# Patient Record
Sex: FEMALE | Race: BLACK OR AFRICAN AMERICAN | NOT HISPANIC OR LATINO | Employment: OTHER | URBAN - METROPOLITAN AREA
[De-identification: names, ages, dates, MRNs, and addresses within clinical notes are randomized per-mention and may not be internally consistent; named-entity substitution may affect disease eponyms.]

---

## 2017-01-02 DIAGNOSIS — H60.90 OTITIS EXTERNA, UNSPECIFIED CHRONICITY, UNSPECIFIED LATERALITY, UNSPECIFIED TYPE: ICD-10-CM

## 2017-01-02 RX ORDER — CIPROFLOXACIN HYDROCHLORIDE 3 MG/ML
SOLUTION/ DROPS OPHTHALMIC
Qty: 5 ML | Refills: 0 | Status: SHIPPED | OUTPATIENT
Start: 2017-01-02 | End: 2017-03-31 | Stop reason: ALTCHOICE

## 2017-01-07 ENCOUNTER — PATIENT MESSAGE (OUTPATIENT)
Dept: FAMILY MEDICINE | Facility: CLINIC | Age: 72
End: 2017-01-07

## 2017-01-07 DIAGNOSIS — J30.9 ALLERGIC RHINITIS WITH POSTNASAL DRIP: Primary | ICD-10-CM

## 2017-01-07 DIAGNOSIS — R09.82 ALLERGIC RHINITIS WITH POSTNASAL DRIP: Primary | ICD-10-CM

## 2017-01-09 ENCOUNTER — PATIENT MESSAGE (OUTPATIENT)
Dept: CARDIOLOGY | Facility: CLINIC | Age: 72
End: 2017-01-09

## 2017-01-09 ENCOUNTER — TELEPHONE (OUTPATIENT)
Dept: GASTROENTEROLOGY | Facility: CLINIC | Age: 72
End: 2017-01-09

## 2017-01-09 DIAGNOSIS — E78.5 HYPERLIPIDEMIA, UNSPECIFIED HYPERLIPIDEMIA TYPE: ICD-10-CM

## 2017-01-09 RX ORDER — IPRATROPIUM BROMIDE 21 UG/1
2 SPRAY, METERED NASAL 2 TIMES DAILY PRN
Qty: 30 ML | Refills: 0 | Status: ON HOLD | OUTPATIENT
Start: 2017-01-09 | End: 2018-03-28 | Stop reason: HOSPADM

## 2017-01-09 RX ORDER — ROSUVASTATIN CALCIUM 40 MG/1
40 TABLET, COATED ORAL NIGHTLY
Qty: 90 TABLET | Refills: 3 | Status: SHIPPED | OUTPATIENT
Start: 2017-01-09 | End: 2017-01-12

## 2017-01-09 NOTE — TELEPHONE ENCOUNTER
Reports that she is doing very well.  No weight loss.  Metamucil added and diet changes and is having no further complaints.    She would like to cancel upcoming fu appt.    She will call back if she begins to have complaints or would like to follow up.    Please cancel her appt with me

## 2017-01-09 NOTE — TELEPHONE ENCOUNTER
----- Message from Vivi Box MA sent at 1/9/2017  2:57 PM CST -----  Contact: self, [phone]  Pt wants to speak to you.  ----- Message -----     From: Nieves Duncan     Sent: 1/9/2017   2:07 PM       To: Noel TERRAZAS Staff    Patient called in requesting to speak with you regarding her 1/18 appt.  Please advise.

## 2017-01-12 ENCOUNTER — TELEPHONE (OUTPATIENT)
Dept: CARDIOLOGY | Facility: CLINIC | Age: 72
End: 2017-01-12

## 2017-01-12 RX ORDER — ATORVASTATIN CALCIUM 40 MG/1
40 TABLET, FILM COATED ORAL DAILY
Qty: 90 TABLET | Refills: 3 | Status: SHIPPED | OUTPATIENT
Start: 2017-01-12 | End: 2017-02-21 | Stop reason: ALTCHOICE

## 2017-01-12 RX ORDER — ATORVASTATIN CALCIUM 40 MG/1
40 TABLET, FILM COATED ORAL DAILY
Qty: 90 TABLET | Refills: 3 | Status: SHIPPED | OUTPATIENT
Start: 2017-01-12 | End: 2017-01-12 | Stop reason: SDUPTHER

## 2017-01-12 NOTE — TELEPHONE ENCOUNTER
----- Message from Kirsten Jovel MA sent at 1/12/2017 11:15 AM CST -----  Contact: patient called   Joseph is not going to pay for crestor , can you put her a generic med.  ----- Message -----     From: Kelli Carpenter MA     Sent: 1/12/2017   9:17 AM       To: Garo Lizarraga Staff    Please call the patient at home she need to talk to you about her labs.

## 2017-01-12 NOTE — TELEPHONE ENCOUNTER
Spoke with Dr. Garo Curtis's MA - says that med was handled already - Left a VM for the pt to disregard my previous message.

## 2017-01-12 NOTE — TELEPHONE ENCOUNTER
----- Message from Kelli Carpenter MA sent at 1/12/2017  9:21 AM CST -----  Contact: patient called  Please call the patient at home she need to talk to you about her Crestor and her E-Mail. Last visit was on 10- . Thank you.

## 2017-01-13 ENCOUNTER — PATIENT MESSAGE (OUTPATIENT)
Dept: FAMILY MEDICINE | Facility: CLINIC | Age: 72
End: 2017-01-13

## 2017-01-13 ENCOUNTER — PATIENT MESSAGE (OUTPATIENT)
Dept: CARDIOLOGY | Facility: CLINIC | Age: 72
End: 2017-01-13

## 2017-01-13 DIAGNOSIS — N95.2 ATROPHIC VAGINITIS: Primary | ICD-10-CM

## 2017-01-14 ENCOUNTER — PATIENT MESSAGE (OUTPATIENT)
Dept: FAMILY MEDICINE | Facility: CLINIC | Age: 72
End: 2017-01-14

## 2017-01-16 ENCOUNTER — PATIENT MESSAGE (OUTPATIENT)
Dept: FAMILY MEDICINE | Facility: CLINIC | Age: 72
End: 2017-01-16

## 2017-01-20 ENCOUNTER — TELEPHONE (OUTPATIENT)
Dept: OPHTHALMOLOGY | Facility: CLINIC | Age: 72
End: 2017-01-20

## 2017-01-20 NOTE — TELEPHONE ENCOUNTER
Vague statement of seeing a aberration in the morning out of both eyes for a for a month or more out of her glass door. Apt booked.

## 2017-01-20 NOTE — TELEPHONE ENCOUNTER
----- Message from So Posadas sent at 1/20/2017 11:53 AM CST -----  Contact: Ewelina Arnold   Pt would like to speak with  nurse has some questions a problem she have with both eyes pt can be reached at [phone] please thanks.

## 2017-01-23 ENCOUNTER — PATIENT MESSAGE (OUTPATIENT)
Dept: FAMILY MEDICINE | Facility: CLINIC | Age: 72
End: 2017-01-23

## 2017-01-23 ENCOUNTER — OFFICE VISIT (OUTPATIENT)
Dept: FAMILY MEDICINE | Facility: CLINIC | Age: 72
End: 2017-01-23
Payer: MEDICARE

## 2017-01-23 VITALS
HEART RATE: 56 BPM | WEIGHT: 131.81 LBS | HEIGHT: 65 IN | TEMPERATURE: 99 F | DIASTOLIC BLOOD PRESSURE: 60 MMHG | BODY MASS INDEX: 21.96 KG/M2 | SYSTOLIC BLOOD PRESSURE: 132 MMHG

## 2017-01-23 DIAGNOSIS — N18.4 CKD (CHRONIC KIDNEY DISEASE), STAGE IV: ICD-10-CM

## 2017-01-23 DIAGNOSIS — E46 PROTEIN-CALORIE MALNUTRITION: ICD-10-CM

## 2017-01-23 DIAGNOSIS — R79.9 ABNORMAL FINDING OF BLOOD CHEMISTRY: ICD-10-CM

## 2017-01-23 DIAGNOSIS — I70.0 AORTIC ATHEROSCLEROSIS: ICD-10-CM

## 2017-01-23 DIAGNOSIS — N18.30 CKD (CHRONIC KIDNEY DISEASE) STAGE 3, GFR 30-59 ML/MIN: Chronic | ICD-10-CM

## 2017-01-23 DIAGNOSIS — R63.0 LOSS OF APPETITE: ICD-10-CM

## 2017-01-23 DIAGNOSIS — N89.8 DISCHARGE FROM THE VAGINA: ICD-10-CM

## 2017-01-23 DIAGNOSIS — R63.4 WEIGHT LOSS: Primary | ICD-10-CM

## 2017-01-23 LAB
CANDIDA RRNA VAG QL PROBE: NEGATIVE
G VAGINALIS RRNA GENITAL QL PROBE: NEGATIVE
T VAGINALIS RRNA GENITAL QL PROBE: NEGATIVE

## 2017-01-23 PROCEDURE — 1159F MED LIST DOCD IN RCRD: CPT | Mod: S$GLB,,, | Performed by: FAMILY MEDICINE

## 2017-01-23 PROCEDURE — 1160F RVW MEDS BY RX/DR IN RCRD: CPT | Mod: S$GLB,,, | Performed by: FAMILY MEDICINE

## 2017-01-23 PROCEDURE — 1125F AMNT PAIN NOTED PAIN PRSNT: CPT | Mod: S$GLB,,, | Performed by: FAMILY MEDICINE

## 2017-01-23 PROCEDURE — 87480 CANDIDA DNA DIR PROBE: CPT

## 2017-01-23 PROCEDURE — 99499 UNLISTED E&M SERVICE: CPT | Mod: S$GLB,,, | Performed by: FAMILY MEDICINE

## 2017-01-23 PROCEDURE — 99999 PR PBB SHADOW E&M-EST. PATIENT-LVL III: CPT | Mod: PBBFAC,,, | Performed by: FAMILY MEDICINE

## 2017-01-23 PROCEDURE — 3075F SYST BP GE 130 - 139MM HG: CPT | Mod: S$GLB,,, | Performed by: FAMILY MEDICINE

## 2017-01-23 PROCEDURE — 3078F DIAST BP <80 MM HG: CPT | Mod: S$GLB,,, | Performed by: FAMILY MEDICINE

## 2017-01-23 PROCEDURE — 87210 SMEAR WET MOUNT SALINE/INK: CPT | Mod: QW,S$GLB,, | Performed by: FAMILY MEDICINE

## 2017-01-23 PROCEDURE — 1157F ADVNC CARE PLAN IN RCRD: CPT | Mod: S$GLB,,, | Performed by: FAMILY MEDICINE

## 2017-01-23 PROCEDURE — 99214 OFFICE O/P EST MOD 30 MIN: CPT | Mod: S$GLB,,, | Performed by: FAMILY MEDICINE

## 2017-01-23 RX ORDER — FLUCONAZOLE 150 MG/1
150 TABLET ORAL DAILY
Qty: 1 TABLET | Refills: 0 | Status: SHIPPED | OUTPATIENT
Start: 2017-01-23 | End: 2017-01-24

## 2017-01-23 NOTE — MR AVS SNAPSHOT
[facility]  [address] [facility], [address]  [facility] [address]  Phone: [phone]  Fax: [phone]                  Ewelina Arnold   2017 9:40 AM   Office Visit    Description:  Female : 1945   Provider:  Kalie Walton MD   Department:  [facility]           Reason for Visit     Otalgia     diabetic testing     vaginal dryness     Weight Loss           Diagnoses this Visit        Comments    Weight loss    -  Primary     Loss of appetite         Protein-calorie malnutrition         Discharge from the vagina         Abnormal finding of blood chemistry                To Do List           Future Appointments        Provider Department Dept Phone    2017 9:30 AM [facility] [phone]    2017 9:45 AM SPECIMEN, [place] [facility] [phone]    2017 1:00 PM PERIMETRY, [place] [facility] [phone]    2017 1:30 PM Marky Mccray MD [facility] [phone]    2017 9:30 AM [facility] [phone]      Goals (5 Years of Data)     None       These Medications        Disp Refills Start End    fluconazole (DIFLUCAN) 150 MG Tab 1 tablet 0 2017    Take 1 tablet (150 mg total) by mouth once daily. - Oral    Pharmacy: [facility] [address] [place] [address] GAURANG TORRES AT [facility] Ph #: [phone]         [facility]     [facility] Nurse Care [facility] -  Assistance  Registered nurses in the [facility] provide clinical advisement, health education, appointment booking, and other advisory services.  Call for this free service at [phone].             Medications           Message regarding Medications     Verify the changes and/or additions to your medication regime listed below are the same as discussed with your clinician today.   If any of these changes or additions are incorrect, please notify your healthcare provider.        START taking these NEW medications        Refills    fluconazole (DIFLUCAN) 150 MG Tab 0    Sig: Take 1 tablet (150 mg total) by mouth once daily.    Class: Normal    Route: Oral      STOP taking these medications     estrogens, conjugated, (PREMARIN) 0.3 MG tablet Take 1 tablet (0.3 mg total) by mouth once daily.           Verify that the below list of medications is an accurate representation of the medications you are currently taking.  If none reported, the list may be blank. If incorrect, please contact your healthcare provider. Carry this list with you in case of emergency.           Current Medications     amlodipine (NORVASC) 10 MG tablet TAKE 1 TABLET EVERY DAY    ammonium lactate 12 % Crea Apply 1 application topically 2 (two) times daily as needed.    aspirin (ECOTRIN) 81 MG EC tablet Take 1 tablet (81 mg total) by mouth once daily.    atorvastatin (LIPITOR) 40 MG tablet Take 1 tablet (40 mg total) by mouth once daily.    butalbital-aspirin-caffeine -40 mg (FIORINAL) -40 mg Cap Take 1 capsule by mouth.    carvedilol (COREG) 12.5 MG tablet TAKE 1 TABLET TWICE DAILY WITH MEALS    cholestyramine-aspartame (CHOLESTYRAMINE LIGHT) 4 gram PwPk Take by mouth.    ciprofloxacin HCl (CILOXAN) 0.3 % ophthalmic solution INSTILL 2 DROPS IN EACH EAR THREE TIMES DAILY.    citalopram (CELEXA) 10 MG tablet TAKE 1 TABLET EVERY DAY    ferrous gluconate (FERGON) 325 MG Tab Take 1 tablet (325 mg total) by mouth daily with breakfast.    furosemide (LASIX) 20 MG tablet TAKE 1 TABLET TWICE DAILY    gentamicin-prednisoLONE (PRED G) 0.3-1 % ophthalmic drops 2 drops to the affected ear 3 times a day for 5 days.  Please let patient know that ophthalmic drops can be used in the ear.    hydrALAZINE (APRESOLINE) 10 MG tablet TAKE 2 TABLETS EVERY 12 HOURS    hydroxychloroquine (PLAQUENIL) 200 mg tablet TAKE 1 TABLET TWICE DAILY  "   hyoscyamine (LEVSIN/SL) 0.125 mg Subl Place 1 tablet (0.125 mg total) under the tongue 2 (two) times daily as needed.    ipratropium (ATROVENT) 0.03 % nasal spray 2 sprays by Nasal route 2 (two) times daily as needed for Rhinitis.    latanoprost 0.005 % ophthalmic solution INSTILL 1 DROP INTO BOTH EYES EVERY DAY    levothyroxine (SYNTHROID) 50 MCG tablet TAKE 1 TABLET BEFORE BREAKFAST DAILY    meclizine (ANTIVERT) 12.5 mg tablet Take 1 tablet (12.5 mg total) by mouth 3 (three) times daily as needed.    multivitamin capsule Take 1 capsule by mouth once daily.    nitroGLYCERIN (NITROSTAT) 0.4 MG SL tablet PLACE 1 TABLET UNDER THE TONGUE EVERY 5 MINUTES FOR 3 DOSES --IF NOT RELIEVED GO TO THE ER    nystatin-triamcinolone (MYCOLOG II) cream Apply topically 4 (four) times daily.    TENS units Sravanthi 1 application by Misc.(Non-Drug; Combo Route) route daily as needed (pain).    triamcinolone acetonide 0.1% (KENALOG) 0.1 % paste PLACE ONTO TEETH TWICE DAILY    betamethasone dipropionate (DIPROLENE) 0.05 % cream Apply topically 2 (two) times daily as needed.    fexofenadine (ALLEGRA) 180 MG tablet Take 1 tablet (180 mg total) by mouth once daily.    fluconazole (DIFLUCAN) 150 MG Tab Take 1 tablet (150 mg total) by mouth once daily.    mometasone (ELOCON) 0.1 % ointment Apply topically once daily.    omeprazole (PRILOSEC) 20 MG capsule Take 20 mg by mouth as needed.            Clinical Reference Information           Vital Signs - Last Recorded  Most recent update: 1/23/2017  9:38 AM by Reyna Forman MA    BP Pulse Temp Ht Wt BMI    132/60 (BP Location: Left arm) (!) 56 98.5 °F (36.9 °C) 5' 5" (1.651 m) 59.8 kg (131 lb 13.4 oz) 21.94 kg/m2      Blood Pressure          Most Recent Value    BP  132/60      Allergies as of 1/23/2017     Ace Inhibitors    Arthrotec 50  [Diclofenac-misoprostol]    Bactrim [Sulfamethoxazole-trimethoprim]    Bentyl [Dicyclomine]    Doxycycline    Imdur [Isosorbide Mononitrate]    Lomotil " [Diphenoxylate-atropine]    Tramadol    Vioxx [Rofecoxib]    Lozol [Indapamide]      Immunizations Administered on Date of Encounter - 1/23/2017     None      Orders Placed During Today's Visit      Normal Orders This Visit    VAGINOSIS SCREEN BY DNA PROBE     Future Labs/Procedures Expected by Expires    CBC auto differential  1/23/2017 3/24/2018    Comprehensive metabolic panel  1/23/2017 3/24/2018    Hemoglobin A1c  1/23/2017 3/24/2018    Lipid panel  1/23/2017 3/24/2018    TSH  1/23/2017 3/24/2018

## 2017-01-24 ENCOUNTER — LAB VISIT (OUTPATIENT)
Dept: LAB | Facility: HOSPITAL | Age: 72
End: 2017-01-24
Attending: FAMILY MEDICINE
Payer: MEDICARE

## 2017-01-24 ENCOUNTER — PATIENT MESSAGE (OUTPATIENT)
Dept: FAMILY MEDICINE | Facility: CLINIC | Age: 72
End: 2017-01-24

## 2017-01-24 DIAGNOSIS — R79.9 ABNORMAL FINDING OF BLOOD CHEMISTRY: ICD-10-CM

## 2017-01-24 DIAGNOSIS — R63.4 WEIGHT LOSS: ICD-10-CM

## 2017-01-24 DIAGNOSIS — R63.0 LOSS OF APPETITE: ICD-10-CM

## 2017-01-24 DIAGNOSIS — E46 PROTEIN-CALORIE MALNUTRITION: ICD-10-CM

## 2017-01-24 LAB
ALBUMIN SERPL BCP-MCNC: 3.4 G/DL
ALP SERPL-CCNC: 60 U/L
ALT SERPL W/O P-5'-P-CCNC: 20 U/L
ANION GAP SERPL CALC-SCNC: 6 MMOL/L
AST SERPL-CCNC: 36 U/L
BASOPHILS # BLD AUTO: 0.02 K/UL
BASOPHILS NFR BLD: 0.6 %
BILIRUB SERPL-MCNC: 0.3 MG/DL
BUN SERPL-MCNC: 31 MG/DL
CALCIUM SERPL-MCNC: 9.2 MG/DL
CHLORIDE SERPL-SCNC: 111 MMOL/L
CHOLEST/HDLC SERPL: 2.5 {RATIO}
CO2 SERPL-SCNC: 23 MMOL/L
CREAT SERPL-MCNC: 1.7 MG/DL
DIFFERENTIAL METHOD: ABNORMAL
EOSINOPHIL # BLD AUTO: 0.1 K/UL
EOSINOPHIL NFR BLD: 4.2 %
ERYTHROCYTE [DISTWIDTH] IN BLOOD BY AUTOMATED COUNT: 13.4 %
EST. GFR  (AFRICAN AMERICAN): 34.5 ML/MIN/1.73 M^2
EST. GFR  (NON AFRICAN AMERICAN): 29.9 ML/MIN/1.73 M^2
GLUCOSE SERPL-MCNC: 67 MG/DL
HCT VFR BLD AUTO: 34.1 %
HDL/CHOLESTEROL RATIO: 39.4 %
HDLC SERPL-MCNC: 137 MG/DL
HDLC SERPL-MCNC: 54 MG/DL
HGB BLD-MCNC: 10.9 G/DL
LDLC SERPL CALC-MCNC: 71.6 MG/DL
LYMPHOCYTES # BLD AUTO: 1.4 K/UL
LYMPHOCYTES NFR BLD: 41.8 %
MCH RBC QN AUTO: 26.3 PG
MCHC RBC AUTO-ENTMCNC: 32 %
MCV RBC AUTO: 82 FL
MONOCYTES # BLD AUTO: 0.5 K/UL
MONOCYTES NFR BLD: 14.9 %
NEUTROPHILS # BLD AUTO: 1.3 K/UL
NEUTROPHILS NFR BLD: 38.5 %
NONHDLC SERPL-MCNC: 83 MG/DL
PLATELET # BLD AUTO: 192 K/UL
PMV BLD AUTO: 10.6 FL
POTASSIUM SERPL-SCNC: 4.6 MMOL/L
PROT SERPL-MCNC: 7.4 G/DL
RBC # BLD AUTO: 4.15 M/UL
SODIUM SERPL-SCNC: 140 MMOL/L
TRIGL SERPL-MCNC: 57 MG/DL
TSH SERPL DL<=0.005 MIU/L-ACNC: 1.19 UIU/ML
WBC # BLD AUTO: 3.35 K/UL

## 2017-01-24 PROCEDURE — 85025 COMPLETE CBC W/AUTO DIFF WBC: CPT

## 2017-01-24 PROCEDURE — 84443 ASSAY THYROID STIM HORMONE: CPT

## 2017-01-24 PROCEDURE — 80053 COMPREHEN METABOLIC PANEL: CPT

## 2017-01-24 PROCEDURE — 83036 HEMOGLOBIN GLYCOSYLATED A1C: CPT

## 2017-01-24 PROCEDURE — 80061 LIPID PANEL: CPT

## 2017-01-24 PROCEDURE — 36415 COLL VENOUS BLD VENIPUNCTURE: CPT | Mod: PO

## 2017-01-24 NOTE — PROGRESS NOTES
Subjective:       Patient ID: Ewelina Arnold is a 71 y.o. female.    Chief Complaint: Otalgia (bilateral); diabetic testing; vaginal dryness; and Weight Loss (unexplained)   patient complaining of a mild vaginal discharge, bilateral ear pain, and weight loss.  Patient's recent colonoscopy was well within normal limits. Pt wants to be tested for diabetes  HPI see above  Review of Systems   Constitutional: Negative.    HENT: Positive for ear pain, postnasal drip, rhinorrhea and sinus pressure.    Eyes: Negative.    Respiratory: Negative.    Cardiovascular: Negative.    Gastrointestinal: Negative.    Endocrine: Negative.    Genitourinary: Positive for vaginal discharge.   Musculoskeletal: Negative.    Skin: Negative.    Allergic/Immunologic: Negative.    Neurological: Negative.    Hematological: Negative.    Psychiatric/Behavioral: Negative.        Objective:      Physical Exam   Constitutional: She is oriented to person, place, and time. She appears well-developed and well-nourished. No distress.   HENT:   Head: Normocephalic and atraumatic.   Right Ear: Hearing and external ear normal. A foreign body is present.   Left Ear: Hearing and external ear normal. A foreign body is present.   Nose: Nose normal. Right sinus exhibits no maxillary sinus tenderness and no frontal sinus tenderness. Left sinus exhibits no maxillary sinus tenderness and no frontal sinus tenderness.   Mouth/Throat: Uvula is midline and oropharynx is clear and moist.   Eyes: Conjunctivae and EOM are normal. Pupils are equal, round, and reactive to light. Right eye exhibits no discharge. Left eye exhibits no discharge.   Cardiovascular: Normal rate, regular rhythm, normal heart sounds and intact distal pulses.    Pulmonary/Chest: Effort normal and breath sounds normal. No respiratory distress. She has no wheezes. She has no rales. She exhibits no tenderness.   Abdominal: Soft. Bowel sounds are normal. She exhibits no distension. There is no tenderness.  Hernia confirmed negative in the right inguinal area and confirmed negative in the left inguinal area.   Genitourinary: Vagina normal. Pelvic exam was performed with patient supine. No labial fusion. There is no rash, tenderness, lesion or injury on the right labia. There is no rash, tenderness, lesion or injury on the left labia. Cervix exhibits discharge. Cervix exhibits no motion tenderness and no friability. No erythema, tenderness or bleeding in the vagina. No foreign body in the vagina. No signs of injury around the vagina.   Lymphadenopathy:        Right: No inguinal adenopathy present.        Left: No inguinal adenopathy present.   Neurological: She is alert and oriented to person, place, and time.   Skin: Skin is warm and dry. No rash noted. She is not diaphoretic. No erythema. No pallor.   Psychiatric: She has a normal mood and affect. Her behavior is normal. Judgment and thought content normal.   Nursing note and vitals reviewed.      Assessment:       1. Weight loss    2. Loss of appetite    3. Protein-calorie malnutrition     4. Discharge from the vagina    5. Abnormal finding of blood chemistry       6.    Bilateral cerumen impactions  Plan:     med card 1-23-17    vaginosis screen performed and pending  We'll obtain hemoglobin A1c, CBC, TSH, comprehensive metabolic profile, lipid profile.  We'll contact patient with results when available.  Recommend patient increase number of calories consumed.    Bilateral ear wash was performed with good results leaving both ear canals clear, both TMs clear.  Patient tolerated the procedure with no difficulty.

## 2017-01-25 LAB
ESTIMATED AVG GLUCOSE: 117 MG/DL
HBA1C MFR BLD HPLC: 5.7 %

## 2017-01-27 ENCOUNTER — TELEPHONE (OUTPATIENT)
Dept: FAMILY MEDICINE | Facility: CLINIC | Age: 72
End: 2017-01-27

## 2017-01-27 ENCOUNTER — PATIENT MESSAGE (OUTPATIENT)
Dept: FAMILY MEDICINE | Facility: CLINIC | Age: 72
End: 2017-01-27

## 2017-01-27 NOTE — TELEPHONE ENCOUNTER
----- Message from Kiah Crowe sent at 1/27/2017 12:46 PM CST -----  Contact: [phone]  Patient would like to get test results.  Name of test (lab, mammo, etc.):  Labs and vaginal exam ressults  Date of test:  1/24  Ordering provider: Dr. Ruth  Where was the test performed:  [facility]  Comments:

## 2017-01-27 NOTE — TELEPHONE ENCOUNTER
Spoke with patient advised her of released results per Dr. Ruth for labs completed on 1/24/17 and vaginosis completed on 1/23/17.  Patient also advised that she can view results online per [facility].  Patient verbalizes understanding.

## 2017-01-30 ENCOUNTER — PATIENT MESSAGE (OUTPATIENT)
Dept: FAMILY MEDICINE | Facility: CLINIC | Age: 72
End: 2017-01-30

## 2017-01-31 ENCOUNTER — TELEPHONE (OUTPATIENT)
Dept: FAMILY MEDICINE | Facility: CLINIC | Age: 72
End: 2017-01-31

## 2017-01-31 ENCOUNTER — PATIENT MESSAGE (OUTPATIENT)
Dept: FAMILY MEDICINE | Facility: CLINIC | Age: 72
End: 2017-01-31

## 2017-01-31 ENCOUNTER — TELEPHONE (OUTPATIENT)
Dept: PHARMACY | Facility: CLINIC | Age: 72
End: 2017-01-31

## 2017-01-31 DIAGNOSIS — E55.9 VITAMIN D DEFICIENCY DISEASE: Primary | ICD-10-CM

## 2017-01-31 RX ORDER — ERGOCALCIFEROL 1.25 MG/1
50000 CAPSULE ORAL
Qty: 3 CAPSULE | Refills: 0 | OUTPATIENT
Start: 2017-01-31

## 2017-01-31 NOTE — TELEPHONE ENCOUNTER
----- Message from Tran Wilkins sent at 1/31/2017  8:15 AM CST -----  Contact: call  [phone]  Patient is calling in regards to getting a  vitimain d level done, pt states that she has a lab appt. Scheduled for tomorrow 02/01/17 in [facility] and would like to have added to that lab order so she does not have to be stuck twice. Patient states that she has been in contact with dr castle over the computer

## 2017-01-31 NOTE — TELEPHONE ENCOUNTER
Calcitriol order linked to lab appt 2/1/17.  Patient advised per [facility] refer to email encounter dated 1/30/17.

## 2017-02-01 ENCOUNTER — CLINICAL SUPPORT (OUTPATIENT)
Dept: OPHTHALMOLOGY | Facility: CLINIC | Age: 72
End: 2017-02-01
Payer: MEDICARE

## 2017-02-01 ENCOUNTER — OFFICE VISIT (OUTPATIENT)
Dept: OPHTHALMOLOGY | Facility: CLINIC | Age: 72
End: 2017-02-01
Payer: MEDICARE

## 2017-02-01 ENCOUNTER — LAB VISIT (OUTPATIENT)
Dept: LAB | Facility: HOSPITAL | Age: 72
End: 2017-02-01
Attending: INTERNAL MEDICINE
Payer: MEDICARE

## 2017-02-01 DIAGNOSIS — H40.243 RESIDUAL STAGE OF ANGLE-CLOSURE GLAUCOMA OF BOTH EYES: Primary | ICD-10-CM

## 2017-02-01 DIAGNOSIS — Z79.899 HIGH RISK MEDICATION USE: ICD-10-CM

## 2017-02-01 DIAGNOSIS — R74.01 ELEVATED AST (SGOT): ICD-10-CM

## 2017-02-01 DIAGNOSIS — M32.9 SYSTEMIC LUPUS ERYTHEMATOSUS, UNSPECIFIED SLE TYPE, UNSPECIFIED ORGAN INVOLVEMENT STATUS: ICD-10-CM

## 2017-02-01 DIAGNOSIS — M32.9 LUPUS (SYSTEMIC LUPUS ERYTHEMATOSUS): Chronic | ICD-10-CM

## 2017-02-01 DIAGNOSIS — H25.13 NUCLEAR SCLEROSIS, BILATERAL: ICD-10-CM

## 2017-02-01 DIAGNOSIS — E55.9 VITAMIN D DEFICIENCY DISEASE: ICD-10-CM

## 2017-02-01 DIAGNOSIS — H04.123 INSUFFICIENCY OF TEAR FILM OF BOTH EYES: ICD-10-CM

## 2017-02-01 DIAGNOSIS — I13.0 BENIGN HYPERTENSIVE HEART AND CKD, STAGE 3 (GFR 30-59), W CHF: ICD-10-CM

## 2017-02-01 DIAGNOSIS — N18.30 BENIGN HYPERTENSIVE HEART AND CKD, STAGE 3 (GFR 30-59), W CHF: ICD-10-CM

## 2017-02-01 DIAGNOSIS — H40.243 RESIDUAL STAGE OF ANGLE-CLOSURE GLAUCOMA OF BOTH EYES: ICD-10-CM

## 2017-02-01 LAB
ALBUMIN SERPL BCP-MCNC: 3.2 G/DL
ALBUMIN SERPL BCP-MCNC: 3.2 G/DL
ALP SERPL-CCNC: 58 U/L
ALT SERPL W/O P-5'-P-CCNC: 23 U/L
ANION GAP SERPL CALC-SCNC: 7 MMOL/L
AST SERPL-CCNC: 37 U/L
BILIRUB DIRECT SERPL-MCNC: 0.1 MG/DL
BILIRUB SERPL-MCNC: 0.2 MG/DL
BUN SERPL-MCNC: 38 MG/DL
CALCIUM SERPL-MCNC: 8.8 MG/DL
CHLORIDE SERPL-SCNC: 112 MMOL/L
CO2 SERPL-SCNC: 20 MMOL/L
CREAT SERPL-MCNC: 1.5 MG/DL
EST. GFR  (AFRICAN AMERICAN): 40.1 ML/MIN/1.73 M^2
EST. GFR  (NON AFRICAN AMERICAN): 34.8 ML/MIN/1.73 M^2
GLUCOSE SERPL-MCNC: 86 MG/DL
PHOSPHATE SERPL-MCNC: 3.5 MG/DL
POTASSIUM SERPL-SCNC: 4.2 MMOL/L
PROT SERPL-MCNC: 6.9 G/DL
SODIUM SERPL-SCNC: 139 MMOL/L

## 2017-02-01 PROCEDURE — 99499 UNLISTED E&M SERVICE: CPT | Mod: S$GLB,,, | Performed by: OPHTHALMOLOGY

## 2017-02-01 PROCEDURE — 92133 CPTRZD OPH DX IMG PST SGM ON: CPT | Mod: S$GLB,,, | Performed by: OPHTHALMOLOGY

## 2017-02-01 PROCEDURE — 99999 PR PBB SHADOW E&M-EST. PATIENT-LVL I: CPT | Mod: PBBFAC,,, | Performed by: OPHTHALMOLOGY

## 2017-02-01 PROCEDURE — 92014 COMPRE OPH EXAM EST PT 1/>: CPT | Mod: S$GLB,,, | Performed by: OPHTHALMOLOGY

## 2017-02-01 PROCEDURE — 92083 EXTENDED VISUAL FIELD XM: CPT | Mod: S$GLB,,, | Performed by: OPHTHALMOLOGY

## 2017-02-01 NOTE — MR AVS SNAPSHOT
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    -  Primary     Nuclear sclerosis, bilateral         Insufficiency of tear film of both eyes         Systemic lupus erythematosus, unspecified SLE type, unspecified organ involvement status         High risk medication use         Lupus (systemic lupus erythematosus)                To Do List           Future Appointments        Provider Department Dept Phone    2017 10:00 AM David Collazo MD [facility] [phone]    3/23/2017 8:15 AM [facility] [phone]    3/31/2017 10:00 AM Kalie Walton MD [facility] [phone]    2017 10:15 AM LAB[facility] [phone]    2017 10:30 AM SPECIMEN, [place] [facility] [phone]      Goals (5 Years of Data)     None      Follow-Up and Disposition     Return in about 6 months (around 2017), or if symptoms worsen or fail to improve, for Pressure and HVF & OCT RNFL.      [facility]     [facility] Care Line - 24/ Assistance  Registered nurses in the [facility] provide clinical advisement, health education, appointment booking, and other advisory services.  Call for this free service at [phone].             Medications           Message regarding Medications     Verify the changes and/or additions to your medication regime listed below are the same as discussed with your clinician today.  If any of these changes or additions are incorrect, please notify  your healthcare provider.             Verify that the below list of medications is an accurate representation of the medications you are currently taking.  If none reported, the list may be blank. If incorrect, please contact your healthcare provider. Carry this list with you in case of emergency.           Current Medications     amlodipine (NORVASC) 10 MG tablet TAKE 1 TABLET EVERY DAY    ammonium lactate 12 % Crea Apply 1 application topically 2 (two) times daily as needed.    aspirin (ECOTRIN) 81 MG EC tablet Take 1 tablet (81 mg total) by mouth once daily.    atorvastatin (LIPITOR) 40 MG tablet Take 1 tablet (40 mg total) by mouth once daily.    betamethasone dipropionate (DIPROLENE) 0.05 % cream Apply topically 2 (two) times daily as needed.    butalbital-aspirin-caffeine -40 mg (FIORINAL) -40 mg Cap Take 1 capsule by mouth.    carvedilol (COREG) 12.5 MG tablet TAKE 1 TABLET TWICE DAILY WITH MEALS    cholestyramine-aspartame (CHOLESTYRAMINE LIGHT) 4 gram PwPk Take by mouth.    ciprofloxacin HCl (CILOXAN) 0.3 % ophthalmic solution INSTILL 2 DROPS IN EACH EAR THREE TIMES DAILY.    citalopram (CELEXA) 10 MG tablet TAKE 1 TABLET EVERY DAY    ferrous gluconate (FERGON) 325 MG Tab Take 1 tablet (325 mg total) by mouth daily with breakfast.    fexofenadine (ALLEGRA) 180 MG tablet Take 1 tablet (180 mg total) by mouth once daily.    furosemide (LASIX) 20 MG tablet TAKE 1 TABLET TWICE DAILY    gentamicin-prednisoLONE (PRED G) 0.3-1 % ophthalmic drops 2 drops to the affected ear 3 times a day for 5 days.  Please let patient know that ophthalmic drops can be used in the ear.    hydrALAZINE (APRESOLINE) 10 MG tablet TAKE 2 TABLETS EVERY 12 HOURS    hydroxychloroquine (PLAQUENIL) 200 mg tablet TAKE 1 TABLET TWICE DAILY    hyoscyamine (LEVSIN/SL) 0.125 mg Subl Place 1 tablet (0.125 mg total) under the tongue 2 (two) times daily as needed.    ipratropium (ATROVENT) 0.03 % nasal spray 2 sprays by Nasal route 2  (two) times daily as needed for Rhinitis.    latanoprost 0.005 % ophthalmic solution INSTILL 1 DROP INTO BOTH EYES EVERY DAY    levothyroxine (SYNTHROID) 50 MCG tablet TAKE 1 TABLET BEFORE BREAKFAST DAILY    meclizine (ANTIVERT) 12.5 mg tablet Take 1 tablet (12.5 mg total) by mouth 3 (three) times daily as needed.    mometasone (ELOCON) 0.1 % ointment Apply topically once daily.    multivitamin capsule Take 1 capsule by mouth once daily.    nitroGLYCERIN (NITROSTAT) 0.4 MG SL tablet PLACE 1 TABLET UNDER THE TONGUE EVERY 5 MINUTES FOR 3 DOSES --IF NOT RELIEVED GO TO THE ER    nystatin-triamcinolone (MYCOLOG II) cream Apply topically 4 (four) times daily.    omeprazole (PRILOSEC) 20 MG capsule Take 20 mg by mouth as needed.     TENS units Sravanthi 1 application by Misc.(Non-Drug; Combo Route) route daily as needed (pain).    triamcinolone acetonide 0.1% (KENALOG) 0.1 % paste PLACE ONTO TEETH TWICE DAILY           Clinical Reference Information           Allergies as of 2/1/2017     Ace Inhibitors    Arthrotec 50  [Diclofenac-misoprostol]    Bactrim [Sulfamethoxazole-trimethoprim]    Bentyl [Dicyclomine]    Doxycycline    Imdur [Isosorbide Mononitrate]    Lomotil [Diphenoxylate-atropine]    Tramadol    Vioxx [Rofecoxib]    Lozol [Indapamide]      Immunizations Administered on Date of Encounter - 2/1/2017     None      Orders Placed During Today's Visit      Normal Orders This Visit    Posterior Segment OCT Optic Nerve- Both eyes     Recurring Lab Work Interval Expires    Ch[facility]ra Visual Field - OU - Extended - Both Eyes  Every 6 Months until 2/2/2018 2/2/2018    Posterior Segment OCT Optic Nerve- Both eyes  Every 6 Months until 2/2/2018 2/2/2018      Language Assistance Services     ATTENTION: Language assistance services are available, free of charge. Please call [phone].      ATENCIÓN: Si femila abbey, tiene a mera disposición servicios gratuitos de asistencia lingüística. Llame al [phone].     CHÚ Ý: N?u  b?n nói Ti?ng Vi?t, có các d?ch v? h? tr? ngôn ng? mi?n phí dành cho b?n. G?i s? [phone].         [facility] complies with applicable Federal civil rights laws and does not discriminate on the basis of race, color, national origin, age, disability, or sex.

## 2017-02-01 NOTE — PROGRESS NOTES
"HPI     Glaucoma    Additional comments: 4 mon iop chk/hvf rev           Comments   Patient states she has been seeing a "mirage-like appearance" in her   extreme lateral OU VF for the past month. No flashes. No floaters.      Glaucoma   Plaquenil pt   Lupus   Dry Eyes  NSC OU     Latanoprost Q HS OU          Last edited by Doyle Burgess on 2/1/2017  2:04 PM. (History)            Assessment /Plan     For exam results, see Encounter Report.    Residual stage of angle-closure glaucoma of both eyes    Nuclear sclerosis, bilateral    Insufficiency of tear film of both eyes    Systemic lupus erythematosus, unspecified SLE type, unspecified organ involvement status    High risk medication use        Residual Angle Closure  Stable Glaucoma & Patient near target IOP range.  Will continue with same medicines as patient is tolerating well with good adherence    s/p PI OU    CCT  535 // 537    Target mid teens    Both eyes  Xal q HS    SLE on plaquenil > 5years  [facility] chart routine with Q & A  Doing well    SD-OCT Mac good Nl contour and thickness OU  Good COST/OLM integrity      NSC OU  Observe        Plan  RTC 6 months for IOP, HVF 24-2, OCT RNFL  RTC sooner prn with good understanding                                "

## 2017-02-02 ENCOUNTER — TELEPHONE (OUTPATIENT)
Dept: CARDIOLOGY | Facility: CLINIC | Age: 72
End: 2017-02-02

## 2017-02-02 ENCOUNTER — PATIENT MESSAGE (OUTPATIENT)
Dept: FAMILY MEDICINE | Facility: CLINIC | Age: 72
End: 2017-02-02

## 2017-02-02 ENCOUNTER — TELEPHONE (OUTPATIENT)
Dept: FAMILY MEDICINE | Facility: CLINIC | Age: 72
End: 2017-02-02

## 2017-02-02 DIAGNOSIS — E55.9 VITAMIN D DEFICIENCY: Primary | ICD-10-CM

## 2017-02-02 LAB — 1,25(OH)2D3 SERPL-MCNC: 28 PG/ML

## 2017-02-02 RX ORDER — ERGOCALCIFEROL 1.25 MG/1
50000 CAPSULE ORAL
Qty: 3 CAPSULE | Refills: 3 | Status: SHIPPED | OUTPATIENT
Start: 2017-02-02 | End: 2017-05-02 | Stop reason: SDUPTHER

## 2017-02-02 NOTE — TELEPHONE ENCOUNTER
----- Message from Elham Wyman MD sent at 2/2/2017 11:21 AM CST -----  Please let the patient know that her blod work is normal. Continue you cholesterol medication

## 2017-02-13 ENCOUNTER — INITIAL CONSULT (OUTPATIENT)
Dept: OPHTHALMOLOGY | Facility: CLINIC | Age: 72
End: 2017-02-13
Payer: MEDICARE

## 2017-02-13 DIAGNOSIS — H59.099 POSITIVE DYSPHOTOPSIA: ICD-10-CM

## 2017-02-13 DIAGNOSIS — H53.8 POSITIVE DYSPHOTOPSIA: ICD-10-CM

## 2017-02-13 PROCEDURE — 99999 PR PBB SHADOW E&M-EST. PATIENT-LVL II: CPT | Mod: PBBFAC,,, | Performed by: OPHTHALMOLOGY

## 2017-02-13 PROCEDURE — 92014 COMPRE OPH EXAM EST PT 1/>: CPT | Mod: S$GLB,,, | Performed by: OPHTHALMOLOGY

## 2017-02-13 NOTE — PROGRESS NOTES
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 Patient   states she been seeing  mirage in her peripheral vision OU for about 2   months .     (-) headaches   (-) blurry vision  (-) floaters   (-) flashes of light     I have personally interviewed the patient, reviewed the history and   examined the patient and agree with the technician's exam.    She notes the problem when the level of light suddenly increases (stepping   outside into sunlight, turning on the light in her bathroom at night).   This may happen with or without her eyeglasses. The episodes last less   than several minutes. They have not been associated with headache. She   described the effect as seeing shimmering light in her peripheral field to   both sides simultaneously. She has had laser treatment to both eyes for   narrow angle glaucoma.       Last edited by David Collazo MD on 2/13/2017  9:06 AM. (History)        ROS     Positive for: Gastrointestinal, Skin, Genitourinary, Musculoskeletal,   Endocrine, Cardiovascular, Eyes, Respiratory, Heme/Lymph    Negative for: Constitutional, Neurological, HENT, Psychiatric,   Allergic/Imm    Last edited by David Collazo MD on 2/13/2017  8:54 AM. (History)        Assessment /Plan     For exam results, see Encounter Report.    Positive dysphotopsia      Ms. 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  I do not see evidence that his is a neurological process. She will return to Dr. Mccray for continued eye care and to me as requested.

## 2017-02-13 NOTE — MR AVS SNAPSHOT
[facility]  [address]  [facility] [address]  Phone: [phone]  Fax: [phone]                  Ewelina Arnold   2017 9:00 AM   Initial consult    Description:  Female : 1945   Provider:  David Collazo MD   Department:  [facility]           Reason for Visit     Concerns About Ocular Health           Diagnoses this Visit        Comments    Positive dysphotopsia                To Do List           Future Appointments        Provider Department Dept Phone    2017 2:15 PM Liz Torres MD [facility] [address] [phone]    3/23/2017 8:15 AM [facility] [phone]    3/31/2017 10:00 AM Kalie Walton MD [facility] [phone]    2017 10:15 AM LAB[facility] [phone]    2017 10:30 AM SPECIMEN, [place] [facility] [phone]      Goals (5 Years of Data)     None      Follow-Up and Disposition     Return if symptoms worsen or fail to improve.      [facility]     [facility] Nurse Care [facility] -  Assistance  Registered nurses in the [facility] provide clinical advisement, health education, appointment booking, and other advisory services.  Call for this free service at [phone].             Medications           Message regarding Medications     Verify the changes and/or additions to your medication regime listed below are the same as discussed with your clinician today.  If any of these changes or additions are incorrect, please notify your healthcare provider.             Verify that the below list of medications is an accurate representation of the medications you are currently taking.  If none reported, the list may be blank. If incorrect, please contact your healthcare provider. Carry this list with you in case of emergency.           Current Medications     amlodipine (NORVASC)  10 MG tablet TAKE 1 TABLET EVERY DAY    ammonium lactate 12 % Crea Apply 1 application topically 2 (two) times daily as needed.    aspirin (ECOTRIN) 81 MG EC tablet Take 1 tablet (81 mg total) by mouth once daily.    atorvastatin (LIPITOR) 40 MG tablet Take 1 tablet (40 mg total) by mouth once daily.    betamethasone dipropionate (DIPROLENE) 0.05 % cream Apply topically 2 (two) times daily as needed.    butalbital-aspirin-caffeine -40 mg (FIORINAL) -40 mg Cap Take 1 capsule by mouth.    carvedilol (COREG) 12.5 MG tablet TAKE 1 TABLET TWICE DAILY WITH MEALS    cholestyramine-aspartame (CHOLESTYRAMINE LIGHT) 4 gram PwPk Take by mouth.    ciprofloxacin HCl (CILOXAN) 0.3 % ophthalmic solution INSTILL 2 DROPS IN EACH EAR THREE TIMES DAILY.    citalopram (CELEXA) 10 MG tablet TAKE 1 TABLET EVERY DAY    ergocalciferol (ERGOCALCIFEROL) 50,000 unit Cap Take 1 capsule (50,000 Units total) by mouth every 30 days.    ferrous gluconate (FERGON) 325 MG Tab Take 1 tablet (325 mg total) by mouth daily with breakfast.    fexofenadine (ALLEGRA) 180 MG tablet Take 1 tablet (180 mg total) by mouth once daily.    furosemide (LASIX) 20 MG tablet TAKE 1 TABLET TWICE DAILY    gentamicin-prednisoLONE (PRED G) 0.3-1 % ophthalmic drops 2 drops to the affected ear 3 times a day for 5 days.  Please let patient know that ophthalmic drops can be used in the ear.    hydrALAZINE (APRESOLINE) 10 MG tablet TAKE 2 TABLETS EVERY 12 HOURS    hydroxychloroquine (PLAQUENIL) 200 mg tablet TAKE 1 TABLET TWICE DAILY    hyoscyamine (LEVSIN/SL) 0.125 mg Subl Place 1 tablet (0.125 mg total) under the tongue 2 (two) times daily as needed.    ipratropium (ATROVENT) 0.03 % nasal spray 2 sprays by Nasal route 2 (two) times daily as needed for Rhinitis.    latanoprost 0.005 % ophthalmic solution INSTILL 1 DROP INTO BOTH EYES EVERY DAY    levothyroxine (SYNTHROID) 50 MCG tablet TAKE 1 TABLET BEFORE BREAKFAST DAILY    meclizine (ANTIVERT) 12.5 mg tablet  Take 1 tablet (12.5 mg total) by mouth 3 (three) times daily as needed.    mometasone (ELOCON) 0.1 % ointment Apply topically once daily.    multivitamin capsule Take 1 capsule by mouth once daily.    nitroGLYCERIN (NITROSTAT) 0.4 MG SL tablet PLACE 1 TABLET UNDER THE TONGUE EVERY 5 MINUTES FOR 3 DOSES --IF NOT RELIEVED GO TO THE ER    nystatin-triamcinolone (MYCOLOG II) cream Apply topically 4 (four) times daily.    omeprazole (PRILOSEC) 20 MG capsule Take 20 mg by mouth as needed.     TENS units Sravanthi 1 application by Misc.(Non-Drug; Combo Route) route daily as needed (pain).    triamcinolone acetonide 0.1% (KENALOG) 0.1 % paste PLACE ONTO TEETH TWICE DAILY           Clinical Reference Information           Allergies as of 2/13/2017     Ace Inhibitors    Arthrotec 50  [Diclofenac-misoprostol]    Bactrim [Sulfamethoxazole-trimethoprim]    Bentyl [Dicyclomine]    Doxycycline    Imdur [Isosorbide Mononitrate]    Lomotil [Diphenoxylate-atropine]    Tramadol    Vioxx [Rofecoxib]    Lozol [Indapamide]      Immunizations Administered on Date of Encounter - 2/13/2017     None      Instructions    Continued eye care with Dr. Mccray.  Return to me as needed.       Language Assistance Services     ATTENTION: Language assistance services are available, free of charge. Please call [phone].      ATENCIÓN: Si habla español, tiene a mera disposición servicios gratuitos de asistencia lingüística. Llame al [phone].     NINA Ý: N?u b?n nói Ti?ng Vi?t, có các d?ch v? h? tr? ngôn ng? mi?n phí dành cho b?n. G?i s? [phone].         [facility] complies with applicable Federal civil rights laws and does not discriminate on the basis of race, color, national origin, age, disability, or sex.

## 2017-02-13 NOTE — LETTER
February 13, 2017      Marky Mccray MD  [address]  [facility] [address]           [facility]  [address]  [facility] [address]  Phone: [phone]  Fax: [phone]          Patient: Ewelina Arnold   MR Number: [id number]   YOB: 1945   Date of Visit: 2/13/2017       Dear Dr. Marky Mccray:    Thank you for referring Ewelina Arnold to me for evaluation. Attached you will find relevant portions of my assessment and plan of care.    If you have questions, please do not hesitate to call me. I look forward to following Ewelina Arnold along with you.    Sincerely,    David Collazo MD    Enclosure  CC:  No Recipients    If you would like to receive this communication electronically, please contact [email or web address] or [phone] to request more information on [email or web address] Link access.    For providers and/or their staff who would like to refer a patient to [facility], please contact us through our one-stop-shop provider referral line, [facility], at [phone].    If you feel you have received this communication in error or would no longer like to receive these types of communications, please e-mail [email or web address]

## 2017-02-17 ENCOUNTER — OFFICE VISIT (OUTPATIENT)
Dept: OBSTETRICS AND GYNECOLOGY | Facility: CLINIC | Age: 72
End: 2017-02-17
Payer: MEDICARE

## 2017-02-17 VITALS
DIASTOLIC BLOOD PRESSURE: 68 MMHG | WEIGHT: 135.56 LBS | BODY MASS INDEX: 22.56 KG/M2 | SYSTOLIC BLOOD PRESSURE: 132 MMHG

## 2017-02-17 DIAGNOSIS — N89.8 VAGINAL DISCHARGE: ICD-10-CM

## 2017-02-17 DIAGNOSIS — N95.2 VAGINAL ATROPHY: Primary | ICD-10-CM

## 2017-02-17 PROCEDURE — 1126F AMNT PAIN NOTED NONE PRSNT: CPT | Mod: S$GLB,,, | Performed by: OBSTETRICS & GYNECOLOGY

## 2017-02-17 PROCEDURE — 3078F DIAST BP <80 MM HG: CPT | Mod: S$GLB,,, | Performed by: OBSTETRICS & GYNECOLOGY

## 2017-02-17 PROCEDURE — 99203 OFFICE O/P NEW LOW 30 MIN: CPT | Mod: S$GLB,,, | Performed by: OBSTETRICS & GYNECOLOGY

## 2017-02-17 PROCEDURE — 1157F ADVNC CARE PLAN IN RCRD: CPT | Mod: S$GLB,,, | Performed by: OBSTETRICS & GYNECOLOGY

## 2017-02-17 PROCEDURE — 1159F MED LIST DOCD IN RCRD: CPT | Mod: S$GLB,,, | Performed by: OBSTETRICS & GYNECOLOGY

## 2017-02-17 PROCEDURE — 1160F RVW MEDS BY RX/DR IN RCRD: CPT | Mod: S$GLB,,, | Performed by: OBSTETRICS & GYNECOLOGY

## 2017-02-17 PROCEDURE — 3075F SYST BP GE 130 - 139MM HG: CPT | Mod: S$GLB,,, | Performed by: OBSTETRICS & GYNECOLOGY

## 2017-02-17 PROCEDURE — 87480 CANDIDA DNA DIR PROBE: CPT

## 2017-02-17 PROCEDURE — 99999 PR PBB SHADOW E&M-EST. PATIENT-LVL III: CPT | Mod: PBBFAC,,, | Performed by: OBSTETRICS & GYNECOLOGY

## 2017-02-17 RX ORDER — ESTRADIOL 10 UG/1
INSERT VAGINAL
Qty: 40 TABLET | Refills: 5 | Status: SHIPPED | OUTPATIENT
Start: 2017-02-17 | End: 2017-02-21

## 2017-02-17 NOTE — MR AVS SNAPSHOT
[facility] [address]  [address]  [address]  [facility] [address]  Phone: [phone]  Fax: [phone]                  Ewelina Arnold   2017 2:15 PM   Office Visit    Description:  Female : 1945   Provider:  Liz Torres MD   Department:  [facility] [address]           Reason for Visit     Well Woman                To Do List           Future Appointments        Provider Department Dept Phone    3/23/2017 8:15 AM LAB[facility] [phone]    3/31/2017 10:00 AM Kalie Walton MD [facility] [phone]    2017 10:15 AM [facility] [phone]    2017 10:30 AM SPECIMEN[place] [facility] [phone]    2017 10:00 AM Maciel Ruffin MD [facility] [phone]      Goals (5 Years of Data)     None      [facility]     [facility] Care [facility] -  Assistance  Registered nurses in the [facility] provide clinical advisement, health education, appointment booking, and other advisory services.  Call for this free service at [phone].             Medications           Message regarding Medications     Verify the changes and/or additions to your medication regime listed below are the same as discussed with your clinician today.  If any of these changes or additions are incorrect, please notify your healthcare provider.             Verify that the below list of medications is an accurate representation of the medications you are currently taking.  If none reported, the list may be blank. If incorrect, please contact your healthcare provider. Carry this list with you in case of emergency.           Current Medications     amlodipine (NORVASC) 10 MG tablet TAKE 1 TABLET EVERY DAY    ammonium lactate 12 % Crea Apply 1 application topically 2 (two) times daily as needed.    aspirin (ECOTRIN) 81 MG EC tablet  Take 1 tablet (81 mg total) by mouth once daily.    atorvastatin (LIPITOR) 40 MG tablet Take 1 tablet (40 mg total) by mouth once daily.    butalbital-aspirin-caffeine -40 mg (FIORINAL) -40 mg Cap Take 1 capsule by mouth.    carvedilol (COREG) 12.5 MG tablet TAKE 1 TABLET TWICE DAILY WITH MEALS    cholestyramine-aspartame (CHOLESTYRAMINE LIGHT) 4 gram PwPk Take by mouth.    ciprofloxacin HCl (CILOXAN) 0.3 % ophthalmic solution INSTILL 2 DROPS IN EACH EAR THREE TIMES DAILY.    citalopram (CELEXA) 10 MG tablet TAKE 1 TABLET EVERY DAY    ergocalciferol (ERGOCALCIFEROL) 50,000 unit Cap Take 1 capsule (50,000 Units total) by mouth every 30 days.    furosemide (LASIX) 20 MG tablet TAKE 1 TABLET TWICE DAILY    gentamicin-prednisoLONE (PRED G) 0.3-1 % ophthalmic drops 2 drops to the affected ear 3 times a day for 5 days.  Please let patient know that ophthalmic drops can be used in the ear.    hydrALAZINE (APRESOLINE) 10 MG tablet TAKE 2 TABLETS EVERY 12 HOURS    hydroxychloroquine (PLAQUENIL) 200 mg tablet TAKE 1 TABLET TWICE DAILY    hyoscyamine (LEVSIN/SL) 0.125 mg Subl Place 1 tablet (0.125 mg total) under the tongue 2 (two) times daily as needed.    ipratropium (ATROVENT) 0.03 % nasal spray 2 sprays by Nasal route 2 (two) times daily as needed for Rhinitis.    latanoprost 0.005 % ophthalmic solution INSTILL 1 DROP INTO BOTH EYES EVERY DAY    levothyroxine (SYNTHROID) 50 MCG tablet TAKE 1 TABLET BEFORE BREAKFAST DAILY    meclizine (ANTIVERT) 12.5 mg tablet Take 1 tablet (12.5 mg total) by mouth 3 (three) times daily as needed.    multivitamin capsule Take 1 capsule by mouth once daily.    nitroGLYCERIN (NITROSTAT) 0.4 MG SL tablet PLACE 1 TABLET UNDER THE TONGUE EVERY 5 MINUTES FOR 3 DOSES --IF NOT RELIEVED GO TO THE ER    nystatin-triamcinolone (MYCOLOG II) cream Apply topically 4 (four) times daily.    TENS units Sravanthi 1 application by Misc.(Non-Drug; Combo Route) route daily as needed (pain).     triamcinolone acetonide 0.1% (KENALOG) 0.1 % paste PLACE ONTO TEETH TWICE DAILY    betamethasone dipropionate (DIPROLENE) 0.05 % cream Apply topically 2 (two) times daily as needed.    ferrous gluconate (FERGON) 325 MG Tab Take 1 tablet (325 mg total) by mouth daily with breakfast.    fexofenadine (ALLEGRA) 180 MG tablet Take 1 tablet (180 mg total) by mouth once daily.    mometasone (ELOCON) 0.1 % ointment Apply topically once daily.    omeprazole (PRILOSEC) 20 MG capsule Take 20 mg by mouth as needed.            Clinical Reference Information           Your Vitals Were     BP Weight BMI          132/68 61.5 kg (135 lb 9.3 oz) 22.56 kg/m2        Blood Pressure          Most Recent Value    BP  132/68      Allergies as of 2/17/2017     Ace Inhibitors    Arthrotec 50  [Diclofenac-misoprostol]    Bactrim [Sulfamethoxazole-trimethoprim]    Bentyl [Dicyclomine]    Doxycycline    Imdur [Isosorbide Mononitrate]    Lomotil [Diphenoxylate-atropine]    Tramadol    Vioxx [Rofecoxib]    Lozol [Indapamide]      Immunizations Administered on Date of Encounter - 2/17/2017     None      Language Assistance Services     ATTENTION: Language assistance services are available, free of charge. Please call [phone].      ATENCIÓN: Si habla abbey, tiene a mera disposición servicios gratuitos de asistencia lingüística. Llame al [phone].     CHÚ Ý: N?u b?n nói Ti?ng Vi?t, có các d?ch v? h? tr? ngôn ng? mi?n phí dành cho b?n. G?i s? [phone].         [facility] [address] complies with applicable Federal civil rights laws and does not discriminate on the basis of race, color, national origin, age, disability, or sex.

## 2017-02-17 NOTE — PROGRESS NOTES
Gynecology    SUBJECTIVE:     Chief Complaint: Well Woman (vaginal dryness)       History of Present Illness:  71 year old who presents with vaginal dryness and vaginal irritation.  She first noticed this several months ago.  She has seen her primary care doctor for this problem.  She tried replense which did not help, then she has started on premarin cream two weeks ago.  She has only used the cream three times total.  She reports slight burning.  No itching.  She reports that she feels some vaginal swelling.  Does not know if there are any red areas.  She has had a hysterectomy. She is not sexually active. She denies leaking urine with cough/sneeze or urge. She denies feeling a bulge in the vagina.  Denies constipation.      Review of Systems:  Review of Systems   Constitutional: Negative for appetite change, fever and unexpected weight change.   Respiratory: Negative for shortness of breath.    Cardiovascular: Negative for chest pain.   Gastrointestinal: Negative for nausea and vomiting.   Genitourinary: Negative for menorrhagia, menstrual problem, pelvic pain, vaginal bleeding, vaginal discharge and vaginal pain.        OBJECTIVE:     Physical Exam:  Physical Exam   Constitutional: She is oriented to person, place, and time. She appears well-developed and well-nourished.   Pulmonary/Chest: Effort normal.   Abdominal: Soft.   Genitourinary: No labial fusion. There is no rash, tenderness, lesion or injury on the right labia. There is no rash, tenderness, lesion or injury on the left labia. Right adnexum displays no mass, no tenderness and no fullness. Left adnexum displays no mass, no tenderness and no fullness. No erythema, tenderness or bleeding in the vagina. No foreign body in the vagina. No signs of injury around the vagina. Vaginal discharge found.   Genitourinary Comments: Vaginal atrophy; thick white discharge-- possible premarin cream? Possible yeast infection   Neurological: She is alert and oriented to  person, place, and time.   Psychiatric: She has a normal mood and affect. Her behavior is normal. Judgment and thought content normal.   Nursing note and vitals reviewed.        ASSESSMENT:       ICD-10-CM ICD-9-CM    1. Vaginal atrophy N95.2 627.3 Vaginosis Screen by DNA Probe      estradiol (VAGIFEM) 10 mcg Tab   2. Vaginal discharge N89.8 623.5           Plan:      Ewelina was seen today for well woman.    Diagnoses and all orders for this visit:    Vaginal atrophy  -     Vaginosis Screen by DNA Probe  -     estradiol (VAGIFEM) 10 mcg Tab; Place one tablet in the vagina every night for two weeks, then twice a week.    Vaginal discharge    Annual recently done by PCP  Discussed using vaginal estrogen for two weeks nightly, followed by twice per week.   Will try vagifem as premarin was very expensive.   Vaginal discharge- vaginosis screen ordered; treat based on results.     Orders Placed This Encounter   Procedures    Vaginosis Screen by DNA Probe       Return for annual or prn.    Liz Torres

## 2017-02-20 ENCOUNTER — TELEPHONE (OUTPATIENT)
Dept: OBSTETRICS AND GYNECOLOGY | Facility: CLINIC | Age: 72
End: 2017-02-20

## 2017-02-20 DIAGNOSIS — N95.2 VAGINAL ATROPHY: Primary | ICD-10-CM

## 2017-02-20 NOTE — TELEPHONE ENCOUNTER
----- Message from Josephine Price sent at 2/20/2017 11:01 AM CST -----  Contact: DARINEL LOMAS [[id number]]  x_  1st Request  _  2nd Request  _  3rd Request        Who: DARINEL LOMAS [[id number]]    Why: patient states the Rx estradiol (VAGIFEM) 10 mcg Tab needs a prior authorization at [facility]  [phone] patient states she would like a call back when it is done    What Number to Call Back: [phone]    When to Expect a call back: (Before the end of the day)   -- if call after 3:00 call back will be tomorrow.

## 2017-02-21 ENCOUNTER — TELEPHONE (OUTPATIENT)
Dept: FAMILY MEDICINE | Facility: CLINIC | Age: 72
End: 2017-02-21

## 2017-02-21 ENCOUNTER — TELEPHONE (OUTPATIENT)
Dept: OBSTETRICS AND GYNECOLOGY | Facility: CLINIC | Age: 72
End: 2017-02-21

## 2017-02-21 ENCOUNTER — TELEPHONE (OUTPATIENT)
Dept: CARDIOLOGY | Facility: CLINIC | Age: 72
End: 2017-02-21

## 2017-02-21 ENCOUNTER — PATIENT MESSAGE (OUTPATIENT)
Dept: FAMILY MEDICINE | Facility: CLINIC | Age: 72
End: 2017-02-21

## 2017-02-21 ENCOUNTER — PATIENT MESSAGE (OUTPATIENT)
Dept: OBSTETRICS AND GYNECOLOGY | Facility: CLINIC | Age: 72
End: 2017-02-21

## 2017-02-21 DIAGNOSIS — R76.8 HEPATITIS C ANTIBODY POSITIVE IN BLOOD: Primary | ICD-10-CM

## 2017-02-21 NOTE — TELEPHONE ENCOUNTER
----- Message from Crystal Montano sent at 2/21/2017  3:28 PM CST -----  Contact: Self  Pt is calling in regards of her medication and seeing if a prior authorization was sent. The pt states that she would like a phone call regarding her medication. The pt can be reached at [phone]. Thanks KG

## 2017-02-21 NOTE — TELEPHONE ENCOUNTER
Spoke to patient. Informed patient that medication was covered and there was a $499.00 copay. Pt states she will continue to use cream. Pt needs medication sent to pharmacy.

## 2017-02-21 NOTE — TELEPHONE ENCOUNTER
Pt said that she has been taking the Crestor 40 mg take 1 tablet daily b/c Patient Assistance Program helped get it at a lower dose.d/c'd the atorvastatin from her med list.

## 2017-02-21 NOTE — TELEPHONE ENCOUNTER
----- Message from Glendy Chamberlain sent at 2/21/2017  3:23 PM CST -----  Contact: Self/[phone] cell  Pt is calling to speak with someone in the office regarding her hepatitis C test being positive. Please call and advise.    Thank you!

## 2017-02-22 ENCOUNTER — LAB VISIT (OUTPATIENT)
Dept: LAB | Facility: HOSPITAL | Age: 72
End: 2017-02-22
Attending: FAMILY MEDICINE
Payer: MEDICARE

## 2017-02-22 ENCOUNTER — TELEPHONE (OUTPATIENT)
Dept: FAMILY MEDICINE | Facility: CLINIC | Age: 72
End: 2017-02-22

## 2017-02-22 DIAGNOSIS — N95.2 VAGINAL ATROPHY: ICD-10-CM

## 2017-02-22 DIAGNOSIS — R76.8 HEPATITIS C ANTIBODY POSITIVE IN BLOOD: ICD-10-CM

## 2017-02-22 LAB
CANDIDA RRNA VAG QL PROBE: NEGATIVE
G VAGINALIS RRNA GENITAL QL PROBE: POSITIVE
T VAGINALIS RRNA GENITAL QL PROBE: NEGATIVE

## 2017-02-22 PROCEDURE — 36415 COLL VENOUS BLD VENIPUNCTURE: CPT | Mod: PO

## 2017-02-22 PROCEDURE — 87522 HEPATITIS C REVRS TRNSCRPJ: CPT

## 2017-02-22 NOTE — TELEPHONE ENCOUNTER
Spoke with patient was very confused since you sent  that her Vit D was normal to her portal but she had a calcitriol lab done.  Advised patient that calcitrol is vit. D.  Patient verbalizes understanding.  Patient would like to speak with you concerning the Hep C results, states she had the confirmatory test and knows the results are not back.  Patient also states she spoke with the patient assistance program with [facility] to help pay for premarin.  Patient states a form from them will be sent to our office and would like Dr. Ruth to fill it out and fax back.

## 2017-02-22 NOTE — TELEPHONE ENCOUNTER
----- Message from Deanna Maradiaga sent at 2/22/2017  2:07 PM CST -----  Contact: self     Please be on the look out for a fax  From [facility] Patient Assistance Program. Patient states this was faxed three weeks ago.    Results for her  calcitriol test is needed.

## 2017-02-22 NOTE — TELEPHONE ENCOUNTER
Pt said she had a bottle of the Imdur in her medicine cabinet and she can not remember if she was suppose to be taking this or not.Researched her chart and it is noted in her allergies that she was taking Imdur but it was stopped b/c she c/o nausea.Pt reports understanding of these instructions & said she will make note of that.

## 2017-02-22 NOTE — TELEPHONE ENCOUNTER
----- Message from Jolie العلي sent at 2/21/2017  4:55 PM CST -----  Contact: pt   Pt called about her medications.  She is setting up her meds and is curious why she is taking the isosorbide, she doesn't remember.  She can be reached @ [phone].  Dr. PURDY's pt and LOV 10/31/16.  Thanks

## 2017-02-23 ENCOUNTER — PATIENT MESSAGE (OUTPATIENT)
Dept: FAMILY MEDICINE | Facility: CLINIC | Age: 72
End: 2017-02-23

## 2017-02-23 ENCOUNTER — PATIENT MESSAGE (OUTPATIENT)
Dept: OBSTETRICS AND GYNECOLOGY | Facility: CLINIC | Age: 72
End: 2017-02-23

## 2017-02-23 DIAGNOSIS — B96.89 BV (BACTERIAL VAGINOSIS): Primary | ICD-10-CM

## 2017-02-23 DIAGNOSIS — N76.0 BV (BACTERIAL VAGINOSIS): Primary | ICD-10-CM

## 2017-02-23 RX ORDER — METRONIDAZOLE 500 MG/1
500 TABLET ORAL EVERY 12 HOURS
Qty: 14 TABLET | Refills: 0 | Status: SHIPPED | OUTPATIENT
Start: 2017-02-23 | End: 2017-03-02

## 2017-02-23 NOTE — TELEPHONE ENCOUNTER
Patient would like to speak with you concerning the Hep C results, knows the results are not back from the confirmatory test message was also sent yesterday.  Please also refer to telephone encounter dated 2/22/17.

## 2017-02-24 ENCOUNTER — PATIENT MESSAGE (OUTPATIENT)
Dept: OBSTETRICS AND GYNECOLOGY | Facility: CLINIC | Age: 72
End: 2017-02-24

## 2017-02-24 LAB
HCV LOG: <1.08 LOG (10) IU/ML
HCV RNA QUANT PCR: <12 IU/ML
HCV, QUALITATIVE: NOT DETECTED IU/ML

## 2017-03-01 RX ORDER — ROSUVASTATIN CALCIUM 40 MG/1
40 TABLET, COATED ORAL NIGHTLY
Qty: 90 TABLET | Refills: 3
Start: 2017-03-01 | End: 2017-12-12 | Stop reason: SDUPTHER

## 2017-03-03 ENCOUNTER — TELEPHONE (OUTPATIENT)
Dept: PHARMACY | Facility: CLINIC | Age: 72
End: 2017-03-03

## 2017-03-06 ENCOUNTER — TELEPHONE (OUTPATIENT)
Dept: GASTROENTEROLOGY | Facility: CLINIC | Age: 72
End: 2017-03-06

## 2017-03-06 DIAGNOSIS — Z85.038 HISTORY OF COLON CANCER: ICD-10-CM

## 2017-03-06 DIAGNOSIS — R63.4 WEIGHT LOSS: Primary | ICD-10-CM

## 2017-03-06 NOTE — TELEPHONE ENCOUNTER
----- Message from Avani Cortes MA sent at 3/6/2017  4:22 PM CST -----  Contact: [phone]/self  Spoke with Pt and she would like a call from you.     ----- Message -----     From: Ammy Mayorga     Sent: 3/6/2017   2:03 PM       To: [facility] Staff    Pt requesting to speak with you about her previous appointment.  Please advise

## 2017-03-06 NOTE — TELEPHONE ENCOUNTER
Pt stated that she would like a call back from the NP.  ----- Message from Ammy Mayorga sent at 3/6/2017  2:03 PM CST -----  Contact: [phone]/self  Pt requesting to speak with you about her previous appointment.  Please advise

## 2017-03-06 NOTE — TELEPHONE ENCOUNTER
Reports that she has continued to have weight loss.    She denies nausea, vomiting.    History of colon cancer.    Please call to schedule CT abd pelvis.

## 2017-03-07 ENCOUNTER — TELEPHONE (OUTPATIENT)
Dept: GASTROENTEROLOGY | Facility: CLINIC | Age: 72
End: 2017-03-07

## 2017-03-07 ENCOUNTER — LAB VISIT (OUTPATIENT)
Dept: LAB | Facility: HOSPITAL | Age: 72
End: 2017-03-07
Attending: FAMILY MEDICINE
Payer: MEDICARE

## 2017-03-07 DIAGNOSIS — R10.9 ABDOMINAL PAIN, OTHER SPECIFIED SITE: ICD-10-CM

## 2017-03-07 DIAGNOSIS — R10.9 ABDOMINAL PAIN, OTHER SPECIFIED SITE: Primary | ICD-10-CM

## 2017-03-07 LAB
ALBUMIN SERPL BCP-MCNC: 3.1 G/DL
ALP SERPL-CCNC: 57 U/L
ALT SERPL W/O P-5'-P-CCNC: 24 U/L
ANION GAP SERPL CALC-SCNC: 6 MMOL/L
AST SERPL-CCNC: 40 U/L
BILIRUB SERPL-MCNC: 0.3 MG/DL
BUN SERPL-MCNC: 21 MG/DL
CALCIUM SERPL-MCNC: 8.6 MG/DL
CHLORIDE SERPL-SCNC: 111 MMOL/L
CO2 SERPL-SCNC: 24 MMOL/L
CREAT SERPL-MCNC: 1.4 MG/DL
EST. GFR  (AFRICAN AMERICAN): 43.6 ML/MIN/1.73 M^2
EST. GFR  (NON AFRICAN AMERICAN): 37.8 ML/MIN/1.73 M^2
GLUCOSE SERPL-MCNC: 95 MG/DL
POTASSIUM SERPL-SCNC: 4.4 MMOL/L
PROT SERPL-MCNC: 7 G/DL
SODIUM SERPL-SCNC: 141 MMOL/L

## 2017-03-07 PROCEDURE — 80053 COMPREHEN METABOLIC PANEL: CPT

## 2017-03-07 PROCEDURE — 36415 COLL VENOUS BLD VENIPUNCTURE: CPT | Mod: PO

## 2017-03-07 NOTE — TELEPHONE ENCOUNTER
Spoke with Pt to let her know that her labs were ordered and that she does not have to fast. Verbal agreement.

## 2017-03-08 ENCOUNTER — PATIENT MESSAGE (OUTPATIENT)
Dept: FAMILY MEDICINE | Facility: CLINIC | Age: 72
End: 2017-03-08

## 2017-03-08 ENCOUNTER — TELEPHONE (OUTPATIENT)
Dept: FAMILY MEDICINE | Facility: CLINIC | Age: 72
End: 2017-03-08

## 2017-03-08 ENCOUNTER — TELEPHONE (OUTPATIENT)
Dept: GASTROENTEROLOGY | Facility: CLINIC | Age: 72
End: 2017-03-08

## 2017-03-08 ENCOUNTER — HOSPITAL ENCOUNTER (OUTPATIENT)
Dept: RADIOLOGY | Facility: OTHER | Age: 72
Discharge: HOME | End: 2017-03-08
Attending: NURSE PRACTITIONER
Payer: MEDICARE

## 2017-03-08 DIAGNOSIS — Z85.038 HISTORY OF COLON CANCER: ICD-10-CM

## 2017-03-08 DIAGNOSIS — R63.4 WEIGHT LOSS: Primary | ICD-10-CM

## 2017-03-08 DIAGNOSIS — R63.4 WEIGHT LOSS: ICD-10-CM

## 2017-03-08 PROCEDURE — 25500020 PHARM REV CODE 255: Performed by: NURSE PRACTITIONER

## 2017-03-08 PROCEDURE — 74177 CT ABD & PELVIS W/CONTRAST: CPT | Mod: 26,,, | Performed by: INTERNAL MEDICINE

## 2017-03-08 PROCEDURE — 74177 CT ABD & PELVIS W/CONTRAST: CPT | Mod: TC

## 2017-03-08 RX ADMIN — IOHEXOL 25 ML: 350 INJECTION, SOLUTION INTRAVENOUS at 08:03

## 2017-03-08 RX ADMIN — IOHEXOL 75 ML: 350 INJECTION, SOLUTION INTRAVENOUS at 09:03

## 2017-03-08 NOTE — TELEPHONE ENCOUNTER
Received approval letter from [employer] patient assistance program stating they approved premarin vaginal cream and medication will be shipped to our office. Also received the premarin vaginal cream per mail patient advised medication is ready for pickup.  Medication placed at the .

## 2017-03-08 NOTE — TELEPHONE ENCOUNTER
Spoke with Pt about her CT Scan and pt shows some concerns about it. I informed the Pt that I would forward a message to the NP and have her get back with her. ----- Message from GERALDINE Hayes sent at 3/8/2017 12:00 PM CST -----  Let pt know that CT is ok

## 2017-03-09 ENCOUNTER — PATIENT MESSAGE (OUTPATIENT)
Dept: OBSTETRICS AND GYNECOLOGY | Facility: CLINIC | Age: 72
End: 2017-03-09

## 2017-03-09 ENCOUNTER — PATIENT MESSAGE (OUTPATIENT)
Dept: GASTROENTEROLOGY | Facility: CLINIC | Age: 72
End: 2017-03-09

## 2017-03-10 ENCOUNTER — PATIENT MESSAGE (OUTPATIENT)
Dept: GASTROENTEROLOGY | Facility: CLINIC | Age: 72
End: 2017-03-10

## 2017-03-10 ENCOUNTER — TELEPHONE (OUTPATIENT)
Dept: GASTROENTEROLOGY | Facility: CLINIC | Age: 72
End: 2017-03-10

## 2017-03-10 DIAGNOSIS — R68.81 EARLY SATIETY: ICD-10-CM

## 2017-03-10 DIAGNOSIS — R19.5 LOOSE STOOLS: Primary | ICD-10-CM

## 2017-03-10 DIAGNOSIS — K90.9 INTESTINAL MALABSORPTION, UNSPECIFIED TYPE: ICD-10-CM

## 2017-03-10 DIAGNOSIS — R63.4 WEIGHT LOSS: ICD-10-CM

## 2017-03-10 DIAGNOSIS — R63.0 DECREASED APPETITE: ICD-10-CM

## 2017-03-10 NOTE — TELEPHONE ENCOUNTER
Patient continues to have complaints of decreased appetite, weight loss, early satiety and concerns for malabsorption.    Please schedule lab that I have ordered and EGD.

## 2017-03-13 ENCOUNTER — LAB VISIT (OUTPATIENT)
Dept: LAB | Facility: HOSPITAL | Age: 72
End: 2017-03-13
Attending: FAMILY MEDICINE
Payer: MEDICARE

## 2017-03-13 DIAGNOSIS — R19.5 LOOSE STOOLS: ICD-10-CM

## 2017-03-13 LAB — IGA SERPL-MCNC: 542 MG/DL

## 2017-03-13 PROCEDURE — 36415 COLL VENOUS BLD VENIPUNCTURE: CPT | Mod: PO

## 2017-03-13 PROCEDURE — 82784 ASSAY IGA/IGD/IGG/IGM EACH: CPT

## 2017-03-13 PROCEDURE — 83516 IMMUNOASSAY NONANTIBODY: CPT

## 2017-03-14 ENCOUNTER — TELEPHONE (OUTPATIENT)
Dept: GASTROENTEROLOGY | Facility: CLINIC | Age: 72
End: 2017-03-14

## 2017-03-14 NOTE — TELEPHONE ENCOUNTER
Spoke with Pt and was able to verify that her EGD is scheduled for 3/16/17. Instructions were given to pt. Verbal agreement.   ----- Message from Yanira Montiel sent at 3/14/2017 11:15 AM CDT -----  No.  [phone]   Patient called earlier.   Patient would like to know if the procedure has been scheduled for Thursday, 3/16/17.  Patient asked that I send the message urgent.

## 2017-03-16 ENCOUNTER — ANESTHESIA EVENT (OUTPATIENT)
Dept: ENDOSCOPY | Facility: HOSPITAL | Age: 72
End: 2017-03-16
Payer: MEDICARE

## 2017-03-16 ENCOUNTER — SURGERY (OUTPATIENT)
Age: 72
End: 2017-03-16

## 2017-03-16 ENCOUNTER — ANESTHESIA (OUTPATIENT)
Dept: ENDOSCOPY | Facility: HOSPITAL | Age: 72
End: 2017-03-16
Payer: MEDICARE

## 2017-03-16 ENCOUNTER — HOSPITAL ENCOUNTER (OUTPATIENT)
Facility: HOSPITAL | Age: 72
Discharge: HOME | End: 2017-03-16
Attending: INTERNAL MEDICINE | Admitting: INTERNAL MEDICINE
Payer: MEDICARE

## 2017-03-16 DIAGNOSIS — R63.4 WEIGHT LOSS, ABNORMAL: ICD-10-CM

## 2017-03-16 PROBLEM — R63.0 DECREASED APPETITE: Status: ACTIVE | Noted: 2017-03-16

## 2017-03-16 LAB — TTG IGA SER IA-ACNC: 7 UNITS

## 2017-03-16 PROCEDURE — 88305 TISSUE EXAM BY PATHOLOGIST: CPT | Performed by: PATHOLOGY

## 2017-03-16 PROCEDURE — 25000003 PHARM REV CODE 250: Performed by: INTERNAL MEDICINE

## 2017-03-16 PROCEDURE — 94640 AIRWAY INHALATION TREATMENT: CPT

## 2017-03-16 PROCEDURE — 25000242 PHARM REV CODE 250 ALT 637 W/ HCPCS: Performed by: ANESTHESIOLOGY

## 2017-03-16 PROCEDURE — 37000008 HC ANESTHESIA 1ST 15 MINUTES: Performed by: INTERNAL MEDICINE

## 2017-03-16 PROCEDURE — 63600175 PHARM REV CODE 636 W HCPCS: Performed by: NURSE ANESTHETIST, CERTIFIED REGISTERED

## 2017-03-16 PROCEDURE — 37000009 HC ANESTHESIA EA ADD 15 MINS: Performed by: INTERNAL MEDICINE

## 2017-03-16 PROCEDURE — 43239 EGD BIOPSY SINGLE/MULTIPLE: CPT | Mod: ,,, | Performed by: INTERNAL MEDICINE

## 2017-03-16 PROCEDURE — 27201012 HC FORCEPS, HOT/COLD, DISP: Performed by: INTERNAL MEDICINE

## 2017-03-16 PROCEDURE — 43239 EGD BIOPSY SINGLE/MULTIPLE: CPT | Performed by: INTERNAL MEDICINE

## 2017-03-16 PROCEDURE — 88305 TISSUE EXAM BY PATHOLOGIST: CPT | Mod: 26,,, | Performed by: PATHOLOGY

## 2017-03-16 PROCEDURE — 25000003 PHARM REV CODE 250: Performed by: NURSE ANESTHETIST, CERTIFIED REGISTERED

## 2017-03-16 RX ORDER — ALBUTEROL SULFATE 0.83 MG/ML
2.5 SOLUTION RESPIRATORY (INHALATION) ONCE
Status: COMPLETED | OUTPATIENT
Start: 2017-03-16 | End: 2017-03-16

## 2017-03-16 RX ORDER — LIDOCAINE HCL/PF 100 MG/5ML
SYRINGE (ML) INTRAVENOUS
Status: DISCONTINUED | OUTPATIENT
Start: 2017-03-16 | End: 2017-03-16

## 2017-03-16 RX ORDER — PROPOFOL 10 MG/ML
VIAL (ML) INTRAVENOUS
Status: DISCONTINUED | OUTPATIENT
Start: 2017-03-16 | End: 2017-03-16

## 2017-03-16 RX ORDER — ALBUTEROL SULFATE 0.83 MG/ML
SOLUTION RESPIRATORY (INHALATION)
Status: DISCONTINUED
Start: 2017-03-16 | End: 2017-03-16 | Stop reason: HOSPADM

## 2017-03-16 RX ORDER — SODIUM CHLORIDE 9 MG/ML
INJECTION, SOLUTION INTRAVENOUS CONTINUOUS
Status: DISCONTINUED | OUTPATIENT
Start: 2017-03-17 | End: 2017-03-16 | Stop reason: HOSPADM

## 2017-03-16 RX ADMIN — PROPOFOL 50 MG: 10 INJECTION, EMULSION INTRAVENOUS at 11:03

## 2017-03-16 RX ADMIN — PROPOFOL 20 MG: 10 INJECTION, EMULSION INTRAVENOUS at 11:03

## 2017-03-16 RX ADMIN — SODIUM CHLORIDE: 0.9 INJECTION, SOLUTION INTRAVENOUS at 10:03

## 2017-03-16 RX ADMIN — LIDOCAINE HYDROCHLORIDE 50 MG: 20 INJECTION, SOLUTION INTRAVENOUS at 11:03

## 2017-03-16 RX ADMIN — TOPICAL ANESTHETIC 1 EACH: 200 SPRAY DENTAL; PERIODONTAL at 11:03

## 2017-03-16 RX ADMIN — ALBUTEROL SULFATE 2.5 MG: 2.5 SOLUTION RESPIRATORY (INHALATION) at 12:03

## 2017-03-16 NOTE — H&P
"Short Stay Endoscopy History and Physical  [facility] - [place] [facility] [place]    PCP: Kalie Walton MD   Referring MD: GERALDINE Satnos    Procedure - EGD  ASA - per anesthesia  History of Anesthesia problems - no  Family history Anesthesia problems -  no   Plan of anesthesia - MAC    HPI:  This is a 71 y.o. female here for evaluation of : loose stools with abdominal discomfort after bowel movements described as a "crampiness". She reports she also has formed bowel movements. She has not been able to associate anything in particular with her symptoms, although reports that is she eats a lot of fruits and vegetables, she may have looser stools.  She has a history of colon cancer with partial colon resection in the past.  She has had normal colonoscopy this year. Reports a 7 pound weight loss since Nov 1. Reports decreased appetite related to her complaints.   Denies nausea, vomiting or abdominal pain apart from cramping. Levsin has been minimally helpful with cramping.   Denies blood with bowel movements or black stools.  Has a known lactose intolerance.    Reflux - no  Dysphagia - no  Abdominal pain - Yes  Diarrhea - Yes    ROS:  Constitutional: No fevers, chills, No weight loss  CV: No chest pain  Pulm: No cough, No shortness of breath  Ophtho: No vision changes  GI: see HPI  Derm: No rash    Medical History:  has a past medical history of Allergy; Anatomical narrow angle glaucoma; Anemia; Aortic atherosclerosis; Arthritis; Cataract; Chronic kidney disease; Chronic sciatica of left side; Coronary artery disease; Depression; Dry eyes; GERD (gastroesophageal reflux disease); History of colon cancer; Hyperlipidemia; Hypertension; Hypothyroidism; Left ventricular diastolic dysfunction with preserved systolic function; Lupus (systemic lupus erythematosus) (8/17/2012); Osteoarthritis of cervical spine (8/17/2012); Osteopenia; and PAD (peripheral artery disease).    Surgical History:  has a past surgical history " that includes Hernia repair; Nephrectomy (1985); Cardiac catheterization (07/27/2011); Peripheral Iridotomy both eyes (1994); Oophorectomy; Hand surgery (Bilateral, 1996 & 1997); Hysterectomy (1983); Cholecystectomy (1999); Colon surgery (2000 & 2003); Thyroidectomy, partial (2006); Colonoscopy (N/A, 4/14/2016); and Eye surgery.    Family History: family history includes Diabetes in her maternal grandmother and son; Heart attack in her mother; Heart disease in her father; Hypertension in her brother, father, mother, and sister; Kidney disease in her brother; Kidney failure in her brother; No Known Problems in her daughter. There is no history of Acne, Eczema, Lupus, Psoriasis, or Melanoma.. Otherwise no colon cancer, inflammatory bowel disease, or GI malignancies.    Social History:  reports that she quit smoking about 32 years ago. She has a 45.00 pack-year smoking history. She has never used smokeless tobacco. She reports that she does not drink alcohol or use illicit drugs.    Review of patient's allergies indicates:   Allergen Reactions    Ace inhibitors      Other reaction(s): Lips Swelling    Arthrotec 50  [diclofenac-misoprostol]      Other reaction(s): Unknown    Bactrim [sulfamethoxazole-trimethoprim]      Pt would like this medication to be added due to elevation of creatinine with single kidney.    Bentyl [dicyclomine]      Not effective    Doxycycline      nausea    Imdur [isosorbide mononitrate] Nausea Only    Lomotil [diphenoxylate-atropine]      Stomach cramps, pt vomitted    Tramadol Nausea Only    Vioxx [rofecoxib]      Other reaction(s): lips swelling    Lozol [indapamide] Rash       Medications:   Prescriptions Prior to Admission   Medication Sig Dispense Refill Last Dose    amlodipine (NORVASC) 10 MG tablet TAKE 1 TABLET EVERY DAY 90 tablet 3 3/16/2017 at Unknown time    ammonium lactate 12 % Crea Apply 1 application topically 2 (two) times daily as needed. 385 g 2 3/15/2017 at  Unknown time    carvedilol (COREG) 12.5 MG tablet TAKE 1 TABLET TWICE DAILY WITH MEALS 180 tablet 3 3/16/2017 at Unknown time    citalopram (CELEXA) 10 MG tablet TAKE 1 TABLET EVERY DAY 90 tablet 3 3/15/2017 at Unknown time    conjugated estrogens (PREMARIN) vaginal cream Use 1g nightly for two weeks, then 1g twice per week. 30 g 12 3/15/2017 at Unknown time    ergocalciferol (ERGOCALCIFEROL) 50,000 unit Cap Take 1 capsule (50,000 Units total) by mouth every 30 days. 3 capsule 3 Past Week at Unknown time    ferrous gluconate (FERGON) 325 MG Tab Take 1 tablet (325 mg total) by mouth daily with breakfast.  0 Past Week at Unknown time    furosemide (LASIX) 20 MG tablet TAKE 1 TABLET TWICE DAILY 180 tablet 11 3/15/2017 at Unknown time    hydrALAZINE (APRESOLINE) 10 MG tablet TAKE 2 TABLETS EVERY 12 HOURS 360 tablet 3 3/16/2017 at Unknown time    hydroxychloroquine (PLAQUENIL) 200 mg tablet TAKE 1 TABLET TWICE DAILY 180 tablet 1 3/15/2017 at Unknown time    latanoprost 0.005 % ophthalmic solution INSTILL 1 DROP INTO BOTH EYES EVERY DAY 3 Bottle 3 3/15/2017 at Unknown time    levothyroxine (SYNTHROID) 50 MCG tablet TAKE 1 TABLET BEFORE BREAKFAST DAILY 90 tablet 3 3/15/2017 at Unknown time    meclizine (ANTIVERT) 12.5 mg tablet Take 1 tablet (12.5 mg total) by mouth 3 (three) times daily as needed. 30 tablet 1 3/15/2017 at Unknown time    multivitamin capsule Take 1 capsule by mouth once daily.   Past Week at Unknown time    nystatin-triamcinolone (MYCOLOG II) cream Apply topically 4 (four) times daily. 60 g 0 3/15/2017 at Unknown time    rosuvastatin (CRESTOR) 40 MG Tab Take 1 tablet (40 mg total) by mouth every evening. 90 tablet 3 3/15/2017 at Unknown time    triamcinolone acetonide 0.1% (KENALOG) 0.1 % paste PLACE ONTO TEETH TWICE DAILY (Patient taking differently: PLACE ONTO TEETH PRN) 5 g 0 3/15/2017 at Unknown time    aspirin (ECOTRIN) 81 MG EC tablet Take 1 tablet (81 mg total) by mouth once daily.   0 Taking    betamethasone dipropionate (DIPROLENE) 0.05 % cream Apply topically 2 (two) times daily as needed. 45 g 2 Taking    butalbital-aspirin-caffeine -40 mg (FIORINAL) -40 mg Cap Take 1 capsule by mouth.   More than a month at Unknown time    cholestyramine-aspartame (CHOLESTYRAMINE LIGHT) 4 gram PwPk Take by mouth.   Taking    ciprofloxacin HCl (CILOXAN) 0.3 % ophthalmic solution INSTILL 2 DROPS IN EACH EAR THREE TIMES DAILY. 5 mL 0 Taking    fexofenadine (ALLEGRA) 180 MG tablet Take 1 tablet (180 mg total) by mouth once daily. 30 tablet 2 Taking    gentamicin-prednisoLONE (PRED G) 0.3-1 % ophthalmic drops 2 drops to the affected ear 3 times a day for 5 days.  Please let patient know that ophthalmic drops can be used in the ear. 3 mL 0 Taking    hyoscyamine (LEVSIN/SL) 0.125 mg Subl Place 1 tablet (0.125 mg total) under the tongue 2 (two) times daily as needed. 60 tablet 1 More than a month at Unknown time    ipratropium (ATROVENT) 0.03 % nasal spray 2 sprays by Nasal route 2 (two) times daily as needed for Rhinitis. 30 mL 0 More than a month at Unknown time    mometasone (ELOCON) 0.1 % ointment Apply topically once daily. 45 g 3 Taking    nitroGLYCERIN (NITROSTAT) 0.4 MG SL tablet PLACE 1 TABLET UNDER THE TONGUE EVERY 5 MINUTES FOR 3 DOSES --IF NOT RELIEVED GO TO THE ER 25 tablet 6 More than a month at Unknown time    omeprazole (PRILOSEC) 20 MG capsule Take 20 mg by mouth as needed.    Taking    TENS units Sravanthi 1 application by Misc.(Non-Drug; Combo Route) route daily as needed (pain). 1 Device 0 More than a month at Unknown time       Physical Exam:    Vital Signs:   Vitals:    03/16/17 0959   BP: (!) 170/76   Pulse:    Resp:    Temp:        General Appearance: Well appearing in no acute distress  Eyes:    No scleral icterus  ENT: Neck supple, Lips, mucosa, and tongue normal; teeth and gums normal  Lungs: CTA anteriorly  Heart:  Regular rate, S1, S2 normal, 2/6 systolic murmur  heard.  Abdomen: Mildly obese, soft, non tender, non distended with normal bowel sounds. No hepatosplenomegaly, ascites, or mass.  Extremities: No edema  Skin: No rash    Labs:  Lab Results   Component Value Date    WBC 3.35 (L) 01/24/2017    HGB 10.9 (L) 01/24/2017    HCT 34.1 (L) 01/24/2017     01/24/2017    CHOL 137 01/24/2017    TRIG 57 01/24/2017    HDL 54 01/24/2017    ALT 24 03/07/2017    AST 40 03/07/2017     03/07/2017    K 4.4 03/07/2017     (H) 03/07/2017    CREATININE 1.4 03/07/2017    BUN 21 03/07/2017    CO2 24 03/07/2017    TSH 1.190 01/24/2017    INR 1.1 05/09/2013    GLUF 79 12/02/2011    HGBA1C 5.7 01/24/2017       Plan:  Will proceed with planned colonoscopy. I have explained the risks and benefits of endoscopy procedures to the patient including but not limited to bleeding, perforation, infection, and death.        Yogesh Fernandes MD, FACP, FACG, AGAF [facility]  [address], [address], [place] [address]  Phone: [phone] Fax: [phone]    [address], [address], [place] [address]  Phone: [phone] Fax: [phone]

## 2017-03-16 NOTE — TRANSFER OF CARE
"Anesthesia Transfer of Care Note    Patient: Ewelina Arnold    Procedure(s) Performed: Procedure(s) (LRB):  ESOPHAGOGASTRODUODENOSCOPY (EGD) (N/A)    Patient location: GI    Anesthesia Type: MAC    Transport from OR: Transported from OR on room air with adequate spontaneous ventilation    Post pain: adequate analgesia    Post assessment: no apparent anesthetic complications    Post vital signs: stable    Level of consciousness: awake    Nausea/Vomiting: no nausea/vomiting    Complications: none          Last vitals:   Visit Vitals    BP (!) 170/76    Pulse 63    Temp 36.8 °C (98.2 °F)    Resp 18    Ht 5' 5" (1.651 m)    Wt 59 kg (130 lb)    SpO2 100%    Breastfeeding No    BMI 21.63 kg/m2     "

## 2017-03-16 NOTE — ANESTHESIA PREPROCEDURE EVALUATION
03/16/2017  Ewelina Arnold is a 71 y.o., female.    [facility] Anesthesia Evaluation    I have reviewed the Patient Summary Reports.    I have reviewed the Nursing Notes.   I have reviewed the Medications.     Review of Systems  Anesthesia Hx:  No problems with previous Anesthesia   Denies Personal Hx of Anesthesia complications.   Social:  Non-Smoker, No Alcohol Use    Hematology/Oncology:  Hematology Normal        Cardiovascular:   Exercise tolerance: good Hypertension CAD ( 1 stent placed 5 years ago)   ECG has been reviewed.    Pulmonary:  Pulmonary Normal    Renal/:   Chronic Renal Disease ( CKD 3)    Hepatic/GI:   GERD, poorly controlled    Musculoskeletal:   Arthritis     Neurological:  Neurology Normal    Endocrine:   Hypothyroidism    Psych:   depression          Physical Exam  General:  Well nourished    Airway/Jaw/Neck:  Airway Findings: Mouth Opening: Normal Tongue: Normal  Mallampati: II  TM Distance: Normal, at least 6 cm  Jaw/Neck Findings:     Neck ROM: Normal ROM  Neck Findings: Normal     Dental:  Dental Findings: lower partial dentures   Chest/Lungs:  Chest/Lungs Findings: Clear to auscultation, Normal Respiratory Rate     Heart/Vascular:  Heart Findings: Rate: Normal  Rhythm: Regular Rhythm  Sounds: Normal     Abdomen:  Abdomen Findings:  Normal, Soft, Nontender      Skin:  Skin Findings: Normal    Mental Status:  Mental Status Findings:  Alert and Oriented, Cooperative       Stress Echo 3/2016: Normal EF 55-60%, diastolic dysfunction, no evidence of ischemia    EKG 7/2016:   Sinus bradycardia with Premature atrial complexes  Possible Left atrial enlargement  Otherwise normal ECG      Anesthesia Plan  Type of Anesthesia, risks & benefits discussed:  Anesthesia Type:  MAC  Patient's Preference:   Intra-op Monitoring Plan:   Intra-op Monitoring Plan Comments:   Post Op Pain Control Plan:   Post Op  Pain Control Plan Comments:   Induction:    Beta Blocker:  Patient is on a Beta-Blocker and has received one dose within the past 24 hours (No further documentation required).       Informed Consent: Patient understands risks and agrees with Anesthesia plan.  Questions answered. Anesthesia consent signed with patient.  ASA Score: 3     Day of Surgery Review of History & Physical: I have interviewed and examined the patient. I have reviewed the patient's H&P dated:  There are no significant changes.  H&P update referred to the provider.         Ready For Surgery From Anesthesia Perspective.

## 2017-03-16 NOTE — IP AVS SNAPSHOT
[facility]  [address]  [place] [address]  Phone: [phone]           Patient Discharge Instructions     Our goal is to set you up for success. This packet includes information on your condition, medications, and your home care. It will help you to care for yourself so you don't get sicker and need to go back to the hospital.     Please ask your nurse if you have any questions.        There are many details to remember when preparing to leave the hospital. Here is what you will need to do:    1. Take your medicine. If you are prescribed medications, review your Medication List in the following pages. You may have new medications to  at the pharmacy and others that you'll need to stop taking. Review the instructions for how and when to take your medications. Talk with your doctor or nurses if you are unsure of what to do.     2. Go to your follow-up appointments. Specific follow-up information is listed in the following pages. Your may be contacted by a transition nurse or clinical provider about future appointments. Be sure we have all of the phone numbers to reach you, if needed. Please contact your provider's office if you are unable to make an appointment.     3. Watch for warning signs. Your doctor or nurse will give you detailed warning signs to watch for and when to call for assistance. These instructions may also include educational information about your condition. If you experience any of warning signs to your health, call your doctor.               ** Verify the list of medication(s) below is accurate and up to date. Carry this with you in case of emergency. If your medications have changed, please notify your healthcare provider.             Medication List      CHANGE how you take these medications        Additional Info                      triamcinolone acetonide 0.1% 0.1 % paste   Commonly known as:  KENALOG   Quantity:  5 g   Refills:  0   What changed:  See the new  instructions.    Instructions:  PLACE ONTO TEETH TWICE DAILY     Begin Date    AM    Noon    PM    Bedtime         CONTINUE taking these medications        Additional Info                      amlodipine 10 MG tablet   Commonly known as:  NORVASC   Quantity:  90 tablet   Refills:  3    Instructions:  TAKE 1 TABLET EVERY DAY     Begin Date    AM    Noon    PM    Bedtime       ammonium lactate 12 % Crea   Quantity:  385 g   Refills:  2   Dose:  1 application    Instructions:  Apply 1 application topically 2 (two) times daily as needed.     Begin Date    AM    Noon    PM    Bedtime       aspirin 81 MG EC tablet   Commonly known as:  ECOTRIN   Refills:  0   Dose:  81 mg    Instructions:  Take 1 tablet (81 mg total) by mouth once daily.     Begin Date    AM    Noon    PM    Bedtime       betamethasone dipropionate 0.05 % cream   Commonly known as:  DIPROLENE   Quantity:  45 g   Refills:  2    Instructions:  Apply topically 2 (two) times daily as needed.     Begin Date    AM    Noon    PM    Bedtime       butalbital-aspirin-caffeine -40 mg -40 mg Cap   Commonly known as:  FIORINAL   Refills:  0   Dose:  1 capsule    Instructions:  Take 1 capsule by mouth.     Begin Date    AM    Noon    PM    Bedtime       carvedilol 12.5 MG tablet   Commonly known as:  COREG   Quantity:  180 tablet   Refills:  3    Instructions:  TAKE 1 TABLET TWICE DAILY WITH MEALS     Begin Date    AM    Noon    PM    Bedtime       citalopram 10 MG tablet   Commonly known as:  CELEXA   Quantity:  90 tablet   Refills:  3    Instructions:  TAKE 1 TABLET EVERY DAY     Begin Date    AM    Noon    PM    Bedtime       conjugated estrogens vaginal cream   Commonly known as:  PREMARIN   Quantity:  30 g   Refills:  12    Instructions:  Use 1g nightly for two weeks, then 1g twice per week.     Begin Date    AM    Noon    PM    Bedtime       ergocalciferol 50,000 unit Cap   Commonly known as:  ERGOCALCIFEROL   Quantity:  3 capsule   Refills:  3    Dose:  61132 Units    Instructions:  Take 1 capsule (50,000 Units total) by mouth every 30 days.     Begin Date    AM    Noon    PM    Bedtime       ferrous gluconate 325 MG Tab   Commonly known as:  FERGON   Refills:  0   Dose:  325 mg    Instructions:  Take 1 tablet (325 mg total) by mouth daily with breakfast.     Begin Date    AM    Noon    PM    Bedtime       fexofenadine 180 MG tablet   Commonly known as:  ALLEGRA   Quantity:  30 tablet   Refills:  2   Dose:  180 mg    Instructions:  Take 1 tablet (180 mg total) by mouth once daily.     Begin Date    AM    Noon    PM    Bedtime       furosemide 20 MG tablet   Commonly known as:  LASIX   Quantity:  180 tablet   Refills:  11    Instructions:  TAKE 1 TABLET TWICE DAILY     Begin Date    AM    Noon    PM    Bedtime       hydrALAZINE 10 MG tablet   Commonly known as:  APRESOLINE   Quantity:  360 tablet   Refills:  3    Instructions:  TAKE 2 TABLETS EVERY 12 HOURS     Begin Date    AM    Noon    PM    Bedtime       hydroxychloroquine 200 mg tablet   Commonly known as:  PLAQUENIL   Quantity:  180 tablet   Refills:  1    Instructions:  TAKE 1 TABLET TWICE DAILY     Begin Date    AM    Noon    PM    Bedtime       hyoscyamine 0.125 mg Subl   Commonly known as:  LEVSIN/SL   Quantity:  60 tablet   Refills:  1   Dose:  0.125 mg    Instructions:  Place 1 tablet (0.125 mg total) under the tongue 2 (two) times daily as needed.     Begin Date    AM    Noon    PM    Bedtime       ipratropium 0.03 % nasal spray   Commonly known as:  ATROVENT   Quantity:  30 mL   Refills:  0   Dose:  2 spray    Instructions:  2 sprays by Nasal route 2 (two) times daily as needed for Rhinitis.     Begin Date    AM    Noon    PM    Bedtime       latanoprost 0.005 % ophthalmic solution   Quantity:  3 Bottle   Refills:  3    Instructions:  INSTILL 1 DROP INTO BOTH EYES EVERY DAY     Begin Date    AM    Noon    PM    Bedtime       levothyroxine 50 MCG tablet   Commonly known as:  SYNTHROID    Quantity:  90 tablet   Refills:  3    Instructions:  TAKE 1 TABLET BEFORE BREAKFAST DAILY     Begin Date    AM    Noon    PM    Bedtime       meclizine 12.5 mg tablet   Commonly known as:  ANTIVERT   Quantity:  30 tablet   Refills:  1   Dose:  12.5 mg    Instructions:  Take 1 tablet (12.5 mg total) by mouth 3 (three) times daily as needed.     Begin Date    AM    Noon    PM    Bedtime       mometasone 0.1 % ointment   Commonly known as:  ELOCON   Quantity:  45 g   Refills:  3    Instructions:  Apply topically once daily.     Begin Date    AM    Noon    PM    Bedtime       multivitamin capsule   Refills:  0   Dose:  1 capsule    Instructions:  Take 1 capsule by mouth once daily.     Begin Date    AM    Noon    PM    Bedtime       nitroGLYCERIN 0.4 MG SL tablet   Commonly known as:  NITROSTAT   Quantity:  25 tablet   Refills:  6    Instructions:  PLACE 1 TABLET UNDER THE TONGUE EVERY 5 MINUTES FOR 3 DOSES --IF NOT RELIEVED GO TO THE ER     Begin Date    AM    Noon    PM    Bedtime       nystatin-triamcinolone cream   Commonly known as:  MYCOLOG II   Quantity:  60 g   Refills:  0    Instructions:  Apply topically 4 (four) times daily.     Begin Date    AM    Noon    PM    Bedtime       omeprazole 20 MG capsule   Commonly known as:  PRILOSEC   Refills:  0   Dose:  20 mg    Instructions:  Take 20 mg by mouth as needed.     Begin Date    AM    Noon    PM    Bedtime       rosuvastatin 40 MG Tab   Commonly known as:  CRESTOR   Quantity:  90 tablet   Refills:  3   Dose:  40 mg    Instructions:  Take 1 tablet (40 mg total) by mouth every evening.     Begin Date    AM    Noon    PM    Bedtime       TENS units Sravanthi   Quantity:  1 Device   Refills:  0   Dose:  1 application    Instructions:  1 application by Misc.(Non-Drug; Combo Route) route daily as needed (pain).     Begin Date    AM    Noon    PM    Bedtime         ASK your doctor about these medications        Additional Info                      CHOLESTYRAMINE LIGHT 4  gram Pwpk   Refills:  0   Generic drug:  cholestyramine-aspartame    Instructions:  Take by mouth.     Begin Date    AM    Noon    PM    Bedtime       ciprofloxacin HCl 0.3 % ophthalmic solution   Commonly known as:  CILOXAN   Quantity:  5 mL   Refills:  0    Instructions:  INSTILL 2 DROPS IN EACH EAR THREE TIMES DAILY.     Begin Date    AM    Noon    PM    Bedtime       gentamicin-prednisoLONE 0.3-1 % ophthalmic drops   Commonly known as:  PRED G   Quantity:  3 mL   Refills:  0    Instructions:  2 drops to the affected ear 3 times a day for 5 days. Please let patient know that ophthalmic drops can be used in the ear.     Begin Date    AM    Noon    PM    Bedtime                  Please bring to all follow up appointments:    1. A copy of your discharge instructions.  2. All medicines you are currently taking in their original bottles.  3. Identification and insurance card.    Please arrive 15 minutes ahead of scheduled appointment time.    Please call 24 hours in advance if you must reschedule your appointment and/or time.        Your Scheduled Appointments     Mar 23, 2017  8:15 AM CDT   [facility]    [address] [place] [address] [address]  [facility] [address]   [phone]            Mar 31, 2017 10:00 AM CDT   Physical with Kalie Walton MD   [facility] ([facility])    [address], [address]  [facility] [address]   [phone]            Apr 21, 2017 10:15 AM CDT   Non-Fasting Lab [facility]    [address], [address]  [facility] [address]   [phone]            Apr 21, 2017 10:30 AM CDT   Urine with SPECIMEN[facility]    [address], [address]  [facility] [address]   [phone]            Apr 25, 2017 10:00 AM CDT   Established Patient Visit with Maciel Ruffin MD    [facility] )    [address]  [facility] [address] [phone]              Follow-up Information     Follow up with Kalie Walton MD.    Specialty:  Family Medicine    Why:  As needed    Contact information:    [address] [facility]  [address]  [facility] [address] [phone]          Follow up with GERALDINE Hayes In 4 weeks.    Specialty:  Gastroenterology    Contact information:    [address]  [place] [id number] [phone]          Follow up with Yogesh Fernandes MD.    Specialty:  Gastroenterology    Why:  As needed, Office will call with biopsy / pathology report    Contact information:    [address]  [address]  [place] [address] [phone]          Discharge Instructions     Future Orders    Diet general     Questions:    Total calories:      Fat restriction, if any:      Protein restriction, if any:      Na restriction, if any:      Fluid restriction:      Additional restrictions:          Discharge Instructions       Post EGD Discharge Instruction    Ewelina Arnold  3/16/2017  Yogesh Velasquez*    RESTRICTIONS ON ACTIVITY:    -DO NOT drive a car or operate machinery until the day after procedure.  -Following Day: Return to full activities including work.  -Diet: Eat and drink normally unless instructed otherwise.    TREATMENT FOR COMMON SIDE EFFECTS:  *Sore Throat - treat with throat lozenges, gargle with warm salt water.  *Mild abdominal pain & bloating- rest and take liquids only.    SYMPTOMS TO WATCH FOR AND REPORT TO YOUR PHYSICIAN:  1. Chills or fever occurring 24 hours after procedure.  2. Pain in chest.  3. SEVERE abdominal pain or bloating.  4. Rectal bleeding which could be maroon or black.    If you have any questions or problems, please call your Physician:    Yogesh Velasquez* Phone: ***    Lab Results: [phone]    If a complication or emergency situation arises and you are  "unable to reach your Physician - GO TO THE EMERGENCY ROOM.        Primary Diagnosis     Your primary diagnosis was:  Abnormal Weight Loss      Admission Information     Date & Time Provider Department CSN    3/16/2017  8:53 AM Yogesh Fernandes MD [facility] [id number]      Care Providers     Provider Role Specialty Primary office phone    Yogesh Fernandes MD Attending Provider Gastroenterology [phone]    Yogesh Fernandes MD Surgeon  Gastroenterology [phone]      Your Vitals Were     BP Pulse Temp Resp Height Weight    135/76 (BP Location: Right arm, Patient Position: Lying, BP Method: Automatic) 72 98 °F (36.7 °C) 16 5' 5" (1.651 m) 59 kg (130 lb)    SpO2 BMI             100% 21.63 kg/m2         Recent Lab Values        12/8/2004 1/13/2011 6/28/2011 1/23/2012 1/29/2013 6/17/2015 1/24/2017         7:50 AM  7:28 AM  9:29 AM  8:26 AM  8:45 AM 11:27 AM  9:45 AM     A1C 5.5 5.9 5.9 5.9 5.9 5.8 5.7     Comment for A1C at  9:45 AM on 1/24/2017:  According to ADA guidelines, hemoglobin A1C <7.0% represents  optimal control in non-pregnant diabetic patients.  Different  metrics may apply to specific populations.   Standards of Medical Care in Diabetes - 2016.  For the purpose of screening for the presence of diabetes:  <5.7%     Consistent with the absence of diabetes  5.7-6.4%  Consistent with increasing risk for diabetes   (prediabetes)  >or=6.5%  Consistent with diabetes  Currently no consensus exists for use of hemoglobin A1C  for diagnosis of diabetes for children.        Pending Labs     Order Current Status    Specimen to Pathology - Surgery Collected (03/16/17 [id number])      Allergies as of 3/16/2017        Reactions    Ace Inhibitors     Other reaction(s): Lips Swelling    Arthrotec 50  [Diclofenac-misoprostol]     Other reaction(s): Unknown    Bactrim [Sulfamethoxazole-trimethoprim]     Pt would like this medication to be added due to elevation of creatinine " with single kidney.    Bentyl [Dicyclomine]     Not effective    Doxycycline     nausea    Imdur [Isosorbide Mononitrate] Nausea Only    Lomotil [Diphenoxylate-atropine]     Stomach cramps, pt vomitted    Tramadol Nausea Only    Vioxx [Rofecoxib]     Other reaction(s): lips swelling    Lozol [Indapamide] Rash      [facility]     [facility] Nurse Care Line - 24/7 Assistance  Unless otherwise directed by your provider, please contact [facility], our nurse care line that is available for 24/7 assistance.     Registered nurses in the [facility] provide clinical advisement, health education, appointment booking, and other advisory services.  Call for this free service at [phone].        Advance Directives     An advance directive is a document which, in the event you are no longer able to make decisions for yourself, tells your healthcare team what kind of treatment you do or do not want to receive, or who you would like to make those decisions for you.  If you do not currently have an advance directive, [facility] encourages you to create one.  For more information call:  [phone]), [phone] ([phone]),  or log on to [email or web address].        Language Assistance Services     ATTENTION: Language assistance services are available, free of charge. Please call [phone].      ATENCIÓN: Si habla español, tiene a mera disposición servicios gratuitos de asistencia lingüística. Llame al [phone].     [facility] Ý: N?u b?n nói Ti?ng Vi?t, có các d?ch v? h? tr? ngôn ng? mi?n phí dành cho b?n. G?i s? [phone].        Heart Failure Education       Heart Failure: Being Active  You have a condition called heart failure. Being active doesnt mean that you have to wear yourself out. Even a little movement each day helps to strengthen your heart. If you cant get out to exercise, you can do simple stretching and strengthening exercises at home. These are good ways  to keep you well-conditioned and prevent you and your heart from becoming excessively weak.    Ideas to get you started  · Add a little movement to things you do now. Walk to mail letters. Park your car at the far end of the parking lot and walk to the store. Walk up a flight of stairs instead of taking the elevator.  · Choose activities you enjoy. You might walk, swim, or ride an exercise bike. Things like gardening and washing the car count, too. Other possibilities include: washing dishes, walking the dog, walking around the mall, and doing aerobic activities with friends.  · Join a group exercise program at a [facility] or [facility], a senior center, or a community center. Or look into a hospital cardiac rehabilitation program. Ask your doctor if you qualify.  Tips to keep you going  · Get up and get dressed each day. Go to a coffee shop and read a newspaper or go somewhere that you'll be in the presence of other active people. Youll feel more like being active.  · Make a plan. Choose one or more activities that you enjoy and that you can easily do. Then plan to do at least one each day. You might write your plan on a calendar.  · Go with a friend or a group if you like company. This can help you feel supported and stay motivated, too.  · Plan social events that you enjoy. This will keep you mentally engaged as well as physically motivated to do things you find pleasure in.  For your safety  · Talk with your healthcare provider before starting an exercise program.  · Exercise indoors when its too hot or too cold outside, or when the air quality is poor. Try walking at a shopping mall.  · Wear socks and sturdy shoes to maintain your balance and prevent falls.  · Start slowly. Do a few minutes several times a day at first. Increase your time and speed little by little.  · Stop and rest whenever you feel tired or get short of breath.  · Dont push yourself on days when you dont feel well.  Date Last Reviewed: 3/20/2016  ©  9787-7980 [employer]. [address] [place] [address], [place] [address]. All rights reserved. This information is not intended as a substitute for professional medical care. Always follow your healthcare professional's instructions.              Heart Failure: Evaluating Your Heart  You have a condition called heart failure. To evaluate your condition, your doctor will examine you, ask questions, and do some tests. Along with looking for signs of heart failure, the doctor looks for any other health problems that may have led to heart failure. The results of your evaluation will help your doctor form a treatment plan.  Health history and physical exam  Your visit will start with a health history. Tell the doctor about any symptoms youve noticed and about all medicines you take. Then youll have a physical exam. This includes listening to your heartbeat and breathing. Youll also be checked for swelling (edema) in your legs and neck. When you have fluid buildup or fluid in the lungs, it may be called congestive heart failure.  Diagnosing heart failure     During an echocardiogram, sound waves bounce off the heart. These are converted into a picture on the screen.   The following may be done to help your doctor form a diagnosis:  · X-rays show the size and shape of your heart. These pictures can also show fluid in your lungs.  · An electrocardiogram (ECG or EKG) shows the pattern of your heartbeat. Small pads (electrodes) are placed on your chest, arms, and legs. Wires connect the pads to the ECG machine, which records your hearts electrical signals. This can give the doctor information about heart function.  · An echocardiogram uses ultrasound waves to show the structure and movement of your heart muscle. This shows how well the heart pumps. It also shows the thickness of the heart walls, and if the heart is enlarged. It is one of the most useful, non-invasive tests as it provides information about the  heart's general function. This helps your doctor make treatment decisions.  · Lab tests evaluate small amounts of blood or urine for signs of problems. A BNP lab test can help diagnose and evaluate heart failure. BNP stands for B-type natriuretic peptide. The ventricles secrete more BNP when heart failure worsens. Lab tests can also provide information about metabolic dysfunction or heart dysfunction.  Your treatment plan  Based on the results of your evaluation and tests, your doctor will develop a treatment plan. This plan is designed to relieve some of your heart failure symptoms and help make you more comfortable. Your treatment plan may include:  · Medicine to help your heart work better and improve your quality of life  · Changes in what you eat and drink to help prevent fluid from backing up in your body  · Daily monitoring of your weight and heart failure symptoms to see how well your treatment plan is working  · Exercise to help you stay healthy  · Help with quitting smoking  · Emotional and psychological support to help adjust to the changes  · Referrals to other specialists to make sure you are being treated comprehensively  Date Last Reviewed: 3/21/2016  © 1373-1613 [employer]. [address] [place] [address]. All rights reserved. This information is not intended as a substitute for professional medical care. Always follow your healthcare professional's instructions.              Heart Failure: Making Changes to Your Diet  You have a condition called heart failure. When you have heart failure, excess fluid is more likely to build up in your body because your heart isn't working well. This makes the heart work harder to pump blood. Fluid buildup causes symptoms such as shortness of breath and swelling (edema). This is often referred to as congestive heart failure or CHF. Controlling the amount of salt (sodium) you eat may help stop fluid from building up. Your doctor may also tell  you to reduce the amount of fluid you drink.  Reading food labels    Your healthcare provider will tell you how much sodium you can eat each day. Read food labels to keep track. Keep in mind that certain foods are high in salt. These include canned, frozen, and processed foods. Check the amount of sodium in each serving. Watch out for high-sodium ingredients. These include MSG (monosodium glutamate), baking soda, and sodium phosphate.   Eating less salt  Give yourself time to get used to eating less salt. It may take a little while. Here are some tips to help:  · Take the saltshaker off the table. Replace it with salt-free herb mixes and spices.  · Eat fresh or plain frozen vegetables. These have much less salt than canned vegetables.  · Choose low-sodium snacks like sodium-free pretzels, crackers, or air-popped popcorn.  · Dont add salt to your food when youre cooking. Instead, season your foods with pepper, lemon, garlic, or onion.  · When you eat out, ask that your food be cooked without added salt.  · Avoid eating fried foods as these often have a great deal of salt.  If youre told to limit fluids  You may need to limit how much fluid you have to help prevent swelling. This includes anything that is liquid at room temperature, such as ice cream and soup. If your doctor tells you to limit fluid, try these tips:  · Measure drinks in a measuring cup before you drink them. This will help you meet daily goals.  · Chill drinks to make them more refreshing.  · Suck on frozen lemon wedges to quench thirst.  · Only drink when youre thirsty.  · Chew sugarless gum or suck on hard candy to keep your mouth moist.  · Weigh yourself daily to know if your body's fluid content is rising.  My sodium goal  Your healthcare provider may give you a sodium goal to meet each day. This includes sodium found in food as well as salt that you add. My goal is to eat no more than ___________ mg of sodium per day.     When to call your  doctor  Call your doctor right away if you have any symptoms of worsening heart failure. These can include:  · Sudden weight gain  · Increased swelling of your legs or ankles  · Trouble breathing when youre resting or at night  · Increase in the number of pillows you have to sleep on  · Chest pain, pressure, discomfort, or pain in the jaw, neck, or back   Date Last Reviewed: 3/21/2016  © 5795-6456 [employer]. [address] [place] [address], [place] [address]. All rights reserved. This information is not intended as a substitute for professional medical care. Always follow your healthcare professional's instructions.              Heart Failure: Medicines to Help Your Heart    You have a condition called heart failure (also known as congestive heart failure, or CHF). Your doctor will likely prescribe medicines for heart failure and any underlying health problems you have. Most heart failure patients take one or more types of medicinen. Your healthcare provider will work to find the combination of medicines that works best for you.  Heart failure medicines  Here are the most common heart failure medicines:  · ACE inhibitors lower blood pressure and decrease strain on the heart. This makes it easier for the heart to pump. Angiotensin receptor blockers have similar effects. These are prescribed for some patients instead of ACE inhibitors.  · Beta-blockers relieve stress on the heart. They also improve symptoms. They may also improve the heart's pumping action over time.  · Diuretics (also called water pills) help rid your body of excess water. This can help rid your body of swelling (edema). Having less fluid to pump means your heart doesnt have to work as hard. Some diuretics make your body lose a mineral called potassium. Your doctor will tell you if you need to take supplements or eat more foods high in potassium.  · Digoxin helps your heart pump with more strength. This helps your heart pump more blood  with each beat. So, more oxygen-rich blood travels to the rest of the body.  · Aldosterone antagonists help alter hormones and decrease strain on the heart.  · Hydralazine and nitrates are two separate medicines used together to treat heart failure. They may come in one combination pill. They lower blood pressure and decrease how hard the heart has to pump.  Medicines for related conditions  Controlling other heart problems helps keep heart failure under control, too. Depending on other heart problems you have, medicines may be prescribed to:  · Lower blood pressure (antihypertensives).  · Lower cholesterol levels (statins).  · Prevent blood clots (anticoagulants or aspirin).  · Keep the heartbeat steady (antiarrhythmics).  Date Last Reviewed: 3/5/2016  © 1509-1931 [employer]. [address] [place] [address], [place] [address]. All rights reserved. This information is not intended as a substitute for professional medical care. Always follow your healthcare professional's instructions.              Heart Failure: Procedures That May Help    The heart is a muscle that pumps oxygen-rich blood to all parts of the body. When you have heart failure, the heart is not able to pump as well as it should. Blood and fluid may back up into the lungs (congestive heart failure), and some parts of the body dont get enough oxygen-rich blood to work normally. These problems lead to the symptoms of heart failure.     Certain procedures may help the heart pump better in some cases of heart failure. Some procedures are done to treat health problems that may have caused the heart failure such as coronary artery disease or heart rhythm problems. For more serious heart failure, other options are available.  Treating artery and valve problems  If you have coronary artery disease or valve disease, procedures may be done to improve blood flow. This helps the heart pump better, which can improve heart failure symptoms. First, your  doctor may do a cardiac catheterization to help detect clogged blood vessels or valve damage. During this procedure, a  thin tube (catheter) in inserted into a blood vessel and guided to the heart. There a dye is injected and a special type of X-ray (angiogram) is taken of the blood vessels. Procedures to open a blocked artery or fix damaged valves can also be done using catheterization.  · Angioplasty uses a balloon-tipped instrument at the end of the catheter. The balloon is inflated to widen the narrowed artery. In many cases, a stent is expanded to further support the narrowed artery. A stent is a metal mesh tube.  · Valve surgery repairs or replacement of faulty valves can also be done during catheterization so blood can flow properly through the chambers of the heart.  Bypass surgery is another option to help treat blocked arteries. It uses a healthy blood vessel from elsewhere in the body. The healthy blood vessel is attached above and below the blocked area so that blood can flow around the blocked artery.  Treating heart rhythm problems  A device may be placed in the chest to help a weak heart maintain a healthy, heartbeat so the heart can pump more effectively:  · Pacemaker. A pacemaker is an implanted device that regulates your heartbeat electronically. It monitors your heart's rhythm and generates a painless electric impulse that helps the heart beat in a regular rhythm. A pacemaker is programmed to meet your specific heart rhythm needs.  · Biventricular pacing/cardiac resynchronization therapy. A type of pacemaker that paces both pumping chambers of the heart at the same time to coordinate contractions and to improve the heart's function. Some people with heart failure are candidates for this therapy.  · Implantable cardioverter defibrillator. A device similar to a pacemaker that senses when the heart is beating too fast and delivers an electrical shock to convert the fast rhythm to a normal rhythm.  This can be a life saving device.  In severe cases  In more serious cases of heart failure when other treatments no longer work, other options may include:  · Ventricular assist devices (VADs). These are mechanical devices used to take over the pumping function for one or both of the heart's ventricles, or pumping chambers. A VAD may be necessary when heart failure progresses to the point that medicines and other treatments no longer help. In some cases, a VAD may be used as a bridge to transplant.  · Heart transplant. This is replacing the diseased heart with a healthy one from a donor. This is an option for a few people who are very sick. A heart transplant is very serious and not an option for all patients. Your doctor can tell you more.  Date Last Reviewed: 3/20/2016  © 6189-8341 [employer]. [address] [place] [address], [place] [address]. All rights reserved. This information is not intended as a substitute for professional medical care. Always follow your healthcare professional's instructions.              Heart Failure: Tracking Your Weight  You have a condition called heart failure. When you have heart failure, a sudden weight gain or a steady rise in weight is a warning sign that your body is retaining too much water and salt. This could mean your heart failure is getting worse. If left untreated, it can cause problems for your lungs and result in shortness of breath. Weighing yourself each day is the best way to know if youre retaining water. If your weight goes up quickly, call your doctor. You will be given instructions on how to get rid of the excess water. You will likely need medicines and to avoid salt. This will help your heart work better.  Call your doctor if you gain more than 2 pounds in 1 day, more than 5 pounds in 1 week, or whatever weight gain you were told to report by your doctor. This is often a sign of worsening heart failure and needs to be evaluated and treated. Your doctor  will tell you what to do next.   Tips for weighing yourself    · Weigh yourself at the same time each morning, wearing the same clothes. Weigh yourself after urinating and before eating.  · Use the same scale each day. Make sure the numbers are easy to read. Put the scale on a flat, hard surface -- not on a rug or carpet.  · Do not stop weighing yourself. If you forget one day, weigh again the next morning.  How to use your weight chart  · Keep your weight chart near the scale. Write your weight on the chart as soon as you get off the scale.  · Fill in the month and the start date on the chart. Then write down your weight each day. Your chart will look like this:    · If you miss a day, leave the space blank. Weigh yourself the next day and write your weight in the next space.  · Take your weight chart with you when you go to see your doctor.  Date Last Reviewed: 3/20/2016  © 3192-2779 [employer]. [address] [place] [address]. All rights reserved. This information is not intended as a substitute for professional medical care. Always follow your healthcare professional's instructions.              Heart Failure: Warning Signs of a Flare-Up  You have a condition called heart failure. Once you have heart failure, flare-ups can happen. Below are signs that can mean your heart failure is getting worse. If you notice any of these warning signs, call your healthcare provider.  Swelling    · Your feet, ankles, or lower legs get puffier.  · You notice skin changes on your lower legs.  · Your shoes feel too tight.  · Your clothes are tighter in the waist.  · You have trouble getting rings on or off your fingers.  Shortness of breath  · You have to breathe harder even when youre doing your normal activities or when youre resting.  · You are short of breath walking up stairs or even short distances.  · You wake up at night short of breath or coughing.  · You need to use more pillows or sit up to  sleep.  · You wake up tired or restless.  Other warning signs  · You feel weaker, dizzy, or more tired.  · You have chest pain or changes in your heartbeat.  · You have a cough that wont go away.  · You cant remember things or dont feel like eating.  Tracking your weight  Gaining weight is often the first warning sign that heart failure is getting worse. Gaining even a few pounds can be a sign that your body is retaining excess water and salt. Weighing yourself each day in the morning after you urinate and before you eat, is the best way to know if you're retaining water. Get a scale that is easy to read and make sure you wear the same clothes and use the same scale every time you weigh. Your healthcare provider will show you how to track your weight. Call your doctor if you gain more than 2 pounds in 1 day, 5 pounds in 1 week, or whatever weight gain you were told to report by your doctor. This is often a sign of worsening heart failure and needs to be evaluated and treated before it compromises your breathing. Your doctor will tell you what to do next.    Date Last Reviewed: 3/15/2016  © 5756-1722 [employer]. [address] [place] [address], [place] [address]. All rights reserved. This information is not intended as a substitute for professional medical care. Always follow your healthcare professional's instructions.              Chronic Kindey Disease Education              [facility] complies with applicable Federal civil rights laws and does not discriminate on the basis of race, color, national origin, age, disability, or sex.

## 2017-03-16 NOTE — ANESTHESIA POSTPROCEDURE EVALUATION
"Anesthesia Post Evaluation    Patient: Ewelina Arnold    Procedure(s) Performed: Procedure(s) (LRB):  ESOPHAGOGASTRODUODENOSCOPY (EGD) (N/A)    Final Anesthesia Type: MAC  Patient location during evaluation: GI PACU  Patient participation: Yes- Able to Participate  Level of consciousness: awake and alert  Post-procedure vital signs: reviewed and stable  Pain management: adequate  Airway patency: patent  PONV status at discharge: No PONV  Anesthetic complications: no      Cardiovascular status: blood pressure returned to baseline and hemodynamically stable  Respiratory status: unassisted, spontaneous ventilation and room air  Hydration status: euvolemic  Follow-up not needed.        Visit Vitals    BP (!) 170/76    Pulse 63    Temp 36.8 °C (98.2 °F)    Resp 18    Ht 5' 5" (1.651 m)    Wt 59 kg (130 lb)    SpO2 100%    Breastfeeding No    BMI 21.63 kg/m2       Pain/Juan Francisco Score: Pain Assessment Performed: Yes (3/16/2017 10:05 AM)  Presence of Pain: denies (3/16/2017 10:05 AM)      "

## 2017-03-16 NOTE — DISCHARGE INSTRUCTIONS
Post EGD Discharge Instruction    Ewelina Arnold  3/16/2017  Yogesh Velasquez*    RESTRICTIONS ON ACTIVITY:    -DO NOT drive a car or operate machinery until the day after procedure.  -Following Day: Return to full activities including work.  -Diet: Eat and drink normally unless instructed otherwise.    TREATMENT FOR COMMON SIDE EFFECTS:  *Sore Throat - treat with throat lozenges, gargle with warm salt water.  *Mild abdominal pain & bloating- rest and take liquids only.    SYMPTOMS TO WATCH FOR AND REPORT TO YOUR PHYSICIAN:  1. Chills or fever occurring 24 hours after procedure.  2. Pain in chest.  3. SEVERE abdominal pain or bloating.  4. Rectal bleeding which could be maroon or black.    If you have any questions or problems, please call your Physician:    Yogesh Velasquez* Phone: ***    Lab Results: [phone]    If a complication or emergency situation arises and you are unable to reach your Physician - GO TO THE EMERGENCY ROOM.

## 2017-03-17 ENCOUNTER — PATIENT MESSAGE (OUTPATIENT)
Dept: GASTROENTEROLOGY | Facility: CLINIC | Age: 72
End: 2017-03-17

## 2017-03-17 ENCOUNTER — TELEPHONE (OUTPATIENT)
Dept: GASTROENTEROLOGY | Facility: CLINIC | Age: 72
End: 2017-03-17

## 2017-03-17 VITALS
OXYGEN SATURATION: 100 % | HEIGHT: 65 IN | TEMPERATURE: 98 F | WEIGHT: 130 LBS | DIASTOLIC BLOOD PRESSURE: 76 MMHG | HEART RATE: 72 BPM | SYSTOLIC BLOOD PRESSURE: 135 MMHG | RESPIRATION RATE: 16 BRPM | BODY MASS INDEX: 21.66 KG/M2

## 2017-03-17 PROBLEM — R63.0 DECREASED APPETITE: Status: ACTIVE | Noted: 2017-03-17

## 2017-03-17 RX ORDER — OMEPRAZOLE 40 MG/1
40 CAPSULE, DELAYED RELEASE ORAL DAILY
Qty: 30 CAPSULE | Refills: 3 | Status: SHIPPED | OUTPATIENT
Start: 2017-03-17 | End: 2017-05-09 | Stop reason: SDUPTHER

## 2017-03-17 NOTE — TELEPHONE ENCOUNTER
Reviewed EGD results with the patient.  Will add omeprazole which she has not been taking.  I have sent this prescription to the pharmacy.,

## 2017-03-17 NOTE — TELEPHONE ENCOUNTER
----- Message from Avani Cortes MA sent at 3/17/2017 11:52 AM CDT -----  Contact: [phone]/ self       ----- Message -----     From: Niki Esparza     Sent: 3/17/2017  11:47 AM       To: Noel Laureano    Pt its requesting to speak with you in regarding  her test results done yesterday . Please advise

## 2017-03-23 ENCOUNTER — PATIENT MESSAGE (OUTPATIENT)
Dept: FAMILY MEDICINE | Facility: CLINIC | Age: 72
End: 2017-03-23

## 2017-03-23 ENCOUNTER — LAB VISIT (OUTPATIENT)
Dept: LAB | Facility: HOSPITAL | Age: 72
End: 2017-03-23
Attending: FAMILY MEDICINE
Payer: MEDICARE

## 2017-03-23 ENCOUNTER — TELEPHONE (OUTPATIENT)
Dept: FAMILY MEDICINE | Facility: CLINIC | Age: 72
End: 2017-03-23

## 2017-03-23 DIAGNOSIS — I77.9 LEFT-SIDED CAROTID ARTERY DISEASE: ICD-10-CM

## 2017-03-23 DIAGNOSIS — I10 ESSENTIAL HYPERTENSION: ICD-10-CM

## 2017-03-23 DIAGNOSIS — I27.20 PULMONARY HYPERTENSION: ICD-10-CM

## 2017-03-23 DIAGNOSIS — Z00.00 ROUTINE GENERAL MEDICAL EXAMINATION AT A HEALTH CARE FACILITY: Primary | ICD-10-CM

## 2017-03-23 DIAGNOSIS — Z00.00 ROUTINE GENERAL MEDICAL EXAMINATION AT A HEALTH CARE FACILITY: ICD-10-CM

## 2017-03-23 LAB
ALBUMIN SERPL BCP-MCNC: 3 G/DL
ALP SERPL-CCNC: 65 U/L
ALT SERPL W/O P-5'-P-CCNC: 18 U/L
ANION GAP SERPL CALC-SCNC: 10 MMOL/L
AST SERPL-CCNC: 34 U/L
BASOPHILS # BLD AUTO: 0.01 K/UL
BASOPHILS NFR BLD: 0.3 %
BILIRUB SERPL-MCNC: 0.3 MG/DL
BUN SERPL-MCNC: 19 MG/DL
CALCIUM SERPL-MCNC: 8.9 MG/DL
CHLORIDE SERPL-SCNC: 113 MMOL/L
CHOLEST/HDLC SERPL: 3.2 {RATIO}
CO2 SERPL-SCNC: 19 MMOL/L
CREAT SERPL-MCNC: 1.4 MG/DL
DIFFERENTIAL METHOD: ABNORMAL
EOSINOPHIL # BLD AUTO: 0.2 K/UL
EOSINOPHIL NFR BLD: 4 %
ERYTHROCYTE [DISTWIDTH] IN BLOOD BY AUTOMATED COUNT: 12.8 %
EST. GFR  (AFRICAN AMERICAN): 43.6 ML/MIN/1.73 M^2
EST. GFR  (NON AFRICAN AMERICAN): 37.8 ML/MIN/1.73 M^2
GLUCOSE SERPL-MCNC: 72 MG/DL
HCT VFR BLD AUTO: 30.8 %
HDL/CHOLESTEROL RATIO: 31.7 %
HDLC SERPL-MCNC: 123 MG/DL
HDLC SERPL-MCNC: 39 MG/DL
HGB BLD-MCNC: 9.9 G/DL
LDLC SERPL CALC-MCNC: 68.2 MG/DL
LYMPHOCYTES # BLD AUTO: 1.2 K/UL
LYMPHOCYTES NFR BLD: 31.7 %
MCH RBC QN AUTO: 26.5 PG
MCHC RBC AUTO-ENTMCNC: 32.1 %
MCV RBC AUTO: 83 FL
MONOCYTES # BLD AUTO: 0.7 K/UL
MONOCYTES NFR BLD: 19.1 %
NEUTROPHILS # BLD AUTO: 1.7 K/UL
NEUTROPHILS NFR BLD: 44.6 %
NONHDLC SERPL-MCNC: 84 MG/DL
PLATELET # BLD AUTO: 209 K/UL
PMV BLD AUTO: 10 FL
POTASSIUM SERPL-SCNC: 4.5 MMOL/L
PROT SERPL-MCNC: 6.9 G/DL
RBC # BLD AUTO: 3.73 M/UL
SODIUM SERPL-SCNC: 142 MMOL/L
TRIGL SERPL-MCNC: 79 MG/DL
TSH SERPL DL<=0.005 MIU/L-ACNC: 0.94 UIU/ML
WBC # BLD AUTO: 3.72 K/UL

## 2017-03-23 PROCEDURE — 80061 LIPID PANEL: CPT

## 2017-03-23 PROCEDURE — 84443 ASSAY THYROID STIM HORMONE: CPT

## 2017-03-23 PROCEDURE — 85025 COMPLETE CBC W/AUTO DIFF WBC: CPT

## 2017-03-23 PROCEDURE — 36415 COLL VENOUS BLD VENIPUNCTURE: CPT | Mod: PO

## 2017-03-23 PROCEDURE — 80053 COMPREHEN METABOLIC PANEL: CPT

## 2017-03-23 NOTE — TELEPHONE ENCOUNTER
----- Message from Mahad Redd sent at 3/23/2017 12:59 PM CDT -----  Contact: Self [phone]  Pt called and stated, Mrs Holman of the Pharmacy pt assitant program is faxing you a medicine change request form for the Premarin I'm using asking for a dose change, advice    Thanks

## 2017-03-24 ENCOUNTER — PATIENT MESSAGE (OUTPATIENT)
Dept: FAMILY MEDICINE | Facility: CLINIC | Age: 72
End: 2017-03-24

## 2017-03-24 NOTE — TELEPHONE ENCOUNTER
Spoke with Anisa with the patient's assistance department states patient contacted her to get a refill on the premarin vaginal cream stating she's completely out of medication.  Anisa states she contacted the company to get a refill and was advised patient is to soon to refill not due for refill until April 23.  Anisa would like you to increase the dosage, so patient is able to get a refill.  Advised Anisa according to the original RX it states to use 1g nightly for two weeks then 1 g twice per week.  Please advise  Form placed on your desk for review,

## 2017-03-27 ENCOUNTER — PATIENT MESSAGE (OUTPATIENT)
Dept: GASTROENTEROLOGY | Facility: CLINIC | Age: 72
End: 2017-03-27

## 2017-03-28 ENCOUNTER — PATIENT MESSAGE (OUTPATIENT)
Dept: OBSTETRICS AND GYNECOLOGY | Facility: CLINIC | Age: 72
End: 2017-03-28

## 2017-03-31 ENCOUNTER — OFFICE VISIT (OUTPATIENT)
Dept: FAMILY MEDICINE | Facility: CLINIC | Age: 72
End: 2017-03-31
Payer: MEDICARE

## 2017-03-31 VITALS
BODY MASS INDEX: 21.86 KG/M2 | HEIGHT: 65 IN | WEIGHT: 131.19 LBS | HEART RATE: 60 BPM | SYSTOLIC BLOOD PRESSURE: 130 MMHG | TEMPERATURE: 98 F | DIASTOLIC BLOOD PRESSURE: 56 MMHG

## 2017-03-31 DIAGNOSIS — K12.1 MOUTH ULCER: ICD-10-CM

## 2017-03-31 DIAGNOSIS — D64.9 ANEMIA, UNSPECIFIED TYPE: ICD-10-CM

## 2017-03-31 DIAGNOSIS — R63.4 WEIGHT LOSS: Primary | ICD-10-CM

## 2017-03-31 DIAGNOSIS — I27.20 PULMONARY HYPERTENSION: ICD-10-CM

## 2017-03-31 DIAGNOSIS — N18.30 CHRONIC KIDNEY DISEASE, STAGE III (MODERATE): ICD-10-CM

## 2017-03-31 DIAGNOSIS — M32.9 SYSTEMIC LUPUS ERYTHEMATOSUS, UNSPECIFIED SLE TYPE, UNSPECIFIED ORGAN INVOLVEMENT STATUS: ICD-10-CM

## 2017-03-31 PROCEDURE — 99499 UNLISTED E&M SERVICE: CPT | Mod: S$GLB,,, | Performed by: FAMILY MEDICINE

## 2017-03-31 PROCEDURE — G0101 CA SCREEN;PELVIC/BREAST EXAM: HCPCS | Mod: S$GLB,,, | Performed by: FAMILY MEDICINE

## 2017-03-31 PROCEDURE — 99397 PER PM REEVAL EST PAT 65+ YR: CPT | Mod: 25,S$GLB,, | Performed by: FAMILY MEDICINE

## 2017-03-31 PROCEDURE — 3075F SYST BP GE 130 - 139MM HG: CPT | Mod: S$GLB,,, | Performed by: FAMILY MEDICINE

## 2017-03-31 PROCEDURE — 99999 PR PBB SHADOW E&M-EST. PATIENT-LVL III: CPT | Mod: PBBFAC,,, | Performed by: FAMILY MEDICINE

## 2017-03-31 PROCEDURE — 3078F DIAST BP <80 MM HG: CPT | Mod: S$GLB,,, | Performed by: FAMILY MEDICINE

## 2017-03-31 RX ORDER — TRIAMCINOLONE ACETONIDE 1 MG/G
PASTE DENTAL 2 TIMES DAILY
Qty: 5 G | Refills: 12 | Status: SHIPPED | OUTPATIENT
Start: 2017-03-31 | End: 2017-04-30

## 2017-03-31 NOTE — MR AVS SNAPSHOT
[facility]  [address] [facility], [address]  [facility] [address]  Phone: [phone]  Fax: [phone]                  Ewelina Arnold   3/31/2017 10:00 AM   Office Visit    Description:  Female : 1945   Provider:  Kalie Walton MD   Department:  [facility]           Reason for Visit     Annual Exam           Diagnoses this Visit        Comments    Weight loss    -  Primary     Anemia, unspecified type         Mouth ulcer         Chronic kidney disease, stage III (moderate)         Systemic lupus erythematosus, unspecified SLE type, unspecified organ involvement status                To Do List           Future Appointments        Provider Department Dept Phone    2017 10:15 AM LAB, [place] [facility] [phone]    2017 10:30 AM SPECIMEN, [place] [facility] [phone]    2017 10:00 AM MD Markus Ch [facility] - Nephrology [phone]    5/3/2017 1:00 PM Marky Mccray MD [facility] - Ophthalmology [phone]    2017 10:30 AM Elham Wyman MD [facility] - Cardiology [phone]      Goals (5 Years of Data)     None       These Medications        Disp Refills Start End    triamcinolone acetonide 0.1% (KENALOG) 0.1 % paste 5 g 12 3/31/2017 2017    Place onto teeth 2 (two) times daily. - Dental    Pharmacy: [facility] [id number] [place], [place] [address] AT [facility] [address] Ph #: [phone]         [facility]     [facility] - 24/ Assistance  Unless otherwise directed by your provider, please contact [facility], our nurse care line that is available for 24/7 assistance.     Registered nurses in the [facility] provide: appointment scheduling, clinical advisement, health education, and other advisory services.  Call: [phone] (toll free)               Medications           Message  regarding Medications     Verify the changes and/or additions to your medication regime listed below are the same as discussed with your clinician today.  If any of these changes or additions are incorrect, please notify your healthcare provider.        START taking these NEW medications        Refills    triamcinolone acetonide 0.1% (KENALOG) 0.1 % paste 12    Sig: Place onto teeth 2 (two) times daily.    Class: Normal    Route: Dental      STOP taking these medications     ciprofloxacin HCl (CILOXAN) 0.3 % ophthalmic solution INSTILL 2 DROPS IN EACH EAR THREE TIMES DAILY.           Verify that the below list of medications is an accurate representation of the medications you are currently taking.  If none reported, the list may be blank. If incorrect, please contact your healthcare provider. Carry this list with you in case of emergency.           Current Medications     amlodipine (NORVASC) 10 MG tablet TAKE 1 TABLET EVERY DAY    ammonium lactate 12 % Crea Apply 1 application topically 2 (two) times daily as needed.    butalbital-aspirin-caffeine -40 mg (FIORINAL) -40 mg Cap Take 1 capsule by mouth.    carvedilol (COREG) 12.5 MG tablet TAKE 1 TABLET TWICE DAILY WITH MEALS    cholestyramine-aspartame (CHOLESTYRAMINE LIGHT) 4 gram PwPk Take by mouth.    citalopram (CELEXA) 10 MG tablet TAKE 1 TABLET EVERY DAY    conjugated estrogens (PREMARIN) vaginal cream Use 1g nightly for two weeks, then 1g twice per week.    ergocalciferol (ERGOCALCIFEROL) 50,000 unit Cap Take 1 capsule (50,000 Units total) by mouth every 30 days.    ferrous gluconate (FERGON) 325 MG Tab Take 1 tablet (325 mg total) by mouth daily with breakfast.    furosemide (LASIX) 20 MG tablet TAKE 1 TABLET TWICE DAILY    gentamicin-prednisoLONE (PRED G) 0.3-1 % ophthalmic drops 2 drops to the affected ear 3 times a day for 5 days.  Please let patient know that ophthalmic drops can be used in the ear.    hydrALAZINE (APRESOLINE) 10 MG tablet  "TAKE 2 TABLETS EVERY 12 HOURS    hydroxychloroquine (PLAQUENIL) 200 mg tablet TAKE 1 TABLET TWICE DAILY    hyoscyamine (LEVSIN/SL) 0.125 mg Subl Place 1 tablet (0.125 mg total) under the tongue 2 (two) times daily as needed.    ipratropium (ATROVENT) 0.03 % nasal spray 2 sprays by Nasal route 2 (two) times daily as needed for Rhinitis.    latanoprost 0.005 % ophthalmic solution INSTILL 1 DROP INTO BOTH EYES EVERY DAY    levothyroxine (SYNTHROID) 50 MCG tablet TAKE 1 TABLET BEFORE BREAKFAST DAILY    meclizine (ANTIVERT) 12.5 mg tablet Take 1 tablet (12.5 mg total) by mouth 3 (three) times daily as needed.    multivitamin capsule Take 1 capsule by mouth once daily.    nitroGLYCERIN (NITROSTAT) 0.4 MG SL tablet PLACE 1 TABLET UNDER THE TONGUE EVERY 5 MINUTES FOR 3 DOSES --IF NOT RELIEVED GO TO THE ER    nystatin-triamcinolone (MYCOLOG II) cream Apply topically 4 (four) times daily.    omeprazole (PRILOSEC) 40 MG capsule Take 1 capsule (40 mg total) by mouth once daily.    rosuvastatin (CRESTOR) 40 MG Tab Take 1 tablet (40 mg total) by mouth every evening.    TENS units Sravanthi 1 application by Misc.(Non-Drug; Combo Route) route daily as needed (pain).    triamcinolone acetonide 0.1% (KENALOG) 0.1 % paste PLACE ONTO TEETH TWICE DAILY    aspirin (ECOTRIN) 81 MG EC tablet Take 1 tablet (81 mg total) by mouth once daily.    betamethasone dipropionate (DIPROLENE) 0.05 % cream Apply topically 2 (two) times daily as needed.    fexofenadine (ALLEGRA) 180 MG tablet Take 1 tablet (180 mg total) by mouth once daily.    mometasone (ELOCON) 0.1 % ointment Apply topically once daily.    triamcinolone acetonide 0.1% (KENALOG) 0.1 % paste Place onto teeth 2 (two) times daily.           Clinical Reference Information           Your Vitals Were     BP Pulse Temp Height Weight BMI    130/56 (BP Location: Right arm) 60 98.2 °F (36.8 °C) 5' 5" (1.651 m) 59.5 kg (131 lb 2.8 oz) 21.83 kg/m2      Blood Pressure          Most Recent Value    BP  " (!)  130/56      Allergies as of 3/31/2017     Ace Inhibitors    Arthrotec 50  [Diclofenac-misoprostol]    Bactrim [Sulfamethoxazole-trimethoprim]    Bentyl [Dicyclomine]    Doxycycline    Imdur [Isosorbide Mononitrate]    Lomotil [Diphenoxylate-atropine]    Tramadol    Vioxx [Rofecoxib]    Lozol [Indapamide]      Immunizations Administered on Date of Encounter - 3/31/2017     None      Orders Placed During Today's Visit      Normal Orders This Visit    Ambulatory referral to Nutrition Services     Ambulatory referral to Rheumatology       Language Assistance Services     ATTENTION: Language assistance services are available, free of charge. Please call [phone].      ATENCIÓN: Si femila johnathangeorgina, tiene a mera disposición servicios gratuitos de asistencia lingüística. Llame al [phone].     CHÚ Ý: N?u b?n nói Ti?ng Vi?t, có các d?ch v? h? tr? ngôn ng? mi?n phí dành cho b?n. G?i s? [phone].         [facility] complies with applicable Federal civil rights laws and does not discriminate on the basis of race, color, national origin, age, disability, or sex.

## 2017-04-02 NOTE — PROGRESS NOTES
Ewelina Arnold is a 71 y.o. female   Routine physical  Source of history: Patient  Past Medical History:   Diagnosis Date    Allergy     Anatomical narrow angle glaucoma     Anemia     States diagnosed about a month ago    Aortic atherosclerosis     Arthritis     Cataract     Chronic kidney disease     Chronic sciatica of left side     Right lower back pain due to sciatica    Coronary artery disease     Depression     Dry eyes     GERD (gastroesophageal reflux disease)     History of colon cancer     Hyperlipidemia     Hypertension     Hypothyroidism     Left ventricular diastolic dysfunction with preserved systolic function     Lupus (systemic lupus erythematosus) 8/17/2012    Osteoarthritis of cervical spine 8/17/2012    Osteopenia     PAD (peripheral artery disease)      Patient  reports that she quit smoking about 32 years ago. She has a 45.00 pack-year smoking history. She has never used smokeless tobacco. She reports that she does not drink alcohol or use illicit drugs.  Family History   Problem Relation Age of Onset    Heart attack Mother     Hypertension Mother     Heart disease Father     Hypertension Father     Diabetes Maternal Grandmother     Hypertension Sister     Kidney disease Brother     Kidney failure Brother      received donated kidney from sister    No Known Problems Daughter     Diabetes Son     Hypertension Brother     Acne Neg Hx     Eczema Neg Hx     Lupus Neg Hx     Psoriasis Neg Hx     Melanoma Neg Hx      ROS:  GENERAL: No fever, chills, fatigability or weight loss.  SKIN: No rashes, itching or changes in color or texture of skin.  HEAD: No headaches or recent head trauma.  EYES: Visual acuity fine. No photophobia, ocular pain or diplopia.  EARS: Denies ear pain, discharge or vertigo.  NOSE: No loss of smell, no epistaxis or postnasal drip.  MOUTH & THROAT: No hoarseness or change in voice. No excessive gum bleeding.  NODES: Denies swollen  glands.  CHEST: Denies PEDRO, cyanosis, wheezing, cough and sputum production.  CARDIOVASCULAR: Denies chest pain, PND, orthopnea or reduced exercise tolerance.  ABDOMEN: Appetite fine. No weight loss. Denies diarrhea, abdominal pain, hematemesis or blood in stool.  URINARY: No flank pain, dysuria or hematuria.  PERIPHERAL VASCULAR: No claudication or cyanosis.  MUSCULOSKELETAL: No joint stiffness or swelling. Denies back pain.  NEUROLOGIC: No history of seizures, paralysis, alteration of gait or coordination.    OBJECTIVE:  APPEARANCE: normal appearance  Vitals:    03/31/17 0937   BP: (!) 130/56   Pulse: 60   Temp: 98.2 °F (36.8 °C)     SKIN: Normal skin turgor, no lesions.  HEENT: Both external auditory canals clear. Both tympanic membranes intact. PERRL. EOMI. Disk margins sharp  . No tonsillar enlargement. No pharyngeal erythema or exudate. No stridor.  NECK: No bruits. No cervical spine tenderness. No cervical lymphadenopathy. No thyromegaly.  NODES: No cervical, axillary or inguinal lymph node enlargement.  CHEST: Breath sounds clear bilaterally. Lungs clear to auscultation & percussion. Good air movement. No rales. No retractions. No rhonchi. No stridor. No wheezes.  CARDIOVASCULAR: Normal S1, S2. No murmurs. No edema.  BREASTS: no masses palpated in either breast or axillary area, symmetry noted.  ABDOMEN: Bowel sounds normal. No palpable aortic enlargement. No CVA tenderness. No pulsatile mass. No rebound tenderness.  PERIPHERAL VASCULAR: Femoral pulses present and symmetrical. No edema.  MUSCULOSKELETAL: Degenerative changes of both ankles, foot, knee, wrist and hand.  BACK: No CVA tenderness. There is no spasm, tenderness or radiculopathy noted with palpation and there is full range of motion.   NEUROLOGIC:   Cranial Nerves: II-XII grossly intact.  Motor: 5/5 strength major flexors/extensors. No tremor.  DTR's: Knees, Ankles 2+ and equal bilaterally; downgoing toes.  Sensory: Intact to light touch  distally.  Gait & Posture: Normal gait and fine motion. No cerebellar signs.  MENTAL STATUS: Alert. Oriented x 3. Language skills normal. Memory intact Well kept appearance.    ASSESSMENT/PLAN:   Ewelina was seen today for annual exam.    Diagnoses and all orders for this visit:    Weight loss  -     Ambulatory referral to Nutrition Services  -     Ambulatory referral to Rheumatology    Anemia, unspecified type  -     Ambulatory referral to Nutrition Services    Mouth ulcer  -     triamcinolone acetonide 0.1% (KENALOG) 0.1 % paste; Place onto teeth 2 (two) times daily.    Chronic kidney disease, stage III (moderate)   -     Ambulatory referral to Nutrition Services    Systemic lupus erythematosus, unspecified SLE type, unspecified organ involvement status  appt with James scheduled.    Hx of Colon cancer  Recent normal colonoscopy     LABS discussed at this appt.    F/u in 2 months reevalute weight and symptoms.

## 2017-04-03 RX ORDER — LATANOPROST 50 UG/ML
SOLUTION/ DROPS OPHTHALMIC
Qty: 3 BOTTLE | Refills: 3 | Status: ON HOLD | OUTPATIENT
Start: 2017-04-03 | End: 2018-01-22 | Stop reason: SDUPTHER

## 2017-04-04 ENCOUNTER — TELEPHONE (OUTPATIENT)
Dept: OPHTHALMOLOGY | Facility: CLINIC | Age: 72
End: 2017-04-04

## 2017-04-04 NOTE — TELEPHONE ENCOUNTER
----- Message from Lisa Canales sent at 4/4/2017 11:28 AM CDT -----  Contact: Pt  Pt would like to speak to Dr. Mccray's nurse about her upcoming appointment. She would like to know if she necessarily need that appointment. She can be reached at [phone]

## 2017-04-10 ENCOUNTER — PATIENT MESSAGE (OUTPATIENT)
Dept: GASTROENTEROLOGY | Facility: CLINIC | Age: 72
End: 2017-04-10

## 2017-04-11 ENCOUNTER — TELEPHONE (OUTPATIENT)
Dept: NEPHROLOGY | Facility: CLINIC | Age: 72
End: 2017-04-11

## 2017-04-11 NOTE — TELEPHONE ENCOUNTER
----- Message from Florencio Holliday sent at 4/11/2017 11:44 AM CDT -----  Contact: PT  Would like to discus her appt on 5/21/17, for her urine specimen.    Call: [phone]     Called pt to explain whats a urine specimen, she was please in the answer i gave her.

## 2017-04-12 ENCOUNTER — PATIENT MESSAGE (OUTPATIENT)
Dept: GASTROENTEROLOGY | Facility: CLINIC | Age: 72
End: 2017-04-12

## 2017-04-12 ENCOUNTER — NUTRITION (OUTPATIENT)
Dept: NUTRITION | Facility: CLINIC | Age: 72
End: 2017-04-12
Payer: MEDICARE

## 2017-04-12 ENCOUNTER — TELEPHONE (OUTPATIENT)
Dept: GASTROENTEROLOGY | Facility: CLINIC | Age: 72
End: 2017-04-12

## 2017-04-12 VITALS — WEIGHT: 132.06 LBS | HEIGHT: 65 IN | BODY MASS INDEX: 22 KG/M2

## 2017-04-12 DIAGNOSIS — M32.9 SYSTEMIC LUPUS ERYTHEMATOSUS, UNSPECIFIED SLE TYPE, UNSPECIFIED ORGAN INVOLVEMENT STATUS: Chronic | ICD-10-CM

## 2017-04-12 DIAGNOSIS — R63.0 DECREASED APPETITE: ICD-10-CM

## 2017-04-12 DIAGNOSIS — N18.4 CKD (CHRONIC KIDNEY DISEASE), STAGE IV: ICD-10-CM

## 2017-04-12 DIAGNOSIS — M85.80 OSTEOPENIA, UNSPECIFIED LOCATION: Chronic | ICD-10-CM

## 2017-04-12 DIAGNOSIS — N18.30 CHRONIC KIDNEY DISEASE, STAGE III (MODERATE): ICD-10-CM

## 2017-04-12 DIAGNOSIS — R63.4 WEIGHT LOSS: ICD-10-CM

## 2017-04-12 DIAGNOSIS — R63.4 WEIGHT LOSS, ABNORMAL: ICD-10-CM

## 2017-04-12 DIAGNOSIS — I51.89 LEFT VENTRICULAR DIASTOLIC DYSFUNCTION WITH PRESERVED SYSTOLIC FUNCTION: Primary | ICD-10-CM

## 2017-04-12 DIAGNOSIS — Z85.038 PERSONAL HISTORY OF COLON CANCER: ICD-10-CM

## 2017-04-12 DIAGNOSIS — Z85.038 HISTORY OF COLON CANCER: ICD-10-CM

## 2017-04-12 PROCEDURE — 99999 PR PBB SHADOW E&M-EST. PATIENT-LVL II: CPT | Mod: PBBFAC,,,

## 2017-04-12 PROCEDURE — 97802 MEDICAL NUTRITION INDIV IN: CPT | Mod: S$GLB,,, | Performed by: DIETITIAN, REGISTERED

## 2017-04-12 RX ORDER — CHOLESTYRAMINE 4 G/9G
4 POWDER, FOR SUSPENSION ORAL
Qty: 90 PACKET | Refills: 4 | Status: ON HOLD | OUTPATIENT
Start: 2017-04-12 | End: 2018-03-28 | Stop reason: HOSPADM

## 2017-04-12 RX ORDER — CHOLESTYRAMINE 4 G/9G
4 POWDER, FOR SUSPENSION ORAL
Qty: 270 PACKET | Refills: 3 | Status: SHIPPED | OUTPATIENT
Start: 2017-04-12 | End: 2017-05-03 | Stop reason: SDUPTHER

## 2017-04-12 NOTE — PROGRESS NOTES
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 ( followed by Dr Ruffin)    Pertinent Social History: Retired  with [employer] ;  Lives alone, prepares meals for self; limited dining out due to concerns regarding sodium content of food   Medical History:     Past Medical History:   Diagnosis Date    Allergy     Anatomical narrow angle glaucoma     Anemia     States diagnosed about a month ago    Aortic atherosclerosis     Arthritis     Cataract     Chronic kidney disease     Chronic sciatica of left side     Right lower back pain due to sciatica    Coronary artery disease     Depression     Dry eyes     GERD (gastroesophageal reflux disease)     History of colon cancer     Hyperlipidemia     Hypertension     Hypothyroidism     Left ventricular diastolic dysfunction with preserved systolic function     Lupus (systemic lupus erythematosus) 8/17/2012    Osteoarthritis of cervical spine 8/17/2012    Osteopenia     PAD (peripheral artery disease)        Past Surgical History:   Procedure Laterality Date    CARDIAC CATHETERIZATION  07/27/2011    x1    CHOLECYSTECTOMY  1999    COLON SURGERY  2000 & 2003    partial removal    COLONOSCOPY N/A 4/14/2016    Procedure: COLONOSCOPY;  Surgeon: Jose Steen MD;  Location: [facility] ([address] [place] [address]);  Service: Endoscopy;  Laterality: N/A;  prep 2 days prior light meals only/clear liquid day before  and 4 dulcolax tabs (5 mgs) at noon    EYE SURGERY      HAND SURGERY Bilateral 1996 & 1997    due to carpal tunnel     HERNIA REPAIR      HYSTERECTOMY  1983    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 385 g 2    aspirin (ECOTRIN) 81 MG EC tablet Take 1 tablet (81 mg total) by mouth once daily. (Patient taking differently: Take 81 mg by mouth once daily. 1 tablet every other day)  0    betamethasone dipropionate (DIPROLENE) 0.05 % cream Apply topically 2 (two) times daily as needed. 45 g 2    butalbital-aspirin-caffeine -40 mg (FIORINAL) -40 mg Cap Take 1 capsule by mouth.      carvedilol (COREG) 12.5 MG tablet TAKE 1 TABLET TWICE DAILY WITH MEALS 180 tablet 3    cholestyramine-aspartame (CHOLESTYRAMINE LIGHT) 4 gram PwPk Take by mouth.      citalopram (CELEXA) 10 MG tablet TAKE 1 TABLET EVERY DAY 90 tablet 3    conjugated estrogens (PREMARIN) vaginal cream Use 1g nightly for two weeks, then 1g twice per week. 30 g 12    ergocalciferol (ERGOCALCIFEROL) 50,000 unit Cap Take 1 capsule (50,000 Units total) by mouth every 30 days. 3 capsule 3    ferrous gluconate (FERGON) 325 MG Tab Take 1 tablet (325 mg total) by mouth daily with breakfast.  0    fexofenadine (ALLEGRA) 180 MG tablet Take 1 tablet (180 mg total) by mouth once daily. 30 tablet 2    furosemide (LASIX) 20 MG tablet TAKE 1 TABLET TWICE DAILY (Patient taking differently: TAKE 1/2 tablet daily) 180 tablet 11    gentamicin-prednisoLONE (PRED G) 0.3-1 % ophthalmic drops 2 drops to the affected ear 3 times a day for 5 days.  Please let patient know that ophthalmic drops can be used in the ear. 3 mL 0    hydrALAZINE (APRESOLINE) 10 MG tablet TAKE 2 TABLETS EVERY 12 HOURS 360 tablet 3    hydroxychloroquine  (PLAQUENIL) 200 mg tablet TAKE 1 TABLET TWICE DAILY 180 tablet 1    hyoscyamine (LEVSIN/SL) 0.125 mg Subl Place 1 tablet (0.125 mg total) under the tongue 2 (two) times daily as needed. 60 tablet 1    ipratropium (ATROVENT) 0.03 % nasal spray 2 sprays by Nasal route 2 (two) times daily as needed for Rhinitis. 30 mL 0    latanoprost 0.005 % ophthalmic solution INSTILL 1 DROP INTO BOTH EYES EVERY DAY 3 Bottle 3    levothyroxine (SYNTHROID) 50 MCG tablet TAKE 1 TABLET BEFORE BREAKFAST DAILY 90 tablet 3    meclizine (ANTIVERT) 12.5 mg tablet Take 1 tablet (12.5 mg total) by mouth 3 (three) times daily as needed. 30 tablet 1    mometasone (ELOCON) 0.1 % ointment Apply topically once daily. 45 g 3    multivitamin capsule Take 1 capsule by mouth once daily.      nitroGLYCERIN (NITROSTAT) 0.4 MG SL tablet PLACE 1 TABLET UNDER THE TONGUE EVERY 5 MINUTES FOR 3 DOSES --IF NOT RELIEVED GO TO THE ER 25 tablet 6    nystatin-triamcinolone (MYCOLOG II) cream Apply topically 4 (four) times daily. 60 g 0    omeprazole (PRILOSEC) 40 MG capsule Take 1 capsule (40 mg total) by mouth once daily. 30 capsule 3    rosuvastatin (CRESTOR) 40 MG Tab Take 1 tablet (40 mg total) by mouth every evening. 90 tablet 3    TENS units Sravanthi 1 application by Misc.(Non-Drug; Combo Route) route daily as needed (pain). 1 Device 0    triamcinolone acetonide 0.1% (KENALOG) 0.1 % paste PLACE ONTO TEETH TWICE DAILY (Patient taking differently: PLACE ONTO TEETH PRN) 5 g 0    triamcinolone acetonide 0.1% (KENALOG) 0.1 % paste Place onto teeth 2 (two) times daily. 5 g 12     No current facility-administered medications on file prior to visit.        Vitamins/Supplements/Herbs: Iron, one a day vitamins taken sporadically   Food intolerances or allergies: lactose intolerance ; notes recent intolerance of high fiber foods which she has consumed in the past ( raw vegetables and fruits, high fat foods) but has not noticed a pattern .   Labs:       "Chemistry        Component Value Date/Time     03/23/2017 0952    K 4.5 03/23/2017 0952     (H) 03/23/2017 0952    CO2 19 (L) 03/23/2017 0952    BUN 19 03/23/2017 0952    CREATININE 1.4 03/23/2017 0952    GLU 72 03/23/2017 0952        Component Value Date/Time    CALCIUM 8.9 03/23/2017 0952    ALKPHOS 65 03/23/2017 0952    AST 34 03/23/2017 0952    ALT 18 03/23/2017 0952    BILITOT 0.3 03/23/2017 0952            Lab Results   Component Value Date    CHOL 123 03/23/2017    CHOL 137 01/24/2017    CHOL 119 (L) 11/02/2016     Lab Results   Component Value Date    HDL 39 (L) 03/23/2017    HDL 54 01/24/2017    HDL 46 11/02/2016     Lab Results   Component Value Date    LDLCALC 68.2 03/23/2017    LDLCALC 71.6 01/24/2017    LDLCALC 61.6 (L) 11/02/2016     Lab Results   Component Value Date    TRIG 79 03/23/2017    TRIG 57 01/24/2017    TRIG 57 11/02/2016     Lab Results   Component Value Date    CHOLHDL 31.7 03/23/2017    CHOLHDL 39.4 01/24/2017    CHOLHDL 38.7 11/02/2016       Lab Results   Component Value Date    HGBA1C 5.7 01/24/2017                  Ht: 5' 5" (1.651 m)     Wt: 59.9 kg (132 lb 0.9 oz)    BMI: Body mass index is 21.98 kg/(m^2).    Weight status: consistently decreasing  Vitals - 1 value per visit 11/2/2016 11/3/2016 11/17/2016 11/29/2016   SYSTOLIC 154 179 142 144   DIASTOLIC 67 77 72 68   PULSE 68 64 56 57   TEMPERATURE  97.7 98.2    RESPIRATIONS  16     SPO2  99     Weight (lb) 137.8 135 134 134.8   Weight (kg) 62.506 61.236 60.782 61.145   HEIGHT 5' 5" 5' 5" 5' 5" 5' 5"   BODY MASS INDEX 22.93 22.47 22.3 22.43   VISIT REPORT       Pain Score  6  3 0     Vitals - 1 value per visit 12/7/2016 12/8/2016 12/14/2016 1/23/2017   SYSTOLIC 138 120 115 132   DIASTOLIC 60 62 58 60   PULSE 59 68 64 56   TEMPERATURE 98 98.1  98.5   RESPIRATIONS       SPO2 98      Weight (lb) 135.8 136.47 135.8 131.84   Weight (kg) 61.6 61.9 61.6 59.8   HEIGHT 5' 5" 5' 5" 5' 5" 5' 5"   BODY MASS INDEX 22.6 22.71 22.6 " "21.94   VISIT REPORT       Pain Score  0 5 8 5     Vitals - 1 value per visit 2/13/2017 2/17/2017 3/16/2017 3/17/2017   SYSTOLIC  132 135    DIASTOLIC  68 76    PULSE   72    TEMPERATURE   98    RESPIRATIONS   16    SPO2   100    Weight (lb)  135.58 130    Weight (kg)  61.5 58.968    HEIGHT   5' 5"    BODY MASS INDEX  22.56 21.63    VISIT REPORT       Pain Score  0 0  0     Vitals - 1 value per visit 3/31/2017 4/12/2017   SYSTOLIC 130    DIASTOLIC 56    PULSE 60    TEMPERATURE 98.2    RESPIRATIONS     SPO2     Weight (lb) 131.17 132.06   Weight (kg) 59.5 59.9   HEIGHT 5' 5" 5' 5"   BODY MASS INDEX 21.83 21.98   VISIT REPORT     Pain Score        Calculated Calorie needs: 2828-2451 calories     Calculated Protein needs: 50-60 grams     Physical Activity : Participates in exercise class 3 times per week ( stretching, range of motion etc)     Nutrition History   Cooks intermittently but prefers simply prepared food items ; dines out infrequently due to difficulty in controlling sodium content of prepared foods ; protein intake approximately 60 grams per day     Meal patterns: 3 meals daily  Breakfast: corn flakes with soy milk, or peanut butter and jelly sandwich on whole wheat bread ; will sometimes prepare eggs, oatmeal, grits   Lunch: sandwich made with minimally processed turkey ; whole wheat bread, lettuce and tomato or vegetable pizza if eating out or homemade hamburger at home   Dinner: Red beans ( mashed) , rice, or turkey with pasta and salad or greens   Meal preparation/shopping: self    Nutrition beverages: mainly drinks water, drinks caffeine    Dining out: Infrequent, 1-2 times per week at most    Smoking/alcohol: nonsmoker, no alcohol    Nutrition Diagnosis:     Primary Problem : Inconsistent fiber intake recently    Etiology: Related to perception of increased frequency with intake of raw fruits and vegetables   Signs and Symptoms : As evidenced by diet recall    Primary problem : Decreased appetite/ " decreased food intake     Etiology : Related to recent frequent semi-formed stools   Signs and Symptoms : As evidenced by diet recall    Primary Problem: Inadequate calorie/protein intake  Etiology : Related to lack of appetite/uncertainty as to bowel response to dietary intake   Signs and symptoms :       Primary problem:Irregular bowel habits   Etiology: Related to inadequate/inconsistent fiber intake , fat intake , s/p cholecystectomy   Signs and symptoms : As evidenced by diet recall     Primary Problem : restricted sodium intake   Etiology : Related to cardiology recommendations   Signs and Symptoms : As evidenced by diet recall   Secondary Problem: Solitary kidney with GFR of 40 and creat of 1.5   Etiology: Related to organ donation and conservative fluid intake   Signs and Symptoms :   As evidenced by diet recall        Patient-specific Challenges:   Uncertainty as to what can be eaten without triggering bowel movement and loose stools   Concern about weight loss   Marginal appetite partly related to uncertainty as to what will be tolerated   Sodium restriction for heart health   Motivation to change: high     Patient Goal :   Weight maintenance   Enjoyment of meals without anxiety about frequent bowel movement       Interventions   Discussed role of colon in bowel movement formation and sources of fiber and their role in stool formation ( soluble and insoluble fiber)   Discussed potential role of  impact of Cholecystectomy on fat tolerance  Suggested use of Crock pot in meal preparation and provided suggestions for AHA low sodium crock pot recipes and dash diet recipes ( low fat and sodium)   Discussed types of fiber and approach of starting with low fiber diet and cooking all vegetables and using canned fruits instead of fresh with gradual advancement of soluble fiber and insoluble fiber; moderate fat intake with GI opinion about restarting cholestyramine pending completion of work up.   Handouts    [facility] GI medicine : Restricted fiber ; guidelines for low Fodmap ; meal plan and snacks from short bowel guidelines with modifications for lactose and sodium   Consideration of lactose free meal replacement ( Enlive Clear)          Patient demonstrated understanding: excellent; patient is proactively involved in her self-care      Nutrition follow-up: Email contact info provided ; will message Elizabeth Cohen about patient's interest in restarting cholestyramine as an adjunct to diet modifications; may calcium supplementation given avoidance of milk products    Counseling time: 1 Hour

## 2017-04-12 NOTE — TELEPHONE ENCOUNTER
I think that would be a great option to try again.  Thanks for the up date.  I am not sure when the prescription is from, but I will send a new one to her pharmacy and notify her.  Thanks.  -Elizabeth

## 2017-04-12 NOTE — TELEPHONE ENCOUNTER
Spoke with Pt to let her know that her Rx was sent to her pharmacy and that the NP spoke with the Dietician and this would be very helpful for her. Verbal Understanding.        ----- Message from GERALDINE Hayes sent at 4/12/2017  1:10 PM CDT -----  Please call patient and let her know that I talked to the dietician and have sent a new prescription for the cholestyramine that she has previously.  Let her know that I agree that this could be helpful.

## 2017-04-12 NOTE — TELEPHONE ENCOUNTER
----- Message from Alexandra Easley RD sent at 4/12/2017 11:49 AM CDT -----  Contact: evangelina Dietitian   Patient seen today for frequent stools and as we were reviewing meds , cholestyramine noted and patient reported she had stopped taking it but wondered if it might be helpful in managing current inconsistent bowel pattern. Diet history indicates some fat sensitivity in diet triggering BM 30-min following consumption of higher fat meal ( peanut butter etc) .

## 2017-04-19 ENCOUNTER — TELEPHONE (OUTPATIENT)
Dept: FAMILY MEDICINE | Facility: CLINIC | Age: 72
End: 2017-04-19

## 2017-04-19 DIAGNOSIS — D72.819 LEUKOPENIA, UNSPECIFIED TYPE: Primary | ICD-10-CM

## 2017-04-19 NOTE — TELEPHONE ENCOUNTER
----- Message from Tran Wilkins sent at 4/19/2017 10:54 AM CDT -----  Contact: call [phone]  Patient is calling about last appointment she had was told blood count was low and she would like to have follow up labs done. Patient has labs scheduled this Friday 04/21/17 from Dr Ruffin, and would like to have you add blood count to that lab order. Patient is asking for a phone call to let her know if this was added or not

## 2017-04-21 ENCOUNTER — LAB VISIT (OUTPATIENT)
Dept: LAB | Facility: HOSPITAL | Age: 72
End: 2017-04-21
Attending: INTERNAL MEDICINE
Payer: MEDICARE

## 2017-04-21 DIAGNOSIS — D72.819 LEUKOPENIA, UNSPECIFIED TYPE: ICD-10-CM

## 2017-04-21 DIAGNOSIS — M32.9 LUPUS (SYSTEMIC LUPUS ERYTHEMATOSUS): Chronic | ICD-10-CM

## 2017-04-21 DIAGNOSIS — M85.80 OSTEOPENIA: Chronic | ICD-10-CM

## 2017-04-21 DIAGNOSIS — N18.30 CKD (CHRONIC KIDNEY DISEASE) STAGE 3, GFR 30-59 ML/MIN: Chronic | ICD-10-CM

## 2017-04-21 LAB
25(OH)D3+25(OH)D2 SERPL-MCNC: 28 NG/ML
ALBUMIN SERPL BCP-MCNC: 3 G/DL
ALP SERPL-CCNC: 68 U/L
ALT SERPL W/O P-5'-P-CCNC: 28 U/L
ANION GAP SERPL CALC-SCNC: 5 MMOL/L
AST SERPL-CCNC: 41 U/L
BASOPHILS # BLD AUTO: 0.02 K/UL
BASOPHILS # BLD AUTO: 0.02 K/UL
BASOPHILS NFR BLD: 0.4 %
BASOPHILS NFR BLD: 0.4 %
BILIRUB SERPL-MCNC: 0.1 MG/DL
BUN SERPL-MCNC: 32 MG/DL
C3 SERPL-MCNC: 127 MG/DL
C4 SERPL-MCNC: 26 MG/DL
CALCIUM SERPL-MCNC: 8.8 MG/DL
CHLORIDE SERPL-SCNC: 112 MMOL/L
CO2 SERPL-SCNC: 22 MMOL/L
CREAT SERPL-MCNC: 1.5 MG/DL
CRP SERPL-MCNC: <0.1 MG/L
DIFFERENTIAL METHOD: ABNORMAL
DIFFERENTIAL METHOD: ABNORMAL
EOSINOPHIL # BLD AUTO: 0.1 K/UL
EOSINOPHIL # BLD AUTO: 0.1 K/UL
EOSINOPHIL NFR BLD: 2.9 %
EOSINOPHIL NFR BLD: 2.9 %
ERYTHROCYTE [DISTWIDTH] IN BLOOD BY AUTOMATED COUNT: 12.9 %
ERYTHROCYTE [DISTWIDTH] IN BLOOD BY AUTOMATED COUNT: 12.9 %
ERYTHROCYTE [SEDIMENTATION RATE] IN BLOOD BY WESTERGREN METHOD: 40 MM/HR
EST. GFR  (AFRICAN AMERICAN): 40.1 ML/MIN/1.73 M^2
EST. GFR  (NON AFRICAN AMERICAN): 34.8 ML/MIN/1.73 M^2
GLUCOSE SERPL-MCNC: 88 MG/DL
HCT VFR BLD AUTO: 33.1 %
HCT VFR BLD AUTO: 33.1 %
HGB BLD-MCNC: 10.5 G/DL
HGB BLD-MCNC: 10.5 G/DL
LYMPHOCYTES # BLD AUTO: 1.3 K/UL
LYMPHOCYTES # BLD AUTO: 1.3 K/UL
LYMPHOCYTES NFR BLD: 27.7 %
LYMPHOCYTES NFR BLD: 27.7 %
MCH RBC QN AUTO: 26.3 PG
MCH RBC QN AUTO: 26.3 PG
MCHC RBC AUTO-ENTMCNC: 31.7 %
MCHC RBC AUTO-ENTMCNC: 31.7 %
MCV RBC AUTO: 83 FL
MCV RBC AUTO: 83 FL
MONOCYTES # BLD AUTO: 0.7 K/UL
MONOCYTES # BLD AUTO: 0.7 K/UL
MONOCYTES NFR BLD: 13.9 %
MONOCYTES NFR BLD: 13.9 %
NEUTROPHILS # BLD AUTO: 2.7 K/UL
NEUTROPHILS # BLD AUTO: 2.7 K/UL
NEUTROPHILS NFR BLD: 54.9 %
NEUTROPHILS NFR BLD: 54.9 %
PLATELET # BLD AUTO: 194 K/UL
PLATELET # BLD AUTO: 194 K/UL
PMV BLD AUTO: 10.4 FL
PMV BLD AUTO: 10.4 FL
POTASSIUM SERPL-SCNC: 3.8 MMOL/L
PROT SERPL-MCNC: 7 G/DL
RBC # BLD AUTO: 3.99 M/UL
RBC # BLD AUTO: 3.99 M/UL
SODIUM SERPL-SCNC: 139 MMOL/L
WBC # BLD AUTO: 4.83 K/UL
WBC # BLD AUTO: 4.83 K/UL

## 2017-04-21 PROCEDURE — 82306 VITAMIN D 25 HYDROXY: CPT

## 2017-04-21 PROCEDURE — 86160 COMPLEMENT ANTIGEN: CPT | Mod: 59

## 2017-04-21 PROCEDURE — 85025 COMPLETE CBC W/AUTO DIFF WBC: CPT

## 2017-04-21 PROCEDURE — 85651 RBC SED RATE NONAUTOMATED: CPT

## 2017-04-21 PROCEDURE — 86160 COMPLEMENT ANTIGEN: CPT

## 2017-04-21 PROCEDURE — 36415 COLL VENOUS BLD VENIPUNCTURE: CPT | Mod: PO

## 2017-04-21 PROCEDURE — 86225 DNA ANTIBODY NATIVE: CPT

## 2017-04-21 PROCEDURE — 86140 C-REACTIVE PROTEIN: CPT

## 2017-04-21 PROCEDURE — 80053 COMPREHEN METABOLIC PANEL: CPT

## 2017-04-24 LAB — DSDNA AB SER-ACNC: NORMAL [IU]/ML

## 2017-04-25 ENCOUNTER — PATIENT MESSAGE (OUTPATIENT)
Dept: FAMILY MEDICINE | Facility: CLINIC | Age: 72
End: 2017-04-25

## 2017-04-25 ENCOUNTER — PATIENT MESSAGE (OUTPATIENT)
Dept: GASTROENTEROLOGY | Facility: CLINIC | Age: 72
End: 2017-04-25

## 2017-04-25 ENCOUNTER — OFFICE VISIT (OUTPATIENT)
Dept: NEPHROLOGY | Facility: CLINIC | Age: 72
End: 2017-04-25
Payer: MEDICARE

## 2017-04-25 ENCOUNTER — TELEPHONE (OUTPATIENT)
Dept: FAMILY MEDICINE | Facility: CLINIC | Age: 72
End: 2017-04-25

## 2017-04-25 VITALS
WEIGHT: 132.06 LBS | SYSTOLIC BLOOD PRESSURE: 136 MMHG | HEART RATE: 57 BPM | HEIGHT: 65 IN | BODY MASS INDEX: 22 KG/M2 | OXYGEN SATURATION: 98 % | DIASTOLIC BLOOD PRESSURE: 70 MMHG

## 2017-04-25 DIAGNOSIS — R80.9 PROTEINURIA, UNSPECIFIED TYPE: ICD-10-CM

## 2017-04-25 DIAGNOSIS — M32.9 SYSTEMIC LUPUS ERYTHEMATOSUS, UNSPECIFIED SLE TYPE, UNSPECIFIED ORGAN INVOLVEMENT STATUS: ICD-10-CM

## 2017-04-25 DIAGNOSIS — I10 ESSENTIAL HYPERTENSION: ICD-10-CM

## 2017-04-25 DIAGNOSIS — N18.30 CHRONIC KIDNEY DISEASE (CKD), STAGE III (MODERATE): Primary | ICD-10-CM

## 2017-04-25 PROCEDURE — 99499 UNLISTED E&M SERVICE: CPT | Mod: S$GLB,,, | Performed by: INTERNAL MEDICINE

## 2017-04-25 PROCEDURE — 3078F DIAST BP <80 MM HG: CPT | Mod: S$GLB,,, | Performed by: INTERNAL MEDICINE

## 2017-04-25 PROCEDURE — 1159F MED LIST DOCD IN RCRD: CPT | Mod: S$GLB,,, | Performed by: INTERNAL MEDICINE

## 2017-04-25 PROCEDURE — 99999 PR PBB SHADOW E&M-EST. PATIENT-LVL V: CPT | Mod: PBBFAC,,, | Performed by: INTERNAL MEDICINE

## 2017-04-25 PROCEDURE — 1160F RVW MEDS BY RX/DR IN RCRD: CPT | Mod: S$GLB,,, | Performed by: INTERNAL MEDICINE

## 2017-04-25 PROCEDURE — 99213 OFFICE O/P EST LOW 20 MIN: CPT | Mod: S$GLB,,, | Performed by: INTERNAL MEDICINE

## 2017-04-25 PROCEDURE — 3075F SYST BP GE 130 - 139MM HG: CPT | Mod: S$GLB,,, | Performed by: INTERNAL MEDICINE

## 2017-04-25 PROCEDURE — 1126F AMNT PAIN NOTED NONE PRSNT: CPT | Mod: S$GLB,,, | Performed by: INTERNAL MEDICINE

## 2017-04-25 NOTE — TELEPHONE ENCOUNTER
premarin was increased on 3/23/17 from 1g nightly for two weeks then 1 g twice per week to 1g nightly.

## 2017-04-25 NOTE — MR AVS SNAPSHOT
[facility] - Nephrology  [address]  [facility] [address]  Phone: [phone]  Fax: [phone]                  Ewelina Arnold   2017 10:00 AM   Office Visit    Description:  Female : 1945   Provider:  Maciel Ruffin MD   Department:  [facility]           Reason for Visit     Chronic Kidney Disease           Diagnoses this Visit        Comments    Chronic kidney disease (CKD), stage III (moderate)    -  Primary     Solitary kidney         Systemic lupus erythematosus, unspecified SLE type, unspecified organ involvement status         Essential hypertension         Proteinuria, unspecified type                To Do List           Future Appointments        Provider Department Dept Phone    5/3/2017 11:30 AM Luciano Ramirez MD [facility] Rheumatology [phone]    2017 10:30 AM Elham Wyman MD [facility] Cardiology [phone]    7/3/2017 10:00 AM Luciano Ramirez MD [facility] Rheumatology [phone]    2017 9:15 AM THOMAS, VISUAL LAY [facility] Ophthalmology [phone]    2017 10:00 AM Marky Mccray MD [facility] Ophthalmology [phone]      Goals (5 Years of Data)     None      [facility]     [facility] Nurse Care Line - 24/ Assistance  Unless otherwise directed by your provider, please contact [facility], our nurse care line that is available for  assistance.     Registered nurses in the [facility] provide: appointment scheduling, clinical advisement, health education, and other advisory services.  Call: [phone] (toll free)               Medications           Message regarding Medications     Verify the changes and/or additions to your medication regime listed below are the same as discussed with your clinician today.  If any of these changes or additions are incorrect, please notify your healthcare provider.             Verify that the below list of medications is an accurate  representation of the medications you are currently taking.  If none reported, the list may be blank. If incorrect, please contact your healthcare provider. Carry this list with you in case of emergency.           Current Medications     amlodipine (NORVASC) 10 MG tablet TAKE 1 TABLET EVERY DAY    ammonium lactate 12 % Crea Apply 1 application topically 2 (two) times daily as needed.    butalbital-aspirin-caffeine -40 mg (FIORINAL) -40 mg Cap Take 1 capsule by mouth.    carvedilol (COREG) 12.5 MG tablet TAKE 1 TABLET TWICE DAILY WITH MEALS    cholestyramine (QUESTRAN) 4 gram packet Take 1 packet (4 g total) by mouth 3 (three) times daily with meals.    cholestyramine (QUESTRAN) 4 gram packet Take 1 packet (4 g total) by mouth 3 (three) times daily with meals.    citalopram (CELEXA) 10 MG tablet TAKE 1 TABLET EVERY DAY    conjugated estrogens (PREMARIN) vaginal cream Use 1g nightly for two weeks, then 1g twice per week.    ergocalciferol (ERGOCALCIFEROL) 50,000 unit Cap Take 1 capsule (50,000 Units total) by mouth every 30 days.    ferrous gluconate (FERGON) 325 MG Tab Take 1 tablet (325 mg total) by mouth daily with breakfast.    furosemide (LASIX) 20 MG tablet TAKE 1 TABLET TWICE DAILY    gentamicin-prednisoLONE (PRED G) 0.3-1 % ophthalmic drops 2 drops to the affected ear 3 times a day for 5 days.  Please let patient know that ophthalmic drops can be used in the ear.    hydrALAZINE (APRESOLINE) 10 MG tablet TAKE 2 TABLETS EVERY 12 HOURS    hydroxychloroquine (PLAQUENIL) 200 mg tablet TAKE 1 TABLET TWICE DAILY    hyoscyamine (LEVSIN/SL) 0.125 mg Subl Place 1 tablet (0.125 mg total) under the tongue 2 (two) times daily as needed.    ipratropium (ATROVENT) 0.03 % nasal spray 2 sprays by Nasal route 2 (two) times daily as needed for Rhinitis.    latanoprost 0.005 % ophthalmic solution INSTILL 1 DROP INTO BOTH EYES EVERY DAY    levothyroxine (SYNTHROID) 50 MCG tablet TAKE 1 TABLET BEFORE BREAKFAST DAILY     "meclizine (ANTIVERT) 12.5 mg tablet Take 1 tablet (12.5 mg total) by mouth 3 (three) times daily as needed.    multivitamin capsule Take 1 capsule by mouth once daily.    nitroGLYCERIN (NITROSTAT) 0.4 MG SL tablet PLACE 1 TABLET UNDER THE TONGUE EVERY 5 MINUTES FOR 3 DOSES --IF NOT RELIEVED GO TO THE ER    nystatin-triamcinolone (MYCOLOG II) cream Apply topically 4 (four) times daily.    omeprazole (PRILOSEC) 40 MG capsule Take 1 capsule (40 mg total) by mouth once daily.    rosuvastatin (CRESTOR) 40 MG Tab Take 1 tablet (40 mg total) by mouth every evening.    TENS units Sravanthi 1 application by Misc.(Non-Drug; Combo Route) route daily as needed (pain).    triamcinolone acetonide 0.1% (KENALOG) 0.1 % paste PLACE ONTO TEETH TWICE DAILY    triamcinolone acetonide 0.1% (KENALOG) 0.1 % paste Place onto teeth 2 (two) times daily.    aspirin (ECOTRIN) 81 MG EC tablet Take 1 tablet (81 mg total) by mouth once daily.    betamethasone dipropionate (DIPROLENE) 0.05 % cream Apply topically 2 (two) times daily as needed.    fexofenadine (ALLEGRA) 180 MG tablet Take 1 tablet (180 mg total) by mouth once daily.    mometasone (ELOCON) 0.1 % ointment Apply topically once daily.           Clinical Reference Information           Your Vitals Were     BP Pulse Height Weight SpO2 BMI    136/70 57 5' 5" (1.651 m) 59.9 kg (132 lb 0.9 oz) 98% 21.98 kg/m2      Blood Pressure          Most Recent Value    BP  136/70      Allergies as of 4/25/2017     Ace Inhibitors    Arthrotec 50  [Diclofenac-misoprostol]    Bactrim [Sulfamethoxazole-trimethoprim]    Bentyl [Dicyclomine]    Doxycycline    Imdur [Isosorbide Mononitrate]    Lomotil [Diphenoxylate-atropine]    Tramadol    Vioxx [Rofecoxib]    Lozol [Indapamide]      Immunizations Administered on Date of Encounter - 4/25/2017     None      Orders Placed During Today's Visit     Future Labs/Procedures Expected by Expires    Protein, urine, timed  As directed 4/25/2018      Language Assistance " Services     ATTENTION: Language assistance services are available, free of charge. Please call [phone].      ATENCIÓN: Si habla johnathanañol, tiene a mera disposición servicios gratuitos de asistencia lingüística. Llame al [phone].     CHÚ Ý: N?u b?n nói Ti?ng Vi?t, có các d?ch v? h? tr? ngôn ng? mi?n phí dành cho b?n. G?i s? [phone].         [facility] complies with applicable Federal civil rights laws and does not discriminate on the basis of race, color, national origin, age, disability, or sex.

## 2017-04-25 NOTE — PROGRESS NOTES
Patient is here today for follow up evaluation of CKD III. Last seen in renal office 12/7/16 Baseline Cr 1.5-1.7 mg/dl Her most recent lab ( 4/21/17) Cr 1.5 mg/dl; eGFR 40 ml/min; potassium 3.8 mmol/L There is a hx of SLE and solitary kidney (donor). She has clinically significant proteinuria (Urine P/C; 1.81) Troubled by various problems including weight loss (10 pounds) and decrease appetite    ROS  Mood and Affect; Appropriate  Otherwise non-contgributory    Exam  Chronically ill woman ; no acute distress; oriented x 3  HEENT; WNL  CHEST; Clear P&A; no rales or rhonchi  HEART; RR; S1&S2 no murmur rub gallop  ABD; BS(+);non-tender; no organomegaly  EXT; Trace edema; lower extremity; bilateral    Impression  CKD III Stable  Proteinuria 24 h urine collection  Hypertension Satisfactory control    Plan  Return Visit; 3 mo; pending above

## 2017-04-25 NOTE — TELEPHONE ENCOUNTER
----- Message from Tran Wilkins sent at 4/25/2017  8:02 AM CDT -----  Contact: call  [phone]  Pt asking to speak with you about a letter she received from [employer] patient assistance about her premarin medication.   Patient stated that you should have received a fax from them but it did not include a name on papers

## 2017-04-27 ENCOUNTER — PATIENT MESSAGE (OUTPATIENT)
Dept: GASTROENTEROLOGY | Facility: CLINIC | Age: 72
End: 2017-04-27

## 2017-04-27 ENCOUNTER — LAB VISIT (OUTPATIENT)
Dept: LAB | Facility: HOSPITAL | Age: 72
End: 2017-04-27
Attending: INTERNAL MEDICINE
Payer: MEDICARE

## 2017-04-27 DIAGNOSIS — R80.9 PROTEINURIA, UNSPECIFIED TYPE: ICD-10-CM

## 2017-04-27 LAB
PROT 24H UR-MRATE: 956 MG/SPEC
PROT UR-MCNC: 78 MG/DL
URINE COLLECTION DURATION: 24 HR
URINE VOLUME: 1225 ML

## 2017-04-27 PROCEDURE — 84156 ASSAY OF PROTEIN URINE: CPT

## 2017-05-02 ENCOUNTER — TELEPHONE (OUTPATIENT)
Dept: NEPHROLOGY | Facility: CLINIC | Age: 72
End: 2017-05-02

## 2017-05-02 ENCOUNTER — PATIENT MESSAGE (OUTPATIENT)
Dept: FAMILY MEDICINE | Facility: CLINIC | Age: 72
End: 2017-05-02

## 2017-05-02 DIAGNOSIS — E55.9 VITAMIN D DEFICIENCY: ICD-10-CM

## 2017-05-02 RX ORDER — ERGOCALCIFEROL 1.25 MG/1
50000 CAPSULE ORAL
Qty: 3 CAPSULE | Refills: 0 | Status: SHIPPED | OUTPATIENT
Start: 2017-05-02 | End: 2017-07-10 | Stop reason: SDUPTHER

## 2017-05-02 NOTE — TELEPHONE ENCOUNTER
----- Message from Shana Ann sent at 5/2/2017  1:33 PM CDT -----  Contact: pateint     [phone]  Laly  -  Patient calling to get her test result from labs   Call back number [phone]  Thanks,      Contacted pt to let her know dr merida was going for today, pt want dr merida to explain to her about the results.

## 2017-05-03 ENCOUNTER — PATIENT MESSAGE (OUTPATIENT)
Dept: FAMILY MEDICINE | Facility: CLINIC | Age: 72
End: 2017-05-03

## 2017-05-03 ENCOUNTER — OFFICE VISIT (OUTPATIENT)
Dept: RHEUMATOLOGY | Facility: CLINIC | Age: 72
End: 2017-05-03
Payer: MEDICARE

## 2017-05-03 VITALS
DIASTOLIC BLOOD PRESSURE: 69 MMHG | BODY MASS INDEX: 24.2 KG/M2 | HEART RATE: 59 BPM | SYSTOLIC BLOOD PRESSURE: 160 MMHG | WEIGHT: 131.5 LBS | HEIGHT: 62 IN

## 2017-05-03 DIAGNOSIS — Z79.899 HIGH RISK MEDICATION USE: ICD-10-CM

## 2017-05-03 DIAGNOSIS — M47.812 SPONDYLOSIS OF CERVICAL REGION WITHOUT MYELOPATHY OR RADICULOPATHY: Chronic | ICD-10-CM

## 2017-05-03 DIAGNOSIS — M32.14 OTHER SYSTEMIC LUPUS ERYTHEMATOSUS WITH GLOMERULAR DISEASE: Primary | Chronic | ICD-10-CM

## 2017-05-03 PROCEDURE — 1125F AMNT PAIN NOTED PAIN PRSNT: CPT | Mod: S$GLB,,, | Performed by: INTERNAL MEDICINE

## 2017-05-03 PROCEDURE — 3078F DIAST BP <80 MM HG: CPT | Mod: S$GLB,,, | Performed by: INTERNAL MEDICINE

## 2017-05-03 PROCEDURE — 1160F RVW MEDS BY RX/DR IN RCRD: CPT | Mod: S$GLB,,, | Performed by: INTERNAL MEDICINE

## 2017-05-03 PROCEDURE — 99999 PR PBB SHADOW E&M-EST. PATIENT-LVL III: CPT | Mod: PBBFAC,,, | Performed by: INTERNAL MEDICINE

## 2017-05-03 PROCEDURE — 99214 OFFICE O/P EST MOD 30 MIN: CPT | Mod: S$GLB,,, | Performed by: INTERNAL MEDICINE

## 2017-05-03 PROCEDURE — 1159F MED LIST DOCD IN RCRD: CPT | Mod: S$GLB,,, | Performed by: INTERNAL MEDICINE

## 2017-05-03 PROCEDURE — 3077F SYST BP >= 140 MM HG: CPT | Mod: S$GLB,,, | Performed by: INTERNAL MEDICINE

## 2017-05-03 ASSESSMENT — ROUTINE ASSESSMENT OF PATIENT INDEX DATA (RAPID3)
PATIENT GLOBAL ASSESSMENT SCORE: 4.5
MDHAQ FUNCTION SCORE: .3
TOTAL RAPID3 SCORE: 2.67
FATIGUE SCORE: 3
PAIN SCORE: 2.5
AM STIFFNESS SCORE: 0, NO
PSYCHOLOGICAL DISTRESS SCORE: 1.1

## 2017-05-03 ASSESSMENT — SYSTEMIC LUPUS ERYTHEMATOSUS DISEASE ACTIVITY INDEX (SLEDAI): TOTAL_SCORE: 4

## 2017-05-03 NOTE — MR AVS SNAPSHOT
[facility] Rheumatology  [address] [facility] [address]  Phone: [phone]  Fax: [phone]                  Ewelina Arnold   5/3/2017 11:30 AM   Office Visit    Description:  Female : 1945   Provider:  Luciano Ramirez MD   Department:  [facility]                To Do List           Future Appointments        Provider Department Dept Phone    2017 9:00 AM HRA, NOM 3 [facility] - Internal Medicine [phone]    2017 10:30 AM MD [facility] Cardiology [phone]    7/3/2017 10:00 AM MD [facility] Rheumatology [phone]    2017 9:15 AM THOMAS, VISUAL LAY [facility] Ophthalmology [phone]    2017 10:00 AM Marky Mccray MD [facility] Ophthalmology [phone]      Goals (5 Years of Data)     None      Follow-Up and Disposition     Return in about 6 months (around 11/3/2017).      [facility]     [facility] On [facility] Nurse Care Line -  Assistance  Unless otherwise directed by your provider, please contact [facility] On-[facility], our nurse care line that is available for  assistance.     Registered nurses in the [facility] provide: appointment scheduling, clinical advisement, health education, and other advisory services.  Call: [phone] (toll free)               Medications           Message regarding Medications     Verify the changes and/or additions to your medication regime listed below are the same as discussed with your clinician today.  If any of these changes or additions are incorrect, please notify your healthcare provider.             Verify that the below list of medications is an accurate representation of the medications you are currently taking.  If none reported, the list may be blank. If incorrect, please contact your healthcare provider. Carry this list with you in case of emergency.           Current Medications     amlodipine (NORVASC) 10 MG tablet TAKE 1  TABLET EVERY DAY    ammonium lactate 12 % Crea Apply 1 application topically 2 (two) times daily as needed.    aspirin (ECOTRIN) 81 MG EC tablet Take 1 tablet (81 mg total) by mouth once daily.    betamethasone dipropionate (DIPROLENE) 0.05 % cream Apply topically 2 (two) times daily as needed.    butalbital-aspirin-caffeine -40 mg (FIORINAL) -40 mg Cap Take 1 capsule by mouth.    carvedilol (COREG) 12.5 MG tablet TAKE 1 TABLET TWICE DAILY WITH MEALS    cholestyramine (QUESTRAN) 4 gram packet Take 1 packet (4 g total) by mouth 3 (three) times daily with meals.    citalopram (CELEXA) 10 MG tablet TAKE 1 TABLET EVERY DAY    conjugated estrogens (PREMARIN) vaginal cream Use 1g nightly for two weeks, then 1g twice per week.    ergocalciferol (ERGOCALCIFEROL) 50,000 unit Cap Take 1 capsule (50,000 Units total) by mouth every 30 days.    ferrous gluconate (FERGON) 325 MG Tab Take 1 tablet (325 mg total) by mouth daily with breakfast.    fexofenadine (ALLEGRA) 180 MG tablet Take 1 tablet (180 mg total) by mouth once daily.    furosemide (LASIX) 20 MG tablet TAKE 1 TABLET TWICE DAILY    gentamicin-prednisoLONE (PRED G) 0.3-1 % ophthalmic drops 2 drops to the affected ear 3 times a day for 5 days.  Please let patient know that ophthalmic drops can be used in the ear.    hydrALAZINE (APRESOLINE) 10 MG tablet TAKE 2 TABLETS EVERY 12 HOURS    hydroxychloroquine (PLAQUENIL) 200 mg tablet TAKE 1 TABLET TWICE DAILY    hyoscyamine (LEVSIN/SL) 0.125 mg Subl Place 1 tablet (0.125 mg total) under the tongue 2 (two) times daily as needed.    ipratropium (ATROVENT) 0.03 % nasal spray 2 sprays by Nasal route 2 (two) times daily as needed for Rhinitis.    latanoprost 0.005 % ophthalmic solution INSTILL 1 DROP INTO BOTH EYES EVERY DAY    levothyroxine (SYNTHROID) 50 MCG tablet TAKE 1 TABLET BEFORE BREAKFAST DAILY    meclizine (ANTIVERT) 12.5 mg tablet Take 1 tablet (12.5 mg total) by mouth 3 (three) times daily as needed.     "mometasone (ELOCON) 0.1 % ointment Apply topically once daily.    multivitamin capsule Take 1 capsule by mouth once daily.    nitroGLYCERIN (NITROSTAT) 0.4 MG SL tablet PLACE 1 TABLET UNDER THE TONGUE EVERY 5 MINUTES FOR 3 DOSES --IF NOT RELIEVED GO TO THE ER    nystatin-triamcinolone (MYCOLOG II) cream Apply topically 4 (four) times daily.    omeprazole (PRILOSEC) 40 MG capsule Take 1 capsule (40 mg total) by mouth once daily.    rosuvastatin (CRESTOR) 40 MG Tab Take 1 tablet (40 mg total) by mouth every evening.    TENS units Sravanthi 1 application by Misc.(Non-Drug; Combo Route) route daily as needed (pain).    triamcinolone acetonide 0.1% (KENALOG) 0.1 % paste PLACE ONTO TEETH TWICE DAILY           Clinical Reference Information           Your Vitals Were     BP Pulse Height Weight BMI    160/69 59 5' 2.4" (1.585 m) 59.6 kg (131 lb 8 oz) 23.74 kg/m2      Blood Pressure          Most Recent Value    BP  (!)  160/69      Allergies as of 5/3/2017     Ace Inhibitors    Arthrotec 50  [Diclofenac-misoprostol]    Bactrim [Sulfamethoxazole-trimethoprim]    Bentyl [Dicyclomine]    Doxycycline    Imdur [Isosorbide Mononitrate]    Lomotil [Diphenoxylate-atropine]    Tramadol    Vioxx [Rofecoxib]    Lozol [Indapamide]      Immunizations Administered on Date of Encounter - 5/3/2017     None      Language Assistance Services     ATTENTION: Language assistance services are available, free of charge. Please call [phone].      ATENCIÓN: Si femila abbey, tiene a mera disposición servicios gratuitos de asistencia lingüística. Llame al [phone].     NINA Ý: N?u b?n nói Ti?ng Vi?t, có các d?ch v? h? tr? ngôn ng? mi?n phí dành cho b?n. G?i s? [phone].         [facility] - Rheumatology complies with applicable Federal civil rights laws and does not discriminate on the basis of race, color, national origin, age, disability, or sex.        "

## 2017-05-03 NOTE — PROGRESS NOTES
Subjective:        Patient ID: Ewelina Arnold is a 71 y.o. female.     Chief Complaint: Disease Management     HPI   Recent weight loss since 5 months ago- 10lbs, urinating regularly- but states she has proteinuria, and an iron deficiency- states she is often cold. Occasional mouth ulcers- well controlled on kenalog paste. Goes to exercise 3/week. Seeing pain management for sciatica  Continues Plaquenil 200mg daily. Did 24 hour urine collection with Dr Ruffin- CKD III. Interval increase in Cr, continues to FU.     Early 1990's developed acute respiratory failure and spent weeks in ICU [facility]    Positive CARYN, SSB    Hx Rash, leukopenia    On hydroxychloroquine >5 yr; saw eye MD 2/17    Hx:  CAD and hx CHF; s/p PCI; ASA , statin, BB, hydralazine  HTN cont amlodipine, coreg, hydralazine  Pulmonary nodule; saw Dr Logan Sept; no change  Thyroid disease; saw Dr Lemon-R     Review of Systems   Constitutional: Negative for fatigue, fever and unexpected weight change.   HENT: Positive for mouth sores.   Eyes:   Dry    Respiratory: Negative for cough and shortness of breath.   Cardiovascular: Negative for chest pain.   Gastrointestinal: Negative for abdominal pain and diarrhea.   Skin: Negative for rash.   Neurological: Negative for headaches.   Psychiatric/Behavioral: Negative for sleep disturbance.      Objective:      Vitals:    05/03/17 1139   BP: (!) 160/69   Pulse: (!) 59     Physical Exam   Constitutional: She is well-developed, well-nourished, and in no distress.   HENT:   Mouth/Throat: Oropharynx is clear and moist.   Eyes: Conjunctivae are normal.   Cardiovascular:   Murmur heard.  Systolic murmur radiating to neck   Pulmonary/Chest: Breath sounds normal.  Abd: NTND with midline surgical scar.  Lymphadenopathy:   She has no cervical adenopathy.   Neurological: She is alert.   Skin: No rash noted.      Results for EWELINA ARNOLD (MRN [id number]) as of 5/3/2017 12:17   Ref. Range 4/21/2017 07:14   WBC Latest Ref Range: 3.90  - 12.70 K/uL 4.83   RBC Latest Ref Range: 4.00 - 5.40 M/uL 3.99 (L)   Hemoglobin Latest Ref Range: 12.0 - 16.0 g/dL 10.5 (L)   Hematocrit Latest Ref Range: 37.0 - 48.5 % 33.1 (L)   MCV Latest Ref Range: 82 - 98 fL 83   MCH Latest Ref Range: 27.0 - 31.0 pg 26.3 (L)   MCHC Latest Ref Range: 32.0 - 36.0 % 31.7 (L)   RDW Latest Ref Range: 11.5 - 14.5 % 12.9   Platelets Latest Ref Range: 150 - 350 K/uL 194   MPV Latest Ref Range: 9.2 - 12.9 fL 10.4   Gran% Latest Ref Range: 38.0 - 73.0 % 54.9   Gran # Latest Ref Range: 1.8 - 7.7 K/uL 2.7   Lymph% Latest Ref Range: 18.0 - 48.0 % 27.7   Lymph # Latest Ref Range: 1.0 - 4.8 K/uL 1.3   Mono% Latest Ref Range: 4.0 - 15.0 % 13.9   Mono # Latest Ref Range: 0.3 - 1.0 K/uL 0.7   Eosinophil% Latest Ref Range: 0.0 - 8.0 % 2.9   Eos # Latest Ref Range: 0.0 - 0.5 K/uL 0.1   Basophil% Latest Ref Range: 0.0 - 1.9 % 0.4   Baso # Latest Ref Range: 0.00 - 0.20 K/uL 0.02   Sed Rate Latest Ref Range: 0 - 20 mm/Hr 40 (H)   Sodium Latest Ref Range: 136 - 145 mmol/L 139   Potassium Latest Ref Range: 3.5 - 5.1 mmol/L 3.8   Chloride Latest Ref Range: 95 - 110 mmol/L 112 (H)   CO2 Latest Ref Range: 23 - 29 mmol/L 22 (L)   Anion Gap Latest Ref Range: 8 - 16 mmol/L 5 (L)   BUN, Bld Latest Ref Range: 8 - 23 mg/dL 32 (H)   Creatinine Latest Ref Range: 0.5 - 1.4 mg/dL 1.5 (H)   eGFR if non African American Latest Ref Range: >60 mL/min/1.73 m^2 34.8 (A)   eGFR if African American Latest Ref Range: >60 mL/min/1.73 m^2 40.1 (A)   Glucose Latest Ref Range: 70 - 110 mg/dL 88   Calcium Latest Ref Range: 8.7 - 10.5 mg/dL 8.8   Alkaline Phosphatase Latest Ref Range: 55 - 135 U/L 68   Total Protein Latest Ref Range: 6.0 - 8.4 g/dL 7.0   Albumin Latest Ref Range: 3.5 - 5.2 g/dL 3.0 (L)   Total Bilirubin Latest Ref Range: 0.1 - 1.0 mg/dL 0.1   AST Latest Ref Range: 10 - 40 U/L 41 (H)   ALT Latest Ref Range: 10 - 44 U/L 28   CRP Latest Ref Range: 0.0 - 8.2 mg/L <0.1   Vit D, 25-Hydroxy Latest Ref Range: 30 - 96  ng/mL 28 (L)   ds DNA Ab Latest Ref Range: Negative 1:10  Negative 1:10   Complement (C-3) Latest Ref Range: 50 - 180 mg/dL 127   Complement (C-4) Latest Ref Range: 11 - 44 mg/dL 26       Results for DARINEL LOMAS (MRN [id number]) as of 5/3/2017 12:17   Ref. Range 4/21/2017 08:48   Prot/Creat Ratio, Ur Latest Ref Range: 0.00 - 0.20  1.81 (H)   Protein, Urine Random Latest Ref Range: 0 - 15 mg/dL 201 (H)     Results for DARINEL LOMAS (MRN [id number]) as of 5/3/2017 12:17   Ref. Range 4/27/2017 10:12   Urine Protein, Timed Latest Ref Range: 0 - 100 mg/Spec 956 (H)     Results for DARINEL LOMAS (MRN [id number]) as of 5/3/2017 12:17   Ref. Range 4/21/2017 08:48 4/27/2017 10:12   Creatinine, Random Ur Latest Ref Range: 15.0 - 325.0 mg/dL 111.0    Protein, Urine Latest Ref Range: 0 - 15 mg/dL  78 (H)   Urine Collection Duration Latest Units: Hr  24     Urine Volume mL 1225   Urine Collection Duration Hr 24   Protein, Urine 0 - 15 mg/dL 78 (H)   Urine Protein, Timed 0 - 100 mg/Spec 956 (H)     Upper CHRISTOPH endoscopy   Impression:             - Normal examined duodenum.  - Congested, erythematous and eroded mucosa in the greater curvature of the gastric body and antrum. Biopsied.  - Normal gastric fundus, lesser curvature of the stomach, incisura and pylorus.  - Medium-sized hiatus hernia.   - Normal cricopharyngeus, upper third of esophagus and middle third of esophagus.  - Five biopsies were obtained in the duodenal bulb and in the 3rd part of the duodenum.    Assessment:       1. Lupus (systemic lupus erythematosus)    2. Other decreased white blood cell (WBC) count    3. Osteopenia    4. Spondylosis of cervical region without myelopathy or radiculopathy    5.     Anemia- unspecified (ACD vs Renal/EPO)      Lupus is currently controlled on hydroxychloroquine. She has mild anemia and sedimentation rate to 60 that may be due to lupus.   But no other organ specific evidence for disease activity     Tolerating hydroxychloroquine without  adverse effects after more than 5 years of chronic use- Seeing ophthalmology again in Aug again in      Lumbar spondylosis with active sciatica causing pain  Plan:   1. Continue hydroxychloroquine 200 mg twice daily  2. Recheck lupus studies in 6 months with follow-up at that time  3. Continue follow-up with other physicians  4. Long discussion of diet, nutrition and healthy lifestyle as well as patient education of SLE and Labs.

## 2017-05-03 NOTE — PROGRESS NOTES
SLE stable with some persistent proteinuria followed by renal  Chronic anemia, CKD vs inflammation  Lupus labs ok other than proteinuria  Cont HcQ   RTC me 6 mo with lab

## 2017-05-08 ENCOUNTER — NURSE TRIAGE (OUTPATIENT)
Dept: ADMINISTRATIVE | Facility: CLINIC | Age: 72
End: 2017-05-08

## 2017-05-09 RX ORDER — OMEPRAZOLE 40 MG/1
40 CAPSULE, DELAYED RELEASE ORAL DAILY
Qty: 90 CAPSULE | Refills: 3 | Status: ON HOLD | OUTPATIENT
Start: 2017-05-09 | End: 2018-03-28 | Stop reason: HOSPADM
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
## 2017-05-16 ENCOUNTER — OFFICE VISIT (OUTPATIENT)
Dept: INTERNAL MEDICINE | Facility: CLINIC | Age: 72
End: 2017-05-16
Payer: MEDICARE

## 2017-05-16 ENCOUNTER — OFFICE VISIT (OUTPATIENT)
Dept: CARDIOLOGY | Facility: CLINIC | Age: 72
End: 2017-05-16
Payer: MEDICARE

## 2017-05-16 ENCOUNTER — TELEPHONE (OUTPATIENT)
Dept: FAMILY MEDICINE | Facility: CLINIC | Age: 72
End: 2017-05-16

## 2017-05-16 VITALS
HEART RATE: 60 BPM | SYSTOLIC BLOOD PRESSURE: 142 MMHG | WEIGHT: 129.19 LBS | BODY MASS INDEX: 23.77 KG/M2 | DIASTOLIC BLOOD PRESSURE: 70 MMHG | HEIGHT: 62 IN

## 2017-05-16 VITALS
DIASTOLIC BLOOD PRESSURE: 72 MMHG | BODY MASS INDEX: 21.67 KG/M2 | WEIGHT: 130.06 LBS | SYSTOLIC BLOOD PRESSURE: 160 MMHG | HEART RATE: 58 BPM | HEIGHT: 65 IN

## 2017-05-16 DIAGNOSIS — I25.83 CORONARY ARTERY DISEASE DUE TO LIPID RICH PLAQUE: Chronic | ICD-10-CM

## 2017-05-16 DIAGNOSIS — I27.20 PULMONARY HYPERTENSION: ICD-10-CM

## 2017-05-16 DIAGNOSIS — I25.83 CORONARY ARTERY DISEASE DUE TO LIPID RICH PLAQUE: ICD-10-CM

## 2017-05-16 DIAGNOSIS — H40.243 RESIDUAL STAGE OF ANGLE-CLOSURE GLAUCOMA OF BOTH EYES: ICD-10-CM

## 2017-05-16 DIAGNOSIS — I51.89 LEFT VENTRICULAR DIASTOLIC DYSFUNCTION WITH PRESERVED SYSTOLIC FUNCTION: ICD-10-CM

## 2017-05-16 DIAGNOSIS — Z98.61 POST PTCA: ICD-10-CM

## 2017-05-16 DIAGNOSIS — N18.30 CKD (CHRONIC KIDNEY DISEASE) STAGE 3, GFR 30-59 ML/MIN: ICD-10-CM

## 2017-05-16 DIAGNOSIS — I70.0 AORTIC ATHEROSCLEROSIS: ICD-10-CM

## 2017-05-16 DIAGNOSIS — I77.9 LEFT-SIDED CAROTID ARTERY DISEASE: ICD-10-CM

## 2017-05-16 DIAGNOSIS — M85.80 OSTEOPENIA, UNSPECIFIED LOCATION: Chronic | ICD-10-CM

## 2017-05-16 DIAGNOSIS — I73.9 PERIPHERAL ARTERIAL DISEASE: ICD-10-CM

## 2017-05-16 DIAGNOSIS — E78.5 HYPERLIPIDEMIA, UNSPECIFIED HYPERLIPIDEMIA TYPE: ICD-10-CM

## 2017-05-16 DIAGNOSIS — Z85.038 HISTORY OF COLON CANCER: ICD-10-CM

## 2017-05-16 DIAGNOSIS — Z00.00 ENCOUNTER FOR PREVENTIVE HEALTH EXAMINATION: Primary | ICD-10-CM

## 2017-05-16 DIAGNOSIS — I77.1 TORTUOUS AORTA: ICD-10-CM

## 2017-05-16 DIAGNOSIS — I10 ESSENTIAL HYPERTENSION: Primary | ICD-10-CM

## 2017-05-16 DIAGNOSIS — I12.9 HYPERTENSIVE KIDNEY DISEASE WITH CHRONIC KIDNEY DISEASE STAGE III: ICD-10-CM

## 2017-05-16 DIAGNOSIS — I25.10 CORONARY ARTERY DISEASE INVOLVING NATIVE CORONARY ARTERY OF NATIVE HEART WITHOUT ANGINA PECTORIS: ICD-10-CM

## 2017-05-16 DIAGNOSIS — M47.812 CERVICAL SPINE ARTHRITIS: ICD-10-CM

## 2017-05-16 DIAGNOSIS — M32.9 SYSTEMIC LUPUS ERYTHEMATOSUS, UNSPECIFIED SLE TYPE, UNSPECIFIED ORGAN INVOLVEMENT STATUS: Chronic | ICD-10-CM

## 2017-05-16 DIAGNOSIS — N18.30 HYPERTENSIVE KIDNEY DISEASE WITH CHRONIC KIDNEY DISEASE STAGE III: ICD-10-CM

## 2017-05-16 DIAGNOSIS — Z98.61 POST PTCA: Chronic | ICD-10-CM

## 2017-05-16 DIAGNOSIS — M51.37 DEGENERATION OF LUMBAR OR LUMBOSACRAL INTERVERTEBRAL DISC: ICD-10-CM

## 2017-05-16 DIAGNOSIS — I27.9 PULMONARY HEART DISEASE: ICD-10-CM

## 2017-05-16 DIAGNOSIS — F32.5 MAJOR DEPRESSIVE DISORDER WITH SINGLE EPISODE, IN FULL REMISSION: ICD-10-CM

## 2017-05-16 DIAGNOSIS — I77.9 BILATERAL CAROTID ARTERY DISEASE: ICD-10-CM

## 2017-05-16 DIAGNOSIS — E89.0 POSTSURGICAL HYPOTHYROIDISM: ICD-10-CM

## 2017-05-16 DIAGNOSIS — I35.0 MILD AORTIC STENOSIS: ICD-10-CM

## 2017-05-16 DIAGNOSIS — I25.10 CORONARY ARTERY DISEASE DUE TO LIPID RICH PLAQUE: ICD-10-CM

## 2017-05-16 DIAGNOSIS — N18.30 STAGE 3 CHRONIC KIDNEY DISEASE: ICD-10-CM

## 2017-05-16 DIAGNOSIS — E46 MALNUTRITION: ICD-10-CM

## 2017-05-16 DIAGNOSIS — N25.81 SECONDARY HYPERPARATHYROIDISM OF RENAL ORIGIN: ICD-10-CM

## 2017-05-16 DIAGNOSIS — I35.0 AORTIC STENOSIS, MILD: ICD-10-CM

## 2017-05-16 DIAGNOSIS — I25.10 CORONARY ARTERY DISEASE DUE TO LIPID RICH PLAQUE: Chronic | ICD-10-CM

## 2017-05-16 PROBLEM — N18.4 CKD (CHRONIC KIDNEY DISEASE), STAGE IV: Status: RESOLVED | Noted: 2017-01-23 | Resolved: 2017-05-16

## 2017-05-16 PROCEDURE — 1159F MED LIST DOCD IN RCRD: CPT | Mod: S$GLB,,, | Performed by: INTERNAL MEDICINE

## 2017-05-16 PROCEDURE — 99215 OFFICE O/P EST HI 40 MIN: CPT | Mod: S$GLB,,, | Performed by: INTERNAL MEDICINE

## 2017-05-16 PROCEDURE — 99999 PR PBB SHADOW E&M-EST. PATIENT-LVL V: CPT | Mod: PBBFAC,,, | Performed by: NURSE PRACTITIONER

## 2017-05-16 PROCEDURE — 99499 UNLISTED E&M SERVICE: CPT | Mod: S$GLB,,, | Performed by: NURSE PRACTITIONER

## 2017-05-16 PROCEDURE — 1126F AMNT PAIN NOTED NONE PRSNT: CPT | Mod: S$GLB,,, | Performed by: INTERNAL MEDICINE

## 2017-05-16 PROCEDURE — 3078F DIAST BP <80 MM HG: CPT | Mod: S$GLB,,, | Performed by: INTERNAL MEDICINE

## 2017-05-16 PROCEDURE — 99499 UNLISTED E&M SERVICE: CPT | Mod: S$GLB,,, | Performed by: INTERNAL MEDICINE

## 2017-05-16 PROCEDURE — G0439 PPPS, SUBSEQ VISIT: HCPCS | Mod: S$GLB,,, | Performed by: NURSE PRACTITIONER

## 2017-05-16 PROCEDURE — 99999 PR PBB SHADOW E&M-EST. PATIENT-LVL III: CPT | Mod: PBBFAC,,, | Performed by: INTERNAL MEDICINE

## 2017-05-16 PROCEDURE — 3077F SYST BP >= 140 MM HG: CPT | Mod: S$GLB,,, | Performed by: NURSE PRACTITIONER

## 2017-05-16 PROCEDURE — 3077F SYST BP >= 140 MM HG: CPT | Mod: S$GLB,,, | Performed by: INTERNAL MEDICINE

## 2017-05-16 PROCEDURE — 3078F DIAST BP <80 MM HG: CPT | Mod: S$GLB,,, | Performed by: NURSE PRACTITIONER

## 2017-05-16 PROCEDURE — 1160F RVW MEDS BY RX/DR IN RCRD: CPT | Mod: S$GLB,,, | Performed by: INTERNAL MEDICINE

## 2017-05-16 RX ORDER — HYDROCHLOROTHIAZIDE 25 MG/1
25 TABLET ORAL DAILY
Qty: 90 TABLET | Refills: 3 | Status: SHIPPED | OUTPATIENT
Start: 2017-05-16 | End: 2017-05-16 | Stop reason: SDUPTHER

## 2017-05-16 RX ORDER — ACETAMINOPHEN 500 MG
500 TABLET ORAL EVERY 6 HOURS PRN
COMMUNITY
End: 2018-01-31 | Stop reason: SDUPTHER

## 2017-05-16 RX ORDER — HYDROCHLOROTHIAZIDE 25 MG/1
25 TABLET ORAL DAILY
Qty: 90 TABLET | Refills: 3 | Status: ON HOLD | OUTPATIENT
Start: 2017-05-16 | End: 2017-11-19 | Stop reason: HOSPADM

## 2017-05-16 NOTE — PROGRESS NOTES
"Ewelina Arnold presented for a  Medicare AWV and comprehensive Health Risk Assessment today. The following components were reviewed and updated:    · Medical history  · Family History  · Social history  · Allergies and Current Medications  · Health Risk Assessment  · Health Maintenance  · Care Team     ** See Completed Assessments for Annual Wellness Visit within the encounter summary.**       The following assessments were completed:  · Living Situation  · CAGE  · Depression Screening  · Timed Get Up and Go  · Whisper Test  · Cognitive Function Screening  ·   ·   ·   · Nutrition Screening  · ADL Screening  · PAQ Screening    Vitals:    05/16/17 0854   BP: (!) 142/70   BP Location: Left arm   Pulse: 60   Weight: 58.6 kg (129 lb 3 oz)   Height: 5' 2.4" (1.585 m)     Body mass index is 23.33 kg/(m^2).  Physical Exam   Constitutional: She is oriented to person, place, and time. She appears well-developed.   HENT:   Head: Normocephalic.   Cardiovascular: Normal rate and regular rhythm.    Murmur heard.  Pulmonary/Chest: Effort normal and breath sounds normal. She has no wheezes. She has no rales.   Abdominal: Soft. Bowel sounds are normal. She exhibits no mass.   Musculoskeletal: Normal range of motion. She exhibits no edema.   Neurological: She is alert and oriented to person, place, and time. She exhibits normal muscle tone.   Skin: Skin is warm and dry.   Psychiatric: She has a normal mood and affect.   Nursing note and vitals reviewed.        Diagnoses and health risks identified today and associated recommendations/orders:    1. Encounter for preventive health examination  Here for Health Risk Assessment. Follow up in one year.    2. Tortuous aorta  Chronic, stable on current medications. Noted on CXR 7/05/16.  Followed by PCP.    3. Aortic atherosclerosis  Chronic, stable on current medications. Noted on CXR 7/05/16. Followed by PCP.    4. Left ventricular diastolic dysfunction with preserved systolic " function  Chronic, stable on current medications. Followed by Cardiology, PCP.    5. Coronary artery disease due to lipid rich plaque  Chronic, stable on current medications. Followed by Cardiology.    6. Post PTCA  Stable on current medications. Followed by Cardiology.    7. Bilateral carotid artery disease  Chronic, stable on current medications. 20-39% stenosis bilaterally noted on Ultrasound 12/190/13. Followed by PCP, Cardiology.    8. Pulmonary heart disease  Chronic, stable. Noted on ECHO 3/12/16.  Followed by Cardiology, PCP.    9. Peripheral arterial disease  Chronic, stable on current medications. Followed by PCP, Cardiology.    10. Hypertensive kidney disease with chronic kidney disease stage III  Chronic, stable on current medications. Followed by Nephrology, PCP.    11. Stage 3 chronic kidney disease  Chronic, stable. Followed by Nephrology, PCP.    12. Secondary hyperparathyroidism of renal origin  Chronic, stable on current medications. Followed by PCP.    13. Hyperlipidemia, unspecified hyperlipidemia type  Chronic, stable on current medications. Followed by PCP, Cardiology.    14. Major depressive disorder with single episode, in full remission  Chronic, stable on current medication. PHQ-2 score 0. Followed by PCP.    15. Osteopenia, unspecified location  Chronic, stable. Followed by PCP.    16. Systemic lupus erythematosus, unspecified SLE type, unspecified organ involvement status  Chronic, stable on current medication. Followed by Rheumatology.    17. Cervical spine arthritis  Chronic, stable. Noted on  XR Cervical Spine 6/13/12.Followed by Rheumatology, PCP.    18. Degeneration of lumbar or lumbosacral intervertebral disc  Chronic, stable on current medications. Followed by Rheumatology, PCP.    19. Residual stage of angle-closure glaucoma of both eyes  Chronic, stable on current medications. Followed by Ophthalmology.    20. History of colon cancer  Stable. Followed by Gastroenterology.    21.  Malnutrition  Chronic. Albumin 3.0. Reports poor appetite, diarrhea, weight loss.Weight decrased 8 pounds from HRA 7/2016.  Followed by PCP, Gastroenterology, Dietician.    22. Postsurgical hypothyroidism  Chronic, stable on current medication. Followed by Endocrinology, PCP.      Provided Ewelina with a 5-10 year written screening schedule and personal prevention plan. Recommendations were developed using the USPSTF age appropriate recommendations. Education, counseling, and referrals were provided as needed. After Visit Summary printed and given to patient which includes a list of additional screenings\tests needed.    Return in about 11 months (around 4/1/2018).with PCP    Beatriz Jenkins NP

## 2017-05-16 NOTE — PROGRESS NOTES
Subjective:   Patient ID:  Ewelina Arnold is a 71 y.o. female who presents for follow-up of Hyperlipidemia (6 month f/u )    Prior:   Ewelina Arnold is a 70 y.o. female who presents for follow-up of Hypertension. Patient is s/p PCI LAD 2011 (other coronaries were normal). Nl. Lv function and prior stress test is negative.   Patient sees Dr. Feng is coming here because she Has had episodes of severe HTN with BP > 180, 190 a couple of episodes.    She denies any cardiac symptoms and workout at [employer] 23 x a week. Patient has 1 kidney and ACE has increased Cr in the past.   Started hydralazine and BP still elevated did not tolerate clonidine, Kidney function unchanged.    Patient was in the ER for SOB and was found to be volume overloaded and was diuresed BP in 150s.   Patient walks on the treadmill 3 x /week (10-15 min), no chest pain or severely SOB.   Echo: Nl LV function, PA pressure 47 mmHg, mild AS.  Stress test: negative for ischemia. 9 METS, failure to achieve HR, prior nuclear stress test is negative for ischemia.       HPI:   BP diary shows significantly controlled BP readings mostly in 130s and 120s.  Otherwise doing well.     Patient Active Problem List   Diagnosis    Lupus (systemic lupus erythematosus)    CAD (coronary artery disease)    Post PTCA    High risk medication use - Both Eyes    Residual stage angle-closure glaucoma - Both Eyes    Nuclear sclerosis - Both Eyes    Thyroid nodule    Postsurgical hypothyroidism    Osteopenia    Insufficiency of tear film of both eyes    Aortic atherosclerosis    Left ventricular diastolic dysfunction with preserved systolic function    GERD (gastroesophageal reflux disease)    History of colon cancer    Hyperlipidemia    Degeneration of lumbar or lumbosacral intervertebral disc    Hypertensive kidney disease with chronic kidney disease stage III    Tortuous aorta    Positive dysphotopsia    Weight loss, abnormal    Pulmonary heart  "disease    Secondary hyperparathyroidism of renal origin    Peripheral arterial disease    Major depressive disorder with single episode, in full remission    Cervical spine arthritis    Malnutrition    Bilateral carotid artery disease    Stage 3 chronic kidney disease     BP (!) 160/72 (BP Location: Left arm, Patient Position: Sitting, BP Method: Automatic)  Pulse (!) 58  Ht 5' 5" (1.651 m)  Wt 59 kg (130 lb 1.1 oz)  BMI 21.64 kg/m2  Body mass index is 21.64 kg/(m^2).  CrCl cannot be calculated (Patient has no serum creatinine result on file.).    Lab Results   Component Value Date     04/21/2017    K 3.8 04/21/2017     (H) 04/21/2017    CO2 22 (L) 04/21/2017    BUN 32 (H) 04/21/2017    CREATININE 1.5 (H) 04/21/2017    GLU 88 04/21/2017    HGBA1C 5.7 01/24/2017    MG 1.8 03/12/2016    AST 41 (H) 04/21/2017    ALT 28 04/21/2017    ALBUMIN 3.0 (L) 04/21/2017    PROT 7.0 04/21/2017    BILITOT 0.1 04/21/2017    WBC 4.83 04/21/2017    WBC 4.83 04/21/2017    HGB 10.5 (L) 04/21/2017    HGB 10.5 (L) 04/21/2017    HCT 33.1 (L) 04/21/2017    HCT 33.1 (L) 04/21/2017    MCV 83 04/21/2017    MCV 83 04/21/2017     04/21/2017     04/21/2017    INR 1.1 05/09/2013    TSH 0.935 03/23/2017    CHOL 123 03/23/2017    HDL 39 (L) 03/23/2017    LDLCALC 68.2 03/23/2017    TRIG 79 03/23/2017       Current Outpatient Prescriptions   Medication Sig    acetaminophen (TYLENOL) 500 MG tablet Take 500 mg by mouth every 6 (six) hours as needed for Pain.    amlodipine (NORVASC) 10 MG tablet TAKE 1 TABLET EVERY DAY    ammonium lactate 12 % Crea Apply 1 application topically 2 (two) times daily as needed.    aspirin (ECOTRIN) 81 MG EC tablet Take 1 tablet (81 mg total) by mouth once daily. (Patient taking differently: Take 81 mg by mouth once daily. 1 tablet every other day)    betamethasone dipropionate (DIPROLENE) 0.05 % cream Apply topically 2 (two) times daily as needed.    butalbital-aspirin-caffeine " -40 mg (FIORINAL) -40 mg Cap Take 1 capsule by mouth every 6 (six) hours as needed.     carvedilol (COREG) 12.5 MG tablet TAKE 1 TABLET TWICE DAILY WITH MEALS    cholestyramine (QUESTRAN) 4 gram packet Take 1 packet (4 g total) by mouth 3 (three) times daily with meals.    citalopram (CELEXA) 10 MG tablet TAKE 1 TABLET EVERY DAY    conjugated estrogens (PREMARIN) vaginal cream Use 1g nightly for two weeks, then 1g twice per week.    ergocalciferol (ERGOCALCIFEROL) 50,000 unit Cap Take 1 capsule (50,000 Units total) by mouth every 30 days.    ferrous gluconate (FERGON) 325 MG Tab Take 1 tablet (325 mg total) by mouth daily with breakfast.    fexofenadine (ALLEGRA) 180 MG tablet Take 1 tablet (180 mg total) by mouth once daily.    gentamicin-prednisoLONE (PRED G) 0.3-1 % ophthalmic drops 2 drops to the affected ear 3 times a day for 5 days.  Please let patient know that ophthalmic drops can be used in the ear.    hydrALAZINE (APRESOLINE) 10 MG tablet TAKE 2 TABLETS EVERY 12 HOURS    hydroxychloroquine (PLAQUENIL) 200 mg tablet TAKE 1 TABLET TWICE DAILY    hyoscyamine (LEVSIN/SL) 0.125 mg Subl Place 1 tablet (0.125 mg total) under the tongue 2 (two) times daily as needed.    ipratropium (ATROVENT) 0.03 % nasal spray 2 sprays by Nasal route 2 (two) times daily as needed for Rhinitis.    latanoprost 0.005 % ophthalmic solution INSTILL 1 DROP INTO BOTH EYES EVERY DAY    levothyroxine (SYNTHROID) 50 MCG tablet TAKE 1 TABLET BEFORE BREAKFAST DAILY    meclizine (ANTIVERT) 12.5 mg tablet Take 1 tablet (12.5 mg total) by mouth 3 (three) times daily as needed.    mometasone (ELOCON) 0.1 % ointment Apply topically once daily.    multivitamin capsule Take 1 capsule by mouth once daily.    nitroGLYCERIN (NITROSTAT) 0.4 MG SL tablet PLACE 1 TABLET UNDER THE TONGUE EVERY 5 MINUTES FOR 3 DOSES --IF NOT RELIEVED GO TO THE ER    nystatin-triamcinolone (MYCOLOG II) cream Apply topically 4 (four) times  daily.    omeprazole (PRILOSEC) 40 MG capsule Take 1 capsule (40 mg total) by mouth once daily.    rosuvastatin (CRESTOR) 40 MG Tab Take 1 tablet (40 mg total) by mouth every evening.    TENS units Sravanthi 1 application by Misc.(Non-Drug; Combo Route) route daily as needed (pain).    triamcinolone acetonide 0.1% (KENALOG) 0.1 % paste PLACE ONTO TEETH TWICE DAILY (Patient taking differently: PLACE ONTO TEETH PRN)    hydrochlorothiazide (HYDRODIURIL) 25 MG tablet Take 1 tablet (25 mg total) by mouth once daily.     No current facility-administered medications for this visit.        Review of Systems   Unable to perform ROS  Constitution: Negative for decreased appetite, fever, weakness, malaise/fatigue, weight gain and weight loss.   HENT: Positive for headaches.    Eyes: Negative.  Negative for blurred vision, double vision and visual disturbance.   Cardiovascular: Negative for chest pain, claudication, cyanosis, dyspnea on exertion, irregular heartbeat, leg swelling, near-syncope, orthopnea, palpitations, paroxysmal nocturnal dyspnea and syncope.   Respiratory: Negative.  Negative for cough, hemoptysis, shortness of breath, sleep disturbances due to breathing, snoring and wheezing.    Endocrine: Negative.  Negative for cold intolerance and heat intolerance.   Hematologic/Lymphatic: Bruises/bleeds easily.   Skin: Negative.    Musculoskeletal: Positive for joint pain. Negative for muscle weakness and myalgias.   Gastrointestinal: Negative for abdominal pain, constipation and diarrhea.   Genitourinary: Negative for bladder incontinence.   Neurological: Negative for difficulty with concentration, dizziness, light-headedness, loss of balance and numbness.   Psychiatric/Behavioral: Negative for depression.       Objective:   Physical Exam   Constitutional: She is oriented to person, place, and time. She appears well-developed and well-nourished. No distress.   HENT:   Head: Normocephalic and atraumatic.   Nose: Nose  normal.   Mouth/Throat: Oropharynx is clear and moist. No oropharyngeal exudate.   Eyes: Conjunctivae and EOM are normal. Pupils are equal, round, and reactive to light. Right eye exhibits no discharge. Left eye exhibits no discharge. No scleral icterus.   Neck: Normal range of motion. Neck supple. No JVD present. No tracheal deviation present. No thyromegaly present.   Cardiovascular: Normal rate, regular rhythm, normal heart sounds and intact distal pulses.  Exam reveals no gallop and no friction rub.    No murmur heard.  Pulmonary/Chest: Effort normal and breath sounds normal. No stridor. No respiratory distress. She has no wheezes. She has no rales. She exhibits no tenderness.   Abdominal: Soft. Bowel sounds are normal. She exhibits no distension and no mass. There is no tenderness. There is no rebound and no guarding.   Musculoskeletal: Normal range of motion. She exhibits no edema or tenderness.   Lymphadenopathy:     She has no cervical adenopathy.   Neurological: She is alert and oriented to person, place, and time. She has normal reflexes. No cranial nerve deficit. She exhibits normal muscle tone. Coordination normal.   Skin: Skin is warm. No rash noted. She is not diaphoretic. No erythema. No pallor.   Psychiatric: She has a normal mood and affect. Her behavior is normal. Judgment and thought content normal.       Assessment:     1. Essential hypertension    2. Coronary artery disease due to lipid rich plaque    3. Left-sided carotid artery disease    4. Left ventricular diastolic dysfunction with preserved systolic function    5. Pulmonary hypertension    6. CKD (chronic kidney disease) stage 3, GFR 30-59 ml/min    7. Aortic stenosis, mild    8. Mild aortic stenosis    9. Post PTCA    10. Coronary artery disease involving native coronary artery of native heart without angina pectoris        Plan:   Ewelina was seen today for hyperlipidemia.    Diagnoses and all orders for this visit:    Essential  hypertension  -     2D Echo w/ Color Flow Doppler; Future    Coronary artery disease due to lipid rich plaque    Left-sided carotid artery disease    Left ventricular diastolic dysfunction with preserved systolic function    Pulmonary hypertension    CKD (chronic kidney disease) stage 3, GFR 30-59 ml/min    Aortic stenosis, mild    Mild aortic stenosis  -     2D Echo w/ Color Flow Doppler; Future    Post PTCA    Coronary artery disease involving native coronary artery of native heart without angina pectoris    Other orders  -     Discontinue: hydrochlorothiazide (HYDRODIURIL) 25 MG tablet; Take 1 tablet (25 mg total) by mouth once daily.  -     hydrochlorothiazide (HYDRODIURIL) 25 MG tablet; Take 1 tablet (25 mg total) by mouth once daily.      Continue all other meds.  Patient is compliant with her medications.  All meds reviewed and are appropriate  Counseled on importance of heart healthy diet low in saturated and trans fat and salt as well gradually starting a regular aerobic exercise regimen with goal of 30min 5x/week. Recommend BP diary. Call if systolic BP > 140 mmHg on checking repeatedly      RTC 6 months.

## 2017-05-16 NOTE — MR AVS SNAPSHOT
[facility] - Internal Medicine  [address]  [facility] [address]  Phone: [phone]  Fax: [phone]                  Ewelina Arnold   2017 9:00 AM   Office Visit    Description:  Female : 1945   Provider:  HRA, NOM 3   Department:  [facility] - Internal Medicine           Diagnoses this Visit        Comments    Encounter for preventive health examination    -  Primary            To Do List           Future Appointments        Provider Department Dept Phone    2017 10:30 AM Elham Wyman MD [facility] Cardiology [phone]    2017 9:15 AM THOMAS, VISUAL LAY [facility] Ophthalmology [phone]    2017 10:00 AM Marky Mccray MD [facility] Ophthalmology [phone]      Goals (5 Years of Data)     None      [facility] On Call     [facility] On Call Nurse Care Line -  Assistance  Unless otherwise directed by your provider, please contact [facility], our nurse care line that is available for  assistance.     Registered nurses in the [facility] provide: appointment scheduling, clinical advisement, health education, and other advisory services.  Call: [phone] (toll free)               Medications           Message regarding Medications     Verify the changes and/or additions to your medication regime listed below are the same as discussed with your clinician today.  If any of these changes or additions are incorrect, please notify your healthcare provider.             Verify that the below list of medications is an accurate representation of the medications you are currently taking.  If none reported, the list may be blank. If incorrect, please contact your healthcare provider. Carry this list with you in case of emergency.           Current Medications     acetaminophen (TYLENOL) 500 MG tablet Take 500 mg by mouth every 6 (six) hours as needed for Pain.    amlodipine (NORVASC) 10 MG tablet TAKE 1 TABLET EVERY DAY    ammonium lactate  12 % Crea Apply 1 application topically 2 (two) times daily as needed.    aspirin (ECOTRIN) 81 MG EC tablet Take 1 tablet (81 mg total) by mouth once daily.    betamethasone dipropionate (DIPROLENE) 0.05 % cream Apply topically 2 (two) times daily as needed.    butalbital-aspirin-caffeine -40 mg (FIORINAL) -40 mg Cap Take 1 capsule by mouth every 6 (six) hours as needed.     carvedilol (COREG) 12.5 MG tablet TAKE 1 TABLET TWICE DAILY WITH MEALS    cholestyramine (QUESTRAN) 4 gram packet Take 1 packet (4 g total) by mouth 3 (three) times daily with meals.    citalopram (CELEXA) 10 MG tablet TAKE 1 TABLET EVERY DAY    conjugated estrogens (PREMARIN) vaginal cream Use 1g nightly for two weeks, then 1g twice per week.    ergocalciferol (ERGOCALCIFEROL) 50,000 unit Cap Take 1 capsule (50,000 Units total) by mouth every 30 days.    ferrous gluconate (FERGON) 325 MG Tab Take 1 tablet (325 mg total) by mouth daily with breakfast.    fexofenadine (ALLEGRA) 180 MG tablet Take 1 tablet (180 mg total) by mouth once daily.    furosemide (LASIX) 20 MG tablet TAKE 1 TABLET TWICE DAILY    gentamicin-prednisoLONE (PRED G) 0.3-1 % ophthalmic drops 2 drops to the affected ear 3 times a day for 5 days.  Please let patient know that ophthalmic drops can be used in the ear.    hydrALAZINE (APRESOLINE) 10 MG tablet TAKE 2 TABLETS EVERY 12 HOURS    hydroxychloroquine (PLAQUENIL) 200 mg tablet TAKE 1 TABLET TWICE DAILY    hyoscyamine (LEVSIN/SL) 0.125 mg Subl Place 1 tablet (0.125 mg total) under the tongue 2 (two) times daily as needed.    ipratropium (ATROVENT) 0.03 % nasal spray 2 sprays by Nasal route 2 (two) times daily as needed for Rhinitis.    latanoprost 0.005 % ophthalmic solution INSTILL 1 DROP INTO BOTH EYES EVERY DAY    levothyroxine (SYNTHROID) 50 MCG tablet TAKE 1 TABLET BEFORE BREAKFAST DAILY    meclizine (ANTIVERT) 12.5 mg tablet Take 1 tablet (12.5 mg total) by mouth 3 (three) times daily as needed.     "mometasone (ELOCON) 0.1 % ointment Apply topically once daily.    multivitamin capsule Take 1 capsule by mouth once daily.    nitroGLYCERIN (NITROSTAT) 0.4 MG SL tablet PLACE 1 TABLET UNDER THE TONGUE EVERY 5 MINUTES FOR 3 DOSES --IF NOT RELIEVED GO TO THE ER    nystatin-triamcinolone (MYCOLOG II) cream Apply topically 4 (four) times daily.    omeprazole (PRILOSEC) 40 MG capsule Take 1 capsule (40 mg total) by mouth once daily.    rosuvastatin (CRESTOR) 40 MG Tab Take 1 tablet (40 mg total) by mouth every evening.    TENS units Sravanthi 1 application by Misc.(Non-Drug; Combo Route) route daily as needed (pain).    triamcinolone acetonide 0.1% (KENALOG) 0.1 % paste PLACE ONTO TEETH TWICE DAILY           Clinical Reference Information           Your Vitals Were     BP Pulse Height Weight BMI    142/70 (BP Location: Left arm) 60 5' 2.4" (1.585 m) 58.6 kg (129 lb 3 oz) 23.33 kg/m2      Blood Pressure          Most Recent Value    BP  (!)  142/70      Allergies as of 5/16/2017     Ace Inhibitors    Arthrotec 50  [Diclofenac-misoprostol]    Bactrim [Sulfamethoxazole-trimethoprim]    Bentyl [Dicyclomine]    Doxycycline    Imdur [Isosorbide Mononitrate]    Lomotil [Diphenoxylate-atropine]    Tramadol    Vioxx [Rofecoxib]    Lozol [Indapamide]      Immunizations Administered on Date of Encounter - 5/16/2017     None      Instructions      Counseling and Referral of Other Preventative  (Italic type indicates deductible and co-insurance are waived)    Patient Name: Ewelina Arnold  Today's Date: 5/16/2017      SERVICE LIMITATIONS RECOMMENDATION    Vaccines    · Pneumococcal (once after 65)    · Influenza (annually)    · Hepatitis B (if medium/high risk)    · Prevnar 13      Hepatitis B medium/high risk factors:       - End-stage renal disease       - Hemophiliacs who received Factor VII or         IX concentrates       - Clients of institutions for the mentally             retarded       - Persons who live in the same house as         "  a HepB carrier       - Homosexual men       - Illicit injectable drug abusers     Pneumococcal: Done, no repeat necessary     Influenza: due fall 2017     Hepatitis B: N/A     Prevnar 13: Done, no repeat necessary    Mammogram (biennial age 50-74)  Annually (age 40 or over)  Last done 8/20/16, recommend to repeat every 1  years    Pap (up to age 70 and after 70 if unknown history or abnormal study last 10 years)    N/A     The USPSTF recommends against screening for cervical cancer in women older than age 65 years who have had adequate prior screening and are not otherwise at high risk for cervical cancer.      Colorectal cancer screening (to age 75)    · Fecal occult blood test (annual)  · Flexible sigmoidoscopy (5y)  · Screening colonoscopy (10y)  · Barium enema   Last done 4/2016, recommend to repeat every 5  years    Diabetes self-management training (no USPSTF recommendations)  Requires referral by treating physician for patient with diabetes or renal disease. 10 hours of initial DSMT sessions of no less than 30 minutes each in a continuous 12-month period. 2 hours of follow-up DSMT in subsequent years.  N/A    Bone mass measurements (age 65 & older, biennial)  Requires diagnosis related to osteoporosis or estrogen deficiency. Biennial benefit unless patient has history of long-term glucocorticoid  Last done 1/2016, recommend to repeat every 2-4  years    Glaucoma screening (no USPSTF recommendation)  Diabetes mellitus, family history   , age 50 or over    American, age 65 or over  Done this year, repeat every year    Medical nutrition therapy for diabetes or renal disease (no recommended schedule)  Requires referral by treating physician for patient with diabetes or renal disease or kidney transplant within the past 3 years.  Can be provided in same year as diabetes self-management training (DSMT), and CMS recommends medical nutrition therapy take place after DSMT. Up to 3 hours for  initial year and 2 hours in subsequent years.  seeing Dietician    Cardiovascular screening blood tests (every 5 years)  · Fasting lipid panel  Order as a panel if possible  Done this year, repeat every year    Diabetes screening tests (at least every 3 years, Medicare covers annually or at 6-month intervals for prediabetic patients)  · Fasting blood sugar (FBS) or glucose tolerance test (GTT)  Patient must be diagnosed with one of the following:       - Hypertension       - Dyslipidemia       - Obesity (BMI 30kg/m2)       - Previous elevated impaired FBS or GTT       ... or any two of the following:       - Overweight (BMI 25 but <30)       - Family history of diabetes       - Age 65 or older       - History of gestational diabetes or birth of baby weighing more than 9 pounds  Done this year, repeat every year    Abdominal aortic aneurysm screening (once)  · Sonogram   Limited to patients who meet one of the following criteria:       - Men who are 65-75 years old and have smoked more than 100 cigarette in their lifetime       - Anyone with a family history of abdominal aortic aneurysm       - Anyone recommended for screening by the USPSTF  N/A    HIV screening (annually for increased risk patients)  · HIV-1 and HIV-2 by EIA, or ROBBIE, rapid antibody test or oral mucosa transudate  Patients must be at increased risk for HIV infection per USPSTF guidelines or pregnant. Tests covered annually for patient at increased risk or as requested by the patient. Pregnant patients may receive up to 3 tests during pregnancy.  Risks discussed, screening is not recommended    Smoking cessation counseling (up to 8 sessions per year)  Patients must be asymptomatic of tobacco-related conditions to receive as a preventative service.  Non-smoker    Subsequent annual wellness visit  At least 12 months since last AWV  Return in one year     The following information is provided to all patients.  This information is to help you find  resources for any of the problems found today that may be affecting your health:                Living healthy guide: www.[facility].[place].[facility]      Understanding Diabetes: www.diabetes.org      Eating healthy: www.cdc.gov/healthyweight      CDC home safety checklist: www.cdc.gov/steadi/patient.html      Agency on Aging: www.goea.[place].[facility]      Alcoholics anonymous (AA): www.aa.org      Physical Activity: www.lolis.nih.gov/xi2upzu      Tobacco use: www.quitwithusla.org          Language Assistance Services     ATTENTION: Language assistance services are available, free of charge. Please call [phone].      ATENCIÓN: Si habla español, tiene a mera disposición servicios gratuitos de asistencia lingüística. Llame al [phone].     CHÚ Ý: N?u b?n nói Ti?ng Vi?t, có các d?ch v? h? tr? ngôn ng? mi?n phí dành cho b?n. G?i s? [phone].         [facility] complies with applicable Federal civil rights laws and does not discriminate on the basis of race, color, national origin, age, disability, or sex.

## 2017-05-16 NOTE — MR AVS SNAPSHOT
[facility]  [address]  [facility] [address]  Phone: [phone]                  Ewelina Arnold   2017 10:30 AM   Office Visit    Description:  Female : 1945   Provider:  Elham Wyman MD   Department:  [facility]           Reason for Visit     Hyperlipidemia           Diagnoses this Visit        Comments    Essential hypertension    -  Primary     Coronary artery disease due to lipid rich plaque         Left-sided carotid artery disease         Left ventricular diastolic dysfunction with preserved systolic function         Pulmonary hypertension         CKD (chronic kidney disease) stage 3, GFR 30-59 ml/min         Aortic stenosis, mild         Mild aortic stenosis         Post PTCA         Coronary artery disease involving native coronary artery of native heart without angina pectoris                To Do List           Future Appointments        Provider Department Dept Phone    2017 9:15 AM THOMAS, VISUAL LAY [facility] - Ophthalmology [phone]    2017 10:00 AM Marky Mccray MD [facility] Ophthalmology [phone]      Goals (5 Years of Data)     None      Follow-Up and Disposition     Return in about 8 months (around 2018).       These Medications        Disp Refills Start End    hydrochlorothiazide (HYDRODIURIL) 25 MG tablet 90 tablet 3 2017    Take 1 tablet (25 mg total) by mouth once daily. - Oral    Pharmacy: [facility] - [address] Ph #: [phone]         [facility]     [facility] Nurse Care Line -  Assistance  Unless otherwise directed by your provider, please contact [facility], our nurse care line that is available for  assistance.     Registered nurses in the [facility] provide: appointment scheduling, clinical advisement, health education, and other advisory services.  Call: [phone] (toll free)                Medications           Message regarding Medications     Verify the changes and/or additions to your medication regime listed below are the same as discussed with your clinician today.  If any of these changes or additions are incorrect, please notify your healthcare provider.        START taking these NEW medications        Refills    hydrochlorothiazide (HYDRODIURIL) 25 MG tablet 3    Sig: Take 1 tablet (25 mg total) by mouth once daily.    Class: Normal    Route: Oral      STOP taking these medications     furosemide (LASIX) 20 MG tablet TAKE 1 TABLET TWICE DAILY           Verify that the below list of medications is an accurate representation of the medications you are currently taking.  If none reported, the list may be blank. If incorrect, please contact your healthcare provider. Carry this list with you in case of emergency.           Current Medications     acetaminophen (TYLENOL) 500 MG tablet Take 500 mg by mouth every 6 (six) hours as needed for Pain.    amlodipine (NORVASC) 10 MG tablet TAKE 1 TABLET EVERY DAY    ammonium lactate 12 % Crea Apply 1 application topically 2 (two) times daily as needed.    aspirin (ECOTRIN) 81 MG EC tablet Take 1 tablet (81 mg total) by mouth once daily.    betamethasone dipropionate (DIPROLENE) 0.05 % cream Apply topically 2 (two) times daily as needed.    butalbital-aspirin-caffeine -40 mg (FIORINAL) -40 mg Cap Take 1 capsule by mouth every 6 (six) hours as needed.     carvedilol (COREG) 12.5 MG tablet TAKE 1 TABLET TWICE DAILY WITH MEALS    cholestyramine (QUESTRAN) 4 gram packet Take 1 packet (4 g total) by mouth 3 (three) times daily with meals.    citalopram (CELEXA) 10 MG tablet TAKE 1 TABLET EVERY DAY    conjugated estrogens (PREMARIN) vaginal cream Use 1g nightly for two weeks, then 1g twice per week.    ergocalciferol (ERGOCALCIFEROL) 50,000 unit Cap Take 1 capsule (50,000 Units total) by mouth every 30 days.    ferrous gluconate (FERGON) 325 MG Tab  Take 1 tablet (325 mg total) by mouth daily with breakfast.    fexofenadine (ALLEGRA) 180 MG tablet Take 1 tablet (180 mg total) by mouth once daily.    gentamicin-prednisoLONE (PRED G) 0.3-1 % ophthalmic drops 2 drops to the affected ear 3 times a day for 5 days.  Please let patient know that ophthalmic drops can be used in the ear.    hydrALAZINE (APRESOLINE) 10 MG tablet TAKE 2 TABLETS EVERY 12 HOURS    hydroxychloroquine (PLAQUENIL) 200 mg tablet TAKE 1 TABLET TWICE DAILY    hyoscyamine (LEVSIN/SL) 0.125 mg Subl Place 1 tablet (0.125 mg total) under the tongue 2 (two) times daily as needed.    ipratropium (ATROVENT) 0.03 % nasal spray 2 sprays by Nasal route 2 (two) times daily as needed for Rhinitis.    latanoprost 0.005 % ophthalmic solution INSTILL 1 DROP INTO BOTH EYES EVERY DAY    levothyroxine (SYNTHROID) 50 MCG tablet TAKE 1 TABLET BEFORE BREAKFAST DAILY    meclizine (ANTIVERT) 12.5 mg tablet Take 1 tablet (12.5 mg total) by mouth 3 (three) times daily as needed.    mometasone (ELOCON) 0.1 % ointment Apply topically once daily.    multivitamin capsule Take 1 capsule by mouth once daily.    nitroGLYCERIN (NITROSTAT) 0.4 MG SL tablet PLACE 1 TABLET UNDER THE TONGUE EVERY 5 MINUTES FOR 3 DOSES --IF NOT RELIEVED GO TO THE ER    nystatin-triamcinolone (MYCOLOG II) cream Apply topically 4 (four) times daily.    omeprazole (PRILOSEC) 40 MG capsule Take 1 capsule (40 mg total) by mouth once daily.    rosuvastatin (CRESTOR) 40 MG Tab Take 1 tablet (40 mg total) by mouth every evening.    TENS units Sravanthi 1 application by Misc.(Non-Drug; Combo Route) route daily as needed (pain).    triamcinolone acetonide 0.1% (KENALOG) 0.1 % paste PLACE ONTO TEETH TWICE DAILY    hydrochlorothiazide (HYDRODIURIL) 25 MG tablet Take 1 tablet (25 mg total) by mouth once daily.           Clinical Reference Information           Your Vitals Were     BP Pulse Height Weight BMI    160/72 (BP Location: Left arm, Patient Position: Sitting,  "BP Method: Automatic) 58 5' 5" (1.651 m) 59 kg (130 lb 1.1 oz) 21.64 kg/m2      Blood Pressure          Most Recent Value    Right Arm BP - Sitting  161/76    Left Arm BP - Sitting  160/72    BP  (!)  160/72      Allergies as of 5/16/2017     Ace Inhibitors    Arthrotec 50  [Diclofenac-misoprostol]    Bactrim [Sulfamethoxazole-trimethoprim]    Bentyl [Dicyclomine]    Doxycycline    Imdur [Isosorbide Mononitrate]    Lomotil [Diphenoxylate-atropine]    Tramadol    Vioxx [Rofecoxib]    Lozol [Indapamide]      Immunizations Administered on Date of Encounter - 5/16/2017     None      Orders Placed During Today's Visit     Future Labs/Procedures Expected by Expires    2D Echo w/ Color Flow Doppler  As directed 5/16/2018      Language Assistance Services     ATTENTION: Language assistance services are available, free of charge. Please call [phone].      ATENCIÓN: Si habla abbey, tiene a mera disposición servicios gratuitos de asistencia lingüística. Llame al [phone].     NINA Ý: N?u b?n nói Ti?ng Vi?t, có các d?ch v? h? tr? ngôn ng? mi?n phí dành cho b?n. G?i s? [phone].         [facility] complies with applicable Federal civil rights laws and does not discriminate on the basis of race, color, national origin, age, disability, or sex.        "

## 2017-05-16 NOTE — PATIENT INSTRUCTIONS
Counseling and Referral of Other Preventative  (Italic type indicates deductible and co-insurance are waived)    Patient Name: Ewelina Arnold  Today's Date: 5/16/2017      SERVICE LIMITATIONS RECOMMENDATION    Vaccines    · Pneumococcal (once after 65)    · Influenza (annually)    · Hepatitis B (if medium/high risk)    · Prevnar 13      Hepatitis B medium/high risk factors:       - End-stage renal disease       - Hemophiliacs who received Factor VII or         IX concentrates       - Clients of institutions for the mentally             retarded       - Persons who live in the same house as          a HepB carrier       - Homosexual men       - Illicit injectable drug abusers     Pneumococcal: Done, no repeat necessary     Influenza: due fall 2017     Hepatitis B: N/A     Prevnar 13: Done, no repeat necessary    Mammogram (biennial age 50-74)  Annually (age 40 or over)  Last done 8/20/16, recommend to repeat every 1  years    Pap (up to age 70 and after 70 if unknown history or abnormal study last 10 years)    N/A     The USPSTF recommends against screening for cervical cancer in women older than age 65 years who have had adequate prior screening and are not otherwise at high risk for cervical cancer.      Colorectal cancer screening (to age 75)    · Fecal occult blood test (annual)  · Flexible sigmoidoscopy (5y)  · Screening colonoscopy (10y)  · Barium enema   Last done 4/2016, recommend to repeat every 5  years    Diabetes self-management training (no USPSTF recommendations)  Requires referral by treating physician for patient with diabetes or renal disease. 10 hours of initial DSMT sessions of no less than 30 minutes each in a continuous 12-month period. 2 hours of follow-up DSMT in subsequent years.  N/A    Bone mass measurements (age 65 & older, biennial)  Requires diagnosis related to osteoporosis or estrogen deficiency. Biennial benefit unless patient has history of long-term glucocorticoid  Last done 1/2016,  recommend to repeat every 2-4  years    Glaucoma screening (no USPSTF recommendation)  Diabetes mellitus, family history   , age 50 or over    American, age 65 or over  Done this year, repeat every year    Medical nutrition therapy for diabetes or renal disease (no recommended schedule)  Requires referral by treating physician for patient with diabetes or renal disease or kidney transplant within the past 3 years.  Can be provided in same year as diabetes self-management training (DSMT), and CMS recommends medical nutrition therapy take place after DSMT. Up to 3 hours for initial year and 2 hours in subsequent years.  seeing Dietician    Cardiovascular screening blood tests (every 5 years)  · Fasting lipid panel  Order as a panel if possible  Done this year, repeat every year    Diabetes screening tests (at least every 3 years, Medicare covers annually or at 6-month intervals for prediabetic patients)  · Fasting blood sugar (FBS) or glucose tolerance test (GTT)  Patient must be diagnosed with one of the following:       - Hypertension       - Dyslipidemia       - Obesity (BMI 30kg/m2)       - Previous elevated impaired FBS or GTT       ... or any two of the following:       - Overweight (BMI 25 but <30)       - Family history of diabetes       - Age 65 or older       - History of gestational diabetes or birth of baby weighing more than 9 pounds  Done this year, repeat every year    Abdominal aortic aneurysm screening (once)  · Sonogram   Limited to patients who meet one of the following criteria:       - Men who are 65-75 years old and have smoked more than 100 cigarette in their lifetime       - Anyone with a family history of abdominal aortic aneurysm       - Anyone recommended for screening by the USPSTF  N/A    HIV screening (annually for increased risk patients)  · HIV-1 and HIV-2 by EIA, or ROBBIE, rapid antibody test or oral mucosa transudate  Patients must be at increased risk for HIV  infection per USPSTF guidelines or pregnant. Tests covered annually for patient at increased risk or as requested by the patient. Pregnant patients may receive up to 3 tests during pregnancy.  Risks discussed, screening is not recommended    Smoking cessation counseling (up to 8 sessions per year)  Patients must be asymptomatic of tobacco-related conditions to receive as a preventative service.  Non-smoker    Subsequent annual wellness visit  At least 12 months since last AWV  Return in one year     The following information is provided to all patients.  This information is to help you find resources for any of the problems found today that may be affecting your health:                Living healthy guide: www.[facility].[place].[facility]      Understanding Diabetes: www.diabetes.org      Eating healthy: www.cdc.gov/healthyweight      CDC home safety checklist: www.cdc.gov/steadi/patient.html      Agency on Aging: www.goea.[place].[facility]      Alcoholics anonymous (AA): www.aa.org      Physical Activity: www.lolis.nih.gov/vw0ttyl      Tobacco use: www.quitwithusla.org

## 2017-05-26 ENCOUNTER — HOSPITAL ENCOUNTER (OUTPATIENT)
Dept: CARDIOLOGY | Facility: CLINIC | Age: 72
Discharge: HOME | End: 2017-05-26
Payer: MEDICARE

## 2017-05-26 DIAGNOSIS — I35.0 MILD AORTIC STENOSIS: ICD-10-CM

## 2017-05-26 DIAGNOSIS — I10 ESSENTIAL HYPERTENSION: ICD-10-CM

## 2017-05-26 LAB
AORTIC VALVE REGURGITATION: ABNORMAL
AORTIC VALVE STENOSIS: ABNORMAL
DIASTOLIC DYSFUNCTION: YES
ESTIMATED PA SYSTOLIC PRESSURE: 30.04
MITRAL VALVE REGURGITATION: ABNORMAL
RETIRED EF AND QEF - SEE NOTES: 68 (ref 55–65)
TRICUSPID VALVE REGURGITATION: ABNORMAL

## 2017-05-26 PROCEDURE — 93306 TTE W/DOPPLER COMPLETE: CPT | Mod: S$GLB,,, | Performed by: INTERNAL MEDICINE

## 2017-06-01 ENCOUNTER — PATIENT MESSAGE (OUTPATIENT)
Dept: CARDIOLOGY | Facility: CLINIC | Age: 72
End: 2017-06-01

## 2017-06-19 RX ORDER — LEVOTHYROXINE SODIUM 50 UG/1
TABLET ORAL
Qty: 90 TABLET | Refills: 3 | Status: SHIPPED | OUTPATIENT
Start: 2017-06-19 | End: 2017-07-03 | Stop reason: SDUPTHER

## 2017-06-19 RX ORDER — CITALOPRAM 10 MG/1
TABLET ORAL
Qty: 90 TABLET | Refills: 3 | Status: SHIPPED | OUTPATIENT
Start: 2017-06-19 | End: 2017-07-26

## 2017-06-29 ENCOUNTER — PATIENT MESSAGE (OUTPATIENT)
Dept: FAMILY MEDICINE | Facility: CLINIC | Age: 72
End: 2017-06-29

## 2017-06-29 DIAGNOSIS — M32.9 LUPUS (SYSTEMIC LUPUS ERYTHEMATOSUS): Chronic | ICD-10-CM

## 2017-06-29 DIAGNOSIS — D63.1 ANEMIA IN CHRONIC KIDNEY DISEASE (CODE): ICD-10-CM

## 2017-06-29 DIAGNOSIS — R53.83 FATIGUE, UNSPECIFIED TYPE: Primary | ICD-10-CM

## 2017-06-29 DIAGNOSIS — D64.9 ANEMIA, UNSPECIFIED TYPE: ICD-10-CM

## 2017-06-29 RX ORDER — HYDROXYCHLOROQUINE SULFATE 200 MG/1
TABLET, FILM COATED ORAL
Qty: 180 TABLET | Refills: 1 | Status: SHIPPED | OUTPATIENT
Start: 2017-06-29 | End: 2017-12-12 | Stop reason: SDUPTHER

## 2017-06-30 ENCOUNTER — LAB VISIT (OUTPATIENT)
Dept: LAB | Facility: HOSPITAL | Age: 72
End: 2017-06-30
Attending: FAMILY MEDICINE
Payer: MEDICARE

## 2017-06-30 DIAGNOSIS — D63.1 ANEMIA IN CHRONIC KIDNEY DISEASE (CODE): ICD-10-CM

## 2017-06-30 DIAGNOSIS — D64.9 ANEMIA, UNSPECIFIED TYPE: ICD-10-CM

## 2017-06-30 DIAGNOSIS — R53.83 FATIGUE, UNSPECIFIED TYPE: ICD-10-CM

## 2017-06-30 DIAGNOSIS — E03.9 HYPOTHYROIDISM, UNSPECIFIED TYPE: Primary | ICD-10-CM

## 2017-06-30 LAB
FERRITIN SERPL-MCNC: 205 NG/ML
IRON SERPL-MCNC: 48 UG/DL
SATURATED IRON: 17 %
T3 SERPL-MCNC: 68 NG/DL
T4 SERPL-MCNC: 4.4 UG/DL
TOTAL IRON BINDING CAPACITY: 277 UG/DL
TRANSFERRIN SERPL-MCNC: 187 MG/DL
TSH SERPL DL<=0.005 MIU/L-ACNC: 1.31 UIU/ML

## 2017-06-30 PROCEDURE — 84443 ASSAY THYROID STIM HORMONE: CPT

## 2017-06-30 PROCEDURE — 84436 ASSAY OF TOTAL THYROXINE: CPT

## 2017-06-30 PROCEDURE — 36415 COLL VENOUS BLD VENIPUNCTURE: CPT | Mod: PO

## 2017-06-30 PROCEDURE — 82728 ASSAY OF FERRITIN: CPT

## 2017-06-30 PROCEDURE — 84480 ASSAY TRIIODOTHYRONINE (T3): CPT

## 2017-06-30 PROCEDURE — 83540 ASSAY OF IRON: CPT

## 2017-07-03 RX ORDER — LEVOTHYROXINE SODIUM 75 UG/1
75 TABLET ORAL
Qty: 30 TABLET | Refills: 1 | Status: SHIPPED | OUTPATIENT
Start: 2017-07-03 | End: 2017-07-12 | Stop reason: SDUPTHER

## 2017-07-04 ENCOUNTER — PATIENT MESSAGE (OUTPATIENT)
Dept: FAMILY MEDICINE | Facility: CLINIC | Age: 72
End: 2017-07-04

## 2017-07-10 DIAGNOSIS — E55.9 VITAMIN D DEFICIENCY: ICD-10-CM

## 2017-07-10 RX ORDER — ERGOCALCIFEROL 1.25 MG/1
CAPSULE ORAL
Qty: 3 CAPSULE | Refills: 0 | Status: SHIPPED | OUTPATIENT
Start: 2017-07-10 | End: 2017-09-11 | Stop reason: SDUPTHER

## 2017-07-12 ENCOUNTER — PATIENT MESSAGE (OUTPATIENT)
Dept: FAMILY MEDICINE | Facility: CLINIC | Age: 72
End: 2017-07-12

## 2017-07-12 DIAGNOSIS — E03.9 HYPOTHYROIDISM, UNSPECIFIED TYPE: ICD-10-CM

## 2017-07-12 RX ORDER — LEVOTHYROXINE SODIUM 75 UG/1
75 TABLET ORAL
Qty: 90 TABLET | Refills: 1 | Status: SHIPPED | OUTPATIENT
Start: 2017-07-12 | End: 2017-07-15 | Stop reason: SDUPTHER

## 2017-07-15 ENCOUNTER — PATIENT MESSAGE (OUTPATIENT)
Dept: GASTROENTEROLOGY | Facility: CLINIC | Age: 72
End: 2017-07-15

## 2017-07-15 ENCOUNTER — TELEPHONE (OUTPATIENT)
Dept: INTERNAL MEDICINE | Facility: CLINIC | Age: 72
End: 2017-07-15

## 2017-07-15 ENCOUNTER — PATIENT MESSAGE (OUTPATIENT)
Dept: FAMILY MEDICINE | Facility: CLINIC | Age: 72
End: 2017-07-15

## 2017-07-15 DIAGNOSIS — E03.9 HYPOTHYROIDISM, UNSPECIFIED TYPE: ICD-10-CM

## 2017-07-15 DIAGNOSIS — Z12.31 OTHER SCREENING MAMMOGRAM: Primary | ICD-10-CM

## 2017-07-15 RX ORDER — LEVOTHYROXINE SODIUM 75 UG/1
75 TABLET ORAL
Qty: 90 TABLET | Refills: 1 | Status: SHIPPED | OUTPATIENT
Start: 2017-07-15 | End: 2017-11-01 | Stop reason: SDUPTHER

## 2017-07-17 ENCOUNTER — TELEPHONE (OUTPATIENT)
Dept: FAMILY MEDICINE | Facility: CLINIC | Age: 72
End: 2017-07-17

## 2017-07-17 DIAGNOSIS — Z12.31 OTHER SCREENING MAMMOGRAM: Primary | ICD-10-CM

## 2017-07-17 NOTE — TELEPHONE ENCOUNTER
"Spoke with patient advised her noted on the mammo order it states  "pt would like 3d mammogram only if there is no charge for it."  Patient provided number to the [facility] to schedule appt   "

## 2017-07-17 NOTE — TELEPHONE ENCOUNTER
----- Message from Marlin Horan sent at 7/17/2017 11:14 AM CDT -----  Contact: Self [phone]  Pt stated 3D mammogram request is not placed on file Please advise once sent.

## 2017-07-17 NOTE — TELEPHONE ENCOUNTER
----- Message from Eulalia Aviles sent at 7/17/2017 10:09 AM CDT -----  Pt Ewelina Arnold wants to make sure that her mammogram will be a 3D  - please advise and I will schedule

## 2017-07-17 NOTE — TELEPHONE ENCOUNTER
Spoke with patient states she was advised per the [facility] that the mammo order would need to state 3D mammo instead of digital screening.   Spoke with Jh with the [facility] states order can stay the same for now but if patient chooses to get the 3D mammo they would contact our office to have the order changed.  Spoke with patient advised her of Jh's message above.  I recommended patient contact her insurance company to see if the 3D mammo is covered.  Patient states and will contact our office back if she chooses to have order placed.

## 2017-07-18 ENCOUNTER — TELEPHONE (OUTPATIENT)
Dept: GASTROENTEROLOGY | Facility: CLINIC | Age: 72
End: 2017-07-18

## 2017-07-18 NOTE — TELEPHONE ENCOUNTER
Spoke with pt and I was able to schedule her an appt with Dr. Montano on 7/28/17 at 3:00pm. Verbal Understanding.

## 2017-07-19 ENCOUNTER — PATIENT MESSAGE (OUTPATIENT)
Dept: GASTROENTEROLOGY | Facility: CLINIC | Age: 72
End: 2017-07-19

## 2017-07-24 ENCOUNTER — TELEPHONE (OUTPATIENT)
Dept: HEMATOLOGY/ONCOLOGY | Facility: CLINIC | Age: 72
End: 2017-07-24

## 2017-07-24 NOTE — TELEPHONE ENCOUNTER
Pt requesting sooner appt with Dr. Montano. Unable to come in on Fri 7/28. Appt given for Tue 7/25/17. Instructed pt to arrive at 10:45am. Pt verbalized understanding    ----- Message from Niki Esparza sent at 7/24/2017  8:55 AM CDT -----  Contact: [phone]/self  Patient called in requesting to speak with you. Did not specify why. Please advise.

## 2017-07-25 ENCOUNTER — OFFICE VISIT (OUTPATIENT)
Dept: HEMATOLOGY/ONCOLOGY | Facility: CLINIC | Age: 72
End: 2017-07-25
Payer: MEDICARE

## 2017-07-25 VITALS
BODY MASS INDEX: 21.26 KG/M2 | HEART RATE: 61 BPM | WEIGHT: 127.63 LBS | DIASTOLIC BLOOD PRESSURE: 88 MMHG | HEIGHT: 65 IN | SYSTOLIC BLOOD PRESSURE: 116 MMHG

## 2017-07-25 DIAGNOSIS — N18.9 ANEMIA ASSOCIATED WITH CHRONIC RENAL FAILURE: ICD-10-CM

## 2017-07-25 DIAGNOSIS — D63.1 ANEMIA ASSOCIATED WITH CHRONIC RENAL FAILURE: ICD-10-CM

## 2017-07-25 DIAGNOSIS — F32.5 MAJOR DEPRESSIVE DISORDER WITH SINGLE EPISODE, IN FULL REMISSION: Primary | ICD-10-CM

## 2017-07-25 DIAGNOSIS — M32.8 OTHER FORMS OF SYSTEMIC LUPUS ERYTHEMATOSUS: Chronic | ICD-10-CM

## 2017-07-25 DIAGNOSIS — N18.30 STAGE 3 CHRONIC KIDNEY DISEASE: ICD-10-CM

## 2017-07-25 DIAGNOSIS — R91.1 PULMONARY NODULE: ICD-10-CM

## 2017-07-25 DIAGNOSIS — D47.2 MGUS (MONOCLONAL GAMMOPATHY OF UNKNOWN SIGNIFICANCE): ICD-10-CM

## 2017-07-25 DIAGNOSIS — I25.10 CORONARY ARTERY DISEASE INVOLVING NATIVE CORONARY ARTERY WITHOUT ANGINA PECTORIS, UNSPECIFIED WHETHER NATIVE OR TRANSPLANTED HEART: Chronic | ICD-10-CM

## 2017-07-25 DIAGNOSIS — D53.9 NUTRITIONAL ANEMIA: ICD-10-CM

## 2017-07-25 DIAGNOSIS — D63.8 ANEMIA OF OTHER CHRONIC DISEASE: ICD-10-CM

## 2017-07-25 PROCEDURE — 1159F MED LIST DOCD IN RCRD: CPT | Mod: S$GLB,,, | Performed by: INTERNAL MEDICINE

## 2017-07-25 PROCEDURE — 99499 UNLISTED E&M SERVICE: CPT | Mod: S$GLB,,, | Performed by: INTERNAL MEDICINE

## 2017-07-25 PROCEDURE — 99999 PR PBB SHADOW E&M-EST. PATIENT-LVL III: CPT | Mod: PBBFAC,,, | Performed by: INTERNAL MEDICINE

## 2017-07-25 PROCEDURE — 99204 OFFICE O/P NEW MOD 45 MIN: CPT | Mod: S$GLB,,, | Performed by: INTERNAL MEDICINE

## 2017-07-25 PROCEDURE — 1126F AMNT PAIN NOTED NONE PRSNT: CPT | Mod: S$GLB,,, | Performed by: INTERNAL MEDICINE

## 2017-07-25 NOTE — LETTER
July 25, 2017      Elizabeth Cohen, SHANITA  [address] [place] [address]           [facility]  [address] [place] [address]  Phone: [phone]          Patient: Ewelina Arnold   MR Number: [id number]   YOB: 1945   Date of Visit: 7/25/2017       Dear Elizabeth Cohen:    Thank you for referring Ewelina Arnold to me for evaluation. Attached you will find relevant portions of my assessment and plan of care.    If you have questions, please do not hesitate to call me. I look forward to following Ewelina Arnold along with you.    Sincerely,    Rick Montano MD    Enclosure  CC:  No Recipients    If you would like to receive this communication electronically, please contact [email or web address] or [phone] to request more information on [facility] access.    For providers and/or their staff who would like to refer a patient to [facility], please contact us through our one-stop-shop provider referral line, Jovana Peters, at [phone].    If you feel you have received this communication in error or would no longer like to receive these types of communications, please e-mail [email or web address]
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
## 2017-07-26 ENCOUNTER — PATIENT MESSAGE (OUTPATIENT)
Dept: FAMILY MEDICINE | Facility: CLINIC | Age: 72
End: 2017-07-26

## 2017-07-26 RX ORDER — CITALOPRAM 20 MG/1
20 TABLET, FILM COATED ORAL DAILY
Qty: 90 TABLET | Refills: 3 | Status: SHIPPED | OUTPATIENT
Start: 2017-07-26 | End: 2017-07-28 | Stop reason: SDUPTHER

## 2017-07-27 ENCOUNTER — HOSPITAL ENCOUNTER (OUTPATIENT)
Dept: RADIOLOGY | Facility: HOSPITAL | Age: 72
Discharge: HOME | End: 2017-07-27
Attending: INTERNAL MEDICINE
Payer: MEDICARE

## 2017-07-27 DIAGNOSIS — R91.1 PULMONARY NODULE: ICD-10-CM

## 2017-07-27 PROCEDURE — 71250 CT THORAX DX C-: CPT | Mod: 26,,, | Performed by: RADIOLOGY

## 2017-07-27 PROCEDURE — 71250 CT THORAX DX C-: CPT | Mod: TC

## 2017-07-28 ENCOUNTER — PATIENT MESSAGE (OUTPATIENT)
Dept: FAMILY MEDICINE | Facility: CLINIC | Age: 72
End: 2017-07-28

## 2017-07-28 RX ORDER — CITALOPRAM 20 MG/1
20 TABLET, FILM COATED ORAL DAILY
Qty: 90 TABLET | Refills: 1 | Status: SHIPPED | OUTPATIENT
Start: 2017-07-28 | End: 2017-11-30 | Stop reason: SDUPTHER

## 2017-07-28 NOTE — TELEPHONE ENCOUNTER
----- Message from Selam Ferraro sent at 7/28/2017  1:24 PM CDT -----  Contact: self/[phone]  Pt called in regards to her Rx for citalopram (CELEXA) 20 MG tablet was sent to the wrong pharmacy. She wants it to go to [facility] mail order. She would like a call when it is done.      Please advise

## 2017-07-28 NOTE — TELEPHONE ENCOUNTER
Spoke with Vikki with [facility]'s pharmacy Celexa 20mg that was E-RX on 7/26/17 canceled.  Celexa pended to go to [facility].

## 2017-07-31 ENCOUNTER — PATIENT MESSAGE (OUTPATIENT)
Dept: HEMATOLOGY/ONCOLOGY | Facility: CLINIC | Age: 72
End: 2017-07-31

## 2017-07-31 ENCOUNTER — TELEPHONE (OUTPATIENT)
Dept: HEMATOLOGY/ONCOLOGY | Facility: CLINIC | Age: 72
End: 2017-07-31

## 2017-07-31 NOTE — TELEPHONE ENCOUNTER
Spoke to patient regarding results over the phone.  She has MGUS.  Explained diagnosis in detail.  Will schedule follow-up in 6 months.  All questions answered.

## 2017-07-31 NOTE — TELEPHONE ENCOUNTER
----- Message from Kim Gamble sent at 7/31/2017  3:44 PM CDT -----  Contact: [phone]/ self   Patient requesting to speak with you regarding her CT scan and lab results. Please advise.

## 2017-08-03 ENCOUNTER — PATIENT MESSAGE (OUTPATIENT)
Dept: PULMONOLOGY | Facility: CLINIC | Age: 72
End: 2017-08-03

## 2017-08-07 DIAGNOSIS — R91.1 PULMONARY NODULE: Primary | ICD-10-CM

## 2017-08-08 ENCOUNTER — PATIENT MESSAGE (OUTPATIENT)
Dept: GASTROENTEROLOGY | Facility: CLINIC | Age: 72
End: 2017-08-08

## 2017-08-09 ENCOUNTER — OFFICE VISIT (OUTPATIENT)
Dept: OPHTHALMOLOGY | Facility: CLINIC | Age: 72
End: 2017-08-09
Payer: MEDICARE

## 2017-08-09 ENCOUNTER — CLINICAL SUPPORT (OUTPATIENT)
Dept: OPHTHALMOLOGY | Facility: CLINIC | Age: 72
End: 2017-08-09
Payer: MEDICARE

## 2017-08-09 DIAGNOSIS — H40.243 RESIDUAL STAGE OF ANGLE-CLOSURE GLAUCOMA OF BOTH EYES: Primary | ICD-10-CM

## 2017-08-09 DIAGNOSIS — M32.9 LUPUS (SYSTEMIC LUPUS ERYTHEMATOSUS): Chronic | ICD-10-CM

## 2017-08-09 DIAGNOSIS — Z79.899 HIGH RISK MEDICATION USE: ICD-10-CM

## 2017-08-09 PROCEDURE — 92012 INTRM OPH EXAM EST PATIENT: CPT | Mod: S$GLB,,, | Performed by: OPHTHALMOLOGY

## 2017-08-09 PROCEDURE — 92083 EXTENDED VISUAL FIELD XM: CPT | Mod: S$GLB,,, | Performed by: OPHTHALMOLOGY

## 2017-08-09 PROCEDURE — 99499 UNLISTED E&M SERVICE: CPT | Mod: S$GLB,,, | Performed by: OPHTHALMOLOGY

## 2017-08-09 PROCEDURE — 92133 CPTRZD OPH DX IMG PST SGM ON: CPT | Mod: S$GLB,,, | Performed by: OPHTHALMOLOGY

## 2017-08-09 PROCEDURE — 99999 PR PBB SHADOW E&M-EST. PATIENT-LVL II: CPT | Mod: PBBFAC,,, | Performed by: OPHTHALMOLOGY

## 2017-08-09 RX ORDER — NITROGLYCERIN 0.4 MG/1
0.4 TABLET SUBLINGUAL
COMMUNITY
End: 2017-09-19 | Stop reason: SDUPTHER

## 2017-08-09 RX ORDER — MECLIZINE HYDROCHLORIDE CHEWABLE TABLETS 25 MG/1
32 TABLET, CHEWABLE ORAL
COMMUNITY
End: 2017-09-19

## 2017-08-09 NOTE — PROGRESS NOTES
HPI     Glaucoma    Additional comments: 6 mon iop chk/hvf rev/oct           Comments   Patient feels like her va is a little worse in OU since her last visit.    Glaucoma   Plaquenil pt   Lupus   Dry Eyes  NSC OU     Latanoprost Q HS OU  Artificial Tears PRN OU          Last edited by Doyle Burgess on 8/9/2017 10:33 AM. (History)            Assessment /Plan     For exam results, see Encounter Report.    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  Will continue with same medicines as patient is tolerating well with good adherence    s/p PI OU    CCT  535 // 537    Target mid teens    Both eyes  Xal q HS    SLE on plaquenil > 5years  Amsler chart routine with Q & A  Doing well    SD-OCT Mac good Nl contour and thickness OU  Good COST/OLM integrity      NSC OU  Observe        Plan  RTC 6 months for IOP, DFE & HVF 24-2 & OCT Mac  RTC sooner prn with good understanding

## 2017-08-10 ENCOUNTER — TELEPHONE (OUTPATIENT)
Dept: OPHTHALMOLOGY | Facility: CLINIC | Age: 72
End: 2017-08-10

## 2017-08-10 NOTE — TELEPHONE ENCOUNTER
----- Message from Lance Roberts sent at 8/10/2017  1:17 PM CDT -----  Contact: Ewelina BledsoeMaribel Arnold would like to retake the [facility] test again before February. She can be reached at [phone].

## 2017-08-10 NOTE — TELEPHONE ENCOUNTER
Reassured pt that her visual field was normal, per Dr. Mccray.   Pt will wait 6 months for a repeat.

## 2017-08-15 ENCOUNTER — PATIENT MESSAGE (OUTPATIENT)
Dept: CARDIOLOGY | Facility: CLINIC | Age: 72
End: 2017-08-15

## 2017-08-16 ENCOUNTER — TELEPHONE (OUTPATIENT)
Dept: NEPHROLOGY | Facility: CLINIC | Age: 72
End: 2017-08-16

## 2017-08-16 DIAGNOSIS — N18.4 CKD (CHRONIC KIDNEY DISEASE), STAGE IV: Primary | ICD-10-CM

## 2017-08-16 NOTE — TELEPHONE ENCOUNTER
----- Message from Anat Caruso sent at 8/16/2017  8:21 AM CDT -----  Contact: pt  2nd request  - response     Pt has called previously     Needs labs schedules  -   [facility]    Please call pt  To  Schedule   [phone]     Thanks     Called pt and schedule lab

## 2017-08-17 ENCOUNTER — OFFICE VISIT (OUTPATIENT)
Dept: CARDIOLOGY | Facility: CLINIC | Age: 72
End: 2017-08-17
Payer: MEDICARE

## 2017-08-17 ENCOUNTER — TELEPHONE (OUTPATIENT)
Dept: CARDIOLOGY | Facility: CLINIC | Age: 72
End: 2017-08-17

## 2017-08-17 ENCOUNTER — LAB VISIT (OUTPATIENT)
Dept: LAB | Facility: HOSPITAL | Age: 72
End: 2017-08-17
Attending: INTERNAL MEDICINE
Payer: MEDICARE

## 2017-08-17 VITALS
HEIGHT: 65 IN | WEIGHT: 132.06 LBS | SYSTOLIC BLOOD PRESSURE: 142 MMHG | OXYGEN SATURATION: 95 % | BODY MASS INDEX: 22 KG/M2 | DIASTOLIC BLOOD PRESSURE: 65 MMHG | HEART RATE: 63 BPM

## 2017-08-17 DIAGNOSIS — I25.10 CORONARY ARTERY DISEASE INVOLVING NATIVE CORONARY ARTERY WITHOUT ANGINA PECTORIS, UNSPECIFIED WHETHER NATIVE OR TRANSPLANTED HEART: Primary | Chronic | ICD-10-CM

## 2017-08-17 DIAGNOSIS — N18.4 CKD (CHRONIC KIDNEY DISEASE), STAGE IV: ICD-10-CM

## 2017-08-17 DIAGNOSIS — E78.5 HYPERLIPIDEMIA, UNSPECIFIED HYPERLIPIDEMIA TYPE: ICD-10-CM

## 2017-08-17 DIAGNOSIS — R06.09 DOE (DYSPNEA ON EXERTION): ICD-10-CM

## 2017-08-17 DIAGNOSIS — I35.0 AORTIC VALVE STENOSIS, UNSPECIFIED ETIOLOGY: ICD-10-CM

## 2017-08-17 DIAGNOSIS — I51.89 LEFT VENTRICULAR DIASTOLIC DYSFUNCTION WITH PRESERVED SYSTOLIC FUNCTION: ICD-10-CM

## 2017-08-17 LAB
ALBUMIN SERPL BCP-MCNC: 2.6 G/DL
ANION GAP SERPL CALC-SCNC: 8 MMOL/L
BUN SERPL-MCNC: 36 MG/DL
CALCIUM SERPL-MCNC: 8.7 MG/DL
CHLORIDE SERPL-SCNC: 104 MMOL/L
CO2 SERPL-SCNC: 22 MMOL/L
CREAT SERPL-MCNC: 1.7 MG/DL
EST. GFR  (AFRICAN AMERICAN): 34.5 ML/MIN/1.73 M^2
EST. GFR  (NON AFRICAN AMERICAN): 29.9 ML/MIN/1.73 M^2
GLUCOSE SERPL-MCNC: 73 MG/DL
PHOSPHATE SERPL-MCNC: 3.7 MG/DL
POTASSIUM SERPL-SCNC: 4 MMOL/L
SODIUM SERPL-SCNC: 134 MMOL/L

## 2017-08-17 PROCEDURE — 1159F MED LIST DOCD IN RCRD: CPT | Mod: GC,S$GLB,, | Performed by: STUDENT IN AN ORGANIZED HEALTH CARE EDUCATION/TRAINING PROGRAM

## 2017-08-17 PROCEDURE — 99214 OFFICE O/P EST MOD 30 MIN: CPT | Mod: GC,S$GLB,, | Performed by: STUDENT IN AN ORGANIZED HEALTH CARE EDUCATION/TRAINING PROGRAM

## 2017-08-17 PROCEDURE — 3008F BODY MASS INDEX DOCD: CPT | Mod: GC,S$GLB,, | Performed by: STUDENT IN AN ORGANIZED HEALTH CARE EDUCATION/TRAINING PROGRAM

## 2017-08-17 PROCEDURE — 3077F SYST BP >= 140 MM HG: CPT | Mod: GC,S$GLB,, | Performed by: STUDENT IN AN ORGANIZED HEALTH CARE EDUCATION/TRAINING PROGRAM

## 2017-08-17 PROCEDURE — 1126F AMNT PAIN NOTED NONE PRSNT: CPT | Mod: GC,S$GLB,, | Performed by: STUDENT IN AN ORGANIZED HEALTH CARE EDUCATION/TRAINING PROGRAM

## 2017-08-17 PROCEDURE — 3078F DIAST BP <80 MM HG: CPT | Mod: GC,S$GLB,, | Performed by: STUDENT IN AN ORGANIZED HEALTH CARE EDUCATION/TRAINING PROGRAM

## 2017-08-17 PROCEDURE — 99999 PR PBB SHADOW E&M-EST. PATIENT-LVL V: CPT | Mod: PBBFAC,GC,, | Performed by: STUDENT IN AN ORGANIZED HEALTH CARE EDUCATION/TRAINING PROGRAM

## 2017-08-17 NOTE — ASSESSMENT & PLAN NOTE
- mod to severe on last echo in 5/2017, however echo staff review shows that aortic valve disease is mild to moderate at most  - continue to monitor

## 2017-08-17 NOTE — TELEPHONE ENCOUNTER
----- Message from Elham Wyman MD sent at 8/16/2017  3:53 PM CDT -----  Contact: pt  Please get her in to see me  ----- Message -----  From: Kirsten Jovel MA  Sent: 8/16/2017   9:48 AM  To: Elham Wyman MD    Patient is having s.o.b, swelling of left foot need to come in sooner than 1st avaliable.  ----- Message -----  From: Louise Harry  Sent: 8/16/2017   8:21 AM  To: [facility] Staff    Pt says per Dr. Wyman response to her email she should make an appt, but she only wants to see Dr. Wyman. Please call her at the number listed, and she was offered an appt with someone else and refused.    Thanks

## 2017-08-17 NOTE — ASSESSMENT & PLAN NOTE
- Presentation not consistent with HF and exam not suggestive of volume overload currently  - BP is much better controlled than before but will have patient check her pressure when she has symptoms as it could be higher at that time  - May be anemia and last labs from 4/2017, repeat cbc

## 2017-08-17 NOTE — PROGRESS NOTES
Cardiology Clinic Note  Reason for Visit: Follow up    HPI:   71 year old female with hx of HTN, CAD s/p PCI to LAD 2011, HLD, history partial colectomy for polyps and colon CA(cancer free now), single kidney s/p kidney donation 1985, SLE. Last non-invasive assessment was 3/2016 by exercise echo with no evidence of WMA and normal EF. Last TTE 5/2017 with mod-severe AS (peak velocity 3.13 ms, PABLITO 1.0 cm2, peak gradient 39).    Today she presents with a 1 month history of increased PEDRO and SOB. States she cannot even handle daily activities at this point. Sometimes has to unhook her bra to breath better.    Uses 1 pillow to sleep, denies PND or orthopnea, denies palpitations. Denies any weight gain and checks weight daily. Notes some leg cramping after walking up 7 steps but this does not occur when she walks normally and gets short of breath.    ROS:    Constitution: Negative for fever, chills, weight loss or gain.   HENT: Negative for sore throat, rhinorrhea, or headache.  Eyes: Negative for blurred or double vision.   Cardiovascular: See above  Pulmonary: Positive for SOB   Gastrointestinal: Negative for abdominal pain, nausea, vomiting, or diarrhea.   : Negative for dysuria.   Neurological: Negative for focal weakness or sensory changes.  PMH:     Past Medical History:   Diagnosis Date    Allergy     Anatomical narrow angle glaucoma     Anemia     States diagnosed about a month ago    Aortic atherosclerosis     Arthritis     Cataract     Chronic kidney disease     Chronic sciatica of left side     Right lower back pain due to sciatica    Coronary artery disease     Depression     Dry eyes     GERD (gastroesophageal reflux disease)     History of colon cancer     Hyperlipidemia     Hypertension     Hypothyroidism     Left ventricular diastolic dysfunction with preserved systolic function     Lupus (systemic lupus erythematosus) 8/17/2012    Malnutrition 5/16/2017    Osteoarthritis of  cervical spine 8/17/2012    Osteopenia     PAD (peripheral artery disease)      Past Surgical History:   Procedure Laterality Date    CARDIAC CATHETERIZATION  07/27/2011    x1    CHOLECYSTECTOMY  1999    COLON SURGERY  2000 & 2003    partial removal    COLONOSCOPY N/A 4/14/2016    Procedure: COLONOSCOPY;  Surgeon: Jose Steen MD;  Location: [facility] ([address] [place] [address]);  Service: Endoscopy;  Laterality: N/A;  prep 2 days prior light meals only/clear liquid day before  and 4 dulcolax tabs (5 mgs) at noon    EYE SURGERY      HAND SURGERY Bilateral 1996 & 1997    due to carpal tunnel     HERNIA REPAIR      HYSTERECTOMY  1983    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    OOPHORECTOMY      removed one    Peripheral Iridotomy both eyes  1994    laser angle correction    THYROIDECTOMY, PARTIAL  2006    to remove two nodules and one-half thyroid     Allergies:     Review of patient's allergies indicates:   Allergen Reactions    Ace inhibitors      Other reaction(s): Lips Swelling    Arthrotec 50  [diclofenac-misoprostol]      Other reaction(s): Unknown    Bactrim [sulfamethoxazole-trimethoprim]      Pt would like this medication to be added due to elevation of creatinine with single kidney.    Bentyl [dicyclomine]      Not effective    Doxycycline      nausea    Imdur [isosorbide mononitrate] Nausea Only    Lomotil [diphenoxylate-atropine]      Stomach cramps, pt vomitted    Tramadol Nausea Only    Vioxx [rofecoxib]      Other reaction(s): lips swelling    Lozol [indapamide] Rash     Medications:     Current Outpatient Prescriptions on File Prior to Visit   Medication Sig Dispense Refill    acetaminophen (TYLENOL) 500 MG tablet Take 500 mg by mouth every 6 (six) hours as needed for Pain.      amlodipine (NORVASC) 10 MG tablet TAKE 1 TABLET EVERY DAY 90 tablet 3    ammonium lactate 12 % Crea Apply 1 application topically 2 (two) times daily as needed. 385 g 2    aspirin (ECOTRIN) 81 MG EC tablet Take 1  tablet (81 mg total) by mouth once daily. (Patient taking differently: Take 81 mg by mouth once daily. 1 tablet every other day)  0    betamethasone dipropionate (DIPROLENE) 0.05 % cream Apply topically 2 (two) times daily as needed. 45 g 2    butalbital-aspirin-caffeine -40 mg (FIORINAL) -40 mg Cap Take 1 capsule by mouth every 6 (six) hours as needed.       carvedilol (COREG) 12.5 MG tablet TAKE 1 TABLET TWICE DAILY WITH MEALS 180 tablet 3    cholestyramine (QUESTRAN) 4 gram packet Take 1 packet (4 g total) by mouth 3 (three) times daily with meals. 90 packet 4    citalopram (CELEXA) 20 MG tablet Take 1 tablet (20 mg total) by mouth once daily. 90 tablet 1    conjugated estrogens (PREMARIN) vaginal cream Use 1g nightly for two weeks, then 1g twice per week. 30 g 12    ferrous gluconate (FERGON) 325 MG Tab Take 1 tablet (325 mg total) by mouth daily with breakfast.  0    fexofenadine (ALLEGRA) 180 MG tablet Take 1 tablet (180 mg total) by mouth once daily. 30 tablet 2    hydrALAZINE (APRESOLINE) 10 MG tablet TAKE 2 TABLETS EVERY 12 HOURS 360 tablet 3    hydrochlorothiazide (HYDRODIURIL) 25 MG tablet Take 1 tablet (25 mg total) by mouth once daily. 90 tablet 3    hydroxychloroquine (PLAQUENIL) 200 mg tablet TAKE 1 TABLET TWICE DAILY 180 tablet 1    hyoscyamine (LEVSIN/SL) 0.125 mg Subl Place 1 tablet (0.125 mg total) under the tongue 2 (two) times daily as needed. 60 tablet 1    ipratropium (ATROVENT) 0.03 % nasal spray 2 sprays by Nasal route 2 (two) times daily as needed for Rhinitis. 30 mL 0    latanoprost 0.005 % ophthalmic solution INSTILL 1 DROP INTO BOTH EYES EVERY DAY 3 Bottle 3    levothyroxine (SYNTHROID) 75 MCG tablet Take 1 tablet (75 mcg total) by mouth before breakfast. 90 tablet 1    meclizine (ANTIVERT) 12.5 mg tablet Take 1 tablet (12.5 mg total) by mouth 3 (three) times daily as needed. 30 tablet 1    meclizine (ANTIVERT) 32 MG tablet Take 32 mg by mouth.       "mometasone (ELOCON) 0.1 % ointment Apply topically once daily. 45 g 3    multivitamin capsule Take 1 capsule by mouth once daily.      nitroGLYCERIN (NITROSTAT) 0.4 MG SL tablet PLACE 1 TABLET UNDER THE TONGUE EVERY 5 MINUTES FOR 3 DOSES --IF NOT RELIEVED GO TO THE ER 25 tablet 6    nitroGLYCERIN (NITROSTAT) 0.4 MG SL tablet Place 0.4 mg under the tongue.      nystatin-triamcinolone (MYCOLOG II) cream Apply topically 4 (four) times daily. 60 g 0    omeprazole (PRILOSEC) 40 MG capsule Take 1 capsule (40 mg total) by mouth once daily. 90 capsule 3    rosuvastatin (CRESTOR) 40 MG Tab Take 1 tablet (40 mg total) by mouth every evening. 90 tablet 3    TENS units Sravanthi 1 application by Misc.(Non-Drug; Combo Route) route daily as needed (pain). 1 Device 0    triamcinolone acetonide 0.1% (KENALOG) 0.1 % paste PLACE ONTO TEETH TWICE DAILY (Patient taking differently: PLACE ONTO TEETH PRN) 5 g 0    VITAMIN D2 50,000 unit capsule TAKE 1 CAPSULE (50,000 UNITS TOTAL) EVERY 30 DAYS 3 capsule 0     No current facility-administered medications on file prior to visit.      Social History:     Social History   Substance Use Topics    Smoking status: Former Smoker     Packs/day: 1.50     Years: 30.00     Quit date: 3/13/1985    Smokeless tobacco: Never Used    Alcohol use No     Family History:     Family History   Problem Relation Age of Onset    Heart attack Mother     Hypertension Mother     Heart disease Father     Hypertension Father     Diabetes Maternal Grandmother     Hypertension Sister     Kidney disease Brother     Kidney failure Brother      received donated kidney from sister    No Known Problems Daughter     Diabetes Son     Hypertension Brother     Acne Neg Hx     Eczema Neg Hx     Lupus Neg Hx     Psoriasis Neg Hx     Melanoma Neg Hx      Physical Exam:   BP (!) 142/65 (BP Location: Left arm, Patient Position: Sitting, BP Method: Large (Automatic))   Pulse 63   Ht 5' 5" (1.651 m)   Wt 59.9 " kg (132 lb 0.9 oz)   SpO2 95%   BMI 21.98 kg/m²      Constitutional: NAD, conversant  HEENT: Sclera anicteric, PERRLA, EOMI  Neck: 10-12 cm JVD, radiation of systolic murmur present  CV: RRR, 3/6 mid peaking systolic ejection murmur, normal S1/S2  Pulm: CTAB, no wheezes, rales, or ronchi  GI: Abdomen soft, NTND, +BS  Extremities: No LE edema, warm and well perfused  Skin: No ecchymosis, erythema, or ulcers  Psych: AOx3, appropriate affect    Labs:     Lab Results   Component Value Date     (L) 08/17/2017    K 4.0 08/17/2017     08/17/2017    CO2 22 (L) 08/17/2017    BUN 36 (H) 08/17/2017    CREATININE 1.7 (H) 08/17/2017    ANIONGAP 8 08/17/2017     Lab Results   Component Value Date    HGBA1C 5.7 01/24/2017     Lab Results   Component Value Date     (H) 07/11/2016     (H) 03/11/2016     (H) 05/03/2013    Lab Results   Component Value Date    WBC 4.83 04/21/2017    WBC 4.83 04/21/2017    HGB 10.5 (L) 04/21/2017    HGB 10.5 (L) 04/21/2017    HCT 33.1 (L) 04/21/2017    HCT 33.1 (L) 04/21/2017     04/21/2017     04/21/2017    GRAN 2.7 04/21/2017    GRAN 54.9 04/21/2017    GRAN 2.7 04/21/2017    GRAN 54.9 04/21/2017     Lab Results   Component Value Date    CHOL 123 03/23/2017    HDL 39 (L) 03/23/2017    LDLCALC 68.2 03/23/2017    TRIG 79 03/23/2017          Imaging:   TTE 5/2017 - ECHO STAFF REVIEW OF BELOW RESULTS SUGGESTIVE OF MILD-MOD AS NOT SEVERE  CONCLUSIONS     1 - Normal left ventricular systolic function (EF 65-70%).     2 - Concentric hypertrophy.     3 - No wall motion abnormalities.     4 - Severe left atrial enlargement.     5 - Impaired LV relaxation, elevated LAP (grade 2 diastolic dysfunction).     6 - Normal right ventricular systolic function .     7 - Moderate to severe aortic stenosis, PABLITO = 1.0 cm2, peak velocity = 3.13 m/s, mean gradient = 23 mmHg.     8 - Trivial to mild aortic regurgitation.     9 - Mild mitral regurgitation.     10 - Mild  tricuspid regurgitation.     11 - The estimated PA systolic pressure is 30 mmHg.     CT Chest 17    EF   Date Value Ref Range Status   2017 68 55 - 65    2016 65 55 - 65    2013 65         EK2016 sinus evelyn with PAC  Assessment:     Problem List Items Addressed This Visit        Cardiac/Vascular    CAD (coronary artery disease) - Primary (Chronic)     - continue asa, BB, statin         Left ventricular diastolic dysfunction with preserved systolic function     - continue BP control, controlled today         Hyperlipidemia     - continue statin         Aortic stenosis     - mod to severe on last echo in 2017, however echo staff review shows that aortic valve disease is mild to moderate at most  - continue to monitor            Other    PEDRO (dyspnea on exertion)     - Presentation not consistent with HF and exam not suggestive of volume overload currently  - BP is much better controlled than before but will have patient check her pressure when she has symptoms as it could be higher at that time  - May be anemia and last labs from 2017, repeat cbc           Other Visit Diagnoses    None.         Plan:   CAD (coronary artery disease)  - continue asa, BB, statin    Left ventricular diastolic dysfunction with preserved systolic function  - continue BP control, controlled today    Hyperlipidemia  - continue statin    Aortic stenosis  - mod to severe on last echo in 2017, however echo staff review shows that aortic valve disease is mild to moderate at most  - continue to monitor    PEDRO (dyspnea on exertion)  - Presentation not consistent with HF and exam not suggestive of volume overload currently  - BP is much better controlled than before but will have patient check her pressure when she has symptoms as it could be higher at that time  - May be anemia and last labs from 2017, repeat cbc        Signed:  Mesfin Jameson DO  Cardiology Fellow  Pager - [phone]  2017 2:14 PM

## 2017-08-23 ENCOUNTER — OFFICE VISIT (OUTPATIENT)
Dept: NEPHROLOGY | Facility: CLINIC | Age: 72
End: 2017-08-23
Payer: MEDICARE

## 2017-08-23 ENCOUNTER — HOSPITAL ENCOUNTER (OUTPATIENT)
Dept: RADIOLOGY | Facility: HOSPITAL | Age: 72
Discharge: HOME | End: 2017-08-23
Attending: FAMILY MEDICINE
Payer: MEDICARE

## 2017-08-23 VITALS
HEIGHT: 65 IN | SYSTOLIC BLOOD PRESSURE: 150 MMHG | HEART RATE: 57 BPM | WEIGHT: 129 LBS | OXYGEN SATURATION: 98 % | BODY MASS INDEX: 21.49 KG/M2 | DIASTOLIC BLOOD PRESSURE: 70 MMHG

## 2017-08-23 DIAGNOSIS — Z12.31 OTHER SCREENING MAMMOGRAM: ICD-10-CM

## 2017-08-23 DIAGNOSIS — N18.30 CHRONIC KIDNEY DISEASE (CKD), STAGE III (MODERATE): Primary | ICD-10-CM

## 2017-08-23 DIAGNOSIS — R80.9 PROTEINURIA, UNSPECIFIED TYPE: ICD-10-CM

## 2017-08-23 DIAGNOSIS — M32.8 OTHER FORMS OF SYSTEMIC LUPUS ERYTHEMATOSUS: ICD-10-CM

## 2017-08-23 PROCEDURE — 99999 PR PBB SHADOW E&M-EST. PATIENT-LVL V: CPT | Mod: PBBFAC,,, | Performed by: INTERNAL MEDICINE

## 2017-08-23 PROCEDURE — 1126F AMNT PAIN NOTED NONE PRSNT: CPT | Mod: S$GLB,,, | Performed by: INTERNAL MEDICINE

## 2017-08-23 PROCEDURE — 3008F BODY MASS INDEX DOCD: CPT | Mod: S$GLB,,, | Performed by: INTERNAL MEDICINE

## 2017-08-23 PROCEDURE — 3077F SYST BP >= 140 MM HG: CPT | Mod: S$GLB,,, | Performed by: INTERNAL MEDICINE

## 2017-08-23 PROCEDURE — 99499 UNLISTED E&M SERVICE: CPT | Mod: S$GLB,,, | Performed by: INTERNAL MEDICINE

## 2017-08-23 PROCEDURE — 99213 OFFICE O/P EST LOW 20 MIN: CPT | Mod: S$GLB,,, | Performed by: INTERNAL MEDICINE

## 2017-08-23 PROCEDURE — 1159F MED LIST DOCD IN RCRD: CPT | Mod: S$GLB,,, | Performed by: INTERNAL MEDICINE

## 2017-08-23 PROCEDURE — 77067 SCR MAMMO BI INCL CAD: CPT | Mod: 26,,, | Performed by: RADIOLOGY

## 2017-08-23 PROCEDURE — 77063 BREAST TOMOSYNTHESIS BI: CPT | Mod: 26,,, | Performed by: RADIOLOGY

## 2017-08-23 PROCEDURE — 3078F DIAST BP <80 MM HG: CPT | Mod: S$GLB,,, | Performed by: INTERNAL MEDICINE

## 2017-08-23 NOTE — PROGRESS NOTES
Patient is here today for follow up evaluation of CKD III.  Baseline Cr; 1.5-1.7 mg/dl. Her most recent lab (8/17/17) Cr 1.6 mg/dl eGFR; 37 ml/min; potassium 4.1 mmol/L Urine P/C; 0.68 She has a solitary kidney (donor) and hx of SLE (quiescent) Today she has no new complaints    ROS;   Pleasant woman; no acute distress; oriented x3  Mood and Affect; Appropriate  Otherwise non-contributory    Exam  HEENT; Grossly intact  CHEST; Clear P&A; no rlaes or rhonchi  HEART; RR; S1&S2  II/VI JUJU  rub gallop  ABD; BS(+); non-tender; (-)CVAT  EXT; (-)Edema    Impression  CKD III Stable  Hypertension Satisfactory control    Plan  Return Visit; 4 mo

## 2017-08-24 DIAGNOSIS — R19.4 CHANGE IN BOWEL HABITS: Primary | ICD-10-CM

## 2017-08-28 ENCOUNTER — TELEPHONE (OUTPATIENT)
Dept: HEMATOLOGY/ONCOLOGY | Facility: CLINIC | Age: 72
End: 2017-08-28

## 2017-08-28 NOTE — TELEPHONE ENCOUNTER
----- Message from Niki Esparza sent at 8/28/2017 11:55 AM CDT -----  Contact: [phone]/ self   Pt its requesting to speak with the nurse in regarding her lab test results done 07/27/17. Please advise

## 2017-08-28 NOTE — TELEPHONE ENCOUNTER
Spoke c pt re: lab work from July. Pt would like to speak c Dr. Montano for elaboration of results. Made Dr. Montano aware. Will advise.

## 2017-08-29 ENCOUNTER — TELEPHONE (OUTPATIENT)
Dept: RHEUMATOLOGY | Facility: CLINIC | Age: 72
End: 2017-08-29

## 2017-08-29 NOTE — TELEPHONE ENCOUNTER
----- Message from Luciano Ramirez MD sent at 8/28/2017  8:28 PM CDT -----  Tell her I reviewed the lab and there is nothing new from my perspective; she still has lupus and it shows up in the blood. It is not worse  WD  ----- Message -----  From: Hui Bates RN  Sent: 8/28/2017   3:12 PM  To: Luciano Ramirez MD    Patient called stating she had labs done by Dr. Montano and was called and told she needs to go to her rheumatologist, because of the abnormal labs.  Asking if you can look at the results

## 2017-08-31 ENCOUNTER — TELEPHONE (OUTPATIENT)
Dept: ENDOSCOPY | Facility: HOSPITAL | Age: 72
End: 2017-08-31

## 2017-08-31 NOTE — TELEPHONE ENCOUNTER
Hi Dr Steen,    Patient called to schedule her colonoscopy. Patient reports she cannot drink liquid prep. May we call in Miralax prep for her?

## 2017-09-06 ENCOUNTER — TELEPHONE (OUTPATIENT)
Dept: ENDOSCOPY | Facility: HOSPITAL | Age: 72
End: 2017-09-06

## 2017-09-06 NOTE — TELEPHONE ENCOUNTER
Ewelina Arnold   Female, 71 y.o., 1945  Weight:   58.5 kg (128 lb 15.5 oz)  Phone:   *M:[phone]  PCP:   Kalie Walton MD  Language:   English  Need Interp:   No  Allergies:   Ace Inhibitors, Arthrotec 50 [Diclofenac-misoprostol], Bactrim [Sulfamethoxazole-trimethoprim], Bentyl [Dicyclomine], Doxycycline, Imdur [Isosorbide Mononitrate], Lomotil [Diphenoxylate-atropine], Tramadol, Vioxx [Rofecoxib], Lozol [Indapamide]  Health Maintenance:   Due  Primary Ins.:   [employer]IC[employer]  MRN:   [id number]  Pt Comm Pref:   None  Patient Portal:   Active  Next Appt:   11/27/2017  My Sticky Note:      Message   Received: 5 days ago   Message Contents   FLACA Valente RN   Caller: Unspecified (6 days ago,  9:38 AM)             yes    Previous Messages               Patient Calls     Nimo Denise RN   You 5 days ago      yes (Routing comment)          You routed conversation to Enio TERRELL Staff 6 days ago      You 6 days ago      Hi Dr Steen,     Patient called to schedule her colonoscopy. Patient reports she cannot drink liquid prep. May we call in Miralax prep for her?     Documentation

## 2017-09-06 NOTE — TELEPHONE ENCOUNTER
Hi Dr Steen,    Patient called to schedule colonoscopy. She states she cannot drink any liquid prep including Miralax. It makes her feel sick/nauseous. She reports she has used Dulcolax pills only in the past to prep for colonoscopies. May she prep for colonoscopy by taking Dulcolax only? Please advise.    Thank you,  Diana Ramirez RN

## 2017-09-11 DIAGNOSIS — E55.9 VITAMIN D DEFICIENCY: ICD-10-CM

## 2017-09-11 RX ORDER — ERGOCALCIFEROL 1.25 MG/1
CAPSULE ORAL
Qty: 3 CAPSULE | Refills: 0 | Status: ON HOLD | OUTPATIENT
Start: 2017-09-11 | End: 2018-03-28 | Stop reason: HOSPADM

## 2017-09-14 ENCOUNTER — SURGERY (OUTPATIENT)
Age: 72
End: 2017-09-14

## 2017-09-14 ENCOUNTER — ANESTHESIA EVENT (OUTPATIENT)
Dept: ENDOSCOPY | Facility: HOSPITAL | Age: 72
End: 2017-09-14
Payer: MEDICARE

## 2017-09-14 ENCOUNTER — ANESTHESIA (OUTPATIENT)
Dept: ENDOSCOPY | Facility: HOSPITAL | Age: 72
End: 2017-09-14
Payer: MEDICARE

## 2017-09-14 ENCOUNTER — HOSPITAL ENCOUNTER (OUTPATIENT)
Facility: HOSPITAL | Age: 72
Discharge: HOME | End: 2017-09-14
Attending: COLON & RECTAL SURGERY | Admitting: COLON & RECTAL SURGERY
Payer: MEDICARE

## 2017-09-14 VITALS
HEART RATE: 73 BPM | OXYGEN SATURATION: 94 % | TEMPERATURE: 98 F | RESPIRATION RATE: 18 BRPM | BODY MASS INDEX: 19.99 KG/M2 | HEIGHT: 65 IN | SYSTOLIC BLOOD PRESSURE: 159 MMHG | WEIGHT: 120 LBS | DIASTOLIC BLOOD PRESSURE: 74 MMHG

## 2017-09-14 DIAGNOSIS — Z12.11 SCREEN FOR COLON CANCER: ICD-10-CM

## 2017-09-14 PROCEDURE — 45378 DIAGNOSTIC COLONOSCOPY: CPT | Performed by: COLON & RECTAL SURGERY

## 2017-09-14 PROCEDURE — D9220A PRA ANESTHESIA: Mod: ANES,,, | Performed by: ANESTHESIOLOGY

## 2017-09-14 PROCEDURE — 63600175 PHARM REV CODE 636 W HCPCS: Performed by: NURSE ANESTHETIST, CERTIFIED REGISTERED

## 2017-09-14 PROCEDURE — 37000008 HC ANESTHESIA 1ST 15 MINUTES: Performed by: COLON & RECTAL SURGERY

## 2017-09-14 PROCEDURE — 25000003 PHARM REV CODE 250: Performed by: NURSE PRACTITIONER

## 2017-09-14 PROCEDURE — D9220A PRA ANESTHESIA: Mod: CRNA,,, | Performed by: NURSE ANESTHETIST, CERTIFIED REGISTERED

## 2017-09-14 PROCEDURE — 45378 DIAGNOSTIC COLONOSCOPY: CPT | Mod: ,,, | Performed by: COLON & RECTAL SURGERY

## 2017-09-14 RX ORDER — SODIUM CHLORIDE 9 MG/ML
INJECTION, SOLUTION INTRAVENOUS CONTINUOUS
Status: DISCONTINUED | OUTPATIENT
Start: 2017-09-14 | End: 2017-09-14 | Stop reason: HOSPADM

## 2017-09-14 RX ORDER — PROPOFOL 10 MG/ML
VIAL (ML) INTRAVENOUS
Status: DISCONTINUED | OUTPATIENT
Start: 2017-09-14 | End: 2017-09-14

## 2017-09-14 RX ORDER — LIDOCAINE HCL/PF 100 MG/5ML
SYRINGE (ML) INTRAVENOUS
Status: DISCONTINUED | OUTPATIENT
Start: 2017-09-14 | End: 2017-09-14

## 2017-09-14 RX ADMIN — PROPOFOL 70 MG: 10 INJECTION, EMULSION INTRAVENOUS at 11:09

## 2017-09-14 RX ADMIN — PROPOFOL 30 MG: 10 INJECTION, EMULSION INTRAVENOUS at 11:09

## 2017-09-14 RX ADMIN — SODIUM CHLORIDE: 900 INJECTION, SOLUTION INTRAVENOUS at 10:09

## 2017-09-14 RX ADMIN — LIDOCAINE HYDROCHLORIDE 50 MG: 20 INJECTION, SOLUTION INTRAVENOUS at 11:09

## 2017-09-14 NOTE — H&P
Endoscopy H&P    Procedure : Colonoscopy      constipation      Past Medical History:   Diagnosis Date    Acute myocardial infarction     Allergy     Anatomical narrow angle glaucoma     Anemia     States diagnosed about a month ago    Aortic atherosclerosis     Arthritis     Cataract     Chronic kidney disease     Chronic sciatica of left side     Right lower back pain due to sciatica    Coronary artery disease     Depression     Dry eyes     GERD (gastroesophageal reflux disease)     History of colon cancer     Hyperlipidemia     Hypertension     Hypothyroidism     Left ventricular diastolic dysfunction with preserved systolic function     Lupus (systemic lupus erythematosus) 8/17/2012    Malnutrition 5/16/2017    Osteoarthritis of cervical spine 8/17/2012    Osteopenia     PAD (peripheral artery disease)              Review of Systems -ROS:  GENERAL: No fever, chills, fatigability or weight loss.  CHEST: Denies PEDRO, cyanosis, wheezing, cough and sputum production.  CARDIOVASCULAR: Denies chest pain, PND, orthopnea or reduced exercise tolerance.   Musculoskeletal ROS: negative for - gait disturbance or joint pain  Neurological ROS: negative for - confusion or memory loss        Physical Exam:  General: well developed, well nourished, no distress  Head: normocephalic  Neck: supple, symmetrical, trachea midline  Lungs:  clear to auscultation bilaterally and normal respiratory effort  Heart: regular rate and rhythm, S1, S2 normal, no murmur, rub or gallop and regular rate and rhythm  Abdomen: soft, non-tender non-distented; bowel sounds normal; no masses,  no organomegaly  Extremities: no cyanosis or edema, or clubbing       Moderate Sedation (choice): Mallampati Score 1    ASA : II    IMP: constipation    Plan: Colonoscopy with Moderate sedation.  I have explained the procedure including indications, alternatives, expected outcomes and potential complications. The patient appears to  understand and gives informed consent. The patient is medically ready for surgery.

## 2017-09-14 NOTE — ANESTHESIA POSTPROCEDURE EVALUATION
"Anesthesia Post Evaluation    Patient: Ewelina Arnold    Procedure(s) Performed: Procedure(s) (LRB):  COLONOSCOPY (N/A)    Final Anesthesia Type: general  Patient location during evaluation: GI PACU  Patient participation: Yes- Able to Participate  Level of consciousness: awake and alert and oriented  Post-procedure vital signs: reviewed and stable  Pain management: adequate  Airway patency: patent  PONV status at discharge: No PONV  Anesthetic complications: no      Cardiovascular status: stable  Respiratory status: unassisted, spontaneous ventilation and room air  Hydration status: euvolemic  Follow-up not needed.        Visit Vitals  BP (!) 148/68   Pulse 73   Temp 36.4 °C (97.6 °F) (Oral)   Resp 18   Ht 5' 5" (1.651 m)   Wt 54.4 kg (120 lb)   SpO2 95%   Breastfeeding? No   BMI 19.97 kg/m²       Pain/Juan Francisco Score: Pain Assessment Performed: Yes (9/14/2017 12:04 PM)  Presence of Pain: denies (9/14/2017 12:04 PM)  Juan Francisco Score: 10 (9/14/2017 12:04 PM)      "

## 2017-09-14 NOTE — ANESTHESIA PREPROCEDURE EVALUATION
09/14/2017  Ewelina Arnold is a 71 y.o., female here for colonoscopy.  Echo shows normal EF with mod-severe aortic stenosis.  Sees cardiologist regularly. No active chest pain or SOB    CONCLUSIONS     1 - Normal left ventricular systolic function (EF 65-70%).     2 - Concentric hypertrophy.     3 - No wall motion abnormalities.     4 - Severe left atrial enlargement.     5 - Impaired LV relaxation, elevated LAP (grade 2 diastolic dysfunction).     6 - Normal right ventricular systolic function .     7 - Moderate to severe aortic stenosis, PABLITO = 1.0 cm2, peak velocity = 3.13 m/s, mean gradient = 23 mmHg.     8 - Trivial to mild aortic regurgitation.     9 - Mild mitral regurgitation.     10 - Mild tricuspid regurgitation.     11 - The estimated PA systolic pressure is 30 mmHg.           Past Medical History:   Diagnosis Date    Acute myocardial infarction     Allergy     Anatomical narrow angle glaucoma     Anemia     States diagnosed about a month ago    Aortic atherosclerosis     Arthritis     Cataract     Chronic kidney disease     Chronic sciatica of left side     Right lower back pain due to sciatica    Coronary artery disease     Depression     Dry eyes     GERD (gastroesophageal reflux disease)     History of colon cancer     Hyperlipidemia     Hypertension     Hypothyroidism     Left ventricular diastolic dysfunction with preserved systolic function     Lupus (systemic lupus erythematosus) 8/17/2012    Malnutrition 5/16/2017    Osteoarthritis of cervical spine 8/17/2012    Osteopenia     PAD (peripheral artery disease)      Lab Results   Component Value Date    WBC 5.31 08/17/2017    HGB 9.5 (L) 08/17/2017    HCT 29.7 (L) 08/17/2017    MCV 80 (L) 08/17/2017     08/17/2017       Chemistry        Component Value Date/Time     (L) 08/17/2017 1545    K 4.1 08/17/2017  1545     08/17/2017 1545    CO2 20 (L) 08/17/2017 1545    BUN 36 (H) 08/17/2017 1545    CREATININE 1.6 (H) 08/17/2017 1545    GLU 79 08/17/2017 1545        Component Value Date/Time    CALCIUM 8.4 (L) 08/17/2017 1545    ALKPHOS 84 08/17/2017 1545    AST 27 08/17/2017 1545    ALT 9 (L) 08/17/2017 1545    BILITOT 0.3 08/17/2017 1545    ESTGFRAFRICA 37.1 (A) 08/17/2017 1545    EGFRNONAA 32.2 (A) 08/17/2017 1545            Anesthesia Evaluation    I have reviewed the Patient Summary Reports.     I have reviewed the Medications.     Review of Systems  Anesthesia Hx:  No problems with previous Anesthesia    Cardiovascular:   Hypertension           Anesthesia Plan  Type of Anesthesia, risks & benefits discussed:  Anesthesia Type:  general  Patient's Preference:   Intra-op Monitoring Plan: standard ASA monitors  Intra-op Monitoring Plan Comments:   Post Op Pain Control Plan: multimodal analgesia  Post Op Pain Control Plan Comments:   Induction:   IV  Beta Blocker:  Patient is not currently on a Beta-Blocker (No further documentation required).       Informed Consent: Patient understands risks and agrees with Anesthesia plan.  Questions answered. Anesthesia consent signed with patient.  ASA Score: 3     Day of Surgery Review of History & Physical:    H&P update referred to the surgeon.         Ready For Surgery From Anesthesia Perspective.

## 2017-09-14 NOTE — ANESTHESIA RELEASE NOTE
"Anesthesia Release from PACU Note    Patient: Ewelina Arnold    Procedure(s) Performed: Procedure(s) (LRB):  COLONOSCOPY (N/A)    Anesthesia type: GEN    Post pain: Adequate analgesia reported    Post assessment: no apparent anesthetic complications, tolerated procedure well and no evidence of recall    Post vital signs: BP (!) 148/68   Pulse 73   Temp 36.4 °C (97.6 °F) (Oral)   Resp 18   Ht 5' 5" (1.651 m)   Wt 54.4 kg (120 lb)   SpO2 95%   Breastfeeding? No   BMI 19.97 kg/m²     Level of consciousness: awake, alert and oriented    Nausea/Vomiting: no nausea/no vomiting    Complications: none    Airway Patency: patent    Respiratory: unassisted, spontaneous ventilation, room air    Cardiovascular: stable and blood pressure at baseline    Hydration: euvolemic    "

## 2017-09-14 NOTE — TRANSFER OF CARE
"Anesthesia Transfer of Care Note    Patient: Ewelina Arnold    Procedure(s) Performed: Procedure(s) (LRB):  COLONOSCOPY (N/A)    Patient location: PACU    Anesthesia Type: general    Transport from OR: Transported from OR on room air with adequate spontaneous ventilation    Post pain: adequate analgesia    Post assessment: no apparent anesthetic complications and tolerated procedure well    Post vital signs: stable    Level of consciousness: awake, alert and oriented    Nausea/Vomiting: no nausea/vomiting    Complications: none    Transfer of care protocol was followed      Last vitals:   Visit Vitals  BP (!) 148/68   Pulse 66   Temp 36.4 °C (97.6 °F) (Oral)   Resp 20   Ht 5' 5" (1.651 m)   Wt 54.4 kg (120 lb)   SpO2 98%   Breastfeeding? No   BMI 19.97 kg/m²     "

## 2017-09-17 ENCOUNTER — OFFICE VISIT (OUTPATIENT)
Dept: URGENT CARE | Facility: CLINIC | Age: 72
End: 2017-09-17
Payer: MEDICARE

## 2017-09-17 ENCOUNTER — NURSE TRIAGE (OUTPATIENT)
Dept: ADMINISTRATIVE | Facility: CLINIC | Age: 72
End: 2017-09-17

## 2017-09-17 VITALS
SYSTOLIC BLOOD PRESSURE: 171 MMHG | HEART RATE: 76 BPM | TEMPERATURE: 98 F | BODY MASS INDEX: 19.99 KG/M2 | OXYGEN SATURATION: 98 % | HEIGHT: 65 IN | DIASTOLIC BLOOD PRESSURE: 74 MMHG | WEIGHT: 120 LBS

## 2017-09-17 DIAGNOSIS — I10 HYPERTENSION, UNCONTROLLED: ICD-10-CM

## 2017-09-17 DIAGNOSIS — S00.83XA FACIAL CONTUSION, INITIAL ENCOUNTER: ICD-10-CM

## 2017-09-17 DIAGNOSIS — S09.90XA HEAD INJURY, INITIAL ENCOUNTER: Primary | ICD-10-CM

## 2017-09-17 DIAGNOSIS — S00.531A CONTUSION OF LIP, INITIAL ENCOUNTER: ICD-10-CM

## 2017-09-17 PROCEDURE — 3078F DIAST BP <80 MM HG: CPT | Mod: S$GLB,,, | Performed by: EMERGENCY MEDICINE

## 2017-09-17 PROCEDURE — 3008F BODY MASS INDEX DOCD: CPT | Mod: S$GLB,,, | Performed by: EMERGENCY MEDICINE

## 2017-09-17 PROCEDURE — 1159F MED LIST DOCD IN RCRD: CPT | Mod: S$GLB,,, | Performed by: EMERGENCY MEDICINE

## 2017-09-17 PROCEDURE — 3077F SYST BP >= 140 MM HG: CPT | Mod: S$GLB,,, | Performed by: EMERGENCY MEDICINE

## 2017-09-17 PROCEDURE — 99214 OFFICE O/P EST MOD 30 MIN: CPT | Mod: 25,S$GLB,, | Performed by: EMERGENCY MEDICINE

## 2017-09-17 RX ORDER — FLUCONAZOLE 150 MG/1
150 TABLET ORAL ONCE
Qty: 1 TABLET | Refills: 1 | Status: SHIPPED | OUTPATIENT
Start: 2017-09-17 | End: 2017-09-17

## 2017-09-17 RX ORDER — CHLORHEXIDINE GLUCONATE ORAL RINSE 1.2 MG/ML
15 SOLUTION DENTAL 2 TIMES DAILY
Qty: 100 ML | Refills: 0 | Status: SHIPPED | OUTPATIENT
Start: 2017-09-17 | End: 2017-10-01

## 2017-09-17 RX ORDER — CLINDAMYCIN HYDROCHLORIDE 150 MG/1
300 CAPSULE ORAL EVERY 6 HOURS
Qty: 40 CAPSULE | Refills: 0 | Status: SHIPPED | OUTPATIENT
Start: 2017-09-17 | End: 2017-09-24

## 2017-09-17 NOTE — TELEPHONE ENCOUNTER
"  Reason for Disposition   Large swelling or bruise > 2 inches (5 cm)    Answer Assessment - Initial Assessment Questions  1. MECHANISM: "How did the injury happen?"       Fell and hit left side of face  2. ONSET: "When did the injury happen?" (Minutes or hours ago)      2 days ago  3. LOCATION: "What part of the face is injured?"      Left face and lips  4. APPEARANCE of INJURY: "What does the face look like?"      Lips are swollen  5. BLEEDING: "Is it bleeding now?" If so, ask, "Is it difficult to stop?"      denies  6. PAIN: "Is there pain?" If so, ask: "How bad is the pain?"  (e.g., Scale 1-10; or mild, moderate, severe)      denies  7. SIZE: For cuts, bruises, or swelling, ask: "How large is it?" (e.g., inches or centimeters)       Large swelling of lips  8. TETANUS: For any breaks in the skin, ask: "When was the last tetanus booster?"      na  9. OTHER SYMPTOMS: "Do you have any other symptoms?" (e.g., neck pain, headache, loss of consciousness)      denies  10. PREGNANCY: "Is there any chance you are pregnant?" "When was your last menstrual period?"        Na    Recommend to be seen in urgent care in AM    Protocols used: ST FACE INJURY-A-    "

## 2017-09-17 NOTE — PATIENT INSTRUCTIONS
Go to the Emergency Room if symptoms or condition worsens in any way    Follow up with   [facility]  in 5-7 days if not improved [phone]    Head Injury (Adult)    You have a head injury. It does not appear serious at this time. But symptoms of a more serious problem, such as a mild brain injury (concussion) or bruising or bleeding in the brain, may appear later. For this reason, you or someone caring for you will need to watch for the symptoms listed below. Once youre home, also be sure to follow any care instructions youre given.  Home care  Watch for the following symptoms  Seek emergency medical care if you have any of these symptoms over the next hours to days:   · Headache  · Nausea or vomiting  · Dizziness  · Sensitivity to light or noise  · Unusual sleepiness or grogginess  · Trouble falling asleep  · Personality changes  · Vision changes  · Memory loss  · Confusion  · Trouble walking or clumsiness  · Loss of consciousness (even for a short time)  · Inability to be awakened  · Stiff neck  · Weakness or numbness in any part of the body  · Seizures  General care  · If you were prescribed medicines for pain, use them as directed. Note: Dont take other medicines for pain without talking to your provider first.  · To help reduce swelling and pain, apply a cold source to the injured area for up to 20 minutes at a time. Do this as often as directed. Use a cold pack or bag of ice wrapped in a thin towel. Never apply a cold source directly to the skin.  · If you have cuts or scrapes as a result of your head injury, care for them as directed.  · For the next 24 hours (or longer, if instructed):  ¨ Dont drink alcohol or use sedatives or other medicines that make you sleepy.  ¨ Dont drive or operate machinery.  ¨ Dont do anything strenuous, such as heavy lifting or straining.  ¨ Limit tasks that require concentration. This includes reading, using a smartphone or computer, watching TV, and playing video  games.  ¨ Dont return to sports or other activities that could result in another head injury.  Follow-up care  Follow up with your healthcare provider, or as directed. If imaging tests were done, they will be reviewed by a doctor. You will be told the results and any new findings that may affect your care.  When to seek medical advice  Call your healthcare provider right away if any of these occur:  · Pain doesnt get better or worsens  · New or increased swelling or bruising  · Fever of 100.4°F (38°C) or higher, or as directed by your provider  · Increased redness, warmth, drainage, or bleeding from the injured area  · Fluid drainage or bleeding from the nose or ears  · Any depression or bony abnormality in the injured area  Date Last Reviewed: 9/26/2015 © 2000-2017 [employer]. [address] [place] [address]. All rights reserved. This information is not intended as a substitute for professional medical care. Always follow your healthcare professional's instructions.        Hematoma  A hematoma is a collection of blood trapped outside of a blood vessel. It is what we think of as a bruise or a contusion. It is usually seen under the skin as a black and blue spot on your arm or leg, or a bump on your head after an injury. It can be almost anywhere on or in your body. It can also occur in an internal organ where it can be more serious.  A hematoma is caused by an injury with damage to small blood vessels. This causes blood to leak into the tissues. Blood forms a pocket under the skin that swells and looks like a purplish patch.  Gradually the blood in the hematoma is absorbed back into the body. The swelling and pain of the hematoma will go away. This takes from 1 to 4 weeks, depending on the size of the hematoma. The skin over the hematoma may turn bluish then brown and yellow as the blood is dissolved and absorbed. Usually, this only takes a couple of weeks but can last months.  Home  care  · Limit motion of the joints near the hematoma. If the hematoma is large and painful, avoid sports and other vigorous physical activity until the swelling and pain goes away.  · Apply an ice pack (ice cubes in a plastic bag, wrapped in a thin towel or a frozen bag of peas) over the injured area for 20 minutes every 1 to 2 hours the first day. Continue with ice packs 3 to 4 times a day for the next 2 days. Continue the use of ice packs for relief of pain and swelling as needed.  · If you need anything for pain, you can take acetaminophen, unless you were given a different pain medicine to use. Talk with your doctor before using this medicine if you have chronic liver or kidney disease. Also talk with your doctor if you have had a stomach ulcer or digestive tract bleeding, or are taking blood-thinner medicines.  Follow-up care  Follow up with your healthcare provider, or as advised. If X-rays or a CT scan were done, you will be notified if there is a change in the reading, especially if it affects treatment.  When to seek medical advice  Call your healthcare provider right away f any of the following occur:  · Redness around the hematoma  · Increase in pain or warmth in the hematoma  · Increase in size of the hematoma  · Fever of 100.4ºF (38ºC) or higher, or as directed by your healthcare provider  · If the hematoma is on the arm or leg, watch for:  ¨ Increased swelling or pain in the extremity  ¨ Numbness or tingling or blue color of the hand or foot  Date Last Reviewed: 11/5/2015  © 7771-2397 [employer]. [address] [place] [address], [place] [address]. All rights reserved. This information is not intended as a substitute for professional medical care. Always follow your healthcare professional's instructions.

## 2017-09-17 NOTE — PROGRESS NOTES
"Subjective:       Patient ID: Ewelina Arnold is a 71 y.o. female.    Vitals:  height is 5' 5" (1.651 m) and weight is 54.4 kg (120 lb). Her oral temperature is 97.8 °F (36.6 °C). Her blood pressure is 171/74 (abnormal) and her pulse is 76. Her oxygen saturation is 98%.     Chief Complaint: Fall (Pt states she fell Friday and hit the left side of her face and her lip is swelling.)    Pt states she fell over the curb getting out of her car and hit the concrete.      Fall   The accident occurred 12 to 24 hours ago. The fall occurred while walking (Pt missed the curb getting out of her car and hit her head the concrete). She fell from a height of 3 to 5 ft. She landed on concrete. The point of impact was the head, face, left hip and buttocks. The pain is present in the head, face, buttocks, left upper leg, left hip and left knee. The pain is at a severity of 2/10. The patient is experiencing no pain. The symptoms are aggravated by cold (tylenol). Pertinent negatives include no abdominal pain, hematuria or numbness. Associated symptoms comments: Pt states she lost a lot of blood from her mouth. She has tried acetaminophen for the symptoms. The treatment provided no relief.     Review of Systems   Constitution: Positive for weakness and malaise/fatigue.   HENT: Negative for nosebleeds.         Bleeding from mouth   Cardiovascular: Negative for chest pain and syncope.   Respiratory: Negative for shortness of breath.    Musculoskeletal: Negative for back pain, joint pain and neck pain.        Pt states she has a little pain above her left eye   Gastrointestinal: Negative for abdominal pain.   Genitourinary: Negative for hematuria.   Neurological: Negative for dizziness and numbness.       Objective:      Physical Exam   Constitutional: She is oriented to person, place, and time. She appears well-developed and well-nourished. She is cooperative.  Non-toxic appearance. She does not appear ill. No distress.   HENT:   Head: Head " is with contusion. Head is without abrasion and without laceration (upper lip).       Right Ear: Hearing, tympanic membrane, external ear and ear canal normal. No hemotympanum.   Left Ear: Hearing, tympanic membrane, external ear and ear canal normal. No hemotympanum.   Nose: No mucosal edema, rhinorrhea or nasal deformity. No epistaxis. Right sinus exhibits no maxillary sinus tenderness and no frontal sinus tenderness. Left sinus exhibits maxillary sinus tenderness. Left sinus exhibits no frontal sinus tenderness.   Mouth/Throat: Uvula is midline, oropharynx is clear and moist and mucous membranes are normal. No trismus in the jaw. Normal dentition. No uvula swelling. No posterior oropharyngeal erythema.   Eyes: Conjunctivae, EOM and lids are normal. Pupils are equal, round, and reactive to light. Right eye exhibits no discharge. Left eye exhibits no discharge. No scleral icterus.   Sclera clear bilat   Neck: Trachea normal, normal range of motion, full passive range of motion without pain and phonation normal. Neck supple. No spinous process tenderness and no muscular tenderness present. No neck rigidity. No tracheal deviation present.   Cardiovascular: Normal rate, regular rhythm, normal heart sounds, intact distal pulses and normal pulses.    Pulmonary/Chest: Effort normal and breath sounds normal. No respiratory distress.   Abdominal: Soft. Normal appearance and bowel sounds are normal. She exhibits no distension, no pulsatile midline mass and no mass. There is no tenderness.   Musculoskeletal: Normal range of motion. She exhibits no edema or deformity.   Neurological: She is alert and oriented to person, place, and time. She has normal strength. No cranial nerve deficit or sensory deficit. She exhibits normal muscle tone. She displays no seizure activity. Coordination normal. GCS eye subscore is 4. GCS verbal subscore is 5. GCS motor subscore is 6.   Skin: Skin is warm, dry and intact. Capillary refill takes  less than 2 seconds. Bruising and ecchymosis noted. No abrasion, no burn and no laceration noted. She is not diaphoretic. No pallor.   Psychiatric: She has a normal mood and affect. Her speech is normal and behavior is normal. Judgment and thought content normal. Cognition and memory are normal.   Nursing note and vitals reviewed.      Assessment:       1. Head injury, initial encounter    2. Facial contusion, initial encounter    3. Hematoma of intraoral surface of lip        Plan:         Head injury, initial encounter    Facial contusion, initial encounter    Hematoma of intraoral surface of lip    Other orders  -     clindamycin (CLEOCIN HCL) 150 MG capsule; Take 2 capsules (300 mg total) by mouth every 6 (six) hours.  Dispense: 40 capsule; Refill: 0  -     chlorhexidine (PERIDEX) 0.12 % solution; Use as directed 15 mLs in the mouth or throat 2 (two) times daily.  Dispense: 100 mL; Refill: 0  -     fluconazole (DIFLUCAN) 150 MG Tab; Take 1 tablet (150 mg total) by mouth once. May repeat in 1 week if not improved  Dispense: 1 tablet; Refill: 1    Medical decision-makin-year-old female who presents 48 hours after a trip and fall in the parking lot. Patient states that she hit the left side of her face a concrete curb. She denies a loss of consciousness at the time. She sustained and laceration to her upper lip with bleeding. She sustained a large bruise to the left side of her face. She denies pain or headache at this time. Patient was concerned because she thought perhaps that her lip might be infected because it is swollen and tender. Patient denies neck pain. She denies upper or lower extremity pain. She denies back pain. Patient takes in aspirin per day but denies any previous history of blood center use. Her physical exam reveals a large area of ecchymosis around the left side of the face. It starts above her right eye and she has perioral ecchymosis and ecchymosis to the left may lower region. She has  a large hematoma to the left upper lip. There is dried blood to the intraoral surface of the lip as well. Patient has no erythema. There is mild tenderness. Patient has no visual symptoms are is no evidence of orbital entrapment. She is mildly tender over the anterior maxillary sinus wall. She has no inferior anesthesia to the face below the left eye. I've advised patient that based on current evaluation there would be a suspicion for facial fracture. Patient does not wish to go to the hospital to get CAT scans performed. Patient feels that her symptoms are secondary to bruising. As an alternative I have suggested she follow up in here is and throat clinic for now patient evaluation. Patient is not sure that she wishes to follow up and specially clinic. She will be given a prescription for clindamycin. She is also given Peridex mouthwash.  She will be given general head injury instructions in wound care instructions. She advised to go to the emergency room should symptoms worsen or change. Patient verbalize understanding instructions and agrees with plan.

## 2017-09-19 ENCOUNTER — TELEPHONE (OUTPATIENT)
Dept: FAMILY MEDICINE | Facility: CLINIC | Age: 72
End: 2017-09-19

## 2017-09-19 ENCOUNTER — PATIENT MESSAGE (OUTPATIENT)
Dept: FAMILY MEDICINE | Facility: CLINIC | Age: 72
End: 2017-09-19

## 2017-09-19 ENCOUNTER — OFFICE VISIT (OUTPATIENT)
Dept: FAMILY MEDICINE | Facility: CLINIC | Age: 72
End: 2017-09-19
Payer: MEDICARE

## 2017-09-19 ENCOUNTER — LAB VISIT (OUTPATIENT)
Dept: LAB | Facility: HOSPITAL | Age: 72
End: 2017-09-19
Attending: FAMILY MEDICINE
Payer: MEDICARE

## 2017-09-19 VITALS
SYSTOLIC BLOOD PRESSURE: 152 MMHG | DIASTOLIC BLOOD PRESSURE: 60 MMHG | BODY MASS INDEX: 21.31 KG/M2 | WEIGHT: 127.88 LBS | TEMPERATURE: 98 F | HEIGHT: 65 IN

## 2017-09-19 DIAGNOSIS — S09.8XXA BLUNT HEAD TRAUMA, INITIAL ENCOUNTER: ICD-10-CM
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
## 2017-09-19 DIAGNOSIS — T14.90XA: Primary | ICD-10-CM

## 2017-09-19 DIAGNOSIS — S09.90XD HEAD TRAUMA, SUBSEQUENT ENCOUNTER: ICD-10-CM

## 2017-09-19 DIAGNOSIS — E07.9 DISORDER OF THYROID: ICD-10-CM

## 2017-09-19 DIAGNOSIS — B37.9 YEAST INFECTION: ICD-10-CM

## 2017-09-19 PROBLEM — S09.90XA HEAD TRAUMA: Status: ACTIVE | Noted: 2017-09-19

## 2017-09-19 LAB
ANION GAP SERPL CALC-SCNC: 9 MMOL/L
BASOPHILS # BLD AUTO: 0.02 K/UL
BASOPHILS NFR BLD: 0.6 %
BUN SERPL-MCNC: 35 MG/DL
CALCIUM SERPL-MCNC: 8.7 MG/DL
CHLORIDE SERPL-SCNC: 109 MMOL/L
CO2 SERPL-SCNC: 21 MMOL/L
CREAT SERPL-MCNC: 1.6 MG/DL
DIFFERENTIAL METHOD: ABNORMAL
EOSINOPHIL # BLD AUTO: 0.1 K/UL
EOSINOPHIL NFR BLD: 3.9 %
ERYTHROCYTE [DISTWIDTH] IN BLOOD BY AUTOMATED COUNT: 14.1 %
EST. GFR  (AFRICAN AMERICAN): 37.1 ML/MIN/1.73 M^2
EST. GFR  (NON AFRICAN AMERICAN): 32.2 ML/MIN/1.73 M^2
GLUCOSE SERPL-MCNC: 93 MG/DL
HCT VFR BLD AUTO: 28.7 %
HGB BLD-MCNC: 9.2 G/DL
LYMPHOCYTES # BLD AUTO: 0.8 K/UL
LYMPHOCYTES NFR BLD: 24.2 %
MCH RBC QN AUTO: 25.7 PG
MCHC RBC AUTO-ENTMCNC: 32.1 G/DL
MCV RBC AUTO: 80 FL
MONOCYTES # BLD AUTO: 0.4 K/UL
MONOCYTES NFR BLD: 13.1 %
NEUTROPHILS # BLD AUTO: 1.9 K/UL
NEUTROPHILS NFR BLD: 57.9 %
PLATELET # BLD AUTO: 177 K/UL
PMV BLD AUTO: 10.6 FL
POTASSIUM SERPL-SCNC: 3.6 MMOL/L
RBC # BLD AUTO: 3.58 M/UL
SODIUM SERPL-SCNC: 139 MMOL/L
TSH SERPL DL<=0.005 MIU/L-ACNC: 1.03 UIU/ML
WBC # BLD AUTO: 3.35 K/UL

## 2017-09-19 PROCEDURE — 36415 COLL VENOUS BLD VENIPUNCTURE: CPT | Mod: PO

## 2017-09-19 PROCEDURE — 99499 UNLISTED E&M SERVICE: CPT | Mod: S$GLB,,, | Performed by: FAMILY MEDICINE

## 2017-09-19 PROCEDURE — 99214 OFFICE O/P EST MOD 30 MIN: CPT | Mod: S$GLB,,, | Performed by: FAMILY MEDICINE

## 2017-09-19 PROCEDURE — 1159F MED LIST DOCD IN RCRD: CPT | Mod: S$GLB,,, | Performed by: FAMILY MEDICINE

## 2017-09-19 PROCEDURE — 84443 ASSAY THYROID STIM HORMONE: CPT

## 2017-09-19 PROCEDURE — 3077F SYST BP >= 140 MM HG: CPT | Mod: S$GLB,,, | Performed by: FAMILY MEDICINE

## 2017-09-19 PROCEDURE — 3008F BODY MASS INDEX DOCD: CPT | Mod: S$GLB,,, | Performed by: FAMILY MEDICINE

## 2017-09-19 PROCEDURE — 80048 BASIC METABOLIC PNL TOTAL CA: CPT

## 2017-09-19 PROCEDURE — 99999 PR PBB SHADOW E&M-EST. PATIENT-LVL III: CPT | Mod: PBBFAC,,, | Performed by: FAMILY MEDICINE

## 2017-09-19 PROCEDURE — 3078F DIAST BP <80 MM HG: CPT | Mod: S$GLB,,, | Performed by: FAMILY MEDICINE

## 2017-09-19 PROCEDURE — 85025 COMPLETE CBC W/AUTO DIFF WBC: CPT

## 2017-09-19 RX ORDER — FLUCONAZOLE 150 MG/1
150 TABLET ORAL DAILY
Qty: 1 TABLET | Refills: 3 | Status: SHIPPED | OUTPATIENT
Start: 2017-09-19 | End: 2017-09-20

## 2017-09-19 NOTE — TELEPHONE ENCOUNTER
----- Message from Debbie Bryan sent at 9/19/2017 10:47 AM CDT -----  Contact: Nimo PACK at [facility]  Needs permission to change the orders for the CT Scan.    Thank You

## 2017-09-19 NOTE — PROGRESS NOTES
Subjective:       Patient ID: Ewelina Arnold is a 71 y.o. female.    Chief Complaint: Fall (on 9/15/17)  Pt was seen in after hours over the weekend and treated.  She is for f/u today to get set up for CT to r/o facial fractures.  HPIsee above  Review of Systems   Constitutional: Negative.    HENT: Positive for facial swelling and mouth sores.    Eyes: Negative.    Respiratory: Negative.    Cardiovascular: Negative.    Gastrointestinal: Negative.    Endocrine: Negative.    Genitourinary: Negative.    Musculoskeletal: Negative.    Skin: Negative.    Allergic/Immunologic: Negative.    Neurological: Negative.    Hematological: Negative.    Psychiatric/Behavioral: Negative.        Objective:      Physical Exam   Constitutional: She is oriented to person, place, and time. She appears well-developed and well-nourished. No distress.       HENT:   Head: Head is with contusion and with left periorbital erythema.   Right Ear: External ear normal.   Left Ear: External ear normal.   Nose: Nose normal.   Mouth/Throat: Oropharynx is clear and moist.       Eyes: Conjunctivae, EOM and lids are normal. Pupils are equal, round, and reactive to light. Right eye exhibits no chemosis, no discharge, no exudate and no hordeolum. No foreign body present in the right eye. Left eye exhibits no chemosis, no discharge, no exudate and no hordeolum. No foreign body present in the left eye. Right conjunctiva is not injected. Right conjunctiva has no hemorrhage. Left conjunctiva is not injected. Left conjunctiva has no hemorrhage. No scleral icterus. Right eye exhibits no nystagmus. Left eye exhibits no nystagmus.   Cardiovascular: Normal rate, regular rhythm, normal heart sounds and intact distal pulses.    Pulmonary/Chest: Effort normal and breath sounds normal. No respiratory distress. She has no wheezes. She has no rales.   Abdominal: Soft. Bowel sounds are normal. She exhibits no distension and no mass. There is no tenderness. There is no  rebound and no guarding. No hernia.   Musculoskeletal: Normal range of motion. She exhibits no edema, tenderness or deformity.   Neurological: She is alert and oriented to person, place, and time. She displays normal reflexes. No cranial nerve deficit or sensory deficit. She exhibits normal muscle tone. Coordination normal.   Skin: Skin is warm and dry. No rash noted. She is not diaphoretic. No erythema. No pallor.   Psychiatric: She has a normal mood and affect. Her behavior is normal. Judgment and thought content normal.   Nursing note and vitals reviewed.      Assessment:       1. Complication of trauma    2. Blunt head trauma, initial encounter    3. Yeast infection    4. Fall, subsequent encounter    5. Disorder of thyroid         Plan:        TSH         CT Maxillofacial Without Contrast         CT Head Without Contrast         CT Cervical Spine Without Contrast         CBC auto differential         Basic metabolic panel        Will contact pt with results when available.  Apply ice to swollen areas on face prn

## 2017-09-19 NOTE — TELEPHONE ENCOUNTER
----- Message from Kaylan Sagastume sent at 9/19/2017 10:49 AM CDT -----  Contact: Nimo/[phone]  Nimo with [facility] scan department is calling in regards needing to talk with Dr Ruth about clarifying for the order.Please call and advise.       Thank you!!!

## 2017-09-19 NOTE — TELEPHONE ENCOUNTER
----- Message from Tran Wilkins sent at 9/19/2017  2:32 PM CDT -----  Contact: call pt at [phone]  Patient is calling to find out if the ointment discussed at appointment for her lip and face was called into the pharmacy for her    wal greens on [address]

## 2017-09-19 NOTE — TELEPHONE ENCOUNTER
Spoke with Nimo with [facility] [phone] needs  CT order clarified.  Nimo states if the CT was ordered for trauma contrast isn't needed.  Also orbits are included in the maxillofacial, Nimo would like to know if you would like just the cervical, head and maxillofacial.  CT scheduled for tomorrow.  Please advise.

## 2017-09-20 ENCOUNTER — PATIENT MESSAGE (OUTPATIENT)
Dept: FAMILY MEDICINE | Facility: CLINIC | Age: 72
End: 2017-09-20

## 2017-09-20 ENCOUNTER — HOSPITAL ENCOUNTER (OUTPATIENT)
Dept: RADIOLOGY | Facility: OTHER | Age: 72
Discharge: HOME | End: 2017-09-20
Attending: FAMILY MEDICINE
Payer: MEDICARE

## 2017-09-20 ENCOUNTER — TELEPHONE (OUTPATIENT)
Dept: URGENT CARE | Facility: CLINIC | Age: 72
End: 2017-09-20

## 2017-09-20 ENCOUNTER — TELEPHONE (OUTPATIENT)
Dept: OCCUPATIONAL MEDICINE | Facility: CLINIC | Age: 72
End: 2017-09-20

## 2017-09-20 DIAGNOSIS — B37.9 YEAST INFECTION: Primary | ICD-10-CM

## 2017-09-20 DIAGNOSIS — S09.8XXA BLUNT HEAD TRAUMA, INITIAL ENCOUNTER: ICD-10-CM

## 2017-09-20 DIAGNOSIS — T14.90XA: ICD-10-CM

## 2017-09-20 PROCEDURE — 70486 CT MAXILLOFACIAL W/O DYE: CPT | Mod: 26,,, | Performed by: RADIOLOGY

## 2017-09-20 PROCEDURE — 70450 CT HEAD/BRAIN W/O DYE: CPT | Mod: TC

## 2017-09-20 PROCEDURE — 72125 CT NECK SPINE W/O DYE: CPT | Mod: 26,,, | Performed by: RADIOLOGY

## 2017-09-20 PROCEDURE — 70486 CT MAXILLOFACIAL W/O DYE: CPT | Mod: TC

## 2017-09-20 PROCEDURE — 70450 CT HEAD/BRAIN W/O DYE: CPT | Mod: 26,,, | Performed by: RADIOLOGY

## 2017-09-20 PROCEDURE — 72125 CT NECK SPINE W/O DYE: CPT | Mod: TC

## 2017-09-20 RX ORDER — NYSTATIN 100000 U/G
OINTMENT TOPICAL 2 TIMES DAILY
Qty: 30 G | Refills: 2 | Status: ON HOLD | OUTPATIENT
Start: 2017-09-20 | End: 2018-01-16 | Stop reason: HOSPADM

## 2017-09-20 NOTE — TELEPHONE ENCOUNTER
Please refer to patient's [facility] message sent today 9/20/17 concerning request for the ointment.

## 2017-09-20 NOTE — TELEPHONE ENCOUNTER
Spoke with patient regarding her recent head injury. Patient had actually followed up with her primary care physician and had outpatient CAT scans of her head face and neck. CAT scan results were negative for fracture or internal injuries. CAT scans did show soft-tissue bruising around the left side of the face in the periorbital region and cheek. Patient reports that overall she's doing well but has persistent swelling to her upper lip. She is currently on antibiotics and mouthwash as well. I've instructed her to do some saltwater swishes to cleanse the area of her upper lip. The time of her last visit we were unable to repair the injury to her lip secondary to the. Of time from the onset of injury. I've instructed patient that should her lip change in any way she is invited to return to the emergency room. I've told her that at this time I feel that the swelling may be present or a week or more secondary to the hematoma that was present. Patient verbalize understanding these instructions and agrees with this plan.

## 2017-09-21 ENCOUNTER — TELEPHONE (OUTPATIENT)
Dept: ENDOSCOPY | Facility: HOSPITAL | Age: 72
End: 2017-09-21

## 2017-09-25 ENCOUNTER — PATIENT MESSAGE (OUTPATIENT)
Dept: FAMILY MEDICINE | Facility: CLINIC | Age: 72
End: 2017-09-25

## 2017-09-27 ENCOUNTER — PATIENT MESSAGE (OUTPATIENT)
Dept: FAMILY MEDICINE | Facility: CLINIC | Age: 72
End: 2017-09-27

## 2017-09-27 ENCOUNTER — TELEPHONE (OUTPATIENT)
Dept: HEMATOLOGY/ONCOLOGY | Facility: CLINIC | Age: 72
End: 2017-09-27

## 2017-09-27 DIAGNOSIS — D64.9 ANEMIA, UNSPECIFIED TYPE: Primary | ICD-10-CM

## 2017-09-27 NOTE — TELEPHONE ENCOUNTER
Returned call to pt.   Pt stated that she would to move her consult appt to today.   Informed pt that unfortunately no availability today---pt denies any urgent symptoms for same day appt.   Pt asked if any availability tomorrow to which pt was informed unfortunately not.   Asked pt if she needs to reschedule on Friday---pt denied and stated she will keep same appt.   Provided instructions of where [address] located at [facility]   Pt verbalized understanding.         ----- Message from Marc Ramirez sent at 9/27/2017  1:26 PM CDT -----  Contact: Central scheduling   Pt has an appt on Friday w/ Dr. Pham, but will like to be seen on today due to today being a better time      Pt Contact::[phone]

## 2017-09-29 ENCOUNTER — LAB VISIT (OUTPATIENT)
Dept: LAB | Facility: OTHER | Age: 72
End: 2017-09-29
Attending: INTERNAL MEDICINE
Payer: MEDICARE

## 2017-09-29 ENCOUNTER — INITIAL CONSULT (OUTPATIENT)
Dept: HEMATOLOGY/ONCOLOGY | Facility: CLINIC | Age: 72
End: 2017-09-29
Attending: INTERNAL MEDICINE
Payer: MEDICARE

## 2017-09-29 VITALS
DIASTOLIC BLOOD PRESSURE: 74 MMHG | TEMPERATURE: 98 F | OXYGEN SATURATION: 98 % | WEIGHT: 129.19 LBS | HEART RATE: 62 BPM | RESPIRATION RATE: 20 BRPM | SYSTOLIC BLOOD PRESSURE: 167 MMHG | HEIGHT: 62 IN | BODY MASS INDEX: 23.77 KG/M2

## 2017-09-29 DIAGNOSIS — D63.8 ANEMIA OF OTHER CHRONIC DISEASE: Primary | ICD-10-CM

## 2017-09-29 DIAGNOSIS — D63.8 ANEMIA OF OTHER CHRONIC DISEASE: ICD-10-CM

## 2017-09-29 LAB
HAPTOGLOB SERPL-MCNC: 94 MG/DL
LDH SERPL L TO P-CCNC: 272 U/L
RETICS/RBC NFR AUTO: 0.7 %

## 2017-09-29 PROCEDURE — 83615 LACTATE (LD) (LDH) ENZYME: CPT

## 2017-09-29 PROCEDURE — 83010 ASSAY OF HAPTOGLOBIN QUANT: CPT

## 2017-09-29 PROCEDURE — 3077F SYST BP >= 140 MM HG: CPT | Mod: S$GLB,,, | Performed by: INTERNAL MEDICINE

## 2017-09-29 PROCEDURE — 3008F BODY MASS INDEX DOCD: CPT | Mod: S$GLB,,, | Performed by: INTERNAL MEDICINE

## 2017-09-29 PROCEDURE — 1159F MED LIST DOCD IN RCRD: CPT | Mod: S$GLB,,, | Performed by: INTERNAL MEDICINE

## 2017-09-29 PROCEDURE — 84238 ASSAY NONENDOCRINE RECEPTOR: CPT

## 2017-09-29 PROCEDURE — 99999 PR PBB SHADOW E&M-EST. PATIENT-LVL III: CPT | Mod: PBBFAC,,, | Performed by: INTERNAL MEDICINE

## 2017-09-29 PROCEDURE — 3078F DIAST BP <80 MM HG: CPT | Mod: S$GLB,,, | Performed by: INTERNAL MEDICINE

## 2017-09-29 PROCEDURE — 82668 ASSAY OF ERYTHROPOIETIN: CPT

## 2017-09-29 PROCEDURE — 99205 OFFICE O/P NEW HI 60 MIN: CPT | Mod: S$GLB,,, | Performed by: INTERNAL MEDICINE

## 2017-09-29 PROCEDURE — 36415 COLL VENOUS BLD VENIPUNCTURE: CPT

## 2017-09-29 PROCEDURE — 1126F AMNT PAIN NOTED NONE PRSNT: CPT | Mod: S$GLB,,, | Performed by: INTERNAL MEDICINE

## 2017-09-29 PROCEDURE — 85045 AUTOMATED RETICULOCYTE COUNT: CPT

## 2017-09-29 NOTE — LETTER
September 29, 2017      Kalie Walton MD  [address] [facility]  [address]  [facility] [address]           [facility], [place], operated by [facility]  [address], [address]  [facility] [address]  Phone: [phone]          Patient: Ewelina Arnold   MR Number: [id number]   YOB: 1945   Date of Visit: 9/29/2017       Dear Dr. Kalie Walton:    Thank you for referring Ewelina Arnold to me for evaluation. Attached you will find relevant portions of my assessment and plan of care.    If you have questions, please do not hesitate to call me. I look forward to following Ewelina Arnold along with you.    Sincerely,    Anju Pham MD    Enclosure  CC:  No Recipients    If you would like to receive this communication electronically, please contact [email or web address] or [phone] to request more information on ebookpie Link access.    For providers and/or their staff who would like to refer a patient to [facility], please contact us through our one-stop-shop provider referral line, [facility] , at [phone].    If you feel you have received this communication in error or would no longer like to receive these types of communications, please e-mail [email or web address]

## 2017-09-29 NOTE — PROGRESS NOTES
Subjective:       Patient ID: Ewelina Arnold is a 71 y.o. female.    Chief Complaint: Consult and Anemia    HPI     Consulted regarding anemia  Told she was anemic for about a year and states was told to take oral iron qd and then started taking bid about 1 month ago.  + weight loss noted over approximately 5 months ago (used to weight 140 lb and now 127 lbs)  States decreased appetite and not clear why  + Fatigue noted x 5-6 months. Reports sleeps a lot during the day.she does not sleep well at night however due to diuretics.    Active Ambulatory Problems     Diagnosis Date Noted    Lupus (systemic lupus erythematosus) 08/17/2012    CAD (coronary artery disease) 10/05/2012    Post PTCA 10/05/2012    High risk medication use - Both Eyes 11/27/2012    Residual stage angle-closure glaucoma - Both Eyes 11/27/2012    Nuclear sclerosis - Both Eyes 05/29/2013    Thyroid nodule 05/13/2015    Postsurgical hypothyroidism 05/13/2015    Osteopenia 05/13/2015    Insufficiency of tear film of both eyes 09/30/2015    Aortic atherosclerosis     Left ventricular diastolic dysfunction with preserved systolic function     GERD (gastroesophageal reflux disease)     History of colon cancer     Hyperlipidemia     Degeneration of lumbar or lumbosacral intervertebral disc 01/07/2016    Hypertensive kidney disease with chronic kidney disease stage III 02/07/2016    PEDRO (dyspnea on exertion) 03/11/2016    Tortuous aorta 07/21/2016    Positive dysphotopsia 02/13/2017    Weight loss, abnormal 03/16/2017    Pulmonary heart disease 05/16/2017    Secondary hyperparathyroidism of renal origin 05/16/2017    Peripheral arterial disease 05/16/2017    Major depressive disorder with single episode, in full remission 05/16/2017    Cervical spine arthritis 05/16/2017    Malnutrition 05/16/2017    Bilateral carotid artery disease 05/16/2017    Stage 3 chronic kidney disease 05/16/2017    Anemia of other chronic disease  07/25/2017    Anemia associated with chronic renal failure 07/25/2017    Aortic stenosis 08/17/2017    Screen for colon cancer 09/14/2017    Head trauma 09/19/2017     Resolved Ambulatory Problems     Diagnosis Date Noted    Mixed dyslipidemia 10/05/2012    CKD (chronic kidney disease) stage 3, GFR 30-59 ml/min 10/05/2012    Hypertension 10/05/2012    Hypothyroid 10/05/2012    Fullness of submandibular gland 11/12/2012    SLE (systemic lupus erythematosus) 11/27/2012    Edema 12/12/2012    Alopecia, other 12/19/2012    Acute myocardial infarction 12/28/2012    Chest pain syndrome 05/03/2013    Rectal bleed 05/09/2013    Chest pain at rest 03/27/2014    UTI (lower urinary tract infection) 04/17/2014    Diarrhea 10/09/2014    Hives 10/09/2014    History of polyuria 11/17/2014    Skin macule or macular rash 11/17/2014    Rash, skin 11/17/2014    Diarrhea 07/27/2015    Abscess 11/11/2015    Screening 04/14/2016    Leukopenia 04/25/2016    Caries 05/14/2014    CKD (chronic kidney disease), stage IV 01/23/2017     Past Medical History:   Diagnosis Date    Acute myocardial infarction     Allergy     Anatomical narrow angle glaucoma     Anemia     Aortic atherosclerosis     Arthritis     Cataract     Chronic kidney disease     Chronic sciatica of left side     Coronary artery disease     Depression     Dry eyes     GERD (gastroesophageal reflux disease)     History of colon cancer     Hyperlipidemia     Hypertension     Hypothyroidism     Left ventricular diastolic dysfunction with preserved systolic function     Lupus (systemic lupus erythematosus) 8/17/2012    Malnutrition 5/16/2017    Osteoarthritis of cervical spine 8/17/2012    Osteopenia     PAD (peripheral artery disease)      FH:  + heart disease, DM    SH:  Prior assistant  Retired  Single, 2 children  Son- DM  Prior tobacco- quit 1985    Review of Systems   Constitutional: Positive for activity change, appetite  change, fatigue and unexpected weight change. Negative for chills and fever.   HENT: Negative for congestion, hearing loss, mouth sores, nosebleeds, postnasal drip, rhinorrhea, sinus pain, sinus pressure, sneezing, sore throat and trouble swallowing.    Eyes: Negative for redness and visual disturbance (wears rx glasses).   Respiratory: Positive for shortness of breath (with exertion). Negative for cough and wheezing.    Cardiovascular: Positive for leg swelling (hx chf). Negative for chest pain and palpitations.   Gastrointestinal: Negative for abdominal distention, abdominal pain, blood in stool, constipation, diarrhea, nausea and vomiting.   Genitourinary: Positive for frequency. Negative for decreased urine volume, difficulty urinating, dysuria, hematuria and urgency.   Musculoskeletal: Positive for arthralgias and neck pain (has neck arthritis). Negative for back pain, gait problem, joint swelling, myalgias and neck stiffness.   Skin: Positive for wound (healing from recent fall). Negative for color change and pallor.   Neurological: Positive for numbness (hands, carpal tunnel). Negative for dizziness, weakness, light-headedness and headaches.   Hematological: Negative for adenopathy. Bruises/bleeds easily.       Objective:      Physical Exam   Constitutional: She is oriented to person, place, and time. She appears well-developed and well-nourished. No distress.   Presents alone   HENT:   Head: Normocephalic and atraumatic.   Right Ear: External ear normal.   Left Ear: External ear normal.   Nose: Nose normal.   Mouth/Throat: Oropharynx is clear and moist. No oropharyngeal exudate.   Eyes: Conjunctivae and EOM are normal. Pupils are equal, round, and reactive to light. Right eye exhibits no discharge. Left eye exhibits no discharge. No scleral icterus. Right pupil is round and reactive. Left pupil is round and reactive.   Neck: Normal range of motion. Neck supple. No JVD present. No tracheal deviation present.  No thyromegaly present.   Cardiovascular: Normal rate, regular rhythm, normal heart sounds and intact distal pulses.  Exam reveals no gallop and no friction rub.    No murmur heard.  Pulmonary/Chest: Effort normal and breath sounds normal. No respiratory distress. She has no wheezes. She has no rales.   Abdominal: Soft. Bowel sounds are normal. She exhibits no distension and no mass. There is no hepatosplenomegaly. There is no tenderness. There is no rebound and no guarding.   No organomegaly   Musculoskeletal: Normal range of motion. She exhibits no edema or tenderness.   Lymphadenopathy:        Head (right side): No submandibular adenopathy present.        Head (left side): No submandibular adenopathy present.     She has no cervical adenopathy.        Right cervical: No superficial cervical, no deep cervical and no posterior cervical adenopathy present.       Left cervical: No superficial cervical, no deep cervical and no posterior cervical adenopathy present.        Right: No inguinal and no supraclavicular adenopathy present.        Left: No inguinal and no supraclavicular adenopathy present.   Neurological: She is alert and oriented to person, place, and time. She has normal strength and normal reflexes. No cranial nerve deficit or sensory deficit. Coordination normal.   Skin: Skin is warm and dry. No bruising, no lesion, no petechiae and no rash noted. She is not diaphoretic. No erythema. No pallor.   Psychiatric: She has a normal mood and affect. Her behavior is normal. Judgment and thought content normal. Her mood appears not anxious. She does not exhibit a depressed mood.   Nursing note and vitals reviewed.    Labs- reviewed  Assessment:       1. Anemia of other chronic disease        Plan:     1. We discussed differential for anemia and broke it down into categories of production issues, marrow issues and distribution issues.  She has multiple underling co-morbidities that may be contributing that we also  discussed  We recommend additional labs today and a bone marrow biopsy with chronic anemia and mildly increased monocytes  Will have her return post these tests to further discuss.

## 2017-09-30 LAB — STFR SERPL-MCNC: 3.6 MG/L

## 2017-10-02 ENCOUNTER — PATIENT MESSAGE (OUTPATIENT)
Dept: HEMATOLOGY/ONCOLOGY | Facility: CLINIC | Age: 72
End: 2017-10-02

## 2017-10-02 LAB — ERYTHROPOIETIN: 9.4 MIU/ML

## 2017-10-05 ENCOUNTER — PATIENT MESSAGE (OUTPATIENT)
Dept: HEMATOLOGY/ONCOLOGY | Facility: CLINIC | Age: 72
End: 2017-10-05

## 2017-10-09 ENCOUNTER — PATIENT MESSAGE (OUTPATIENT)
Dept: CARDIOLOGY | Facility: CLINIC | Age: 72
End: 2017-10-09

## 2017-10-09 ENCOUNTER — TELEPHONE (OUTPATIENT)
Dept: HEMATOLOGY/ONCOLOGY | Facility: CLINIC | Age: 72
End: 2017-10-09

## 2017-10-09 NOTE — TELEPHONE ENCOUNTER
Message fwd to provider.       ----- Message from Carmen Patterson sent at 10/9/2017  8:02 AM CDT -----  Contact: Pt  Pt calling stating that she was waiting on a call from  regarding her labs and her treatment plan.    Pt call back but number [phone]

## 2017-10-10 ENCOUNTER — OFFICE VISIT (OUTPATIENT)
Dept: FAMILY MEDICINE | Facility: CLINIC | Age: 72
End: 2017-10-10
Payer: MEDICARE

## 2017-10-10 ENCOUNTER — TELEPHONE (OUTPATIENT)
Dept: HEMATOLOGY/ONCOLOGY | Facility: CLINIC | Age: 72
End: 2017-10-10

## 2017-10-10 VITALS
TEMPERATURE: 99 F | SYSTOLIC BLOOD PRESSURE: 160 MMHG | HEART RATE: 66 BPM | BODY MASS INDEX: 22.2 KG/M2 | WEIGHT: 130.06 LBS | DIASTOLIC BLOOD PRESSURE: 60 MMHG | HEIGHT: 64 IN

## 2017-10-10 DIAGNOSIS — Z23 NEED FOR PROPHYLACTIC VACCINATION AND INOCULATION AGAINST INFLUENZA: Primary | ICD-10-CM

## 2017-10-10 DIAGNOSIS — R21 RASH: ICD-10-CM

## 2017-10-10 PROCEDURE — 99214 OFFICE O/P EST MOD 30 MIN: CPT | Mod: 25,S$GLB,, | Performed by: FAMILY MEDICINE

## 2017-10-10 PROCEDURE — 90662 IIV NO PRSV INCREASED AG IM: CPT | Mod: S$GLB,,, | Performed by: FAMILY MEDICINE

## 2017-10-10 PROCEDURE — 99499 UNLISTED E&M SERVICE: CPT | Mod: HCNC,S$GLB,, | Performed by: FAMILY MEDICINE

## 2017-10-10 PROCEDURE — G0008 ADMIN INFLUENZA VIRUS VAC: HCPCS | Mod: S$GLB,,, | Performed by: FAMILY MEDICINE

## 2017-10-10 PROCEDURE — 99999 PR PBB SHADOW E&M-EST. PATIENT-LVL V: CPT | Mod: PBBFAC,,, | Performed by: FAMILY MEDICINE

## 2017-10-10 PROCEDURE — 88305 TISSUE EXAM BY PATHOLOGIST: CPT | Performed by: PATHOLOGY

## 2017-10-10 PROCEDURE — 88342 IMHCHEM/IMCYTCHM 1ST ANTB: CPT | Mod: 26,,, | Performed by: PATHOLOGY

## 2017-10-10 PROCEDURE — 88312 SPECIAL STAINS GROUP 1: CPT | Mod: 26,,, | Performed by: PATHOLOGY

## 2017-10-10 PROCEDURE — 11100 PR BIOPSY OF SKIN LESION: CPT | Mod: S$GLB,,, | Performed by: FAMILY MEDICINE

## 2017-10-10 PROCEDURE — 88341 IMHCHEM/IMCYTCHM EA ADD ANTB: CPT | Mod: 26,,, | Performed by: PATHOLOGY

## 2017-10-10 NOTE — TELEPHONE ENCOUNTER
----- Message from Anju Pham MD sent at 10/10/2017 11:33 AM CDT -----  Contact: Pt  Called patient  Needs to proceed with bone marrow biopsy  See me 1-2 weeks post    ----- Message -----  From: Josephine Forrest  Sent: 10/9/2017   8:07 AM  To: Anju Pham MD        ----- Message -----  From: Carmen Patterson  Sent: 10/9/2017   8:02 AM  To: Ankit REAGAN Staff    Pt calling stating that she was waiting on a call from  regarding her labs and her treatment plan.    Pt call back but number [phone]

## 2017-10-11 ENCOUNTER — TELEPHONE (OUTPATIENT)
Dept: HEMATOLOGY/ONCOLOGY | Facility: CLINIC | Age: 72
End: 2017-10-11

## 2017-10-11 DIAGNOSIS — D63.8 ANEMIA, CHRONIC DISEASE: Primary | ICD-10-CM

## 2017-10-11 NOTE — TELEPHONE ENCOUNTER
Signed.  Lolita Dunbar DNP, NP  Hematology/Oncology  -----------  Called pt to schedule BMBX , pt was told to report to the second floor in the main hospital Same Day Surgery Dept. Pt was told to be NPO starting at midnight the day prior to surgery. Pt was advised to hold all blood thinning medications prior to  BMBX & was advised to have a ride home. Pt voiced verbal understanding of these directions. Will also mail instruction sheet to pt's home.  Case request pended and routed to Lolita Dunbar.    Keely Friend

## 2017-10-11 NOTE — TELEPHONE ENCOUNTER
Message fwd to Keely Friend---bmbx coordinator.       ----- Message from Seb Scott sent at 10/11/2017  8:06 AM CDT -----  Contact: Pt   Pt would like a call back from nurse    Pt states she is expecting call back in ref to scheduling bone marrow biopsy     Pt can be reached at [phone]

## 2017-10-11 NOTE — TELEPHONE ENCOUNTER
Returned call to pt.   Informed pt that post bmbx f/u scheduled 11/6 at 9:30.   Pt verbalized understanding.           ----- Message from Marc Ramirez sent at 10/11/2017  1:27 PM CDT -----  Contact: Pt  Pt will like call from Josephine regarding appt     Contact::[phone]

## 2017-10-11 NOTE — PROGRESS NOTES
Subjective:       Patient ID: Ewelina Arnold is a 71 y.o. female.    Chief Complaint: Skin Problem and Foot Swelling (bilateral)   patient complaining with strange serpiginous raised rash on her right upper arm  She claims that the rash is been there over week as nonpruritic rash is also nonresponsive to over-the-counter hydrocortisone cream  HPI see above  Review of Systems   Constitutional: Negative.    HENT: Negative.    Eyes: Negative.    Respiratory: Negative.    Cardiovascular: Positive for leg swelling.   Gastrointestinal: Negative.    Endocrine: Negative.    Genitourinary: Negative.    Musculoskeletal: Positive for joint swelling.   Skin: Positive for rash.   Allergic/Immunologic: Negative.    Neurological: Negative.    Hematological: Negative.    Psychiatric/Behavioral: Negative.        Objective:      Physical Exam   Constitutional: She is oriented to person, place, and time. She appears well-developed and well-nourished. No distress.   HENT:   Head: Normocephalic and atraumatic.   Right Ear: External ear normal.   Left Ear: External ear normal.   Nose: Nose normal.   Mouth/Throat: Oropharynx is clear and moist.   Eyes: Conjunctivae and EOM are normal. Pupils are equal, round, and reactive to light.   Neck: Normal range of motion. Neck supple. No JVD present. No thyromegaly present.   Cardiovascular: Normal rate, regular rhythm and normal heart sounds.  Exam reveals no gallop and no friction rub.    No murmur heard.  Pulmonary/Chest: Effort normal and breath sounds normal. No respiratory distress. She has no wheezes. She has no rales. She exhibits no tenderness.   Abdominal: Soft. Bowel sounds are normal. She exhibits no distension and no mass. There is no tenderness. There is no guarding.   Musculoskeletal: Normal range of motion. She exhibits edema. She exhibits no tenderness or deformity.   Neurological: She is alert and oriented to person, place, and time. She displays normal reflexes. No cranial nerve  deficit or sensory deficit. She exhibits normal muscle tone. Coordination normal.   Skin: Skin is warm and dry. Rash noted. She is not diaphoretic. There is erythema. No pallor.   Psychiatric: She has a normal mood and affect. Her behavior is normal. Judgment and thought content normal.   Nursing note and vitals reviewed.      Assessment:       1. Need for prophylactic vaccination and inoculation against influenza    2. Rash     3.     Anemia  4.      Renal insufficiency  5.      History of SLE  6.      Colon cancer in remission  Plan:     with patient's permission Punch biopsy of the rash was performed under sterile techniques.  The area of the rash to be biopsied was cleaned with Betadine after infiltration of 2 cc of 1% lidocaine with epinephrine and  Adequate anesthesia was obtained Punch biopsy was performed and cautery was performed with silver nitrate sticks pressure bandage was placed.  Minimal blood loss.  Specimen was submitted to pathology.  Patient was advised to keep bandage in place for 48 hours then she may perform   daily local wound care to the area keeping it covered until it heals completely.  Patient tolerated procedure without any difficulty.  Further med card dated 10/10/2017  Flu Vaccine - High Dose (PF) (65+)   TENS units Sravanthi 1 application, Daily PRN        rosuvastatin (CRESTOR) 40 MG Tab 40 mg, Nightly        omeprazole (PRILOSEC) 40 MG capsule 40 mg, Daily        nystatin-triamcinolone (MYCOLOG II) cream 4 times daily        nystatin (MYCOSTATIN) ointment 2 times daily        nitroGLYCERIN (NITROSTAT) 0.4 MG SL tablet         multivitamin capsule 1 capsule, Daily        mometasone (ELOCON) 0.1 % ointment () Daily        meclizine (ANTIVERT) 12.5 mg tablet 12.5 mg, 3 times daily PRN        levothyroxine (SYNTHROID) 75 MCG tablet 75 mcg, Before breakfast        latanoprost 0.005 % ophthalmic solution         ipratropium (ATROVENT) 0.03 % nasal spray 2 spray, 2 times daily PRN         hyoscyamine (LEVSIN/SL) 0.125 mg Subl 0.125 mg, 2 times daily PRN        hydroxychloroquine (PLAQUENIL) 200 mg tablet        Contact patient results of all testing when available.

## 2017-10-12 ENCOUNTER — TELEPHONE (OUTPATIENT)
Dept: CARDIOLOGY | Facility: CLINIC | Age: 72
End: 2017-10-12

## 2017-10-12 NOTE — TELEPHONE ENCOUNTER
----- Message from Kelli Carpenter MA sent at 10/12/2017  4:31 PM CDT -----  Contact: patient called  The patient would like to talk to you about her e mail. Last visit was on 8- Dr. Jameson. Thank you.

## 2017-10-12 NOTE — TELEPHONE ENCOUNTER
Dr. Wyman, The pt says that she sent you an e-mail regarding her swollen feet and legs. I offered her an appt and she said that her co-pay for her appt would be $50.00, and she has another appt that the co-pay will be $200.00 coming up. BP at her appt with her PCP on 10-10-17 was 160/60 and HR 66 - says that she was nervous about her appt. The pt is requesting that you call her. Please advise. Thanks, Marianela

## 2017-10-13 ENCOUNTER — TELEPHONE (OUTPATIENT)
Dept: HEMATOLOGY/ONCOLOGY | Facility: CLINIC | Age: 72
End: 2017-10-13

## 2017-10-13 NOTE — TELEPHONE ENCOUNTER
Message routed to Keely Friend for clarification of bmbx time for Monday.         ----- Message from Shima Herrera sent at 10/13/2017  9:01 AM CDT -----  Contact: self  Patient states need to speak with nurse regarding surgery and Bone marrow Biopsy scheduled for Monday.   Pt states need time of arrival and instructions of procedure.  Please call pt for more detail info [phone]

## 2017-10-16 ENCOUNTER — SURGERY (OUTPATIENT)
Age: 72
End: 2017-10-16

## 2017-10-16 ENCOUNTER — PATIENT MESSAGE (OUTPATIENT)
Dept: CARDIOLOGY | Facility: CLINIC | Age: 72
End: 2017-10-16

## 2017-10-16 ENCOUNTER — ANESTHESIA (OUTPATIENT)
Dept: SURGERY | Facility: HOSPITAL | Age: 72
End: 2017-10-16
Payer: MEDICARE

## 2017-10-16 ENCOUNTER — ANESTHESIA EVENT (OUTPATIENT)
Dept: SURGERY | Facility: HOSPITAL | Age: 72
End: 2017-10-16
Payer: MEDICARE

## 2017-10-16 ENCOUNTER — HOSPITAL ENCOUNTER (OUTPATIENT)
Facility: HOSPITAL | Age: 72
Discharge: HOME | End: 2017-10-16
Attending: INTERNAL MEDICINE | Admitting: INTERNAL MEDICINE
Payer: MEDICARE

## 2017-10-16 VITALS
HEART RATE: 61 BPM | SYSTOLIC BLOOD PRESSURE: 176 MMHG | DIASTOLIC BLOOD PRESSURE: 80 MMHG | RESPIRATION RATE: 16 BRPM | TEMPERATURE: 98 F | BODY MASS INDEX: 21.66 KG/M2 | HEIGHT: 65 IN | OXYGEN SATURATION: 97 % | WEIGHT: 130 LBS

## 2017-10-16 DIAGNOSIS — D64.9 ANEMIA: ICD-10-CM

## 2017-10-16 DIAGNOSIS — D64.89 ANEMIA DUE TO OTHER CAUSE, NOT CLASSIFIED: Primary | ICD-10-CM

## 2017-10-16 LAB — BONE MARROW WRIGHT STAIN COMMENT: NORMAL

## 2017-10-16 PROCEDURE — 38221 DX BONE MARROW BIOPSIES: CPT | Mod: LT,,, | Performed by: NURSE PRACTITIONER

## 2017-10-16 PROCEDURE — 88364 INSITU HYBRIDIZATION (FISH): CPT | Mod: 26,,, | Performed by: PATHOLOGY

## 2017-10-16 PROCEDURE — 88341 IMHCHEM/IMCYTCHM EA ADD ANTB: CPT | Mod: 26,59,, | Performed by: PATHOLOGY

## 2017-10-16 PROCEDURE — 71000044 HC DOSC ROUTINE RECOVERY FIRST HOUR: Performed by: INTERNAL MEDICINE

## 2017-10-16 PROCEDURE — 88185 FLOWCYTOMETRY/TC ADD-ON: CPT | Mod: 59 | Performed by: PATHOLOGY

## 2017-10-16 PROCEDURE — 88184 FLOWCYTOMETRY/ TC 1 MARKER: CPT | Performed by: PATHOLOGY

## 2017-10-16 PROCEDURE — D9220A PRA ANESTHESIA: Mod: CRNA,,, | Performed by: NURSE ANESTHETIST, CERTIFIED REGISTERED

## 2017-10-16 PROCEDURE — 88189 FLOWCYTOMETRY/READ 16 & >: CPT | Mod: ,,, | Performed by: PATHOLOGY

## 2017-10-16 PROCEDURE — 25000003 PHARM REV CODE 250: Performed by: INTERNAL MEDICINE

## 2017-10-16 PROCEDURE — 88305 TISSUE EXAM BY PATHOLOGIST: CPT | Mod: 26,,, | Performed by: PATHOLOGY

## 2017-10-16 PROCEDURE — 88291 CYTO/MOLECULAR REPORT: CPT | Mod: 59

## 2017-10-16 PROCEDURE — D9220A PRA ANESTHESIA: Mod: ANES,,, | Performed by: ANESTHESIOLOGY

## 2017-10-16 PROCEDURE — 71000015 HC POSTOP RECOV 1ST HR: Performed by: INTERNAL MEDICINE

## 2017-10-16 PROCEDURE — 37000009 HC ANESTHESIA EA ADD 15 MINS: Performed by: INTERNAL MEDICINE

## 2017-10-16 PROCEDURE — 88365 INSITU HYBRIDIZATION (FISH): CPT | Mod: 26,,, | Performed by: PATHOLOGY

## 2017-10-16 PROCEDURE — 88264 CHROMOSOME ANALYSIS 20-25: CPT

## 2017-10-16 PROCEDURE — 88305 TISSUE EXAM BY PATHOLOGIST: CPT | Performed by: PATHOLOGY

## 2017-10-16 PROCEDURE — 37000008 HC ANESTHESIA 1ST 15 MINUTES: Performed by: INTERNAL MEDICINE

## 2017-10-16 PROCEDURE — 88313 SPECIAL STAINS GROUP 2: CPT

## 2017-10-16 PROCEDURE — 88360 TUMOR IMMUNOHISTOCHEM/MANUAL: CPT | Mod: 26,,, | Performed by: PATHOLOGY

## 2017-10-16 PROCEDURE — 63600175 PHARM REV CODE 636 W HCPCS: Performed by: NURSE ANESTHETIST, CERTIFIED REGISTERED

## 2017-10-16 PROCEDURE — 25000003 PHARM REV CODE 250: Performed by: ANESTHESIOLOGY

## 2017-10-16 PROCEDURE — 36000704 HC OR TIME LEV I 1ST 15 MIN: Performed by: INTERNAL MEDICINE

## 2017-10-16 PROCEDURE — 88271 CYTOGENETICS DNA PROBE: CPT

## 2017-10-16 PROCEDURE — 85097 BONE MARROW INTERPRETATION: CPT | Mod: ,,, | Performed by: PATHOLOGY

## 2017-10-16 PROCEDURE — 36000705 HC OR TIME LEV I EA ADD 15 MIN: Performed by: INTERNAL MEDICINE

## 2017-10-16 PROCEDURE — 88311 DECALCIFY TISSUE: CPT | Mod: 26,,, | Performed by: PATHOLOGY

## 2017-10-16 PROCEDURE — 88237 TISSUE CULTURE BONE MARROW: CPT

## 2017-10-16 PROCEDURE — 88342 IMHCHEM/IMCYTCHM 1ST ANTB: CPT | Mod: 26,59,, | Performed by: PATHOLOGY

## 2017-10-16 PROCEDURE — 88313 SPECIAL STAINS GROUP 2: CPT | Mod: 26,,, | Performed by: PATHOLOGY

## 2017-10-16 RX ORDER — LIDOCAINE HYDROCHLORIDE 10 MG/ML
1 INJECTION, SOLUTION EPIDURAL; INFILTRATION; INTRACAUDAL; PERINEURAL ONCE
Status: COMPLETED | OUTPATIENT
Start: 2017-10-16 | End: 2017-10-16

## 2017-10-16 RX ORDER — PROPOFOL 10 MG/ML
VIAL (ML) INTRAVENOUS
Status: DISCONTINUED | OUTPATIENT
Start: 2017-10-16 | End: 2017-10-16

## 2017-10-16 RX ORDER — PROPOFOL 10 MG/ML
VIAL (ML) INTRAVENOUS CONTINUOUS PRN
Status: DISCONTINUED | OUTPATIENT
Start: 2017-10-16 | End: 2017-10-16

## 2017-10-16 RX ORDER — LIDOCAINE HYDROCHLORIDE 10 MG/ML
INJECTION, SOLUTION EPIDURAL; INFILTRATION; INTRACAUDAL; PERINEURAL
Status: DISCONTINUED | OUTPATIENT
Start: 2017-10-16 | End: 2017-10-16 | Stop reason: HOSPADM

## 2017-10-16 RX ORDER — LIDOCAINE HCL/PF 100 MG/5ML
SYRINGE (ML) INTRAVENOUS
Status: DISCONTINUED | OUTPATIENT
Start: 2017-10-16 | End: 2017-10-16

## 2017-10-16 RX ORDER — SODIUM CHLORIDE 9 MG/ML
INJECTION, SOLUTION INTRAVENOUS CONTINUOUS
Status: DISCONTINUED | OUTPATIENT
Start: 2017-10-16 | End: 2017-10-16 | Stop reason: HOSPADM

## 2017-10-16 RX ADMIN — PROPOFOL 30 MG: 10 INJECTION, EMULSION INTRAVENOUS at 11:10

## 2017-10-16 RX ADMIN — SODIUM CHLORIDE: 0.9 INJECTION, SOLUTION INTRAVENOUS at 11:10

## 2017-10-16 RX ADMIN — LIDOCAINE HYDROCHLORIDE 5 ML: 10 INJECTION, SOLUTION EPIDURAL; INFILTRATION; INTRACAUDAL; PERINEURAL at 11:10

## 2017-10-16 RX ADMIN — LIDOCAINE HYDROCHLORIDE 10 MG: 10 INJECTION, SOLUTION EPIDURAL; INFILTRATION; INTRACAUDAL; PERINEURAL at 11:10

## 2017-10-16 RX ADMIN — PROPOFOL 50 MCG/KG/MIN: 10 INJECTION, EMULSION INTRAVENOUS at 11:10

## 2017-10-16 RX ADMIN — LIDOCAINE HYDROCHLORIDE 75 MG: 20 INJECTION, SOLUTION INTRAVENOUS at 11:10

## 2017-10-16 NOTE — ANESTHESIA POSTPROCEDURE EVALUATION
"Anesthesia Post Evaluation    Patient: Ewelina Arnold    Procedure(s) Performed: Procedure(s) (LRB):  BIOPSY-BONE MARROW (Left)    Final Anesthesia Type: general  Patient location during evaluation: PACU  Patient participation: Yes- Able to Participate  Level of consciousness: awake and alert  Post-procedure vital signs: reviewed and stable  Pain management: adequate  Airway patency: patent  PONV status at discharge: No PONV  Anesthetic complications: no      Cardiovascular status: blood pressure returned to baseline  Respiratory status: unassisted  Hydration status: euvolemic  Follow-up not needed.        Visit Vitals  BP (!) 176/80 (BP Location: Right arm, Patient Position: Sitting)   Pulse 61   Temp 36.4 °C (97.6 °F) (Temporal)   Resp 16   Ht 5' 5" (1.651 m)   Wt 59 kg (130 lb)   SpO2 97%   BMI 21.63 kg/m²       Pain/Juan Francisco Score: Pain Assessment Performed: Yes (10/16/2017 12:00 PM)  Presence of Pain: denies (10/16/2017 12:00 PM)  Juan Francisco Score: 10 (10/16/2017 12:00 PM)      "

## 2017-10-16 NOTE — PROCEDURES
PROCEDURE NOTE:  Bone Marrow Biopsy  Date: 10/16/17   Indication: Anemia  Consent: Informed consent was obtained from patient.  Timeout: Done and documented.  Site: Left posterior illiac crest.  Position: R lateral  Prep: Betadine.  Needle used: 11 gauge Jamshidi needle.  Anesthetic: 1% lidocaine 5 cc.  Biopsy: The biopsy needle was introduced into the marrow cavity and an aspirate was obtained without complications and sent for flow cytometry, PCPD FISH, and cytogenetics. Three small core biopsies obtained without difficulty and sent for routine histologic examination.  Complications: None.  Disposition: The patient was discharged home when appropriate per anesthesia.  Minimal blood loss    Alayna Mao, JEANNE, FNP  Hematology/Bone Marrow Transplant

## 2017-10-16 NOTE — ANESTHESIA PREPROCEDURE EVALUATION
10/16/2017  Ewelina Arnold is a 71 y.o., female.    Anesthesia Evaluation         Review of Systems  Anesthesia Hx:  No problems with previous Anesthesia   Social:  Former Smoker, No Alcohol Use    Cardiovascular:   Hypertension Valvular problems/Murmurs, AS CAD asymptomatic CABG/stent  PEDRO ECG has been reviewed. Most recent Echo shows AS mild to moderate with Grade 2 Duast Dysf    Pt had cardiac stent in 15'.    PEDRO with 1 FOS      CHF admission 1 year ago   Pulmonary:   Shortness of breath    Hepatic/GI:   GERD, well controlled        Physical Exam  General:  Well nourished    Airway/Jaw/Neck:  Airway Findings: Mouth Opening: Normal Tongue: Normal  General Airway Assessment: Adult  Mallampati: II  Improves to II with phonation.  TM Distance: Normal, at least 6 cm  Jaw/Neck Findings:  Neck ROM: Normal ROM      Dental:  Dental Findings: In tact, upper partial dentures   Chest/Lungs:  Chest/Lungs Findings: Clear to auscultation     Heart/Vascular:  Heart Findings: Heart Murmur  Systolic        Mental Status:  Mental Status Findings:  Cooperative         Anesthesia Plan  Type of Anesthesia, risks & benefits discussed:  Anesthesia Type:  general, MAC  Patient's Preference:   Intra-op Monitoring Plan: standard ASA monitors  Intra-op Monitoring Plan Comments:   Post Op Pain Control Plan:   Post Op Pain Control Plan Comments:   Induction:   IV  Beta Blocker:  Patient is on a Beta-Blocker and has received one dose within the past 24 hours (No further documentation required).       Informed Consent: Patient understands risks and agrees with Anesthesia plan.  Questions answered. Anesthesia consent signed with patient.  ASA Score: 3     Day of Surgery Review of History & Physical:    H&P update referred to the surgeon.         Ready For Surgery From Anesthesia Perspective.

## 2017-10-16 NOTE — DISCHARGE INSTRUCTIONS
Discharge instructions for having a Bone Marrow Aspiration / Biopsy    Keep Bandage in place for 24 hours.  - Do not shower or take a tube bath during this time. (you may sponge bathe).  - Call the nurse or physician for excessive bleeding or pain at biopsy site.  - You may take Tylenol as needed for pain.    You have received medication to sedate you.  - Do not drive a car or operate heavy machinery for the rest of the day.  - You may resume other activities as tolerated.    You can call [phone] for any problems during the hours of 8:00 AM-5:00PM.    For an emergency after 5:00 PM you can call [phone] and have the  page the Hematologist / Oncologist on call.

## 2017-10-16 NOTE — INTERVAL H&P NOTE
The patient has been examined and the H&P has been reviewed: Ok to proceed with BM Bx.    I concur with the findings and no changes have occurred since H&P was written.    Anesthesia/Surgery risks, benefits and alternative options discussed and understood by patient/family.          There are no hospital problems to display for this patient.

## 2017-10-16 NOTE — TRANSFER OF CARE
"Anesthesia Transfer of Care Note    Patient: Ewelina Arnold    Procedure(s) Performed: Procedure(s) (LRB):  BIOPSY-BONE MARROW (Left)    Patient location: [facility]    Anesthesia Type: general    Transport from OR: Transported from OR on room air with adequate spontaneous ventilation    Post pain: adequate analgesia    Post assessment: no apparent anesthetic complications    Post vital signs: stable    Level of consciousness: awake and responds to stimulation    Nausea/Vomiting: no nausea/vomiting    Complications: none    Transfer of care protocol was followed      Last vitals:   Visit Vitals  BP (!) 175/81 (BP Location: Right arm, Patient Position: Lying)   Pulse 72   Temp 36.4 °C (97.6 °F) (Oral)   Resp 18   Ht 5' 5" (1.651 m)   Wt 59 kg (130 lb)   SpO2 99%   BMI 21.63 kg/m²     "

## 2017-10-16 NOTE — PLAN OF CARE
Discharge instructions reviewed with pt, handouts given, verbalized understanding with no further questions at this time. NP spoke to pt reviewed procedure and answered questions aware she is awaiting biopsy results with MD telephone number provided per AVS sheet- will follow up with Dr. Pham in Nov. VSS on RA, no pain or nausea noted, tolerating po fluids without difficulty, no other complaints noted. Fall precautions reviewed, consents in chart, PIV to be removed at discharge.

## 2017-10-16 NOTE — DISCHARGE SUMMARY
[facility]  Bone Marrow Transplant  Discharge Summary      Patient Name: Ewelina Arnold  MRN: [id number]  Admission Date: 10/16/2017  Hospital Length of Stay: 0 days  Discharge Date and Time:  10/16/2017 11:25 AM  Attending Physician: Grant Santillan MD   Discharging Provider: Alayna Mao NP  Primary Care Provider: Kalie Walton MD      Procedure(s) (LRB):  BIOPSY-BONE MARROW (Left)     Hospital Course: Patient admitted to pre op today for a bone marrow aspiration and biopsy. Pt was consented for a bone marrow biopsy. Patient was sedated per anesthesia and a bone marrow biopsy and aspiration was performed in the OR (see procedure note). Patient was then transferred to post op and discharged home when appropriate per anesthesia.             Pending Diagnostic Studies:     None        There are no hospital problems to display for this patient.     Discharged Condition: good    Disposition: Home or Self Care    Follow Up: with Dr. Pham in clinic    Patient Instructions:     Diet general     Activity as tolerated     Call MD for:  temperature >100.4     Call MD for:  severe uncontrolled pain     Call MD for:  redness, tenderness, or signs of infection (pain, swelling, redness, odor or green/yellow discharge around incision site)     Remove dressing in 24 hours       Medications:  Reconciled Home Medications:   Current Discharge Medication List      CONTINUE these medications which have NOT CHANGED    Details   acetaminophen (TYLENOL) 500 MG tablet Take 500 mg by mouth every 6 (six) hours as needed for Pain.      amlodipine (NORVASC) 10 MG tablet TAKE 1 TABLET EVERY DAY  Qty: 90 tablet, Refills: 3      ammonium lactate 12 % Crea Apply 1 application topically 2 (two) times daily as needed.  Qty: 385 g, Refills: 2    Associated Diagnoses: Dermatitis      aspirin (ECOTRIN) 81 MG EC tablet Take 1 tablet (81 mg total) by mouth once daily.  Refills: 0      betamethasone dipropionate  (DIPROLENE) 0.05 % cream Apply topically 2 (two) times daily as needed.  Qty: 45 g, Refills: 2    Associated Diagnoses: Rash      butalbital-aspirin-caffeine -40 mg (FIORINAL) -40 mg Cap Take 1 capsule by mouth every 6 (six) hours as needed.       carvedilol (COREG) 12.5 MG tablet TAKE 1 TABLET TWICE DAILY WITH MEALS  Qty: 180 tablet, Refills: 3    Associated Diagnoses: Essential hypertension      cholestyramine (QUESTRAN) 4 gram packet Take 1 packet (4 g total) by mouth 3 (three) times daily with meals.  Qty: 90 packet, Refills: 4      citalopram (CELEXA) 20 MG tablet Take 1 tablet (20 mg total) by mouth once daily.  Qty: 90 tablet, Refills: 1      conjugated estrogens (PREMARIN) vaginal cream Use 1g nightly for two weeks, then 1g twice per week.  Qty: 30 g, Refills: 12    Associated Diagnoses: Vaginal atrophy      ferrous gluconate (FERGON) 325 MG Tab Take 1 tablet (325 mg total) by mouth daily with breakfast.  Refills: 0    Associated Diagnoses: Anemia, unspecified anemia type      fexofenadine (ALLEGRA) 180 MG tablet Take 1 tablet (180 mg total) by mouth once daily.  Qty: 30 tablet, Refills: 2    Associated Diagnoses: Dizziness      hydrALAZINE (APRESOLINE) 10 MG tablet TAKE 2 TABLETS EVERY 12 HOURS  Qty: 360 tablet, Refills: 3    Associated Diagnoses: Essential hypertension      hydrochlorothiazide (HYDRODIURIL) 25 MG tablet Take 1 tablet (25 mg total) by mouth once daily.  Qty: 90 tablet, Refills: 3      hydroxychloroquine (PLAQUENIL) 200 mg tablet TAKE 1 TABLET TWICE DAILY  Qty: 180 tablet, Refills: 1    Associated Diagnoses: Lupus (systemic lupus erythematosus)      hyoscyamine (LEVSIN/SL) 0.125 mg Subl Place 1 tablet (0.125 mg total) under the tongue 2 (two) times daily as needed.  Qty: 60 tablet, Refills: 1    Associated Diagnoses: Abdominal cramps      ipratropium (ATROVENT) 0.03 % nasal spray 2 sprays by Nasal route 2 (two) times daily as needed for Rhinitis.  Qty: 30 mL, Refills: 0     Associated Diagnoses: Allergic rhinitis with postnasal drip      latanoprost 0.005 % ophthalmic solution INSTILL 1 DROP INTO BOTH EYES EVERY DAY  Qty: 3 Bottle, Refills: 3      levothyroxine (SYNTHROID) 75 MCG tablet Take 1 tablet (75 mcg total) by mouth before breakfast.  Qty: 90 tablet, Refills: 1    Associated Diagnoses: Hypothyroidism, unspecified type      meclizine (ANTIVERT) 12.5 mg tablet Take 1 tablet (12.5 mg total) by mouth 3 (three) times daily as needed.  Qty: 30 tablet, Refills: 1    Associated Diagnoses: Dizziness      mometasone (ELOCON) 0.1 % ointment Apply topically once daily.  Qty: 45 g, Refills: 3      multivitamin capsule Take 1 capsule by mouth once daily.      nitroGLYCERIN (NITROSTAT) 0.4 MG SL tablet PLACE 1 TABLET UNDER THE TONGUE EVERY 5 MINUTES FOR 3 DOSES --IF NOT RELIEVED GO TO THE ER  Qty: 25 tablet, Refills: 6      nystatin (MYCOSTATIN) ointment Apply topically 2 (two) times daily.  Qty: 30 g, Refills: 2    Associated Diagnoses: Yeast infection      nystatin-triamcinolone (MYCOLOG II) cream Apply topically 4 (four) times daily.  Qty: 60 g, Refills: 0    Associated Diagnoses: Rash, skin      omeprazole (PRILOSEC) 40 MG capsule Take 1 capsule (40 mg total) by mouth once daily.  Qty: 90 capsule, Refills: 3      rosuvastatin (CRESTOR) 40 MG Tab Take 1 tablet (40 mg total) by mouth every evening.  Qty: 90 tablet, Refills: 3      TENS units Sravanthi 1 application by Misc.(Non-Drug; Combo Route) route daily as needed (pain).  Qty: 1 Device, Refills: 0    Associated Diagnoses: Osteoarthritis of cervical spine      triamcinolone acetonide 0.1% (KENALOG) 0.1 % paste PLACE ONTO TEETH TWICE DAILY  Qty: 5 g, Refills: 0      VITAMIN D2 50,000 unit capsule TAKE 1 CAPSULE (50,000 UNITS TOTAL) EVERY 30 DAYS  Qty: 3 capsule, Refills: 0    Associated Diagnoses: Vitamin D deficiency             Alayna Mao NP  Bone Marrow Transplant  [facility]

## 2017-10-17 ENCOUNTER — PATIENT MESSAGE (OUTPATIENT)
Dept: FAMILY MEDICINE | Facility: CLINIC | Age: 72
End: 2017-10-17

## 2017-10-17 ENCOUNTER — TELEPHONE (OUTPATIENT)
Dept: CARDIOLOGY | Facility: CLINIC | Age: 72
End: 2017-10-17

## 2017-10-17 LAB — BONE MARROW IRON STAIN COMMENT: NORMAL

## 2017-10-17 NOTE — TELEPHONE ENCOUNTER
----- Message from Louise Harry sent at 10/17/2017 11:09 AM CDT -----  Contact: pt  Pt says she's sending this message because she has not heard back from Dr. Wyman in ref to a message she sent via her [facility].  LOV 8/17/17 Dr. Jameson    Thanks

## 2017-10-17 NOTE — TELEPHONE ENCOUNTER
The pt said that she has sent  messages via [facility] e-mail and is aware that she wanted her to schedule an appt.Agreed to be see on Thurs.10/19/17 at 8:30 AM.

## 2017-10-18 LAB
BODY SITE - BONE MARROW: NORMAL
CLINICAL DIAGNOSIS - BONE MARROW: NORMAL
FLOW CYTOMETRY ANTIBODIES ANALYZED - BONE MARROW: NORMAL
FLOW CYTOMETRY COMMENT - BONE MARROW: NORMAL
FLOW CYTOMETRY INTERPRETATION - BONE MARROW: NORMAL

## 2017-10-19 ENCOUNTER — OFFICE VISIT (OUTPATIENT)
Dept: CARDIOLOGY | Facility: CLINIC | Age: 72
End: 2017-10-19
Payer: MEDICARE

## 2017-10-19 VITALS
BODY MASS INDEX: 21.62 KG/M2 | OXYGEN SATURATION: 98 % | DIASTOLIC BLOOD PRESSURE: 70 MMHG | HEART RATE: 70 BPM | SYSTOLIC BLOOD PRESSURE: 165 MMHG | HEIGHT: 66 IN | WEIGHT: 134.5 LBS

## 2017-10-19 DIAGNOSIS — I35.0 AORTIC STENOSIS, MILD: ICD-10-CM

## 2017-10-19 DIAGNOSIS — N18.9 ANEMIA ASSOCIATED WITH CHRONIC RENAL FAILURE: ICD-10-CM

## 2017-10-19 DIAGNOSIS — R06.09 DOE (DYSPNEA ON EXERTION): ICD-10-CM

## 2017-10-19 DIAGNOSIS — N18.30 CKD (CHRONIC KIDNEY DISEASE) STAGE 3, GFR 30-59 ML/MIN: ICD-10-CM

## 2017-10-19 DIAGNOSIS — D63.1 ANEMIA ASSOCIATED WITH CHRONIC RENAL FAILURE: ICD-10-CM

## 2017-10-19 DIAGNOSIS — Z98.61 POST PTCA: ICD-10-CM

## 2017-10-19 DIAGNOSIS — I51.89 LEFT VENTRICULAR DIASTOLIC DYSFUNCTION WITH PRESERVED SYSTOLIC FUNCTION: Primary | ICD-10-CM

## 2017-10-19 DIAGNOSIS — I10 ESSENTIAL HYPERTENSION: ICD-10-CM

## 2017-10-19 DIAGNOSIS — E78.2 MIXED DYSLIPIDEMIA: ICD-10-CM

## 2017-10-19 LAB
GENETICIST REVIEW: NORMAL
PLASMA CELL PROLIF RELEASED BY: NORMAL
PLASMA CELL PROLIF RESULT SUMMARY: NORMAL
PLASMA CELL PROLIF RESULT TABLE: NORMAL
REASON FOR REFERRAL, PLASMA CELL PROLIF (PCPD), FISH: NORMAL
REF LAB TEST METHOD: NORMAL
RESULTS, PLASMA CELL PROLIF (PCPD), FISH: NORMAL
SERVICE CMNT-IMP: NORMAL
SERVICE CMNT-IMP: NORMAL
SPECIMEN SOURCE: NORMAL
SPECIMEN, PLASMA CELL PROLIF (PCPD), FISH: NORMAL

## 2017-10-19 PROCEDURE — 99499 UNLISTED E&M SERVICE: CPT | Mod: HCNC,S$GLB,, | Performed by: INTERNAL MEDICINE

## 2017-10-19 PROCEDURE — 99214 OFFICE O/P EST MOD 30 MIN: CPT | Mod: S$GLB,,, | Performed by: INTERNAL MEDICINE

## 2017-10-19 PROCEDURE — 99999 PR PBB SHADOW E&M-EST. PATIENT-LVL III: CPT | Mod: PBBFAC,,, | Performed by: INTERNAL MEDICINE

## 2017-10-19 RX ORDER — CARVEDILOL 25 MG/1
25 TABLET ORAL 2 TIMES DAILY WITH MEALS
Qty: 180 TABLET | Refills: 3 | Status: ON HOLD | OUTPATIENT
Start: 2017-10-19 | End: 2017-11-19 | Stop reason: HOSPADM

## 2017-10-19 NOTE — PROGRESS NOTES
Subjective:   Patient ID:  Ewelina Arnold is a 71 y.o. female who presents for follow-up of Shortness of Breath (x 2 months fu) and Edema  71 year old female with hx of HTN, CAD s/p PCI to LAD 2011, HLD, history partial colectomy for polyps and colon CA(cancer free now), single kidney s/p kidney donation 1985, SLE. Last non-invasive assessment was 3/2016 by exercise echo with no evidence of WMA and normal EF. Last TTE 5/2017 with mod-severe AS (peak velocity 3.13 ms, PABLITO 1.0 cm2, peak gradient 39).  Today she presents with a 1 month history of increased PEDRO and SOB. States she cannot even handle daily activities at this point. Sometimes has to unhook her bra to breath better.  Uses 1 pillow to sleep, denies PND or orthopnea, denies palpitations. Denies any weight gain and checks weight daily. Notes some leg cramping after walking up 7 steps but this does not occur when she walks normally and gets short of breath.    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 Well into 170 and 180s whenever she check, continues to have Moderate PEDRO.   C/o leg swelling on and off worse in the evenings. Up to the mid shin.     Echo 2017    1 - Normal left ventricular systolic function (EF 65-70%).     2 - Concentric hypertrophy.     3 - No wall motion abnormalities.     4 - Severe left atrial enlargement.     5 - Impaired LV relaxation, elevated LAP (grade 2 diastolic dysfunction).     6 - Normal right ventricular systolic function .     7 - Moderate to severe aortic stenosis, PABLITO = 1.0 cm2, peak velocity = 3.13 m/s, mean gradient = 23 mmHg.     8 - Trivial to mild aortic regurgitation.     9 - Mild mitral regurgitation.     10 - Mild tricuspid regurgitation.     11 - The estimated PA systolic pressure is 30 mmHg.       Echo reviewed by 2 other echocardiographers that agrees that is this is indeed mild to moderate AS.     Patient Active Problem List   Diagnosis     "Lupus (systemic lupus erythematosus)    CAD (coronary artery disease)    Post PTCA    High risk medication use - Both Eyes    Residual stage angle-closure glaucoma - Both Eyes    Nuclear sclerosis - Both Eyes    Thyroid nodule    Postsurgical hypothyroidism    Osteopenia    Insufficiency of tear film of both eyes    Aortic atherosclerosis    Left ventricular diastolic dysfunction with preserved systolic function    GERD (gastroesophageal reflux disease)    History of colon cancer    Hyperlipidemia    Degeneration of lumbar or lumbosacral intervertebral disc    Hypertensive kidney disease with chronic kidney disease stage III    PEDRO (dyspnea on exertion)    Tortuous aorta    Positive dysphotopsia    Weight loss, abnormal    Pulmonary heart disease    Secondary hyperparathyroidism of renal origin    Peripheral arterial disease    Major depressive disorder with single episode, in full remission    Cervical spine arthritis    Malnutrition    Bilateral carotid artery disease    Stage 3 chronic kidney disease    Anemia of other chronic disease    Anemia associated with chronic renal failure    Aortic stenosis    Screen for colon cancer    Head trauma    Anemia     BP (!) 165/70 (BP Location: Left arm, Patient Position: Sitting, BP Method: Large (Automatic))   Pulse 70   Ht 5' 5.5" (1.664 m)   Wt 61 kg (134 lb 7.7 oz)   SpO2 98%   BMI 22.04 kg/m²   Body mass index is 22.04 kg/m².  CrCl cannot be calculated (Patient's most recent lab result is older than the maximum 7 days allowed.).    Lab Results   Component Value Date     09/19/2017    K 3.6 09/19/2017     09/19/2017    CO2 21 (L) 09/19/2017    BUN 35 (H) 09/19/2017    CREATININE 1.6 (H) 09/19/2017    GLU 93 09/19/2017    HGBA1C 5.7 01/24/2017    MG 1.8 03/12/2016    AST 27 08/17/2017    ALT 9 (L) 08/17/2017    ALBUMIN 2.7 (L) 08/17/2017    PROT 7.2 08/17/2017    BILITOT 0.3 08/17/2017    WBC 3.35 (L) 09/19/2017    HGB " 9.2 (L) 09/19/2017    HCT 28.7 (L) 09/19/2017    MCV 80 (L) 09/19/2017     09/19/2017    INR 1.1 05/09/2013    TSH 1.027 09/19/2017    CHOL 123 03/23/2017    HDL 39 (L) 03/23/2017    LDLCALC 68.2 03/23/2017    TRIG 79 03/23/2017       Current Outpatient Prescriptions   Medication Sig    acetaminophen (TYLENOL) 500 MG tablet Take 500 mg by mouth every 6 (six) hours as needed for Pain.    amlodipine (NORVASC) 10 MG tablet TAKE 1 TABLET EVERY DAY    ammonium lactate 12 % Crea Apply 1 application topically 2 (two) times daily as needed.    aspirin (ECOTRIN) 81 MG EC tablet Take 1 tablet (81 mg total) by mouth once daily. (Patient taking differently: Take 81 mg by mouth once daily. 1 tablet every other day)    betamethasone dipropionate (DIPROLENE) 0.05 % cream Apply topically 2 (two) times daily as needed.    butalbital-aspirin-caffeine -40 mg (FIORINAL) -40 mg Cap Take 1 capsule by mouth every 6 (six) hours as needed.     cholestyramine (QUESTRAN) 4 gram packet Take 1 packet (4 g total) by mouth 3 (three) times daily with meals.    citalopram (CELEXA) 20 MG tablet Take 1 tablet (20 mg total) by mouth once daily.    conjugated estrogens (PREMARIN) vaginal cream Use 1g nightly for two weeks, then 1g twice per week.    fexofenadine (ALLEGRA) 180 MG tablet Take 1 tablet (180 mg total) by mouth once daily.    hydrALAZINE (APRESOLINE) 10 MG tablet TAKE 2 TABLETS EVERY 12 HOURS    hydrochlorothiazide (HYDRODIURIL) 25 MG tablet Take 1 tablet (25 mg total) by mouth once daily.    hydroxychloroquine (PLAQUENIL) 200 mg tablet TAKE 1 TABLET TWICE DAILY    hyoscyamine (LEVSIN/SL) 0.125 mg Subl Place 1 tablet (0.125 mg total) under the tongue 2 (two) times daily as needed.    ipratropium (ATROVENT) 0.03 % nasal spray 2 sprays by Nasal route 2 (two) times daily as needed for Rhinitis.    latanoprost 0.005 % ophthalmic solution INSTILL 1 DROP INTO BOTH EYES EVERY DAY    levothyroxine (SYNTHROID) 75  MCG tablet Take 1 tablet (75 mcg total) by mouth before breakfast.    meclizine (ANTIVERT) 12.5 mg tablet Take 1 tablet (12.5 mg total) by mouth 3 (three) times daily as needed.    mometasone (ELOCON) 0.1 % ointment Apply topically once daily.    multivitamin capsule Take 1 capsule by mouth once daily.    nitroGLYCERIN (NITROSTAT) 0.4 MG SL tablet PLACE 1 TABLET UNDER THE TONGUE EVERY 5 MINUTES FOR 3 DOSES --IF NOT RELIEVED GO TO THE ER    nystatin (MYCOSTATIN) ointment Apply topically 2 (two) times daily.    nystatin-triamcinolone (MYCOLOG II) cream Apply topically 4 (four) times daily.    omeprazole (PRILOSEC) 40 MG capsule Take 1 capsule (40 mg total) by mouth once daily.    rosuvastatin (CRESTOR) 40 MG Tab Take 1 tablet (40 mg total) by mouth every evening.    TENS units Sravanthi 1 application by Misc.(Non-Drug; Combo Route) route daily as needed (pain).    triamcinolone acetonide 0.1% (KENALOG) 0.1 % paste PLACE ONTO TEETH TWICE DAILY (Patient taking differently: PLACE ONTO TEETH PRN)    VITAMIN D2 50,000 unit capsule TAKE 1 CAPSULE (50,000 UNITS TOTAL) EVERY 30 DAYS    carvedilol (COREG) 25 MG tablet Take 1 tablet (25 mg total) by mouth 2 (two) times daily with meals.     No current facility-administered medications for this visit.        Review of Systems   Constitution: Positive for decreased appetite, malaise/fatigue and weight loss (has lost 10 pounds. ). Negative for fever, weakness and weight gain.   Eyes: Negative.  Negative for blurred vision, double vision and visual disturbance.   Cardiovascular: Negative for chest pain, claudication, cyanosis, dyspnea on exertion, irregular heartbeat, leg swelling, near-syncope, orthopnea, palpitations, paroxysmal nocturnal dyspnea and syncope.   Respiratory: Negative.  Negative for cough, hemoptysis, shortness of breath, sleep disturbances due to breathing, snoring and wheezing.    Endocrine: Negative.  Negative for cold intolerance and heat intolerance.    Hematologic/Lymphatic: Bruises/bleeds easily.   Skin: Negative.    Musculoskeletal: Positive for joint pain. Negative for muscle weakness and myalgias.   Gastrointestinal: Negative for abdominal pain, constipation and diarrhea.   Genitourinary: Negative for bladder incontinence.   Neurological: Positive for headaches. Negative for difficulty with concentration, dizziness, light-headedness, loss of balance and numbness.   Psychiatric/Behavioral: Negative for depression.       Objective:   Physical Exam   Constitutional: She is oriented to person, place, and time. She appears well-developed and well-nourished. No distress.   Looks pale   HENT:   Head: Normocephalic and atraumatic.   Nose: Nose normal.   Mouth/Throat: Oropharynx is clear and moist. No oropharyngeal exudate.   Eyes: Conjunctivae and EOM are normal. Pupils are equal, round, and reactive to light. Right eye exhibits no discharge. Left eye exhibits no discharge. No scleral icterus.   Neck: Normal range of motion. Neck supple. No JVD present. No tracheal deviation present. No thyromegaly present.   Cardiovascular: Normal rate, regular rhythm, normal heart sounds and intact distal pulses.  Exam reveals no gallop and no friction rub.    No murmur heard.  Pulmonary/Chest: Effort normal and breath sounds normal. No stridor. No respiratory distress. She has no wheezes. She has no rales. She exhibits no tenderness.   Abdominal: Soft. Bowel sounds are normal. She exhibits no distension and no mass. There is no tenderness. There is no rebound and no guarding.   Musculoskeletal: Normal range of motion. She exhibits edema (no edema noted). She exhibits no tenderness.   Lymphadenopathy:     She has no cervical adenopathy.   Neurological: She is alert and oriented to person, place, and time. She has normal reflexes. No cranial nerve deficit. She exhibits normal muscle tone. Coordination normal.   Skin: Skin is warm. No rash noted. She is not diaphoretic. No erythema.  No pallor.   Psychiatric: She has a normal mood and affect. Her behavior is normal. Judgment and thought content normal.       Assessment:     1. Left ventricular diastolic dysfunction with preserved systolic function    2. Essential hypertension    3. PEDRO (dyspnea on exertion)    4. CKD (chronic kidney disease) stage 3, GFR 30-59 ml/min    5. Aortic stenosis, mild    6. Post PTCA    7. Mixed dyslipidemia    8. Anemia associated with chronic renal failure        Plan:   Suspect leg edema related to her Hypertensive episodes.  Her SOB is also related to her hypertensive episodes as well as anemia (w/up ongoing).   Watch BP at home and if increased will  Have to change medications.  Will await bone marrow biopsy results.   Counseled on importance of heart healthy diet low in saturated and trans fat and salt as well gradually starting a regular aerobic exercise regimen with goal of 30min 5x/week. Recommend BP diary. Call if systolic BP > 140 mmHg on checking repeatedly.  All meds reviewed and are appropriate

## 2017-10-23 LAB
CHROM BANDING METHOD: NORMAL
CHROMOSOME ANALYSIS BM ADDITIONAL INFORMATION: NORMAL
CHROMOSOME ANALYSIS BM RELEASED BY: NORMAL
CHROMOSOME ANALYSIS BM RESULT SUMMARY: NORMAL
CLINICAL CYTOGENETICIST REVIEW: NORMAL
KARYOTYP MAR: NORMAL
REASON FOR REFERRAL (NARRATIVE): NORMAL
REF LAB TEST METHOD: NORMAL
SPECIMEN SOURCE: NORMAL
SPECIMEN: NORMAL

## 2017-10-24 ENCOUNTER — TELEPHONE (OUTPATIENT)
Dept: CARDIOLOGY | Facility: CLINIC | Age: 72
End: 2017-10-24

## 2017-10-24 ENCOUNTER — TELEPHONE (OUTPATIENT)
Dept: PULMONOLOGY | Facility: CLINIC | Age: 72
End: 2017-10-24

## 2017-10-24 DIAGNOSIS — I35.0 MODERATE AORTIC STENOSIS: Primary | ICD-10-CM

## 2017-10-24 NOTE — TELEPHONE ENCOUNTER
",         LOV:10/19/17.Pt said that she missed your call this morning,she was at the dentist.She wanted to let you know that her sx's that she had when she saw you last week have not improved,she said if anything they have become worse.She said that she can barely walk b/c her legs feel like "jelly" and she is SOB even at rest.She feels like she has extra fluid,denies weight gain but has not been weighing herself but said she does have swelling to her LE's.Reports taking meds as Rx'd.Please advise.Thanks,Zoë  "

## 2017-10-24 NOTE — TELEPHONE ENCOUNTER
"----- Message from Deann Cantu sent at 10/24/2017 10:32 AM CDT -----  Contact: Pt called  Pt called, states she is returning Dr. Wyman's call and is having difficulty walking. "She states her legs feel like jelly and she is still short of breath". She states she is concerned. Ph for pt is [phone]. Pt of Dr. Wyman and LOV 10/19/17.Thank you  "

## 2017-10-25 DIAGNOSIS — I10 ESSENTIAL HYPERTENSION: ICD-10-CM

## 2017-10-25 RX ORDER — HYDRALAZINE HYDROCHLORIDE 10 MG/1
TABLET, FILM COATED ORAL
Qty: 360 TABLET | Refills: 3 | Status: SHIPPED | OUTPATIENT
Start: 2017-10-25 | End: 2017-12-07 | Stop reason: DRUGHIGH

## 2017-10-27 ENCOUNTER — TELEPHONE (OUTPATIENT)
Dept: CARDIOLOGY | Facility: CLINIC | Age: 72
End: 2017-10-27

## 2017-10-27 NOTE — TELEPHONE ENCOUNTER
----- Message from Elham Wyman MD sent at 10/27/2017  3:34 PM CDT -----  Contact: pt   plz see my note on her I orderd an echo for her today  ----- Message -----  From: Kirsten Jovel MA  Sent: 10/27/2017   3:20 PM  To: Elham Wyman MD    I do not have a order, what test do you want?  ----- Message -----  From: Jolie العلي  Sent: 10/27/2017   3:13 PM  To: [facility] Staff    Pt called because she was told by Dr. Wyman that she would be contacted to schedule an MRI.  She states she has not heard anything yet.  She can be reached @ [phone].  Thanks!!

## 2017-10-27 NOTE — TELEPHONE ENCOUNTER
I called the patient she is still very concerned about her SOB and her bone marrow result. It may be best to repeat her Echo, since there was a discrepancy between the results in the past to see if there is worsening of her AS. I discussed this with her and we will order another echo for now.

## 2017-10-29 ENCOUNTER — PATIENT MESSAGE (OUTPATIENT)
Dept: NEPHROLOGY | Facility: CLINIC | Age: 72
End: 2017-10-29

## 2017-10-30 ENCOUNTER — HOSPITAL ENCOUNTER (OUTPATIENT)
Dept: CARDIOLOGY | Facility: CLINIC | Age: 72
Discharge: HOME | End: 2017-10-30
Payer: MEDICARE

## 2017-10-30 ENCOUNTER — LAB VISIT (OUTPATIENT)
Dept: LAB | Facility: HOSPITAL | Age: 72
End: 2017-10-30
Attending: FAMILY MEDICINE
Payer: MEDICARE

## 2017-10-30 ENCOUNTER — OFFICE VISIT (OUTPATIENT)
Dept: FAMILY MEDICINE | Facility: CLINIC | Age: 72
End: 2017-10-30
Payer: MEDICARE

## 2017-10-30 VITALS
BODY MASS INDEX: 23.34 KG/M2 | DIASTOLIC BLOOD PRESSURE: 64 MMHG | WEIGHT: 136.69 LBS | SYSTOLIC BLOOD PRESSURE: 136 MMHG | TEMPERATURE: 98 F | HEIGHT: 64 IN

## 2017-10-30 DIAGNOSIS — Z79.899 HIGH RISK MEDICATION USE: ICD-10-CM

## 2017-10-30 DIAGNOSIS — I35.0 MODERATE AORTIC STENOSIS: ICD-10-CM

## 2017-10-30 DIAGNOSIS — Z79.899 HIGH RISK MEDICATION USE: Primary | ICD-10-CM

## 2017-10-30 LAB
ALBUMIN SERPL BCP-MCNC: 2.7 G/DL
ALP SERPL-CCNC: 75 U/L
ALT SERPL W/O P-5'-P-CCNC: 16 U/L
ANION GAP SERPL CALC-SCNC: 8 MMOL/L
AORTIC VALVE REGURGITATION: ABNORMAL
AORTIC VALVE STENOSIS: ABNORMAL
AST SERPL-CCNC: 38 U/L
BASOPHILS # BLD AUTO: 0.02 K/UL
BASOPHILS NFR BLD: 0.6 %
BILIRUB SERPL-MCNC: 0.2 MG/DL
BUN SERPL-MCNC: 40 MG/DL
CALCIUM SERPL-MCNC: 8.4 MG/DL
CHLORIDE SERPL-SCNC: 109 MMOL/L
CO2 SERPL-SCNC: 19 MMOL/L
CREAT SERPL-MCNC: 2 MG/DL
DIASTOLIC DYSFUNCTION: YES
DIFFERENTIAL METHOD: ABNORMAL
EOSINOPHIL # BLD AUTO: 0.1 K/UL
EOSINOPHIL NFR BLD: 3.9 %
ERYTHROCYTE [DISTWIDTH] IN BLOOD BY AUTOMATED COUNT: 14 %
EST. GFR  (AFRICAN AMERICAN): 28.3 ML/MIN/1.73 M^2
EST. GFR  (NON AFRICAN AMERICAN): 24.6 ML/MIN/1.73 M^2
ESTIMATED PA SYSTOLIC PRESSURE: 57.77
GLOBAL PERICARDIAL EFFUSION: ABNORMAL
GLUCOSE SERPL-MCNC: 93 MG/DL
HCT VFR BLD AUTO: 26.6 %
HGB BLD-MCNC: 8.4 G/DL
IMM GRANULOCYTES # BLD AUTO: 0.01 K/UL
IMM GRANULOCYTES NFR BLD AUTO: 0.3 %
LYMPHOCYTES # BLD AUTO: 0.8 K/UL
LYMPHOCYTES NFR BLD: 23.2 %
MCH RBC QN AUTO: 25.4 PG
MCHC RBC AUTO-ENTMCNC: 31.6 G/DL
MCV RBC AUTO: 80 FL
MITRAL VALVE REGURGITATION: ABNORMAL
MONOCYTES # BLD AUTO: 0.6 K/UL
MONOCYTES NFR BLD: 19 %
NEUTROPHILS # BLD AUTO: 1.8 K/UL
NEUTROPHILS NFR BLD: 53 %
NRBC BLD-RTO: 0 /100 WBC
PLATELET # BLD AUTO: 208 K/UL
PMV BLD AUTO: 10.8 FL
POTASSIUM SERPL-SCNC: 3.8 MMOL/L
PROT SERPL-MCNC: 6.5 G/DL
RBC # BLD AUTO: 3.31 M/UL
RETIRED EF AND QEF - SEE NOTES: 60 (ref 55–65)
SODIUM SERPL-SCNC: 136 MMOL/L
WBC # BLD AUTO: 3.36 K/UL

## 2017-10-30 PROCEDURE — 36415 COLL VENOUS BLD VENIPUNCTURE: CPT | Mod: PO

## 2017-10-30 PROCEDURE — 80053 COMPREHEN METABOLIC PANEL: CPT

## 2017-10-30 PROCEDURE — 99214 OFFICE O/P EST MOD 30 MIN: CPT | Mod: S$GLB,,, | Performed by: FAMILY MEDICINE

## 2017-10-30 PROCEDURE — 85025 COMPLETE CBC W/AUTO DIFF WBC: CPT

## 2017-10-30 PROCEDURE — 99999 PR PBB SHADOW E&M-EST. PATIENT-LVL III: CPT | Mod: PBBFAC,,, | Performed by: FAMILY MEDICINE

## 2017-10-30 PROCEDURE — 93306 TTE W/DOPPLER COMPLETE: CPT | Mod: S$GLB,,, | Performed by: INTERNAL MEDICINE

## 2017-10-30 RX ORDER — FUROSEMIDE 20 MG/1
TABLET ORAL
Qty: 5 TABLET | Refills: 0 | Status: SHIPPED | OUTPATIENT
Start: 2017-10-30 | End: 2017-10-30 | Stop reason: SDUPTHER

## 2017-10-30 RX ORDER — FUROSEMIDE 20 MG/1
TABLET ORAL
Qty: 45 TABLET | Refills: 0 | Status: SHIPPED | OUTPATIENT
Start: 2017-10-30 | End: 2017-11-01 | Stop reason: SDUPTHER

## 2017-10-31 ENCOUNTER — PATIENT MESSAGE (OUTPATIENT)
Dept: FAMILY MEDICINE | Facility: CLINIC | Age: 72
End: 2017-10-31

## 2017-10-31 ENCOUNTER — PATIENT MESSAGE (OUTPATIENT)
Dept: HEMATOLOGY/ONCOLOGY | Facility: CLINIC | Age: 72
End: 2017-10-31

## 2017-10-31 ENCOUNTER — TELEPHONE (OUTPATIENT)
Dept: CARDIOLOGY | Facility: CLINIC | Age: 72
End: 2017-10-31

## 2017-10-31 ENCOUNTER — TELEPHONE (OUTPATIENT)
Dept: NEPHROLOGY | Facility: CLINIC | Age: 72
End: 2017-10-31

## 2017-10-31 DIAGNOSIS — E03.9 HYPOTHYROIDISM, UNSPECIFIED TYPE: ICD-10-CM

## 2017-10-31 DIAGNOSIS — N18.30 CHRONIC KIDNEY DISEASE (CKD), STAGE III (MODERATE): Primary | ICD-10-CM

## 2017-10-31 NOTE — PROGRESS NOTES
Subjective:       Patient ID: Ewelina Arnold is a 71 y.o. female.    Chief Complaint: Foot Swelling (bilateral) and Leg Swelling (bilateral)   eczema, dyspnea on exertion  HPI see above  Review of Systems   Constitutional: Negative.    HENT: Negative.    Eyes: Negative.    Respiratory: Positive for shortness of breath.    Cardiovascular: Positive for leg swelling.   Gastrointestinal: Negative.    Endocrine: Negative.    Genitourinary: Negative.    Musculoskeletal: Negative.    Skin: Negative.    Allergic/Immunologic: Negative.    Neurological: Negative.    Hematological: Negative.    Psychiatric/Behavioral: Negative.        Objective:      Physical Exam   Constitutional: She is oriented to person, place, and time. She appears well-developed and well-nourished. She appears distressed.   HENT:   Head: Normocephalic and atraumatic.   Right Ear: External ear normal.   Left Ear: External ear normal.   Nose: Nose normal.   Mouth/Throat: Oropharynx is clear and moist.   Eyes: Conjunctivae and EOM are normal. Pupils are equal, round, and reactive to light.   Neck: Normal range of motion. Neck supple. No JVD present.   Cardiovascular: Normal rate, regular rhythm and normal heart sounds.    Pulmonary/Chest: Effort normal and breath sounds normal.   Abdominal: Soft. Bowel sounds are normal. She exhibits no distension. There is no tenderness. There is no rebound and no guarding.   Musculoskeletal: She exhibits edema.   Neurological: She is alert and oriented to person, place, and time. She displays normal reflexes. No cranial nerve deficit or sensory deficit. She exhibits normal muscle tone. Coordination normal.   Skin: Skin is warm and dry. No rash noted. No erythema. No pallor.   Psychiatric: She has a normal mood and affect. Her behavior is normal. Judgment and thought content normal.   Nursing note and vitals reviewed.      Assessment:       1. High risk medication use     2.     Dyspnea  3.      edema  Plan:     contact  patient results of testing are available.   CBC auto differential        Comprehensive metabolic panel         Further med card dated 10/30/2017   furosemide (LASIX) 20 MG tablet every other day for 1 week only        hydrALAZINE (APRESOLINE) 10 MG tablet        hydrochlorothiazide (HYDRODIURIL) 25 MG tablet         Contact cardiology for results of echocardiogram done today.    message was sent to Dr. merida regarding the patient's situation will contact the patient results of testing are available.

## 2017-10-31 NOTE — TELEPHONE ENCOUNTER
Called pt to inform her she dont need lab .    ----- Message from Jessica Leahy sent at 10/31/2017  9:35 AM CDT -----  Contact: Self [phone]  PT wants to know is she needs to have labs done today.

## 2017-11-01 DIAGNOSIS — N18.30 CHRONIC KIDNEY DISEASE, STAGE III (MODERATE): Primary | ICD-10-CM

## 2017-11-01 RX ORDER — LEVOTHYROXINE SODIUM 75 UG/1
75 TABLET ORAL
Qty: 90 TABLET | Refills: 0 | Status: SHIPPED | OUTPATIENT
Start: 2017-11-01 | End: 2017-11-02 | Stop reason: SDUPTHER

## 2017-11-01 RX ORDER — LEVOTHYROXINE SODIUM 75 UG/1
75 TABLET ORAL
Qty: 90 TABLET | Refills: 0 | Status: SHIPPED | OUTPATIENT
Start: 2017-11-01 | End: 2017-11-01 | Stop reason: SDUPTHER

## 2017-11-01 RX ORDER — AMLODIPINE BESYLATE 10 MG/1
10 TABLET ORAL DAILY
Qty: 90 TABLET | Refills: 0 | Status: SHIPPED | OUTPATIENT
Start: 2017-11-01 | End: 2017-11-01 | Stop reason: SDUPTHER

## 2017-11-01 RX ORDER — FUROSEMIDE 20 MG/1
20 TABLET ORAL 2 TIMES DAILY
Qty: 90 TABLET | Refills: 3 | Status: ON HOLD | OUTPATIENT
Start: 2017-11-01 | End: 2017-11-19 | Stop reason: HOSPADM

## 2017-11-01 RX ORDER — AMLODIPINE BESYLATE 10 MG/1
10 TABLET ORAL DAILY
Qty: 90 TABLET | Refills: 0 | Status: SHIPPED | OUTPATIENT
Start: 2017-11-01 | End: 2017-11-02 | Stop reason: SDUPTHER

## 2017-11-01 NOTE — TELEPHONE ENCOUNTER
Medications were sent to [facility] in error, patient would like them sent to [facility].

## 2017-11-02 RX ORDER — LEVOTHYROXINE SODIUM 75 UG/1
75 TABLET ORAL
Qty: 90 TABLET | Refills: 0 | Status: SHIPPED | OUTPATIENT
Start: 2017-11-02 | End: 2018-01-26 | Stop reason: SDUPTHER

## 2017-11-02 RX ORDER — AMLODIPINE BESYLATE 10 MG/1
10 TABLET ORAL DAILY
Qty: 90 TABLET | Refills: 0 | Status: ON HOLD | OUTPATIENT
Start: 2017-11-02 | End: 2017-11-30 | Stop reason: HOSPADM

## 2017-11-06 ENCOUNTER — TELEPHONE (OUTPATIENT)
Dept: CARDIOLOGY | Facility: CLINIC | Age: 72
End: 2017-11-06

## 2017-11-06 ENCOUNTER — OFFICE VISIT (OUTPATIENT)
Dept: HEMATOLOGY/ONCOLOGY | Facility: CLINIC | Age: 72
End: 2017-11-06
Payer: MEDICARE

## 2017-11-06 VITALS
DIASTOLIC BLOOD PRESSURE: 77 MMHG | RESPIRATION RATE: 19 BRPM | SYSTOLIC BLOOD PRESSURE: 177 MMHG | OXYGEN SATURATION: 94 % | WEIGHT: 136 LBS | BODY MASS INDEX: 23.35 KG/M2 | HEART RATE: 69 BPM | TEMPERATURE: 99 F

## 2017-11-06 DIAGNOSIS — D63.1 ANEMIA ASSOCIATED WITH CHRONIC RENAL FAILURE: Primary | ICD-10-CM

## 2017-11-06 DIAGNOSIS — Z85.038 HISTORY OF COLON CANCER: ICD-10-CM

## 2017-11-06 DIAGNOSIS — I35.0 SEVERE AORTIC STENOSIS: Primary | ICD-10-CM

## 2017-11-06 DIAGNOSIS — N18.9 ANEMIA ASSOCIATED WITH CHRONIC RENAL FAILURE: Primary | ICD-10-CM

## 2017-11-06 PROCEDURE — 99213 OFFICE O/P EST LOW 20 MIN: CPT | Mod: S$GLB,,, | Performed by: INTERNAL MEDICINE

## 2017-11-06 PROCEDURE — 99999 PR PBB SHADOW E&M-EST. PATIENT-LVL III: CPT | Mod: PBBFAC,,, | Performed by: INTERNAL MEDICINE

## 2017-11-06 NOTE — PROGRESS NOTES
Returns for follow up of anemia work up labs and bmbx  Labs support diagnosis of anemia chronic disease  She has plasma cells noted but not meeting criteria for multiple myeloma    BMBx:  CELLULARITY=50-60%, TRILINEAGE HEMATOPOIETIC ACTIVITY (M/E= 2.1:1).  NO DEFINITIVE MORPHOLOGIC EVIDENCE OF PLASMA CELL DYSCRASIA. SEE COMMENT.  INCREASED STORAGE IRON.  ADEQUATE NUMBER OF MEGAKARYOCYTES.  COMMENT: Flow cytometry analysis of bone marrow aspirate shows lymph gate(8.8%) containing T and B cells.  No B cell clonality or T-cell aberrancy is evident. Blast gate is not increased. Plasma cell study shows no light  chain restricted plasma cell population.  Immunohistochemical studies were performed on the clot and core biopsy for further architecture evaluation with  adequate positive and negative controls. Scattered mixed predominantly T cells (CD3 positive) and B cells (CD20  positive, cyclin D1 negative) are evident. About 6-9% plasma cells ( positive) are noted. In situ hybridization  for kappa and lambda light chain shows polytypic plasma cells. Findings are nondiagnostic for lymphoma or  plasma cell dyscrasia. Correlate clinically and with a cytogenetics report.    Leukemia/Lymphoma flow negative  Normal cytogenetics    Weight up to 134 lbs but patient notes mostly fluids- she states it used to go away at night  We will discuss with her cardiologist    15 minutes total

## 2017-11-06 NOTE — TELEPHONE ENCOUNTER
".Plz let the patient know that "Ewelina I spoke with the surgeon and the heart specialist, I would like you to officially see them for valve replacement. I cant talk to you today, but I will call you later in the week".   "

## 2017-11-09 ENCOUNTER — TELEPHONE (OUTPATIENT)
Dept: CARDIOLOGY | Facility: CLINIC | Age: 72
End: 2017-11-09

## 2017-11-09 NOTE — TELEPHONE ENCOUNTER
----- Message from Jolie العلي sent at 11/8/2017  4:13 PM CST -----  Contact: pt   Pt called to speak with you about the next step in her treatment.  She states she is supposed to be hearing from Dr. Ly but has not yet.  She can be reached @ [phone].  Thanks!!

## 2017-11-13 ENCOUNTER — TELEPHONE (OUTPATIENT)
Dept: CARDIOLOGY | Facility: CLINIC | Age: 72
End: 2017-11-13

## 2017-11-13 DIAGNOSIS — N18.30 CKD (CHRONIC KIDNEY DISEASE) STAGE 3, GFR 30-59 ML/MIN: Primary | ICD-10-CM

## 2017-11-14 ENCOUNTER — TELEPHONE (OUTPATIENT)
Dept: CARDIOLOGY | Facility: CLINIC | Age: 72
End: 2017-11-14

## 2017-11-14 ENCOUNTER — OFFICE VISIT (OUTPATIENT)
Dept: CARDIOTHORACIC SURGERY | Facility: CLINIC | Age: 72
DRG: 291 | End: 2017-11-14
Payer: MEDICARE

## 2017-11-14 VITALS
HEART RATE: 63 BPM | WEIGHT: 137.5 LBS | HEIGHT: 65 IN | DIASTOLIC BLOOD PRESSURE: 76 MMHG | BODY MASS INDEX: 22.91 KG/M2 | SYSTOLIC BLOOD PRESSURE: 155 MMHG | OXYGEN SATURATION: 98 % | TEMPERATURE: 98 F

## 2017-11-14 DIAGNOSIS — I35.0 NONRHEUMATIC AORTIC VALVE STENOSIS: Primary | ICD-10-CM

## 2017-11-14 PROCEDURE — 99499 UNLISTED E&M SERVICE: CPT | Mod: S$GLB,,, | Performed by: THORACIC SURGERY (CARDIOTHORACIC VASCULAR SURGERY)

## 2017-11-14 PROCEDURE — 99999 PR PBB SHADOW E&M-EST. PATIENT-LVL III: CPT | Mod: PBBFAC,,, | Performed by: THORACIC SURGERY (CARDIOTHORACIC VASCULAR SURGERY)

## 2017-11-14 PROCEDURE — 99205 OFFICE O/P NEW HI 60 MIN: CPT | Mod: S$GLB,,, | Performed by: THORACIC SURGERY (CARDIOTHORACIC VASCULAR SURGERY)

## 2017-11-14 NOTE — LETTER
November 14, 2017      Elham Wyman MD  [address]  [address]  [place] [address]           [facility] Counts include 234 beds at the [facility]  [address]  [place] [address]  Phone: [phone]          Patient: Ewelina Arnold   MR Number: [id number]   YOB: 1945   Date of Visit: 11/14/2017       Dear Dr. Elham Wyman:    Thank you for referring Ewelina Arnold to me for evaluation. Attached you will find relevant portions of my assessment and plan of care.    If you have questions, please do not hesitate to call me. I look forward to following Ewelina Arnold along with you.    Sincerely,    Pedrito Rob MD    Enclosure  CC:  No Recipients    If you would like to receive this communication electronically, please contact [email or web address] or [phone] to request more information on [employer] access.    For providers and/or their staff who would like to refer a patient to [facility], please contact us through our one-stop-shop provider referral line, [facility], at [phone].    If you feel you have received this communication in error or would no longer like to receive these types of communications, please e-mail [email or web address]

## 2017-11-14 NOTE — PROGRESS NOTES
History & Physical    SUBJECTIVE:     History of Present Illness:  Patient is a 71 y.o. female presents with hx of HTN, CAD s/p PCI to LAD 2011, HLD, history partial colectomy for polyps and colon CA(cancer free now), single kidney s/p kidney donation 1985, SLE. Last non-invasive assessment was 3/2016 by exercise echo with no evidence of WMA and normal EF. Last TTE 5/2017 with mod-severe AS (peak velocity 3.13 ms, PABLITO 1.0 cm2, peak gradient 39).Today she presents with a 1 month history of increased PEDRO and SOB. States she cannot even handle daily activities at this point. Sometimes has to unhook her bra to breath better. Uses 1 pillow to sleep, denies PND or orthopnea, denies palpitations. Denies any weight gain and checks weight daily. Notes some leg cramping after walking up 7 steps but this does not occur when she walks normally and gets short of breath.       Chief Complaint   Patient presents with    Consult       Review of patient's allergies indicates:   Allergen Reactions    Ace inhibitors      Other reaction(s): Lips Swelling    Vioxx [rofecoxib]      Other reaction(s): lips swelling    Bactrim [sulfamethoxazole-trimethoprim]      Pt would like this medication to be added due to elevation of creatinine with single kidney.    Arthrotec 50 [diclofenac-misoprostol]      Other reaction(s): Unknown    Bentyl [dicyclomine]      Not effective    Doxycycline      nausea    Imdur [isosorbide mononitrate] Nausea Only    Lomotil [diphenoxylate-atropine]      Stomach cramps, pt vomitted    Lozol [indapamide] Rash    Tramadol Nausea Only       Current Outpatient Prescriptions   Medication Sig Dispense Refill    acetaminophen (TYLENOL) 500 MG tablet Take 500 mg by mouth every 6 (six) hours as needed for Pain.      amLODIPine (NORVASC) 10 MG tablet Take 1 tablet (10 mg total) by mouth once daily. 90 tablet 0    ammonium lactate 12 % Crea Apply 1 application topically 2 (two) times daily as needed. 385 g 2     aspirin (ECOTRIN) 81 MG EC tablet Take 1 tablet (81 mg total) by mouth once daily. (Patient taking differently: Take 81 mg by mouth once daily. 1 tablet every other day)  0    betamethasone dipropionate (DIPROLENE) 0.05 % cream Apply topically 2 (two) times daily as needed. 45 g 2    butalbital-aspirin-caffeine -40 mg (FIORINAL) -40 mg Cap Take 1 capsule by mouth every 6 (six) hours as needed.       carvedilol (COREG) 25 MG tablet Take 1 tablet (25 mg total) by mouth 2 (two) times daily with meals. 180 tablet 3    cholestyramine (QUESTRAN) 4 gram packet Take 1 packet (4 g total) by mouth 3 (three) times daily with meals. 90 packet 4    citalopram (CELEXA) 20 MG tablet Take 1 tablet (20 mg total) by mouth once daily. 90 tablet 1    conjugated estrogens (PREMARIN) vaginal cream Use 1g nightly for two weeks, then 1g twice per week. 30 g 12    fexofenadine (ALLEGRA) 180 MG tablet Take 1 tablet (180 mg total) by mouth once daily. 30 tablet 2    furosemide (LASIX) 20 MG tablet Take 1 tablet (20 mg total) by mouth 2 (two) times daily. 90 tablet 3    hydrALAZINE (APRESOLINE) 10 MG tablet TAKE 2 TABLETS EVERY 12 HOURS 360 tablet 3    hydrochlorothiazide (HYDRODIURIL) 25 MG tablet Take 1 tablet (25 mg total) by mouth once daily. 90 tablet 3    hydroxychloroquine (PLAQUENIL) 200 mg tablet TAKE 1 TABLET TWICE DAILY 180 tablet 1    hyoscyamine (LEVSIN/SL) 0.125 mg Subl Place 1 tablet (0.125 mg total) under the tongue 2 (two) times daily as needed. 60 tablet 1    ipratropium (ATROVENT) 0.03 % nasal spray 2 sprays by Nasal route 2 (two) times daily as needed for Rhinitis. 30 mL 0    latanoprost 0.005 % ophthalmic solution INSTILL 1 DROP INTO BOTH EYES EVERY DAY 3 Bottle 3    levothyroxine (SYNTHROID) 75 MCG tablet Take 1 tablet (75 mcg total) by mouth before breakfast. 90 tablet 0    meclizine (ANTIVERT) 12.5 mg tablet Take 1 tablet (12.5 mg total) by mouth 3 (three) times daily as needed. 30 tablet  1    mometasone (ELOCON) 0.1 % ointment Apply topically once daily. 45 g 3    multivitamin capsule Take 1 capsule by mouth once daily.      nitroGLYCERIN (NITROSTAT) 0.4 MG SL tablet PLACE 1 TABLET UNDER THE TONGUE EVERY 5 MINUTES FOR 3 DOSES --IF NOT RELIEVED GO TO THE ER 25 tablet 6    nystatin (MYCOSTATIN) ointment Apply topically 2 (two) times daily. 30 g 2    nystatin-triamcinolone (MYCOLOG II) cream Apply topically 4 (four) times daily. 60 g 0    omeprazole (PRILOSEC) 40 MG capsule Take 1 capsule (40 mg total) by mouth once daily. 90 capsule 3    rosuvastatin (CRESTOR) 40 MG Tab Take 1 tablet (40 mg total) by mouth every evening. 90 tablet 3    TENS units Sravanthi 1 application by Misc.(Non-Drug; Combo Route) route daily as needed (pain). 1 Device 0    triamcinolone acetonide 0.1% (KENALOG) 0.1 % paste PLACE ONTO TEETH TWICE DAILY (Patient taking differently: PLACE ONTO TEETH PRN) 5 g 0    VITAMIN D2 50,000 unit capsule TAKE 1 CAPSULE (50,000 UNITS TOTAL) EVERY 30 DAYS 3 capsule 0     No current facility-administered medications for this visit.        Past Medical History:   Diagnosis Date    Allergy     Anatomical narrow angle glaucoma     Anemia     States diagnosed about a month ago    Aortic atherosclerosis     Arthritis     Cataract     CHF (congestive heart failure)     Chronic kidney disease     Chronic sciatica of left side     Right lower back pain due to sciatica    Coronary artery disease     Depression     Dry eyes     GERD (gastroesophageal reflux disease)     History of colon cancer     Hyperlipidemia     Hypertension     Hypothyroidism     Left ventricular diastolic dysfunction with preserved systolic function     Lupus (systemic lupus erythematosus) 8/17/2012    Malnutrition 5/16/2017    Osteoarthritis of cervical spine 8/17/2012    Osteopenia     PAD (peripheral artery disease)      Past Surgical History:   Procedure Laterality Date    CARDIAC CATHETERIZATION   07/27/2011    x1    CHOLECYSTECTOMY  1999    COLON SURGERY  2000 & 2003    partial removal    COLONOSCOPY N/A 4/14/2016    Procedure: COLONOSCOPY;  Surgeon: Jose Steen MD;  [facility]  Service: Endoscopy;  Laterality: N/A;  prep 2 days prior light meals only/clear liquid day before  and 4 dulcolax tabs (5 mgs) at noon    COLONOSCOPY N/A 9/14/2017    Procedure: COLONOSCOPY;  Surgeon: Jose Steen MD;  [facility]  Service: Endoscopy;  Laterality: N/A;    EYE SURGERY      HAND SURGERY Bilateral 1996 & 1997    due to carpal tunnel     HERNIA REPAIR      HYSTERECTOMY  1983    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    OOPHORECTOMY      removed one    Peripheral Iridotomy both eyes  1994    laser angle correction    THYROIDECTOMY, PARTIAL  2006    to remove two nodules and one-half thyroid     Family History   Problem Relation Age of Onset    Heart attack Mother     Hypertension Mother     Heart disease Father     Hypertension Father     Diabetes Maternal Grandmother     Hypertension Sister     Kidney disease Brother     Kidney failure Brother      received donated kidney from sister    No Known Problems Daughter     Diabetes Son     Hypertension Brother     Diabetes Maternal Aunt     No Known Problems Maternal Grandfather     No Known Problems Paternal Grandmother     No Known Problems Paternal Grandfather     Acne Neg Hx     Eczema Neg Hx     Lupus Neg Hx     Psoriasis Neg Hx     Melanoma Neg Hx      Social History   Substance Use Topics    Smoking status: Former Smoker     Packs/day: 1.50     Years: 30.00     Types: Cigarettes     Start date: 1955     Quit date: 3/13/1985    Smokeless tobacco: Never Used    Alcohol use No        Review of Systems:  Review of Systems   Constitutional: Positive for activity change.   Respiratory: Positive for shortness of breath. Negative for cough.    Cardiovascular: Negative for chest pain, palpitations  "and leg swelling.   Gastrointestinal: Negative for abdominal pain, nausea and vomiting.   Endocrine: Negative for polydipsia, polyphagia and polyuria.   Genitourinary: Negative for dysuria.   Musculoskeletal: Negative for gait problem.   Skin: Negative for rash.   Allergic/Immunologic: Positive for immunocompromised state.   Neurological: Negative for dizziness, syncope and weakness.   Hematological: Does not bruise/bleed easily.   Psychiatric/Behavioral: Negative for behavioral problems.       OBJECTIVE:     Vital Signs (Most Recent)  Temp: 97.8 °F (36.6 °C) (11/14/17 1010)  Pulse: 63 (11/14/17 1010)  BP: (!) 155/76 (11/14/17 1010)  SpO2: 98 % (11/14/17 1010)  5' 5" (1.651 m)  62.4 kg (137 lb 8 oz)     Physical Exam:  Physical Exam   Constitutional: She is oriented to person, place, and time. She appears well-developed and well-nourished.   Thin     HENT:   Head: Normocephalic.   Eyes: Pupils are equal, round, and reactive to light.   Cardiovascular: Normal rate and regular rhythm.    Murmur heard.  Pulmonary/Chest: Effort normal and breath sounds normal.   Musculoskeletal: Normal range of motion.   Neurological: She is alert and oriented to person, place, and time.   Skin: Skin is warm and dry.   Psychiatric: She has a normal mood and affect.     Diagnostic Results:  ECHO CONCLUSIONS     1 - Normal left ventricular systolic function (EF 60-65%). Concentric remodeling.     2 - Impaired LV relaxation, elevated LAP (grade 2 diastolic dysfunction).     3 - Normal right ventricular systolic function .     4 - Pulmonary hypertension. The estimated PA systolic pressure is 58 mmHg.     5 - Moderate to severe aortic stenosis, PABLITO = 0.95 cm2, peak velocity = 3.76 m/s, mean gradient = 28 mmHg.     6 - Trivial to mild aortic regurgitation.     7 - Trivial to mild mitral regurgitation.     8 - Trivial pericardial effusion.     9 - Increased central venous pressure.     10 - Severe left atrial enlargement.     11 - No wall " motion abnormalities.     ASSESSMENT/PLAN:   Patient is a 71 y.o. female presents with hx of HTN, CAD s/p PCI to LAD 2011, HLD, history partial colectomy for polyps and colon CA(cancer free now), single kidney s/p kidney donation 1985 and SLE.     STS Score 4.3.%  Any Morbidity 28.4%  NYHA Class III    PLAN:  Due to patient's comorbidities and frailty would like patient to be seen by the TAVR team.       CTS Attending Note:    I have personally taken the history and examined this patient and agree with the NP's note as stated above. 72 yo F with sig C/O of PEDRO and LE edema. She is NYHA III at least, and is very limited. She has CKD (with single kidney), anemia, lupus, and a history of smoking. Her ECHO demonstrates mod/severe AS at most; gradient is < 30 and PABLITO > 0.8. She has PH as well. Given her comorbid conditions and her functional status, she is at least at moderate risk for surgery. Most recent ECHO does not qualify for CHARLES. Will ask input of valve team- would BAV make sense from a risk/benefit standpoint to see if she improves?

## 2017-11-14 NOTE — TELEPHONE ENCOUNTER
----- Message from Elham Wyman MD sent at 11/14/2017  2:50 PM CST -----  Please let patient know that the kidney test is looking better and that she should continue to use the lasix.

## 2017-11-15 ENCOUNTER — PATIENT MESSAGE (OUTPATIENT)
Dept: NEPHROLOGY | Facility: CLINIC | Age: 72
End: 2017-11-15

## 2017-11-15 ENCOUNTER — PATIENT MESSAGE (OUTPATIENT)
Dept: FAMILY MEDICINE | Facility: CLINIC | Age: 72
End: 2017-11-15

## 2017-11-15 ENCOUNTER — PATIENT MESSAGE (OUTPATIENT)
Dept: CARDIOLOGY | Facility: CLINIC | Age: 72
End: 2017-11-15

## 2017-11-16 ENCOUNTER — TELEPHONE (OUTPATIENT)
Dept: NEPHROLOGY | Facility: HOSPITAL | Age: 72
End: 2017-11-16

## 2017-11-17 ENCOUNTER — HOSPITAL ENCOUNTER (INPATIENT)
Facility: HOSPITAL | Age: 72
LOS: 2 days | Discharge: HOME | DRG: 291 | End: 2017-11-19
Attending: EMERGENCY MEDICINE | Admitting: HOSPITALIST
Payer: MEDICARE

## 2017-11-17 DIAGNOSIS — R07.9 CHEST PAIN: ICD-10-CM

## 2017-11-17 DIAGNOSIS — I50.33 ACUTE ON CHRONIC DIASTOLIC CONGESTIVE HEART FAILURE: Primary | ICD-10-CM

## 2017-11-17 DIAGNOSIS — I35.0 AORTIC STENOSIS: ICD-10-CM

## 2017-11-17 LAB
ALBUMIN SERPL BCP-MCNC: 3 G/DL
ALP SERPL-CCNC: 78 U/L
ALT SERPL W/O P-5'-P-CCNC: 20 U/L
ANION GAP SERPL CALC-SCNC: 6 MMOL/L
AST SERPL-CCNC: 42 U/L
BASOPHILS # BLD AUTO: 0.04 K/UL
BASOPHILS NFR BLD: 1.1 %
BILIRUB SERPL-MCNC: 0.2 MG/DL
BNP SERPL-MCNC: 1073 PG/ML
BUN SERPL-MCNC: 40 MG/DL
CALCIUM SERPL-MCNC: 8.7 MG/DL
CHLORIDE SERPL-SCNC: 112 MMOL/L
CO2 SERPL-SCNC: 19 MMOL/L
CREAT SERPL-MCNC: 1.9 MG/DL
DIFFERENTIAL METHOD: ABNORMAL
EOSINOPHIL # BLD AUTO: 0.1 K/UL
EOSINOPHIL NFR BLD: 3 %
ERYTHROCYTE [DISTWIDTH] IN BLOOD BY AUTOMATED COUNT: 14.6 %
EST. GFR  (AFRICAN AMERICAN): 30.1 ML/MIN/1.73 M^2
EST. GFR  (NON AFRICAN AMERICAN): 26.1 ML/MIN/1.73 M^2
GLUCOSE SERPL-MCNC: 98 MG/DL
HCT VFR BLD AUTO: 30.9 %
HGB BLD-MCNC: 9.7 G/DL
IMM GRANULOCYTES # BLD AUTO: 0.01 K/UL
IMM GRANULOCYTES NFR BLD AUTO: 0.3 %
LYMPHOCYTES # BLD AUTO: 1 K/UL
LYMPHOCYTES NFR BLD: 26.9 %
MCH RBC QN AUTO: 25 PG
MCHC RBC AUTO-ENTMCNC: 31.4 G/DL
MCV RBC AUTO: 80 FL
MONOCYTES # BLD AUTO: 0.6 K/UL
MONOCYTES NFR BLD: 17.5 %
NEUTROPHILS # BLD AUTO: 1.9 K/UL
NEUTROPHILS NFR BLD: 51.2 %
NRBC BLD-RTO: 0 /100 WBC
PLATELET # BLD AUTO: 217 K/UL
PMV BLD AUTO: 10.4 FL
POTASSIUM SERPL-SCNC: 4.3 MMOL/L
PROT SERPL-MCNC: 7.3 G/DL
RBC # BLD AUTO: 3.88 M/UL
SODIUM SERPL-SCNC: 137 MMOL/L
TROPONIN I SERPL DL<=0.01 NG/ML-MCNC: 0.07 NG/ML
WBC # BLD AUTO: 3.61 K/UL

## 2017-11-17 PROCEDURE — 84484 ASSAY OF TROPONIN QUANT: CPT

## 2017-11-17 PROCEDURE — 12000002 HC ACUTE/MED SURGE SEMI-PRIVATE ROOM

## 2017-11-17 PROCEDURE — 63600175 PHARM REV CODE 636 W HCPCS: Performed by: STUDENT IN AN ORGANIZED HEALTH CARE EDUCATION/TRAINING PROGRAM

## 2017-11-17 PROCEDURE — 83880 ASSAY OF NATRIURETIC PEPTIDE: CPT

## 2017-11-17 PROCEDURE — 99285 EMERGENCY DEPT VISIT HI MDM: CPT | Mod: 25

## 2017-11-17 PROCEDURE — 99284 EMERGENCY DEPT VISIT MOD MDM: CPT | Mod: ,,, | Performed by: EMERGENCY MEDICINE

## 2017-11-17 PROCEDURE — 93005 ELECTROCARDIOGRAM TRACING: CPT

## 2017-11-17 PROCEDURE — 20600001 HC STEP DOWN PRIVATE ROOM

## 2017-11-17 PROCEDURE — 96374 THER/PROPH/DIAG INJ IV PUSH: CPT

## 2017-11-17 PROCEDURE — 93010 ELECTROCARDIOGRAM REPORT: CPT | Mod: ,,, | Performed by: INTERNAL MEDICINE

## 2017-11-17 PROCEDURE — 80053 COMPREHEN METABOLIC PANEL: CPT

## 2017-11-17 PROCEDURE — 85025 COMPLETE CBC W/AUTO DIFF WBC: CPT

## 2017-11-17 RX ORDER — HEPARIN SODIUM 5000 [USP'U]/ML
5000 INJECTION, SOLUTION INTRAVENOUS; SUBCUTANEOUS EVERY 12 HOURS
Status: DISCONTINUED | OUTPATIENT
Start: 2017-11-18 | End: 2017-11-19 | Stop reason: HOSPADM

## 2017-11-17 RX ORDER — SODIUM CHLORIDE 0.9 % (FLUSH) 0.9 %
3 SYRINGE (ML) INJECTION
Status: DISCONTINUED | OUTPATIENT
Start: 2017-11-18 | End: 2017-11-19 | Stop reason: HOSPADM

## 2017-11-17 RX ORDER — ONDANSETRON 8 MG/1
8 TABLET, ORALLY DISINTEGRATING ORAL EVERY 8 HOURS PRN
Status: DISCONTINUED | OUTPATIENT
Start: 2017-11-18 | End: 2017-11-19 | Stop reason: HOSPADM

## 2017-11-17 RX ORDER — FUROSEMIDE 10 MG/ML
40 INJECTION INTRAMUSCULAR; INTRAVENOUS
Status: COMPLETED | OUTPATIENT
Start: 2017-11-17 | End: 2017-11-17

## 2017-11-17 RX ORDER — ACETAMINOPHEN 325 MG/1
650 TABLET ORAL EVERY 4 HOURS PRN
Status: DISCONTINUED | OUTPATIENT
Start: 2017-11-18 | End: 2017-11-19 | Stop reason: HOSPADM

## 2017-11-17 RX ADMIN — FUROSEMIDE 40 MG: 10 INJECTION, SOLUTION INTRAMUSCULAR; INTRAVENOUS at 09:11

## 2017-11-18 PROBLEM — N18.9 ACUTE ON CHRONIC KIDNEY FAILURE: Status: ACTIVE | Noted: 2017-11-18

## 2017-11-18 PROBLEM — R79.89 ELEVATED TROPONIN: Status: ACTIVE | Noted: 2017-11-18

## 2017-11-18 PROBLEM — I35.0 AORTIC STENOSIS, MODERATE: Status: ACTIVE | Noted: 2017-11-18

## 2017-11-18 PROBLEM — N17.9 ACUTE ON CHRONIC KIDNEY FAILURE: Status: ACTIVE | Noted: 2017-11-18

## 2017-11-18 LAB
ALBUMIN SERPL BCP-MCNC: 2.7 G/DL
ALP SERPL-CCNC: 71 U/L
ALT SERPL W/O P-5'-P-CCNC: 16 U/L
ANION GAP SERPL CALC-SCNC: 10 MMOL/L
ANION GAP SERPL CALC-SCNC: 9 MMOL/L
ANION GAP SERPL CALC-SCNC: 9 MMOL/L
AST SERPL-CCNC: 35 U/L
BASOPHILS # BLD AUTO: 0.03 K/UL
BASOPHILS NFR BLD: 0.7 %
BILIRUB SERPL-MCNC: 0.2 MG/DL
BUN SERPL-MCNC: 39 MG/DL
BUN SERPL-MCNC: 39 MG/DL
BUN SERPL-MCNC: 40 MG/DL
CALCIUM SERPL-MCNC: 8.4 MG/DL
CALCIUM SERPL-MCNC: 8.4 MG/DL
CALCIUM SERPL-MCNC: 8.9 MG/DL
CHLORIDE SERPL-SCNC: 111 MMOL/L
CHLORIDE SERPL-SCNC: 111 MMOL/L
CHLORIDE SERPL-SCNC: 112 MMOL/L
CO2 SERPL-SCNC: 16 MMOL/L
CO2 SERPL-SCNC: 17 MMOL/L
CO2 SERPL-SCNC: 19 MMOL/L
CREAT SERPL-MCNC: 1.8 MG/DL
CREAT SERPL-MCNC: 1.9 MG/DL
CREAT SERPL-MCNC: 1.9 MG/DL
DIFFERENTIAL METHOD: ABNORMAL
EOSINOPHIL # BLD AUTO: 0.1 K/UL
EOSINOPHIL NFR BLD: 3 %
ERYTHROCYTE [DISTWIDTH] IN BLOOD BY AUTOMATED COUNT: 14.6 %
EST. GFR  (AFRICAN AMERICAN): 30.1 ML/MIN/1.73 M^2
EST. GFR  (AFRICAN AMERICAN): 30.1 ML/MIN/1.73 M^2
EST. GFR  (AFRICAN AMERICAN): 32.2 ML/MIN/1.73 M^2
EST. GFR  (NON AFRICAN AMERICAN): 26.1 ML/MIN/1.73 M^2
EST. GFR  (NON AFRICAN AMERICAN): 26.1 ML/MIN/1.73 M^2
EST. GFR  (NON AFRICAN AMERICAN): 27.9 ML/MIN/1.73 M^2
GLUCOSE SERPL-MCNC: 74 MG/DL
GLUCOSE SERPL-MCNC: 76 MG/DL
GLUCOSE SERPL-MCNC: 86 MG/DL
HCT VFR BLD AUTO: 26.7 %
HGB BLD-MCNC: 8.6 G/DL
IMM GRANULOCYTES # BLD AUTO: 0.01 K/UL
IMM GRANULOCYTES NFR BLD AUTO: 0.2 %
LYMPHOCYTES # BLD AUTO: 1.1 K/UL
LYMPHOCYTES NFR BLD: 27.5 %
MAGNESIUM SERPL-MCNC: 1.6 MG/DL
MCH RBC QN AUTO: 25.6 PG
MCHC RBC AUTO-ENTMCNC: 32.2 G/DL
MCV RBC AUTO: 80 FL
MONOCYTES # BLD AUTO: 0.7 K/UL
MONOCYTES NFR BLD: 17.1 %
NEUTROPHILS # BLD AUTO: 2.1 K/UL
NEUTROPHILS NFR BLD: 51.5 %
NRBC BLD-RTO: 0 /100 WBC
PHOSPHATE SERPL-MCNC: 4.4 MG/DL
PLATELET # BLD AUTO: 191 K/UL
PMV BLD AUTO: 11 FL
POTASSIUM SERPL-SCNC: 3.8 MMOL/L
POTASSIUM SERPL-SCNC: 4.1 MMOL/L
POTASSIUM SERPL-SCNC: 4.7 MMOL/L
PROT SERPL-MCNC: 6.5 G/DL
RBC # BLD AUTO: 3.36 M/UL
SODIUM SERPL-SCNC: 137 MMOL/L
SODIUM SERPL-SCNC: 138 MMOL/L
SODIUM SERPL-SCNC: 139 MMOL/L
TROPONIN I SERPL DL<=0.01 NG/ML-MCNC: 0.07 NG/ML
WBC # BLD AUTO: 4.04 K/UL

## 2017-11-18 PROCEDURE — 36415 COLL VENOUS BLD VENIPUNCTURE: CPT

## 2017-11-18 PROCEDURE — 63600175 PHARM REV CODE 636 W HCPCS: Performed by: INTERNAL MEDICINE

## 2017-11-18 PROCEDURE — 99223 1ST HOSP IP/OBS HIGH 75: CPT | Mod: AI,GC,, | Performed by: HOSPITALIST

## 2017-11-18 PROCEDURE — 83735 ASSAY OF MAGNESIUM: CPT

## 2017-11-18 PROCEDURE — 25000003 PHARM REV CODE 250: Performed by: INTERNAL MEDICINE

## 2017-11-18 PROCEDURE — 80048 BASIC METABOLIC PNL TOTAL CA: CPT | Mod: 91

## 2017-11-18 PROCEDURE — 20600001 HC STEP DOWN PRIVATE ROOM

## 2017-11-18 PROCEDURE — 80048 BASIC METABOLIC PNL TOTAL CA: CPT

## 2017-11-18 PROCEDURE — 85025 COMPLETE CBC W/AUTO DIFF WBC: CPT

## 2017-11-18 PROCEDURE — 84484 ASSAY OF TROPONIN QUANT: CPT

## 2017-11-18 PROCEDURE — 84100 ASSAY OF PHOSPHORUS: CPT

## 2017-11-18 PROCEDURE — 25000003 PHARM REV CODE 250: Performed by: STUDENT IN AN ORGANIZED HEALTH CARE EDUCATION/TRAINING PROGRAM

## 2017-11-18 PROCEDURE — 80053 COMPREHEN METABOLIC PANEL: CPT

## 2017-11-18 PROCEDURE — 99223 1ST HOSP IP/OBS HIGH 75: CPT | Mod: GC,,, | Performed by: INTERNAL MEDICINE

## 2017-11-18 RX ORDER — HYDRALAZINE HYDROCHLORIDE 25 MG/1
25 TABLET, FILM COATED ORAL 3 TIMES DAILY
Status: DISCONTINUED | OUTPATIENT
Start: 2017-11-18 | End: 2017-11-19 | Stop reason: HOSPADM

## 2017-11-18 RX ORDER — CARVEDILOL 25 MG/1
25 TABLET ORAL 2 TIMES DAILY WITH MEALS
Status: DISCONTINUED | OUTPATIENT
Start: 2017-11-18 | End: 2017-11-19 | Stop reason: HOSPADM

## 2017-11-18 RX ORDER — HYDRALAZINE HYDROCHLORIDE 10 MG/1
20 TABLET, FILM COATED ORAL EVERY 12 HOURS
Status: DISCONTINUED | OUTPATIENT
Start: 2017-11-18 | End: 2017-11-18

## 2017-11-18 RX ORDER — HYDROXYCHLOROQUINE SULFATE 200 MG/1
200 TABLET, FILM COATED ORAL 2 TIMES DAILY
Status: DISCONTINUED | OUTPATIENT
Start: 2017-11-18 | End: 2017-11-19 | Stop reason: HOSPADM

## 2017-11-18 RX ORDER — CARVEDILOL 25 MG/1
25 TABLET ORAL 2 TIMES DAILY
Status: DISCONTINUED | OUTPATIENT
Start: 2017-11-18 | End: 2017-11-18

## 2017-11-18 RX ORDER — FUROSEMIDE 10 MG/ML
80 INJECTION INTRAMUSCULAR; INTRAVENOUS 2 TIMES DAILY
Status: DISCONTINUED | OUTPATIENT
Start: 2017-11-18 | End: 2017-11-19

## 2017-11-18 RX ORDER — AMOXICILLIN 250 MG
1 CAPSULE ORAL DAILY PRN
Status: DISCONTINUED | OUTPATIENT
Start: 2017-11-18 | End: 2017-11-19 | Stop reason: HOSPADM

## 2017-11-18 RX ORDER — CITALOPRAM 20 MG/1
20 TABLET, FILM COATED ORAL DAILY
Status: DISCONTINUED | OUTPATIENT
Start: 2017-11-18 | End: 2017-11-19 | Stop reason: HOSPADM

## 2017-11-18 RX ORDER — ASPIRIN 81 MG/1
81 TABLET ORAL DAILY
Status: DISCONTINUED | OUTPATIENT
Start: 2017-11-18 | End: 2017-11-19 | Stop reason: HOSPADM

## 2017-11-18 RX ORDER — SODIUM BICARBONATE 650 MG/1
650 TABLET ORAL ONCE
Status: DISCONTINUED | OUTPATIENT
Start: 2017-11-18 | End: 2017-11-18

## 2017-11-18 RX ORDER — SODIUM BICARBONATE 650 MG/1
650 TABLET ORAL DAILY
Status: DISCONTINUED | OUTPATIENT
Start: 2017-11-18 | End: 2017-11-19 | Stop reason: HOSPADM

## 2017-11-18 RX ORDER — HEPARIN SODIUM 5000 [USP'U]/ML
5000 INJECTION, SOLUTION INTRAVENOUS; SUBCUTANEOUS EVERY 8 HOURS
Status: DISCONTINUED | OUTPATIENT
Start: 2017-11-18 | End: 2017-11-18

## 2017-11-18 RX ORDER — ROSUVASTATIN CALCIUM 20 MG/1
40 TABLET, COATED ORAL NIGHTLY
Status: DISCONTINUED | OUTPATIENT
Start: 2017-11-18 | End: 2017-11-19 | Stop reason: HOSPADM

## 2017-11-18 RX ORDER — AMLODIPINE BESYLATE 10 MG/1
10 TABLET ORAL DAILY
Status: DISCONTINUED | OUTPATIENT
Start: 2017-11-18 | End: 2017-11-18

## 2017-11-18 RX ORDER — LEVOTHYROXINE SODIUM 75 UG/1
75 TABLET ORAL
Status: DISCONTINUED | OUTPATIENT
Start: 2017-11-18 | End: 2017-11-19 | Stop reason: HOSPADM

## 2017-11-18 RX ORDER — AMLODIPINE BESYLATE 10 MG/1
10 TABLET ORAL DAILY
Status: DISCONTINUED | OUTPATIENT
Start: 2017-11-18 | End: 2017-11-19 | Stop reason: HOSPADM

## 2017-11-18 RX ADMIN — AMLODIPINE BESYLATE 10 MG: 10 TABLET ORAL at 12:11

## 2017-11-18 RX ADMIN — HYDRALAZINE HYDROCHLORIDE 25 MG: 25 TABLET, FILM COATED ORAL at 09:11

## 2017-11-18 RX ADMIN — CITALOPRAM HYDROBROMIDE 20 MG: 20 TABLET ORAL at 09:11

## 2017-11-18 RX ADMIN — FUROSEMIDE 80 MG: 10 INJECTION, SOLUTION INTRAVENOUS at 05:11

## 2017-11-18 RX ADMIN — HEPARIN SODIUM 5000 UNITS: 5000 INJECTION, SOLUTION INTRAVENOUS; SUBCUTANEOUS at 09:11

## 2017-11-18 RX ADMIN — HYDRALAZINE HYDROCHLORIDE 25 MG: 25 TABLET, FILM COATED ORAL at 01:11

## 2017-11-18 RX ADMIN — ASPIRIN 81 MG: 81 TABLET, COATED ORAL at 09:11

## 2017-11-18 RX ADMIN — CARVEDILOL 25 MG: 25 TABLET, FILM COATED ORAL at 05:11

## 2017-11-18 RX ADMIN — HYDROXYCHLOROQUINE SULFATE 200 MG: 200 TABLET, FILM COATED ORAL at 09:11

## 2017-11-18 RX ADMIN — ROSUVASTATIN CALCIUM 40 MG: 20 TABLET, FILM COATED ORAL at 01:11

## 2017-11-18 RX ADMIN — ROSUVASTATIN CALCIUM 40 MG: 20 TABLET, FILM COATED ORAL at 09:11

## 2017-11-18 RX ADMIN — HYDROXYCHLOROQUINE SULFATE 200 MG: 200 TABLET, FILM COATED ORAL at 01:11

## 2017-11-18 RX ADMIN — SODIUM BICARBONATE 650 MG TABLET 650 MG: at 09:11

## 2017-11-18 RX ADMIN — LEVOTHYROXINE SODIUM 75 MCG: 75 TABLET ORAL at 05:11

## 2017-11-18 RX ADMIN — ACETAMINOPHEN 650 MG: 325 TABLET ORAL at 12:11

## 2017-11-18 RX ADMIN — FUROSEMIDE 80 MG: 10 INJECTION, SOLUTION INTRAVENOUS at 04:11

## 2017-11-18 RX ADMIN — HEPARIN SODIUM 5000 UNITS: 5000 INJECTION, SOLUTION INTRAVENOUS; SUBCUTANEOUS at 12:11

## 2017-11-18 NOTE — ASSESSMENT & PLAN NOTE
-likely due to demand ischemia in setting of ADHF  -no chest pain   -EKG without ischemic changes  -trop 0.074; will trend

## 2017-11-18 NOTE — PLAN OF CARE
Problem: Patient Care Overview  Goal: Plan of Care Review  Outcome: Revised  Plan of care discussed with patient. Patient is free of fall/trauma/injury. Denies CP, SOB, or pain/discomfort. Patient on Lasix IVP and diuresing well.  All questions addressed. Will continue to monitor

## 2017-11-18 NOTE — ED PROVIDER NOTES
Encounter Date: 11/17/2017    SCRIBE #1 NOTE: I, Diann Carmichael, am scribing for, and in the presence of,  Dr. Ortega. I have scribed the following portions of the note - the Resident attestation.       History     Chief Complaint   Patient presents with    Leg Swelling     Pt c/o bilateral lower extremity edema x 2 months.  Pt states she has seen her MD for same-given Lasix without improvement.     71-year-old female with HFpEF (60% 10/30/17), moderate aortic stenosis presenting with worsening extremity edema bilaterally and worsening dyspnea on exertion and orthopnea over the last 2 months.  The patient's cardiologist has been titrating up her Lasix dose and she went from taking 20 mg twice a day by mouth to 40 mg twice a day starting today.  However, she feels like she has some much fluid on her that she needs to be admitted for IV diuresis.          Review of patient's allergies indicates:   Allergen Reactions    Ace inhibitors      Other reaction(s): Lips Swelling    Vioxx [rofecoxib]      Other reaction(s): lips swelling    Bactrim [sulfamethoxazole-trimethoprim]      Pt would like this medication to be added due to elevation of creatinine with single kidney.    Arthrotec 50 [diclofenac-misoprostol]      Other reaction(s): Unknown    Bentyl [dicyclomine]      Not effective    Doxycycline      nausea    Imdur [isosorbide mononitrate] Nausea Only    Lomotil [diphenoxylate-atropine]      Stomach cramps, pt vomitted    Lozol [indapamide] Rash    Tramadol Nausea Only     Past Medical History:   Diagnosis Date    Acute on chronic diastolic congestive heart failure 11/17/2017    Allergy     Anatomical narrow angle glaucoma     Anemia     States diagnosed about a month ago    Aortic atherosclerosis     Arthritis     Cataract     CHF (congestive heart failure)     Chronic kidney disease     Chronic sciatica of left side     Right lower back pain due to sciatica    Coronary artery disease      Depression     Dry eyes     GERD (gastroesophageal reflux disease)     History of colon cancer     Hyperlipidemia     Hypertension     Hypothyroidism     Left ventricular diastolic dysfunction with preserved systolic function     Lupus (systemic lupus erythematosus) 8/17/2012    Malnutrition 5/16/2017    Osteoarthritis of cervical spine 8/17/2012    Osteopenia     PAD (peripheral artery disease)      Past Surgical History:   Procedure Laterality Date    CARDIAC CATHETERIZATION  07/27/2011    x1    CHOLECYSTECTOMY  1999    COLON SURGERY  2000 & 2003    partial removal    COLONOSCOPY N/A 4/14/2016    Procedure: COLONOSCOPY;  Surgeon: Jose Steen MD;  Location: [facility] (4TH F[facility]);  Service: Endoscopy;  Laterality: N/A;  prep 2 days prior light meals only/clear liquid day before  and 4 dulcolax tabs (5 mgs) at noon    COLONOSCOPY N/A 9/14/2017    Procedure: COLONOSCOPY;  Surgeon: Jose Steen MD;  Location: [facility] (4TH FLR);  Service: Endoscopy;  Laterality: N/A;    EYE SURGERY      HAND SURGERY Bilateral 1996 & 1997    due to carpal tunnel     HERNIA REPAIR      HYSTERECTOMY  1983    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    OOPHORECTOMY      removed one    Peripheral Iridotomy both eyes  1994    laser angle correction    THYROIDECTOMY, PARTIAL  2006    to remove two nodules and one-half thyroid     Family History   Problem Relation Age of Onset    Heart attack Mother     Hypertension Mother     Heart disease Father     Hypertension Father     Diabetes Maternal Grandmother     Hypertension Sister     Kidney disease Brother     Kidney failure Brother      received donated kidney from sister    No Known Problems Daughter     Diabetes Son     Hypertension Brother     Diabetes Maternal Aunt     No Known Problems Maternal Grandfather     No Known Problems Paternal Grandmother     No Known Problems Paternal Grandfather     Acne Neg Hx     Eczema Neg Hx     Lupus Neg Hx      Psoriasis Neg Hx     Melanoma Neg Hx      Social History   Substance Use Topics    Smoking status: Former Smoker     Packs/day: 1.50     Years: 30.00     Types: Cigarettes     Start date: 1955     Quit date: 3/13/1985    Smokeless tobacco: Never Used    Alcohol use No     Review of Systems   Constitutional: Negative for chills and fever.   HENT: Negative for ear pain and sore throat.    Respiratory: Positive for shortness of breath. Negative for cough.    Cardiovascular: Positive for leg swelling. Negative for chest pain.   Gastrointestinal: Negative for abdominal pain and nausea.   Genitourinary: Negative for dysuria and flank pain.   Musculoskeletal: Negative for back pain and neck pain.   Skin: Negative for pallor and rash.   Neurological: Negative for weakness and light-headedness.   Hematological: Does not bruise/bleed easily.   Psychiatric/Behavioral: Negative for agitation and confusion.       Physical Exam     Initial Vitals [11/17/17 1727]   BP Pulse Resp Temp SpO2   (!) 148/97 66 18 98.4 °F (36.9 °C) 97 %      MAP       114         Physical Exam    Nursing note and vitals reviewed.  Constitutional: She appears well-developed and well-nourished.   HENT:   Head: Normocephalic and atraumatic.   Eyes: EOM are normal. Pupils are equal, round, and reactive to light.   Neck: Normal range of motion. Neck supple.   Cardiovascular: Normal rate, regular rhythm and intact distal pulses.   Murmur (3/6 systolic murmur best heard at the aortic area) heard.  2+ pitting edema to BLE up to hips   Pulmonary/Chest: She has no wheezes.   Bibasilar crackles, patient satting 98% on RA   Abdominal: Soft. Bowel sounds are normal. She exhibits no distension. There is no tenderness. There is no rebound and no guarding.   Neurological: She is alert and oriented to person, place, and time.   Skin: Skin is warm and dry.         ED Course   Procedures  Labs Reviewed   CBC W/ AUTO DIFFERENTIAL - Abnormal; Notable for the  following:        Result Value    WBC 3.61 (*)     RBC 3.88 (*)     Hemoglobin 9.7 (*)     Hematocrit 30.9 (*)     MCV 80 (*)     MCH 25.0 (*)     MCHC 31.4 (*)     RDW 14.6 (*)     Mono% 17.5 (*)     All other components within normal limits   COMPREHENSIVE METABOLIC PANEL - Abnormal; Notable for the following:     Chloride 112 (*)     CO2 19 (*)     BUN, Bld 40 (*)     Creatinine 1.9 (*)     Albumin 3.0 (*)     AST 42 (*)     Anion Gap 6 (*)     eGFR if  30.1 (*)     eGFR if non  26.1 (*)     All other components within normal limits   TROPONIN I - Abnormal; Notable for the following:     Troponin I 0.074 (*)     All other components within normal limits   B-TYPE NATRIURETIC PEPTIDE - Abnormal; Notable for the following:     BNP 1,073 (*)     All other components within normal limits             Medical Decision Making:   History:   Old Medical Records: I decided to obtain old medical records.  Clinical Tests:   Lab Tests: Ordered and Reviewed  Radiological Study: Reviewed and Ordered  Medical Tests: Ordered and Reviewed       APC / Resident Notes:   HO3 MDM:  71-year-old female with heart failure preserved EF of 60% presenting with significant lower extremity swelling and pulmonary edema worsening over the last 2 months.  The patient is hemodynamically stable and in no acute respiratory distress, satting in the high 90s on room air.  The patient's BNP is greater than 1000, significantly elevated from prior level of 266.  Her troponin is also a mildly elevated to 0.074.  Have given the patient 40 mg IV Lasix now.  She has been admitted to hospital medicine for further IV diuresis.  Teetee Cedeno MD  [employer] EM/IM PGY-2  11/17/2017 11:05 PM         Scribe Attestation:   Scribe #1: I performed the above scribed service and the documentation accurately describes the services I performed. I attest to the accuracy of the note.    Attending Attestation:   Physician Attestation Statement for  Resident:  As the supervising MD   Physician Attestation Statement: I have personally seen and examined this patient.   I agree with the above history. -: 71 y.o. female with a history of CHF and PEDRO who presents with bilateral LE swelling. Will give Lasix, obtain CHF workup, and reassess.   As the supervising MD I agree with the above PE.    As the supervising MD I agree with the above treatment, course, plan, and disposition.                    ED Course      Clinical Impression:   The primary encounter diagnosis was Acute on chronic diastolic congestive heart failure. A diagnosis of Chest pain was also pertinent to this visit.                           Teetee Cedeno MD  Resident  11/18/17 [id number]

## 2017-11-18 NOTE — CONSULTS
[facility]  Interventional Cardiology  Consult Note    Patient Name: Ewelina Arnold  MRN: [id number]  Admission Date: 11/17/2017  Hospital Length of Stay: 1 days  Code Status: Full Code   Attending Provider: Kane Orellana MD  Consulting Provider: Cecil Harris MD  Primary Care Physician: Kalie Walton MD  Principal Problem:Aortic stenosis, moderate    Patient information was obtained from patient and past medical records.     Consults  Subjective:     Chief Complaint:  NYHA FC III symptoms     HPI:  71 y F with PMHx of HTN, CAD s/p PCI to LAD 2011, HLD, history partial colectomy for polyps and colon CA (cancer free now), single kidney s/p kidney donation 1985, SLE, HFpEF (60) with NYHA FC III symptoms,Pulmonary HTN (sPAP 58). moderate aortic stenosis presenting with 2-3 mths hx of progressively worsening dyspnea on exertion and bilateral lower extremity edema. Reports a 2 wks hx of orthopnea having to sleep propped up on 2 pillows, 2wk hx of wheezing, PEDRO with short distances, leg weakness, decreased appetite and bilat leg edema 3+. She denies angina or syncopal episodes.    She was recently evaluated by Dr. Rob on 11/4/17 and was deemed moderate risk for surgery considering her comorbidities and was referred to Dr. Ly for evaluation of BAV as part of Heart Team.    Pt reports medication and dietary compliance. Has noted urinating less after taking lasix. Dose increased to 40mg PO BID by nephrology yesterday without improvement on urine output. Does not weight herself regularly. No hx of COPD or asthma. Is a former 1.5PPD smoker, quit 15yrs ago    Ewelina Arnold is a 71 y.o. female referred by Dr Elham Wyman/Dr. Rob    for evaluation of severe AS (NYHA Class III sx).    The patient has undergone the following TAVR work-up:   ECHO (Date 10/30/17): PABLITO= 0.96cm2, MG= 28mmHg, Peak Abdulkadir= 3.7m/s, EF= 60%.   LHC : pending  STS: pending  Frailty: pending  Iliacs arepending  LVOT  area by CTA is pending  Incidental findings on CT:pending  CT Surgery risk assessment: moderate risk, per Dr Rob due to comorbidities  Rhythm issues: none  PFTs: FEV1 pending  Comorbidities: CKD/single kidney/PH                Objective:     Vital Signs (Most Recent):  Temp: 98.1 °F (36.7 °C) (11/18/17 0716)  Pulse: 67 (11/18/17 0716)  Resp: 18 (11/18/17 0716)  BP: (!) 166/74 (11/18/17 0716)  SpO2: 98 % (11/18/17 0716) Vital Signs (24h Range):  Temp:  [97.6 °F (36.4 °C)-98.4 °F (36.9 °C)] 98.1 °F (36.7 °C)  Pulse:  [62-72] 67  Resp:  [18] 18  SpO2:  [93 %-98 %] 98 %  BP: (128-167)/(64-97) 166/74     Weight: 60.9 kg (134 lb 4.2 oz)  Body mass index is 22.34 kg/m².    SpO2: 98 %  O2 Device (Oxygen Therapy): room air      Intake/Output Summary (Last 24 hours) at 11/18/17 1046  Last data filed at 11/18/17 0908   Gross per 24 hour   Intake              525 ml   Output             1025 ml   Net             -500 ml       Lines/Drains/Airways     Peripheral Intravenous Line                 Peripheral IV - Single Lumen 11/17/17 2113 Right Antecubital less than 1 day                Physical Exam   Constitutional: She appears well-developed and well-nourished.   HENT:   Head: Normocephalic and atraumatic.   Right Ear: External ear normal.   Left Ear: External ear normal.   Eyes: Conjunctivae and EOM are normal. Pupils are equal, round, and reactive to light.   Neck: Normal range of motion. Neck supple. No JVD present. No thyromegaly present.   Cardiovascular: Normal rate, regular rhythm, S1 normal, S2 normal and intact distal pulses.  Exam reveals no gallop, no S3 and no friction rub.    Murmur heard.  Pulses:       Radial pulses are 2+ on the right side, and 2+ on the left side.        Femoral pulses are 2+ on the right side, and 2+ on the left side.       Dorsalis pedis pulses are 1+ on the right side, and 1+ on the left side.        Posterior tibial pulses are 1+ on the right side, and 1+ on the left side.   Mid  systolic peaking crescendo murmur. Pitting edema 2+   Pulmonary/Chest: Effort normal. No stridor. No tachypnea. She has decreased breath sounds in the right lower field and the left lower field. She has wheezes. She has rales in the right lower field and the left lower field. She exhibits no tenderness.   Abdominal: Soft. Bowel sounds are normal. She exhibits no distension. There is no tenderness. There is no rebound and no guarding.   Musculoskeletal: Normal range of motion. She exhibits no edema or tenderness.   Psychiatric: She has a normal mood and affect.           Significant Imaging:    10/30/17:  CONCLUSIONS     1 - Normal left ventricular systolic function (EF 60-65%). Concentric remodeling.     2 - Impaired LV relaxation, elevated LAP (grade 2 diastolic dysfunction).     3 - Normal right ventricular systolic function .     4 - Pulmonary hypertension. The estimated PA systolic pressure is 58 mmHg.     5 - Moderate to severe aortic stenosis, PABLITO = 0.95 cm2, peak velocity = 3.76 m/s, mean gradient = 28 mmHg.     6 - Trivial to mild aortic regurgitation.     7 - Trivial to mild mitral regurgitation.     8 - Trivial pericardial effusion.     9 - Increased central venous pressure.     10 - Severe left atrial enlargement.     11 - No wall motion abnormalities.     Assessment and Plan:     Aortic Stenosis - Moderate    Patient with moderate aortic stenosis with HFpEF and Pulmonary HTN with NYHA FC III symptoms   Agree with IV diuresis. Will discuss with staff if BAV can be helpful in this scenerio. She denies angina and had recent echo with WMA making ischemic less likely etiology of her symptoms but cannot be completely excluded considering she had PCI of LAD in 2011 with no subsequent ischemic evaluation in this setting and now with change in clinical condition.   Recommend 2 Echo with doppler.      Acute on chronic kidney failure    Creatinine at baseline. She has single kidney s/p donation. Will have to use  low contrast technique during coronary evaluation if deemed necessary.          CAD (coronary artery disease)    Continue with secondary prevention ASCVD including ASA/high intensity statin therapy and anti-anginal therapy.            VTE Risk Mitigation         Ordered     Medium Risk of VTE  Once      11/18/17 0805     heparin (porcine) injection 5,000 Units  Every 12 hours     Route:  Subcutaneous        11/17/17 1551          Thank you for your consult. I will follow-up with patient. Please contact us if you have any additional questions.    Cecil Harris MD  Interventional Cardiology   [facility]

## 2017-11-18 NOTE — ASSESSMENT & PLAN NOTE
Continue with secondary prevention ASCVD including ASA/high intensity statin therapy and anti-anginal therapy.

## 2017-11-18 NOTE — PROGRESS NOTES
11/18/17 0716   Vital Signs   Temp 98.1 °F (36.7 °C)   Temp src Oral   Pulse 67   Heart Rate Source Monitor   Resp 18   SpO2 98 %   O2 Device (Oxygen Therapy) room air   BP (!) 166/74   MAP (mmHg) 107   BP Location Left arm   Patient Position Lying   Height and Weight   Weight 60.9 kg (134 lb 4.2 oz)   Weight Method Standard Scale     /74, and no BP meds ordered at this time.  Notified team.  No orders received at this time.  Will continue to monitor.

## 2017-11-18 NOTE — SUBJECTIVE & OBJECTIVE
Objective:     Vital Signs (Most Recent):  Temp: 98.1 °F (36.7 °C) (11/18/17 0716)  Pulse: 67 (11/18/17 0716)  Resp: 18 (11/18/17 0716)  BP: (!) 166/74 (11/18/17 0716)  SpO2: 98 % (11/18/17 0716) Vital Signs (24h Range):  Temp:  [97.6 °F (36.4 °C)-98.4 °F (36.9 °C)] 98.1 °F (36.7 °C)  Pulse:  [62-72] 67  Resp:  [18] 18  SpO2:  [93 %-98 %] 98 %  BP: (128-167)/(64-97) 166/74     Weight: 60.9 kg (134 lb 4.2 oz)  Body mass index is 22.34 kg/m².    SpO2: 98 %  O2 Device (Oxygen Therapy): room air      Intake/Output Summary (Last 24 hours) at 11/18/17 1046  Last data filed at 11/18/17 0908   Gross per 24 hour   Intake              525 ml   Output             1025 ml   Net             -500 ml       Lines/Drains/Airways     Peripheral Intravenous Line                 Peripheral IV - Single Lumen 11/17/17 2113 Right Antecubital less than 1 day                Physical Exam   Constitutional: She appears well-developed and well-nourished.   HENT:   Head: Normocephalic and atraumatic.   Right Ear: External ear normal.   Left Ear: External ear normal.   Eyes: Conjunctivae and EOM are normal. Pupils are equal, round, and reactive to light.   Neck: Normal range of motion. Neck supple. No JVD present. No thyromegaly present.   Cardiovascular: Normal rate, regular rhythm, S1 normal, S2 normal and intact distal pulses.  Exam reveals no gallop, no S3 and no friction rub.    Murmur heard.  Pulses:       Radial pulses are 2+ on the right side, and 2+ on the left side.        Femoral pulses are 2+ on the right side, and 2+ on the left side.       Dorsalis pedis pulses are 1+ on the right side, and 1+ on the left side.        Posterior tibial pulses are 1+ on the right side, and 1+ on the left side.   Mid systolic peaking crescendo murmur. Pitting edema 2+   Pulmonary/Chest: Effort normal. No stridor. No tachypnea. She has decreased breath sounds in the right lower field and the left lower field. She has wheezes. She has rales in the  right lower field and the left lower field. She exhibits no tenderness.   Abdominal: Soft. Bowel sounds are normal. She exhibits no distension. There is no tenderness. There is no rebound and no guarding.   Musculoskeletal: Normal range of motion. She exhibits no edema or tenderness.   Psychiatric: She has a normal mood and affect.           Significant Imaging:    10/30/17:  CONCLUSIONS     1 - Normal left ventricular systolic function (EF 60-65%). Concentric remodeling.     2 - Impaired LV relaxation, elevated LAP (grade 2 diastolic dysfunction).     3 - Normal right ventricular systolic function .     4 - Pulmonary hypertension. The estimated PA systolic pressure is 58 mmHg.     5 - Moderate to severe aortic stenosis, PABLITO = 0.95 cm2, peak velocity = 3.76 m/s, mean gradient = 28 mmHg.     6 - Trivial to mild aortic regurgitation.     7 - Trivial to mild mitral regurgitation.     8 - Trivial pericardial effusion.     9 - Increased central venous pressure.     10 - Severe left atrial enlargement.     11 - No wall motion abnormalities.

## 2017-11-18 NOTE — HPI
71 y F with PMHx of HTN, CAD s/p PCI to LAD 2011, HLD, history partial colectomy for polyps and colon CA (cancer free now), single kidney s/p kidney donation 1985, SLE, HFpEF (60) with NYHA FC III symptoms,Pulmonary HTN (sPAP 58). moderate aortic stenosis presenting with 2-3 mths hx of progressively worsening dyspnea on exertion and bilateral lower extremity edema. Reports a 2 wks hx of orthopnea having to sleep propped up on 2 pillows, 2wk hx of wheezing, PEDRO with short distances, leg weakness, decreased appetite and bilat leg edema 3+. She denies angina or syncopal episodes.    She was recently evaluated by Dr. Rob on 11/4/17 and was deemed moderate risk for surgery considering her comorbidities and was referred to Dr. Ly for evaluation of BAV as part of Heart Team.    Pt reports medication and dietary compliance. Has noted urinating less after taking lasix. Dose increased to 40mg PO BID by nephrology yesterday without improvement on urine output. Does not weight herself regularly. No hx of COPD or asthma. Is a former 1.5PPD smoker, quit 15yrs ago    Ewelina Arnold is a 71 y.o. female referred by Dr lEham Wyman/Dr. Rob    for evaluation of severe AS (NYHA Class III sx).    The patient has undergone the following TAVR work-up:   ECHO (Date 10/30/17): PABLITO= 0.96cm2, MG= 28mmHg, Peak Abdulkadir= 3.7m/s, EF= 60%.   LHC : pending  STS: pending  Frailty: pending  Iliacs arepending  LVOT area by CTA is pending  Incidental findings on CT:pending  CT Surgery risk assessment: moderate risk, per Dr Rob due to comorbidities  Rhythm issues: none  PFTs: FEV1 pending  Comorbidities: CKD/single kidney/PH

## 2017-11-18 NOTE — ASSESSMENT & PLAN NOTE
Creatinine at baseline. She has single kidney s/p donation. Will have to use low contrast technique during coronary evaluation if deemed necessary.

## 2017-11-18 NOTE — ASSESSMENT & PLAN NOTE
-likely contributing to PEDRO  -evaluated by Dr. Rob recently. Most recent ECHO does not qualify for CHARLES. Plan to discuss w valve team about candidacy for BAV  -strict I/O  -continue to diurese to euvolemia

## 2017-11-18 NOTE — PROGRESS NOTES
Pt arrived to floor via stretcher by transport. Pt AOx4, able to ambulate w/ standby assistance. VSS, sob reported only w/ exertion, no pain or distress. MD at bedside evaluating pt. Plan of care discussed, all questions answered. Bed in lowest position, side rails up x 2, call bell in reach. Pt oriented to room. Will continue to monitor.

## 2017-11-18 NOTE — ASSESSMENT & PLAN NOTE
-2/2 possibly due to under-diuresis in setting of worsening renal function vs medication or dietary noncompliance  -medication increased from lasix 20mg PO BID to 40 PO BID by nephrology 11/16, however, pt volume overloaded  -10/30/17: TTE- EF 60%, mod AS, PA 57.77, diastolic dysfunction  -elevated BNP 1,073  -CXR showing bilat pulm infiltrates  -hyprvolemic on exam, currently O2 sats at goal on room air  -s/p lasix 40mg IV x 1 in ED- not quantified. Per nurse ~600cc.  -start lasix 80mg IV BID now, uptitrate as needed.  -continue to monitor renal function  -strict I/O

## 2017-11-18 NOTE — HPI
Pr is 71 y F pt w PMHx of HTN, CAD s/p PCI to LAD 2011, HLD, history partial colectomy for polyps and colon CA(cancer free now), single kidney s/p kidney donation 1985, SLE, HFpEF (60), Mod aortic stenosis presenting with 2-3 mths hx of progressively worsening dyspnea on exertion and bilateral lower extremity edema.Reports a 1.5wk hx of orthopnea having to sleep propped up on 2 pillows, 2wk hx of wheezing, PEDRO with short distances, leg weakness, decreased appetite and bilat leg edema extending to thighs.  Denies coughing, cp, palpitations, abdominal distention, HA, changes in vision, presyncope, signs of infection including fever, chills, N/V, diarrhea or dysuria.  Pt reports medication and dietary compliance. Has noted urinating less after taking lasix. Dose increased to 40mg PO BID by nephrology yesterday without improvement on urine output. Does not weight herself regularly. No hx of COPD or asthma. Is a former 1.5PPD smoker, quit 15yrs ago.

## 2017-11-18 NOTE — H&P
[facility] Hospital Medicine  History & Physical    Patient Name: Ewelina Arnold  MRN: [id number]  Admission Date: 11/17/2017  Attending Physician: Kane Orellana MD   Primary Care Provider: Kalie Walton MD    [facility] Medicine Team: [facility] HOSP MED 4 Olga Vidales MD     Patient information was obtained from patient, past medical records and ER records.     Subjective:     Principal Problem:Acute on chronic diastolic congestive heart failure    Chief Complaint:   Chief Complaint   Patient presents with    Leg Swelling     Pt c/o bilateral lower extremity edema x 2 months.  Pt states she has seen her MD for same-given Lasix without improvement.        HPI: Pr is 71 y F pt w PMHx of HTN, CAD s/p PCI to LAD 2011, HLD, history partial colectomy for polyps and colon CA(cancer free now), single kidney s/p kidney donation 1985, SLE, HFpEF (60), Mod aortic stenosis presenting with 2-3 mths hx of progressively worsening dyspnea on exertion and bilateral lower extremity edema.Reports a 1.5wk hx of orthopnea having to sleep propped up on 2 pillows, 2wk hx of wheezing, PEDRO with short distances, leg weakness, decreased appetite and bilat leg edema extending to thighs.  Denies coughing, cp, palpitations, abdominal distention, HA, changes in vision, presyncope, signs of infection including fever, chills, N/V, diarrhea or dysuria.  Pt reports medication and dietary compliance. Has noted urinating less after taking lasix. Dose increased to 40mg PO BID by nephrology yesterday without improvement on urine output. Does not weight herself regularly. No hx of COPD or asthma. Is a former 1.5PPD smoker, quit 15yrs ago.        Past Medical History:   Diagnosis Date    Acute on chronic diastolic congestive heart failure 11/17/2017    Allergy     Anatomical narrow angle glaucoma     Anemia     States diagnosed about a month ago    Aortic atherosclerosis     Arthritis     Cataract     CHF  (congestive heart failure)     Chronic kidney disease     Chronic sciatica of left side     Right lower back pain due to sciatica    Coronary artery disease     Depression     Dry eyes     GERD (gastroesophageal reflux disease)     History of colon cancer     Hyperlipidemia     Hypertension     Hypothyroidism     Left ventricular diastolic dysfunction with preserved systolic function     Lupus (systemic lupus erythematosus) 8/17/2012    Malnutrition 5/16/2017    Osteoarthritis of cervical spine 8/17/2012    Osteopenia     PAD (peripheral artery disease)        Past Surgical History:   Procedure Laterality Date    CARDIAC CATHETERIZATION  07/27/2011    x1    CHOLECYSTECTOMY  1999    COLON SURGERY  2000 & 2003    partial removal    COLONOSCOPY N/A 4/14/2016    Procedure: COLONOSCOPY;  Surgeon: Jose Steen MD;  [facility]  Service: Endoscopy;  Laterality: N/A;  prep 2 days prior light meals only/clear liquid day before  and 4 dulcolax tabs (5 mgs) at noon    COLONOSCOPY N/A 9/14/2017    Procedure: COLONOSCOPY;  Surgeon: Jose Steen MD;  [facility]);  Service: Endoscopy;  Laterality: N/A;    EYE SURGERY      HAND SURGERY Bilateral 1996 & 1997    due to carpal tunnel     HERNIA REPAIR      HYSTERECTOMY  1983    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    Arthrotec 50 [diclofenac-misoprostol]      Other reaction(s): Unknown    Bentyl [dicyclomine]       Not effective    Doxycycline      nausea    Imdur [isosorbide mononitrate] Nausea Only    Lomotil [diphenoxylate-atropine]      Stomach cramps, pt vomitted    Lozol [indapamide] Rash    Tramadol Nausea Only       No current facility-administered medications on file prior to encounter.      Current Outpatient Prescriptions on File Prior to Encounter   Medication Sig    amLODIPine (NORVASC) 10 MG tablet Take 1 tablet (10 mg total) by mouth once daily.    aspirin (ECOTRIN) 81 MG EC tablet Take 1 tablet (81 mg total) by mouth once daily. (Patient taking differently: Take 81 mg by mouth once daily. 1 tablet every other day)    carvedilol (COREG) 25 MG tablet Take 1 tablet (25 mg total) by mouth 2 (two) times daily with meals.    citalopram (CELEXA) 20 MG tablet Take 1 tablet (20 mg total) by mouth once daily.    furosemide (LASIX) 20 MG tablet Take 1 tablet (20 mg total) by mouth 2 (two) times daily.    hydrALAZINE (APRESOLINE) 10 MG tablet TAKE 2 TABLETS EVERY 12 HOURS    hydroxychloroquine (PLAQUENIL) 200 mg tablet TAKE 1 TABLET TWICE DAILY    levothyroxine (SYNTHROID) 75 MCG tablet Take 1 tablet (75 mcg total) by mouth before breakfast.    rosuvastatin (CRESTOR) 40 MG Tab Take 1 tablet (40 mg total) by mouth every evening.    acetaminophen (TYLENOL) 500 MG tablet Take 500 mg by mouth every 6 (six) hours as needed for Pain.    ammonium lactate 12 % Crea Apply 1 application topically 2 (two) times daily as needed.    betamethasone dipropionate (DIPROLENE) 0.05 % cream Apply topically 2 (two) times daily as needed.    butalbital-aspirin-caffeine -40 mg (FIORINAL) -40 mg Cap Take 1 capsule by mouth every 6 (six) hours as needed.     cholestyramine (QUESTRAN) 4 gram packet Take 1 packet (4 g total) by mouth 3 (three) times daily with meals.    conjugated estrogens (PREMARIN) vaginal cream Use 1g nightly for two weeks, then 1g twice per week.    fexofenadine (ALLEGRA) 180 MG tablet Take 1  tablet (180 mg total) by mouth once daily.    hydrochlorothiazide (HYDRODIURIL) 25 MG tablet Take 1 tablet (25 mg total) by mouth once daily.    hyoscyamine (LEVSIN/SL) 0.125 mg Subl Place 1 tablet (0.125 mg total) under the tongue 2 (two) times daily as needed.    ipratropium (ATROVENT) 0.03 % nasal spray 2 sprays by Nasal route 2 (two) times daily as needed for Rhinitis.    latanoprost 0.005 % ophthalmic solution INSTILL 1 DROP INTO BOTH EYES EVERY DAY    meclizine (ANTIVERT) 12.5 mg tablet Take 1 tablet (12.5 mg total) by mouth 3 (three) times daily as needed.    mometasone (ELOCON) 0.1 % ointment Apply topically once daily.    multivitamin capsule Take 1 capsule by mouth once daily.    nitroGLYCERIN (NITROSTAT) 0.4 MG SL tablet PLACE 1 TABLET UNDER THE TONGUE EVERY 5 MINUTES FOR 3 DOSES --IF NOT RELIEVED GO TO THE ER    nystatin (MYCOSTATIN) ointment Apply topically 2 (two) times daily.    nystatin-triamcinolone (MYCOLOG II) cream Apply topically 4 (four) times daily.    omeprazole (PRILOSEC) 40 MG capsule Take 1 capsule (40 mg total) by mouth once daily.    TENS units Sravanthi 1 application by Misc.(Non-Drug; Combo Route) route daily as needed (pain).    triamcinolone acetonide 0.1% (KENALOG) 0.1 % paste PLACE ONTO TEETH TWICE DAILY (Patient taking differently: PLACE ONTO TEETH PRN)    VITAMIN D2 50,000 unit capsule TAKE 1 CAPSULE (50,000 UNITS TOTAL) EVERY 30 DAYS     Family History     Problem Relation (Age of Onset)    Diabetes Maternal Grandmother, Son, Maternal Aunt    Heart attack Mother    Heart disease Father    Hypertension Mother, Father, Sister, Brother    Kidney disease Brother    Kidney failure Brother    No Known Problems Daughter, Maternal Grandfather, Paternal Grandmother, Paternal Grandfather        Social History Main Topics    Smoking status: Former Smoker     Packs/day: 1.50     Years: 30.00     Types: Cigarettes     Start date: 1955     Quit date: 3/13/1985    Smokeless tobacco:  Never Used    Alcohol use No    Drug use: No    Sexual activity: Not Currently     Partners: Male     Birth control/ protection: Abstinence     Review of Systems   Constitutional: Positive for activity change, appetite change and fatigue. Negative for chills, diaphoresis and fever.   HENT: Negative for sinus pressure, sneezing, sore throat and tinnitus.    Eyes: Negative for visual disturbance.   Respiratory: Positive for chest tightness, shortness of breath and wheezing. Negative for cough.    Cardiovascular: Positive for leg swelling. Negative for chest pain and palpitations.   Gastrointestinal: Negative for abdominal distention, constipation, diarrhea, nausea and vomiting.   Endocrine: Negative for polyphagia and polyuria.   Genitourinary: Negative for dysuria and frequency.   Musculoskeletal: Negative for arthralgias, back pain and gait problem.   Neurological: Negative for tremors, seizures, syncope, light-headedness, numbness and headaches.   Hematological: Does not bruise/bleed easily.   Psychiatric/Behavioral: Negative for agitation and confusion.     Objective:     Vital Signs (Most Recent):  Temp: 97.6 °F (36.4 °C) (11/18/17 0006)  Pulse: 72 (11/18/17 0006)  Resp: 18 (11/18/17 0006)  BP: 128/78 (11/18/17 0006)  SpO2: 95 % (11/18/17 0006) Vital Signs (24h Range):  Temp:  [97.6 °F (36.4 °C)-98.4 °F (36.9 °C)] 97.6 °F (36.4 °C)  Pulse:  [64-72] 72  Resp:  [18] 18  SpO2:  [95 %-98 %] 95 %  BP: (128-167)/(78-97) 128/78     Weight: 61.6 kg (135 lb 12.9 oz)  Body mass index is 22.6 kg/m².    Physical Exam   Constitutional: She is oriented to person, place, and time. She appears well-developed and well-nourished. No distress.   NAD breathing comfortably on RA   HENT:   Head: Normocephalic and atraumatic.   Right Ear: External ear normal.   Left Ear: External ear normal.   Nose: Nose normal.   Mouth/Throat: Oropharynx is clear and moist. No oropharyngeal exudate.   Eyes: Conjunctivae and EOM are normal. Pupils  are equal, round, and reactive to light. No scleral icterus.   Neck: Normal range of motion. Neck supple. JVD present.   Cardiovascular: Normal rate, regular rhythm and intact distal pulses.    3/6 systolic murmur   Pulmonary/Chest: Effort normal and breath sounds normal. No respiratory distress. She has no wheezes. She has no rales.   Abdominal: Soft. Bowel sounds are normal. She exhibits no distension. There is no tenderness. There is no guarding.   Well-healed vertical scar at mid abdomen   Musculoskeletal: Normal range of motion. She exhibits edema. Tenderness: 3+ pitting edema on lower extremities extending up to thighs.   Lymphadenopathy:     She has no cervical adenopathy.   Neurological: She is alert and oriented to person, place, and time. No cranial nerve deficit or sensory deficit.   Skin: Skin is warm and dry. She is not diaphoretic.   Psychiatric: She has a normal mood and affect. Her behavior is normal. Judgment and thought content normal.        Significant Labs:   CBC:   Recent Labs  Lab 11/17/17 2118   WBC 3.61*   HGB 9.7*   HCT 30.9*        CMP:   Recent Labs  Lab 11/17/17 2118      K 4.3   *   CO2 19*   GLU 98   BUN 40*   CREATININE 1.9*   CALCIUM 8.7   PROT 7.3   ALBUMIN 3.0*   BILITOT 0.2   ALKPHOS 78   AST 42*   ALT 20   ANIONGAP 6*   EGFRNONAA 26.1*     Magnesium: No results for input(s): MG in the last 48 hours.  Troponin:   Recent Labs  Lab 11/17/17 2118   TROPONINI 0.074*     All pertinent labs within the past 24 hours have been reviewed.    Significant Imaging: I have reviewed and interpreted all pertinent imaging results/findings within the past 24 hours.    Assessment/Plan:     * Acute on chronic diastolic congestive heart failure    -2/2 possibly due to under-diuresis in setting of worsening renal function vs medication or dietary noncompliance  -medication increased from lasix 20mg PO BID to 40 PO BID by nephrology 11/16, however, pt volume overloaded  -10/30/17:  "TTE- EF 60%, mod AS, PA 57.77, diastolic dysfunction  -elevated BNP 1,073  -CXR showing bilat pulm infiltrates  -hyprvolemic on exam, currently O2 sats at goal on room air  -s/p lasix 40mg IV x 1 in ED- not quantified. Per nurse ~600cc.  -start lasix 80mg IV BID now, uptitrate as needed.  -continue to monitor renal function  -strict I/O            Elevated troponin    -likely due to demand ischemia in setting of ADHF  -no chest pain   -EKG without ischemic changes  -trop 0.074; will trend        Acute on chronic kidney failure    -likely pre-renal in setting of ADHF  -elevation in Cr=>3 from baseline. Cr 1.9 (baseline 1.4-1.6)  -s/p lasix 40mg IV in ED w approximately 600cc urine output  -continue diuresis pending kidney function stable  -avoid nephrotoxic agents (not on ACE at home)          Aortic stenosis    -likely contributing to PEDRO  -evaluated by Dr. Rob recently. Most recent ECHO does not qualify for CHARLES. Plan to discuss w valve team about candidacy for BAV  -strict I/O  -continue to diurese to euvolemia          Anemia of other chronic disease    -hgb stable at 9.7  -s/p workup for anemia by heme onc (note 11/6/17)  -daily cbc          Major depressive disorder with single episode, in full remission    -continue celexa 20mg PO daily          Hyperlipidemia    -cont rosuvastatin 40mg PO daily          Postsurgical hypothyroidism    -continue synthroid 75mcg PO daily          Hypertension    -on hydralazine 20mg PO BID, norvasc 10mg PO BID, coreg 25mg PO BID at home  -BP currently at goal  -resume in am as needed          CAD (coronary artery disease)    -on asa 81mg ("every other day") will continue  -resume BB in AM  -continue statin          Lupus (systemic lupus erythematosus)    -continue hydroxychloroquine 200mg PO daily            VTE Risk Mitigation         Ordered     heparin (porcine) injection 5,000 Units  Every 12 hours     Route:  Subcutaneous        11/17/17 6110     Medium Risk of VTE  " Once      11/17/17 4582             Olga Vidales MD  Department of Hospital Medicine   [facility]

## 2017-11-18 NOTE — SUBJECTIVE & OBJECTIVE
Past Medical History:   Diagnosis Date    Acute on chronic diastolic congestive heart failure 11/17/2017    Allergy     Anatomical narrow angle glaucoma     Anemia     States diagnosed about a month ago    Aortic atherosclerosis     Arthritis     Cataract     CHF (congestive heart failure)     Chronic kidney disease     Chronic sciatica of left side     Right lower back pain due to sciatica    Coronary artery disease     Depression     Dry eyes     GERD (gastroesophageal reflux disease)     History of colon cancer     Hyperlipidemia     Hypertension     Hypothyroidism     Left ventricular diastolic dysfunction with preserved systolic function     Lupus (systemic lupus erythematosus) 8/17/2012    Malnutrition 5/16/2017    Osteoarthritis of cervical spine 8/17/2012    Osteopenia     PAD (peripheral artery disease)        Past Surgical History:   Procedure Laterality Date    CARDIAC CATHETERIZATION  07/27/2011    x1    CHOLECYSTECTOMY  1999    COLON SURGERY  2000 & 2003    partial removal    COLONOSCOPY N/A 4/14/2016    Procedure: COLONOSCOPY;  Surgeon: Jose Steen MD;  Location: [facility] ([address] [place] [address]);  Service: Endoscopy;  Laterality: N/A;  prep 2 days prior light meals only/clear liquid day before  and 4 dulcolax tabs (5 mgs) at noon    COLONOSCOPY N/A 9/14/2017    Procedure: COLONOSCOPY;  Surgeon: Jose Steen MD;  Location: [facility] ([address] [place] [address]);  Service: Endoscopy;  Laterality: N/A;    EYE SURGERY      HAND SURGERY Bilateral 1996 & 1997    due to carpal tunnel     HERNIA REPAIR      HYSTERECTOMY  1983    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    Arthrotec 50 [diclofenac-misoprostol]      Other reaction(s): Unknown    Bentyl [dicyclomine]      Not effective    Doxycycline      nausea    Imdur [isosorbide mononitrate] Nausea Only    Lomotil [diphenoxylate-atropine]      Stomach cramps, pt vomitted    Lozol [indapamide] Rash    Tramadol Nausea Only       No current facility-administered medications on file prior to encounter.      Current Outpatient Prescriptions on File Prior to Encounter   Medication Sig    amLODIPine (NORVASC) 10 MG tablet Take 1 tablet (10 mg total) by mouth once daily.    aspirin (ECOTRIN) 81 MG EC tablet Take 1 tablet (81 mg total) by mouth once daily. (Patient taking differently: Take 81 mg by mouth once daily. 1 tablet every other day)    carvedilol (COREG) 25 MG tablet Take 1 tablet (25 mg total) by mouth 2 (two) times daily with meals.    citalopram (CELEXA) 20 MG tablet Take 1 tablet (20 mg total) by mouth once daily.    furosemide (LASIX) 20 MG tablet Take 1 tablet (20 mg total) by mouth 2 (two) times daily.    hydrALAZINE (APRESOLINE) 10 MG tablet TAKE 2 TABLETS EVERY 12 HOURS    hydroxychloroquine (PLAQUENIL) 200 mg tablet TAKE 1 TABLET TWICE DAILY    levothyroxine (SYNTHROID) 75 MCG tablet Take 1 tablet (75 mcg total) by mouth before breakfast.    rosuvastatin (CRESTOR) 40 MG Tab Take 1 tablet (40 mg total) by mouth every evening.    acetaminophen (TYLENOL) 500 MG tablet Take 500 mg by mouth every 6 (six) hours as needed for Pain.    ammonium lactate 12 % Crea Apply 1 application topically 2 (two) times daily as needed.    betamethasone dipropionate (DIPROLENE) 0.05 % cream Apply topically 2 (two) times daily as needed.    butalbital-aspirin-caffeine -40 mg (FIORINAL) -40 mg Cap Take 1 capsule by mouth every 6 (six) hours as  needed.     cholestyramine (QUESTRAN) 4 gram packet Take 1 packet (4 g total) by mouth 3 (three) times daily with meals.    conjugated estrogens (PREMARIN) vaginal cream Use 1g nightly for two weeks, then 1g twice per week.    fexofenadine (ALLEGRA) 180 MG tablet Take 1 tablet (180 mg total) by mouth once daily.    hydrochlorothiazide (HYDRODIURIL) 25 MG tablet Take 1 tablet (25 mg total) by mouth once daily.    hyoscyamine (LEVSIN/SL) 0.125 mg Subl Place 1 tablet (0.125 mg total) under the tongue 2 (two) times daily as needed.    ipratropium (ATROVENT) 0.03 % nasal spray 2 sprays by Nasal route 2 (two) times daily as needed for Rhinitis.    latanoprost 0.005 % ophthalmic solution INSTILL 1 DROP INTO BOTH EYES EVERY DAY    meclizine (ANTIVERT) 12.5 mg tablet Take 1 tablet (12.5 mg total) by mouth 3 (three) times daily as needed.    mometasone (ELOCON) 0.1 % ointment Apply topically once daily.    multivitamin capsule Take 1 capsule by mouth once daily.    nitroGLYCERIN (NITROSTAT) 0.4 MG SL tablet PLACE 1 TABLET UNDER THE TONGUE EVERY 5 MINUTES FOR 3 DOSES --IF NOT RELIEVED GO TO THE ER    nystatin (MYCOSTATIN) ointment Apply topically 2 (two) times daily.    nystatin-triamcinolone (MYCOLOG II) cream Apply topically 4 (four) times daily.    omeprazole (PRILOSEC) 40 MG capsule Take 1 capsule (40 mg total) by mouth once daily.    TENS units Sravanthi 1 application by Misc.(Non-Drug; Combo Route) route daily as needed (pain).    triamcinolone acetonide 0.1% (KENALOG) 0.1 % paste PLACE ONTO TEETH TWICE DAILY (Patient taking differently: PLACE ONTO TEETH PRN)    VITAMIN D2 50,000 unit capsule TAKE 1 CAPSULE (50,000 UNITS TOTAL) EVERY 30 DAYS     Family History     Problem Relation (Age of Onset)    Diabetes Maternal Grandmother, Son, Maternal Aunt    Heart attack Mother    Heart disease Father    Hypertension Mother, Father, Sister, Brother    Kidney disease Brother    Kidney failure Brother    No Known  Problems Daughter, Maternal Grandfather, Paternal Grandmother, Paternal Grandfather        Social History Main Topics    Smoking status: Former Smoker     Packs/day: 1.50     Years: 30.00     Types: Cigarettes     Start date: 1955     Quit date: 3/13/1985    Smokeless tobacco: Never Used    Alcohol use No    Drug use: No    Sexual activity: Not Currently     Partners: Male     Birth control/ protection: Abstinence     Review of Systems   Constitutional: Positive for activity change, appetite change and fatigue. Negative for chills, diaphoresis and fever.   HENT: Negative for sinus pressure, sneezing, sore throat and tinnitus.    Eyes: Negative for visual disturbance.   Respiratory: Positive for chest tightness, shortness of breath and wheezing. Negative for cough.    Cardiovascular: Positive for leg swelling. Negative for chest pain and palpitations.   Gastrointestinal: Negative for abdominal distention, constipation, diarrhea, nausea and vomiting.   Endocrine: Negative for polyphagia and polyuria.   Genitourinary: Negative for dysuria and frequency.   Musculoskeletal: Negative for arthralgias, back pain and gait problem.   Neurological: Negative for tremors, seizures, syncope, light-headedness, numbness and headaches.   Hematological: Does not bruise/bleed easily.   Psychiatric/Behavioral: Negative for agitation and confusion.     Objective:     Vital Signs (Most Recent):  Temp: 97.6 °F (36.4 °C) (11/18/17 0006)  Pulse: 72 (11/18/17 0006)  Resp: 18 (11/18/17 0006)  BP: 128/78 (11/18/17 0006)  SpO2: 95 % (11/18/17 0006) Vital Signs (24h Range):  Temp:  [97.6 °F (36.4 °C)-98.4 °F (36.9 °C)] 97.6 °F (36.4 °C)  Pulse:  [64-72] 72  Resp:  [18] 18  SpO2:  [95 %-98 %] 95 %  BP: (128-167)/(78-97) 128/78     Weight: 61.6 kg (135 lb 12.9 oz)  Body mass index is 22.6 kg/m².    Physical Exam   Constitutional: She is oriented to person, place, and time. She appears well-developed and well-nourished. No distress.   NAD  breathing comfortably on RA   HENT:   Head: Normocephalic and atraumatic.   Right Ear: External ear normal.   Left Ear: External ear normal.   Nose: Nose normal.   Mouth/Throat: Oropharynx is clear and moist. No oropharyngeal exudate.   Eyes: Conjunctivae and EOM are normal. Pupils are equal, round, and reactive to light. No scleral icterus.   Neck: Normal range of motion. Neck supple. JVD present.   Cardiovascular: Normal rate, regular rhythm and intact distal pulses.    3/6 systolic murmur   Pulmonary/Chest: Effort normal and breath sounds normal. No respiratory distress. She has no wheezes. She has no rales.   Abdominal: Soft. Bowel sounds are normal. She exhibits no distension. There is no tenderness. There is no guarding.   Well-healed vertical scar at mid abdomen   Musculoskeletal: Normal range of motion. She exhibits edema. Tenderness: 3+ pitting edema on lower extremities extending up to thighs.   Lymphadenopathy:     She has no cervical adenopathy.   Neurological: She is alert and oriented to person, place, and time. No cranial nerve deficit or sensory deficit.   Skin: Skin is warm and dry. She is not diaphoretic.   Psychiatric: She has a normal mood and affect. Her behavior is normal. Judgment and thought content normal.        Significant Labs:   CBC:   Recent Labs  Lab 11/17/17 2118   WBC 3.61*   HGB 9.7*   HCT 30.9*        CMP:   Recent Labs  Lab 11/17/17 2118      K 4.3   *   CO2 19*   GLU 98   BUN 40*   CREATININE 1.9*   CALCIUM 8.7   PROT 7.3   ALBUMIN 3.0*   BILITOT 0.2   ALKPHOS 78   AST 42*   ALT 20   ANIONGAP 6*   EGFRNONAA 26.1*     Magnesium: No results for input(s): MG in the last 48 hours.  Troponin:   Recent Labs  Lab 11/17/17 2118   TROPONINI 0.074*     All pertinent labs within the past 24 hours have been reviewed.    Significant Imaging: I have reviewed and interpreted all pertinent imaging results/findings within the past 24 hours.

## 2017-11-18 NOTE — ASSESSMENT & PLAN NOTE
-likely pre-renal in setting of ADHF  -elevation in Cr=>3 from baseline. Cr 1.9 (baseline 1.4-1.6)  -s/p lasix 40mg IV in ED w approximately 600cc urine output  -continue diuresis pending kidney function stable  -avoid nephrotoxic agents (not on ACE at home)

## 2017-11-18 NOTE — PLAN OF CARE
Problem: Patient Care Overview  Goal: Plan of Care Review  Outcome: Ongoing (interventions implemented as appropriate)  Plan of care discussed with patient, no new questions at this time. VSS, denies SOB, no complaint of pain. Pt being diuresed with Lasix, I's and O's being measured. Labs being monitored. Safety precautions taken, no falls/trauma during the shift. Will continue to monitor.

## 2017-11-18 NOTE — ASSESSMENT & PLAN NOTE
-on hydralazine 20mg PO BID, norvasc 10mg PO BID, coreg 25mg PO BID at home  -BP currently at goal  -will continue

## 2017-11-19 VITALS
SYSTOLIC BLOOD PRESSURE: 142 MMHG | WEIGHT: 133.63 LBS | RESPIRATION RATE: 18 BRPM | HEART RATE: 55 BPM | DIASTOLIC BLOOD PRESSURE: 68 MMHG | TEMPERATURE: 98 F | HEIGHT: 65 IN | BODY MASS INDEX: 22.26 KG/M2 | OXYGEN SATURATION: 97 %

## 2017-11-19 LAB
ANION GAP SERPL CALC-SCNC: 9 MMOL/L
BASOPHILS # BLD AUTO: 0.03 K/UL
BASOPHILS NFR BLD: 0.8 %
BUN SERPL-MCNC: 43 MG/DL
CALCIUM SERPL-MCNC: 8.6 MG/DL
CHLORIDE SERPL-SCNC: 111 MMOL/L
CO2 SERPL-SCNC: 20 MMOL/L
CREAT SERPL-MCNC: 1.9 MG/DL
DIFFERENTIAL METHOD: ABNORMAL
EOSINOPHIL # BLD AUTO: 0.1 K/UL
EOSINOPHIL NFR BLD: 3.3 %
ERYTHROCYTE [DISTWIDTH] IN BLOOD BY AUTOMATED COUNT: 14.5 %
EST. GFR  (AFRICAN AMERICAN): 30.1 ML/MIN/1.73 M^2
EST. GFR  (NON AFRICAN AMERICAN): 26.1 ML/MIN/1.73 M^2
GLUCOSE SERPL-MCNC: 67 MG/DL
HCT VFR BLD AUTO: 25.4 %
HGB BLD-MCNC: 8.1 G/DL
IMM GRANULOCYTES # BLD AUTO: 0.01 K/UL
IMM GRANULOCYTES NFR BLD AUTO: 0.3 %
LYMPHOCYTES # BLD AUTO: 1.2 K/UL
LYMPHOCYTES NFR BLD: 28.8 %
MAGNESIUM SERPL-MCNC: 1.6 MG/DL
MCH RBC QN AUTO: 25.1 PG
MCHC RBC AUTO-ENTMCNC: 31.9 G/DL
MCV RBC AUTO: 79 FL
MONOCYTES # BLD AUTO: 0.7 K/UL
MONOCYTES NFR BLD: 17 %
NEUTROPHILS # BLD AUTO: 2 K/UL
NEUTROPHILS NFR BLD: 49.8 %
NRBC BLD-RTO: 0 /100 WBC
PHOSPHATE SERPL-MCNC: 4.7 MG/DL
PLATELET # BLD AUTO: 183 K/UL
PMV BLD AUTO: 10.5 FL
POTASSIUM SERPL-SCNC: 4.1 MMOL/L
RBC # BLD AUTO: 3.23 M/UL
SODIUM SERPL-SCNC: 140 MMOL/L
WBC # BLD AUTO: 4 K/UL

## 2017-11-19 PROCEDURE — 25000003 PHARM REV CODE 250: Performed by: INTERNAL MEDICINE

## 2017-11-19 PROCEDURE — 80048 BASIC METABOLIC PNL TOTAL CA: CPT

## 2017-11-19 PROCEDURE — 25000003 PHARM REV CODE 250: Performed by: STUDENT IN AN ORGANIZED HEALTH CARE EDUCATION/TRAINING PROGRAM

## 2017-11-19 PROCEDURE — 36415 COLL VENOUS BLD VENIPUNCTURE: CPT

## 2017-11-19 PROCEDURE — 99239 HOSP IP/OBS DSCHRG MGMT >30: CPT | Mod: GC,,, | Performed by: HOSPITALIST

## 2017-11-19 PROCEDURE — 63600175 PHARM REV CODE 636 W HCPCS: Performed by: INTERNAL MEDICINE

## 2017-11-19 PROCEDURE — 84100 ASSAY OF PHOSPHORUS: CPT

## 2017-11-19 PROCEDURE — 85025 COMPLETE CBC W/AUTO DIFF WBC: CPT

## 2017-11-19 PROCEDURE — 83735 ASSAY OF MAGNESIUM: CPT

## 2017-11-19 RX ORDER — BUMETANIDE 1 MG/1
1 TABLET ORAL 2 TIMES DAILY
Status: DISCONTINUED | OUTPATIENT
Start: 2017-11-19 | End: 2017-11-19 | Stop reason: HOSPADM

## 2017-11-19 RX ORDER — BUMETANIDE 1 MG/1
1 TABLET ORAL DAILY
Status: DISCONTINUED | OUTPATIENT
Start: 2017-11-19 | End: 2017-11-19

## 2017-11-19 RX ORDER — LANOLIN ALCOHOL/MO/W.PET/CERES
400 CREAM (GRAM) TOPICAL ONCE
Status: COMPLETED | OUTPATIENT
Start: 2017-11-19 | End: 2017-11-19

## 2017-11-19 RX ORDER — BUMETANIDE 1 MG/1
1 TABLET ORAL 2 TIMES DAILY
Status: DISCONTINUED | OUTPATIENT
Start: 2017-11-20 | End: 2017-11-19

## 2017-11-19 RX ORDER — METOPROLOL SUCCINATE 50 MG/1
50 TABLET, EXTENDED RELEASE ORAL DAILY
Status: DISCONTINUED | OUTPATIENT
Start: 2017-11-20 | End: 2017-11-19 | Stop reason: HOSPADM

## 2017-11-19 RX ORDER — METOPROLOL SUCCINATE 50 MG/1
50 TABLET, EXTENDED RELEASE ORAL DAILY
Qty: 30 TABLET | Refills: 11 | Status: SHIPPED | OUTPATIENT
Start: 2017-11-20 | End: 2017-11-22 | Stop reason: SDUPTHER

## 2017-11-19 RX ORDER — BUMETANIDE 1 MG/1
1 TABLET ORAL 2 TIMES DAILY
Qty: 60 TABLET | Refills: 11 | Status: SHIPPED | OUTPATIENT
Start: 2017-11-19 | End: 2017-11-22 | Stop reason: SDUPTHER

## 2017-11-19 RX ADMIN — ASPIRIN 81 MG: 81 TABLET, COATED ORAL at 08:11

## 2017-11-19 RX ADMIN — AMLODIPINE BESYLATE 10 MG: 10 TABLET ORAL at 08:11

## 2017-11-19 RX ADMIN — SODIUM BICARBONATE 650 MG TABLET 650 MG: at 08:11

## 2017-11-19 RX ADMIN — CITALOPRAM HYDROBROMIDE 20 MG: 20 TABLET ORAL at 08:11

## 2017-11-19 RX ADMIN — MAGNESIUM OXIDE TAB 400 MG (241.3 MG ELEMENTAL MG) 400 MG: 400 (241.3 MG) TAB at 11:11

## 2017-11-19 RX ADMIN — HEPARIN SODIUM 5000 UNITS: 5000 INJECTION, SOLUTION INTRAVENOUS; SUBCUTANEOUS at 08:11

## 2017-11-19 RX ADMIN — LEVOTHYROXINE SODIUM 75 MCG: 75 TABLET ORAL at 05:11

## 2017-11-19 RX ADMIN — HYDROXYCHLOROQUINE SULFATE 200 MG: 200 TABLET, FILM COATED ORAL at 08:11

## 2017-11-19 RX ADMIN — BUMETANIDE 1 MG: 1 TABLET ORAL at 11:11

## 2017-11-19 RX ADMIN — HYDRALAZINE HYDROCHLORIDE 25 MG: 25 TABLET, FILM COATED ORAL at 05:11

## 2017-11-19 RX ADMIN — CARVEDILOL 25 MG: 25 TABLET, FILM COATED ORAL at 08:11

## 2017-11-19 RX ADMIN — FUROSEMIDE 80 MG: 10 INJECTION, SOLUTION INTRAVENOUS at 08:11

## 2017-11-19 NOTE — HOSPITAL COURSE
Ms. Arnold was admitted for volume overload and SOB. Initially it was thought that acute symptoms were due to aortic stenosis, and may require urgent cardiac surgery vs TAVR. However, her Cardiologist feels that her symptoms are due to diastolic dysfunction and uncontrolled HTN. She was diuresed with lasix 80mg IV bid. Coreg was changed to Toprol XL, and lasix was switched to bumex. Pt will follow-up with her Cardiologist, and Interventional Cardiology as an outpatient within 1 week.

## 2017-11-19 NOTE — DISCHARGE SUMMARY
[facility] Hospital Medicine  Discharge Summary      Patient Name: Ewelina Arnold  MRN: [id number]  Admission Date: 11/17/2017  Hospital Length of Stay: 2 days  Discharge Date and Time:  11/19/2017 12:31 PM  Attending Physician: Kane Orellana MD   Discharging Provider: Francisco Jaeger MD  Primary Care Provider: Kalie Walton MD  Hospital Medicine Team: [facility] MED 4 Francisco Jaeger MD    HPI:   Pr is 71 y F pt w PMHx of HTN, CAD s/p PCI to LAD 2011, HLD, history partial colectomy for polyps and colon CA(cancer free now), single kidney s/p kidney donation 1985, SLE, HFpEF (60), Mod aortic stenosis presenting with 2-3 mths hx of progressively worsening dyspnea on exertion and bilateral lower extremity edema.Reports a 1.5wk hx of orthopnea having to sleep propped up on 2 pillows, 2wk hx of wheezing, PEDRO with short distances, leg weakness, decreased appetite and bilat leg edema extending to thighs.  Denies coughing, cp, palpitations, abdominal distention, HA, changes in vision, presyncope, signs of infection including fever, chills, N/V, diarrhea or dysuria.  Pt reports medication and dietary compliance. Has noted urinating less after taking lasix. Dose increased to 40mg PO BID by nephrology yesterday without improvement on urine output. Does not weight herself regularly. No hx of COPD or asthma. Is a former 1.5PPD smoker, quit 15yrs ago.        * No surgery found *      Hospital Course:   Ms. Arnold was admitted for volume overload and SOB. Initially it was thought that acute symptoms were due to aortic stenosis, and may require urgent cardiac surgery vs TAVR. However, her Cardiologist feels that her symptoms are due to diastolic dysfunction and uncontrolled HTN. She was diuresed with lasix 80mg IV bid. Coreg was changed to Toprol XL, and lasix was switched to bumex. Pt will follow-up with her Cardiologist, and Interventional Cardiology as an outpatient within 1 week.      Consults:    Consults         Status Ordering Provider     Inpatient consult to Interventional Cardiology  Once     Provider:  (Not yet assigned)    RUPESH Jett          No new Assessment & Plan notes have been filed under this hospital service since the last note was generated.  Service: Hospital Medicine    Final Active Diagnoses:    Diagnosis Date Noted POA    PRINCIPAL PROBLEM:  Aortic stenosis, moderate [I35.0] 11/18/2017 Yes    Acute on chronic kidney failure [N17.9, N18.9] 11/18/2017 No    Elevated troponin [R74.8] 11/18/2017 Yes    Acute on chronic diastolic congestive heart failure [I50.33] 11/17/2017 Yes    Aortic stenosis [I35.0] 08/17/2017 Yes    Major depressive disorder with single episode, in full remission [F32.5] 05/16/2017 Yes    Hyperlipidemia [E78.5]  Yes    Postsurgical hypothyroidism [E89.0] 05/13/2015 Yes    Hypertension [I10] 10/05/2012 Yes     Chronic    CAD (coronary artery disease) [I25.10] 10/05/2012 Yes     Chronic    Lupus (systemic lupus erythematosus) [M32.9] 08/17/2012 Yes     Chronic      Problems Resolved During this Admission:    Diagnosis Date Noted Date Resolved POA       Discharged Condition: good    Disposition: Home or Self Care    Follow Up:  Follow-up Information     [facility] - Cardiology.    Specialty:  Cardiology  Contact information:  [address]  [facility] [address] [phone]  Additional information:  3rd floor               Patient Instructions:     Diet general     Activity as tolerated     Call MD for:  temperature >100.4     Call MD for:  persistent nausea and vomiting or diarrhea     Call MD for:  severe uncontrolled pain     Call MD for:  redness, tenderness, or signs of infection (pain, swelling, redness, odor or green/yellow discharge around incision site)     Call MD for:  difficulty breathing or increased cough     Call MD for:  severe persistent headache     Call MD for:  worsening rash     Call MD for:  persistent  "dizziness, light-headedness, or visual disturbances     Call MD for:  increased confusion or weakness       BP (!) 142/68 (BP Location: Left arm, Patient Position: Lying)   Pulse (!) 55   Temp 98.3 °F (36.8 °C) (Oral)   Resp 18   Ht 5' 5" (1.651 m)   Wt 60.6 kg (133 lb 9.6 oz)   LMP  (LMP Unknown)   SpO2 97%   Breastfeeding? No   BMI 22.23 kg/m²     Physical Exam   Constitutional: She is oriented to person, place, and time. She appears well-developed and well-nourished. No distress.   HENT:   Head: Normocephalic and atraumatic.   Right Ear: External ear normal.   Left Ear: External ear normal.   Nose: Nose normal.   Mouth/Throat: Oropharynx is clear and moist. No oropharyngeal exudate.   Eyes: Conjunctivae and EOM are normal. Pupils are equal, round, and reactive to light. No scleral icterus.   Neck: Normal range of motion. Neck supple. JVD present.   Cardiovascular: Normal rate, regular rhythm and intact distal pulses.    3/6 systolic murmur at aortic area  Pulmonary/Chest: Effort normal and breath sounds normal. No respiratory distress. She has no wheezes. She has no rales.   Abdominal: Soft. Bowel sounds are normal. She exhibits no distension. There is no tenderness. There is no guarding.   Well-healed vertical scar at mid abdomen   Musculoskeletal: Normal range of motion. She exhibits edema. Tenderness: 3+ pitting edema BLLE  Lymphadenopathy:     She has no cervical adenopathy.   Neurological: She is alert and oriented to person, place, and time. No cranial nerve deficit or sensory deficit.   Skin: Skin is warm and dry. She is not diaphoretic.   Psychiatric: She has a normal mood and affect. Her behavior is normal. Judgment and thought content normal.     Significant Diagnostic Studies:   CBC:   Recent Labs  Lab 11/17/17  2118 11/18/17  0444 11/19/17  0340   WBC 3.61* 4.04 4.00   RBC 3.88* 3.36* 3.23*   HGB 9.7* 8.6* 8.1*   HCT 30.9* 26.7* 25.4*    191 183   MCV 80* 80* 79*   MCH 25.0* 25.6* " 25.1*   MCHC 31.4* 32.2 31.9*     BMP:   Recent Labs  Lab 11/18/17  0444 11/18/17  1750 11/19/17  0340 11/19/17  0841   GLU 76 74  --  67*    139  --  140   K 3.8 4.1  --  4.1   * 111*  --  111*   CO2 17* 19*  --  20*   BUN 39* 40*  --  43*   CREATININE 1.9* 1.8*  --  1.9*   CALCIUM 8.4* 8.4*  --  8.6*   MG 1.6  --  1.6  --      Coagulation: No results for input(s): INR, APTT in the last 168 hours.    Invalid input(s): PT  Microbiology Results (last 7 days)     ** No results found for the last 168 hours. **            Pending Diagnostic Studies:     None         Medications:  Reconciled Home Medications:   Current Discharge Medication List      START taking these medications    Details   bumetanide (BUMEX) 1 MG tablet Take 1 tablet (1 mg total) by mouth 2 (two) times daily.  Qty: 60 tablet, Refills: 11      metoprolol succinate (TOPROL-XL) 50 MG 24 hr tablet Take 1 tablet (50 mg total) by mouth once daily.  Qty: 30 tablet, Refills: 11         CONTINUE these medications which have NOT CHANGED    Details   amLODIPine (NORVASC) 10 MG tablet Take 1 tablet (10 mg total) by mouth once daily.  Qty: 90 tablet, Refills: 0      aspirin (ECOTRIN) 81 MG EC tablet Take 1 tablet (81 mg total) by mouth once daily.  Refills: 0      citalopram (CELEXA) 20 MG tablet Take 1 tablet (20 mg total) by mouth once daily.  Qty: 90 tablet, Refills: 1      hydrALAZINE (APRESOLINE) 10 MG tablet TAKE 2 TABLETS EVERY 12 HOURS  Qty: 360 tablet, Refills: 3    Associated Diagnoses: Essential hypertension      hydroxychloroquine (PLAQUENIL) 200 mg tablet TAKE 1 TABLET TWICE DAILY  Qty: 180 tablet, Refills: 1    Associated Diagnoses: Lupus (systemic lupus erythematosus)      levothyroxine (SYNTHROID) 75 MCG tablet Take 1 tablet (75 mcg total) by mouth before breakfast.  Qty: 90 tablet, Refills: 0    Associated Diagnoses: Hypothyroidism, unspecified type      rosuvastatin (CRESTOR) 40 MG Tab Take 1 tablet (40 mg total) by mouth every  evening.  Qty: 90 tablet, Refills: 3      acetaminophen (TYLENOL) 500 MG tablet Take 500 mg by mouth every 6 (six) hours as needed for Pain.      ammonium lactate 12 % Crea Apply 1 application topically 2 (two) times daily as needed.  Qty: 385 g, Refills: 2    Associated Diagnoses: Dermatitis      betamethasone dipropionate (DIPROLENE) 0.05 % cream Apply topically 2 (two) times daily as needed.  Qty: 45 g, Refills: 2    Associated Diagnoses: Rash      butalbital-aspirin-caffeine -40 mg (FIORINAL) -40 mg Cap Take 1 capsule by mouth every 6 (six) hours as needed.       cholestyramine (QUESTRAN) 4 gram packet Take 1 packet (4 g total) by mouth 3 (three) times daily with meals.  Qty: 90 packet, Refills: 4      conjugated estrogens (PREMARIN) vaginal cream Use 1g nightly for two weeks, then 1g twice per week.  Qty: 30 g, Refills: 12    Associated Diagnoses: Vaginal atrophy      fexofenadine (ALLEGRA) 180 MG tablet Take 1 tablet (180 mg total) by mouth once daily.  Qty: 30 tablet, Refills: 2    Associated Diagnoses: Dizziness      hyoscyamine (LEVSIN/SL) 0.125 mg Subl Place 1 tablet (0.125 mg total) under the tongue 2 (two) times daily as needed.  Qty: 60 tablet, Refills: 1    Associated Diagnoses: Abdominal cramps      ipratropium (ATROVENT) 0.03 % nasal spray 2 sprays by Nasal route 2 (two) times daily as needed for Rhinitis.  Qty: 30 mL, Refills: 0    Associated Diagnoses: Allergic rhinitis with postnasal drip      latanoprost 0.005 % ophthalmic solution INSTILL 1 DROP INTO BOTH EYES EVERY DAY  Qty: 3 Bottle, Refills: 3      meclizine (ANTIVERT) 12.5 mg tablet Take 1 tablet (12.5 mg total) by mouth 3 (three) times daily as needed.  Qty: 30 tablet, Refills: 1    Associated Diagnoses: Dizziness      multivitamin capsule Take 1 capsule by mouth once daily.      nitroGLYCERIN (NITROSTAT) 0.4 MG SL tablet PLACE 1 TABLET UNDER THE TONGUE EVERY 5 MINUTES FOR 3 DOSES --IF NOT RELIEVED GO TO THE ER  Qty: 25 tablet,  Refills: 6      nystatin (MYCOSTATIN) ointment Apply topically 2 (two) times daily.  Qty: 30 g, Refills: 2    Associated Diagnoses: Yeast infection      nystatin-triamcinolone (MYCOLOG II) cream Apply topically 4 (four) times daily.  Qty: 60 g, Refills: 0    Associated Diagnoses: Rash, skin      omeprazole (PRILOSEC) 40 MG capsule Take 1 capsule (40 mg total) by mouth once daily.  Qty: 90 capsule, Refills: 3      TENS units Sravanthi 1 application by Misc.(Non-Drug; Combo Route) route daily as needed (pain).  Qty: 1 Device, Refills: 0    Associated Diagnoses: Osteoarthritis of cervical spine      triamcinolone acetonide 0.1% (KENALOG) 0.1 % paste PLACE ONTO TEETH TWICE DAILY  Qty: 5 g, Refills: 0      VITAMIN D2 50,000 unit capsule TAKE 1 CAPSULE (50,000 UNITS TOTAL) EVERY 30 DAYS  Qty: 3 capsule, Refills: 0    Associated Diagnoses: Vitamin D deficiency         STOP taking these medications       carvedilol (COREG) 25 MG tablet Comments:   Reason for Stopping:         furosemide (LASIX) 20 MG tablet Comments:   Reason for Stopping:         hydrochlorothiazide (HYDRODIURIL) 25 MG tablet Comments:   Reason for Stopping:         mometasone (ELOCON) 0.1 % ointment Comments:   Reason for Stopping:               Indwelling Lines/Drains at time of discharge:   Lines/Drains/Airways          No matching active lines, drains, or airways          Time spent on the discharge of patient: 30 minutes  Patient was seen and examined on the date of discharge and determined to be suitable for discharge.         Francisco Jaeger MD  Department of Hospital Medicine  [facility]

## 2017-11-19 NOTE — SIGNIFICANT EVENT
Interventional    I discussed the case with Dr Wyman who is this patient's Cardiologist. She is not sure a coronary angiogram at this time is indicated. She wants to try medical management for patient's decompensated heart failure and reassess as outpatient in light of her renal isufficiency. Patient has an appointment with my partner, Dr Ly next week.    I advised her to keep that appointment and discuss treatment options then. She has moderate AS and presented with acute on chronic heart failure, her EF is preserved (prior to this admission), she has known CAD with prior PCI. No evidence on ACS.    Will sign out for now    Gerardo Isabel MD  Interventional Cardiology  Structural/Valvular heart disease  [phone]

## 2017-11-19 NOTE — PROGRESS NOTES
Pt Mg 1.6, Hg 8.1 this morning.  Notified Dr. Orellana.  No orders received at this time. Will continue to monitor.

## 2017-11-19 NOTE — PLAN OF CARE
Problem: Patient Care Overview  Goal: Plan of Care Review  Outcome: Ongoing (interventions implemented as appropriate)  Pt free of falls and injury during the shift; VSS and NAD. Pt diuresing well with lasix; weight trending down. POC reviewed and questions answered; pt tolerating plan of care.

## 2017-11-21 ENCOUNTER — PATIENT MESSAGE (OUTPATIENT)
Dept: CARDIOLOGY | Facility: CLINIC | Age: 72
End: 2017-11-21

## 2017-11-21 ENCOUNTER — LAB VISIT (OUTPATIENT)
Dept: LAB | Facility: HOSPITAL | Age: 72
End: 2017-11-21
Attending: INTERNAL MEDICINE
Payer: MEDICARE

## 2017-11-21 ENCOUNTER — PATIENT OUTREACH (OUTPATIENT)
Dept: ADMINISTRATIVE | Facility: CLINIC | Age: 72
End: 2017-11-21

## 2017-11-21 DIAGNOSIS — N18.30 CKD (CHRONIC KIDNEY DISEASE) STAGE 3, GFR 30-59 ML/MIN: Chronic | ICD-10-CM

## 2017-11-21 DIAGNOSIS — M85.80 OSTEOPENIA: Chronic | ICD-10-CM

## 2017-11-21 DIAGNOSIS — M32.9 LUPUS (SYSTEMIC LUPUS ERYTHEMATOSUS): Chronic | ICD-10-CM

## 2017-11-21 LAB
25(OH)D3+25(OH)D2 SERPL-MCNC: 29 NG/ML
ALBUMIN SERPL BCP-MCNC: 2.9 G/DL
ALP SERPL-CCNC: 74 U/L
ALT SERPL W/O P-5'-P-CCNC: 15 U/L
ANION GAP SERPL CALC-SCNC: 11 MMOL/L
AST SERPL-CCNC: 44 U/L
BASOPHILS # BLD AUTO: 0.03 K/UL
BASOPHILS NFR BLD: 0.8 %
BILIRUB SERPL-MCNC: 0.2 MG/DL
BUN SERPL-MCNC: 39 MG/DL
C3 SERPL-MCNC: 125 MG/DL
C4 SERPL-MCNC: 25 MG/DL
CALCIUM SERPL-MCNC: 8.8 MG/DL
CHLORIDE SERPL-SCNC: 109 MMOL/L
CO2 SERPL-SCNC: 21 MMOL/L
CREAT SERPL-MCNC: 1.8 MG/DL
CRP SERPL-MCNC: 0.5 MG/L
DIFFERENTIAL METHOD: ABNORMAL
EOSINOPHIL # BLD AUTO: 0.1 K/UL
EOSINOPHIL NFR BLD: 2.9 %
ERYTHROCYTE [DISTWIDTH] IN BLOOD BY AUTOMATED COUNT: 14.8 %
ERYTHROCYTE [SEDIMENTATION RATE] IN BLOOD BY WESTERGREN METHOD: 24 MM/HR
EST. GFR  (AFRICAN AMERICAN): 32.2 ML/MIN/1.73 M^2
EST. GFR  (NON AFRICAN AMERICAN): 27.9 ML/MIN/1.73 M^2
GLUCOSE SERPL-MCNC: 94 MG/DL
HCT VFR BLD AUTO: 28.6 %
HGB BLD-MCNC: 8.9 G/DL
IMM GRANULOCYTES # BLD AUTO: 0.02 K/UL
IMM GRANULOCYTES NFR BLD AUTO: 0.5 %
LYMPHOCYTES # BLD AUTO: 1 K/UL
LYMPHOCYTES NFR BLD: 27.2 %
MCH RBC QN AUTO: 25.1 PG
MCHC RBC AUTO-ENTMCNC: 31.1 G/DL
MCV RBC AUTO: 81 FL
MONOCYTES # BLD AUTO: 0.6 K/UL
MONOCYTES NFR BLD: 14.4 %
NEUTROPHILS # BLD AUTO: 2.1 K/UL
NEUTROPHILS NFR BLD: 54.2 %
NRBC BLD-RTO: 0 /100 WBC
PLATELET # BLD AUTO: 212 K/UL
PMV BLD AUTO: 10.7 FL
POTASSIUM SERPL-SCNC: 3.7 MMOL/L
PROT SERPL-MCNC: 6.9 G/DL
RBC # BLD AUTO: 3.55 M/UL
SODIUM SERPL-SCNC: 141 MMOL/L
WBC # BLD AUTO: 3.83 K/UL

## 2017-11-21 PROCEDURE — 80053 COMPREHEN METABOLIC PANEL: CPT

## 2017-11-21 PROCEDURE — 86225 DNA ANTIBODY NATIVE: CPT

## 2017-11-21 PROCEDURE — 86160 COMPLEMENT ANTIGEN: CPT

## 2017-11-21 PROCEDURE — 86140 C-REACTIVE PROTEIN: CPT

## 2017-11-21 PROCEDURE — 85025 COMPLETE CBC W/AUTO DIFF WBC: CPT

## 2017-11-21 PROCEDURE — 82306 VITAMIN D 25 HYDROXY: CPT

## 2017-11-21 PROCEDURE — 36415 COLL VENOUS BLD VENIPUNCTURE: CPT | Mod: PO

## 2017-11-21 PROCEDURE — 86160 COMPLEMENT ANTIGEN: CPT | Mod: 59

## 2017-11-21 PROCEDURE — 85651 RBC SED RATE NONAUTOMATED: CPT

## 2017-11-21 NOTE — PHYSICIAN QUERY
PT Name: Ewelina Arnold  MR #: [id number]    Physician Query Form -Present on Admission (POA) Diagnosis Clarification     CDS/: Erika Dominguez               Contact information:  [email or web address]    This form is a permanent document in the medical record.     Query Date: November 21, 2017    By submitting this query, we are merely seeking further clarification of documentation. Please utilize your independent clinical judgment when addressing the question(s) below.       The Medical record contains the following:    Diagnosis      Supporting Clinical Information   Location in Medical Record   Acute on chronic kidney failure             Acute on chronic kidney failure    Creatinine at baseline. She has single kidney s/p donation. Will have to use low contrast technique during coronary evaluation if deemed necessary     Acute on chronic kidney failure POA No    Bun,Creat=40,1.9  Bun,Creat=39,1.9 H&P    Interventional Cardiology   Consult 11-18                Discharge summary      Lab 11-17  Lab 11-18         Doctor, Please specify Present On Admission (POA) status of Acute on chronic kidney failure.    [  ] Present on Admission   [x  ] Not Present on Admission, CKD  [  ] Clinically undetermined

## 2017-11-21 NOTE — PHYSICIAN QUERY
PT Name: Ewelina Arnold  MR #: [id number]  Physician Query Form - CKD Clarification     CDS/: Erika Dominguez               Contact information:[email or web address]  This form is a permanent document in the medical record.     Query Date: November 21, 2017    By submitting this query, we are merely seeking further clarification of documentation. Please utilize your independent clinical judgment when addressing the question(s) below.    The Medical record contains the following:     Indicators   Supporting Clinical Findings   Location in Medical Record   x CKD or Chronic Kidney (Renal) Failure / Disease Acute on chronic kidney failure  H&P   x BUN/Creatinine                          GFR Bun=40  Creat=1.9  GFR=30.1   Lab 11-17    Dehydration      Nausea / Vomiting      Dialysis / CRRT     x Medication Amlodipine  Carvediol Mar 11-18    Treatment      Other Chronic Conditions      Other       Provider, please further specify the stage of CKD.      [x  ] Chronic Kidney Disease (CKD) (please specify stage* below)       [employer] Definitions  Stage Description  eGFR (mL/min)   [  ]     I Slight kidney damage with normal or increased filtration 90+   [  ]     II Mildly reduced kidney function 60-89   [ x ]     III Moderately reduced kidney function 30-59     [  ] Other (please specify): ________________________  [  ] Clinically Undetermined    Please document in your progress notes daily for the duration of treatment until resolved and include in your discharge summary.

## 2017-11-21 NOTE — PATIENT INSTRUCTIONS
Discharge Instructions for Aortic Valve Stenosis  You have been diagnosed with aortic valve stenosis. This means the aortic valve in your heart is stiff or remains in a near-closed position and has trouble opening. Because of this, your heart has to work harder to push the blood through the valve. In some cases, this extra work makes the muscle of the heart thicken. The extra work can tire the heart and cause its muscle to weaken over time. This condition often changes very slowly and doesn't need to be treated. But in some people, it may get worse faster and need to be treated. Some people can control their stenosis with medicines. In some severe cases, surgery is needed.  Home care  · Check with your doctor before taking any over-the-counter medicines, herbal products, or vitamins.  · Take your medicines exactly as directed. Dont skip doses.  · Keep all follow-up appointments. Some people with aortic stenosis dont have symptoms. Others need close follow-up and surgery.  Lifestyle changes  · Maintain a healthy weight. Get help to lose any extra pounds.  · Ask your doctor if an exercise program is right for you. Some people with aortic stenosis need to be very careful about exercise as it can result in fainting.  · Break the smoking habit. Enroll in a stop-smoking program to improve your chances of success.  Follow-up care  Make a follow-up appointment with your healthcare provider, or as directed.  When to call 911  Call 911 or go to the emergency room right away if you have any of the following:  · Chest pain or shortness of breath  · Weakness in the muscles of your face, arms, or legs  · Trouble speaking  · Rapid pulse or pounding heartbeat  · Fainting or dizziness  · Sudden vertigo   Date Last Reviewed: 6/1/2016  © 5685-6801 [employer]. [address] [place] [address], [place] [address]. All rights reserved. This information is not intended as a substitute for professional medical care. Always follow  your healthcare professional's instructions.        Left- or Right- Side Congestive Heart Failure (CHF)    The heart is a large muscle. It is a pump that circulates blood throughout the body. Blood carries oxygen to all of the organs, including the brain, muscles, and skin. After your body takes the oxygen out of the blood, the blood returns to the heart. The right side of the heart collects the blood from the body and pumps it to the lungs. In the lungs, it gets fresh oxygen and gives up carbon dioxide. The oxygen-rich blood from the lungs then returns to the left side of the heart, where it is pumped back out to the rest of your body, starting the process all over.  Congestive heart failure (CHF) occurs when the heart muscle is weakened. This affects the pumping action of the heart. Heart failure can affect the right side of the heart or the left side. But heart failure may affect not only the right side of the heart or only the left side. Although it may have started on one side, it can and often eventually does affect both sides.  Right-side heart failure  When the right side of the heart is weakened, it cant handle the blood it is getting from the rest of the body. This blood returns to the heart through veins. When too much pressure builds up in the veins, fluid leaks out into the tissues. Gravity then causes that fluid to move to those parts of the body that are the lowest. So one of the first symptoms of right-side CHF can include swelling in the feet and ankles. If the condition gets worse, the swelling can even go up past the knees. Sometimes it gets so severe, the liver can get congested as well.  Left-side heart failure  When the left side of the heart is weakened, it cant handle the blood it gets from the lungs. Pressure then builds up in the veins of the lungs, causing fluid to leak into the lung tissues. This may cause CHF and pulmonary edema. This causes you to feel short of breath, weak, or dizzy.  These symptoms are often worse with exertion, such as when climbing stairs or walking up hills. Lying with your head flat is uncomfortable and can make your breathing worse. This may make sleeping difficult. You may need to use extra pillows to elevate your upper body to sleep well. The same is true when just resting during the daytime.  There are many causes of heart failure including:  · Coronary artery disease  · Past heart attack (also known as acute myocardial infarction, or AMI)  · High blood pressure  · Damaged heart valve  · Diabetes  · Obesity  · Cigarette smoking  · Alcohol abuse  Heart failure is a chronic condition. There is no cure. The purpose of medical treatment is to improve the pumping action of the heart. The main way to do this is to remove excess water from the body. A number of medicines can help reach this goal, improve symptoms, and prevent the heart from becoming weaker. Sometimes, heart failure can become so severe that a device is placed in the heart to help with pumping. Another major goal is to better treat the causes of heart failure, such as diabetes and high blood pressure, by making changes in your lifestyle and maximizing medical control when needed.  Home care  Follow these guidelines when caring for yourself at home:  · Check your weight every day. This is very important because a sudden increase in weight gain could mean worsening heart failure. Keep these things in mind:  ¨ Use the same scale every day.  ¨ Weigh yourself at the same time every day.  ¨ Make sure the scale is on a hard floor surface, not on a rug or carpet.  ¨ Keep a record of your weight every day so your healthcare provider can see it. If you are not given a log sheet for this, keep a separate journal for this purpose.   · Cut back on the amount of salt (sodium) you eat. Follow your healthcare provider's recommendation on how much salt or sodium you should have each day.  ¨ Avoid high-salt foods. These include  olives, pickles, smoked meats, salted potato chips, and most prepared foods.  ¨ Don't add salt to your food at the table. Use only small amounts of salt when cooking.  ¨ Read the labels carefully on food packages to learn how much salt or sodium is in each serving in the package. Remember, a can or package of food may contain more than 1 serving. So if you eat all the food in the package, you may be getting more salt than you think.  · Follow your healthcare provider's recommendations about how much fluid you should have. Be aware that some foods, such as soup, pudding, and juicy fruits like oranges or melons, contain liquid. You'll need to count the liquid in those foods as part of your daily fluid intake. Your provider can help you with this.  · Stop smoking.  · Cut back on how much alcohol you drink.  · Lose weight if you are overweight. The excess weight adds a lot of stress on the workload of the heart.  · Stay active. Talk with your provider about an exercise program that is safe for your heart.  · Keep your feet elevated to reduce swelling. Ask your provider about support hose as a preventive treatment for daytime leg swelling.  Besides taking your medicine as instructed, an important part of treatment is lifestyle changes. These include diet, physical activity, stopping smoking, and weight control.  Improve your diet by including more fresh foods, cutting back on how much sugar and saturated fat you eat, and eating fewer processed foods and less salt.  Follow-up care  Follow up with your healthcare provider, or as advised.  Make sure to keep any appointments that were made for you. These can help better control your congestive heart failure. You will need to follow up with your provider on a routine basis to make sure your heart failure is well managed.  If an X-ray, ECG, or other tests were done, you will be told of any new findings that may affect your care.  Call 911  Call 911 if you:  · Become severely  short of breath  · Feel lightheaded, or feel like you might pass out or faint  · Have chest pain or discomfort that is different than usual, the medicines your doctor told you to use for this don't help, or the pain lasts longer than 10 to 15 minutes  · You suddenly develop a rapid heart rate  When to seek medical advice  The following may be signs that your heart failure is getting worse. Call your healthcare provider right away if any of these happen:  · Sudden weight gain. This means 3 or more pounds in one day, or 5 or more pounds in 1 week.  · Trouble breathing not related to being active  · New or increased swelling of your legs or ankles  · Swelling or pain in your abdomen  · Breathing trouble at night. This means waking up short of breath or needing more pillows to breathe.  · Frequent coughing that doesnt go away  · Feeling much more tired than usual  Date Last Reviewed: 1/4/2016  © 9194-9088 [employer]. [address] [place] [address], [place] [address]. All rights reserved. This information is not intended as a substitute for professional medical care. Always follow your healthcare professional's instructions.

## 2017-11-21 NOTE — PROGRESS NOTES
Pt was asking if she had any fluid restrictions. According to d\c summary there are none. She said that her cardiologist hadn't given her any in the past, but would like to verify. Message routed to Dr. Wyman's office to call pt with info.

## 2017-11-22 ENCOUNTER — OFFICE VISIT (OUTPATIENT)
Dept: CARDIOLOGY | Facility: CLINIC | Age: 72
End: 2017-11-22
Payer: MEDICARE

## 2017-11-22 ENCOUNTER — PATIENT MESSAGE (OUTPATIENT)
Dept: RHEUMATOLOGY | Facility: CLINIC | Age: 72
End: 2017-11-22

## 2017-11-22 VITALS
WEIGHT: 134.94 LBS | BODY MASS INDEX: 22.45 KG/M2 | OXYGEN SATURATION: 97 % | HEART RATE: 64 BPM | SYSTOLIC BLOOD PRESSURE: 172 MMHG | DIASTOLIC BLOOD PRESSURE: 74 MMHG

## 2017-11-22 DIAGNOSIS — N18.30 CKD (CHRONIC KIDNEY DISEASE) STAGE 3, GFR 30-59 ML/MIN: ICD-10-CM

## 2017-11-22 DIAGNOSIS — I10 ESSENTIAL HYPERTENSION: ICD-10-CM

## 2017-11-22 DIAGNOSIS — I27.20 PULMONARY HYPERTENSION: ICD-10-CM

## 2017-11-22 DIAGNOSIS — R06.02 SOB (SHORTNESS OF BREATH): ICD-10-CM

## 2017-11-22 DIAGNOSIS — I50.33 ACUTE ON CHRONIC DIASTOLIC CONGESTIVE HEART FAILURE: Primary | ICD-10-CM

## 2017-11-22 DIAGNOSIS — I35.0 NONRHEUMATIC AORTIC VALVE STENOSIS: ICD-10-CM

## 2017-11-22 DIAGNOSIS — R80.8 OTHER PROTEINURIA: Primary | ICD-10-CM

## 2017-11-22 DIAGNOSIS — I25.10 CORONARY ARTERY DISEASE DUE TO LIPID RICH PLAQUE: ICD-10-CM

## 2017-11-22 DIAGNOSIS — I77.9 LEFT-SIDED CAROTID ARTERY DISEASE: ICD-10-CM

## 2017-11-22 DIAGNOSIS — I25.83 CORONARY ARTERY DISEASE DUE TO LIPID RICH PLAQUE: ICD-10-CM

## 2017-11-22 DIAGNOSIS — R06.09 DOE (DYSPNEA ON EXERTION): ICD-10-CM

## 2017-11-22 LAB — DSDNA AB SER-ACNC: NORMAL [IU]/ML

## 2017-11-22 PROCEDURE — 99214 OFFICE O/P EST MOD 30 MIN: CPT | Mod: S$GLB,,, | Performed by: INTERNAL MEDICINE

## 2017-11-22 PROCEDURE — 99499 UNLISTED E&M SERVICE: CPT | Mod: S$GLB,,, | Performed by: INTERNAL MEDICINE

## 2017-11-22 PROCEDURE — 99999 PR PBB SHADOW E&M-EST. PATIENT-LVL III: CPT | Mod: PBBFAC,,, | Performed by: INTERNAL MEDICINE

## 2017-11-22 RX ORDER — TORSEMIDE 20 MG/1
20 TABLET ORAL DAILY
Qty: 30 TABLET | Refills: 3 | Status: ON HOLD | OUTPATIENT
Start: 2017-11-22 | End: 2017-11-30

## 2017-11-22 RX ORDER — BUMETANIDE 1 MG/1
1 TABLET ORAL 2 TIMES DAILY
Qty: 90 TABLET | Refills: 3 | Status: SHIPPED | OUTPATIENT
Start: 2017-11-22 | End: 2017-11-22

## 2017-11-22 RX ORDER — METOPROLOL SUCCINATE 50 MG/1
50 TABLET, EXTENDED RELEASE ORAL DAILY
Qty: 90 TABLET | Refills: 3 | Status: ON HOLD | OUTPATIENT
Start: 2017-11-22 | End: 2017-11-30 | Stop reason: HOSPADM

## 2017-11-22 NOTE — PROGRESS NOTES
Subjective:   Patient ID:  Ewelina Arnold is a 71 y.o. female who presents for follow-up of Edema and Shortness of Breath  Ewelina Arnold is a 71 y.o. female who presents for follow-up of Shortness of Breath (x 2 months fu) and Edema  71 year old female with hx of HTN, CAD s/p PCI to LAD 2011, HLD, history partial colectomy for polyps and colon CA(cancer free now), single kidney s/p kidney donation 1985, SLE. Last non-invasive assessment was 3/2016 by exercise echo with no evidence of WMA and normal EF. Last TTE 5/2017 with mod-severe AS (peak velocity 3.13 ms, PABLITO 1.0 cm2, peak gradient 39).  Today she presents with a 1 month history of increased PEDRO and SOB. States she cannot even handle daily activities at this point. Sometimes has to unhook her bra to breath better.  Uses 1 pillow to sleep, denies PND or orthopnea, denies palpitations. Denies any weight gain and checks weight daily. Notes some leg cramping after walking up 7 steps but this does not occur when she walks normally and gets short of breath.     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 Well into 170 and 180s whenever she check, continues to have Moderate PEDRO.   C/o leg swelling on and off worse in the evenings. Up to the mid shin.      Echo 2017    1 - Normal left ventricular systolic function (EF 60-65%). Concentric remodeling.     2 - Impaired LV relaxation, elevated LAP (grade 2 diastolic dysfunction).     3 - Normal right ventricular systolic function .     4 - Pulmonary hypertension. The estimated PA systolic pressure is 58 mmHg.     5 - Moderate to severe aortic stenosis, PABLITO = 0.95 cm2, peak velocity = 3.76 m/s, mean gradient = 28 mmHg.     6 - Trivial to mild aortic regurgitation.     7 - Trivial to mild mitral regurgitation.     8 - Trivial pericardial effusion.     9 - Increased central venous pressure.     10 - Severe left atrial enlargement.     11 - No wall motion  abnormalities.      Echo reviewed by 2 other echocardiographers that agrees that is this is likely moderate AS with nomral SV and PABLITO is likely small due to small perhaps erroneous measurement of LVOT that is common in calcified LVOT.     HPI:   Recent hospitalization with new onset heart failure.Diuresed > 1 litre with IV lasix, still significant leg edema, weight gain and SOB on moderate exertion.      Patient Active Problem List   Diagnosis    Lupus (systemic lupus erythematosus)    CAD (coronary artery disease)    Post PTCA    Hypertension    High risk medication use - Both Eyes    Residual stage angle-closure glaucoma - Both Eyes    Edema    Nuclear sclerosis - Both Eyes    Thyroid nodule    Postsurgical hypothyroidism    Osteopenia    Insufficiency of tear film of both eyes    Aortic atherosclerosis    Left ventricular diastolic dysfunction with preserved systolic function    GERD (gastroesophageal reflux disease)    History of colon cancer    Hyperlipidemia    Degeneration of lumbar or lumbosacral intervertebral disc    Hypertensive kidney disease with chronic kidney disease stage III    PEDRO (dyspnea on exertion)    Tortuous aorta    Positive dysphotopsia    Weight loss, abnormal    Pulmonary heart disease    Secondary hyperparathyroidism of renal origin    Peripheral arterial disease    Major depressive disorder with single episode, in full remission    Cervical spine arthritis    Malnutrition    Bilateral carotid artery disease    Stage 3 chronic kidney disease    Anemia of other chronic disease    Anemia associated with chronic renal failure    Aortic stenosis    Screen for colon cancer    Head trauma    Anemia    Acute on chronic diastolic congestive heart failure    Acute on chronic kidney failure    Elevated troponin    Aortic stenosis, moderate     BP (!) 172/74   Pulse 64   Wt 61.2 kg (134 lb 14.7 oz)   LMP  (LMP Unknown)   SpO2 97%   BMI 22.45 kg/m²    Body mass index is 22.45 kg/m².  CrCl cannot be calculated (Unknown ideal weight.).    Lab Results   Component Value Date     11/21/2017    K 3.7 11/21/2017     11/21/2017    CO2 21 (L) 11/21/2017    BUN 39 (H) 11/21/2017    CREATININE 1.8 (H) 11/21/2017    GLU 94 11/21/2017    HGBA1C 5.7 01/24/2017    MG 1.6 11/19/2017    AST 44 (H) 11/21/2017    ALT 15 11/21/2017    ALBUMIN 2.9 (L) 11/21/2017    PROT 6.9 11/21/2017    BILITOT 0.2 11/21/2017    WBC 3.83 (L) 11/21/2017    HGB 8.9 (L) 11/21/2017    HCT 28.6 (L) 11/21/2017    MCV 81 (L) 11/21/2017     11/21/2017    INR 1.1 05/09/2013    TSH 1.027 09/19/2017    CHOL 123 03/23/2017    HDL 39 (L) 03/23/2017    LDLCALC 68.2 03/23/2017    TRIG 79 03/23/2017       Current Outpatient Prescriptions   Medication Sig    acetaminophen (TYLENOL) 500 MG tablet Take 500 mg by mouth every 6 (six) hours as needed for Pain.    amLODIPine (NORVASC) 10 MG tablet Take 1 tablet (10 mg total) by mouth once daily.    ammonium lactate 12 % Crea Apply 1 application topically 2 (two) times daily as needed.    aspirin (ECOTRIN) 81 MG EC tablet Take 1 tablet (81 mg total) by mouth once daily. (Patient taking differently: Take 81 mg by mouth once daily. 1 tablet every other day)    betamethasone dipropionate (DIPROLENE) 0.05 % cream Apply topically 2 (two) times daily as needed.    butalbital-aspirin-caffeine -40 mg (FIORINAL) -40 mg Cap Take 1 capsule by mouth every 6 (six) hours as needed.     cholestyramine (QUESTRAN) 4 gram packet Take 1 packet (4 g total) by mouth 3 (three) times daily with meals.    citalopram (CELEXA) 20 MG tablet Take 1 tablet (20 mg total) by mouth once daily.    conjugated estrogens (PREMARIN) vaginal cream Use 1g nightly for two weeks, then 1g twice per week.    fexofenadine (ALLEGRA) 180 MG tablet Take 1 tablet (180 mg total) by mouth once daily. (Patient taking differently: Take 180 mg by mouth as needed. )    hydrALAZINE  (APRESOLINE) 10 MG tablet TAKE 2 TABLETS EVERY 12 HOURS    hydroxychloroquine (PLAQUENIL) 200 mg tablet TAKE 1 TABLET TWICE DAILY    hyoscyamine (LEVSIN/SL) 0.125 mg Subl Place 1 tablet (0.125 mg total) under the tongue 2 (two) times daily as needed.    ipratropium (ATROVENT) 0.03 % nasal spray 2 sprays by Nasal route 2 (two) times daily as needed for Rhinitis.    latanoprost 0.005 % ophthalmic solution INSTILL 1 DROP INTO BOTH EYES EVERY DAY    levothyroxine (SYNTHROID) 75 MCG tablet Take 1 tablet (75 mcg total) by mouth before breakfast.    meclizine (ANTIVERT) 12.5 mg tablet Take 1 tablet (12.5 mg total) by mouth 3 (three) times daily as needed.    metoprolol succinate (TOPROL-XL) 50 MG 24 hr tablet Take 1 tablet (50 mg total) by mouth once daily.    multivitamin capsule Take 1 capsule by mouth once daily.    nitroGLYCERIN (NITROSTAT) 0.4 MG SL tablet PLACE 1 TABLET UNDER THE TONGUE EVERY 5 MINUTES FOR 3 DOSES --IF NOT RELIEVED GO TO THE ER    nystatin (MYCOSTATIN) ointment Apply topically 2 (two) times daily.    nystatin-triamcinolone (MYCOLOG II) cream Apply topically 4 (four) times daily.    omeprazole (PRILOSEC) 40 MG capsule Take 1 capsule (40 mg total) by mouth once daily.    rosuvastatin (CRESTOR) 40 MG Tab Take 1 tablet (40 mg total) by mouth every evening.    TENS units Sravanthi 1 application by Misc.(Non-Drug; Combo Route) route daily as needed (pain).    triamcinolone acetonide 0.1% (KENALOG) 0.1 % paste PLACE ONTO TEETH TWICE DAILY (Patient taking differently: PLACE ONTO TEETH PRN)    VITAMIN D2 50,000 unit capsule TAKE 1 CAPSULE (50,000 UNITS TOTAL) EVERY 30 DAYS    torsemide (DEMADEX) 20 MG Tab Take 1 tablet (20 mg total) by mouth once daily.     No current facility-administered medications for this visit.        ROS    Objective:   Physical Exam   Constitutional: She is oriented to person, place, and time. She appears well-developed and well-nourished. No distress.   Patient appears  lethargic   HENT:   Head: Normocephalic and atraumatic.   Nose: Nose normal.   Mouth/Throat: Oropharynx is clear and moist. No oropharyngeal exudate.   Eyes: Conjunctivae and EOM are normal. Pupils are equal, round, and reactive to light. Right eye exhibits no discharge. Left eye exhibits no discharge. No scleral icterus.   Neck: Normal range of motion. Neck supple. No JVD present. No tracheal deviation present. No thyromegaly present.   Cardiovascular: Normal rate, regular rhythm, normal heart sounds and intact distal pulses.  Exam reveals no gallop and no friction rub.    No murmur heard.  Pulmonary/Chest: Effort normal and breath sounds normal. No stridor. No respiratory distress. She has no wheezes. She has no rales. She exhibits no tenderness.   Abdominal: Soft. Bowel sounds are normal. She exhibits no distension and no mass. There is no tenderness. There is no rebound and no guarding.   Musculoskeletal: Normal range of motion. She exhibits edema (3+ up to the shin). She exhibits no tenderness.   Lymphadenopathy:     She has no cervical adenopathy.   Neurological: She is alert and oriented to person, place, and time. She has normal reflexes. No cranial nerve deficit. She exhibits normal muscle tone. Coordination normal.   Skin: Skin is warm. No rash noted. She is not diaphoretic. No erythema. No pallor.   Psychiatric: She has a normal mood and affect. Her behavior is normal. Judgment and thought content normal.       Assessment:     1. Acute on chronic diastolic congestive heart failure    2. SOB (shortness of breath)    3. Nonrheumatic aortic valve stenosis    4. CKD (chronic kidney disease) stage 3, GFR 30-59 ml/min    5. Essential hypertension    6. PEDRO (dyspnea on exertion)    7. Coronary artery disease due to lipid rich plaque    8. Left-sided carotid artery disease    9. Pulmonary hypertension        Plan:   Ewelina was seen today for edema and shortness of breath.    Diagnoses and all orders for this  visit:    Acute on chronic diastolic congestive heart failure    SOB (shortness of breath)  -     Cardiac PET Scan Stress; Future  -     2D Echo w/ Color Flow Doppler; Future    Nonrheumatic aortic valve stenosis    CKD (chronic kidney disease) stage 3, GFR 30-59 ml/min    Essential hypertension    PEDRO (dyspnea on exertion)    Coronary artery disease due to lipid rich plaque    Left-sided carotid artery disease    Pulmonary hypertension    Other orders  -     Discontinue: metoprolol succinate (TOPROL-XL) 50 MG 24 hr tablet; Take 1 tablet (50 mg total) by mouth once daily.  -     Discontinue: bumetanide (BUMEX) 1 MG tablet; Take 1 tablet (1 mg total) by mouth 2 (two) times daily.  -     Discontinue: torsemide (DEMADEX) 20 MG Tab; Take 1 tablet (20 mg total) by mouth once daily.      Stop Bumex start Torsemide  Increase hydralazine to 3(10 mg) tabs in am and 2 tabs in PM.   Will repeat Echo and PET stress to r/o CAD.   Exercise as tolerated.   Counseled on importance of heart healthy diet low in saturated and trans fat and salt as well gradually starting a regular aerobic exercise regimen with goal of 30min 5x/week. Recommend BP diary. Call if systolic BP > 140 mmHg on checking repeatedly  All meds reviewed and are appropriate  Patient is compliant with her medications.    RTC 1 WK

## 2017-11-26 ENCOUNTER — PATIENT MESSAGE (OUTPATIENT)
Dept: CARDIOLOGY | Facility: CLINIC | Age: 72
End: 2017-11-26

## 2017-11-27 ENCOUNTER — OFFICE VISIT (OUTPATIENT)
Dept: RHEUMATOLOGY | Facility: CLINIC | Age: 72
DRG: 291 | End: 2017-11-27
Payer: MEDICARE

## 2017-11-27 ENCOUNTER — HOSPITAL ENCOUNTER (INPATIENT)
Facility: HOSPITAL | Age: 72
LOS: 3 days | Discharge: HOME | DRG: 291 | End: 2017-11-30
Attending: HOSPITALIST | Admitting: HOSPITALIST
Payer: MEDICARE

## 2017-11-27 ENCOUNTER — HOSPITAL ENCOUNTER (OUTPATIENT)
Dept: CARDIOLOGY | Facility: CLINIC | Age: 72
Discharge: HOME | DRG: 291 | End: 2017-11-27
Attending: INTERNAL MEDICINE
Payer: MEDICARE

## 2017-11-27 ENCOUNTER — INITIAL CONSULT (OUTPATIENT)
Dept: CARDIOLOGY | Facility: CLINIC | Age: 72
DRG: 291 | End: 2017-11-27
Payer: MEDICARE

## 2017-11-27 ENCOUNTER — EDUCATION (OUTPATIENT)
Dept: CARDIOLOGY | Facility: CLINIC | Age: 72
End: 2017-11-27

## 2017-11-27 VITALS
WEIGHT: 131.19 LBS | SYSTOLIC BLOOD PRESSURE: 163 MMHG | HEIGHT: 65 IN | HEART RATE: 66 BPM | DIASTOLIC BLOOD PRESSURE: 70 MMHG | OXYGEN SATURATION: 96 % | BODY MASS INDEX: 21.86 KG/M2

## 2017-11-27 VITALS
WEIGHT: 134.81 LBS | HEART RATE: 66 BPM | BODY MASS INDEX: 23.89 KG/M2 | SYSTOLIC BLOOD PRESSURE: 154 MMHG | HEIGHT: 63 IN | DIASTOLIC BLOOD PRESSURE: 69 MMHG

## 2017-11-27 DIAGNOSIS — M32.8 OTHER FORMS OF SYSTEMIC LUPUS ERYTHEMATOSUS, UNSPECIFIED ORGAN INVOLVEMENT STATUS: Primary | Chronic | ICD-10-CM

## 2017-11-27 DIAGNOSIS — N18.30 STAGE 3 CHRONIC KIDNEY DISEASE: ICD-10-CM

## 2017-11-27 DIAGNOSIS — I77.9 BILATERAL CAROTID ARTERY DISEASE: ICD-10-CM

## 2017-11-27 DIAGNOSIS — I50.9 CHF (CONGESTIVE HEART FAILURE): ICD-10-CM

## 2017-11-27 DIAGNOSIS — I35.0 NONRHEUMATIC AORTIC VALVE STENOSIS: ICD-10-CM

## 2017-11-27 DIAGNOSIS — R06.02 SOB (SHORTNESS OF BREATH): ICD-10-CM

## 2017-11-27 DIAGNOSIS — M32.8 OTHER FORMS OF SYSTEMIC LUPUS ERYTHEMATOSUS, UNSPECIFIED ORGAN INVOLVEMENT STATUS: Chronic | ICD-10-CM

## 2017-11-27 DIAGNOSIS — I73.9 PERIPHERAL ARTERIAL DISEASE: ICD-10-CM

## 2017-11-27 DIAGNOSIS — I35.0 NONRHEUMATIC AORTIC VALVE STENOSIS: Primary | ICD-10-CM

## 2017-11-27 DIAGNOSIS — Z98.61 POST PTCA: Chronic | ICD-10-CM

## 2017-11-27 DIAGNOSIS — I50.9 CONGESTIVE HEART FAILURE, UNSPECIFIED CONGESTIVE HEART FAILURE CHRONICITY, UNSPECIFIED CONGESTIVE HEART FAILURE TYPE: ICD-10-CM

## 2017-11-27 DIAGNOSIS — I50.33 ACUTE ON CHRONIC DIASTOLIC HEART FAILURE: Primary | ICD-10-CM

## 2017-11-27 DIAGNOSIS — I25.10 CORONARY ARTERY DISEASE, ANGINA PRESENCE UNSPECIFIED, UNSPECIFIED VESSEL OR LESION TYPE, UNSPECIFIED WHETHER NATIVE OR TRANSPLANTED HEART: Primary | ICD-10-CM

## 2017-11-27 DIAGNOSIS — I50.9 HEART FAILURE: ICD-10-CM

## 2017-11-27 DIAGNOSIS — I10 ESSENTIAL HYPERTENSION: ICD-10-CM

## 2017-11-27 DIAGNOSIS — I25.10 CORONARY ARTERY DISEASE INVOLVING NATIVE CORONARY ARTERY WITHOUT ANGINA PECTORIS, UNSPECIFIED WHETHER NATIVE OR TRANSPLANTED HEART: Chronic | ICD-10-CM

## 2017-11-27 LAB
ANION GAP SERPL CALC-SCNC: 9 MMOL/L
AORTIC VALVE REGURGITATION: ABNORMAL
AORTIC VALVE STENOSIS: ABNORMAL
BUN SERPL-MCNC: 45 MG/DL
CALCIUM SERPL-MCNC: 8.3 MG/DL
CHLORIDE SERPL-SCNC: 111 MMOL/L
CO2 SERPL-SCNC: 22 MMOL/L
CREAT SERPL-MCNC: 2.1 MG/DL
DIASTOLIC DYSFUNCTION: YES
EST. GFR  (AFRICAN AMERICAN): 26.7 ML/MIN/1.73 M^2
EST. GFR  (NON AFRICAN AMERICAN): 23.2 ML/MIN/1.73 M^2
ESTIMATED PA SYSTOLIC PRESSURE: 68.29
GLOBAL PERICARDIAL EFFUSION: ABNORMAL
GLUCOSE SERPL-MCNC: 88 MG/DL
MITRAL VALVE MOBILITY: NORMAL
MITRAL VALVE REGURGITATION: ABNORMAL
POTASSIUM SERPL-SCNC: 3.4 MMOL/L
RETIRED EF AND QEF - SEE NOTES: 55 (ref 55–65)
SODIUM SERPL-SCNC: 142 MMOL/L
TRICUSPID VALVE REGURGITATION: ABNORMAL
TROPONIN I SERPL DL<=0.01 NG/ML-MCNC: 0.1 NG/ML

## 2017-11-27 PROCEDURE — 25000003 PHARM REV CODE 250: Performed by: HOSPITALIST

## 2017-11-27 PROCEDURE — 93005 ELECTROCARDIOGRAM TRACING: CPT

## 2017-11-27 PROCEDURE — 93010 ELECTROCARDIOGRAM REPORT: CPT | Mod: ,,, | Performed by: INTERNAL MEDICINE

## 2017-11-27 PROCEDURE — 80048 BASIC METABOLIC PNL TOTAL CA: CPT | Mod: 91

## 2017-11-27 PROCEDURE — 20600001 HC STEP DOWN PRIVATE ROOM

## 2017-11-27 PROCEDURE — 84484 ASSAY OF TROPONIN QUANT: CPT

## 2017-11-27 PROCEDURE — 99223 1ST HOSP IP/OBS HIGH 75: CPT | Mod: ,,, | Performed by: HOSPITALIST

## 2017-11-27 PROCEDURE — 93306 TTE W/DOPPLER COMPLETE: CPT | Mod: S$GLB,,, | Performed by: INTERNAL MEDICINE

## 2017-11-27 PROCEDURE — 99499 UNLISTED E&M SERVICE: CPT | Mod: S$GLB,,, | Performed by: INTERNAL MEDICINE

## 2017-11-27 PROCEDURE — 99215 OFFICE O/P EST HI 40 MIN: CPT | Mod: S$GLB,,, | Performed by: INTERNAL MEDICINE

## 2017-11-27 PROCEDURE — 63600175 PHARM REV CODE 636 W HCPCS: Performed by: HOSPITALIST

## 2017-11-27 PROCEDURE — 99999 PR PBB SHADOW E&M-EST. PATIENT-LVL IV: CPT | Mod: PBBFAC,,, | Performed by: INTERNAL MEDICINE

## 2017-11-27 PROCEDURE — 99214 OFFICE O/P EST MOD 30 MIN: CPT | Mod: S$GLB,,, | Performed by: INTERNAL MEDICINE

## 2017-11-27 PROCEDURE — 36415 COLL VENOUS BLD VENIPUNCTURE: CPT

## 2017-11-27 PROCEDURE — 99999 PR PBB SHADOW E&M-EST. PATIENT-LVL V: CPT | Mod: PBBFAC,,, | Performed by: INTERNAL MEDICINE

## 2017-11-27 RX ORDER — ROSUVASTATIN CALCIUM 20 MG/1
40 TABLET, COATED ORAL NIGHTLY
Status: DISCONTINUED | OUTPATIENT
Start: 2017-11-27 | End: 2017-11-30 | Stop reason: HOSPADM

## 2017-11-27 RX ORDER — HYDROXYCHLOROQUINE SULFATE 200 MG/1
200 TABLET, FILM COATED ORAL 2 TIMES DAILY
Status: DISCONTINUED | OUTPATIENT
Start: 2017-11-27 | End: 2017-11-30 | Stop reason: HOSPADM

## 2017-11-27 RX ORDER — IRBESARTAN 150 MG/1
150 TABLET ORAL NIGHTLY
Qty: 90 TABLET | Refills: 3 | Status: ON HOLD | OUTPATIENT
Start: 2017-11-27 | End: 2017-11-30

## 2017-11-27 RX ORDER — ENOXAPARIN SODIUM 100 MG/ML
30 INJECTION SUBCUTANEOUS EVERY 24 HOURS
Status: DISCONTINUED | OUTPATIENT
Start: 2017-11-27 | End: 2017-11-30 | Stop reason: HOSPADM

## 2017-11-27 RX ORDER — CLOPIDOGREL BISULFATE 75 MG/1
75 TABLET ORAL DAILY
Status: DISCONTINUED | OUTPATIENT
Start: 2017-11-28 | End: 2017-11-30 | Stop reason: HOSPADM

## 2017-11-27 RX ORDER — CITALOPRAM 20 MG/1
20 TABLET, FILM COATED ORAL DAILY
Status: DISCONTINUED | OUTPATIENT
Start: 2017-11-27 | End: 2017-11-30 | Stop reason: HOSPADM

## 2017-11-27 RX ORDER — METOPROLOL SUCCINATE 50 MG/1
50 TABLET, EXTENDED RELEASE ORAL DAILY
Status: DISCONTINUED | OUTPATIENT
Start: 2017-11-28 | End: 2017-11-28

## 2017-11-27 RX ORDER — ASPIRIN 81 MG/1
81 TABLET ORAL DAILY
Status: DISCONTINUED | OUTPATIENT
Start: 2017-11-28 | End: 2017-11-30 | Stop reason: HOSPADM

## 2017-11-27 RX ORDER — POTASSIUM CHLORIDE 20 MEQ/1
40 TABLET, EXTENDED RELEASE ORAL ONCE
Status: COMPLETED | OUTPATIENT
Start: 2017-11-27 | End: 2017-11-27

## 2017-11-27 RX ORDER — IRBESARTAN 150 MG/1
150 TABLET ORAL NIGHTLY
Status: DISCONTINUED | OUTPATIENT
Start: 2017-11-27 | End: 2017-11-30 | Stop reason: HOSPADM

## 2017-11-27 RX ORDER — AMLODIPINE BESYLATE 10 MG/1
10 TABLET ORAL DAILY
Status: DISCONTINUED | OUTPATIENT
Start: 2017-11-28 | End: 2017-11-29

## 2017-11-27 RX ORDER — CLOPIDOGREL BISULFATE 75 MG/1
75 TABLET ORAL DAILY
Qty: 30 TABLET | Refills: 11 | Status: ON HOLD | OUTPATIENT
Start: 2017-11-27 | End: 2017-12-20 | Stop reason: HOSPADM

## 2017-11-27 RX ORDER — SODIUM CHLORIDE 0.9 % (FLUSH) 0.9 %
3 SYRINGE (ML) INJECTION EVERY 8 HOURS
Status: DISCONTINUED | OUTPATIENT
Start: 2017-11-27 | End: 2017-11-30 | Stop reason: HOSPADM

## 2017-11-27 RX ORDER — HYDRALAZINE HYDROCHLORIDE 10 MG/1
20 TABLET, FILM COATED ORAL EVERY 12 HOURS
Status: DISCONTINUED | OUTPATIENT
Start: 2017-11-27 | End: 2017-11-28

## 2017-11-27 RX ORDER — FUROSEMIDE 10 MG/ML
80 INJECTION INTRAMUSCULAR; INTRAVENOUS ONCE
Status: COMPLETED | OUTPATIENT
Start: 2017-11-27 | End: 2017-11-27

## 2017-11-27 RX ORDER — DIPHENHYDRAMINE HCL 25 MG
50 CAPSULE ORAL ONCE
Status: CANCELLED | OUTPATIENT
Start: 2017-11-27 | End: 2017-11-27

## 2017-11-27 RX ORDER — NITROGLYCERIN 0.4 MG/1
0.4 TABLET SUBLINGUAL EVERY 5 MIN PRN
Qty: 30 TABLET | Refills: 3 | Status: ON HOLD | OUTPATIENT
Start: 2017-11-27 | End: 2018-01-15 | Stop reason: SDUPTHER

## 2017-11-27 RX ADMIN — FUROSEMIDE 10 MG/HR: 10 INJECTION, SOLUTION INTRAVENOUS at 07:11

## 2017-11-27 RX ADMIN — IRBESARTAN 150 MG: 150 TABLET ORAL at 08:11

## 2017-11-27 RX ADMIN — POTASSIUM CHLORIDE 40 MEQ: 1500 TABLET, EXTENDED RELEASE ORAL at 09:11

## 2017-11-27 RX ADMIN — HYDRALAZINE HYDROCHLORIDE 20 MG: 10 TABLET, FILM COATED ORAL at 08:11

## 2017-11-27 RX ADMIN — HYDROXYCHLOROQUINE SULFATE 200 MG: 200 TABLET, FILM COATED ORAL at 08:11

## 2017-11-27 RX ADMIN — ROSUVASTATIN CALCIUM 40 MG: 20 TABLET, FILM COATED ORAL at 08:11

## 2017-11-27 RX ADMIN — FUROSEMIDE 80 MG: 10 INJECTION, SOLUTION INTRAVENOUS at 07:11

## 2017-11-27 RX ADMIN — ENOXAPARIN SODIUM 30 MG: 100 INJECTION SUBCUTANEOUS at 08:11

## 2017-11-27 RX ADMIN — CITALOPRAM HYDROBROMIDE 20 MG: 20 TABLET ORAL at 08:11

## 2017-11-27 ASSESSMENT — ROUTINE ASSESSMENT OF PATIENT INDEX DATA (RAPID3)
WHEN YOU AWAKENED IN THE MORNING OVER THE LAST WEEK, PLEASE INDICATE THE AMOUNT OF TIME IT TAKES UNTIL YOU ARE AS LIMBER AS YOU WILL BE FOR THE DAY: 1 HOUR
AM STIFFNESS SCORE: 1, YES
PATIENT GLOBAL ASSESSMENT SCORE: 7
FATIGUE SCORE: 7
PAIN SCORE: 7
MDHAQ FUNCTION SCORE: 1.2
PSYCHOLOGICAL DISTRESS SCORE: 3.3
TOTAL RAPID3 SCORE: 6

## 2017-11-27 NOTE — PROGRESS NOTES
"Subjective:       Patient ID: Ewelina Arnold is a 71 y.o. female.    Chief Complaint: Disease Management    HPI   Early 1990's developed acute respiratory failure and spent weeks in ICU [facility]    Positive CARYN, SSB    Hx Rash, leukopenia    On hydroxychloroquine >5 yr; saw eye MD Sept 2016    Hx:  CAD and Aortic stenosis and hx CHF; s/p PCI; ASA , statin, BB, hydralazine  HTN cont amlodipine, coreg, hydralazine  Pulmonary nodule; saw Dr Logan Sept; no change  Thyroid disease; saw Dr Lemon who is now gone; followed by PCP  CKD; she donated a kidney to her brother; is a patient of Dr Ruffin  Anemia evaluated by Dr. Pham and bone marrow c/w anemia of chronic disease    Review of Systems   Constitutional: Positive for unexpected weight change. Negative for fever.   HENT: Negative for mouth sores and trouble swallowing.         No Dry mouth   Eyes: Negative for redness.        No Dry eyes   Respiratory: Positive for shortness of breath. Negative for cough.    Cardiovascular: Positive for leg swelling. Negative for chest pain.   Gastrointestinal: Negative for abdominal pain, constipation and diarrhea.   Genitourinary: Negative for dysuria and genital sores.   Skin: Positive for rash.        Skin lesion L chest diagnosed as eczema   Neurological: Negative for headaches.   Hematological: Negative for adenopathy. Does not bruise/bleed easily.   Psychiatric/Behavioral: Negative for sleep disturbance.         Objective:   BP (!) 154/69   Pulse 66   Ht 5' 2.5" (1.588 m)   Wt 61.1 kg (134 lb 12.8 oz)   LMP  (LMP Unknown)   BMI 24.26 kg/m²      Physical Exam   Skin:     No digital changes  One cm pink papular lesion on L upper chest          Lab Results   Component Value Date    SEDRATE 24 (H) 11/21/2017      Lab Results   Component Value Date    CREATININE 2.0 (H) 11/27/2017      Assessment:       1. Other forms of systemic lupus erythematosus, unspecified organ involvement status        SLE overall appears quiet except may " account for sl decrease WBC and some of her anemia     She has been on HcQ for many years so I doubt it is causing cardiomyopathy    Plan:       1. Continue  mg/d for now; continue eye checks  2. Proceed with other studies for heart, kidney, etc  3. RTC me in 6 mo with lab before

## 2017-11-27 NOTE — LETTER
November 27, 2017      Pedrito Rob MD  [address]  [facility] [id number]           [facility]  [address]  [facility] [id number]  Phone: [phone]          Patient: Ewelina Arnold   MR Number: [id number]   YOB: 1945   Date of Visit: 11/27/2017       Dear Dr. Pedrito Rob:    Thank you for referring Ewelina Arnold to me for evaluation. Attached you will find relevant portions of my assessment and plan of care.    If you have questions, please do not hesitate to call me. I look forward to following Ewelina Arnold along with you.    Sincerely,    Chandan Ly MD    Enclosure  CC:  No Recipients    If you would like to receive this communication electronically, please contact [email or web address] or [phone] to request more information on "[employer] Link access.    For providers and/or their staff who would like to refer a patient to [facility], please contact us through our one-stop-shop provider referral line, [facility] , at [phone].    If you feel you have received this communication in error or would no longer like to receive these types of communications, please e-mail [email or web address]

## 2017-11-27 NOTE — PROGRESS NOTES
Subjective:    Patient ID:  Ewelina Arnold is a 71 y.o. female who presents for evaluation of aortic stenosis.    Referring physician: Dr Wyman/ Dr Rob  Reason for referral: AS    HPI  Patient is a 71 year old female with PMH sx for:   CAD s/p PCI to LAD 2011   Moderate AS   Single kidney s/p kidney donation 1985 (baseline cr 1.5-1.7) most recently 1.9   SLE   Hx of partial colectomy for polyps and colon cancer (now cancer free)  Who is referred for evaluation of AS.  As per patient she has had 1 month hx of increasing PEDRO and SOB, with recent hospital admission for decompensated HF.  HER BP at home runs 170-180's.  As per patient she can lay flat, but this does make her feel dyspneic.  NYHA class 3 symptoms, SOB at about 3-4 steps.  States her normal weight is about 122, but she weighs 130 today, she does not check daily weight.  She eats low salt diet.  She was recently evaluated by Dr. Rob 11/4/2017 and she was determined to be moderate surgical risk due to comorbidities, however her most recent echo does not qualify for TAVR.  Patient states there have been a lot of medication changes, including addition of hydralazine, metoprolol (coreg stopped), lasix and HCTZ stopped, demadex added in place.  She states she has not been urinating after taking demadex.  Additionally, when pt received stent in 2011 - she had similar symptoms of SOB.         The patient has undergone the following TAVR work-up:   · ECHO (Date 10/30/17): PABLITO= 0.96cm2, MG= 28mmHg, Peak Abdulkadir= 3.7m/s, EF= 60%.   · LHC : pending  · STS: pending  · Frailty: pending  · Iliacs arepending  · LVOT area by CTA is pending  · Incidental findings on CT:pending  · CT Surgery risk assessment: moderate risk, per Dr Rob due to comorbidities  · Rhythm issues: none  · PFTs: FEV1 pending  · Comorbidities: CKD/single kidney/PH    Review of Systems   Constitution: Positive for malaise/fatigue and weight gain. Negative for weakness.   Eyes: Negative for  "vision loss in left eye and vision loss in right eye.   Cardiovascular: Positive for dyspnea on exertion and leg swelling. Negative for chest pain, claudication, irregular heartbeat, near-syncope, orthopnea, palpitations, paroxysmal nocturnal dyspnea and syncope.   Respiratory: Negative for cough, hemoptysis and shortness of breath.    Endocrine: Negative for cold intolerance and heat intolerance.   Musculoskeletal: Negative for arthritis, falls, joint pain and joint swelling.   Gastrointestinal: Negative for bloating and abdominal pain.   Psychiatric/Behavioral: Negative for altered mental status and depression.        BP (!) 163/70 (BP Location: Right arm, Patient Position: Sitting, BP Method: Large (Automatic))   Pulse 66   Ht 5' 5" (1.651 m)   Wt 59.5 kg (131 lb 2.8 oz)   LMP  (LMP Unknown)   SpO2 96%   BMI 21.83 kg/m²       Physical Exam   Constitutional: She is oriented to person, place, and time. She appears well-developed and well-nourished.   HENT:   Head: Normocephalic and atraumatic.   Eyes: EOM are normal. Pupils are equal, round, and reactive to light.   Neck: Normal range of motion. Neck supple. JVD present.   Cardiovascular:   Murmur heard.   Harsh midsystolic murmur is present  at the upper right sternal border radiating to the neck  Pulmonary/Chest: Effort normal. She has rales.   Abdominal: Soft. Bowel sounds are normal.   Musculoskeletal: Normal range of motion. She exhibits edema.   Neurological: She is alert and oriented to person, place, and time.         Assessment:       1. Nonrheumatic aortic valve stenosis - moderate AS.  BAV would not be beneficial at this point in time.   2. New onset decompensated HF: needs eval for CAD, LHC +/- KIT, asp/plavix load, BP control start avapro (BNP was 1000)   3. Coronary artery disease involving native coronary artery without angina pectoris, unspecified whether native or transplanted heart - LHC - patients symptoms similar to 2011 when she last " needed stent.  Recent NSTEMI.   4. Bilateral carotid artery disease    5. Peripheral arterial disease    6. Stage 3 chronic kidney disease    7. Other forms of systemic lupus erythematosus, unspecified organ involvement status         Plan:       Start avapro  Plavix load  Daily aspirin  Double dose of demadex today and tomorrow    [facility] thurs, right CFA Access, low contrast technique given CKD        Staff:  I have personally taken the history and examined this patient and agree with the fellow's note as stated above and amended it accordingly :-) This patient has moderate AS and severe diastolic dysfunction with pulmonary HTN.  Will do cath and possible PCI and treat her medically for her diastolic dysfunction.

## 2017-11-27 NOTE — PLAN OF CARE
Direct Admit From Clinic (or Home) Acceptance Note    Referring Physician: Elham Wyman MD (Cardiology)    Accepting Physician: Christy Ly MD    Date of Acceptance: 11/27/2017     Location of Patient: Home    Reason for Admit: ADHF    Report from Referring Physician: Ms Ewelina Arnold is a 72 y/o lady with HTN, HLD,  CKD-3 in solitary kidney due to kidney donation '85, most recently 1.9 (Dr Ruffin), CAD s/p PCI to LAD 2011, moderate AS, SLE on plaquenil, Hx of partial colectomy for polyps and colon cancer (in remission), GERD, hypothyroidism, multiple med allergies, seen in Valve/interventional Clinic with Dr BHAKTI Ly today, then sent to Echo lab with Dr PURDY, then home. Dr PURDY just read the patient's Echo and me for admission to [facility].  Dr PURDY called patient at home and instructed her to come in to the admit office.     Recent admit last weekend, diuresed with Lasix, sent home with bumex then torsemide but not working now, may not be absorbing oral meds due to gut edema    Per Dr PURDY, today the patient was clinically in ADHF (legs 3+ edema), and the Echo reveals pleural effusions and new pericardial effusion.  DDx per Dr PURDY: Worsening diastolic dysfunction due to CAD vs hypertensive heart disease.  She had similar Sx in 2011 when she had PCI    Needs diuresis then LHC if renal function will allow    To Do List: Admit to C/J, check basic labs, EKG, CXR, chart review, start diuresis with Lasix 80 IV then Lasix gtt at 10. HOLD plaquenil and consider Rheum consult. I will consult cardiology co-management team tomorrow. I will discuss with Valve team re: if need for consult.     Please call extension [phone] upon patient arrival to floor for Hospital Medicine admit team assignment and for additional admit orders. If patient is coming from another [facility] facility please also call [phone] to inform the admit team/office that patient has arrived from the [facility] facility to the floor so patient can be evaluated.    Christy Ly,  MD  Hospital Medicine Staff  Pager: [phone]

## 2017-11-27 NOTE — PROGRESS NOTES
OUTPATIENT CATHETERIZATION INSTRUCTIONS    You have been scheduled for a procedure in the catheterization lab on Wednesday, December 20, 2017.     Please report to the Cardiology Waiting Area on the Third floor of the hospital and check in at 9 AM.   You will then be taken to the SSCU (Short Stay Cardiac Unit) and prepared for your procedure. Please be aware that this is not the time of your procedure but the time you are to arrive. The procedures are scheduled on an hourly basis; however, emergency cases take precedence over all other cases.       You may not have anything to eat or drink after midnight the night before your test. You may take your regular morning medications with water. If there are any medications that you should not take you will be instructed to hold them that morning. If you are diabetic and on Metformin (Glucophage) do not take it the day before, the day of, and for 2 days after your procedure.      The procedure will take 1-2 hours to perform. After the procedure, you will return to SSCU on the third floor of the hospital. You will need to lie still (or keep your arm still) for the next 4 to 6 hours to minimize bleeding from the puncture site. Your family may remain in the room with you during this time.       You may be able to be discharged home that same afternoon if there is someone to drive you home and there were no complications. If you have one of the balloon, stent, or device procedures you may spend the night in the hospital. Your doctor will determine, based on your progress, the date and time of your discharge. The results of your procedure will be discussed with you before you are discharged. Any further testing or procedures will be scheduled for you either before you leave or you will be called with these appointments.       If you should have any questions, concerns, or need to change the date of your procedure, please call FLACA Johnson @ [phone]    Special  Instructions:  Plavix 75m tablets night before and 1 tablet morning of          THE ABOVE INSTRUCTIONS WERE GIVEN TO THE PATIENT VERBALLY AND THEY VERBALIZED UNDERSTANDING.  THEY DO NOT REQUIRE ANY SPECIAL NEEDS AND DO NOT HAVE ANY LEARNING BARRIERS.          Directions for Reporting to Cardiology Waiting Area in the Hospital  If you park in the Parking Garage:  Take elevators to the1st floor of the parking garage.  Continue past the gift shop, coffee shop, and piano.  Take a right and go to the gold elevators. (Elevator B)  Take the elevator to the 3rd floor.  Follow the arrow on the sign on the wall that says Cath Lab Registration/EP Lab Registration.  Follow the long hallway all the way around until you come to a big open area.  This is the registration area.  Check in at Reception Desk.    OR    If family is dropping you off:  Have them drop you off at the front of the Hospital under the green overhang.  Enter through the doors and take a right.  Take the E elevators to the 3rd floor Cardiology Waiting Area.  Check in at the Reception Desk in the waiting room.

## 2017-11-28 DIAGNOSIS — I50.33 ACUTE ON CHRONIC DIASTOLIC CONGESTIVE HEART FAILURE: Primary | ICD-10-CM

## 2017-11-28 LAB
ALBUMIN SERPL BCP-MCNC: 2.8 G/DL
ALP SERPL-CCNC: 82 U/L
ALT SERPL W/O P-5'-P-CCNC: 18 U/L
ANION GAP SERPL CALC-SCNC: 10 MMOL/L
AST SERPL-CCNC: 40 U/L
BASOPHILS # BLD AUTO: 0.03 K/UL
BASOPHILS NFR BLD: 0.6 %
BILIRUB SERPL-MCNC: 0.2 MG/DL
BNP SERPL-MCNC: 2331 PG/ML
BUN SERPL-MCNC: 47 MG/DL
CALCIUM SERPL-MCNC: 8.4 MG/DL
CHLORIDE SERPL-SCNC: 110 MMOL/L
CO2 SERPL-SCNC: 21 MMOL/L
CREAT SERPL-MCNC: 1.9 MG/DL
DIFFERENTIAL METHOD: ABNORMAL
EOSINOPHIL # BLD AUTO: 0.1 K/UL
EOSINOPHIL NFR BLD: 2.6 %
ERYTHROCYTE [DISTWIDTH] IN BLOOD BY AUTOMATED COUNT: 14.6 %
EST. GFR  (AFRICAN AMERICAN): 30.1 ML/MIN/1.73 M^2
EST. GFR  (NON AFRICAN AMERICAN): 26.1 ML/MIN/1.73 M^2
GLUCOSE SERPL-MCNC: 67 MG/DL
HCT VFR BLD AUTO: 28.5 %
HGB BLD-MCNC: 9.1 G/DL
IMM GRANULOCYTES # BLD AUTO: 0.01 K/UL
IMM GRANULOCYTES NFR BLD AUTO: 0.2 %
LYMPHOCYTES # BLD AUTO: 1.1 K/UL
LYMPHOCYTES NFR BLD: 24.4 %
MAGNESIUM SERPL-MCNC: 1.5 MG/DL
MCH RBC QN AUTO: 25.9 PG
MCHC RBC AUTO-ENTMCNC: 31.9 G/DL
MCV RBC AUTO: 81 FL
MONOCYTES # BLD AUTO: 0.8 K/UL
MONOCYTES NFR BLD: 16.7 %
NEUTROPHILS # BLD AUTO: 2.6 K/UL
NEUTROPHILS NFR BLD: 55.5 %
NRBC BLD-RTO: 0 /100 WBC
PHOSPHATE SERPL-MCNC: 4.2 MG/DL
PLATELET # BLD AUTO: 215 K/UL
PMV BLD AUTO: 10.5 FL
POTASSIUM SERPL-SCNC: 3.8 MMOL/L
PROT SERPL-MCNC: 6.8 G/DL
RBC # BLD AUTO: 3.52 M/UL
SODIUM SERPL-SCNC: 141 MMOL/L
TROPONIN I SERPL DL<=0.01 NG/ML-MCNC: 0.1 NG/ML
TSH SERPL DL<=0.005 MIU/L-ACNC: 2.45 UIU/ML
WBC # BLD AUTO: 4.68 K/UL

## 2017-11-28 PROCEDURE — 36415 COLL VENOUS BLD VENIPUNCTURE: CPT

## 2017-11-28 PROCEDURE — 63600175 PHARM REV CODE 636 W HCPCS: Performed by: HOSPITALIST

## 2017-11-28 PROCEDURE — 25000003 PHARM REV CODE 250: Performed by: HOSPITALIST

## 2017-11-28 PROCEDURE — 83735 ASSAY OF MAGNESIUM: CPT

## 2017-11-28 PROCEDURE — 83880 ASSAY OF NATRIURETIC PEPTIDE: CPT

## 2017-11-28 PROCEDURE — 84443 ASSAY THYROID STIM HORMONE: CPT

## 2017-11-28 PROCEDURE — A4216 STERILE WATER/SALINE, 10 ML: HCPCS | Performed by: HOSPITALIST

## 2017-11-28 PROCEDURE — 80053 COMPREHEN METABOLIC PANEL: CPT

## 2017-11-28 PROCEDURE — 85025 COMPLETE CBC W/AUTO DIFF WBC: CPT

## 2017-11-28 PROCEDURE — 94799 UNLISTED PULMONARY SVC/PX: CPT

## 2017-11-28 PROCEDURE — 84100 ASSAY OF PHOSPHORUS: CPT

## 2017-11-28 PROCEDURE — 99233 SBSQ HOSP IP/OBS HIGH 50: CPT | Mod: ,,, | Performed by: HOSPITALIST

## 2017-11-28 PROCEDURE — 20600001 HC STEP DOWN PRIVATE ROOM

## 2017-11-28 PROCEDURE — 84484 ASSAY OF TROPONIN QUANT: CPT

## 2017-11-28 RX ORDER — HYDRALAZINE HYDROCHLORIDE 10 MG/1
20 TABLET, FILM COATED ORAL 2 TIMES DAILY
Status: DISCONTINUED | OUTPATIENT
Start: 2017-11-28 | End: 2017-11-30 | Stop reason: HOSPADM

## 2017-11-28 RX ORDER — CARVEDILOL 6.25 MG/1
12.5 TABLET ORAL 2 TIMES DAILY WITH MEALS
Status: DISCONTINUED | OUTPATIENT
Start: 2017-11-28 | End: 2017-11-30 | Stop reason: HOSPADM

## 2017-11-28 RX ORDER — POTASSIUM CHLORIDE 20 MEQ/1
40 TABLET, EXTENDED RELEASE ORAL ONCE
Status: COMPLETED | OUTPATIENT
Start: 2017-11-28 | End: 2017-11-28

## 2017-11-28 RX ORDER — HYDRALAZINE HYDROCHLORIDE 25 MG/1
25 TABLET, FILM COATED ORAL ONCE
Status: DISCONTINUED | OUTPATIENT
Start: 2017-11-28 | End: 2017-11-28

## 2017-11-28 RX ORDER — LATANOPROST 50 UG/ML
1 SOLUTION/ DROPS OPHTHALMIC NIGHTLY
Status: DISCONTINUED | OUTPATIENT
Start: 2017-11-28 | End: 2017-11-30 | Stop reason: HOSPADM

## 2017-11-28 RX ORDER — MAGNESIUM SULFATE HEPTAHYDRATE 40 MG/ML
2 INJECTION, SOLUTION INTRAVENOUS ONCE
Status: COMPLETED | OUTPATIENT
Start: 2017-11-28 | End: 2017-11-28

## 2017-11-28 RX ORDER — CLONIDINE HYDROCHLORIDE 0.1 MG/1
0.1 TABLET ORAL EVERY 6 HOURS PRN
Status: DISCONTINUED | OUTPATIENT
Start: 2017-11-28 | End: 2017-11-28

## 2017-11-28 RX ORDER — HYDRALAZINE HYDROCHLORIDE 50 MG/1
50 TABLET, FILM COATED ORAL EVERY 8 HOURS
Status: DISCONTINUED | OUTPATIENT
Start: 2017-11-28 | End: 2017-11-28

## 2017-11-28 RX ADMIN — ASPIRIN 81 MG: 81 TABLET, COATED ORAL at 09:11

## 2017-11-28 RX ADMIN — IRBESARTAN 150 MG: 150 TABLET ORAL at 09:11

## 2017-11-28 RX ADMIN — HYDROXYCHLOROQUINE SULFATE 200 MG: 200 TABLET, FILM COATED ORAL at 09:11

## 2017-11-28 RX ADMIN — ENOXAPARIN SODIUM 30 MG: 100 INJECTION SUBCUTANEOUS at 04:11

## 2017-11-28 RX ADMIN — HYDRALAZINE HYDROCHLORIDE 20 MG: 10 TABLET, FILM COATED ORAL at 09:11

## 2017-11-28 RX ADMIN — THERA TABS 1 TABLET: TAB at 09:11

## 2017-11-28 RX ADMIN — MAGNESIUM SULFATE IN WATER 2 G: 40 INJECTION, SOLUTION INTRAVENOUS at 09:11

## 2017-11-28 RX ADMIN — ROSUVASTATIN CALCIUM 40 MG: 20 TABLET, FILM COATED ORAL at 09:11

## 2017-11-28 RX ADMIN — CLOPIDOGREL 75 MG: 75 TABLET, FILM COATED ORAL at 09:11

## 2017-11-28 RX ADMIN — AMLODIPINE BESYLATE 10 MG: 10 TABLET ORAL at 09:11

## 2017-11-28 RX ADMIN — CARVEDILOL 12.5 MG: 6.25 TABLET, FILM COATED ORAL at 09:11

## 2017-11-28 RX ADMIN — LEVOTHYROXINE SODIUM 75 MCG: 50 TABLET ORAL at 06:11

## 2017-11-28 RX ADMIN — CARVEDILOL 12.5 MG: 6.25 TABLET, FILM COATED ORAL at 04:11

## 2017-11-28 RX ADMIN — FUROSEMIDE 10 MG/HR: 10 INJECTION, SOLUTION INTRAVENOUS at 05:11

## 2017-11-28 RX ADMIN — CITALOPRAM HYDROBROMIDE 20 MG: 20 TABLET ORAL at 09:11

## 2017-11-28 RX ADMIN — LATANOPROST 1 DROP: 50 SOLUTION OPHTHALMIC at 09:11

## 2017-11-28 RX ADMIN — SODIUM CHLORIDE, PRESERVATIVE FREE 3 ML: 5 INJECTION INTRAVENOUS at 01:11

## 2017-11-28 RX ADMIN — POTASSIUM CHLORIDE 40 MEQ: 1500 TABLET, EXTENDED RELEASE ORAL at 09:11

## 2017-11-28 NOTE — PROGRESS NOTES
Patient arrived to the unit. Telemetry box applied and vital signs documented in flow sheet. IV 20 jannette initiated. Identification band placed on patient. Oriented to room, call bell with in reach, bed is in low lock position. Admit completed, plan of care initiated. Will continue to monitor.

## 2017-11-28 NOTE — HPI
71F with HTN, HLD, CKD3 in solitary kidney due to kidney donation '85, CAD s/p PCI to LAD 2011, moderate AS, SLE on plaquenil, hx of partial colectomy for polyps and colon cancer (in remission), GERD, hypothyroidism, multiple med allergies, seen in Valve/interventional Clinic with Dr BHAKTI Ly today, then sent to Echo lab with Dr PURDY, then home. Recent admit 11/17-11/19 for acute heart failure exacerbation, diuresed with Lasix, sent home with bumex then torsemide. Per Dr PURDY, today the patient was clinically in ADHF (legs 3+ edema), and the Echo reveals pleural effusions and new pericardial effusion.  DDx per Dr PURDY: Worsening diastolic dysfunction due to CAD vs hypertensive heart disease.  She had similar Sx in 2011 when she had PCI. Needs diuresis then LHC if renal function will allow. Pt reports feeling dyspnic even after discharge from her recent admission; believes the shortness of breath has been progressive for the past month. States that her lower extremity edema has been persistent since being admitted. States that she does not urinate much after taking her home torsemide. Reports 2-pillow orthopnea. Denies PND. Denies chest pain or palpitations, but states that she feels a tightness around her chest for which she unhooks her bra for relief although she continues to have that sensation after removing the bra. States that she watches the sodium and fluid in her diet. Does not perform daily weights. Gained 10-15 lbs in the past month.

## 2017-11-28 NOTE — PROGRESS NOTES
11/28/17 0353   Vital Signs   BP (!) 171/76   MAP (mmHg) 109   Patient Position Lying     Pt asymptomatic, lying comfortably in bed. Notified Dr. Goldsmith, stated he was okay with BP where it's at. Stated would put in PRN orders for SBP > 180. Will implement. Will continue to monitor.

## 2017-11-28 NOTE — H&P
[facility] Hospital Medicine  History & Physical    Patient Name: Ewelina Arnold  MRN: [id number]  Admission Date: 11/27/2017  Attending Physician: Christy Ly MD   Primary Care Provider: Kalie Walton MD    [facility] Medicine Team: [facility]OS[facility] MED C    Patient information was obtained from patient and past medical records.     Subjective:     Principal Problem:Acute on chronic diastolic heart failure    Chief Complaint: swelling x 1 month     HPI: 71F with HTN, HLD, CKD3 in solitary kidney due to kidney donation '85, CAD s/p PCI to LAD 2011, moderate AS, SLE on plaquenil, hx of partial colectomy for polyps and colon cancer (in remission), GERD, hypothyroidism, multiple med allergies, seen in Valve/interventional Clinic with Dr BHAKTI Ly today, then sent to Echo lab with Dr PURDY, then home. Recent admit 11/17-11/19 for acute heart failure exacerbation, diuresed with Lasix, sent home with bumex then torsemide. Per Dr PURDY, today the patient was clinically in ADHF (legs 3+ edema), and the Echo reveals pleural effusions and new pericardial effusion.  DDx per Dr PURDY: Worsening diastolic dysfunction due to CAD vs hypertensive heart disease.  She had similar Sx in 2011 when she had PCI. Needs diuresis then LHC if renal function will allow. Pt reports feeling dyspnic even after discharge from her recent admission; believes the shortness of breath has been progressive for the past month. States that her lower extremity edema has been persistent since being admitted. States that she does not urinate much after taking her home torsemide. Reports 2-pillow orthopnea. Denies PND. Denies chest pain or palpitations, but states that she feels a tightness around her chest for which she unhooks her bra for relief although she continues to have that sensation after removing the bra. States that she watches the sodium and fluid in her diet. Does not perform daily weights. Gained 10-15 lbs in the past month.     Past  Medical History:   Diagnosis Date    Acute on chronic diastolic congestive heart failure 11/17/2017    Allergy     Anatomical narrow angle glaucoma     Anemia     States diagnosed about a month ago    Aortic atherosclerosis     Arthritis     Cataract     CHF (congestive heart failure)     Chronic kidney disease     Chronic sciatica of left side     Right lower back pain due to sciatica    Coronary artery disease     Depression     Dry eyes     GERD (gastroesophageal reflux disease)     History of colon cancer     Hyperlipidemia     Hypertension     Hypothyroidism     Left ventricular diastolic dysfunction with preserved systolic function     Lupus (systemic lupus erythematosus) 8/17/2012    Malnutrition 5/16/2017    Osteoarthritis of cervical spine 8/17/2012    Osteopenia     PAD (peripheral artery disease)        Past Surgical History:   Procedure Laterality Date    CARDIAC CATHETERIZATION  07/27/2011    x1    CHOLECYSTECTOMY  1999    COLON SURGERY  2000 & 2003    partial removal    COLONOSCOPY N/A 4/14/2016    Procedure: COLONOSCOPY;  Surgeon: Jose Steen MD;  Location: [facility] ([address] [place] [address]);  Service: Endoscopy;  Laterality: N/A;  prep 2 days prior light meals only/clear liquid day before  and 4 dulcolax tabs (5 mgs) at noon    COLONOSCOPY N/A 9/14/2017    Procedure: COLONOSCOPY;  Surgeon: Jose Steen MD;  Location: [facility] ([address] [place] [address]);  Service: Endoscopy;  Laterality: N/A;    EYE SURGERY      HAND SURGERY Bilateral 1996 & 1997    due to carpal tunnel     HERNIA REPAIR      HYSTERECTOMY  1983    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    Arthrotec 50 [diclofenac-misoprostol]      Other reaction(s): Unknown    Bentyl [dicyclomine]      Not effective    Doxycycline      nausea    Imdur [isosorbide mononitrate] Nausea Only    Lomotil [diphenoxylate-atropine]      Stomach cramps, pt vomitted    Lozol [indapamide] Rash    Tramadol Nausea Only       No current facility-administered medications on file prior to encounter.      Current Outpatient Prescriptions on File Prior to Encounter   Medication Sig    acetaminophen (TYLENOL) 500 MG tablet Take 500 mg by mouth every 6 (six) hours as needed for Pain.    amLODIPine (NORVASC) 10 MG tablet Take 1 tablet (10 mg total) by mouth once daily.    aspirin (ECOTRIN) 81 MG EC tablet Take 1 tablet (81 mg total) by mouth once daily. (Patient taking differently: Take 81 mg by mouth once daily. 1 tablet every other day)    cholestyramine (QUESTRAN) 4 gram packet Take 1 packet (4 g total) by mouth 3 (three) times daily with meals.    citalopram (CELEXA) 20 MG tablet Take 1 tablet (20 mg total) by mouth once daily.    hydrALAZINE (APRESOLINE) 10 MG tablet TAKE 2 TABLETS EVERY 12 HOURS    hydroxychloroquine (PLAQUENIL) 200 mg tablet TAKE 1 TABLET TWICE DAILY    latanoprost 0.005 % ophthalmic solution INSTILL 1 DROP INTO BOTH EYES EVERY DAY    levothyroxine (SYNTHROID) 75 MCG tablet Take 1 tablet (75 mcg total) by mouth before breakfast.    metoprolol succinate (TOPROL-XL) 50 MG 24 hr tablet Take 1 tablet (50 mg total) by mouth once daily.    rosuvastatin (CRESTOR) 40 MG Tab Take 1 tablet (40 mg total) by mouth every evening.    torsemide (DEMADEX) 20 MG Tab Take 1 tablet (20 mg total) by mouth once daily.    VITAMIN D2 50,000 unit capsule TAKE 1 CAPSULE (50,000 UNITS TOTAL) EVERY 30 DAYS    ammonium lactate 12 % Crea Apply 1  application topically 2 (two) times daily as needed.    betamethasone dipropionate (DIPROLENE) 0.05 % cream Apply topically 2 (two) times daily as needed.    butalbital-aspirin-caffeine -40 mg (FIORINAL) -40 mg Cap Take 1 capsule by mouth every 6 (six) hours as needed.     clopidogrel (PLAVIX) 75 mg tablet Take 1 tablet (75 mg total) by mouth once daily.    conjugated estrogens (PREMARIN) vaginal cream Use 1g nightly for two weeks, then 1g twice per week.    fexofenadine (ALLEGRA) 180 MG tablet Take 1 tablet (180 mg total) by mouth once daily. (Patient taking differently: Take 180 mg by mouth as needed. )    hyoscyamine (LEVSIN/SL) 0.125 mg Subl Place 1 tablet (0.125 mg total) under the tongue 2 (two) times daily as needed.    ipratropium (ATROVENT) 0.03 % nasal spray 2 sprays by Nasal route 2 (two) times daily as needed for Rhinitis.    irbesartan (AVAPRO) 150 MG tablet Take 1 tablet (150 mg total) by mouth every evening.    meclizine (ANTIVERT) 12.5 mg tablet Take 1 tablet (12.5 mg total) by mouth 3 (three) times daily as needed.    multivitamin capsule Take 1 capsule by mouth once daily.    nitroGLYCERIN (NITROSTAT) 0.4 MG SL tablet PLACE 1 TABLET UNDER THE TONGUE EVERY 5 MINUTES FOR 3 DOSES --IF NOT RELIEVED GO TO THE ER    nitroGLYCERIN (NITROSTAT) 0.4 MG SL tablet Place 1 tablet (0.4 mg total) under the tongue every 5 (five) minutes as needed for Chest pain.    nystatin (MYCOSTATIN) ointment Apply topically 2 (two) times daily.    nystatin-triamcinolone (MYCOLOG II) cream Apply topically 4 (four) times daily.    omeprazole (PRILOSEC) 40 MG capsule Take 1 capsule (40 mg total) by mouth once daily.    TENS units Sravanthi 1 application by Misc.(Non-Drug; Combo Route) route daily as needed (pain).    triamcinolone acetonide 0.1% (KENALOG) 0.1 % paste PLACE ONTO TEETH TWICE DAILY (Patient taking differently: PLACE ONTO TEETH PRN)     Family History     Problem Relation (Age of Onset)     Diabetes Maternal Grandmother, Son, Maternal Aunt    Heart attack Mother    Heart disease Father    Hypertension Mother, Father, Sister, Brother    Kidney disease Brother    Kidney failure Brother    No Known Problems Daughter, Maternal Grandfather, Paternal Grandmother, Paternal Grandfather        Social History Main Topics    Smoking status: Former Smoker     Packs/day: 1.50     Years: 30.00     Types: Cigarettes     Start date: 1955     Quit date: 3/13/1985    Smokeless tobacco: Never Used    Alcohol use No    Drug use: No    Sexual activity: Not Currently     Partners: Male     Birth control/ protection: Abstinence     Review of Systems   Constitutional: Positive for activity change, fatigue and unexpected weight change. Negative for fever.   Respiratory: Positive for chest tightness and shortness of breath. Negative for cough and wheezing.    Cardiovascular: Positive for leg swelling. Negative for chest pain and palpitations.   All other systems reviewed and are negative.    Objective:     Vital Signs (Most Recent):  Temp: 98.3 °F (36.8 °C) (11/27/17 1733)  Pulse: 68 (11/27/17 1733)  Resp: 18 (11/27/17 1733)  BP: (!) 181/84 (11/27/17 1733)  SpO2: (!) 94 % (11/27/17 1733) Vital Signs (24h Range):  Temp:  [98.3 °F (36.8 °C)] 98.3 °F (36.8 °C)  Pulse:  [66-68] 68  Resp:  [18] 18  SpO2:  [94 %-96 %] 94 %  BP: (154-181)/(69-84) 181/84        Body mass index is 22.43 kg/m².    Physical Exam   Constitutional: She is oriented to person, place, and time. She appears well-nourished. No distress.   HENT:   Head: Normocephalic and atraumatic.   Nose: Nose normal.   Mouth/Throat: Oropharynx is clear and moist. No oropharyngeal exudate.   Eyes: Conjunctivae and EOM are normal. Pupils are equal, round, and reactive to light. Right eye exhibits no discharge. Left eye exhibits no discharge. No scleral icterus.   Neck: Neck supple. No JVD present. No tracheal deviation present. No thyromegaly present.   Cardiovascular:  Normal rate, regular rhythm and intact distal pulses.  Exam reveals no friction rub.    No murmur heard.  Systolic ejection murmur heart throughout, best at 2nd right intercostal space   Pulmonary/Chest: Effort normal and breath sounds normal. No stridor. No respiratory distress. She has no wheezes. She has no rales (bibasilar). She exhibits no tenderness.   Abdominal: Soft. Bowel sounds are normal. She exhibits no distension and no mass. There is no tenderness. There is no rebound and no guarding.   Musculoskeletal: She exhibits edema (3+ pitting to the knees). She exhibits no tenderness or deformity.   Lymphadenopathy:     She has no cervical adenopathy.   Neurological: She is alert and oriented to person, place, and time. No cranial nerve deficit. She exhibits normal muscle tone. Coordination normal.   Skin: Skin is warm and dry. No rash noted. She is not diaphoretic. No erythema. No pallor.   Psychiatric: She has a normal mood and affect. Her behavior is normal. Judgment and thought content normal.   Vitals reviewed.       Significant Labs:   CBC:   Recent Labs  Lab 11/27/17  1200   WBC 4.75   HGB 9.7*   HCT 30.9*        CMP:   Recent Labs  Lab 11/27/17  1200      K 3.6      CO2 20*   GLU 63*   BUN 43*   CREATININE 2.0*   CALCIUM 8.9   ANIONGAP 12   EGFRNONAA 24.6*     CXR & EKG ordered, pending    Assessment/Plan:     * Acute on chronic diastolic heart failure    -presents w/ dyspnea, orthopnea, edema w/ concern for HF exacerbation  -outpatient TTE w/ grade 2 diastolic dysfunction, pHTN, moderate AS, small pericardial effusion  -likely w/ gut edema & not absorbing oral diuretics  -cxr, ekg, bnp, troponin pending  -start iv lasix 80 x 1, then gtt at 10 ml/hr  -bp control  -sodium/fluid restriction  -strict I/os, daily weights  -cards consult          Stage 3 chronic kidney disease    -cr at baseline of 1.8-2  -avoid nephrotoxins, renally dose meds          Hypertension    -'s; resume  home antihypertensives            VTE Risk Mitigation         Ordered     enoxaparin injection 30 mg  Daily     Route:  Subcutaneous        11/27/17 1847     Medium Risk of VTE  Once      11/27/17 1847             Kaye Urias MD  Department of Hospital Medicine   [facility]

## 2017-11-28 NOTE — PROGRESS NOTES
Digital Medicine Heart Failure Program consult complete. Patient sitting up in bed. Pleasant and talkative, able to complete assessment without difficulty. NO KIT enrollment with digital scale at home. Explained program details including home monitoring expectations, calling or texting in weights, weekly PharmD calls, and follow up appointment with labs. Addressed pt's questions and concerns to his/her satisfaction. Reviewed blue educational folder (Welcome letter, PharmD contact number, Heart Failure Zones, and program booklet.) Pt verbalized understanding of all discussed and gave consent to enroll in 30-day monitoring program.    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 Recent admit 11/17-11/19 for acute heart failure exacerbation, diuresed with Lasix, sent home with bumex then torsemide. Per Dr PURDY, today the patient was clinically in ADHF (legs 3+ edema), and the Echo reveals pleural effusions and new pericardial effusion.  DDx per Dr PURDY: Worsening diastolic dysfunction due to CAD vs hypertensive heart disease.  She had similar Sx in 2011 when she had PCI. Needs diuresis then LHC if renal function will allow. Pt reports feeling dyspnic even after discharge from her recent admission; believes the shortness of breath has been progressive for the past month. States that her lower extremity edema has been persistent since being admitted. States that she does not urinate much after taking her home torsemide. Reports 2-pillow orthopnea. Denies PND. Denies chest pain or palpitations, but states that she feels a tightness around her chest for which she unhooks her bra for relief although she continues to have  that sensation after removing the bra. States that she watches the sodium and fluid in her diet. Does not perform daily weights. Gained 10-15 lbs in the past month.     Issues: insurance coverage + prescription affordability and poor dietary compliance (reviewed low sodium diet (1,500 mg/day) + fluid restriction (50 oz))     Mrs. Arnold lives alone in [place], [place]. She has family nearby and states they are a good support system. Denies depression or anxiety. PPAT message sent for possible assistance with Plavix Rx. She has a scale at home which she will use for the duration of her enrollment -- will text in or call in weights depending on her need for PharmD assistance. No complaints at time of assessment. She has been sleeping propped up with ~2 pillows. Reports poor sleep due to diuresis, approx 4-5 hours max each night. Low scoring on salt intake assessment, however, we reviewed her dietary intake which revealed she does occasionally eat toast or raisin bread, baked goods, pizza, fast food burgers, turkey sandwiches with mayonnaise, eggs with salt added, canned vegetables, various salad dressings, etc. Reviewed HF Zones sheet, dietary sheets, and sample menu.     Contact information:  [phone] (home)   [phone] (mobile)    Extended Emergency Contact Information  Primary Emergency Contact: Maria Esther Arnold  Address: [address] [place] [address] of [place]  Home Phone: [phone]  Relation: Daughter    Consults placed: PPAT and Social  Readmission Risk: 35%  CHF Kit Name: NO KIT linked to pt with most recent inpatient dry weight (~130lbs).  Follow-up appointment scheduled on 12/7/2017 with labs at 10:30A + HF Clinic at 11:00A with CLAU Hartmann NP.

## 2017-11-28 NOTE — SUBJECTIVE & OBJECTIVE
Past Medical History:   Diagnosis Date    Acute on chronic diastolic congestive heart failure 11/17/2017    Allergy     Anatomical narrow angle glaucoma     Anemia     States diagnosed about a month ago    Aortic atherosclerosis     Arthritis     Cataract     CHF (congestive heart failure)     Chronic kidney disease     Chronic sciatica of left side     Right lower back pain due to sciatica    Coronary artery disease     Depression     Dry eyes     GERD (gastroesophageal reflux disease)     History of colon cancer     Hyperlipidemia     Hypertension     Hypothyroidism     Left ventricular diastolic dysfunction with preserved systolic function     Lupus (systemic lupus erythematosus) 8/17/2012    Malnutrition 5/16/2017    Osteoarthritis of cervical spine 8/17/2012    Osteopenia     PAD (peripheral artery disease)        Past Surgical History:   Procedure Laterality Date    CARDIAC CATHETERIZATION  07/27/2011    x1    CHOLECYSTECTOMY  1999    COLON SURGERY  2000 & 2003    partial removal    COLONOSCOPY N/A 4/14/2016    Procedure: COLONOSCOPY;  Surgeon: Jose Steen MD;  Location: [facility] ([address] [place] [address]);  Service: Endoscopy;  Laterality: N/A;  prep 2 days prior light meals only/clear liquid day before  and 4 dulcolax tabs (5 mgs) at noon    COLONOSCOPY N/A 9/14/2017    Procedure: COLONOSCOPY;  Surgeon: Jose Steen MD;  Location: [facility] ([address] [place] [address]);  Service: Endoscopy;  Laterality: N/A;    EYE SURGERY      HAND SURGERY Bilateral 1996 & 1997    due to carpal tunnel     HERNIA REPAIR      HYSTERECTOMY  1983    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    Arthrotec 50 [diclofenac-misoprostol]      Other reaction(s): Unknown    Bentyl [dicyclomine]      Not effective    Doxycycline      nausea    Imdur [isosorbide mononitrate] Nausea Only    Lomotil [diphenoxylate-atropine]      Stomach cramps, pt vomitted    Lozol [indapamide] Rash    Tramadol Nausea Only       No current facility-administered medications on file prior to encounter.      Current Outpatient Prescriptions on File Prior to Encounter   Medication Sig    acetaminophen (TYLENOL) 500 MG tablet Take 500 mg by mouth every 6 (six) hours as needed for Pain.    amLODIPine (NORVASC) 10 MG tablet Take 1 tablet (10 mg total) by mouth once daily.    aspirin (ECOTRIN) 81 MG EC tablet Take 1 tablet (81 mg total) by mouth once daily. (Patient taking differently: Take 81 mg by mouth once daily. 1 tablet every other day)    cholestyramine (QUESTRAN) 4 gram packet Take 1 packet (4 g total) by mouth 3 (three) times daily with meals.    citalopram (CELEXA) 20 MG tablet Take 1 tablet (20 mg total) by mouth once daily.    hydrALAZINE (APRESOLINE) 10 MG tablet TAKE 2 TABLETS EVERY 12 HOURS    hydroxychloroquine (PLAQUENIL) 200 mg tablet TAKE 1 TABLET TWICE DAILY    latanoprost 0.005 % ophthalmic solution INSTILL 1 DROP INTO BOTH EYES EVERY DAY    levothyroxine (SYNTHROID) 75 MCG tablet Take 1 tablet (75 mcg total) by mouth before breakfast.    metoprolol succinate (TOPROL-XL) 50 MG 24 hr tablet Take 1 tablet (50 mg total) by mouth once daily.    rosuvastatin (CRESTOR) 40 MG Tab Take 1 tablet (40 mg total) by mouth every evening.    torsemide (DEMADEX) 20 MG Tab Take 1 tablet (20 mg total) by mouth once daily.    VITAMIN D2 50,000 unit capsule TAKE 1 CAPSULE (50,000 UNITS TOTAL) EVERY 30 DAYS    ammonium lactate 12 % Crea Apply 1  application topically 2 (two) times daily as needed.    betamethasone dipropionate (DIPROLENE) 0.05 % cream Apply topically 2 (two) times daily as needed.    butalbital-aspirin-caffeine -40 mg (FIORINAL) -40 mg Cap Take 1 capsule by mouth every 6 (six) hours as needed.     clopidogrel (PLAVIX) 75 mg tablet Take 1 tablet (75 mg total) by mouth once daily.    conjugated estrogens (PREMARIN) vaginal cream Use 1g nightly for two weeks, then 1g twice per week.    fexofenadine (ALLEGRA) 180 MG tablet Take 1 tablet (180 mg total) by mouth once daily. (Patient taking differently: Take 180 mg by mouth as needed. )    hyoscyamine (LEVSIN/SL) 0.125 mg Subl Place 1 tablet (0.125 mg total) under the tongue 2 (two) times daily as needed.    ipratropium (ATROVENT) 0.03 % nasal spray 2 sprays by Nasal route 2 (two) times daily as needed for Rhinitis.    irbesartan (AVAPRO) 150 MG tablet Take 1 tablet (150 mg total) by mouth every evening.    meclizine (ANTIVERT) 12.5 mg tablet Take 1 tablet (12.5 mg total) by mouth 3 (three) times daily as needed.    multivitamin capsule Take 1 capsule by mouth once daily.    nitroGLYCERIN (NITROSTAT) 0.4 MG SL tablet PLACE 1 TABLET UNDER THE TONGUE EVERY 5 MINUTES FOR 3 DOSES --IF NOT RELIEVED GO TO THE ER    nitroGLYCERIN (NITROSTAT) 0.4 MG SL tablet Place 1 tablet (0.4 mg total) under the tongue every 5 (five) minutes as needed for Chest pain.    nystatin (MYCOSTATIN) ointment Apply topically 2 (two) times daily.    nystatin-triamcinolone (MYCOLOG II) cream Apply topically 4 (four) times daily.    omeprazole (PRILOSEC) 40 MG capsule Take 1 capsule (40 mg total) by mouth once daily.    TENS units Sravanthi 1 application by Misc.(Non-Drug; Combo Route) route daily as needed (pain).    triamcinolone acetonide 0.1% (KENALOG) 0.1 % paste PLACE ONTO TEETH TWICE DAILY (Patient taking differently: PLACE ONTO TEETH PRN)     Family History     Problem Relation (Age of Onset)     Diabetes Maternal Grandmother, Son, Maternal Aunt    Heart attack Mother    Heart disease Father    Hypertension Mother, Father, Sister, Brother    Kidney disease Brother    Kidney failure Brother    No Known Problems Daughter, Maternal Grandfather, Paternal Grandmother, Paternal Grandfather        Social History Main Topics    Smoking status: Former Smoker     Packs/day: 1.50     Years: 30.00     Types: Cigarettes     Start date: 1955     Quit date: 3/13/1985    Smokeless tobacco: Never Used    Alcohol use No    Drug use: No    Sexual activity: Not Currently     Partners: Male     Birth control/ protection: Abstinence     Review of Systems   Constitutional: Positive for activity change, fatigue and unexpected weight change. Negative for fever.   Respiratory: Positive for chest tightness and shortness of breath. Negative for cough and wheezing.    Cardiovascular: Positive for leg swelling. Negative for chest pain and palpitations.   All other systems reviewed and are negative.    Objective:     Vital Signs (Most Recent):  Temp: 98.3 °F (36.8 °C) (11/27/17 1733)  Pulse: 68 (11/27/17 1733)  Resp: 18 (11/27/17 1733)  BP: (!) 181/84 (11/27/17 1733)  SpO2: (!) 94 % (11/27/17 1733) Vital Signs (24h Range):  Temp:  [98.3 °F (36.8 °C)] 98.3 °F (36.8 °C)  Pulse:  [66-68] 68  Resp:  [18] 18  SpO2:  [94 %-96 %] 94 %  BP: (154-181)/(69-84) 181/84        Body mass index is 22.43 kg/m².    Physical Exam   Constitutional: She is oriented to person, place, and time. She appears well-nourished. No distress.   HENT:   Head: Normocephalic and atraumatic.   Nose: Nose normal.   Mouth/Throat: Oropharynx is clear and moist. No oropharyngeal exudate.   Eyes: Conjunctivae and EOM are normal. Pupils are equal, round, and reactive to light. Right eye exhibits no discharge. Left eye exhibits no discharge. No scleral icterus.   Neck: Neck supple. No JVD present. No tracheal deviation present. No thyromegaly present.   Cardiovascular:  Normal rate, regular rhythm and intact distal pulses.  Exam reveals no friction rub.    No murmur heard.  Systolic ejection murmur heart throughout, best at 2nd right intercostal space   Pulmonary/Chest: Effort normal and breath sounds normal. No stridor. No respiratory distress. She has no wheezes. She has no rales (bibasilar). She exhibits no tenderness.   Abdominal: Soft. Bowel sounds are normal. She exhibits no distension and no mass. There is no tenderness. There is no rebound and no guarding.   Musculoskeletal: She exhibits edema (3+ pitting to the knees). She exhibits no tenderness or deformity.   Lymphadenopathy:     She has no cervical adenopathy.   Neurological: She is alert and oriented to person, place, and time. No cranial nerve deficit. She exhibits normal muscle tone. Coordination normal.   Skin: Skin is warm and dry. No rash noted. She is not diaphoretic. No erythema. No pallor.   Psychiatric: She has a normal mood and affect. Her behavior is normal. Judgment and thought content normal.   Vitals reviewed.       Significant Labs:   CBC:   Recent Labs  Lab 11/27/17  1200   WBC 4.75   HGB 9.7*   HCT 30.9*        CMP:   Recent Labs  Lab 11/27/17  1200      K 3.6      CO2 20*   GLU 63*   BUN 43*   CREATININE 2.0*   CALCIUM 8.9   ANIONGAP 12   EGFRNONAA 24.6*     CXR & EKG ordered, pending

## 2017-11-28 NOTE — PLAN OF CARE
Problem: Patient Care Overview  Goal: Plan of Care Review  Outcome: Ongoing (interventions implemented as appropriate)  Pt free of falls, injury this shift. POC reviewed with pt at bedside, verbalized understanding. Lasix gtt infusing per MD order, see MAR. K 3.4, replaced with 40 mEq. Pt NPO for possible LHC today, pending kidney function labs this AM. Pt denies SOB, CP. NAD noted. Will continue to monitor.

## 2017-11-28 NOTE — PLAN OF CARE
11/28/17 0923   Discharge Assessment   Assessment Type Discharge Planning Assessment   Confirmed/corrected address and phone number on facesheet? Yes   Assessment information obtained from? Patient;Medical Record   Expected Length of Stay (days) 4   Communicated expected length of stay with patient/caregiver yes   Prior to hospitilization cognitive status: Alert/Oriented   Prior to hospitalization functional status: Independent   Current cognitive status: Alert/Oriented   Current Functional Status: Independent   Lives With alone   Able to Return to Prior Arrangements yes   Is patient able to care for self after discharge? Yes   Patient's perception of discharge disposition home or selfcare   Readmission Within The Last 30 Days previous discharge plan unsuccessful   If yes, most recent facility name: [facility]   Patient currently being followed by outpatient case management? No   Patient currently receives any other outside agency services? No   Equipment Currently Used at Home none   Do you have any problems affording any of your prescribed medications? No   Is the patient taking medications as prescribed? yes   Does the patient have transportation home? Yes   Transportation Available family or friend will provide  (son)   Does the patient receive services at the [facility]? No   Discharge Plan A Home   Discharge Plan B Home   Readmission Questionnaire   At the time of your discharge, did someone talk to you about what your health problems were? Yes   At the time of discharge, did someone talk to you about what to watch out for regarding worsening of your health problem? Yes   At the time of discharge, did someone talk to you about what to do if you experienced worsening of your health problem? Yes   At the time of discharge, did someone talk to you about which medication to take when you left the hospital and which ones to stop taking? Yes   At the time of discharge, did someone talk to you about when and  where to follow up with a doctor after you left the hospital? Yes   How often do you need to have someone help you when you read instructions, pamphlets, or other written material from your doctor or pharmacy? Sometimes   Do you have problems taking your medications as prescribed? No   Do you have any problems affording any of  your prescribed medications? No   Do you have problems obtaining/receiving your medications? No   Does the patient have transportation to healthcare appointments? Yes   Living Arrangements house   Does the patient have family/friends to help with healtcare needs after discharge? yes   Does your caregiver provide all the help you need? Yes   Are you currently feeling confused? No   Are you currently having problems thinking? No   Are you currently having memory problems? No   Have you felt down, depressed, or hopeless? 0   Have you felt little interest or pleasure in doing things? 0   In the last 7 days, my sleep quality was: poor   Readmitted with CHF. Lives alone and is independent in her ADLs. Plan is to DC home. No DC needs identified.

## 2017-11-28 NOTE — PROGRESS NOTES
Notified Dr. Ly of patient's Magnesium level and elevated blood pressure. Questioned the clonidine order for blood pressure. Stated she will put in replacements and that she will be canceling the clonidine order. Will continue to monitor.

## 2017-11-28 NOTE — PLAN OF CARE
Problem: Patient Care Overview  Goal: Plan of Care Review  Outcome: Ongoing (interventions implemented as appropriate)  Plan of care discussed with patient. Patient is free of fall/trauma/injury. Denies CP, SOB, or pain/discomfort. Lasix gtt continued per order.  Magnesium and Potassium replaced. Blood pressure medications adjusted to better control blood pressure. Plan to do heart Cath as an outpatient procedure. All questions addressed. Will continue to monitor

## 2017-11-28 NOTE — PROGRESS NOTES
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 Recent admit 11/17-11/19 for acute heart failure exacerbation, diuresed with Lasix, sent home with bumex then torsemide. Per Dr PURDY, today the patient was clinically in ADHF (legs 3+ edema), and the Echo reveals pleural effusions and new pericardial effusion.  DDx per Dr PURDY: Worsening diastolic dysfunction due to CAD vs hypertensive heart disease.  She had similar Sx in 2011 when she had PCI. Needs diuresis then LHC if renal function will allow. Pt reports feeling dyspnic even after discharge from her recent admission; believes the shortness of breath has been progressive for the past month. States that her lower extremity edema has been persistent since being admitted. States that she does not urinate much after taking her home torsemide. Reports 2-pillow orthopnea. Denies PND. Denies chest pain or palpitations, but states that she feels a tightness around her chest for which she unhooks her bra for relief although she continues to have that sensation after removing the bra. States that she watches the sodium and fluid in her diet. Does not perform daily weights. Gained 10-15 lbs in the past month.     Hospital Course: Admitted to [facility] [place]    Interval History: Diuresing well on Lasix gtt at 10, with net -1314cc and weight decreased to 129 lbs (from 132), PEDRO much improved    Review of Systems:  Pain Scale: 0 /10   Constitutional: no fever or chills  Respiratory: no cough or shortness of breath  Cardiovascular: no  chest pain or palpitations  Gastrointestinal: no nausea or vomiting, no abdominal pain or change in bowel habits  Genitourinary: no hematuria or dysuria  Integument/Breast: no rash or pruritis  Hematologic/Lymphatic: no easy bruising or lymphadenopathy  Musculoskeletal: no arthralgias or myalgias  Neurological: no seizures or tremors  Behavioral/Psych: no depression or anxiety    OBJECTIVE:     Vital Signs Range (Last 24H):  Temp:  [96.9 °F (36.1 °C)-98.5 °F (36.9 °C)]   Pulse:  [65-72]   Resp:  [16-18]   BP: (154-181)/(69-84)   SpO2:  [91 %-96 %]     I & O (Last 24H):  Intake/Output Summary (Last 24 hours) at 11/28/17 0842  Last data filed at 11/28/17 0616   Gross per 24 hour   Intake           635.83 ml   Output             1950 ml   Net         -1314.17 ml       Physical Exam:  General appearance: no distress,  Mental status: Alert and oriented x 3  HEENT:  conjunctivae/corneas clear, PERRL  Neck: supple, thyroid not enlarged, +JVD  Pulm:   normal respiratory effort, CTA B, no c/w/r  Card: RRR, S1, S2 normal, no murmur, click, rub or gallop  Abd: soft, NT, ND, BS present; no masses, no organomegaly  Ext: 1+ pitting edema to B LE to knees  Pulses: 2+, symmetric  Skin: color, texture, turgor normal. No rashes or lesions  Neuro: CN II-XII grossly intact, no focal numbness or weakness, normal strength and tone     Diagnostic Results:  Labs reviewed    Recent Results (from the past 24 hour(s))   Basic metabolic panel    Collection Time: 11/27/17 12:00 PM   Result Value Ref Range    Sodium 141 136 - 145 mmol/L    Potassium 3.6 3.5 - 5.1 mmol/L    Chloride 109 95 - 110 mmol/L    CO2 20 (L) 23 - 29 mmol/L    Glucose 63 (L) 70 - 110 mg/dL    BUN, Bld 43 (H) 8 - 23 mg/dL    Creatinine 2.0 (H) 0.5 - 1.4 mg/dL    Calcium 8.9 8.7 - 10.5 mg/dL    Anion Gap 12 8 - 16 mmol/L    eGFR if African American 28.3 (A) >60 mL/min/1.73 m^2    eGFR if non  24.6 (A) >60 mL/min/1.73 m^2   CBC Without Differential     Collection Time: 11/27/17 12:00 PM   Result Value Ref Range    WBC 4.75 3.90 - 12.70 K/uL    RBC 3.83 (L) 4.00 - 5.40 M/uL    Hemoglobin 9.7 (L) 12.0 - 16.0 g/dL    Hematocrit 30.9 (L) 37.0 - 48.5 %    MCV 81 (L) 82 - 98 fL    MCH 25.3 (L) 27.0 - 31.0 pg    MCHC 31.4 (L) 32.0 - 36.0 g/dL    RDW 14.6 (H) 11.5 - 14.5 %    Platelets 217 150 - 350 K/uL    MPV 10.4 9.2 - 12.9 fL   Protime-INR    Collection Time: 11/27/17 12:00 PM   Result Value Ref Range    Prothrombin Time 11.0 9.0 - 12.5 sec    INR 1.0 0.8 - 1.2   APTT    Collection Time: 11/27/17 12:00 PM   Result Value Ref Range    aPTT 23.9 21.0 - 32.0 sec   2D Echo w/ Color Flow Doppler    Collection Time: 11/27/17  3:15 PM   Result Value Ref Range    EF 55 55 - 65    Mitral Valve Regurgitation MILD     Diastolic Dysfunction Yes (A)     Aortic Valve Regurgitation TRIVIAL TO MILD     Aortic Valve Stenosis MODERATE (A)     Est. PA Systolic Pressure 68.29 (A)     Pericardial Effusion SMALL (A)     Mitral Valve Mobility NORMAL     Tricuspid Valve Regurgitation MILD    Basic metabolic panel    Collection Time: 11/27/17  7:01 PM   Result Value Ref Range    Sodium 142 136 - 145 mmol/L    Potassium 3.4 (L) 3.5 - 5.1 mmol/L    Chloride 111 (H) 95 - 110 mmol/L    CO2 22 (L) 23 - 29 mmol/L    Glucose 88 70 - 110 mg/dL    BUN, Bld 45 (H) 8 - 23 mg/dL    Creatinine 2.1 (H) 0.5 - 1.4 mg/dL    Calcium 8.3 (L) 8.7 - 10.5 mg/dL    Anion Gap 9 8 - 16 mmol/L    eGFR if African American 26.7 (A) >60 mL/min/1.73 m^2    eGFR if non  23.2 (A) >60 mL/min/1.73 m^2   Troponin I    Collection Time: 11/27/17  7:01 PM   Result Value Ref Range    Troponin I 0.097 (H) 0.000 - 0.026 ng/mL   Troponin I    Collection Time: 11/28/17 12:37 AM   Result Value Ref Range    Troponin I 0.097 (H) 0.000 - 0.026 ng/mL   CBC auto differential    Collection Time: 11/28/17  4:33 AM   Result Value Ref Range    WBC 4.68 3.90 - 12.70 K/uL    RBC 3.52 (L) 4.00 - 5.40 M/uL    Hemoglobin 9.1 (L) 12.0  - 16.0 g/dL    Hematocrit 28.5 (L) 37.0 - 48.5 %    MCV 81 (L) 82 - 98 fL    MCH 25.9 (L) 27.0 - 31.0 pg    MCHC 31.9 (L) 32.0 - 36.0 g/dL    RDW 14.6 (H) 11.5 - 14.5 %    Platelets 215 150 - 350 K/uL    MPV 10.5 9.2 - 12.9 fL    Immature Granulocytes 0.2 0.0 - 0.5 %    Gran # 2.6 1.8 - 7.7 K/uL    Immature Grans (Abs) 0.01 0.00 - 0.04 K/uL    Lymph # 1.1 1.0 - 4.8 K/uL    Mono # 0.8 0.3 - 1.0 K/uL    Eos # 0.1 0.0 - 0.5 K/uL    Baso # 0.03 0.00 - 0.20 K/uL    nRBC 0 0 /100 WBC    Gran% 55.5 38.0 - 73.0 %    Lymph% 24.4 18.0 - 48.0 %    Mono% 16.7 (H) 4.0 - 15.0 %    Eosinophil% 2.6 0.0 - 8.0 %    Basophil% 0.6 0.0 - 1.9 %    Differential Method Automated    Magnesium - if not done in ED    Collection Time: 11/28/17  4:33 AM   Result Value Ref Range    Magnesium 1.5 (L) 1.6 - 2.6 mg/dL   Phosphorus - if not done in ED    Collection Time: 11/28/17  4:33 AM   Result Value Ref Range    Phosphorus 4.2 2.7 - 4.5 mg/dL   Comprehensive metabolic panel - if not done in ED    Collection Time: 11/28/17  4:33 AM   Result Value Ref Range    Sodium 141 136 - 145 mmol/L    Potassium 3.8 3.5 - 5.1 mmol/L    Chloride 110 95 - 110 mmol/L    CO2 21 (L) 23 - 29 mmol/L    Glucose 67 (L) 70 - 110 mg/dL    BUN, Bld 47 (H) 8 - 23 mg/dL    Creatinine 1.9 (H) 0.5 - 1.4 mg/dL    Calcium 8.4 (L) 8.7 - 10.5 mg/dL    Total Protein 6.8 6.0 - 8.4 g/dL    Albumin 2.8 (L) 3.5 - 5.2 g/dL    Total Bilirubin 0.2 0.1 - 1.0 mg/dL    Alkaline Phosphatase 82 55 - 135 U/L    AST 40 10 - 40 U/L    ALT 18 10 - 44 U/L    Anion Gap 10 8 - 16 mmol/L    eGFR if African American 30.1 (A) >60 mL/min/1.73 m^2    eGFR if non  26.1 (A) >60 mL/min/1.73 m^2   Brain natriuretic peptide    Collection Time: 11/28/17  4:33 AM   Result Value Ref Range    BNP 2,331 (H) 0 - 99 pg/mL     11/28 CXR PA/lat: The cardiomediastinal silhouette is enlarged, similar in configuration as compared to the previous exam.  There is obscuration of the left costophrenic angle  suggesting effusion.  The trachea is midline.  The lungs are symmetrically expanded bilaterally with patchy increased interstitial and parenchymal attenuation bilaterally, more focal overlying the right lower lung zone, may reflect atelectasis and pleural fluid, developing consolidation however is not excluded. No large focal consolidation seen.  There is no pneumothorax.  The osseous structures are remarkable for degenerative changes.  Post surgical changes overlie the abdomen.      ASSESSMENT/PLAN:     Acute on chronic diastolic CHF - dyspnea, orthopnea, edema.  2D Echo with new small pericardial effusion, new pleural effusions, also showed her known grade 2 diastolic dysfunction, pHTN, moderate AS. BNP 2331. Trop 0.097, likely strain from acute CHF.   - possibly not absorbing oral diuretics due to gut edema  - lasix IV 80 x 1, then gtt at 10 ml/hr  - continue Lasix gtt at 10   - BP control   - sodium/fluid restriction  - strict I/os, daily weights  - tele  - no cardiology consult for now, but will consult co-management if warranted  - I discussed with Dr BHAKTI Ly, plan to diurese her and send her home, then Interventional will bring her in for elective [facility]  2D Echo with CFD   1 - Normal left ventricular systolic function (EF 55-60%).     2 - Impaired LV relaxation, elevated LAP (grade 2 diastolic dysfunction).     3 - Normal right ventricular systolic function .     4 - Pulmonary hypertension. The estimated PA systolic pressure is 68 mmHg.     5 - Trivial to mild aortic regurgitation.     6 - Severe left atrial enlargement.     7 - No wall motion abnormalities.     8 - Mild mitral regurgitation.     9 - Mild tricuspid regurgitation.     10 - Mild pulmonic regurgitation.     11 - Small pericardial effusion.     12 - Left pleural effusion.     13 - Concentric hypertrophy.     14 - Moderate aortic stenosis, PABLITO = 0.93 cm2, peak velocity = 3.5 m/s, mean gradient = 28 mmHg.     L pleural effusion  - medical diuresis  for now  - follow    Abnormal CXR - L pleural effusion vs developing consolidation  - monitor closely for signs of PNA  - start Incentive spirometry for atelectasis  - medical diuresis as above  - consider CT chest inpt vs outpt     CKD-3 in solitary kidney due to kidney donation '85- at baseline  - monitor  - renally-dose all meds  - avoid nephrotoxic meds    Renovascular HTN - SBP persistently elevated 180s-180s  - Home norvasc 10   - Home irbesartan 150 qhs, consider increase to max 300  - change BB to coreg 12.5 (from Toprol 50) for optimal antihypertensive properties  - continue home hydralazine 20 BID, consider increase    CAD with KIT to LAD  - Home ASA 81  - Home Plavix 75  - Home irbesartan 150 qhs  - home Toprol XL 50  - home rosuva 40    HLD   - home rosuva 40    SLE - seen in Rheum clinic by Dr Ramirez yesterday, but she was seen prior to Echo result  - home plaquenil 200  - ask Dr Ramirez if still ok to continue plaquenil     Hypothyroidism   - home synthroid 75  - f/u TSH    Mood disorder  - home celexa 20    Mod malnutrition - Alb 2.8 start MVI - monitor    HypoMg - replace IV    HypoK - repace PO    Prophylaxis- lovenox 30    Advance directives - full code    Post-acute care- pending clinical condition and diuresis, home in next 2-3 days, with elective LHC by Dr BHAKTI Ly next week    Time spent in care of patient: 45 mins    Christy Ly MD  Hospital Medicine Staff

## 2017-11-28 NOTE — ASSESSMENT & PLAN NOTE
-presents w/ dyspnea, orthopnea, edema w/ concern for HF exacerbation  -outpatient TTE w/ grade 2 diastolic dysfunction, pHTN, moderate AS, small pericardial effusion  -likely w/ gut edema & not absorbing oral diuretics  -cxr, ekg, bnp, troponin pending  -start iv lasix 80 x 1, then gtt at 10 ml/hr  -bp control  -sodium/fluid restriction  -strict I/os, daily weights  -cards consult

## 2017-11-29 ENCOUNTER — TELEPHONE (OUTPATIENT)
Dept: PULMONOLOGY | Facility: CLINIC | Age: 72
End: 2017-11-29

## 2017-11-29 LAB
ABO + RH BLD: NORMAL
ALBUMIN SERPL BCP-MCNC: 2.6 G/DL
ALP SERPL-CCNC: 74 U/L
ALT SERPL W/O P-5'-P-CCNC: 16 U/L
ANION GAP SERPL CALC-SCNC: 8 MMOL/L
AST SERPL-CCNC: 36 U/L
BASOPHILS # BLD AUTO: 0.03 K/UL
BASOPHILS NFR BLD: 0.7 %
BILIRUB SERPL-MCNC: 0.2 MG/DL
BLD GP AB SCN CELLS X3 SERPL QL: NORMAL
BUN SERPL-MCNC: 45 MG/DL
CALCIUM SERPL-MCNC: 8.4 MG/DL
CHLORIDE SERPL-SCNC: 109 MMOL/L
CO2 SERPL-SCNC: 23 MMOL/L
CREAT SERPL-MCNC: 2.2 MG/DL
DIFFERENTIAL METHOD: ABNORMAL
EOSINOPHIL # BLD AUTO: 0.1 K/UL
EOSINOPHIL NFR BLD: 2.9 %
ERYTHROCYTE [DISTWIDTH] IN BLOOD BY AUTOMATED COUNT: 14.6 %
EST. GFR  (AFRICAN AMERICAN): 25.2 ML/MIN/1.73 M^2
EST. GFR  (NON AFRICAN AMERICAN): 21.9 ML/MIN/1.73 M^2
FERRITIN SERPL-MCNC: 202 NG/ML
GLUCOSE SERPL-MCNC: 62 MG/DL
HCT VFR BLD AUTO: 27.1 %
HGB BLD-MCNC: 8.4 G/DL
IMM GRANULOCYTES # BLD AUTO: 0.01 K/UL
IMM GRANULOCYTES NFR BLD AUTO: 0.2 %
IRON SERPL-MCNC: 31 UG/DL
LYMPHOCYTES # BLD AUTO: 1.1 K/UL
LYMPHOCYTES NFR BLD: 26 %
MAGNESIUM SERPL-MCNC: 1.6 MG/DL
MCH RBC QN AUTO: 25.1 PG
MCHC RBC AUTO-ENTMCNC: 31 G/DL
MCV RBC AUTO: 81 FL
MONOCYTES # BLD AUTO: 0.8 K/UL
MONOCYTES NFR BLD: 18.6 %
NEUTROPHILS # BLD AUTO: 2.1 K/UL
NEUTROPHILS NFR BLD: 51.6 %
NRBC BLD-RTO: 0 /100 WBC
PHOSPHATE SERPL-MCNC: 4.2 MG/DL
PLATELET # BLD AUTO: 198 K/UL
PMV BLD AUTO: 10.6 FL
POTASSIUM SERPL-SCNC: 3.7 MMOL/L
PROT SERPL-MCNC: 6.4 G/DL
RBC # BLD AUTO: 3.35 M/UL
RETICS/RBC NFR AUTO: 1.2 %
SATURATED IRON: 10 %
SODIUM SERPL-SCNC: 140 MMOL/L
TOTAL IRON BINDING CAPACITY: 317 UG/DL
TRANSFERRIN SERPL-MCNC: 214 MG/DL
VIT B12 SERPL-MCNC: 537 PG/ML
WBC # BLD AUTO: 4.08 K/UL

## 2017-11-29 PROCEDURE — 99233 SBSQ HOSP IP/OBS HIGH 50: CPT | Mod: ,,, | Performed by: HOSPITALIST

## 2017-11-29 PROCEDURE — 36415 COLL VENOUS BLD VENIPUNCTURE: CPT

## 2017-11-29 PROCEDURE — 85045 AUTOMATED RETICULOCYTE COUNT: CPT

## 2017-11-29 PROCEDURE — 25000003 PHARM REV CODE 250: Performed by: HOSPITALIST

## 2017-11-29 PROCEDURE — 20600001 HC STEP DOWN PRIVATE ROOM

## 2017-11-29 PROCEDURE — 82607 VITAMIN B-12: CPT

## 2017-11-29 PROCEDURE — 86920 COMPATIBILITY TEST SPIN: CPT

## 2017-11-29 PROCEDURE — 85025 COMPLETE CBC W/AUTO DIFF WBC: CPT

## 2017-11-29 PROCEDURE — 86900 BLOOD TYPING SEROLOGIC ABO: CPT

## 2017-11-29 PROCEDURE — 83735 ASSAY OF MAGNESIUM: CPT

## 2017-11-29 PROCEDURE — 84100 ASSAY OF PHOSPHORUS: CPT

## 2017-11-29 PROCEDURE — 63600175 PHARM REV CODE 636 W HCPCS: Performed by: HOSPITALIST

## 2017-11-29 PROCEDURE — 82728 ASSAY OF FERRITIN: CPT

## 2017-11-29 PROCEDURE — 83540 ASSAY OF IRON: CPT

## 2017-11-29 PROCEDURE — 86850 RBC ANTIBODY SCREEN: CPT

## 2017-11-29 PROCEDURE — 80053 COMPREHEN METABOLIC PANEL: CPT

## 2017-11-29 RX ORDER — CHOLESTYRAMINE 4 G/9G
1 POWDER, FOR SUSPENSION ORAL
Status: DISCONTINUED | OUTPATIENT
Start: 2017-11-29 | End: 2017-11-30 | Stop reason: HOSPADM

## 2017-11-29 RX ORDER — HYDROCODONE BITARTRATE AND ACETAMINOPHEN 500; 5 MG/1; MG/1
TABLET ORAL
Status: DISCONTINUED | OUTPATIENT
Start: 2017-11-29 | End: 2017-11-30 | Stop reason: HOSPADM

## 2017-11-29 RX ORDER — MAGNESIUM SULFATE HEPTAHYDRATE 40 MG/ML
2 INJECTION, SOLUTION INTRAVENOUS ONCE
Status: COMPLETED | OUTPATIENT
Start: 2017-11-29 | End: 2017-11-29

## 2017-11-29 RX ORDER — ACETAMINOPHEN 325 MG/1
650 TABLET ORAL EVERY 6 HOURS PRN
Status: DISCONTINUED | OUTPATIENT
Start: 2017-11-29 | End: 2017-11-30 | Stop reason: HOSPADM

## 2017-11-29 RX ORDER — POTASSIUM CHLORIDE 20 MEQ/1
40 TABLET, EXTENDED RELEASE ORAL ONCE
Status: COMPLETED | OUTPATIENT
Start: 2017-11-29 | End: 2017-11-29

## 2017-11-29 RX ADMIN — LATANOPROST 1 DROP: 50 SOLUTION OPHTHALMIC at 09:11

## 2017-11-29 RX ADMIN — CARVEDILOL 12.5 MG: 6.25 TABLET, FILM COATED ORAL at 09:11

## 2017-11-29 RX ADMIN — HYDRALAZINE HYDROCHLORIDE 20 MG: 10 TABLET, FILM COATED ORAL at 09:11

## 2017-11-29 RX ADMIN — ENOXAPARIN SODIUM 30 MG: 100 INJECTION SUBCUTANEOUS at 04:11

## 2017-11-29 RX ADMIN — IRBESARTAN 150 MG: 150 TABLET ORAL at 09:11

## 2017-11-29 RX ADMIN — THERA TABS 1 TABLET: TAB at 09:11

## 2017-11-29 RX ADMIN — CARVEDILOL 12.5 MG: 6.25 TABLET, FILM COATED ORAL at 04:11

## 2017-11-29 RX ADMIN — LEVOTHYROXINE SODIUM 75 MCG: 50 TABLET ORAL at 06:11

## 2017-11-29 RX ADMIN — POTASSIUM CHLORIDE 40 MEQ: 1500 TABLET, EXTENDED RELEASE ORAL at 11:11

## 2017-11-29 RX ADMIN — CHOLESTYRAMINE 4 G: 4 POWDER, FOR SUSPENSION ORAL at 04:11

## 2017-11-29 RX ADMIN — CLOPIDOGREL 75 MG: 75 TABLET, FILM COATED ORAL at 09:11

## 2017-11-29 RX ADMIN — AMLODIPINE BESYLATE 10 MG: 10 TABLET ORAL at 09:11

## 2017-11-29 RX ADMIN — ASPIRIN 81 MG: 81 TABLET, COATED ORAL at 09:11

## 2017-11-29 RX ADMIN — CITALOPRAM HYDROBROMIDE 20 MG: 20 TABLET ORAL at 09:11

## 2017-11-29 RX ADMIN — ACETAMINOPHEN 650 MG: 325 TABLET ORAL at 01:11

## 2017-11-29 RX ADMIN — HYDROXYCHLOROQUINE SULFATE 200 MG: 200 TABLET, FILM COATED ORAL at 09:11

## 2017-11-29 RX ADMIN — ROSUVASTATIN CALCIUM 40 MG: 20 TABLET, FILM COATED ORAL at 09:11

## 2017-11-29 RX ADMIN — MAGNESIUM SULFATE IN WATER 2 G: 40 INJECTION, SOLUTION INTRAVENOUS at 11:11

## 2017-11-29 RX ADMIN — FUROSEMIDE 10 MG/HR: 10 INJECTION, SOLUTION INTRAVENOUS at 05:11

## 2017-11-29 NOTE — PROGRESS NOTES
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 Recent admit 11/17-11/19 for acute heart failure exacerbation, diuresed with Lasix, sent home with bumex then torsemide. Per Dr PURDY, today the patient was clinically in ADHF (legs 3+ edema), and the Echo reveals pleural effusions and new pericardial effusion.  DDx per Dr PURDY: Worsening diastolic dysfunction due to CAD vs hypertensive heart disease.  She had similar Sx in 2011 when she had PCI. Needs diuresis then LHC if renal function will allow. Pt reports feeling dyspnic even after discharge from her recent admission; believes the shortness of breath has been progressive for the past month. States that her lower extremity edema has been persistent since being admitted. States that she does not urinate much after taking her home torsemide. Reports 2-pillow orthopnea. Denies PND. Denies chest pain or palpitations, but states that she feels a tightness around her chest for which she unhooks her bra for relief although she continues to have that sensation after removing the bra. States that she watches the sodium and fluid in her diet. Does not perform daily weights. Gained 10-15 lbs in the past month.     Hospital Course: Admitted to [facility] [place]. Diuresing well on Lasix gtt at 10, with net -1314cc and weight decreased to 129 lbs (from 132), PEDRO much improved    Interval History: Diuresing well but noted to have decreasing H/H, discussed with cardiology, anemia and possible tranfusion prior to d/c home. Recent BMBx    Review  of Systems:  Pain Scale: 0 /10   Constitutional: no fever or chills  Respiratory: no cough or shortness of breath  Cardiovascular: no chest pain or palpitations  Gastrointestinal: no nausea or vomiting, no abdominal pain or change in bowel habits  Genitourinary: no hematuria or dysuria  Integument/Breast: no rash or pruritis  Hematologic/Lymphatic: no easy bruising or lymphadenopathy  Musculoskeletal: no arthralgias or myalgias  Neurological: no seizures or tremors  Behavioral/Psych: no depression or anxiety    OBJECTIVE:     Vital Signs Range (Last 24H):  Temp:  [97.5 °F (36.4 °C)-98.7 °F (37.1 °C)]   Pulse:  [64-72]   Resp:  [16-18]   BP: (136-176)/(65-79)   SpO2:  [91 %-94 %]     I & O (Last 24H):    Intake/Output Summary (Last 24 hours) at 11/29/17 1013  Last data filed at 11/29/17 0500   Gross per 24 hour   Intake           660.83 ml   Output             1650 ml   Net          -989.17 ml       Physical Exam:  General appearance: no distress,  Mental status: Alert and oriented x 3  HEENT:  conjunctivae/corneas clear, PERRL  Neck: supple, thyroid not enlarged, +JVD  Pulm:   normal respiratory effort, CTA B, no c/w/r  Card: RRR, S1, S2 normal, no murmur, click, rub or gallop  Abd: soft, NT, ND, BS present; no masses, no organomegaly  Ext: 1+ pitting edema to B LE to knees  Pulses: 2+, symmetric  Skin: color, texture, turgor normal. No rashes or lesions  Neuro: CN II-XII grossly intact, no focal numbness or weakness, normal strength and tone     Diagnostic Results:  Labs reviewed    Recent Results (from the past 24 hour(s))   CBC auto differential    Collection Time: 11/29/17  4:59 AM   Result Value Ref Range    WBC 4.08 3.90 - 12.70 K/uL    RBC 3.35 (L) 4.00 - 5.40 M/uL    Hemoglobin 8.4 (L) 12.0 - 16.0 g/dL    Hematocrit 27.1 (L) 37.0 - 48.5 %    MCV 81 (L) 82 - 98 fL    MCH 25.1 (L) 27.0 - 31.0 pg    MCHC 31.0 (L) 32.0 - 36.0 g/dL    RDW 14.6 (H) 11.5 - 14.5 %    Platelets 198 150 - 350 K/uL    MPV 10.6 9.2 -  12.9 fL    Immature Granulocytes 0.2 0.0 - 0.5 %    Gran # 2.1 1.8 - 7.7 K/uL    Immature Grans (Abs) 0.01 0.00 - 0.04 K/uL    Lymph # 1.1 1.0 - 4.8 K/uL    Mono # 0.8 0.3 - 1.0 K/uL    Eos # 0.1 0.0 - 0.5 K/uL    Baso # 0.03 0.00 - 0.20 K/uL    nRBC 0 0 /100 WBC    Gran% 51.6 38.0 - 73.0 %    Lymph% 26.0 18.0 - 48.0 %    Mono% 18.6 (H) 4.0 - 15.0 %    Eosinophil% 2.9 0.0 - 8.0 %    Basophil% 0.7 0.0 - 1.9 %    Differential Method Automated    Magnesium - if not done in ED    Collection Time: 11/29/17  4:59 AM   Result Value Ref Range    Magnesium 1.6 1.6 - 2.6 mg/dL   Phosphorus - if not done in ED    Collection Time: 11/29/17  4:59 AM   Result Value Ref Range    Phosphorus 4.2 2.7 - 4.5 mg/dL   Comprehensive metabolic panel - if not done in ED    Collection Time: 11/29/17  4:59 AM   Result Value Ref Range    Sodium 140 136 - 145 mmol/L    Potassium 3.7 3.5 - 5.1 mmol/L    Chloride 109 95 - 110 mmol/L    CO2 23 23 - 29 mmol/L    Glucose 62 (L) 70 - 110 mg/dL    BUN, Bld 45 (H) 8 - 23 mg/dL    Creatinine 2.2 (H) 0.5 - 1.4 mg/dL    Calcium 8.4 (L) 8.7 - 10.5 mg/dL    Total Protein 6.4 6.0 - 8.4 g/dL    Albumin 2.6 (L) 3.5 - 5.2 g/dL    Total Bilirubin 0.2 0.1 - 1.0 mg/dL    Alkaline Phosphatase 74 55 - 135 U/L    AST 36 10 - 40 U/L    ALT 16 10 - 44 U/L    Anion Gap 8 8 - 16 mmol/L    eGFR if African American 25.2 (A) >60 mL/min/1.73 m^2    eGFR if non African American 21.9 (A) >60 mL/min/1.73 m^2     11/28 CXR PA/lat: The cardiomediastinal silhouette is enlarged, similar in configuration as compared to the previous exam.  There is obscuration of the left costophrenic angle suggesting effusion.  The trachea is midline.  The lungs are symmetrically expanded bilaterally with patchy increased interstitial and parenchymal attenuation bilaterally, more focal overlying the right lower lung zone, may reflect atelectasis and pleural fluid, developing consolidation however is not excluded. No large focal consolidation seen.   There is no pneumothorax.  The osseous structures are remarkable for degenerative changes.  Post surgical changes overlie the abdomen.      ASSESSMENT/PLAN:     Acute on chronic diastolic CHF - dyspnea, orthopnea, edema.  2D Echo with new small pericardial effusion, new pleural effusions, also showed her known grade 2 diastolic dysfunction, pHTN, moderate AS. BNP 2331. Trop 0.097, likely strain from acute CHF.   - possibly not absorbing oral diuretics due to gut edema  - lasix IV 80 x 1, then gtt at 10 ml/hr  - continue Lasix gtt at 10   - BP control   - sodium/fluid restriction  - strict I/os, daily weights  - tele  - no cardiology consult for now, but will consult co-management if warranted  - I discussed with Dr BHAKTI yL, plan to diurese her and send her home, then Interventional will bring her in for elective [facility]  2D Echo with CFD   1 - Normal left ventricular systolic function (EF 55-60%).     2 - Impaired LV relaxation, elevated LAP (grade 2 diastolic dysfunction).     3 - Normal right ventricular systolic function .     4 - Pulmonary hypertension. The estimated PA systolic pressure is 68 mmHg.     5 - Trivial to mild aortic regurgitation.     6 - Severe left atrial enlargement.     7 - No wall motion abnormalities.     8 - Mild mitral regurgitation.     9 - Mild tricuspid regurgitation.     10 - Mild pulmonic regurgitation.     11 - Small pericardial effusion.     12 - Left pleural effusion.     13 - Concentric hypertrophy.     14 - Moderate aortic stenosis, PABLITO = 0.93 cm2, peak velocity = 3.5 m/s, mean gradient = 28 mmHg.     Anemia  - chart review including recent BMBx  - basic labs  - consented for transfusion  - f/u H/H tomorrow and transfuse if warranted    L pleural effusion  - medical diuresis for now  - follow    Abnormal CXR - L pleural effusion vs developing consolidation  - monitor closely for signs of PNA  - start Incentive spirometry for atelectasis  - medical diuresis as above  - consider CT  chest inpt vs outpt     CKD-3 in solitary kidney due to kidney donation '85- at baseline  - monitor  - renally-dose all meds  - avoid nephrotoxic meds    Renovascular HTN - SBP persistently elevated 180s-180s  - STOP home norvasc for possible contribution to peripheral edema  - Home irbesartan 150 qhs, consider increase to max 300  - changed BB to coreg 12.5 (from Toprol 50) for optimal antihypertensive properties  - continue home hydralazine 20 BID, consider increase    CAD with KIT to LAD  - Home ASA 81  - Home Plavix 75  - Home irbesartan 150 qhs  - home Toprol XL 50  - home rosuva 40    HLD   - home rosuva 40    SLE - seen in Rheum clinic by Dr Ramirez yesterday, but she was seen prior to Echo result  - home plaquenil 200  - ask Dr Ramirez if still ok to continue plaquenil     Hypothyroidism   - home synthroid 75  - f/u TSH    Mood disorder  - home celexa 20    Mod malnutrition - Alb 2.8 start MVI - monitor    HypoMg - replace IV    HypoK - repace PO    Prophylaxis- lovenox 30    Advance directives - full code    Post-acute care- pending clinical condition and diuresis, home in next 2-3 days, with elective LHC by Dr BHAKTI Ly next week    Time spent in care of patient: 45 mins    Christy Ly MD  Hospital Medicine Staff

## 2017-11-29 NOTE — PLAN OF CARE
Problem: Patient Care Overview  Goal: Plan of Care Review  Outcome: Ongoing (interventions implemented as appropriate)  Pt free of falls, injury this shift. POC reviewed with pt at bedside, verbalized understanding. Lasix gtt continued, pt diuresing well. Complaints of pain managed with PRN tylenol. VSS, NAD noted. Will continue to monitor.

## 2017-11-29 NOTE — PROGRESS NOTES
Patient asked about glaucoma eye drops because she did not receive them last night. Contacted Dr. Ly and she said she would reorder home medication. Will continue to monitor.

## 2017-11-30 ENCOUNTER — OUTPATIENT CASE MANAGEMENT (OUTPATIENT)
Dept: ADMINISTRATIVE | Facility: OTHER | Age: 72
End: 2017-11-30

## 2017-11-30 VITALS
OXYGEN SATURATION: 92 % | RESPIRATION RATE: 18 BRPM | TEMPERATURE: 98 F | HEIGHT: 65 IN | BODY MASS INDEX: 20.94 KG/M2 | WEIGHT: 125.69 LBS | SYSTOLIC BLOOD PRESSURE: 166 MMHG | HEART RATE: 66 BPM | DIASTOLIC BLOOD PRESSURE: 71 MMHG

## 2017-11-30 PROBLEM — Z90.5 SOLITARY KIDNEY, ACQUIRED: Status: ACTIVE | Noted: 2017-11-30

## 2017-11-30 LAB
ALBUMIN SERPL BCP-MCNC: 2.7 G/DL
ALP SERPL-CCNC: 73 U/L
ALT SERPL W/O P-5'-P-CCNC: 14 U/L
ANION GAP SERPL CALC-SCNC: 8 MMOL/L
AST SERPL-CCNC: 34 U/L
BASOPHILS # BLD AUTO: 0.03 K/UL
BASOPHILS NFR BLD: 0.7 %
BILIRUB SERPL-MCNC: 0.2 MG/DL
BUN SERPL-MCNC: 45 MG/DL
CALCIUM SERPL-MCNC: 8.7 MG/DL
CHLORIDE SERPL-SCNC: 109 MMOL/L
CO2 SERPL-SCNC: 24 MMOL/L
CREAT SERPL-MCNC: 2.1 MG/DL
DIFFERENTIAL METHOD: ABNORMAL
EOSINOPHIL # BLD AUTO: 0.1 K/UL
EOSINOPHIL NFR BLD: 2.8 %
ERYTHROCYTE [DISTWIDTH] IN BLOOD BY AUTOMATED COUNT: 14.6 %
EST. GFR  (AFRICAN AMERICAN): 26.7 ML/MIN/1.73 M^2
EST. GFR  (NON AFRICAN AMERICAN): 23.2 ML/MIN/1.73 M^2
GLUCOSE SERPL-MCNC: 71 MG/DL
HCT VFR BLD AUTO: 27.7 %
HGB BLD-MCNC: 8.7 G/DL
IMM GRANULOCYTES # BLD AUTO: 0.02 K/UL
IMM GRANULOCYTES NFR BLD AUTO: 0.5 %
LYMPHOCYTES # BLD AUTO: 1.1 K/UL
LYMPHOCYTES NFR BLD: 25.9 %
MAGNESIUM SERPL-MCNC: 1.9 MG/DL
MCH RBC QN AUTO: 25 PG
MCHC RBC AUTO-ENTMCNC: 31.4 G/DL
MCV RBC AUTO: 80 FL
MONOCYTES # BLD AUTO: 0.7 K/UL
MONOCYTES NFR BLD: 17.5 %
NEUTROPHILS # BLD AUTO: 2.2 K/UL
NEUTROPHILS NFR BLD: 52.6 %
NRBC BLD-RTO: 0 /100 WBC
PHOSPHATE SERPL-MCNC: 4.7 MG/DL
PLATELET # BLD AUTO: 193 K/UL
PMV BLD AUTO: 9.4 FL
POTASSIUM SERPL-SCNC: 3.7 MMOL/L
PROT SERPL-MCNC: 6.5 G/DL
RBC # BLD AUTO: 3.48 M/UL
SODIUM SERPL-SCNC: 141 MMOL/L
WBC # BLD AUTO: 4.24 K/UL

## 2017-11-30 PROCEDURE — A4216 STERILE WATER/SALINE, 10 ML: HCPCS | Performed by: HOSPITALIST

## 2017-11-30 PROCEDURE — 80053 COMPREHEN METABOLIC PANEL: CPT

## 2017-11-30 PROCEDURE — 25000003 PHARM REV CODE 250: Performed by: HOSPITALIST

## 2017-11-30 PROCEDURE — 85025 COMPLETE CBC W/AUTO DIFF WBC: CPT

## 2017-11-30 PROCEDURE — 84100 ASSAY OF PHOSPHORUS: CPT

## 2017-11-30 PROCEDURE — 36415 COLL VENOUS BLD VENIPUNCTURE: CPT

## 2017-11-30 PROCEDURE — 83735 ASSAY OF MAGNESIUM: CPT

## 2017-11-30 PROCEDURE — 99233 SBSQ HOSP IP/OBS HIGH 50: CPT | Mod: ,,, | Performed by: HOSPITALIST

## 2017-11-30 RX ORDER — IRBESARTAN 300 MG/1
300 TABLET ORAL NIGHTLY
Qty: 30 TABLET | Refills: 1 | Status: SHIPPED | OUTPATIENT
Start: 2017-11-30 | End: 2017-12-07 | Stop reason: SDUPTHER

## 2017-11-30 RX ORDER — CARVEDILOL 12.5 MG/1
12.5 TABLET ORAL 2 TIMES DAILY WITH MEALS
Qty: 60 TABLET | Refills: 11 | Status: ON HOLD | OUTPATIENT
Start: 2017-11-30 | End: 2018-01-25 | Stop reason: HOSPADM

## 2017-11-30 RX ORDER — TORSEMIDE 20 MG/1
20 TABLET ORAL 2 TIMES DAILY
Qty: 30 TABLET | Refills: 3 | Status: SHIPPED | OUTPATIENT
Start: 2017-11-30 | End: 2017-12-07 | Stop reason: SDUPTHER

## 2017-11-30 RX ADMIN — ACETAMINOPHEN 650 MG: 325 TABLET ORAL at 11:11

## 2017-11-30 RX ADMIN — CARVEDILOL 12.5 MG: 6.25 TABLET, FILM COATED ORAL at 07:11

## 2017-11-30 RX ADMIN — CITALOPRAM HYDROBROMIDE 20 MG: 20 TABLET ORAL at 08:11

## 2017-11-30 RX ADMIN — ASPIRIN 81 MG: 81 TABLET, COATED ORAL at 08:11

## 2017-11-30 RX ADMIN — THERA TABS 1 TABLET: TAB at 08:11

## 2017-11-30 RX ADMIN — SODIUM CHLORIDE, PRESERVATIVE FREE 3 ML: 5 INJECTION INTRAVENOUS at 05:11

## 2017-11-30 RX ADMIN — CLOPIDOGREL 75 MG: 75 TABLET, FILM COATED ORAL at 08:11

## 2017-11-30 RX ADMIN — CHOLESTYRAMINE 4 G: 4 POWDER, FOR SUSPENSION ORAL at 11:11

## 2017-11-30 RX ADMIN — CHOLESTYRAMINE 4 G: 4 POWDER, FOR SUSPENSION ORAL at 07:11

## 2017-11-30 RX ADMIN — HYDROXYCHLOROQUINE SULFATE 200 MG: 200 TABLET, FILM COATED ORAL at 08:11

## 2017-11-30 RX ADMIN — LEVOTHYROXINE SODIUM 75 MCG: 50 TABLET ORAL at 05:11

## 2017-11-30 RX ADMIN — HYDRALAZINE HYDROCHLORIDE 20 MG: 10 TABLET, FILM COATED ORAL at 08:11

## 2017-11-30 NOTE — PLAN OF CARE
Problem: Patient Care Overview  Goal: Plan of Care Review  Outcome: Ongoing (interventions implemented as appropriate)  Pt had no significant events overnight. Pt encourage pt to call if feeling weak and needing to ambulate. LHC to be done outpatient. Pt being diuresed with lasix gtt. VS remained within normal limits. POC reviewed with pt, pt acknowledged understanding.

## 2017-11-30 NOTE — DISCHARGE SUMMARY
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 Recent admit 11/17-11/19 for acute heart failure exacerbation, diuresed with Lasix, sent home with bumex then torsemide. Per Dr PURDY, today the patient was clinically in ADHF (legs 3+ edema), and the Echo reveals pleural effusions and new pericardial effusion.  DDx per Dr PURDY: Worsening diastolic dysfunction due to CAD vs hypertensive heart disease.  She had similar Sx in 2011 when she had PCI. Needs diuresis then C if renal function will allow. Pt reports feeling dyspnic even after discharge from her recent admission; believes the shortness of breath has been progressive for the past month. States that her lower extremity edema has been persistent since being admitted. States that she does not urinate much after taking her home torsemide. Reports 2-pillow orthopnea. Denies PND. Denies chest pain or palpitations, but states that she feels a tightness around her chest for which she unhooks her bra for relief although she continues to have that sensation after removing the bra. States that she watches the sodium and fluid in her diet. Does not perform daily weights. Gained 10-15 lbs in the past month.      Hospital Course: Admitted to [facility] [place]. Diuresing well  on Lasix gtt at 10, with net -1314cc and weight decreased to 129 lbs (from 132), PEDRO much improved. Diuresing well but noted to have decreasing H/H, discussed with cardiology, anemia workup and possible tranfusion prior to d/c home. Recent BMBx and labs, Dx anemia of chronic disease.  On day of d/c home, she was net -1160, weight decreased to 125 lbs (from 132 on admit) thus 7 lb weight loss, renal function at baseline. H/H stable, no transfusion indicated. d/c home on increased dose of torsemide 20 BID (from daily), new med coreg 12.5 (instead of metoprolol), increased dose of irbesartan 300 (from 150), continue hydralazine (low dose) but titrate upwards in clinic if warranted, STOP norvasc for possible contribution to LE edema per cardiology. Close f/u in [facility] ASAP and Interventional Cardiology (Dr Vahe Ly) for elective LHC in 1-2 weeks.     A/P:  Acute on chronic diastolic CHF - dyspnea, orthopnea, edema.  2D Echo with new small pericardial effusion, new pleural effusions, also showed her known grade 2 diastolic dysfunction, pHTN, moderate AS. BNP 2331. Trop 0.097, likely strain from acute CHF.   - possibly not absorbing oral diuretics due to gut edema  - lasix IV 80 x 1, then gtt at 10 ml/hr  - change to home torsemide but increased dose of 20 BID upon d/c home  - BP control   - sodium/fluid restriction  - strict I/os, daily weights  - tele  - I discussed with Dr BAHKTI Ly, plan to diurese her and send her home, then Interventional will bring her in for elective LHC  2D Echo with CFD   1 - Normal left ventricular systolic function (EF 55-60%).     2 - Impaired LV relaxation, elevated LAP (grade 2 diastolic dysfunction).     3 - Normal right ventricular systolic function .     4 - Pulmonary hypertension. The estimated PA systolic pressure is 68 mmHg.     5 - Trivial to mild aortic regurgitation.     6 - Severe left atrial enlargement.     7 - No wall motion abnormalities.     8 - Mild mitral  "regurgitation.     9 - Mild tricuspid regurgitation.     10 - Mild pulmonic regurgitation.     11 - Small pericardial effusion.     12 - Left pleural effusion.     13 - Concentric hypertrophy.     14 - Moderate aortic stenosis, PABLITO = 0.93 cm2, peak velocity = 3.5 m/s, mean gradient = 28 mmHg.      Anemia of chronic disease/ Recent BMBx by Dr Pham.   "Labs support diagnosis of anemia chronic disease  She has plasma cells noted but not meeting criteria for multiple myeloma"  - H/H stable, and her fatigue is likely due to CHF exacerbation and deconditioning, no indication for transfusion at this time (risk > benefit)     L pleural effusion  - medical diuresis for now  - follow     Abnormal CXR - L pleural effusion vs developing consolidation  - monitor closely for signs of PNA  - start Incentive spirometry for atelectasis  - medical diuresis as above  - consider CT chest as an  outpt      CKD-3 in solitary kidney due to kidney donation '85- at baseline  - monitor  - renally-dose all meds  - avoid nephrotoxic meds     Renovascular HTN - SBP persistently elevated 180s-180s  - STOP home norvasc for possible contribution to peripheral edema  - Increase home irbesartan to max dose 300 (from 150)  - changed BB to coreg 12.5 (from Toprol 50) for optimal antihypertensive properties - HR low 60s-low 70s so will keep this dose  - continue home hydralazine 20 BID, consider increase in clinic     CAD with KIT to LAD  - Home ASA 81  - Home Plavix 75  - Home irbesartan   - home BB chaged to Coreg (fro Toprol) for better antiHTN properties  - home rosuva 40     HLD   - home rosuva 40     SLE - seen in Rheum clinic by Dr Ramirez  - home plaquenil 200     Chronic loose stools and abdominal cramping - workup done in GI clinic (Elizabeth Cohen NP). giardia/crypto, O& P, and pancreatic elastase all negative. CT/Abd pel 3/2017 unremarkable for etiology.  - doing well on home cholestyramine   - home levsin PRN     Hypothyroidism. TSH wnl  - " home synthroid 75     Mood disorder  - home celexa 20     Mod malnutrition - Alb 2.8 start MVI - monitor     HypoMg - replace IV     HypoK - repace PO     Prophylaxis- lovenox 30     Advance directives - full code     Post-acute care- home today with close f/u in [facility] clinic, also elective LHC by Dr BHAKTI Ly next 1-2 weeks     Time spent in care of patient: 45 mins    * No surgery found *        Consults:   Consults         Status Ordering Provider     Inpatient consult to Social Work  Once     Provider:  (Not yet assigned)    Acknowledged ARISTEO LY          Final Active Diagnoses:    Diagnosis Date Noted POA    PRINCIPAL PROBLEM:  Acute on chronic diastolic heart failure [I50.33] 11/27/2017 Yes    Solitary kidney, acquired [Z90.5] 11/30/2017 Not Applicable    Aortic stenosis, moderate [I35.0] 11/18/2017 Yes    Aortic stenosis [I35.0] 08/17/2017 Yes    Anemia associated with chronic renal failure [D63.1] 07/25/2017 Yes    Stage 3 chronic kidney disease [N18.3] 05/16/2017 Yes    Hypertensive kidney disease with chronic kidney disease stage III [I12.9, N18.3] 02/07/2016 Yes    GERD (gastroesophageal reflux disease) [K21.9]  Yes    Hyperlipidemia [E78.5]  Yes    High risk medication use - Both Eyes [Z79.899] 11/27/2012 Not Applicable    Hypertension [I10] 10/05/2012 Yes     Chronic    CAD (coronary artery disease) [I25.10] 10/05/2012 Yes     Chronic    Post PTCA [Z98.61] 10/05/2012 Not Applicable     Chronic    Lupus (systemic lupus erythematosus) [M32.9] 08/17/2012 Yes     Chronic      Problems Resolved During this Admission:    Diagnosis Date Noted Date Resolved POA      Discharged Condition: stable    Disposition: Home or Self Care    Follow Up:  Follow-up Information     Kalie Walton MD.    Specialty:  Family Medicine  Contact information:  [address] [facility]  [address]  [facility] [address] [phone]             [facility].    Specialty:   [facility]  Contact information:  [address]  [facility] [address] [phone]  Additional information:  [facility]               Patient Instructions:     Ambulatory referral to Outpatient Case Management   Referral Priority: Routine Referral Type: Consultation   Referral Reason: Specialty Services Required    Number of Visits Requested: 1      Ambulatory Referral to  [facility]   Referral Priority: Routine Referral Type: Consultation   Referral Reason: Specialty Services Required    Number of Visits Requested: 1      Diet Cardiac     Activity as tolerated     Call MD for:  difficulty breathing or increased cough     Call MD for:  worsening rash     Call MD for:  persistent dizziness, light-headedness, or visual disturbances     Call MD for:  increased confusion or weakness     Call MD for:  severe persistent headache       Medications:  Reconciled Home Medications:   Current Discharge Medication List      START taking these medications    Details   carvedilol (COREG) 12.5 MG tablet Take 1 tablet (12.5 mg total) by mouth 2 (two) times daily with meals.  Qty: 60 tablet, Refills: 11         CONTINUE these medications which have CHANGED    Details   irbesartan (AVAPRO) 300 MG tablet Take 1 tablet (300 mg total) by mouth every evening.  Qty: 30 tablet, Refills: 1      torsemide (DEMADEX) 20 MG Tab Take 1 tablet (20 mg total) by mouth 2 (two) times daily.  Qty: 30 tablet, Refills: 3         CONTINUE these medications which have NOT CHANGED    Details   acetaminophen (TYLENOL) 500 MG tablet Take 500 mg by mouth every 6 (six) hours as needed for Pain.      aspirin (ECOTRIN) 81 MG EC tablet Take 1 tablet (81 mg total) by mouth once daily.  Refills: 0      cholestyramine (QUESTRAN) 4 gram packet Take 1 packet (4 g total) by mouth 3 (three) times daily with meals.  Qty: 90 packet, Refills: 4      citalopram (CELEXA) 20 MG tablet Take 1 tablet  (20 mg total) by mouth once daily.  Qty: 90 tablet, Refills: 1      hydrALAZINE (APRESOLINE) 10 MG tablet TAKE 2 TABLETS EVERY 12 HOURS  Qty: 360 tablet, Refills: 3    Associated Diagnoses: Essential hypertension      hydroxychloroquine (PLAQUENIL) 200 mg tablet TAKE 1 TABLET TWICE DAILY  Qty: 180 tablet, Refills: 1    Associated Diagnoses: Lupus (systemic lupus erythematosus)      latanoprost 0.005 % ophthalmic solution INSTILL 1 DROP INTO BOTH EYES EVERY DAY  Qty: 3 Bottle, Refills: 3      levothyroxine (SYNTHROID) 75 MCG tablet Take 1 tablet (75 mcg total) by mouth before breakfast.  Qty: 90 tablet, Refills: 0    Associated Diagnoses: Hypothyroidism, unspecified type      rosuvastatin (CRESTOR) 40 MG Tab Take 1 tablet (40 mg total) by mouth every evening.  Qty: 90 tablet, Refills: 3      VITAMIN D2 50,000 unit capsule TAKE 1 CAPSULE (50,000 UNITS TOTAL) EVERY 30 DAYS  Qty: 3 capsule, Refills: 0    Associated Diagnoses: Vitamin D deficiency      ammonium lactate 12 % Crea Apply 1 application topically 2 (two) times daily as needed.  Qty: 385 g, Refills: 2    Associated Diagnoses: Dermatitis      betamethasone dipropionate (DIPROLENE) 0.05 % cream Apply topically 2 (two) times daily as needed.  Qty: 45 g, Refills: 2    Associated Diagnoses: Rash      butalbital-aspirin-caffeine -40 mg (FIORINAL) -40 mg Cap Take 1 capsule by mouth every 6 (six) hours as needed.       clopidogrel (PLAVIX) 75 mg tablet Take 1 tablet (75 mg total) by mouth once daily.  Qty: 30 tablet, Refills: 11      conjugated estrogens (PREMARIN) vaginal cream Use 1g nightly for two weeks, then 1g twice per week.  Qty: 30 g, Refills: 12    Associated Diagnoses: Vaginal atrophy      fexofenadine (ALLEGRA) 180 MG tablet Take 1 tablet (180 mg total) by mouth once daily.  Qty: 30 tablet, Refills: 2    Associated Diagnoses: Dizziness      hyoscyamine (LEVSIN/SL) 0.125 mg Subl Place 1 tablet (0.125 mg total) under the tongue 2 (two) times  daily as needed.  Qty: 60 tablet, Refills: 1    Associated Diagnoses: Abdominal cramps      ipratropium (ATROVENT) 0.03 % nasal spray 2 sprays by Nasal route 2 (two) times daily as needed for Rhinitis.  Qty: 30 mL, Refills: 0    Associated Diagnoses: Allergic rhinitis with postnasal drip      meclizine (ANTIVERT) 12.5 mg tablet Take 1 tablet (12.5 mg total) by mouth 3 (three) times daily as needed.  Qty: 30 tablet, Refills: 1    Associated Diagnoses: Dizziness      multivitamin capsule Take 1 capsule by mouth once daily.      !! nitroGLYCERIN (NITROSTAT) 0.4 MG SL tablet PLACE 1 TABLET UNDER THE TONGUE EVERY 5 MINUTES FOR 3 DOSES --IF NOT RELIEVED GO TO THE ER  Qty: 25 tablet, Refills: 6      !! nitroGLYCERIN (NITROSTAT) 0.4 MG SL tablet Place 1 tablet (0.4 mg total) under the tongue every 5 (five) minutes as needed for Chest pain.  Qty: 30 tablet, Refills: 3      nystatin (MYCOSTATIN) ointment Apply topically 2 (two) times daily.  Qty: 30 g, Refills: 2    Associated Diagnoses: Yeast infection      nystatin-triamcinolone (MYCOLOG II) cream Apply topically 4 (four) times daily.  Qty: 60 g, Refills: 0    Associated Diagnoses: Rash, skin      omeprazole (PRILOSEC) 40 MG capsule Take 1 capsule (40 mg total) by mouth once daily.  Qty: 90 capsule, Refills: 3      TENS units Sravanthi 1 application by Misc.(Non-Drug; Combo Route) route daily as needed (pain).  Qty: 1 Device, Refills: 0    Associated Diagnoses: Osteoarthritis of cervical spine      triamcinolone acetonide 0.1% (KENALOG) 0.1 % paste PLACE ONTO TEETH TWICE DAILY  Qty: 5 g, Refills: 0       !! - Potential duplicate medications found. Please discuss with provider.      STOP taking these medications       amLODIPine (NORVASC) 10 MG tablet Comments:   Reason for Stopping:         metoprolol succinate (TOPROL-XL) 50 MG 24 hr tablet Comments:   Reason for Stopping:               Significant Diagnostic Studies: Cardiac Graphics: Echocardiogram:   2D echo with color flow  doppler:   Results for orders placed or performed during the hospital encounter of 11/27/17   2D Echo w/ Color Flow Doppler   Result Value Ref Range    EF 55 55 - 65    Mitral Valve Regurgitation MILD     Diastolic Dysfunction Yes (A)     Aortic Valve Regurgitation TRIVIAL TO MILD     Aortic Valve Stenosis MODERATE (A)     Est. PA Systolic Pressure 68.29 (A)     Pericardial Effusion SMALL (A)     Mitral Valve Mobility NORMAL     Tricuspid Valve Regurgitation MILD        Pending Diagnostic Studies:     None        Indwelling Lines/Drains at time of discharge:   Lines/Drains/Airways          No matching active lines, drains, or airways          Time spent on the discharge of patient: 45 minutes  Patient was seen and examined on the date of discharge and determined to be suitable for discharge.      Christy Ly MD  Department of Hospital Medicine  [facility]

## 2017-11-30 NOTE — PROGRESS NOTES
Thank you for the referral. The following patient has been assigned to Nimo Mercado RN with Outpatient Complex Care Management for high risk screening.    Reason for referral: CHF (congestive heart failure)    Please contact [facility] at [phone] with any questions.    Thank you,  Christy St

## 2017-11-30 NOTE — PROGRESS NOTES
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 Recent admit 11/17-11/19 for acute heart failure exacerbation, diuresed with Lasix, sent home with bumex then torsemide. Per Dr PURDY, today the patient was clinically in ADHF (legs 3+ edema), and the Echo reveals pleural effusions and new pericardial effusion.  DDx per Dr PURDY: Worsening diastolic dysfunction due to CAD vs hypertensive heart disease.  She had similar Sx in 2011 when she had PCI. Needs diuresis then LHC if renal function will allow. Pt reports feeling dyspnic even after discharge from her recent admission; believes the shortness of breath has been progressive for the past month. States that her lower extremity edema has been persistent since being admitted. States that she does not urinate much after taking her home torsemide. Reports 2-pillow orthopnea. Denies PND. Denies chest pain or palpitations, but states that she feels a tightness around her chest for which she unhooks her bra for relief although she continues to have that sensation after removing the bra. States that she watches the sodium and fluid in her diet. Does not perform daily weights. Gained 10-15 lbs in the past month.     Hospital Course: Admitted to [facility]. Diuresing well on Lasix gtt at 10, with net -1314cc and weight decreased to 129 lbs (from 132), PEDRO much improved. Diuresing well but noted to have decreasing H/H, discussed with cardiology, anemia workup and possible tranfusion prior to d/c home. Recent BMBx    Interval History:  net -1160, weight decreased to 125 lbs (from 132 on admit) thus 7 lb weight loss, renal function at baseline. H/H stable, no transfusion indicated. d/c home on increased dose of torsemide 20 BID (from daily), new med coreg 12.5 (instead of metoprolol), increased dose of irbesartan 300 (from 150< continue hydralazine (low dose) but titrate upwards if warranted, STOP norvasc for possible contribution to LE edema per cardiology. [facility] and Interventional Cardiology (Dr Vahe Ly) for elective LHC in 1-2 weeks.     Review of Systems:  Pain Scale: 0 /10   Constitutional: no fever or chills  Respiratory: no cough or shortness of breath  Cardiovascular: no chest pain or palpitations  Gastrointestinal: no nausea or vomiting, no abdominal pain or change in bowel habits  Genitourinary: no hematuria or dysuria  Integument/Breast: no rash or pruritis  Hematologic/Lymphatic: no easy bruising or lymphadenopathy  Musculoskeletal: no arthralgias or myalgias  Neurological: no seizures or tremors  Behavioral/Psych: no depression or anxiety    OBJECTIVE:     Vital Signs Range (Last 24H):  Temp:  [97.9 °F (36.6 °C)-98.6 °F (37 °C)]   Pulse:  [62-76]   Resp:  [17-18]   BP: (151-174)/(68-76)   SpO2:  [90 %-94 %]     I & O (Last 24H):    Intake/Output Summary (Last 24 hours) at 11/30/17 0808  Last data filed at 11/30/17 0500   Gross per 24 hour   Intake              640 ml   Output             2300 ml   Net            -1660 ml       Physical Exam:  General appearance: no distress,  Mental status: Alert and oriented x 3  HEENT:  conjunctivae/corneas clear, PERRL  Neck: supple, thyroid not enlarged, no JVD  Pulm:   normal respiratory effort, CTA B, no c/w/r  Card: RRR, S1, S2 normal, no murmur, click, rub or gallop  Abd: soft, NT, ND, BS present; no masses, no organomegaly  Ext: trace edema  Pulses: 2+, symmetric  Skin: color, texture, turgor normal. No rashes or lesions  Neuro: CN II-XII grossly intact, no  focal numbness or weakness, normal strength and tone     Diagnostic Results:  Labs reviewed    Recent Results (from the past 24 hour(s))   Ferritin    Collection Time: 11/29/17  5:19 PM   Result Value Ref Range    Ferritin 202 20.0 - 300.0 ng/mL   Iron and TIBC    Collection Time: 11/29/17  5:19 PM   Result Value Ref Range    Iron 31 30 - 160 ug/dL    Transferrin 214 200 - 375 mg/dL    TIBC 317 250 - 450 ug/dL    Saturated Iron 10 (L) 20 - 50 %   Reticulocytes    Collection Time: 11/29/17  5:19 PM   Result Value Ref Range    Retic 1.2 0.5 - 2.5 %   Type & Screen    Collection Time: 11/29/17  5:19 PM   Result Value Ref Range    Group & Rh B POS     Indirect Susanna NEG    Vitamin B12    Collection Time: 11/29/17  5:19 PM   Result Value Ref Range    Vitamin B-12 537 210 - 950 pg/mL   Prepare RBC 1 Unit    Collection Time: 11/29/17  5:19 PM   Result Value Ref Range    UNIT NUMBER [id number]     PRODUCT CODE D9798N92     DISPENSE STATUS CROSSMATCHED     CODING SYSTEM IBRN466     Unit Blood Type Code 5100     Unit Blood Type O POS     Unit Expiration [id number]    CBC auto differential    Collection Time: 11/30/17  4:34 AM   Result Value Ref Range    WBC 4.24 3.90 - 12.70 K/uL    RBC 3.48 (L) 4.00 - 5.40 M/uL    Hemoglobin 8.7 (L) 12.0 - 16.0 g/dL    Hematocrit 27.7 (L) 37.0 - 48.5 %    MCV 80 (L) 82 - 98 fL    MCH 25.0 (L) 27.0 - 31.0 pg    MCHC 31.4 (L) 32.0 - 36.0 g/dL    RDW 14.6 (H) 11.5 - 14.5 %    Platelets 193 150 - 350 K/uL    MPV 9.4 9.2 - 12.9 fL    Immature Granulocytes 0.5 0.0 - 0.5 %    Gran # 2.2 1.8 - 7.7 K/uL    Immature Grans (Abs) 0.02 0.00 - 0.04 K/uL    Lymph # 1.1 1.0 - 4.8 K/uL    Mono # 0.7 0.3 - 1.0 K/uL    Eos # 0.1 0.0 - 0.5 K/uL    Baso # 0.03 0.00 - 0.20 K/uL    nRBC 0 0 /100 WBC    Gran% 52.6 38.0 - 73.0 %    Lymph% 25.9 18.0 - 48.0 %    Mono% 17.5 (H) 4.0 - 15.0 %    Eosinophil% 2.8 0.0 - 8.0 %    Basophil% 0.7 0.0 - 1.9 %    Differential Method Automated    Magnesium - if not done in  ED    Collection Time: 11/30/17  4:34 AM   Result Value Ref Range    Magnesium 1.9 1.6 - 2.6 mg/dL   Phosphorus - if not done in ED    Collection Time: 11/30/17  4:34 AM   Result Value Ref Range    Phosphorus 4.7 (H) 2.7 - 4.5 mg/dL   Comprehensive metabolic panel - if not done in ED    Collection Time: 11/30/17  4:34 AM   Result Value Ref Range    Sodium 141 136 - 145 mmol/L    Potassium 3.7 3.5 - 5.1 mmol/L    Chloride 109 95 - 110 mmol/L    CO2 24 23 - 29 mmol/L    Glucose 71 70 - 110 mg/dL    BUN, Bld 45 (H) 8 - 23 mg/dL    Creatinine 2.1 (H) 0.5 - 1.4 mg/dL    Calcium 8.7 8.7 - 10.5 mg/dL    Total Protein 6.5 6.0 - 8.4 g/dL    Albumin 2.7 (L) 3.5 - 5.2 g/dL    Total Bilirubin 0.2 0.1 - 1.0 mg/dL    Alkaline Phosphatase 73 55 - 135 U/L    AST 34 10 - 40 U/L    ALT 14 10 - 44 U/L    Anion Gap 8 8 - 16 mmol/L    eGFR if African American 26.7 (A) >60 mL/min/1.73 m^2    eGFR if non  23.2 (A) >60 mL/min/1.73 m^2     11/28 CXR PA/lat: The cardiomediastinal silhouette is enlarged, similar in configuration as compared to the previous exam.  There is obscuration of the left costophrenic angle suggesting effusion.  The trachea is midline.  The lungs are symmetrically expanded bilaterally with patchy increased interstitial and parenchymal attenuation bilaterally, more focal overlying the right lower lung zone, may reflect atelectasis and pleural fluid, developing consolidation however is not excluded. No large focal consolidation seen.  There is no pneumothorax.  The osseous structures are remarkable for degenerative changes.  Post surgical changes overlie the abdomen.      ASSESSMENT/PLAN:     Acute on chronic diastolic CHF - dyspnea, orthopnea, edema.  2D Echo with new small pericardial effusion, new pleural effusions, also showed her known grade 2 diastolic dysfunction, pHTN, moderate AS. BNP 2331. Trop 0.097, likely strain from acute CHF.   - possibly not absorbing oral diuretics due to gut edema  -  "lasix IV 80 x 1, then gtt at 10 ml/hr  - change to home torsemide but increased dose of 20 BID upon d/c home  - BP control   - sodium/fluid restriction  - strict I/os, daily weights  - tele  - I discussed with Dr BHAKTI Ly, plan to diurese her and send her home, then Interventional will bring her in for elective [facility]  2D Echo with CFD   1 - Normal left ventricular systolic function (EF 55-60%).     2 - Impaired LV relaxation, elevated LAP (grade 2 diastolic dysfunction).     3 - Normal right ventricular systolic function .     4 - Pulmonary hypertension. The estimated PA systolic pressure is 68 mmHg.     5 - Trivial to mild aortic regurgitation.     6 - Severe left atrial enlargement.     7 - No wall motion abnormalities.     8 - Mild mitral regurgitation.     9 - Mild tricuspid regurgitation.     10 - Mild pulmonic regurgitation.     11 - Small pericardial effusion.     12 - Left pleural effusion.     13 - Concentric hypertrophy.     14 - Moderate aortic stenosis, PABLITO = 0.93 cm2, peak velocity = 3.5 m/s, mean gradient = 28 mmHg.     Anemia of chronic disease/ Recent BMBx by Dr Pham.   "Labs support diagnosis of anemia chronic disease  She has plasma cells noted but not meeting criteria for multiple myeloma"  - H/H stable, and her fatigue is likely due to CHF exacerbation and deconditioning, no indication for transfusion at this time (risk > benefit)    L pleural effusion  - medical diuresis for now  - follow    Abnormal CXR - L pleural effusion vs developing consolidation  - monitor closely for signs of PNA  - start Incentive spirometry for atelectasis  - medical diuresis as above  - consider CT chest as an  outpt     CKD-3 in solitary kidney due to kidney donation '85- at baseline  - monitor  - renally-dose all meds  - avoid nephrotoxic meds    Renovascular HTN - SBP persistently elevated 180s-180s  - STOP home norvasc for possible contribution to peripheral edema  - Increase home irbesartan to max dose 300 (from " 150)  - changed BB to coreg 12.5 (from Toprol 50) for optimal antihypertensive properties - HR low 60s-low 70s so will keep this dose  - continue home hydralazine 20 BID, consider increase in clinic    CAD with KIT to LAD  - Home ASA 81  - Home Plavix 75  - Home irbesartan   - home BB chaged to Coreg (fro Toprol) for better antiHTN properties  - home rosuva 40    HLD   - home rosuva 40    SLE - seen in Rheum clinic by Dr Ramirez  - home plaquenil 200    Chronic loose stools and abdominal cramping - workup done in GI clinic (Elizabeth Cohen NP). giardia/crypto, O& P, and pancreatic elastase all negative. CT/Abd pel 3/2017 unremarkable for etiology.  - doing well on home cholestyramine   - home levsin PRN    Hypothyroidism   - home synthroid 75  - f/u TSH    Mood disorder  - home celexa 20    Mod malnutrition - Alb 2.8 start MVI - monitor    HypoMg - replace IV    HypoK - repace PO    Prophylaxis- lovenox 30    Advance directives - full code    Post-acute care- home today with close f/u in [facility], also elective LHC by Dr BHAKTI Ly next 1-2 weeks    Time spent in care of patient: 45 mins    Christy Ly MD  Hospital Medicine Staff

## 2017-11-30 NOTE — PLAN OF CARE
11/30/17 1057   Final Note   Assessment Type Final Discharge Note   Discharge Disposition Home   Hospital Follow Up  Appt(s) scheduled? Yes

## 2017-12-01 ENCOUNTER — TELEPHONE (OUTPATIENT)
Dept: OTHER | Facility: OTHER | Age: 72
End: 2017-12-01

## 2017-12-01 RX ORDER — CITALOPRAM 20 MG/1
20 TABLET, FILM COATED ORAL DAILY
Qty: 90 TABLET | Refills: 1 | Status: SHIPPED | OUTPATIENT
Start: 2017-12-01 | End: 2018-01-29 | Stop reason: SDUPTHER

## 2017-12-01 NOTE — TELEPHONE ENCOUNTER
Mrs. Arnold is enrolled in DMHF program and was able to provide a verbal weight on discharge day1.  She does not have a program scale or a digital scale but is planning to keep using her home dial scale for daily weights.  She is super informed about her health and is just as eager to learn more.  DMF program goals, s/s of HF, sodium and liquid recommendations and medications were all reviewed.  She reports that currently she feels most of the fluid has been removed since hospitalization but she does still notice mild Edema to feet, legs.  She denies abdominal bloating, says Orthopnea and SOB are both better than at admission.  She was discharged on torsemide 20mg BID which she does confirm compliance.  We spent a significant amount of time discussing the uses of each of her medications, sample menu options and ways to keep her on track for improvement.  She is scheduled for [facility] in near future.  She confirms adequate UOP with torsemide although she is skeptical b/c of the med changes that occurred in house (lasix-bumex-torsemide).  She understands that we will help to address her medication concerns throughout the month.  She will continue to log her daily weights at home and she knows we will be contacting her daily during her first week home to help establish a weight trend.  Program contact information was reviewed and patient was encouraged to call with questions or concerns.   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 Recent admit 11/17-11/19 for acute heart failure exacerbation, diuresed with Lasix, sent home with bumex then torsemide.  Per Dr PURDY, today the patient was clinically in ADHF (legs 3+ edema), and the Echo reveals pleural effusions and new pericardial effusion.  DDx per Dr PURDY: Worsening diastolic dysfunction due to CAD vs hypertensive heart disease.  She had similar Sx in 2011 when she had PCI. Needs diuresis then LHC if renal function will allow. Pt reports feeling dyspnic even after discharge from her recent admission; believes the shortness of breath has been progressive for the past month. States that her lower extremity edema has been persistent since being admitted. States that she does not urinate much after taking her home torsemide. Reports 2-pillow orthopnea. Denies PND. Denies chest pain or palpitations, but states that she feels a tightness around her chest for which she unhooks her bra for relief although she continues to have that sensation after removing the bra. States that she watches the sodium and fluid in her diet. Does not perform daily weights. Gained 10-15 lbs in the past month.      Issues: insurance coverage + prescription affordability and poor dietary compliance (reviewed low sodium diet (1,500 mg/day) + fluid restriction (50 oz))      Mrs. Anrold lives alone in [place], [place]. She has family nearby and states they are a good support system. Denies depression or anxiety. PPAT message sent for possible assistance with Plavix Rx. She has a scale at home which she will use for the duration of her enrollment -- will text in or call in weights depending on her need for PharmD assistance. No complaints at time of assessment. She has been sleeping propped up with ~2 pillows. Reports poor sleep due to diuresis, approx 4-5 hours max each night. Low scoring on salt intake assessment, however, we reviewed her dietary intake which revealed she does occasionally eat toast or raisin bread, baked goods, pizza, fast food burgers, turkey sandwiches with mayonnaise, eggs with salt added, canned vegetables, various salad dressings,  etc. Reviewed HF Zones sheet, dietary sheets, and sample menu.

## 2017-12-02 ENCOUNTER — TELEPHONE (OUTPATIENT)
Dept: OTHER | Facility: OTHER | Age: 72
End: 2017-12-02

## 2017-12-02 NOTE — TELEPHONE ENCOUNTER
Ms. Chen's weight is stable today.  She reports that her leg edema did reduce mildly yesterday but returned some late in the evening.  She feels today will be better as she will be more focused on her diet and better timing of her medications.  She has no new issues to report and will continue weighing herself daily as recommended.She will remain on torsemide 20mg BID   I will reach out to her tomorrow for updated information.   Weight: 55.3 kg (122 lb) (12/2/2017 11:47 AM)

## 2017-12-03 ENCOUNTER — TELEPHONE (OUTPATIENT)
Dept: OTHER | Facility: OTHER | Age: 72
End: 2017-12-03

## 2017-12-03 LAB
BLD PROD TYP BPU: NORMAL
BLOOD UNIT EXPIRATION DATE: NORMAL
BLOOD UNIT TYPE CODE: 5100
BLOOD UNIT TYPE: NORMAL
CODING SYSTEM: NORMAL
DISPENSE STATUS: NORMAL
TRANS ERYTHROCYTES VOL PATIENT: NORMAL ML

## 2017-12-03 NOTE — TELEPHONE ENCOUNTER
Mrs. Arnold reports a 2lb weight loss overnight.  She reports while she is losing weight, she still notices LLE up her leg and some tightness in her calf.  She doesn't feel she is urinating as much as she did prior but denies SOB, PEDRO, abdominal bloating or PND.  She is scheduled to see Family medicine tomorrow and will call her weight in prior to leaving for that appointment.  She knows she is more disciplined with her diet lately and she feels she may not have met her liquid recommendation yesterday; she will focus on meeting the 50oz throughout the day today.  Overall, she is feeling good and has no complaints.  She will remain on torsemide 20mg BID for now.    Weight: 54.4 kg (120 lb) (12/3/2017 10:10 AM)

## 2017-12-04 ENCOUNTER — OFFICE VISIT (OUTPATIENT)
Dept: FAMILY MEDICINE | Facility: CLINIC | Age: 72
End: 2017-12-04
Payer: MEDICARE

## 2017-12-04 ENCOUNTER — TELEPHONE (OUTPATIENT)
Dept: OTHER | Facility: OTHER | Age: 72
End: 2017-12-04

## 2017-12-04 ENCOUNTER — OUTPATIENT CASE MANAGEMENT (OUTPATIENT)
Dept: ADMINISTRATIVE | Facility: OTHER | Age: 72
End: 2017-12-04

## 2017-12-04 VITALS
HEIGHT: 64 IN | TEMPERATURE: 98 F | HEART RATE: 64 BPM | SYSTOLIC BLOOD PRESSURE: 148 MMHG | BODY MASS INDEX: 21.07 KG/M2 | DIASTOLIC BLOOD PRESSURE: 78 MMHG | WEIGHT: 123.44 LBS

## 2017-12-04 DIAGNOSIS — D64.9 ANEMIA, UNSPECIFIED TYPE: Primary | ICD-10-CM

## 2017-12-04 DIAGNOSIS — E46 MALNUTRITION, UNSPECIFIED TYPE: ICD-10-CM

## 2017-12-04 LAB
BILIRUB SERPL-MCNC: ABNORMAL MG/DL
BLOOD URINE, POC: ABNORMAL
COLOR, POC UA: YELLOW
GLUCOSE UR QL STRIP: NORMAL
KETONES UR QL STRIP: ABNORMAL
LEUKOCYTE ESTERASE URINE, POC: ABNORMAL
NITRITE, POC UA: ABNORMAL
PH, POC UA: 5
PROTEIN, POC: 100
SPECIFIC GRAVITY, POC UA: 1.01
UROBILINOGEN, POC UA: NORMAL

## 2017-12-04 PROCEDURE — 99213 OFFICE O/P EST LOW 20 MIN: CPT | Mod: 25,S$GLB,, | Performed by: FAMILY MEDICINE

## 2017-12-04 PROCEDURE — 99999 PR PBB SHADOW E&M-EST. PATIENT-LVL IV: CPT | Mod: PBBFAC,,, | Performed by: FAMILY MEDICINE

## 2017-12-04 PROCEDURE — 81002 URINALYSIS NONAUTO W/O SCOPE: CPT | Mod: S$GLB,,, | Performed by: FAMILY MEDICINE

## 2017-12-04 PROCEDURE — 99499 UNLISTED E&M SERVICE: CPT | Mod: S$GLB,,, | Performed by: FAMILY MEDICINE

## 2017-12-04 NOTE — LETTER
December 4, 2017    Ewelina Arnold  [address]  [place] [address]             [facility]  [address]  [facility] [address] Dear Ms. Arnold:    Thank you for taking my call and discussing case management with me.  We understand that receiving many services from different doctors and healthcare providers is overwhelming. There are appointments to make, transportation to arrange, dietary instructions to understand, and new medications to obtain.    This is whereI can help. This best thing about this service is there is no charge for this support.     We can do this over the phone as to not conflict with any other appointments you have scheduled with home health and such.       Our goal is to help you manage your health condition(s) safely within your living environment, whether that is your home or a medical facility. We want to help you function at the healthiest and highest level possible.   I have enclosed some information on management of congestive heart failuire and low sodium diet for your review.    I have enclosed my contact information and information on OPCM services.  I am available Monday through Wednesday 8- 4:30 pm.   If you have any questions or concerns, please don't hesitate to call me at [phone] or the office at [phone].    I will be contacting you periodically for the next 2 months to assist with your care needs.  Sincerely,    Nimo Mercado RN,[facility]  Outpatient Complex Case Management  [phone]

## 2017-12-04 NOTE — PROGRESS NOTES
Received referral for High Risk screen.    Verifiedptidentityanddiscussedoutpatientcasemanagementservices.Advisedonservicesofferedandadvantagesandpatientconsentedtotheseservices.Wphufbbrsobquwjeldbtwlsgyfrhpzwkgwafxdexkfwcbuyfjegxsjglovzivhovuaeuzofiw31jq56fwnx.  71yofemalewithhistoryofCAD,HBP,PAD,CHF,pulmonaryhypertensionandLupus,Ptalsodonatedkidneytobrotherinpast.Pthad2recenthospitalizationswithchfinNovemberandsheisnowenrolledinthedigitalchfprogramhereatochsner.  Pt is weighing daily today she is 119.  She states she still has slight swelling in ankles.  Pt is very concerned about wt loss and I advised that most may be due to diuresis.  She states she has been loosing wt over past couple of months.  Most recent wt loss possibly due to fluid removal from chf but upon chart review noted pt wt in 3/2016 was 145 and she has gradually decreased since then to current wt 119.  She has addressed this with pcp and had work up including colonoscopy and essentially negative.  States appetite is not what it was but also states primarily because of changes with decreasing salt in diet.  After visit summary has most accurate list of medications Keep a copy after each md visit and check for accuracy.      [facility] for medications with no copay and OTC quarterly allowance    Nursing screen and assessment completed today telephonically.      Plan of care developed with pt input.  Short and long term goals reviewed with patient and patient verbalized understanding and agreement to these goals    Several interventions were completed today as noted  Advised on human heat  [employer]Plus program with the decreased copays and other benefits available  Call to [employer] dine and ordered meals and signed up for OTC benefits  Reviewed wt trends and discussed bronson carrizales pt    Welcome statement and contact information as well as [place] information sent via mail.Pt advised to look mail for  communication    Plan  Followuponreceiptofeducationmaterial  Review diet logs what type of liquids drinking?

## 2017-12-04 NOTE — PATIENT INSTRUCTIONS
Left- or Right- Side Congestive Heart Failure (CHF)    The heart is a large muscle. It is a pump that circulates blood throughout the body. Blood carries oxygen to all of the organs, including the brain, muscles, and skin. After your body takes the oxygen out of the blood, the blood returns to the heart. The right side of the heart collects the blood from the body and pumps it to the lungs. In the lungs, it gets fresh oxygen and gives up carbon dioxide. The oxygen-rich blood from the lungs then returns to the left side of the heart, where it is pumped back out to the rest of your body, starting the process all over.  Congestive heart failure (CHF) occurs when the heart muscle is weakened. This affects the pumping action of the heart. Heart failure can affect the right side of the heart or the left side. But heart failure may affect not only the right side of the heart or only the left side. Although it may have started on one side, it can and often eventually does affect both sides.  Right-side heart failure  When the right side of the heart is weakened, it cant handle the blood it is getting from the rest of the body. This blood returns to the heart through veins. When too much pressure builds up in the veins, fluid leaks out into the tissues. Gravity then causes that fluid to move to those parts of the body that are the lowest. So one of the first symptoms of right-side CHF can include swelling in the feet and ankles. If the condition gets worse, the swelling can even go up past the knees. Sometimes it gets so severe, the liver can get congested as well.  Left-side heart failure  When the left side of the heart is weakened, it cant handle the blood it gets from the lungs. Pressure then builds up in the veins of the lungs, causing fluid to leak into the lung tissues. This may cause CHF and pulmonary edema. This causes you to feel short of breath, weak, or dizzy. These symptoms are often worse with exertion,  such as when climbing stairs or walking up hills. Lying with your head flat is uncomfortable and can make your breathing worse. This may make sleeping difficult. You may need to use extra pillows to elevate your upper body to sleep well. The same is true when just resting during the daytime.  There are many causes of heart failure including:  · Coronary artery disease  · Past heart attack (also known as acute myocardial infarction, or AMI)  · High blood pressure  · Damaged heart valve  · Diabetes  · Obesity  · Cigarette smoking  · Alcohol abuse  Heart failure is a chronic condition. There is no cure. The purpose of medical treatment is to improve the pumping action of the heart. The main way to do this is to remove excess water from the body. A number of medicines can help reach this goal, improve symptoms, and prevent the heart from becoming weaker. Sometimes, heart failure can become so severe that a device is placed in the heart to help with pumping. Another major goal is to better treat the causes of heart failure, such as diabetes and high blood pressure, by making changes in your lifestyle and maximizing medical control when needed.  Home care  Follow these guidelines when caring for yourself at home:  · Check your weight every day. This is very important because a sudden increase in weight gain could mean worsening heart failure. Keep these things in mind:  ¨ Use the same scale every day.  ¨ Weigh yourself at the same time every day.  ¨ Make sure the scale is on a hard floor surface, not on a rug or carpet.  ¨ Keep a record of your weight every day so your healthcare provider can see it. If you are not given a log sheet for this, keep a separate journal for this purpose.   · Cut back on the amount of salt (sodium) you eat. Follow your healthcare provider's recommendation on how much salt or sodium you should have each day.  ¨ Avoid high-salt foods. These include olives, pickles, smoked meats, salted potato  chips, and most prepared foods.  ¨ Don't add salt to your food at the table. Use only small amounts of salt when cooking.  ¨ Read the labels carefully on food packages to learn how much salt or sodium is in each serving in the package. Remember, a can or package of food may contain more than 1 serving. So if you eat all the food in the package, you may be getting more salt than you think.  · Follow your healthcare provider's recommendations about how much fluid you should have. Be aware that some foods, such as soup, pudding, and juicy fruits like oranges or melons, contain liquid. You'll need to count the liquid in those foods as part of your daily fluid intake. Your provider can help you with this.  · Stop smoking.  · Cut back on how much alcohol you drink.  · Lose weight if you are overweight. The excess weight adds a lot of stress on the workload of the heart.  · Stay active. Talk with your provider about an exercise program that is safe for your heart.  · Keep your feet elevated to reduce swelling. Ask your provider about support hose as a preventive treatment for daytime leg swelling.  Besides taking your medicine as instructed, an important part of treatment is lifestyle changes. These include diet, physical activity, stopping smoking, and weight control.  Improve your diet by including more fresh foods, cutting back on how much sugar and saturated fat you eat, and eating fewer processed foods and less salt.  Follow-up care  Follow up with your healthcare provider, or as advised.  Make sure to keep any appointments that were made for you. These can help better control your congestive heart failure. You will need to follow up with your provider on a routine basis to make sure your heart failure is well managed.  If an X-ray, ECG, or other tests were done, you will be told of any new findings that may affect your care.  Call 911  Call 911 if you:  · Become severely short of breath  · Feel lightheaded, or feel  like you might pass out or faint  · Have chest pain or discomfort that is different than usual, the medicines your doctor told you to use for this don't help, or the pain lasts longer than 10 to 15 minutes  · You suddenly develop a rapid heart rate  When to seek medical advice  The following may be signs that your heart failure is getting worse. Call your healthcare provider right away if any of these happen:  · Sudden weight gain. This means 3 or more pounds in one day, or 5 or more pounds in 1 week.  · Trouble breathing not related to being active  · New or increased swelling of your legs or ankles  · Swelling or pain in your abdomen  · Breathing trouble at night. This means waking up short of breath or needing more pillows to breathe.  · Frequent coughing that doesnt go away  · Feeling much more tired than usual  Date Last Reviewed: 1/4/2016  © 1710-2278 [employer]. [address] [place] [address]. All rights reserved. This information is not intended as a substitute for professional medical care. Always follow your healthcare professional's instructions.        Discharge Instructions for Heart Failure  The heart is a muscle that pumps oxygen-rich blood to all parts of the body. When you have heart failure, the heart is not able to pump as well as it should. Blood and fluid may back up into the lungs (congestive heart failure), and some parts of the body dont get enough oxygen-rich blood to work normally. These problems lead to the symptoms of heart failure. Heart failure can occur due to an injury to the heart or from natural processes.  You can control symptoms of heart failure with some lifestyle changes and by following your doctor's advice.  Home care  Activity  Ask your healthcare provider about an exercise program. You can benefit from simple activities such as walking or gardening. Exercising most days of the week can make you feel better. Don't be discouraged if your progress  is slow at first. Rest as needed. Stop activity if you develop symptoms such as chest pain, lightheadedness, or significant shortness of breath. Find activities that you enjoy, such as brisk walking, dancing, swimming, or gardening. These will help you stay active and strengthen your heart.  Diet  Follow a heart healthy diet. And make sure to limit the salt (sodium) in your diet. Salt causes your body to hold water. This makes your heart work harder as there is more fluid for the heart to pump. Limit your salt by doing the following:  · Limit canned, dried, packaged, and fast foods.  · Don't add salt to your food.  · Season foods with herbs instead of salt.  · Watch how much liquids you drink. Drinking too much can make heart failure worse. Talk with your health care provider about how much you should drink each day.  · Limit the amount of alcohol you drink. It may harm your heart. Women should have no more than 1 drink a day and men should have no more than 2.  · When you eat out, request that your meals have no added salt.  Tobacco  If you smoke, it's very important to quit. Smoking increases your chances of having a heart attack by harming the blood vessels that provide oxygen to your heart. This makes heart failure worse. Quitting smoking is the number one thing you can do to improve your health. Enroll in a stop-smoking program to improve your chances of success. Talk with your healthcare provider about medicines or nicotine replacement therapy to help you quit smoking. Ask your healthcare provider about smoking cessation support groups.  Medicine  Take your medicines exactly as prescribed. Learn the names and purpose of each of your medicines. Keep an accurate medicine list and current dosages with you at all times. Don't skip doses. If you miss a dose of your medicine, take it as soon as you remember. If you miss a dose and it's almost time for your next dose, just wait and take your next dose at the normal  time. Don't take a double dose. If you are unsure, call your doctor's office. Make sure not to mix up your medicines or forget what you've taken the same day.  Weight monitoring  Weigh yourself every day. A sudden weight gain can mean your heart failure is getting worse. Weigh yourself at the same time of day and in the same kind of clothes. Ideally, weigh yourself first thing in the morning after you empty your bladder, but before you eat breakfast. Your healthcare provider will show you how to track your weight. He or she will also discuss with you when you should call if you have a sudden, unexpected increase in your weight.  In general, your healthcare provider may ask you to report if your weight goes up by more than 2 pounds in 1 day,  5 pounds in 1 week, or whatever weight gain you were told by your doctor. This is a sign that you are retaining more fluid than you should be. Clues to weight gain include checking your ankles for swelling, or noticing you are short of breath when you lie down.  Follow-up care  Make a follow-up appointment as directed. Depending on the type and severity of heart failure you have, you may need follow-up as early as 7 days from hospital discharge. Keep appointments for checkups and lab tests that are needed to check your medicines and condition.  Recognize that your health and even survival depend on your following medical recommendations.  Symptoms  Heart failure can cause a variety of symptoms, including:  · Shortness of breath  · Trouble breathing at night, especially when you lie down  · Swelling in the legs and feet or in the belly (abdomen)  · Becoming easily fatigued  · Irregular or rapid heartbeat  · Weakness or lightheadedness  · Swelling of the neck veins  It is important to know what to do if symptoms get worse or if you develop signs of worsening heart failure.     When to see your healthcare provider  Call your doctor right away if you have any of these signs of  worsening heart failure:  · Sudden weight gain (more than 2 pounds in 1 day or 5 pounds in 1 week, or whatever weight gain you were told to report by your doctor)  · Trouble breathing not related to being active  · New or increased swelling of your legs or ankles  · Swelling or pain in your abdomen  · Breathing trouble at night (waking up short of breath, needing more pillows to breathe)  · Frequent coughing that doesn't go away  · Feeling much more tired than usual  Call 911  Call 911 right away if you have:  · Severe shortness of breath, such that you can't catch your breath even while resting  · Severe chest pain that does not resolve with rest or nitroglycerin  · Pink, foamy mucus with cough and shortness of breath  · A continuous rapid or irregular heartbeat  · Passing out or fainting  · Stroke symptoms such as sudden numbness or weakness on one side of your face, arm, or leg or sudden confusion, trouble speaking or vision changes   Date Last Reviewed: 3/21/2016  © 0085-5568 [employer]. [address] [place] [address]. All rights reserved. This information is not intended as a substitute for professional medical care. Always follow your healthcare professional's instructions.        Heart Failure: Being Active  You have a condition called heart failure. Being active doesnt mean that you have to wear yourself out. Even a little movement each day helps to strengthen your heart. If you cant get out to exercise, you can do simple stretching and strengthening exercises at home. These are good ways to keep you well-conditioned and prevent you and your heart from becoming excessively weak.    Ideas to get you started  · Add a little movement to things you do now. Walk to mail letters. Park your car at the far end of the parking lot and walk to the store. Walk up a flight of stairs instead of taking the elevator.  · Choose activities you enjoy. You might walk, swim, or ride an exercise bike.  Things like gardening and washing the car count, too. Other possibilities include: washing dishes, walking the dog, walking around the mall, and doing aerobic activities with friends.  · Join a group exercise program at a [facility] or [facility], a senior center, or a community center. Or look into a hospital cardiac rehabilitation program. Ask your doctor if you qualify.  Tips to keep you going  · Get up and get dressed each day. Go to a coffee shop and read a newspaper or go somewhere that you'll be in the presence of other active people. Youll feel more like being active.  · Make a plan. Choose one or more activities that you enjoy and that you can easily do. Then plan to do at least one each day. You might write your plan on a calendar.  · Go with a friend or a group if you like company. This can help you feel supported and stay motivated, too.  · Plan social events that you enjoy. This will keep you mentally engaged as well as physically motivated to do things you find pleasure in.  For your safety  · Talk with your healthcare provider before starting an exercise program.  · Exercise indoors when its too hot or too cold outside, or when the air quality is poor. Try walking at a shopping mall.  · Wear socks and sturdy shoes to maintain your balance and prevent falls.  · Start slowly. Do a few minutes several times a day at first. Increase your time and speed little by little.  · Stop and rest whenever you feel tired or get short of breath.  · Dont push yourself on days when you dont feel well.  Date Last Reviewed: 3/20/2016  © 4526-7679 [employer]. [address] [place] [address], [place] [address]. All rights reserved. This information is not intended as a substitute for professional medical care. Always follow your healthcare professional's instructions.        Heart Failure: Making Changes to Your Diet  You have a condition called heart failure. When you have heart failure, excess fluid is more likely to build  up in your body because your heart isn't working well. This makes the heart work harder to pump blood. Fluid buildup causes symptoms such as shortness of breath and swelling (edema). This is often referred to as congestive heart failure or CHF. Controlling the amount of salt (sodium) you eat may help stop fluid from building up. Your doctor may also tell you to reduce the amount of fluid you drink.  Reading food labels    Your healthcare provider will tell you how much sodium you can eat each day. Read food labels to keep track. Keep in mind that certain foods are high in salt. These include canned, frozen, and processed foods. Check the amount of sodium in each serving. Watch out for high-sodium ingredients. These include MSG (monosodium glutamate), baking soda, and sodium phosphate.   Eating less salt  Give yourself time to get used to eating less salt. It may take a little while. Here are some tips to help:  · Take the saltshaker off the table. Replace it with salt-free herb mixes and spices.  · Eat fresh or plain frozen vegetables. These have much less salt than canned vegetables.  · Choose low-sodium snacks like sodium-free pretzels, crackers, or air-popped popcorn.  · Dont add salt to your food when youre cooking. Instead, season your foods with pepper, lemon, garlic, or onion.  · When you eat out, ask that your food be cooked without added salt.  · Avoid eating fried foods as these often have a great deal of salt.  If youre told to limit fluids  You may need to limit how much fluid you have to help prevent swelling. This includes anything that is liquid at room temperature, such as ice cream and soup. If your doctor tells you to limit fluid, try these tips:  · Measure drinks in a measuring cup before you drink them. This will help you meet daily goals.  · Chill drinks to make them more refreshing.  · Suck on frozen lemon wedges to quench thirst.  · Only drink when youre thirsty.  · Chew sugarless gum or  suck on hard candy to keep your mouth moist.  · Weigh yourself daily to know if your body's fluid content is rising.  My sodium goal  Your healthcare provider may give you a sodium goal to meet each day. This includes sodium found in food as well as salt that you add. My goal is to eat no more than ___________ mg of sodium per day.     When to call your doctor  Call your doctor right away if you have any symptoms of worsening heart failure. These can include:  · Sudden weight gain  · Increased swelling of your legs or ankles  · Trouble breathing when youre resting or at night  · Increase in the number of pillows you have to sleep on  · Chest pain, pressure, discomfort, or pain in the jaw, neck, or back   Date Last Reviewed: 3/21/2016  © 2048-8547 [employer]. [address] [place] [address]. All rights reserved. This information is not intended as a substitute for professional medical care. Always follow your healthcare professional's instructions.        Heart Failure: Tracking Your Weight  You have a condition called heart failure. When you have heart failure, a sudden weight gain or a steady rise in weight is a warning sign that your body is retaining too much water and salt. This could mean your heart failure is getting worse. If left untreated, it can cause problems for your lungs and result in shortness of breath. Weighing yourself each day is the best way to know if youre retaining water. If your weight goes up quickly, call your doctor. You will be given instructions on how to get rid of the excess water. You will likely need medicines and to avoid salt. This will help your heart work better.  Call your doctor if you gain more than 2 pounds in 1 day, more than 5 pounds in 1 week, or whatever weight gain you were told to report by your doctor. This is often a sign of worsening heart failure and needs to be evaluated and treated. Your doctor will tell you what to do next.   Tips for  weighing yourself    · Weigh yourself at the same time each morning, wearing the same clothes. Weigh yourself after urinating and before eating.  · Use the same scale each day. Make sure the numbers are easy to read. Put the scale on a flat, hard surface -- not on a rug or carpet.  · Do not stop weighing yourself. If you forget one day, weigh again the next morning.  How to use your weight chart  · Keep your weight chart near the scale. Write your weight on the chart as soon as you get off the scale.  · Fill in the month and the start date on the chart. Then write down your weight each day. Your chart will look like this:    · If you miss a day, leave the space blank. Weigh yourself the next day and write your weight in the next space.  · Take your weight chart with you when you go to see your doctor.  Date Last Reviewed: 3/20/2016  © 2624-0592 [employer]. [address] [place] [address], [place] [address]. All rights reserved. This information is not intended as a substitute for professional medical care. Always follow your healthcare professional's instructions.        Heart Failure: Warning Signs of a Flare-Up  You have a condition called heart failure. Once you have heart failure, flare-ups can happen. Below are signs that can mean your heart failure is getting worse. If you notice any of these warning signs, call your healthcare provider.  Swelling    · Your feet, ankles, or lower legs get puffier.  · You notice skin changes on your lower legs.  · Your shoes feel too tight.  · Your clothes are tighter in the waist.  · You have trouble getting rings on or off your fingers.  Shortness of breath  · You have to breathe harder even when youre doing your normal activities or when youre resting.  · You are short of breath walking up stairs or even short distances.  · You wake up at night short of breath or coughing.  · You need to use more pillows or sit up to sleep.  · You wake up tired or restless.  Other  warning signs  · You feel weaker, dizzy, or more tired.  · You have chest pain or changes in your heartbeat.  · You have a cough that wont go away.  · You cant remember things or dont feel like eating.  Tracking your weight  Gaining weight is often the first warning sign that heart failure is getting worse. Gaining even a few pounds can be a sign that your body is retaining excess water and salt. Weighing yourself each day in the morning after you urinate and before you eat, is the best way to know if you're retaining water. Get a scale that is easy to read and make sure you wear the same clothes and use the same scale every time you weigh. Your healthcare provider will show you how to track your weight. Call your doctor if you gain more than 2 pounds in 1 day, 5 pounds in 1 week, or whatever weight gain you were told to report by your doctor. This is often a sign of worsening heart failure and needs to be evaluated and treated before it compromises your breathing. Your doctor will tell you what to do next.    Date Last Reviewed: 3/15/2016  © 0919-7393 [employer]. [address] [place] [address]. All rights reserved. This information is not intended as a substitute for professional medical care. Always follow your healthcare professional's instructions.            Date Last Reviewed: 9/30/2015  © 7234-9562 [employer]. [address] [place] [address]. All rights reserved. This information is not intended as a substitute for professional medical care. Always follow your healthcare professional's instructions.        Discharge Instructions: Eating a Low-Salt Diet  Your health care provider has prescribed a low-salt diet for you. Most people with heart problems need to eat less salt, which is full of sodium. Too much sodium is linked to high blood pressure, which is linked to a greater risk of heart disease, stroke, blindness, and kidney problems.  Home care    Learn  ways to cut back on salt (sodium):  · Eat less frozen, canned, dried, packaged, and fast foods. These often contain high amounts of sodium.  · Season foods with herbs instead of salt when you cook.  · Season with flavorings such as pepper, lemon, garlic, and onion.  · Dont add salt to your food at the table.  · Sprinkle salt-free herbal blends on meats and vegetables.  Learn to read food labels carefully:  · Look for the total amount of sodium per serving.  · Look for foods labeled low sodium, reduced sodium, or no added salt.  · Beware. Salt goes by many names. Cut down on foods with these words (all forms of salt) listed as ingredients:  ¨ Salt  ¨ Sodium  ¨ Soy sauce  ¨ Baking soda  ¨ Baking powder  ¨ MSG  ¨ Monosodium  ¨ Na (the chemical symbol for sodium)  Other ideas:  · Use more fresh food. Buy more fruits and vegetables.  · Select lean meats, fish, and poultry.  · Find a cookbook with low-salt recipes. Youll find ideas for tasty meals that are healthy for your heart.  ·   When eating out, ask questions about the menu. Tell the  you're on a low-salt diet.  ¨ If you order fish, chicken, beef, or pork, ask the  to have it broiled, baked, poached, or grilled without salt, butter, or breading.  ¨ Choose plain steamed rice, boiled noodles, and baked or boiled potatoes. Top potatoes with chives and a little sour cream instead of butter.  · Avoid antacids that are high in salt. Check the label before you buy.  Follow-up  Make a follow-up appointment with a nutritionist as directed by our staff.  Date Last Reviewed: 6/20/2015  © 3948-3575 [employer]. [address] [place] [address], [place] [address]. All rights reserved. This information is not intended as a substitute for professional medical care. Always follow your healthcare professional's instructions.        Low-Salt Choices  Eating salt (sodium) can make your body retain too much water. Excess water makes your heart work harder. Canned,  packaged, and frozen foods are easy to prepare, but they are often high in sodium. Here are some ideas for low-salt foods you can easily prepare yourself.    For breakfast  · Fruit or 100% fruit juice  · Whole-wheat bread or an English muffin. Compare sodium content on labels.  · Low-fat milk or yogurt  · Unsalted eggs  · Shredded wheat  · Corn tortillas  · Unsalted steamed rice  · Regular (not instant) hot cereal, made without salt  Stay away from:  · Sausage, chiu, and ham  · Flour tortillas  · Packaged muffins, pancakes, and biscuits  · Instant hot cereals  · Cottage cheese  For lunch and dinner  · Fresh fish, chicken, turkey, or meat--baked, broiled, or roasted without salt  · Dry beans, cooked without salt  · Tofu, stir-fried without salt  · Unsalted fresh fruit and vegetables, or frozen or canned fruit and vegetables with no added salt  Stay away from:  · Lunch or deli meat that is cured or smoked  · Cheese  · Tomato juice and catsup  · Canned vegetables, soups, and fish not labeled as no-salt-added or reduced sodium  · Packaged gravies and sauces  · Olives, pickles, and relish  · Bottled salad dressings  For snacks and desserts  · Yogurt  · Unsalted, air popped popcorn  · Unsalted nuts or seeds  Stay away from:  · Pies and cakes  · Packaged dessert mixes  · Pizza  · Canned and packaged puddings  · Pretzels, chips, crackers, and nuts--unless the label says unsalted  Date Last Reviewed: 6/17/2015  © 7109-9459 [employer]. [address] [place] [address], [place] [address]. All rights reserved. This information is not intended as a substitute for professional medical care. Always follow your healthcare professional's instructions.        Discharge Instructions: Eating a Low-Salt Diet  Your health care provider has prescribed a low-salt diet for you. Most people with heart problems need to eat less salt, which is full of sodium. Too much sodium is linked to high blood pressure, which is linked to a greater  risk of heart disease, stroke, blindness, and kidney problems.  Home care    Learn ways to cut back on salt (sodium):  · Eat less frozen, canned, dried, packaged, and fast foods. These often contain high amounts of sodium.  · Season foods with herbs instead of salt when you cook.  · Season with flavorings such as pepper, lemon, garlic, and onion.  · Dont add salt to your food at the table.  · Sprinkle salt-free herbal blends on meats and vegetables.  Learn to read food labels carefully:  · Look for the total amount of sodium per serving.  · Look for foods labeled low sodium, reduced sodium, or no added salt.  · Beware. Salt goes by many names. Cut down on foods with these words (all forms of salt) listed as ingredients:  ¨ Salt  ¨ Sodium  ¨ Soy sauce  ¨ Baking soda  ¨ Baking powder  ¨ MSG  ¨ Monosodium  ¨ Na (the chemical symbol for sodium)  Other ideas:  · Use more fresh food. Buy more fruits and vegetables.  · Select lean meats, fish, and poultry.  · Find a cookbook with low-salt recipes. Youll find ideas for tasty meals that are healthy for your heart.  ·   When eating out, ask questions about the menu. Tell the  you're on a low-salt diet.  ¨ If you order fish, chicken, beef, or pork, ask the  to have it broiled, baked, poached, or grilled without salt, butter, or breading.  ¨ Choose plain steamed rice, boiled noodles, and baked or boiled potatoes. Top potatoes with chives and a little sour cream instead of butter.  · Avoid antacids that are high in salt. Check the label before you buy.  Follow-up  Make a follow-up appointment with a nutritionist as directed by our staff.  Date Last Reviewed: 6/20/2015  © 3145-9615 [employer]. [address] [place] [address], [place] [address]. All rights reserved. This information is not intended as a substitute for professional medical care. Always follow your healthcare professional's instructions.        Low-Salt Choices  Eating salt (sodium) can make your  body retain too much water. Excess water makes your heart work harder. Canned, packaged, and frozen foods are easy to prepare, but they are often high in sodium. Here are some ideas for low-salt foods you can easily prepare yourself.    For breakfast  · Fruit or 100% fruit juice  · Whole-wheat bread or an English muffin. Compare sodium content on labels.  · Low-fat milk or yogurt  · Unsalted eggs  · Shredded wheat  · Corn tortillas  · Unsalted steamed rice  · Regular (not instant) hot cereal, made without salt  Stay away from:  · Sausage, chiu, and ham  · Flour tortillas  · Packaged muffins, pancakes, and biscuits  · Instant hot cereals  · Cottage cheese  For lunch and dinner  · Fresh fish, chicken, turkey, or meat--baked, broiled, or roasted without salt  · Dry beans, cooked without salt  · Tofu, stir-fried without salt  · Unsalted fresh fruit and vegetables, or frozen or canned fruit and vegetables with no added salt  Stay away from:  · Lunch or deli meat that is cured or smoked  · Cheese  · Tomato juice and catsup  · Canned vegetables, soups, and fish not labeled as no-salt-added or reduced sodium  · Packaged gravies and sauces  · Olives, pickles, and relish  · Bottled salad dressings  For snacks and desserts  · Yogurt  · Unsalted, air popped popcorn  · Unsalted nuts or seeds  Stay away from:  · Pies and cakes  · Packaged dessert mixes  · Pizza  · Canned and packaged puddings  · Pretzels, chips, crackers, and nuts--unless the label says unsalted  Date Last Reviewed: 6/17/2015  © 1727-0116 [employer] [address] [place] [address], [place] [address]. All rights reserved. This information is not intended as a substitute for professional medical care. Always follow your healthcare professional's instructions.        Tips for Using Less Salt    Most people with heart problems need to eat less salt (sodium). Reducing the amount of salt you eat may help control your blood pressure. The higher your blood  pressure, the greater your risk for heart disease, stroke, blindness, and kidney problems.  At the store  · Make low-salt choices by reading labels carefully. Look for the total amount of sodium per serving.  · Use more fresh food. Buy more fruits and vegetables. Select lean meats, fish, and poultry.  · Use fewer frozen, canned, and packaged foods which often contain a lot of sodium.  · Use plain frozen vegetables without sauces or toppings. These products are often low- or no-sodium.  · Opt for reduced-sodium or no-salt-added versions of canned vegetables and soups.  In the kitchen  · Don't add salt to food when you're cooking. Season with flavorings such as onion, garlic, pepper, salt-free herbal blends, and lemon or lime juice.  · Use a cookbook containing low-salt recipes. It can give you ideas for tasty meals that are healthy for your heart.  · Sprinkle salt-free herbal blends on vegetables and meat.  · Drain and rinse canned foods, such as canned beans and vegetables, before cooking or eating.  Eating out  · Tell the  you're on a low-salt diet. Ask questions about the menu.  · Order fish, chicken, and meat broiled, baked, poached, or grilled without salt, butter, or breading.  · Use lemon, pepper, and salt-free herb mixes to add flavor.  · Choose plain steamed rice, boiled noodles, and baked or boiled potatoes. Top potatoes with chives and a little sour cream.     Beware! Salt goes by many other names. Limit foods with these words listed as ingredients: salt, sodium, soy sauce, baking soda, baking powder, MSG, monosodium, Na (the chemical symbol for sodium). Some antacids are also high in salt.   Date Last Reviewed: 6/19/2015 © 2000-2017 [employer]. [address] [place] [address], [place] [address]. All rights reserved. This information is not intended as a substitute for professional medical care. Always follow your healthcare professional's instructions.

## 2017-12-04 NOTE — PROGRESS NOTES
Transitional Care Note  Subjective:       Patient ID: Ewelina Arnold is a 71 y.o. female.  Chief Complaint: Transitional Care (SOB/edema)    Family and/or Caretaker present at visit?  No.  Diagnostic tests reviewed/disposition: I have reviewed all completed as well as pending diagnostic tests at the time of discharge.  Disease/illness education: anemia, aortic stenosis, renal insufficiency  Home health/community services discussion/referrals: Patient does not have home health established from hospital visit.  They do not need home health.  If needed, we will set up home health for the patient.   Establishment or re-establishment of referral orders for community resources: No other necessary community resources.   Discussion with other health care providers: No discussion with other health care providers necessary.   HPI see above  Review of Systems   Constitutional: Negative.    HENT: Negative.    Eyes: Negative.    Respiratory: Negative.    Cardiovascular: Positive for leg swelling.   Gastrointestinal: Negative.    Endocrine: Negative.    Genitourinary: Negative.    Musculoskeletal: Positive for joint swelling.   Skin: Negative.    Allergic/Immunologic: Negative.    Neurological: Negative.    Hematological: Negative.    Psychiatric/Behavioral: Negative.        Objective:      Physical Exam   Constitutional: She is oriented to person, place, and time. She appears well-developed and well-nourished. No distress.   HENT:   Head: Normocephalic and atraumatic.   Eyes: Conjunctivae and EOM are normal. Pupils are equal, round, and reactive to light.   Neck: Normal range of motion. Neck supple. No JVD present.   Cardiovascular: Normal rate.    Pulmonary/Chest: Effort normal and breath sounds normal. No respiratory distress.   Abdominal: Soft. Bowel sounds are normal. She exhibits no distension. There is no tenderness.   Neurological: She is alert and oriented to person, place, and time. She displays normal reflexes. No  cranial nerve deficit or sensory deficit. She exhibits normal muscle tone. Coordination normal.   Skin: Skin is warm and dry. No rash noted. She is not diaphoretic. No erythema. No pallor.   Psychiatric: She has a normal mood and affect. Her behavior is normal. Judgment and thought content normal.   Nursing note and vitals reviewed.      Assessment:       1. Anemia, unspecified type    2. Malnutrition, unspecified type        Plan:     referring med card dated 2017  VITAMIN D2 50,000 unit capsule          triamcinolone acetonide 0.1% (KENALOG) 0.1 % paste         Patient taking differently: PLACE ONTO TEETH PRN, Reported on 2016       torsemide (DEMADEX) 20 MG Tab 20 mg, 2 times daily        TENS units Sravanthi 1 application, Daily PRN        rosuvastatin (CRESTOR) 40 MG Tab 40 mg, Nightly        omeprazole (PRILOSEC) 40 MG capsule 40 mg, Daily        nystatin-triamcinolone (MYCOLOG II) cream 4 times daily        nystatin (MYCOSTATIN) ointment 2 times daily        nitroGLYCERIN (NITROSTAT) 0.4 MG SL tablet 0.4 mg, Every 5 min PRN        multivitamin capsule 1 capsule, Daily        meclizine (ANTIVERT) 12.5 mg tablet 12.5 mg, 3 times daily PRN        levothyroxine (SYNTHROID) 75 MCG tablet 75 mcg, Before breakfast        latanoprost 0.005 % ophthalmic solution         irbesartan (AVAPRO) 300 MG tablet 300 mg, Nightly        ipratropium (ATROVENT) 0.03 % nasal spray 2 spray, 2 times daily PRN        hyoscyamine (LEVSIN/SL) 0.125 mg Subl 0.125 mg, 2 times daily PRN        hydroxychloroquine (PLAQUENIL) 200 mg tablet         hydrALAZINE (APRESOLINE) 10 MG tablet         fexofenadine (ALLEGRA) 180 MG tablet () 180 mg, Daily        Patient taking differently: 180 mg Oral As needed (PRN), Informant: Self, Reported on 2017       ENSURE ACTIVE CLEAR Liqd 240 mL, 2 times daily        conjugated estrogens (PREMARIN) vaginal cream         clopidogrel (PLAVIX) 75 mg tablet 75 mg, Daily        citalopram  (CELEXA) 20 MG tablet 20 mg, Daily        cholestyramine (QUESTRAN) 4 gram packet 4 g, 3 times daily with meals        carvedilol (COREG) 12.5 MG tablet 12.5 mg, 2 times daily with meals        butalbital-aspirin-caffeine -40 mg (FIORINAL) -40 mg Cap 1 capsule, Every 6 hours PRN        betamethasone dipropionate (DIPROLENE) 0.05 % cream 2 times daily PRN        aspirin (ECOTRIN) 81 MG EC tablet 81 mg, Daily        Patient taking differently: 81 mg Oral, (No frequency reported), 1 tablet every other day, Reported on 12/1/2017       ammonium lactate 12 % Crea 1 application, 2 times daily PRN             acetaminophen (TYLENOL) 500 MG tablet 500 mg, Every 6 hours PRN       Procedures Ordered This Visit    POCT urine dipstick without microscope         CBC auto differential         Ambulatory referral to Dermatology

## 2017-12-04 NOTE — TELEPHONE ENCOUNTER
Mrs Arnold is calling in her weight, 1 lb lower today.  She denies any issues with SOB, PEDRO.  She went to her exercise class today and feels a little more weak this morning than normal. No LH, DZ, weakness or cramping.  She will drink 48-50 oz of fluid today.  She is walking into her PCP's appt now.  She has no complaints or concerns today.  We will keep a close on her weight decline and consider reducing torsemide if she continues to drop weight.  She does mention that her NEERU is still fairly significant and noticeable each morning.  I have asked her to let us know if it begins to improve or worsen.  She has lost 4 lbs since discharge.  Weight: 54 kg (119 lb) (12/4/2017 10:29 AM)

## 2017-12-05 ENCOUNTER — TELEPHONE (OUTPATIENT)
Dept: OTHER | Facility: OTHER | Age: 72
End: 2017-12-05

## 2017-12-05 NOTE — TELEPHONE ENCOUNTER
Mrs Arnold has called in her weight this morning.  She was unsure whether to call in or wait for our call.  She will begin texting in her weights for review daily starting tomorrow.  We will reach out if her weight appears to have changed or for check-ins.  She feels well today, no SOB or NEERU.  We will follow along with her.  No changes to medications from clinic yesterday.  Weight: 54.4 kg (120 lb) (12/5/2017  9:02 AM)

## 2017-12-06 ENCOUNTER — TELEPHONE (OUTPATIENT)
Dept: OTHER | Facility: OTHER | Age: 72
End: 2017-12-06

## 2017-12-06 NOTE — TELEPHONE ENCOUNTER
"Mrs Arnold texted in a 5 lbs weight change overnight.  She says that she feels a little drained and feels that she "looks terrible."  She denies LH, DZ, weakness or cramping.  Reports brisk U/O.  She confirms that she has been using the correct torsemide BID rather than QD as she was PTA.  Her BP this morning was 151/71, 78.  She feels that her swelling in her feet is down a little, legs are "not as big" as during her clinic visit.  Still has some "tightness" around her ankles that she says is relieved after she elevates them.  She was planning on going to her exercise class today.  She did reweigh on the phone with me today, reports again 115 lbs.  Because of improved edema and weight loss we will reduce her torsemide to 20/10 for now.  She plans on going to or calling [employer] today about her torsemide supply as she is running low from her BID dosing.  Weight: 52.2 kg (115 lb) (12/6/2017  8:59 AM)    "

## 2017-12-07 ENCOUNTER — OFFICE VISIT (OUTPATIENT)
Dept: CARDIOLOGY | Facility: CLINIC | Age: 72
End: 2017-12-07
Payer: MEDICARE

## 2017-12-07 ENCOUNTER — PATIENT MESSAGE (OUTPATIENT)
Dept: CARDIOLOGY | Facility: CLINIC | Age: 72
End: 2017-12-07

## 2017-12-07 ENCOUNTER — TELEPHONE (OUTPATIENT)
Dept: CARDIOLOGY | Facility: CLINIC | Age: 72
End: 2017-12-07

## 2017-12-07 ENCOUNTER — LAB VISIT (OUTPATIENT)
Dept: LAB | Facility: HOSPITAL | Age: 72
End: 2017-12-07
Payer: MEDICARE

## 2017-12-07 VITALS
HEIGHT: 65 IN | HEART RATE: 61 BPM | BODY MASS INDEX: 20.24 KG/M2 | DIASTOLIC BLOOD PRESSURE: 83 MMHG | WEIGHT: 121.5 LBS | SYSTOLIC BLOOD PRESSURE: 182 MMHG | OXYGEN SATURATION: 97 %

## 2017-12-07 DIAGNOSIS — E46 PROTEIN-CALORIE MALNUTRITION, UNSPECIFIED SEVERITY: ICD-10-CM

## 2017-12-07 DIAGNOSIS — E89.0 POSTSURGICAL HYPOTHYROIDISM: Chronic | ICD-10-CM

## 2017-12-07 DIAGNOSIS — Z90.5 SOLITARY KIDNEY, ACQUIRED: ICD-10-CM

## 2017-12-07 DIAGNOSIS — I25.10 CORONARY ARTERY DISEASE INVOLVING NATIVE CORONARY ARTERY OF NATIVE HEART WITHOUT ANGINA PECTORIS: ICD-10-CM

## 2017-12-07 DIAGNOSIS — I27.20 PULMONARY HYPERTENSION: ICD-10-CM

## 2017-12-07 DIAGNOSIS — I73.9 PERIPHERAL ARTERIAL DISEASE: Chronic | ICD-10-CM

## 2017-12-07 DIAGNOSIS — E78.00 PURE HYPERCHOLESTEROLEMIA: ICD-10-CM

## 2017-12-07 DIAGNOSIS — I35.0 AORTIC STENOSIS, MODERATE: ICD-10-CM

## 2017-12-07 DIAGNOSIS — I77.1 TORTUOUS AORTA: ICD-10-CM

## 2017-12-07 DIAGNOSIS — I10 ESSENTIAL HYPERTENSION: Chronic | ICD-10-CM

## 2017-12-07 DIAGNOSIS — Z98.61 POST PTCA: Chronic | ICD-10-CM

## 2017-12-07 DIAGNOSIS — I70.0 AORTIC ATHEROSCLEROSIS: ICD-10-CM

## 2017-12-07 DIAGNOSIS — I77.9 BILATERAL CAROTID ARTERY DISEASE: ICD-10-CM

## 2017-12-07 DIAGNOSIS — I50.33 ACUTE ON CHRONIC DIASTOLIC HEART FAILURE: Primary | ICD-10-CM

## 2017-12-07 DIAGNOSIS — D64.9 ANEMIA, UNSPECIFIED TYPE: ICD-10-CM

## 2017-12-07 DIAGNOSIS — N18.4 ACUTE RENAL FAILURE SUPERIMPOSED ON STAGE 4 CHRONIC KIDNEY DISEASE, UNSPECIFIED ACUTE RENAL FAILURE TYPE: ICD-10-CM

## 2017-12-07 DIAGNOSIS — N17.9 ACUTE RENAL FAILURE SUPERIMPOSED ON STAGE 4 CHRONIC KIDNEY DISEASE, UNSPECIFIED ACUTE RENAL FAILURE TYPE: ICD-10-CM

## 2017-12-07 DIAGNOSIS — I50.33 ACUTE ON CHRONIC DIASTOLIC CONGESTIVE HEART FAILURE: ICD-10-CM

## 2017-12-07 PROBLEM — I50.9 CHF (CONGESTIVE HEART FAILURE): Status: RESOLVED | Noted: 2017-11-27 | Resolved: 2017-12-07

## 2017-12-07 PROBLEM — D63.8 ANEMIA OF OTHER CHRONIC DISEASE: Status: RESOLVED | Noted: 2017-07-25 | Resolved: 2017-12-07

## 2017-12-07 LAB
ALBUMIN SERPL BCP-MCNC: 3 G/DL
ALP SERPL-CCNC: 74 U/L
ALT SERPL W/O P-5'-P-CCNC: 20 U/L
ANION GAP SERPL CALC-SCNC: 8 MMOL/L
AST SERPL-CCNC: 48 U/L
BASOPHILS # BLD AUTO: 0.03 K/UL
BASOPHILS NFR BLD: 0.9 %
BILIRUB SERPL-MCNC: 0.3 MG/DL
BNP SERPL-MCNC: 1304 PG/ML
BUN SERPL-MCNC: 42 MG/DL
CALCIUM SERPL-MCNC: 8.9 MG/DL
CHLORIDE SERPL-SCNC: 105 MMOL/L
CO2 SERPL-SCNC: 28 MMOL/L
CREAT SERPL-MCNC: 2 MG/DL
DIFFERENTIAL METHOD: ABNORMAL
EOSINOPHIL # BLD AUTO: 0.1 K/UL
EOSINOPHIL NFR BLD: 3.8 %
ERYTHROCYTE [DISTWIDTH] IN BLOOD BY AUTOMATED COUNT: 14.2 %
EST. GFR  (AFRICAN AMERICAN): 28.3 ML/MIN/1.73 M^2
EST. GFR  (NON AFRICAN AMERICAN): 24.6 ML/MIN/1.73 M^2
GLUCOSE SERPL-MCNC: 73 MG/DL
HCT VFR BLD AUTO: 30.8 %
HGB BLD-MCNC: 9.5 G/DL
IMM GRANULOCYTES # BLD AUTO: 0.01 K/UL
IMM GRANULOCYTES NFR BLD AUTO: 0.3 %
LYMPHOCYTES # BLD AUTO: 1.2 K/UL
LYMPHOCYTES NFR BLD: 34.1 %
MAGNESIUM SERPL-MCNC: 1.4 MG/DL
MCH RBC QN AUTO: 25.1 PG
MCHC RBC AUTO-ENTMCNC: 30.8 G/DL
MCV RBC AUTO: 81 FL
MONOCYTES # BLD AUTO: 0.6 K/UL
MONOCYTES NFR BLD: 16.8 %
NEUTROPHILS # BLD AUTO: 1.5 K/UL
NEUTROPHILS NFR BLD: 44.1 %
NRBC BLD-RTO: 0 /100 WBC
PLATELET # BLD AUTO: 228 K/UL
PMV BLD AUTO: 9.6 FL
POTASSIUM SERPL-SCNC: 3.6 MMOL/L
PROT SERPL-MCNC: 7.3 G/DL
RBC # BLD AUTO: 3.79 M/UL
SODIUM SERPL-SCNC: 141 MMOL/L
WBC # BLD AUTO: 3.4 K/UL

## 2017-12-07 PROCEDURE — 99499 UNLISTED E&M SERVICE: CPT | Mod: S$GLB,,, | Performed by: NURSE PRACTITIONER

## 2017-12-07 PROCEDURE — 83880 ASSAY OF NATRIURETIC PEPTIDE: CPT

## 2017-12-07 PROCEDURE — 85025 COMPLETE CBC W/AUTO DIFF WBC: CPT

## 2017-12-07 PROCEDURE — 99214 OFFICE O/P EST MOD 30 MIN: CPT | Mod: S$GLB,,, | Performed by: NURSE PRACTITIONER

## 2017-12-07 PROCEDURE — 80053 COMPREHEN METABOLIC PANEL: CPT

## 2017-12-07 PROCEDURE — 83735 ASSAY OF MAGNESIUM: CPT

## 2017-12-07 PROCEDURE — 36415 COLL VENOUS BLD VENIPUNCTURE: CPT

## 2017-12-07 PROCEDURE — 99999 PR PBB SHADOW E&M-EST. PATIENT-LVL V: CPT | Mod: PBBFAC,,, | Performed by: NURSE PRACTITIONER

## 2017-12-07 RX ORDER — HYDRALAZINE HYDROCHLORIDE 50 MG/1
50 TABLET, FILM COATED ORAL EVERY 12 HOURS
Qty: 180 TABLET | Refills: 3 | Status: SHIPPED | OUTPATIENT
Start: 2017-12-07 | End: 2018-01-10 | Stop reason: SDUPTHER

## 2017-12-07 RX ORDER — HYDRALAZINE HYDROCHLORIDE 50 MG/1
50 TABLET, FILM COATED ORAL 3 TIMES DAILY
Qty: 90 TABLET | Refills: 11 | Status: SHIPPED | OUTPATIENT
Start: 2017-12-07 | End: 2017-12-07 | Stop reason: SDUPTHER

## 2017-12-07 RX ORDER — TORSEMIDE 20 MG/1
20 TABLET ORAL 2 TIMES DAILY
Qty: 180 TABLET | Refills: 3 | Status: ON HOLD | OUTPATIENT
Start: 2017-12-07 | End: 2018-01-16

## 2017-12-07 RX ORDER — IRBESARTAN 300 MG/1
300 TABLET ORAL NIGHTLY
Qty: 90 TABLET | Refills: 3 | Status: SHIPPED | OUTPATIENT
Start: 2017-12-07 | End: 2018-01-10 | Stop reason: SDUPTHER

## 2017-12-07 NOTE — TELEPHONE ENCOUNTER
Spoke to patient regarding LHC.  Rescheduled for 12/20/17.  Instructions reviewed and new written set mailed to patient.  Will need labs upon arrival.

## 2017-12-07 NOTE — PROGRESS NOTES
Ms. Arnold is a patient of Dr. Wyman and was last seen in [facility] 10/19/2017.    Subjective:   Patient ID:  Ewelina Arnold is a 71 y.o. female who presents for follow-up of Congestive Heart Failure (H/F program f/u ); Foot Swelling; and Shortness of Breath (with exertion )    Problems:   HFpEF, EF 55-60%  CKD stage   HLD  HTN  Hypothyroidism  PAD  Living donor, kidney in 1985  45 pack year h/o smoking, quit ~2579-5634  Moderate aortic stenosis, PABLITO = 0.93 cm2, peak velocity = 3.5 m/s, mean gradient = 28 mmHg.   Pulmonary hypertension, 68 mmHg  CAD based on CT chest in 9/2016  Aortic atherosclerosis  Carotid artery disease, left (1-39% in 2012)    HPI  Ms. Arnold is in clinic following admission for HFrEF from 11/27-11/30.  Her admission weight was 135lbs and today's weight is 121, net loss 14 pounds.  Patient denies chest pain with exertion or at rest, palpitations, SOB, PEDRO, dizziness, syncope, orthopnea, PND, or claudication.  Reports no routine exercise.  Reports significant improvement in PEDRO but continued lower extremity swelling.  While in-patient, she was being evaluated for the prospect of aortic valve replacement.  She does not meet criteria for TAVR.  Dr. Ly had mentioned a [facility] to evaluate coronary arteries given calcification seen on CT chest and to get more precise pressures.     Review of Systems   Constitution: Positive for decreased appetite, weakness and weight loss. Negative for diaphoresis, malaise/fatigue and weight gain.   Eyes: Negative for visual disturbance.   Cardiovascular: Positive for leg swelling. Negative for chest pain, claudication, dyspnea on exertion, irregular heartbeat, near-syncope, orthopnea, palpitations, paroxysmal nocturnal dyspnea and syncope.        Denies chest pressure   Respiratory: Negative for cough, hemoptysis, shortness of breath, sleep disturbances due to breathing and snoring.    Endocrine: Negative for cold intolerance and heat intolerance.    Hematologic/Lymphatic: Negative for bleeding problem. Does not bruise/bleed easily.   Musculoskeletal: Negative for myalgias.   Gastrointestinal: Negative for bloating, abdominal pain, anorexia, change in bowel habit, constipation, diarrhea, nausea and vomiting.   Neurological: Negative for difficulty with concentration, disturbances in coordination, excessive daytime sleepiness, dizziness, headaches, light-headedness, loss of balance and numbness.   Psychiatric/Behavioral: The patient does not have insomnia.      Allergies and current medications updated and reviewed:  Review of patient's allergies indicates:   Allergen Reactions    Ace inhibitors      Other reaction(s): Lips Swelling    Vioxx [rofecoxib]      Other reaction(s): lips swelling    Bactrim [sulfamethoxazole-trimethoprim]      Pt would like this medication to be added due to elevation of creatinine with single kidney.    Arthrotec 50 [diclofenac-misoprostol]      Other reaction(s): Unknown    Bentyl [dicyclomine]      Not effective    Doxycycline      nausea    Imdur [isosorbide mononitrate] Nausea Only    Lomotil [diphenoxylate-atropine]      Stomach cramps, pt vomitted    Lozol [indapamide] Rash    Tramadol Nausea Only     Current Outpatient Prescriptions   Medication Sig    acetaminophen (TYLENOL) 500 MG tablet Take 500 mg by mouth every 6 (six) hours as needed for Pain.    ammonium lactate 12 % Crea Apply 1 application topically 2 (two) times daily as needed.    butalbital-aspirin-caffeine -40 mg (FIORINAL) -40 mg Cap Take 1 capsule by mouth every 6 (six) hours as needed.     carvedilol (COREG) 12.5 MG tablet Take 1 tablet (12.5 mg total) by mouth 2 (two) times daily with meals.    citalopram (CELEXA) 20 MG tablet TAKE 1 TABLET (20 MG TOTAL) BY MOUTH ONCE DAILY.    clopidogrel (PLAVIX) 75 mg tablet Take 1 tablet (75 mg total) by mouth once daily.    conjugated estrogens (PREMARIN) vaginal cream Use 1g nightly for two  weeks, then 1g twice per week.    ENSURE ACTIVE CLEAR Liqd Take 240 mLs by mouth 2 (two) times daily.    hydrALAZINE (APRESOLINE) 10 MG tablet TAKE 2 TABLETS EVERY 12 HOURS    hydroxychloroquine (PLAQUENIL) 200 mg tablet TAKE 1 TABLET TWICE DAILY    hyoscyamine (LEVSIN/SL) 0.125 mg Subl Place 1 tablet (0.125 mg total) under the tongue 2 (two) times daily as needed.    ipratropium (ATROVENT) 0.03 % nasal spray 2 sprays by Nasal route 2 (two) times daily as needed for Rhinitis.    irbesartan (AVAPRO) 300 MG tablet Take 1 tablet (300 mg total) by mouth every evening.    latanoprost 0.005 % ophthalmic solution INSTILL 1 DROP INTO BOTH EYES EVERY DAY    levothyroxine (SYNTHROID) 75 MCG tablet Take 1 tablet (75 mcg total) by mouth before breakfast.    meclizine (ANTIVERT) 12.5 mg tablet Take 1 tablet (12.5 mg total) by mouth 3 (three) times daily as needed.    multivitamin capsule Take 1 capsule by mouth once daily.    nitroGLYCERIN (NITROSTAT) 0.4 MG SL tablet Place 1 tablet (0.4 mg total) under the tongue every 5 (five) minutes as needed for Chest pain.    nystatin (MYCOSTATIN) ointment Apply topically 2 (two) times daily.    nystatin-triamcinolone (MYCOLOG II) cream Apply topically 4 (four) times daily.    omeprazole (PRILOSEC) 40 MG capsule Take 1 capsule (40 mg total) by mouth once daily.    rosuvastatin (CRESTOR) 40 MG Tab Take 1 tablet (40 mg total) by mouth every evening.    TENS units Sravanthi 1 application by Misc.(Non-Drug; Combo Route) route daily as needed (pain).    torsemide (DEMADEX) 20 MG Tab Take 1 tablet (20 mg total) by mouth 2 (two) times daily.    triamcinolone acetonide 0.1% (KENALOG) 0.1 % paste PLACE ONTO TEETH TWICE DAILY (Patient taking differently: PLACE ONTO TEETH PRN)    VITAMIN D2 50,000 unit capsule TAKE 1 CAPSULE (50,000 UNITS TOTAL) EVERY 30 DAYS    aspirin (ECOTRIN) 81 MG EC tablet Take 1 tablet (81 mg total) by mouth once daily. (Patient taking differently: Take 81 mg by  "mouth. 1 tablet every other day)    betamethasone dipropionate (DIPROLENE) 0.05 % cream Apply topically 2 (two) times daily as needed.    cholestyramine (QUESTRAN) 4 gram packet Take 1 packet (4 g total) by mouth 3 (three) times daily with meals.    fexofenadine (ALLEGRA) 180 MG tablet Take 1 tablet (180 mg total) by mouth once daily. (Patient taking differently: Take 180 mg by mouth as needed. )     No current facility-administered medications for this visit.      Objective:     Right Arm BP - Sittin/81 (17 1059)  Left Arm BP - Sittin/83 (17 1059)    BP (!) 182/83 (BP Location: Left arm, Patient Position: Sitting, BP Method: Small (Automatic))   Pulse 61   Ht 5' 5" (1.651 m)   Wt 55.1 kg (121 lb 7.6 oz)   LMP  (LMP Unknown)   SpO2 97%   BMI 20.21 kg/m²     Physical Exam   Constitutional: She is oriented to person, place, and time. Vital signs are normal. She appears well-developed and well-nourished. She is active. No distress.   HENT:   Head: Normocephalic and atraumatic.   Eyes: Conjunctivae and lids are normal. No scleral icterus.   Neck: Neck supple. JVD present. Normal carotid pulses and no hepatojugular reflux present. Carotid bruit is not present.   Cardiovascular: Normal rate, regular rhythm, S1 normal, S2 normal and intact distal pulses.  PMI is not displaced.  Exam reveals no gallop and no friction rub.    Murmur heard.   Harsh mid to late systolic murmur is present with a grade of 3/6  at the upper right sternal border radiating to the neck  Pulses:       Carotid pulses are 2+ on the right side, and 2+ on the left side.       Radial pulses are 2+ on the right side, and 2+ on the left side.        Dorsalis pedis pulses are 2+ on the right side, and 2+ on the left side.        Posterior tibial pulses are 2+ on the right side, and 2+ on the left side.   Pulmonary/Chest: Effort normal and breath sounds normal. No respiratory distress. She has no decreased breath sounds. She " has no wheezes. She has no rhonchi. She has no rales. She exhibits no tenderness.   Abdominal: Soft. Normal appearance and bowel sounds are normal. She exhibits no distension, no fluid wave, no abdominal bruit, no ascites and no pulsatile midline mass. There is no hepatosplenomegaly. There is no tenderness.   Musculoskeletal: She exhibits edema (BLE +2 to 3 extending to knees.).   Neurological: She is alert and oriented to person, place, and time. Gait normal.   Skin: Skin is warm, dry and intact. No rash noted. She is not diaphoretic. Nails show no clubbing.   Psychiatric: She has a normal mood and affect. Her speech is normal and behavior is normal. Judgment and thought content normal. Cognition and memory are normal.   Nursing note and vitals reviewed.      Chemistry        Component Value Date/Time     12/07/2017 1048    K 3.6 12/07/2017 1048     12/07/2017 1048    CO2 28 12/07/2017 1048    BUN 42 (H) 12/07/2017 1048    CREATININE 2.0 (H) 12/07/2017 1048    GLU 73 12/07/2017 1048        Component Value Date/Time    CALCIUM 8.9 12/07/2017 1048    ALKPHOS 74 12/07/2017 1048    AST 48 (H) 12/07/2017 1048    ALT 20 12/07/2017 1048    BILITOT 0.3 12/07/2017 1048    ESTGFRAFRICA 28.3 (A) 12/07/2017 1048    EGFRNONAA 24.6 (A) 12/07/2017 1048          Recent Labs  Lab 03/23/17  0952  11/17/17  2118  11/28/17  0433  12/07/17  1048   WHITE BLOOD CELL COUNT 3.72 L  < > 3.61 L  < > 4.68  < > 3.40 L   HEMOGLOBIN 9.9 L  < > 9.7 L  < > 9.1 L  < > 9.5 L   HEMATOCRIT 30.8 L  < > 30.9 L  < > 28.5 L  < > 30.8 L   MCV 83  < > 80 L  < > 81 L  < > 81 L   PLATELETS 209  < > 217  < > 215  < > 228   BNP  --   --  1,073 H  --  2,331 H  --  1,304 H   TSH 0.935  < >  --   --  2.453  --   --    CHOLESTEROL 123  --   --   --   --   --   --    HDL 39 L  --   --   --   --   --   --    LDL CHOLESTEROL 68.2  --   --   --   --   --   --    TRIGLYCERIDES 79  --   --   --   --   --   --    HDL/CHOLESTEROL RATIO 31.7  --   --   --   --    --   --    < > = values in this interval not displayed.  Lab Results   Component Value Date    IRON 31 11/29/2017    TIBC 317 11/29/2017    FERRITIN 202 11/29/2017         Recent Labs  Lab 11/27/17  1200   INR 1.0        Test(s) Reviewed  I have reviewed the following in detail:  [] Stress test   [] Angiography   [x] Echocardiogram   [x] Labs   [] Other:       Assessment/Plan:     Acute on chronic diastolic heart failure  Comments:  NAD. Not euvolemic based on exam. Continue current dose of diuretic as she continues to lose weight.   Orders:  -     torsemide (DEMADEX) 20 MG Tab; Take 1 tablet (20 mg total) by mouth 2 (two) times daily. (Patient taking differently: Take 20 mg by mouth. )  Dispense: 180 tablet; Refill: 3    Essential hypertension  Comments:  Not controlled. Increase hydralazine to 50mg BID. Instructed to check BP daily and email in 1-2 weeks with BP log.   Orders:  -     Discontinue: hydrALAZINE (APRESOLINE) 50 MG tablet; Take 1 tablet (50 mg total) by mouth 3 (three) times daily.  Dispense: 90 tablet; Refill: 11  -     irbesartan (AVAPRO) 300 MG tablet; Take 1 tablet (300 mg total) by mouth every evening.  Dispense: 90 tablet; Refill: 3  -     hydrALAZINE (APRESOLINE) 50 MG tablet; Take 1 tablet (50 mg total) by mouth every 12 (twelve) hours.  Dispense: 180 tablet; Refill: 3    Coronary artery disease involving native coronary artery of native heart without angina pectoris  Comments:  Asymptomatic. Taking high intensity statin therapy and plavix 75mg. No changes.     Aortic atherosclerosis    Post PTCA    Aortic stenosis, moderate  Comments:  Monitor for progression of sx or change in exam    Pulmonary hypertension    Bilateral carotid artery disease  Comments:  Mild disease noted in 2012. No bruit on exam. Taking high intensity statin and plavix. No changes.    Pure hypercholesterolemia  Comments:  LDL at goal <70. Continue rosuvastatin 40mg.    Peripheral arterial disease  Comments:  Asymptomatic.  Encouraged to work up to walking 30minutes a day for 4-5 days a week to develop collateral vessels.     Tortuous aorta    Acute renal failure superimposed on stage 4 chronic kidney disease, unspecified acute renal failure type  Comments:  Follow up scheduled with Dr. Ruffin in nephrology ojn 12/26/17.     Solitary kidney, acquired    Protein-calorie malnutrition, unspecified severity  Comments:  Low albumin and cachectic on exam.     Postsurgical hypothyroidism  Comments:  TSH wnl 11/2017. Follow up with PCP as planned.         Return in about 4 weeks (around 1/4/2018).

## 2017-12-07 NOTE — PATIENT INSTRUCTIONS
Restrict salt to no more than 2,000mg a day  -No more than 100mg of salt in a snack  -No more than 250mg of salt in an entree  -No more than 500mg of salt in an entire meal    Increase cardiovascular exercise to 30 minutes of brisk walking a day for 4-5 days a week.  You can use a stationary bike or swim for 30 minutes a day instead of walking.  Whatever exercise you choose, make sure you are working hard enough to increase your heart rate.     Increase your hydralazine to 50mg two times a day.  You can take 5 of the 10mg tablets two times a day until you run out.

## 2017-12-08 ENCOUNTER — TELEPHONE (OUTPATIENT)
Dept: OTHER | Facility: OTHER | Age: 72
End: 2017-12-08

## 2017-12-08 NOTE — TELEPHONE ENCOUNTER
Mrs. Chen's weight is the same today.  She was seen in clinic yesterday, with stable labs (slight improvement in BNP) and assessment + BLE (+2/3) up to knees, +JVD despite consistent weight loss.  Recommendation was to continue current diuretic regimen with [facility] scheduled 12/20.  She confirms change to hydralazine dose and will hold the metoprolol she just received from mail order, since she is already taking carvedilol (see email to Garo).  She has no new issues to report today, is doing an exceptional job keeping track of her medications and diet and knows to call over the weekend with any changes.  She will remain on torsemide 20/10 for now.  She will continue to text in her weights and I have asked that she consider buying a digital scale to help provide more accurate weights for monitoring purposes.   Weight: 52.2 kg (115 lb) (12/8/2017 10:25 AM)

## 2017-12-11 ENCOUNTER — OUTPATIENT CASE MANAGEMENT (OUTPATIENT)
Dept: ADMINISTRATIVE | Facility: OTHER | Age: 72
End: 2017-12-11

## 2017-12-11 ENCOUNTER — TELEPHONE (OUTPATIENT)
Dept: OTHER | Facility: OTHER | Age: 72
End: 2017-12-11

## 2017-12-11 ENCOUNTER — TELEPHONE (OUTPATIENT)
Dept: OPHTHALMOLOGY | Facility: CLINIC | Age: 72
End: 2017-12-11

## 2017-12-11 NOTE — TELEPHONE ENCOUNTER
----- Message from Lisa Cartagena sent at 12/11/2017 11:50 AM CST -----  Contact: Pt  Ms. Arnold says that she has been seeing a black cloud over her eyes whenever she opens her eyes in the morning when she wakes up. It only happens in the morning. She would like to know whether to see Dr. Mccray about this or Dr. Collazo. She can be reached at [phone]

## 2017-12-11 NOTE — PROGRESS NOTES
Progress towards goals:  Pt called and she is now at home.    Call to pt and she asked for call back in 30 minutes    LT goal 1  Patient/caregiver will accept life style changes to manage and improve symptoms of chf prior to discharge from [facility]. - Priority: high  Short Term Goals  1.  Patient/caregiver will measure and record weights daily and notify doctor if patient gains more than 3 pounds in one day or 5 pounds in one week within 1 month.  Partially met   Recognize and provide educational material (BRANDON).  Heart Failure: Warning signs of a Flare Up, [facility]s Discharge instructions for Patients with Heart Failure.      Reviewed handout with pt and she verbalized understanidng                                                                                                      2.  Patient/Caregiver will verbalize importance of early intervention for symptoms of CHF and verbalize 2 ways of preventing complications due to disease process within 1 month       Partially met   Recognize and provide educational material (BRANDON).  Low - Salt Choices, Managing your Heart failure. Taking medicine to Control Heart Failure.    Pt went to cardiology visit on 12/7/17 and blood pressure was 182/83 medication changed noted and pt states she is now taking hydralazine 50 mg q 12 hours.  Pt states her blood pressure since have been 122/68 to 136/72   Pt states wt is up 1 pound to 116 today.  She still has swelling in legs but prior to hospital she was taking amlodipine and historically this can lead to lower ext swelling.  She states it was discontinued and she feels that swelling has improved some.  Apresoline is also a vasodilator but leg swelling is not listed in side effects.  Advised pt to increase walking as tolerated as per advise on after visit summary with cardiology.  Also instructed that walking will increase blood flow to legs and assist with decreasing the swelling possibly.  Pt states she is attending the  gym but they are mostly doing chair exercises.  She states she will try to start walking on treadmill.  Advised her to start with about 3 to 5 minutes at a modest pace and increase gradually as tolerated.  Pt verbalized understanding.  Pt is very open to education and suggestions for self improvement.    LT goal 2   Patient/caregiver will have knowledge of resources/benefits available from [employer] care policy in order to obtain the services/benefits that patient is eligible for or needs prior to discharge from [facility].    Short Term Goals  1.  Patient/caregiver will verbalize understanding of appropriate use of [employer] health care benefits within 2 months.   In progress         Patient states she received her meals from [employer] last week  Sodium content is only 426 per serving.  Pt states her new cardiac plus plan is going into effect on jan 1 2018 and this will be a big help for her copay reduction with all the specialist she has to see    Plan   Continue to review the information mailed to pt  Follow up on receipt of the my symptom chart  Follow up on wt and blood pressure  Follow up on start of walking on treadmill       Clinical Reference Documents Added to Patient Instructions       Document    DIET, DISCHARGE INSTRUCTIONS FOR EATING A LOW-SALT  (ENGLISH)    HEART FAILURE, CONGESTIVE (CHF) (ENGLISH)    HEART FAILURE, DISCHARGE INSTRUCTIONS FOR (ENGLISH)    HEART FAILURE: BEING ACTIVE (ENGLISH)    HEART FAILURE: MAKING CHANGES TO YOUR DIET (ENGLISH)    HEART FAILURE: TRACKING YOUR WEIGHT (ENGLISH)    HEART FAILURE: WARNING SIGNS OF A FLARE-UP (ENGLISH)    LOW-SALT CHOICES (ENGLISH)    MY HEART FAILURE SYMPTOMS CHART (ENGLISH)    SALT, TIPS FOR USING LESS (ENGLISH)

## 2017-12-11 NOTE — TELEPHONE ENCOUNTER
Mrs Arnold has texted in a stable weight.  She denies any symptom changes with swelling or breathing.  I will check in with her this week with a phone call.  Weight: 52.6 kg (116 lb) (12/11/2017 10:10 AM)

## 2017-12-12 ENCOUNTER — OUTPATIENT CASE MANAGEMENT (OUTPATIENT)
Dept: ADMINISTRATIVE | Facility: OTHER | Age: 72
End: 2017-12-12

## 2017-12-12 ENCOUNTER — TELEPHONE (OUTPATIENT)
Dept: NEPHROLOGY | Facility: CLINIC | Age: 72
End: 2017-12-12

## 2017-12-12 DIAGNOSIS — M32.9 LUPUS (SYSTEMIC LUPUS ERYTHEMATOSUS): Chronic | ICD-10-CM

## 2017-12-12 RX ORDER — ROSUVASTATIN CALCIUM 40 MG/1
TABLET, COATED ORAL
Qty: 90 TABLET | Refills: 3 | Status: ON HOLD | OUTPATIENT
Start: 2017-12-12 | End: 2018-03-28 | Stop reason: HOSPADM

## 2017-12-12 NOTE — LETTER
December 12, 2017    Ewelina Arnold  [address]  [facility] [address]             Outpatient Case Management  [address]  [facility] [address] Dear Ewelina Arnold:    We understand that receiving many services from different doctors and healthcare providers is overwhelming. There are appointments to make, transportation to arrange, dietary instructions to understand, and new medications to obtain.    This is where [facility] can help.     You are eligible to receive Outpatient Case Management services when you have healthcare needs that require the coordination of many providers, treatments, and services. You also qualify if you need assistance with a new treatment plan.     There is no charge for this support. You may have been referred to this program from your doctor(s), hospital staff member(s), or insurance company but you always have a choice to participate or not participate. To participate, you must give us your permission to be enrolled.     When you are enrolled in the [facility] program, the  who is assigned to you is    Nimo Mercado RN  [phone]    Depending on your needs and wishes, your  may speak with you by phone, visit you at your place of living (for example your home, skilled nursing facility, or rehabilitation facility), or meet you at your doctors office.     Your  will tell you why you have been selected to participate in the program and will complete an assessment of your needs. Then a personalized plan of care will be developed with you and or your caregiver.             Here are examples of the services your [facility]  provides.     Coordinate communication among multiple providers.   Arrange for transportation, doctors visits, durable medical equipment, home care services, and special clinics.    Provide coaching on how to manage your health condition.    Answer  questions about your health condition.   Help you understand your doctors treatment plan.    Provide additional instruction about your health condition, treatments, and medications.    Help you obtain information about your insurance coverage.    Advocate for your individual needs.    Request a Licensed Clinical  (LCSW) to visit you if you need their services. LCSWs help with long term planning (discussing placement options, advanced planning directives), financial planning, and assistance (for example rent, utilities, medication funding).     Your  will coordinate their activities with other outpatient services you are receiving. All services provided by [facility]  are coordinated with and communicated to your primary care physician.    Our goal is to help you manage your health condition(s) safely within your living environment, whether that is your home or a medical facility. We want to help you function at the healthiest and highest level possible.     Sincerely,      Sinan Spaulding MD  Medical Director    Enclosures:    Frequently Asked Questions  Patient Rights and Responsibilities   Reporting a Grievance or Complaint  Consent/Release of Information  Stamped Addressed Envelope                  Frequently Asked Questions about [facility]    What is [facility]?  Outpatient Case management is not Home healthcare services. [facility]  do not provide hands-on care. [facility]  will work with your doctor to arrange for home health services, if needed. Home health services have a limited duration and there are some restrictions as to who can get these services. There is no prescribed limit to the amount of time you receive [facility] services. [facility]  are not agents of your insurance company. However, [facility] Case  Managers can help you obtain information from your insurance company.     Who are the [facility] ?  [facility]  are Registered Nurses and Social Workers. It is important to remember that you and your  are a team that works together with your primary care physician to create your individualized plan of care. The ultimate goal of your care plan is to help you implement your doctors treatment plan and to help you function at the highest level of health possible.     What are my rights as a patient?  It is important for you to know and understand your rights and responsibilities while receiving services from the [facility] program. We have enclosed a complete description of your rights and responsibilities. You can help to make your care more effective when you understand your right and responsibilities.     What is needed to be enrolled in the program?  You are only enrolled in the [facility] Program when you give us your consent to participate. You will find enclosed a consent form. You are receiving this letter because you or your caregivers have given us a verbal consent to enroll you in [facility] Program. We ask that you sign and return the enclosed written consent in the stamped self-addressed envelope.                           Patient Rights and Responsibilities    We consider you a partner in your care. When you are well informed, participate in treatment decisions and communicate openly with your doctor and other healthcare professionals, you help make your care more effective.     While you are in the Outpatient Case Management Program, your rights include the following:     You have a right to be provided services in a non-discriminatory manner in accordance with the provisions of Title VI of the Civil Rights Act of 1964, Section 504 of the Rehabilitation Act of 1973, the Age  Discrimination Act of 1975, the Americans with Disabilities Act as well as any other applicable Federal and State laws and regulations.     You have the right to a reasonable, timely response to your request or need for care, as well as the right to considerate and respectful care including an environment that preserves dignity and contributes to a positive self-image. You are responsible for being considerate and respectful of our staff.     You have a right to information regarding patient rights, advocacy services and complaint mechanisms, and the right to prompt resolution of any complaint. You or a designee has the right to participate in the resolution of ethical issues surrounding your care. You have a right to file a complaint if you feel that your rights have been infringed, without fear or penalty from [facility] or the federal government. You may file a complaint with the Director of Outpatient Case Management by calling [phone]. At any time, you may lodge a grievance with the [facility] and [facility] by calling [phone], or the Joint Commission on Accreditation of Healthcare Organizations at [phone].     You, or someone acting on your behalf, have the right to understandable information on your health status, treatment and progress in order to make decisions. You have the right to know the nature, risks and alternatives to treatment. You have the right to be informed, when appropriate, regarding the outcome of the care that has been provided. You have the right to refuse treatment to the extent permitted by law, and the right to be informed of the alternatives and consequences of refusing treatment.     You, in collaboration with your physician, have the right to make decisions regarding care and the right to participate in the development and implementation of the plan of care and effective pain management. You have the right to know the name and professional status of  those responsible for the delivery of your care and treatment.       You have a right, within legal guidelines, to have a guardian, next-of-kin or legal designee exercises your patient rights when you are unable to do so. You have the right for your wishes regarding end-of-life decisions to be addressed by the healthcare team through advance directives. You have the right to personal privacy and confidentiality and to expect confidentiality of all records and communications pertaining to your care.      You have the right to receive communications about your health information confidentially. You have the right to request restrictions on the uses and disclosures of your health information. You have the right to inspect, copy, request amendments and receive an accounting of to whom we have disclosed your health information.     You have the right to be provided with interpretation services if you do not speak English; to alternative communication techniques if you are hearing or vision impaired; and to have any other resources needed on your behalf to ensure effective communication. These services are provided free of charge.     You have a right to personal safety (free from mental, physical, sexual and verbal abuse, neglect and exploitation). You have the right to access protective and advocacy services.     Advance Directives  A Patient Advocate is available to meet with patients to answer questions regarding advance directives.    Living Will  A document that outlines what medical treatment the patient does or does not want in the event the patient becomes unable to make those decisions at the appropriate time.    Durable Medical Power of   A document by which the patient designates an individual to be responsible for making medical decisions in the event the patient becomes unable to do so.    HIPAA Notice of Privacy Practices  Your medical information is governed by federal privacy laws. HIPAA  protects private medical information and how that information is disclosed. If you have a question regarding the HIPAA Notice of Privacy Practices, or if you believe your privacy rights have been violated, you may call our designated hotline at ([phone].            Quality Improvement  Because we consistently strive to improve the care and service provided to our patients, we welcome your feedback. Your comments are an important part of our quality improvement process, as we like to know what we are doing right and which areas are in need of improvement. Our policy is to listen, be responsive and provide you with an appropriate and timely follow-up to your questions or concerns. Our goal is active patient and family involvement in all aspects of the care process.          Reporting a Grievance or Complaint    During your time with the [facility] team you may have a grievance or complaint with our services. Your Patients Bill of Rights gives patients, families, and caregivers the right to express concerns and grievances and the right to expect a reasonable and timely response.     Your presentation of your concerns is not viewed negatively. It is an opportunity for us to improve the quality of our care and services we provide to you.     You may report your concerns directly to your , or you can phone in a complaint to:     Director of Outpatient Case Management  [phone]    You may also send a complaint letter to:    Director of Outpatient Case Management Services  [address] [place] [id number]    Tell us the details of your complaint and provide us with a contact phone number so we can contact you to obtain additional information. We will return a call to you within two business days of our receipt of your complaint, and to request additional information as needed. If you choose to mail a letter, your complaint may take a few days longer to reach us.      All grievances will be addressed as quickly as possible. A grievance or complaint that involves situations or practices which place patients in immediate danger will be addressed as an urgent matter. We will work to resolve all other complaints within seven days of receipt. By that time, you will receive a phone call with either the resolution of your complaint, or a plan for corrective action. A formal written response will be sent to you within 30 days of receipt of your grievance.     If a resolution cannot be completed within 30 days, a letter will be sent to you or your family member with an estimated time for the final response.    Additionally, all patients have the right to file complaints with external agencies, without exception. Complaints/grievances can be addressed to the following agencies:            Patient Safety or Quality of Care Concerns  Office of Quality Monitoring   [facility] [place] [address]  [phone] Toll Free    HIPPA Privacy/Security Concerns  Office for Civil Rights Region IV  [place] Department of Health & Human Services  [address], [address]  [place] [address] [phone] Phone  [phone] TDD  [phone] Toll Free    Medicare/Medicaid Billing Concerns  [employer] Services  Region 6  [address], [address]  [place] [address] [phone] Phone  [phone] Toll Free    General Concerns  [place] [facility] ([facility])  [phone] Toll Free Complaint Hotline                                        Consent Form/Release of Information    By signing--     (1) I agree I have read the Outpatient Case Management information provided to me;     (2) I agree to voluntarily participate in the Outpatient Case Management program;     (3) I understand I must consent to participation in the Outpatient Case Management program during my first interview with my Case  Manager;    (4) I consent to the discussion and release of my personal health information to my healthcare team (including my personal physician, my medical home care team, any specialty physician(s), and my [facility] team);     (5) I agree my consent is valid for the length of time I am receiving Outpatient Case Management;    (6) I agree to referrals to community resources which my Case Management team recommends for me. I agree to the release of my personal information and personal health information as necessary to referral sources.    ___________________________________________________________________  Patients Printed Name     ___________________________________________________________________  Patients Signature       Date    If patient is in being cared for, please complete this section:     ___________________________________________________________________  Printed Name of Person Caring For Patient   Relationship To Patient    ___________________________________________________________________   Signature of Person Caring For Patient     Date    PLEASE SIGN AND RETURN IN THE ENCLOSED PRE-ADDRESSED ENVELOPE.

## 2017-12-12 NOTE — PROGRESS NOTES
Please note an Outpatient Complex Care Management welcome packet and consent form was created and mailed to the patient on 12/12/17.    Please contact [facility] at sal. [phone] with any questions.    Thank you,    Sapna Johnson, SSC

## 2017-12-13 ENCOUNTER — OFFICE VISIT (OUTPATIENT)
Dept: OPHTHALMOLOGY | Facility: CLINIC | Age: 72
End: 2017-12-13
Payer: MEDICARE

## 2017-12-13 ENCOUNTER — LAB VISIT (OUTPATIENT)
Dept: LAB | Facility: HOSPITAL | Age: 72
End: 2017-12-13
Attending: INTERNAL MEDICINE
Payer: MEDICARE

## 2017-12-13 ENCOUNTER — TELEPHONE (OUTPATIENT)
Dept: OTHER | Facility: OTHER | Age: 72
End: 2017-12-13

## 2017-12-13 DIAGNOSIS — H04.123 INSUFFICIENCY OF TEAR FILM OF BOTH EYES: Primary | ICD-10-CM

## 2017-12-13 DIAGNOSIS — H53.433 ARCUATE SCOTOMA OF BOTH EYES: ICD-10-CM

## 2017-12-13 DIAGNOSIS — N18.30 CHRONIC KIDNEY DISEASE (CKD), STAGE III (MODERATE): ICD-10-CM

## 2017-12-13 LAB
ALBUMIN SERPL BCP-MCNC: 3.3 G/DL
ANION GAP SERPL CALC-SCNC: 9 MMOL/L
BUN SERPL-MCNC: 51 MG/DL
CALCIUM SERPL-MCNC: 9.3 MG/DL
CHLORIDE SERPL-SCNC: 102 MMOL/L
CO2 SERPL-SCNC: 27 MMOL/L
CREAT SERPL-MCNC: 2.3 MG/DL
EST. GFR  (AFRICAN AMERICAN): 23.9 ML/MIN/1.73 M^2
EST. GFR  (NON AFRICAN AMERICAN): 20.8 ML/MIN/1.73 M^2
GLUCOSE SERPL-MCNC: 79 MG/DL
PHOSPHATE SERPL-MCNC: 3.8 MG/DL
POTASSIUM SERPL-SCNC: 3.9 MMOL/L
SODIUM SERPL-SCNC: 138 MMOL/L

## 2017-12-13 PROCEDURE — 36415 COLL VENOUS BLD VENIPUNCTURE: CPT

## 2017-12-13 PROCEDURE — 99999 PR PBB SHADOW E&M-EST. PATIENT-LVL III: CPT | Mod: PBBFAC,,, | Performed by: OPHTHALMOLOGY

## 2017-12-13 PROCEDURE — 80069 RENAL FUNCTION PANEL: CPT

## 2017-12-13 PROCEDURE — 92014 COMPRE OPH EXAM EST PT 1/>: CPT | Mod: S$GLB,,, | Performed by: OPHTHALMOLOGY

## 2017-12-13 RX ORDER — HYDROXYCHLOROQUINE SULFATE 200 MG/1
TABLET, FILM COATED ORAL
Qty: 180 TABLET | Refills: 1 | Status: SHIPPED | OUTPATIENT
Start: 2017-12-13 | End: 2018-05-15 | Stop reason: SDUPTHER

## 2017-12-13 NOTE — PROGRESS NOTES
HPI     Triage/Nuss[facility] pt  Pt notice seeing black clouds/lashes in the top part of vision x 3 days.  Usually see it when she close then open eyes in the morning mainly, but   didn't see it this morning.  No pain   Eye Drops:Latanoprost Q HS OU                     Artificial Tears qd OU     I have personally interviewed the patient, reviewed the history and   examined the patient and agree with the technician's exam.    Both eyes were affected at the same time. If she closed her eyes, the dark   area would reappear when she opened them.    Last edited by David Collazo MD on 12/13/2017 10:30 AM. (History)            Assessment /Plan     For exam results, see Encounter Report.    Insufficiency of tear film of both eyes    Arcuate scotoma of both eyes      Ms. Arnold has visual changes that are inconsistent with an ocular origin. She may be having transient ischemic attacks although the description of her visual changes is atypical. She has a history of dry eye and this could produce a similar set of complaints. I recommended using an ophthalmic ointment or gel at bedtime. If this happens again, I recommended she see her PCP for evaluation.

## 2017-12-13 NOTE — TELEPHONE ENCOUNTER
"Mrs Arnold texted her weight in today.  I have called her to check in.  She is concerned about the appearance of her legs.  Although they are better, she states that they are the "thinnest they have ever been."  She has dropped another 2 lbs this week, however, she still reports some swelling in her feet that is uncomfortable for her, especially after being up and about all day.  She feels that her feet are improving-not as swollen but still with edema.  We will continue her torsemide dose at 20/10 for now.  However, we discussed reducing her dose for further weight loss and/or symptoms of dehydration which we discussed in full today.  She denies LH, weakenss, Dz, or syncope.  Weight is now b/w 114-116 lbs.  She is requesting a safe way to gain weight, I asked her to continue to eat lean meats often and healthy sides.  She is lactose intolerant and cannot supplement her diet with Ensure/Boost type drinks.  We also discussed the fluid intake restrictions on protien drinks that would affect her total daily liquid intake and to make sure she adds that to her daily intake if she were to find a non-milk based shake to drink.  Weight: 51.7 kg (114 lb) (12/13/2017  8:25 AM)        "

## 2017-12-13 NOTE — PATIENT INSTRUCTIONS
Try using an ophthalmic ointment or gel to lubricate eyes better at night.   If the symptoms recur, contact your PCP's ofice.

## 2017-12-14 ENCOUNTER — PATIENT MESSAGE (OUTPATIENT)
Dept: FAMILY MEDICINE | Facility: CLINIC | Age: 72
End: 2017-12-14

## 2017-12-14 DIAGNOSIS — L50.9 HIVES: ICD-10-CM

## 2017-12-14 RX ORDER — TRIAMCINOLONE ACETONIDE 1 MG/G
PASTE DENTAL
Qty: 5 G | Refills: 0 | Status: ON HOLD | OUTPATIENT
Start: 2017-12-14 | End: 2018-03-28 | Stop reason: HOSPADM

## 2017-12-15 RX ORDER — TRIAMCINOLONE ACETONIDE 1 MG/G
CREAM TOPICAL 2 TIMES DAILY
Qty: 80 G | Refills: 1 | Status: SHIPPED | OUTPATIENT
Start: 2017-12-15 | End: 2017-12-25

## 2017-12-17 ENCOUNTER — TELEPHONE (OUTPATIENT)
Dept: OTHER | Facility: OTHER | Age: 72
End: 2017-12-17

## 2017-12-17 NOTE — TELEPHONE ENCOUNTER
Mrs Arnold has texted in a weight and called my this morning to discuss her ankle swelling.  She feels that there have been slight improvements in her swelling near her ankle bones and up into her legs.  She feels when she wakes in the morning, the swelling that is normally there is less and after walking during the day, the overall accumulation is improved.  She feels that her breathing is stable.  She admits that she purposely does not exert herself, but walking in the home and grocery do not worsen her breathing.  She denies belly fullness or distention.  We discussed the slightly increased cr despite our reducing her discharge torsemide from 20 BID to 20/10(adjusted on 12/6), the dose on which she remains.  Because of her stable weights and improved swelling, we will continue this dose but will attempt to lower to 20 QD if she losing additional weight.  She is to report for her [facility] on Wednesday, concerned with slight rise in cr with this procedure.  She knows to call with any change in breathing or swelling.    Weight: 52 kg (114 lb 9.6 oz) (12/17/2017  8:16 AM)

## 2017-12-18 ENCOUNTER — OUTPATIENT CASE MANAGEMENT (OUTPATIENT)
Dept: ADMINISTRATIVE | Facility: OTHER | Age: 72
End: 2017-12-18

## 2017-12-18 NOTE — PROGRESS NOTES
12/18/17 chart reviewed in [facility] and no new ed or hospitalizations noted.      Progress towards goals:      LT goal 1  Patient/caregiver will accept life style changes to manage and improve symptoms of chf prior to discharge from [facility]. - Priority: high  Short Term Goals  1.  Patient/caregiver will measure and record weights daily and notify doctor if patient gains more than 3 pounds in one day or 5 pounds in one week within 1 month.  Partially met      Pt is weighing self daily and is informed of the symptoms to report to md..  Denies any changes in breathing or swelling to legs.  She is up 1 pound    Recognize and provide educational material (BRANDON).  Heart Failure: Warning signs of a Flare Up, [facility]s Discharge instructions for Patients with Heart Failure.      Reviewed handout with pt and she verbalized understanidng                                                                                                      2.  Patient/Caregiver will verbalize importance of early intervention for symptoms of CHF and verbalize 2 ways of preventing complications due to disease process within 1 month       Partially met   Recognize and provide educational material (BRANDON).  Low - Salt Choices, Managing your Heart failure. Taking medicine to Control Heart Failure.    Pt states her blood pressure range is 120/61 - 153/67.    Noted communication from digital chf program pharmacist rosario and pt is aware of her recommended dosage of torsemide of 20 mg in am 10 mg in pm.  Feels better now that her weight is somewhat stable  Pt is very open to education and suggestions for self improvement.    LT goal 2   Patient/caregiver will have knowledge of resources/benefits available from [facility] care policy in order to obtain the services/benefits that patient is eligible for or needs prior to discharge from [facility].    Short Term Goals  1.  Patient/caregiver will verbalize understanding of appropriate use of [employer]  care benefits within 2 months.   In progress         Patient states she received her meals from [employer] last week  Sodium content is only 426 per serving.  Pt states her new cardiac plus plan is going into effect on jan 1 2018 and this will be a big help for her copay reduction with all the specialist she has to see  Pt states she got her papers from [employer] and she has faxed them to pcp for signature and verification of chronic condition.    Plan   Continue to review the information mailed to pt  Follow up on receipt of the my symptom chart  Follow up on wt and blood pressure  Follow up on start of walking on treadmill       Clinical Reference Documents Added to Patient Instructions       Document    DIET, DISCHARGE INSTRUCTIONS FOR EATING A LOW-SALT  (ENGLISH)    HEART FAILURE, CONGESTIVE (CHF) (ENGLISH)    HEART FAILURE, DISCHARGE INSTRUCTIONS FOR (ENGLISH)    HEART FAILURE: BEING ACTIVE (ENGLISH)    HEART FAILURE: MAKING CHANGES TO YOUR DIET (ENGLISH)    HEART FAILURE: TRACKING YOUR WEIGHT (ENGLISH)    HEART FAILURE: WARNING SIGNS OF A FLARE-UP (ENGLISH)    LOW-SALT CHOICES (ENGLISH)    MY HEART FAILURE SYMPTOMS CHART (ENGLISH)    SALT, TIPS FOR USING LESS (ENGLISH)

## 2017-12-20 ENCOUNTER — HOSPITAL ENCOUNTER (OUTPATIENT)
Facility: HOSPITAL | Age: 72
Discharge: HOME | End: 2017-12-20
Attending: INTERNAL MEDICINE | Admitting: INTERNAL MEDICINE
Payer: MEDICARE

## 2017-12-20 VITALS
TEMPERATURE: 98 F | RESPIRATION RATE: 16 BRPM | SYSTOLIC BLOOD PRESSURE: 158 MMHG | OXYGEN SATURATION: 96 % | HEART RATE: 61 BPM | BODY MASS INDEX: 19.33 KG/M2 | HEIGHT: 65 IN | WEIGHT: 116 LBS | DIASTOLIC BLOOD PRESSURE: 74 MMHG

## 2017-12-20 DIAGNOSIS — I35.0 NONRHEUMATIC AORTIC VALVE STENOSIS: ICD-10-CM

## 2017-12-20 DIAGNOSIS — M32.9 SYSTEMIC LUPUS ERYTHEMATOSUS: ICD-10-CM

## 2017-12-20 DIAGNOSIS — I25.10 ATHEROSCLEROTIC HEART DISEASE OF NATIVE CORONARY ARTERY WITHOUT ANGINA PECTORIS: ICD-10-CM

## 2017-12-20 LAB
ABO + RH BLD: NORMAL
ANION GAP SERPL CALC-SCNC: 11 MMOL/L
BASOPHILS # BLD AUTO: 0.04 K/UL
BASOPHILS NFR BLD: 1.3 %
BLD GP AB SCN CELLS X3 SERPL QL: NORMAL
BUN SERPL-MCNC: 49 MG/DL
CALCIUM SERPL-MCNC: 8.9 MG/DL
CHLORIDE SERPL-SCNC: 104 MMOL/L
CO2 SERPL-SCNC: 23 MMOL/L
CORONARY STENOSIS: ABNORMAL
CREAT SERPL-MCNC: 2.4 MG/DL
DIFFERENTIAL METHOD: ABNORMAL
EOSINOPHIL # BLD AUTO: 0.1 K/UL
EOSINOPHIL NFR BLD: 3.6 %
ERYTHROCYTE [DISTWIDTH] IN BLOOD BY AUTOMATED COUNT: 13.9 %
EST. GFR  (AFRICAN AMERICAN): 22.7 ML/MIN/1.73 M^2
EST. GFR  (NON AFRICAN AMERICAN): 19.7 ML/MIN/1.73 M^2
GLUCOSE SERPL-MCNC: 74 MG/DL
HCT VFR BLD AUTO: 30.9 %
HGB BLD-MCNC: 9.7 G/DL
IMM GRANULOCYTES # BLD AUTO: 0 K/UL
IMM GRANULOCYTES NFR BLD AUTO: 0 %
LYMPHOCYTES # BLD AUTO: 1 K/UL
LYMPHOCYTES NFR BLD: 32.9 %
MCH RBC QN AUTO: 25.5 PG
MCHC RBC AUTO-ENTMCNC: 31.4 G/DL
MCV RBC AUTO: 81 FL
MONOCYTES # BLD AUTO: 0.5 K/UL
MONOCYTES NFR BLD: 17.1 %
NEUTROPHILS # BLD AUTO: 1.4 K/UL
NEUTROPHILS NFR BLD: 45.1 %
NRBC BLD-RTO: 0 /100 WBC
PLATELET # BLD AUTO: 178 K/UL
PLATELET RESPONSE PLAVIX: 163 PRU
PMV BLD AUTO: 10.4 FL
POTASSIUM SERPL-SCNC: 3.8 MMOL/L
RBC # BLD AUTO: 3.81 M/UL
SODIUM SERPL-SCNC: 138 MMOL/L
WBC # BLD AUTO: 3.04 K/UL

## 2017-12-20 PROCEDURE — 25000003 PHARM REV CODE 250: Performed by: INTERNAL MEDICINE

## 2017-12-20 PROCEDURE — 86900 BLOOD TYPING SEROLOGIC ABO: CPT

## 2017-12-20 PROCEDURE — C1894 INTRO/SHEATH, NON-LASER: HCPCS

## 2017-12-20 PROCEDURE — 93005 ELECTROCARDIOGRAM TRACING: CPT

## 2017-12-20 PROCEDURE — 63600175 PHARM REV CODE 636 W HCPCS: Performed by: INTERNAL MEDICINE

## 2017-12-20 PROCEDURE — 80048 BASIC METABOLIC PNL TOTAL CA: CPT

## 2017-12-20 PROCEDURE — 99152 MOD SED SAME PHYS/QHP 5/>YRS: CPT | Mod: ,,, | Performed by: INTERNAL MEDICINE

## 2017-12-20 PROCEDURE — 86901 BLOOD TYPING SEROLOGIC RH(D): CPT

## 2017-12-20 PROCEDURE — 99152 MOD SED SAME PHYS/QHP 5/>YRS: CPT

## 2017-12-20 PROCEDURE — 63600175 PHARM REV CODE 636 W HCPCS

## 2017-12-20 PROCEDURE — 93454 CORONARY ARTERY ANGIO S&I: CPT | Mod: 26,,, | Performed by: INTERNAL MEDICINE

## 2017-12-20 PROCEDURE — 85025 COMPLETE CBC W/AUTO DIFF WBC: CPT

## 2017-12-20 PROCEDURE — 85576 BLOOD PLATELET AGGREGATION: CPT

## 2017-12-20 PROCEDURE — 93010 ELECTROCARDIOGRAM REPORT: CPT | Mod: ,,, | Performed by: INTERNAL MEDICINE

## 2017-12-20 PROCEDURE — 25000003 PHARM REV CODE 250

## 2017-12-20 RX ORDER — FUROSEMIDE 10 MG/ML
10 INJECTION INTRAMUSCULAR; INTRAVENOUS ONCE
Status: COMPLETED | OUTPATIENT
Start: 2017-12-20 | End: 2017-12-20

## 2017-12-20 RX ORDER — DIPHENHYDRAMINE HCL 50 MG
50 CAPSULE ORAL ONCE
Status: COMPLETED | OUTPATIENT
Start: 2017-12-20 | End: 2017-12-20

## 2017-12-20 RX ORDER — LIDOCAINE HCL/EPINEPHRINE/PF 2%-1:200K
1 VIAL (ML) INJECTION ONCE
Status: COMPLETED | OUTPATIENT
Start: 2017-12-20 | End: 2017-12-20

## 2017-12-20 RX ORDER — SODIUM CHLORIDE 9 MG/ML
INJECTION, SOLUTION INTRAVENOUS CONTINUOUS
Status: ACTIVE | OUTPATIENT
Start: 2017-12-20 | End: 2017-12-20

## 2017-12-20 RX ORDER — HYDRALAZINE HYDROCHLORIDE 25 MG/1
25 TABLET, FILM COATED ORAL ONCE
Status: COMPLETED | OUTPATIENT
Start: 2017-12-20 | End: 2017-12-20

## 2017-12-20 RX ADMIN — SODIUM CHLORIDE: 0.9 INJECTION, SOLUTION INTRAVENOUS at 07:12

## 2017-12-20 RX ADMIN — DIPHENHYDRAMINE HYDROCHLORIDE 50 MG: 50 CAPSULE ORAL at 06:12

## 2017-12-20 RX ADMIN — HYDRALAZINE HYDROCHLORIDE 25 MG: 25 TABLET, FILM COATED ORAL at 10:12

## 2017-12-20 RX ADMIN — LIDOCAINE HYDROCHLORIDE,EPINEPHRINE BITARTRATE 1 ML: 20; .005 INJECTION, SOLUTION EPIDURAL; INFILTRATION; INTRACAUDAL; PERINEURAL at 11:12

## 2017-12-20 RX ADMIN — FUROSEMIDE 10 MG: 10 INJECTION, SOLUTION INTRAVENOUS at 09:12

## 2017-12-20 NOTE — PROGRESS NOTES
Patient ambulated around unit with standby assist. R groin dressing remained CDI, soft, non tender. Will monitor.

## 2017-12-20 NOTE — SUBJECTIVE & OBJECTIVE
Past Medical History:   Diagnosis Date    Acute on chronic diastolic congestive heart failure 11/17/2017    Allergy     Anatomical narrow angle glaucoma     Anemia     States diagnosed about a month ago    Aortic atherosclerosis     Arthritis     Cataract     CHF (congestive heart failure)     Chronic kidney disease     Chronic sciatica of left side     Right lower back pain due to sciatica    Coronary artery disease     Depression     Dry eyes     GERD (gastroesophageal reflux disease)     History of colon cancer     Hyperlipidemia     Hypertension     Hypothyroidism     Left ventricular diastolic dysfunction with preserved systolic function     Lupus (systemic lupus erythematosus) 8/17/2012    Malnutrition 5/16/2017    Osteoarthritis of cervical spine 8/17/2012    Osteopenia     PAD (peripheral artery disease)        Past Surgical History:   Procedure Laterality Date    CARDIAC CATHETERIZATION  07/27/2011    x1    CHOLECYSTECTOMY  1999    COLON SURGERY  2000 & 2003    partial removal    COLONOSCOPY N/A 4/14/2016    Procedure: COLONOSCOPY;  Surgeon: Jose Steen MD;  Location: [facility] ([address] [place] [address]);  Service: Endoscopy;  Laterality: N/A;  prep 2 days prior light meals only/clear liquid day before  and 4 dulcolax tabs (5 mgs) at noon    COLONOSCOPY N/A 9/14/2017    Procedure: COLONOSCOPY;  Surgeon: Jose Steen MD;  Location: [facility] ([address] [place] [address]);  Service: Endoscopy;  Laterality: N/A;    EYE SURGERY      HAND SURGERY Bilateral 1996 & 1997    due to carpal tunnel     HERNIA REPAIR      HYSTERECTOMY  1983    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    Arthrotec 50 [diclofenac-misoprostol]      Other reaction(s): Unknown    Bentyl [dicyclomine]      Not effective    Doxycycline      nausea    Imdur [isosorbide mononitrate] Nausea Only    Lomotil [diphenoxylate-atropine]      Stomach cramps, pt vomitted    Lozol [indapamide] Rash    Tramadol Nausea Only       PTA Medications   Medication Sig    acetaminophen (TYLENOL) 500 MG tablet Take 500 mg by mouth every 6 (six) hours as needed for Pain.    aspirin (ECOTRIN) 81 MG EC tablet Take 1 tablet (81 mg total) by mouth once daily. (Patient taking differently: Take 81 mg by mouth. 1 tablet every other day)    carvedilol (COREG) 12.5 MG tablet Take 1 tablet (12.5 mg total) by mouth 2 (two) times daily with meals.    citalopram (CELEXA) 20 MG tablet TAKE 1 TABLET (20 MG TOTAL) BY MOUTH ONCE DAILY.    clopidogrel (PLAVIX) 75 mg tablet Take 1 tablet (75 mg total) by mouth once daily.    ENSURE ACTIVE CLEAR Liqd Take 240 mLs by mouth 2 (two) times daily.    hydrALAZINE (APRESOLINE) 50 MG tablet Take 1 tablet (50 mg total) by mouth every 12 (twelve) hours.    hydroxychloroquine (PLAQUENIL) 200 mg tablet TAKE 1 TABLET TWICE DAILY    irbesartan (AVAPRO) 300 MG tablet Take 1 tablet (300 mg total) by mouth every evening.    latanoprost 0.005 % ophthalmic solution INSTILL 1 DROP INTO BOTH EYES EVERY DAY    levothyroxine (SYNTHROID) 75 MCG tablet Take 1 tablet (75 mcg total) by mouth before breakfast.    rosuvastatin (CRESTOR) 40 MG Tab TAKE 1 TABLET EVERY EVENING    torsemide (DEMADEX) 20 MG Tab Take 1 tablet (20 mg total) by mouth 2 (two) times daily. (Patient taking differently: Take 20 mg by mouth. )    VITAMIN D2 50,000 unit capsule TAKE 1 CAPSULE (50,000 UNITS TOTAL) EVERY 30 DAYS    ammonium lactate 12 % Crea Apply 1 application  topically 2 (two) times daily as needed.    betamethasone dipropionate (DIPROLENE) 0.05 % cream Apply topically 2 (two) times daily as needed.    butalbital-aspirin-caffeine -40 mg (FIORINAL) -40 mg Cap Take 1 capsule by mouth every 6 (six) hours as needed.     cholestyramine (QUESTRAN) 4 gram packet Take 1 packet (4 g total) by mouth 3 (three) times daily with meals.    conjugated estrogens (PREMARIN) vaginal cream Use 1g nightly for two weeks, then 1g twice per week.    fexofenadine (ALLEGRA) 180 MG tablet Take 1 tablet (180 mg total) by mouth once daily. (Patient taking differently: Take 180 mg by mouth as needed. )    hyoscyamine (LEVSIN/SL) 0.125 mg Subl Place 1 tablet (0.125 mg total) under the tongue 2 (two) times daily as needed.    ipratropium (ATROVENT) 0.03 % nasal spray 2 sprays by Nasal route 2 (two) times daily as needed for Rhinitis.    meclizine (ANTIVERT) 12.5 mg tablet Take 1 tablet (12.5 mg total) by mouth 3 (three) times daily as needed.    multivitamin capsule Take 1 capsule by mouth once daily.    nitroGLYCERIN (NITROSTAT) 0.4 MG SL tablet Place 1 tablet (0.4 mg total) under the tongue every 5 (five) minutes as needed for Chest pain.    nystatin (MYCOSTATIN) ointment Apply topically 2 (two) times daily.    nystatin-triamcinolone (MYCOLOG II) cream Apply topically 4 (four) times daily.    omeprazole (PRILOSEC) 40 MG capsule Take 1 capsule (40 mg total) by mouth once daily.    TENS units Sravanthi 1 application by Misc.(Non-Drug; Combo Route) route daily as needed (pain).    triamcinolone acetonide 0.1% (KENALOG) 0.1 % cream Apply topically 2 (two) times daily.    triamcinolone acetonide 0.1% (KENALOG) 0.1 % paste PLACE ONTO TEETH TWICE DAILY     Family History     Problem Relation (Age of Onset)    Diabetes Maternal Grandmother, Son, Maternal Aunt    Heart attack Mother    Heart disease Father    Hypertension Mother, Father, Sister, Brother    Kidney disease Brother     Kidney failure Brother    No Known Problems Daughter, Maternal Grandfather, Paternal Grandmother, Paternal Grandfather        Social History Main Topics    Smoking status: Former Smoker     Packs/day: 1.50     Years: 30.00     Types: Cigarettes     Start date: 1955     Quit date: 3/13/1985    Smokeless tobacco: Never Used    Alcohol use No    Drug use: No    Sexual activity: Not Currently     Partners: Male     Birth control/ protection: Abstinence     Review of Systems   Constitution: Negative for chills, decreased appetite, fever, weakness, night sweats, weight gain and weight loss.   HENT: Negative for congestion, hoarse voice, stridor and tinnitus.    Eyes: Negative for blurred vision, pain and visual disturbance.   Cardiovascular: Positive for dyspnea on exertion. Negative for chest pain, claudication, irregular heartbeat, leg swelling, near-syncope, orthopnea, palpitations, paroxysmal nocturnal dyspnea and syncope.   Respiratory: Negative for cough, hemoptysis, shortness of breath, snoring and wheezing.    Endocrine: Negative for cold intolerance, heat intolerance and polyuria.   Hematologic/Lymphatic: Negative for bleeding problem. Does not bruise/bleed easily.   Skin: Negative for color change and rash.   Musculoskeletal: Positive for arthritis. Negative for back pain, joint pain, muscle cramps, myalgias and stiffness.   Gastrointestinal: Negative for abdominal pain, anorexia, constipation, diarrhea, dysphagia, heartburn, hemorrhoids, melena, nausea and vomiting.   Genitourinary: Negative for frequency, hematuria, hesitancy, nocturia and urgency.   Neurological: Negative for difficulty with concentration, dizziness, focal weakness, headaches, light-headedness, numbness, seizures, tremors and vertigo.   Psychiatric/Behavioral: Negative for altered mental status and depression. The patient does not have insomnia.    Allergic/Immunologic: Negative for hives.     Objective:     Vital Signs (Most  Recent):  Temp: 97.7 °F (36.5 °C) (12/20/17 0630)  Pulse: 61 (12/20/17 0630)  Resp: 16 (12/20/17 0630)  BP: (!) 172/74 (12/20/17 0631)  SpO2: 97 % (12/20/17 0630) Vital Signs (24h Range):  Temp:  [97.7 °F (36.5 °C)] 97.7 °F (36.5 °C)  Pulse:  [61] 61  Resp:  [16] 16  SpO2:  [97 %] 97 %  BP: (165-172)/(73-74) 172/74     Weight: 52.6 kg (116 lb)  Body mass index is 19.3 kg/m².    SpO2: 97 %  O2 Device (Oxygen Therapy): room air    No intake or output data in the 24 hours ending 12/20/17 0721    Lines/Drains/Airways     Peripheral Intravenous Line                 Peripheral IV - Single Lumen 12/20/17 0644 Left Hand less than 1 day         Peripheral IV - Single Lumen 12/20/17 0650 Left Wrist less than 1 day                Physical Exam   Constitutional: She appears well-developed and well-nourished.   HENT:   Head: Normocephalic and atraumatic.   Right Ear: External ear normal.   Left Ear: External ear normal.   Eyes: Conjunctivae and EOM are normal. Pupils are equal, round, and reactive to light.   Neck: Normal range of motion. Neck supple. No JVD present. No thyromegaly present.   Cardiovascular: Normal rate, regular rhythm, S1 normal, S2 normal and intact distal pulses.  Exam reveals no gallop, no S3 and no friction rub.    Murmur heard.  Pulses:       Radial pulses are 2+ on the right side, and 2+ on the left side.        Femoral pulses are 2+ on the right side, and 2+ on the left side.       Dorsalis pedis pulses are 2+ on the right side, and 2+ on the left side.        Posterior tibial pulses are 2+ on the right side, and 2+ on the left side.   Early to mid peaking murmur   Pulmonary/Chest: Effort normal. No stridor. She has no wheezes. She has no rales. She exhibits no tenderness.   Abdominal: Soft. Bowel sounds are normal. She exhibits no distension. There is no tenderness. There is no rebound and no guarding.   Musculoskeletal: Normal range of motion. She exhibits no edema or tenderness.   Psychiatric: She has  a normal mood and affect.       Significant Labs: All pertinent lab results from the last 24 hours have been reviewed.

## 2017-12-20 NOTE — DISCHARGE SUMMARY
Discharge Summary  Interventional Cardiology      Admit Date: 12/20/2017    Discharge Date:  12/20/2017    Attending Physician: Chandan Ly MD    Discharge Physician: Cecil Harris MD    Principal Diagnoses: CAD (coronary artery disease)  Indication for Admission: Left heart cath (Left)    Discharged Condition: Good    Hospital Course:   Patient presented for outpatient LHC which went without complication. LHC was nonobstructive but with elevated filling pressures. IV lasix  Given post cath.     Outpatient Plan:  - Continue with medical management of ASCVD and diastolic dysfunction. She needs better BP control to control diastolic dysfunction related PEDRO. Her SBP was in 180s to 160s range despite her home meds for am.  - Continue with ASA 81 mg po daily. No indication of DaPT currently so stopped plavix.    Diet: Cardiac diet    Activity: Ad shayla    Disposition: Home or Self Care    Discharge Medications:      Medication List      Continue to take these these medications    aspirin 81 MG EC tablet  Commonly known as:  ECOTRIN  Take 1 tablet (81 mg total) by mouth once daily.       fexofenadine 180 MG tablet  Commonly known as:  ALLEGRA  Take 1 tablet (180 mg total) by mouth once daily.  What changed:  ·      torsemide 20 MG Tab  Commonly known as:  DEMADEX  Take 1 tablet (20 mg total) by mouth 2 (two) times daily.        CONTINUE taking these medications    acetaminophen 500 MG tablet  Commonly known as:  TYLENOL     ammonium lactate 12 % Crea  Apply 1 application topically 2 (two) times daily as needed.     betamethasone dipropionate 0.05 % cream  Commonly known as:  DIPROLENE  Apply topically 2 (two) times daily as needed.     butalbital-aspirin-caffeine -40 mg -40 mg Cap  Commonly known as:  FIORINAL     carvedilol 12.5 MG tablet  Commonly known as:  COREG  Take 1 tablet (12.5 mg total) by mouth 2 (two) times daily with meals.     cholestyramine 4 gram packet  Commonly known as:  QUESTRAN  Take 1  packet (4 g total) by mouth 3 (three) times daily with meals.     citalopram 20 MG tablet  Commonly known as:  CELEXA  TAKE 1 TABLET (20 MG TOTAL) BY MOUTH ONCE DAILY.     conjugated estrogens vaginal cream  Commonly known as:  PREMARIN  Use 1g nightly for two weeks, then 1g twice per week.     ENSURE ACTIVE CLEAR Liqd  Generic drug:  food supplemt, lactose-reduced  Take 240 mLs by mouth 2 (two) times daily.     hydrALAZINE 50 MG tablet  Commonly known as:  APRESOLINE  Take 1 tablet (50 mg total) by mouth every 12 (twelve) hours.     hydroxychloroquine 200 mg tablet  Commonly known as:  PLAQUENIL  TAKE 1 TABLET TWICE DAILY     hyoscyamine 0.125 mg Subl  Commonly known as:  LEVSIN/SL  Place 1 tablet (0.125 mg total) under the tongue 2 (two) times daily as needed.     ipratropium 0.03 % nasal spray  Commonly known as:  ATROVENT  2 sprays by Nasal route 2 (two) times daily as needed for Rhinitis.     irbesartan 300 MG tablet  Commonly known as:  AVAPRO  Take 1 tablet (300 mg total) by mouth every evening.     latanoprost 0.005 % ophthalmic solution  INSTILL 1 DROP INTO BOTH EYES EVERY DAY     levothyroxine 75 MCG tablet  Commonly known as:  SYNTHROID  Take 1 tablet (75 mcg total) by mouth before breakfast.     meclizine 12.5 mg tablet  Commonly known as:  ANTIVERT  Take 1 tablet (12.5 mg total) by mouth 3 (three) times daily as needed.     multivitamin capsule     nitroGLYCERIN 0.4 MG SL tablet  Commonly known as:  NITROSTAT  Place 1 tablet (0.4 mg total) under the tongue every 5 (five) minutes as needed for Chest pain.     nystatin ointment  Commonly known as:  MYCOSTATIN  Apply topically 2 (two) times daily.     nystatin-triamcinolone cream  Commonly known as:  MYCOLOG II  Apply topically 4 (four) times daily.     omeprazole 40 MG capsule  Commonly known as:  PRILOSEC  Take 1 capsule (40 mg total) by mouth once daily.     rosuvastatin 40 MG Tab  Commonly known as:  CRESTOR  TAKE 1 TABLET EVERY EVENING     TENS units  Sravanthi  1 application by Misc.(Non-Drug; Combo Route) route daily as needed (pain).     * triamcinolone acetonide 0.1% 0.1 % paste  Commonly known as:  KENALOG  PLACE ONTO TEETH TWICE DAILY     * triamcinolone acetonide 0.1% 0.1 % cream  Commonly known as:  KENALOG  Apply topically 2 (two) times daily.     VITAMIN D2 50,000 unit Cap  Generic drug:  ergocalciferol  TAKE 1 CAPSULE (50,000 UNITS TOTAL) EVERY 30 DAYS        * This list has             STOP taking these medications    clopidogrel 75 mg tablet  Commonly known as:  PLAVIX          Follow Up:

## 2017-12-20 NOTE — H&P
[facility]  Interventional Cardiology  H&P    Patient Name: Ewelina Arnold  MRN: [id number]  Admission Date: 12/20/2017  Code Status: Prior   Attending Provider: Chandan Ly MD   Primary Care Physician: Kalie Walton MD  Principal Problem:CAD (coronary artery disease)    Patient information was obtained from patient and ER records.     Subjective:     Chief Complaint:  SOB     HPI:  71 y F with PMHx of HTN, CAD s/p PCI to mLAD 2011 (2.25 x 28 KIT), HLD, history partial colectomy for polyps and colon CA (cancer free now), single kidney s/p kidney donation 1985, SLE, HFpEF (60) with NYHA FC II symptoms,Pulmonary HTN (sPAP 58). moderate aortic stenosis presenting with 2-3 mths hx of progressively worsening dyspnea on exertion and bilateral lower extremity edema here for coronary angiogram +/- PCI. She does not meet criteria for TAVR.     Reports a 6 wks hx of  PEDRO with short distances, leg weakness, decreased appetite.     She was recently evaluated by Dr. Rob on 11/4/17 and was deemed moderate risk for surgery considering her comorbidities and was referred to Dr. Ly for evaluation of BAV as part of Heart Team.    Primary Cardiologists: Dr. Erika Zuniga/Dr. Sellers.    The patient has undergone the following TAVR work-up:   · ECHO (Date 10/30/17): PABLITO= 0.96cm2, MG= 28mmHg, Peak Abdulkadir= 3.7m/s, EF= 60%.   · LHC : pending  · STS: pending  · Frailty: pending  · Iliacs arepending  · LVOT area by CTA is pending  · Incidental findings on CT:pending  · CT Surgery risk assessment: moderate risk, per Dr Rob due to comorbidities  · Rhythm issues: none  · PFTs: FEV1 pending  Comorbidities: CKD/single kidney/PH    Past Medical History:   Diagnosis Date    Acute on chronic diastolic congestive heart failure 11/17/2017    Allergy     Anatomical narrow angle glaucoma     Anemia     States diagnosed about a month ago    Aortic atherosclerosis     Arthritis     Cataract     CHF  (congestive heart failure)     Chronic kidney disease     Chronic sciatica of left side     Right lower back pain due to sciatica    Coronary artery disease     Depression     Dry eyes     GERD (gastroesophageal reflux disease)     History of colon cancer     Hyperlipidemia     Hypertension     Hypothyroidism     Left ventricular diastolic dysfunction with preserved systolic function     Lupus (systemic lupus erythematosus) 8/17/2012    Malnutrition 5/16/2017    Osteoarthritis of cervical spine 8/17/2012    Osteopenia     PAD (peripheral artery disease)        Past Surgical History:   Procedure Laterality Date    CARDIAC CATHETERIZATION  07/27/2011    x1    CHOLECYSTECTOMY  1999    COLON SURGERY  2000 & 2003    partial removal    COLONOSCOPY N/A 4/14/2016    Procedure: COLONOSCOPY;  Surgeon: Jose Steen MD;  Location: [facility]  Service: Endoscopy;  Laterality: N/A;  prep 2 days prior light meals only/clear liquid day before  and 4 dulcolax tabs (5 mgs) at noon    COLONOSCOPY N/A 9/14/2017    Procedure: COLONOSCOPY;  Surgeon: Jose Steen MD;  Location: [facility] ([place]);  Service: Endoscopy;  Laterality: N/A;    EYE SURGERY      HAND SURGERY Bilateral 1996 & 1997    due to carpal tunnel     HERNIA REPAIR      HYSTERECTOMY  1983    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    Arthrotec 50 [diclofenac-misoprostol]      Other reaction(s): Unknown    Bentyl [dicyclomine]       Not effective    Doxycycline      nausea    Imdur [isosorbide mononitrate] Nausea Only    Lomotil [diphenoxylate-atropine]      Stomach cramps, pt vomitted    Lozol [indapamide] Rash    Tramadol Nausea Only       PTA Medications   Medication Sig    acetaminophen (TYLENOL) 500 MG tablet Take 500 mg by mouth every 6 (six) hours as needed for Pain.    aspirin (ECOTRIN) 81 MG EC tablet Take 1 tablet (81 mg total) by mouth once daily. (Patient taking differently: Take 81 mg by mouth. 1 tablet every other day)    carvedilol (COREG) 12.5 MG tablet Take 1 tablet (12.5 mg total) by mouth 2 (two) times daily with meals.    citalopram (CELEXA) 20 MG tablet TAKE 1 TABLET (20 MG TOTAL) BY MOUTH ONCE DAILY.    clopidogrel (PLAVIX) 75 mg tablet Take 1 tablet (75 mg total) by mouth once daily.    ENSURE ACTIVE CLEAR Liqd Take 240 mLs by mouth 2 (two) times daily.    hydrALAZINE (APRESOLINE) 50 MG tablet Take 1 tablet (50 mg total) by mouth every 12 (twelve) hours.    hydroxychloroquine (PLAQUENIL) 200 mg tablet TAKE 1 TABLET TWICE DAILY    irbesartan (AVAPRO) 300 MG tablet Take 1 tablet (300 mg total) by mouth every evening.    latanoprost 0.005 % ophthalmic solution INSTILL 1 DROP INTO BOTH EYES EVERY DAY    levothyroxine (SYNTHROID) 75 MCG tablet Take 1 tablet (75 mcg total) by mouth before breakfast.    rosuvastatin (CRESTOR) 40 MG Tab TAKE 1 TABLET EVERY EVENING    torsemide (DEMADEX) 20 MG Tab Take 1 tablet (20 mg total) by mouth 2 (two) times daily. (Patient taking differently: Take 20 mg by mouth. )    VITAMIN D2 50,000 unit capsule TAKE 1 CAPSULE (50,000 UNITS TOTAL) EVERY 30 DAYS    ammonium lactate 12 % Crea Apply 1 application topically 2 (two) times daily as needed.    betamethasone dipropionate (DIPROLENE) 0.05 % cream Apply topically 2 (two) times daily as needed.    butalbital-aspirin-caffeine -40 mg (FIORINAL) -40 mg Cap Take 1 capsule by mouth every 6 (six) hours as needed.      cholestyramine (QUESTRAN) 4 gram packet Take 1 packet (4 g total) by mouth 3 (three) times daily with meals.    conjugated estrogens (PREMARIN) vaginal cream Use 1g nightly for two weeks, then 1g twice per week.    fexofenadine (ALLEGRA) 180 MG tablet Take 1 tablet (180 mg total) by mouth once daily. (Patient taking differently: Take 180 mg by mouth as needed. )    hyoscyamine (LEVSIN/SL) 0.125 mg Subl Place 1 tablet (0.125 mg total) under the tongue 2 (two) times daily as needed.    ipratropium (ATROVENT) 0.03 % nasal spray 2 sprays by Nasal route 2 (two) times daily as needed for Rhinitis.    meclizine (ANTIVERT) 12.5 mg tablet Take 1 tablet (12.5 mg total) by mouth 3 (three) times daily as needed.    multivitamin capsule Take 1 capsule by mouth once daily.    nitroGLYCERIN (NITROSTAT) 0.4 MG SL tablet Place 1 tablet (0.4 mg total) under the tongue every 5 (five) minutes as needed for Chest pain.    nystatin (MYCOSTATIN) ointment Apply topically 2 (two) times daily.    nystatin-triamcinolone (MYCOLOG II) cream Apply topically 4 (four) times daily.    omeprazole (PRILOSEC) 40 MG capsule Take 1 capsule (40 mg total) by mouth once daily.    TENS units Sravanthi 1 application by Misc.(Non-Drug; Combo Route) route daily as needed (pain).    triamcinolone acetonide 0.1% (KENALOG) 0.1 % cream Apply topically 2 (two) times daily.    triamcinolone acetonide 0.1% (KENALOG) 0.1 % paste PLACE ONTO TEETH TWICE DAILY     Family History     Problem Relation (Age of Onset)    Diabetes Maternal Grandmother, Son, Maternal Aunt    Heart attack Mother    Heart disease Father    Hypertension Mother, Father, Sister, Brother    Kidney disease Brother    Kidney failure Brother    No Known Problems Daughter, Maternal Grandfather, Paternal Grandmother, Paternal Grandfather        Social History Main Topics    Smoking status: Former Smoker     Packs/day: 1.50     Years: 30.00     Types: Cigarettes     Start date: 1955     Quit  date: 3/13/1985    Smokeless tobacco: Never Used    Alcohol use No    Drug use: No    Sexual activity: Not Currently     Partners: Male     Birth control/ protection: Abstinence     Review of Systems   Constitution: Negative for chills, decreased appetite, fever, weakness, night sweats, weight gain and weight loss.   HENT: Negative for congestion, hoarse voice, stridor and tinnitus.    Eyes: Negative for blurred vision, pain and visual disturbance.   Cardiovascular: Positive for dyspnea on exertion. Negative for chest pain, claudication, irregular heartbeat, leg swelling, near-syncope, orthopnea, palpitations, paroxysmal nocturnal dyspnea and syncope.   Respiratory: Negative for cough, hemoptysis, shortness of breath, snoring and wheezing.    Endocrine: Negative for cold intolerance, heat intolerance and polyuria.   Hematologic/Lymphatic: Negative for bleeding problem. Does not bruise/bleed easily.   Skin: Negative for color change and rash.   Musculoskeletal: Positive for arthritis. Negative for back pain, joint pain, muscle cramps, myalgias and stiffness.   Gastrointestinal: Negative for abdominal pain, anorexia, constipation, diarrhea, dysphagia, heartburn, hemorrhoids, melena, nausea and vomiting.   Genitourinary: Negative for frequency, hematuria, hesitancy, nocturia and urgency.   Neurological: Negative for difficulty with concentration, dizziness, focal weakness, headaches, light-headedness, numbness, seizures, tremors and vertigo.   Psychiatric/Behavioral: Negative for altered mental status and depression. The patient does not have insomnia.    Allergic/Immunologic: Negative for hives.     Objective:     Vital Signs (Most Recent):  Temp: 97.7 °F (36.5 °C) (12/20/17 0630)  Pulse: 61 (12/20/17 0630)  Resp: 16 (12/20/17 0630)  BP: (!) 172/74 (12/20/17 0631)  SpO2: 97 % (12/20/17 0630) Vital Signs (24h Range):  Temp:  [97.7 °F (36.5 °C)] 97.7 °F (36.5 °C)  Pulse:  [61] 61  Resp:  [16] 16  SpO2:  [97 %] 97  %  BP: (165-172)/(73-74) 172/74     Weight: 52.6 kg (116 lb)  Body mass index is 19.3 kg/m².    SpO2: 97 %  O2 Device (Oxygen Therapy): room air    No intake or output data in the 24 hours ending 12/20/17 0721    Lines/Drains/Airways     Peripheral Intravenous Line                 Peripheral IV - Single Lumen 12/20/17 0644 Left Hand less than 1 day         Peripheral IV - Single Lumen 12/20/17 0650 Left Wrist less than 1 day                Physical Exam   Constitutional: She appears well-developed and well-nourished.   HENT:   Head: Normocephalic and atraumatic.   Right Ear: External ear normal.   Left Ear: External ear normal.   Eyes: Conjunctivae and EOM are normal. Pupils are equal, round, and reactive to light.   Neck: Normal range of motion. Neck supple. No JVD present. No thyromegaly present.   Cardiovascular: Normal rate, regular rhythm, S1 normal, S2 normal and intact distal pulses.  Exam reveals no gallop, no S3 and no friction rub.    Murmur heard.  Pulses:       Radial pulses are 2+ on the right side, and 2+ on the left side.        Femoral pulses are 2+ on the right side, and 2+ on the left side.       Dorsalis pedis pulses are 2+ on the right side, and 2+ on the left side.        Posterior tibial pulses are 2+ on the right side, and 2+ on the left side.   Early to mid peaking murmur   Pulmonary/Chest: Effort normal. No stridor. She has no wheezes. She has no rales. She exhibits no tenderness.   Abdominal: Soft. Bowel sounds are normal. She exhibits no distension. There is no tenderness. There is no rebound and no guarding.   Musculoskeletal: Normal range of motion. She exhibits no edema or tenderness.   Psychiatric: She has a normal mood and affect.       Significant Labs: All pertinent lab results from the last 24 hours have been reviewed.        Assessment and Plan:     * CAD (coronary artery disease)    - Plan for LHC (KIT candidate)  - R CFA 6 Fr   - DAPT  - Keep NPO   - Pre-cath IVF ordered  -The  risks, benefits and alternatives of the procedure were explained to the patient.   -The risks of coronary angiography include but are not limited to: bleeding, infection, heart rhythm abnormalities, allergic reactions, kidney injury, stroke and death.   -The risks of moderate sedation include hypotension, respiratory depression, arrhythmias, bronchospasm, and death.   - Informed consent was obtained and the patient is agreeable to proceed with the procedure.        Nonrheumatic aortic valve stenosis    Continue medical management        Hyperlipidemia    Continue crestor         Hypertension    Continue BB/hydralazine            VTE Risk Mitigation     None          Cecil Harris MD  Interventional Cardiology   [facility]

## 2017-12-20 NOTE — ASSESSMENT & PLAN NOTE
- Plan for LHC (KIT candidate)  - R CFA 5 Fr   - DAPT  - Keep NPO   - Pre-cath IVF ordered  -The risks, benefits and alternatives of the procedure were explained to the patient.   -The risks of coronary angiography include but are not limited to: bleeding, infection, heart rhythm abnormalities, allergic reactions, kidney injury, stroke and death.   -The risks of moderate sedation include hypotension, respiratory depression, arrhythmias, bronchospasm, and death.   - Informed consent was obtained and the patient is agreeable to proceed with the procedure.

## 2017-12-20 NOTE — PLAN OF CARE
Problem: Patient Care Overview  Goal: Plan of Care Review  Outcome: Ongoing (interventions implemented as appropriate)  Received report from Donn. Patient s/p [facility], AAOx3. VSS, no c/o pain or discomfort at this time, resp even and unlabored. Gauze/tegaderm dressing to R groin with small amount of blood noted. No hematoma noted. Area marked. Post procedure protocol reviewed with patient and patient's family. Understanding verbalized. Family members at bedside. Nurse call bell within reach. Will continue to monitor per post procedure protocol.

## 2017-12-20 NOTE — PROGRESS NOTES
Pressure held x 15 minutes to R groin for persistent tract bleed. New dressing applied. Will monitor.

## 2017-12-20 NOTE — HPI
71 y F with PMHx of HTN, CAD s/p PCI to mLAD 2011 (2.25 x 28 KIT), HLD, history partial colectomy for polyps and colon CA (cancer free now), single kidney s/p kidney donation 1985, SLE, HFpEF (60) with NYHA FC II symptoms,Pulmonary HTN (sPAP 58). moderate aortic stenosis presenting with 2-3 mths hx of progressively worsening dyspnea on exertion and bilateral lower extremity edema here for coronary angiogram +/- PCI. She does not meet criteria for TAVR.   Reports a 4 wks hx of orthopnea having to sleep propped up on 2 pillows, 2wk hx of wheezing, PEDRO with short distances, leg weakness, decreased appetite and bilat leg edema 3+.      She was recently evaluated by Dr. Rob on 11/4/17 and was deemed moderate risk for surgery considering her comorbidities and was referred to Dr. Ly for evaluation of BAV as part of Heart Team.    Primary Cardiologists: Dr. Erika Zuniga/Dr. Sellers.    The patient has undergone the following TAVR work-up:   · ECHO (Date 10/30/17): PABLITO= 0.96cm2, MG= 28mmHg, Peak Abdulkadir= 3.7m/s, EF= 60%.   · LHC : pending  · STS: pending  · Frailty: pending  · Iliacs arepending  · LVOT area by CTA is pending  · Incidental findings on CT:pending  · CT Surgery risk assessment: moderate risk, per Dr Rob due to comorbidities  · Rhythm issues: none  · PFTs: FEV1 pending  · Comorbidities: CKD/single kidney/PH

## 2017-12-20 NOTE — PROGRESS NOTES
Pt DC'd per MD order. Discharge instructions given including activity,  wound care, S&S of infections, future appointments, and when to call MD. Medications reviewed including when to take next dose. Telemetry and PIV DC'd, catheter tip intact. Pt left unit via wheelchair with transport and family.

## 2017-12-20 NOTE — PROGRESS NOTES
Tract bleed noted to R groin. Site pinched and pressure held x 10 minutes. Dr Harris aware. New dressing applied. Will monitor.

## 2017-12-21 ENCOUNTER — TELEPHONE (OUTPATIENT)
Dept: OTHER | Facility: OTHER | Age: 72
End: 2017-12-21

## 2017-12-21 DIAGNOSIS — I50.32 CHRONIC DIASTOLIC CONGESTIVE HEART FAILURE: Primary | ICD-10-CM

## 2017-12-23 ENCOUNTER — TELEPHONE (OUTPATIENT)
Dept: OTHER | Facility: OTHER | Age: 72
End: 2017-12-23

## 2017-12-23 NOTE — TELEPHONE ENCOUNTER
Mrs. Chen's weight continues to remain stable on the lower dose of torsemide 20mg daily.  She is monitoring her diet closely and is feeling well.  She denies LLE (resolved since [facility]), SOB, PND or abdominal bloating. She is beginning to express concern about regaining some healthy weight as she feels she lost body fat as well.  She used to exercise 3x/week prior to hospitalization and is interested in restarting this as tolerated.  She also used 2-5lb hand weights for weight bearing exercises and is also planning to resume doing some of this as well. She will continue to track her daily weights and knows to call with any changes or concerns.   Weight: 52 kg (114 lb 9.6 oz) (12/23/2017  8:15 AM)

## 2017-12-25 ENCOUNTER — TELEPHONE (OUTPATIENT)
Dept: OTHER | Facility: OTHER | Age: 72
End: 2017-12-25

## 2017-12-25 NOTE — TELEPHONE ENCOUNTER
Mrs Arnold text a stable weight today.  She reports no chagnes in breathing or swelling.  She will report to lab to check on her renal function.  She also has a nephrology appointment.  Will follow-up after notes and labs after returned.  She remains on torsemdie 20 mg QD.  Weight: 52.3 kg (115 lb 6.4 oz) (12/25/2017 10:16 AM)

## 2017-12-26 ENCOUNTER — TELEPHONE (OUTPATIENT)
Dept: OTHER | Facility: OTHER | Age: 72
End: 2017-12-26

## 2017-12-26 ENCOUNTER — OFFICE VISIT (OUTPATIENT)
Dept: NEPHROLOGY | Facility: CLINIC | Age: 72
End: 2017-12-26
Payer: MEDICARE

## 2017-12-26 ENCOUNTER — LAB VISIT (OUTPATIENT)
Dept: LAB | Facility: HOSPITAL | Age: 72
End: 2017-12-26
Attending: INTERNAL MEDICINE
Payer: MEDICARE

## 2017-12-26 VITALS
SYSTOLIC BLOOD PRESSURE: 162 MMHG | HEART RATE: 62 BPM | HEIGHT: 65 IN | WEIGHT: 118.19 LBS | DIASTOLIC BLOOD PRESSURE: 68 MMHG | OXYGEN SATURATION: 99 % | BODY MASS INDEX: 19.69 KG/M2

## 2017-12-26 DIAGNOSIS — I10 ESSENTIAL HYPERTENSION: ICD-10-CM

## 2017-12-26 DIAGNOSIS — N18.4 CKD (CHRONIC KIDNEY DISEASE), STAGE IV: Primary | ICD-10-CM

## 2017-12-26 DIAGNOSIS — I50.32 CHRONIC DIASTOLIC CONGESTIVE HEART FAILURE: ICD-10-CM

## 2017-12-26 DIAGNOSIS — I50.32 CHRONIC DIASTOLIC HEART FAILURE: ICD-10-CM

## 2017-12-26 DIAGNOSIS — R80.9 PROTEINURIA, UNSPECIFIED TYPE: ICD-10-CM

## 2017-12-26 DIAGNOSIS — M32.14 OTHER SYSTEMIC LUPUS ERYTHEMATOSUS WITH GLOMERULAR DISEASE: ICD-10-CM

## 2017-12-26 LAB
ANION GAP SERPL CALC-SCNC: 10 MMOL/L
BUN SERPL-MCNC: 63 MG/DL
CALCIUM SERPL-MCNC: 8.4 MG/DL
CHLORIDE SERPL-SCNC: 106 MMOL/L
CO2 SERPL-SCNC: 24 MMOL/L
CREAT SERPL-MCNC: 2 MG/DL
EST. GFR  (AFRICAN AMERICAN): 28.3 ML/MIN/1.73 M^2
EST. GFR  (NON AFRICAN AMERICAN): 24.6 ML/MIN/1.73 M^2
GLUCOSE SERPL-MCNC: 95 MG/DL
POTASSIUM SERPL-SCNC: 3.6 MMOL/L
SODIUM SERPL-SCNC: 140 MMOL/L

## 2017-12-26 PROCEDURE — 36415 COLL VENOUS BLD VENIPUNCTURE: CPT

## 2017-12-26 PROCEDURE — 99999 PR PBB SHADOW E&M-EST. PATIENT-LVL IV: CPT | Mod: PBBFAC,,, | Performed by: INTERNAL MEDICINE

## 2017-12-26 PROCEDURE — 99213 OFFICE O/P EST LOW 20 MIN: CPT | Mod: S$GLB,,, | Performed by: INTERNAL MEDICINE

## 2017-12-26 PROCEDURE — 99499 UNLISTED E&M SERVICE: CPT | Mod: S$GLB,,, | Performed by: INTERNAL MEDICINE

## 2017-12-26 PROCEDURE — 80048 BASIC METABOLIC PNL TOTAL CA: CPT

## 2017-12-26 NOTE — TELEPHONE ENCOUNTER
Mrs Arnold has called in her weight today.  She is starting to compare her home scale readings to our scale and we discussed the slight variations this morning.  She was given 114 lbs on our scale vs 118 on her scale after she originally received a 108 lb reading.  We discussed using different batteries, however, she believes her new scale has lithium ion batteries that do not require a change or will be difficult to change.  We will review her weight in clinic as well today and compare the reading there.  More importantly we review her symptoms today after she remove herself from the low sodium diet she typically incorporates into her diet.  For [place] she had some gumbo and stuffed bell peppers knowing that they contained cured meats.  She sees more swelling today , More in L foot than right and sees a depression that stay for several seconds in the tops of her feet.  She also became winded yesterday while cleaning up a gumbo spill that caused her to exert herself.  We have decided that her weight is likely little more elevated today given her symptoms.  She has been using torsemide 20 mg QD but will take an additional 10 mg this evening to push off the fluid in her feet and ankles.  She understands the less torsemide the better for her at the moment so she will also correct her diet today.  I will review her labs and call if there is a pressing concern in the results.  Otherwise we will review her weight again in the morning and lab results.  She will return to torsemide 20 mg QD tomorrow pending lab work and swelling improvements.  She does not want to formally provide a weight today due to scale results being so different.

## 2017-12-26 NOTE — PROGRESS NOTES
Patient is here today for follow up evaluation of CKD. Last seen in renal office 8/23/17; baseline Cr at that visit; 1.5-1.7 mg/dl . She has an unexpained decline in renal parameters; her most recent lab (12/20/17) Cr 2.4 mg/dl; BUN; 49 MG/DL eGFR 23 ml; potassium 3.8 mmol/L Current meds include; PPI; ARB (irbesartan); torsemide.BUN/Cr is up;  Today she c/o weight loss; fatigue and not feeling well in general       ROS;  Chronically ill appearing woman; no acute distress; oriented  X 3  Mood and Affect; Appropriate  Otherwise non-contributory  Exam  HEENT; Grossly Intact  CHEST; Clear P&A  HEART; RR; S1&S2 II'VI JUJU  No rub gallop  ABD; BS(+) non-tender; (-)CVAT  EXT; (-)Edema    Impression  MARYAM/CKD Multifactorial; Solitary kidney;  Pre-renal component cannot r/o ARB induced hemodynamic changes; also PPI-induced MARYAM  Sx and Exam consistent chronic diastolic heart failure  Repeat labs; Cr 2.0 mg/dl; BUN 63 mg/dl; Adjust diuretics; Cr at baseline    Hypertension Not well controlled  Plan  Await today lab  Return Visit; 3 weeks for BP check

## 2017-12-27 ENCOUNTER — TELEPHONE (OUTPATIENT)
Dept: OTHER | Facility: OTHER | Age: 72
End: 2017-12-27

## 2017-12-27 NOTE — TELEPHONE ENCOUNTER
Mrs Arnold texted in a stable weight today.  We discussed at length her lab results yesterday afternoon.  Her Cr (2.4->2.0) is much improved but not yet back to baseline after reducing her torsemide gradually from 20 mg BID to now 20 mg QD.  She did take 20/10 yesterday for NEERU that resurfaced after eating Xmas dinner that contained some salty meats.  Today she will return to 20 mg QD as she reports improved swelling in her feet and ankles.  When we spoke yesterday, she inquired about her hydralazine dose.  Her hydralazine dose was last altered on 12/7 by Beatriz Hartmann and was increased from 20 mg BID to 50 mg BID, her current dose.  She has also requested the we forward our final note regarding her torsemide dose to her PCP, Dr Ruth.  Weight: 52.3 kg (115 lb 6.4 oz) (12/27/2017  8:26 AM)

## 2017-12-29 ENCOUNTER — TELEPHONE (OUTPATIENT)
Dept: OTHER | Facility: OTHER | Age: 72
End: 2017-12-29

## 2017-12-29 NOTE — TELEPHONE ENCOUNTER
Mrs. Arnold reports a stable weight today.  She says the top of her left foot looked swollen yesterday morning but resolved by the evening.  She denies any other LLE, SOB, abdominal bloating or PND.  She confirms torsemide 20mg daily and has remained on this dose since her [place] with good results.  She is finishing the DMHF monitoring program tomorrow and understands to continue weighing herself daily.  She knows to contact cardiology with a weight change of 3lbs/day, 5lbs/week or the presence of any s/s of HF.  Mrs. Arnold is very meticulous and handles her medical care very well.  She feels comfortable continuing her weight monitoring on her own.  She will text in her weight one final time tomorrow.   Weight: 52.3 kg (115 lb 6.4 oz) (12/29/2017 11:55 AM)

## 2018-01-02 ENCOUNTER — TELEPHONE (OUTPATIENT)
Dept: HEMATOLOGY/ONCOLOGY | Facility: CLINIC | Age: 73
End: 2018-01-02

## 2018-01-02 NOTE — TELEPHONE ENCOUNTER
Spoke with patient regarding follow up with patient and patient refused appointment . Patient verbalized understanding

## 2018-01-03 ENCOUNTER — PATIENT MESSAGE (OUTPATIENT)
Dept: FAMILY MEDICINE | Facility: CLINIC | Age: 73
End: 2018-01-03

## 2018-01-04 ENCOUNTER — PATIENT MESSAGE (OUTPATIENT)
Dept: FAMILY MEDICINE | Facility: CLINIC | Age: 73
End: 2018-01-04

## 2018-01-04 ENCOUNTER — TELEPHONE (OUTPATIENT)
Dept: CARDIOLOGY | Facility: CLINIC | Age: 73
End: 2018-01-04

## 2018-01-04 DIAGNOSIS — R51.9 CHRONIC NONINTRACTABLE HEADACHE, UNSPECIFIED HEADACHE TYPE: ICD-10-CM

## 2018-01-04 DIAGNOSIS — G89.29 CHRONIC NONINTRACTABLE HEADACHE, UNSPECIFIED HEADACHE TYPE: ICD-10-CM

## 2018-01-04 DIAGNOSIS — G89.29 CHRONIC NONINTRACTABLE HEADACHE, UNSPECIFIED HEADACHE TYPE: Primary | ICD-10-CM

## 2018-01-04 DIAGNOSIS — R51.9 CHRONIC NONINTRACTABLE HEADACHE, UNSPECIFIED HEADACHE TYPE: Primary | ICD-10-CM

## 2018-01-04 RX ORDER — BUTALBITAL, ACETAMINOPHEN AND CAFFEINE 50; 325; 40 MG/1; MG/1; MG/1
1 TABLET ORAL EVERY 4 HOURS PRN
Qty: 90 TABLET | Refills: 0 | Status: SHIPPED | OUTPATIENT
Start: 2018-01-04 | End: 2018-02-03

## 2018-01-04 RX ORDER — BUTALBITAL, ACETAMINOPHEN AND CAFFEINE 50; 325; 40 MG/1; MG/1; MG/1
1 TABLET ORAL EVERY 4 HOURS PRN
Qty: 40 TABLET | Refills: 3 | Status: SHIPPED | OUTPATIENT
Start: 2018-01-04 | End: 2018-01-04 | Stop reason: SDUPTHER

## 2018-01-04 RX ORDER — BUTALBITAL, ASPIRIN, AND CAFFEINE 325; 50; 40 MG/1; MG/1; MG/1
1 CAPSULE ORAL EVERY 6 HOURS PRN
Qty: 40 CAPSULE | Refills: 3 | OUTPATIENT
Start: 2018-01-04

## 2018-01-04 NOTE — TELEPHONE ENCOUNTER
----- Message from Celina Figueroa MA sent at 1/4/2018 11:14 AM CST -----  Contact: self  Pt. is calling to increase Torsemide 20 mg. She has fluid in ankles and feet for 2 days. Last saw  Beatriz Hartmann 12/7/1. Please call.

## 2018-01-04 NOTE — TELEPHONE ENCOUNTER
, The pt is calling to report that she has had swelling of her feet and ankles for the last two day.  Says that she is no longer being monitored by the heart failure program [last note 12-29-17].  Says no sob, and weight today was 116.6. Says wieght was 135 previously - is not sure what baseline weight was. Says earlier today, BP prior to taking hydralazine was 180/87 and HR 69.  Repeat BP while on the phone was 157/75. Reviewed cardiac meds with the pt - and is taking as directed per Epic med list except for torsemide - is taking only one 20 mg tablet daily now.  Says has been careful about salty foods - trying to avoid. Please advise. Thanks, Viky

## 2018-01-05 ENCOUNTER — TELEPHONE (OUTPATIENT)
Dept: CARDIOLOGY | Facility: CLINIC | Age: 73
End: 2018-01-05

## 2018-01-05 NOTE — TELEPHONE ENCOUNTER
----- Message from Jolie Roccoroc sent at 1/5/2018  1:27 PM CST -----  Contact: pt   Pt called yesterday to speak with you about her medications.  She states she was supposed to hear back from Beatriz Hartmann and has not received a call yet.  She can be reached @ [phone].  The pt is requesting to hear from her as soon as possible because she does not want to have to wait through the weekend.  LOV 12/7/17.  Thanks

## 2018-01-05 NOTE — TELEPHONE ENCOUNTER
Ms. Arnold reports an increase in her weight of 1.5 to 2 pounds and lower extremity swelling.  She had been taking 1 and 1/2 tablets of torsemide daily and the HF team decreased it to 1 tablet in the morning only.  Denies SOB or orthopnea. Renal function has been stable with CRT between 2 and 2.2.  She has a f/u with Dr. Wyman on 1/15/18. Instructed to take one tablet torsemide in the morning and 1/2 tablet in the afternoon (10mg) for the next 2 days until she loses 2 pounds. Also, instructed pt to call Monday if swelling did not improve over the weekend.  Will message Dr. Wyman to see if she wants a BMP before that visit.

## 2018-01-06 ENCOUNTER — NURSE TRIAGE (OUTPATIENT)
Dept: ADMINISTRATIVE | Facility: CLINIC | Age: 73
End: 2018-01-06

## 2018-01-06 ENCOUNTER — OFFICE VISIT (OUTPATIENT)
Dept: URGENT CARE | Facility: CLINIC | Age: 73
End: 2018-01-06
Payer: MEDICARE

## 2018-01-06 VITALS
RESPIRATION RATE: 16 BRPM | BODY MASS INDEX: 19.16 KG/M2 | WEIGHT: 115 LBS | OXYGEN SATURATION: 99 % | HEIGHT: 65 IN | HEART RATE: 64 BPM | DIASTOLIC BLOOD PRESSURE: 104 MMHG | TEMPERATURE: 97 F | SYSTOLIC BLOOD PRESSURE: 186 MMHG

## 2018-01-06 DIAGNOSIS — I10 UNCONTROLLED HYPERTENSION: Primary | ICD-10-CM

## 2018-01-06 PROCEDURE — 99213 OFFICE O/P EST LOW 20 MIN: CPT | Mod: S$GLB,,, | Performed by: NURSE PRACTITIONER

## 2018-01-07 ENCOUNTER — NURSE TRIAGE (OUTPATIENT)
Dept: ADMINISTRATIVE | Facility: CLINIC | Age: 73
End: 2018-01-07

## 2018-01-07 NOTE — TELEPHONE ENCOUNTER
"    Reason for Disposition   BP  >= 160/100     Triage Callback Scheduled in 1hr; patient had not taken scheduled anihypertensive meds    Answer Assessment - Initial Assessment Questions  1. BLOOD PRESSURE: "What is the blood pressure?" "Did you take at least two measurements 5 minutes apart?"      187/89   2. ONSET: "When did you take your blood pressure?"      Yesterday   3. HOW: "How did you obtain the blood pressure?" (e.g., visiting nurse, automatic home BP monitor)      Home monitor   4. HISTORY: "Do you have a history of high blood pressure?"      Yes  5. MEDICATIONS: "Are you taking any medications for blood pressure?" "Have you missed any doses recently?"      Yes but not a time of call   6. OTHER SYMPTOMS: "Do you have any symptoms?" (e.g., headache, chest pain, blurred vision, difficulty breathing, weakness)      Headache 7-8/10   7. PREGNANCY: "Is there any chance you are pregnant?" "When was your last menstrual period?"      --Age Consideration--    Protocols used: ST HIGH BLOOD PRESSURE-A-AH      "

## 2018-01-07 NOTE — PATIENT INSTRUCTIONS
IF YOU DEVELOP CHEST PAIN, SHORTNESS OR BREATH, NUMBNESS OR TINGLING IN ARM OR HANDS, VOMITING OR BLURRED VISION GO   TO THE EMERGENCY ROOM IMMEDIATELY.    TAKE NIGHT TIME BLOOD PRESSURE MEDICATIONS   CALL CARDIOLOGY ON Monday.   Uncontrolled High Blood Pressure (Established)    Your blood pressure was unusually high today. This can occur if youve missed doses of your blood pressure medicine. Or it can happen if you are taking other medicines. These include some asthma inhalers, decongestants, diet pills, and street drugs like cocaine and amphetamine.  Other causes include:  · Weight gain  · More salt in your diet  · Smoking  · Caffeine  Your blood pressure can also rise if you are emotionally upset or in intense pain. It may go back to normal after a period of rest.  A blood pressure reading is made up of 2 numbers. There is a top number over a bottom number. The top number is the systolic pressure. The bottom number is the diastolic pressure. A normal blood pressure is a systolic pressure of less than 120 over a diastolic pressure of less than 80. High blood pressure (hypertension) is when the top number is 140 or higher. Or it is when the bottom number is 90 or higher. You will see your blood pressure readings written together. For example, a person with a systolic pressure of 118 and a diastolic pressure of 78 will have 118/78 written in the medical record. To be high blood pressure, the numbers must be higher when tested over a period of time. The blood pressures between normal and hypertension are called prehypertension. Prehypertension is a warning sign. The information gives you a chance to make lifestyle changes (weight loss, more exercise) that can keep your blood pressure from going higher.  Home care  Its important to take steps to lower your blood pressure. If you are taking blood pressure medicine, the guidelines below may help you need less or no medicines in the future.  · Begin a weight-loss  program if you are overweight.  · Cut back on the amount of salt in your diet:  ¨ Avoid high-salt foods like olives, pickles, smoked meats, and salted potato chips.  ¨ Dont add salt to your food at the table.  ¨ Use only small amounts of salt when cooking.  · Begin an exercise program. Talk with your health care provider about what exercise program is best for you. It doesnt have to be difficult. Even brisk walking for 20 minutes 3 times a week is a good form of exercise.  · Avoid medicines that stimulates the heart. This includes many over-the-counter cold and sinus decongestant pills and sprays, as well as diet pills. Check the warnings about hypertension on the label. Before purchasing any over-the-counter medicines or supplements, always ask the pharmacist about the product's potential interaction with your high blood pressure and your medicines.  · Stimulants such as amphetamine or cocaine could be lethal for someone with hypertension. Never take these.  · Limit how much caffeine you drink. Or switch to noncaffeinated beverages.  · Stop smoking. If you are a long-time smoker, this can be hard. Enroll in a stop-smoking program to make it more likely that you will succeed. Talk with your provider about ways to quit.  · Learn how to handle stress better. This is an important part of any program to lower blood pressure. Learn ways to relax. These include meditation, yoga, and biofeedback.  · If medicines were prescribed, take them exactly as directed. Missing doses may cause your blood pressure to get out of control.  · If you miss a dose or doses of your medicines, check with your healthcare provider or pharmacist about what to do.  · Consider buying an automatic blood pressure machine. Your provider may recommend a certain type. You can get one of these at most pharmacies. Measure your blood pressure twice a day, in the morning, and in the late afternoon. Keep a written record of your home blood pressure  readings and take the record to your medical appointments.  Here are some additional guidelines on home blood pressure monitoring from the American Heart Association.  · Don't smoke or drink coffee for 30 minutes  · Go to the bathroom before the test.  · Relax for 5 minutes before taking the measurement.  · Sit correctly. Be sure your back is supported. Don't sit on a couch or soft chair. Uncross your feet and place them flat on the floor. Place your arm on a solid, flat surface like a table with the upper arm at heart level. Make certain the middle of the cuff is directly above the eye of the elbow. Check the monitor's instruction manual for an illustration.  · Take multiple readings. When you measure, take 2 or 3 readings one minute apart and record all of the results.  · Take your blood pressure at the same time every day, or as your healthcare provider recommends.  · Record the date, time, and blood pressure reading.  · Take the record with you to your next appointment. If your blood pressure monitor has a built-in memory, simply take the monitor with you to your next appointment.  · Call your provider if you have several high readings. Don't be frightened by a single high reading, but if you get several high readings, check in with your healthcare provider.  · Note: When blood pressure reaches a systolic (top number) of 180 or higher or a diastolic (bottom number) of 110 or higher, emergency medical treatment is required. Call your healthcare provider immediately.  Follow-up care  Regular visits to your own healthcare provider for blood pressure and medicine checks are an important part of your care. Make a follow-up appointment as directed. Bring the record of your home blood pressure readings to the appointment.  When to seek medical advice  Call your healthcare provider right away if any of these occur:  · Blood pressure reaches a systolic (top number) of 180 or higher or diastolic (bottom number) of 110 or  higher, emergency medical treatment is required.  · Chest, arm, shoulder, neck, or upper back pain  · Shortness of breath  · Severe headache  · Throbbing or rushing sound in the ears  · Nosebleed  · Extreme drowsiness, confusion, or fainting  · Dizziness or dizziness with spinning sensation (vertigo)  · Weakness in an arm or leg or on one side of the face  · Trouble speaking or seeing   Date Last Reviewed: 1/1/2017 © 2000-2017 [employer]. [address] [place], [place] [id number]. All rights reserved. This information is not intended as a substitute for professional medical care. Always follow your healthcare professional's instructions.

## 2018-01-07 NOTE — TELEPHONE ENCOUNTER
Admit 12/20  Pt called re BP high x 2 couple. Pt currently awaiting eval in UC. Call back with questions.     Reason for Disposition   Caller has already spoken with the PCP and has no further questions.    Protocols used: ST NO CONTACT OR DUPLICATE CONTACT CALL-A-AH

## 2018-01-07 NOTE — TELEPHONE ENCOUNTER
Scheduled callback conducted repeated post-med B/P 153/69 HR 82; Patient advised to arrange follow-up visit for evaluation, assessment and medication review; understood and agreed to such.

## 2018-01-07 NOTE — PROGRESS NOTES
"Subjective:       Patient ID: Ewelina Arnold is a 72 y.o. female.    Vitals:  height is 5' 5" (1.651 m) and weight is 52.2 kg (115 lb). Her temperature is 97.1 °F (36.2 °C). Her blood pressure is 201/87 (abnormal) and her pulse is 64. Her respiration is 16 and oxygen saturation is 99%.     Chief Complaint: Hypertension    PATIENT REPORTS TO CLINIC WITH C/O ELEVATED BLOOD PRESSURES OVER THE PAST 2 DAYS WITH A HEADACHE. SHE HAS HAD COMMUNICATION WITH CARDIOLOGY. SHE HAS NOT TAKEN COREG OR TORESMIDE NIGHTLY DOSE. DENIES SHORTNESS OF BREATH, CHEST PAIN, NUMBNESS OR TINGLING IN EXTREMITIES.       Hypertension   This is a chronic problem. The current episode started yesterday. The problem has been gradually worsening since onset. Associated symptoms include headaches.     Review of Systems   Neurological: Positive for headaches.       Objective:      Physical Exam   Constitutional: She is oriented to person, place, and time. She appears well-developed and well-nourished. She is cooperative.  Non-toxic appearance. She does not appear ill. No distress.   HENT:   Head: Normocephalic and atraumatic.   Right Ear: Hearing, tympanic membrane, external ear and ear canal normal.   Left Ear: Hearing, tympanic membrane, external ear and ear canal normal.   Nose: Nose normal. No mucosal edema, rhinorrhea or nasal deformity. No epistaxis. Right sinus exhibits no maxillary sinus tenderness and no frontal sinus tenderness. Left sinus exhibits no maxillary sinus tenderness and no frontal sinus tenderness.   Mouth/Throat: Uvula is midline, oropharynx is clear and moist and mucous membranes are normal. No trismus in the jaw. Normal dentition. No uvula swelling. No posterior oropharyngeal erythema.   Eyes: Conjunctivae and lids are normal. Right eye exhibits no discharge. Left eye exhibits no discharge. No scleral icterus.   Sclera clear bilat   Neck: Trachea normal, normal range of motion, full passive range of motion without pain and " phonation normal. Neck supple.   Cardiovascular: Normal rate, regular rhythm, normal heart sounds, intact distal pulses and normal pulses.    Pulmonary/Chest: Effort normal and breath sounds normal. No respiratory distress.   Abdominal: Soft. Normal appearance and bowel sounds are normal. She exhibits no distension, no pulsatile midline mass and no mass. There is no tenderness.   Musculoskeletal: Normal range of motion. She exhibits no edema or deformity.   Neurological: She is alert and oriented to person, place, and time. She exhibits normal muscle tone. Coordination normal.   Skin: Skin is warm, dry and intact. She is not diaphoretic. No pallor.   Psychiatric: She has a normal mood and affect. Her speech is normal and behavior is normal. Judgment and thought content normal. Cognition and memory are normal.   Nursing note and vitals reviewed.      Assessment:       1. Uncontrolled hypertension        Plan:         Uncontrolled hypertension      Patient Instructions   IF YOU DEVELOP CHEST PAIN, SHORTNESS OR BREATH, NUMBNESS OR TINGLING IN ARM OR HANDS, VOMITING OR BLURRED VISION GO TO THE EMERGENCY ROOM IMMEDIATELY.  TAKE NIGHT TIME BLOOD PRESSURE MEDICATIONS   CALL CARDIOLOGY ON Monday.   Uncontrolled High Blood Pressure (Established)    Your blood pressure was unusually high today. This can occur if youve missed doses of your blood pressure medicine. Or it can happen if you are taking other medicines. These include some asthma inhalers, decongestants, diet pills, and street drugs like cocaine and amphetamine.  Other causes include:  · Weight gain  · More salt in your diet  · Smoking  · Caffeine  Your blood pressure can also rise if you are emotionally upset or in intense pain. It may go back to normal after a period of rest.  A blood pressure reading is made up of 2 numbers. There is a top number over a bottom number. The top number is the systolic pressure. The bottom number is the diastolic pressure. A normal  blood pressure is a systolic pressure of less than 120 over a diastolic pressure of less than 80. High blood pressure (hypertension) is when the top number is 140 or higher. Or it is when the bottom number is 90 or higher. You will see your blood pressure readings written together. For example, a person with a systolic pressure of 118 and a diastolic pressure of 78 will have 118/78 written in the medical record. To be high blood pressure, the numbers must be higher when tested over a period of time. The blood pressures between normal and hypertension are called prehypertension. Prehypertension is a warning sign. The information gives you a chance to make lifestyle changes (weight loss, more exercise) that can keep your blood pressure from going higher.  Home care  Its important to take steps to lower your blood pressure. If you are taking blood pressure medicine, the guidelines below may help you need less or no medicines in the future.  · Begin a weight-loss program if you are overweight.  · Cut back on the amount of salt in your diet:  ¨ Avoid high-salt foods like olives, pickles, smoked meats, and salted potato chips.  ¨ Dont add salt to your food at the table.  ¨ Use only small amounts of salt when cooking.  · Begin an exercise program. Talk with your health care provider about what exercise program is best for you. It doesnt have to be difficult. Even brisk walking for 20 minutes 3 times a week is a good form of exercise.  · Avoid medicines that stimulates the heart. This includes many over-the-counter cold and sinus decongestant pills and sprays, as well as diet pills. Check the warnings about hypertension on the label. Before purchasing any over-the-counter medicines or supplements, always ask the pharmacist about the product's potential interaction with your high blood pressure and your medicines.  · Stimulants such as amphetamine or cocaine could be lethal for someone with hypertension. Never take  these.  · Limit how much caffeine you drink. Or switch to noncaffeinated beverages.  · Stop smoking. If you are a long-time smoker, this can be hard. Enroll in a stop-smoking program to make it more likely that you will succeed. Talk with your provider about ways to quit.  · Learn how to handle stress better. This is an important part of any program to lower blood pressure. Learn ways to relax. These include meditation, yoga, and biofeedback.  · If medicines were prescribed, take them exactly as directed. Missing doses may cause your blood pressure to get out of control.  · If you miss a dose or doses of your medicines, check with your healthcare provider or pharmacist about what to do.  · Consider buying an automatic blood pressure machine. Your provider may recommend a certain type. You can get one of these at most pharmacies. Measure your blood pressure twice a day, in the morning, and in the late afternoon. Keep a written record of your home blood pressure readings and take the record to your medical appointments.  Here are some additional guidelines on home blood pressure monitoring from the American Heart Association.  · Don't smoke or drink coffee for 30 minutes  · Go to the bathroom before the test.  · Relax for 5 minutes before taking the measurement.  · Sit correctly. Be sure your back is supported. Don't sit on a couch or soft chair. Uncross your feet and place them flat on the floor. Place your arm on a solid, flat surface like a table with the upper arm at heart level. Make certain the middle of the cuff is directly above the eye of the elbow. Check the monitor's instruction manual for an illustration.  · Take multiple readings. When you measure, take 2 or 3 readings one minute apart and record all of the results.  · Take your blood pressure at the same time every day, or as your healthcare provider recommends.  · Record the date, time, and blood pressure reading.  · Take the record with you to your  next appointment. If your blood pressure monitor has a built-in memory, simply take the monitor with you to your next appointment.  · Call your provider if you have several high readings. Don't be frightened by a single high reading, but if you get several high readings, check in with your healthcare provider.  · Note: When blood pressure reaches a systolic (top number) of 180 or higher or a diastolic (bottom number) of 110 or higher, emergency medical treatment is required. Call your healthcare provider immediately.  Follow-up care  Regular visits to your own healthcare provider for blood pressure and medicine checks are an important part of your care. Make a follow-up appointment as directed. Bring the record of your home blood pressure readings to the appointment.  When to seek medical advice  Call your healthcare provider right away if any of these occur:  · Blood pressure reaches a systolic (top number) of 180 or higher or diastolic (bottom number) of 110 or higher, emergency medical treatment is required.  · Chest, arm, shoulder, neck, or upper back pain  · Shortness of breath  · Severe headache  · Throbbing or rushing sound in the ears  · Nosebleed  · Extreme drowsiness, confusion, or fainting  · Dizziness or dizziness with spinning sensation (vertigo)  · Weakness in an arm or leg or on one side of the face  · Trouble speaking or seeing   Date Last Reviewed: 1/1/2017  © 2605-1205 [employer]. [address] [place] [address], [place] [address]. All rights reserved. This information is not intended as a substitute for professional medical care. Always follow your healthcare professional's instructions.

## 2018-01-08 ENCOUNTER — TELEPHONE (OUTPATIENT)
Dept: CARDIOLOGY | Facility: CLINIC | Age: 73
End: 2018-01-08

## 2018-01-08 ENCOUNTER — OUTPATIENT CASE MANAGEMENT (OUTPATIENT)
Dept: ADMINISTRATIVE | Facility: OTHER | Age: 73
End: 2018-01-08

## 2018-01-08 DIAGNOSIS — I10 ESSENTIAL HYPERTENSION: Primary | ICD-10-CM

## 2018-01-08 NOTE — TELEPHONE ENCOUNTER
----- Message from Jolie العلي sent at 1/8/2018 10:12 AM CST -----  Contact: pt   PT called to change some medications.  She states her BP was very high over the weekend.  She is a pt of Beatriz Hartmann LOV 12/7/17.  She can be reached @ [phone].  Thanks!!

## 2018-01-08 NOTE — PROGRESS NOTES
1/8/17 chart reviewed in [facility] and no new ed or hospitalizations noted.  Noted several communications to dr. castle and cardiology about headache and hypertension    Progress towards goals:      LT goal 1  Patient/caregiver will accept life style changes to manage and improve symptoms of chf prior to discharge from [facility]. - Priority: high  Short Term Goals  1.  Patient/caregiver will measure and record weights daily and notify doctor if patient gains more than 3 pounds in one day or 5 pounds in one week within 1 month.  Partially met            Pt was discharged from the digital chf program.  She is well educated on the sign and symptoms to report.  Pt was having wt gain and swelling in feet and she contacted her provider and was advised to increase torsemide to bid for 2 days.  She did and wt back to 115 and swelling to feet resolved.  Pt admits that she was off her diet during the holidays and ate some things higher in sodium.  She states she is now back on sodium restriction   Recognize and provide educational material (BRANDON).  Heart Failure: Warning signs of a Flare Up, [facility]s Discharge instructions for Patients with Heart Failure.      Reviewed handout with pt and she verbalized understanidng                                                                                                      2.  Patient/Caregiver will verbalize importance of early intervention for symptoms of CHF and verbalize 2 ways of preventing complications due to disease process within 1 month       Partially met   Recognize and provide educational material (BRANDON).  Low - Salt Choices, Managing your Heart failure. Taking medicine to Control Heart Failure.     Pt states she started with a headache and also noted her blood pressure was up.  Not sure of the sequence of what came first.  Pt states she was seen over the weekend at urgent care.  States she occasionally has migraines.  Pt states she took her migraine medication and  head ache is resolved blood pressure today was 156/78.  This is normal range.  Not sure what came first but now that headache is relieved the blood pressure is back to baseline.   Pt was very disappointed about the end of the digital chf program, states she feels like it helped her out a lot.  Asked her about declining the digital hypertension program and she states she does not recall declining and would like to enroll in the program if it is available to her.  Message sent to dr. castle about possible resending the order and advised pt to discuss with cardiology as well when the nurse practitioner calls this evening.  She verbalized understanding.    LT goal 2   Patient/caregiver will have knowledge of resources/benefits available from [employer] managed care policy in order to obtain the services/benefits that patient is eligible for or needs prior to discharge from [facility].    Short Term Goals  1.  Patient/caregiver will verbalize understanding of appropriate use of [employer] health care benefits within 2 months.   In progress         Patient states she received her meals from [facility] last week  Sodium content is only 426 per serving.  Pt states her new cardiac plus plan is going into effect on jan 1 2018 and this will be a big help for her copay reduction with all the specialist she has to see  Pt states she got her papers from [facility] and she has faxed them to pcp for signature and verification of chronic condition.    Plan   Follow up on message to dr. castle about digital hypertension program  Follow u p on response from np in cardiology  Continue to review the information mailed to pt    Follow up on wt and blood pressure  Follow up on start of walking on treadmill       Clinical Reference Documents Added to Patient Instructions       Document    DIET, DISCHARGE INSTRUCTIONS FOR EATING A LOW-SALT  (ENGLISH)    HEART FAILURE, CONGESTIVE (CHF) (ENGLISH)    HEART FAILURE, DISCHARGE INSTRUCTIONS FOR (ENGLISH)    HEART  FAILURE: BEING ACTIVE (ENGLISH)    HEART FAILURE: MAKING CHANGES TO YOUR DIET (ENGLISH)    HEART FAILURE: TRACKING YOUR WEIGHT (ENGLISH)    HEART FAILURE: WARNING SIGNS OF A FLARE-UP (ENGLISH)    LOW-SALT CHOICES (ENGLISH)    MY HEART FAILURE SYMPTOMS CHART (ENGLISH)    SALT, TIPS FOR USING LESS (ENGLISH)

## 2018-01-08 NOTE — TELEPHONE ENCOUNTER
Beatriz Hartmann NP,         LOV:12/7/17.The pt said that she spoke to you on 12/5/17 about wt gain and she was told to increase the Torsemide 20 mg to 20 mg Q AM and half tab (10 mg) Q PM.She said that she weighed 115.6 lb on 1/6/17,114 lb on 1/7 and 115.6 lb on 1/8.She said that her b/p was elevated on Sat 1/6/17 = 192/80.She went to [facility] on Sat and they said her b/p was 202/? But they did not increase any of her meds, they told her to take her regularly scheduled meds for the PM.She said that her b/p on Sunday in AM was 187/89,@ 11 AM = 153/69,@ 7 PM =169/80,@10 PM=145/68.Today her b/p was 158/75,p=69.Reviewed her b/p meds and she is taking them as Rx'd.She did c/o of a head aches over the weekend and it was relieved with the Fioricet. She wanted to see if you would like to change any of her b/p medications and to see if she should still be taking the Torsemide 20 mg 1 tab Q AM and half tab Q PM.Please advise.Thanks,Zoë

## 2018-01-08 NOTE — TELEPHONE ENCOUNTER
Pt agreed to come in on Wed to see Beatriz Hartmann at 8:40 AM & BMP scheduled for Tues at [facility] as well.

## 2018-01-09 ENCOUNTER — LAB VISIT (OUTPATIENT)
Dept: LAB | Facility: HOSPITAL | Age: 73
End: 2018-01-09
Attending: FAMILY MEDICINE
Payer: MEDICARE

## 2018-01-09 ENCOUNTER — TELEPHONE (OUTPATIENT)
Dept: FAMILY MEDICINE | Facility: CLINIC | Age: 73
End: 2018-01-09

## 2018-01-09 DIAGNOSIS — I10 ESSENTIAL HYPERTENSION: ICD-10-CM

## 2018-01-09 LAB
ANION GAP SERPL CALC-SCNC: 10 MMOL/L
BUN SERPL-MCNC: 45 MG/DL
CALCIUM SERPL-MCNC: 8.8 MG/DL
CHLORIDE SERPL-SCNC: 106 MMOL/L
CO2 SERPL-SCNC: 21 MMOL/L
CREAT SERPL-MCNC: 2 MG/DL
EST. GFR  (AFRICAN AMERICAN): 28.1 ML/MIN/1.73 M^2
EST. GFR  (NON AFRICAN AMERICAN): 24.4 ML/MIN/1.73 M^2
GLUCOSE SERPL-MCNC: 60 MG/DL
POTASSIUM SERPL-SCNC: 4.6 MMOL/L
SODIUM SERPL-SCNC: 137 MMOL/L

## 2018-01-09 PROCEDURE — 36415 COLL VENOUS BLD VENIPUNCTURE: CPT | Mod: PO

## 2018-01-09 PROCEDURE — 80048 BASIC METABOLIC PNL TOTAL CA: CPT

## 2018-01-09 NOTE — TELEPHONE ENCOUNTER
----- Message from Nimo Meracdo RN sent at 1/8/2018  2:56 PM CST -----  Pt was very satisfies with the digital chf program and is interested in the digital hypertension program from [facility].  I see that originallly she declined enrollment in digital hypertension but she has reconsidered.  Could you please put in orders.    Thanks,  Nimo Mercado RN,[facility]  Outpatient Complex Case Management  [phone]

## 2018-01-10 ENCOUNTER — TELEPHONE (OUTPATIENT)
Dept: NEPHROLOGY | Facility: CLINIC | Age: 73
End: 2018-01-10

## 2018-01-10 ENCOUNTER — OFFICE VISIT (OUTPATIENT)
Dept: CARDIOLOGY | Facility: CLINIC | Age: 73
End: 2018-01-10
Payer: MEDICARE

## 2018-01-10 VITALS
SYSTOLIC BLOOD PRESSURE: 167 MMHG | DIASTOLIC BLOOD PRESSURE: 75 MMHG | BODY MASS INDEX: 19.62 KG/M2 | WEIGHT: 117.75 LBS | HEART RATE: 60 BPM | HEIGHT: 65 IN

## 2018-01-10 DIAGNOSIS — E78.00 PURE HYPERCHOLESTEROLEMIA: Chronic | ICD-10-CM

## 2018-01-10 DIAGNOSIS — I25.84 CORONARY ARTERY DISEASE DUE TO CALCIFIED CORONARY LESION: ICD-10-CM

## 2018-01-10 DIAGNOSIS — I77.9 BILATERAL CAROTID ARTERY DISEASE: Chronic | ICD-10-CM

## 2018-01-10 DIAGNOSIS — I35.0 NONRHEUMATIC AORTIC VALVE STENOSIS: Chronic | ICD-10-CM

## 2018-01-10 DIAGNOSIS — I10 ESSENTIAL HYPERTENSION: Primary | Chronic | ICD-10-CM

## 2018-01-10 DIAGNOSIS — I50.32 CHRONIC DIASTOLIC HEART FAILURE: Chronic | ICD-10-CM

## 2018-01-10 DIAGNOSIS — N18.4 CKD (CHRONIC KIDNEY DISEASE), STAGE IV: ICD-10-CM

## 2018-01-10 DIAGNOSIS — I25.10 CORONARY ARTERY DISEASE DUE TO CALCIFIED CORONARY LESION: ICD-10-CM

## 2018-01-10 DIAGNOSIS — I73.9 PERIPHERAL ARTERIAL DISEASE: Chronic | ICD-10-CM

## 2018-01-10 DIAGNOSIS — I10 ESSENTIAL HYPERTENSION: Chronic | ICD-10-CM

## 2018-01-10 DIAGNOSIS — Z98.61 POST PTCA: Chronic | ICD-10-CM

## 2018-01-10 DIAGNOSIS — Z90.5 SOLITARY KIDNEY, ACQUIRED: ICD-10-CM

## 2018-01-10 PROCEDURE — 99215 OFFICE O/P EST HI 40 MIN: CPT | Mod: S$GLB,,, | Performed by: NURSE PRACTITIONER

## 2018-01-10 PROCEDURE — 99499 UNLISTED E&M SERVICE: CPT | Mod: S$GLB,,, | Performed by: NURSE PRACTITIONER

## 2018-01-10 PROCEDURE — 99999 PR PBB SHADOW E&M-EST. PATIENT-LVL III: CPT | Mod: PBBFAC,,, | Performed by: NURSE PRACTITIONER

## 2018-01-10 RX ORDER — IRBESARTAN 300 MG/1
300 TABLET ORAL NIGHTLY
Qty: 90 TABLET | Refills: 3 | Status: ON HOLD | OUTPATIENT
Start: 2018-01-10 | End: 2018-01-25 | Stop reason: HOSPADM

## 2018-01-10 RX ORDER — CLONIDINE HYDROCHLORIDE 0.1 MG/1
0.1 TABLET ORAL 2 TIMES DAILY
Qty: 60 TABLET | Refills: 1 | Status: ON HOLD | OUTPATIENT
Start: 2018-01-10 | End: 2018-01-16 | Stop reason: HOSPADM

## 2018-01-10 RX ORDER — CLONIDINE HYDROCHLORIDE 0.1 MG/1
TABLET ORAL
Qty: 180 TABLET | Refills: 1 | OUTPATIENT
Start: 2018-01-10

## 2018-01-10 RX ORDER — HYDRALAZINE HYDROCHLORIDE 50 MG/1
50 TABLET, FILM COATED ORAL EVERY 12 HOURS
Qty: 180 TABLET | Refills: 3 | Status: ON HOLD | OUTPATIENT
Start: 2018-01-10 | End: 2018-01-16 | Stop reason: HOSPADM

## 2018-01-10 NOTE — PROGRESS NOTES
Ms. Arnold is a patient of Dr. Wyman and was last seen in [facility] 12/7/2017.    Subjective:   Patient ID:  Ewelina Arnold is a 72 y.o. female who presents for follow-up of Hypertension  Problems:   HFpEF, EF 55-60%  CKD stage IV  HLD  HTN  Hypothyroidism  PAD  Living donor, kidney in 1985  45 pack year h/o smoking, quit ~[id number]  Moderate aortic stenosis, PABLITO = 0.93 cm2, peak velocity = 3.5 m/s, mean gradient = 28 mmHg.   Pulmonary hypertension, 68 mmHg  CAD based on CT chest in 9/2016  Aortic atherosclerosis  Carotid artery disease, left (1-39% in 2012)    HPI  Ms. Arnold is in clinic for HFpEF follow up.  She has had some difficulty maintaining her weight with current diuretic therapy and blood pressure has not been well controlled.  Reports weight has been better controlled after taking a couple of days of a 1/2 torsemide in the afternoon.  Lower extremity swelling has resolved. Patient denies chest pain with exertion or at rest, palpitations, SOB, PEDRO, dizziness, syncope, orthopnea, PND, or claudication.  Reports no routine exercise.  Hypertension is treated with ARB (irbesartan 300mg), hydralazine 50mg BID, and beta blocker (carvedilol 12.5mg BID).  Reports following a low salt diet. Home blood pressure readings are 140-180s at varying times of day.  Her renal function is poor, stage IV. He is treated with low dose ASA and high intensity statin.  Patient is taking rosuvastatin 40mg.  She has not had lipids drawn since initiation of statin therapy, which was only about 3-4 weeks ago.      Review of Systems   Constitution: Negative for decreased appetite, diaphoresis, weakness, malaise/fatigue, weight gain and weight loss.   Eyes: Negative for visual disturbance.   Cardiovascular: Negative for chest pain, claudication, dyspnea on exertion, irregular heartbeat, leg swelling, near-syncope, orthopnea, palpitations, paroxysmal nocturnal dyspnea and syncope.        Denies chest pressure   Respiratory:  Negative for cough, hemoptysis, shortness of breath, sleep disturbances due to breathing and snoring.    Endocrine: Negative for cold intolerance and heat intolerance.   Hematologic/Lymphatic: Negative for bleeding problem. Does not bruise/bleed easily.   Musculoskeletal: Negative for myalgias.   Gastrointestinal: Negative for bloating, abdominal pain, anorexia, change in bowel habit, constipation, diarrhea, nausea and vomiting.   Neurological: Negative for difficulty with concentration, disturbances in coordination, excessive daytime sleepiness, dizziness, headaches, light-headedness, loss of balance and numbness.   Psychiatric/Behavioral: The patient does not have insomnia.      Allergies and current medications updated and reviewed:  Review of patient's allergies indicates:   Allergen Reactions    Ace inhibitors      Other reaction(s): Lips Swelling    Vioxx [rofecoxib]      Other reaction(s): lips swelling    Bactrim [sulfamethoxazole-trimethoprim]      Pt would like this medication to be added due to elevation of creatinine with single kidney.    Arthrotec 50 [diclofenac-misoprostol]      Other reaction(s): Unknown    Bentyl [dicyclomine]      Not effective    Doxycycline      nausea    Imdur [isosorbide mononitrate] Nausea Only    Lomotil [diphenoxylate-atropine]      Stomach cramps, pt vomitted    Lozol [indapamide] Rash    Tramadol Nausea Only     Current Outpatient Prescriptions   Medication Sig    acetaminophen (TYLENOL) 500 MG tablet Take 500 mg by mouth every 6 (six) hours as needed for Pain.    ammonium lactate 12 % Crea Apply 1 application topically 2 (two) times daily as needed.    butalbital-acetaminophen-caffeine -40 mg (FIORICET, ESGIC) -40 mg per tablet Take 1 tablet by mouth every 4 (four) hours as needed for Pain.    carvedilol (COREG) 12.5 MG tablet Take 1 tablet (12.5 mg total) by mouth 2 (two) times daily with meals.    citalopram (CELEXA) 20 MG tablet TAKE 1 TABLET  (20 MG TOTAL) BY MOUTH ONCE DAILY.    conjugated estrogens (PREMARIN) vaginal cream Use 1g nightly for two weeks, then 1g twice per week.    ENSURE ACTIVE CLEAR Liqd Take 240 mLs by mouth 2 (two) times daily.    hydrALAZINE (APRESOLINE) 50 MG tablet Take 1 tablet (50 mg total) by mouth every 12 (twelve) hours.    hydroxychloroquine (PLAQUENIL) 200 mg tablet TAKE 1 TABLET TWICE DAILY    hyoscyamine (LEVSIN/SL) 0.125 mg Subl Place 1 tablet (0.125 mg total) under the tongue 2 (two) times daily as needed.    ipratropium (ATROVENT) 0.03 % nasal spray 2 sprays by Nasal route 2 (two) times daily as needed for Rhinitis.    latanoprost 0.005 % ophthalmic solution INSTILL 1 DROP INTO BOTH EYES EVERY DAY    levothyroxine (SYNTHROID) 75 MCG tablet Take 1 tablet (75 mcg total) by mouth before breakfast.    meclizine (ANTIVERT) 12.5 mg tablet Take 1 tablet (12.5 mg total) by mouth 3 (three) times daily as needed.    multivitamin capsule Take 1 capsule by mouth once daily.    nitroGLYCERIN (NITROSTAT) 0.4 MG SL tablet Place 1 tablet (0.4 mg total) under the tongue every 5 (five) minutes as needed for Chest pain.    nystatin (MYCOSTATIN) ointment Apply topically 2 (two) times daily.    nystatin-triamcinolone (MYCOLOG II) cream Apply topically 4 (four) times daily.    omeprazole (PRILOSEC) 40 MG capsule Take 1 capsule (40 mg total) by mouth once daily.    rosuvastatin (CRESTOR) 40 MG Tab TAKE 1 TABLET EVERY EVENING    TENS units Sravanthi 1 application by Misc.(Non-Drug; Combo Route) route daily as needed (pain).    torsemide (DEMADEX) 20 MG Tab Take 1 tablet (20 mg total) by mouth 2 (two) times daily. (Patient taking differently: Take 20 mg by mouth once daily. )    triamcinolone acetonide 0.1% (KENALOG) 0.1 % paste PLACE ONTO TEETH TWICE DAILY    VITAMIN D2 50,000 unit capsule TAKE 1 CAPSULE (50,000 UNITS TOTAL) EVERY 30 DAYS    aspirin (ECOTRIN) 81 MG EC tablet Take 1 tablet (81 mg total) by mouth once daily.  "(Patient taking differently: Take 81 mg by mouth. )    betamethasone dipropionate (DIPROLENE) 0.05 % cream Apply topically 2 (two) times daily as needed.    cholestyramine (QUESTRAN) 4 gram packet Take 1 packet (4 g total) by mouth 3 (three) times daily with meals.    fexofenadine (ALLEGRA) 180 MG tablet Take 1 tablet (180 mg total) by mouth once daily. (Patient taking differently: Take 180 mg by mouth as needed. )    irbesartan (AVAPRO) 300 MG tablet Take 1 tablet (300 mg total) by mouth every evening.     No current facility-administered medications for this visit.      Objective:     Right Arm BP - Sittin/77 (01/10/18 0849)  Left Arm BP - Sittin/75 (01/10/18 0849)    BP (!) 167/75 (BP Location: Left arm, Patient Position: Sitting, BP Method: Medium (Automatic))   Pulse 60   Ht 5' 5" (1.651 m)   Wt 53.4 kg (117 lb 11.6 oz)   LMP  (LMP Unknown)   BMI 19.59 kg/m²     Physical Exam   Constitutional: She is oriented to person, place, and time. Vital signs are normal. She appears well-developed and well-nourished. She is active. No distress.   HENT:   Head: Normocephalic and atraumatic.   Eyes: Conjunctivae and lids are normal. No scleral icterus.   Neck: Neck supple. Normal carotid pulses, no hepatojugular reflux and no JVD present. Carotid bruit is not present.   Cardiovascular: Normal rate, regular rhythm, S1 normal, S2 normal and intact distal pulses.  PMI is not displaced.  Exam reveals no gallop and no friction rub.    Murmur heard.   Harsh midsystolic murmur is present with a grade of 2/6  at the upper right sternal border radiating to the neck  Pulses:       Carotid pulses are 2+ on the right side, and 2+ on the left side.       Radial pulses are 2+ on the right side, and 2+ on the left side.        Dorsalis pedis pulses are 2+ on the right side, and 2+ on the left side.        Posterior tibial pulses are 1+ on the right side, and 1+ on the left side.   Pulmonary/Chest: Effort normal and " breath sounds normal. No respiratory distress. She has no decreased breath sounds. She has no wheezes. She has no rhonchi. She has no rales. She exhibits no tenderness.   Abdominal: Soft. Normal appearance and bowel sounds are normal. She exhibits no distension, no fluid wave, no abdominal bruit, no ascites and no pulsatile midline mass. There is no hepatosplenomegaly. There is no tenderness.   Musculoskeletal: She exhibits no edema.   Neurological: She is alert and oriented to person, place, and time. Gait normal.   Skin: Skin is warm, dry and intact. No rash noted. She is not diaphoretic. Nails show no clubbing.   Psychiatric: She has a normal mood and affect. Her speech is normal and behavior is normal. Judgment and thought content normal. Cognition and memory are normal.   Nursing note and vitals reviewed.      Chemistry        Component Value Date/Time     01/09/2018 1111    K 4.6 01/09/2018 1111     01/09/2018 1111    CO2 21 (L) 01/09/2018 1111    BUN 45 (H) 01/09/2018 1111    CREATININE 2.0 (H) 01/09/2018 1111    GLU 60 (L) 01/09/2018 1111        Component Value Date/Time    CALCIUM 8.8 01/09/2018 1111    ALKPHOS 74 12/07/2017 1048    AST 48 (H) 12/07/2017 1048    ALT 20 12/07/2017 1048    BILITOT 0.3 12/07/2017 1048    ESTGFRAFRICA 28.1 (A) 01/09/2018 1111    EGFRNONAA 24.4 (A) 01/09/2018 1111        Lab Results   Component Value Date    HGBA1C 5.7 01/24/2017       Recent Labs  Lab 03/23/17  0952  11/17/17  2118  11/28/17  0433  12/07/17  1048 12/20/17  0655   WHITE BLOOD CELL COUNT 3.72 L  < > 3.61 L  < > 4.68  < > 3.40 L 3.04 L   HEMOGLOBIN 9.9 L  < > 9.7 L  < > 9.1 L  < > 9.5 L 9.7 L   HEMATOCRIT 30.8 L  < > 30.9 L  < > 28.5 L  < > 30.8 L 30.9 L   MCV 83  < > 80 L  < > 81 L  < > 81 L 81 L   PLATELETS 209  < > 217  < > 215  < > 228 178   BNP  --   --  1,073 H  --  2,331 H  --  1,304 H  --    TSH 0.935  < >  --   --  2.453  --   --   --    CHOLESTEROL 123  --   --   --   --   --   --   --    HDL  39 L  --   --   --   --   --   --   --    LDL CHOLESTEROL 68.2  --   --   --   --   --   --   --    TRIGLYCERIDES 79  --   --   --   --   --   --   --    HDL/CHOLESTEROL RATIO 31.7  --   --   --   --   --   --   --    < > = values in this interval not displayed.  Lab Results   Component Value Date    IRON 31 11/29/2017    TIBC 317 11/29/2017    FERRITIN 202 11/29/2017       Recent Labs  Lab 11/27/17  1200   INR 1.0        Test(s) Reviewed  I have reviewed the following in detail:  [] Stress test   [] Angiography   [x] Echocardiogram   [x] Labs   [] Other:         Assessment/Plan:     Essential hypertension  Comments:  BP not at goal <130/80. Add clonidine 0.1mg BID to current regimen. 2gm sodium diet. Instructed to take BP at varying times of day and email log in 2 weeks.   Orders:  -     cloNIDine (CATAPRES) 0.1 MG tablet; Take 1 tablet (0.1 mg total) by mouth 2 (two) times daily.  Dispense: 60 tablet; Refill: 1  -     irbesartan (AVAPRO) 300 MG tablet; Take 1 tablet (300 mg total) by mouth every evening.  Dispense: 90 tablet; Refill: 3  -     hydrALAZINE (APRESOLINE) 50 MG tablet; Take 1 tablet (50 mg total) by mouth every 12 (twelve) hours.  Dispense: 180 tablet; Refill: 3    Coronary artery disease due to calcified coronary lesion by CT scan 9/2016  Comments:  Asymptomatic. Taking high intensity statin therapy and low dose ASA. No changes.  Orders:  -     Lipid panel; Future; Expected date: 02/21/2018    Post PTCA    Bilateral carotid artery disease  Comments:  Mild carotid disease noted bilaterally in 2012. Taking high intensity statin therapy and low dose ASA. No changes.    Chronic diastolic heart failure  Comments:  Well compensated. Continue daily weights and torsemide 20mg qam. Torsemide 10mg in the qpm PRN weight gain/leg swelling/SOB.     Pure hypercholesterolemia  Comments:  LDL after starting therapy unknown. Lipid panel in 4-6 weeks. If LDL remains >70, plan to start zetia 10mg by mouth daily.    Orders:  -     Comprehensive metabolic panel; Future; Expected date: 02/21/2018  -     Lipid panel; Future; Expected date: 02/21/2018    Nonrheumatic aortic valve stenosis  Comments:  Moderate AS. 2/6 systolic murmur noted. Monitor for changes in symptoms and exam.     Peripheral arterial disease  Comments:  Asymptomatic. Encouraged to walk 30 minutes a day for 4-5 times a week.     CKD (chronic kidney disease), stage IV  Comments:  Followed by Dr. Ruffin in nephrology. CRT stable high, 2.0    Solitary kidney, acquired       40 minutes face to face time with Counseling More Than 50% of the Appointment Time was spent discussing congestive heart failure and renovascular resistant hypertension.  Patient had many questions regarding medications and CHF.      Return in about 6 weeks (around 2/21/2018).

## 2018-01-10 NOTE — TELEPHONE ENCOUNTER
Called pt schedule appt. No answer left message  ----- Message from Jessica Leahy sent at 1/10/2018  9:29 AM CST -----  Contact: Self [phone]  PT was told someone would call to schedule an appointment with a nurse.

## 2018-01-10 NOTE — PATIENT INSTRUCTIONS
Salt restriction of 1,500mg a day. Weigh yourself daily. Take an extra 1/2 tablet of the torsemide, if weight increases 2 or more pounds in a 24 hr period, or 3-4 pounds over a 3-4 day period. Instructed patient to contact our office if weight does not return to baseline in 1-2 days.    Increase cardiovascular exercise to 30 minutes of brisk walking a day for 4-5 days a week.  You can use a stationary bike or swim for 30 minutes a day instead of walking.  Whatever exercise you choose, make sure you are working hard enough to increase your heart rate.     Start taking the clonidine 0.1mg two times a day.    Please email or call in 1 week with blood pressure readings and to let us know how you are tolerating the clonidine.    Return to clinic in 4-6 weeks with blood draw

## 2018-01-11 ENCOUNTER — TELEPHONE (OUTPATIENT)
Dept: NEPHROLOGY | Facility: CLINIC | Age: 73
End: 2018-01-11

## 2018-01-11 NOTE — TELEPHONE ENCOUNTER
Called to make pt BP appt    ----- Message from Jessica Leahy sent at 1/10/2018  2:50 PM CST -----  Contact: Self [phone]  PT returned your call.

## 2018-01-12 ENCOUNTER — PATIENT MESSAGE (OUTPATIENT)
Dept: CARDIOLOGY | Facility: CLINIC | Age: 73
End: 2018-01-12

## 2018-01-12 ENCOUNTER — TELEPHONE (OUTPATIENT)
Dept: CARDIOLOGY | Facility: CLINIC | Age: 73
End: 2018-01-12

## 2018-01-12 ENCOUNTER — PATIENT MESSAGE (OUTPATIENT)
Dept: FAMILY MEDICINE | Facility: CLINIC | Age: 73
End: 2018-01-12

## 2018-01-12 NOTE — TELEPHONE ENCOUNTER
----- Message from Jolie العلي sent at 1/12/2018 10:54 AM CST -----  Contact: pt   Pt called because she is taking Clonidine and she has not taken it this morning and she has been monitoring her BP readings.  She states @ 6:30 this morning 117/53, at 8:00 148/74, and 8:45 170/71, and right after it was 175/78.  She wants to know if these are the best readings for her and if she needs to take the medication. She has not taken the Clonidine and she has taken the hydrALAZINE (APRESOLINE) 50 MG tablet and the carvedilol (COREG) 12.5 MG tablet.  She is a pt of Beatriz Hartmann and LOV 1/10/18.  She can be reached @ [phone].  Thanks!!

## 2018-01-12 NOTE — TELEPHONE ENCOUNTER
Spoke with the pt and gave her the message from Ms Shahbaz from [facility] today - the pt verbalized understanding.

## 2018-01-13 ENCOUNTER — PATIENT MESSAGE (OUTPATIENT)
Dept: FAMILY MEDICINE | Facility: CLINIC | Age: 73
End: 2018-01-13

## 2018-01-14 ENCOUNTER — NURSE TRIAGE (OUTPATIENT)
Dept: ADMINISTRATIVE | Facility: CLINIC | Age: 73
End: 2018-01-14

## 2018-01-14 ENCOUNTER — HOSPITAL ENCOUNTER (INPATIENT)
Facility: HOSPITAL | Age: 73
LOS: 12 days | Discharge: HOME HEALTH | DRG: 304 | End: 2018-01-26
Attending: EMERGENCY MEDICINE | Admitting: INTERNAL MEDICINE
Payer: MEDICARE

## 2018-01-14 DIAGNOSIS — I16.1 HYPERTENSIVE EMERGENCY: ICD-10-CM

## 2018-01-14 DIAGNOSIS — I10 ESSENTIAL HYPERTENSION: Chronic | ICD-10-CM

## 2018-01-14 DIAGNOSIS — I27.81 COR PULMONALE, CHRONIC: ICD-10-CM

## 2018-01-14 DIAGNOSIS — I25.84 CORONARY ARTERY DISEASE DUE TO CALCIFIED CORONARY LESION: ICD-10-CM

## 2018-01-14 DIAGNOSIS — N18.30 STAGE 3 CHRONIC KIDNEY DISEASE: ICD-10-CM

## 2018-01-14 DIAGNOSIS — D63.1 ANEMIA ASSOCIATED WITH CHRONIC RENAL FAILURE: ICD-10-CM

## 2018-01-14 DIAGNOSIS — N17.9 AKI (ACUTE KIDNEY INJURY): ICD-10-CM

## 2018-01-14 DIAGNOSIS — I25.10 CORONARY ARTERY DISEASE DUE TO CALCIFIED CORONARY LESION: ICD-10-CM

## 2018-01-14 DIAGNOSIS — I35.0 NONRHEUMATIC AORTIC VALVE STENOSIS: ICD-10-CM

## 2018-01-14 DIAGNOSIS — I15.8 OTHER SECONDARY HYPERTENSION: Primary | ICD-10-CM

## 2018-01-14 DIAGNOSIS — E26.9 HIGH ALDOSTERONE: ICD-10-CM

## 2018-01-14 DIAGNOSIS — R42 DIZZY: ICD-10-CM

## 2018-01-14 DIAGNOSIS — M32.8 OTHER FORMS OF SYSTEMIC LUPUS ERYTHEMATOSUS, UNSPECIFIED ORGAN INVOLVEMENT STATUS: ICD-10-CM

## 2018-01-14 DIAGNOSIS — N18.4 CHRONIC KIDNEY DISEASE, STAGE IV (SEVERE): ICD-10-CM

## 2018-01-14 DIAGNOSIS — N18.9 ANEMIA ASSOCIATED WITH CHRONIC RENAL FAILURE: ICD-10-CM

## 2018-01-14 DIAGNOSIS — E78.2 MIXED HYPERLIPIDEMIA: ICD-10-CM

## 2018-01-14 DIAGNOSIS — I50.33 ACUTE ON CHRONIC DIASTOLIC HEART FAILURE: ICD-10-CM

## 2018-01-14 LAB
ALBUMIN SERPL BCP-MCNC: 3.1 G/DL
ALP SERPL-CCNC: 84 U/L
ALT SERPL W/O P-5'-P-CCNC: 25 U/L
ANION GAP SERPL CALC-SCNC: 9 MMOL/L
AST SERPL-CCNC: 55 U/L
BACTERIA #/AREA URNS AUTO: NORMAL /HPF
BASOPHILS # BLD AUTO: 0.02 K/UL
BASOPHILS NFR BLD: 0.5 %
BILIRUB SERPL-MCNC: 0.3 MG/DL
BILIRUB UR QL STRIP: NEGATIVE
BUN SERPL-MCNC: 47 MG/DL
CALCIUM SERPL-MCNC: 9 MG/DL
CHLORIDE SERPL-SCNC: 104 MMOL/L
CLARITY UR REFRACT.AUTO: CLEAR
CO2 SERPL-SCNC: 24 MMOL/L
COLOR UR AUTO: YELLOW
CREAT SERPL-MCNC: 2.1 MG/DL
DIFFERENTIAL METHOD: ABNORMAL
EOSINOPHIL # BLD AUTO: 0.2 K/UL
EOSINOPHIL NFR BLD: 3.9 %
ERYTHROCYTE [DISTWIDTH] IN BLOOD BY AUTOMATED COUNT: 14.3 %
EST. GFR  (AFRICAN AMERICAN): 26.5 ML/MIN/1.73 M^2
EST. GFR  (NON AFRICAN AMERICAN): 23 ML/MIN/1.73 M^2
GLUCOSE SERPL-MCNC: 72 MG/DL
GLUCOSE UR QL STRIP: NEGATIVE
HCT VFR BLD AUTO: 32.3 %
HGB BLD-MCNC: 10.1 G/DL
HGB UR QL STRIP: NEGATIVE
HYALINE CASTS UR QL AUTO: 1 /LPF
IMM GRANULOCYTES # BLD AUTO: 0.01 K/UL
IMM GRANULOCYTES NFR BLD AUTO: 0.3 %
KETONES UR QL STRIP: NEGATIVE
LEUKOCYTE ESTERASE UR QL STRIP: NEGATIVE
LYMPHOCYTES # BLD AUTO: 1 K/UL
LYMPHOCYTES NFR BLD: 26.4 %
MAGNESIUM SERPL-MCNC: 1.8 MG/DL
MCH RBC QN AUTO: 25.7 PG
MCHC RBC AUTO-ENTMCNC: 31.3 G/DL
MCV RBC AUTO: 82 FL
MICROSCOPIC COMMENT: NORMAL
MONOCYTES # BLD AUTO: 0.5 K/UL
MONOCYTES NFR BLD: 13.6 %
NEUTROPHILS # BLD AUTO: 2.1 K/UL
NEUTROPHILS NFR BLD: 55.3 %
NITRITE UR QL STRIP: NEGATIVE
NRBC BLD-RTO: 0 /100 WBC
PH UR STRIP: 5 [PH] (ref 5–8)
PLATELET # BLD AUTO: 171 K/UL
PMV BLD AUTO: 10.1 FL
POTASSIUM SERPL-SCNC: 4.4 MMOL/L
PROT SERPL-MCNC: 7.7 G/DL
PROT UR QL STRIP: ABNORMAL
RBC # BLD AUTO: 3.93 M/UL
RBC #/AREA URNS AUTO: 0 /HPF (ref 0–4)
SODIUM SERPL-SCNC: 137 MMOL/L
SP GR UR STRIP: 1.01 (ref 1–1.03)
SQUAMOUS #/AREA URNS AUTO: 1 /HPF
TSH SERPL DL<=0.005 MIU/L-ACNC: 1.5 UIU/ML
URN SPEC COLLECT METH UR: ABNORMAL
UROBILINOGEN UR STRIP-ACNC: NEGATIVE EU/DL
WBC # BLD AUTO: 3.82 K/UL
WBC #/AREA URNS AUTO: 1 /HPF (ref 0–5)

## 2018-01-14 PROCEDURE — 80053 COMPREHEN METABOLIC PANEL: CPT

## 2018-01-14 PROCEDURE — 25000003 PHARM REV CODE 250: Performed by: NURSE PRACTITIONER

## 2018-01-14 PROCEDURE — 99223 1ST HOSP IP/OBS HIGH 75: CPT | Mod: GC,,, | Performed by: INTERNAL MEDICINE

## 2018-01-14 PROCEDURE — 83735 ASSAY OF MAGNESIUM: CPT

## 2018-01-14 PROCEDURE — 25000003 PHARM REV CODE 250: Performed by: STUDENT IN AN ORGANIZED HEALTH CARE EDUCATION/TRAINING PROGRAM

## 2018-01-14 PROCEDURE — 93010 ELECTROCARDIOGRAM REPORT: CPT | Mod: ,,, | Performed by: INTERNAL MEDICINE

## 2018-01-14 PROCEDURE — 96366 THER/PROPH/DIAG IV INF ADDON: CPT

## 2018-01-14 PROCEDURE — 11000001 HC ACUTE MED/SURG PRIVATE ROOM

## 2018-01-14 PROCEDURE — 99285 EMERGENCY DEPT VISIT HI MDM: CPT | Mod: 25

## 2018-01-14 PROCEDURE — 84443 ASSAY THYROID STIM HORMONE: CPT

## 2018-01-14 PROCEDURE — 63600175 PHARM REV CODE 636 W HCPCS: Performed by: STUDENT IN AN ORGANIZED HEALTH CARE EDUCATION/TRAINING PROGRAM

## 2018-01-14 PROCEDURE — 25000003 PHARM REV CODE 250: Performed by: EMERGENCY MEDICINE

## 2018-01-14 PROCEDURE — 85025 COMPLETE CBC W/AUTO DIFF WBC: CPT

## 2018-01-14 PROCEDURE — 96372 THER/PROPH/DIAG INJ SC/IM: CPT

## 2018-01-14 PROCEDURE — 81001 URINALYSIS AUTO W/SCOPE: CPT

## 2018-01-14 PROCEDURE — 96365 THER/PROPH/DIAG IV INF INIT: CPT

## 2018-01-14 RX ORDER — PANTOPRAZOLE SODIUM 40 MG/1
40 TABLET, DELAYED RELEASE ORAL DAILY
Status: DISCONTINUED | OUTPATIENT
Start: 2018-01-14 | End: 2018-01-26 | Stop reason: HOSPADM

## 2018-01-14 RX ORDER — CARVEDILOL 12.5 MG/1
12.5 TABLET ORAL 2 TIMES DAILY WITH MEALS
Status: DISCONTINUED | OUTPATIENT
Start: 2018-01-14 | End: 2018-01-17

## 2018-01-14 RX ORDER — HYDROXYCHLOROQUINE SULFATE 200 MG/1
200 TABLET, FILM COATED ORAL 2 TIMES DAILY
Status: DISCONTINUED | OUTPATIENT
Start: 2018-01-14 | End: 2018-01-26 | Stop reason: HOSPADM

## 2018-01-14 RX ORDER — ONDANSETRON 8 MG/1
8 TABLET, ORALLY DISINTEGRATING ORAL EVERY 8 HOURS PRN
Status: DISCONTINUED | OUTPATIENT
Start: 2018-01-14 | End: 2018-01-26 | Stop reason: HOSPADM

## 2018-01-14 RX ORDER — CARVEDILOL 12.5 MG/1
12.5 TABLET ORAL 2 TIMES DAILY WITH MEALS
Status: DISCONTINUED | OUTPATIENT
Start: 2018-01-14 | End: 2018-01-14

## 2018-01-14 RX ORDER — ROSUVASTATIN CALCIUM 20 MG/1
40 TABLET, COATED ORAL NIGHTLY
Status: DISCONTINUED | OUTPATIENT
Start: 2018-01-14 | End: 2018-01-26 | Stop reason: HOSPADM

## 2018-01-14 RX ORDER — HYDRALAZINE HYDROCHLORIDE 25 MG/1
50 TABLET, FILM COATED ORAL EVERY 12 HOURS
Status: DISCONTINUED | OUTPATIENT
Start: 2018-01-14 | End: 2018-01-14

## 2018-01-14 RX ORDER — IRBESARTAN 300 MG/1
300 TABLET ORAL NIGHTLY
Status: DISCONTINUED | OUTPATIENT
Start: 2018-01-14 | End: 2018-01-24

## 2018-01-14 RX ORDER — SODIUM CHLORIDE 0.9 % (FLUSH) 0.9 %
3 SYRINGE (ML) INJECTION
Status: DISCONTINUED | OUTPATIENT
Start: 2018-01-14 | End: 2018-01-26 | Stop reason: HOSPADM

## 2018-01-14 RX ORDER — LEVOTHYROXINE SODIUM 75 UG/1
75 TABLET ORAL
Status: DISCONTINUED | OUTPATIENT
Start: 2018-01-15 | End: 2018-01-26 | Stop reason: HOSPADM

## 2018-01-14 RX ORDER — CITALOPRAM 20 MG/1
20 TABLET, FILM COATED ORAL DAILY
Status: DISCONTINUED | OUTPATIENT
Start: 2018-01-14 | End: 2018-01-26 | Stop reason: HOSPADM

## 2018-01-14 RX ORDER — PROMETHAZINE HYDROCHLORIDE 25 MG/1
25 TABLET ORAL EVERY 6 HOURS PRN
Status: DISCONTINUED | OUTPATIENT
Start: 2018-01-14 | End: 2018-01-26 | Stop reason: HOSPADM

## 2018-01-14 RX ORDER — TORSEMIDE 20 MG/1
20 TABLET ORAL DAILY
Status: DISCONTINUED | OUTPATIENT
Start: 2018-01-14 | End: 2018-01-15

## 2018-01-14 RX ORDER — IBUPROFEN 200 MG
24 TABLET ORAL
Status: DISCONTINUED | OUTPATIENT
Start: 2018-01-14 | End: 2018-01-26 | Stop reason: HOSPADM

## 2018-01-14 RX ORDER — HYDRALAZINE HYDROCHLORIDE 25 MG/1
25 TABLET, FILM COATED ORAL EVERY 12 HOURS
Status: DISCONTINUED | OUTPATIENT
Start: 2018-01-14 | End: 2018-01-15

## 2018-01-14 RX ORDER — NICARDIPINE HYDROCHLORIDE 0.2 MG/ML
2.5 INJECTION INTRAVENOUS CONTINUOUS
Status: DISCONTINUED | OUTPATIENT
Start: 2018-01-14 | End: 2018-01-14

## 2018-01-14 RX ORDER — ENOXAPARIN SODIUM 100 MG/ML
40 INJECTION SUBCUTANEOUS EVERY 24 HOURS
Status: DISCONTINUED | OUTPATIENT
Start: 2018-01-14 | End: 2018-01-15

## 2018-01-14 RX ORDER — GLUCAGON 1 MG
1 KIT INJECTION
Status: DISCONTINUED | OUTPATIENT
Start: 2018-01-14 | End: 2018-01-26 | Stop reason: HOSPADM

## 2018-01-14 RX ORDER — IBUPROFEN 200 MG
16 TABLET ORAL
Status: DISCONTINUED | OUTPATIENT
Start: 2018-01-14 | End: 2018-01-26 | Stop reason: HOSPADM

## 2018-01-14 RX ADMIN — HYDROXYCHLOROQUINE SULFATE 200 MG: 200 TABLET, FILM COATED ORAL at 11:01

## 2018-01-14 RX ADMIN — TORSEMIDE 20 MG: 20 TABLET ORAL at 09:01

## 2018-01-14 RX ADMIN — HYDRALAZINE HYDROCHLORIDE 25 MG: 25 TABLET, FILM COATED ORAL at 08:01

## 2018-01-14 RX ADMIN — NICARDIPINE HYDROCHLORIDE 2.5 MG/HR: 0.2 INJECTION, SOLUTION INTRAVENOUS at 07:01

## 2018-01-14 RX ADMIN — CITALOPRAM HYDROBROMIDE 20 MG: 20 TABLET ORAL at 09:01

## 2018-01-14 RX ADMIN — IRBESARTAN 300 MG: 300 TABLET ORAL at 08:01

## 2018-01-14 RX ADMIN — CARVEDILOL 12.5 MG: 12.5 TABLET, FILM COATED ORAL at 02:01

## 2018-01-14 RX ADMIN — HYDROXYCHLOROQUINE SULFATE 200 MG: 200 TABLET, FILM COATED ORAL at 08:01

## 2018-01-14 RX ADMIN — ROSUVASTATIN CALCIUM 40 MG: 20 TABLET, FILM COATED ORAL at 08:01

## 2018-01-14 RX ADMIN — ENOXAPARIN SODIUM 40 MG: 100 INJECTION SUBCUTANEOUS at 05:01

## 2018-01-14 RX ADMIN — PANTOPRAZOLE SODIUM 40 MG: 40 TABLET, DELAYED RELEASE ORAL at 09:01

## 2018-01-14 NOTE — ED PROVIDER NOTES
Encounter Date: 1/14/2018    SCRIBE #1 NOTE: I, Alexandr Rosario, am scribing for, and in the presence of,  Yessi Singh MD. I have scribed the following portions of the note - Other sections scribed: HPI, ROS, PE.       History     Chief Complaint   Patient presents with    Dizziness     Pt reports she started Clonidine a few days ago and since then has been very dizziness, called the on-call nurse who told her she can't just quit taking it so she came here. Took a meclazine with no relief.      Time seen by provider: 5:23 AM    This is a 72 y.o. female with co-morbidities including CHF, coronary artery disease, hyperlipidemia, hypertension, chronic kidney disease, who presents to the Emergency Department with complaint of dizziness x 3 days. Patient describes her dizziness as vertigo. Patient also endorses headache and nausea for the last 2 days. She reports no improvement after taking a dose of meclizine yesterday. She states that she was just started on Clonidine on 1/10/18. Patient reports waking up this morning to use the restroom and her dizziness was worse. She was told by the on-call nurse that she was unable to modify her dosing of Clonidine and was recommended to come to the ER. On my evaluation, patient is noted with high blood pressure. Patient states that just before she left her house this morning to come to the ER that her blood pressure was in the 160s systolic. Patient mentions that she was seen 8 days ago in Urgent Care for hypertension. Patient denies any chest pain, SOB, headache at this time, recent fall or head injury, sudden vision changes, vomiting, dysuria, increased urination, sudden onset of abdominal pain or back pain. Patient reports compliance with all her blood pressure medications. She states that she normally takes her blood pressure medications at around 8 AM.       The history is provided by the patient and medical records.     Review of patient's allergies indicates:   Allergen  Reactions    Ace inhibitors      Other reaction(s): Lips Swelling    Vioxx [rofecoxib]      Other reaction(s): lips swelling    Bactrim [sulfamethoxazole-trimethoprim]      Pt would like this medication to be added due to elevation of creatinine with single kidney.    Arthrotec 50 [diclofenac-misoprostol]      Other reaction(s): Unknown    Bentyl [dicyclomine]      Not effective    Doxycycline      nausea    Imdur [isosorbide mononitrate] Nausea Only    Lomotil [diphenoxylate-atropine]      Stomach cramps, pt vomitted    Lozol [indapamide] Rash    Tramadol Nausea Only     Past Medical History:   Diagnosis Date    Acute on chronic diastolic congestive heart failure 11/17/2017    Allergy     Anatomical narrow angle glaucoma     Anemia     States diagnosed about a month ago    Aortic atherosclerosis     Arthritis     Cataract     CHF (congestive heart failure)     Chronic kidney disease     Chronic sciatica of left side     Right lower back pain due to sciatica    Coronary artery disease     Depression     Dry eyes     GERD (gastroesophageal reflux disease)     History of colon cancer     Hyperlipidemia     Hypertension     Hypothyroidism     Left ventricular diastolic dysfunction with preserved systolic function     Lupus (systemic lupus erythematosus) 8/17/2012    Malnutrition 5/16/2017    Osteoarthritis of cervical spine 8/17/2012    Osteopenia     PAD (peripheral artery disease)      Past Surgical History:   Procedure Laterality Date    CARDIAC CATHETERIZATION  07/27/2011    x1    CHOLECYSTECTOMY  1999    COLON SURGERY  2000 & 2003    partial removal    COLONOSCOPY N/A 4/14/2016    Procedure: COLONOSCOPY;  Surgeon: Jose Steen MD;  Location: [address]);  Service: Endoscopy;  Laterality: N/A;  prep 2 days prior light meals only/clear liquid day before  and 4 dulcolax tabs (5 mgs) at noon    COLONOSCOPY N/A 9/14/2017    Procedure: COLONOSCOPY;  Surgeon: Jose TERRELL  MD Enio;  Location: [facility] (4TH FLR);  Service: Endoscopy;  Laterality: N/A;    EYE SURGERY      HAND SURGERY Bilateral 1996 & 1997    due to carpal tunnel     HERNIA REPAIR      HYSTERECTOMY  1983    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    OOPHORECTOMY      removed one    Peripheral Iridotomy both eyes  1994    laser angle correction    THYROIDECTOMY, PARTIAL  2006    to remove two nodules and one-half thyroid     Family History   Problem Relation Age of Onset    Heart attack Mother     Hypertension Mother     Heart disease Father     Hypertension Father     Diabetes Maternal Grandmother     Hypertension Sister     Kidney disease Brother     Kidney failure Brother      received donated kidney from sister    No Known Problems Daughter     Diabetes Son     Hypertension Brother     Diabetes Maternal Aunt     No Known Problems Maternal Grandfather     No Known Problems Paternal Grandmother     No Known Problems Paternal Grandfather     Acne Neg Hx     Eczema Neg Hx     Lupus Neg Hx     Psoriasis Neg Hx     Melanoma Neg Hx      Social History   Substance Use Topics    Smoking status: Former Smoker     Packs/day: 1.50     Years: 30.00     Types: Cigarettes     Start date: 1955     Quit date: 3/13/1985    Smokeless tobacco: Never Used    Alcohol use No     Review of Systems   Eyes: Negative for visual disturbance.   Respiratory: Negative for shortness of breath.    Cardiovascular: Negative for chest pain.   Gastrointestinal: Positive for nausea. Negative for abdominal pain and vomiting.   Genitourinary: Negative for dysuria and frequency.   Musculoskeletal: Negative for back pain.   Neurological: Positive for dizziness.       Physical Exam     Initial Vitals [01/14/18 0456]   BP Pulse Resp Temp SpO2   (!) 166/73 (!) 55 16 98 °F (36.7 °C) 99 %      MAP       104         Physical Exam    Nursing note and vitals reviewed.  Constitutional: She appears well-developed and well-nourished.    Eyes: EOM are normal. Pupils are equal, round, and reactive to light.   No nystagmus.    Cardiovascular: Normal rate and regular rhythm. Exam reveals no gallop and no friction rub.    No murmur heard.  Pulmonary/Chest: Breath sounds normal. No respiratory distress. She has no wheezes. She has no rhonchi. She has no rales. She exhibits no tenderness.   Abdominal: Soft. She exhibits no distension. There is no tenderness.   Musculoskeletal:   No pitting edema to bilateral lower extremities.    Neurological: She is alert and oriented to person, place, and time. No cranial nerve deficit or sensory deficit.         ED Course   Procedures  Labs Reviewed   URINALYSIS, REFLEX TO URINE CULTURE - Abnormal; Notable for the following:        Result Value    Protein, UA 2+ (*)     All other components within normal limits    Narrative:     Preferred Collection Type->Urine, Clean Catch   CBC W/ AUTO DIFFERENTIAL - Abnormal; Notable for the following:     WBC 3.82 (*)     RBC 3.93 (*)     Hemoglobin 10.1 (*)     Hematocrit 32.3 (*)     MCH 25.7 (*)     MCHC 31.3 (*)     All other components within normal limits   COMPREHENSIVE METABOLIC PANEL - Abnormal; Notable for the following:     BUN, Bld 47 (*)     Creatinine 2.1 (*)     Albumin 3.1 (*)     AST 55 (*)     eGFR if  26.5 (*)     eGFR if non  23.0 (*)     All other components within normal limits   MAGNESIUM   URINALYSIS MICROSCOPIC    Narrative:     Preferred Collection Type->Urine, Clean Catch   TSH             Medical Decision Making:   History:   Old Medical Records: I decided to obtain old medical records.  Differential Diagnosis:   My initial differential diagnoses include but are not limited to hypertensive emergency, ICH, benign positional vertigo, hypotension.   Head ct is negative for acute bleed. I doubt BPV as this patient has no nystagmus nausea or vomiting. Pt hasn't been hypotensive here at all - in fact was remarkably  hypertensive on many repeated measurements. As she was bradycardic - I chose nicardipine drip - consulted critical care who saw pt bedside. ekg sinus bradycardia - no acute ischemic changes  Admitted in stable condition     Clinical Tests:   Lab Tests: Ordered and Reviewed  Radiological Study: Ordered and Reviewed  Medical Tests: Ordered and Reviewed            Scribe Attestation:   Scribe #1: I performed the above scribed service and the documentation accurately describes the services I performed. I attest to the accuracy of the note.    I, Dr. Yessi Singh, personally performed the services described in this documentation. All medical record entries made by the scribe were at my direction and in my presence.  I have reviewed the chart and agree that the record reflects my personal performance and is accurate and complete. Yessi Singh MD.  9:08 AM 01/14/2018        ED Course      Clinical Impression:   The encounter diagnosis was Dizzy.                           Yessi Singh MD  01/14/18 0911

## 2018-01-14 NOTE — ASSESSMENT & PLAN NOTE
-patient has known renovascular HTN   - SBP persistently elevated 180s at baseline based on notes  -Looking at pts home logs, appears to run from 103-187 (but mostly around 180s)  - currently takes irbesartan, coreg 12.5, hydralazine 20 BID, and clonidine was recently added in clinic on 1/10/18  -currently in the ED on a cardene ggt  -will continue GGT, monitor BP and symptoms closely  -inpatient consult to cardiology placed for better long term management of BP

## 2018-01-14 NOTE — TELEPHONE ENCOUNTER
Reason for Disposition   [1] Dizziness (vertigo) present now AND [2] age > 60  (Exception: prior physician evaluation for this AND no different/worse than usual)    Protocols used: ST DIZZINESS - VERTIGO-A-AH    Patient states she recently started clonidine and now she has vertigo and feels very nauseated. Most recent v/s ar /49 and heart rate of 62. Patient was advised to go to the ED now for an evaluation. She verbalized understanding.

## 2018-01-14 NOTE — ASSESSMENT & PLAN NOTE
-CAD with KIT to LAD  - Home ASA 81  - Home Plavix 75  - will hold irbesartan Coreg hydralazine and clonidine while on the cardene ggt  - home rosuva 40

## 2018-01-14 NOTE — ASSESSMENT & PLAN NOTE
"-pt had a recent BMBx with Dr. EVA Pham  -as per Dr. Pham's note "She has plasma cells noted but not meeting criteria for multiple myeloma"  -Leukemia/Lymphoma flow negative; Normal cytogenetics  -Labs support diagnosis of anemia chronic disease  -will continue to monitor patients H/h  "

## 2018-01-14 NOTE — SUBJECTIVE & OBJECTIVE
Past Medical History:   Diagnosis Date    Acute on chronic diastolic congestive heart failure 11/17/2017    Allergy     Anatomical narrow angle glaucoma     Anemia     States diagnosed about a month ago    Aortic atherosclerosis     Arthritis     Cataract     CHF (congestive heart failure)     Chronic kidney disease     Chronic sciatica of left side     Right lower back pain due to sciatica    Coronary artery disease     Depression     Dry eyes     GERD (gastroesophageal reflux disease)     History of colon cancer     Hyperlipidemia     Hypertension     Hypothyroidism     Left ventricular diastolic dysfunction with preserved systolic function     Lupus (systemic lupus erythematosus) 8/17/2012    Malnutrition 5/16/2017    Osteoarthritis of cervical spine 8/17/2012    Osteopenia     PAD (peripheral artery disease)        Past Surgical History:   Procedure Laterality Date    CARDIAC CATHETERIZATION  07/27/2011    x1    CHOLECYSTECTOMY  1999    COLON SURGERY  2000 & 2003    partial removal    COLONOSCOPY N/A 4/14/2016    Procedure: COLONOSCOPY;  Surgeon: Jose Steen MD;  Location: [facility] ([address] [place] [address]);  Service: Endoscopy;  Laterality: N/A;  prep 2 days prior light meals only/clear liquid day before  and 4 dulcolax tabs (5 mgs) at noon    COLONOSCOPY N/A 9/14/2017    Procedure: COLONOSCOPY;  Surgeon: Jose Steen MD;  Location: [facility] ([address] [place] [address]);  Service: Endoscopy;  Laterality: N/A;    EYE SURGERY      HAND SURGERY Bilateral 1996 & 1997    due to carpal tunnel     HERNIA REPAIR      HYSTERECTOMY  1983    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    Arthrotec 50 [diclofenac-misoprostol]      Other reaction(s): Unknown    Bentyl [dicyclomine]      Not effective    Doxycycline      nausea    Imdur [isosorbide mononitrate] Nausea Only    Lomotil [diphenoxylate-atropine]      Stomach cramps, pt vomitted    Lozol [indapamide] Rash    Tramadol Nausea Only       Family History     Problem Relation (Age of Onset)    Diabetes Maternal Grandmother, Son, Maternal Aunt    Heart attack Mother    Heart disease Father    Hypertension Mother, Father, Sister, Brother    Kidney disease Brother    Kidney failure Brother    No Known Problems Daughter, Maternal Grandfather, Paternal Grandmother, Paternal Grandfather        Social History Main Topics    Smoking status: Former Smoker     Packs/day: 1.50     Years: 30.00     Types: Cigarettes     Start date: 1955     Quit date: 3/13/1985    Smokeless tobacco: Never Used    Alcohol use No    Drug use: No    Sexual activity: Not Currently     Partners: Male     Birth control/ protection: Abstinence      Review of Systems   Constitutional: Negative for activity change, appetite change, chills, fatigue, fever and unexpected weight change.   HENT: Positive for congestion. Negative for ear discharge and rhinorrhea.    Eyes: Negative for photophobia, redness and visual disturbance.   Respiratory: Negative for apnea, cough, choking, shortness of breath and wheezing.    Cardiovascular: Negative for chest pain, palpitations and leg swelling.   Gastrointestinal: Positive for vomiting. Negative for abdominal distention, abdominal pain, constipation and nausea.   Endocrine: Negative for polydipsia, polyphagia and polyuria.   Genitourinary: Negative for dysuria, frequency and hematuria.   Musculoskeletal: Negative for arthralgias, back pain, gait problem, joint  swelling and myalgias.   Skin: Negative for color change, pallor, rash and wound.   Allergic/Immunologic: Negative for immunocompromised state.   Neurological: Positive for dizziness and headaches. Negative for seizures, speech difficulty, weakness and numbness.   Hematological: Negative for adenopathy. Does not bruise/bleed easily.   Psychiatric/Behavioral: Negative for agitation, behavioral problems, confusion and decreased concentration.   All other systems reviewed and are negative.    Objective:     Vital Signs (Most Recent):  Temp: 98 °F (36.7 °C) (01/14/18 0456)  Pulse: (!) 56 (01/14/18 0737)  Resp: 16 (01/14/18 0737)  BP: (!) 175/77 (01/14/18 0737)  SpO2: 97 % (01/14/18 0737) Vital Signs (24h Range):  Temp:  [98 °F (36.7 °C)] 98 °F (36.7 °C)  Pulse:  [52-58] 56  Resp:  [11-16] 16  SpO2:  [97 %-100 %] 97 %  BP: (166-238)/() 175/77   Weight: 51.7 kg (114 lb)  Body mass index is 18.97 kg/m².    No intake or output data in the 24 hours ending 01/14/18 0744    Physical Exam   Constitutional: She is oriented to person, place, and time. She appears well-developed and well-nourished. No distress.   HENT:   Head: Normocephalic and atraumatic.   Eyes: EOM are normal. Pupils are equal, round, and reactive to light. Right eye exhibits no discharge. Left eye exhibits no discharge.   Neck: Normal range of motion. Neck supple. No JVD present.   Cardiovascular: Normal rate, regular rhythm and normal heart sounds.  Exam reveals no gallop and no friction rub.    No murmur heard.  175/80   Pulmonary/Chest: Effort normal and breath sounds normal. No respiratory distress. She has no wheezes.   Abdominal: Soft. Bowel sounds are normal. She exhibits no distension and no mass. There is no tenderness. There is no guarding.   Musculoskeletal: Normal range of motion. She exhibits no edema or deformity.   Neurological: She is alert and oriented to person, place, and time.   Skin: Skin is warm and dry. She is not diaphoretic. No  erythema. No pallor.   Psychiatric: She has a normal mood and affect. Her behavior is normal.   Nursing note and vitals reviewed.      Vents:     Lines/Drains/Airways     Peripheral Intravenous Line                 Peripheral IV - Single Lumen 01/14/18 0701 Left Hand less than 1 day              Significant Labs:    CBC/Anemia Profile:    Recent Labs  Lab 01/14/18  0700   WBC 3.82*   HGB 10.1*   HCT 32.3*      MCV 82   RDW 14.3        Chemistries:    Recent Labs  Lab 01/14/18  0700      K 4.4      CO2 24   BUN 47*   CREATININE 2.1*   CALCIUM 9.0   ALBUMIN 3.1*   PROT 7.7   BILITOT 0.3   ALKPHOS 84   ALT 25   AST 55*   MG 1.8              Blood Culture: No results for input(s): LABBLOO in the last 48 hours.  Lactic Acid: No results for input(s): LACTATE in the last 48 hours.    Significant Imaging: I have reviewed all pertinent imaging results/findings within the past 24 hours.     1.  No CT evidence of acute intracranial abnormality. Clinical correlation and further evaluation as warrented.     2.  Mild generalized cerebral volume loss and microangiopathic change, similar to prior examination

## 2018-01-14 NOTE — ED NOTES
"Pt states "I started to taking clonidine and yesterday I started feeling dizzy and nauseated with a headache". Pt denies vomiting, chest pain, SOB, fever, cough. Pt c/o dry mouth. Pt denies headache currently.   "

## 2018-01-14 NOTE — ED NOTES
Talking on the phone. Informed that she will be admitted AAO.Feels dizzy. Maintained on cardiac monitor

## 2018-01-14 NOTE — HPI
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  Pts BP was found to be >230 and she was placed on a cardene drip, necessitating ICU admission.     Patient was prescribed clonidine (Rx by Cardiology) on 1/10/18 as she was having high blood pressure.  Pt took the first dose of clonidine 1/10/18.  She began having dizziness, vertigo, headache and nausea for the last 2 days. Pt describes the headache as constant located in her occipital region.  This concerned the patient, as it was lasting for 2 days.  She took a meclizine tablet to improve her nausea but it didn't help.  She also took a tylenol which did not relieve her pain.  She woke up with AM, got out of bed and felt "terrible, unsteady".  She called the on-call nurse that she was unable to modify her dosing of Clonidine and was recommended to come to the ER.     Patient mentions that she was seen 8 days ago in Urgent Care for hypertension. Patient denies any chest pain, SOB, headache at this time, recent fall or head injury, sudden vision changes, vomiting, dysuria, increased urination, sudden onset of abdominal pain or back pain. Patient reports compliance with all her blood pressure medications. She states that she normally takes her blood pressure medications at around 8 AM.     "

## 2018-01-14 NOTE — ASSESSMENT & PLAN NOTE
-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 Ruffin in nephro clinic  - renally-dose all meds  - avoid nephrotoxic meds

## 2018-01-14 NOTE — ASSESSMENT & PLAN NOTE
-vertigo started 1/11/18   -ddx include result of medication vs meniere's disease vs vestibular neuritis vs neurological involvement  -pt denies that the room is spinning  -this has been constant since Friday  -CT head negative  meclozine did not provide pt with much relief  -will monitor and consider consult to neurology if symptoms persist.

## 2018-01-14 NOTE — H&P
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  Pts BP was found to be >230 and she was placed on a cardene drip, necessitating ICU admission.     Patient was prescribed clonidine (Rx by Cardiology) on 1/10/18 as she was having high blood pressure.  Pt took the first dose of clonidine 1/10/18.  She began having dizziness, vertigo, headache and nausea for the last 2 days. Pt describes the headache as constant located in her occipital region.  This concerned the patient, as it was lasting for 2 days.  She took a meclizine tablet to improve her nausea but it didn't help.  She also took a tylenol which did not relieve her pain.  She woke up with AM, got out of bed and felt "terrible, unsteady".  She called the on-call nurse that she was unable to modify her dosing of Clonidine and was recommended to come to the ER.     Patient mentions that she was seen 8 days ago in Urgent Care for hypertension. Patient denies any chest pain, SOB, headache at this time, recent fall or head injury, sudden vision changes, vomiting, dysuria, increased urination, sudden onset of abdominal pain or back pain. Patient reports compliance with all her blood pressure medications. She states that she normally takes her blood pressure medications at around 8 AM.       Hospital/ICU " Course:  No notes on file     Past Medical History:   Diagnosis Date    Acute on chronic diastolic congestive heart failure 11/17/2017    Allergy     Anatomical narrow angle glaucoma     Anemia     States diagnosed about a month ago    Aortic atherosclerosis     Arthritis     Cataract     CHF (congestive heart failure)     Chronic kidney disease     Chronic sciatica of left side     Right lower back pain due to sciatica    Coronary artery disease     Depression     Dry eyes     GERD (gastroesophageal reflux disease)     History of colon cancer     Hyperlipidemia     Hypertension     Hypothyroidism     Left ventricular diastolic dysfunction with preserved systolic function     Lupus (systemic lupus erythematosus) 8/17/2012    Malnutrition 5/16/2017    Osteoarthritis of cervical spine 8/17/2012    Osteopenia     PAD (peripheral artery disease)        Past Surgical History:   Procedure Laterality Date    CARDIAC CATHETERIZATION  07/27/2011    x1    CHOLECYSTECTOMY  1999    COLON SURGERY  2000 & 2003    partial removal    COLONOSCOPY N/A 4/14/2016    Procedure: COLONOSCOPY;  Surgeon: Jose Steen MD;  Location: [facility] ([address] [place] [address]);  Service: Endoscopy;  Laterality: N/A;  prep 2 days prior light meals only/clear liquid day before  and 4 dulcolax tabs (5 mgs) at noon    COLONOSCOPY N/A 9/14/2017    Procedure: COLONOSCOPY;  Surgeon: Jose Steen MD;  Location: [facility] ([address] [place] [address]);  Service: Endoscopy;  Laterality: N/A;    EYE SURGERY      HAND SURGERY Bilateral 1996 & 1997    due to carpal tunnel     HERNIA REPAIR      HYSTERECTOMY  1983    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    Arthrotec 50 [diclofenac-misoprostol]      Other reaction(s): Unknown    Bentyl [dicyclomine]      Not effective    Doxycycline      nausea    Imdur [isosorbide mononitrate] Nausea Only    Lomotil [diphenoxylate-atropine]      Stomach cramps, pt vomitted    Lozol [indapamide] Rash    Tramadol Nausea Only       Family History     Problem Relation (Age of Onset)    Diabetes Maternal Grandmother, Son, Maternal Aunt    Heart attack Mother    Heart disease Father    Hypertension Mother, Father, Sister, Brother    Kidney disease Brother    Kidney failure Brother    No Known Problems Daughter, Maternal Grandfather, Paternal Grandmother, Paternal Grandfather        Social History Main Topics    Smoking status: Former Smoker     Packs/day: 1.50     Years: 30.00     Types: Cigarettes     Start date: 1955     Quit date: 3/13/1985    Smokeless tobacco: Never Used    Alcohol use No    Drug use: No    Sexual activity: Not Currently     Partners: Male     Birth control/ protection: Abstinence      Review of Systems   Constitutional: Negative for activity change, appetite change, chills, fatigue, fever and unexpected weight change.   HENT: Positive for congestion. Negative for ear discharge and rhinorrhea.    Eyes: Negative for photophobia, redness and visual disturbance.   Respiratory: Negative for apnea, cough, choking, shortness of breath and wheezing.    Cardiovascular: Negative for chest pain, palpitations and leg swelling.   Gastrointestinal: Positive for vomiting. Negative for abdominal distention, abdominal pain, constipation and nausea.   Endocrine: Negative for polydipsia, polyphagia and polyuria.   Genitourinary: Negative for dysuria, frequency and hematuria.   Musculoskeletal: Negative for arthralgias, back  pain, gait problem, joint swelling and myalgias.   Skin: Negative for color change, pallor, rash and wound.   Allergic/Immunologic: Negative for immunocompromised state.   Neurological: Positive for dizziness and headaches. Negative for seizures, speech difficulty, weakness and numbness.   Hematological: Negative for adenopathy. Does not bruise/bleed easily.   Psychiatric/Behavioral: Negative for agitation, behavioral problems, confusion and decreased concentration.   All other systems reviewed and are negative.    Objective:     Vital Signs (Most Recent):  Temp: 98 °F (36.7 °C) (01/14/18 0456)  Pulse: (!) 56 (01/14/18 0737)  Resp: 16 (01/14/18 0737)  BP: (!) 175/77 (01/14/18 0737)  SpO2: 97 % (01/14/18 0737) Vital Signs (24h Range):  Temp:  [98 °F (36.7 °C)] 98 °F (36.7 °C)  Pulse:  [52-58] 56  Resp:  [11-16] 16  SpO2:  [97 %-100 %] 97 %  BP: (166-238)/() 175/77   Weight: 51.7 kg (114 lb)  Body mass index is 18.97 kg/m².    No intake or output data in the 24 hours ending 01/14/18 0744    Physical Exam   Constitutional: She is oriented to person, place, and time. She appears well-developed and well-nourished. No distress.   HENT:   Head: Normocephalic and atraumatic.   Eyes: EOM are normal. Pupils are equal, round, and reactive to light. Right eye exhibits no discharge. Left eye exhibits no discharge.   Neck: Normal range of motion. Neck supple. No JVD present.   Cardiovascular: Normal rate, regular rhythm and normal heart sounds.  Exam reveals no gallop and no friction rub.    No murmur heard.  175/80   Pulmonary/Chest: Effort normal and breath sounds normal. No respiratory distress. She has no wheezes.   Abdominal: Soft. Bowel sounds are normal. She exhibits no distension and no mass. There is no tenderness. There is no guarding.   Musculoskeletal: Normal range of motion. She exhibits no edema or deformity.   Neurological: She is alert and oriented to person, place, and time.   Skin: Skin is warm and dry.  She is not diaphoretic. No erythema. No pallor.   Psychiatric: She has a normal mood and affect. Her behavior is normal.   Nursing note and vitals reviewed.      Vents:     Lines/Drains/Airways     Peripheral Intravenous Line                 Peripheral IV - Single Lumen 01/14/18 0701 Left Hand less than 1 day              Significant Labs:    CBC/Anemia Profile:    Recent Labs  Lab 01/14/18  0700   WBC 3.82*   HGB 10.1*   HCT 32.3*      MCV 82   RDW 14.3        Chemistries:    Recent Labs  Lab 01/14/18  0700      K 4.4      CO2 24   BUN 47*   CREATININE 2.1*   CALCIUM 9.0   ALBUMIN 3.1*   PROT 7.7   BILITOT 0.3   ALKPHOS 84   ALT 25   AST 55*   MG 1.8              Blood Culture: No results for input(s): LABBLOO in the last 48 hours.  Lactic Acid: No results for input(s): LACTATE in the last 48 hours.    Significant Imaging: I have reviewed all pertinent imaging results/findings within the past 24 hours.     1.  No CT evidence of acute intracranial abnormality. Clinical correlation and further evaluation as warrented.     2.  Mild generalized cerebral volume loss and microangiopathic change, similar to prior examination    Assessment/Plan:     ENT   Vertigo    -vertigo started 1/11/18   -ddx include result of medication vs meniere's disease vs vestibular neuritis vs neurological involvement  -pt denies that the room is spinning  -this has been constant since Friday  -CT head negative  meclozine did not provide pt with much relief  -will monitor and consider consult to neurology if symptoms persist.           Cardiac/Vascular   Hypertensive emergency    -patient has known renovascular HTN   - SBP persistently elevated 180s at baseline based on notes  -Looking at pts home logs, appears to run from 103-187 (but mostly around 180s)  - currently takes irbesartan, coreg 12.5, hydralazine 20 BID, and clonidine was recently added in clinic on 1/10/18  -currently in the ED on a cardene ggt  -will continue  "GGT, monitor BP and symptoms closely  -inpatient consult to cardiology placed for better long term management of BP               Hyperlipidemia    - home rosuva 40        CAD (coronary artery disease)    -CAD with KIT to LAD  - Home ASA 81  - Home Plavix 75  - will hold irbesartan Coreg hydralazine and clonidine while on the cardene ggt  - home rosuva 40        Renal/   CKD (chronic kidney disease)    -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 Ruffin in nephro clinic  - renally-dose all meds  - avoid nephrotoxic meds        Immunology/Multi System   Lupus (systemic lupus erythematosus)       - seen in Rheum clinic by Dr Ramirez  - home plaquenil 200        Oncology   Anemia associated with chronic renal failure    -pt had a recent BMBx with Dr. EVA Pham  -as per Dr. Pham's note "She has plasma cells noted but not meeting criteria for multiple myeloma"  -Leukemia/Lymphoma flow negative; Normal cytogenetics  -Labs support diagnosis of anemia chronic disease  -will continue to monitor patients H/h            Critical Care Daily Checklist:    A: Awake: RASS Goal/Actual Goal:    Actual:     B: Spontaneous Breathing Trial Performed?     C: SAT & SBT Coordinated?  Not intubated                      D: Delirium: CAM-ICU     E: Early Mobility Performed? No   F: Feeding Goal:    Status:     Current Diet Order   No orders of the defined types were placed in this encounter.      AS: Analgesia/Sedation none   T: Thromboembolic Prophylaxis lovenox   H: HOB > 300 Yes   U: Stress Ulcer Prophylaxis (if needed) no   G: Glucose Control prn   B: Bowel Function     I: Indwelling Catheter (Lines & Parsons) Necessity piv   D: De-escalation of Antimicrobials/Pharmacotherapies none    Plan for the day/ETD Stabilize BP    Code Status:  Family/Goals of Care: FULL       Critical secondary to Patient has a condition that poses threat to life and bodily function: HTN emergency     Critical care was time " spent personally by me on the following activities: development of treatment plan with patient or surrogate and bedside caregivers, discussions with consultants, evaluation of patient's response to treatment, examination of patient, ordering and performing treatments and interventions, ordering and review of laboratory studies, ordering and review of radiographic studies, pulse oximetry, re-evaluation of patient's condition. This critical care time did not overlap with that of any other provider or involve time for any procedures.     Fernanda Ann MD  Critical Care Medicine  [facility]

## 2018-01-14 NOTE — ED NOTES
Patient identifiers verified and correct for Ewelina Arnold.    LOC: The patient is awake, alert and aware of environment with an appropriate affect, the patient is oriented x 3 and speaking appropriately.  APPEARANCE: Patient resting comfortably and in no acute distress, patient is clean and well groomed, patient's clothing is properly fastened.  SKIN: The skin is warm and dry, color consistent with ethnicity, patient has normal skin turgor and moist mucus membranes, skin intact, no breakdown or bruising noted.  MUSCULOSKELETAL: Patient moving all extremities spontaneously, no obvious swelling or deformities noted.  RESPIRATORY: Airway is open and patent, respirations are spontaneous, patient has a normal effort and rate, no accessory muscle use noted  CARDIAC: Patient has a normal rate and regular rhythm, no periphreal edema noted, capillary refill < 3 seconds.  ABDOMEN: Soft and non tender to palpation, no distention noted, normoactive bowel sounds present in all four quadrants.  NEUROLOGIC: PERRL, 3mm bilaterally, eyes open spontaneously, behavior appropriate to situation, follows commands, facial expression symmetrical, bilateral hand grasp equal and even, purposeful motor response noted, normal sensation in all extremities when touched with a finger.

## 2018-01-14 NOTE — CONSULTS
Consult to ICU received.  Please see H&P for full assessment and plan.    Fernanda Ann MD  Internal Medicine, PGY2  Pager [phone]

## 2018-01-15 DIAGNOSIS — G44.209 TENSION-TYPE HEADACHE, NOT INTRACTABLE, UNSPECIFIED CHRONICITY PATTERN: ICD-10-CM

## 2018-01-15 DIAGNOSIS — R42 DIZZINESS: ICD-10-CM

## 2018-01-15 PROBLEM — I35.0 AORTIC STENOSIS: Status: ACTIVE | Noted: 2018-01-15

## 2018-01-15 PROBLEM — R51.9 HEADACHE: Status: ACTIVE | Noted: 2018-01-15

## 2018-01-15 PROBLEM — N18.4 CHRONIC KIDNEY DISEASE, STAGE IV (SEVERE): Status: ACTIVE | Noted: 2018-01-15

## 2018-01-15 PROBLEM — I27.81 COR PULMONALE, CHRONIC: Status: ACTIVE | Noted: 2018-01-15

## 2018-01-15 LAB
ALBUMIN SERPL BCP-MCNC: 2.7 G/DL
ALP SERPL-CCNC: 76 U/L
ALT SERPL W/O P-5'-P-CCNC: 32 U/L
ANION GAP SERPL CALC-SCNC: 8 MMOL/L
AST SERPL-CCNC: 57 U/L
BASOPHILS # BLD AUTO: 0.03 K/UL
BASOPHILS NFR BLD: 0.8 %
BILIRUB SERPL-MCNC: 0.2 MG/DL
BUN SERPL-MCNC: 60 MG/DL
CALCIUM SERPL-MCNC: 8.5 MG/DL
CHLORIDE SERPL-SCNC: 104 MMOL/L
CO2 SERPL-SCNC: 26 MMOL/L
CREAT SERPL-MCNC: 2.3 MG/DL
DIFFERENTIAL METHOD: ABNORMAL
EOSINOPHIL # BLD AUTO: 0.1 K/UL
EOSINOPHIL NFR BLD: 2.3 %
ERYTHROCYTE [DISTWIDTH] IN BLOOD BY AUTOMATED COUNT: 14 %
EST. GFR  (AFRICAN AMERICAN): 23.8 ML/MIN/1.73 M^2
EST. GFR  (NON AFRICAN AMERICAN): 20.6 ML/MIN/1.73 M^2
GLUCOSE SERPL-MCNC: 80 MG/DL
HCT VFR BLD AUTO: 27.9 %
HGB BLD-MCNC: 8.9 G/DL
IMM GRANULOCYTES # BLD AUTO: 0 K/UL
IMM GRANULOCYTES NFR BLD AUTO: 0 %
LYMPHOCYTES # BLD AUTO: 1.2 K/UL
LYMPHOCYTES NFR BLD: 30.4 %
MAGNESIUM SERPL-MCNC: 1.6 MG/DL
MCH RBC QN AUTO: 25.5 PG
MCHC RBC AUTO-ENTMCNC: 31.9 G/DL
MCV RBC AUTO: 80 FL
MONOCYTES # BLD AUTO: 0.8 K/UL
MONOCYTES NFR BLD: 19.2 %
NEUTROPHILS # BLD AUTO: 1.9 K/UL
NEUTROPHILS NFR BLD: 47.3 %
NRBC BLD-RTO: 0 /100 WBC
PHOSPHATE SERPL-MCNC: 4.6 MG/DL
PLATELET # BLD AUTO: 178 K/UL
PMV BLD AUTO: 11.4 FL
POTASSIUM SERPL-SCNC: 4 MMOL/L
PROT SERPL-MCNC: 6.6 G/DL
RBC # BLD AUTO: 3.49 M/UL
SODIUM SERPL-SCNC: 138 MMOL/L
WBC # BLD AUTO: 3.91 K/UL

## 2018-01-15 PROCEDURE — 97161 PT EVAL LOW COMPLEX 20 MIN: CPT

## 2018-01-15 PROCEDURE — 99233 SBSQ HOSP IP/OBS HIGH 50: CPT | Mod: ,,, | Performed by: HOSPITALIST

## 2018-01-15 PROCEDURE — 36415 COLL VENOUS BLD VENIPUNCTURE: CPT

## 2018-01-15 PROCEDURE — 97165 OT EVAL LOW COMPLEX 30 MIN: CPT

## 2018-01-15 PROCEDURE — 25000003 PHARM REV CODE 250: Performed by: NURSE PRACTITIONER

## 2018-01-15 PROCEDURE — 11000001 HC ACUTE MED/SURG PRIVATE ROOM

## 2018-01-15 PROCEDURE — 83735 ASSAY OF MAGNESIUM: CPT

## 2018-01-15 PROCEDURE — 84100 ASSAY OF PHOSPHORUS: CPT

## 2018-01-15 PROCEDURE — 25000003 PHARM REV CODE 250: Performed by: STUDENT IN AN ORGANIZED HEALTH CARE EDUCATION/TRAINING PROGRAM

## 2018-01-15 PROCEDURE — 80053 COMPREHEN METABOLIC PANEL: CPT

## 2018-01-15 PROCEDURE — 85025 COMPLETE CBC W/AUTO DIFF WBC: CPT

## 2018-01-15 PROCEDURE — 25000003 PHARM REV CODE 250: Performed by: HOSPITALIST

## 2018-01-15 PROCEDURE — 63600175 PHARM REV CODE 636 W HCPCS: Performed by: STUDENT IN AN ORGANIZED HEALTH CARE EDUCATION/TRAINING PROGRAM

## 2018-01-15 RX ORDER — HYDRALAZINE HYDROCHLORIDE 50 MG/1
50 TABLET, FILM COATED ORAL EVERY 8 HOURS
Status: DISCONTINUED | OUTPATIENT
Start: 2018-01-15 | End: 2018-01-16

## 2018-01-15 RX ORDER — LABETALOL HYDROCHLORIDE 5 MG/ML
10 INJECTION, SOLUTION INTRAVENOUS EVERY 6 HOURS PRN
Status: DISCONTINUED | OUTPATIENT
Start: 2018-01-15 | End: 2018-01-26 | Stop reason: HOSPADM

## 2018-01-15 RX ORDER — LABETALOL HYDROCHLORIDE 5 MG/ML
10 INJECTION, SOLUTION INTRAVENOUS ONCE
Status: DISCONTINUED | OUTPATIENT
Start: 2018-01-15 | End: 2018-01-26 | Stop reason: HOSPADM

## 2018-01-15 RX ORDER — HYDRALAZINE HYDROCHLORIDE 25 MG/1
25 TABLET, FILM COATED ORAL EVERY 8 HOURS
Status: DISCONTINUED | OUTPATIENT
Start: 2018-01-15 | End: 2018-01-15

## 2018-01-15 RX ORDER — MECLIZINE HCL 12.5 MG 12.5 MG/1
12.5 TABLET ORAL 3 TIMES DAILY PRN
Qty: 90 TABLET | Refills: 1 | Status: SHIPPED | OUTPATIENT
Start: 2018-01-15 | End: 2018-01-29 | Stop reason: SDUPTHER

## 2018-01-15 RX ORDER — CHOLESTYRAMINE 4 G/9G
1 POWDER, FOR SUSPENSION ORAL
Status: DISCONTINUED | OUTPATIENT
Start: 2018-01-15 | End: 2018-01-26 | Stop reason: HOSPADM

## 2018-01-15 RX ORDER — NYSTATIN AND TRIAMCINOLONE ACETONIDE 100000; 1 [USP'U]/G; MG/G
CREAM TOPICAL 4 TIMES DAILY PRN
Status: DISCONTINUED | OUTPATIENT
Start: 2018-01-15 | End: 2018-01-26 | Stop reason: HOSPADM

## 2018-01-15 RX ORDER — TORSEMIDE 20 MG/1
20 TABLET ORAL 2 TIMES DAILY
Status: DISCONTINUED | OUTPATIENT
Start: 2018-01-15 | End: 2018-01-16

## 2018-01-15 RX ORDER — ASPIRIN 81 MG/1
81 TABLET ORAL DAILY
Status: DISCONTINUED | OUTPATIENT
Start: 2018-01-15 | End: 2018-01-26 | Stop reason: HOSPADM

## 2018-01-15 RX ORDER — LATANOPROST 50 UG/ML
1 SOLUTION/ DROPS OPHTHALMIC DAILY
Status: DISCONTINUED | OUTPATIENT
Start: 2018-01-15 | End: 2018-01-26 | Stop reason: HOSPADM

## 2018-01-15 RX ORDER — LABETALOL HYDROCHLORIDE 5 MG/ML
5 INJECTION, SOLUTION INTRAVENOUS EVERY 6 HOURS PRN
Status: DISCONTINUED | OUTPATIENT
Start: 2018-01-15 | End: 2018-01-15

## 2018-01-15 RX ORDER — NITROGLYCERIN 0.4 MG/1
0.4 TABLET SUBLINGUAL EVERY 5 MIN PRN
Qty: 30 TABLET | Refills: 3 | Status: ON HOLD | OUTPATIENT
Start: 2018-01-15 | End: 2018-03-28 | Stop reason: HOSPADM

## 2018-01-15 RX ORDER — AMLODIPINE BESYLATE 5 MG/1
5 TABLET ORAL DAILY
Status: DISCONTINUED | OUTPATIENT
Start: 2018-01-15 | End: 2018-01-15

## 2018-01-15 RX ORDER — FUROSEMIDE 10 MG/ML
80 INJECTION INTRAMUSCULAR; INTRAVENOUS ONCE
Status: COMPLETED | OUTPATIENT
Start: 2018-01-15 | End: 2018-01-15

## 2018-01-15 RX ADMIN — PANTOPRAZOLE SODIUM 40 MG: 40 TABLET, DELAYED RELEASE ORAL at 08:01

## 2018-01-15 RX ADMIN — CHOLESTYRAMINE 4 G: 4 POWDER, FOR SUSPENSION ORAL at 01:01

## 2018-01-15 RX ADMIN — FUROSEMIDE 80 MG: 10 INJECTION, SOLUTION INTRAMUSCULAR; INTRAVENOUS at 12:01

## 2018-01-15 RX ADMIN — CARVEDILOL 12.5 MG: 12.5 TABLET, FILM COATED ORAL at 08:01

## 2018-01-15 RX ADMIN — TORSEMIDE 20 MG: 20 TABLET ORAL at 02:01

## 2018-01-15 RX ADMIN — HYDRALAZINE HYDROCHLORIDE 50 MG: 50 TABLET ORAL at 01:01

## 2018-01-15 RX ADMIN — HYDROXYCHLOROQUINE SULFATE 200 MG: 200 TABLET, FILM COATED ORAL at 09:01

## 2018-01-15 RX ADMIN — LABETALOL HYDROCHLORIDE 10 MG: 5 INJECTION INTRAVENOUS at 06:01

## 2018-01-15 RX ADMIN — CARVEDILOL 12.5 MG: 12.5 TABLET, FILM COATED ORAL at 06:01

## 2018-01-15 RX ADMIN — IRBESARTAN 300 MG: 300 TABLET ORAL at 09:01

## 2018-01-15 RX ADMIN — LABETALOL HYDROCHLORIDE 10 MG: 5 INJECTION INTRAVENOUS at 01:01

## 2018-01-15 RX ADMIN — HYDROXYCHLOROQUINE SULFATE 200 MG: 200 TABLET, FILM COATED ORAL at 08:01

## 2018-01-15 RX ADMIN — ASPIRIN 81 MG: 81 TABLET, COATED ORAL at 01:01

## 2018-01-15 RX ADMIN — CITALOPRAM HYDROBROMIDE 20 MG: 20 TABLET ORAL at 08:01

## 2018-01-15 RX ADMIN — ROSUVASTATIN CALCIUM 40 MG: 20 TABLET, FILM COATED ORAL at 09:01

## 2018-01-15 RX ADMIN — LATANOPROST 1 DROP: 50 SOLUTION OPHTHALMIC at 11:01

## 2018-01-15 RX ADMIN — AMLODIPINE BESYLATE 5 MG: 5 TABLET ORAL at 08:01

## 2018-01-15 RX ADMIN — LEVOTHYROXINE SODIUM 75 MCG: 75 TABLET ORAL at 06:01

## 2018-01-15 RX ADMIN — HYDRALAZINE HYDROCHLORIDE 50 MG: 50 TABLET ORAL at 08:01

## 2018-01-15 RX ADMIN — HYDRALAZINE HYDROCHLORIDE 50 MG: 50 TABLET ORAL at 09:01

## 2018-01-15 NOTE — NURSING
Pt B/P elevated 210/80 pulse 60. Contacted Dr Latif with IMT whom instructed me to contact critical care team. Called Graham in critical care and notified of BP.

## 2018-01-15 NOTE — PLAN OF CARE
Problem: Fall Risk (Adult)  Goal: Identify Related Risk Factors and Signs and Symptoms  Related risk factors and signs and symptoms are identified upon initiation of Human Response Clinical Practice Guideline (CPG)   Outcome: Ongoing (interventions implemented as appropriate)  Reviewed POC. Pt verbalized understanding.

## 2018-01-15 NOTE — PLAN OF CARE
01/15/18 1300   Discharge Assessment   Assessment Type Discharge Planning Assessment   Confirmed/corrected address and phone number on facesheet? Yes   Assessment information obtained from? Patient   Expected Length of Stay (days) 3   Communicated expected length of stay with patient/caregiver yes   Prior to hospitilization cognitive status: Alert/Oriented   Prior to hospitalization functional status: Independent   Current cognitive status: Alert/Oriented   Current Functional Status: Independent   Lives With alone   Able to Return to Prior Arrangements yes   Is patient able to care for self after discharge? Yes   Patient's perception of discharge disposition home or selfcare   Readmission Within The Last 30 Days no previous admission in last 30 days   Patient currently being followed by outpatient case management? No   Equipment Currently Used at Home none   Do you have any problems affording any of your prescribed medications? No   Is the patient taking medications as prescribed? yes   Does the patient have transportation home? Yes   Discharge Plan A Home   Discharge Plan B Home Health   Patient/Family In Agreement With Plan yes   No anticipated discharge needs.

## 2018-01-15 NOTE — NURSING
Pts /80, asymptomatic. Notified Graham in critical care of current Bp. Graham states BP range 160-180s ok with pt asymptomatic.

## 2018-01-15 NOTE — PT/OT/SLP EVAL
Physical Therapy Evaluation and Discharge Note    Patient Name:  Ewelina Arnold   MRN:  [id number]    Recommendations:     Discharge Recommendations:  home   Discharge Equipment Recommendations: none   Barriers to discharge: None    Assessment:     Ewelina Arnold is a 72 y.o. female admitted with a medical diagnosis of Hypertensive emergency. .  At this time, patient is functioning at their prior level of function and does not require further acute PT services.     Recent Surgery: * No surgery found *      Plan:     During this hospitalization, patient does not require further acute PT services.  Please re-consult if situation changes.     Plan of Care Reviewed with: patient    Subjective     Communicated with nurse prior to session.  Patient found supine w/ hob elevated upon PT entry to room, agreeable to evaluation.      Chief Complaint: dizziness 2/2 medication side effects  Patient comments/goals: return to plof  Pain/Comfort:  · Pain Rating 1: 0/10    Patients cultural, spiritual, [employer]ian conflicts given the current situation: none stated    Living Environment:  Patient lives in a single story house, 7STE w/ BHR. Her grandson lives w/ her but she was Isabel PTA.    Prior to admission, patients level of function was Independent.  Patient has the following equipment: none.  DME owned (not currently used): none.  Upon discharge, patient will have assistance from maribel.    Objective:     Patient found with: telemetry     General Precautions: Standard, fall   Orthopedic Precautions:N/A   Braces:       Exams:  · Cognitive Exam:  Patient is oriented to Person, Place, Time and Situation and follows 100% of all commands   · Fine Motor Coordination:    · -       Intact  · Gross Motor Coordination:  WFL  · Sensation:    · -       Intact  · RLE ROM: WFL  · RLE Strength: WFL  · LLE ROM: WFL  · LLE Strength: WFL    Functional Mobility:  · Bed Mobility:     · Supine to Sit: independence  · Sit to Supine:  independence  · Transfers:     · Sit to Stand:  supervision with no AD  · Gait: 60'  · Stairs:  Pt ascended/descended 7 stair(s) with No Assistive Device with left handrail with Supervision or Set-up Assistance.     AM-PAC 6 CLICK MOBILITY  Total Score:24       Therapeutic Activities and Exercises:   PT Eval completed    Patient left with bed in chair position with all lines intact, call button in reach and nurse notified.    GOALS:    Physical Therapy Goals     Not on file          Multidisciplinary Problems (Resolved)        Problem: Physical Therapy Goal    Goal Priority Disciplines Outcome Goal Variances Interventions   Physical Therapy Goal   (Resolved)     PT/OT, PT Outcome(s) achieved     Description:  Eval and DC complete  Patient has no inpatient PT needs  Discontinue Orders                        History:     Past Medical History:   Diagnosis Date    Acute on chronic diastolic congestive heart failure 11/17/2017    Allergy     Anatomical narrow angle glaucoma     Anemia     States diagnosed about a month ago    Aortic atherosclerosis     Arthritis     Cataract     CHF (congestive heart failure)     Chronic kidney disease     Chronic sciatica of left side     Right lower back pain due to sciatica    Coronary artery disease     Depression     Dry eyes     GERD (gastroesophageal reflux disease)     History of colon cancer     Hyperlipidemia     Hypertension     Hypothyroidism     Left ventricular diastolic dysfunction with preserved systolic function     Lupus (systemic lupus erythematosus) 8/17/2012    Malnutrition 5/16/2017    Osteoarthritis of cervical spine 8/17/2012    Osteopenia     PAD (peripheral artery disease)        Past Surgical History:   Procedure Laterality Date    CARDIAC CATHETERIZATION  07/27/2011    x1    CHOLECYSTECTOMY  1999    COLON SURGERY  2000 & 2003    partial removal    COLONOSCOPY N/A 4/14/2016    Procedure: COLONOSCOPY;  Surgeon: Jose Steen MD;   [facility]  Service: Endoscopy;  Laterality: N/A;  prep 2 days prior light meals only/clear liquid day before  and 4 dulcolax tabs (5 mgs) at noon    COLONOSCOPY N/A 9/14/2017    Procedure: COLONOSCOPY;  Surgeon: Jose Steen MD;  [facility]  Service: Endoscopy;  Laterality: N/A;    EYE SURGERY      HAND SURGERY Bilateral 1996 & 1997    due to carpal tunnel     HERNIA REPAIR      HYSTERECTOMY  1983    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    OOPHORECTOMY      removed one    Peripheral Iridotomy both eyes  1994    laser angle correction    THYROIDECTOMY, PARTIAL  2006    to remove two nodules and one-half thyroid       Clinical Decision Making:     History  Co-morbidities and personal factors that may impact the plan of care Examination  Body Structures and Functions, activity limitations and participation restrictions that may impact the plan of care Clinical Presentation   Decision Making/ Complexity Score   Co-morbidities:   [] Time since onset of injury / illness / exacerbation  [] Status of current condition  []Patient's cognitive status and safety concerns    [] Multiple Medical Problems (see med hx)  Personal Factors:   [] Patient's age  [] Prior Level of function   [] Patient's home situation (environment and family support)  [] Patient's level of motivation  [] Expected progression of patient      HISTORY:(criteria)    [] 66158 - no personal factors/history    [] 20855 - has 1-2 personal factor/comorbidity     [] 48316 - has >3 personal factor/comorbidity     Body Regions:  [] Objective examination findings  [] Head     []  Neck  [] Trunk   [] Upper Extremity  [] Lower Extremity    Body Systems:  [] For communication ability, affect, cognition, language, and learning style: the assessment of the ability to make needs known, consciousness, orientation (person, place, and time), expected emotional /behavioral responses, and learning preferences (eg, learning  barriers, education  needs)  [] For the neuromuscular system: a general assessment of gross coordinated movement (eg, balance, gait, locomotion, transfers, and transitions) and motor function  (motor control and motor learning)  [] For the musculoskeletal system: the assessment of gross symmetry, gross range of motion, gross strength, height, and weight  [] For the integumentary system: the assessment of pliability(texture), presence of scar formation, skin color, and skin integrity  [] For cardiovascular/pulmonary system: the assessment of heart rate, respiratory rate, blood pressure, and edema     Activity limitations:    [] Patient's cognitive status and saf ety concerns          [] Status of current condition      [] Weight bearing restriction  [] Cardiopulmunary Restriction    Participation Restrictions:   [] Goals and goal agreement with the patient     [] Rehab potential (prognosis) and probable outcome      Examination of Body System: (criteria)    [] 67434 - addressing 1-2 elements    [] 30702 - addressing a total of 3 or more elements     [] 73085 -  Addressing a total of 4 or more elements         Clinical Presentation: (criteria)  Stable - 51591     On examination of body system using standardized tests and measures patient presents with 1-2 elements from any of the following: body structures and functions, activity limitations, and/or participation restrictions.  Leading to a clinical presentation that is considered stable and/or uncomplicated                              Clinical Decision Making  (Eval Complexity):  Low- 41967     Time Tracking:     PT Received On: 01/15/18  PT Start Time: 0748     PT Stop Time: 0800  PT Total Time (min): 12 min     Billable Minutes: Evaluation 12 Min      Xavier Weston PT  01/15/2018

## 2018-01-15 NOTE — PT/OT/SLP EVAL
Occupational Therapy   Evaluation and Discharge Note    Name: Ewelina Arnold  MRN: [id number]  Admitting Diagnosis:  Hypertensive emergency      Recommendations:     Discharge Recommendations: home  Discharge Equipment Recommendations:  none  Barriers to discharge:  None    History:     Occupational Profile:  Living Environment: Pt lives in 1 story home and grandson has been staying with her and is available to assist if needed.   Previous level of function: Pt I with all self care and community access  Equipment Owned:  none  Assistance upon Discharge: Pt able to return to home without continued skilled OT services    Past Medical History:   Diagnosis Date    Acute on chronic diastolic congestive heart failure 11/17/2017    Allergy     Anatomical narrow angle glaucoma     Anemia     States diagnosed about a month ago    Aortic atherosclerosis     Arthritis     Cataract     CHF (congestive heart failure)     Chronic kidney disease     Chronic sciatica of left side     Right lower back pain due to sciatica    Coronary artery disease     Depression     Dry eyes     GERD (gastroesophageal reflux disease)     History of colon cancer     Hyperlipidemia     Hypertension     Hypothyroidism     Left ventricular diastolic dysfunction with preserved systolic function     Lupus (systemic lupus erythematosus) 8/17/2012    Malnutrition 5/16/2017    Osteoarthritis of cervical spine 8/17/2012    Osteopenia     PAD (peripheral artery disease)        Past Surgical History:   Procedure Laterality Date    CARDIAC CATHETERIZATION  07/27/2011    x1    CHOLECYSTECTOMY  1999    COLON SURGERY  2000 & 2003    partial removal    COLONOSCOPY N/A 4/14/2016    Procedure: COLONOSCOPY;  Surgeon: Jose Steen MD;  Location: [facility] ([address] [place] [address]);  Service: Endoscopy;  Laterality: N/A;  prep 2 days prior light meals only/clear liquid day before  and 4 dulcolax tabs (5 mgs) at noon    COLONOSCOPY N/A 9/14/2017    Procedure:  COLONOSCOPY;  Surgeon: Jose Steen MD;  Location: [facility] ([address] [place] [address]);  Service: Endoscopy;  Laterality: N/A;    EYE SURGERY      HAND SURGERY Bilateral 1996 & 1997    due to carpal tunnel     HERNIA REPAIR      HYSTERECTOMY  1983    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  Pain/Comfort:  · Pain Rating 1: 0/10    Objective:     Patient found with: telemetry    General Precautions: Standard, fall   Orthopedic Precautions:N/A   Braces: N/A     Occupational Performance:    Bed Mobility:    · Patient completed Rolling/Turning to Left with  independence  · Patient completed Rolling/Turning to Right with independence  · Patient completed Scooting/Bridging with independence  · Patient completed Supine to Sit with independence  · Patient completed Sit to Supine with independence    Functional Mobility/Transfers:  · Patient completed Sit <> Stand Transfer with independence  with  no assistive device   · Patient completed Toilet Transfer Stand Pivot technique with independence with  no AD    Activities of Daily Living:  · UB Dressing: independence    · LB Dressing: independence    · Toileting: independence      Cognitive/Visual Perceptual:  Cognitive/Psychosocial Skills:     -       Oriented to: Person, Place, Time and Situation   -       Follows Commands/attention:Follows multistep  commands  -       Communication: clear/fluent  -       Memory: No Deficits noted  -       Safety awareness/insight to disability: intact   -       Mood/Affect/Coping skills/emotional control: Appropriate to situation    Physical Exam:  Postural examination/scapula alignment:    -       No postural abnormalities  "identified  Skin integrity: Visible skin intact  Edema:  None noted  Sensation:    -       Intact  Upper Extremity Range of Motion:     -       Right Upper Extremity: WNL  -       Left Upper Extremity: WNL  Upper Extremity Strength:    -       Right Upper Extremity: WNL  -       Left Upper Extremity: WNL    Patient left supine with all lines intact    AMPAC 6 Click:  AMPAC Total Score: 24    Treatment & Education:  Evaluation complete and pt at baseline and without skilled OT need.    Education:    Assessment:     Ewelina Arnold is a 72 y.o. female with a medical diagnosis of Hypertensive emergency. At this time, patient is functioning at their prior level of function and does not require further acute OT services.     Clinical Decision Makin.  OT Low:  "Pt evaluation falls under low complexity for evaluation coding due to performance deficits noted in 1-3 areas as stated above and 0 co-morbities affecting current functional status. Data obtained from problem focused assessments. No modifications or assistance was required for completion of evaluation. Only brief occupational profile and history review completed."     Plan:     During this hospitalization, patient does not require further acute OT services.  Please re-consult if situation changes.    · Plan of Care Reviewed with: patient    This Plan of care has been discussed with the patient who was involved in its development and understands and is in agreement with the identified goals and treatment plan    GOALS:    Occupational Therapy Goals     Not on file          Multidisciplinary Problems (Resolved)        Problem: Occupational Therapy Goal    Goal Priority Disciplines Outcome Interventions   Occupational Therapy Goal   (Resolved)     OT, PT/OT Outcome(s) achieved                    Time Tracking:     OT Date of Treatment: 01/15/18  OT Start Time: 30  OT Stop Time: 0745  OT Total Time (min): 15 min    Billable Minutes:Evaluation 15    Janeth P " Radhames, OT  1/15/2018

## 2018-01-15 NOTE — PLAN OF CARE
Problem: Physical Therapy Goal  Goal: Physical Therapy Goal  Eval and DC complete  Patient has no inpatient PT needs  Discontinue Orders      Outcome: Outcome(s) achieved Date Met: 01/15/18  Eval and DC complete  Patient has no inpatient PT needs  Discontinue Orders    Xavier Weston, PT  1/15/2018

## 2018-01-15 NOTE — TELEPHONE ENCOUNTER
----- Message from Tony Dyer sent at 1/12/2018  4:20 PM CST -----  Contact: Patient [phone]  She said Joseph faxed a preauthorization to you this morning for butalbital-acetaminophen-caffeine -40 mg (FIORICET, ESGIC) -40 mg per tablet, and she wants to know if you have received it.    Please call advise    Thank you

## 2018-01-15 NOTE — PLAN OF CARE
Problem: Occupational Therapy Goal  Goal: Occupational Therapy Goal  Outcome: Outcome(s) achieved Date Met: 01/15/18  Evaluation complete and pt with no skilled OT need.  DC skilled OT  Janeth Ricci, OT  1/15/2018

## 2018-01-15 NOTE — PLAN OF CARE
Problem: Patient Care Overview  Goal: Plan of Care Review  Outcome: Ongoing (interventions implemented as appropriate)  Greeted patient and gained consent for nursing care. She uses commode on bed side. No complaint of pain. Assisted in her needs. Put bed on lowest position, locked. Patient on non-skid socks. Call bell within reach. Will continue to monitor.

## 2018-01-15 NOTE — PROGRESS NOTES
Progress Note  Hospital Medicine    Admit Date: 1/14/2018  Length of Stay:  LOS: 1 day     SUBJECTIVE:         Follow-up For:  Hypertensive emergency    HPI/Interval history (See H&P for complete P,F,SHx) :     1/15/18   SBP 190s. does not want to take amlodipine due to history of pedal edema. Hydralazine increased to 50mg q 8hrly and IV labetalol PRN       Review of Systems: List if applicable  Pain scale: 0/10  Constitutional- Positive for Weakness  Head ache and dizziness resolved. denies vertigo    OBJECTIVE:     Vital Signs Range (Last 24H):  Temp:  [97.2 °F (36.2 °C)-98.9 °F (37.2 °C)]   Pulse:  [58-65]   Resp:  [16-20]   BP: (148-210)/(66-90)   SpO2:  [94 %-99 %]     Physical Exam:  General- Patient alert and oriented x3   HEENT- PERRLA, EOMI, OP clear  Neck- No JVD, Lymphadenopathy, Thyromegaly  CV- Regular rate and rhythm, No Murmur/ruma/rubs  Resp- Lungs CTA Bilaterally, No increased WOB  Abdomen- Non tender/non-distended, BS normoactive x4 quads, no HSM  Extrem- No cyanosis, clubbing, edema.   Skin- No rashes, lesions, ulcers  Neuro- able to move upper and lower extremities without limitation      Medications:  Medication list was reviewed and changes noted under Assessment/Plan.      Current Facility-Administered Medications:     carvedilol tablet 12.5 mg, 12.5 mg, Oral, BID WM, Shari Peaceuterive, NP, 12.5 mg at 01/15/18 0830    cholestyramine 4 gram packet 4 g, 1 packet, Oral, TID WM, Magdy Bella MD    citalopram tablet 20 mg, 20 mg, Oral, Daily, Fernanda Ann MD, 20 mg at 01/15/18 0830    dextrose 50% injection 12.5 g, 12.5 g, Intravenous, PRN, Fernanda Ann MD    dextrose 50% injection 25 g, 25 g, Intravenous, PRN, Fernanda Ann MD    enoxaparin injection 40 mg, 40 mg, Subcutaneous, Daily, Fernanda Ann MD, 40 mg at 01/14/18 1730    glucagon (human recombinant) injection 1 mg, 1 mg, Intramuscular, PRN, Fernanda Ann MD    glucose chewable tablet 16 g, 16 g,  Oral, PRN, Fernanda Ann MD    glucose chewable tablet 24 g, 24 g, Oral, PRN, Fernanda Ann MD    hydrALAZINE tablet 50 mg, 50 mg, Oral, Q8H, Magdy Bella MD, 50 mg at 01/15/18 0830    hydroxychloroquine tablet 200 mg, 200 mg, Oral, BID, Fernanda Ann MD, 200 mg at 01/15/18 0830    irbesartan tablet 300 mg, 300 mg, Oral, QHS, Shari ROBERTSON Dauterive, NP, 300 mg at 01/14/18 2053    latanoprost 0.005 % ophthalmic solution 1 drop, 1 drop, Both Eyes, Daily, Magdy Bella MD, 1 drop at 01/15/18 1142    levothyroxine tablet 75 mcg, 75 mcg, Oral, Before breakfast, Fernanda Ann MD, 75 mcg at 01/15/18 0608    ondansetron disintegrating tablet 8 mg, 8 mg, Oral, Q8H PRN, Fernanda Ann MD    pantoprazole EC tablet 40 mg, 40 mg, Oral, Daily, Fernanda Ann MD, 40 mg at 01/15/18 0830    promethazine tablet 25 mg, 25 mg, Oral, Q6H PRN, Fernanda Ann MD    rosuvastatin tablet 40 mg, 40 mg, Oral, QHS, Fernanda Ann MD, 40 mg at 01/14/18 2054    sodium chloride 0.9% flush 3 mL, 3 mL, Intravenous, PRN, Fernanda Ann MD    dextrose 50%, dextrose 50%, glucagon (human recombinant), glucose, glucose, ondansetron, promethazine, sodium chloride 0.9%    Laboratory/Diagnostic Data:  Reviewed and noted in plan where applicable- Please see chart for full lab data.      Recent Labs  Lab 01/14/18  0700 01/15/18  0527   WBC 3.82* 3.91   HGB 10.1* 8.9*   HCT 32.3* 27.9*    178         Recent Labs  Lab 01/09/18  1111 01/14/18  0700 01/15/18  0527    137 138   K 4.6 4.4 4.0    104 104   CO2 21* 24 26   BUN 45* 47* 60*   CREATININE 2.0* 2.1* 2.3*   GLU 60* 72 80   CALCIUM 8.8 9.0 8.5*   MG  --  1.8 1.6   PHOS  --   --  4.6*         Recent Labs  Lab 01/14/18  0700 01/15/18  0527   ALKPHOS 84 76   ALT 25 32   AST 55* 57*   ALBUMIN 3.1* 2.7*   PROT 7.7 6.6   BILITOT 0.3 0.2        Microbiology labs for the last week  Microbiology Results (last 7 days)     ** No results found for  the last 168 hours. **           Imaging Results          X-Ray Chest 1 View (Final result)  Result time 01/15/18 09:25:06    Final result by Roseline Zhu MD (01/15/18 09:25:06)                 Impression:      Interval diuresis with resolution of pulmonary edema.  However a small amount of RIGHT subpulmonic pleural fluid persists, creating a juxtaphrenic peak sign where it inserts into the caudal margin of the RIGHT inferior accessory fissure.    No new disease.      Electronically signed by: Roseline Zhu MD  Date:     01/15/18  Time:    09:25              Narrative:    Time of Procedure: 01/15/18 05:50:00  Accession # [id number]    Comparison: 11/27/2017.    Number of views: 1.    Findings:  Chest radiograph performed 11/27/2017 revealed extensive interstitial pulmonary edema plus dependent RIGHT pleural fluid and some consolidation in the lateral aspect the RIGHT lower lung zone.    There is been marked improvement in these abnormalities on today's study.  There is still a small amount of dependent RIGHT pleural fluid, well appreciated because it inserts into the inferior aspect of the RIGHT inferior accessory fissure creating a juxtaphrenic peak sign.  However there is no evidence of pulmonary edema and no LEFT pleural fluid.    There is no airspace disease, pneumothorax, pneumomediastinum, pneumoperitoneum or significant osseous abnormality.  Metallic hardware projects over the upper abdomen similar to the earlier study.                             MRI Brain Without Contrast (Final result)  Result time 01/14/18 13:10:30    Final result by Marina Pfeiffer MD (01/14/18 13:10:30)                 Impression:        No acute abnormality.  ______________________________________     Electronically signed by resident: THOR SARMIENTO MD  Date:     01/14/18  Time:    12:48            As the supervising and teaching physician, I personally reviewed the images and resident's interpretation and I agree with the  findings.          Electronically signed by: JANETH MCGARRY MD  Date:     01/14/18  Time:    13:10              Narrative:    CLINICAL INDICATION: 72 year old F with severe vertigo in setting of hypertensive emergency     TECHNIQUE: Routine MRI brain without contrast. Multiplanar multisequence MR imaging of the brain was performed without intravenous contrast.    COMPARISON: CT head 1/14/17    FINDINGS:    Intracranial compartment:    Ventricles and sulci are normal in size for age without evidence of hydrocephalus. No extra-axial blood or fluid collections.    The brain parenchyma demonstrate mild chronic microvascular ischemic change in the supratentorial white matter. No mass lesion, acute hemorrhage, edema or acute infarct.    Normal vascular flow voids are preserved.      Skull/extracranial contents (limited evaluation): Bone marrow signal intensity is normal.                             CT Head Without Contrast (Final result)  Result time 01/14/18 06:57:56    Final result by Gayle Aquino MD (01/14/18 06:57:56)                 Impression:      1.  No CT evidence of acute intracranial abnormality. Clinical correlation and further evaluation as warrented.     2.  Mild generalized cerebral volume loss and microangiopathic change, similar to prior examination.      Electronically signed by: GAYLE AQUINO  Date:     01/14/18  Time:    06:57              Narrative:    TECHNIQUE:  Multiple contiguous axial images of the brain were obtained from base to the vertex without the use of intravenous contrast. Sagittal and coronal reconstruction images were formatted in postprocessing.    COMPARISON:    Head CT 9/20/2017    FINDINGS:      There is no acute intracranial hemorrhage, hydrocephalus, midline shift or mass effect. There is mild generalized cerebral volume loss and mild bilateral deep cerebral white matter microangiopathic changes.  Gray-white matter differentiation otherwise is maintained. The basal cisterns  "are patent. The mastoid air cells and paranasal sinuses are clear of acute process. The visualized bones of the calvarium demonstrate no acute osseous abnormality.                              Estimated body mass index is 19.44 kg/m² as calculated from the following:    Height as of this encounter: 5' 5" (1.651 m).    Weight as of this encounter: 53 kg (116 lb 13.5 oz).    I & O (Last 24H):    Intake/Output Summary (Last 24 hours) at 01/15/18 1228  Last data filed at 01/15/18 0200   Gross per 24 hour   Intake              120 ml   Output              300 ml   Net             -180 ml       Estimated Creatinine Clearance: 18.5 mL/min (based on SCr of 2.3 mg/dL (H)).    ASSESSMENT/PLAN:     Active Problems:    Active Hospital Problems    Diagnosis  POA    *Hypertensive emergency [I16.1]Off Necardipine drip l likely rebound HTN from stopping clonidine.SBP 190s. does not want to take amlodipine due to history of pedal edema. Hydralazine increased to 50mg q 8hrly and IV labtaolol PRN The current medical regimen is effective;  continue irbesartan and carvedilol.started on torsemide   Yes    Aortic stenosis [I35.0]Moderate aortic stenosis, PABLITO = 0.93 cm2  Unknown    Cor pulmonale, chronic [I27.81]Pulmonary hypertension. The estimated PA systolic pressure is 68 mmHg.   Unknown    Chronic kidney disease, stage IV (severe) [N18.4]  Unknown    Vertigo [R42]denies vertigo, only had lightheadedness. MRI and CT brain -No acute abnormality.  Yes    Dizzy [R42]resolved as above  Yes    Anemia associated with chronic renal failure [D63.1]Hb 8.9 . Normocytic   Yes    CKD (chronic kidney disease) [N18.9]4- Solitary kidney. seems to be at baseline, follows up with nephrology.Followed by Dr. Ruffin in nephrology  Yes    Hyperlipidemia [E78.5]on rosuvastatin and cholestyramine  Yes    CAD (coronary artery disease) [I25.10]Asymptomatic. continue  low dose ASA.    Chronic diastolic heart failure - Well compensated. Continue daily " weights and torsemide 20mg qam. Torsemide 10mg in the qpm PRN weight gain/leg swelling/SOB  Yes     Chronic    Lupus (systemic lupus erythematosus) [M32.9].Continue  mg/daily now  Yes     Chronic     Early 1990's developed acute respiratory failure and spent weeks in ICU [facility]   Positive CARYN, SSB  Hx Rash, leukopenia,         Resolved Hospital Problems    Diagnosis Date Resolved POA   No resolved problems to display.         Disposition- Home    DVT prophylaxis addressed with: B/L SCD            Magdy Bella MD  Attending Staff Physician  [facility]  pager- [phone]  Spectr[employer]lf - [phone]

## 2018-01-15 NOTE — TELEPHONE ENCOUNTER
Patient advised Prior authorization has been completed for butalbital-acetaminophen-caffeine -40 mg,  which usually takes 24-48 hours before they send an approval or denial letter. Advised patient if the medication is still denied per the insurance company he would need to contact them to see whats covered per your formulary as a alternative for this medication.  Patient verbalizes understanding.      Patient would also like the pended medication sent to [facility].

## 2018-01-16 PROBLEM — I27.81 COR PULMONALE, CHRONIC: Status: ACTIVE | Noted: 2018-01-16

## 2018-01-16 PROBLEM — I35.0 AORTIC STENOSIS: Status: ACTIVE | Noted: 2018-01-16

## 2018-01-16 PROBLEM — I16.1 HYPERTENSIVE EMERGENCY: Status: RESOLVED | Noted: 2018-01-14 | Resolved: 2018-01-16

## 2018-01-16 PROBLEM — R42 DIZZY: Status: RESOLVED | Noted: 2018-01-14 | Resolved: 2018-01-16

## 2018-01-16 PROBLEM — R42 VERTIGO: Status: RESOLVED | Noted: 2018-01-14 | Resolved: 2018-01-16

## 2018-01-16 PROBLEM — N18.4 CHRONIC KIDNEY DISEASE, STAGE IV (SEVERE): Status: ACTIVE | Noted: 2018-01-16

## 2018-01-16 LAB
ALBUMIN SERPL BCP-MCNC: 2.6 G/DL
ALP SERPL-CCNC: 73 U/L
ALT SERPL W/O P-5'-P-CCNC: 27 U/L
ANION GAP SERPL CALC-SCNC: 9 MMOL/L
AST SERPL-CCNC: 45 U/L
BASOPHILS # BLD AUTO: 0.02 K/UL
BASOPHILS NFR BLD: 0.5 %
BILIRUB SERPL-MCNC: 0.2 MG/DL
BUN SERPL-MCNC: 63 MG/DL
CALCIUM SERPL-MCNC: 8.4 MG/DL
CHLORIDE SERPL-SCNC: 105 MMOL/L
CO2 SERPL-SCNC: 26 MMOL/L
CREAT SERPL-MCNC: 2.3 MG/DL
DIFFERENTIAL METHOD: ABNORMAL
EOSINOPHIL # BLD AUTO: 0.1 K/UL
EOSINOPHIL NFR BLD: 1.9 %
ERYTHROCYTE [DISTWIDTH] IN BLOOD BY AUTOMATED COUNT: 14 %
EST. GFR  (AFRICAN AMERICAN): 23.8 ML/MIN/1.73 M^2
EST. GFR  (NON AFRICAN AMERICAN): 20.6 ML/MIN/1.73 M^2
GLUCOSE SERPL-MCNC: 78 MG/DL
HCT VFR BLD AUTO: 29 %
HGB BLD-MCNC: 9.2 G/DL
IMM GRANULOCYTES # BLD AUTO: 0.01 K/UL
IMM GRANULOCYTES NFR BLD AUTO: 0.2 %
LYMPHOCYTES # BLD AUTO: 1.2 K/UL
LYMPHOCYTES NFR BLD: 28.4 %
MAGNESIUM SERPL-MCNC: 1.7 MG/DL
MCH RBC QN AUTO: 25.4 PG
MCHC RBC AUTO-ENTMCNC: 31.7 G/DL
MCV RBC AUTO: 80 FL
MONOCYTES # BLD AUTO: 0.7 K/UL
MONOCYTES NFR BLD: 17.4 %
NEUTROPHILS # BLD AUTO: 2.2 K/UL
NEUTROPHILS NFR BLD: 51.6 %
NRBC BLD-RTO: 0 /100 WBC
PHOSPHATE SERPL-MCNC: 5.1 MG/DL
PLATELET # BLD AUTO: 169 K/UL
PMV BLD AUTO: 11.5 FL
POTASSIUM SERPL-SCNC: 3.9 MMOL/L
PROT SERPL-MCNC: 6.6 G/DL
RBC # BLD AUTO: 3.62 M/UL
SODIUM SERPL-SCNC: 140 MMOL/L
WBC # BLD AUTO: 4.26 K/UL

## 2018-01-16 PROCEDURE — 25000003 PHARM REV CODE 250: Performed by: STUDENT IN AN ORGANIZED HEALTH CARE EDUCATION/TRAINING PROGRAM

## 2018-01-16 PROCEDURE — 36415 COLL VENOUS BLD VENIPUNCTURE: CPT

## 2018-01-16 PROCEDURE — 11000001 HC ACUTE MED/SURG PRIVATE ROOM

## 2018-01-16 PROCEDURE — 85025 COMPLETE CBC W/AUTO DIFF WBC: CPT

## 2018-01-16 PROCEDURE — 84100 ASSAY OF PHOSPHORUS: CPT

## 2018-01-16 PROCEDURE — 83735 ASSAY OF MAGNESIUM: CPT

## 2018-01-16 PROCEDURE — 80053 COMPREHEN METABOLIC PANEL: CPT

## 2018-01-16 PROCEDURE — 25000003 PHARM REV CODE 250: Performed by: HOSPITALIST

## 2018-01-16 PROCEDURE — 25000003 PHARM REV CODE 250: Performed by: NURSE PRACTITIONER

## 2018-01-16 PROCEDURE — 99233 SBSQ HOSP IP/OBS HIGH 50: CPT | Mod: ,,, | Performed by: HOSPITALIST

## 2018-01-16 RX ORDER — ACETAMINOPHEN 325 MG/1
650 TABLET ORAL ONCE
Status: COMPLETED | OUTPATIENT
Start: 2018-01-16 | End: 2018-01-16

## 2018-01-16 RX ORDER — HYDRALAZINE HYDROCHLORIDE 25 MG/1
75 TABLET, FILM COATED ORAL EVERY 8 HOURS
Qty: 270 TABLET | Refills: 11 | Status: SHIPPED | OUTPATIENT
Start: 2018-01-16 | End: 2018-01-25 | Stop reason: HOSPADM

## 2018-01-16 RX ORDER — TORSEMIDE 20 MG/1
20 TABLET ORAL DAILY
Status: DISCONTINUED | OUTPATIENT
Start: 2018-01-17 | End: 2018-01-22

## 2018-01-16 RX ORDER — TORSEMIDE 20 MG/1
20 TABLET ORAL DAILY
Qty: 60 TABLET | Refills: 2 | Status: SHIPPED | OUTPATIENT
Start: 2018-01-16 | End: 2018-01-25 | Stop reason: HOSPADM

## 2018-01-16 RX ADMIN — HYDRALAZINE HYDROCHLORIDE 50 MG: 50 TABLET ORAL at 05:01

## 2018-01-16 RX ADMIN — HYDRALAZINE HYDROCHLORIDE 75 MG: 50 TABLET ORAL at 01:01

## 2018-01-16 RX ADMIN — IRBESARTAN 300 MG: 300 TABLET ORAL at 09:01

## 2018-01-16 RX ADMIN — CARVEDILOL 12.5 MG: 12.5 TABLET, FILM COATED ORAL at 04:01

## 2018-01-16 RX ADMIN — HYDROXYCHLOROQUINE SULFATE 200 MG: 200 TABLET, FILM COATED ORAL at 09:01

## 2018-01-16 RX ADMIN — HYDROXYCHLOROQUINE SULFATE 200 MG: 200 TABLET, FILM COATED ORAL at 10:01

## 2018-01-16 RX ADMIN — ACETAMINOPHEN 650 MG: 325 TABLET ORAL at 04:01

## 2018-01-16 RX ADMIN — CITALOPRAM HYDROBROMIDE 20 MG: 20 TABLET ORAL at 10:01

## 2018-01-16 RX ADMIN — CHOLESTYRAMINE 4 G: 4 POWDER, FOR SUSPENSION ORAL at 08:01

## 2018-01-16 RX ADMIN — CHOLESTYRAMINE 4 G: 4 POWDER, FOR SUSPENSION ORAL at 06:01

## 2018-01-16 RX ADMIN — CARVEDILOL 12.5 MG: 12.5 TABLET, FILM COATED ORAL at 10:01

## 2018-01-16 RX ADMIN — LEVOTHYROXINE SODIUM 75 MCG: 75 TABLET ORAL at 05:01

## 2018-01-16 RX ADMIN — HYDRALAZINE HYDROCHLORIDE 75 MG: 50 TABLET ORAL at 07:01

## 2018-01-16 RX ADMIN — LABETALOL HYDROCHLORIDE 10 MG: 5 INJECTION INTRAVENOUS at 11:01

## 2018-01-16 RX ADMIN — TORSEMIDE 20 MG: 20 TABLET ORAL at 10:01

## 2018-01-16 RX ADMIN — LABETALOL HYDROCHLORIDE 10 MG: 5 INJECTION INTRAVENOUS at 06:01

## 2018-01-16 RX ADMIN — ROSUVASTATIN CALCIUM 40 MG: 20 TABLET, FILM COATED ORAL at 09:01

## 2018-01-16 RX ADMIN — LABETALOL HYDROCHLORIDE 10 MG: 5 INJECTION INTRAVENOUS at 05:01

## 2018-01-16 RX ADMIN — HYDRALAZINE HYDROCHLORIDE 75 MG: 50 TABLET ORAL at 10:01

## 2018-01-16 RX ADMIN — ASPIRIN 81 MG: 81 TABLET, COATED ORAL at 10:01

## 2018-01-16 RX ADMIN — CHOLESTYRAMINE 4 G: 4 POWDER, FOR SUSPENSION ORAL at 01:01

## 2018-01-16 NOTE — PROGRESS NOTES
Progress Note  Hospital Medicine    Admit Date: 1/14/2018  Length of Stay:  LOS: 2 days     SUBJECTIVE:         Follow-up For:  Hypertensive emergency    HPI/Interval history (See H&P for complete P,F,SHx) :     1/15/18   SBP 190s. does not want to take amlodipine due to history of pedal edema. Hydralazine increased to 50mg q 8hrly and IV labetalol PRN     1/16/18  BP - 194/60 this AM and noon improved with IV labetalol. received IV labetalol x  2 yesterday. Not comfortable going home with fluctuation in Blood pressure. Hydralazine increased to 75mg q 8hrly      Review of Systems: List if applicable  Pain scale: 0/10  Constitutional- Positive for Weakness  Head ache and dizziness resolved. denies vertigo    OBJECTIVE:     Vital Signs Range (Last 24H):  Temp:  [97.2 °F (36.2 °C)-98.7 °F (37.1 °C)]   Pulse:  [60-66]   Resp:  [16-18]   BP: (162-207)/(60-94)   SpO2:  [93 %-98 %]     Physical Exam:  General- Patient alert and oriented x3   HEENT- PERRLA, EOMI, OP clear  Neck- No JVD, Lymphadenopathy, Thyromegaly  CV- Regular rate and rhythm, No Murmur/ruma/rubs  Resp- Lungs CTA Bilaterally, No increased WOB  Abdomen- Non tender/non-distended, BS normoactive x4 quads, no HSM  Extrem- No cyanosis, clubbing, edema.   Skin- No rashes, lesions, ulcers  Neuro- able to move upper and lower extremities without limitation      Medications:  Medication list was reviewed and changes noted under Assessment/Plan.      Current Facility-Administered Medications:     aspirin EC tablet 81 mg, 81 mg, Oral, Daily, Magdy Bella MD, 81 mg at 01/16/18 1033    carvedilol tablet 12.5 mg, 12.5 mg, Oral, BID WM, Shari Brock, NP, 12.5 mg at 01/16/18 1033    cholestyramine 4 gram packet 4 g, 1 packet, Oral, TID WM, Magdy Bella MD, 4 g at 01/16/18 0850    citalopram tablet 20 mg, 20 mg, Oral, Daily, Fernanda Ann MD, 20 mg at 01/16/18 1033    dextrose 50% injection 12.5 g, 12.5 g, Intravenous, PRN, Fernanda Ann,  MD    dextrose 50% injection 25 g, 25 g, Intravenous, PRN, Fernanda Ann MD    glucagon (human recombinant) injection 1 mg, 1 mg, Intramuscular, PRN, Fernanda Ann MD    glucose chewable tablet 16 g, 16 g, Oral, PRN, Fernanda Ann MD    glucose chewable tablet 24 g, 24 g, Oral, PRN, Fernanda Ann MD    hydrALAZINE tablet 75 mg, 75 mg, Oral, Q8H, Magdy Bella MD    hydroxychloroquine tablet 200 mg, 200 mg, Oral, BID, Fernanda Ann MD, 200 mg at 01/16/18 1033    irbesartan tablet 300 mg, 300 mg, Oral, QHS, Shari ROBERTSON Dauterive, NP, 300 mg at 01/15/18 2129    labetalol injection 10 mg, 10 mg, Intravenous, Q6H PRN, Magdy Bella MD, 10 mg at 01/16/18 0532    labetalol injection 10 mg, 10 mg, Intravenous, Once, Magdy Bella MD    latanoprost 0.005 % ophthalmic solution 1 drop, 1 drop, Both Eyes, Daily, Magdy Bella MD, 1 drop at 01/15/18 1142    levothyroxine tablet 75 mcg, 75 mcg, Oral, Before breakfast, Fernanda Ann MD, 75 mcg at 01/16/18 0538    nystatin-triamcinolone cream, , Topical (Top), QID PRN, Chandan Holt PA-C    ondansetron disintegrating tablet 8 mg, 8 mg, Oral, Q8H PRN, Fernanda Ann MD    pantoprazole EC tablet 40 mg, 40 mg, Oral, Daily, Fernanda Ann MD, 40 mg at 01/15/18 0830    promethazine tablet 25 mg, 25 mg, Oral, Q6H PRN, Fernanda Ann MD    rosuvastatin tablet 40 mg, 40 mg, Oral, QHS, Fernanda Ann MD, 40 mg at 01/15/18 2129    sodium chloride 0.9% flush 3 mL, 3 mL, Intravenous, PRN, Fernanda Ann MD    torsemide tablet 20 mg, 20 mg, Oral, BID, Magdy Bella MD, 20 mg at 01/16/18 1033    dextrose 50%, dextrose 50%, glucagon (human recombinant), glucose, glucose, labetalol, nystatin-triamcinolone, ondansetron, promethazine, sodium chloride 0.9%    Laboratory/Diagnostic Data:  Reviewed and noted in plan where applicable- Please see chart for full lab data.      Recent Labs  Lab 01/14/18  0700 01/15/18  9785  01/16/18 0443   WBC 3.82* 3.91 4.26   HGB 10.1* 8.9* 9.2*   HCT 32.3* 27.9* 29.0*    178 169         Recent Labs  Lab 01/14/18  0700 01/15/18  0527 01/16/18  0443    138 140   K 4.4 4.0 3.9    104 105   CO2 24 26 26   BUN 47* 60* 63*   CREATININE 2.1* 2.3* 2.3*   GLU 72 80 78   CALCIUM 9.0 8.5* 8.4*   MG 1.8 1.6 1.7   PHOS  --  4.6* 5.1*         Recent Labs  Lab 01/14/18  0700 01/15/18  0527 01/16/18  0443   ALKPHOS 84 76 73   ALT 25 32 27   AST 55* 57* 45*   ALBUMIN 3.1* 2.7* 2.6*   PROT 7.7 6.6 6.6   BILITOT 0.3 0.2 0.2        Microbiology labs for the last week  Microbiology Results (last 7 days)     ** No results found for the last 168 hours. **           Imaging Results          X-Ray Chest 1 View (Final result)  Result time 01/15/18 09:25:06    Final result by Roseline Zhu MD (01/15/18 09:25:06)                 Impression:      Interval diuresis with resolution of pulmonary edema.  However a small amount of RIGHT subpulmonic pleural fluid persists, creating a juxtaphrenic peak sign where it inserts into the caudal margin of the RIGHT inferior accessory fissure.    No new disease.      Electronically signed by: Roseline Zhu MD  Date:     01/15/18  Time:    09:25              Narrative:    Time of Procedure: 01/15/18 05:50:00  Accession # [id number]    Comparison: 11/27/2017.    Number of views: 1.    Findings:  Chest radiograph performed 11/27/2017 revealed extensive interstitial pulmonary edema plus dependent RIGHT pleural fluid and some consolidation in the lateral aspect the RIGHT lower lung zone.    There is been marked improvement in these abnormalities on today's study.  There is still a small amount of dependent RIGHT pleural fluid, well appreciated because it inserts into the inferior aspect of the RIGHT inferior accessory fissure creating a juxtaphrenic peak sign.  However there is no evidence of pulmonary edema and no LEFT pleural fluid.    There is no airspace disease,  pneumothorax, pneumomediastinum, pneumoperitoneum or significant osseous abnormality.  Metallic hardware projects over the upper abdomen similar to the earlier study.                             MRI Brain Without Contrast (Final result)  Result time 01/14/18 13:10:30    Final result by Janeth Mcgarry MD (01/14/18 13:10:30)                 Impression:        No acute abnormality.  ______________________________________     Electronically signed by resident: THOR SARMIENTO MD  Date:     01/14/18  Time:    12:48            As the supervising and teaching physician, I personally reviewed the images and resident's interpretation and I agree with the findings.          Electronically signed by: JANETH MCGARRY MD  Date:     01/14/18  Time:    13:10              Narrative:    CLINICAL INDICATION: 72 year old F with severe vertigo in setting of hypertensive emergency     TECHNIQUE: Routine MRI brain without contrast. Multiplanar multisequence MR imaging of the brain was performed without intravenous contrast.    COMPARISON: CT head 1/14/17    FINDINGS:    Intracranial compartment:    Ventricles and sulci are normal in size for age without evidence of hydrocephalus. No extra-axial blood or fluid collections.    The brain parenchyma demonstrate mild chronic microvascular ischemic change in the supratentorial white matter. No mass lesion, acute hemorrhage, edema or acute infarct.    Normal vascular flow voids are preserved.      Skull/extracranial contents (limited evaluation): Bone marrow signal intensity is normal.                             CT Head Without Contrast (Final result)  Result time 01/14/18 06:57:56    Final result by Gayle Aquino MD (01/14/18 06:57:56)                 Impression:      1.  No CT evidence of acute intracranial abnormality. Clinical correlation and further evaluation as warrented.     2.  Mild generalized cerebral volume loss and microangiopathic change, similar to prior  "examination.      Electronically signed by: JUMANA CHACON  Date:     01/14/18  Time:    06:57              Narrative:    TECHNIQUE:  Multiple contiguous axial images of the brain were obtained from base to the vertex without the use of intravenous contrast. Sagittal and coronal reconstruction images were formatted in postprocessing.    COMPARISON:    Head CT 9/20/2017    FINDINGS:      There is no acute intracranial hemorrhage, hydrocephalus, midline shift or mass effect. There is mild generalized cerebral volume loss and mild bilateral deep cerebral white matter microangiopathic changes.  Gray-white matter differentiation otherwise is maintained. The basal cisterns are patent. The mastoid air cells and paranasal sinuses are clear of acute process. The visualized bones of the calvarium demonstrate no acute osseous abnormality.                              Estimated body mass index is 19.44 kg/m² as calculated from the following:    Height as of this encounter: 5' 5" (1.651 m).    Weight as of this encounter: 53 kg (116 lb 13.5 oz).    I & O (Last 24H):    Intake/Output Summary (Last 24 hours) at 01/16/18 1043  Last data filed at 01/16/18 0551   Gross per 24 hour   Intake              790 ml   Output              990 ml   Net             -200 ml       Estimated Creatinine Clearance: 18.5 mL/min (based on SCr of 2.3 mg/dL (H)).    ASSESSMENT/PLAN:     Active Problems:    Active Hospital Problems    Diagnosis  POA    *Hypertensive emergency [I16.1]Off Necardipine drip l likely rebound HTN from stopping clonidine.SBP 190s. does not want to take amlodipine due to history of pedal edema. BP - 194/60 this AM and noon improved with IV labetalol. received IV labetalol x  2 yesterday. Not comfortable going home with fluctuation in Blood pressure. Hydralazine increased to 75mg q 8hrlyontinue irbesartan and carvedilol.started on torsemide   Yes    Aortic stenosis [I35.0]Moderate aortic stenosis, PABLITO = 0.93 cm2  Unknown    Cor " pulmonale, chronic [I27.81]Pulmonary hypertension. The estimated PA systolic pressure is 68 mmHg.   Unknown    Chronic kidney disease, stage IV (severe) [N18.4]  Unknown    Vertigo [R42]denies vertigo, only had lightheadedness. MRI and CT brain -No acute abnormality.  Yes    Dizzy [R42]resolved as above  Yes    Anemia associated with chronic renal failure [D63.1]Hb 8.9 . Normocytic   Yes    CKD (chronic kidney disease) [N18.9]4- Solitary kidney. seems to be at baseline, follows up with nephrology.Followed by Dr. Ruffin in nephrology  Yes    Hyperlipidemia [E78.5]on rosuvastatin and cholestyramine  Yes    CAD (coronary artery disease) [I25.10]Asymptomatic. continue  low dose ASA.    Chronic diastolic heart failure - Well compensated. Continue daily weights and torsemide 20mg qam.  PRN weight gain/leg swelling/SOB  Yes     Chronic    Lupus (systemic lupus erythematosus) [M32.9].Continue HCQ daily    Yes     Chronic     Early 1990's developed acute respiratory failure and spent weeks in ICU [facility]   Positive CARYN, SSB  Hx Rash, leukopenia,         Resolved Hospital Problems    Diagnosis Date Resolved POA   No resolved problems to display.         Disposition- Home    DVT prophylaxis addressed with: B/L SCD            Magdy Bella MD  Attending Staff Physician  Hospital Medicine  pager- [phone]  Spectral[employer]jy - [phone]

## 2018-01-16 NOTE — PLAN OF CARE
Problem: Patient Care Overview  Goal: Plan of Care Review  Outcome: Ongoing (interventions implemented as appropriate)  POC reviewed. Pt verbalizes understanding. 0600 vitals  HR 60s, labetalol administered x1 per order,  HR 60s; pt denies HA, dizziness. Remained free of trauma/injury/falls. AAOx4. See flowsheet for assessment. Will continue to monitor and reassess.

## 2018-01-16 NOTE — NURSING
MD notified of patient's /86, and HR 62.  Patient is asymptomatic and has no complaints at this time.  New orders were to give labatelol 10mg IV x1.  Will administer and continue to monitor.

## 2018-01-16 NOTE — TELEPHONE ENCOUNTER
Patient advised per voicemail digital HTN order placed and someone will contact her when to pickup blood pressure cuff.

## 2018-01-17 ENCOUNTER — OUTPATIENT CASE MANAGEMENT (OUTPATIENT)
Dept: ADMINISTRATIVE | Facility: OTHER | Age: 73
End: 2018-01-17

## 2018-01-17 LAB
ALBUMIN SERPL BCP-MCNC: 2.5 G/DL
ALP SERPL-CCNC: 70 U/L
ALT SERPL W/O P-5'-P-CCNC: 21 U/L
ANION GAP SERPL CALC-SCNC: 8 MMOL/L
AST SERPL-CCNC: 31 U/L
BASOPHILS # BLD AUTO: 0.03 K/UL
BASOPHILS NFR BLD: 0.8 %
BILIRUB SERPL-MCNC: 0.2 MG/DL
BUN SERPL-MCNC: 60 MG/DL
CALCIUM SERPL-MCNC: 8.5 MG/DL
CHLORIDE SERPL-SCNC: 106 MMOL/L
CO2 SERPL-SCNC: 25 MMOL/L
CREAT SERPL-MCNC: 2.4 MG/DL
DIFFERENTIAL METHOD: ABNORMAL
EOSINOPHIL # BLD AUTO: 0.1 K/UL
EOSINOPHIL NFR BLD: 2.5 %
ERYTHROCYTE [DISTWIDTH] IN BLOOD BY AUTOMATED COUNT: 14 %
EST. GFR  (AFRICAN AMERICAN): 22.6 ML/MIN/1.73 M^2
EST. GFR  (NON AFRICAN AMERICAN): 19.6 ML/MIN/1.73 M^2
GLUCOSE SERPL-MCNC: 94 MG/DL
HCT VFR BLD AUTO: 28.9 %
HGB BLD-MCNC: 8.9 G/DL
IMM GRANULOCYTES # BLD AUTO: 0 K/UL
IMM GRANULOCYTES NFR BLD AUTO: 0 %
LYMPHOCYTES # BLD AUTO: 1.2 K/UL
LYMPHOCYTES NFR BLD: 33 %
MAGNESIUM SERPL-MCNC: 1.6 MG/DL
MCH RBC QN AUTO: 24.8 PG
MCHC RBC AUTO-ENTMCNC: 30.8 G/DL
MCV RBC AUTO: 81 FL
MONOCYTES # BLD AUTO: 0.7 K/UL
MONOCYTES NFR BLD: 20.3 %
NEUTROPHILS # BLD AUTO: 1.6 K/UL
NEUTROPHILS NFR BLD: 43.4 %
NRBC BLD-RTO: 0 /100 WBC
PHOSPHATE SERPL-MCNC: 4.6 MG/DL
PLATELET # BLD AUTO: 169 K/UL
PMV BLD AUTO: 11.1 FL
POTASSIUM SERPL-SCNC: 4 MMOL/L
PROT SERPL-MCNC: 6.3 G/DL
RBC # BLD AUTO: 3.59 M/UL
SODIUM SERPL-SCNC: 139 MMOL/L
WBC # BLD AUTO: 3.64 K/UL

## 2018-01-17 PROCEDURE — 25000003 PHARM REV CODE 250: Performed by: STUDENT IN AN ORGANIZED HEALTH CARE EDUCATION/TRAINING PROGRAM

## 2018-01-17 PROCEDURE — 99233 SBSQ HOSP IP/OBS HIGH 50: CPT | Mod: ,,, | Performed by: INTERNAL MEDICINE

## 2018-01-17 PROCEDURE — 83735 ASSAY OF MAGNESIUM: CPT

## 2018-01-17 PROCEDURE — 80053 COMPREHEN METABOLIC PANEL: CPT

## 2018-01-17 PROCEDURE — 25000003 PHARM REV CODE 250: Performed by: PHYSICIAN ASSISTANT

## 2018-01-17 PROCEDURE — 25000003 PHARM REV CODE 250: Performed by: HOSPITALIST

## 2018-01-17 PROCEDURE — 85025 COMPLETE CBC W/AUTO DIFF WBC: CPT

## 2018-01-17 PROCEDURE — 11000001 HC ACUTE MED/SURG PRIVATE ROOM

## 2018-01-17 PROCEDURE — 25000003 PHARM REV CODE 250: Performed by: INTERNAL MEDICINE

## 2018-01-17 PROCEDURE — 84100 ASSAY OF PHOSPHORUS: CPT

## 2018-01-17 PROCEDURE — 25000003 PHARM REV CODE 250: Performed by: NURSE PRACTITIONER

## 2018-01-17 PROCEDURE — 36415 COLL VENOUS BLD VENIPUNCTURE: CPT

## 2018-01-17 RX ORDER — ACETAMINOPHEN 325 MG/1
650 TABLET ORAL EVERY 6 HOURS PRN
Status: DISCONTINUED | OUTPATIENT
Start: 2018-01-17 | End: 2018-01-26 | Stop reason: HOSPADM

## 2018-01-17 RX ORDER — HYDRALAZINE HYDROCHLORIDE 50 MG/1
100 TABLET, FILM COATED ORAL EVERY 8 HOURS
Status: DISCONTINUED | OUTPATIENT
Start: 2018-01-17 | End: 2018-01-22

## 2018-01-17 RX ORDER — CARVEDILOL 25 MG/1
25 TABLET ORAL 2 TIMES DAILY WITH MEALS
Status: DISCONTINUED | OUTPATIENT
Start: 2018-01-17 | End: 2018-01-22

## 2018-01-17 RX ORDER — CLONIDINE HYDROCHLORIDE 0.1 MG/1
0.1 TABLET ORAL EVERY 6 HOURS PRN
Status: DISCONTINUED | OUTPATIENT
Start: 2018-01-17 | End: 2018-01-18

## 2018-01-17 RX ADMIN — CARVEDILOL 25 MG: 25 TABLET, FILM COATED ORAL at 05:01

## 2018-01-17 RX ADMIN — HYDROXYCHLOROQUINE SULFATE 200 MG: 200 TABLET, FILM COATED ORAL at 08:01

## 2018-01-17 RX ADMIN — IRBESARTAN 300 MG: 300 TABLET ORAL at 09:01

## 2018-01-17 RX ADMIN — HYDRALAZINE HYDROCHLORIDE 100 MG: 50 TABLET ORAL at 02:01

## 2018-01-17 RX ADMIN — CLONIDINE HYDROCHLORIDE 0.1 MG: 0.1 TABLET ORAL at 07:01

## 2018-01-17 RX ADMIN — CARVEDILOL 12.5 MG: 12.5 TABLET, FILM COATED ORAL at 08:01

## 2018-01-17 RX ADMIN — CITALOPRAM HYDROBROMIDE 20 MG: 20 TABLET ORAL at 08:01

## 2018-01-17 RX ADMIN — CHOLESTYRAMINE 4 G: 4 POWDER, FOR SUSPENSION ORAL at 08:01

## 2018-01-17 RX ADMIN — CHOLESTYRAMINE 4 G: 4 POWDER, FOR SUSPENSION ORAL at 05:01

## 2018-01-17 RX ADMIN — LATANOPROST 1 DROP: 50 SOLUTION OPHTHALMIC at 08:01

## 2018-01-17 RX ADMIN — ACETAMINOPHEN 650 MG: 325 TABLET ORAL at 11:01

## 2018-01-17 RX ADMIN — CLONIDINE HYDROCHLORIDE 0.1 MG: 0.1 TABLET ORAL at 10:01

## 2018-01-17 RX ADMIN — TORSEMIDE 20 MG: 20 TABLET ORAL at 08:01

## 2018-01-17 RX ADMIN — CHOLESTYRAMINE 4 G: 4 POWDER, FOR SUSPENSION ORAL at 11:01

## 2018-01-17 RX ADMIN — HYDRALAZINE HYDROCHLORIDE 100 MG: 50 TABLET ORAL at 09:01

## 2018-01-17 RX ADMIN — CLONIDINE HYDROCHLORIDE 0.1 MG: 0.1 TABLET ORAL at 12:01

## 2018-01-17 RX ADMIN — LABETALOL HYDROCHLORIDE 10 MG: 5 INJECTION INTRAVENOUS at 01:01

## 2018-01-17 RX ADMIN — HYDROXYCHLOROQUINE SULFATE 200 MG: 200 TABLET, FILM COATED ORAL at 09:01

## 2018-01-17 RX ADMIN — ROSUVASTATIN CALCIUM 40 MG: 20 TABLET, FILM COATED ORAL at 09:01

## 2018-01-17 RX ADMIN — LEVOTHYROXINE SODIUM 75 MCG: 75 TABLET ORAL at 05:01

## 2018-01-17 RX ADMIN — HYDRALAZINE HYDROCHLORIDE 75 MG: 50 TABLET ORAL at 05:01

## 2018-01-17 RX ADMIN — ASPIRIN 81 MG: 81 TABLET, COATED ORAL at 08:01

## 2018-01-17 RX ADMIN — PANTOPRAZOLE SODIUM 40 MG: 40 TABLET, DELAYED RELEASE ORAL at 08:01

## 2018-01-17 NOTE — NURSING
Notified IMB team of continued elevated /88, HR67. Order to give hydralazine 75mg po dose now. Notified Gloria THAYER of order.

## 2018-01-17 NOTE — PLAN OF CARE
Problem: Patient Care Overview  Goal: Plan of Care Review  Outcome: Ongoing (interventions implemented as appropriate)  Greeted patient and gained consent for nursing care. Alert and oriented. No complaint of pain. Complained of headache at noon, gave PRN tylenol. Monitored Bp. Assisted in her needs. Safety of patient maintained. Will continue to monitor.

## 2018-01-17 NOTE — PROGRESS NOTES
[facility] Hospital Medicine  Progress Note    Patient Name: Ewelina Arnold  MRN: [id number]  Patient Class: IP- Inpatient   Admission Date: 1/14/2018  Length of Stay: 3 days  Attending Physician: Emeka Martínez MD  Primary Care Provider: Kalie Walton MD    [facility] Medicine Team: [facility] B Emeka Martínez MD    Subjective:     Principal Problem:Hypertensive emergency    HPI:  No notes on file    Hospital Course:  No notes on file    Past Medical History:   Diagnosis Date    Acute on chronic diastolic congestive heart failure 11/17/2017    Allergy     Anatomical narrow angle glaucoma     Anemia     States diagnosed about a month ago    Aortic atherosclerosis     Arthritis     Cataract     CHF (congestive heart failure)     Chronic kidney disease     Chronic sciatica of left side     Right lower back pain due to sciatica    Coronary artery disease     Depression     Dry eyes     GERD (gastroesophageal reflux disease)     History of colon cancer     Hyperlipidemia     Hypertension     Hypothyroidism     Left ventricular diastolic dysfunction with preserved systolic function     Lupus (systemic lupus erythematosus) 8/17/2012    Malnutrition 5/16/2017    Osteoarthritis of cervical spine 8/17/2012    Osteopenia     PAD (peripheral artery disease)        Past Surgical History:   Procedure Laterality Date    CARDIAC CATHETERIZATION  07/27/2011    x1    CHOLECYSTECTOMY  1999    COLON SURGERY  2000 & 2003    partial removal    COLONOSCOPY N/A 4/14/2016    Procedure: COLONOSCOPY;  Surgeon: Jose Steen MD;  Location: [facility] ([address] [place] [address]);  Service: Endoscopy;  Laterality: N/A;  prep 2 days prior light meals only/clear liquid day before  and 4 dulcolax tabs (5 mgs) at noon    COLONOSCOPY N/A 9/14/2017    Procedure: COLONOSCOPY;  Surgeon: Jose Steen MD;  Location: [facility] ([address] [place] [address]);  Service: Endoscopy;  Laterality: N/A;    EYE SURGERY      HAND SURGERY  Bilateral 1996 & 1997    due to carpal tunnel     HERNIA REPAIR      HYSTERECTOMY  1983    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    Arthrotec 50 [diclofenac-misoprostol]      Other reaction(s): Unknown    Bentyl [dicyclomine]      Not effective    Doxycycline      nausea    Imdur [isosorbide mononitrate] Nausea Only    Lomotil [diphenoxylate-atropine]      Stomach cramps, pt vomitted    Lozol [indapamide] Rash    Tramadol Nausea Only       Family History     Problem Relation (Age of Onset)    Diabetes Maternal Grandmother, Son, Maternal Aunt    Heart attack Mother    Heart disease Father    Hypertension Mother, Father, Sister, Brother    Kidney disease Brother    Kidney failure Brother    No Known Problems Daughter, Maternal Grandfather, Paternal Grandmother, Paternal Grandfather        Social History Main Topics    Smoking status: Former Smoker     Packs/day: 1.50     Years: 30.00     Types: Cigarettes     Start date: 1955     Quit date: 3/13/1985    Smokeless tobacco: Never Used    Alcohol use No    Drug use: No    Sexual activity: Not Currently     Partners: Male     Birth control/ protection: Abstinence      Review of Systems   Constitutional: Negative for activity change, appetite change, chills, fatigue, fever and unexpected weight change.   HENT: Positive for congestion. Negative for ear discharge and rhinorrhea.    Eyes: Negative for photophobia, redness and visual disturbance.   Respiratory:  Negative for apnea, cough, choking, shortness of breath and wheezing.    Cardiovascular: Negative for chest pain, palpitations and leg swelling.   Gastrointestinal: Positive for vomiting. Negative for abdominal distention, abdominal pain, constipation and nausea.   Endocrine: Negative for polydipsia, polyphagia and polyuria.   Genitourinary: Negative for dysuria, frequency and hematuria.   Musculoskeletal: Negative for arthralgias, back pain, gait problem, joint swelling and myalgias.   Skin: Negative for color change, pallor, rash and wound.   Allergic/Immunologic: Negative for immunocompromised state.   Neurological: Positive for dizziness and headaches. Negative for seizures, speech difficulty, weakness and numbness.   Hematological: Negative for adenopathy. Does not bruise/bleed easily.   Psychiatric/Behavioral: Negative for agitation, behavioral problems, confusion and decreased concentration.   All other systems reviewed and are negative.    Objective:     Vital Signs (Most Recent):  Temp: 98.2 °F (36.8 °C) (01/17/18 0826)  Pulse: 61 (01/17/18 0826)  Resp: 17 (01/17/18 0826)  BP: (!) 192/87 (01/17/18 0826)  SpO2: 96 % (01/17/18 0826) Vital Signs (24h Range):  Temp:  [97.8 °F (36.6 °C)-98.2 °F (36.8 °C)] 98.2 °F (36.8 °C)  Pulse:  [59-67] 61  Resp:  [16-17] 17  SpO2:  [92 %-98 %] 96 %  BP: (147-218)/(66-88) 192/87   Weight: 53 kg (116 lb 13.5 oz)  Body mass index is 19.44 kg/m².      Intake/Output Summary (Last 24 hours) at 01/17/18 1014  Last data filed at 01/17/18 0559   Gross per 24 hour   Intake              240 ml   Output              600 ml   Net             -360 ml       Physical Exam   Constitutional: She is oriented to person, place, and time. She appears well-developed and well-nourished. No distress.   HENT:   Head: Normocephalic and atraumatic.   Eyes: EOM are normal. Pupils are equal, round, and reactive to light. Right eye exhibits no discharge. Left eye exhibits no discharge.   Neck: Normal  range of motion. Neck supple. No JVD present.   Cardiovascular: Normal rate, regular rhythm and normal heart sounds.  Exam reveals no gallop and no friction rub.    No murmur heard.  175/80   Pulmonary/Chest: Effort normal and breath sounds normal. No respiratory distress. She has no wheezes.   Abdominal: Soft. Bowel sounds are normal. She exhibits no distension and no mass. There is no tenderness. There is no guarding.   Musculoskeletal: Normal range of motion. She exhibits no edema or deformity.   Neurological: She is alert and oriented to person, place, and time.   Skin: Skin is warm and dry. She is not diaphoretic. No erythema. No pallor.   Psychiatric: She has a normal mood and affect. Her behavior is normal.   Nursing note and vitals reviewed.      Vents:     Lines/Drains/Airways     Peripheral Intravenous Line                 Peripheral IV - Single Lumen 01/17/18 0310 Left Wrist less than 1 day              Significant Labs:    CBC/Anemia Profile:    Recent Labs  Lab 01/16/18  0443 01/17/18  0357   WBC 4.26 3.64*   HGB 9.2* 8.9*   HCT 29.0* 28.9*    169   MCV 80* 81*   RDW 14.0 14.0        Chemistries:    Recent Labs  Lab 01/16/18  0443 01/17/18  0357    139   K 3.9 4.0    106   CO2 26 25   BUN 63* 60*   CREATININE 2.3* 2.4*   CALCIUM 8.4* 8.5*   ALBUMIN 2.6* 2.5*   PROT 6.6 6.3   BILITOT 0.2 0.2   ALKPHOS 73 70   ALT 27 21   AST 45* 31   MG 1.7 1.6   PHOS 5.1* 4.6*              Blood Culture: No results for input(s): LABBLOO in the last 48 hours.  Lactic Acid: No results for input(s): LACTATE in the last 48 hours.    Significant Imaging: I have reviewed all pertinent imaging results/findings within the past 24 hours.     1.  No CT evidence of acute intracranial abnormality. Clinical correlation and further evaluation as warrented.     2.  Mild generalized cerebral volume loss and microangiopathic change, similar to prior examination    Assessment/Plan:      Anemia associated with chronic  renal failure    Stable          Hypertension    Difficult to lower  Meds are at maximum  Consider minoxidil            VTE Risk Mitigation         Ordered     Medium Risk of VTE  Once      01/14/18 0814              Emeka Martínez MD  Department of Hospital Medicine   [facility]

## 2018-01-17 NOTE — SUBJECTIVE & OBJECTIVE
Past Medical History:   Diagnosis Date    Acute on chronic diastolic congestive heart failure 11/17/2017    Allergy     Anatomical narrow angle glaucoma     Anemia     States diagnosed about a month ago    Aortic atherosclerosis     Arthritis     Cataract     CHF (congestive heart failure)     Chronic kidney disease     Chronic sciatica of left side     Right lower back pain due to sciatica    Coronary artery disease     Depression     Dry eyes     GERD (gastroesophageal reflux disease)     History of colon cancer     Hyperlipidemia     Hypertension     Hypothyroidism     Left ventricular diastolic dysfunction with preserved systolic function     Lupus (systemic lupus erythematosus) 8/17/2012    Malnutrition 5/16/2017    Osteoarthritis of cervical spine 8/17/2012    Osteopenia     PAD (peripheral artery disease)        Past Surgical History:   Procedure Laterality Date    CARDIAC CATHETERIZATION  07/27/2011    x1    CHOLECYSTECTOMY  1999    COLON SURGERY  2000 & 2003    partial removal    COLONOSCOPY N/A 4/14/2016    Procedure: COLONOSCOPY;  Surgeon: Jose Steen MD;  Location: [facility] ([address] [place] [address]);  Service: Endoscopy;  Laterality: N/A;  prep 2 days prior light meals only/clear liquid day before  and 4 dulcolax tabs (5 mgs) at noon    COLONOSCOPY N/A 9/14/2017    Procedure: COLONOSCOPY;  Surgeon: Jose Steen MD;  Location: 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    Arthrotec 50 [diclofenac-misoprostol]      Other reaction(s): Unknown    Bentyl [dicyclomine]      Not effective    Doxycycline      nausea    Imdur [isosorbide mononitrate] Nausea Only    Lomotil [diphenoxylate-atropine]      Stomach cramps, pt vomitted    Lozol [indapamide] Rash    Tramadol Nausea Only       Family History     Problem Relation (Age of Onset)    Diabetes Maternal Grandmother, Son, Maternal Aunt    Heart attack Mother    Heart disease Father    Hypertension Mother, Father, Sister, Brother    Kidney disease Brother    Kidney failure Brother    No Known Problems Daughter, Maternal Grandfather, Paternal Grandmother, Paternal Grandfather        Social History Main Topics    Smoking status: Former Smoker     Packs/day: 1.50     Years: 30.00     Types: Cigarettes     Start date: 1955     Quit date: 3/13/1985    Smokeless tobacco: Never Used    Alcohol use No    Drug use: No    Sexual activity: Not Currently     Partners: Male     Birth control/ protection: Abstinence      Review of Systems   Constitutional: Negative for activity change, appetite change, chills, fatigue, fever and unexpected weight change.   HENT: Positive for congestion. Negative for ear discharge and rhinorrhea.    Eyes: Negative for photophobia, redness and visual disturbance.   Respiratory: Negative for apnea, cough, choking, shortness of breath and wheezing.    Cardiovascular: Negative for chest pain, palpitations and leg swelling.   Gastrointestinal: Positive for vomiting. Negative for abdominal distention, abdominal pain, constipation and nausea.   Endocrine: Negative for polydipsia, polyphagia and polyuria.   Genitourinary: Negative for dysuria, frequency and hematuria.   Musculoskeletal: Negative for arthralgias, back pain, gait problem, joint  swelling and myalgias.   Skin: Negative for color change, pallor, rash and wound.   Allergic/Immunologic: Negative for immunocompromised state.   Neurological: Positive for dizziness and headaches. Negative for seizures, speech difficulty, weakness and numbness.   Hematological: Negative for adenopathy. Does not bruise/bleed easily.   Psychiatric/Behavioral: Negative for agitation, behavioral problems, confusion and decreased concentration.   All other systems reviewed and are negative.    Objective:     Vital Signs (Most Recent):  Temp: 98.2 °F (36.8 °C) (01/17/18 0826)  Pulse: 61 (01/17/18 0826)  Resp: 17 (01/17/18 0826)  BP: (!) 192/87 (01/17/18 0826)  SpO2: 96 % (01/17/18 0826) Vital Signs (24h Range):  Temp:  [97.8 °F (36.6 °C)-98.2 °F (36.8 °C)] 98.2 °F (36.8 °C)  Pulse:  [59-67] 61  Resp:  [16-17] 17  SpO2:  [92 %-98 %] 96 %  BP: (147-218)/(66-88) 192/87   Weight: 53 kg (116 lb 13.5 oz)  Body mass index is 19.44 kg/m².      Intake/Output Summary (Last 24 hours) at 01/17/18 1014  Last data filed at 01/17/18 0559   Gross per 24 hour   Intake              240 ml   Output              600 ml   Net             -360 ml       Physical Exam   Constitutional: She is oriented to person, place, and time. She appears well-developed and well-nourished. No distress.   HENT:   Head: Normocephalic and atraumatic.   Eyes: EOM are normal. Pupils are equal, round, and reactive to light. Right eye exhibits no discharge. Left eye exhibits no discharge.   Neck: Normal range of motion. Neck supple. No JVD present.   Cardiovascular: Normal rate, regular rhythm and normal heart sounds.  Exam reveals no gallop and no friction rub.    No murmur heard.  175/80   Pulmonary/Chest: Effort normal and breath sounds normal. No respiratory distress. She has no wheezes.   Abdominal: Soft. Bowel sounds are normal. She exhibits no distension and no mass. There is no tenderness. There is no guarding.   Musculoskeletal: Normal range of motion. She  exhibits no edema or deformity.   Neurological: She is alert and oriented to person, place, and time.   Skin: Skin is warm and dry. She is not diaphoretic. No erythema. No pallor.   Psychiatric: She has a normal mood and affect. Her behavior is normal.   Nursing note and vitals reviewed.      Vents:     Lines/Drains/Airways     Peripheral Intravenous Line                 Peripheral IV - Single Lumen 01/17/18 0310 Left Wrist less than 1 day              Significant Labs:    CBC/Anemia Profile:    Recent Labs  Lab 01/16/18 0443 01/17/18  0357   WBC 4.26 3.64*   HGB 9.2* 8.9*   HCT 29.0* 28.9*    169   MCV 80* 81*   RDW 14.0 14.0        Chemistries:    Recent Labs  Lab 01/16/18 0443 01/17/18  0357    139   K 3.9 4.0    106   CO2 26 25   BUN 63* 60*   CREATININE 2.3* 2.4*   CALCIUM 8.4* 8.5*   ALBUMIN 2.6* 2.5*   PROT 6.6 6.3   BILITOT 0.2 0.2   ALKPHOS 73 70   ALT 27 21   AST 45* 31   MG 1.7 1.6   PHOS 5.1* 4.6*              Blood Culture: No results for input(s): LABBLOO in the last 48 hours.  Lactic Acid: No results for input(s): LACTATE in the last 48 hours.    Significant Imaging: I have reviewed all pertinent imaging results/findings within the past 24 hours.     1.  No CT evidence of acute intracranial abnormality. Clinical correlation and further evaluation as warrented.     2.  Mild generalized cerebral volume loss and microangiopathic change, similar to prior examination

## 2018-01-17 NOTE — NURSING
Notified MD of pt BP 200s/80s HR 60. Administered labetalol x1. /88 HR 60. Clonidine 0.1mg ordered. Will administer and continue to monitor.

## 2018-01-17 NOTE — PROGRESS NOTES
1/17/17 chart reviewed in epic and noted that pt went back to urgent care for hypertension and was advised to start clonidine which she did and woke up with worsening dizziness.  She called on call nurse and was advised to come to hospital.  Pt was evaluated in ed and was admitted for hypertension management.    Noted orders from dr. castle for the digital hypertension program but the banner on chart is not reset it still states declined.  Call to pt cell phone and she states she is feeling better.  Asked if she saw the communication in [facility] for the digital hypertension program and she admits that she has not been on [employer] in a while. Advised that she should have a questionnaire similar to the chf program and she will need to complete to get into the program.  She verbalized understanding.    She states she has her grandson who is checking on her home while she is in the hospital and no problems with freezing pipes noted at this time.  Advised pt that I will call her next week to follow up on status of the digital hypertension questionnaire was in the [facility]    Progress towards goals:      LT goal 1  Patient/caregiver will accept life style changes to manage and improve symptoms of chf prior to discharge from [facility]. - Priority: high  Short Term Goals  1.  Patient/caregiver will measure and record weights daily and notify doctor if patient gains more than 3 pounds in one day or 5 pounds in one week within 1 month.  Partially met            Pt was denies being off her sodium restricted diet                                                                                                    2.  Patient/Caregiver will verbalize importance of early intervention for symptoms of CHF and verbalize 2 ways of preventing complications due to disease process within 1 month       Partially met   Recognize and provide educational material (BRANDON).  Low - Salt Choices, Managing your Heart failure. Taking medicine  to Control Heart Failure.    As above dr. castle placed the order for the digital hypertension program and pt will check [facility] after discharge to home    LT goal 2   Patient/caregiver will have knowledge of resources/benefits available from [employer] managed care policy in order to obtain the services/benefits that patient is eligible for or needs prior to discharge from [facility].    Short Term Goals  1.  Patient/caregiver will verbalize understanding of appropriate use of [employer] health care benefits within 2 months.   In progress         Patient states she received her meals from [employer] last week  Sodium content is only 426 per serving.  Pt states her new cardiac plus plan is going into effect on jan 1 2018 and this will be a big help for her copay reduction with all the specialist she has to see  Pt states she got her papers from [employer] and she has faxed them to pcp for signature and verification of chronic condition.    Plan   Follow up on receipt of the digital hypertension program questionnaire   Continue to review the information mailed to pt    Follow up on wt and blood pressure  Follow up on start of walking on treadmill       Clinical Reference Documents Added to Patient Instructions       Document    DIET, DISCHARGE INSTRUCTIONS FOR EATING A LOW-SALT  (ENGLISH)    HEART FAILURE, CONGESTIVE (CHF) (ENGLISH)    HEART FAILURE, DISCHARGE INSTRUCTIONS FOR (ENGLISH)    HEART FAILURE: BEING ACTIVE (ENGLISH)    HEART FAILURE: MAKING CHANGES TO YOUR DIET (ENGLISH)    HEART FAILURE: TRACKING YOUR WEIGHT (ENGLISH)    HEART FAILURE: WARNING SIGNS OF A FLARE-UP (ENGLISH)    LOW-SALT CHOICES (ENGLISH)    MY HEART FAILURE SYMPTOMS CHART (ENGLISH)    SALT, TIPS FOR USING LESS (ENGLISH)

## 2018-01-17 NOTE — PLAN OF CARE
Rounding with Dr. Martínez and patient upset about many things during her current stay in hospital. She has written list of issues that she would like heard. Call placed to patient relations and left VM.

## 2018-01-18 LAB
ALBUMIN SERPL BCP-MCNC: 2.6 G/DL
ALP SERPL-CCNC: 71 U/L
ALT SERPL W/O P-5'-P-CCNC: 18 U/L
ANION GAP SERPL CALC-SCNC: 12 MMOL/L
AST SERPL-CCNC: 28 U/L
BASOPHILS # BLD AUTO: 0.02 K/UL
BASOPHILS NFR BLD: 0.5 %
BILIRUB SERPL-MCNC: 0.2 MG/DL
BUN SERPL-MCNC: 56 MG/DL
CALCIUM SERPL-MCNC: 8.9 MG/DL
CHLORIDE SERPL-SCNC: 107 MMOL/L
CO2 SERPL-SCNC: 19 MMOL/L
CREAT SERPL-MCNC: 2 MG/DL
DIFFERENTIAL METHOD: ABNORMAL
EOSINOPHIL # BLD AUTO: 0.2 K/UL
EOSINOPHIL NFR BLD: 4.2 %
ERYTHROCYTE [DISTWIDTH] IN BLOOD BY AUTOMATED COUNT: 13.9 %
EST. GFR  (AFRICAN AMERICAN): 28.1 ML/MIN/1.73 M^2
EST. GFR  (NON AFRICAN AMERICAN): 24.4 ML/MIN/1.73 M^2
GLUCOSE SERPL-MCNC: 83 MG/DL
HCT VFR BLD AUTO: 28.9 %
HGB BLD-MCNC: 8.8 G/DL
IMM GRANULOCYTES # BLD AUTO: 0.01 K/UL
IMM GRANULOCYTES NFR BLD AUTO: 0.3 %
LYMPHOCYTES # BLD AUTO: 1.3 K/UL
LYMPHOCYTES NFR BLD: 33.7 %
MAGNESIUM SERPL-MCNC: 1.7 MG/DL
MCH RBC QN AUTO: 25.5 PG
MCHC RBC AUTO-ENTMCNC: 30.4 G/DL
MCV RBC AUTO: 84 FL
MONOCYTES # BLD AUTO: 0.6 K/UL
MONOCYTES NFR BLD: 16.7 %
NEUTROPHILS # BLD AUTO: 1.7 K/UL
NEUTROPHILS NFR BLD: 44.6 %
NRBC BLD-RTO: 0 /100 WBC
PHOSPHATE SERPL-MCNC: 4.6 MG/DL
PLATELET # BLD AUTO: 172 K/UL
PMV BLD AUTO: 11.4 FL
POTASSIUM SERPL-SCNC: 4.2 MMOL/L
PROT SERPL-MCNC: 6.4 G/DL
RBC # BLD AUTO: 3.45 M/UL
SODIUM SERPL-SCNC: 138 MMOL/L
WBC # BLD AUTO: 3.83 K/UL

## 2018-01-18 PROCEDURE — 25000003 PHARM REV CODE 250: Performed by: INTERNAL MEDICINE

## 2018-01-18 PROCEDURE — 25000003 PHARM REV CODE 250: Performed by: STUDENT IN AN ORGANIZED HEALTH CARE EDUCATION/TRAINING PROGRAM

## 2018-01-18 PROCEDURE — 25000003 PHARM REV CODE 250: Performed by: NURSE PRACTITIONER

## 2018-01-18 PROCEDURE — 85025 COMPLETE CBC W/AUTO DIFF WBC: CPT

## 2018-01-18 PROCEDURE — 36415 COLL VENOUS BLD VENIPUNCTURE: CPT

## 2018-01-18 PROCEDURE — 99233 SBSQ HOSP IP/OBS HIGH 50: CPT | Mod: ,,, | Performed by: INTERNAL MEDICINE

## 2018-01-18 PROCEDURE — 84100 ASSAY OF PHOSPHORUS: CPT

## 2018-01-18 PROCEDURE — 11000001 HC ACUTE MED/SURG PRIVATE ROOM

## 2018-01-18 PROCEDURE — 80053 COMPREHEN METABOLIC PANEL: CPT

## 2018-01-18 PROCEDURE — 83735 ASSAY OF MAGNESIUM: CPT

## 2018-01-18 PROCEDURE — 25000003 PHARM REV CODE 250: Performed by: PHYSICIAN ASSISTANT

## 2018-01-18 PROCEDURE — 25000003 PHARM REV CODE 250: Performed by: HOSPITALIST

## 2018-01-18 RX ORDER — LABETALOL HYDROCHLORIDE 5 MG/ML
10 INJECTION, SOLUTION INTRAVENOUS EVERY 4 HOURS PRN
Status: DISCONTINUED | OUTPATIENT
Start: 2018-01-18 | End: 2018-01-26 | Stop reason: HOSPADM

## 2018-01-18 RX ORDER — CLONIDINE HYDROCHLORIDE 0.1 MG/1
0.1 TABLET ORAL ONCE
Status: DISCONTINUED | OUTPATIENT
Start: 2018-01-19 | End: 2018-01-23

## 2018-01-18 RX ORDER — CLONIDINE HYDROCHLORIDE 0.1 MG/1
0.1 TABLET ORAL 2 TIMES DAILY
Status: DISCONTINUED | OUTPATIENT
Start: 2018-01-18 | End: 2018-01-19

## 2018-01-18 RX ADMIN — HYDRALAZINE HYDROCHLORIDE 100 MG: 50 TABLET ORAL at 01:01

## 2018-01-18 RX ADMIN — HYDROXYCHLOROQUINE SULFATE 200 MG: 200 TABLET, FILM COATED ORAL at 08:01

## 2018-01-18 RX ADMIN — ROSUVASTATIN CALCIUM 40 MG: 20 TABLET, FILM COATED ORAL at 08:01

## 2018-01-18 RX ADMIN — ACETAMINOPHEN 650 MG: 325 TABLET ORAL at 02:01

## 2018-01-18 RX ADMIN — CLONIDINE HYDROCHLORIDE 0.1 MG: 0.1 TABLET ORAL at 12:01

## 2018-01-18 RX ADMIN — LABETALOL HYDROCHLORIDE 10 MG: 5 INJECTION INTRAVENOUS at 09:01

## 2018-01-18 RX ADMIN — TORSEMIDE 20 MG: 20 TABLET ORAL at 08:01

## 2018-01-18 RX ADMIN — CARVEDILOL 25 MG: 25 TABLET, FILM COATED ORAL at 08:01

## 2018-01-18 RX ADMIN — ACETAMINOPHEN 650 MG: 325 TABLET ORAL at 08:01

## 2018-01-18 RX ADMIN — LABETALOL HYDROCHLORIDE 10 MG: 5 INJECTION INTRAVENOUS at 02:01

## 2018-01-18 RX ADMIN — IRBESARTAN 300 MG: 300 TABLET ORAL at 08:01

## 2018-01-18 RX ADMIN — CLONIDINE HYDROCHLORIDE 0.1 MG: 0.1 TABLET ORAL at 03:01

## 2018-01-18 RX ADMIN — CHOLESTYRAMINE 4 G: 4 POWDER, FOR SUSPENSION ORAL at 05:01

## 2018-01-18 RX ADMIN — LATANOPROST 1 DROP: 50 SOLUTION OPHTHALMIC at 08:01

## 2018-01-18 RX ADMIN — CHOLESTYRAMINE 4 G: 4 POWDER, FOR SUSPENSION ORAL at 08:01

## 2018-01-18 RX ADMIN — CITALOPRAM HYDROBROMIDE 20 MG: 20 TABLET ORAL at 08:01

## 2018-01-18 RX ADMIN — HYDRALAZINE HYDROCHLORIDE 100 MG: 50 TABLET ORAL at 05:01

## 2018-01-18 RX ADMIN — LEVOTHYROXINE SODIUM 75 MCG: 75 TABLET ORAL at 05:01

## 2018-01-18 RX ADMIN — ASPIRIN 81 MG: 81 TABLET, COATED ORAL at 08:01

## 2018-01-18 RX ADMIN — CARVEDILOL 25 MG: 25 TABLET, FILM COATED ORAL at 05:01

## 2018-01-18 RX ADMIN — CLONIDINE HYDROCHLORIDE 0.1 MG: 0.1 TABLET ORAL at 05:01

## 2018-01-18 RX ADMIN — CHOLESTYRAMINE 4 G: 4 POWDER, FOR SUSPENSION ORAL at 11:01

## 2018-01-18 RX ADMIN — HYDRALAZINE HYDROCHLORIDE 100 MG: 50 TABLET ORAL at 09:01

## 2018-01-18 RX ADMIN — ACETAMINOPHEN 650 MG: 325 TABLET ORAL at 11:01

## 2018-01-18 NOTE — PROGRESS NOTES
[facility] Hospital Medicine  Progress Note    Patient Name: Ewelina Arnold  MRN: [id number]  Patient Class: IP- Inpatient   Admission Date: 1/14/2018  Length of Stay: 4 days  Attending Physician: Emeka Martínez MD  Primary Care Provider: Kalie Walton MD    [facility] Medicine Team: [facility] HOSP MED B Emeka Martínez MD    Subjective:     Principal Problem:Hypertensive emergency    HPI:  No notes on file    Hospital Course:  No notes on file    Past Medical History:   Diagnosis Date    Acute on chronic diastolic congestive heart failure 11/17/2017    Allergy     Anatomical narrow angle glaucoma     Anemia     States diagnosed about a month ago    Aortic atherosclerosis     Arthritis     Cataract     CHF (congestive heart failure)     Chronic kidney disease     Chronic sciatica of left side     Right lower back pain due to sciatica    Coronary artery disease     Depression     Dry eyes     GERD (gastroesophageal reflux disease)     History of colon cancer     Hyperlipidemia     Hypertension     Hypothyroidism     Left ventricular diastolic dysfunction with preserved systolic function     Lupus (systemic lupus erythematosus) 8/17/2012    Malnutrition 5/16/2017    Osteoarthritis of cervical spine 8/17/2012    Osteopenia     PAD (peripheral artery disease)        Past Surgical History:   Procedure Laterality Date    CARDIAC CATHETERIZATION  07/27/2011    x1    CHOLECYSTECTOMY  1999    COLON SURGERY  2000 & 2003    partial removal    COLONOSCOPY N/A 4/14/2016    Procedure: COLONOSCOPY;  Surgeon: Jose Steen MD;  Location: [facility] ([address] [place] [address]);  Service: Endoscopy;  Laterality: N/A;  prep 2 days prior light meals only/clear liquid day before  and 4 dulcolax tabs (5 mgs) at noon    COLONOSCOPY N/A 9/14/2017    Procedure: COLONOSCOPY;  Surgeon: Jose Steen MD;  Location: [facility] ([address] [place] [address]);  Service: Endoscopy;  Laterality: N/A;    EYE SURGERY      HAND SURGERY  Bilateral 1996 & 1997    due to carpal tunnel     HERNIA REPAIR      HYSTERECTOMY  1983    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    Arthrotec 50 [diclofenac-misoprostol]      Other reaction(s): Unknown    Bentyl [dicyclomine]      Not effective    Doxycycline      nausea    Imdur [isosorbide mononitrate] Nausea Only    Lomotil [diphenoxylate-atropine]      Stomach cramps, pt vomitted    Lozol [indapamide] Rash    Tramadol Nausea Only       Family History     Problem Relation (Age of Onset)    Diabetes Maternal Grandmother, Son, Maternal Aunt    Heart attack Mother    Heart disease Father    Hypertension Mother, Father, Sister, Brother    Kidney disease Brother    Kidney failure Brother    No Known Problems Daughter, Maternal Grandfather, Paternal Grandmother, Paternal Grandfather        Social History Main Topics    Smoking status: Former Smoker     Packs/day: 1.50     Years: 30.00     Types: Cigarettes     Start date: 1955     Quit date: 3/13/1985    Smokeless tobacco: Never Used    Alcohol use No    Drug use: No    Sexual activity: Not Currently     Partners: Male     Birth control/ protection: Abstinence      Review of Systems   Constitutional: Negative for activity change, appetite change, chills, fatigue, fever and unexpected weight change.   HENT: Positive for congestion. Negative for ear discharge and rhinorrhea.    Eyes: Negative for photophobia, redness and visual disturbance.   Respiratory:  Negative for apnea, cough, choking, shortness of breath and wheezing.    Cardiovascular: Negative for chest pain, palpitations and leg swelling.   Gastrointestinal: Positive for vomiting. Negative for abdominal distention, abdominal pain, constipation and nausea.   Endocrine: Negative for polydipsia, polyphagia and polyuria.   Genitourinary: Negative for dysuria, frequency and hematuria.   Musculoskeletal: Negative for arthralgias, back pain, gait problem, joint swelling and myalgias.   Skin: Negative for color change, pallor, rash and wound.   Allergic/Immunologic: Negative for immunocompromised state.   Neurological: Positive for dizziness and headaches. Negative for seizures, speech difficulty, weakness and numbness.   Hematological: Negative for adenopathy. Does not bruise/bleed easily.   Psychiatric/Behavioral: Negative for agitation, behavioral problems, confusion and decreased concentration.   All other systems reviewed and are negative.    Objective:     Vital Signs (Most Recent):  Temp: 98.2 °F (36.8 °C) (01/18/18 0752)  Pulse: 60 (01/18/18 0752)  Resp: 16 (01/18/18 0752)  BP: (!) 160/71 (01/18/18 0752)  SpO2: 97 % (01/18/18 0752) Vital Signs (24h Range):  Temp:  [98 °F (36.7 °C)-98.4 °F (36.9 °C)] 98.2 °F (36.8 °C)  Pulse:  [57-66] 60  Resp:  [14-17] 16  SpO2:  [96 %-98 %] 97 %  BP: (125-201)/(69-89) 160/71   Weight: 53 kg (116 lb 13.5 oz)  Body mass index is 19.44 kg/m².      Intake/Output Summary (Last 24 hours) at 01/18/18 0951  Last data filed at 01/18/18 0402   Gross per 24 hour   Intake              610 ml   Output             1570 ml   Net             -960 ml       Physical Exam   Constitutional: She is oriented to person, place, and time. She appears well-developed and well-nourished. No distress.   HENT:   Head: Normocephalic and atraumatic.   Eyes: EOM are normal. Pupils are equal, round, and reactive to light. Right eye exhibits no discharge. Left eye exhibits no discharge.   Neck: Normal range  of motion. Neck supple. No JVD present.   Cardiovascular: Normal rate, regular rhythm and normal heart sounds.  Exam reveals no gallop and no friction rub.    No murmur heard.  175/80   Pulmonary/Chest: Effort normal and breath sounds normal. No respiratory distress. She has no wheezes.   Abdominal: Soft. Bowel sounds are normal. She exhibits no distension and no mass. There is no tenderness. There is no guarding.   Musculoskeletal: Normal range of motion. She exhibits no edema or deformity.   Neurological: She is alert and oriented to person, place, and time.   Skin: Skin is warm and dry. She is not diaphoretic. No erythema. No pallor.   Psychiatric: She has a normal mood and affect. Her behavior is normal.   Nursing note and vitals reviewed.      Vents:     Lines/Drains/Airways     Peripheral Intravenous Line                 Peripheral IV - Single Lumen 01/17/18 0310 Left Wrist 1 day              Significant Labs:    CBC/Anemia Profile:    Recent Labs  Lab 01/17/18  0357 01/18/18  0512   WBC 3.64* 3.83*   HGB 8.9* 8.8*   HCT 28.9* 28.9*    172   MCV 81* 84   RDW 14.0 13.9        Chemistries:    Recent Labs  Lab 01/17/18  0357 01/18/18  0512    138   K 4.0 4.2    107   CO2 25 19*   BUN 60* 56*   CREATININE 2.4* 2.0*   CALCIUM 8.5* 8.9   ALBUMIN 2.5* 2.6*   PROT 6.3 6.4   BILITOT 0.2 0.2   ALKPHOS 70 71   ALT 21 18   AST 31 28   MG 1.6 1.7   PHOS 4.6* 4.6*              Blood Culture: No results for input(s): LABBLOO in the last 48 hours.  Lactic Acid: No results for input(s): LACTATE in the last 48 hours.    Significant Imaging: I have reviewed all pertinent imaging results/findings within the past 24 hours.     1.  No CT evidence of acute intracranial abnormality. Clinical correlation and further evaluation as warrented.     2.  Mild generalized cerebral volume loss and microangiopathic change, similar to prior examination    Assessment/Plan:      Anemia associated with chronic renal failure     Stable          Hypertension    Difficult to lower  Meds are at maximum  Consider minoxidil            VTE Risk Mitigation         Ordered     Medium Risk of VTE  Once      01/14/18 0814              Emeka Martínez MD  Department of Hospital Medicine   [facility]

## 2018-01-18 NOTE — PLAN OF CARE
Problem: Patient Care Overview  Goal: Plan of Care Review  Outcome: Ongoing (interventions implemented as appropriate)  Greeted patient and gained consent for nursing care. She is alert and oriented. Assisted in her needs. Monitored her blood pressure. Safety of patient maintained. She ambulates to the toilet to pass urine. Kept her comfortable. Will continue to monitor.

## 2018-01-18 NOTE — NURSING
Patient still has high /88. Notified the doctor about it. Doctor said he will order something. Will wait for the doctor.

## 2018-01-18 NOTE — SUBJECTIVE & OBJECTIVE
Past Medical History:   Diagnosis Date    Acute on chronic diastolic congestive heart failure 11/17/2017    Allergy     Anatomical narrow angle glaucoma     Anemia     States diagnosed about a month ago    Aortic atherosclerosis     Arthritis     Cataract     CHF (congestive heart failure)     Chronic kidney disease     Chronic sciatica of left side     Right lower back pain due to sciatica    Coronary artery disease     Depression     Dry eyes     GERD (gastroesophageal reflux disease)     History of colon cancer     Hyperlipidemia     Hypertension     Hypothyroidism     Left ventricular diastolic dysfunction with preserved systolic function     Lupus (systemic lupus erythematosus) 8/17/2012    Malnutrition 5/16/2017    Osteoarthritis of cervical spine 8/17/2012    Osteopenia     PAD (peripheral artery disease)        Past Surgical History:   Procedure Laterality Date    CARDIAC CATHETERIZATION  07/27/2011    x1    CHOLECYSTECTOMY  1999    COLON SURGERY  2000 & 2003    partial removal    COLONOSCOPY N/A 4/14/2016    Procedure: COLONOSCOPY;  Surgeon: Jose Steen MD;  Location: [facility] ([address] [place] [address]);  Service: Endoscopy;  Laterality: N/A;  prep 2 days prior light meals only/clear liquid day before  and 4 dulcolax tabs (5 mgs) at noon    COLONOSCOPY N/A 9/14/2017    Procedure: COLONOSCOPY;  Surgeon: Jose Steen MD;  Location: [facility] ([address] [place] [address]);  Service: Endoscopy;  Laterality: N/A;    EYE SURGERY      HAND SURGERY Bilateral 1996 & 1997    due to carpal tunnel     HERNIA REPAIR      HYSTERECTOMY  1983    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    Arthrotec 50 [diclofenac-misoprostol]      Other reaction(s): Unknown    Bentyl [dicyclomine]      Not effective    Doxycycline      nausea    Imdur [isosorbide mononitrate] Nausea Only    Lomotil [diphenoxylate-atropine]      Stomach cramps, pt vomitted    Lozol [indapamide] Rash    Tramadol Nausea Only       Family History     Problem Relation (Age of Onset)    Diabetes Maternal Grandmother, Son, Maternal Aunt    Heart attack Mother    Heart disease Father    Hypertension Mother, Father, Sister, Brother    Kidney disease Brother    Kidney failure Brother    No Known Problems Daughter, Maternal Grandfather, Paternal Grandmother, Paternal Grandfather        Social History Main Topics    Smoking status: Former Smoker     Packs/day: 1.50     Years: 30.00     Types: Cigarettes     Start date: 1955     Quit date: 3/13/1985    Smokeless tobacco: Never Used    Alcohol use No    Drug use: No    Sexual activity: Not Currently     Partners: Male     Birth control/ protection: Abstinence      Review of Systems   Constitutional: Negative for activity change, appetite change, chills, fatigue, fever and unexpected weight change.   HENT: Positive for congestion. Negative for ear discharge and rhinorrhea.    Eyes: Negative for photophobia, redness and visual disturbance.   Respiratory: Negative for apnea, cough, choking, shortness of breath and wheezing.    Cardiovascular: Negative for chest pain, palpitations and leg swelling.   Gastrointestinal: Positive for vomiting. Negative for abdominal distention, abdominal pain, constipation and nausea.   Endocrine: Negative for polydipsia, polyphagia and polyuria.   Genitourinary: Negative for dysuria, frequency and hematuria.   Musculoskeletal: Negative for arthralgias, back pain, gait problem, joint  swelling and myalgias.   Skin: Negative for color change, pallor, rash and wound.   Allergic/Immunologic: Negative for immunocompromised state.   Neurological: Positive for dizziness and headaches. Negative for seizures, speech difficulty, weakness and numbness.   Hematological: Negative for adenopathy. Does not bruise/bleed easily.   Psychiatric/Behavioral: Negative for agitation, behavioral problems, confusion and decreased concentration.   All other systems reviewed and are negative.    Objective:     Vital Signs (Most Recent):  Temp: 98.2 °F (36.8 °C) (01/18/18 0752)  Pulse: 60 (01/18/18 0752)  Resp: 16 (01/18/18 0752)  BP: (!) 160/71 (01/18/18 0752)  SpO2: 97 % (01/18/18 0752) Vital Signs (24h Range):  Temp:  [98 °F (36.7 °C)-98.4 °F (36.9 °C)] 98.2 °F (36.8 °C)  Pulse:  [57-66] 60  Resp:  [14-17] 16  SpO2:  [96 %-98 %] 97 %  BP: (125-201)/(69-89) 160/71   Weight: 53 kg (116 lb 13.5 oz)  Body mass index is 19.44 kg/m².      Intake/Output Summary (Last 24 hours) at 01/18/18 0951  Last data filed at 01/18/18 0402   Gross per 24 hour   Intake              610 ml   Output             1570 ml   Net             -960 ml       Physical Exam   Constitutional: She is oriented to person, place, and time. She appears well-developed and well-nourished. No distress.   HENT:   Head: Normocephalic and atraumatic.   Eyes: EOM are normal. Pupils are equal, round, and reactive to light. Right eye exhibits no discharge. Left eye exhibits no discharge.   Neck: Normal range of motion. Neck supple. No JVD present.   Cardiovascular: Normal rate, regular rhythm and normal heart sounds.  Exam reveals no gallop and no friction rub.    No murmur heard.  175/80   Pulmonary/Chest: Effort normal and breath sounds normal. No respiratory distress. She has no wheezes.   Abdominal: Soft. Bowel sounds are normal. She exhibits no distension and no mass. There is no tenderness. There is no guarding.   Musculoskeletal: Normal range of motion. She  exhibits no edema or deformity.   Neurological: She is alert and oriented to person, place, and time.   Skin: Skin is warm and dry. She is not diaphoretic. No erythema. No pallor.   Psychiatric: She has a normal mood and affect. Her behavior is normal.   Nursing note and vitals reviewed.      Vents:     Lines/Drains/Airways     Peripheral Intravenous Line                 Peripheral IV - Single Lumen 01/17/18 0310 Left Wrist 1 day              Significant Labs:    CBC/Anemia Profile:    Recent Labs  Lab 01/17/18  0357 01/18/18  0512   WBC 3.64* 3.83*   HGB 8.9* 8.8*   HCT 28.9* 28.9*    172   MCV 81* 84   RDW 14.0 13.9        Chemistries:    Recent Labs  Lab 01/17/18  0357 01/18/18  0512    138   K 4.0 4.2    107   CO2 25 19*   BUN 60* 56*   CREATININE 2.4* 2.0*   CALCIUM 8.5* 8.9   ALBUMIN 2.5* 2.6*   PROT 6.3 6.4   BILITOT 0.2 0.2   ALKPHOS 70 71   ALT 21 18   AST 31 28   MG 1.6 1.7   PHOS 4.6* 4.6*              Blood Culture: No results for input(s): LABBLOO in the last 48 hours.  Lactic Acid: No results for input(s): LACTATE in the last 48 hours.    Significant Imaging: I have reviewed all pertinent imaging results/findings within the past 24 hours.     1.  No CT evidence of acute intracranial abnormality. Clinical correlation and further evaluation as warrented.     2.  Mild generalized cerebral volume loss and microangiopathic change, similar to prior examination

## 2018-01-18 NOTE — NURSING
Patient had high BP = 196/86 at 12.19 Pm, gave PRN 0.1 mg Clonidine. BP = 200/86 at 13.50 PM. She had her regular Hydralazine 100 mg at 13.55 PM . BP = 203/88 at 14.52 PM , gave PRN labetolol 10 mg injection. BP =  203/87 after 15 min. Notified the doctor about the events and the still high BP . Ordered to give additional Clonidine 0.1 mg. Gave the med. Will monitor her BP in 30 min.

## 2018-01-18 NOTE — PLAN OF CARE
Problem: Fall Risk (Adult)  Goal: Absence of Falls  Patient will demonstrate the desired outcomes by discharge/transition of care.   Outcome: Ongoing (interventions implemented as appropriate)   01/18/18 2149   Fall Risk (Adult)   Absence of Falls making progress toward outcome   Pt remains free of falls during shift; frequent checks maintained; nonskid slippers on while ambulating; call light within reach; will continue to monitor.

## 2018-01-19 PROBLEM — N17.9 AKI (ACUTE KIDNEY INJURY): Status: ACTIVE | Noted: 2018-01-19

## 2018-01-19 PROCEDURE — 11000001 HC ACUTE MED/SURG PRIVATE ROOM

## 2018-01-19 PROCEDURE — 82088 ASSAY OF ALDOSTERONE: CPT | Mod: 91

## 2018-01-19 PROCEDURE — 25000003 PHARM REV CODE 250: Performed by: HOSPITALIST

## 2018-01-19 PROCEDURE — 25000003 PHARM REV CODE 250: Performed by: NURSE PRACTITIONER

## 2018-01-19 PROCEDURE — 25000003 PHARM REV CODE 250: Performed by: INTERNAL MEDICINE

## 2018-01-19 PROCEDURE — 82088 ASSAY OF ALDOSTERONE: CPT

## 2018-01-19 PROCEDURE — 84156 ASSAY OF PROTEIN URINE: CPT

## 2018-01-19 PROCEDURE — 25000003 PHARM REV CODE 250: Performed by: STUDENT IN AN ORGANIZED HEALTH CARE EDUCATION/TRAINING PROGRAM

## 2018-01-19 PROCEDURE — 81001 URINALYSIS AUTO W/SCOPE: CPT

## 2018-01-19 PROCEDURE — 99223 1ST HOSP IP/OBS HIGH 75: CPT | Mod: ,,, | Performed by: INTERNAL MEDICINE

## 2018-01-19 PROCEDURE — 84244 ASSAY OF RENIN: CPT | Mod: 91

## 2018-01-19 PROCEDURE — 36415 COLL VENOUS BLD VENIPUNCTURE: CPT

## 2018-01-19 PROCEDURE — 99233 SBSQ HOSP IP/OBS HIGH 50: CPT | Mod: ,,, | Performed by: INTERNAL MEDICINE

## 2018-01-19 RX ORDER — NIFEDIPINE 60 MG/1
60 TABLET, EXTENDED RELEASE ORAL DAILY
Status: DISCONTINUED | OUTPATIENT
Start: 2018-01-20 | End: 2018-01-21

## 2018-01-19 RX ORDER — BUTALBITAL, ACETAMINOPHEN AND CAFFEINE 50; 325; 40 MG/1; MG/1; MG/1
1 TABLET ORAL EVERY 4 HOURS PRN
Status: DISCONTINUED | OUTPATIENT
Start: 2018-01-19 | End: 2018-01-26 | Stop reason: HOSPADM

## 2018-01-19 RX ADMIN — HYDRALAZINE HYDROCHLORIDE 100 MG: 50 TABLET ORAL at 05:01

## 2018-01-19 RX ADMIN — HYDROXYCHLOROQUINE SULFATE 200 MG: 200 TABLET, FILM COATED ORAL at 08:01

## 2018-01-19 RX ADMIN — ASPIRIN 81 MG: 81 TABLET, COATED ORAL at 08:01

## 2018-01-19 RX ADMIN — HYDRALAZINE HYDROCHLORIDE 100 MG: 50 TABLET ORAL at 03:01

## 2018-01-19 RX ADMIN — LEVOTHYROXINE SODIUM 75 MCG: 75 TABLET ORAL at 05:01

## 2018-01-19 RX ADMIN — BUTALBITAL, ACETAMINOPHEN, AND CAFFEINE 1 TABLET: 50; 325; 40 TABLET ORAL at 08:01

## 2018-01-19 RX ADMIN — ROSUVASTATIN CALCIUM 40 MG: 20 TABLET, FILM COATED ORAL at 08:01

## 2018-01-19 RX ADMIN — TORSEMIDE 20 MG: 20 TABLET ORAL at 08:01

## 2018-01-19 RX ADMIN — CHOLESTYRAMINE 4 G: 4 POWDER, FOR SUSPENSION ORAL at 05:01

## 2018-01-19 RX ADMIN — CITALOPRAM HYDROBROMIDE 20 MG: 20 TABLET ORAL at 08:01

## 2018-01-19 RX ADMIN — IRBESARTAN 300 MG: 300 TABLET ORAL at 08:01

## 2018-01-19 RX ADMIN — LATANOPROST 1 DROP: 50 SOLUTION OPHTHALMIC at 08:01

## 2018-01-19 RX ADMIN — CARVEDILOL 25 MG: 25 TABLET, FILM COATED ORAL at 08:01

## 2018-01-19 RX ADMIN — HYDRALAZINE HYDROCHLORIDE 100 MG: 50 TABLET ORAL at 08:01

## 2018-01-19 RX ADMIN — CHOLESTYRAMINE 4 G: 4 POWDER, FOR SUSPENSION ORAL at 08:01

## 2018-01-19 RX ADMIN — LABETALOL HYDROCHLORIDE 10 MG: 5 INJECTION INTRAVENOUS at 01:01

## 2018-01-19 RX ADMIN — CARVEDILOL 25 MG: 25 TABLET, FILM COATED ORAL at 05:01

## 2018-01-19 NOTE — CONSULTS
Consult knowledge. Patient seen examined and chart reviewed. Please see full consult note with recs to follow.    Harsh Benitez MD  Nephrology Fellow   [phone]

## 2018-01-19 NOTE — ASSESSMENT & PLAN NOTE
Difficult to lower  Meds are at maximum  Consider minoxidil  Renal consult  U/S of renal arteries

## 2018-01-19 NOTE — SUBJECTIVE & OBJECTIVE
Past Medical History:   Diagnosis Date    Acute on chronic diastolic congestive heart failure 11/17/2017    Allergy     Anatomical narrow angle glaucoma     Anemia     States diagnosed about a month ago    Aortic atherosclerosis     Arthritis     Cataract     CHF (congestive heart failure)     Chronic kidney disease     Chronic sciatica of left side     Right lower back pain due to sciatica    Coronary artery disease     Depression     Dry eyes     GERD (gastroesophageal reflux disease)     History of colon cancer     Hyperlipidemia     Hypertension     Hypothyroidism     Left ventricular diastolic dysfunction with preserved systolic function     Lupus (systemic lupus erythematosus) 8/17/2012    Malnutrition 5/16/2017    Osteoarthritis of cervical spine 8/17/2012    Osteopenia     PAD (peripheral artery disease)        Past Surgical History:   Procedure Laterality Date    CARDIAC CATHETERIZATION  07/27/2011    x1    CHOLECYSTECTOMY  1999    COLON SURGERY  2000 & 2003    partial removal    COLONOSCOPY N/A 4/14/2016    Procedure: COLONOSCOPY;  Surgeon: Jose Steen MD;  Location: [facility] ([address] [place] [address]);  Service: Endoscopy;  Laterality: N/A;  prep 2 days prior light meals only/clear liquid day before  and 4 dulcolax tabs (5 mgs) at noon    COLONOSCOPY N/A 9/14/2017    Procedure: COLONOSCOPY;  Surgeon: Jose Steen MD;  Location: [facility] ([address] [place] [address]);  Service: Endoscopy;  Laterality: N/A;    EYE SURGERY      HAND SURGERY Bilateral 1996 & 1997    due to carpal tunnel     HERNIA REPAIR      HYSTERECTOMY  1983    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    Arthrotec 50 [diclofenac-misoprostol]      Other reaction(s): Unknown    Bentyl [dicyclomine]      Not effective    Doxycycline      nausea    Imdur [isosorbide mononitrate] Nausea Only    Lomotil [diphenoxylate-atropine]      Stomach cramps, pt vomitted    Lozol [indapamide] Rash    Tramadol Nausea Only       Family History     Problem Relation (Age of Onset)    Diabetes Maternal Grandmother, Son, Maternal Aunt    Heart attack Mother    Heart disease Father    Hypertension Mother, Father, Sister, Brother    Kidney disease Brother    Kidney failure Brother    No Known Problems Daughter, Maternal Grandfather, Paternal Grandmother, Paternal Grandfather        Social History Main Topics    Smoking status: Former Smoker     Packs/day: 1.50     Years: 30.00     Types: Cigarettes     Start date: 1955     Quit date: 3/13/1985    Smokeless tobacco: Never Used    Alcohol use No    Drug use: No    Sexual activity: Not Currently     Partners: Male     Birth control/ protection: Abstinence      Review of Systems   Constitutional: Negative for activity change, appetite change, chills, fatigue, fever and unexpected weight change.   HENT: Positive for congestion. Negative for ear discharge and rhinorrhea.    Eyes: Negative for photophobia, redness and visual disturbance.   Respiratory: Negative for apnea, cough, choking, shortness of breath and wheezing.    Cardiovascular: Negative for chest pain, palpitations and leg swelling.   Gastrointestinal: Positive for vomiting. Negative for abdominal distention, abdominal pain, constipation and nausea.   Endocrine: Negative for polydipsia, polyphagia and polyuria.   Genitourinary: Negative for dysuria, frequency and hematuria.   Musculoskeletal: Negative for arthralgias, back pain, gait problem, joint  swelling and myalgias.   Skin: Negative for color change, pallor, rash and wound.   Allergic/Immunologic: Negative for immunocompromised state.   Neurological: Positive for dizziness and headaches. Negative for seizures, speech difficulty, weakness and numbness.   Hematological: Negative for adenopathy. Does not bruise/bleed easily.   Psychiatric/Behavioral: Negative for agitation, behavioral problems, confusion and decreased concentration.   All other systems reviewed and are negative.    Objective:     Vital Signs (Most Recent):  Temp: 97.5 °F (36.4 °C) (01/19/18 0854)  Pulse: (!) 58 (01/19/18 0854)  Resp: 18 (01/19/18 0414)  BP: (!) 147/67 (01/19/18 0854)  SpO2: 98 % (01/19/18 0854) Vital Signs (24h Range):  Temp:  [96.5 °F (35.8 °C)-97.8 °F (36.6 °C)] 97.5 °F (36.4 °C)  Pulse:  [58-67] 58  Resp:  [17-18] 18  SpO2:  [96 %-98 %] 98 %  BP: (147-216)/(67-98) 147/67   Weight: 53 kg (116 lb 13.5 oz)  Body mass index is 19.44 kg/m².      Intake/Output Summary (Last 24 hours) at 01/19/18 1028  Last data filed at 01/18/18 1800   Gross per 24 hour   Intake              530 ml   Output              530 ml   Net                0 ml       Physical Exam   Constitutional: She is oriented to person, place, and time. She appears well-developed and well-nourished. No distress.   HENT:   Head: Normocephalic and atraumatic.   Eyes: EOM are normal. Pupils are equal, round, and reactive to light. Right eye exhibits no discharge. Left eye exhibits no discharge.   Neck: Normal range of motion. Neck supple. No JVD present.   Cardiovascular: Normal rate, regular rhythm and normal heart sounds.  Exam reveals no gallop and no friction rub.    No murmur heard.  175/80   Pulmonary/Chest: Effort normal and breath sounds normal. No respiratory distress. She has no wheezes.   Abdominal: Soft. Bowel sounds are normal. She exhibits no distension and no mass. There is no tenderness. There is no guarding.   Musculoskeletal: Normal range of motion.  She exhibits no edema or deformity.   Neurological: She is alert and oriented to person, place, and time.   Skin: Skin is warm and dry. She is not diaphoretic. No erythema. No pallor.   Psychiatric: She has a normal mood and affect. Her behavior is normal.   Nursing note and vitals reviewed.      Vents:     Lines/Drains/Airways     Peripheral Intravenous Line                 Peripheral IV - Single Lumen 01/17/18 0310 Left Wrist 2 days              Significant Labs:    CBC/Anemia Profile:    Recent Labs  Lab 01/18/18  0512   WBC 3.83*   HGB 8.8*   HCT 28.9*      MCV 84   RDW 13.9        Chemistries:    Recent Labs  Lab 01/18/18  0512      K 4.2      CO2 19*   BUN 56*   CREATININE 2.0*   CALCIUM 8.9   ALBUMIN 2.6*   PROT 6.4   BILITOT 0.2   ALKPHOS 71   ALT 18   AST 28   MG 1.7   PHOS 4.6*              Blood Culture: No results for input(s): LABBLOO in the last 48 hours.  Lactic Acid: No results for input(s): LACTATE in the last 48 hours.    Significant Imaging: I have reviewed all pertinent imaging results/findings within the past 24 hours.     1.  No CT evidence of acute intracranial abnormality. Clinical correlation and further evaluation as warrented.     2.  Mild generalized cerebral volume loss and microangiopathic change, similar to prior examination

## 2018-01-19 NOTE — PROGRESS NOTES
[facility] Hospital Medicine  Progress Note    Patient Name: Ewelina Arnold  MRN: [id number]  Patient Class: IP- Inpatient   Admission Date: 1/14/2018  Length of Stay: 5 days  Attending Physician: Emeka Martínez MD  Primary Care Provider: Kalie Walton MD    [facility] Medicine Team: [facility] MED B Emeka Martínez MD    Subjective:     Principal Problem:Hypertensive emergency    HPI:  No notes on file    Hospital Course:  No notes on file    Past Medical History:   Diagnosis Date    Acute on chronic diastolic congestive heart failure 11/17/2017    Allergy     Anatomical narrow angle glaucoma     Anemia     States diagnosed about a month ago    Aortic atherosclerosis     Arthritis     Cataract     CHF (congestive heart failure)     Chronic kidney disease     Chronic sciatica of left side     Right lower back pain due to sciatica    Coronary artery disease     Depression     Dry eyes     GERD (gastroesophageal reflux disease)     History of colon cancer     Hyperlipidemia     Hypertension     Hypothyroidism     Left ventricular diastolic dysfunction with preserved systolic function     Lupus (systemic lupus erythematosus) 8/17/2012    Malnutrition 5/16/2017    Osteoarthritis of cervical spine 8/17/2012    Osteopenia     PAD (peripheral artery disease)        Past Surgical History:   Procedure Laterality Date    CARDIAC CATHETERIZATION  07/27/2011    x1    CHOLECYSTECTOMY  1999    COLON SURGERY  2000 & 2003    partial removal    COLONOSCOPY N/A 4/14/2016    Procedure: COLONOSCOPY;  Surgeon: Jose Steen MD;  Location: [facility] ([address] [place] [address]);  Service: Endoscopy;  Laterality: N/A;  prep 2 days prior light meals only/clear liquid day before  and 4 dulcolax tabs (5 mgs) at noon    COLONOSCOPY N/A 9/14/2017    Procedure: COLONOSCOPY;  Surgeon: Jose Steen MD;  Location: [facility] ([address] [place] [address]);  Service: Endoscopy;  Laterality: N/A;    EYE SURGERY      HAND SURGERY  Bilateral 1996 & 1997    due to carpal tunnel     HERNIA REPAIR      HYSTERECTOMY  1983    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    Arthrotec 50 [diclofenac-misoprostol]      Other reaction(s): Unknown    Bentyl [dicyclomine]      Not effective    Doxycycline      nausea    Imdur [isosorbide mononitrate] Nausea Only    Lomotil [diphenoxylate-atropine]      Stomach cramps, pt vomitted    Lozol [indapamide] Rash    Tramadol Nausea Only       Family History     Problem Relation (Age of Onset)    Diabetes Maternal Grandmother, Son, Maternal Aunt    Heart attack Mother    Heart disease Father    Hypertension Mother, Father, Sister, Brother    Kidney disease Brother    Kidney failure Brother    No Known Problems Daughter, Maternal Grandfather, Paternal Grandmother, Paternal Grandfather        Social History Main Topics    Smoking status: Former Smoker     Packs/day: 1.50     Years: 30.00     Types: Cigarettes     Start date: 1955     Quit date: 3/13/1985    Smokeless tobacco: Never Used    Alcohol use No    Drug use: No    Sexual activity: Not Currently     Partners: Male     Birth control/ protection: Abstinence      Review of Systems   Constitutional: Negative for activity change, appetite change, chills, fatigue, fever and unexpected weight change.   HENT: Positive for congestion. Negative for ear discharge and rhinorrhea.    Eyes: Negative for photophobia, redness and visual disturbance.   Respiratory:  Negative for apnea, cough, choking, shortness of breath and wheezing.    Cardiovascular: Negative for chest pain, palpitations and leg swelling.   Gastrointestinal: Positive for vomiting. Negative for abdominal distention, abdominal pain, constipation and nausea.   Endocrine: Negative for polydipsia, polyphagia and polyuria.   Genitourinary: Negative for dysuria, frequency and hematuria.   Musculoskeletal: Negative for arthralgias, back pain, gait problem, joint swelling and myalgias.   Skin: Negative for color change, pallor, rash and wound.   Allergic/Immunologic: Negative for immunocompromised state.   Neurological: Positive for dizziness and headaches. Negative for seizures, speech difficulty, weakness and numbness.   Hematological: Negative for adenopathy. Does not bruise/bleed easily.   Psychiatric/Behavioral: Negative for agitation, behavioral problems, confusion and decreased concentration.   All other systems reviewed and are negative.    Objective:     Vital Signs (Most Recent):  Temp: 97.5 °F (36.4 °C) (01/19/18 0854)  Pulse: (!) 58 (01/19/18 0854)  Resp: 18 (01/19/18 0414)  BP: (!) 147/67 (01/19/18 0854)  SpO2: 98 % (01/19/18 0854) Vital Signs (24h Range):  Temp:  [96.5 °F (35.8 °C)-97.8 °F (36.6 °C)] 97.5 °F (36.4 °C)  Pulse:  [58-67] 58  Resp:  [17-18] 18  SpO2:  [96 %-98 %] 98 %  BP: (147-216)/(67-98) 147/67   Weight: 53 kg (116 lb 13.5 oz)  Body mass index is 19.44 kg/m².      Intake/Output Summary (Last 24 hours) at 01/19/18 1028  Last data filed at 01/18/18 1800   Gross per 24 hour   Intake              530 ml   Output              530 ml   Net                0 ml       Physical Exam   Constitutional: She is oriented to person, place, and time. She appears well-developed and well-nourished. No distress.   HENT:   Head: Normocephalic and atraumatic.   Eyes: EOM are normal. Pupils are equal, round, and reactive to light. Right eye exhibits no discharge. Left eye exhibits no discharge.   Neck: Normal  range of motion. Neck supple. No JVD present.   Cardiovascular: Normal rate, regular rhythm and normal heart sounds.  Exam reveals no gallop and no friction rub.    No murmur heard.  175/80   Pulmonary/Chest: Effort normal and breath sounds normal. No respiratory distress. She has no wheezes.   Abdominal: Soft. Bowel sounds are normal. She exhibits no distension and no mass. There is no tenderness. There is no guarding.   Musculoskeletal: Normal range of motion. She exhibits no edema or deformity.   Neurological: She is alert and oriented to person, place, and time.   Skin: Skin is warm and dry. She is not diaphoretic. No erythema. No pallor.   Psychiatric: She has a normal mood and affect. Her behavior is normal.   Nursing note and vitals reviewed.      Vents:     Lines/Drains/Airways     Peripheral Intravenous Line                 Peripheral IV - Single Lumen 01/17/18 0310 Left Wrist 2 days              Significant Labs:    CBC/Anemia Profile:    Recent Labs  Lab 01/18/18  0512   WBC 3.83*   HGB 8.8*   HCT 28.9*      MCV 84   RDW 13.9        Chemistries:    Recent Labs  Lab 01/18/18  0512      K 4.2      CO2 19*   BUN 56*   CREATININE 2.0*   CALCIUM 8.9   ALBUMIN 2.6*   PROT 6.4   BILITOT 0.2   ALKPHOS 71   ALT 18   AST 28   MG 1.7   PHOS 4.6*              Blood Culture: No results for input(s): LABBLOO in the last 48 hours.  Lactic Acid: No results for input(s): LACTATE in the last 48 hours.    Significant Imaging: I have reviewed all pertinent imaging results/findings within the past 24 hours.     1.  No CT evidence of acute intracranial abnormality. Clinical correlation and further evaluation as warrented.     2.  Mild generalized cerebral volume loss and microangiopathic change, similar to prior examination    Assessment/Plan:      Anemia associated with chronic renal failure    Stable          Hypertension    Difficult to lower  Meds are at maximum  Consider minoxidil  Renal consult  [place] of  renal arteries            VTE Risk Mitigation         Ordered     Medium Risk of VTE  Once      01/14/18 0814              Emeka Martínez MD  Department of Hospital Medicine   [facility]

## 2018-01-20 LAB
ALBUMIN SERPL BCP-MCNC: 2.5 G/DL
ALP SERPL-CCNC: 65 U/L
ALT SERPL W/O P-5'-P-CCNC: 13 U/L
ANION GAP SERPL CALC-SCNC: 7 MMOL/L
AST SERPL-CCNC: 26 U/L
BACTERIA #/AREA URNS AUTO: ABNORMAL /HPF
BASOPHILS # BLD AUTO: 0.02 K/UL
BASOPHILS NFR BLD: 0.5 %
BILIRUB SERPL-MCNC: 0.2 MG/DL
BILIRUB UR QL STRIP: NEGATIVE
BILIRUB UR QL STRIP: NEGATIVE
BUN SERPL-MCNC: 60 MG/DL
CALCIUM SERPL-MCNC: 8.3 MG/DL
CHLORIDE SERPL-SCNC: 105 MMOL/L
CLARITY UR REFRACT.AUTO: ABNORMAL
CLARITY UR REFRACT.AUTO: ABNORMAL
CO2 SERPL-SCNC: 21 MMOL/L
COLOR UR AUTO: YELLOW
COLOR UR AUTO: YELLOW
CREAT SERPL-MCNC: 2.4 MG/DL
CREAT UR-MCNC: 107 MG/DL
DIFFERENTIAL METHOD: ABNORMAL
EOSINOPHIL # BLD AUTO: 0.1 K/UL
EOSINOPHIL NFR BLD: 2.7 %
ERYTHROCYTE [DISTWIDTH] IN BLOOD BY AUTOMATED COUNT: 13.7 %
EST. GFR  (AFRICAN AMERICAN): 22.6 ML/MIN/1.73 M^2
EST. GFR  (NON AFRICAN AMERICAN): 19.6 ML/MIN/1.73 M^2
GLUCOSE SERPL-MCNC: 83 MG/DL
GLUCOSE UR QL STRIP: NEGATIVE
GLUCOSE UR QL STRIP: NEGATIVE
HCT VFR BLD AUTO: 26.6 %
HGB BLD-MCNC: 8.3 G/DL
HGB UR QL STRIP: NEGATIVE
HGB UR QL STRIP: NEGATIVE
HYALINE CASTS UR QL AUTO: 6 /LPF
IMM GRANULOCYTES # BLD AUTO: 0.01 K/UL
IMM GRANULOCYTES NFR BLD AUTO: 0.2 %
KETONES UR QL STRIP: NEGATIVE
KETONES UR QL STRIP: NEGATIVE
LEUKOCYTE ESTERASE UR QL STRIP: NEGATIVE
LEUKOCYTE ESTERASE UR QL STRIP: NEGATIVE
LYMPHOCYTES # BLD AUTO: 1.2 K/UL
LYMPHOCYTES NFR BLD: 30.2 %
MAGNESIUM SERPL-MCNC: 1.8 MG/DL
MCH RBC QN AUTO: 25.2 PG
MCHC RBC AUTO-ENTMCNC: 31.2 G/DL
MCV RBC AUTO: 81 FL
MICROSCOPIC COMMENT: ABNORMAL
MONOCYTES # BLD AUTO: 0.6 K/UL
MONOCYTES NFR BLD: 14.4 %
NEUTROPHILS # BLD AUTO: 2.1 K/UL
NEUTROPHILS NFR BLD: 52 %
NITRITE UR QL STRIP: NEGATIVE
NITRITE UR QL STRIP: NEGATIVE
NRBC BLD-RTO: 0 /100 WBC
PH UR STRIP: 5 [PH] (ref 5–8)
PH UR STRIP: 5 [PH] (ref 5–8)
PHOSPHATE SERPL-MCNC: 5.1 MG/DL
PLATELET # BLD AUTO: 165 K/UL
PMV BLD AUTO: 11.3 FL
POTASSIUM SERPL-SCNC: 4.1 MMOL/L
PROT SERPL-MCNC: 6.1 G/DL
PROT UR QL STRIP: ABNORMAL
PROT UR QL STRIP: ABNORMAL
PROT UR-MCNC: 131 MG/DL
PROT/CREAT RATIO, UR: 1.22
RBC # BLD AUTO: 3.3 M/UL
RBC #/AREA URNS AUTO: 1 /HPF (ref 0–4)
SODIUM SERPL-SCNC: 133 MMOL/L
SP GR UR STRIP: 1.01 (ref 1–1.03)
SP GR UR STRIP: 1.01 (ref 1–1.03)
SQUAMOUS #/AREA URNS AUTO: 3 /HPF
URN SPEC COLLECT METH UR: ABNORMAL
URN SPEC COLLECT METH UR: ABNORMAL
UROBILINOGEN UR STRIP-ACNC: NEGATIVE EU/DL
UROBILINOGEN UR STRIP-ACNC: NEGATIVE EU/DL
WBC # BLD AUTO: 4.11 K/UL
WBC #/AREA URNS AUTO: 2 /HPF (ref 0–5)

## 2018-01-20 PROCEDURE — 25000003 PHARM REV CODE 250: Performed by: INTERNAL MEDICINE

## 2018-01-20 PROCEDURE — 25000003 PHARM REV CODE 250: Performed by: NURSE PRACTITIONER

## 2018-01-20 PROCEDURE — 83735 ASSAY OF MAGNESIUM: CPT

## 2018-01-20 PROCEDURE — 25000003 PHARM REV CODE 250: Performed by: STUDENT IN AN ORGANIZED HEALTH CARE EDUCATION/TRAINING PROGRAM

## 2018-01-20 PROCEDURE — 99233 SBSQ HOSP IP/OBS HIGH 50: CPT | Mod: ,,, | Performed by: INTERNAL MEDICINE

## 2018-01-20 PROCEDURE — 11000001 HC ACUTE MED/SURG PRIVATE ROOM

## 2018-01-20 PROCEDURE — 36415 COLL VENOUS BLD VENIPUNCTURE: CPT

## 2018-01-20 PROCEDURE — 25000003 PHARM REV CODE 250: Performed by: HOSPITALIST

## 2018-01-20 PROCEDURE — 84100 ASSAY OF PHOSPHORUS: CPT

## 2018-01-20 PROCEDURE — 85025 COMPLETE CBC W/AUTO DIFF WBC: CPT

## 2018-01-20 PROCEDURE — 80053 COMPREHEN METABOLIC PANEL: CPT

## 2018-01-20 RX ADMIN — HYDROXYCHLOROQUINE SULFATE 200 MG: 200 TABLET, FILM COATED ORAL at 09:01

## 2018-01-20 RX ADMIN — CARVEDILOL 25 MG: 25 TABLET, FILM COATED ORAL at 09:01

## 2018-01-20 RX ADMIN — CITALOPRAM HYDROBROMIDE 20 MG: 20 TABLET ORAL at 09:01

## 2018-01-20 RX ADMIN — HYDRALAZINE HYDROCHLORIDE 100 MG: 50 TABLET ORAL at 01:01

## 2018-01-20 RX ADMIN — HYDRALAZINE HYDROCHLORIDE 100 MG: 50 TABLET ORAL at 05:01

## 2018-01-20 RX ADMIN — LATANOPROST 1 DROP: 50 SOLUTION OPHTHALMIC at 09:01

## 2018-01-20 RX ADMIN — CHOLESTYRAMINE 4 G: 4 POWDER, FOR SUSPENSION ORAL at 04:01

## 2018-01-20 RX ADMIN — ASPIRIN 81 MG: 81 TABLET, COATED ORAL at 09:01

## 2018-01-20 RX ADMIN — IRBESARTAN 300 MG: 300 TABLET ORAL at 09:01

## 2018-01-20 RX ADMIN — BUTALBITAL, ACETAMINOPHEN, AND CAFFEINE 1 TABLET: 50; 325; 40 TABLET ORAL at 09:01

## 2018-01-20 RX ADMIN — ROSUVASTATIN CALCIUM 40 MG: 20 TABLET, FILM COATED ORAL at 09:01

## 2018-01-20 RX ADMIN — CHOLESTYRAMINE 4 G: 4 POWDER, FOR SUSPENSION ORAL at 11:01

## 2018-01-20 RX ADMIN — BUTALBITAL, ACETAMINOPHEN, AND CAFFEINE 1 TABLET: 50; 325; 40 TABLET ORAL at 03:01

## 2018-01-20 RX ADMIN — TORSEMIDE 20 MG: 20 TABLET ORAL at 09:01

## 2018-01-20 RX ADMIN — HYDRALAZINE HYDROCHLORIDE 100 MG: 50 TABLET ORAL at 09:01

## 2018-01-20 RX ADMIN — CHOLESTYRAMINE 4 G: 4 POWDER, FOR SUSPENSION ORAL at 09:01

## 2018-01-20 RX ADMIN — LEVOTHYROXINE SODIUM 75 MCG: 75 TABLET ORAL at 05:01

## 2018-01-20 RX ADMIN — NIFEDIPINE 60 MG: 60 TABLET, FILM COATED, EXTENDED RELEASE ORAL at 09:01

## 2018-01-20 RX ADMIN — CARVEDILOL 25 MG: 25 TABLET, FILM COATED ORAL at 04:01

## 2018-01-20 NOTE — SUBJECTIVE & OBJECTIVE
Past Medical History:   Diagnosis Date    Acute on chronic diastolic congestive heart failure 11/17/2017    Allergy     Anatomical narrow angle glaucoma     Anemia     States diagnosed about a month ago    Aortic atherosclerosis     Arthritis     Cataract     CHF (congestive heart failure)     Chronic kidney disease     Chronic sciatica of left side     Right lower back pain due to sciatica    Coronary artery disease     Depression     Dry eyes     GERD (gastroesophageal reflux disease)     History of colon cancer     Hyperlipidemia     Hypertension     Hypothyroidism     Left ventricular diastolic dysfunction with preserved systolic function     Lupus (systemic lupus erythematosus) 8/17/2012    Malnutrition 5/16/2017    Osteoarthritis of cervical spine 8/17/2012    Osteopenia     PAD (peripheral artery disease)        Past Surgical History:   Procedure Laterality Date    CARDIAC CATHETERIZATION  07/27/2011    x1    CHOLECYSTECTOMY  1999    COLON SURGERY  2000 & 2003    partial removal    COLONOSCOPY N/A 4/14/2016    Procedure: COLONOSCOPY;  Surgeon: Jose Steen MD;  Location: [facility] ([address] [place] [address]);  Service: Endoscopy;  Laterality: N/A;  prep 2 days prior light meals only/clear liquid day before  and 4 dulcolax tabs (5 mgs) at noon    COLONOSCOPY N/A 9/14/2017    Procedure: COLONOSCOPY;  Surgeon: Jose Steen MD;  Location: [facility] ([address] [place] [address]);  Service: Endoscopy;  Laterality: N/A;    EYE SURGERY      HAND SURGERY Bilateral 1996 & 1997    due to carpal tunnel     HERNIA REPAIR      HYSTERECTOMY  1983    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    Arthrotec 50 [diclofenac-misoprostol]      Other reaction(s): Unknown    Bentyl [dicyclomine]      Not effective    Doxycycline      nausea    Imdur [isosorbide mononitrate] Nausea Only    Lomotil [diphenoxylate-atropine]      Stomach cramps, pt vomitted    Lozol [indapamide] Rash    Tramadol Nausea Only     Current Facility-Administered Medications   Medication Frequency    acetaminophen tablet 650 mg Q6H PRN    aspirin EC tablet 81 mg Daily    butalbital-acetaminophen-caffeine -40 mg per tablet 1 tablet Q4H PRN    carvedilol tablet 25 mg BID WM    cholestyramine 4 gram packet 4 g TID WM    citalopram tablet 20 mg Daily    cloNIDine tablet 0.1 mg Once    dextrose 50% injection 12.5 g PRN    dextrose 50% injection 25 g PRN    glucagon (human recombinant) injection 1 mg PRN    glucose chewable tablet 16 g PRN    glucose chewable tablet 24 g PRN    hydrALAZINE tablet 100 mg Q8H    hydroxychloroquine tablet 200 mg BID    irbesartan tablet 300 mg QHS    labetalol injection 10 mg Q6H PRN    labetalol injection 10 mg Once    labetalol injection 10 mg Q4H PRN    latanoprost 0.005 % ophthalmic solution 1 drop Daily    levothyroxine tablet 75 mcg Before breakfast    [START ON 1/20/2018] NIFEdipine 24 hr tablet 60 mg Daily    nystatin-triamcinolone cream QID PRN    ondansetron disintegrating tablet 8 mg Q8H PRN    pantoprazole EC tablet 40 mg Daily    promethazine tablet 25 mg Q6H PRN    rosuvastatin tablet 40 mg QHS    sodium chloride 0.9% flush 3 mL PRN    torsemide tablet 20 mg Daily     Family History     Problem Relation (Age of Onset)    Diabetes Maternal Grandmother, Son, Maternal Aunt    Heart attack Mother    Heart disease Father    Hypertension Mother, Father, Sister, Brother    Kidney disease Brother     Kidney failure Brother    No Known Problems Daughter, Maternal Grandfather, Paternal Grandmother, Paternal Grandfather        Social History Main Topics    Smoking status: Former Smoker     Packs/day: 1.50     Years: 30.00     Types: Cigarettes     Start date: 1955     Quit date: 3/13/1985    Smokeless tobacco: Never Used    Alcohol use No    Drug use: No    Sexual activity: Not Currently     Partners: Male     Birth control/ protection: Abstinence     Review of Systems   Constitutional: Negative for appetite change, fatigue, fever and unexpected weight change.   HENT: Negative for facial swelling, hearing loss and tinnitus.    Eyes: Negative for visual disturbance.   Respiratory: Negative for chest tightness and shortness of breath.    Cardiovascular: Negative for chest pain and leg swelling.   Gastrointestinal: Negative for abdominal pain and nausea.   Genitourinary: Negative for difficulty urinating, dysuria and flank pain.   Musculoskeletal: Negative for arthralgias and myalgias.   Skin: Negative for color change.   Neurological: Positive for dizziness, weakness and headaches. Negative for syncope and light-headedness.   Psychiatric/Behavioral: Negative for behavioral problems and confusion.     Objective:     Vital Signs (Most Recent):  Temp: 98 °F (36.7 °C) (01/19/18 1958)  Pulse: 60 (01/19/18 2043)  Resp: 18 (01/19/18 1958)  BP: (!) 142/65 (01/19/18 2043)  SpO2: 97 % (01/19/18 1958)  O2 Device (Oxygen Therapy): room air (01/19/18 1958) Vital Signs (24h Range):  Temp:  [96.7 °F (35.9 °C)-98 °F (36.7 °C)] 98 °F (36.7 °C)  Pulse:  [56-61] 60  Resp:  [18] 18  SpO2:  [96 %-98 %] 97 %  BP: (135-203)/(63-90) 142/65     Weight: 53 kg (116 lb 13.5 oz) (01/14/18 2104)  Body mass index is 19.44 kg/m².  Body surface area is 1.56 meters squared.    I/O last 3 completed shifts:  In: 530 [P.O.:530]  Out: 530 [Urine:530]    Physical Exam   Constitutional: She is oriented to person, place, and time. She appears  well-developed. No distress.   HENT:   Head: Normocephalic and atraumatic.   Eyes: Conjunctivae are normal. Pupils are equal, round, and reactive to light.   Neck: Trachea normal. Neck supple. No JVD present.   Cardiovascular: Normal rate, regular rhythm, S1 normal, S2 normal, intact distal pulses and normal pulses.  Exam reveals no gallop and no friction rub.    No murmur heard.  Pulmonary/Chest: Effort normal and breath sounds normal.   Abdominal: Soft. Bowel sounds are normal. She exhibits no distension. There is no tenderness.   Musculoskeletal: Normal range of motion.   Neurological: She is alert and oriented to person, place, and time.   Skin: Skin is warm and dry. Capillary refill takes less than 2 seconds.   Psychiatric: She has a normal mood and affect. Her behavior is normal.   Vitals reviewed.      Significant Labs:  BMP:   Recent Labs  Lab 01/18/18  0512   GLU 83      CO2 19*   BUN 56*   CREATININE 2.0*   CALCIUM 8.9   MG 1.7     Cardiac Markers: No results for input(s): CKMB, TROPONINT, MYOGLOBIN in the last 168 hours.  CBC:   Recent Labs  Lab 01/18/18 0512   WBC 3.83*   RBC 3.45*   HGB 8.8*   HCT 28.9*      MCV 84   MCH 25.5*   MCHC 30.4*     CMP:   Recent Labs  Lab 01/18/18 0512   GLU 83   CALCIUM 8.9   ALBUMIN 2.6*   PROT 6.4      K 4.2   CO2 19*      BUN 56*   CREATININE 2.0*   ALKPHOS 71   ALT 18   AST 28   BILITOT 0.2     All labs within the past 24 hours have been reviewed.    Significant Imaging:  Labs: Reviewed  ECG: Reviewed

## 2018-01-20 NOTE — PROGRESS NOTES
[facility] Hospital Medicine  Progress Note    Patient Name: Ewelina Arnold  MRN: [id number]  Patient Class: IP- Inpatient   Admission Date: 1/14/2018  Length of Stay: 6 days  Attending Physician: Emeka Martínez MD  Primary Care Provider: Kalie Walton MD    [facility] Medicine Team: [facility] HOSP MED B Emeka Martínez MD    Subjective:     Principal Problem:Hypertensive emergency    HPI:  No notes on file    Hospital Course:  No notes on file    Past Medical History:   Diagnosis Date    Acute on chronic diastolic congestive heart failure 11/17/2017    Allergy     Anatomical narrow angle glaucoma     Anemia     States diagnosed about a month ago    Aortic atherosclerosis     Arthritis     Cataract     CHF (congestive heart failure)     Chronic kidney disease     Chronic sciatica of left side     Right lower back pain due to sciatica    Coronary artery disease     Depression     Dry eyes     GERD (gastroesophageal reflux disease)     History of colon cancer     Hyperlipidemia     Hypertension     Hypothyroidism     Left ventricular diastolic dysfunction with preserved systolic function     Lupus (systemic lupus erythematosus) 8/17/2012    Malnutrition 5/16/2017    Osteoarthritis of cervical spine 8/17/2012    Osteopenia     PAD (peripheral artery disease)        Past Surgical History:   Procedure Laterality Date    CARDIAC CATHETERIZATION  07/27/2011    x1    CHOLECYSTECTOMY  1999    COLON SURGERY  2000 & 2003    partial removal    COLONOSCOPY N/A 4/14/2016    Procedure: COLONOSCOPY;  Surgeon: Jose Steen MD;  Location: [facility] ([address] [place] [address]);  Service: Endoscopy;  Laterality: N/A;  prep 2 days prior light meals only/clear liquid day before  and 4 dulcolax tabs (5 mgs) at noon    COLONOSCOPY N/A 9/14/2017    Procedure: COLONOSCOPY;  Surgeon: Jose Steen MD;  Location: [facility] ([address] [place] [address]);  Service: Endoscopy;  Laterality: N/A;    EYE SURGERY      HAND SURGERY  Bilateral 1996 & 1997    due to carpal tunnel     HERNIA REPAIR      HYSTERECTOMY  1983    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    Arthrotec 50 [diclofenac-misoprostol]      Other reaction(s): Unknown    Bentyl [dicyclomine]      Not effective    Doxycycline      nausea    Imdur [isosorbide mononitrate] Nausea Only    Lomotil [diphenoxylate-atropine]      Stomach cramps, pt vomitted    Lozol [indapamide] Rash    Tramadol Nausea Only       Family History     Problem Relation (Age of Onset)    Diabetes Maternal Grandmother, Son, Maternal Aunt    Heart attack Mother    Heart disease Father    Hypertension Mother, Father, Sister, Brother    Kidney disease Brother    Kidney failure Brother    No Known Problems Daughter, Maternal Grandfather, Paternal Grandmother, Paternal Grandfather        Social History Main Topics    Smoking status: Former Smoker     Packs/day: 1.50     Years: 30.00     Types: Cigarettes     Start date: 1955     Quit date: 3/13/1985    Smokeless tobacco: Never Used    Alcohol use No    Drug use: No    Sexual activity: Not Currently     Partners: Male     Birth control/ protection: Abstinence      Review of Systems   Constitutional: Negative for activity change, appetite change, chills, fatigue, fever and unexpected weight change.   HENT: Positive for congestion. Negative for ear discharge and rhinorrhea.    Eyes: Negative for photophobia, redness and visual disturbance.   Respiratory:  Negative for apnea, cough, choking, shortness of breath and wheezing.    Cardiovascular: Negative for chest pain, palpitations and leg swelling.   Gastrointestinal: Positive for vomiting. Negative for abdominal distention, abdominal pain, constipation and nausea.   Endocrine: Negative for polydipsia, polyphagia and polyuria.   Genitourinary: Negative for dysuria, frequency and hematuria.   Musculoskeletal: Negative for arthralgias, back pain, gait problem, joint swelling and myalgias.   Skin: Negative for color change, pallor, rash and wound.   Allergic/Immunologic: Negative for immunocompromised state.   Neurological: Positive for dizziness and headaches. Negative for seizures, speech difficulty, weakness and numbness.   Hematological: Negative for adenopathy. Does not bruise/bleed easily.   Psychiatric/Behavioral: Negative for agitation, behavioral problems, confusion and decreased concentration.   All other systems reviewed and are negative.    Objective:     Vital Signs (Most Recent):  Temp: 98.1 °F (36.7 °C) (01/20/18 1125)  Pulse: 62 (01/20/18 1125)  Resp: 17 (01/20/18 1125)  BP: (!) 113/54 (01/20/18 1125)  SpO2: 100 % (01/20/18 1125) Vital Signs (24h Range):  Temp:  [96.6 °F (35.9 °C)-98.4 °F (36.9 °C)] 98.1 °F (36.7 °C)  Pulse:  [54-62] 62  Resp:  [16-18] 17  SpO2:  [95 %-100 %] 100 %  BP: (113-203)/(54-87) 113/54   Weight: 53 kg (116 lb 13.5 oz)  Body mass index is 19.44 kg/m².    No intake or output data in the 24 hours ending 01/20/18 1451    Physical Exam   Constitutional: She is oriented to person, place, and time. She appears well-developed and well-nourished. No distress.   HENT:   Head: Normocephalic and atraumatic.   Eyes: EOM are normal. Pupils are equal, round, and reactive to light. Right eye exhibits no discharge. Left eye exhibits no discharge.   Neck: Normal range of motion. Neck supple. No JVD present.   Cardiovascular: Normal rate, regular rhythm and normal heart sounds.  Exam reveals no  gallop and no friction rub.    No murmur heard.  175/80   Pulmonary/Chest: Effort normal and breath sounds normal. No respiratory distress. She has no wheezes.   Abdominal: Soft. Bowel sounds are normal. She exhibits no distension and no mass. There is no tenderness. There is no guarding.   Musculoskeletal: Normal range of motion. She exhibits no edema or deformity.   Neurological: She is alert and oriented to person, place, and time.   Skin: Skin is warm and dry. She is not diaphoretic. No erythema. No pallor.   Psychiatric: She has a normal mood and affect. Her behavior is normal.   Nursing note and vitals reviewed.      Vents:     Lines/Drains/Airways     Peripheral Intravenous Line                 Peripheral IV - Single Lumen 01/17/18 0310 Left Wrist 3 days              Significant Labs:    CBC/Anemia Profile:    Recent Labs  Lab 01/20/18  0336   WBC 4.11   HGB 8.3*   HCT 26.6*      MCV 81*   RDW 13.7        Chemistries:    Recent Labs  Lab 01/20/18  0336   *   K 4.1      CO2 21*   BUN 60*   CREATININE 2.4*   CALCIUM 8.3*   ALBUMIN 2.5*   PROT 6.1   BILITOT 0.2   ALKPHOS 65   ALT 13   AST 26   MG 1.8   PHOS 5.1*              Blood Culture: No results for input(s): LABBLOO in the last 48 hours.  Lactic Acid: No results for input(s): LACTATE in the last 48 hours.    Significant Imaging: I have reviewed all pertinent imaging results/findings within the past 24 hours.     1.  No CT evidence of acute intracranial abnormality. Clinical correlation and further evaluation as warrented.     2.  Mild generalized cerebral volume loss and microangiopathic change, similar to prior examination    Assessment/Plan:      MARYAM on CKD 4    Stable          Anemia associated with chronic renal failure    Stable          Hypertension    Difficult to lower  Meds are at maximum  Consider minoxidil  Renal consult  U/S of renal arteries with possible stenosis            VTE Risk Mitigation         Ordered     Medium  Risk of VTE  Once      01/14/18 0814              Emeka Martínez MD  Department of Hospital Medicine   [facility]

## 2018-01-20 NOTE — ASSESSMENT & PLAN NOTE
MARYAM on CKD 4 suspect iATN from quick correction of her BP on Nicardepene.     · sCr improving and almost back to baseline  · Obtain urine lytes, urine creat  · Check protein creat ratio.   · Reanal US/Doppler suspicious for FRAN  · Electrolytes not compatible with FRAN   · Will check for renin, aldosterone and renin/cydney ratio  · continue to monitor strict I/O's, daily weights, renally dose medications, and avoid nephrotoxic agents

## 2018-01-20 NOTE — ASSESSMENT & PLAN NOTE
CKD stage 4 followed by Dr. Ruffin in nephrology clinic   - will need follow up with nephrology upon dc  -see above

## 2018-01-20 NOTE — PLAN OF CARE
Problem: Fall Risk (Adult)  Goal: Absence of Falls  Patient will demonstrate the desired outcomes by discharge/transition of care.    01/20/18 1658   Fall Risk (Adult)   Absence of Falls making progress toward outcome       Problem: Patient Care Overview  Goal: Plan of Care Review  Outcome: Ongoing (interventions implemented as appropriate)  Pt started on procardia BP better managed

## 2018-01-20 NOTE — HPI
Ms Ewelina Arnold is a pleasant 72 y.o. 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 She was found to have a SBP >230 and she was placed on a cardene drip. She presented with a sCr of 2.0 she has a baseline sCr of 1.8 - 2.0. Upon admission and improvement of her BP her sCr started to rise peaking at 2.4. Today her sCr is back to 2.0. She still is having difficulty in controlling her BP this plus MARYAM was the reason for Nephrology consult. She was prescribed clonidine by Cardiology but when she took the first dose of clonidine 1/10/18.  She began having dizziness, vertigo, headache and nausea for 2 days. In addition she c/o Headaches described as constant located in her occipital region. She is declining clonidine. She has a hx of having been taken off amlodipine due to ankle swelling.

## 2018-01-20 NOTE — CONSULTS
[facility]  Nephrology  Consult Note    Patient Name: Ewelina Arnold  MRN: [id number]  Admission Date: 1/14/2018  Hospital Length of Stay: 5 days  Attending Provider: Emeka Martínez MD   Primary Care Physician: Kalie Walton MD  Principal Problem:Hypertensive emergency    Consults  Subjective:     HPI: Ms Ewelina Arnold is a pleasant 72 y.o. 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 She was found to have a SBP >230 and she was placed on a cardene drip. She presented with a sCr of 2.0 she has a baseline sCr of 1.8 - 2.0. Upon admission and improvement of her BP her sCr started to rise peaking at 2.4. Today her sCr is back to 2.0. She still is having difficulty in controlling her BP this plus MARYAM was the reason for Nephrology consult. She was prescribed clonidine by Cardiology but when she took the first dose of clonidine 1/10/18.  She began having dizziness, vertigo, headache and nausea for 2 days. In addition she c/o Headaches described as constant located in her occipital region. She is declining clonidine. She has a hx of having been taken off amlodipine due to ankle swelling.    Past Medical History:   Diagnosis Date    Acute on chronic diastolic congestive heart failure 11/17/2017    Allergy     Anatomical narrow angle glaucoma     Anemia     States diagnosed about a month ago    Aortic atherosclerosis     Arthritis     Cataract     CHF (congestive heart failure)     Chronic kidney disease     Chronic sciatica of left side     Right lower back pain due to sciatica    Coronary artery disease     Depression     Dry eyes     GERD (gastroesophageal reflux disease)      History of colon cancer     Hyperlipidemia     Hypertension     Hypothyroidism     Left ventricular diastolic dysfunction with preserved systolic function     Lupus (systemic lupus erythematosus) 8/17/2012    Malnutrition 5/16/2017    Osteoarthritis of cervical spine 8/17/2012    Osteopenia     PAD (peripheral artery disease)        Past Surgical History:   Procedure Laterality Date    CARDIAC CATHETERIZATION  07/27/2011    x1    CHOLECYSTECTOMY  1999    COLON SURGERY  2000 & 2003    partial removal    COLONOSCOPY N/A 4/14/2016    Procedure: COLONOSCOPY;  Surgeon: Jose Steen MD;  Location: [facility] (4TH FLR);  Service: Endoscopy;  Laterality: N/A;  prep 2 days prior light meals only/clear liquid day before  and 4 dulcolax tabs (5 mgs) at noon    COLONOSCOPY N/A 9/14/2017    Procedure: COLONOSCOPY;  Surgeon: Jose Steen MD;  Location: [facility] (4TH FLR);  Service: Endoscopy;  Laterality: N/A;    EYE SURGERY      HAND SURGERY Bilateral 1996 & 1997    due to carpal tunnel     HERNIA REPAIR      HYSTERECTOMY  1983    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    Arthrotec 50 [diclofenac-misoprostol]      Other reaction(s): Unknown    Bentyl [dicyclomine]      Not effective    Doxycycline      nausea    Imdur [isosorbide mononitrate] Nausea Only    Lomotil [diphenoxylate-atropine]      Stomach cramps, pt vomitted    Lozol [indapamide] Rash    Tramadol Nausea Only     Current  Facility-Administered Medications   Medication Frequency    acetaminophen tablet 650 mg Q6H PRN    aspirin EC tablet 81 mg Daily    butalbital-acetaminophen-caffeine -40 mg per tablet 1 tablet Q4H PRN    carvedilol tablet 25 mg BID WM    cholestyramine 4 gram packet 4 g TID WM    citalopram tablet 20 mg Daily    cloNIDine tablet 0.1 mg Once    dextrose 50% injection 12.5 g PRN    dextrose 50% injection 25 g PRN    glucagon (human recombinant) injection 1 mg PRN    glucose chewable tablet 16 g PRN    glucose chewable tablet 24 g PRN    hydrALAZINE tablet 100 mg Q8H    hydroxychloroquine tablet 200 mg BID    irbesartan tablet 300 mg QHS    labetalol injection 10 mg Q6H PRN    labetalol injection 10 mg Once    labetalol injection 10 mg Q4H PRN    latanoprost 0.005 % ophthalmic solution 1 drop Daily    levothyroxine tablet 75 mcg Before breakfast    [START ON 1/20/2018] NIFEdipine 24 hr tablet 60 mg Daily    nystatin-triamcinolone cream QID PRN    ondansetron disintegrating tablet 8 mg Q8H PRN    pantoprazole EC tablet 40 mg Daily    promethazine tablet 25 mg Q6H PRN    rosuvastatin tablet 40 mg QHS    sodium chloride 0.9% flush 3 mL PRN    torsemide tablet 20 mg Daily     Family History     Problem Relation (Age of Onset)    Diabetes Maternal Grandmother, Son, Maternal Aunt    Heart attack Mother    Heart disease Father    Hypertension Mother, Father, Sister, Brother    Kidney disease Brother    Kidney failure Brother    No Known Problems Daughter, Maternal Grandfather, Paternal Grandmother, Paternal Grandfather        Social History Main Topics    Smoking status: Former Smoker     Packs/day: 1.50     Years: 30.00     Types: Cigarettes     Start date: 1955     Quit date: 3/13/1985    Smokeless tobacco: Never Used    Alcohol use No    Drug use: No    Sexual activity: Not Currently     Partners: Male     Birth control/ protection: Abstinence     Review of Systems   Constitutional:  Negative for appetite change, fatigue, fever and unexpected weight change.   HENT: Negative for facial swelling, hearing loss and tinnitus.    Eyes: Negative for visual disturbance.   Respiratory: Negative for chest tightness and shortness of breath.    Cardiovascular: Negative for chest pain and leg swelling.   Gastrointestinal: Negative for abdominal pain and nausea.   Genitourinary: Negative for difficulty urinating, dysuria and flank pain.   Musculoskeletal: Negative for arthralgias and myalgias.   Skin: Negative for color change.   Neurological: Positive for dizziness, weakness and headaches. Negative for syncope and light-headedness.   Psychiatric/Behavioral: Negative for behavioral problems and confusion.     Objective:     Vital Signs (Most Recent):  Temp: 98 °F (36.7 °C) (01/19/18 1958)  Pulse: 60 (01/19/18 2043)  Resp: 18 (01/19/18 1958)  BP: (!) 142/65 (01/19/18 2043)  SpO2: 97 % (01/19/18 1958)  O2 Device (Oxygen Therapy): room air (01/19/18 1958) Vital Signs (24h Range):  Temp:  [96.7 °F (35.9 °C)-98 °F (36.7 °C)] 98 °F (36.7 °C)  Pulse:  [56-61] 60  Resp:  [18] 18  SpO2:  [96 %-98 %] 97 %  BP: (135-203)/(63-90) 142/65     Weight: 53 kg (116 lb 13.5 oz) (01/14/18 2104)  Body mass index is 19.44 kg/m².  Body surface area is 1.56 meters squared.    I/O last 3 completed shifts:  In: 530 [P.O.:530]  Out: 530 [Urine:530]    Physical Exam   Constitutional: She is oriented to person, place, and time. She appears well-developed. No distress.   HENT:   Head: Normocephalic and atraumatic.   Eyes: Conjunctivae are normal. Pupils are equal, round, and reactive to light.   Neck: Trachea normal. Neck supple. No JVD present.   Cardiovascular: Normal rate, regular rhythm, S1 normal, S2 normal, intact distal pulses and normal pulses.  Exam reveals no gallop and no friction rub.    No murmur heard.  Pulmonary/Chest: Effort normal and breath sounds normal.   Abdominal: Soft. Bowel sounds are normal. She exhibits no  distension. There is no tenderness.   Musculoskeletal: Normal range of motion.   Neurological: She is alert and oriented to person, place, and time.   Skin: Skin is warm and dry. Capillary refill takes less than 2 seconds.   Psychiatric: She has a normal mood and affect. Her behavior is normal.   Vitals reviewed.      Significant Labs:  BMP:   Recent Labs  Lab 01/18/18 0512   GLU 83      CO2 19*   BUN 56*   CREATININE 2.0*   CALCIUM 8.9   MG 1.7     Cardiac Markers: No results for input(s): CKMB, TROPONINT, MYOGLOBIN in the last 168 hours.  CBC:   Recent Labs  Lab 01/18/18 0512   WBC 3.83*   RBC 3.45*   HGB 8.8*   HCT 28.9*      MCV 84   MCH 25.5*   MCHC 30.4*     CMP:   Recent Labs  Lab 01/18/18 0512   GLU 83   CALCIUM 8.9   ALBUMIN 2.6*   PROT 6.4      K 4.2   CO2 19*      BUN 56*   CREATININE 2.0*   ALKPHOS 71   ALT 18   AST 28   BILITOT 0.2     All labs within the past 24 hours have been reviewed.    Significant Imaging:  Labs: Reviewed  ECG: Reviewed    Assessment/Plan:     MARYAM on CKD 4    MARYAM on CKD 4 suspect iATN from quick correction of her BP on Nicardepene.     · sCr improving and almost back to baseline  · Obtain urine lytes, urine creat  · Check protein creat ratio.   · Reanal US/Doppler suspicious for FRAN  · Electrolytes not compatible with FRAN   · Will check for renin, aldosterone and renin/cydney ratio  · continue to monitor strict I/O's, daily weights, renally dose medications, and avoid nephrotoxic agents                Chronic kidney disease, stage IV (severe)    CKD stage 4 followed by Dr. Ruffin in nephrology clinic   - will need follow up with nephrology upon dc  -see above        Hypertension    · Improving BP but not well control, will continue to monitor. Goal for BP is <130 mmHg SBP and BDP <85 mmHg.   · She doesn't serena any more clonidine  · Will stop clonidine and start Nifedipine 60 mg XL daily  · Cont Coreg Hydralazine and Irbesartan  · Cont Torsemide  · Will look  for secondary hyperaldosteronism in setting of suspected FRAN          Lupus (systemic lupus erythematosus)    Will check UA and UPCR  UA on 1/14/2018 with 2+ protein and positive for occult blood            Thank you for your consult. I will follow-up with patient. Please contact us if you have any additional questions.    Harsh Benitez MD  Nephrology  [facility]      I have reviewed and concur with the fellow's history, physical, assessment, and plan. I have personally interviewed and examined the patient at bedside.

## 2018-01-20 NOTE — ASSESSMENT & PLAN NOTE
· Improving BP but not well control, will continue to monitor. Goal for BP is <130 mmHg SBP and BDP <85 mmHg.   · She doesn't serena any more clonidine  · Will stop clonidine and start Nifedipine 60 mg XL daily  · Cont Coreg Hydralazine and Irbesartan  · Cont Torsemide  · Will look for secondary hyperaldosteronism in setting of suspected FRAN

## 2018-01-20 NOTE — SUBJECTIVE & OBJECTIVE
Past Medical History:   Diagnosis Date    Acute on chronic diastolic congestive heart failure 11/17/2017    Allergy     Anatomical narrow angle glaucoma     Anemia     States diagnosed about a month ago    Aortic atherosclerosis     Arthritis     Cataract     CHF (congestive heart failure)     Chronic kidney disease     Chronic sciatica of left side     Right lower back pain due to sciatica    Coronary artery disease     Depression     Dry eyes     GERD (gastroesophageal reflux disease)     History of colon cancer     Hyperlipidemia     Hypertension     Hypothyroidism     Left ventricular diastolic dysfunction with preserved systolic function     Lupus (systemic lupus erythematosus) 8/17/2012    Malnutrition 5/16/2017    Osteoarthritis of cervical spine 8/17/2012    Osteopenia     PAD (peripheral artery disease)        Past Surgical History:   Procedure Laterality Date    CARDIAC CATHETERIZATION  07/27/2011    x1    CHOLECYSTECTOMY  1999    COLON SURGERY  2000 & 2003    partial removal    COLONOSCOPY N/A 4/14/2016    Procedure: COLONOSCOPY;  Surgeon: Jose Steen MD;  Location: [facility] ([address] [place] [address]);  Service: Endoscopy;  Laterality: N/A;  prep 2 days prior light meals only/clear liquid day before  and 4 dulcolax tabs (5 mgs) at noon    COLONOSCOPY N/A 9/14/2017    Procedure: COLONOSCOPY;  Surgeon: Jose Steen MD;  Location: [facility] ([address] [place] [address]);  Service: Endoscopy;  Laterality: N/A;    EYE SURGERY      HAND SURGERY Bilateral 1996 & 1997    due to carpal tunnel     HERNIA REPAIR      HYSTERECTOMY  1983    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    Arthrotec 50 [diclofenac-misoprostol]      Other reaction(s): Unknown    Bentyl [dicyclomine]      Not effective    Doxycycline      nausea    Imdur [isosorbide mononitrate] Nausea Only    Lomotil [diphenoxylate-atropine]      Stomach cramps, pt vomitted    Lozol [indapamide] Rash    Tramadol Nausea Only       Family History     Problem Relation (Age of Onset)    Diabetes Maternal Grandmother, Son, Maternal Aunt    Heart attack Mother    Heart disease Father    Hypertension Mother, Father, Sister, Brother    Kidney disease Brother    Kidney failure Brother    No Known Problems Daughter, Maternal Grandfather, Paternal Grandmother, Paternal Grandfather        Social History Main Topics    Smoking status: Former Smoker     Packs/day: 1.50     Years: 30.00     Types: Cigarettes     Start date: 1955     Quit date: 3/13/1985    Smokeless tobacco: Never Used    Alcohol use No    Drug use: No    Sexual activity: Not Currently     Partners: Male     Birth control/ protection: Abstinence      Review of Systems   Constitutional: Negative for activity change, appetite change, chills, fatigue, fever and unexpected weight change.   HENT: Positive for congestion. Negative for ear discharge and rhinorrhea.    Eyes: Negative for photophobia, redness and visual disturbance.   Respiratory: Negative for apnea, cough, choking, shortness of breath and wheezing.    Cardiovascular: Negative for chest pain, palpitations and leg swelling.   Gastrointestinal: Positive for vomiting. Negative for abdominal distention, abdominal pain, constipation and nausea.   Endocrine: Negative for polydipsia, polyphagia and polyuria.   Genitourinary: Negative for dysuria, frequency and hematuria.   Musculoskeletal: Negative for arthralgias, back pain, gait problem, joint  swelling and myalgias.   Skin: Negative for color change, pallor, rash and wound.   Allergic/Immunologic: Negative for immunocompromised state.   Neurological: Positive for dizziness and headaches. Negative for seizures, speech difficulty, weakness and numbness.   Hematological: Negative for adenopathy. Does not bruise/bleed easily.   Psychiatric/Behavioral: Negative for agitation, behavioral problems, confusion and decreased concentration.   All other systems reviewed and are negative.    Objective:     Vital Signs (Most Recent):  Temp: 98.1 °F (36.7 °C) (01/20/18 1125)  Pulse: 62 (01/20/18 1125)  Resp: 17 (01/20/18 1125)  BP: (!) 113/54 (01/20/18 1125)  SpO2: 100 % (01/20/18 1125) Vital Signs (24h Range):  Temp:  [96.6 °F (35.9 °C)-98.4 °F (36.9 °C)] 98.1 °F (36.7 °C)  Pulse:  [54-62] 62  Resp:  [16-18] 17  SpO2:  [95 %-100 %] 100 %  BP: (113-203)/(54-87) 113/54   Weight: 53 kg (116 lb 13.5 oz)  Body mass index is 19.44 kg/m².    No intake or output data in the 24 hours ending 01/20/18 1451    Physical Exam   Constitutional: She is oriented to person, place, and time. She appears well-developed and well-nourished. No distress.   HENT:   Head: Normocephalic and atraumatic.   Eyes: EOM are normal. Pupils are equal, round, and reactive to light. Right eye exhibits no discharge. Left eye exhibits no discharge.   Neck: Normal range of motion. Neck supple. No JVD present.   Cardiovascular: Normal rate, regular rhythm and normal heart sounds.  Exam reveals no gallop and no friction rub.    No murmur heard.  175/80   Pulmonary/Chest: Effort normal and breath sounds normal. No respiratory distress. She has no wheezes.   Abdominal: Soft. Bowel sounds are normal. She exhibits no distension and no mass. There is no tenderness. There is no guarding.   Musculoskeletal: Normal range of motion. She exhibits no edema or deformity.   Neurological: She is alert and oriented to person, place, and time.   Skin: Skin is warm and dry.  She is not diaphoretic. No erythema. No pallor.   Psychiatric: She has a normal mood and affect. Her behavior is normal.   Nursing note and vitals reviewed.      Vents:     Lines/Drains/Airways     Peripheral Intravenous Line                 Peripheral IV - Single Lumen 01/17/18 0310 Left Wrist 3 days              Significant Labs:    CBC/Anemia Profile:    Recent Labs  Lab 01/20/18  0336   WBC 4.11   HGB 8.3*   HCT 26.6*      MCV 81*   RDW 13.7        Chemistries:    Recent Labs  Lab 01/20/18  0336   *   K 4.1      CO2 21*   BUN 60*   CREATININE 2.4*   CALCIUM 8.3*   ALBUMIN 2.5*   PROT 6.1   BILITOT 0.2   ALKPHOS 65   ALT 13   AST 26   MG 1.8   PHOS 5.1*              Blood Culture: No results for input(s): LABBLOO in the last 48 hours.  Lactic Acid: No results for input(s): LACTATE in the last 48 hours.    Significant Imaging: I have reviewed all pertinent imaging results/findings within the past 24 hours.     1.  No CT evidence of acute intracranial abnormality. Clinical correlation and further evaluation as warrented.     2.  Mild generalized cerebral volume loss and microangiopathic change, similar to prior examination

## 2018-01-20 NOTE — ASSESSMENT & PLAN NOTE
Difficult to lower  Meds are at maximum  Consider minoxidil  Renal consult  U/S of renal arteries with possible stenosis

## 2018-01-21 PROBLEM — N17.9 AKI (ACUTE KIDNEY INJURY): Status: ACTIVE | Noted: 2018-01-21

## 2018-01-21 LAB
ALBUMIN SERPL BCP-MCNC: 2.4 G/DL
ALP SERPL-CCNC: 65 U/L
ALT SERPL W/O P-5'-P-CCNC: 13 U/L
ANION GAP SERPL CALC-SCNC: 8 MMOL/L
AST SERPL-CCNC: 26 U/L
BASOPHILS # BLD AUTO: 0.02 K/UL
BASOPHILS NFR BLD: 0.5 %
BILIRUB SERPL-MCNC: 0.2 MG/DL
BUN SERPL-MCNC: 66 MG/DL
CALCIUM SERPL-MCNC: 8.1 MG/DL
CHLORIDE SERPL-SCNC: 106 MMOL/L
CO2 SERPL-SCNC: 18 MMOL/L
CREAT SERPL-MCNC: 2.8 MG/DL
DIFFERENTIAL METHOD: ABNORMAL
EOSINOPHIL # BLD AUTO: 0.1 K/UL
EOSINOPHIL NFR BLD: 2.1 %
ERYTHROCYTE [DISTWIDTH] IN BLOOD BY AUTOMATED COUNT: 13.7 %
EST. GFR  (AFRICAN AMERICAN): 18.7 ML/MIN/1.73 M^2
EST. GFR  (NON AFRICAN AMERICAN): 16.2 ML/MIN/1.73 M^2
GLUCOSE SERPL-MCNC: 73 MG/DL
HCT VFR BLD AUTO: 25.9 %
HGB BLD-MCNC: 8.3 G/DL
IMM GRANULOCYTES # BLD AUTO: 0.01 K/UL
IMM GRANULOCYTES NFR BLD AUTO: 0.2 %
LYMPHOCYTES # BLD AUTO: 1.2 K/UL
LYMPHOCYTES NFR BLD: 28.1 %
MAGNESIUM SERPL-MCNC: 1.8 MG/DL
MCH RBC QN AUTO: 25.4 PG
MCHC RBC AUTO-ENTMCNC: 32 G/DL
MCV RBC AUTO: 79 FL
MONOCYTES # BLD AUTO: 0.8 K/UL
MONOCYTES NFR BLD: 18.2 %
NEUTROPHILS # BLD AUTO: 2.2 K/UL
NEUTROPHILS NFR BLD: 50.9 %
NRBC BLD-RTO: 0 /100 WBC
PHOSPHATE SERPL-MCNC: 5.4 MG/DL
PLATELET # BLD AUTO: 160 K/UL
PMV BLD AUTO: 11.3 FL
POTASSIUM SERPL-SCNC: 4.3 MMOL/L
PROT SERPL-MCNC: 5.9 G/DL
RBC # BLD AUTO: 3.27 M/UL
SODIUM SERPL-SCNC: 132 MMOL/L
WBC # BLD AUTO: 4.23 K/UL

## 2018-01-21 PROCEDURE — 25000003 PHARM REV CODE 250: Performed by: HOSPITALIST

## 2018-01-21 PROCEDURE — 36415 COLL VENOUS BLD VENIPUNCTURE: CPT

## 2018-01-21 PROCEDURE — 25000003 PHARM REV CODE 250: Performed by: STUDENT IN AN ORGANIZED HEALTH CARE EDUCATION/TRAINING PROGRAM

## 2018-01-21 PROCEDURE — 80053 COMPREHEN METABOLIC PANEL: CPT

## 2018-01-21 PROCEDURE — 11000001 HC ACUTE MED/SURG PRIVATE ROOM

## 2018-01-21 PROCEDURE — 84100 ASSAY OF PHOSPHORUS: CPT

## 2018-01-21 PROCEDURE — 83735 ASSAY OF MAGNESIUM: CPT

## 2018-01-21 PROCEDURE — 25000003 PHARM REV CODE 250: Performed by: NURSE PRACTITIONER

## 2018-01-21 PROCEDURE — 85025 COMPLETE CBC W/AUTO DIFF WBC: CPT

## 2018-01-21 PROCEDURE — 25000003 PHARM REV CODE 250: Performed by: INTERNAL MEDICINE

## 2018-01-21 RX ADMIN — ONDANSETRON 8 MG: 8 TABLET, ORALLY DISINTEGRATING ORAL at 05:01

## 2018-01-21 RX ADMIN — HYDRALAZINE HYDROCHLORIDE 100 MG: 50 TABLET ORAL at 09:01

## 2018-01-21 RX ADMIN — HYDRALAZINE HYDROCHLORIDE 100 MG: 50 TABLET ORAL at 02:01

## 2018-01-21 RX ADMIN — HYDROXYCHLOROQUINE SULFATE 200 MG: 200 TABLET, FILM COATED ORAL at 09:01

## 2018-01-21 RX ADMIN — CARVEDILOL 25 MG: 25 TABLET, FILM COATED ORAL at 09:01

## 2018-01-21 RX ADMIN — CHOLESTYRAMINE 4 G: 4 POWDER, FOR SUSPENSION ORAL at 09:01

## 2018-01-21 RX ADMIN — ROSUVASTATIN CALCIUM 40 MG: 20 TABLET, FILM COATED ORAL at 09:01

## 2018-01-21 RX ADMIN — LATANOPROST 1 DROP: 50 SOLUTION OPHTHALMIC at 09:01

## 2018-01-21 RX ADMIN — IRBESARTAN 300 MG: 300 TABLET ORAL at 09:01

## 2018-01-21 RX ADMIN — ASPIRIN 81 MG: 81 TABLET, COATED ORAL at 09:01

## 2018-01-21 RX ADMIN — TORSEMIDE 20 MG: 20 TABLET ORAL at 09:01

## 2018-01-21 RX ADMIN — CHOLESTYRAMINE 4 G: 4 POWDER, FOR SUSPENSION ORAL at 04:01

## 2018-01-21 RX ADMIN — HYDRALAZINE HYDROCHLORIDE 100 MG: 50 TABLET ORAL at 05:01

## 2018-01-21 RX ADMIN — LEVOTHYROXINE SODIUM 75 MCG: 75 TABLET ORAL at 05:01

## 2018-01-21 RX ADMIN — CITALOPRAM HYDROBROMIDE 20 MG: 20 TABLET ORAL at 09:01

## 2018-01-21 RX ADMIN — CARVEDILOL 25 MG: 25 TABLET, FILM COATED ORAL at 04:01

## 2018-01-21 RX ADMIN — CHOLESTYRAMINE 4 G: 4 POWDER, FOR SUSPENSION ORAL at 12:01

## 2018-01-22 ENCOUNTER — OUTPATIENT CASE MANAGEMENT (OUTPATIENT)
Dept: ADMINISTRATIVE | Facility: OTHER | Age: 73
End: 2018-01-22

## 2018-01-22 ENCOUNTER — PATIENT MESSAGE (OUTPATIENT)
Dept: CARDIOLOGY | Facility: CLINIC | Age: 73
End: 2018-01-22

## 2018-01-22 LAB
ALBUMIN SERPL BCP-MCNC: 2.4 G/DL
ALDOST SERPL-MCNC: 31.6 NG/DL
ALP SERPL-CCNC: 73 U/L
ALT SERPL W/O P-5'-P-CCNC: 13 U/L
ANION GAP SERPL CALC-SCNC: 9 MMOL/L
AST SERPL-CCNC: 28 U/L
BASOPHILS # BLD AUTO: 0.01 K/UL
BASOPHILS NFR BLD: 0.2 %
BILIRUB SERPL-MCNC: 0.1 MG/DL
BUN SERPL-MCNC: 62 MG/DL
CALCIUM SERPL-MCNC: 8.4 MG/DL
CHLORIDE SERPL-SCNC: 111 MMOL/L
CO2 SERPL-SCNC: 17 MMOL/L
CREAT SERPL-MCNC: 2.5 MG/DL
DIFFERENTIAL METHOD: ABNORMAL
EOSINOPHIL # BLD AUTO: 0.1 K/UL
EOSINOPHIL NFR BLD: 2.2 %
ERYTHROCYTE [DISTWIDTH] IN BLOOD BY AUTOMATED COUNT: 13.7 %
EST. GFR  (AFRICAN AMERICAN): 21.5 ML/MIN/1.73 M^2
EST. GFR  (NON AFRICAN AMERICAN): 18.6 ML/MIN/1.73 M^2
GLUCOSE SERPL-MCNC: 64 MG/DL
HCT VFR BLD AUTO: 27.6 %
HGB BLD-MCNC: 8.8 G/DL
IMM GRANULOCYTES # BLD AUTO: 0.01 K/UL
IMM GRANULOCYTES NFR BLD AUTO: 0.2 %
LYMPHOCYTES # BLD AUTO: 1.1 K/UL
LYMPHOCYTES NFR BLD: 27.2 %
MAGNESIUM SERPL-MCNC: 1.7 MG/DL
MCH RBC QN AUTO: 25.7 PG
MCHC RBC AUTO-ENTMCNC: 31.9 G/DL
MCV RBC AUTO: 81 FL
MONOCYTES # BLD AUTO: 0.8 K/UL
MONOCYTES NFR BLD: 18 %
NEUTROPHILS # BLD AUTO: 2.2 K/UL
NEUTROPHILS NFR BLD: 52.2 %
NRBC BLD-RTO: 0 /100 WBC
PHOSPHATE SERPL-MCNC: 4.5 MG/DL
PLATELET # BLD AUTO: 159 K/UL
PMV BLD AUTO: 10.6 FL
POTASSIUM SERPL-SCNC: 4.3 MMOL/L
PROT SERPL-MCNC: 6.1 G/DL
RBC # BLD AUTO: 3.43 M/UL
SODIUM SERPL-SCNC: 137 MMOL/L
WBC # BLD AUTO: 4.16 K/UL

## 2018-01-22 PROCEDURE — 80053 COMPREHEN METABOLIC PANEL: CPT

## 2018-01-22 PROCEDURE — 25000003 PHARM REV CODE 250: Performed by: HOSPITALIST

## 2018-01-22 PROCEDURE — 25000003 PHARM REV CODE 250: Performed by: INTERNAL MEDICINE

## 2018-01-22 PROCEDURE — 85025 COMPLETE CBC W/AUTO DIFF WBC: CPT

## 2018-01-22 PROCEDURE — 99233 SBSQ HOSP IP/OBS HIGH 50: CPT | Mod: ,,, | Performed by: INTERNAL MEDICINE

## 2018-01-22 PROCEDURE — 25000003 PHARM REV CODE 250: Performed by: NURSE PRACTITIONER

## 2018-01-22 PROCEDURE — 25000003 PHARM REV CODE 250: Performed by: STUDENT IN AN ORGANIZED HEALTH CARE EDUCATION/TRAINING PROGRAM

## 2018-01-22 PROCEDURE — 11000001 HC ACUTE MED/SURG PRIVATE ROOM

## 2018-01-22 PROCEDURE — 36415 COLL VENOUS BLD VENIPUNCTURE: CPT

## 2018-01-22 PROCEDURE — 84100 ASSAY OF PHOSPHORUS: CPT

## 2018-01-22 PROCEDURE — 83735 ASSAY OF MAGNESIUM: CPT

## 2018-01-22 PROCEDURE — 99232 SBSQ HOSP IP/OBS MODERATE 35: CPT | Mod: ,,, | Performed by: INTERNAL MEDICINE

## 2018-01-22 RX ORDER — TORSEMIDE 20 MG/1
20 TABLET ORAL 2 TIMES DAILY
Status: DISCONTINUED | OUTPATIENT
Start: 2018-01-22 | End: 2018-01-26

## 2018-01-22 RX ORDER — HYDRALAZINE HYDROCHLORIDE 50 MG/1
150 TABLET, FILM COATED ORAL EVERY 8 HOURS
Status: DISCONTINUED | OUTPATIENT
Start: 2018-01-22 | End: 2018-01-24

## 2018-01-22 RX ORDER — CLONIDINE 0.1 MG/24H
1 PATCH, EXTENDED RELEASE TRANSDERMAL
Status: DISCONTINUED | OUTPATIENT
Start: 2018-01-22 | End: 2018-01-22

## 2018-01-22 RX ADMIN — HYDROXYCHLOROQUINE SULFATE 200 MG: 200 TABLET, FILM COATED ORAL at 09:01

## 2018-01-22 RX ADMIN — CHOLESTYRAMINE 4 G: 4 POWDER, FOR SUSPENSION ORAL at 08:01

## 2018-01-22 RX ADMIN — LABETALOL HCL 300 MG: 100 TABLET, FILM COATED ORAL at 08:01

## 2018-01-22 RX ADMIN — CITALOPRAM HYDROBROMIDE 20 MG: 20 TABLET ORAL at 09:01

## 2018-01-22 RX ADMIN — LABETALOL HYDROCHLORIDE 10 MG: 5 INJECTION INTRAVENOUS at 05:01

## 2018-01-22 RX ADMIN — HYDRALAZINE HYDROCHLORIDE 150 MG: 50 TABLET ORAL at 10:01

## 2018-01-22 RX ADMIN — LATANOPROST 1 DROP: 50 SOLUTION OPHTHALMIC at 09:01

## 2018-01-22 RX ADMIN — LEVOTHYROXINE SODIUM 75 MCG: 75 TABLET ORAL at 05:01

## 2018-01-22 RX ADMIN — TORSEMIDE 20 MG: 20 TABLET ORAL at 09:01

## 2018-01-22 RX ADMIN — IRBESARTAN 300 MG: 300 TABLET ORAL at 08:01

## 2018-01-22 RX ADMIN — ROSUVASTATIN CALCIUM 40 MG: 20 TABLET, FILM COATED ORAL at 08:01

## 2018-01-22 RX ADMIN — ASPIRIN 81 MG: 81 TABLET, COATED ORAL at 09:01

## 2018-01-22 RX ADMIN — CHOLESTYRAMINE 4 G: 4 POWDER, FOR SUSPENSION ORAL at 12:01

## 2018-01-22 RX ADMIN — HYDRALAZINE HYDROCHLORIDE 150 MG: 50 TABLET ORAL at 02:01

## 2018-01-22 RX ADMIN — HYDRALAZINE HYDROCHLORIDE 100 MG: 50 TABLET ORAL at 05:01

## 2018-01-22 RX ADMIN — LABETALOL HYDROCHLORIDE 10 MG: 5 INJECTION INTRAVENOUS at 04:01

## 2018-01-22 RX ADMIN — LABETALOL HCL 300 MG: 100 TABLET, FILM COATED ORAL at 12:01

## 2018-01-22 RX ADMIN — CARVEDILOL 25 MG: 25 TABLET, FILM COATED ORAL at 08:01

## 2018-01-22 RX ADMIN — CHOLESTYRAMINE 4 G: 4 POWDER, FOR SUSPENSION ORAL at 05:01

## 2018-01-22 RX ADMIN — HYDROXYCHLOROQUINE SULFATE 200 MG: 200 TABLET, FILM COATED ORAL at 08:01

## 2018-01-22 RX ADMIN — TORSEMIDE 20 MG: 20 TABLET ORAL at 08:01

## 2018-01-22 RX ADMIN — LABETALOL HYDROCHLORIDE 10 MG: 5 INJECTION INTRAVENOUS at 10:01

## 2018-01-22 RX ADMIN — BUTALBITAL, ACETAMINOPHEN, AND CAFFEINE 1 TABLET: 50; 325; 40 TABLET ORAL at 08:01

## 2018-01-22 NOTE — ASSESSMENT & PLAN NOTE
· BP not well control, will continue to monitor. Goal for BP is <130 mmHg SBP and BDP <80 mmHg.   · She declines clonidine and Nifedipeine  · Will switch Carvedilol to Labetalol  · Increase Hydral;azine to 150 mg TID  · Cont Irbesartan 300 mg  · Cont Torsemide 20 mg BID  · Will look for secondary hyperaldosteronism in setting of suspected FRAN, pending labs

## 2018-01-22 NOTE — SUBJECTIVE & OBJECTIVE
Past Medical History:   Diagnosis Date    Acute on chronic diastolic congestive heart failure 11/17/2017    Allergy     Anatomical narrow angle glaucoma     Anemia     States diagnosed about a month ago    Aortic atherosclerosis     Arthritis     Cataract     CHF (congestive heart failure)     Chronic kidney disease     Chronic sciatica of left side     Right lower back pain due to sciatica    Coronary artery disease     Depression     Dry eyes     GERD (gastroesophageal reflux disease)     History of colon cancer     Hyperlipidemia     Hypertension     Hypothyroidism     Left ventricular diastolic dysfunction with preserved systolic function     Lupus (systemic lupus erythematosus) 8/17/2012    Malnutrition 5/16/2017    Osteoarthritis of cervical spine 8/17/2012    Osteopenia     PAD (peripheral artery disease)        Past Surgical History:   Procedure Laterality Date    CARDIAC CATHETERIZATION  07/27/2011    x1    CHOLECYSTECTOMY  1999    COLON SURGERY  2000 & 2003    partial removal    COLONOSCOPY N/A 4/14/2016    Procedure: COLONOSCOPY;  Surgeon: Jose Steen MD;  Location: [facility] ([address] [place] [address]);  Service: Endoscopy;  Laterality: N/A;  prep 2 days prior light meals only/clear liquid day before  and 4 dulcolax tabs (5 mgs) at noon    COLONOSCOPY N/A 9/14/2017    Procedure: COLONOSCOPY;  Surgeon: Jose Steen MD;  Location: [facility] ([address] [place] [address]);  Service: Endoscopy;  Laterality: N/A;    EYE SURGERY      HAND SURGERY Bilateral 1996 & 1997    due to carpal tunnel     HERNIA REPAIR      HYSTERECTOMY  1983    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    Arthrotec 50 [diclofenac-misoprostol]      Other reaction(s): Unknown    Bentyl [dicyclomine]      Not effective    Doxycycline      nausea    Imdur [isosorbide mononitrate] Nausea Only    Lomotil [diphenoxylate-atropine]      Stomach cramps, pt vomitted    Lozol [indapamide] Rash    Tramadol Nausea Only       Family History     Problem Relation (Age of Onset)    Diabetes Maternal Grandmother, Son, Maternal Aunt    Heart attack Mother    Heart disease Father    Hypertension Mother, Father, Sister, Brother    Kidney disease Brother    Kidney failure Brother    No Known Problems Daughter, Maternal Grandfather, Paternal Grandmother, Paternal Grandfather        Social History Main Topics    Smoking status: Former Smoker     Packs/day: 1.50     Years: 30.00     Types: Cigarettes     Start date: 1955     Quit date: 3/13/1985    Smokeless tobacco: Never Used    Alcohol use No    Drug use: No    Sexual activity: Not Currently     Partners: Male     Birth control/ protection: Abstinence      Review of Systems   Constitutional: Negative for activity change, appetite change, chills, fatigue, fever and unexpected weight change.   HENT: Positive for congestion. Negative for ear discharge and rhinorrhea.    Eyes: Negative for photophobia, redness and visual disturbance.   Respiratory: Negative for apnea, cough, choking, shortness of breath and wheezing.    Cardiovascular: Negative for chest pain, palpitations and leg swelling.   Gastrointestinal: Positive for vomiting. Negative for abdominal distention, abdominal pain, constipation and nausea.   Endocrine: Negative for polydipsia, polyphagia and polyuria.   Genitourinary: Negative for dysuria, frequency and hematuria.   Musculoskeletal: Negative for arthralgias, back pain, gait problem, joint  swelling and myalgias.   Skin: Negative for color change, pallor, rash and wound.   Allergic/Immunologic: Negative for immunocompromised state.   Neurological: Positive for dizziness and headaches. Negative for seizures, speech difficulty, weakness and numbness.   Hematological: Negative for adenopathy. Does not bruise/bleed easily.   Psychiatric/Behavioral: Negative for agitation, behavioral problems, confusion and decreased concentration.   All other systems reviewed and are negative.    Objective:     Vital Signs (Most Recent):  Temp: 98.5 °F (36.9 °C) (01/22/18 1115)  Pulse: 62 (01/22/18 1129)  Resp: 16 (01/22/18 1115)  BP: (!) 187/81 (01/22/18 1429)  SpO2: 97 % (01/22/18 1115) Vital Signs (24h Range):  Temp:  [97.1 °F (36.2 °C)-99 °F (37.2 °C)] 98.5 °F (36.9 °C)  Pulse:  [61-72] 62  Resp:  [16-18] 16  SpO2:  [95 %-98 %] 97 %  BP: (166-228)/(59-95) 187/81   Weight: 53 kg (116 lb 13.5 oz)  Body mass index is 19.44 kg/m².      Intake/Output Summary (Last 24 hours) at 01/22/18 1439  Last data filed at 01/22/18 1431   Gross per 24 hour   Intake             1570 ml   Output                0 ml   Net             1570 ml       Physical Exam   Constitutional: She is oriented to person, place, and time. She appears well-developed and well-nourished. No distress.   HENT:   Head: Normocephalic and atraumatic.   Eyes: EOM are normal. Pupils are equal, round, and reactive to light. Right eye exhibits no discharge. Left eye exhibits no discharge.   Neck: Normal range of motion. Neck supple. No JVD present.   Cardiovascular: Normal rate, regular rhythm and normal heart sounds.  Exam reveals no gallop and no friction rub.    No murmur heard.  175/80   Pulmonary/Chest: Effort normal and breath sounds normal. No respiratory distress. She has no wheezes.   Abdominal: Soft. Bowel sounds are normal. She exhibits no distension and no mass. There is no tenderness. There is no guarding.   Musculoskeletal: Normal range of motion. She  exhibits no edema or deformity.   Neurological: She is alert and oriented to person, place, and time.   Skin: Skin is warm and dry. She is not diaphoretic. No erythema. No pallor.   Psychiatric: She has a normal mood and affect. Her behavior is normal.   Nursing note and vitals reviewed.      Vents:     Lines/Drains/Airways     Peripheral Intravenous Line                 Peripheral IV - Single Lumen 01/22/18 0625 Left Hand less than 1 day              Significant Labs:    CBC/Anemia Profile:    Recent Labs  Lab 01/21/18  0452 01/22/18  0703   WBC 4.23 4.16   HGB 8.3* 8.8*   HCT 25.9* 27.6*    159   MCV 79* 81*   RDW 13.7 13.7        Chemistries:    Recent Labs  Lab 01/21/18  0452 01/22/18  0703   * 137   K 4.3 4.3    111*   CO2 18* 17*   BUN 66* 62*   CREATININE 2.8* 2.5*   CALCIUM 8.1* 8.4*   ALBUMIN 2.4* 2.4*   PROT 5.9* 6.1   BILITOT 0.2 0.1   ALKPHOS 65 73   ALT 13 13   AST 26 28   MG 1.8 1.7   PHOS 5.4* 4.5              Blood Culture: No results for input(s): LABBLOO in the last 48 hours.  Lactic Acid: No results for input(s): LACTATE in the last 48 hours.    Significant Imaging: I have reviewed all pertinent imaging results/findings within the past 24 hours.     1.  No CT evidence of acute intracranial abnormality. Clinical correlation and further evaluation as warrented.     2.  Mild generalized cerebral volume loss and microangiopathic change, similar to prior examination

## 2018-01-22 NOTE — PLAN OF CARE
Problem: Patient Care Overview  Goal: Plan of Care Review  Outcome: Ongoing (interventions implemented as appropriate)  Patient aaox4. Bed kept locked, lowest position with 2x side rails up while in bed. nonlslip footwear when out of bed. Ambulates with standby assistance. Call bell and personal items within reach. Cardiac diet. Left hand PIV cdi. Telemetry monitoring SR. BP remains elevated, per orders given PRN labatolol. Standard precautions maintained.

## 2018-01-22 NOTE — ASSESSMENT & PLAN NOTE
- UA with no evidence of blood   - UPCR has in creassed from ~0.6 to 1.22  - UA on 1/14/2018 with 2+ protein and positive for occult blood  - She has a hx of singe kidney biopsy is high risk

## 2018-01-22 NOTE — ASSESSMENT & PLAN NOTE
MARYAM on CKD 4 suspect iATN from quick correction of her BP on Nicardepene.     · sCr increased over the weekend but BP was high over the weekend, now trending down  · UPCr elevated 1.22 up from what had been ~ 0.6   · Reanal US/Doppler suspicious for FRAN  · Electrolytes not compatible with FRAN   · renin, aldosterone and renin/cydney ratio in process  · continue to monitor strict I/O's, daily weights, renally dose medications, and avoid nephrotoxic agents

## 2018-01-22 NOTE — PLAN OF CARE
Problem: Fall Risk (Adult)  Goal: Absence of Falls  Patient will demonstrate the desired outcomes by discharge/transition of care.   Outcome: Ongoing (interventions implemented as appropriate)   01/21/18 1835   Fall Risk (Adult)   Absence of Falls making progress toward outcome       Problem: Patient Care Overview  Goal: Plan of Care Review  Outcome: Ongoing (interventions implemented as appropriate)  discussed meds administered this shift, pt states Procardia administered on 1/20/18 caused edema to B feet and nausea, Dr Martínez notified and discontinued med. Medication added to allergy list, no distress noted will continue to monitor.

## 2018-01-22 NOTE — ASSESSMENT & PLAN NOTE
Difficult to lower  Meds are at maximum  Will change coreg to labetolol  Increase torsemide to BID  Renal consult  U/S of renal arteries with possible stenosis

## 2018-01-22 NOTE — PROGRESS NOTES
[facility] Hospital Medicine  Progress Note    Patient Name: Ewelina Arnold  MRN: [id number]  Patient Class: IP- Inpatient   Admission Date: 1/14/2018  Length of Stay: 8 days  Attending Physician: Emeka Martínez MD  Primary Care Provider: Kalie Walton MD    [facility] Medicine Team: [facility] HOSP MED B Emeka Martínez MD    Subjective:     Principal Problem:Hypertensive emergency    HPI:  No notes on file    Hospital Course:  No notes on file    Past Medical History:   Diagnosis Date    Acute on chronic diastolic congestive heart failure 11/17/2017    Allergy     Anatomical narrow angle glaucoma     Anemia     States diagnosed about a month ago    Aortic atherosclerosis     Arthritis     Cataract     CHF (congestive heart failure)     Chronic kidney disease     Chronic sciatica of left side     Right lower back pain due to sciatica    Coronary artery disease     Depression     Dry eyes     GERD (gastroesophageal reflux disease)     History of colon cancer     Hyperlipidemia     Hypertension     Hypothyroidism     Left ventricular diastolic dysfunction with preserved systolic function     Lupus (systemic lupus erythematosus) 8/17/2012    Malnutrition 5/16/2017    Osteoarthritis of cervical spine 8/17/2012    Osteopenia     PAD (peripheral artery disease)        Past Surgical History:   Procedure Laterality Date    CARDIAC CATHETERIZATION  07/27/2011    x1    CHOLECYSTECTOMY  1999    COLON SURGERY  2000 & 2003    partial removal    COLONOSCOPY N/A 4/14/2016    Procedure: COLONOSCOPY;  Surgeon: Jose Steen MD;  Location: [facility] ([address] [place] [address]);  Service: Endoscopy;  Laterality: N/A;  prep 2 days prior light meals only/clear liquid day before  and 4 dulcolax tabs (5 mgs) at noon    COLONOSCOPY N/A 9/14/2017    Procedure: COLONOSCOPY;  Surgeon: Jose Steen MD;  Location: [facility] ([address] [place] [address]);  Service: Endoscopy;  Laterality: N/A;    EYE SURGERY      HAND SURGERY  Bilateral 1996 & 1997    due to carpal tunnel     HERNIA REPAIR      HYSTERECTOMY  1983    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    Arthrotec 50 [diclofenac-misoprostol]      Other reaction(s): Unknown    Bentyl [dicyclomine]      Not effective    Doxycycline      nausea    Imdur [isosorbide mononitrate] Nausea Only    Lomotil [diphenoxylate-atropine]      Stomach cramps, pt vomitted    Lozol [indapamide] Rash    Tramadol Nausea Only       Family History     Problem Relation (Age of Onset)    Diabetes Maternal Grandmother, Son, Maternal Aunt    Heart attack Mother    Heart disease Father    Hypertension Mother, Father, Sister, Brother    Kidney disease Brother    Kidney failure Brother    No Known Problems Daughter, Maternal Grandfather, Paternal Grandmother, Paternal Grandfather        Social History Main Topics    Smoking status: Former Smoker     Packs/day: 1.50     Years: 30.00     Types: Cigarettes     Start date: 1955     Quit date: 3/13/1985    Smokeless tobacco: Never Used    Alcohol use No    Drug use: No    Sexual activity: Not Currently     Partners: Male     Birth control/ protection: Abstinence      Review of Systems   Constitutional: Negative for activity change, appetite change, chills, fatigue, fever and unexpected weight change.   HENT: Positive for congestion. Negative for ear discharge and rhinorrhea.    Eyes: Negative for photophobia, redness and visual disturbance.   Respiratory:  Negative for apnea, cough, choking, shortness of breath and wheezing.    Cardiovascular: Negative for chest pain, palpitations and leg swelling.   Gastrointestinal: Positive for vomiting. Negative for abdominal distention, abdominal pain, constipation and nausea.   Endocrine: Negative for polydipsia, polyphagia and polyuria.   Genitourinary: Negative for dysuria, frequency and hematuria.   Musculoskeletal: Negative for arthralgias, back pain, gait problem, joint swelling and myalgias.   Skin: Negative for color change, pallor, rash and wound.   Allergic/Immunologic: Negative for immunocompromised state.   Neurological: Positive for dizziness and headaches. Negative for seizures, speech difficulty, weakness and numbness.   Hematological: Negative for adenopathy. Does not bruise/bleed easily.   Psychiatric/Behavioral: Negative for agitation, behavioral problems, confusion and decreased concentration.   All other systems reviewed and are negative.    Objective:     Vital Signs (Most Recent):  Temp: 98.5 °F (36.9 °C) (01/22/18 1115)  Pulse: 62 (01/22/18 1129)  Resp: 16 (01/22/18 1115)  BP: (!) 187/81 (01/22/18 1429)  SpO2: 97 % (01/22/18 1115) Vital Signs (24h Range):  Temp:  [97.1 °F (36.2 °C)-99 °F (37.2 °C)] 98.5 °F (36.9 °C)  Pulse:  [61-72] 62  Resp:  [16-18] 16  SpO2:  [95 %-98 %] 97 %  BP: (166-228)/(59-95) 187/81   Weight: 53 kg (116 lb 13.5 oz)  Body mass index is 19.44 kg/m².      Intake/Output Summary (Last 24 hours) at 01/22/18 1439  Last data filed at 01/22/18 1431   Gross per 24 hour   Intake             1570 ml   Output                0 ml   Net             1570 ml       Physical Exam   Constitutional: She is oriented to person, place, and time. She appears well-developed and well-nourished. No distress.   HENT:   Head: Normocephalic and atraumatic.   Eyes: EOM are normal. Pupils are equal, round, and reactive to light. Right eye exhibits no discharge. Left eye exhibits no discharge.   Neck: Normal range  of motion. Neck supple. No JVD present.   Cardiovascular: Normal rate, regular rhythm and normal heart sounds.  Exam reveals no gallop and no friction rub.    No murmur heard.  175/80   Pulmonary/Chest: Effort normal and breath sounds normal. No respiratory distress. She has no wheezes.   Abdominal: Soft. Bowel sounds are normal. She exhibits no distension and no mass. There is no tenderness. There is no guarding.   Musculoskeletal: Normal range of motion. She exhibits no edema or deformity.   Neurological: She is alert and oriented to person, place, and time.   Skin: Skin is warm and dry. She is not diaphoretic. No erythema. No pallor.   Psychiatric: She has a normal mood and affect. Her behavior is normal.   Nursing note and vitals reviewed.      Vents:     Lines/Drains/Airways     Peripheral Intravenous Line                 Peripheral IV - Single Lumen 01/22/18 0625 Left Hand less than 1 day              Significant Labs:    CBC/Anemia Profile:    Recent Labs  Lab 01/21/18  0452 01/22/18  0703   WBC 4.23 4.16   HGB 8.3* 8.8*   HCT 25.9* 27.6*    159   MCV 79* 81*   RDW 13.7 13.7        Chemistries:    Recent Labs  Lab 01/21/18  0452 01/22/18  0703   * 137   K 4.3 4.3    111*   CO2 18* 17*   BUN 66* 62*   CREATININE 2.8* 2.5*   CALCIUM 8.1* 8.4*   ALBUMIN 2.4* 2.4*   PROT 5.9* 6.1   BILITOT 0.2 0.1   ALKPHOS 65 73   ALT 13 13   AST 26 28   MG 1.8 1.7   PHOS 5.4* 4.5              Blood Culture: No results for input(s): LABBLOO in the last 48 hours.  Lactic Acid: No results for input(s): LACTATE in the last 48 hours.    Significant Imaging: I have reviewed all pertinent imaging results/findings within the past 24 hours.     1.  No CT evidence of acute intracranial abnormality. Clinical correlation and further evaluation as warrented.     2.  Mild generalized cerebral volume loss and microangiopathic change, similar to prior examination    Assessment/Plan:      MARYAM on CKD 4    Stable           Anemia associated with chronic renal failure    Stable          Hypertension    Difficult to lower  Meds are at maximum  Will change coreg to labetolol  Increase torsemide to BID  Renal consult  U/S of renal arteries with possible stenosis            VTE Risk Mitigation         Ordered     Medium Risk of VTE  Once      01/14/18 0814              Emkea Martínez MD  Department of Hospital Medicine   [facility]

## 2018-01-22 NOTE — SUBJECTIVE & OBJECTIVE
Interval History:   Her SBP was > 200 today. Over the weekend she c/o Nifedipine causing  Nausea and edema. She states she cant function with Clonidine of Nifedipine.     Review of patient's allergies indicates:   Allergen Reactions    Ace inhibitors      Other reaction(s): Lips Swelling    Vioxx [rofecoxib]      Other reaction(s): lips swelling    Bactrim [sulfamethoxazole-trimethoprim]      Pt would like this medication to be added due to elevation of creatinine with single kidney.    Procardia [nifedipine] Nausea Only and Edema     Feet swelling and nausea, now reports that this has happened in the past and required lasix    Arthrotec 50 [diclofenac-misoprostol]      Other reaction(s): Unknown    Bentyl [dicyclomine]      Not effective    Doxycycline      nausea    Imdur [isosorbide mononitrate] Nausea Only    Lomotil [diphenoxylate-atropine]      Stomach cramps, pt vomitted    Lozol [indapamide] Rash    Tramadol Nausea Only     Current Facility-Administered Medications   Medication Frequency    acetaminophen tablet 650 mg Q6H PRN    aspirin EC tablet 81 mg Daily    butalbital-acetaminophen-caffeine -40 mg per tablet 1 tablet Q4H PRN    cholestyramine 4 gram packet 4 g TID WM    citalopram tablet 20 mg Daily    cloNIDine tablet 0.1 mg Once    dextrose 50% injection 12.5 g PRN    dextrose 50% injection 25 g PRN    glucagon (human recombinant) injection 1 mg PRN    glucose chewable tablet 16 g PRN    glucose chewable tablet 24 g PRN    hydrALAZINE tablet 150 mg Q8H    hydroxychloroquine tablet 200 mg BID    irbesartan tablet 300 mg QHS    labetalol injection 10 mg Q6H PRN    labetalol injection 10 mg Once    labetalol injection 10 mg Q4H PRN    labetalol tablet 300 mg Q12H    latanoprost 0.005 % ophthalmic solution 1 drop Daily    levothyroxine tablet 75 mcg Before breakfast    nystatin-triamcinolone cream QID PRN    ondansetron disintegrating tablet 8 mg Q8H PRN     pantoprazole EC tablet 40 mg Daily    promethazine tablet 25 mg Q6H PRN    rosuvastatin tablet 40 mg QHS    sodium chloride 0.9% flush 3 mL PRN    torsemide tablet 20 mg BID       Objective:     Vital Signs (Most Recent):  Temp: 97.9 °F (36.6 °C) (01/22/18 1556)  Pulse: 62 (01/22/18 1556)  Resp: 18 (01/22/18 1556)  BP: (!) 195/84 (01/22/18 1556)  SpO2: 98 % (01/22/18 1556)  O2 Device (Oxygen Therapy): room air (01/22/18 1115) Vital Signs (24h Range):  Temp:  [97.9 °F (36.6 °C)-99 °F (37.2 °C)] 97.9 °F (36.6 °C)  Pulse:  [62-72] 62  Resp:  [16-18] 18  SpO2:  [95 %-98 %] 98 %  BP: (166-228)/(59-95) 195/84     Weight: 53 kg (116 lb 13.5 oz) (01/14/18 2104)  Body mass index is 19.44 kg/m².  Body surface area is 1.56 meters squared.    I/O last 3 completed shifts:  In: 980 [P.O.:980]  Out: -     Physical Exam   Constitutional: She is oriented to person, place, and time. She appears well-developed. No distress.   HENT:   Head: Normocephalic and atraumatic.   Eyes: Conjunctivae are normal. Pupils are equal, round, and reactive to light.   Neck: Trachea normal. Neck supple. No JVD present.   Cardiovascular: Normal rate, regular rhythm, S1 normal, S2 normal, intact distal pulses and normal pulses.  Exam reveals no gallop and no friction rub.    No murmur heard.  Pulmonary/Chest: Effort normal and breath sounds normal.   Abdominal: Soft. Bowel sounds are normal. She exhibits no distension. There is no tenderness.   Musculoskeletal: Normal range of motion.   Neurological: She is alert and oriented to person, place, and time.   Skin: Skin is warm and dry. Capillary refill takes less than 2 seconds.   Psychiatric: She has a normal mood and affect. Her behavior is normal.   Vitals reviewed.      Significant Labs:  BMP:   Recent Labs  Lab 01/22/18  0703   GLU 64*   *   CO2 17*   BUN 62*   CREATININE 2.5*   CALCIUM 8.4*   MG 1.7     Cardiac Markers: No results for input(s): CKMB, TROPONINT, MYOGLOBIN in the last 168  hours.  CBC:   Recent Labs  Lab 01/22/18  0703   WBC 4.16   RBC 3.43*   HGB 8.8*   HCT 27.6*      MCV 81*   MCH 25.7*   MCHC 31.9*     CMP:   Recent Labs  Lab 01/22/18  0703   GLU 64*   CALCIUM 8.4*   ALBUMIN 2.4*   PROT 6.1      K 4.3   CO2 17*   *   BUN 62*   CREATININE 2.5*   ALKPHOS 73   ALT 13   AST 28   BILITOT 0.1     All labs within the past 24 hours have been reviewed.     Significant Imaging:  Labs: Reviewed

## 2018-01-22 NOTE — PLAN OF CARE
Problem: Patient Care Overview  Goal: Plan of Care Review  Outcome: Ongoing (interventions implemented as appropriate)  Patient AAOx4. Plan of care and all safety precautions maintained and discussed. BP elevated during the shift. PRN labetalol administered IV and scheduled blood pressure medications administered. SR on tele. Call bell in reach. Encouraged to call for assistance. Verbalized understanding. Will continue to monitor.

## 2018-01-22 NOTE — PROGRESS NOTES
1/22/17   345 pm pt returned my call   She states she is feeling well but her blood pressure continues to be elevated.  They have tried several medications as noted and she had similar reaction from procardia as the clonidine.  Patient confides that she has some concerns about her hospital stay which she has expressed to in patient case management for follow up.  She states that she has also been in contact with patient represenative  9 am chart reviewed in epic and noted that pt remains in hospital with uncontrolled hypertension and elevated cr.  Pt was evaluated by nephrology and plan to do renal ultrasound for FRAN.   Noted orders from dr. castle for the digital hypertension program but the banner on chart is not reset it still states declined.  Call to pt and she asked that I call her later this eveining  Advised pt that I will call her next week to follow up on status of the digital hypertension questionnaire was in the [facility]    Progress towards goals:      LT goal 1  Patient/caregiver will accept life style changes to manage and improve symptoms of chf prior to discharge from [facility]. - Priority: high  Short Term Goals  1.  Patient/caregiver will measure and record weights daily and notify doctor if patient gains more than 3 pounds in one day or 5 pounds in one week within 1 month.  Partially met            Pt was denies being off her sodium restricted diet                                                                                                    2.  Patient/Caregiver will verbalize importance of early intervention for symptoms of CHF and verbalize 2 ways of preventing complications due to disease process within 1 month       Partially met   Recognize and provide educational material (BRANDON).  Low - Salt Choices, Managing your Heart failure. Taking medicine to Control Heart Failure.    As above dr. castle placed the order for the digital hypertension program and pt will check [facility] after  discharge to home    LT goal 2   Patient/caregiver will have knowledge of resources/benefits available from [employer] managed care policy in order to obtain the services/benefits that patient is eligible for or needs prior to discharge from [facility].    Short Term Goals  1.  Patient/caregiver will verbalize understanding of appropriate use of [employer] health care benefits within 2 months.   In progress         Patient states she received her meals from [employer] last week  Sodium content is only 426 per serving.  Pt states her new cardiac plus plan is going into effect on jan 1 2018 and this will be a big help for her copay reduction with all the specialist she has to see  Pt states she got her papers from [employer] and she has faxed them to pcp for signature and verification of chronic condition.    Plan   Follow up on receipt of the digital hypertension program questionnaire   Order pt frozen meals on discharge  Continue to review the information mailed to pt  Follow up on wt and blood pressure  Follow up on start of walking on treadmill       Clinical Reference Documents Added to Patient Instructions       Document    DIET, DISCHARGE INSTRUCTIONS FOR EATING A LOW-SALT  (ENGLISH)    HEART FAILURE, CONGESTIVE (CHF) (ENGLISH)    HEART FAILURE, DISCHARGE INSTRUCTIONS FOR (ENGLISH)    HEART FAILURE: BEING ACTIVE (ENGLISH)    HEART FAILURE: MAKING CHANGES TO YOUR DIET (ENGLISH)    HEART FAILURE: TRACKING YOUR WEIGHT (ENGLISH)    HEART FAILURE: WARNING SIGNS OF A FLARE-UP (ENGLISH)    LOW-SALT CHOICES (ENGLISH)    MY HEART FAILURE SYMPTOMS CHART (ENGLISH)    SALT, TIPS FOR USING LESS (ENGLISH)

## 2018-01-22 NOTE — PROGRESS NOTES
[facility]  Nephrology  Progress Note    Patient Name: Ewelina Arnold  MRN: [id number]  Admission Date: 1/14/2018  Hospital Length of Stay: 8 days  Attending Provider: Emeka Martínez MD   Primary Care Physician: Kalie Walton MD  Principal Problem:Hypertensive emergency    Subjective:     HPI: Ms Ewelina Arnold is a pleasant 72 y.o. AA Female with a PMHx relevant for HTN, HLD, CKD satge 3 with solitary kidney due to kidney donation 1985, SLE CAD s/p PCI to LAD 2011, moderate AS, SLE on plaquenil, hx of partial colectomy for polyps and colon cancer (in remission), GERD, and hypothyroidism who admitted to [facility] with malignant HTN with complaint of dizziness and Headaches with nausea. She was found to have a SBP >230 and she was placed on a cardene drip. She presented with a sCr of 2.0 she has a baseline sCr of 1.8 - 2.0. Upon admission and improvement of her BP her sCr started to rise peaking at 2.4. Today her sCr is back to 2.0. She still is having difficulty in controlling her BP this plus MARYAM was the reason for Nephrology consult. She was prescribed clonidine by Cardiology but when she took the first dose of clonidine 1/10/18.  She began having dizziness, vertigo, headache and nausea for 2 days. In addition she c/o Headaches described as constant located in her occipital region. She is declining clonidine. She has a hx of having been taken off amlodipine due to ankle swelling.    Interval History:   Her SBP was > 200 today. Over the weekend she c/o Nifedipine causing  Nausea and edema. She states she cant function with Clonidine of Nifedipine.     Review of patient's allergies indicates:   Allergen Reactions    Ace inhibitors      Other reaction(s): Lips Swelling    Vioxx [rofecoxib]      Other reaction(s): lips swelling    Bactrim [sulfamethoxazole-trimethoprim]      Pt would like this medication to be added due to elevation of creatinine with single kidney.    Procardia [nifedipine]  Nausea Only and Edema     Feet swelling and nausea, now reports that this has happened in the past and required lasix    Arthrotec 50 [diclofenac-misoprostol]      Other reaction(s): Unknown    Bentyl [dicyclomine]      Not effective    Doxycycline      nausea    Imdur [isosorbide mononitrate] Nausea Only    Lomotil [diphenoxylate-atropine]      Stomach cramps, pt vomitted    Lozol [indapamide] Rash    Tramadol Nausea Only     Current Facility-Administered Medications   Medication Frequency    acetaminophen tablet 650 mg Q6H PRN    aspirin EC tablet 81 mg Daily    butalbital-acetaminophen-caffeine -40 mg per tablet 1 tablet Q4H PRN    cholestyramine 4 gram packet 4 g TID WM    citalopram tablet 20 mg Daily    cloNIDine tablet 0.1 mg Once    dextrose 50% injection 12.5 g PRN    dextrose 50% injection 25 g PRN    glucagon (human recombinant) injection 1 mg PRN    glucose chewable tablet 16 g PRN    glucose chewable tablet 24 g PRN    hydrALAZINE tablet 150 mg Q8H    hydroxychloroquine tablet 200 mg BID    irbesartan tablet 300 mg QHS    labetalol injection 10 mg Q6H PRN    labetalol injection 10 mg Once    labetalol injection 10 mg Q4H PRN    labetalol tablet 300 mg Q12H    latanoprost 0.005 % ophthalmic solution 1 drop Daily    levothyroxine tablet 75 mcg Before breakfast    nystatin-triamcinolone cream QID PRN    ondansetron disintegrating tablet 8 mg Q8H PRN    pantoprazole EC tablet 40 mg Daily    promethazine tablet 25 mg Q6H PRN    rosuvastatin tablet 40 mg QHS    sodium chloride 0.9% flush 3 mL PRN    torsemide tablet 20 mg BID       Objective:     Vital Signs (Most Recent):  Temp: 97.9 °F (36.6 °C) (01/22/18 1556)  Pulse: 62 (01/22/18 1556)  Resp: 18 (01/22/18 1556)  BP: (!) 195/84 (01/22/18 1556)  SpO2: 98 % (01/22/18 1556)  O2 Device (Oxygen Therapy): room air (01/22/18 1115) Vital Signs (24h Range):  Temp:  [97.9 °F (36.6 °C)-99 °F (37.2 °C)] 97.9 °F (36.6 °C)  Pulse:   [62-72] 62  Resp:  [16-18] 18  SpO2:  [95 %-98 %] 98 %  BP: (166-228)/(59-95) 195/84     Weight: 53 kg (116 lb 13.5 oz) (01/14/18 2104)  Body mass index is 19.44 kg/m².  Body surface area is 1.56 meters squared.    I/O last 3 completed shifts:  In: 980 [P.O.:980]  Out: -     Physical Exam   Constitutional: She is oriented to person, place, and time. She appears well-developed. No distress.   HENT:   Head: Normocephalic and atraumatic.   Eyes: Conjunctivae are normal. Pupils are equal, round, and reactive to light.   Neck: Trachea normal. Neck supple. No JVD present.   Cardiovascular: Normal rate, regular rhythm, S1 normal, S2 normal, intact distal pulses and normal pulses.  Exam reveals no gallop and no friction rub.    No murmur heard.  Pulmonary/Chest: Effort normal and breath sounds normal.   Abdominal: Soft. Bowel sounds are normal. She exhibits no distension. There is no tenderness.   Musculoskeletal: Normal range of motion.   Neurological: She is alert and oriented to person, place, and time.   Skin: Skin is warm and dry. Capillary refill takes less than 2 seconds.   Psychiatric: She has a normal mood and affect. Her behavior is normal.   Vitals reviewed.      Significant Labs:  BMP:   Recent Labs  Lab 01/22/18  0703   GLU 64*   *   CO2 17*   BUN 62*   CREATININE 2.5*   CALCIUM 8.4*   MG 1.7     Cardiac Markers: No results for input(s): CKMB, TROPONINT, MYOGLOBIN in the last 168 hours.  CBC:   Recent Labs  Lab 01/22/18  0703   WBC 4.16   RBC 3.43*   HGB 8.8*   HCT 27.6*      MCV 81*   MCH 25.7*   MCHC 31.9*     CMP:   Recent Labs  Lab 01/22/18  0703   GLU 64*   CALCIUM 8.4*   ALBUMIN 2.4*   PROT 6.1      K 4.3   CO2 17*   *   BUN 62*   CREATININE 2.5*   ALKPHOS 73   ALT 13   AST 28   BILITOT 0.1     All labs within the past 24 hours have been reviewed.     Significant Imaging:  Labs: Reviewed    Assessment/Plan:     MARYAM on CKD 4    MARYAM on CKD 4 suspect iATN from quick correction of  her BP on Nicardepene.     · sCr increased over the weekend but BP was high over the weekend, now trending down  · UPCr elevated 1.22 up from what had been ~ 0.6   · Reanal US/Doppler suspicious for FRAN  · Electrolytes not compatible with FRAN   · renin, aldosterone and renin/cydney ratio in process  · continue to monitor strict I/O's, daily weights, renally dose medications, and avoid nephrotoxic agents                Hypertension    · BP not well control, will continue to monitor. Goal for BP is <130 mmHg SBP and BDP <80 mmHg.   · She declines clonidine and Nifedipeine  · Will switch Carvedilol to Labetalol  · Increase Hydral;azine to 150 mg TID  · Cont Irbesartan 300 mg  · Cont Torsemide 20 mg BID  · Will look for secondary hyperaldosteronism in setting of suspected FRAN, pending labs          Lupus (systemic lupus erythematosus)    - UA with no evidence of blood   - UPCR has in creassed from ~0.6 to 1.22  - UA on 1/14/2018 with 2+ protein and positive for occult blood  - She has a hx of singe kidney biopsy is high risk            Thank you for your consult. I will follow-up with patient. Please contact us if you have any additional questions.    Harsh Benitez MD  Nephrology  [facility]      I have reviewed and concur with the fellow's history, physical, assessment, and plan. I have personally interviewed and examined the patient at bedside.

## 2018-01-23 LAB
ALBUMIN SERPL BCP-MCNC: 2.5 G/DL
ALDOST SERPL-MCNC: 20.5 NG/DL
ALDOST/RENIN PLAS-RTO: 205 RATIO
ALP SERPL-CCNC: 76 U/L
ALT SERPL W/O P-5'-P-CCNC: 14 U/L
ANION GAP SERPL CALC-SCNC: 9 MMOL/L
AST SERPL-CCNC: 30 U/L
BASOPHILS # BLD AUTO: 0.04 K/UL
BASOPHILS NFR BLD: 0.9 %
BILIRUB SERPL-MCNC: 0.1 MG/DL
BUN SERPL-MCNC: 61 MG/DL
CALCIUM SERPL-MCNC: 8.5 MG/DL
CHLORIDE SERPL-SCNC: 109 MMOL/L
CO2 SERPL-SCNC: 19 MMOL/L
CREAT SERPL-MCNC: 2.3 MG/DL
DIFFERENTIAL METHOD: ABNORMAL
EOSINOPHIL # BLD AUTO: 0.1 K/UL
EOSINOPHIL NFR BLD: 1.8 %
ERYTHROCYTE [DISTWIDTH] IN BLOOD BY AUTOMATED COUNT: 13.8 %
EST. GFR  (AFRICAN AMERICAN): 23.8 ML/MIN/1.73 M^2
EST. GFR  (NON AFRICAN AMERICAN): 20.6 ML/MIN/1.73 M^2
GLUCOSE SERPL-MCNC: 79 MG/DL
HCT VFR BLD AUTO: 27.7 %
HGB BLD-MCNC: 8.9 G/DL
IMM GRANULOCYTES # BLD AUTO: 0.01 K/UL
IMM GRANULOCYTES NFR BLD AUTO: 0.2 %
LYMPHOCYTES # BLD AUTO: 1.4 K/UL
LYMPHOCYTES NFR BLD: 31.8 %
MAGNESIUM SERPL-MCNC: 1.8 MG/DL
MCH RBC QN AUTO: 25.8 PG
MCHC RBC AUTO-ENTMCNC: 32.1 G/DL
MCV RBC AUTO: 80 FL
MONOCYTES # BLD AUTO: 0.8 K/UL
MONOCYTES NFR BLD: 18.6 %
NEUTROPHILS # BLD AUTO: 2.1 K/UL
NEUTROPHILS NFR BLD: 46.7 %
NRBC BLD-RTO: 0 /100 WBC
PHOSPHATE SERPL-MCNC: 4.7 MG/DL
PLATELET # BLD AUTO: 170 K/UL
PMV BLD AUTO: 11.3 FL
POTASSIUM SERPL-SCNC: 3.9 MMOL/L
PROT SERPL-MCNC: 6.3 G/DL
RBC # BLD AUTO: 3.45 M/UL
RENIN PLAS-CCNC: 0.1 NG/ML/HR
RENIN PLAS-CCNC: <0.6 NG/ML/H
SODIUM SERPL-SCNC: 137 MMOL/L
WBC # BLD AUTO: 4.4 K/UL

## 2018-01-23 PROCEDURE — 85025 COMPLETE CBC W/AUTO DIFF WBC: CPT

## 2018-01-23 PROCEDURE — 25000003 PHARM REV CODE 250: Performed by: NURSE PRACTITIONER

## 2018-01-23 PROCEDURE — 25000003 PHARM REV CODE 250: Performed by: HOSPITALIST

## 2018-01-23 PROCEDURE — 83735 ASSAY OF MAGNESIUM: CPT

## 2018-01-23 PROCEDURE — 99233 SBSQ HOSP IP/OBS HIGH 50: CPT | Mod: ,,, | Performed by: HOSPITALIST

## 2018-01-23 PROCEDURE — 99232 SBSQ HOSP IP/OBS MODERATE 35: CPT | Mod: ,,, | Performed by: INTERNAL MEDICINE

## 2018-01-23 PROCEDURE — 25000003 PHARM REV CODE 250: Performed by: INTERNAL MEDICINE

## 2018-01-23 PROCEDURE — 99222 1ST HOSP IP/OBS MODERATE 55: CPT | Mod: ,,, | Performed by: INTERNAL MEDICINE

## 2018-01-23 PROCEDURE — 36415 COLL VENOUS BLD VENIPUNCTURE: CPT

## 2018-01-23 PROCEDURE — 84100 ASSAY OF PHOSPHORUS: CPT

## 2018-01-23 PROCEDURE — 80053 COMPREHEN METABOLIC PANEL: CPT

## 2018-01-23 PROCEDURE — 11000001 HC ACUTE MED/SURG PRIVATE ROOM

## 2018-01-23 PROCEDURE — 25000003 PHARM REV CODE 250: Performed by: STUDENT IN AN ORGANIZED HEALTH CARE EDUCATION/TRAINING PROGRAM

## 2018-01-23 PROCEDURE — 83835 ASSAY OF METANEPHRINES: CPT

## 2018-01-23 RX ORDER — ALPRAZOLAM 0.25 MG/1
0.25 TABLET ORAL ONCE
Status: COMPLETED | OUTPATIENT
Start: 2018-01-23 | End: 2018-01-23

## 2018-01-23 RX ORDER — LABETALOL 200 MG/1
400 TABLET, FILM COATED ORAL EVERY 12 HOURS
Status: DISCONTINUED | OUTPATIENT
Start: 2018-01-23 | End: 2018-01-26 | Stop reason: HOSPADM

## 2018-01-23 RX ORDER — LATANOPROST 50 UG/ML
SOLUTION/ DROPS OPHTHALMIC
Qty: 3 BOTTLE | Refills: 3 | Status: SHIPPED | OUTPATIENT
Start: 2018-01-23 | End: 2019-01-02 | Stop reason: SDUPTHER

## 2018-01-23 RX ORDER — CLONIDINE HYDROCHLORIDE 0.2 MG/1
0.2 TABLET ORAL NIGHTLY
Status: DISCONTINUED | OUTPATIENT
Start: 2018-01-23 | End: 2018-01-26 | Stop reason: HOSPADM

## 2018-01-23 RX ADMIN — ACETAMINOPHEN 650 MG: 325 TABLET ORAL at 10:01

## 2018-01-23 RX ADMIN — CITALOPRAM HYDROBROMIDE 20 MG: 20 TABLET ORAL at 09:01

## 2018-01-23 RX ADMIN — HYDRALAZINE HYDROCHLORIDE 150 MG: 50 TABLET ORAL at 02:01

## 2018-01-23 RX ADMIN — HYDROXYCHLOROQUINE SULFATE 200 MG: 200 TABLET, FILM COATED ORAL at 09:01

## 2018-01-23 RX ADMIN — ACETAMINOPHEN 650 MG: 325 TABLET ORAL at 08:01

## 2018-01-23 RX ADMIN — LABETALOL HCL 400 MG: 200 TABLET, FILM COATED ORAL at 09:01

## 2018-01-23 RX ADMIN — PANTOPRAZOLE SODIUM 40 MG: 40 TABLET, DELAYED RELEASE ORAL at 09:01

## 2018-01-23 RX ADMIN — LATANOPROST 1 DROP: 50 SOLUTION OPHTHALMIC at 09:01

## 2018-01-23 RX ADMIN — LABETALOL HYDROCHLORIDE 10 MG: 5 INJECTION INTRAVENOUS at 04:01

## 2018-01-23 RX ADMIN — CHOLESTYRAMINE 4 G: 4 POWDER, FOR SUSPENSION ORAL at 05:01

## 2018-01-23 RX ADMIN — ASPIRIN 81 MG: 81 TABLET, COATED ORAL at 09:01

## 2018-01-23 RX ADMIN — LABETALOL HCL 400 MG: 200 TABLET, FILM COATED ORAL at 08:01

## 2018-01-23 RX ADMIN — ACETAMINOPHEN 650 MG: 325 TABLET ORAL at 05:01

## 2018-01-23 RX ADMIN — CHOLESTYRAMINE 4 G: 4 POWDER, FOR SUSPENSION ORAL at 06:01

## 2018-01-23 RX ADMIN — IRBESARTAN 300 MG: 300 TABLET ORAL at 08:01

## 2018-01-23 RX ADMIN — TORSEMIDE 20 MG: 20 TABLET ORAL at 09:01

## 2018-01-23 RX ADMIN — HYDRALAZINE HYDROCHLORIDE 150 MG: 50 TABLET ORAL at 11:01

## 2018-01-23 RX ADMIN — CLONIDINE HYDROCHLORIDE 0.2 MG: 0.2 TABLET ORAL at 08:01

## 2018-01-23 RX ADMIN — HYDROXYCHLOROQUINE SULFATE 200 MG: 200 TABLET, FILM COATED ORAL at 08:01

## 2018-01-23 RX ADMIN — TORSEMIDE 20 MG: 20 TABLET ORAL at 08:01

## 2018-01-23 RX ADMIN — ALPRAZOLAM 0.25 MG: 0.25 TABLET ORAL at 05:01

## 2018-01-23 RX ADMIN — LEVOTHYROXINE SODIUM 75 MCG: 75 TABLET ORAL at 05:01

## 2018-01-23 RX ADMIN — ROSUVASTATIN CALCIUM 40 MG: 20 TABLET, FILM COATED ORAL at 08:01

## 2018-01-23 RX ADMIN — HYDRALAZINE HYDROCHLORIDE 150 MG: 50 TABLET ORAL at 05:01

## 2018-01-23 NOTE — SUBJECTIVE & OBJECTIVE
Interval History:   BP was uncontrolled for most part of the night she received labetalol IV early this am with appropriate response. UO not quantify. sCr improving 2.3 this am.     Review of patient's allergies indicates:   Allergen Reactions    Ace inhibitors      Other reaction(s): Lips Swelling    Vioxx [rofecoxib]      Other reaction(s): lips swelling    Bactrim [sulfamethoxazole-trimethoprim]      Pt would like this medication to be added due to elevation of creatinine with single kidney.    Procardia [nifedipine] Nausea Only and Edema     Feet swelling and nausea, now reports that this has happened in the past and required lasix    Arthrotec 50 [diclofenac-misoprostol]      Other reaction(s): Unknown    Bentyl [dicyclomine]      Not effective    Doxycycline      nausea    Imdur [isosorbide mononitrate] Nausea Only    Lomotil [diphenoxylate-atropine]      Stomach cramps, pt vomitted    Lozol [indapamide] Rash    Tramadol Nausea Only     Current Facility-Administered Medications   Medication Frequency    acetaminophen tablet 650 mg Q6H PRN    aspirin EC tablet 81 mg Daily    butalbital-acetaminophen-caffeine -40 mg per tablet 1 tablet Q4H PRN    cholestyramine 4 gram packet 4 g TID WM    citalopram tablet 20 mg Daily    cloNIDine tablet 0.1 mg Once    dextrose 50% injection 12.5 g PRN    dextrose 50% injection 25 g PRN    glucagon (human recombinant) injection 1 mg PRN    glucose chewable tablet 16 g PRN    glucose chewable tablet 24 g PRN    hydrALAZINE tablet 150 mg Q8H    hydroxychloroquine tablet 200 mg BID    irbesartan tablet 300 mg QHS    labetalol injection 10 mg Q6H PRN    labetalol injection 10 mg Once    labetalol injection 10 mg Q4H PRN    labetalol tablet 400 mg Q12H    latanoprost 0.005 % ophthalmic solution 1 drop Daily    levothyroxine tablet 75 mcg Before breakfast    nystatin-triamcinolone cream QID PRN    ondansetron disintegrating tablet 8 mg Q8H PRN     pantoprazole EC tablet 40 mg Daily    promethazine tablet 25 mg Q6H PRN    rosuvastatin tablet 40 mg QHS    sodium chloride 0.9% flush 3 mL PRN    torsemide tablet 20 mg BID       Objective:     Vital Signs (Most Recent):  Temp: 97.7 °F (36.5 °C) (01/23/18 0325)  Pulse: 62 (01/23/18 0455)  Resp: 18 (01/23/18 0325)  BP: (!) 140/60 (01/23/18 0659)  SpO2: (!) 94 % (01/23/18 0325)  O2 Device (Oxygen Therapy): room air (01/23/18 0325) Vital Signs (24h Range):  Temp:  [97.7 °F (36.5 °C)-98.6 °F (37 °C)] 97.7 °F (36.5 °C)  Pulse:  [60-67] 62  Resp:  [16-20] 18  SpO2:  [94 %-98 %] 94 %  BP: (140-200)/(59-86) 140/60     Weight: 53 kg (116 lb 13.5 oz) (01/14/18 2104)  Body mass index is 19.44 kg/m².  Body surface area is 1.56 meters squared.    I/O last 3 completed shifts:  In: 1320 [P.O.:1320]  Out: -     Physical Exam   Constitutional: She is oriented to person, place, and time. She appears well-developed. No distress.   HENT:   Head: Normocephalic and atraumatic.   Eyes: Conjunctivae are normal. Pupils are equal, round, and reactive to light.   Neck: Trachea normal. Neck supple. No JVD present.   Cardiovascular: Normal rate, regular rhythm, S1 normal, S2 normal, intact distal pulses and normal pulses.  Exam reveals no gallop and no friction rub.    No murmur heard.  Pulmonary/Chest: Effort normal and breath sounds normal.   Abdominal: Soft. Bowel sounds are normal. She exhibits no distension. There is no tenderness.   Musculoskeletal: Normal range of motion.   Neurological: She is alert and oriented to person, place, and time.   Skin: Skin is warm and dry. Capillary refill takes less than 2 seconds.   Psychiatric: She has a normal mood and affect. Her behavior is normal.   Vitals reviewed.      Significant Labs:  BMP:     Recent Labs  Lab 01/23/18  0351   GLU 79      CO2 19*   BUN 61*   CREATININE 2.3*   CALCIUM 8.5*   MG 1.8     Cardiac Markers: No results for input(s): CKMB, TROPONINT, MYOGLOBIN in the last  168 hours.  CBC:     Recent Labs  Lab 01/22/18  0703   WBC 4.16   RBC 3.43*   HGB 8.8*   HCT 27.6*      MCV 81*   MCH 25.7*   MCHC 31.9*     CMP:     Recent Labs  Lab 01/23/18  0351   GLU 79   CALCIUM 8.5*   ALBUMIN 2.5*   PROT 6.3      K 3.9   CO2 19*      BUN 61*   CREATININE 2.3*   ALKPHOS 76   ALT 14   AST 30   BILITOT 0.1     All labs within the past 24 hours have been reviewed.     Significant Imaging:  Labs: Reviewed

## 2018-01-23 NOTE — ASSESSMENT & PLAN NOTE
Ms Arnold is a 73yo F with HTN, CKD 3 (solitary kidney), CAD s/p PCI, moderate AS, lupus, colon cancer in remission, who presents with resistant hypertension. Renal ultrasound has raised concerns of renal artery stenosis.    - continue current medical regimen of hydralazine 150mg TID, irbesartan 300mg daily, labetalol 400mg BID, torsemide BID  - discussed the addition of clonidine 0.2mg nightly with the patient, and she is willing to try this medication again, given its effectiveness previously; order for clonidine placed  - order placed for cardiology vascular lab to perform renal artery ultrasound, as an attempt to resolve prior inconclusive test evaluating for renal artery stenosis, especially given desire to avoid contrast dye with CKD

## 2018-01-23 NOTE — SUBJECTIVE & OBJECTIVE
Past Medical History:   Diagnosis Date    Acute on chronic diastolic congestive heart failure 11/17/2017    Allergy     Anatomical narrow angle glaucoma     Anemia     States diagnosed about a month ago    Aortic atherosclerosis     Arthritis     Cataract     CHF (congestive heart failure)     Chronic kidney disease     Chronic sciatica of left side     Right lower back pain due to sciatica    Coronary artery disease     Depression     Dry eyes     GERD (gastroesophageal reflux disease)     History of colon cancer     Hyperlipidemia     Hypertension     Hypothyroidism     Left ventricular diastolic dysfunction with preserved systolic function     Lupus (systemic lupus erythematosus) 8/17/2012    Malnutrition 5/16/2017    Osteoarthritis of cervical spine 8/17/2012    Osteopenia     PAD (peripheral artery disease)        Past Surgical History:   Procedure Laterality Date    CARDIAC CATHETERIZATION  07/27/2011    x1    CHOLECYSTECTOMY  1999    COLON SURGERY  2000 & 2003    partial removal    COLONOSCOPY N/A 4/14/2016    Procedure: COLONOSCOPY;  Surgeon: Jose Steen MD;  Location: [facility] ([address] [place] [address]);  Service: Endoscopy;  Laterality: N/A;  prep 2 days prior light meals only/clear liquid day before  and 4 dulcolax tabs (5 mgs) at noon    COLONOSCOPY N/A 9/14/2017    Procedure: COLONOSCOPY;  Surgeon: Jose Steen MD;  Location: [facility] ([address] [place] [address]);  Service: Endoscopy;  Laterality: N/A;    EYE SURGERY      HAND SURGERY Bilateral 1996 & 1997    due to carpal tunnel     HERNIA REPAIR      HYSTERECTOMY  1983    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    Procardia [nifedipine] Nausea Only and Edema     Feet swelling and nausea, now reports that this has happened in the past and required lasix    Arthrotec 50 [diclofenac-misoprostol]      Other reaction(s): Unknown    Bentyl [dicyclomine]      Not effective    Doxycycline      nausea    Imdur [isosorbide mononitrate] Nausea Only    Lomotil [diphenoxylate-atropine]      Stomach cramps, pt vomitted    Lozol [indapamide] Rash    Tramadol Nausea Only       No current facility-administered medications on file prior to encounter.      Current Outpatient Prescriptions on File Prior to Encounter   Medication Sig    butalbital-acetaminophen-caffeine -40 mg (FIORICET, ESGIC) -40 mg per tablet Take 1 tablet by mouth every 4 (four) hours as needed for Pain.    carvedilol (COREG) 12.5 MG tablet Take 1 tablet (12.5 mg total) by mouth 2 (two) times daily with meals.    citalopram (CELEXA) 20 MG tablet TAKE 1 TABLET (20 MG TOTAL) BY MOUTH ONCE DAILY.    hydroxychloroquine (PLAQUENIL) 200 mg tablet TAKE 1 TABLET TWICE DAILY    irbesartan (AVAPRO) 300 MG tablet Take 1 tablet (300 mg total) by mouth every evening.    levothyroxine (SYNTHROID) 75 MCG tablet Take 1 tablet (75 mcg total) by mouth before breakfast.    rosuvastatin (CRESTOR) 40 MG Tab TAKE 1 TABLET EVERY EVENING    acetaminophen (TYLENOL) 500 MG tablet Take 500 mg by mouth every 6 (six) hours as needed for Pain.    ammonium lactate 12 % Crea Apply 1 application topically 2 (two) times daily as needed.    aspirin (ECOTRIN) 81 MG EC tablet Take 1 tablet (81 mg total) by mouth once daily. (Patient taking differently: Take 81 mg by mouth. )    cholestyramine (QUESTRAN) 4 gram packet Take 1 packet (4 g total) by mouth 3 (three) times daily with meals.    conjugated estrogens  (PREMARIN) vaginal cream Use 1g nightly for two weeks, then 1g twice per week.    fexofenadine (ALLEGRA) 180 MG tablet Take 1 tablet (180 mg total) by mouth once daily. (Patient taking differently: Take 180 mg by mouth as needed. )    hyoscyamine (LEVSIN/SL) 0.125 mg Subl Place 1 tablet (0.125 mg total) under the tongue 2 (two) times daily as needed.    ipratropium (ATROVENT) 0.03 % nasal spray 2 sprays by Nasal route 2 (two) times daily as needed for Rhinitis.    multivitamin capsule Take 1 capsule by mouth once daily.    omeprazole (PRILOSEC) 40 MG capsule Take 1 capsule (40 mg total) by mouth once daily.    TENS units Sravanthi 1 application by Misc.(Non-Drug; Combo Route) route daily as needed (pain).    triamcinolone acetonide 0.1% (KENALOG) 0.1 % paste PLACE ONTO TEETH TWICE DAILY    VITAMIN D2 50,000 unit capsule TAKE 1 CAPSULE (50,000 UNITS TOTAL) EVERY 30 DAYS     Family History     Problem Relation (Age of Onset)    Diabetes Maternal Grandmother, Son, Maternal Aunt    Heart attack Mother    Heart disease Father    Hypertension Mother, Father, Sister, Brother    Kidney disease Brother    Kidney failure Brother    No Known Problems Daughter, Maternal Grandfather, Paternal Grandmother, Paternal Grandfather        Social History Main Topics    Smoking status: Former Smoker     Packs/day: 1.50     Years: 30.00     Types: Cigarettes     Start date: 1955     Quit date: 3/13/1985    Smokeless tobacco: Never Used    Alcohol use No    Drug use: No    Sexual activity: Not Currently     Partners: Male     Birth control/ protection: Abstinence     Review of Systems   Constitution: Negative for chills and fever.   HENT: Negative for congestion.    Eyes: Negative for blurred vision.   Cardiovascular: Negative for chest pain.   Respiratory: Negative for cough.    Hematologic/Lymphatic: Negative for bleeding problem.   Skin: Negative for poor wound healing and rash.   Musculoskeletal: Negative for falls.    Gastrointestinal: Negative for abdominal pain.   Genitourinary: Negative for dysuria.   Neurological: Negative for focal weakness.   Psychiatric/Behavioral: Negative for altered mental status.     Objective:     Vital Signs (Most Recent):  Temp: 98.5 °F (36.9 °C) (01/23/18 1159)  Pulse: 68 (01/23/18 1428)  Resp: 14 (01/23/18 1159)  BP: (!) 184/76 (01/23/18 1159)  SpO2: 97 % (01/23/18 1159) Vital Signs (24h Range):  Temp:  [97.7 °F (36.5 °C)-98.6 °F (37 °C)] 98.5 °F (36.9 °C)  Pulse:  [61-68] 68  Resp:  [14-20] 14  SpO2:  [94 %-98 %] 97 %  BP: (140-200)/(60-86) 184/76     Weight: 53 kg (116 lb 13.5 oz)  Body mass index is 19.44 kg/m².    SpO2: 97 %  O2 Device (Oxygen Therapy): room air      Intake/Output Summary (Last 24 hours) at 01/23/18 1501  Last data filed at 01/23/18 0900   Gross per 24 hour   Intake              240 ml   Output                0 ml   Net              240 ml       Lines/Drains/Airways     Peripheral Intravenous Line                 Peripheral IV - Single Lumen 01/22/18 0625 Left Hand 1 day                Physical Exam   Constitutional: She is oriented to person, place, and time. She appears well-developed and well-nourished. No distress.   HENT:   Head: Normocephalic and atraumatic.   Eyes: EOM are normal.   Neck: Normal range of motion. Neck supple. No JVD present.   Cardiovascular: Normal rate, regular rhythm, normal heart sounds and intact distal pulses.    Pulmonary/Chest: Effort normal and breath sounds normal. No respiratory distress. She has no wheezes. She has no rales.   Abdominal: Soft. Bowel sounds are normal. She exhibits no distension. There is no tenderness.   Musculoskeletal: Normal range of motion. She exhibits no edema.   Neurological: She is alert and oriented to person, place, and time.   Skin: Skin is warm and dry.   Psychiatric: She has a normal mood and affect. Her behavior is normal.       Significant Labs:   BMP:   Recent Labs  Lab 01/22/18  0703 01/23/18  0351   GLU  64* 79    137   K 4.3 3.9   * 109   CO2 17* 19*   BUN 62* 61*   CREATININE 2.5* 2.3*   CALCIUM 8.4* 8.5*   MG 1.7 1.8   , CMP   Recent Labs  Lab 01/22/18  0703 01/23/18  0351    137   K 4.3 3.9   * 109   CO2 17* 19*   GLU 64* 79   BUN 62* 61*   CREATININE 2.5* 2.3*   CALCIUM 8.4* 8.5*   PROT 6.1 6.3   ALBUMIN 2.4* 2.5*   BILITOT 0.1 0.1   ALKPHOS 73 76   AST 28 30   ALT 13 14   ANIONGAP 9 9   ESTGFRAFRICA 21.5* 23.8*   EGFRNONAA 18.6* 20.6*   , CBC   Recent Labs  Lab 01/22/18  0703 01/23/18  0351   WBC 4.16 4.40   HGB 8.8* 8.9*   HCT 27.6* 27.7*    170   , INR No results for input(s): INR, PROTIME in the last 48 hours., Lipid Panel No results for input(s): CHOL, HDL, LDLCALC, TRIG, CHOLHDL in the last 48 hours. and Troponin No results for input(s): TROPONINI in the last 48 hours.    Significant Imaging: Renal artery US: inconclusive, see full report

## 2018-01-23 NOTE — ASSESSMENT & PLAN NOTE
- UA with no evidence of blood   - UPCR has in creassed from ~0.6 to 1.22  - UA on 1/14/2018 with 2+ protein and positive for occult blood  - She has a hx of singe kidney biopsy is high risk  - Cont Plaquenil

## 2018-01-23 NOTE — CONSULTS
[facility]  Cardiology  Consult Note    Patient Name: Ewelina Arnold  MRN: [id number]  Admission Date: 1/14/2018  Hospital Length of Stay: 9 days  Code Status: Full Code   Attending Provider: Magdy Bella MD   Consulting Provider: Bryn Torres MD  Primary Care Physician: Kalie Walton MD  Principal Problem:Hypertensive emergency    Patient information was obtained from patient and ER records.     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    She reports that she was seen by her PCP 8 days prior to presentation. At that time, clonidine was added for hypertension. She felt dizzy with this medication, so she stopped taking it.     Upon arrival to the ED, her BP was >230. She was started on cardene gtt and admitted to MICU. A renal US was performed but was unable to locate origin of right renal artery. Additionally, there were dampened waveforms in segmental branches of RRA, raising concern of renal artery stenosis. Since hospitalization, her BP regimen has been up-titrated to include hydralazine TID, irbesartan, labetalol BID, torsemide BID.     Her most recent LHC was 12/2017 with 50% proximal LCx stenosis but otherwise normal coronary arteries. Most recent echo 11/2017 with LVEF 55% and moderate aortic stenosis.    Past Medical History:   Diagnosis Date    Acute on chronic diastolic congestive heart failure 11/17/2017    Allergy     Anatomical narrow angle glaucoma     Anemia     States diagnosed about a month ago    Aortic atherosclerosis     Arthritis     Cataract     CHF (congestive heart failure)     Chronic kidney disease     Chronic sciatica of left side     Right lower back pain due to  sciatica    Coronary artery disease     Depression     Dry eyes     GERD (gastroesophageal reflux disease)     History of colon cancer     Hyperlipidemia     Hypertension     Hypothyroidism     Left ventricular diastolic dysfunction with preserved systolic function     Lupus (systemic lupus erythematosus) 8/17/2012    Malnutrition 5/16/2017    Osteoarthritis of cervical spine 8/17/2012    Osteopenia     PAD (peripheral artery disease)        Past Surgical History:   Procedure Laterality Date    CARDIAC CATHETERIZATION  07/27/2011    x1    CHOLECYSTECTOMY  1999    COLON SURGERY  2000 & 2003    partial removal    COLONOSCOPY N/A 4/14/2016    Procedure: COLONOSCOPY;  Surgeon: Jose Steen MD;  Location: [facility] (4TH FLR);  Service: Endoscopy;  Laterality: N/A;  prep 2 days prior light meals only/clear liquid day before  and 4 dulcolax tabs (5 mgs) at noon    COLONOSCOPY N/A 9/14/2017    Procedure: COLONOSCOPY;  Surgeon: Jose Steen MD;  Location: [facility] (4TH FLR);  Service: Endoscopy;  Laterality: N/A;    EYE SURGERY      HAND SURGERY Bilateral 1996 & 1997    due to carpal tunnel     HERNIA REPAIR      HYSTERECTOMY  1983    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    Procardia [nifedipine] Nausea Only and Edema     Feet swelling and nausea, now reports that this has happened in the past and required lasix    Arthrotec 50 [diclofenac-misoprostol]      Other reaction(s): Unknown    Bentyl  [dicyclomine]      Not effective    Doxycycline      nausea    Imdur [isosorbide mononitrate] Nausea Only    Lomotil [diphenoxylate-atropine]      Stomach cramps, pt vomitted    Lozol [indapamide] Rash    Tramadol Nausea Only       No current facility-administered medications on file prior to encounter.      Current Outpatient Prescriptions on File Prior to Encounter   Medication Sig    butalbital-acetaminophen-caffeine -40 mg (FIORICET, ESGIC) -40 mg per tablet Take 1 tablet by mouth every 4 (four) hours as needed for Pain.    carvedilol (COREG) 12.5 MG tablet Take 1 tablet (12.5 mg total) by mouth 2 (two) times daily with meals.    citalopram (CELEXA) 20 MG tablet TAKE 1 TABLET (20 MG TOTAL) BY MOUTH ONCE DAILY.    hydroxychloroquine (PLAQUENIL) 200 mg tablet TAKE 1 TABLET TWICE DAILY    irbesartan (AVAPRO) 300 MG tablet Take 1 tablet (300 mg total) by mouth every evening.    levothyroxine (SYNTHROID) 75 MCG tablet Take 1 tablet (75 mcg total) by mouth before breakfast.    rosuvastatin (CRESTOR) 40 MG Tab TAKE 1 TABLET EVERY EVENING    acetaminophen (TYLENOL) 500 MG tablet Take 500 mg by mouth every 6 (six) hours as needed for Pain.    ammonium lactate 12 % Crea Apply 1 application topically 2 (two) times daily as needed.    aspirin (ECOTRIN) 81 MG EC tablet Take 1 tablet (81 mg total) by mouth once daily. (Patient taking differently: Take 81 mg by mouth. )    cholestyramine (QUESTRAN) 4 gram packet Take 1 packet (4 g total) by mouth 3 (three) times daily with meals.    conjugated estrogens (PREMARIN) vaginal cream Use 1g nightly for two weeks, then 1g twice per week.    fexofenadine (ALLEGRA) 180 MG tablet Take 1 tablet (180 mg total) by mouth once daily. (Patient taking differently: Take 180 mg by mouth as needed. )    hyoscyamine (LEVSIN/SL) 0.125 mg Subl Place 1 tablet (0.125 mg total) under the tongue 2 (two) times daily as needed.    ipratropium (ATROVENT) 0.03 % nasal spray 2  sprays by Nasal route 2 (two) times daily as needed for Rhinitis.    multivitamin capsule Take 1 capsule by mouth once daily.    omeprazole (PRILOSEC) 40 MG capsule Take 1 capsule (40 mg total) by mouth once daily.    TENS units Sravanthi 1 application by Misc.(Non-Drug; Combo Route) route daily as needed (pain).    triamcinolone acetonide 0.1% (KENALOG) 0.1 % paste PLACE ONTO TEETH TWICE DAILY    VITAMIN D2 50,000 unit capsule TAKE 1 CAPSULE (50,000 UNITS TOTAL) EVERY 30 DAYS     Family History     Problem Relation (Age of Onset)    Diabetes Maternal Grandmother, Son, Maternal Aunt    Heart attack Mother    Heart disease Father    Hypertension Mother, Father, Sister, Brother    Kidney disease Brother    Kidney failure Brother    No Known Problems Daughter, Maternal Grandfather, Paternal Grandmother, Paternal Grandfather        Social History Main Topics    Smoking status: Former Smoker     Packs/day: 1.50     Years: 30.00     Types: Cigarettes     Start date: 1955     Quit date: 3/13/1985    Smokeless tobacco: Never Used    Alcohol use No    Drug use: No    Sexual activity: Not Currently     Partners: Male     Birth control/ protection: Abstinence     Review of Systems   Constitution: Negative for chills and fever.   HENT: Negative for congestion.    Eyes: Negative for blurred vision.   Cardiovascular: Negative for chest pain.   Respiratory: Negative for cough.    Hematologic/Lymphatic: Negative for bleeding problem.   Skin: Negative for poor wound healing and rash.   Musculoskeletal: Negative for falls.   Gastrointestinal: Negative for abdominal pain.   Genitourinary: Negative for dysuria.   Neurological: Negative for focal weakness.   Psychiatric/Behavioral: Negative for altered mental status.     Objective:     Vital Signs (Most Recent):  Temp: 98.5 °F (36.9 °C) (01/23/18 1159)  Pulse: 68 (01/23/18 1428)  Resp: 14 (01/23/18 1159)  BP: (!) 184/76 (01/23/18 1159)  SpO2: 97 % (01/23/18 1159) Vital Signs (24h  Range):  Temp:  [97.7 °F (36.5 °C)-98.6 °F (37 °C)] 98.5 °F (36.9 °C)  Pulse:  [61-68] 68  Resp:  [14-20] 14  SpO2:  [94 %-98 %] 97 %  BP: (140-200)/(60-86) 184/76     Weight: 53 kg (116 lb 13.5 oz)  Body mass index is 19.44 kg/m².    SpO2: 97 %  O2 Device (Oxygen Therapy): room air      Intake/Output Summary (Last 24 hours) at 01/23/18 1501  Last data filed at 01/23/18 0900   Gross per 24 hour   Intake              240 ml   Output                0 ml   Net              240 ml       Lines/Drains/Airways     Peripheral Intravenous Line                 Peripheral IV - Single Lumen 01/22/18 0625 Left Hand 1 day                Physical Exam   Constitutional: She is oriented to person, place, and time. She appears well-developed and well-nourished. No distress.   HENT:   Head: Normocephalic and atraumatic.   Eyes: EOM are normal.   Neck: Normal range of motion. Neck supple. No JVD present.   Cardiovascular: Normal rate, regular rhythm, normal heart sounds and intact distal pulses.    Pulmonary/Chest: Effort normal and breath sounds normal. No respiratory distress. She has no wheezes. She has no rales.   Abdominal: Soft. Bowel sounds are normal. She exhibits no distension. There is no tenderness.   Musculoskeletal: Normal range of motion. She exhibits no edema.   Neurological: She is alert and oriented to person, place, and time.   Skin: Skin is warm and dry.   Psychiatric: She has a normal mood and affect. Her behavior is normal.       Significant Labs:   BMP:   Recent Labs  Lab 01/22/18  0703 01/23/18  0351   GLU 64* 79    137   K 4.3 3.9   * 109   CO2 17* 19*   BUN 62* 61*   CREATININE 2.5* 2.3*   CALCIUM 8.4* 8.5*   MG 1.7 1.8   , CMP   Recent Labs  Lab 01/22/18  0703 01/23/18  0351    137   K 4.3 3.9   * 109   CO2 17* 19*   GLU 64* 79   BUN 62* 61*   CREATININE 2.5* 2.3*   CALCIUM 8.4* 8.5*   PROT 6.1 6.3   ALBUMIN 2.4* 2.5*   BILITOT 0.1 0.1   ALKPHOS 73 76   AST 28 30   ALT 13 14   ANIONGAP  9 9   ESTGFRAFRICA 21.5* 23.8*   EGFRNONAA 18.6* 20.6*   , CBC   Recent Labs  Lab 01/22/18  0703 01/23/18  0351   WBC 4.16 4.40   HGB 8.8* 8.9*   HCT 27.6* 27.7*    170   , INR No results for input(s): INR, PROTIME in the last 48 hours., Lipid Panel No results for input(s): CHOL, HDL, LDLCALC, TRIG, CHOLHDL in the last 48 hours. and Troponin No results for input(s): TROPONINI in the last 48 hours.    Significant Imaging: Renal artery US: inconclusive, see full report    Assessment and Plan:     Hypertension    Ms Arnold is a 71yo F with HTN, CKD 3 (solitary kidney), CAD s/p PCI, moderate AS, lupus, colon cancer in remission, who presents with resistant hypertension. Renal ultrasound has raised concerns of renal artery stenosis.    - continue current medical regimen of hydralazine 150mg TID, irbesartan 300mg daily, labetalol 400mg BID, torsemide BID  - discussed the addition of clonidine 0.2mg nightly with the patient, and she is willing to try this medication again, given its effectiveness previously; order for clonidine placed  - order placed for cardiology vascular lab to perform renal artery ultrasound, as an attempt to resolve prior inconclusive test evaluating for renal artery stenosis, especially given desire to avoid contrast dye with CKD            VTE Risk Mitigation         Ordered     Medium Risk of VTE  Once      01/14/18 0814          Thank you for your consult. I will follow-up with patient. Please contact us if you have any additional questions.    Bryn Torres MD  Cardiology   [facility]

## 2018-01-23 NOTE — ASSESSMENT & PLAN NOTE
· BP not well control, will continue to monitor. Goal for BP is <130 mmHg SBP and BDP <80 mmHg.   · She declines clonidine and Nifedipeine  · Increase labetalol to 400 mg BID  · Cont Hydral;azine to 150 mg TID  · Cont Irbesartan 300 mg  · Cont Torsemide 20 mg BID  · Will look for secondary hyperaldosteronism in setting of suspected FRAN, pending labs

## 2018-01-23 NOTE — PROGRESS NOTES
[facility]  Nephrology  Progress Note    Patient Name: Ewelina Arnold  MRN: [id number]  Admission Date: 1/14/2018  Hospital Length of Stay: 9 days  Attending Provider: Magdy Bella MD   Primary Care Physician: Kalie Walton MD  Principal Problem:Hypertensive emergency    Subjective:     HPI: Ms Ewelina Arnold is a pleasant 72 y.o. AA Female with a PMHx relevant for HTN, HLD, CKD satge 3 with solitary kidney due to kidney donation 1985, SLE CAD s/p PCI to LAD 2011, moderate AS, SLE on plaquenil, hx of partial colectomy for polyps and colon cancer (in remission), GERD, and hypothyroidism who admitted to [facility] with malignant HTN with complaint of dizziness and Headaches with nausea. She was found to have a SBP >230 and she was placed on a cardene drip. She presented with a sCr of 2.0 she has a baseline sCr of 1.8 - 2.0. Upon admission and improvement of her BP her sCr started to rise peaking at 2.4. Today her sCr is back to 2.0. She still is having difficulty in controlling her BP this plus MARYAM was the reason for Nephrology consult. She was prescribed clonidine by Cardiology but when she took the first dose of clonidine 1/10/18.  She began having dizziness, vertigo, headache and nausea for 2 days. In addition she c/o Headaches described as constant located in her occipital region. She is declining clonidine. She has a hx of having been taken off amlodipine due to ankle swelling.    Interval History:   BP was uncontrolled for most part of the night she received labetalol IV early this am with appropriate response. UO not quantify. sCr improving 2.3 this am.     Review of patient's allergies indicates:   Allergen Reactions    Ace inhibitors      Other reaction(s): Lips Swelling    Vioxx [rofecoxib]      Other reaction(s): lips swelling    Bactrim [sulfamethoxazole-trimethoprim]      Pt would like this medication to be added due to elevation of creatinine with single kidney.    Procardia  [nifedipine] Nausea Only and Edema     Feet swelling and nausea, now reports that this has happened in the past and required lasix    Arthrotec 50 [diclofenac-misoprostol]      Other reaction(s): Unknown    Bentyl [dicyclomine]      Not effective    Doxycycline      nausea    Imdur [isosorbide mononitrate] Nausea Only    Lomotil [diphenoxylate-atropine]      Stomach cramps, pt vomitted    Lozol [indapamide] Rash    Tramadol Nausea Only     Current Facility-Administered Medications   Medication Frequency    acetaminophen tablet 650 mg Q6H PRN    aspirin EC tablet 81 mg Daily    butalbital-acetaminophen-caffeine -40 mg per tablet 1 tablet Q4H PRN    cholestyramine 4 gram packet 4 g TID WM    citalopram tablet 20 mg Daily    cloNIDine tablet 0.1 mg Once    dextrose 50% injection 12.5 g PRN    dextrose 50% injection 25 g PRN    glucagon (human recombinant) injection 1 mg PRN    glucose chewable tablet 16 g PRN    glucose chewable tablet 24 g PRN    hydrALAZINE tablet 150 mg Q8H    hydroxychloroquine tablet 200 mg BID    irbesartan tablet 300 mg QHS    labetalol injection 10 mg Q6H PRN    labetalol injection 10 mg Once    labetalol injection 10 mg Q4H PRN    labetalol tablet 400 mg Q12H    latanoprost 0.005 % ophthalmic solution 1 drop Daily    levothyroxine tablet 75 mcg Before breakfast    nystatin-triamcinolone cream QID PRN    ondansetron disintegrating tablet 8 mg Q8H PRN    pantoprazole EC tablet 40 mg Daily    promethazine tablet 25 mg Q6H PRN    rosuvastatin tablet 40 mg QHS    sodium chloride 0.9% flush 3 mL PRN    torsemide tablet 20 mg BID       Objective:     Vital Signs (Most Recent):  Temp: 97.7 °F (36.5 °C) (01/23/18 0325)  Pulse: 62 (01/23/18 0455)  Resp: 18 (01/23/18 0325)  BP: (!) 140/60 (01/23/18 0659)  SpO2: (!) 94 % (01/23/18 0325)  O2 Device (Oxygen Therapy): room air (01/23/18 0325) Vital Signs (24h Range):  Temp:  [97.7 °F (36.5 °C)-98.6 °F (37 °C)] 97.7 °F  (36.5 °C)  Pulse:  [60-67] 62  Resp:  [16-20] 18  SpO2:  [94 %-98 %] 94 %  BP: (140-200)/(59-86) 140/60     Weight: 53 kg (116 lb 13.5 oz) (01/14/18 2104)  Body mass index is 19.44 kg/m².  Body surface area is 1.56 meters squared.    I/O last 3 completed shifts:  In: 1320 [P.O.:1320]  Out: -     Physical Exam   Constitutional: She is oriented to person, place, and time. She appears well-developed. No distress.   HENT:   Head: Normocephalic and atraumatic.   Eyes: Conjunctivae are normal. Pupils are equal, round, and reactive to light.   Neck: Trachea normal. Neck supple. No JVD present.   Cardiovascular: Normal rate, regular rhythm, S1 normal, S2 normal, intact distal pulses and normal pulses.  Exam reveals no gallop and no friction rub.    No murmur heard.  Pulmonary/Chest: Effort normal and breath sounds normal.   Abdominal: Soft. Bowel sounds are normal. She exhibits no distension. There is no tenderness.   Musculoskeletal: Normal range of motion.   Neurological: She is alert and oriented to person, place, and time.   Skin: Skin is warm and dry. Capillary refill takes less than 2 seconds.   Psychiatric: She has a normal mood and affect. Her behavior is normal.   Vitals reviewed.      Significant Labs:  BMP:     Recent Labs  Lab 01/23/18  0351   GLU 79      CO2 19*   BUN 61*   CREATININE 2.3*   CALCIUM 8.5*   MG 1.8     Cardiac Markers: No results for input(s): CKMB, TROPONINT, MYOGLOBIN in the last 168 hours.  CBC:     Recent Labs  Lab 01/22/18  0703   WBC 4.16   RBC 3.43*   HGB 8.8*   HCT 27.6*      MCV 81*   MCH 25.7*   MCHC 31.9*     CMP:     Recent Labs  Lab 01/23/18  0351   GLU 79   CALCIUM 8.5*   ALBUMIN 2.5*   PROT 6.3      K 3.9   CO2 19*      BUN 61*   CREATININE 2.3*   ALKPHOS 76   ALT 14   AST 30   BILITOT 0.1     All labs within the past 24 hours have been reviewed.     Significant Imaging:  Labs: Reviewed    Assessment/Plan:     MARYAM on CKD 4    MARYAM on CKD 4 suspect iATN from  quick correction of her BP on Nicardepene.     · sCr increased over the weekend but BP was high over the weekend, sCr improving today 2.3  · UPCr elevated 1.22 up from what had been ~ 0.6   · Reanal US/Doppler suspicious for FRAN  · Electrolytes not compatible with FRAN   · Aldosterone 31.6, Renin level and ratio pending  · Will check for plasma Metanephrines.  · continue to monitor strict I/O's, daily weights, renally dose medications, and avoid nephrotoxic agents                Chronic kidney disease, stage IV (severe)    CKD stage 4 followed by Dr. Ruffin in nephrology clinic   - will need follow up with nephrology upon dc  -see above        Hypertension    · BP not well control, will continue to monitor. Goal for BP is <130 mmHg SBP and BDP <80 mmHg.   · She declines clonidine and Nifedipeine  · Increase labetalol to 400 mg BID  · Cont Hydral;azine to 150 mg TID  · Cont Irbesartan 300 mg  · Cont Torsemide 20 mg BID  · Will look for secondary hyperaldosteronism in setting of suspected FRAN, pending labs          Lupus (systemic lupus erythematosus)    - UA with no evidence of blood   - UPCR has in creassed from ~0.6 to 1.22  - UA on 1/14/2018 with 2+ protein and positive for occult blood  - She has a hx of singe kidney biopsy is high risk  - Cont Plaquenil            Thank you for your consult. I will follow-up with patient. Please contact us if you have any additional questions.    Harsh Benitez MD  Nephrology  [facility]        I have reviewed and concur with the Resident's history, physical, assessment, and plan. I have personally interviewed and examined the patient at bedside. I discussed with Dr. Ruffin ( Nephrology) and Dr. Wyman  About her resistant HTN . We will try to optimize her regimen .

## 2018-01-23 NOTE — PROGRESS NOTES
Progress Note  Hospital Medicine    Admit Date: 1/14/2018  Length of Stay:  LOS: 9 days     SUBJECTIVE:         Follow-up For:  Hypertensive emergency    HPI/Interval history (See H&P for complete P,F,SHx) :     1/23/18   s/p cardiology evaluation.  willing to try clonidine nightly again,vascular lab to perform renal artery ultrasound, due to prior inconclusive test evaluating for renal artery stenosis,Aldosterone 31.6, plasma Metanephrines, Renin level and ratio pending. received one dose of xanax for anxiety      Review of Systems: List if applicable  Pain scale: 0/10  Constitutional- Positive for Weakness, anxiety  Head ache  denies vertigo    OBJECTIVE:     Vital Signs Range (Last 24H):  Temp:  [97.7 °F (36.5 °C)-98.6 °F (37 °C)]   Pulse:  [61-68]   Resp:  [14-20]   BP: (140-194)/(60-90)   SpO2:  [94 %-98 %]     Physical Exam:  General- Patient alert and oriented x3   HEENT- PERRLA, EOMI, OP clear  Neck- No JVD, Lymphadenopathy, Thyromegaly  CV- Regular rate and rhythm, No Murmur/ruma/rubs  Resp- Lungs CTA Bilaterally, No increased WOB  Abdomen- Non tender/non-distended, BS normoactive x4 quads, no HSM  Extrem- No cyanosis, clubbing, edema.   Skin- No rashes, lesions, ulcers  Neuro- able to move upper and lower extremities without limitation      Medications:  Medication list was reviewed and changes noted under Assessment/Plan.      Current Facility-Administered Medications:     acetaminophen tablet 650 mg, 650 mg, Oral, Q6H PRN, Emeka Martínez MD, 650 mg at 01/23/18 1047    ALPRAZolam tablet 0.25 mg, 0.25 mg, Oral, Once, Magdy Bella MD    aspirin EC tablet 81 mg, 81 mg, Oral, Daily, Magdy Bella MD, 81 mg at 01/23/18 0943    butalbital-acetaminophen-caffeine -40 mg per tablet 1 tablet, 1 tablet, Oral, Q4H PRN, Emeka Martínez MD, 1 tablet at 01/22/18 2052    cholestyramine 4 gram packet 4 g, 1 packet, Oral, TID WM, Magdy Bella MD, 4 g at 01/23/18 0659    citalopram tablet  20 mg, 20 mg, Oral, Daily, Fernanda Ann MD, 20 mg at 01/23/18 0943    cloNIDine tablet 0.2 mg, 0.2 mg, Oral, QHS, Bryn Torres MD    dextrose 50% injection 12.5 g, 12.5 g, Intravenous, PRN, Fernanda Ann MD    dextrose 50% injection 25 g, 25 g, Intravenous, PRN, Fernanda Ann MD    glucagon (human recombinant) injection 1 mg, 1 mg, Intramuscular, PRN, Fernanda Ann MD    glucose chewable tablet 16 g, 16 g, Oral, PRN, Fernanda Ann MD    glucose chewable tablet 24 g, 24 g, Oral, PRN, Fernanda Ann MD    hydrALAZINE tablet 150 mg, 150 mg, Oral, Q8H, Harsh Benitez MD, 150 mg at 01/23/18 1415    hydroxychloroquine tablet 200 mg, 200 mg, Oral, BID, Fernanda Ann MD, 200 mg at 01/23/18 0942    irbesartan tablet 300 mg, 300 mg, Oral, QHS, Shari Brock NP, 300 mg at 01/22/18 2042    labetalol injection 10 mg, 10 mg, Intravenous, Q6H PRN, Magdy Bella MD, 10 mg at 01/23/18 0455    labetalol injection 10 mg, 10 mg, Intravenous, Once, Magdy Bella MD    labetalol injection 10 mg, 10 mg, Intravenous, Q4H PRN, Emeka Martínez MD, 10 mg at 01/22/18 1732    labetalol tablet 400 mg, 400 mg, Oral, Q12H, Harsh Benitez MD, 400 mg at 01/23/18 0942    latanoprost 0.005 % ophthalmic solution 1 drop, 1 drop, Both Eyes, Daily, Magdy Bella MD, 1 drop at 01/23/18 0944    levothyroxine tablet 75 mcg, 75 mcg, Oral, Before breakfast, Fernanda Ann MD, 75 mcg at 01/23/18 0504    nystatin-triamcinolone cream, , Topical (Top), QID PRN, Chandan Holt PA-C    ondansetron disintegrating tablet 8 mg, 8 mg, Oral, Q8H PRN, Fernanda Ann MD, 8 mg at 01/21/18 0515    pantoprazole EC tablet 40 mg, 40 mg, Oral, Daily, Fernanda Ann MD, 40 mg at 01/23/18 0943    promethazine tablet 25 mg, 25 mg, Oral, Q6H PRN, Fernanda Ann MD    rosuvastatin tablet 40 mg, 40 mg, Oral, QHS, Fernanda Ann MD, 40 mg at 01/22/18 2044    sodium chloride 0.9% flush 3  mL, 3 mL, Intravenous, PRN, Fernanda Ann MD    torsemide tablet 20 mg, 20 mg, Oral, BID, Emeka Martínez MD, 20 mg at 01/23/18 0943    acetaminophen, butalbital-acetaminophen-caffeine -40 mg, dextrose 50%, dextrose 50%, glucagon (human recombinant), glucose, glucose, labetalol, labetalol, nystatin-triamcinolone, ondansetron, promethazine, sodium chloride 0.9%    Laboratory/Diagnostic Data:  Reviewed and noted in plan where applicable- Please see chart for full lab data.      Recent Labs  Lab 01/21/18  0452 01/22/18  0703 01/23/18  0351   WBC 4.23 4.16 4.40   HGB 8.3* 8.8* 8.9*   HCT 25.9* 27.6* 27.7*    159 170         Recent Labs  Lab 01/21/18 0452 01/22/18  0703 01/23/18  0351   * 137 137   K 4.3 4.3 3.9    111* 109   CO2 18* 17* 19*   BUN 66* 62* 61*   CREATININE 2.8* 2.5* 2.3*   GLU 73 64* 79   CALCIUM 8.1* 8.4* 8.5*   MG 1.8 1.7 1.8   PHOS 5.4* 4.5 4.7*         Recent Labs  Lab 01/21/18 0452 01/22/18  0703 01/23/18  0351   ALKPHOS 65 73 76   ALT 13 13 14   AST 26 28 30   ALBUMIN 2.4* 2.4* 2.5*   PROT 5.9* 6.1 6.3   BILITOT 0.2 0.1 0.1        Microbiology labs for the last week  Microbiology Results (last 7 days)     ** No results found for the last 168 hours. **           Imaging Results          US Renal Artery Stenosis Hyperten (xpd) (Final result)     Abnormal  Result time 01/18/18 19:13:43    Final result by Daksha Arevalo MD (01/18/18 19:13:43)                 Impression:         1. The origin of the right renal artery is not definitely visualized and there are questionable tardus parvus waveforms within the segmental branches of the right renal artery, which can be seen in setting of renal artery stenosis.  CTA abdomen pelvis is recommended to evaluate the renal artery ostium.    2. Sequela of medical renal disease.    3. Patient is status post left nephrectomy.    EPIC notification activated.      ______________________________________     Electronically signed by  resident: HERSON KHOURY MD  Date:     01/18/18  Time:    18:49            As the supervising and teaching physician, I personally reviewed the images and resident's interpretation and I agree with the findings.      ______________________________________     Electronically signed by resident: HERSON KHOURY MD  Date:     01/18/18  Time:    19:06            As the supervising and teaching physician, I personally reviewed the images and resident's interpretation and I agree with the findings.          Electronically signed by: TWAN CLARK MD  Date:     01/18/18  Time:    19:13              Narrative:    Time of Procedure: 01/18/18 17:50:00  Accession # [id number].    HISTORY: Hypertension    TECHNIQUE: Doppler ultrasound of the kidneys was performed.    COMPARISON: CT abdomen and pelvis with contrast on 3/8/2017    FINDINGS:      RIGHT: The right kidney measures 11.8 cm and demonstrates mild increased cortical echogenicity with no evidence of cortical thinning.  No nephrolithiasis or solid renal masses.  Initial images demonstrate mild right-sided hydronephrosis that resolves after urinary bladder emptying.  Perfusion to the right kidney is mildly decreased.  Resistive indices are elevated as follow: Upper segmental artery 0.76, middle segmental artery 0.70, and lower segmental artery 0.80. The main renal artery is not definitely visualized at its origin.   The maximum velocity of the right renal artery measures 64 cm/s with a maximum renal artery to aortic ratio of 0.6. The segmental branches of the right renal artery demonstrate possible tardus parvus waveform.     LEFT: Patient status post left nephrectomy.                             X-Ray Chest 1 View (Final result)  Result time 01/15/18 09:25:06    Final result by Roseline Zhu MD (01/15/18 09:25:06)                 Impression:      Interval diuresis with resolution of pulmonary edema.  However a small amount of RIGHT subpulmonic pleural fluid persists,  creating a juxtaphrenic peak sign where it inserts into the caudal margin of the RIGHT inferior accessory fissure.    No new disease.      Electronically signed by: Roseline Zhu MD  Date:     01/15/18  Time:    09:25              Narrative:    Time of Procedure: 01/15/18 05:50:00  Accession # [id number]    Comparison: 11/27/2017.    Number of views: 1.    Findings:  Chest radiograph performed 11/27/2017 revealed extensive interstitial pulmonary edema plus dependent RIGHT pleural fluid and some consolidation in the lateral aspect the RIGHT lower lung zone.    There is been marked improvement in these abnormalities on today's study.  There is still a small amount of dependent RIGHT pleural fluid, well appreciated because it inserts into the inferior aspect of the RIGHT inferior accessory fissure creating a juxtaphrenic peak sign.  However there is no evidence of pulmonary edema and no LEFT pleural fluid.    There is no airspace disease, pneumothorax, pneumomediastinum, pneumoperitoneum or significant osseous abnormality.  Metallic hardware projects over the upper abdomen similar to the earlier study.                             MRI Brain Without Contrast (Final result)  Result time 01/14/18 13:10:30    Final result by Janeth Mcgarry MD (01/14/18 13:10:30)                 Impression:        No acute abnormality.  ______________________________________     Electronically signed by resident: THOR SARMIENTO MD  Date:     01/14/18  Time:    12:48            As the supervising and teaching physician, I personally reviewed the images and resident's interpretation and I agree with the findings.          Electronically signed by: JANETH MCGARRY MD  Date:     01/14/18  Time:    13:10              Narrative:    CLINICAL INDICATION: 72 year old F with severe vertigo in setting of hypertensive emergency     TECHNIQUE: Routine MRI brain without contrast. Multiplanar multisequence MR imaging of the brain was performed  "without intravenous contrast.    COMPARISON: CT head 1/14/17    FINDINGS:    Intracranial compartment:    Ventricles and sulci are normal in size for age without evidence of hydrocephalus. No extra-axial blood or fluid collections.    The brain parenchyma demonstrate mild chronic microvascular ischemic change in the supratentorial white matter. No mass lesion, acute hemorrhage, edema or acute infarct.    Normal vascular flow voids are preserved.      Skull/extracranial contents (limited evaluation): Bone marrow signal intensity is normal.                             CT Head Without Contrast (Final result)  Result time 01/14/18 06:57:56    Final result by Gayle Aquino MD (01/14/18 06:57:56)                 Impression:      1.  No CT evidence of acute intracranial abnormality. Clinical correlation and further evaluation as warrented.     2.  Mild generalized cerebral volume loss and microangiopathic change, similar to prior examination.      Electronically signed by: GAYLE AQUINO  Date:     01/14/18  Time:    06:57              Narrative:    TECHNIQUE:  Multiple contiguous axial images of the brain were obtained from base to the vertex without the use of intravenous contrast. Sagittal and coronal reconstruction images were formatted in postprocessing.    COMPARISON:    Head CT 9/20/2017    FINDINGS:      There is no acute intracranial hemorrhage, hydrocephalus, midline shift or mass effect. There is mild generalized cerebral volume loss and mild bilateral deep cerebral white matter microangiopathic changes.  Gray-white matter differentiation otherwise is maintained. The basal cisterns are patent. The mastoid air cells and paranasal sinuses are clear of acute process. The visualized bones of the calvarium demonstrate no acute osseous abnormality.                              Estimated body mass index is 19.44 kg/m² as calculated from the following:    Height as of this encounter: 5' 5" (1.651 m).    Weight as of this " encounter: 53 kg (116 lb 13.5 oz).    I & O (Last 24H):    Intake/Output Summary (Last 24 hours) at 01/23/18 1713  Last data filed at 01/23/18 1500   Gross per 24 hour   Intake              480 ml   Output              600 ml   Net             -120 ml       Estimated Creatinine Clearance: 18.5 mL/min (based on SCr of 2.3 mg/dL (H)).    ASSESSMENT/PLAN:     Active Problems:    Active Hospital Problems    Diagnosis  POA    *Hypertensive emergency [I16.1]Off Necardipine drip l likely rebound HTN from stopping clonidine.SBP 190s. does not want to take amlodipine due to history of pedal edema. BP - 194/60 .continue current  regimen of hydralazine 150mg TID, irbesartan 300mg daily, labetalol 400mg BID, torsemide BID.s/p cardiology evaluatin.  willing to try clonidine nightly again,vascular lab to perform renal artery ultrasound, due to prior inconclusive test evaluating for renal artery stenosis,Aldosterone 31.6, plasma Metanephrines, Renin level and ratio pending  Yes    Aortic stenosis [I35.0]Moderate aortic stenosis, PABLITO = 0.93 cm2  Unknown    Cor pulmonale, chronic [I27.81]Pulmonary hypertension. The estimated PA systolic pressure is 68 mmHg.   Unknown    Chronic kidney disease, stage IV (severe) [N18.4]follow up with nephrology   Unknown    Vertigo [R42]denies vertigo, only had lightheadedness. MRI and CT brain -No acute abnormality.  Yes    Dizzy [R42]resolved as above  Yes    Anemia associated with chronic renal failure [D63.1]Hb 8.9 . Normocytic   Yes    CKD (chronic kidney disease) [N18.9]4- Solitary kidney. seems to be at baseline, follows up with nephrology.Followed by Dr. Ruffin in nephrology  Yes    Hyperlipidemia [E78.5]on rosuvastatin and cholestyramine  Yes    CAD (coronary artery disease) [I25.10]Asymptomatic. continue  low dose ASA.    Chronic diastolic heart failure - Well compensated. Continue daily weights and torsemide 20mg BID.  PRN weight gain/leg swelling/SOB  Yes     Chronic    Lupus  (systemic lupus erythematosus) [M32.9].Continue HCQ daily  .UA with 2+ protein and positive for occult blood. She has a hx of singe kidney biopsy is high risk  Yes     Chronic     Early 1990's developed acute respiratory failure and spent weeks in ICU [facility]   Positive CARYN, SSB  Hx Rash, leukopenia,         Resolved Hospital Problems    Diagnosis Date Resolved POA   No resolved problems to display.         Disposition- Home    DVT prophylaxis addressed with: B/L SCD            Magdy Bella MD  Attending Staff Physician  [facility]  pager- [phone]  Spectralink - [phone]

## 2018-01-23 NOTE — ASSESSMENT & PLAN NOTE
MARYAM on CKD 4 suspect iATN from quick correction of her BP on Nicardepene.     · sCr increased over the weekend but BP was high over the weekend, sCr improving today 2.3  · UPCr elevated 1.22 up from what had been ~ 0.6   · Reanal US/Doppler suspicious for FRAN  · Electrolytes not compatible with FRAN   · Aldosterone 31.6, Renin level and ratio pending  · Will check for plasma Metanephrines.  · continue to monitor strict I/O's, daily weights, renally dose medications, and avoid nephrotoxic agents

## 2018-01-23 NOTE — HPI
Ms Arnold is a 71yo F with HTN, HLD, CKD 3 (solitary kidney after donating), CAD s/p PCI to LAD 2011, moderate aortic stenosis, lupus, partial colectomy and colon cancer in remission, who presented on 1/14 with dizziness and hypertension with SBP 230mmHg.    She reports that she was seen by her PCP 8 days prior to presentation. At that time, clonidine was added for hypertension. She felt dizzy with this medication, so she stopped taking it.     Upon arrival to the ED, her BP was >230. She was started on cardene gtt and admitted to MICU. A renal US was performed but was unable to locate origin of right renal artery. Additionally, there were dampened waveforms in segmental branches of RRA, raising concern of renal artery stenosis. Since hospitalization, her BP regimen has been up-titrated to include hydralazine TID, irbesartan, labetalol BID, torsemide BID.     Her most recent LHC was 12/2017 with 50% proximal LCx stenosis but otherwise normal coronary arteries. Most recent echo 11/2017 with LVEF 55% and moderate aortic stenosis.

## 2018-01-24 LAB
ALBUMIN SERPL BCP-MCNC: 2.4 G/DL
ALP SERPL-CCNC: 77 U/L
ALT SERPL W/O P-5'-P-CCNC: 16 U/L
ANION GAP SERPL CALC-SCNC: 8 MMOL/L
AST SERPL-CCNC: 36 U/L
BASOPHILS # BLD AUTO: 0.02 K/UL
BASOPHILS NFR BLD: 0.7 %
BILIRUB SERPL-MCNC: 0.2 MG/DL
BUN SERPL-MCNC: 57 MG/DL
CALCIUM SERPL-MCNC: 8.5 MG/DL
CHLORIDE SERPL-SCNC: 109 MMOL/L
CO2 SERPL-SCNC: 19 MMOL/L
CREAT SERPL-MCNC: 2.5 MG/DL
DIFFERENTIAL METHOD: ABNORMAL
EOSINOPHIL # BLD AUTO: 0.1 K/UL
EOSINOPHIL NFR BLD: 3.8 %
ERYTHROCYTE [DISTWIDTH] IN BLOOD BY AUTOMATED COUNT: 13.9 %
EST. GFR  (AFRICAN AMERICAN): 21.5 ML/MIN/1.73 M^2
EST. GFR  (NON AFRICAN AMERICAN): 18.6 ML/MIN/1.73 M^2
GLUCOSE SERPL-MCNC: 71 MG/DL
HCT VFR BLD AUTO: 28.4 %
HGB BLD-MCNC: 8.7 G/DL
IMM GRANULOCYTES # BLD AUTO: 0.01 K/UL
IMM GRANULOCYTES NFR BLD AUTO: 0.3 %
LYMPHOCYTES # BLD AUTO: 1.1 K/UL
LYMPHOCYTES NFR BLD: 37 %
MAGNESIUM SERPL-MCNC: 1.6 MG/DL
MCH RBC QN AUTO: 24.9 PG
MCHC RBC AUTO-ENTMCNC: 30.6 G/DL
MCV RBC AUTO: 81 FL
MONOCYTES # BLD AUTO: 0.5 K/UL
MONOCYTES NFR BLD: 15.4 %
NEUTROPHILS # BLD AUTO: 1.3 K/UL
NEUTROPHILS NFR BLD: 42.8 %
NRBC BLD-RTO: 0 /100 WBC
PHOSPHATE SERPL-MCNC: 5.2 MG/DL
PLATELET # BLD AUTO: 164 K/UL
PMV BLD AUTO: 11 FL
POTASSIUM SERPL-SCNC: 4.2 MMOL/L
PROT SERPL-MCNC: 6.1 G/DL
RBC # BLD AUTO: 3.49 M/UL
SODIUM SERPL-SCNC: 136 MMOL/L
WBC # BLD AUTO: 2.92 K/UL

## 2018-01-24 PROCEDURE — 93975 VASCULAR STUDY: CPT

## 2018-01-24 PROCEDURE — 25000003 PHARM REV CODE 250: Performed by: STUDENT IN AN ORGANIZED HEALTH CARE EDUCATION/TRAINING PROGRAM

## 2018-01-24 PROCEDURE — 25000003 PHARM REV CODE 250: Performed by: HOSPITALIST

## 2018-01-24 PROCEDURE — 80053 COMPREHEN METABOLIC PANEL: CPT

## 2018-01-24 PROCEDURE — 25000003 PHARM REV CODE 250: Performed by: INTERNAL MEDICINE

## 2018-01-24 PROCEDURE — 86225 DNA ANTIBODY NATIVE: CPT

## 2018-01-24 PROCEDURE — 63600175 PHARM REV CODE 636 W HCPCS: Performed by: HOSPITALIST

## 2018-01-24 PROCEDURE — 86160 COMPLEMENT ANTIGEN: CPT

## 2018-01-24 PROCEDURE — 99232 SBSQ HOSP IP/OBS MODERATE 35: CPT | Mod: ,,, | Performed by: INTERNAL MEDICINE

## 2018-01-24 PROCEDURE — 99232 SBSQ HOSP IP/OBS MODERATE 35: CPT | Mod: GC,,, | Performed by: INTERNAL MEDICINE

## 2018-01-24 PROCEDURE — 97803 MED NUTRITION INDIV SUBSEQ: CPT

## 2018-01-24 PROCEDURE — 99233 SBSQ HOSP IP/OBS HIGH 50: CPT | Mod: ,,, | Performed by: HOSPITALIST

## 2018-01-24 PROCEDURE — 36415 COLL VENOUS BLD VENIPUNCTURE: CPT

## 2018-01-24 PROCEDURE — 93975 VASCULAR STUDY: CPT | Mod: 26,,, | Performed by: INTERNAL MEDICINE

## 2018-01-24 PROCEDURE — 11000001 HC ACUTE MED/SURG PRIVATE ROOM

## 2018-01-24 PROCEDURE — 86160 COMPLEMENT ANTIGEN: CPT | Mod: 59

## 2018-01-24 PROCEDURE — 84100 ASSAY OF PHOSPHORUS: CPT

## 2018-01-24 PROCEDURE — 83735 ASSAY OF MAGNESIUM: CPT

## 2018-01-24 PROCEDURE — 85025 COMPLETE CBC W/AUTO DIFF WBC: CPT

## 2018-01-24 RX ORDER — HYDRALAZINE HYDROCHLORIDE 50 MG/1
50 TABLET, FILM COATED ORAL EVERY 8 HOURS
Status: DISCONTINUED | OUTPATIENT
Start: 2018-01-24 | End: 2018-01-26 | Stop reason: HOSPADM

## 2018-01-24 RX ORDER — ACETAMINOPHEN 10 MG/ML
1000 INJECTION, SOLUTION INTRAVENOUS ONCE
Status: COMPLETED | OUTPATIENT
Start: 2018-01-24 | End: 2018-01-24

## 2018-01-24 RX ORDER — IRBESARTAN 300 MG/1
300 TABLET ORAL DAILY
Status: DISCONTINUED | OUTPATIENT
Start: 2018-01-25 | End: 2018-01-26 | Stop reason: HOSPADM

## 2018-01-24 RX ADMIN — HYDRALAZINE HYDROCHLORIDE 150 MG: 50 TABLET ORAL at 05:01

## 2018-01-24 RX ADMIN — CHOLESTYRAMINE 4 G: 4 POWDER, FOR SUSPENSION ORAL at 01:01

## 2018-01-24 RX ADMIN — LATANOPROST 1 DROP: 50 SOLUTION OPHTHALMIC at 10:01

## 2018-01-24 RX ADMIN — LABETALOL HCL 400 MG: 200 TABLET, FILM COATED ORAL at 10:01

## 2018-01-24 RX ADMIN — CHOLESTYRAMINE 4 G: 4 POWDER, FOR SUSPENSION ORAL at 06:01

## 2018-01-24 RX ADMIN — LEVOTHYROXINE SODIUM 75 MCG: 75 TABLET ORAL at 05:01

## 2018-01-24 RX ADMIN — ACETAMINOPHEN 1000 MG: 10 INJECTION, SOLUTION INTRAVENOUS at 12:01

## 2018-01-24 RX ADMIN — CLONIDINE HYDROCHLORIDE 0.2 MG: 0.2 TABLET ORAL at 10:01

## 2018-01-24 RX ADMIN — ACETAMINOPHEN 650 MG: 325 TABLET ORAL at 05:01

## 2018-01-24 RX ADMIN — HYDRALAZINE HYDROCHLORIDE 50 MG: 50 TABLET ORAL at 09:01

## 2018-01-24 RX ADMIN — HYDRALAZINE HYDROCHLORIDE 50 MG: 50 TABLET ORAL at 03:01

## 2018-01-24 RX ADMIN — LABETALOL HCL 400 MG: 200 TABLET, FILM COATED ORAL at 09:01

## 2018-01-24 RX ADMIN — ROSUVASTATIN CALCIUM 40 MG: 20 TABLET, FILM COATED ORAL at 09:01

## 2018-01-24 RX ADMIN — HYDROXYCHLOROQUINE SULFATE 200 MG: 200 TABLET, FILM COATED ORAL at 10:01

## 2018-01-24 RX ADMIN — TORSEMIDE 20 MG: 20 TABLET ORAL at 10:01

## 2018-01-24 RX ADMIN — HYDROXYCHLOROQUINE SULFATE 200 MG: 200 TABLET, FILM COATED ORAL at 09:01

## 2018-01-24 RX ADMIN — TORSEMIDE 20 MG: 20 TABLET ORAL at 09:01

## 2018-01-24 RX ADMIN — CITALOPRAM HYDROBROMIDE 20 MG: 20 TABLET ORAL at 10:01

## 2018-01-24 RX ADMIN — ASPIRIN 81 MG: 81 TABLET, COATED ORAL at 10:01

## 2018-01-24 NOTE — PROGRESS NOTES
[facility]  Adult Nutrition  Consult Note    SUMMARY     Recommendations    1. Encourage <75% PO intake.   2. Boost Breeze ONS ordered for adequate nutrition.   3. RD to monitor and follow-up  .  Goals: PO intake >75% EEN, EPN  Nutrition Goal Status: new  Communication of RD Recs: reviewed with RN    Reason for Assessment    Reason for Assessment: length of stay  Diagnosis: other (see comments) (Chronic renal failure)  Relevent Medical History: HTN, HLD, CAD, CKD   Interdisciplinary Rounds: did not attend    General Information Comments: Pt SOCO for vasc US    Nutrition Discharge Planning: PO intake >85% EEN, EPN    Nutrition Prescription Ordered    Current Diet Order: NPO  Oral Nutrition Supplement: Boost Breeze     Evaluation of Received Nutrients/Fluid Intake    Energy Calories Required: not meeting needs  Protein Required: not meeting needs     I/O: -80 ml    Intake/Output Summary (Last 24 hours) at 01/24/18 1312  Last data filed at 01/24/18 0559   Gross per 24 hour   Intake              240 ml   Output             1200 ml   Net             -960 ml       Fluid Required: other (see comments) (Per MD)     % Intake of Estimated Energy Needs: 25 - 50 %  % Meal Intake: 50%     Nutrition Risk Screen     Nutrition Risk Screen: PEE    Nutrition/Diet History    Patient Reported Diet/Restrictions/Preferences: other (see comments) (EPE)  Typical Food/Fluid Intake: PEE  Food Preferences: PEE  Factors Affecting Nutritional Intake: other (see comments) (PEE)    Labs/Tests/Procedures/Meds    Pertinent Labs Reviewed: reviewed  Pertinent Labs Comments: WBC 2.92, Hgb 8.7, HCT 28.4, BUN 57, CO2 19, Cr 2.5 , GFR 18.6, Ca 8.5, K 5.2, Alb 2.4   Pertinent Medications Reviewed: reviewed  Pertinent Medications Comments: Statin, Torsemide, Hydrochlorothiazide     Physical Findings    Overall Physical Appearance: other (see comments) (PEE)    Oral/Mouth Cavity: other (see comments) (PEE)  Skin: other (see comments)  "([facility])    Anthropometrics    Height: 5' 5" (165.1 cm)  Weight Method: Bed Scale  Weight: 53 kg (116 lb 13.5 oz)    Ideal Body Weight (IBW), Female: 125 lb  % Ideal Body Weight, Female (lb): 93.47 lb     BMI (Calculated): 19.5  BMI Grade: 18.5-24.9 - normal    Estimated/Assessed Needs    Weight Used For Calorie Calculations: 53 kg (116 lb 13.5 oz)     Energy Calorie Requirements (kcal): 9909-5850 (25-30 kcal/kg)  Energy Need Method: Kcal/kg  RMR ([place]-St. Jeor Equation): 1040.88    Weight Used For Protein Calculations: 53 kg (116 lb 13.5 oz)  Protein Requirements: 53-80 g (1.0-1.5 g/kg)    Fluid Need Method: RDA Method (Per MD or 1 ml/kcal)  RDA Method (mL): 1325    Assessment and Plan    Anemia associated with chronic renal failure    Problem:  Increased energy needs    Related to (etiology):   Dx of CKD     Signs and Symptoms (as evidenced by):   GFR 18.6 and creatinine 2.5      Interventions/Recommendations (treatment strategy):  See recs above    Nutrition Diagnosis Status:   New              Monitor and Evaluation    Food and Nutrient Intake: energy intake, food and beverage intake  Food and Nutrient Adminstration: diet order  Knowledge/Beliefs/Attitudes: food and nutrition knowledge/skill, beliefs and attitudes  Physical Activity and Function: nutrition-related ADLs and IADLs  Anthropometric Measurements: height/length, weight, weight change, body mass index  Biochemical Data, Medical Tests and Procedures: electrolyte and renal panel, gastrointestinal profile, glucose/endocrine profile, inflammatory profile, lipid profile  Nutrition-Focused Physical Findings: overall appearance    Nutrition Risk    Level of Risk: other (see comments) (2x/week)    Nutrition Follow-Up    RD Follow-up?: Yes    Chapin Guerra  Dietetic Intern    "

## 2018-01-24 NOTE — NURSING
Pt resting on couch at bedside. Pt appears anxious. Previously requested medication to help with anxiety. Prn xanax given PO per MD orders. Daughter at bedside. Will monitor.

## 2018-01-24 NOTE — SUBJECTIVE & OBJECTIVE
Past Medical History:   Diagnosis Date    Acute on chronic diastolic congestive heart failure 11/17/2017    Allergy     Anatomical narrow angle glaucoma     Anemia     States diagnosed about a month ago    Aortic atherosclerosis     Arthritis     Cataract     CHF (congestive heart failure)     Chronic kidney disease     Chronic sciatica of left side     Right lower back pain due to sciatica    Coronary artery disease     Depression     Dry eyes     GERD (gastroesophageal reflux disease)     History of colon cancer     Hyperlipidemia     Hypertension     Hypothyroidism     Left ventricular diastolic dysfunction with preserved systolic function     Lupus (systemic lupus erythematosus) 8/17/2012    Malnutrition 5/16/2017    Osteoarthritis of cervical spine 8/17/2012    Osteopenia     PAD (peripheral artery disease)        Past Surgical History:   Procedure Laterality Date    CARDIAC CATHETERIZATION  07/27/2011    x1    CHOLECYSTECTOMY  1999    COLON SURGERY  2000 & 2003    partial removal    COLONOSCOPY N/A 4/14/2016    Procedure: COLONOSCOPY;  Surgeon: Jose Steen MD;  Location: [facility] ([address] [place] [address]);  Service: Endoscopy;  Laterality: N/A;  prep 2 days prior light meals only/clear liquid day before  and 4 dulcolax tabs (5 mgs) at noon    COLONOSCOPY N/A 9/14/2017    Procedure: COLONOSCOPY;  Surgeon: Jose Steen MD;  Location: [facility] ([address] [place] [address]);  Service: Endoscopy;  Laterality: N/A;    EYE SURGERY      HAND SURGERY Bilateral 1996 & 1997    due to carpal tunnel     HERNIA REPAIR      HYSTERECTOMY  1983    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    Procardia [nifedipine] Nausea Only and Edema     Feet swelling and nausea, now reports that this has happened in the past and required lasix    Arthrotec 50 [diclofenac-misoprostol]      Other reaction(s): Unknown    Bentyl [dicyclomine]      Not effective    Doxycycline      nausea    Imdur [isosorbide mononitrate] Nausea Only    Lomotil [diphenoxylate-atropine]      Stomach cramps, pt vomitted    Lozol [indapamide] Rash    Tramadol Nausea Only       No current facility-administered medications on file prior to encounter.      Current Outpatient Prescriptions on File Prior to Encounter   Medication Sig    butalbital-acetaminophen-caffeine -40 mg (FIORICET, ESGIC) -40 mg per tablet Take 1 tablet by mouth every 4 (four) hours as needed for Pain.    carvedilol (COREG) 12.5 MG tablet Take 1 tablet (12.5 mg total) by mouth 2 (two) times daily with meals.    citalopram (CELEXA) 20 MG tablet TAKE 1 TABLET (20 MG TOTAL) BY MOUTH ONCE DAILY.    hydroxychloroquine (PLAQUENIL) 200 mg tablet TAKE 1 TABLET TWICE DAILY    irbesartan (AVAPRO) 300 MG tablet Take 1 tablet (300 mg total) by mouth every evening.    levothyroxine (SYNTHROID) 75 MCG tablet Take 1 tablet (75 mcg total) by mouth before breakfast.    rosuvastatin (CRESTOR) 40 MG Tab TAKE 1 TABLET EVERY EVENING    acetaminophen (TYLENOL) 500 MG tablet Take 500 mg by mouth every 6 (six) hours as needed for Pain.    ammonium lactate 12 % Crea Apply 1 application topically 2 (two) times daily as needed.    aspirin (ECOTRIN) 81 MG EC tablet Take 1 tablet (81 mg total) by mouth once daily. (Patient taking differently: Take 81 mg by mouth. )    cholestyramine (QUESTRAN) 4 gram packet Take 1 packet (4 g total) by mouth 3 (three) times daily with meals.    conjugated estrogens  (PREMARIN) vaginal cream Use 1g nightly for two weeks, then 1g twice per week.    fexofenadine (ALLEGRA) 180 MG tablet Take 1 tablet (180 mg total) by mouth once daily. (Patient taking differently: Take 180 mg by mouth as needed. )    hyoscyamine (LEVSIN/SL) 0.125 mg Subl Place 1 tablet (0.125 mg total) under the tongue 2 (two) times daily as needed.    ipratropium (ATROVENT) 0.03 % nasal spray 2 sprays by Nasal route 2 (two) times daily as needed for Rhinitis.    multivitamin capsule Take 1 capsule by mouth once daily.    omeprazole (PRILOSEC) 40 MG capsule Take 1 capsule (40 mg total) by mouth once daily.    TENS units Sravanthi 1 application by Misc.(Non-Drug; Combo Route) route daily as needed (pain).    triamcinolone acetonide 0.1% (KENALOG) 0.1 % paste PLACE ONTO TEETH TWICE DAILY    VITAMIN D2 50,000 unit capsule TAKE 1 CAPSULE (50,000 UNITS TOTAL) EVERY 30 DAYS     Family History     Problem Relation (Age of Onset)    Diabetes Maternal Grandmother, Son, Maternal Aunt    Heart attack Mother    Heart disease Father    Hypertension Mother, Father, Sister, Brother    Kidney disease Brother    Kidney failure Brother    No Known Problems Daughter, Maternal Grandfather, Paternal Grandmother, Paternal Grandfather        Social History Main Topics    Smoking status: Former Smoker     Packs/day: 1.50     Years: 30.00     Types: Cigarettes     Start date: 1955     Quit date: 3/13/1985    Smokeless tobacco: Never Used    Alcohol use No    Drug use: No    Sexual activity: Not Currently     Partners: Male     Birth control/ protection: Abstinence     ROS  Objective:     Vital Signs (Most Recent):  Temp: 97.8 °F (36.6 °C) (01/24/18 0756)  Pulse: 64 (01/24/18 1026)  Resp: 18 (01/24/18 0756)  BP: (!) 153/64 (01/24/18 0756)  SpO2: 97 % (01/24/18 0756) Vital Signs (24h Range):  Temp:  [97.8 °F (36.6 °C)-98.5 °F (36.9 °C)] 97.8 °F (36.6 °C)  Pulse:  [64-74] 64  Resp:  [14-20] 18  SpO2:  [94 %-98 %] 97 %  BP:  (114228)/() 153/64     Weight: 53 kg (116 lb 13.5 oz)  Body mass index is 19.44 kg/m².    SpO2: 97 %  O2 Device (Oxygen Therapy): room air      Intake/Output Summary (Last 24 hours) at 01/24/18 1127  Last data filed at 01/24/18 0559   Gross per 24 hour   Intake              240 ml   Output             1200 ml   Net             -960 ml       Lines/Drains/Airways     Peripheral Intravenous Line                 Peripheral IV - Single Lumen 01/24/18 0200 Right Forearm less than 1 day                Physical Exam   Constitutional: She is oriented to person, place, and time. She appears well-developed and well-nourished. No distress.   HENT:   Head: Normocephalic and atraumatic.   Eyes: EOM are normal.   Neck: No JVD present.   Cardiovascular: Normal rate, regular rhythm and normal heart sounds.    Pulmonary/Chest: Breath sounds normal.   Abdominal: Soft. Bowel sounds are normal.   Musculoskeletal: Normal range of motion. She exhibits no edema.   Neurological: She is alert and oriented to person, place, and time.   Skin: Skin is warm and dry.   Psychiatric: She has a normal mood and affect. Her behavior is normal.       Significant Labs:   BMP:   Recent Labs  Lab 01/23/18  0351 01/24/18  0443   GLU 79 71    136   K 3.9 4.2    109   CO2 19* 19*   BUN 61* 57*   CREATININE 2.3* 2.5*   CALCIUM 8.5* 8.5*   MG 1.8 1.6   , CMP   Recent Labs  Lab 01/23/18  0351 01/24/18  0443    136   K 3.9 4.2    109   CO2 19* 19*   GLU 79 71   BUN 61* 57*   CREATININE 2.3* 2.5*   CALCIUM 8.5* 8.5*   PROT 6.3 6.1   ALBUMIN 2.5* 2.4*   BILITOT 0.1 0.2   ALKPHOS 76 77   AST 30 36   ALT 14 16   ANIONGAP 9 8   ESTGFRAFRICA 23.8* 21.5*   EGFRNONAA 20.6* 18.6*   , CBC   Recent Labs  Lab 01/23/18  0351 01/24/18  0443   WBC 4.40 2.92*   HGB 8.9* 8.7*   HCT 27.7* 28.4*    164   , INR No results for input(s): INR, PROTIME in the last 48 hours., Lipid Panel No results for input(s): CHOL, HDL, LDLCALC, TRIG, CHOLHDL in  the last 48 hours. and Troponin No results for input(s): TROPONINI in the last 48 hours.

## 2018-01-24 NOTE — ASSESSMENT & PLAN NOTE
Problem:  Increased energy needs    Related to (etiology):   Dx of CKD     Signs and Symptoms (as evidenced by):   GFR 18.6 and creatinine 2.5      Interventions/Recommendations (treatment strategy):  See recs above    Nutrition Diagnosis Status:   New

## 2018-01-24 NOTE — PROGRESS NOTES
Progress Note  Hospital Medicine    Admit Date: 1/14/2018  Length of Stay:  LOS: 10 days     SUBJECTIVE:         Follow-up For:  Hypertensive emergency    HPI/Interval history (See H&P for complete P,F,SHx) :     1/23/18   s/p cardiology evaluation.  willing to try clonidine nightly again,vascular lab to perform renal artery ultrasound, due to prior inconclusive test evaluating for renal artery stenosis,Aldosterone 31.6, plasma Metanephrines  pending. received one dose of xanax for anxiety    1/24/18  Aldosterone/renin ratio elevated at 205. Endocrine consulted      Review of Systems: List if applicable  Pain scale: 8/10  Constitutional- Positive for occipital headache, Weakness, anxiety    denies vertigo    OBJECTIVE:     Vital Signs Range (Last 24H):  Temp:  [97.8 °F (36.6 °C)-98.5 °F (36.9 °C)]   Pulse:  [64-74]   Resp:  [18-20]   BP: (114-228)/()   SpO2:  [94 %-98 %]     Physical Exam:  General- Patient alert and oriented x3   HEENT- PERRLA, EOMI, OP clear  Neck- No JVD, Lymphadenopathy, Thyromegaly  CV- Regular rate and rhythm, No Murmur/ruma/rubs  Resp- Lungs CTA Bilaterally, No increased WOB  Abdomen- Non tender/non-distended, BS normoactive x4 quads, no HSM  Extrem- No cyanosis, clubbing, edema.   Skin- No rashes, lesions, ulcers  Neuro- able to move upper and lower extremities without limitation      Medications:  Medication list was reviewed and changes noted under Assessment/Plan.      Current Facility-Administered Medications:     acetaminophen tablet 650 mg, 650 mg, Oral, Q6H PRN, Emeka Martínez MD, 650 mg at 01/24/18 0556    aspirin EC tablet 81 mg, 81 mg, Oral, Daily, Magdy Bella MD, 81 mg at 01/24/18 1017    butalbital-acetaminophen-caffeine -40 mg per tablet 1 tablet, 1 tablet, Oral, Q4H PRN, Emeka Martínez MD, 1 tablet at 01/22/18 2052    cholestyramine 4 gram packet 4 g, 1 packet, Oral, TID WM, Magdy Bella MD, 4 g at 01/23/18 1728    citalopram tablet 20 mg, 20  mg, Oral, Daily, Fernanda Ann MD, 20 mg at 01/24/18 1010    cloNIDine tablet 0.2 mg, 0.2 mg, Oral, QHS, Bryn Torres MD, 0.2 mg at 01/23/18 2027    dextrose 50% injection 12.5 g, 12.5 g, Intravenous, PRN, Fernanda Ann MD    dextrose 50% injection 25 g, 25 g, Intravenous, PRN, Fernanda Ann MD    glucagon (human recombinant) injection 1 mg, 1 mg, Intramuscular, PRN, Fernanda Ann MD    glucose chewable tablet 16 g, 16 g, Oral, PRN, Fernanda Ann MD    glucose chewable tablet 24 g, 24 g, Oral, PRN, Fernanda Ann MD    hydrALAZINE tablet 150 mg, 150 mg, Oral, Q8H, Harsh Benitez MD, 150 mg at 01/24/18 0556    hydroxychloroquine tablet 200 mg, 200 mg, Oral, BID, Fernanda Ann MD, 200 mg at 01/24/18 1012    irbesartan tablet 300 mg, 300 mg, Oral, QHS, Shari ROBERTSON Daroniive, NP, 300 mg at 01/23/18 2027    labetalol injection 10 mg, 10 mg, Intravenous, Q6H PRN, Magdy Bella MD, 10 mg at 01/23/18 0455    labetalol injection 10 mg, 10 mg, Intravenous, Once, Magdy Bella MD    labetalol injection 10 mg, 10 mg, Intravenous, Q4H PRN, Emeka Martínez MD, 10 mg at 01/22/18 1732    labetalol tablet 400 mg, 400 mg, Oral, Q12H, Harsh Benitez MD, 400 mg at 01/24/18 1010    latanoprost 0.005 % ophthalmic solution 1 drop, 1 drop, Both Eyes, Daily, Magdy Bella MD, 1 drop at 01/24/18 1029    levothyroxine tablet 75 mcg, 75 mcg, Oral, Before breakfast, Fernanda Ann MD, 75 mcg at 01/24/18 0556    nystatin-triamcinolone cream, , Topical (Top), QID PRN, Chandan Holt PA-C    ondansetron disintegrating tablet 8 mg, 8 mg, Oral, Q8H PRN, Fernanda Ann MD, 8 mg at 01/21/18 0515    pantoprazole EC tablet 40 mg, 40 mg, Oral, Daily, Fernanda Ann MD, 40 mg at 01/23/18 0943    promethazine tablet 25 mg, 25 mg, Oral, Q6H PRN, Fernanda Ann MD    rosuvastatin tablet 40 mg, 40 mg, Oral, QHS, Fernanda Ann MD, 40 mg at 01/23/18 2029    sodium chloride  0.9% flush 3 mL, 3 mL, Intravenous, PRN, Fernanda Ann MD    torsemide tablet 20 mg, 20 mg, Oral, BID, Emeka Martínez MD, 20 mg at 01/24/18 1011    acetaminophen, butalbital-acetaminophen-caffeine -40 mg, dextrose 50%, dextrose 50%, glucagon (human recombinant), glucose, glucose, labetalol, labetalol, nystatin-triamcinolone, ondansetron, promethazine, sodium chloride 0.9%    Laboratory/Diagnostic Data:  Reviewed and noted in plan where applicable- Please see chart for full lab data.      Recent Labs  Lab 01/22/18  0703 01/23/18  0351 01/24/18  0443   WBC 4.16 4.40 2.92*   HGB 8.8* 8.9* 8.7*   HCT 27.6* 27.7* 28.4*    170 164         Recent Labs  Lab 01/22/18  0703 01/23/18  0351 01/24/18  0443    137 136   K 4.3 3.9 4.2   * 109 109   CO2 17* 19* 19*   BUN 62* 61* 57*   CREATININE 2.5* 2.3* 2.5*   GLU 64* 79 71   CALCIUM 8.4* 8.5* 8.5*   MG 1.7 1.8 1.6   PHOS 4.5 4.7* 5.2*         Recent Labs  Lab 01/22/18  0703 01/23/18  0351 01/24/18  0443   ALKPHOS 73 76 77   ALT 13 14 16   AST 28 30 36   ALBUMIN 2.4* 2.5* 2.4*   PROT 6.1 6.3 6.1   BILITOT 0.1 0.1 0.2        Microbiology labs for the last week  Microbiology Results (last 7 days)     ** No results found for the last 168 hours. **           Imaging Results          US Renal Artery Stenosis Hyperten (xpd) (Final result)     Abnormal  Result time 01/18/18 19:13:43    Final result by Daksha Arevalo MD (01/18/18 19:13:43)                 Impression:         1. The origin of the right renal artery is not definitely visualized and there are questionable tardus parvus waveforms within the segmental branches of the right renal artery, which can be seen in setting of renal artery stenosis.  CTA abdomen pelvis is recommended to evaluate the renal artery ostium.    2. Sequela of medical renal disease.    3. Patient is status post left nephrectomy.    EPIC notification activated.      ______________________________________      Electronically signed by resident: HERSON KHOURY MD  Date:     01/18/18  Time:    18:49            As the supervising and teaching physician, I personally reviewed the images and resident's interpretation and I agree with the findings.      ______________________________________     Electronically signed by resident: HERSON KHOURY MD  Date:     01/18/18  Time:    19:06            As the supervising and teaching physician, I personally reviewed the images and resident's interpretation and I agree with the findings.          Electronically signed by: TWAN CLARK MD  Date:     01/18/18  Time:    19:13              Narrative:    Time of Procedure: 01/18/18 17:50:00  Accession # [id number].    HISTORY: Hypertension    TECHNIQUE: Doppler ultrasound of the kidneys was performed.    COMPARISON: CT abdomen and pelvis with contrast on 3/8/2017    FINDINGS:      RIGHT: The right kidney measures 11.8 cm and demonstrates mild increased cortical echogenicity with no evidence of cortical thinning.  No nephrolithiasis or solid renal masses.  Initial images demonstrate mild right-sided hydronephrosis that resolves after urinary bladder emptying.  Perfusion to the right kidney is mildly decreased.  Resistive indices are elevated as follow: Upper segmental artery 0.76, middle segmental artery 0.70, and lower segmental artery 0.80. The main renal artery is not definitely visualized at its origin.   The maximum velocity of the right renal artery measures 64 cm/s with a maximum renal artery to aortic ratio of 0.6. The segmental branches of the right renal artery demonstrate possible tardus parvus waveform.     LEFT: Patient status post left nephrectomy.                             X-Ray Chest 1 View (Final result)  Result time 01/15/18 09:25:06    Final result by Roseline Zhu MD (01/15/18 09:25:06)                 Impression:      Interval diuresis with resolution of pulmonary edema.  However a small amount of RIGHT  subpulmonic pleural fluid persists, creating a juxtaphrenic peak sign where it inserts into the caudal margin of the RIGHT inferior accessory fissure.    No new disease.      Electronically signed by: Roseline Zhu MD  Date:     01/15/18  Time:    09:25              Narrative:    Time of Procedure: 01/15/18 05:50:00  Accession # [id number]    Comparison: 11/27/2017.    Number of views: 1.    Findings:  Chest radiograph performed 11/27/2017 revealed extensive interstitial pulmonary edema plus dependent RIGHT pleural fluid and some consolidation in the lateral aspect the RIGHT lower lung zone.    There is been marked improvement in these abnormalities on today's study.  There is still a small amount of dependent RIGHT pleural fluid, well appreciated because it inserts into the inferior aspect of the RIGHT inferior accessory fissure creating a juxtaphrenic peak sign.  However there is no evidence of pulmonary edema and no LEFT pleural fluid.    There is no airspace disease, pneumothorax, pneumomediastinum, pneumoperitoneum or significant osseous abnormality.  Metallic hardware projects over the upper abdomen similar to the earlier study.                             MRI Brain Without Contrast (Final result)  Result time 01/14/18 13:10:30    Final result by Janeth Mcgarry MD (01/14/18 13:10:30)                 Impression:        No acute abnormality.  ______________________________________     Electronically signed by resident: THOR SARMIENTO MD  Date:     01/14/18  Time:    12:48            As the supervising and teaching physician, I personally reviewed the images and resident's interpretation and I agree with the findings.          Electronically signed by: JANETH MCGARRY MD  Date:     01/14/18  Time:    13:10              Narrative:    CLINICAL INDICATION: 72 year old F with severe vertigo in setting of hypertensive emergency     TECHNIQUE: Routine MRI brain without contrast. Multiplanar multisequence MR  imaging of the brain was performed without intravenous contrast.    COMPARISON: CT head 1/14/17    FINDINGS:    Intracranial compartment:    Ventricles and sulci are normal in size for age without evidence of hydrocephalus. No extra-axial blood or fluid collections.    The brain parenchyma demonstrate mild chronic microvascular ischemic change in the supratentorial white matter. No mass lesion, acute hemorrhage, edema or acute infarct.    Normal vascular flow voids are preserved.      Skull/extracranial contents (limited evaluation): Bone marrow signal intensity is normal.                             CT Head Without Contrast (Final result)  Result time 01/14/18 06:57:56    Final result by Gayle Aquino MD (01/14/18 06:57:56)                 Impression:      1.  No CT evidence of acute intracranial abnormality. Clinical correlation and further evaluation as warrented.     2.  Mild generalized cerebral volume loss and microangiopathic change, similar to prior examination.      Electronically signed by: GAYLE AQUINO  Date:     01/14/18  Time:    06:57              Narrative:    TECHNIQUE:  Multiple contiguous axial images of the brain were obtained from base to the vertex without the use of intravenous contrast. Sagittal and coronal reconstruction images were formatted in postprocessing.    COMPARISON:    Head CT 9/20/2017    FINDINGS:      There is no acute intracranial hemorrhage, hydrocephalus, midline shift or mass effect. There is mild generalized cerebral volume loss and mild bilateral deep cerebral white matter microangiopathic changes.  Gray-white matter differentiation otherwise is maintained. The basal cisterns are patent. The mastoid air cells and paranasal sinuses are clear of acute process. The visualized bones of the calvarium demonstrate no acute osseous abnormality.                              Estimated body mass index is 19.44 kg/m² as calculated from the following:    Height as of this encounter: 5'  "5" (1.651 m).    Weight as of this encounter: 53 kg (116 lb 13.5 oz).    I & O (Last 24H):    Intake/Output Summary (Last 24 hours) at 01/24/18 1246  Last data filed at 01/24/18 0559   Gross per 24 hour   Intake              240 ml   Output             1200 ml   Net             -960 ml       Estimated Creatinine Clearance: 17 mL/min (based on SCr of 2.5 mg/dL (H)).    ASSESSMENT/PLAN:     Active Problems:    Active Hospital Problems    Diagnosis  POA    *Hypertensive emergency [I16.1]Off Necardipine drip l likely rebound HTN from stopping clonidine.SBP 190s. does not want to take amlodipine due to history of pedal edema. BP - 1improved 150s.continue current  regimen of hydralazine 50mg TID, irbesartan 300mgqam , labetalol 400mg BID, torsemide BID.s/p cardiology evaluatin.  willing to try clonidine nightly again, renal artery ultrasound - 1/24- There is insignificant stenosis (0-59%) in the Right renal artery., Aldosterone 31.6,Aldosterone/renin ratio elevated at 205. Endocrine consulted   plasma Metanephrines,  pending.    Headache - occipital. started on IV tylenol x 1 dose  Yes    Aortic stenosis [I35.0]Moderate aortic stenosis, PABLITO = 0.93 cm2  Unknown    Cor pulmonale, chronic [I27.81]Pulmonary hypertension. The estimated PA systolic pressure is 68 mmHg.   Unknown    Chronic kidney disease, stage IV (severe) [N18.4]follow up with nephrology   Unknown    Vertigo [R42]denies vertigo, only had lightheadedness. MRI and CT brain -No acute abnormality.  Yes    Dizzy [R42]resolved as above  Yes    Anemia associated with chronic renal failure [D63.1]Hb 8.9 . Normocytic   Yes    CKD (chronic kidney disease) [N18.9]4- Solitary kidney. seems to be at baseline, follows up with nephrology.Followed by Dr. Ruffin in nephrology  Yes    Hyperlipidemia [E78.5]on rosuvastatin and cholestyramine  Yes    CAD (coronary artery disease) [I25.10]Asymptomatic. continue  low dose ASA.    Chronic diastolic heart failure - Well " compensated. Continue daily weights and torsemide 20mg BID.  PRN weight gain/leg swelling/SOB  Leucopenia/ Granulocytopenia -2.9/ 1.3. monitor    Yes     Chronic    Lupus (systemic lupus erythematosus) [M32.9].Continue HCQ daily  .UA with 2+ protein and positive for occult blood. She has a hx of singe kidney biopsy is high risk  Yes     Chronic     Early 1990's developed acute respiratory failure and spent weeks in ICU [facility]   Positive CARYN, SSB  Hx Rash, leukopenia,         Resolved Hospital Problems    Diagnosis Date Resolved POA   No resolved problems to display.         Disposition- Home    DVT prophylaxis addressed with: B/L SCD            Magdy Bella MD  Attending Staff Physician  [facility]  pager- [phone]  Spectralafg - [phone]

## 2018-01-24 NOTE — CONSULTS
[facility]  Cardiology  Consult Note    Patient Name: Ewelina Arnold  MRN: [id number]  Admission Date: 1/14/2018  Hospital Length of Stay: 10 days  Code Status: Full Code   Attending Provider: Bryn Torres MD  Consulting Provider: Bryn Torres MD  Primary Care Physician: Kalie Walton MD  Principal Problem:Hypertensive emergency    Patient information was obtained from patient and ER records.     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    She reports that she was seen by her PCP 8 days prior to presentation. At that time, clonidine was added for hypertension. She felt dizzy with this medication, so she stopped taking it.     Upon arrival to the ED, her BP was >230. She was started on cardene gtt and admitted to MICU. A renal US was performed but was unable to locate origin of right renal artery. Additionally, there were dampened waveforms in segmental branches of RRA, raising concern of renal artery stenosis. Since hospitalization, her BP regimen has been up-titrated to include hydralazine TID, irbesartan, labetalol BID, torsemide BID.     Her most recent LHC was 12/2017 with 50% proximal LCx stenosis but otherwise normal coronary arteries. Most recent echo 11/2017 with LVEF 55% and moderate aortic stenosis.    Past Medical History:   Diagnosis Date    Acute on chronic diastolic congestive heart failure 11/17/2017    Allergy     Anatomical narrow angle glaucoma     Anemia     States diagnosed about a month ago    Aortic atherosclerosis     Arthritis     Cataract     CHF (congestive heart failure)     Chronic kidney disease     Chronic sciatica of left side     Right lower back pain  due to sciatica    Coronary artery disease     Depression     Dry eyes     GERD (gastroesophageal reflux disease)     History of colon cancer     Hyperlipidemia     Hypertension     Hypothyroidism     Left ventricular diastolic dysfunction with preserved systolic function     Lupus (systemic lupus erythematosus) 8/17/2012    Malnutrition 5/16/2017    Osteoarthritis of cervical spine 8/17/2012    Osteopenia     PAD (peripheral artery disease)        Past Surgical History:   Procedure Laterality Date    CARDIAC CATHETERIZATION  07/27/2011    x1    CHOLECYSTECTOMY  1999    COLON SURGERY  2000 & 2003    partial removal    COLONOSCOPY N/A 4/14/2016    Procedure: COLONOSCOPY;  Surgeon: Jose Steen MD;  Location: [facility] (4TH FLR);  Service: Endoscopy;  Laterality: N/A;  prep 2 days prior light meals only/clear liquid day before  and 4 dulcolax tabs (5 mgs) at noon    COLONOSCOPY N/A 9/14/2017    Procedure: COLONOSCOPY;  Surgeon: Jose Steen MD;  Location: [facility] (4TH FLR);  Service: Endoscopy;  Laterality: N/A;    EYE SURGERY      HAND SURGERY Bilateral 1996 & 1997    due to carpal tunnel     HERNIA REPAIR      HYSTERECTOMY  1983    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    Procardia [nifedipine] Nausea Only and Edema     Feet swelling and nausea, now reports that this has happened in the past and required lasix    Arthrotec 50 [diclofenac-misoprostol]      Other reaction(s): Unknown     Bentyl [dicyclomine]      Not effective    Doxycycline      nausea    Imdur [isosorbide mononitrate] Nausea Only    Lomotil [diphenoxylate-atropine]      Stomach cramps, pt vomitted    Lozol [indapamide] Rash    Tramadol Nausea Only       No current facility-administered medications on file prior to encounter.      Current Outpatient Prescriptions on File Prior to Encounter   Medication Sig    butalbital-acetaminophen-caffeine -40 mg (FIORICET, ESGIC) -40 mg per tablet Take 1 tablet by mouth every 4 (four) hours as needed for Pain.    carvedilol (COREG) 12.5 MG tablet Take 1 tablet (12.5 mg total) by mouth 2 (two) times daily with meals.    citalopram (CELEXA) 20 MG tablet TAKE 1 TABLET (20 MG TOTAL) BY MOUTH ONCE DAILY.    hydroxychloroquine (PLAQUENIL) 200 mg tablet TAKE 1 TABLET TWICE DAILY    irbesartan (AVAPRO) 300 MG tablet Take 1 tablet (300 mg total) by mouth every evening.    levothyroxine (SYNTHROID) 75 MCG tablet Take 1 tablet (75 mcg total) by mouth before breakfast.    rosuvastatin (CRESTOR) 40 MG Tab TAKE 1 TABLET EVERY EVENING    acetaminophen (TYLENOL) 500 MG tablet Take 500 mg by mouth every 6 (six) hours as needed for Pain.    ammonium lactate 12 % Crea Apply 1 application topically 2 (two) times daily as needed.    aspirin (ECOTRIN) 81 MG EC tablet Take 1 tablet (81 mg total) by mouth once daily. (Patient taking differently: Take 81 mg by mouth. )    cholestyramine (QUESTRAN) 4 gram packet Take 1 packet (4 g total) by mouth 3 (three) times daily with meals.    conjugated estrogens (PREMARIN) vaginal cream Use 1g nightly for two weeks, then 1g twice per week.    fexofenadine (ALLEGRA) 180 MG tablet Take 1 tablet (180 mg total) by mouth once daily. (Patient taking differently: Take 180 mg by mouth as needed. )    hyoscyamine (LEVSIN/SL) 0.125 mg Subl Place 1 tablet (0.125 mg total) under the tongue 2 (two) times daily as needed.    ipratropium (ATROVENT) 0.03 % nasal  spray 2 sprays by Nasal route 2 (two) times daily as needed for Rhinitis.    multivitamin capsule Take 1 capsule by mouth once daily.    omeprazole (PRILOSEC) 40 MG capsule Take 1 capsule (40 mg total) by mouth once daily.    TENS units Sravanthi 1 application by Misc.(Non-Drug; Combo Route) route daily as needed (pain).    triamcinolone acetonide 0.1% (KENALOG) 0.1 % paste PLACE ONTO TEETH TWICE DAILY    VITAMIN D2 50,000 unit capsule TAKE 1 CAPSULE (50,000 UNITS TOTAL) EVERY 30 DAYS     Family History     Problem Relation (Age of Onset)    Diabetes Maternal Grandmother, Son, Maternal Aunt    Heart attack Mother    Heart disease Father    Hypertension Mother, Father, Sister, Brother    Kidney disease Brother    Kidney failure Brother    No Known Problems Daughter, Maternal Grandfather, Paternal Grandmother, Paternal Grandfather        Social History Main Topics    Smoking status: Former Smoker     Packs/day: 1.50     Years: 30.00     Types: Cigarettes     Start date: 1955     Quit date: 3/13/1985    Smokeless tobacco: Never Used    Alcohol use No    Drug use: No    Sexual activity: Not Currently     Partners: Male     Birth control/ protection: Abstinence     ROS  Objective:     Vital Signs (Most Recent):  Temp: 97.8 °F (36.6 °C) (01/24/18 0756)  Pulse: 64 (01/24/18 1026)  Resp: 18 (01/24/18 0756)  BP: (!) 153/64 (01/24/18 0756)  SpO2: 97 % (01/24/18 0756) Vital Signs (24h Range):  Temp:  [97.8 °F (36.6 °C)-98.5 °F (36.9 °C)] 97.8 °F (36.6 °C)  Pulse:  [64-74] 64  Resp:  [14-20] 18  SpO2:  [94 %-98 %] 97 %  BP: (114-228)/() 153/64     Weight: 53 kg (116 lb 13.5 oz)  Body mass index is 19.44 kg/m².    SpO2: 97 %  O2 Device (Oxygen Therapy): room air      Intake/Output Summary (Last 24 hours) at 01/24/18 1127  Last data filed at 01/24/18 0559   Gross per 24 hour   Intake              240 ml   Output             1200 ml   Net             -960 ml       Lines/Drains/Airways     Peripheral Intravenous Line                  Peripheral IV - Single Lumen 01/24/18 0200 Right Forearm less than 1 day                Physical Exam   Constitutional: She is oriented to person, place, and time. She appears well-developed and well-nourished. No distress.   HENT:   Head: Normocephalic and atraumatic.   Eyes: EOM are normal.   Neck: No JVD present.   Cardiovascular: Normal rate, regular rhythm and normal heart sounds.    Pulmonary/Chest: Breath sounds normal.   Abdominal: Soft. Bowel sounds are normal.   Musculoskeletal: Normal range of motion. She exhibits no edema.   Neurological: She is alert and oriented to person, place, and time.   Skin: Skin is warm and dry.   Psychiatric: She has a normal mood and affect. Her behavior is normal.       Significant Labs:   BMP:   Recent Labs  Lab 01/23/18  0351 01/24/18  0443   GLU 79 71    136   K 3.9 4.2    109   CO2 19* 19*   BUN 61* 57*   CREATININE 2.3* 2.5*   CALCIUM 8.5* 8.5*   MG 1.8 1.6   , CMP   Recent Labs  Lab 01/23/18  0351 01/24/18  0443    136   K 3.9 4.2    109   CO2 19* 19*   GLU 79 71   BUN 61* 57*   CREATININE 2.3* 2.5*   CALCIUM 8.5* 8.5*   PROT 6.3 6.1   ALBUMIN 2.5* 2.4*   BILITOT 0.1 0.2   ALKPHOS 76 77   AST 30 36   ALT 14 16   ANIONGAP 9 8   ESTGFRAFRICA 23.8* 21.5*   EGFRNONAA 20.6* 18.6*   , CBC   Recent Labs  Lab 01/23/18  0351 01/24/18  0443   WBC 4.40 2.92*   HGB 8.9* 8.7*   HCT 27.7* 28.4*    164   , INR No results for input(s): INR, PROTIME in the last 48 hours., Lipid Panel No results for input(s): CHOL, HDL, LDLCALC, TRIG, CHOLHDL in the last 48 hours. and Troponin No results for input(s): TROPONINI in the last 48 hours.      Assessment and Plan:     Hypertension    Ms Arnold is a 71yo F with HTN, CKD 3 (solitary kidney), CAD s/p PCI, moderate AS, lupus, colon cancer in remission, who presents with resistant hypertension. Renal ultrasound has raised concerns of renal artery stenosis. Aldosterone/renin ratio elevated at 205; however,  renal artery ultrasound shows only mild renal artery stenosis.    She continues to have elevated daytime pressures reaching peak of 230 systolic yesterday and low of 114 systolic this morning after clonidine.     - decrease hydralazine from 150mg TID back to home dose of 50mg BID  - change irbesartan from nightly to qAM  - continue labetalol 400mg BID, torsemide BID, clonidine 0.2mg nightly              VTE Risk Mitigation         Ordered     Medium Risk of VTE  Once      01/14/18 0814          Thank you for your consult. I will follow-up with patient. Please contact us if you have any additional questions.    Bryn Torres MD  Cardiology   [facility]

## 2018-01-24 NOTE — PLAN OF CARE
Problem: Patient Care Overview  Goal: Plan of Care Review  Outcome: Ongoing (interventions implemented as appropriate)    Recommendations     1. Encourage <75% PO intake.   2. Boost Breeze ONS ordered for adequate nutrition.   3. RD to monitor and follow-up    Assessment and Plan         Anemia associated with chronic renal failure     Problem:  Increased energy needs     Related to (etiology):   Dx of CKD      Signs and Symptoms (as evidenced by):   GFR 18.6 and creatinine 2.5       Interventions/Recommendations (treatment strategy):  See recs above     Nutrition Diagnosis Status:   New

## 2018-01-24 NOTE — HOSPITAL COURSE
No acute events overnight. She received clonidine 0.2mg x1 with improvement in blood pressure to 114/60. She denies symptoms from clonidine use. She had a headache with elevated BP ~230 mmHg prior to use of clonidine. She is awaiting renal artery US today.

## 2018-01-25 ENCOUNTER — TELEPHONE (OUTPATIENT)
Dept: ENDOCRINOLOGY | Facility: CLINIC | Age: 73
End: 2018-01-25

## 2018-01-25 DIAGNOSIS — I15.2 HYPERTENSION DUE TO ENDOCRINE DISORDER: Primary | ICD-10-CM

## 2018-01-25 PROBLEM — E26.9 HIGH ALDOSTERONE: Status: ACTIVE | Noted: 2018-01-25

## 2018-01-25 PROBLEM — R73.02 IMPAIRED GLUCOSE TOLERANCE: Status: ACTIVE | Noted: 2018-01-25

## 2018-01-25 LAB
ALBUMIN SERPL BCP-MCNC: 2.4 G/DL
ALP SERPL-CCNC: 81 U/L
ALT SERPL W/O P-5'-P-CCNC: 22 U/L
ANION GAP SERPL CALC-SCNC: 7 MMOL/L
AST SERPL-CCNC: 44 U/L
BASOPHILS # BLD AUTO: 0.02 K/UL
BASOPHILS NFR BLD: 0.6 %
BILIRUB SERPL-MCNC: 0.2 MG/DL
BUN SERPL-MCNC: 60 MG/DL
C3 SERPL-MCNC: 101 MG/DL
C4 SERPL-MCNC: 22 MG/DL
CALCIUM SERPL-MCNC: 8.3 MG/DL
CHLORIDE SERPL-SCNC: 107 MMOL/L
CO2 SERPL-SCNC: 21 MMOL/L
CREAT SERPL-MCNC: 2.6 MG/DL
DIFFERENTIAL METHOD: ABNORMAL
DSDNA AB SER-ACNC: NORMAL [IU]/ML
EOSINOPHIL # BLD AUTO: 0.2 K/UL
EOSINOPHIL NFR BLD: 6 %
ERYTHROCYTE [DISTWIDTH] IN BLOOD BY AUTOMATED COUNT: 13.8 %
EST. GFR  (AFRICAN AMERICAN): 20.5 ML/MIN/1.73 M^2
EST. GFR  (NON AFRICAN AMERICAN): 17.8 ML/MIN/1.73 M^2
GLUCOSE SERPL-MCNC: 79 MG/DL
HCT VFR BLD AUTO: 27.5 %
HGB BLD-MCNC: 8.8 G/DL
IMM GRANULOCYTES # BLD AUTO: 0.01 K/UL
IMM GRANULOCYTES NFR BLD AUTO: 0.3 %
LYMPHOCYTES # BLD AUTO: 1.1 K/UL
LYMPHOCYTES NFR BLD: 32.4 %
MAGNESIUM SERPL-MCNC: 1.5 MG/DL
MCH RBC QN AUTO: 25.8 PG
MCHC RBC AUTO-ENTMCNC: 32 G/DL
MCV RBC AUTO: 81 FL
MONOCYTES # BLD AUTO: 0.6 K/UL
MONOCYTES NFR BLD: 18.2 %
NEUTROPHILS # BLD AUTO: 1.4 K/UL
NEUTROPHILS NFR BLD: 42.5 %
NRBC BLD-RTO: 0 /100 WBC
PHOSPHATE SERPL-MCNC: 4.7 MG/DL
PLATELET # BLD AUTO: 170 K/UL
PMV BLD AUTO: 11.6 FL
POTASSIUM SERPL-SCNC: 4.2 MMOL/L
PROT SERPL-MCNC: 6.2 G/DL
RBC # BLD AUTO: 3.41 M/UL
SODIUM SERPL-SCNC: 135 MMOL/L
WBC # BLD AUTO: 3.36 K/UL

## 2018-01-25 PROCEDURE — 99222 1ST HOSP IP/OBS MODERATE 55: CPT | Mod: ,,, | Performed by: INTERNAL MEDICINE

## 2018-01-25 PROCEDURE — 63600175 PHARM REV CODE 636 W HCPCS: Performed by: HOSPITALIST

## 2018-01-25 PROCEDURE — 99232 SBSQ HOSP IP/OBS MODERATE 35: CPT | Mod: ,,, | Performed by: INTERNAL MEDICINE

## 2018-01-25 PROCEDURE — 25000003 PHARM REV CODE 250: Performed by: INTERNAL MEDICINE

## 2018-01-25 PROCEDURE — 99233 SBSQ HOSP IP/OBS HIGH 50: CPT | Mod: ,,, | Performed by: HOSPITALIST

## 2018-01-25 PROCEDURE — 25000003 PHARM REV CODE 250: Performed by: STUDENT IN AN ORGANIZED HEALTH CARE EDUCATION/TRAINING PROGRAM

## 2018-01-25 PROCEDURE — 80053 COMPREHEN METABOLIC PANEL: CPT

## 2018-01-25 PROCEDURE — 84100 ASSAY OF PHOSPHORUS: CPT

## 2018-01-25 PROCEDURE — 25000003 PHARM REV CODE 250: Performed by: HOSPITALIST

## 2018-01-25 PROCEDURE — 83735 ASSAY OF MAGNESIUM: CPT

## 2018-01-25 PROCEDURE — 85025 COMPLETE CBC W/AUTO DIFF WBC: CPT

## 2018-01-25 PROCEDURE — 99232 SBSQ HOSP IP/OBS MODERATE 35: CPT | Mod: GC,,, | Performed by: INTERNAL MEDICINE

## 2018-01-25 PROCEDURE — 36415 COLL VENOUS BLD VENIPUNCTURE: CPT

## 2018-01-25 PROCEDURE — 11000001 HC ACUTE MED/SURG PRIVATE ROOM

## 2018-01-25 RX ORDER — HYDRALAZINE HYDROCHLORIDE 50 MG/1
50 TABLET, FILM COATED ORAL EVERY 8 HOURS
Qty: 90 TABLET | Refills: 11 | Status: SHIPPED | OUTPATIENT
Start: 2018-01-25 | End: 2018-02-23 | Stop reason: SDUPTHER

## 2018-01-25 RX ORDER — TORSEMIDE 20 MG/1
20 TABLET ORAL 2 TIMES DAILY
Qty: 60 TABLET | Refills: 11 | Status: SHIPPED | OUTPATIENT
Start: 2018-01-25 | End: 2018-01-29 | Stop reason: SDUPTHER

## 2018-01-25 RX ORDER — MAGNESIUM SULFATE HEPTAHYDRATE 40 MG/ML
2 INJECTION, SOLUTION INTRAVENOUS ONCE
Status: COMPLETED | OUTPATIENT
Start: 2018-01-25 | End: 2018-01-25

## 2018-01-25 RX ORDER — CLONIDINE HYDROCHLORIDE 0.2 MG/1
0.2 TABLET ORAL NIGHTLY
Qty: 30 TABLET | Refills: 11 | Status: SHIPPED | OUTPATIENT
Start: 2018-01-25 | End: 2018-01-29

## 2018-01-25 RX ORDER — IRBESARTAN 300 MG/1
300 TABLET ORAL DAILY
Qty: 90 TABLET | Refills: 3 | Status: ON HOLD | OUTPATIENT
Start: 2018-01-26 | End: 2018-03-28 | Stop reason: HOSPADM

## 2018-01-25 RX ORDER — LABETALOL 200 MG/1
400 TABLET, FILM COATED ORAL EVERY 12 HOURS
Qty: 120 TABLET | Refills: 11 | Status: SHIPPED | OUTPATIENT
Start: 2018-01-25 | End: 2018-03-07 | Stop reason: SDUPTHER

## 2018-01-25 RX ADMIN — PANTOPRAZOLE SODIUM 40 MG: 40 TABLET, DELAYED RELEASE ORAL at 09:01

## 2018-01-25 RX ADMIN — ASPIRIN 81 MG: 81 TABLET, COATED ORAL at 09:01

## 2018-01-25 RX ADMIN — TORSEMIDE 20 MG: 20 TABLET ORAL at 09:01

## 2018-01-25 RX ADMIN — LATANOPROST 1 DROP: 50 SOLUTION OPHTHALMIC at 09:01

## 2018-01-25 RX ADMIN — LABETALOL HYDROCHLORIDE 10 MG: 5 INJECTION INTRAVENOUS at 11:01

## 2018-01-25 RX ADMIN — ROSUVASTATIN CALCIUM 40 MG: 20 TABLET, FILM COATED ORAL at 09:01

## 2018-01-25 RX ADMIN — SPIRONOLACTONE 12.5 MG: 25 TABLET, FILM COATED ORAL at 09:01

## 2018-01-25 RX ADMIN — HYDROXYCHLOROQUINE SULFATE 200 MG: 200 TABLET, FILM COATED ORAL at 09:01

## 2018-01-25 RX ADMIN — LABETALOL HCL 400 MG: 200 TABLET, FILM COATED ORAL at 09:01

## 2018-01-25 RX ADMIN — HYDRALAZINE HYDROCHLORIDE 50 MG: 50 TABLET ORAL at 09:01

## 2018-01-25 RX ADMIN — LEVOTHYROXINE SODIUM 75 MCG: 75 TABLET ORAL at 05:01

## 2018-01-25 RX ADMIN — HYDRALAZINE HYDROCHLORIDE 50 MG: 50 TABLET ORAL at 05:01

## 2018-01-25 RX ADMIN — IRBESARTAN 300 MG: 300 TABLET ORAL at 09:01

## 2018-01-25 RX ADMIN — MAGNESIUM SULFATE IN WATER 2 G: 40 INJECTION, SOLUTION INTRAVENOUS at 02:01

## 2018-01-25 RX ADMIN — CLONIDINE HYDROCHLORIDE 0.2 MG: 0.2 TABLET ORAL at 09:01

## 2018-01-25 RX ADMIN — CHOLESTYRAMINE 4 G: 4 POWDER, FOR SUSPENSION ORAL at 12:01

## 2018-01-25 RX ADMIN — CITALOPRAM HYDROBROMIDE 20 MG: 20 TABLET ORAL at 09:01

## 2018-01-25 RX ADMIN — HYDRALAZINE HYDROCHLORIDE 50 MG: 50 TABLET ORAL at 02:01

## 2018-01-25 NOTE — PROGRESS NOTES
Notified by PCT that pt bp was 181/81 at 442am.after waking pt up. Arrived to room and reassessed pt and received several different Blood pressure readings. See flow chart.

## 2018-01-25 NOTE — HPI
Reason for Consult: Management of secondary hypertension      HPI:   Patient is a 72 y.o. female with a diagnosis of htn, CKD prior nephrectomy, CAD s/p PCI, SLE, hypothyroidism, IGT admitted to critical care medicine on 1/14 for hypertensive emergency. Initially put on a cardene gtt for bp control and now on oral agents with continue significant hypertension. Secondary work-up while hospitalized notable for elevated aldosterone and suppressed renin. Some previous hypokalemia on lab review but potassium now recently normal while on potassium sparing diurectic. Nephrology consulted this admission and evaluating for FRAN.

## 2018-01-25 NOTE — SUBJECTIVE & OBJECTIVE
Interval History: Patient reports improvement of her lightheadedness. Otherwise, offers no complaints. SBPs ranged from 140s - 180s overnight.    Review of Systems   Constitution: Negative for chills and fever.   HENT: Negative for congestion.    Eyes: Negative for blurred vision.   Cardiovascular: Negative for chest pain.   Respiratory: Negative for cough.    Hematologic/Lymphatic: Negative for bleeding problem.   Skin: Negative for poor wound healing and rash.   Musculoskeletal: Negative for falls.   Gastrointestinal: Negative for abdominal pain.   Genitourinary: Negative for dysuria.   Neurological: Negative for focal weakness.   Psychiatric/Behavioral: Negative for altered mental status.     Objective:     Vital Signs (Most Recent):  Temp: 97.7 °F (36.5 °C) (01/25/18 0832)  Pulse: 63 (01/25/18 1101)  Resp: 19 (01/24/18 2008)  BP: (!) 162/74 (01/25/18 0832)  SpO2: 99 % (01/25/18 0832) Vital Signs (24h Range):  Temp:  [97.7 °F (36.5 °C)-98.5 °F (36.9 °C)] 97.7 °F (36.5 °C)  Pulse:  [59-71] 63  Resp:  [19-20] 19  SpO2:  [96 %-99 %] 99 %  BP: (114-183)/(57-74) 162/74     Weight: 53 kg (116 lb 13.5 oz)  Body mass index is 19.44 kg/m².     SpO2: 99 %  O2 Device (Oxygen Therapy): room air      Intake/Output Summary (Last 24 hours) at 01/25/18 1147  Last data filed at 01/24/18 2300   Gross per 24 hour   Intake              100 ml   Output              600 ml   Net             -500 ml       Lines/Drains/Airways     Peripheral Intravenous Line                 Peripheral IV - Single Lumen 01/24/18 0200 Right Forearm 1 day                Physical Exam   Constitutional: She is oriented to person, place, and time. She appears well-developed and well-nourished. No distress.   HENT:   Head: Normocephalic and atraumatic.   Eyes: EOM are normal.   Neck: No JVD present.   Cardiovascular: Normal rate, regular rhythm and normal heart sounds.    Pulmonary/Chest: Breath sounds normal.   Abdominal: Soft. Bowel sounds are normal.    Musculoskeletal: Normal range of motion. She exhibits no edema.   Neurological: She is alert and oriented to person, place, and time.   Skin: Skin is warm and dry.   Psychiatric: She has a normal mood and affect. Her behavior is normal.       Significant Labs:   CMP   Recent Labs  Lab 01/24/18  0443 01/25/18  0659    135*   K 4.2 4.2    107   CO2 19* 21*   GLU 71 79   BUN 57* 60*   CREATININE 2.5* 2.6*   CALCIUM 8.5* 8.3*   PROT 6.1 6.2   ALBUMIN 2.4* 2.4*   BILITOT 0.2 0.2   ALKPHOS 77 81   AST 36 44*   ALT 16 22   ANIONGAP 8 7*   ESTGFRAFRICA 21.5* 20.5*   EGFRNONAA 18.6* 17.8*    and CBC   Recent Labs  Lab 01/24/18 0443 01/25/18  0659   WBC 2.92* 3.36*   HGB 8.7* 8.8*   HCT 28.4* 27.5*    170

## 2018-01-25 NOTE — PLAN OF CARE
Problem: Patient Care Overview  Goal: Plan of Care Review  Outcome: Ongoing (interventions implemented as appropriate)  Pt free from falls. Pt wears non slip socks when ambulating. Pt bed low and locked position. Pt afebrile. Pt IV site without redness or edema.  Pt has denied any pain or discomfort this shift. BP med sgiven and has been effective VSS are stable at present.

## 2018-01-25 NOTE — ASSESSMENT & PLAN NOTE
MARYAM on CKD 4 suspect iATN from quick correction of her BP on Nicardepene.     · sCr increased over the weekend but BP was high over the weekend, sCr stable 2.5 today  · UPCr elevated 1.22 up from what had been ~ 0.6   · Reanal US/Doppler suspicious for FRAN, will be repeated today in vascular lab  · Electrolytes not compatible with FRAN   · Aldosterone 31.6, Renin level 0.1and ratio 205 suggesting primary aldosterone state  · plasma Metanephrines.in process r/o secondary causes but doubt there is one  · For thoroughness we are r/o secondary couses, plan for vascular lab to evaluate renal artery today  · continue to monitor strict I/O's, daily weights, renally dose medications, and avoid nephrotoxic agents

## 2018-01-25 NOTE — PROGRESS NOTES
[facility]  Cardiology  Progress Note    Patient Name: Ewelina Arnold  MRN: [id number]  Admission Date: 1/14/2018  Hospital Length of Stay: 11 days  Code Status: Full Code   Attending Physician: Magdy Bella MD   Primary Care Physician: Kalie Walton MD  Expected Discharge Date: 1/26/2018  Principal Problem:Hypertensive emergency    Subjective:     Interval History: Patient reports improvement of her lightheadedness. Otherwise, offers no complaints. SBPs ranged from 140s - 180s overnight.    Review of Systems   Constitution: Negative for chills and fever.   HENT: Negative for congestion.    Eyes: Negative for blurred vision.   Cardiovascular: Negative for chest pain.   Respiratory: Negative for cough.    Hematologic/Lymphatic: Negative for bleeding problem.   Skin: Negative for poor wound healing and rash.   Musculoskeletal: Negative for falls.   Gastrointestinal: Negative for abdominal pain.   Genitourinary: Negative for dysuria.   Neurological: Negative for focal weakness.   Psychiatric/Behavioral: Negative for altered mental status.     Objective:     Vital Signs (Most Recent):  Temp: 97.7 °F (36.5 °C) (01/25/18 0832)  Pulse: 63 (01/25/18 1101)  Resp: 19 (01/24/18 2008)  BP: (!) 162/74 (01/25/18 0832)  SpO2: 99 % (01/25/18 0832) Vital Signs (24h Range):  Temp:  [97.7 °F (36.5 °C)-98.5 °F (36.9 °C)] 97.7 °F (36.5 °C)  Pulse:  [59-71] 63  Resp:  [19-20] 19  SpO2:  [96 %-99 %] 99 %  BP: (114-183)/(57-74) 162/74     Weight: 53 kg (116 lb 13.5 oz)  Body mass index is 19.44 kg/m².     SpO2: 99 %  O2 Device (Oxygen Therapy): room air      Intake/Output Summary (Last 24 hours) at 01/25/18 1147  Last data filed at 01/24/18 2300   Gross per 24 hour   Intake              100 ml   Output              600 ml   Net             -500 ml       Lines/Drains/Airways     Peripheral Intravenous Line                 Peripheral IV - Single Lumen 01/24/18 0200 Right Forearm 1 day                 Physical Exam   Constitutional: She is oriented to person, place, and time. She appears well-developed and well-nourished. No distress.   HENT:   Head: Normocephalic and atraumatic.   Eyes: EOM are normal.   Neck: No JVD present.   Cardiovascular: Normal rate, regular rhythm and normal heart sounds.    Pulmonary/Chest: Breath sounds normal.   Abdominal: Soft. Bowel sounds are normal.   Musculoskeletal: Normal range of motion. She exhibits no edema.   Neurological: She is alert and oriented to person, place, and time.   Skin: Skin is warm and dry.   Psychiatric: She has a normal mood and affect. Her behavior is normal.       Significant Labs:   CMP   Recent Labs  Lab 01/24/18  0443 01/25/18  0659    135*   K 4.2 4.2    107   CO2 19* 21*   GLU 71 79   BUN 57* 60*   CREATININE 2.5* 2.6*   CALCIUM 8.5* 8.3*   PROT 6.1 6.2   ALBUMIN 2.4* 2.4*   BILITOT 0.2 0.2   ALKPHOS 77 81   AST 36 44*   ALT 16 22   ANIONGAP 8 7*   ESTGFRAFRICA 21.5* 20.5*   EGFRNONAA 18.6* 17.8*    and CBC   Recent Labs  Lab 01/24/18  0443 01/25/18  0659   WBC 2.92* 3.36*   HGB 8.7* 8.8*   HCT 28.4* 27.5*    170     Assessment and Plan:     Other secondary hypertension    Ms Arnold is a 73yo F with HTN, CKD 3 (solitary kidney), CAD s/p PCI, moderate AS, lupus, colon cancer in remission, who presents with resistant hypertension. Renal ultrasound has raised concerns of renal artery stenosis. Aldosterone/renin ratio elevated at 205; however, renal artery ultrasound shows only mild renal artery stenosis.    Her BPs improved overnight, over last 24 hours, SBPs ranged from 140s - 180s.    - Continue hydralazine   50mg BID  - Continue irbesartan 300 mg QAM  - continue labetalol 400mg BID, torsemide BID, clonidine 0.2mg nightly    Cardiology to sign off. She can follow-up with her outpatient cardiologist for further anti-HTN medication titration              VTE Risk Mitigation         Ordered     Medium Risk of VTE  Once       01/14/18 0814          Familia Zuluaga MD  Cardiology  [facility]

## 2018-01-25 NOTE — SUBJECTIVE & OBJECTIVE
Interval History:   NAEON, BP better overnight,    UO not accurate 600 ml plus 2x unmeasured. sCr stable 2.6 this am. Comfortable on RA.    Review of patient's allergies indicates:   Allergen Reactions    Ace inhibitors      Other reaction(s): Lips Swelling    Vioxx [rofecoxib]      Other reaction(s): lips swelling    Bactrim [sulfamethoxazole-trimethoprim]      Pt would like this medication to be added due to elevation of creatinine with single kidney.    Procardia [nifedipine] Nausea Only and Edema     Feet swelling and nausea, now reports that this has happened in the past and required lasix    Arthrotec 50 [diclofenac-misoprostol]      Other reaction(s): Unknown    Bentyl [dicyclomine]      Not effective    Doxycycline      nausea    Imdur [isosorbide mononitrate] Nausea Only    Lomotil [diphenoxylate-atropine]      Stomach cramps, pt vomitted    Lozol [indapamide] Rash    Tramadol Nausea Only     Current Facility-Administered Medications   Medication Frequency    acetaminophen tablet 650 mg Q6H PRN    aspirin EC tablet 81 mg Daily    butalbital-acetaminophen-caffeine -40 mg per tablet 1 tablet Q4H PRN    cholestyramine 4 gram packet 4 g TID WM    citalopram tablet 20 mg Daily    cloNIDine tablet 0.2 mg QHS    dextrose 50% injection 12.5 g PRN    dextrose 50% injection 25 g PRN    glucagon (human recombinant) injection 1 mg PRN    glucose chewable tablet 16 g PRN    glucose chewable tablet 24 g PRN    hydrALAZINE tablet 50 mg Q8H    hydroxychloroquine tablet 200 mg BID    irbesartan tablet 300 mg Daily    labetalol injection 10 mg Q6H PRN    labetalol injection 10 mg Once    labetalol injection 10 mg Q4H PRN    labetalol tablet 400 mg Q12H    latanoprost 0.005 % ophthalmic solution 1 drop Daily    levothyroxine tablet 75 mcg Before breakfast    nystatin-triamcinolone cream QID PRN    ondansetron disintegrating tablet 8 mg Q8H PRN    pantoprazole EC tablet 40 mg Daily     promethazine tablet 25 mg Q6H PRN    rosuvastatin tablet 40 mg QHS    sodium chloride 0.9% flush 3 mL PRN    spironolactone split tablet 12.5 mg BID    torsemide tablet 20 mg BID       Objective:     Vital Signs (Most Recent):  Temp: 99 °F (37.2 °C) (01/25/18 1557)  Pulse: 61 (01/25/18 1557)  Resp: 18 (01/25/18 1557)  BP: (!) 174/84 (01/25/18 1557)  SpO2: 99 % (01/25/18 1557)  O2 Device (Oxygen Therapy): room air (01/25/18 1557) Vital Signs (24h Range):  Temp:  [97.7 °F (36.5 °C)-99 °F (37.2 °C)] 99 °F (37.2 °C)  Pulse:  [59-71] 61  Resp:  [18-20] 18  SpO2:  [98 %-100 %] 99 %  BP: (114-186)/(57-84) 174/84     Weight: 53 kg (116 lb 13.5 oz) (01/24/18 1254)  Body mass index is 19.44 kg/m².  Body surface area is 1.56 meters squared.    I/O last 3 completed shifts:  In: 100 [IV Piggyback:100]  Out: 1200 [Urine:1200]    Physical Exam   Constitutional: She is oriented to person, place, and time. She appears well-developed. No distress.   HENT:   Head: Normocephalic and atraumatic.   Eyes: Conjunctivae are normal. Pupils are equal, round, and reactive to light.   Neck: Trachea normal. Neck supple. No JVD present.   Cardiovascular: Normal rate, regular rhythm, S1 normal, S2 normal, intact distal pulses and normal pulses.  Exam reveals no gallop and no friction rub.    No murmur heard.  Pulmonary/Chest: Effort normal and breath sounds normal.   Abdominal: Soft. Bowel sounds are normal. She exhibits no distension. There is no tenderness.   Musculoskeletal: Normal range of motion.   Neurological: She is alert and oriented to person, place, and time.   Skin: Skin is warm and dry. Capillary refill takes less than 2 seconds.   Psychiatric: She has a normal mood and affect. Her behavior is normal.   Vitals reviewed.      Significant Labs:  BMP:     Recent Labs  Lab 01/25/18  0659   GLU 79      CO2 21*   BUN 60*   CREATININE 2.6*   CALCIUM 8.3*   MG 1.5*     Cardiac Markers: No results for input(s): CKMB, TROPONINT,  MYOGLOBIN in the last 168 hours.  CBC:     Recent Labs  Lab 01/25/18  0659   WBC 3.36*   RBC 3.41*   HGB 8.8*   HCT 27.5*      MCV 81*   MCH 25.8*   MCHC 32.0     CMP:     Recent Labs  Lab 01/25/18  0659   GLU 79   CALCIUM 8.3*   ALBUMIN 2.4*   PROT 6.2   *   K 4.2   CO2 21*      BUN 60*   CREATININE 2.6*   ALKPHOS 81   ALT 22   AST 44*   BILITOT 0.2     All labs within the past 24 hours have been reviewed.     Significant Imaging:  Labs: Reviewed

## 2018-01-25 NOTE — ASSESSMENT & PLAN NOTE
MARYAM on CKD 4 suspect iATN from quick correction of her BP on Nicardepene.     · sCr stable 2.6 today  · UPCr elevated 1.22 up from what had been ~ 0.6 but Complements stable awaiting DS-DNA AB  · Reanal US/Doppler per vascular lab show mild FRAN per cardiology  · Aldosterone 31.6, Renin level 0.1and ratio 205 suggesting primary aldosterone state  · plasma Metanephrines.in process r/o secondary causes but doubt there is one  · continue to monitor strict I/O's, daily weights, renally dose medications, and avoid nephrotoxic agents

## 2018-01-25 NOTE — ASSESSMENT & PLAN NOTE
Ms Arnold is a 71yo F with HTN, CKD 3 (solitary kidney), CAD s/p PCI, moderate AS, lupus, colon cancer in remission, who presents with resistant hypertension. Renal ultrasound has raised concerns of renal artery stenosis. Aldosterone/renin ratio elevated at 205; however, renal artery ultrasound shows only mild renal artery stenosis.    Her BPs improved overnight, over last 24 hours, SBPs ranged from 140s - 180s.    - Continue hydralazine   50mg BID  - Continue irbesartan 300 mg QAM  - continue labetalol 400mg BID, torsemide BID, clonidine 0.2mg nightly    Cardiology to sign off. She can follow-up with her outpatient cardiologist for further anti-HTN medication titration

## 2018-01-25 NOTE — CONSULTS
[facility]  Endocrinology  Consult Note    Consult Requested by: Magdy Bella MD   Reason for admit: Hypertensive emergency    HISTORY OF PRESENT ILLNESS:  Reason for Consult: Management of secondary hypertension      HPI:   Patient is a 72 y.o. female with a diagnosis of htn, CKD prior nephrectomy, CAD s/p PCI, SLE, hypothyroidism, IGT admitted to critical care medicine on 1/14 for hypertensive emergency. Initially put on a cardene gtt for bp control and now on oral agents with continue significant hypertension. Secondary work-up while hospitalized notable for elevated aldosterone and suppressed renin. Some previous hypokalemia on lab review but potassium now recently normal while on potassium sparing diurectic. Nephrology consulted this admission and evaluating for FRAN.      Medications and/or Treatments Impacting Glycemic Control:  Immunotherapy:    Immunosuppressants     None        Steroids:   Hormones     None        Pressors:    Autonomic Drugs     None          Prescriptions Prior to Admission   Medication Sig Dispense Refill Last Dose    butalbital-acetaminophen-caffeine -40 mg (FIORICET, ESGIC) -40 mg per tablet Take 1 tablet by mouth every 4 (four) hours as needed for Pain. 90 tablet 0 1/13/2018    carvedilol (COREG) 12.5 MG tablet Take 1 tablet (12.5 mg total) by mouth 2 (two) times daily with meals. 60 tablet 11 1/13/2018    citalopram (CELEXA) 20 MG tablet TAKE 1 TABLET (20 MG TOTAL) BY MOUTH ONCE DAILY. 90 tablet 1 1/13/2018    hydroxychloroquine (PLAQUENIL) 200 mg tablet TAKE 1 TABLET TWICE DAILY 180 tablet 1 1/13/2018    irbesartan (AVAPRO) 300 MG tablet Take 1 tablet (300 mg total) by mouth every evening. 90 tablet 3 1/13/2018    levothyroxine (SYNTHROID) 75 MCG tablet Take 1 tablet (75 mcg total) by mouth before breakfast. 90 tablet 0 1/13/2018 at 0900    rosuvastatin (CRESTOR) 40 MG Tab TAKE 1 TABLET EVERY EVENING 90 tablet 3 1/13/2018     [DISCONTINUED] cloNIDine (CATAPRES) 0.1 MG tablet Take 1 tablet (0.1 mg total) by mouth 2 (two) times daily. 60 tablet 1 1/13/2018    [DISCONTINUED] hydrALAZINE (APRESOLINE) 50 MG tablet Take 1 tablet (50 mg total) by mouth every 12 (twelve) hours. 180 tablet 3 1/13/2018    [DISCONTINUED] torsemide (DEMADEX) 20 MG Tab Take 1 tablet (20 mg total) by mouth 2 (two) times daily. (Patient taking differently: Take 20 mg by mouth once daily. ) 180 tablet 3 1/13/2018    acetaminophen (TYLENOL) 500 MG tablet Take 500 mg by mouth every 6 (six) hours as needed for Pain.   1/11/2018    ammonium lactate 12 % Crea Apply 1 application topically 2 (two) times daily as needed. 385 g 2 More than a month at Unknown time    aspirin (ECOTRIN) 81 MG EC tablet Take 1 tablet (81 mg total) by mouth once daily. (Patient taking differently: Take 81 mg by mouth. )  0 1/12/2018    cholestyramine (QUESTRAN) 4 gram packet Take 1 packet (4 g total) by mouth 3 (three) times daily with meals. 90 packet 4 1/12/2018    conjugated estrogens (PREMARIN) vaginal cream Use 1g nightly for two weeks, then 1g twice per week. 30 g 12 More than a month at Unknown time    fexofenadine (ALLEGRA) 180 MG tablet Take 1 tablet (180 mg total) by mouth once daily. (Patient taking differently: Take 180 mg by mouth as needed. ) 30 tablet 2 1/13/2018    hyoscyamine (LEVSIN/SL) 0.125 mg Subl Place 1 tablet (0.125 mg total) under the tongue 2 (two) times daily as needed. 60 tablet 1 More than a month at Unknown time    ipratropium (ATROVENT) 0.03 % nasal spray 2 sprays by Nasal route 2 (two) times daily as needed for Rhinitis. 30 mL 0 More than a month at Unknown time    multivitamin capsule Take 1 capsule by mouth once daily.   More than a month at Unknown time    omeprazole (PRILOSEC) 40 MG capsule Take 1 capsule (40 mg total) by mouth once daily. 90 capsule 3 More than a month at Unknown time    TENS units Sravanthi 1 application by Misc.(Non-Drug; Combo Route)  route daily as needed (pain). 1 Device 0 More than a month at Unknown time    triamcinolone acetonide 0.1% (KENALOG) 0.1 % paste PLACE ONTO TEETH TWICE DAILY 5 g 0 More than a month at Unknown time    VITAMIN D2 50,000 unit capsule TAKE 1 CAPSULE (50,000 UNITS TOTAL) EVERY 30 DAYS 3 capsule 0 1/1/2018    [DISCONTINUED] betamethasone dipropionate (DIPROLENE) 0.05 % cream Apply topically 2 (two) times daily as needed. 45 g 2 Taking    [DISCONTINUED] ENSURE ACTIVE CLEAR Liqd Take 240 mLs by mouth 2 (two) times daily. 60 Bottle 12 Taking    [DISCONTINUED] nystatin (MYCOSTATIN) ointment Apply topically 2 (two) times daily. 30 g 2 More than a month at Unknown time    [DISCONTINUED] nystatin-triamcinolone (MYCOLOG II) cream Apply topically 4 (four) times daily. 60 g 0 Taking       Current Facility-Administered Medications   Medication Dose Route Frequency Provider Last Rate Last Dose    acetaminophen tablet 650 mg  650 mg Oral Q6H PRN Emeka Martínez MD   650 mg at 01/24/18 0556    aspirin EC tablet 81 mg  81 mg Oral Daily Magdy Bella MD   81 mg at 01/25/18 0924    butalbital-acetaminophen-caffeine -40 mg per tablet 1 tablet  1 tablet Oral Q4H PRN Emeka Martínez MD   1 tablet at 01/22/18 2052    cholestyramine 4 gram packet 4 g  1 packet Oral TID  Magdy Bella MD   4 g at 01/24/18 1822    citalopram tablet 20 mg  20 mg Oral Daily Fernanda Ann MD   20 mg at 01/25/18 0924    cloNIDine tablet 0.2 mg  0.2 mg Oral QHS Bryn Torres MD   0.2 mg at 01/24/18 2224    dextrose 50% injection 12.5 g  12.5 g Intravenous PRN Fernanda Ann MD        dextrose 50% injection 25 g  25 g Intravenous PRN Fernanda Ann MD        glucagon (human recombinant) injection 1 mg  1 mg Intramuscular PRN Fernanda Ann MD        glucose chewable tablet 16 g  16 g Oral PRN Fernanda Ann MD        glucose chewable tablet 24 g  24 g Oral PRN Fernanda Ann MD        hydrALAZINE tablet 50 mg   50 mg Oral Q8H Magdy Bella MD   50 mg at 18 0525    hydroxychloroquine tablet 200 mg  200 mg Oral BID Fernanda Ann MD   200 mg at 18 0924    irbesartan tablet 300 mg  300 mg Oral Daily Magdy Bella MD   300 mg at 18 0924    labetalol injection 10 mg  10 mg Intravenous Q6H PRN Magdy Bella MD   10 mg at 18 0455    labetalol injection 10 mg  10 mg Intravenous Once Magdy Bella MD        labetalol injection 10 mg  10 mg Intravenous Q4H PRN Emeka Martínez MD   10 mg at 18 1732    labetalol tablet 400 mg  400 mg Oral Q12H Harsh Benitez MD   400 mg at 18 0924    latanoprost 0.005 % ophthalmic solution 1 drop  1 drop Both Eyes Daily Magdy Bella MD   1 drop at 18    levothyroxine tablet 75 mcg  75 mcg Oral Before breakfast Fernanda Ann MD   75 mcg at 18 05    nystatin-triamcinolone cream   Topical (Top) QID PRN Chandan Holt PA-C        ondansetron disintegrating tablet 8 mg  8 mg Oral Q8H PRN Fernanda Ann MD   8 mg at 18 0515    pantoprazole EC tablet 40 mg  40 mg Oral Daily Fernanda Ann MD   40 mg at 18 09    promethazine tablet 25 mg  25 mg Oral Q6H PRN Fernanda Ann MD        rosuvastatin tablet 40 mg  40 mg Oral QHS Fernanda Ann MD   40 mg at 18 210    sodium chloride 0.9% flush 3 mL  3 mL Intravenous PRN Fernanda Ann MD        torsemide tablet 20 mg  20 mg Oral BID Emeka Martínez MD   20 mg at 18 0924       Interval HPI:   Overnight events: bp remains labile  Eatin%  Nausea: No  Hypoglycemia and intervention: No  Fever: No  TPN and/or TF: No  If yes, type of TF/TPN and rate: n/a    PMH, PSH, FH, SH updated and reviewed         Review of Systems  Constitutional: Negative for weight changes.  Eyes: Negative for visual disturbance.  Respiratory: Negative for cough.   Cardiovascular: Negative for chest pain.  Gastrointestinal: Negative for  nausea.  Endocrine: Negative for polyuria, polydipsia.  Musculoskeletal: Negative for back pain.  Skin: Negative for rash.  Neurological: Negative for syncope.  Psychiatric/Behavioral: Negative for depression.    PHYSICAL EXAMINATION:  Vitals:    01/25/18 1101   BP:    Pulse: 63   Resp:    Temp:      Body mass index is 19.44 kg/m².    Physical Exam   Constitutional:  Well developed, well nourished, NAD.  ENT: External ears no masses with nose patent; normal hearing.   Neck:  Supple; trachea midline; no thyromegaly.   Cardiovascular: Normal heart sounds, no LE edema.     Lungs:  Normal effort; lungs anterior bilaterally clear to auscultation.  Abdomen:  Soft, no masses,  no hernias.  MS: No clubbing or cyanosis of nails noted; normal gait   Skin: No rashes, lesions, or ulcers; no nodules.  Psychiatric: Good judgement and insight; normal mood and affect.  Neurological: Cranial nerves are grossly intact.   .     ASSESSMENT and PLAN:    High aldosterone    Recommend repeating 8am aldosterone and renin level  Previous aldosterone/renin level this admission suspicious for primary hyperaldosteronism given elevated aldosterone and suppressed renin with ratio >20:1 and renin level less than 1.0. Potassium level normal on potassium sparing diuretic  Prior CT abd with contrast from 2017 did not suggest any adrenal abnormality  If ratio remains elevated, would recommend repeating CT adrenal imaging   Treatment options could then include medical management vs surgical approach        Impaired glucose tolerance    bg at goal  Periodic a1c monitoring          Postsurgical hypothyroidism    Continue levothyroxine 75mcg daily  tsh at goal  S/p left lobectomy          Other secondary hypertension    Evaluating for secondary htn, suspicious for primary hyperaldosteronism  Nephrology evaluating for FRAN  metanephrines pending but difficult to interpret with recent clonidine  Primary hyperaldosterone eval as above                  [place]  MD January  Endocrinology  [facility], Britney Waters MD,  have personally taken the history and examined the patient and agree with the resident's note as stated above.    Would defer to renal as to safety of aldactone in ckd  If ok, I would start it 12.5 bid and up titrate accordingly     I dont suspect FRAN with low renin state     Ok to image but based on age would need AVS to confirm laterality

## 2018-01-25 NOTE — PROGRESS NOTES
Progress Note  Hospital Medicine    Admit Date: 1/14/2018  Length of Stay:  LOS: 11 days     SUBJECTIVE:         Follow-up For:  Hypertensive emergency    HPI/Interval history (See H&P for complete P,F,SHx) :     1/23/18   s/p cardiology evaluation.  willing to try clonidine nightly again,vascular lab to perform renal artery ultrasound, due to prior inconclusive test evaluating for renal artery stenosis,Aldosterone 31.6, plasma Metanephrines  pending. received one dose of xanax for anxiety    1/24/18  Aldosterone/renin ratio elevated at 205. Endocrine consulted    1/25/18  Headache resovled with IV tylenol. interested in clonidine patch. PT/OT Consulted . started on spironolactone 12.5mg BID per endocrinology.  Torsemide decreased to 20mg daily. monitor K in AM      Review of Systems: List if applicable  Pain scale: 0/10  Constitutional- Positive for Weakness    denies vertigo    OBJECTIVE:     Vital Signs Range (Last 24H):  Temp:  [97.7 °F (36.5 °C)-98.5 °F (36.9 °C)]   Pulse:  [59-71]   Resp:  [19-20]   BP: (114-183)/(57-74)   SpO2:  [96 %-99 %]     Physical Exam:  General- Patient alert and oriented x3   HEENT- PERRLA, EOMI, OP clear  Neck- No JVD, Lymphadenopathy, Thyromegaly  CV- Regular rate and rhythm, No Murmur/ruma/rubs  Resp- Lungs CTA Bilaterally, No increased WOB  Abdomen- Non tender/non-distended, BS normoactive x4 quads, no HSM  Extrem- No cyanosis, clubbing, edema.   Skin- No rashes, lesions, ulcers  Neuro- able to move upper and lower extremities without limitation      Medications:  Medication list was reviewed and changes noted under Assessment/Plan.      Current Facility-Administered Medications:     acetaminophen tablet 650 mg, 650 mg, Oral, Q6H PRN, Emeka Martínez MD, 650 mg at 01/24/18 0556    aspirin EC tablet 81 mg, 81 mg, Oral, Daily, Magdy Bella MD, 81 mg at 01/25/18 0924    butalbital-acetaminophen-caffeine -40 mg per tablet 1 tablet, 1 tablet, Oral, Q4H PRN, Emeka ERWIN  MD Mauricio, 1 tablet at 01/22/18 2052    cholestyramine 4 gram packet 4 g, 1 packet, Oral, TID WM, Magdy Bella MD, 4 g at 01/24/18 1822    citalopram tablet 20 mg, 20 mg, Oral, Daily, Fernanda Ann MD, 20 mg at 01/25/18 0924    cloNIDine tablet 0.2 mg, 0.2 mg, Oral, QHS, Bryn Torres MD, 0.2 mg at 01/24/18 2224    dextrose 50% injection 12.5 g, 12.5 g, Intravenous, PRN, Fernanda Ann MD    dextrose 50% injection 25 g, 25 g, Intravenous, PRN, Fernanda Ann MD    glucagon (human recombinant) injection 1 mg, 1 mg, Intramuscular, PRN, Fernanda Ann MD    glucose chewable tablet 16 g, 16 g, Oral, PRN, Fernanda Ann MD    glucose chewable tablet 24 g, 24 g, Oral, PRN, Fernanda Ann MD    hydrALAZINE tablet 50 mg, 50 mg, Oral, Q8H, Magdy Bella MD, 50 mg at 01/25/18 0525    hydroxychloroquine tablet 200 mg, 200 mg, Oral, BID, Fernanda Ann MD, 200 mg at 01/25/18 0924    irbesartan tablet 300 mg, 300 mg, Oral, Daily, Magdy Bella MD, 300 mg at 01/25/18 0924    labetalol injection 10 mg, 10 mg, Intravenous, Q6H PRN, Magdy Bella MD, 10 mg at 01/23/18 0455    labetalol injection 10 mg, 10 mg, Intravenous, Once, Magdy Bella MD    labetalol injection 10 mg, 10 mg, Intravenous, Q4H PRN, Emeka Martínez MD, 10 mg at 01/22/18 1732    labetalol tablet 400 mg, 400 mg, Oral, Q12H, Harsh Benitez MD, 400 mg at 01/25/18 0924    latanoprost 0.005 % ophthalmic solution 1 drop, 1 drop, Both Eyes, Daily, Magdy Bella MD, 1 drop at 01/25/18 0924    levothyroxine tablet 75 mcg, 75 mcg, Oral, Before breakfast, Fernanda Ann MD, 75 mcg at 01/25/18 0525    nystatin-triamcinolone cream, , Topical (Top), QID PRN, Chandan Holt PA-C    ondansetron disintegrating tablet 8 mg, 8 mg, Oral, Q8H PRN, Fernanda Ann MD, 8 mg at 01/21/18 0515    pantoprazole EC tablet 40 mg, 40 mg, Oral, Daily, Fernanda Ann MD, 40 mg at 01/25/18 0923     promethazine tablet 25 mg, 25 mg, Oral, Q6H PRN, Fernanda Ann MD    rosuvastatin tablet 40 mg, 40 mg, Oral, QHS, Fernanda Ann MD, 40 mg at 01/24/18 2107    sodium chloride 0.9% flush 3 mL, 3 mL, Intravenous, PRN, Fernanda Ann MD    torsemide tablet 20 mg, 20 mg, Oral, BID, Emeka Martínez MD, 20 mg at 01/25/18 0924    acetaminophen, butalbital-acetaminophen-caffeine -40 mg, dextrose 50%, dextrose 50%, glucagon (human recombinant), glucose, glucose, labetalol, labetalol, nystatin-triamcinolone, ondansetron, promethazine, sodium chloride 0.9%    Laboratory/Diagnostic Data:  Reviewed and noted in plan where applicable- Please see chart for full lab data.      Recent Labs  Lab 01/23/18  0351 01/24/18  0443 01/25/18  0659   WBC 4.40 2.92* 3.36*   HGB 8.9* 8.7* 8.8*   HCT 27.7* 28.4* 27.5*    164 170         Recent Labs  Lab 01/23/18  0351 01/24/18  0443 01/25/18  0659    136 135*   K 3.9 4.2 4.2    109 107   CO2 19* 19* 21*   BUN 61* 57* 60*   CREATININE 2.3* 2.5* 2.6*   GLU 79 71 79   CALCIUM 8.5* 8.5* 8.3*   MG 1.8 1.6 1.5*   PHOS 4.7* 5.2* 4.7*         Recent Labs  Lab 01/23/18  0351 01/24/18  0443 01/25/18  0659   ALKPHOS 76 77 81   ALT 14 16 22   AST 30 36 44*   ALBUMIN 2.5* 2.4* 2.4*   PROT 6.3 6.1 6.2   BILITOT 0.1 0.2 0.2        Microbiology labs for the last week  Microbiology Results (last 7 days)     ** No results found for the last 168 hours. **           Imaging Results          US Renal Artery Stenosis Hyperten (xpd) (Final result)     Abnormal  Result time 01/18/18 19:13:43    Final result by Daksha Arevalo MD (01/18/18 19:13:43)                 Impression:         1. The origin of the right renal artery is not definitely visualized and there are questionable tardus parvus waveforms within the segmental branches of the right renal artery, which can be seen in setting of renal artery stenosis.  CTA abdomen pelvis is recommended to evaluate the renal artery  ostium.    2. Sequela of medical renal disease.    3. Patient is status post left nephrectomy.    EPIC notification activated.      ______________________________________     Electronically signed by resident: HERSON KHOURY MD  Date:     01/18/18  Time:    18:49            As the supervising and teaching physician, I personally reviewed the images and resident's interpretation and I agree with the findings.      ______________________________________     Electronically signed by resident: HERSON KHOURY MD  Date:     01/18/18  Time:    19:06            As the supervising and teaching physician, I personally reviewed the images and resident's interpretation and I agree with the findings.          Electronically signed by: TWAN CLARK MD  Date:     01/18/18  Time:    19:13              Narrative:    Time of Procedure: 01/18/18 17:50:00  Accession # [id number].    HISTORY: Hypertension    TECHNIQUE: Doppler ultrasound of the kidneys was performed.    COMPARISON: CT abdomen and pelvis with contrast on 3/8/2017    FINDINGS:      RIGHT: The right kidney measures 11.8 cm and demonstrates mild increased cortical echogenicity with no evidence of cortical thinning.  No nephrolithiasis or solid renal masses.  Initial images demonstrate mild right-sided hydronephrosis that resolves after urinary bladder emptying.  Perfusion to the right kidney is mildly decreased.  Resistive indices are elevated as follow: Upper segmental artery 0.76, middle segmental artery 0.70, and lower segmental artery 0.80. The main renal artery is not definitely visualized at its origin.   The maximum velocity of the right renal artery measures 64 cm/s with a maximum renal artery to aortic ratio of 0.6. The segmental branches of the right renal artery demonstrate possible tardus parvus waveform.     LEFT: Patient status post left nephrectomy.                             X-Ray Chest 1 View (Final result)  Result time 01/15/18 09:25:06    Final result by  Roseline Zhu MD (01/15/18 09:25:06)                 Impression:      Interval diuresis with resolution of pulmonary edema.  However a small amount of RIGHT subpulmonic pleural fluid persists, creating a juxtaphrenic peak sign where it inserts into the caudal margin of the RIGHT inferior accessory fissure.    No new disease.      Electronically signed by: Roseline Zhu MD  Date:     01/15/18  Time:    09:25              Narrative:    Time of Procedure: 01/15/18 05:50:00  Accession # [id number]    Comparison: 11/27/2017.    Number of views: 1.    Findings:  Chest radiograph performed 11/27/2017 revealed extensive interstitial pulmonary edema plus dependent RIGHT pleural fluid and some consolidation in the lateral aspect the RIGHT lower lung zone.    There is been marked improvement in these abnormalities on today's study.  There is still a small amount of dependent RIGHT pleural fluid, well appreciated because it inserts into the inferior aspect of the RIGHT inferior accessory fissure creating a juxtaphrenic peak sign.  However there is no evidence of pulmonary edema and no LEFT pleural fluid.    There is no airspace disease, pneumothorax, pneumomediastinum, pneumoperitoneum or significant osseous abnormality.  Metallic hardware projects over the upper abdomen similar to the earlier study.                             MRI Brain Without Contrast (Final result)  Result time 01/14/18 13:10:30    Final result by Janeth Mcgarry MD (01/14/18 13:10:30)                 Impression:        No acute abnormality.  ______________________________________     Electronically signed by resident: THOR SARMIENTO MD  Date:     01/14/18  Time:    12:48            As the supervising and teaching physician, I personally reviewed the images and resident's interpretation and I agree with the findings.          Electronically signed by: JANETH MCGARRY MD  Date:     01/14/18  Time:    13:10              Narrative:    CLINICAL  INDICATION: 72 year old F with severe vertigo in setting of hypertensive emergency     TECHNIQUE: Routine MRI brain without contrast. Multiplanar multisequence MR imaging of the brain was performed without intravenous contrast.    COMPARISON: CT head 1/14/17    FINDINGS:    Intracranial compartment:    Ventricles and sulci are normal in size for age without evidence of hydrocephalus. No extra-axial blood or fluid collections.    The brain parenchyma demonstrate mild chronic microvascular ischemic change in the supratentorial white matter. No mass lesion, acute hemorrhage, edema or acute infarct.    Normal vascular flow voids are preserved.      Skull/extracranial contents (limited evaluation): Bone marrow signal intensity is normal.                             CT Head Without Contrast (Final result)  Result time 01/14/18 06:57:56    Final result by Gayle Aquino MD (01/14/18 06:57:56)                 Impression:      1.  No CT evidence of acute intracranial abnormality. Clinical correlation and further evaluation as warrented.     2.  Mild generalized cerebral volume loss and microangiopathic change, similar to prior examination.      Electronically signed by: GAYLE AQUINO  Date:     01/14/18  Time:    06:57              Narrative:    TECHNIQUE:  Multiple contiguous axial images of the brain were obtained from base to the vertex without the use of intravenous contrast. Sagittal and coronal reconstruction images were formatted in postprocessing.    COMPARISON:    Head CT 9/20/2017    FINDINGS:      There is no acute intracranial hemorrhage, hydrocephalus, midline shift or mass effect. There is mild generalized cerebral volume loss and mild bilateral deep cerebral white matter microangiopathic changes.  Gray-white matter differentiation otherwise is maintained. The basal cisterns are patent. The mastoid air cells and paranasal sinuses are clear of acute process. The visualized bones of the calvarium demonstrate no  "acute osseous abnormality.                              Estimated body mass index is 19.44 kg/m² as calculated from the following:    Height as of this encounter: 5' 5" (1.651 m).    Weight as of this encounter: 53 kg (116 lb 13.5 oz).    I & O (Last 24H):    Intake/Output Summary (Last 24 hours) at 01/25/18 1138  Last data filed at 01/24/18 2300   Gross per 24 hour   Intake              100 ml   Output              600 ml   Net             -500 ml       Estimated Creatinine Clearance: 16.4 mL/min (based on SCr of 2.6 mg/dL (H)).    ASSESSMENT/PLAN:     Active Problems:    Active Hospital Problems    Diagnosis  POA    *Hypertensive emergency [I16.1]Off Necardipine drip l likely rebound HTN from stopping clonidine.SBP 190s. does not want to take amlodipine due to history of pedal edema. BP - 1improved 150s.continue current  regimen of hydralazine 50mg TID, irbesartan 300mgqam , labetalol 400mg BID, torsemide BID.s/p cardiology evaluatin.  willing to try clonidine nightly again, renal artery ultrasound - 1/24- There is insignificant stenosis (0-59%) in the Right renal artery., Aldosterone 31.6,Aldosterone/renin ratio elevated at 205. Endocrine consulted .Headache resovled with IV tylenol. interested in clonidine patch. PT/OT Consulted . . started on spironolactone 12.5mg BID per endocrinology.  Torsemide decreased to 20mg daily. monitor K in AM   plasma Metanephrines,  pending.    Headache - Headache resovled with IV tylenol.   Yes    Aortic stenosis [I35.0]Moderate aortic stenosis, PABLITO = 0.93 cm2  Unknown    Cor pulmonale, chronic [I27.81]Pulmonary hypertension. The estimated PA systolic pressure is 68 mmHg.   Unknown    Chronic kidney disease, stage IV (severe) [N18.4]follow up with nephrology   Unknown    Vertigo [R42]denies vertigo, only had lightheadedness. MRI and CT brain -No acute abnormality.  Yes    Dizzy [R42]resolved as above  Yes    Anemia associated with chronic renal failure [D63.1]Hb 8.9 . " Normocytic   Yes    CKD (chronic kidney disease) [N18.9]4- Solitary kidney. seems to be at baseline, follows up with nephrology.Followed by Dr. Ruffin in nephrology  Yes    Hyperlipidemia [E78.5]on rosuvastatin and cholestyramine  Yes    CAD (coronary artery disease) [I25.10]Asymptomatic. continue  low dose ASA.    Chronic diastolic heart failure - Well compensated. Continue daily weights and torsemide 20mg BID.  PRN weight gain/leg swelling/SOB  Leucopenia/ Granulocytopenia -2.9/ 1.3. monitor    Yes     Chronic    Lupus (systemic lupus erythematosus) [M32.9].Continue HCQ daily  .UA with 2+ protein and positive for occult blood. She has a hx of single kidney biopsy is high risk. normal C3, C4  Yes     Chronic     Early 1990's developed acute respiratory failure and spent weeks in ICU [facility]   Positive CARYN, SSB  Hx Rash, leukopenia,         Resolved Hospital Problems    Diagnosis Date Resolved POA   No resolved problems to display.         Disposition- PT/OT Consulted     DVT prophylaxis addressed with: B/L SCD            Magyd Bella MD  Attending Staff Physician  [facility]  pager- [phone]  Spectralink - [phone]

## 2018-01-25 NOTE — ASSESSMENT & PLAN NOTE
- UA with no evidence of blood   - UPCR has in creassed from ~0.6 to 1.22  - UA on 1/14/2018 with 2+ protein and positive for occult blood  - She has a hx of singe kidney biopsy is high risk  - Cont Plaquenil  - Will check complement levels and a DS-DNA AB

## 2018-01-25 NOTE — ASSESSMENT & PLAN NOTE
Recommend repeating 8am aldosterone and renin level  Previous aldosterone/renin level this admission suspicious for primary hyperaldosteronism given elevated aldosterone and suppressed renin with ratio >20:1 and renin level less than 1.0. Potassium level normal on potassium sparing diuretic  Prior CT abd with contrast from 2017 did not suggest any adrenal abnormality  If ratio remains elevated, would recommend repeating CT adrenal imaging   Treatment options could then include medical management vs surgical approach

## 2018-01-25 NOTE — SUBJECTIVE & OBJECTIVE
Interval HPI:   Overnight events: bp remains labile  Eatin%  Nausea: No  Hypoglycemia and intervention: No  Fever: No  TPN and/or TF: No  If yes, type of TF/TPN and rate: n/a    PMH, PSH, FH, SH updated and reviewed         Review of Systems  Constitutional: Negative for weight changes.  Eyes: Negative for visual disturbance.  Respiratory: Negative for cough.   Cardiovascular: Negative for chest pain.  Gastrointestinal: Negative for nausea.  Endocrine: Negative for polyuria, polydipsia.  Musculoskeletal: Negative for back pain.  Skin: Negative for rash.  Neurological: Negative for syncope.  Psychiatric/Behavioral: Negative for depression.    PHYSICAL EXAMINATION:  Vitals:    18 1101   BP:    Pulse: 63   Resp:    Temp:      Body mass index is 19.44 kg/m².    Physical Exam   Constitutional:  Well developed, well nourished, NAD.  ENT: External ears no masses with nose patent; normal hearing.   Neck:  Supple; trachea midline; no thyromegaly.   Cardiovascular: Normal heart sounds, no LE edema.     Lungs:  Normal effort; lungs anterior bilaterally clear to auscultation.  Abdomen:  Soft, no masses,  no hernias.  MS: No clubbing or cyanosis of nails noted; normal gait   Skin: No rashes, lesions, or ulcers; no nodules.  Psychiatric: Good judgement and insight; normal mood and affect.  Neurological: Cranial nerves are grossly intact.

## 2018-01-25 NOTE — ASSESSMENT & PLAN NOTE
Evaluating for secondary htn, suspicious for primary hyperaldosteronism  Nephrology evaluating for FRAN  metanephrines pending but difficult to interpret with recent clonidine  Primary hyperaldosterone eval as above

## 2018-01-25 NOTE — DISCHARGE SUMMARY
"[facility] Hospital Medicine  Discharge Summary      Patient Name: Ewelina Arnold  MRN: [id number]  Admission Date: 1/14/2018  Hospital Length of Stay: 12 days  Discharge Date and Time: 1/26/18  Attending Physician: No att. 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  Pts BP was found to be >230 and she was placed on a cardene drip, necessitating ICU admission.      Patient was prescribed clonidine (Rx by Cardiology) on 1/10/18 as she was having high blood pressure.  Pt took the first dose of clonidine 1/10/18.  She began having dizziness, vertigo, headache and nausea for the last 2 days. Pt describes the headache as constant located in her occipital region.  This concerned the patient, as it was lasting for 2 days.  She took a meclizine tablet to improve her nausea but it didn't help.  She also took a tylenol which did not relieve her pain.  She woke up with AM, got out of bed and felt "terrible, unsteady".  She called the on-call nurse that she was unable to modify her dosing of Clonidine and was recommended to come to the ER.      Patient mentions that she was seen 8 days ago in Urgent Care for hypertension. Patient denies any chest pain, SOB, headache at this time, recent fall or head injury, sudden vision changes, vomiting, dysuria, increased urination, sudden onset of abdominal pain or back pain. Patient reports compliance with all her blood pressure medications. She states that she normally takes her blood pressure " medications at around 8 AM    * No surgery found *      Hospital Course:     Active Problems:            Active Hospital Problems     Diagnosis   POA    *Hypertensive emergency [I16.1]Off Necardipine drip l likely rebound HTN from stopping clonidine.SBP 190s. does not want to take amlodipine due to history of pedal edema. BP - 1improved 150s.continue current  regimen of hydralazine 50mg TID, irbesartan 300mgqam , labetalol 400mg BID, torsemide BID.s/p cardiology evaluatin.  willing to try clonidine nightly again, renal artery ultrasound - 1/24- There is insignificant stenosis (0-59%) in the Right renal artery., Aldosterone 31.6,Aldosterone/renin ratio elevated at 205. Endocrine consulted, started on heeavxnjsdebdxk12.5mg BID   plasma Metanephrines,  pending.     Headache - occipital. started on IV tylenol x 1 dose   Yes    Aortic stenosis [I35.0]Moderate aortic stenosis, PABLITO = 0.93 cm2   Unknown    Cor pulmonale, chronic [I27.81]Pulmonary hypertension. The estimated PA systolic pressure is 68 mmHg.    Unknown    Chronic kidney disease, stage IV (severe) [N18.4]follow up with nephrology    Unknown    Vertigo [R42]denies vertigo, only had lightheadedness. MRI and CT brain -No acute abnormality.   Yes    Dizzy [R42]resolved as above   Yes    Anemia associated with chronic renal failure [D63.1]Hb 8.9 . Normocytic    Yes    CKD (chronic kidney disease) [N18.9]4- Solitary kidney. seems to be at baseline, follows up with nephrology.Followed by Dr. Ruffin in nephrology   Yes    Hyperlipidemia [E78.5]on rosuvastatin and cholestyramine   Yes    CAD (coronary artery disease) [I25.10]Asymptomatic. continue  low dose ASA.     Chronic diastolic heart failure - Well compensated. Continue daily weights and torsemide 20mg BID.  PRN weight gain/leg swelling/SOB  Leucopenia/ Granulocytopenia -2.9/ 1.3. monitor     Yes       Chronic    Lupus (systemic lupus erythematosus) [M32.9].Continue HCQ daily  .UA with 2+ protein and  positive for occult blood. She has a hx of singe kidney biopsy is high risk   Yes       Chronic       Early 1990's developed acute respiratory failure and spent weeks in ICU [facility]   Positive CARYN, SSB  Hx Rash, leukopenia,          Being discharged with cardiology, endocrine and nephrology follow up    Consults:   Consults         Status Ordering Provider     Inpatient consult to Cardiology  Once     Provider:  (Not yet assigned)    Completed SJ BUTTS     Inpatient consult to Critical Care Medicine  Once     Provider:  (Not yet assigned)    Completed JUAN F BATRES     Inpatient consult to Endocrinology  Once     Provider:  (Not yet assigned)    Completed SJ BUTTS     Inpatient consult to Nephrology  Once     Provider:  (Not yet assigned)    Completed LOLY HANNAH          Final Active Diagnoses:    Diagnosis Date Noted POA    High aldosterone [E26.9] 01/25/2018 Yes    Impaired glucose tolerance [R73.02] 01/25/2018 Yes    MARYAM on CKD 4 [N17.9] 01/21/2018 Yes    Aortic stenosis [I35.0] 01/16/2018 Yes    Cor pulmonale, chronic [I27.81] 01/16/2018 Yes    Chronic kidney disease, stage IV (severe) [N18.4] 01/16/2018 Yes    Anemia associated with chronic renal failure [D63.1] 07/25/2017 Yes    CKD (chronic kidney disease) [N18.9] 05/16/2017 Yes    Hyperlipidemia [E78.5]  Yes    Postsurgical hypothyroidism [E89.0] 05/13/2015 Yes    CAD (coronary artery disease) [I25.10] 10/05/2012 Yes     Chronic    Other secondary hypertension [I15.8] 10/05/2012 Yes    Lupus (systemic lupus erythematosus) [M32.9] 08/17/2012 Yes     Chronic      Problems Resolved During this Admission:    Diagnosis Date Noted Date Resolved POA    PRINCIPAL PROBLEM:  Hypertensive emergency [I16.1] 01/14/2018 01/16/2018 Yes    Vertigo [R42] 01/14/2018 01/16/2018 Yes    Dizzy [R42] 01/14/2018 01/16/2018 Yes      Discharged Condition: fair    Disposition: Home or Self Carehome    Follow Up:  Follow-up Information      "Kalie Walton MD On 1/29/2018.    Specialty:  Family Medicine  Why:  appointment 10:40  Contact information:  [address]  [address]  [facility] [id number] [phone]             Maciel Ruffin MD.    Specialty:  Nephrology  Contact information:  [address]  [facility] [id number] [phone]             LAB, APPOINTMENT [facility] On 1/26/2018.    Why:  lab appointment 08:00           Markus Dilshad - Nutrition.    Specialty:  Nutrition  Contact information:  [address]  [facility] [address] [phone]  Additional information:  [facility]               Patient Instructions:     WALKER FOR HOME USE   Order Comments: Discharge today   Order Specific Question Answer Comments   Type of Walker: Adult (5'4"-6'6")    With wheels? Yes    Height: 5' 5" (1.651 m)    Weight: 53 kg (116 lb 13.5 oz)    Length of need (1-99 months): 99    Does patient have medical equipment at home? none    Please check all that apply: Patient is unable to safely ambulate without equipment.    [facility]   Expected Date of Delivery: 1/25/2018      Ambulatory Referral to Cardiology   Referral Priority: Routine Referral Type: Consultation   Referral Reason: Specialty Services Required    Requested Specialty: Cardiology    Number of Visits Requested: 1      Ambulatory Referral to Endocrinology   Referral Priority: Routine Referral Type: Consultation   Requested Specialty: Endocrinology    Number of Visits Requested: 1      Ambulatory Referral to Nephrology   Referral Priority: Routine Referral Type: Consultation   Referral Reason: Specialty Services Required    Requested Specialty: Nephrology    Number of Visits Requested: 1      Ambulatory Referral to Cardiology   Referral Priority: Routine Referral Type: Consultation   Referral Reason: Specialty Services Required    Requested Specialty: Cardiology    Number of Visits Requested: " 1        Medications:  Reconciled Home Medications:   Discharge Medication List as of 1/26/2018  1:11 PM      START taking these medications    Details   labetalol (NORMODYNE) 200 MG tablet Take 2 tablets (400 mg total) by mouth every 12 (twelve) hours., Starting Thu 1/25/2018, Until Fri 1/25/2019, Normal      spironolactone (ALDACTONE) 25 MG tablet Take 0.5 tablets (12.5 mg total) by mouth 2 (two) times daily., Starting Fri 1/26/2018, Until Sat 1/26/2019, Normal         CONTINUE these medications which have CHANGED    Details   cloNIDine (CATAPRES) 0.2 MG tablet Take 1 tablet (0.2 mg total) by mouth every evening., Starting Thu 1/25/2018, Until Fri 1/25/2019, Normal      hydrALAZINE (APRESOLINE) 50 MG tablet Take 1 tablet (50 mg total) by mouth every 8 (eight) hours., Starting Thu 1/25/2018, Until Fri 1/25/2019, Normal      irbesartan (AVAPRO) 300 MG tablet Take 1 tablet (300 mg total) by mouth once daily. please take at 9AM, Starting Fri 1/26/2018, Until Sat 1/26/2019, Normal      torsemide (DEMADEX) 20 MG Tab Take 1 tablet (20 mg total) by mouth 2 (two) times daily., Starting Thu 1/25/2018, Until Fri 1/25/2019, Normal         CONTINUE these medications which have NOT CHANGED    Details   butalbital-acetaminophen-caffeine -40 mg (FIORICET, ESGIC) -40 mg per tablet Take 1 tablet by mouth every 4 (four) hours as needed for Pain., Starting Thu 1/4/2018, Until Sat 2/3/2018, Normal      citalopram (CELEXA) 20 MG tablet TAKE 1 TABLET (20 MG TOTAL) BY MOUTH ONCE DAILY., Starting Fri 12/1/2017, Until Sat 12/1/2018, Normal      hydroxychloroquine (PLAQUENIL) 200 mg tablet TAKE 1 TABLET TWICE DAILY, Normal      levothyroxine (SYNTHROID) 75 MCG tablet Take 1 tablet (75 mcg total) by mouth before breakfast., Starting Thu 11/2/2017, Until Wed 1/31/2018, Normal      rosuvastatin (CRESTOR) 40 MG Tab TAKE 1 TABLET EVERY EVENING, Normal      acetaminophen (TYLENOL) 500 MG tablet Take 500 mg by mouth every 6 (six) hours  as needed for Pain., Until Discontinued, Historical Med      ammonium lactate 12 % Crea Apply 1 application topically 2 (two) times daily as needed., Starting 3/24/2015, Until Discontinued, Normal      aspirin (ECOTRIN) 81 MG EC tablet Take 1 tablet (81 mg total) by mouth once daily., Starting Sat 3/12/2016, Until Sat 1/6/2018, OTC      cholestyramine (QUESTRAN) 4 gram packet Take 1 packet (4 g total) by mouth 3 (three) times daily with meals., Starting Wed 4/12/2017, Until Mon 12/4/2017, Normal      conjugated estrogens (PREMARIN) vaginal cream Use 1g nightly for two weeks, then 1g twice per week., Print      fexofenadine (ALLEGRA) 180 MG tablet Take 1 tablet (180 mg total) by mouth once daily., Starting Sat 8/1/2015, Until Sat 1/6/2018, Normal      hyoscyamine (LEVSIN/SL) 0.125 mg Subl Place 1 tablet (0.125 mg total) under the tongue 2 (two) times daily as needed., Starting 12/2/2016, Until Discontinued, Normal      ipratropium (ATROVENT) 0.03 % nasal spray 2 sprays by Nasal route 2 (two) times daily as needed for Rhinitis., Starting 1/9/2017, Until Discontinued, Normal      latanoprost 0.005 % ophthalmic solution INSTILL 1 DROP INTO BOTH EYES EVERY DAY, Normal      meclizine (ANTIVERT) 12.5 mg tablet Take 1 tablet (12.5 mg total) by mouth 3 (three) times daily as needed., Starting Mon 1/15/2018, Until Sun 4/15/2018, Normal      multivitamin capsule Take 1 capsule by mouth once daily., Until Discontinued, Historical Med      nitroGLYCERIN (NITROSTAT) 0.4 MG SL tablet Place 1 tablet (0.4 mg total) under the tongue every 5 (five) minutes as needed for Chest pain., Starting Mon 1/15/2018, Until Sun 4/15/2018, Normal      omeprazole (PRILOSEC) 40 MG capsule Take 1 capsule (40 mg total) by mouth once daily., Starting 5/9/2017, Until Wed 5/9/18, Normal      TENS units Sravanthi 1 application by Misc.(Non-Drug; Combo Route) route daily as needed (pain)., Starting 11/12/2012, Until Discontinued, Print      triamcinolone  acetonide 0.1% (KENALOG) 0.1 % paste PLACE ONTO TEETH TWICE DAILY, Normal      VITAMIN D2 50,000 unit capsule TAKE 1 CAPSULE (50,000 UNITS TOTAL) EVERY 30 DAYS, Normal         STOP taking these medications       carvedilol (COREG) 12.5 MG tablet Comments:   Reason for Stopping:         betamethasone dipropionate (DIPROLENE) 0.05 % cream Comments:   Reason for Stopping:         ENSURE ACTIVE CLEAR Liqd Comments:   Reason for Stopping:         nystatin (MYCOSTATIN) ointment Comments:   Reason for Stopping:         nystatin-triamcinolone (MYCOLOG II) cream Comments:   Reason for Stopping:               Significant Diagnostic Studies: Labs:   BMP:     Recent Labs  Lab 01/26/18 0411   GLU 78   *   K 4.4      CO2 20*   BUN 60*   CREATININE 2.6*   CALCIUM 8.6*   MG 2.2   , CMP     Recent Labs  Lab 01/26/18 0411   *   K 4.4      CO2 20*   GLU 78   BUN 60*   CREATININE 2.6*   CALCIUM 8.6*   PROT 6.6   ALBUMIN 2.7*   BILITOT 0.2   ALKPHOS 81   AST 38   ALT 21   ANIONGAP 9   ESTGFRAFRICA 20.5*   EGFRNONAA 17.8*   , CBC     Recent Labs  Lab 01/26/18 0411   WBC 3.61*   HGB 8.8*   HCT 28.0*      , INR   Lab Results   Component Value Date    INR 1.0 11/27/2017    INR 1.1 05/09/2013    INR 1.0 05/03/2013   , Lipid Panel   Lab Results   Component Value Date    CHOL 123 03/23/2017    HDL 39 (L) 03/23/2017    LDLCALC 68.2 03/23/2017    TRIG 79 03/23/2017    CHOLHDL 31.7 03/23/2017   , Troponin No results for input(s): TROPONINI in the last 168 hours., A1C: No results for input(s): HGBA1C in the last 4320 hours. and All labs within the past 24 hours have been reviewed  Microbiology:   Blood Culture No results found for: LABBLOO, Sputum Culture No results found for: GSRESP, RESPIRATORYC and Urine Culture    Lab Results   Component Value Date    LABURIN  10/29/2015     Multiple organisms isolated. None in predominance.  Repeat if    LABURIN clinically necessary. 10/29/2015     Radiology:  Imaging Results           US Renal Artery Stenosis Hyperten (xpd) (Final result)     Abnormal  Result time 01/18/18 19:13:43    Final result by Daksha Clark MD (01/18/18 19:13:43)                 Impression:         1. The origin of the right renal artery is not definitely visualized and there are questionable tardus parvus waveforms within the segmental branches of the right renal artery, which can be seen in setting of renal artery stenosis.  CTA abdomen pelvis is recommended to evaluate the renal artery ostium.    2. Sequela of medical renal disease.    3. Patient is status post left nephrectomy.    EPIC notification activated.      ______________________________________     Electronically signed by resident: HERSON KHOURY MD  Date:     01/18/18  Time:    18:49            As the supervising and teaching physician, I personally reviewed the images and resident's interpretation and I agree with the findings.      ______________________________________     Electronically signed by resident: HERSON KHOURY MD  Date:     01/18/18  Time:    19:06            As the supervising and teaching physician, I personally reviewed the images and resident's interpretation and I agree with the findings.          Electronically signed by: DAKSHA CLARK MD  Date:     01/18/18  Time:    19:13              Narrative:    Time of Procedure: 01/18/18 17:50:00  Accession # [id number].    HISTORY: Hypertension    TECHNIQUE: Doppler ultrasound of the kidneys was performed.    COMPARISON: CT abdomen and pelvis with contrast on 3/8/2017    FINDINGS:      RIGHT: The right kidney measures 11.8 cm and demonstrates mild increased cortical echogenicity with no evidence of cortical thinning.  No nephrolithiasis or solid renal masses.  Initial images demonstrate mild right-sided hydronephrosis that resolves after urinary bladder emptying.  Perfusion to the right kidney is mildly decreased.  Resistive indices are elevated as follow: Upper segmental artery  0.76, middle segmental artery 0.70, and lower segmental artery 0.80. The main renal artery is not definitely visualized at its origin.   The maximum velocity of the right renal artery measures 64 cm/s with a maximum renal artery to aortic ratio of 0.6. The segmental branches of the right renal artery demonstrate possible tardus parvus waveform.     LEFT: Patient status post left nephrectomy.                             X-Ray Chest 1 View (Final result)  Result time 01/15/18 09:25:06    Final result by Roseline Zhu MD (01/15/18 09:25:06)                 Impression:      Interval diuresis with resolution of pulmonary edema.  However a small amount of RIGHT subpulmonic pleural fluid persists, creating a juxtaphrenic peak sign where it inserts into the caudal margin of the RIGHT inferior accessory fissure.    No new disease.      Electronically signed by: Roseline Zhu MD  Date:     01/15/18  Time:    09:25              Narrative:    Time of Procedure: 01/15/18 05:50:00  Accession # [id number]    Comparison: 11/27/2017.    Number of views: 1.    Findings:  Chest radiograph performed 11/27/2017 revealed extensive interstitial pulmonary edema plus dependent RIGHT pleural fluid and some consolidation in the lateral aspect the RIGHT lower lung zone.    There is been marked improvement in these abnormalities on today's study.  There is still a small amount of dependent RIGHT pleural fluid, well appreciated because it inserts into the inferior aspect of the RIGHT inferior accessory fissure creating a juxtaphrenic peak sign.  However there is no evidence of pulmonary edema and no LEFT pleural fluid.    There is no airspace disease, pneumothorax, pneumomediastinum, pneumoperitoneum or significant osseous abnormality.  Metallic hardware projects over the upper abdomen similar to the earlier study.                             MRI Brain Without Contrast (Final result)  Result time 01/14/18 13:10:30    Final result by Marina  MD Margarette (01/14/18 13:10:30)                 Impression:        No acute abnormality.  ______________________________________     Electronically signed by resident: THOR SARMIENTO MD  Date:     01/14/18  Time:    12:48            As the supervising and teaching physician, I personally reviewed the images and resident's interpretation and I agree with the findings.          Electronically signed by: JANETH MCGARRY MD  Date:     01/14/18  Time:    13:10              Narrative:    CLINICAL INDICATION: 72 year old F with severe vertigo in setting of hypertensive emergency     TECHNIQUE: Routine MRI brain without contrast. Multiplanar multisequence MR imaging of the brain was performed without intravenous contrast.    COMPARISON: CT head 1/14/17    FINDINGS:    Intracranial compartment:    Ventricles and sulci are normal in size for age without evidence of hydrocephalus. No extra-axial blood or fluid collections.    The brain parenchyma demonstrate mild chronic microvascular ischemic change in the supratentorial white matter. No mass lesion, acute hemorrhage, edema or acute infarct.    Normal vascular flow voids are preserved.      Skull/extracranial contents (limited evaluation): Bone marrow signal intensity is normal.                             CT Head Without Contrast (Final result)  Result time 01/14/18 06:57:56    Final result by Jumana Aquino MD (01/14/18 06:57:56)                 Impression:      1.  No CT evidence of acute intracranial abnormality. Clinical correlation and further evaluation as warrented.     2.  Mild generalized cerebral volume loss and microangiopathic change, similar to prior examination.      Electronically signed by: JUMANA AQUINO  Date:     01/14/18  Time:    06:57              Narrative:    TECHNIQUE:  Multiple contiguous axial images of the brain were obtained from base to the vertex without the use of intravenous contrast. Sagittal and coronal reconstruction images were  formatted in postprocessing.    COMPARISON:    Head CT 9/20/2017    FINDINGS:      There is no acute intracranial hemorrhage, hydrocephalus, midline shift or mass effect. There is mild generalized cerebral volume loss and mild bilateral deep cerebral white matter microangiopathic changes.  Gray-white matter differentiation otherwise is maintained. The basal cisterns are patent. The mastoid air cells and paranasal sinuses are clear of acute process. The visualized bones of the calvarium demonstrate no acute osseous abnormality.                            Cardiac Graphics: Sinus bradycardia @ 53bpm Nonspecific T wave abnormality    Pending Diagnostic Studies:     Procedure Component Value Units Date/Time    Aldosterone [[id number]] Collected:  01/26/18 0827    Order Status:  Sent Lab Status:  In process Updated:  01/26/18 0943    Specimen:  Blood from Blood     CBC with Automated Differential [[id number]] Collected:  01/19/18 0431    Order Status:  Sent Lab Status:  In process Updated:  01/19/18 0432    Specimen:  Blood from Blood     Comprehensive Metabolic Panel (CMP) [[id number]] Collected:  01/19/18 0431    Order Status:  Sent Lab Status:  In process Updated:  01/19/18 0432    Specimen:  Blood from Blood     Magnesium [[id number]] Collected:  01/19/18 0431    Order Status:  Sent Lab Status:  In process Updated:  01/19/18 0432    Specimen:  Blood from Blood     Metanephrines, Plasma Free [[id number]] Collected:  01/23/18 0820    Order Status:  Sent Lab Status:  In process Updated:  01/23/18 0850    Specimen:  Blood from Blood     Phosphorus [[id number]] Collected:  01/19/18 0431    Order Status:  Sent Lab Status:  In process Updated:  01/19/18 0432    Specimen:  Blood from Blood     Renin [[id number]] Collected:  01/26/18 0827    Order Status:  Sent Lab Status:  In process Updated:  01/26/18 0945    Specimen:  Blood from Blood         Indwelling Lines/Drains at time of discharge:   Lines/Drains/Airways          No  matching active lines, drains, or airways              Magdy Bella MD  Department of Hospital Medicine  [facility]

## 2018-01-25 NOTE — SUBJECTIVE & OBJECTIVE
Interval History:   BP better overnight with clonidine, but high though out the day.    UO 1600 ml/24hrs. sCr stable 2.5 this am. Comfortable on RA.    Review of patient's allergies indicates:   Allergen Reactions    Ace inhibitors      Other reaction(s): Lips Swelling    Vioxx [rofecoxib]      Other reaction(s): lips swelling    Bactrim [sulfamethoxazole-trimethoprim]      Pt would like this medication to be added due to elevation of creatinine with single kidney.    Procardia [nifedipine] Nausea Only and Edema     Feet swelling and nausea, now reports that this has happened in the past and required lasix    Arthrotec 50 [diclofenac-misoprostol]      Other reaction(s): Unknown    Bentyl [dicyclomine]      Not effective    Doxycycline      nausea    Imdur [isosorbide mononitrate] Nausea Only    Lomotil [diphenoxylate-atropine]      Stomach cramps, pt vomitted    Lozol [indapamide] Rash    Tramadol Nausea Only     Current Facility-Administered Medications   Medication Frequency    acetaminophen tablet 650 mg Q6H PRN    aspirin EC tablet 81 mg Daily    butalbital-acetaminophen-caffeine -40 mg per tablet 1 tablet Q4H PRN    cholestyramine 4 gram packet 4 g TID WM    citalopram tablet 20 mg Daily    cloNIDine tablet 0.2 mg QHS    dextrose 50% injection 12.5 g PRN    dextrose 50% injection 25 g PRN    glucagon (human recombinant) injection 1 mg PRN    glucose chewable tablet 16 g PRN    glucose chewable tablet 24 g PRN    hydrALAZINE tablet 50 mg Q8H    hydroxychloroquine tablet 200 mg BID    [START ON 1/25/2018] irbesartan tablet 300 mg Daily    labetalol injection 10 mg Q6H PRN    labetalol injection 10 mg Once    labetalol injection 10 mg Q4H PRN    labetalol tablet 400 mg Q12H    latanoprost 0.005 % ophthalmic solution 1 drop Daily    levothyroxine tablet 75 mcg Before breakfast    nystatin-triamcinolone cream QID PRN    ondansetron disintegrating tablet 8 mg Q8H PRN     pantoprazole EC tablet 40 mg Daily    promethazine tablet 25 mg Q6H PRN    rosuvastatin tablet 40 mg QHS    sodium chloride 0.9% flush 3 mL PRN    torsemide tablet 20 mg BID       Objective:     Vital Signs (Most Recent):  Temp: 98.3 °F (36.8 °C) (01/24/18 1731)  Pulse: 69 (01/24/18 1731)  Resp: 20 (01/24/18 1731)  BP: (!) 183/74 (01/24/18 1731)  SpO2: 98 % (01/24/18 1731)  O2 Device (Oxygen Therapy): room air (01/24/18 1731) Vital Signs (24h Range):  Temp:  [97.8 °F (36.6 °C)-98.5 °F (36.9 °C)] 98.3 °F (36.8 °C)  Pulse:  [60-69] 69  Resp:  [18-20] 20  SpO2:  [94 %-98 %] 98 %  BP: (114-228)/() 183/74     Weight: 53 kg (116 lb 13.5 oz) (01/24/18 1254)  Body mass index is 19.44 kg/m².  Body surface area is 1.56 meters squared.    I/O last 3 completed shifts:  In: 820 [P.O.:720; IV Piggyback:100]  Out: 1600 [Urine:1600]    Physical Exam   Constitutional: She is oriented to person, place, and time. She appears well-developed. No distress.   HENT:   Head: Normocephalic and atraumatic.   Eyes: Conjunctivae are normal. Pupils are equal, round, and reactive to light.   Neck: Trachea normal. Neck supple. No JVD present.   Cardiovascular: Normal rate, regular rhythm, S1 normal, S2 normal, intact distal pulses and normal pulses.  Exam reveals no gallop and no friction rub.    No murmur heard.  Pulmonary/Chest: Effort normal and breath sounds normal.   Abdominal: Soft. Bowel sounds are normal. She exhibits no distension. There is no tenderness.   Musculoskeletal: Normal range of motion.   Neurological: She is alert and oriented to person, place, and time.   Skin: Skin is warm and dry. Capillary refill takes less than 2 seconds.   Psychiatric: She has a normal mood and affect. Her behavior is normal.   Vitals reviewed.      Significant Labs:  BMP:     Recent Labs  Lab 01/24/18  0443   GLU 71      CO2 19*   BUN 57*   CREATININE 2.5*   CALCIUM 8.5*   MG 1.6     Cardiac Markers: No results for input(s): CKMB,  TROPONINT, MYOGLOBIN in the last 168 hours.  CBC:     Recent Labs  Lab 01/24/18  0443   WBC 2.92*   RBC 3.49*   HGB 8.7*   HCT 28.4*      MCV 81*   MCH 24.9*   MCHC 30.6*     CMP:     Recent Labs  Lab 01/24/18  0443   GLU 71   CALCIUM 8.5*   ALBUMIN 2.4*   PROT 6.1      K 4.2   CO2 19*      BUN 57*   CREATININE 2.5*   ALKPHOS 77   ALT 16   AST 36   BILITOT 0.2     All labs within the past 24 hours have been reviewed.     Significant Imaging:  Labs: Reviewed

## 2018-01-25 NOTE — ASSESSMENT & PLAN NOTE
· BP not well control, will continue to monitor. Goal for BP is <130 mmHg SBP and BDP <80 mmHg.   · She declines clonidine and Nifedipeine  · labetalol to 400 mg BID  · Cont Hydralrazine to 50 mg TID  · Cont Irbesartan 300 mg  · decrease Torsemide 20 mg daily  · Start Spironolactone 12.5 mg BID as per Endocrinology

## 2018-01-25 NOTE — ASSESSMENT & PLAN NOTE
CKD stage 4 followed by Dr. Ruffin in nephrology clinic   - will need follow up with nephrology upon dc  - sCr is stable

## 2018-01-25 NOTE — TELEPHONE ENCOUNTER
----- Message from Gustabo Sin MD sent at 1/25/2018  2:22 PM CST -----  Please schedule endocrine follow-up for hyperaldosteronism. Can be seen by myself or another endocrinologist.

## 2018-01-25 NOTE — PROGRESS NOTES
[facility]  Nephrology  Progress Note    Patient Name: Ewelina Arnold  MRN: [id number]  Admission Date: 1/14/2018  Hospital Length of Stay: 11 days  Attending Provider: Magdy Bella MD   Primary Care Physician: Kalie Walton MD  Principal Problem:Hypertensive emergency    Subjective:     HPI: Ms Ewelina Arnold is a pleasant 72 y.o. AA Female with a PMHx relevant for HTN, HLD, CKD satge 3 with solitary kidney due to kidney donation 1985, SLE CAD s/p PCI to LAD 2011, moderate AS, SLE on plaquenil, hx of partial colectomy for polyps and colon cancer (in remission), GERD, and hypothyroidism who admitted to [facility] with malignant HTN with complaint of dizziness and Headaches with nausea. She was found to have a SBP >230 and she was placed on a cardene drip. She presented with a sCr of 2.0 she has a baseline sCr of 1.8 - 2.0. Upon admission and improvement of her BP her sCr started to rise peaking at 2.4. Today her sCr is back to 2.0. She still is having difficulty in controlling her BP this plus MARYAM was the reason for Nephrology consult. She was prescribed clonidine by Cardiology but when she took the first dose of clonidine 1/10/18.  She began having dizziness, vertigo, headache and nausea for 2 days. In addition she c/o Headaches described as constant located in her occipital region. She is declining clonidine. She has a hx of having been taken off amlodipine due to ankle swelling.    Interval History:   NAEON, BP better overnight,    UO not accurate 600 ml plus 2x unmeasured. sCr stable 2.6 this am. Comfortable on RA.    Review of patient's allergies indicates:   Allergen Reactions    Ace inhibitors      Other reaction(s): Lips Swelling    Vioxx [rofecoxib]      Other reaction(s): lips swelling    Bactrim [sulfamethoxazole-trimethoprim]      Pt would like this medication to be added due to elevation of creatinine with single kidney.    Procardia [nifedipine] Nausea Only and Edema      Feet swelling and nausea, now reports that this has happened in the past and required lasix    Arthrotec 50 [diclofenac-misoprostol]      Other reaction(s): Unknown    Bentyl [dicyclomine]      Not effective    Doxycycline      nausea    Imdur [isosorbide mononitrate] Nausea Only    Lomotil [diphenoxylate-atropine]      Stomach cramps, pt vomitted    Lozol [indapamide] Rash    Tramadol Nausea Only     Current Facility-Administered Medications   Medication Frequency    acetaminophen tablet 650 mg Q6H PRN    aspirin EC tablet 81 mg Daily    butalbital-acetaminophen-caffeine -40 mg per tablet 1 tablet Q4H PRN    cholestyramine 4 gram packet 4 g TID WM    citalopram tablet 20 mg Daily    cloNIDine tablet 0.2 mg QHS    dextrose 50% injection 12.5 g PRN    dextrose 50% injection 25 g PRN    glucagon (human recombinant) injection 1 mg PRN    glucose chewable tablet 16 g PRN    glucose chewable tablet 24 g PRN    hydrALAZINE tablet 50 mg Q8H    hydroxychloroquine tablet 200 mg BID    irbesartan tablet 300 mg Daily    labetalol injection 10 mg Q6H PRN    labetalol injection 10 mg Once    labetalol injection 10 mg Q4H PRN    labetalol tablet 400 mg Q12H    latanoprost 0.005 % ophthalmic solution 1 drop Daily    levothyroxine tablet 75 mcg Before breakfast    nystatin-triamcinolone cream QID PRN    ondansetron disintegrating tablet 8 mg Q8H PRN    pantoprazole EC tablet 40 mg Daily    promethazine tablet 25 mg Q6H PRN    rosuvastatin tablet 40 mg QHS    sodium chloride 0.9% flush 3 mL PRN    spironolactone split tablet 12.5 mg BID    torsemide tablet 20 mg BID       Objective:     Vital Signs (Most Recent):  Temp: 99 °F (37.2 °C) (01/25/18 1557)  Pulse: 61 (01/25/18 1557)  Resp: 18 (01/25/18 1557)  BP: (!) 174/84 (01/25/18 1557)  SpO2: 99 % (01/25/18 1557)  O2 Device (Oxygen Therapy): room air (01/25/18 1557) Vital Signs (24h Range):  Temp:  [97.7 °F (36.5 °C)-99 °F (37.2 °C)] 99 °F  (37.2 °C)  Pulse:  [59-71] 61  Resp:  [18-20] 18  SpO2:  [98 %-100 %] 99 %  BP: (114-186)/(57-84) 174/84     Weight: 53 kg (116 lb 13.5 oz) (01/24/18 1254)  Body mass index is 19.44 kg/m².  Body surface area is 1.56 meters squared.    I/O last 3 completed shifts:  In: 100 [IV Piggyback:100]  Out: 1200 [Urine:1200]    Physical Exam   Constitutional: She is oriented to person, place, and time. She appears well-developed. No distress.   HENT:   Head: Normocephalic and atraumatic.   Eyes: Conjunctivae are normal. Pupils are equal, round, and reactive to light.   Neck: Trachea normal. Neck supple. No JVD present.   Cardiovascular: Normal rate, regular rhythm, S1 normal, S2 normal, intact distal pulses and normal pulses.  Exam reveals no gallop and no friction rub.    No murmur heard.  Pulmonary/Chest: Effort normal and breath sounds normal.   Abdominal: Soft. Bowel sounds are normal. She exhibits no distension. There is no tenderness.   Musculoskeletal: Normal range of motion.   Neurological: She is alert and oriented to person, place, and time.   Skin: Skin is warm and dry. Capillary refill takes less than 2 seconds.   Psychiatric: She has a normal mood and affect. Her behavior is normal.   Vitals reviewed.      Significant Labs:  BMP:     Recent Labs  Lab 01/25/18  0659   GLU 79      CO2 21*   BUN 60*   CREATININE 2.6*   CALCIUM 8.3*   MG 1.5*     Cardiac Markers: No results for input(s): CKMB, TROPONINT, MYOGLOBIN in the last 168 hours.  CBC:     Recent Labs  Lab 01/25/18  0659   WBC 3.36*   RBC 3.41*   HGB 8.8*   HCT 27.5*      MCV 81*   MCH 25.8*   MCHC 32.0     CMP:     Recent Labs  Lab 01/25/18  0659   GLU 79   CALCIUM 8.3*   ALBUMIN 2.4*   PROT 6.2   *   K 4.2   CO2 21*      BUN 60*   CREATININE 2.6*   ALKPHOS 81   ALT 22   AST 44*   BILITOT 0.2     All labs within the past 24 hours have been reviewed.     Significant Imaging:  Labs: Reviewed    Assessment/Plan:     MARYAM on CKD 4    MARYAM on  CKD 4 suspect iATN from quick correction of her BP on Nicardepene.     · sCr stable 2.6 today  · UPCr elevated 1.22 up from what had been ~ 0.6 but Complements stable awaiting DS-DNA AB  · Reanal US/Doppler per vascular lab show mild FRAN per cardiology  · Aldosterone 31.6, Renin level 0.1and ratio 205 suggesting primary aldosterone state  · plasma [email or web address] process r/o secondary causes but doubt there is one  · continue to monitor strict I/O's, daily weights, renally dose medications, and avoid nephrotoxic agents                Other secondary hypertension    · BP not well control, will continue to monitor. Goal for BP is <130 mmHg SBP and BDP <80 mmHg.   · She declines clonidine and Nifedipeine  · labetalol to 400 mg BID  · Cont Hydralrazine to 50 mg TID  · Cont Irbesartan 300 mg  · decrease Torsemide 20 mg daily  · Start Spironolactone 12.5 mg BID as per Endocrinology              Thank you for your consult. I will follow-up with patient. Please contact us if you have any additional questions.    Harsh Benitez MD  Nephrology  [facility]

## 2018-01-25 NOTE — PROGRESS NOTES
[facility]  Nephrology  Progress Note    Patient Name: Ewelina Arnold  MRN: [id number]  Admission Date: 1/14/2018  Hospital Length of Stay: 10 days  Attending Provider: Magdy Bella MD   Primary Care Physician: Kalie Walton MD  Principal Problem:Hypertensive emergency    Subjective:     HPI: Ms Ewelina Arnold is a pleasant 72 y.o. AA Female with a PMHx relevant for HTN, HLD, CKD satge 3 with solitary kidney due to kidney donation 1985, SLE CAD s/p PCI to LAD 2011, moderate AS, SLE on plaquenil, hx of partial colectomy for polyps and colon cancer (in remission), GERD, and hypothyroidism who admitted to [facility] with malignant HTN with complaint of dizziness and Headaches with nausea. She was found to have a SBP >230 and she was placed on a cardene drip. She presented with a sCr of 2.0 she has a baseline sCr of 1.8 - 2.0. Upon admission and improvement of her BP her sCr started to rise peaking at 2.4. Today her sCr is back to 2.0. She still is having difficulty in controlling her BP this plus MARYAM was the reason for Nephrology consult. She was prescribed clonidine by Cardiology but when she took the first dose of clonidine 1/10/18.  She began having dizziness, vertigo, headache and nausea for 2 days. In addition she c/o Headaches described as constant located in her occipital region. She is declining clonidine. She has a hx of having been taken off amlodipine due to ankle swelling.    Interval History:   BP better overnight with clonidine, but high though out the day.    UO 1600 ml/24hrs. sCr stable 2.5 this am. Comfortable on RA.    Review of patient's allergies indicates:   Allergen Reactions    Ace inhibitors      Other reaction(s): Lips Swelling    Vioxx [rofecoxib]      Other reaction(s): lips swelling    Bactrim [sulfamethoxazole-trimethoprim]      Pt would like this medication to be added due to elevation of creatinine with single kidney.    Procardia [nifedipine] Nausea Only and  Edema     Feet swelling and nausea, now reports that this has happened in the past and required lasix    Arthrotec 50 [diclofenac-misoprostol]      Other reaction(s): Unknown    Bentyl [dicyclomine]      Not effective    Doxycycline      nausea    Imdur [isosorbide mononitrate] Nausea Only    Lomotil [diphenoxylate-atropine]      Stomach cramps, pt vomitted    Lozol [indapamide] Rash    Tramadol Nausea Only     Current Facility-Administered Medications   Medication Frequency    acetaminophen tablet 650 mg Q6H PRN    aspirin EC tablet 81 mg Daily    butalbital-acetaminophen-caffeine -40 mg per tablet 1 tablet Q4H PRN    cholestyramine 4 gram packet 4 g TID WM    citalopram tablet 20 mg Daily    cloNIDine tablet 0.2 mg QHS    dextrose 50% injection 12.5 g PRN    dextrose 50% injection 25 g PRN    glucagon (human recombinant) injection 1 mg PRN    glucose chewable tablet 16 g PRN    glucose chewable tablet 24 g PRN    hydrALAZINE tablet 50 mg Q8H    hydroxychloroquine tablet 200 mg BID    [START ON 1/25/2018] irbesartan tablet 300 mg Daily    labetalol injection 10 mg Q6H PRN    labetalol injection 10 mg Once    labetalol injection 10 mg Q4H PRN    labetalol tablet 400 mg Q12H    latanoprost 0.005 % ophthalmic solution 1 drop Daily    levothyroxine tablet 75 mcg Before breakfast    nystatin-triamcinolone cream QID PRN    ondansetron disintegrating tablet 8 mg Q8H PRN    pantoprazole EC tablet 40 mg Daily    promethazine tablet 25 mg Q6H PRN    rosuvastatin tablet 40 mg QHS    sodium chloride 0.9% flush 3 mL PRN    torsemide tablet 20 mg BID       Objective:     Vital Signs (Most Recent):  Temp: 98.3 °F (36.8 °C) (01/24/18 1731)  Pulse: 69 (01/24/18 1731)  Resp: 20 (01/24/18 1731)  BP: (!) 183/74 (01/24/18 1731)  SpO2: 98 % (01/24/18 1731)  O2 Device (Oxygen Therapy): room air (01/24/18 1731) Vital Signs (24h Range):  Temp:  [97.8 °F (36.6 °C)-98.5 °F (36.9 °C)] 98.3 °F (36.8  °C)  Pulse:  [60-69] 69  Resp:  [18-20] 20  SpO2:  [94 %-98 %] 98 %  BP: (114-228)/() 183/74     Weight: 53 kg (116 lb 13.5 oz) (01/24/18 1254)  Body mass index is 19.44 kg/m².  Body surface area is 1.56 meters squared.    I/O last 3 completed shifts:  In: 820 [P.O.:720; IV Piggyback:100]  Out: 1600 [Urine:1600]    Physical Exam   Constitutional: She is oriented to person, place, and time. She appears well-developed. No distress.   HENT:   Head: Normocephalic and atraumatic.   Eyes: Conjunctivae are normal. Pupils are equal, round, and reactive to light.   Neck: Trachea normal. Neck supple. No JVD present.   Cardiovascular: Normal rate, regular rhythm, S1 normal, S2 normal, intact distal pulses and normal pulses.  Exam reveals no gallop and no friction rub.    No murmur heard.  Pulmonary/Chest: Effort normal and breath sounds normal.   Abdominal: Soft. Bowel sounds are normal. She exhibits no distension. There is no tenderness.   Musculoskeletal: Normal range of motion.   Neurological: She is alert and oriented to person, place, and time.   Skin: Skin is warm and dry. Capillary refill takes less than 2 seconds.   Psychiatric: She has a normal mood and affect. Her behavior is normal.   Vitals reviewed.      Significant Labs:  BMP:     Recent Labs  Lab 01/24/18  0443   GLU 71      CO2 19*   BUN 57*   CREATININE 2.5*   CALCIUM 8.5*   MG 1.6     Cardiac Markers: No results for input(s): CKMB, TROPONINT, MYOGLOBIN in the last 168 hours.  CBC:     Recent Labs  Lab 01/24/18  0443   WBC 2.92*   RBC 3.49*   HGB 8.7*   HCT 28.4*      MCV 81*   MCH 24.9*   MCHC 30.6*     CMP:     Recent Labs  Lab 01/24/18  0443   GLU 71   CALCIUM 8.5*   ALBUMIN 2.4*   PROT 6.1      K 4.2   CO2 19*      BUN 57*   CREATININE 2.5*   ALKPHOS 77   ALT 16   AST 36   BILITOT 0.2     All labs within the past 24 hours have been reviewed.     Significant Imaging:  Labs: Reviewed    Assessment/Plan:     MARYAM on CKD 4    MARYAM  on CKD 4 suspect iATN from quick correction of her BP on Nicardepene.     · sCr increased over the weekend but BP was high over the weekend, sCr stable 2.5 today  · UPCr elevated 1.22 up from what had been ~ 0.6   · Reanal US/Doppler suspicious for FRAN, will be repeated today in vascular lab  · Electrolytes not compatible with FRAN   · Aldosterone 31.6, Renin level 0.1and ratio 205 suggesting primary aldosterone like state  · plasma [email or web address].in process r/o secondary causes but doubt there is one  · For thoroughness we are r/o secondary couses, plan for vascular lab to evaluate renal artery today  · continue to monitor strict I/O's, daily weights, renally dose medications, and avoid nephrotoxic agents                Chronic kidney disease, stage IV (severe)    CKD stage 4 followed by Dr. Ruffin in nephrology clinic   - will need follow up with nephrology upon dc  - sCr is stable        Hypertension    · BP not well control, will continue to monitor. Goal for BP is <130 mmHg SBP and BDP <80 mmHg.   · She declines clonidine and Nifedipeine  · Increase labetalol to 400 mg BID  · Cont Hydral;azine to 150 mg TID  · Cont Irbesartan 300 mg  · Cont Torsemide 20 mg BID  · Will look for secondary hyperaldosteronism in setting of suspected FRAN, pending labs          Lupus (systemic lupus erythematosus)    - UA with no evidence of blood   - UPCR has in creassed from ~0.6 to 1.22  - UA on 1/14/2018 with 2+ protein and positive for occult blood  - She has a hx of singe kidney biopsy is high risk  - Cont Plaquenil  - Will check complement levels and a DS-DNA AB            Thank you for your consult. I will follow-up with patient. Please contact us if you have any additional questions.    Harsh Benitez MD  Nephrology  [facility]

## 2018-01-26 VITALS
RESPIRATION RATE: 16 BRPM | HEIGHT: 65 IN | BODY MASS INDEX: 19.47 KG/M2 | OXYGEN SATURATION: 98 % | DIASTOLIC BLOOD PRESSURE: 72 MMHG | WEIGHT: 116.88 LBS | SYSTOLIC BLOOD PRESSURE: 163 MMHG | TEMPERATURE: 98 F | HEART RATE: 60 BPM

## 2018-01-26 DIAGNOSIS — E03.9 HYPOTHYROIDISM, UNSPECIFIED TYPE: ICD-10-CM

## 2018-01-26 LAB
ALBUMIN SERPL BCP-MCNC: 2.7 G/DL
ALP SERPL-CCNC: 81 U/L
ALT SERPL W/O P-5'-P-CCNC: 21 U/L
ANION GAP SERPL CALC-SCNC: 9 MMOL/L
AST SERPL-CCNC: 38 U/L
BASOPHILS # BLD AUTO: 0.03 K/UL
BASOPHILS NFR BLD: 0.8 %
BILIRUB SERPL-MCNC: 0.2 MG/DL
BUN SERPL-MCNC: 60 MG/DL
CALCIUM SERPL-MCNC: 8.6 MG/DL
CHLORIDE SERPL-SCNC: 105 MMOL/L
CO2 SERPL-SCNC: 20 MMOL/L
CREAT SERPL-MCNC: 2.6 MG/DL
DIFFERENTIAL METHOD: ABNORMAL
EOSINOPHIL # BLD AUTO: 0.2 K/UL
EOSINOPHIL NFR BLD: 6.1 %
ERYTHROCYTE [DISTWIDTH] IN BLOOD BY AUTOMATED COUNT: 13.7 %
EST. GFR  (AFRICAN AMERICAN): 20.5 ML/MIN/1.73 M^2
EST. GFR  (NON AFRICAN AMERICAN): 17.8 ML/MIN/1.73 M^2
GLUCOSE SERPL-MCNC: 78 MG/DL
HCT VFR BLD AUTO: 28 %
HGB BLD-MCNC: 8.8 G/DL
IMM GRANULOCYTES # BLD AUTO: 0.01 K/UL
IMM GRANULOCYTES NFR BLD AUTO: 0.3 %
LYMPHOCYTES # BLD AUTO: 1.1 K/UL
LYMPHOCYTES NFR BLD: 30.2 %
MAGNESIUM SERPL-MCNC: 2.2 MG/DL
MCH RBC QN AUTO: 25.1 PG
MCHC RBC AUTO-ENTMCNC: 31.4 G/DL
MCV RBC AUTO: 80 FL
MONOCYTES # BLD AUTO: 0.7 K/UL
MONOCYTES NFR BLD: 18.8 %
NEUTROPHILS # BLD AUTO: 1.6 K/UL
NEUTROPHILS NFR BLD: 43.8 %
NRBC BLD-RTO: 0 /100 WBC
PHOSPHATE SERPL-MCNC: 4.6 MG/DL
PLATELET # BLD AUTO: 173 K/UL
PMV BLD AUTO: 11 FL
POTASSIUM SERPL-SCNC: 4.4 MMOL/L
PROT SERPL-MCNC: 6.6 G/DL
RBC # BLD AUTO: 3.5 M/UL
SODIUM SERPL-SCNC: 134 MMOL/L
WBC # BLD AUTO: 3.61 K/UL

## 2018-01-26 PROCEDURE — 99239 HOSP IP/OBS DSCHRG MGMT >30: CPT | Mod: ,,, | Performed by: HOSPITALIST

## 2018-01-26 PROCEDURE — 25000003 PHARM REV CODE 250: Performed by: INTERNAL MEDICINE

## 2018-01-26 PROCEDURE — 25000003 PHARM REV CODE 250: Performed by: STUDENT IN AN ORGANIZED HEALTH CARE EDUCATION/TRAINING PROGRAM

## 2018-01-26 PROCEDURE — 85025 COMPLETE CBC W/AUTO DIFF WBC: CPT

## 2018-01-26 PROCEDURE — 83735 ASSAY OF MAGNESIUM: CPT

## 2018-01-26 PROCEDURE — 80053 COMPREHEN METABOLIC PANEL: CPT

## 2018-01-26 PROCEDURE — 84244 ASSAY OF RENIN: CPT

## 2018-01-26 PROCEDURE — 25000003 PHARM REV CODE 250: Performed by: HOSPITALIST

## 2018-01-26 PROCEDURE — 84100 ASSAY OF PHOSPHORUS: CPT

## 2018-01-26 PROCEDURE — 36415 COLL VENOUS BLD VENIPUNCTURE: CPT

## 2018-01-26 PROCEDURE — 82088 ASSAY OF ALDOSTERONE: CPT

## 2018-01-26 RX ORDER — TORSEMIDE 20 MG/1
20 TABLET ORAL DAILY
Status: DISCONTINUED | OUTPATIENT
Start: 2018-01-27 | End: 2018-01-26 | Stop reason: HOSPADM

## 2018-01-26 RX ORDER — SPIRONOLACTONE 25 MG/1
12.5 TABLET ORAL 2 TIMES DAILY
Qty: 30 TABLET | Refills: 11 | Status: SHIPPED | OUTPATIENT
Start: 2018-01-26 | End: 2018-02-09 | Stop reason: SINTOL

## 2018-01-26 RX ADMIN — HYDROXYCHLOROQUINE SULFATE 200 MG: 200 TABLET, FILM COATED ORAL at 08:01

## 2018-01-26 RX ADMIN — ACETAMINOPHEN 650 MG: 325 TABLET ORAL at 08:01

## 2018-01-26 RX ADMIN — ASPIRIN 81 MG: 81 TABLET, COATED ORAL at 08:01

## 2018-01-26 RX ADMIN — LABETALOL HCL 400 MG: 200 TABLET, FILM COATED ORAL at 08:01

## 2018-01-26 RX ADMIN — TORSEMIDE 20 MG: 20 TABLET ORAL at 08:01

## 2018-01-26 RX ADMIN — SPIRONOLACTONE 12.5 MG: 25 TABLET, FILM COATED ORAL at 08:01

## 2018-01-26 RX ADMIN — LEVOTHYROXINE SODIUM 75 MCG: 75 TABLET ORAL at 05:01

## 2018-01-26 RX ADMIN — LATANOPROST 1 DROP: 50 SOLUTION OPHTHALMIC at 08:01

## 2018-01-26 RX ADMIN — CITALOPRAM HYDROBROMIDE 20 MG: 20 TABLET ORAL at 08:01

## 2018-01-26 RX ADMIN — IRBESARTAN 300 MG: 300 TABLET ORAL at 08:01

## 2018-01-26 RX ADMIN — HYDRALAZINE HYDROCHLORIDE 50 MG: 50 TABLET ORAL at 05:01

## 2018-01-26 RX ADMIN — LABETALOL HYDROCHLORIDE 10 MG: 5 INJECTION INTRAVENOUS at 06:01

## 2018-01-26 RX ADMIN — CHOLESTYRAMINE 4 G: 4 POWDER, FOR SUSPENSION ORAL at 08:01

## 2018-01-26 RX ADMIN — PANTOPRAZOLE SODIUM 40 MG: 40 TABLET, DELAYED RELEASE ORAL at 08:01

## 2018-01-26 NOTE — PLAN OF CARE
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]  Yes    Impaired glucose tolerance [R73.02]  Yes    MARYAM on CKD 4 [N17.9]  Yes    Aortic stenosis [I35.0]  Yes    Cor pulmonale, chronic [I27.81]  Yes    Chronic kidney disease, stage IV (severe) [N18.4]  Yes    Anemia associated with chronic renal failure [D63.1]  Yes    CKD (chronic kidney disease) [N18.9]  Yes    Hyperlipidemia [E78.5]  Yes    Postsurgical hypothyroidism [E89.0]  Yes    CAD (coronary artery disease) [I25.10]  Yes     Chronic    Other secondary hypertension [I15.8]  Yes    Lupus (systemic lupus erythematosus) [M32.9]  Yes     Chronic     Early 1990's developed acute respiratory failure and spent weeks in ICU [facility]   Positive CARYN, SSB  Hx Rash, leukopenia,         Resolved Hospital Problems    Diagnosis Date Resolved POA    *Hypertensive emergency [I16.1] 01/16/2018 Yes    Vertigo [R42] 01/16/2018 Yes    Dizzy [R42] 01/16/2018 Yes       Future Appointments  Date Time Provider Department Center   1/29/2018 10:40 AM Kalie Walton MD [facility]   2/2/2018 10:40 AM Alie Shearer MD [facility] DERM [place]   2/19/2018 8:00 AM LAB, [facility] LAB [facility]   2/26/2018 11:20 AM Beatriz Hartmann NP [facility] CARDIO [facility] [facility]   2/27/2018 1:00 PM Gustabo Sin MD [facility] ENDOCRN [place]     Follow-up Information     Kalie Walton MD On 1/29/2018.    Specialty:  Family Medicine  Why:  appointment 10:40  Contact information:  [address] [facility]  [address]  [facility] [id number] [phone]             Maciel Ruffin MD.     Specialty:  Nephrology  Contact information:  [address]  [place] [address]  [phone]             LAB, APPOINTMENT [facility] On 1/26/2018.    Why:  lab appointment 08:00                   I have seen and examined this patient face to face today. My clinical findings that support the need for the home health skilled services and home bound status are the following:  Weakness/numbness causing balance and gait disturbance due to Weakness/Debility making it taxing to leave home.    Allergies:  Review of patient's allergies indicates:   Allergen Reactions    Ace inhibitors      Other reaction(s): Lips Swelling    Vioxx [rofecoxib]      Other reaction(s): lips swelling    Bactrim [sulfamethoxazole-trimethoprim]      Pt would like this medication to be added due to elevation of creatinine with single kidney.    Procardia [nifedipine] Nausea Only and Edema     Feet swelling and nausea, now reports that this has happened in the past and required lasix    Arthrotec 50 [diclofenac-misoprostol]      Other reaction(s): Unknown    Bentyl [dicyclomine]      Not effective    Doxycycline      nausea    Imdur [isosorbide mononitrate] Nausea Only    Lomotil [diphenoxylate-atropine]      Stomach cramps, pt vomitted    Lozol [indapamide] Rash    Tramadol Nausea Only       Diet: 2 gram sodium diet    Activities: activity as tolerated    Nursing:   SN to complete comprehensive assessment including routine vital signs. Instruct on disease process and s/s of complications to report to MD. Review/verify medication list sent home with the patient at time of discharge  and instruct patient/caregiver as needed. Frequency may be adjusted depending on start of care date.    Notify MD if SBP > 160 or < 90; DBP > 90 or < 50; HR > 120 or < 50; Temp > 101; Other:               Medications: Review discharge medications with patient and family and provide education.      Current Discharge Medication List      START taking  these medications    Details   labetalol (NORMODYNE) 200 MG tablet Take 2 tablets (400 mg total) by mouth every 12 (twelve) hours.  Qty: 120 tablet, Refills: 11      spironolactone (ALDACTONE) 25 MG tablet Take 0.5 tablets (12.5 mg total) by mouth 2 (two) times daily.  Qty: 30 tablet, Refills: 11         CONTINUE these medications which have CHANGED    Details   cloNIDine (CATAPRES) 0.2 MG tablet Take 1 tablet (0.2 mg total) by mouth every evening.  Qty: 30 tablet, Refills: 11      hydrALAZINE (APRESOLINE) 50 MG tablet Take 1 tablet (50 mg total) by mouth every 8 (eight) hours.  Qty: 90 tablet, Refills: 11      irbesartan (AVAPRO) 300 MG tablet Take 1 tablet (300 mg total) by mouth once daily. please take at 9AM  Qty: 90 tablet, Refills: 3      torsemide (DEMADEX) 20 MG Tab Take 1 tablet (20 mg total) by mouth 2 (two) times daily.  Qty: 60 tablet, Refills: 11         CONTINUE these medications which have NOT CHANGED    Details   butalbital-acetaminophen-caffeine -40 mg (FIORICET, ESGIC) -40 mg per tablet Take 1 tablet by mouth every 4 (four) hours as needed for Pain.  Qty: 90 tablet, Refills: 0    Associated Diagnoses: Chronic nonintractable headache, unspecified headache type      citalopram (CELEXA) 20 MG tablet TAKE 1 TABLET (20 MG TOTAL) BY MOUTH ONCE DAILY.  Qty: 90 tablet, Refills: 1      hydroxychloroquine (PLAQUENIL) 200 mg tablet TAKE 1 TABLET TWICE DAILY  Qty: 180 tablet, Refills: 1    Associated Diagnoses: Lupus (systemic lupus erythematosus)      levothyroxine (SYNTHROID) 75 MCG tablet Take 1 tablet (75 mcg total) by mouth before breakfast.  Qty: 90 tablet, Refills: 0    Associated Diagnoses: Hypothyroidism, unspecified type      rosuvastatin (CRESTOR) 40 MG Tab TAKE 1 TABLET EVERY EVENING  Qty: 90 tablet, Refills: 3      acetaminophen (TYLENOL) 500 MG tablet Take 500 mg by mouth every 6 (six) hours as needed for Pain.      ammonium lactate 12 % Crea Apply 1 application topically 2 (two)  times daily as needed.  Qty: 385 g, Refills: 2    Associated Diagnoses: Dermatitis      aspirin (ECOTRIN) 81 MG EC tablet Take 1 tablet (81 mg total) by mouth once daily.  Refills: 0      cholestyramine (QUESTRAN) 4 gram packet Take 1 packet (4 g total) by mouth 3 (three) times daily with meals.  Qty: 90 packet, Refills: 4      conjugated estrogens (PREMARIN) vaginal cream Use 1g nightly for two weeks, then 1g twice per week.  Qty: 30 g, Refills: 12    Associated Diagnoses: Vaginal atrophy      fexofenadine (ALLEGRA) 180 MG tablet Take 1 tablet (180 mg total) by mouth once daily.  Qty: 30 tablet, Refills: 2    Associated Diagnoses: Dizziness      hyoscyamine (LEVSIN/SL) 0.125 mg Subl Place 1 tablet (0.125 mg total) under the tongue 2 (two) times daily as needed.  Qty: 60 tablet, Refills: 1    Associated Diagnoses: Abdominal cramps      ipratropium (ATROVENT) 0.03 % nasal spray 2 sprays by Nasal route 2 (two) times daily as needed for Rhinitis.  Qty: 30 mL, Refills: 0    Associated Diagnoses: Allergic rhinitis with postnasal drip      latanoprost 0.005 % ophthalmic solution INSTILL 1 DROP INTO BOTH EYES EVERY DAY  Qty: 3 Bottle, Refills: 3      meclizine (ANTIVERT) 12.5 mg tablet Take 1 tablet (12.5 mg total) by mouth 3 (three) times daily as needed.  Qty: 90 tablet, Refills: 1    Associated Diagnoses: Dizziness      multivitamin capsule Take 1 capsule by mouth once daily.      nitroGLYCERIN (NITROSTAT) 0.4 MG SL tablet Place 1 tablet (0.4 mg total) under the tongue every 5 (five) minutes as needed for Chest pain.  Qty: 30 tablet, Refills: 3      omeprazole (PRILOSEC) 40 MG capsule Take 1 capsule (40 mg total) by mouth once daily.  Qty: 90 capsule, Refills: 3      TENS units Sravanthi 1 application by Misc.(Non-Drug; Combo Route) route daily as needed (pain).  Qty: 1 Device, Refills: 0    Associated Diagnoses: Osteoarthritis of cervical spine      triamcinolone acetonide 0.1% (KENALOG) 0.1 % paste PLACE ONTO TEETH TWICE  DAILY  Qty: 5 g, Refills: 0      VITAMIN D2 50,000 unit capsule TAKE 1 CAPSULE (50,000 UNITS TOTAL) EVERY 30 DAYS  Qty: 3 capsule, Refills: 0    Associated Diagnoses: Vitamin D deficiency         STOP taking these medications       carvedilol (COREG) 12.5 MG tablet Comments:   Reason for Stopping:         betamethasone dipropionate (DIPROLENE) 0.05 % cream Comments:   Reason for Stopping:         ENSURE ACTIVE CLEAR Liqd Comments:   Reason for Stopping:         nystatin (MYCOSTATIN) ointment Comments:   Reason for Stopping:         nystatin-triamcinolone (MYCOLOG II) cream Comments:   Reason for Stopping:               I certify that this patient is confined to her home and needs intermittent skilled nursing care.

## 2018-01-26 NOTE — NURSING
Discharge instructions discussed with patient and verbalized understanding. Discharge to home. Waiting for transport.

## 2018-01-28 ENCOUNTER — NURSE TRIAGE (OUTPATIENT)
Dept: ADMINISTRATIVE | Facility: CLINIC | Age: 73
End: 2018-01-28

## 2018-01-28 NOTE — TELEPHONE ENCOUNTER
"Patient stated that her blood pressure was 117/57 at 1100 and 115/56 at 1520 but she is asymptomatic. Advised per protocol and she verbalized understanding. Has an appointment with PCP tomorrow. Instructed to call back with any additional problems and/or concerns    Reason for Disposition   [1] Systolic BP  AND [2] taking blood pressure medications AND [3] NOT dizzy, lightheaded or weak    Answer Assessment - Initial Assessment Questions  1. BLOOD PRESSURE: "What is the blood pressure?" "Did you take at least two measurements 5 minutes apart?"      115/56 at 1520 and 1100 117/57  2. ONSET: "When did you take your blood pressure?"      See above  3. HOW: "How did you obtain the blood pressure?" (e.g., visiting nurse, automatic home BP monitor)      Automatic blood pressure  4. HISTORY: "Do you have a history of low blood pressure?" "What is your blood pressure normally?"      No. Not sure of baseline  5. MEDICATIONS: "Are you taking any medications for blood pressure?" If yes: "Have they been changed recently?"      Yes with recent hospitalization  6. PULSE RATE: "Do you know what your pulse rate is?"       69 at 1520  7. OTHER SYMPTOMS: "Have you been sick recently?" "Have you had a recent injury?"      yes  8. PREGNANCY: "Is there any chance you are pregnant?" "When was your last menstrual period?"      No    Protocols used: ST LOW BLOOD PRESSURE-A-AH      "

## 2018-01-29 ENCOUNTER — OFFICE VISIT (OUTPATIENT)
Dept: FAMILY MEDICINE | Facility: CLINIC | Age: 73
End: 2018-01-29
Payer: MEDICARE

## 2018-01-29 ENCOUNTER — PATIENT OUTREACH (OUTPATIENT)
Dept: ADMINISTRATIVE | Facility: CLINIC | Age: 73
End: 2018-01-29

## 2018-01-29 VITALS
TEMPERATURE: 98 F | DIASTOLIC BLOOD PRESSURE: 70 MMHG | BODY MASS INDEX: 19.21 KG/M2 | HEART RATE: 64 BPM | SYSTOLIC BLOOD PRESSURE: 160 MMHG | WEIGHT: 115.31 LBS | HEIGHT: 65 IN

## 2018-01-29 DIAGNOSIS — R42 DIZZINESS: ICD-10-CM

## 2018-01-29 DIAGNOSIS — E46 PROTEIN-CALORIE MALNUTRITION, UNSPECIFIED SEVERITY: ICD-10-CM

## 2018-01-29 DIAGNOSIS — I10 HYPERTENSION, UNSPECIFIED TYPE: Primary | ICD-10-CM

## 2018-01-29 DIAGNOSIS — N28.9 RENAL INSUFFICIENCY: ICD-10-CM

## 2018-01-29 DIAGNOSIS — F32.5 MAJOR DEPRESSIVE DISORDER WITH SINGLE EPISODE, IN FULL REMISSION: ICD-10-CM

## 2018-01-29 LAB
ALDOST SERPL-MCNC: 17.3 NG/DL
METANEPH FREE SERPL-MCNC: 59 PG/ML
METANEPHS SERPL-MCNC: 151 PG/ML
NORMETANEPH FREE SERPL-MCNC: 92 PG/ML

## 2018-01-29 PROCEDURE — 99496 TRANSJ CARE MGMT HIGH F2F 7D: CPT | Mod: S$GLB,,, | Performed by: FAMILY MEDICINE

## 2018-01-29 PROCEDURE — 99999 PR PBB SHADOW E&M-EST. PATIENT-LVL III: CPT | Mod: PBBFAC,,, | Performed by: FAMILY MEDICINE

## 2018-01-29 PROCEDURE — 99499 UNLISTED E&M SERVICE: CPT | Mod: S$GLB,,, | Performed by: FAMILY MEDICINE

## 2018-01-29 RX ORDER — MECLIZINE HCL 12.5 MG 12.5 MG/1
12.5 TABLET ORAL 3 TIMES DAILY PRN
Qty: 90 TABLET | Refills: 1 | Status: ON HOLD | OUTPATIENT
Start: 2018-01-29 | End: 2018-03-28 | Stop reason: HOSPADM

## 2018-01-29 RX ORDER — LEVOTHYROXINE SODIUM 75 UG/1
75 TABLET ORAL
Qty: 90 TABLET | Refills: 0 | Status: ON HOLD | OUTPATIENT
Start: 2018-01-29 | End: 2018-03-28

## 2018-01-29 RX ORDER — CITALOPRAM 20 MG/1
30 TABLET, FILM COATED ORAL DAILY
Qty: 135 TABLET | Refills: 1 | Status: SHIPPED | OUTPATIENT
Start: 2018-01-29 | End: 2018-08-27 | Stop reason: SDUPTHER

## 2018-01-29 RX ORDER — CLONIDINE 0.2 MG/24H
1 PATCH, EXTENDED RELEASE TRANSDERMAL
Qty: 4 PATCH | Refills: 11 | Status: SHIPPED | OUTPATIENT
Start: 2018-01-29 | End: 2018-01-31

## 2018-01-29 RX ORDER — ACETAMINOPHEN 500 MG
1000 TABLET ORAL EVERY 6 HOURS PRN
Qty: 60 TABLET | Refills: 0 | COMMUNITY
Start: 2018-01-29 | End: 2018-03-19

## 2018-01-29 RX ORDER — TORSEMIDE 20 MG/1
20 TABLET ORAL DAILY
Qty: 30 TABLET | Refills: 11 | Status: SHIPPED | OUTPATIENT
Start: 2018-01-29 | End: 2018-01-31 | Stop reason: SDUPTHER

## 2018-01-29 NOTE — PROGRESS NOTES
C3 nurse attempted to contact patient. No answer. The following message was left for the patient to return the call:  Good Afternoon,  I am a nurse calling on behalf of [facility] from the [facility].  This is a Transitional Care Call for Ewelina Arnold . When you have a moment please contact us at [phone] or [phone] Monday through Friday, between the hours of 8 am to 4 pm. We look forward to speaking with you. On behalf of [facility] have a nice day.    The patient has a scheduled HOSFU appointment with Kalie Walton MD on 1/29/18 @ 0220. Message sent to Physician staff.

## 2018-01-30 ENCOUNTER — PATIENT MESSAGE (OUTPATIENT)
Dept: ADMINISTRATIVE | Facility: OTHER | Age: 73
End: 2018-01-30

## 2018-01-30 ENCOUNTER — PATIENT MESSAGE (OUTPATIENT)
Dept: FAMILY MEDICINE | Facility: CLINIC | Age: 73
End: 2018-01-30

## 2018-01-30 LAB — RENIN PLAS-CCNC: <0.6 NG/ML/H

## 2018-01-30 RX ORDER — CLONIDINE HYDROCHLORIDE 0.2 MG/1
0.2 TABLET ORAL NIGHTLY
COMMUNITY
End: 2018-01-31 | Stop reason: SDUPTHER

## 2018-01-30 NOTE — PROGRESS NOTES
Subjective:       Patient ID: Ewelina Arnold is a 72 y.o. female.    Chief Complaint: Hospital Follow Up    HPI  Review of Systems    Objective:      Physical Exam    Assessment:       1. Hypertension, unspecified type    2. Dizziness    3. Renal insufficiency        Plan:       ***

## 2018-01-30 NOTE — PROGRESS NOTES
Transitional Care Note  Subjective:       Patient ID: Ewelina Arnold is a 72 y.o. female.  Chief Complaint: Hospital Follow Up    Family and/or Caretaker present at visit?  No.  Diagnostic tests reviewed/disposition: No diagnosic tests pending after this hospitalization.  Disease/illness education: htn, kidney disease  Home health/community services discussion/referrals: Patient does not have home health established from hospital visit.  They do not need home health.  If needed, we will set up home health for the patient.   Establishment or re-establishment of referral orders for community resources: No other necessary community resources.   Discussion with other health care providers: No discussion with other health care providers necessary.   HPI  Review of Systems   Constitutional: Positive for fatigue.   HENT: Negative.    Eyes: Negative.    Respiratory: Negative.    Cardiovascular: Negative.    Gastrointestinal: Negative.    Endocrine: Negative.    Genitourinary: Negative.    Musculoskeletal: Negative.    Skin: Negative.    Allergic/Immunologic: Negative.    Neurological: Negative.    Hematological: Negative.    Psychiatric/Behavioral: Negative.        Objective:      Physical Exam   Constitutional: She is oriented to person, place, and time. She appears well-developed and well-nourished.   HENT:   Head: Normocephalic and atraumatic.   Right Ear: External ear normal.   Left Ear: External ear normal.   Nose: Nose normal.   Mouth/Throat: Oropharynx is clear and moist.   Eyes: Conjunctivae and EOM are normal. Pupils are equal, round, and reactive to light.   Neck: Normal range of motion. Neck supple. No JVD present.   Cardiovascular: Normal rate, regular rhythm, normal heart sounds and intact distal pulses.    Pulmonary/Chest: Effort normal and breath sounds normal. No respiratory distress. She has no wheezes.   Abdominal: Soft. Bowel sounds are normal. She exhibits no distension. There is no tenderness.    Neurological: She is alert and oriented to person, place, and time. She displays normal reflexes. No cranial nerve deficit or sensory deficit. She exhibits normal muscle tone. Coordination normal.   Skin: Skin is warm and dry. No rash noted. No erythema. No pallor.   Psychiatric: She has a normal mood and affect. Her behavior is normal. Judgment and thought content normal.       Assessment:       1. Hypertension, unspecified type    2. Dizziness    3. Renal insufficiency     4.     Clonidine causing fatigue  Plan:         further med card dated 1/29/2018  cloNIDine 0.2 mg/24 hr td ptwk (CATAPRES) 0.2 mg/24 hr 1 patch, Every 7 days        conjugated estrogens (PREMARIN) vaginal cream             Patient taking differently: 180 mg Oral As needed (PRN), Informant: Self, Reported on 11/22/2017      hydrALAZINE (APRESOLINE) 50 MG tablet 50 mg, Every 8 hours       hydroxychloroquine (PLAQUENIL) 200 mg tablet             ipratropium (ATROVENT) 0.03 % nasal spray 2 spray, 2 times daily PRN       irbesartan (AVAPRO) 300 MG tablet 300 mg, Daily       labetalol (NORMODYNE) 200 MG tablet 400 mg, Every 12 hours       latanoprost 0.005 % ophthalmic solution        levothyroxine (SYNTHROID) 75 MCG tablet 75 mcg, Before breakfast       meclizine (ANTIVERT) 12.5 mg tablet 12.5 mg, 3 times daily PRN       multivitamin capsule 1 capsule, Daily       nitroGLYCERIN (NITROSTAT) 0.4 MG SL tablet 0.4 mg, Every 5 min PRN       omeprazole (PRILOSEC) 40 MG capsule 40 mg, Daily       rosuvastatin (CRESTOR) 40 MG Tab        spironolactone (ALDACTONE) 25 MG tablet 12.5 mg, 2 times daily       TENS units Sravanthi 1 application, Daily PRN       torsemide (DEMADEX) 20 MG Tab 20 mg, Daily       triamcinolone acetonide 0.1% (KENALOG) 0.1 % paste        VITAMIN D2 50,000 unit capsule         nepro one 8 ounce can daily as a diet supplement.  Refer to Dr. Curt Epperson nephrology.

## 2018-01-30 NOTE — PATIENT INSTRUCTIONS
Uncontrolled High Blood Pressure (Established)    Your blood pressure was unusually high today. This can occur if youve missed doses of your blood pressure medicine. Or it can happen if you are taking other medicines. These include some asthma inhalers, decongestants, diet pills, and street drugs like cocaine and amphetamine.  Other causes include:  · Weight gain  · More salt in your diet  · Smoking  · Caffeine  Your blood pressure can also rise if you are emotionally upset or in intense pain. It may go back to normal after a period of rest.  A blood pressure reading is made up of 2 numbers. There is a top number over a bottom number. The top number is the systolic pressure. The bottom number is the diastolic pressure. A normal blood pressure is a systolic pressure of less than 120 over a diastolic pressure of less than 80. High blood pressure (hypertension) is when the top number is 140 or higher. Or it is when the bottom number is 90 or higher. You will see your blood pressure readings written together. For example, a person with a systolic pressure of 118 and a diastolic pressure of 78 will have 118/78 written in the medical record. To be high blood pressure, the numbers must be higher when tested over a period of time. The blood pressures between normal and hypertension are called prehypertension. Prehypertension is a warning sign. The information gives you a chance to make lifestyle changes (weight loss, more exercise) that can keep your blood pressure from going higher.  Home care  Its important to take steps to lower your blood pressure. If you are taking blood pressure medicine, the guidelines below may help you need less or no medicines in the future.  · Begin a weight-loss program if you are overweight.  · Cut back on the amount of salt in your diet:  ¨ Avoid high-salt foods like olives, pickles, smoked meats, and salted potato chips.  ¨ Dont add salt to your food at the table.  ¨ Use only small  amounts of salt when cooking.  · Begin an exercise program. Talk with your health care provider about what exercise program is best for you. It doesnt have to be difficult. Even brisk walking for 20 minutes 3 times a week is a good form of exercise.  · Avoid medicines that stimulates the heart. This includes many over-the-counter cold and sinus decongestant pills and sprays, as well as diet pills. Check the warnings about hypertension on the label. Before purchasing any over-the-counter medicines or supplements, always ask the pharmacist about the product's potential interaction with your high blood pressure and your medicines.  · Stimulants such as amphetamine or cocaine could be lethal for someone with hypertension. Never take these.  · Limit how much caffeine you drink. Or switch to noncaffeinated beverages.  · Stop smoking. If you are a long-time smoker, this can be hard. Enroll in a stop-smoking program to make it more likely that you will succeed. Talk with your provider about ways to quit.  · Learn how to handle stress better. This is an important part of any program to lower blood pressure. Learn ways to relax. These include meditation, yoga, and biofeedback.  · If medicines were prescribed, take them exactly as directed. Missing doses may cause your blood pressure to get out of control.  · If you miss a dose or doses of your medicines, check with your healthcare provider or pharmacist about what to do.  · Consider buying an automatic blood pressure machine. Your provider may recommend a certain type. You can get one of these at most pharmacies. Measure your blood pressure twice a day, in the morning, and in the late afternoon. Keep a written record of your home blood pressure readings and take the record to your medical appointments.  Here are some additional guidelines on home blood pressure monitoring from the American [employer] Association.  · Don't smoke or drink coffee for 30 minutes  · Go to the bathroom  before the test.  · Relax for 5 minutes before taking the measurement.  · Sit correctly. Be sure your back is supported. Don't sit on a couch or soft chair. Uncross your feet and place them flat on the floor. Place your arm on a solid, flat surface like a table with the upper arm at heart level. Make certain the middle of the cuff is directly above the eye of the elbow. Check the monitor's instruction manual for an illustration.  · Take multiple readings. When you measure, take 2 or 3 readings one minute apart and record all of the results.  · Take your blood pressure at the same time every day, or as your healthcare provider recommends.  · Record the date, time, and blood pressure reading.  · Take the record with you to your next appointment. If your blood pressure monitor has a built-in memory, simply take the monitor with you to your next appointment.  · Call your provider if you have several high readings. Don't be frightened by a single high reading, but if you get several high readings, check in with your healthcare provider.  · Note: When blood pressure reaches a systolic (top number) of 180 or higher or a diastolic (bottom number) of 110 or higher, emergency medical treatment is required. Call your healthcare provider immediately.  Follow-up care  Regular visits to your own healthcare provider for blood pressure and medicine checks are an important part of your care. Make a follow-up appointment as directed. Bring the record of your home blood pressure readings to the appointment.  When to seek medical advice  Call your healthcare provider right away if any of these occur:  · Blood pressure reaches a systolic (top number) of 180 or higher or diastolic (bottom number) of 110 or higher, emergency medical treatment is required.  · Chest, arm, shoulder, neck, or upper back pain  · Shortness of breath  · Severe headache  · Throbbing or rushing sound in the ears  · Nosebleed  · Extreme drowsiness, confusion, or  fainting  · Dizziness or dizziness with spinning sensation (vertigo)  · Weakness in an arm or leg or on one side of the face  · Trouble speaking or seeing   Date Last Reviewed: 1/1/2017 © 2000-2018 [employer]. [address] [place] [address], [place] [address]. All rights reserved. This information is not intended as a substitute for professional medical care. Always follow your healthcare professional's instructions.

## 2018-01-31 ENCOUNTER — OFFICE VISIT (OUTPATIENT)
Dept: CARDIOLOGY | Facility: CLINIC | Age: 73
End: 2018-01-31
Payer: MEDICARE

## 2018-01-31 ENCOUNTER — PATIENT MESSAGE (OUTPATIENT)
Dept: FAMILY MEDICINE | Facility: CLINIC | Age: 73
End: 2018-01-31

## 2018-01-31 ENCOUNTER — OUTPATIENT CASE MANAGEMENT (OUTPATIENT)
Dept: ADMINISTRATIVE | Facility: OTHER | Age: 73
End: 2018-01-31

## 2018-01-31 VITALS
HEIGHT: 65 IN | HEART RATE: 60 BPM | WEIGHT: 117.31 LBS | DIASTOLIC BLOOD PRESSURE: 73 MMHG | OXYGEN SATURATION: 100 % | SYSTOLIC BLOOD PRESSURE: 174 MMHG | BODY MASS INDEX: 19.54 KG/M2

## 2018-01-31 DIAGNOSIS — I1A.0 RESISTANT HYPERTENSION: Primary | Chronic | ICD-10-CM

## 2018-01-31 DIAGNOSIS — I25.10 CORONARY ARTERY DISEASE DUE TO CALCIFIED CORONARY LESION: Chronic | ICD-10-CM

## 2018-01-31 DIAGNOSIS — I27.20 PULMONARY HYPERTENSION: ICD-10-CM

## 2018-01-31 DIAGNOSIS — I25.84 CORONARY ARTERY DISEASE DUE TO CALCIFIED CORONARY LESION: Chronic | ICD-10-CM

## 2018-01-31 DIAGNOSIS — I50.32 CHRONIC DIASTOLIC HEART FAILURE: ICD-10-CM

## 2018-01-31 DIAGNOSIS — N18.4 CHRONIC KIDNEY DISEASE, STAGE IV (SEVERE): ICD-10-CM

## 2018-01-31 DIAGNOSIS — Z90.5 SOLITARY KIDNEY, ACQUIRED: ICD-10-CM

## 2018-01-31 DIAGNOSIS — I35.0 AORTIC STENOSIS, MODERATE: ICD-10-CM

## 2018-01-31 DIAGNOSIS — I77.9 BILATERAL CAROTID ARTERY DISEASE: Chronic | ICD-10-CM

## 2018-01-31 PROBLEM — N18.9 CKD (CHRONIC KIDNEY DISEASE): Status: RESOLVED | Noted: 2017-05-16 | Resolved: 2018-01-31

## 2018-01-31 PROCEDURE — 1126F AMNT PAIN NOTED NONE PRSNT: CPT | Mod: S$GLB,,, | Performed by: NURSE PRACTITIONER

## 2018-01-31 PROCEDURE — 99214 OFFICE O/P EST MOD 30 MIN: CPT | Mod: S$GLB,,, | Performed by: NURSE PRACTITIONER

## 2018-01-31 PROCEDURE — 99999 PR PBB SHADOW E&M-EST. PATIENT-LVL III: CPT | Mod: PBBFAC,,, | Performed by: NURSE PRACTITIONER

## 2018-01-31 PROCEDURE — 3008F BODY MASS INDEX DOCD: CPT | Mod: S$GLB,,, | Performed by: NURSE PRACTITIONER

## 2018-01-31 PROCEDURE — 99499 UNLISTED E&M SERVICE: CPT | Mod: S$GLB,,, | Performed by: NURSE PRACTITIONER

## 2018-01-31 PROCEDURE — 1159F MED LIST DOCD IN RCRD: CPT | Mod: S$GLB,,, | Performed by: NURSE PRACTITIONER

## 2018-01-31 RX ORDER — TORSEMIDE 20 MG/1
TABLET ORAL
Qty: 90 TABLET | Refills: 3 | Status: CANCELLED | OUTPATIENT
Start: 2018-01-31

## 2018-01-31 RX ORDER — CLONIDINE HYDROCHLORIDE 0.2 MG/1
0.2 TABLET ORAL NIGHTLY
Qty: 90 TABLET | Refills: 3 | Status: SHIPPED | OUTPATIENT
Start: 2018-01-31 | End: 2018-02-26 | Stop reason: SDUPTHER

## 2018-01-31 RX ORDER — TORSEMIDE 20 MG/1
40 TABLET ORAL DAILY
Qty: 60 TABLET | Refills: 11 | Status: SHIPPED | OUTPATIENT
Start: 2018-01-31 | End: 2018-02-09 | Stop reason: SDUPTHER

## 2018-01-31 NOTE — PATIENT INSTRUCTIONS
Please have your blood drawn in 2 weeks.    Increase the torsemide from 20mg to 40mg one time a day.

## 2018-01-31 NOTE — PROGRESS NOTES
Ms. Arnold is a patient of Dr. Wyman and was last seen in [facility] 1/10/2018.    Subjective:   Patient ID:  Ewelina Arnold is a 72 y.o. female who presents for follow-up of Essential hypertension (6 weeks fu) and Dizziness    Problems:   HFpEF, EF 55-60%  CKD stage IV  HLD  HTN  Hypothyroidism  PAD  Living donor, kidney in 1985  45 pack year h/o smoking, quit ~8857-2918  Moderate aortic stenosis, PABLITO = 0.93 cm2, peak velocity = 3.5 m/s, mean gradient = 28 mmHg.   Pulmonary hypertension, 68 mmHg  CAD based on CT chest in 9/2016  Aortic atherosclerosis  Carotid artery disease, left (1-39% in 2012)    HPI  Ms. Arnold is in clinic today for follow up after hospitalization for hypertensive urgency.  She was started on clonidine 0.2mg and she has been woozy, nauseated, and having migraines since she got out of the hospital.  Reports change in her balance and falling into the sofa.  Intolerant to CCB (amlodipine/nifedipine). Thiazide diuretics can not be utilized in this patient given advanced renal disease and solitary kidney. ARB (irbesartan) is maxed at 300mg.  Taking hydralazine 50mg TID.     Review of Systems   Constitution: Positive for weakness and malaise/fatigue. Negative for decreased appetite, diaphoresis, weight gain and weight loss.   Eyes: Negative for visual disturbance.   Cardiovascular: Negative for chest pain, claudication, dyspnea on exertion, irregular heartbeat, leg swelling, near-syncope, orthopnea, palpitations, paroxysmal nocturnal dyspnea and syncope.        Denies chest pressure   Respiratory: Negative for cough, hemoptysis, shortness of breath, sleep disturbances due to breathing and snoring.    Endocrine: Negative for cold intolerance and heat intolerance.   Hematologic/Lymphatic: Negative for bleeding problem. Does not bruise/bleed easily.   Musculoskeletal: Negative for myalgias.   Gastrointestinal: Positive for nausea. Negative for bloating, abdominal pain, anorexia, change in bowel  habit, constipation, diarrhea and vomiting.   Neurological: Positive for headaches and loss of balance. Negative for difficulty with concentration, disturbances in coordination, excessive daytime sleepiness, dizziness, light-headedness and numbness.   Psychiatric/Behavioral: The patient does not have insomnia.      Allergies and current medications updated and reviewed:  Review of patient's allergies indicates:   Allergen Reactions    Ace inhibitors      Other reaction(s): Lips Swelling    Vioxx [rofecoxib]      Other reaction(s): lips swelling    Bactrim [sulfamethoxazole-trimethoprim]      Pt would like this medication to be added due to elevation of creatinine with single kidney.    Norvasc [amlodipine]      Not tolerated    Procardia [nifedipine] Nausea Only and Edema     Feet swelling and nausea, now reports that this has happened in the past and required lasix    Arthrotec 50 [diclofenac-misoprostol]      Other reaction(s): Unknown    Bentyl [dicyclomine]      Not effective    Doxycycline      nausea    Imdur [isosorbide mononitrate] Nausea Only    Lomotil [diphenoxylate-atropine]      Stomach cramps, pt vomitted    Lozol [indapamide] Rash    Tramadol Nausea Only     Current Outpatient Prescriptions   Medication Sig    acetaminophen (TYLENOL) 500 MG tablet Take 2 tablets (1,000 mg total) by mouth every 6 (six) hours as needed for Pain. As needed for headaches    ammonium lactate 12 % Crea Apply 1 application topically 2 (two) times daily as needed.    aspirin (ECOTRIN) 81 MG EC tablet Take 1 tablet (81 mg total) by mouth once daily. (Patient taking differently: Take 81 mg by mouth. )    butalbital-acetaminophen-caffeine -40 mg (FIORICET, ESGIC) -40 mg per tablet Take 1 tablet by mouth every 4 (four) hours as needed for Pain.    cholestyramine (QUESTRAN) 4 gram packet Take 1 packet (4 g total) by mouth 3 (three) times daily with meals.    citalopram (CELEXA) 20 MG tablet Take 1.5  tablets (30 mg total) by mouth once daily.    cloNIDine (CATAPRES) 0.2 MG tablet Take 0.2 mg by mouth every evening.    conjugated estrogens (PREMARIN) vaginal cream Use 1g nightly for two weeks, then 1g twice per week.    hydrALAZINE (APRESOLINE) 50 MG tablet Take 1 tablet (50 mg total) by mouth every 8 (eight) hours.    hydroxychloroquine (PLAQUENIL) 200 mg tablet TAKE 1 TABLET TWICE DAILY    ipratropium (ATROVENT) 0.03 % nasal spray 2 sprays by Nasal route 2 (two) times daily as needed for Rhinitis.    irbesartan (AVAPRO) 300 MG tablet Take 1 tablet (300 mg total) by mouth once daily. please take at 9AM    labetalol (NORMODYNE) 200 MG tablet Take 2 tablets (400 mg total) by mouth every 12 (twelve) hours.    latanoprost 0.005 % ophthalmic solution INSTILL 1 DROP INTO BOTH EYES EVERY DAY    levothyroxine (SYNTHROID) 75 MCG tablet TAKE 1 TABLET (75 MCG TOTAL) BY MOUTH BEFORE BREAKFAST.    meclizine (ANTIVERT) 12.5 mg tablet Take 1 tablet (12.5 mg total) by mouth 3 (three) times daily as needed.    multivitamin capsule Take 1 capsule by mouth once daily.    nitroGLYCERIN (NITROSTAT) 0.4 MG SL tablet Place 1 tablet (0.4 mg total) under the tongue every 5 (five) minutes as needed for Chest pain.    omeprazole (PRILOSEC) 40 MG capsule Take 1 capsule (40 mg total) by mouth once daily.    rosuvastatin (CRESTOR) 40 MG Tab TAKE 1 TABLET EVERY EVENING    spironolactone (ALDACTONE) 25 MG tablet Take 0.5 tablets (12.5 mg total) by mouth 2 (two) times daily.    TENS units Sravanthi 1 application by Misc.(Non-Drug; Combo Route) route daily as needed (pain).    torsemide (DEMADEX) 20 MG Tab Take 1 tablet (20 mg total) by mouth once daily.    triamcinolone acetonide 0.1% (KENALOG) 0.1 % paste PLACE ONTO TEETH TWICE DAILY    VITAMIN D2 50,000 unit capsule TAKE 1 CAPSULE (50,000 UNITS TOTAL) EVERY 30 DAYS     No current facility-administered medications for this visit.      Objective:     Right Arm BP - Sittin/71  "(18 1440)  Left Arm BP - Sittin/73 (18 1440)    BP (!) 174/73 (BP Location: Left arm, Patient Position: Sitting, BP Method: Large (Automatic))   Pulse 60   Ht 5' 5" (1.651 m)   Wt 53.2 kg (117 lb 4.6 oz)   LMP  (LMP Unknown)   SpO2 100%   BMI 19.52 kg/m²     Physical Exam   Constitutional: She is oriented to person, place, and time. Vital signs are normal. She appears well-developed and well-nourished. She is active. No distress.   HENT:   Head: Normocephalic and atraumatic.   Eyes: Conjunctivae and lids are normal. No scleral icterus.   Neck: Neck supple. Normal carotid pulses, no hepatojugular reflux and no JVD present. Carotid bruit is not present.   Cardiovascular: Normal rate, regular rhythm, S1 normal, S2 normal and intact distal pulses.  PMI is not displaced.  Exam reveals no gallop and no friction rub.    Murmur heard.   Harsh midsystolic murmur is present with a grade of 3/6  at the upper right sternal border radiating to the neck  Pulses:       Carotid pulses are 2+ on the right side, and 2+ on the left side.       Radial pulses are 2+ on the right side, and 2+ on the left side.        Dorsalis pedis pulses are 2+ on the right side, and 2+ on the left side.        Posterior tibial pulses are 2+ on the right side, and 2+ on the left side.   Pulmonary/Chest: Effort normal and breath sounds normal. No respiratory distress. She has no decreased breath sounds. She has no wheezes. She has no rhonchi. She has no rales. She exhibits no tenderness.   Abdominal: Soft. Normal appearance and bowel sounds are normal. She exhibits no distension, no fluid wave, no abdominal bruit, no ascites and no pulsatile midline mass. There is no hepatosplenomegaly. There is no tenderness.   Musculoskeletal: She exhibits no edema.   Neurological: She is alert and oriented to person, place, and time. Gait normal.   Skin: Skin is warm, dry and intact. No rash noted. She is not diaphoretic. Nails show no " clubbing.   Psychiatric: She has a normal mood and affect. Her speech is normal and behavior is normal. Judgment and thought content normal. Cognition and memory are normal.   Nursing note and vitals reviewed.      Chemistry        Component Value Date/Time     (L) 01/26/2018 0411    K 4.4 01/26/2018 0411     01/26/2018 0411    CO2 20 (L) 01/26/2018 0411    BUN 60 (H) 01/26/2018 0411    CREATININE 2.6 (H) 01/26/2018 0411    GLU 78 01/26/2018 0411        Component Value Date/Time    CALCIUM 8.6 (L) 01/26/2018 0411    ALKPHOS 81 01/26/2018 0411    AST 38 01/26/2018 0411    ALT 21 01/26/2018 0411    BILITOT 0.2 01/26/2018 0411    ESTGFRAFRICA 20.5 (A) 01/26/2018 0411    EGFRNONAA 17.8 (A) 01/26/2018 0411            Recent Labs  Lab 03/23/17  0952  11/17/17  2118  11/28/17  0433  12/07/17  1048  01/14/18  0700  01/26/18  0411   WHITE BLOOD CELL COUNT 3.72 L  < > 3.61 L  < > 4.68  < > 3.40 L  < > 3.82 L  < > 3.61 L   HEMOGLOBIN 9.9 L  < > 9.7 L  < > 9.1 L  < > 9.5 L  < > 10.1 L  < > 8.8 L   HEMATOCRIT 30.8 L  < > 30.9 L  < > 28.5 L  < > 30.8 L  < > 32.3 L  < > 28.0 L   MCV 83  < > 80 L  < > 81 L  < > 81 L  < > 82  < > 80 L   PLATELETS 209  < > 217  < > 215  < > 228  < > 171  < > 173   BNP  --   --  1,073 H  --  2,331 H  --  1,304 H  --   --   --   --    TSH 0.935  < >  --   --  2.453  --   --   --  1.498  --   --    CHOLESTEROL 123  --   --   --   --   --   --   --   --   --   --    HDL 39 L  --   --   --   --   --   --   --   --   --   --    LDL CHOLESTEROL 68.2  --   --   --   --   --   --   --   --   --   --    TRIGLYCERIDES 79  --   --   --   --   --   --   --   --   --   --    HDL/CHOLESTEROL RATIO 31.7  --   --   --   --   --   --   --   --   --   --    < > = values in this interval not displayed.  Lab Results   Component Value Date    IRON 31 11/29/2017    TIBC 317 11/29/2017    FERRITIN 202 11/29/2017       Recent Labs  Lab 11/27/17  1200   INR 1.0        Test(s) Reviewed  I have reviewed the  following in detail:  [] Stress test   [] Angiography   [x] Echocardiogram   [x] Labs   [] Other:         Assessment/Plan:     Resistant hypertension  Comments:  BP not at goal <130/80. Continue clonidine because benefits outweight the risks of SE. SE will hopefully improve overtime. Increase torsemide to 40mg.  Orders:  -     torsemide (DEMADEX) 20 MG Tab; Take 2 tablets (40 mg total) by mouth once daily.  Dispense: 60 tablet; Refill: 11  -     Basic metabolic panel; Future; Expected date: 02/14/2018  -     cloNIDine (CATAPRES) 0.2 MG tablet; Take 1 tablet (0.2 mg total) by mouth every evening.  Dispense: 90 tablet; Refill: 3    Coronary artery disease due to calcified coronary lesion  Comments:  Asymptomatic. Taking low dose ASA and rosuvastatin 40mg    Bilateral carotid artery disease  Comments:  Mild disease noted in 2012. Continue statin therapy and low dose ASA    Chronic diastolic heart failure  Comments:  Well compensated. No changes  Orders:  -     torsemide (DEMADEX) 20 MG Tab; Take 2 tablets (40 mg total) by mouth once daily.  Dispense: 60 tablet; Refill: 11    Pulmonary hypertension    Aortic stenosis, moderate    Chronic kidney disease, stage IV (severe)  -     Basic metabolic panel; Future; Expected date: 02/14/2018    Solitary kidney, acquired      Follow-up in about 2 weeks (around 2/14/2018).

## 2018-01-31 NOTE — PROGRESS NOTES
1/31/18   Chart reviewed in [facility] and noted pt was discharged on 1/27/18        Progress towards goals:      LT goal 1  Patient/caregiver will accept life style changes to manage and improve symptoms of chf prior to discharge from [facility]. - Priority: high  Short Term Goals  1.  Patient/caregiver will measure and record weights daily and notify doctor if patient gains more than 3 pounds in one day or 5 pounds in one week within 1 month.   met            Pt states she is follow ing a low sodium diet and she is aware of reading labels etc.                                                                                                    2.  Patient/Caregiver will verbalize importance of early intervention for symptoms of CHF and verbalize 2 ways of preventing complications due to disease process within 1 month       Partially met  Pt received and answered the questionnaire for the digital hypertension program on yesterday.  Will follow up ans see when the program starts pt is aware it may take a few days for them to review results and contact her    LT goal 2   Patient/caregiver will have knowledge of resources/benefits available from [employer] care policy in order to obtain the services/benefits that patient is eligible for or needs prior to discharge from [facility].    Short Term Goals  1.  Patient/caregiver will verbalize understanding of appropriate use of [employer] health care benefits within 2 months.   met         Pt is aware of all resources avail from [employer]  She received her meals and they are ok    Plan   Follow up on receipt of the digital hypertension program questionnaire   Close case at next encounter if digital hypertension program is started  Continue to review the information mailed to pt  Follow up on wt and blood pressure  Follow up on start of walking on treadmill       Clinical Reference Documents Added to Patient Instructions       Document    DIET, DISCHARGE INSTRUCTIONS FOR EATING A LOW-SALT   (ENGLISH)    HEART FAILURE, CONGESTIVE (CHF) (ENGLISH)    HEART FAILURE, DISCHARGE INSTRUCTIONS FOR (ENGLISH)    HEART FAILURE: BEING ACTIVE (ENGLISH)    HEART FAILURE: MAKING CHANGES TO YOUR DIET (ENGLISH)    HEART FAILURE: TRACKING YOUR WEIGHT (ENGLISH)    HEART FAILURE: WARNING SIGNS OF A FLARE-UP (ENGLISH)    LOW-SALT CHOICES (ENGLISH)    MY HEART FAILURE SYMPTOMS CHART (ENGLISH)    SALT, TIPS FOR USING LESS (ENGLISH)

## 2018-02-01 ENCOUNTER — TELEPHONE (OUTPATIENT)
Dept: FAMILY MEDICINE | Facility: CLINIC | Age: 73
End: 2018-02-01

## 2018-02-01 NOTE — TELEPHONE ENCOUNTER
----- Message from Oleksandr Pagan sent at 2/1/2018 11:27 AM CST -----  [phone]  Chrissie was calling to get a order for ,please call

## 2018-02-02 ENCOUNTER — NURSE TRIAGE (OUTPATIENT)
Dept: ADMINISTRATIVE | Facility: CLINIC | Age: 73
End: 2018-02-02

## 2018-02-02 NOTE — TELEPHONE ENCOUNTER
----- Message from Portia Downs sent at 2/2/2018  2:50 PM CST -----  Contact: Patient [phone]  Patient is requesting a call about home health information.    Please call and advise.    Thank You

## 2018-02-02 NOTE — TELEPHONE ENCOUNTER
Spoke with Chrissie with [facility] states patient was discharged yesterday from [facility] since they received a verbal per Dr. Ruth to d/c from .  Chrissie states they just received a signed PT order from our office and would like to know if you want out patient PT, or home PT.[phone],

## 2018-02-03 ENCOUNTER — OFFICE VISIT (OUTPATIENT)
Dept: URGENT CARE | Facility: CLINIC | Age: 73
End: 2018-02-03
Payer: MEDICARE

## 2018-02-03 VITALS
BODY MASS INDEX: 18.97 KG/M2 | TEMPERATURE: 97 F | DIASTOLIC BLOOD PRESSURE: 55 MMHG | SYSTOLIC BLOOD PRESSURE: 95 MMHG | WEIGHT: 114 LBS | OXYGEN SATURATION: 99 % | HEART RATE: 62 BPM

## 2018-02-03 DIAGNOSIS — I95.9 HYPOTENSION, UNSPECIFIED HYPOTENSION TYPE: Primary | ICD-10-CM

## 2018-02-03 PROCEDURE — 99214 OFFICE O/P EST MOD 30 MIN: CPT | Mod: S$GLB,,, | Performed by: SURGERY

## 2018-02-03 PROCEDURE — 3008F BODY MASS INDEX DOCD: CPT | Mod: S$GLB,,, | Performed by: SURGERY

## 2018-02-03 PROCEDURE — 1159F MED LIST DOCD IN RCRD: CPT | Mod: S$GLB,,, | Performed by: SURGERY

## 2018-02-03 PROCEDURE — 99499 UNLISTED E&M SERVICE: CPT | Mod: S$GLB,,, | Performed by: SURGERY

## 2018-02-03 NOTE — PATIENT INSTRUCTIONS
Discharge Instructions for Low Blood Pressure (Hypotension)  You have been diagnosed with hypotension. When you have hypotension, your blood pressure is lower than normal. Low blood pressure can make you feel dizzy or faint. This condition is sometimes a side effect of taking certain medicines, including medicines for high blood pressure (hypertension). It can also result from medical conditions such as dehydration.  Home care  These home care steps can help manage your condition:  · Follow your doctors instructions.  · Rest in bed and ask for help with daily activities until you feel better. You may need to slowly increase the amount of time you spend sitting or doing light activity.  · Dont drive while your blood pressure is not controlled.  · Be careful when you get up from sitting or lying down.  ¨ Take your time. Sudden movements can cause dizziness or fainting.  ¨ When you first sit up after lying down, be sure to sit in bed for 30 seconds or so before getting up to walk.  · Tell your doctor about the medicines you are taking. Many kinds of medicines trigger low blood pressure.  · Limit your alcohol intake to no more than 2 drinks a day for men and 1 drink a day for women. Alcohol can dehydrate you even further. It can also interfere with the effectiveness of medicines.  · Prevent dehydration by drinking plenty of fluids, unless otherwise instructed by your doctor  · Learn to take your own blood pressure. Keep a record of your results. Ask your doctor which readings mean that you need medical attention.  · Tell your family members to call an ambulance if you become unconscious. Request that they learn CPR.  Follow-up care  Make a follow-up appointment as directed by our staff.     When to seek medical care  Call your doctor immediately if you have any of the following:  · Chest pain or shortness of breath (call 911)  · Dizziness or fainting spells  · Black, maroon, or tarry stools  · Irregular  heartbeat  · Stiff neck  · Severe upper back pain  · Diarrhea or vomiting that doesnt go away  · Inability to eat or drink  · Burning sensation when you urinate  · Urine with a strong, unpleasant odor  · Fainting with exercise   Date Last Reviewed: 4/27/2016  © 0771-1090 [employer]. [address] [place] [address], [place] [address]. All rights reserved. This information is not intended as a substitute for professional medical care. Always follow your healthcare professional's instructions.

## 2018-02-03 NOTE — TELEPHONE ENCOUNTER
Reason for Disposition   [1] Systolic BP  AND [2] taking blood pressure medications AND [3] NOT dizzy, lightheaded or weak    Protocols used: ST LOW BLOOD PRESSURE-A-    Patient called concerning low bp reading. Tonight her reading is 116/54 HR 70 but she states it has trended lower. She stopped taking the clonidine last night because she doesn't like how it makes her feel. Patient advised to go to urgent care for evaluation and to be informed if she can stop the clonidine without risk of worsening symptoms. Patient verbalized understanding.

## 2018-02-03 NOTE — PROGRESS NOTES
Subjective:       Patient ID: Ewelina Arnold is a 72 y.o. female.    Vitals:  weight is 51.7 kg (114 lb). Her temperature is 97.2 °F (36.2 °C). Her blood pressure is 95/55 (abnormal) and her pulse is 62. Her oxygen saturation is 99%.     Chief Complaint: Hypertension    Pt takes medication for hypertension. Pt has been monitoring bp and its been low. Spoke w/on call nurse about her bp, they recommended her to come to Urgent care. Pt says she doesn't have any sx of the bp being off other then just not feeling herself.      Hypertension   This is a recurrent problem. The current episode started today. Pertinent negatives include no blurred vision, chest pain, headaches or shortness of breath.     Review of Systems   Constitution: Negative for chills and fever.   HENT: Negative for sore throat.    Eyes: Negative for blurred vision.   Cardiovascular: Negative for chest pain.   Respiratory: Negative for shortness of breath.    Skin: Negative for rash.   Musculoskeletal: Negative for back pain and joint pain.   Gastrointestinal: Negative for abdominal pain, diarrhea, nausea and vomiting.   Neurological: Negative for headaches.   Psychiatric/Behavioral: The patient is not nervous/anxious.        Objective:      Physical Exam   Constitutional: She is oriented to person, place, and time. She appears well-developed and well-nourished. She is cooperative.  Non-toxic appearance. She does not appear ill. No distress.   HENT:   Head: Normocephalic and atraumatic.   Right Ear: Hearing, tympanic membrane, external ear and ear canal normal.   Left Ear: Hearing, tympanic membrane, external ear and ear canal normal.   Nose: Nose normal. No mucosal edema, rhinorrhea or nasal deformity. No epistaxis. Right sinus exhibits no maxillary sinus tenderness and no frontal sinus tenderness. Left sinus exhibits no maxillary sinus tenderness and no frontal sinus tenderness.   Mouth/Throat: Uvula is midline, oropharynx is clear and moist and mucous  membranes are normal. No trismus in the jaw. Normal dentition. No uvula swelling. No posterior oropharyngeal erythema.   Eyes: Conjunctivae and lids are normal. Right eye exhibits no discharge. Left eye exhibits no discharge. No scleral icterus.   Sclera clear bilat   Neck: Trachea normal, normal range of motion, full passive range of motion without pain and phonation normal. Neck supple.   Cardiovascular: Normal rate, regular rhythm, normal heart sounds, intact distal pulses and normal pulses.    Pulmonary/Chest: Effort normal and breath sounds normal. No respiratory distress.   Abdominal: Soft. Normal appearance and bowel sounds are normal. She exhibits no distension, no pulsatile midline mass and no mass. There is no tenderness.   Musculoskeletal: Normal range of motion. She exhibits no edema or deformity.   Neurological: She is alert and oriented to person, place, and time. She exhibits normal muscle tone. Coordination normal.   Skin: Skin is warm, dry and intact. She is not diaphoretic. No pallor.   Psychiatric: She has a normal mood and affect. Her speech is normal and behavior is normal. Judgment and thought content normal. Cognition and memory are normal.   Nursing note and vitals reviewed.      Assessment:       1. Hypotension, unspecified hypotension type        Plan:         Hypotension, unspecified hypotension type    Will hold clonidine and take as needed  For BP over 150. She has not taken daily meds. Will go home and rest, hydrate, resume all other BP meds once her systolic BP is 120 or over. Call for any questions. Follow up with cardiologist Monday. I printed out and reviewed her list of meds on AVS and she understands and is not confused regarding medications and dosage.         Patient Instructions       Discharge Instructions for Low Blood Pressure (Hypotension)  You have been diagnosed with hypotension. When you have hypotension, your blood pressure is lower than normal. Low blood pressure can  make you feel dizzy or faint. This condition is sometimes a side effect of taking certain medicines, including medicines for high blood pressure (hypertension). It can also result from medical conditions such as dehydration.  Home care  These home care steps can help manage your condition:  · Follow your doctors instructions.  · Rest in bed and ask for help with daily activities until you feel better. You may need to slowly increase the amount of time you spend sitting or doing light activity.  · Dont drive while your blood pressure is not controlled.  · Be careful when you get up from sitting or lying down.  ¨ Take your time. Sudden movements can cause dizziness or fainting.  ¨ When you first sit up after lying down, be sure to sit in bed for 30 seconds or so before getting up to walk.  · Tell your doctor about the medicines you are taking. Many kinds of medicines trigger low blood pressure.  · Limit your alcohol intake to no more than 2 drinks a day for men and 1 drink a day for women. Alcohol can dehydrate you even further. It can also interfere with the effectiveness of medicines.  · Prevent dehydration by drinking plenty of fluids, unless otherwise instructed by your doctor  · Learn to take your own blood pressure. Keep a record of your results. Ask your doctor which readings mean that you need medical attention.  · Tell your family members to call an ambulance if you become unconscious. Request that they learn CPR.  Follow-up care  Make a follow-up appointment as directed by our staff.     When to seek medical care  Call your doctor immediately if you have any of the following:  · Chest pain or shortness of breath (call 911)  · Dizziness or fainting spells  · Black, maroon, or tarry stools  · Irregular heartbeat  · Stiff neck  · Severe upper back pain  · Diarrhea or vomiting that doesnt go away  · Inability to eat or drink  · Burning sensation when you urinate  · Urine with a strong, unpleasant  odor  · Fainting with exercise   Date Last Reviewed: 4/27/2016  © 0608-0832 [employer]. [address] [place] [address], [place] [address]. All rights reserved. This information is not intended as a substitute for professional medical care. Always follow your healthcare professional's instructions.

## 2018-02-05 RX ORDER — CYPROHEPTADINE HYDROCHLORIDE 4 MG/1
4 TABLET ORAL NIGHTLY
Qty: 30 TABLET | Refills: 1 | Status: ON HOLD | OUTPATIENT
Start: 2018-02-05 | End: 2018-03-28 | Stop reason: HOSPADM

## 2018-02-05 NOTE — TELEPHONE ENCOUNTER
"Spoke with Chrissie with [facility] states she received a signed order stating "patient with no appetite 2lb weight loss in 3 days c/o dizziness at all time"  Since a verbal was given to d/c from  she would like to know how to proceed.   "

## 2018-02-06 ENCOUNTER — TELEPHONE (OUTPATIENT)
Dept: NEPHROLOGY | Facility: CLINIC | Age: 73
End: 2018-02-06

## 2018-02-06 DIAGNOSIS — N18.4 CHRONIC KIDNEY DISEASE, STAGE IV (SEVERE): Primary | ICD-10-CM

## 2018-02-06 NOTE — TELEPHONE ENCOUNTER
Please call to schedule rfp today or tomorrow, let her know she won't have to do BMP Thursday, thank you.  (Routing comment)     Called pt l/m

## 2018-02-07 ENCOUNTER — LAB VISIT (OUTPATIENT)
Dept: LAB | Facility: HOSPITAL | Age: 73
End: 2018-02-07
Attending: INTERNAL MEDICINE
Payer: MEDICARE

## 2018-02-07 DIAGNOSIS — I1A.0 RESISTANT HYPERTENSION: Chronic | ICD-10-CM

## 2018-02-07 DIAGNOSIS — N18.4 CHRONIC KIDNEY DISEASE, STAGE IV (SEVERE): ICD-10-CM

## 2018-02-07 DIAGNOSIS — E78.00 PURE HYPERCHOLESTEROLEMIA: Chronic | ICD-10-CM

## 2018-02-07 DIAGNOSIS — I25.84 CORONARY ARTERY DISEASE DUE TO CALCIFIED CORONARY LESION: ICD-10-CM

## 2018-02-07 DIAGNOSIS — I25.10 CORONARY ARTERY DISEASE DUE TO CALCIFIED CORONARY LESION: ICD-10-CM

## 2018-02-07 LAB
ALBUMIN SERPL BCP-MCNC: 2.9 G/DL
ALBUMIN SERPL BCP-MCNC: 2.9 G/DL
ALP SERPL-CCNC: 82 U/L
ALT SERPL W/O P-5'-P-CCNC: 21 U/L
ANION GAP SERPL CALC-SCNC: 7 MMOL/L
AST SERPL-CCNC: 40 U/L
BILIRUB SERPL-MCNC: 0.2 MG/DL
BUN SERPL-MCNC: 61 MG/DL
CALCIUM SERPL-MCNC: 9.1 MG/DL
CHLORIDE SERPL-SCNC: 103 MMOL/L
CHOLEST SERPL-MCNC: 108 MG/DL
CHOLEST/HDLC SERPL: 2.5 {RATIO}
CO2 SERPL-SCNC: 24 MMOL/L
CREAT SERPL-MCNC: 3.3 MG/DL
EST. GFR  (AFRICAN AMERICAN): 15.4 ML/MIN/1.73 M^2
EST. GFR  (NON AFRICAN AMERICAN): 13.3 ML/MIN/1.73 M^2
GLUCOSE SERPL-MCNC: 63 MG/DL
HDLC SERPL-MCNC: 44 MG/DL
HDLC SERPL: 40.7 %
LDLC SERPL CALC-MCNC: 54.8 MG/DL
NONHDLC SERPL-MCNC: 64 MG/DL
PHOSPHATE SERPL-MCNC: 4.6 MG/DL
POTASSIUM SERPL-SCNC: 5.8 MMOL/L
PROT SERPL-MCNC: 7 G/DL
SODIUM SERPL-SCNC: 134 MMOL/L
TRIGL SERPL-MCNC: 46 MG/DL

## 2018-02-07 PROCEDURE — 80053 COMPREHEN METABOLIC PANEL: CPT

## 2018-02-07 PROCEDURE — 36415 COLL VENOUS BLD VENIPUNCTURE: CPT | Mod: PO

## 2018-02-07 PROCEDURE — 80061 LIPID PANEL: CPT

## 2018-02-07 PROCEDURE — 80069 RENAL FUNCTION PANEL: CPT

## 2018-02-08 ENCOUNTER — TELEPHONE (OUTPATIENT)
Dept: FAMILY MEDICINE | Facility: CLINIC | Age: 73
End: 2018-02-08

## 2018-02-08 ENCOUNTER — PATIENT MESSAGE (OUTPATIENT)
Dept: CARDIOLOGY | Facility: CLINIC | Age: 73
End: 2018-02-08

## 2018-02-08 ENCOUNTER — TELEPHONE (OUTPATIENT)
Dept: CARDIOLOGY | Facility: CLINIC | Age: 73
End: 2018-02-08

## 2018-02-08 NOTE — TELEPHONE ENCOUNTER
----- Message from Kiah Crowe sent at 2/8/2018  3:59 PM CST -----  Contact: Home: [phone]   Patient want to know why you're discharging her from  home health?

## 2018-02-08 NOTE — TELEPHONE ENCOUNTER
She no longer meets medicare's criteria for home health care. According to the home health agencies guidelines.  They have provided all the eligible care to her

## 2018-02-08 NOTE — TELEPHONE ENCOUNTER
Ms. Hartmann, Please see the pt's [facility] message from today. Pt says that her BP this am was 95/47 HR 76, and today at 1:15 pm was 101/50 HR 60.  We reviewed her cardiac meds - and is taking per [facility] med list - except for the clonidine - says she doesn't know what to do about taking it since her pressure is lower. Says is not having a HA today, but had a terrible HA yesterday. Asked her if she could bring her BP cuff in since her reading yesterday was much lower than the  nurs's reading - says that she is not able to drive and that the  nurse told her that she would not be coming back - the service was discontinued.  Please advise. Thanks, Viky

## 2018-02-08 NOTE — TELEPHONE ENCOUNTER
----- Message from Louise Harry sent at 2/8/2018 12:49 PM CST -----  Contact: Pt   Pt has questions about her Clonidine 0.2 mg   LOV 1/31/18 Np [place]ytens    Thanks

## 2018-02-08 NOTE — TELEPHONE ENCOUNTER
Spoke with Ms. Hartmann, says for the pt to cut clonidine in half, and when she comes for her f/u on 2-26-18 hanh all of her medications and her BP cuff with her. Instructions given to the pt and she verbalized understanding.

## 2018-02-09 ENCOUNTER — TELEPHONE (OUTPATIENT)
Dept: CARDIOLOGY | Facility: CLINIC | Age: 73
End: 2018-02-09

## 2018-02-09 DIAGNOSIS — I1A.0 RESISTANT HYPERTENSION: Chronic | ICD-10-CM

## 2018-02-09 DIAGNOSIS — I50.32 CHRONIC DIASTOLIC HEART FAILURE: ICD-10-CM

## 2018-02-09 DIAGNOSIS — E87.5 HYPERKALEMIA: Primary | ICD-10-CM

## 2018-02-09 RX ORDER — TORSEMIDE 20 MG/1
40 TABLET ORAL DAILY
Qty: 180 TABLET | Refills: 3 | Status: SHIPPED | OUTPATIENT
Start: 2018-02-09 | End: 2018-03-19

## 2018-02-09 RX ORDER — TORSEMIDE 20 MG/1
40 TABLET ORAL DAILY
Qty: 180 TABLET | Refills: 3 | Status: SHIPPED | OUTPATIENT
Start: 2018-02-09 | End: 2018-02-09 | Stop reason: SDUPTHER

## 2018-02-09 NOTE — TELEPHONE ENCOUNTER
Spoke with patient and she verbalized understanding. She is scheduled for lab 2/16/18.    DEJUAN [facility] Staff; Pedrito Tapia MD 4 hours ago (12:49 PM)      Please call Ms. Arnold and have her stop the spironolactone because her potassium is 5.8.  She needs a repeat bmp in 1 week.   Thanks!   Beatriz (Routing comment)       Beatriz Hartmann NP 4 hours ago (12:46 PM)         Pt has CKD stage IV and her potassium level is 5.8. Stop spironolactone.

## 2018-02-09 NOTE — TELEPHONE ENCOUNTER
----- Message from Louise Harry sent at 2/9/2018 12:17 PM CST -----  Contact: PT  Pt says she spoke to [facility] and they still have not received the refill for her Demadex 20 mg. Pt would like a call once this is done, and she would like a call in ref to this please.  LOV 1/31/18 Np Heytens    Thanks

## 2018-02-09 NOTE — TELEPHONE ENCOUNTER
Spoke with the pt and let her know that her prescription was sent to [facility] today - the pt verbalized understanding.

## 2018-02-12 ENCOUNTER — OUTPATIENT CASE MANAGEMENT (OUTPATIENT)
Dept: ADMINISTRATIVE | Facility: OTHER | Age: 73
End: 2018-02-12

## 2018-02-12 NOTE — PROGRESS NOTES
1/31/18   Chart reviewed in [facility] and noted pt was discharged on 1/27/18        Progress towards goals:      LT goal 1  Patient/caregiver will accept life style changes to manage and improve symptoms of chf prior to discharge from [facility]. - Priority: high  Short Term Goals                                                                                  1.  Patient/Caregiver will verbalize importance of early intervention for symptoms of CHF and verbalize 2 ways of preventing complications due to disease process within 1 month        met  LT goal   Short Term Goals  Patient/caregiver will measure and record the blood pressure 2 times per day for 1 week.  Pt is recording her blood pressure daily.  She is aware of  the normal blood pressure range and notify on call etc as needed.  Pt is still concerned about taking the new medication clonidine and we discussed in detail the medication.  She is taking 1/2 of the original dosage and not as much as  A low response.  Advised we can discuss the digital program in detail when she calls me back    Patient/caregiver will obtain blood pressure cuff to support disease process within 1 week.  Interventions   · Assess for availability of working blood pressure cuff in home setting.         Pt completed the dig HBP questionnaire and needs to go to o bar for final step for sign up for digital blood pressure monitoring  · Facilitate to referral to Digital Hypertension program  Done  Call to pharmacy and referred to o bar to complete process.  Call to pt to advised and she states she has company and she will need to call me back       Status  · Partially met              Plan   Follow up on final step to digital hypertension program  Close case at next encounter if digital hypertension program is started  Continue to review the information mailed to pt  Follow up on wt and blood pressure  Follow up on start of walking on treadmill       Clinical Reference Documents Added to Patient  Instructions       Document    DIET, DISCHARGE INSTRUCTIONS FOR EATING A LOW-SALT  (ENGLISH)    HEART FAILURE, CONGESTIVE (CHF) (ENGLISH)    HEART FAILURE, DISCHARGE INSTRUCTIONS FOR (ENGLISH)    HEART FAILURE: BEING ACTIVE (ENGLISH)    HEART FAILURE: MAKING CHANGES TO YOUR DIET (ENGLISH)    HEART FAILURE: TRACKING YOUR WEIGHT (ENGLISH)    HEART FAILURE: WARNING SIGNS OF A FLARE-UP (ENGLISH)    LOW-SALT CHOICES (ENGLISH)    MY HEART FAILURE SYMPTOMS CHART (ENGLISH)    SALT, TIPS FOR USING LESS (ENGLISH)

## 2018-02-14 NOTE — PROGRESS NOTES
Assessment /Plan     For exam results, see Encounter Report.    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 Medication interaction    Short duration  Not typical for migraine activity        Residual Angle Closure  Stable Glaucoma & Patient near target IOP range.  Will continue with same medicines as patient is tolerating well with good adherence    s/p PI OU    Not a VF taker  Fell asleep during testing 2/28/2018  VF not cw OCT or ONH exam      CCT  535 // 537    Target mid teens    Both eyes  Xal q HS    SLE on plaquenil > 5years  Amsler chart routine with Q & A  Doing well    SD-OCT Mac good Nl contour and thickness OU  Good COST/OLM integrity      NSC OU  Observe    Visual Hallucinations  Potted plants / trees at night            Plan  PCP with sleep disorder / visual hallucinations  RTC 2 months for IOP,  OCT Mac & RNFL --> machine down 2/28/2018 and need to reschedule  RTC sooner prn with good understanding

## 2018-02-16 ENCOUNTER — LAB VISIT (OUTPATIENT)
Dept: LAB | Facility: HOSPITAL | Age: 73
End: 2018-02-16
Attending: FAMILY MEDICINE
Payer: MEDICARE

## 2018-02-16 DIAGNOSIS — E87.5 HYPERKALEMIA: ICD-10-CM

## 2018-02-16 LAB
ANION GAP SERPL CALC-SCNC: 9 MMOL/L
BUN SERPL-MCNC: 67 MG/DL
CALCIUM SERPL-MCNC: 9.2 MG/DL
CHLORIDE SERPL-SCNC: 106 MMOL/L
CO2 SERPL-SCNC: 20 MMOL/L
CREAT SERPL-MCNC: 2.8 MG/DL
EST. GFR  (AFRICAN AMERICAN): 18.7 ML/MIN/1.73 M^2
EST. GFR  (NON AFRICAN AMERICAN): 16.2 ML/MIN/1.73 M^2
GLUCOSE SERPL-MCNC: 72 MG/DL
POTASSIUM SERPL-SCNC: 4.5 MMOL/L
SODIUM SERPL-SCNC: 135 MMOL/L

## 2018-02-16 PROCEDURE — 36415 COLL VENOUS BLD VENIPUNCTURE: CPT | Mod: PO

## 2018-02-16 PROCEDURE — 80048 BASIC METABOLIC PNL TOTAL CA: CPT

## 2018-02-19 ENCOUNTER — OUTPATIENT CASE MANAGEMENT (OUTPATIENT)
Dept: ADMINISTRATIVE | Facility: OTHER | Age: 73
End: 2018-02-19

## 2018-02-19 NOTE — PROGRESS NOTES
2/19/18   Chart reviewed in epic         Progress towards goals:  Hypertension-pt advised that she needs to go to o [facility] to complete the registration for digital hypertension and purchase the cuff.  She expressed concern about the expense for the cuff.  I advised that although it is a cost it can potentially save her in the long run because they will review her blood pressure daily and communicate with her when things are abnormal.  She will have a direct line to them to discuss her concerns instead of having to message pcp etc.  They can make medication adjustments over the phone with her and there is no charge after the purchase of the cuff.  Pt felt a little better and is considering.  Advised there is o bar at [facility] and [facility].  She states she is closer to [facility] and she has seen it in the past.  Advised that I will call again in 2 weeks and the plan is to close her case at that time.            Plan   Follow up on final step to digital hypertension program  Close case at next encounter if digital hypertension program is started  Continue to review the information mailed to pt  Follow up on wt and blood pressure  Follow up on start of walking on treadmill       Clinical Reference Documents Added to Patient Instructions       Document    DIET, DISCHARGE INSTRUCTIONS FOR EATING A LOW-SALT  (ENGLISH)    HEART FAILURE, CONGESTIVE (CHF) (ENGLISH)    HEART FAILURE, DISCHARGE INSTRUCTIONS FOR (ENGLISH)    HEART FAILURE: BEING ACTIVE (ENGLISH)    HEART FAILURE: MAKING CHANGES TO YOUR DIET (ENGLISH)    HEART FAILURE: TRACKING YOUR WEIGHT (ENGLISH)    HEART FAILURE: WARNING SIGNS OF A FLARE-UP (ENGLISH)    LOW-SALT CHOICES (ENGLISH)    MY HEART FAILURE SYMPTOMS CHART (ENGLISH)    SALT, TIPS FOR USING LESS (ENGLISH)

## 2018-02-21 ENCOUNTER — PATIENT MESSAGE (OUTPATIENT)
Dept: CARDIOLOGY | Facility: CLINIC | Age: 73
End: 2018-02-21

## 2018-02-21 ENCOUNTER — NURSE TRIAGE (OUTPATIENT)
Dept: ADMINISTRATIVE | Facility: CLINIC | Age: 73
End: 2018-02-21

## 2018-02-21 ENCOUNTER — TELEPHONE (OUTPATIENT)
Dept: ENDOCRINOLOGY | Facility: CLINIC | Age: 73
End: 2018-02-21

## 2018-02-21 NOTE — TELEPHONE ENCOUNTER
Spoke with pt and let her know that the reason she has and appointment scheduled is because she a hospital discharge per Dr. Sin.      ----- Message from Shana Ann sent at 2/21/2018 11:10 AM CST -----  Contact: Self   [phone]  Pt calling to get more information on her upcoming schedule appt , give the pt a call back to discuss

## 2018-02-22 ENCOUNTER — TELEPHONE (OUTPATIENT)
Dept: CARDIOLOGY | Facility: CLINIC | Age: 73
End: 2018-02-22

## 2018-02-22 NOTE — TELEPHONE ENCOUNTER
----- Message from Kelli Carpenter MA sent at 2/22/2018  8:42 AM CST -----  Contact: patient called  Please call the patient at [phone] she would like to talk to Beatriz Hartmann about her blood pressure she is f/u/ on 2-. Thank you.

## 2018-02-22 NOTE — TELEPHONE ENCOUNTER
Beatriz Hartmann NP notified of Pt BP. Pt instructed to take Avapro 300 mg one tablet as ordered  and continue to monitor BP. Pt also instructed to call with readings in am and keep appointment on 2/26/18. Pt verbalized back knowledge.

## 2018-02-22 NOTE — TELEPHONE ENCOUNTER
"Message to Beatriz Hartmann, DEJUAN in cardiology, to report Ewelina's concerns about her increasing BP.  Mild headache, no other symptoms.  Home care advice given, to include avoid sodium, daily walk for exercise, and call back for BP > 160/90 or any other concerns.  She has appointment with Ms Hartmann on Monday, but wants a call from her tomorrow, Thursday.  Please contact caller directly with any additional care advice.      Reason for Disposition   [1] BP  >= 140/90 AND [2] taking BP medications    Answer Assessment - Initial Assessment Questions  1. BLOOD PRESSURE: "What is the blood pressure?" "Did you take at least two measurements 5 minutes apart?"      173/75, 165/84, 180/75    2. ONSET: "When did you take your blood pressure?"      Today.    3. HOW: "How did you obtain the blood pressure?" (e.g., visiting nurse, automatic home BP monitor)      Automatic digital home cuff.    4. HISTORY: "Do you have a history of high blood pressure?"      Yes.    5. MEDICATIONS: "Are you taking any medications for blood pressure?" "Have you missed any doses recently?"      Yes. No missed doses.    6. OTHER SYMPTOMS: "Do you have any symptoms?" (e.g., headache, chest pain, blurred vision, difficulty breathing, weakness)      Mild headache today.    7. PREGNANCY: "Is there any chance you are pregnant?" "When was your last menstrual period?"      N/a    Protocols used: ST HIGH BLOOD PRESSURE-A-      "

## 2018-02-22 NOTE — TELEPHONE ENCOUNTER
/84 and 177/76 . Has been complaining of headache. Pt  only taking 1/2 tablet of Avapro 300 mg everyday. Please advise.

## 2018-02-23 ENCOUNTER — OFFICE VISIT (OUTPATIENT)
Dept: FAMILY MEDICINE | Facility: CLINIC | Age: 73
End: 2018-02-23
Payer: MEDICARE

## 2018-02-23 ENCOUNTER — TELEPHONE (OUTPATIENT)
Dept: CARDIOLOGY | Facility: CLINIC | Age: 73
End: 2018-02-23

## 2018-02-23 ENCOUNTER — OFFICE VISIT (OUTPATIENT)
Dept: CARDIOLOGY | Facility: CLINIC | Age: 73
End: 2018-02-23
Payer: MEDICARE

## 2018-02-23 VITALS
BODY MASS INDEX: 19.46 KG/M2 | HEIGHT: 64 IN | DIASTOLIC BLOOD PRESSURE: 80 MMHG | WEIGHT: 114 LBS | TEMPERATURE: 98 F | SYSTOLIC BLOOD PRESSURE: 152 MMHG | HEART RATE: 72 BPM

## 2018-02-23 VITALS
DIASTOLIC BLOOD PRESSURE: 84 MMHG | BODY MASS INDEX: 19.03 KG/M2 | HEIGHT: 65 IN | HEART RATE: 73 BPM | WEIGHT: 114.19 LBS | SYSTOLIC BLOOD PRESSURE: 206 MMHG

## 2018-02-23 DIAGNOSIS — N18.30 HYPERTENSIVE KIDNEY DISEASE WITH CHRONIC KIDNEY DISEASE STAGE III: Primary | ICD-10-CM

## 2018-02-23 DIAGNOSIS — J31.0 RHINITIS, UNSPECIFIED CHRONICITY, UNSPECIFIED TYPE: Primary | ICD-10-CM

## 2018-02-23 DIAGNOSIS — I12.9 HYPERTENSIVE KIDNEY DISEASE WITH CHRONIC KIDNEY DISEASE STAGE III: Primary | ICD-10-CM

## 2018-02-23 PROCEDURE — 99999 PR PBB SHADOW E&M-EST. PATIENT-LVL V: CPT | Mod: PBBFAC,,, | Performed by: INTERNAL MEDICINE

## 2018-02-23 PROCEDURE — 3008F BODY MASS INDEX DOCD: CPT | Mod: S$GLB,,, | Performed by: NURSE PRACTITIONER

## 2018-02-23 PROCEDURE — 99213 OFFICE O/P EST LOW 20 MIN: CPT | Mod: S$GLB,,, | Performed by: INTERNAL MEDICINE

## 2018-02-23 PROCEDURE — 3008F BODY MASS INDEX DOCD: CPT | Mod: S$GLB,,, | Performed by: INTERNAL MEDICINE

## 2018-02-23 PROCEDURE — 99499 UNLISTED E&M SERVICE: CPT | Mod: S$GLB,,, | Performed by: INTERNAL MEDICINE

## 2018-02-23 PROCEDURE — 1125F AMNT PAIN NOTED PAIN PRSNT: CPT | Mod: S$GLB,,, | Performed by: NURSE PRACTITIONER

## 2018-02-23 PROCEDURE — 99999 PR PBB SHADOW E&M-EST. PATIENT-LVL V: CPT | Mod: PBBFAC,,, | Performed by: NURSE PRACTITIONER

## 2018-02-23 PROCEDURE — 1126F AMNT PAIN NOTED NONE PRSNT: CPT | Mod: S$GLB,,, | Performed by: INTERNAL MEDICINE

## 2018-02-23 PROCEDURE — 99214 OFFICE O/P EST MOD 30 MIN: CPT | Mod: S$GLB,,, | Performed by: NURSE PRACTITIONER

## 2018-02-23 PROCEDURE — 1159F MED LIST DOCD IN RCRD: CPT | Mod: S$GLB,,, | Performed by: INTERNAL MEDICINE

## 2018-02-23 PROCEDURE — 1159F MED LIST DOCD IN RCRD: CPT | Mod: S$GLB,,, | Performed by: NURSE PRACTITIONER

## 2018-02-23 RX ORDER — HYDRALAZINE HYDROCHLORIDE 50 MG/1
100 TABLET, FILM COATED ORAL EVERY 8 HOURS
Qty: 180 TABLET | Refills: 11 | Status: ON HOLD | OUTPATIENT
Start: 2018-02-23 | End: 2018-03-28 | Stop reason: HOSPADM

## 2018-02-23 RX ORDER — AZELASTINE 1 MG/ML
1 SPRAY, METERED NASAL 2 TIMES DAILY
Qty: 30 ML | Refills: 1 | Status: ON HOLD | OUTPATIENT
Start: 2018-02-23 | End: 2018-03-28 | Stop reason: HOSPADM

## 2018-02-23 NOTE — PROGRESS NOTES
Subjective:       Patient ID: Ewelina Arnold is a 72 y.o. female.    Chief Complaint: Nasal Congestion; Cough; and Otalgia    Pt here c/o rhinitis, cough on and off for 4 weeks.  Denies fever/chills/n/v/d. Some headache and elevated BP, saw cardiology today and BP meds were adjusted.       Cough   Associated symptoms include ear pain, headaches, postnasal drip and rhinorrhea. Pertinent negatives include no chest pain, chills, fever, myalgias, rash or sore throat.   Otalgia    Associated symptoms include coughing, headaches and rhinorrhea. Pertinent negatives include no abdominal pain, diarrhea, neck pain, rash, sore throat or vomiting.     Past Medical History:   Diagnosis Date    Acute on chronic diastolic congestive heart failure 11/17/2017    Allergy     Anatomical narrow angle glaucoma     Anemia     States diagnosed about a month ago    Aortic atherosclerosis     Arthritis     Cataract     CHF (congestive heart failure)     Chronic kidney disease     Chronic sciatica of left side     Right lower back pain due to sciatica    Coronary artery disease     Depression     Dry eyes     GERD (gastroesophageal reflux disease)     History of colon cancer     Hyperlipidemia     Hypertension     Hypothyroidism     Left ventricular diastolic dysfunction with preserved systolic function     Lupus (systemic lupus erythematosus) 8/17/2012    Malnutrition 5/16/2017    Osteoarthritis of cervical spine 8/17/2012    Osteopenia     PAD (peripheral artery disease)      Past Surgical History:   Procedure Laterality Date    CARDIAC CATHETERIZATION  07/27/2011    x1    CHOLECYSTECTOMY  1999    COLON SURGERY  2000 & 2003    partial removal    COLONOSCOPY N/A 4/14/2016    Procedure: COLONOSCOPY;  Surgeon: Jose Steen MD;  Location: [facility] ([address] [place] [address]);  Service: Endoscopy;  Laterality: N/A;  prep 2 days prior light meals only/clear liquid day before  and 4 dulcolax tabs (5 mgs) at noon    COLONOSCOPY N/A  "9/14/2017    Procedure: COLONOSCOPY;  Surgeon: Jose Steen MD;  Location: [facility] ([address] [place] [address]);  Service: Endoscopy;  Laterality: N/A;    EYE SURGERY      HAND SURGERY Bilateral 1996 & 1997    due to carpal tunnel     HERNIA REPAIR      HYSTERECTOMY  1983    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    OOPHORECTOMY      removed one    Peripheral Iridotomy both eyes  1994    laser angle correction    THYROIDECTOMY, PARTIAL  2006    to remove two nodules and one-half thyroid     Social History     Social History Narrative    No narrative on file     Family History   Problem Relation Age of Onset    Heart attack Mother     Hypertension Mother     Heart disease Father     Hypertension Father     Diabetes Maternal Grandmother     Hypertension Sister     Kidney disease Brother     Kidney failure Brother      received donated kidney from sister    No Known Problems Daughter     Diabetes Son     Hypertension Brother     Diabetes Maternal Aunt     No Known Problems Maternal Grandfather     No Known Problems Paternal Grandmother     No Known Problems Paternal Grandfather     Acne Neg Hx     Eczema Neg Hx     Lupus Neg Hx     Psoriasis Neg Hx     Melanoma Neg Hx      Vitals:    02/23/18 1334   BP: (!) 152/80   Pulse: 72   Temp: 98 °F (36.7 °C)   TempSrc: Oral   Weight: 51.7 kg (113 lb 15.7 oz)   Height: 5' 4.17" (1.63 m)   PainSc:   2     Outpatient Encounter Prescriptions as of 2/23/2018   Medication Sig Dispense Refill    acetaminophen (TYLENOL) 500 MG tablet Take 2 tablets (1,000 mg total) by mouth every 6 (six) hours as needed for Pain. As needed for headaches 60 tablet 0    ammonium lactate 12 % Crea Apply 1 application topically 2 (two) times daily as needed. 385 g 2    aspirin (ECOTRIN) 81 MG EC tablet Take 1 tablet (81 mg total) by mouth once daily. (Patient taking differently: Take 81 mg by mouth. )  0    citalopram (CELEXA) 20 MG tablet Take 1.5 tablets (30 mg total) by mouth once " daily. 135 tablet 1    cloNIDine (CATAPRES) 0.2 MG tablet Take 1 tablet (0.2 mg total) by mouth every evening. 90 tablet 3    cyproheptadine (PERIACTIN) 4 mg tablet Take 1 tablet (4 mg total) by mouth every evening. 30 tablet 1    hydrALAZINE (APRESOLINE) 50 MG tablet Take 2 tablets (100 mg total) by mouth every 8 (eight) hours. 180 tablet 11    hydroxychloroquine (PLAQUENIL) 200 mg tablet TAKE 1 TABLET TWICE DAILY 180 tablet 1    ipratropium (ATROVENT) 0.03 % nasal spray 2 sprays by Nasal route 2 (two) times daily as needed for Rhinitis. 30 mL 0    irbesartan (AVAPRO) 300 MG tablet Take 1 tablet (300 mg total) by mouth once daily. please take at 9AM 90 tablet 3    labetalol (NORMODYNE) 200 MG tablet Take 2 tablets (400 mg total) by mouth every 12 (twelve) hours. 120 tablet 11    latanoprost 0.005 % ophthalmic solution INSTILL 1 DROP INTO BOTH EYES EVERY DAY 3 Bottle 3    levothyroxine (SYNTHROID) 75 MCG tablet TAKE 1 TABLET (75 MCG TOTAL) BY MOUTH BEFORE BREAKFAST. 90 tablet 0    multivitamin capsule Take 1 capsule by mouth once daily.      rosuvastatin (CRESTOR) 40 MG Tab TAKE 1 TABLET EVERY EVENING 90 tablet 3    torsemide (DEMADEX) 20 MG Tab Take 2 tablets (40 mg total) by mouth once daily. 180 tablet 3    triamcinolone acetonide 0.1% (KENALOG) 0.1 % paste PLACE ONTO TEETH TWICE DAILY 5 g 0    VITAMIN D2 50,000 unit capsule TAKE 1 CAPSULE (50,000 UNITS TOTAL) EVERY 30 DAYS 3 capsule 0    azelastine (ASTELIN) 137 mcg (0.1 %) nasal spray 1 spray (137 mcg total) by Nasal route 2 (two) times daily. 30 mL 1    cholestyramine (QUESTRAN) 4 gram packet Take 1 packet (4 g total) by mouth 3 (three) times daily with meals. 90 packet 4    conjugated estrogens (PREMARIN) vaginal cream Use 1g nightly for two weeks, then 1g twice per week. 30 g 12    meclizine (ANTIVERT) 12.5 mg tablet Take 1 tablet (12.5 mg total) by mouth 3 (three) times daily as needed. 90 tablet 1    nitroGLYCERIN (NITROSTAT) 0.4 MG SL  "tablet Place 1 tablet (0.4 mg total) under the tongue every 5 (five) minutes as needed for Chest pain. 30 tablet 3    omeprazole (PRILOSEC) 40 MG capsule Take 1 capsule (40 mg total) by mouth once daily. 90 capsule 3    TENS units Sravanthi 1 application by Misc.(Non-Drug; Combo Route) route daily as needed (pain). 1 Device 0    [DISCONTINUED] hydrALAZINE (APRESOLINE) 50 MG tablet Take 1 tablet (50 mg total) by mouth every 8 (eight) hours. 90 tablet 11     No facility-administered encounter medications on file as of 2/23/2018.      Wt Readings from Last 3 Encounters:   02/23/18 51.7 kg (113 lb 15.7 oz)   02/23/18 51.8 kg (114 lb 3.2 oz)   02/03/18 51.7 kg (114 lb)     Last 3 sets of Vitals    Vitals - 1 value per visit 2/3/2018 2/23/2018 2/23/2018   SYSTOLIC 95 206 152   DIASTOLIC 55 84 80   PULSE 62 73 72   TEMPERATURE 97.2 - 98   RESPIRATIONS - - -   SPO2 99 - -   Weight (lb) 114 114.2 113.98   Weight (kg) 51.71 51.8 51.7   HEIGHT - 5' 5" 5' 4.173"   BODY MASS INDEX 18.97 19 19.46   VISIT REPORT - - -   Pain Score  - 0 2   Some recent data might be hidden     No results found for: CBC  Review of Systems   Constitutional: Negative for activity change, appetite change, chills, diaphoresis, fatigue, fever and unexpected weight change.   HENT: Positive for ear pain, postnasal drip and rhinorrhea. Negative for sinus pain, sinus pressure, sneezing, sore throat, tinnitus, trouble swallowing and voice change.    Respiratory: Positive for cough.    Cardiovascular: Negative for chest pain and palpitations.   Gastrointestinal: Negative for abdominal pain, diarrhea, nausea and vomiting.   Musculoskeletal: Negative for arthralgias, myalgias, neck pain and neck stiffness.   Skin: Negative for rash.   Neurological: Positive for headaches. Negative for dizziness, facial asymmetry and light-headedness.   Hematological: Negative for adenopathy.   Psychiatric/Behavioral: Negative for sleep disturbance.       Objective:      Physical " Exam   Constitutional: She is oriented to person, place, and time. She appears well-developed and well-nourished. No distress.   HENT:   Head: Normocephalic and atraumatic.   Right Ear: External ear normal.   Left Ear: External ear normal.   Nose: Rhinorrhea present.   Mouth/Throat: Uvula is midline and oropharynx is clear and moist. No oropharyngeal exudate.   Eyes: Conjunctivae and EOM are normal. Pupils are equal, round, and reactive to light. Right eye exhibits no discharge. Left eye exhibits no discharge.   Neck: Normal range of motion.   Cardiovascular: Normal rate and regular rhythm.    Murmur heard.   Systolic murmur is present with a grade of 3/6   Pulmonary/Chest: Effort normal and breath sounds normal. No stridor. No respiratory distress.   Lymphadenopathy:     She has no cervical adenopathy.   Neurological: She is alert and oriented to person, place, and time.   Skin: Skin is warm and dry. Capillary refill takes less than 2 seconds. No rash noted. She is not diaphoretic. No erythema. No pallor.   Psychiatric: She has a normal mood and affect. Her behavior is normal. Judgment and thought content normal.   Nursing note and vitals reviewed.      Assessment:       1. Rhinitis, unspecified chronicity, unspecified type        Plan:       Reassurance given  Ewelina was seen today for nasal congestion, cough and otalgia.    Diagnoses and all orders for this visit:    Rhinitis, unspecified chronicity, unspecified type  -     azelastine (ASTELIN) 137 mcg (0.1 %) nasal spray; 1 spray (137 mcg total) by Nasal route 2 (two) times daily.      Patient Instructions     Allergic Rhinitis  Allergic rhinitis is an allergic reaction that affects the nose, and often the eyes. Its often known as nasal allergies. Nasal allergies are often due to things in the environment that are breathed in. Depending what you are sensitive to, nasal allergies may occur only during certain seasons. Or they may occur year round. Common indoor  allergens include house dust mites, mold, cockroaches, and pet dander. Outdoor allergens include pollen from trees, grasses, and weeds.   Symptoms include a drippy, stuffy, and itchy nose. They also include sneezing and red and itchy eyes. You may feel tired more often. Severe allergies may also affect your breathing and trigger a condition called asthma.   Tests can be done to see what allergens are affecting you. You may be referred to an allergy specialist for testing and further evaluation.  Home care  Your healthcare provider may prescribe medicines to help relieve allergy symptoms. These may include oral medicines, nasal sprays, or eye drops.  Ask your provider for advice on how to avoid substances that you are allergic to. Below are a few tips for each type of allergen.  Pet dander:  · Do not have pets with fur and feathers.  · If you can't avoid having a pet, keep it out of your bedroom and off upholstered furniture.  Pollen:  · When pollen counts are high, keep windows of your car and home closed. If possible, use an air conditioner instead.  · Wear a filter mask when mowing or doing yard work.  House dust mites:  · Wash bedding every week in warm water and detergent and dry on a hot setting.  · Cover the mattress, box spring, and pillows with allergy covers.   · If possible, sleep in a room with no carpet, curtains, or upholstered furniture.  Cockroaches:  · Store food in sealed containers.  · Remove garbage from the home promptly.  · Fix water leaks  Mold:  · Keep humidity low by using a dehumidifier or air conditioner. Keep the dehumidifier and air conditioner clean and free of mold.  · Clean moldy areas with bleach and water.  In general:  · Vacuum once or twice a week. If possible, use a vacuum with a high-efficiency particulate air (HEPA) filter.  · Do not smoke. Avoid cigarette smoke. Cigarette smoke is an irritant that can make symptoms worse.  Follow-up care  Follow up as advised by the healthcare  provider or our staff. If you were referred to an allergy specialist, make this appointment promptly.  When to seek medical advice  Call your healthcare provider right away if the following occur:  · Coughing or wheezing  · Fever greater than 100.4°F (38°C)  · Hives (raised red bumps)  · Continuing symptoms, new symptoms, or worsening symptoms  Call 911 right away if you have:  · Trouble breathing  · Severe swelling of the face or severe itching of the eyes or mouth  Date Last Reviewed: 3/1/2017  © 1135-0023 [employer]. [address] [place] [address]. All rights reserved. This information is not intended as a substitute for professional medical care. Always follow your healthcare professional's instructions.

## 2018-02-23 NOTE — PATIENT INSTRUCTIONS

## 2018-02-23 NOTE — ASSESSMENT & PLAN NOTE
BP is poorly controlled despite the use of several antihypertensive drugs.. Too early to see whether doubling irbesartan from 150 to 300 mg qd (first 300 mg dose this Am) will lower her BP significantly. It is noteworthy that there has been a progressive decline in kidney function since 2013; last creatinine 2.8    Continue home BP monitoring  Increase hydralazine from 50 to 100 mg tid  Follow-up with DEJUAN Baron

## 2018-02-23 NOTE — TELEPHONE ENCOUNTER
----- Message from Keila Davis sent at 2/23/2018  8:32 AM CST -----  Contact: pt  Pt has appt on Monday 2/26/18 to see Beatriz Hartmann/Hay for BP issues but she is not feeling well today and wants to come in and see Beatriz. Her BP is 186/88 and she has a headache.    Thank you

## 2018-02-23 NOTE — PROGRESS NOTES
Cardiology Progress Note:    Patient ID:  Ewelina Arnold is a 72 y.o. female who presents for follow-up hypertension    History of Present Illness:  Pt with severe hypertension and a complex cardiovascular hx followed by Beatriz Baron Np.  Pt monitors her BP at home and despite the use of numerous antihypertensive medications her BP remains elevated ranging 150-206 (average 170)/ 65-75 mmHg. Heart rate 65-75/min. Over the past few days her BP has been particularly elevated and she wanted to be seen without waiting for her next appointment with Allyssa Baron. Yesterday irbesartan was increased from 150 to 300 mg qd.    Past Medical History Includes:  HFpEF, EF 55-60%  CKD stage IV  HLD  HTN  Hypothyroidism  PAD  Living donor, kidney in 1985  45 pack year h/o smoking, quit ~[id number]  Moderate aortic stenosis, PABLITO = 0.93 cm2, peak velocity = 3.5 m/s, mean gradient = 28 mmHg.   Pulmonary hypertension, 68 mmHg  CAD based on CT chest in 9/2016  Aortic atherosclerosis  Carotid artery disease, left (1-39% in 2012)    Full Medication List Includes:  Outpatient Encounter Prescriptions as of 2/23/2018   Medication Sig Dispense Refill    acetaminophen (TYLENOL) 500 MG tablet Take 2 tablets (1,000 mg total) by mouth every 6 (six) hours as needed for Pain. As needed for headaches 60 tablet 0    ammonium lactate 12 % Crea Apply 1 application topically 2 (two) times daily as needed. 385 g 2    citalopram (CELEXA) 20 MG tablet Take 1.5 tablets (30 mg total) by mouth once daily. 135 tablet 1    cloNIDine (CATAPRES) 0.2 MG tablet Take 1 tablet (0.2 mg total) by mouth every evening. 90 tablet 3    conjugated estrogens (PREMARIN) vaginal cream Use 1g nightly for two weeks, then 1g twice per week. 30 g 12    cyproheptadine (PERIACTIN) 4 mg tablet Take 1 tablet (4 mg total) by mouth every evening. 30 tablet 1    hydrALAZINE (APRESOLINE) 50 MG tablet Take 2 tablets (100 mg total) by mouth every 8 (eight) hours. 180 tablet 11     hydroxychloroquine (PLAQUENIL) 200 mg tablet TAKE 1 TABLET TWICE DAILY 180 tablet 1    ipratropium (ATROVENT) 0.03 % nasal spray 2 sprays by Nasal route 2 (two) times daily as needed for Rhinitis. 30 mL 0    irbesartan (AVAPRO) 300 MG tablet Take 1 tablet (300 mg total) by mouth once daily. please take at 9AM 90 tablet 3    labetalol (NORMODYNE) 200 MG tablet Take 2 tablets (400 mg total) by mouth every 12 (twelve) hours. 120 tablet 11    latanoprost 0.005 % ophthalmic solution INSTILL 1 DROP INTO BOTH EYES EVERY DAY 3 Bottle 3    levothyroxine (SYNTHROID) 75 MCG tablet TAKE 1 TABLET (75 MCG TOTAL) BY MOUTH BEFORE BREAKFAST. 90 tablet 0    meclizine (ANTIVERT) 12.5 mg tablet Take 1 tablet (12.5 mg total) by mouth 3 (three) times daily as needed. 90 tablet 1    multivitamin capsule Take 1 capsule by mouth once daily.      nitroGLYCERIN (NITROSTAT) 0.4 MG SL tablet Place 1 tablet (0.4 mg total) under the tongue every 5 (five) minutes as needed for Chest pain. 30 tablet 3    omeprazole (PRILOSEC) 40 MG capsule Take 1 capsule (40 mg total) by mouth once daily. 90 capsule 3    rosuvastatin (CRESTOR) 40 MG Tab TAKE 1 TABLET EVERY EVENING 90 tablet 3    TENS units Sravanthi 1 application by Misc.(Non-Drug; Combo Route) route daily as needed (pain). 1 Device 0    torsemide (DEMADEX) 20 MG Tab Take 2 tablets (40 mg total) by mouth once daily. 180 tablet 3    triamcinolone acetonide 0.1% (KENALOG) 0.1 % paste PLACE ONTO TEETH TWICE DAILY 5 g 0    VITAMIN D2 50,000 unit capsule TAKE 1 CAPSULE (50,000 UNITS TOTAL) EVERY 30 DAYS 3 capsule 0    [DISCONTINUED] hydrALAZINE (APRESOLINE) 50 MG tablet Take 1 tablet (50 mg total) by mouth every 8 (eight) hours. 90 tablet 11    aspirin (ECOTRIN) 81 MG EC tablet Take 1 tablet (81 mg total) by mouth once daily. (Patient taking differently: Take 81 mg by mouth. )  0    cholestyramine (QUESTRAN) 4 gram packet Take 1 packet (4 g total) by mouth 3 (three) times daily with meals.  "90 packet 4     No facility-administered encounter medications on file as of 2/23/2018.        Review of Systems:  ROS  Constitution: Positive for weakness and malaise/fatigue. Negative for decreased appetite, diaphoresis, weight gain and weight loss.   Eyes: Negative for visual disturbance.   Cardiovascular: Negative for chest pain, claudication, dyspnea on exertion, irregular heartbeat, leg swelling, near-syncope, orthopnea, palpitations, paroxysmal nocturnal dyspnea and syncope.        Denies chest pressure   Respiratory: Negative for cough, hemoptysis, shortness of breath, sleep disturbances due to breathing and snoring.    Endocrine: Negative for cold intolerance and heat intolerance.   Hematologic/Lymphatic: Negative for bleeding problem. Does not bruise/bleed easily.   Musculoskeletal: Negative for myalgias.   Gastrointestinal: Positive for nausea. Negative for bloating, abdominal pain, anorexia, change in bowel habit, constipation, diarrhea and vomiting.   Neurological: Positive for headaches and loss of balance. Negative for difficulty with concentration, disturbances in coordination, excessive daytime sleepiness, dizziness, light-headedness and numbness.   Psychiatric/Behavioral: The patient does not have insomnia.      Physical Exam:  BP (!) 206/84 (BP Location: Left arm, Patient Position: Sitting, BP Method: Medium (Automatic))   Pulse 73   Ht 5' 5" (1.651 m)   Wt 51.8 kg (114 lb 3.2 oz)   LMP  (LMP Unknown)   BMI 19.00 kg/m²   Physical Exam     Constitutional: She is oriented to person, place, and time. Vital signs are normal. She appears well-developed and well-nourished. She is active. No distress.   HENT:   Head: Normocephalic and atraumatic.   Eyes: Conjunctivae and lids are normal. No scleral icterus.   Neck: Neck supple. Normal carotid pulses, no hepatojugular reflux and no JVD present. Carotid bruit is not present.   Cardiovascular: Normal rate, regular rhythm, S1 normal, S2 normal and " intact distal pulses.  PMI is not displaced.  Exam reveals no gallop and no friction rub.    Murmur heard.   Harsh midsystolic murmur is present with a grade of 3/6  at the upper right sternal border radiating to the neck  Pulses:       Carotid pulses are 2+ on the right side, and 2+ on the left side.       Radial pulses are 2+ on the right side, and 2+ on the left side.        Dorsalis pedis pulses are 2+ on the right side, and 2+ on the left side.        Posterior tibial pulses are 2+ on the right side, and 2+ on the left side.   Pulmonary/Chest: Effort normal and breath sounds normal. No respiratory distress. She has no decreased breath sounds. She has no wheezes. She has no rhonchi. She has no rales. She exhibits no tenderness.   Abdominal: Soft. Normal appearance and bowel sounds are normal. She exhibits no distension, no fluid wave, no abdominal bruit, no ascites and no pulsatile midline mass. There is no hepatosplenomegaly. There is no tenderness.   Musculoskeletal: She exhibits no edema.   Neurological: She is alert and oriented to person, place, and time. Gait normal.   Skin: Skin is warm, dry and intact. No rash noted. She is not diaphoretic. Nails show no clubbing.   Psychiatric: She has a normal mood and affect. Her speech is normal and behavior is normal. Judgment and thought content normal. Cognition and memory are normal.       Blood Tests:  Lab Results   Component Value Date    BNP 1,304 (H) 12/07/2017     (L) 02/16/2018    K 4.5 02/16/2018     02/16/2018    CO2 20 (L) 02/16/2018    BUN 67 (H) 02/16/2018    CREATININE 2.8 (H) 02/16/2018    GLU 72 02/16/2018    HGBA1C 5.7 01/24/2017    MG 2.2 01/26/2018    AST 40 02/07/2018    ALT 21 02/07/2018    ALBUMIN 2.9 (L) 02/07/2018    ALBUMIN 2.9 (L) 02/07/2018    PROT 7.0 02/07/2018    BILITOT 0.2 02/07/2018    WBC 3.61 (L) 01/26/2018    HGB 8.8 (L) 01/26/2018    HCT 28.0 (L) 01/26/2018    MCV 80 (L) 01/26/2018     01/26/2018    INR 1.0  11/27/2017    TSH 1.498 01/14/2018       Lab Results   Component Value Date    CHOL 108 (L) 02/07/2018    HDL 44 02/07/2018    TRIG 46 02/07/2018       Lab Results   Component Value Date    LDLCALC 54.8 (L) 02/07/2018       Urine Tests:  Lab Results   Component Value Date    COLORU Yellow 01/19/2018    COLORU Yellow 01/19/2018    APPEARANCEUA Hazy (A) 01/19/2018    APPEARANCEUA Hazy (A) 01/19/2018    PHUR 5.0 01/19/2018    PHUR 5.0 01/19/2018    SPECGRAV 1.010 01/19/2018    SPECGRAV 1.010 01/19/2018    PROTEINUA 2+ (A) 01/19/2018    PROTEINUA 2+ (A) 01/19/2018    GLUCUA Negative 01/19/2018    GLUCUA Negative 01/19/2018    KETONESU Negative 01/19/2018    KETONESU Negative 01/19/2018    BILIRUBINUA Negative 01/19/2018    BILIRUBINUA Negative 01/19/2018    OCCULTUA Negative 01/19/2018    OCCULTUA Negative 01/19/2018    NITRITE Negative 01/19/2018    NITRITE Negative 01/19/2018    UROBILINOGEN Negative 01/19/2018    UROBILINOGEN Negative 01/19/2018    LEUKOCYTESUR Negative 01/19/2018    LEUKOCYTESUR Negative 01/19/2018    PROTEINURINE 131 (H) 01/19/2018    PROTEINURINE 78 (H) 04/27/2017    CREATRANDUR 107.0 01/19/2018    UTPCR 1.22 (H) 01/19/2018         ECG (14-JAN-2018)  Sinus bradycardia at 53/min  Nonspecific T wave abnormality  Abnormal ECG  When compared with ECG of 20-DEC-2017 06:22,  lateral T wave inversion is not present        Echocardiogram (11/27/2017)    TEST DESCRIPTION   Technical Quality: This is a technically adequate study.     Aorta: The aortic root is normal in size, measuring 2.3 cm at sinotubular junction and 2.7 cm at Sinuses of Valsalva. The proximal ascending aorta is normal in size, measuring 2.7 cm across.     Left Atrium: The left atrial volume index is severely enlarged, measuring 57.31 cc/m2.     Left Ventricle: The left ventricle is normal in size, with an end-diastolic diameter of 4.8 cm, and an end-systolic diameter of 2.4 cm. Wall thickness is mildly increased, with the septum  measuring 1.1 cm and the posterior wall measuring 1.2 cm across.   Relative wall thickness was increased at 0.50, and the LV mass index was increased at 147.6 g/m2 consistent with concentric left ventricular hypertrophy. There are no regional wall motion abnormalities. Left ventricular systolic function appears normal.   Visually estimated ejection fraction is 55-60%. The LV Doppler derived stroke volume equals 84.0 ccs.     Diastolic indices: E wave velocity 1.4 m/s, E/A ratio 1.3,  msec., E/e' ratio(avg) 20. There is pseudonormalization of mitral inflow pattern consistent with significant diastolic dysfunction.     Right Atrium: The right atrium is normal in size, measuring 4.2 cm in length and 2.9 cm in width in the apical view.     Right Ventricle: The right ventricle is normal in size measuring 3.0 cm at the base in the apical right ventricle-focused view. Global right ventricular systolic function appears normal. Tricuspid annular plane systolic excursion (TAPSE) is 2.4 cm.   Tissue Doppler-derived tricuspid annular peak systolic velocity (S prime) is 13.1 cm/s. The estimated PA systolic pressure is 68 mmHg and PA mean pressure is 30 mmHg.     Aortic Valve:  The aortic valve is moderately sclerotic with moderately restricted leaflet mobility. The aortic valve is tri-leaflet in structure. The peak velocity obtained across the aortic valve is 3.5 m/s, which translates to a peak gradient of 49   mmHg. The mean gradient is 28 mmHg. Using a left ventricular outflow tract diameter of 2.0 cm, a left ventricular outflow tract velocity time integral of 27 cm, and a peak instantaneous transvalvular velocity time integral of 90 cm, the calculated aortic   valve area is 0.93 cm2, consistent with moderate aortic stenosis. Additionally, there is trivial to mild aortic regurgitation.     Mitral Valve:  The mitral valve is normal in structure with normal leaflet mobility. There is mild mitral regurgitation.      Tricuspid Valve:  The tricuspid valve is normal in structure with normal leaflet mobility. There is mild tricuspid regurgitation.     Pulmonary Valve:  The pulmonic valve is normal in structure with normal leaflet mobility. There is mild pulmonic regurgitation.     Pericardium: There is evidence of a small circumferential pericardial effusion.     IVC: IVC is normal in size and collapses > 50% with a sniff, suggesting normal right atrial pressure of 3 mmHg.     Intracavitary: There is no evidence of intracavity mass, thrombi, or vegetation.     Other: There is a left pleural effusion present.     CONCLUSIONS     1 - Normal left ventricular systolic function (EF 55-60%).     2 - Impaired LV relaxation, elevated LAP (grade 2 diastolic dysfunction).     3 - Normal right ventricular systolic function .     4 - Pulmonary hypertension. The estimated PA systolic pressure is 68 mmHg.     5 - Trivial to mild aortic regurgitation.     6 - Severe left atrial enlargement.     7 - No wall motion abnormalities.     8 - Mild mitral regurgitation.     9 - Mild tricuspid regurgitation.     10 - Mild pulmonic regurgitation.     11 - Small pericardial effusion.     12 - Left pleural effusion.     13 - Concentric hypertrophy.     14 - Moderate aortic stenosis, PABLITO = 0.93 cm2, peak velocity = 3.5 m/s, mean gradient = 28 mmHg.     This document has been electronically    SIGNED BY: lEham Wyman MD         [facility] (12/20/2017)    A. Indication/Pre-Operative Diagnosis     The patient is a 71 year old female that was referred for catheterization by Dr. Elham Wyman for Heart failure with preserved EF and moderate AS..     B. Summary/Post-Operative Diagnosis       Heart failure with preserved EF.    Moderate AS.    Severe HTN and probably diastolic HF.    Severe CKD with Cr 2.4 (s/p kidney donation).    RCA non-dominant so not studied.    Moderate left dominant LCX stenosis.  Normal large LAD.    Contrast used:  10cc.      I have  reviewed the following:     Details / Date    []   Labs     []   Imaging     []   Cardiology Procedures     []   Other      Assessment and Plan:     1. Hypertensive kidney disease with chronic kidney disease stage III         Hypertensive kidney disease with chronic kidney disease stage III  BP is poorly controlled despite the use of several antihypertensive drugs.. Too early to see whether doubling irbesartan from 150 to 300 mg qd (first 300 mg dose this Am) will lower her BP significantly. It is noteworthy that there has been a progressive decline in kidney function since 2013; last creatinine 2.8    Continue home BP monitoring  Increase hydralazine from 50 to 100 mg tid  Follow-up with DEJUAN Baron        Follow-up in about 10 days (around 3/5/2018).      Swapnil Gallegos MD

## 2018-02-26 ENCOUNTER — OFFICE VISIT (OUTPATIENT)
Dept: CARDIOLOGY | Facility: CLINIC | Age: 73
End: 2018-02-26
Payer: MEDICARE

## 2018-02-26 ENCOUNTER — PATIENT MESSAGE (OUTPATIENT)
Dept: ADMINISTRATIVE | Facility: OTHER | Age: 73
End: 2018-02-26

## 2018-02-26 VITALS
OXYGEN SATURATION: 100 % | SYSTOLIC BLOOD PRESSURE: 170 MMHG | HEIGHT: 65 IN | WEIGHT: 115.94 LBS | DIASTOLIC BLOOD PRESSURE: 70 MMHG | BODY MASS INDEX: 19.32 KG/M2 | HEART RATE: 67 BPM

## 2018-02-26 DIAGNOSIS — R73.02 IMPAIRED GLUCOSE TOLERANCE: ICD-10-CM

## 2018-02-26 DIAGNOSIS — I1A.0 RESISTANT HYPERTENSION: Chronic | ICD-10-CM

## 2018-02-26 DIAGNOSIS — I25.84 CORONARY ARTERY DISEASE DUE TO CALCIFIED CORONARY LESION: Chronic | ICD-10-CM

## 2018-02-26 DIAGNOSIS — E46 PROTEIN-CALORIE MALNUTRITION, UNSPECIFIED SEVERITY: Primary | ICD-10-CM

## 2018-02-26 DIAGNOSIS — I25.10 CORONARY ARTERY DISEASE DUE TO CALCIFIED CORONARY LESION: Chronic | ICD-10-CM

## 2018-02-26 DIAGNOSIS — Z90.5 SOLITARY KIDNEY, ACQUIRED: ICD-10-CM

## 2018-02-26 DIAGNOSIS — N18.4 CHRONIC KIDNEY DISEASE, STAGE IV (SEVERE): ICD-10-CM

## 2018-02-26 PROCEDURE — 3008F BODY MASS INDEX DOCD: CPT | Mod: S$GLB,,, | Performed by: NURSE PRACTITIONER

## 2018-02-26 PROCEDURE — 1159F MED LIST DOCD IN RCRD: CPT | Mod: S$GLB,,, | Performed by: NURSE PRACTITIONER

## 2018-02-26 PROCEDURE — 99499 UNLISTED E&M SERVICE: CPT | Mod: S$GLB,,, | Performed by: NURSE PRACTITIONER

## 2018-02-26 PROCEDURE — 99214 OFFICE O/P EST MOD 30 MIN: CPT | Mod: S$GLB,,, | Performed by: NURSE PRACTITIONER

## 2018-02-26 PROCEDURE — 1126F AMNT PAIN NOTED NONE PRSNT: CPT | Mod: S$GLB,,, | Performed by: NURSE PRACTITIONER

## 2018-02-26 PROCEDURE — 99999 PR PBB SHADOW E&M-EST. PATIENT-LVL III: CPT | Mod: PBBFAC,,, | Performed by: NURSE PRACTITIONER

## 2018-02-26 RX ORDER — CLONIDINE HYDROCHLORIDE 0.2 MG/1
0.2 TABLET ORAL 2 TIMES DAILY
Qty: 90 TABLET | Refills: 3 | Status: SHIPPED | OUTPATIENT
Start: 2018-02-26 | End: 2018-02-27 | Stop reason: SDUPTHER

## 2018-02-26 NOTE — PATIENT INSTRUCTIONS
Increase clonidine to 1/2 tablet in the morning and 1 tablet at night for the next week.  Then increase the clonidine to 1 tablet in the morning and 1 tablet at night.

## 2018-02-26 NOTE — PROGRESS NOTES
Ms. Arnold is a patient of Dr. Wyman and was last seen in [facility] 2/23/2018.    Subjective:   Patient ID:  Ewelina Arnold is a 72 y.o. female who presents for follow-up of Edema (both legs and feet) and Hypertension    Problems:   HFpEF, EF 55-60%  CKD stage IV  HLD  HTN  Hypothyroidism  PAD  Living donor, kidney in 1985  45 pack year h/o smoking, quit ~[id number]  Moderate aortic stenosis, PABLITO = 0.93 cm2, peak velocity = 3.5 m/s, mean gradient = 28 mmHg.   Pulmonary hypertension, 68 mmHg  CAD based on CT chest in 9/2016  Aortic atherosclerosis  Carotid artery disease, left (1-39% in 2012)    HPI  Ms. Arnold is in clinic today for continued HTN. Reports having a 5 pound weight gain and SOB over the last week.  She was seen by Dr. Sr and he increased her hydralazine to 100mg TID.  States that her BP has not had any change.  BP log shows BPs 170-200 systolic.  Reports continued headaches.  States that she has adjusted to the clonidine 1/2 tablet at night.      Review of Systems   Constitution: Negative for decreased appetite, diaphoresis, weakness, malaise/fatigue, weight gain and weight loss.   Eyes: Negative for visual disturbance.   Cardiovascular: Negative for chest pain, claudication, dyspnea on exertion, irregular heartbeat, leg swelling, near-syncope, orthopnea, palpitations, paroxysmal nocturnal dyspnea and syncope.        Denies chest pressure   Respiratory: Negative for cough, hemoptysis, shortness of breath, sleep disturbances due to breathing and snoring.    Endocrine: Negative for cold intolerance and heat intolerance.   Hematologic/Lymphatic: Negative for bleeding problem. Does not bruise/bleed easily.   Musculoskeletal: Negative for myalgias.   Gastrointestinal: Negative for bloating, abdominal pain, anorexia, change in bowel habit, constipation, diarrhea, nausea and vomiting.   Neurological: Positive for headaches. Negative for difficulty with concentration, disturbances in coordination,  excessive daytime sleepiness, dizziness, light-headedness, loss of balance and numbness.   Psychiatric/Behavioral: The patient does not have insomnia.      Allergies and current medications updated and reviewed:  Review of patient's allergies indicates:   Allergen Reactions    Ace inhibitors      Other reaction(s): Lips Swelling    Vioxx [rofecoxib]      Other reaction(s): lips swelling    Bactrim [sulfamethoxazole-trimethoprim]      Pt would like this medication to be added due to elevation of creatinine with single kidney.    Norvasc [amlodipine]      Not tolerated    Procardia [nifedipine] Nausea Only and Edema     Feet swelling and nausea, now reports that this has happened in the past and required lasix    Arthrotec 50 [diclofenac-misoprostol]      Other reaction(s): Unknown    Bentyl [dicyclomine]      Not effective    Doxycycline      nausea    Imdur [isosorbide mononitrate] Nausea Only    Lomotil [diphenoxylate-atropine]      Stomach cramps, pt vomitted    Lozol [indapamide] Rash    Tramadol Nausea Only     Current Outpatient Prescriptions   Medication Sig    acetaminophen (TYLENOL) 500 MG tablet Take 2 tablets (1,000 mg total) by mouth every 6 (six) hours as needed for Pain. As needed for headaches    ammonium lactate 12 % Crea Apply 1 application topically 2 (two) times daily as needed.    aspirin (ECOTRIN) 81 MG EC tablet Take 1 tablet (81 mg total) by mouth once daily. (Patient taking differently: Take 81 mg by mouth. )    azelastine (ASTELIN) 137 mcg (0.1 %) nasal spray 1 spray (137 mcg total) by Nasal route 2 (two) times daily.    cholestyramine (QUESTRAN) 4 gram packet Take 1 packet (4 g total) by mouth 3 (three) times daily with meals.    citalopram (CELEXA) 20 MG tablet Take 1.5 tablets (30 mg total) by mouth once daily.    cloNIDine (CATAPRES) 0.2 MG tablet Take 1 tablet (0.2 mg total) by mouth daily.    conjugated estrogens (PREMARIN) vaginal cream Use 1g nightly for two weeks,  "then 1g twice per week.    cyproheptadine (PERIACTIN) 4 mg tablet Take 1 tablet (4 mg total) by mouth every evening.    hydrALAZINE (APRESOLINE) 50 MG tablet Take 2 tablets (100 mg total) by mouth every 8 (eight) hours.    hydroxychloroquine (PLAQUENIL) 200 mg tablet TAKE 1 TABLET TWICE DAILY    ipratropium (ATROVENT) 0.03 % nasal spray 2 sprays by Nasal route 2 (two) times daily as needed for Rhinitis.    irbesartan (AVAPRO) 300 MG tablet Take 1 tablet (300 mg total) by mouth once daily. please take at 9AM    labetalol (NORMODYNE) 200 MG tablet Take 2 tablets (400 mg total) by mouth every 12 (twelve) hours.    latanoprost 0.005 % ophthalmic solution INSTILL 1 DROP INTO BOTH EYES EVERY DAY    levothyroxine (SYNTHROID) 75 MCG tablet TAKE 1 TABLET (75 MCG TOTAL) BY MOUTH BEFORE BREAKFAST.    meclizine (ANTIVERT) 12.5 mg tablet Take 1 tablet (12.5 mg total) by mouth 3 (three) times daily as needed.    multivitamin capsule Take 1 capsule by mouth once daily.    nitroGLYCERIN (NITROSTAT) 0.4 MG SL tablet Place 1 tablet (0.4 mg total) under the tongue every 5 (five) minutes as needed for Chest pain.    omeprazole (PRILOSEC) 40 MG capsule Take 1 capsule (40 mg total) by mouth once daily.    rosuvastatin (CRESTOR) 40 MG Tab TAKE 1 TABLET EVERY EVENING    TENS units Sravanthi 1 application by Misc.(Non-Drug; Combo Route) route daily as needed (pain).    torsemide (DEMADEX) 20 MG Tab Take 2 tablets (40 mg total) by mouth once daily.    triamcinolone acetonide 0.1% (KENALOG) 0.1 % paste PLACE ONTO TEETH TWICE DAILY    VITAMIN D2 50,000 unit capsule TAKE 1 CAPSULE (50,000 UNITS TOTAL) EVERY 30 DAYS     No current facility-administered medications for this visit.      Objective:     Right Arm BP - Sittin/69 (18 1132)  Left Arm BP - Sittin/70 (18 1132)    BP (!) 170/70 (BP Location: Left arm, Patient Position: Sitting, BP Method: Large (Automatic))   Pulse 67   Ht 5' 5" (1.651 m)   Wt 52.6 " kg (115 lb 15.4 oz)   LMP  (LMP Unknown)   SpO2 100%   BMI 19.30 kg/m²       Physical Exam   Constitutional: She is oriented to person, place, and time. Vital signs are normal. She appears well-developed and well-nourished. She is active. No distress.   HENT:   Head: Normocephalic and atraumatic.   Eyes: Conjunctivae and lids are normal. No scleral icterus.   Neck: Neck supple. Normal carotid pulses, no hepatojugular reflux and no JVD present. Carotid bruit is not present.   Cardiovascular: Normal rate, regular rhythm, S1 normal, S2 normal and intact distal pulses.  PMI is not displaced.  Exam reveals no gallop and no friction rub.    No murmur heard.  Pulses:       Carotid pulses are 2+ on the right side, and 2+ on the left side.       Radial pulses are 2+ on the right side, and 2+ on the left side.        Dorsalis pedis pulses are 2+ on the right side, and 2+ on the left side.        Posterior tibial pulses are 1+ on the right side, and 1+ on the left side.   Pulmonary/Chest: Effort normal and breath sounds normal. No respiratory distress. She has no decreased breath sounds. She has no wheezes. She has no rhonchi. She has no rales. She exhibits no tenderness.   Abdominal: Soft. Normal appearance and bowel sounds are normal. She exhibits no distension, no fluid wave, no abdominal bruit, no ascites and no pulsatile midline mass. There is no hepatosplenomegaly. There is no tenderness.   Musculoskeletal: She exhibits no edema.   Neurological: She is alert and oriented to person, place, and time. Gait normal.   Skin: Skin is warm, dry and intact. No rash noted. She is not diaphoretic. Nails show no clubbing.   Psychiatric: She has a normal mood and affect. Her speech is normal and behavior is normal. Judgment and thought content normal. Cognition and memory are normal.   Nursing note and vitals reviewed.      Chemistry        Component Value Date/Time     (L) 02/16/2018 0718    K 4.5 02/16/2018 0718      02/16/2018 0718    CO2 20 (L) 02/16/2018 0718    BUN 67 (H) 02/16/2018 0718    CREATININE 2.8 (H) 02/16/2018 0718    GLU 72 02/16/2018 0718        Component Value Date/Time    CALCIUM 9.2 02/16/2018 0718    ALKPHOS 82 02/07/2018 0707    AST 40 02/07/2018 0707    ALT 21 02/07/2018 0707    BILITOT 0.2 02/07/2018 0707    ESTGFRAFRICA 18.7 (A) 02/16/2018 0718    EGFRNONAA 16.2 (A) 02/16/2018 0718            Recent Labs  Lab 11/17/17  2118  11/28/17  0433  12/07/17  1048  01/14/18  0700  01/26/18  0411 02/07/18  0707   WHITE BLOOD CELL COUNT 3.61 L  < > 4.68  < > 3.40 L  < > 3.82 L  < > 3.61 L  --    HEMOGLOBIN 9.7 L  < > 9.1 L  < > 9.5 L  < > 10.1 L  < > 8.8 L  --    HEMATOCRIT 30.9 L  < > 28.5 L  < > 30.8 L  < > 32.3 L  < > 28.0 L  --    MCV 80 L  < > 81 L  < > 81 L  < > 82  < > 80 L  --    PLATELETS 217  < > 215  < > 228  < > 171  < > 173  --    BNP 1,073 H  --  2,331 H  --  1,304 H  --   --   --   --   --    TSH  --   --  2.453  --   --   --  1.498  --   --   --    CHOLESTEROL  --   --   --   --   --   --   --   --   --  108 L   HDL  --   --   --   --   --   --   --   --   --  44   LDL CHOLESTEROL  --   --   --   --   --   --   --   --   --  54.8 L   TRIGLYCERIDES  --   --   --   --   --   --   --   --   --  46   HDL/CHOLESTEROL RATIO  --   --   --   --   --   --   --   --   --  40.7   < > = values in this interval not displayed.      Recent Labs  Lab 11/27/17  1200   INR 1.0        Test(s) Reviewed  I have reviewed the following in detail:  [] Stress test   [] Angiography   [x] Echocardiogram   [x] Labs   [] Other:         Assessment/Plan:     Resistant hypertension  Comments:  BP not at goal <130/80. Continue clonidine because benefits outweight the risks of SE. SE will hopefully improve overtime. Will have patient increase dose to 1 full tablet at night for a total of 0.2mg and 1/2 tablet in the morning (0.1mg) for a week.  Then increase the clonidine to 0.2mg BID.      Orders:  -     cloNIDine (CATAPRES) 0.2 MG  tablet; Take 1 tablet (0.2 mg total) by mouth 2 (two) times daily.  Dispense: 90 tablet; Refill: 3  -     Hypertension Digital Medicine (HDMP) Enrollment Order    Malnutrition due to poor protein and calorie intake  -Albumin 2.9  Encouraged to drink ensure/boost in between meals to help increase protein and calorie intake.    Follow-up in about 3 weeks (around 3/19/2018).

## 2018-02-27 ENCOUNTER — OFFICE VISIT (OUTPATIENT)
Dept: ENDOCRINOLOGY | Facility: CLINIC | Age: 73
End: 2018-02-27
Payer: MEDICARE

## 2018-02-27 VITALS
BODY MASS INDEX: 19.87 KG/M2 | HEART RATE: 66 BPM | WEIGHT: 119.25 LBS | SYSTOLIC BLOOD PRESSURE: 160 MMHG | DIASTOLIC BLOOD PRESSURE: 66 MMHG | HEIGHT: 65 IN

## 2018-02-27 DIAGNOSIS — N18.4 CHRONIC KIDNEY DISEASE, STAGE IV (SEVERE): ICD-10-CM

## 2018-02-27 DIAGNOSIS — I1A.0 RESISTANT HYPERTENSION: Chronic | ICD-10-CM

## 2018-02-27 DIAGNOSIS — I1A.0 RESISTANT HYPERTENSION: Primary | ICD-10-CM

## 2018-02-27 PROCEDURE — 99499 UNLISTED E&M SERVICE: CPT | Mod: S$GLB,,, | Performed by: INTERNAL MEDICINE

## 2018-02-27 PROCEDURE — 99999 PR PBB SHADOW E&M-EST. PATIENT-LVL III: CPT | Mod: PBBFAC,,, | Performed by: INTERNAL MEDICINE

## 2018-02-27 PROCEDURE — 1159F MED LIST DOCD IN RCRD: CPT | Mod: S$GLB,,, | Performed by: INTERNAL MEDICINE

## 2018-02-27 PROCEDURE — 99214 OFFICE O/P EST MOD 30 MIN: CPT | Mod: S$GLB,,, | Performed by: INTERNAL MEDICINE

## 2018-02-27 PROCEDURE — 3008F BODY MASS INDEX DOCD: CPT | Mod: S$GLB,,, | Performed by: INTERNAL MEDICINE

## 2018-02-27 PROCEDURE — 1126F AMNT PAIN NOTED NONE PRSNT: CPT | Mod: S$GLB,,, | Performed by: INTERNAL MEDICINE

## 2018-02-27 NOTE — PROGRESS NOTES
Subjective:     Patient ID: Ewelina Arnold is a 72 y.o. female.    Chief Complaint: Hypothyroidism    HPI:   Ms. Arnold is a 72 y.o. female who is here for a follow-up visit for evaluation hypertension and hyperaldosteronism.   She was in the hospital for twelve days for titration of medications.     Diagnosed with hypertension ten years ago at least. Blood pressure was fairly well controlled until one year ago.   Does not associate change in blood pressure with any life stressor, change in medication or medical diagnosis. Of note, her creat increased about four to six months ago.     1985 donated a kidney to her brother.   Her current creat is 2.6 - 3.3 mg/dl, her CKD stage is 4.  She is followed by Dr. Epperson and previously by Dr. Ruffin.     Hypothyroidism s/p left thyroidectomy one year ago. She is currently on levothyroxine 75 mcg daily. Takes it on an empty stomach, with water without other pills, vitamin or food.     Denies NSAIDs, low salt diet.     Regimen:   Clonidine 0.2 mg one tablet twice a day  Hydralazine 50 mg two tablets three times a day  Irbesartan 300 mg daily   Torsemide 20 mg daily   Labetalol 200 mg one tablet twice a day  D/C'd Spironolactone 1/25/2018 patient discharged.     Cyproheptadine for appetite  Hydroxychloroquine daily   Not taking omeprazole  As needed vaginal premarin cream, cholestyramine, meclizine   Vitamin D2 once monthly     Review of Systems   Constitutional: Negative for chills and fever.        Still has elevated blood pressure - associates with headaches  Also trying to adjust to clonidine which she restarted in the hospital.      HENT: Negative for congestion and sinus pressure.    Eyes: Negative for visual disturbance.   Respiratory: Negative for chest tightness and shortness of breath.    Cardiovascular: Negative for chest pain, palpitations and leg swelling.   Gastrointestinal: Negative for abdominal pain and vomiting.   Genitourinary: Negative for dysuria.  "  Musculoskeletal: Negative for arthralgias.   Skin: Negative for rash.   Neurological: Negative for weakness.   Hematological: Does not bruise/bleed easily.   Psychiatric/Behavioral: Negative for sleep disturbance.     Objective:     Physical Exam   Constitutional: She is oriented to person, place, and time. She appears well-developed and well-nourished. No distress.   HENT:   Head: Normocephalic and atraumatic.   Nose: Nose normal.   Mouth/Throat: Oropharynx is clear and moist. No oropharyngeal exudate.   Eyes: Conjunctivae and EOM are normal. Pupils are equal, round, and reactive to light. No scleral icterus.   Neck: Normal range of motion. Neck supple. No thyromegaly (  absent left thyroid lobe. right is normal in size) present.   Cardiovascular: Normal rate, regular rhythm and intact distal pulses.    Murmur heard.  Pulmonary/Chest: Effort normal and breath sounds normal.   Abdominal: Soft. Bowel sounds are normal. She exhibits no distension.   Musculoskeletal: Normal range of motion. She exhibits edema. She exhibits no tenderness.   Lymphadenopathy:     She has no cervical adenopathy.   Neurological: She is alert and oriented to person, place, and time. She has normal reflexes.   Skin: Skin is warm and dry.   Psychiatric: She has a normal mood and affect.       Vitals:    02/27/18 1332   BP: (!) 160/66   BP Location: Right arm   Patient Position: Sitting   BP Method: Medium (Manual)   Pulse: 66   Weight: 54.1 kg (119 lb 4.3 oz)   Height: 5' 5" (1.651 m)     Results for DARINEL LOMAS (MRN [id number]) as of 2/27/2018 14:29   Ref. Range 2/16/2018 07:18   Creatinine Latest Ref Range: 0.5 - 1.4 mg/dL 2.8 (H)   eGFR if non African American Latest Ref Range: >60 mL/min/1.73 m^2 16.2 (A)   eGFR if African American Latest Ref Range: >60 mL/min/1.73 m^2 18.7 (A)   Results for DARINEL LOMAS (MRN [id number]) as of 2/27/2018 14:29   Ref. Range 10/30/2017 16:21 11/14/2017 12:05   Creatinine Latest Ref Range: 0.5 - 1.4 mg/dL 2.0 " (H) 1.8 (H)   eGFR if non African American Latest Ref Range: >60 mL/min/1.73 m^2 24.6 (A) 27.9 (A)   eGFR if African American Latest Ref Range: >60 mL/min/1.73 m^2 28.3 (A) 32.2 (A)     Results for DARINEL LOMAS (MRN [id number]) as of 2/27/2018 14:29   Ref. Range 1/19/2018 22:11 1/19/2018 22:23 1/26/2018 08:27   Aldosterone Latest Units: ng/dL 20.5 31.6 17.3   Results for DARINEL LOMAS (MRN [id number]) as of 2/27/2018 14:29   Ref. Range 1/19/2018 22:12 1/26/2018 08:27   Renin Activity Latest Units: ng/mL/h <0.6 <0.6     US 2017  Stable right lower lobe nodule measuring 1.1 cm which does not meet criteria for FNA.  Status post left thyroid lobectomy.    Assessment/Plan:     Ms. Lomas is a 72 year old woman who is here for consultation and management for primary hyperaldosteronism based on blood work during recent admission which demonstrates suppressed renin with elevated aldosterone and normokalemia.   Other work up included pheochromocytoma which was negative.     Has eGFR that has worsened recently.    Would consider restarting spironolactone however due to worsening CKD will discuss with nephrology regarding start of low dose spironolactone.    Would not consider imaging at this time to avoid nephrotoxicity. Review of CT of abdomen (NOT adrenal protocol) does not demonstrate adrenal adenoma.     1. Resistant hypertension    - ALDOSTERONE; Future  - Renal function panel; Future  - RENIN; Future    2. Chronic kidney disease, stage IV (severe)    - ALDOSTERONE; Future  - Renal function panel; Future  - RENIN; Future

## 2018-02-28 ENCOUNTER — OFFICE VISIT (OUTPATIENT)
Dept: OPHTHALMOLOGY | Facility: CLINIC | Age: 73
End: 2018-02-28
Payer: MEDICARE

## 2018-02-28 ENCOUNTER — PATIENT OUTREACH (OUTPATIENT)
Dept: OTHER | Facility: OTHER | Age: 73
End: 2018-02-28

## 2018-02-28 ENCOUNTER — CLINICAL SUPPORT (OUTPATIENT)
Dept: OPHTHALMOLOGY | Facility: CLINIC | Age: 73
End: 2018-02-28
Payer: MEDICARE

## 2018-02-28 ENCOUNTER — TELEPHONE (OUTPATIENT)
Dept: CARDIOLOGY | Facility: CLINIC | Age: 73
End: 2018-02-28

## 2018-02-28 DIAGNOSIS — H40.243 RESIDUAL STAGE OF ANGLE-CLOSURE GLAUCOMA OF BOTH EYES: Primary | ICD-10-CM

## 2018-02-28 DIAGNOSIS — H53.433 ARCUATE SCOTOMA OF BOTH EYES: ICD-10-CM

## 2018-02-28 DIAGNOSIS — I1A.0 RESISTANT HYPERTENSION: Primary | ICD-10-CM

## 2018-02-28 DIAGNOSIS — H25.13 NUCLEAR SCLEROSIS OF BOTH EYES: ICD-10-CM

## 2018-02-28 DIAGNOSIS — H53.8 POSITIVE DYSPHOTOPSIA: ICD-10-CM

## 2018-02-28 DIAGNOSIS — H40.243 RESIDUAL STAGE OF ANGLE-CLOSURE GLAUCOMA OF BOTH EYES: ICD-10-CM

## 2018-02-28 DIAGNOSIS — H04.123 INSUFFICIENCY OF TEAR FILM OF BOTH EYES: ICD-10-CM

## 2018-02-28 DIAGNOSIS — H59.099 POSITIVE DYSPHOTOPSIA: ICD-10-CM

## 2018-02-28 PROBLEM — N17.9 ACUTE ON CHRONIC KIDNEY FAILURE: Status: RESOLVED | Noted: 2017-11-18 | Resolved: 2018-02-28

## 2018-02-28 PROBLEM — R44.1 EPISODES OF FORMED VISUAL HALLUCINATIONS: Status: ACTIVE | Noted: 2018-02-28

## 2018-02-28 PROBLEM — N18.9 ACUTE ON CHRONIC KIDNEY FAILURE: Status: RESOLVED | Noted: 2017-11-18 | Resolved: 2018-02-28

## 2018-02-28 PROCEDURE — 99999 PR PBB SHADOW E&M-EST. PATIENT-LVL II: CPT | Mod: PBBFAC,,, | Performed by: OPHTHALMOLOGY

## 2018-02-28 PROCEDURE — 99499 UNLISTED E&M SERVICE: CPT | Mod: S$GLB,,, | Performed by: OPHTHALMOLOGY

## 2018-02-28 PROCEDURE — 92014 COMPRE OPH EXAM EST PT 1/>: CPT | Mod: S$GLB,,, | Performed by: OPHTHALMOLOGY

## 2018-02-28 PROCEDURE — 92083 EXTENDED VISUAL FIELD XM: CPT | Mod: S$GLB,,, | Performed by: OPHTHALMOLOGY

## 2018-02-28 RX ORDER — CLONIDINE HYDROCHLORIDE 0.2 MG/1
0.2 TABLET ORAL 2 TIMES DAILY
Qty: 180 TABLET | Refills: 3 | Status: ON HOLD | OUTPATIENT
Start: 2018-02-28 | End: 2018-03-28 | Stop reason: HOSPADM

## 2018-02-28 NOTE — Clinical Note
Kalie: Please evaluate Visual Hallucinations --> apparent during awaking at night // lasting 3-5 seconds --> scares patient Potted plants / trees at night ? Medication interaction  Short duration Not typical for migraine activity

## 2018-02-28 NOTE — TELEPHONE ENCOUNTER
----- Message from Celina Figueroa MA sent at 2/28/2018  3:21 PM CST -----  Contact: self  Pt. saw Beatriz on 2/26/18 for swelling of feet and legs.She has sob, now getting worse for 1-2 weeks ? and feet and legs swelling,had this on Mon.,no chest pain and feel fatigue Please call.

## 2018-02-28 NOTE — TELEPHONE ENCOUNTER
Spoke with Ms Shahbaz - pt had appt Monday. Spoke with the pt says her weight is 115, says she is feeling fatigued, and that sob is past moderate, and legs and feet are more swollen.  Reviewed Cardiac med list - has been taking only one Demedex 20 mg tablet daily - instructed to take two 20 mg tablets daily per epic med list, and the pt verbalized understanding. Spoke with Ms Shahbaz and informed her that the pt was taking only one Demadex dailiy.

## 2018-02-28 NOTE — PROGRESS NOTES
Last 5 Patient Entered Readings                                      Current 30 Day Average: 157/75     Recent Readings 2/28/2018 2/28/2018 2/27/2018 2/27/2018 2/27/2018    SBP (mmHg) 144 162 159 171 171    DBP (mmHg) 69 71 72 79 79    Pulse 67 67 71 67 67        Ms. Ewelina Arnold is a 72 y.o. female who is newly enrolled in the [facility] Medicine Hypertension [facility].     The following information was reviewed/updated:  Preferred pharmacy   [facility] - [place], [place] - [address]  [address] [address]  Phone: [phone] Fax: [phone]    [facility] - [facility] [address]  [address] [place] [address]  Phone: [phone] Fax: [phone]    Patient prefers a 90 days supply  Review of patient's allergies indicates:   Allergen Reactions    Ace inhibitors Swelling     Lips Swelling    Vioxx [rofecoxib] Swelling      lips swelling    Bactrim [sulfamethoxazole-trimethoprim]      Pt would like this medication to be added due to elevation of creatinine with single kidney.    Procardia [nifedipine] Nausea Only and Edema     Feet swelling and nausea, now reports that this has happened in the past and required lasix    Arthrotec 50 [diclofenac-misoprostol]      Unknown    Bentyl [dicyclomine]      Not effective    Doxycycline Nausea Only    Imdur [isosorbide mononitrate] Nausea Only    Lomotil [diphenoxylate-atropine] Nausea And Vomiting     Stomach cramps with vomitting    Lozol [indapamide] Rash    Norvasc [amlodipine]      Not tolerated    Tramadol Nausea Only     Current Outpatient Prescriptions on File Prior to Visit   Medication Sig Dispense Refill    acetaminophen (TYLENOL) 500 MG tablet Take 2 tablets (1,000 mg total) by mouth every 6 (six) hours as needed for Pain. As needed for headaches 60 tablet 0    ammonium lactate 12 % Crea Apply 1 application topically 2 (two)  times daily as needed. 385 g 2    aspirin (ECOTRIN) 81 MG EC tablet Take 1 tablet (81 mg total) by mouth once daily. (Patient taking differently: Take 81 mg by mouth. )  0    azelastine (ASTELIN) 137 mcg (0.1 %) nasal spray 1 spray (137 mcg total) by Nasal route 2 (two) times daily. (Patient taking differently: 1 spray by Nasal route as needed. ) 30 mL 1    cholestyramine (QUESTRAN) 4 gram packet Take 1 packet (4 g total) by mouth 3 (three) times daily with meals. 90 packet 4    citalopram (CELEXA) 20 MG tablet Take 1.5 tablets (30 mg total) by mouth once daily. 135 tablet 1    cloNIDine (CATAPRES) 0.2 MG tablet Take 1 tablet (0.2 mg total) by mouth 2 (two) times daily. 180 tablet 3    conjugated estrogens (PREMARIN) vaginal cream Use 1g nightly for two weeks, then 1g twice per week. (Patient taking differently: as needed. Use 1g nightly for two weeks, then 1g twice per week.) 30 g 12    cyproheptadine (PERIACTIN) 4 mg tablet Take 1 tablet (4 mg total) by mouth every evening. 30 tablet 1    hydrALAZINE (APRESOLINE) 50 MG tablet Take 2 tablets (100 mg total) by mouth every 8 (eight) hours. 180 tablet 11    hydroxychloroquine (PLAQUENIL) 200 mg tablet TAKE 1 TABLET TWICE DAILY 180 tablet 1    ipratropium (ATROVENT) 0.03 % nasal spray 2 sprays by Nasal route 2 (two) times daily as needed for Rhinitis. 30 mL 0    irbesartan (AVAPRO) 300 MG tablet Take 1 tablet (300 mg total) by mouth once daily. please take at 9AM 90 tablet 3    labetalol (NORMODYNE) 200 MG tablet Take 2 tablets (400 mg total) by mouth every 12 (twelve) hours. 120 tablet 11    latanoprost 0.005 % ophthalmic solution INSTILL 1 DROP INTO BOTH EYES EVERY DAY 3 Bottle 3    levothyroxine (SYNTHROID) 75 MCG tablet TAKE 1 TABLET (75 MCG TOTAL) BY MOUTH BEFORE BREAKFAST. 90 tablet 0    meclizine (ANTIVERT) 12.5 mg tablet Take 1 tablet (12.5 mg total) by mouth 3 (three) times daily as needed. 90 tablet 1    multivitamin capsule Take 1 capsule by  mouth once daily.      nitroGLYCERIN (NITROSTAT) 0.4 MG SL tablet Place 1 tablet (0.4 mg total) under the tongue every 5 (five) minutes as needed for Chest pain. 30 tablet 3    omeprazole (PRILOSEC) 40 MG capsule Take 1 capsule (40 mg total) by mouth once daily. (Patient taking differently: Take 40 mg by mouth as needed. ) 90 capsule 3    rosuvastatin (CRESTOR) 40 MG Tab TAKE 1 TABLET EVERY EVENING 90 tablet 3    TENS units Sravanthi 1 application by Misc.(Non-Drug; Combo Route) route daily as needed (pain). 1 Device 0    torsemide (DEMADEX) 20 MG Tab Take 2 tablets (40 mg total) by mouth once daily. 180 tablet 3    triamcinolone acetonide 0.1% (KENALOG) 0.1 % paste PLACE ONTO TEETH TWICE DAILY (Patient taking differently: as needed. PLACE ONTO TEETH TWICE DAILY) 5 g 0    VITAMIN D2 50,000 unit capsule TAKE 1 CAPSULE (50,000 UNITS TOTAL) EVERY 30 DAYS 3 capsule 0     No current facility-administered medications on file prior to visit.        IF DEPRESSED    Responses to the depression screening suggest patient is having depressive symptoms. Patient is followed for this and is not interested in referral.     Elham Wyman MD indicated she would like to see patient / have patient  referred to a specialist for further evaluation.     Reviewed non-pharmacologic therapies and impact on BP:    1. Low-sodium diet- 11 mmHg reduction 2-4 weeks. I have reviewed [facility].A.S.[facility] diet sodium restrictions (<2000mg/day). Patient does not add salt to her foods when cooking or after. She chooses low sodium when available. When eating canned foods she rinses her foods.  2. Exercise- 7 mmHg reduction 4-12 weeks. I have recommended patient engage in exercise as tolerated at least 30 minutes 5x per week to improve cardiovascular health.  Patient does not exercise because of her medications she is not feeling well. She was going to the gym three times a week for a work-out class but stopped in January. She does have the gym membership.  3.  Alcohol intake- 3 mmHg reduction 4-12 weeks. I have discussed with patient the maximum recommended number of 1 drink(s) per day for men/women. Patient does not consume alcohol.     Explained that we expect patient to obtain several blood pressures per week at random times of day.   Our goal is to get  BP to consistently below 130/80mmHg and make the process convenient so patient can avoid extra trips to the office. Getting your blood pressure below 130/80mmHg (definition of control) will reduce your risk for heart attack, kidney failure, stroke and death (as well as kidney failure, eye disease, & dementia).     Patient is not meeting the goal already.   When asked what the patient thinks is causing BP to be elevated, she states: no idea.    Instructed patient not to allow anyone else to use phone and BP cuff.   I'm not available for emergencies. [facility] ([phone] or [phone]) or [phone] if needed.     Discussed appropriate BP measuring technique:  Before taking your blood pressure  Find a quiet place. You will need to listen for your heartbeat.  Roll up the sleeve on your left arm or remove any tight-sleeved clothing, if needed. (It's best to take your blood pressure from your left arm if you are right-handed.You can use the other arm if you have been told by your health care provider to do so.)  Rest in a chair next to a table for 5 to 10 minutes. (Your left arm should rest comfortably at heart level.)  Sit up straight with your back against the chair, legs uncrossed and on the ground.  Rest your forearm on the table with the palm of your hand facing up.  You should not talk, read the newspaper, or watch television during this process.      Patient and I agreed that she will continue to monitor blood pressure and sodium intake, and continue to remain adherent to medications.     I will plan to follow-up with the patient in 1 weeks.   Emailed patient link to [facility] HTN webpage and  my contact information in case she has any questions.     Last 5 Patient Entered Readings                                      Current 30 Day Average: 180/81     Recent Readings 3/2/2018 3/2/2018 3/1/2018 3/1/2018 3/1/2018    SBP (mmHg) 199 185 168 180 191    DBP (mmHg) 87 81 76 80 84    Pulse 66 65 66 67 67      73 yo female with a PMH of HTN, depression, EF 55-60%, CKD stage IV, GFR 18.7, Hypothyroidism, PAD, living donor, kidney in 1985 to brother, Moderate aortic stenosis, CAD, and Aortic atherosclerosis. An initial assessment shows compliance might be an issue. She does have a pill container but admits to missing doses. Looking through her chart and history she has taken incorrect doses of medications. She started irbesartan 300mg daily 2/22, increase in hydralazine 2/23, increase in clonidine 2/26.    She does not wear compression stockings and has swelling regularly.    She will begin nifedipine 30mg daily.

## 2018-02-28 NOTE — LETTER
Mila Lazo, PharmD  [address] [place] [address]     Dear Ewelina Arnold,    Welcome to the [facility] Hypertension Digital [facility] Program!         My name is Mila Lazo PharmD and I am your dedicated Digital Medicine clinician.  As an expert in medication management, I will help ensure that the medications you are taking continue to provide you with the intended benefits.      I am Seamus Christianson and I will be your health  for the duration of the program.  My  job is to help you identify lifestyle changes to improve your blood pressure control.  We will talk about nutrition, exercise, and other ways that you may be able to adjust your current habits to better your health. Together, we will work to improve your overall health and encourage you to meet your goals for a healthier lifestyle.    What we expect from YOU:    You will need to take blood pressure readings multiple times a week and no less than one reading per week.   It is important that you take your measurements at different times during the day, when possible.     What you should expect from your Digital Medicine Care Team:   We will provide you with education about high blood pressure, including lifestyle changes that could help you to control your blood pressure.   We will review your weekly readings and provide you with monthly blood pressure progress reports after you have been in the program for more than 30 days.   We will send monthly progress reports on your blood pressure control to your physician so they can follow along with your progress as well.    You will be able to reach me by phone at [phone] or through your [facility] account by clicking my name under Care Team on the right side of the home screen.    I look forward to working with you to achieve your blood pressure goals!    Sincerely,    Mila Lazo PharmD  Your personal clinician    Please visit  [email or web address] to learn more about high blood pressure and what you can do lower your blood pressure.                                                                                           Ewelina Arnold  [address] [place] [id number]

## 2018-03-02 RX ORDER — NIFEDIPINE 30 MG/1
30 TABLET, FILM COATED, EXTENDED RELEASE ORAL DAILY
Qty: 30 TABLET | Refills: 3 | Status: SHIPPED | OUTPATIENT
Start: 2018-03-02 | End: 2018-03-07 | Stop reason: SINTOL

## 2018-03-05 ENCOUNTER — PATIENT OUTREACH (OUTPATIENT)
Dept: OTHER | Facility: OTHER | Age: 73
End: 2018-03-05

## 2018-03-05 DIAGNOSIS — N18.30 HYPERTENSIVE KIDNEY DISEASE WITH CHRONIC KIDNEY DISEASE STAGE III: Primary | ICD-10-CM

## 2018-03-05 DIAGNOSIS — I12.9 HYPERTENSIVE KIDNEY DISEASE WITH CHRONIC KIDNEY DISEASE STAGE III: Primary | ICD-10-CM

## 2018-03-05 NOTE — PROGRESS NOTES
Last 5 Patient Entered Readings                                      Current 30 Day Average: 172/80     Recent Readings 3/4/2018 3/4/2018 3/3/2018 3/3/2018 3/3/2018    SBP (mmHg) 169 140 175 157 145    DBP (mmHg) 82 68 79 74 67    Pulse 70 72 70 67 64        3/5-Patient answered stating she will return call when she is available. Will call again on 3/8.    Patient states she has been taking her readings sitting in bed. Patient states she may have been putting the cuff on too tight. Informed patient of proper BP technique and time.     Hypertension Digital Medicine Program (HDMP): Health  Introduction    Introduced Ms. Ewelina Arnold to HDMP. Discussed health  role and recommended lifestyle modifications.    Diet (i.e. Low sodium, food labels): Patient reports being lactose and tolerant. She states not knowing what to eat. Patient reports consuming a bagel with jelly for breakfast. Patient reports consuming fast food seldom. She states consuming a small salad from [employer]. Patient reports using little dressing. She states having a no sodium seasoning in cabinet but has not used it yet. Patient states having one kidney. She states enjoying sweet potatoes. Patient reports limiting meat. She states she previously consuming deli turkey or rotisserie chickens at [employer]. Patient reports enjoying peanut butter and jelly. She states drinking Silk milk.Patient requested a list of foods she can eat due to her CKD and lactose and tolerance.  Exercise: Patient reports having minimal activity as patient uses a walker. She states having previous success with attending a gym until her health began to deteriorate starting swelling in the legs   Alcohol/Tobacco: Patient reports being a non-smoker and does not drink alcoholic beverages.  Medication Adherence: has been compliant with the medicaiton regimen Patient reports having issues with Clonidine but her and her clinical pharmacist are discussing BP medication  issues.  Other goals:     Reviewed AHA recommendations:  Limit sodium intake to <2000mg/day  Perform 150 minutes of physical activity per week    Patient is aware of the importance of taking daily BP readings at various times of the day  Patient aware that the health  can be used as a resource for lifestyle modifications to help reduce or maintain a healthy BP  Patient is aware of the importance of medication adherence.  [facility]. Patient should call 911 or [facility] on Call if one arises.

## 2018-03-05 NOTE — PROGRESS NOTES
Last 5 Patient Entered Readings                                      Current 30 Day Average: 172/80     Recent Readings 3/4/2018 3/4/2018 3/3/2018 3/3/2018 3/3/2018    SBP (mmHg) 169 140 175 157 145    DBP (mmHg) 82 68 79 74 67    Pulse 70 72 70 67 64        Patient's BP average is above goal of <130/80.     Patient denies s/s of hypotension (lightheadedness, dizziness, nausea, fatigue) associated with low readings. Instructed patient to inform me if this occurs, patient confirms understanding.      Patient denies s/s of hypertension (SOB, CP, severe headaches, changes in vision) associated with high readings. Instructed patient to go to the ED if BP > 180/110 and accompanied by hypertensive s/s, patient confirms understanding.    Will continue to monitor regularly. Will follow up in 2-3 weeks, sooner if BP begins to trend upward or downward.    Patient has my contact information and knows to call with any concerns or clinical changes.     Current HTN regimen:  Hypertension Medications             cloNIDine (CATAPRES) 0.2 MG tablet Take 1 tablet (0.2 mg total) by mouth 2 (two) times daily.    hydrALAZINE (APRESOLINE) 50 MG tablet Take 2 tablets (100 mg total) by mouth every 8 (eight) hours.    irbesartan (AVAPRO) 300 MG tablet Take 1 tablet (300 mg total) by mouth once daily. please take at 9AM    labetalol (NORMODYNE) 200 MG tablet Take 2 tablets (400 mg total) by mouth every 12 (twelve) hours.    NIFEdipine (ADALAT CC) 30 MG TbSR Take 1 tablet (30 mg total) by mouth once daily.    nitroGLYCERIN (NITROSTAT) 0.4 MG SL tablet Place 1 tablet (0.4 mg total) under the tongue every 5 (five) minutes as needed for Chest pain.    torsemide (DEMADEX) 20 MG Tab Take 2 tablets (40 mg total) by mouth once daily.        BP schedule:  7am: hydralazine, labetalol, toresemide  9am: clonidine, irbesartan  3pm: hydralazine  7pm: labetalol  9-10pm: clonidine, hydralzine    Patient called this morning informing me she is feeling  nauseous and she experienced swelling. She is watching her sodium intake despite swelling in her feet. This is occurring because of nifedipine. She will hold nifedipine for three days to see if the nausea and swelling dissipates. If so, I will discontinue the medication and try verapamil or diltiazem.

## 2018-03-07 ENCOUNTER — LAB VISIT (OUTPATIENT)
Dept: LAB | Facility: HOSPITAL | Age: 73
End: 2018-03-07
Attending: INTERNAL MEDICINE
Payer: MEDICARE

## 2018-03-07 ENCOUNTER — OFFICE VISIT (OUTPATIENT)
Dept: NEPHROLOGY | Facility: CLINIC | Age: 73
End: 2018-03-07
Payer: MEDICARE

## 2018-03-07 ENCOUNTER — OUTPATIENT CASE MANAGEMENT (OUTPATIENT)
Dept: ADMINISTRATIVE | Facility: OTHER | Age: 73
End: 2018-03-07

## 2018-03-07 VITALS
WEIGHT: 116.19 LBS | DIASTOLIC BLOOD PRESSURE: 70 MMHG | HEART RATE: 68 BPM | HEIGHT: 65 IN | OXYGEN SATURATION: 98 % | BODY MASS INDEX: 19.36 KG/M2 | SYSTOLIC BLOOD PRESSURE: 170 MMHG

## 2018-03-07 DIAGNOSIS — N18.4 CKD (CHRONIC KIDNEY DISEASE), STAGE IV: Primary | ICD-10-CM

## 2018-03-07 DIAGNOSIS — I1A.0 RESISTANT HYPERTENSION: ICD-10-CM

## 2018-03-07 DIAGNOSIS — N18.4 CHRONIC KIDNEY DISEASE, STAGE IV (SEVERE): ICD-10-CM

## 2018-03-07 DIAGNOSIS — E26.9 HYPERALDOSTERONISM: ICD-10-CM

## 2018-03-07 LAB
ALBUMIN SERPL BCP-MCNC: 2.8 G/DL
ANION GAP SERPL CALC-SCNC: 10 MMOL/L
BUN SERPL-MCNC: 68 MG/DL
CALCIUM SERPL-MCNC: 8.6 MG/DL
CHLORIDE SERPL-SCNC: 100 MMOL/L
CO2 SERPL-SCNC: 21 MMOL/L
CREAT SERPL-MCNC: 2.5 MG/DL
EST. GFR  (AFRICAN AMERICAN): 21.5 ML/MIN/1.73 M^2
EST. GFR  (NON AFRICAN AMERICAN): 18.6 ML/MIN/1.73 M^2
GLUCOSE SERPL-MCNC: 107 MG/DL
PHOSPHATE SERPL-MCNC: 4.4 MG/DL
POTASSIUM SERPL-SCNC: 3.9 MMOL/L
SODIUM SERPL-SCNC: 131 MMOL/L

## 2018-03-07 PROCEDURE — 80069 RENAL FUNCTION PANEL: CPT

## 2018-03-07 PROCEDURE — 99999 PR PBB SHADOW E&M-EST. PATIENT-LVL III: CPT | Mod: PBBFAC,,, | Performed by: INTERNAL MEDICINE

## 2018-03-07 PROCEDURE — 3078F DIAST BP <80 MM HG: CPT | Mod: CPTII,S$GLB,, | Performed by: INTERNAL MEDICINE

## 2018-03-07 PROCEDURE — 82088 ASSAY OF ALDOSTERONE: CPT

## 2018-03-07 PROCEDURE — 36415 COLL VENOUS BLD VENIPUNCTURE: CPT | Mod: PO

## 2018-03-07 PROCEDURE — 99214 OFFICE O/P EST MOD 30 MIN: CPT | Mod: S$GLB,,, | Performed by: INTERNAL MEDICINE

## 2018-03-07 PROCEDURE — 84244 ASSAY OF RENIN: CPT

## 2018-03-07 PROCEDURE — 3077F SYST BP >= 140 MM HG: CPT | Mod: CPTII,S$GLB,, | Performed by: INTERNAL MEDICINE

## 2018-03-07 PROCEDURE — 99499 UNLISTED E&M SERVICE: CPT | Mod: S$GLB,,, | Performed by: INTERNAL MEDICINE

## 2018-03-07 RX ORDER — VERAPAMIL HYDROCHLORIDE 240 MG/1
240 TABLET, FILM COATED, EXTENDED RELEASE ORAL NIGHTLY
Qty: 30 TABLET | Refills: 3 | Status: ON HOLD | OUTPATIENT
Start: 2018-03-07 | End: 2018-03-28 | Stop reason: HOSPADM

## 2018-03-07 RX ORDER — EPLERENONE 25 MG/1
25 TABLET, FILM COATED ORAL DAILY
Qty: 15 TABLET | Refills: 11 | Status: SHIPPED | OUTPATIENT
Start: 2018-03-07 | End: 2018-03-14 | Stop reason: SDUPTHER

## 2018-03-07 RX ORDER — LABETALOL 200 MG/1
TABLET, FILM COATED ORAL
Qty: 120 TABLET | Refills: 11 | Status: ON HOLD
Start: 2018-03-07 | End: 2018-03-28 | Stop reason: HOSPADM

## 2018-03-07 NOTE — PROGRESS NOTES
"3/7/18   Call pt and she states she is lying down trying to rest.  She states she will call me back later.  Asked pt if I could call her later this evening and she states "Let me get back with you".  4pm no return call from pt will call next week and attempt to close casee  "

## 2018-03-09 LAB — RENIN PLAS-CCNC: <0.6 NG/ML/H

## 2018-03-12 ENCOUNTER — PATIENT MESSAGE (OUTPATIENT)
Dept: NEPHROLOGY | Facility: CLINIC | Age: 73
End: 2018-03-12

## 2018-03-12 ENCOUNTER — TELEPHONE (OUTPATIENT)
Dept: FAMILY MEDICINE | Facility: CLINIC | Age: 73
End: 2018-03-12

## 2018-03-12 ENCOUNTER — OUTPATIENT CASE MANAGEMENT (OUTPATIENT)
Dept: ADMINISTRATIVE | Facility: OTHER | Age: 73
End: 2018-03-12

## 2018-03-12 ENCOUNTER — PATIENT OUTREACH (OUTPATIENT)
Dept: OTHER | Facility: OTHER | Age: 73
End: 2018-03-12

## 2018-03-12 DIAGNOSIS — I1A.0 RESISTANT HYPERTENSION: ICD-10-CM

## 2018-03-12 DIAGNOSIS — Z00.00 ROUTINE GENERAL MEDICAL EXAMINATION AT A HEALTH CARE FACILITY: Primary | ICD-10-CM

## 2018-03-12 LAB — ALDOST SERPL-MCNC: 24 NG/DL

## 2018-03-12 NOTE — TELEPHONE ENCOUNTER
----- Message from Debbie Bryan sent at 3/12/2018  9:25 AM CDT -----  Lab Orders Needed    I have scheduled the above patients annual physical and lab appointments. Lab orders need to be placed and linked.    Date of Annual Physical: 05/22/2018    Date of Lab appt: 05/16/2018    Thank You

## 2018-03-12 NOTE — PROGRESS NOTES
"Last 5 Patient Entered Readings                                      Current 30 Day Average: 181/83     Recent Readings 3/12/2018 3/12/2018 3/11/2018 3/11/2018 3/10/2018    SBP (mmHg) 163 184 183 182 196    DBP (mmHg) 79 76 80 83 89    Pulse 64 63 64 61 67      Patient called informing me she is concerned over her BP readings. Her note from Dr. Epperson isn't complete. I messaged to Dr. Wyman to get direction.    3/13: Per Dr. Wyman "She will be seeing me on Mar 26th we can consider checking this out. " I will follow-up after her appointment on 3/26.    "

## 2018-03-12 NOTE — PROGRESS NOTES
3/12/18   Chart reviewed in epic         Progress towards goals:  Hypertension- Pt has signed up for digital hypertension program and they are also working with pt on diet for that as well as chf.  Pt has basic understanding of low sodium diet and is well educated on medications and she stays in touch with medical providers through [facility].  Also noted that pt was started on new medication by nephrologist she states she obtained the new med eplerenone 25 mg po daily.  This is an aldosterone blocking med, pt aldosterone levels were slightly elevated.  Reviewed action and side effects and pt denies any side effects at this time.  She states today blood pressure was 165/84.  This is still elevated but lower than it has been in the past week.      Advised that today is case closure as she has met all the goals established.  Pt verbalized understanding and also advised to keep my number and contact me if any new needs arise.                 Clinical Reference Documents Added to Patient Instructions       Document    DIET, DISCHARGE INSTRUCTIONS FOR EATING A LOW-SALT  (ENGLISH)    HEART FAILURE, CONGESTIVE (CHF) (ENGLISH)    HEART FAILURE, DISCHARGE INSTRUCTIONS FOR (ENGLISH)    HEART FAILURE: BEING ACTIVE (ENGLISH)    HEART FAILURE: MAKING CHANGES TO YOUR DIET (ENGLISH)    HEART FAILURE: TRACKING YOUR WEIGHT (ENGLISH)    HEART FAILURE: WARNING SIGNS OF A FLARE-UP (ENGLISH)    LOW-SALT CHOICES (ENGLISH)    MY HEART FAILURE SYMPTOMS CHART (ENGLISH)    SALT, TIPS FOR USING LESS (ENGLISH)

## 2018-03-13 ENCOUNTER — PATIENT MESSAGE (OUTPATIENT)
Dept: NEPHROLOGY | Facility: CLINIC | Age: 73
End: 2018-03-13

## 2018-03-14 ENCOUNTER — PATIENT OUTREACH (OUTPATIENT)
Dept: OTHER | Facility: OTHER | Age: 73
End: 2018-03-14

## 2018-03-14 ENCOUNTER — TELEPHONE (OUTPATIENT)
Dept: FAMILY MEDICINE | Facility: CLINIC | Age: 73
End: 2018-03-14

## 2018-03-14 DIAGNOSIS — F43.29 STRESS AND ADJUSTMENT REACTION: Primary | ICD-10-CM

## 2018-03-14 RX ORDER — EPLERENONE 50 MG/1
50 TABLET, FILM COATED ORAL DAILY
Qty: 30 TABLET | Refills: 5 | Status: ON HOLD | OUTPATIENT
Start: 2018-03-14 | End: 2018-03-28 | Stop reason: HOSPADM

## 2018-03-14 NOTE — PROGRESS NOTES
"Last 5 Patient Entered Readings                                      Current 30 Day Average: 182/83     Recent Readings 3/13/2018 3/13/2018 3/13/2018 3/12/2018 3/12/2018    SBP (mmHg) 191 200 203 163 184    DBP (mmHg) 82 82 86 79 76    Pulse 65 61 61 64 63      Patient called this morning complaining of hallucinations. I am attributing this to clonidine therefore I am reducing her dose from 1 tablet in the morning and evening to a half tablet in the morning and evening.     Per Dr. Epperson "Ms. 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  Given continued elevation, will attempt trial of eplerenone (reduce torsemide concurrently with hopes to discontinue if able to titrate up eplerenone); plan to reduce irbesartan depending on tolerability and effectiveness of aldosterone blockade (especially given single kidney and hyperkalemia).  Will trend symptoms/creatinine closely with medication changes, will phone review BP diary within a few days."    BP schedule:  7am: hydralazine, labetalol, toresemide  9am: clonidine  3pm: hydralazine  7pm: labetalol, eplerenone  9-10pm: clonidine, hydralzine    Patient stated on 3/5 she stopped toresmide however she meant Avapro per Dr. Epperson's recommendation. I sent Dr. Epperson a message to get clarification on the doses and verapamil.   "

## 2018-03-14 NOTE — TELEPHONE ENCOUNTER
Spoke with patient advised her that requested referral placed and she would need to contact the department to schedule.  Department phone number provided to patient.

## 2018-03-14 NOTE — TELEPHONE ENCOUNTER
Hallucinations, stress with all of these medications, and depression.  She just needs someone to talk to.

## 2018-03-14 NOTE — TELEPHONE ENCOUNTER
----- Message from Tran Franky sent at 3/14/2018  9:49 AM CDT -----  Contact: call pt at [phone]  Patient would like to get a referral.  Does the patient already have the specialty clinic appointment scheduled:    If yes, what date is the appointment scheduled:     Referral to what specialty:  psychologist  Reason (be specific):    Does the patient want the referral with a specific physician:    [facility]   Comments:

## 2018-03-14 NOTE — PROGRESS NOTES
Last 5 Patient Entered Readings                                      Current 30 Day Average: 182/83     Recent Readings 3/13/2018 3/13/2018 3/13/2018 3/12/2018 3/12/2018    SBP (mmHg) 191 200 203 163 184    DBP (mmHg) 82 82 86 79 76    Pulse 65 61 61 64 63        High BP reading assessment. She states she has been feeling terrible the past few days. She states it has been since last week's nephrologist appointment. She states she spoke to her nephrologist yesterday and is waiting to her more from him/her. Encouraged patient to continue to speak to PCP or nephrologist.    Patient requested to speak with clinical pharmacist. Sent pharmacist message about patient request.     4/4-Patient admitted to hospital on 4/1. Will check/call again on 4/11.     Hypertension Digital Medicine Program (HDMP): Health  Follow Up    Lifestyle Modifications:    1.Low sodium diet: no Deferred    2.Physical activity: no Deferred    3.Hypotension/Hypertension symptoms: no Deferred  Frequency/Alleviating factors/Precipitating factors, etc.     4.Patient has been compliant with the medication regimen. Deferred    Follow up with Mrs. Ewelina Arnold completed. No further questions or concerns. I will follow up in a few weeks to assess progress.

## 2018-03-14 NOTE — PROGRESS NOTES
Subjective:       Patient ID: Ewelina Arnold is a 72 y.o. Black or  female who presents for re-evaluation of progression of Chronic Kidney Disease    HPI  Ms. 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  Patient seen during recent hospitalization for elevated blood pressure, reports continued elevation in blood pressure since discharge, unable to tolerate spironolactone due to nausea (along with elevated creatinine/potassium).  She otherwise denies any fever, chest pain, shortness of breath, abdominal pain, diarrhea, dysuria/hematuria.     Review of Systems   Constitutional: Negative for appetite change, fatigue and fever.   Respiratory: Negative for chest tightness and shortness of breath.    Cardiovascular: Negative for chest pain and leg swelling.   Gastrointestinal: Negative for abdominal pain, constipation, diarrhea, nausea and vomiting.   Genitourinary: Negative for difficulty urinating, dysuria, flank pain, frequency, hematuria and urgency.   Musculoskeletal: Negative for arthralgias, joint swelling and myalgias.   Skin: Negative for rash and wound.   Neurological: Negative for dizziness, weakness and light-headedness.   All other systems reviewed and are negative.      Objective:      Physical Exam   Constitutional: She appears well-developed and well-nourished.   Cardiovascular: Normal rate, regular rhythm and normal heart sounds.  Exam reveals no gallop and no friction rub.    No murmur heard.  Pulmonary/Chest: Effort normal and breath sounds normal. No respiratory distress. She has no wheezes. She has no rales.   Abdominal: Soft. Bowel sounds are normal. There is no tenderness.   Musculoskeletal: She exhibits no edema.   Neurological: She is alert.   Skin: Skin is warm and dry. No  rash noted. No erythema.   Psychiatric: She has a normal mood and affect.       Assessment:       1. CKD (chronic kidney disease), stage IV    2. Hyperaldosteronism    3. Solitary kidney        Plan:     Ms. 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  Given continued elevation, will attempt trial of eplerenone (reduce torsemide concurrently with hopes to discontinue if able to titrate up eplerenone); plan to reduce irbesartan depending on tolerability and effectiveness of aldosterone blockade (especially given single kidney and hyperkalemia).  Will trend symptoms/creatinine closely with medication changes, will phone review BP diary within a few days.  Further recommendations pending results of above, patient agreeable to plan as noted.    - Anemia: Hgb below goal, will consider erythropoetin therapy once BP controlled  - Bone/mineral metabolism: will check PTH/VitD    Return to clinic in 4-6 weeks with renal/heme panel, iron/TIBC/ferritin, urinalysis/culture, urine protein/creatinine ratio PTH/VitD prior to next visit

## 2018-03-15 DIAGNOSIS — I15.2 HYPERTENSION DUE TO ENDOCRINE DISORDER: Primary | ICD-10-CM

## 2018-03-15 RX ORDER — EPLERENONE 25 MG/1
25 TABLET, FILM COATED ORAL 2 TIMES DAILY
Qty: 30 TABLET | Refills: 3 | Status: CANCELLED | OUTPATIENT
Start: 2018-03-15

## 2018-03-16 ENCOUNTER — LAB VISIT (OUTPATIENT)
Dept: LAB | Facility: HOSPITAL | Age: 73
End: 2018-03-16
Attending: INTERNAL MEDICINE
Payer: MEDICARE

## 2018-03-16 ENCOUNTER — PATIENT OUTREACH (OUTPATIENT)
Dept: OTHER | Facility: OTHER | Age: 73
End: 2018-03-16

## 2018-03-16 DIAGNOSIS — N18.30 HYPERTENSIVE KIDNEY DISEASE WITH CHRONIC KIDNEY DISEASE STAGE III: ICD-10-CM

## 2018-03-16 DIAGNOSIS — I12.9 HYPERTENSIVE KIDNEY DISEASE WITH CHRONIC KIDNEY DISEASE STAGE III: Primary | ICD-10-CM

## 2018-03-16 DIAGNOSIS — I12.9 HYPERTENSIVE KIDNEY DISEASE WITH CHRONIC KIDNEY DISEASE STAGE III: ICD-10-CM

## 2018-03-16 DIAGNOSIS — N18.30 HYPERTENSIVE KIDNEY DISEASE WITH CHRONIC KIDNEY DISEASE STAGE III: Primary | ICD-10-CM

## 2018-03-16 LAB
ALBUMIN SERPL BCP-MCNC: 3.1 G/DL
ALP SERPL-CCNC: 97 U/L
ALT SERPL W/O P-5'-P-CCNC: 21 U/L
ANION GAP SERPL CALC-SCNC: 10 MMOL/L
AST SERPL-CCNC: 46 U/L
BILIRUB SERPL-MCNC: 0.2 MG/DL
BUN SERPL-MCNC: 58 MG/DL
CALCIUM SERPL-MCNC: 9.3 MG/DL
CHLORIDE SERPL-SCNC: 97 MMOL/L
CO2 SERPL-SCNC: 26 MMOL/L
CREAT SERPL-MCNC: 2.9 MG/DL
EST. GFR  (AFRICAN AMERICAN): 18 ML/MIN/1.73 M^2
EST. GFR  (NON AFRICAN AMERICAN): 15.6 ML/MIN/1.73 M^2
GLUCOSE SERPL-MCNC: 92 MG/DL
POTASSIUM SERPL-SCNC: 4 MMOL/L
PROT SERPL-MCNC: 7.1 G/DL
SODIUM SERPL-SCNC: 133 MMOL/L

## 2018-03-16 PROCEDURE — 36415 COLL VENOUS BLD VENIPUNCTURE: CPT | Mod: PO

## 2018-03-16 PROCEDURE — 80053 COMPREHEN METABOLIC PANEL: CPT

## 2018-03-16 NOTE — PROGRESS NOTES
"Last 5 Patient Entered Readings                                      Current 30 Day Average: 181/82     Recent Readings 3/15/2018 3/15/2018 3/15/2018 3/15/2018 3/14/2018    SBP (mmHg) 159 138 162 132 189    DBP (mmHg) 72 67 74 71 88    Pulse 65 65 64 66 61        Patient continues to feel "woozy." I am sending her for labs today to make sure she is tolerating eplerenone.     Patient called at 2pm informing me she went to labs and vomited once this morning and twice last night. She hasn't taken any of her medications. I advised her to skip her morning medications and we can wait until her 3pm medications to begin again. I sent Dr. Walton a message informing her of her symptoms.    Patient's BP average is above goal of <130/80.     Patient denies s/s of hypotension (lightheadedness, dizziness, nausea, fatigue) associated with low readings. Instructed patient to inform me if this occurs, patient confirms understanding.      Patient denies s/s of hypertension (SOB, CP, severe headaches, changes in vision) associated with high readings. Instructed patient to go to the ED if BP > 180/110 and accompanied by hypertensive s/s, patient confirms understanding.    Will continue to monitor regularly. Will follow up in 2-3 weeks, sooner if BP begins to trend upward or downward.    Patient has my contact information and knows to call with any concerns or clinical changes.     Current HTN regimen:  Hypertension Medications             cloNIDine (CATAPRES) 0.2 MG tablet Take 1 tablet (0.2 mg total) by mouth 2 (two) times daily.    eplerenone (INSPRA) 50 MG Tab Take 1 tablet (50 mg total) by mouth once daily.    hydrALAZINE (APRESOLINE) 50 MG tablet Take 2 tablets (100 mg total) by mouth every 8 (eight) hours.    irbesartan (AVAPRO) 300 MG tablet Take 1 tablet (300 mg total) by mouth once daily. please take at 9AM    labetalol (NORMODYNE) 200 MG tablet 400mg in AM, 600mg in PM    nitroGLYCERIN (NITROSTAT) 0.4 MG SL tablet " Place 1 tablet (0.4 mg total) under the tongue every 5 (five) minutes as needed for Chest pain.    torsemide (DEMADEX) 20 MG Tab Take 2 tablets (40 mg total) by mouth once daily.    verapamil (CALAN-SR) 240 MG CR tablet Take 1 tablet (240 mg total) by mouth every evening.

## 2018-03-19 ENCOUNTER — NURSE TRIAGE (OUTPATIENT)
Dept: ADMINISTRATIVE | Facility: CLINIC | Age: 73
End: 2018-03-19

## 2018-03-19 ENCOUNTER — HOSPITAL ENCOUNTER (INPATIENT)
Facility: OTHER | Age: 73
LOS: 9 days | Discharge: HOME | DRG: 292 | End: 2018-03-28
Attending: EMERGENCY MEDICINE | Admitting: INTERNAL MEDICINE
Payer: MEDICARE

## 2018-03-19 DIAGNOSIS — R11.2 NAUSEA AND VOMITING, INTRACTABILITY OF VOMITING NOT SPECIFIED, UNSPECIFIED VOMITING TYPE: ICD-10-CM

## 2018-03-19 DIAGNOSIS — R53.1 WEAKNESS: ICD-10-CM

## 2018-03-19 DIAGNOSIS — E87.1 HYPONATREMIA: ICD-10-CM

## 2018-03-19 DIAGNOSIS — R42 LIGHTHEADED: ICD-10-CM

## 2018-03-19 DIAGNOSIS — E26.9 HYPERALDOSTERONISM: Primary | ICD-10-CM

## 2018-03-19 DIAGNOSIS — E03.9 HYPOTHYROIDISM, UNSPECIFIED TYPE: ICD-10-CM

## 2018-03-19 DIAGNOSIS — I10 MALIGNANT HYPERTENSION: ICD-10-CM

## 2018-03-19 DIAGNOSIS — N18.9 CHRONIC RENAL IMPAIRMENT, UNSPECIFIED CKD STAGE: ICD-10-CM

## 2018-03-19 LAB
ALBUMIN SERPL BCP-MCNC: 3.1 G/DL
ALP SERPL-CCNC: 91 U/L
ALT SERPL W/O P-5'-P-CCNC: 20 U/L
ANION GAP SERPL CALC-SCNC: 7 MMOL/L
AST SERPL-CCNC: 40 U/L
BACTERIA #/AREA URNS HPF: ABNORMAL /HPF
BASOPHILS # BLD AUTO: 0.01 K/UL
BASOPHILS NFR BLD: 0.3 %
BILIRUB SERPL-MCNC: 0.3 MG/DL
BILIRUB UR QL STRIP: NEGATIVE
BUN SERPL-MCNC: 70 MG/DL
CALCIUM SERPL-MCNC: 9.5 MG/DL
CHLORIDE SERPL-SCNC: 99 MMOL/L
CLARITY UR: CLEAR
CO2 SERPL-SCNC: 26 MMOL/L
COLOR UR: YELLOW
CREAT SERPL-MCNC: 2.8 MG/DL
DIFFERENTIAL METHOD: ABNORMAL
EOSINOPHIL # BLD AUTO: 0.1 K/UL
EOSINOPHIL NFR BLD: 1.3 %
ERYTHROCYTE [DISTWIDTH] IN BLOOD BY AUTOMATED COUNT: 13.8 %
EST. GFR  (AFRICAN AMERICAN): 19 ML/MIN/1.73 M^2
EST. GFR  (NON AFRICAN AMERICAN): 16 ML/MIN/1.73 M^2
GLUCOSE SERPL-MCNC: 92 MG/DL
GLUCOSE UR QL STRIP: NEGATIVE
HCT VFR BLD AUTO: 32 %
HGB BLD-MCNC: 10.3 G/DL
HGB UR QL STRIP: NEGATIVE
HYALINE CASTS #/AREA URNS LPF: 2 /LPF
KETONES UR QL STRIP: NEGATIVE
LEUKOCYTE ESTERASE UR QL STRIP: NEGATIVE
LYMPHOCYTES # BLD AUTO: 0.8 K/UL
LYMPHOCYTES NFR BLD: 21.7 %
MCH RBC QN AUTO: 25.7 PG
MCHC RBC AUTO-ENTMCNC: 32.2 G/DL
MCV RBC AUTO: 80 FL
MICROSCOPIC COMMENT: ABNORMAL
MONOCYTES # BLD AUTO: 0.7 K/UL
MONOCYTES NFR BLD: 18.8 %
NEUTROPHILS # BLD AUTO: 2.2 K/UL
NEUTROPHILS NFR BLD: 57.9 %
NITRITE UR QL STRIP: NEGATIVE
NON-SQ EPI CELLS #/AREA URNS HPF: 3 /HPF
PH UR STRIP: 6 [PH] (ref 5–8)
PLATELET # BLD AUTO: 227 K/UL
PMV BLD AUTO: 10.5 FL
POTASSIUM SERPL-SCNC: 3.9 MMOL/L
PROT SERPL-MCNC: 7.5 G/DL
PROT UR QL STRIP: ABNORMAL
RBC # BLD AUTO: 4.01 M/UL
RBC #/AREA URNS HPF: 0 /HPF (ref 0–4)
SODIUM SERPL-SCNC: 132 MMOL/L
SP GR UR STRIP: 1.02 (ref 1–1.03)
URN SPEC COLLECT METH UR: ABNORMAL
UROBILINOGEN UR STRIP-ACNC: NEGATIVE EU/DL
WBC # BLD AUTO: 3.78 K/UL
WBC #/AREA URNS HPF: 0 /HPF (ref 0–5)

## 2018-03-19 PROCEDURE — 93005 ELECTROCARDIOGRAM TRACING: CPT

## 2018-03-19 PROCEDURE — 63600175 PHARM REV CODE 636 W HCPCS: Performed by: EMERGENCY MEDICINE

## 2018-03-19 PROCEDURE — 11000001 HC ACUTE MED/SURG PRIVATE ROOM

## 2018-03-19 PROCEDURE — 96374 THER/PROPH/DIAG INJ IV PUSH: CPT

## 2018-03-19 PROCEDURE — 81000 URINALYSIS NONAUTO W/SCOPE: CPT

## 2018-03-19 PROCEDURE — G0378 HOSPITAL OBSERVATION PER HR: HCPCS

## 2018-03-19 PROCEDURE — 93010 ELECTROCARDIOGRAM REPORT: CPT | Mod: ,,, | Performed by: INTERNAL MEDICINE

## 2018-03-19 PROCEDURE — 80053 COMPREHEN METABOLIC PANEL: CPT

## 2018-03-19 PROCEDURE — 25000003 PHARM REV CODE 250: Performed by: EMERGENCY MEDICINE

## 2018-03-19 PROCEDURE — 99284 EMERGENCY DEPT VISIT MOD MDM: CPT | Mod: 25

## 2018-03-19 PROCEDURE — 85025 COMPLETE CBC W/AUTO DIFF WBC: CPT

## 2018-03-19 RX ORDER — LABETALOL 300 MG/1
600 TABLET, FILM COATED ORAL
Status: COMPLETED | OUTPATIENT
Start: 2018-03-19 | End: 2018-03-19

## 2018-03-19 RX ORDER — LEVOTHYROXINE SODIUM 75 UG/1
75 TABLET ORAL
Status: DISCONTINUED | OUTPATIENT
Start: 2018-03-20 | End: 2018-03-28 | Stop reason: HOSPADM

## 2018-03-19 RX ORDER — NITROGLYCERIN 0.4 MG/1
0.4 TABLET SUBLINGUAL EVERY 5 MIN PRN
Status: DISCONTINUED | OUTPATIENT
Start: 2018-03-20 | End: 2018-03-28 | Stop reason: HOSPADM

## 2018-03-19 RX ORDER — PANTOPRAZOLE SODIUM 40 MG/1
40 TABLET, DELAYED RELEASE ORAL DAILY
Status: DISCONTINUED | OUTPATIENT
Start: 2018-03-20 | End: 2018-03-20

## 2018-03-19 RX ORDER — ACETAMINOPHEN 325 MG/1
650 TABLET ORAL EVERY 4 HOURS PRN
Status: DISCONTINUED | OUTPATIENT
Start: 2018-03-20 | End: 2018-03-28 | Stop reason: HOSPADM

## 2018-03-19 RX ORDER — LABETALOL 200 MG/1
400 TABLET, FILM COATED ORAL EVERY 12 HOURS
Status: DISCONTINUED | OUTPATIENT
Start: 2018-03-20 | End: 2018-03-20

## 2018-03-19 RX ORDER — ROSUVASTATIN CALCIUM 10 MG/1
10 TABLET, COATED ORAL NIGHTLY
Status: DISCONTINUED | OUTPATIENT
Start: 2018-03-20 | End: 2018-03-28 | Stop reason: HOSPADM

## 2018-03-19 RX ORDER — SPIRONOLACTONE 25 MG/1
25 TABLET ORAL 2 TIMES DAILY
Status: ON HOLD | COMMUNITY
End: 2018-03-28 | Stop reason: HOSPADM

## 2018-03-19 RX ORDER — CLONIDINE HYDROCHLORIDE 0.1 MG/1
0.2 TABLET ORAL
Status: DISCONTINUED | OUTPATIENT
Start: 2018-03-19 | End: 2018-03-19

## 2018-03-19 RX ORDER — IRBESARTAN 150 MG/1
300 TABLET ORAL DAILY
Status: DISCONTINUED | OUTPATIENT
Start: 2018-03-20 | End: 2018-03-20

## 2018-03-19 RX ORDER — LATANOPROST 50 UG/ML
1 SOLUTION/ DROPS OPHTHALMIC DAILY
Status: DISCONTINUED | OUTPATIENT
Start: 2018-03-20 | End: 2018-03-27

## 2018-03-19 RX ORDER — VERAPAMIL HYDROCHLORIDE 240 MG/1
240 TABLET, FILM COATED, EXTENDED RELEASE ORAL
Status: COMPLETED | OUTPATIENT
Start: 2018-03-19 | End: 2018-03-19

## 2018-03-19 RX ORDER — HYDRALAZINE HYDROCHLORIDE 20 MG/ML
10 INJECTION INTRAMUSCULAR; INTRAVENOUS
Status: COMPLETED | OUTPATIENT
Start: 2018-03-19 | End: 2018-03-19

## 2018-03-19 RX ORDER — ONDANSETRON 2 MG/ML
4 INJECTION INTRAMUSCULAR; INTRAVENOUS EVERY 8 HOURS PRN
Status: DISCONTINUED | OUTPATIENT
Start: 2018-03-20 | End: 2018-03-28 | Stop reason: HOSPADM

## 2018-03-19 RX ORDER — SODIUM CHLORIDE 0.9 % (FLUSH) 0.9 %
5 SYRINGE (ML) INJECTION
Status: DISCONTINUED | OUTPATIENT
Start: 2018-03-20 | End: 2018-03-28 | Stop reason: HOSPADM

## 2018-03-19 RX ORDER — HYDROXYCHLOROQUINE SULFATE 200 MG/1
200 TABLET, FILM COATED ORAL 2 TIMES DAILY
Status: DISCONTINUED | OUTPATIENT
Start: 2018-03-20 | End: 2018-03-28 | Stop reason: HOSPADM

## 2018-03-19 RX ORDER — ASPIRIN 81 MG/1
81 TABLET ORAL DAILY
Status: DISCONTINUED | OUTPATIENT
Start: 2018-03-20 | End: 2018-03-28 | Stop reason: HOSPADM

## 2018-03-19 RX ORDER — VERAPAMIL HYDROCHLORIDE 120 MG/1
240 TABLET, FILM COATED, EXTENDED RELEASE ORAL NIGHTLY
Status: DISCONTINUED | OUTPATIENT
Start: 2018-03-20 | End: 2018-03-20

## 2018-03-19 RX ORDER — HYDRALAZINE HYDROCHLORIDE 25 MG/1
100 TABLET, FILM COATED ORAL EVERY 8 HOURS
Status: DISCONTINUED | OUTPATIENT
Start: 2018-03-20 | End: 2018-03-20

## 2018-03-19 RX ADMIN — VERAPAMIL HYDROCHLORIDE 240 MG: 240 TABLET, FILM COATED, EXTENDED RELEASE ORAL at 09:03

## 2018-03-19 RX ADMIN — LABETALOL HCL 600 MG: 300 TABLET, FILM COATED ORAL at 09:03

## 2018-03-19 RX ADMIN — HYDRALAZINE HYDROCHLORIDE 10 MG: 20 INJECTION INTRAMUSCULAR; INTRAVENOUS at 08:03

## 2018-03-19 RX ADMIN — SODIUM CHLORIDE 1000 ML: 0.9 INJECTION, SOLUTION INTRAVENOUS at 07:03

## 2018-03-19 NOTE — PROGRESS NOTES
Last 5 Patient Entered Readings                                      Current 30 Day Average: 180/81     Recent Readings 3/17/2018 3/17/2018 3/16/2018 3/16/2018 3/15/2018    SBP (mmHg) 175 141 174 168 159    DBP (mmHg) 72 64 72 75 72    Pulse 66 67 68 68 65        Patient's labs showed hyponatremia most likely causing her fatigued, weakness and nausea. She stopped torsemide per Dr. Epperson. I sent a message to her PCP and Dr. Epperson for further clarification on what to do. Dr. Walton informed me she would like to defer to Dr. Epperson given her kidney function.    She stated she is feeling better today since she stopped torsemide.     I will follow-up later this week to see his response.    3/19: Patient called this afternoon informing me she had a dizzy episode and almost passed out. I messaged Dr. Walton and informed the patient to call [facility].    3/20: Patient was admitted.

## 2018-03-19 NOTE — TELEPHONE ENCOUNTER
"    Reason for Disposition   [1] SEVERE weakness (i.e., unable to walk or barely able to walk, requires support) AND [2] new onset or worsening     Ewelina called to say she is having extreme weakness, gait instability.  She said she feels like she is barely able to walk, is nauseated, vomits, has not been able to eat and is losing weight.  She attributes this to the eplerenone 50 mg that was prescribed by Dr Epperson, nephrology, on 03/14.  Recommended call 911 now.  She declines; said her daughter will bring her to the ED now.  Message to Kalie Walton , pcp, and Dr Epperson.  Please contact caller directly with any additional care advice.    Answer Assessment - Initial Assessment Questions  1. SYMPTOM: "What is the main symptom you are concerned about?" (e.g., weakness, numbness)      Difficulty walking, weakness, lightheaded/    2. ONSET: "When did this start?" (minutes, hours, days; while sleeping)      Began a week ago, but it is worse today.    3. LAST NORMAL: "When was the last time you were normal (no symptoms)?"      The last time was before I began taking eplerenone, prescribed by Dr Bragg in nephrology, 03/14/18.    4. PATTERN "Does this come and go, or has it been constant since it started?"  "Is it present now?"      It is present now, and it is constant.    5. CARDIAC SYMPTOMS: "Have you had any of the following symptoms: chest pain, difficulty breathing, palpitations?"      No to all.    6. NEUROLOGIC SYMPTOMS: "Have you had any of the following symptoms: headache, dizziness, vision loss, double vision, changes in speech, unsteady on your feet?"      Headache, I feel like I can't stand any more, like I am too weak and lightheaded, unsteady on my feet. I feel like I want to fall down to the floor.    7. OTHER SYMPTOMS: "Do you have any other symptoms?"      I am nauseated.  I am losing a lot of weight, I am vomiting.    8. PREGNANCY: "Is there any chance you are pregnant?" "When was your " "last menstrual period?"      N/a    Protocols used: [facility] NEUROLOGIC DEFICIT-A-AH      "

## 2018-03-20 PROBLEM — R00.1 BRADYCARDIA: Status: ACTIVE | Noted: 2018-03-20

## 2018-03-20 LAB
ALBUMIN SERPL BCP-MCNC: 2.6 G/DL
ALP SERPL-CCNC: 77 U/L
ALT SERPL W/O P-5'-P-CCNC: 15 U/L
ANION GAP SERPL CALC-SCNC: 8 MMOL/L
AST SERPL-CCNC: 30 U/L
BASOPHILS # BLD AUTO: 0.02 K/UL
BASOPHILS NFR BLD: 0.6 %
BILIRUB SERPL-MCNC: 0.2 MG/DL
BUN SERPL-MCNC: 66 MG/DL
CALCIUM SERPL-MCNC: 8.7 MG/DL
CHLORIDE SERPL-SCNC: 104 MMOL/L
CHOLEST SERPL-MCNC: 93 MG/DL
CHOLEST/HDLC SERPL: 2.8 {RATIO}
CK SERPL-CCNC: 107 U/L
CO2 SERPL-SCNC: 22 MMOL/L
CREAT SERPL-MCNC: 2.9 MG/DL
DIFFERENTIAL METHOD: ABNORMAL
EOSINOPHIL # BLD AUTO: 0.1 K/UL
EOSINOPHIL NFR BLD: 1.4 %
ERYTHROCYTE [DISTWIDTH] IN BLOOD BY AUTOMATED COUNT: 13.8 %
EST. GFR  (AFRICAN AMERICAN): 18 ML/MIN/1.73 M^2
EST. GFR  (NON AFRICAN AMERICAN): 16 ML/MIN/1.73 M^2
GLUCOSE SERPL-MCNC: 79 MG/DL
HCT VFR BLD AUTO: 26.7 %
HDLC SERPL-MCNC: 33 MG/DL
HDLC SERPL: 35.5 %
HGB BLD-MCNC: 8.6 G/DL
LDLC SERPL CALC-MCNC: 48.2 MG/DL
LYMPHOCYTES # BLD AUTO: 1 K/UL
LYMPHOCYTES NFR BLD: 27.1 %
MAGNESIUM SERPL-MCNC: 1.6 MG/DL
MCH RBC QN AUTO: 25.4 PG
MCHC RBC AUTO-ENTMCNC: 32.2 G/DL
MCV RBC AUTO: 79 FL
MONOCYTES # BLD AUTO: 0.8 K/UL
MONOCYTES NFR BLD: 21.4 %
NEUTROPHILS # BLD AUTO: 1.7 K/UL
NEUTROPHILS NFR BLD: 49.2 %
NONHDLC SERPL-MCNC: 60 MG/DL
PHOSPHATE SERPL-MCNC: 4.2 MG/DL
PLATELET # BLD AUTO: 194 K/UL
PMV BLD AUTO: 9.6 FL
POTASSIUM SERPL-SCNC: 4 MMOL/L
PROT SERPL-MCNC: 6 G/DL
RBC # BLD AUTO: 3.38 M/UL
SODIUM SERPL-SCNC: 134 MMOL/L
TRIGL SERPL-MCNC: 59 MG/DL
URATE SERPL-MCNC: 10.7 MG/DL
WBC # BLD AUTO: 3.51 K/UL

## 2018-03-20 PROCEDURE — 63600175 PHARM REV CODE 636 W HCPCS: Performed by: PHYSICIAN ASSISTANT

## 2018-03-20 PROCEDURE — 83516 IMMUNOASSAY NONANTIBODY: CPT

## 2018-03-20 PROCEDURE — 25000003 PHARM REV CODE 250: Performed by: NURSE PRACTITIONER

## 2018-03-20 PROCEDURE — 84550 ASSAY OF BLOOD/URIC ACID: CPT

## 2018-03-20 PROCEDURE — 82550 ASSAY OF CK (CPK): CPT

## 2018-03-20 PROCEDURE — 63600175 PHARM REV CODE 636 W HCPCS: Performed by: NURSE PRACTITIONER

## 2018-03-20 PROCEDURE — 80061 LIPID PANEL: CPT

## 2018-03-20 PROCEDURE — 25000003 PHARM REV CODE 250: Performed by: INTERNAL MEDICINE

## 2018-03-20 PROCEDURE — 83735 ASSAY OF MAGNESIUM: CPT

## 2018-03-20 PROCEDURE — 36415 COLL VENOUS BLD VENIPUNCTURE: CPT

## 2018-03-20 PROCEDURE — 99233 SBSQ HOSP IP/OBS HIGH 50: CPT | Mod: ,,, | Performed by: INTERNAL MEDICINE

## 2018-03-20 PROCEDURE — G0378 HOSPITAL OBSERVATION PER HR: HCPCS

## 2018-03-20 PROCEDURE — 85025 COMPLETE CBC W/AUTO DIFF WBC: CPT

## 2018-03-20 PROCEDURE — 80053 COMPREHEN METABOLIC PANEL: CPT

## 2018-03-20 PROCEDURE — 84100 ASSAY OF PHOSPHORUS: CPT

## 2018-03-20 PROCEDURE — 11000001 HC ACUTE MED/SURG PRIVATE ROOM

## 2018-03-20 PROCEDURE — 99220 PR INITIAL OBSERVATION CARE,LEVL III: CPT | Mod: ,,, | Performed by: NURSE PRACTITIONER

## 2018-03-20 RX ORDER — FAMOTIDINE 20 MG/1
20 TABLET, FILM COATED ORAL DAILY
Status: DISCONTINUED | OUTPATIENT
Start: 2018-03-20 | End: 2018-03-28 | Stop reason: HOSPADM

## 2018-03-20 RX ORDER — IRBESARTAN 150 MG/1
150 TABLET ORAL DAILY
Status: DISCONTINUED | OUTPATIENT
Start: 2018-03-20 | End: 2018-03-20

## 2018-03-20 RX ORDER — HEPARIN SODIUM 5000 [USP'U]/ML
5000 INJECTION, SOLUTION INTRAVENOUS; SUBCUTANEOUS EVERY 8 HOURS
Status: DISCONTINUED | OUTPATIENT
Start: 2018-03-20 | End: 2018-03-28 | Stop reason: HOSPADM

## 2018-03-20 RX ORDER — HYDRALAZINE HYDROCHLORIDE 20 MG/ML
10 INJECTION INTRAMUSCULAR; INTRAVENOUS ONCE
Status: COMPLETED | OUTPATIENT
Start: 2018-03-20 | End: 2018-03-20

## 2018-03-20 RX ORDER — SODIUM CHLORIDE 9 MG/ML
INJECTION, SOLUTION INTRAVENOUS ONCE
Status: COMPLETED | OUTPATIENT
Start: 2018-03-20 | End: 2018-03-20

## 2018-03-20 RX ORDER — HYDRALAZINE HYDROCHLORIDE 20 MG/ML
10 INJECTION INTRAMUSCULAR; INTRAVENOUS EVERY 8 HOURS PRN
Status: DISCONTINUED | OUTPATIENT
Start: 2018-03-20 | End: 2018-03-21

## 2018-03-20 RX ORDER — EPLERENONE 25 MG/1
50 TABLET, FILM COATED ORAL 2 TIMES DAILY
Status: DISCONTINUED | OUTPATIENT
Start: 2018-03-20 | End: 2018-03-25

## 2018-03-20 RX ADMIN — PANTOPRAZOLE SODIUM 40 MG: 40 TABLET, DELAYED RELEASE ORAL at 09:03

## 2018-03-20 RX ADMIN — CITALOPRAM HYDROBROMIDE 30 MG: 20 TABLET ORAL at 09:03

## 2018-03-20 RX ADMIN — FAMOTIDINE 20 MG: 20 TABLET, FILM COATED ORAL at 01:03

## 2018-03-20 RX ADMIN — ASPIRIN 81 MG: 81 TABLET, COATED ORAL at 09:03

## 2018-03-20 RX ADMIN — ROSUVASTATIN CALCIUM 10 MG: 10 TABLET, FILM COATED ORAL at 09:03

## 2018-03-20 RX ADMIN — HYDROXYCHLOROQUINE SULFATE 200 MG: 200 TABLET, FILM COATED ORAL at 09:03

## 2018-03-20 RX ADMIN — HEPARIN SODIUM 5000 UNITS: 5000 INJECTION, SOLUTION INTRAVENOUS; SUBCUTANEOUS at 09:03

## 2018-03-20 RX ADMIN — HYDRALAZINE HYDROCHLORIDE 10 MG: 20 INJECTION INTRAMUSCULAR; INTRAVENOUS at 08:03

## 2018-03-20 RX ADMIN — EPLERENONE 50 MG: 25 TABLET ORAL at 10:03

## 2018-03-20 RX ADMIN — HEPARIN SODIUM 5000 UNITS: 5000 INJECTION, SOLUTION INTRAVENOUS; SUBCUTANEOUS at 01:03

## 2018-03-20 RX ADMIN — SODIUM CHLORIDE: 0.9 INJECTION, SOLUTION INTRAVENOUS at 02:03

## 2018-03-20 RX ADMIN — HEPARIN SODIUM 5000 UNITS: 5000 INJECTION, SOLUTION INTRAVENOUS; SUBCUTANEOUS at 05:03

## 2018-03-20 RX ADMIN — LATANOPROST 1 DROP: 50 SOLUTION OPHTHALMIC at 09:03

## 2018-03-20 RX ADMIN — LEVOTHYROXINE SODIUM 75 MCG: 75 TABLET ORAL at 05:03

## 2018-03-20 RX ADMIN — HYDRALAZINE HYDROCHLORIDE 100 MG: 25 TABLET, FILM COATED ORAL at 01:03

## 2018-03-20 NOTE — ED NOTES
Patient presents to the Ed with a complaint of fatigue, decrease appetite, and weakness that has been going on since January. Patient states that she has fallen several times within the last few months, denies any LOC, pt states that she has been short of breath lately, states that she sometimes have fluids in her legs.     Patient awake alert and oriented, neurologically intact. Patient pupils round equal and responsive to light. Patient breath sounds clear but diminished. Peripherally pulses palpable, no edema noted. Patient  equal on both sides, cap refill less than 3 seconds. Patient placed on the cardiac monitor, currently slightly hypertensive.

## 2018-03-20 NOTE — ASSESSMENT & PLAN NOTE
Patient was given 600 mg of Labetalol today and had her HR drop to 45.    Hold parameters assigned.  Nephrology assistance with titrating BP meds with current renal function

## 2018-03-20 NOTE — CONSULTS
"Consult Note  Nephrology    Consult Requested By: Yves Feliciano MD  Reason for Consult: MARYAM/Labile BP    SUBJECTIVE:     History of Present Illness:  Patient is a 72 y.o. female presents with feeling unwell, weak, tired, and BP problems.  EPIC reviewed and events noted of bradycardia.  She has had multiple medication changes recently due to Acc HTN.  Admit /105.  Given cocktail of pills and developed bradycardia (HR 30-40) and hypotension (). Extensive medical hx as detailed below and followed closely by multiple providers at [facility].  Renal hx of nephrectomy (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 in 1985),with baseline Cr 1.8 and fairly well controlled HTN until late December 2017 at which time the patient began to have difficulty controlling BP and CR began to rise and has since fluctuated in range 2.4-3.3 and today is 2.9. She had an admission in Jan 2017 for w/u and control of her HTN and from this it appears she has hyperaldosteronism 1o vs 2o and being treated as 1oHyperaldosteronism with recent addition of Epleronone and de-escalation of Torsemide. It appears that during the time the her Cr luciana to 3.3 she was also on an increased dose of Irbesartan which has since been held. She has had additional consults from Endocrine, and the Cardiology clininc. She appears to mainly be folowed now by Dr. Epperson at [facility] for [place]. She has had renal artery dopplers which were minimally suspicious for FRAN. Additionally a non-contrast CT done during that same admission was negative for adrenal mass (althogh not adrenal protocol). The Plasma Aldostrerone levels are elevated and the ratio is markedly elevated with a very low renin level.    Still feels weak and dehydrated and admits to very poor appetite and oral intake over past few weeks with BP issues.  Also has lost about 30+ lbs in past few months.      No CP/SOB/N/V/D/F/C.  "I just dont feel right".     Past Medical History:   Diagnosis Date    Acute on chronic diastolic " congestive heart failure 11/17/2017    Allergy     Anatomical narrow angle glaucoma     Anemia     States diagnosed about a month ago    Aortic atherosclerosis     Arthritis     Cataract     CHF (congestive heart failure)     Chronic kidney disease     Chronic sciatica of left side     Right lower back pain due to sciatica    Coronary artery disease     Depression     Dry eyes     GERD (gastroesophageal reflux disease)     History of colon cancer     Hyperaldosteronism     Hyperlipidemia     Hypertension     Hypothyroidism     Left ventricular diastolic dysfunction with preserved systolic function     Lupus (systemic lupus erythematosus) 8/17/2012    Malnutrition 5/16/2017    Osteoarthritis of cervical spine 8/17/2012    Osteopenia     PAD (peripheral artery disease)     FRAN (renal artery stenosis)      Past Surgical History:   Procedure Laterality Date    CARDIAC CATHETERIZATION  07/27/2011    x1    CHOLECYSTECTOMY  1999    COLON SURGERY  2000 & 2003    partial removal    COLONOSCOPY N/A 4/14/2016    Procedure: COLONOSCOPY;  Surgeon: Jose Steen MD;  Location: [facility] ([address] [place] [address]);  Service: Endoscopy;  Laterality: N/A;  prep 2 days prior light meals only/clear liquid day before  and 4 dulcolax tabs (5 mgs) at noon    COLONOSCOPY N/A 9/14/2017    Procedure: COLONOSCOPY;  Surgeon: Jose tSeen MD;  Location: [facility] ([address] [place] [address]);  Service: Endoscopy;  Laterality: N/A;    EYE SURGERY      HAND SURGERY Bilateral 1996 & 1997    due to carpal tunnel     HERNIA REPAIR      HYSTERECTOMY  1983    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    OOPHORECTOMY      removed one    Peripheral Iridotomy both eyes  1994    laser angle correction    THYROIDECTOMY, PARTIAL  2006    to remove two nodules and one-half thyroid     Family History   Problem Relation Age of Onset    Heart attack Mother     Hypertension Mother     Heart disease Father     Hypertension Father     Diabetes Maternal  Grandmother     Glaucoma Maternal Grandmother     Hypertension Sister     Kidney disease Brother     Kidney failure Brother      received donated kidney from sister    No Known Problems Daughter     Diabetes Son     Hypertension Brother     Diabetes Maternal Aunt     No Known Problems Maternal Grandfather     No Known Problems Paternal Grandmother     No Known Problems Paternal Grandfather     Acne Neg Hx     Eczema Neg Hx     Lupus Neg Hx     Psoriasis Neg Hx     Melanoma Neg Hx     Amblyopia Neg Hx     Blindness Neg Hx     Cataracts Neg Hx     Macular degeneration Neg Hx     Retinal detachment Neg Hx     Strabismus Neg Hx      Social History   Substance Use Topics    Smoking status: Former Smoker     Packs/day: 1.50     Years: 30.00     Types: Cigarettes     Start date: 1955     Quit date: 3/13/1985    Smokeless tobacco: Never Used    Alcohol use No       Review of patient's allergies indicates:   Allergen Reactions    Ace inhibitors Swelling     Lips Swelling    Vioxx [rofecoxib] Swelling      lips swelling    Bactrim [sulfamethoxazole-trimethoprim]      Pt would like this medication to be added due to elevation of creatinine with single kidney.    Procardia [nifedipine] Nausea Only and Edema     Feet swelling and nausea, now reports that this has happened in the past and required lasix    Arthrotec 50 [diclofenac-misoprostol]      Unknown    Bentyl [dicyclomine]      Not effective    Doxycycline Nausea Only    Imdur [isosorbide mononitrate] Nausea Only    Lomotil [diphenoxylate-atropine] Nausea And Vomiting     Stomach cramps with vomitting    Lozol [indapamide] Rash    Norvasc [amlodipine]      Not tolerated    Tramadol Nausea Only        Review of Systems:  Constitutional: No fever or chills  Respiratory: No cough or shortness of breath  Cardiovascular: No chest pain or palpitations  Gastrointestinal: No nausea or vomiting  Neurological: No confusion or  weakness    OBJECTIVE:     Vital Signs (Most Recent)  Temp: 98.2 °F (36.8 °C) (03/20/18 0738)  Pulse: (!) 49 (03/20/18 1000)  Resp: 16 (03/20/18 0738)  BP: (!) 162/72 (03/20/18 0748)  SpO2: 98 % (03/20/18 0738)    Vital Signs Range (Last 24H):  Temp:  [97.8 °F (36.6 °C)-98.7 °F (37.1 °C)]   Pulse:  [36-68]   Resp:  [13-19]   BP: (109-224)/()   SpO2:  [95 %-100 %]       Intake/Output Summary (Last 24 hours) at 03/20/18 1155  Last data filed at 03/19/18 2300   Gross per 24 hour   Intake              100 ml   Output                0 ml   Net              100 ml       Physical Exam:  General appearance: weak but NAD  Eyes:  Conjunctivae/corneas clear. PERRL.  Lungs: Normal respiratory effort,   clear to auscultation bilaterally   Heart: Regular rate and rhythm, S1, S2 normal, no murmur, rub or ruma.  Abdomen: Soft, non-tender non-distended; bowel sounds normal; no masses,  no organomegaly  Extremities: No cyanosis or clubbing. No edema.    Skin: Skin color, texture, turgor normal. No rashes or lesions  Neurologic: Normal strength and tone. No focal numbness or weakness       Laboratory:    Recent Labs  Lab 03/20/18  0526   WBC 3.51*   RBC 3.38*   HGB 8.6*   HCT 26.7*      MCV 79*   MCH 25.4*   MCHC 32.2     BMP:   Recent Labs  Lab 03/20/18  0526   GLU 79   *   K 4.0      CO2 22*   BUN 66*   CREATININE 2.9*   CALCIUM 8.7   MG 1.6     Lab Results   Component Value Date    CALCIUM 8.7 03/20/2018    PHOS 4.2 03/20/2018     BNP  No results for input(s): BNP, BNPTRIAGEBLO in the last 168 hours.No results found for: URICACID  Lab Results   Component Value Date    IRON 31 11/29/2017    TIBC 317 11/29/2017    FERRITIN 202 11/29/2017     Lab Results   Component Value Date    .0 (H) 11/21/2016    CALCIUM 8.7 03/20/2018    PHOS 4.2 03/20/2018       Diagnostic Results:  US Renal Artery Stenosis Hyperten (xpd)    (Results Pending)       ASSESSMENT/PLAN:     1. MARYAM on CKD IV (Baseline variable 1.8)  likely due to multiple factors including dehydration, fluctuating BP, uni-nephric while on ARB (N17.9, N18.4, Z90.5, I70.1):  Followed by Dr. Epperson at [facility].  Recently started on inspra for hyperaldo and has been feeling poorly since with variable BP's at home.  Prior renal arterial doppler with some possible tardus parvus waveform and poor visualization of the ostium and ordered repeat US this am. This study does not show these findings and I have asked Leonora THOMAS as well to also review these studies to see if there is any suggestion of FRAN in this uninephric patient. He has reviewed the films and does not feel there is suggestion of FRAN. In the absence of an absolute study such as CTA/ Angiogram we cannot say with 100% certainty, however the risk of these test with MARYAM is high and current evidence does not support this. For now recommend holding ARB/inspra for now. Hold Verapimil and Labetalol.Would resume Eplerenone at 50 mg bid and hold other meds for now. Can use prn dosing of hydralazine or nitrates. Will give gentle IVF's for dehydration.  Follow trends.  Renally dose meds, avoid nephrotoxins, and monitor I/O's closely.  2. Hyperaldosteronism (E26.9): as above    3. Acc HTN (I12.9):  Wide fluctuations in BP.  Use hydralazine with hold parameters.    4. Bradycardia (R00.1):  Pt given labetolol and verapamil in ED and developed evelyn (HR 30-40s).  Keep on tele.  Hold agents.    5. SLE (M32.9):  Continue plaquenil.  Defer. Check anti-histone just due to symptoms and she has been given hydralazine.  6. Weight loss (R63.4):  Needs w/up by PCP.      Thanks for consult  See above  Will follow

## 2018-03-20 NOTE — ASSESSMENT & PLAN NOTE
Currently normotensive    Continue labetalol, irbesartan, verapamil, hydralazine, clonidine  Hold inspira

## 2018-03-20 NOTE — PLAN OF CARE
Problem: Patient Care Overview  Goal: Plan of Care Review  Outcome: Ongoing (interventions implemented as appropriate)  Plan of care reviewed with patient.  Cardiac monitoring maintained.  VSS.  SCDs on.  AM hydralazine held per order due to low BP.  Patient ambulates with stand by assist, but states she feels weak and light headed.  Call bell within reach, bed alarm set.  Purposeful rounding completed.  Will continue to monitor.

## 2018-03-20 NOTE — PLAN OF CARE
CM met with pt for initial discharge planning assessment. Pt states she lives alone, but her grandson is staying with her for a while.  Pt has a RW and cane, but states she can't do anything for herself now.   CM discussed SNF and pt is interested. Current choices are [facility] and [facility]. Pt and daughter will provide additional choices after reviewing Community resource guide.  CM to initiate required paperwork.     03/20/18 2895   Discharge Assessment   Assessment Type Discharge Planning Assessment   Confirmed/corrected address and phone number on facesheet? Yes   Assessment information obtained from? Patient;Medical Record   Expected Length of Stay (days) 2   Prior to hospitilization cognitive status: Alert/Oriented   Prior to hospitalization functional status: Needs Assistance;Assistive Equipment   Current cognitive status: Alert/Oriented   Current Functional Status: Needs Assistance;Assistive Equipment   Lives With alone   Able to Return to Prior Arrangements unable to determine at this time (comments)   Is patient able to care for self after discharge? Unable to determine at this time (comments)   Patient's perception of discharge disposition skilled nursing facility   Readmission Within The Last 30 Days no previous admission in last 30 days   Patient currently being followed by outpatient case management? No   Patient currently receives any other outside agency services? No   Equipment Currently Used at Home walker, rolling;cane, straight   Do you have any problems affording any of your prescribed medications? No   Is the patient taking medications as prescribed? yes   Does the patient have transportation home? Yes   Transportation Available family or friend will provide   Does the patient receive services at the [facility]? No   Discharge Plan A Skilled Nursing Facility   Discharge Plan B Home Health;Home   Patient/Family In Agreement With Plan yes

## 2018-03-20 NOTE — HPI
Patient is a 72 year-old woman with poorly controlled hypertension, chronic kidney disease stage IV (baseline serum creatinine of 1.8 mg/dL), primary hyperaldosteronism who presented to the emergency department with fatigue, nausea, and vomiting which she reports started soon after starting eplerenone.  Laboratory studies revealed evidence of acute kidney injury and patient was admitted to the hospital for treatment.

## 2018-03-20 NOTE — SUBJECTIVE & OBJECTIVE
Past Medical History:   Diagnosis Date    Acute on chronic diastolic congestive heart failure 11/17/2017    Allergy     Anatomical narrow angle glaucoma     Anemia     States diagnosed about a month ago    Aortic atherosclerosis     Arthritis     Cataract     CHF (congestive heart failure)     Chronic kidney disease     Chronic sciatica of left side     Right lower back pain due to sciatica    Coronary artery disease     Depression     Dry eyes     GERD (gastroesophageal reflux disease)     History of colon cancer     Hyperlipidemia     Hypertension     Hypothyroidism     Left ventricular diastolic dysfunction with preserved systolic function     Lupus (systemic lupus erythematosus) 8/17/2012    Malnutrition 5/16/2017    Osteoarthritis of cervical spine 8/17/2012    Osteopenia     PAD (peripheral artery disease)        Past Surgical History:   Procedure Laterality Date    CARDIAC CATHETERIZATION  07/27/2011    x1    CHOLECYSTECTOMY  1999    COLON SURGERY  2000 & 2003    partial removal    COLONOSCOPY N/A 4/14/2016    Procedure: COLONOSCOPY;  Surgeon: Jose Steen MD;  Location: [facility] ([address] [place] [address]);  Service: Endoscopy;  Laterality: N/A;  prep 2 days prior light meals only/clear liquid day before  and 4 dulcolax tabs (5 mgs) at noon    COLONOSCOPY N/A 9/14/2017    Procedure: COLONOSCOPY;  Surgeon: Jose Steen MD;  Location: [facility] ([address] [place] [address]);  Service: Endoscopy;  Laterality: N/A;    EYE SURGERY      HAND SURGERY Bilateral 1996 & 1997    due to carpal tunnel     HERNIA REPAIR      HYSTERECTOMY  1983    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    Procardia [nifedipine] Nausea Only and Edema     Feet swelling and nausea, now reports that this has happened in the past and required lasix    Arthrotec 50 [diclofenac-misoprostol]      Unknown    Bentyl [dicyclomine]      Not effective    Doxycycline Nausea Only    Imdur [isosorbide mononitrate] Nausea Only    Lomotil [diphenoxylate-atropine] Nausea And Vomiting     Stomach cramps with vomitting    Lozol [indapamide] Rash    Norvasc [amlodipine]      Not tolerated    Tramadol Nausea Only       No current facility-administered medications on file prior to encounter.      Current Outpatient Prescriptions on File Prior to Encounter   Medication Sig    ammonium lactate 12 % Crea Apply 1 application topically 2 (two) times daily as needed.    aspirin (ECOTRIN) 81 MG EC tablet Take 1 tablet (81 mg total) by mouth once daily. (Patient taking differently: Take 81 mg by mouth. )    azelastine (ASTELIN) 137 mcg (0.1 %) nasal spray 1 spray (137 mcg total) by Nasal route 2 (two) times daily. (Patient taking differently: 1 spray by Nasal route as needed. )    citalopram (CELEXA) 20 MG tablet Take 1.5 tablets (30 mg total) by mouth once daily.    cloNIDine (CATAPRES) 0.2 MG tablet Take 1 tablet (0.2 mg total) by mouth 2 (two) times daily. (Patient taking differently: Take 0.1 mg by mouth once. )    conjugated estrogens (PREMARIN) vaginal cream Use 1g nightly for two weeks, then 1g twice per week. (Patient taking differently: as needed. Use 1g nightly for two weeks, then 1g twice per week.)    eplerenone (INSPRA) 50 MG Tab Take 1 tablet (50 mg total) by mouth once daily.    hydrALAZINE (APRESOLINE) 50 MG tablet Take 2 tablets (100 mg total) by mouth every 8 (eight) hours.    hydroxychloroquine (PLAQUENIL) 200 mg tablet TAKE 1 TABLET TWICE DAILY    ipratropium  (ATROVENT) 0.03 % nasal spray 2 sprays by Nasal route 2 (two) times daily as needed for Rhinitis.    irbesartan (AVAPRO) 300 MG tablet Take 1 tablet (300 mg total) by mouth once daily. please take at 9AM (Patient taking differently: Take 300 mg by mouth once daily. Takes 2 pills daily)    labetalol (NORMODYNE) 200 MG tablet 400mg in AM, 600mg in PM    latanoprost 0.005 % ophthalmic solution INSTILL 1 DROP INTO BOTH EYES EVERY DAY    levothyroxine (SYNTHROID) 75 MCG tablet TAKE 1 TABLET (75 MCG TOTAL) BY MOUTH BEFORE BREAKFAST.    meclizine (ANTIVERT) 12.5 mg tablet Take 1 tablet (12.5 mg total) by mouth 3 (three) times daily as needed.    nitroGLYCERIN (NITROSTAT) 0.4 MG SL tablet Place 1 tablet (0.4 mg total) under the tongue every 5 (five) minutes as needed for Chest pain.    omeprazole (PRILOSEC) 40 MG capsule Take 1 capsule (40 mg total) by mouth once daily. (Patient taking differently: Take 40 mg by mouth as needed. )    rosuvastatin (CRESTOR) 40 MG Tab TAKE 1 TABLET EVERY EVENING    triamcinolone acetonide 0.1% (KENALOG) 0.1 % paste PLACE ONTO TEETH TWICE DAILY (Patient taking differently: as needed. PLACE ONTO TEETH TWICE DAILY)    VITAMIN D2 50,000 unit capsule TAKE 1 CAPSULE (50,000 UNITS TOTAL) EVERY 30 DAYS    cholestyramine (QUESTRAN) 4 gram packet Take 1 packet (4 g total) by mouth 3 (three) times daily with meals.    cyproheptadine (PERIACTIN) 4 mg tablet Take 1 tablet (4 mg total) by mouth every evening.    multivitamin capsule Take 1 capsule by mouth once daily.    TENS units Sravanthi 1 application by Misc.(Non-Drug; Combo Route) route daily as needed (pain).    verapamil (CALAN-SR) 240 MG CR tablet Take 1 tablet (240 mg total) by mouth every evening.     Family History     Problem Relation (Age of Onset)    Diabetes Maternal Grandmother, Son, Maternal Aunt    Glaucoma Maternal Grandmother    Heart attack Mother    Heart disease Father    Hypertension Mother, Father, Sister, Brother     Kidney disease Brother    Kidney failure Brother    No Known Problems Daughter, Maternal Grandfather, Paternal Grandmother, Paternal Grandfather        Social History Main Topics    Smoking status: Former Smoker     Packs/day: 1.50     Years: 30.00     Types: Cigarettes     Start date: 1955     Quit date: 3/13/1985    Smokeless tobacco: Never Used    Alcohol use No    Drug use: No    Sexual activity: Not Currently     Partners: Male     Birth control/ protection: Abstinence     Review of Systems   Constitutional: Positive for activity change and fatigue. Negative for appetite change and fever.   HENT: Negative for congestion, ear pain, rhinorrhea and sinus pressure.    Eyes: Negative for pain and discharge.   Respiratory: Negative for cough, chest tightness, shortness of breath and wheezing.    Cardiovascular: Negative for chest pain and leg swelling.   Gastrointestinal: Negative for abdominal distention, abdominal pain, diarrhea, nausea and vomiting.   Endocrine: Negative for cold intolerance and heat intolerance.   Genitourinary: Negative for difficulty urinating, flank pain, frequency, hematuria and urgency.   Musculoskeletal: Negative for arthralgias, joint swelling and myalgias.   Allergic/Immunologic: Negative for environmental allergies and food allergies.   Neurological: Positive for dizziness, weakness and light-headedness. Negative for headaches.   Hematological: Does not bruise/bleed easily.   Psychiatric/Behavioral: Negative for agitation, behavioral problems and decreased concentration.     Objective:     Vital Signs (Most Recent):  Temp: 98.7 °F (37.1 °C) (03/19/18 2334)  Pulse: (!) 44 (03/20/18 0000)  Resp: 18 (03/19/18 2334)  BP: (!) 122/56 (03/19/18 2334)  SpO2: 95 % (03/19/18 2334) Vital Signs (24h Range):  Temp:  [97.8 °F (36.6 °C)-98.7 °F (37.1 °C)] 98.7 °F (37.1 °C)  Pulse:  [44-68] 44  Resp:  [13-19] 18  SpO2:  [95 %-100 %] 95 %  BP: (122-224)/() 122/56     Weight: 46.3 kg (102  lb)  Body mass index is 18.66 kg/m².    Physical Exam   Constitutional: She is oriented to person, place, and time. She appears well-developed and well-nourished.   HENT:   Head: Normocephalic.   Eyes: Conjunctivae are normal. Right eye exhibits no discharge. Left eye exhibits no discharge.   Neck: Normal range of motion. Neck supple.   Cardiovascular: Regular rhythm, normal heart sounds and intact distal pulses.  Bradycardia present.    Pulses:       Radial pulses are 2+ on the right side, and 2+ on the left side.        Dorsalis pedis pulses are 1+ on the right side, and 1+ on the left side.   Pulmonary/Chest: Effort normal and breath sounds normal. No respiratory distress.   Abdominal: Soft. Bowel sounds are normal. She exhibits no distension. There is no tenderness.   Musculoskeletal: Normal range of motion.   Neurological: She is alert and oriented to person, place, and time. She has normal strength. GCS eye subscore is 4. GCS verbal subscore is 5. GCS motor subscore is 6.   Skin: Skin is warm and dry.   Psychiatric: She has a normal mood and affect. Her speech is normal and behavior is normal. Thought content normal.           Significant Labs:   CBC:   Recent Labs  Lab 03/19/18  1853   WBC 3.78*   HGB 10.3*   HCT 32.0*        CMP:   Recent Labs  Lab 03/19/18  1853   *   K 3.9   CL 99   CO2 26   GLU 92   BUN 70*   CREATININE 2.8*   CALCIUM 9.5   PROT 7.5   ALBUMIN 3.1*   BILITOT 0.3   ALKPHOS 91   AST 40   ALT 20   ANIONGAP 7*   EGFRNONAA 16*       Significant Imaging: I have reviewed all pertinent imaging results/findings within the past 24 hours.

## 2018-03-20 NOTE — H&P
[facility] Hospital Medicine  History & Physical    Patient Name: Ewelina Arnold  MRN: [id number]  Admission Date: 3/19/2018  Attending Physician: Loretta Anthony MD   Primary Care Provider: Kalie Walton MD         Patient information was obtained from patient, past medical records and ER records.     Subjective:     Principal Problem:Malignant hypertension    Chief Complaint:   Chief Complaint   Patient presents with    Fatigue     + increased generalzied weakness x 1 week. Pt reports + intermittent unsteady gait w/ several falls within this week. Denies hitting head or LOC at time of fall. No numbnes/tinlging to extremities.         HPI: This is a 72 y.o. Female, with history of HTN and CKD, who presents with complaint of fatigue that has been constant for approximately six days. Patient reports that six days ago she began taking Inspra to treat her HTN and stopped taking Torcemide. She reports that since this medication change she has been feeling fatigue accompanied by weakness, decreased appetite, nausea, and vomiting. These symptoms are similar to an episode three months ago for which she was admitted to [facility] for eleven days. During this hospital admission, patient was treated and symptoms had resolved. She was discharged on 1/26/2018. She reports during this admission she was diagnosed with hyperaldosteronism. Patient denies diarrhea, constipation, shortness of breath, or chest pain. Patient also denies use of alcohol, tobacco, or illicit drugs. She reports that her primary care provider is Dr. Snyder and her nephrologist is Dr. Diallo.     Past Medical History:   Diagnosis Date    Acute on chronic diastolic congestive heart failure 11/17/2017    Allergy     Anatomical narrow angle glaucoma     Anemia     States diagnosed about a month ago    Aortic atherosclerosis     Arthritis     Cataract     CHF (congestive heart failure)     Chronic kidney disease     Chronic  sciatica of left side     Right lower back pain due to sciatica    Coronary artery disease     Depression     Dry eyes     GERD (gastroesophageal reflux disease)     History of colon cancer     Hyperlipidemia     Hypertension     Hypothyroidism     Left ventricular diastolic dysfunction with preserved systolic function     Lupus (systemic lupus erythematosus) 8/17/2012    Malnutrition 5/16/2017    Osteoarthritis of cervical spine 8/17/2012    Osteopenia     PAD (peripheral artery disease)        Past Surgical History:   Procedure Laterality Date    CARDIAC CATHETERIZATION  07/27/2011    x1    CHOLECYSTECTOMY  1999    COLON SURGERY  2000 & 2003    partial removal    COLONOSCOPY N/A 4/14/2016    Procedure: COLONOSCOPY;  Surgeon: Jose Steen MD;  [facility]  Service: Endoscopy;  Laterality: N/A;  prep 2 days prior light meals only/clear liquid day before  and 4 dulcolax tabs (5 mgs) at noon    COLONOSCOPY N/A 9/14/2017    Procedure: COLONOSCOPY;  Surgeon: Jose Steen MD;  [facility]  Service: Endoscopy;  Laterality: N/A;    EYE SURGERY      HAND SURGERY Bilateral 1996 & 1997    due to carpal tunnel     HERNIA REPAIR      HYSTERECTOMY  1983    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    Procardia [nifedipine] Nausea Only and Edema     Feet swelling and nausea, now reports that this has happened in the past and required lasix    Arthrotec 50 [diclofenac-misoprostol]       Unknown    Bentyl [dicyclomine]      Not effective    Doxycycline Nausea Only    Imdur [isosorbide mononitrate] Nausea Only    Lomotil [diphenoxylate-atropine] Nausea And Vomiting     Stomach cramps with vomitting    Lozol [indapamide] Rash    Norvasc [amlodipine]      Not tolerated    Tramadol Nausea Only       No current facility-administered medications on file prior to encounter.      Current Outpatient Prescriptions on File Prior to Encounter   Medication Sig    ammonium lactate 12 % Crea Apply 1 application topically 2 (two) times daily as needed.    aspirin (ECOTRIN) 81 MG EC tablet Take 1 tablet (81 mg total) by mouth once daily. (Patient taking differently: Take 81 mg by mouth. )    azelastine (ASTELIN) 137 mcg (0.1 %) nasal spray 1 spray (137 mcg total) by Nasal route 2 (two) times daily. (Patient taking differently: 1 spray by Nasal route as needed. )    citalopram (CELEXA) 20 MG tablet Take 1.5 tablets (30 mg total) by mouth once daily.    cloNIDine (CATAPRES) 0.2 MG tablet Take 1 tablet (0.2 mg total) by mouth 2 (two) times daily. (Patient taking differently: Take 0.1 mg by mouth once. )    conjugated estrogens (PREMARIN) vaginal cream Use 1g nightly for two weeks, then 1g twice per week. (Patient taking differently: as needed. Use 1g nightly for two weeks, then 1g twice per week.)    eplerenone (INSPRA) 50 MG Tab Take 1 tablet (50 mg total) by mouth once daily.    hydrALAZINE (APRESOLINE) 50 MG tablet Take 2 tablets (100 mg total) by mouth every 8 (eight) hours.    hydroxychloroquine (PLAQUENIL) 200 mg tablet TAKE 1 TABLET TWICE DAILY    ipratropium (ATROVENT) 0.03 % nasal spray 2 sprays by Nasal route 2 (two) times daily as needed for Rhinitis.    irbesartan (AVAPRO) 300 MG tablet Take 1 tablet (300 mg total) by mouth once daily. please take at 9AM (Patient taking differently: Take 300 mg by mouth once daily. Takes 2 pills daily)    labetalol (NORMODYNE) 200 MG tablet 400mg in AM,  600mg in PM    latanoprost 0.005 % ophthalmic solution INSTILL 1 DROP INTO BOTH EYES EVERY DAY    levothyroxine (SYNTHROID) 75 MCG tablet TAKE 1 TABLET (75 MCG TOTAL) BY MOUTH BEFORE BREAKFAST.    meclizine (ANTIVERT) 12.5 mg tablet Take 1 tablet (12.5 mg total) by mouth 3 (three) times daily as needed.    nitroGLYCERIN (NITROSTAT) 0.4 MG SL tablet Place 1 tablet (0.4 mg total) under the tongue every 5 (five) minutes as needed for Chest pain.    omeprazole (PRILOSEC) 40 MG capsule Take 1 capsule (40 mg total) by mouth once daily. (Patient taking differently: Take 40 mg by mouth as needed. )    rosuvastatin (CRESTOR) 40 MG Tab TAKE 1 TABLET EVERY EVENING    triamcinolone acetonide 0.1% (KENALOG) 0.1 % paste PLACE ONTO TEETH TWICE DAILY (Patient taking differently: as needed. PLACE ONTO TEETH TWICE DAILY)    VITAMIN D2 50,000 unit capsule TAKE 1 CAPSULE (50,000 UNITS TOTAL) EVERY 30 DAYS    cholestyramine (QUESTRAN) 4 gram packet Take 1 packet (4 g total) by mouth 3 (three) times daily with meals.    cyproheptadine (PERIACTIN) 4 mg tablet Take 1 tablet (4 mg total) by mouth every evening.    multivitamin capsule Take 1 capsule by mouth once daily.    TENS units Sravanthi 1 application by Misc.(Non-Drug; Combo Route) route daily as needed (pain).    verapamil (CALAN-SR) 240 MG CR tablet Take 1 tablet (240 mg total) by mouth every evening.     Family History     Problem Relation (Age of Onset)    Diabetes Maternal Grandmother, Son, Maternal Aunt    Glaucoma Maternal Grandmother    Heart attack Mother    Heart disease Father    Hypertension Mother, Father, Sister, Brother    Kidney disease Brother    Kidney failure Brother    No Known Problems Daughter, Maternal Grandfather, Paternal Grandmother, Paternal Grandfather        Social History Main Topics    Smoking status: Former Smoker     Packs/day: 1.50     Years: 30.00     Types: Cigarettes     Start date: 1955     Quit date: 3/13/1985    Smokeless tobacco:  Never Used    Alcohol use No    Drug use: No    Sexual activity: Not Currently     Partners: Male     Birth control/ protection: Abstinence     Review of Systems   Constitutional: Positive for activity change and fatigue. Negative for appetite change and fever.   HENT: Negative for congestion, ear pain, rhinorrhea and sinus pressure.    Eyes: Negative for pain and discharge.   Respiratory: Negative for cough, chest tightness, shortness of breath and wheezing.    Cardiovascular: Negative for chest pain and leg swelling.   Gastrointestinal: Negative for abdominal distention, abdominal pain, diarrhea, nausea and vomiting.   Endocrine: Negative for cold intolerance and heat intolerance.   Genitourinary: Negative for difficulty urinating, flank pain, frequency, hematuria and urgency.   Musculoskeletal: Negative for arthralgias, joint swelling and myalgias.   Allergic/Immunologic: Negative for environmental allergies and food allergies.   Neurological: Positive for dizziness, weakness and light-headedness. Negative for headaches.   Hematological: Does not bruise/bleed easily.   Psychiatric/Behavioral: Negative for agitation, behavioral problems and decreased concentration.     Objective:     Vital Signs (Most Recent):  Temp: 98.7 °F (37.1 °C) (03/19/18 2334)  Pulse: (!) 44 (03/20/18 0000)  Resp: 18 (03/19/18 2334)  BP: (!) 122/56 (03/19/18 2334)  SpO2: 95 % (03/19/18 2334) Vital Signs (24h Range):  Temp:  [97.8 °F (36.6 °C)-98.7 °F (37.1 °C)] 98.7 °F (37.1 °C)  Pulse:  [44-68] 44  Resp:  [13-19] 18  SpO2:  [95 %-100 %] 95 %  BP: (122-224)/() 122/56     Weight: 46.3 kg (102 lb)  Body mass index is 18.66 kg/m².    Physical Exam   Constitutional: She is oriented to person, place, and time. She appears well-developed and well-nourished.   HENT:   Head: Normocephalic.   Eyes: Conjunctivae are normal. Right eye exhibits no discharge. Left eye exhibits no discharge.   Neck: Normal range of motion. Neck supple.    Cardiovascular: Regular rhythm, normal heart sounds and intact distal pulses.  Bradycardia present.    Pulses:       Radial pulses are 2+ on the right side, and 2+ on the left side.        Dorsalis pedis pulses are 1+ on the right side, and 1+ on the left side.   Pulmonary/Chest: Effort normal and breath sounds normal. No respiratory distress.   Abdominal: Soft. Bowel sounds are normal. She exhibits no distension. There is no tenderness.   Musculoskeletal: Normal range of motion.   Neurological: She is alert and oriented to person, place, and time. She has normal strength. GCS eye subscore is 4. GCS verbal subscore is 5. GCS motor subscore is 6.   Skin: Skin is warm and dry.   Psychiatric: She has a normal mood and affect. Her speech is normal and behavior is normal. Thought content normal.           Significant Labs:   CBC:   Recent Labs  Lab 03/19/18  1853   WBC 3.78*   HGB 10.3*   HCT 32.0*        CMP:   Recent Labs  Lab 03/19/18  1853   *   K 3.9   CL 99   CO2 26   GLU 92   BUN 70*   CREATININE 2.8*   CALCIUM 9.5   PROT 7.5   ALBUMIN 3.1*   BILITOT 0.3   ALKPHOS 91   AST 40   ALT 20   ANIONGAP 7*   EGFRNONAA 16*       Significant Imaging: I have reviewed all pertinent imaging results/findings within the past 24 hours.    Assessment/Plan:     * Malignant hypertension    Currently normotensive    Continue labetalol, irbesartan, verapamil, hydralazine, clonidine  Hold inspira        Bradycardia    Patient was given 600 mg of Labetalol today and had her HR drop to 45.    Hold parameters assigned.  Nephrology assistance with titrating BP meds with current renal function          Chronic kidney disease, stage IV (severe)    Creatinine 2.8, baseline between 2 and 3 for the last 5 months.    Consult Nephrology          Mixed hyperlipidemia    Lipid Panel pending  Continue Crestor          Postsurgical hypothyroidism    Continue levothyroxine            VTE Risk Mitigation         Ordered     Place  sequential compression device  Until discontinued      03/19/18 2056             Jayden Magana NP  Department of Hospital Medicine   [facility]

## 2018-03-21 PROBLEM — I50.32 CHRONIC DIASTOLIC CONGESTIVE HEART FAILURE: Status: ACTIVE | Noted: 2018-03-21

## 2018-03-21 PROBLEM — E26.9 HYPERALDOSTERONISM: Status: ACTIVE | Noted: 2018-03-21

## 2018-03-21 LAB
ALBUMIN SERPL BCP-MCNC: 2.6 G/DL
ALP SERPL-CCNC: 80 U/L
ALT SERPL W/O P-5'-P-CCNC: 17 U/L
ANION GAP SERPL CALC-SCNC: 8 MMOL/L
AST SERPL-CCNC: 31 U/L
BASOPHILS # BLD AUTO: 0.02 K/UL
BASOPHILS NFR BLD: 0.4 %
BILIRUB SERPL-MCNC: 0.2 MG/DL
BUN SERPL-MCNC: 64 MG/DL
CALCIUM SERPL-MCNC: 8.6 MG/DL
CHLORIDE SERPL-SCNC: 106 MMOL/L
CO2 SERPL-SCNC: 20 MMOL/L
CREAT SERPL-MCNC: 2.4 MG/DL
DIFFERENTIAL METHOD: ABNORMAL
EOSINOPHIL # BLD AUTO: 0 K/UL
EOSINOPHIL NFR BLD: 0.9 %
ERYTHROCYTE [DISTWIDTH] IN BLOOD BY AUTOMATED COUNT: 13.8 %
EST. GFR  (AFRICAN AMERICAN): 23 ML/MIN/1.73 M^2
EST. GFR  (NON AFRICAN AMERICAN): 20 ML/MIN/1.73 M^2
GLUCOSE SERPL-MCNC: 77 MG/DL
HCT VFR BLD AUTO: 26.9 %
HGB BLD-MCNC: 8.6 G/DL
LYMPHOCYTES # BLD AUTO: 1.3 K/UL
LYMPHOCYTES NFR BLD: 28 %
MAGNESIUM SERPL-MCNC: 1.7 MG/DL
MCH RBC QN AUTO: 25.6 PG
MCHC RBC AUTO-ENTMCNC: 32 G/DL
MCV RBC AUTO: 80 FL
MONOCYTES # BLD AUTO: 0.7 K/UL
MONOCYTES NFR BLD: 15 %
NEUTROPHILS # BLD AUTO: 2.6 K/UL
NEUTROPHILS NFR BLD: 55.5 %
PHOSPHATE SERPL-MCNC: 3.7 MG/DL
PLATELET # BLD AUTO: 189 K/UL
PMV BLD AUTO: 9.7 FL
POTASSIUM SERPL-SCNC: 4.2 MMOL/L
PROT SERPL-MCNC: 6.1 G/DL
RBC # BLD AUTO: 3.36 M/UL
SODIUM SERPL-SCNC: 134 MMOL/L
WBC # BLD AUTO: 4.6 K/UL

## 2018-03-21 PROCEDURE — 97535 SELF CARE MNGMENT TRAINING: CPT

## 2018-03-21 PROCEDURE — 63600175 PHARM REV CODE 636 W HCPCS: Performed by: INTERNAL MEDICINE

## 2018-03-21 PROCEDURE — 11000001 HC ACUTE MED/SURG PRIVATE ROOM

## 2018-03-21 PROCEDURE — 84100 ASSAY OF PHOSPHORUS: CPT

## 2018-03-21 PROCEDURE — 25000003 PHARM REV CODE 250: Performed by: NURSE PRACTITIONER

## 2018-03-21 PROCEDURE — 36415 COLL VENOUS BLD VENIPUNCTURE: CPT

## 2018-03-21 PROCEDURE — 25000003 PHARM REV CODE 250: Performed by: INTERNAL MEDICINE

## 2018-03-21 PROCEDURE — 99233 SBSQ HOSP IP/OBS HIGH 50: CPT | Mod: ,,, | Performed by: INTERNAL MEDICINE

## 2018-03-21 PROCEDURE — 83735 ASSAY OF MAGNESIUM: CPT

## 2018-03-21 PROCEDURE — 63600175 PHARM REV CODE 636 W HCPCS: Performed by: NURSE PRACTITIONER

## 2018-03-21 PROCEDURE — 80053 COMPREHEN METABOLIC PANEL: CPT

## 2018-03-21 PROCEDURE — 97165 OT EVAL LOW COMPLEX 30 MIN: CPT

## 2018-03-21 PROCEDURE — 85025 COMPLETE CBC W/AUTO DIFF WBC: CPT

## 2018-03-21 RX ORDER — HYDRALAZINE HYDROCHLORIDE 20 MG/ML
10 INJECTION INTRAMUSCULAR; INTRAVENOUS EVERY 6 HOURS PRN
Status: DISCONTINUED | OUTPATIENT
Start: 2018-03-21 | End: 2018-03-28 | Stop reason: HOSPADM

## 2018-03-21 RX ORDER — NIFEDIPINE 30 MG/1
30 TABLET, EXTENDED RELEASE ORAL DAILY
Status: DISCONTINUED | OUTPATIENT
Start: 2018-03-21 | End: 2018-03-23

## 2018-03-21 RX ADMIN — LEVOTHYROXINE SODIUM 75 MCG: 75 TABLET ORAL at 06:03

## 2018-03-21 RX ADMIN — EPLERENONE 50 MG: 25 TABLET ORAL at 09:03

## 2018-03-21 RX ADMIN — LATANOPROST 1 DROP: 50 SOLUTION OPHTHALMIC at 10:03

## 2018-03-21 RX ADMIN — HEPARIN SODIUM 5000 UNITS: 5000 INJECTION, SOLUTION INTRAVENOUS; SUBCUTANEOUS at 06:03

## 2018-03-21 RX ADMIN — ROSUVASTATIN CALCIUM 10 MG: 10 TABLET, FILM COATED ORAL at 09:03

## 2018-03-21 RX ADMIN — NIFEDIPINE 30 MG: 30 TABLET, FILM COATED, EXTENDED RELEASE ORAL at 10:03

## 2018-03-21 RX ADMIN — ASPIRIN 81 MG: 81 TABLET, COATED ORAL at 10:03

## 2018-03-21 RX ADMIN — HYDRALAZINE HYDROCHLORIDE 10 MG: 20 INJECTION INTRAMUSCULAR; INTRAVENOUS at 06:03

## 2018-03-21 RX ADMIN — FAMOTIDINE 20 MG: 20 TABLET, FILM COATED ORAL at 10:03

## 2018-03-21 RX ADMIN — HYDROXYCHLOROQUINE SULFATE 200 MG: 200 TABLET, FILM COATED ORAL at 10:03

## 2018-03-21 RX ADMIN — HYDROXYCHLOROQUINE SULFATE 200 MG: 200 TABLET, FILM COATED ORAL at 09:03

## 2018-03-21 RX ADMIN — HYDRALAZINE HYDROCHLORIDE 10 MG: 20 INJECTION INTRAMUSCULAR; INTRAVENOUS at 02:03

## 2018-03-21 RX ADMIN — HEPARIN SODIUM 5000 UNITS: 5000 INJECTION, SOLUTION INTRAVENOUS; SUBCUTANEOUS at 02:03

## 2018-03-21 RX ADMIN — CITALOPRAM HYDROBROMIDE 30 MG: 20 TABLET ORAL at 09:03

## 2018-03-21 RX ADMIN — ACETAMINOPHEN 650 MG: 325 TABLET ORAL at 11:03

## 2018-03-21 NOTE — PLAN OF CARE
Problem: Occupational Therapy Goal  Goal: Occupational Therapy Goal  Outcome: Outcome(s) achieved Date Met: 03/21/18  OT evaluation completed and treatment initiated. Educated pt on importance of OOB activity and overall activity/excersise for endurance and independence in functional mobility and ADL's.  Pt presents with sufficient safety and independence in ADL's and functional mobility and no longer requires acute OT services at this time. Recommending home with home health OT/PT and bath bench upon discharge.

## 2018-03-21 NOTE — PT/OT/SLP EVAL
"Occupational Therapy   Evaluation and Discharge Note    Name: Ewelina Arnold  MRN: [id number]  Admitting Diagnosis:  Malignant hypertension      Recommendations:     Discharge Recommendations: home with home health (although pt may decline services)  Discharge Equipment Recommendations:  bath bench  Barriers to discharge:  Inaccessible home environment    History:     Occupational Profile:  Living Environment: Pt reports she lives in a single story home with 7 KHOI and bilateral handrails that are close together. She reports her grandson is staying with her temporarily and will probably stay there for the next month to month and a half. She has a tub/shower combo with grab bars and an elevated toilet.   Previous level of function: Pt reports being (I) with all ADL's and uses a rolling walker for functional mobility around her house and in the community.  Pt's grandson works at night and is home during the day usually sleeping. Pt's daughter provides most of her transportation, delivers groceries, and cooks and cleans for her.   Roles and Routines: Pt reports prior to "not feeling well" about a month ago she was driving short distances, going to the gym ([employer]) and doing classes there and usually watches TV during the day.  Equipment Owned:  walker, rolling  Assistance upon Discharge: Pt will have her daughter and grandson to assist as necessary.     Past Medical History:   Diagnosis Date    Acute on chronic diastolic congestive heart failure 11/17/2017    Allergy     Anatomical narrow angle glaucoma     Anemia     States diagnosed about a month ago    Aortic atherosclerosis     Arthritis     Cataract     CHF (congestive heart failure)     Chronic kidney disease     Chronic sciatica of left side     Right lower back pain due to sciatica    Coronary artery disease     Depression     Dry eyes     GERD (gastroesophageal reflux disease)     History of colon cancer     Hyperaldosteronism     " Hyperlipidemia     Hypertension     Hypothyroidism     Left ventricular diastolic dysfunction with preserved systolic function     Lupus (systemic lupus erythematosus) 8/17/2012    Malnutrition 5/16/2017    Osteoarthritis of cervical spine 8/17/2012    Osteopenia     PAD (peripheral artery disease)     FRAN (renal artery stenosis)        Past Surgical History:   Procedure Laterality Date    CARDIAC CATHETERIZATION  07/27/2011    x1    CHOLECYSTECTOMY  1999    COLON SURGERY  2000 & 2003    partial removal    COLONOSCOPY N/A 4/14/2016    Procedure: COLONOSCOPY;  Surgeon: Jose Steen MD;  [facility]  Service: Endoscopy;  Laterality: N/A;  prep 2 days prior light meals only/clear liquid day before  and 4 dulcolax tabs (5 mgs) at noon    COLONOSCOPY N/A 9/14/2017    Procedure: COLONOSCOPY;  Surgeon: Jose Steen MD;  [facility]  Service: Endoscopy;  Laterality: N/A;    EYE SURGERY      HAND SURGERY Bilateral 1996 & 1997    due to carpal tunnel     HERNIA REPAIR      HYSTERECTOMY  1983    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   Patient/Family stated goals: Pt is eager to return home.   Communicated with: nursing prior to session.  Pain/Comfort:  · Pain Rating 1: 0/10  · Pain Rating 2: 0/10    Patients cultural, spiritual, Quaker conflicts given the current situation: none specified    Objective:     Patient found with: SCD, peripheral IV    General Precautions: Standard,  (activity as tolerated, fall)   Orthopedic Precautions:N/A   Braces: N/A     Occupational Performance:    Bed Mobility:    · Patient completed Supine to Sit with modified  independence    Functional Mobility/Transfers:  · Patient completed Sit <> Stand Transfer with modified independence  with  no assistive device   · Functional Mobility: with no DME around hospital room Supervision - MOD I with no LOB.     Activities of Daily Living:  · Feeding:  independence .  · Grooming: modified independence standing at sink with no LOB or noted fatigue  · LB Dressing: modified independence donning and doffing socks    Cognitive/Visual Perceptual:  Cognitive/Psychosocial Skills:     -       Oriented to: Person, Place and Time   -       Follows Commands/attention:Follows multistep  commands  -       Communication: clear/fluent  -       Memory: No Deficits noted  -       Safety awareness/insight to disability: impaired   -       Mood/Affect/Coping skills/emotional control: Appropriate to situation  Visual/Perceptual:      -Intact    Physical Exam:  Balance:    -       good static and dynamic standing   Postural examination/scapula alignment:    -       Rounded shoulders  -       Forward head  Skin integrity: Visible skin intact  Edema:  None noted  Sensation:    -       Intact  Motor Planning:    -       intact  Dominant hand:    -       right  Upper Extremity Range of Motion:     -       Right Upper Extremity: WFL  -       Left Upper Extremity: WFL  Upper Extremity Strength:    -       Right Upper Extremity: WFL  -       Left Upper Extremity: WFL   Strength:    -       Right Upper Extremity: WFL  -       Left Upper Extremity: WFL  Fine Motor Coordination:    -       Intact  Gross motor coordination:   WFL    Patient left up in chair with call button in reach and nursing notified    AMPAC 6 Click:  AMPAC Total Score: 23    Treatment & Education:  Bed mobility, functional mobility and standing balance and tolerance with ADL's,  education on light exercises for overall activity tolerance and endurance, education on OT role and POC.   Education:    Assessment:     Ewelina Arnold is a 72 y.o. female  "with a medical diagnosis of Malignant hypertension. OT evaluation completed and treatment initiated. Educated pt on importance of OOB activity and overall activity/excersise for endurance and independence in functional mobility and ADL's.  Pt presents with sufficient safety and independence in ADL's and functional mobility and no longer requires acute OT services at this time. Recommending home with home health OT/PT and bath bench upon discharge.     Clinical Decision Makin.  OT Low:  "Pt evaluation falls under low complexity for evaluation coding due to performance deficits noted in 1-3 areas as stated above and 0 co-morbities affecting current functional status. Data obtained from problem focused assessments. No modifications or assistance was required for completion of evaluation. Only brief occupational profile and history review completed."     Plan:     During this hospitalization, patient does not require further acute OT services.  Please re-consult if situation changes.    · Plan of Care Reviewed with: patient    This Plan of care has been discussed with the patient who was involved in its development and understands and is in agreement with the identified goals and treatment plan    GOALS:    Occupational Therapy Goals     Not on file          Multidisciplinary Problems (Resolved)        Problem: Occupational Therapy Goal    Goal Priority Disciplines Outcome Interventions   Occupational Therapy Goal   (Resolved)     OT, PT/OT Outcome(s) achieved                    Time Tracking:     OT Date of Treatment: 18  OT Start Time: 1158  OT Stop Time: 1225  OT Total Time (min): 27 min    Billable Minutes:Evaluation 15  Self Care/Home Management 12    Pierce Wheat OT  3/21/2018    "

## 2018-03-21 NOTE — PLAN OF CARE
Problem: Patient Care Overview  Goal: Plan of Care Review  Patient free of trauma, falls and injury. Pt VSS and afebrile throughout shift. Pt free of skin breakdown. Pt dressing clean, dry and intact. Patient pain has been moderately controlled by PO pain meds and tolerated well. Pt has been eating well and voiding adequately throughout shift without difficulty. Pt has call light in reach, bed alarm on, bed brakes on, side rails up x3, bed in low position, TEDs/SCDs on, IS at bedside, and nonskid socks on. Pt lying in bed in no distress. Will continue to monitor until handoff.

## 2018-03-21 NOTE — NURSING
Pt reluctant to take Eplerenone, due to side effect. Pt was educated on MD orders and encouraged while in a medical setting. Pt agreed, and verbalized understanding. Call light within reach. WCTM

## 2018-03-21 NOTE — PLAN OF CARE
Problem: Patient Care Overview  Goal: Plan of Care Review  Outcome: Ongoing (interventions implemented as appropriate)  Pts VS stable throughout this shift. No acute distress noted. pt updated on POC and  verbalized understanding. Hourly rounding done . Bed locked and in lowest positon. Call light within reach. WCTM

## 2018-03-21 NOTE — SUBJECTIVE & OBJECTIVE
Interval History:   Pt was restarted on eplerenone - so far has not has any adverse effect. bp remains elevated    Pt had renal US - no evidence of FRAN    Renal function better    Review of Systems   Constitutional: Negative for fatigue.   Respiratory: Negative for shortness of breath.    Cardiovascular: Negative for chest pain.     Objective:     Vital Signs (Most Recent):  Temp: 97.7 °F (36.5 °C) (03/21/18 0728)  Pulse: 74 (03/21/18 0728)  Resp: 16 (03/21/18 0728)  BP: (!) 193/84 (03/21/18 0728)  SpO2: 97 % (03/21/18 0728) Vital Signs (24h Range):  Temp:  [97.7 °F (36.5 °C)-98.9 °F (37.2 °C)] 97.7 °F (36.5 °C)  Pulse:  [57-78] 74  Resp:  [16-20] 16  SpO2:  [97 %-99 %] 97 %  BP: (165-204)/(70-86) 193/84     Weight: 46.3 kg (102 lb)  Body mass index is 18.66 kg/m².    Intake/Output Summary (Last 24 hours) at 03/21/18 1000  Last data filed at 03/20/18 1855   Gross per 24 hour   Intake           553.35 ml   Output              475 ml   Net            78.35 ml      Physical Exam    Significant Labs:   BMP:   Recent Labs  Lab 03/21/18  0456   GLU 77   *   K 4.2      CO2 20*   BUN 64*   CREATININE 2.4*   CALCIUM 8.6*   MG 1.7       Significant Imaging: I have reviewed all pertinent imaging results/findings within the past 24 hours.

## 2018-03-21 NOTE — CONSULTS
[facility]  Cardiology  Consult Note    Patient Name: Ewelina Arnold  MRN: [id number]  Admission Date: 3/19/2018  Hospital Length of Stay: 0 days  Code Status: Full Code   Attending Provider: Yves Feliciano MD   Consulting Provider: Risa Carmichael MD  Primary Care Physician: Kalie Walton MD  Principal Problem:Malignant hypertension    Patient information was obtained from patient and past medical records.     Inpatient consult to Cardiology  Consult performed by: RISA CARMICHAEL  Consult ordered by: RUBI TURNER        Subjective:     Chief Complaint:  Severe hypertension.     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 She developed lupus in 1994. Lately accelerated hypertension and rise in BUN/crea. On 1/18/2018 she had a Renal Duplex that did not reveal any increased velocity in the right renal artery but possibly dampened velocities more distally. She had a repeat Renal Duplex today that reveled a similar peak velocity in the right renal artery of 0.64 m/s.     Past Medical History:   Diagnosis Date    Acute on chronic diastolic congestive heart failure 11/17/2017    Allergy     Anatomical narrow angle glaucoma     Anemia     States diagnosed about a month ago    Aortic atherosclerosis     Arthritis     Cataract     CHF (congestive heart failure)     Chronic kidney disease     Chronic sciatica of left side     Right lower back pain due to sciatica    Coronary artery disease     Depression     Dry eyes     GERD (gastroesophageal reflux disease)     History of colon cancer     Hyperaldosteronism     Hyperlipidemia     Hypertension     Hypothyroidism     Left ventricular diastolic dysfunction with preserved systolic function     Lupus (systemic lupus erythematosus) 8/17/2012    Malnutrition 5/16/2017    Osteoarthritis of cervical spine 8/17/2012    Osteopenia     PAD (peripheral artery disease)      FRAN (renal artery stenosis)        Past Surgical History:   Procedure Laterality Date    CARDIAC CATHETERIZATION  07/27/2011    x1    CHOLECYSTECTOMY  1999    COLON SURGERY  2000 & 2003    partial removal    COLONOSCOPY N/A 4/14/2016    Procedure: COLONOSCOPY;  Surgeon: Jose Steen MD;  [facility]  Service: Endoscopy;  Laterality: N/A;  prep 2 days prior light meals only/clear liquid day before  and 4 dulcolax tabs (5 mgs) at noon    COLONOSCOPY N/A 9/14/2017    Procedure: COLONOSCOPY;  Surgeon: Jose Steen MD;  Location: [facility] ([address] [place] [address]);  Service: Endoscopy;  Laterality: N/A;    EYE SURGERY      HAND SURGERY Bilateral 1996 & 1997    due to carpal tunnel     HERNIA REPAIR      HYSTERECTOMY  1983    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    Procardia [nifedipine] Nausea Only and Edema     Feet swelling and nausea, now reports that this has happened in the past and required lasix    Arthrotec 50 [diclofenac-misoprostol]      Unknown    Bentyl [dicyclomine]      Not effective    Doxycycline Nausea Only    Imdur [isosorbide mononitrate] Nausea Only    Lomotil [diphenoxylate-atropine] Nausea And Vomiting     Stomach cramps with vomitting    Lozol [indapamide] Rash    Norvasc [amlodipine]      Not tolerated    Tramadol Nausea Only       No current facility-administered medications on file prior to encounter.      Current Outpatient Prescriptions on File Prior to Encounter   Medication Sig     ammonium lactate 12 % Crea Apply 1 application topically 2 (two) times daily as needed.    aspirin (ECOTRIN) 81 MG EC tablet Take 1 tablet (81 mg total) by mouth once daily. (Patient taking differently: Take 81 mg by mouth. )    azelastine (ASTELIN) 137 mcg (0.1 %) nasal spray 1 spray (137 mcg total) by Nasal route 2 (two) times daily. (Patient taking differently: 1 spray by Nasal route as needed. )    citalopram (CELEXA) 20 MG tablet Take 1.5 tablets (30 mg total) by mouth once daily.    cloNIDine (CATAPRES) 0.2 MG tablet Take 1 tablet (0.2 mg total) by mouth 2 (two) times daily. (Patient taking differently: Take 0.1 mg by mouth once. )    conjugated estrogens (PREMARIN) vaginal cream Use 1g nightly for two weeks, then 1g twice per week. (Patient taking differently: as needed. Use 1g nightly for two weeks, then 1g twice per week.)    eplerenone (INSPRA) 50 MG Tab Take 1 tablet (50 mg total) by mouth once daily.    hydrALAZINE (APRESOLINE) 50 MG tablet Take 2 tablets (100 mg total) by mouth every 8 (eight) hours.    hydroxychloroquine (PLAQUENIL) 200 mg tablet TAKE 1 TABLET TWICE DAILY    ipratropium (ATROVENT) 0.03 % nasal spray 2 sprays by Nasal route 2 (two) times daily as needed for Rhinitis.    irbesartan (AVAPRO) 300 MG tablet Take 1 tablet (300 mg total) by mouth once daily. please take at 9AM (Patient taking differently: Take 300 mg by mouth once daily. Takes 2 pills daily)    labetalol (NORMODYNE) 200 MG tablet 400mg in AM, 600mg in PM    latanoprost 0.005 % ophthalmic solution INSTILL 1 DROP INTO BOTH EYES EVERY DAY    levothyroxine (SYNTHROID) 75 MCG tablet TAKE 1 TABLET (75 MCG TOTAL) BY MOUTH BEFORE BREAKFAST.    meclizine (ANTIVERT) 12.5 mg tablet Take 1 tablet (12.5 mg total) by mouth 3 (three) times daily as needed.    nitroGLYCERIN (NITROSTAT) 0.4 MG SL tablet Place 1 tablet (0.4 mg total) under the tongue every 5 (five) minutes as needed for Chest pain.    omeprazole (PRILOSEC) 40  MG capsule Take 1 capsule (40 mg total) by mouth once daily. (Patient taking differently: Take 40 mg by mouth as needed. )    rosuvastatin (CRESTOR) 40 MG Tab TAKE 1 TABLET EVERY EVENING    triamcinolone acetonide 0.1% (KENALOG) 0.1 % paste PLACE ONTO TEETH TWICE DAILY (Patient taking differently: as needed. PLACE ONTO TEETH TWICE DAILY)    VITAMIN D2 50,000 unit capsule TAKE 1 CAPSULE (50,000 UNITS TOTAL) EVERY 30 DAYS    cholestyramine (QUESTRAN) 4 gram packet Take 1 packet (4 g total) by mouth 3 (three) times daily with meals.    cyproheptadine (PERIACTIN) 4 mg tablet Take 1 tablet (4 mg total) by mouth every evening.    multivitamin capsule Take 1 capsule by mouth once daily.    TENS units Sravanthi 1 application by Misc.(Non-Drug; Combo Route) route daily as needed (pain).    verapamil (CALAN-SR) 240 MG CR tablet Take 1 tablet (240 mg total) by mouth every evening.     Family History     Problem Relation (Age of Onset)    Diabetes Maternal Grandmother, Son, Maternal Aunt    Glaucoma Maternal Grandmother    Heart attack Mother    Heart disease Father    Hypertension Mother, Father, Sister, Brother    Kidney disease Brother    Kidney failure Brother    No Known Problems Daughter, Maternal Grandfather, Paternal Grandmother, Paternal Grandfather        Social History Main Topics    Smoking status: Former Smoker     Packs/day: 1.50     Years: 30.00     Types: Cigarettes     Start date: 1955     Quit date: 3/13/1985    Smokeless tobacco: Never Used    Alcohol use No    Drug use: No    Sexual activity: Not Currently     Partners: Male     Birth control/ protection: Abstinence     Review of Systems   Constitution: Positive for weakness and malaise/fatigue.   Cardiovascular: Negative for chest pain and dyspnea on exertion.   Respiratory: Negative for shortness of breath.      Objective:     Vital Signs (Most Recent):  Temp: 98.6 °F (37 °C) (03/20/18 1956)  Pulse: 60 (03/20/18 1956)  Resp: 16 (03/20/18 1956)  BP:  (!) 186/82 (03/20/18 1956)  SpO2: 98 % (03/20/18 1956) Vital Signs (24h Range):  Temp:  [97.8 °F (36.6 °C)-98.7 °F (37.1 °C)] 98.6 °F (37 °C)  Pulse:  [36-68] 60  Resp:  [13-19] 16  SpO2:  [95 %-100 %] 98 %  BP: (109-213)/(53-94) 186/82     Weight: 46.3 kg (102 lb)  Body mass index is 18.66 kg/m².    SpO2: 98 %  O2 Device (Oxygen Therapy): room air      Intake/Output Summary (Last 24 hours) at 03/20/18 2013  Last data filed at 03/20/18 1326   Gross per 24 hour   Intake              340 ml   Output              275 ml   Net               65 ml       Lines/Drains/Airways     Peripheral Intravenous Line                 Peripheral IV - Single Lumen 01/25/18 2300 Left Forearm 53 days         Peripheral IV - Single Lumen 03/19/18 1852 Left Forearm 1 day                Physical Exam   Constitutional: She appears well-developed and well-nourished.   Cardiovascular: Normal rate, regular rhythm, S1 normal and S2 normal.    Murmur heard.   Harsh midsystolic murmur is present with a grade of 3/6  at the upper right sternal border  Pulses:       Radial pulses are 2+ on the right side, and 2+ on the left side.        Femoral pulses are 2+ on the right side, and 2+ on the left side.  Pulmonary/Chest: Effort normal and breath sounds normal.   Abdominal: Soft. Normal appearance. She exhibits no abdominal bruit.   Skin: Skin is warm and dry. No rash noted.     Current Medications:     aspirin  81 mg Oral Daily    citalopram  30 mg Oral Daily    famotidine  20 mg Oral Daily    heparin (porcine)  5,000 Units Subcutaneous Q8H    hydrALAZINE  10 mg Intravenous Once    hydrALAZINE  100 mg Oral Q8H    hydroxychloroquine  200 mg Oral BID    latanoprost  1 drop Both Eyes Daily    levothyroxine  75 mcg Oral Before breakfast    rosuvastatin  10 mg Oral QHS     Current Laboratory Results:    Recent Results (from the past 24 hour(s))   Urinalysis - Clean Catch    Collection Time: 03/19/18 10:11 PM   Result Value Ref Range    Specimen  UA Urine, Clean Catch     Color, UA Yellow Yellow, Straw, Ashley    Appearance, UA Clear Clear    pH, UA 6.0 5.0 - 8.0    Specific Gravity, UA 1.020 1.005 - 1.030    Protein, UA 2+ (A) Negative    Glucose, UA Negative Negative    Ketones, UA Negative Negative    Bilirubin (UA) Negative Negative    Occult Blood UA Negative Negative    Nitrite, UA Negative Negative    Urobilinogen, UA Negative <2.0 EU/dL    Leukocytes, UA Negative Negative   Urinalysis Microscopic    Collection Time: 03/19/18 10:11 PM   Result Value Ref Range    RBC, UA 0 0 - 4 /hpf    WBC, UA 0 0 - 5 /hpf    Bacteria, UA Rare None-Occ /hpf    Non-Squam Epith 3 (A) <1/hpf /hpf    Hyaline Casts, UA 2 (A) 0-1/lpf /lpf    Microscopic Comment SEE COMMENT    Comprehensive Metabolic Panel (CMP)    Collection Time: 03/20/18  5:26 AM   Result Value Ref Range    Sodium 134 (L) 136 - 145 mmol/L    Potassium 4.0 3.5 - 5.1 mmol/L    Chloride 104 95 - 110 mmol/L    CO2 22 (L) 23 - 29 mmol/L    Glucose 79 70 - 110 mg/dL    BUN, Bld 66 (H) 8 - 23 mg/dL    Creatinine 2.9 (H) 0.5 - 1.4 mg/dL    Calcium 8.7 8.7 - 10.5 mg/dL    Total Protein 6.0 6.0 - 8.4 g/dL    Albumin 2.6 (L) 3.5 - 5.2 g/dL    Total Bilirubin 0.2 0.1 - 1.0 mg/dL    Alkaline Phosphatase 77 55 - 135 U/L    AST 30 10 - 40 U/L    ALT 15 10 - 44 U/L    Anion Gap 8 8 - 16 mmol/L    eGFR if African American 18 (A) >60 mL/min/1.73 m^2    eGFR if non African American 16 (A) >60 mL/min/1.73 m^2   Magnesium    Collection Time: 03/20/18  5:26 AM   Result Value Ref Range    Magnesium 1.6 1.6 - 2.6 mg/dL   Phosphorus    Collection Time: 03/20/18  5:26 AM   Result Value Ref Range    Phosphorus 4.2 2.7 - 4.5 mg/dL   CBC with Automated Differential    Collection Time: 03/20/18  5:26 AM   Result Value Ref Range    WBC 3.51 (L) 3.90 - 12.70 K/uL    RBC 3.38 (L) 4.00 - 5.40 M/uL    Hemoglobin 8.6 (L) 12.0 - 16.0 g/dL    Hematocrit 26.7 (L) 37.0 - 48.5 %    MCV 79 (L) 82 - 98 fL    MCH 25.4 (L) 27.0 - 31.0 pg    MCHC 32.2  32.0 - 36.0 g/dL    RDW 13.8 11.5 - 14.5 %    Platelets 194 150 - 350 K/uL    MPV 9.6 9.2 - 12.9 fL    Gran # (ANC) 1.7 (L) 1.8 - 7.7 K/uL    Lymph # 1.0 1.0 - 4.8 K/uL    Mono # 0.8 0.3 - 1.0 K/uL    Eos # 0.1 0.0 - 0.5 K/uL    Baso # 0.02 0.00 - 0.20 K/uL    Gran% 49.2 38.0 - 73.0 %    Lymph% 27.1 18.0 - 48.0 %    Mono% 21.4 (H) 4.0 - 15.0 %    Eosinophil% 1.4 0.0 - 8.0 %    Basophil% 0.6 0.0 - 1.9 %    Differential Method Automated    Lipid panel    Collection Time: 03/20/18  5:26 AM   Result Value Ref Range    Cholesterol 93 (L) 120 - 199 mg/dL    Triglycerides 59 30 - 150 mg/dL    HDL 33 (L) 40 - 75 mg/dL    LDL Cholesterol 48.2 (L) 63.0 - 159.0 mg/dL    HDL/Chol Ratio 35.5 20.0 - 50.0 %    Total Cholesterol/HDL Ratio 2.8 2.0 - 5.0    Non-HDL Cholesterol 60 mg/dL   Uric acid    Collection Time: 03/20/18  5:26 AM   Result Value Ref Range    Uric Acid 10.7 (H) 2.4 - 5.7 mg/dL   CK    Collection Time: 03/20/18  5:26 AM   Result Value Ref Range     20 - 180 U/L     Current Imaging Results:    Imaging Results    None       Time of Procedure: 01/18/18 17:50:00  Accession # [id number].    HISTORY: Hypertension    TECHNIQUE: Doppler ultrasound of the kidneys was performed.    COMPARISON: CT abdomen and pelvis with contrast on 3/8/2017    FINDINGS:      RIGHT: The right kidney measures 11.8 cm and demonstrates mild increased cortical echogenicity with no evidence of cortical thinning.  No nephrolithiasis or solid renal masses.  Initial images demonstrate mild right-sided hydronephrosis that resolves after urinary bladder emptying.  Perfusion to the right kidney is mildly decreased.  Resistive indices are elevated as follow: Upper segmental artery 0.76, middle segmental artery 0.70, and lower segmental artery 0.80. The main renal artery is not definitely visualized at its origin.   The maximum velocity of the right renal artery measures 64 cm/s with a maximum renal artery to aortic ratio of 0.6. The segmental  branches of the right renal artery demonstrate possible tardus parvus waveform.     LEFT: Patient status post left nephrectomy.   Impression        1. The origin of the right renal artery is not definitely visualized and there are questionable tardus parvus waveforms within the segmental branches of the right renal artery, which can be seen in setting of renal artery stenosis.  CTA abdomen pelvis is recommended to evaluate the renal artery ostium.    2. Sequela of medical renal disease.    3. Patient is status post left nephrectomy.    EPIC notification activated.      ______________________________________     Electronically signed by resident: HERSON KHOURY MD  Date: 01/18/18  Time: 18:49            As the supervising and teaching physician, I personally reviewed the images and resident's interpretation and I agree with the findings.      ______________________________________     Electronically signed by resident: HERSON KHOURY MD  Date: 01/18/18  Time: 19:06            As the supervising and teaching physician, I personally reviewed the images and resident's interpretation and I agree with the findings.          Electronically signed by: TWAN CLARK MD  Date: 01/18/18  Time: 19:13      EXAMINATION:  US RENAL ARTERY STENOSIS HYPERTEN (XPD)    CLINICAL HISTORY:  MARYAM/HTN;    TECHNIQUE:  Routine sonographic imaging performed.    COMPARISON:  January 18, 2018    FINDINGS:  Left kidney is not visualized consistent with history of removal.    Right kidney measures 11.3 cm.  Echotexture is similarly increased.  No significant cortical thinning.  No hydronephrosis.  No concerning mass lesion demonstrated.    The aorta peak systolic velocity is 62.1 centimeters/second.  Diffuse atherosclerotic plaquing noted.    Main renal vein patent.    Peak systolic velocity of the right renal artery measures 62 centimeters/second proximally.    Renal artery to aortic ratio --1.0    Resistive indices are 0.8-1.0.  Higher  documented resistive indices may be technically exaggerated.   Impression       No significant increased velocity within the visualize the main renal artery.  There is aortic atherosclerotic calcification present.    Elevated resistive indices with echogenic right kidney consistent with medical renal disease.      Electronically signed by: Keith Peck MD  Date: 03/20/2018  Time: 11:59         Assessment and Plan:     Active Diagnoses:    Diagnosis Date Noted POA    PRINCIPAL PROBLEM:  Malignant hypertension [I10] 03/19/2018 Yes    Bradycardia [R00.1] 03/20/2018 Unknown    Chronic kidney disease, stage IV (severe) [N18.4] 01/16/2018 Yes    Mixed hyperlipidemia [E78.2]  Yes    Postsurgical hypothyroidism [E89.0] 05/13/2015 Yes      Problems Resolved During this Admission:    Diagnosis Date Noted Date Resolved POA     Assessment and Plan:    1. Hypertension   Severe and accelerated.   1/18/2018: U/S Renal: Right Renal Artery: Poorly visualized ostium. Peak velocity 0.64 m/s.    3/20/2018: U/S Renal:Right Renal Artery: Peak velocity 0.62 m/s.   That the above two studies do not reveal any increased velocity in the right renal artery makes significant renal artery stenosis as a cause for her severe hypertension and rise in BUN/crea unlikely.   I would not favor angiography.       VTE Risk Mitigation         Ordered     heparin (porcine) injection 5,000 Units  Every 8 hours     Route:  Subcutaneous        03/20/18 0052     Place sequential compression device  Until discontinued      03/19/18 2056          Thank you for your consult.    I will follow-up with patient. Please contact us if you have any additional questions.    Risa Lea MD  Cardiology   [facility]

## 2018-03-21 NOTE — PROGRESS NOTES
[facility] Hospital Medicine  Progress Note    Patient Name: Ewelina Arnold  MRN: [id number]  Patient Class: IP- Inpatient   Admission Date: 3/19/2018  Length of Stay: 0 days  Attending Physician: Yves Feliciano MD  Primary Care Provider: Kalie Walton MD        Subjective:     Principal Problem:Malignant hypertension    HPI:  This is a 72 y.o. Female, with history of HTN and CKD, who presents with complaint of fatigue that has been constant for approximately six days. Patient reports that six days ago she began taking Inspra to treat her HTN and stopped taking Torcemide. She reports that since this medication change she has been feeling fatigue accompanied by weakness, decreased appetite, nausea, and vomiting. These symptoms are similar to an episode three months ago for which she was admitted to [facility] for eleven days. During this hospital admission, patient was treated and symptoms had resolved. She was discharged on 1/26/2018. She reports during this admission she was diagnosed with hyperaldosteronism. Patient denies diarrhea, constipation, shortness of breath, or chest pain. Patient also denies use of alcohol, tobacco, or illicit drugs. She reports that her primary care provider is Dr. Snyder and her nephrologist is Dr. Diallo.     Hospital Course:  No notes on file    Interval History:   Pt was restarted on eplerenone - so far has not has any adverse effect. bp remains elevated    Pt had renal US - no evidence of FRAN    Renal function better    Review of Systems   Constitutional: Negative for fatigue.   Respiratory: Negative for shortness of breath.    Cardiovascular: Negative for chest pain.     Objective:     Vital Signs (Most Recent):  Temp: 97.7 °F (36.5 °C) (03/21/18 0728)  Pulse: 74 (03/21/18 0728)  Resp: 16 (03/21/18 0728)  BP: (!) 193/84 (03/21/18 0728)  SpO2: 97 % (03/21/18 0728) Vital Signs (24h Range):  Temp:  [97.7 °F (36.5 °C)-98.9 °F (37.2 °C)] 97.7 °F (36.5  °C)  Pulse:  [57-78] 74  Resp:  [16-20] 16  SpO2:  [97 %-99 %] 97 %  BP: (165-204)/(70-86) 193/84     Weight: 46.3 kg (102 lb)  Body mass index is 18.66 kg/m².    Intake/Output Summary (Last 24 hours) at 03/21/18 1000  Last data filed at 03/20/18 1855   Gross per 24 hour   Intake           553.35 ml   Output              475 ml   Net            78.35 ml      Physical Exam    Significant Labs:   BMP:   Recent Labs  Lab 03/21/18  0456   GLU 77   *   K 4.2      CO2 20*   BUN 64*   CREATININE 2.4*   CALCIUM 8.6*   MG 1.7       Significant Imaging: I have reviewed all pertinent imaging results/findings within the past 24 hours.    Assessment/Plan:      * Malignant hypertension    BP remains elevated.          Hyperaldosteronism    eplerenone restarted        Chronic diastolic congestive heart failure      euvolemic  Monitor weights        Bradycardia    Patient was given 600 mg of Labetalol today and had her HR drop to 45.    Hold parameters assigned.  Nephrology assistance with titrating BP meds with current renal function          MARYAM on CKD 4    Cr improving          Chronic kidney disease, stage IV (severe)    Creatinine 2.8, baseline between 2 and 3 for the last 5 months.              Solitary kidney, acquired              Hypertensive kidney disease with chronic kidney disease stage III    Monitor renal function  Renal dose meds  Avoid nephrotoxins          Mixed hyperlipidemia      Continue Crestor          Postsurgical hypothyroidism    Continue levothyroxine            VTE Risk Mitigation         Ordered     heparin (porcine) injection 5,000 Units  Every 8 hours     Route:  Subcutaneous        03/20/18 0052     Place sequential compression device  Until discontinued      03/19/18 2056              Yves Feliciano MD  Department of Hospital Medicine   [facility]

## 2018-03-21 NOTE — PROGRESS NOTES
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 She developed lupus in 1994. Lately accelerated hypertension and rise in BUN/crea. On 1/18/2018 she had a Renal Duplex that did not reveal any increased velocity in the right renal artery but possibly dampened velocities more distally. She had a repeat Renal Duplex today that reveled a similar peak velocity in the right renal artery of 0.64 m/s.     Hospital Course:    Adjustments of antihypertensives.    Interval History:     No CP or SOB.    Review of Systems   Cardiovascular: Negative for chest pain and dyspnea on exertion.   Respiratory: Negative for shortness of breath.      Objective:     Vital Signs (Most Recent):  Temp: 97.6 °F (36.4 °C) (03/21/18 1627)  Pulse: 74 (03/21/18 1800)  Resp: 16 (03/21/18 1627)  BP: (!) 192/74 (03/21/18 1627)  SpO2: 97 % (03/21/18 1627) Vital Signs (24h Range):  Temp:  [97.3 °F (36.3 °C)-98.9 °F (37.2 °C)] 97.6 °F (36.4 °C)  Pulse:  [58-78] 74  Resp:  [16-20] 16  SpO2:  [97 %-99 %] 97 %  BP: (165-224)/(70-97) 192/74     Weight: 46.3 kg (102 lb)  Body mass index is 18.66 kg/m².    SpO2: 97 %  O2 Device (Oxygen Therapy): room air      Intake/Output Summary (Last 24 hours) at 03/21/18 1858  Last data filed at 03/21/18 1000   Gross per 24 hour   Intake                0 ml   Output              350 ml   Net             -350 ml       Lines/Drains/Airways     Peripheral Intravenous Line                 Peripheral IV - Single Lumen 01/25/18 2300 Left Forearm 54 days         Peripheral IV - Single Lumen 03/19/18 1852  Left Forearm 2 days                Physical Exam   Constitutional: She appears well-developed and well-nourished.   Cardiovascular: Normal rate and regular rhythm.    Pulmonary/Chest: Effort normal and breath sounds normal.     Current Medications:     aspirin  81 mg Oral Daily    citalopram  30 mg Oral Daily    eplerenone  50 mg Oral BID    famotidine  20 mg Oral Daily    heparin (porcine)  5,000 Units Subcutaneous Q8H    hydroxychloroquine  200 mg Oral BID    latanoprost  1 drop Both Eyes Daily    levothyroxine  75 mcg Oral Before breakfast    NIFEdipine  30 mg Oral Daily    rosuvastatin  10 mg Oral QHS     Current Laboratory Results:    Recent Results (from the past 24 hour(s))   Comprehensive Metabolic Panel (CMP)    Collection Time: 03/21/18  4:56 AM   Result Value Ref Range    Sodium 134 (L) 136 - 145 mmol/L    Potassium 4.2 3.5 - 5.1 mmol/L    Chloride 106 95 - 110 mmol/L    CO2 20 (L) 23 - 29 mmol/L    Glucose 77 70 - 110 mg/dL    BUN, Bld 64 (H) 8 - 23 mg/dL    Creatinine 2.4 (H) 0.5 - 1.4 mg/dL    Calcium 8.6 (L) 8.7 - 10.5 mg/dL    Total Protein 6.1 6.0 - 8.4 g/dL    Albumin 2.6 (L) 3.5 - 5.2 g/dL    Total Bilirubin 0.2 0.1 - 1.0 mg/dL    Alkaline Phosphatase 80 55 - 135 U/L    AST 31 10 - 40 U/L    ALT 17 10 - 44 U/L    Anion Gap 8 8 - 16 mmol/L    eGFR if African American 23 (A) >60 mL/min/1.73 m^2    eGFR if non African American 20 (A) >60 mL/min/1.73 m^2   Magnesium    Collection Time: 03/21/18  4:56 AM   Result Value Ref Range    Magnesium 1.7 1.6 - 2.6 mg/dL   Phosphorus    Collection Time: 03/21/18  4:56 AM   Result Value Ref Range    Phosphorus 3.7 2.7 - 4.5 mg/dL   CBC with Automated Differential    Collection Time: 03/21/18  4:56 AM   Result Value Ref Range    WBC 4.60 3.90 - 12.70 K/uL    RBC 3.36 (L) 4.00 - 5.40 M/uL    Hemoglobin 8.6 (L) 12.0 - 16.0 g/dL    Hematocrit 26.9 (L) 37.0 - 48.5 %    MCV 80 (L) 82 - 98 fL    MCH 25.6 (L) 27.0 - 31.0 pg    MCHC 32.0 32.0 - 36.0 g/dL    RDW  13.8 11.5 - 14.5 %    Platelets 189 150 - 350 K/uL    MPV 9.7 9.2 - 12.9 fL    Gran # (ANC) 2.6 1.8 - 7.7 K/uL    Lymph # 1.3 1.0 - 4.8 K/uL    Mono # 0.7 0.3 - 1.0 K/uL    Eos # 0.0 0.0 - 0.5 K/uL    Baso # 0.02 0.00 - 0.20 K/uL    Gran% 55.5 38.0 - 73.0 %    Lymph% 28.0 18.0 - 48.0 %    Mono% 15.0 4.0 - 15.0 %    Eosinophil% 0.9 0.0 - 8.0 %    Basophil% 0.4 0.0 - 1.9 %    Differential Method Automated      Current Imaging Results:    Imaging Results    None           Assessment and Plan:     Problem List:    Active Diagnoses:    Diagnosis Date Noted POA    PRINCIPAL PROBLEM:  Malignant hypertension [I10] 03/19/2018 Yes    Chronic diastolic congestive heart failure [I50.32] 03/21/2018 Yes    Hyperaldosteronism [E26.9] 03/21/2018 Yes    Bradycardia [R00.1] 03/20/2018 Yes    MARYAM on CKD 4 [N17.9] 01/21/2018 Yes    Chronic kidney disease, stage IV (severe) [N18.4] 01/16/2018 Yes    Mixed hyperlipidemia [E78.2]  Yes    Postsurgical hypothyroidism [E89.0] 05/13/2015 Yes      Problems Resolved During this Admission:    Diagnosis Date Noted Date Resolved POA    SLE (systemic lupus erythematosus) [M32.9] 11/27/2012 12/19/2012 Yes     Assessment and Plan:     1. Hypertension              Severe and accelerated.              1/18/2018: U/S Renal: Right Renal Artery: Poorly visualized ostium. Peak velocity 0.64 m/s.               3/20/2018: U/S Renal:Right Renal Artery: Peak velocity 0.62 m/s.              That the above two studies do not reveal any increased velocity in the right renal artery makes significant renal artery stenosis as a cause for her severe hypertension and rise in BUN/crea unlikely.              I would not favor angiography.   Discussed further with patient and family.    No need for me to follow,    Thanks,      VTE Risk Mitigation         Ordered     heparin (porcine) injection 5,000 Units  Every 8 hours     Route:  Subcutaneous        03/20/18 0052     Place sequential compression device  Until  discontinued      03/19/18 2056          Risa Lea MD  Cardiology  [facility]

## 2018-03-21 NOTE — PROGRESS NOTES
"Nephrology  Progress Note    Admit Date: 3/19/2018   LOS: 0 days     SUBJECTIVE:     Follow-up For:  Malignant hypertension    Interval History:     Uneventful night but with elevated BP this am.  No cp/sob.  Renal fxn improving.  Extensive discussion with pt/attending/hospitalist.  See below.     Review of Systems:  Constitutional: No fever or chills  Respiratory: No cough or shortness of breath  Cardiovascular: No chest pain or palpitations  Gastrointestinal: No nausea or vomiting  Neurological: No confusion or weakness    OBJECTIVE:     Vital Signs Range (Last 24H):  BP (!) 205/82 (BP Location: Left arm, Patient Position: Sitting)   Pulse 73   Temp 97.7 °F (36.5 °C) (Oral)   Resp 16   Ht 5' 2" (1.575 m)   Wt 46.3 kg (102 lb)   LMP  (LMP Unknown)   SpO2 97%   Breastfeeding? No   BMI 18.66 kg/m²     Temp:  [97.7 °F (36.5 °C)-98.9 °F (37.2 °C)]   Pulse:  [57-78]   Resp:  [16-20]   BP: (165-205)/(70-86)   SpO2:  [97 %-99 %]     I & O (Last 24H):  Intake/Output Summary (Last 24 hours) at 03/21/18 1046  Last data filed at 03/21/18 1000   Gross per 24 hour   Intake           553.35 ml   Output              825 ml   Net          -271.65 ml       Physical Exam:  General appearance: Malnourished  Eyes:  Conjunctivae/corneas clear. PERRL.  Lungs: Normal respiratory effort,   clear to auscultation bilaterally   Heart: Regular rate and rhythm, S1, S2 normal, no murmur, rub or ruma.  Abdomen: Soft, non-tender non-distended; bowel sounds normal; no masses,  no organomegaly  Extremities: No cyanosis or clubbing. No edema.    Skin: Skin color, texture, turgor normal. No rashes or lesions  Neurologic: Normal strength and tone. No focal numbness or weakness     Laboratory Data:    Recent Labs  Lab 03/21/18  0456   WBC 4.60   RBC 3.36*   HGB 8.6*   HCT 26.9*      MCV 80*   MCH 25.6*   MCHC 32.0       BMP:   Recent Labs  Lab 03/21/18  0456   GLU 77   *   K 4.2      CO2 20*   BUN 64*   CREATININE 2.4* "   CALCIUM 8.6*   MG 1.7     Lab Results   Component Value Date    CALCIUM 8.6 (L) 03/21/2018    PHOS 3.7 03/21/2018       Lab Results   Component Value Date    .0 (H) 11/21/2016    CALCIUM 8.6 (L) 03/21/2018    PHOS 3.7 03/21/2018       Lab Results   Component Value Date    URICACID 10.7 (H) 03/20/2018       Medications:  Medication list was reviewed and changes noted under Assessment/Plan.    Diagnostic Results:    US Renal Artery Stenosis Hyperten (xpd)   Final Result      No significant increased velocity within the visualize the main renal artery.  There is aortic atherosclerotic calcification present.      Elevated resistive indices with echogenic right kidney consistent with medical renal disease.         Electronically signed by: Keith Peck MD   Date:    03/20/2018   Time:    11:59          ASSESSMENT/PLAN:     1. Resolving nonoliguric MARYAM on CKD IV (Baseline variable 1.8) likely due to multiple factors including dehydration, fluctuating BP, uni-nephric while on ARB (N17.9, N18.4, Z90.5, I70.1):  [place] [facility] [place]  Recently started on inspra for hyperaldo and has been feeling poorly since with variable BP's at home.  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 Lea who feels no significant stenosis and angio not warranted at this time. In the absence of an absolute study such as CTA/ Angiogram we cannot say with 100% certainty, however the risk of these test with MARYAM is high and current evidence does not support this.  For now recommend holding ARB. Hold Verapimil and Labetalol. Resumed Eplerenone at 50 mg bid.  Use prn dosing of hydralazine or nitrates. Gave gentle IVF's for dehydration.  Follow trends.  Renally dose meds, avoid nephrotoxins, and monitor I/O's closely.  2. Hyperaldosteronism (E26.9): as above    3. Acc HTN (I12.9):  Wide fluctuations in BP.  Use  hydralazine with hold parameters.  start low dose procardia and monitor trends.  She needs to run a little on high side. Slow and easy titrations.    4. Bradycardia (R00.1):  Pt given labetolol and verapamil in ED and developed evelyn (HR 30-40s).  Keep on tele.  Hold agents and HR improving.  May consider coreg for antiprotenuric effects later.     5. Hyperuricemia (E79.0):  Recheck as outpt once euvolemic.  May need allopurinol/uloric.   6. SLE (M32.9):  Continue plaquenil.  Defer. Checking anti-histone just due to symptoms and she has been given hydralazine.  7. Weight loss (R63.4):  Needs w/up by PCP.        See above  DC once BP stable.

## 2018-03-22 ENCOUNTER — TELEPHONE (OUTPATIENT)
Dept: FAMILY MEDICINE | Facility: CLINIC | Age: 73
End: 2018-03-22

## 2018-03-22 LAB
ALBUMIN SERPL BCP-MCNC: 2.7 G/DL
ALP SERPL-CCNC: 83 U/L
ALT SERPL W/O P-5'-P-CCNC: 27 U/L
ANION GAP SERPL CALC-SCNC: 7 MMOL/L
AST SERPL-CCNC: 42 U/L
BASOPHILS # BLD AUTO: 0.03 K/UL
BASOPHILS NFR BLD: 0.7 %
BILIRUB SERPL-MCNC: 0.2 MG/DL
BUN SERPL-MCNC: 60 MG/DL
CALCIUM SERPL-MCNC: 9 MG/DL
CHLORIDE SERPL-SCNC: 107 MMOL/L
CO2 SERPL-SCNC: 20 MMOL/L
CREAT SERPL-MCNC: 2 MG/DL
DIFFERENTIAL METHOD: ABNORMAL
EOSINOPHIL # BLD AUTO: 0 K/UL
EOSINOPHIL NFR BLD: 0 %
ERYTHROCYTE [DISTWIDTH] IN BLOOD BY AUTOMATED COUNT: 14 %
EST. GFR  (AFRICAN AMERICAN): 28 ML/MIN/1.73 M^2
EST. GFR  (NON AFRICAN AMERICAN): 24 ML/MIN/1.73 M^2
GLUCOSE SERPL-MCNC: 81 MG/DL
HCT VFR BLD AUTO: 26.8 %
HGB BLD-MCNC: 8.7 G/DL
LYMPHOCYTES # BLD AUTO: 1.4 K/UL
LYMPHOCYTES NFR BLD: 31.2 %
MAGNESIUM SERPL-MCNC: 1.7 MG/DL
MCH RBC QN AUTO: 25.7 PG
MCHC RBC AUTO-ENTMCNC: 32.5 G/DL
MCV RBC AUTO: 79 FL
MONOCYTES # BLD AUTO: 0.7 K/UL
MONOCYTES NFR BLD: 14.7 %
NEUTROPHILS # BLD AUTO: 2.4 K/UL
NEUTROPHILS NFR BLD: 53.2 %
PHOSPHATE SERPL-MCNC: 3.4 MG/DL
PLATELET # BLD AUTO: 213 K/UL
PMV BLD AUTO: 9.6 FL
POTASSIUM SERPL-SCNC: 4.5 MMOL/L
PROT SERPL-MCNC: 6.4 G/DL
RBC # BLD AUTO: 3.38 M/UL
SODIUM SERPL-SCNC: 134 MMOL/L
WBC # BLD AUTO: 4.49 K/UL

## 2018-03-22 PROCEDURE — 25000003 PHARM REV CODE 250: Performed by: NURSE PRACTITIONER

## 2018-03-22 PROCEDURE — 63600175 PHARM REV CODE 636 W HCPCS: Performed by: NURSE PRACTITIONER

## 2018-03-22 PROCEDURE — 25000003 PHARM REV CODE 250: Performed by: INTERNAL MEDICINE

## 2018-03-22 PROCEDURE — 11000001 HC ACUTE MED/SURG PRIVATE ROOM

## 2018-03-22 PROCEDURE — 80053 COMPREHEN METABOLIC PANEL: CPT

## 2018-03-22 PROCEDURE — 97161 PT EVAL LOW COMPLEX 20 MIN: CPT

## 2018-03-22 PROCEDURE — 83735 ASSAY OF MAGNESIUM: CPT

## 2018-03-22 PROCEDURE — 36415 COLL VENOUS BLD VENIPUNCTURE: CPT

## 2018-03-22 PROCEDURE — 84100 ASSAY OF PHOSPHORUS: CPT

## 2018-03-22 PROCEDURE — 85025 COMPLETE CBC W/AUTO DIFF WBC: CPT

## 2018-03-22 PROCEDURE — 99232 SBSQ HOSP IP/OBS MODERATE 35: CPT | Mod: ,,, | Performed by: INTERNAL MEDICINE

## 2018-03-22 RX ORDER — BUTALBITAL, ACETAMINOPHEN AND CAFFEINE 50; 325; 40 MG/1; MG/1; MG/1
1 TABLET ORAL ONCE
Status: COMPLETED | OUTPATIENT
Start: 2018-03-22 | End: 2018-03-22

## 2018-03-22 RX ADMIN — HEPARIN SODIUM 5000 UNITS: 5000 INJECTION, SOLUTION INTRAVENOUS; SUBCUTANEOUS at 05:03

## 2018-03-22 RX ADMIN — CITALOPRAM HYDROBROMIDE 30 MG: 20 TABLET ORAL at 08:03

## 2018-03-22 RX ADMIN — EPLERENONE 50 MG: 25 TABLET ORAL at 08:03

## 2018-03-22 RX ADMIN — HYDROXYCHLOROQUINE SULFATE 200 MG: 200 TABLET, FILM COATED ORAL at 08:03

## 2018-03-22 RX ADMIN — LEVOTHYROXINE SODIUM 75 MCG: 75 TABLET ORAL at 05:03

## 2018-03-22 RX ADMIN — FAMOTIDINE 20 MG: 20 TABLET, FILM COATED ORAL at 08:03

## 2018-03-22 RX ADMIN — BUTALBITAL, ACETAMINOPHEN AND CAFFEINE 1 TABLET: 50; 325; 40 TABLET ORAL at 11:03

## 2018-03-22 RX ADMIN — HEPARIN SODIUM 5000 UNITS: 5000 INJECTION, SOLUTION INTRAVENOUS; SUBCUTANEOUS at 02:03

## 2018-03-22 RX ADMIN — EPLERENONE 50 MG: 25 TABLET ORAL at 09:03

## 2018-03-22 RX ADMIN — HYDROXYCHLOROQUINE SULFATE 200 MG: 200 TABLET, FILM COATED ORAL at 09:03

## 2018-03-22 RX ADMIN — HYDRALAZINE HYDROCHLORIDE 10 MG: 20 INJECTION INTRAMUSCULAR; INTRAVENOUS at 02:03

## 2018-03-22 RX ADMIN — NIFEDIPINE 30 MG: 30 TABLET, FILM COATED, EXTENDED RELEASE ORAL at 08:03

## 2018-03-22 RX ADMIN — ROSUVASTATIN CALCIUM 10 MG: 10 TABLET, FILM COATED ORAL at 09:03

## 2018-03-22 RX ADMIN — LATANOPROST 1 DROP: 50 SOLUTION OPHTHALMIC at 08:03

## 2018-03-22 RX ADMIN — ASPIRIN 81 MG: 81 TABLET, COATED ORAL at 08:03

## 2018-03-22 NOTE — PROGRESS NOTES
"Nephrology  Progress Note    Admit Date: 3/19/2018   LOS: 1 day     SUBJECTIVE:     Follow-up For:  Malignant hypertension    Interval History:     Pt. Overall doing well this am except "small HA."    Review of Systems:  Constitutional: No fever or chills  Respiratory: No cough or shortness of breath  Cardiovascular: No chest pain or palpitations  Gastrointestinal: No nausea or vomiting  Neurological: No confusion or weakness    OBJECTIVE:     Vital Signs Range (Last 24H):  BP (!) 182/81 (Patient Position: Sitting)   Pulse 77   Temp 98.2 °F (36.8 °C) (Oral)   Resp 18   Ht 5' 2" (1.575 m)   Wt 46.3 kg (102 lb)   LMP  (LMP Unknown)   SpO2 (!) 94%   Breastfeeding? No   BMI 18.66 kg/m²     Temp:  [97.6 °F (36.4 °C)-98.8 °F (37.1 °C)]   Pulse:  [71-85]   Resp:  [16-18]   BP: (142-224)/(65-97)   SpO2:  [92 %-97 %]     I & O (Last 24H):    Intake/Output Summary (Last 24 hours) at 03/22/18 1146  Last data filed at 03/22/18 0400   Gross per 24 hour   Intake                0 ml   Output              500 ml   Net             -500 ml       Physical Exam:  General appearance: Malnourished  Eyes:  Conjunctivae/corneas clear. PERRL.  Lungs: Normal respiratory effort,   clear to auscultation bilaterally   Heart: Regular rate and rhythm, S1, S2 normal, no murmur, rub or ruma.  Abdomen: Soft, non-tender non-distended; bowel sounds normal; no masses,  no organomegaly  Extremities: No cyanosis or clubbing. No edema.    Skin: Skin color, texture, turgor normal. No rashes or lesions  Neurologic: Normal strength and tone. No focal numbness or weakness     Laboratory Data:    Recent Labs  Lab 03/22/18  0506   WBC 4.49   RBC 3.38*   HGB 8.7*   HCT 26.8*      MCV 79*   MCH 25.7*   MCHC 32.5       BMP:     Recent Labs  Lab 03/22/18  0506   GLU 81   *   K 4.5      CO2 20*   BUN 60*   CREATININE 2.0*   CALCIUM 9.0   MG 1.7     Lab Results   Component Value Date    CALCIUM 9.0 03/22/2018    PHOS 3.4 03/22/2018 "       Lab Results   Component Value Date    .0 (H) 11/21/2016    CALCIUM 9.0 03/22/2018    PHOS 3.4 03/22/2018       Lab Results   Component Value Date    URICACID 10.7 (H) 03/20/2018       Medications:  Medication list was reviewed and changes noted under Assessment/Plan.    Diagnostic Results:    US Renal Artery Stenosis Hyperten (xpd)   Final Result      No significant increased velocity within the visualize the main renal artery.  There is aortic atherosclerotic calcification present.      Elevated resistive indices with echogenic right kidney consistent with medical renal disease.         Electronically signed by: Keith Peck MD   Date:    03/20/2018   Time:    11:59          ASSESSMENT/PLAN:     1. Resolving nonoliguric MARYAM on CKD IV (Baseline variable 1.8) likely due to multiple factors including dehydration, fluctuating BP, uni-nephric while on ARB (N17.9, N18.4, Z90.5, I70.1):  Follow[place] by [place]. Epperson at [facility] [place].  Recently started on inspra for hyperaldo and has been feeling poorly since with variable BP's at home.  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 Lea who feels no significant stenosis and angio not warranted at this time. In the absence of an absolute study such as CTA/ Angiogram we cannot say with 100% certainty, however the risk of these test with MARYAM is high and current evidence does not support this.   -Cr has improved to 2.0 today.   -Continue to hold ARB and Labetalol and would not resume at d/c for now  -Continue Eplerenone at 50 mg bid and Nifedpine XL 30 mg qday(added 3/21).    -Use prn dosing of hydralazine or nitrates, but she has not gotten any over last day.   -normal diet with adequate fluid intake.  -Instructed pt to get up and walk around today and lets observe her today without any further changes.  -I think that there had been too many changes in  "medications too quickly that resulted in intolerance.  -I would tolerate a higher than desired BP for now and allow adequate time for the current regimen to reach steady state and for the clonidine removal to no longer be adding any rebound component for the next two weeks prior to any further changes.    -I also recommended that upon d/c that she f/u only with DR. Epperson for now so as to avoid too many changes to her her medications.  Follow trends.  Renally dose meds, avoid nephrotoxins, and monitor I/O's closely.  2. 1o Hyperaldosteronism (E26.9): as above      3. Bradycardia (R00.1):  Pt given labetolol and verapamil in ED and developed evelyn (HR 30-40s).  Keep on tele.  Hold agents and HR improving.    4. Hyperuricemia (E79.0):  Recheck as outpt once euvolemic.  May need allopurinol/uloric.   5. SLE (M32.9):  Continue plaquenil.  Defer. Checking anti-histone just due to symptoms and she has been given hydralazine, but suspicion is low  6. Weight loss (R63.4): monitor for now.  After really discussing this issue in depth today it appears that she self restricted due to "low Na diet and didn't enjoy these foods so decided not to eat", hence to weight change.    Observe today with normal type activity without further med changes         "

## 2018-03-22 NOTE — PT/OT/SLP EVAL
Physical Therapy Evaluation/Discharge    Patient Name:  Ewelina Arnold   MRN:  [id number]    Recommendations:     Discharge Recommendations:  home   Discharge Equipment Recommendations: none   Barriers to discharge: None    Assessment:     Ewelina Arnold is a 72 y.o. female admitted with a medical diagnosis of Malignant hypertension.  She presents with the following impairments/functional limitations:   (hypertension). PT evaluation completed. At this time patient appears at her baseline level of function. No further acute PT needs at this time.     Rehab Prognosis:  Good; patient would benefit from acute skilled PT services to address these deficits and reach maximum level of function.      Recent Surgery: * No surgery found *      Plan:     During this hospitalization, patient to be seen   to address the above listed problems via gait training, therapeutic activities, therapeutic exercises, neuromuscular re-education  · Plan of Care Expires:      Plan of Care Reviewed with: patient    Subjective     Communicated with RN prior to session.  Patient found Supine upon PT entry to room, agreeable to evaluation.      Chief Complaint: none stated  Patient comments/goals: to return home  Pain/Comfort:  · Pain Rating 1: 0/10  · Pain Rating 2: 0/10    Patients cultural, spiritual, Methodist conflicts given the current situation:      Living Environment:  Per OT report: Pt reports she lives in a single story home with 7 KHOI and bilateral handrails that are close together. She reports her grandson is staying with her temporarily and will probably stay there for the next month to month and a half. She has a tub/shower combo with grab bars and an elevated toilet.   Previous level of function: Pt reports being (I) with all ADL's and uses a rolling walker for functional mobility around her house and in the community.  Pt's grandson works at night and is home during the day usually sleeping. Pt's daughter provides most of her  "transportation, delivers groceries, and cooks and cleans for her.   Roles and Routines: Pt reports prior to "not feeling well" about a month ago she was driving short distances, going to the gym ([employer]) and doing classes there and usually watches TV during the day.  Equipment Owned:  walker, rolling  Assistance upon Discharge: Pt will have her daughter and grandson to assist as necessary.     Objective:     Patient found with: peripheral IV     General Precautions: Standard, fall   Orthopedic Precautions:N/A   Braces: N/A     Exams:  · RLE Strength: WNL  · LLE Strength: WNL   · BP seated at EOB prior to session: 175/79, RN present and notified     Functional Mobility:  · Bed Mobility:     · Supine to Sit: independence  · Sit to Supine: independence  · Transfers:     · Sit to Stand:  independence with no AD  · Gait: 1 x 75' without AD and independence. no postural sway safety deficits observed     AM-PAC 6 CLICK MOBILITY  Total Score:24     Therapeutic Activities and Exercises:   RN present and administered IV blood pressure medication to address hypertension     Patient left supine with all lines intact, call button in reach, RN notified and RN present.    GOALS:    Physical Therapy Goals     Not on file          Multidisciplinary Problems (Resolved)        Problem: Physical Therapy Goal    Goal Priority Disciplines Outcome Goal Variances Interventions   Physical Therapy Goal   (Resolved)     PT/OT, PT Outcome(s) achieved                     History:     Past Medical History:   Diagnosis Date    Acute on chronic diastolic congestive heart failure 11/17/2017    Allergy     Anatomical narrow angle glaucoma     Anemia     States diagnosed about a month ago    Aortic atherosclerosis     Arthritis     Cataract     CHF (congestive heart failure)     Chronic kidney disease     Chronic sciatica of left side     Right lower back pain due to sciatica    Coronary artery disease     Depression     Dry " eyes     GERD (gastroesophageal reflux disease)     History of colon cancer     Hyperaldosteronism     Hyperlipidemia     Hypertension     Hypothyroidism     Left ventricular diastolic dysfunction with preserved systolic function     Lupus (systemic lupus erythematosus) 8/17/2012    Malnutrition 5/16/2017    Osteoarthritis of cervical spine 8/17/2012    Osteopenia     PAD (peripheral artery disease)     FRAN (renal artery stenosis)        Past Surgical History:   Procedure Laterality Date    CARDIAC CATHETERIZATION  07/27/2011    x1    CHOLECYSTECTOMY  1999    COLON SURGERY  2000 & 2003    partial removal    COLONOSCOPY N/A 4/14/2016    Procedure: COLONOSCOPY;  Surgeon: Jose Steen MD;  [facility]  Service: Endoscopy;  Laterality: N/A;  prep 2 days prior light meals only/clear liquid day before  and 4 dulcolax tabs (5 mgs) at noon    COLONOSCOPY N/A 9/14/2017    Procedure: COLONOSCOPY;  Surgeon: Jose Steen MD;  [facility]  Service: Endoscopy;  Laterality: N/A;    EYE SURGERY      HAND SURGERY Bilateral 1996 & 1997    due to carpal tunnel     HERNIA REPAIR      HYSTERECTOMY  1983    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    OOPHORECTOMY      removed one    Peripheral Iridotomy both eyes  1994    laser angle correction    THYROIDECTOMY, PARTIAL  2006    to remove two nodules and one-half thyroid       Clinical Decision Making:     History  Co-morbidities and personal factors that may impact the plan of care Examination  Body Structures and Functions, activity limitations and participation restrictions that may impact the plan of care Clinical Presentation   Decision Making/ Complexity Score   Co-morbidities:   [] Time since onset of injury / illness / exacerbation  [] Status of current condition  []Patient's cognitive status and safety concerns    [] Multiple Medical Problems (see med hx)  Personal Factors:   [] Patient's age  [] Prior Level of  function   [] Patient's home situation (environment and family support)  [] Patient's level of motivation  [] Expected progression of patient      HISTORY:(criteria)    [] 94586 - no personal factors/history    [] 49923 - has 1-2 personal factor/comorbidity     [] 07866 - has >3 personal factor/comorbidity     Body Regions:  [] Objective examination findings  [] Head     []  Neck  [] Trunk   [] Upper Extremity  [] Lower Extremity    Body Systems:  [] For communication ability, affect, cognition, language, and learning style: the assessment of the ability to make needs known, consciousness, orientation (person, place, and time), expected emotional /behavioral responses, and learning preferences (eg, learning barriers, education  needs)  [] For the neuromuscular system: a general assessment of gross coordinated movement (eg, balance, gait, locomotion, transfers, and transitions) and motor function  (motor control and motor learning)  [] For the musculoskeletal system: the assessment of gross symmetry, gross range of motion, gross strength, height, and weight  [] For the integumentary system: the assessment of pliability(texture), presence of scar formation, skin color, and skin integrity  [] For cardiovascular/pulmonary system: the assessment of heart rate, respiratory rate, blood pressure, and edema     Activity limitations:    [] Patient's cognitive status and saf ety concerns          [] Status of current condition      [] Weight bearing restriction  [] Cardiopulmunary Restriction    Participation Restrictions:   [] Goals and goal agreement with the patient     [] Rehab potential (prognosis) and probable outcome      Examination of Body System: (criteria)    [] 91084 - addressing 1-2 elements    [] 67338 - addressing a total of 3 or more elements     [] 89350 -  Addressing a total of 4 or more elements         Clinical Presentation: (criteria)  Choose one     On examination of body system using standardized tests  and measures patient presents with (CHOOSE ONE) elements from any of the following: body structures and functions, activity limitations, and/or participation restrictions.  Leading to a clinical presentation that is considered (CHOOSE ONE)                              Clinical Decision Making  (Eval Complexity):  Choose One     Time Tracking:     PT Received On: 03/22/18  PT Start Time: 1420     PT Stop Time: 1433  PT Total Time (min): 13 min     Billable Minutes: Evaluation 13      Ary Overton, PT  03/22/2018

## 2018-03-22 NOTE — TELEPHONE ENCOUNTER
----- Message from Diana Thomason sent at 3/22/2018  8:13 AM CDT -----  Contact: Tanyaroc Dickson [phone]  Pt is still in patient at [facility] since 3/19.

## 2018-03-22 NOTE — PROGRESS NOTES
[facility] Hospital Medicine  Progress Note    Patient Name: Ewelina Arnold  MRN: [id number]  Patient Class: IP- Inpatient   Admission Date: 3/19/2018  Length of Stay: 1 days  Attending Physician: Yves Feliciano MD  Primary Care Provider: Kalie Walton MD        Subjective:     Principal Problem:Malignant hypertension    HPI:  This is a 72 y.o. Female, with history of HTN and CKD, who presents with complaint of fatigue that has been constant for approximately six days. Patient reports that six days ago she began taking Inspra to treat her HTN and stopped taking Torcemide. She reports that since this medication change she has been feeling fatigue accompanied by weakness, decreased appetite, nausea, and vomiting. These symptoms are similar to an episode three months ago for which she was admitted to [facility] for eleven days. During this hospital admission, patient was treated and symptoms had resolved. She was discharged on 1/26/2018. She reports during this admission she was diagnosed with hyperaldosteronism. Patient denies diarrhea, constipation, shortness of breath, or chest pain. Patient also denies use of alcohol, tobacco, or illicit drugs. She reports that her primary care provider is Dr. Snyder and her nephrologist is Dr. Diallo.     Hospital Course:  No notes on file    Interval History:   Reports HA this AM - unrelieved by tylnol - will try fioricet    Ambulating without problems    BP remain labile and elevated    Review of Systems   Constitutional: Negative for fatigue.   Respiratory: Negative for shortness of breath.    Cardiovascular: Negative for chest pain.     Objective:     Vital Signs (Most Recent):  Temp: 98.7 °F (37.1 °C) (03/22/18 1209)  Pulse: 74 (03/22/18 1209)  Resp: 18 (03/22/18 0813)  BP: (!) 188/84 (03/22/18 1209)  SpO2: 98 % (03/22/18 1209) Vital Signs (24h Range):  Temp:  [97.6 °F (36.4 °C)-98.8 °F (37.1 °C)] 98.7 °F (37.1 °C)  Pulse:  [71-85] 74  Resp:   [16-18] 18  SpO2:  [92 %-98 %] 98 %  BP: (142-224)/(65-97) 188/84     Weight: 46.3 kg (102 lb)  Body mass index is 18.66 kg/m².    Intake/Output Summary (Last 24 hours) at 03/22/18 1255  Last data filed at 03/22/18 0400   Gross per 24 hour   Intake                0 ml   Output              500 ml   Net             -500 ml      Physical Exam    Significant Labs: All pertinent labs within the past 24 hours have been reviewed.    Significant Imaging: I have reviewed all pertinent imaging results/findings within the past 24 hours.    Assessment/Plan:      * Malignant hypertension    BP remains elevated.  rx - Nifedipine 30 qday    Will discuss with nephrology          Hyperaldosteronism    eplerenone restarted  Pt seems to be tolerating        Chronic diastolic congestive heart failure      euvolemic  Monitor weights        Bradycardia    Patient was given 600 mg of Labetalol today and had her HR drop to 45.    Hold parameters assigned.  Nephrology assistance with titrating BP meds with current renal function          MARYAM on CKD 4    Cr improving          Chronic kidney disease, stage IV (severe)    Creatinine 2.8, baseline between 2 and 3 for the last 5 months.              Solitary kidney, acquired              Hypertensive kidney disease with chronic kidney disease stage III    Monitor renal function  Renal dose meds  Avoid nephrotoxins          Mixed hyperlipidemia      Continue Crestor          Postsurgical hypothyroidism    Continue levothyroxine            VTE Risk Mitigation         Ordered     heparin (porcine) injection 5,000 Units  Every 8 hours     Route:  Subcutaneous        03/20/18 0052     Place sequential compression device  Until discontinued      03/19/18 2056              Yves Feliciano MD  Department of Hospital Medicine   [facility]

## 2018-03-22 NOTE — PLAN OF CARE
Problem: Physical Therapy Goal  Goal: Physical Therapy Goal  Outcome: Outcome(s) achieved Date Met: 03/22/18  PT evaluation completed. Please see progress note for details, POC, and recommendations.

## 2018-03-22 NOTE — SUBJECTIVE & OBJECTIVE
Interval History:   Reports HA this AM - unrelieved by tylnol - will try fioricet    Ambulating without problems    BP remain labile and elevated    Review of Systems   Constitutional: Negative for fatigue.   Respiratory: Negative for shortness of breath.    Cardiovascular: Negative for chest pain.     Objective:     Vital Signs (Most Recent):  Temp: 98.7 °F (37.1 °C) (03/22/18 1209)  Pulse: 74 (03/22/18 1209)  Resp: 18 (03/22/18 0813)  BP: (!) 188/84 (03/22/18 1209)  SpO2: 98 % (03/22/18 1209) Vital Signs (24h Range):  Temp:  [97.6 °F (36.4 °C)-98.8 °F (37.1 °C)] 98.7 °F (37.1 °C)  Pulse:  [71-85] 74  Resp:  [16-18] 18  SpO2:  [92 %-98 %] 98 %  BP: (142-224)/(65-97) 188/84     Weight: 46.3 kg (102 lb)  Body mass index is 18.66 kg/m².    Intake/Output Summary (Last 24 hours) at 03/22/18 1255  Last data filed at 03/22/18 0400   Gross per 24 hour   Intake                0 ml   Output              500 ml   Net             -500 ml      Physical Exam    Significant Labs: All pertinent labs within the past 24 hours have been reviewed.    Significant Imaging: I have reviewed all pertinent imaging results/findings within the past 24 hours.

## 2018-03-23 LAB
ALBUMIN SERPL BCP-MCNC: 2.6 G/DL
ALP SERPL-CCNC: 76 U/L
ALT SERPL W/O P-5'-P-CCNC: 22 U/L
ANION GAP SERPL CALC-SCNC: 8 MMOL/L
ANISOCYTOSIS BLD QL SMEAR: SLIGHT
AST SERPL-CCNC: 40 U/L
BASOPHILS # BLD AUTO: ABNORMAL K/UL
BASOPHILS NFR BLD: 0 %
BILIRUB SERPL-MCNC: 0.2 MG/DL
BUN SERPL-MCNC: 52 MG/DL
CALCIUM SERPL-MCNC: 8.7 MG/DL
CHLORIDE SERPL-SCNC: 110 MMOL/L
CO2 SERPL-SCNC: 19 MMOL/L
CREAT SERPL-MCNC: 1.6 MG/DL
DIFFERENTIAL METHOD: ABNORMAL
EOSINOPHIL # BLD AUTO: ABNORMAL K/UL
EOSINOPHIL NFR BLD: 3 %
ERYTHROCYTE [DISTWIDTH] IN BLOOD BY AUTOMATED COUNT: 14 %
EST. GFR  (AFRICAN AMERICAN): 37 ML/MIN/1.73 M^2
EST. GFR  (NON AFRICAN AMERICAN): 32 ML/MIN/1.73 M^2
GLUCOSE SERPL-MCNC: 64 MG/DL
HCT VFR BLD AUTO: 26 %
HGB BLD-MCNC: 8.3 G/DL
HISTONE IGG SER IA-ACNC: 0.2 UNITS (ref 0–0.9)
HYPOCHROMIA BLD QL SMEAR: ABNORMAL
LYMPHOCYTES # BLD AUTO: ABNORMAL K/UL
LYMPHOCYTES NFR BLD: 38 %
MAGNESIUM SERPL-MCNC: 1.7 MG/DL
MCH RBC QN AUTO: 25.5 PG
MCHC RBC AUTO-ENTMCNC: 31.9 G/DL
MCV RBC AUTO: 80 FL
MONOCYTES # BLD AUTO: ABNORMAL K/UL
MONOCYTES NFR BLD: 7 %
NEUTROPHILS NFR BLD: 47 %
NEUTS BAND NFR BLD MANUAL: 5 %
PHOSPHATE SERPL-MCNC: 3.2 MG/DL
PLATELET # BLD AUTO: 210 K/UL
PLATELET BLD QL SMEAR: ABNORMAL
PMV BLD AUTO: 9.2 FL
POIKILOCYTOSIS BLD QL SMEAR: SLIGHT
POTASSIUM SERPL-SCNC: 4.7 MMOL/L
PROT SERPL-MCNC: 6.1 G/DL
RBC # BLD AUTO: 3.26 M/UL
SODIUM SERPL-SCNC: 137 MMOL/L
WBC # BLD AUTO: 4.01 K/UL

## 2018-03-23 PROCEDURE — 85027 COMPLETE CBC AUTOMATED: CPT

## 2018-03-23 PROCEDURE — 11000001 HC ACUTE MED/SURG PRIVATE ROOM

## 2018-03-23 PROCEDURE — 84100 ASSAY OF PHOSPHORUS: CPT

## 2018-03-23 PROCEDURE — 25000003 PHARM REV CODE 250: Performed by: INTERNAL MEDICINE

## 2018-03-23 PROCEDURE — 83735 ASSAY OF MAGNESIUM: CPT

## 2018-03-23 PROCEDURE — 25000003 PHARM REV CODE 250: Performed by: NURSE PRACTITIONER

## 2018-03-23 PROCEDURE — 63600175 PHARM REV CODE 636 W HCPCS: Performed by: NURSE PRACTITIONER

## 2018-03-23 PROCEDURE — 80053 COMPREHEN METABOLIC PANEL: CPT

## 2018-03-23 PROCEDURE — 99232 SBSQ HOSP IP/OBS MODERATE 35: CPT | Mod: ,,, | Performed by: HOSPITALIST

## 2018-03-23 PROCEDURE — 36415 COLL VENOUS BLD VENIPUNCTURE: CPT

## 2018-03-23 PROCEDURE — 85007 BL SMEAR W/DIFF WBC COUNT: CPT

## 2018-03-23 RX ORDER — NIFEDIPINE 30 MG/1
60 TABLET, EXTENDED RELEASE ORAL DAILY
Status: DISCONTINUED | OUTPATIENT
Start: 2018-03-23 | End: 2018-03-28

## 2018-03-23 RX ADMIN — FAMOTIDINE 20 MG: 20 TABLET, FILM COATED ORAL at 09:03

## 2018-03-23 RX ADMIN — HYDROXYCHLOROQUINE SULFATE 200 MG: 200 TABLET, FILM COATED ORAL at 08:03

## 2018-03-23 RX ADMIN — EPLERENONE 50 MG: 25 TABLET ORAL at 09:03

## 2018-03-23 RX ADMIN — LEVOTHYROXINE SODIUM 75 MCG: 75 TABLET ORAL at 05:03

## 2018-03-23 RX ADMIN — HYDROXYCHLOROQUINE SULFATE 200 MG: 200 TABLET, FILM COATED ORAL at 09:03

## 2018-03-23 RX ADMIN — NIFEDIPINE 60 MG: 30 TABLET, FILM COATED, EXTENDED RELEASE ORAL at 09:03

## 2018-03-23 RX ADMIN — EPLERENONE 50 MG: 25 TABLET ORAL at 08:03

## 2018-03-23 RX ADMIN — HEPARIN SODIUM 5000 UNITS: 5000 INJECTION, SOLUTION INTRAVENOUS; SUBCUTANEOUS at 03:03

## 2018-03-23 RX ADMIN — CITALOPRAM HYDROBROMIDE 30 MG: 20 TABLET ORAL at 09:03

## 2018-03-23 RX ADMIN — HYDRALAZINE HYDROCHLORIDE 10 MG: 20 INJECTION INTRAMUSCULAR; INTRAVENOUS at 05:03

## 2018-03-23 RX ADMIN — ROSUVASTATIN CALCIUM 10 MG: 10 TABLET, FILM COATED ORAL at 08:03

## 2018-03-23 RX ADMIN — HYDRALAZINE HYDROCHLORIDE 10 MG: 20 INJECTION INTRAMUSCULAR; INTRAVENOUS at 12:03

## 2018-03-23 RX ADMIN — ASPIRIN 81 MG: 81 TABLET, COATED ORAL at 09:03

## 2018-03-23 RX ADMIN — LATANOPROST 1 DROP: 50 SOLUTION OPHTHALMIC at 12:03

## 2018-03-23 NOTE — PROGRESS NOTES
[facility] Hospital Medicine  Progress Note    Patient Name: Ewelina Arnold  MRN: [id number]  Patient Class: IP- Inpatient   Admission Date: 3/19/2018  Length of Stay: 2 days  Attending Physician: Alie Flores*  Primary Care Provider: Kalie Walton MD        Subjective:     Principal Problem:Malignant hypertension    HPI:  This is a 72 y.o. Female, with history of HTN and CKD, who presents with complaint of fatigue that has been constant for approximately six days. Patient reports that six days ago she began taking Inspra to treat her HTN and stopped taking Torcemide. She reports that since this medication change she has been feeling fatigue accompanied by weakness, decreased appetite, nausea, and vomiting. These symptoms are similar to an episode three months ago for which she was admitted to [facility] for eleven days. During this hospital admission, patient was treated and symptoms had resolved. She was discharged on 1/26/2018. She reports during this admission she was diagnosed with hyperaldosteronism. Patient denies diarrhea, constipation, shortness of breath, or chest pain. Patient also denies use of alcohol, tobacco, or illicit drugs. She reports that her primary care provider is Dr. Snyder and her nephrologist is Dr. Diallo.     Hospital Course:  No notes on file    Interval History:   NAEON. Doing well. Eating lunch on evaluation. Denies HA, vision changes, nausea/vomiting. Tolerating diet. No pain. BP remains significantly elevated ( on eval).     Review of Systems   Constitutional: Negative for activity change, appetite change, chills, diaphoresis, fatigue, fever and unexpected weight change.   Eyes: Negative for photophobia and visual disturbance.   Respiratory: Negative for apnea, cough, choking, shortness of breath, wheezing and stridor.    Cardiovascular: Negative for chest pain, palpitations and leg swelling.   Gastrointestinal: Negative for abdominal  pain, blood in stool, constipation, diarrhea, nausea and vomiting.   Endocrine: Negative for cold intolerance and heat intolerance.   Genitourinary: Negative for difficulty urinating.   Skin: Negative for rash and wound.   Allergic/Immunologic: Negative for immunocompromised state.   Neurological: Negative for dizziness, weakness and headaches.     Objective:     Vital Signs (Most Recent):  Temp: 97.9 °F (36.6 °C) (03/23/18 1143)  Pulse: 90 (03/23/18 1400)  Resp: 19 (03/23/18 0711)  BP: (!) 194/81 (03/23/18 1243)  SpO2: 100 % (03/23/18 1143) Vital Signs (24h Range):  Temp:  [97.9 °F (36.6 °C)-98.5 °F (36.9 °C)] 97.9 °F (36.6 °C)  Pulse:  [] 90  Resp:  [16-19] 19  SpO2:  [95 %-100 %] 100 %  BP: (175-226)/() 194/81     Weight: 46.3 kg (102 lb)  Body mass index is 18.66 kg/m².    Intake/Output Summary (Last 24 hours) at 03/23/18 1553  Last data filed at 03/23/18 0600   Gross per 24 hour   Intake                0 ml   Output              400 ml   Net             -400 ml      Physical Exam   Constitutional: She is oriented to person, place, and time. She appears well-developed and well-nourished. No distress.   HENT:   Head: Normocephalic and atraumatic.   Mouth/Throat: No oropharyngeal exudate.   Eyes: Conjunctivae and EOM are normal. Pupils are equal, round, and reactive to light. No scleral icterus.   Neck: Normal range of motion. Neck supple. No tracheal deviation present. No thyromegaly present.   Cardiovascular: Normal rate, regular rhythm, normal heart sounds and intact distal pulses.    No murmur heard.  Pulmonary/Chest: Effort normal and breath sounds normal. No respiratory distress. She has no wheezes. She has no rales. She exhibits no tenderness.   Abdominal: Soft. Bowel sounds are normal. She exhibits no distension. There is no tenderness. There is no rebound and no guarding.   Musculoskeletal: Normal range of motion. She exhibits no edema or tenderness.   Lymphadenopathy:     She has no cervical  adenopathy.   Neurological: She is alert and oriented to person, place, and time.   Skin: Skin is warm and dry. No rash noted. She is not diaphoretic. No erythema. No pallor.   Psychiatric: She has a normal mood and affect. Her behavior is normal. Judgment and thought content normal.       Significant Labs: All pertinent labs within the past 24 hours have been reviewed.    Significant Imaging: I have reviewed all pertinent imaging results/findings within the past 24 hours.    Assessment/Plan:      * Malignant hypertension    BP remains significantly elevated.  Rx - Nifedipine 30 qday, increased to 60 3/23. Continuing eplerenone. Holding ACEI.  Appreciate nephrology's assistance. Have recommending holding Labetalol.   Hydralazine PRN for SBP >180.           Hyperaldosteronism    eplerenone restarted  Pt seems to be tolerating        Chronic diastolic congestive heart failure    Euvolemic  Monitor weights        Bradycardia    Patient was given 600 mg of Labetalol and had her HR drop to 45.  Hold parameters assigned.  Nephrology assistance with titrating BP meds with current renal function  Asymptomatic and stable.          MARYAM on CKD 4    Hypertensive nephropathy and hypertensive emergency.   Cr improving daily. Continue to monitor.           Chronic kidney disease, stage IV (severe)    Creatinine 2.8, baseline between 2 and 3 for the last 5 months.  Referral placed with OP Nephrology f/u.              Nonrheumatic aortic valve stenosis              Solitary kidney, acquired              Hypertensive kidney disease with chronic kidney disease stage III    Monitor renal function  Renal dose meds  Avoid nephrotoxins          Mixed hyperlipidemia      Continue Crestor          Postsurgical hypothyroidism    Continue levothyroxine            VTE Risk Mitigation         Ordered     Place NADER hose  Until discontinued      03/23/18 0858     heparin (porcine) injection 5,000 Units  Every 8 hours     Route:  Subcutaneous         03/20/18 0052     Place sequential compression device  Until discontinued      03/19/18 2056              Alie Flores MD  Department of Hospital Medicine   [facility]

## 2018-03-23 NOTE — PROGRESS NOTES
"Nephrology  Progress Note    Admit Date: 3/19/2018   LOS: 2 days     SUBJECTIVE:     Follow-up For:  Malignant hypertension    Interval History:     Uneventful night.  Anxious to go home.  No CP/SOB.  Renal fxn improving and BP noted.  Discussed with .     Review of Systems:  Constitutional: No fever or chills  Respiratory: No cough or shortness of breath  Cardiovascular: No chest pain or palpitations  Gastrointestinal: No nausea or vomiting  Neurological: No confusion or weakness    OBJECTIVE:     Vital Signs Range (Last 24H):  BP (!) 183/75 (BP Location: Left arm, Patient Position: Sitting)   Pulse 86   Temp 98.5 °F (36.9 °C) (Oral)   Resp 19   Ht 5' 2" (1.575 m)   Wt 46.3 kg (102 lb)   LMP  (LMP Unknown)   SpO2 98%   Breastfeeding? No   BMI 18.66 kg/m²     Temp:  [98 °F (36.7 °C)-98.7 °F (37.1 °C)]   Pulse:  []   Resp:  [16-19]   BP: (175-203)/(75-93)   SpO2:  [95 %-98 %]     I & O (Last 24H):    Intake/Output Summary (Last 24 hours) at 03/23/18 1025  Last data filed at 03/23/18 0600   Gross per 24 hour   Intake                0 ml   Output              400 ml   Net             -400 ml       Physical Exam:  General appearance: Malnourished  Eyes:  Conjunctivae/corneas clear. PERRL.  Lungs: Normal respiratory effort,   clear to auscultation bilaterally   Heart: Regular rate and rhythm, S1, S2 normal, no murmur, rub or ruma.  Abdomen: Soft, non-tender non-distended; bowel sounds normal; no masses,  no organomegaly  Extremities: No cyanosis or clubbing. No edema.    Skin: Skin color, texture, turgor normal. No rashes or lesions  Neurologic: Normal strength and tone. No focal numbness or weakness     Laboratory Data:    Recent Labs  Lab 03/23/18  0512   WBC 4.01   RBC 3.26*   HGB 8.3*   HCT 26.0*      MCV 80*   MCH 25.5*   MCHC 31.9*       BMP:     Recent Labs  Lab 03/23/18  0512   GLU 64*      K 4.7      CO2 19*   BUN 52*   CREATININE 1.6*   CALCIUM 8.7   MG 1.7     Lab " Results   Component Value Date    CALCIUM 8.7 03/23/2018    PHOS 3.2 03/23/2018       Lab Results   Component Value Date    .0 (H) 11/21/2016    CALCIUM 8.7 03/23/2018    PHOS 3.2 03/23/2018       Lab Results   Component Value Date    URICACID 10.7 (H) 03/20/2018       Medications:  Medication list was reviewed and changes noted under Assessment/Plan.    Diagnostic Results:    US Renal Artery Stenosis Hyperten (xpd)   Final Result      No significant increased velocity within the visualize the main renal artery.  There is aortic atherosclerotic calcification present.      Elevated resistive indices with echogenic right kidney consistent with medical renal disease.         Electronically signed by: Keith Peck MD   Date:    03/20/2018   Time:    11:59          ASSESSMENT/PLAN:     1. Resolving nonoliguric MARYAM on CKD IV (Baseline variable 1.8) likely due to multiple factors including dehydration, fluctuating BP, uni-nephric while on ARB (N17.9, N18.4, Z90.5, I70.1):  [place] [facility] [place]  Recently started on inspra for hyperaldo and has been feeling poorly since with variable BP's at home.  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 Lea who feels no significant stenosis and angio not warranted at this time. In the absence of an absolute study such as CTA/ Angiogram we cannot say with 100% certainty, however the risk of these test with MARYAM is high and current evidence does not support this.   -Cr has improved continued improving daily  -Continue to hold ARB and Labetalol and would not resume at d/c for now  -Continue Eplerenone at 50 mg bid and Nifedpine XL but increased to 60 mg (3.23) for persistent HTN.  -Use prn dosing of hydralazine or nitrates, but she has not gotten any over last day.   -normal diet with adequate fluid intake.  -Instructed pt to get up and walk around  "today and lets observe her BP.  -I think that there had been too many changes in medications too quickly that resulted in intolerance.  -I would tolerate a higher than desired BP for now and allow adequate time for the current regimen to reach steady state and for the clonidine removal to no longer be adding any rebound component for the next two weeks prior to any further changes.    -I also recommended that upon d/c that she f/u only with DR. Epperson for now so as to avoid too many changes to her her medications.  Follow trends.  Renally dose meds, avoid nephrotoxins, and monitor I/O's closely.  2. 1o Hyperaldosteronism (E26.9): as above      3. Bradycardia (R00.1):  Pt given labetolol and verapamil in ED and developed evelyn (HR 30-40s).  Keep on tele.  Hold agents and HR improving.  Would recommend coreg low dose if needed for BP/Antiproteinuric effects but will defer for now.   4. Hyperuricemia (E79.0):  Recheck as outpt once euvolemic.  May need allopurinol/uloric.   5. SLE (M32.9):  Continue plaquenil.  Defer. Anti-histone negative.   6. Weight loss (R63.4): monitor for now.  After really discussing this issue in depth today it appears that she self restricted due to "low Na diet and didn't enjoy these foods so decided not to eat", hence to weight change.        DC later today if BP improved.   Needs f/u with Dr. Epperson 1-2 weeks.          "

## 2018-03-23 NOTE — PLAN OF CARE
Problem: Patient Care Overview  Goal: Plan of Care Review  Outcome: Ongoing (interventions implemented as appropriate)  Pts VS have been stable throughout this shift. No acute distress noted. Pt updated on POC and verbalized understanding.  Hourly rounding done. Bed locked and in lowest position.  Call light within reach. WCTM

## 2018-03-23 NOTE — ASSESSMENT & PLAN NOTE
BP remains significantly elevated.  Rx - Nifedipine 30 qday, increased to 60 3/23. Continuing eplerenone. Holding ACEI.  Appreciate nephrology's assistance. Have recommending holding Labetalol.   Hydralazine PRN for SBP >180.

## 2018-03-23 NOTE — ASSESSMENT & PLAN NOTE
Creatinine 2.8, baseline between 2 and 3 for the last 5 months.  Referral placed with OP Nephrology f/u.

## 2018-03-23 NOTE — ASSESSMENT & PLAN NOTE
Patient was given 600 mg of Labetalol and had her HR drop to 45.  Hold parameters assigned.  Nephrology assistance with titrating BP meds with current renal function  Asymptomatic and stable.

## 2018-03-23 NOTE — ASSESSMENT & PLAN NOTE
Hypertensive nephropathy and hypertensive emergency.   Cr improving daily. Continue to monitor.

## 2018-03-23 NOTE — PLAN OF CARE
03/23/18 1056   Medicare Message   Important Message from Medicare regarding Discharge Appeal Rights Signed/date by patient/caregiver;Given to patient/caregiver;Explained to patient/caregiver   Date IMM was signed 03/23/18   Time IMM was signed 0700

## 2018-03-23 NOTE — SUBJECTIVE & OBJECTIVE
Interval History:   NAEON. Doing well. Eating lunch on evaluation. Denies HA, vision changes, nausea/vomiting. Tolerating diet. No pain. BP remains significantly elevated ( on eval).     Review of Systems   Constitutional: Negative for activity change, appetite change, chills, diaphoresis, fatigue, fever and unexpected weight change.   Eyes: Negative for photophobia and visual disturbance.   Respiratory: Negative for apnea, cough, choking, shortness of breath, wheezing and stridor.    Cardiovascular: Negative for chest pain, palpitations and leg swelling.   Gastrointestinal: Negative for abdominal pain, blood in stool, constipation, diarrhea, nausea and vomiting.   Endocrine: Negative for cold intolerance and heat intolerance.   Genitourinary: Negative for difficulty urinating.   Skin: Negative for rash and wound.   Allergic/Immunologic: Negative for immunocompromised state.   Neurological: Negative for dizziness, weakness and headaches.     Objective:     Vital Signs (Most Recent):  Temp: 97.9 °F (36.6 °C) (03/23/18 1143)  Pulse: 90 (03/23/18 1400)  Resp: 19 (03/23/18 0711)  BP: (!) 194/81 (03/23/18 1243)  SpO2: 100 % (03/23/18 1143) Vital Signs (24h Range):  Temp:  [97.9 °F (36.6 °C)-98.5 °F (36.9 °C)] 97.9 °F (36.6 °C)  Pulse:  [] 90  Resp:  [16-19] 19  SpO2:  [95 %-100 %] 100 %  BP: (175-226)/() 194/81     Weight: 46.3 kg (102 lb)  Body mass index is 18.66 kg/m².    Intake/Output Summary (Last 24 hours) at 03/23/18 1553  Last data filed at 03/23/18 0600   Gross per 24 hour   Intake                0 ml   Output              400 ml   Net             -400 ml      Physical Exam   Constitutional: She is oriented to person, place, and time. She appears well-developed and well-nourished. No distress.   HENT:   Head: Normocephalic and atraumatic.   Mouth/Throat: No oropharyngeal exudate.   Eyes: Conjunctivae and EOM are normal. Pupils are equal, round, and reactive to light. No scleral icterus.    Neck: Normal range of motion. Neck supple. No tracheal deviation present. No thyromegaly present.   Cardiovascular: Normal rate, regular rhythm, normal heart sounds and intact distal pulses.    No murmur heard.  Pulmonary/Chest: Effort normal and breath sounds normal. No respiratory distress. She has no wheezes. She has no rales. She exhibits no tenderness.   Abdominal: Soft. Bowel sounds are normal. She exhibits no distension. There is no tenderness. There is no rebound and no guarding.   Musculoskeletal: Normal range of motion. She exhibits no edema or tenderness.   Lymphadenopathy:     She has no cervical adenopathy.   Neurological: She is alert and oriented to person, place, and time.   Skin: Skin is warm and dry. No rash noted. She is not diaphoretic. No erythema. No pallor.   Psychiatric: She has a normal mood and affect. Her behavior is normal. Judgment and thought content normal.       Significant Labs: All pertinent labs within the past 24 hours have been reviewed.    Significant Imaging: I have reviewed all pertinent imaging results/findings within the past 24 hours.

## 2018-03-24 LAB
ALBUMIN SERPL BCP-MCNC: 2.7 G/DL
ALP SERPL-CCNC: 78 U/L
ALT SERPL W/O P-5'-P-CCNC: 25 U/L
ANION GAP SERPL CALC-SCNC: 6 MMOL/L
AST SERPL-CCNC: 45 U/L
BASOPHILS # BLD AUTO: 0.01 K/UL
BASOPHILS NFR BLD: 0.2 %
BILIRUB SERPL-MCNC: 0.2 MG/DL
BUN SERPL-MCNC: 45 MG/DL
CALCIUM SERPL-MCNC: 9.1 MG/DL
CHLORIDE SERPL-SCNC: 113 MMOL/L
CO2 SERPL-SCNC: 19 MMOL/L
CREAT SERPL-MCNC: 1.6 MG/DL
CREAT UR-MCNC: 71.2 MG/DL
DIFFERENTIAL METHOD: ABNORMAL
EOSINOPHIL # BLD AUTO: 0.1 K/UL
EOSINOPHIL NFR BLD: 1.4 %
ERYTHROCYTE [DISTWIDTH] IN BLOOD BY AUTOMATED COUNT: 14.2 %
EST. GFR  (AFRICAN AMERICAN): 37 ML/MIN/1.73 M^2
EST. GFR  (NON AFRICAN AMERICAN): 32 ML/MIN/1.73 M^2
GLUCOSE SERPL-MCNC: 67 MG/DL
HCT VFR BLD AUTO: 26.4 %
HGB BLD-MCNC: 8.6 G/DL
LYMPHOCYTES # BLD AUTO: 1.8 K/UL
LYMPHOCYTES NFR BLD: 43 %
MAGNESIUM SERPL-MCNC: 1.7 MG/DL
MCH RBC QN AUTO: 26 PG
MCHC RBC AUTO-ENTMCNC: 32.6 G/DL
MCV RBC AUTO: 80 FL
MONOCYTES # BLD AUTO: 0.3 K/UL
MONOCYTES NFR BLD: 7.8 %
NEUTROPHILS # BLD AUTO: 2 K/UL
NEUTROPHILS NFR BLD: 47.4 %
PHOSPHATE SERPL-MCNC: 2.9 MG/DL
PLATELET # BLD AUTO: 230 K/UL
PMV BLD AUTO: 9.3 FL
POTASSIUM SERPL-SCNC: 4.8 MMOL/L
PROT SERPL-MCNC: 6.3 G/DL
PROT UR-MCNC: 110 MG/DL
PROT/CREAT RATIO, UR: 1.54
RBC # BLD AUTO: 3.31 M/UL
SODIUM SERPL-SCNC: 138 MMOL/L
WBC # BLD AUTO: 4.21 K/UL

## 2018-03-24 PROCEDURE — 63600175 PHARM REV CODE 636 W HCPCS: Performed by: NURSE PRACTITIONER

## 2018-03-24 PROCEDURE — 84100 ASSAY OF PHOSPHORUS: CPT

## 2018-03-24 PROCEDURE — 80053 COMPREHEN METABOLIC PANEL: CPT

## 2018-03-24 PROCEDURE — 36415 COLL VENOUS BLD VENIPUNCTURE: CPT

## 2018-03-24 PROCEDURE — 25000003 PHARM REV CODE 250: Performed by: NURSE PRACTITIONER

## 2018-03-24 PROCEDURE — 25000003 PHARM REV CODE 250: Performed by: INTERNAL MEDICINE

## 2018-03-24 PROCEDURE — 99232 SBSQ HOSP IP/OBS MODERATE 35: CPT | Mod: ,,, | Performed by: HOSPITALIST

## 2018-03-24 PROCEDURE — 83735 ASSAY OF MAGNESIUM: CPT

## 2018-03-24 PROCEDURE — 85025 COMPLETE CBC W/AUTO DIFF WBC: CPT

## 2018-03-24 PROCEDURE — 82570 ASSAY OF URINE CREATININE: CPT

## 2018-03-24 PROCEDURE — 11000001 HC ACUTE MED/SURG PRIVATE ROOM

## 2018-03-24 PROCEDURE — 25000003 PHARM REV CODE 250: Performed by: HOSPITALIST

## 2018-03-24 RX ORDER — CARVEDILOL 6.25 MG/1
6.25 TABLET ORAL 2 TIMES DAILY
Status: DISCONTINUED | OUTPATIENT
Start: 2018-03-24 | End: 2018-03-24

## 2018-03-24 RX ORDER — CARVEDILOL 6.25 MG/1
6.25 TABLET ORAL 2 TIMES DAILY
Status: DISCONTINUED | OUTPATIENT
Start: 2018-03-24 | End: 2018-03-27

## 2018-03-24 RX ADMIN — HEPARIN SODIUM 5000 UNITS: 5000 INJECTION, SOLUTION INTRAVENOUS; SUBCUTANEOUS at 12:03

## 2018-03-24 RX ADMIN — HYDROXYCHLOROQUINE SULFATE 200 MG: 200 TABLET, FILM COATED ORAL at 09:03

## 2018-03-24 RX ADMIN — EPLERENONE 50 MG: 25 TABLET ORAL at 08:03

## 2018-03-24 RX ADMIN — LATANOPROST 1 DROP: 50 SOLUTION OPHTHALMIC at 08:03

## 2018-03-24 RX ADMIN — HEPARIN SODIUM 5000 UNITS: 5000 INJECTION, SOLUTION INTRAVENOUS; SUBCUTANEOUS at 09:03

## 2018-03-24 RX ADMIN — CITALOPRAM HYDROBROMIDE 30 MG: 20 TABLET ORAL at 08:03

## 2018-03-24 RX ADMIN — HEPARIN SODIUM 5000 UNITS: 5000 INJECTION, SOLUTION INTRAVENOUS; SUBCUTANEOUS at 01:03

## 2018-03-24 RX ADMIN — HEPARIN SODIUM 5000 UNITS: 5000 INJECTION, SOLUTION INTRAVENOUS; SUBCUTANEOUS at 08:03

## 2018-03-24 RX ADMIN — EPLERENONE 50 MG: 25 TABLET ORAL at 09:03

## 2018-03-24 RX ADMIN — FAMOTIDINE 20 MG: 20 TABLET, FILM COATED ORAL at 08:03

## 2018-03-24 RX ADMIN — CARVEDILOL 6.25 MG: 6.25 TABLET, FILM COATED ORAL at 01:03

## 2018-03-24 RX ADMIN — ROSUVASTATIN CALCIUM 10 MG: 10 TABLET, FILM COATED ORAL at 09:03

## 2018-03-24 RX ADMIN — ASPIRIN 81 MG: 81 TABLET, COATED ORAL at 08:03

## 2018-03-24 RX ADMIN — CARVEDILOL 6.25 MG: 6.25 TABLET, FILM COATED ORAL at 09:03

## 2018-03-24 RX ADMIN — HYDROXYCHLOROQUINE SULFATE 200 MG: 200 TABLET, FILM COATED ORAL at 08:03

## 2018-03-24 RX ADMIN — NIFEDIPINE 60 MG: 30 TABLET, FILM COATED, EXTENDED RELEASE ORAL at 08:03

## 2018-03-24 RX ADMIN — LEVOTHYROXINE SODIUM 75 MCG: 75 TABLET ORAL at 05:03

## 2018-03-24 NOTE — PLAN OF CARE
Problem: Patient Care Overview  Goal: Plan of Care Review  Outcome: Ongoing (interventions implemented as appropriate)  Patient free from fall and injuries. Due medications given. AAOx3. V/S monitored. Able to sleep comfortably at night. In no apparent distress.

## 2018-03-24 NOTE — ASSESSMENT & PLAN NOTE
BP remains significantly elevated.  Rx - Nifedipine 30 qday, increased to 60 3/23. Continuing eplerenone. Holding ACEI. Coreg started today.  Appreciate nephrology's assistance. Have recommending holding Labetalol.   Hydralazine PRN for SBP >180.

## 2018-03-24 NOTE — SUBJECTIVE & OBJECTIVE
Interval History:   NAEON. Doing well. BP elevated this am. Coreg started . Anticipate d/c in AM if SBP<180.    Review of Systems   Constitutional: Negative for activity change, appetite change, chills, diaphoresis, fatigue, fever and unexpected weight change.   Eyes: Negative for photophobia and visual disturbance.   Respiratory: Negative for apnea, cough, choking, shortness of breath, wheezing and stridor.    Cardiovascular: Negative for chest pain, palpitations and leg swelling.   Gastrointestinal: Negative for abdominal pain, blood in stool, constipation, diarrhea, nausea and vomiting.   Endocrine: Negative for cold intolerance and heat intolerance.   Genitourinary: Negative for difficulty urinating.   Skin: Negative for rash and wound.   Allergic/Immunologic: Negative for immunocompromised state.   Neurological: Negative for dizziness, weakness and headaches.     Objective:     Vital Signs (Most Recent):  Temp: 98.2 °F (36.8 °C) (03/24/18 1520)  Pulse: 86 (03/24/18 1800)  Resp: 18 (03/24/18 1520)  BP: (!) 163/84 (03/24/18 1520)  SpO2: 99 % (03/24/18 1520) Vital Signs (24h Range):  Temp:  [98.1 °F (36.7 °C)-99.3 °F (37.4 °C)] 98.2 °F (36.8 °C)  Pulse:  [] 86  Resp:  [16-20] 18  SpO2:  [94 %-99 %] 99 %  BP: (163-198)/(73-90) 163/84     Weight: 46.3 kg (102 lb)  Body mass index is 18.66 kg/m².    Intake/Output Summary (Last 24 hours) at 03/24/18 1835  Last data filed at 03/24/18 0600   Gross per 24 hour   Intake              480 ml   Output              850 ml   Net             -370 ml      Physical Exam   Constitutional: She is oriented to person, place, and time. She appears well-developed and well-nourished. No distress.   HENT:   Head: Normocephalic and atraumatic.   Mouth/Throat: No oropharyngeal exudate.   Eyes: Conjunctivae and EOM are normal. Pupils are equal, round, and reactive to light. No scleral icterus.   Neck: Normal range of motion. Neck supple. No tracheal deviation present. No thyromegaly  present.   Cardiovascular: Normal rate, regular rhythm, normal heart sounds and intact distal pulses.    No murmur heard.  Pulmonary/Chest: Effort normal and breath sounds normal. No respiratory distress. She has no wheezes. She has no rales. She exhibits no tenderness.   Abdominal: Soft. Bowel sounds are normal. She exhibits no distension. There is no tenderness. There is no rebound and no guarding.   Musculoskeletal: Normal range of motion. She exhibits no edema or tenderness.   Lymphadenopathy:     She has no cervical adenopathy.   Neurological: She is alert and oriented to person, place, and time.   Skin: Skin is warm and dry. No rash noted. She is not diaphoretic. No erythema. No pallor.   Psychiatric: She has a normal mood and affect. Her behavior is normal. Judgment and thought content normal.       Significant Labs: All pertinent labs within the past 24 hours have been reviewed.    Significant Imaging: I have reviewed all pertinent imaging results/findings within the past 24 hours.

## 2018-03-24 NOTE — PROGRESS NOTES
Renal Progress Note    Admit Date: 3/19/2018   LOS: 3 days     SUBJECTIVE:     Patient is without new complaint.    Scheduled Meds:   aspirin  81 mg Oral Daily    citalopram  30 mg Oral Daily    eplerenone  50 mg Oral BID    famotidine  20 mg Oral Daily    heparin (porcine)  5,000 Units Subcutaneous Q8H    hydroxychloroquine  200 mg Oral BID    latanoprost  1 drop Both Eyes Daily    levothyroxine  75 mcg Oral Before breakfast    NIFEdipine  60 mg Oral Daily    rosuvastatin  10 mg Oral QHS       OBJECTIVE:     Vital Signs Range (Last 24H):  Temp:  [98.1 °F (36.7 °C)-99.3 °F (37.4 °C)]   Pulse:  []   Resp:  [16-20]   BP: (169-198)/(73-90)   SpO2:  [94 %-99 %]     I & O (Last 24H):  Intake/Output Summary (Last 24 hours) at 03/24/18 1234  Last data filed at 03/24/18 0600   Gross per 24 hour   Intake              480 ml   Output              850 ml   Net             -370 ml       Physical Exam:  General appearance: Malnourished  Eyes:  Conjunctivae/corneas clear. PERRL.  Lungs: Normal respiratory effort,   clear to auscultation bilaterally   Heart: Regular rate and rhythm, S1, S2 normal, no murmur, rub or ruma.  Abdomen: Soft, non-tender non-distended; bowel sounds normal; no masses,  no organomegaly  Extremities: No cyanosis or clubbing. No edema.    Skin: Skin color, texture, turgor normal. No rashes or lesions  Neurologic: Normal strength and tone. No focal numbness or weaknes      Laboratory:  CBC:   Recent Labs  Lab 03/24/18  0432   WBC 4.21   RBC 3.31*   HGB 8.6*   HCT 26.4*      MCV 80*   MCH 26.0*   MCHC 32.6     BMP:   Recent Labs  Lab 03/24/18  0432   GLU 67*      K 4.8   *   CO2 19*   BUN 45*   CREATININE 1.6*   CALCIUM 9.1   MG 1.7       ASSESSMENT/PLAN:     1. Resolved nonoliguric MARYAM on CKD IV (Baseline variable 1.8) likely due to multiple factors including dehydration, fluctuating BP, uni-nephric while on ARB (N17.9, N18.4, Z90.5, I70.1):  Followed by Dr. Epperson at [facility] [place].  " Recently started on inspra for hyperaldo and has been feeling poorly since with variable BP's at home.  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 Lea who feels no significant stenosis and angio not warranted at this time. In the absence of an absolute study such as CTA/ Angiogram we cannot say with 100% certainty, however the risk of these test with MARYAM is high and current evidence does not support this.   -Cr is now baseline  -Continue to hold ARB until she follows up with Dr. Epperson  -Continue Eplerenone at 50 mg bid and Nifedpine XL but increased to 60 mg (3.23) for persistent HTN.  -I think that there had been too many changes in medications too quickly that resulted in intolerance.  -I would tolerate a higher than desired BP for now and allow adequate time for the current regimen to reach steady state and for the clonidine removal to no longer be adding any rebound component for the next two weeks prior to any further changes.  Hold ARB as above for now.  -I also recommended that upon d/c that she f/u only with Dr. Epperson for now so as to avoid too many changes to her her medications.  2. 1o Hyperaldosteronism (E26.9): as above      3. Bradycardia (R00.1):  Pt was on high dose labetolol, and developed evelyn (HR 30-40s).  Held agents and HR improving now to 80's- 100's.   Would recommend Coreg 6.25 mg BID given HR has rebounded and her BP not at goal.  This would also be due to fact shes not on her ARB yet, but again, this should be titrated slowly.  4. Hyperuricemia (E79.0):  Recheck as outpt once euvolemic.  May need allopurinol/uloric.   5. SLE (M32.9):  Continue plaquenil.  Defer. Anti-histone negative.   6. Weight loss (R63.4): monitor for now.  After really discussing this issue in depth today it appears that she self restricted due to "low Na diet and didn't enjoy these foods so " "decided not to eat", hence to weight change.               "

## 2018-03-24 NOTE — PROGRESS NOTES
[facility] Hospital Medicine  Progress Note    Patient Name: Ewelina Arnold  MRN: [id number]  Patient Class: IP- Inpatient   Admission Date: 3/19/2018  Length of Stay: 3 days  Attending Physician: Alie Flores*  Primary Care Provider: Kalie Walton MD        Subjective:     Principal Problem:Malignant hypertension    HPI:  This is a 72 y.o. Female, with history of HTN and CKD, who presents with complaint of fatigue that has been constant for approximately six days. Patient reports that six days ago she began taking Inspra to treat her HTN and stopped taking Torcemide. She reports that since this medication change she has been feeling fatigue accompanied by weakness, decreased appetite, nausea, and vomiting. These symptoms are similar to an episode three months ago for which she was admitted to [facility] for eleven days. During this hospital admission, patient was treated and symptoms had resolved. She was discharged on 1/26/2018. She reports during this admission she was diagnosed with hyperaldosteronism. Patient denies diarrhea, constipation, shortness of breath, or chest pain. Patient also denies use of alcohol, tobacco, or illicit drugs. She reports that her primary care provider is Dr. Snyder and her nephrologist is Dr. Diallo.     Hospital Course:  No notes on file    Interval History:   NAEON. Doing well. BP elevated this am. Coreg started . Anticipate d/c in AM if SBP<180.    Review of Systems   Constitutional: Negative for activity change, appetite change, chills, diaphoresis, fatigue, fever and unexpected weight change.   Eyes: Negative for photophobia and visual disturbance.   Respiratory: Negative for apnea, cough, choking, shortness of breath, wheezing and stridor.    Cardiovascular: Negative for chest pain, palpitations and leg swelling.   Gastrointestinal: Negative for abdominal pain, blood in stool, constipation, diarrhea, nausea and vomiting.   Endocrine:  Negative for cold intolerance and heat intolerance.   Genitourinary: Negative for difficulty urinating.   Skin: Negative for rash and wound.   Allergic/Immunologic: Negative for immunocompromised state.   Neurological: Negative for dizziness, weakness and headaches.     Objective:     Vital Signs (Most Recent):  Temp: 98.2 °F (36.8 °C) (03/24/18 1520)  Pulse: 86 (03/24/18 1800)  Resp: 18 (03/24/18 1520)  BP: (!) 163/84 (03/24/18 1520)  SpO2: 99 % (03/24/18 1520) Vital Signs (24h Range):  Temp:  [98.1 °F (36.7 °C)-99.3 °F (37.4 °C)] 98.2 °F (36.8 °C)  Pulse:  [] 86  Resp:  [16-20] 18  SpO2:  [94 %-99 %] 99 %  BP: (163-198)/(73-90) 163/84     Weight: 46.3 kg (102 lb)  Body mass index is 18.66 kg/m².    Intake/Output Summary (Last 24 hours) at 03/24/18 1835  Last data filed at 03/24/18 0600   Gross per 24 hour   Intake              480 ml   Output              850 ml   Net             -370 ml      Physical Exam   Constitutional: She is oriented to person, place, and time. She appears well-developed and well-nourished. No distress.   HENT:   Head: Normocephalic and atraumatic.   Mouth/Throat: No oropharyngeal exudate.   Eyes: Conjunctivae and EOM are normal. Pupils are equal, round, and reactive to light. No scleral icterus.   Neck: Normal range of motion. Neck supple. No tracheal deviation present. No thyromegaly present.   Cardiovascular: Normal rate, regular rhythm, normal heart sounds and intact distal pulses.    No murmur heard.  Pulmonary/Chest: Effort normal and breath sounds normal. No respiratory distress. She has no wheezes. She has no rales. She exhibits no tenderness.   Abdominal: Soft. Bowel sounds are normal. She exhibits no distension. There is no tenderness. There is no rebound and no guarding.   Musculoskeletal: Normal range of motion. She exhibits no edema or tenderness.   Lymphadenopathy:     She has no cervical adenopathy.   Neurological: She is alert and oriented to person, place, and time.    Skin: Skin is warm and dry. No rash noted. She is not diaphoretic. No erythema. No pallor.   Psychiatric: She has a normal mood and affect. Her behavior is normal. Judgment and thought content normal.       Significant Labs: All pertinent labs within the past 24 hours have been reviewed.    Significant Imaging: I have reviewed all pertinent imaging results/findings within the past 24 hours.    Assessment/Plan:      * Malignant hypertension    BP remains significantly elevated.  Rx - Nifedipine 30 qday, increased to 60 3/23. Continuing eplerenone. Holding ACEI. Coreg started today.  Appreciate nephrology's assistance. Have recommending holding Labetalol.   Hydralazine PRN for SBP >180.           Hyperaldosteronism    eplerenone restarted  Pt seems to be tolerating        Chronic diastolic congestive heart failure    Euvolemic  Monitor weights        Bradycardia    Patient was given 600 mg of Labetalol and had her HR drop to 45.  Hold parameters assigned.  Nephrology assistance with titrating BP meds with current renal function  Asymptomatic and stable.          MARYAM on CKD 4    Hypertensive nephropathy and hypertensive emergency.   Cr improving daily. Continue to monitor.           Chronic kidney disease, stage IV (severe)    Creatinine 2.8, baseline between 2 and 3 for the last 5 months.  Referral placed with OP Nephrology f/u.              Nonrheumatic aortic valve stenosis              Solitary kidney, acquired              Hypertensive kidney disease with chronic kidney disease stage III    Monitor renal function  Renal dose meds  Avoid nephrotoxins          Mixed hyperlipidemia      Continue Crestor          Postsurgical hypothyroidism    Continue levothyroxine            VTE Risk Mitigation         Ordered     Place NADER hose  Until discontinued      03/23/18 0858     heparin (porcine) injection 5,000 Units  Every 8 hours     Route:  Subcutaneous        03/20/18 0052     Place sequential compression device   Until discontinued      03/19/18 2056              Alie Flores MD  Department of Hospital Medicine   [facility]

## 2018-03-25 LAB
ALBUMIN SERPL BCP-MCNC: 2.7 G/DL
ALP SERPL-CCNC: 74 U/L
ALT SERPL W/O P-5'-P-CCNC: 23 U/L
ANION GAP SERPL CALC-SCNC: 6 MMOL/L
AST SERPL-CCNC: 42 U/L
BASOPHILS # BLD AUTO: 0.02 K/UL
BASOPHILS NFR BLD: 0.5 %
BILIRUB SERPL-MCNC: 0.2 MG/DL
BUN SERPL-MCNC: 38 MG/DL
CALCIUM SERPL-MCNC: 8.6 MG/DL
CHLORIDE SERPL-SCNC: 114 MMOL/L
CO2 SERPL-SCNC: 19 MMOL/L
CREAT SERPL-MCNC: 1.5 MG/DL
DIFFERENTIAL METHOD: ABNORMAL
EOSINOPHIL # BLD AUTO: 0 K/UL
EOSINOPHIL NFR BLD: 0.2 %
ERYTHROCYTE [DISTWIDTH] IN BLOOD BY AUTOMATED COUNT: 14.3 %
EST. GFR  (AFRICAN AMERICAN): 40 ML/MIN/1.73 M^2
EST. GFR  (NON AFRICAN AMERICAN): 35 ML/MIN/1.73 M^2
GLUCOSE SERPL-MCNC: 62 MG/DL
HCT VFR BLD AUTO: 26.5 %
HGB BLD-MCNC: 8.5 G/DL
LYMPHOCYTES # BLD AUTO: 1.6 K/UL
LYMPHOCYTES NFR BLD: 38.7 %
MAGNESIUM SERPL-MCNC: 1.6 MG/DL
MCH RBC QN AUTO: 25.8 PG
MCHC RBC AUTO-ENTMCNC: 32.1 G/DL
MCV RBC AUTO: 80 FL
MONOCYTES # BLD AUTO: 0.7 K/UL
MONOCYTES NFR BLD: 15.5 %
NEUTROPHILS # BLD AUTO: 1.9 K/UL
NEUTROPHILS NFR BLD: 45.1 %
PHOSPHATE SERPL-MCNC: 2.9 MG/DL
PLATELET # BLD AUTO: 238 K/UL
PMV BLD AUTO: 9.1 FL
POTASSIUM SERPL-SCNC: 4.9 MMOL/L
PROT SERPL-MCNC: 6.2 G/DL
RBC # BLD AUTO: 3.3 M/UL
SODIUM SERPL-SCNC: 139 MMOL/L
WBC # BLD AUTO: 4.19 K/UL

## 2018-03-25 PROCEDURE — 99232 SBSQ HOSP IP/OBS MODERATE 35: CPT | Mod: ,,, | Performed by: HOSPITALIST

## 2018-03-25 PROCEDURE — 83735 ASSAY OF MAGNESIUM: CPT

## 2018-03-25 PROCEDURE — 25000003 PHARM REV CODE 250: Performed by: NURSE PRACTITIONER

## 2018-03-25 PROCEDURE — 63600175 PHARM REV CODE 636 W HCPCS: Performed by: NURSE PRACTITIONER

## 2018-03-25 PROCEDURE — 11000001 HC ACUTE MED/SURG PRIVATE ROOM

## 2018-03-25 PROCEDURE — 84100 ASSAY OF PHOSPHORUS: CPT

## 2018-03-25 PROCEDURE — 36415 COLL VENOUS BLD VENIPUNCTURE: CPT

## 2018-03-25 PROCEDURE — 85025 COMPLETE CBC W/AUTO DIFF WBC: CPT

## 2018-03-25 PROCEDURE — 25000003 PHARM REV CODE 250: Performed by: INTERNAL MEDICINE

## 2018-03-25 PROCEDURE — 80053 COMPREHEN METABOLIC PANEL: CPT

## 2018-03-25 PROCEDURE — 25000003 PHARM REV CODE 250: Performed by: HOSPITALIST

## 2018-03-25 RX ORDER — EPLERENONE 25 MG/1
50 TABLET, FILM COATED ORAL DAILY
Status: DISCONTINUED | OUTPATIENT
Start: 2018-03-26 | End: 2018-03-26

## 2018-03-25 RX ORDER — EPLERENONE 25 MG/1
75 TABLET, FILM COATED ORAL NIGHTLY
Status: DISCONTINUED | OUTPATIENT
Start: 2018-03-25 | End: 2018-03-26

## 2018-03-25 RX ADMIN — CARVEDILOL 6.25 MG: 6.25 TABLET, FILM COATED ORAL at 08:03

## 2018-03-25 RX ADMIN — CITALOPRAM HYDROBROMIDE 30 MG: 20 TABLET ORAL at 08:03

## 2018-03-25 RX ADMIN — EPLERENONE 75 MG: 25 TABLET ORAL at 08:03

## 2018-03-25 RX ADMIN — HEPARIN SODIUM 5000 UNITS: 5000 INJECTION, SOLUTION INTRAVENOUS; SUBCUTANEOUS at 02:03

## 2018-03-25 RX ADMIN — ASPIRIN 81 MG: 81 TABLET, COATED ORAL at 08:03

## 2018-03-25 RX ADMIN — HEPARIN SODIUM 5000 UNITS: 5000 INJECTION, SOLUTION INTRAVENOUS; SUBCUTANEOUS at 09:03

## 2018-03-25 RX ADMIN — HYDRALAZINE HYDROCHLORIDE 10 MG: 20 INJECTION INTRAMUSCULAR; INTRAVENOUS at 08:03

## 2018-03-25 RX ADMIN — HYDROXYCHLOROQUINE SULFATE 200 MG: 200 TABLET, FILM COATED ORAL at 08:03

## 2018-03-25 RX ADMIN — FAMOTIDINE 20 MG: 20 TABLET, FILM COATED ORAL at 08:03

## 2018-03-25 RX ADMIN — HEPARIN SODIUM 5000 UNITS: 5000 INJECTION, SOLUTION INTRAVENOUS; SUBCUTANEOUS at 05:03

## 2018-03-25 RX ADMIN — EPLERENONE 50 MG: 25 TABLET ORAL at 08:03

## 2018-03-25 RX ADMIN — HYDRALAZINE HYDROCHLORIDE 10 MG: 20 INJECTION INTRAMUSCULAR; INTRAVENOUS at 10:03

## 2018-03-25 RX ADMIN — HYDRALAZINE HYDROCHLORIDE 10 MG: 20 INJECTION INTRAMUSCULAR; INTRAVENOUS at 12:03

## 2018-03-25 RX ADMIN — NIFEDIPINE 60 MG: 30 TABLET, FILM COATED, EXTENDED RELEASE ORAL at 08:03

## 2018-03-25 RX ADMIN — LEVOTHYROXINE SODIUM 75 MCG: 75 TABLET ORAL at 05:03

## 2018-03-25 RX ADMIN — LATANOPROST 1 DROP: 50 SOLUTION OPHTHALMIC at 08:03

## 2018-03-25 RX ADMIN — ROSUVASTATIN CALCIUM 10 MG: 10 TABLET, FILM COATED ORAL at 08:03

## 2018-03-25 NOTE — PROGRESS NOTES
Progress Note  Nephrology    Admit Date: 3/19/2018   LOS: 4 days     SUBJECTIVE:     Follow-up For:  Hyperaldostronism    Interval History:   Feels fine. C/O some min edema of ankles Bilat. Pt notable has not been out of bed today. Coreg started yesterday by primary team. BP remains uncontrolled.        OBJECTIVE:     Vital Signs Range  Temp:  [98 °F (36.7 °C)-98.9 °F (37.2 °C)]   Pulse:  [71-86]   Resp:  [16-18]   BP: (163-233)/()   SpO2:  [96 %-99 %]     I & O (Last 24H):  Intake/Output Summary (Last 24 hours) at 03/25/18 1425  Last data filed at 03/25/18 0500   Gross per 24 hour   Intake              480 ml   Output             1300 ml   Net             -820 ml       Physical Exam:  General appearance: Well developed, well nourished  Eyes:  Conjunctivae/corneas clear. PERRL.  Lungs: Normal respiratory effort,   clear to auscultation bilaterally   Heart: Regular rate and rhythm, S1, S2 normal, no murmur, rub or ruma.  Abdomen: Soft, non-tender non-distended; bowel sounds normal; no masses,  no organomegaly  Extremities: No cyanosis or clubbing. Trace edema BLE above ankle.    Skin: Skin color, texture, turgor normal. No rashes or lesions  Neurologic: Normal strength and tone. No focal numbness or weakness     Laboratory Data:  Reviewed and noted  where applicable- Please see chart for full lab data.    Medications:  Medication list was reviewed and changes noted under Assessment/Plan.    ASSESSMENT/PLAN:     Renal Progress Note    Admit Date: 3/19/2018   LOS: 4 days     SUBJECTIVE:     Patient is without new complaint.    Scheduled Meds:   aspirin  81 mg Oral Daily    carvedilol  6.25 mg Oral BID    citalopram  30 mg Oral Daily    eplerenone  50 mg Oral Daily    eplerenone  75 mg Oral QHS    famotidine  20 mg Oral Daily    heparin (porcine)  5,000 Units Subcutaneous Q8H    hydroxychloroquine  200 mg Oral BID    latanoprost  1 drop Both Eyes Daily    levothyroxine  75 mcg Oral Before breakfast     NIFEdipine  60 mg Oral Daily    rosuvastatin  10 mg Oral QHS       OBJECTIVE:     Vital Signs Range (Last 24H):  Temp:  [98 °F (36.7 °C)-98.9 °F (37.2 °C)]   Pulse:  [71-86]   Resp:  [16-18]   BP: (163-233)/()   SpO2:  [96 %-99 %]     I & O (Last 24H):    Intake/Output Summary (Last 24 hours) at 03/25/18 1425  Last data filed at 03/25/18 0500   Gross per 24 hour   Intake              480 ml   Output             1300 ml   Net             -820 ml       Physical Exam:  General appearance: Malnourished  Eyes:  Conjunctivae/corneas clear. PERRL.  Lungs: Normal respiratory effort,   clear to auscultation bilaterally   Heart: Regular rate and rhythm, S1, S2 normal, no murmur, rub or ruma.  Abdomen: Soft, non-tender non-distended; bowel sounds normal; no masses,  no organomegaly  Extremities: No cyanosis or clubbing. No edema.    Skin: Skin color, texture, turgor normal. No rashes or lesions  Neurologic: Normal strength and tone. No focal numbness or weaknes      Laboratory:  CBC:     Recent Labs  Lab 03/25/18  0617   WBC 4.19   RBC 3.30*   HGB 8.5*   HCT 26.5*      MCV 80*   MCH 25.8*   MCHC 32.1     BMP:     Recent Labs  Lab 03/25/18  0617   GLU 62*      K 4.9   *   CO2 19*   BUN 38*   CREATININE 1.5*   CALCIUM 8.6*   MG 1.6       ASSESSMENT/PLAN:     1. Resolved nonoliguric MARYAM on CKD IV (Baseline variable 1.8) likely due to multiple factors including dehydration, fluctuating BP, uni-nephric while on ARB (N17.9, N18.4, Z90.5, I70.1):  [place] [facility]  Recently started on inspra for hyperaldo and has been feeling poorly since with variable BP's at home.  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 Lea who feels no significant stenosis and angio not warranted at this time. In the absence of an absolute study such as CTA/ Angiogram we cannot say with 100%  "certainty, however the risk of these test with MARYAM is high and current evidence does not support this.   -Cr is now baseline  -Continue to hold ARB until she follows up with Dr. Epperson  -Slightly increase Eplerenone at 50 mg qam and 75 mg qpm and continue Nifedpine XL 60 mg. Although we need to watch BLE edema carefully as well may be limited on dose by this.   -It seem Coreg was also stated yesterday and will leave in place since it was started, but I would prefer that just one team adjust the BPs meds..  -I think that there had been too many changes in medications too quickly that resulted in intolerance.  -I would tolerate a higher than desired BP for now and allow adequate time for the current regimen to reach steady state and for the clonidine removal to no longer be adding any rebound component for the next two weeks prior to any further changes.  Hold ARB as above for now.  -I also told pt and nurse that I want her out of bed and lets not make any further changes except above for now.  -I also recommended that upon d/c that she f/u only with Dr. Epperson for now so as to avoid too many changes to her her medications.  2. 1o Hyperaldosteronism (E26.9): as above      3. Bradycardia (R00.1):  Pt was on high dose labetolol, and developed evelyn (HR 30-40s).  Held agents and HR improved  to 80's- 100's.   Coreg 6.25 mg BIDstarted and HR in 80s  4. Hyperuricemia (E79.0):  Recheck as outpt once euvolemic.  May need allopurinol.   5. SLE (M32.9):  Continue plaquenil.  Defer. Anti-histone negative.   6. Weight loss (R63.4): monitor for now.  After really discussing this issue in depth today it appears that she self restricted due to "low Na diet and didn't enjoy these foods so decided not to eat", hence to weight change.               "

## 2018-03-25 NOTE — PROGRESS NOTES
Dr. Flores notified of pt continued elevated bp from this morning and after morning meds and prn hydralazine.  No new orders given and will continue to monitor.

## 2018-03-25 NOTE — PROGRESS NOTES
[facility] Hospital Medicine  Progress Note    Patient Name: Ewelina Arnold  MRN: [id number]  Patient Class: IP- Inpatient   Admission Date: 3/19/2018  Length of Stay: 4 days  Attending Physician: Alie Flores*  Primary Care Provider: Kalie Walton MD        Subjective:     Principal Problem:Malignant hypertension    HPI:  This is a 72 y.o. Female, with history of HTN and CKD, who presents with complaint of fatigue that has been constant for approximately six days. Patient reports that six days ago she began taking Inspra to treat her HTN and stopped taking Torcemide. She reports that since this medication change she has been feeling fatigue accompanied by weakness, decreased appetite, nausea, and vomiting. These symptoms are similar to an episode three months ago for which she was admitted to [facility] for eleven days. During this hospital admission, patient was treated and symptoms had resolved. She was discharged on 1/26/2018. She reports during this admission she was diagnosed with hyperaldosteronism. Patient denies diarrhea, constipation, shortness of breath, or chest pain. Patient also denies use of alcohol, tobacco, or illicit drugs. She reports that her primary care provider is Dr. Snyder and her nephrologist is Dr. Diallo.     Hospital Course:  No notes on file    Interval History:   NAEON. Doing well but BP remains elevated this am. Coreg started yesterday (at the suggestion of the Nephrology team). Epleronone increased today . Anticipate d/c in AM if SBP<180.    Review of Systems   Constitutional: Negative for activity change, appetite change, chills, diaphoresis, fatigue, fever and unexpected weight change.   Eyes: Negative for photophobia and visual disturbance.   Respiratory: Negative for apnea, cough, choking, shortness of breath, wheezing and stridor.    Cardiovascular: Negative for chest pain, palpitations and leg swelling.   Gastrointestinal: Negative for  abdominal pain, blood in stool, constipation, diarrhea, nausea and vomiting.   Endocrine: Negative for cold intolerance and heat intolerance.   Genitourinary: Negative for difficulty urinating.   Skin: Negative for rash and wound.   Allergic/Immunologic: Negative for immunocompromised state.   Neurological: Negative for dizziness, weakness and headaches.     Objective:     Vital Signs (Most Recent):  Temp: 98.4 °F (36.9 °C) (03/25/18 1537)  Pulse: 88 (03/25/18 1800)  Resp: 18 (03/25/18 1537)  BP: (!) 196/84 (03/25/18 1537)  SpO2: 98 % (03/25/18 1537) Vital Signs (24h Range):  Temp:  [98 °F (36.7 °C)-98.9 °F (37.2 °C)] 98.4 °F (36.9 °C)  Pulse:  [71-88] 88  Resp:  [16-18] 18  SpO2:  [96 %-98 %] 98 %  BP: (170-233)/() 196/84     Weight: 46.3 kg (102 lb)  Body mass index is 18.66 kg/m².    Intake/Output Summary (Last 24 hours) at 03/25/18 1833  Last data filed at 03/25/18 0500   Gross per 24 hour   Intake              480 ml   Output             1300 ml   Net             -820 ml      Physical Exam   Constitutional: She is oriented to person, place, and time. She appears well-developed and well-nourished. No distress.   HENT:   Head: Normocephalic and atraumatic.   Mouth/Throat: No oropharyngeal exudate.   Eyes: Conjunctivae and EOM are normal. Pupils are equal, round, and reactive to light. No scleral icterus.   Neck: Normal range of motion. Neck supple. No tracheal deviation present. No thyromegaly present.   Cardiovascular: Normal rate, regular rhythm, normal heart sounds and intact distal pulses.    No murmur heard.  Pulmonary/Chest: Effort normal and breath sounds normal. No respiratory distress. She has no wheezes. She has no rales. She exhibits no tenderness.   Abdominal: Soft. Bowel sounds are normal. She exhibits no distension. There is no tenderness. There is no rebound and no guarding.   Musculoskeletal: Normal range of motion. She exhibits no edema or tenderness.   Lymphadenopathy:     She has no  cervical adenopathy.   Neurological: She is alert and oriented to person, place, and time.   Skin: Skin is warm and dry. No rash noted. She is not diaphoretic. No erythema. No pallor.   Psychiatric: She has a normal mood and affect. Her behavior is normal. Judgment and thought content normal.       Significant Labs: All pertinent labs within the past 24 hours have been reviewed.    Significant Imaging: I have reviewed all pertinent imaging results/findings within the past 24 hours.    Assessment/Plan:      * Malignant hypertension    BP remains significantly elevated.  Rx - Nifedipine 30 qday, increased to 60 3/23. Continuing eplerenone, dose increased today per Nephrology's recommendations. Holding ACEI. Coreg started 3/24.  Appreciate nephrology's assistance. Have recommending holding Labetalol.   Hydralazine PRN for SBP >180.           Hyperaldosteronism    eplerenone restarted  Pt seems to be tolerating        Chronic diastolic congestive heart failure    Euvolemic  Monitor weights        Bradycardia    Patient was given 600 mg of Labetalol and had her HR drop to 45.  Hold parameters assigned.  Nephrology assistance with titrating BP meds with current renal function  Asymptomatic and stable.          MARYAM on CKD 4    Hypertensive nephropathy and hypertensive emergency.   Cr improving daily. Continue to monitor.           Chronic kidney disease, stage IV (severe)    Creatinine 2.8, baseline between 2 and 3 for the last 5 months.  Referral placed with OP Nephrology f/u.              Nonrheumatic aortic valve stenosis              Solitary kidney, acquired              Hypertensive kidney disease with chronic kidney disease stage III    Monitor renal function  Renal dose meds  Avoid nephrotoxins          Mixed hyperlipidemia      Continue Crestor          Postsurgical hypothyroidism    Continue levothyroxine            VTE Risk Mitigation         Ordered     Place NADER hose  Until discontinued      03/23/18 5062      heparin (porcine) injection 5,000 Units  Every 8 hours     Route:  Subcutaneous        03/20/18 0052     Place sequential compression device  Until discontinued      03/19/18 2056              Alie Flores MD  Department of Hospital Medicine   [facility]

## 2018-03-25 NOTE — PROGRESS NOTES
Dr. Kaplan notified of elevated bp and states to not treat at this time.  Will continue to monitor.

## 2018-03-25 NOTE — ASSESSMENT & PLAN NOTE
BP remains significantly elevated.  Rx - Nifedipine 30 qday, increased to 60 3/23. Continuing eplerenone, dose increased today per Nephrology's recommendations. Holding ACEI. Coreg started 3/24.  Appreciate nephrology's assistance. Have recommending holding Labetalol.   Hydralazine PRN for SBP >180.

## 2018-03-25 NOTE — PLAN OF CARE
Problem: Patient Care Overview  Goal: Plan of Care Review  Outcome: Ongoing (interventions implemented as appropriate)  Patient free from fall and injuries. Vitals monitored PRN med given. Slept comfortably throughout the shift. No complaints.

## 2018-03-25 NOTE — SUBJECTIVE & OBJECTIVE
Interval History:   NAEON. Doing well but BP remains elevated this am. Coreg started yesterday (at the suggestion of the Nephrology team). Epleronone increased today . Anticipate d/c in AM if SBP<180.    Review of Systems   Constitutional: Negative for activity change, appetite change, chills, diaphoresis, fatigue, fever and unexpected weight change.   Eyes: Negative for photophobia and visual disturbance.   Respiratory: Negative for apnea, cough, choking, shortness of breath, wheezing and stridor.    Cardiovascular: Negative for chest pain, palpitations and leg swelling.   Gastrointestinal: Negative for abdominal pain, blood in stool, constipation, diarrhea, nausea and vomiting.   Endocrine: Negative for cold intolerance and heat intolerance.   Genitourinary: Negative for difficulty urinating.   Skin: Negative for rash and wound.   Allergic/Immunologic: Negative for immunocompromised state.   Neurological: Negative for dizziness, weakness and headaches.     Objective:     Vital Signs (Most Recent):  Temp: 98.4 °F (36.9 °C) (03/25/18 1537)  Pulse: 88 (03/25/18 1800)  Resp: 18 (03/25/18 1537)  BP: (!) 196/84 (03/25/18 1537)  SpO2: 98 % (03/25/18 1537) Vital Signs (24h Range):  Temp:  [98 °F (36.7 °C)-98.9 °F (37.2 °C)] 98.4 °F (36.9 °C)  Pulse:  [71-88] 88  Resp:  [16-18] 18  SpO2:  [96 %-98 %] 98 %  BP: (170-233)/() 196/84     Weight: 46.3 kg (102 lb)  Body mass index is 18.66 kg/m².    Intake/Output Summary (Last 24 hours) at 03/25/18 1833  Last data filed at 03/25/18 0500   Gross per 24 hour   Intake              480 ml   Output             1300 ml   Net             -820 ml      Physical Exam   Constitutional: She is oriented to person, place, and time. She appears well-developed and well-nourished. No distress.   HENT:   Head: Normocephalic and atraumatic.   Mouth/Throat: No oropharyngeal exudate.   Eyes: Conjunctivae and EOM are normal. Pupils are equal, round, and reactive to light. No scleral icterus.    Neck: Normal range of motion. Neck supple. No tracheal deviation present. No thyromegaly present.   Cardiovascular: Normal rate, regular rhythm, normal heart sounds and intact distal pulses.    No murmur heard.  Pulmonary/Chest: Effort normal and breath sounds normal. No respiratory distress. She has no wheezes. She has no rales. She exhibits no tenderness.   Abdominal: Soft. Bowel sounds are normal. She exhibits no distension. There is no tenderness. There is no rebound and no guarding.   Musculoskeletal: Normal range of motion. She exhibits no edema or tenderness.   Lymphadenopathy:     She has no cervical adenopathy.   Neurological: She is alert and oriented to person, place, and time.   Skin: Skin is warm and dry. No rash noted. She is not diaphoretic. No erythema. No pallor.   Psychiatric: She has a normal mood and affect. Her behavior is normal. Judgment and thought content normal.       Significant Labs: All pertinent labs within the past 24 hours have been reviewed.    Significant Imaging: I have reviewed all pertinent imaging results/findings within the past 24 hours.

## 2018-03-26 LAB
ALBUMIN SERPL BCP-MCNC: 2.7 G/DL
ALP SERPL-CCNC: 76 U/L
ALT SERPL W/O P-5'-P-CCNC: 25 U/L
ANION GAP SERPL CALC-SCNC: 6 MMOL/L
AST SERPL-CCNC: 42 U/L
BASOPHILS # BLD AUTO: 0.01 K/UL
BASOPHILS NFR BLD: 0.2 %
BILIRUB SERPL-MCNC: 0.2 MG/DL
BUN SERPL-MCNC: 37 MG/DL
CALCIUM SERPL-MCNC: 8.7 MG/DL
CHLORIDE SERPL-SCNC: 115 MMOL/L
CO2 SERPL-SCNC: 19 MMOL/L
CREAT SERPL-MCNC: 1.6 MG/DL
DIFFERENTIAL METHOD: ABNORMAL
EOSINOPHIL # BLD AUTO: 0 K/UL
EOSINOPHIL NFR BLD: 0.6 %
ERYTHROCYTE [DISTWIDTH] IN BLOOD BY AUTOMATED COUNT: 14.4 %
EST. GFR  (AFRICAN AMERICAN): 37 ML/MIN/1.73 M^2
EST. GFR  (NON AFRICAN AMERICAN): 32 ML/MIN/1.73 M^2
GLUCOSE SERPL-MCNC: 54 MG/DL
HCT VFR BLD AUTO: 26.9 %
HGB BLD-MCNC: 8.3 G/DL
LYMPHOCYTES # BLD AUTO: 2 K/UL
LYMPHOCYTES NFR BLD: 37.1 %
MAGNESIUM SERPL-MCNC: 1.6 MG/DL
MCH RBC QN AUTO: 25.1 PG
MCHC RBC AUTO-ENTMCNC: 30.9 G/DL
MCV RBC AUTO: 81 FL
MONOCYTES # BLD AUTO: 0.9 K/UL
MONOCYTES NFR BLD: 17.9 %
NEUTROPHILS # BLD AUTO: 2.3 K/UL
NEUTROPHILS NFR BLD: 44 %
PHOSPHATE SERPL-MCNC: 3.3 MG/DL
PLATELET # BLD AUTO: 229 K/UL
PMV BLD AUTO: 9.2 FL
POTASSIUM SERPL-SCNC: 5 MMOL/L
PROT SERPL-MCNC: 6.2 G/DL
RBC # BLD AUTO: 3.31 M/UL
SODIUM SERPL-SCNC: 140 MMOL/L
WBC # BLD AUTO: 5.26 K/UL

## 2018-03-26 PROCEDURE — 63600175 PHARM REV CODE 636 W HCPCS: Performed by: NURSE PRACTITIONER

## 2018-03-26 PROCEDURE — 85025 COMPLETE CBC W/AUTO DIFF WBC: CPT

## 2018-03-26 PROCEDURE — 25000003 PHARM REV CODE 250: Performed by: HOSPITALIST

## 2018-03-26 PROCEDURE — 25000003 PHARM REV CODE 250: Performed by: NURSE PRACTITIONER

## 2018-03-26 PROCEDURE — 83735 ASSAY OF MAGNESIUM: CPT

## 2018-03-26 PROCEDURE — 84100 ASSAY OF PHOSPHORUS: CPT

## 2018-03-26 PROCEDURE — 36415 COLL VENOUS BLD VENIPUNCTURE: CPT

## 2018-03-26 PROCEDURE — 80053 COMPREHEN METABOLIC PANEL: CPT

## 2018-03-26 PROCEDURE — 99232 SBSQ HOSP IP/OBS MODERATE 35: CPT | Mod: ,,, | Performed by: HOSPITALIST

## 2018-03-26 PROCEDURE — 11000001 HC ACUTE MED/SURG PRIVATE ROOM

## 2018-03-26 RX ORDER — HYDROCHLOROTHIAZIDE 25 MG/1
25 TABLET ORAL DAILY
Status: DISCONTINUED | OUTPATIENT
Start: 2018-03-26 | End: 2018-03-27

## 2018-03-26 RX ORDER — EPLERENONE 25 MG/1
75 TABLET, FILM COATED ORAL 2 TIMES DAILY
Status: DISCONTINUED | OUTPATIENT
Start: 2018-03-26 | End: 2018-03-27

## 2018-03-26 RX ADMIN — EPLERENONE 75 MG: 25 TABLET ORAL at 09:03

## 2018-03-26 RX ADMIN — HYDROXYCHLOROQUINE SULFATE 200 MG: 200 TABLET, FILM COATED ORAL at 09:03

## 2018-03-26 RX ADMIN — LEVOTHYROXINE SODIUM 75 MCG: 75 TABLET ORAL at 05:03

## 2018-03-26 RX ADMIN — NIFEDIPINE 60 MG: 30 TABLET, FILM COATED, EXTENDED RELEASE ORAL at 09:03

## 2018-03-26 RX ADMIN — HEPARIN SODIUM 5000 UNITS: 5000 INJECTION, SOLUTION INTRAVENOUS; SUBCUTANEOUS at 05:03

## 2018-03-26 RX ADMIN — ROSUVASTATIN CALCIUM 10 MG: 10 TABLET, FILM COATED ORAL at 09:03

## 2018-03-26 RX ADMIN — HYDRALAZINE HYDROCHLORIDE 10 MG: 20 INJECTION INTRAMUSCULAR; INTRAVENOUS at 11:03

## 2018-03-26 RX ADMIN — HEPARIN SODIUM 5000 UNITS: 5000 INJECTION, SOLUTION INTRAVENOUS; SUBCUTANEOUS at 09:03

## 2018-03-26 RX ADMIN — LATANOPROST 1 DROP: 50 SOLUTION OPHTHALMIC at 09:03

## 2018-03-26 RX ADMIN — FAMOTIDINE 20 MG: 20 TABLET, FILM COATED ORAL at 09:03

## 2018-03-26 RX ADMIN — CITALOPRAM HYDROBROMIDE 30 MG: 20 TABLET ORAL at 09:03

## 2018-03-26 RX ADMIN — CARVEDILOL 6.25 MG: 6.25 TABLET, FILM COATED ORAL at 09:03

## 2018-03-26 RX ADMIN — HEPARIN SODIUM 5000 UNITS: 5000 INJECTION, SOLUTION INTRAVENOUS; SUBCUTANEOUS at 01:03

## 2018-03-26 RX ADMIN — HYDROCHLOROTHIAZIDE 25 MG: 25 TABLET ORAL at 11:03

## 2018-03-26 RX ADMIN — ASPIRIN 81 MG: 81 TABLET, COATED ORAL at 09:03

## 2018-03-26 RX ADMIN — HYDRALAZINE HYDROCHLORIDE 10 MG: 20 INJECTION INTRAMUSCULAR; INTRAVENOUS at 01:03

## 2018-03-26 RX ADMIN — ACETAMINOPHEN 650 MG: 325 TABLET ORAL at 04:03

## 2018-03-26 NOTE — ASSESSMENT & PLAN NOTE
Hypertensive nephropathy and hypertensive emergency.   Cr improving daily, 1.6 today. Continue to monitor.

## 2018-03-26 NOTE — PROGRESS NOTES
[facility] Hospital Medicine  Progress Note    Patient Name: Ewelina Arnold  MRN: [id number]  Patient Class: IP- Inpatient   Admission Date: 3/19/2018  Length of Stay: 5 days  Attending Physician: Alie Flores*  Primary Care Provider: Kalie Walton MD        Subjective:     Principal Problem:Malignant hypertension    HPI:  This is a 72 y.o. Female, with history of HTN and CKD, who presents with complaint of fatigue that has been constant for approximately six days. Patient reports that six days ago she began taking Inspra to treat her HTN and stopped taking Torcemide. She reports that since this medication change she has been feeling fatigue accompanied by weakness, decreased appetite, nausea, and vomiting. These symptoms are similar to an episode three months ago for which she was admitted to [facility] for eleven days. During this hospital admission, patient was treated and symptoms had resolved. She was discharged on 1/26/2018. She reports during this admission she was diagnosed with hyperaldosteronism. Patient denies diarrhea, constipation, shortness of breath, or chest pain. Patient also denies use of alcohol, tobacco, or illicit drugs. She reports that her primary care provider is Dr. Snyder and her nephrologist is Dr. Diallo.     Hospital Course:  No notes on file    Interval History:   NAEON. Doing well but BP remains elevated this am. No complaints, remains asymptomatic.    Review of Systems   Constitutional: Negative for activity change, appetite change, chills, diaphoresis, fatigue, fever and unexpected weight change.   Eyes: Negative for photophobia and visual disturbance.   Respiratory: Negative for apnea, cough, choking, shortness of breath, wheezing and stridor.    Cardiovascular: Negative for chest pain, palpitations and leg swelling.   Gastrointestinal: Negative for abdominal pain, blood in stool, constipation, diarrhea, nausea and vomiting.   Endocrine:  Negative for cold intolerance and heat intolerance.   Genitourinary: Negative for difficulty urinating.   Skin: Negative for rash and wound.   Allergic/Immunologic: Negative for immunocompromised state.   Neurological: Negative for dizziness, weakness and headaches.     Objective:     Vital Signs (Most Recent):  Temp: 98.2 °F (36.8 °C) (03/26/18 0734)  Pulse: 89 (03/26/18 0800)  Resp: 18 (03/26/18 0734)  BP: (!) 219/102 (03/26/18 0734)  SpO2: 97 % (03/26/18 0734) Vital Signs (24h Range):  Temp:  [98 °F (36.7 °C)-99.4 °F (37.4 °C)] 98.2 °F (36.8 °C)  Pulse:  [71-94] 89  Resp:  [16-18] 18  SpO2:  [97 %-99 %] 97 %  BP: (180-219)/() 219/102     Weight: 46.3 kg (102 lb)  Body mass index is 18.66 kg/m².    Intake/Output Summary (Last 24 hours) at 03/26/18 0854  Last data filed at 03/26/18 0602   Gross per 24 hour   Intake                0 ml   Output              600 ml   Net             -600 ml      Physical Exam   Constitutional: She is oriented to person, place, and time. She appears well-developed and well-nourished. No distress.   HENT:   Head: Normocephalic and atraumatic.   Mouth/Throat: No oropharyngeal exudate.   Eyes: Conjunctivae and EOM are normal. Pupils are equal, round, and reactive to light. No scleral icterus.   Neck: Normal range of motion. Neck supple. No tracheal deviation present. No thyromegaly present.   Cardiovascular: Normal rate, regular rhythm, normal heart sounds and intact distal pulses.    No murmur heard.  Pulmonary/Chest: Effort normal and breath sounds normal. No respiratory distress. She has no wheezes. She has no rales. She exhibits no tenderness.   Abdominal: Soft. Bowel sounds are normal. She exhibits no distension. There is no tenderness. There is no rebound and no guarding.   Musculoskeletal: Normal range of motion. She exhibits no edema or tenderness.   Lymphadenopathy:     She has no cervical adenopathy.   Neurological: She is alert and oriented to person, place, and time.    Skin: Skin is warm and dry. No rash noted. She is not diaphoretic. No erythema. No pallor.   Psychiatric: She has a normal mood and affect. Her behavior is normal. Judgment and thought content normal.       Significant Labs: All pertinent labs within the past 24 hours have been reviewed.    Significant Imaging: I have reviewed all pertinent imaging results/findings within the past 24 hours.    Assessment/Plan:      * Malignant hypertension    BP remains significantly elevated.  Rx - Nifedipine 30 qday, increased to 60 3/23. Continuing eplerenone, dose increased 3/25 per Nephrology's recommendations. Holding ACEI. Coreg started 3/24, per nephrology's recommendations.  Defer further changes to nephrology, per their request.   Hydralazine PRN for SBP >180.           Hyperaldosteronism    eplerenone restarted  Pt seems to be tolerating        Chronic diastolic congestive heart failure    Euvolemic  Monitor weights        Bradycardia    Patient was given 600 mg of Labetalol and had her HR drop to 45.  Hold parameters assigned.  Nephrology assistance with titrating BP meds with current renal function  Asymptomatic and stable.          MARYAM on CKD 4    Hypertensive nephropathy and hypertensive emergency.   Cr improving daily, 1.6 today. Continue to monitor.           Chronic kidney disease, stage IV (severe)    Creatinine 2.8, baseline between 2 and 3 for the last 5 months.  Referral placed with OP Nephrology f/u.              Nonrheumatic aortic valve stenosis              Solitary kidney, acquired              Hypertensive kidney disease with chronic kidney disease stage III    Monitor renal function  Renal dose meds  Avoid nephrotoxins          Mixed hyperlipidemia      Continue Crestor          Postsurgical hypothyroidism    Continue levothyroxine            VTE Risk Mitigation         Ordered     Place NADER hose  Until discontinued      03/23/18 0858     heparin (porcine) injection 5,000 Units  Every 8 hours      Route:  Subcutaneous        03/20/18 0052     Place sequential compression device  Until discontinued      03/19/18 2056              Alie Flores MD  Department of Hospital Medicine   [facility]

## 2018-03-26 NOTE — SUBJECTIVE & OBJECTIVE
Interval History:   NAEON. Doing well but BP remains elevated this am. No complaints, remains asymptomatic.    Review of Systems   Constitutional: Negative for activity change, appetite change, chills, diaphoresis, fatigue, fever and unexpected weight change.   Eyes: Negative for photophobia and visual disturbance.   Respiratory: Negative for apnea, cough, choking, shortness of breath, wheezing and stridor.    Cardiovascular: Negative for chest pain, palpitations and leg swelling.   Gastrointestinal: Negative for abdominal pain, blood in stool, constipation, diarrhea, nausea and vomiting.   Endocrine: Negative for cold intolerance and heat intolerance.   Genitourinary: Negative for difficulty urinating.   Skin: Negative for rash and wound.   Allergic/Immunologic: Negative for immunocompromised state.   Neurological: Negative for dizziness, weakness and headaches.     Objective:     Vital Signs (Most Recent):  Temp: 98.2 °F (36.8 °C) (03/26/18 0734)  Pulse: 89 (03/26/18 0800)  Resp: 18 (03/26/18 0734)  BP: (!) 219/102 (03/26/18 0734)  SpO2: 97 % (03/26/18 0734) Vital Signs (24h Range):  Temp:  [98 °F (36.7 °C)-99.4 °F (37.4 °C)] 98.2 °F (36.8 °C)  Pulse:  [71-94] 89  Resp:  [16-18] 18  SpO2:  [97 %-99 %] 97 %  BP: (180-219)/() 219/102     Weight: 46.3 kg (102 lb)  Body mass index is 18.66 kg/m².    Intake/Output Summary (Last 24 hours) at 03/26/18 0854  Last data filed at 03/26/18 0602   Gross per 24 hour   Intake                0 ml   Output              600 ml   Net             -600 ml      Physical Exam   Constitutional: She is oriented to person, place, and time. She appears well-developed and well-nourished. No distress.   HENT:   Head: Normocephalic and atraumatic.   Mouth/Throat: No oropharyngeal exudate.   Eyes: Conjunctivae and EOM are normal. Pupils are equal, round, and reactive to light. No scleral icterus.   Neck: Normal range of motion. Neck supple. No tracheal deviation present. No thyromegaly  present.   Cardiovascular: Normal rate, regular rhythm, normal heart sounds and intact distal pulses.    No murmur heard.  Pulmonary/Chest: Effort normal and breath sounds normal. No respiratory distress. She has no wheezes. She has no rales. She exhibits no tenderness.   Abdominal: Soft. Bowel sounds are normal. She exhibits no distension. There is no tenderness. There is no rebound and no guarding.   Musculoskeletal: Normal range of motion. She exhibits no edema or tenderness.   Lymphadenopathy:     She has no cervical adenopathy.   Neurological: She is alert and oriented to person, place, and time.   Skin: Skin is warm and dry. No rash noted. She is not diaphoretic. No erythema. No pallor.   Psychiatric: She has a normal mood and affect. Her behavior is normal. Judgment and thought content normal.       Significant Labs: All pertinent labs within the past 24 hours have been reviewed.    Significant Imaging: I have reviewed all pertinent imaging results/findings within the past 24 hours.

## 2018-03-26 NOTE — ASSESSMENT & PLAN NOTE
BP remains significantly elevated.  Rx - Nifedipine 30 qday, increased to 60 3/23. Continuing eplerenone, dose increased 3/25 per Nephrology's recommendations. Holding ACEI. Coreg started 3/24, per nephrology's recommendations.  Defer further changes to nephrology, per their request.   Hydralazine PRN for SBP >180.

## 2018-03-26 NOTE — PLAN OF CARE
03/26/18 1326   Medicare Message   Important Message from Medicare regarding Discharge Appeal Rights Given to patient/caregiver;Explained to patient/caregiver;Signed/date by patient/caregiver   Date IMM was signed 03/26/18   Time IMM was signed 0700

## 2018-03-26 NOTE — PROGRESS NOTES
"Progress Note  Nephrology    Admit Date: 3/19/2018   LOS: 5 days     SUBJECTIVE:     Follow-up For:  Hyperaldostronism    Interval History:   Upset about BP again.  Extensive discussion with pt about slow adjustments in BP meds to monitor solitary kidney.  No CP/SOB.  "My ankles are swollen".          OBJECTIVE:     Vital Signs Range  Temp:  [98 °F (36.7 °C)-99.4 °F (37.4 °C)]   Pulse:  [78-94]   Resp:  [16-18]   BP: (180-219)/()   SpO2:  [97 %-99 %]     I & O (Last 24H):    Intake/Output Summary (Last 24 hours) at 03/26/18 1113  Last data filed at 03/26/18 0602   Gross per 24 hour   Intake                0 ml   Output              600 ml   Net             -600 ml       Physical Exam:  General appearance: Well developed, well nourished  Eyes:  Conjunctivae/corneas clear. PERRL.  Lungs: Normal respiratory effort,   clear to auscultation bilaterally   Heart: Regular rate and rhythm, S1, S2 normal, no murmur, rub or ruma.  Abdomen: Soft, non-tender non-distended; bowel sounds normal; no masses,  no organomegaly  Extremities: No cyanosis or clubbing. Trace edema BLE above ankle.    Skin: Skin color, texture, turgor normal. No rashes or lesions  Neurologic: Normal strength and tone. No focal numbness or weakness     Laboratory Data:  Reviewed and noted  where applicable- Please see chart for full lab data.    Medications:  Medication list was reviewed and changes noted under Assessment/Plan.      ASSESSMENT/PLAN:     1. Resolved nonoliguric MARYAM on CKD IV (Baseline variable 1.8) likely due to multiple factors including dehydration, fluctuating BP, uni-nephric while on ARB (N17.9, N18.4, Z90.5, I70.1):  Followed by Dr. Epperson at [facility].  Recently started on inspra for hyperaldo and has been feeling poorly since with variable BP's at home.  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 Lea who feels " no significant stenosis and angio not warranted at this time. In the absence of an absolute study such as CTA/ Angiogram we cannot say with 100% certainty, however the risk of these test with MARYAM is high and current evidence does not support this.   -Cr is now baseline  -Continue to hold ARB until she follows up with Dr. Epperson  -increase Eplerenone 75 mg BID and continue Nifedpine XL 60 mg. Although we need to watch BLE edema carefully as well may be limited on dose by this.   -low dose HCTZ  -Coreg was also started.  -I think that there have been too many changes in medications too quickly that resulted in intolerance.  -I would tolerate a higher than desired BP for now   -hydralazine PRN  -I also recommended that upon d/c that she f/u only with Dr. Epperson for now so as to avoid too many changes to her her medications.  2. 1o Hyperaldosteronism (E26.9): as above      3. Bradycardia (R00.1):  Pt was on high dose labetolol, and developed evelyn (HR 30-40s).  Held agents and HR improved  to 80's- 100's.   Coreg 6.25 mg BIDstarted and HR in 80s  4. Hyperuricemia (E79.0):  Recheck as outpt once euvolemic.  May need allopurinol.   5. SLE (M32.9):  Continue plaquenil.  Defer. Anti-histone negative.       See above

## 2018-03-26 NOTE — PROGRESS NOTES
Patient provided with education regarding hypertension.  Patient states she does not drink caffeine, alcohol, or smoke.  Patient stated she takes her medications as prescribed.  She stated she was attending group exercise therapy 3 days a week but due to feeling sick she has not been able to attend.  Patient states she limits salt.  Patient provided with educational handouts.

## 2018-03-26 NOTE — PLAN OF CARE
Problem: Patient Care Overview  Goal: Plan of Care Review  Outcome: Ongoing (interventions implemented as appropriate)  Patient free fram fall and injuries. VS continously monitored. PRN med given. On tele monitor. NSR rhythm. Patient sleeping comfortably. Seen from time to time.

## 2018-03-27 ENCOUNTER — TELEPHONE (OUTPATIENT)
Dept: ENDOCRINOLOGY | Facility: CLINIC | Age: 73
End: 2018-03-27

## 2018-03-27 DIAGNOSIS — E26.9 HYPERALDOSTERONISM: Primary | ICD-10-CM

## 2018-03-27 PROBLEM — I16.9 HYPERTENSIVE CRISIS: Status: ACTIVE | Noted: 2018-03-19

## 2018-03-27 PROBLEM — N18.9 ACUTE KIDNEY INJURY SUPERIMPOSED ON CHRONIC KIDNEY DISEASE: Status: ACTIVE | Noted: 2018-01-21

## 2018-03-27 PROBLEM — I15.2 HYPERTENSION DUE TO ENDOCRINE DISORDER: Status: ACTIVE | Noted: 2018-03-19

## 2018-03-27 LAB
ALBUMIN SERPL BCP-MCNC: 2.7 G/DL
ALP SERPL-CCNC: 74 U/L
ALT SERPL W/O P-5'-P-CCNC: 26 U/L
ANION GAP SERPL CALC-SCNC: 7 MMOL/L
AST SERPL-CCNC: 42 U/L
BASOPHILS # BLD AUTO: 0.02 K/UL
BASOPHILS NFR BLD: 0.5 %
BILIRUB SERPL-MCNC: 0.2 MG/DL
BUN SERPL-MCNC: 38 MG/DL
CALCIUM SERPL-MCNC: 8.6 MG/DL
CHLORIDE SERPL-SCNC: 113 MMOL/L
CO2 SERPL-SCNC: 19 MMOL/L
CREAT SERPL-MCNC: 1.6 MG/DL
DIFFERENTIAL METHOD: ABNORMAL
EOSINOPHIL # BLD AUTO: 0 K/UL
EOSINOPHIL NFR BLD: 0 %
ERYTHROCYTE [DISTWIDTH] IN BLOOD BY AUTOMATED COUNT: 14.4 %
EST. GFR  (AFRICAN AMERICAN): 37 ML/MIN/1.73 M^2
EST. GFR  (NON AFRICAN AMERICAN): 32 ML/MIN/1.73 M^2
GLUCOSE SERPL-MCNC: 57 MG/DL
HCT VFR BLD AUTO: 26.3 %
HGB BLD-MCNC: 8.3 G/DL
LYMPHOCYTES # BLD AUTO: 1.7 K/UL
LYMPHOCYTES NFR BLD: 37.9 %
MAGNESIUM SERPL-MCNC: 1.5 MG/DL
MCH RBC QN AUTO: 25.5 PG
MCHC RBC AUTO-ENTMCNC: 31.6 G/DL
MCV RBC AUTO: 81 FL
MONOCYTES # BLD AUTO: 0.9 K/UL
MONOCYTES NFR BLD: 19.9 %
NEUTROPHILS # BLD AUTO: 1.9 K/UL
NEUTROPHILS NFR BLD: 41.7 %
PHOSPHATE SERPL-MCNC: 3.9 MG/DL
PLATELET # BLD AUTO: 244 K/UL
PMV BLD AUTO: 9.7 FL
POTASSIUM SERPL-SCNC: 4.9 MMOL/L
PROT SERPL-MCNC: 6.1 G/DL
RBC # BLD AUTO: 3.25 M/UL
SODIUM SERPL-SCNC: 139 MMOL/L
WBC # BLD AUTO: 4.43 K/UL

## 2018-03-27 PROCEDURE — 11000001 HC ACUTE MED/SURG PRIVATE ROOM

## 2018-03-27 PROCEDURE — 25000003 PHARM REV CODE 250: Performed by: HOSPITALIST

## 2018-03-27 PROCEDURE — 25000003 PHARM REV CODE 250: Performed by: NURSE PRACTITIONER

## 2018-03-27 PROCEDURE — 36415 COLL VENOUS BLD VENIPUNCTURE: CPT

## 2018-03-27 PROCEDURE — 63600175 PHARM REV CODE 636 W HCPCS: Performed by: NURSE PRACTITIONER

## 2018-03-27 PROCEDURE — 84100 ASSAY OF PHOSPHORUS: CPT

## 2018-03-27 PROCEDURE — 85025 COMPLETE CBC W/AUTO DIFF WBC: CPT

## 2018-03-27 PROCEDURE — 99233 SBSQ HOSP IP/OBS HIGH 50: CPT | Mod: ,,, | Performed by: HOSPITALIST

## 2018-03-27 PROCEDURE — 80053 COMPREHEN METABOLIC PANEL: CPT

## 2018-03-27 PROCEDURE — 83735 ASSAY OF MAGNESIUM: CPT

## 2018-03-27 RX ORDER — CARVEDILOL 12.5 MG/1
12.5 TABLET ORAL 2 TIMES DAILY
Status: DISCONTINUED | OUTPATIENT
Start: 2018-03-27 | End: 2018-03-28

## 2018-03-27 RX ORDER — LATANOPROST 50 UG/ML
1 SOLUTION/ DROPS OPHTHALMIC NIGHTLY
Status: DISCONTINUED | OUTPATIENT
Start: 2018-03-27 | End: 2018-03-28 | Stop reason: HOSPADM

## 2018-03-27 RX ORDER — SPIRONOLACTONE 25 MG/1
100 TABLET ORAL 2 TIMES DAILY
Status: DISCONTINUED | OUTPATIENT
Start: 2018-03-27 | End: 2018-03-28

## 2018-03-27 RX ORDER — SPIRONOLACTONE 25 MG/1
100 TABLET ORAL DAILY
Status: DISCONTINUED | OUTPATIENT
Start: 2018-03-27 | End: 2018-03-27

## 2018-03-27 RX ORDER — SPIRONOLACTONE 25 MG/1
50 TABLET ORAL DAILY
Status: DISCONTINUED | OUTPATIENT
Start: 2018-03-27 | End: 2018-03-27

## 2018-03-27 RX ADMIN — ASPIRIN 81 MG: 81 TABLET, COATED ORAL at 08:03

## 2018-03-27 RX ADMIN — LATANOPROST 1 DROP: 50 SOLUTION/ DROPS OPHTHALMIC at 07:03

## 2018-03-27 RX ADMIN — FAMOTIDINE 20 MG: 20 TABLET, FILM COATED ORAL at 08:03

## 2018-03-27 RX ADMIN — HEPARIN SODIUM 5000 UNITS: 5000 INJECTION, SOLUTION INTRAVENOUS; SUBCUTANEOUS at 07:03

## 2018-03-27 RX ADMIN — CITALOPRAM HYDROBROMIDE 30 MG: 20 TABLET ORAL at 08:03

## 2018-03-27 RX ADMIN — HEPARIN SODIUM 5000 UNITS: 5000 INJECTION, SOLUTION INTRAVENOUS; SUBCUTANEOUS at 05:03

## 2018-03-27 RX ADMIN — CARVEDILOL 12.5 MG: 12.5 TABLET, FILM COATED ORAL at 07:03

## 2018-03-27 RX ADMIN — HYDROXYCHLOROQUINE SULFATE 200 MG: 200 TABLET, FILM COATED ORAL at 08:03

## 2018-03-27 RX ADMIN — SPIRONOLACTONE 100 MG: 25 TABLET, FILM COATED ORAL at 10:03

## 2018-03-27 RX ADMIN — CARVEDILOL 6.25 MG: 6.25 TABLET, FILM COATED ORAL at 08:03

## 2018-03-27 RX ADMIN — HYDRALAZINE HYDROCHLORIDE 10 MG: 20 INJECTION INTRAMUSCULAR; INTRAVENOUS at 05:03

## 2018-03-27 RX ADMIN — NIFEDIPINE 60 MG: 30 TABLET, FILM COATED, EXTENDED RELEASE ORAL at 08:03

## 2018-03-27 RX ADMIN — ROSUVASTATIN CALCIUM 10 MG: 10 TABLET, FILM COATED ORAL at 07:03

## 2018-03-27 RX ADMIN — EPLERENONE 75 MG: 25 TABLET ORAL at 08:03

## 2018-03-27 RX ADMIN — HYDROCHLOROTHIAZIDE 25 MG: 25 TABLET ORAL at 08:03

## 2018-03-27 RX ADMIN — ACETAMINOPHEN 650 MG: 325 TABLET ORAL at 02:03

## 2018-03-27 RX ADMIN — HYDROXYCHLOROQUINE SULFATE 200 MG: 200 TABLET, FILM COATED ORAL at 07:03

## 2018-03-27 RX ADMIN — HEPARIN SODIUM 5000 UNITS: 5000 INJECTION, SOLUTION INTRAVENOUS; SUBCUTANEOUS at 03:03

## 2018-03-27 RX ADMIN — LEVOTHYROXINE SODIUM 75 MCG: 75 TABLET ORAL at 05:03

## 2018-03-27 RX ADMIN — SPIRONOLACTONE 100 MG: 25 TABLET, FILM COATED ORAL at 07:03

## 2018-03-27 NOTE — TELEPHONE ENCOUNTER
Hyperaldosteronism  Started on eplerenone by Dr. Epperson due to intolerability to spironolactone.   I agree with this.     PLAN:  Slowly start eplerenone and increase to very slowly   Expect creat to worsen a little bit but this is expected with treatment.   Would like to repeat renin level in three to four month as it should slowly start to rise.

## 2018-03-27 NOTE — PLAN OF CARE
CM met with pt to discuss discharge planning. Pt continues to state she will have no CM needs for discharge. Pt has been ambulating and hopes to discharge soon.  CM to follow.     03/27/18 1420   Discharge Assessment   Assessment Type Discharge Planning Reassessment   Confirmed/corrected address and phone number on facesheet? Yes   Assessment information obtained from? Patient;Medical Record   Communicated expected length of stay with patient/caregiver yes   Prior to hospitilization cognitive status: Alert/Oriented   Prior to hospitalization functional status: Independent   Current cognitive status: Alert/Oriented   Lives With alone   Able to Return to Prior Arrangements yes   Patient's perception of discharge disposition home or selfcare   Readmission Within The Last 30 Days no previous admission in last 30 days   Patient currently being followed by outpatient case management? No   Patient currently receives any other outside agency services? No   Equipment Currently Used at Home none   Do you have any problems affording any of your prescribed medications? No   Is the patient taking medications as prescribed? yes   Does the patient have transportation home? Yes   Transportation Available family or friend will provide   Does the patient receive services at the [facility]? No   Discharge Plan A Home   Discharge Plan B Home   Patient/Family In Agreement With Plan yes

## 2018-03-27 NOTE — SUBJECTIVE & OBJECTIVE
Interval History:  No acute events overnight.    Review of Systems   Constitutional: Negative for chills and fever.   Respiratory: Negative for shortness of breath and wheezing.    Cardiovascular: Negative for chest pain.   Gastrointestinal: Negative for abdominal distention, abdominal pain, constipation, diarrhea, nausea and vomiting.   Genitourinary: Negative for dysuria and frequency.   Musculoskeletal: Negative for arthralgias and myalgias.   Neurological: Negative for light-headedness.   Psychiatric/Behavioral: Negative for agitation and confusion.     Objective:     Vital Signs (Most Recent):  Temp: 98.1 °F (36.7 °C) (03/27/18 0839)  Pulse: 78 (03/27/18 1400)  Resp: 18 (03/27/18 0839)  BP: (!) 220/118 (03/27/18 0839)  SpO2: 97 % (03/27/18 0839) Vital Signs (24h Range):  Temp:  [98.1 °F (36.7 °C)-98.7 °F (37.1 °C)] 98.1 °F (36.7 °C)  Pulse:  [74-96] 78  Resp:  [16-18] 18  SpO2:  [97 %-99 %] 97 %  BP: (176-220)/() 220/118     Weight: 46.3 kg (102 lb)  Body mass index is 18.66 kg/m².    Intake/Output Summary (Last 24 hours) at 03/27/18 1455  Last data filed at 03/27/18 0600   Gross per 24 hour   Intake              200 ml   Output              700 ml   Net             -500 ml      Physical Exam   Constitutional: She is oriented to person, place, and time. She appears well-developed and well-nourished. No distress.   HENT:   Head: Atraumatic.   Eyes: Conjunctivae are normal.   Neck: Neck supple.   Cardiovascular: Normal rate, regular rhythm and normal heart sounds.    No murmur heard.  Pulmonary/Chest: Effort normal and breath sounds normal. She has no wheezes.   Abdominal: Soft. Bowel sounds are normal. She exhibits no distension. There is no tenderness.   Musculoskeletal: Normal range of motion. She exhibits no edema or deformity.   Neurological: She is alert and oriented to person, place, and time.       Significant Labs: All pertinent labs within the past 24 hours have been reviewed.    Significant  Imaging: I have reviewed all pertinent imaging results/findings within the past 24 hours.

## 2018-03-27 NOTE — PLAN OF CARE
Problem: Patient Care Overview  Goal: Plan of Care Review  Outcome: Ongoing (interventions implemented as appropriate)  Patient resting in bed at this time, bed in lowest and locked position, night light on, call light and all other important items within reach, purposeful rounding, bed alarm used as needed, no injuries reported, non skid foot wear, instructed to call for help as needed     RA, tolerating  Tele reading SR  Saline locked  C/o headache with moderate to full relief with prn pain meds   BP 170s/80s during shift, hydralazine prn not needed at this time      197/82 at 0400 am vital check, hydralazine given     No other complaints or concerns at this time  Will cont to monitor

## 2018-03-27 NOTE — HOSPITAL COURSE
Patient is a 72 year-old woman with poorly controlled hypertension, chronic kidney disease stage IV (baseline serum creatinine of 1.8 mg/dL), primary hyperaldosteronism who was admitted to the hospital for treatment of acute renal failure with hypertensive urgency.  Blood pressure regimen adjusted to achieve reasonable blood pressure control along with stable kidney function.  Patient reports that should would like to avoid further care at [facility] and therefore would like to switch her nephrology care to [facility] with Dr. Gustabo Brizuela.  Patient discharged home and advised to follow-up with Dr. Brizuela in about 1 weeks time.

## 2018-03-27 NOTE — PROGRESS NOTES
[facility] Hospital Medicine  Progress Note    Patient Name: Ewelina Arnold  MRN: [id number]  Patient Class: IP- Inpatient   Admission Date: 3/19/2018  Length of Stay: 6 days  Attending Physician: Louis Vergara MD  Primary Care Provider: Kalie Walton MD        Subjective:     Principal Problem:Acute kidney injury superimposed on chronic kidney disease    HPI:  Patient is a 72 year-old woman with poorly controlled hypertension, chronic kidney disease stage IV (baseline serum creatinine of 1.8 mg/dL), primary hyperaldosteronism who presented to the emergency department with fatigue, nausea, and vomiting which she reports started soon after starting eplerenone.  Laboratory studies revealed evidence of acute kidney injury and patient was admitted to the hospital for treatment.    Hospital Course:  Patient is a 72 year-old woman with poorly controlled hypertension, chronic kidney disease stage IV (baseline serum creatinine of 1.8 mg/dL), primary hyperaldosteronism who was admitted to the hospital for treatment of acute renal failure with hypertensive urgency.    Interval History:  No acute events overnight.    Review of Systems   Constitutional: Negative for chills and fever.   Respiratory: Negative for shortness of breath and wheezing.    Cardiovascular: Negative for chest pain.   Gastrointestinal: Negative for abdominal distention, abdominal pain, constipation, diarrhea, nausea and vomiting.   Genitourinary: Negative for dysuria and frequency.   Musculoskeletal: Negative for arthralgias and myalgias.   Neurological: Negative for light-headedness.   Psychiatric/Behavioral: Negative for agitation and confusion.     Objective:     Vital Signs (Most Recent):  Temp: 98.1 °F (36.7 °C) (03/27/18 0839)  Pulse: 78 (03/27/18 1400)  Resp: 18 (03/27/18 0839)  BP: (!) 220/118 (03/27/18 0839)  SpO2: 97 % (03/27/18 0839) Vital Signs (24h Range):  Temp:  [98.1 °F (36.7 °C)-98.7 °F (37.1 °C)] 98.1 °F  (36.7 °C)  Pulse:  [74-96] 78  Resp:  [16-18] 18  SpO2:  [97 %-99 %] 97 %  BP: (176-220)/() 220/118     Weight: 46.3 kg (102 lb)  Body mass index is 18.66 kg/m².    Intake/Output Summary (Last 24 hours) at 03/27/18 1455  Last data filed at 03/27/18 0600   Gross per 24 hour   Intake              200 ml   Output              700 ml   Net             -500 ml      Physical Exam   Constitutional: She is oriented to person, place, and time. She appears well-developed and well-nourished. No distress.   HENT:   Head: Atraumatic.   Eyes: Conjunctivae are normal.   Neck: Neck supple.   Cardiovascular: Normal rate, regular rhythm and normal heart sounds.    No murmur heard.  Pulmonary/Chest: Effort normal and breath sounds normal. She has no wheezes.   Abdominal: Soft. Bowel sounds are normal. She exhibits no distension. There is no tenderness.   Musculoskeletal: Normal range of motion. She exhibits no edema or deformity.   Neurological: She is alert and oriented to person, place, and time.       Significant Labs: All pertinent labs within the past 24 hours have been reviewed.    Significant Imaging: I have reviewed all pertinent imaging results/findings within the past 24 hours.    Assessment/Plan:      * Acute kidney injury superimposed on chronic kidney disease stage IV    Secondary to intolerance of medications and dehydration.  Serum creatinine back to prior baseline.  Continue to slowly monitor kidney function as blood pressure medications adjusted to achieve reasonable blood pressure control.        Hypertensive crisis    Poorly controlled.  Adjust blood pressure medications further as per nephrology.          Hyperaldosteronism    Eplerenone discontinued and now on spironolactone.  Will continue.        Chronic diastolic congestive heart failure    Euvolemic.  Stable.  Continue with medical therapy.        Hypothyroidism    Continue levothyroxine.        Dyslipidemia    Continue with statin therapy.             Solitary kidney, acquired                VTE Risk Mitigation         Ordered     Place NADER hose  Until discontinued      03/23/18 0858     heparin (porcine) injection 5,000 Units  Every 8 hours     Route:  Subcutaneous        03/20/18 0052     Place sequential compression device  Until discontinued      03/19/18 2056              Louis Vergara MD  Department of Hospital Medicine   [facility]

## 2018-03-27 NOTE — ASSESSMENT & PLAN NOTE
Secondary to intolerance of medications and dehydration.  Serum creatinine back to prior baseline.  Continue to slowly monitor kidney function as blood pressure medications adjusted to achieve reasonable blood pressure control.

## 2018-03-27 NOTE — PROGRESS NOTES
Progress Note  Nephrology    Admit Date: 3/19/2018   LOS: 6 days     SUBJECTIVE:     Follow-up For:  Hyperaldostronism    Interval History:   Uneventful night.  BP up again.  Discussed with attending.  No CP/SOB.       OBJECTIVE:     Vital Signs Range  Temp:  [98.1 °F (36.7 °C)-99 °F (37.2 °C)]   Pulse:  [75-96]   Resp:  [16-18]   BP: (176-220)/()   SpO2:  [97 %-99 %]     I & O (Last 24H):    Intake/Output Summary (Last 24 hours) at 03/27/18 0957  Last data filed at 03/27/18 0600   Gross per 24 hour   Intake              200 ml   Output              900 ml   Net             -700 ml       Physical Exam:  General appearance: Well developed, well nourished  Eyes:  Conjunctivae/corneas clear. PERRL.  Lungs: Normal respiratory effort,   clear to auscultation bilaterally   Heart: Regular rate and rhythm, S1, S2 normal, no murmur, rub or ruma.  Abdomen: Soft, non-tender non-distended; bowel sounds normal; no masses,  no organomegaly  Extremities: No cyanosis or clubbing. Trace edema BLE above ankle.    Skin: Skin color, texture, turgor normal. No rashes or lesions  Neurologic: Normal strength and tone. No focal numbness or weakness     Laboratory Data:  Reviewed and noted  where applicable- Please see chart for full lab data.    Medications:  Medication list was reviewed and changes noted under Assessment/Plan.      ASSESSMENT/PLAN:     1. Resolved nonoliguric MARYAM on CKD IV (Baseline variable 1.8) likely due to multiple factors including dehydration, fluctuating BP, uni-nephric while on ARB (N17.9, N18.4, Z90.5, I70.1):  Followed by Dr. Epperson at [facility].  Recently started on inspra for hyperaldo and has been feeling poorly since with variable BP's at home.  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 Lea who feels no significant stenosis and angio not warranted at this time. In the absence of an  absolute study such as CTA/ Angiogram we cannot say with 100% certainty, however the risk of these test with MARYAM is high and current evidence does not support this.   -Cr is now baseline  -Continue to hold ARB until she follows up with Dr. Epperson  -Has failed inspra so will change to guerrero  -Monitor K  -continue procardia 60 (no further escalation with edema) and coreg  -I think that there have been too many changes in medications too quickly that resulted in intolerance.  -I would tolerate a higher than desired BP for now   -hydralazine PRN  -I also recommended that upon d/c that she f/u only with Dr. Epperson for now so as to avoid too many changes to her her medications.  2. 1o Hyperaldosteronism (E26.9): as above      3. Bradycardia (R00.1):  Pt was on high dose labetolol, and developed evelyn (HR 30-40s).  Held agents and HR improved  to 80's- 100's.   coreg for now.   4. Hyperuricemia (E79.0):  Recheck as outpt once euvolemic.  May need allopurinol.   5. SLE (M32.9):  Continue plaquenil.  Defer. Anti-histone negative.       See above   Discussed with Dr. Vergara.    Home once BP better.

## 2018-03-28 VITALS
TEMPERATURE: 98 F | SYSTOLIC BLOOD PRESSURE: 139 MMHG | OXYGEN SATURATION: 98 % | HEIGHT: 62 IN | HEART RATE: 83 BPM | DIASTOLIC BLOOD PRESSURE: 63 MMHG | BODY MASS INDEX: 18.77 KG/M2 | RESPIRATION RATE: 18 BRPM | WEIGHT: 102 LBS

## 2018-03-28 LAB
ALBUMIN SERPL BCP-MCNC: 2.7 G/DL
ALP SERPL-CCNC: 72 U/L
ALT SERPL W/O P-5'-P-CCNC: 28 U/L
ANION GAP SERPL CALC-SCNC: 6 MMOL/L
AST SERPL-CCNC: 40 U/L
BILIRUB SERPL-MCNC: 0.2 MG/DL
BUN SERPL-MCNC: 36 MG/DL
CALCIUM SERPL-MCNC: 8.5 MG/DL
CHLORIDE SERPL-SCNC: 113 MMOL/L
CO2 SERPL-SCNC: 19 MMOL/L
CREAT SERPL-MCNC: 1.6 MG/DL
EST. GFR  (AFRICAN AMERICAN): 37 ML/MIN/1.73 M^2
EST. GFR  (NON AFRICAN AMERICAN): 32 ML/MIN/1.73 M^2
GLUCOSE SERPL-MCNC: 67 MG/DL
MAGNESIUM SERPL-MCNC: 1.5 MG/DL
PHOSPHATE SERPL-MCNC: 3.9 MG/DL
POTASSIUM SERPL-SCNC: 5.2 MMOL/L
PROT SERPL-MCNC: 6.3 G/DL
SODIUM SERPL-SCNC: 138 MMOL/L

## 2018-03-28 PROCEDURE — 99239 HOSP IP/OBS DSCHRG MGMT >30: CPT | Mod: ,,, | Performed by: HOSPITALIST

## 2018-03-28 PROCEDURE — 84100 ASSAY OF PHOSPHORUS: CPT

## 2018-03-28 PROCEDURE — 80053 COMPREHEN METABOLIC PANEL: CPT

## 2018-03-28 PROCEDURE — 25000003 PHARM REV CODE 250: Performed by: NURSE PRACTITIONER

## 2018-03-28 PROCEDURE — 83735 ASSAY OF MAGNESIUM: CPT

## 2018-03-28 PROCEDURE — 63600175 PHARM REV CODE 636 W HCPCS: Performed by: NURSE PRACTITIONER

## 2018-03-28 PROCEDURE — 36415 COLL VENOUS BLD VENIPUNCTURE: CPT

## 2018-03-28 RX ORDER — CLONIDINE HYDROCHLORIDE 0.1 MG/1
0.1 TABLET ORAL 2 TIMES DAILY
Qty: 60 TABLET | Refills: 0 | Status: ON HOLD | OUTPATIENT
Start: 2018-03-28 | End: 2018-04-06 | Stop reason: HOSPADM

## 2018-03-28 RX ORDER — NIFEDIPINE 30 MG/1
90 TABLET, EXTENDED RELEASE ORAL DAILY
Status: DISCONTINUED | OUTPATIENT
Start: 2018-03-29 | End: 2018-03-28 | Stop reason: HOSPADM

## 2018-03-28 RX ORDER — NIFEDIPINE 30 MG/1
30 TABLET, EXTENDED RELEASE ORAL ONCE
Status: COMPLETED | OUTPATIENT
Start: 2018-03-28 | End: 2018-03-28

## 2018-03-28 RX ORDER — SODIUM BICARBONATE 650 MG/1
650 TABLET ORAL 2 TIMES DAILY
Qty: 60 TABLET | Refills: 0 | Status: ON HOLD | OUTPATIENT
Start: 2018-03-28 | End: 2018-04-06 | Stop reason: HOSPADM

## 2018-03-28 RX ORDER — NIFEDIPINE 90 MG/1
90 TABLET, EXTENDED RELEASE ORAL DAILY
Qty: 30 TABLET | Refills: 0 | Status: ON HOLD | OUTPATIENT
Start: 2018-03-29 | End: 2018-04-06 | Stop reason: HOSPADM

## 2018-03-28 RX ORDER — ROSUVASTATIN CALCIUM 10 MG/1
10 TABLET, COATED ORAL NIGHTLY
Qty: 30 TABLET | Refills: 0 | Status: SHIPPED | OUTPATIENT
Start: 2018-03-28 | End: 2018-04-25 | Stop reason: SDUPTHER

## 2018-03-28 RX ORDER — SPIRONOLACTONE 50 MG/1
50 TABLET, FILM COATED ORAL 2 TIMES DAILY
Qty: 60 TABLET | Refills: 0 | Status: ON HOLD | OUTPATIENT
Start: 2018-03-28 | End: 2018-04-06 | Stop reason: HOSPADM

## 2018-03-28 RX ORDER — CLONIDINE HYDROCHLORIDE 0.1 MG/1
0.1 TABLET ORAL 2 TIMES DAILY
Status: DISCONTINUED | OUTPATIENT
Start: 2018-03-28 | End: 2018-03-28 | Stop reason: HOSPADM

## 2018-03-28 RX ORDER — ACETAMINOPHEN 325 MG/1
650 TABLET ORAL EVERY 6 HOURS
Refills: 0 | Status: ON HOLD | COMMUNITY
Start: 2018-03-28 | End: 2018-04-12

## 2018-03-28 RX ORDER — SODIUM BICARBONATE 650 MG/1
1 TABLET ORAL ONCE
Status: COMPLETED | OUTPATIENT
Start: 2018-03-28 | End: 2018-03-28

## 2018-03-28 RX ORDER — ASPIRIN 81 MG/1
81 TABLET ORAL DAILY
Qty: 30 TABLET | Refills: 0 | Status: SHIPPED | OUTPATIENT
Start: 2018-03-28 | End: 2020-02-17

## 2018-03-28 RX ORDER — SPIRONOLACTONE 25 MG/1
50 TABLET ORAL 2 TIMES DAILY
Status: DISCONTINUED | OUTPATIENT
Start: 2018-03-28 | End: 2018-03-28 | Stop reason: HOSPADM

## 2018-03-28 RX ORDER — CARVEDILOL 25 MG/1
25 TABLET ORAL 2 TIMES DAILY
Qty: 60 TABLET | Refills: 0 | Status: SHIPPED | OUTPATIENT
Start: 2018-03-28 | End: 2018-04-16 | Stop reason: SDUPTHER

## 2018-03-28 RX ORDER — FAMOTIDINE 20 MG/1
20 TABLET, FILM COATED ORAL DAILY
Qty: 30 TABLET | Refills: 0 | Status: SHIPPED | OUTPATIENT
Start: 2018-03-29 | End: 2018-04-16 | Stop reason: SDUPTHER

## 2018-03-28 RX ORDER — CARVEDILOL 12.5 MG/1
25 TABLET ORAL 2 TIMES DAILY
Status: DISCONTINUED | OUTPATIENT
Start: 2018-03-28 | End: 2018-03-28 | Stop reason: HOSPADM

## 2018-03-28 RX ORDER — LEVOTHYROXINE SODIUM 75 UG/1
75 TABLET ORAL
Qty: 30 TABLET | Refills: 0 | Status: SHIPPED | OUTPATIENT
Start: 2018-03-28 | End: 2018-07-16 | Stop reason: SDUPTHER

## 2018-03-28 RX ORDER — SODIUM BICARBONATE 650 MG/1
1 TABLET ORAL 2 TIMES DAILY
Status: DISCONTINUED | OUTPATIENT
Start: 2018-03-28 | End: 2018-03-28 | Stop reason: HOSPADM

## 2018-03-28 RX ADMIN — HEPARIN SODIUM 5000 UNITS: 5000 INJECTION, SOLUTION INTRAVENOUS; SUBCUTANEOUS at 05:03

## 2018-03-28 RX ADMIN — LEVOTHYROXINE SODIUM 75 MCG: 75 TABLET ORAL at 05:03

## 2018-03-28 RX ADMIN — CITALOPRAM HYDROBROMIDE 30 MG: 20 TABLET ORAL at 08:03

## 2018-03-28 RX ADMIN — FAMOTIDINE 20 MG: 20 TABLET, FILM COATED ORAL at 08:03

## 2018-03-28 RX ADMIN — NIFEDIPINE 60 MG: 30 TABLET, FILM COATED, EXTENDED RELEASE ORAL at 08:03

## 2018-03-28 RX ADMIN — SODIUM BICARBONATE 650 MG TABLET 650 MG: at 11:03

## 2018-03-28 RX ADMIN — SPIRONOLACTONE 50 MG: 25 TABLET, FILM COATED ORAL at 08:03

## 2018-03-28 RX ADMIN — HYDRALAZINE HYDROCHLORIDE 10 MG: 20 INJECTION INTRAMUSCULAR; INTRAVENOUS at 05:03

## 2018-03-28 RX ADMIN — CLONIDINE HYDROCHLORIDE 0.1 MG: 0.1 TABLET ORAL at 10:03

## 2018-03-28 RX ADMIN — NIFEDIPINE 30 MG: 30 TABLET, FILM COATED, EXTENDED RELEASE ORAL at 10:03

## 2018-03-28 RX ADMIN — ASPIRIN 81 MG: 81 TABLET, COATED ORAL at 08:03

## 2018-03-28 RX ADMIN — CARVEDILOL 12.5 MG: 12.5 TABLET, FILM COATED ORAL at 08:03

## 2018-03-28 RX ADMIN — HYDROXYCHLOROQUINE SULFATE 200 MG: 200 TABLET, FILM COATED ORAL at 08:03

## 2018-03-28 NOTE — DISCHARGE INSTRUCTIONS
Discharge Instructions: Eating a Low-Potassium Diet  Your health care provider has prescribed a low-potassium diet for you. This kind of diet is advised for people who have certain kidney problems. Potassium is needed for muscle function. But too much potassium is a health risk. Potassium is found in many foods. Read below to find out how to change your diet.  Foods to limit  Some foods are high in potassium. Limit your daily intake of the foods in the list below.  · Fruits: apricots (canned and fresh), bananas, cantaloupe, honeydew melon, kiwi, nectarines, pomegranates, oranges, orange juice, pears, dried fruits (apricots, dates, figs, prunes), and prune juice  · Vegetables: asparagus, avocado, artichoke, bamboo shoots, beets, brussels sprouts, cabbage, celery, chard, okra, potatoes (white and sweet), pumpkin, rutabaga, spinach (cooked), squash, tomato, tomato sauce, tomato juice, and vegetable juice cocktail  · Legumes: black-eyed peas, chickpeas, lentils, lima beans, navy beans, red kidney beans, soybeans, and split peas  · Nuts and seeds: almonds, Brazil nuts, cashews, peanuts, peanut butter, pecans, pumpkin seeds, sunflower seeds, and walnuts  · Breads and cereals: bran and whole-grain products  · Dairy foods: milk, cheese, ice cream, yogurt  · Animal protein: all forms of animal protein  · Other: chocolate, cocoa, coconut milk, and molasses  Tips  · Ask your health care provider how much potassium you are allowed each day. This will help you figure out serving sizes for your needs.  · Check labels for potassium. It may be listed as potassium chloride.  · Do not use salt substitutes. These often have potassium in them.  · Cook frozen fruits and vegetables in water. Rinse and drain them well before eating.  · Drain liquid from all canned fruits and vegetables. Rinse them before eating.  · Reduce the potassium in potatoes. Peel them, slice thinly, and soak in water for at least 4 hours.  · Reduce the  potassium in green leafy vegetables. Soak them in water for at least 4 hours.  · Eat white rice and refined white flour products. These include white bread, pasta, and grits.  Follow-up  Make a follow-up appointment as advised by our staff.  When to call your health care provider  Call your health care provider right away if you have any of the following:  · Fatigue  · Shortness of breath  · Chest pain  · Slow, irregular heartbeat  · Fainting  · Dizziness  · Lightheadedness  · Confusion   Date Last Reviewed: 6/21/2015  © 1334-2239 [employer]. [address] [place] [address]. All rights reserved. This information is not intended as a substitute for professional medical care. Always follow your healthcare professional's instructions.

## 2018-03-28 NOTE — PROGRESS NOTES
Progress Note  Nephrology    Admit Date: 3/19/2018   LOS: 7 days     SUBJECTIVE:     Follow-up For:  Hyperaldostronism    Interval History:   Extensive discussion with pt/attending/Dr. Vergara.  AM BP's elevated but most others stable.  No CP/SOB.  Renal fxn stable.  Discussed with daughter via phone.  Started Clonidine today.      OBJECTIVE:     Vital Signs Range  Temp:  [98.3 °F (36.8 °C)-98.9 °F (37.2 °C)]   Pulse:  [74-87]   Resp:  [16-18]   BP: (162-205)/(73-95)   SpO2:  [96 %-98 %]     I & O (Last 24H):    Intake/Output Summary (Last 24 hours) at 03/28/18 1020  Last data filed at 03/28/18 0600   Gross per 24 hour   Intake              200 ml   Output              700 ml   Net             -500 ml       Physical Exam:  General appearance: Well developed, well nourished  Eyes:  Conjunctivae/corneas clear. PERRL.  Lungs: Normal respiratory effort,   clear to auscultation bilaterally   Heart: Regular rate and rhythm, S1, S2 normal, no murmur, rub or ruma.  Abdomen: Soft, non-tender non-distended; bowel sounds normal; no masses,  no organomegaly  Extremities: No cyanosis or clubbing. Trace edema BLE.    Skin: Skin color, texture, turgor normal. No rashes or lesions  Neurologic: Normal strength and tone. No focal numbness or weakness     Laboratory Data:  Reviewed and noted  where applicable- Please see chart for full lab data.    Medications:  Medication list was reviewed and changes noted under Assessment/Plan.      ASSESSMENT/PLAN:     1. Resolved nonoliguric MARYAM on CKD IV (Baseline variable 1.8) likely due to multiple factors including dehydration, fluctuating BP, uni-nephric while on ARB (N17.9, N18.4, Z90.5, I70.1):  Follow[place] by [place]. Epperson at [facility] [place].  Recently started on inspra for hyperaldo and has been feeling poorly since with variable BP's at home.  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 In the absence of an absolute study such as CTA/ Angiogram we cannot say with 100% certainty, however the risk of these test with MARYAM is high and current evidence does not support this.   -Cr is now baseline  -Continue to hold ARB until oupt appt.  -Has failed inspra so changed to guerrero but mild >K so decreased dose to 50mg/d.  -Monitor K  -continue procardia and coreg.  -start low dose clonidine 0.1mg bid.   -I think that there have been too many changes in medications too quickly that resulted in intolerance.  -I would tolerate a higher than desired BP for now.  May consider hydralazine scheduled if intolerant to clonidine.     -hydralazine PRN  -I have d/w daughter and pt regarding close monitoring of BPs with log book and will go over results in clinic.  2. 1o Hyperaldosteronism (E26.9): as above      3. Bradycardia (R00.1):  Pt was on high dose labetolol, and developed evelyn (HR 30-40s).  Held agents and HR improved  to 80's- 100's.   coreg for now.   4. Hyperuricemia (E79.0):  Recheck as outpt once euvolemic.  May need allopurinol.   5. SLE (M32.9):  Continue plaquenil.  Defer. Anti-histone negative.   6. Chronic metabolic acidosis (E87.2): Will start sodium bicarbonate 650mg bid.  This will help shift K intracellularly.  Doubt mild sodium load will increase BP.      Dispo: pt would like to f/u with me in nephrology clinic.  Will have my office call her for appt within the next 5-10 days with labs prior.  D/W Dr. Vergara.    Gustabo Brizuela MD  Nephrology

## 2018-03-28 NOTE — PLAN OF CARE
Pt discharging home today. Family to transport. Rx to be delivered to pt prior to discharge.   Pt states no CM needs for discharge.     03/28/18 1613   Final Note   Assessment Type Final Discharge Note   Discharge Disposition Home   What phone number can be called within the next 1-3 days to see how you are doing after discharge? [phone]   Hospital Follow Up  Appt(s) scheduled? Yes   Discharge plans and expectations educations in teach back method with documentation complete? Yes   Right Care Referral Info   Post Acute Recommendation No Care

## 2018-03-28 NOTE — NURSING
Pt discharged per MD orders. Discharge paperwork and prescriptions given. Pt verbalized 100% understanding. IV removed with catheter intact. No signs of redness of swelling. Pt at bedside collecting belongings and awaiting family transportation.

## 2018-03-28 NOTE — PLAN OF CARE
Problem: Patient Care Overview  Goal: Plan of Care Review  Outcome: Ongoing (interventions implemented as appropriate)  Patient resting in bed at this time, bed in lowest and locked position, night light on, call light and all other important items within reach, purposeful rounding, bed alarm used as needed, no injuries reported, non skid foot wear, instructed to call for help as needed      RA, tolerating  Tele reading SR  Saline locked  No c/o pain at this time   BP 160s/70s during shift, hydralazine prn not needed at this time       No other complaints or concerns at this time  Will cont to monitor

## 2018-03-29 NOTE — DISCHARGE SUMMARY
[facility] Hospital Medicine  Discharge Summary      Patient Name: Ewelina Arnold  MRN: [id number]  Admission Date: 3/19/2018  Hospital Length of Stay: 7 days  Discharge Date and Time:  03/28/2018  Attending Physician: Louis Vergara MD   Discharging Provider: Louis Vergara MD  Primary Care Provider: Kalie Walton MD      HPI:   Patient is a 72 year-old woman with poorly controlled hypertension, chronic kidney disease stage IV (baseline serum creatinine of 1.8 mg/dL), primary hyperaldosteronism who presented to the emergency department with fatigue, nausea, and vomiting which she reports started soon after starting eplerenone.  Laboratory studies revealed evidence of acute kidney injury and patient was admitted to the hospital for treatment.      Hospital Course:   Patient is a 72 year-old woman with poorly controlled hypertension, chronic kidney disease stage IV (baseline serum creatinine of 1.8 mg/dL), primary hyperaldosteronism who was admitted to the hospital for treatment of acute renal failure with hypertensive urgency.  Blood pressure regimen adjusted to achieve reasonable blood pressure control along with stable kidney function.  Patient reports that should would like to avoid further care at [facility] and therefore would like to switch her nephrology care to [facility] with Dr. Gustabo Brizuela.  Patient discharged home and advised to follow-up with Dr. Brizuela in about 1 weeks time.     Consults:   Consults         Status Ordering Provider     Inpatient consult to Cardiology  Once     Provider:  Risa Lea MD    Completed RUBI TURNER     Inpatient consult to Nephrology  Once     Provider:  Joey Barnard NP    Completed KAMRAN NAVA          Final Active Diagnoses:    Diagnosis Date Noted POA    PRINCIPAL PROBLEM:  Acute kidney injury superimposed on chronic kidney disease stage IV [N17.9, N18.9] 01/21/2018 Yes    Hypertensive  crisis [I16.9] 03/19/2018 Yes    Hyperaldosteronism [E26.9] 03/21/2018 Yes    Chronic diastolic congestive heart failure [I50.32] 03/21/2018 Yes    Hypothyroidism [E03.9] 05/13/2015 Yes    Dyslipidemia [E78.5]  Yes      Problems Resolved During this Admission:    Diagnosis Date Noted Date Resolved POA    SLE (systemic lupus erythematosus) [M32.9] 11/27/2012 12/19/2012 Yes       Discharged Condition: Stable    Disposition: Home or Self Care    Follow Up:  Follow-up Information     Schedule an appointment as soon as possible for a visit with Gustabo Brizuela MD.    Specialty:  Nephrology  Why:  Management of blood pressure and chronic kidney disease  Contact information:  [address] [place] [address]  [address]  [facility] NEPHROLOGY  [facility] [id number] [phone]                 Patient Instructions:     Activity as tolerated     Notify your health care provider if you experience any of the following:  temperature >100.4     Notify your health care provider if you experience any of the following:  persistent nausea and vomiting or diarrhea     Notify your health care provider if you experience any of the following:  severe uncontrolled pain     Notify your health care provider if you experience any of the following:  difficulty breathing or increased cough     Notify your health care provider if you experience any of the following:  severe persistent headache     Notify your health care provider if you experience any of the following:  worsening rash     Notify your health care provider if you experience any of the following:  persistent dizziness, light-headedness, or visual disturbances     Notify your health care provider if you experience any of the following:  increased confusion or weakness         Medications:  Reconciled Home Medications:      Medication List      START taking these medications    acetaminophen 325 MG tablet  Commonly known as:  TYLENOL  Take 2 tablets (650 mg total) by mouth every 6 (six)  hours.     carvedilol 25 MG tablet  Commonly known as:  COREG  Take 1 tablet (25 mg total) by mouth 2 (two) times daily.     famotidine 20 MG tablet  Commonly known as:  PEPCID  Take 1 tablet (20 mg total) by mouth once daily.     NIFEdipine 90 MG (OSM) 24 hr tablet  Commonly known as:  PROCARDIA-XL  Take 1 tablet (90 mg total) by mouth once daily.     sodium bicarbonate 650 MG tablet  Take 1 tablet (650 mg total) by mouth 2 (two) times daily.        CHANGE how you take these medications    aspirin 81 MG EC tablet  Commonly known as:  ECOTRIN  Take 1 tablet (81 mg total) by mouth once daily.  What changed:  when to take this     cloNIDine 0.1 MG tablet  Commonly known as:  CATAPRES  Take 1 tablet (0.1 mg total) by mouth 2 (two) times daily.  What changed:  · medication strength  · how much to take     rosuvastatin 10 MG tablet  Commonly known as:  CRESTOR  Take 1 tablet (10 mg total) by mouth every evening.  What changed:  See the new instructions.     spironolactone 50 MG tablet  Commonly known as:  ALDACTONE  Take 1 tablet (50 mg total) by mouth 2 (two) times daily.  What changed:  · medication strength  · how much to take        CONTINUE taking these medications    citalopram 20 MG tablet  Commonly known as:  CELEXA  Take 1.5 tablets (30 mg total) by mouth once daily.     hydroxychloroquine 200 mg tablet  Commonly known as:  PLAQUENIL  TAKE 1 TABLET TWICE DAILY     latanoprost 0.005 % ophthalmic solution  INSTILL 1 DROP INTO BOTH EYES EVERY DAY     levothyroxine 75 MCG tablet  Commonly known as:  SYNTHROID  Take 1 tablet (75 mcg total) by mouth before breakfast.        STOP taking these medications    ammonium lactate 12 % Crea     azelastine 137 mcg (0.1 %) nasal spray  Commonly known as:  ASTELIN     cholestyramine 4 gram packet  Commonly known as:  QUESTRAN     conjugated estrogens vaginal cream  Commonly known as:  PREMARIN     cyproheptadine 4 mg tablet  Commonly known as:  PERIACTIN     eplerenone 50 MG  Tab  Commonly known as:  INSPRA     hydrALAZINE 50 MG tablet  Commonly known as:  APRESOLINE     ipratropium 0.03 % nasal spray  Commonly known as:  ATROVENT     irbesartan 300 MG tablet  Commonly known as:  AVAPRO     labetalol 200 MG tablet  Commonly known as:  NORMODYNE     meclizine 12.5 mg tablet  Commonly known as:  ANTIVERT     multivitamin capsule     nitroGLYCERIN 0.4 MG SL tablet  Commonly known as:  NITROSTAT     omeprazole 40 MG capsule  Commonly known as:  PRILOSEC     TENS units Sravanthi     triamcinolone acetonide 0.1% 0.1 % paste  Commonly known as:  KENALOG     verapamil 240 MG CR tablet  Commonly known as:  CALAN-SR     VITAMIN D2 50,000 unit Cap  Generic drug:  ergocalciferol           Where to Get Your Medications      These medications were sent to [facility] [address]  [address], [facility] [address]    Phone:  [phone]   · aspirin 81 MG EC tablet  · carvedilol 25 MG tablet  · cloNIDine 0.1 MG tablet  · famotidine 20 MG tablet  · levothyroxine 75 MCG tablet  · NIFEdipine 90 MG (OSM) 24 hr tablet  · rosuvastatin 10 MG tablet  · sodium bicarbonate 650 MG tablet  · spironolactone 50 MG tablet     You can get these medications from any pharmacy    You don't need a prescription for these medications  · acetaminophen 325 MG tablet         Indwelling Lines/Drains at time of discharge:   Lines/Drains/Airways          No matching active lines, drains, or airways          Time spent on the discharge of patient: 35 minutes  Patient was seen and examined on the date of discharge and determined to be suitable for discharge.         Louis Vergara MD  Department of Hospital Medicine  [facility]

## 2018-03-29 NOTE — PLAN OF CARE
03/28/18 1200   Medicare Message   Important Message from Medicare regarding Discharge Appeal Rights Given to patient/caregiver;Explained to patient/caregiver;Signed/date by patient/caregiver   Date IMM was signed 03/28/18   Time IMM was signed 1200

## 2018-04-01 ENCOUNTER — HOSPITAL ENCOUNTER (INPATIENT)
Facility: OTHER | Age: 73
LOS: 5 days | Discharge: HOME | DRG: 291 | End: 2018-04-06
Attending: EMERGENCY MEDICINE | Admitting: HOSPITALIST
Payer: MEDICARE

## 2018-04-01 DIAGNOSIS — N18.9 ACUTE KIDNEY INJURY SUPERIMPOSED ON CHRONIC KIDNEY DISEASE: ICD-10-CM

## 2018-04-01 DIAGNOSIS — R06.02 SOB (SHORTNESS OF BREATH): ICD-10-CM

## 2018-04-01 DIAGNOSIS — N17.9 AKI (ACUTE KIDNEY INJURY): ICD-10-CM

## 2018-04-01 DIAGNOSIS — I50.9 CONGESTIVE HEART FAILURE, UNSPECIFIED CONGESTIVE HEART FAILURE CHRONICITY, UNSPECIFIED CONGESTIVE HEART FAILURE TYPE: ICD-10-CM

## 2018-04-01 DIAGNOSIS — I20.89 ANGINA AT REST: ICD-10-CM

## 2018-04-01 DIAGNOSIS — N18.9 ACUTE KIDNEY INJURY SUPERIMPOSED ON CHRONIC KIDNEY DISEASE: Primary | ICD-10-CM

## 2018-04-01 DIAGNOSIS — N17.9 ACUTE KIDNEY INJURY SUPERIMPOSED ON CHRONIC KIDNEY DISEASE: Primary | ICD-10-CM

## 2018-04-01 DIAGNOSIS — N17.9 ACUTE KIDNEY INJURY: ICD-10-CM

## 2018-04-01 DIAGNOSIS — N17.9 ACUTE KIDNEY INJURY SUPERIMPOSED ON CHRONIC KIDNEY DISEASE: ICD-10-CM

## 2018-04-01 DIAGNOSIS — E87.5 HYPERKALEMIA: ICD-10-CM

## 2018-04-01 DIAGNOSIS — R06.02 SHORTNESS OF BREATH: ICD-10-CM

## 2018-04-01 DIAGNOSIS — R06.09 DOE (DYSPNEA ON EXERTION): ICD-10-CM

## 2018-04-01 DIAGNOSIS — I15.0 RENOVASCULAR HYPERTENSION: ICD-10-CM

## 2018-04-01 DIAGNOSIS — E87.20 METABOLIC ACIDOSIS, NORMAL ANION GAP (NAG): ICD-10-CM

## 2018-04-01 LAB
ABO + RH BLD: NORMAL
ALBUMIN SERPL BCP-MCNC: 2.7 G/DL
ALBUMIN SERPL BCP-MCNC: 2.7 G/DL
ALP SERPL-CCNC: 66 U/L
ALP SERPL-CCNC: 67 U/L
ALT SERPL W/O P-5'-P-CCNC: 23 U/L
ALT SERPL W/O P-5'-P-CCNC: 23 U/L
ANION GAP SERPL CALC-SCNC: 7 MMOL/L
ANION GAP SERPL CALC-SCNC: 8 MMOL/L
ANISOCYTOSIS BLD QL SMEAR: SLIGHT
AST SERPL-CCNC: 33 U/L
AST SERPL-CCNC: 34 U/L
BACTERIA #/AREA URNS HPF: NORMAL /HPF
BASOPHILS # BLD AUTO: 0.02 K/UL
BASOPHILS # BLD AUTO: ABNORMAL K/UL
BASOPHILS NFR BLD: 0 %
BASOPHILS NFR BLD: 0.5 %
BILIRUB SERPL-MCNC: 0.2 MG/DL
BILIRUB SERPL-MCNC: 0.2 MG/DL
BILIRUB UR QL STRIP: NEGATIVE
BLD GP AB SCN CELLS X3 SERPL QL: NORMAL
BNP SERPL-MCNC: 2608 PG/ML
BUN SERPL-MCNC: 49 MG/DL
BUN SERPL-MCNC: 50 MG/DL
BURR CELLS BLD QL SMEAR: ABNORMAL
CALCIUM SERPL-MCNC: 8.1 MG/DL
CALCIUM SERPL-MCNC: 8.1 MG/DL
CHLORIDE SERPL-SCNC: 110 MMOL/L
CHLORIDE SERPL-SCNC: 111 MMOL/L
CLARITY UR: CLEAR
CO2 SERPL-SCNC: 17 MMOL/L
CO2 SERPL-SCNC: 18 MMOL/L
COLOR UR: YELLOW
CREAT SERPL-MCNC: 2.9 MG/DL
CREAT SERPL-MCNC: 2.9 MG/DL
CREAT UR-MCNC: 38.2 MG/DL
CREAT UR-MCNC: 38.2 MG/DL
DIFFERENTIAL METHOD: ABNORMAL
DIFFERENTIAL METHOD: ABNORMAL
EOSINOPHIL # BLD AUTO: 0.1 K/UL
EOSINOPHIL # BLD AUTO: ABNORMAL K/UL
EOSINOPHIL NFR BLD: 1 %
EOSINOPHIL NFR BLD: 1.2 %
ERYTHROCYTE [DISTWIDTH] IN BLOOD BY AUTOMATED COUNT: 14.3 %
ERYTHROCYTE [DISTWIDTH] IN BLOOD BY AUTOMATED COUNT: 14.5 %
EST. GFR  (AFRICAN AMERICAN): 18 ML/MIN/1.73 M^2
EST. GFR  (AFRICAN AMERICAN): 18 ML/MIN/1.73 M^2
EST. GFR  (NON AFRICAN AMERICAN): 16 ML/MIN/1.73 M^2
EST. GFR  (NON AFRICAN AMERICAN): 16 ML/MIN/1.73 M^2
GLUCOSE SERPL-MCNC: 86 MG/DL
GLUCOSE SERPL-MCNC: 92 MG/DL
GLUCOSE UR QL STRIP: NEGATIVE
HCT VFR BLD AUTO: 22.5 %
HCT VFR BLD AUTO: 23.4 %
HGB BLD-MCNC: 7.2 G/DL
HGB BLD-MCNC: 7.4 G/DL
HGB UR QL STRIP: NEGATIVE
HYPOCHROMIA BLD QL SMEAR: ABNORMAL
INR PPP: 1
KETONES UR QL STRIP: NEGATIVE
LEUKOCYTE ESTERASE UR QL STRIP: ABNORMAL
LYMPHOCYTES # BLD AUTO: 1.5 K/UL
LYMPHOCYTES # BLD AUTO: ABNORMAL K/UL
LYMPHOCYTES NFR BLD: 28 %
LYMPHOCYTES NFR BLD: 36.7 %
MAGNESIUM SERPL-MCNC: 1.7 MG/DL
MAGNESIUM SERPL-MCNC: 1.8 MG/DL
MCH RBC QN AUTO: 25.4 PG
MCH RBC QN AUTO: 25.8 PG
MCHC RBC AUTO-ENTMCNC: 31.6 G/DL
MCHC RBC AUTO-ENTMCNC: 32 G/DL
MCV RBC AUTO: 80 FL
MCV RBC AUTO: 81 FL
METAMYELOCYTES NFR BLD MANUAL: 1 %
MICROALBUMIN UR DL<=1MG/L-MCNC: 47 UG/ML
MICROALBUMIN/CREATININE RATIO: 123 UG/MG
MICROSCOPIC COMMENT: NORMAL
MONOCYTES # BLD AUTO: 0.5 K/UL
MONOCYTES # BLD AUTO: ABNORMAL K/UL
MONOCYTES NFR BLD: 10 %
MONOCYTES NFR BLD: 11 %
NEUTROPHILS # BLD AUTO: 2.1 K/UL
NEUTROPHILS NFR BLD: 50.6 %
NEUTROPHILS NFR BLD: 59 %
NEUTS BAND NFR BLD MANUAL: 1 %
NITRITE UR QL STRIP: NEGATIVE
OVALOCYTES BLD QL SMEAR: ABNORMAL
PH UR STRIP: 6 [PH] (ref 5–8)
PHOSPHATE SERPL-MCNC: 5.3 MG/DL
PLATELET # BLD AUTO: 210 K/UL
PLATELET # BLD AUTO: 211 K/UL
PLATELET BLD QL SMEAR: ABNORMAL
PMV BLD AUTO: 9.2 FL
PMV BLD AUTO: 9.2 FL
POIKILOCYTOSIS BLD QL SMEAR: SLIGHT
POTASSIUM SERPL-SCNC: 5.3 MMOL/L
POTASSIUM SERPL-SCNC: 5.4 MMOL/L
PROT SERPL-MCNC: 6.1 G/DL
PROT SERPL-MCNC: 6.1 G/DL
PROT UR QL STRIP: ABNORMAL
PROT UR-MCNC: 9 MG/DL
PROT/CREAT RATIO, UR: 0.24
PROTHROMBIN TIME: 10.8 SEC
RBC # BLD AUTO: 2.79 M/UL
RBC # BLD AUTO: 2.91 M/UL
SODIUM SERPL-SCNC: 135 MMOL/L
SODIUM SERPL-SCNC: 136 MMOL/L
SP GR UR STRIP: >=1.03 (ref 1–1.03)
URN SPEC COLLECT METH UR: ABNORMAL
UROBILINOGEN UR STRIP-ACNC: NEGATIVE EU/DL
WBC # BLD AUTO: 3.85 K/UL
WBC # BLD AUTO: 4.09 K/UL
WBC #/AREA URNS HPF: 3 /HPF (ref 0–5)

## 2018-04-01 PROCEDURE — 83735 ASSAY OF MAGNESIUM: CPT | Mod: 91

## 2018-04-01 PROCEDURE — 84100 ASSAY OF PHOSPHORUS: CPT

## 2018-04-01 PROCEDURE — 99223 1ST HOSP IP/OBS HIGH 75: CPT | Mod: AI,,, | Performed by: HOSPITALIST

## 2018-04-01 PROCEDURE — 36415 COLL VENOUS BLD VENIPUNCTURE: CPT

## 2018-04-01 PROCEDURE — 83880 ASSAY OF NATRIURETIC PEPTIDE: CPT

## 2018-04-01 PROCEDURE — 81000 URINALYSIS NONAUTO W/SCOPE: CPT

## 2018-04-01 PROCEDURE — 84156 ASSAY OF PROTEIN URINE: CPT

## 2018-04-01 PROCEDURE — 63600175 PHARM REV CODE 636 W HCPCS: Performed by: NURSE PRACTITIONER

## 2018-04-01 PROCEDURE — 80053 COMPREHEN METABOLIC PANEL: CPT | Mod: 91

## 2018-04-01 PROCEDURE — 86850 RBC ANTIBODY SCREEN: CPT

## 2018-04-01 PROCEDURE — 63600175 PHARM REV CODE 636 W HCPCS: Performed by: EMERGENCY MEDICINE

## 2018-04-01 PROCEDURE — 85025 COMPLETE CBC W/AUTO DIFF WBC: CPT

## 2018-04-01 PROCEDURE — 83735 ASSAY OF MAGNESIUM: CPT

## 2018-04-01 PROCEDURE — 96374 THER/PROPH/DIAG INJ IV PUSH: CPT

## 2018-04-01 PROCEDURE — 80053 COMPREHEN METABOLIC PANEL: CPT

## 2018-04-01 PROCEDURE — 82043 UR ALBUMIN QUANTITATIVE: CPT

## 2018-04-01 PROCEDURE — 85027 COMPLETE CBC AUTOMATED: CPT

## 2018-04-01 PROCEDURE — 93010 ELECTROCARDIOGRAM REPORT: CPT | Mod: ,,, | Performed by: INTERNAL MEDICINE

## 2018-04-01 PROCEDURE — 85610 PROTHROMBIN TIME: CPT

## 2018-04-01 PROCEDURE — 25000003 PHARM REV CODE 250: Performed by: HOSPITALIST

## 2018-04-01 PROCEDURE — 82272 OCCULT BLD FECES 1-3 TESTS: CPT

## 2018-04-01 PROCEDURE — 11000001 HC ACUTE MED/SURG PRIVATE ROOM

## 2018-04-01 PROCEDURE — 99285 EMERGENCY DEPT VISIT HI MDM: CPT | Mod: 25

## 2018-04-01 PROCEDURE — 25000003 PHARM REV CODE 250: Performed by: NURSE PRACTITIONER

## 2018-04-01 PROCEDURE — 85007 BL SMEAR W/DIFF WBC COUNT: CPT

## 2018-04-01 RX ORDER — SODIUM BICARBONATE 650 MG/1
650 TABLET ORAL 2 TIMES DAILY
Status: DISCONTINUED | OUTPATIENT
Start: 2018-04-01 | End: 2018-04-04

## 2018-04-01 RX ORDER — HYDRALAZINE HYDROCHLORIDE 25 MG/1
50 TABLET, FILM COATED ORAL EVERY 8 HOURS
Status: DISCONTINUED | OUTPATIENT
Start: 2018-04-01 | End: 2018-04-03

## 2018-04-01 RX ORDER — HEPARIN SODIUM 5000 [USP'U]/ML
5000 INJECTION, SOLUTION INTRAVENOUS; SUBCUTANEOUS EVERY 8 HOURS
Status: DISCONTINUED | OUTPATIENT
Start: 2018-04-01 | End: 2018-04-06 | Stop reason: HOSPADM

## 2018-04-01 RX ORDER — CLONIDINE HYDROCHLORIDE 0.1 MG/1
0.1 TABLET ORAL 2 TIMES DAILY
Status: DISCONTINUED | OUTPATIENT
Start: 2018-04-01 | End: 2018-04-01

## 2018-04-01 RX ORDER — SODIUM CHLORIDE 0.9 % (FLUSH) 0.9 %
5 SYRINGE (ML) INJECTION
Status: DISCONTINUED | OUTPATIENT
Start: 2018-04-01 | End: 2018-04-06 | Stop reason: HOSPADM

## 2018-04-01 RX ORDER — HYDRALAZINE HYDROCHLORIDE 20 MG/ML
10 INJECTION INTRAMUSCULAR; INTRAVENOUS EVERY 8 HOURS PRN
Status: DISCONTINUED | OUTPATIENT
Start: 2018-04-01 | End: 2018-04-06 | Stop reason: HOSPADM

## 2018-04-01 RX ORDER — FUROSEMIDE 10 MG/ML
40 INJECTION INTRAMUSCULAR; INTRAVENOUS
Status: COMPLETED | OUTPATIENT
Start: 2018-04-01 | End: 2018-04-01

## 2018-04-01 RX ORDER — LATANOPROST 50 UG/ML
1 SOLUTION/ DROPS OPHTHALMIC NIGHTLY
Status: DISCONTINUED | OUTPATIENT
Start: 2018-04-01 | End: 2018-04-06 | Stop reason: HOSPADM

## 2018-04-01 RX ORDER — ROSUVASTATIN CALCIUM 10 MG/1
10 TABLET, COATED ORAL NIGHTLY
Status: DISCONTINUED | OUTPATIENT
Start: 2018-04-01 | End: 2018-04-06 | Stop reason: HOSPADM

## 2018-04-01 RX ORDER — ACETAMINOPHEN 325 MG/1
650 TABLET ORAL EVERY 4 HOURS PRN
Status: DISCONTINUED | OUTPATIENT
Start: 2018-04-01 | End: 2018-04-06 | Stop reason: HOSPADM

## 2018-04-01 RX ORDER — FUROSEMIDE 10 MG/ML
40 INJECTION INTRAMUSCULAR; INTRAVENOUS DAILY
Status: DISCONTINUED | OUTPATIENT
Start: 2018-04-01 | End: 2018-04-01

## 2018-04-01 RX ORDER — CARVEDILOL 12.5 MG/1
25 TABLET ORAL 2 TIMES DAILY
Status: DISCONTINUED | OUTPATIENT
Start: 2018-04-01 | End: 2018-04-06 | Stop reason: HOSPADM

## 2018-04-01 RX ORDER — SODIUM CHLORIDE 9 MG/ML
INJECTION, SOLUTION INTRAVENOUS CONTINUOUS
Status: DISCONTINUED | OUTPATIENT
Start: 2018-04-01 | End: 2018-04-01

## 2018-04-01 RX ORDER — ASPIRIN 81 MG/1
81 TABLET ORAL DAILY
Status: DISCONTINUED | OUTPATIENT
Start: 2018-04-01 | End: 2018-04-06 | Stop reason: HOSPADM

## 2018-04-01 RX ORDER — HYDROXYCHLOROQUINE SULFATE 200 MG/1
200 TABLET, FILM COATED ORAL 2 TIMES DAILY
Status: DISCONTINUED | OUTPATIENT
Start: 2018-04-01 | End: 2018-04-06 | Stop reason: HOSPADM

## 2018-04-01 RX ORDER — LEVOTHYROXINE SODIUM 75 UG/1
75 TABLET ORAL
Status: DISCONTINUED | OUTPATIENT
Start: 2018-04-01 | End: 2018-04-06 | Stop reason: HOSPADM

## 2018-04-01 RX ORDER — FAMOTIDINE 20 MG/1
20 TABLET, FILM COATED ORAL DAILY
Status: DISCONTINUED | OUTPATIENT
Start: 2018-04-01 | End: 2018-04-06 | Stop reason: HOSPADM

## 2018-04-01 RX ORDER — ONDANSETRON 2 MG/ML
4 INJECTION INTRAMUSCULAR; INTRAVENOUS EVERY 8 HOURS PRN
Status: DISCONTINUED | OUTPATIENT
Start: 2018-04-01 | End: 2018-04-06 | Stop reason: HOSPADM

## 2018-04-01 RX ORDER — SPIRONOLACTONE 25 MG/1
50 TABLET ORAL 2 TIMES DAILY
Status: DISCONTINUED | OUTPATIENT
Start: 2018-04-01 | End: 2018-04-01

## 2018-04-01 RX ADMIN — CLONIDINE HYDROCHLORIDE 0.1 MG: 0.1 TABLET ORAL at 09:04

## 2018-04-01 RX ADMIN — HEPARIN SODIUM 5000 UNITS: 5000 INJECTION, SOLUTION INTRAVENOUS; SUBCUTANEOUS at 06:04

## 2018-04-01 RX ADMIN — FAMOTIDINE 20 MG: 20 TABLET, FILM COATED ORAL at 09:04

## 2018-04-01 RX ADMIN — CITALOPRAM HYDROBROMIDE 30 MG: 20 TABLET ORAL at 09:04

## 2018-04-01 RX ADMIN — ASPIRIN 81 MG: 81 TABLET, COATED ORAL at 09:04

## 2018-04-01 RX ADMIN — LATANOPROST 1 DROP: 50 SOLUTION OPHTHALMIC at 08:04

## 2018-04-01 RX ADMIN — FUROSEMIDE 40 MG: 10 INJECTION, SOLUTION INTRAVENOUS at 03:04

## 2018-04-01 RX ADMIN — HEPARIN SODIUM 5000 UNITS: 5000 INJECTION, SOLUTION INTRAVENOUS; SUBCUTANEOUS at 09:04

## 2018-04-01 RX ADMIN — SODIUM BICARBONATE 650 MG TABLET 650 MG: at 09:04

## 2018-04-01 RX ADMIN — HEPARIN SODIUM 5000 UNITS: 5000 INJECTION, SOLUTION INTRAVENOUS; SUBCUTANEOUS at 02:04

## 2018-04-01 RX ADMIN — LEVOTHYROXINE SODIUM 75 MCG: 75 TABLET ORAL at 06:04

## 2018-04-01 RX ADMIN — SODIUM BICARBONATE 650 MG TABLET 650 MG: at 08:04

## 2018-04-01 RX ADMIN — ROSUVASTATIN CALCIUM 10 MG: 10 TABLET, FILM COATED ORAL at 08:04

## 2018-04-01 RX ADMIN — SPIRONOLACTONE 50 MG: 25 TABLET, FILM COATED ORAL at 09:04

## 2018-04-01 RX ADMIN — HYDRALAZINE HYDROCHLORIDE 50 MG: 25 TABLET, FILM COATED ORAL at 08:04

## 2018-04-01 RX ADMIN — HYDRALAZINE HYDROCHLORIDE 10 MG: 20 INJECTION INTRAMUSCULAR; INTRAVENOUS at 09:04

## 2018-04-01 RX ADMIN — HYDROXYCHLOROQUINE SULFATE 200 MG: 200 TABLET, FILM COATED ORAL at 09:04

## 2018-04-01 RX ADMIN — HYDRALAZINE HYDROCHLORIDE 50 MG: 25 TABLET, FILM COATED ORAL at 02:04

## 2018-04-01 RX ADMIN — CARVEDILOL 25 MG: 12.5 TABLET, FILM COATED ORAL at 08:04

## 2018-04-01 RX ADMIN — HYDROXYCHLOROQUINE SULFATE 200 MG: 200 TABLET, FILM COATED ORAL at 08:04

## 2018-04-01 RX ADMIN — CARVEDILOL 25 MG: 12.5 TABLET, FILM COATED ORAL at 09:04

## 2018-04-01 NOTE — HPI
This is a 72 y.o. Female, with a history of CKD, CHF, and Lupus, who presents with complaint of shortness of breath that began two days ago. Patient was admitted from 3/19 to 3/28 for MARYAM and emergent hypertension. She reports leg swelling that has worsened since discharge. She reports associated abdominal distension and decreased urination. She reports shortness of breath worsens with exertion. She denies fever, chills, cough, chest pain, dysuria, urinary urgency, or urinary frequency. Patient states she only has one kidney because she donated the other.

## 2018-04-01 NOTE — PLAN OF CARE
04/01/18 1136   KIM Message   Medicare Outpatient and Observation Notification regarding financial responsibility Given to patient/caregiver;Explained to patient/caregiver;Signed/date by patient/caregiver   Date KIM was signed 04/01/18   Time KIM was signed 1000

## 2018-04-01 NOTE — CONSULTS
Consult Note  Nephrology    Consult Requested By: Louis Vergara MD    Reason for Consult: CKD    SUBJECTIVE:     History of Present Illness: 72 y.o. Female, with a history of CKD, CHF, and Lupus, who presents with complaint of shortness of breath that began two days ago. Patient was admitted from 3/19 to 3/28 for MARYAM and  Hypertension (Baseline variable 1.8). She reports leg swelling that has worsened since discharge. She reports associated abdominal distension and decreased urination. She reports shortness of breath worsens with exertion. She denies fever, chills, cough, chest pain, dysuria, urinary urgency, or urinary frequency. Patient states she only has one kidney because she donated the other. No orthopnea. No h/o nsaids. Home meds unclear.    Past Medical History:   Diagnosis Date    Acute on chronic diastolic congestive heart failure 11/17/2017    Allergy     Anatomical narrow angle glaucoma     Anemia     States diagnosed about a month ago    Aortic atherosclerosis     Arthritis     Cataract     CHF (congestive heart failure)     Chronic kidney disease     Chronic sciatica of left side     Right lower back pain due to sciatica    Coronary artery disease     Depression     Dry eyes     GERD (gastroesophageal reflux disease)     History of colon cancer     Hyperaldosteronism     Hyperlipidemia     Hypertension     Hypothyroidism     Left ventricular diastolic dysfunction with preserved systolic function     Lupus (systemic lupus erythematosus) 8/17/2012    Malnutrition 5/16/2017    Osteoarthritis of cervical spine 8/17/2012    Osteopenia     PAD (peripheral artery disease)     FRAN (renal artery stenosis)      Past Surgical History:   Procedure Laterality Date    CARDIAC CATHETERIZATION  07/27/2011    x1    CHOLECYSTECTOMY  1999    COLON SURGERY  2000 & 2003    partial removal    COLONOSCOPY N/A 4/14/2016    Procedure: COLONOSCOPY;  Surgeon: Jose Steen MD;  Location: [facility]  ENDO (4TH FLR);  Service: Endoscopy;  Laterality: N/A;  prep 2 days prior light meals only/clear liquid day before  and 4 dulcolax tabs (5 mgs) at noon    COLONOSCOPY N/A 9/14/2017    Procedure: COLONOSCOPY;  Surgeon: Jose Steen MD;  Location: [facility] ENDO (4TH FLR);  Service: Endoscopy;  Laterality: N/A;    EYE SURGERY      HAND SURGERY Bilateral 1996 & 1997    due to carpal tunnel     HERNIA REPAIR      HYSTERECTOMY  1983    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    OOPHORECTOMY      removed one    Peripheral Iridotomy both eyes  1994    laser angle correction    THYROIDECTOMY, PARTIAL  2006    to remove two nodules and one-half thyroid     Family History   Problem Relation Age of Onset    Heart attack Mother     Hypertension Mother     Heart disease Father     Hypertension Father     Diabetes Maternal Grandmother     Glaucoma Maternal Grandmother     Hypertension Sister     Kidney disease Brother     Kidney failure Brother      received donated kidney from sister    No Known Problems Daughter     Diabetes Son     Hypertension Brother     Diabetes Maternal Aunt     No Known Problems Maternal Grandfather     No Known Problems Paternal Grandmother     No Known Problems Paternal Grandfather     Acne Neg Hx     Eczema Neg Hx     Lupus Neg Hx     Psoriasis Neg Hx     Melanoma Neg Hx     Amblyopia Neg Hx     Blindness Neg Hx     Cataracts Neg Hx     Macular degeneration Neg Hx     Retinal detachment Neg Hx     Strabismus Neg Hx      Social History   Substance Use Topics    Smoking status: Former Smoker     Packs/day: 1.50     Years: 30.00     Types: Cigarettes     Start date: 1955     Quit date: 3/13/1985    Smokeless tobacco: Never Used    Alcohol use No       Review of patient's allergies indicates:   Allergen Reactions    Ace inhibitors Swelling     Lips Swelling    Vioxx [rofecoxib] Swelling      lips swelling    Bactrim [sulfamethoxazole-trimethoprim]      Pt would like  this medication to be added due to elevation of creatinine with single kidney.    Procardia [nifedipine] Nausea Only and Edema     Feet swelling and nausea, now reports that this has happened in the past and required lasix    Arthrotec 50 [diclofenac-misoprostol]      Unknown    Bentyl [dicyclomine]      Not effective    Doxycycline Nausea Only    Imdur [isosorbide mononitrate] Nausea Only    Lomotil [diphenoxylate-atropine] Nausea And Vomiting     Stomach cramps with vomitting    Lozol [indapamide] Rash    Norvasc [amlodipine]      Not tolerated    Tramadol Nausea Only      Body mass index is 19.7 kg/m².    Review of Systems   Constitutional: Positive for activity change and fatigue. Negative for appetite change and fever.   HENT: Negative for congestion, ear pain, rhinorrhea and sinus pressure.    Eyes: Negative for pain and discharge.   Respiratory: Positive for cough and shortness of breath. Negative for chest tightness and wheezing.    Cardiovascular: Positive for leg swelling. Negative for chest pain.   Gastrointestinal: Negative for abdominal distention, abdominal pain, diarrhea, nausea and vomiting.   Endocrine: Negative for cold intolerance and heat intolerance.   Genitourinary: Negative for difficulty urinating, flank pain, frequency, hematuria and urgency.   Musculoskeletal: Negative for arthralgias, joint swelling and myalgias.   Allergic/Immunologic: Negative for environmental allergies and food allergies.   Neurological: Negative for dizziness, weakness, light-headedness and headaches.   Hematological: Does not bruise/bleed easily.   Psychiatric/Behavioral: Negative for agitation, behavioral problems and decreased concentration.     OBJECTIVE:     Vital Signs Range (Last 24H):  Temp:  [96.7 °F (35.9 °C)-97.9 °F (36.6 °C)]   Pulse:  [40-64]   Resp:  [18]   BP: (113-164)/(57-71)   SpO2:  [96 %-99 %]     Intake/Output Summary (Last 24 hours) at 04/01/18 1031  Last data filed at 04/01/18 4629    Gross per 24 hour   Intake                0 ml   Output              140 ml   Net             -140 ml       Physical Exam   Constitutional: She is oriented to person, place, and time. She appears well-developed and well-nourished.   HENT:   Head: Normocephalic.   Eyes: Conjunctivae are normal. Right eye exhibits no discharge. Left eye exhibits no discharge.   Neck: Normal range of motion. Neck supple.   Cardiovascular: Regular rhythm, normal heart sounds and intact distal pulses.  Bradycardia present.    Pulses:       Radial pulses are 2+ on the right side, and 2+ on the left side.        Dorsalis pedis pulses are 1+ on the right side, and 1+ on the left side.   2+ edema bilateral lower ext  Pulmonary/Chest: Effort normal and breath sounds normal. No respiratory distress. No crackles/wheezing  Abdominal: Soft. Bowel sounds are normal. She exhibits no distension. There is no tenderness.   Musculoskeletal: Normal range of motion.     Laboratory:      Recent Labs  Lab 04/01/18  0614   *   K 5.4*      CO2 18*   BUN 50*   CREATININE 2.9*   CALCIUM 8.1*   PHOS 5.3*     Lab Results   Component Value Date    .0 (H) 11/21/2016    CALCIUM 8.1 (L) 04/01/2018    PHOS 5.3 (H) 04/01/2018     Lab Results   Component Value Date    IRON 31 11/29/2017    TIBC 317 11/29/2017    FERRITIN 202 11/29/2017   BNP    Recent Labs  Lab 04/01/18  0216   BNP 2,608*       Diagnostic Results:    Reviewed most recent CT/US/Echo/MRI  Stable cardiomegaly.  Stable trace residual right pleural fluid and/or pleural thickening extending into the base of the major fissure.    ASSESSMENT/PLAN:     Hallucinations  -patient thinks it may be due to clonidine. Hold for now    MARYAM on CKD IV (Baseline variable 1.8) likely due to CHF? (N17.9, N18.4,):  [place] by [place]. Epp[place] [facility] [place].  Prior renal arterial doppler with some evidence of FRAN but repeat US reviewed by Dr. Lea: does not show significant stenosis   -Cr is now above  baseline  Plan  -hold aldactone  -agree with lasix given BNP/edema. Hold fluids.  -need echo. Nl ef 11/2017  -renal US in progress    Metabolic acidosis  -due to MARYAM? Vs SLE vs other    Edema  -may be due to CCB but may be due to SLE nephropathy or CHF? BNP is elevated.  Plan: check urine pr/cr. Likely does not need fluids and lasix. Will d/c fluids    Hyperkalemia  -hold aldactone  -lasix will help  -avoid nsaids    HTN  -may need to d/c Coreg given bradycardia  -I would tolerate a higher than desired BP for now   -hydralazine PRN    H/o Bradycardia (R00.1):  Pt was on high dose labetolol, and developed evelyn (HR 30-40s).  Held agents and HR improved  to 80's- 100's.  now evelyn on coreg. See above.    1o Hyperaldosteronism? (E26.9):  I trust that Dr. Epperson has done appropriate work up. Will restart aldactone once renal fnc is back to nl. Once more stable, this may be a good time to recheck cydney/renin    SLE (M32.9):  Continue plaquenil.  Defer. Anti-histone negative.    Anemia: may be due to sle vs other. Defer to hospitalist for w/u. Will check iron and dose epo if needed.    Thank you for allowing us to participate in the care of your patient. We will follow the patient and provide recommendations as needed.      Time spent seeing patient( greater than 1/2 spent in direct contact) : 65

## 2018-04-01 NOTE — SUBJECTIVE & OBJECTIVE
Past Medical History:   Diagnosis Date    Acute on chronic diastolic congestive heart failure 11/17/2017    Allergy     Anatomical narrow angle glaucoma     Anemia     States diagnosed about a month ago    Aortic atherosclerosis     Arthritis     Cataract     CHF (congestive heart failure)     Chronic kidney disease     Chronic sciatica of left side     Right lower back pain due to sciatica    Coronary artery disease     Depression     Dry eyes     GERD (gastroesophageal reflux disease)     History of colon cancer     Hyperaldosteronism     Hyperlipidemia     Hypertension     Hypothyroidism     Left ventricular diastolic dysfunction with preserved systolic function     Lupus (systemic lupus erythematosus) 8/17/2012    Malnutrition 5/16/2017    Osteoarthritis of cervical spine 8/17/2012    Osteopenia     PAD (peripheral artery disease)     FRAN (renal artery stenosis)        Past Surgical History:   Procedure Laterality Date    CARDIAC CATHETERIZATION  07/27/2011    x1    CHOLECYSTECTOMY  1999    COLON SURGERY  2000 & 2003    partial removal    COLONOSCOPY N/A 4/14/2016    Procedure: COLONOSCOPY;  Surgeon: Jose Steen MD;  Location: [facility] ([address] [place] [address]);  Service: Endoscopy;  Laterality: N/A;  prep 2 days prior light meals only/clear liquid day before  and 4 dulcolax tabs (5 mgs) at noon    COLONOSCOPY N/A 9/14/2017    Procedure: COLONOSCOPY;  Surgeon: Jose Steen MD;  Location: [facility] ([address] [place] [address]);  Service: Endoscopy;  Laterality: N/A;    EYE SURGERY      HAND SURGERY Bilateral 1996 & 1997    due to carpal tunnel     HERNIA REPAIR      HYSTERECTOMY  1983    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    Procardia [nifedipine] Nausea Only and Edema     Feet swelling and nausea, now reports that this has happened in the past and required lasix    Arthrotec 50 [diclofenac-misoprostol]      Unknown    Bentyl [dicyclomine]      Not effective    Doxycycline Nausea Only    Imdur [isosorbide mononitrate] Nausea Only    Lomotil [diphenoxylate-atropine] Nausea And Vomiting     Stomach cramps with vomitting    Lozol [indapamide] Rash    Norvasc [amlodipine]      Not tolerated    Tramadol Nausea Only       No current facility-administered medications on file prior to encounter.      Current Outpatient Prescriptions on File Prior to Encounter   Medication Sig    acetaminophen (TYLENOL) 325 MG tablet Take 2 tablets (650 mg total) by mouth every 6 (six) hours.    aspirin (ECOTRIN) 81 MG EC tablet Take 1 tablet (81 mg total) by mouth once daily.    carvedilol (COREG) 25 MG tablet Take 1 tablet (25 mg total) by mouth 2 (two) times daily.    citalopram (CELEXA) 20 MG tablet Take 1.5 tablets (30 mg total) by mouth once daily.    cloNIDine (CATAPRES) 0.1 MG tablet Take 1 tablet (0.1 mg total) by mouth 2 (two) times daily.    famotidine (PEPCID) 20 MG tablet Take 1 tablet (20 mg total) by mouth once daily.    hydroxychloroquine (PLAQUENIL) 200 mg tablet TAKE 1 TABLET TWICE DAILY    latanoprost 0.005 % ophthalmic solution INSTILL 1 DROP INTO BOTH EYES EVERY DAY    levothyroxine (SYNTHROID) 75 MCG tablet Take 1 tablet (75 mcg total) by mouth before breakfast.    NIFEdipine (PROCARDIA-XL) 90 MG (OSM) 24 hr tablet Take 1 tablet (90 mg total) by mouth once daily.    rosuvastatin (CRESTOR) 10 MG tablet Take 1 tablet (10 mg total) by mouth every evening.    sodium bicarbonate 650 MG tablet Take 1 tablet (650 mg total) by  mouth 2 (two) times daily.    spironolactone (ALDACTONE) 50 MG tablet Take 1 tablet (50 mg total) by mouth 2 (two) times daily.     Family History     Problem Relation (Age of Onset)    Diabetes Maternal Grandmother, Son, Maternal Aunt    Glaucoma Maternal Grandmother    Heart attack Mother    Heart disease Father    Hypertension Mother, Father, Sister, Brother    Kidney disease Brother    Kidney failure Brother    No Known Problems Daughter, Maternal Grandfather, Paternal Grandmother, Paternal Grandfather        Social History Main Topics    Smoking status: Former Smoker     Packs/day: 1.50     Years: 30.00     Types: Cigarettes     Start date: 1955     Quit date: 3/13/1985    Smokeless tobacco: Never Used    Alcohol use No    Drug use: No    Sexual activity: Not Currently     Partners: Male     Birth control/ protection: Abstinence     Review of Systems   Constitutional: Positive for activity change and fatigue. Negative for appetite change and fever.   HENT: Negative for congestion, ear pain, rhinorrhea and sinus pressure.    Eyes: Negative for pain and discharge.   Respiratory: Positive for cough and shortness of breath. Negative for chest tightness and wheezing.    Cardiovascular: Positive for leg swelling. Negative for chest pain.   Gastrointestinal: Negative for abdominal distention, abdominal pain, diarrhea, nausea and vomiting.   Endocrine: Negative for cold intolerance and heat intolerance.   Genitourinary: Negative for difficulty urinating, flank pain, frequency, hematuria and urgency.   Musculoskeletal: Negative for arthralgias, joint swelling and myalgias.   Allergic/Immunologic: Negative for environmental allergies and food allergies.   Neurological: Negative for dizziness, weakness, light-headedness and headaches.   Hematological: Does not bruise/bleed easily.   Psychiatric/Behavioral: Negative for agitation, behavioral problems and decreased concentration.     Objective:     Vital Signs (Most  Recent):  Temp: 97.9 °F (36.6 °C) (04/01/18 0358)  Pulse: (!) 48 (04/01/18 0456)  Resp: 18 (04/01/18 0116)  BP: 135/61 (04/01/18 0456)  SpO2: 98 % (04/01/18 0456) Vital Signs (24h Range):  Temp:  [97.7 °F (36.5 °C)-97.9 °F (36.6 °C)] 97.9 °F (36.6 °C)  Pulse:  [40-52] 48  Resp:  [18] 18  SpO2:  [97 %-98 %] 98 %  BP: (113-135)/(57-61) 135/61     Weight: 53.7 kg (118 lb 6.2 oz)  Body mass index is 19.7 kg/m².    Physical Exam   Constitutional: She is oriented to person, place, and time. She appears well-developed and well-nourished.   HENT:   Head: Normocephalic.   Eyes: Conjunctivae are normal. Right eye exhibits no discharge. Left eye exhibits no discharge.   Neck: Normal range of motion. Neck supple.   Cardiovascular: Regular rhythm, normal heart sounds and intact distal pulses.  Bradycardia present.    Pulses:       Radial pulses are 2+ on the right side, and 2+ on the left side.        Dorsalis pedis pulses are 1+ on the right side, and 1+ on the left side.   No pedal edema noted   Pulmonary/Chest: Effort normal and breath sounds normal. No respiratory distress.   Abdominal: Soft. Bowel sounds are normal. She exhibits no distension. There is no tenderness.   Musculoskeletal: Normal range of motion.   Neurological: She is alert and oriented to person, place, and time. She has normal strength. GCS eye subscore is 4. GCS verbal subscore is 5. GCS motor subscore is 6.   Skin: Skin is warm and dry. Capillary refill takes 2 to 3 seconds.   Psychiatric: She has a normal mood and affect. Her speech is normal and behavior is normal.           Significant Labs:   CBC:   Recent Labs  Lab 04/01/18 0216   WBC 4.09   HGB 7.2*   HCT 22.5*        CMP:   Recent Labs  Lab 04/01/18 0216      K 5.3*   *   CO2 17*   GLU 92   BUN 49*   CREATININE 2.9*   CALCIUM 8.1*   PROT 6.1   ALBUMIN 2.7*   BILITOT 0.2   ALKPHOS 66   AST 34   ALT 23   ANIONGAP 8   EGFRNONAA 16*     Cardiac Markers:   Recent Labs  Lab  04/01/18  0216   BNP 2,608*       Significant Imaging: I have reviewed all pertinent imaging results/findings within the past 24 hours.

## 2018-04-01 NOTE — ED NOTES
Pt assisted to restroom to obtain urine sample, NAD noted, respirations even and unlabored, Pt updated on POC, will continue to monitor.

## 2018-04-01 NOTE — ASSESSMENT & PLAN NOTE
H/H- 7.2/22.5. 4 days ago- 8.3/26.3    No visible blood loss. Decrease could be partially due to dilution  Type and Screen

## 2018-04-01 NOTE — ED PROVIDER NOTES
"Encounter Date: 4/1/2018    SCRIBE #1 NOTE: I, Myriam Snell, am scribing for, and in the presence of, Dr. Herring.       History     Chief Complaint   Patient presents with    Shortness of Breath     and "fluid"  x 2 days and getting worse, pt states swelling to the lower exts and abd     Time seen by provider: 1:55 AM    This is a 72 y.o. Female, with a history of CKD, CHF, and Lupus, who presents with complaint of shortness of breath and leg swelling that began two days ago. Patient was admitted from 3/19 to 3/28 for MARYAM and hypertensive urgency. She reports leg swelling that started before discharge and worsening since then, and feels her soft tissues of arm and abdomen also swollen. She reports decreased urination as well. She reports shortness of breath occurs with exertion, with no orthopnea. She denies fever, chills, cough, chest pain, dysuria, urinary urgency, or urinary frequency. Patient states she only has one kidney because she donated the other over 30 years ago      The history is provided by the patient.     Review of patient's allergies indicates:   Allergen Reactions    Ace inhibitors Swelling     Lips Swelling    Vioxx [rofecoxib] Swelling      lips swelling    Bactrim [sulfamethoxazole-trimethoprim]      Pt would like this medication to be added due to elevation of creatinine with single kidney.    Procardia [nifedipine] Nausea Only and Edema     Feet swelling and nausea, now reports that this has happened in the past and required lasix    Arthrotec 50 [diclofenac-misoprostol]      Unknown    Bentyl [dicyclomine]      Not effective    Doxycycline Nausea Only    Imdur [isosorbide mononitrate] Nausea Only    Lomotil [diphenoxylate-atropine] Nausea And Vomiting     Stomach cramps with vomitting    Lozol [indapamide] Rash    Norvasc [amlodipine]      Not tolerated    Tramadol Nausea Only     Past Medical History:   Diagnosis Date    Acute on chronic diastolic congestive heart failure " 11/17/2017    Allergy     Anatomical narrow angle glaucoma     Anemia     States diagnosed about a month ago    Aortic atherosclerosis     Arthritis     Cataract     CHF (congestive heart failure)     Chronic kidney disease     Chronic sciatica of left side     Right lower back pain due to sciatica    Coronary artery disease     Depression     Dry eyes     GERD (gastroesophageal reflux disease)     History of colon cancer     Hyperaldosteronism     Hyperlipidemia     Hypertension     Hypothyroidism     Left ventricular diastolic dysfunction with preserved systolic function     Lupus (systemic lupus erythematosus) 8/17/2012    Malnutrition 5/16/2017    Osteoarthritis of cervical spine 8/17/2012    Osteopenia     PAD (peripheral artery disease)     FRAN (renal artery stenosis)      Past Surgical History:   Procedure Laterality Date    CARDIAC CATHETERIZATION  07/27/2011    x1    CHOLECYSTECTOMY  1999    COLON SURGERY  2000 & 2003    partial removal    COLONOSCOPY N/A 4/14/2016    Procedure: COLONOSCOPY;  Surgeon: Jose Steen MD;  Location: [facility] ([address] [place] [address]);  Service: Endoscopy;  Laterality: N/A;  prep 2 days prior light meals only/clear liquid day before  and 4 dulcolax tabs (5 mgs) at noon    COLONOSCOPY N/A 9/14/2017    Procedure: COLONOSCOPY;  Surgeon: Jose Steen MD;  Location: [facility] ([address] [place] [address]);  Service: Endoscopy;  Laterality: N/A;    EYE SURGERY      HAND SURGERY Bilateral 1996 & 1997    due to carpal tunnel     HERNIA REPAIR      HYSTERECTOMY  1983    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    OOPHORECTOMY      removed one    Peripheral Iridotomy both eyes  1994    laser angle correction    THYROIDECTOMY, PARTIAL  2006    to remove two nodules and one-half thyroid     Family History   Problem Relation Age of Onset    Heart attack Mother     Hypertension Mother     Heart disease Father     Hypertension Father     Diabetes Maternal Grandmother     Glaucoma  Maternal Grandmother     Hypertension Sister     Kidney disease Brother     Kidney failure Brother      received donated kidney from sister    No Known Problems Daughter     Diabetes Son     Hypertension Brother     Diabetes Maternal Aunt     No Known Problems Maternal Grandfather     No Known Problems Paternal Grandmother     No Known Problems Paternal Grandfather     Acne Neg Hx     Eczema Neg Hx     Lupus Neg Hx     Psoriasis Neg Hx     Melanoma Neg Hx     Amblyopia Neg Hx     Blindness Neg Hx     Cataracts Neg Hx     Macular degeneration Neg Hx     Retinal detachment Neg Hx     Strabismus Neg Hx      Social History   Substance Use Topics    Smoking status: Former Smoker     Packs/day: 1.50     Years: 30.00     Types: Cigarettes     Start date: 1955     Quit date: 3/13/1985    Smokeless tobacco: Never Used    Alcohol use No     Review of Systems   Constitutional: Negative for chills and fever.   HENT: Negative for sore throat.    Eyes: Negative for photophobia.   Respiratory: Positive for shortness of breath. Negative for cough.    Cardiovascular: Positive for leg swelling. Negative for chest pain.   Gastrointestinal: Positive for abdominal distention. Negative for abdominal pain, diarrhea, nausea and vomiting.   Genitourinary: Positive for decreased urine volume. Negative for dysuria, frequency and urgency.   Musculoskeletal: Negative for back pain and myalgias.   Skin: Negative for rash.   Neurological: Negative for weakness.   Hematological: Does not bruise/bleed easily.   Psychiatric/Behavioral: Negative for confusion.       Physical Exam     Initial Vitals [04/01/18 0116]   BP Pulse Resp Temp SpO2   (!) 120/57 (!) 52 18 97.7 °F (36.5 °C) 97 %      MAP       78         Physical Exam    Nursing note and vitals reviewed.  Constitutional: She appears well-developed and well-nourished. She is not diaphoretic. No distress.   HENT:   Head: Normocephalic and atraumatic.   Right Ear: External  ear normal.   Left Ear: External ear normal.   Eyes: EOM are normal. Right eye exhibits no discharge. Left eye exhibits no discharge.   Neck: Normal range of motion.   Cardiovascular: Regular rhythm. Bradycardia present.  Exam reveals no gallop and no friction rub.    Murmur heard.   Systolic murmur is present with a grade of 3/6   Pulmonary/Chest: Breath sounds normal. No respiratory distress. She has no wheezes. She has no rhonchi. She has no rales.   Abdominal: Soft. There is no tenderness. There is no rebound and no guarding.   Musculoskeletal: Normal range of motion. She exhibits no edema or tenderness.   No lower extremity edema.    Neurological: She is alert and oriented to person, place, and time.   Skin: Skin is warm and dry. No rash and no abscess noted. No erythema. No pallor.   Psychiatric: She has a normal mood and affect. Her behavior is normal. Judgment and thought content normal.         ED Course   Procedures  Labs Reviewed   CBC W/ AUTO DIFFERENTIAL - Abnormal; Notable for the following:        Result Value    RBC 2.79 (*)     Hemoglobin 7.2 (*)     Hematocrit 22.5 (*)     MCV 81 (*)     MCH 25.8 (*)     All other components within normal limits   COMPREHENSIVE METABOLIC PANEL - Abnormal; Notable for the following:     Potassium 5.3 (*)     Chloride 111 (*)     CO2 17 (*)     BUN, Bld 49 (*)     Creatinine 2.9 (*)     Calcium 8.1 (*)     Albumin 2.7 (*)     eGFR if  18 (*)     eGFR if non  16 (*)     All other components within normal limits   B-TYPE NATRIURETIC PEPTIDE - Abnormal; Notable for the following:     BNP 2,608 (*)     All other components within normal limits   MAGNESIUM   PROTIME-INR     EKG Readings: (Independently Interpreted)   Sinus bradycardia at a rate of 51 bpm. No significant change from previous.      Imaging Results          X-Ray Chest 1 View (Final result)  Result time 04/01/18 02:43:29    Final result by Miguel Angel Mendes MD (04/01/18  02:43:29)                 Impression:      Stable cardiomegaly.  Stable trace residual right pleural fluid and/or pleural thickening extending into the base of the major fissure.    No significant change from prior study.      Electronically signed by: Miguel Angel Mendes MD  Date:    04/01/2018  Time:    02:43             Narrative:    EXAMINATION:  XR CHEST 1 VIEW    CLINICAL HISTORY:  Other forms of dyspnea    TECHNIQUE:  Single frontal view of the chest was performed.    COMPARISON:  January 15, 2018    FINDINGS:  Stable cardiomegaly.  Stable trace residual right pleural fluid and/or pleural thickening extending into the base of the major fissure.    Heart and lungs  appear unchanged when allowing for differences in technique and positioning.                                   Medical Decision Making:   Initial Assessment:       72F with h/o CKD, CHF, SLE presents with PEDRO and worsening leg and diffuse soft tissue swelling since discharge 3 days ago. She was admitted for correction of renal fxn and elevated BP, and Cr improved to 1.6 on discharge. Per chart review, she has complicated medical hx and one kidney, and was admitted for CHF exacerbation in November, but is now only on Aldactone for primary hyperaldosteronism but no diuretics, possibly over concern that she was dehydrated on last admit causing worsened kidney function.     Normal lung exam with no SOB/hypoxia at rest in ED, and CXR with no sign CHF. However BNP elevated similarly to last CHF exacerbation, and she has MARYAM with Cr now 2.9. Hgb also dropped to 7.2 from 8.3 previously; no sign GI bleed, but could have hemo-dilution from CHF. Treated with IV Lasix in ED and will re-admit for CHF/MARYAM.       Clinical Tests:   Lab Tests: Ordered and Reviewed  Radiological Study: Ordered and Reviewed  Medical Tests: Ordered and Reviewed              Attending Attestation:           Physician Attestation for Scribe:  Physician Attestation Statement for Scribe #1: I,  Dr. Herring, reviewed documentation, as scribed by Myriam Snell in my presence, and it is both accurate and complete.                    Clinical Impression:     1. MARYAM (acute kidney injury)    2. Shortness of breath    3. PEDRO (dyspnea on exertion)    4. Congestive heart failure, unspecified congestive heart failure chronicity, unspecified congestive heart failure type                                 Carlitos Herring MD  04/01/18 [id number]

## 2018-04-01 NOTE — H&P
"[facility] Medicine  History & Physical    Patient Name: Ewelina Arnold  MRN: [id number]  Admission Date: 4/1/2018  Attending Physician: Carlitos Herring MD   Primary Care Provider: Kalie Walton MD         Patient information was obtained from patient, past medical records and ER records.     Subjective:     Principal Problem:Acute kidney injury superimposed on chronic kidney disease    Chief Complaint:   Chief Complaint   Patient presents with    Shortness of Breath     and "fluid"  x 2 days and getting worse, pt states swelling to the lower exts and abd        HPI: This is a 72 y.o. Female, with a history of CKD, CHF, and Lupus, who presents with complaint of shortness of breath that began two days ago. Patient was admitted from 3/19 to 3/28 for MARYAM and emergent hypertension. She reports leg swelling that has worsened since discharge. She reports associated abdominal distension and decreased urination. She reports shortness of breath worsens with exertion. She denies fever, chills, cough, chest pain, dysuria, urinary urgency, or urinary frequency. Patient states she only has one kidney because she donated the other.     Past Medical History:   Diagnosis Date    Acute on chronic diastolic congestive heart failure 11/17/2017    Allergy     Anatomical narrow angle glaucoma     Anemia     States diagnosed about a month ago    Aortic atherosclerosis     Arthritis     Cataract     CHF (congestive heart failure)     Chronic kidney disease     Chronic sciatica of left side     Right lower back pain due to sciatica    Coronary artery disease     Depression     Dry eyes     GERD (gastroesophageal reflux disease)     History of colon cancer     Hyperaldosteronism     Hyperlipidemia     Hypertension     Hypothyroidism     Left ventricular diastolic dysfunction with preserved systolic function     Lupus (systemic lupus erythematosus) 8/17/2012    Malnutrition " 5/16/2017    Osteoarthritis of cervical spine 8/17/2012    Osteopenia     PAD (peripheral artery disease)     FRAN (renal artery stenosis)        Past Surgical History:   Procedure Laterality Date    CARDIAC CATHETERIZATION  07/27/2011    x1    CHOLECYSTECTOMY  1999    COLON SURGERY  2000 & 2003    partial removal    COLONOSCOPY N/A 4/14/2016    Procedure: COLONOSCOPY;  Surgeon: Jose Steen MD;  Location: [facility] ENDO (4TH FLR);  Service: Endoscopy;  Laterality: N/A;  prep 2 days prior light meals only/clear liquid day before  and 4 dulcolax tabs (5 mgs) at noon    COLONOSCOPY N/A 9/14/2017    Procedure: COLONOSCOPY;  Surgeon: Jose Steen MD;  [facility]  Service: Endoscopy;  Laterality: N/A;    EYE SURGERY      HAND SURGERY Bilateral 1996 & 1997    due to carpal tunnel     HERNIA REPAIR      HYSTERECTOMY  1983    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    Procardia [nifedipine] Nausea Only and Edema     Feet swelling and nausea, now reports that this has happened in the past and required lasix    Arthrotec 50 [diclofenac-misoprostol]      Unknown    Bentyl [dicyclomine]      Not effective    Doxycycline Nausea Only    Imdur [isosorbide mononitrate] Nausea Only    Lomotil [diphenoxylate-atropine] Nausea And Vomiting     Stomach cramps with vomitting    Lozol [indapamide] Rash    Norvasc [amlodipine]      Not tolerated    Tramadol Nausea Only       No current facility-administered medications on  file prior to encounter.      Current Outpatient Prescriptions on File Prior to Encounter   Medication Sig    acetaminophen (TYLENOL) 325 MG tablet Take 2 tablets (650 mg total) by mouth every 6 (six) hours.    aspirin (ECOTRIN) 81 MG EC tablet Take 1 tablet (81 mg total) by mouth once daily.    carvedilol (COREG) 25 MG tablet Take 1 tablet (25 mg total) by mouth 2 (two) times daily.    citalopram (CELEXA) 20 MG tablet Take 1.5 tablets (30 mg total) by mouth once daily.    cloNIDine (CATAPRES) 0.1 MG tablet Take 1 tablet (0.1 mg total) by mouth 2 (two) times daily.    famotidine (PEPCID) 20 MG tablet Take 1 tablet (20 mg total) by mouth once daily.    hydroxychloroquine (PLAQUENIL) 200 mg tablet TAKE 1 TABLET TWICE DAILY    latanoprost 0.005 % ophthalmic solution INSTILL 1 DROP INTO BOTH EYES EVERY DAY    levothyroxine (SYNTHROID) 75 MCG tablet Take 1 tablet (75 mcg total) by mouth before breakfast.    NIFEdipine (PROCARDIA-XL) 90 MG (OSM) 24 hr tablet Take 1 tablet (90 mg total) by mouth once daily.    rosuvastatin (CRESTOR) 10 MG tablet Take 1 tablet (10 mg total) by mouth every evening.    sodium bicarbonate 650 MG tablet Take 1 tablet (650 mg total) by mouth 2 (two) times daily.    spironolactone (ALDACTONE) 50 MG tablet Take 1 tablet (50 mg total) by mouth 2 (two) times daily.     Family History     Problem Relation (Age of Onset)    Diabetes Maternal Grandmother, Son, Maternal Aunt    Glaucoma Maternal Grandmother    Heart attack Mother    Heart disease Father    Hypertension Mother, Father, Sister, Brother    Kidney disease Brother    Kidney failure Brother    No Known Problems Daughter, Maternal Grandfather, Paternal Grandmother, Paternal Grandfather        Social History Main Topics    Smoking status: Former Smoker     Packs/day: 1.50     Years: 30.00     Types: Cigarettes     Start date: 1955     Quit date: 3/13/1985    Smokeless tobacco: Never Used    Alcohol use No    Drug use: No     Sexual activity: Not Currently     Partners: Male     Birth control/ protection: Abstinence     Review of Systems   Constitutional: Positive for activity change and fatigue. Negative for appetite change and fever.   HENT: Negative for congestion, ear pain, rhinorrhea and sinus pressure.    Eyes: Negative for pain and discharge.   Respiratory: Positive for cough and shortness of breath. Negative for chest tightness and wheezing.    Cardiovascular: Positive for leg swelling. Negative for chest pain.   Gastrointestinal: Negative for abdominal distention, abdominal pain, diarrhea, nausea and vomiting.   Endocrine: Negative for cold intolerance and heat intolerance.   Genitourinary: Negative for difficulty urinating, flank pain, frequency, hematuria and urgency.   Musculoskeletal: Negative for arthralgias, joint swelling and myalgias.   Allergic/Immunologic: Negative for environmental allergies and food allergies.   Neurological: Negative for dizziness, weakness, light-headedness and headaches.   Hematological: Does not bruise/bleed easily.   Psychiatric/Behavioral: Negative for agitation, behavioral problems and decreased concentration.     Objective:     Vital Signs (Most Recent):  Temp: 97.9 °F (36.6 °C) (04/01/18 0358)  Pulse: (!) 48 (04/01/18 0456)  Resp: 18 (04/01/18 0116)  BP: 135/61 (04/01/18 0456)  SpO2: 98 % (04/01/18 0456) Vital Signs (24h Range):  Temp:  [97.7 °F (36.5 °C)-97.9 °F (36.6 °C)] 97.9 °F (36.6 °C)  Pulse:  [40-52] 48  Resp:  [18] 18  SpO2:  [97 %-98 %] 98 %  BP: (113-135)/(57-61) 135/61     Weight: 53.7 kg (118 lb 6.2 oz)  Body mass index is 19.7 kg/m².    Physical Exam   Constitutional: She is oriented to person, place, and time. She appears well-developed and well-nourished.   HENT:   Head: Normocephalic.   Eyes: Conjunctivae are normal. Right eye exhibits no discharge. Left eye exhibits no discharge.   Neck: Normal range of motion. Neck supple.   Cardiovascular: Regular rhythm, normal heart  sounds and intact distal pulses.  Bradycardia present.    Pulses:       Radial pulses are 2+ on the right side, and 2+ on the left side.        Dorsalis pedis pulses are 1+ on the right side, and 1+ on the left side.   No pedal edema noted   Pulmonary/Chest: Effort normal and breath sounds normal. No respiratory distress.   Abdominal: Soft. Bowel sounds are normal. She exhibits no distension. There is no tenderness.   Musculoskeletal: Normal range of motion.   Neurological: She is alert and oriented to person, place, and time. She has normal strength. GCS eye subscore is 4. GCS verbal subscore is 5. GCS motor subscore is 6.   Skin: Skin is warm and dry. Capillary refill takes 2 to 3 seconds.   Psychiatric: She has a normal mood and affect. Her speech is normal and behavior is normal.           Significant Labs:   CBC:   Recent Labs  Lab 04/01/18 0216   WBC 4.09   HGB 7.2*   HCT 22.5*        CMP:   Recent Labs  Lab 04/01/18 0216      K 5.3*   *   CO2 17*   GLU 92   BUN 49*   CREATININE 2.9*   CALCIUM 8.1*   PROT 6.1   ALBUMIN 2.7*   BILITOT 0.2   ALKPHOS 66   AST 34   ALT 23   ANIONGAP 8   EGFRNONAA 16*     Cardiac Markers:   Recent Labs  Lab 04/01/18 0216   BNP 2,608*       Significant Imaging: I have reviewed all pertinent imaging results/findings within the past 24 hours.    Assessment/Plan:     * Acute kidney injury superimposed on chronic kidney disease stage IV    Patient discharged on 3/28 with a creatinine of 1.6. Today, creatinine is 2.9    Consult Nephrology          Hyperaldosteronism    Continue aldactone          Chronic diastolic congestive heart failure    Echo done in November- diastolic dysfunction, elevated PA pressures, preserved EF. BNP- 2608    Lasix in ER  Will hold off on further doses of Lasix with worsening kidney function in a solitary kidney            Anemia    H/H- 7.2/22.5. 4 days ago- 8.3/26.3    No visible blood loss. Decrease could be partially due to  dilution  Type and Screen          Dyslipidemia    Lipid Panel done on last hospitalization    Continue Crestor          Hypothyroidism    TSH pending  Continue levothyroxine            VTE Risk Mitigation     None             Jayden Magana NP  Department of Hospital Medicine   [facility]

## 2018-04-01 NOTE — ED TRIAGE NOTES
Pt to ED with CO SOB, and swelling to BLEs, and abdomen. Pt reports that she has been compliant with her Rx, and has low Na diet. Pt denies CP, diaphoresis, N/V/D.

## 2018-04-01 NOTE — ED NOTES
Pt states she was unable to urinate, and only had the urge to go. Pt also states she became weak, and SOB when ambulating to restroom. NAD noted respirations even and unlabored, breath sounds remain clear.

## 2018-04-01 NOTE — ASSESSMENT & PLAN NOTE
Echo done in November- diastolic dysfunction, elevated PA pressures, preserved EF. BNP- 2608    Lasix in ER  Will hold off on further doses of Lasix with worsening kidney function in a solitary kidney

## 2018-04-01 NOTE — NURSING
Placed 16Fr gambino with no complications to pt at this time. Pt tolerated well. Clear pale yellow urine in metered bag

## 2018-04-01 NOTE — PLAN OF CARE
Met with patient at bedside to complete discharge planning assessment. Patient is current with Dr Walton PCP & pharmacy of choice is [facility] [address]. Patient recently discharged from [facility] & has returned for SOB. Patient reports independence in ADLs & denies any anticipated DC needs      04/01/18 1137   Discharge Assessment   Assessment Type Discharge Planning Assessment   Confirmed/corrected address and phone number on facesheet? Yes   Assessment information obtained from? Patient   Communicated expected length of stay with patient/caregiver no   Prior to hospitilization cognitive status: Alert/Oriented   Prior to hospitalization functional status: Independent   Current cognitive status: Alert/Oriented   Current Functional Status: Independent   Lives With grandchild(guillermo)   Able to Return to Prior Arrangements yes   Is patient able to care for self after discharge? Yes   Patient's perception of discharge disposition home or selfcare   Readmission Within The Last 30 Days current reason for admission unrelated to previous admission   If yes, most recent facility name: [facility]   Patient currently being followed by outpatient case management? No   Patient currently receives any other outside agency services? No   Equipment Currently Used at Home walker, rolling;rollator   Do you have any problems affording any of your prescribed medications? No   Is the patient taking medications as prescribed? yes   Does the patient have transportation home? Yes   Transportation Available family or friend will provide   Does the patient receive services at the [facility]? No   Discharge Plan A Home with family   Patient/Family In Agreement With Plan yes   Readmission Questionnaire   At the time of your discharge, did someone talk to you about what your health problems were? Yes   At the time of discharge, did someone talk to you about what to watch out for regarding worsening of your health problem? Yes   At  the time of discharge, did someone talk to you about what to do if you experienced worsening of your health problem? Yes   At the time of discharge, did someone talk to you about which medication to take when you left the hospital and which ones to stop taking? Yes   At the time of discharge, did someone talk to you about when and where to follow up with a doctor after you left the hospital? Yes   What do you believe caused you to be sick enough to be re-admitted? SOB   How often do you need to have someone help you when you read instructions, pamphlets, or other written material from your doctor or pharmacy? Rarely   Do you have problems taking your medications as prescribed? No   Do you have any problems affording any of  your prescribed medications? No   Do you have problems obtaining/receiving your medications? No   Does the patient have transportation to healthcare appointments? Yes   Living Arrangements house   Does the patient have family/friends to help with healtcare needs after discharge? yes   Does your caregiver provide all the help you need? Yes   Are you currently feeling confused? No   Are you currently having problems thinking? No   Are you currently having memory problems? No   Have you felt down, depressed, or hopeless? 1   Have you felt little interest or pleasure in doing things? 1   In the last 7 days, my sleep quality was: good

## 2018-04-02 PROBLEM — D50.9 MICROCYTIC ANEMIA: Status: ACTIVE | Noted: 2017-10-16

## 2018-04-02 LAB
ANION GAP SERPL CALC-SCNC: 7 MMOL/L
AORTIC VALVE REGURGITATION: ABNORMAL
AORTIC VALVE STENOSIS: ABNORMAL
BASOPHILS # BLD AUTO: 0.01 K/UL
BASOPHILS NFR BLD: 0.2 %
BUN SERPL-MCNC: 47 MG/DL
CALCIUM SERPL-MCNC: 7.9 MG/DL
CHLORIDE SERPL-SCNC: 111 MMOL/L
CO2 SERPL-SCNC: 19 MMOL/L
CREAT SERPL-MCNC: 2.6 MG/DL
DIASTOLIC DYSFUNCTION: YES
DIFFERENTIAL METHOD: ABNORMAL
EOSINOPHIL # BLD AUTO: 0.1 K/UL
EOSINOPHIL NFR BLD: 1.4 %
ERYTHROCYTE [DISTWIDTH] IN BLOOD BY AUTOMATED COUNT: 14.6 %
EST. GFR  (AFRICAN AMERICAN): 20 ML/MIN/1.73 M^2
EST. GFR  (NON AFRICAN AMERICAN): 18 ML/MIN/1.73 M^2
ESTIMATED PA SYSTOLIC PRESSURE: 52.36
GLOBAL PERICARDIAL EFFUSION: ABNORMAL
GLUCOSE SERPL-MCNC: 73 MG/DL
HCT VFR BLD AUTO: 24 %
HGB BLD-MCNC: 7.7 G/DL
IRON SERPL-MCNC: 32 UG/DL
LYMPHOCYTES # BLD AUTO: 1.4 K/UL
LYMPHOCYTES NFR BLD: 32.5 %
MAGNESIUM SERPL-MCNC: 1.7 MG/DL
MCH RBC QN AUTO: 25.9 PG
MCHC RBC AUTO-ENTMCNC: 32.1 G/DL
MCV RBC AUTO: 81 FL
MITRAL VALVE REGURGITATION: ABNORMAL
MONOCYTES # BLD AUTO: 0.5 K/UL
MONOCYTES NFR BLD: 10.5 %
NEUTROPHILS # BLD AUTO: 2.4 K/UL
NEUTROPHILS NFR BLD: 55.4 %
OB PNL STL: POSITIVE
PHOSPHATE SERPL-MCNC: 5.2 MG/DL
PLATELET # BLD AUTO: 234 K/UL
PMV BLD AUTO: 9.5 FL
POTASSIUM SERPL-SCNC: 4.9 MMOL/L
RBC # BLD AUTO: 2.97 M/UL
RETIRED EF AND QEF - SEE NOTES: 50 (ref 55–65)
SATURATED IRON: 13 %
SODIUM SERPL-SCNC: 137 MMOL/L
TOTAL IRON BINDING CAPACITY: 250 UG/DL
TRANSFERRIN SERPL-MCNC: 169 MG/DL
TRICUSPID VALVE REGURGITATION: ABNORMAL
TSH SERPL DL<=0.005 MIU/L-ACNC: 0.89 UIU/ML
WBC # BLD AUTO: 4.37 K/UL

## 2018-04-02 PROCEDURE — 84100 ASSAY OF PHOSPHORUS: CPT

## 2018-04-02 PROCEDURE — 25000003 PHARM REV CODE 250: Performed by: NURSE PRACTITIONER

## 2018-04-02 PROCEDURE — 63600175 PHARM REV CODE 636 W HCPCS: Performed by: NURSE PRACTITIONER

## 2018-04-02 PROCEDURE — 36415 COLL VENOUS BLD VENIPUNCTURE: CPT

## 2018-04-02 PROCEDURE — 83540 ASSAY OF IRON: CPT

## 2018-04-02 PROCEDURE — 85025 COMPLETE CBC W/AUTO DIFF WBC: CPT

## 2018-04-02 PROCEDURE — 83735 ASSAY OF MAGNESIUM: CPT

## 2018-04-02 PROCEDURE — 11000001 HC ACUTE MED/SURG PRIVATE ROOM

## 2018-04-02 PROCEDURE — 25000003 PHARM REV CODE 250: Performed by: HOSPITALIST

## 2018-04-02 PROCEDURE — 99233 SBSQ HOSP IP/OBS HIGH 50: CPT | Mod: ,,, | Performed by: HOSPITALIST

## 2018-04-02 PROCEDURE — 80048 BASIC METABOLIC PNL TOTAL CA: CPT

## 2018-04-02 PROCEDURE — 84443 ASSAY THYROID STIM HORMONE: CPT

## 2018-04-02 PROCEDURE — 93306 TTE W/DOPPLER COMPLETE: CPT

## 2018-04-02 RX ADMIN — HYDRALAZINE HYDROCHLORIDE 50 MG: 25 TABLET, FILM COATED ORAL at 09:04

## 2018-04-02 RX ADMIN — SODIUM BICARBONATE 650 MG TABLET 650 MG: at 08:04

## 2018-04-02 RX ADMIN — LEVOTHYROXINE SODIUM 75 MCG: 75 TABLET ORAL at 05:04

## 2018-04-02 RX ADMIN — HYDROXYCHLOROQUINE SULFATE 200 MG: 200 TABLET, FILM COATED ORAL at 09:04

## 2018-04-02 RX ADMIN — HEPARIN SODIUM 5000 UNITS: 5000 INJECTION, SOLUTION INTRAVENOUS; SUBCUTANEOUS at 09:04

## 2018-04-02 RX ADMIN — ACETAMINOPHEN 650 MG: 325 TABLET ORAL at 09:04

## 2018-04-02 RX ADMIN — ACETAMINOPHEN 650 MG: 325 TABLET ORAL at 08:04

## 2018-04-02 RX ADMIN — LATANOPROST 1 DROP: 50 SOLUTION OPHTHALMIC at 08:04

## 2018-04-02 RX ADMIN — HYDRALAZINE HYDROCHLORIDE 50 MG: 25 TABLET, FILM COATED ORAL at 05:04

## 2018-04-02 RX ADMIN — HEPARIN SODIUM 5000 UNITS: 5000 INJECTION, SOLUTION INTRAVENOUS; SUBCUTANEOUS at 01:04

## 2018-04-02 RX ADMIN — HYDROXYCHLOROQUINE SULFATE 200 MG: 200 TABLET, FILM COATED ORAL at 08:04

## 2018-04-02 RX ADMIN — HYDRALAZINE HYDROCHLORIDE 50 MG: 25 TABLET, FILM COATED ORAL at 01:04

## 2018-04-02 RX ADMIN — CARVEDILOL 25 MG: 12.5 TABLET, FILM COATED ORAL at 08:04

## 2018-04-02 RX ADMIN — HYDRALAZINE HYDROCHLORIDE 10 MG: 20 INJECTION INTRAMUSCULAR; INTRAVENOUS at 05:04

## 2018-04-02 RX ADMIN — ROSUVASTATIN CALCIUM 10 MG: 10 TABLET, FILM COATED ORAL at 08:04

## 2018-04-02 RX ADMIN — FAMOTIDINE 20 MG: 20 TABLET, FILM COATED ORAL at 09:04

## 2018-04-02 RX ADMIN — CITALOPRAM HYDROBROMIDE 30 MG: 20 TABLET ORAL at 09:04

## 2018-04-02 RX ADMIN — SODIUM BICARBONATE 650 MG TABLET 650 MG: at 09:04

## 2018-04-02 RX ADMIN — HEPARIN SODIUM 5000 UNITS: 5000 INJECTION, SOLUTION INTRAVENOUS; SUBCUTANEOUS at 05:04

## 2018-04-02 RX ADMIN — ASPIRIN 81 MG: 81 TABLET, COATED ORAL at 09:04

## 2018-04-02 RX ADMIN — CARVEDILOL 25 MG: 12.5 TABLET, FILM COATED ORAL at 09:04

## 2018-04-02 NOTE — ASSESSMENT & PLAN NOTE
Patient discharged on 3/28/2018 with a creatinine of 1.6 mg/dL and presented now with 2.9 mg/dL and with hyperkalemia.  With holding spironolactone and giving gentle fluid challenge kidney function and hyperkalemia improved.  Will discussed with nephrology regarding additional fluid challenge verus diuretic therapy.

## 2018-04-02 NOTE — ASSESSMENT & PLAN NOTE
Chronic microscopic anemia.  Likely due to occult blood loss and also due to chronic kidney disease.  Needs endoscopic evaluation.

## 2018-04-02 NOTE — SUBJECTIVE & OBJECTIVE
Interval History: No acute events overnight.    Review of Systems   Constitutional: Negative for chills and fever.   Respiratory: Negative for shortness of breath and wheezing.    Cardiovascular: Negative for chest pain.   Gastrointestinal: Negative for abdominal distention, abdominal pain, constipation, diarrhea, nausea and vomiting.   Genitourinary: Negative for dysuria and frequency.   Musculoskeletal: Negative for arthralgias and myalgias.   Neurological: Negative for light-headedness.   Psychiatric/Behavioral: Negative for agitation and confusion.     Objective:     Vital Signs (Most Recent):  Temp: 98.7 °F (37.1 °C) (04/02/18 0821)  Pulse: 65 (04/02/18 1101)  Resp: 18 (04/02/18 0821)  BP: (!) 202/88 (04/02/18 1101)  SpO2: 97 % (04/02/18 0821) Vital Signs (24h Range):  Temp:  [98.1 °F (36.7 °C)-98.7 °F (37.1 °C)] 98.7 °F (37.1 °C)  Pulse:  [63-74] 65  Resp:  [18] 18  SpO2:  [97 %-99 %] 97 %  BP: (148-215)/(70-93) 202/88     Weight: 53.7 kg (118 lb 6.2 oz)  Body mass index is 19.7 kg/m².    Intake/Output Summary (Last 24 hours) at 04/02/18 1102  Last data filed at 04/02/18 0500   Gross per 24 hour   Intake              120 ml   Output             1527 ml   Net            -1407 ml      Physical Exam   Constitutional: She is oriented to person, place, and time. She appears well-developed and well-nourished. No distress.   HENT:   Head: Atraumatic.   Eyes: Conjunctivae are normal.   Neck: Neck supple.   Cardiovascular: Normal rate, regular rhythm and normal heart sounds.    No murmur heard.  Pulmonary/Chest: Effort normal and breath sounds normal. She has no wheezes.   Abdominal: Soft. Bowel sounds are normal. She exhibits no distension. There is no tenderness.   Musculoskeletal: Normal range of motion. She exhibits no edema or deformity.   Neurological: She is alert and oriented to person, place, and time.       Significant Labs: All pertinent labs within the past 24 hours have been reviewed.    Significant  Imaging: I have reviewed all pertinent imaging results/findings within the past 24 hours.

## 2018-04-02 NOTE — PLAN OF CARE
Problem: Patient Care Overview  Goal: Plan of Care Review  Outcome: Ongoing (interventions implemented as appropriate)  Remains free from fall, injury, and skin breakdown. Ambulates independently to bathroom. Parsons care performed. Elevated BP noted; otherwise stable on RA and afebrile. Telemetry maintained. Positions self independently. Up in chair at this shift. Tolerating ordered diet. IV site WNL. Plan of care reviewed with patient and all questions answered. Bed low, locked w/ bed alarm on. Call light within reach. Purposeful rounding performed. No other complaints at this time.

## 2018-04-02 NOTE — PLAN OF CARE
Problem: Patient Care Overview  Goal: Plan of Care Review  Outcome: Outcome(s) achieved Date Met: 04/02/18  Patient has a dx of MARYAM. AAOx4, on RA, Parsons in place. No reports of visual hallucinations during this shift. BP has remained a challenge to control - prn Hydralazine 10 mg given twice, in addition to scheduled medications. No c/o pain or n/v. Up ad shayla to bathroom.

## 2018-04-02 NOTE — PROGRESS NOTES
Progress Note  [facility]    Admit Date: 4/1/2018   LOS: 1 day     SUBJECTIVE:     Follow-up For:      Interval History:     No changes O/N    Review of Systems:  Constitutional: No fever or chills  Respiratory: No cough or shortness of breath  Cardiovascular: No chest pain or palpitations  Gastrointestinal: No nausea or vomiting  Neurological: No confusion or weakness    OBJECTIVE:     Vital Signs Range (Last 24H):  Vitals:    04/02/18 1101   BP: (!) 202/88   Pulse: 65   Resp:    Temp:        Temp:  [98.1 °F (36.7 °C)-98.7 °F (37.1 °C)]   Pulse:  [64-74]   Resp:  [18]   BP: (148-215)/(70-93)   SpO2:  [97 %-99 %]     I & O (Last 24H):  Intake/Output Summary (Last 24 hours) at 04/02/18 1148  Last data filed at 04/02/18 0500   Gross per 24 hour   Intake              120 ml   Output             1452 ml   Net            -1332 ml       Physical Exam:  General appearance: Well developed, well nourished  Eyes:  Conjunctivae/corneas clear. PERRL.  Lungs: Normal respiratory effort,   clear to auscultation bilaterally   Heart: Regular rate and rhythm, S1, S2 normal, no murmur, rub or ruma.  Abdomen: Soft, non-tender non-distended; bowel sounds normal; no masses,  no organomegaly  Extremities: No cyanosis or clubbing. No edema.  DP pulses 2+/4+ bilaterally.  Skin: Skin color, texture, turgor normal. No rashes or lesions  Neurologic: Normal strength and tone. No focal numbness or weakness     Laboratory Data:      Recent Labs  Lab 04/02/18  0446      K 4.9   *   CO2 19*   BUN 47*   CREATININE 2.6*   CALCIUM 7.9*   PHOS 5.2*     Lab Results   Component Value Date    .0 (H) 11/21/2016    CALCIUM 7.9 (L) 04/02/2018    PHOS 5.2 (H) 04/02/2018     Lab Results   Component Value Date    IRON 32 04/02/2018    TIBC 250 04/02/2018    FERRITIN 202 11/29/2017       Medications:  Medication list was reviewed and changes noted under Assessment/Plan.    Diagnostic Results:    Reviewed most recent  CT/US/Echo/MRI    ASSESSMENT/PLAN:     Hallucinations  -patient thinks it may be due to clonidine. Hold for now     MARYAM on CKD IV (Baseline variable 1.8) likely due to CHF? (N17.9, N18.4,):  [place] [facility] [place] but she claims she has switched to Dr. Brizuela.  Prior renal arterial doppler with some evidence of FRAN but repeat US reviewed by Dr. Lea: does not show significant stenosis last month.  -Cr is now above baseline and BP un uncontrolled.  -renal US without hydro  Plan  -hold aldactone  -agree with lasix given BNP/edema. Hold fluids.  -need echo. Nl ef 11/2017  -need to consult Dr. Lae. I think Angiogram and stent of kidney may be needed if not in CHF.     Metabolic acidosis  -due to MARYAM? Vs SLE vs other  -improving on po bicarb     Edema  -may be due to CCB  or CHF? BNP is elevated. No significant proteinuria. Doubt due to SLE nephropathy.  -defer lasix to Dr. Lea.     Hyperkalemia:improving  -hold aldactone  -lasix will help  -avoid nsaids     HTN  -may need to d/c Coreg given bradycardia  -hydralazine PRN  -await cards recs. No ace/arb given MARYAM     H/o Bradycardia (R00.1):  Pt was on high dose labetolol, and developed evelyn (HR 30-40s).  Held agents and HR improved  to 80's- 100's.  evelyn on coreg on admit     1o Hyperaldosteronism? (E26.9):  I trust that Dr. Epperson has done appropriate work up. May restart aldactone once renal fnc is back to nl. Once more stable, this may be a good time to recheck cydney/renin     SLE (M32.9):  Continue plaquenil.  Defer. Anti-histone negative.     Anemia: may be due to sle vs other. Defer to hospitalist for w/u. Iron is low. Stool positive for blood     Thank you for allowing us to participate in the care of your patient. We will follow the patient and provide recommendations as needed.

## 2018-04-02 NOTE — HOSPITAL COURSE
Patient is a 72 year-old woman with hypertension, hyperaldosteronism, and chronic kidney disease stage IV who was recently discharged for the hospital following multiple days of adjustments with her antihypertensive regimen with goal of achieving reasonable blood pressure control along with stable kidney function.  Patient's serum creatinine had been stable (1.6 mg/dL on discharge) but she returned to the emergency department with shortness of breath and also visual hallucinations which she attributes to clonidine.  Laboratory studies reveal acute kidney injury (serum creatinine 2.9 mg/dL) along with hyperkalemia (5.4 mmol/L.  Improving with fluid challenge.  Nephrology consulted.    Eventually resumed Procardia 60 mg and BP improved.  Hyperkalemia improved with sodium bicarbonate which was also improving metabolic acidosis.  Will follow up with nephrology.     See below for details.

## 2018-04-02 NOTE — PROGRESS NOTES
[facility] Hospital Medicine  Progress Note    Patient Name: Ewelina Arnold  MRN: [id number]  Patient Class: IP- Inpatient   Admission Date: 4/1/2018  Length of Stay: 1 days  Attending Physician: Louis Vergara MD  Primary Care Provider: Kalie Walton MD        Subjective:     Principal Problem:Acute kidney injury superimposed on chronic kidney disease    HPI:  This is a 72 y.o. Female, with a history of CKD, CHF, and Lupus, who presents with complaint of shortness of breath that began two days ago. Patient was admitted from 3/19 to 3/28 for MARYAM and emergent hypertension. She reports leg swelling that has worsened since discharge. She reports associated abdominal distension and decreased urination. She reports shortness of breath worsens with exertion. She denies fever, chills, cough, chest pain, dysuria, urinary urgency, or urinary frequency. Patient states she only has one kidney because she donated the other.     Hospital Course:  Patient is a 72 year-old woman with hypertension, hyperaldosteronism, and chronic kidney disease stage IV who was recently discharged for the hospital following multiple days of adjustments with her antihypertensive regimen with goal of achieving reasonable blood pressure control along with stable kidney function.  Patient's serum creatinine had been stable (1.6 mg/dL on discharge) but she returned to the emergency department with shortness of breath and also visual hallucinations which she attributes to clonidine.  Laboratory studies reveal acute kidney injury (serum creatinine 2.9 mg/dL) along with hyperkalemia (5.4 mmol/L.  Improving with fluid challenge.  Nephrology consulted.    Interval History: No acute events overnight.    Review of Systems   Constitutional: Negative for chills and fever.   Respiratory: Negative for shortness of breath and wheezing.    Cardiovascular: Negative for chest pain.   Gastrointestinal: Negative for abdominal distention,  abdominal pain, constipation, diarrhea, nausea and vomiting.   Genitourinary: Negative for dysuria and frequency.   Musculoskeletal: Negative for arthralgias and myalgias.   Neurological: Negative for light-headedness.   Psychiatric/Behavioral: Negative for agitation and confusion.     Objective:     Vital Signs (Most Recent):  Temp: 98.7 °F (37.1 °C) (04/02/18 0821)  Pulse: 65 (04/02/18 1101)  Resp: 18 (04/02/18 0821)  BP: (!) 202/88 (04/02/18 1101)  SpO2: 97 % (04/02/18 0821) Vital Signs (24h Range):  Temp:  [98.1 °F (36.7 °C)-98.7 °F (37.1 °C)] 98.7 °F (37.1 °C)  Pulse:  [63-74] 65  Resp:  [18] 18  SpO2:  [97 %-99 %] 97 %  BP: (148-215)/(70-93) 202/88     Weight: 53.7 kg (118 lb 6.2 oz)  Body mass index is 19.7 kg/m².    Intake/Output Summary (Last 24 hours) at 04/02/18 1102  Last data filed at 04/02/18 0500   Gross per 24 hour   Intake              120 ml   Output             1527 ml   Net            -1407 ml      Physical Exam   Constitutional: She is oriented to person, place, and time. She appears well-developed and well-nourished. No distress.   HENT:   Head: Atraumatic.   Eyes: Conjunctivae are normal.   Neck: Neck supple.   Cardiovascular: Normal rate, regular rhythm and normal heart sounds.    No murmur heard.  Pulmonary/Chest: Effort normal and breath sounds normal. She has no wheezes.   Abdominal: Soft. Bowel sounds are normal. She exhibits no distension. There is no tenderness.   Musculoskeletal: Normal range of motion. She exhibits no edema or deformity.   Neurological: She is alert and oriented to person, place, and time.       Significant Labs: All pertinent labs within the past 24 hours have been reviewed.    Significant Imaging: I have reviewed all pertinent imaging results/findings within the past 24 hours.    Assessment/Plan:      * Acute kidney injury superimposed on chronic kidney disease stage IV    Patient discharged on 3/28/2018 with a creatinine of 1.6 mg/dL and presented now with 2.9  mg/dL and with hyperkalemia.  With holding spironolactone and giving gentle fluid challenge kidney function and hyperkalemia improved.  Will discussed with nephrology regarding additional fluid challenge verus diuretic therapy.        Hypertensive crisis    Difficult to control.  Patient reports hallucinations with clonidine.  Will add long-acting nitrates in addition to hydralazine.  Will discuss with nephrology.        Hyperaldosteronism    Unable to tolerate spirolactone.  Hold off on spirolactone for now.        Chronic diastolic congestive heart failure    Despite elevated BNP, does not appear acute decompensated.  Continue with medical therapy as tolerated.            Hypothyroidism    TSH at gao.  Continue levothyroxine.          Dyslipidemia    Continue with statin therapy.        Microcytic anemia    Chronic microscopic anemia.  Likely due to occult blood loss and also due to chronic kidney disease.  Needs endoscopic evaluation.          VTE Risk Mitigation         Ordered     heparin (porcine) injection 5,000 Units  Every 8 hours     Route:  Subcutaneous        04/01/18 0527     Place NADER hose  Until discontinued      04/01/18 0527     Place sequential compression device  Until discontinued      04/01/18 0527     IP VTE HIGH RISK PATIENT  Once      04/01/18 0527              Louis Vergara MD  Department of Hospital Medicine   [facility]

## 2018-04-02 NOTE — CONSULTS
[facility]  Cardiology  Consult Note    Patient Name: Ewelina Arnold  MRN: [id number]  Admission Date: 4/1/2018  Hospital Length of Stay: 1 days  Code Status: Full Code   Attending Provider: Louis Vergara MD   Consulting Provider: Risa Carmichael MD  Primary Care Physician: Kalie Walton MD  Principal Problem:Acute kidney injury superimposed on chronic kidney disease    Patient information was obtained from patient and past medical records.     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 She developed lupus in 1994. Lately accelerated hypertension and rise in BUN/crea. On 1/18/2018 she had a Renal Duplex that did not reveal any increased velocity in the right renal artery but possibly dampened velocities more distally. She had a repeat Renal Duplex on 3/20/2018 that reveled a similar peak velocity in the right renal artery of 0.64 m/s. I was asked to see her at that time for the possibility of FRAN and advised against angiography as I felt FRAN was unlikely. She hasd a lengthy stay and was discharged 3/28/2018. She comes back in due to worsening dyspnea with some edema of her legs. Her renal function has worsened.      Past Medical History:   Diagnosis Date    Acute on chronic diastolic congestive heart failure 11/17/2017    Allergy     Anatomical narrow angle glaucoma     Anemia     States diagnosed about a month ago    Aortic atherosclerosis     Arthritis     Cataract     CHF (congestive heart failure)     Chronic kidney disease     Chronic sciatica of left side     Right lower back pain due to sciatica    Coronary artery disease     Depression     Dry eyes     GERD (gastroesophageal reflux  disease)     History of colon cancer     Hyperaldosteronism     Hyperlipidemia     Hypertension     Hypothyroidism     Left ventricular diastolic dysfunction with preserved systolic function     Lupus (systemic lupus erythematosus) 8/17/2012    Malnutrition 5/16/2017    Osteoarthritis of cervical spine 8/17/2012    Osteopenia     PAD (peripheral artery disease)     FRAN (renal artery stenosis)        Past Surgical History:   Procedure Laterality Date    CARDIAC CATHETERIZATION  07/27/2011    x1    CHOLECYSTECTOMY  1999    COLON SURGERY  2000 & 2003    partial removal    COLONOSCOPY N/A 4/14/2016    Procedure: COLONOSCOPY;  Surgeon: Jose Steen MD;  Location: [facility] ENDO (4TH FLR);  Service: Endoscopy;  Laterality: N/A;  prep 2 days prior light meals only/clear liquid day before  and 4 dulcolax tabs (5 mgs) at noon    COLONOSCOPY N/A 9/14/2017    Procedure: COLONOSCOPY;  Surgeon: Jose Steen MD;  Location: [facility] [employer] (4TH FLR);  Service: Endoscopy;  Laterality: N/A;    EYE SURGERY      HAND SURGERY Bilateral 1996 & 1997    due to carpal tunnel     HERNIA REPAIR      HYSTERECTOMY  1983    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    Procardia [nifedipine] Nausea Only and Edema     Feet swelling and nausea, now reports that this has happened in the past and required lasix    Lactose     Arthrotec 50 [diclofenac-misoprostol]      Unknown    Bentyl [dicyclomine]      Not effective    Doxycycline Nausea Only    Imdur  [isosorbide mononitrate] Nausea Only    Lomotil [diphenoxylate-atropine] Nausea And Vomiting     Stomach cramps with vomitting    Lozol [indapamide] Rash    Norvasc [amlodipine]      Not tolerated    Tramadol Nausea Only       No current facility-administered medications on file prior to encounter.      Current Outpatient Prescriptions on File Prior to Encounter   Medication Sig    acetaminophen (TYLENOL) 325 MG tablet Take 2 tablets (650 mg total) by mouth every 6 (six) hours.    aspirin (ECOTRIN) 81 MG EC tablet Take 1 tablet (81 mg total) by mouth once daily.    carvedilol (COREG) 25 MG tablet Take 1 tablet (25 mg total) by mouth 2 (two) times daily.    citalopram (CELEXA) 20 MG tablet Take 1.5 tablets (30 mg total) by mouth once daily.    cloNIDine (CATAPRES) 0.1 MG tablet Take 1 tablet (0.1 mg total) by mouth 2 (two) times daily.    famotidine (PEPCID) 20 MG tablet Take 1 tablet (20 mg total) by mouth once daily.    hydroxychloroquine (PLAQUENIL) 200 mg tablet TAKE 1 TABLET TWICE DAILY    latanoprost 0.005 % ophthalmic solution INSTILL 1 DROP INTO BOTH EYES EVERY DAY    levothyroxine (SYNTHROID) 75 MCG tablet Take 1 tablet (75 mcg total) by mouth before breakfast.    NIFEdipine (PROCARDIA-XL) 90 MG (OSM) 24 hr tablet Take 1 tablet (90 mg total) by mouth once daily.    rosuvastatin (CRESTOR) 10 MG tablet Take 1 tablet (10 mg total) by mouth every evening.    sodium bicarbonate 650 MG tablet Take 1 tablet (650 mg total) by mouth 2 (two) times daily.    spironolactone (ALDACTONE) 50 MG tablet Take 1 tablet (50 mg total) by mouth 2 (two) times daily.     Family History     Problem Relation (Age of Onset)    Diabetes Maternal Grandmother, Son, Maternal Aunt    Glaucoma Maternal Grandmother    Heart attack Mother    Heart disease Father    Hypertension Mother, Father, Sister, Brother    Kidney disease Brother    Kidney failure Brother    No Known Problems Daughter, Maternal Grandfather, Paternal  Grandmother, Paternal Grandfather        Social History Main Topics    Smoking status: Former Smoker     Packs/day: 1.50     Years: 30.00     Types: Cigarettes     Start date: 1955     Quit date: 3/13/1985    Smokeless tobacco: Never Used    Alcohol use No    Drug use: No    Sexual activity: Not Currently     Partners: Male     Birth control/ protection: Abstinence     Review of Systems   Constitution: Positive for weakness and malaise/fatigue. Negative for chills and fever.   HENT: Negative for nosebleeds.    Eyes: Negative for double vision, vision loss in left eye and vision loss in right eye.   Cardiovascular: Positive for dyspnea on exertion, leg swelling and orthopnea. Negative for chest pain, claudication, irregular heartbeat, near-syncope, palpitations, paroxysmal nocturnal dyspnea and syncope.   Respiratory: Positive for shortness of breath. Negative for cough, hemoptysis and wheezing.    Endocrine: Negative for cold intolerance and heat intolerance.   Hematologic/Lymphatic: Negative for bleeding problem. Does not bruise/bleed easily.   Skin: Negative for color change and rash.   Musculoskeletal: Negative for back pain, falls, muscle weakness and myalgias.   Gastrointestinal: Negative for heartburn, hematemesis, hematochezia, hemorrhoids, jaundice, melena, nausea and vomiting.   Genitourinary: Negative for dysuria and hematuria.   Neurological: Negative for dizziness, focal weakness, headaches, light-headedness, loss of balance, numbness and vertigo.   Psychiatric/Behavioral: Negative for altered mental status, depression and memory loss. The patient is nervous/anxious.    Allergic/Immunologic: Negative for hives and persistent infections.     Objective:     Vital Signs (Most Recent):  Temp: 98.3 °F (36.8 °C) (04/02/18 1535)  Pulse: 62 (04/02/18 1600)  Resp: 18 (04/02/18 1535)  BP: (!) 198/81 (04/02/18 1535)  SpO2: 99 % (04/02/18 1535) Vital Signs (24h Range):  Temp:  [98.1 °F (36.7 °C)-98.7 °F (37.1  °C)] 98.3 °F (36.8 °C)  Pulse:  [62-74] 62  Resp:  [18] 18  SpO2:  [97 %-99 %] 99 %  BP: (185-215)/(81-93) 198/81     Weight: 53.7 kg (118 lb 6.2 oz)  Body mass index is 19.7 kg/m².    SpO2: 99 %  O2 Device (Oxygen Therapy): room air      Intake/Output Summary (Last 24 hours) at 04/02/18 1753  Last data filed at 04/02/18 1714   Gross per 24 hour   Intake              720 ml   Output             1951 ml   Net            -1231 ml       Lines/Drains/Airways     Drain                 Urethral Catheter 04/01/18 1235 Straight-tip 16 Fr. 1 day          Peripheral Intravenous Line                 Peripheral IV - Single Lumen 04/01/18 0215 Left Antecubital 1 day                Physical Exam   Constitutional: She is oriented to person, place, and time. She appears well-developed and well-nourished.  Non-toxic appearance. She appears ill. No distress.   HENT:   Head: Normocephalic and atraumatic.   Nose: Nose normal.   Eyes: Right eye exhibits no discharge. Left eye exhibits no discharge. Right conjunctiva is not injected. Left conjunctiva is not injected. Right pupil is round. Left pupil is round. Pupils are equal.   Neck: Neck supple. No JVD present. Carotid bruit is not present. No thyromegaly present.   Cardiovascular: Normal rate, regular rhythm, S1 normal and S2 normal.  PMI is not displaced.  Exam reveals gallop and S4.    Murmur heard.   Harsh midsystolic murmur is present with a grade of 3/6  at the upper right sternal border  Pulses:       Radial pulses are 2+ on the right side, and 2+ on the left side.        Femoral pulses are 2+ on the right side, and 2+ on the left side.       Dorsalis pedis pulses are 0 on the right side, and 0 on the left side.        Posterior tibial pulses are 2+ on the right side, and 0 on the left side.   Pulmonary/Chest: Effort normal and breath sounds normal.   Abdominal: Soft. Normal appearance. There is no hepatosplenomegaly. There is no tenderness.   Musculoskeletal:        Right  ankle: She exhibits no swelling, no ecchymosis and no deformity.        Left ankle: She exhibits no swelling, no ecchymosis and no deformity.   Lymphadenopathy:        Head (right side): No submandibular adenopathy present.        Head (left side): No submandibular adenopathy present.     She has no cervical adenopathy.   Neurological: She is alert and oriented to person, place, and time. She is not disoriented. No cranial nerve deficit or sensory deficit.   Skin: Skin is warm, dry and intact. No rash noted. She is not diaphoretic. Nails show no clubbing.   Psychiatric: She has a normal mood and affect. Her speech is normal and behavior is normal. Judgment and thought content normal. Cognition and memory are normal.       Current Medications:     aspirin  81 mg Oral Daily    carvedilol  25 mg Oral BID    citalopram  30 mg Oral Daily    famotidine  20 mg Oral Daily    heparin (porcine)  5,000 Units Subcutaneous Q8H    hydrALAZINE  50 mg Oral Q8H    hydroxychloroquine  200 mg Oral BID    latanoprost  1 drop Both Eyes QHS    levothyroxine  75 mcg Oral Before breakfast    rosuvastatin  10 mg Oral QHS    sodium bicarbonate  650 mg Oral BID     Current Laboratory Results:    Recent Results (from the past 24 hour(s))   Occult blood x 1, stool    Collection Time: 04/01/18  8:35 PM   Result Value Ref Range    Occult Blood Positive (A) Negative   TSH    Collection Time: 04/02/18  4:46 AM   Result Value Ref Range    TSH 0.885 0.400 - 4.000 uIU/mL   Magnesium    Collection Time: 04/02/18  4:46 AM   Result Value Ref Range    Magnesium 1.7 1.6 - 2.6 mg/dL   Phosphorus    Collection Time: 04/02/18  4:46 AM   Result Value Ref Range    Phosphorus 5.2 (H) 2.7 - 4.5 mg/dL   CBC with Automated Differential    Collection Time: 04/02/18  4:46 AM   Result Value Ref Range    WBC 4.37 3.90 - 12.70 K/uL    RBC 2.97 (L) 4.00 - 5.40 M/uL    Hemoglobin 7.7 (L) 12.0 - 16.0 g/dL    Hematocrit 24.0 (L) 37.0 - 48.5 %    MCV 81 (L) 82 -  98 fL    MCH 25.9 (L) 27.0 - 31.0 pg    MCHC 32.1 32.0 - 36.0 g/dL    RDW 14.6 (H) 11.5 - 14.5 %    Platelets 234 150 - 350 K/uL    MPV 9.5 9.2 - 12.9 fL    Gran # (ANC) 2.4 1.8 - 7.7 K/uL    Lymph # 1.4 1.0 - 4.8 K/uL    Mono # 0.5 0.3 - 1.0 K/uL    Eos # 0.1 0.0 - 0.5 K/uL    Baso # 0.01 0.00 - 0.20 K/uL    Gran% 55.4 38.0 - 73.0 %    Lymph% 32.5 18.0 - 48.0 %    Mono% 10.5 4.0 - 15.0 %    Eosinophil% 1.4 0.0 - 8.0 %    Basophil% 0.2 0.0 - 1.9 %    Differential Method Automated    Basic metabolic panel    Collection Time: 04/02/18  4:46 AM   Result Value Ref Range    Sodium 137 136 - 145 mmol/L    Potassium 4.9 3.5 - 5.1 mmol/L    Chloride 111 (H) 95 - 110 mmol/L    CO2 19 (L) 23 - 29 mmol/L    Glucose 73 70 - 110 mg/dL    BUN, Bld 47 (H) 8 - 23 mg/dL    Creatinine 2.6 (H) 0.5 - 1.4 mg/dL    Calcium 7.9 (L) 8.7 - 10.5 mg/dL    Anion Gap 7 (L) 8 - 16 mmol/L    eGFR if African American 20 (A) >60 mL/min/1.73 m^2    eGFR if non African American 18 (A) >60 mL/min/1.73 m^2   Iron and TIBC    Collection Time: 04/02/18  4:46 AM   Result Value Ref Range    Iron 32 30 - 160 ug/dL    Transferrin 169 (L) 200 - 375 mg/dL    TIBC 250 250 - 450 ug/dL    Saturated Iron 13 (L) 20 - 50 %   2D echo with color flow doppler    Collection Time: 04/02/18  2:18 PM   Result Value Ref Range    EF 50 55 - 65    Mitral Valve Regurgitation MILD TO MODERATE     Diastolic Dysfunction Yes (A)     Aortic Valve Regurgitation MILD     Aortic Valve Stenosis MODERATE TO SEVERE (A)     Est. PA Systolic Pressure 52.36 (A)     Pericardial Effusion TRIVIAL     Tricuspid Valve Regurgitation MILD      Current Imaging Results:    Imaging Results          X-Ray Chest 1 View (Final result)  Result time 04/01/18 02:43:29    Final result by Miguel Angel Mendes MD (04/01/18 02:43:29)                 Impression:      Stable cardiomegaly.  Stable trace residual right pleural fluid and/or pleural thickening extending into the base of the major fissure.    No  significant change from prior study.      Electronically signed by: Miguel Angel Mendes MD  Date:    04/01/2018  Time:    02:43             Narrative:    EXAMINATION:  XR CHEST 1 VIEW    CLINICAL HISTORY:  Other forms of dyspnea    TECHNIQUE:  Single frontal view of the chest was performed.    COMPARISON:  January 15, 2018    FINDINGS:  Stable cardiomegaly.  Stable trace residual right pleural fluid and/or pleural thickening extending into the base of the major fissure.    Heart and lungs  appear unchanged when allowing for differences in technique and positioning.                                Date of Procedure: 04/02/2018    TEST DESCRIPTION   Technical Quality: This is a technically adequate study.     Aorta: The aortic root is normal in size, measuring 1.9 cm at sinotubular junction and 2.5 cm at Sinuses of Valsalva.     Left Atrium: The left atrial volume index is severely enlarged, measuring 58.91 cc/m2.     Left Ventricle: The left ventricle is normal in size, with an end-diastolic diameter of 4.2 cm, and an end-systolic diameter of 2.8 cm. Wall thickness is markedly increased, with the septum and the posterior wall each measuring 1.6 cm across. Relative   wall thickness was increased at 0.76, and the LV mass index was increased at 209.4 g/m2 consistent with concentric left ventricular hypertrophy. There are no regional wall motion abnormalities. Left ventricular systolic function appears low normal to   mildly depressed. Visually estimated ejection fraction is 50-55%. The LV Doppler derived stroke volume equals 57.0 ccs.     Diastolic indices: E wave velocity 1.1 m/s, E/A ratio 1.7,  msec., E/e' ratio(avg) 15. There is pseudonormalization of mitral inflow pattern consistent with significant diastolic dysfunction.     Right Atrium: The right atrium is moderately enlarged, measuring 6.7 cm in length and 3.9 cm in width in the apical view.     Right Ventricle: The right ventricle is normal in size. Global  right ventricular systolic function appears normal. There is abnormal septal motion consistent with RV pressure/volume overload. The estimated PA systolic pressure is 52 mmHg.     Aortic Valve:  The aortic valve is moderately sclerotic with moderately restricted leaflet mobility. The aortic valve is tri-leaflet in structure. The peak velocity obtained across the aortic valve is 3.27 m/s, which translates to a peak gradient of 43   mmHg. The mean gradient is 16 mmHg. Using a left ventricular outflow tract diameter of 2.0 cm, a left ventricular outflow tract velocity time integral of 18 cm, and a peak instantaneous transvalvular velocity time integral of 60 cm, the calculated aortic   valve area is 0.94 cm2(AVAi is 0.59 cm2/m2), consistent with moderate to severe aortic stenosis. Additionally, there is mild aortic regurgitation.     Mitral Valve:  The mitral valve is mildly sclerotic. There is mild to moderate mitral regurgitation.     Tricuspid Valve:  The tricuspid valve is normal in structure. There is mild tricuspid regurgitation.     Pulmonary Valve:  The pulmonic valve is normal in structure.     Pericardium: There is evidence of a trivial circumferential pericardial effusion.     IVC: IVC is enlarged but collapses > 50% with a sniff, suggesting intermediate right atrial pressure of 8 mmHg.     Intracavitary: There is no evidence of intracavitary mass or thrombus. There is no evidence of vegetation.     CONCLUSIONS     1 - Concentric hypertrophy.     2 - Low normal to mildly depressed left ventricular systolic function (EF 50-55%).     3 - No wall motion abnormalities.     4 - Impaired LV relaxation, elevated LAP (grade 2 diastolic dysfunction).     5 - Biatrial enlargement.     6 - Moderate to severe aortic stenosis, PABLITO = 0.94 cm2, AVAi = 0.59 cm2/m2, peak velocity = 3.27 m/s, mean gradient = 16 mmHg.     7 - Mild aortic regurgitation.     8 - Mild to moderate mitral regurgitation.     9 - Pulmonary  hypertension. The estimated PA systolic pressure is 52 mmHg.     10 - Trivial pericardial effusion.     This document has been electronically    SIGNED BY: Risa Lea MD On: 04/02/2018 15:51    Assessment and Plan:     Active Diagnoses:    Diagnosis Date Noted POA    PRINCIPAL PROBLEM:  Acute kidney injury superimposed on chronic kidney disease stage IV [N17.9, N18.9] 01/21/2018 Yes    Chronic diastolic congestive heart failure [I50.32] 03/21/2018 Yes    Hyperaldosteronism [E26.9] 03/21/2018 Yes    Hypertensive crisis [I16.9] 03/19/2018 Yes    Microcytic anemia [D50.9] 10/16/2017 Yes    Dyslipidemia [E78.5]  Yes    Hypothyroidism [E03.9] 05/13/2015 Yes      Problems Resolved During this Admission:    Diagnosis Date Noted Date Resolved POA     Assessment and Plan:    1. Chronic Kidney Disease, Stage 4   4/2/2018: BUN/crea 47/2.6. CrCl 20.   Appears to be major issue.    2. Heart Failure, Diastolic, Acute on Chronic   4/2/2018: Normal left ventricular size with low normal to mildly depressed systolic function. EF 50-55%. Severe LVH. Moderate diastolic dysfunction. Moderate to severe AS - 3.3 m/s - 0.9 cm2. SPAP 52 mmHg. Trivial pericardial effusion.    Probably low flow moderate to severe AS with normal EF.   Doubt AS much of a cause for her condition but diastolic dysfunction advanced.     3. Hypertension              Severe and accelerated.              1/18/2018: U/S Renal: Right Renal Artery: Poorly visualized ostium. Peak velocity 0.64 m/s.               3/20/2018: U/S Renal:Right Renal Artery: Peak velocity 0.62 m/s.              That the above two studies do not reveal any increased velocity in the right renal artery makes significant renal artery stenosis as a cause for her severe hypertension and rise in BUN/crea unlikely.              I would not favor angiography.              On carvedilol 25 mg Q12 and hydralazine 50 mg Q8.    4. Hypercholesterolemia   On rosuvastatin 10 mg Q24.      VTE Risk  Mitigation         Ordered     heparin (porcine) injection 5,000 Units  Every 8 hours     Route:  Subcutaneous        04/01/18 0527     Place NADER hose  Until discontinued      04/01/18 0527     Place sequential compression device  Until discontinued      04/01/18 0527     IP VTE HIGH RISK PATIENT  Once      04/01/18 0527          Thank you for your consult.    I will follow-up with patient. Please contact us if you have any additional questions.    Risa Lea MD  Cardiology   [facility]

## 2018-04-02 NOTE — ASSESSMENT & PLAN NOTE
Difficult to control.  Patient reports hallucinations with clonidine.  Will add long-acting nitrates in addition to hydralazine.  Will discuss with nephrology.

## 2018-04-03 ENCOUNTER — TELEPHONE (OUTPATIENT)
Dept: FAMILY MEDICINE | Facility: CLINIC | Age: 73
End: 2018-04-03

## 2018-04-03 PROBLEM — E87.20 METABOLIC ACIDOSIS, NORMAL ANION GAP (NAG): Status: ACTIVE | Noted: 2018-04-03

## 2018-04-03 PROBLEM — N17.9 AKI (ACUTE KIDNEY INJURY): Status: ACTIVE | Noted: 2018-04-03

## 2018-04-03 PROBLEM — Z90.5 HISTORY OF UNILATERAL NEPHRECTOMY: Status: ACTIVE | Noted: 2018-04-03

## 2018-04-03 LAB
ANION GAP SERPL CALC-SCNC: 8 MMOL/L
BASOPHILS # BLD AUTO: 0.01 K/UL
BASOPHILS NFR BLD: 0.3 %
BUN SERPL-MCNC: 49 MG/DL
CALCIUM SERPL-MCNC: 8.4 MG/DL
CHLORIDE SERPL-SCNC: 111 MMOL/L
CO2 SERPL-SCNC: 17 MMOL/L
CREAT SERPL-MCNC: 2.5 MG/DL
DIFFERENTIAL METHOD: ABNORMAL
EOSINOPHIL # BLD AUTO: 0.1 K/UL
EOSINOPHIL NFR BLD: 2.3 %
ERYTHROCYTE [DISTWIDTH] IN BLOOD BY AUTOMATED COUNT: 14.5 %
EST. GFR  (AFRICAN AMERICAN): 21 ML/MIN/1.73 M^2
EST. GFR  (NON AFRICAN AMERICAN): 19 ML/MIN/1.73 M^2
GLUCOSE SERPL-MCNC: 52 MG/DL
HCT VFR BLD AUTO: 24.7 %
HGB BLD-MCNC: 7.9 G/DL
LYMPHOCYTES # BLD AUTO: 1.6 K/UL
LYMPHOCYTES NFR BLD: 38.8 %
MAGNESIUM SERPL-MCNC: 1.7 MG/DL
MCH RBC QN AUTO: 25.6 PG
MCHC RBC AUTO-ENTMCNC: 32 G/DL
MCV RBC AUTO: 80 FL
MONOCYTES # BLD AUTO: 0.6 K/UL
MONOCYTES NFR BLD: 15.8 %
NEUTROPHILS # BLD AUTO: 1.7 K/UL
NEUTROPHILS NFR BLD: 42.5 %
PHOSPHATE SERPL-MCNC: 4.8 MG/DL
PLATELET # BLD AUTO: 241 K/UL
PMV BLD AUTO: 9.8 FL
POTASSIUM SERPL-SCNC: 5.6 MMOL/L
RBC # BLD AUTO: 3.08 M/UL
SODIUM SERPL-SCNC: 136 MMOL/L
TROPONIN I SERPL DL<=0.01 NG/ML-MCNC: 0.19 NG/ML
WBC # BLD AUTO: 4 K/UL

## 2018-04-03 PROCEDURE — 93010 ELECTROCARDIOGRAM REPORT: CPT | Mod: ,,, | Performed by: INTERNAL MEDICINE

## 2018-04-03 PROCEDURE — 25000003 PHARM REV CODE 250: Performed by: INTERNAL MEDICINE

## 2018-04-03 PROCEDURE — 25000003 PHARM REV CODE 250

## 2018-04-03 PROCEDURE — 84484 ASSAY OF TROPONIN QUANT: CPT

## 2018-04-03 PROCEDURE — 63600175 PHARM REV CODE 636 W HCPCS: Performed by: NURSE PRACTITIONER

## 2018-04-03 PROCEDURE — 85025 COMPLETE CBC W/AUTO DIFF WBC: CPT

## 2018-04-03 PROCEDURE — 80048 BASIC METABOLIC PNL TOTAL CA: CPT

## 2018-04-03 PROCEDURE — 83735 ASSAY OF MAGNESIUM: CPT

## 2018-04-03 PROCEDURE — 25000003 PHARM REV CODE 250: Performed by: NURSE PRACTITIONER

## 2018-04-03 PROCEDURE — 20000000 HC ICU ROOM

## 2018-04-03 PROCEDURE — 99233 SBSQ HOSP IP/OBS HIGH 50: CPT | Mod: ,,, | Performed by: HOSPITALIST

## 2018-04-03 PROCEDURE — 99900035 HC TECH TIME PER 15 MIN (STAT)

## 2018-04-03 PROCEDURE — 36415 COLL VENOUS BLD VENIPUNCTURE: CPT

## 2018-04-03 PROCEDURE — 25000003 PHARM REV CODE 250: Performed by: HOSPITALIST

## 2018-04-03 PROCEDURE — 93005 ELECTROCARDIOGRAM TRACING: CPT

## 2018-04-03 PROCEDURE — 84100 ASSAY OF PHOSPHORUS: CPT

## 2018-04-03 RX ORDER — HYDRALAZINE HYDROCHLORIDE 25 MG/1
100 TABLET, FILM COATED ORAL EVERY 8 HOURS
Status: DISCONTINUED | OUTPATIENT
Start: 2018-04-03 | End: 2018-04-05

## 2018-04-03 RX ORDER — NICARDIPINE HYDROCHLORIDE 0.2 MG/ML
INJECTION INTRAVENOUS
Status: COMPLETED
Start: 2018-04-03 | End: 2018-04-03

## 2018-04-03 RX ORDER — HYDRALAZINE HYDROCHLORIDE 25 MG/1
75 TABLET, FILM COATED ORAL EVERY 8 HOURS
Status: DISCONTINUED | OUTPATIENT
Start: 2018-04-03 | End: 2018-04-03

## 2018-04-03 RX ORDER — NICARDIPINE HYDROCHLORIDE 0.2 MG/ML
1 INJECTION INTRAVENOUS CONTINUOUS
Status: DISCONTINUED | OUTPATIENT
Start: 2018-04-03 | End: 2018-04-04

## 2018-04-03 RX ORDER — HYDROCODONE BITARTRATE AND ACETAMINOPHEN 5; 325 MG/1; MG/1
1 TABLET ORAL ONCE
Status: COMPLETED | OUTPATIENT
Start: 2018-04-03 | End: 2018-04-03

## 2018-04-03 RX ORDER — CHLORTHALIDONE 25 MG/1
25 TABLET ORAL ONCE
Status: COMPLETED | OUTPATIENT
Start: 2018-04-03 | End: 2018-04-03

## 2018-04-03 RX ADMIN — CARVEDILOL 25 MG: 12.5 TABLET, FILM COATED ORAL at 09:04

## 2018-04-03 RX ADMIN — HYDRALAZINE HYDROCHLORIDE 100 MG: 25 TABLET ORAL at 02:04

## 2018-04-03 RX ADMIN — LATANOPROST 1 DROP: 50 SOLUTION OPHTHALMIC at 09:04

## 2018-04-03 RX ADMIN — HYDRALAZINE HYDROCHLORIDE 100 MG: 25 TABLET ORAL at 08:04

## 2018-04-03 RX ADMIN — ACETAMINOPHEN 650 MG: 325 TABLET ORAL at 06:04

## 2018-04-03 RX ADMIN — ROSUVASTATIN CALCIUM 10 MG: 10 TABLET, FILM COATED ORAL at 09:04

## 2018-04-03 RX ADMIN — HYDROXYCHLOROQUINE SULFATE 200 MG: 200 TABLET, FILM COATED ORAL at 09:04

## 2018-04-03 RX ADMIN — NITROGLYCERIN 0.5 INCH: 20 OINTMENT TOPICAL at 10:04

## 2018-04-03 RX ADMIN — CARVEDILOL 25 MG: 12.5 TABLET, FILM COATED ORAL at 08:04

## 2018-04-03 RX ADMIN — HYDRALAZINE HYDROCHLORIDE 100 MG: 25 TABLET ORAL at 09:04

## 2018-04-03 RX ADMIN — HEPARIN SODIUM 5000 UNITS: 5000 INJECTION, SOLUTION INTRAVENOUS; SUBCUTANEOUS at 05:04

## 2018-04-03 RX ADMIN — HYDRALAZINE HYDROCHLORIDE 50 MG: 25 TABLET, FILM COATED ORAL at 05:04

## 2018-04-03 RX ADMIN — HEPARIN SODIUM 5000 UNITS: 5000 INJECTION, SOLUTION INTRAVENOUS; SUBCUTANEOUS at 09:04

## 2018-04-03 RX ADMIN — LEVOTHYROXINE SODIUM 75 MCG: 75 TABLET ORAL at 05:04

## 2018-04-03 RX ADMIN — NITROGLYCERIN 0.5 INCH: 20 OINTMENT TOPICAL at 11:04

## 2018-04-03 RX ADMIN — HYDRALAZINE HYDROCHLORIDE 10 MG: 20 INJECTION INTRAMUSCULAR; INTRAVENOUS at 08:04

## 2018-04-03 RX ADMIN — NICARDIPINE HYDROCHLORIDE 1 MG/HR: 0.2 INJECTION, SOLUTION INTRAVENOUS at 10:04

## 2018-04-03 RX ADMIN — FAMOTIDINE 20 MG: 20 TABLET, FILM COATED ORAL at 08:04

## 2018-04-03 RX ADMIN — HYDROCODONE BITARTRATE AND ACETAMINOPHEN 1 TABLET: 5; 325 TABLET ORAL at 09:04

## 2018-04-03 RX ADMIN — HYDRALAZINE HYDROCHLORIDE 10 MG: 20 INJECTION INTRAMUSCULAR; INTRAVENOUS at 06:04

## 2018-04-03 RX ADMIN — NITROGLYCERIN 0.5 INCH: 20 OINTMENT TOPICAL at 06:04

## 2018-04-03 RX ADMIN — CHLORTHALIDONE 25 MG: 25 TABLET ORAL at 08:04

## 2018-04-03 RX ADMIN — CITALOPRAM HYDROBROMIDE 30 MG: 20 TABLET ORAL at 08:04

## 2018-04-03 RX ADMIN — ASPIRIN 81 MG: 81 TABLET, COATED ORAL at 08:04

## 2018-04-03 RX ADMIN — ACETAMINOPHEN 650 MG: 325 TABLET ORAL at 05:04

## 2018-04-03 RX ADMIN — HYDROXYCHLOROQUINE SULFATE 200 MG: 200 TABLET, FILM COATED ORAL at 08:04

## 2018-04-03 RX ADMIN — SODIUM BICARBONATE 650 MG TABLET 650 MG: at 08:04

## 2018-04-03 RX ADMIN — SODIUM BICARBONATE 650 MG TABLET 650 MG: at 09:04

## 2018-04-03 RX ADMIN — HEPARIN SODIUM 5000 UNITS: 5000 INJECTION, SOLUTION INTRAVENOUS; SUBCUTANEOUS at 02:04

## 2018-04-03 NOTE — TELEPHONE ENCOUNTER
----- Message from Deanna Maradiaga sent at 4/3/2018 10:36 AM CDT -----  Contact: Asia/Joseph /[phone]  FYI:Patient was admitted to [facility] on  4/1/18.

## 2018-04-03 NOTE — ASSESSMENT & PLAN NOTE
- Chronic microcytic anemia.   - Possibly due to occult blood loss and also due to chronic kidney disease.   - Needs endoscopic evaluation if has not had.

## 2018-04-03 NOTE — PLAN OF CARE
Problem: Patient Care Overview  Goal: Plan of Care Review  Outcome: Ongoing (interventions implemented as appropriate)  PLAN OF CARE REVIEWED WITH PT.  PT AA+OX4.  ABLE TO MAKE NEEDS KNOWN.  DOES NOT APPEAR TO BE IN ANY DISTRESS.  C/O PAIN.  PAIN MEDICATION GIVEN AS ORDERED.  REQUIRES MODERATE ASSIST WITH ADLS.  LEE CATHETER CLEAN, DRY, AND INTACT DRAINING CLEAR YELLOW URINE TO GRAVITY.  CATHETER CARE PROVIDED.  PT REMAINS FREE FROM FALLS, INJURY, AND TRAUMA.  FALL PRECAUTIONS IN PLACE.  CALL LIGHT WITHIN REACH.  WILL CONTINUE TO MONITOR.

## 2018-04-03 NOTE — NURSING
"WHEN THIS NURSE (JAMES) ATTEMPTED TO GIVE SCHEDULED HYDRALAZINE A MESSAGE FROM [facility] STATED BASED ON THE MEDICATION IT WAS TOO EARLY TO GIVE.  PT HAS SCHEDULED PO HYDRALAZINE AND IV PRN MEDICATION ORDERED.  ON CALL NP (MARCELLA) NOTIFIED BY THIS NURSE (JAMES).  ON CALL NP (MARCELLA) STATED "IT'S OKAY TO GIVE SCHEDULE HYDRALAZINE NOW."  WILL CONTINUE TO MONITOR.   "

## 2018-04-03 NOTE — NURSING
"PT'S /95 HR 66.  ON CALL NP (MARCELLA) NOTIFIED BY THIS NURSE (RM).  ON CALL NP (MARCELLA) STATED "GIVE THE PATIENT THE SCHEDULED HYDRALAZINE."  WILL CONTINUE TO MONITOR.    "

## 2018-04-03 NOTE — PROGRESS NOTES
[facility] Hospital Medicine  Progress Note    Patient Name: Ewelina Arnold  MRN: [id number]  Patient Class: IP- Inpatient   Admission Date: 4/1/2018  Length of Stay: 2 days  Attending Physician: Dominick Maradiaga MD  Primary Care Provider: Kalie Walton MD        Subjective:     Principal Problem:Acute kidney injury superimposed on chronic kidney disease    HPI:  This is a 72 y.o. Female, with a history of CKD, CHF, and Lupus, who presents with complaint of shortness of breath that began two days ago. Patient was admitted from 3/19 to 3/28 for MARYAM and emergent hypertension. She reports leg swelling that has worsened since discharge. She reports associated abdominal distension and decreased urination. She reports shortness of breath worsens with exertion. She denies fever, chills, cough, chest pain, dysuria, urinary urgency, or urinary frequency. Patient states she only has one kidney because she donated the other.     Hospital Course:  Patient is a 72 year-old woman with hypertension, hyperaldosteronism, and chronic kidney disease stage IV who was recently discharged for the hospital following multiple days of adjustments with her antihypertensive regimen with goal of achieving reasonable blood pressure control along with stable kidney function.  Patient's serum creatinine had been stable (1.6 mg/dL on discharge) but she returned to the emergency department with shortness of breath and also visual hallucinations which she attributes to clonidine.  Laboratory studies reveal acute kidney injury (serum creatinine 2.9 mg/dL) along with hyperkalemia (5.4 mmol/L.  Improving with fluid challenge.  Nephrology consulted.    Interval History:  Pt new to me.  Going to ICU for elevated BP.   States has headache and feels a little nauseous.  No chest pain.  Discussed with Joey Barnard.         Review of Systems   Constitutional: Negative for diaphoresis and fever.   Eyes: Negative for discharge and  visual disturbance.   Respiratory: Negative for cough and shortness of breath.    Cardiovascular: Negative for chest pain and palpitations.   Gastrointestinal: Positive for nausea. Negative for abdominal pain.   Genitourinary: Negative for difficulty urinating and dysuria.   Musculoskeletal: Negative for arthralgias and myalgias.   Skin: Negative for color change and rash.   Neurological: Positive for headaches. Negative for dizziness and numbness.   Psychiatric/Behavioral: Negative for agitation and confusion.     Objective:     Vital Signs (Most Recent):  Temp: 98.9 °F (37.2 °C) (04/03/18 0735)  Pulse: 71 (04/03/18 0947)  Resp: 18 (04/03/18 0735)  BP: (!) 227/102 (04/03/18 0947)  SpO2: 95 % (04/03/18 0847) Vital Signs (24h Range):  Temp:  [97.8 °F (36.6 °C)-98.9 °F (37.2 °C)] 98.9 °F (37.2 °C)  Pulse:  [62-79] 71  Resp:  [17-18] 18  SpO2:  [95 %-100 %] 95 %  BP: (186-249)/() 227/102     Weight: 53.7 kg (118 lb 6.2 oz)  Body mass index is 19.7 kg/m².    Intake/Output Summary (Last 24 hours) at 04/03/18 1013  Last data filed at 04/03/18 0513   Gross per 24 hour   Intake              600 ml   Output             1050 ml   Net             -450 ml      Physical Exam   Constitutional: She is oriented to person, place, and time. She appears well-developed and well-nourished.   HENT:   Head: Normocephalic and atraumatic.   Eyes: Conjunctivae are normal. Pupils are equal, round, and reactive to light.   Neck: Normal range of motion. Neck supple.   Cardiovascular: Regular rhythm and normal heart sounds.    Pulmonary/Chest: Effort normal and breath sounds normal.   Abdominal: Soft. Bowel sounds are normal.   Musculoskeletal: She exhibits no edema or tenderness.   Neurological: She is alert and oriented to person, place, and time.   Skin: Skin is warm and dry.       Significant Labs:   CBC:   Recent Labs  Lab 04/02/18  0446 04/03/18  0456   WBC 4.37 4.00   HGB 7.7* 7.9*   HCT 24.0* 24.7*    241     CMP:   Recent  Labs  Lab 04/02/18  0446 04/03/18  0456    136   K 4.9 5.6*   * 111*   CO2 19* 17*   GLU 73 52*   BUN 47* 49*   CREATININE 2.6* 2.5*   CALCIUM 7.9* 8.4*   ANIONGAP 7* 8   EGFRNONAA 18* 19*     TSH:   Recent Labs  Lab 04/02/18  0446   TSH 0.885       Significant Imaging: I have reviewed all pertinent imaging results/findings within the past 24 hours.   Imaging Results          X-Ray Chest 1 View (Final result)  Result time 04/01/18 02:43:29    Final result by Miguel Angel Mendes MD (04/01/18 02:43:29)                 Impression:      Stable cardiomegaly.  Stable trace residual right pleural fluid and/or pleural thickening extending into the base of the major fissure.    No significant change from prior study.      Electronically signed by: Miguel Angel Mendes MD  Date:    04/01/2018  Time:    02:43             Narrative:    EXAMINATION:  XR CHEST 1 VIEW    CLINICAL HISTORY:  Other forms of dyspnea    TECHNIQUE:  Single frontal view of the chest was performed.    COMPARISON:  January 15, 2018    FINDINGS:  Stable cardiomegaly.  Stable trace residual right pleural fluid and/or pleural thickening extending into the base of the major fissure.    Heart and lungs  appear unchanged when allowing for differences in technique and positioning.                              Assessment/Plan:      * Acute kidney injury superimposed on chronic kidney disease stage IV    - Patient discharged on 3/28/2018 with a creatinine of 1.6 mg/dL and presented now with 2.9 mg/dL and with hyperkalemia.    - Spironolactone held.   - US 4/2 shows right medical renal disease.   - Renal Artery US 3/20/18 showed: No significant increased velocity within the visualize the main renal artery.  There is aortic atherosclerotic calcification present.   - Nephrology following.           Metabolic acidosis, normal anion gap (NAG)    - Sodium bicarbonate BID per nephrology.   - Monitor.          History of unilateral nephrectomy LEFT    - Noted on  imaging.             MARYAM (acute kidney injury)              Hyperaldosteronism    - Unable to tolerate spirolactone.    - Hold off on spirolactone for now.          Chronic diastolic congestive heart failure    - Despite elevated BNP, does not appear acute decompensated.    - Continue with medical therapy as tolerated.  - Cardiology following.              Hypertensive crisis    - Difficult to control.  Patient reports hallucinations with clonidine.   - Despite high dose Coreg, hydralazine 100 q 8, BP still high.   - Going to ICU for cardene drip.    - Discussed with nephrology.        Microcytic anemia    - Chronic microcytic anemia.   - Possibly due to occult blood loss and also due to chronic kidney disease.   - Needs endoscopic evaluation if has not had.        Dyslipidemia    - Continue with statin therapy.          Hypothyroidism    - TSH at goal.    - Continue levothyroxine.            VTE Risk Mitigation         Ordered     heparin (porcine) injection 5,000 Units  Every 8 hours     Route:  Subcutaneous        04/01/18 0527     Place NADER hose  Until discontinued      04/01/18 0527     Place sequential compression device  Until discontinued      04/01/18 0527     IP VTE HIGH RISK PATIENT  Once      04/01/18 0527              Dominick Maradiaga MD  Department of Hospital Medicine   [facility]

## 2018-04-03 NOTE — ASSESSMENT & PLAN NOTE
- Patient discharged on 3/28/2018 with a creatinine of 1.6 mg/dL and presented now with 2.9 mg/dL and with hyperkalemia.    - Spironolactone held.   - US 4/2 shows right medical renal disease.   - Renal Artery US 3/20/18 showed: No significant increased velocity within the visualize the main renal artery.  There is aortic atherosclerotic calcification present.   - Nephrology following.

## 2018-04-03 NOTE — PROGRESS NOTES
[facility]  Cardiology  Progress Note    Patient Name: Ewelina Arnold  MRN: [id number]  Admission Date: 4/1/2018  Hospital Length of Stay: 2 days  Code Status: Full Code   Attending Physician: Dominick Maradiaga MD   Primary Care Physician: Kalie Walton MD  Expected Discharge Date:   Principal Problem:Acute kidney injury superimposed on chronic kidney disease    Subjective:     Brief HPI:     71 yo female with single right kidney after donating her left kidney to her brother in 1985. She developed lupus in 1994. Lately accelerated hypertension and rise in BUN/crea. On 1/18/2018 she had a Renal Duplex that did not reveal any increased velocity in the right renal artery but possibly dampened velocities more distally. She had a repeat Renal Duplex on 3/20/2018 that reveled a similar peak velocity in the right renal artery of 0.64 m/s. I was asked to see her at that time for the possibility of FRAN and advised against angiography as I felt FRAN was unlikely. She hasd a lengthy stay and was discharged 3/28/2018. She comes back in due to worsening dyspnea with some edema of her legs. Her renal function has worsened.    Hospital Course:     4/3/2018: Transferred to ICU for nicardipine iv.    Interval History:     She was very hypertensive earlier. Then some chest discomfort.     Review of Systems   Cardiovascular: Positive for chest pain, leg swelling and orthopnea. Negative for palpitations, paroxysmal nocturnal dyspnea and syncope.   Respiratory: Positive for shortness of breath. Negative for cough, hemoptysis and wheezing.      Objective:     Vital Signs (Most Recent):  Temp: 97.5 °F (36.4 °C) (04/03/18 1530)  Pulse: 70 (04/03/18 1830)  Resp: 16 (04/03/18 1830)  BP: (!) 200/92 (04/03/18 1830)  SpO2: 100 % (04/03/18 1830) Vital Signs (24h Range):  Temp:  [97.5 °F (36.4 °C)-98.9 °F (37.2 °C)] 97.5 °F (36.4 °C)  Pulse:  [62-79] 70  Resp:  [14-26] 16  SpO2:  [95 %-100 %] 100 %  BP: (144-249)/()  200/92     Weight: 53.7 kg (118 lb 6.2 oz)  Body mass index is 19.7 kg/m².    SpO2: 100 %  O2 Device (Oxygen Therapy): room air      Intake/Output Summary (Last 24 hours) at 04/03/18 1844  Last data filed at 04/03/18 1023   Gross per 24 hour   Intake              240 ml   Output              625 ml   Net             -385 ml       Lines/Drains/Airways     Drain                 Urethral Catheter 04/01/18 1235 Straight-tip 16 Fr. 2 days          Peripheral Intravenous Line                 Peripheral IV - Single Lumen 04/01/18 0215 Left Antecubital 2 days                Physical Exam   Constitutional: She appears well-developed and well-nourished.   Cardiovascular: Regular rhythm and S2 normal.  Bradycardia present.  Exam reveals gallop and S4.    Pulmonary/Chest: She has rales in the right lower field.   Abdominal: Soft. Normal appearance.       Current Medications:     aspirin  81 mg Oral Daily    carvedilol  25 mg Oral BID    citalopram  30 mg Oral Daily    famotidine  20 mg Oral Daily    heparin (porcine)  5,000 Units Subcutaneous Q8H    hydrALAZINE  100 mg Oral Q8H    hydroxychloroquine  200 mg Oral BID    latanoprost  1 drop Both Eyes QHS    levothyroxine  75 mcg Oral Before breakfast    nitroGLYCERIN 2% TD oint  0.5 inch Topical (Top) Q6H    rosuvastatin  10 mg Oral QHS    sodium bicarbonate  650 mg Oral BID     Current Laboratory Results:    Recent Results (from the past 24 hour(s))   Magnesium    Collection Time: 04/03/18  4:56 AM   Result Value Ref Range    Magnesium 1.7 1.6 - 2.6 mg/dL   Phosphorus    Collection Time: 04/03/18  4:56 AM   Result Value Ref Range    Phosphorus 4.8 (H) 2.7 - 4.5 mg/dL   CBC with Automated Differential    Collection Time: 04/03/18  4:56 AM   Result Value Ref Range    WBC 4.00 3.90 - 12.70 K/uL    RBC 3.08 (L) 4.00 - 5.40 M/uL    Hemoglobin 7.9 (L) 12.0 - 16.0 g/dL    Hematocrit 24.7 (L) 37.0 - 48.5 %    MCV 80 (L) 82 - 98 fL    MCH 25.6 (L) 27.0 - 31.0 pg    MCHC 32.0  32.0 - 36.0 g/dL    RDW 14.5 11.5 - 14.5 %    Platelets 241 150 - 350 K/uL    MPV 9.8 9.2 - 12.9 fL    Gran # (ANC) 1.7 (L) 1.8 - 7.7 K/uL    Lymph # 1.6 1.0 - 4.8 K/uL    Mono # 0.6 0.3 - 1.0 K/uL    Eos # 0.1 0.0 - 0.5 K/uL    Baso # 0.01 0.00 - 0.20 K/uL    Gran% 42.5 38.0 - 73.0 %    Lymph% 38.8 18.0 - 48.0 %    Mono% 15.8 (H) 4.0 - 15.0 %    Eosinophil% 2.3 0.0 - 8.0 %    Basophil% 0.3 0.0 - 1.9 %    Differential Method Automated    Basic metabolic panel    Collection Time: 04/03/18  4:56 AM   Result Value Ref Range    Sodium 136 136 - 145 mmol/L    Potassium 5.6 (H) 3.5 - 5.1 mmol/L    Chloride 111 (H) 95 - 110 mmol/L    CO2 17 (L) 23 - 29 mmol/L    Glucose 52 (L) 70 - 110 mg/dL    BUN, Bld 49 (H) 8 - 23 mg/dL    Creatinine 2.5 (H) 0.5 - 1.4 mg/dL    Calcium 8.4 (L) 8.7 - 10.5 mg/dL    Anion Gap 8 8 - 16 mmol/L    eGFR if African American 21 (A) >60 mL/min/1.73 m^2    eGFR if non African American 19 (A) >60 mL/min/1.73 m^2   Troponin I    Collection Time: 04/03/18  9:58 AM   Result Value Ref Range    Troponin I 0.186 (H) 0.000 - 0.026 ng/mL     Current Imaging Results:    Imaging Results          X-Ray Chest 1 View (Final result)  Result time 04/01/18 02:43:29    Final result by Miguel Angel Mendes MD (04/01/18 02:43:29)                 Impression:      Stable cardiomegaly.  Stable trace residual right pleural fluid and/or pleural thickening extending into the base of the major fissure.    No significant change from prior study.      Electronically signed by: Miguel Angel Mendes MD  Date:    04/01/2018  Time:    02:43             Narrative:    EXAMINATION:  XR CHEST 1 VIEW    CLINICAL HISTORY:  Other forms of dyspnea    TECHNIQUE:  Single frontal view of the chest was performed.    COMPARISON:  January 15, 2018    FINDINGS:  Stable cardiomegaly.  Stable trace residual right pleural fluid and/or pleural thickening extending into the base of the major fissure.    Heart and lungs  appear unchanged when allowing for  differences in technique and positioning.                                Assessment and Plan:     Problem List:    Active Diagnoses:    Diagnosis Date Noted POA    PRINCIPAL PROBLEM:  Acute kidney injury superimposed on chronic kidney disease stage IV [N17.9, N18.9] 01/21/2018 Yes    MARYAM (acute kidney injury) [N17.9] 04/03/2018 Yes    History of unilateral nephrectomy LEFT [Z90.5] 04/03/2018 Not Applicable    Metabolic acidosis, normal anion gap (NAG) [E87.2] 04/03/2018 Yes    Chronic diastolic congestive heart failure [I50.32] 03/21/2018 Yes    Hyperaldosteronism [E26.9] 03/21/2018 Yes    Hypertensive crisis [I16.9] 03/19/2018 Yes    Microcytic anemia [D50.9] 10/16/2017 Yes    Dyslipidemia [E78.5]  Yes    Hypothyroidism [E03.9] 05/13/2015 Yes      Problems Resolved During this Admission:    Diagnosis Date Noted Date Resolved POA     Assessment and Plan:     1. Chronic Kidney Disease, Stage 4              4/2/2018: BUN/crea 47/2.6. CrCl 20.              Appears to be major issue.     2. Heart Failure, Diastolic, Acute on Chronic              4/2/2018: Normal left ventricular size with low normal to mildly depressed systolic function. EF 50-55%. Severe LVH. Moderate diastolic dysfunction. Moderate to severe AS - 3.3 m/s - 0.9 cm2. SPAP 52 mmHg. Trivial pericardial effusion.               Probably low flow moderate to severe AS with normal EF.              Doubt AS much of a cause for her condition but diastolic dysfunction advanced.     3. Hypertension              Severe and accelerated.              1/18/2018: U/S Renal: Right Renal Artery: Poorly visualized ostium. Peak velocity 0.64 m/s.               3/20/2018: U/S Renal:Right Renal Artery: Peak velocity 0.62 m/s.              That the above two studies do not reveal any increased velocity in the right renal artery makes significant renal artery stenosis as a cause for her severe hypertension and rise in BUN/crea unlikely.              I would not  favor angiography.              On carvedilol 25 mg Q12 and hydralazine 100 mg Q8.   Discussed management of her severe hypertension with Dr. Fallon.      4. Hypercholesterolemia              On rosuvastatin 10 mg Q24.      VTE Risk Mitigation         Ordered     heparin (porcine) injection 5,000 Units  Every 8 hours     Route:  Subcutaneous        04/01/18 0527     Place NADER hose  Until discontinued      04/01/18 0527     Place sequential compression device  Until discontinued      04/01/18 0527     IP VTE HIGH RISK PATIENT  Once      04/01/18 0527          Risa Lea MD  Cardiology  [facility]

## 2018-04-03 NOTE — SUBJECTIVE & OBJECTIVE
Interval History:  Pt new to me.  Going to ICU for elevated BP.   States has headache and feels a little nauseous.  No chest pain.  Discussed with Joey Barnard.         Review of Systems   Constitutional: Negative for diaphoresis and fever.   Eyes: Negative for discharge and visual disturbance.   Respiratory: Negative for cough and shortness of breath.    Cardiovascular: Negative for chest pain and palpitations.   Gastrointestinal: Positive for nausea. Negative for abdominal pain.   Genitourinary: Negative for difficulty urinating and dysuria.   Musculoskeletal: Negative for arthralgias and myalgias.   Skin: Negative for color change and rash.   Neurological: Positive for headaches. Negative for dizziness and numbness.   Psychiatric/Behavioral: Negative for agitation and confusion.     Objective:     Vital Signs (Most Recent):  Temp: 98.9 °F (37.2 °C) (04/03/18 0735)  Pulse: 71 (04/03/18 0947)  Resp: 18 (04/03/18 0735)  BP: (!) 227/102 (04/03/18 0947)  SpO2: 95 % (04/03/18 0847) Vital Signs (24h Range):  Temp:  [97.8 °F (36.6 °C)-98.9 °F (37.2 °C)] 98.9 °F (37.2 °C)  Pulse:  [62-79] 71  Resp:  [17-18] 18  SpO2:  [95 %-100 %] 95 %  BP: (186-249)/() 227/102     Weight: 53.7 kg (118 lb 6.2 oz)  Body mass index is 19.7 kg/m².    Intake/Output Summary (Last 24 hours) at 04/03/18 1013  Last data filed at 04/03/18 0513   Gross per 24 hour   Intake              600 ml   Output             1050 ml   Net             -450 ml      Physical Exam   Constitutional: She is oriented to person, place, and time. She appears well-developed and well-nourished.   HENT:   Head: Normocephalic and atraumatic.   Eyes: Conjunctivae are normal. Pupils are equal, round, and reactive to light.   Neck: Normal range of motion. Neck supple.   Cardiovascular: Regular rhythm and normal heart sounds.    Pulmonary/Chest: Effort normal and breath sounds normal.   Abdominal: Soft. Bowel sounds are normal.   Musculoskeletal: She exhibits no edema  or tenderness.   Neurological: She is alert and oriented to person, place, and time.   Skin: Skin is warm and dry.       Significant Labs:   CBC:   Recent Labs  Lab 04/02/18 0446 04/03/18 0456   WBC 4.37 4.00   HGB 7.7* 7.9*   HCT 24.0* 24.7*    241     CMP:   Recent Labs  Lab 04/02/18 0446 04/03/18 0456    136   K 4.9 5.6*   * 111*   CO2 19* 17*   GLU 73 52*   BUN 47* 49*   CREATININE 2.6* 2.5*   CALCIUM 7.9* 8.4*   ANIONGAP 7* 8   EGFRNONAA 18* 19*     TSH:   Recent Labs  Lab 04/02/18 0446   TSH 0.885       Significant Imaging: I have reviewed all pertinent imaging results/findings within the past 24 hours.   Imaging Results          X-Ray Chest 1 View (Final result)  Result time 04/01/18 02:43:29    Final result by Miguel Angel Mendes MD (04/01/18 02:43:29)                 Impression:      Stable cardiomegaly.  Stable trace residual right pleural fluid and/or pleural thickening extending into the base of the major fissure.    No significant change from prior study.      Electronically signed by: Miguel Angel Mendes MD  Date:    04/01/2018  Time:    02:43             Narrative:    EXAMINATION:  XR CHEST 1 VIEW    CLINICAL HISTORY:  Other forms of dyspnea    TECHNIQUE:  Single frontal view of the chest was performed.    COMPARISON:  January 15, 2018    FINDINGS:  Stable cardiomegaly.  Stable trace residual right pleural fluid and/or pleural thickening extending into the base of the major fissure.    Heart and lungs  appear unchanged when allowing for differences in technique and positioning.

## 2018-04-03 NOTE — NURSING
"PT'S /84 HR 65.  ON CALL NP (MARCELLA) NOTIFIED BY THIS NURSE (JAMES).  PT IS SCHEDULED TO RECEIVE SCHEDULED BP MEDICATIONS TONIGHT.  ON CALL NP (MARCELLA) STATED "LET'S SEE WHAT HER BLOOD PRESSURE IS AT MIDNIGHT."  WILL CONTINUE TO MONITOR.    "

## 2018-04-03 NOTE — ASSESSMENT & PLAN NOTE
- Despite elevated BNP, does not appear acute decompensated.    - Continue with medical therapy as tolerated.  - Cardiology following.

## 2018-04-03 NOTE — NURSING
"PT REPORTS URINE LEAKING FROM LEE CATHTER AND URINE ON TISSUE PAPER WHEN SHE WIPES HERSELF.  THIS NURSE (JAMES) DID NOT OBSERVE URINE LEAKING FROM LEE CATHETER.  LEE CATHETER BALLOON INFLATED.  ON CALL NP (MARCELLA) NOTIFIED BY THIS NURSE (JAMES).  ON CALL NP (MARCELLA) STATED "CONTINUE TO MONITOR FOR URINE LEAKING.  CALL BACK IF IT CONTINUES."  WILL CONTINUE TO MONITOR.      "

## 2018-04-03 NOTE — NURSING
Pt /115;  notified Joey Barnard, new orders to give hydralazine 10 mg IV push received and carried out.

## 2018-04-03 NOTE — NURSING
PER ON CALL NP (MARCELLA) IF PT SBP >180 OKAY TO GIVE IV HYDRALAZINE ONE HOUR AFTER SCHEDULED HYDRALAZINE.

## 2018-04-03 NOTE — PROGRESS NOTES
Progress Note  [facility]    Admit Date: 4/1/2018   LOS: 2 days     SUBJECTIVE:     Follow-up For:      Interval History:     Called to room for Acc HTN.  Pt seen and examined.  C/o SOB and some chest tightness.  Discussed with attending and team.  Will move to ICU for cardene drip and monitoring.      Review of Systems:  Constitutional: No fever or chills  Respiratory:  shortness of breath  Cardiovascular: tightness  Gastrointestinal: No nausea or vomiting  Neurological: No confusion or weakness    OBJECTIVE:     Vital Signs Range (Last 24H):  Vitals:    04/03/18 0947   BP: (!) 227/102   Pulse: 71   Resp:    Temp:        Temp:  [97.8 °F (36.6 °C)-98.9 °F (37.2 °C)]   Pulse:  [62-79]   Resp:  [17-18]   BP: (186-249)/()   SpO2:  [95 %-100 %]     I & O (Last 24H):    Intake/Output Summary (Last 24 hours) at 04/03/18 1011  Last data filed at 04/03/18 0513   Gross per 24 hour   Intake              600 ml   Output             1050 ml   Net             -450 ml       Physical Exam:  General appearance: ill appearing this am.   Eyes:  Conjunctivae/corneas clear. PERRL.  Lungs: Normal respiratory effort,   clear to auscultation bilaterally   Heart: evelyn/Regular rate and rhythm, S1, S2 normal, no murmur, rub or ruma.  Abdomen: Soft, non-tender non-distended; bowel sounds normal; no masses,  no organomegaly  Extremities: No cyanosis or clubbing. trace edema.  DP pulses 2+/4+ bilaterally.  Skin: Skin color, texture, turgor normal. No rashes or lesions  Neurologic: Normal strength and tone. No focal numbness or weakness   Parsons    Laboratory Data:      Recent Labs  Lab 04/03/18  0456      K 5.6*   *   CO2 17*   BUN 49*   CREATININE 2.5*   CALCIUM 8.4*   PHOS 4.8*     Lab Results   Component Value Date    .0 (H) 11/21/2016    CALCIUM 8.4 (L) 04/03/2018    PHOS 4.8 (H) 04/03/2018     Lab Results   Component Value Date    IRON 32 04/02/2018    TIBC 250 04/02/2018    FERRITIN 202 11/29/2017        Medications:  Medication list was reviewed and changes noted under Assessment/Plan.    Diagnostic Results:    Reviewed most recent CT/US/Echo/MRI    CONCLUSIONS     1 - Concentric hypertrophy.     2 - Low normal to mildly depressed left ventricular systolic function (EF 50-55%).     3 - No wall motion abnormalities.     4 - Impaired LV relaxation, elevated LAP (grade 2 diastolic dysfunction).     5 - Biatrial enlargement.     6 - Moderate to severe aortic stenosis, PABLTIO = 0.94 cm2, AVAi = 0.59 cm2/m2, peak velocity = 3.27 m/s, mean gradient = 16 mmHg.     7 - Mild aortic regurgitation.     8 - Mild to moderate mitral regurgitation.     9 - Pulmonary hypertension. The estimated PA systolic pressure is 52 mmHg.     10 - Trivial pericardial effusion.       ASSESSMENT/PLAN:     Hallucinations  -patient thinks it may have been due to clonidine. Hold for now     MARYAM on CKD IV (Baseline variable 1.8) likely due to CHF? And Acc HTN (N17.9, N18.4,):  Followed by Dr. Epperson at [facility] in past but she claims she has switched to Dr. Brizuela.  Prior renal arterial doppler with some evidence of FRAN but repeat US reviewed by Dr. Lea: does not show significant stenosis last month.  -Cr is now above baseline and BP uncontrolled.  -renal US without hydro  -need to consider FRAN.  CO2 angio vs other study?  Renally dose meds, avoid nephrotoxins, and monitor I/O's closely.       Metabolic acidosis  -po bicarb       Edema  -may be due to CCB  or CHF? BNP is elevated. No significant proteinuria. Doubt due to SLE nephropathy.  -defer lasix to Dr. Lea.    AS (I35.0):  -defer     Hyperkalemia:improving  -hold aldactone  chlorthalidone X1.   -low K diet.   -bicarb tabs.      HTN  -worsening.  -S/P hydralazine IVP's and still elevated.  -transfer to ICU for Cardene drip.        H/o Bradycardia (R00.1):  Pt was on high dose labetolol, and developed evelyn (HR 30-40s).  Held agents and HR improved  to 80's- 100's.         1o  Hyperaldosteronism? (E26.9):  I trust that Dr. Epperson has done appropriate work up.        SLE (M32.9):  Continue plaquenil.  Defer. Anti-histone negative.       Anemia: may be due to sle vs other. Defer to hospitalist for w/u. Iron is low. Stool positive for blood       Chest tightness (R07.89):  -EKG  -NTG  -Trop.  -defer.         See above  Very complex lady.

## 2018-04-03 NOTE — ASSESSMENT & PLAN NOTE
- Difficult to control.  Patient reports hallucinations with clonidine.   - Despite high dose Coreg, hydralazine 100 q 8, BP still high.   - Going to ICU for cardene drip.    - Discussed with nephrology.

## 2018-04-03 NOTE — NURSING
PT'S /81 HR 68.  ON CALL NP (MARCELLA) NOTIFIED BY THIS NURSE (RM).  NO NEW ORDERS GIVEN.  WILL CONTINUE TO MONITOR.

## 2018-04-04 PROBLEM — I15.0 RENOVASCULAR HYPERTENSION: Status: ACTIVE | Noted: 2018-04-04

## 2018-04-04 PROBLEM — E87.5 HYPERKALEMIA: Status: ACTIVE | Noted: 2018-04-04

## 2018-04-04 LAB
ANION GAP SERPL CALC-SCNC: 10 MMOL/L
BASOPHILS # BLD AUTO: 0.02 K/UL
BASOPHILS NFR BLD: 0.5 %
BUN SERPL-MCNC: 51 MG/DL
CALCIUM SERPL-MCNC: 8.4 MG/DL
CHLORIDE SERPL-SCNC: 108 MMOL/L
CO2 SERPL-SCNC: 15 MMOL/L
CREAT SERPL-MCNC: 2.2 MG/DL
DIFFERENTIAL METHOD: ABNORMAL
EOSINOPHIL # BLD AUTO: 0 K/UL
EOSINOPHIL NFR BLD: 0 %
ERYTHROCYTE [DISTWIDTH] IN BLOOD BY AUTOMATED COUNT: 14.4 %
EST. GFR  (AFRICAN AMERICAN): 25 ML/MIN/1.73 M^2
EST. GFR  (NON AFRICAN AMERICAN): 22 ML/MIN/1.73 M^2
FERRITIN SERPL-MCNC: 261 NG/ML
GLUCOSE SERPL-MCNC: 47 MG/DL
HCT VFR BLD AUTO: 27.1 %
HGB BLD-MCNC: 8.7 G/DL
LYMPHOCYTES # BLD AUTO: 1.3 K/UL
LYMPHOCYTES NFR BLD: 31.8 %
MAGNESIUM SERPL-MCNC: 1.7 MG/DL
MCH RBC QN AUTO: 25.8 PG
MCHC RBC AUTO-ENTMCNC: 32.1 G/DL
MCV RBC AUTO: 80 FL
MONOCYTES # BLD AUTO: 0.6 K/UL
MONOCYTES NFR BLD: 14.8 %
NEUTROPHILS # BLD AUTO: 2.1 K/UL
NEUTROPHILS NFR BLD: 52.9 %
PHOSPHATE SERPL-MCNC: 5.4 MG/DL
PLATELET # BLD AUTO: 267 K/UL
PMV BLD AUTO: 9.8 FL
POCT GLUCOSE: 104 MG/DL (ref 70–110)
POCT GLUCOSE: 330 MG/DL (ref 70–110)
POTASSIUM SERPL-SCNC: 5.7 MMOL/L
POTASSIUM SERPL-SCNC: 6.2 MMOL/L
RBC # BLD AUTO: 3.37 M/UL
SODIUM SERPL-SCNC: 133 MMOL/L
WBC # BLD AUTO: 3.93 K/UL

## 2018-04-04 PROCEDURE — 80048 BASIC METABOLIC PNL TOTAL CA: CPT

## 2018-04-04 PROCEDURE — 84132 ASSAY OF SERUM POTASSIUM: CPT

## 2018-04-04 PROCEDURE — 85025 COMPLETE CBC W/AUTO DIFF WBC: CPT

## 2018-04-04 PROCEDURE — 36415 COLL VENOUS BLD VENIPUNCTURE: CPT

## 2018-04-04 PROCEDURE — 20000000 HC ICU ROOM

## 2018-04-04 PROCEDURE — 63600175 PHARM REV CODE 636 W HCPCS: Performed by: INTERNAL MEDICINE

## 2018-04-04 PROCEDURE — 25000003 PHARM REV CODE 250: Performed by: NURSE PRACTITIONER

## 2018-04-04 PROCEDURE — 83735 ASSAY OF MAGNESIUM: CPT

## 2018-04-04 PROCEDURE — 63600175 PHARM REV CODE 636 W HCPCS: Performed by: NURSE PRACTITIONER

## 2018-04-04 PROCEDURE — 84100 ASSAY OF PHOSPHORUS: CPT

## 2018-04-04 PROCEDURE — 82728 ASSAY OF FERRITIN: CPT

## 2018-04-04 PROCEDURE — 25000003 PHARM REV CODE 250: Performed by: INTERNAL MEDICINE

## 2018-04-04 PROCEDURE — 99232 SBSQ HOSP IP/OBS MODERATE 35: CPT | Mod: ,,, | Performed by: HOSPITALIST

## 2018-04-04 RX ORDER — SODIUM BICARBONATE 650 MG/1
2 TABLET ORAL 2 TIMES DAILY
Status: DISCONTINUED | OUTPATIENT
Start: 2018-04-04 | End: 2018-04-06 | Stop reason: HOSPADM

## 2018-04-04 RX ORDER — NIFEDIPINE 30 MG/1
60 TABLET, EXTENDED RELEASE ORAL DAILY
Status: DISCONTINUED | OUTPATIENT
Start: 2018-04-04 | End: 2018-04-06 | Stop reason: HOSPADM

## 2018-04-04 RX ORDER — DEXTROSE 50 % IN WATER (D50W) INTRAVENOUS SYRINGE
25 ONCE
Status: COMPLETED | OUTPATIENT
Start: 2018-04-04 | End: 2018-04-04

## 2018-04-04 RX ADMIN — LATANOPROST 1 DROP: 50 SOLUTION OPHTHALMIC at 08:04

## 2018-04-04 RX ADMIN — HEPARIN SODIUM 5000 UNITS: 5000 INJECTION, SOLUTION INTRAVENOUS; SUBCUTANEOUS at 01:04

## 2018-04-04 RX ADMIN — HYDRALAZINE HYDROCHLORIDE 100 MG: 25 TABLET ORAL at 06:04

## 2018-04-04 RX ADMIN — SODIUM BICARBONATE 650 MG TABLET 1300 MG: at 08:04

## 2018-04-04 RX ADMIN — ACETAMINOPHEN 650 MG: 325 TABLET ORAL at 12:04

## 2018-04-04 RX ADMIN — HEPARIN SODIUM 5000 UNITS: 5000 INJECTION, SOLUTION INTRAVENOUS; SUBCUTANEOUS at 09:04

## 2018-04-04 RX ADMIN — HYDROXYCHLOROQUINE SULFATE 200 MG: 200 TABLET, FILM COATED ORAL at 08:04

## 2018-04-04 RX ADMIN — HYDRALAZINE HYDROCHLORIDE 100 MG: 25 TABLET ORAL at 09:04

## 2018-04-04 RX ADMIN — INSULIN HUMAN 3 UNITS: 100 INJECTION, SOLUTION PARENTERAL at 06:04

## 2018-04-04 RX ADMIN — ASPIRIN 81 MG: 81 TABLET, COATED ORAL at 08:04

## 2018-04-04 RX ADMIN — CARVEDILOL 25 MG: 12.5 TABLET, FILM COATED ORAL at 08:04

## 2018-04-04 RX ADMIN — FAMOTIDINE 20 MG: 20 TABLET, FILM COATED ORAL at 08:04

## 2018-04-04 RX ADMIN — ACETAMINOPHEN 650 MG: 325 TABLET ORAL at 05:04

## 2018-04-04 RX ADMIN — NIFEDIPINE 60 MG: 30 TABLET, FILM COATED, EXTENDED RELEASE ORAL at 08:04

## 2018-04-04 RX ADMIN — ROSUVASTATIN CALCIUM 10 MG: 10 TABLET, FILM COATED ORAL at 08:04

## 2018-04-04 RX ADMIN — DEXTROSE MONOHYDRATE 25 G: 500 INJECTION PARENTERAL at 06:04

## 2018-04-04 RX ADMIN — LEVOTHYROXINE SODIUM 75 MCG: 75 TABLET ORAL at 05:04

## 2018-04-04 RX ADMIN — CITALOPRAM HYDROBROMIDE 30 MG: 20 TABLET ORAL at 08:04

## 2018-04-04 RX ADMIN — NITROGLYCERIN 0.5 INCH: 20 OINTMENT TOPICAL at 05:04

## 2018-04-04 RX ADMIN — HYDRALAZINE HYDROCHLORIDE 100 MG: 25 TABLET ORAL at 01:04

## 2018-04-04 RX ADMIN — HEPARIN SODIUM 5000 UNITS: 5000 INJECTION, SOLUTION INTRAVENOUS; SUBCUTANEOUS at 05:04

## 2018-04-04 NOTE — ASSESSMENT & PLAN NOTE
- Chronic microcytic anemia.   - Possibly due to occult blood loss and also due to chronic kidney disease.   - States had a history of colon CA and last colonoscopy was about a year ago and was okay.  - Defer to PCP.

## 2018-04-04 NOTE — ASSESSMENT & PLAN NOTE
- Difficult to control.  Patient reports hallucinations with clonidine.   - Despite high dose Coreg, hydralazine 100 q 8, BP still high.   - Cardene drip helped so after long discussion with patient restarting Procardia.    - Discussed with nephrology.

## 2018-04-04 NOTE — SUBJECTIVE & OBJECTIVE
Interval History:   Feels better today.  Cardene drip controlled blood pressure.  Discussed with Joey Barnard and long discussion with patient at bedside.       Review of Systems   Constitutional: Negative for diaphoresis and fever.   Eyes: Negative for discharge and visual disturbance.   Respiratory: Negative for cough and shortness of breath.    Cardiovascular: Negative for chest pain and palpitations.   Gastrointestinal: Negative for abdominal pain and nausea.   Genitourinary: Negative for difficulty urinating and dysuria.   Musculoskeletal: Negative for arthralgias and myalgias.   Skin: Negative for color change and rash.   Neurological: Positive for headaches. Negative for dizziness and numbness.   Psychiatric/Behavioral: Negative for agitation and confusion.     Objective:     Vital Signs (Most Recent):  Temp: 98.2 °F (36.8 °C) (04/04/18 0700)  Pulse: 70 (04/04/18 0730)  Resp: 19 (04/04/18 0730)  BP: (!) 178/79 (04/04/18 0730)  SpO2: 98 % (04/04/18 0730) Vital Signs (24h Range):  Temp:  [97.5 °F (36.4 °C)-98.2 °F (36.8 °C)] 98.2 °F (36.8 °C)  Pulse:  [64-79] 70  Resp:  [11-29] 19  SpO2:  [95 %-100 %] 98 %  BP: (144-249)/() 178/79     Weight: 53.9 kg (118 lb 13.3 oz)  Body mass index is 19.77 kg/m².    Intake/Output Summary (Last 24 hours) at 04/04/18 0805  Last data filed at 04/04/18 0701   Gross per 24 hour   Intake           955.47 ml   Output              905 ml   Net            50.47 ml      Physical Exam   Constitutional: She is oriented to person, place, and time. She appears well-developed and well-nourished.   HENT:   Head: Normocephalic and atraumatic.   Eyes: Conjunctivae are normal. Pupils are equal, round, and reactive to light.   Neck: Normal range of motion. Neck supple.   Cardiovascular: Regular rhythm and normal heart sounds.    Pulmonary/Chest: Effort normal and breath sounds normal.   Abdominal: Soft. Bowel sounds are normal.   Musculoskeletal: She exhibits no edema or tenderness.    Neurological: She is alert and oriented to person, place, and time.   Skin: Skin is warm and dry.       Significant Labs:   BMP:   Recent Labs  Lab 04/04/18 0359   GLU 47*   *   K 6.2*      CO2 15*   BUN 51*   CREATININE 2.2*   CALCIUM 8.4*   MG 1.7     CBC:   Recent Labs  Lab 04/03/18 0456 04/04/18 0359   WBC 4.00 3.93   HGB 7.9* 8.7*   HCT 24.7* 27.1*    267     Magnesium:   Recent Labs  Lab 04/03/18  0456 04/04/18  0359   MG 1.7 1.7       Significant Imaging: I have reviewed all pertinent imaging results/findings within the past 24 hours.   Imaging Results          X-Ray Chest 1 View (Final result)  Result time 04/01/18 02:43:29    Final result by Miguel Angel Mendes MD (04/01/18 02:43:29)                 Impression:      Stable cardiomegaly.  Stable trace residual right pleural fluid and/or pleural thickening extending into the base of the major fissure.    No significant change from prior study.      Electronically signed by: Miguel Angel Mendes MD  Date:    04/01/2018  Time:    02:43             Narrative:    EXAMINATION:  XR CHEST 1 VIEW    CLINICAL HISTORY:  Other forms of dyspnea    TECHNIQUE:  Single frontal view of the chest was performed.    COMPARISON:  January 15, 2018    FINDINGS:  Stable cardiomegaly.  Stable trace residual right pleural fluid and/or pleural thickening extending into the base of the major fissure.    Heart and lungs  appear unchanged when allowing for differences in technique and positioning.

## 2018-04-04 NOTE — PLAN OF CARE
Pt assessed at bedside.  Medically improving, still in ICU. Pt expresses no new DC needs.         04/04/18 1525   Discharge Reassessment   Assessment Type Discharge Planning Reassessment   Provided patient/caregiver education on the expected discharge date and the discharge plan Yes   Do you have any problems affording any of your prescribed medications? No   Discharge Plan A Home with family   Can the patient answer the patient profile reliably? Yes, cognitively intact   How does the patient rate their overall health at the present time? Fair   Describe the patient's ability to walk at the present time. Minor restrictions or changes   How often would a person be available to care for the patient? Whenever needed   Number of comorbid conditions (as recorded on the chart) Four

## 2018-04-04 NOTE — ASSESSMENT & PLAN NOTE
- Discussed with nephrology and likely due to metabolic acidosis.  - Got D50 and insulin overnight by eICU.  - Bicarbonate dose increased.

## 2018-04-04 NOTE — EICU
Head ache.  Going on since admission.  Hypertensive, ac on CKD with renal artery narrowing.  Cardiology wants to keep SBP over 170. Non focal.  No encephalopathy.  Notes, labs and meds reviewed.  Already received tylenol at 6.30 pm without much help.    Plan:  - Hydrocodone oral 5/325 mg stat once.  Will avoid nephrotoxic meds  - to give IV hydralazine 10 mg once now( on prn order) for .  - Oxygen saturation  on room air fine. Call back if get sob.

## 2018-04-04 NOTE — PROGRESS NOTES
[facility] Hospital Medicine  Progress Note    Patient Name: Ewelina Arnold  MRN: [id number]  Patient Class: IP- Inpatient   Admission Date: 4/1/2018  Length of Stay: 3 days  Attending Physician: Dominick Maradiaga MD  Primary Care Provider: Kalie Walton MD        Subjective:     Principal Problem:Acute kidney injury superimposed on chronic kidney disease    HPI:  This is a 72 y.o. Female, with a history of CKD, CHF, and Lupus, who presents with complaint of shortness of breath that began two days ago. Patient was admitted from 3/19 to 3/28 for MARYAM and emergent hypertension. She reports leg swelling that has worsened since discharge. She reports associated abdominal distension and decreased urination. She reports shortness of breath worsens with exertion. She denies fever, chills, cough, chest pain, dysuria, urinary urgency, or urinary frequency. Patient states she only has one kidney because she donated the other.     Hospital Course:  Patient is a 72 year-old woman with hypertension, hyperaldosteronism, and chronic kidney disease stage IV who was recently discharged for the hospital following multiple days of adjustments with her antihypertensive regimen with goal of achieving reasonable blood pressure control along with stable kidney function.  Patient's serum creatinine had been stable (1.6 mg/dL on discharge) but she returned to the emergency department with shortness of breath and also visual hallucinations which she attributes to clonidine.  Laboratory studies reveal acute kidney injury (serum creatinine 2.9 mg/dL) along with hyperkalemia (5.4 mmol/L.  Improving with fluid challenge.  Nephrology consulted.    Interval History:   Feels better today.  Cardene drip controlled blood pressure.  Discussed with Joey Barnard and long discussion with patient at bedside.       Review of Systems   Constitutional: Negative for diaphoresis and fever.   Eyes: Negative for discharge and visual  disturbance.   Respiratory: Negative for cough and shortness of breath.    Cardiovascular: Negative for chest pain and palpitations.   Gastrointestinal: Negative for abdominal pain and nausea.   Genitourinary: Negative for difficulty urinating and dysuria.   Musculoskeletal: Negative for arthralgias and myalgias.   Skin: Negative for color change and rash.   Neurological: Positive for headaches. Negative for dizziness and numbness.   Psychiatric/Behavioral: Negative for agitation and confusion.     Objective:     Vital Signs (Most Recent):  Temp: 98.2 °F (36.8 °C) (04/04/18 0700)  Pulse: 70 (04/04/18 0730)  Resp: 19 (04/04/18 0730)  BP: (!) 178/79 (04/04/18 0730)  SpO2: 98 % (04/04/18 0730) Vital Signs (24h Range):  Temp:  [97.5 °F (36.4 °C)-98.2 °F (36.8 °C)] 98.2 °F (36.8 °C)  Pulse:  [64-79] 70  Resp:  [11-29] 19  SpO2:  [95 %-100 %] 98 %  BP: (144-249)/() 178/79     Weight: 53.9 kg (118 lb 13.3 oz)  Body mass index is 19.77 kg/m².    Intake/Output Summary (Last 24 hours) at 04/04/18 0805  Last data filed at 04/04/18 0701   Gross per 24 hour   Intake           955.47 ml   Output              905 ml   Net            50.47 ml      Physical Exam   Constitutional: She is oriented to person, place, and time. She appears well-developed and well-nourished.   HENT:   Head: Normocephalic and atraumatic.   Eyes: Conjunctivae are normal. Pupils are equal, round, and reactive to light.   Neck: Normal range of motion. Neck supple.   Cardiovascular: Regular rhythm and normal heart sounds.    Pulmonary/Chest: Effort normal and breath sounds normal.   Abdominal: Soft. Bowel sounds are normal.   Musculoskeletal: She exhibits no edema or tenderness.   Neurological: She is alert and oriented to person, place, and time.   Skin: Skin is warm and dry.       Significant Labs:   BMP:   Recent Labs  Lab 04/04/18  0359   GLU 47*   *   K 6.2*      CO2 15*   BUN 51*   CREATININE 2.2*   CALCIUM 8.4*   MG 1.7     CBC:    Recent Labs  Lab 04/03/18  0456 04/04/18  0359   WBC 4.00 3.93   HGB 7.9* 8.7*   HCT 24.7* 27.1*    267     Magnesium:   Recent Labs  Lab 04/03/18  0456 04/04/18  0359   MG 1.7 1.7       Significant Imaging: I have reviewed all pertinent imaging results/findings within the past 24 hours.   Imaging Results          X-Ray Chest 1 View (Final result)  Result time 04/01/18 02:43:29    Final result by Miguel Angel Mendes MD (04/01/18 02:43:29)                 Impression:      Stable cardiomegaly.  Stable trace residual right pleural fluid and/or pleural thickening extending into the base of the major fissure.    No significant change from prior study.      Electronically signed by: Miguel Angel Mendes MD  Date:    04/01/2018  Time:    02:43             Narrative:    EXAMINATION:  XR CHEST 1 VIEW    CLINICAL HISTORY:  Other forms of dyspnea    TECHNIQUE:  Single frontal view of the chest was performed.    COMPARISON:  January 15, 2018    FINDINGS:  Stable cardiomegaly.  Stable trace residual right pleural fluid and/or pleural thickening extending into the base of the major fissure.    Heart and lungs  appear unchanged when allowing for differences in technique and positioning.                              Assessment/Plan:      * Acute kidney injury superimposed on chronic kidney disease stage IV    - Patient discharged on 3/28/2018 with a creatinine of 1.6 mg/dL and presented now with 2.9 mg/dL and with hyperkalemia.    - Spironolactone held.   - US 4/2 shows right medical renal disease.   - Renal Artery US 3/20/18 showed: No significant increased velocity within the visualize the main renal artery.  There is aortic atherosclerotic calcification present.   - Plan Renal vessel US today as FRAN noted on US 1/2018, not noted on repeat imaging 3/2018.  - Nephrology following.           Renovascular hypertension    - Nephrology plans US today to evaluate for any FRAN.             Hyperkalemia    - Discussed with nephrology  and likely due to metabolic acidosis.  - Got D50 and insulin overnight by eICU.  - Bicarbonate dose increased.             Metabolic acidosis, normal anion gap (NAG)    - Sodium bicarbonate BID per nephrology.   - Monitor.          History of unilateral nephrectomy LEFT    - Noted on imaging.             MARYAM (acute kidney injury)              Hyperaldosteronism    - Unable to tolerate spirolactone.    - Hold off on spirolactone for now.          Chronic diastolic congestive heart failure    - Despite elevated BNP, does not appear acute decompensated.    - Continue with medical therapy as tolerated.  - Cardiology following.              Hypertensive crisis    - Difficult to control.  Patient reports hallucinations with clonidine.   - Despite high dose Coreg, hydralazine 100 q 8, BP still high.   - Cardene drip helped so after long discussion with patient restarting Procardia.    - Discussed with nephrology.        Microcytic anemia    - Chronic microcytic anemia.   - Possibly due to occult blood loss and also due to chronic kidney disease.   - States had a history of colon CA and last colonoscopy was about a year ago and was okay.  - Defer to PCP.           Dyslipidemia    - Continue with statin therapy.          Hypothyroidism    - TSH at goal.    - Continue levothyroxine.            VTE Risk Mitigation         Ordered     heparin (porcine) injection 5,000 Units  Every 8 hours     Route:  Subcutaneous        04/01/18 0527     Place NADER hose  Until discontinued      04/01/18 0527     Place sequential compression device  Until discontinued      04/01/18 0527     IP VTE HIGH RISK PATIENT  Once      04/01/18 0527              Dominick Maradiaga MD  Department of Hospital Medicine   [facility]

## 2018-04-04 NOTE — PLAN OF CARE
Problem: Patient Care Overview  Goal: Plan of Care Review  Outcome: Ongoing (interventions implemented as appropriate)  Patient was awake, alert, conscious and coherent. Afebrile, Tmax 38 F. On room air, not in respiratory distress. Attached to cardiac monitor showed normal sinus rhythm. Diet tolerated. No chest pain and hallucinations. Was complaining of intermittent headache despite administration of PRN tab tylenol, informed eICU MD , one time dose of Tab. Hydrocodone 5/325 was given PO as ordered. Able to ambulate from bed to toilet with minimal assistance. Continued Nicardipine drip, titrated accordingly, to maintain a SBP less than 180 mmHg. Patient was educated. Provided rest and comfort. Will continue to monitor.    0530 - Serum Glucose 47 (critical value), apple juice given ; K+ 6.2 referred and acknowledged by Dr. Vazquez ([facility]). D50% 25 g + 3 units regular insulin IV given as ordered. Blood sugar monitoring every  Hour for 3 hours.

## 2018-04-04 NOTE — PROGRESS NOTES
Progress Note  [facility]    Admit Date: 4/1/2018   LOS: 3 days     SUBJECTIVE:     Follow-up For:      Interval History:     Extensive discussion at bedside with Dr. Maradiaga and family present.  No CP/SOB.  Had HA earlier.  Labs noted.       Review of Systems:  Constitutional: No fever or chills  Respiratory:  shortness of breath better  Cardiovascular: tightness improved  Gastrointestinal: No nausea or vomiting  Neurological: No confusion or weakness    OBJECTIVE:     Vital Signs Range (Last 24H):  Vitals:    04/04/18 0730   BP: (!) 178/79   Pulse: 70   Resp: 19   Temp:        Temp:  [97.5 °F (36.4 °C)-98.2 °F (36.8 °C)]   Pulse:  [64-79]   Resp:  [11-29]   BP: (144-249)/()   SpO2:  [95 %-100 %]     I & O (Last 24H):    Intake/Output Summary (Last 24 hours) at 04/04/18 0817  Last data filed at 04/04/18 0701   Gross per 24 hour   Intake           955.47 ml   Output              905 ml   Net            50.47 ml       Physical Exam:  General appearance: NAD.   Eyes:  Conjunctivae/corneas clear. PERRL.  Lungs: Normal respiratory effort,   clear to auscultation bilaterally   Heart: evelyn/Regular rate and rhythm, S1, S2 normal, no murmur, rub or ruma.  Abdomen: Soft, non-tender non-distended; bowel sounds normal; no masses,  no organomegaly  Extremities: No cyanosis or clubbing. trace edema.  DP pulses 2+/4+ bilaterally.  Skin: Skin color, texture, turgor normal. No rashes or lesions  Neurologic: Normal strength and tone. No focal numbness or weakness   Parsons    Laboratory Data:      Recent Labs  Lab 04/04/18  0359   *   K 6.2*      CO2 15*   BUN 51*   CREATININE 2.2*   CALCIUM 8.4*   PHOS 5.4*     Lab Results   Component Value Date    .0 (H) 11/21/2016    CALCIUM 8.4 (L) 04/04/2018    PHOS 5.4 (H) 04/04/2018     Lab Results   Component Value Date    IRON 32 04/02/2018    TIBC 250 04/02/2018    FERRITIN 202 11/29/2017       Medications:  Medication list was reviewed and changes noted  under Assessment/Plan.    Diagnostic Results:    Reviewed most recent CT/US/Echo/MRI    CONCLUSIONS     1 - Concentric hypertrophy.     2 - Low normal to mildly depressed left ventricular systolic function (EF 50-55%).     3 - No wall motion abnormalities.     4 - Impaired LV relaxation, elevated LAP (grade 2 diastolic dysfunction).     5 - Biatrial enlargement.     6 - Moderate to severe aortic stenosis, PABLITO = 0.94 cm2, AVAi = 0.59 cm2/m2, peak velocity = 3.27 m/s, mean gradient = 16 mmHg.     7 - Mild aortic regurgitation.     8 - Mild to moderate mitral regurgitation.     9 - Pulmonary hypertension. The estimated PA systolic pressure is 52 mmHg.     10 - Trivial pericardial effusion.       ASSESSMENT/PLAN:     Resolved Hallucinations  -patient thinks it may have been due to clonidine. Hold for now     Slowly resolving nonoliguric MARYAM on CKD IV (Baseline variable 1.8) likely due to CHF/And Acc HTN (N17.9, N18.4,):  Followed by Dr. Epperson at [facility] in past but she has switched to Dr. Brizuela.  Prior renal arterial doppler with some evidence of FRAN but repeat US reviewed by Dr. Lea does not show significant stenosis last month.  -Cr is now above baseline and BP uncontrolled but trends are improving.  -Renally dose meds, avoid nephrotoxins, and monitor I/O's closely.       Metabolic acidosis/Mild Hyperkalemia (E87.2, E87.5)  -increase po bicarb tabs.  -low K diet.  -insulin/D50 given by EICU earlier.        Edema  -secondary to CCB.   -advised about common findings with this drug class    AS (I35.0):  -defer       Acc HTN:  -transfered to ICU for Cardene drip 4/3.  -weaned off.  -Coreg, Hydralazine, and restart procardia.  Risk and SE profile reviewed.  -only other option would be Minoxidil.          H/o Bradycardia (R00.1):  Pt was on high dose labetolol, and developed evelyn (HR 30-40s).  Held agents and HR improved  to 80's- 100's.         1o Hyperaldosteronism? (E26.9):  Ironic that she now has  hyperkalemia.         SLE (M32.9):  Continue plaquenil.  Defer. Anti-histone negative.       Anemia: may be due to sle vs other. Defer to hospitalist for w/u. Iron is low. Stool positive for blood       Chest tightness (R07.89):  -resolved.  -EKG/Trop negative.  -Dr. Lea following.         See above  Very complex lady.    MARLENE gambino  Ambulate and monitor BP.   Home soon. Ok to transfer to tele.

## 2018-04-04 NOTE — ASSESSMENT & PLAN NOTE
- Patient discharged on 3/28/2018 with a creatinine of 1.6 mg/dL and presented now with 2.9 mg/dL and with hyperkalemia.    - Spironolactone held.   - US 4/2 shows right medical renal disease.   - Renal Artery US 3/20/18 showed: No significant increased velocity within the visualize the main renal artery.  There is aortic atherosclerotic calcification present.   - Plan Renal vessel US today as FRAN noted on US 1/2018, not noted on repeat imaging 3/2018.  - Nephrology following.

## 2018-04-04 NOTE — PROGRESS NOTES
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 She developed lupus in 1994. Lately accelerated hypertension and rise in BUN/crea. On 1/18/2018 she had a Renal Duplex that did not reveal any increased velocity in the right renal artery but possibly dampened velocities more distally. She had a repeat Renal Duplex on 3/20/2018 that reveled a similar peak velocity in the right renal artery of 0.64 m/s. I was asked to see her at that time for the possibility of FRAN and advised against angiography as I felt FRAN was unlikely. She hasd a lengthy stay and was discharged 3/28/2018. She comes back in due to worsening dyspnea with some edema of her legs. Her renal function has worsened.    Hospital Course:     4/3/2018: Transferred to ICU for nicardipine iv.    Interval History:     Blood pressure is down with the addition of nifedipine.     Review of Systems   Cardiovascular: Positive for chest pain, leg swelling and orthopnea. Negative for palpitations, paroxysmal nocturnal dyspnea and syncope.   Respiratory: Positive for shortness of breath. Negative for cough, hemoptysis and wheezing.      Objective:     Vital Signs (Most Recent):  Temp: 98.1 °F (36.7 °C) (04/04/18 1500)  Pulse: 60 (04/04/18 1545)  Resp: (!) 23 (04/04/18 1545)  BP: 137/65 (04/04/18 1545)  SpO2: 98 % (04/04/18 1545) Vital Signs (24h Range):  Temp:  [97.8 °F (36.6 °C)-98.2 °F (36.8 °C)] 98.1 °F (36.7 °C)  Pulse:  [58-78] 60  Resp:  [11-29] 23  SpO2:  [95 %-100 %] 98 %  BP: (134-211)/() 137/65      Weight: 53.9 kg (118 lb 13.3 oz)  Body mass index is 19.77 kg/m².    SpO2: 98 %  O2 Device (Oxygen Therapy): room air      Intake/Output Summary (Last 24 hours) at 04/04/18 1648  Last data filed at 04/04/18 0900   Gross per 24 hour   Intake          1105.47 ml   Output              480 ml   Net           625.47 ml       Lines/Drains/Airways     Peripheral Intravenous Line                 Peripheral IV - Single Lumen 04/01/18 0215 Right Hand 3 days         Peripheral IV - Single Lumen 04/04/18 0500 Right Antecubital less than 1 day                Physical Exam   Constitutional: She appears well-developed and well-nourished.   Cardiovascular: Regular rhythm and S2 normal.  Bradycardia present.  Exam reveals gallop and S4.    Pulmonary/Chest: She has rales in the right lower field.   Abdominal: Soft. Normal appearance.       Current Medications:     aspirin  81 mg Oral Daily    carvedilol  25 mg Oral BID    citalopram  30 mg Oral Daily    famotidine  20 mg Oral Daily    heparin (porcine)  5,000 Units Subcutaneous Q8H    hydrALAZINE  100 mg Oral Q8H    hydroxychloroquine  200 mg Oral BID    latanoprost  1 drop Both Eyes QHS    levothyroxine  75 mcg Oral Before breakfast    NIFEdipine  60 mg Oral Daily    rosuvastatin  10 mg Oral QHS    sodium bicarbonate  2 tablet Oral BID     Current Laboratory Results:    Recent Results (from the past 24 hour(s))   Magnesium    Collection Time: 04/04/18  3:59 AM   Result Value Ref Range    Magnesium 1.7 1.6 - 2.6 mg/dL   Phosphorus    Collection Time: 04/04/18  3:59 AM   Result Value Ref Range    Phosphorus 5.4 (H) 2.7 - 4.5 mg/dL   CBC with Automated Differential    Collection Time: 04/04/18  3:59 AM   Result Value Ref Range    WBC 3.93 3.90 - 12.70 K/uL    RBC 3.37 (L) 4.00 - 5.40 M/uL    Hemoglobin 8.7 (L) 12.0 - 16.0 g/dL    Hematocrit 27.1 (L) 37.0 - 48.5 %    MCV 80 (L) 82 - 98 fL    MCH 25.8 (L) 27.0 - 31.0 pg    MCHC 32.1 32.0 - 36.0 g/dL    RDW 14.4 11.5 -  14.5 %    Platelets 267 150 - 350 K/uL    MPV 9.8 9.2 - 12.9 fL    Gran # (ANC) 2.1 1.8 - 7.7 K/uL    Lymph # 1.3 1.0 - 4.8 K/uL    Mono # 0.6 0.3 - 1.0 K/uL    Eos # 0.0 0.0 - 0.5 K/uL    Baso # 0.02 0.00 - 0.20 K/uL    Gran% 52.9 38.0 - 73.0 %    Lymph% 31.8 18.0 - 48.0 %    Mono% 14.8 4.0 - 15.0 %    Eosinophil% 0.0 0.0 - 8.0 %    Basophil% 0.5 0.0 - 1.9 %    Differential Method Automated    Basic metabolic panel    Collection Time: 04/04/18  3:59 AM   Result Value Ref Range    Sodium 133 (L) 136 - 145 mmol/L    Potassium 6.2 (H) 3.5 - 5.1 mmol/L    Chloride 108 95 - 110 mmol/L    CO2 15 (L) 23 - 29 mmol/L    Glucose 47 (LL) 70 - 110 mg/dL    BUN, Bld 51 (H) 8 - 23 mg/dL    Creatinine 2.2 (H) 0.5 - 1.4 mg/dL    Calcium 8.4 (L) 8.7 - 10.5 mg/dL    Anion Gap 10 8 - 16 mmol/L    eGFR if African American 25 (A) >60 mL/min/1.73 m^2    eGFR if non African American 22 (A) >60 mL/min/1.73 m^2   Ferritin    Collection Time: 04/04/18  3:59 AM   Result Value Ref Range    Ferritin 261 20.0 - 300.0 ng/mL   POCT glucose    Collection Time: 04/04/18  6:37 AM   Result Value Ref Range    POCT Glucose 330 (H) 70 - 110 mg/dL   Potassium    Collection Time: 04/04/18  8:15 AM   Result Value Ref Range    Potassium 5.7 (H) 3.5 - 5.1 mmol/L   POCT glucose    Collection Time: 04/04/18  8:15 AM   Result Value Ref Range    POCT Glucose 104 70 - 110 mg/dL     Current Imaging Results:    Imaging Results          X-Ray Chest 1 View (Final result)  Result time 04/01/18 02:43:29    Final result by Miguel Angel Mendes MD (04/01/18 02:43:29)                 Impression:      Stable cardiomegaly.  Stable trace residual right pleural fluid and/or pleural thickening extending into the base of the major fissure.    No significant change from prior study.      Electronically signed by: Miguel Angel Mendes MD  Date:    04/01/2018  Time:    02:43             Narrative:    EXAMINATION:  XR CHEST 1 VIEW    CLINICAL HISTORY:  Other forms of  dyspnea    TECHNIQUE:  Single frontal view of the chest was performed.    COMPARISON:  January 15, 2018    FINDINGS:  Stable cardiomegaly.  Stable trace residual right pleural fluid and/or pleural thickening extending into the base of the major fissure.    Heart and lungs  appear unchanged when allowing for differences in technique and positioning.                                Assessment and Plan:     Problem List:    Active Diagnoses:    Diagnosis Date Noted POA    PRINCIPAL PROBLEM:  Acute kidney injury superimposed on chronic kidney disease stage IV [N17.9, N18.9] 01/21/2018 Yes    Hyperkalemia [E87.5] 04/04/2018 Yes    Renovascular hypertension [I15.0] 04/04/2018 Yes    MARYAM (acute kidney injury) [N17.9] 04/03/2018 Yes    History of unilateral nephrectomy LEFT [Z90.5] 04/03/2018 Not Applicable    Metabolic acidosis, normal anion gap (NAG) [E87.2] 04/03/2018 Yes    Chronic diastolic congestive heart failure [I50.32] 03/21/2018 Yes    Hyperaldosteronism [E26.9] 03/21/2018 Yes    Hypertensive crisis [I16.9] 03/19/2018 Yes    Microcytic anemia [D50.9] 10/16/2017 Yes    Dyslipidemia [E78.5]  Yes    Hypothyroidism [E03.9] 05/13/2015 Yes      Problems Resolved During this Admission:    Diagnosis Date Noted Date Resolved POA     Assessment and Plan:     1. Chronic Kidney Disease, Stage 4              4/2/2018: BUN/crea 47/2.6. CrCl 20.              Appears to be major issue.     2. Heart Failure, Diastolic, Acute on Chronic              4/2/2018: Normal left ventricular size with low normal to mildly depressed systolic function. EF 50-55%. Severe LVH. Moderate diastolic dysfunction. Moderate to severe AS - 3.3 m/s - 0.9 cm2. SPAP 52 mmHg. Trivial pericardial effusion.               Probably low flow moderate to severe AS with normal EF.              Doubt AS much of a cause for her condition but diastolic dysfunction advanced.     3. Hypertension              Severe and accelerated.              1/18/2018:  U/S Renal: Right Renal Artery: Poorly visualized ostium. Peak velocity 0.64 m/s.               3/20/2018: U/S Renal: Right Renal Artery: Peak velocity 0.62 m/s.   4/4/2018: U/S Renal: Right Renal Artery: Peak velocity 0.79 m/s.               That the above two studies do not reveal any increased velocity in the right renal artery makes significant renal artery stenosis as a cause for her severe hypertension and rise in BUN/crea unlikely.              I would not favor angiography.              On carvedilol 25 mg Q12, hydralazine 100 mg Q8 and nifedipine 60 mg Q24..   Discussed management of her severe hypertension with Dr. Fallon.      4. Hypercholesterolemia              On rosuvastatin 10 mg Q24.      VTE Risk Mitigation         Ordered     heparin (porcine) injection 5,000 Units  Every 8 hours     Route:  Subcutaneous        04/01/18 0527     Place NADER hose  Until discontinued      04/01/18 0527     Place sequential compression device  Until discontinued      04/01/18 0527     IP VTE HIGH RISK PATIENT  Once      04/01/18 0527          Risa Lea MD  Cardiology  [facility]

## 2018-04-04 NOTE — EICU
BG low 47, asymptomatic.  K 6.2, creat 2.2    Plan:  - Dextrose 25% stat to be followed by regular insulin 3 units IV stat  - follow POC q 1 hr for 3 hrs.

## 2018-04-05 PROBLEM — E26.9 HYPERALDOSTERONISM: Chronic | Status: ACTIVE | Noted: 2018-03-21

## 2018-04-05 LAB
ANION GAP SERPL CALC-SCNC: 9 MMOL/L
BASOPHILS # BLD AUTO: 0.01 K/UL
BASOPHILS NFR BLD: 0.2 %
BNP SERPL-MCNC: 3159 PG/ML
BUN SERPL-MCNC: 51 MG/DL
CALCIUM SERPL-MCNC: 8.5 MG/DL
CHLORIDE SERPL-SCNC: 106 MMOL/L
CHLORIDE UR-SCNC: 63 MMOL/L
CK SERPL-CCNC: 103 U/L
CO2 SERPL-SCNC: 17 MMOL/L
CREAT SERPL-MCNC: 2.5 MG/DL
CREAT UR-MCNC: 87.8 MG/DL
DIFFERENTIAL METHOD: ABNORMAL
EOSINOPHIL # BLD AUTO: 0 K/UL
EOSINOPHIL NFR BLD: 0.4 %
ERYTHROCYTE [DISTWIDTH] IN BLOOD BY AUTOMATED COUNT: 14.4 %
EST. GFR  (AFRICAN AMERICAN): 21 ML/MIN/1.73 M^2
EST. GFR  (NON AFRICAN AMERICAN): 19 ML/MIN/1.73 M^2
GLUCOSE SERPL-MCNC: 79 MG/DL
HCT VFR BLD AUTO: 25.3 %
HGB BLD-MCNC: 8.3 G/DL
LYMPHOCYTES # BLD AUTO: 1.2 K/UL
LYMPHOCYTES NFR BLD: 24.8 %
MAGNESIUM SERPL-MCNC: 1.7 MG/DL
MCH RBC QN AUTO: 26 PG
MCHC RBC AUTO-ENTMCNC: 32.8 G/DL
MCV RBC AUTO: 79 FL
MONOCYTES # BLD AUTO: 0.5 K/UL
MONOCYTES NFR BLD: 9.4 %
NEUTROPHILS # BLD AUTO: 3.2 K/UL
NEUTROPHILS NFR BLD: 65.2 %
OSMOLALITY UR: 448 MOSM/KG
PHOSPHATE SERPL-MCNC: 4.7 MG/DL
PLATELET # BLD AUTO: 243 K/UL
PMV BLD AUTO: 9.2 FL
POTASSIUM SERPL-SCNC: 5.7 MMOL/L
POTASSIUM UR-SCNC: 39 MMOL/L
RBC # BLD AUTO: 3.19 M/UL
SODIUM SERPL-SCNC: 132 MMOL/L
SODIUM UR-SCNC: 64 MMOL/L
UUN UR-MCNC: 603 MG/DL
WBC # BLD AUTO: 4.87 K/UL

## 2018-04-05 PROCEDURE — 80048 BASIC METABOLIC PNL TOTAL CA: CPT

## 2018-04-05 PROCEDURE — 25000003 PHARM REV CODE 250: Performed by: NURSE PRACTITIONER

## 2018-04-05 PROCEDURE — 83880 ASSAY OF NATRIURETIC PEPTIDE: CPT

## 2018-04-05 PROCEDURE — 63600175 PHARM REV CODE 636 W HCPCS: Performed by: NURSE PRACTITIONER

## 2018-04-05 PROCEDURE — 84100 ASSAY OF PHOSPHORUS: CPT

## 2018-04-05 PROCEDURE — 84300 ASSAY OF URINE SODIUM: CPT

## 2018-04-05 PROCEDURE — 82570 ASSAY OF URINE CREATININE: CPT

## 2018-04-05 PROCEDURE — 82088 ASSAY OF ALDOSTERONE: CPT

## 2018-04-05 PROCEDURE — 83735 ASSAY OF MAGNESIUM: CPT

## 2018-04-05 PROCEDURE — 25000003 PHARM REV CODE 250: Performed by: INTERNAL MEDICINE

## 2018-04-05 PROCEDURE — 84540 ASSAY OF URINE/UREA-N: CPT

## 2018-04-05 PROCEDURE — 11000001 HC ACUTE MED/SURG PRIVATE ROOM

## 2018-04-05 PROCEDURE — 84133 ASSAY OF URINE POTASSIUM: CPT

## 2018-04-05 PROCEDURE — 99232 SBSQ HOSP IP/OBS MODERATE 35: CPT | Mod: ,,, | Performed by: HOSPITALIST

## 2018-04-05 PROCEDURE — 85025 COMPLETE CBC W/AUTO DIFF WBC: CPT

## 2018-04-05 PROCEDURE — 83935 ASSAY OF URINE OSMOLALITY: CPT

## 2018-04-05 PROCEDURE — 82550 ASSAY OF CK (CPK): CPT

## 2018-04-05 PROCEDURE — 82436 ASSAY OF URINE CHLORIDE: CPT

## 2018-04-05 PROCEDURE — 36415 COLL VENOUS BLD VENIPUNCTURE: CPT

## 2018-04-05 RX ORDER — HYDRALAZINE HYDROCHLORIDE 25 MG/1
100 TABLET, FILM COATED ORAL EVERY 12 HOURS
Status: DISCONTINUED | OUTPATIENT
Start: 2018-04-05 | End: 2018-04-06 | Stop reason: HOSPADM

## 2018-04-05 RX ORDER — SODIUM CITRATE AND CITRIC ACID MONOHYDRATE 334; 500 MG/5ML; MG/5ML
30 SOLUTION ORAL ONCE
Status: COMPLETED | OUTPATIENT
Start: 2018-04-05 | End: 2018-04-05

## 2018-04-05 RX ORDER — FUROSEMIDE 40 MG/1
40 TABLET ORAL DAILY
Status: DISCONTINUED | OUTPATIENT
Start: 2018-04-05 | End: 2018-04-06 | Stop reason: HOSPADM

## 2018-04-05 RX ADMIN — ROSUVASTATIN CALCIUM 10 MG: 10 TABLET, FILM COATED ORAL at 09:04

## 2018-04-05 RX ADMIN — CARVEDILOL 25 MG: 12.5 TABLET, FILM COATED ORAL at 08:04

## 2018-04-05 RX ADMIN — HEPARIN SODIUM 5000 UNITS: 5000 INJECTION, SOLUTION INTRAVENOUS; SUBCUTANEOUS at 01:04

## 2018-04-05 RX ADMIN — HYDRALAZINE HYDROCHLORIDE 100 MG: 25 TABLET, FILM COATED ORAL at 09:04

## 2018-04-05 RX ADMIN — ACETAMINOPHEN 650 MG: 325 TABLET ORAL at 09:04

## 2018-04-05 RX ADMIN — NIFEDIPINE 60 MG: 30 TABLET, FILM COATED, EXTENDED RELEASE ORAL at 08:04

## 2018-04-05 RX ADMIN — CARVEDILOL 25 MG: 12.5 TABLET, FILM COATED ORAL at 09:04

## 2018-04-05 RX ADMIN — FAMOTIDINE 20 MG: 20 TABLET, FILM COATED ORAL at 08:04

## 2018-04-05 RX ADMIN — LEVOTHYROXINE SODIUM 75 MCG: 75 TABLET ORAL at 05:04

## 2018-04-05 RX ADMIN — HYDROXYCHLOROQUINE SULFATE 200 MG: 200 TABLET, FILM COATED ORAL at 08:04

## 2018-04-05 RX ADMIN — FUROSEMIDE 40 MG: 40 TABLET ORAL at 05:04

## 2018-04-05 RX ADMIN — SODIUM BICARBONATE 650 MG TABLET 1300 MG: at 08:04

## 2018-04-05 RX ADMIN — ASPIRIN 81 MG: 81 TABLET, COATED ORAL at 09:04

## 2018-04-05 RX ADMIN — HYDRALAZINE HYDROCHLORIDE 100 MG: 25 TABLET ORAL at 06:04

## 2018-04-05 RX ADMIN — HEPARIN SODIUM 5000 UNITS: 5000 INJECTION, SOLUTION INTRAVENOUS; SUBCUTANEOUS at 05:04

## 2018-04-05 RX ADMIN — HEPARIN SODIUM 5000 UNITS: 5000 INJECTION, SOLUTION INTRAVENOUS; SUBCUTANEOUS at 09:04

## 2018-04-05 RX ADMIN — LATANOPROST 1 DROP: 50 SOLUTION OPHTHALMIC at 09:04

## 2018-04-05 RX ADMIN — HYDROXYCHLOROQUINE SULFATE 200 MG: 200 TABLET, FILM COATED ORAL at 09:04

## 2018-04-05 RX ADMIN — CITALOPRAM HYDROBROMIDE 30 MG: 20 TABLET ORAL at 08:04

## 2018-04-05 RX ADMIN — SODIUM CITRATE AND CITRIC ACID MONOHYDRATE 30 ML: 500; 334 SOLUTION ORAL at 08:04

## 2018-04-05 RX ADMIN — SODIUM BICARBONATE 650 MG TABLET 1300 MG: at 09:04

## 2018-04-05 NOTE — PROGRESS NOTES
[facility] Hospital Medicine  Progress Note    Patient Name: Ewelina Arnold  MRN: [id number]  Patient Class: IP- Inpatient   Admission Date: 4/1/2018  Length of Stay: 4 days  Attending Physician: Dominick Maradiaga MD  Primary Care Provider: Kalie Walton MD        Subjective:     Principal Problem:Acute kidney injury superimposed on chronic kidney disease    HPI:  This is a 72 y.o. Female, with a history of CKD, CHF, and Lupus, who presents with complaint of shortness of breath that began two days ago. Patient was admitted from 3/19 to 3/28 for MARYAM and emergent hypertension. She reports leg swelling that has worsened since discharge. She reports associated abdominal distension and decreased urination. She reports shortness of breath worsens with exertion. She denies fever, chills, cough, chest pain, dysuria, urinary urgency, or urinary frequency. Patient states she only has one kidney because she donated the other.     Hospital Course:  Patient is a 72 year-old woman with hypertension, hyperaldosteronism, and chronic kidney disease stage IV who was recently discharged for the hospital following multiple days of adjustments with her antihypertensive regimen with goal of achieving reasonable blood pressure control along with stable kidney function.  Patient's serum creatinine had been stable (1.6 mg/dL on discharge) but she returned to the emergency department with shortness of breath and also visual hallucinations which she attributes to clonidine.  Laboratory studies reveal acute kidney injury (serum creatinine 2.9 mg/dL) along with hyperkalemia (5.4 mmol/L.  Improving with fluid challenge.  Nephrology consulted.    Interval History:   Feeling better today.  Tolerating PO.  BP improved on Procardia.  Discussed with Joey Barnard and Dr. Lea.     Review of Systems   Constitutional: Negative for diaphoresis and fever.   Eyes: Negative for discharge and visual disturbance.    Respiratory: Negative for cough and shortness of breath.    Cardiovascular: Negative for chest pain and palpitations.   Gastrointestinal: Negative for abdominal pain and nausea.   Genitourinary: Negative for difficulty urinating and dysuria.   Musculoskeletal: Negative for arthralgias and myalgias.   Skin: Negative for color change and rash.   Neurological: Negative for dizziness, numbness and headaches.   Psychiatric/Behavioral: Negative for agitation and confusion.     Objective:     Vital Signs (Most Recent):  Temp: 98.2 °F (36.8 °C) (04/05/18 0305)  Pulse: 72 (04/05/18 0600)  Resp: (!) 25 (04/05/18 0600)  BP: (!) 155/69 (04/05/18 0600)  SpO2: 96 % (04/05/18 0600) Vital Signs (24h Range):  Temp:  [97.8 °F (36.6 °C)-98.2 °F (36.8 °C)] 98.2 °F (36.8 °C)  Pulse:  [58-72] 72  Resp:  [14-32] 25  SpO2:  [92 %-100 %] 96 %  BP: (126-186)/(60-87) 155/69     Weight: 53.9 kg (118 lb 13.3 oz)  Body mass index is 19.77 kg/m².    Intake/Output Summary (Last 24 hours) at 04/05/18 0746  Last data filed at 04/04/18 2100   Gross per 24 hour   Intake              650 ml   Output                0 ml   Net              650 ml      Physical Exam   Constitutional: She is oriented to person, place, and time. She appears well-developed and well-nourished.   HENT:   Head: Normocephalic and atraumatic.   Eyes: Conjunctivae are normal. Pupils are equal, round, and reactive to light.   Neck: Normal range of motion. Neck supple.   Cardiovascular: Regular rhythm and normal heart sounds.    Pulmonary/Chest: Effort normal and breath sounds normal.   Abdominal: Soft. Bowel sounds are normal.   Musculoskeletal: She exhibits no edema or tenderness.   Neurological: She is alert and oriented to person, place, and time.   Skin: Skin is warm and dry.       Significant Labs:   BMP:   Recent Labs  Lab 04/05/18  0329   GLU 79   *   K 5.7*      CO2 17*   BUN 51*   CREATININE 2.5*   CALCIUM 8.5*   MG 1.7     CBC:   Recent Labs  Lab  04/04/18  0359 04/05/18  0329   WBC 3.93 4.87   HGB 8.7* 8.3*   HCT 27.1* 25.3*    243     Magnesium:   Recent Labs  Lab 04/04/18  0359 04/05/18  0329   MG 1.7 1.7       Significant Imaging: I have reviewed all pertinent imaging results/findings within the past 24 hours.   Imaging Results          X-Ray Chest 1 View (Final result)  Result time 04/01/18 02:43:29    Final result by Miguel Angel Mendes MD (04/01/18 02:43:29)                 Impression:      Stable cardiomegaly.  Stable trace residual right pleural fluid and/or pleural thickening extending into the base of the major fissure.    No significant change from prior study.      Electronically signed by: Miguel Angel Mendes MD  Date:    04/01/2018  Time:    02:43             Narrative:    EXAMINATION:  XR CHEST 1 VIEW    CLINICAL HISTORY:  Other forms of dyspnea    TECHNIQUE:  Single frontal view of the chest was performed.    COMPARISON:  January 15, 2018    FINDINGS:  Stable cardiomegaly.  Stable trace residual right pleural fluid and/or pleural thickening extending into the base of the major fissure.    Heart and lungs  appear unchanged when allowing for differences in technique and positioning.                              Assessment/Plan:      * Acute kidney injury superimposed on chronic kidney disease stage IV    - Patient discharged on 3/28/2018 with a creatinine of 1.6 mg/dL and presented now with 2.9 mg/dL and with hyperkalemia.    - Spironolactone held.   - US 4/2 shows right medical renal disease.   - Renal Artery US 3/20/18 showed: No significant increased velocity within the visualize the main renal artery.  There is aortic atherosclerotic calcification present.   - Renal vessel US 4/4 not much evidence FRAN noted on US 1/2018, not noted on repeat imaging 3/2018.  - Nephrology following.           Renovascular hypertension    - US 4/4/18 noted: Minimally elevated resistive index in the upper pole segmental right renal artery.  This can be seen  in chronic medical renal disease however, the middle and lower segmental resistive indices are normal.  This exam is otherwise normal.  - Procardia 60 added yesterday with improved BP control noted.  - Procardia 60, hydralazine 100 q8, Coreg 25               Hyperkalemia    - Discussed with nephrology and likely due to metabolic acidosis.  - Got D50 and insulin overnight by eICU.  - Bicarbonate dose increased.             Metabolic acidosis, normal anion gap (NAG)    - Sodium bicarbonate BID per nephrology.   - Monitor.          History of unilateral nephrectomy LEFT    - Noted on imaging.             MARYAM (acute kidney injury)              Hyperaldosteronism    - Unable to tolerate spirolactone.    - Hold off on spirolactone for now.  - Sodium is low and potassium high with spironolactone held.          Chronic diastolic congestive heart failure    - Despite elevated BNP, does not appear acute decompensated.    - Continue with medical therapy as tolerated.  - Cardiology following.              Hypertensive crisis    - Difficult to control.  Patient reports hallucinations with clonidine.   - Despite high dose Coreg, hydralazine 100 q 8, BP still high.   - Cardene drip helped so after long discussion with patient restarting Procardia.    - Discussed with nephrology.        Microcytic anemia    - Chronic microcytic anemia.   - Possibly due to occult blood loss and also due to chronic kidney disease.   - States had a history of colon CA and last colonoscopy was about a year ago and was okay.  - Defer to PCP.           Dyslipidemia    - Continue with statin therapy.          Hypothyroidism    - TSH at goal.    - Continue levothyroxine.          Other forms of systemic lupus erythematosus    - Hydroxychlorquine             VTE Risk Mitigation         Ordered     heparin (porcine) injection 5,000 Units  Every 8 hours     Route:  Subcutaneous        04/01/18 0527     Place NADER hose  Until discontinued      04/01/18 0527      Place sequential compression device  Until discontinued      04/01/18 0527     IP VTE HIGH RISK PATIENT  Once      04/01/18 0527              Dominick Maradiaga MD  Department of Hospital Medicine   [facility]

## 2018-04-05 NOTE — NURSING TRANSFER
Nursing Transfer Note      4/5/2018     Transfer To: 320    Transfer via wheelchair     Transfer with cardiac monitoring    Transported by Kavon Sanchez RN    Medicines sent: none    Chart send with patient: Yes    Notified: daughter    Patient reassessed at: 4/5, 1130 (date, time)    Upon arrival to floor: cardiac monitor applied, patient oriented to room, call bell in reach and bed in lowest position

## 2018-04-05 NOTE — ASSESSMENT & PLAN NOTE
- Patient discharged on 3/28/2018 with a creatinine of 1.6 mg/dL and presented now with 2.9 mg/dL and with hyperkalemia.    - Spironolactone held.   - US 4/2 shows right medical renal disease.   - Renal Artery US 3/20/18 showed: No significant increased velocity within the visualize the main renal artery.  There is aortic atherosclerotic calcification present.   - Renal vessel US 4/4 not much evidence FRAN noted on US 1/2018, not noted on repeat imaging 3/2018.  - Nephrology following.

## 2018-04-05 NOTE — PLAN OF CARE
04/05/18 0840   Medicare Message   Important Message from Medicare regarding Discharge Appeal Rights Given to patient/caregiver;Explained to patient/caregiver;Signed/date by patient/caregiver   Date IMM was signed 04/05/18   Time IMM was signed 0800

## 2018-04-05 NOTE — SUBJECTIVE & OBJECTIVE
Interval History:   Feeling better today.  Tolerating PO.  BP improved on Procardia.  Discussed with Joey Barnard and Dr. Lea.     Review of Systems   Constitutional: Negative for diaphoresis and fever.   Eyes: Negative for discharge and visual disturbance.   Respiratory: Negative for cough and shortness of breath.    Cardiovascular: Negative for chest pain and palpitations.   Gastrointestinal: Negative for abdominal pain and nausea.   Genitourinary: Negative for difficulty urinating and dysuria.   Musculoskeletal: Negative for arthralgias and myalgias.   Skin: Negative for color change and rash.   Neurological: Negative for dizziness, numbness and headaches.   Psychiatric/Behavioral: Negative for agitation and confusion.     Objective:     Vital Signs (Most Recent):  Temp: 98.2 °F (36.8 °C) (04/05/18 0305)  Pulse: 72 (04/05/18 0600)  Resp: (!) 25 (04/05/18 0600)  BP: (!) 155/69 (04/05/18 0600)  SpO2: 96 % (04/05/18 0600) Vital Signs (24h Range):  Temp:  [97.8 °F (36.6 °C)-98.2 °F (36.8 °C)] 98.2 °F (36.8 °C)  Pulse:  [58-72] 72  Resp:  [14-32] 25  SpO2:  [92 %-100 %] 96 %  BP: (126-186)/(60-87) 155/69     Weight: 53.9 kg (118 lb 13.3 oz)  Body mass index is 19.77 kg/m².    Intake/Output Summary (Last 24 hours) at 04/05/18 0746  Last data filed at 04/04/18 2100   Gross per 24 hour   Intake              650 ml   Output                0 ml   Net              650 ml      Physical Exam   Constitutional: She is oriented to person, place, and time. She appears well-developed and well-nourished.   HENT:   Head: Normocephalic and atraumatic.   Eyes: Conjunctivae are normal. Pupils are equal, round, and reactive to light.   Neck: Normal range of motion. Neck supple.   Cardiovascular: Regular rhythm and normal heart sounds.    Pulmonary/Chest: Effort normal and breath sounds normal.   Abdominal: Soft. Bowel sounds are normal.   Musculoskeletal: She exhibits no edema or tenderness.   Neurological: She is alert and  oriented to person, place, and time.   Skin: Skin is warm and dry.       Significant Labs:   BMP:   Recent Labs  Lab 04/05/18 0329   GLU 79   *   K 5.7*      CO2 17*   BUN 51*   CREATININE 2.5*   CALCIUM 8.5*   MG 1.7     CBC:   Recent Labs  Lab 04/04/18 0359 04/05/18 0329   WBC 3.93 4.87   HGB 8.7* 8.3*   HCT 27.1* 25.3*    243     Magnesium:   Recent Labs  Lab 04/04/18 0359 04/05/18 0329   MG 1.7 1.7       Significant Imaging: I have reviewed all pertinent imaging results/findings within the past 24 hours.   Imaging Results          X-Ray Chest 1 View (Final result)  Result time 04/01/18 02:43:29    Final result by Miguel Angel Mendes MD (04/01/18 02:43:29)                 Impression:      Stable cardiomegaly.  Stable trace residual right pleural fluid and/or pleural thickening extending into the base of the major fissure.    No significant change from prior study.      Electronically signed by: Miguel Angel Mendes MD  Date:    04/01/2018  Time:    02:43             Narrative:    EXAMINATION:  XR CHEST 1 VIEW    CLINICAL HISTORY:  Other forms of dyspnea    TECHNIQUE:  Single frontal view of the chest was performed.    COMPARISON:  January 15, 2018    FINDINGS:  Stable cardiomegaly.  Stable trace residual right pleural fluid and/or pleural thickening extending into the base of the major fissure.    Heart and lungs  appear unchanged when allowing for differences in technique and positioning.

## 2018-04-05 NOTE — PLAN OF CARE
Problem: Patient Care Overview  Goal: Plan of Care Review  Outcome: Ongoing (interventions implemented as appropriate)  Patient AAOX4 no noted acute distress.Denies any pain.edema noted to feet and flank area.Patient 24 hr urine start at 10 am .patient has very limited urine OP noted.will cont to assess.

## 2018-04-05 NOTE — ASSESSMENT & PLAN NOTE
- US 4/4/18 noted: Minimally elevated resistive index in the upper pole segmental right renal artery.  This can be seen in chronic medical renal disease however, the middle and lower segmental resistive indices are normal.  This exam is otherwise normal.  - Procardia 60 added yesterday with improved BP control noted.  - Procardia 60, hydralazine 100 q8, Coreg 25

## 2018-04-05 NOTE — PHYSICIAN QUERY
PT Name: Ewelina Arnold  MR #: [id number]     Physician Query Form - Documentation Clarification      CDS/: Daksha Ragsdale RN              Contact information: [phone]    This form is a permanent document in the medical record.     Query Date: April 5, 2018    By submitting this query, we are merely seeking further clarification of documentation. Please utilize your independent clinical judgment when addressing the question(s) below.    The Medical record reflects the following:    Supporting Clinical Findings Location in Medical Record   Bun/Cr=  49/ 2.9    GFR=16    Acute Kidney Injury    Hypertensive crisis -   Difficult to control.   Patient reports hallucinations with clonidine.   - Despite high dose Coreg, hydralazine 100 q 8, BP still high.   - Cardene drip helped so after long discussion with patient restarting Procardia.       Called to room for Acc HTN.  Pt seen and examined.  C/o SOB and some chest tightness.  Discussed with attending and team.  Will move to ICU for cardene drip and monitoring     4/1 Lab    4/5 Hosp med note                4/3 MD note   BP= 226/105, 249/115, 233/102, 242/106, 227/106, 242/104    Nicardipine gtt  Hydralazine 10mg IVP x5 doses  Hydralazine 100mg po  Hydralazine 50mg     4/3 VS flowsheet    4/3- 4/5 MAR  4/1- 4/3 MAR  4/3- 4/5 MAR  4/1-4/3                                                                            Doctor, Please specify diagnosis or diagnoses associated with above clinical findings.                                                                                                          [ X  ] Hypertensive Emergency    [   ] other___________________________________    [  ] Clinically undetermined

## 2018-04-05 NOTE — PHYSICIAN QUERY
PT Name: Ewelina Arnold  MR #: [id number]    Physician Query Form - Consultant Diagnosis Clarification     CDS/: Daksha Ragsdale Rn              Contact information: [phone]  This form is a permanent document in the medical record.     Query Date: April 5, 2018      By submitting this query, we are merely seeking further clarification of documentation.  Please utilize your independent clinical judgment when addressing the question(s) below.      The Medical record contains the following:   Diagnosis Supporting Clinical Information Location in Medical Record   Acute on Chronic Diastolic Heart Failure         Chronic diastolic heart failure      Heart Failure, Diastolic, Acute on Chronic  4/2/2018: Normal left ventricular size with low normal to mildly depressed systolic function.  EF 50-55%.   Severe LVH.  Moderate diastolic dysfunction.  Moderate to severe AS - 3.3 m/s - 0.9 cm2. SPAP 52 mmHg.  Trivial pericardial effusion.   Probably low flow moderate to severe AS with normal EF.  Doubt AS much of a cause for her condition but diastolic dysfunction advanced.    Shortness of breath  She comes back in due to worsening dyspnea with some edema of her legs  -Positive for SOB. Dyspnea on exertion, leg swelling and orthopnea      BNP= 2608    Lasix 40mg IVP  Coreg 25mg 4/4 Cards note      4/5 Hosp med note      4/4 Cards note                            4/2 Cards consult          4/1 Lab    4/1 MAR  4/1- 4/5 MAR         Do you agree with the Consultants diagnosis of Acute on Chronic Diastolic Heart Failure?                 [   ]   Yes               [X   ]   Possibly considering I didn't see patient at admit, but it resolved prior to my assessment of the patient               [   ]   No                [   ]   Clinically insignificant               [ ]   Clinically undetermined               [   ]   Other/Clarification of findings: ___________________________________________

## 2018-04-05 NOTE — PROGRESS NOTES
Progress Note  [facility]    Admit Date: 4/1/2018   LOS: 4 days     SUBJECTIVE:     Follow-up For:      Interval History:     Uneventful night.  BP much improved.  Discussed with Dr. Maradiaga.  No CP/SOB.       Review of Systems:  Constitutional: No fever or chills  Respiratory:  shortness of breath better  Cardiovascular: tightness improved  Gastrointestinal: No nausea or vomiting  Neurological: No confusion or weakness    OBJECTIVE:     Vital Signs Range (Last 24H):  Vitals:    04/05/18 0600   BP: (!) 155/69   Pulse: 72   Resp: (!) 25   Temp:        Temp:  [97.8 °F (36.6 °C)-98.2 °F (36.8 °C)]   Pulse:  [58-72]   Resp:  [14-32]   BP: (126-186)/(60-87)   SpO2:  [92 %-100 %]     I & O (Last 24H):    Intake/Output Summary (Last 24 hours) at 04/05/18 0811  Last data filed at 04/04/18 2100   Gross per 24 hour   Intake              650 ml   Output                0 ml   Net              650 ml       Physical Exam:  General appearance: NAD.   Eyes:  Conjunctivae/corneas clear. PERRL.  Lungs: Normal respiratory effort,   clear to auscultation bilaterally   Heart: evelyn/Regular rate and rhythm, S1, S2 normal, no murmur, rub or ruma.  Abdomen: Soft, non-tender non-distended; bowel sounds normal; no masses,  no organomegaly  Extremities: No cyanosis or clubbing. no edema.  DP pulses 2+/4+ bilaterally.  Skin: Skin color, texture, turgor normal. No rashes or lesions  Neurologic: Normal strength and tone. No focal numbness or weakness   Parsons    Laboratory Data:      Recent Labs  Lab 04/05/18  0329   *   K 5.7*      CO2 17*   BUN 51*   CREATININE 2.5*   CALCIUM 8.5*   PHOS 4.7*     Lab Results   Component Value Date    .0 (H) 11/21/2016    CALCIUM 8.5 (L) 04/05/2018    PHOS 4.7 (H) 04/05/2018     Lab Results   Component Value Date    IRON 32 04/02/2018    TIBC 250 04/02/2018    FERRITIN 261 04/04/2018       Medications:  Medication list was reviewed and changes noted under Assessment/Plan.    Diagnostic  Results:    Reviewed most recent CT/US/Echo/MRI    CONCLUSIONS     1 - Concentric hypertrophy.     2 - Low normal to mildly depressed left ventricular systolic function (EF 50-55%).     3 - No wall motion abnormalities.     4 - Impaired LV relaxation, elevated LAP (grade 2 diastolic dysfunction).     5 - Biatrial enlargement.     6 - Moderate to severe aortic stenosis, PABLITO = 0.94 cm2, AVAi = 0.59 cm2/m2, peak velocity = 3.27 m/s, mean gradient = 16 mmHg.     7 - Mild aortic regurgitation.     8 - Mild to moderate mitral regurgitation.     9 - Pulmonary hypertension. The estimated PA systolic pressure is 52 mmHg.     10 - Trivial pericardial effusion.       ASSESSMENT/PLAN:     Resolved Hallucinations  -patient thinks it may have been due to clonidine.      Labile nonoliguric MARYAM on CKD IV (Baseline variable 1.8) with hypertensive heart and acute on chronic diastolic HTN/ Acc HTN (N17.9, N18.4, I13.0, I50.33):  now with likely degree of ATN from BP fluctuations.  Followed by Dr. Epperson at [facility] in past but she has switched to Dr. Brizuela.  Prior renal arterial doppler with some evidence of FRAN but repeat US (x2) reviewed by Dr. Lea does not show significant stenosis last month.  -No ACE/ARB/Syd  -Renally dose meds, avoid nephrotoxins, and monitor I/O's closely.  - Start Lasix 40mg/d.  This will help with peripheral edema, BP control, and kaliuresis.     Metabolic acidosis/Hyperkalemia (E87.2, E87.5)  -increased po bicarb tabs.  Gave chlorthalidone 4/3.    -low K diet.  -insulin/D50 given by EICU.  -suspect degree of RTA.  Checking urine studies.   -not consistent with hyperaldo????  Will check for hyperaldo again as outpatient--she will need to be off all diuretics and RAAS blockers for a month to allow time for wash-out.  -BB causing >K ???       Edema  -secondary to CCB.   -advised about common findings with this drug class    AS (I35.0):  -defer       Acc HTN:  -transfered to ICU for Cardene drip  4/3.  -weaned off.  -Coreg, Hydralazine, and restarted procardia.  Risk and SE profile reviewed.  -Much improved and would like to keep -160's.  -only other option would be Minoxidil.          H/o Bradycardia (R00.1):  Pt was on high dose labetolol, and developed veelyn (HR 30-40s).  Held agents and HR improved  to 80's- 100's.         1o Hyperaldosteronism? (E26.9):  Ironic that she now has hyperkalemia.  Will recheck cydney/renin.       SLE (M32.9):  Continue plaquenil.  Defer. Anti-histone negative.       Anemia: may be due to sle vs other. Defer to hospitalist for w/u. Iron is low. Stool positive for blood       Chest tightness (R07.89):  -resolved.  -EKG/Trop negative.  -Dr. Lea following.         See above  Very complex lady.    Ambulate and monitor BP.   Ok to transfer to tele.

## 2018-04-05 NOTE — PLAN OF CARE
Problem: Patient Care Overview  Goal: Plan of Care Review  Outcome: Ongoing (interventions implemented as appropriate)  Pt awake and alert. OOB without assistance. VSS. No complaints overnight.

## 2018-04-05 NOTE — ASSESSMENT & PLAN NOTE
- Unable to tolerate spirolactone.    - Hold off on spirolactone for now.  - Sodium is low and potassium high with spironolactone held.

## 2018-04-05 NOTE — PHYSICIAN QUERY
"PT Name: Ewelina Arnold  MR #: [id number]     Physician Query Form - Documentation Clarification      CDS/: Daksha Ragsdale RN               Contact information: [phone]    This form is a permanent document in the medical record.     Query Date: April 5, 2018    By submitting this query, we are merely seeking further clarification of documentation. Please utilize your independent clinical judgment when addressing the question(s) below.    The Medical record reflects the following:    Supporting Clinical Findings Location in Medical Record   BMI= 19.8  Wt 53.9 Kg  Ht 5'5"     4/5 Flowsheet                                                                            Doctor, Please specify diagnosis or diagnoses associated with above clinical findings.                                                                                                          [ X  ] Underweight    [   ] other________________________________________________    [  ] Clinically undetermined            "

## 2018-04-06 VITALS
RESPIRATION RATE: 18 BRPM | SYSTOLIC BLOOD PRESSURE: 158 MMHG | HEIGHT: 65 IN | TEMPERATURE: 98 F | DIASTOLIC BLOOD PRESSURE: 74 MMHG | BODY MASS INDEX: 19.79 KG/M2 | HEART RATE: 70 BPM | WEIGHT: 118.81 LBS | OXYGEN SATURATION: 95 %

## 2018-04-06 PROBLEM — I16.9 HYPERTENSIVE CRISIS: Status: RESOLVED | Noted: 2018-03-19 | Resolved: 2018-04-06

## 2018-04-06 LAB
ALDOST SERPL-MCNC: 10.2 NG/DL
ALDOST/RENIN PLAS-RTO: NORMAL RATIO
ANION GAP SERPL CALC-SCNC: 10 MMOL/L
BASOPHILS # BLD AUTO: 0.02 K/UL
BASOPHILS NFR BLD: 0.5 %
BUN SERPL-MCNC: 56 MG/DL
CALCIUM SERPL-MCNC: 8.6 MG/DL
CHLORIDE SERPL-SCNC: 105 MMOL/L
CO2 SERPL-SCNC: 18 MMOL/L
CREAT SERPL-MCNC: 3.1 MG/DL
DIFFERENTIAL METHOD: ABNORMAL
EOSINOPHIL # BLD AUTO: 0.1 K/UL
EOSINOPHIL NFR BLD: 1.9 %
ERYTHROCYTE [DISTWIDTH] IN BLOOD BY AUTOMATED COUNT: 14.5 %
EST. GFR  (AFRICAN AMERICAN): 17 ML/MIN/1.73 M^2
EST. GFR  (NON AFRICAN AMERICAN): 14 ML/MIN/1.73 M^2
GLUCOSE SERPL-MCNC: 72 MG/DL
HCT VFR BLD AUTO: 22.8 %
HGB BLD-MCNC: 7.4 G/DL
LYMPHOCYTES # BLD AUTO: 1.4 K/UL
LYMPHOCYTES NFR BLD: 32.2 %
MAGNESIUM SERPL-MCNC: 1.7 MG/DL
MCH RBC QN AUTO: 25.7 PG
MCHC RBC AUTO-ENTMCNC: 32.5 G/DL
MCV RBC AUTO: 79 FL
MONOCYTES # BLD AUTO: 0.7 K/UL
MONOCYTES NFR BLD: 16.9 %
NEUTROPHILS # BLD AUTO: 2 K/UL
NEUTROPHILS NFR BLD: 48.3 %
PHOSPHATE SERPL-MCNC: 4.6 MG/DL
PLATELET # BLD AUTO: 230 K/UL
PMV BLD AUTO: 9 FL
POTASSIUM SERPL-SCNC: 5.1 MMOL/L
RBC # BLD AUTO: 2.88 M/UL
RENIN PLAS-CCNC: <0.1 NG/ML/HR
SODIUM SERPL-SCNC: 133 MMOL/L
WBC # BLD AUTO: 4.19 K/UL

## 2018-04-06 PROCEDURE — 25000003 PHARM REV CODE 250: Performed by: NURSE PRACTITIONER

## 2018-04-06 PROCEDURE — 36415 COLL VENOUS BLD VENIPUNCTURE: CPT

## 2018-04-06 PROCEDURE — 83735 ASSAY OF MAGNESIUM: CPT

## 2018-04-06 PROCEDURE — 63600175 PHARM REV CODE 636 W HCPCS: Performed by: NURSE PRACTITIONER

## 2018-04-06 PROCEDURE — 99238 HOSP IP/OBS DSCHRG MGMT 30/<: CPT | Mod: ,,, | Performed by: HOSPITALIST

## 2018-04-06 PROCEDURE — 25000003 PHARM REV CODE 250: Performed by: INTERNAL MEDICINE

## 2018-04-06 PROCEDURE — 82088 ASSAY OF ALDOSTERONE: CPT

## 2018-04-06 PROCEDURE — 84100 ASSAY OF PHOSPHORUS: CPT

## 2018-04-06 PROCEDURE — 85025 COMPLETE CBC W/AUTO DIFF WBC: CPT

## 2018-04-06 PROCEDURE — 80048 BASIC METABOLIC PNL TOTAL CA: CPT

## 2018-04-06 RX ORDER — FUROSEMIDE 40 MG/1
40 TABLET ORAL DAILY
Qty: 30 TABLET | Refills: 0 | Status: SHIPPED | OUTPATIENT
Start: 2018-04-07 | End: 2018-04-16 | Stop reason: SDUPTHER

## 2018-04-06 RX ORDER — HYDRALAZINE HYDROCHLORIDE 100 MG/1
100 TABLET, FILM COATED ORAL EVERY 12 HOURS
Qty: 60 TABLET | Refills: 0 | Status: SHIPPED | OUTPATIENT
Start: 2018-04-06 | End: 2018-04-16 | Stop reason: SDUPTHER

## 2018-04-06 RX ORDER — NIFEDIPINE 60 MG/1
60 TABLET, EXTENDED RELEASE ORAL DAILY
Qty: 30 TABLET | Refills: 0 | Status: ON HOLD | OUTPATIENT
Start: 2018-04-07 | End: 2018-04-15

## 2018-04-06 RX ORDER — SODIUM BICARBONATE 650 MG/1
1300 TABLET ORAL 2 TIMES DAILY
Qty: 120 TABLET | Refills: 0 | COMMUNITY
Start: 2018-04-06 | End: 2018-07-27

## 2018-04-06 RX ADMIN — HYDROXYCHLOROQUINE SULFATE 200 MG: 200 TABLET, FILM COATED ORAL at 09:04

## 2018-04-06 RX ADMIN — HEPARIN SODIUM 5000 UNITS: 5000 INJECTION, SOLUTION INTRAVENOUS; SUBCUTANEOUS at 05:04

## 2018-04-06 RX ADMIN — IRON SUCROSE 200 MG: 20 INJECTION, SOLUTION INTRAVENOUS at 11:04

## 2018-04-06 RX ADMIN — FAMOTIDINE 20 MG: 20 TABLET, FILM COATED ORAL at 09:04

## 2018-04-06 RX ADMIN — CITALOPRAM HYDROBROMIDE 30 MG: 20 TABLET ORAL at 09:04

## 2018-04-06 RX ADMIN — LEVOTHYROXINE SODIUM 75 MCG: 75 TABLET ORAL at 05:04

## 2018-04-06 RX ADMIN — SODIUM BICARBONATE 650 MG TABLET 1300 MG: at 09:04

## 2018-04-06 RX ADMIN — FUROSEMIDE 40 MG: 40 TABLET ORAL at 09:04

## 2018-04-06 RX ADMIN — HYDRALAZINE HYDROCHLORIDE 100 MG: 25 TABLET, FILM COATED ORAL at 09:04

## 2018-04-06 RX ADMIN — CARVEDILOL 25 MG: 12.5 TABLET, FILM COATED ORAL at 09:04

## 2018-04-06 RX ADMIN — ASPIRIN 81 MG: 81 TABLET, COATED ORAL at 09:04

## 2018-04-06 RX ADMIN — NIFEDIPINE 60 MG: 30 TABLET, FILM COATED, EXTENDED RELEASE ORAL at 09:04

## 2018-04-06 NOTE — ASSESSMENT & PLAN NOTE
- US 4/4/18 noted: Minimally elevated resistive index in the upper pole segmental right renal artery.  This can be seen in chronic medical renal disease however, the middle and lower segmental resistive indices are normal.  This exam is otherwise normal.  - Procardia 60 added yesterday with improved BP control noted.  - Procardia 60, hydralazine 100 q8, Coreg 25  - Follow up with Dr. Velez nephrology.

## 2018-04-06 NOTE — ASSESSMENT & PLAN NOTE
- Unable to tolerate spirolactone.    - Hold off on spirolactone for now.  - Sodium is actually low and potassium high with spironolactone held.  - Follow up with nephrology outpatient.

## 2018-04-06 NOTE — SUBJECTIVE & OBJECTIVE
Interval History:   Doing well and ready to go home.  Discussed with Joey Barnard.        Review of Systems   Constitutional: Negative for diaphoresis and fever.   Eyes: Negative for discharge and visual disturbance.   Respiratory: Negative for cough and shortness of breath.    Cardiovascular: Negative for chest pain and palpitations.   Gastrointestinal: Negative for abdominal pain and nausea.   Genitourinary: Negative for difficulty urinating and dysuria.   Musculoskeletal: Negative for arthralgias and myalgias.   Skin: Negative for color change and rash.   Neurological: Negative for dizziness, numbness and headaches.   Psychiatric/Behavioral: Negative for agitation and confusion.     Objective:     Vital Signs (Most Recent):  Temp: 98.2 °F (36.8 °C) (04/06/18 0706)  Pulse: 70 (04/06/18 1000)  Resp: 18 (04/06/18 0706)  BP: (!) 158/74 (04/06/18 0706)  SpO2: 95 % (04/06/18 0706) Vital Signs (24h Range):  Temp:  [98.2 °F (36.8 °C)-98.7 °F (37.1 °C)] 98.2 °F (36.8 °C)  Pulse:  [58-81] 70  Resp:  [14-18] 18  SpO2:  [94 %-98 %] 95 %  BP: (122-158)/(60-74) 158/74     Weight: 53.9 kg (118 lb 13.3 oz)  Body mass index is 19.77 kg/m².    Intake/Output Summary (Last 24 hours) at 04/06/18 1301  Last data filed at 04/06/18 0500   Gross per 24 hour   Intake                0 ml   Output              300 ml   Net             -300 ml      Physical Exam   Constitutional: She is oriented to person, place, and time. She appears well-developed and well-nourished.   HENT:   Head: Normocephalic and atraumatic.   Eyes: Conjunctivae are normal. Pupils are equal, round, and reactive to light.   Neck: Normal range of motion. Neck supple.   Cardiovascular: Regular rhythm and normal heart sounds.    Pulmonary/Chest: Effort normal and breath sounds normal.   Abdominal: Soft. Bowel sounds are normal.   Musculoskeletal: She exhibits no edema or tenderness.   Neurological: She is alert and oriented to person, place, and time.   Skin: Skin is  warm and dry.       Significant Labs:   BMP:   Recent Labs  Lab 04/06/18  0443   GLU 72   *   K 5.1      CO2 18*   BUN 56*   CREATININE 3.1*   CALCIUM 8.6*   MG 1.7     CBC:   Recent Labs  Lab 04/05/18  0329 04/06/18  0443   WBC 4.87 4.19   HGB 8.3* 7.4*   HCT 25.3* 22.8*    230     Magnesium:   Recent Labs  Lab 04/05/18  0329 04/06/18  0443   MG 1.7 1.7       Significant Imaging: I have reviewed all pertinent imaging results/findings within the past 24 hours.   Imaging Results          X-Ray Chest 1 View (Final result)  Result time 04/01/18 02:43:29    Final result by Miguel Angel Mendes MD (04/01/18 02:43:29)                 Impression:      Stable cardiomegaly.  Stable trace residual right pleural fluid and/or pleural thickening extending into the base of the major fissure.    No significant change from prior study.      Electronically signed by: Miguel Angel Mendes MD  Date:    04/01/2018  Time:    02:43             Narrative:    EXAMINATION:  XR CHEST 1 VIEW    CLINICAL HISTORY:  Other forms of dyspnea    TECHNIQUE:  Single frontal view of the chest was performed.    COMPARISON:  January 15, 2018    FINDINGS:  Stable cardiomegaly.  Stable trace residual right pleural fluid and/or pleural thickening extending into the base of the major fissure.    Heart and lungs  appear unchanged when allowing for differences in technique and positioning.

## 2018-04-06 NOTE — ASSESSMENT & PLAN NOTE
- Discussed with nephrology and likely due to metabolic acidosis.  - Got D50 and insulin overnight by eICU.  - Bicarbonate dose increased.   - Potassium 5.1 at discharge.

## 2018-04-06 NOTE — PROGRESS NOTES
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 She developed lupus in 1994. Lately accelerated hypertension and rise in BUN/crea. On 1/18/2018 she had a Renal Duplex that did not reveal any increased velocity in the right renal artery but possibly dampened velocities more distally. She had a repeat Renal Duplex on 3/20/2018 that reveled a similar peak velocity in the right renal artery of 0.64 m/s. I was asked to see her at that time for the possibility of FRAN and advised against angiography as I felt FRAN was unlikely. She hasd a lengthy stay and was discharged 3/28/2018. She comes back in due to worsening dyspnea with some edema of her legs. Her renal function has worsened.    Hospital Course:     4/3/2018: Transferred to ICU for nicardipine iv.    Interval History:     Blood pressure better controlled.     Review of Systems   Cardiovascular: Positive for chest pain, leg swelling and orthopnea. Negative for palpitations, paroxysmal nocturnal dyspnea and syncope.   Respiratory: Positive for shortness of breath. Negative for cough, hemoptysis and wheezing.      Objective:     Vital Signs (Most Recent):  Temp: 98.2 °F (36.8 °C) (04/05/18 1948)  Pulse: 63 (04/05/18 1948)  Resp: 18 (04/05/18 1948)  BP: 132/61 (04/05/18 1948)  SpO2: 98 % (04/05/18 1948) Vital Signs (24h Range):  Temp:  [97.9 °F (36.6 °C)-98.7 °F (37.1 °C)] 98.2 °F (36.8 °C)  Pulse:  [58-72] 63  Resp:  [6-32] 18  SpO2:  [92 %-99 %] 98 %  BP: (126-173)/(60-79) 132/61     Weight: 53.9 kg (118 lb  13.3 oz)  Body mass index is 19.77 kg/m².    SpO2: 98 %  O2 Device (Oxygen Therapy): room air      Intake/Output Summary (Last 24 hours) at 04/05/18 1951  Last data filed at 04/05/18 1000   Gross per 24 hour   Intake              650 ml   Output              100 ml   Net              550 ml       Lines/Drains/Airways     Peripheral Intravenous Line                 Peripheral IV - Single Lumen 04/01/18 0215 Right Hand 4 days         Peripheral IV - Single Lumen 04/04/18 0500 Right Antecubital 1 day                Physical Exam   Constitutional: She appears well-developed and well-nourished.   Cardiovascular: Regular rhythm and S2 normal.  Bradycardia present.  Exam reveals gallop and S4.    Pulmonary/Chest: She has rales in the right lower field.   Abdominal: Soft. Normal appearance.       Current Medications:     aspirin  81 mg Oral Daily    carvedilol  25 mg Oral BID    citalopram  30 mg Oral Daily    famotidine  20 mg Oral Daily    furosemide  40 mg Oral Daily    heparin (porcine)  5,000 Units Subcutaneous Q8H    hydrALAZINE  100 mg Oral Q12H    hydroxychloroquine  200 mg Oral BID    latanoprost  1 drop Both Eyes QHS    levothyroxine  75 mcg Oral Before breakfast    NIFEdipine  60 mg Oral Daily    rosuvastatin  10 mg Oral QHS    sodium bicarbonate  2 tablet Oral BID     Current Laboratory Results:    Recent Results (from the past 24 hour(s))   Magnesium    Collection Time: 04/05/18  3:29 AM   Result Value Ref Range    Magnesium 1.7 1.6 - 2.6 mg/dL   Phosphorus    Collection Time: 04/05/18  3:29 AM   Result Value Ref Range    Phosphorus 4.7 (H) 2.7 - 4.5 mg/dL   CBC with Automated Differential    Collection Time: 04/05/18  3:29 AM   Result Value Ref Range    WBC 4.87 3.90 - 12.70 K/uL    RBC 3.19 (L) 4.00 - 5.40 M/uL    Hemoglobin 8.3 (L) 12.0 - 16.0 g/dL    Hematocrit 25.3 (L) 37.0 - 48.5 %    MCV 79 (L) 82 - 98 fL    MCH 26.0 (L) 27.0 - 31.0 pg    MCHC 32.8 32.0 - 36.0 g/dL    RDW 14.4 11.5 - 14.5 %     Platelets 243 150 - 350 K/uL    MPV 9.2 9.2 - 12.9 fL    Gran # (ANC) 3.2 1.8 - 7.7 K/uL    Lymph # 1.2 1.0 - 4.8 K/uL    Mono # 0.5 0.3 - 1.0 K/uL    Eos # 0.0 0.0 - 0.5 K/uL    Baso # 0.01 0.00 - 0.20 K/uL    Gran% 65.2 38.0 - 73.0 %    Lymph% 24.8 18.0 - 48.0 %    Mono% 9.4 4.0 - 15.0 %    Eosinophil% 0.4 0.0 - 8.0 %    Basophil% 0.2 0.0 - 1.9 %    Differential Method Automated    Basic metabolic panel    Collection Time: 04/05/18  3:29 AM   Result Value Ref Range    Sodium 132 (L) 136 - 145 mmol/L    Potassium 5.7 (H) 3.5 - 5.1 mmol/L    Chloride 106 95 - 110 mmol/L    CO2 17 (L) 23 - 29 mmol/L    Glucose 79 70 - 110 mg/dL    BUN, Bld 51 (H) 8 - 23 mg/dL    Creatinine 2.5 (H) 0.5 - 1.4 mg/dL    Calcium 8.5 (L) 8.7 - 10.5 mg/dL    Anion Gap 9 8 - 16 mmol/L    eGFR if African American 21 (A) >60 mL/min/1.73 m^2    eGFR if non African American 19 (A) >60 mL/min/1.73 m^2   CK    Collection Time: 04/05/18  3:29 AM   Result Value Ref Range     20 - 180 U/L   Brain natriuretic peptide    Collection Time: 04/05/18  8:58 AM   Result Value Ref Range    BNP 3,159 (H) 0 - 99 pg/mL   Potassium, urine, random    Collection Time: 04/05/18  9:48 AM   Result Value Ref Range    Potassium Urine Random 39 15 - 95 mmol/L   Sodium, urine, random    Collection Time: 04/05/18  9:48 AM   Result Value Ref Range    Sodium Urine Random 64 20 - 250 mmol/L   Creatinine, urine, random    Collection Time: 04/05/18  9:48 AM   Result Value Ref Range    Creatinine, Random Ur 87.8 15.0 - 325.0 mg/dL   Chloride, urine, random    Collection Time: 04/05/18  9:48 AM   Result Value Ref Range    Chloride, Rand Ur 63 25 - 200 mmol/L   Urea nitrogen, urine    Collection Time: 04/05/18  9:48 AM   Result Value Ref Range    Urine Urea Nitrogen, Random 603 140 - 1050 mg/dL   Osmolality, urine    Collection Time: 04/05/18  9:48 AM   Result Value Ref Range    Osmolality, Ur 448 50 - 1200 mOsm/kg     Current Imaging Results:    Imaging Results           X-Ray Chest 1 View (Final result)  Result time 04/01/18 02:43:29    Final result by Miguel Angel Mendes MD (04/01/18 02:43:29)                 Impression:      Stable cardiomegaly.  Stable trace residual right pleural fluid and/or pleural thickening extending into the base of the major fissure.    No significant change from prior study.      Electronically signed by: Miguel Angel Mendes MD  Date:    04/01/2018  Time:    02:43             Narrative:    EXAMINATION:  XR CHEST 1 VIEW    CLINICAL HISTORY:  Other forms of dyspnea    TECHNIQUE:  Single frontal view of the chest was performed.    COMPARISON:  January 15, 2018    FINDINGS:  Stable cardiomegaly.  Stable trace residual right pleural fluid and/or pleural thickening extending into the base of the major fissure.    Heart and lungs  appear unchanged when allowing for differences in technique and positioning.                                Assessment and Plan:     Problem List:    Active Diagnoses:    Diagnosis Date Noted POA    PRINCIPAL PROBLEM:  Acute kidney injury superimposed on chronic kidney disease stage IV [N17.9, N18.9] 01/21/2018 Yes    Hyperkalemia [E87.5] 04/04/2018 Yes    Renovascular hypertension [I15.0] 04/04/2018 Yes    History of unilateral nephrectomy LEFT [Z90.5] 04/03/2018 Not Applicable    Metabolic acidosis, normal anion gap (NAG) [E87.2] 04/03/2018 Yes    MARYAM (acute kidney injury) [N17.9] 04/03/2018 Yes    Chronic diastolic congestive heart failure [I50.32] 03/21/2018 Yes    Hyperaldosteronism [E26.9] 03/21/2018 Yes    Hypertensive crisis [I16.9] 03/19/2018 Yes    Microcytic anemia [D50.9] 10/16/2017 Yes    Dyslipidemia [E78.5]  Yes    Hypothyroidism [E03.9] 05/13/2015 Yes    Other forms of systemic lupus erythematosus [M32.8] 08/17/2012 Yes     Chronic      Problems Resolved During this Admission:    Diagnosis Date Noted Date Resolved POA     Assessment and Plan:     1. Chronic Kidney Disease, Stage 4              4/2/2018:  BUN/crea 47/2.6. CrCl 20.              Appears to be major issue.     2. Heart Failure, Diastolic, Acute on Chronic              4/2/2018: Normal left ventricular size with low normal to mildly depressed systolic function. EF 50-55%. Severe LVH. Moderate diastolic dysfunction. Moderate to severe AS - 3.3 m/s - 0.9 cm2. SPAP 52 mmHg. Trivial pericardial effusion.               Probably low flow moderate to severe AS with normal EF.              Doubt AS much of a cause for her condition but diastolic dysfunction advanced.   BNP >3,000.   On furosemide 40 mg Q24.     3. Hypertension              Severe and accelerated.              1/18/2018: U/S Renal: Right Renal Artery: Poorly visualized ostium. Peak velocity 0.64 m/s.               3/20/2018: U/S Renal: Right Renal Artery: Peak velocity 0.62 m/s.   4/4/2018: U/S Renal: Right Renal Artery: Peak velocity 0.79 m/s.               That the above two studies do not reveal any increased velocity in the right renal artery makes significant renal artery stenosis as a cause for her severe hypertension and rise in BUN/crea unlikely.              I would not favor angiography.              On carvedilol 25 mg Q12, hydralazine 100 mg Q8, nifedipine 60 mg Q24 and furosemide 40 mg Q24..     4. Hypercholesterolemia              On rosuvastatin 10 mg Q24.      VTE Risk Mitigation         Ordered     heparin (porcine) injection 5,000 Units  Every 8 hours     Route:  Subcutaneous        04/01/18 0527     Place NADER hose  Until discontinued      04/01/18 0527     Place sequential compression device  Until discontinued      04/01/18 0527     IP VTE HIGH RISK PATIENT  Once      04/01/18 0527          Risa Lea MD  Cardiology  [facility]

## 2018-04-06 NOTE — PLAN OF CARE
Plan of care reviewed with patient, medications explained. Pt currently resting comfortably in bed and appears to be sleeping in between care. Pt currently in ST on telemetry monitor. Pt is able to ambulate to toilet to void, although remain oliguric thus far this shift. Twenty four hour urine collection in progress. VSS, bed in lowest, locked position and call light in reach. Hourly rounding performed, will continue to monitor.

## 2018-04-06 NOTE — ASSESSMENT & PLAN NOTE
- Patient discharged on 3/28/2018 with a creatinine of 1.6 mg/dL and presented now with 2.9 mg/dL and with hyperkalemia.    - Spironolactone held.   - US 4/2 shows right medical renal disease.   - Renal Artery US 3/20/18 showed: No significant increased velocity within the visualize the main renal artery.  There is aortic atherosclerotic calcification present.   - Renal vessel US 4/4 not much evidence FRAN noted on US 1/2018, not noted on repeat imaging 3/2018.  - Nephrology followed and patient will follow up with Dr. Johnson.   - Creatinine 3.1 at discharge.     Nephrology noted:  Labile nonoliguric MARYAM on CKD IV (Baseline variable 1.8) with hypertensive heart and acute on chronic diastolic HTN/ Acc HTN (N17.9, N18.4, I13.0, I50.33):  now with likely degree of ATN from BP fluctuations.  Followed by Dr. Epperson at [facility] [place] in past but she has switched to Dr. Brizuela.  Prior renal arterial doppler with some evidence of FRAN but repeat US (x2) reviewed by Dr. Lea does not show significant stenosis last month.  -No ACE/ARB/[place]  -Renally dose meds, avoid nephrotoxins, and monitor I/O's closely.  - Started Lasix 40mg/d.  This will help with peripheral edema, BP control, and kaliuresis.  -Creat noted.  Discussed with attending.  Ok to MARLENE and will have close f/u next week in clinic.

## 2018-04-06 NOTE — DISCHARGE SUMMARY
[facility] Hospital Medicine  Discharge Summary      Patient Name: Ewelina Arnold  MRN: [id number]  Admission Date: 4/1/2018  Hospital Length of Stay: 5 days  Discharge Date and Time:  04/06/2018 1:24 PM  Attending Physician: Dominick Maradiaga MD   Discharging Provider: Dominick Maradiaga MD  Primary Care Provider: Kalie Walton MD      HPI:   This is a 72 y.o. Female, with a history of CKD, CHF, and Lupus, who presents with complaint of shortness of breath that began two days ago. Patient was admitted from 3/19 to 3/28 for MARYAM and emergent hypertension. She reports leg swelling that has worsened since discharge. She reports associated abdominal distension and decreased urination. She reports shortness of breath worsens with exertion. She denies fever, chills, cough, chest pain, dysuria, urinary urgency, or urinary frequency. Patient states she only has one kidney because she donated the other.     * No surgery found *      Hospital Course:   Patient is a 72 year-old woman with hypertension, hyperaldosteronism, and chronic kidney disease stage IV who was recently discharged for the hospital following multiple days of adjustments with her antihypertensive regimen with goal of achieving reasonable blood pressure control along with stable kidney function.  Patient's serum creatinine had been stable (1.6 mg/dL on discharge) but she returned to the emergency department with shortness of breath and also visual hallucinations which she attributes to clonidine.  Laboratory studies reveal acute kidney injury (serum creatinine 2.9 mg/dL) along with hyperkalemia (5.4 mmol/L.  Improving with fluid challenge.  Nephrology consulted.    Eventually resumed Procardia 60 mg and BP improved.  Hyperkalemia improved with sodium bicarbonate which was also improving metabolic acidosis.  Will follow up with nephrology.     See below for details.          Consults:   Consults         Status Ordering Provider      Inpatient consult to Cardiology  Once     Provider:  Risa Lea MD    Completed NICOLLE FALLON JR     Inpatient consult to Nephrology-[place]  Once     Provider:  Nicolle Fallon Jr., MD    Completed NICOLLE FALLON JR          * Acute kidney injury superimposed on chronic kidney disease stage IV    - Patient discharged on 3/28/2018 with a creatinine of 1.6 mg/dL and presented now with 2.9 mg/dL and with hyperkalemia.    - Spironolactone held.   - US 4/2 shows right medical renal disease.   - Renal Artery US 3/20/18 showed: No significant increased velocity within the visualize the main renal artery.  There is aortic atherosclerotic calcification present.   - Renal vessel US 4/4 not much evidence FRAN noted on US 1/2018, not noted on repeat imaging 3/2018.  - Nephrology followed and patient will follow up with Dr. Johnson.   - Creatinine 3.1 at discharge.     Nephrology noted:  Labile nonoliguric MARYAM on CKD IV (Baseline variable 1.8) with hypertensive heart and acute on chronic diastolic HTN/ Acc HTN (N17.9, N18.4, I13.0, I50.33):  now with likely degree of ATN from BP fluctuations.  Followed by Dr. Epperson at [facility] in past but she has switched to Dr. Brizuela.  Prior renal arterial doppler with some evidence of FRAN but repeat US (x2) reviewed by Dr. Lea does not show significant stenosis last month.  -No ACE/ARB/[place]  -Renally dose meds, avoid nephrotoxins, and monitor I/O's closely.  - Started Lasix 40mg/d.  This will help with peripheral edema, BP control, and kaliuresis.  -Creat noted.  Discussed with attending.  Ok to DC and will have close f/u next week in clinic.           Renovascular hypertension    - US 4/4/18 noted: Minimally elevated resistive index in the upper pole segmental right renal artery.  This can be seen in chronic medical renal disease however, the middle and lower segmental resistive indices are normal.  This exam is otherwise normal.  - Procardia 60 added yesterday  with improved BP control noted.  - Procardia 60, hydralazine 100 q8, Coreg 25  - Follow up with Dr. Velez nephrology.              Hyperkalemia    - Discussed with nephrology and likely due to metabolic acidosis.  - Got D50 and insulin overnight by eICU.  - Bicarbonate dose increased.   - Potassium 5.1 at discharge.           Metabolic acidosis, normal anion gap (NAG)    - Sodium bicarbonate BID per nephrology.   - Slowly improving.  - Follow up with nephrology outpatient.          History of unilateral nephrectomy LEFT    - Noted on imaging.             Hyperaldosteronism    - Unable to tolerate spirolactone.    - Hold off on spirolactone for now.  - Sodium is actually low and potassium high with spironolactone held.  - Follow up with nephrology outpatient.           Chronic diastolic congestive heart failure    - Despite elevated BNP, does not appear acute decompensated.    - Continue with medical therapy as tolerated.  - Cardiology followed.              Microcytic anemia    - Chronic microcytic anemia.   - Possibly due to occult blood loss and also due to chronic kidney disease.   - States had a history of colon CA and last colonoscopy was about a year ago and was okay.  - Defer to PCP.           Dyslipidemia    - Continue with statin therapy.          Hypothyroidism    - TSH at goal.    - Continue levothyroxine.          Other forms of systemic lupus erythematosus    - Hydroxychlorquine             Final Active Diagnoses:    Diagnosis Date Noted POA    PRINCIPAL PROBLEM:  Acute kidney injury superimposed on chronic kidney disease stage IV [N17.9, N18.9] 01/21/2018 Yes    Hyperkalemia [E87.5] 04/04/2018 Yes    Renovascular hypertension [I15.0] 04/04/2018 Yes    History of unilateral nephrectomy LEFT [Z90.5] 04/03/2018 Not Applicable    Metabolic acidosis, normal anion gap (NAG) [E87.2] 04/03/2018 Yes    Chronic diastolic congestive heart failure [I50.32] 03/21/2018 Yes    Hyperaldosteronism [E26.9]  03/21/2018 Yes    Microcytic anemia [D50.9] 10/16/2017 Yes    Dyslipidemia [E78.5]  Yes    Hypothyroidism [E03.9] 05/13/2015 Yes    Other forms of systemic lupus erythematosus [M32.8] 08/17/2012 Yes     Chronic      Problems Resolved During this Admission:    Diagnosis Date Noted Date Resolved POA    Hypertensive crisis [I16.9] 03/19/2018 04/06/2018 Yes       Discharged Condition: good    Disposition: Home or Self Care    Follow Up:  Follow-up Information     Kalie Walton MD. Schedule an appointment as soon as possible for a visit in 5 days.    Specialty:  Family Medicine  Contact information:  [address]  [address]  [place] [facility] [address]  [phone]             Gustabo Brizuela MD. Schedule an appointment as soon as possible for a visit in 1 week.    Specialty:  Nephrology  Contact information:  [address]  [address]  [facility]  [facility] [address]  [phone]                 Patient Instructions:     BASIC METABOLIC PANEL   Standing Status: Future  Standing Exp. Date: 06/05/19     Ambulatory referral to Nephrology   Referral Priority: Routine Referral Type: Consultation   Referral Reason: Specialty Services Required    Referred to Provider: GUSTABO BRIZUELA Requested Specialty: Nephrology   Number of Visits Requested: 1      Diet renal     Activity as tolerated     Notify your health care provider if you experience any of the following:  increased confusion or weakness     Notify your health care provider if you experience any of the following:  persistent dizziness, light-headedness, or visual disturbances     Notify your health care provider if you experience any of the following:  worsening rash     Notify your health care provider if you experience any of the following:  severe persistent headache     Notify your health care provider if you experience any of the following:  difficulty breathing or increased cough     Notify your health care provider if  you experience any of the following:  severe uncontrolled pain     Notify your health care provider if you experience any of the following:  persistent nausea and vomiting or diarrhea     Notify your health care provider if you experience any of the following:  temperature >100.4         Significant Diagnostic Studies:   Imaging Results          X-Ray Chest 1 View (Final result)  Result time 04/01/18 02:43:29    Final result by Miguel Angel Mendes MD (04/01/18 02:43:29)                 Impression:      Stable cardiomegaly.  Stable trace residual right pleural fluid and/or pleural thickening extending into the base of the major fissure.    No significant change from prior study.      Electronically signed by: Miguel Angel Mendes MD  Date:    04/01/2018  Time:    02:43             Narrative:    EXAMINATION:  XR CHEST 1 VIEW    CLINICAL HISTORY:  Other forms of dyspnea    TECHNIQUE:  Single frontal view of the chest was performed.    COMPARISON:  January 15, 2018    FINDINGS:  Stable cardiomegaly.  Stable trace residual right pleural fluid and/or pleural thickening extending into the base of the major fissure.    Heart and lungs  appear unchanged when allowing for differences in technique and positioning.                                Pending Diagnostic Studies:     Procedure Component Value Units Date/Time    Aldosterone, urine, 24 hour [[id number]] Collected:  04/06/18 1229    Order Status:  Sent Lab Status:  In process Updated:  04/06/18 1230    Specimen:  Urine from Urine, Clean Catch     Aldosterone/Renin Activity Ratio [[id number]] Collected:  04/05/18 0839    Order Status:  Sent Lab Status:  In process Updated:  04/05/18 1131    Specimen:  Blood from Blood          Medications:  Reconciled Home Medications:      Medication List      START taking these medications    furosemide 40 MG tablet  Commonly known as:  LASIX  Take 1 tablet (40 mg total) by mouth once daily.  Start taking on:  4/7/2018     hydrALAZINE 100 MG  tablet  Commonly known as:  APRESOLINE  Take 1 tablet (100 mg total) by mouth every 12 (twelve) hours.        CHANGE how you take these medications    NIFEdipine 60 MG (OSM) 24 hr tablet  Commonly known as:  PROCARDIA-XL  Take 1 tablet (60 mg total) by mouth once daily.  Start taking on:  4/7/2018  What changed:  · medication strength  · how much to take     sodium bicarbonate 650 MG tablet  Take 2 tablets (1,300 mg total) by mouth 2 (two) times daily.  What changed:  how much to take        CONTINUE taking these medications    acetaminophen 325 MG tablet  Commonly known as:  TYLENOL  Take 2 tablets (650 mg total) by mouth every 6 (six) hours.     aspirin 81 MG EC tablet  Commonly known as:  ECOTRIN  Take 1 tablet (81 mg total) by mouth once daily.     carvedilol 25 MG tablet  Commonly known as:  COREG  Take 1 tablet (25 mg total) by mouth 2 (two) times daily.     citalopram 20 MG tablet  Commonly known as:  CELEXA  Take 1.5 tablets (30 mg total) by mouth once daily.     famotidine 20 MG tablet  Commonly known as:  PEPCID  Take 1 tablet (20 mg total) by mouth once daily.     hydroxychloroquine 200 mg tablet  Commonly known as:  PLAQUENIL  TAKE 1 TABLET TWICE DAILY     latanoprost 0.005 % ophthalmic solution  INSTILL 1 DROP INTO BOTH EYES EVERY DAY     levothyroxine 75 MCG tablet  Commonly known as:  SYNTHROID  Take 1 tablet (75 mcg total) by mouth before breakfast.     rosuvastatin 10 MG tablet  Commonly known as:  CRESTOR  Take 1 tablet (10 mg total) by mouth every evening.        STOP taking these medications    cloNIDine 0.1 MG tablet  Commonly known as:  CATAPRES     spironolactone 50 MG tablet  Commonly known as:  ALDACTONE           Where to Get Your Medications      These medications were sent to [facility] - [place], [place] - [address]  [address], [place] [facility] [place] [facility] [address]    Phone:  [phone]   · furosemide 40 MG tablet  · hydrALAZINE 100 MG  tablet  · NIFEdipine 60 MG (OSM) 24 hr tablet     You can get these medications from any pharmacy    You don't need a prescription for these medications  · sodium bicarbonate 650 MG tablet         Indwelling Lines/Drains at time of discharge:   Lines/Drains/Airways          No matching active lines, drains, or airways          Time spent on the discharge of patient: 25 minutes  Patient was seen and examined on the date of discharge and determined to be suitable for discharge.         Dominick Maradiaga MD  Department of Hospital Medicine  [facility]

## 2018-04-06 NOTE — ASSESSMENT & PLAN NOTE
- Despite elevated BNP, does not appear acute decompensated.    - Continue with medical therapy as tolerated.  - Cardiology followed.

## 2018-04-06 NOTE — PROGRESS NOTES
[facility] Hospital Medicine  Progress Note    Patient Name: Ewelina Arnold  MRN: [id number]  Patient Class: IP- Inpatient   Admission Date: 4/1/2018  Length of Stay: 5 days  Attending Physician: Dominick Maradiaga MD  Primary Care Provider: Kalie Walton MD        Subjective:     Principal Problem:Acute kidney injury superimposed on chronic kidney disease    HPI:  This is a 72 y.o. Female, with a history of CKD, CHF, and Lupus, who presents with complaint of shortness of breath that began two days ago. Patient was admitted from 3/19 to 3/28 for MARYAM and emergent hypertension. She reports leg swelling that has worsened since discharge. She reports associated abdominal distension and decreased urination. She reports shortness of breath worsens with exertion. She denies fever, chills, cough, chest pain, dysuria, urinary urgency, or urinary frequency. Patient states she only has one kidney because she donated the other.     Hospital Course:  Patient is a 72 year-old woman with hypertension, hyperaldosteronism, and chronic kidney disease stage IV who was recently discharged for the hospital following multiple days of adjustments with her antihypertensive regimen with goal of achieving reasonable blood pressure control along with stable kidney function.  Patient's serum creatinine had been stable (1.6 mg/dL on discharge) but she returned to the emergency department with shortness of breath and also visual hallucinations which she attributes to clonidine.  Laboratory studies reveal acute kidney injury (serum creatinine 2.9 mg/dL) along with hyperkalemia (5.4 mmol/L.  Improving with fluid challenge.  Nephrology consulted.    Interval History:   Doing well and ready to go home.  Discussed with Joey Barnard.        Review of Systems   Constitutional: Negative for diaphoresis and fever.   Eyes: Negative for discharge and visual disturbance.   Respiratory: Negative for cough and shortness of breath.     Cardiovascular: Negative for chest pain and palpitations.   Gastrointestinal: Negative for abdominal pain and nausea.   Genitourinary: Negative for difficulty urinating and dysuria.   Musculoskeletal: Negative for arthralgias and myalgias.   Skin: Negative for color change and rash.   Neurological: Negative for dizziness, numbness and headaches.   Psychiatric/Behavioral: Negative for agitation and confusion.     Objective:     Vital Signs (Most Recent):  Temp: 98.2 °F (36.8 °C) (04/06/18 0706)  Pulse: 70 (04/06/18 1000)  Resp: 18 (04/06/18 0706)  BP: (!) 158/74 (04/06/18 0706)  SpO2: 95 % (04/06/18 0706) Vital Signs (24h Range):  Temp:  [98.2 °F (36.8 °C)-98.7 °F (37.1 °C)] 98.2 °F (36.8 °C)  Pulse:  [58-81] 70  Resp:  [14-18] 18  SpO2:  [94 %-98 %] 95 %  BP: (122-158)/(60-74) 158/74     Weight: 53.9 kg (118 lb 13.3 oz)  Body mass index is 19.77 kg/m².    Intake/Output Summary (Last 24 hours) at 04/06/18 1301  Last data filed at 04/06/18 0500   Gross per 24 hour   Intake                0 ml   Output              300 ml   Net             -300 ml      Physical Exam   Constitutional: She is oriented to person, place, and time. She appears well-developed and well-nourished.   HENT:   Head: Normocephalic and atraumatic.   Eyes: Conjunctivae are normal. Pupils are equal, round, and reactive to light.   Neck: Normal range of motion. Neck supple.   Cardiovascular: Regular rhythm and normal heart sounds.    Pulmonary/Chest: Effort normal and breath sounds normal.   Abdominal: Soft. Bowel sounds are normal.   Musculoskeletal: She exhibits no edema or tenderness.   Neurological: She is alert and oriented to person, place, and time.   Skin: Skin is warm and dry.       Significant Labs:   BMP:   Recent Labs  Lab 04/06/18  0443   GLU 72   *   K 5.1      CO2 18*   BUN 56*   CREATININE 3.1*   CALCIUM 8.6*   MG 1.7     CBC:   Recent Labs  Lab 04/05/18  0329 04/06/18  0443   WBC 4.87 4.19   HGB 8.3* 7.4*   HCT 25.3*  22.8*    230     Magnesium:   Recent Labs  Lab 04/05/18  0329 04/06/18  0443   MG 1.7 1.7       Significant Imaging: I have reviewed all pertinent imaging results/findings within the past 24 hours.   Imaging Results          X-Ray Chest 1 View (Final result)  Result time 04/01/18 02:43:29    Final result by Miguel Angel Mendes MD (04/01/18 02:43:29)                 Impression:      Stable cardiomegaly.  Stable trace residual right pleural fluid and/or pleural thickening extending into the base of the major fissure.    No significant change from prior study.      Electronically signed by: Miguel Angel Mendes MD  Date:    04/01/2018  Time:    02:43             Narrative:    EXAMINATION:  XR CHEST 1 VIEW    CLINICAL HISTORY:  Other forms of dyspnea    TECHNIQUE:  Single frontal view of the chest was performed.    COMPARISON:  January 15, 2018    FINDINGS:  Stable cardiomegaly.  Stable trace residual right pleural fluid and/or pleural thickening extending into the base of the major fissure.    Heart and lungs  appear unchanged when allowing for differences in technique and positioning.                              Assessment/Plan:      * Acute kidney injury superimposed on chronic kidney disease stage IV    - Patient discharged on 3/28/2018 with a creatinine of 1.6 mg/dL and presented now with 2.9 mg/dL and with hyperkalemia.    - Spironolactone held.   - US 4/2 shows right medical renal disease.   - Renal Artery US 3/20/18 showed: No significant increased velocity within the visualize the main renal artery.  There is aortic atherosclerotic calcification present.   - Renal vessel US 4/4 not much evidence FRAN noted on US 1/2018, not noted on repeat imaging 3/2018.  - Nephrology followed and patient will follow up with Dr. Johnson.   - Creatinine 3.1 at discharge.           Renovascular hypertension    - US 4/4/18 noted: Minimally elevated resistive index in the upper pole segmental right renal artery.  This can be  seen in chronic medical renal disease however, the middle and lower segmental resistive indices are normal.  This exam is otherwise normal.  - Procardia 60 added yesterday with improved BP control noted.  - Procardia 60, hydralazine 100 q8, Coreg 25  - Follow up with Dr. Velez nephrology.              Hyperkalemia    - Discussed with nephrology and likely due to metabolic acidosis.  - Got D50 and insulin overnight by eICU.  - Bicarbonate dose increased.   - Potassium 5.1 at discharge.           Metabolic acidosis, normal anion gap (NAG)    - Sodium bicarbonate BID per nephrology.   - Slowly improving.  - Follow up with nephrology outpatient.          History of unilateral nephrectomy LEFT    - Noted on imaging.             MARYAM (acute kidney injury)              Hyperaldosteronism    - Unable to tolerate spirolactone.    - Hold off on spirolactone for now.  - Sodium is actually low and potassium high with spironolactone held.  - Follow up with nephrology outpatient.           Chronic diastolic congestive heart failure    - Despite elevated BNP, does not appear acute decompensated.    - Continue with medical therapy as tolerated.  - Cardiology followed.              Hypertensive crisis    - Resolved.          Microcytic anemia    - Chronic microcytic anemia.   - Possibly due to occult blood loss and also due to chronic kidney disease.   - States had a history of colon CA and last colonoscopy was about a year ago and was okay.  - Defer to PCP.           Dyslipidemia    - Continue with statin therapy.          Hypothyroidism    - TSH at goal.    - Continue levothyroxine.          Other forms of systemic lupus erythematosus    - Hydroxychlorquine             VTE Risk Mitigation         Ordered     heparin (porcine) injection 5,000 Units  Every 8 hours     Route:  Subcutaneous        04/01/18 0527     Place NADER hose  Until discontinued      04/01/18 0527     Place sequential compression device  Until discontinued       04/01/18 0527     IP VTE HIGH RISK PATIENT  Once      04/01/18 0527              Dominick Maradiaga MD  Department of Hospital Medicine   [facility]

## 2018-04-06 NOTE — ASSESSMENT & PLAN NOTE
- Sodium bicarbonate BID per nephrology.   - Slowly improving.  - Follow up with nephrology outpatient.

## 2018-04-06 NOTE — PLAN OF CARE
04/06/18 1148   Medicare Message   Important Message from Medicare regarding Discharge Appeal Rights Given to patient/caregiver;Explained to patient/caregiver;Signed/date by patient/caregiver   Date IMM was signed 04/06/18   Time IMM was signed 0700

## 2018-04-06 NOTE — PROGRESS NOTES
Progress Note  [facility]    Admit Date: 4/1/2018   LOS: 5 days     SUBJECTIVE:     Follow-up For:      Interval History:     Sitting in chair in NAD.  Uneventful night.  BP much improved.  Discussed with team.  Ready to home.  Explained mild MARYAM from BP shifts.  No CP/SOB.      Review of Systems:  Constitutional: No fever or chills  Respiratory:  shortness of breath better  Cardiovascular: tightness resolved  Gastrointestinal: No nausea or vomiting  Neurological: No confusion or weakness    OBJECTIVE:     Vital Signs Range (Last 24H):  Vitals:    04/06/18 0800   BP:    Pulse: 81   Resp:    Temp:        Temp:  [97.9 °F (36.6 °C)-98.7 °F (37.1 °C)]   Pulse:  [58-81]   Resp:  [14-22]   BP: (122-163)/(60-74)   SpO2:  [94 %-99 %]     I & O (Last 24H):    Intake/Output Summary (Last 24 hours) at 04/06/18 0929  Last data filed at 04/06/18 0500   Gross per 24 hour   Intake                0 ml   Output              400 ml   Net             -400 ml       Physical Exam:  General appearance: NAD.   Eyes:  Conjunctivae/corneas clear. PERRL.  Lungs: Normal respiratory effort,   clear to auscultation bilaterally   Heart: Regular rate and rhythm, S1, S2 normal, no murmur, rub or ruma.  Abdomen: Soft, non-tender non-distended; bowel sounds normal; no masses,  no organomegaly  Extremities: No cyanosis or clubbing. no edema.  DP pulses 2+/4+ bilaterally.  Skin: Skin color, texture, turgor normal. No rashes or lesions  Neurologic: Normal strength and tone. No focal numbness or weakness       Laboratory Data:      Recent Labs  Lab 04/06/18  0443   *   K 5.1      CO2 18*   BUN 56*   CREATININE 3.1*   CALCIUM 8.6*   PHOS 4.6*     Lab Results   Component Value Date    .0 (H) 11/21/2016    CALCIUM 8.6 (L) 04/06/2018    PHOS 4.6 (H) 04/06/2018     Lab Results   Component Value Date    IRON 32 04/02/2018    TIBC 250 04/02/2018    FERRITIN 261 04/04/2018       Medications:  Medication list was reviewed and changes  noted under Assessment/Plan.    Diagnostic Results:    Reviewed most recent CT/US/Echo/MRI    CONCLUSIONS     1 - Concentric hypertrophy.     2 - Low normal to mildly depressed left ventricular systolic function (EF 50-55%).     3 - No wall motion abnormalities.     4 - Impaired LV relaxation, elevated LAP (grade 2 diastolic dysfunction).     5 - Biatrial enlargement.     6 - Moderate to severe aortic stenosis, PABLITO = 0.94 cm2, AVAi = 0.59 cm2/m2, peak velocity = 3.27 m/s, mean gradient = 16 mmHg.     7 - Mild aortic regurgitation.     8 - Mild to moderate mitral regurgitation.     9 - Pulmonary hypertension. The estimated PA systolic pressure is 52 mmHg.     10 - Trivial pericardial effusion.       ASSESSMENT/PLAN:     Resolved Hallucinations  -patient thinks it may have been due to clonidine.  Made an allergy.      Labile nonoliguric MARYAM on CKD IV (Baseline variable 1.8) with hypertensive heart and acute on chronic diastolic HTN/ Acc HTN (N17.9, N18.4, I13.0, I50.33):  now with likely degree of ATN from BP fluctuations.  Followed by Dr. Epperson at [facility] in past but she has switched to Dr. Brizuela.  Prior renal arterial doppler with some evidence of FRAN but repeat US (x2) reviewed by Dr. Lea does not show significant stenosis last month.  -No ACE/ARB/Gooding  -Renally dose meds, avoid nephrotoxins, and monitor I/O's closely.  - Started Lasix 40mg/d.  This will help with peripheral edema, BP control, and kaliuresis.  -Creat noted.  Discussed with attending.  Ok to DC and will have close f/u next week in clinic.      Metabolic acidosis/Hyperkalemia (E87.2, E87.5)  -increased po bicarb tabs.  Gave chlorthalidone 4/3.    -low K diet.  -insulin/D50 given by EICU.  -suspect degree of RTA.     -not consistent with hyperaldo????  Will check for hyperaldo again as outpatient--she will need to be off all diuretics and RAAS blockers for a month to allow time for wash-out.  -improved.        Edema  -secondary to CCB.    -advised about common findings with this drug class  -lasix    AS (I35.0):  -defer       Acc HTN:  -transfered to ICU for Cardene drip 4/3.  -weaned off.  -Coreg, Hydralazine, and restarted procardia.  Risk and SE profile reviewed.  -Much improved and would like to keep -160's.  -only other option would be Minoxidil.          H/o Bradycardia (R00.1):  Pt was on high dose labetolol, and developed evelyn (HR 30-40s).  Held agents and HR improved  to 80's- 100's.         1o Hyperaldosteronism? (E26.9):  Ironic that she now has hyperkalemia.         SLE (M32.9):  Continue plaquenil.  Defer. Anti-histone negative.       Anemia: may be due to sle vs other. Defer to hospitalist for w/u. Iron is low. Stool positive for blood.  Needs outpt w/up.  Give venofer prior to DC today.        Chest tightness (R07.89):  -resolved.  -EKG/Trop negative.  -Dr. Lea following.         See above  Very complex lady.    Ok to DC home.  Meds as on current MAR.  F/u with Dr. Brizuela next week  Labs ordered in EPIC to be done Monday/Tuesday next week.

## 2018-04-06 NOTE — ASSESSMENT & PLAN NOTE
- Patient discharged on 3/28/2018 with a creatinine of 1.6 mg/dL and presented now with 2.9 mg/dL and with hyperkalemia.    - Spironolactone held.   - US 4/2 shows right medical renal disease.   - Renal Artery US 3/20/18 showed: No significant increased velocity within the visualize the main renal artery.  There is aortic atherosclerotic calcification present.   - Renal vessel US 4/4 not much evidence FRAN noted on US 1/2018, not noted on repeat imaging 3/2018.  - Nephrology followed and patient will follow up with Dr. Johnson.   - Creatinine 3.1 at discharge.

## 2018-04-06 NOTE — PLAN OF CARE
Pt medically cleared for DC.      Family/friend will provide ride home.      No DC needs from CM standpoint.        04/06/18 1324   Final Note   Assessment Type Final Discharge Note   Discharge Disposition Home   What phone number can be called within the next 1-3 days to see how you are doing after discharge? [phone]   Discharge plans and expectations educations in teach back method with documentation complete? Yes   Right Care Referral Info   Post Acute Recommendation No Care

## 2018-04-09 ENCOUNTER — PATIENT OUTREACH (OUTPATIENT)
Dept: OTHER | Facility: OTHER | Age: 73
End: 2018-04-09

## 2018-04-09 NOTE — PROGRESS NOTES
Last 5 Patient Entered Readings                                      Current 30 Day Average: 164/74     Recent Readings 4/9/2018 4/9/2018 4/8/2018 4/7/2018 4/7/2018    SBP (mmHg) 149 129 177 179 212    DBP (mmHg) 63 72 81 73 93    Pulse 73 64 76 87 79        Patient was in [facility] 3/19-3/28 and again 4/1-4/6 for MARYAM and HTN. She will be followed by new physicians there and no longer wants to participate in the program. I will discharge her from our services at this time per patient request.

## 2018-04-09 NOTE — PROGRESS NOTES
Assessment /Plan     For exam results, see Encounter Report.    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 Medication interaction --> resolved off clonidine    Short duration  Not typical for migraine activity        Residual Angle Closure  Stable Glaucoma & Patient near target IOP range.  Will continue with same medicines as patient is tolerating well with good adherence    s/p PI OU    Not a VF taker  Fell asleep during testing 2/28/2018  VF not cw OCT or ONH exam      CCT  535 // 537    Target mid teens    Both eyes  Xal q HS    SLE on plaquenil > 5years  [facility] chart routine with Q & A  Doing well      NSC OU  Observe    Visual Hallucinations  Potted plants / trees at night            Plan  RTC retina service --> Plaquenil check  RTC 6 months for IOP,  OCT Mac & RNFL --> machine down 2/28/2018 and need to reschedule  RTC sooner prn with good understanding

## 2018-04-10 ENCOUNTER — OUTPATIENT CASE MANAGEMENT (OUTPATIENT)
Dept: ADMINISTRATIVE | Facility: OTHER | Age: 73
End: 2018-04-10

## 2018-04-10 NOTE — PROGRESS NOTES
The following patient has been assigned to Nimo Mercado, RN with Outpatient Complex Care Management for high risk screening.    Reason: High Risk : Recently discharged      Please contact OPCM at [phone] with any questions.    Thank you,    Sapna Johnson, [facility]  Outpatient Complex Care Mgmt  Ext [phone]

## 2018-04-11 LAB
ALDOST 24H UR-MRATE: 1.4 UG/D (ref 1.2–28.1)
COLLECT DURATION TIME SPEC: 24 HR
CREAT 24H UR-MRATE: 216 MG/D (ref 500–1400)
CREAT UR-MCNC: 72 MG/DL
SPECIMEN VOL ?TM UR: 300 ML

## 2018-04-11 NOTE — PHYSICIAN QUERY
PT Name: Ewelina Arnold  MR #: [id number]     Physician Query Form - Diagnosis Clarification      CDS/: Daksha Ragsdale RN              Contact information: [phone]    This form is a permanent document in the medical record.     Query Date: April 11, 2018    By submitting this query, we are merely seeking further clarification of documentation.  Please utilize your independent clinical judgment when addressing the question(s) below.     The medical record contains the following:      Findings Supporting Clinical Information Location in Medical Record   ATN Labile nonoliguric MARYAM on CKD IV (Baseline variable 1.8) with hypertensive heart and acute on chronic diastolic HTN/ Acc HTN (N17.9, N18.4, I13.0, I50.33):    now with likely degree of ATN from BP fluctuations.    Mild MARYAM from BP shifts    -Renally dose meds, avoid nephrotoxins, and monitor I/O's closely.   -NO ACE/ARB/Syd  Acc HTN:  Transferred to ICU for Cardene drip 4/3      BP= 226/105, 119/60,  249/115, 119/60,  227/102, 242/104     4/6 Renal note                4/1 Flowsheet     Please clarify if the Acute tubular necrosis diagnosis has been:    [ X ] Ruled In  [  ] Ruled In, Now Resolved  [  ] Ruled Out  [  ] Clinically insignificant  [  ] Clinically undetermined  [  ] Other/Clarification of findings (please specify)_______________________________    Please document in your progress notes daily for the duration of treatment, until resolved, and include in your discharge summary.

## 2018-04-12 ENCOUNTER — HOSPITAL ENCOUNTER (INPATIENT)
Facility: OTHER | Age: 73
LOS: 3 days | Discharge: HOME | DRG: 291 | End: 2018-04-15
Attending: EMERGENCY MEDICINE | Admitting: HOSPITALIST
Payer: MEDICARE

## 2018-04-12 DIAGNOSIS — I15.0 RENOVASCULAR HYPERTENSION: ICD-10-CM

## 2018-04-12 DIAGNOSIS — Z90.5 HISTORY OF UNILATERAL NEPHRECTOMY: ICD-10-CM

## 2018-04-12 DIAGNOSIS — I16.9 HYPERTENSIVE CRISIS: ICD-10-CM

## 2018-04-12 DIAGNOSIS — N18.9 CHRONIC KIDNEY DISEASE, UNSPECIFIED CKD STAGE: ICD-10-CM

## 2018-04-12 DIAGNOSIS — D63.1 ANEMIA ASSOCIATED WITH CHRONIC RENAL FAILURE: ICD-10-CM

## 2018-04-12 DIAGNOSIS — N18.9 ANEMIA ASSOCIATED WITH CHRONIC RENAL FAILURE: ICD-10-CM

## 2018-04-12 DIAGNOSIS — N18.4 CKD (CHRONIC KIDNEY DISEASE), STAGE IV: ICD-10-CM

## 2018-04-12 DIAGNOSIS — I50.33 ACUTE ON CHRONIC DIASTOLIC HEART FAILURE: ICD-10-CM

## 2018-04-12 DIAGNOSIS — I1A.0 RESISTANT HYPERTENSION: Primary | ICD-10-CM

## 2018-04-12 DIAGNOSIS — I50.1 PULMONARY EDEMA CARDIAC CAUSE: ICD-10-CM

## 2018-04-12 DIAGNOSIS — I51.89 LEFT VENTRICULAR DIASTOLIC DYSFUNCTION WITH PRESERVED SYSTOLIC FUNCTION: ICD-10-CM

## 2018-04-12 DIAGNOSIS — J81.0 FLASH PULMONARY EDEMA: ICD-10-CM

## 2018-04-12 DIAGNOSIS — R06.02 SHORTNESS OF BREATH: ICD-10-CM

## 2018-04-12 DIAGNOSIS — R79.89 ELEVATED TROPONIN: ICD-10-CM

## 2018-04-12 DIAGNOSIS — M32.8 OTHER FORMS OF SYSTEMIC LUPUS ERYTHEMATOSUS, UNSPECIFIED ORGAN INVOLVEMENT STATUS: Chronic | ICD-10-CM

## 2018-04-12 DIAGNOSIS — E26.9 HYPERALDOSTERONISM: ICD-10-CM

## 2018-04-12 LAB
ALBUMIN SERPL BCP-MCNC: 3 G/DL
ALP SERPL-CCNC: 67 U/L
ALT SERPL W/O P-5'-P-CCNC: 11 U/L
ANION GAP SERPL CALC-SCNC: 7 MMOL/L
AST SERPL-CCNC: 29 U/L
BASOPHILS # BLD AUTO: 0.02 K/UL
BASOPHILS NFR BLD: 0.4 %
BILIRUB SERPL-MCNC: 0.3 MG/DL
BNP SERPL-MCNC: 4002 PG/ML
BUN SERPL-MCNC: 47 MG/DL
CALCIUM SERPL-MCNC: 8.7 MG/DL
CHLORIDE SERPL-SCNC: 106 MMOL/L
CO2 SERPL-SCNC: 23 MMOL/L
CREAT SERPL-MCNC: 3.3 MG/DL
DIFFERENTIAL METHOD: ABNORMAL
EOSINOPHIL # BLD AUTO: 0.1 K/UL
EOSINOPHIL NFR BLD: 2.5 %
ERYTHROCYTE [DISTWIDTH] IN BLOOD BY AUTOMATED COUNT: 14.7 %
EST. GFR  (AFRICAN AMERICAN): 15 ML/MIN/1.73 M^2
EST. GFR  (NON AFRICAN AMERICAN): 13 ML/MIN/1.73 M^2
GLUCOSE SERPL-MCNC: 69 MG/DL
HCT VFR BLD AUTO: 24.1 %
HGB BLD-MCNC: 7.8 G/DL
LYMPHOCYTES # BLD AUTO: 1.4 K/UL
LYMPHOCYTES NFR BLD: 25.8 %
MCH RBC QN AUTO: 26.4 PG
MCHC RBC AUTO-ENTMCNC: 32.4 G/DL
MCV RBC AUTO: 81 FL
MONOCYTES # BLD AUTO: 0.7 K/UL
MONOCYTES NFR BLD: 12.4 %
NEUTROPHILS # BLD AUTO: 3.3 K/UL
NEUTROPHILS NFR BLD: 58.7 %
PLATELET # BLD AUTO: 235 K/UL
PMV BLD AUTO: 9.1 FL
POTASSIUM SERPL-SCNC: 5.1 MMOL/L
PROT SERPL-MCNC: 6.6 G/DL
RBC # BLD AUTO: 2.96 M/UL
SODIUM SERPL-SCNC: 136 MMOL/L
TROPONIN I SERPL DL<=0.01 NG/ML-MCNC: 0.2 NG/ML
TROPONIN I SERPL DL<=0.01 NG/ML-MCNC: 0.21 NG/ML
TROPONIN I SERPL DL<=0.01 NG/ML-MCNC: 0.23 NG/ML
WBC # BLD AUTO: 5.55 K/UL

## 2018-04-12 PROCEDURE — 63600175 PHARM REV CODE 636 W HCPCS: Performed by: HOSPITALIST

## 2018-04-12 PROCEDURE — 36415 COLL VENOUS BLD VENIPUNCTURE: CPT

## 2018-04-12 PROCEDURE — 63600175 PHARM REV CODE 636 W HCPCS: Performed by: EMERGENCY MEDICINE

## 2018-04-12 PROCEDURE — 11000001 HC ACUTE MED/SURG PRIVATE ROOM

## 2018-04-12 PROCEDURE — 99223 1ST HOSP IP/OBS HIGH 75: CPT | Mod: AI,,, | Performed by: HOSPITALIST

## 2018-04-12 PROCEDURE — 25000003 PHARM REV CODE 250: Performed by: EMERGENCY MEDICINE

## 2018-04-12 PROCEDURE — 84484 ASSAY OF TROPONIN QUANT: CPT | Mod: 91

## 2018-04-12 PROCEDURE — 80053 COMPREHEN METABOLIC PANEL: CPT

## 2018-04-12 PROCEDURE — 85025 COMPLETE CBC W/AUTO DIFF WBC: CPT

## 2018-04-12 PROCEDURE — 27000221 HC OXYGEN, UP TO 24 HOURS

## 2018-04-12 PROCEDURE — 93010 ELECTROCARDIOGRAM REPORT: CPT | Mod: ,,, | Performed by: INTERNAL MEDICINE

## 2018-04-12 PROCEDURE — 25000003 PHARM REV CODE 250: Performed by: NURSE PRACTITIONER

## 2018-04-12 PROCEDURE — 84484 ASSAY OF TROPONIN QUANT: CPT

## 2018-04-12 PROCEDURE — 99900035 HC TECH TIME PER 15 MIN (STAT)

## 2018-04-12 PROCEDURE — 83880 ASSAY OF NATRIURETIC PEPTIDE: CPT

## 2018-04-12 PROCEDURE — 94761 N-INVAS EAR/PLS OXIMETRY MLT: CPT

## 2018-04-12 PROCEDURE — 25000003 PHARM REV CODE 250: Performed by: HOSPITALIST

## 2018-04-12 PROCEDURE — 99284 EMERGENCY DEPT VISIT MOD MDM: CPT

## 2018-04-12 RX ORDER — NITROGLYCERIN 0.4 MG/1
0.4 TABLET SUBLINGUAL
Status: COMPLETED | OUTPATIENT
Start: 2018-04-12 | End: 2018-04-12

## 2018-04-12 RX ORDER — HYDROXYCHLOROQUINE SULFATE 200 MG/1
200 TABLET, FILM COATED ORAL 2 TIMES DAILY
Status: DISCONTINUED | OUTPATIENT
Start: 2018-04-12 | End: 2018-04-15 | Stop reason: HOSPADM

## 2018-04-12 RX ORDER — ACETAMINOPHEN 325 MG/1
650 TABLET ORAL EVERY 6 HOURS PRN
Status: DISCONTINUED | OUTPATIENT
Start: 2018-04-12 | End: 2018-04-15 | Stop reason: HOSPADM

## 2018-04-12 RX ORDER — ACETAMINOPHEN 325 MG/1
650 TABLET ORAL EVERY 8 HOURS PRN
Status: CANCELLED | OUTPATIENT
Start: 2018-04-12

## 2018-04-12 RX ORDER — LATANOPROST 50 UG/ML
1 SOLUTION/ DROPS OPHTHALMIC DAILY
Status: DISCONTINUED | OUTPATIENT
Start: 2018-04-12 | End: 2018-04-15 | Stop reason: HOSPADM

## 2018-04-12 RX ORDER — HYDRALAZINE HYDROCHLORIDE 25 MG/1
100 TABLET, FILM COATED ORAL
Status: COMPLETED | OUTPATIENT
Start: 2018-04-12 | End: 2018-04-12

## 2018-04-12 RX ORDER — SODIUM BICARBONATE 650 MG/1
1300 TABLET ORAL 2 TIMES DAILY
Status: DISCONTINUED | OUTPATIENT
Start: 2018-04-12 | End: 2018-04-12

## 2018-04-12 RX ORDER — RAMELTEON 8 MG/1
8 TABLET ORAL NIGHTLY PRN
Status: DISCONTINUED | OUTPATIENT
Start: 2018-04-12 | End: 2018-04-15 | Stop reason: HOSPADM

## 2018-04-12 RX ORDER — FUROSEMIDE 10 MG/ML
40 INJECTION INTRAMUSCULAR; INTRAVENOUS
Status: DISCONTINUED | OUTPATIENT
Start: 2018-04-12 | End: 2018-04-12

## 2018-04-12 RX ORDER — ASPIRIN 325 MG
325 TABLET ORAL
Status: COMPLETED | OUTPATIENT
Start: 2018-04-12 | End: 2018-04-12

## 2018-04-12 RX ORDER — ONDANSETRON 8 MG/1
8 TABLET, ORALLY DISINTEGRATING ORAL EVERY 8 HOURS PRN
Status: DISCONTINUED | OUTPATIENT
Start: 2018-04-12 | End: 2018-04-15 | Stop reason: HOSPADM

## 2018-04-12 RX ORDER — NIFEDIPINE 30 MG/1
60 TABLET, EXTENDED RELEASE ORAL DAILY
Status: DISCONTINUED | OUTPATIENT
Start: 2018-04-12 | End: 2018-04-14

## 2018-04-12 RX ORDER — FUROSEMIDE 10 MG/ML
INJECTION INTRAMUSCULAR; INTRAVENOUS
Status: DISCONTINUED
Start: 2018-04-12 | End: 2018-04-12 | Stop reason: WASHOUT

## 2018-04-12 RX ORDER — HEPARIN SODIUM 5000 [USP'U]/ML
5000 INJECTION, SOLUTION INTRAVENOUS; SUBCUTANEOUS EVERY 8 HOURS
Status: DISCONTINUED | OUTPATIENT
Start: 2018-04-12 | End: 2018-04-15 | Stop reason: HOSPADM

## 2018-04-12 RX ORDER — HYDRALAZINE HYDROCHLORIDE 25 MG/1
100 TABLET, FILM COATED ORAL EVERY 12 HOURS
Status: DISCONTINUED | OUTPATIENT
Start: 2018-04-12 | End: 2018-04-15 | Stop reason: HOSPADM

## 2018-04-12 RX ORDER — CARVEDILOL 12.5 MG/1
25 TABLET ORAL ONCE
Status: COMPLETED | OUTPATIENT
Start: 2018-04-12 | End: 2018-04-12

## 2018-04-12 RX ORDER — ONDANSETRON 2 MG/ML
4 INJECTION INTRAMUSCULAR; INTRAVENOUS EVERY 8 HOURS PRN
Status: DISCONTINUED | OUTPATIENT
Start: 2018-04-12 | End: 2018-04-15 | Stop reason: HOSPADM

## 2018-04-12 RX ORDER — POLYETHYLENE GLYCOL 3350 17 G/17G
17 POWDER, FOR SOLUTION ORAL 2 TIMES DAILY PRN
Status: DISCONTINUED | OUTPATIENT
Start: 2018-04-12 | End: 2018-04-15 | Stop reason: HOSPADM

## 2018-04-12 RX ORDER — CARVEDILOL 12.5 MG/1
25 TABLET ORAL 2 TIMES DAILY
Status: DISCONTINUED | OUTPATIENT
Start: 2018-04-12 | End: 2018-04-15 | Stop reason: HOSPADM

## 2018-04-12 RX ORDER — ASPIRIN 81 MG/1
81 TABLET ORAL DAILY
Status: DISCONTINUED | OUTPATIENT
Start: 2018-04-12 | End: 2018-04-15 | Stop reason: HOSPADM

## 2018-04-12 RX ORDER — LEVOTHYROXINE SODIUM 75 UG/1
75 TABLET ORAL
Status: DISCONTINUED | OUTPATIENT
Start: 2018-04-13 | End: 2018-04-15 | Stop reason: HOSPADM

## 2018-04-12 RX ORDER — FUROSEMIDE 10 MG/ML
20 INJECTION INTRAMUSCULAR; INTRAVENOUS 2 TIMES DAILY
Status: DISCONTINUED | OUTPATIENT
Start: 2018-04-12 | End: 2018-04-12

## 2018-04-12 RX ORDER — SODIUM BICARBONATE 650 MG/1
1 TABLET ORAL 2 TIMES DAILY
Status: DISCONTINUED | OUTPATIENT
Start: 2018-04-12 | End: 2018-04-15 | Stop reason: HOSPADM

## 2018-04-12 RX ORDER — NIFEDIPINE 20 MG/1
20 CAPSULE ORAL ONCE
Status: COMPLETED | OUTPATIENT
Start: 2018-04-12 | End: 2018-04-12

## 2018-04-12 RX ORDER — ROSUVASTATIN CALCIUM 10 MG/1
10 TABLET, COATED ORAL NIGHTLY
Status: DISCONTINUED | OUTPATIENT
Start: 2018-04-12 | End: 2018-04-15 | Stop reason: HOSPADM

## 2018-04-12 RX ORDER — FUROSEMIDE 40 MG/1
40 TABLET ORAL DAILY
Status: DISCONTINUED | OUTPATIENT
Start: 2018-04-12 | End: 2018-04-15 | Stop reason: HOSPADM

## 2018-04-12 RX ADMIN — HEPARIN SODIUM 5000 UNITS: 5000 INJECTION, SOLUTION INTRAVENOUS; SUBCUTANEOUS at 01:04

## 2018-04-12 RX ADMIN — NITROGLYCERIN 0.4 MG: 0.4 TABLET SUBLINGUAL at 07:04

## 2018-04-12 RX ADMIN — NIFEDIPINE 20 MG: 20 CAPSULE, LIQUID FILLED ORAL at 08:04

## 2018-04-12 RX ADMIN — CARVEDILOL 25 MG: 12.5 TABLET, FILM COATED ORAL at 09:04

## 2018-04-12 RX ADMIN — SODIUM BICARBONATE 650 MG TABLET 650 MG: at 09:04

## 2018-04-12 RX ADMIN — HYDRALAZINE HYDROCHLORIDE 100 MG: 25 TABLET, FILM COATED ORAL at 08:04

## 2018-04-12 RX ADMIN — LATANOPROST 1 DROP: 50 SOLUTION OPHTHALMIC at 12:04

## 2018-04-12 RX ADMIN — ASPIRIN 325 MG ORAL TABLET 325 MG: 325 PILL ORAL at 09:04

## 2018-04-12 RX ADMIN — HYDROXYCHLOROQUINE SULFATE 200 MG: 200 TABLET, FILM COATED ORAL at 09:04

## 2018-04-12 RX ADMIN — HEPARIN SODIUM 5000 UNITS: 5000 INJECTION, SOLUTION INTRAVENOUS; SUBCUTANEOUS at 09:04

## 2018-04-12 RX ADMIN — CARVEDILOL 25 MG: 12.5 TABLET, FILM COATED ORAL at 08:04

## 2018-04-12 RX ADMIN — SODIUM BICARBONATE 650 MG TABLET 650 MG: at 12:04

## 2018-04-12 RX ADMIN — NITROGLYCERIN 1 INCH: 20 OINTMENT TOPICAL at 07:04

## 2018-04-12 RX ADMIN — ROSUVASTATIN CALCIUM 10 MG: 10 TABLET, FILM COATED ORAL at 09:04

## 2018-04-12 RX ADMIN — HYDRALAZINE HYDROCHLORIDE 100 MG: 25 TABLET, FILM COATED ORAL at 09:04

## 2018-04-12 RX ADMIN — CITALOPRAM HYDROBROMIDE 30 MG: 20 TABLET ORAL at 12:04

## 2018-04-12 RX ADMIN — FUROSEMIDE 40 MG: 40 TABLET ORAL at 12:04

## 2018-04-12 NOTE — HPI
Ms. Arnold is a 72 year old woman discharged from here on 4/6/18 after a 5 day hospital stay for uncontrolled HTN.  She felt well initially, but returned to the ED this morning with worsening shortness of breath over the last two days.  She got up to use the bathroom at 3 AM and suddenly experienced a tightness in her chest with dyspnea.  On presentation here her BP was 210/95 with BNP 4000 and pulmonary edema on CXR.  She was given her home blood pressure medication and a NTG patch was applied, after which BP dropped abruptly to 81/46.    Medical history includes chronic heart failure with diastolic dysfunction, aortic stenosis and mitral regurgitation.  She has refractory hypertension thought due to renal artery stenosis and hyperaldosteronism.  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

## 2018-04-12 NOTE — ASSESSMENT & PLAN NOTE
- Flash pulmonary edema due to dietary indiscretion and subsequent hypertensive crisis  - Contributing factors of chronic diastolic heart failure and valvular heart disease.  - Patient compliant with home medications.  - Continue diuresis and close blood pressure monitoring

## 2018-04-12 NOTE — CONSULTS
Consult Note  Nephrology    Consult Requested By: Marily Castro MD  Reason for Consult: MARYAM/HTN    SUBJECTIVE:     History of Present Illness:  Patient is a 72 y.o. female presents with worsening of shortness of breath over the last 2 days.  Patient well known to our group from frequent hospitalizations and followed by Dr. Valadez.  Thorough evaluation over the prior 2 admissions for renal artery stenosis and hyperaldosteronism.  Significant medical history as detailed below.  She has been difficult to manage in regards to her blood pressure.  However after reviewing her home blood pressure monitor her blood pressures have been very stable over the past week with the exception of last night and this morning.  She has been very adherent to her medication regimen but admits to eating a large roast beef pull boy yesterday that was very salty.  While in the emergency room her blood pressure was 230/106, she was given her home blood pressure medications in addition to nitroglycerin paste.  Her blood pressure plummeted to 80s over 40s and had to be placed in Trendelenburg position.  Patient responded to this treatment and blood pressure stabilized with a current blood pressure of 130/60.    Case discussed with Dr. Castro.  Patient seen and examined and feeling better.  Creatinine slightly above her baseline.    Epic reviewed.    Present patient says her shortness of breath is improved and denies any chest pain, nausea, vomiting, diarrhea, fever, chills.    Past Medical History:   Diagnosis Date    Acute on chronic diastolic congestive heart failure 11/17/2017    Allergy     Anatomical narrow angle glaucoma     Anemia     States diagnosed about a month ago    Aortic atherosclerosis     Arthritis     Cataract     CHF (congestive heart failure)     Chronic kidney disease     Chronic sciatica of left side     Right lower back pain due to sciatica    Coronary artery disease     Depression     Dry eyes      GERD (gastroesophageal reflux disease)     History of colon cancer     Hyperaldosteronism     Hyperlipidemia     Hypertension     Hypothyroidism     Left ventricular diastolic dysfunction with preserved systolic function     Lupus (systemic lupus erythematosus) 8/17/2012    Malnutrition 5/16/2017    Osteoarthritis of cervical spine 8/17/2012    Osteopenia     PAD (peripheral artery disease)     FRAN (renal artery stenosis)      Past Surgical History:   Procedure Laterality Date    CARDIAC CATHETERIZATION  07/27/2011    x1    CHOLECYSTECTOMY  1999    COLON SURGERY  2000 & 2003    partial removal    COLONOSCOPY N/A 4/14/2016    Procedure: COLONOSCOPY;  Surgeon: Jose Steen MD;  Location: [facility] (4TH FLR);  Service: Endoscopy;  Laterality: N/A;  prep 2 days prior light meals only/clear liquid day before  and 4 dulcolax tabs (5 mgs) at noon    COLONOSCOPY N/A 9/14/2017    Procedure: COLONOSCOPY;  Surgeon: Jose Steen MD;  Location: [facility] (4TH FLR);  Service: Endoscopy;  Laterality: N/A;    EYE SURGERY      HAND SURGERY Bilateral 1996 & 1997    due to carpal tunnel     HERNIA REPAIR      HYSTERECTOMY  1983    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    OOPHORECTOMY      removed one    Peripheral Iridotomy both eyes  1994    laser angle correction    THYROIDECTOMY, PARTIAL  2006    to remove two nodules and one-half thyroid     Family History   Problem Relation Age of Onset    Heart attack Mother     Hypertension Mother     Heart disease Father     Hypertension Father     Diabetes Maternal Grandmother     Glaucoma Maternal Grandmother     Hypertension Sister     Kidney disease Brother     Kidney failure Brother      received donated kidney from sister    No Known Problems Daughter     Diabetes Son     Hypertension Brother     Diabetes Maternal Aunt     No Known Problems Maternal Grandfather     No Known Problems Paternal Grandmother     No Known Problems Paternal  Grandfather     Acne Neg Hx     Eczema Neg Hx     Lupus Neg Hx     Psoriasis Neg Hx     Melanoma Neg Hx     Amblyopia Neg Hx     Blindness Neg Hx     Cataracts Neg Hx     Macular degeneration Neg Hx     Retinal detachment Neg Hx     Strabismus Neg Hx      Social History   Substance Use Topics    Smoking status: Former Smoker     Packs/day: 1.50     Years: 30.00     Types: Cigarettes     Start date: 1955     Quit date: 3/13/1985    Smokeless tobacco: Never Used    Alcohol use No       Review of patient's allergies indicates:   Allergen Reactions    Ace inhibitors Swelling     Lips Swelling    Vioxx [rofecoxib] Swelling      lips swelling    Bactrim [sulfamethoxazole-trimethoprim]      Pt would like this medication to be added due to elevation of creatinine with single kidney.    Clonidine Hallucinations     Hallucinations    Lactose     Arthrotec 50 [diclofenac-misoprostol]      Unknown    Bentyl [dicyclomine]      Not effective    Doxycycline Nausea Only    Imdur [isosorbide mononitrate] Nausea Only    Lomotil [diphenoxylate-atropine] Nausea And Vomiting     Stomach cramps with vomitting    Lozol [indapamide] Rash    Norvasc [amlodipine]      Not tolerated    Tramadol Nausea Only        Review of Systems:  Constitutional: No fever or chills  Respiratory:  shortness of breath  Cardiovascular: No chest pain or palpitations  Gastrointestinal: No nausea or vomiting  Neurological: No confusion or weakness    OBJECTIVE:     Vital Signs (Most Recent)  Temp: 97.9 °F (36.6 °C) (04/12/18 1108)  Pulse: 65 (04/12/18 1108)  Resp: (!) 21 (04/12/18 1108)  BP: 131/60 (04/12/18 1108)  SpO2: 98 % (04/12/18 1108)    Vital Signs Range (Last 24H):  Temp:  [97.7 °F (36.5 °C)-98.4 °F (36.9 °C)]   Pulse:  [58-78]   Resp:  [19-21]   BP: ()/()   SpO2:  [94 %-99 %]     No intake or output data in the 24 hours ending 04/12/18 1149    Physical Exam:  General appearance: Well developed, well  nourished  Eyes:  Conjunctivae/corneas clear. PERRL.  Lungs: Normal respiratory effort,   few crackles at base  Heart: Regular rate and rhythm, S1, S2 normal, + murmur, rub or ruma.  Abdomen: Soft, non-tender non-distended; bowel sounds normal; no masses,  no organomegaly  Extremities: No cyanosis or clubbing. No edema.    Skin: Skin color, texture, turgor normal. No rashes or lesions  Neurologic: Normal strength and tone. No focal numbness or weakness       Laboratory:    Recent Labs  Lab 04/12/18  0753   WBC 5.55   RBC 2.96*   HGB 7.8*   HCT 24.1*      MCV 81*   MCH 26.4*   MCHC 32.4     BMP:   Recent Labs  Lab 04/06/18  0443 04/12/18  0833   GLU 72 69*   * 136   K 5.1 5.1    106   CO2 18* 23   BUN 56* 47*   CREATININE 3.1* 3.3*   CALCIUM 8.6* 8.7   MG 1.7  --      Lab Results   Component Value Date    CALCIUM 8.7 04/12/2018    PHOS 4.6 (H) 04/06/2018     BNP    Recent Labs  Lab 04/12/18  0753   BNP 4,002*     Lab Results   Component Value Date    URICACID 10.7 (H) 03/20/2018     Lab Results   Component Value Date    IRON 32 04/02/2018    TIBC 250 04/02/2018    FERRITIN 261 04/04/2018     Lab Results   Component Value Date    .0 (H) 11/21/2016    CALCIUM 8.7 04/12/2018    PHOS 4.6 (H) 04/06/2018       Diagnostic Results:  X-Ray Chest 1 View   Final Result      Overall appearance suggestive of pulmonary edema.  Infectious process less likely.         Electronically signed by: Luis Bright MD   Date:    04/12/2018   Time:    08:40          ASSESSMENT/PLAN:     Labile nonoliguric MARYAM on CKD IV with uni-nephric hypertensive heart and acute on chronic diastolic HTN/ Acc HTN (N17.9, N18.4, I13.0, I50.33): Prior renal arterial doppler with some evidence of FRAN but repeat US (x2) reviewed by Dr. Lea does not show significant stenosis last month.  -Baseline creatinine around 3.0.  -Uni-nephric from renal donation  -Suspicious about primary hyperaldosteronism.  This is being worked up  again by Dr. Brizuela  -No ACE/ARB/Syd  -Renally dose meds, avoid nephrotoxins, and monitor I/O's closely.      Accelerated HTN likely due to excessive salt intake (I 13.0):  -See history of present illness  -Home monitor reviewed and stable up until last night  -Admits to eating salty roast beef poboy  -Extremely frail and labile blood pressure as evidenced by events and emergency room this morning.  Systolic blood pressure range from   -Restart home medications with hold parameters  -flash Pulmonary edema likely from hypertensive crisis      SLE (M32.9):  Continue plaquenil.  Defer. Anti-histone negative.      Thanks for consultation  See above orders and recommendations  We'll follow along with you

## 2018-04-12 NOTE — SUBJECTIVE & OBJECTIVE
Past Medical History:   Diagnosis Date    Acute on chronic diastolic congestive heart failure 11/17/2017    Allergy     Anatomical narrow angle glaucoma     Anemia     States diagnosed about a month ago    Aortic atherosclerosis     Arthritis     Cataract     CHF (congestive heart failure)     Chronic kidney disease     Chronic sciatica of left side     Right lower back pain due to sciatica    Coronary artery disease     Depression     Dry eyes     GERD (gastroesophageal reflux disease)     History of colon cancer     Hyperaldosteronism     Hyperlipidemia     Hypertension     Hypothyroidism     Left ventricular diastolic dysfunction with preserved systolic function     Lupus (systemic lupus erythematosus) 8/17/2012    Malnutrition 5/16/2017    Osteoarthritis of cervical spine 8/17/2012    Osteopenia     PAD (peripheral artery disease)     FRAN (renal artery stenosis)        Past Surgical History:   Procedure Laterality Date    CARDIAC CATHETERIZATION  07/27/2011    x1    CHOLECYSTECTOMY  1999    COLON SURGERY  2000 & 2003    partial removal    COLONOSCOPY N/A 4/14/2016    Procedure: COLONOSCOPY;  Surgeon: Jose Steen MD;  Location: [facility] ([address] [place] [address]);  Service: Endoscopy;  Laterality: N/A;  prep 2 days prior light meals only/clear liquid day before  and 4 dulcolax tabs (5 mgs) at noon    COLONOSCOPY N/A 9/14/2017    Procedure: COLONOSCOPY;  Surgeon: Jose Steen MD;  Location: [facility] ([address] [place] [address]);  Service: Endoscopy;  Laterality: N/A;    EYE SURGERY      HAND SURGERY Bilateral 1996 & 1997    due to carpal tunnel     HERNIA REPAIR      HYSTERECTOMY  1983    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    Clonidine Hallucinations     Hallucinations    Lactose     Arthrotec 50 [diclofenac-misoprostol]      Unknown    Bentyl [dicyclomine]      Not effective    Doxycycline Nausea Only    Imdur [isosorbide mononitrate] Nausea Only    Lomotil [diphenoxylate-atropine] Nausea And Vomiting     Stomach cramps with vomitting    Lozol [indapamide] Rash    Norvasc [amlodipine]      Not tolerated    Tramadol Nausea Only       No current facility-administered medications on file prior to encounter.      Current Outpatient Prescriptions on File Prior to Encounter   Medication Sig    aspirin (ECOTRIN) 81 MG EC tablet Take 1 tablet (81 mg total) by mouth once daily.    carvedilol (COREG) 25 MG tablet Take 1 tablet (25 mg total) by mouth 2 (two) times daily.    citalopram (CELEXA) 20 MG tablet Take 1.5 tablets (30 mg total) by mouth once daily.    famotidine (PEPCID) 20 MG tablet Take 1 tablet (20 mg total) by mouth once daily.    furosemide (LASIX) 40 MG tablet Take 1 tablet (40 mg total) by mouth once daily.    hydrALAZINE (APRESOLINE) 100 MG tablet Take 1 tablet (100 mg total) by mouth every 12 (twelve) hours.    hydroxychloroquine (PLAQUENIL) 200 mg tablet TAKE 1 TABLET TWICE DAILY    latanoprost 0.005 % ophthalmic solution INSTILL 1 DROP INTO BOTH EYES EVERY DAY    levothyroxine (SYNTHROID) 75 MCG tablet Take 1 tablet (75 mcg total) by mouth before breakfast.    NIFEdipine (PROCARDIA-XL) 60 MG (OSM) 24 hr tablet Take 1 tablet (60 mg total) by mouth once daily.    rosuvastatin (CRESTOR) 10 MG tablet Take 1 tablet (10 mg total) by mouth every evening.    sodium bicarbonate 650 MG tablet Take 2 tablets (1,300 mg total) by mouth 2 (two) times daily.    [DISCONTINUED] acetaminophen (TYLENOL) 325 MG tablet Take 2  tablets (650 mg total) by mouth every 6 (six) hours.     Family History     Problem Relation (Age of Onset)    Diabetes Maternal Grandmother, Son, Maternal Aunt    Glaucoma Maternal Grandmother    Heart attack Mother    Heart disease Father    Hypertension Mother, Father, Sister, Brother    Kidney disease Brother    Kidney failure Brother    No Known Problems Daughter, Maternal Grandfather, Paternal Grandmother, Paternal Grandfather        Social History Main Topics    Smoking status: Former Smoker     Packs/day: 1.50     Years: 30.00     Types: Cigarettes     Start date: 1955     Quit date: 3/13/1985    Smokeless tobacco: Never Used    Alcohol use No    Drug use: No    Sexual activity: Not Currently     Partners: Male     Birth control/ protection: Abstinence     Review of Systems   Constitutional: Negative for chills and fever.   HENT: Negative for rhinorrhea and sore throat.    Eyes: Negative for photophobia and visual disturbance.   Respiratory: Positive for chest tightness and shortness of breath. Negative for cough.    Cardiovascular: Negative for chest pain and palpitations.   Gastrointestinal: Negative for constipation, diarrhea, nausea and vomiting.   Endocrine: Negative for polydipsia and polyuria.   Genitourinary: Negative for dysuria and frequency.   Musculoskeletal: Negative for back pain and gait problem.   Neurological: Negative for dizziness, weakness and headaches.   Psychiatric/Behavioral: Negative for confusion and dysphoric mood.     Objective:     Vital Signs (Most Recent):  Temp: 98.5 °F (36.9 °C) (04/12/18 1632)  Pulse: 75 (04/12/18 1632)  Resp: 16 (04/12/18 1632)  BP: (!) 184/87 (04/12/18 1632)  SpO2: 99 % (04/12/18 1632) Vital Signs (24h Range):  Temp:  [97.7 °F (36.5 °C)-98.5 °F (36.9 °C)] 98.5 °F (36.9 °C)  Pulse:  [58-78] 75  Resp:  [16-21] 16  SpO2:  [94 %-99 %] 99 %  BP: ()/() 184/87     Weight: 49.9 kg (110 lb)  Body mass index is 18.3 kg/m².    Physical Exam    Constitutional: She is oriented to person, place, and time. She appears well-developed and well-nourished.   HENT:   Head: Normocephalic.   Eyes: Conjunctivae are normal. Pupils are equal, round, and reactive to light.   Neck: Neck supple. No thyromegaly present.   Cardiovascular: Normal rate, regular rhythm and intact distal pulses.  Exam reveals no gallop and no friction rub.    Murmur heard.  Pulmonary/Chest: Effort normal. She has rales.   Soft crackles at bases.   Abdominal: Soft. Bowel sounds are normal. She exhibits no distension. There is no tenderness.   Musculoskeletal: Normal range of motion. She exhibits no edema.   Lymphadenopathy:     She has no cervical adenopathy.   Neurological: She is alert and oriented to person, place, and time.   Strength equal and symmetric   Skin: Skin is warm and dry. No rash noted.   Psychiatric: She has a normal mood and affect. Her behavior is normal. Thought content normal.         CRANIAL NERVES     CN III, IV, VI   Pupils are equal, round, and reactive to light.       Significant Labs:   BMP:   Recent Labs  Lab 04/12/18  0833   GLU 69*      K 5.1      CO2 23   BUN 47*   CREATININE 3.3*   CALCIUM 8.7     CBC:   Recent Labs  Lab 04/12/18  0753   WBC 5.55   HGB 7.8*   HCT 24.1*          Significant Imaging: I have reviewed all pertinent imaging results/findings within the past 24 hours.

## 2018-04-12 NOTE — ED NOTES
Pt to ED with SOB x 2 days with CP that began this AM around 0300 today. Pt with mild increase in RR, no use of accessory muscles. Pt oxygen saturations at 98% on room air. Pt reporting [place] [facility] [place]. Pt denies nausea, vomiting, recent fever or chills. Pt AAOx4 and appropriate at this time. No acute distress noted.  Awaiting further orders. Pt updated on POC. Bed is locked and in lowest position with side rails up x2. Call bell within reach and pt oriented to use of call bell. Pt placed on continuous cardiac monitoring, continuous pulse ox, and continuous BP cuff. Will continue to monitor. Family member at bedside.

## 2018-04-12 NOTE — ED NOTES
MD notified of pt BP of 86/50. Removed transdermal nitro patch. Pt awake, Ox4, speaking in clear sentences and appropriate at this time. Respirations even and unlabored. No acute distress noted. Pt denies nausea, vomiting, dizziness. Pt placed on q 5 min. BP monitoring.  Will cont monitor.

## 2018-04-12 NOTE — ASSESSMENT & PLAN NOTE
- Creatinine marginally worse than on discharge but BUN improved.  Suspect she is at new baseline.  - Solitary kidney status  - Followed by Dr. Brizuela

## 2018-04-12 NOTE — ASSESSMENT & PLAN NOTE
- As above  - Patient with possible hyperaldosteronism, nephrology service following her as outpatient and continuing this workup.  - BP stabilized currently, continue close monitoring and diurese.

## 2018-04-12 NOTE — ED NOTES
Phleb at bedside for blood recollection. Pt brought to xray at this time. She reports she will come back.

## 2018-04-12 NOTE — ED NOTES
Appearance: Pt awake, alert & oriented to person, place & time. Pt in no acute distress at present time. Pt is clean and well groomed with clothes appropriately fastened. Pt is thin.   Skin: Skin warm, dry & intact. Pt with generalized pallor. Mucous membranes moist. No breakdown noted.   Musculoskeletal: Patient moving all extremities well, 1+ bilateral LE swelling. Sensation intact. Denies numbness or tingling in bilateral LE's.   Respiratory: Mild increase in RR noted while lying stretcher. Pt is 98% on room air. Visible chest rise noted. Airway is open and patent. No accessory muscle use noted.   Neurologic: Sensation is intact. Speech is clear and appropriate. Eyes open spontaneously, behavior appropriate to situation, follows commands, facial expression symmetrical, bilateral hand grasp equal and even, purposeful motor response noted.  Cardiac: All peripheral pulses present. No Bilateral lower extremity edema. Cap refill is <3 seconds. Pt denies dizziness, blurred vision, weakness or fatigue at this time. She does report chest discomfort with SOB.   Abdomen: Abdomen soft, non-tender to palpation. Pt denies active abd pains, cramping or discomfort, No N/V/D at this time.   : Pt reports no dysuria or hematuria. Pt with PMH of decreased urine output related solitary kidney. Pt reporting last urination was this AM.

## 2018-04-12 NOTE — ED PROVIDER NOTES
Encounter Date: 4/12/2018    SCRIBE #1 NOTE: I, Myriam Snell, am scribing for, and in the presence of, Dr. Antonio.       History     Chief Complaint   Patient presents with    Shortness of Breath     Pt c/o increased SOB X 3-4 days. Pt has PMH of CHF & c/o exacerbation with difficulty lying flat. Pt also reports dyspnea upon exertion. Pt also c/o chest tightness.     Time seen by provider: 7:37 AM    This is a 72 y.o. female, with a history of CHF, CKD, CAD, colon cancer, Lupus, and renal artery stenosis, who presents with complaint of shortness of breath that began two days ago. Patient reports shortness of breath has been gradually worsening. Shortness of breath worsens with exertion. She reports chest pain that began about four hours ago. Chest pain is described as tightness. She reports feeling fluid overloaded. She denies diaphoresis, cough, wheezing, leg swelling, abdominal distention, difficulty urinating, joint swelling, or rash. She did not take her daily medications prior to arrival. She denies at home oxygen use. She denies tobacco use.       The history is provided by the patient.     Review of patient's allergies indicates:   Allergen Reactions    Ace inhibitors Swelling     Lips Swelling    Vioxx [rofecoxib] Swelling      lips swelling    Bactrim [sulfamethoxazole-trimethoprim]      Pt would like this medication to be added due to elevation of creatinine with single kidney.    Clonidine Hallucinations     Hallucinations    Procardia [nifedipine] Nausea Only and Edema     Feet swelling and nausea, now reports that this has happened in the past and required lasix    Lactose     Arthrotec 50 [diclofenac-misoprostol]      Unknown    Bentyl [dicyclomine]      Not effective    Doxycycline Nausea Only    Imdur [isosorbide mononitrate] Nausea Only    Lomotil [diphenoxylate-atropine] Nausea And Vomiting     Stomach cramps with vomitting    Lozol [indapamide] Rash    Norvasc [amlodipine]       Not tolerated    Tramadol Nausea Only     Past Medical History:   Diagnosis Date    Acute on chronic diastolic congestive heart failure 11/17/2017    Allergy     Anatomical narrow angle glaucoma     Anemia     States diagnosed about a month ago    Aortic atherosclerosis     Arthritis     Cataract     CHF (congestive heart failure)     Chronic kidney disease     Chronic sciatica of left side     Right lower back pain due to sciatica    Coronary artery disease     Depression     Dry eyes     GERD (gastroesophageal reflux disease)     History of colon cancer     Hyperaldosteronism     Hyperlipidemia     Hypertension     Hypothyroidism     Left ventricular diastolic dysfunction with preserved systolic function     Lupus (systemic lupus erythematosus) 8/17/2012    Malnutrition 5/16/2017    Osteoarthritis of cervical spine 8/17/2012    Osteopenia     PAD (peripheral artery disease)     FRAN (renal artery stenosis)      Past Surgical History:   Procedure Laterality Date    CARDIAC CATHETERIZATION  07/27/2011    x1    CHOLECYSTECTOMY  1999    COLON SURGERY  2000 & 2003    partial removal    COLONOSCOPY N/A 4/14/2016    Procedure: COLONOSCOPY;  Surgeon: Jose Steen MD;  [facility]: [facility]  Service: Endoscopy;  Laterality: N/A;  prep 2 days prior light meals only/clear liquid day before  and 4 dulcolax tabs (5 mgs) at noon    COLONOSCOPY N/A 9/14/2017    Procedure: COLONOSCOPY;  Surgeon: Jose Steen MD;  Location: [facility] ([facility]);  Service: Endoscopy;  Laterality: N/A;    EYE SURGERY      HAND SURGERY Bilateral 1996 & 1997    due to carpal tunnel     HERNIA REPAIR      HYSTERECTOMY  1983    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    OOPHORECTOMY      removed one    Peripheral Iridotomy both eyes  1994    laser angle correction    THYROIDECTOMY, PARTIAL  2006    to remove two nodules and one-half thyroid     Family History   Problem Relation Age of Onset    Heart  attack Mother     Hypertension Mother     Heart disease Father     Hypertension Father     Diabetes Maternal Grandmother     Glaucoma Maternal Grandmother     Hypertension Sister     Kidney disease Brother     Kidney failure Brother      received donated kidney from sister    No Known Problems Daughter     Diabetes Son     Hypertension Brother     Diabetes Maternal Aunt     No Known Problems Maternal Grandfather     No Known Problems Paternal Grandmother     No Known Problems Paternal Grandfather     Acne Neg Hx     Eczema Neg Hx     Lupus Neg Hx     Psoriasis Neg Hx     Melanoma Neg Hx     Amblyopia Neg Hx     Blindness Neg Hx     Cataracts Neg Hx     Macular degeneration Neg Hx     Retinal detachment Neg Hx     Strabismus Neg Hx      Social History   Substance Use Topics    Smoking status: Former Smoker     Packs/day: 1.50     Years: 30.00     Types: Cigarettes     Start date: 1955     Quit date: 3/13/1985    Smokeless tobacco: Never Used    Alcohol use No     Review of Systems   Constitutional: Negative for chills, diaphoresis and fever.   HENT: Negative for congestion and sore throat.    Eyes: Negative for visual disturbance.   Respiratory: Positive for shortness of breath. Negative for cough and wheezing.    Cardiovascular: Positive for chest pain. Negative for leg swelling.   Gastrointestinal: Negative for abdominal distention, abdominal pain, diarrhea, nausea and vomiting.   Genitourinary: Negative for difficulty urinating, dysuria, frequency, hematuria and urgency.   Musculoskeletal: Negative for joint swelling.   Skin: Negative for rash.   Neurological: Negative for dizziness, light-headedness and headaches.   Psychiatric/Behavioral: Negative for confusion.       Physical Exam     Initial Vitals [04/12/18 0725]   BP Pulse Resp Temp SpO2   (!) 210/95 78 20 97.7 °F (36.5 °C) 99 %      MAP       133.33         Physical Exam    Nursing note and vitals reviewed.  Constitutional: She  appears well-developed. She is cooperative.  Non-toxic appearance. No distress.   Thin. Mildly anxious appearing.    HENT:   Head: Normocephalic and atraumatic.   Mouth/Throat: Oropharynx is clear and moist.   Eyes: Conjunctivae and EOM are normal. Pupils are equal, round, and reactive to light.   Neck: Normal range of motion and full passive range of motion without pain. Neck supple. No thyromegaly present. No JVD present.   Cardiovascular: Normal rate, regular rhythm, normal heart sounds and normal pulses.   Pulmonary/Chest: Effort normal. Tachypnea (Mild) noted. No respiratory distress. She has no wheezes. She has no rhonchi. She has no rales.   Mild increase of WOB.    Abdominal: Soft. Normal appearance and bowel sounds are normal. She exhibits no distension and no mass. There is no tenderness.   Musculoskeletal: Normal range of motion.   No lower extremity edema.    Neurological: She is alert and oriented to person, place, and time. She has normal strength. No cranial nerve deficit or sensory deficit.   Skin: Skin is warm, dry and intact. No rash noted.   Psychiatric: She has a normal mood and affect. Her speech is normal and behavior is normal. Judgment and thought content normal.         ED Course   Critical Care  Date/Time: 4/12/2018 10:09 AM  Performed by: MARLON COHEN  Authorized by: MARLON COHEN   Total critical care time (exclusive of procedural time) : 45 minutes  Critical care was necessary to treat or prevent imminent or life-threatening deterioration of the following conditions: renal failure and shock.  Critical care was time spent personally by me on the following activities: review of old charts, pulse oximetry, ordering and review of laboratory studies, obtaining history from patient or surrogate and examination of patient.        Labs Reviewed   CBC W/ AUTO DIFFERENTIAL - Abnormal; Notable for the following:        Result Value    RBC 2.96 (*)     Hemoglobin 7.8 (*)     Hematocrit 24.1  (*)     MCV 81 (*)     MCH 26.4 (*)     RDW 14.7 (*)     MPV 9.1 (*)     All other components within normal limits   COMPREHENSIVE METABOLIC PANEL - Abnormal; Notable for the following:     Glucose 69 (*)     BUN, Bld 47 (*)     Creatinine 3.3 (*)     Albumin 3.0 (*)     Anion Gap 7 (*)     eGFR if  15 (*)     eGFR if non  13 (*)     All other components within normal limits    Narrative:     Recoll. [id number] by OLR at 04/12/2018 08:11, reason: Specimen   hemolyzed spoke to Daksha Tirado RN request lab to recollect   B-TYPE NATRIURETIC PEPTIDE - Abnormal; Notable for the following:     BNP 4,002 (*)     All other components within normal limits   TROPONIN I - Abnormal; Notable for the following:     Troponin I 0.231 (*)     All other components within normal limits    Narrative:     Recoll. [id number] by OLR at 04/12/2018 08:11, reason: Specimen   hemolyzed spoke to Daksha Tirado RN request lab to recollect     EKG Readings: (Independently Interpreted)   Normal sinus rhythm at a rate of 79 bpm. Normal axis. Biphasic T waves in V5 and V6 new compared to an EKG from 9 days ago. No STEMI.      Imaging Results          X-Ray Chest 1 View (Final result)  Result time 04/12/18 08:40:21    Final result by Luis Bright MD (04/12/18 08:40:21)                 Impression:      Overall appearance suggestive of pulmonary edema.  Infectious process less likely.      Electronically signed by: Luis Bright MD  Date:    04/12/2018  Time:    08:40             Narrative:    EXAMINATION:  XR CHEST 1 VIEW    CLINICAL HISTORY:  Shortness of breath    TECHNIQUE:  Single frontal view of the chest was performed.    COMPARISON:  04/01/2018    FINDINGS:  Diffuse bilateral interstitial opacification, new from prior study.  Small bilateral pleural effusions noted.  No confluent consolidation.  No pneumothorax.  Cardiac silhouette is enlarged.                                X-Rays:   Independently  Interpreted Readings:   Chest X-Ray: Cardiomegaly. Increased pulmonary vascular markings.      Medical Decision Making:   Initial Assessment:     Urgent evaluation a 72-year-old female with chronic anemia, CK D w solitary kidney (Cr 3-2, 3.1 at discharge), diastolic CHF, and pulmonary hypertension, and lupus here with shortness of breath.  Patient was discharged 3/28 for hypertensive urgency and once again 4/6 after 5 day admission for cristina (in eICU w cards recs to keep sbp> 170 w prn hydralazine). Pt follows with [facility]- nephrology.  Patient sent home on Procardia, hydralazine, Coreg, and she reports taking lasix. Pt here today w 2 days of shortness of breath, worse when laying flat.On exam pt anxious, with mild increased work of breathing, no overt fluid overload. Suspect complex acute on chronic chf w acute hypertensive urgency. Will gently decrease bp obtain labs and imaging and reassess  Independently Interpreted Test(s):   I have ordered and independently interpreted X-rays - see prior notes.  I have ordered and independently interpreted EKG Reading(s) - see prior notes  Clinical Tests:   Lab Tests: Ordered and Reviewed  Radiological Study: Ordered and Reviewed  Medical Tests: Ordered and Reviewed  ED Management:  Pt feeling improved after nitro, home antihypertensives given.     9:49 AM  Pt now with profound hypotension- nitro paste had been left on, now removed. Lasix held. labs w trop 0.231 double from baseline, Cr 3.3, stable anemia, elevated bnp  Given above derangements and such bp lability in setting of symptomatic pulm congestion will admit for further stabilization.     10:01 AM. Case discussed with Dr. Vergara, who will admit the patient to Dr. Castro.                 Attending Attestation:           Physician Attestation for Scribe:  Physician Attestation Statement for Scribe #1: I, Dr. Antonio, reviewed documentation, as scribed by Myriam Snell in my presence, and it is both accurate and  complete.                    Clinical Impression:     1. Resistant hypertension    2. Shortness of breath    3. Chronic kidney disease, unspecified CKD stage    4. Acute on chronic diastolic heart failure    5. Pulmonary edema cardiac cause    6. Elevated troponin          Disposition:   Disposition: Admitted  Condition: Melani Antonio MD  04/12/18 1018

## 2018-04-12 NOTE — H&P
[facility] Medicine  History & Physical    Patient Name: Ewelina Arnold  MRN: [id number]  Admission Date: 4/12/2018  Attending Physician: Marily Castro MD   Primary Care Provider: Kalie Walton MD         Patient information was obtained from patient, past medical records and ER records.     Subjective:     Principal Problem:Flash pulmonary edema    Chief Complaint:   Chief Complaint   Patient presents with    Shortness of Breath     Pt c/o increased SOB X 3-4 days. Pt has PMH of CHF & c/o exacerbation with difficulty lying flat. Pt also reports dyspnea upon exertion. Pt also c/o chest tightness.        HPI: Ms. Arnold is a 72 year old woman discharged from here on 4/6/18 after a 5 day hospital stay for uncontrolled HTN.  She felt well initially, but returned to the ED this morning with worsening shortness of breath over the last two days.  She got up to use the bathroom at 3 AM and suddenly experienced a tightness in her chest with dyspnea.  On presentation here her BP was 210/95 with BNP 4000 and pulmonary edema on CXR.  She was given her home blood pressure medication and a NTG patch was applied, after which BP dropped abruptly to 81/46.    Medical history includes chronic heart failure with diastolic dysfunction, aortic stenosis and mitral regurgitation.  She has refractory hypertension thought due to renal artery stenosis and hyperaldosteronism.  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    Past Medical History:   Diagnosis Date    Acute on chronic diastolic congestive heart failure 11/17/2017    Allergy     Anatomical narrow angle glaucoma     Anemia     States diagnosed about a month ago    Aortic atherosclerosis     Arthritis     Cataract     CHF (congestive heart failure)     Chronic kidney  disease     Chronic sciatica of left side     Right lower back pain due to sciatica    Coronary artery disease     Depression     Dry eyes     GERD (gastroesophageal reflux disease)     History of colon cancer     Hyperaldosteronism     Hyperlipidemia     Hypertension     Hypothyroidism     Left ventricular diastolic dysfunction with preserved systolic function     Lupus (systemic lupus erythematosus) 8/17/2012    Malnutrition 5/16/2017    Osteoarthritis of cervical spine 8/17/2012    Osteopenia     PAD (peripheral artery disease)     FRAN (renal artery stenosis)        Past Surgical History:   Procedure Laterality Date    CARDIAC CATHETERIZATION  07/27/2011    x1    CHOLECYSTECTOMY  1999    COLON SURGERY  2000 & 2003    partial removal    COLONOSCOPY N/A 4/14/2016    Procedure: COLONOSCOPY;  Surgeon: Jose Steen MD;  Location: [facility] ENDO (4TH FLR);  Service: Endoscopy;  Laterality: N/A;  prep 2 days prior light meals only/clear liquid day before  and 4 dulcolax tabs (5 mgs) at noon    COLONOSCOPY N/A 9/14/2017    Procedure: COLONOSCOPY;  Surgeon: Jose Steen MD;  [facility]  Service: Endoscopy;  Laterality: N/A;    EYE SURGERY      HAND SURGERY Bilateral 1996 & 1997    due to carpal tunnel     HERNIA REPAIR      HYSTERECTOMY  1983    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    Clonidine Hallucinations     Hallucinations    Lactose     Arthrotec 50 [diclofenac-misoprostol]      Unknown     Bentyl [dicyclomine]      Not effective    Doxycycline Nausea Only    Imdur [isosorbide mononitrate] Nausea Only    Lomotil [diphenoxylate-atropine] Nausea And Vomiting     Stomach cramps with vomitting    Lozol [indapamide] Rash    Norvasc [amlodipine]      Not tolerated    Tramadol Nausea Only       No current facility-administered medications on file prior to encounter.      Current Outpatient Prescriptions on File Prior to Encounter   Medication Sig    aspirin (ECOTRIN) 81 MG EC tablet Take 1 tablet (81 mg total) by mouth once daily.    carvedilol (COREG) 25 MG tablet Take 1 tablet (25 mg total) by mouth 2 (two) times daily.    citalopram (CELEXA) 20 MG tablet Take 1.5 tablets (30 mg total) by mouth once daily.    famotidine (PEPCID) 20 MG tablet Take 1 tablet (20 mg total) by mouth once daily.    furosemide (LASIX) 40 MG tablet Take 1 tablet (40 mg total) by mouth once daily.    hydrALAZINE (APRESOLINE) 100 MG tablet Take 1 tablet (100 mg total) by mouth every 12 (twelve) hours.    hydroxychloroquine (PLAQUENIL) 200 mg tablet TAKE 1 TABLET TWICE DAILY    latanoprost 0.005 % ophthalmic solution INSTILL 1 DROP INTO BOTH EYES EVERY DAY    levothyroxine (SYNTHROID) 75 MCG tablet Take 1 tablet (75 mcg total) by mouth before breakfast.    NIFEdipine (PROCARDIA-XL) 60 MG (OSM) 24 hr tablet Take 1 tablet (60 mg total) by mouth once daily.    rosuvastatin (CRESTOR) 10 MG tablet Take 1 tablet (10 mg total) by mouth every evening.    sodium bicarbonate 650 MG tablet Take 2 tablets (1,300 mg total) by mouth 2 (two) times daily.    [DISCONTINUED] acetaminophen (TYLENOL) 325 MG tablet Take 2 tablets (650 mg total) by mouth every 6 (six) hours.     Family History     Problem Relation (Age of Onset)    Diabetes Maternal Grandmother, Son, Maternal Aunt    Glaucoma Maternal Grandmother    Heart attack Mother    Heart disease Father    Hypertension Mother, Father, Sister, Brother    Kidney disease Brother     Kidney failure Brother    No Known Problems Daughter, Maternal Grandfather, Paternal Grandmother, Paternal Grandfather        Social History Main Topics    Smoking status: Former Smoker     Packs/day: 1.50     Years: 30.00     Types: Cigarettes     Start date: 1955     Quit date: 3/13/1985    Smokeless tobacco: Never Used    Alcohol use No    Drug use: No    Sexual activity: Not Currently     Partners: Male     Birth control/ protection: Abstinence     Review of Systems   Constitutional: Negative for chills and fever.   HENT: Negative for rhinorrhea and sore throat.    Eyes: Negative for photophobia and visual disturbance.   Respiratory: Positive for chest tightness and shortness of breath. Negative for cough.    Cardiovascular: Negative for chest pain and palpitations.   Gastrointestinal: Negative for constipation, diarrhea, nausea and vomiting.   Endocrine: Negative for polydipsia and polyuria.   Genitourinary: Negative for dysuria and frequency.   Musculoskeletal: Negative for back pain and gait problem.   Neurological: Negative for dizziness, weakness and headaches.   Psychiatric/Behavioral: Negative for confusion and dysphoric mood.     Objective:     Vital Signs (Most Recent):  Temp: 98.5 °F (36.9 °C) (04/12/18 1632)  Pulse: 75 (04/12/18 1632)  Resp: 16 (04/12/18 1632)  BP: (!) 184/87 (04/12/18 1632)  SpO2: 99 % (04/12/18 1632) Vital Signs (24h Range):  Temp:  [97.7 °F (36.5 °C)-98.5 °F (36.9 °C)] 98.5 °F (36.9 °C)  Pulse:  [58-78] 75  Resp:  [16-21] 16  SpO2:  [94 %-99 %] 99 %  BP: ()/() 184/87     Weight: 49.9 kg (110 lb)  Body mass index is 18.3 kg/m².    Physical Exam   Constitutional: She is oriented to person, place, and time. She appears well-developed and well-nourished.   HENT:   Head: Normocephalic.   Eyes: Conjunctivae are normal. Pupils are equal, round, and reactive to light.   Neck: Neck supple. No thyromegaly present.   Cardiovascular: Normal rate, regular rhythm and intact  distal pulses.  Exam reveals no gallop and no friction rub.    Murmur heard.  Pulmonary/Chest: Effort normal. She has rales.   Soft crackles at bases.   Abdominal: Soft. Bowel sounds are normal. She exhibits no distension. There is no tenderness.   Musculoskeletal: Normal range of motion. She exhibits no edema.   Lymphadenopathy:     She has no cervical adenopathy.   Neurological: She is alert and oriented to person, place, and time.   Strength equal and symmetric   Skin: Skin is warm and dry. No rash noted.   Psychiatric: She has a normal mood and affect. Her behavior is normal. Thought content normal.         CRANIAL NERVES     CN III, IV, VI   Pupils are equal, round, and reactive to light.       Significant Labs:   BMP:   Recent Labs  Lab 04/12/18  0833   GLU 69*      K 5.1      CO2 23   BUN 47*   CREATININE 3.3*   CALCIUM 8.7     CBC:   Recent Labs  Lab 04/12/18  0753   WBC 5.55   HGB 7.8*   HCT 24.1*          Significant Imaging: I have reviewed all pertinent imaging results/findings within the past 24 hours.    Assessment/Plan:     * Flash pulmonary edema    - Flash pulmonary edema due to dietary indiscretion and subsequent hypertensive crisis  - Contributing factors of chronic diastolic heart failure and valvular heart disease.  - Patient compliant with home medications.  - Continue diuresis and close blood pressure monitoring              Hypertensive crisis    - As above  - Patient with possible hyperaldosteronism, nephrology service following her as outpatient and continuing this workup.  - BP stabilized currently, continue close monitoring and diurese.          History of unilateral nephrectomy LEFT    - Single kidney after donation to her brother in 1985.  - As above          Hyperaldosteronism    - As above          Anemia associated with chronic renal failure    - At baseline          CKD (chronic kidney disease), stage IV    - Creatinine marginally worse than on discharge but BUN  improved.  Suspect she is at new baseline.  - Solitary kidney status  - Followed by Dr. Brizuela          Left ventricular diastolic dysfunction with preserved systolic function    - Diastolic dysfunction and valvular heart disease with pulmonary hypertension on 2D echo from last week.  - Diurese            Other forms of systemic lupus erythematosus    - [place] [facility] [place]  - On plaquenil            VTE Risk Mitigation         Ordered     heparin (porcine) injection 5,000 Units  Every 8 hours     Route:  Subcutaneous        04/12/18 1121     IP VTE HIGH RISK PATIENT  Once      04/12/18 1121     IP VTE HIGH RISK PATIENT  Once      04/12/18 1121             Marily Denise MD  Department of Hospital Medicine   [facility]

## 2018-04-12 NOTE — HOSPITAL COURSE
Patient was admitted for treatment of severe hypertension and acute pulmonary edema.  She was seen by the nephrology service and was noted to have eaten a salty meal recently which likely caused the flash edema.  Her BP continued to be difficult to control after the initial drop in BP and elevations were associated with headaches.  Imdur 30 mg daily was added, and then her nifedipine was increased to 90 mg daily.  At discharge her BP was still labile and tended to be high in the morning prior to taking her morning meds.  Her headaches had resolved and she was feeling better in general and without shortness of breath.

## 2018-04-12 NOTE — ASSESSMENT & PLAN NOTE
- Diastolic dysfunction and valvular heart disease with pulmonary hypertension on 2D echo from last week.  - Diurese

## 2018-04-12 NOTE — ED NOTES
MD at bedside speaking with pt. Pt speaking in clear sentences. MD verbal order to hold off on lasix IV at this time.

## 2018-04-13 ENCOUNTER — TELEPHONE (OUTPATIENT)
Dept: FAMILY MEDICINE | Facility: CLINIC | Age: 73
End: 2018-04-13

## 2018-04-13 LAB
ANION GAP SERPL CALC-SCNC: 8 MMOL/L
BUN SERPL-MCNC: 46 MG/DL
CALCIUM SERPL-MCNC: 8.5 MG/DL
CHLORIDE SERPL-SCNC: 106 MMOL/L
CO2 SERPL-SCNC: 21 MMOL/L
CREAT SERPL-MCNC: 3.2 MG/DL
EST. GFR  (AFRICAN AMERICAN): 16 ML/MIN/1.73 M^2
EST. GFR  (NON AFRICAN AMERICAN): 14 ML/MIN/1.73 M^2
GLUCOSE SERPL-MCNC: 60 MG/DL
MAGNESIUM SERPL-MCNC: 1.6 MG/DL
PHOSPHATE SERPL-MCNC: 4 MG/DL
POTASSIUM SERPL-SCNC: 4.6 MMOL/L
SODIUM SERPL-SCNC: 135 MMOL/L
URATE SERPL-MCNC: 9.4 MG/DL

## 2018-04-13 PROCEDURE — 63600175 PHARM REV CODE 636 W HCPCS: Performed by: HOSPITALIST

## 2018-04-13 PROCEDURE — 84550 ASSAY OF BLOOD/URIC ACID: CPT

## 2018-04-13 PROCEDURE — 25000003 PHARM REV CODE 250: Performed by: HOSPITALIST

## 2018-04-13 PROCEDURE — 99900035 HC TECH TIME PER 15 MIN (STAT)

## 2018-04-13 PROCEDURE — 36415 COLL VENOUS BLD VENIPUNCTURE: CPT

## 2018-04-13 PROCEDURE — 83735 ASSAY OF MAGNESIUM: CPT

## 2018-04-13 PROCEDURE — 80048 BASIC METABOLIC PNL TOTAL CA: CPT

## 2018-04-13 PROCEDURE — 94761 N-INVAS EAR/PLS OXIMETRY MLT: CPT

## 2018-04-13 PROCEDURE — 11000001 HC ACUTE MED/SURG PRIVATE ROOM

## 2018-04-13 PROCEDURE — 63600175 PHARM REV CODE 636 W HCPCS: Performed by: PHYSICIAN ASSISTANT

## 2018-04-13 PROCEDURE — 27000221 HC OXYGEN, UP TO 24 HOURS

## 2018-04-13 PROCEDURE — 84100 ASSAY OF PHOSPHORUS: CPT

## 2018-04-13 PROCEDURE — 25000003 PHARM REV CODE 250: Performed by: NURSE PRACTITIONER

## 2018-04-13 PROCEDURE — 99233 SBSQ HOSP IP/OBS HIGH 50: CPT | Mod: ,,, | Performed by: HOSPITALIST

## 2018-04-13 RX ORDER — HYDRALAZINE HYDROCHLORIDE 20 MG/ML
10 INJECTION INTRAMUSCULAR; INTRAVENOUS EVERY 8 HOURS PRN
Status: DISCONTINUED | OUTPATIENT
Start: 2018-04-13 | End: 2018-04-15 | Stop reason: HOSPADM

## 2018-04-13 RX ORDER — BUTALBITAL, ACETAMINOPHEN AND CAFFEINE 50; 325; 40 MG/1; MG/1; MG/1
1 TABLET ORAL EVERY 6 HOURS PRN
Status: DISCONTINUED | OUTPATIENT
Start: 2018-04-13 | End: 2018-04-15 | Stop reason: HOSPADM

## 2018-04-13 RX ORDER — ISOSORBIDE MONONITRATE 30 MG/1
30 TABLET, EXTENDED RELEASE ORAL DAILY
Status: DISCONTINUED | OUTPATIENT
Start: 2018-04-13 | End: 2018-04-15 | Stop reason: HOSPADM

## 2018-04-13 RX ADMIN — ROSUVASTATIN CALCIUM 10 MG: 10 TABLET, FILM COATED ORAL at 08:04

## 2018-04-13 RX ADMIN — RAMELTEON 8 MG: 8 TABLET, FILM COATED ORAL at 10:04

## 2018-04-13 RX ADMIN — NIFEDIPINE 60 MG: 30 TABLET, FILM COATED, EXTENDED RELEASE ORAL at 06:04

## 2018-04-13 RX ADMIN — CITALOPRAM HYDROBROMIDE 30 MG: 20 TABLET ORAL at 06:04

## 2018-04-13 RX ADMIN — HYDRALAZINE HYDROCHLORIDE 10 MG: 20 INJECTION INTRAMUSCULAR; INTRAVENOUS at 10:04

## 2018-04-13 RX ADMIN — HEPARIN SODIUM 5000 UNITS: 5000 INJECTION, SOLUTION INTRAVENOUS; SUBCUTANEOUS at 02:04

## 2018-04-13 RX ADMIN — BUTALBITAL, ACETAMINOPHEN AND CAFFEINE 1 TABLET: 50; 325; 40 TABLET ORAL at 02:04

## 2018-04-13 RX ADMIN — HYDRALAZINE HYDROCHLORIDE 100 MG: 25 TABLET, FILM COATED ORAL at 06:04

## 2018-04-13 RX ADMIN — HYDRALAZINE HYDROCHLORIDE 10 MG: 20 INJECTION INTRAMUSCULAR; INTRAVENOUS at 04:04

## 2018-04-13 RX ADMIN — LATANOPROST 1 DROP: 50 SOLUTION OPHTHALMIC at 09:04

## 2018-04-13 RX ADMIN — ASPIRIN 81 MG: 81 TABLET, COATED ORAL at 06:04

## 2018-04-13 RX ADMIN — FUROSEMIDE 40 MG: 40 TABLET ORAL at 06:04

## 2018-04-13 RX ADMIN — SODIUM BICARBONATE 650 MG TABLET 650 MG: at 09:04

## 2018-04-13 RX ADMIN — HEPARIN SODIUM 5000 UNITS: 5000 INJECTION, SOLUTION INTRAVENOUS; SUBCUTANEOUS at 09:04

## 2018-04-13 RX ADMIN — LEVOTHYROXINE SODIUM 75 MCG: 75 TABLET ORAL at 06:04

## 2018-04-13 RX ADMIN — CARVEDILOL 25 MG: 12.5 TABLET, FILM COATED ORAL at 08:04

## 2018-04-13 RX ADMIN — BUTALBITAL, ACETAMINOPHEN AND CAFFEINE 1 TABLET: 50; 325; 40 TABLET ORAL at 08:04

## 2018-04-13 RX ADMIN — HYDROXYCHLOROQUINE SULFATE 200 MG: 200 TABLET, FILM COATED ORAL at 08:04

## 2018-04-13 RX ADMIN — ISOSORBIDE MONONITRATE 30 MG: 30 TABLET, EXTENDED RELEASE ORAL at 09:04

## 2018-04-13 RX ADMIN — ACETAMINOPHEN 650 MG: 325 TABLET ORAL at 06:04

## 2018-04-13 RX ADMIN — HYDRALAZINE HYDROCHLORIDE 100 MG: 25 TABLET, FILM COATED ORAL at 08:04

## 2018-04-13 RX ADMIN — HEPARIN SODIUM 5000 UNITS: 5000 INJECTION, SOLUTION INTRAVENOUS; SUBCUTANEOUS at 06:04

## 2018-04-13 RX ADMIN — CARVEDILOL 25 MG: 12.5 TABLET, FILM COATED ORAL at 06:04

## 2018-04-13 RX ADMIN — SODIUM BICARBONATE 650 MG TABLET 650 MG: at 08:04

## 2018-04-13 RX ADMIN — BUTALBITAL, ACETAMINOPHEN AND CAFFEINE 1 TABLET: 50; 325; 40 TABLET ORAL at 09:04

## 2018-04-13 RX ADMIN — HYDROXYCHLOROQUINE SULFATE 200 MG: 200 TABLET, FILM COATED ORAL at 06:04

## 2018-04-13 NOTE — PLAN OF CARE
SW met with pt at bedside to complete discharge assessment, verified PCP and uses [facility] on  [address].  Pt lives with grandson, 27 yo.  Pt has rolling walker and rollator at home.  DaughterMaria Esther will provide transportation home upon discharge.  No needs identified at this time by SW or pt.     04/13/18 1020   Discharge Assessment   Assessment Type Discharge Planning Assessment   Confirmed/corrected address and phone number on facesheet? Yes   Assessment information obtained from? Patient   Communicated expected length of stay with patient/caregiver no   Prior to hospitilization cognitive status: Alert/Oriented   Prior to hospitalization functional status: Independent   Current cognitive status: Alert/Oriented   Current Functional Status: Independent   Facility Arrived From: (Home)   Lives With alone   Able to Return to Prior Arrangements yes   Is patient able to care for self after discharge? Yes   Patient's perception of discharge disposition home or selfcare   Readmission Within The Last 30 Days previous discharge plan unsuccessful   Patient currently being followed by outpatient case management? No   Patient currently receives any other outside agency services? No   Equipment Currently Used at Home rollator;walker, rolling   Do you have any problems affording any of your prescribed medications? No   Is the patient taking medications as prescribed? yes   Does the patient have transportation home? Yes   Transportation Available family or friend will provide   Does the patient receive services at the [facility]? No   Discharge Plan A Home   Patient/Family In Agreement With Plan yes   Readmission Questionnaire   At the time of your discharge, did someone talk to you about what your health problems were? Yes   At the time of discharge, did someone talk to you about what to watch out for regarding worsening of your health problem? Yes   At the time of discharge, did someone talk to you about what to do  if you experienced worsening of your health problem? Yes   At the time of discharge, did someone talk to you about which medication to take when you left the hospital and which ones to stop taking? Yes   At the time of discharge, did someone talk to you about when and where to follow up with a doctor after you left the hospital? Yes   What do you believe caused you to be sick enough to be re-admitted? (short of breath)   How often do you need to have someone help you when you read instructions, pamphlets, or other written material from your doctor or pharmacy? Never   Do you have problems taking your medications as prescribed? No   Do you have any problems affording any of  your prescribed medications? No   Do you have problems obtaining/receiving your medications? No   Does the patient have transportation to healthcare appointments? Yes   Living Arrangements house   Are you currently feeling confused? No   Are you currently having problems thinking? No   Are you currently having memory problems? No   Have you felt down, depressed, or hopeless? 0   Have you felt little interest or pleasure in doing things? 0   In the last 7 days, my sleep quality was: good

## 2018-04-13 NOTE — ASSESSMENT & PLAN NOTE
- Flash pulmonary edema due to dietary indiscretion and subsequent hypertensive crisis  - Contributing factors of chronic diastolic heart failure and valvular heart disease.  - Patient compliant with home medications but BP has been difficult to control after multiple medication adjustments as outpatient.  - Continue diuresis and close blood pressure monitoring  - Nephrology following.

## 2018-04-13 NOTE — DISCHARGE INSTRUCTIONS
Pulmonary Edema  Your healthcare provider has told you that you have pulmonary edema. Read on to learn more about pulmonary edema and how it can be treated.  What is pulmonary edema?     Pulmonary edema is fluid in the air sacs (alveoli) in the lungs.   Pulmonary edema occurs when the air sacs (alveoli) in your lungs fill with fluid. The fluid buildup makes it hard for the lungs to do their job, including getting oxygen from the air you breathe. This can make it hard to breathe. The most common cause of pulmonary edema is heart failure. When the heart doesnt work properly, it can cause pressure to rise in the veins (blood vessels) of the lungs. As pressure builds, fluid leaks out of the congested veins. It fills the alveoli. The extra fluid prevents oxygen from moving through the lungs as it should. But heart failure isnt the only cause of pulmonary edema. Damage to the lungs or kidney failure can also cause fluid to fill the lungs. And in some cases, living or exercising at high altitudes can lead to fluid buildup in the lungs.  How is pulmonary edema diagnosed?  Your healthcare provider does an exam and asks about your health history. You may also have one or more of the following:  · Blood tests to take samples of blood.  · Imaging tests to take detailed pictures of inside the body. These may include a chest X-ray and ultrasound.  · Electrocardiography (ECG)  and echocardiogram to test how well the heart is functioning.  How is pulmonary edema treated?  Treatment usually depends on whats causing the edema. For instance, if its because of heart failure, treating the heart condition will treat the edema. Treatment can also ease symptoms. Therapy often includes the following:  · Oxygen. This may be given through a mask that goes over the nose. It may be given through a small tube that sits under the nose. Sometimes, pressurized air will be needed through a tight fitting mask, using a machine called CPAP or  BiPAP. Or it may be given through a tube placed into the windpipe (trachea). If a tube is required, a ventilator, often called a breathing machine or respirator will be used.  · Medicines. These may include water pills (diuretics) to help your body get rid of extra fluid. The fluid passes out of your body as urine. You may also need medicines to treat the heart. These can help your heart work better. This helps reduce fluid buildup in the lungs.  What are the long-term concerns?  If treated right away, pulmonary edema can be improved. It may even be cured. But in some cases, ongoing treatment is needed to help control the problem. This may require having procedures or taking medicines for months or years. In some cases, you may need to use oxygen or breathing equipment for a long time. This can lead to complications such as damage to lung tissue. Your healthcare provider can tell you more if needed.  Call 911  Call 911 if any of these occur:  · Chest pain  · Severe trouble breathing  · Coughing up blood  · Skin turns blue      When to call the healthcare provider  Call your the healthcare provider right away if you have any of the following:  · Unusual or irregular heartbeat  · Unable to speak full sentences before running out of breath  · Sweating more than usual   Date Last Reviewed: 2/1/2017  © 5123-9887 [employer]. [address] [place] [address], [place] [address]. All rights reserved. This information is not intended as a substitute for professional medical care. Always follow your healthcare professional's instructions.

## 2018-04-13 NOTE — PROGRESS NOTES
"Nephrology  Progress Note    Admit Date: 4/12/2018   LOS: 1 day     SUBJECTIVE:     Follow-up For:  Flash pulmonary edema/Acc HTN/MARYAM    Interval History:     Labile BP's.  C/o HA this am.  Renal fxn slightly improved.  No CP/SOB.  Discussed with Dr. Castro.     Review of Systems:  Constitutional: No fever or chills  Respiratory: No cough or shortness of breath  Cardiovascular: No chest pain or palpitations  Gastrointestinal: No nausea or vomiting  Neurological: No confusion or weakness    OBJECTIVE:     Vital Signs Range (Last 24H):  BP (!) 177/79 (BP Location: Left arm, Patient Position: Lying)   Pulse 69   Temp 98.2 °F (36.8 °C) (Oral)   Resp 18   Ht 5' 5" (1.651 m)   Wt 49.9 kg (110 lb)   LMP  (LMP Unknown)   SpO2 99%   Breastfeeding? No   BMI 18.30 kg/m²     Temp:  [97.8 °F (36.6 °C)-98.7 °F (37.1 °C)]   Pulse:  [60-78]   Resp:  [16-21]   BP: (109-232)/()   SpO2:  [94 %-100 %]     I & O (Last 24H):  Intake/Output Summary (Last 24 hours) at 04/13/18 1020  Last data filed at 04/13/18 0900   Gross per 24 hour   Intake              855 ml   Output              750 ml   Net              105 ml       Physical Exam:  General appearance: Well developed, well nourished  Eyes:  Conjunctivae/corneas clear. PERRL.  Lungs: Normal respiratory effort,   clear to auscultation bilaterally   Heart: Regular rate and rhythm, S1, S2 normal, no murmur, rub or ruma.  Abdomen: Soft, non-tender non-distended; bowel sounds normal; no masses,  no organomegaly  Extremities: No cyanosis or clubbing. No edema.    Skin: Skin color, texture, turgor normal. No rashes or lesions  Neurologic: Normal strength and tone. No focal numbness or weakness     Laboratory Data:  No results for input(s): WBC, RBC, HGB, HCT, PLT, MCV, MCH, MCHC in the last 24 hours.    BMP:   Recent Labs  Lab 04/13/18  0450   GLU 60*   *   K 4.6      CO2 21*   BUN 46*   CREATININE 3.2*   CALCIUM 8.5*   MG 1.6     Lab Results   Component Value " Date    CALCIUM 8.5 (L) 04/13/2018    PHOS 4.0 04/13/2018       Lab Results   Component Value Date    .0 (H) 11/21/2016    CALCIUM 8.5 (L) 04/13/2018    PHOS 4.0 04/13/2018       Lab Results   Component Value Date    URICACID 10.7 (H) 03/20/2018       BNP    Recent Labs  Lab 04/12/18  0753   BNP 4,002*       Medications:  Medication list was reviewed and changes noted under Assessment/Plan.    Diagnostic Results:        ASSESSMENT/PLAN:       Slowly improving nonoliguric MARYAM on CKD IV with uni-nephric hypertensive heart and acute on chronic diastolic HTN/ Acc HTN (N17.9, N18.4, I13.0, I50.33): Prior renal arterial doppler with some evidence of FRAN but repeat US (x2) reviewed by Dr. Lea does not show significant stenosis last month.  -Baseline creatinine around 3.0 and trends improving.   -Uni-nephric from renal donation  -Suspicious about primary hyperaldosteronism.  This is being worked up again by Dr. Brizuela as outpt.    -No ACE/ARB/Syd  -Renally dose meds, avoid nephrotoxins, and monitor I/O's closely.        Accelerated HTN likely due to excessive salt intake (I 13.0):  -Home monitor reviewed  -Admits to eating salty roast beef poboy  -Extremely labile blood pressure  -Restarted home medications with hold parameters  -no ACE/ARB.  -BB/CCB/lasix/Imdur.  Will follow up.   -flash Pulmonary edema likely from hypertensive crisis        SLE (M32.9):  Continue plaquenil.  Defer. Anti-histone negative.       Mild Metabolic Acidosis from Renal disease (E87.2):  -continue bicarb tabs as now.       See above  Home today/am if BP stable.

## 2018-04-13 NOTE — PLAN OF CARE
Problem: Patient Care Overview  Goal: Plan of Care Review  Outcome: Ongoing (interventions implemented as appropriate)  AAOX4. VSS.Pt free of trauma, falls, injury and skin breakdown. Pt complains of HA; Fioricet given PRN. Pt has been eating adequately throughout shift. Urine output low. 2L O2 NC applied PRN for SOB.Pt ambulates and repositions self independently. Cardiac monitoring maintained; alarms on and audible.Purposeful hourly rounding. Pt has call light in reach, side rails up X2, bed in low position and nonskid socks on. Pt lying in bed in no distress.

## 2018-04-13 NOTE — SUBJECTIVE & OBJECTIVE
Interval History:  Her BP has been markedly elevated over the last few house and did not respond to hydralazine given IV.  Morning meds given early.  She does not feel well and complains of headache and shortness of breath.    Review of Systems   Constitutional: Negative for chills and fever.   Respiratory: Positive for shortness of breath. Negative for cough.    Cardiovascular: Negative for chest pain and palpitations.   Neurological: Positive for headaches.     Objective:     Vital Signs (Most Recent):  Temp: 98.2 °F (36.8 °C) (04/13/18 0849)  Pulse: 69 (04/13/18 0849)  Resp: 18 (04/13/18 0849)  BP: (!) 177/79 (04/13/18 0849)  SpO2: 99 % (04/13/18 0849) Vital Signs (24h Range):  Temp:  [97.8 °F (36.6 °C)-98.7 °F (37.1 °C)] 98.2 °F (36.8 °C)  Pulse:  [58-78] 69  Resp:  [16-21] 18  SpO2:  [94 %-100 %] 99 %  BP: ()/() 177/79     Weight:  (49.9kg)  Body mass index is 18.3 kg/m².    Intake/Output Summary (Last 24 hours) at 04/13/18 0850  Last data filed at 04/13/18 0655   Gross per 24 hour   Intake              375 ml   Output              750 ml   Net             -375 ml      Physical Exam   Constitutional: She is oriented to person, place, and time. She appears well-developed and well-nourished.   Ill-appearing.   HENT:   Head: Normocephalic.   Eyes: Conjunctivae are normal. Pupils are equal, round, and reactive to light.   Neck: Neck supple. No thyromegaly present.   Cardiovascular: Normal rate, regular rhythm and intact distal pulses.  Exam reveals no gallop and no friction rub.    Murmur heard.  Pulmonary/Chest: Effort normal and breath sounds normal. She has no wheezes. She has no rales.   Abdominal: Soft. Bowel sounds are normal. She exhibits no distension. There is no tenderness.   Musculoskeletal: Normal range of motion. She exhibits no edema.   Lymphadenopathy:     She has no cervical adenopathy.   Neurological: She is alert and oriented to person, place, and time.   Strength equal and symmetric    Skin: Skin is warm and dry. No rash noted.   Psychiatric: She has a normal mood and affect. Her behavior is normal. Thought content normal.       Significant Labs:   BMP:   Recent Labs  Lab 04/13/18  0450   GLU 60*   *   K 4.6      CO2 21*   BUN 46*   CREATININE 3.2*   CALCIUM 8.5*   MG 1.6     CBC:   Recent Labs  Lab 04/12/18  0753   WBC 5.55   HGB 7.8*   HCT 24.1*          Significant Imaging: I have reviewed all pertinent imaging results/findings within the past 24 hours.

## 2018-04-13 NOTE — PROGRESS NOTES
[facility] Hospital Medicine  Progress Note    Patient Name: Ewelina Arnold  MRN: [id number]  Patient Class: IP- Inpatient   Admission Date: 4/12/2018  Length of Stay: 1 days  Attending Physician: Marily Castro MD  Primary Care Provider: Kalie Walton MD        Subjective:     Principal Problem:Flash pulmonary edema    HPI:  Ms. Arnold is a 72 year old woman discharged from here on 4/6/18 after a 5 day hospital stay for uncontrolled HTN.  She felt well initially, but returned to the ED this morning with worsening shortness of breath over the last two days.  She got up to use the bathroom at 3 AM and suddenly experienced a tightness in her chest with dyspnea.  On presentation here her BP was 210/95 with BNP 4000 and pulmonary edema on CXR.  She was given her home blood pressure medication and a NTG patch was applied, after which BP dropped abruptly to 81/46.    Medical history includes chronic heart failure with diastolic dysfunction, aortic stenosis and mitral regurgitation.  She has refractory hypertension thought due to renal artery stenosis and hyperaldosteronism.  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    Hospital Course:  Patient was admitted for treatment of severe hypertension and acute pulmonary edema.  She was seen by the nephrology service and was noted to have eaten a salty meal recently which likely caused the flash edema.  Her BP continued to be difficult to control after the initial drop in BP and elevations were associated with headaches.    Interval History:  Her BP has been markedly elevated over the last few house and did not respond to hydralazine given IV.  Morning meds given early.  She does not feel well and complains of headache and shortness of breath.    Review of Systems   Constitutional:  Negative for chills and fever.   Respiratory: Positive for shortness of breath. Negative for cough.    Cardiovascular: Negative for chest pain and palpitations.   Neurological: Positive for headaches.     Objective:     Vital Signs (Most Recent):  Temp: 98.2 °F (36.8 °C) (04/13/18 0849)  Pulse: 69 (04/13/18 0849)  Resp: 18 (04/13/18 0849)  BP: (!) 177/79 (04/13/18 0849)  SpO2: 99 % (04/13/18 0849) Vital Signs (24h Range):  Temp:  [97.8 °F (36.6 °C)-98.7 °F (37.1 °C)] 98.2 °F (36.8 °C)  Pulse:  [58-78] 69  Resp:  [16-21] 18  SpO2:  [94 %-100 %] 99 %  BP: ()/() 177/79     Weight:  (49.9kg)  Body mass index is 18.3 kg/m².    Intake/Output Summary (Last 24 hours) at 04/13/18 0850  Last data filed at 04/13/18 0655   Gross per 24 hour   Intake              375 ml   Output              750 ml   Net             -375 ml      Physical Exam   Constitutional: She is oriented to person, place, and time. She appears well-developed and well-nourished.   Ill-appearing.   HENT:   Head: Normocephalic.   Eyes: Conjunctivae are normal. Pupils are equal, round, and reactive to light.   Neck: Neck supple. No thyromegaly present.   Cardiovascular: Normal rate, regular rhythm and intact distal pulses.  Exam reveals no gallop and no friction rub.    Murmur heard.  Pulmonary/Chest: Effort normal and breath sounds normal. She has no wheezes. She has no rales.   Abdominal: Soft. Bowel sounds are normal. She exhibits no distension. There is no tenderness.   Musculoskeletal: Normal range of motion. She exhibits no edema.   Lymphadenopathy:     She has no cervical adenopathy.   Neurological: She is alert and oriented to person, place, and time.   Strength equal and symmetric   Skin: Skin is warm and dry. No rash noted.   Psychiatric: She has a normal mood and affect. Her behavior is normal. Thought content normal.       Significant Labs:   BMP:   Recent Labs  Lab 04/13/18  0450   GLU 60*   *   K 4.6      CO2 21*   BUN 46*    CREATININE 3.2*   CALCIUM 8.5*   MG 1.6     CBC:   Recent Labs  Lab 04/12/18  0753   WBC 5.55   HGB 7.8*   HCT 24.1*          Significant Imaging: I have reviewed all pertinent imaging results/findings within the past 24 hours.    Assessment/Plan:      * Flash pulmonary edema    - Flash pulmonary edema due to dietary indiscretion and subsequent hypertensive crisis  - Contributing factors of chronic diastolic heart failure and valvular heart disease.  - Patient compliant with home medications but BP has been difficult to control after multiple medication adjustments as outpatient.  - Continue diuresis and close blood pressure monitoring  - Nephrology following.              Hypertensive crisis    - As above  - Patient with renal artery stenosis and possible hyperaldosteronism, nephrology service following her as outpatient and continuing this workup.  - Continue close BP monitoring and diurese.          History of unilateral nephrectomy LEFT    - Single kidney after donation to her brother in 1985.  - As above          Hyperaldosteronism    - As above          Anemia associated with chronic renal failure    - At baseline          CKD (chronic kidney disease), stage IV    - Creatinine marginally worse than on discharge but BUN improved.  Suspect she is at new baseline.  - Solitary kidney status  - Followed by Dr. Brizuela          Left ventricular diastolic dysfunction with preserved systolic function    - Diastolic dysfunction and valvular heart disease with pulmonary hypertension on 2D echo from last week.  - Diurese            Other forms of systemic lupus erythematosus    - Followed by Dr. Ramirez at [facility] [place]  - On plaquenil            VTE Risk Mitigation         Ordered     heparin (porcine) injection 5,000 Units  Every 8 hours     Route:  Subcutaneous        04/12/18 1121     IP VTE HIGH RISK PATIENT  Once      04/12/18 1121              Marily Denise MD  Department of Hospital Medicine   [facility]

## 2018-04-13 NOTE — CARE UPDATE
Pt blood pressure uncontrolled despite attempt at PRN hydralazine 10mg.  Call placed to Dr Denise. Instructed to give am medications early. Micaela casey rn notified and is aware.

## 2018-04-13 NOTE — TELEPHONE ENCOUNTER
----- Message from Shima Herrera sent at 4/13/2018  6:04 AM CDT -----  Contact: self  Patient cancelled hospital follow up scheduled this morning.   Pt will call to reschedule.

## 2018-04-13 NOTE — PLAN OF CARE
Problem: Patient Care Overview  Goal: Plan of Care Review  Outcome: Ongoing (interventions implemented as appropriate)  Pt in no distress on 2L N[place], no changes at this time.

## 2018-04-13 NOTE — ASSESSMENT & PLAN NOTE
- As above  - Patient with renal artery stenosis and possible hyperaldosteronism, nephrology service following her as outpatient and continuing this workup.  - Continue close BP monitoring and diurese.

## 2018-04-14 LAB
ANION GAP SERPL CALC-SCNC: 11 MMOL/L
BUN SERPL-MCNC: 47 MG/DL
CALCIUM SERPL-MCNC: 8.6 MG/DL
CHLORIDE SERPL-SCNC: 105 MMOL/L
CO2 SERPL-SCNC: 21 MMOL/L
CREAT SERPL-MCNC: 3 MG/DL
EST. GFR  (AFRICAN AMERICAN): 17 ML/MIN/1.73 M^2
EST. GFR  (NON AFRICAN AMERICAN): 15 ML/MIN/1.73 M^2
GLUCOSE SERPL-MCNC: 75 MG/DL
MAGNESIUM SERPL-MCNC: 1.7 MG/DL
PHOSPHATE SERPL-MCNC: 4.6 MG/DL
POTASSIUM SERPL-SCNC: 4.6 MMOL/L
SODIUM SERPL-SCNC: 137 MMOL/L

## 2018-04-14 PROCEDURE — 11000001 HC ACUTE MED/SURG PRIVATE ROOM

## 2018-04-14 PROCEDURE — 25000003 PHARM REV CODE 250: Performed by: NURSE PRACTITIONER

## 2018-04-14 PROCEDURE — 63600175 PHARM REV CODE 636 W HCPCS: Performed by: HOSPITALIST

## 2018-04-14 PROCEDURE — 63600175 PHARM REV CODE 636 W HCPCS: Performed by: PHYSICIAN ASSISTANT

## 2018-04-14 PROCEDURE — 84100 ASSAY OF PHOSPHORUS: CPT

## 2018-04-14 PROCEDURE — 27000221 HC OXYGEN, UP TO 24 HOURS

## 2018-04-14 PROCEDURE — 94760 N-INVAS EAR/PLS OXIMETRY 1: CPT

## 2018-04-14 PROCEDURE — 36415 COLL VENOUS BLD VENIPUNCTURE: CPT

## 2018-04-14 PROCEDURE — 80048 BASIC METABOLIC PNL TOTAL CA: CPT

## 2018-04-14 PROCEDURE — 83735 ASSAY OF MAGNESIUM: CPT

## 2018-04-14 PROCEDURE — 25000003 PHARM REV CODE 250: Performed by: INTERNAL MEDICINE

## 2018-04-14 PROCEDURE — 25000003 PHARM REV CODE 250: Performed by: HOSPITALIST

## 2018-04-14 PROCEDURE — 99233 SBSQ HOSP IP/OBS HIGH 50: CPT | Mod: ,,, | Performed by: HOSPITALIST

## 2018-04-14 PROCEDURE — 63600175 PHARM REV CODE 636 W HCPCS: Performed by: NURSE PRACTITIONER

## 2018-04-14 RX ORDER — NIFEDIPINE 30 MG/1
90 TABLET, EXTENDED RELEASE ORAL DAILY
Status: DISCONTINUED | OUTPATIENT
Start: 2018-04-14 | End: 2018-04-15 | Stop reason: HOSPADM

## 2018-04-14 RX ORDER — MORPHINE SULFATE 2 MG/ML
1 INJECTION, SOLUTION INTRAMUSCULAR; INTRAVENOUS EVERY 6 HOURS PRN
Status: DISCONTINUED | OUTPATIENT
Start: 2018-04-14 | End: 2018-04-15 | Stop reason: HOSPADM

## 2018-04-14 RX ADMIN — CARVEDILOL 25 MG: 12.5 TABLET, FILM COATED ORAL at 08:04

## 2018-04-14 RX ADMIN — FUROSEMIDE 40 MG: 40 TABLET ORAL at 08:04

## 2018-04-14 RX ADMIN — HYDRALAZINE HYDROCHLORIDE 100 MG: 25 TABLET, FILM COATED ORAL at 09:04

## 2018-04-14 RX ADMIN — HYDROXYCHLOROQUINE SULFATE 200 MG: 200 TABLET, FILM COATED ORAL at 09:04

## 2018-04-14 RX ADMIN — SODIUM BICARBONATE 650 MG TABLET 650 MG: at 09:04

## 2018-04-14 RX ADMIN — HYDRALAZINE HYDROCHLORIDE 10 MG: 20 INJECTION INTRAMUSCULAR; INTRAVENOUS at 06:04

## 2018-04-14 RX ADMIN — BUTALBITAL, ACETAMINOPHEN AND CAFFEINE 1 TABLET: 50; 325; 40 TABLET ORAL at 06:04

## 2018-04-14 RX ADMIN — LATANOPROST 1 DROP: 50 SOLUTION OPHTHALMIC at 08:04

## 2018-04-14 RX ADMIN — MORPHINE SULFATE 1 MG: 2 INJECTION, SOLUTION INTRAMUSCULAR; INTRAVENOUS at 11:04

## 2018-04-14 RX ADMIN — NIFEDIPINE 90 MG: 30 TABLET, FILM COATED, EXTENDED RELEASE ORAL at 08:04

## 2018-04-14 RX ADMIN — LEVOTHYROXINE SODIUM 75 MCG: 75 TABLET ORAL at 05:04

## 2018-04-14 RX ADMIN — HEPARIN SODIUM 5000 UNITS: 5000 INJECTION, SOLUTION INTRAVENOUS; SUBCUTANEOUS at 05:04

## 2018-04-14 RX ADMIN — HYDRALAZINE HYDROCHLORIDE 100 MG: 25 TABLET, FILM COATED ORAL at 08:04

## 2018-04-14 RX ADMIN — ASPIRIN 81 MG: 81 TABLET, COATED ORAL at 08:04

## 2018-04-14 RX ADMIN — ROSUVASTATIN CALCIUM 10 MG: 10 TABLET, FILM COATED ORAL at 09:04

## 2018-04-14 RX ADMIN — ISOSORBIDE MONONITRATE 30 MG: 30 TABLET, EXTENDED RELEASE ORAL at 08:04

## 2018-04-14 RX ADMIN — CARVEDILOL 25 MG: 12.5 TABLET, FILM COATED ORAL at 09:04

## 2018-04-14 RX ADMIN — HEPARIN SODIUM 5000 UNITS: 5000 INJECTION, SOLUTION INTRAVENOUS; SUBCUTANEOUS at 01:04

## 2018-04-14 RX ADMIN — SODIUM BICARBONATE 650 MG TABLET 650 MG: at 08:04

## 2018-04-14 RX ADMIN — CITALOPRAM HYDROBROMIDE 30 MG: 20 TABLET ORAL at 08:04

## 2018-04-14 RX ADMIN — MORPHINE SULFATE 1 MG: 2 INJECTION, SOLUTION INTRAMUSCULAR; INTRAVENOUS at 05:04

## 2018-04-14 RX ADMIN — HYDROXYCHLOROQUINE SULFATE 200 MG: 200 TABLET, FILM COATED ORAL at 08:04

## 2018-04-14 NOTE — ASSESSMENT & PLAN NOTE
- Flash pulmonary edema due to dietary indiscretion and subsequent hypertensive crisis  - Contributing factors of chronic diastolic heart failure and valvular heart disease.  - Patient compliant with home medications but BP has been difficult to control after multiple medication adjustments as outpatient.  - Continue home medications with adjustments as noted - Imdur 30 mg daily added and nifedipine increased to 90 mg daily by nephrologist.  - Discharge home when BP consistently <170.

## 2018-04-14 NOTE — ASSESSMENT & PLAN NOTE
- As above  - Patient with renal artery stenosis and possible hyperaldosteronism, nephrology service following her as outpatient and continuing this workup.  - Continue close BP monitoring today.

## 2018-04-14 NOTE — PROGRESS NOTES
Renal Progress Note    Admit Date: 4/12/2018   LOS: 2 days     SUBJECTIVE:     Patient is without new complaint.    Scheduled Meds:   aspirin  81 mg Oral Daily    carvedilol  25 mg Oral BID    citalopram  30 mg Oral Daily    furosemide  40 mg Oral Daily    heparin (porcine)  5,000 Units Subcutaneous Q8H    hydrALAZINE  100 mg Oral Q12H    hydroxychloroquine  200 mg Oral BID    isosorbide mononitrate  30 mg Oral Daily    latanoprost  1 drop Both Eyes Daily    levothyroxine  75 mcg Oral Before breakfast    NIFEdipine  90 mg Oral Daily    rosuvastatin  10 mg Oral QHS    sodium bicarbonate  1 tablet Oral BID       OBJECTIVE:     Vital Signs Range (Last 24H):  Temp:  [97.1 °F (36.2 °C)-98.5 °F (36.9 °C)]   Pulse:  [62-80]   Resp:  [16-18]   BP: (135-215)/(61-99)   SpO2:  [94 %-100 %]     I & O (Last 24H):  Intake/Output Summary (Last 24 hours) at 04/14/18 0813  Last data filed at 04/14/18 0412   Gross per 24 hour   Intake             1200 ml   Output              850 ml   Net              350 ml       Physical Exam:  General appearance: Well developed, well nourished  Eyes:  Conjunctivae/corneas clear. PERRL.  Lungs: Normal respiratory effort,   clear to auscultation bilaterally   Heart: Regular rate and rhythm, S1, S2 normal, no murmur, rub or ruma.  Abdomen: Soft, non-tender non-distended; bowel sounds normal; no masses,  no organomegaly  Extremities: No cyanosis or clubbing. No edema.    Skin: Skin color, texture, turgor normal. No rashes or lesions  Neurologic: Normal strength and tone. No focal numbness or weakness          Laboratory:  CBC:   Recent Labs  Lab 04/12/18  0753   WBC 5.55   RBC 2.96*   HGB 7.8*   HCT 24.1*      MCV 81*   MCH 26.4*   MCHC 32.4     BMP:   Recent Labs  Lab 04/14/18  0546   GLU 75      K 4.6      CO2 21*   BUN 47*   CREATININE 3.0*   CALCIUM 8.6*   MG 1.7       ASSESSMENT/PLAN:       Resolved MARYAM on CKD IV with uni-nephric hypertensive heart and acute on  chronic diastolic HTN/ Acc HTN (N17.9, N18.4, I13.0, I50.33): Prior renal arterial doppler with some evidence of FRAN but repeat US (x2) reviewed by Dr. Lea does not show significant stenosis last month.  -Baseline creatinine around 3.0   -Uni-nephric from renal donation  -Suspicious about primary hyperaldosteronism.  This is being worked up again by Dr. Brizuela as outpt.    -No ACE/ARB/Syd  -Renally dose meds, avoid nephrotoxins, and monitor I/O's closely.      Accelerated HTN likely due to excessive salt intake (I 13.0):  -Nifedipine increased this am will see how BP's respond  -Extremely labile blood pressure  -no ACE/ARB.  -BB/CCB/lasix/Imdur.     -flash Pulmonary edema likely from hypertensive crisis     SLE (M32.9):  Continue plaquenil.  Defer. Anti-histone negative.     Mild Metabolic Acidosis from Renal disease (E87.2):  -continue bicarb tabs as now.      OK to D/C home when it's clear that her BP is less labile.  She MUST adhere to low salt diet.  She will need to follow up with Dr. Brizuela in 1-2 weeks.

## 2018-04-14 NOTE — ASSESSMENT & PLAN NOTE
- Creatinine improved since admission, but suspect she is at new baseline.  - Solitary kidney status  - Followed by Dr. Brizuela

## 2018-04-14 NOTE — PLAN OF CARE
Problem: Patient Care Overview  Goal: Plan of Care Review  Outcome: Ongoing (interventions implemented as appropriate)  Pt in no distress on 2L NC, no changes at this time. Will continue to monitor.

## 2018-04-14 NOTE — PROGRESS NOTES
[facility] Hospital Medicine  Progress Note    Patient Name: Ewelina Arnold  MRN: [id number]  Patient Class: IP- Inpatient   Admission Date: 4/12/2018  Length of Stay: 2 days  Attending Physician: Marily Castro MD  Primary Care Provider: Kalie Walton MD        Subjective:     Principal Problem:Flash pulmonary edema    HPI:  Ms. Arnold is a 72 year old woman discharged from here on 4/6/18 after a 5 day hospital stay for uncontrolled HTN.  She felt well initially, but returned to the ED this morning with worsening shortness of breath over the last two days.  She got up to use the bathroom at 3 AM and suddenly experienced a tightness in her chest with dyspnea.  On presentation here her BP was 210/95 with BNP 4000 and pulmonary edema on CXR.  She was given her home blood pressure medication and a NTG patch was applied, after which BP dropped abruptly to 81/46.    Medical history includes chronic heart failure with diastolic dysfunction, aortic stenosis and mitral regurgitation.  She has refractory hypertension thought due to renal artery stenosis and hyperaldosteronism.  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    Hospital Course:  Patient was admitted for treatment of severe hypertension and acute pulmonary edema.  She was seen by the nephrology service and was noted to have eaten a salty meal recently which likely caused the flash edema.  Her BP continued to be difficult to control after the initial drop in BP and elevations were associated with headaches.  Imdur 30 mg daily was added, and then her nifedipine was increased to 90 mg daily.    Interval History:  She still has headache, although was given morphine and it is resolving.  BP up this morning, 208 systolic.      Review of Systems   Constitutional: Negative for  chills and fever.   Respiratory: Negative for cough and shortness of breath.    Cardiovascular: Negative for chest pain and palpitations.   Neurological: Positive for headaches.     Objective:     Vital Signs (Most Recent):  Temp: 98.5 °F (36.9 °C) (04/14/18 0719)  Pulse: 67 (04/14/18 0800)  Resp: 18 (04/14/18 0719)  BP: (!) 208/95 (04/14/18 0719)  SpO2: 99 % (04/14/18 0719) Vital Signs (24h Range):  Temp:  [97.1 °F (36.2 °C)-98.5 °F (36.9 °C)] 98.5 °F (36.9 °C)  Pulse:  [62-80] 67  Resp:  [16-18] 18  SpO2:  [94 %-100 %] 99 %  BP: (135-215)/(61-99) 208/95     Weight:  (49.9kg)  Body mass index is 18.3 kg/m².    Intake/Output Summary (Last 24 hours) at 04/14/18 1017  Last data filed at 04/14/18 0412   Gross per 24 hour   Intake              720 ml   Output              850 ml   Net             -130 ml      Physical Exam   Constitutional: She is oriented to person, place, and time. She appears well-developed and well-nourished.   Ill-appearing.   HENT:   Head: Normocephalic.   Eyes: Conjunctivae are normal. Pupils are equal, round, and reactive to light.   Neck: Neck supple. No thyromegaly present.   Cardiovascular: Normal rate, regular rhythm and intact distal pulses.  Exam reveals no gallop and no friction rub.    Murmur heard.  Pulmonary/Chest: Effort normal and breath sounds normal. She has no wheezes. She has no rales.   Abdominal: Soft. Bowel sounds are normal. She exhibits no distension. There is no tenderness.   Musculoskeletal: Normal range of motion. She exhibits no edema.   Lymphadenopathy:     She has no cervical adenopathy.   Neurological: She is alert and oriented to person, place, and time.   Strength equal and symmetric   Skin: Skin is warm and dry. No rash noted.   Psychiatric: She has a normal mood and affect. Her behavior is normal. Thought content normal.       Significant Labs:   BMP:   Recent Labs  Lab 04/14/18  0546   GLU 75      K 4.6      CO2 21*   BUN 47*   CREATININE 3.0*    CALCIUM 8.6*   MG 1.7       Significant Imaging: I have reviewed all pertinent imaging results/findings within the past 24 hours.    Assessment/Plan:      * Flash pulmonary edema    - Flash pulmonary edema due to dietary indiscretion and subsequent hypertensive crisis  - Contributing factors of chronic diastolic heart failure and valvular heart disease.  - Patient compliant with home medications but BP has been difficult to control after multiple medication adjustments as outpatient.  - Continue home medications with adjustments as noted - Imdur 30 mg daily added and nifedipine increased to 90 mg daily by nephrologist.  - Discharge home when BP consistently <170.            Hypertensive crisis    - As above  - Patient with renal artery stenosis and possible hyperaldosteronism, nephrology service following her as outpatient and continuing this workup.  - Continue close BP monitoring today.          History of unilateral nephrectomy LEFT    - Single kidney after donation to her brother in 1985.  - As above          Hyperaldosteronism    - As above          Anemia associated with chronic renal failure    - At baseline          CKD (chronic kidney disease), stage IV    - Creatinine improved since admission, but suspect she is at new baseline.  - Solitary kidney status  - Followed by Dr. Brizuela          Left ventricular diastolic dysfunction with preserved systolic function    - Diastolic dysfunction and valvular heart disease with pulmonary hypertension on 2D echo from last week.  - Diurese            Other forms of systemic lupus erythematosus    - [place] [facility] [place]  - On plaquenil            VTE Risk Mitigation         Ordered     heparin (porcine) injection 5,000 Units  Every 8 hours     Route:  Subcutaneous        04/12/18 1121     IP VTE HIGH RISK PATIENT  Once      04/12/18 1121              Marily Denise MD  Department of Hospital Medicine   [facility]

## 2018-04-14 NOTE — SUBJECTIVE & OBJECTIVE
Interval History:  She still has headache, although was given morphine and it is resolving.  BP up this morning, 208 systolic.      Review of Systems   Constitutional: Negative for chills and fever.   Respiratory: Negative for cough and shortness of breath.    Cardiovascular: Negative for chest pain and palpitations.   Neurological: Positive for headaches.     Objective:     Vital Signs (Most Recent):  Temp: 98.5 °F (36.9 °C) (04/14/18 0719)  Pulse: 67 (04/14/18 0800)  Resp: 18 (04/14/18 0719)  BP: (!) 208/95 (04/14/18 0719)  SpO2: 99 % (04/14/18 0719) Vital Signs (24h Range):  Temp:  [97.1 °F (36.2 °C)-98.5 °F (36.9 °C)] 98.5 °F (36.9 °C)  Pulse:  [62-80] 67  Resp:  [16-18] 18  SpO2:  [94 %-100 %] 99 %  BP: (135-215)/(61-99) 208/95     Weight:  (49.9kg)  Body mass index is 18.3 kg/m².    Intake/Output Summary (Last 24 hours) at 04/14/18 1017  Last data filed at 04/14/18 0412   Gross per 24 hour   Intake              720 ml   Output              850 ml   Net             -130 ml      Physical Exam   Constitutional: She is oriented to person, place, and time. She appears well-developed and well-nourished.   Ill-appearing.   HENT:   Head: Normocephalic.   Eyes: Conjunctivae are normal. Pupils are equal, round, and reactive to light.   Neck: Neck supple. No thyromegaly present.   Cardiovascular: Normal rate, regular rhythm and intact distal pulses.  Exam reveals no gallop and no friction rub.    Murmur heard.  Pulmonary/Chest: Effort normal and breath sounds normal. She has no wheezes. She has no rales.   Abdominal: Soft. Bowel sounds are normal. She exhibits no distension. There is no tenderness.   Musculoskeletal: Normal range of motion. She exhibits no edema.   Lymphadenopathy:     She has no cervical adenopathy.   Neurological: She is alert and oriented to person, place, and time.   Strength equal and symmetric   Skin: Skin is warm and dry. No rash noted.   Psychiatric: She has a normal mood and affect. Her behavior  is normal. Thought content normal.       Significant Labs:   BMP:   Recent Labs  Lab 04/14/18  0546   GLU 75      K 4.6      CO2 21*   BUN 47*   CREATININE 3.0*   CALCIUM 8.6*   MG 1.7       Significant Imaging: I have reviewed all pertinent imaging results/findings within the past 24 hours.

## 2018-04-15 VITALS
DIASTOLIC BLOOD PRESSURE: 82 MMHG | BODY MASS INDEX: 18.33 KG/M2 | RESPIRATION RATE: 16 BRPM | TEMPERATURE: 98 F | HEART RATE: 66 BPM | OXYGEN SATURATION: 96 % | SYSTOLIC BLOOD PRESSURE: 174 MMHG | WEIGHT: 110 LBS | HEIGHT: 65 IN

## 2018-04-15 LAB
ANION GAP SERPL CALC-SCNC: 10 MMOL/L
BUN SERPL-MCNC: 46 MG/DL
CALCIUM SERPL-MCNC: 8.5 MG/DL
CHLORIDE SERPL-SCNC: 105 MMOL/L
CO2 SERPL-SCNC: 22 MMOL/L
CREAT SERPL-MCNC: 2.8 MG/DL
EST. GFR  (AFRICAN AMERICAN): 19 ML/MIN/1.73 M^2
EST. GFR  (NON AFRICAN AMERICAN): 16 ML/MIN/1.73 M^2
GLUCOSE SERPL-MCNC: 71 MG/DL
MAGNESIUM SERPL-MCNC: 1.7 MG/DL
PHOSPHATE SERPL-MCNC: 4.3 MG/DL
POTASSIUM SERPL-SCNC: 4.4 MMOL/L
SODIUM SERPL-SCNC: 137 MMOL/L

## 2018-04-15 PROCEDURE — 25000003 PHARM REV CODE 250: Performed by: INTERNAL MEDICINE

## 2018-04-15 PROCEDURE — 99900035 HC TECH TIME PER 15 MIN (STAT)

## 2018-04-15 PROCEDURE — 80048 BASIC METABOLIC PNL TOTAL CA: CPT

## 2018-04-15 PROCEDURE — 25000003 PHARM REV CODE 250: Performed by: NURSE PRACTITIONER

## 2018-04-15 PROCEDURE — 94761 N-INVAS EAR/PLS OXIMETRY MLT: CPT

## 2018-04-15 PROCEDURE — 83735 ASSAY OF MAGNESIUM: CPT

## 2018-04-15 PROCEDURE — 25000003 PHARM REV CODE 250: Performed by: HOSPITALIST

## 2018-04-15 PROCEDURE — 84100 ASSAY OF PHOSPHORUS: CPT

## 2018-04-15 PROCEDURE — 63600175 PHARM REV CODE 636 W HCPCS: Performed by: HOSPITALIST

## 2018-04-15 PROCEDURE — 36415 COLL VENOUS BLD VENIPUNCTURE: CPT

## 2018-04-15 PROCEDURE — 99238 HOSP IP/OBS DSCHRG MGMT 30/<: CPT | Mod: ,,, | Performed by: HOSPITALIST

## 2018-04-15 RX ORDER — NIFEDIPINE 90 MG/1
90 TABLET, EXTENDED RELEASE ORAL DAILY
Status: ON HOLD
Start: 2018-04-15 | End: 2018-05-03 | Stop reason: HOSPADM

## 2018-04-15 RX ORDER — ISOSORBIDE MONONITRATE 30 MG/1
30 TABLET, EXTENDED RELEASE ORAL DAILY
Qty: 90 TABLET | Refills: 0 | Status: ON HOLD | OUTPATIENT
Start: 2018-04-16 | End: 2018-05-03 | Stop reason: HOSPADM

## 2018-04-15 RX ADMIN — HYDRALAZINE HYDROCHLORIDE 100 MG: 25 TABLET, FILM COATED ORAL at 09:04

## 2018-04-15 RX ADMIN — NIFEDIPINE 90 MG: 30 TABLET, FILM COATED, EXTENDED RELEASE ORAL at 09:04

## 2018-04-15 RX ADMIN — ASPIRIN 81 MG: 81 TABLET, COATED ORAL at 09:04

## 2018-04-15 RX ADMIN — LATANOPROST 1 DROP: 50 SOLUTION OPHTHALMIC at 09:04

## 2018-04-15 RX ADMIN — FUROSEMIDE 40 MG: 40 TABLET ORAL at 09:04

## 2018-04-15 RX ADMIN — CITALOPRAM HYDROBROMIDE 30 MG: 20 TABLET ORAL at 09:04

## 2018-04-15 RX ADMIN — HEPARIN SODIUM 5000 UNITS: 5000 INJECTION, SOLUTION INTRAVENOUS; SUBCUTANEOUS at 06:04

## 2018-04-15 RX ADMIN — LEVOTHYROXINE SODIUM 75 MCG: 75 TABLET ORAL at 06:04

## 2018-04-15 RX ADMIN — SODIUM BICARBONATE 650 MG TABLET 650 MG: at 09:04

## 2018-04-15 RX ADMIN — HYDROXYCHLOROQUINE SULFATE 200 MG: 200 TABLET, FILM COATED ORAL at 09:04

## 2018-04-15 RX ADMIN — CARVEDILOL 25 MG: 12.5 TABLET, FILM COATED ORAL at 09:04

## 2018-04-15 RX ADMIN — ISOSORBIDE MONONITRATE 30 MG: 30 TABLET, EXTENDED RELEASE ORAL at 09:04

## 2018-04-15 NOTE — DISCHARGE SUMMARY
[facility] Hospital Medicine  Discharge Summary      Patient Name: Ewelina Arnold  MRN: [id number]  Admission Date: 4/12/2018  Hospital Length of Stay: 3 days  Discharge Date and Time: 4/15/2018 12:57 PM  Attending Physician: No att. providers found   Discharging Provider: Marily Denise MD  Primary Care Provider: Kalie Walton MD      HPI:   Ms. Arnold is a 72 year old woman discharged from here on 4/6/18 after a 5 day hospital stay for uncontrolled HTN.  She felt well initially, but returned to the ED this morning with worsening shortness of breath over the last two days.  She got up to use the bathroom at 3 AM and suddenly experienced a tightness in her chest with dyspnea.  On presentation here her BP was 210/95 with BNP 4000 and pulmonary edema on CXR.  She was given her home blood pressure medication and a NTG patch was applied, after which BP dropped abruptly to 81/46.    Medical history includes chronic heart failure with diastolic dysfunction, aortic stenosis and mitral regurgitation.  She has refractory hypertension thought due to renal artery stenosis and hyperaldosteronism.  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          Hospital Course:   Patient was admitted for treatment of severe hypertension and acute pulmonary edema.  She was seen by the nephrology service and was noted to have eaten a salty meal recently which likely caused the flash edema.  Her BP continued to be difficult to control after the initial drop in BP and elevations were associated with headaches.  Imdur 30 mg daily was added, and then her nifedipine was increased to 90 mg daily.  At discharge her BP was still labile and tended to be high in the morning prior to taking her morning meds.  Her headaches had resolved and she was feeling better in  general and without shortness of breath.     Consults:   Consults         Status Ordering Provider     Inpatient consult to [facility]  Once     Provider:  Joey Barnard NP    Completed KAUR PANTOJA            Final Active Diagnoses:    Diagnosis Date Noted POA    PRINCIPAL PROBLEM:  Flash pulmonary edema [J81.0] 04/12/2018 Yes    Hypertensive crisis [I16.9] 03/19/2018 Yes    History of unilateral nephrectomy LEFT [Z90.5] 04/03/2018 Not Applicable    Hyperaldosteronism [E26.9] 03/21/2018 Yes    Anemia associated with chronic renal failure [D63.1] 07/25/2017 Yes    CKD (chronic kidney disease), stage IV [N18.4] 01/23/2017 Yes    Left ventricular diastolic dysfunction with preserved systolic function [I51.9]  Yes    Other forms of systemic lupus erythematosus [M32.8] 08/17/2012 Yes     Chronic      Problems Resolved During this Admission:    Diagnosis Date Noted Date Resolved POA       Discharged Condition: stable    Disposition: Home or Self Care    Follow Up:  Follow-up Information     Kalie Walton MD.    Specialty:  Family Medicine  Why:  Follow up in 1-2 weeks  Contact information:  [address]  [address]  [facility] [address]  [phone]             Gustabo Brizuela MD.    Specialty:  Nephrology  Why:  Call Monday for an appointment this week.  Contact information:  [address] [place] [address]  [address]  [facility] NEPHROLOGY  [facility] [address]  [phone]                 Patient Instructions:     Ambulatory referral to Outpatient Case Management   Referral Priority: Routine Referral Type: Consultation   Referral Reason: Specialty Services Required    Number of Visits Requested: 1      Diet renal     Activity as tolerated       Medications:  Reconciled Home Medications:      Medication List      START taking these medications    isosorbide mononitrate 30 MG 24 hr tablet  Commonly known as:  IMDUR  Take 1 tablet (30 mg total) by mouth once daily.  Start taking on:   4/16/2018        CHANGE how you take these medications    NIFEdipine 90 MG (OSM) 24 hr tablet  Commonly known as:  PROCARDIA-XL  Take 1 tablet (90 mg total) by mouth once daily.  What changed:  · medication strength  · how much to take        CONTINUE taking these medications    aspirin 81 MG EC tablet  Commonly known as:  ECOTRIN  Take 1 tablet (81 mg total) by mouth once daily.     carvedilol 25 MG tablet  Commonly known as:  COREG  Take 1 tablet (25 mg total) by mouth 2 (two) times daily.     citalopram 20 MG tablet  Commonly known as:  CELEXA  Take 1.5 tablets (30 mg total) by mouth once daily.     famotidine 20 MG tablet  Commonly known as:  PEPCID  Take 1 tablet (20 mg total) by mouth once daily.     furosemide 40 MG tablet  Commonly known as:  LASIX  Take 1 tablet (40 mg total) by mouth once daily.     hydrALAZINE 100 MG tablet  Commonly known as:  APRESOLINE  Take 1 tablet (100 mg total) by mouth every 12 (twelve) hours.     hydroxychloroquine 200 mg tablet  Commonly known as:  PLAQUENIL  TAKE 1 TABLET TWICE DAILY     latanoprost 0.005 % ophthalmic solution  INSTILL 1 DROP INTO BOTH EYES EVERY DAY     levothyroxine 75 MCG tablet  Commonly known as:  SYNTHROID  Take 1 tablet (75 mcg total) by mouth before breakfast.     rosuvastatin 10 MG tablet  Commonly known as:  CRESTOR  Take 1 tablet (10 mg total) by mouth every evening.     sodium bicarbonate 650 MG tablet  Take 2 tablets (1,300 mg total) by mouth 2 (two) times daily.        STOP taking these medications    acetaminophen 325 MG tablet  Commonly known as:  TYLENOL            Time spent on the discharge of patient: <30 minutes  Patient was seen and examined on the date of discharge and determined to be suitable for discharge.         Marily Denise MD  Department of Hospital Medicine  [facility]

## 2018-04-15 NOTE — NURSING
Discharge instructions and pxs sent to pharmacy and verbalizes understanding.  Hep loc out and catheter intact.  No distress noted and ready for discharge.

## 2018-04-15 NOTE — PLAN OF CARE
Patient denies having any discharge needs at this time. Patient plans to contact daughter for a ride home after she's off work at 3 pm today.

## 2018-04-16 DIAGNOSIS — E87.20 METABOLIC ACIDOSIS, NORMAL ANION GAP (NAG): ICD-10-CM

## 2018-04-16 DIAGNOSIS — N18.9 ACUTE KIDNEY INJURY SUPERIMPOSED ON CHRONIC KIDNEY DISEASE: ICD-10-CM

## 2018-04-16 DIAGNOSIS — I15.0 RENOVASCULAR HYPERTENSION: ICD-10-CM

## 2018-04-16 DIAGNOSIS — N17.9 ACUTE KIDNEY INJURY SUPERIMPOSED ON CHRONIC KIDNEY DISEASE: ICD-10-CM

## 2018-04-16 DIAGNOSIS — E87.5 HYPERKALEMIA: ICD-10-CM

## 2018-04-16 RX ORDER — CARVEDILOL 25 MG/1
25 TABLET ORAL 2 TIMES DAILY
Qty: 180 TABLET | Refills: 1 | Status: SHIPPED | OUTPATIENT
Start: 2018-04-16 | End: 2018-07-30 | Stop reason: SDUPTHER

## 2018-04-16 RX ORDER — FAMOTIDINE 20 MG/1
20 TABLET, FILM COATED ORAL DAILY
Qty: 90 TABLET | Refills: 1 | Status: SHIPPED | OUTPATIENT
Start: 2018-04-16 | End: 2018-07-30 | Stop reason: SDUPTHER

## 2018-04-16 RX ORDER — HYDRALAZINE HYDROCHLORIDE 100 MG/1
100 TABLET, FILM COATED ORAL EVERY 12 HOURS
Qty: 180 TABLET | Refills: 1 | Status: SHIPPED | OUTPATIENT
Start: 2018-04-16 | End: 2018-07-09

## 2018-04-16 RX ORDER — FUROSEMIDE 40 MG/1
40 TABLET ORAL DAILY
Qty: 90 TABLET | Refills: 1 | Status: ON HOLD | OUTPATIENT
Start: 2018-04-16 | End: 2018-05-03 | Stop reason: HOSPADM

## 2018-04-16 NOTE — TELEPHONE ENCOUNTER
"----- Message from Nimo Gay sent at 4/16/2018 12:43 PM CDT -----  Contact: patient- [phone]  RX request - refill or new RX.  Is this a refill or new RX:  New rx  RX name and strength: furosemide (LASIX) 40 MG tablet   Directions:   Is this a 30 day or 90 day RX:  90 day  Pharmacy name and phone # (DON'T enter "on file" or "in chart"): [facility] [phone] (Phone)  [phone] (Fax)  Comments:  Patient was in the hospital and all of these Rx's are new and would like them when due to be sent to Pharmacy for refills through mail order. All 90 day supplies.      RX request - refill or new RX.  Is this a refill or new RX:  New rx  RX name and strength: carvedilol (COREG) 25 MG tablet      RX request - refill or new RX.  Is this a refill or new RX:  New Rx  RX name and strength: famotidine (PEPCID) 20 MG tablet     RX request - refill or new RX.  Is this a refill or new RX:  New rx  RX name and strength: hydrALAZINE (APRESOLINE) 100 MG tablet     RX request - refill or new RX.  Is this a refill or new RX:  New Rx  RX name and strength: sodium bicarbonate 650 MG tablet                        "

## 2018-04-16 NOTE — TELEPHONE ENCOUNTER
Patient notified Rx sent to pharmacy, patient advised that she would need to get the requested sodium bicarbibate 650mg OTC.  Patient verbalizes understanding

## 2018-04-17 ENCOUNTER — PATIENT OUTREACH (OUTPATIENT)
Dept: ADMINISTRATIVE | Facility: CLINIC | Age: 73
End: 2018-04-17

## 2018-04-17 NOTE — PHYSICIAN QUERY
"PT Name: Ewelina Arnold  MR #: [id number]     Physician Query Form - Documentation Clarification      CDS/: Ashley Villa               Contact information:[email or web address]    This form is a permanent document in the medical record.     Query Date: April 17, 2018    By submitting this query, we are merely seeking further clarification of documentation. Please utilize your independent clinical judgment when addressing the question(s) below.    The Medical record reflects the following:    Supporting Clinical Findings Location in Medical Record     Suspect complex acute on chronic chf w acute hypertensive urgency       ED Provider Notes 4/12   On presentation here her BP was 210/95 with BNP 4000 and pulmonary edema on CXR    Flash pulmonary edema due to dietary indiscretion and subsequent hypertensive crisis    Patient with possible hyperaldosteronism, nephrology service following her as outpatient and continuing this workup.        BP = 210/95, 230/105, 184/87           H&P 4/12                    V/S Flowsheet 4/12                                                                            Doctor, Please further specify the diagnosis of hypertensive crisis, if known.    Provider Use Only      [   ]  HTN Urgency    [  x ]  HTN crisis, unspecified    [   ]  Other explanations with details___________________________________                                                                                                               [  ] Clinically undetermined    These are old terms.  I was not aware we had gone back to using them.  Now I have to figure out the difference between urgency, malignancy, crisis, etc.  In the old days "urgency" became "accelerated" and meant any uncontrolled BP.  "Malignancy" implied eye findings.  "Crisis" is not a term I know.  If it refers to an event such as flash pulmonary edema then that is the correct term in this case.      "

## 2018-04-18 ENCOUNTER — TELEPHONE (OUTPATIENT)
Dept: FAMILY MEDICINE | Facility: CLINIC | Age: 73
End: 2018-04-18

## 2018-04-18 ENCOUNTER — OFFICE VISIT (OUTPATIENT)
Dept: OPHTHALMOLOGY | Facility: CLINIC | Age: 73
End: 2018-04-18
Payer: MEDICARE

## 2018-04-18 ENCOUNTER — OUTPATIENT CASE MANAGEMENT (OUTPATIENT)
Dept: ADMINISTRATIVE | Facility: OTHER | Age: 73
End: 2018-04-18

## 2018-04-18 DIAGNOSIS — Z79.899 HIGH RISK MEDICATION USE: ICD-10-CM

## 2018-04-18 DIAGNOSIS — H53.8 POSITIVE DYSPHOTOPSIA: ICD-10-CM

## 2018-04-18 DIAGNOSIS — H40.243 RESIDUAL STAGE OF ANGLE-CLOSURE GLAUCOMA OF BOTH EYES: Primary | ICD-10-CM

## 2018-04-18 DIAGNOSIS — H04.123 INSUFFICIENCY OF TEAR FILM OF BOTH EYES: ICD-10-CM

## 2018-04-18 DIAGNOSIS — M32.9 LUPUS (SYSTEMIC LUPUS ERYTHEMATOSUS): Chronic | ICD-10-CM

## 2018-04-18 DIAGNOSIS — H59.099 POSITIVE DYSPHOTOPSIA: ICD-10-CM

## 2018-04-18 DIAGNOSIS — H53.433 ARCUATE SCOTOMA OF BOTH EYES: ICD-10-CM

## 2018-04-18 DIAGNOSIS — H25.13 NUCLEAR SCLEROSIS OF BOTH EYES: ICD-10-CM

## 2018-04-18 PROCEDURE — 92134 CPTRZ OPH DX IMG PST SGM RTA: CPT | Mod: S$GLB,,, | Performed by: OPHTHALMOLOGY

## 2018-04-18 PROCEDURE — 99999 PR PBB SHADOW E&M-EST. PATIENT-LVL II: CPT | Mod: PBBFAC,,, | Performed by: OPHTHALMOLOGY

## 2018-04-18 PROCEDURE — 99499 UNLISTED E&M SERVICE: CPT | Mod: S$GLB,,, | Performed by: OPHTHALMOLOGY

## 2018-04-18 PROCEDURE — 92012 INTRM OPH EXAM EST PATIENT: CPT | Mod: S$GLB,,, | Performed by: OPHTHALMOLOGY

## 2018-04-18 NOTE — PROGRESS NOTES
Received referral for High Risk screen and discharge risk.  Verified pt identity and discussed outpatient case management services.  Advised on services offered and advantages and patient consented to these services. Patient verbalized knowledge that this is a free service and would last approximately 30 to 60 days.    71 yo female with history of hypertension with most recent diagnosis of flash pulmonary edema.  BNP was in the 4000 range.  Pt had indicated that she was noncompliant with sodium restriction the day prior.  Patient indicates that she lives in her home and her grandson stays with her occasionally but he and her daughter check on her often  Pt has follow up appt scheduled with dr. wray and dr. Brizuela nephrology [place]  Pt has already enrolled in the [employer] heart plus program and she utilizes the mail order and the OTC program.      Medications reconciliation patient asked that we complete this tomorrow      Nursing screen and assessment completed today telephonically.      Plan of care developed with pt input.  Short and long term goals reviewed with patient and patient verbalized understanding and agreement to these goals    Several interventions were completed today as noted  Contacted the [facility] department and had meals sent out post hospitalization  Message to dr. wray about digital hypertension program declined    Welcome statement and contact information as well as [place] information sent via pt portal/mail.  Pt advised to look in my [facility]/mail for communication    Plan  Complete medication reconciliation  Follow up on message to dr. roberts about digital hypertension program  Follow up on receipt of meals  Daily bp  Daily wt  Follow up on receipt of education material

## 2018-04-18 NOTE — PROGRESS NOTES
4/18/18  Call to pt for OPCM screening.  Pt is at opthalmology appointment and requested a call back later this evening

## 2018-04-18 NOTE — TELEPHONE ENCOUNTER
----- Message from Nimo Mercado RN sent at 4/18/2018  2:34 PM CDT -----  Dr. Flores,    I spoke with Mila with the digital program to reinstate ms clark and she indicated that the patient had declined to reinstate.  She indicates that the patient will not be seeing dr ferrara.  The nephrologist she will be seeing is a juan ramon Brizuela.  I feel there is benefit for her to maintain in this program.  I did not get to discuss this with pt as she was tired and our conversation was brief but I wanted to let you know and get your opinion.    Thanks  Nimo Mercado RN,[facility]  Outpatient Complex Case Management  [phone]

## 2018-04-19 ENCOUNTER — TELEPHONE (OUTPATIENT)
Dept: OPHTHALMOLOGY | Facility: CLINIC | Age: 73
End: 2018-04-19

## 2018-04-19 ENCOUNTER — OFFICE VISIT (OUTPATIENT)
Dept: FAMILY MEDICINE | Facility: CLINIC | Age: 73
End: 2018-04-19
Payer: MEDICARE

## 2018-04-19 ENCOUNTER — LAB VISIT (OUTPATIENT)
Dept: LAB | Facility: HOSPITAL | Age: 73
End: 2018-04-19
Attending: INTERNAL MEDICINE
Payer: MEDICARE

## 2018-04-19 VITALS
HEIGHT: 64 IN | SYSTOLIC BLOOD PRESSURE: 140 MMHG | BODY MASS INDEX: 20.67 KG/M2 | DIASTOLIC BLOOD PRESSURE: 60 MMHG | WEIGHT: 121.06 LBS | HEART RATE: 67 BPM | TEMPERATURE: 99 F

## 2018-04-19 DIAGNOSIS — I50.9 CONGESTIVE HEART FAILURE, UNSPECIFIED HF CHRONICITY, UNSPECIFIED HEART FAILURE TYPE: Primary | ICD-10-CM

## 2018-04-19 DIAGNOSIS — N18.4 CHRONIC KIDNEY DISEASE, STAGE IV (SEVERE): Primary | ICD-10-CM

## 2018-04-19 DIAGNOSIS — N19 RENAL FAILURE, UNSPECIFIED CHRONICITY: ICD-10-CM

## 2018-04-19 LAB
ALBUMIN SERPL BCP-MCNC: 2.7 G/DL
ANION GAP SERPL CALC-SCNC: 8 MMOL/L
BUN SERPL-MCNC: 52 MG/DL
CALCIUM SERPL-MCNC: 8.4 MG/DL
CHLORIDE SERPL-SCNC: 102 MMOL/L
CO2 SERPL-SCNC: 22 MMOL/L
CREAT SERPL-MCNC: 2.9 MG/DL
EST. GFR  (AFRICAN AMERICAN): 18 ML/MIN/1.73 M^2
EST. GFR  (NON AFRICAN AMERICAN): 15.6 ML/MIN/1.73 M^2
GLUCOSE SERPL-MCNC: 79 MG/DL
PHOSPHATE SERPL-MCNC: 3.9 MG/DL
POTASSIUM SERPL-SCNC: 4.7 MMOL/L
SODIUM SERPL-SCNC: 132 MMOL/L

## 2018-04-19 PROCEDURE — 99496 TRANSJ CARE MGMT HIGH F2F 7D: CPT | Mod: S$GLB,,, | Performed by: FAMILY MEDICINE

## 2018-04-19 PROCEDURE — 80069 RENAL FUNCTION PANEL: CPT

## 2018-04-19 PROCEDURE — 99499 UNLISTED E&M SERVICE: CPT | Mod: S$PBB,,, | Performed by: FAMILY MEDICINE

## 2018-04-19 PROCEDURE — 99999 PR PBB SHADOW E&M-EST. PATIENT-LVL III: CPT | Mod: PBBFAC,,, | Performed by: FAMILY MEDICINE

## 2018-04-19 PROCEDURE — 36415 COLL VENOUS BLD VENIPUNCTURE: CPT | Mod: PO

## 2018-04-19 NOTE — TELEPHONE ENCOUNTER
Pt is on plaquenil greater than 5 years. Dr. Mccray would like pt to see Dr. Sierra for a full retina exam.  Pt will call back to book apt.

## 2018-04-19 NOTE — TELEPHONE ENCOUNTER
----- Message from Lance Roberts sent at 4/19/2018 10:04 AM CDT -----  Contact: Ewelina  Ms. Arnold would like you contact her at [phone] concerning her plaquenil exam. She says that she didn't know anything about that appointment.

## 2018-04-20 NOTE — PROGRESS NOTES
Transitional Care Note  Subjective:       Patient ID: Ewelina Arnold is a 72 y.o. female.  Chief Complaint: Transitional Care and Shortness of Breath    Family and/or Caretaker present at visit?  No.  Diagnostic tests reviewed/disposition: I have reviewed all completed as well as pending diagnostic tests at the time of discharge.  Disease/illness education: RENAL INSUFFICIENCY/CHF   Home health/community services discussion/referrals: Patient does not have home health established from hospital visit.  They do not need home health.  If needed, we will set up home health for the patient.   Establishment or re-establishment of referral orders for community resources: No other necessary community resources.   Discussion with other health care providers: No discussion with other health care providers necessary.  She has appointment on Monday for follow-up with nephrology service saw her in the hospital  HPI see above  Review of Systems   Constitutional: Positive for fatigue and unexpected weight change.   HENT: Negative.    Eyes: Negative.    Respiratory: Negative.    Cardiovascular: Negative.    Gastrointestinal: Negative.    Endocrine: Negative.    Genitourinary: Negative.    Musculoskeletal: Negative.    Skin: Negative.    Allergic/Immunologic: Negative.    Neurological: Negative.    Hematological: Negative.    Psychiatric/Behavioral: Negative.        Objective:      Physical Exam   Constitutional: She is oriented to person, place, and time. She appears well-developed and well-nourished. No distress.   HENT:   Head: Normocephalic and atraumatic.   Right Ear: External ear normal.   Left Ear: External ear normal.   Eyes: Conjunctivae and EOM are normal. Pupils are equal, round, and reactive to light.   Neck: Normal range of motion. No JVD present.   Cardiovascular: Normal rate and regular rhythm.    Pulmonary/Chest: Effort normal and breath sounds normal.   Abdominal: Soft. Bowel sounds are normal.   Musculoskeletal:  Normal range of motion. She exhibits no edema or deformity.   Neurological: She is alert and oriented to person, place, and time. She displays normal reflexes. No cranial nerve deficit. She exhibits normal muscle tone. Coordination normal.   Skin: Skin is warm and dry. She is not diaphoretic.   Psychiatric: She has a normal mood and affect. Her behavior is normal. Judgment and thought content normal.   Nursing note and vitals reviewed.      Assessment:       Hospital follow-up appointment  Congestive heart failure  Renal insufficiency worsening  Labile hypertension  Plan:     see med card dated 4 19 2018  sodium bicarbonate 650 MG tablet 1,300 mg, 2 times daily         rosuvastatin (CRESTOR) 10 MG tablet 10 mg, Nightly        NIFEdipine (PROCARDIA-XL) 90 MG (OSM) 24 hr tablet 90 mg, Daily        levothyroxine (SYNTHROID) 75 MCG tablet 75 mcg, Before breakfast        latanoprost 0.005 % ophthalmic solution         isosorbide mononitrate (IMDUR) 30 MG 24 hr tablet 30 mg, Daily        hydroxychloroquine (PLAQUENIL) 200 mg tablet         hydrALAZINE (APRESOLINE) 100 MG tablet 100 mg, Every 12 hours        furosemide (LASIX) 40 MG tablet 40 mg, Daily        famotidine (PEPCID) 20 MG tablet 20 mg, Daily        ERGOCALCIFEROL, VITAMIN D2, (VITAMIN D ORAL)         citalopram (CELEXA) 20 MG tablet 30 mg, Daily        carvedilol (COREG) 25 MG tablet 25 mg, 2 times daily        aspirin (ECOTRIN) 81 MG EC tablet 81 mg, Daily         -Follow-up in one to 2 months for reevaluation

## 2018-04-23 ENCOUNTER — OUTPATIENT CASE MANAGEMENT (OUTPATIENT)
Dept: ADMINISTRATIVE | Facility: OTHER | Age: 73
End: 2018-04-23

## 2018-04-23 NOTE — PROGRESS NOTES
4/23/18  Summary:  Chart reviewed in epic.  Noted blood pressure 164/74 this am.  Pt states she is a little tired today.      Interventions  hbp  Received communication from dr. tin valiente and she indicated she would discuss the digital blood pressure program and appointment.  Reviewed noted in epic not decision indicated at this time.  Pt states she has appointment with the nephrolog[place] from [place] today.  She states he has access to her records to review all the testing that was done recently.  We will review his recommendations at next encounter.  Reviewed low soiium choices pt states she is trying to do better.  Reviewed foods to avoid such as deli meat, soups and processed foods.  Pt verbalized understanding.  chf  States minimal sob on exertion but no change  Did not weigh self today but indicates no change.      Plan  Chf  Complete medication reconciliation  Follow up on message to dr. roberts about digital hypertension program  Follow up on receipt of meals  HBP  Daily bp  Daily wt  Follow up on receipt of education material

## 2018-04-25 RX ORDER — ROSUVASTATIN CALCIUM 10 MG/1
10 TABLET, COATED ORAL NIGHTLY
Qty: 90 TABLET | Refills: 3 | Status: SHIPPED | OUTPATIENT
Start: 2018-04-25 | End: 2018-09-11 | Stop reason: SDUPTHER

## 2018-04-25 NOTE — TELEPHONE ENCOUNTER
----- Message from Selam Ferraro sent at 4/25/2018  8:58 AM CDT -----  Contact: [phone]  Pt called in regards to getting a Rx refill for rosuvastatin (CRESTOR) 10 MG tablet. She would like a call once it is done.      [facility] pharmacy  Please advise

## 2018-04-28 ENCOUNTER — HOSPITAL ENCOUNTER (INPATIENT)
Facility: OTHER | Age: 73
LOS: 5 days | Discharge: HOME HEALTH | DRG: 291 | End: 2018-05-03
Attending: EMERGENCY MEDICINE | Admitting: HOSPITALIST
Payer: MEDICARE

## 2018-04-28 DIAGNOSIS — N18.9 ACUTE KIDNEY INJURY SUPERIMPOSED ON CHRONIC KIDNEY DISEASE: ICD-10-CM

## 2018-04-28 DIAGNOSIS — N17.9 ACUTE KIDNEY INJURY SUPERIMPOSED ON CHRONIC KIDNEY DISEASE: ICD-10-CM

## 2018-04-28 DIAGNOSIS — I15.0 RENOVASCULAR HYPERTENSION: ICD-10-CM

## 2018-04-28 DIAGNOSIS — D64.9 ANEMIA, UNSPECIFIED TYPE: ICD-10-CM

## 2018-04-28 DIAGNOSIS — R06.02 SHORTNESS OF BREATH: ICD-10-CM

## 2018-04-28 DIAGNOSIS — R06.03 ACUTE RESPIRATORY DISTRESS: ICD-10-CM

## 2018-04-28 DIAGNOSIS — J81.0 FLASH PULMONARY EDEMA: ICD-10-CM

## 2018-04-28 DIAGNOSIS — N18.4 CKD (CHRONIC KIDNEY DISEASE), STAGE IV: ICD-10-CM

## 2018-04-28 DIAGNOSIS — I16.1 HYPERTENSIVE EMERGENCY: ICD-10-CM

## 2018-04-28 DIAGNOSIS — I1A.0 RESISTANT HYPERTENSION: ICD-10-CM

## 2018-04-28 DIAGNOSIS — I50.21 ACUTE SYSTOLIC CONGESTIVE HEART FAILURE: Primary | ICD-10-CM

## 2018-04-28 DIAGNOSIS — I50.31 ACUTE DIASTOLIC CONGESTIVE HEART FAILURE: ICD-10-CM

## 2018-04-28 DIAGNOSIS — R79.89 ELEVATED TROPONIN: ICD-10-CM

## 2018-04-28 PROBLEM — J96.01 ACUTE HYPOXEMIC RESPIRATORY FAILURE: Status: ACTIVE | Noted: 2018-04-28

## 2018-04-28 PROBLEM — I50.41 ACUTE COMBINED SYSTOLIC AND DIASTOLIC CONGESTIVE HEART FAILURE: Status: ACTIVE | Noted: 2018-04-28

## 2018-04-28 LAB
ALBUMIN SERPL BCP-MCNC: 3.1 G/DL
ALLENS TEST: ABNORMAL
ALP SERPL-CCNC: 95 U/L
ALT SERPL W/O P-5'-P-CCNC: 19 U/L
ANION GAP SERPL CALC-SCNC: 13 MMOL/L
AST SERPL-CCNC: 39 U/L
BASOPHILS # BLD AUTO: 0.03 K/UL
BASOPHILS NFR BLD: 0.6 %
BILIRUB SERPL-MCNC: 0.2 MG/DL
BNP SERPL-MCNC: 4697 PG/ML
BUN SERPL-MCNC: 55 MG/DL
CALCIUM SERPL-MCNC: 8.9 MG/DL
CHLORIDE SERPL-SCNC: 103 MMOL/L
CO2 SERPL-SCNC: 21 MMOL/L
CREAT SERPL-MCNC: 2.7 MG/DL
DELSYS: ABNORMAL
DIFFERENTIAL METHOD: ABNORMAL
EOSINOPHIL # BLD AUTO: 0 K/UL
EOSINOPHIL NFR BLD: 0 %
EP: 5
ERYTHROCYTE [DISTWIDTH] IN BLOOD BY AUTOMATED COUNT: 14.7 %
ERYTHROCYTE [SEDIMENTATION RATE] IN BLOOD BY WESTERGREN METHOD: 18 MM/H
EST. GFR  (AFRICAN AMERICAN): 20 ML/MIN/1.73 M^2
EST. GFR  (NON AFRICAN AMERICAN): 17 ML/MIN/1.73 M^2
FIO2: 50
GLUCOSE SERPL-MCNC: 95 MG/DL
HCO3 UR-SCNC: 25.8 MMOL/L (ref 24–28)
HCT VFR BLD AUTO: 27.1 %
HGB BLD-MCNC: 8.6 G/DL
INR PPP: 1
IP: 10
LYMPHOCYTES # BLD AUTO: 2 K/UL
LYMPHOCYTES NFR BLD: 37.3 %
MCH RBC QN AUTO: 26.7 PG
MCHC RBC AUTO-ENTMCNC: 31.7 G/DL
MCV RBC AUTO: 84 FL
MIN VOL: 10.6
MODE: ABNORMAL
MONOCYTES # BLD AUTO: 0.6 K/UL
MONOCYTES NFR BLD: 11.8 %
NEUTROPHILS # BLD AUTO: 2.7 K/UL
NEUTROPHILS NFR BLD: 50.3 %
PCO2 BLDA: 44.3 MMHG (ref 35–45)
PH SMN: 7.37 [PH] (ref 7.35–7.45)
PLATELET # BLD AUTO: 281 K/UL
PMV BLD AUTO: 9.3 FL
PO2 BLDA: 204 MMHG (ref 80–100)
POC BE: 1 MMOL/L
POC SATURATED O2: 100 % (ref 95–100)
POTASSIUM SERPL-SCNC: 4.4 MMOL/L
PROT SERPL-MCNC: 7.1 G/DL
PROTHROMBIN TIME: 11.4 SEC
RBC # BLD AUTO: 3.22 M/UL
SAMPLE: ABNORMAL
SITE: ABNORMAL
SODIUM SERPL-SCNC: 137 MMOL/L
SP02: 100
SPONT RATE: 29
TROPONIN I SERPL DL<=0.01 NG/ML-MCNC: 0.21 NG/ML
WBC # BLD AUTO: 5.41 K/UL

## 2018-04-28 PROCEDURE — 99291 CRITICAL CARE FIRST HOUR: CPT | Mod: 25

## 2018-04-28 PROCEDURE — 25000003 PHARM REV CODE 250: Performed by: EMERGENCY MEDICINE

## 2018-04-28 PROCEDURE — 82803 BLOOD GASES ANY COMBINATION: CPT

## 2018-04-28 PROCEDURE — 94660 CPAP INITIATION&MGMT: CPT

## 2018-04-28 PROCEDURE — 99900035 HC TECH TIME PER 15 MIN (STAT)

## 2018-04-28 PROCEDURE — 83880 ASSAY OF NATRIURETIC PEPTIDE: CPT

## 2018-04-28 PROCEDURE — 25000003 PHARM REV CODE 250: Performed by: HOSPITALIST

## 2018-04-28 PROCEDURE — 99223 1ST HOSP IP/OBS HIGH 75: CPT | Mod: ,,, | Performed by: HOSPITALIST

## 2018-04-28 PROCEDURE — 93010 ELECTROCARDIOGRAM REPORT: CPT | Mod: ,,, | Performed by: INTERNAL MEDICINE

## 2018-04-28 PROCEDURE — 80053 COMPREHEN METABOLIC PANEL: CPT

## 2018-04-28 PROCEDURE — 84484 ASSAY OF TROPONIN QUANT: CPT

## 2018-04-28 PROCEDURE — 27000221 HC OXYGEN, UP TO 24 HOURS

## 2018-04-28 PROCEDURE — 94644 CONT INHLJ TX 1ST HOUR: CPT

## 2018-04-28 PROCEDURE — 63600175 PHARM REV CODE 636 W HCPCS: Performed by: HOSPITALIST

## 2018-04-28 PROCEDURE — 96375 TX/PRO/DX INJ NEW DRUG ADDON: CPT

## 2018-04-28 PROCEDURE — 96374 THER/PROPH/DIAG INJ IV PUSH: CPT

## 2018-04-28 PROCEDURE — 85025 COMPLETE CBC W/AUTO DIFF WBC: CPT

## 2018-04-28 PROCEDURE — 85610 PROTHROMBIN TIME: CPT

## 2018-04-28 PROCEDURE — 25000242 PHARM REV CODE 250 ALT 637 W/ HCPCS: Performed by: EMERGENCY MEDICINE

## 2018-04-28 PROCEDURE — 63600175 PHARM REV CODE 636 W HCPCS: Performed by: EMERGENCY MEDICINE

## 2018-04-28 PROCEDURE — 36600 WITHDRAWAL OF ARTERIAL BLOOD: CPT

## 2018-04-28 PROCEDURE — 27000190 HC CPAP FULL FACE MASK W/VALVE

## 2018-04-28 PROCEDURE — 20000000 HC ICU ROOM

## 2018-04-28 PROCEDURE — 94761 N-INVAS EAR/PLS OXIMETRY MLT: CPT

## 2018-04-28 PROCEDURE — 63600175 PHARM REV CODE 636 W HCPCS: Performed by: INTERNAL MEDICINE

## 2018-04-28 PROCEDURE — 93005 ELECTROCARDIOGRAM TRACING: CPT

## 2018-04-28 RX ORDER — ACETAMINOPHEN 325 MG/1
650 TABLET ORAL EVERY 6 HOURS PRN
Status: DISCONTINUED | OUTPATIENT
Start: 2018-04-28 | End: 2018-05-03 | Stop reason: HOSPADM

## 2018-04-28 RX ORDER — ROSUVASTATIN CALCIUM 10 MG/1
10 TABLET, COATED ORAL NIGHTLY
Status: DISCONTINUED | OUTPATIENT
Start: 2018-04-28 | End: 2018-05-03 | Stop reason: HOSPADM

## 2018-04-28 RX ORDER — NIFEDIPINE 30 MG/1
90 TABLET, EXTENDED RELEASE ORAL DAILY
Status: DISCONTINUED | OUTPATIENT
Start: 2018-04-28 | End: 2018-04-30

## 2018-04-28 RX ORDER — HEPARIN SODIUM 5000 [USP'U]/ML
5000 INJECTION, SOLUTION INTRAVENOUS; SUBCUTANEOUS EVERY 8 HOURS
Status: DISCONTINUED | OUTPATIENT
Start: 2018-04-28 | End: 2018-04-30

## 2018-04-28 RX ORDER — FUROSEMIDE 10 MG/ML
40 INJECTION INTRAMUSCULAR; INTRAVENOUS
Status: COMPLETED | OUTPATIENT
Start: 2018-04-28 | End: 2018-04-28

## 2018-04-28 RX ORDER — RAMELTEON 8 MG/1
8 TABLET ORAL NIGHTLY PRN
Status: DISCONTINUED | OUTPATIENT
Start: 2018-04-28 | End: 2018-05-03 | Stop reason: HOSPADM

## 2018-04-28 RX ORDER — POLYETHYLENE GLYCOL 3350 17 G/17G
17 POWDER, FOR SOLUTION ORAL 2 TIMES DAILY PRN
Status: DISCONTINUED | OUTPATIENT
Start: 2018-04-28 | End: 2018-05-03 | Stop reason: HOSPADM

## 2018-04-28 RX ORDER — FUROSEMIDE 20 MG/1
40 TABLET ORAL DAILY
Status: DISCONTINUED | OUTPATIENT
Start: 2018-04-28 | End: 2018-04-28

## 2018-04-28 RX ORDER — FUROSEMIDE 10 MG/ML
40 INJECTION INTRAMUSCULAR; INTRAVENOUS 2 TIMES DAILY
Status: DISCONTINUED | OUTPATIENT
Start: 2018-04-28 | End: 2018-04-28

## 2018-04-28 RX ORDER — BUTALBITAL, ACETAMINOPHEN AND CAFFEINE 50; 325; 40 MG/1; MG/1; MG/1
1 TABLET ORAL EVERY 6 HOURS PRN
Status: DISCONTINUED | OUTPATIENT
Start: 2018-04-28 | End: 2018-05-03 | Stop reason: HOSPADM

## 2018-04-28 RX ORDER — FUROSEMIDE 10 MG/ML
40 INJECTION INTRAMUSCULAR; INTRAVENOUS 3 TIMES DAILY
Status: DISCONTINUED | OUTPATIENT
Start: 2018-04-28 | End: 2018-04-29

## 2018-04-28 RX ORDER — ALBUTEROL SULFATE 0.83 MG/ML
10 SOLUTION RESPIRATORY (INHALATION)
Status: COMPLETED | OUTPATIENT
Start: 2018-04-28 | End: 2018-04-28

## 2018-04-28 RX ORDER — ONDANSETRON 2 MG/ML
4 INJECTION INTRAMUSCULAR; INTRAVENOUS EVERY 8 HOURS PRN
Status: DISCONTINUED | OUTPATIENT
Start: 2018-04-28 | End: 2018-04-28 | Stop reason: SDUPTHER

## 2018-04-28 RX ORDER — SODIUM BICARBONATE 650 MG/1
1 TABLET ORAL 2 TIMES DAILY
Status: DISCONTINUED | OUTPATIENT
Start: 2018-04-28 | End: 2018-05-01

## 2018-04-28 RX ORDER — ISOSORBIDE MONONITRATE 30 MG/1
30 TABLET, EXTENDED RELEASE ORAL DAILY
Status: DISCONTINUED | OUTPATIENT
Start: 2018-04-28 | End: 2018-04-30

## 2018-04-28 RX ORDER — NIFEDIPINE 30 MG/1
90 TABLET, EXTENDED RELEASE ORAL
Status: COMPLETED | OUTPATIENT
Start: 2018-04-28 | End: 2018-04-28

## 2018-04-28 RX ORDER — ONDANSETRON 8 MG/1
8 TABLET, ORALLY DISINTEGRATING ORAL EVERY 8 HOURS PRN
Status: DISCONTINUED | OUTPATIENT
Start: 2018-04-28 | End: 2018-05-03 | Stop reason: HOSPADM

## 2018-04-28 RX ORDER — LATANOPROST 50 UG/ML
1 SOLUTION/ DROPS OPHTHALMIC DAILY
Status: DISCONTINUED | OUTPATIENT
Start: 2018-04-28 | End: 2018-05-03 | Stop reason: HOSPADM

## 2018-04-28 RX ORDER — MORPHINE SULFATE 2 MG/ML
1 INJECTION, SOLUTION INTRAMUSCULAR; INTRAVENOUS EVERY 6 HOURS PRN
Status: DISCONTINUED | OUTPATIENT
Start: 2018-04-28 | End: 2018-05-03 | Stop reason: HOSPADM

## 2018-04-28 RX ORDER — LEVOTHYROXINE SODIUM 75 UG/1
75 TABLET ORAL
Status: DISCONTINUED | OUTPATIENT
Start: 2018-04-29 | End: 2018-05-03 | Stop reason: HOSPADM

## 2018-04-28 RX ORDER — HYDRALAZINE HYDROCHLORIDE 25 MG/1
100 TABLET, FILM COATED ORAL EVERY 12 HOURS
Status: DISCONTINUED | OUTPATIENT
Start: 2018-04-28 | End: 2018-05-03 | Stop reason: HOSPADM

## 2018-04-28 RX ORDER — CARVEDILOL 12.5 MG/1
25 TABLET ORAL 2 TIMES DAILY
Status: DISCONTINUED | OUTPATIENT
Start: 2018-04-28 | End: 2018-05-03 | Stop reason: HOSPADM

## 2018-04-28 RX ORDER — HYDRALAZINE HYDROCHLORIDE 20 MG/ML
10 INJECTION INTRAMUSCULAR; INTRAVENOUS EVERY 8 HOURS PRN
Status: DISCONTINUED | OUTPATIENT
Start: 2018-04-28 | End: 2018-05-03 | Stop reason: HOSPADM

## 2018-04-28 RX ORDER — ASPIRIN 81 MG/1
81 TABLET ORAL DAILY
Status: DISCONTINUED | OUTPATIENT
Start: 2018-04-28 | End: 2018-05-03 | Stop reason: HOSPADM

## 2018-04-28 RX ORDER — HYDROXYCHLOROQUINE SULFATE 200 MG/1
200 TABLET, FILM COATED ORAL 2 TIMES DAILY
Status: DISCONTINUED | OUTPATIENT
Start: 2018-04-28 | End: 2018-05-03 | Stop reason: HOSPADM

## 2018-04-28 RX ORDER — HYDRALAZINE HYDROCHLORIDE 20 MG/ML
10 INJECTION INTRAMUSCULAR; INTRAVENOUS
Status: COMPLETED | OUTPATIENT
Start: 2018-04-28 | End: 2018-04-28

## 2018-04-28 RX ADMIN — HYDRALAZINE HYDROCHLORIDE 10 MG: 20 INJECTION INTRAMUSCULAR; INTRAVENOUS at 11:04

## 2018-04-28 RX ADMIN — ALBUTEROL SULFATE 10 MG: 2.5 SOLUTION RESPIRATORY (INHALATION) at 09:04

## 2018-04-28 RX ADMIN — SODIUM BICARBONATE 650 MG TABLET 650 MG: at 09:04

## 2018-04-28 RX ADMIN — CARVEDILOL 25 MG: 12.5 TABLET, FILM COATED ORAL at 01:04

## 2018-04-28 RX ADMIN — HYDRALAZINE HYDROCHLORIDE 100 MG: 25 TABLET, FILM COATED ORAL at 10:04

## 2018-04-28 RX ADMIN — ROSUVASTATIN CALCIUM 10 MG: 10 TABLET, FILM COATED ORAL at 09:04

## 2018-04-28 RX ADMIN — HYDROXYCHLOROQUINE SULFATE 200 MG: 200 TABLET, FILM COATED ORAL at 09:04

## 2018-04-28 RX ADMIN — HYDRALAZINE HYDROCHLORIDE 100 MG: 25 TABLET, FILM COATED ORAL at 01:04

## 2018-04-28 RX ADMIN — FUROSEMIDE 40 MG: 10 INJECTION, SOLUTION INTRAVENOUS at 05:04

## 2018-04-28 RX ADMIN — LATANOPROST 1 DROP: 50 SOLUTION OPHTHALMIC at 03:04

## 2018-04-28 RX ADMIN — HEPARIN SODIUM 5000 UNITS: 5000 INJECTION, SOLUTION INTRAVENOUS; SUBCUTANEOUS at 05:04

## 2018-04-28 RX ADMIN — CITALOPRAM HYDROBROMIDE 30 MG: 20 TABLET ORAL at 02:04

## 2018-04-28 RX ADMIN — SODIUM BICARBONATE 650 MG TABLET 650 MG: at 02:04

## 2018-04-28 RX ADMIN — HYDROXYCHLOROQUINE SULFATE 200 MG: 200 TABLET, FILM COATED ORAL at 02:04

## 2018-04-28 RX ADMIN — CARVEDILOL 25 MG: 12.5 TABLET, FILM COATED ORAL at 09:04

## 2018-04-28 RX ADMIN — NIFEDIPINE 90 MG: 30 TABLET, FILM COATED, EXTENDED RELEASE ORAL at 09:04

## 2018-04-28 RX ADMIN — ASPIRIN 81 MG: 81 TABLET, COATED ORAL at 01:04

## 2018-04-28 RX ADMIN — ISOSORBIDE MONONITRATE 30 MG: 30 TABLET, EXTENDED RELEASE ORAL at 01:04

## 2018-04-28 RX ADMIN — FUROSEMIDE 40 MG: 10 INJECTION, SOLUTION INTRAVENOUS at 09:04

## 2018-04-28 RX ADMIN — FUROSEMIDE 40 MG: 20 TABLET ORAL at 01:04

## 2018-04-28 NOTE — ED NOTES
No change in blood pressure . Dr gaspar informed. Pt resting quietly . Pt with c/o feeling cold. Warm  Blankets traded out.  Family at bedside.

## 2018-04-28 NOTE — PROGRESS NOTES
Pt received from ED on 2LNC with adequate saturation. Placed pt on BIPAP with documented settings due to pt stating that she feels a little SOB.  is aware. No changes made at this time. Will continue to monitor.

## 2018-04-28 NOTE — ED NOTES
Pt to er with c/o sob and chest pain x one day. Pt with increased sob on arriving to er . Pt tripoding . Dr kramer called to room . Pt on bnc 4 l.  With bilaterally wheezing and rales at bases abdomin soft none tender. No edema noted Peripherally. Skin warm and dry aaox3 . Pt very anxious.   decreased anxiousness after bipap placed.

## 2018-04-28 NOTE — PLAN OF CARE
Received patient on 3 LNC saturations 97% with reports of SOB, BBS coarse crackles Placed on BIPAP 10/5 50% ,ABG done ,and aerosol albuterol 10 mg started. Will continue therapy as ordered.

## 2018-04-28 NOTE — CONSULTS
"Consult Note  Nephrology    Consult Requested By: Loretta Anthony MD    Reason for Consult: MARYAM on CKD, HTN emergency    SUBJECTIVE:     History of Present Illness:   72 y.o. female with a history of CHF, HTN, HLD, and CAD who presents to the ED via EMS with yet another episode of hypertensive emergency, SOB complaint of chest pain. Per EMS, patient was initially complaining of "10/10 chest pain." Patient took two nitroglycerin at home prior to arrival of EMS, and was administered two more nitroglycerin en route. Per EMS, patient had a respiratory rate of 18, O2 sat between 97-99 %, and a blood pressure of 243/115.    Past Medical History:   Diagnosis Date    Acute hypoxemic respiratory failure 4/28/2018    Acute on chronic diastolic congestive heart failure 11/17/2017    Allergy     Anatomical narrow angle glaucoma     Anemia     States diagnosed about a month ago    Aortic atherosclerosis     Arthritis     Cataract     CHF (congestive heart failure)     Chronic kidney disease     Chronic sciatica of left side     Right lower back pain due to sciatica    Coronary artery disease     Depression     Dry eyes     GERD (gastroesophageal reflux disease)     History of colon cancer     Hyperaldosteronism     Hyperlipidemia     Hypertension     Hypothyroidism     Left ventricular diastolic dysfunction with preserved systolic function     Lupus (systemic lupus erythematosus) 8/17/2012    Malnutrition 5/16/2017    Osteoarthritis of cervical spine 8/17/2012    Osteopenia     PAD (peripheral artery disease)     FRAN (renal artery stenosis)      Past Surgical History:   Procedure Laterality Date    CARDIAC CATHETERIZATION  07/27/2011    x1    CHOLECYSTECTOMY  1999    COLON SURGERY  2000 & 2003    partial removal    COLONOSCOPY N/A 4/14/2016    Procedure: COLONOSCOPY;  Surgeon: Jose Steen MD;  Location: [address]);  Service: Endoscopy;  Laterality: N/A;  prep 2 days prior light meals " only/clear liquid day before  and 4 dulcolax tabs (5 mgs) at noon    COLONOSCOPY N/A 9/14/2017    Procedure: COLONOSCOPY;  Surgeon: Jose Steen MD;  Location: [facility] ([address] [place] [address]);  Service: Endoscopy;  Laterality: N/A;    EYE SURGERY      HAND SURGERY Bilateral 1996 & 1997    due to carpal tunnel     HERNIA REPAIR      HYSTERECTOMY  1983    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    OOPHORECTOMY      removed one    Peripheral Iridotomy both eyes  1994    laser angle correction    THYROIDECTOMY, PARTIAL  2006    to remove two nodules and one-half thyroid     Family History   Problem Relation Age of Onset    Heart attack Mother     Hypertension Mother     Heart disease Father     Hypertension Father     Diabetes Maternal Grandmother     Glaucoma Maternal Grandmother     Hypertension Sister     Kidney disease Brother     Kidney failure Brother      received donated kidney from sister    No Known Problems Daughter     Diabetes Son     Hypertension Brother     Diabetes Maternal Aunt     No Known Problems Maternal Grandfather     No Known Problems Paternal Grandmother     No Known Problems Paternal Grandfather     Acne Neg Hx     Eczema Neg Hx     Lupus Neg Hx     Psoriasis Neg Hx     Melanoma Neg Hx     Amblyopia Neg Hx     Blindness Neg Hx     Cataracts Neg Hx     Macular degeneration Neg Hx     Retinal detachment Neg Hx     Strabismus Neg Hx      Social History   Substance Use Topics    Smoking status: Former Smoker     Packs/day: 1.50     Years: 30.00     Types: Cigarettes     Start date: 1955     Quit date: 3/13/1985    Smokeless tobacco: Never Used    Alcohol use No       Review of patient's allergies indicates:   Allergen Reactions    Ace inhibitors Swelling     Lips Swelling    Vioxx [rofecoxib] Swelling      lips swelling    Bactrim [sulfamethoxazole-trimethoprim]      Pt would like this medication to be added due to elevation of creatinine with single kidney.     Clonidine Hallucinations     Hallucinations    Lactose     Arthrotec 50 [diclofenac-misoprostol]      Unknown    Bentyl [dicyclomine]      Not effective    Doxycycline Nausea Only    Lomotil [diphenoxylate-atropine] Nausea And Vomiting     Stomach cramps with vomitting    Lozol [indapamide] Rash    Norvasc [amlodipine]      Not tolerated    Tramadol Nausea Only      Body mass index is 18.3 kg/m².    Review of Systems:  Unable to perform ROS: Severe respiratory distress     OBJECTIVE:     Vital Signs Range (Last 24H):  Temp:  [97.5 °F (36.4 °C)]   Pulse:  [60-92]   Resp:  [23]   BP: (176-259)/()   SpO2:  [96 %-100 %]     Intake/Output Summary (Last 24 hours) at 04/28/18 1311  Last data filed at 04/28/18 1256   Gross per 24 hour   Intake                0 ml   Output              450 ml   Net             -450 ml       Physical Exam  Nursing note and vitals reviewed.  Constitutional: She appears well-developed and well-nourished. She appears distressed.   HENT:   Head: Normocephalic and atraumatic.   Eyes: Conjunctivae and EOM are normal.   Neck: Normal range of motion. Neck supple.   Cardiovascular: Normal rate, regular rhythm and normal heart sounds.   Pulmonary/Chest: She is in respiratory distress (acute respiratory distress). Bibasilar crackles.   Abdominal: Soft. There is no tenderness.   Musculoskeletal: Normal range of motion.   Skin: Skin is warm and dry.          Laboratory:      Recent Labs  Lab 04/28/18  0936      K 4.4      CO2 21*   BUN 55*   CREATININE 2.7*   CALCIUM 8.9     Lab Results   Component Value Date    .0 (H) 11/21/2016    CALCIUM 8.9 04/28/2018    PHOS 3.9 04/19/2018     Lab Results   Component Value Date    IRON 32 04/02/2018    TIBC 250 04/02/2018    FERRITIN 261 04/04/2018   BNP    Recent Labs  Lab 04/28/18  0936   BNP 4,697*       Diagnostic Results:    Reviewed most recent CT/US/Echo/MRI      ASSESSMENT/PLAN:     Labile nonoliguric MARYAM on CKD 3 with  uni-nephric hypertensive heart and acute on chronic diastolic HTN/ Acc HTN (N17.9, N18.3, I13.0, I50.33): Prior renal arterial doppler with some evidence of FRAN but repeat US (x2) reviewed by Dr. Lea does not show significant stenosis last month. Patient claims compliance with Meds and diet.  -Baseline creatinine around 1.75. 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  This is being worked up again by Dr. Brizuela  -No ACE/ARB/[place] for now  -Renally dose meds, avoid nephrotoxins, and monitor I/O's closely.  -should improve with BP control. I recommend cardeen drip goal sbp less than 170 for tonight.  -will give lasix IV BID until off of the BiPAP  -I think patient needs angiogram of renal arteries to prove that this repeated flash pulmonary edema is not from FRAN. Will re consult Dr. Lea for opinion.    Accelerated HTN  (I 13.0):  -Restart home medications with hold parameters once more stable  -flash Pulmonary edema likely from hypertensive crisis but what caused the crisis?     SLE (M32.9):  Continue plaquenil.  Defer. Anti-histone negative.      Thank you for allowing us to participate in the care of your patient. We will follow the patient and provide recommendations as needed.      Time spent seeing patient( greater than 1/2 spent in direct contact) :65min Critical secondary to   1. Acute systolic congestive heart failure    2. Shortness of breath    3. Flash pulmonary edema    4. Acute respiratory distress    5. Hypertensive emergency    6. MARYAM on CKD       Critical care was time spent personally by me on the following activities: development of treatment plan with patient or surrogate and bedside caregivers, discussions with consultants, evaluation of patient's response to treatment, examination of patient, ordering and performing treatments and interventions, ordering and review of laboratory studies,  ordering and review of radiographic studies, pulse oximetry, re-evaluation of patient's condition.  This critical care time did not overlap with that of any other provider or involve time for any procedures.

## 2018-04-28 NOTE — PROGRESS NOTES
Arrived to ICU on 2L NC. VSS. Pt stated that her chest felt tight similar to when she first arrived. Placed on BiPAP. States that her chest feels better with BiPAP on.

## 2018-04-28 NOTE — ED PROVIDER NOTES
"Encounter Date: 4/28/2018    SCRIBE #1 NOTE: Marky HOLLEY, jacinto scribing for, and in the presence of, Dr. Newman.       History     Chief Complaint   Patient presents with    Chest Pain     x4 hours. hx of CHF, took nitro at home     Shortness of Breath     Seen by provider: 9:16 AM    Patient is a 72 y.o. female with a history of CHF, HTN, HLD, and CAD who presents to the ED via EMS with complaint of chest pain and shortness of breath. Per EMS, patient was initially complaining of "10/10 chest pain." Patient took two nitroglycerin at home prior to arrival of EMS, and was administered two more nitroglycerin en route. Per EMS, patient had a respiratory rate of 18, O2 sat between 97-99 %, and a blood pressure of 243/115. Patient became to experience shortness of breath upon arrival to the ED. She has no known history of COPD, emphysema, or asthma.      The history is provided by the EMS personnel. The history is limited by the condition of the patient.     Review of patient's allergies indicates:   Allergen Reactions    Ace inhibitors Swelling     Lips Swelling    Vioxx [rofecoxib] Swelling      lips swelling    Bactrim [sulfamethoxazole-trimethoprim]      Pt would like this medication to be added due to elevation of creatinine with single kidney.    Clonidine Hallucinations     Hallucinations    Lactose     Arthrotec 50 [diclofenac-misoprostol]      Unknown    Bentyl [dicyclomine]      Not effective    Doxycycline Nausea Only    Lomotil [diphenoxylate-atropine] Nausea And Vomiting     Stomach cramps with vomitting    Lozol [indapamide] Rash    Norvasc [amlodipine]      Not tolerated    Tramadol Nausea Only     Past Medical History:   Diagnosis Date    Acute hypoxemic respiratory failure 4/28/2018    Acute on chronic diastolic congestive heart failure 11/17/2017    Allergy     Anatomical narrow angle glaucoma     Anemia     States diagnosed about a month ago    Aortic atherosclerosis     " Arthritis     Cataract     CHF (congestive heart failure)     Chronic kidney disease     Chronic sciatica of left side     Right lower back pain due to sciatica    Coronary artery disease     Depression     Dry eyes     GERD (gastroesophageal reflux disease)     History of colon cancer     Hyperaldosteronism     Hyperlipidemia     Hypertension     Hypothyroidism     Left ventricular diastolic dysfunction with preserved systolic function     Lupus (systemic lupus erythematosus) 8/17/2012    Malnutrition 5/16/2017    Osteoarthritis of cervical spine 8/17/2012    Osteopenia     PAD (peripheral artery disease)     FRAN (renal artery stenosis)      Past Surgical History:   Procedure Laterality Date    CARDIAC CATHETERIZATION  07/27/2011    x1    CHOLECYSTECTOMY  1999    COLON SURGERY  2000 & 2003    partial removal    COLONOSCOPY N/A 4/14/2016    Procedure: COLONOSCOPY;  Surgeon: Jose Steen MD;  Location: [facility] ENDO (4TH FLR);  Service: Endoscopy;  Laterality: N/A;  prep 2 days prior light meals only/clear liquid day before  and 4 dulcolax tabs (5 mgs) at noon    COLONOSCOPY N/A 9/14/2017    Procedure: COLONOSCOPY;  Surgeon: Jose Steen MD;  [facility]  Service: Endoscopy;  Laterality: N/A;    EYE SURGERY      HAND SURGERY Bilateral 1996 & 1997    due to carpal tunnel     HERNIA REPAIR      HYSTERECTOMY  1983    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    OOPHORECTOMY      removed one    Peripheral Iridotomy both eyes  1994    laser angle correction    THYROIDECTOMY, PARTIAL  2006    to remove two nodules and one-half thyroid     Family History   Problem Relation Age of Onset    Heart attack Mother     Hypertension Mother     Heart disease Father     Hypertension Father     Diabetes Maternal Grandmother     Glaucoma Maternal Grandmother     Hypertension Sister     Kidney disease Brother     Kidney failure Brother      received donated kidney from sister     No Known Problems Daughter     Diabetes Son     Hypertension Brother     Diabetes Maternal Aunt     No Known Problems Maternal Grandfather     No Known Problems Paternal Grandmother     No Known Problems Paternal Grandfather     Acne Neg Hx     Eczema Neg Hx     Lupus Neg Hx     Psoriasis Neg Hx     Melanoma Neg Hx     Amblyopia Neg Hx     Blindness Neg Hx     Cataracts Neg Hx     Macular degeneration Neg Hx     Retinal detachment Neg Hx     Strabismus Neg Hx      Social History   Substance Use Topics    Smoking status: Former Smoker     Packs/day: 1.50     Years: 30.00     Types: Cigarettes     Start date: 1955     Quit date: 3/13/1985    Smokeless tobacco: Never Used    Alcohol use No     Review of Systems   Unable to perform ROS: Severe respiratory distress       Physical Exam     Vitals:    04/28/18 1600 04/28/18 1615 04/28/18 1630 04/28/18 1700   BP: (!) 141/66  (!) 142/65 136/64   Pulse: 66 66 68 68   Resp: 16 15 (!) 28 15   Temp:       TempSrc:       SpO2: 98% 99% 99% 99%   Weight:       Height:        04/28/18 1800   BP: 135/64   Pulse: 72   Resp: 17   Temp:    TempSrc:    SpO2: 98%   Weight:    Height:       Physical Exam    Nursing note and vitals reviewed.  Constitutional: She appears well-developed and well-nourished. She appears distressed.   HENT:   Head: Normocephalic and atraumatic.   Eyes: Conjunctivae and EOM are normal.   Neck: Normal range of motion. Neck supple.   Cardiovascular: Normal rate, regular rhythm and normal heart sounds.   Pulmonary/Chest: She is in respiratory distress (acute respiratory distress). She has wheezes (inspiratory and expiratory wheezes bilaterally).   Bibasilar crackles.   Abdominal: Soft. There is no tenderness.   Musculoskeletal: Normal range of motion.   Neurological: She is alert and oriented to person, place, and time.   Skin: Skin is warm and dry.   Psychiatric: She has a normal mood and affect. Her behavior is normal. Judgment and thought  content normal.         ED Course   Critical Care  Date/Time: 4/28/2018 6:52 PM  Performed by: MARTIN LIPSCOMB  Authorized by: MARTIN LIPSCOMB   Direct patient critical care time: 60 minutes  Additional history critical care time: 10 minutes  Ordering / reviewing critical care time: 15 minutes  Documentation critical care time: 10 minutes  Consulting other physicians critical care time: 10 minutes  Total critical care time (exclusive of procedural time) : 105 minutes  Critical care time was exclusive of separately billable procedures and treating other patients.  Critical care was necessary to treat or prevent imminent or life-threatening deterioration of the following conditions: cardiac failure.  Critical care was time spent personally by me on the following activities: blood draw for specimens, development of treatment plan with patient or surrogate, discussions with consultants, discussions with primary provider, interpretation of cardiac output measurements, evaluation of patient's response to treatment, examination of patient, obtaining history from patient or surrogate, ordering and performing treatments and interventions, ordering and review of laboratory studies, ordering and review of radiographic studies, pulse oximetry, re-evaluation of patient's condition and review of old charts.        Labs Reviewed   CBC W/ AUTO DIFFERENTIAL - Abnormal; Notable for the following:        Result Value    RBC 3.22 (*)     Hemoglobin 8.6 (*)     Hematocrit 27.1 (*)     MCH 26.7 (*)     MCHC 31.7 (*)     RDW 14.7 (*)     All other components within normal limits   COMPREHENSIVE METABOLIC PANEL - Abnormal; Notable for the following:     CO2 21 (*)     BUN, Bld 55 (*)     Creatinine 2.7 (*)     Albumin 3.1 (*)     eGFR if  20 (*)     eGFR if non  17 (*)     All other components within normal limits   TROPONIN I - Abnormal; Notable for the following:     Troponin I 0.210 (*)     All  other components within normal limits   B-TYPE NATRIURETIC PEPTIDE - Abnormal; Notable for the following:     BNP 4,697 (*)     All other components within normal limits   ISTAT PROCEDURE - Abnormal; Notable for the following:     POC PO2 204 (*)     All other components within normal limits   PROTIME-INR     Imaging Results          X-Ray Chest AP Portable (Final result)  Result time 04/28/18 09:55:58    Final result by Jyotsna Beaver MD (04/28/18 09:55:58)                 Impression:      No adverse interval change.      Electronically signed by: Jyotsna Beaver MD  Date:    04/28/2018  Time:    09:55             Narrative:    EXAMINATION:  XR CHEST AP PORTABLE    CLINICAL HISTORY:  CHF;    TECHNIQUE:  Single frontal view of the chest was performed.    COMPARISON:  04/14/2018    FINDINGS:  As compared to the previous study of 04/14/2018, there has been no significant change in the cardiopulmonary findings.                                EKG Readings: (Independently Interpreted)   Sinus rhythm. Rate of 91. No STEMI.          Medical Decision Making:   Independently Interpreted Test(s):   I have ordered and independently interpreted EKG Reading(s) - see prior notes  Clinical Tests:   Lab Tests: Ordered and Reviewed  Radiological Study: Ordered and Reviewed  Medical Tests: Ordered and Reviewed  ED Management:  11:30 AM. Consulted and discussed with Dr. Vergara, who will admit the patient to Dr. Castro.             Scribe Attestation:   Scribe #1: I performed the above scribed service and the documentation accurately describes the services I performed. I attest to the accuracy of the note.    Attending Attestation:           Physician Attestation for Scribe:  Physician Attestation Statement for Scribe #1: I, Dr. Beaver, reviewed documentation, as scribed by Marky Marshall in my presence, and it is both accurate and complete.         Attending ED Notes:   Emergent evaluation of 72-year-old female with shortness  of breath with associated chest pain.  Patient is afebrile, hypertensive and in acute respiratory distress.  Patient is immediately placed on BiPAP and administered breathing treatments.  ABG reveals PO2 204.  Oxygen is decreased.  Patient is no elevation of white blood cell count.  H&H is 8.6 and 27.1.  BUN/creatinine are 55 and 2.7.  Albumin is 3.1.  Patient BNP is 4697.  Troponin is 0.210.  No acute findings on chest x-ray.  The patient is extensively counseled on her diagnosis and treatment including all diagnostic, laboratory and physical exam findings.  On reevaluation patient no longer in acute respiratory distress.  Blood pressure is controlled with IV blood pressure medications.  The patient is admitted in stable condition.              Clinical Impression:     1. Acute systolic congestive heart failure    2. Shortness of breath    3. Flash pulmonary edema    4. Acute respiratory distress    5. Hypertensive emergency    6. Anemia, unspecified type    7. Elevated troponin                                Jose Beaver MD  04/28/18 7839

## 2018-04-29 LAB
ALBUMIN SERPL BCP-MCNC: 2.6 G/DL
ANION GAP SERPL CALC-SCNC: 13 MMOL/L
BUN SERPL-MCNC: 60 MG/DL
CALCIUM SERPL-MCNC: 8.7 MG/DL
CHLORIDE SERPL-SCNC: 102 MMOL/L
CO2 SERPL-SCNC: 21 MMOL/L
CREAT SERPL-MCNC: 2.7 MG/DL
EST. GFR  (AFRICAN AMERICAN): 20 ML/MIN/1.73 M^2
EST. GFR  (NON AFRICAN AMERICAN): 17 ML/MIN/1.73 M^2
GLUCOSE SERPL-MCNC: 53 MG/DL
MAGNESIUM SERPL-MCNC: 1.8 MG/DL
PHOSPHATE SERPL-MCNC: 5.7 MG/DL
POCT GLUCOSE: 103 MG/DL (ref 70–110)
POTASSIUM SERPL-SCNC: 4.3 MMOL/L
SODIUM SERPL-SCNC: 136 MMOL/L

## 2018-04-29 PROCEDURE — 63600175 PHARM REV CODE 636 W HCPCS: Performed by: HOSPITALIST

## 2018-04-29 PROCEDURE — 94761 N-INVAS EAR/PLS OXIMETRY MLT: CPT

## 2018-04-29 PROCEDURE — 83735 ASSAY OF MAGNESIUM: CPT

## 2018-04-29 PROCEDURE — 20000000 HC ICU ROOM

## 2018-04-29 PROCEDURE — 36415 COLL VENOUS BLD VENIPUNCTURE: CPT

## 2018-04-29 PROCEDURE — 27000221 HC OXYGEN, UP TO 24 HOURS

## 2018-04-29 PROCEDURE — 80069 RENAL FUNCTION PANEL: CPT

## 2018-04-29 PROCEDURE — 94660 CPAP INITIATION&MGMT: CPT

## 2018-04-29 PROCEDURE — 99900035 HC TECH TIME PER 15 MIN (STAT)

## 2018-04-29 PROCEDURE — 25000003 PHARM REV CODE 250: Performed by: HOSPITALIST

## 2018-04-29 PROCEDURE — 99233 SBSQ HOSP IP/OBS HIGH 50: CPT | Mod: ,,, | Performed by: HOSPITALIST

## 2018-04-29 RX ORDER — FUROSEMIDE 10 MG/ML
40 INJECTION INTRAMUSCULAR; INTRAVENOUS 2 TIMES DAILY
Status: DISCONTINUED | OUTPATIENT
Start: 2018-04-29 | End: 2018-04-30

## 2018-04-29 RX ADMIN — ASPIRIN 81 MG: 81 TABLET, COATED ORAL at 09:04

## 2018-04-29 RX ADMIN — CITALOPRAM HYDROBROMIDE 30 MG: 20 TABLET ORAL at 09:04

## 2018-04-29 RX ADMIN — ISOSORBIDE MONONITRATE 30 MG: 30 TABLET, EXTENDED RELEASE ORAL at 09:04

## 2018-04-29 RX ADMIN — NIFEDIPINE 90 MG: 30 TABLET, FILM COATED, EXTENDED RELEASE ORAL at 09:04

## 2018-04-29 RX ADMIN — FUROSEMIDE 40 MG: 10 INJECTION, SOLUTION INTRAVENOUS at 09:04

## 2018-04-29 RX ADMIN — HYDRALAZINE HYDROCHLORIDE 100 MG: 25 TABLET, FILM COATED ORAL at 09:04

## 2018-04-29 RX ADMIN — MORPHINE SULFATE 1 MG: 2 INJECTION, SOLUTION INTRAMUSCULAR; INTRAVENOUS at 04:04

## 2018-04-29 RX ADMIN — LATANOPROST 1 DROP: 50 SOLUTION OPHTHALMIC at 09:04

## 2018-04-29 RX ADMIN — SODIUM BICARBONATE 650 MG TABLET 650 MG: at 08:04

## 2018-04-29 RX ADMIN — SODIUM BICARBONATE 650 MG TABLET 650 MG: at 09:04

## 2018-04-29 RX ADMIN — HEPARIN SODIUM 5000 UNITS: 5000 INJECTION, SOLUTION INTRAVENOUS; SUBCUTANEOUS at 09:04

## 2018-04-29 RX ADMIN — CARVEDILOL 25 MG: 12.5 TABLET, FILM COATED ORAL at 09:04

## 2018-04-29 RX ADMIN — BUTALBITAL, ACETAMINOPHEN AND CAFFEINE 1 TABLET: 50; 325; 40 TABLET ORAL at 01:04

## 2018-04-29 RX ADMIN — HYDRALAZINE HYDROCHLORIDE 100 MG: 25 TABLET, FILM COATED ORAL at 08:04

## 2018-04-29 RX ADMIN — HEPARIN SODIUM 5000 UNITS: 5000 INJECTION, SOLUTION INTRAVENOUS; SUBCUTANEOUS at 06:04

## 2018-04-29 RX ADMIN — CARVEDILOL 25 MG: 12.5 TABLET, FILM COATED ORAL at 08:04

## 2018-04-29 RX ADMIN — HEPARIN SODIUM 5000 UNITS: 5000 INJECTION, SOLUTION INTRAVENOUS; SUBCUTANEOUS at 02:04

## 2018-04-29 RX ADMIN — HYDRALAZINE HYDROCHLORIDE 10 MG: 20 INJECTION INTRAMUSCULAR; INTRAVENOUS at 06:04

## 2018-04-29 RX ADMIN — HYDROXYCHLOROQUINE SULFATE 200 MG: 200 TABLET, FILM COATED ORAL at 08:04

## 2018-04-29 RX ADMIN — FUROSEMIDE 40 MG: 10 INJECTION, SOLUTION INTRAVENOUS at 05:04

## 2018-04-29 RX ADMIN — HYDRALAZINE HYDROCHLORIDE 10 MG: 20 INJECTION INTRAMUSCULAR; INTRAVENOUS at 10:04

## 2018-04-29 RX ADMIN — LEVOTHYROXINE SODIUM 75 MCG: 75 TABLET ORAL at 06:04

## 2018-04-29 RX ADMIN — ROSUVASTATIN CALCIUM 10 MG: 10 TABLET, FILM COATED ORAL at 08:04

## 2018-04-29 RX ADMIN — HYDROXYCHLOROQUINE SULFATE 200 MG: 200 TABLET, FILM COATED ORAL at 09:04

## 2018-04-29 NOTE — H&P
[facility] Medicine  History & Physical    Patient Name: Ewelina Arnold  MRN: [id number]  Admission Date: 4/28/2018  Attending Physician: Loretta Anthony MD  Primary Care Provider: Kalie Walton MD         Patient information was obtained from past medical records and ER records.     Subjective:     Principal Problem:Acute combined systolic and diastolic congestive heart failure    Chief Complaint:   Chief Complaint   Patient presents with    Shortness of Breath        HPI: This is a 72 year old female with suspected renal artery stenosis, hyperaldosteronism, CHF, HTN, CKD, stage IV and frequent hospitalizations for suspected flash pulmonary edema secondary to hypertensive emergencies presents with complaints of shortness of breath, difficulty breathing, and chest pain.  Upon arrival to the ED, the patient had a elevated BNP of 4697 and troponin I 0.21.  Systolic BP was up to the 230s systolic.      Past Medical History:   Diagnosis Date    Acute hypoxemic respiratory failure 4/28/2018    Acute on chronic diastolic congestive heart failure 11/17/2017    Allergy     Anatomical narrow angle glaucoma     Anemia     States diagnosed about a month ago    Aortic atherosclerosis     Arthritis     Cataract     CHF (congestive heart failure)     Chronic kidney disease     Chronic sciatica of left side     Right lower back pain due to sciatica    Coronary artery disease     Depression     Dry eyes     GERD (gastroesophageal reflux disease)     History of colon cancer     Hyperaldosteronism     Hyperlipidemia     Hypertension     Hypothyroidism     Left ventricular diastolic dysfunction with preserved systolic function     Lupus (systemic lupus erythematosus) 8/17/2012    Malnutrition 5/16/2017    Osteoarthritis of cervical spine 8/17/2012    Osteopenia     PAD (peripheral artery disease)     FRAN (renal artery stenosis)        Past Surgical History:   Procedure  Laterality Date    CARDIAC CATHETERIZATION  07/27/2011    x1    CHOLECYSTECTOMY  1999    COLON SURGERY  2000 & 2003    partial removal    COLONOSCOPY N/A 4/14/2016    Procedure: COLONOSCOPY;  Surgeon: Jose Steen MD;  [facility]  Service: Endoscopy;  Laterality: N/A;  prep 2 days prior light meals only/clear liquid day before  and 4 dulcolax tabs (5 mgs) at noon    COLONOSCOPY N/A 9/14/2017    Procedure: COLONOSCOPY;  Surgeon: Jose Steen MD;  [facility]  Service: Endoscopy;  Laterality: N/A;    EYE SURGERY      HAND SURGERY Bilateral 1996 & 1997    due to carpal tunnel     HERNIA REPAIR      HYSTERECTOMY  1983    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    Clonidine Hallucinations     Hallucinations    Lactose     Arthrotec 50 [diclofenac-misoprostol]      Unknown    Bentyl [dicyclomine]      Not effective    Doxycycline Nausea Only    Lomotil [diphenoxylate-atropine] Nausea And Vomiting     Stomach cramps with vomitting    Lozol [indapamide] Rash    Norvasc [amlodipine]      Not tolerated    Tramadol Nausea Only       No current facility-administered medications on file prior to encounter.      Current Outpatient Prescriptions on File Prior to Encounter   Medication Sig    aspirin (ECOTRIN) 81 MG EC tablet Take 1 tablet (81 mg total) by mouth once daily.    carvedilol (COREG) 25 MG tablet Take 1 tablet (25 mg total) by mouth 2 (two) times daily.    citalopram (CELEXA) 20  MG tablet Take 1.5 tablets (30 mg total) by mouth once daily.    ERGOCALCIFEROL, VITAMIN D2, (VITAMIN D ORAL) Take by mouth.    famotidine (PEPCID) 20 MG tablet Take 1 tablet (20 mg total) by mouth once daily.    furosemide (LASIX) 40 MG tablet Take 1 tablet (40 mg total) by mouth once daily.    hydrALAZINE (APRESOLINE) 100 MG tablet Take 1 tablet (100 mg total) by mouth every 12 (twelve) hours.    hydroxychloroquine (PLAQUENIL) 200 mg tablet TAKE 1 TABLET TWICE DAILY    isosorbide mononitrate (IMDUR) 30 MG 24 hr tablet Take 1 tablet (30 mg total) by mouth once daily.    latanoprost 0.005 % ophthalmic solution INSTILL 1 DROP INTO BOTH EYES EVERY DAY    levothyroxine (SYNTHROID) 75 MCG tablet Take 1 tablet (75 mcg total) by mouth before breakfast.    NIFEdipine (PROCARDIA-XL) 90 MG (OSM) 24 hr tablet Take 1 tablet (90 mg total) by mouth once daily.    rosuvastatin (CRESTOR) 10 MG tablet Take 1 tablet (10 mg total) by mouth every evening.    sodium bicarbonate 650 MG tablet Take 2 tablets (1,300 mg total) by mouth 2 (two) times daily.     Family History     Problem Relation (Age of Onset)    Diabetes Maternal Grandmother, Son, Maternal Aunt    Glaucoma Maternal Grandmother    Heart attack Mother    Heart disease Father    Hypertension Mother, Father, Sister, Brother    Kidney disease Brother    Kidney failure Brother    No Known Problems Daughter, Maternal Grandfather, Paternal Grandmother, Paternal Grandfather        Social History Main Topics    Smoking status: Former Smoker     Packs/day: 1.50     Years: 30.00     Types: Cigarettes     Start date: 1955     Quit date: 3/13/1985    Smokeless tobacco: Never Used    Alcohol use No    Drug use: No    Sexual activity: Not Currently     Partners: Male     Birth control/ protection: Abstinence     Review of Systems   Unable to perform ROS: Acuity of condition     Objective:     Vital Signs (Most Recent):  Temp: 98.3 °F (36.8 °C) (04/28/18 1903)  Pulse: 72  (04/28/18 1903)  Resp: (!) 22 (04/28/18 1903)  BP: (!) 144/66 (04/28/18 1903)  SpO2: 98 % (04/28/18 1903) Vital Signs (24h Range):  Temp:  [97.5 °F (36.4 °C)-98.3 °F (36.8 °C)] 98.3 °F (36.8 °C)  Pulse:  [60-92] 72  Resp:  [15-29] 22  SpO2:  [95 %-100 %] 98 %  BP: (135-259)/() 144/66     Weight: 56.5 kg (124 lb 9 oz)  Body mass index is 20.73 kg/m².    Physical Exam   Constitutional: She is oriented to person, place, and time. She appears well-developed and well-nourished.   Thin, frail acutely ill appearing elderly female on continuous Bipap   HENT:   Head: Normocephalic and atraumatic.   Eyes: Conjunctivae are normal. Pupils are equal, round, and reactive to light.   Neck: Neck supple. JVD present. No thyromegaly present.   Cardiovascular: Normal rate, regular rhythm, normal heart sounds and intact distal pulses.  Exam reveals no gallop and no friction rub.    No murmur heard.  Pulmonary/Chest: Breath sounds normal.   Patient on continuous Bipap    Abdominal: Soft. Bowel sounds are normal. She exhibits no distension. There is no tenderness. There is no guarding.   Musculoskeletal: Normal range of motion. She exhibits edema.   1+ pitting edema of lower extremities bilaterally   Lymphadenopathy:     She has no cervical adenopathy.   Neurological: She is alert and oriented to person, place, and time. She has normal reflexes.   Skin: Skin is warm and dry. No rash noted. No erythema. No pallor.   Psychiatric: She has a normal mood and affect. Her behavior is normal.         CRANIAL NERVES     CN III, IV, VI   Pupils are equal, round, and reactive to light.       Significant Labs:   ABGs:   Recent Labs  Lab 04/28/18  0948   PH 7.373   PCO2 44.3   HCO3 25.8   POCSATURATED 100   BE 1     CBC:   Recent Labs  Lab 04/28/18  0936   WBC 5.41   HGB 8.6*   HCT 27.1*        CMP:   Recent Labs  Lab 04/28/18  0936      K 4.4      CO2 21*   GLU 95   BUN 55*   CREATININE 2.7*   CALCIUM 8.9   PROT 7.1    ALBUMIN 3.1*   BILITOT 0.2   ALKPHOS 95   AST 39   ALT 19   ANIONGAP 13   EGFRNONAA 17*     Troponin:   Recent Labs  Lab 04/28/18  0936   TROPONINI 0.210*       Significant Imaging: I have reviewed and interpreted all pertinent imaging results/findings within the past 24 hours.    Assessment/Plan:     * Acute combined systolic and diastolic congestive heart failure    Lasix 80mg IV Q8  Strict I/Os              Flash pulmonary edema    Lasix 80mg IV Q8  Supplemental oxygen and continuous Bipap for now          Acute hypoxemic respiratory failure    Supplemental oxygen to maintain sats >92%  Continuous Bipap 12/5          Anemia associated with chronic renal failure    H/H decreased but no acute management needed          Pulmonary hypertension    No acute management needed          CKD (chronic kidney disease), stage IV    Consult Nephrology as patient is well known to them          Acquired hypothyroidism    Continue usual thyroid supplementation          Resistant hypertension    Continue current anti-hypertensive regimen          Coronary artery disease due to calcified coronary lesion    Mildly elevated troponin I  Trend cardiac enzymes          Other forms of systemic lupus erythematosus    No acute management needed, continue Plaquenil            VTE Risk Mitigation         Ordered     heparin (porcine) injection 5,000 Units  Every 8 hours      04/28/18 1228     IP VTE HIGH RISK PATIENT  Once      04/28/18 1228     Place sequential compression device  Until discontinued      04/28/18 1228             Loretta Anthony MD  Department of Hospital Medicine   [facility]

## 2018-04-29 NOTE — PROGRESS NOTES
Rested in chair most of shift on room air c Os sats 96%. Denied anxiety or chest pain. HTN at beginning and end of shift (scheduled PO BP medications administered at 0949). Reinier. Issa rice.

## 2018-04-29 NOTE — PROGRESS NOTES
Progress Note  [facility]    Admit Date: 4/28/2018   LOS: 1 day     SUBJECTIVE:     Follow-up For:      Interval History:     Feels much better this am.    Review of Systems:  Constitutional: No fever or chills  Respiratory: No cough or shortness of breath  Cardiovascular: No chest pain or palpitations  Gastrointestinal: No nausea or vomiting  Neurological: No confusion or weakness    OBJECTIVE:     Vital Signs Range (Last 24H):  Vitals:    04/29/18 1000   BP: (!) 167/79   Pulse: 64   Resp: 20   Temp:        Temp:  [98 °F (36.7 °C)-98.3 °F (36.8 °C)]   Pulse:  [62-78]   Resp:  [14-29]   BP: (124-194)/(59-88)   SpO2:  [95 %-100 %]     I & O (Last 24H):  Intake/Output Summary (Last 24 hours) at 04/29/18 1220  Last data filed at 04/29/18 1000   Gross per 24 hour   Intake              468 ml   Output             1530 ml   Net            -1062 ml       Physical Exam:  General appearance: Well developed, well nourished  Eyes:  Conjunctivae/corneas clear. PERRL.  Lungs: Normal respiratory effort,   clear to auscultation bilaterally some faint rhonchi/crackles left lower lobe.  Heart: Regular rate and rhythm, S1, S2 normal, no murmur, rub or ruma.  Abdomen: Soft, non-tender non-distended; bowel sounds normal; no masses,  no organomegaly  Extremities: No cyanosis or clubbing. 1+ pedal  edema.  DP pulses 2+/4+ bilaterally.  Skin: Skin color, texture, turgor normal. No rashes or lesions  Neurologic: Normal strength and tone. No focal numbness or weakness     Laboratory Data:      Recent Labs  Lab 04/29/18  0400      K 4.3      CO2 21*   BUN 60*   CREATININE 2.7*   CALCIUM 8.7   PHOS 5.7*     Lab Results   Component Value Date    .0 (H) 11/21/2016    CALCIUM 8.7 04/29/2018    PHOS 5.7 (H) 04/29/2018     Lab Results   Component Value Date    IRON 32 04/02/2018    TIBC 250 04/02/2018    FERRITIN 261 04/04/2018       Medications:  Medication list was reviewed and changes noted under  Assessment/Plan.    Diagnostic Results:    Reviewed most recent CT/US/Echo/MRI    ASSESSMENT/PLAN:       nonoliguric MARYAM on CKD 3 with uni-nephric hypertensive heart and acute on chronic diastolic HTN/ Acc HTN (N17.9, N18.3, I13.0, I50.33): Prior renal arterial doppler with some evidence of FRNA but repeat US (x3) reviewed by Dr. Lea does not show significant stenosis last month. Patient claims compliance with Meds and diet.  -Baseline creatinine around 1.75. 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  This is being worked up again by Dr. Brizuela  -No ARB/[place] for now  -Renally dose meds, avoid nephrotoxins, and monitor I/O's closely.  -I think patient needs lasix BID. Consider scleroderma in differential for flash pulm edema but no other stigmata. Discussed with Dr. Lea who does not feel this is caused by AS.     Accelerated HTN  (I 13.0):  -Restart home medications with hold parameters once more stable  -flash Pulmonary edema likely from hypertensive crisis but what caused the crisis?  -can't use clonidine or acei due to severe reactions.  -hope that changing lasix to BID will help.     SLE (M32.9):  Continue plaquenil.  Defer. Anti-histone negative.    Heart Failure, Diastolic, Acute on Chronic              4/2/2018: Normal left ventricular size with low normal to mildly depressed systolic function. EF 50-55%. Severe LVH. Moderate diastolic dysfunction. Moderate to severe AS - 3.3 m/s - 0.9 cm2. SPAP 52 mmHg. Trivial pericardial effusion.               Probably low flow moderate to severe AS with normal EF.              Doubt AS much of a cause for her condition but diastolic dysfunction advanced.              4/28/2018: BNP 4,697.              Diuresis.    Thank you for allowing us to participate in the care of your patient. We will follow the patient and provide recommendations as needed.  Time spent seeing  patient( greater than 1/2 spent in direct contact) :65min

## 2018-04-29 NOTE — CONSULTS
[facility]  Cardiology  Consult Note    Patient Name: Ewelina Arnold  MRN: [id number]  Admission Date: 4/28/2018  Hospital Length of Stay: 1 days  Code Status: Full Code   Attending Provider: Loretta Anthony MD   Consulting Provider: Risa Carmichael MD  Primary Care Physician: Kalie Walton MD  Principal Problem:Acute combined systolic and diastolic congestive heart failure    Patient information was obtained from patient, past medical records and ER records.     Inpatient consult to Cardiology  Consult performed by: RISA CARMICHAEL  Consult ordered by: NICOLLE VALENZUELA JR  Reason for consult: Chest pain and pulmonary edema        Subjective:     Chief Complaint: Shortness of Breath.      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 She developed lupus in 1994. Over the last year accelerated hypertension and rise in BUN/crea. On 1/18/2018 she had a Renal Duplex that did not reveal any increased velocity in the right renal artery but possibly dampened velocities more distally. She had a repeat Renal Duplex on 3/20/2018 that reveled a similar peak velocity in the right renal artery of 0.64 m/s. On 4/4/2018 she had a third U/S of the renal artery that revealed a peak velocity 0.79 m/s. I was then asked to see her for the possibility of FRAN and advised against angiography as I felt FRAN was unlikely.     She was seen as an outpatient by her PCP on 4/19/2018 and then had a blood pressure of 140/60 mmHg. She became increasingly short of breath beginning on about 4/26/2018. She present with a blood pressure of 259/123 in respiratory distress to the ER and is in pulmonary edema. No acute ST T wave changes. She is admitted to the ICU. Breathing much improved after diuresis and control of hypertension.    Past Medical History:   Diagnosis Date    Acute hypoxemic respiratory failure 4/28/2018    Acute on chronic diastolic congestive heart  failure 11/17/2017    Allergy     Anatomical narrow angle glaucoma     Anemia     States diagnosed about a month ago    Aortic atherosclerosis     Arthritis     Cataract     CHF (congestive heart failure)     Chronic kidney disease     Chronic sciatica of left side     Right lower back pain due to sciatica    Coronary artery disease     Depression     Dry eyes     GERD (gastroesophageal reflux disease)     History of colon cancer     Hyperaldosteronism     Hyperlipidemia     Hypertension     Hypothyroidism     Left ventricular diastolic dysfunction with preserved systolic function     Lupus (systemic lupus erythematosus) 8/17/2012    Malnutrition 5/16/2017    Osteoarthritis of cervical spine 8/17/2012    Osteopenia     PAD (peripheral artery disease)     FRAN (renal artery stenosis)        Past Surgical History:   Procedure Laterality Date    CARDIAC CATHETERIZATION  07/27/2011    x1    CHOLECYSTECTOMY  1999    COLON SURGERY  2000 & 2003    partial removal    COLONOSCOPY N/A 4/14/2016    Procedure: COLONOSCOPY;  Surgeon: Jose Steen MD;  Location: [facility] ([address] [place] [address]);  Service: Endoscopy;  Laterality: N/A;  prep 2 days prior light meals only/clear liquid day before  and 4 dulcolax tabs (5 mgs) at noon    COLONOSCOPY N/A 9/14/2017    Procedure: COLONOSCOPY;  Surgeon: Jose Steen MD;  Location: [facility] ([address] [place] [address]);  Service: Endoscopy;  Laterality: N/A;    EYE SURGERY      HAND SURGERY Bilateral 1996 & 1997    due to carpal tunnel     HERNIA REPAIR      HYSTERECTOMY  1983    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    OOPHORECTOMY      removed one    Peripheral Iridotomy both eyes  1994    laser angle correction    THYROIDECTOMY, PARTIAL  2006    to remove two nodules and one-half thyroid       Review of patient's allergies indicates:   Allergen Reactions    Ace inhibitors Swelling     Lips Swelling    Vioxx [rofecoxib] Swelling      lips swelling    Bactrim  [sulfamethoxazole-trimethoprim]      Pt would like this medication to be added due to elevation of creatinine with single kidney.    Clonidine Hallucinations     Hallucinations    Lactose     Arthrotec 50 [diclofenac-misoprostol]      Unknown    Bentyl [dicyclomine]      Not effective    Doxycycline Nausea Only    Lomotil [diphenoxylate-atropine] Nausea And Vomiting     Stomach cramps with vomitting    Lozol [indapamide] Rash    Norvasc [amlodipine]      Not tolerated    Tramadol Nausea Only       No current facility-administered medications on file prior to encounter.      Current Outpatient Prescriptions on File Prior to Encounter   Medication Sig    aspirin (ECOTRIN) 81 MG EC tablet Take 1 tablet (81 mg total) by mouth once daily.    carvedilol (COREG) 25 MG tablet Take 1 tablet (25 mg total) by mouth 2 (two) times daily.    citalopram (CELEXA) 20 MG tablet Take 1.5 tablets (30 mg total) by mouth once daily.    ERGOCALCIFEROL, VITAMIN D2, (VITAMIN D ORAL) Take by mouth.    famotidine (PEPCID) 20 MG tablet Take 1 tablet (20 mg total) by mouth once daily.    furosemide (LASIX) 40 MG tablet Take 1 tablet (40 mg total) by mouth once daily.    hydrALAZINE (APRESOLINE) 100 MG tablet Take 1 tablet (100 mg total) by mouth every 12 (twelve) hours.    hydroxychloroquine (PLAQUENIL) 200 mg tablet TAKE 1 TABLET TWICE DAILY    isosorbide mononitrate (IMDUR) 30 MG 24 hr tablet Take 1 tablet (30 mg total) by mouth once daily.    latanoprost 0.005 % ophthalmic solution INSTILL 1 DROP INTO BOTH EYES EVERY DAY    levothyroxine (SYNTHROID) 75 MCG tablet Take 1 tablet (75 mcg total) by mouth before breakfast.    NIFEdipine (PROCARDIA-XL) 90 MG (OSM) 24 hr tablet Take 1 tablet (90 mg total) by mouth once daily.    rosuvastatin (CRESTOR) 10 MG tablet Take 1 tablet (10 mg total) by mouth every evening.    sodium bicarbonate 650 MG tablet Take 2 tablets (1,300 mg total) by mouth 2 (two) times daily.     Family  History     Problem Relation (Age of Onset)    Diabetes Maternal Grandmother, Son, Maternal Aunt    Glaucoma Maternal Grandmother    Heart attack Mother    Heart disease Father    Hypertension Mother, Father, Sister, Brother    Kidney disease Brother    Kidney failure Brother    No Known Problems Daughter, Maternal Grandfather, Paternal Grandmother, Paternal Grandfather        Social History Main Topics    Smoking status: Former Smoker     Packs/day: 1.50     Years: 30.00     Types: Cigarettes     Start date: 1955     Quit date: 3/13/1985    Smokeless tobacco: Never Used    Alcohol use No    Drug use: No    Sexual activity: Not Currently     Partners: Male     Birth control/ protection: Abstinence     Review of Systems   Constitution: Positive for weakness and malaise/fatigue. Negative for chills and fever.   HENT: Negative for nosebleeds.    Eyes: Negative for double vision, vision loss in left eye and vision loss in right eye.   Cardiovascular: Positive for chest pain and dyspnea on exertion. Negative for claudication, irregular heartbeat, leg swelling, near-syncope, orthopnea, palpitations, paroxysmal nocturnal dyspnea and syncope.   Respiratory: Positive for shortness of breath. Negative for cough, hemoptysis and wheezing.    Endocrine: Negative for cold intolerance and heat intolerance.   Hematologic/Lymphatic: Negative for bleeding problem. Does not bruise/bleed easily.   Skin: Negative for color change and rash.   Musculoskeletal: Negative for back pain, falls, muscle weakness and myalgias.   Gastrointestinal: Negative for heartburn, hematemesis, hematochezia, hemorrhoids, jaundice, melena, nausea and vomiting.   Genitourinary: Negative for dysuria and hematuria.   Neurological: Negative for dizziness, focal weakness, headaches, light-headedness, loss of balance, numbness and vertigo.   Psychiatric/Behavioral: Negative for altered mental status, depression and memory loss. The patient is not  nervous/anxious.    Allergic/Immunologic: Negative for hives and persistent infections.     Objective:     Vital Signs (Most Recent):  Temp: 98 °F (36.7 °C) (04/29/18 0715)  Pulse: 64 (04/29/18 1000)  Resp: 20 (04/29/18 1000)  BP: (!) 167/79 (04/29/18 1000)  SpO2: 100 % (04/29/18 1000) Vital Signs (24h Range):  Temp:  [98 °F (36.7 °C)-98.3 °F (36.8 °C)] 98 °F (36.7 °C)  Pulse:  [62-78] 64  Resp:  [14-29] 20  SpO2:  [95 %-100 %] 100 %  BP: (124-236)/() 167/79     Weight: 56.5 kg (124 lb 9 oz)  Body mass index is 20.73 kg/m².    SpO2: 100 %  O2 Device (Oxygen Therapy): nasal cannula      Intake/Output Summary (Last 24 hours) at 04/29/18 1028  Last data filed at 04/29/18 1000   Gross per 24 hour   Intake              468 ml   Output             1680 ml   Net            -1212 ml       Lines/Drains/Airways     Drain                 Urethral Catheter 04/28/18 1020 Non-latex 16 Fr. 1 day          Peripheral Intravenous Line                 Peripheral IV - Single Lumen 04/28/18 0908 Left Wrist 1 day         Peripheral IV - Single Lumen 04/28/18 1801 Right Wrist less than 1 day                Physical Exam   Constitutional: She is oriented to person, place, and time. She appears well-developed.  Non-toxic appearance. She appears ill. No distress.   HENT:   Head: Normocephalic and atraumatic.   Nose: Nose normal.   Eyes: Right eye exhibits no discharge. Left eye exhibits no discharge. Right conjunctiva is not injected. Left conjunctiva is not injected. Right pupil is round. Left pupil is round. Pupils are equal.   Neck: Neck supple. No JVD present. Carotid bruit is present. No thyromegaly present.   Cardiovascular: Normal rate, regular rhythm, S1 normal and S2 normal.   No extrasystoles are present. PMI is not displaced.  Exam reveals gallop and S4.    Murmur heard.   Harsh midsystolic murmur is present with a grade of 3/6  at the upper right sternal border radiating to the neck  Pulses:       Radial pulses are 2+ on  the right side, and 2+ on the left side.        Femoral pulses are 2+ on the right side, and 2+ on the left side.  Pulmonary/Chest: Effort normal. She has no decreased breath sounds. She has no wheezes. She has no rhonchi. She has rales in the right middle field, the right lower field, the left middle field and the left lower field.   Abdominal: Soft. Normal appearance. There is no hepatosplenomegaly. There is no tenderness.   Musculoskeletal:        Right ankle: She exhibits no swelling, no ecchymosis and no deformity.        Left ankle: She exhibits no swelling, no ecchymosis and no deformity.   Lymphadenopathy:        Head (right side): No submandibular adenopathy present.        Head (left side): No submandibular adenopathy present.     She has no cervical adenopathy.   Neurological: She is alert and oriented to person, place, and time. She is not disoriented. No cranial nerve deficit.   Skin: Skin is warm, dry and intact. No rash noted. She is not diaphoretic.   Psychiatric: She has a normal mood and affect. Her speech is normal and behavior is normal. Judgment and thought content normal. Cognition and memory are normal.     Current Medications:     aspirin  81 mg Oral Daily    carvedilol  25 mg Oral BID    citalopram  30 mg Oral Daily    furosemide  40 mg Intravenous BID    heparin (porcine)  5,000 Units Subcutaneous Q8H    hydrALAZINE  100 mg Oral Q12H    hydroxychloroquine  200 mg Oral BID    isosorbide mononitrate  30 mg Oral Daily    latanoprost  1 drop Both Eyes Daily    levothyroxine  75 mcg Oral Before breakfast    NIFEdipine  90 mg Oral Daily    rosuvastatin  10 mg Oral QHS    sodium bicarbonate  1 tablet Oral BID     Current Laboratory Results:    Recent Results (from the past 24 hour(s))   Renal function panel    Collection Time: 04/29/18  4:00 AM   Result Value Ref Range    Glucose 53 (L) 70 - 110 mg/dL    Sodium 136 136 - 145 mmol/L    Potassium 4.3 3.5 - 5.1 mmol/L    Chloride 102 95  - 110 mmol/L    CO2 21 (L) 23 - 29 mmol/L    BUN, Bld 60 (H) 8 - 23 mg/dL    Calcium 8.7 8.7 - 10.5 mg/dL    Creatinine 2.7 (H) 0.5 - 1.4 mg/dL    Albumin 2.6 (L) 3.5 - 5.2 g/dL    Phosphorus 5.7 (H) 2.7 - 4.5 mg/dL    eGFR if African American 20 (A) >60 mL/min/1.73 m^2    eGFR if non African American 17 (A) >60 mL/min/1.73 m^2    Anion Gap 13 8 - 16 mmol/L   Magnesium    Collection Time: 04/29/18  4:00 AM   Result Value Ref Range    Magnesium 1.8 1.6 - 2.6 mg/dL   POCT glucose    Collection Time: 04/29/18  7:07 AM   Result Value Ref Range    POCT Glucose 103 70 - 110 mg/dL     Current Imaging Results:    Imaging Results          X-Ray Chest AP Portable (Final result)  Result time 04/28/18 09:55:58    Final result by Jyotsna Beaver MD (04/28/18 09:55:58)                 Impression:      No adverse interval change.      Electronically signed by: Jyotsna Beaver MD  Date:    04/28/2018  Time:    09:55             Narrative:    EXAMINATION:  XR CHEST AP PORTABLE    CLINICAL HISTORY:  CHF;    TECHNIQUE:  Single frontal view of the chest was performed.    COMPARISON:  04/14/2018    FINDINGS:  As compared to the previous study of 04/14/2018, there has been no significant change in the cardiopulmonary findings.                                Assessment and Plan:     Active Diagnoses:    Diagnosis Date Noted POA    PRINCIPAL PROBLEM:  Acute combined systolic and diastolic congestive heart failure [I50.41] 04/28/2018 Yes    Acute hypoxemic respiratory failure [J96.01] 04/28/2018 Yes    Flash pulmonary edema [J81.0] 04/12/2018 Yes    Nonrheumatic aortic valve stenosis [I35.0] 11/18/2017 Yes    Anemia associated with chronic renal failure [D63.1] 07/25/2017 Yes    Pulmonary hypertension [I27.20] 05/16/2017 Yes    CKD (chronic kidney disease), stage IV [N18.4] 01/23/2017 Yes    Acquired hypothyroidism [E03.9] 05/13/2015 Yes    Coronary artery disease due to calcified coronary lesion [I25.10, I25.84] 10/05/2012 Yes     Resistant hypertension [I10] 10/05/2012 Yes    Other forms of systemic lupus erythematosus [M32.8] 08/17/2012 Yes     Chronic      Problems Resolved During this Admission:    Diagnosis Date Noted Date Resolved POA     Assessment and Plan:     1. Chronic Kidney Disease, Stage 4              4/2/2018: BUN/crea 47/2.6. CrCl 20.   4/29/2018: BUN/crea 60/2.7. CrCl 17.              Appears to be major issue.     2. Heart Failure, Diastolic, Acute on Chronic              4/2/2018: Normal left ventricular size with low normal to mildly depressed systolic function. EF 50-55%. Severe LVH. Moderate diastolic dysfunction. Moderate to severe AS - 3.3 m/s - 0.9 cm2. SPAP 52 mmHg. Trivial pericardial effusion.               Probably low flow moderate to severe AS with normal EF.              Doubt AS much of a cause for her condition but diastolic dysfunction advanced.              4/28/2018: BNP 4,697.              Diuresis.     3. Hypertension              Severe and accelerated.              1/18/2018: U/S Renal: Right Renal Artery: Poorly visualized ostium. Peak velocity 0.64 m/s.               3/20/2018: U/S Renal: Right Renal Artery: Peak velocity 0.62 m/s.              4/4/2018: U/S Renal: Right Renal Artery: Peak velocity 0.79 m/s.               That the above three studies do not reveal any increased velocity in the right renal artery makes significant renal artery stenosis as a cause for her severe hypertension and rise in BUN/crea unlikely.              I would not favor angiography.              At Home: On carvedilol 25 mg Q12, hydralazine 100 mg Q12, isosorbide mononitrate 30 mg Q24, nifedipine 90 mg Q24 and furosemide 40 mg Q24.     4. Hypercholesterolemia              On rosuvastatin 10 mg Q24.    VTE Risk Mitigation         Ordered     heparin (porcine) injection 5,000 Units  Every 8 hours      04/28/18 1228     IP VTE HIGH RISK PATIENT  Once      04/28/18 1228     Place sequential compression device  Until  discontinued      04/28/18 1220          Thank you for your consult.    I will follow-up with patient. Please contact us if you have any additional questions.    Risa Lea MD  Cardiology   [facility]

## 2018-04-29 NOTE — ASSESSMENT & PLAN NOTE
Lasix 80mg IV Q8, good diuresis  Improved clinical status  Decrease Lasix to BID dosing  Strict I/Os

## 2018-04-29 NOTE — PROGRESS NOTES
[facility] Hospital Medicine  Progress Note    Patient Name: Ewelina Arnold  MRN: [id number]  Patient Class: IP- Inpatient   Admission Date: 4/28/2018  Length of Stay: 1 days  Attending Physician: Loretta Anthony MD  Primary Care Provider: Kalie Walton MD        Subjective:     Principal Problem:Acute combined systolic and diastolic congestive heart failure    HPI:  This is a 72 year old female with suspected renal artery stenosis, hyperaldosteronism, CHF, HTN, CKD, stage IV and frequent hospitalizations for suspected flash pulmonary edema secondary to hypertensive emergencies presents with complaints of shortness of breath, difficulty breathing, and chest pain.  Upon arrival to the ED, the patient had a elevated BNP of 4697 and troponin I 0.21.  Systolic BP was up to the 230s systolic.      Hospital Course:  The patient is receiving IV Lasix 40mg Q8 and has diuresed 1.5L.  Bipap has been discontinued and the patient has stable respiratory status.      Interval History: The patient has less sob with exertion and stable with supplemental oxygen off Bipap    Review of Systems   Respiratory: Positive for shortness of breath.      Objective:     Vital Signs (Most Recent):  Temp: 98 °F (36.7 °C) (04/29/18 0715)  Pulse: 66 (04/29/18 0902)  Resp: (!) 24 (04/29/18 0902)  BP: (!) 158/71 (04/29/18 0902)  SpO2: 99 % (04/29/18 0902) Vital Signs (24h Range):  Temp:  [98 °F (36.7 °C)-98.3 °F (36.8 °C)] 98 °F (36.7 °C)  Pulse:  [62-78] 66  Resp:  [14-29] 24  SpO2:  [95 %-100 %] 99 %  BP: (124-250)/() 158/71     Weight: 56.5 kg (124 lb 9 oz)  Body mass index is 20.73 kg/m².    Intake/Output Summary (Last 24 hours) at 04/29/18 0956  Last data filed at 04/29/18 0800   Gross per 24 hour   Intake              100 ml   Output             1645 ml   Net            -1545 ml      Physical Exam   Constitutional: She is oriented to person, place, and time. She appears well-developed and well-nourished.    Thin, frail elderly female, pleasant in NAD    HENT:   Head: Normocephalic and atraumatic.   Eyes: Conjunctivae are normal. Pupils are equal, round, and reactive to light.   Neck: Neck supple. JVD present. No thyromegaly present.   Cardiovascular: Normal rate, regular rhythm and intact distal pulses.  Exam reveals no gallop and no friction rub.    Murmur heard.   Crescendo systolic murmur is present with a grade of 3/6   Pulmonary/Chest: Breath sounds normal.   Patient on continuous Bipap    Abdominal: Soft. Bowel sounds are normal. She exhibits no distension. There is no tenderness. There is no guarding.   Musculoskeletal: Normal range of motion. She exhibits edema.   1+ pitting edema of lower extremities bilaterally   Lymphadenopathy:     She has no cervical adenopathy.   Neurological: She is alert and oriented to person, place, and time. She has normal reflexes.   Skin: Skin is warm and dry. No rash noted. No erythema. No pallor.   Psychiatric: She has a normal mood and affect. Her behavior is normal. Judgment and thought content normal.       Significant Labs:   CBC:   Recent Labs  Lab 04/28/18  0936   WBC 5.41   HGB 8.6*   HCT 27.1*        CMP:   Recent Labs  Lab 04/28/18  0936 04/29/18  0400    136   K 4.4 4.3    102   CO2 21* 21*   GLU 95 53*   BUN 55* 60*   CREATININE 2.7* 2.7*   CALCIUM 8.9 8.7   PROT 7.1  --    ALBUMIN 3.1* 2.6*   BILITOT 0.2  --    ALKPHOS 95  --    AST 39  --    ALT 19  --    ANIONGAP 13 13   EGFRNONAA 17* 17*     Magnesium:   Recent Labs  Lab 04/29/18  0400   MG 1.8       Significant Imaging: I have reviewed and interpreted all pertinent imaging results/findings within the past 24 hours.    Assessment/Plan:      * Acute combined systolic and diastolic congestive heart failure    Lasix 80mg IV Q8, good diuresis  Improved clinical status  Decrease Lasix to BID dosing  Strict I/Os              Flash pulmonary edema    Lasix 80mg IV Q8, decrease to BID  Supplemental  oxygen           Acute hypoxemic respiratory failure    Supplemental oxygen to maintain sats >92%  Continuous Bipap 12/5          Nonrheumatic aortic valve stenosis    The patient is not a candidate for TAVR based on severity of valvular disease  Continue to try and maximize medical management          Anemia associated with chronic renal failure    H/H decreased but no acute management needed          Pulmonary hypertension    No acute management needed          CKD (chronic kidney disease), stage IV    Appreciate Nephrology evaluation and recs  Renal status unchanged          Acquired hypothyroidism    Continue usual thyroid supplementation          Resistant hypertension    Continue current anti-hypertensive regimen  BP better controlled  Cardiology consult pending for further assessment of renovascular disease?          Coronary artery disease due to calcified coronary lesion    Mildly elevated troponin I, trended downward          Other forms of systemic lupus erythematosus    No acute management needed            VTE Risk Mitigation         Ordered     heparin (porcine) injection 5,000 Units  Every 8 hours      04/28/18 1228     IP VTE HIGH RISK PATIENT  Once      04/28/18 1228     Place sequential compression device  Until discontinued      04/28/18 1228              Loretta Anthony MD  Department of Hospital Medicine   [facility]

## 2018-04-29 NOTE — HOSPITAL COURSE
The patient is received IV Lasix 40mg Q8 and has diuresed 1.5L.  Bipap has been discontinued and the patient has a very stable respiratory status.  Nephrology consulted and started po Lasix 80mg BID and Metolazone.  She has difficult to control BP and slowly titrating medications for improved control but had issues with low BP and dropping with activity so medications had to be readjusted. The patient has difficulty with minimal exertion and PT/OT will assist patient in assessing ADLs and discharge planning. Her BP finally stabilized and she is agreeable to [facility] with skilled RN. She will f/u closely outpatient.

## 2018-04-29 NOTE — SUBJECTIVE & OBJECTIVE
Past Medical History:   Diagnosis Date    Acute hypoxemic respiratory failure 4/28/2018    Acute on chronic diastolic congestive heart failure 11/17/2017    Allergy     Anatomical narrow angle glaucoma     Anemia     States diagnosed about a month ago    Aortic atherosclerosis     Arthritis     Cataract     CHF (congestive heart failure)     Chronic kidney disease     Chronic sciatica of left side     Right lower back pain due to sciatica    Coronary artery disease     Depression     Dry eyes     GERD (gastroesophageal reflux disease)     History of colon cancer     Hyperaldosteronism     Hyperlipidemia     Hypertension     Hypothyroidism     Left ventricular diastolic dysfunction with preserved systolic function     Lupus (systemic lupus erythematosus) 8/17/2012    Malnutrition 5/16/2017    Osteoarthritis of cervical spine 8/17/2012    Osteopenia     PAD (peripheral artery disease)     FRAN (renal artery stenosis)        Past Surgical History:   Procedure Laterality Date    CARDIAC CATHETERIZATION  07/27/2011    x1    CHOLECYSTECTOMY  1999    COLON SURGERY  2000 & 2003    partial removal    COLONOSCOPY N/A 4/14/2016    Procedure: COLONOSCOPY;  Surgeon: Jose Steen MD;  Location: [facility] (4TH FLR);  Service: Endoscopy;  Laterality: N/A;  prep 2 days prior light meals only/clear liquid day before  and 4 dulcolax tabs (5 mgs) at noon    COLONOSCOPY N/A 9/14/2017    Procedure: COLONOSCOPY;  Surgeon: Jose Steen MD;  [facility]  Service: Endoscopy;  Laterality: N/A;    EYE SURGERY      HAND SURGERY Bilateral 1996 & 1997    due to carpal tunnel     HERNIA REPAIR      HYSTERECTOMY  1983    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    Clonidine Hallucinations     Hallucinations    Lactose     Arthrotec 50 [diclofenac-misoprostol]      Unknown    Bentyl [dicyclomine]      Not effective    Doxycycline Nausea Only    Lomotil [diphenoxylate-atropine] Nausea And Vomiting     Stomach cramps with vomitting    Lozol [indapamide] Rash    Norvasc [amlodipine]      Not tolerated    Tramadol Nausea Only       No current facility-administered medications on file prior to encounter.      Current Outpatient Prescriptions on File Prior to Encounter   Medication Sig    aspirin (ECOTRIN) 81 MG EC tablet Take 1 tablet (81 mg total) by mouth once daily.    carvedilol (COREG) 25 MG tablet Take 1 tablet (25 mg total) by mouth 2 (two) times daily.    citalopram (CELEXA) 20 MG tablet Take 1.5 tablets (30 mg total) by mouth once daily.    ERGOCALCIFEROL, VITAMIN D2, (VITAMIN D ORAL) Take by mouth.    famotidine (PEPCID) 20 MG tablet Take 1 tablet (20 mg total) by mouth once daily.    furosemide (LASIX) 40 MG tablet Take 1 tablet (40 mg total) by mouth once daily.    hydrALAZINE (APRESOLINE) 100 MG tablet Take 1 tablet (100 mg total) by mouth every 12 (twelve) hours.    hydroxychloroquine (PLAQUENIL) 200 mg tablet TAKE 1 TABLET TWICE DAILY    isosorbide mononitrate (IMDUR) 30 MG 24 hr tablet Take 1 tablet (30 mg total) by mouth once daily.    latanoprost 0.005 % ophthalmic solution INSTILL 1 DROP INTO BOTH EYES EVERY DAY    levothyroxine (SYNTHROID) 75 MCG tablet Take 1 tablet (75 mcg total) by mouth before breakfast.    NIFEdipine (PROCARDIA-XL) 90 MG (OSM) 24 hr tablet Take 1 tablet (90 mg total) by mouth once daily.    rosuvastatin (CRESTOR) 10 MG tablet Take 1 tablet (10 mg total) by mouth  every evening.    sodium bicarbonate 650 MG tablet Take 2 tablets (1,300 mg total) by mouth 2 (two) times daily.     Family History     Problem Relation (Age of Onset)    Diabetes Maternal Grandmother, Son, Maternal Aunt    Glaucoma Maternal Grandmother    Heart attack Mother    Heart disease Father    Hypertension Mother, Father, Sister, Brother    Kidney disease Brother    Kidney failure Brother    No Known Problems Daughter, Maternal Grandfather, Paternal Grandmother, Paternal Grandfather        Social History Main Topics    Smoking status: Former Smoker     Packs/day: 1.50     Years: 30.00     Types: Cigarettes     Start date: 1955     Quit date: 3/13/1985    Smokeless tobacco: Never Used    Alcohol use No    Drug use: No    Sexual activity: Not Currently     Partners: Male     Birth control/ protection: Abstinence     Review of Systems   Unable to perform ROS: Acuity of condition     Objective:     Vital Signs (Most Recent):  Temp: 98.3 °F (36.8 °C) (04/28/18 1903)  Pulse: 72 (04/28/18 1903)  Resp: (!) 22 (04/28/18 1903)  BP: (!) 144/66 (04/28/18 1903)  SpO2: 98 % (04/28/18 1903) Vital Signs (24h Range):  Temp:  [97.5 °F (36.4 °C)-98.3 °F (36.8 °C)] 98.3 °F (36.8 °C)  Pulse:  [60-92] 72  Resp:  [15-29] 22  SpO2:  [95 %-100 %] 98 %  BP: (135-259)/() 144/66     Weight: 56.5 kg (124 lb 9 oz)  Body mass index is 20.73 kg/m².    Physical Exam   Constitutional: She is oriented to person, place, and time. She appears well-developed and well-nourished.   Thin, frail acutely ill appearing elderly female on continuous Bipap   HENT:   Head: Normocephalic and atraumatic.   Eyes: Conjunctivae are normal. Pupils are equal, round, and reactive to light.   Neck: Neck supple. JVD present. No thyromegaly present.   Cardiovascular: Normal rate, regular rhythm, normal heart sounds and intact distal pulses.  Exam reveals no gallop and no friction rub.    No murmur heard.  Pulmonary/Chest: Breath sounds normal.   Patient  on continuous Bipap    Abdominal: Soft. Bowel sounds are normal. She exhibits no distension. There is no tenderness. There is no guarding.   Musculoskeletal: Normal range of motion. She exhibits edema.   1+ pitting edema of lower extremities bilaterally   Lymphadenopathy:     She has no cervical adenopathy.   Neurological: She is alert and oriented to person, place, and time. She has normal reflexes.   Skin: Skin is warm and dry. No rash noted. No erythema. No pallor.   Psychiatric: She has a normal mood and affect. Her behavior is normal.         CRANIAL NERVES     CN III, IV, VI   Pupils are equal, round, and reactive to light.       Significant Labs:   ABGs:   Recent Labs  Lab 04/28/18  0948   PH 7.373   PCO2 44.3   HCO3 25.8   POCSATURATED 100   BE 1     CBC:   Recent Labs  Lab 04/28/18  0936   WBC 5.41   HGB 8.6*   HCT 27.1*        CMP:   Recent Labs  Lab 04/28/18  0936      K 4.4      CO2 21*   GLU 95   BUN 55*   CREATININE 2.7*   CALCIUM 8.9   PROT 7.1   ALBUMIN 3.1*   BILITOT 0.2   ALKPHOS 95   AST 39   ALT 19   ANIONGAP 13   EGFRNONAA 17*     Troponin:   Recent Labs  Lab 04/28/18  0936   TROPONINI 0.210*       Significant Imaging: I have reviewed and interpreted all pertinent imaging results/findings within the past 24 hours.

## 2018-04-29 NOTE — PROGRESS NOTES
Pt's states she has lost her normally used glasses and is using a backup pair. She states that these glasses are causing her pain. The skin on the bridge of the nose where the glasses rest is bruised. Given a mepilex to cushion glasses against skin.

## 2018-04-29 NOTE — PLAN OF CARE
CM met with pt for initial discharge planning assessment.    Pt confirms her PCP is Kalie Radford MD    Pt states her pharmacy of choice is [facility] on  [address].     Pt states he adult grandson lives with her currently. She is independent and will likely have no CM needs for discharge.  Her daughter will transport her home.  CM to follow for any needs that may arise.     04/29/18 1126   Discharge Assessment   Assessment Type Discharge Planning Assessment   Confirmed/corrected address and phone number on facesheet? Yes   Assessment information obtained from? Patient;Medical Record   Expected Length of Stay (days) 3   Communicated expected length of stay with patient/caregiver yes   Prior to hospitilization cognitive status: Alert/Oriented   Prior to hospitalization functional status: Independent   Current cognitive status: Alert/Oriented   Current Functional Status: Independent   Able to Return to Prior Arrangements yes   Is patient able to care for self after discharge? Yes   Patient's perception of discharge disposition home or selfcare   Readmission Within The Last 30 Days previous discharge plan unsuccessful   If yes, most recent facility name: [facility]   Patient currently being followed by outpatient case management? No   Patient currently receives any other outside agency services? No   Do you have any problems affording any of your prescribed medications? No   Is the patient taking medications as prescribed? yes   Does the patient have transportation home? Yes   Transportation Available family or friend will provide   Does the patient receive services at the [facility]? No   Discharge Plan A Home   Discharge Plan B Home   Patient/Family In Agreement With Plan yes

## 2018-04-29 NOTE — EICU
eICU Note :    Called by the [facility] eRN:    Problem: Heparin 10PM dose to be held or not, creat 2.7 and last dose given at 5:30PM    Pertinent History and labs reviewed :73 y/o F With history of CHF, hypertension , CAD admitted with hypertensive emergency with CKD stage 3       Treatment /Intervention given:Hold 10PM Heparin dose.        Autumn Yee M.D  eICU Physician

## 2018-04-29 NOTE — ASSESSMENT & PLAN NOTE
The patient is not a candidate for TAVR based on severity of valvular disease  Continue to try and maximize medical management

## 2018-04-29 NOTE — HPI
This is a 72 year old female with suspected renal artery stenosis, hyperaldosteronism, CHF, HTN, CKD, stage IV and frequent hospitalizations for suspected flash pulmonary edema secondary to hypertensive emergencies presents with complaints of shortness of breath, difficulty breathing, and chest pain.  Upon arrival to the ED, the patient had a elevated BNP of 4697 and troponin I 0.21.  Systolic BP was up to the 230s systolic.

## 2018-04-29 NOTE — SUBJECTIVE & OBJECTIVE
Interval History: The patient has less sob with exertion and stable with supplemental oxygen off Bipap    Review of Systems   Respiratory: Positive for shortness of breath.      Objective:     Vital Signs (Most Recent):  Temp: 98 °F (36.7 °C) (04/29/18 0715)  Pulse: 66 (04/29/18 0902)  Resp: (!) 24 (04/29/18 0902)  BP: (!) 158/71 (04/29/18 0902)  SpO2: 99 % (04/29/18 0902) Vital Signs (24h Range):  Temp:  [98 °F (36.7 °C)-98.3 °F (36.8 °C)] 98 °F (36.7 °C)  Pulse:  [62-78] 66  Resp:  [14-29] 24  SpO2:  [95 %-100 %] 99 %  BP: (124-250)/() 158/71     Weight: 56.5 kg (124 lb 9 oz)  Body mass index is 20.73 kg/m².    Intake/Output Summary (Last 24 hours) at 04/29/18 0956  Last data filed at 04/29/18 0800   Gross per 24 hour   Intake              100 ml   Output             1645 ml   Net            -1545 ml      Physical Exam   Constitutional: She is oriented to person, place, and time. She appears well-developed and well-nourished.   Thin, frail elderly female, pleasant in NAD    HENT:   Head: Normocephalic and atraumatic.   Eyes: Conjunctivae are normal. Pupils are equal, round, and reactive to light.   Neck: Neck supple. JVD present. No thyromegaly present.   Cardiovascular: Normal rate, regular rhythm and intact distal pulses.  Exam reveals no gallop and no friction rub.    Murmur heard.   Crescendo systolic murmur is present with a grade of 3/6   Pulmonary/Chest: Breath sounds normal.   Patient on continuous Bipap    Abdominal: Soft. Bowel sounds are normal. She exhibits no distension. There is no tenderness. There is no guarding.   Musculoskeletal: Normal range of motion. She exhibits edema.   1+ pitting edema of lower extremities bilaterally   Lymphadenopathy:     She has no cervical adenopathy.   Neurological: She is alert and oriented to person, place, and time. She has normal reflexes.   Skin: Skin is warm and dry. No rash noted. No erythema. No pallor.   Psychiatric: She has a normal mood and affect. Her  behavior is normal. Judgment and thought content normal.       Significant Labs:   CBC:   Recent Labs  Lab 04/28/18  0936   WBC 5.41   HGB 8.6*   HCT 27.1*        CMP:   Recent Labs  Lab 04/28/18  0936 04/29/18  0400    136   K 4.4 4.3    102   CO2 21* 21*   GLU 95 53*   BUN 55* 60*   CREATININE 2.7* 2.7*   CALCIUM 8.9 8.7   PROT 7.1  --    ALBUMIN 3.1* 2.6*   BILITOT 0.2  --    ALKPHOS 95  --    AST 39  --    ALT 19  --    ANIONGAP 13 13   EGFRNONAA 17* 17*     Magnesium:   Recent Labs  Lab 04/29/18  0400   MG 1.8       Significant Imaging: I have reviewed and interpreted all pertinent imaging results/findings within the past 24 hours.

## 2018-04-29 NOTE — PLAN OF CARE
Problem: Patient Care Overview  Goal: Plan of Care Review  Outcome: Ongoing (interventions implemented as appropriate)  Ms. Arnold rested well overnight, VSS, NAD. Afebrile. Pain well controlled via current PRN regimen. Alternated bipap and nasal cannula; denies SOB @ present time. Urine output adequate via gambino. Pt repositions self in bed. Up to date with POC.

## 2018-04-30 PROBLEM — I50.31 ACUTE DIASTOLIC HEART FAILURE: Status: ACTIVE | Noted: 2018-04-30

## 2018-04-30 PROBLEM — I50.31 ACUTE DIASTOLIC CONGESTIVE HEART FAILURE: Status: ACTIVE | Noted: 2018-04-28

## 2018-04-30 LAB
ANION GAP SERPL CALC-SCNC: 13 MMOL/L
BUN SERPL-MCNC: 55 MG/DL
CALCIUM SERPL-MCNC: 8.5 MG/DL
CHLORIDE SERPL-SCNC: 102 MMOL/L
CO2 SERPL-SCNC: 22 MMOL/L
CREAT SERPL-MCNC: 2.7 MG/DL
EST. GFR  (AFRICAN AMERICAN): 20 ML/MIN/1.73 M^2
EST. GFR  (NON AFRICAN AMERICAN): 17 ML/MIN/1.73 M^2
GLUCOSE SERPL-MCNC: 71 MG/DL
MAGNESIUM SERPL-MCNC: 1.7 MG/DL
PHOSPHATE SERPL-MCNC: 4.6 MG/DL
POTASSIUM SERPL-SCNC: 4.2 MMOL/L
SODIUM SERPL-SCNC: 137 MMOL/L

## 2018-04-30 PROCEDURE — 84100 ASSAY OF PHOSPHORUS: CPT

## 2018-04-30 PROCEDURE — 25000003 PHARM REV CODE 250: Performed by: NURSE PRACTITIONER

## 2018-04-30 PROCEDURE — 94761 N-INVAS EAR/PLS OXIMETRY MLT: CPT

## 2018-04-30 PROCEDURE — 80048 BASIC METABOLIC PNL TOTAL CA: CPT

## 2018-04-30 PROCEDURE — 83520 IMMUNOASSAY QUANT NOS NONAB: CPT

## 2018-04-30 PROCEDURE — 83735 ASSAY OF MAGNESIUM: CPT

## 2018-04-30 PROCEDURE — 63600175 PHARM REV CODE 636 W HCPCS: Performed by: HOSPITALIST

## 2018-04-30 PROCEDURE — 25000003 PHARM REV CODE 250: Performed by: INTERNAL MEDICINE

## 2018-04-30 PROCEDURE — 94660 CPAP INITIATION&MGMT: CPT

## 2018-04-30 PROCEDURE — 97165 OT EVAL LOW COMPLEX 30 MIN: CPT

## 2018-04-30 PROCEDURE — 99900035 HC TECH TIME PER 15 MIN (STAT)

## 2018-04-30 PROCEDURE — 11000001 HC ACUTE MED/SURG PRIVATE ROOM

## 2018-04-30 PROCEDURE — 99233 SBSQ HOSP IP/OBS HIGH 50: CPT | Mod: ,,, | Performed by: HOSPITALIST

## 2018-04-30 PROCEDURE — 25000003 PHARM REV CODE 250: Performed by: HOSPITALIST

## 2018-04-30 PROCEDURE — 36415 COLL VENOUS BLD VENIPUNCTURE: CPT

## 2018-04-30 PROCEDURE — 97802 MEDICAL NUTRITION INDIV IN: CPT

## 2018-04-30 RX ORDER — FUROSEMIDE 40 MG/1
40 TABLET ORAL 2 TIMES DAILY
Status: DISCONTINUED | OUTPATIENT
Start: 2018-04-30 | End: 2018-04-30

## 2018-04-30 RX ORDER — NIFEDIPINE 30 MG/1
60 TABLET, EXTENDED RELEASE ORAL 2 TIMES DAILY
Status: DISCONTINUED | OUTPATIENT
Start: 2018-04-30 | End: 2018-05-03 | Stop reason: HOSPADM

## 2018-04-30 RX ORDER — ISOSORBIDE MONONITRATE 30 MG/1
60 TABLET, EXTENDED RELEASE ORAL DAILY
Status: DISCONTINUED | OUTPATIENT
Start: 2018-04-30 | End: 2018-05-01

## 2018-04-30 RX ORDER — ENOXAPARIN SODIUM 100 MG/ML
30 INJECTION SUBCUTANEOUS EVERY 24 HOURS
Status: DISCONTINUED | OUTPATIENT
Start: 2018-04-30 | End: 2018-05-03 | Stop reason: HOSPADM

## 2018-04-30 RX ORDER — METOLAZONE 5 MG/1
10 TABLET ORAL ONCE
Status: COMPLETED | OUTPATIENT
Start: 2018-04-30 | End: 2018-04-30

## 2018-04-30 RX ORDER — FUROSEMIDE 40 MG/1
80 TABLET ORAL 2 TIMES DAILY
Status: DISCONTINUED | OUTPATIENT
Start: 2018-04-30 | End: 2018-05-03 | Stop reason: HOSPADM

## 2018-04-30 RX ADMIN — CARVEDILOL 25 MG: 12.5 TABLET, FILM COATED ORAL at 08:04

## 2018-04-30 RX ADMIN — LATANOPROST 1 DROP: 50 SOLUTION OPHTHALMIC at 08:04

## 2018-04-30 RX ADMIN — SODIUM BICARBONATE 650 MG TABLET 650 MG: at 08:04

## 2018-04-30 RX ADMIN — HYDROXYCHLOROQUINE SULFATE 200 MG: 200 TABLET, FILM COATED ORAL at 08:04

## 2018-04-30 RX ADMIN — BUTALBITAL, ACETAMINOPHEN AND CAFFEINE 1 TABLET: 50; 325; 40 TABLET ORAL at 06:04

## 2018-04-30 RX ADMIN — FUROSEMIDE 80 MG: 40 TABLET ORAL at 05:04

## 2018-04-30 RX ADMIN — LEVOTHYROXINE SODIUM 75 MCG: 75 TABLET ORAL at 05:04

## 2018-04-30 RX ADMIN — ENOXAPARIN SODIUM 30 MG: 100 INJECTION SUBCUTANEOUS at 05:04

## 2018-04-30 RX ADMIN — HYDRALAZINE HYDROCHLORIDE 100 MG: 25 TABLET, FILM COATED ORAL at 09:04

## 2018-04-30 RX ADMIN — ASPIRIN 81 MG: 81 TABLET, COATED ORAL at 08:04

## 2018-04-30 RX ADMIN — FUROSEMIDE 80 MG: 40 TABLET ORAL at 08:04

## 2018-04-30 RX ADMIN — HYDRALAZINE HYDROCHLORIDE 100 MG: 25 TABLET, FILM COATED ORAL at 08:04

## 2018-04-30 RX ADMIN — METOLAZONE 10 MG: 5 TABLET ORAL at 08:04

## 2018-04-30 RX ADMIN — SODIUM BICARBONATE 650 MG TABLET 650 MG: at 09:04

## 2018-04-30 RX ADMIN — HEPARIN SODIUM 5000 UNITS: 5000 INJECTION, SOLUTION INTRAVENOUS; SUBCUTANEOUS at 05:04

## 2018-04-30 RX ADMIN — ISOSORBIDE MONONITRATE 60 MG: 30 TABLET, EXTENDED RELEASE ORAL at 08:04

## 2018-04-30 RX ADMIN — NIFEDIPINE 60 MG: 30 TABLET, FILM COATED, EXTENDED RELEASE ORAL at 04:04

## 2018-04-30 RX ADMIN — NIFEDIPINE 60 MG: 30 TABLET, FILM COATED, EXTENDED RELEASE ORAL at 08:04

## 2018-04-30 RX ADMIN — CITALOPRAM HYDROBROMIDE 30 MG: 20 TABLET ORAL at 08:04

## 2018-04-30 RX ADMIN — ROSUVASTATIN CALCIUM 10 MG: 10 TABLET, FILM COATED ORAL at 09:04

## 2018-04-30 NOTE — PLAN OF CARE
Problem: Patient Care Overview  Goal: Plan of Care Review  Outcome: Ongoing (interventions implemented as appropriate)  Mrs. Arnold rested well overnight, VSS, NAD. Denies pain or discomfort. O2 sats decreased to 91% while asleep, 1LNC applied. Pt denies SOB. Ambulates to toilet and voids without difficulty. 1 BM overnight. Elevated BP this AM reported to Dr. Fallon, new orders to modify nifedipine received; see MAR. Will continue to monitor BP. Pt gave herself partial bath last PM, linens changed. Repositions in bed independently. Up to date with POC.

## 2018-04-30 NOTE — CONSULTS
[facility]  Adult Nutrition  Consult Note    SUMMARY     Recommendations    Recommendation/Intervention:   1. Continue renal diet   2. Recommend novasource renal supplement 1x/d if pt continues with poor PO intake    Goals:   1. Meet >85% EEN and EPN   2. Prevent further wt loss   3. Promote nutrition related labs wnl    Nutrition Goal Status: new  Communication of NICHO Recs: reviewed with RN    Reason for Assessment    Reason for Assessment: consult  Diagnosis:   1. Acute systolic congestive heart failure    2. Shortness of breath    3. Flash pulmonary edema    4. Acute respiratory distress    5. Hypertensive emergency    6. Anemia, unspecified type    7. Elevated troponin    8. Acute diastolic congestive heart failure      Past Medical History:   Diagnosis Date    Acute hypoxemic respiratory failure 4/28/2018    Acute on chronic diastolic congestive heart failure 11/17/2017    Allergy     Anatomical narrow angle glaucoma     Anemia     States diagnosed about a month ago    Aortic atherosclerosis     Arthritis     Cataract     CHF (congestive heart failure)     Chronic kidney disease     Chronic sciatica of left side     Right lower back pain due to sciatica    Coronary artery disease     Depression     Dry eyes     GERD (gastroesophageal reflux disease)     History of colon cancer     Hyperaldosteronism     Hyperlipidemia     Hypertension     Hypothyroidism     Left ventricular diastolic dysfunction with preserved systolic function     Lupus (systemic lupus erythematosus) 8/17/2012    Malnutrition 5/16/2017    Osteoarthritis of cervical spine 8/17/2012    Osteopenia     PAD (peripheral artery disease)     FRAN (renal artery stenosis)      Interdisciplinary Rounds: attended  General Information Comments: Consult received for pt with poor appetite with recent wt loss. Pt reports fair appetite, does note some wt loss PTA but unable to quantify. Per chart, pt has had wt  "fluctuations, likely 2/2 fluid status. Nutrition focused physical assessment performed, pt has moderate muscle depletion of scapula and moderate fat depletion of triceps.   Nutrition Discharge Planning: d/c on renal diet    Nutrition Risk Screen    Nutrition Risk Screen: no indicators present    Nutrition/Diet History    Patient Reported Diet/Restrictions/Preferences: lactose restricted  Do you have any cultural, spiritual, Confucianism conflicts, given your current situation?: n/a    Anthropometrics    Temp: 97.7 °F (36.5 °C)  Height Method: Stated  Height: 5' 5" (165.1 cm)  Height (inches): 65 in  Weight Method: Bed Scale  Weight: 52.9 kg (116 lb 10 oz)  Weight (lb): 116.62 lb  Ideal Body Weight (IBW), Female: 125 lb  % Ideal Body Weight, Female (lb): 93.3 lb  BMI (Calculated): 19.4  BMI Grade: 18.5-24.9 - normal    Lab/Procedures/Meds    Pertinent Labs Reviewed: reviewed  Lab Results   Component Value Date     04/30/2018    K 4.2 04/30/2018     04/30/2018    CO2 22 (L) 04/30/2018    BUN 55 (H) 04/30/2018    CREATININE 2.7 (H) 04/30/2018    CALCIUM 8.5 (L) 04/30/2018    PHOS 4.6 (H) 04/30/2018    MG 1.7 04/30/2018    ESTGFRAFRICA 20 (A) 04/30/2018    EGFRNONAA 17 (A) 04/30/2018    ALBUMIN 2.6 (L) 04/29/2018     Pertinent Medications Reviewed: reviewed  Scheduled Meds:   aspirin  81 mg Oral Daily    carvedilol  25 mg Oral BID    citalopram  30 mg Oral Daily    furosemide  80 mg Oral BID    heparin (porcine)  5,000 Units Subcutaneous Q8H    hydrALAZINE  100 mg Oral Q12H    hydroxychloroquine  200 mg Oral BID    isosorbide mononitrate  60 mg Oral Daily    latanoprost  1 drop Both Eyes Daily    levothyroxine  75 mcg Oral Before breakfast    NIFEdipine  60 mg Oral BID    rosuvastatin  10 mg Oral QHS    sodium bicarbonate  1 tablet Oral BID     Physical Findings/Assessment    Overall Physical Appearance: loss of muscle mass, loss of subcutaneous fat  Oral/Mouth Cavity: tooth/teeth missing  Skin: " intact    Estimated/Assessed Needs    Weight Used For Calorie Calculations: 52.9 kg (116 lb 10 oz)  Energy Calorie Requirements (kcal): 2208-7894  Energy Need Method: Kcal/kg (25-30 kcal/kg)  Protein Requirements: 53-64 gm/d (1-1.2 gm/kg)  Weight Used For Protein Calculations: 52.9 kg (116 lb 10 oz)  Fluid Requirements (mL): 1325 (or per team)  Fluid Need Method: RDA Method  RDA Method (mL): 1325     Nutrition Prescription Ordered    Current Diet Order: renal    Evaluation of Received Nutrient/Fluid Intake     % Intake of Estimated Energy Needs: 50 - 75 %  % Meal Intake: 50 - 75 %    Nutrition Risk    Level of Risk/Frequency of Follow-up: low     Assessment and Plan    Nutrition Problem  Inadequate oral intake    Related to (etiology):   Decreased appetite    Signs and Symptoms (as evidenced by):   Pt report of decreased appetite and wt loss pta     Interventions/Recommendations (treatment strategy):  See recs above    Nutrition Diagnosis Status:   New    Monitor and Evaluation    Food and Nutrient Intake: energy intake, food and beverage intake  Food and Nutrient Adminstration: diet order  Physical Activity and Function: nutrition-related ADLs and IADLs  Anthropometric Measurements: weight, weight change  Biochemical Data, Medical Tests and Procedures: electrolyte and renal panel, gastrointestinal profile, glucose/endocrine profile, inflammatory profile, lipid profile  Nutrition-Focused Physical Findings: overall appearance, extremities, muscles and bones, skin     Nutrition Follow-Up    RD Follow-up?: Yes

## 2018-04-30 NOTE — PLAN OF CARE
Problem: Occupational Therapy Goal  Goal: Occupational Therapy Goal  OT eval completed. Pt close to her baseline, therefore no further acute OT needs warranted at this time.     Mady June, OTR/L  4/30/2018

## 2018-04-30 NOTE — PT/OT/SLP EVAL
"Occupational Therapy   Evaluation and Discharge Note    Name: Ewelina Arnold  MRN: [id number]  Admitting Diagnosis:  Acute diastolic congestive heart failure      Recommendations:     Discharge Recommendations: home  Discharge Equipment Recommendations:  shower chair  Barriers to discharge:  None    History:     Occupational Profile:  Per OT report: Pt reports she lives in a single story home with 7 KHOI and bilateral handrails that are close together. She reports her grandson is staying with her temporarily and will probably stay there for the next month to month and a half. She has a tub/shower combo with grab bars and an elevated toilet.   Previous level of function: Pt reports being (I) with all ADL's and uses a rolling walker for functional mobility around her house and in the community.  Pt's grandson works at night and is home during the day usually sleeping. Pt's daughter provides most of her transportation, delivers groceries, and cooks and cleans for her.   Roles and Routines: Pt reports prior to "not feeling well" in January she was driving short distances, going to the gym ([employer]) and doing classes there and usually watches TV during the day.  Equipment Owned:  walker, rolling  Assistance upon Discharge: Pt will have her daughter and grandson to assist as necessary.     Past Medical History:   Diagnosis Date    Acute hypoxemic respiratory failure 4/28/2018    Acute on chronic diastolic congestive heart failure 11/17/2017    Allergy     Anatomical narrow angle glaucoma     Anemia     States diagnosed about a month ago    Aortic atherosclerosis     Arthritis     Cataract     CHF (congestive heart failure)     Chronic kidney disease     Chronic sciatica of left side     Right lower back pain due to sciatica    Coronary artery disease     Depression     Dry eyes     GERD (gastroesophageal reflux disease)     History of colon cancer     Hyperaldosteronism     Hyperlipidemia     " "Hypertension     Hypothyroidism     Left ventricular diastolic dysfunction with preserved systolic function     Lupus (systemic lupus erythematosus) 8/17/2012    Malnutrition 5/16/2017    Osteoarthritis of cervical spine 8/17/2012    Osteopenia     PAD (peripheral artery disease)     FRAN (renal artery stenosis)        Past Surgical History:   Procedure Laterality Date    CARDIAC CATHETERIZATION  07/27/2011    x1    CHOLECYSTECTOMY  1999    COLON SURGERY  2000 & 2003    partial removal    COLONOSCOPY N/A 4/14/2016    Procedure: COLONOSCOPY;  Surgeon: Jose Steen MD;  Location: [facility] ENDO (4TH FLR);  Service: Endoscopy;  Laterality: N/A;  prep 2 days prior light meals only/clear liquid day before  and 4 dulcolax tabs (5 mgs) at noon    COLONOSCOPY N/A 9/14/2017    Procedure: COLONOSCOPY;  Surgeon: Jose Steen MD;  [facility]  Service: Endoscopy;  Laterality: N/A;    EYE SURGERY      HAND SURGERY Bilateral 1996 & 1997    due to carpal tunnel     HERNIA REPAIR      HYSTERECTOMY  1983    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    OOPHORECTOMY      removed one    Peripheral Iridotomy both eyes  1994    laser angle correction    THYROIDECTOMY, PARTIAL  2006    to remove two nodules and one-half thyroid       Subjective     Chief Complaint: "I just want them to figure out what's going on. This is my fourth time here since January."  Patient/Family stated goals: return home   Communicated with: RN prior to session.  Pain/Comfort:  · Pain Rating 1: 0/10  · Pain Rating Post-Intervention 1: 0/10    Patients cultural, spiritual, Buddhism conflicts given the current situation: none stated    Objective:     Patient found with: peripheral IV    General Precautions: Standard, fall   Orthopedic Precautions:N/A   Braces: N/A     Occupational Performance:    Bed Mobility:    · Patient completed Scooting/Bridging with modified independence  · Patient completed Supine to Sit with modified " independence  · Patient completed Sit to Supine with modified independence    Functional Mobility/Transfers:  · Patient completed Sit <> Stand Transfer with supervision  with  no assistive device   · Functional Mobility: house-hold level using no AD with SPV; no LOB or safety concerns observed    Activities of Daily Living:  · Grooming: modified independence standing at sink  · UB Dressing: modified independence to don gown like robe  · LB Dressing: modified independence to doff/don socks using crossed leg technique seated at EOB    Cognitive/Visual Perceptual:  Cognitive/Psychosocial Skills:     -       Oriented to: Person, Place, Time and Situation   -       Follows Commands/attention:Follows multistep  commands  -       Communication: clear/fluent  -       Safety awareness/insight to disability: intact   -       Mood/Affect/Coping skills/emotional control: Appropriate to situation  Visual/Perceptual:      -Intact    Physical Exam:  Balance:    -       indepedent - mod (I) for sitting balanc; mod (I) for static standing and SPV for dynamic standing balance  Postural examination/scapula alignment:    -       No postural abnormalities identified  Skin integrity: Visible skin intact  Edema:  Moderate shai feet  Sensation:    -       Intact  Motor Planning:    -       WFL  Upper Extremity Range of Motion:     -       Right Upper Extremity: WNL  -       Left Upper Extremity: WNL  Upper Extremity Strength:    -       Right Upper Extremity: WFL  -       Left Upper Extremity: WFL   Strength:    -       Right Upper Extremity: WFL  -       Left Upper Extremity: WFL  Fine Motor Coordination:    -       Intact  Gross motor coordination:   WFL    Patient left HOB elevated with all lines intact, call button in reach, RN notified and family present    [facility] 6 Click:  AMPA Total Score: 24    Treatment & Education:  Educated on OT role and POC. Discussed energy conservation techniques- using shower chair for bathing, having  "chair available in kitchen and bathroom if feeling tired completing those tasks in standing, pursed lip breathing, planning her day in advance to allow for rest breaks, etc.     Pt with c/o SOB during functional mobility, however none observed by therapist. Pt's SPO2 on RA was 99%.   Education:    Assessment:     Ewelina Arnold is a 72 y.o. female with a medical diagnosis of Acute diastolic congestive heart failure. At this time, patient is functioning at their prior level of function and does not require further acute OT services.     Clinical Decision Makin.  OT Low:  "Pt evaluation falls under low complexity for evaluation coding due to performance deficits noted in 1-3 areas as stated above and 0 co-morbities affecting current functional status. Data obtained from problem focused assessments. No modifications or assistance was required for completion of evaluation. Only brief occupational profile and history review completed."     Plan:     During this hospitalization, patient does not require further acute OT services.  Please re-consult if situation changes.    · Plan of Care Reviewed with: patient    This Plan of care has been discussed with the patient who was involved in its development and understands and is in agreement with the identified goals and treatment plan    GOALS:    Occupational Therapy Goals        Problem: Occupational Therapy Goal    Goal Priority Disciplines Outcome Interventions   Occupational Therapy Goal     OT, PT/OT                     Time Tracking:     OT Date of Treatment: 18  OT Start Time: 1537  OT Stop Time: 1600  OT Total Time (min): 23 min    Billable Minutes:Evaluation 23    Mady June OTR/L  2018    "

## 2018-04-30 NOTE — PLAN OF CARE
Problem: Patient Care Overview  Goal: Plan of Care Review  Outcome: Ongoing (interventions implemented as appropriate)   04/30/18 1501   Coping/Psychosocial   Plan Of Care Reviewed With patient       Problem: Breathing Pattern Ineffective (Adult)  Goal: Identify Related Risk Factors and Signs and Symptoms  Related risk factors and signs and symptoms are identified upon initiation of Human Response Clinical Practice Guideline (CPG)   Outcome: Ongoing (interventions implemented as appropriate)   04/30/18 1501   Breathing Pattern Ineffective   Related Risk Factors (Breathing Pattern Ineffective) underlying condition   Signs and Symptoms (Breathing Pattern Ineffective) activity intolerance

## 2018-04-30 NOTE — PLAN OF CARE
Problem: Patient Care Overview  Goal: Plan of Care Review  Outcome: Ongoing (interventions implemented as appropriate)  Recommendation/Intervention:   1. Continue renal diet   2. Recommend novasource renal supplement 1x/d if pt continues with poor PO intake     Goals:   1. Meet >85% EEN and EPN   2. Prevent further wt loss   3. Promote nutrition related labs wnl

## 2018-04-30 NOTE — ASSESSMENT & PLAN NOTE
Appreciate Nephrology evaluation and recs  Renal status unchanged  Continue to monitor electrolytes

## 2018-04-30 NOTE — PROGRESS NOTES
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 She developed lupus in 1994. Over the last year accelerated hypertension and rise in BUN/crea. On 1/18/2018 she had a Renal Duplex that did not reveal any increased velocity in the right renal artery but possibly dampened velocities more distally. She had a repeat Renal Duplex on 3/20/2018 that reveled a similar peak velocity in the right renal artery of 0.64 m/s. On 4/4/2018 she had a third U/S of the renal artery that revealed a peak velocity 0.79 m/s. I was then asked to see her for the possibility of FRAN and advised against angiography as I felt FRAN was unlikely.      She was seen as an outpatient by her PCP on 4/19/2018 and then had a blood pressure of 140/60 mmHg. She became increasingly short of breath beginning on about 4/26/2018. She present with a blood pressure of 259/123 in respiratory distress to the ER and is in pulmonary edema. No acute ST-T wave changes. She is admitted to the ICU. Her breathing is much improved after diuresis and better control of her hypertension.    Hospital Course:    Diuresis.    Adjustment of antihypertensives.    Interval History:    Comfortable at rest. Denies dyspnea. No orthopnea.    Review of Systems   Constitution: Positive for weakness and malaise/fatigue. Negative for chills and fever.   HENT: Negative for nosebleeds.    Eyes: Negative for vision loss in left eye and vision loss in right eye.   Cardiovascular: Positive for leg swelling. Negative for chest pain, orthopnea,  palpitations and paroxysmal nocturnal dyspnea.   Respiratory: Negative for cough, hemoptysis, shortness of breath, sputum production and wheezing.    Hematologic/Lymphatic: Negative for bleeding problem.   Skin: Negative for rash.   Musculoskeletal: Negative for myalgias.   Gastrointestinal: Negative for abdominal pain, heartburn, hematemesis, hematochezia, jaundice, melena, nausea and vomiting.   Genitourinary: Negative for hematuria.   Neurological: Negative for dizziness, headaches, light-headedness and vertigo.   Psychiatric/Behavioral: Negative for altered mental status. The patient is not nervous/anxious.    Allergic/Immunologic: Negative for persistent infections.     Objective:     Vital Signs (Most Recent):  Temp: 98.6 °F (37 °C) (04/30/18 0715)  Pulse: 70 (04/30/18 0800)  Resp: 18 (04/30/18 0800)  BP: (!) 186/84 (04/30/18 0800)  SpO2: 97 % (04/30/18 0800) Vital Signs (24h Range):  Temp:  [97.9 °F (36.6 °C)-98.6 °F (37 °C)] 98.6 °F (37 °C)  Pulse:  [60-76] 70  Resp:  [13-42] 18  SpO2:  [92 %-100 %] 97 %  BP: (129-216)/(63-96) 186/84     Weight: 52.9 kg (116 lb 10 oz)  Body mass index is 19.41 kg/m².    SpO2: 97 %  O2 Device (Oxygen Therapy): nasal cannula      Intake/Output Summary (Last 24 hours) at 04/30/18 0948  Last data filed at 04/30/18 0800   Gross per 24 hour   Intake              418 ml   Output             1160 ml   Net             -742 ml       Lines/Drains/Airways     Peripheral Intravenous Line                 Peripheral IV - Single Lumen 04/28/18 0908 Left Wrist 2 days         Peripheral IV - Single Lumen 04/28/18 1801 Right Wrist 1 day                Physical Exam   Constitutional: She is oriented to person, place, and time. She appears well-developed and well-nourished.  Non-toxic appearance. She does not appear ill. No distress.   Eyes: Right conjunctiva is not injected. Right conjunctiva has no hemorrhage. Left conjunctiva is not injected. Left conjunctiva has no hemorrhage.   Neck: No JVD  present.   Cardiovascular: Normal rate, regular rhythm, S1 normal and S2 normal.  Exam reveals no gallop.    Murmur heard.   Harsh midsystolic murmur is present with a grade of 3/6  at the upper right sternal border radiating to the neck  Pulmonary/Chest: Effort normal and breath sounds normal.   Abdominal: Normal appearance. There is no tenderness.   Musculoskeletal:        Right ankle: She exhibits swelling.        Left ankle: She exhibits swelling.   Neurological: She is alert and oriented to person, place, and time. She is not disoriented.   Skin: Skin is warm and dry. No rash noted.   Psychiatric: She has a normal mood and affect. Her speech is normal and behavior is normal. Cognition and memory are normal.     Current Medications:     aspirin  81 mg Oral Daily    carvedilol  25 mg Oral BID    citalopram  30 mg Oral Daily    furosemide  80 mg Oral BID    heparin (porcine)  5,000 Units Subcutaneous Q8H    hydrALAZINE  100 mg Oral Q12H    hydroxychloroquine  200 mg Oral BID    isosorbide mononitrate  60 mg Oral Daily    latanoprost  1 drop Both Eyes Daily    levothyroxine  75 mcg Oral Before breakfast    NIFEdipine  60 mg Oral BID    rosuvastatin  10 mg Oral QHS    sodium bicarbonate  1 tablet Oral BID     Current Laboratory Results:    Recent Results (from the past 24 hour(s))   Basic Metabolic Panel (BMP)    Collection Time: 04/30/18  3:21 AM   Result Value Ref Range    Sodium 137 136 - 145 mmol/L    Potassium 4.2 3.5 - 5.1 mmol/L    Chloride 102 95 - 110 mmol/L    CO2 22 (L) 23 - 29 mmol/L    Glucose 71 70 - 110 mg/dL    BUN, Bld 55 (H) 8 - 23 mg/dL    Creatinine 2.7 (H) 0.5 - 1.4 mg/dL    Calcium 8.5 (L) 8.7 - 10.5 mg/dL    Anion Gap 13 8 - 16 mmol/L    eGFR if African American 20 (A) >60 mL/min/1.73 m^2    eGFR if non African American 17 (A) >60 mL/min/1.73 m^2   Magnesium    Collection Time: 04/30/18  3:21 AM   Result Value Ref Range    Magnesium 1.7 1.6 - 2.6 mg/dL   Phosphorus    Collection  Time: 04/30/18  3:21 AM   Result Value Ref Range    Phosphorus 4.6 (H) 2.7 - 4.5 mg/dL     Current Imaging Results:    Imaging Results          X-Ray Chest AP Portable (Final result)  Result time 04/28/18 09:55:58    Final result by Jyotsna Beaver MD (04/28/18 09:55:58)                 Impression:      No adverse interval change.      Electronically signed by: Jyotsna Beaver MD  Date:    04/28/2018  Time:    09:55             Narrative:    EXAMINATION:  XR CHEST AP PORTABLE    CLINICAL HISTORY:  CHF;    TECHNIQUE:  Single frontal view of the chest was performed.    COMPARISON:  04/14/2018    FINDINGS:  As compared to the previous study of 04/14/2018, there has been no significant change in the cardiopulmonary findings.                                  Assessment and Plan:     Problem List:    Active Diagnoses:    Diagnosis Date Noted POA    PRINCIPAL PROBLEM:  Acute diastolic congestive heart failure [I50.31] 04/28/2018 Yes    Acute diastolic heart failure [I50.31] 04/30/2018 Yes    Acute hypoxemic respiratory failure [J96.01] 04/28/2018 Yes    Flash pulmonary edema [J81.0] 04/12/2018 Yes    Nonrheumatic aortic valve stenosis [I35.0] 11/18/2017 Yes    Anemia associated with chronic renal failure [D63.1] 07/25/2017 Yes    Pulmonary hypertension [I27.20] 05/16/2017 Yes    CKD (chronic kidney disease), stage IV [N18.4] 01/23/2017 Yes    Acquired hypothyroidism [E03.9] 05/13/2015 Yes    Coronary artery disease due to calcified coronary lesion [I25.10, I25.84] 10/05/2012 Yes    Resistant hypertension [I10] 10/05/2012 Yes    Other forms of systemic lupus erythematosus [M32.8] 08/17/2012 Yes     Chronic      Problems Resolved During this Admission:    Diagnosis Date Noted Date Resolved POA     Assessment and Plan:     1. Chronic Kidney Disease, Stage 4              4/2/2018: BUN/crea 47/2.6. CrCl 20.              4/29/2018: BUN/crea 60/2.7. CrCl 17.              Appears to be major issue.     2. Heart  Failure, Diastolic, Acute on Chronic              4/2/2018: Normal left ventricular size with low normal to mildly depressed systolic function. EF 50-55%. Severe LVH. Moderate diastolic dysfunction. Moderate to severe AS - 3.3 m/s - 0.9 cm2. SPAP 52 mmHg. Trivial pericardial effusion.               Probably low flow moderate to severe AS with normal EF.              Doubt AS much of a cause for her condition but diastolic dysfunction advanced.              4/28/2018: BNP 4,697.              Diuresis.     3. Hypertension              Severe and accelerated.              1/18/2018: U/S Renal: Right Renal Artery: Poorly visualized ostium. Peak velocity 0.64 m/s.               3/20/2018: U/S Renal: Right Renal Artery: Peak velocity 0.62 m/s.              4/4/2018: U/S Renal: Right Renal Artery: Peak velocity 0.79 m/s.               That the above three studies do not reveal any increased velocity in the right renal artery makes significant renal artery stenosis as a cause for her severe hypertension and rise in BUN/crea unlikely.              I would not favor angiography.   No ACEI, ARB or spironolactone due to advanced CKD.    No clonidine diue to side effects.              At Home: On carvedilol 25 mg Q12, hydralazine 100 mg Q12, isosorbide mononitrate 30 mg Q24, nifedipine 90 mg Q24 and furosemide 40 mg Q24.    Currently: On carvedilol 25 mg Q12, hydralazine 100 mg Q12, isosorbide mononitrate 60 mg Q24, nifedipine 60 mg Q12 and furosemide 80 mg Q12.   Could cosider doxazosin.    4. Hypercholesterolemia              On rosuvastatin 10 mg Q24.      VTE Risk Mitigation         Ordered     heparin (porcine) injection 5,000 Units  Every 8 hours      04/28/18 1228     IP VTE HIGH RISK PATIENT  Once      04/28/18 1228     Place sequential compression device  Until discontinued      04/28/18 1228          Risa Lea MD  Cardiology  [facility]

## 2018-04-30 NOTE — PROGRESS NOTES
[facility] Hospital Medicine  Progress Note    Patient Name: Ewelina Arnold  MRN: [id number]  Patient Class: IP- Inpatient   Admission Date: 4/28/2018  Length of Stay: 2 days  Attending Physician: Loretta Anthony MD  Primary Care Provider: Kalie Walton MD        Subjective:     Principal Problem:Acute diastolic congestive heart failure    HPI:  This is a 72 year old female with suspected renal artery stenosis, hyperaldosteronism, CHF, HTN, CKD, stage IV and frequent hospitalizations for suspected flash pulmonary edema secondary to hypertensive emergencies presents with complaints of shortness of breath, difficulty breathing, and chest pain.  Upon arrival to the ED, the patient had a elevated BNP of 4697 and troponin I 0.21.  Systolic BP was up to the 230s systolic.      Hospital Course:  The patient is received IV Lasix 40mg Q8 and has diuresed 1.5L.  Bipap has been discontinued and the patient has a very stable respiratory status.  Nephrology consulted and started po Lasix 80mg BID and Metolazone.  The patient has difficulty with minimal exertion and PT/OT will assist patient in assessing ADLs and discharge planning.    Interval History: The patient still has complaints of SOB    Review of Systems   Respiratory: Positive for shortness of breath.      Objective:     Vital Signs (Most Recent):  Temp: 97.7 °F (36.5 °C) (04/30/18 1105)  Pulse: 64 (04/30/18 1125)  Resp: (!) 21 (04/30/18 1125)  BP: (!) 165/74 (04/30/18 1125)  SpO2: 96 % (04/30/18 1125) Vital Signs (24h Range):  Temp:  [97.7 °F (36.5 °C)-98.6 °F (37 °C)] 97.7 °F (36.5 °C)  Pulse:  [60-76] 64  Resp:  [13-42] 21  SpO2:  [92 %-100 %] 96 %  BP: (129-216)/(63-96) 165/74     Weight: 52.9 kg (116 lb 10 oz)  Body mass index is 19.41 kg/m².    Intake/Output Summary (Last 24 hours) at 04/30/18 1245  Last data filed at 04/30/18 0800   Gross per 24 hour   Intake              100 ml   Output              990 ml   Net             -890 ml       Physical Exam   Constitutional: She is oriented to person, place, and time. She appears well-developed and well-nourished.   Thin, frail elderly female, pleasant in NAD    HENT:   Head: Normocephalic and atraumatic.   Eyes: Conjunctivae are normal. Pupils are equal, round, and reactive to light.   Neck: Neck supple. No thyromegaly present.   Cardiovascular: Normal rate, regular rhythm and intact distal pulses.  Exam reveals no gallop and no friction rub.    Murmur heard.   Crescendo systolic murmur is present with a grade of 3/6   Pulmonary/Chest: Breath sounds normal.   Abdominal: Soft. Bowel sounds are normal. She exhibits no distension. There is no tenderness. There is no guarding.   Musculoskeletal: Normal range of motion. She exhibits edema.   1+ pitting edema of lower extremities bilaterally   Lymphadenopathy:     She has no cervical adenopathy.   Neurological: She is alert and oriented to person, place, and time. She has normal reflexes.   Skin: Skin is warm and dry. No rash noted. No erythema. No pallor.   Psychiatric: She has a normal mood and affect. Her behavior is normal. Judgment and thought content normal.     Significant Labs:   CMP:   Recent Labs  Lab 04/29/18  0400 04/30/18  0321    137   K 4.3 4.2    102   CO2 21* 22*   GLU 53* 71   BUN 60* 55*   CREATININE 2.7* 2.7*   CALCIUM 8.7 8.5*   ALBUMIN 2.6*  --    ANIONGAP 13 13   EGFRNONAA 17* 17*     Magnesium:   Recent Labs  Lab 04/29/18  0400 04/30/18  0321   MG 1.8 1.7       Significant Imaging: I have reviewed and interpreted all pertinent imaging results/findings within the past 24 hours.    Assessment/Plan:      * Acute diastolic congestive heart failure    Nephrology to manage diuretic therapy  Improved clinical status  Strict I/Os              Flash pulmonary edema    Nephrology adjusting diuretic regimen  Supplemental oxygen           Acute hypoxemic respiratory failure    Resolved  Supplemental oxygen to maintain sats  >92%  Continuous Bipap 12/5 discontinued          Nonrheumatic aortic valve stenosis    The patient is not a candidate for TAVR based on severity of valvular disease  Continue to try and maximize medical management          Anemia associated with chronic renal failure    H/H decreased but no acute management needed          Pulmonary hypertension    No acute management needed          CKD (chronic kidney disease), stage IV    Appreciate Nephrology evaluation and recs  Renal status unchanged  Continue to monitor electrolytes          Acquired hypothyroidism    Continue usual thyroid supplementation          Resistant hypertension    Continue current anti-hypertensive regimen  BP better controlled          Coronary artery disease due to calcified coronary lesion    Mildly elevated troponin I initially and has trended downward  Doubt ACS            Other forms of systemic lupus erythematosus    No acute management needed            VTE Risk Mitigation         Ordered     heparin (porcine) injection 5,000 Units  Every 8 hours      04/28/18 1228     IP VTE HIGH RISK PATIENT  Once      04/28/18 1228     Place sequential compression device  Until discontinued      04/28/18 1228              Loretta Anthony MD  Department of Hospital Medicine   [facility]

## 2018-04-30 NOTE — NURSING TRANSFER
Nursing Transfer Note      4/30/2018     Transfer To: 310    Transfer via wheelchair    Transfer with cardiac monitoring    Transported by FLACA Lowery    Medicines sent: yes    Chart send with patient: Yes    Notified: per pt    Patient reassessed at: 4/30/2018 1305    Upon arrival to floor: cardiac monitor applied, patient oriented to room, call bell in reach and bed in lowest position

## 2018-04-30 NOTE — SUBJECTIVE & OBJECTIVE
Interval History: The patient still has complaints of SOB    Review of Systems   Respiratory: Positive for shortness of breath.      Objective:     Vital Signs (Most Recent):  Temp: 97.7 °F (36.5 °C) (04/30/18 1105)  Pulse: 64 (04/30/18 1125)  Resp: (!) 21 (04/30/18 1125)  BP: (!) 165/74 (04/30/18 1125)  SpO2: 96 % (04/30/18 1125) Vital Signs (24h Range):  Temp:  [97.7 °F (36.5 °C)-98.6 °F (37 °C)] 97.7 °F (36.5 °C)  Pulse:  [60-76] 64  Resp:  [13-42] 21  SpO2:  [92 %-100 %] 96 %  BP: (129-216)/(63-96) 165/74     Weight: 52.9 kg (116 lb 10 oz)  Body mass index is 19.41 kg/m².    Intake/Output Summary (Last 24 hours) at 04/30/18 1245  Last data filed at 04/30/18 0800   Gross per 24 hour   Intake              100 ml   Output              990 ml   Net             -890 ml      Physical Exam   Constitutional: She is oriented to person, place, and time. She appears well-developed and well-nourished.   Thin, frail elderly female, pleasant in NAD    HENT:   Head: Normocephalic and atraumatic.   Eyes: Conjunctivae are normal. Pupils are equal, round, and reactive to light.   Neck: Neck supple. No thyromegaly present.   Cardiovascular: Normal rate, regular rhythm and intact distal pulses.  Exam reveals no gallop and no friction rub.    Murmur heard.   Crescendo systolic murmur is present with a grade of 3/6   Pulmonary/Chest: Breath sounds normal.   Abdominal: Soft. Bowel sounds are normal. She exhibits no distension. There is no tenderness. There is no guarding.   Musculoskeletal: Normal range of motion. She exhibits edema.   1+ pitting edema of lower extremities bilaterally   Lymphadenopathy:     She has no cervical adenopathy.   Neurological: She is alert and oriented to person, place, and time. She has normal reflexes.   Skin: Skin is warm and dry. No rash noted. No erythema. No pallor.   Psychiatric: She has a normal mood and affect. Her behavior is normal. Judgment and thought content normal.     Significant Labs:    CMP:   Recent Labs  Lab 04/29/18  0400 04/30/18  0321    137   K 4.3 4.2    102   CO2 21* 22*   GLU 53* 71   BUN 60* 55*   CREATININE 2.7* 2.7*   CALCIUM 8.7 8.5*   ALBUMIN 2.6*  --    ANIONGAP 13 13   EGFRNONAA 17* 17*     Magnesium:   Recent Labs  Lab 04/29/18  0400 04/30/18  0321   MG 1.8 1.7       Significant Imaging: I have reviewed and interpreted all pertinent imaging results/findings within the past 24 hours.

## 2018-04-30 NOTE — PROGRESS NOTES
Progress Note  [facility]    Admit Date: 4/28/2018   LOS: 2 days     SUBJECTIVE:     Follow-up For:      Interval History:     Sitting on bedside.  C/o fullness and some SOB.  BP noted.  Renal fxn essentially stable.      Review of Systems:  Constitutional: No fever or chills  Respiratory:  shortness of breath  Cardiovascular: No chest pain or palpitations  Gastrointestinal: No nausea or vomiting  Neurological: No confusion or weakness    OBJECTIVE:     Vital Signs Range (Last 24H):  Vitals:    04/30/18 0800   BP: (!) 186/84   Pulse: 70   Resp: 18   Temp:        Temp:  [97.9 °F (36.6 °C)-98.6 °F (37 °C)]   Pulse:  [60-76]   Resp:  [13-42]   BP: (129-216)/(63-96)   SpO2:  [92 %-100 %]     I & O (Last 24H):    Intake/Output Summary (Last 24 hours) at 04/30/18 0827  Last data filed at 04/30/18 0800   Gross per 24 hour   Intake              418 ml   Output             1160 ml   Net             -742 ml       Physical Exam:  General appearance: Well developed, well nourished  Eyes:  Conjunctivae/corneas clear. PERRL.  Lungs: Normal respiratory effort,   Some faint rhonchi/crackles left lower lobe.  Heart: Regular rate and rhythm, S1, S2 normal, no murmur, rub or ruma.  Abdomen: Soft, non-tender non-distended; bowel sounds normal; no masses,  no organomegaly  Extremities: No cyanosis or clubbing. 1+ pedal  edema.  DP pulses 2+/4+ bilaterally.  Skin: Skin color, texture, turgor normal. No rashes or lesions  Neurologic: Normal strength and tone. No focal numbness or weakness     Laboratory Data:      Recent Labs  Lab 04/30/18  0321      K 4.2      CO2 22*   BUN 55*   CREATININE 2.7*   CALCIUM 8.5*   PHOS 4.6*     Lab Results   Component Value Date    .0 (H) 11/21/2016    CALCIUM 8.5 (L) 04/30/2018    PHOS 4.6 (H) 04/30/2018     Lab Results   Component Value Date    IRON 32 04/02/2018    TIBC 250 04/02/2018    FERRITIN 261 04/04/2018       Medications:  Medication list was reviewed and changes  noted under Assessment/Plan.    Diagnostic Results:    Reviewed most recent CT/US/Echo/MRI    ASSESSMENT/PLAN:       Nonoliguric MARYAM on CKD 3 with uni-nephric hypertensive heart and acute on chronic diastolic HTN/ Acc HTN (N17.9, N18.3, I13.0, I50.33): Prior renal arterial doppler with some evidence of FRAN but repeat US (x3) reviewed by Dr. Lea does not show significant stenosis last month. Patient claims compliance with Meds and diet.  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  This is being worked up again by Dr. Brizuela  -No ARB/Syd for now  -Consider scleroderma in differential for flash pulm edema but no other stigmata.  Will order anti-RNA polymerase III autoantibodies.   -trends heading towards ESRD.   -Renally dose meds, avoid nephrotoxins, and monitor I/O's closely.       Accelerated HTN XXX  (I 13.0):  -Restarted home medications and titrated  -Flash Pulmonary edema likely from hypertensive crisis but what caused the crisis?  -can't use clonidine or acei/ARB/Syd due to severe reactions.  -lasix 80 BID and dose Zaroxolyn today.       SLE (M32.9):  Continue plaquenil.  Defer. Anti-histone negative.      Heart Failure, Diastolic, Acute on Chronic              4/2/2018: Normal left ventricular size with low normal to mildly depressed systolic function. EF 50-55%. Severe LVH. Moderate diastolic dysfunction. Moderate to severe AS - 3.3 m/s - 0.9 cm2. SPAP 52 mmHg. Trivial pericardial effusion.               Probably low flow moderate to severe AS with normal EF.              Doubt AS much of a cause for her condition but diastolic dysfunction advanced.              4/28/2018: BNP 4,697.              Diuresis.      See above  Discussed with Dr. Anthony  Transfer to floor  Ambulate.

## 2018-05-01 LAB
ANION GAP SERPL CALC-SCNC: 12 MMOL/L
BNP SERPL-MCNC: 3692 PG/ML
BUN SERPL-MCNC: 54 MG/DL
CALCIUM SERPL-MCNC: 8.7 MG/DL
CHLORIDE SERPL-SCNC: 100 MMOL/L
CO2 SERPL-SCNC: 25 MMOL/L
CREAT SERPL-MCNC: 2.7 MG/DL
EST. GFR  (AFRICAN AMERICAN): 20 ML/MIN/1.73 M^2
EST. GFR  (NON AFRICAN AMERICAN): 17 ML/MIN/1.73 M^2
GLUCOSE SERPL-MCNC: 76 MG/DL
MAGNESIUM SERPL-MCNC: 1.7 MG/DL
PHOSPHATE SERPL-MCNC: 4.3 MG/DL
POTASSIUM SERPL-SCNC: 3.6 MMOL/L
RNA POLYMERASE III ANTIBODIES, IGG, SERUM: <10 U
SODIUM SERPL-SCNC: 137 MMOL/L

## 2018-05-01 PROCEDURE — 25000003 PHARM REV CODE 250: Performed by: NURSE PRACTITIONER

## 2018-05-01 PROCEDURE — 97116 GAIT TRAINING THERAPY: CPT

## 2018-05-01 PROCEDURE — 36415 COLL VENOUS BLD VENIPUNCTURE: CPT

## 2018-05-01 PROCEDURE — 25000003 PHARM REV CODE 250: Performed by: HOSPITALIST

## 2018-05-01 PROCEDURE — 94660 CPAP INITIATION&MGMT: CPT

## 2018-05-01 PROCEDURE — 99232 SBSQ HOSP IP/OBS MODERATE 35: CPT | Mod: ,,, | Performed by: INTERNAL MEDICINE

## 2018-05-01 PROCEDURE — 94799 UNLISTED PULMONARY SVC/PX: CPT

## 2018-05-01 PROCEDURE — 83735 ASSAY OF MAGNESIUM: CPT

## 2018-05-01 PROCEDURE — 83880 ASSAY OF NATRIURETIC PEPTIDE: CPT

## 2018-05-01 PROCEDURE — 25000003 PHARM REV CODE 250: Performed by: INTERNAL MEDICINE

## 2018-05-01 PROCEDURE — 97161 PT EVAL LOW COMPLEX 20 MIN: CPT

## 2018-05-01 PROCEDURE — 63600175 PHARM REV CODE 636 W HCPCS: Performed by: HOSPITALIST

## 2018-05-01 PROCEDURE — 94761 N-INVAS EAR/PLS OXIMETRY MLT: CPT

## 2018-05-01 PROCEDURE — 99900035 HC TECH TIME PER 15 MIN (STAT)

## 2018-05-01 PROCEDURE — 80048 BASIC METABOLIC PNL TOTAL CA: CPT

## 2018-05-01 PROCEDURE — 11000001 HC ACUTE MED/SURG PRIVATE ROOM

## 2018-05-01 PROCEDURE — 84100 ASSAY OF PHOSPHORUS: CPT

## 2018-05-01 RX ORDER — DOXAZOSIN 2 MG/1
2 TABLET ORAL DAILY
Status: DISCONTINUED | OUTPATIENT
Start: 2018-05-01 | End: 2018-05-01

## 2018-05-01 RX ORDER — SODIUM BICARBONATE 650 MG/1
1 TABLET ORAL DAILY
Status: DISCONTINUED | OUTPATIENT
Start: 2018-05-01 | End: 2018-05-02

## 2018-05-01 RX ORDER — ALBUTEROL SULFATE 0.83 MG/ML
2.5 SOLUTION RESPIRATORY (INHALATION) EVERY 6 HOURS PRN
Status: DISCONTINUED | OUTPATIENT
Start: 2018-05-01 | End: 2018-05-01 | Stop reason: CLARIF

## 2018-05-01 RX ORDER — ISOSORBIDE MONONITRATE 30 MG/1
120 TABLET, EXTENDED RELEASE ORAL DAILY
Status: DISCONTINUED | OUTPATIENT
Start: 2018-05-01 | End: 2018-05-02

## 2018-05-01 RX ORDER — METOLAZONE 5 MG/1
10 TABLET ORAL ONCE
Status: COMPLETED | OUTPATIENT
Start: 2018-05-01 | End: 2018-05-01

## 2018-05-01 RX ORDER — DOXAZOSIN 2 MG/1
4 TABLET ORAL DAILY
Status: DISCONTINUED | OUTPATIENT
Start: 2018-05-01 | End: 2018-05-03 | Stop reason: HOSPADM

## 2018-05-01 RX ORDER — ALBUTEROL SULFATE 0.83 MG/ML
2.5 SOLUTION RESPIRATORY (INHALATION) EVERY 6 HOURS PRN
Status: DISCONTINUED | OUTPATIENT
Start: 2018-05-01 | End: 2018-05-03 | Stop reason: HOSPADM

## 2018-05-01 RX ORDER — METOLAZONE 5 MG/1
5 TABLET ORAL DAILY
Status: DISCONTINUED | OUTPATIENT
Start: 2018-05-02 | End: 2018-05-02

## 2018-05-01 RX ADMIN — CITALOPRAM HYDROBROMIDE 30 MG: 20 TABLET ORAL at 08:05

## 2018-05-01 RX ADMIN — HYDROXYCHLOROQUINE SULFATE 200 MG: 200 TABLET, FILM COATED ORAL at 08:05

## 2018-05-01 RX ADMIN — HYDRALAZINE HYDROCHLORIDE 10 MG: 20 INJECTION INTRAMUSCULAR; INTRAVENOUS at 05:05

## 2018-05-01 RX ADMIN — CARVEDILOL 25 MG: 12.5 TABLET, FILM COATED ORAL at 08:05

## 2018-05-01 RX ADMIN — LEVOTHYROXINE SODIUM 75 MCG: 75 TABLET ORAL at 05:05

## 2018-05-01 RX ADMIN — ASPIRIN 81 MG: 81 TABLET, COATED ORAL at 08:05

## 2018-05-01 RX ADMIN — ROSUVASTATIN CALCIUM 10 MG: 10 TABLET, FILM COATED ORAL at 08:05

## 2018-05-01 RX ADMIN — ENOXAPARIN SODIUM 30 MG: 100 INJECTION SUBCUTANEOUS at 04:05

## 2018-05-01 RX ADMIN — NIFEDIPINE 60 MG: 30 TABLET, FILM COATED, EXTENDED RELEASE ORAL at 08:05

## 2018-05-01 RX ADMIN — DOXAZOSIN MESYLATE 4 MG: 2 TABLET ORAL at 08:05

## 2018-05-01 RX ADMIN — HYDRALAZINE HYDROCHLORIDE 100 MG: 25 TABLET, FILM COATED ORAL at 08:05

## 2018-05-01 RX ADMIN — FUROSEMIDE 80 MG: 40 TABLET ORAL at 08:05

## 2018-05-01 RX ADMIN — SODIUM BICARBONATE 650 MG TABLET 650 MG: at 08:05

## 2018-05-01 RX ADMIN — FUROSEMIDE 80 MG: 40 TABLET ORAL at 05:05

## 2018-05-01 RX ADMIN — ISOSORBIDE MONONITRATE 120 MG: 30 TABLET, EXTENDED RELEASE ORAL at 08:05

## 2018-05-01 RX ADMIN — LATANOPROST 1 DROP: 50 SOLUTION OPHTHALMIC at 09:05

## 2018-05-01 RX ADMIN — METOLAZONE 10 MG: 5 TABLET ORAL at 08:05

## 2018-05-01 NOTE — ASSESSMENT & PLAN NOTE
Due to uncontrolled hypertension  Nephrology adjusting diuretic regimen  Supplemental oxygen PRN

## 2018-05-01 NOTE — PLAN OF CARE
Problem: Patient Care Overview  Goal: Plan of Care Review  Outcome: Ongoing (interventions implemented as appropriate)  Patient resting in bed at this time, no injuries reported, bed locked and in lowest position, night light on, nonskid foot wear applied, call bell and other important items within reach, instructed to call as needed, purposeful rounding done and plan of care discussed, verbalized understanding       BRP  Tele on reading NSR  Peak BP during shift 205/93, last 176/76, prn hydralazine given   No pain c/o   RA, tolerating, SOB with activity   Bipap not tolerating for long (3-4hrs), HS   saline locked         No further complaints or concerns at this time  Will cont to monitor

## 2018-05-01 NOTE — PROGRESS NOTES
"[facility] Hospital Medicine  Progress Note    Patient Name: Ewelina Arnold  MRN: [id number]  Patient Class: IP- Inpatient   Admission Date: 4/28/2018  Length of Stay: 3 days  Attending Physician: Soo Petersen MD  Primary Care Provider: Kalie Walton MD        Subjective:     Principal Problem:Acute diastolic congestive heart failure    HPI:  This is a 72 year old female with suspected renal artery stenosis, hyperaldosteronism, CHF, HTN, CKD, stage IV and frequent hospitalizations for suspected flash pulmonary edema secondary to hypertensive emergencies presents with complaints of shortness of breath, difficulty breathing, and chest pain.  Upon arrival to the ED, the patient had a elevated BNP of 4697 and troponin I 0.21.  Systolic BP was up to the 230s systolic.      Hospital Course:  The patient is received IV Lasix 40mg Q8 and has diuresed 1.5L.  Bipap has been discontinued and the patient has a very stable respiratory status.  Nephrology consulted and started po Lasix 80mg BID and Metolazone.  She has difficult to control BP and slowly titrating medications for improved controlThe patient has difficulty with minimal exertion and PT/OT will assist patient in assessing ADLs and discharge planning.    Interval History:  Pt seen and examined at bedside. She reports improvement in symptoms. Worked with PT today but had some SOB and "almost" felt lightheaded. Reports her BP dropped with activity. Per PT notes, down to 98/51.     Review of Systems   Constitutional: Negative for fever.   Respiratory: Positive for shortness of breath (with activity).    Gastrointestinal: Negative for nausea and vomiting.   Neurological: Negative for dizziness, syncope and light-headedness.     Objective:     Vital Signs (Most Recent):  Temp: 98.1 °F (36.7 °C) (05/01/18 1236)  Pulse: 62 (05/01/18 1236)  Resp: 18 (05/01/18 0724)  BP: (!) 116/56 (05/01/18 1236)  SpO2: 96 % (05/01/18 1236) Vital Signs (24h " Range):  Temp:  [96.2 °F (35.7 °C)-99 °F (37.2 °C)] 98.1 °F (36.7 °C)  Pulse:  [60-74] 62  Resp:  [18-22] 18  SpO2:  [94 %-100 %] 96 %  BP: (116-222)/(56-99) 116/56     Weight: 52.9 kg (116 lb 10 oz)  Body mass index is 19.41 kg/m².    Intake/Output Summary (Last 24 hours) at 05/01/18 1335  Last data filed at 05/01/18 0500   Gross per 24 hour   Intake              200 ml   Output             1740 ml   Net            -1540 ml      Physical Exam   Constitutional: She is oriented to person, place, and time. She appears well-developed and well-nourished.   HENT:   Head: Normocephalic.   Cardiovascular: Normal rate, regular rhythm and intact distal pulses.  Exam reveals no gallop and no friction rub.    Murmur heard.   Crescendo systolic murmur is present with a grade of 3/6   Pulmonary/Chest: Breath sounds normal. She has no wheezes. She has no rales.   Abdominal: Soft. Bowel sounds are normal. She exhibits no distension. There is no tenderness. There is no guarding.   Musculoskeletal: Normal range of motion. She exhibits edema (trace, improved).   Neurological: She is alert and oriented to person, place, and time. She has normal reflexes.   Skin: Skin is warm and dry.   Psychiatric: She has a normal mood and affect. Her behavior is normal.       Significant Labs:   BMP:   Recent Labs  Lab 05/01/18  0515   GLU 76      K 3.6      CO2 25   BUN 54*   CREATININE 2.7*   CALCIUM 8.7   MG 1.7     CBC: No results for input(s): WBC, HGB, HCT, PLT in the last 48 hours.  All pertinent labs within the past 24 hours have been reviewed.    Significant Imaging: I have reviewed all pertinent imaging results/findings within the past 24 hours.    Assessment/Plan:      * Acute diastolic congestive heart failure    Nephrology to manage diuretic therapy  Improved clinical status  Strict I/Os              Acute hypoxemic respiratory failure    Resolved  Supplemental oxygen to maintain sats >92%  Continuous Bipap 12/5  discontinued          Flash pulmonary edema    Due to uncontrolled hypertension  Nephrology adjusting diuretic regimen  Supplemental oxygen PRN          Nonrheumatic aortic valve stenosis    The patient was previously not a candidate for TAVR based on severity of valvular disease  Continue to try and maximize medical management          Anemia associated with chronic renal failure    Hb remains stable. Monitor          Pulmonary hypertension    No acute management needed          CKD (chronic kidney disease), stage IV    Appreciate Nephrology evaluation and recs  Renal status unchanged  Continue to monitor electrolytes          Acquired hypothyroidism    Continue usual thyroid supplementation          Resistant hypertension    Continue current anti-hypertensive regimen  BP better controlled but dropping with activity.   Titrating BP meds as able. Monitor for symptoms           Coronary artery disease due to calcified coronary lesion    Mildly elevated troponin I initially and has trended downward  Doubt ACS            Other forms of systemic lupus erythematosus    No acute management needed            VTE Risk Mitigation         Ordered     enoxaparin injection 30 mg  Daily      04/30/18 1430     IP VTE HIGH RISK PATIENT  Once      04/28/18 1228     Place sequential compression device  Until discontinued      04/28/18 1228              Soo Petersen MD  Department of Hospital Medicine   [facility]

## 2018-05-01 NOTE — PLAN OF CARE
Problem: Patient Care Overview  Goal: Individualization & Mutuality  Outcome: Ongoing (interventions implemented as appropriate)  Pt remained free of falls/ injuries. Ambulates without assistance on RA. Up in chair most of shift with legs elevated. Bed low, side rails up X2, call light within reach. No pain throughout the day. Bed low, side rails up X2, call light within reach. Will continue to monitor.

## 2018-05-01 NOTE — SUBJECTIVE & OBJECTIVE
"Interval History:  Pt seen and examined at bedside. She reports improvement in symptoms. Worked with PT today but had some SOB and "almost" felt lightheaded. Reports her BP dropped with activity. Per PT notes, down to 98/51.     Review of Systems   Constitutional: Negative for fever.   Respiratory: Positive for shortness of breath (with activity).    Gastrointestinal: Negative for nausea and vomiting.   Neurological: Negative for dizziness, syncope and light-headedness.     Objective:     Vital Signs (Most Recent):  Temp: 98.1 °F (36.7 °C) (05/01/18 1236)  Pulse: 62 (05/01/18 1236)  Resp: 18 (05/01/18 0724)  BP: (!) 116/56 (05/01/18 1236)  SpO2: 96 % (05/01/18 1236) Vital Signs (24h Range):  Temp:  [96.2 °F (35.7 °C)-99 °F (37.2 °C)] 98.1 °F (36.7 °C)  Pulse:  [60-74] 62  Resp:  [18-22] 18  SpO2:  [94 %-100 %] 96 %  BP: (116-222)/(56-99) 116/56     Weight: 52.9 kg (116 lb 10 oz)  Body mass index is 19.41 kg/m².    Intake/Output Summary (Last 24 hours) at 05/01/18 1335  Last data filed at 05/01/18 0500   Gross per 24 hour   Intake              200 ml   Output             1740 ml   Net            -1540 ml      Physical Exam   Constitutional: She is oriented to person, place, and time. She appears well-developed and well-nourished.   HENT:   Head: Normocephalic.   Cardiovascular: Normal rate, regular rhythm and intact distal pulses.  Exam reveals no gallop and no friction rub.    Murmur heard.   Crescendo systolic murmur is present with a grade of 3/6   Pulmonary/Chest: Breath sounds normal. She has no wheezes. She has no rales.   Abdominal: Soft. Bowel sounds are normal. She exhibits no distension. There is no tenderness. There is no guarding.   Musculoskeletal: Normal range of motion. She exhibits edema (trace, improved).   Neurological: She is alert and oriented to person, place, and time. She has normal reflexes.   Skin: Skin is warm and dry.   Psychiatric: She has a normal mood and affect. Her behavior is normal. "       Significant Labs:   BMP:   Recent Labs  Lab 05/01/18  0515   GLU 76      K 3.6      CO2 25   BUN 54*   CREATININE 2.7*   CALCIUM 8.7   MG 1.7     CBC: No results for input(s): WBC, HGB, HCT, PLT in the last 48 hours.  All pertinent labs within the past 24 hours have been reviewed.    Significant Imaging: I have reviewed all pertinent imaging results/findings within the past 24 hours.

## 2018-05-01 NOTE — PLAN OF CARE
Problem: NPPV/CPAP (Adult)  Goal: Signs and Symptoms of Listed Potential Problems Will be Absent, Minimized or Managed (NPPV/CPAP)  Signs and symptoms of listed potential problems will be absent, minimized or managed by discharge/transition of care (reference NPPV/CPAP (Adult) CPG).  Outcome: Ongoing (interventions implemented as appropriate)  Patient a bit confused about the Bipap mask but tolerating well at this time.

## 2018-05-01 NOTE — ASSESSMENT & PLAN NOTE
The patient was previously not a candidate for TAVR based on severity of valvular disease  Continue to try and maximize medical management

## 2018-05-01 NOTE — ASSESSMENT & PLAN NOTE
Continue current anti-hypertensive regimen  BP better controlled but dropping with activity.   Titrating BP meds as able. Monitor for symptoms

## 2018-05-01 NOTE — PROGRESS NOTES
Progress Note  [facility]    Admit Date: 4/28/2018   LOS: 3 days     SUBJECTIVE:     Follow-up For:      Interval History:     Feels better but still with HTN.  Diuresed well and less edema.  SOB/fullness improved.        Review of Systems:  Constitutional: No fever or chills  Respiratory:  shortness of breath  Cardiovascular: No chest pain or palpitations  Gastrointestinal: No nausea or vomiting  Neurological: No confusion or weakness    OBJECTIVE:     Vital Signs Range (Last 24H):  Vitals:    05/01/18 0724   BP: (!) 179/74   Pulse: 72   Resp: 18   Temp: 96.2 °F (35.7 °C)       Temp:  [96.2 °F (35.7 °C)-99 °F (37.2 °C)]   Pulse:  [62-74]   Resp:  [18-46]   BP: (165-222)/(72-99)   SpO2:  [94 %-100 %]     I & O (Last 24H):    Intake/Output Summary (Last 24 hours) at 05/01/18 0839  Last data filed at 05/01/18 0500   Gross per 24 hour   Intake              200 ml   Output             2240 ml   Net            -2040 ml       Physical Exam:  General appearance: Well developed, well nourished  Eyes:  Conjunctivae/corneas clear. PERRL.  Lungs: Normal respiratory effort,   crackles left lower lobe.  Heart: Regular rate and rhythm, S1, S2 normal, no murmur, rub or ruma.  Abdomen: Soft, non-tender non-distended; bowel sounds normal; no masses,  no organomegaly  Extremities: No cyanosis or clubbing. 1+ pedal  edema.  DP pulses 2+/4+ bilaterally.  Skin: Skin color, texture, turgor normal. No rashes or lesions  Neurologic: Normal strength and tone. No focal numbness or weakness     Laboratory Data:      Recent Labs  Lab 05/01/18  0515      K 3.6      CO2 25   BUN 54*   CREATININE 2.7*   CALCIUM 8.7   PHOS 4.3     Lab Results   Component Value Date    .0 (H) 11/21/2016    CALCIUM 8.7 05/01/2018    PHOS 4.3 05/01/2018     Lab Results   Component Value Date    IRON 32 04/02/2018    TIBC 250 04/02/2018    FERRITIN 261 04/04/2018       Medications:  Medication list was reviewed and changes noted under  Assessment/Plan.    Diagnostic Results:    Reviewed most recent CT/US/Echo/MRI    ASSESSMENT/PLAN:       Stagnant Nonoliguric MARYAM on CKD 3 with uni-nephric hypertensive heart and acute on chronic diastolic HTN/ Acc HTN (N17.9, N18.3, I13.0, I50.33): Prior renal arterial doppler with some evidence of FRAN but repeat US (x3) reviewed by Dr. Lea does not show significant stenosis.   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  This is being worked up again by Dr. Brizuela  -No ARB/ACE/[place] for now  -Considering scleroderma in differential for flash pulm edema but no other stigmata.  Ordered anti-RNA polymerase III autoantibodies.   -trends heading towards ESRD.   -Renally dose meds, avoid nephrotoxins, and monitor I/O's closely.       Accelerated HTN XXX  (I 13.0):  -Restarted home medications and titrated  -Flash Pulmonary edema likely from hypertensive crisis but what caused the crisis?  -can't use clonidine or acei/ARB/Syd due to severe reactions.  -lasix 80 BID and redose Zaroxolyn today since had great response.  -start Cardura as additive agent.  Only other option will be Minoxidil.         SLE (M32.9):  Continue plaquenil.  Defer. Anti-histone negative.      Heart Failure, Diastolic, Acute on Chronic              4/2/2018: Normal left ventricular size with low normal to mildly depressed systolic function. EF 50-55%. Severe LVH. Moderate  diastolic dysfunction. Moderate to severe AS - 3.3 m/s - 0.9 cm2. SPAP 52 mmHg. Trivial pericardial effusion.               Probably low flow moderate to severe AS with normal EF.              Doubt AS much of a cause for her condition but diastolic dysfunction advanced.              4/28/2018: BNP 4,697.              Diuresis.   She was evaluated by Dr. Rob on 11/4/17 and was determined to be moderate surgical risk due to  comorbidities.  She has  had eval for TAVR by Dr. Ly at [facility] on 11/27/2017.  She was found to have moderate AS and severe diastolic dysfunction  with pulm HTN.  Have reached out to both for more opinions.         See above  Home Wednesday? if BP improved.

## 2018-05-01 NOTE — PT/OT/SLP EVAL
"Physical Therapy Evaluation and Treatment    Patient Name:  Ewelina Arnold   MRN:  [id number]    Recommendations:     Discharge Recommendations:  home with home health, home health PT   Discharge Equipment Recommendations: none   Barriers to discharge: Decreased caregiver support    Assessment:     Ewelina Arnold is a 72 y.o. female admitted with a medical diagnosis of Acute diastolic congestive heart failure.  She presents with the following impairments/functional limitations:  impaired endurance, impaired cardiopulmonary response to activity. PT evaluation completed. Pt with generalized weakness and mildly symptomatic hypotension with activity (BP meds given earlier due to hypertension).     Rehab Prognosis:  Good; patient would benefit from acute skilled PT services to address these deficits and reach maximum level of function.      Recent Surgery: * No surgery found *      Plan:     During this hospitalization, patient to be seen 3 x/week to address the above listed problems via gait training, therapeutic activities, therapeutic exercises, neuromuscular re-education  · Plan of Care Expires:  05/31/18   Plan of Care Reviewed with: patient    Subjective     Communicated with RN prior to session.  Patient found Supine upon PT entry to room, agreeable to evaluation.      Chief Complaint: "I get short of breath at home when I'm walking around"  Patient comments/goals: to return to PLOF  Pain/Comfort:  · Pain Rating 1: 0/10  · Pain Rating Post-Intervention 1: 0/10    Patients cultural, spiritual, [employer] conflicts given the current situation: none stated    Living Environment:  Pt reports she lives in a single story home with 7 KHOI and bilateral handrails that are close together. She has a tub/shower combo with grab bars and an elevated toilet.   Previous level of function: Pt reports being (I) with all ADL's and (I) with mobility, but gets SOB with community mobility.  Pt's daughter provides most of her transportation, " "delivers groceries, and cooks and cleans for her.   Roles and Routines: Pt reports prior to "not feeling well" about a month ago she was driving short distances, going to the gym ([employer]) and doing classes there and usually watches TV during the day.  Equipment Owned:  rollator  Assistance upon Discharge: Pt will have her daughter and grandson to assist as necessary.     Objective:     Patient found with: peripheral IV     General Precautions: Standard, fall   Orthopedic Precautions:N/A   Braces: N/A     Exams:  · Cognition: Patient is oriented to Person, Place, Time and Situation and follows approximately 100% of one step commands.    · Posture:    · -       Rounded shoulders  · Sensation: Intact to light touch bilateral LEs.   · Skin Integrity: Bruising of L lateral thigh area, per patient report "I fell a couple months ago"  · Edema: Pitting to dorsum of bilateral feet 3+  · Coordination: No coordination impairments identified with functional mobility. No formal testing performed.   · LE ROM/Strength: BLE WFL  · Tone: No tone impairments identified during functional mobility.   · Vital signs: SpO2: 95% on RA; Heart rate 64 bpm  · BP seated: 133/60; after activity: 98/51; after seated rest break > 2 min 124/58    Functional Mobility:  · Bed Mobility:     · Supine to Sit: supervision with HOB elevated  · Transfers:     · Sit to Stand:  contact guard assistance with no AD x 2 from bedside chair x 1 from EOB, pt required multiple attempts prior to standing completion   · Gait: 1 x 200' without AD and CGA. pt required x 1 standing rest break secondary to reported SOB and weakness symptoms    AM-PAC 6 CLICK MOBILITY  Total Score:19     Patient left up in chair with all lines intact, call button in reach and RN notified.    GOALS:    Physical Therapy Goals        Problem: Physical Therapy Goal    Goal Priority Disciplines Outcome Goal Variances Interventions   Physical Therapy Goal     PT/OT, PT Ongoing " (interventions implemented as appropriate)     Description:  Goals to be met by: 18     Patient will increase functional independence with mobility by performin. Supine to sit with supervision.   2. Sit to supine with supervision.   3. Sit<>stand transfer with supervision using rolling walker.   4. Gait > 150 feet with SBA using rolling walker.   5. Ascend/descend 7 stairs with bilateral handrails with CGA with or without AD.                    History:     Past Medical History:   Diagnosis Date    Acute hypoxemic respiratory failure 2018    Acute on chronic diastolic congestive heart failure 2017    Allergy     Anatomical narrow angle glaucoma     Anemia     States diagnosed about a month ago    Aortic atherosclerosis     Arthritis     Cataract     CHF (congestive heart failure)     Chronic kidney disease     Chronic sciatica of left side     Right lower back pain due to sciatica    Coronary artery disease     Depression     Dry eyes     GERD (gastroesophageal reflux disease)     History of colon cancer     Hyperaldosteronism     Hyperlipidemia     Hypertension     Hypothyroidism     Left ventricular diastolic dysfunction with preserved systolic function     Lupus (systemic lupus erythematosus) 2012    Malnutrition 2017    Osteoarthritis of cervical spine 2012    Osteopenia     PAD (peripheral artery disease)     FRAN (renal artery stenosis)        Past Surgical History:   Procedure Laterality Date    CARDIAC CATHETERIZATION  07/27/2011    x1    CHOLECYSTECTOMY      COLON SURGERY   &     partial removal    COLONOSCOPY N/A 2016    Procedure: COLONOSCOPY;  Surgeon: Jose Steen MD;  Location: [facility] ([address] [place] [address]);  Service: Endoscopy;  Laterality: N/A;  prep 2 days prior light meals only/clear liquid day before  and 4 dulcolax tabs (5 mgs) at noon    COLONOSCOPY N/A 2017    Procedure: COLONOSCOPY;  Surgeon: Jose Steen MD;   Location: [facility] (4TH FLR);  Service: Endoscopy;  Laterality: N/A;    EYE SURGERY      HAND SURGERY Bilateral 1996 & 1997    due to carpal tunnel     HERNIA REPAIR      HYSTERECTOMY  1983    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    OOPHORECTOMY      removed one    Peripheral Iridotomy both eyes  1994    laser angle correction    THYROIDECTOMY, PARTIAL  2006    to remove two nodules and one-half thyroid       Clinical Decision Making:     History  Co-morbidities and personal factors that may impact the plan of care Examination  Body Structures and Functions, activity limitations and participation restrictions that may impact the plan of care Clinical Presentation   Decision Making/ Complexity Score   Co-morbidities:   [] Time since onset of injury / illness / exacerbation  [] Status of current condition  []Patient's cognitive status and safety concerns    [] Multiple Medical Problems (see med hx)  Personal Factors:   [] Patient's age  [] Prior Level of function   [] Patient's home situation (environment and family support)  [] Patient's level of motivation  [] Expected progression of patient      HISTORY:(criteria)    [] 57276 - no personal factors/history    [] 79847 - has 1-2 personal factor/comorbidity     [] 21296 - has >3 personal factor/comorbidity     Body Regions:  [] Objective examination findings  [] Head     []  Neck  [] Trunk   [] Upper Extremity  [] Lower Extremity    Body Systems:  [] For communication ability, affect, cognition, language, and learning style: the assessment of the ability to make needs known, consciousness, orientation (person, place, and time), expected emotional /behavioral responses, and learning preferences (eg, learning barriers, education  needs)  [] For the neuromuscular system: a general assessment of gross coordinated movement (eg, balance, gait, locomotion, transfers, and transitions) and motor function  (motor control and motor learning)  [] For the  musculoskeletal system: the assessment of gross symmetry, gross range of motion, gross strength, height, and weight  [] For the integumentary system: the assessment of pliability(texture), presence of scar formation, skin color, and skin integrity  [] For cardiovascular/pulmonary system: the assessment of heart rate, respiratory rate, blood pressure, and edema     Activity limitations:    [] Patient's cognitive status and saf ety concerns          [] Status of current condition      [] Weight bearing restriction  [] Cardiopulmunary Restriction    Participation Restrictions:   [] Goals and goal agreement with the patient     [] Rehab potential (prognosis) and probable outcome      Examination of Body System: (criteria)    [] 61747 - addressing 1-2 elements    [] 91281 - addressing a total of 3 or more elements     [] 75758 -  Addressing a total of 4 or more elements         Clinical Presentation: (criteria)  Choose one     On examination of body system using standardized tests and measures patient presents with (CHOOSE ONE) elements from any of the following: body structures and functions, activity limitations, and/or participation restrictions.  Leading to a clinical presentation that is considered (CHOOSE ONE)                              Clinical Decision Making  (Eval Complexity):  Choose One     Time Tracking:     PT Received On: 05/01/18  PT Start Time: 0930     PT Stop Time: 1004  PT Total Time (min): 34 min     Billable Minutes: Evaluation 20 and Gait Training 14    Ary Overton, PT, DPT  05/01/2018

## 2018-05-01 NOTE — PLAN OF CARE
Problem: Patient Care Overview  Goal: Plan of Care Review  Outcome: Ongoing (interventions implemented as appropriate)  Pt on RA. Sats 95%. No distress noted. BIPAP at bedside for PRN use. Will continue to monitor.    1638: Pt instructed on IS.

## 2018-05-01 NOTE — PROGRESS NOTES
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 She developed lupus in 1994. Over the last year accelerated hypertension and rise in BUN/crea. On 1/18/2018 she had a Renal Duplex that did not reveal any increased velocity in the right renal artery but possibly dampened velocities more distally. She had a repeat Renal Duplex on 3/20/2018 that reveled a similar peak velocity in the right renal artery of 0.64 m/s. On 4/4/2018 she had a third U/S of the renal artery that revealed a peak velocity 0.79 m/s. I was then asked to see her for the possibility of FRAN and advised against angiography as I felt FRAN was unlikely.      She was seen as an outpatient by her PCP on 4/19/2018 and then had a blood pressure of 140/60 mmHg. She became increasingly short of breath beginning on about 4/26/2018. She present with a blood pressure of 259/123 in respiratory distress to the ER and is in pulmonary edema. No acute ST-T wave changes. She is admitted to the ICU. Her breathing is much improved after diuresis and better control of her hypertension.    Hospital Course:    Diuresis.    Adjustment of antihypertensives.    Interval History:    Comfortable at rest. Denies dyspnea. No orthopnea. Less edema of ankles.    Review of Systems   Constitution: Positive for weakness and malaise/fatigue. Negative for chills and fever.   HENT: Negative for nosebleeds.    Eyes: Negative for vision loss in left eye and vision loss in right eye.   Cardiovascular: Positive for leg swelling. Negative for  chest pain, orthopnea, palpitations and paroxysmal nocturnal dyspnea.   Respiratory: Negative for cough, hemoptysis, shortness of breath, sputum production and wheezing.    Hematologic/Lymphatic: Negative for bleeding problem.   Skin: Negative for rash.   Musculoskeletal: Negative for myalgias.   Gastrointestinal: Negative for abdominal pain, heartburn, hematemesis, hematochezia, jaundice, melena, nausea and vomiting.   Genitourinary: Negative for hematuria.   Neurological: Negative for dizziness, headaches, light-headedness and vertigo.   Psychiatric/Behavioral: Negative for altered mental status. The patient is not nervous/anxious.    Allergic/Immunologic: Negative for persistent infections.     Objective:     Vital Signs (Most Recent):  Temp: 96.2 °F (35.7 °C) (05/01/18 0724)  Pulse: 74 (05/01/18 0800)  Resp: 18 (05/01/18 0724)  BP: (!) 179/74 (05/01/18 0724)  SpO2: 96 % (05/01/18 0724) Vital Signs (24h Range):  Temp:  [96.2 °F (35.7 °C)-99 °F (37.2 °C)] 96.2 °F (35.7 °C)  Pulse:  [62-74] 74  Resp:  [18-46] 18  SpO2:  [94 %-100 %] 96 %  BP: (165-222)/(72-99) 179/74     Weight: 52.9 kg (116 lb 10 oz)  Body mass index is 19.41 kg/m².    SpO2: 96 %  O2 Device (Oxygen Therapy): room air      Intake/Output Summary (Last 24 hours) at 05/01/18 0944  Last data filed at 05/01/18 0500   Gross per 24 hour   Intake              200 ml   Output             2240 ml   Net            -2040 ml       Lines/Drains/Airways     Peripheral Intravenous Line                 Peripheral IV - Single Lumen 04/28/18 0908 Left Wrist 3 days         Peripheral IV - Single Lumen 04/28/18 1801 Right Wrist 2 days                Physical Exam   Constitutional: She is oriented to person, place, and time. She appears well-developed and well-nourished.  Non-toxic appearance. She does not appear ill. No distress.   Eyes: Right conjunctiva is not injected. Right conjunctiva has no hemorrhage. Left conjunctiva is not injected. Left conjunctiva has no  hemorrhage.   Neck: No JVD present.   Cardiovascular: Normal rate, regular rhythm, S1 normal and S2 normal.  Exam reveals no gallop.    Murmur heard.   Harsh midsystolic murmur is present with a grade of 3/6  at the upper right sternal border radiating to the neck  Pulmonary/Chest: Effort normal and breath sounds normal.   Abdominal: Normal appearance. There is no tenderness.   Musculoskeletal:        Right ankle: She exhibits swelling.        Left ankle: She exhibits swelling.   Neurological: She is alert and oriented to person, place, and time. She is not disoriented.   Skin: Skin is warm and dry. No rash noted.   Psychiatric: She has a normal mood and affect. Her speech is normal and behavior is normal. Cognition and memory are normal.     Current Medications:     aspirin  81 mg Oral Daily    carvedilol  25 mg Oral BID    citalopram  30 mg Oral Daily    doxazosin  4 mg Oral Daily    enoxaparin  30 mg Subcutaneous Daily    furosemide  80 mg Oral BID    hydrALAZINE  100 mg Oral Q12H    hydroxychloroquine  200 mg Oral BID    isosorbide mononitrate  120 mg Oral Daily    latanoprost  1 drop Both Eyes Daily    levothyroxine  75 mcg Oral Before breakfast    NIFEdipine  60 mg Oral BID    rosuvastatin  10 mg Oral QHS    sodium bicarbonate  1 tablet Oral Daily     Current Laboratory Results:    Recent Results (from the past 24 hour(s))   Basic Metabolic Panel (BMP)    Collection Time: 05/01/18  5:15 AM   Result Value Ref Range    Sodium 137 136 - 145 mmol/L    Potassium 3.6 3.5 - 5.1 mmol/L    Chloride 100 95 - 110 mmol/L    CO2 25 23 - 29 mmol/L    Glucose 76 70 - 110 mg/dL    BUN, Bld 54 (H) 8 - 23 mg/dL    Creatinine 2.7 (H) 0.5 - 1.4 mg/dL    Calcium 8.7 8.7 - 10.5 mg/dL    Anion Gap 12 8 - 16 mmol/L    eGFR if African American 20 (A) >60 mL/min/1.73 m^2    eGFR if non African American 17 (A) >60 mL/min/1.73 m^2   Magnesium    Collection Time: 05/01/18  5:15 AM   Result Value Ref Range    Magnesium 1.7  1.6 - 2.6 mg/dL   Phosphorus    Collection Time: 05/01/18  5:15 AM   Result Value Ref Range    Phosphorus 4.3 2.7 - 4.5 mg/dL   Brain natriuretic peptide    Collection Time: 05/01/18  5:31 AM   Result Value Ref Range    BNP 3,692 (H) 0 - 99 pg/mL     Current Imaging Results:    Imaging Results          X-Ray Chest AP Portable (Final result)  Result time 04/28/18 09:55:58    Final result by Jyotsna Beaver MD (04/28/18 09:55:58)                 Impression:      No adverse interval change.      Electronically signed by: Jyotsna Beaver MD  Date:    04/28/2018  Time:    09:55             Narrative:    EXAMINATION:  XR CHEST AP PORTABLE    CLINICAL HISTORY:  CHF;    TECHNIQUE:  Single frontal view of the chest was performed.    COMPARISON:  04/14/2018    FINDINGS:  As compared to the previous study of 04/14/2018, there has been no significant change in the cardiopulmonary findings.                                  Assessment and Plan:     Problem List:    Active Diagnoses:    Diagnosis Date Noted POA    PRINCIPAL PROBLEM:  Acute diastolic congestive heart failure [I50.31] 04/28/2018 Yes    Acute hypoxemic respiratory failure [J96.01] 04/28/2018 Yes    Flash pulmonary edema [J81.0] 04/12/2018 Yes    Nonrheumatic aortic valve stenosis [I35.0] 11/18/2017 Yes    Anemia associated with chronic renal failure [D63.1] 07/25/2017 Yes    Pulmonary hypertension [I27.20] 05/16/2017 Yes    CKD (chronic kidney disease), stage IV [N18.4] 01/23/2017 Yes    Acquired hypothyroidism [E03.9] 05/13/2015 Yes    Coronary artery disease due to calcified coronary lesion [I25.10, I25.84] 10/05/2012 Yes    Resistant hypertension [I10] 10/05/2012 Yes    Other forms of systemic lupus erythematosus [M32.8] 08/17/2012 Yes     Chronic      Problems Resolved During this Admission:    Diagnosis Date Noted Date Resolved POA     Assessment and Plan:     1. Chronic Kidney Disease, Stage 4              4/2/2018: BUN/crea 47/2.6. CrCl 20.               4/29/2018: BUN/crea 60/2.7. CrCl 17.              Appears to be major issue.     2. Heart Failure, Diastolic, Acute on Chronic              4/2/2018: Normal left ventricular size with low normal to mildly depressed systolic function. EF 50-55%. Severe LVH. Moderate diastolic dysfunction. Moderate to severe AS - 3.3 m/s - 0.9 cm2. SPAP 52 mmHg. Trivial pericardial effusion.               Probably low flow moderate to severe AS with normal EF.              Doubt AS much of a cause for her condition but diastolic dysfunction advanced.              4/28/2018: BNP 4,697.              Diuresis.   Improving.     3. Hypertension              Severe and accelerated.              1/18/2018: U/S Renal: Right Renal Artery: Poorly visualized ostium. Peak velocity 0.64 m/s.               3/20/2018: U/S Renal: Right Renal Artery: Peak velocity 0.62 m/s.              4/4/2018: U/S Renal: Right Renal Artery: Peak velocity 0.79 m/s.               That the above three studies do not reveal any increased velocity in the right renal artery makes significant renal artery stenosis as a cause for her severe hypertension and rise in BUN/crea unlikely.              I would not favor angiography.   No ACEI, ARB or spironolactone due to advanced CKD.    No clonidine diue to side effects.              At Home: On carvedilol 25 mg Q12, hydralazine 100 mg Q12, isosorbide mononitrate 30 mg Q24, nifedipine 90 mg Q24 and furosemide 40 mg Q24.    Currently: On carvedilol 25 mg Q12, hydralazine 100 mg Q12, isosorbide mononitrate 60 mg Q24, nifedipine 60 mg Q12, doxazosin 4 mg Q24 and furosemide 80 mg Q12.   Should come down with diuresis.    4. Hypercholesterolemia              On rosuvastatin 10 mg Q24.      VTE Risk Mitigation         Ordered     enoxaparin injection 30 mg  Daily      04/30/18 1430     IP VTE HIGH RISK PATIENT  Once      04/28/18 1228     Place sequential compression device  Until discontinued      04/28/18 1228          Risa  MD Leonora  Cardiology  [facility]

## 2018-05-01 NOTE — PLAN OF CARE
Met with patient at bedside to discuss discharge disposition. Patient only voiced a need for someone to clean her house & drive her around. Sitter list provided to patient

## 2018-05-01 NOTE — PLAN OF CARE
Problem: Physical Therapy Goal  Goal: Physical Therapy Goal  Goals to be met by: 18     Patient will increase functional independence with mobility by performin. Supine to sit with supervision.   2. Sit to supine with supervision.   3. Sit<>stand transfer with supervision using rolling walker.   4. Gait > 150 feet with SBA using rolling walker.   5. Ascend/descend 7 stairs with bilateral handrails with CGA with or without AD.  Outcome: Ongoing (interventions implemented as appropriate)  PT evaluation completed. Please see progress note for details, POC, and recommendations.

## 2018-05-02 ENCOUNTER — OUTPATIENT CASE MANAGEMENT (OUTPATIENT)
Dept: ADMINISTRATIVE | Facility: OTHER | Age: 73
End: 2018-05-02

## 2018-05-02 LAB
ANION GAP SERPL CALC-SCNC: 10 MMOL/L
BUN SERPL-MCNC: 54 MG/DL
CALCIUM SERPL-MCNC: 8.4 MG/DL
CHLORIDE SERPL-SCNC: 99 MMOL/L
CO2 SERPL-SCNC: 27 MMOL/L
CREAT SERPL-MCNC: 3.3 MG/DL
EST. GFR  (AFRICAN AMERICAN): 15 ML/MIN/1.73 M^2
EST. GFR  (NON AFRICAN AMERICAN): 13 ML/MIN/1.73 M^2
GLUCOSE SERPL-MCNC: 74 MG/DL
MAGNESIUM SERPL-MCNC: 1.6 MG/DL
PHOSPHATE SERPL-MCNC: 4.3 MG/DL
POTASSIUM SERPL-SCNC: 3.7 MMOL/L
SODIUM SERPL-SCNC: 136 MMOL/L

## 2018-05-02 PROCEDURE — 94799 UNLISTED PULMONARY SVC/PX: CPT

## 2018-05-02 PROCEDURE — 99232 SBSQ HOSP IP/OBS MODERATE 35: CPT | Mod: ,,, | Performed by: INTERNAL MEDICINE

## 2018-05-02 PROCEDURE — 25000003 PHARM REV CODE 250: Performed by: INTERNAL MEDICINE

## 2018-05-02 PROCEDURE — 99900035 HC TECH TIME PER 15 MIN (STAT)

## 2018-05-02 PROCEDURE — 36415 COLL VENOUS BLD VENIPUNCTURE: CPT

## 2018-05-02 PROCEDURE — 97530 THERAPEUTIC ACTIVITIES: CPT

## 2018-05-02 PROCEDURE — 80048 BASIC METABOLIC PNL TOTAL CA: CPT

## 2018-05-02 PROCEDURE — 11000001 HC ACUTE MED/SURG PRIVATE ROOM

## 2018-05-02 PROCEDURE — 94761 N-INVAS EAR/PLS OXIMETRY MLT: CPT

## 2018-05-02 PROCEDURE — 94660 CPAP INITIATION&MGMT: CPT

## 2018-05-02 PROCEDURE — 84100 ASSAY OF PHOSPHORUS: CPT

## 2018-05-02 PROCEDURE — 25000003 PHARM REV CODE 250: Performed by: HOSPITALIST

## 2018-05-02 PROCEDURE — 83735 ASSAY OF MAGNESIUM: CPT

## 2018-05-02 PROCEDURE — 63600175 PHARM REV CODE 636 W HCPCS: Performed by: HOSPITALIST

## 2018-05-02 PROCEDURE — 25000003 PHARM REV CODE 250: Performed by: NURSE PRACTITIONER

## 2018-05-02 RX ORDER — ISOSORBIDE MONONITRATE 30 MG/1
60 TABLET, EXTENDED RELEASE ORAL DAILY
Status: DISCONTINUED | OUTPATIENT
Start: 2018-05-03 | End: 2018-05-02

## 2018-05-02 RX ORDER — ISOSORBIDE MONONITRATE 30 MG/1
60 TABLET, EXTENDED RELEASE ORAL EVERY EVENING
Status: DISCONTINUED | OUTPATIENT
Start: 2018-05-03 | End: 2018-05-03 | Stop reason: HOSPADM

## 2018-05-02 RX ADMIN — HYDRALAZINE HYDROCHLORIDE 100 MG: 25 TABLET, FILM COATED ORAL at 08:05

## 2018-05-02 RX ADMIN — ROSUVASTATIN CALCIUM 10 MG: 10 TABLET, FILM COATED ORAL at 08:05

## 2018-05-02 RX ADMIN — SODIUM BICARBONATE 650 MG TABLET 650 MG: at 08:05

## 2018-05-02 RX ADMIN — METOLAZONE 5 MG: 5 TABLET ORAL at 08:05

## 2018-05-02 RX ADMIN — LATANOPROST 1 DROP: 50 SOLUTION OPHTHALMIC at 08:05

## 2018-05-02 RX ADMIN — ASPIRIN 81 MG: 81 TABLET, COATED ORAL at 08:05

## 2018-05-02 RX ADMIN — LEVOTHYROXINE SODIUM 75 MCG: 75 TABLET ORAL at 06:05

## 2018-05-02 RX ADMIN — HYDROXYCHLOROQUINE SULFATE 200 MG: 200 TABLET, FILM COATED ORAL at 08:05

## 2018-05-02 RX ADMIN — ISOSORBIDE MONONITRATE 120 MG: 30 TABLET, EXTENDED RELEASE ORAL at 08:05

## 2018-05-02 RX ADMIN — CARVEDILOL 25 MG: 12.5 TABLET, FILM COATED ORAL at 08:05

## 2018-05-02 RX ADMIN — FUROSEMIDE 80 MG: 40 TABLET ORAL at 08:05

## 2018-05-02 RX ADMIN — ENOXAPARIN SODIUM 30 MG: 100 INJECTION SUBCUTANEOUS at 05:05

## 2018-05-02 RX ADMIN — FUROSEMIDE 80 MG: 40 TABLET ORAL at 05:05

## 2018-05-02 RX ADMIN — CITALOPRAM HYDROBROMIDE 30 MG: 20 TABLET ORAL at 08:05

## 2018-05-02 RX ADMIN — BUTALBITAL, ACETAMINOPHEN AND CAFFEINE 1 TABLET: 50; 325; 40 TABLET ORAL at 12:05

## 2018-05-02 RX ADMIN — NIFEDIPINE 60 MG: 30 TABLET, FILM COATED, EXTENDED RELEASE ORAL at 08:05

## 2018-05-02 RX ADMIN — DOXAZOSIN MESYLATE 4 MG: 2 TABLET ORAL at 08:05

## 2018-05-02 NOTE — PHYSICIAN QUERY
PT Name: Ewelina Arnold  MR #: [id number]  Physician Query Form - CKD Clarification     CDS/: Erika Dominguez               Contact information:[email or web address]  This form is a permanent document in the medical record.     Query Date: May 2, 2018    By submitting this query, we are merely seeking further clarification of documentation. Please utilize your independent clinical judgment when addressing the question(s) below.    The Medical record contains the following:     Indicators   Supporting Clinical Findings   Location in Medical Record   x CKD or Chronic Kidney (Renal) Failure / Disease CKD (chronic kidney disease), stage IV     MARYAM on CKD 3   H&P      Nephrology progress note 4-30   x BUN/Creatinine                          GFR BUN=54 to 60  Creat=2.7 to 3.3  GFR=15 to 20 Lab 4-28 to 5-2    Dehydration      Nausea / Vomiting      Dialysis / CRRT      Medication      Treatment     x Other Chronic Conditions CHF,Resistant hypertension,Anemia H&P   x Other trends heading towards ESRD.   -Renally dose meds, avoid nephrotoxins, and monitor   Nephrology note 4-30     Provider, please further specify the stage of CKD.      [  ] Chronic Kidney Disease (CKD) (please specify stage* below)       [employer] Definitions  Stage Description  eGFR (mL/min)   [  ]     III Moderately reduced kidney function 30-59   [ yes ]     IV Severely reduced kidney function 15-29   [  ]     V Kidney failure, requiring transplant or dialysis <15     [  ] Other (please specify): ________________________  [  ] Clinically Undetermined    Please document in your progress notes daily for the duration of treatment until resolved and include in your discharge summary.

## 2018-05-02 NOTE — PLAN OF CARE
Problem: Physical Therapy Goal  Goal: Physical Therapy Goal  Goals to be met by: 18     Patient will increase functional independence with mobility by performin. Supine to sit with supervision.   2. Sit to supine with supervision.   3. Sit<>stand transfer with supervision using rolling walker.   4. Gait > 150 feet with SBA using rolling walker.   5. Ascend/descend 7 stairs with bilateral handrails with CGA with or without AD.   Outcome: Ongoing (interventions implemented as appropriate)  Pt progressing well with PT. She continues to have signs of orthostatic hypotension, however denied symptoms during standing activity with PT today.      18 1136 18 1138 18 1141   Vital Signs   /62 (!) 112/55 (!) 99/50   MAP (mmHg) 89 79 71   BP Location Left arm Left arm Left arm   Patient Position Lying Standing Standing after 3 minutes     She demonstrates safety with gait > 100 ft with no device and SBA. Pt given handout with recommendations for hydration, compression, sitting exercise, and standing activity.

## 2018-05-02 NOTE — PROGRESS NOTES
[facility] Hospital Medicine  Progress Note    Patient Name: Ewelina Arnold  MRN: [id number]  Patient Class: IP- Inpatient   Admission Date: 4/28/2018  Length of Stay: 4 days  Attending Physician: Soo Petersen MD  Primary Care Provider: Kalie Walton MD        Subjective:     Principal Problem:Acute diastolic congestive heart failure    HPI:  This is a 72 year old female with suspected renal artery stenosis, hyperaldosteronism, CHF, HTN, CKD, stage IV and frequent hospitalizations for suspected flash pulmonary edema secondary to hypertensive emergencies presents with complaints of shortness of breath, difficulty breathing, and chest pain.  Upon arrival to the ED, the patient had a elevated BNP of 4697 and troponin I 0.21.  Systolic BP was up to the 230s systolic.      Hospital Course:  The patient is received IV Lasix 40mg Q8 and has diuresed 1.5L.  Bipap has been discontinued and the patient has a very stable respiratory status.  Nephrology consulted and started po Lasix 80mg BID and Metolazone.  She has difficult to control BP and slowly titrating medications for improved control but had issues with low BP and dropping with activity so medications had to be readjusted. The patient has difficulty with minimal exertion and PT/OT will assist patient in assessing ADLs and discharge planning.    Interval History:  Pt seen and examined at bedside. She reports feeling well but still having issues with BP. BP elevated this am but dropped this afternoon.    Review of Systems   Constitutional: Negative for fever.   Respiratory: Negative for shortness of breath.    Gastrointestinal: Negative for nausea and vomiting.   Neurological: Negative for dizziness, syncope and light-headedness.     Objective:     Vital Signs (Most Recent):  Temp: 98.3 °F (36.8 °C) (05/02/18 1159)  Pulse: 60 (05/02/18 1200)  Resp: 17 (05/02/18 0900)  BP: 135/60 (05/02/18 1159)  SpO2: 95 % (05/02/18 1159) Vital Signs  (24h Range):  Temp:  [97.6 °F (36.4 °C)-98.7 °F (37.1 °C)] 98.3 °F (36.8 °C)  Pulse:  [60-72] 60  Resp:  [16-17] 17  SpO2:  [93 %-96 %] 95 %  BP: ()/() 135/60     Weight: 52.9 kg (116 lb 10 oz)  Body mass index is 19.41 kg/m².    Intake/Output Summary (Last 24 hours) at 05/02/18 1308  Last data filed at 05/02/18 0800   Gross per 24 hour   Intake              150 ml   Output             1250 ml   Net            -1100 ml      Physical Exam   Constitutional: She is oriented to person, place, and time. She appears well-developed and well-nourished.   Cardiovascular: Normal rate, regular rhythm and intact distal pulses.  Exam reveals no gallop and no friction rub.    Murmur heard.   Crescendo systolic murmur is present with a grade of 3/6   Pulmonary/Chest: Breath sounds normal. She has no wheezes. She has no rales.   Abdominal: Soft. Bowel sounds are normal. She exhibits no distension. There is no tenderness. There is no guarding.   Musculoskeletal: Normal range of motion. She exhibits no edema.   Neurological: She is alert and oriented to person, place, and time. She has normal reflexes.   Skin: Skin is warm and dry.       Significant Labs:   BMP:   Recent Labs  Lab 05/02/18  0454   GLU 74      K 3.7   CL 99   CO2 27   BUN 54*   CREATININE 3.3*   CALCIUM 8.4*   MG 1.6     CBC: No results for input(s): WBC, HGB, HCT, PLT in the last 48 hours.  All pertinent labs within the past 24 hours have been reviewed.    Significant Imaging: I have reviewed all pertinent imaging results/findings within the past 24 hours.    Assessment/Plan:      * Acute diastolic congestive heart failure    Nephrology to manage diuretic therapy  Improved clinical status  Strict I/Os              Acute hypoxemic respiratory failure    Resolved  Supplemental oxygen to maintain sats >92%  Continuous Bipap 12/5 discontinued          Flash pulmonary edema    Due to uncontrolled hypertension  Nephrology adjusting diuretic  regimen  Supplemental oxygen PRN          Nonrheumatic aortic valve stenosis    The patient was previously not a candidate for TAVR based on severity of valvular disease  Continue to try and maximize medical management          Anemia associated with chronic renal failure    Hb remains stable. Monitor          Pulmonary hypertension    No acute management needed          CKD (chronic kidney disease), stage IV    Appreciate Nephrology evaluation and recs  Cr worsened overnight due to transient hypotension.  Cr 2.2 -> 3.3.   Avoid hypotension           Acquired hypothyroidism    Continue usual thyroid supplementation          Resistant hypertension    Continuing to adjust medications for more stable BP.   Currently having highs and lows. Cr increased due to renal hypoperfusion from hypotension  Monitor closely. Hold parameters placed on meds          Coronary artery disease due to calcified coronary lesion    Mildly elevated troponin I initially and has trended downward  Doubt ACS            Other forms of systemic lupus erythematosus    No acute management needed            VTE Risk Mitigation         Ordered     enoxaparin injection 30 mg  Daily      04/30/18 1430     IP VTE HIGH RISK PATIENT  Once      04/28/18 1228     Place sequential compression device  Until discontinued      04/28/18 1228              Soo Petersen MD  Department of Hospital Medicine   [facility]

## 2018-05-02 NOTE — PT/OT/SLP PROGRESS
"Physical Therapy Treatment    Patient Name:  Ewelina Arnold   MRN:  [id number]    Recommendations:     Discharge Recommendations:  home health PT   Discharge Equipment Recommendations: none   Barriers to discharge: None    Assessment:     Ewelina Arnold is a 72 y.o. female admitted with a medical diagnosis of Acute diastolic congestive heart failure.  She presents with the following impairments/functional limitations:   (impaired blood pressure response to activity).    Pt progressing well with PT. She continues to have signs of orthostatic hypotension, however denied symptoms during standing activity with PT today.        05/02/18 1136 05/02/18 1138 05/02/18 1141   Vital Signs   /62 (!) 112/55 (!) 99/50   MAP (mmHg) 89 79 71   BP Location Left arm Left arm Left arm   Patient Position Lying Standing Standing after 3 minutes      She demonstrates safety with gait > 100 ft with no device and SBA. Pt given handout with recommendations for hydration, compression, sitting exercise, and standing activity. Will continue to follow. Recommend addition of compression stockings if approved by physician.      Rehab Prognosis:  Good; patient would benefit from acute skilled PT services to address these deficits and reach maximum level of function.      Recent Surgery: * No surgery found *      Plan:     During this hospitalization, patient to be seen 3 x/week to address the above listed problems via gait training, therapeutic activities, therapeutic exercises, neuromuscular re-education  · Plan of Care Expires:  05/31/18   Plan of Care Reviewed with: patient    Subjective     Communicated with nurse prior to session.  Patient found supine in bed with HOB elevated upon PT entry to room, agreeable to treatment.      Chief Complaint: "my pressure is all over the place"  Patient comments/goals: "Can you write all of this down? I will never remember everything."  Pain/Comfort:  · Pain Rating 1: 0/10  · Pain Rating Post-Intervention " "1: 0/10    Patients cultural, spiritual, Yazdanism conflicts given the current situation: none stated    Objective:       General Precautions: Standard, fall   Orthopedic Precautions:N/A   Braces: N/A     Functional Mobility:  · Bed Mobility:     · Supine to Sit: modified independence  · Transfers:     · Sit to Stand:  supervision with no AD  · Gait: x 122 ft on level tile with no AD, SBA from PT, decreased gait speed, monitoring of symptoms by PT frequently      AM-PAC 6 CLICK MOBILITY  Turning over in bed (including adjusting bedclothes, sheets and blankets)?: 4  Sitting down on and standing up from a chair with arms (e.g., wheelchair, bedside commode, etc.): 3  Moving from lying on back to sitting on the side of the bed?: 4  Moving to and from a bed to a chair (including a wheelchair)?: 3  Need to walk in hospital room?: 3  Climbing 3-5 steps with a railing?: 3  Total Score: 20       Therapeutic Activities and Exercises:  Assessment of orthostatic hypotension as below.     05/02/18 1136 05/02/18 1138 05/02/18 1141   Vital Signs   /62 (!) 112/55 (!) 99/50   MAP (mmHg) 89 79 71   BP Location Left arm Left arm Left arm   Patient Position Lying Standing Standing after 3 minutes   Pt returned to sitting after initial assessment of orthostatic hypotension and performed sitting forearm circles ("speed bag") and sitting marches x 2 minutes in preparation for standing activity and ambulation in halls. At end session, PT gave hand-written handout regarding recommendations including LE compression, sitting activity in prep for standing, avoiding locking knees, dynamic standing instead of static (to include standing marching and lateral weight shifting, referred to as "swaying" for patient) as well as appropriate hydration as approved by MD pending fluid intake restrictions.     Patient left sitting at edge of bed with all lines intact, call button in reach and nurse notified..    GOALS:    Physical Therapy Goals     "    Problem: Physical Therapy Goal    Goal Priority Disciplines Outcome Goal Variances Interventions   Physical Therapy Goal     PT/OT, PT Ongoing (interventions implemented as appropriate)     Description:  Goals to be met by: 18     Patient will increase functional independence with mobility by performin. Supine to sit with supervision.   2. Sit to supine with supervision.   3. Sit<>stand transfer with supervision using rolling walker.   4. Gait > 150 feet with SBA using rolling walker.   5. Ascend/descend 7 stairs with bilateral handrails with CGA with or without AD.                    Time Tracking:     PT Received On: 18  PT Start Time: 1130     PT Stop Time: 1203  PT Total Time (min): 33 min     Billable Minutes: Therapeutic Activity 33    Treatment Type: Treatment  PT/PTA: PT     PTA Visit Number: 0     Tanisha Navarro, DAVID  2018

## 2018-05-02 NOTE — PLAN OF CARE
ELIAS Correa and WILFRED scheduled patient a appointment with the priority clinic to assist with patient re admissions. Appointment in AVS.

## 2018-05-02 NOTE — ASSESSMENT & PLAN NOTE
Continuing to adjust medications for more stable BP.   Currently having highs and lows. Cr increased due to renal hypoperfusion from hypotension  Monitor closely. Hold parameters placed on meds

## 2018-05-02 NOTE — PLAN OF CARE
LMSW met with the patient at the bedside.     Discharge plan discussed. Patient plans to discharge back to her home.     Patients daughter Fransisca will transport he patient home pending discharge.     No CM needs identified.     SW/CM will continue to follow.      05/02/18 1141   Discharge Reassessment   Assessment Type Discharge Planning Reassessment   Provided patient/caregiver education on the expected discharge date and the discharge plan Yes   Do you have any problems affording any of your prescribed medications? No   Discharge Plan A Home   Patient choice form signed by patient/caregiver N/A   Can the patient answer the patient profile reliably? Yes, cognitively intact   How does the patient rate their overall health at the present time? Fair   Describe the patient's ability to walk at the present time. No restrictions   How often would a person be available to care for the patient? Often   During the past month, has the patient often been bothered by feeling down, depressed or hopeless? No   During the past month, has the patient often been bothered by little interest or pleasure in doing things? No

## 2018-05-02 NOTE — PHYSICIAN QUERY
PT Name: Ewelina Arnold  MR #: [id number]    Physician Query Form - Nutrition Clarification     CDS/: Erika Dominguez               Contact information:[email or web address]     This form is a permanent document in the medical record.     Query Date: May 2, 2018    By submitting this query, we are merely seeking further clarification of documentation.. Please utilize your independent clinical judgment when addressing the question(s) below.    The Medical record contains the following:   Indicators  Supporting Clinical Findings Location in Medical Record   x % of Estimated Energy Intake over a time frame from p.o., TF, or TPN % Intake of Estimated Energy Needs: 50 - 75 %   % Meal Intake: 50 - 75 %    RD consult 4-30   x Weight Status over a time frame Pt report of decreased appetite and wt loss pta        RD consult 4-30   x Subcutaneous Fat and/or Muscle Loss Overall Physical Appearance: loss of muscle mass, loss of subcutaneous fat    RD consult 4-30    Fluid Accumulation or Edema      Reduced  Strength     x Wt / BMI / Usual Body Weight Lc=237.62  BMI=19.4 RD consult 4-30    Delayed Wound Healing / Failure to Thrive      Acute or Chronic Illness      Medication      Treatment     x Other 1. Continue renal diet   2. Recommend novasource renal supplement 1x/d if pt continues with poor PO intake    RD consult 4-30     AND / ASPEN Clinical Characteristics (October 2011)  A minimum of two characteristics is recommended for diagnosing either moderate or severe malnutrition   Mild Malnutrition Moderate Malnutrition Severe Malnutrition   Energy Intake from p.o., TF or TPN. < 75% intake of estimated energy needs for less than 7 days < 75% intake of estimated energy needs for greater than 7 days < 50% intake of estimated energy needs for > 5 days   Weight Loss 1-2% in 1 month  5% in 3 months  7.5% in 6 months  10% in 1 year 1-2 % in 1 week  5% in 1 month  7.5% in 3 months  10% in 6 months  20% in 1 year  > 2% in 1 week  > 5% in 1 month  > 7.5% in 3 months  > 10% in 6 months  > 20% in 1 year   Physical Findings     None *Mild subcutaneous fat and/or muscle loss  *Mild fluid accumulation  *Stage II decubitus  *Surgical wound or non-healing wound *Mod/severe subcutaneous fat and/or muscle loss  *Mod/severe fluid accumulation  *Stage III or IV decubitus  *Non-healing surgical wound     Provider, please specify diagnosis or diagnoses associated with above clinical findings.    [ ] Mild Protein-Calorie Malnutrition  [yes ] Moderate Protein-Calorie Malnutrition  [ ] Other Nutritional Diagnosis (please specify): ____________________________________  [ ] Other: ________________________________  [ ] Clinically Undetermined    Please document in your progress notes daily for the duration of treatment until resolved and include in your discharge summary.

## 2018-05-02 NOTE — PLAN OF CARE
Problem: Patient Care Overview  Goal: Plan of Care Review  Outcome: Ongoing (interventions implemented as appropriate)  Good IS performance with no assistance.

## 2018-05-02 NOTE — PROGRESS NOTES
5/2/18  Summary:  Chart reviewed in epic.  Noted blood pressure continues to be labile.      330 pm call to hedy lindsey msw about possible set up for follow up with dr. WENDY Johns upon discharge for close monitoring and follow up of chf symptoms.    Interventions  hbp   chf  Case presentation today with team and dr. Templeton.  Discussed options for monitoring in chf program after discharge or possibly having pt transition to the priority clinic with dr. Templeton for close observation post discharge.  Noted communication from dr. Templeton to interventional team dr. Valencia and he indicates that she is not a candidate for TAVR at this time.  She asked about getting her in the chf monitoring program as well and waiting for a response.      Plan  Chf  Follow up with dr. Templeton on communication with interventional cardiology    HBP  Daily bp  Daily wt  Follow up on receipt of education material

## 2018-05-02 NOTE — PLAN OF CARE
Problem: Patient Care Overview  Goal: Plan of Care Review  Outcome: Ongoing (interventions implemented as appropriate)  Patient resting in bed at this time, no injuries reported, bed locked and in lowest position, night light on, nonskid foot wear applied, call bell and other important items within reach, instructed to call as needed, purposeful rounding done and plan of care discussed, verbalized understanding       BRP, Amb  Tele on reading NSR  Peak BP during shift 170/81, last 164/74  Prn meds given for headache with moderate relief    RA, tolerating, SOB with activity   Bipap not tolerating for long (3-4hrs), HS   saline locked         No further complaints or concerns at this time  Will cont to monitor

## 2018-05-02 NOTE — PLAN OF CARE
Problem: NPPV/CPAP (Adult)  Goal: Signs and Symptoms of Listed Potential Problems Will be Absent, Minimized or Managed (NPPV/CPAP)  Signs and symptoms of listed potential problems will be absent, minimized or managed by discharge/transition of care (reference NPPV/CPAP (Adult) CPG).   Outcome: Ongoing (interventions implemented as appropriate)  Declines to wear Bipp tonight.

## 2018-05-02 NOTE — SUBJECTIVE & OBJECTIVE
Interval History:  Pt seen and examined at bedside. She reports feeling well but still having issues with BP. BP elevated this am but dropped this afternoon.    Review of Systems   Constitutional: Negative for fever.   Respiratory: Negative for shortness of breath.    Gastrointestinal: Negative for nausea and vomiting.   Neurological: Negative for dizziness, syncope and light-headedness.     Objective:     Vital Signs (Most Recent):  Temp: 98.3 °F (36.8 °C) (05/02/18 1159)  Pulse: 60 (05/02/18 1200)  Resp: 17 (05/02/18 0900)  BP: 135/60 (05/02/18 1159)  SpO2: 95 % (05/02/18 1159) Vital Signs (24h Range):  Temp:  [97.6 °F (36.4 °C)-98.7 °F (37.1 °C)] 98.3 °F (36.8 °C)  Pulse:  [60-72] 60  Resp:  [16-17] 17  SpO2:  [93 %-96 %] 95 %  BP: ()/() 135/60     Weight: 52.9 kg (116 lb 10 oz)  Body mass index is 19.41 kg/m².    Intake/Output Summary (Last 24 hours) at 05/02/18 1308  Last data filed at 05/02/18 0800   Gross per 24 hour   Intake              150 ml   Output             1250 ml   Net            -1100 ml      Physical Exam   Constitutional: She is oriented to person, place, and time. She appears well-developed and well-nourished.   Cardiovascular: Normal rate, regular rhythm and intact distal pulses.  Exam reveals no gallop and no friction rub.    Murmur heard.   Crescendo systolic murmur is present with a grade of 3/6   Pulmonary/Chest: Breath sounds normal. She has no wheezes. She has no rales.   Abdominal: Soft. Bowel sounds are normal. She exhibits no distension. There is no tenderness. There is no guarding.   Musculoskeletal: Normal range of motion. She exhibits no edema.   Neurological: She is alert and oriented to person, place, and time. She has normal reflexes.   Skin: Skin is warm and dry.       Significant Labs:   BMP:   Recent Labs  Lab 05/02/18  0454   GLU 74      K 3.7   CL 99   CO2 27   BUN 54*   CREATININE 3.3*   CALCIUM 8.4*   MG 1.6     CBC: No results for input(s): WBC, HGB,  HCT, PLT in the last 48 hours.  All pertinent labs within the past 24 hours have been reviewed.    Significant Imaging: I have reviewed all pertinent imaging results/findings within the past 24 hours.

## 2018-05-02 NOTE — PHYSICIAN QUERY
PT Name: Ewelina Arnold  MR #: [id number]     Physician Query Form - Documentation Clarification      CDS/: Erika Dominguez               Contact information:[email or web address]    This form is a permanent document in the medical record.     Query Date: May 2, 2018    By submitting this query, we are merely seeking further clarification of documentation. Please utilize your independent clinical judgment when addressing the question(s) below.    The Medical record reflects the following:    Supporting Clinical Findings Location in Medical Record   Past medical history of CHF    Acute combined systolic and diastolic congestive heart failure    Lasix 80mg IV Q8      Heart Failure, Diastolic, Acute on Chronic   4/2/2018: Normal left ventricular size with low normal to mildly depressed systolic function. EF 50-55%. Severe LVH. Moderate diastolic dysfunction. Moderate to severe AS - 3.3 m/s - 0.9 cm2. SPAP 52 mmHg. Trivial pericardial effusion.     Acute diastolic congestive heart failure    H&P            Nephrology progress note 4-30                     HM progress note 4-30                                                                                Doctor, Please specify diagnosis or diagnoses associated with above clinical findings.    Please further specify the diagnosis of CHF.    Provider Use Only    [     ]  Acute diastolic CHF      [   yes  ]  Acute on chronic diastolic CHF      [     ]  Acute systolic and diastolic CHF      [     ]  Acute on chronic combined CHF      [     ]  Other:please specify:                                                                                                                   [  ] Clinically undetermined

## 2018-05-02 NOTE — PROGRESS NOTES
Progress Note  [facility]    Admit Date: 4/28/2018   LOS: 4 days     SUBJECTIVE:     Follow-up For:      Interval History:     Events noted over last 24 hours.  Very labile BP ranging from .  Sitting in bed in NAD.  Feeling better just many questions about HTN.       Review of Systems:  Constitutional: No fever or chills  Respiratory:  shortness of breath improved.   Cardiovascular: No chest pain or palpitations  Gastrointestinal: No nausea or vomiting  Neurological: No confusion or weakness    OBJECTIVE:     Vital Signs Range (Last 24H):  Vitals:    05/02/18 0900   BP: (!) 201/104   Pulse: 68   Resp: 17   Temp: 97.6 °F (36.4 °C)       Temp:  [97.6 °F (36.4 °C)-98.7 °F (37.1 °C)]   Pulse:  [62-72]   Resp:  [16-17]   BP: (116-201)/()   SpO2:  [93 %-96 %]     I & O (Last 24H):    Intake/Output Summary (Last 24 hours) at 05/02/18 1043  Last data filed at 05/02/18 0800   Gross per 24 hour   Intake              150 ml   Output             1250 ml   Net            -1100 ml       Physical Exam:  General appearance: Well developed, well nourished  Eyes:  Conjunctivae/corneas clear. PERRL.  Lungs: Normal respiratory effort,   CTA  Heart: Regular rate and rhythm, S1, S2 normal, no murmur, rub or ruma.  Abdomen: Soft, non-tender non-distended; bowel sounds normal; no masses,  no organomegaly  Extremities: No cyanosis or clubbing. trace+ pedal  edema.  DP pulses 2+/4+ bilaterally.  Skin: Skin color, texture, turgor normal. No rashes or lesions  Neurologic: Normal strength and tone. No focal numbness or weakness     Laboratory Data:      Recent Labs  Lab 05/02/18  0454      K 3.7   CL 99   CO2 27   BUN 54*   CREATININE 3.3*   CALCIUM 8.4*   PHOS 4.3     Lab Results   Component Value Date    .0 (H) 11/21/2016    CALCIUM 8.4 (L) 05/02/2018    PHOS 4.3 05/02/2018     Lab Results   Component Value Date    IRON 32 04/02/2018    TIBC 250 04/02/2018    FERRITIN 261 04/04/2018        Medications:  Medication list was reviewed and changes noted under Assessment/Plan.    Diagnostic Results:    Reviewed most recent CT/US/Echo/MRI    ASSESSMENT/PLAN:       Labile Nonoliguric MARYAM on CKD 3 with uni-nephric hypertensive heart and acute on chronic diastolic HTN/ Acc HTN and worsening Creat due to BP lability  (N17.9, N18.3, I13.0, I50.33): Prior renal arterial doppler with some evidence of FRAN but repeat US (x3) reviewed by Dr. Lea does not show significant stenosis.   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  This is being worked up again by Dr. Brizuela  -No ARB/ACE/Syd for now  -Considering scleroderma in differential for flash pulm edema but no other stigmata.  Ordered anti-RNA polymerase III autoantibodies are negative so all things pointing to valvular disease as cause of her BP.  -trends heading towards ESRD.   -Renally dose meds, avoid nephrotoxins, and monitor I/O's closely.       Accelerated HTN XXX  (I 13.0):  -Restarted home medications and titrated  -Flash Pulmonary edema likely from hypertensive crisis but what caused the crisis?  -can't use clonidine or acei/ARB/Syd due to severe reactions.  -lasix 80 BID.  S/P few days of Metolazone.    -started Cardura as additive agent.  Only other option will be Minoxidil.    -having some orthostatic BP's likely from vasodilatory meds.  Will check orthos BID.      SLE (M32.9):  Continue plaquenil.  Defer. Anti-histone negative.      Heart Failure, Diastolic, Acute on Chronic              4/2/2018: Normal left ventricular size with low normal to mildly depressed systolic function. EF 50-55%. Severe LVH. Moderate  diastolic dysfunction. Moderate to severe AS - 3.3 m/s - 0.9 cm2. SPAP 52 mmHg. Trivial pericardial effusion.               Probably low flow moderate to severe AS with normal EF.               Doubt AS much of a cause for her condition but diastolic dysfunction advanced.              4/28/2018: BNP 4,697.              Diuresis.   She was evaluated by Dr. Rob on 11/4/17 and was determined to be moderate surgical risk due to comorbidities.  She has [place] [facility] [place]  She was found to have moderate AS and severe diastolic dysfunction with pulm HTN.  Have reached out to both for more opinions.         See above  Home soon with close f/u in clinic with Dr. Brizuela

## 2018-05-02 NOTE — ASSESSMENT & PLAN NOTE
Appreciate Nephrology evaluation and recs  Cr worsened overnight due to transient hypotension.  Cr 2.2 -> 3.3.   Avoid hypotension

## 2018-05-02 NOTE — PHYSICIAN QUERY
PT Name: Ewelina Arnold  MR #: [id number]     Physician Query Form - Documentation Clarification      CDS/: Erika Dominguez               Contact information:[email or web address]    This form is a permanent document in the medical record.     Query Date: May 2, 2018    By submitting this query, we are merely seeking further clarification of documentation. Please utilize your independent clinical judgment when addressing the question(s) below.    The Medical record reflects the following:    Supporting Clinical Findings Location in Medical Record   Pulmonary hypertension     Acute combined systolic and diastolic congestive heart failure    Lasix 80mg IV Q8      Acute hypoxemic respiratory failure    Supplemental oxygen to maintain sats >92%   Continuous Bipap 12/5     H&P                                                                                Doctor, Please specify diagnosis or diagnoses associated with above clinical findings.    Provider Use Only      Please specify the type of Pulmonary Hypertension:    [ x    ] Pulmonary hypertension due to Left  heart disease (Group 2)  [     ] Pulmonary hypertension due to Lung disease and hypoxia (Group 3)  [     ] Other: ___________________                                                                                                               [  ] Clinically undetermined

## 2018-05-02 NOTE — PROGRESS NOTES
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 She developed lupus in 1994. Over the last year accelerated hypertension and rise in BUN/crea. On 1/18/2018 she had a Renal Duplex that did not reveal any increased velocity in the right renal artery but possibly dampened velocities more distally. She had a repeat Renal Duplex on 3/20/2018 that reveled a similar peak velocity in the right renal artery of 0.64 m/s. On 4/4/2018 she had a third U/S of the renal artery that revealed a peak velocity 0.79 m/s. I was then asked to see her for the possibility of FRAN and advised against angiography as I felt FRAN was unlikely.      She was seen as an outpatient by her PCP on 4/19/2018 and then had a blood pressure of 140/60 mmHg. She became increasingly short of breath beginning on about 4/26/2018. She present with a blood pressure of 259/123 in respiratory distress to the ER and is in pulmonary edema. No acute ST-T wave changes. She is admitted to the ICU. Her breathing is much improved after diuresis and better control of her hypertension.    Hospital Course:    Diuresis.    Adjustment of antihypertensives.    Interval History:    Comfortable at rest. Denies dyspnea. No orthopnea. Edema of ankles has resolved.    Blood pressure dramatically lower after further diuresis.    Crea risen.    Review of Systems   Constitution: Negative for chills, fever, weakness and malaise/fatigue.   HENT: Negative for nosebleeds.    Eyes: Negative for vision loss in left eye and vision loss in  right eye.   Cardiovascular: Negative for chest pain, leg swelling, orthopnea, palpitations and paroxysmal nocturnal dyspnea.   Respiratory: Negative for cough, hemoptysis, shortness of breath, sputum production and wheezing.    Hematologic/Lymphatic: Negative for bleeding problem.   Skin: Negative for rash.   Musculoskeletal: Negative for myalgias.   Gastrointestinal: Negative for abdominal pain, heartburn, hematemesis, hematochezia, jaundice, melena, nausea and vomiting.   Genitourinary: Negative for hematuria.   Neurological: Negative for dizziness, headaches, light-headedness and vertigo.   Psychiatric/Behavioral: Negative for altered mental status. The patient is not nervous/anxious.    Allergic/Immunologic: Negative for persistent infections.     Objective:     Vital Signs (Most Recent):  Temp: 97.8 °F (36.6 °C) (05/02/18 1646)  Pulse: 61 (05/02/18 1646)  Resp: 17 (05/02/18 0900)  BP: 134/62 (05/02/18 1646)  SpO2: 98 % (05/02/18 1646) Vital Signs (24h Range):  Temp:  [97.6 °F (36.4 °C)-98.7 °F (37.1 °C)] 97.8 °F (36.6 °C)  Pulse:  [60-69] 61  Resp:  [16-17] 17  SpO2:  [93 %-98 %] 98 %  BP: ()/() 134/62     Weight: 52.9 kg (116 lb 10 oz)  Body mass index is 19.41 kg/m².    SpO2: 98 %  O2 Device (Oxygen Therapy): room air      Intake/Output Summary (Last 24 hours) at 05/02/18 1815  Last data filed at 05/02/18 0800   Gross per 24 hour   Intake              150 ml   Output             1250 ml   Net            -1100 ml       Lines/Drains/Airways     Peripheral Intravenous Line                 Peripheral IV - Single Lumen 04/28/18 0908 Left Wrist 4 days         Peripheral IV - Single Lumen 04/28/18 1801 Right Wrist 4 days                Physical Exam   Constitutional: She is oriented to person, place, and time. She appears well-developed and well-nourished.  Non-toxic appearance. She does not appear ill. No distress.   Eyes: Right conjunctiva is not injected. Right conjunctiva has no hemorrhage. Left  conjunctiva is not injected. Left conjunctiva has no hemorrhage.   Neck: No JVD present.   Cardiovascular: Normal rate, regular rhythm, S1 normal and S2 normal.  Exam reveals no gallop.    Murmur heard.   Harsh midsystolic murmur is present with a grade of 3/6  at the upper right sternal border radiating to the neck  Pulmonary/Chest: Effort normal and breath sounds normal.   Abdominal: Normal appearance. There is no tenderness.   Musculoskeletal:        Right ankle: She exhibits no swelling.        Left ankle: She exhibits no swelling.   Neurological: She is alert and oriented to person, place, and time. She is not disoriented.   Skin: Skin is warm and dry. No rash noted.   Psychiatric: Her speech is normal and behavior is normal. Her affect is angry and blunt. Cognition and memory are normal. She exhibits a depressed mood.     Current Medications:     aspirin  81 mg Oral Daily    carvedilol  25 mg Oral BID    citalopram  30 mg Oral Daily    doxazosin  4 mg Oral Daily    enoxaparin  30 mg Subcutaneous Daily    furosemide  80 mg Oral BID    hydrALAZINE  100 mg Oral Q12H    hydroxychloroquine  200 mg Oral BID    [START ON 5/3/2018] isosorbide mononitrate  60 mg Oral Daily    latanoprost  1 drop Both Eyes Daily    levothyroxine  75 mcg Oral Before breakfast    NIFEdipine  60 mg Oral BID    rosuvastatin  10 mg Oral QHS     Current Laboratory Results:    Recent Results (from the past 24 hour(s))   Basic Metabolic Panel (BMP)    Collection Time: 05/02/18  4:54 AM   Result Value Ref Range    Sodium 136 136 - 145 mmol/L    Potassium 3.7 3.5 - 5.1 mmol/L    Chloride 99 95 - 110 mmol/L    CO2 27 23 - 29 mmol/L    Glucose 74 70 - 110 mg/dL    BUN, Bld 54 (H) 8 - 23 mg/dL    Creatinine 3.3 (H) 0.5 - 1.4 mg/dL    Calcium 8.4 (L) 8.7 - 10.5 mg/dL    Anion Gap 10 8 - 16 mmol/L    eGFR if African American 15 (A) >60 mL/min/1.73 m^2    eGFR if non African American 13 (A) >60 mL/min/1.73 m^2   Magnesium    Collection  Time: 05/02/18  4:54 AM   Result Value Ref Range    Magnesium 1.6 1.6 - 2.6 mg/dL   Phosphorus    Collection Time: 05/02/18  4:54 AM   Result Value Ref Range    Phosphorus 4.3 2.7 - 4.5 mg/dL     Current Imaging Results:    Imaging Results          X-Ray Chest AP Portable (Final result)  Result time 04/28/18 09:55:58    Final result by Jyotsna Beaver MD (04/28/18 09:55:58)                 Impression:      No adverse interval change.      Electronically signed by: Jyotsna Beaver MD  Date:    04/28/2018  Time:    09:55             Narrative:    EXAMINATION:  XR CHEST AP PORTABLE    CLINICAL HISTORY:  CHF;    TECHNIQUE:  Single frontal view of the chest was performed.    COMPARISON:  04/14/2018    FINDINGS:  As compared to the previous study of 04/14/2018, there has been no significant change in the cardiopulmonary findings.                                  Assessment and Plan:     Problem List:    Active Diagnoses:    Diagnosis Date Noted POA    PRINCIPAL PROBLEM:  Acute diastolic congestive heart failure [I50.31] 04/28/2018 Yes    Acute hypoxemic respiratory failure [J96.01] 04/28/2018 Yes    Flash pulmonary edema [J81.0] 04/12/2018 Yes    Nonrheumatic aortic valve stenosis [I35.0] 11/18/2017 Yes    Anemia associated with chronic renal failure [D63.1] 07/25/2017 Yes    Pulmonary hypertension [I27.20] 05/16/2017 Yes    CKD (chronic kidney disease), stage IV [N18.4] 01/23/2017 Yes    Acquired hypothyroidism [E03.9] 05/13/2015 Yes    Coronary artery disease due to calcified coronary lesion [I25.10, I25.84] 10/05/2012 Yes    Resistant hypertension [I10] 10/05/2012 Yes    Other forms of systemic lupus erythematosus [M32.8] 08/17/2012 Yes     Chronic      Problems Resolved During this Admission:    Diagnosis Date Noted Date Resolved POA     Assessment and Plan:     1. Chronic Kidney Disease, Stage 4              4/2/2018: BUN/crea 47/2.6. CrCl 20.              4/29/2018: BUN/crea 60/2.7. CrCl 17.   5/2/2018:  BUN/crea 54/3.3. CrCl 15.              Appears to be major issue.     2. Heart Failure, Diastolic, Acute on Chronic              4/2/2018: Normal left ventricular size with low normal to mildly depressed systolic function. EF 50-55%. Severe LVH. Moderate diastolic dysfunction. Moderate to severe AS - 3.3 m/s - 0.9 cm2. SPAP 52 mmHg. Trivial pericardial effusion.               Probably low flow moderate to severe AS with normal EF.              Doubt AS much of a cause for her condition but diastolic dysfunction advanced.              4/28/2018: BNP 4,697.   5/1/2018: BNP 3,692.              Improving with diuresis.     3. Hypertension              Severe and accelerated.              1/18/2018: U/S Renal: Right Renal Artery: Poorly visualized ostium. Peak velocity 0.64 m/s.               3/20/2018: U/S Renal: Right Renal Artery: Peak velocity 0.62 m/s.              4/4/2018: U/S Renal: Right Renal Artery: Peak velocity 0.79 m/s.               That the above three studies do not reveal any increased velocity in the right renal artery makes significant renal artery stenosis as a cause for her severe hypertension and rise in BUN/crea unlikely.              I would not favor angiography.   No ACEI, ARB or spironolactone due to advanced CKD.    No clonidine diue to side effects.              At Home: On carvedilol 25 mg Q12, hydralazine 100 mg Q12, isosorbide mononitrate 30 mg Q24, nifedipine 90 mg Q24 and furosemide 40 mg Q24.    Currently: On carvedilol 25 mg Q12, hydralazine 100 mg Q12, isosorbide mononitrate 60 mg Q24, nifedipine 60 mg Q12, doxazosin 4 mg Q24 and furosemide 80 mg Q12.   Coming down with diuresis.    4. Hypercholesterolemia              On rosuvastatin 10 mg Q24.      VTE Risk Mitigation         Ordered     enoxaparin injection 30 mg  Daily      04/30/18 1430     IP VTE HIGH RISK PATIENT  Once      04/28/18 1228     Place sequential compression device  Until discontinued      04/28/18 1228           Risa Lea MD  Cardiology  [facility]

## 2018-05-03 VITALS
BODY MASS INDEX: 18.99 KG/M2 | WEIGHT: 114 LBS | SYSTOLIC BLOOD PRESSURE: 184 MMHG | DIASTOLIC BLOOD PRESSURE: 74 MMHG | OXYGEN SATURATION: 98 % | TEMPERATURE: 98 F | HEART RATE: 69 BPM | RESPIRATION RATE: 17 BRPM | HEIGHT: 65 IN

## 2018-05-03 PROBLEM — I50.33 ACUTE ON CHRONIC DIASTOLIC CONGESTIVE HEART FAILURE: Status: ACTIVE | Noted: 2018-04-28

## 2018-05-03 LAB
ANION GAP SERPL CALC-SCNC: 12 MMOL/L
BUN SERPL-MCNC: 58 MG/DL
CALCIUM SERPL-MCNC: 8.3 MG/DL
CHLORIDE SERPL-SCNC: 98 MMOL/L
CO2 SERPL-SCNC: 27 MMOL/L
CREAT SERPL-MCNC: 3.5 MG/DL
EST. GFR  (AFRICAN AMERICAN): 14 ML/MIN/1.73 M^2
EST. GFR  (NON AFRICAN AMERICAN): 12 ML/MIN/1.73 M^2
GLUCOSE SERPL-MCNC: 76 MG/DL
MAGNESIUM SERPL-MCNC: 1.7 MG/DL
PHOSPHATE SERPL-MCNC: 4.7 MG/DL
POTASSIUM SERPL-SCNC: 3.5 MMOL/L
SODIUM SERPL-SCNC: 137 MMOL/L

## 2018-05-03 PROCEDURE — 83735 ASSAY OF MAGNESIUM: CPT

## 2018-05-03 PROCEDURE — 25000003 PHARM REV CODE 250: Performed by: INTERNAL MEDICINE

## 2018-05-03 PROCEDURE — 25000003 PHARM REV CODE 250: Performed by: HOSPITALIST

## 2018-05-03 PROCEDURE — 99239 HOSP IP/OBS DSCHRG MGMT >30: CPT | Mod: ,,, | Performed by: INTERNAL MEDICINE

## 2018-05-03 PROCEDURE — 36415 COLL VENOUS BLD VENIPUNCTURE: CPT

## 2018-05-03 PROCEDURE — 84100 ASSAY OF PHOSPHORUS: CPT

## 2018-05-03 PROCEDURE — 80048 BASIC METABOLIC PNL TOTAL CA: CPT

## 2018-05-03 PROCEDURE — 25000003 PHARM REV CODE 250: Performed by: NURSE PRACTITIONER

## 2018-05-03 RX ORDER — METOLAZONE 2.5 MG/1
5 TABLET ORAL DAILY PRN
Qty: 60 TABLET | Refills: 11 | Status: ON HOLD | OUTPATIENT
Start: 2018-05-03 | End: 2018-09-06 | Stop reason: HOSPADM

## 2018-05-03 RX ORDER — BUTALBITAL, ACETAMINOPHEN AND CAFFEINE 50; 325; 40 MG/1; MG/1; MG/1
1 TABLET ORAL EVERY 6 HOURS PRN
Qty: 30 TABLET | Refills: 0 | Status: SHIPPED | OUTPATIENT
Start: 2018-05-03 | End: 2018-06-02

## 2018-05-03 RX ORDER — DOXAZOSIN 4 MG/1
4 TABLET ORAL DAILY
Qty: 30 TABLET | Refills: 1 | Status: SHIPPED | OUTPATIENT
Start: 2018-05-04 | End: 2018-05-07

## 2018-05-03 RX ORDER — NIFEDIPINE 60 MG/1
60 TABLET, EXTENDED RELEASE ORAL 2 TIMES DAILY
Qty: 60 TABLET | Refills: 1 | Status: SHIPPED | OUTPATIENT
Start: 2018-05-03 | End: 2018-07-23

## 2018-05-03 RX ORDER — ISOSORBIDE MONONITRATE 60 MG/1
60 TABLET, EXTENDED RELEASE ORAL DAILY
Qty: 30 TABLET | Refills: 1 | Status: SHIPPED | OUTPATIENT
Start: 2018-05-03 | End: 2018-07-23

## 2018-05-03 RX ORDER — FUROSEMIDE 80 MG/1
80 TABLET ORAL 2 TIMES DAILY
Qty: 60 TABLET | Refills: 1 | Status: SHIPPED | OUTPATIENT
Start: 2018-05-03 | End: 2018-06-28 | Stop reason: DRUGHIGH

## 2018-05-03 RX ADMIN — HYDRALAZINE HYDROCHLORIDE 100 MG: 25 TABLET, FILM COATED ORAL at 09:05

## 2018-05-03 RX ADMIN — LEVOTHYROXINE SODIUM 75 MCG: 75 TABLET ORAL at 06:05

## 2018-05-03 RX ADMIN — LATANOPROST 1 DROP: 50 SOLUTION OPHTHALMIC at 09:05

## 2018-05-03 RX ADMIN — FUROSEMIDE 80 MG: 40 TABLET ORAL at 09:05

## 2018-05-03 RX ADMIN — DOXAZOSIN MESYLATE 4 MG: 2 TABLET ORAL at 09:05

## 2018-05-03 RX ADMIN — NIFEDIPINE 60 MG: 30 TABLET, FILM COATED, EXTENDED RELEASE ORAL at 09:05

## 2018-05-03 RX ADMIN — CARVEDILOL 25 MG: 12.5 TABLET, FILM COATED ORAL at 09:05

## 2018-05-03 RX ADMIN — CITALOPRAM HYDROBROMIDE 30 MG: 20 TABLET ORAL at 09:05

## 2018-05-03 RX ADMIN — ASPIRIN 81 MG: 81 TABLET, COATED ORAL at 09:05

## 2018-05-03 RX ADMIN — HYDROXYCHLOROQUINE SULFATE 200 MG: 200 TABLET, FILM COATED ORAL at 09:05

## 2018-05-03 NOTE — PROGRESS NOTES
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 She developed lupus in 1994. Over the last year accelerated hypertension and rise in BUN/crea. On 1/18/2018 she had a Renal Duplex that did not reveal any increased velocity in the right renal artery but possibly dampened velocities more distally. She had a repeat Renal Duplex on 3/20/2018 that reveled a similar peak velocity in the right renal artery of 0.64 m/s. On 4/4/2018 she had a third U/S of the renal artery that revealed a peak velocity 0.79 m/s. I was then asked to see her for the possibility of FRAN and advised against angiography as I felt FRAN was unlikely.      She was seen as an outpatient by her PCP on 4/19/2018 and then had a blood pressure of 140/60 mmHg. She became increasingly short of breath beginning on about 4/26/2018. She present with a blood pressure of 259/123 in respiratory distress to the ER and is in pulmonary edema. No acute ST-T wave changes. She is admitted to the ICU. Her breathing is much improved after diuresis and better control of her hypertension.    Hospital Course:    Diuresis.    Adjustment of antihypertensives.    Interval History:    Comfortable at rest. Denies dyspnea. No orthopnea. Edema of ankles has resolved.    Blood pressure dramatically lower after further diuresis.    Crea continues to rise.    Review of Systems   Constitution: Negative for chills, fever, weakness and malaise/fatigue.   HENT: Negative for nosebleeds.    Eyes: Negative for vision loss in left eye and vision  loss in right eye.   Cardiovascular: Negative for chest pain, leg swelling, orthopnea, palpitations and paroxysmal nocturnal dyspnea.   Respiratory: Negative for cough, hemoptysis, shortness of breath, sputum production and wheezing.    Hematologic/Lymphatic: Negative for bleeding problem.   Skin: Negative for rash.   Musculoskeletal: Negative for myalgias.   Gastrointestinal: Negative for abdominal pain, heartburn, hematemesis, hematochezia, jaundice, melena, nausea and vomiting.   Genitourinary: Negative for hematuria.   Neurological: Negative for dizziness, headaches, light-headedness and vertigo.   Psychiatric/Behavioral: Negative for altered mental status. The patient is not nervous/anxious.    Allergic/Immunologic: Negative for persistent infections.     Objective:     Vital Signs (Most Recent):  Temp: 98.3 °F (36.8 °C) (05/03/18 0832)  Pulse: 69 (05/03/18 1000)  Resp: 17 (05/03/18 0832)  BP: (!) 184/74 (05/03/18 0905)  SpO2: 98 % (05/03/18 0832) Vital Signs (24h Range):  Temp:  [97.8 °F (36.6 °C)-98.3 °F (36.8 °C)] 98.3 °F (36.8 °C)  Pulse:  [60-72] 69  Resp:  [16-18] 17  SpO2:  [94 %-98 %] 98 %  BP: ()/(50-83) 184/74     Weight: 51.7 kg (113 lb 15.7 oz)  Body mass index is 18.97 kg/m².    SpO2: 98 %  O2 Device (Oxygen Therapy): room air      Intake/Output Summary (Last 24 hours) at 05/03/18 1032  Last data filed at 05/03/18 0500   Gross per 24 hour   Intake              200 ml   Output             1500 ml   Net            -1300 ml       Lines/Drains/Airways     Peripheral Intravenous Line                 Peripheral IV - Single Lumen 04/28/18 0908 Left Wrist 5 days         Peripheral IV - Single Lumen 04/28/18 1801 Right Wrist 4 days                Physical Exam   Constitutional: She is oriented to person, place, and time. She appears well-developed and well-nourished.  Non-toxic appearance. She does not appear ill. No distress.   Eyes: Right conjunctiva is not injected. Right conjunctiva has no  hemorrhage. Left conjunctiva is not injected. Left conjunctiva has no hemorrhage.   Neck: No JVD present.   Cardiovascular: Normal rate, regular rhythm, S1 normal and S2 normal.  Exam reveals no gallop.    Murmur heard.   Harsh midsystolic murmur is present with a grade of 3/6  at the upper right sternal border radiating to the neck  Pulmonary/Chest: Effort normal and breath sounds normal.   Abdominal: Normal appearance. There is no tenderness.   Musculoskeletal:        Right ankle: She exhibits no swelling.        Left ankle: She exhibits no swelling.   Neurological: She is alert and oriented to person, place, and time. She is not disoriented.   Skin: Skin is warm and dry. No rash noted.   Psychiatric: Her speech is normal and behavior is normal. Her affect is angry and blunt. Cognition and memory are normal. She exhibits a depressed mood.     Current Medications:     aspirin  81 mg Oral Daily    carvedilol  25 mg Oral BID    citalopram  30 mg Oral Daily    doxazosin  4 mg Oral Daily    enoxaparin  30 mg Subcutaneous Daily    furosemide  80 mg Oral BID    hydrALAZINE  100 mg Oral Q12H    hydroxychloroquine  200 mg Oral BID    isosorbide mononitrate  60 mg Oral Daily    latanoprost  1 drop Both Eyes Daily    levothyroxine  75 mcg Oral Before breakfast    NIFEdipine  60 mg Oral BID    rosuvastatin  10 mg Oral QHS     Current Laboratory Results:    Recent Results (from the past 24 hour(s))   Basic Metabolic Panel (BMP)    Collection Time: 05/03/18  5:11 AM   Result Value Ref Range    Sodium 137 136 - 145 mmol/L    Potassium 3.5 3.5 - 5.1 mmol/L    Chloride 98 95 - 110 mmol/L    CO2 27 23 - 29 mmol/L    Glucose 76 70 - 110 mg/dL    BUN, Bld 58 (H) 8 - 23 mg/dL    Creatinine 3.5 (H) 0.5 - 1.4 mg/dL    Calcium 8.3 (L) 8.7 - 10.5 mg/dL    Anion Gap 12 8 - 16 mmol/L    eGFR if African American 14 (A) >60 mL/min/1.73 m^2    eGFR if non African American 12 (A) >60 mL/min/1.73 m^2   Magnesium    Collection Time:  05/03/18  5:11 AM   Result Value Ref Range    Magnesium 1.7 1.6 - 2.6 mg/dL   Phosphorus    Collection Time: 05/03/18  5:11 AM   Result Value Ref Range    Phosphorus 4.7 (H) 2.7 - 4.5 mg/dL     Current Imaging Results:    Imaging Results          X-Ray Chest AP Portable (Final result)  Result time 04/28/18 09:55:58    Final result by Jyotsna Beaver MD (04/28/18 09:55:58)                 Impression:      No adverse interval change.      Electronically signed by: Jyotsna Beaver MD  Date:    04/28/2018  Time:    09:55             Narrative:    EXAMINATION:  XR CHEST AP PORTABLE    CLINICAL HISTORY:  CHF;    TECHNIQUE:  Single frontal view of the chest was performed.    COMPARISON:  04/14/2018    FINDINGS:  As compared to the previous study of 04/14/2018, there has been no significant change in the cardiopulmonary findings.                                  Assessment and Plan:     Problem List:    Active Diagnoses:    Diagnosis Date Noted POA    PRINCIPAL PROBLEM:  Acute diastolic congestive heart failure [I50.31] 04/28/2018 Yes    Acute hypoxemic respiratory failure [J96.01] 04/28/2018 Yes    Flash pulmonary edema [J81.0] 04/12/2018 Yes    Nonrheumatic aortic valve stenosis [I35.0] 11/18/2017 Yes    Anemia associated with chronic renal failure [D63.1] 07/25/2017 Yes    Pulmonary hypertension [I27.20] 05/16/2017 Yes    CKD (chronic kidney disease), stage IV [N18.4] 01/23/2017 Yes    Acquired hypothyroidism [E03.9] 05/13/2015 Yes    Coronary artery disease due to calcified coronary lesion [I25.10, I25.84] 10/05/2012 Yes    Resistant hypertension [I10] 10/05/2012 Yes    Other forms of systemic lupus erythematosus [M32.8] 08/17/2012 Yes     Chronic      Problems Resolved During this Admission:    Diagnosis Date Noted Date Resolved POA     Assessment and Plan:     1. Chronic Kidney Disease, Stage 4              4/2/2018: BUN/crea 47/2.6. CrCl 20.              4/29/2018: BUN/crea 60/2.7. CrCl 17.   5/2/2018:  BUN/crea 54/3.3. CrCl 15.              Appears to be major issue.     2. Heart Failure, Diastolic, Acute on Chronic              4/2/2018: Normal left ventricular size with low normal to mildly depressed systolic function. EF 50-55%. Severe LVH. Moderate diastolic dysfunction. Moderate to severe AS - 3.3 m/s - 0.9 cm2. SPAP 52 mmHg. Trivial pericardial effusion.               Probably low flow moderate to severe AS with normal EF.              Doubt AS much of a cause for her condition but diastolic dysfunction advanced.              4/28/2018: BNP 4,697.   5/1/2018: BNP 3,692.              Improving with diuresis.     3. Hypertension              Severe and accelerated.              1/18/2018: U/S Renal: Right Renal Artery: Poorly visualized ostium. Peak velocity 0.64 m/s.               3/20/2018: U/S Renal: Right Renal Artery: Peak velocity 0.62 m/s.              4/4/2018: U/S Renal: Right Renal Artery: Peak velocity 0.79 m/s.               That the above three studies do not reveal any increased velocity in the right renal artery makes significant renal artery stenosis as a cause for her severe hypertension and rise in BUN/crea unlikely.              I would not favor angiography.   No ACEI, ARB or spironolactone due to advanced CKD.    No clonidine diue to side effects.              At Home: On carvedilol 25 mg Q12, hydralazine 100 mg Q12, isosorbide mononitrate 30 mg Q24, nifedipine 90 mg Q24 and furosemide 40 mg Q24.    Currently: On carvedilol 25 mg Q12, hydralazine 100 mg Q12, isosorbide mononitrate 60 mg Q24, nifedipine 60 mg Q12, doxazosin 4 mg Q24 and furosemide 80 mg Q12.   Coming down with diuresis.    4. Hypercholesterolemia              On rosuvastatin 10 mg Q24.      VTE Risk Mitigation         Ordered     enoxaparin injection 30 mg  Daily      04/30/18 1430     IP VTE HIGH RISK PATIENT  Once      04/28/18 1228     Place sequential compression device  Until discontinued      04/28/18 1228           Risa Lea MD  Cardiology  [facility]

## 2018-05-03 NOTE — PROGRESS NOTES
Progress Note  [facility]    Admit Date: 4/28/2018   LOS: 5 days     SUBJECTIVE:     Follow-up For:      Interval History:     Uneventful night.  Feels much better and ready to go.  BP's improved overall.  No CP/SOB.       Review of Systems:  Constitutional: No fever or chills  Respiratory:  shortness of breath improved.   Cardiovascular: No chest pain or palpitations  Gastrointestinal: No nausea or vomiting  Neurological: No confusion or weakness    OBJECTIVE:     Vital Signs Range (Last 24H):  Vitals:    05/03/18 0905   BP: (!) 184/74   Pulse:    Resp:    Temp:        Temp:  [97.8 °F (36.6 °C)-98.3 °F (36.8 °C)]   Pulse:  [60-72]   Resp:  [16-18]   BP: ()/(50-83)   SpO2:  [94 %-98 %]     I & O (Last 24H):    Intake/Output Summary (Last 24 hours) at 05/03/18 0952  Last data filed at 05/03/18 0500   Gross per 24 hour   Intake              200 ml   Output             1500 ml   Net            -1300 ml       Physical Exam:  General appearance: Well developed, well nourished  Eyes:  Conjunctivae/corneas clear. PERRL.  Lungs: Normal respiratory effort,   CTA  Heart: Regular rate and rhythm, S1, S2 normal, no murmur, rub or ruma.  Abdomen: Soft, non-tender non-distended; bowel sounds normal; no masses,  no organomegaly  Extremities: No cyanosis or clubbing. trace+ pedal  edema.  DP pulses 2+/4+ bilaterally.  Skin: Skin color, texture, turgor normal. No rashes or lesions  Neurologic: Normal strength and tone. No focal numbness or weakness     Laboratory Data:      Recent Labs  Lab 05/03/18  0511      K 3.5   CL 98   CO2 27   BUN 58*   CREATININE 3.5*   CALCIUM 8.3*   PHOS 4.7*     Lab Results   Component Value Date    .0 (H) 11/21/2016    CALCIUM 8.3 (L) 05/03/2018    PHOS 4.7 (H) 05/03/2018     Lab Results   Component Value Date    IRON 32 04/02/2018    TIBC 250 04/02/2018    FERRITIN 261 04/04/2018       Medications:  Medication list was reviewed and changes noted under  Assessment/Plan.    Diagnostic Results:    Reviewed most recent CT/US/Echo/MRI    ASSESSMENT/PLAN:       Labile Nonoliguric MARYAM on CKD 3 with uni-nephric hypertensive heart and acute on chronic diastolic HTN/ Acc HTN and worsening Creat due to BP lability  (N17.9, N18.3, I13.0, I50.33): Prior renal arterial doppler with some evidence of FRAN but repeat US (x3) reviewed by Dr. Lea does not show significant stenosis.   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  This is being worked up again by Dr. Brizuela  -No ARB/ACE/Syd for now  -Considered scleroderma in differential for flash pulm edema but no other stigmata.  Ordered anti-RNA polymerase III autoantibodies are negative so all things pointing to valvular disease as cause of her BP.  Still not a candidate for TAVR according to guidelines.    -trends heading towards ESRD.   -Renally dose meds, avoid nephrotoxins, and monitor I/O's closely.       Accelerated HTN XXX  (I 13.0):  -Restarted home medications and titrated  -Flash Pulmonary edema likely from hypertensive crisis but what caused the crisis?  -can't use clonidine or acei/ARB/Syd due to severe reactions.  -lasix 80 BID.  S/P few days of Metolazone.    -started Cardura as additive agent.  Only other option will be Minoxidil.    -having some orthostatic BP's likely from vasodilatory meds.  orthos BP checks much better.      SLE (M32.9):  Continue plaquenil.  Defer. Anti-histone negative.      Heart Failure, Diastolic, Acute on Chronic              4/2/2018: Normal left ventricular size with low normal to mildly depressed systolic function. EF 50-55%. Severe LVH. Moderate  diastolic dysfunction. Moderate to severe AS - 3.3 m/s - 0.9 cm2. SPAP 52 mmHg. Trivial pericardial effusion.               Probably low flow moderate to severe AS with normal EF.              Doubt  AS much of a cause for her condition but diastolic dysfunction advanced.              4/28/2018: BNP 4,697.              Diuresis.   She was evaluated by Dr. Rob on 11/4/17 and was determined to be moderate surgical risk due to comorbidities.  She has had eval for TAVR by Dr. Ly at [facility] on 11/27/2017.  She was found to have moderate AS and severe diastolic dysfunction with pulm HTN.  Have reached out to both for more opinions and still not a candidate for TAVR.         See above  Home Today with f/u in clinic with Dr. Brizuela 1 week.  Discussed with Dr. Petersen/Leonora.    DC meds as current MAR.  Needs Metolazone 5 mg PRN for weight gain  F/u with CHF clinic at [facility]  Needs outpt Sleep study.

## 2018-05-03 NOTE — ASSESSMENT & PLAN NOTE
Cr increased due to renal hypoperfusion from hypotension  BP stable on current regimen  Monitor closely. Hold parameters placed on meds

## 2018-05-03 NOTE — PLAN OF CARE
Problem: Patient Care Overview  Goal: Plan of Care Review  Outcome: Ongoing (interventions implemented as appropriate)  Patient resting in bed at this time, no injuries reported, bed locked and in lowest position, night light on, nonskid foot wear applied, call bell and other important items within reach, instructed to call as needed, purposeful rounding done and plan of care discussed, verbalized understanding       BRP, Amb  Tele on reading NSR  Peak BP during shift 171/72, last 144/66  Prn meds given for headache with moderate relief    RA, tolerating  saline locked         No further complaints or concerns at this time  Will cont to monitor

## 2018-05-03 NOTE — PLAN OF CARE
05/03/18 1101   Medicare Message   Important Message from Medicare regarding Discharge Appeal Rights Given to patient/caregiver;Explained to patient/caregiver;Signed/date by patient/caregiver   Date IMM was signed 05/02/18   Time IMM was signed 1400

## 2018-05-03 NOTE — DISCHARGE SUMMARY
[facility] Hospital Medicine  Discharge Summary      Patient Name: Ewelina Arnold  MRN: [id number]  Admission Date: 4/28/2018  Hospital Length of Stay: 5 days  Discharge Date and Time:  05/03/2018 10:47 AM  Attending Physician: Soo Petersen MD   Discharging Provider: Soo Petersen MD  Primary Care Provider: Kalie Walton MD      HPI:   This is a 72 year old female with suspected renal artery stenosis, hyperaldosteronism, CHF, HTN, CKD, stage IV and frequent hospitalizations for suspected flash pulmonary edema secondary to hypertensive emergencies presents with complaints of shortness of breath, difficulty breathing, and chest pain.  Upon arrival to the ED, the patient had a elevated BNP of 4697 and troponin I 0.21.  Systolic BP was up to the 230s systolic.      * No surgery found *      Hospital Course:   The patient is received IV Lasix 40mg Q8 and has diuresed 1.5L.  Bipap has been discontinued and the patient has a very stable respiratory status.  Nephrology consulted and started po Lasix 80mg BID and Metolazone.  She has difficult to control BP and slowly titrating medications for improved control but had issues with low BP and dropping with activity so medications had to be readjusted. The patient has difficulty with minimal exertion and PT/OT will assist patient in assessing ADLs and discharge planning. Her BP finally stabilized and she is agreeable to [facility] with skilled RN. She will f/u closely outpatient.      Consults:   Consults         Status Ordering Provider     Inpatient consult to Cardiology  Once     Provider:  Risa Lea MD    Completed NICOLLE FALLON JR     Inpatient consult to Nephrology-[facility]  Once     Provider:  Nicolle Fallon Jr., MD    Completed LIEN LEARY     Inpatient consult to Registered Dietitian/Nutritionist  Once     Provider:  (Not yet assigned)    Completed LIEN LEARY     Inpatient consult to Spiritual Care  Once     Provider:   (Not yet assigned)    Acknowledged LIEN LEARY          * Acute on chronic diastolic congestive heart failure    Nephrology to manage diuretic therapy  Improved clinical status  Strict I/Os              Acute hypoxemic respiratory failure    Resolved  Supplemental oxygen to maintain sats >92%  Continuous Bipap 12/5 discontinued          Flash pulmonary edema    Due to uncontrolled hypertension  Nephrology adjusting diuretic regimen  Supplemental oxygen PRN          Nonrheumatic aortic valve stenosis    The patient was previously not a candidate for TAVR based on severity of valvular disease  Continue to try and maximize medical management          Anemia associated with chronic renal failure    Hb remains stable. Monitor          Pulmonary hypertension    No acute management needed          CKD (chronic kidney disease), stage IV    Appreciate Nephrology evaluation and recs  Cr worsened overnight due to transient hypotension.  Avoid hypotension           Acquired hypothyroidism    Continue usual thyroid supplementation          Resistant hypertension      Cr increased due to renal hypoperfusion from hypotension  BP stable on current regimen  Monitor closely. Hold parameters placed on meds          Coronary artery disease due to calcified coronary lesion    Mildly elevated troponin I initially and has trended downward  Doubt ACS            Other forms of systemic lupus erythematosus    No acute management needed            Final Active Diagnoses:    Diagnosis Date Noted POA    PRINCIPAL PROBLEM:  Acute on chronic diastolic congestive heart failure [I50.33] 04/28/2018 Yes    Acute hypoxemic respiratory failure [J96.01] 04/28/2018 Yes    Flash pulmonary edema [J81.0] 04/12/2018 Yes    Nonrheumatic aortic valve stenosis [I35.0] 11/18/2017 Yes    Anemia associated with chronic renal failure [D63.1] 07/25/2017 Yes    Pulmonary hypertension [I27.20] 05/16/2017 Yes    CKD (chronic kidney disease), stage IV [N18.4]  01/23/2017 Yes    Acquired hypothyroidism [E03.9] 05/13/2015 Yes    Coronary artery disease due to calcified coronary lesion [I25.10, I25.84] 10/05/2012 Yes    Resistant hypertension [I10] 10/05/2012 Yes    Other forms of systemic lupus erythematosus [M32.8] 08/17/2012 Yes     Chronic      Problems Resolved During this Admission:    Diagnosis Date Noted Date Resolved POA       Discharged Condition: good    Disposition: Home-Health Care [facility] [place] [place]    Follow Up:  Follow-up Information     Kalie Walton MD In 1 week.    Specialty:  Family Medicine  Why:  hospital follow-up  Contact information:  [address] [facility]  [address]  [facility] [address]  [phone]                 Patient Instructions:     Diet Cardiac     Diet renal     Activity as tolerated         Significant Diagnostic Studies: Labs:   BMP:   Recent Labs  Lab 05/02/18  0454 05/03/18  0511   GLU 74 76    137   K 3.7 3.5   CL 99 98   CO2 27 27   BUN 54* 58*   CREATININE 3.3* 3.5*   CALCIUM 8.4* 8.3*   MG 1.6 1.7    and CBC No results for input(s): WBC, HGB, HCT, PLT in the last 48 hours.    Pending Diagnostic Studies:     None         Medications:  Reconciled Home Medications:      Medication List      START taking these medications    butalbital-acetaminophen-caffeine -40 mg -40 mg per tablet  Commonly known as:  FIORICET, ESGIC  Take 1 tablet by mouth every 6 (six) hours as needed.     doxazosin 4 MG tablet  Commonly known as:  CARDURA  Take 1 tablet (4 mg total) by mouth once daily.  Start taking on:  5/4/2018     metOLazone 2.5 MG tablet  Commonly known as:  ZAROXOLYN  Take 2 tablets (5 mg total) by mouth daily as needed (Weigt gain >2Lbs.).        CHANGE how you take these medications    furosemide 80 MG tablet  Commonly known as:  LASIX  Take 1 tablet (80 mg total) by mouth 2 (two) times daily.  What changed:  · medication strength  · how much to take  · when to take this     isosorbide mononitrate 60 MG 24 hr  tablet  Commonly known as:  IMDUR  Take 1 tablet (60 mg total) by mouth once daily.  What changed:  · medication strength  · how much to take     NIFEdipine 60 MG (OSM) 24 hr tablet  Commonly known as:  PROCARDIA-XL  Take 1 tablet (60 mg total) by mouth 2 (two) times daily.  What changed:  · medication strength  · how much to take  · when to take this        CONTINUE taking these medications    aspirin 81 MG EC tablet  Commonly known as:  ECOTRIN  Take 1 tablet (81 mg total) by mouth once daily.     carvedilol 25 MG tablet  Commonly known as:  COREG  Take 1 tablet (25 mg total) by mouth 2 (two) times daily.     citalopram 20 MG tablet  Commonly known as:  CELEXA  Take 1.5 tablets (30 mg total) by mouth once daily.     famotidine 20 MG tablet  Commonly known as:  PEPCID  Take 1 tablet (20 mg total) by mouth once daily.     hydrALAZINE 100 MG tablet  Commonly known as:  APRESOLINE  Take 1 tablet (100 mg total) by mouth every 12 (twelve) hours.     hydroxychloroquine 200 mg tablet  Commonly known as:  PLAQUENIL  TAKE 1 TABLET TWICE DAILY     latanoprost 0.005 % ophthalmic solution  INSTILL 1 DROP INTO BOTH EYES EVERY DAY     levothyroxine 75 MCG tablet  Commonly known as:  SYNTHROID  Take 1 tablet (75 mcg total) by mouth before breakfast.     rosuvastatin 10 MG tablet  Commonly known as:  CRESTOR  Take 1 tablet (10 mg total) by mouth every evening.     sodium bicarbonate 650 MG tablet  Take 2 tablets (1,300 mg total) by mouth 2 (two) times daily.     VITAMIN D ORAL  Take by mouth.            Indwelling Lines/Drains at time of discharge:   Lines/Drains/Airways          No matching active lines, drains, or airways          Time spent on the discharge of patient: 35 minutes spent with patient going over medication schedule and answering questions.  Patient was seen and examined on the date of discharge and determined to be suitable for discharge.         Soo Petersen MD  Department of Hospital Medicine  [facility]

## 2018-05-03 NOTE — ASSESSMENT & PLAN NOTE
Appreciate Nephrology evaluation and recs  Cr worsened overnight due to transient hypotension.  Avoid hypotension

## 2018-05-03 NOTE — PLAN OF CARE
"Dr Petersen met with patient at bedside & she agreed to home health for nursing services only. Orders completed. Spoke with patient who is ok with "any agency that accepts [employer] as long as their office is not in [place] because I had a bad experience with them in the past." Patient could not recall they agency's name. Referral sent to [facility] via [facility] & they accepted. AVS updated. Patient requested another sitter list stating "i don't know what I did with the last one." Sitter list provided      05/03/18 1117   Final Note   Assessment Type Final Discharge Note   Discharge Disposition Home-Health   What phone number can be called within the next 1-3 days to see how you are doing after discharge? [phone]   Discharge plans and expectations educations in teach back method with documentation complete? Yes   [facility] [employer] Referral Info   Post Acute Recommendation Home-care   Facility Name [facility]     "

## 2018-05-03 NOTE — PLAN OF CARE
Problem: Patient Care Overview  Goal: Plan of Care Review  Outcome: Ongoing (interventions implemented as appropriate)  Pt in no distress on RA, prn tx not required. Bipap on standby . Will continue to monitor.

## 2018-05-03 NOTE — PLAN OF CARE
[facility]    HOME HEALTH ORDERS  FACE TO FACE ENCOUNTER    Patient Name: Ewelina Arnold  YOB: 1945    PCP: Kalie Walton MD   PCP Address: [address] [place] [address] [address] / [facility] [address]  PCP Phone Number: [phone]  PCP Fax: [phone]    Encounter Date: 05/03/2018    Admit to Home Health    Diagnoses:  Active Hospital Problems    Diagnosis  POA    *Acute diastolic congestive heart failure [I50.31]  Yes    Acute hypoxemic respiratory failure [J96.01]  Yes    Flash pulmonary edema [J81.0]  Yes    Nonrheumatic aortic valve stenosis [I35.0]  Yes     Patient with moderate aortic stenosis with HFpEF and Pulmonary HTN with NYHA FC III symptoms   Agree with IV diuresis. Will discuss with staff if BAV can be helpful in this scenerio. She denies angina and had recent echo with WMA making ischemic less likely etiology of her symptoms but cannot be completely excluded considering she had PCI of LAD in 2011 with no subsequent ischemic evaluation in this setting and now with change in clinical condition.   Recommend 2 Echo with doppler.      Anemia associated with chronic renal failure [D63.1]  Yes    Pulmonary hypertension [I27.20]  Yes    CKD (chronic kidney disease), stage IV [N18.4]  Yes     Donated kidney to her brother  1/23/2017 patient continues to follow-up with nephrology awaiting renal panel will contact patient results available.  We'll continue to monitor      Acquired hypothyroidism [E03.9]  Yes    Coronary artery disease due to calcified coronary lesion [I25.10, I25.84]  Yes    Resistant hypertension [I10]  Yes    Other forms of systemic lupus erythematosus [M32.8]  Yes     Chronic     Early 1990's developed acute respiratory failure and spent weeks in ICU [facility]   Positive CARYN, SSB  Hx Rash, leukopenia,         Resolved Hospital Problems    Diagnosis Date Resolved POA   No resolved problems to display.       Future Appointments  Date  Time Provider Department [place]   5/7/2018 9:00 AM GERALDINE Hernandez [facility] IMPRICL [address] PCW   5/11/2018 9:00 AM Bryn Sierra MD [facility] OPHTHAL [address]   5/16/2018 7:00 AM LAB, [facility] LAB [facility]   5/21/2018 8:00 AM Yeimi Burdick MD [facility] HEARTTX [address]   5/22/2018 10:00 AM Kalie Walton MD [facility]   6/27/2018 8:15 AM LAB, [facility] LAB [facility]   8/21/2018 10:00 AM Marky Mccray MD [facility] OPHTHAL [address]     Follow-up Information     Kalie Walton MD In 1 week.    Specialty:  Family Medicine  Why:  hospital follow-up  Contact information:  [address]  [address]  [facility] [address] [phone]                     I have seen and examined this patient face to face today. My clinical findings that support the need for the home health skilled services and home bound status are the following:  Patient with medication mismanagement issues requiring home bound status as evidenced by  Unstable vital signs (blood pressure, heart rate) and Poor understanding of medication regimen/dosage.    Allergies:  Review of patient's allergies indicates:   Allergen Reactions    Ace inhibitors Swelling     Lips Swelling    Vioxx [rofecoxib] Swelling      lips swelling    Bactrim [sulfamethoxazole-trimethoprim]      Pt would like this medication to be added due to elevation of creatinine with single kidney.    Clonidine Hallucinations     Hallucinations    Lactose     Arthrotec 50 [diclofenac-misoprostol]      Unknown    Bentyl [dicyclomine]      Not effective    Doxycycline Nausea Only    Lomotil [diphenoxylate-atropine] Nausea And Vomiting     Stomach cramps with vomitting    Lozol [indapamide] Rash    Norvasc [amlodipine]      Not tolerated    Tramadol Nausea Only       Diet: cardiac diet and renal diet    Activities: activity as tolerated    Nursing:   SN to complete comprehensive assessment including routine vital signs.  Instruct on disease process and s/s of complications to report to MD. Review/verify medication list sent home with the patient at time of discharge  and instruct patient/caregiver as needed. Frequency may be adjusted depending on start of care date.    Notify MD if SBP > 160 or < 90; DBP > 90 or < 50; HR > 120 or < 50; Temp > 101;     MISCELLANEOUS CARE:  N/A    WOUND CARE ORDERS  n/a      Medications: Review discharge medications with patient and family and provide education.      Current Discharge Medication List      START taking these medications    Details   doxazosin (CARDURA) 4 MG tablet Take 1 tablet (4 mg total) by mouth once daily.  Qty: 30 tablet, Refills: 1      metOLazone (ZAROXOLYN) 2.5 MG tablet Take 2 tablets (5 mg total) by mouth daily as needed (Weigt gain >2Lbs.).  Qty: 60 tablet, Refills: 11         CONTINUE these medications which have CHANGED    Details   furosemide (LASIX) 80 MG tablet Take 1 tablet (80 mg total) by mouth 2 (two) times daily.  Qty: 60 tablet, Refills: 1      isosorbide mononitrate (IMDUR) 60 MG 24 hr tablet Take 1 tablet (60 mg total) by mouth once daily.  Qty: 30 tablet, Refills: 1      NIFEdipine (PROCARDIA-XL) 60 MG (OSM) 24 hr tablet Take 1 tablet (60 mg total) by mouth 2 (two) times daily.  Qty: 60 tablet, Refills: 1         CONTINUE these medications which have NOT CHANGED    Details   aspirin (ECOTRIN) 81 MG EC tablet Take 1 tablet (81 mg total) by mouth once daily.  Qty: 30 tablet, Refills: 0      carvedilol (COREG) 25 MG tablet Take 1 tablet (25 mg total) by mouth 2 (two) times daily.  Qty: 180 tablet, Refills: 1      citalopram (CELEXA) 20 MG tablet Take 1.5 tablets (30 mg total) by mouth once daily.  Qty: 135 tablet, Refills: 1      ERGOCALCIFEROL, VITAMIN D2, (VITAMIN D ORAL) Take by mouth.      famotidine (PEPCID) 20 MG tablet Take 1 tablet (20 mg total) by mouth once daily.  Qty: 90 tablet, Refills: 1      hydrALAZINE (APRESOLINE) 100 MG tablet Take 1 tablet (100  mg total) by mouth every 12 (twelve) hours.  Qty: 180 tablet, Refills: 1    Associated Diagnoses: Acute kidney injury superimposed on chronic kidney disease; Renovascular hypertension      hydroxychloroquine (PLAQUENIL) 200 mg tablet TAKE 1 TABLET TWICE DAILY  Qty: 180 tablet, Refills: 1    Associated Diagnoses: Lupus (systemic lupus erythematosus)      latanoprost 0.005 % ophthalmic solution INSTILL 1 DROP INTO BOTH EYES EVERY DAY  Qty: 3 Bottle, Refills: 3      levothyroxine (SYNTHROID) 75 MCG tablet Take 1 tablet (75 mcg total) by mouth before breakfast.  Qty: 30 tablet, Refills: 0    Associated Diagnoses: Hypothyroidism, unspecified type      rosuvastatin (CRESTOR) 10 MG tablet Take 1 tablet (10 mg total) by mouth every evening.  Qty: 90 tablet, Refills: 3      sodium bicarbonate 650 MG tablet Take 2 tablets (1,300 mg total) by mouth 2 (two) times daily.  Qty: 120 tablet, Refills: 0    Associated Diagnoses: Metabolic acidosis, normal anion gap (NAG); Hyperkalemia             I certify that this patient is confined to her home and needs intermittent skilled nursing care.

## 2018-05-03 NOTE — NURSING
Discharge instructions reviewed and written copy provided, meds, s/s of heart failure and when to call the doctor. Review of daily weighing and logging. Appointments reviewed. Verbalized understanding. All questions and answered.

## 2018-05-04 NOTE — PT/OT/SLP DISCHARGE
Physical Therapy Discharge Summary    Name: Ewelina Arnold  MRN: [id number]   Principal Problem: Acute on chronic diastolic congestive heart failure     Patient Discharged from acute Physical Therapy on 5/3/18.  Please refer to prior PT noted date on 18 for functional status.     Assessment:     Goals partially met.    Objective:     GOALS:    Physical Therapy Goals        Problem: Physical Therapy Goal    Goal Priority Disciplines Outcome Goal Variances Interventions   Physical Therapy Goal     PT/OT, PT Ongoing (interventions implemented as appropriate)     Description:  Goals to be met by: 18     Patient will increase functional independence with mobility by performin. Supine to sit with supervision.   2. Sit to supine with supervision.   3. Sit<>stand transfer with supervision using rolling walker.   4. Gait > 150 feet with SBA using rolling walker.   5. Ascend/descend 7 stairs with bilateral handrails with CGA with or without AD.                    Reasons for Discontinuation of Therapy Services  Transfer to alternate level of care.      Plan:     Patient Discharged to: Home with Home Health Service.    Tanisha Navarro, PT  2018

## 2018-05-04 NOTE — TELEPHONE ENCOUNTER
I contacted the patient today, 5/4, regarding re-enrollment into the [facility] Digital Medicine team. She currently remains a patient of Dr. Gustabo Brizuela who is a nephrologist outside of [facility]. He is monitoring her blood pressure and adjusting medications outside of [facility]. The patient does not want two clinicians adjusting her blood pressure and choose to stay with Dr. Brizuela at this time. She has my contact information if she decides to resume care in the Digital Medicine program.

## 2018-05-07 ENCOUNTER — TELEPHONE (OUTPATIENT)
Dept: INTERNAL MEDICINE | Facility: CLINIC | Age: 73
End: 2018-05-07

## 2018-05-07 ENCOUNTER — OUTPATIENT CASE MANAGEMENT (OUTPATIENT)
Dept: ADMINISTRATIVE | Facility: OTHER | Age: 73
End: 2018-05-07

## 2018-05-07 ENCOUNTER — OFFICE VISIT (OUTPATIENT)
Dept: PRIMARY CARE CLINIC | Facility: CLINIC | Age: 73
End: 2018-05-07
Payer: MEDICARE

## 2018-05-07 VITALS
WEIGHT: 114.19 LBS | OXYGEN SATURATION: 96 % | HEIGHT: 65 IN | HEART RATE: 62 BPM | SYSTOLIC BLOOD PRESSURE: 94 MMHG | DIASTOLIC BLOOD PRESSURE: 52 MMHG | BODY MASS INDEX: 19.03 KG/M2

## 2018-05-07 DIAGNOSIS — E78.5 DYSLIPIDEMIA: ICD-10-CM

## 2018-05-07 DIAGNOSIS — I27.20 PULMONARY HYPERTENSION: ICD-10-CM

## 2018-05-07 DIAGNOSIS — E03.9 ACQUIRED HYPOTHYROIDISM: ICD-10-CM

## 2018-05-07 DIAGNOSIS — I25.84 CORONARY ARTERY DISEASE DUE TO CALCIFIED CORONARY LESION: ICD-10-CM

## 2018-05-07 DIAGNOSIS — I50.33 ACUTE ON CHRONIC DIASTOLIC CONGESTIVE HEART FAILURE: Primary | ICD-10-CM

## 2018-05-07 DIAGNOSIS — I51.89 LEFT VENTRICULAR DIASTOLIC DYSFUNCTION WITH PRESERVED SYSTOLIC FUNCTION: ICD-10-CM

## 2018-05-07 DIAGNOSIS — N18.4 CKD (CHRONIC KIDNEY DISEASE), STAGE IV: ICD-10-CM

## 2018-05-07 DIAGNOSIS — I25.10 CORONARY ARTERY DISEASE DUE TO CALCIFIED CORONARY LESION: ICD-10-CM

## 2018-05-07 DIAGNOSIS — I1A.0 RESISTANT HYPERTENSION: ICD-10-CM

## 2018-05-07 PROCEDURE — 99499 UNLISTED E&M SERVICE: CPT | Mod: S$PBB,,, | Performed by: NURSE PRACTITIONER

## 2018-05-07 PROCEDURE — 99214 OFFICE O/P EST MOD 30 MIN: CPT | Mod: S$GLB,,, | Performed by: NURSE PRACTITIONER

## 2018-05-07 PROCEDURE — 99999 PR PBB SHADOW E&M-EST. PATIENT-LVL IV: CPT | Mod: PBBFAC,,, | Performed by: NURSE PRACTITIONER

## 2018-05-07 PROCEDURE — 3078F DIAST BP <80 MM HG: CPT | Mod: CPTII,S$GLB,, | Performed by: NURSE PRACTITIONER

## 2018-05-07 PROCEDURE — 3074F SYST BP LT 130 MM HG: CPT | Mod: CPTII,S$GLB,, | Performed by: NURSE PRACTITIONER

## 2018-05-07 NOTE — TELEPHONE ENCOUNTER
Spoke with Ms. Theodore and she understands, the results in regards to her labs today, and the plans moving forward. She was instructed to stop the Cardura.   Suggest repeat labs in 1 week; will defer to [facility].   Scheduled to be seen and establish care with Dr. Johns on 5/18/2018, and in agreement.    Complex case and requires collaboration of care with all providers involved.     RENE

## 2018-05-07 NOTE — PATIENT INSTRUCTIONS
1) Will contact you with results of labs drawn in clinic today.       Priority Clinic Visit (Post Discharge Follow-up) Today:   - Our clinic physicians and nurses plan to follow the patient up for any medical issues in the Priority Clinic for 30 days post discharge.    Future Appointments  Date Time Provider Department [place]   5/7/2018 10:20 AM LAB, APPOINTMENT [facility] INTMED [facility] LAB IM [address] PCW   5/11/2018 9:00 AM Bryn Sierra MD [facility] OPHTHAL [address]   5/16/2018 7:00 AM LAB, [facility] LAB [facility]   5/21/2018 8:00 AM Yeimi Burdick MD [facility] HEARTTX [address] [facility]   5/22/2018 10:00 AM Kalie Walton MD [facility]   5/23/2018 10:20 AM Tanner Guzmán MD (colleague of Dr. Epperson) [facility] NEPHRO [address]   6/27/2018 8:15 AM LAB, [facility] LAB [facility]   8/21/2018 10:00 AM Marky Mccray MD [facility] OPHTHAL [address]

## 2018-05-07 NOTE — PROGRESS NOTES
5/7/18  Summary:  Chart reviewed in epic.  Noted blood pressure 164/74 this am.  Pt states she is a little tired today.      Interventions  hbp  Pt bp in clinic today was 94/52.  Pt states she feels ok.  Advised pt that eventhough her blood pressure is low she could still have excess fluid in lung, abd or peripheral areas that can create the pulmonary edema and hypertension crisis she is having.  Discussed this process at length.  States no fluid in legs today.  Advised on what chf is as below.    chf pt was seen in the priority clinic today.  Labs ordered and are processing.  Pt wt 114 bp 94/52.  Pt is scheduled to see dr. Burdick.  Advised patient that her chf and hypertension are related but only treating hypertension will not cure or prevent the chf.  This is a multifaceted problem that will need attention of Nephrology, cardiology and internal medicine for optimal care.  Advised that in the [facility] with dr. WENDY Johns she will be able to coordinate with the mentioned specialist and advise accordingly.  Advised that dr. johns has  Already started the coordination process by contacting dr. capellan (interventional cardiology), Dr. Burdick (transplant/heart failure clinic) and discussed your case.  She plans to contact nephrology as well.  Pt consented to see dr. Johns.   Message to dr. johns advising and she states they will reach out to pt  Review chf diagnosis, signs and symptoms with pt and she verbalized understanding.  Reviewed importance of daily wt and advising md of 3 pound wt gain in a day or 5 pounds in a week and she verbalized understanding.  Advised to call dr. Burgess until she sees dr. johns.  Reviewed low sodium diet and daily recommended amount of sodium.  Pt verbalized understanding.  Letter in mail along with examples of low sodium products as well as krames information on reading labels and low salt choices.    Plan  Chf  Follow up on appointment with   carstarphen and faust    HBP  Daily bp  Daily wt  Follow up on receipt of education material

## 2018-05-07 NOTE — PATIENT INSTRUCTIONS
Low-Salt Choices  Eating salt (sodium) can make your body retain too much water. Excess water makes your heart work harder. Canned, packaged, and frozen foods are easy to prepare, but they are often high in sodium. Here are some ideas for low-salt foods you can easily prepare yourself.    For breakfast  · Fruit or 100% fruit juice  · Whole-wheat bread or an English muffin. Compare sodium content on labels.  · Low-fat milk or yogurt  · Unsalted eggs  · Shredded wheat  · Corn tortillas  · Unsalted steamed rice  · Regular (not instant) hot cereal, made without salt  Stay away from:  · Sausage, chiu, and ham  · Flour tortillas  · Packaged muffins, pancakes, and biscuits  · Instant hot cereals  · Cottage cheese  For lunch and dinner  · Fresh fish, chicken, turkey, or meat--baked, broiled, or roasted without salt  · Dry beans, cooked without salt  · Tofu, stir-fried without salt  · Unsalted fresh fruit and vegetables, or frozen or canned fruit and vegetables with no added salt  Stay away from:  · Lunch or deli meat that is cured or smoked  · Cheese  · Tomato juice and catsup  · Canned vegetables, soups, and fish not labeled as no-salt-added or reduced sodium  · Packaged gravies and sauces  · Olives, pickles, and relish  · Bottled salad dressings  For snacks and desserts  · Yogurt  · Unsalted, air popped popcorn  · Unsalted nuts or seeds  Stay away from:  · Pies and cakes  · Packaged dessert mixes  · Pizza  · Canned and packaged puddings  · Pretzels, chips, crackers, and nuts--unless the label says unsalted  Date Last Reviewed: 6/17/2015  © 9746-9754 [employer]. [address] [place] [address], [place] [address]. All rights reserved. This information is not intended as a substitute for professional medical care. Always follow your healthcare professional's instructions.        Discharge Instructions: Eating a Low-Salt Diet  Your health care provider has prescribed a low-salt diet for you. Most people with heart  problems need to eat less salt, which is full of sodium. Too much sodium is linked to high blood pressure, which is linked to a greater risk of heart disease, stroke, blindness, and kidney problems.  Home care    Learn ways to cut back on salt (sodium):  · Eat less frozen, canned, dried, packaged, and fast foods. These often contain high amounts of sodium.  · Season foods with herbs instead of salt when you cook.  · Season with flavorings such as pepper, lemon, garlic, and onion.  · Dont add salt to your food at the table.  · Sprinkle salt-free herbal blends on meats and vegetables.  Learn to read food labels carefully:  · Look for the total amount of sodium per serving.  · Look for foods labeled low sodium, reduced sodium, or no added salt.  · Beware. Salt goes by many names. Cut down on foods with these words (all forms of salt) listed as ingredients:  ¨ Salt  ¨ Sodium  ¨ Soy sauce  ¨ Baking soda  ¨ Baking powder  ¨ MSG  ¨ Monosodium  ¨ Na (the chemical symbol for sodium)  Other ideas:  · Use more fresh food. Buy more fruits and vegetables.  · Select lean meats, fish, and poultry.  · Find a cookbook with low-salt recipes. Youll find ideas for tasty meals that are healthy for your heart.  ·   When eating out, ask questions about the menu. Tell the  you're on a low-salt diet.  ¨ If you order fish, chicken, beef, or pork, ask the  to have it broiled, baked, poached, or grilled without salt, butter, or breading.  ¨ Choose plain steamed rice, boiled noodles, and baked or boiled potatoes. Top potatoes with chives and a little sour cream instead of butter.  · Avoid antacids that are high in salt. Check the label before you buy.  Follow-up  Make a follow-up appointment with a nutritionist as directed by our staff.  Date Last Reviewed: 6/20/2015  © 6840-3870 [employer]. [address] [place] [address], [place] [address]. All rights reserved. This information is not intended as a substitute for  professional medical care. Always follow your healthcare professional's instructions.        Heart Failure: Making Changes to Your Diet  You have a condition called heart failure. When you have heart failure, excess fluid is more likely to build up in your body because your heart isn't working well. This makes the heart work harder to pump blood. Fluid buildup causes symptoms such as shortness of breath and swelling (edema). This is often referred to as congestive heart failure or CHF. Controlling the amount of salt (sodium) you eat may help stop fluid from building up. Your doctor may also tell you to reduce the amount of fluid you drink.  Reading food labels    Your healthcare provider will tell you how much sodium you can eat each day. Read food labels to keep track. Keep in mind that certain foods are high in salt. These include canned, frozen, and processed foods. Check the amount of sodium in each serving. Watch out for high-sodium ingredients. These include MSG (monosodium glutamate), baking soda, and sodium phosphate.   Eating less salt  Give yourself time to get used to eating less salt. It may take a little while. Here are some tips to help:  · Take the saltshaker off the table. Replace it with salt-free herb mixes and spices.  · Eat fresh or plain frozen vegetables. These have much less salt than canned vegetables.  · Choose low-sodium snacks like sodium-free pretzels, crackers, or air-popped popcorn.  · Dont add salt to your food when youre cooking. Instead, season your foods with pepper, lemon, garlic, or onion.  · When you eat out, ask that your food be cooked without added salt.  · Avoid eating fried foods as these often have a great deal of salt.  If youre told to limit fluids  You may need to limit how much fluid you have to help prevent swelling. This includes anything that is liquid at room temperature, such as ice cream and soup. If your doctor tells you to limit fluid, try these  tips:  · Measure drinks in a measuring cup before you drink them. This will help you meet daily goals.  · Chill drinks to make them more refreshing.  · Suck on frozen lemon wedges to quench thirst.  · Only drink when youre thirsty.  · Chew sugarless gum or suck on hard candy to keep your mouth moist.  · Weigh yourself daily to know if your body's fluid content is rising.  My sodium goal  Your healthcare provider may give you a sodium goal to meet each day. This includes sodium found in food as well as salt that you add. My goal is to eat no more than ___________ mg of sodium per day.     When to call your doctor  Call your doctor right away if you have any symptoms of worsening heart failure. These can include:  · Sudden weight gain  · Increased swelling of your legs or ankles  · Trouble breathing when youre resting or at night  · Increase in the number of pillows you have to sleep on  · Chest pain, pressure, discomfort, or pain in the jaw, neck, or back   Date Last Reviewed: 3/21/2016  © 2169-1662 [employer]. [address] [place] [address]. All rights reserved. This information is not intended as a substitute for professional medical care. Always follow your healthcare professional's instructions.

## 2018-05-07 NOTE — PROGRESS NOTES
[facility]  Follow-up Visit Progress Note     PRESENTING HISTORY     PCP: Kalie Walton MD  Chief Complaint/Reason for Visit:  Follow up from recent visit.      No chief complaint on file.      History of Present Illness & ROS: Ms. Ewelina Arnold is a 72 y.o. female.    Discharge Summaries  Soo Petersen MD   [facility]      []Hide copied text  []Hover for attribution information  [facility]  Discharge Summary        Patient Name: Ewelina Arnold  MRN: [id number]  Admission Date: 4/28/2018  Hospital Length of Stay: 5 days  Discharge Date and Time:  05/03/2018 10:47 AM  Attending Physician: Soo Petersen MD   Discharging Provider: Soo Petersen MD  Primary Care Provider: Kalie Walton MD        HPI:   This is a 72 year old female with suspected renal artery stenosis, hyperaldosteronism, CHF, HTN, CKD, stage IV and frequent hospitalizations for suspected flash pulmonary edema secondary to hypertensive emergencies presents with complaints of shortness of breath, difficulty breathing, and chest pain.  Upon arrival to the ED, the patient had a elevated BNP of 4697 and troponin I 0.21.  Systolic BP was up to the 230s systolic.       * No surgery found *       Hospital Course:   The patient is received IV Lasix 40mg Q8 and has diuresed 1.5L.  Bipap has been discontinued and the patient has a very stable respiratory status.  Nephrology consulted and started po Lasix 80mg BID and Metolazone.  She has difficult to control BP and slowly titrating medications for improved control but had issues with low BP and dropping with activity so medications had to be readjusted. The patient has difficulty with minimal exertion and PT/OT will assist patient in assessing ADLs and discharge planning. Her BP finally stabilized and she is agreeable to [facility] with skilled RN. She will f/u closely outpatient.       Consults:          Consults          Status Ordering  Provider       Inpatient consult to Cardiology  Once     Provider:  Risa Lea MD    Completed MASTER FALLON JR       Inpatient consult to Nephrology-[facility]  Once     Provider:  Master Fallon Jr., MD    Completed LIEN LEARY       Inpatient consult to Registered Dietitian/Nutritionist  Once     Provider:  (Not yet assigned)    Completed LIEN LEARY       Inpatient consult to Spiritual Care  Once     Provider:  (Not yet assigned)    Acknowledged LIEN LEARY                 * Acute on chronic diastolic congestive heart failure     Nephrology to manage diuretic therapy  Improved clinical status  Strict I/Os                   Acute hypoxemic respiratory failure     Resolved  Supplemental oxygen to maintain sats >92%  Continuous Bipap 12/5 discontinued             Flash pulmonary edema     Due to uncontrolled hypertension  Nephrology adjusting diuretic regimen  Supplemental oxygen PRN             Nonrheumatic aortic valve stenosis     The patient was previously not a candidate for TAVR based on severity of valvular disease  Continue to try and maximize medical management             Anemia associated with chronic renal failure     Hb remains stable. Monitor             Pulmonary hypertension     No acute management needed             CKD (chronic kidney disease), stage IV     Appreciate Nephrology evaluation and recs  Cr worsened overnight due to transient hypotension.  Avoid hypotension              Acquired hypothyroidism     Continue usual thyroid supplementation             Resistant hypertension        Cr increased due to renal hypoperfusion from hypotension  BP stable on current regimen  Monitor closely. Hold parameters placed on meds             Coronary artery disease due to calcified coronary lesion     Mildly elevated troponin I initially and has trended downward  Doubt ACS                Other forms of systemic lupus erythematosus     No acute management needed                         Final Active Diagnoses:     Diagnosis Date Noted POA    PRINCIPAL PROBLEM:  Acute on chronic diastolic congestive heart failure [I50.33] 04/28/2018 Yes    Acute hypoxemic respiratory failure [J96.01] 04/28/2018 Yes    Flash pulmonary edema [J81.0] 04/12/2018 Yes    Nonrheumatic aortic valve stenosis [I35.0] 11/18/2017 Yes    Anemia associated with chronic renal failure [D63.1] 07/25/2017 Yes    Pulmonary hypertension [I27.20] 05/16/2017 Yes    CKD (chronic kidney disease), stage IV [N18.4] 01/23/2017 Yes    Acquired hypothyroidism [E03.9] 05/13/2015 Yes    Coronary artery disease due to calcified coronary lesion [I25.10, I25.84] 10/05/2012 Yes    Resistant hypertension [I10] 10/05/2012 Yes    Other forms of systemic lupus erythematosus [M32.8] 08/17/2012 Yes       Chronic       Problems Resolved During this Admission:     Diagnosis Date Noted Date Resolved POA         Discharged Condition: good     Disposition: [facility]-Health Care [facility]     Follow Up:      Follow-up Information      Kalie Walton MD In 1 week.    Specialty:  Family Medicine  Why:  hospital follow-up  Contact information:  [address]  [address]  [place] [id number] [phone]                      Patient Instructions:      Diet Cardiac      Diet renal      Activity as tolerated          Significant Diagnostic Studies: Labs:   BMP:   Recent Labs  Lab 05/02/18  0454 05/03/18  0511   GLU 74 76    137   K 3.7 3.5   CL 99 98   CO2 27 27   BUN 54* 58*   CREATININE 3.3* 3.5*   CALCIUM 8.4* 8.3*   MG 1.6 1.7    and CBC No results for input(s): WBC, HGB, HCT, PLT in the last 48 hours.         Pending Diagnostic Studies:      None          Medications:  Reconciled Home Medications:       Medication List       START taking these medications    butalbital-acetaminophen-caffeine -40 mg -40 mg per tablet  Commonly known as:  FIORICET, ESGIC  Take 1 tablet by mouth every 6 (six) hours as needed.       doxazosin 4 MG tablet  Commonly known as:  CARDURA  Take 1 tablet (4 mg total) by mouth once daily.  Start taking on:  5/4/2018      metOLazone 2.5 MG tablet  Commonly known as:  ZAROXOLYN  Take 2 tablets (5 mg total) by mouth daily as needed (Weigt gain >2Lbs.).          CHANGE how you take these medications    furosemide 80 MG tablet  Commonly known as:  LASIX  Take 1 tablet (80 mg total) by mouth 2 (two) times daily.  What changed:  · medication strength  · how much to take  · when to take this      isosorbide mononitrate 60 MG 24 hr tablet  Commonly known as:  IMDUR  Take 1 tablet (60 mg total) by mouth once daily.  What changed:  · medication strength  · how much to take      NIFEdipine 60 MG (OSM) 24 hr tablet  Commonly known as:  PROCARDIA-XL  Take 1 tablet (60 mg total) by mouth 2 (two) times daily.  What changed:  · medication strength  · how much to take  · when to take this          CONTINUE taking these medications    aspirin 81 MG EC tablet  Commonly known as:  ECOTRIN  Take 1 tablet (81 mg total) by mouth once daily.      carvedilol 25 MG tablet  Commonly known as:  COREG  Take 1 tablet (25 mg total) by mouth 2 (two) times daily.      citalopram 20 MG tablet  Commonly known as:  CELEXA  Take 1.5 tablets (30 mg total) by mouth once daily.      famotidine 20 MG tablet  Commonly known as:  PEPCID  Take 1 tablet (20 mg total) by mouth once daily.      hydrALAZINE 100 MG tablet  Commonly known as:  APRESOLINE  Take 1 tablet (100 mg total) by mouth every 12 (twelve) hours.      hydroxychloroquine 200 mg tablet  Commonly known as:  PLAQUENIL  TAKE 1 TABLET TWICE DAILY      latanoprost 0.005 % ophthalmic solution  INSTILL 1 DROP INTO BOTH EYES EVERY DAY      levothyroxine 75 MCG tablet  Commonly known as:  SYNTHROID  Take 1 tablet (75 mcg total) by mouth before breakfast.      rosuvastatin 10 MG tablet  Commonly known as:  CRESTOR  Take 1 tablet (10 mg total) by mouth every evening.      sodium bicarbonate  650 MG tablet  Take 2 tablets (1,300 mg total) by mouth 2 (two) times daily.      VITAMIN D ORAL  Take by mouth.                Indwelling Lines/Drains at time of discharge:       Lines/Drains/Airways            No matching active lines, drains, or airways             Time spent on the discharge of patient: 35 minutes spent with patient going over medication schedule and answering questions.  Patient was seen and examined on the date of discharge and determined to be suitable for discharge.           Soo Petersen MD  Department of Hospital Medicine  [facility]      Electronically signed by Soo Petersen MD at 5/3/2018 10:48 AM        ED to Hosp-Admission (Discharged) on 4/28/2018            Routing History            Detailed Report           Note shared with patient     Today;   Ms. Theodore is a pleasant 72 year old female, who is here for hospital follow up today.   Taking all medications as prescribed at discharge.   Complains of some light headedness.   Checking blood pressure at home, but did not bring log.   Denies coughing, sob, chest discomforts, headaches, muscle spasms or joint discomforts.     Review of Systems:  Eyes: denies visual changes at this time denies floaters   ENT: no nasal congestion or sore throat  Respiratory: no cough or shorness of breath  Cardiovascular: no chest pain or palpitations  Gastrointestinal: no nausea or vomiting, no abdominal pain or change in bowel habits  Genitourinary: no hematuria or dysuria; denies frequency  Hematologic/Lymphatic: no easy bruising or lymphadenopathy  Musculoskeletal: no arthralgias or myalgias  Neurological: no seizures or tremors  Endocrine: no heat or cold intolerance      PAST HISTORY:     Past Medical History:   Diagnosis Date    Acute hypoxemic respiratory failure 4/28/2018    Acute on chronic diastolic congestive heart failure 11/17/2017    Allergy     Anatomical narrow angle glaucoma     Anemia     States diagnosed  about a month ago    Aortic atherosclerosis     Arthritis     Cataract     CHF (congestive heart failure)     Chronic kidney disease     Chronic sciatica of left side     Right lower back pain due to sciatica    Coronary artery disease     Depression     Dry eyes     GERD (gastroesophageal reflux disease)     History of colon cancer     Hyperaldosteronism     Hyperlipidemia     Hypertension     Hypothyroidism     Left ventricular diastolic dysfunction with preserved systolic function     Lupus (systemic lupus erythematosus) 8/17/2012    Malnutrition 5/16/2017    Osteoarthritis of cervical spine 8/17/2012    Osteopenia     PAD (peripheral artery disease)     FRAN (renal artery stenosis)        Past Surgical History:   Procedure Laterality Date    CARDIAC CATHETERIZATION  07/27/2011    x1    CHOLECYSTECTOMY  1999    COLON SURGERY  2000 & 2003    partial removal    COLONOSCOPY N/A 4/14/2016    Procedure: COLONOSCOPY;  Surgeon: Jose Steen MD;  Location: [facility] ENDO (4TH FLR);  Service: Endoscopy;  Laterality: N/A;  prep 2 days prior light meals only/clear liquid day before  and 4 dulcolax tabs (5 mgs) at noon    COLONOSCOPY N/A 9/14/2017    Procedure: COLONOSCOPY;  Surgeon: Jose Steen MD;  [facility]  Service: Endoscopy;  Laterality: N/A;    EYE SURGERY      HAND SURGERY Bilateral 1996 & 1997    due to carpal tunnel     HERNIA REPAIR      HYSTERECTOMY  1983    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    OOPHORECTOMY      removed one    Peripheral Iridotomy both eyes  1994    laser angle correction    THYROIDECTOMY, PARTIAL  2006    to remove two nodules and one-half thyroid       Family History   Problem Relation Age of Onset    Heart attack Mother     Hypertension Mother     Heart disease Father     Hypertension Father     Diabetes Maternal Grandmother     Glaucoma Maternal Grandmother     Hypertension Sister     Kidney disease Brother     Kidney  failure Brother         received donated kidney from sister    No Known Problems Daughter     Diabetes Son     Hypertension Brother     Diabetes Maternal Aunt     No Known Problems Maternal Grandfather     No Known Problems Paternal Grandmother     No Known Problems Paternal Grandfather     Acne Neg Hx     Eczema Neg Hx     Lupus Neg Hx     Psoriasis Neg Hx     Melanoma Neg Hx     Amblyopia Neg Hx     Blindness Neg Hx     Cataracts Neg Hx     Macular degeneration Neg Hx     Retinal detachment Neg Hx     Strabismus Neg Hx        Social History     Social History    Marital status: Single     Spouse name: N/A    Number of children: N/A    Years of education: N/A     Occupational History     Retired      Social History Main Topics    Smoking status: Former Smoker     Packs/day: 1.50     Years: 30.00     Types: Cigarettes     Start date: 1955     Quit date: 3/13/1985    Smokeless tobacco: Never Used    Alcohol use No    Drug use: No    Sexual activity: Not Currently     Partners: Male     Birth control/ protection: Abstinence     Other Topics Concern    Not on file     Social History Narrative    No narrative on file       MEDICATIONS & ALLERGIES:     Current Outpatient Prescriptions on File Prior to Visit   Medication Sig Dispense Refill    aspirin (ECOTRIN) 81 MG EC tablet Take 1 tablet (81 mg total) by mouth once daily. 30 tablet 0    butalbital-acetaminophen-caffeine -40 mg (FIORICET, ESGIC) -40 mg per tablet Take 1 tablet by mouth every 6 (six) hours as needed. 30 tablet 0    carvedilol (COREG) 25 MG tablet Take 1 tablet (25 mg total) by mouth 2 (two) times daily. 180 tablet 1    citalopram (CELEXA) 20 MG tablet Take 1.5 tablets (30 mg total) by mouth once daily. 135 tablet 1    doxazosin (CARDURA) 4 MG tablet Take 1 tablet (4 mg total) by mouth once daily. 30 tablet 1    ERGOCALCIFEROL, VITAMIN D2, (VITAMIN D ORAL) Take by mouth.      famotidine (PEPCID) 20 MG tablet  Take 1 tablet (20 mg total) by mouth once daily. 90 tablet 1    furosemide (LASIX) 80 MG tablet Take 1 tablet (80 mg total) by mouth 2 (two) times daily. 60 tablet 1    hydrALAZINE (APRESOLINE) 100 MG tablet Take 1 tablet (100 mg total) by mouth every 12 (twelve) hours. 180 tablet 1    hydroxychloroquine (PLAQUENIL) 200 mg tablet TAKE 1 TABLET TWICE DAILY 180 tablet 1    isosorbide mononitrate (IMDUR) 60 MG 24 hr tablet Take 1 tablet (60 mg total) by mouth once daily. 30 tablet 1    latanoprost 0.005 % ophthalmic solution INSTILL 1 DROP INTO BOTH EYES EVERY DAY 3 Bottle 3    levothyroxine (SYNTHROID) 75 MCG tablet Take 1 tablet (75 mcg total) by mouth before breakfast. 30 tablet 0    metOLazone (ZAROXOLYN) 2.5 MG tablet Take 2 tablets (5 mg total) by mouth daily as needed (Weigt gain >2Lbs.). 60 tablet 11    NIFEdipine (PROCARDIA-XL) 60 MG (OSM) 24 hr tablet Take 1 tablet (60 mg total) by mouth 2 (two) times daily. 60 tablet 1    rosuvastatin (CRESTOR) 10 MG tablet Take 1 tablet (10 mg total) by mouth every evening. 90 tablet 3    sodium bicarbonate 650 MG tablet Take 2 tablets (1,300 mg total) by mouth 2 (two) times daily. 120 tablet 0     No current facility-administered medications on file prior to visit.         Review of patient's allergies indicates:   Allergen Reactions    Ace inhibitors Swelling     Lips Swelling    Vioxx [rofecoxib] Swelling      lips swelling    Bactrim [sulfamethoxazole-trimethoprim]      Pt would like this medication to be added due to elevation of creatinine with single kidney.    Clonidine Hallucinations     Hallucinations    Lactose     Arthrotec 50 [diclofenac-misoprostol]      Unknown    Bentyl [dicyclomine]      Not effective    Doxycycline Nausea Only    Lomotil [diphenoxylate-atropine] Nausea And Vomiting     Stomach cramps with vomitting    Lozol [indapamide] Rash    Norvasc [amlodipine]      Not tolerated    Tramadol Nausea Only       Medications  Reconciliation:   I have reconciled the patient's home medications and discharge medications with the patient/family. I have updated all changes.  Refer to After-Visit Medication List.    OBJECTIVE:     Vital Signs:  There were no vitals filed for this visit.  Wt Readings from Last 1 Encounters:   05/03/18 0900 51.7 kg (113 lb 15.7 oz)   04/30/18 1200 52.9 kg (116 lb 10 oz)   04/30/18 0500 52.9 kg (116 lb 10 oz)   04/28/18 1515 56.5 kg (124 lb 9 oz)   04/28/18 0909 49.9 kg (110 lb)     There is no height or weight on file to calculate BMI.        Wt Readings from Last 3 Encounters:   05/07/18 51.8 kg (114 lb 3.2 oz)   05/03/18 51.7 kg (113 lb 15.7 oz)   04/19/18 54.9 kg (121 lb 0.5 oz)     Temp Readings from Last 3 Encounters:   05/03/18 98.3 °F (36.8 °C) (Oral)   04/19/18 98.6 °F (37 °C) (Oral)   04/15/18 98.2 °F (36.8 °C) (Oral)     BP Readings from Last 3 Encounters:   05/07/18 (!) 94/52   05/03/18 (!) 184/74   04/19/18 (!) 140/60     Pulse Readings from Last 3 Encounters:   05/07/18 62   05/03/18 69   04/19/18 67       Physical Exam:  General: Well developed, well nourished. No distress.  HEENT: Head is normocephalic, atraumatic; ears are normal.   Eyes: Clear conjunctiva.  Neck: Supple, symmetrical neck; trachea midline. No JVD  Lungs: Clear to auscultation bilaterally and normal respiratory effort.  Cardiovascular: Heart with regular rate and rhythm. No murmurs, gallops or rubs  Extremities: No LE edema. Pulses 2+ and symmetric.   Abdomen: Abdomen is soft, non-tender non-distended with normal bowel sounds.  Skin: Skin color, texture, turgor normal. No rashes.  Musculoskeletal: Normal gait.   Lymph Nodes: No cervical or supraclavicular adenopathy.  Neurologic: No focal numbness or weakness.   Psychiatric: Not depressed.        Laboratory  Lab Results   Component Value Date    WBC 5.41 04/28/2018    HGB 8.6 (L) 04/28/2018    HCT 27.1 (L) 04/28/2018     04/28/2018    CHOL 93 (L) 03/20/2018    TRIG 59  03/20/2018    HDL 33 (L) 03/20/2018    ALT 19 04/28/2018    AST 39 04/28/2018     05/03/2018    K 3.5 05/03/2018    CL 98 05/03/2018    CREATININE 3.5 (H) 05/03/2018    BUN 58 (H) 05/03/2018    CO2 27 05/03/2018    TSH 0.885 04/02/2018    INR 1.0 04/28/2018    GLUF 79 12/02/2011    HGBA1C 5.7 01/24/2017       Date of Procedure: 04/02/2018        TEST DESCRIPTION   Technical Quality: This is a technically adequate study.     Aorta: The aortic root is normal in size, measuring 1.9 cm at sinotubular junction and 2.5 cm at Sinuses of Valsalva.     Left Atrium: The left atrial volume index is severely enlarged, measuring 58.91 cc/m2.     Left Ventricle: The left ventricle is normal in size, with an end-diastolic diameter of 4.2 cm, and an end-systolic diameter of 2.8 cm. Wall thickness is markedly increased, with the septum and the posterior wall each measuring 1.6 cm across. Relative   wall thickness was increased at 0.76, and the LV mass index was increased at 209.4 g/m2 consistent with concentric left ventricular hypertrophy. There are no regional wall motion abnormalities. Left ventricular systolic function appears low normal to   mildly depressed. Visually estimated ejection fraction is 50-55%. The LV Doppler derived stroke volume equals 57.0 ccs.     Diastolic indices: E wave velocity 1.1 m/s, E/A ratio 1.7,  msec., E/e' ratio(avg) 15. There is pseudonormalization of mitral inflow pattern consistent with significant diastolic dysfunction.     Right Atrium: The right atrium is moderately enlarged, measuring 6.7 cm in length and 3.9 cm in width in the apical view.     Right Ventricle: The right ventricle is normal in size. Global right ventricular systolic function appears normal. There is abnormal septal motion consistent with RV pressure/volume overload. The estimated PA systolic pressure is 52 mmHg.     Aortic Valve:  The aortic valve is moderately sclerotic with moderately restricted leaflet mobility.  The aortic valve is tri-leaflet in structure. The peak velocity obtained across the aortic valve is 3.27 m/s, which translates to a peak gradient of 43   mmHg. The mean gradient is 16 mmHg. Using a left ventricular outflow tract diameter of 2.0 cm, a left ventricular outflow tract velocity time integral of 18 cm, and a peak instantaneous transvalvular velocity time integral of 60 cm, the calculated aortic   valve area is 0.94 cm2(AVAi is 0.59 cm2/m2), consistent with moderate to severe aortic stenosis. Additionally, there is mild aortic regurgitation.     Mitral Valve:  The mitral valve is mildly sclerotic. There is mild to moderate mitral regurgitation.     Tricuspid Valve:  The tricuspid valve is normal in structure. There is mild tricuspid regurgitation.     Pulmonary Valve:  The pulmonic valve is normal in structure.     Pericardium: There is evidence of a trivial circumferential pericardial effusion.     IVC: IVC is enlarged but collapses > 50% with a sniff, suggesting intermediate right atrial pressure of 8 mmHg.     Intracavitary: There is no evidence of intracavitary mass or thrombus. There is no evidence of vegetation.         CONCLUSIONS     1 - Concentric hypertrophy.     2 - Low normal to mildly depressed left ventricular systolic function (EF 50-55%).     3 - No wall motion abnormalities.     4 - Impaired LV relaxation, elevated LAP (grade 2 diastolic dysfunction).     5 - Biatrial enlargement.     6 - Moderate to severe aortic stenosis, PABLITO = 0.94 cm2, AVAi = 0.59 cm2/m2, peak velocity = 3.27 m/s, mean gradient = 16 mmHg.     7 - Mild aortic regurgitation.     8 - Mild to moderate mitral regurgitation.     9 - Pulmonary hypertension. The estimated PA systolic pressure is 52 mmHg.     10 - Trivial pericardial effusion.             This document has been electronically    SIGNED BY: Risa Lea MD On: 04/02/2018 15:51        Ref Range & Units 1mo ago 5mo ago 6mo ago 11mo ago 2yr ago    EF 55 - 65 50   55  60  68  65     Mitral Valve Regurgitation  MILD TO MODERATE  MILD  TRIVIAL TO MILD  MILD  MILD     Diastolic Dysfunction  Yes   Yes   Yes   Yes   Yes      Aortic Valve Regurgitation  MILD  TRIVIAL TO MILD  TRIVIAL TO MILD  TRIVIAL TO MILD  MILD     Aortic Valve Stenosis  MODERATE TO SEVERE   MODERATE   MODERATE TO SEVERE   MODERATE TO SEVERE   MILD TO MODERATE      Est. PA Systolic Pressure  52.36   68.29   57.77   30.04  47      Pericardial Effusion  TRIVIAL  SMALL   TRIVIAL       Tricuspid Valve Regurgitation  MILD  MILD   MILD  TRIVIAL    Resulting Agency  CVIS CVIS CVIS CVIS CVIS      Specimen Collected: 04/02/18 14:18 Last Resulted: 04/02/18 15:59            EXAMINATION:  XR CHEST PA AND LATERAL    CLINICAL HISTORY:  SOB/CHF;    TECHNIQUE:  PA and lateral views of the chest were performed.    COMPARISON:  04/28/2018    FINDINGS:  There is intervally reduced ill-defined perihilar and bilateral lung opacities suggestive for improving vascular congestion and edema.  Persistent poorly defined lung bases with bibasilar opacities concerning for effusions with atelectasis.  There is no large pneumothorax.  Heart size similar to prior continued atherosclerotic aorta.  Clinical correlation and continued follow-up advised   Impression       See above      Electronically signed by: Saul Pineda DO  Date: 04/30/2018  Time: 15:07         ASSESSMENT & PLAN:     HIGH RISK CONDITION(S):      Recent admission for Acute on chronic respiratory failure 2/2 Pulmonary HTN, and CHF Exacerbation:   (no clinical evidence of cardiac decompensation on today's exam)  *Reserve of 50%, PAP 52, + Grade 2 DD  *BNP: 3692 on admit (5/1)  *No criteria met for TAVR per HTS (needs follow up)  - continue Lasix 80/80 (increased from 40; may need to adjust given low BP and complaints of lightheadedness today; ? BP vs ? Renal vs ? Multifactorial related)  - continue Zaroxolyn PRN (has not taken since discharge); has not had wt gain.   - continue  Coreg 25/25  - continue Imdur 60 (increased from 30)  - started on Cardura 4 (may need to stop / adjust)  - follow up labs today (BMP, BNP, Mag)  - follow up with Cards on 5/21 (patient is requesting to see Dr. LYNN Burdick)        Recent admission for an MARYAM in the setting of CKD IV, with single Rt kidney:   *Baseline: 1.6-2.4  *Admit: 2.7  *Discharge: 3.5 (5/3), not at baseline; needs close monitoring   - continue Bicarb tabs  -amb ref to Neph (apt: today at 1300 with Dr. Velasco)  *Dr. Velasco   - check Renal Panel      Hypertension:   *Goal: < 130/90  Today: 94/52 (May need med adjustments; checking labs also)  *Of note, hospital Range: /50-83  - continue Coreg 25/25 (not new dose)  - continue Procardia 90 (not new dose)  - continue Hydralazine 100/100 (not new dose)  *Check labs today       CAD:   (Stable)  - ASA  - Imdur  - Crestor        Hypothyroidism:   Lab Results   Component Value Date    TSH 0.885 04/02/2018    F1RWBYE 68 06/30/2017    E4OPXCJ 4.4 (L) 06/30/2017    FREET4 0.89 11/21/2016   - continue Synthroid        History of SLE:   - continue Plaquenil  - follow up with Rheum (Dr. Ramirez)        Priority Clinic Visit (Post Discharge Follow-up) Today:   - Our clinic physicians and nurses plan to follow the patient up for any medical issues in the [facility] for 30 days post discharge.      Future Appointments  Date Time Provider Department [place]   5/7/2018 10:20 AM LAB, APPOINTMENT [facility] INTMED [facility] LAB  Markus Yung PCW   5/11/2018 9:00 AM Bryn Sierra MD [facility] OPHTHAL Markus Yung   5/16/2018 7:00 AM LAB, [facility] LAB [facility]   5/21/2018 8:00 AM Yeimi Burdick MD [facility] HEARTTX Markus Yung   5/22/2018 10:00 AM Kalie Walton MD [facility]   5/23/2018 10:20 AM Tanner Guzmán MD [facility] NEPHCHRISTIANO Yung   6/27/2018 8:15 AM LAB, [facility] LAB [facility]   8/21/2018 10:00 AM Marky Mccray MD [facility] PERLITA Yung          Medication List           Accurate as of 5/7/18  9:54 AM. If you have any questions, ask your nurse or doctor.               CONTINUE taking these medications    aspirin 81 MG EC tablet  Commonly known as:  ECOTRIN  Take 1 tablet (81 mg total) by mouth once daily.     butalbital-acetaminophen-caffeine -40 mg -40 mg per tablet  Commonly known as:  FIORICET, ESGIC  Take 1 tablet by mouth every 6 (six) hours as needed.     carvedilol 25 MG tablet  Commonly known as:  COREG  Take 1 tablet (25 mg total) by mouth 2 (two) times daily.     citalopram 20 MG tablet  Commonly known as:  CELEXA  Take 1.5 tablets (30 mg total) by mouth once daily.     doxazosin 4 MG tablet  Commonly known as:  CARDURA  Take 1 tablet (4 mg total) by mouth once daily.     famotidine 20 MG tablet  Commonly known as:  PEPCID  Take 1 tablet (20 mg total) by mouth once daily.     furosemide 80 MG tablet  Commonly known as:  LASIX  Take 1 tablet (80 mg total) by mouth 2 (two) times daily.     hydrALAZINE 100 MG tablet  Commonly known as:  APRESOLINE  Take 1 tablet (100 mg total) by mouth every 12 (twelve) hours.     hydroxychloroquine 200 mg tablet  Commonly known as:  PLAQUENIL  TAKE 1 TABLET TWICE DAILY     isosorbide mononitrate 60 MG 24 hr tablet  Commonly known as:  IMDUR  Take 1 tablet (60 mg total) by mouth once daily.     latanoprost 0.005 % ophthalmic solution  INSTILL 1 DROP INTO BOTH EYES EVERY DAY     levothyroxine 75 MCG tablet  Commonly known as:  SYNTHROID  Take 1 tablet (75 mcg total) by mouth before breakfast.     metOLazone 2.5 MG tablet  Commonly known as:  ZAROXOLYN  Take 2 tablets (5 mg total) by mouth daily as needed (Weigt gain >2Lbs.).     NIFEdipine 60 MG (OSM) 24 hr tablet  Commonly known as:  PROCARDIA-XL  Take 1 tablet (60 mg total) by mouth 2 (two) times daily.     rosuvastatin 10 MG tablet  Commonly known as:  CRESTOR  Take 1 tablet (10 mg total) by mouth every evening.     sodium bicarbonate 650 MG tablet  Take 2 tablets (1,300 mg  total) by mouth 2 (two) times daily.     VITAMIN D ORAL          Signing Physician:  GERALDINE Negrete

## 2018-05-07 NOTE — LETTER
May 7, 2018    Ewelina Arnold  [address] [address]             [facility] [address] [place] [address] Dear Ms Arnold:    Enclosed is the products we discussed.  The App47 meats, Adyuka Creole seasoning and the BubRatioes pickles.    You will still need to watch the sodium content of the product as it relates to serving size.    Each product may have different sodium content.  Read all product labels carefully and review the sodium content.  We recommend that you do not exceed 1500 to 2000 mg total daily.    Most fruits are natural diuretics so they are always a good choice to incorporate into daily meal plan.    Sincerely,  Nimo Mercado RN,[facility]  Outpatient Complex Case Management  [phone]

## 2018-05-07 NOTE — TELEPHONE ENCOUNTER
Patient was seen in PC today, per apt scheduled with Inpatient Case Management team for hospital follow up.   Post PC visit, was informed that patient was scheduled to establish care with Dr. Johns, but cancelled her apt. Given her HIGH risk for re-admission (has had multiple admissions in the recent past for various different medical reasons, in relation to ) and the circumvent fragmented care, she has (per documentation) verbally agreed to est care and keep the apt with Dr. Johns on 2018.   Additionally, upon review of labs received today:    BNP: 2403 <<< 3692    BMP:  Lab Results   Component Value Date     (L) 2018    K 3.5 2018    CL 92 (L) 2018    CO2 30 (H) 2018    BUN 58 (H) 2018    CREATININE 3.5 (H) 2018    CALCIUM 8.2 (L) 2018    ANIONGAP 9 2018    ESTGFRAFRICA 14 (A) 2018    EGFRNONAA 12 (A) 2018     Serum M.6    Collaborated on case  with Dr. Johns since she is scheduled to see him in 1 week.       RENE

## 2018-05-07 NOTE — Clinical Note
Priority Clinic Visit (Post Discharge Follow-up) Today:  - Our clinic physicians and nurses plan to follow the patient up for any medical issues in the Priority Clinic for 30 days post discharge.  Future Appointments: 5/7/2018   10:20 AM   LAB, APPOINTMENT [facility] INT* [facility] LAB IM    Markus krishna PCW  5/11/2018  9:00 AM    Bryn Sierra MD    [facility] OPHTHAL   Markus krishna 5/16/2018  7:00 AM    LAB, [facility] LAB       [facility] 5/21/2018  8:00 AM    Yeimi Burdick MD        [facility] HEARTTX   [facility] 5/22/2018  10:00 AM   [facility] 5/23/2018  10:20 AM   Tanner Guzmán MD      [facility] NEPHRO    Markus krishna 6/27/2018  8:15 AM    LAB, [facility] LAB       [facility] 8/21/2018  10:00 AM   Marky Mccray MD   [facility] OPHTHAL   [facility]

## 2018-05-08 ENCOUNTER — OUTPATIENT CASE MANAGEMENT (OUTPATIENT)
Dept: ADMINISTRATIVE | Facility: OTHER | Age: 73
End: 2018-05-08

## 2018-05-08 ENCOUNTER — TELEPHONE (OUTPATIENT)
Dept: FAMILY MEDICINE | Facility: CLINIC | Age: 73
End: 2018-05-08

## 2018-05-08 NOTE — TELEPHONE ENCOUNTER
Patient returning phone call, states she has a rash noted to her shoulder and neck area, patient states she has an appt scheduled tomorrow and will see Dr. Ruth at that time.

## 2018-05-08 NOTE — PROGRESS NOTES
5/8/18  Summary:  Chart reviewed in epic.  Noted documentation from Ivis Thomson in priority clinic.  She confirms pt consent to see dr. Johns for evaluation, close monitoring and care coordination between specialist.    On 5/8/18 430 pm received a call from DR. Gustabo Brizuela, Nephrologist.  He indicates that has been made aware of attempt to coordinate care of this very complex patient.  He states he is able to view notes of practitioners and labs on [facility] but he is not able to document in epic at this time.  I advised that we are attempting to coordinate care of all practitioners due to the complexity of pt medical condition.  Advised that patient will see Dr. Johns in [facility] clinic and she will take on the primary care role for now.  He states he will message myself and dr. johns his contact information via epic and can fax his notes to her for entry to epic for review.    Interventions  hbp  Pt bp in clinic today was 94/52.  Pt states she feels ok.  Advised pt that eventhough her blood pressure is low she could still have excess fluid in lung, abd or peripheral areas that can create the pulmonary edema and hypertension crisis she is having.  Discussed this process at length.  States no fluid in legs today.  Advised on what chf is as below.    chf pt was seen in the priority clinic today.  Labs ordered and are processing.  Pt wt 114 bp 94/52.  Pt is scheduled to see dr. Burdick.  Advised patient that her chf and hypertension are related but only treating hypertension will not cure or prevent the chf.  This is a multifaceted problem that will need attention of Nephrology, cardiology and internal medicine for optimal care.  Advised that in the [facility] clinic with dr. WENDY Johns she will be able to coordinate with the mentioned specialist and advise accordingly.  Advised that dr. johns has  Already started the coordination process by contacting dr. capellan (interventional  cardiology), Dr. Burdick (transplant/heart failure clinic) and discussed your case.  She plans to contact nephrology as well.  Pt consented to see dr. Chavira.   Message to dr. chavira advising and she states they will reach out to pt  Review chf diagnosis, signs and symptoms with pt and she verbalized understanding.  Reviewed importance of daily wt and advising md of 3 pound wt gain in a day or 5 pounds in a week and she verbalized understanding.  Advised to call dr. Burgess until she sees dr. chavira.  Reviewed low sodium diet and daily recommended amount of sodium.  Pt verbalized understanding.  Letter in mail along with examples of low sodium products as well as krames information on reading labels and low salt choices.    Plan  Chf  Follow up on appointment with dr. chavira and christianne  Follow up on communication with nephrology    HBP  Review low sodium diet and chf symptoms  Daily bp  Daily wt  Follow up on receipt of education material

## 2018-05-08 NOTE — TELEPHONE ENCOUNTER
----- Message from Doris Morales sent at 5/8/2018  9:04 AM CDT -----  Contact: self/[phone]  Pt is calling to speak with someone in the office in regards to getting a apt. She wants to know if  does allergy shots. She states that she has a rash all over her neck and shoulders and she really needs to see someone soon. Pt has an apt for tomorrow with they doctor but states that she would really like to come in today.  Please advise.      Thanks

## 2018-05-09 ENCOUNTER — TELEPHONE (OUTPATIENT)
Dept: FAMILY MEDICINE | Facility: CLINIC | Age: 73
End: 2018-05-09

## 2018-05-09 ENCOUNTER — OFFICE VISIT (OUTPATIENT)
Dept: FAMILY MEDICINE | Facility: CLINIC | Age: 73
End: 2018-05-09
Payer: MEDICARE

## 2018-05-09 VITALS
BODY MASS INDEX: 19.58 KG/M2 | WEIGHT: 117.5 LBS | HEIGHT: 65 IN | SYSTOLIC BLOOD PRESSURE: 146 MMHG | TEMPERATURE: 98 F | RESPIRATION RATE: 16 BRPM | DIASTOLIC BLOOD PRESSURE: 52 MMHG

## 2018-05-09 DIAGNOSIS — L40.9 PSORIASIS: Primary | ICD-10-CM

## 2018-05-09 PROCEDURE — 99499 UNLISTED E&M SERVICE: CPT | Mod: S$PBB,,, | Performed by: FAMILY MEDICINE

## 2018-05-09 PROCEDURE — 3078F DIAST BP <80 MM HG: CPT | Mod: CPTII,S$GLB,, | Performed by: FAMILY MEDICINE

## 2018-05-09 PROCEDURE — 3077F SYST BP >= 140 MM HG: CPT | Mod: CPTII,S$GLB,, | Performed by: FAMILY MEDICINE

## 2018-05-09 PROCEDURE — 99213 OFFICE O/P EST LOW 20 MIN: CPT | Mod: S$GLB,,, | Performed by: FAMILY MEDICINE

## 2018-05-09 PROCEDURE — 99999 PR PBB SHADOW E&M-EST. PATIENT-LVL III: CPT | Mod: PBBFAC,,, | Performed by: FAMILY MEDICINE

## 2018-05-09 RX ORDER — CALCIPOTRIENE 0.05 MG/ML
1 SOLUTION TOPICAL 2 TIMES DAILY
Qty: 60 ML | Refills: 3 | Status: SHIPPED | OUTPATIENT
Start: 2018-05-09 | End: 2020-02-17

## 2018-05-09 NOTE — PROGRESS NOTES
Patient presents complaining of a rash over the last few days.  Patient has tried multiple over-the-counter remedies none seem to help.  Patient describes the rash as dry and pruritic  , patient his level of discomfort is described as 6, with 10 being the worst and 1 being the least.  Past Medical History:   Diagnosis Date    Acute hypoxemic respiratory failure 4/28/2018    Acute on chronic diastolic congestive heart failure 11/17/2017    Allergy     Anatomical narrow angle glaucoma     Anemia     States diagnosed about a month ago    Aortic atherosclerosis     Arthritis     Cataract     CHF (congestive heart failure)     Chronic kidney disease     Chronic sciatica of left side     Right lower back pain due to sciatica    Coronary artery disease     Depression     Dry eyes     GERD (gastroesophageal reflux disease)     History of colon cancer     Hyperaldosteronism     Hyperlipidemia     Hypertension     Hypothyroidism     Left ventricular diastolic dysfunction with preserved systolic function     Lupus (systemic lupus erythematosus) 8/17/2012    Malnutrition 5/16/2017    Osteoarthritis of cervical spine 8/17/2012    Osteopenia     PAD (peripheral artery disease)     FRAN (renal artery stenosis)      Social History     Social History    Marital status: Single     Spouse name: N/A    Number of children: N/A    Years of education: N/A     Occupational History     Retired      Social History Main Topics    Smoking status: Former Smoker     Packs/day: 1.50     Years: 30.00     Types: Cigarettes     Start date: 1955     Quit date: 3/13/1985    Smokeless tobacco: Never Used    Alcohol use No    Drug use: No    Sexual activity: Not Currently     Partners: Male     Birth control/ protection: Abstinence     Other Topics Concern    Not on file     Social History Narrative    No narrative on file     Review of systems: Negative except for the rash  Physical exam:  Gen. appearance: Patient  uncomfortable but in no acute distress  Vital signs: See nurses note dated 5-9-18  Skin: Dry plaque like rash at the nape of the neck and into the hairline, grayish scale  Vascular exam: Distal pulses palpable throughout with good capillary refill in all nail beds  Musculoskeletal exam: Mild osteoarthritic changes throughout  Impression: psoriasis at the nape of the neck into the hairline                      Renal issues                      High aldosterone                     Plan: Refer to the med card dated 05/09/2018   calcipotriene 0.005 % sclp soln 1 application, 2 times daily     Follow-up if no better after above treatment  Complete all medications if no better or acutely worse followup.

## 2018-05-09 NOTE — TELEPHONE ENCOUNTER
----- Message from Tran Wilkins sent at 5/9/2018 12:13 PM CDT -----  Contact: call pt at [phone]  Prescription fo calcipotriene 0.005 % sclp solnr  you gave patient this morning is too expensive she would like to have a new cheaper medication called into her [facility] [id number] - [place], [place] - [address] [facility] [address] [phone] (Phone)

## 2018-05-09 NOTE — PATIENT INSTRUCTIONS
Psoriasis  Psoriasis is an inflammatory condition that affects the skin and nails. You may have patches of thick, red skin (plaques) covered with silvery scales. These often appear on the elbows, knees, legs, lower back, and scalp.  The plaques itch and can be painful. People with this condition are more likely to have emotional stress and depression.  Psoriasis is not contagious. It cant spread to someone else who touches it. But it can be inherited. It is an autoimmune skin disease. This means that the immune system has an abnormal reaction. It treats healthy skin like it is a foreign substance. This causes skin cells to grow faster than normal and to stack up in raised red patches. Psoriasis is a long-term (chronic) disease. You will have flare-ups that come and go over time.  Smoking, sun exposure, and alcohol use may affect how often the psoriasis occurs and how long the flare-ups last.  There is no cure, but treatments can offer relief. Treatment can include topical creams, light therapy (phototherapy), and oral or injectable medicines.  Home care  · No specific diet is needed. Eat a healthy, well-balanced diet that includes fresh fruits and vegetables, whole grains, and lean meats. Psoriasis can increase your risk for diabetes and heart disease.  · Increasing omega-3 fatty acids in your diet can help improve dry skin. The best dietary sources are fatty fish (salmon, mackerel, lake trout, albacore tuna) or fish oil (such as cod liver oil). A great way to take fish oil is to add it to a juice, shake, or smoothie. Flaxseeds and flaxseed oil, canola oil, walnuts, soybean, and tofu are converted to omega-3 fatty acid in the body.  · Stay at a healthy weight. Overlapping skin folds can be a site for psoriasis plaques. If you are overweight, talk to your healthcare provider about a weight-loss program.  · Bathing daily can help remove scales and calm inflamed skin. Use lukewarm water and mild soaps that have  added oils, fats, and moisturizers. Avoid deodorants, antiperspirants, and antibacterial soaps. These have a drying effect. Many people find it helpful to soak in a tub with added bath oils, oatmeal, apple cider vinegar, or Epsom salts.  · After bathing, put on skin cream (or a skin oil for a stronger effect).  · Some exposure to UV rays from the sun can improve psoriasis. But too much sun can trigger an outbreak. It also raises your risk for skin cancer. Limit sun exposure and use sunscreen on healthy skin (at least 15 SPF).  · If you are prescribed medicine, take it as directed.  · Unless another steroid cream was prescribed, you may use over-the-counter hydrocortisone cream for a few weeks during symptom flare-ups.  · Stop smoking. If you are a long-time smoker, this can be hard. Think about joining a stop-smoking program.  · Tell your provider if your joints start to ache or get stiff.  · Tell your provider if you notice changes in your fingernails.  · Depression is more common among psoriasis patients. Get help if you notice changes in your mood.  Follow-up care  Follow up with your healthcare provider, or as advised.  When to seek medical advice  Call your healthcare provider right away if any of these occur:  · Skin pain gets worse  · Bleeding from the skin plaques that is hard to control  · Signs of skin infection (redness, increasing pain, swelling, pus)  · Fever of 1 degree, or higher, above your normal temperature, or as directed by your provider  Date Last Reviewed: 8/1/2016 © 2000-2017 [employer]. [address] [place] [address], [place] [address]. All rights reserved. This information is not intended as a substitute for professional medical care. Always follow your healthcare professional's instructions.        Managing Psoriasis     Take baths in warm water to help soften scales.   The success of your medical treatment depends on you. When your healthcare provider gives you a treatment plan,  ask when you should expect to see results. Then, follow your plan. If your treatment does not work in the expected time, let your healthcare provider know. Psoriasis is a common disease, and it can respond to many different treatments. It depends on the location, size, and symptoms each person experiences. Some treatments are simple (tar-based therapies or topical steroids). Other treatments are complex (new biologic medicines or light therapy). Your healthcare provider will need to personalize your treatment. Psoriasis will often get better with treatment. But it can get worse later if you stop treatment or if a new illness occurs. In most cases, you can get control of your psoriasis again. You will likely need to see your healthcare provider regularly about treatment options.  Psoriasis self-care  Follow these steps to help manage your symptoms:  · Take baths to help soften scales. Use warm water, not hot water. To avoid drying out your skin, limit each bath to about 15 minutes. Add bath oil or [place] salts.  · After you bathe, apply lotion right away, while your skin is damp. Dry skin can make symptoms worse.  · Use a scalp treatment as prescribed by your healthcare provider. There are different solutions and dosages based on your symptoms.   · Seek treatment right away for any illnesses or skin injuries because they can cause flare-ups.  · Manage your stress, and use relaxation techniques.  · Expose your psoriatic skin to sunlight for 5 minutes a day. But dont do this if you feel that sun exposure makes your psoriasis worse. Use sunscreen on the normal, unaffected skin, but avoid sunburns.  · Use over-the-counter hydrocortisone cream for itching to reduce scaling for active outbreaks. Ask your healthcare provider about long-term use.  · Stick with treatment that your healthcare provider has recommended for you, especially if it's controlling your psoriasis.  · Avoid abrasive cleansers, harsh detergents, and  household chemicals.  Getting good results  Now that you know more about psoriasis, the next step is up to you. Follow your healthcare provider's treatment plan and self-care routine. Doing so can help you control your symptoms. If your symptoms dont improve or they get worse, call your healthcare provider. Psoriasis cant be cured. But its symptoms can be managed.   Date Last Reviewed: 2/1/2017  © 3795-9076 [employer]. [address] [place] [address], [place] [address]. All rights reserved. This information is not intended as a substitute for professional medical care. Always follow your healthcare professional's instructions.        What Is Psoriasis?  Psoriasis is a chronic skin disease. Psoriasis can begin at any age. It is most common between ages 30 and 39 and also between ages 50 and 69. Psoriasis affects nearly equal numbers of men and women. In people with this disease, the skin grows too fast. Dead skin cells build up on the skins surface to form inflamed, thick, silvery scales called plaques. Sometimes people form many small lesions that can hurt or have pus in them. Psoriasis does not spread from person to person.  About your symptoms  Psoriasis plaques tend to form on the elbows, knees, scalp, navel, arms, legs, and the penis or vulva. They can be unsightly, painful, and itchy. Plaques on the joints can limit movement. On the fingernails or toenails, psoriasis can cause pitting, a change in nail color, and a change in nail shape. In some cases, psoriasis also causes symptoms that are like arthritis. Symptoms may come and go on their own. Factors such as stress, infection, and certain medications may cause flare-ups. If symptoms bother you, know that many effective medical treatments exist  that can help relieve symptoms for most people with psoriasis. Discuss your treatment options with your health care provider.  External medical treatments  There are many types of external medical treatments  that are used to treat the affected skin lesions. Your health care provider may prescribe one of many types of topical medications. These include strong topical steroids or steroid-sparing agents. You apply them to your skin on a regular basis. Coal tar (a thick black liquid) may be applied to thick plaques. In some cases, the skin may be exposed to a special light in the health care provider's office. Or, you can expose the psoriatic plaques to short periods (5 minutes) of natural sun as directed by your health care provider. This method is called phototherapy. It can be enhanced with the use of psoralen (a type of medication).  Internal medical treatments  Internal treatments are taken orally (by mouth) or given by injection. There are a number of oral medications for more severe psoriasis. Your health care provider can tell you more about these treatments.  Date Last Reviewed: 5/10/2015  © 6054-2983 [employer]. [address] [place] [address]. All rights reserved. This information is not intended as a substitute for professional medical care. Always follow your healthcare professional's instructions.        Salicylic Acid shampoo  What is this medicine?  SALICYCLIC ACID (SAL i REAGAN ik AS id) breaks down layers of thick skin. It is used to treat dandruff, psoriasis, and seborrheic dermatitis.  How should I use this medicine?  This medicine is for external use only. Follow the directions on the label. Wet hair thoroughly, apply a liberal amount and massage into a rich lather. Allow lather to remain on scalp for several minutes. Rinse hair and repeat application. For best results use at least twice a week or as directed by a doctor. Keep away from eyes or inside mouth or nose. Use this medicine at regular intervals. Do not use more often than directed.  Talk to your pediatrician regarding the use of this medicine in children. Special care may be needed.  What side effects may I notice from  receiving this medicine?  Side effects that you should report to your doctor or health care professional as soon as possible:  · allergic reactions like skin rash, itching or hives, swelling of the face, lips, or tongue  Side effects that usually do not require medical attention (report to your doctor or health care professional if they continue or are bothersome):  · skin irritation  What may interact with this medicine?  Interactions are not expected. Do not use any other skin products without telling your doctor or health care professional.  What if I miss a dose?  If you miss a dose, use it as soon as you can. If it is almost time for your next dose, use only that dose. Do not use double or extra doses.  Where should I keep my medicine?  Keep out of the reach of children.  Store at room temperature between 15 and 30 degrees C (59 and 86 degrees F). Do not freeze. Throw away any unused medicine after the expiration date.  What should I tell my health care provider before I take this medicine?  They need to know if you have any of these conditions:  · kidney disease  · liver disease  · an unusual or allergic reaction to salicylic acid, other medicines, foods, dyes, or preservatives  · pregnant or trying to get pregnant  · breast-feeding  What should I watch for while using this medicine?  Tell your doctor or healthcare professional if your symptoms do not start to get better or if they get worse.  This medicine can make you more sensitive to the sun. Keep out of the sun. If you cannot avoid being in the sun, wear protective clothing and use sunscreen. Do not use sun lamps or tanning beds/booths.  NOTE:This sheet is a summary. It may not cover all possible information. If you have questions about this medicine, talk to your doctor, pharmacist, or health care provider. Copyright© 2017 [employer]

## 2018-05-09 NOTE — TELEPHONE ENCOUNTER
Spoke to the pharmacy anything she gets will be $99.00 or higher because she is in the coverage gap for her insurance company.

## 2018-05-10 ENCOUNTER — TELEPHONE (OUTPATIENT)
Dept: FAMILY MEDICINE | Facility: CLINIC | Age: 73
End: 2018-05-10

## 2018-05-10 DIAGNOSIS — L40.9 PSORIASIS: Primary | ICD-10-CM

## 2018-05-10 RX ORDER — MOMETASONE FUROATE 1 MG/G
OINTMENT TOPICAL DAILY
Qty: 45 G | Refills: 1 | Status: SHIPPED | OUTPATIENT
Start: 2018-05-10 | End: 2018-07-03 | Stop reason: SDUPTHER

## 2018-05-10 NOTE — TELEPHONE ENCOUNTER
----- Message from Diana Thomason sent at 5/10/2018  9:40 AM CDT -----  Contact: Pt [phone]  Patient is returning a phone call.  Who left a message for the patient: Savannah  Does patient know what this is regarding:  medications  Comments: pt states the medication is Mometasone Solution pt would like a call back

## 2018-05-10 NOTE — TELEPHONE ENCOUNTER
Spoke with patient states she contacted her insurance company who advised her that Mometasone Solution is an approved alternative for the calcipotriene 0.005 % sclp solnr.  Patient would like approved medication sent to the pharmacy

## 2018-05-11 ENCOUNTER — INITIAL CONSULT (OUTPATIENT)
Dept: OPHTHALMOLOGY | Facility: CLINIC | Age: 73
End: 2018-05-11
Payer: MEDICARE

## 2018-05-11 VITALS — SYSTOLIC BLOOD PRESSURE: 119 MMHG | DIASTOLIC BLOOD PRESSURE: 61 MMHG | HEART RATE: 62 BPM

## 2018-05-11 DIAGNOSIS — M32.9 SYSTEMIC LUPUS ERYTHEMATOSUS, UNSPECIFIED SLE TYPE, UNSPECIFIED ORGAN INVOLVEMENT STATUS: ICD-10-CM

## 2018-05-11 DIAGNOSIS — Z79.899 HIGH RISK MEDICATION USE: Primary | ICD-10-CM

## 2018-05-11 PROCEDURE — 92250 FUNDUS PHOTOGRAPHY W/I&R: CPT | Mod: S$GLB,,, | Performed by: OPHTHALMOLOGY

## 2018-05-11 PROCEDURE — 99999 PR PBB SHADOW E&M-EST. PATIENT-LVL II: CPT | Mod: PBBFAC,,, | Performed by: OPHTHALMOLOGY

## 2018-05-11 PROCEDURE — 92014 COMPRE OPH EXAM EST PT 1/>: CPT | Mod: S$GLB,,, | Performed by: OPHTHALMOLOGY

## 2018-05-11 PROCEDURE — 99499 UNLISTED E&M SERVICE: CPT | Mod: S$PBB,,, | Performed by: OPHTHALMOLOGY

## 2018-05-11 NOTE — LETTER
May 11, 2018      Marky Mccray MD  [address] [facility] [address]           [facility] - Ophthalmology  [address]  [facility] [address]  Phone: [phone]  Fax: [phone]          Patient: Ewelina Arnold   MR Number: [id number]   YOB: 1945   Date of Visit: 5/11/2018       Dear Dr. Marky Mccray:    Thank you for referring Ewelina Arnold to me for evaluation. Attached you will find relevant portions of my assessment and plan of care.    If you have questions, please do not hesitate to call me. I look forward to following Ewelina Arnold along with you.    Sincerely,    Bryn Sierra MD    Enclosure  CC:  No Recipients    If you would like to receive this communication electronically, please contact [email or web address] or [phone] to request more information on [employer] Link access.    For providers and/or their staff who would like to refer a patient to [facility], please contact us through our one-stop-shop provider referral line, [facility], at [phone].    If you feel you have received this communication in error or would no longer like to receive these types of communications, please e-mail [email or web address]

## 2018-05-11 NOTE — PROGRESS NOTES
HPI     DLS: 04/18/2018  Dr. Mccray    Pt states that when she closes eye in the day sees wavy white spots (not   flashes) OU and at night when they are closed see a black ring in both   eyes.  Otherwise vision good OU.  No pain.  Night time hallucinations have   stopped after d/c clonidine    Taking Plaquenil 400 PO QD- taking for about 10 yrs, although pt thinks   before 5 yrs ago dose may have been lower    (+) Floaters   (-)Pain, Flashes, Double Vision    Eye meds : Latanoprost QHS - OU                     Artificial tears QD - OU  a.m. Hour    Glaucoma  Lupus - pt on plaquenil            Last edited by Bryn Sierra MD on 5/11/2018 10:09 AM. (History)        [facility] SDOCT:   OD: good quality, good contour, IS/OS intact  OS: good quality, good contour, IS/OS intact  Stable    Fundus Autofluorescence  OD: No significant hyperautofluorescence or hypoautofluorescence  OS: No significant hyperautofluorescence or hypoautofluorescence  No change since 2016 and 2015 photos    Assessment /Plan     For exam results, see Encounter Report.    High risk medication use  -     Color Fundus Photography - OU - Both Eyes    Systemic lupus erythematosus, unspecified SLE type, unspecified organ involvement status      High risk dose without maculopathy today  Recommend follow with FAF and OCT q 6 months- pt seeing Dr Mccray q 6 months.    Please refer patient back if would like further retina evaluation, otherwise RTC with me PRN any changes or worsening

## 2018-05-14 ENCOUNTER — OUTPATIENT CASE MANAGEMENT (OUTPATIENT)
Dept: ADMINISTRATIVE | Facility: OTHER | Age: 73
End: 2018-05-14

## 2018-05-14 NOTE — PROGRESS NOTES
5/14/18  Summary:  Chart reviewed in epic.  Pt is scheduled to see dr. chavira on 5/18/18.  Pt states she is dong ok.  But she states she is waiting for nephrologist to call because she placed call due to swelling in feet.      Interventions  hbp  Blood pressure range 129/70 -145/80.  Pt states doing really good only 1 headache so far this week.      chf   Reviewed wt parameters pt states she has increased wt of 2 pound over the past couple of days and little swelling in leg.  She states she called her nephrologist to notify and he called her back and indicated that she is ok unless she gains another 2 pounds or so.  Advised pt on wt parameters again.  Also advised on plate method.  Pt admits to eating a little barbecue this week end and some red beans.  Reviewed diet with pt and again reviewed food plate method pt verbalized understanding.  Advised to increase fruit as well such as melons, cantaloupe etc.  Advised that tea and coffee with caffine could also help promote fluid loss.  But do not drink too much.  Advised maybe 1 cup in mid day to help diurese.      Plan  Chf  Follow up on appointment with dr. chavira and christianne  Follow up on communication with nephrology    HBP  Review low sodium diet and chf symptoms  Daily bp  Daily wt  Follow up on receipt of education material

## 2018-05-15 ENCOUNTER — LAB VISIT (OUTPATIENT)
Dept: LAB | Facility: HOSPITAL | Age: 73
End: 2018-05-15
Attending: FAMILY MEDICINE
Payer: MEDICARE

## 2018-05-15 DIAGNOSIS — M32.9 LUPUS (SYSTEMIC LUPUS ERYTHEMATOSUS): Chronic | ICD-10-CM

## 2018-05-15 DIAGNOSIS — Z00.00 ROUTINE GENERAL MEDICAL EXAMINATION AT A HEALTH CARE FACILITY: ICD-10-CM

## 2018-05-15 DIAGNOSIS — I1A.0 RESISTANT HYPERTENSION: ICD-10-CM

## 2018-05-15 LAB
ALBUMIN SERPL BCP-MCNC: 2.9 G/DL
ALP SERPL-CCNC: 80 U/L
ALT SERPL W/O P-5'-P-CCNC: 16 U/L
ANION GAP SERPL CALC-SCNC: 13 MMOL/L
AST SERPL-CCNC: 35 U/L
BASOPHILS # BLD AUTO: 0.01 K/UL
BASOPHILS NFR BLD: 0.3 %
BILIRUB SERPL-MCNC: 0.2 MG/DL
BUN SERPL-MCNC: 75 MG/DL
CALCIUM SERPL-MCNC: 8.5 MG/DL
CHLORIDE SERPL-SCNC: 96 MMOL/L
CHOLEST SERPL-MCNC: 128 MG/DL
CHOLEST/HDLC SERPL: 2.7 {RATIO}
CO2 SERPL-SCNC: 24 MMOL/L
CREAT SERPL-MCNC: 3.5 MG/DL
DIFFERENTIAL METHOD: ABNORMAL
EOSINOPHIL # BLD AUTO: 0 K/UL
EOSINOPHIL NFR BLD: 0 %
ERYTHROCYTE [DISTWIDTH] IN BLOOD BY AUTOMATED COUNT: 13.2 %
EST. GFR  (AFRICAN AMERICAN): 14.3 ML/MIN/1.73 M^2
EST. GFR  (NON AFRICAN AMERICAN): 12.4 ML/MIN/1.73 M^2
GLUCOSE SERPL-MCNC: 68 MG/DL
HCT VFR BLD AUTO: 24.9 %
HDLC SERPL-MCNC: 48 MG/DL
HDLC SERPL: 37.5 %
HGB BLD-MCNC: 8 G/DL
IMM GRANULOCYTES # BLD AUTO: 0 K/UL
IMM GRANULOCYTES NFR BLD AUTO: 0 %
LDLC SERPL CALC-MCNC: 64.2 MG/DL
LYMPHOCYTES # BLD AUTO: 1 K/UL
LYMPHOCYTES NFR BLD: 30.2 %
MCH RBC QN AUTO: 27.1 PG
MCHC RBC AUTO-ENTMCNC: 32.1 G/DL
MCV RBC AUTO: 84 FL
MONOCYTES # BLD AUTO: 0.6 K/UL
MONOCYTES NFR BLD: 16.3 %
NEUTROPHILS # BLD AUTO: 1.8 K/UL
NEUTROPHILS NFR BLD: 53.2 %
NONHDLC SERPL-MCNC: 80 MG/DL
NRBC BLD-RTO: 0 /100 WBC
PLATELET # BLD AUTO: 206 K/UL
PMV BLD AUTO: 10.4 FL
POTASSIUM SERPL-SCNC: 4.6 MMOL/L
PROT SERPL-MCNC: 6.7 G/DL
RBC # BLD AUTO: 2.95 M/UL
SODIUM SERPL-SCNC: 133 MMOL/L
TRIGL SERPL-MCNC: 79 MG/DL
TSH SERPL DL<=0.005 MIU/L-ACNC: 1.4 UIU/ML
WBC # BLD AUTO: 3.38 K/UL

## 2018-05-15 PROCEDURE — 85025 COMPLETE CBC W/AUTO DIFF WBC: CPT

## 2018-05-15 PROCEDURE — 80061 LIPID PANEL: CPT

## 2018-05-15 PROCEDURE — 36415 COLL VENOUS BLD VENIPUNCTURE: CPT | Mod: PO

## 2018-05-15 PROCEDURE — 84443 ASSAY THYROID STIM HORMONE: CPT

## 2018-05-15 PROCEDURE — 80053 COMPREHEN METABOLIC PANEL: CPT

## 2018-05-16 RX ORDER — HYDROXYCHLOROQUINE SULFATE 200 MG/1
TABLET, FILM COATED ORAL
Qty: 180 TABLET | Refills: 1 | Status: SHIPPED | OUTPATIENT
Start: 2018-05-16 | End: 2018-11-30 | Stop reason: SDUPTHER

## 2018-05-17 ENCOUNTER — TELEPHONE (OUTPATIENT)
Dept: FAMILY MEDICINE | Facility: CLINIC | Age: 73
End: 2018-05-17

## 2018-05-17 NOTE — TELEPHONE ENCOUNTER
I did not refer her to this doctor, I sent this doctor a message to call her to explain why she is seeing her but   I see no indication that any referal was made to this md  In reviewing the chart the pt spoke to a yulisa lindsey np and agreed to see this doctor so it was set up. She agreed to have this doctor to be her new pcp by phone 5-7-18 with this nurse

## 2018-05-17 NOTE — TELEPHONE ENCOUNTER
Spoke with patient states someone from Dr. Johns's office contacted her, stating that Dr. Ruth referred her to Dr. Johns's office as her new PCP since Dr. Johns deals with patient's with CHF.  Patient would like to know if you referred her to this new PCP.

## 2018-05-17 NOTE — TELEPHONE ENCOUNTER
----- Message from Jeimy Horan sent at 5/17/2018 10:19 AM CDT -----  Contact: [phone]  Pt would like a call back regarding her recent referral to a new PCP. Pt states she would like a call back regarding this matter soon because she has an appt tomorrow to be seen by new PCP.

## 2018-05-18 ENCOUNTER — TELEPHONE (OUTPATIENT)
Dept: INTERNAL MEDICINE | Facility: CLINIC | Age: 73
End: 2018-05-18

## 2018-05-18 ENCOUNTER — OFFICE VISIT (OUTPATIENT)
Dept: PRIMARY CARE CLINIC | Facility: CLINIC | Age: 73
End: 2018-05-18
Payer: MEDICARE

## 2018-05-18 VITALS
HEART RATE: 63 BPM | HEIGHT: 65 IN | SYSTOLIC BLOOD PRESSURE: 144 MMHG | BODY MASS INDEX: 18.69 KG/M2 | OXYGEN SATURATION: 99 % | DIASTOLIC BLOOD PRESSURE: 64 MMHG | WEIGHT: 112.19 LBS

## 2018-05-18 DIAGNOSIS — M32.8 OTHER FORMS OF SYSTEMIC LUPUS ERYTHEMATOSUS, UNSPECIFIED ORGAN INVOLVEMENT STATUS: Chronic | ICD-10-CM

## 2018-05-18 DIAGNOSIS — I51.89 LEFT VENTRICULAR DIASTOLIC DYSFUNCTION WITH PRESERVED SYSTOLIC FUNCTION: ICD-10-CM

## 2018-05-18 DIAGNOSIS — I35.0 NONRHEUMATIC AORTIC VALVE STENOSIS: ICD-10-CM

## 2018-05-18 DIAGNOSIS — N18.4 CKD (CHRONIC KIDNEY DISEASE), STAGE IV: ICD-10-CM

## 2018-05-18 DIAGNOSIS — Z79.899 POLYPHARMACY: ICD-10-CM

## 2018-05-18 DIAGNOSIS — D69.2 SENILE PURPURA: ICD-10-CM

## 2018-05-18 PROCEDURE — 99215 OFFICE O/P EST HI 40 MIN: CPT | Mod: S$GLB,,, | Performed by: INTERNAL MEDICINE

## 2018-05-18 PROCEDURE — 3077F SYST BP >= 140 MM HG: CPT | Mod: CPTII,S$GLB,, | Performed by: INTERNAL MEDICINE

## 2018-05-18 PROCEDURE — 3078F DIAST BP <80 MM HG: CPT | Mod: CPTII,S$GLB,, | Performed by: INTERNAL MEDICINE

## 2018-05-18 PROCEDURE — 99499 UNLISTED E&M SERVICE: CPT | Mod: S$GLB,,, | Performed by: INTERNAL MEDICINE

## 2018-05-18 NOTE — Clinical Note
Patient seen today in [facility]. She is willing to have her care streamlined by heart failure (Dr Burdick) and nephrology (Dr Epperson) now that she is no longer eligible for TAVR. She is compensated for her heart failure today, although BP slightly elevated.  I will work with primary care pharmacy for pill packing to improve compliance and continue close follow-up to avert frequent hospital admissions for heart failure and uncontrolled BP.  Thanks, WENDY Johns MD/MPH Internal Medicine [facility] [phone]

## 2018-05-18 NOTE — PATIENT INSTRUCTIONS
TODAY:  - Dr Burdick is cardiologist for heart failure  - Dr Epperson is nephrologist for kidney issues   + referral to Dr Epperson  - Dr Johns is primary care coordinator until cardiac issues are stable    - pill packing at [facility] pharmacy

## 2018-05-18 NOTE — ASSESSMENT & PLAN NOTE
Low EF, aortic stenosis on recent echo  · F/U heart failure clinic- Dr Burdick   Patient presenting with hypocalcemia. Unlikely due to tumor lysis given normal phosphorous, normal potassium. Pt with low Vitamin D level in the past.  - will check iPTH and vitamin D level  - will replete as needed

## 2018-05-18 NOTE — ASSESSMENT & PLAN NOTE
Complicated regimen with numerous cardiac and nephro meds, at risk for hospitalization due to med noncompliance  · Pill packing at pharmacy  · Close follow-up

## 2018-05-18 NOTE — TELEPHONE ENCOUNTER
Albertina- Please call patient and explain why she was referred to this clinic as follows:    She has numerous hospital admits this year for decompensated heart failure. This means she needs closer care coordination as an outpatient between her PCP and Cardiology. She was referred to this clinic by 2 groups: the case management team and the [facility] (the hospital discharge group). Her cardiologists are aware and will work with me to keep her heart failure better controlled so that she does not have to go back to the hospital. I'm also in communication with her nephrologist (kidney doctor)    She does not have to stay with me as a primary care provider for the rest of her life, but for the time being she needs frequent appointments and close management of her heart failure. Both things she will receive in this clinic.    Thanks,  WENDY Johns MD/MPH  Internal Medicine  [facility]  [phone]

## 2018-05-18 NOTE — PROGRESS NOTES
Patient, Ewelina Arnold (MRN #[id number]), presented with a recent estimated Glumerular Filtration Rate (eGFR) less than 15 consistent with the definition of chronic kidney disease stage 5 (ICD10 - N18.5).    eGFR if    Date Value Ref Range Status   05/15/2018 14.3 (A) >60 mL/min/1.73 m^2 Final       The patient's chronic kidney disease stage 5 was monitored, evaluated, addressed and/or treated. This addendum to the medical record is made on 05/18/2018.

## 2018-05-18 NOTE — ASSESSMENT & PLAN NOTE
Aortic regurg and stenosis on recent echo, not eligible for TAVR (will not improve aortic valve function)  · F/U heart failure

## 2018-05-18 NOTE — PROGRESS NOTES
Primary Care Provider Appointment- NEW PATIENT    Subjective:      Patient ID: Ewelina Arnold is a 72 y.o. female with CHF, HTN, lupus, MDD    Chief Complaint: Establish Care    New patient to this clinic, previously seen by Dr Walton. Was recommended to this clinic by Out-patient Case Management and [facility] due to frequent hospitalizations and decompensated heart failure. She will be managed temporarily in this clinic until her heart failure is compensated.    She was evaluated by interventional cardiology (Dr Ly) for TAVR due to aortic stenosis and regurgitation, but patient not eligible for replacement due to current good function of valve. She is being followed by Dr Burdick for heart failure, who has agreed to follow her closely in clinic.    Patient endorses confusion about her meds and difficulty with compliance. She is receiving pill packing with home health, but wants assistance in the future.     She is seeing Dr Brizuela for Nephrology, who has privileges at [facility]. She is also followed by Endocrinology for additional work-up for HTN. Has labs scheduled on hospital discharge in 6/2018.     Social History     Social History    Marital status: Single     Spouse name: N/A    Number of children: N/A    Years of education: N/A     Occupational History     Retired      Social History Main Topics    Smoking status: Former Smoker     Packs/day: 1.50     Years: 30.00     Types: Cigarettes     Start date: 1955     Quit date: 3/13/1985    Smokeless tobacco: Never Used    Alcohol use No    Drug use: No    Sexual activity: Not Currently     Partners: Male     Birth control/ protection: Abstinence     Other Topics Concern    Not on file     Social History Narrative    No narrative on file       Past Surgical History:   Procedure Laterality Date    CARDIAC CATHETERIZATION  07/27/2011    x1    CHOLECYSTECTOMY  1999    COLON SURGERY  2000 & 2003    partial removal    COLONOSCOPY N/A  4/14/2016    Procedure: COLONOSCOPY;  Surgeon: Jose Steen MD;  [facility]  Service: Endoscopy;  Laterality: N/A;  prep 2 days prior light meals only/clear liquid day before  and 4 dulcolax tabs (5 mgs) at noon    COLONOSCOPY N/A 9/14/2017    Procedure: COLONOSCOPY;  Surgeon: Jose Steen MD;  Location: [facility] ENDO ([address] [place] [address]);  Service: Endoscopy;  Laterality: N/A;    EYE SURGERY      HAND SURGERY Bilateral 1996 & 1997    due to carpal tunnel     HERNIA REPAIR      HYSTERECTOMY  1983    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    OOPHORECTOMY      removed one    Peripheral Iridotomy both eyes  1994    laser angle correction    THYROIDECTOMY, PARTIAL  2006    to remove two nodules and one-half thyroid       Past Medical History:   Diagnosis Date    Acute hypoxemic respiratory failure 4/28/2018    Acute on chronic diastolic congestive heart failure 11/17/2017    Allergy     Anatomical narrow angle glaucoma     Anemia     States diagnosed about a month ago    Aortic atherosclerosis     Arthritis     Cataract     CHF (congestive heart failure)     Chronic kidney disease     Chronic sciatica of left side     Right lower back pain due to sciatica    Coronary artery disease     Depression     Dry eyes     GERD (gastroesophageal reflux disease)     History of colon cancer     Hyperaldosteronism     Hyperlipidemia     Hypertension     Hypothyroidism     Left ventricular diastolic dysfunction with preserved systolic function     Lupus (systemic lupus erythematosus) 8/17/2012    Malnutrition 5/16/2017    Osteoarthritis of cervical spine 8/17/2012    Osteopenia     PAD (peripheral artery disease)     FRAN (renal artery stenosis)        Family History   Problem Relation Age of Onset    Heart attack Mother     Hypertension Mother     Heart disease Father     Hypertension Father     Diabetes Maternal Grandmother     Glaucoma Maternal Grandmother     Hypertension Sister  "    Kidney disease Brother     Kidney failure Brother         received donated kidney from sister    No Known Problems Daughter     Diabetes Son     Hypertension Brother     Diabetes Maternal Aunt     No Known Problems Maternal Grandfather     No Known Problems Paternal Grandmother     No Known Problems Paternal Grandfather     Acne Neg Hx     Eczema Neg Hx     Lupus Neg Hx     Psoriasis Neg Hx     Melanoma Neg Hx     Amblyopia Neg Hx     Blindness Neg Hx     Cataracts Neg Hx     Macular degeneration Neg Hx     Retinal detachment Neg Hx     Strabismus Neg Hx        Review of Systems   Constitutional: Negative for activity change and appetite change.   Respiratory: Negative for cough and shortness of breath.    Cardiovascular: Negative for leg swelling.   Genitourinary: Negative for difficulty urinating and dysuria.   Hematological: Bruises/bleeds easily.   Psychiatric/Behavioral: Positive for agitation. Negative for behavioral problems, confusion, decreased concentration and dysphoric mood.       Objective:   BP (!) 144/64 (BP Location: Left arm, Patient Position: Sitting, BP Method: Medium (Manual))   Pulse 63   Ht 5' 5" (1.651 m)   Wt 50.9 kg (112 lb 3.4 oz)   LMP  (LMP Unknown) Comment: pt had taken bp meds less than an hour ago  SpO2 99%   BMI 18.67 kg/m²     Physical Exam   Constitutional: She is oriented to person, place, and time. She appears well-developed and well-nourished.   HENT:   Head: Normocephalic and atraumatic.   Neck: Normal range of motion.   Neurological: She is alert and oriented to person, place, and time.   Skin:   Ecchymoses and purpura on UE bilaterally   Psychiatric: She has a normal mood and affect. Her behavior is normal. Thought content normal.   Vitals reviewed.        Lab Results   Component Value Date    WBC 3.38 (L) 05/15/2018    HGB 8.0 (L) 05/15/2018    HCT 24.9 (L) 05/15/2018     05/15/2018    CHOL 128 05/15/2018    TRIG 79 05/15/2018    HDL 48 " 05/15/2018    ALT 16 05/15/2018    AST 35 05/15/2018     (L) 05/15/2018    K 4.6 05/15/2018    CL 96 05/15/2018    CREATININE 3.5 (H) 05/15/2018    BUN 75 (H) 05/15/2018    CO2 24 05/15/2018    TSH 1.399 05/15/2018    INR 1.0 04/28/2018    GLUF 79 12/02/2011    HGBA1C 5.7 01/24/2017       Current Outpatient Prescriptions on File Prior to Visit   Medication Sig Dispense Refill    aspirin (ECOTRIN) 81 MG EC tablet Take 1 tablet (81 mg total) by mouth once daily. 30 tablet 0    butalbital-acetaminophen-caffeine -40 mg (FIORICET, ESGIC) -40 mg per tablet Take 1 tablet by mouth every 6 (six) hours as needed. 30 tablet 0    calcipotriene 0.005 % sclp soln Apply 1 application topically 2 (two) times daily. 60 mL 3    carvedilol (COREG) 25 MG tablet Take 1 tablet (25 mg total) by mouth 2 (two) times daily. 180 tablet 1    citalopram (CELEXA) 20 MG tablet Take 1.5 tablets (30 mg total) by mouth once daily. 135 tablet 1    ERGOCALCIFEROL, VITAMIN D2, (VITAMIN D ORAL) Take by mouth.      famotidine (PEPCID) 20 MG tablet Take 1 tablet (20 mg total) by mouth once daily. 90 tablet 1    furosemide (LASIX) 80 MG tablet Take 1 tablet (80 mg total) by mouth 2 (two) times daily. 60 tablet 1    hydrALAZINE (APRESOLINE) 100 MG tablet Take 1 tablet (100 mg total) by mouth every 12 (twelve) hours. 180 tablet 1    hydroxychloroquine (PLAQUENIL) 200 mg tablet TAKE 1 TABLET TWICE DAILY 180 tablet 1    isosorbide mononitrate (IMDUR) 60 MG 24 hr tablet Take 1 tablet (60 mg total) by mouth once daily. 30 tablet 1    latanoprost 0.005 % ophthalmic solution INSTILL 1 DROP INTO BOTH EYES EVERY DAY 3 Bottle 3    levothyroxine (SYNTHROID) 75 MCG tablet Take 1 tablet (75 mcg total) by mouth before breakfast. 30 tablet 0    metOLazone (ZAROXOLYN) 2.5 MG tablet Take 2 tablets (5 mg total) by mouth daily as needed (Weigt gain >2Lbs.). 60 tablet 11    mometasone (ELOCON) 0.1 % ointment Apply topically once daily. 45 g 1     NIFEdipine (PROCARDIA-XL) 60 MG (OSM) 24 hr tablet Take 1 tablet (60 mg total) by mouth 2 (two) times daily. 60 tablet 1    rosuvastatin (CRESTOR) 10 MG tablet Take 1 tablet (10 mg total) by mouth every evening. 90 tablet 3    sodium bicarbonate 650 MG tablet Take 2 tablets (1,300 mg total) by mouth 2 (two) times daily. 120 tablet 0     No current facility-administered medications on file prior to visit.          Assessment:   72 y.o. female with multiple co-morbid illnesses here to establish care with new PCP and continue work-up of chronic issues notably CHF, HTN, lupus, MDD    Plan:     Problem List Items Addressed This Visit        Cardiac/Vascular    Left ventricular diastolic dysfunction with preserved systolic function     Low EF, aortic stenosis on recent echo  · F/U heart failure clinic- Dr Burdick         Relevant Medications    pneumococcal vaccine (PNU-IMMUNE 23) 25 mcg/0.5 mL injection    Nonrheumatic aortic valve stenosis     Aortic regurg and stenosis on recent echo, not eligible for TAVR (will not improve aortic valve function)  · F/U heart failure            Renal/    CKD (chronic kidney disease), stage IV     Kidney donation history, 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  · F/U  [place]  · Monitor  · Avoid nephrotoxic meds         Relevant Orders    Ambulatory Referral to Nephrology       Immunology/Multi System    Other forms of systemic lupus erythematosus (Chronic)     Diagnosed in 1990s, pos CARYN, SSB  · Continue plaquenil  · monitor            Hematology    Senile purpura     Ecchymoses and purpura on UE bilaterally  · monitor            Other    Polypharmacy     Complicated regimen with numerous cardiac and nephro meds, at risk for hospitalization due to med noncompliance  · Pill packing at pharmacy  · Close follow-up               Health Maintenance       Date Due Completion Date    Pneumococcal (65+) (2 of 2 - PPSV23) 10/23/2016 10/23/2015- TODAY    Influenza Vaccine 08/01/2018 10/10/2017    Override on 10/9/2015:  Done    DEXA SCAN 01/05/2019 1/5/2016    High Dose Statin 05/11/2019 5/11/2018    Mammogram 08/23/2019 8/23/2017    Lipid Panel 05/15/2023 5/15/2018    TETANUS VACCINE 06/29/2026 6/29/2016    Colonoscopy 09/14/2027 9/14/2017          Follow-up in about 6 weeks (around 6/29/2018). One hour spent with this patient today, half of that in counseling.    Amarilys Johns MD/MPH  Internal Medicine  [facility]  [phone]

## 2018-05-21 ENCOUNTER — TELEPHONE (OUTPATIENT)
Dept: INTERNAL MEDICINE | Facility: CLINIC | Age: 73
End: 2018-05-21

## 2018-05-21 ENCOUNTER — INITIAL CONSULT (OUTPATIENT)
Dept: TRANSPLANT | Facility: CLINIC | Age: 73
End: 2018-05-21
Payer: MEDICARE

## 2018-05-21 VITALS
BODY MASS INDEX: 18.33 KG/M2 | HEART RATE: 59 BPM | DIASTOLIC BLOOD PRESSURE: 66 MMHG | HEIGHT: 65 IN | SYSTOLIC BLOOD PRESSURE: 138 MMHG | WEIGHT: 110 LBS

## 2018-05-21 DIAGNOSIS — I35.0 NONRHEUMATIC AORTIC VALVE STENOSIS: Primary | ICD-10-CM

## 2018-05-21 DIAGNOSIS — N17.9 ACUTE KIDNEY INJURY SUPERIMPOSED ON CHRONIC KIDNEY DISEASE: ICD-10-CM

## 2018-05-21 DIAGNOSIS — N18.4 CKD (CHRONIC KIDNEY DISEASE), STAGE IV: ICD-10-CM

## 2018-05-21 DIAGNOSIS — N18.9 ACUTE KIDNEY INJURY SUPERIMPOSED ON CHRONIC KIDNEY DISEASE: ICD-10-CM

## 2018-05-21 DIAGNOSIS — I1A.0 RESISTANT HYPERTENSION: ICD-10-CM

## 2018-05-21 PROCEDURE — 3078F DIAST BP <80 MM HG: CPT | Mod: CPTII,S$GLB,, | Performed by: INTERNAL MEDICINE

## 2018-05-21 PROCEDURE — 99999 PR PBB SHADOW E&M-EST. PATIENT-LVL III: CPT | Mod: PBBFAC,,, | Performed by: INTERNAL MEDICINE

## 2018-05-21 PROCEDURE — 99204 OFFICE O/P NEW MOD 45 MIN: CPT | Mod: S$GLB,,, | Performed by: INTERNAL MEDICINE

## 2018-05-21 PROCEDURE — 3075F SYST BP GE 130 - 139MM HG: CPT | Mod: CPTII,S$GLB,, | Performed by: INTERNAL MEDICINE

## 2018-05-21 PROCEDURE — 99499 UNLISTED E&M SERVICE: CPT | Mod: S$PBB,,, | Performed by: INTERNAL MEDICINE

## 2018-05-21 NOTE — PROGRESS NOTES
Adherence packed for 30 days:    Morning 1 card:  Asprin 81 mg  Carvedlol 25 mg  Citalopram 20 mg (1&1/2 tabs)  Famotidine 20 mg  Furosemide 80 mg  Hydralazine 100 mg  Hydroxychloroquine 200 mg  Isosorbide Mononitrate ER 60 mg  Levothyroxine 75 mcg  Nifedipine ER Osmotic 60 mg      Morning 2 card:  Sodium bicarb 650 mg (2 tabs)    Evening card:  Carvedilol 25 mg  Furosemide 80 mg  Hydralazine 100 mg  Hydroxychloroquine 200 mg  Nifedipine ER Osmotic 60 mg  Rosuvastatin 10 mg    Evening 2 card:  Sodium bicarb 650 mg (2 tabs)

## 2018-05-21 NOTE — PATIENT INSTRUCTIONS
Treating Diarrhea    Diarrhea happens when you have loose, watery, or frequent bowel movements. It is a common problem with many causes. Most cases of diarrhea clear up on their own. But certain cases may need treatment. Be sure to see your healthcare provider if your symptoms do not improve within a few days.  Getting relief  Treatment of diarrhea depends on its cause. Diarrhea caused by bacterial or parasite infection is often treated with antibiotics. Diarrhea caused by other factors, such as a stomach virus, often improves with simple home treatment. The tips below may also help relieve your symptoms.  · Suck on ice chips if drinking makes you queasy.  · Return to your normal diet slowly. You may want to eat bland foods at first, such as rice and toast. Also, you may need to avoid certain foods for a while, such as dairy products. These can make symptoms worse. Ask your healthcare provider if there are any other foods you should avoid.  · If you were prescribed antibiotics, take them as directed.  · Do not take anti-diarrhea medicines without asking your healthcare provider first.  Call your healthcare provider   Call your healthcare provider if you have any of the following:   · A fever of 100.4°F (38.0°C) or higher, or as directed by your healthcare provider  · Severe pain  · Worsening diarrhea or diarrhea for more than 2 days  · Bloody vomit or stool  · Signs of dehydration (dizziness, dry mouth and tongue, rapid pulse, dark urine)  Date Last Reviewed: 7/1/2016  © 2813-3129 [employer]. [address] [place] [address], [place] [address]. All rights reserved. This information is not intended as a substitute for professional medical care. Always follow your healthcare professional's instructions.

## 2018-05-21 NOTE — TELEPHONE ENCOUNTER
Albertina- Please let patient know that I wanted to confirm with cardiology about med changes before packign her pills. All of her pills are with pharmacy, and they are working on packing them now. They will call her when done.    Please also see if she'd be willing to do digital HTN if she ONLY receives phone calls from me (and not the pharmacists daily), and I'd only call her once weekly.    RICHAR- please pack patient's meds based on current med list, let me know if she needs any refills.    Thanks,  KJ

## 2018-05-21 NOTE — PROGRESS NOTES
"Subjective:    Patient ID:  Ewelina Arnold is a 72 y.o. female who presents for evaluation of Hypertension (New Consult from Dr. CHRISTIE Ly) and Aortic Stenosis      HPI  Moderate to severe aortic stenosis (PABLITO 0.94 / mean gradient 16 / PABLITO 3.27) who has increasing SOB over last 12 months.  Has been admitted > three times since beginning of 2018 presumably for diastolic CHF / HTN.    Today reports best BP control in a while.  Still get PEDRO after one block.  BP at 130 to 140 / 70 which is an improvement for her  Not interested in reentering into digital HTN program but still uses cough for monitoring (too many phone calls)    Review of Systems   Constitution: Negative for decreased appetite, weight gain and weight loss.   Cardiovascular: Negative for chest pain, dyspnea on exertion, leg swelling, near-syncope, orthopnea and palpitations.   Respiratory: Negative for cough and shortness of breath.    Musculoskeletal: Negative for myalgias.   Gastrointestinal: Negative for jaundice.        Objective:    Physical Exam   Constitutional: She appears well-developed and well-nourished. No distress.   /66   Pulse (!) 59   Ht 5' 5" (1.651 m)   Wt 49.9 kg (110 lb 0.2 oz)   LMP  (LMP Unknown) Comment: pt had taken bp meds less than an hour ago  BMI 18.31 kg/m²      HENT:   Head: Normocephalic and atraumatic. Head is without abrasion and without contusion.   Right Ear: External ear normal.   Left Ear: External ear normal.   Nose: Nose normal. No epistaxis.   Mouth/Throat: Oropharynx is clear and moist. Mucous membranes are not cyanotic.   Eyes: Conjunctivae and EOM are normal. Pupils are equal, round, and reactive to light.   Neck: Normal range of motion. Neck supple. No tracheal deviation present.   Cardiovascular: Normal rate, regular rhythm and normal pulses.  Exam reveals no gallop.    Murmur heard.   Harsh midsystolic murmur is present with a grade of 3/6  at the upper right sternal border radiating to the " neck  Pulmonary/Chest: Effort normal and breath sounds normal. No stridor. No respiratory distress. She has no wheezes.   Abdominal: Soft. Normal appearance, normal aorta and bowel sounds are normal. She exhibits no distension. There is no tenderness.   Musculoskeletal: She exhibits no edema or tenderness.   Neurological: She is alert. She has normal strength and normal reflexes. She exhibits normal muscle tone.   Skin: Skin is warm. No rash noted. No erythema.   Psychiatric: She has a normal mood and affect. Her speech is normal and behavior is normal. Judgment and thought content normal. Cognition and memory are normal.   no labs today       Assessment:       1. Nonrheumatic aortic valve stenosis    2. Resistant hypertension    3. Acute kidney injury superimposed on chronic kidney disease stage IV    4. 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  Orders Placed This Encounter    2D echo with color flow doppler

## 2018-05-21 NOTE — Clinical Note
Will get echo later this summer  U think she will Reconsider digital HTN program (maybe PCP can surpervise?) [place] me [place] [phone] if u want to talk

## 2018-05-21 NOTE — TELEPHONE ENCOUNTER
Please see if patient is willing to be considered for the digital hypertension program. Her heart failure doctor is recommending it, and I think she would benefit too. Her BP readings would be reported to a pharmacist who would help her manage her meds to get her BP under control. This would be another service checking on her regularly, to help her make sure one of her heart issues is managed appropriately.    Thanks,  KJ

## 2018-05-22 ENCOUNTER — OUTPATIENT CASE MANAGEMENT (OUTPATIENT)
Dept: ADMINISTRATIVE | Facility: OTHER | Age: 73
End: 2018-05-22

## 2018-05-22 ENCOUNTER — TELEPHONE (OUTPATIENT)
Dept: INTERNAL MEDICINE | Facility: CLINIC | Age: 73
End: 2018-05-22

## 2018-05-22 NOTE — TELEPHONE ENCOUNTER
Please ask this patient about her weight loss. Is she taking her diuretic every day? Did she  her pill packs from pharmacy?    Thanks,  KJ    ----- Message from Yeimi Burdick MD sent at 5/22/2018 12:52 PM CDT -----  Contact: pt  I think she is CHF  She can talk to her PCP if she is worried  ----- Message -----  From: Cindi Beasley  Sent: 5/22/2018  12:38 PM  To: Yeimi Burdick MD, Anne Marie Sanchez RN    This pt doesn't belong to any coordinator group at the moment    See below      cindi  ----- Message -----  From: Louise Harry  Sent: 5/22/2018  11:03 AM  To: [facility] Staff    Pt want to let the doctor know that she lost 6.4 pounds since Friday.    Thanks

## 2018-05-22 NOTE — PROGRESS NOTES
5/22/18  Summary:  Chart reviewed in epic. Pt saw dr. chavira recommended her to enter digital hypertension program with 1 phone call weekly per her and not the pharmacy.  Pt is considering.  Pt to dr. faust and he advised to Repeat echo in Aug 2018 - if velocity > 4 or mean pressure > 40 can proceed with TAVR.  Advises medical management for now.  Pt was advised to resume care with [facility] and pt has appointment with dr. baez as advised.        Interventions  hbp  Blood pressure range 129/70 -145/80.  Pt states blood pressure is good.  Diet recall for past week.  Pt states she has increased her fruit and vegetable intake.  She states fruit on hold with the exception of banana until diarrhea checks completely.  Last bm yesterday and was semi formed, no diarrhea today.  Pt states she is reading labels and watching for sodium content.  Pt was advised to limit sodium to 1500 mg daily .  Discussed dr. Templeton recommendation to enroll in the digital hypertension program with her as the primary with calls weekly and pt states she is considering.  Advised pt that the blood pressure are no longer transmitting even though she is taking with the cuff she purchased from [facility].    chf   Reviewed wt parameters pt states she has had diarrhea for past 3 days and she took imodium and stomach seems to be calming down.  States she is not sure but she ate some pineapple that did not agree with her stomach.  Wt is down to 107 from 112 last week.  No swelling in legs.  She was weak over the week end but is feeling better today.  Advised on symptoms of dehydration and advised to drink for electrolyte replacement such as gatorade or the like.  Pt denies any symptoms of chf  Pt states she has 2 weeks of medications in her dispensing system that the home health nurse prepared for her while she is waiting for the pharmacy to complete her packaged pills.      Plan  Chf    Follow up on wt trends  Continue to educate on chf  symptoms and ways to controll  Educate on wt change parameters      HBP  Review low sodium diet and early warning signs of chf and symptoms  Daily bp  Follow up and encourage sign up for digital hypertension program  Follow up on receipt of education material

## 2018-05-23 ENCOUNTER — PATIENT MESSAGE (OUTPATIENT)
Dept: ADMINISTRATIVE | Facility: OTHER | Age: 73
End: 2018-05-23

## 2018-05-23 ENCOUNTER — LAB VISIT (OUTPATIENT)
Dept: LAB | Facility: HOSPITAL | Age: 73
End: 2018-05-23
Attending: INTERNAL MEDICINE
Payer: MEDICARE

## 2018-05-23 ENCOUNTER — OFFICE VISIT (OUTPATIENT)
Dept: PRIMARY CARE CLINIC | Facility: CLINIC | Age: 73
End: 2018-05-23
Payer: MEDICARE

## 2018-05-23 VITALS
HEIGHT: 64 IN | SYSTOLIC BLOOD PRESSURE: 160 MMHG | DIASTOLIC BLOOD PRESSURE: 60 MMHG | WEIGHT: 108.69 LBS | HEART RATE: 63 BPM | BODY MASS INDEX: 18.56 KG/M2 | OXYGEN SATURATION: 97 %

## 2018-05-23 DIAGNOSIS — I1A.0 RESISTANT HYPERTENSION: ICD-10-CM

## 2018-05-23 DIAGNOSIS — R63.4 WEIGHT LOSS, ABNORMAL: ICD-10-CM

## 2018-05-23 DIAGNOSIS — Z79.899 POLYPHARMACY: ICD-10-CM

## 2018-05-23 DIAGNOSIS — I51.89 LEFT VENTRICULAR DIASTOLIC DYSFUNCTION WITH PRESERVED SYSTOLIC FUNCTION: ICD-10-CM

## 2018-05-23 LAB
ALBUMIN SERPL BCP-MCNC: 3.3 G/DL
ALP SERPL-CCNC: 79 U/L
ALT SERPL W/O P-5'-P-CCNC: 14 U/L
ANION GAP SERPL CALC-SCNC: 11 MMOL/L
AST SERPL-CCNC: 41 U/L
BASOPHILS # BLD AUTO: 0.01 K/UL
BASOPHILS NFR BLD: 0.3 %
BILIRUB SERPL-MCNC: 0.3 MG/DL
BNP SERPL-MCNC: 2097 PG/ML
BUN SERPL-MCNC: 69 MG/DL
CALCIUM SERPL-MCNC: 8.9 MG/DL
CHLORIDE SERPL-SCNC: 92 MMOL/L
CO2 SERPL-SCNC: 26 MMOL/L
CREAT SERPL-MCNC: 3.8 MG/DL
DIFFERENTIAL METHOD: ABNORMAL
EOSINOPHIL # BLD AUTO: 0 K/UL
EOSINOPHIL NFR BLD: 0.6 %
ERYTHROCYTE [DISTWIDTH] IN BLOOD BY AUTOMATED COUNT: 12.9 %
EST. GFR  (AFRICAN AMERICAN): 13 ML/MIN/1.73 M^2
EST. GFR  (NON AFRICAN AMERICAN): 11 ML/MIN/1.73 M^2
GLUCOSE SERPL-MCNC: 81 MG/DL
HCT VFR BLD AUTO: 29.2 %
HGB BLD-MCNC: 9.5 G/DL
LYMPHOCYTES # BLD AUTO: 0.8 K/UL
LYMPHOCYTES NFR BLD: 21.3 %
MAGNESIUM SERPL-MCNC: 1.8 MG/DL
MCH RBC QN AUTO: 26.7 PG
MCHC RBC AUTO-ENTMCNC: 32.5 G/DL
MCV RBC AUTO: 82 FL
MONOCYTES # BLD AUTO: 0.6 K/UL
MONOCYTES NFR BLD: 17.1 %
NEUTROPHILS # BLD AUTO: 2.2 K/UL
NEUTROPHILS NFR BLD: 60.7 %
PLATELET # BLD AUTO: 214 K/UL
PMV BLD AUTO: 10 FL
POTASSIUM SERPL-SCNC: 4.2 MMOL/L
PROT SERPL-MCNC: 7.4 G/DL
RBC # BLD AUTO: 3.56 M/UL
SODIUM SERPL-SCNC: 129 MMOL/L
WBC # BLD AUTO: 3.56 K/UL

## 2018-05-23 PROCEDURE — 36415 COLL VENOUS BLD VENIPUNCTURE: CPT

## 2018-05-23 PROCEDURE — 85025 COMPLETE CBC W/AUTO DIFF WBC: CPT

## 2018-05-23 PROCEDURE — 85060 BLOOD SMEAR INTERPRETATION: CPT | Mod: ,,, | Performed by: PATHOLOGY

## 2018-05-23 PROCEDURE — 3077F SYST BP >= 140 MM HG: CPT | Mod: CPTII,S$GLB,, | Performed by: INTERNAL MEDICINE

## 2018-05-23 PROCEDURE — 80053 COMPREHEN METABOLIC PANEL: CPT

## 2018-05-23 PROCEDURE — 3078F DIAST BP <80 MM HG: CPT | Mod: CPTII,S$GLB,, | Performed by: INTERNAL MEDICINE

## 2018-05-23 PROCEDURE — 83735 ASSAY OF MAGNESIUM: CPT

## 2018-05-23 PROCEDURE — 83880 ASSAY OF NATRIURETIC PEPTIDE: CPT

## 2018-05-23 PROCEDURE — 99215 OFFICE O/P EST HI 40 MIN: CPT | Mod: S$GLB,,, | Performed by: INTERNAL MEDICINE

## 2018-05-23 PROCEDURE — 99499 UNLISTED E&M SERVICE: CPT | Mod: S$GLB,,, | Performed by: INTERNAL MEDICINE

## 2018-05-23 NOTE — PROGRESS NOTES
Primary Care Provider Appointment- URGENT APPT    Subjective:      Patient ID: Ewelina Arnold is a 72 y.o. female with CHF, CAD, HTN    Chief Complaint: Weight Loss (unknown reason ) and Shortness of Breath (1 day )    Patient newly compliant with diuretics for heart failure. She has had monthly hospital admits for CHF exacerbation in 2018. She is not eligible for TAVR (it would NOT provide improvement in valve function from her baseline at this time).    She complains of 6.4# weight loss in one week while on diuretics and having diarrhea for past week.    She has had a 20# weight loss since 12/2017. She is UTD on age-appropriate CA screening. CTs of chest, abd, pelvis showed nothing acute in 2017. She had BM bx in 10/2017 with no major abnormalities.    Pills packed as follows:  Adherence packed for 30 days:     Morning 1 card:  Asprin 81 mg  Carvedlol 25 mg  Citalopram 20 mg (1&1/2 tabs)  Famotidine 20 mg  Furosemide 80 mg  Hydralazine 100 mg  Hydroxychloroquine 200 mg  Isosorbide Mononitrate ER 60 mg  Levothyroxine 75 mcg  Nifedipine ER Osmotic 60 mg        Morning 2 card:  Sodium bicarb 650 mg (2 tabs)     Evening card:  Carvedilol 25 mg  Furosemide 80 mg  Hydralazine 100 mg  Hydroxychloroquine 200 mg  Nifedipine ER Osmotic 60 mg  Rosuvastatin 10 mg     Evening 2 card:  Sodium bicarb 650 mg (2 tabs)    Past Surgical History:   Procedure Laterality Date    CARDIAC CATHETERIZATION  07/27/2011    x1    CHOLECYSTECTOMY  1999    COLON SURGERY  2000 & 2003    partial removal    COLONOSCOPY N/A 4/14/2016    Procedure: COLONOSCOPY;  Surgeon: Jose Steen MD;  Location: [facility] ([address] [place] [address]);  Service: Endoscopy;  Laterality: N/A;  prep 2 days prior light meals only/clear liquid day before  and 4 dulcolax tabs (5 mgs) at noon    COLONOSCOPY N/A 9/14/2017    Procedure: COLONOSCOPY;  Surgeon: Jose Steen MD;  Location: [facility] ([address] [place] [address]);  Service: Endoscopy;  Laterality: N/A;    EYE SURGERY      HAND SURGERY  "Bilateral 1996 & 1997    due to carpal tunnel     HERNIA REPAIR      HYSTERECTOMY  1983    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    OOPHORECTOMY      removed one    Peripheral Iridotomy both eyes  1994    laser angle correction    THYROIDECTOMY, PARTIAL  2006    to remove two nodules and one-half thyroid       Past Medical History:   Diagnosis Date    Acute hypoxemic respiratory failure 4/28/2018    Acute on chronic diastolic congestive heart failure 11/17/2017    Allergy     Anatomical narrow angle glaucoma     Anemia     States diagnosed about a month ago    Aortic atherosclerosis     Arthritis     Cataract     CHF (congestive heart failure)     Chronic kidney disease     Chronic sciatica of left side     Right lower back pain due to sciatica    Coronary artery disease     Depression     Dry eyes     GERD (gastroesophageal reflux disease)     History of colon cancer     Hyperaldosteronism     Hyperlipidemia     Hypertension     Hypothyroidism     Left ventricular diastolic dysfunction with preserved systolic function     Lupus (systemic lupus erythematosus) 8/17/2012    Malnutrition 5/16/2017    Osteoarthritis of cervical spine 8/17/2012    Osteopenia     PAD (peripheral artery disease)     FRAN (renal artery stenosis)        Review of Systems   Constitutional: Negative for activity change and appetite change.   Respiratory: Negative for cough and shortness of breath.    Cardiovascular: Negative for leg swelling.   Genitourinary: Negative for difficulty urinating and dysuria.   Neurological: Positive for dizziness and light-headedness.   Hematological: Bruises/bleeds easily.   Psychiatric/Behavioral: Positive for agitation. Negative for behavioral problems, confusion, decreased concentration and dysphoric mood.       Objective:   BP (!) 160/60 (BP Location: Right arm, Patient Position: Sitting, BP Method: Medium (Manual))   Pulse 63   Ht 5' 4" (1.626 m)   Wt 49.3 kg (108 lb 11 " oz)   LMP  (LMP Unknown) Comment: pt had taken bp meds less than an hour ago  SpO2 97%   BMI 18.66 kg/m²     Physical Exam   Constitutional: She is oriented to person, place, and time. She appears well-developed and well-nourished.   HENT:   Head: Normocephalic and atraumatic.   Neck: Normal range of motion.   Neurological: She is alert and oriented to person, place, and time.   Skin:   Ecchymoses and purpura on UE bilaterally   Psychiatric: She has a normal mood and affect. Her behavior is normal. Thought content normal.   Vitals reviewed.      Lab Results   Component Value Date    WBC 3.38 (L) 05/15/2018    HGB 8.0 (L) 05/15/2018    HCT 24.9 (L) 05/15/2018     05/15/2018    CHOL 128 05/15/2018    TRIG 79 05/15/2018    HDL 48 05/15/2018    ALT 16 05/15/2018    AST 35 05/15/2018     (L) 05/15/2018    K 4.6 05/15/2018    CL 96 05/15/2018    CREATININE 3.5 (H) 05/15/2018    BUN 75 (H) 05/15/2018    CO2 24 05/15/2018    TSH 1.399 05/15/2018    INR 1.0 04/28/2018    GLUF 79 12/02/2011    HGBA1C 5.7 01/24/2017             Assessment:   72 y.o. female with multiple co-morbid illnesses here to continue work-up of chronic issues notably CHF, CAD, HTN.     Plan:     Problem List Items Addressed This Visit        Cardiac/Vascular    Resistant hypertension     Newly compliant with BP and CHF meds  · Restart digital HTN program  · PCP to manage BP  · Decrease lasix to 40mg BID         Relevant Orders    Hypertension Digital Medicine (HDMP): Assign Onboarding Questionnaires (Completed)    Hypertension Digital Medicine (HDMP) Enrollment Order (Completed)    Comprehensive metabolic panel    CBC auto differential    Brain natriuretic peptide    Magnesium    Left ventricular diastolic dysfunction with preserved systolic function     Low EF, aortic stenosis on recent echo  · F/U heart failure clinic- Dr Burdick  · PCP to manage digital HTN program  · Re-enroll today         Relevant Orders    [facility] Assign Onboarding Questionnaires (Completed)    Hypertension Digital Medicine ([facility]) Enrollment Order (Completed)    Comprehensive metabolic panel    CBC auto differential    Brain natriuretic peptide    Magnesium       Endocrine    Weight loss, abnormal     UTD on age-appropriate CA screenings, BMbx neg, chest CT in 2017 with stable lung nodule (no f/u recommended), abd CT in 2017 with no acute changes. Currently with decompensated CHF and diarrhea  · Monitor  · Optimize heart failure         Relevant Orders    Pathologist Interpretation Differential       Other    Polypharmacy     Complicated regimen with numerous cardiac and nephro meds, at risk for hospitalization due to med noncompliance  · Pill packing at pharmacy  · Close follow-up               Health Maintenance       Date Due Completion Date    Influenza Vaccine 08/01/2018 10/10/2017    Override on 10/9/2015: Done    DEXA SCAN 01/05/2019 1/5/2016    High Dose Statin 05/21/2019 5/21/2018    Mammogram 08/23/2019 8/23/2017    Lipid Panel 05/15/2023 5/15/2018    TETANUS VACCINE 06/29/2026 6/29/2016    Colonoscopy 09/14/2027 9/14/2017          Follow-up if symptoms worsen or fail to improve. . One hour spent with this patient today, half of that in counseling.    Amarilys Johns MD/MPH  Internal Medicine  [facility]  [phone]

## 2018-05-23 NOTE — ASSESSMENT & PLAN NOTE
Low EF, aortic stenosis on recent echo  · F/U heart failure clinic- Dr Burdick  · PCP to manage digital HTN program  · Re-enroll today

## 2018-05-23 NOTE — ASSESSMENT & PLAN NOTE
Newly compliant with BP and CHF meds  · Restart digital HTN program  · PCP to manage BP  · Decrease lasix to 40mg BID

## 2018-05-23 NOTE — ASSESSMENT & PLAN NOTE
UTD on age-appropriate CA screenings, BMbx neg, chest CT in 2017 with stable lung nodule (no f/u recommended), abd CT in 2017 with no acute changes. Currently with decompensated CHF and diarrhea  · Monitor  · Optimize heart failure

## 2018-05-23 NOTE — PATIENT INSTRUCTIONS
TODAY:  - decrease lasix to 40mg (half tablet) once daily until diarrhea improves, then 40mg twice daily for the time being  - skip lasix if BP is less than 100/80 and call this office  - monitor weight closely  - re-enroll in digital HTN program at [facility], PCP will manage  -  pill packs from [facility]  - labs today  - call this office for any reason

## 2018-05-24 ENCOUNTER — TELEPHONE (OUTPATIENT)
Dept: INTERNAL MEDICINE | Facility: CLINIC | Age: 73
End: 2018-05-24

## 2018-05-24 ENCOUNTER — PATIENT MESSAGE (OUTPATIENT)
Dept: ADMINISTRATIVE | Facility: OTHER | Age: 73
End: 2018-05-24

## 2018-05-24 LAB
PATH REV BLD -IMP: NORMAL
PATH REV BLD -IMP: NORMAL

## 2018-05-24 NOTE — TELEPHONE ENCOUNTER
Please let patient know that her heart failure lab (BNP) was improved and so was her anemia. But her kidney function has gotten slightly worse.    I will reach out to her heart failure doctor and kidney doctor about any additional medication changes that we need to do.    Thanks,  WENDY

## 2018-05-24 NOTE — TELEPHONE ENCOUNTER
Spoke with pt pt understood but pt wants to know why is she loosing weight her weight today was 105.8lbs she said is the edema causing anything pt also asked why is she having vic diarrhea she said do you want to do a stool culture also she stated she slacked up on eating because of the diarrhea Please, advise

## 2018-05-24 NOTE — TELEPHONE ENCOUNTER
Please advise patient to skip her diuretic (lasix) completely today, and if she's low again tomorrow then she should skip it then too. She should continue blood pressure meds otherwise.    I am still in discussion with her cardiologist and renal doctors about bigger picture changes. I will keep her posted.    Thanks,  WENDY Scott MA 2 hours ago (10:12 AM)         Spoke with pt pt said this morning when she woke up her BP was 118/61 she had not eaten then or taken her meds she said she had to get back in the bed for a min pt felt light headed she said it was mild and a little weak then pt said she felt better so she took her meds and ate but she did not take the BP med she wants to know should she take it or not because she feels her BP is too low Please, advise

## 2018-05-24 NOTE — TELEPHONE ENCOUNTER
Spoke with pt pt said this morning when she woke up her BP was 118/61 she had not eaten then or taken her meds she said she had to get back in the bed for a min pt felt light headed she said it was mild and a little weak then pt said she felt better so she took her meds and ate but she did not take the BP med she wants to know should she take it or not because she feels her BP is too low Please, advise

## 2018-05-25 ENCOUNTER — TELEPHONE (OUTPATIENT)
Dept: INTERNAL MEDICINE | Facility: CLINIC | Age: 73
End: 2018-05-25

## 2018-05-25 ENCOUNTER — OFFICE VISIT (OUTPATIENT)
Dept: NEPHROLOGY | Facility: CLINIC | Age: 73
End: 2018-05-25
Payer: MEDICARE

## 2018-05-25 VITALS
HEART RATE: 63 BPM | OXYGEN SATURATION: 98 % | DIASTOLIC BLOOD PRESSURE: 70 MMHG | HEIGHT: 64 IN | SYSTOLIC BLOOD PRESSURE: 140 MMHG | BODY MASS INDEX: 18.48 KG/M2 | WEIGHT: 108.25 LBS

## 2018-05-25 DIAGNOSIS — E26.9 HYPERALDOSTERONISM: ICD-10-CM

## 2018-05-25 DIAGNOSIS — N17.9 ACUTE KIDNEY INJURY (NONTRAUMATIC): ICD-10-CM

## 2018-05-25 DIAGNOSIS — I15.2 HYPERTENSION DUE TO ENDOCRINE DISORDER: ICD-10-CM

## 2018-05-25 DIAGNOSIS — N18.4 CKD (CHRONIC KIDNEY DISEASE), STAGE IV: Primary | ICD-10-CM

## 2018-05-25 PROCEDURE — 99499 UNLISTED E&M SERVICE: CPT | Mod: S$PBB,,, | Performed by: INTERNAL MEDICINE

## 2018-05-25 PROCEDURE — 99214 OFFICE O/P EST MOD 30 MIN: CPT | Mod: S$GLB,,, | Performed by: INTERNAL MEDICINE

## 2018-05-25 PROCEDURE — 3077F SYST BP >= 140 MM HG: CPT | Mod: CPTII,S$GLB,, | Performed by: INTERNAL MEDICINE

## 2018-05-25 PROCEDURE — 99999 PR PBB SHADOW E&M-EST. PATIENT-LVL III: CPT | Mod: PBBFAC,,, | Performed by: INTERNAL MEDICINE

## 2018-05-25 PROCEDURE — 3078F DIAST BP <80 MM HG: CPT | Mod: CPTII,S$GLB,, | Performed by: INTERNAL MEDICINE

## 2018-05-25 NOTE — TELEPHONE ENCOUNTER
She will see nephrology today and they may make management changes. Can you call her this afternoon to see if she continues to have diarrhea?    Thanks,  KJ

## 2018-05-25 NOTE — TELEPHONE ENCOUNTER
Spoke with pt pt will keep her apt today pt said call her she would like to speak with you also pt said her diarrhea was better today [phone]

## 2018-05-25 NOTE — PATIENT INSTRUCTIONS
Hold furosemide (lasix) until you get back to your dry weight of ~113-115lbs.    Hold hydralazine for now, if blood pressure increases, take half a tablet (50mg) twice a day.   Encourage oral and fluid intake for now, if diarrhea returns, call clinic or go to Emergency Room.

## 2018-05-25 NOTE — TELEPHONE ENCOUNTER
Please advise patietn that her appointment with Dr Epperson today is very important. She should pack an overnight bag in the event that he wants to admit her to the hospital for her symptoms and worsening kidney function.    She should continue to avoid lasix until she sees Dr Epperson today at 2:40pm. Please let me know if she wants to talk by phone at any point.    Thanks,  KJ

## 2018-05-28 ENCOUNTER — OUTPATIENT CASE MANAGEMENT (OUTPATIENT)
Dept: ADMINISTRATIVE | Facility: OTHER | Age: 73
End: 2018-05-28

## 2018-05-28 NOTE — PROGRESS NOTES
5/28/18  Summary:  Chart reviewed in [facility]. Pt saw dr. chavira for the continued diarrhea.  And medication adjustments were made.  Pt to nephrology today and advised as follows.Hold furosemide (lasix) until you get back to your dry weight of ~113-115lbs.    Hold hydralazine for now, if blood pressure increases, take half a tablet (50mg) twice a day.   Encourage oral and fluid intake for now, if diarrhea returns, call clinic or go to Emergency Room.  Call to pt today and she states she followed md instructions and the diarrhea has checked.  She is feeling much better      Interventions  hbp  Blood pressure range today was 110/70.  Pt states she signed up for the digital medicine again  Diet recall she is taking it easy.  Toast coffee, chicken etc.    chf   Reviewed wt parameters pt was advised to hold lasix until she reaches her dry wt of 113 - 115.  Advised pt to start with the lower end as she admits to lower extremity edema at the 115 wt.  States wt is 110.2. No swelling to lower extremities   Reviewed wt change parameters to notify md.  Pt verbalized understanding.  Advised pt to assess lower extremities and abdomen for development of edema and document on wt chart  Advised dr. Templeton if see any edema.  Long discussion about dry wt and what it represents.  Pt verbalized understanding.  Advised I would call her in 3 weeks.  Pt verbalized understanding.  Changed to episodic    Plan  Chf    Follow up on wt trends  Continue to educate on chf symptoms and ways to controll  Educate on wt change parameters      HBP  Review low sodium diet and early warning signs of chf and symptoms  Daily bp  Follow up and encourage sign up for digital hypertension program  Follow up on receipt of education material

## 2018-05-29 ENCOUNTER — PATIENT MESSAGE (OUTPATIENT)
Dept: PRIMARY CARE CLINIC | Facility: CLINIC | Age: 73
End: 2018-05-29

## 2018-05-29 ENCOUNTER — PATIENT MESSAGE (OUTPATIENT)
Dept: NEPHROLOGY | Facility: CLINIC | Age: 73
End: 2018-05-29

## 2018-05-31 NOTE — PROGRESS NOTES
Subjective:       Patient ID: Ewelina Arnold is a 72 y.o. Black or  female who presents for re-evaluation of progression of Chronic Kidney Disease    HPI    Ms. 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  Patient recently readmitted for elevated blood pressure, reports blood pressure improved and relatively stable since discharge, actually low in am recently.  She reports diarrhea for the past few days, weight down to 108kg, usually above 113kg, otherwise denies any fever, chest pain, shortness of breath, abdominal pain, dysuria/hematuria.     Review of Systems   Constitutional: Negative for appetite change, fatigue and fever.   Respiratory: Negative for chest tightness and shortness of breath.    Cardiovascular: Negative for chest pain and leg swelling.   Gastrointestinal: Positive for diarrhea. Negative for abdominal pain, constipation, nausea and vomiting.   Genitourinary: Negative for difficulty urinating, dysuria, flank pain, frequency, hematuria and urgency.   Musculoskeletal: Negative for arthralgias, joint swelling and myalgias.   Skin: Negative for rash and wound.   Neurological: Negative for dizziness, weakness and light-headedness.   All other systems reviewed and are negative.      Objective:      Physical Exam   Constitutional: She appears well-developed and well-nourished.   Cardiovascular: Normal rate, regular rhythm and normal heart sounds.  Exam reveals no gallop and no friction rub.    No murmur heard.  Pulmonary/Chest: Effort normal and breath sounds normal. No respiratory distress. She has no wheezes. She has no rales.   Abdominal: Soft. Bowel sounds are normal. There is no tenderness.   Musculoskeletal: She exhibits no edema.   Neurological: She is alert.   Skin: Skin  is warm and dry. No rash noted. No erythema.   Psychiatric: She has a normal mood and affect.       Assessment:       1. CKD (chronic kidney disease), stage IV    2. Acute kidney injury (nontraumatic)    3. Hypertension due to endocrine disorder    4. Hyperaldosteronism        Plan:     Ms. 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  Patient with acute kidney injury, likely due to moderate volume depletion. Given relatively hypotension and GI losses, will hold hydralazine and furosemide for now.  Will trend symptoms/creatinine closely with medication changes, will phone review BP diary and daily weights within a few days.  Encouraged oral/fluid intake for now, suspect dry weight ~113kg (will consider admitting patient if unable to maintain intake), further recommendations pending results of above, patient voiced understanding of above, agreeable to plan as noted.    - Anemia: Hgb below goal, will consider erythropoetin therapy once BP controlled  - Bone/mineral metabolism: will check PTH/VitD    Return to clinic in 4-6 weeks with renal/heme panel, iron/TIBC/ferritin, urinalysis/culture, urine protein/creatinine ratio, PTH/VitD prior to next visit

## 2018-06-04 ENCOUNTER — LAB VISIT (OUTPATIENT)
Dept: LAB | Facility: HOSPITAL | Age: 73
End: 2018-06-04
Attending: INTERNAL MEDICINE
Payer: MEDICARE

## 2018-06-04 DIAGNOSIS — N18.4 CKD (CHRONIC KIDNEY DISEASE), STAGE IV: ICD-10-CM

## 2018-06-04 LAB
25(OH)D3+25(OH)D2 SERPL-MCNC: 21 NG/ML
ALBUMIN SERPL BCP-MCNC: 2.9 G/DL
ANION GAP SERPL CALC-SCNC: 12 MMOL/L
BASOPHILS # BLD AUTO: 0.01 K/UL
BASOPHILS NFR BLD: 0.3 %
BUN SERPL-MCNC: 76 MG/DL
CALCIUM SERPL-MCNC: 8.3 MG/DL
CHLORIDE SERPL-SCNC: 104 MMOL/L
CO2 SERPL-SCNC: 22 MMOL/L
CREAT SERPL-MCNC: 3.4 MG/DL
DIFFERENTIAL METHOD: ABNORMAL
EOSINOPHIL # BLD AUTO: 0 K/UL
EOSINOPHIL NFR BLD: 0 %
ERYTHROCYTE [DISTWIDTH] IN BLOOD BY AUTOMATED COUNT: 13.1 %
EST. GFR  (AFRICAN AMERICAN): 14.8 ML/MIN/1.73 M^2
EST. GFR  (NON AFRICAN AMERICAN): 12.8 ML/MIN/1.73 M^2
FERRITIN SERPL-MCNC: 156 NG/ML
GLUCOSE SERPL-MCNC: 111 MG/DL
HCT VFR BLD AUTO: 25.7 %
HGB BLD-MCNC: 8.3 G/DL
IMM GRANULOCYTES # BLD AUTO: 0 K/UL
IMM GRANULOCYTES NFR BLD AUTO: 0 %
IRON SERPL-MCNC: 37 UG/DL
LYMPHOCYTES # BLD AUTO: 1 K/UL
LYMPHOCYTES NFR BLD: 28 %
MCH RBC QN AUTO: 27.3 PG
MCHC RBC AUTO-ENTMCNC: 32.3 G/DL
MCV RBC AUTO: 85 FL
MONOCYTES # BLD AUTO: 0.5 K/UL
MONOCYTES NFR BLD: 14.4 %
NEUTROPHILS # BLD AUTO: 2 K/UL
NEUTROPHILS NFR BLD: 57.3 %
NRBC BLD-RTO: 0 /100 WBC
PHOSPHATE SERPL-MCNC: 4.7 MG/DL
PLATELET # BLD AUTO: 240 K/UL
PMV BLD AUTO: 10.3 FL
POTASSIUM SERPL-SCNC: 4.1 MMOL/L
PTH-INTACT SERPL-MCNC: 296 PG/ML
RBC # BLD AUTO: 3.04 M/UL
SATURATED IRON: 13 %
SODIUM SERPL-SCNC: 138 MMOL/L
TOTAL IRON BINDING CAPACITY: 277 UG/DL
TRANSFERRIN SERPL-MCNC: 187 MG/DL
WBC # BLD AUTO: 3.54 K/UL

## 2018-06-04 PROCEDURE — 80069 RENAL FUNCTION PANEL: CPT

## 2018-06-04 PROCEDURE — 82728 ASSAY OF FERRITIN: CPT

## 2018-06-04 PROCEDURE — 36415 COLL VENOUS BLD VENIPUNCTURE: CPT | Mod: PO

## 2018-06-04 PROCEDURE — 83970 ASSAY OF PARATHORMONE: CPT

## 2018-06-04 PROCEDURE — 82306 VITAMIN D 25 HYDROXY: CPT

## 2018-06-04 PROCEDURE — 85025 COMPLETE CBC W/AUTO DIFF WBC: CPT

## 2018-06-04 PROCEDURE — 83540 ASSAY OF IRON: CPT

## 2018-06-06 NOTE — PROGRESS NOTES
Last 5 Patient Entered Readings                                      Current 30 Day Average: 181/83     Recent Readings 3/7/2018 3/7/2018 3/6/2018 3/6/2018 3/6/2018    SBP (mmHg) 206 207 210 204 199    DBP (mmHg) 92 94 92 94 88    Pulse 64 65 68 69 67        Patient's blood pressure has continued to rise. She is experiencing hallucinations with clonidine. Due to her high readings this morning, I am adding another tablet of clonidine to her morning medications today only. I started verapamil 240mg daily and increased labetalol to 600mg in the PM. She will be seen by her nephrologist on Friday. I will follow-up after this appointment.    4:24pm: Patient seen by Dr. Epperson who started eplerenone and informed the patient to not take verapamil. This is not reflected in her medcard. His notes are not complete from Dr. Epperson therefore I will follow-up tomorrow to see his complete note.     OT ACUTE Initial Evaluation                                                                                                                                                BASELINE STATUS COMPARED WITH CURRENT STATUS: Presents with ADL/IADL status near baseline , which was modified independent .    Patient lives in her apartment. She lives with her son.      ASSESSMENT:  Treatment today focused on OT evaluation including functional tasks, energy conservation and deep breathing education,  Monitor of oxygen saturation levels with activity.  ADL.   Current overall ADL status is supervision/set up grooming tasks while standing.  lower body dressing while seated on the bed and toileting tasks in the bathroom.  Modified independent due to general weakness.  .   Current functional mobility for IADL/ADL tasks is  supervision/set up for the following: toilet, bed, in room functional mobility with 2ww.  Supervision due to general safety.   Patient displays  good progress toward goals demonstrated by willingenss to work with therapy.      Limitations/Barriers at this time include weakness. Patient will benefit from further skilled OT for continued training  to help the patient meet goals as listed in functional data section below.     Patient is near her baseline at this time.   Patient will be appropriate to return to home once she is medically able.      Objective Measures Impacting Discharge Recommendation:   No formal objective measures completed this session    RECOMMENDATIONS FOR DISCHARGE:  Recommendations for Discharge: OT: Home (06/06/18 0959)  OT Identified Barriers to Discharge: none    Equipment:     PT/OT ADL Equipment for Discharge: none  (06/06/18 0959)    PLAN: Continue skilled OT  Treatment Plan for Next Session: bring pulse ox, patient 4 liters baseline. full ADL, monitor oxygen saturations levels as needed         Frequency Comments: M-F, (H), Loren    AM-PAC Outcomes -Daily Activity Domain  How much help  from another person does the patient currently need?*  Task Score  Norm   1. Putting on and taking off regular lower body clothing? 3 - A Little   2. Bathing (including washing, rinsing, drying)?   3 - A Little   3. Toileting, which includes using toilet, bedpan, or urinal? 3 - A Little   4. Putting on and taking off regular upper body clothing? 3 - A Little   5. Taking care of personal grooming such as brushing teeth? 3 - A Little   6. Eating meals?  3 - A Little   Raw Score: 18/24   Converted Score:  38.66 (Raw score = 18)   G code conversion CK: 40- 59% impairment (Raw score: 13-18)   Converted score >39.4 predictive of discharge to home  *Score based on clinical judgment/expected performance, may not have been performed during this session  Scoring Guidelines  1) Patient may use assistive devices unless otherwise indicated in18 question  2) Do not consider help for management of medical devices only (IV poles, catheters, NG, etc) as part of assist level  3) If patient requires device that substitutes for toileting or eating function (catheter, NG tube, PEG) they do not engage in these activities and are scored as Total  4) If activity was not observed and patient unable to do the activity, select \"Total\" .  If the patient can do the activity but was not directly observed, use profession judgment to determine how much assistance needed.    Task Modification: clinical decision making of low complexity, no task modification    Diagnosis:  1. Acute on chronic respiratory failure with hypoxia (CMS/HCC)    2. COPD with acute exacerbation (CMS/[facility])        Co-morbidities:   Patient Active Problem List   Diagnosis   • COPD (chronic obstructive pulmonary disease) (CMS/HCC)   • DJD (degenerative joint disease)   • Benign essential HTN   • Osteoporosis, unspecified   • Pure hypercholesterolemia   • Nontoxic uninodular goiter   • Herpes zoster   • Unspecified venous (peripheral) insufficiency   • Hypoxia   • Venous  hypertension, chronic, with ulcer and inflammation (CMS/HCC)   • Chronic renal insufficiency, stage III (moderate)   • Depression   • Hearing loss of both ears   • Hypothyroidism   • Respiratory failure, acute and chronic (CMS/HCC)   • Diastolic congestive heart failure (CMS/HCC)   • Non-rheumatic mitral regurgitation       Precautions:       Prior Living Situation:  Type of Home: Apartment (06/06/18 0953)  Lives With: Son (06/06/18 0953)    EDUCATION:   On this date, the patient was educated on energy conservation.    The response to education was: Verbalizes understanding                                                    SUBJECTIVE: Patient's Personal Goal: home  (06/06/18 0959)   Subjective: I want to get home  (06/06/18 0959)       OBJECTIVE:Basic Lines: O2 (06/06/18 0959)    RN reported Thacker Fall Scale Score: 65       Last 24 hours of Functional Data     ADLs  Self Cares/ADL's  Grooming Assistance: Supervision (06/06/18 0959)  Lower Body Clothing Assistance: Supervision (06/06/18 0959)  Toileting Assistance: Supervision (06/06/18 0959)  Self Cares/ADL's Comments #1: general weakness  (06/06/18 0959)    Household mobility  Household Mobility  Rolling: Supervision (06/06/18 0959)  Supine to Sit: Supervision (06/06/18 0959)  Sit to Supine: Supervision (06/06/18 0959)  Bed to Chair: Supervision (06/06/18 0959)  Sit to Stand: Supervision (06/06/18 0959)  Stand to Sit: Supervision (06/06/18 0959)  Toilet Transfers: Supervision (06/06/18 0959)  Transfer Equipment: 2ww, gait belt  (06/06/18 0959)  Sitting - Static: Supervision (06/06/18 0959)  Sitting - Dynamic: Supervision (06/06/18 0959)  Standing - Static: Supervision (06/06/18 0959)  Standing - Dynamic: Supervision (06/06/18 0959)  Household Mobility Comments #1: general weakness  (06/06/18 0959)    Home Management       Tolerance  OT Activity Tolerance  Activity Tolerance: 2:1 Activity to rest (06/06/18 0959)  Vital Signs: asymptomatic , 5 liters, 93 at rest,  89-91 w/ activity  (06/06/18 0959)  Activity Tolerance Comments: fair  (06/06/18 0959)    Vitals  Vital Signs: asymptomatic , 5 liters, 93 at rest, 89-91 w/ activity  (06/06/18 0959)    Cognition  Cognition Comments: baseline  (06/06/18 0959)    Communication/Cognition  Cognition Comments: baseline  (06/06/18 0959)    Patient's Personal Goal: home  (06/06/18 0959)    Therapy Goals  Goals  Short Term Goals to Be Reviewed On: 06/13/18 (06/06/18 0959)  Short Term Goals Are The Same as Discharge Goals: Yes (06/06/18 0959)  Goal Agreement: Patient agrees with goals and treatment plan (06/06/18 0959)  Grooming Discharge Goal 1: modified independent  (06/06/18 0959)  Bathing Discharge Goal 1: modified independent  (06/06/18 0959)  Dressing Discharge Goal 1: modified independent  (06/06/18 0959)  Toileting Discharge Goal 1: modified independent  (06/06/18 0959)  Home Setting Transfer Discharge Goal 1: modified independent  (06/06/18 0959)  Home Management Discharge Goal 1: modified independent  (06/06/18 0959)  Other Discharge Goal 1: modified independent  (06/06/18 0959)    Interventions and Treatment Time  Treatment Interventions: ADL retraining;Functional transfer training (06/06/18 0959)  OT Time Spent: 19 minutes (06/06/18 0959)      See OT flowsheet for full details regarding the OT therapy provided.

## 2018-06-11 ENCOUNTER — PATIENT OUTREACH (OUTPATIENT)
Dept: OTHER | Facility: OTHER | Age: 73
End: 2018-06-11

## 2018-06-11 NOTE — PROGRESS NOTES
Last 5 Patient Entered Readings                                      Current 30 Day Average: 158/74     Recent Readings 6/10/2018 6/9/2018 6/9/2018 6/9/2018 6/8/2018    SBP (mmHg) 147 144 129 171 187    DBP (mmHg) 93 67 64 77 78    Pulse 68 65 58 68 70        Patient's BP average is above goal of <130/80.     Patient denies s/s of hypotension (lightheadedness, dizziness, nausea, fatigue) associated with low readings. Instructed patient to inform me if this occurs, patient confirms understanding.      Patient denies s/s of hypertension (SOB, CP, severe headaches, changes in vision) associated with high readings. Instructed patient to go to the ED if BP > 180/110 and accompanied by hypertensive s/s, patient confirms understanding.    Will continue to monitor regularly. Will follow up in 2-3 weeks, sooner if BP begins to trend upward or downward.    Patient has my contact information and knows to call with any concerns or clinical changes.     Current HTN regimen:  Hypertension Medications             carvedilol (COREG) 25 MG tablet Take 1 tablet (25 mg total) by mouth 2 (two) times daily.    furosemide (LASIX) 80 MG tablet Take 1 tablet (80 mg total) by mouth 2 (two) times daily.    hydrALAZINE (APRESOLINE) 100 MG tablet Take 1 tablet (100 mg total) by mouth every 12 (twelve) hours.    isosorbide mononitrate (IMDUR) 60 MG 24 hr tablet Take 1 tablet (60 mg total) by mouth once daily.    metOLazone (ZAROXOLYN) 2.5 MG tablet Take 2 tablets (5 mg total) by mouth daily as needed (Weigt gain >2Lbs.).    NIFEdipine (PROCARDIA-XL) 60 MG (OSM) 24 hr tablet Take 1 tablet (60 mg total) by mouth 2 (two) times daily.      Patient's blood pressure is actively monitored and adjusted by Dr. Epperson and Dr. Johns. I will remain as a resource and will not be adjusting medications at this point. The patient is taking medications differently than above. I messaged Dr. Epperson and Dr. Johns to use me as a resource and to inform  me if I can help in anyway. The patient agreed to working with a health  for lifestyle modifications.

## 2018-06-12 ENCOUNTER — TELEPHONE (OUTPATIENT)
Dept: INTERNAL MEDICINE | Facility: CLINIC | Age: 73
End: 2018-06-12

## 2018-06-12 ENCOUNTER — OUTPATIENT CASE MANAGEMENT (OUTPATIENT)
Dept: ADMINISTRATIVE | Facility: OTHER | Age: 73
End: 2018-06-12

## 2018-06-12 DIAGNOSIS — Z12.31 BREAST CANCER SCREENING BY MAMMOGRAM: Primary | ICD-10-CM

## 2018-06-12 DIAGNOSIS — Z12.39 BREAST CANCER SCREENING: ICD-10-CM

## 2018-06-12 NOTE — TELEPHONE ENCOUNTER
Pt states she only takes lasix 40 mg when there's swelling and she will continue to take medication as directed.

## 2018-06-12 NOTE — TELEPHONE ENCOUNTER
Please let patient know that her mammogram was ordered, it is due after 8/23/17. Please schedule her at her convenience after that date.    Also ask her about her blood pressures. The digital HTN program let me know that her readings are elevated. Is she taking all of her meds as directed? Coreg 25mg BID, lasix 80mg BID (or verify her dose), hydralazine 100mg BID, nifedipine 60mg, imdur 60mg.    Thanks,  KJ    ----- Message from Albertina Scott MA sent at 6/11/2018  9:04 AM CDT -----  Contact: Self Call    [phone]  Please, advise   ----- Message -----  From: Kiah Crowe  Sent: 6/11/2018   9:01 AM  To: Nat Canseco Staff    Caller is requesting to schedule their annual screening mammogram appointment. Order is not listed in Epic.  Please enter order and contact patient to schedule.    Where would they like the mammogram performed?:  [facility]  Additional information:  Would like to scheduled a mammo also.

## 2018-06-12 NOTE — PROGRESS NOTES
6/12/18  Summary:  Chart reviewed in epic. Pt in communication with dr. chavira and dr. gaitan for blood pressure management.  Blood pressure digital monitoring in place and they are following up with pt accordingly    Interventions  hbp  Blood pressure range today was up a little several days ago but restarted apresoline  and now it is trending down  Reviewed medication protocol with pt and she is taking appropriately    chf pt states wt is up to 114 and she is feeling better.  Eating normal no more diarrhea at this behzad.  Pt states she has to run out couple we end conversation  Advised I will call her back in 3 weks and if no new problems we will close case at that time and pt verbalized understanding.  Re  Plan  Chf    Follow up on wt trends  Continue to educate on chf symptoms and ways to controll  Educate on wt change parameters      HBP  Review low sodium diet and early warning signs of chf and symptoms  Daily bp    Follow up on receipt of education material

## 2018-06-18 ENCOUNTER — TELEPHONE (OUTPATIENT)
Dept: OPHTHALMOLOGY | Facility: CLINIC | Age: 73
End: 2018-06-18

## 2018-06-18 ENCOUNTER — PATIENT OUTREACH (OUTPATIENT)
Dept: OTHER | Facility: OTHER | Age: 73
End: 2018-06-18

## 2018-06-18 ENCOUNTER — TELEPHONE (OUTPATIENT)
Dept: INTERNAL MEDICINE | Facility: CLINIC | Age: 73
End: 2018-06-18

## 2018-06-18 NOTE — PROGRESS NOTES
"Last 5 Patient Entered Readings                                      Current 30 Day Average: 158/73     Recent Readings 6/17/2018 6/16/2018 6/16/2018 6/15/2018 6/15/2018    SBP (mmHg) 162 164 130 113 125    DBP (mmHg) 75 84 63 47 65    Pulse 63 69 62 40 54        Encounter also consisted of discussing of patient's view of what she thinks being healthy means. She states wanting to "feel well."     Encounter also consisted of discussing of patient stating wanting to gain weight as she feels fraile and thin.     Digital Medicine: Health  Follow Up    Lifestyle Modifications:    1.Dietary Modifications (Sodium intake <2,000mg/day, food labels, dining out): Patient reports she is not following any diet but is trying to maintain a low-sodium diet. She states not eating not  She states consuming chicken often. She states making chicken soup in 3 day intervals. She states using fresh spices (e.g onion and carrots). She states using 1 breast for 3 days. She states she can "take or leave fish." She states making all meals at home. Patient reports consuming oatmeal . She states consuming beans and rice. She states not consuming deli meat. She states she does not snack on ice cream, chips, cookies, or nuts. She states she may snack on placido crackers. Discussed increasing her protein to help with weight gain. Discussed increasing her chicken consumption. Patient thinks she can do this. Will further assess dietary change upon next encounter.     2.Physical Activity: Patient expresses an interest to increase her physical activity. She states she would like to increase her walking but at a later point. Will further discuss increasing walking at next encounter.     3.Medication Therapy: Patient has been compliant with the medication regimen.    4.Patient has the following medication side effects/concerns:   (Frequency/Alleviating factors/Precipitating factors, etc.)     Follow up with Mrs. Ewelina Arnold completed. No further " questions or concerns. Will continue follow up to achieve health goals.

## 2018-06-18 NOTE — TELEPHONE ENCOUNTER
Updated eyeglass rx.  Louise Dawn is helping with pt's eyeglass order   Continue Regimen: Clindamycin 1% gel mixed with OTC Benzoyl Peroxide 2.5% QAM, Adapalene 0.3% gel QHS Detail Level: Zone Otc Regimen: Probiotics QD Discontinue Regimen: Minocycline due to dizziness and feeling disoriented Initiate Treatment: Doxycycline 100 mg PO BID

## 2018-06-18 NOTE — TELEPHONE ENCOUNTER
----- Message from Selam Carmichael sent at 6/15/2018  2:26 PM CDT -----  Contact: patient [phone]  Patient is scheduled to have an echo in August but would like to speak with you asap about scheduling this sooner. She will discuss the reason why when you return her call.

## 2018-06-18 NOTE — TELEPHONE ENCOUNTER
----- Message from Jenn Marinelli sent at 6/18/2018  9:30 AM CDT -----  Contact: Ewelina  Needs Advice    Reason for call:  Pt called to f/u about her Rx for eye glasses and current condition.  Communication Preference:[phone]  Additional Information:

## 2018-06-19 ENCOUNTER — OFFICE VISIT (OUTPATIENT)
Dept: DERMATOLOGY | Facility: CLINIC | Age: 73
End: 2018-06-19
Payer: MEDICARE

## 2018-06-19 DIAGNOSIS — R21 RASH AND NONSPECIFIC SKIN ERUPTION: Primary | ICD-10-CM

## 2018-06-19 DIAGNOSIS — L81.0 POST-INFLAMMATORY HYPERPIGMENTATION: ICD-10-CM

## 2018-06-19 PROCEDURE — 88305 TISSUE EXAM BY PATHOLOGIST: CPT | Performed by: PATHOLOGY

## 2018-06-19 PROCEDURE — 99999 PR PBB SHADOW E&M-EST. PATIENT-LVL II: CPT | Mod: PBBFAC,,, | Performed by: DERMATOLOGY

## 2018-06-19 PROCEDURE — 11100 PR BIOPSY OF SKIN LESION: CPT | Mod: S$GLB,,, | Performed by: DERMATOLOGY

## 2018-06-19 PROCEDURE — 99213 OFFICE O/P EST LOW 20 MIN: CPT | Mod: 25,S$GLB,, | Performed by: DERMATOLOGY

## 2018-06-19 NOTE — PROGRESS NOTES
Subjective:       Patient ID:  Ewelina Arnold is a 72 y.o. female who presents for   Chief Complaint   Patient presents with    Rash     arms, neck, chest, back     Rash  - Initial  Affected locations: back, chest, neck and left arm  Duration: 2 months  Signs / symptoms: itching and redness  Aggravated by: nothing  Relieving factors/Treatments tried: Rx topical steroids    73 yo female here for rash on chest, upper arms, back, and nose.  Has history of SLE on Plaquenil x decades, has not required other treatment for this.    The rash was not precipitated by any medication changes to her knowledge. She does take several cardiac medications including hydralazine and Norvasc.    Has been admitted several times this spring for cardiac related maladies.  Currently feeling well from that perspective but has resulted in stress.    Does not wear sun protection.      Review of Systems   Skin: Positive for itching and rash.   Hematologic/Lymphatic: Bruises/bleeds easily.        Objective:    Physical Exam   Constitutional: She appears well-developed and well-nourished. No distress.   Neurological: She is alert and oriented to person, place, and time. She is not disoriented.   Psychiatric: She has a normal mood and affect.   Skin:   Areas Examined (abnormalities noted in diagram):   Scalp / Hair Palpated and Inspected  Head / Face Inspection Performed  Neck Inspection Performed  Chest / Axilla Inspection Performed  Abdomen Inspection Performed  Genitals / Buttocks / Groin Inspection Performed  Back Inspection Performed              Diagram Legend     Erythematous scaling macule/papule c/w actinic keratosis       Vascular papule c/w angioma      Pigmented verrucoid papule/plaque c/w seborrheic keratosis      Yellow umbilicated papule c/w sebaceous hyperplasia      Irregularly shaped tan macule c/w lentigo     1-2 mm smooth white papules consistent with Milia      Movable subcutaneous cyst with punctum c/w epidermal inclusion  cyst      Subcutaneous movable cyst c/w pilar cyst      Firm pink to brown papule c/w dermatofibroma      Pedunculated fleshy papule(s) c/w skin tag(s)      Evenly pigmented macule c/w junctional nevus     Mildly variegated pigmented, slightly irregular-bordered macule c/w mildly atypical nevus      Flesh colored to evenly pigmented papule c/w intradermal nevus       Pink pearly papule/plaque c/w basal cell carcinoma      Erythematous hyperkeratotic cursted plaque c/w SCC      Surgical scar with no sign of skin cancer recurrence      Open and closed comedones      Inflammatory papules and pustules      Verrucoid papule consistent consistent with wart     Erythematous eczematous patches and plaques     Dystrophic onycholytic nail with subungual debris c/w onychomycosis     Umbilicated papule    Erythematous-base heme-crusted tan verrucoid plaque consistent with inflamed seborrheic keratosis     Erythematous Silvery Scaling Plaque c/w Psoriasis     See annotation    Lab Results   Component Value Date    CARYN Negative 05/31/2012         Left upper arm biopsy site    Lab Results   Component Value Date    WBC 3.54 (L) 06/04/2018    HGB 8.3 (L) 06/04/2018    HCT 25.7 (L) 06/04/2018    MCV 85 06/04/2018     06/04/2018     CMP  Sodium   Date Value Ref Range Status   06/04/2018 138 136 - 145 mmol/L Final     Potassium   Date Value Ref Range Status   06/04/2018 4.1 3.5 - 5.1 mmol/L Final     Chloride   Date Value Ref Range Status   06/04/2018 104 95 - 110 mmol/L Final     CO2   Date Value Ref Range Status   06/04/2018 22 (L) 23 - 29 mmol/L Final     Glucose   Date Value Ref Range Status   06/04/2018 111 (H) 70 - 110 mg/dL Final     BUN, Bld   Date Value Ref Range Status   06/04/2018 76 (H) 8 - 23 mg/dL Final     Creatinine   Date Value Ref Range Status   06/04/2018 3.4 (H) 0.5 - 1.4 mg/dL Final     Calcium   Date Value Ref Range Status   06/04/2018 8.3 (L) 8.7 - 10.5 mg/dL Final     Total Protein   Date Value Ref Range  Status   05/23/2018 7.4 6.0 - 8.4 g/dL Final     Albumin   Date Value Ref Range Status   06/04/2018 2.9 (L) 3.5 - 5.2 g/dL Final     Total Bilirubin   Date Value Ref Range Status   05/23/2018 0.3 0.1 - 1.0 mg/dL Final     Comment:     For infants and newborns, interpretation of results should be based  on gestational age, weight and in agreement with clinical  observations.  Premature Infant recommended reference ranges:  Up to 24 hours.............<8.0 mg/dL  Up to 48 hours............<12.0 mg/dL  3-5 days..................<15.0 mg/dL  6-29 days.................<15.0 mg/dL       Alkaline Phosphatase   Date Value Ref Range Status   05/23/2018 79 55 - 135 U/L Final     AST   Date Value Ref Range Status   05/23/2018 41 (H) 10 - 40 U/L Final     ALT   Date Value Ref Range Status   05/23/2018 14 10 - 44 U/L Final     Anion Gap   Date Value Ref Range Status   06/04/2018 12 8 - 16 mmol/L Final     eGFR if    Date Value Ref Range Status   06/04/2018 14.8 (A) >60 mL/min/1.73 m^2 Final     eGFR if non    Date Value Ref Range Status   06/04/2018 12.8 (A) >60 mL/min/1.73 m^2 Final     Comment:     Calculation used to obtain the estimated glomerular filtration  rate (eGFR) is the CKD-EPI equation.          Assessment / Plan:        Rash and nonspecific skin eruption - annular, edematous, pseudovesicular plaques in sun exposed areas.  Ddx includes nummular dermatitis, polymorphous light eruption, lupus.    Punch biopsy procedure note:  Punch biopsy performed after verbal consent obtained. Area marked and prepped with alcohol. Approximately 1cc of 1% lidocaine with epinephrine injected. 4 mm disposable punch used to remove lesion. Hemostasis obtained and biopsy site closed with 1 - 2 Prolene sutures. Wound care instructions reviewed with patient and handout given.    -patient has mometasone ointment to use at home - BID x 2 weeks to body rash  -patient also has TAC 0.1 cream to use at home - BID x  2 weeks to nose rash  -discussed strict sun protection measures - advise neutrogena spf 50 or higher daily and wide brimmed hat    Post-inflammatory hyperpigmentation  On right arm from prior rashes (patient has had eczema in past and EM)  reassured           Follow-up in about 2 weeks (around 7/3/2018).

## 2018-06-19 NOTE — TELEPHONE ENCOUNTER
Please call patient and ask her why she thinks her echo should be moved up.    Her blood pressure is intermittently controlled.    Thanks,  WENDY

## 2018-06-26 ENCOUNTER — DOCUMENTATION ONLY (OUTPATIENT)
Dept: PHARMACY | Facility: CLINIC | Age: 73
End: 2018-06-26

## 2018-06-26 ENCOUNTER — OFFICE VISIT (OUTPATIENT)
Dept: NEPHROLOGY | Facility: CLINIC | Age: 73
End: 2018-06-26
Payer: MEDICARE

## 2018-06-26 VITALS
SYSTOLIC BLOOD PRESSURE: 130 MMHG | BODY MASS INDEX: 18.48 KG/M2 | DIASTOLIC BLOOD PRESSURE: 60 MMHG | HEIGHT: 64 IN | WEIGHT: 108.25 LBS

## 2018-06-26 DIAGNOSIS — D63.1 ANEMIA OF CHRONIC RENAL FAILURE, STAGE 4 (SEVERE): ICD-10-CM

## 2018-06-26 DIAGNOSIS — N18.4 ANEMIA OF CHRONIC RENAL FAILURE, STAGE 4 (SEVERE): ICD-10-CM

## 2018-06-26 DIAGNOSIS — I15.2 HYPERTENSION DUE TO ENDOCRINE DISORDER: ICD-10-CM

## 2018-06-26 DIAGNOSIS — N18.4 CKD (CHRONIC KIDNEY DISEASE), STAGE IV: Primary | ICD-10-CM

## 2018-06-26 PROCEDURE — 99499 UNLISTED E&M SERVICE: CPT | Mod: S$PBB,,, | Performed by: INTERNAL MEDICINE

## 2018-06-26 PROCEDURE — 3078F DIAST BP <80 MM HG: CPT | Mod: CPTII,S$GLB,, | Performed by: INTERNAL MEDICINE

## 2018-06-26 PROCEDURE — 99214 OFFICE O/P EST MOD 30 MIN: CPT | Mod: S$GLB,,, | Performed by: INTERNAL MEDICINE

## 2018-06-26 PROCEDURE — 3075F SYST BP GE 130 - 139MM HG: CPT | Mod: CPTII,S$GLB,, | Performed by: INTERNAL MEDICINE

## 2018-06-26 PROCEDURE — 99999 PR PBB SHADOW E&M-EST. PATIENT-LVL III: CPT | Mod: PBBFAC,,, | Performed by: INTERNAL MEDICINE

## 2018-06-26 RX ORDER — SODIUM CHLORIDE 0.9 % (FLUSH) 0.9 %
10 SYRINGE (ML) INJECTION
Status: CANCELLED | OUTPATIENT
Start: 2018-06-26

## 2018-06-26 RX ORDER — HEPARIN 100 UNIT/ML
500 SYRINGE INTRAVENOUS
Status: CANCELLED | OUTPATIENT
Start: 2018-06-26

## 2018-06-26 NOTE — PROGRESS NOTES
Adherence packed for 30 days:     Morning 1 card:  Asprin 81 mg  Carvedlol 25 mg  Citalopram 20 mg (1&1/2 tabs)  Famotidine 20 mg  Furosemide 80 mg  Hydralazine 100 mg  Hydroxychloroquine 200 mg  Isosorbide Mononitrate ER 60 mg  Levothyroxine 75 mcg  Nifedipine ER Osmotic 60 mg        Evening card:  Carvedilol 25 mg  Furosemide 80 mg  Hydralazine 100 mg  Hydroxychloroquine 200 mg  Nifedipine ER Osmotic 60 mg  Rosuvastatin 10 mg    Pt no longer taking Na Bicarb  Pt prefers to get meds from [facility]    [address]

## 2018-06-28 ENCOUNTER — TELEPHONE (OUTPATIENT)
Dept: NEPHROLOGY | Facility: CLINIC | Age: 73
End: 2018-06-28

## 2018-06-28 ENCOUNTER — OFFICE VISIT (OUTPATIENT)
Dept: PRIMARY CARE CLINIC | Facility: CLINIC | Age: 73
End: 2018-06-28
Payer: MEDICARE

## 2018-06-28 ENCOUNTER — HOSPITAL ENCOUNTER (OUTPATIENT)
Dept: RADIOLOGY | Facility: HOSPITAL | Age: 73
Discharge: HOME | End: 2018-06-28
Attending: INTERNAL MEDICINE
Payer: MEDICARE

## 2018-06-28 VITALS
BODY MASS INDEX: 19.54 KG/M2 | DIASTOLIC BLOOD PRESSURE: 54 MMHG | HEIGHT: 64 IN | OXYGEN SATURATION: 98 % | WEIGHT: 114.44 LBS | HEART RATE: 60 BPM | SYSTOLIC BLOOD PRESSURE: 144 MMHG

## 2018-06-28 DIAGNOSIS — I15.0 RENOVASCULAR HYPERTENSION: ICD-10-CM

## 2018-06-28 DIAGNOSIS — I1A.0 RESISTANT HYPERTENSION: ICD-10-CM

## 2018-06-28 DIAGNOSIS — N18.4 ANEMIA OF CHRONIC RENAL FAILURE, STAGE 4 (SEVERE): ICD-10-CM

## 2018-06-28 DIAGNOSIS — Z79.899 POLYPHARMACY: ICD-10-CM

## 2018-06-28 DIAGNOSIS — M25.511 ACUTE PAIN OF RIGHT SHOULDER: ICD-10-CM

## 2018-06-28 DIAGNOSIS — I50.33 ACUTE ON CHRONIC DIASTOLIC CONGESTIVE HEART FAILURE: ICD-10-CM

## 2018-06-28 DIAGNOSIS — G63 NEUROPATHY DUE TO SLE (SYSTEMIC LUPUS ERYTHEMATOSUS): ICD-10-CM

## 2018-06-28 DIAGNOSIS — D63.1 ANEMIA OF CHRONIC RENAL FAILURE, STAGE 4 (SEVERE): ICD-10-CM

## 2018-06-28 DIAGNOSIS — E55.9 HYPOVITAMINOSIS D: ICD-10-CM

## 2018-06-28 DIAGNOSIS — M32.9 NEUROPATHY DUE TO SLE (SYSTEMIC LUPUS ERYTHEMATOSUS): ICD-10-CM

## 2018-06-28 DIAGNOSIS — R63.4 WEIGHT LOSS, ABNORMAL: ICD-10-CM

## 2018-06-28 DIAGNOSIS — M32.8 OTHER FORMS OF SYSTEMIC LUPUS ERYTHEMATOSUS, UNSPECIFIED ORGAN INVOLVEMENT STATUS: Chronic | ICD-10-CM

## 2018-06-28 PROCEDURE — 3078F DIAST BP <80 MM HG: CPT | Mod: CPTII,S$GLB,, | Performed by: INTERNAL MEDICINE

## 2018-06-28 PROCEDURE — 3077F SYST BP >= 140 MM HG: CPT | Mod: CPTII,S$GLB,, | Performed by: INTERNAL MEDICINE

## 2018-06-28 PROCEDURE — 73030 X-RAY EXAM OF SHOULDER: CPT | Mod: TC,RT

## 2018-06-28 PROCEDURE — 73030 X-RAY EXAM OF SHOULDER: CPT | Mod: 26,RT,, | Performed by: RADIOLOGY

## 2018-06-28 PROCEDURE — 99215 OFFICE O/P EST HI 40 MIN: CPT | Mod: S$GLB,,, | Performed by: INTERNAL MEDICINE

## 2018-06-28 RX ORDER — FUROSEMIDE 40 MG/1
40 TABLET ORAL DAILY PRN
Qty: 90 TABLET | Refills: 3 | Status: SHIPPED | OUTPATIENT
Start: 2018-06-28 | End: 2019-05-27 | Stop reason: SDUPTHER

## 2018-06-28 RX ORDER — ERGOCALCIFEROL 1.25 MG/1
50000 CAPSULE ORAL
Qty: 12 CAPSULE | Refills: 3 | Status: SHIPPED | OUTPATIENT
Start: 2018-06-28 | End: 2018-11-09 | Stop reason: SDUPTHER

## 2018-06-28 NOTE — ASSESSMENT & PLAN NOTE
Upper lip numbness, SLE treated with plaquenil, followed by Rheum   · F/U Rheum  · Continue plaquenil

## 2018-06-28 NOTE — ASSESSMENT & PLAN NOTE
UTD on age-appropriate CA screenings, BMbx neg, chest CT in 2017 with stable lung nodule (no f/u recommended), abd CT in 2017 with no acute changes. CHF intermittently decompensates  · Monitor  · Optimize heart failure and other chronic conditions

## 2018-06-28 NOTE — Clinical Note
Dear Dr Ramirez, Ms Arnold recently had a skin biopsy that indicated cutaneous lupus involvement. She also complains today of upper lip numbness that may be an additional presentation of her lupus.  You will see her on 8/1/18. I wanted to make you aware of these findings should they impact your management plan for her lupus.  Thanks, KJ

## 2018-06-28 NOTE — TELEPHONE ENCOUNTER
----- Message from Shana Ann sent at 6/28/2018  8:36 AM CDT -----  Contact: Pt   [phone]  Needs Advice    Reason for call:    Appt for Iron Injection   Communication Preference:  By Phone  Additional Information:  Give the pt a call back to confirm       Informed pt that infusion will call her to set up appt after insurance approves infusion

## 2018-06-28 NOTE — ASSESSMENT & PLAN NOTE
Diagnosed in 1990s, pos CARYN, SSB. Biopsy showed cutaneous involvment  · Continue plaquenil  · Monitor  · F/U Rheum

## 2018-06-28 NOTE — ASSESSMENT & PLAN NOTE
Newly compliant with BP and CHF meds  · Restart digital HTN program  · PCP to manage BP  · Lasix PRN  · Continue coreg 25mg BID, hydralazine 100mg BID, isosorbide mononitrate 60mg, nifedipine 60mg BID

## 2018-06-28 NOTE — PATIENT INSTRUCTIONS
TODAY:  - PCP will message pharmacy about removing lasix from pill pack and adding weekly Vit D  - change vit D to weekly   - next echo will determine whether you need procedure for aortic valve  - continue to eat a healthy diet  - PCP to message Rheumatology about your skin biopsy results and lip numbness  - try coban wrap with gauze instead of band-aids

## 2018-06-28 NOTE — Clinical Note
"Misha Suero, Ms Arnold is doing really well! She feels great and states "I'm in good hands, thanks to Niom's persistence." As you may recall, Ms Arnold was hesitant to change her primary care management plan. She has remained out of the hospital because "now I know what to do."  Thank you for all that you do! KJ"

## 2018-06-28 NOTE — PROGRESS NOTES
"Primary Care Provider Appointment    Subjective:      Patient ID: Ewelina Arnold is a 72 y.o. female with newly controlled HTN, MDD, CAD, CHF, AVS    Chief Complaint: Thyroid Problem and Follow-up    Patient with newly controlled HTN. Is participating in [facility] and the digital HTN. Is followed by Renal and Cardiology for BP management. Had 4 hospitalizations in last 8 months for HTN emergency, flash pulm edema. No hospital admits since 5/3/18. She reports feeling better now that BP is controlled.    Patient with recent arm biopsy for new anular lesion, results consistent with lupus. Sees Derm on Tues, Rheum on 8/1/18. Is compliant with plaquenil.    She complains of her top lip numbness "for a good little while" which totals about 6 mos. She has had no recent dental work nor trauma to that area.    Has mod-severe aortic stenosis on echo in 4/2018. Per interventional cardiology, she would not benefit from TAVR at this time. Has repeat echo on 8/21/18. Called office to move this up because of her acute symptom of SOB. This resolved with oral lasix, she is now comfortable with waiting for scheduled echo.    Her weight fluctuates based on volume status. She takes lasix usually, was stopped by renal for daily dosing due to worsening CKD. She only takes lasix 40mg PRN. Last dose taken on Monday when she felt SOB, which is now resolved.    Complains of a "knot" on her R shoulder, where her shoulder has different appearance than the other.    She has anemia (possible symptomatic with recent experience of shortness of breath). Nephrology ordered iron infusion, which she plans to have on Tuesday.    Her Vit D remains low, she is supplementing with D once monthly.    Pills packed by pharmacy as follows on 6/26/18:  Adherence packed for 30 days:     Morning 1 card:  Asprin 81 mg  Carvedlol 25 mg  Citalopram 20 mg (1&1/2 tabs)  Famotidine 20 mg  Furosemide 80 mg (PATIENT TAKING OUT OF PILL " PACK)  Hydralazine 100 mg  Hydroxychloroquine 200 mg  Isosorbide Mononitrate ER 60 mg  Levothyroxine 75 mcg  Nifedipine ER Osmotic 60 mg        Evening card:  Carvedilol 25 mg  Furosemide 80 mg  Hydralazine 100 mg  Hydroxychloroquine 200 mg  Nifedipine ER Osmotic 60 mg  Rosuvastatin 10 mg     Pt no longer taking Na Bicarb  Pt prefers to get meds from [facility]     45''      Electronically signed by Kiesha Rosario PharmD at 6/26/2018  2:36 PM          Past Surgical History:   Procedure Laterality Date    CARDIAC CATHETERIZATION  07/27/2011    x1    CHOLECYSTECTOMY  1999    COLON SURGERY  2000 & 2003    partial removal    COLONOSCOPY N/A 4/14/2016    Procedure: COLONOSCOPY;  Surgeon: Jose Steen MD;  Location: [facility] ([address] [place] [address]);  Service: Endoscopy;  Laterality: N/A;  prep 2 days prior light meals only/clear liquid day before  and 4 dulcolax tabs (5 mgs) at noon    COLONOSCOPY N/A 9/14/2017    Procedure: COLONOSCOPY;  Surgeon: Jose Steen MD;  Location: [facility] ([address] [place] [address]);  Service: Endoscopy;  Laterality: N/A;    EYE SURGERY      HAND SURGERY Bilateral 1996 & 1997    due to carpal tunnel     HERNIA REPAIR      HYSTERECTOMY  1983    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    OOPHORECTOMY      removed one    Peripheral Iridotomy both eyes  1994    laser angle correction    THYROIDECTOMY, PARTIAL  2006    to remove two nodules and one-half thyroid       Past Medical History:   Diagnosis Date    Acute hypoxemic respiratory failure 4/28/2018    Acute on chronic diastolic congestive heart failure 11/17/2017    Allergy     Anatomical narrow angle glaucoma     Anemia     States diagnosed about a month ago    Aortic atherosclerosis     Arthritis     Cataract     CHF (congestive heart failure)     Chronic kidney disease     Chronic sciatica of left side     Right lower back pain due to sciatica    Coronary artery disease     Depression     Dry eyes     GERD (gastroesophageal  "reflux disease)     History of colon cancer     Hyperaldosteronism     Hyperlipidemia     Hypertension     Hypothyroidism     Keloid cicatrix     Left ventricular diastolic dysfunction with preserved systolic function     Lupus (systemic lupus erythematosus) 8/17/2012    Malnutrition 5/16/2017    Osteoarthritis of cervical spine 8/17/2012    Osteopenia     PAD (peripheral artery disease)     FRAN (renal artery stenosis)        Review of Systems   Constitutional: Negative for activity change and appetite change.   Respiratory: Negative for cough and shortness of breath.    Cardiovascular: Negative for leg swelling.   Genitourinary: Negative for difficulty urinating and dysuria.   Neurological: Positive for dizziness and light-headedness.   Hematological: Bruises/bleeds easily.   Psychiatric/Behavioral: Positive for agitation. Negative for behavioral problems, confusion, decreased concentration and dysphoric mood.       Objective:   BP (!) 144/54 (BP Location: Right arm, Patient Position: Sitting, BP Method: Medium (Manual))   Pulse 60   Ht 5' 4" (1.626 m)   Wt 51.9 kg (114 lb 6.7 oz)   LMP  (LMP Unknown) Comment: pt had taken bp meds less than an hour ago  SpO2 98%   BMI 19.64 kg/m²     Digital HTN Readings  Last 5 Patient Entered Readings                                      Current 30 Day Average: 158/73      Recent Readings 6/17/2018 6/16/2018 6/16/2018 6/15/2018 6/15/2018     SBP (mmHg) 162 164 130 113 125     DBP (mmHg) 75 84 63 47 65     Pulse 63 69 62 40 54                     Physical Exam   Constitutional: She is oriented to person, place, and time. She appears well-developed and well-nourished.   HENT:   Head: Normocephalic and atraumatic.   Decreased sensation in maxillary skin   Neck: Normal range of motion.   Musculoskeletal: She exhibits edema and deformity.   Deformity of R shoulder, no TTP   Neurological: She is alert and oriented to person, place, and time.   Skin:   Ecchymoses and " purpura on UE bilaterally  Decreased sensation in maxillary skin   Psychiatric: She has a normal mood and affect. Her behavior is normal. Thought content normal.   Vitals reviewed.      Lab Results   Component Value Date    WBC 3.54 (L) 06/04/2018    HGB 8.3 (L) 06/04/2018    HCT 25.7 (L) 06/04/2018     06/04/2018    CHOL 128 05/15/2018    TRIG 79 05/15/2018    HDL 48 05/15/2018    ALT 14 05/23/2018    AST 41 (H) 05/23/2018     06/04/2018    K 4.1 06/04/2018     06/04/2018    CREATININE 3.4 (H) 06/04/2018    BUN 76 (H) 06/04/2018    CO2 22 (L) 06/04/2018    TSH 1.399 05/15/2018    INR 1.0 04/28/2018    GLUF 79 12/02/2011    HGBA1C 5.7 01/24/2017         Assessment:   72 y.o. 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)     Upper lip numbness, SLE treated with plaquenil, followed by Rheum   · F/U Rheum  · Continue plaquenil            Cardiac/Vascular    Resistant hypertension     Newly compliant with BP and CHF meds  · Restart digital HTN program  · PCP to manage BP  · Lasix PRN  · Continue coreg 25mg BID, hydralazine 100mg BID, isosorbide mononitrate 60mg, nifedipine 60mg BID         Renovascular hypertension     Newly compliant with BP and CHF meds  · Restart digital HTN program  · PCP to manage BP  · Lasix PRN  · Continue coreg 25mg BID, hydralazine 100mg BID, isosorbide mononitrate 60mg, nifedipine 60mg BID         Acute on chronic diastolic congestive heart failure     Grade 2 diastolic failure, systolic HF, aortic stenosis  · Decrease lasix to 40mg PRN  · Remove lasix from pill pack  · Echo in 2 mos  · TAVR deemed not necessary at this time         Relevant Medications    furosemide (LASIX) 40 MG tablet       Immunology/Multi System    Other forms of systemic lupus erythematosus  (Chronic)     Diagnosed in 1990s, pos CARYN, SSB. Biopsy showed cutaneous involvment  · Continue plaquenil  · Monitor  · F/U Rheum            Oncology    Anemia of chronic renal failure, stage 4 (severe)     Followed by Nephrology, PRN iron infusions  · F/U Nephrology  · Iron infusion next week            Endocrine    Weight loss, abnormal     UTD on age-appropriate CA screenings, BMbx neg, chest CT in 2017 with stable lung nodule (no f/u recommended), abd CT in 2017 with no acute changes. CHF intermittently decompensates  · Monitor  · Optimize heart failure and other chronic conditions         Hypovitaminosis D     Vit D low (level 21) on Vit D2 monthly  · Increase to once weekly         Relevant Medications    ergocalciferol (VITAMIN D2) 50,000 unit Cap       Orthopedic    Acute pain of right shoulder     R shoulder MSK abnormalities, no evidence of infection  · Shoulder XR         Relevant Orders    X-Ray Shoulder Trauma 3 view Right       Other    Polypharmacy     Complicated regimen with numerous cardiac and nephro meds, at risk for hospitalization due to med noncompliance  · Pill packing at pharmacy  · Close follow-up               Health Maintenance       Date Due Completion Date    Influenza Vaccine 08/01/2018 10/10/2017    Override on 10/9/2015: Done    DEXA SCAN 01/05/2019 1/5/2016    High Dose Statin 06/19/2019 6/19/2018    Mammogram 08/23/2019 8/23/2017    Colonoscopy 09/14/2022 9/14/2017    Lipid Panel 05/15/2023 5/15/2018    TETANUS VACCINE 06/29/2026 6/29/2016          Follow-up in about 2 months (around 8/28/2018). . One hour spent with this patient today, half of that in counseling.    Amarilys Johns MD/MPH  Internal Medicine  [facility]  [phone]

## 2018-06-28 NOTE — ASSESSMENT & PLAN NOTE
Grade 2 diastolic failure, systolic HF, aortic stenosis  · Decrease lasix to 40mg PRN  · Remove lasix from pill pack  · Echo in 2 mos  · TAVR deemed not necessary at this time

## 2018-07-02 ENCOUNTER — OUTPATIENT CASE MANAGEMENT (OUTPATIENT)
Dept: ADMINISTRATIVE | Facility: OTHER | Age: 73
End: 2018-07-02

## 2018-07-02 ENCOUNTER — PATIENT OUTREACH (OUTPATIENT)
Dept: OTHER | Facility: OTHER | Age: 73
End: 2018-07-02

## 2018-07-02 NOTE — PROGRESS NOTES
7/2/18  Summary:  Chart reviewed in epic. Pt in communication with dr. chavira and dr. gaitan for blood pressure management.  Blood pressure digital monitoring in place and they are following up with pt accordingly    Interventions  hbp  Blood pressure range is at goal.  She is receiving her medications packaging from retail pharmacy and feels this is a huge help in medication administration  Short Term Goals  Patient/caregiver will measure and record the blood pressure 2 times per day for 1 week.     Status  ·  met  Pt is using the digital cuff and the results are transmitted automatically.  Blood pressure is trending toward goal now that the apresoline is restarted      Patient/caregiver will obtain blood pressure cuff to support disease process within 1 week.     Status  ·  met  Pt consented to enter digital blood pressure monitoring again and is compliant with taking blood pressure daily        chf pt states wt is up to 114 she reviewed wt trends she had documented with the pcp dr. Templeton and the lasix is only prn for now.                                                                                           Status short term goals 1   met  Pt is recording wts and brought log for dr. Templeton to review.  Pt signed up for the digital hypertension program as advised  Short Term Goals  2.  Patient/Caregiver will verbalize importance of early intervention for symptoms of CHF and verbalize 2 ways of preventing complications due to disease process within 1 month  Status   met  Is is aware that sodium intake can contribute too fluid retention and she is monitoring her intake.  She can read labels and voiced that daily sodium intake should not exceed 2000 mg.      All short term and long term goals met pt is feeling much better about the coordination of care she is receiving with dr. chavira and her specialist.    Advised pt of case closure and that she can contact this nurse if any new needs arise and she verbalized  understanding.

## 2018-07-02 NOTE — PROGRESS NOTES
Last 5 Patient Entered Readings                                      Current 30 Day Average: 153/72     Recent Readings 7/2/2018 7/1/2018 7/1/2018 6/30/2018 6/29/2018    SBP (mmHg) 158 177 150 142 151    DBP (mmHg) 75 77 70 67 73    Pulse 70 73 66 60 65        7/2-patient answered requesting a call on 7/3/

## 2018-07-03 ENCOUNTER — TELEPHONE (OUTPATIENT)
Dept: RHEUMATOLOGY | Facility: CLINIC | Age: 73
End: 2018-07-03

## 2018-07-03 ENCOUNTER — OFFICE VISIT (OUTPATIENT)
Dept: DERMATOLOGY | Facility: CLINIC | Age: 73
End: 2018-07-03
Payer: MEDICARE

## 2018-07-03 DIAGNOSIS — L93.0 LUPUS ERYTHEMATOSUS, UNSPECIFIED FORM: Primary | ICD-10-CM

## 2018-07-03 DIAGNOSIS — M32.19 OTHER SYSTEMIC LUPUS ERYTHEMATOSUS WITH OTHER ORGAN INVOLVEMENT: Primary | Chronic | ICD-10-CM

## 2018-07-03 DIAGNOSIS — L29.9 ITCHING: ICD-10-CM

## 2018-07-03 DIAGNOSIS — L81.9 HYPERPIGMENTATION: ICD-10-CM

## 2018-07-03 PROCEDURE — 99213 OFFICE O/P EST LOW 20 MIN: CPT | Mod: S$GLB,,, | Performed by: DERMATOLOGY

## 2018-07-03 RX ORDER — MOMETASONE FUROATE 1 MG/G
OINTMENT TOPICAL DAILY
Qty: 45 G | Refills: 3 | Status: ON HOLD | OUTPATIENT
Start: 2018-07-03 | End: 2018-10-11 | Stop reason: HOSPADM

## 2018-07-03 NOTE — Clinical Note
KENDRICK - She's now developing cutaneous lupus on sun exposed areas - not sure if SLE is contributing to any of the medical issues she's had over the past 6 months - several CHF admissions, weight loss, fatigue...also curious if you think hydralazine is worth trying to get off.  Thanks and let me know how I can be of help with managing her skin disease. - Cristina

## 2018-07-03 NOTE — PROGRESS NOTES
Subjective:       Patient ID:  Ewelina Arnold is a 72 y.o. female who presents for No chief complaint on file.    HPI  73 yo female with SLE on Plaquenil 200 mg po bid since 1990s.  She was last seen on 6/19/18 for skin biopsy of annular rash in photodistributed areas.  Biopsy results are consistent with cutaneous lupus.  Rash is on neck, upper arms, back.  Since I last saw her she has also developed the rash on dorsum of nose.  Also c/o itching of the left posterior shoulder. No new soaps, detergents.  Using TAC cream, and elocon ointment to the rash.  Still not better.    She has not seen rheumatology since 11/2017, plans to follow up soon.      In review of recent history, she has developed cardiac issues/CHF over past six months and was hospitalized several times.  Over that period of time her dose of hydralazine, which was 10 mg bid last fall, has been increased to 100 mg po bid.    She does not wear sunscreen daily nor a hat.    Review of Systems   Constitutional: Positive for fatigue. Negative for fever.   Musculoskeletal: Negative for joint swelling and arthralgias.   Skin: Positive for itching, rash and sun sensitivity. Negative for daily sunscreen use, activity-related sunscreen use and wears hat.        Objective:    Physical Exam   Constitutional: She appears well-developed and well-nourished. No distress.   Neurological: She is alert and oriented to person, place, and time. She is not disoriented.   Psychiatric: She has a normal mood and affect.   Skin:   Areas Examined (abnormalities noted in diagram):   Scalp / Hair Palpated and Inspected  Head / Face Inspection Performed  Neck Inspection Performed  Chest / Axilla Inspection Performed  Back Inspection Performed  RUE Inspected  LUE Inspection Performed                   Diagram Legend     Erythematous scaling macule/papule c/w actinic keratosis       Vascular papule c/w angioma      Pigmented verrucoid papule/plaque c/w seborrheic keratosis       Yellow umbilicated papule c/w sebaceous hyperplasia      Irregularly shaped tan macule c/w lentigo     1-2 mm smooth white papules consistent with Milia      Movable subcutaneous cyst with punctum c/w epidermal inclusion cyst      Subcutaneous movable cyst c/w pilar cyst      Firm pink to brown papule c/w dermatofibroma      Pedunculated fleshy papule(s) c/w skin tag(s)      Evenly pigmented macule c/w junctional nevus     Mildly variegated pigmented, slightly irregular-bordered macule c/w mildly atypical nevus      Flesh colored to evenly pigmented papule c/w intradermal nevus       Pink pearly papule/plaque c/w basal cell carcinoma      Erythematous hyperkeratotic cursted plaque c/w SCC      Surgical scar with no sign of skin cancer recurrence      Open and closed comedones      Inflammatory papules and pustules      Verrucoid papule consistent consistent with wart     Erythematous eczematous patches and plaques     Dystrophic onycholytic nail with subungual debris c/w onychomycosis     Umbilicated papule    Erythematous-base heme-crusted tan verrucoid plaque consistent with inflamed seborrheic keratosis     Erythematous Silvery Scaling Plaque c/w Psoriasis     See annotation          FINAL PATHOLOGIC DIAGNOSIS  1. Skin, left upper arm, punch biopsy:  - VACUOLAR INTERFACE DERMATITIS, CONSISTENT WITH A CONNECTIVE TISSUE DISEASE PROCESS  SUCH AS LUPUS.  MICROSCOPIC DESCRIPTION: Sections show a punch biopsy of skin extending to the level of the subcutis.  Sections show focal parakeratosis alternating with compact orthokeratosis. The epidermis is atrophic. There is  multifocal vacuolar interface alteration noted along the dermoepidermal junction in association with apoptotic  keratinocytes. The underlying dermis is edematous with mildly increased dermal mucin. There is a superficial and  deep perivascular lymphocyte-predominant inflammatory infiltrate.    Assessment / Plan:        Lupus erythematosus, cutaneous -  biopsy proven - spreading to right eyebrow and dorsum of nose since I last saw her for the biopsy.  With known SLE on Plaquenil 200 mg po bid x decades.  I would like for her to see her rheumatologist to evaluate for any systemic involvement that may be flaring coincident with her skin.  In the past she reports having a butterfly type rash at one point; she is developing a nasal edematous lesion that may further develop into a malar rash.  Of note, she saw another dermatologist in 2016 for an annular eruption thought to be erythema multiforme that resolved with topical Elocon ointment - its distribution was similar to this current flare.    In review of her recent heart failure and hypertension management, her dose of hydralazine is now 100 mg po BID.  Hydralazine is associated with drug induced lupus, and has been described to potentially exacerbate the condition in those with idiopathic SLE.  She did have an antihistone AB checked in 3/2018 that was negative.  I would appreciate rheumatology input on whether this medication should be reevaluated for treatment of her CHF and hypertension given this development of cutaneous lupus flare.    For the time being, I will treat her skin involvement with Elocon ointment twice daily to affected areas, and discussed diligent sun protection - daily sunblock and hat if going outside, even on rainy days.  Await rheumatology's evaluation for any need in adjusting her systemic therapy.      -     mometasone (ELOCON) 0.1 % ointment; Apply topically once daily. To rashes for 14 days  Dispense: 45 g; Refill: 3    Itching  Left shoulder  Elocon ointment bid  Fragrance free soap/detergent    Hyperpigmentation  Left cheek - from a fall - post inflammatory - rec daily sunblock, reassured             Follow-up in about 3 months (around 10/3/2018).

## 2018-07-03 NOTE — PROGRESS NOTES
Last 5 Patient Entered Readings                                      Current 30 Day Average: 153/72     Recent Readings 7/2/2018 7/1/2018 7/1/2018 6/30/2018 6/29/2018    SBP (mmHg) 158 177 150 142 151    DBP (mmHg) 75 77 70 67 73    Pulse 70 73 66 60 65        7/3-Patient answered stating she is not feeling well. She requested a phone call next week.

## 2018-07-03 NOTE — TELEPHONE ENCOUNTER
----- Message from Myriam Curtis sent at 7/3/2018 11:09 AM CDT -----  Contact: self  Pt called requesting a call back from [facility] regarding an apt that she needs because she's having a flare up. Pt could be reached at [phone]

## 2018-07-03 NOTE — TELEPHONE ENCOUNTER
Had a bx done in derm,  States the derm dr told her she needed to be seen.  I informed patient she has 8/1 appt. And I would ask you to look at the bx report and if she needs to be seen sooner, we will contact her.  She also would like to inform Dr. Ramirez, she now has CHF, that was diagnosed in January

## 2018-07-04 NOTE — PROGRESS NOTES
Subjective:       Patient ID: Ewelina Arnold is a 72 y.o. Black or  female who presents for re-evaluation of progression of Chronic Kidney Disease    HPI    Ms. 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  Patient reports blood pressure improved and relatively stable since last visit.  She reports weight down to ~108-110kg, previously above 113kg.  She otherwise denies any fever, chest pain, shortness of breath, abdominal pain, dysuria/hematuria.     Review of Systems   Constitutional: Negative for appetite change, fatigue and fever.   Respiratory: Negative for chest tightness and shortness of breath.    Cardiovascular: Negative for chest pain and leg swelling.   Gastrointestinal: Negative for abdominal pain, constipation, diarrhea, nausea and vomiting.   Genitourinary: Negative for difficulty urinating, dysuria, flank pain, frequency, hematuria and urgency.   Musculoskeletal: Negative for arthralgias, joint swelling and myalgias.   Skin: Negative for rash and wound.   Neurological: Negative for dizziness, weakness and light-headedness.   All other systems reviewed and are negative.      Objective:      Physical Exam   Constitutional: She appears well-developed and well-nourished.   Cardiovascular: Normal rate, regular rhythm and normal heart sounds.  Exam reveals no gallop and no friction rub.    No murmur heard.  Pulmonary/Chest: Effort normal and breath sounds normal. No respiratory distress. She has no wheezes. She has no rales.   Abdominal: Soft. Bowel sounds are normal. There is no tenderness.   Musculoskeletal: She exhibits no edema.   Neurological: She is alert.   Skin: Skin is warm and dry. No rash noted. No erythema.   Psychiatric: She has a normal mood and affect.        Assessment:       1. CKD (chronic kidney disease), stage IV    2. Anemia of chronic renal failure, stage 4 (severe)    3. Hypertension due to endocrine disorder    4. Solitary kidney        Plan:     Ms. 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  Patient previously with acute kidney injury, likely due to moderate volume depletion.  Will trend symptoms/creatinine closely, will phone review BP diary and daily weights within a few days.  Encouraged oral/fluid intake for now, suspect dry weight ~110kg, further recommendations pending results of above, patient voiced understanding of above, agreeable to plan as noted.    - Anemia: Hgb below goal, will give IV iron, will consider erythropoetin therapy now that BP controlled  - Bone/mineral metabolism: patient with secondary hyperparathyroidism, PTH at goal for stage CKD, patient on ergocalciferol for VitD deficiency    Return to clinic in 6-8 weeks with renal/heme panel, iron/TIBC/ferritin, urinalysis/culture, urine protein/creatinine ratio prior to next visit

## 2018-07-04 NOTE — TELEPHONE ENCOUNTER
Thank you for the note.   I will schedule lab and also ask Dr. Johns to stop hydralazine in favor of other anti-htn/chf med     Hui I put in new standing labs; please schedule this or next week and put her on cancellation list for sooner appt (currently in Aug)    Luciano Ramirez MD  Rheumatology  Mobile: [phone]

## 2018-07-04 NOTE — TELEPHONE ENCOUNTER
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  Thanks and let me know how I can be of help with managing her skin disease. - Cristina

## 2018-07-05 ENCOUNTER — TELEPHONE (OUTPATIENT)
Dept: INTERNAL MEDICINE | Facility: CLINIC | Age: 73
End: 2018-07-05

## 2018-07-05 NOTE — TELEPHONE ENCOUNTER
Please let patient know that her dermatologist (skin doctor) and rheumatologist (lupus doctor) think that the hydralazine is worsening her lupus. Can she come in today to get that med out of her pill pack?    I can see her today and we can send her to pharmacy after that. Otherwise, I can educate her on which pill in her pill pack is hydralazine and she can take it out herself. Whatever is convenient for her. Regardless, she needs to stop the hydralazine.    Thanks,  WENDY

## 2018-07-06 ENCOUNTER — LAB VISIT (OUTPATIENT)
Dept: LAB | Facility: HOSPITAL | Age: 73
End: 2018-07-06
Attending: INTERNAL MEDICINE
Payer: MEDICARE

## 2018-07-06 DIAGNOSIS — M32.19 OTHER SYSTEMIC LUPUS ERYTHEMATOSUS WITH OTHER ORGAN INVOLVEMENT: Chronic | ICD-10-CM

## 2018-07-09 ENCOUNTER — TELEPHONE (OUTPATIENT)
Dept: INTERNAL MEDICINE | Facility: CLINIC | Age: 73
End: 2018-07-09

## 2018-07-09 ENCOUNTER — NURSE TRIAGE (OUTPATIENT)
Dept: ADMINISTRATIVE | Facility: CLINIC | Age: 73
End: 2018-07-09

## 2018-07-09 DIAGNOSIS — I16.9 HYPERTENSIVE CRISIS: ICD-10-CM

## 2018-07-09 NOTE — TELEPHONE ENCOUNTER
----- Message from Albertina Scott MA sent at 7/9/2018  8:33 AM CDT -----  Contact: self/[phone]  [facility]   ----- Message -----  From: Kaylan Sagastume  Sent: 7/9/2018   8:16 AM  To: Nat Canseco Staff    Patient called in regards needing to find out if there is any special instruction for her visit tomorrow 07/10/18.  Also patient blood pressure has been high at 7:52pm 194/84 and then at 8:21pm patient blood pressure was 204/85, 8:49 197/80, today 7:11 152/66 (transfered call to nurse on call). Thank you!!!

## 2018-07-09 NOTE — TELEPHONE ENCOUNTER
Please call patient with update on BPs.    - Has she stopped the hydralazine?  - Is she having any unwanted symptoms (other than elevated BP)  - I will work with her cardiologist on which med we can safely add.    Thanks,  KJ

## 2018-07-09 NOTE — TELEPHONE ENCOUNTER
Ms Arnold' BP is currently uncontrolled due to discontinuation of hydralazine on Friday. She experienced a lupus flair while on this med, therefore I DC'd it last week after speaking with Dermatology and Rheumatology.    Could you recommend another BP med to help control her pressure? She is currently compliant with BID dosing of her BB, CCB, vasodilator in pill packs from primary care pharmacy. Also uses lasix PRN with excellent control of edema. Would clonidine be appropriate at this time? She is maxed on all other meds.    Thanks,  KJ    ----- Message from Albertina Scott MA sent at 7/9/2018  8:33 AM CDT -----  Contact: self/[phone]  [facility]   ----- Message -----  From: Kaylan Sagastume  Sent: 7/9/2018   8:16 AM  To: Nat Canseco Staff     Patient called in regards needing to find out if there is any special instruction for her visit tomorrow 07/10/18.  Also patient blood pressure has been high at 7:52pm 194/84 and then at 8:21pm patient blood pressure was 204/85, 8:49 197/80, today 7:11 152/66 (transfered call to nurse on call). Thank you!!!

## 2018-07-10 ENCOUNTER — INFUSION (OUTPATIENT)
Dept: INFECTIOUS DISEASES | Facility: HOSPITAL | Age: 73
End: 2018-07-10
Attending: INTERNAL MEDICINE
Payer: MEDICARE

## 2018-07-10 VITALS
DIASTOLIC BLOOD PRESSURE: 63 MMHG | OXYGEN SATURATION: 96 % | HEART RATE: 57 BPM | TEMPERATURE: 99 F | BODY MASS INDEX: 19.54 KG/M2 | HEIGHT: 64 IN | RESPIRATION RATE: 16 BRPM | WEIGHT: 114.44 LBS | SYSTOLIC BLOOD PRESSURE: 134 MMHG

## 2018-07-10 DIAGNOSIS — D63.1 ANEMIA OF CHRONIC RENAL FAILURE, STAGE 4 (SEVERE): Primary | ICD-10-CM

## 2018-07-10 DIAGNOSIS — N18.4 ANEMIA OF CHRONIC RENAL FAILURE, STAGE 4 (SEVERE): Primary | ICD-10-CM

## 2018-07-10 PROCEDURE — 25000003 PHARM REV CODE 250: Performed by: INTERNAL MEDICINE

## 2018-07-10 PROCEDURE — 63600175 PHARM REV CODE 636 W HCPCS: Mod: JG | Performed by: INTERNAL MEDICINE

## 2018-07-10 PROCEDURE — 96365 THER/PROPH/DIAG IV INF INIT: CPT

## 2018-07-10 RX ORDER — SODIUM CHLORIDE 0.9 % (FLUSH) 0.9 %
10 SYRINGE (ML) INJECTION
Status: CANCELLED | OUTPATIENT
Start: 2018-07-10

## 2018-07-10 RX ORDER — DOXAZOSIN 1 MG/1
1 TABLET ORAL NIGHTLY
Qty: 30 TABLET | Refills: 11 | Status: SHIPPED | OUTPATIENT
Start: 2018-07-10 | End: 2018-07-13 | Stop reason: SDUPTHER

## 2018-07-10 RX ORDER — HEPARIN 100 UNIT/ML
500 SYRINGE INTRAVENOUS
Status: DISCONTINUED | OUTPATIENT
Start: 2018-07-10 | End: 2018-07-10 | Stop reason: HOSPADM

## 2018-07-10 RX ORDER — SODIUM CHLORIDE 0.9 % (FLUSH) 0.9 %
10 SYRINGE (ML) INJECTION
Status: DISCONTINUED | OUTPATIENT
Start: 2018-07-10 | End: 2018-07-10 | Stop reason: HOSPADM

## 2018-07-10 RX ORDER — HEPARIN 100 UNIT/ML
500 SYRINGE INTRAVENOUS
Status: CANCELLED | OUTPATIENT
Start: 2018-07-10

## 2018-07-10 RX ADMIN — FERUMOXYTOL 510 MG: 510 INJECTION INTRAVENOUS at 10:07

## 2018-07-10 NOTE — PROGRESS NOTES
Pt arrived to infusion suite for IV feraheme.  Observed patient for 1 hour post infusion for possible reaction.  Pt tolerated well and left in NAD.

## 2018-07-10 NOTE — TELEPHONE ENCOUNTER
Please let patient know that her cardiologists recommend cardura for her new BP med. I sent it to [facility] on [address].    She should start with 1mg tablet daily, then we will increase by doubling the dose weekly up to 8mg daily.     Here is the schedule:  - this week 1mg  - next week 2mg  - the following week 4mg  - then the next following week 8mg    Her BP will be elevated for the next few weeks, but it will eventually get controlled without exacerbating her lupus.    Please encourage her to stay in touch and call us if she needs to be seen in clinic urgently (instead of going to ED).    Thanks,  KJ

## 2018-07-11 NOTE — PROGRESS NOTES
Last 5 Patient Entered Readings                                      Current 30 Day Average: 155/72     Recent Readings 7/10/2018 7/10/2018 7/9/2018 7/9/2018 7/9/2018    SBP (mmHg) 169 136 173 164 174    DBP (mmHg) 75 68 76 80 79    Pulse 65 68 67 63 61        7/11-patient answered stating she was having her hair washed. Requested a phone call on another day.

## 2018-07-13 ENCOUNTER — TELEPHONE (OUTPATIENT)
Dept: INTERNAL MEDICINE | Facility: CLINIC | Age: 73
End: 2018-07-13

## 2018-07-13 ENCOUNTER — NURSE TRIAGE (OUTPATIENT)
Dept: ADMINISTRATIVE | Facility: CLINIC | Age: 73
End: 2018-07-13

## 2018-07-13 DIAGNOSIS — I16.9 HYPERTENSIVE CRISIS: ICD-10-CM

## 2018-07-13 RX ORDER — DOXAZOSIN 1 MG/1
1 TABLET ORAL NIGHTLY
Qty: 90 TABLET | Refills: 3 | Status: ON HOLD | OUTPATIENT
Start: 2018-07-13 | End: 2018-10-11 | Stop reason: HOSPADM

## 2018-07-13 NOTE — TELEPHONE ENCOUNTER
----- Message from Albertina Scott MA sent at 7/13/2018 11:26 AM CDT -----  Contact: Patient [phone]   Please, advise   ----- Message -----  From: Anthony Ribeiro  Sent: 7/13/2018  11:07 AM  To: Nat Canseco Staff    Type: Referral to a Specialist    Where would you like a referral to? [facility]    Have you previously requested? no    Is the physician within the [facility]? No     Name and phone number of specialist: [facility] Contact [phone] or [phone] Jarret Knox email: [email or web address]    Reason for appointment:    Is an appointment scheduled with specialist? When? No     Comments: patient would like to have Nurse to call to inform what is the response of the Dr after giving the Blood Pressure Readings?    Please call an advise  Thank you

## 2018-07-13 NOTE — TELEPHONE ENCOUNTER
Cardura 1mg sent to [facility] pharmacy. We will not go up on this dose.    WENDY Espinal

## 2018-07-13 NOTE — TELEPHONE ENCOUNTER
Can you ask her why she's choosing [facility] for cardiac rehab? We have one here at [facility] too.    I'll have to work with her cardiologists on this to make sure we can get insurance coverage. We will let her know after I get some advice.    Thanks,  WENDY

## 2018-07-13 NOTE — TELEPHONE ENCOUNTER
Please inform her to:  - continue cardura  - take lasix as needed if she is concerned about leg swelling, she does not need the metolazone at this time  - I need to speak with her cardiologists about insurance coverage for rehab, i'll keep her posted.    Thanks,  KJ

## 2018-07-13 NOTE — TELEPHONE ENCOUNTER
Reason for Disposition   MILD difficulty breathing (e.g., minimal/no SOB at rest, SOB with walking, pulse < 100) of new onset or worse than normal    Protocols used: ST BREATHING DIFFICULTY-A-OH    Pt wondering if she should take lasix and metolazone with her BP this AM at 109/55. States she feels SOB when she walks and feels stomach is tight, states she thinks taking fluid pills will help her. Pt would like to speak to PCP about those 2 meds and also about another BP med to see if she should take it when BP is low. Pt would like someone to call her today. Please call to advise.

## 2018-07-13 NOTE — TELEPHONE ENCOUNTER
Please let her know to defintiely take the lasix/metolazone. She should take the metolazone 30 min before the lasix.    What have her BPs been on the new med cardura? What dose is she taking now? If her BPs are controlled then we shouldn't go up on the dose.    Thanks,  WENDY

## 2018-07-16 ENCOUNTER — TELEPHONE (OUTPATIENT)
Dept: INTERNAL MEDICINE | Facility: CLINIC | Age: 73
End: 2018-07-16

## 2018-07-16 ENCOUNTER — PATIENT MESSAGE (OUTPATIENT)
Dept: PRIMARY CARE CLINIC | Facility: CLINIC | Age: 73
End: 2018-07-16

## 2018-07-16 DIAGNOSIS — I25.10 CORONARY ARTERY DISEASE DUE TO CALCIFIED CORONARY LESION: ICD-10-CM

## 2018-07-16 DIAGNOSIS — E03.9 HYPOTHYROIDISM, UNSPECIFIED TYPE: ICD-10-CM

## 2018-07-16 DIAGNOSIS — I15.0 RENOVASCULAR HYPERTENSION: Primary | ICD-10-CM

## 2018-07-16 DIAGNOSIS — Z98.61 POST PTCA: Chronic | ICD-10-CM

## 2018-07-16 DIAGNOSIS — I25.84 CORONARY ARTERY DISEASE DUE TO CALCIFIED CORONARY LESION: ICD-10-CM

## 2018-07-16 DIAGNOSIS — I27.20 PULMONARY HYPERTENSION: ICD-10-CM

## 2018-07-16 DIAGNOSIS — I77.9 BILATERAL CAROTID ARTERY DISEASE: ICD-10-CM

## 2018-07-16 DIAGNOSIS — J81.0 FLASH PULMONARY EDEMA: ICD-10-CM

## 2018-07-16 DIAGNOSIS — I50.32 CHRONIC DIASTOLIC CONGESTIVE HEART FAILURE: ICD-10-CM

## 2018-07-16 DIAGNOSIS — I50.33 ACUTE ON CHRONIC DIASTOLIC CONGESTIVE HEART FAILURE: ICD-10-CM

## 2018-07-16 DIAGNOSIS — I70.0 AORTIC ATHEROSCLEROSIS: ICD-10-CM

## 2018-07-16 DIAGNOSIS — I51.89 LEFT VENTRICULAR DIASTOLIC DYSFUNCTION WITH PRESERVED SYSTOLIC FUNCTION: ICD-10-CM

## 2018-07-16 RX ORDER — LEVOTHYROXINE SODIUM 75 UG/1
75 TABLET ORAL
Qty: 90 TABLET | Refills: 3 | Status: SHIPPED | OUTPATIENT
Start: 2018-07-16 | End: 2019-05-07

## 2018-07-16 NOTE — TELEPHONE ENCOUNTER
Referral for cardiac rehab placed. Please fax last note,face sheet and this referral to [facility].    Thanks,  KJ

## 2018-07-16 NOTE — TELEPHONE ENCOUNTER
Refill for levothyroxine sent to [facility] pharmacy.     Is she still planning to do pill packing? Does she have enough pills for pill packing this month?    Thanks,  KJ

## 2018-07-16 NOTE — TELEPHONE ENCOUNTER
Ms Clayton is interested in Cardiac rehab at [facility]. Can we order this for her, does she have adequate documentation and diagnosis for insurance coverage for cardiac rehab?    Thanks,  KJ

## 2018-07-16 NOTE — TELEPHONE ENCOUNTER
Can you call [facility]'s cardiac rehab about what diagnosis our patient needs for [employer] or medicare to cover her cardiac rehab?    She has pulmonary hypertension but her ejection fraction is 50-55%. She does have diastolic heart failure and severe aortic stenosis.     Can you see if any of those diagnoses are sufficient or if they can fax us something on what diagnoses and documentation they need.    [address]  (Formerly [facility])  [place] [address]  [email or web address]  Tel: [phone]  Fax: [phone]

## 2018-07-17 ENCOUNTER — INFUSION (OUTPATIENT)
Dept: INFECTIOUS DISEASES | Facility: HOSPITAL | Age: 73
End: 2018-07-17
Attending: INTERNAL MEDICINE
Payer: MEDICARE

## 2018-07-17 VITALS
OXYGEN SATURATION: 99 % | SYSTOLIC BLOOD PRESSURE: 126 MMHG | RESPIRATION RATE: 18 BRPM | TEMPERATURE: 98 F | WEIGHT: 111.31 LBS | BODY MASS INDEX: 19 KG/M2 | DIASTOLIC BLOOD PRESSURE: 60 MMHG | HEART RATE: 57 BPM | HEIGHT: 64 IN

## 2018-07-17 DIAGNOSIS — D63.1 ANEMIA OF CHRONIC RENAL FAILURE, STAGE 4 (SEVERE): Primary | ICD-10-CM

## 2018-07-17 DIAGNOSIS — N18.4 ANEMIA OF CHRONIC RENAL FAILURE, STAGE 4 (SEVERE): Primary | ICD-10-CM

## 2018-07-17 PROCEDURE — 25000003 PHARM REV CODE 250: Performed by: INTERNAL MEDICINE

## 2018-07-17 PROCEDURE — 63600175 PHARM REV CODE 636 W HCPCS: Mod: JG | Performed by: INTERNAL MEDICINE

## 2018-07-17 PROCEDURE — 96365 THER/PROPH/DIAG IV INF INIT: CPT

## 2018-07-17 RX ORDER — SODIUM CHLORIDE 0.9 % (FLUSH) 0.9 %
10 SYRINGE (ML) INJECTION
Status: DISCONTINUED | OUTPATIENT
Start: 2018-07-17 | End: 2018-07-17 | Stop reason: HOSPADM

## 2018-07-17 RX ORDER — SODIUM CHLORIDE 0.9 % (FLUSH) 0.9 %
10 SYRINGE (ML) INJECTION
Status: CANCELLED | OUTPATIENT
Start: 2018-07-17

## 2018-07-17 RX ORDER — HEPARIN 100 UNIT/ML
500 SYRINGE INTRAVENOUS
Status: CANCELLED | OUTPATIENT
Start: 2018-07-17

## 2018-07-17 RX ADMIN — FERUMOXYTOL 510 MG: 510 INJECTION INTRAVENOUS at 09:07

## 2018-07-17 NOTE — PROGRESS NOTES
Last 5 Patient Entered Readings                                      Current 30 Day Average: 157/72     Recent Readings 7/17/2018 7/16/2018 7/16/2018 7/16/2018 7/15/2018    SBP (mmHg) 164 205 220 176 148    DBP (mmHg) 76 85 91 74 69    Pulse 67 72 74 64 66        Assessed elevated reading on 7/16. Encounter mostly consisted of educating patient on proper BP technique. She reports taking reading on the bed with feet hanging. She reports not knowing about proper BP technique. Informed patient of proper BP cuff technique. She states she will start taking readings at kitchen table. Unable to assess lifestyle factors. Will assess lifestyle factors upon future encounters.     Digital Medicine: Health  Follow Up    Lifestyle Modifications:    1.Dietary Modifications (Sodium intake <2,000mg/day, food labels, dining out): Deferred     2.Physical Activity: Deferred    3.Medication Therapy: Patient has been compliant with the medication regimen.    4.Patient has the following medication side effects/concerns:   (Frequency/Alleviating factors/Precipitating factors, etc.)     Follow up with Mrs. Ewelina GUAMAN Clayton completed. No further questions or concerns. Will continue follow up to achieve health goals.

## 2018-07-17 NOTE — PROGRESS NOTES
Pt arrived to infusion suite for IV feraheme.  Observed patient for 1/2 hour post infusion for possible reaction.  Pt tolerated well and left in NAD.

## 2018-07-19 ENCOUNTER — PATIENT MESSAGE (OUTPATIENT)
Dept: RHEUMATOLOGY | Facility: CLINIC | Age: 73
End: 2018-07-19

## 2018-07-19 ENCOUNTER — TELEPHONE (OUTPATIENT)
Dept: RHEUMATOLOGY | Facility: CLINIC | Age: 73
End: 2018-07-19

## 2018-07-19 NOTE — TELEPHONE ENCOUNTER
----- Message from Bertha Leonard sent at 7/19/2018  1:19 PM CDT -----  Contact: self  Patient ask for a call in regards to needing labs and urine before her scheduled appointment Patient request to add a blood count to labs if needed Patient can be reached at

## 2018-07-20 ENCOUNTER — PATIENT MESSAGE (OUTPATIENT)
Dept: RHEUMATOLOGY | Facility: CLINIC | Age: 73
End: 2018-07-20

## 2018-07-23 RX ORDER — NIFEDIPINE 60 MG/1
60 TABLET, EXTENDED RELEASE ORAL 2 TIMES DAILY
Qty: 90 TABLET | Refills: 3 | Status: ON HOLD | OUTPATIENT
Start: 2018-07-23 | End: 2018-10-11 | Stop reason: HOSPADM

## 2018-07-23 RX ORDER — ISOSORBIDE MONONITRATE 60 MG/1
60 TABLET, EXTENDED RELEASE ORAL DAILY
Qty: 90 TABLET | Refills: 3 | Status: SHIPPED | OUTPATIENT
Start: 2018-07-23 | End: 2018-12-11 | Stop reason: SDUPTHER

## 2018-07-24 ENCOUNTER — PATIENT MESSAGE (OUTPATIENT)
Dept: PRIMARY CARE CLINIC | Facility: CLINIC | Age: 73
End: 2018-07-24

## 2018-07-26 ENCOUNTER — TELEPHONE (OUTPATIENT)
Dept: INTERNAL MEDICINE | Facility: CLINIC | Age: 73
End: 2018-07-26

## 2018-07-26 NOTE — TELEPHONE ENCOUNTER
Kiesha- Hydralazine and ibesartan are not on her med list. Is she taking them in addition to the pill packs?    WENDY Scott, JOSE Johns MD              Please, advise    Previous Messages      ----- Message -----   From: Kiesha Rosario, PharmD   Sent: 7/25/2018   5:32 PM   To: Nat Canseco Staff   Subject: Medication clarification                         Patient brought in bottles of irbesartan and hydralazine but it is no longer on the patient's current med list. Should the patient still be taking these meds or should I keep them out of her pill packs? Thanks!

## 2018-07-27 ENCOUNTER — OFFICE VISIT (OUTPATIENT)
Dept: RHEUMATOLOGY | Facility: CLINIC | Age: 73
End: 2018-07-27
Payer: MEDICARE

## 2018-07-27 VITALS
HEART RATE: 57 BPM | HEIGHT: 64 IN | WEIGHT: 115 LBS | BODY MASS INDEX: 19.63 KG/M2 | SYSTOLIC BLOOD PRESSURE: 158 MMHG | DIASTOLIC BLOOD PRESSURE: 74 MMHG

## 2018-07-27 DIAGNOSIS — M71.30 SYNOVIAL CYST: Chronic | ICD-10-CM

## 2018-07-27 DIAGNOSIS — D50.9 MICROCYTIC ANEMIA: ICD-10-CM

## 2018-07-27 DIAGNOSIS — D63.8 ANEMIA OF CHRONIC ILLNESS: ICD-10-CM

## 2018-07-27 DIAGNOSIS — M32.19 OTHER SYSTEMIC LUPUS ERYTHEMATOSUS WITH OTHER ORGAN INVOLVEMENT: Primary | Chronic | ICD-10-CM

## 2018-07-27 PROCEDURE — 3077F SYST BP >= 140 MM HG: CPT | Mod: CPTII,S$GLB,, | Performed by: INTERNAL MEDICINE

## 2018-07-27 PROCEDURE — 99214 OFFICE O/P EST MOD 30 MIN: CPT | Mod: S$GLB,,, | Performed by: INTERNAL MEDICINE

## 2018-07-27 PROCEDURE — 99999 PR PBB SHADOW E&M-EST. PATIENT-LVL V: CPT | Mod: PBBFAC,,, | Performed by: INTERNAL MEDICINE

## 2018-07-27 PROCEDURE — 3078F DIAST BP <80 MM HG: CPT | Mod: CPTII,S$GLB,, | Performed by: INTERNAL MEDICINE

## 2018-07-27 ASSESSMENT — ROUTINE ASSESSMENT OF PATIENT INDEX DATA (RAPID3)
FATIGUE SCORE: 5
PSYCHOLOGICAL DISTRESS SCORE: 3.3
AM STIFFNESS SCORE: 0, NO
TOTAL RAPID3 SCORE: 5.17
PATIENT GLOBAL ASSESSMENT SCORE: 7
MDHAQ FUNCTION SCORE: 1.2
PAIN SCORE: 4.5

## 2018-07-27 NOTE — PATIENT INSTRUCTIONS
Use MOMETASONE OINTMENT on skin lesions twice a day; place bandaid on top to increase effect of ointment  Continue hydroxychloroquine for lupus which is keeping systemic disease under control  Recheck lab today to check Red blood cell count (also known as hemoglobin or hematocrit); call results  Keep next appointments with cardiology and eye doctors    Right shoulder cyst can be left alone

## 2018-07-27 NOTE — ASSESSMENT & PLAN NOTE
Has full, painless passive ROM R shoulder c/w benign synovial or ganglion cyst  No further treatment unless becomes painful in future

## 2018-07-27 NOTE — PROGRESS NOTES
"Subjective:       Patient ID: Ewelina Arnold is a 72 y.o. female.    Chief Complaint: Disease Management    HPI   Early 1990's developed acute respiratory failure and spent weeks in ICU [facility]    Positive CARYN, SSB    Hx Rash, leukopenia    On hydroxychloroquine >5 yr; saw eye MD Sept 2016     Hx:  CAD and Aortic stenosis and hx CHF; s/p PCI; ASA , rosuvastatin, carvedilol, furosemide, isosorbide, metolazone ; has app[facility] with Dr. Burdick [facility]  HTN nifedipine  Pulmonary nodule; saw Dr Logan Sept; no change  Thyroid disease on levothyroxine  CKD; she donated a kidney to her brother; is a patient of Dr Ruffin    Anemia evaluated by Dr. Pham and bone marrow c/w anemia of chronic disease but also dropped more recently and she received 2 infusions of ferraheme July 10 and 17; has not had f/u H/H       Has had new rashes that derm biopsied and said is c/w lupus; she stopped hydralazine but has still deevloped some lesions; they itch and persist in spite of steroid cream  Recently noted new swelling R shoulder  4 hospitalizations between 3/29 and 5/3 for high BP (up to 225), swelling, fluid; treated with changes in meds and give diuretics; was on clonidine that made her hallucinate; told CHF and something wrong with valve    Weight  loss; was almost 140 before got sick and recently down to 110; watches it for CHF management    Review of Systems   Constitutional: Positive for unexpected weight change. Negative for fever.   HENT: Negative for trouble swallowing.    Eyes: Negative for redness.   Respiratory: Positive for shortness of breath. Negative for cough.    Cardiovascular: Negative for chest pain.   Gastrointestinal: Negative for constipation and diarrhea.   Genitourinary: Negative for dysuria and genital sores.   Skin: Positive for rash.   Neurological: Negative for headaches.   Hematological: Bruises/bleeds easily.         Objective:   BP (!) 158/74   Pulse (!) 57   Ht 5' 4" (1.626 m)   Wt 52.2 kg " (115 lb)   LMP  (LMP Unknown) Comment: pt had taken bp meds less than an hour ago  BMI 19.74 kg/m²      Physical Exam   Constitutional: She is well-developed, well-nourished, and in no distress.   HENT:   Mouth/Throat: Oropharynx is clear and moist.   Eyes: Conjunctivae are normal.   Cardiovascular: Normal rate and regular rhythm.    Pulmonary/Chest: She has no wheezes. She has no rales.   Lymphadenopathy:     She has no cervical adenopathy.   Skin: Rash noted.     Annular SCLE lesions on back and one on R ant chest; see photos                     Lab Results   Component Value Date    SEDRATE 50 (H) 07/06/2018      Assessment:       1. Other systemic lupus erythematosus with other organ involvement    2. Anemia of chronic illness    3. Microcytic anemia    4. Synovial cyst of Right serg            Plan:       Problem List Items Addressed This Visit     Microcytic anemia     Received 2 hemeferrin infusions so will repeat CBC today         Other forms of systemic lupus erythematosus - Primary (Chronic)     Has new, persistent SCLE lesions on trunk but no other sx and no significant lab changes to indicated systemic lupus flare  Skin lesions did not improve after stopping hydralazine, and histone Ab is negative   Will cont HCQ for SLE and topical therapy for SCLE for now  Plan repeat lab in 3-4 mo to continue monitoring         Relevant Orders    CBC auto differential (Completed)    Synovial cyst of Right serg (Chronic)     Has full, painless passive ROM R shoulder c/w benign synovial or ganglion cyst  No further treatment unless becomes painful in future           Other Visit Diagnoses     Anemia of chronic illness        Relevant Orders    CBC auto differential (Completed)    Reticulocytes (Completed)    Iron and TIBC (Completed)

## 2018-07-27 NOTE — PROGRESS NOTES
Rapid3 Question Responses and Scores 7/25/2018   MDHAQ Score 1.2   Psychologic Score 3.3   Pain Score 4.5   When you awakened in the morning OVER THE LAST WEEK, did you feel stiff? No   Fatigue Score 5   Global Health Score 7   RAPID3 Score 5.16       Answers for HPI/ROS submitted by the patient on 7/25/2018   fever: No  eye redness: No  headaches: No  shortness of breath: Yes  chest pain: No  trouble swallowing: No  diarrhea: No  constipation: No  unexpected weight change: Yes  genital sore: No  dysuria: No  During the last 3 days, have you had a skin rash?: Yes  Bruises or bleeds easily: Yes  cough: No

## 2018-07-29 PROBLEM — D63.1 ANEMIA ASSOCIATED WITH CHRONIC RENAL FAILURE: Status: RESOLVED | Noted: 2017-07-25 | Resolved: 2018-07-29

## 2018-07-29 PROBLEM — J96.01 ACUTE HYPOXEMIC RESPIRATORY FAILURE: Status: RESOLVED | Noted: 2018-04-28 | Resolved: 2018-07-29

## 2018-07-29 PROBLEM — N18.9 ANEMIA ASSOCIATED WITH CHRONIC RENAL FAILURE: Status: RESOLVED | Noted: 2017-07-25 | Resolved: 2018-07-29

## 2018-07-29 PROBLEM — I16.9 HYPERTENSIVE CRISIS: Status: RESOLVED | Noted: 2018-03-19 | Resolved: 2018-07-29

## 2018-07-29 PROBLEM — N17.9 AKI (ACUTE KIDNEY INJURY): Status: RESOLVED | Noted: 2018-04-03 | Resolved: 2018-07-29

## 2018-07-29 PROBLEM — Z12.11 SCREEN FOR COLON CANCER: Status: RESOLVED | Noted: 2017-09-14 | Resolved: 2018-07-29

## 2018-07-29 PROBLEM — J81.0 FLASH PULMONARY EDEMA: Status: RESOLVED | Noted: 2018-04-12 | Resolved: 2018-07-29

## 2018-07-29 PROBLEM — E26.9 HIGH ALDOSTERONE: Status: RESOLVED | Noted: 2018-01-25 | Resolved: 2018-07-29

## 2018-07-29 PROBLEM — R79.89 ELEVATED TROPONIN: Status: RESOLVED | Noted: 2017-11-18 | Resolved: 2018-07-29

## 2018-07-29 ASSESSMENT — SYSTEMIC LUPUS ERYTHEMATOSUS DISEASE ACTIVITY INDEX (SLEDAI): TOTAL_SCORE: 2

## 2018-07-29 NOTE — ASSESSMENT & PLAN NOTE
Has new, persistent SCLE lesions on trunk but no other sx and no significant lab changes to indicated systemic lupus flare  Skin lesions did not improve after stopping hydralazine, and histone Ab is negative   Will cont HCQ for SLE and topical therapy for SCLE for now  Plan repeat lab in 3-4 mo to continue monitoring

## 2018-07-30 ENCOUNTER — DOCUMENTATION ONLY (OUTPATIENT)
Dept: PHARMACY | Facility: CLINIC | Age: 73
End: 2018-07-30

## 2018-07-30 RX ORDER — CARVEDILOL 25 MG/1
25 TABLET ORAL 2 TIMES DAILY
Qty: 180 TABLET | Refills: 3 | Status: ON HOLD | OUTPATIENT
Start: 2018-07-30 | End: 2018-10-04 | Stop reason: HOSPADM

## 2018-07-30 RX ORDER — FAMOTIDINE 20 MG/1
20 TABLET, FILM COATED ORAL DAILY
Qty: 90 TABLET | Refills: 3 | Status: SHIPPED | OUTPATIENT
Start: 2018-07-30 | End: 2019-05-07

## 2018-07-30 NOTE — TELEPHONE ENCOUNTER
Refill for coreg and pepcid sent to [facility] pharmacy.    Thanks,  KJ   Reports yellow productive cough x 1 month.   Was seen in walk in 2 weeks ago.  Here now due to pain to rght chest with cough or palpation.

## 2018-07-30 NOTE — PROGRESS NOTES
Adherence packed for 28 days:     Morning 1 card:  Asprin 81 mg  Carvedlol 25 mg  Citalopram 20 mg (1&1/2 tabs)  Famotidine 20 mg  Hydroxychloroquine 200 mg  Isosorbide Mononitrate ER 60 mg  Levothyroxine 75 mcg  Nifedipine ER Osmotic 60 mg        Evening card:  Carvedilol 25 mg  Doxazosin 1mg  Hydroxychloroquine 200 mg  Nifedipine ER Osmotic 60 mg  Rosuvastatin 10 mg       Furosemide 40mg not packed-pt to take as needed  Pt prefers to get meds from [facility].  All meds returned to patient.  Irbesartan or hydralazine 100mg d/c    35''

## 2018-07-31 ENCOUNTER — PATIENT OUTREACH (OUTPATIENT)
Dept: OTHER | Facility: OTHER | Age: 73
End: 2018-07-31

## 2018-07-31 NOTE — PROGRESS NOTES
Last 5 Patient Entered Readings                                      Current 30 Day Average: 163/73     Recent Readings 7/30/2018 7/30/2018 7/29/2018 7/29/2018 7/29/2018    SBP (mmHg) 158 168 183 216 133    DBP (mmHg) 69 72 75 91 65    Pulse 61 65 64 62 55          7/31-Patient answered requesting a phone call this afternoon. She reports being in cardiac rehab.

## 2018-07-31 NOTE — PROGRESS NOTES
"Last 5 Patient Entered Readings                                      Current 30 Day Average: 163/73     Recent Readings 7/30/2018 7/30/2018 7/29/2018 7/29/2018 7/29/2018    SBP (mmHg) 158 168 183 216 133    DBP (mmHg) 69 72 75 91 65    Pulse 61 65 64 62 55          Assessed elevated reading on 7/29. She states she may have a headache on the elevated readings. She states "when my pressure is high, I know the cuff will over inflate."  She states "im not feeling anything physically."  She states thinking the cuff is the correct size.     Patient confirms making proper changes with taking BP reading (i.e. Switching to chair and no longer sitting on bed when taking BP readings.)       Digital Medicine: Health  Follow Up    Lifestyle Modifications:    1.Dietary Modifications (Sodium intake <2,000mg/day, food labels, dining out): Patient reports increasing fruits and vegetables. Patient reports consuming oatmeal for breakfast often. Patient reports consuming baby lima beans at home yesterday. She states making beans with fresh garlic, onions, and bell peppers. Patient states eating a burger lang burger today. Patient reports consuming a [employer] grilled chicken wrap. Patient reports she may eat fast food 1x/weeks.  She states reading labels and searching for low-sodium options when purchasing groceries.       2.Physical Activity: Patient reports attending cardiac rehab 3x/week. She states walking and using machines during rehab. Encouraged patient to continue attending cardiac rehab.     3.Medication Therapy: Patient has been compliant with the medication regimen.    4.Patient has the following medication side effects/concerns:   (Frequency/Alleviating factors/Precipitating factors, etc.)     Follow up with Mrs. Ewelina Arnold completed. No further questions or concerns. Will continue follow up to achieve health goals.  "

## 2018-08-02 ENCOUNTER — TELEPHONE (OUTPATIENT)
Dept: INTERNAL MEDICINE | Facility: CLINIC | Age: 73
End: 2018-08-02

## 2018-08-02 NOTE — TELEPHONE ENCOUNTER
Misha Zaragoza,  Having you involved is an excellent resource. She has numerous barriers (including mistrust of providers and poor med compliance). I'm involved to keep her on track with pill packing and create an ambulatory network of providers that she can utilize instead of repeatedly presenting to the ED for chronic conditions.    She is intolerant of hydralazine due to it recently causing a lupus flair. What additional agent do you recommend?    Thanks,  KJ

## 2018-08-02 NOTE — TELEPHONE ENCOUNTER
----- Message from Mila Lazo, Teena sent at 8/2/2018 10:57 AM CDT -----  Good Morning Dr. Johns,    Ms. Arnold continues to have high alert readings in the Digital Medicine side. I know you wanted me to let you know when these occur but lately it's daily. I have remained hands off for medication adjustments and she is utilizing our health  for lifestyle adjustments. Her health  and myself are at a loss of what to do to help improve Ms. Chen's blood pressure. Would you prefer her to continue with our program despite our efforts? There is a way to have her continue home readings and they are entered into EPIC without our piece. Let me know your thoughts on where we should go from here. I am happy to help in anyway.     Thanks!    Mila Lazo, PharmD.  [phone]

## 2018-08-06 NOTE — TELEPHONE ENCOUNTER
Good afternoon,     Sorry for the delayed response.     After reviewing her chart, I have a few potential medication options.   1) Increasing doxazosin if she can tolerate orthostatic hypotension    2) Scheduling furosemide twice daily for hypertension    3) ACE-I contraindicated due to angioedema. Per guidelines, cross-reactivity with ARB is low. May consider irbesartan.This is first-line considering her CKD; however, I am on the fence with this due to her previous angioedema with ACE-I and medication allergies.      Thanks for allowing us to participate in her care! If I can help in any other way, let me know.     Mila Lazo, PharmD.  [phone]

## 2018-08-07 ENCOUNTER — TELEPHONE (OUTPATIENT)
Dept: INTERNAL MEDICINE | Facility: CLINIC | Age: 73
End: 2018-08-07

## 2018-08-07 NOTE — TELEPHONE ENCOUNTER
Patient called by PCP for discussion about BP. She had her cuff re-calibrated at the [facility] today. She was advised to continue checking BP per Digital HTN program until her next PCP appointment to get updated readings. At the next appointment we can talk about the other medications that may need to be started.    Thanks,  WENDY

## 2018-08-07 NOTE — PROGRESS NOTES
Assessment /Plan     For exam results, see Encounter Report.    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 Medication interaction --> resolved off clonidine    ==> 8/21/2018  Relates bilateral visual disturbances with arcs of light & darkness which pass over minutes  No HA / Syncopal    Short duration  Not typical for migraine activity        Residual Angle Closure  Stable Glaucoma & Patient near target IOP range.  Will continue with same medicines as patient is tolerating well with good adherence    s/p PI OU    Not a VF taker  Fell asleep during testing 2/28/2018  VF not cw OCT or ONH exam      CCT  535 // 537    Target mid teens    Both eyes  Xal q HS    SLE on plaquenil > 5years  [facility] chart routine with Q & A  Doing well      NSC OU  Observe      Plan  RTC 6 months for IOP & DFE OCT Mac & RNFL  RTC sooner prn with good understanding

## 2018-08-09 ENCOUNTER — TELEPHONE (OUTPATIENT)
Dept: ADMINISTRATIVE | Facility: CLINIC | Age: 73
End: 2018-08-09

## 2018-08-13 NOTE — PROGRESS NOTES
Last 5 Patient Entered Readings                                      Current 30 Day Average: 163/74     Recent Readings 8/13/2018 8/12/2018 8/11/2018 8/10/2018 8/10/2018    SBP (mmHg) 131 158 140 155 155    DBP (mmHg) 67 76 69 74 73    Pulse 61 61 52 58 58      Messaged Dr. Johns with medication recommendations for treatment of her BP. She will see the patient in clinic 8/28 and will discuss medication adjustments at this time. Dr. Johns would like the patient to remain in our program at this time.    1) Increasing doxazosin if she can tolerate orthostatic hypotension     2) Scheduling furosemide twice daily for hypertension     3) ACE-I contraindicated due to angioedema. Per guidelines, cross-reactivity with ARB is low. May consider irbesartan.This is first-line considering her CKD; however, I am on the fence with this due to her previous angioedema with ACE-I and medication allergies.

## 2018-08-14 ENCOUNTER — PATIENT OUTREACH (OUTPATIENT)
Dept: OTHER | Facility: OTHER | Age: 73
End: 2018-08-14

## 2018-08-14 ENCOUNTER — TELEPHONE (OUTPATIENT)
Dept: INTERNAL MEDICINE | Facility: CLINIC | Age: 73
End: 2018-08-14

## 2018-08-14 ENCOUNTER — OFFICE VISIT (OUTPATIENT)
Dept: INTERNAL MEDICINE | Facility: CLINIC | Age: 73
End: 2018-08-14
Payer: MEDICARE

## 2018-08-14 ENCOUNTER — HOSPITAL ENCOUNTER (OUTPATIENT)
Dept: RADIOLOGY | Facility: HOSPITAL | Age: 73
Discharge: HOME | End: 2018-08-14
Attending: INTERNAL MEDICINE
Payer: MEDICARE

## 2018-08-14 VITALS
SYSTOLIC BLOOD PRESSURE: 158 MMHG | HEART RATE: 60 BPM | TEMPERATURE: 99 F | HEIGHT: 64 IN | DIASTOLIC BLOOD PRESSURE: 64 MMHG | WEIGHT: 111.31 LBS | OXYGEN SATURATION: 99 % | BODY MASS INDEX: 19 KG/M2

## 2018-08-14 DIAGNOSIS — R22.41 MASS OF RIGHT LOWER LEG: ICD-10-CM

## 2018-08-14 DIAGNOSIS — I15.0 RENOVASCULAR HYPERTENSION: ICD-10-CM

## 2018-08-14 DIAGNOSIS — R22.41 MASS OF RIGHT LOWER LEG: Primary | ICD-10-CM

## 2018-08-14 DIAGNOSIS — M32.19 OTHER SYSTEMIC LUPUS ERYTHEMATOSUS WITH OTHER ORGAN INVOLVEMENT: Chronic | ICD-10-CM

## 2018-08-14 PROBLEM — N17.9 ACUTE KIDNEY INJURY SUPERIMPOSED ON CHRONIC KIDNEY DISEASE: Status: RESOLVED | Noted: 2018-01-21 | Resolved: 2018-08-14

## 2018-08-14 PROBLEM — N18.9 ACUTE KIDNEY INJURY SUPERIMPOSED ON CHRONIC KIDNEY DISEASE: Status: RESOLVED | Noted: 2018-01-21 | Resolved: 2018-08-14

## 2018-08-14 PROBLEM — E87.5 HYPERKALEMIA: Status: RESOLVED | Noted: 2018-04-04 | Resolved: 2018-08-14

## 2018-08-14 PROCEDURE — 76882 US LMTD JT/FCL EVL NVASC XTR: CPT | Mod: 26,,, | Performed by: RADIOLOGY

## 2018-08-14 PROCEDURE — 99999 PR PBB SHADOW E&M-EST. PATIENT-LVL V: CPT | Mod: PBBFAC,,, | Performed by: INTERNAL MEDICINE

## 2018-08-14 PROCEDURE — 3078F DIAST BP <80 MM HG: CPT | Mod: CPTII,S$GLB,, | Performed by: INTERNAL MEDICINE

## 2018-08-14 PROCEDURE — 3077F SYST BP >= 140 MM HG: CPT | Mod: CPTII,S$GLB,, | Performed by: INTERNAL MEDICINE

## 2018-08-14 PROCEDURE — 99214 OFFICE O/P EST MOD 30 MIN: CPT | Mod: S$GLB,,, | Performed by: INTERNAL MEDICINE

## 2018-08-14 PROCEDURE — 76882 US LMTD JT/FCL EVL NVASC XTR: CPT | Mod: TC

## 2018-08-14 NOTE — PROGRESS NOTES
"Subjective:       Patient ID: Ewelina Arnold is a 72 y.o. female.    Chief Complaint: Lump (Right leg)    HPI  73 y/o woman with CAD, PAD, CHF, CKD stage 4, SLE, multiple other medical conditions here for urgent visit for lump on R leg.   Patient of Dr Johns.    First noted bump on medial/anterior R lower leg about 3 weeks ago, had mild pain initially, doesn't recall if she hit her leg or had any other kind of trauma. She used ice on the area for a few days and feels that the lump decreased in size since she first noticed it.   Noted mild redness around the area last night.   No pain or itching currently. No fevers, sore throat, cough, new rash, joint swelling, or other new symptoms.    Does have h/o SLE, follows with rheumatologist.  Labs done recently show anemia at her baseline, ESR elevated at 50, normal CRP, CKD improved from baseline.     Review of Systems   Constitutional: Negative for activity change and fever.   HENT: Negative.    Eyes: Negative for visual disturbance.   Respiratory: Positive for shortness of breath (no recent change). Negative for cough.    Cardiovascular: Negative for chest pain and leg swelling.   Gastrointestinal: Negative for abdominal pain and nausea.   Genitourinary: Negative for decreased urine volume.   Musculoskeletal:        No new symptoms/recent changes other than nodule noted   Skin: Negative for color change and rash.   Neurological:        No new symptoms   Hematological: Negative for adenopathy.   Psychiatric/Behavioral: Negative for dysphoric mood.         Past medical history, surgical history, and family medical history reviewed and updated as appropriate.    Medications and allergies reviewed.     Objective:          Vitals:    08/14/18 1106   BP: (!) 158/64   BP Location: Left arm   Patient Position: Sitting   Pulse: 60   Temp: 98.5 °F (36.9 °C)   TempSrc: Oral   SpO2: 99%   Weight: 50.5 kg (111 lb 5.3 oz)   Height: 5' 4" (1.626 m)     Body mass index is 19.11 " kg/m².  Physical Exam   Constitutional: She is oriented to person, place, and time. She appears well-developed and well-nourished. No distress.   Thin woman, no acute distress, sitting comfortably   HENT:   Head: Normocephalic and atraumatic.   Mouth/Throat: Oropharynx is clear and moist.   Eyes: Conjunctivae and EOM are normal.   Neck: Neck supple.   Cardiovascular: Normal rate, regular rhythm and intact distal pulses.   Murmur heard.  Pulmonary/Chest: Effort normal and breath sounds normal. No respiratory distress.   Abdominal: Soft. There is no tenderness.   Musculoskeletal: She exhibits no edema or tenderness.   Subcutaneous cyst/nodule R lateral shoulder, chronic per patient, nontender, no erythema   Lymphadenopathy:     She has no cervical adenopathy.   Neurological: She is alert and oriented to person, place, and time. No cranial nerve deficit.   Skin: Skin is warm and dry.   2.5cm soft subcutaneous nodule, central area 5mm softer/slight fluctuance with surrounding rubbery tissue. No ulceration or punctum. Nontender. Very slight erythema.    Psychiatric: She has a normal mood and affect.   Vitals reviewed.      Lab Results   Component Value Date    WBC 3.62 (L) 07/27/2018    HGB 9.7 (L) 07/27/2018    HCT 30.5 (L) 07/27/2018     07/27/2018    CHOL 128 05/15/2018    TRIG 79 05/15/2018    HDL 48 05/15/2018    ALT 10 07/06/2018    AST 30 07/06/2018     07/06/2018    K 4.3 07/06/2018     07/06/2018    CREATININE 2.6 (H) 07/06/2018    BUN 67 (H) 07/06/2018    CO2 20 (L) 07/06/2018    TSH 1.399 05/15/2018    INR 1.0 04/28/2018    GLUF 79 12/02/2011    HGBA1C 5.7 01/24/2017       Assessment:       1. Mass of right lower leg    2. Renovascular hypertension    3. Other systemic lupus erythematosus with other organ involvement        Plan:   Ewelina was seen today for lump.    Diagnoses and all orders for this visit:    Mass of right lower leg - given time course and exam findings, doesn't appear  consistent with cellulitis or abscess. Could be subcutaneous cyst vs   fibroadenoma, vs erythema nodosum (but nontender, so not typical).   Reviewed labs - does have h/o autoimmune disease which would make EN more likely.   Discussed options for further evaluation -- will order ultrasound but will also forward note to PCP and to patient's rheumatologist for their input.  Return precautions given to patient  -     US Soft Tissue Misc; Future    Renovascular hypertension - above goal, has recent messages regarding potentially adding another BP medication with PCP -- will forward note and defer to PCP on this given her multiple medical problems    Other systemic lupus erythematosus with other organ involvement - labs and rheumatology note reviewed  No new rash, joint pain, or other new symptoms    Health maintenance deferred to PCP.    Follow-up if symptoms worsen or fail to improve.    Jimmy Gupta MD  Internal Medicine  [facility]  8/14/2018

## 2018-08-14 NOTE — PROGRESS NOTES
Last 5 Patient Entered Readings                                      Current 30 Day Average: 162/74     Recent Readings 8/14/2018 8/13/2018 8/12/2018 8/11/2018 8/10/2018    SBP (mmHg) 155 131 158 140 155    DBP (mmHg) 72 67 76 69 74    Pulse 60 61 61 52 58          8/14-Patient was in DRS office. She states she will return call when available.

## 2018-08-14 NOTE — Clinical Note
I saw Ewelina Arnold today for urgent visit for nodule on lower leg - see note for details, please contact me if any questions. Doesn't appear to be infectious in origin, could be erythema nodosum vs cyst or fibroadenoma. Has only very mild erythema and no tenderness, so not entirely typical of EN. I've ordered an ultrasound but given her other medical issues recommended that she also follow up with both of you on this. Is there anything else you feel would be useful for further evaluation? I reviewed her labs from July; given that these were done recently I wanted to check in with you both before ordering more labs.Thanks, Jimmy Gupta MD

## 2018-08-14 NOTE — PATIENT INSTRUCTIONS
If the nodule gets bigger, more red, or becomes painful, or if you develop any other new symptoms (sore throat, new rash, fever, nausea) please call in to the clinic to check in.    I am ordering an ultrasound to further evaluate the nodule, but I will also check in with Dr Johns to see if there is anything else she recommends.

## 2018-08-14 NOTE — TELEPHONE ENCOUNTER
Please check on patient, she had an urgent appointment with Dr Pacheco today. She has a new mass on her leg.    We'll see what the ultrasound says, and can treat from there.    She has follow-up with us at the end of the month.     Thanks,  KJ

## 2018-08-14 NOTE — PROGRESS NOTES
Last 5 Patient Entered Readings                                      Current 30 Day Average: 162/74     Recent Readings 8/14/2018 8/13/2018 8/12/2018 8/11/2018 8/10/2018    SBP (mmHg) 155 131 158 140 155    DBP (mmHg) 72 67 76 69 74    Pulse 60 61 61 52 58        8/14-Patient LVM on HC phone requesting HC to review DRS appointment as reference to her current health status. Will call in 1 week.

## 2018-08-16 ENCOUNTER — TELEPHONE (OUTPATIENT)
Dept: INTERNAL MEDICINE | Facility: CLINIC | Age: 73
End: 2018-08-16

## 2018-08-16 ENCOUNTER — PATIENT MESSAGE (OUTPATIENT)
Dept: RHEUMATOLOGY | Facility: CLINIC | Age: 73
End: 2018-08-16

## 2018-08-16 NOTE — TELEPHONE ENCOUNTER
PCP informed patient that the mass on her leg is likely an inflammatory reaction related to her lupus or possibly erythema nodosum. She is willing to start the steroids (and postpone the MRI unless she doesn't improve on steroids). She would prefer for Dr Ramirez to recommend the steroid schedule.     Dr Ramirez- From what I read, prednisone 20mg for 7-10 days. I can write the script if you agree.    I also spoke with patient about her digital HTN BP readings being elevated, but her BP readings at cardiac rehab being controlled. It is likely that her digital HTN cuff is too large. Patient returned the cuff today. At her next PCP appointment on 8/28 she will have her arms measured to see if she needs a pediatric cuff.    Thanks,  WENDY

## 2018-08-16 NOTE — PROGRESS NOTES
Last 5 Patient Entered Readings                                      Current 30 Day Average: 161/74     Recent Readings 8/16/2018 8/16/2018 8/16/2018 8/15/2018 8/15/2018    SBP (mmHg) 145 141 150 171 127    DBP (mmHg) 74 66 69 72 63    Pulse 57 55 60 62 61          8/16-Patient asked about staying in program, and, methods she can pursue to how she can stay in our program. Asked about using an alternative cuff to ours and manually input BP readings from said cuff. Informed patient we require our patients to use our cuff but HC would consult clinical pharmacist to further discuss other measures. Clinical pharmacist is currently addressing issue with PCP.

## 2018-08-17 ENCOUNTER — TELEPHONE (OUTPATIENT)
Dept: PRIMARY CARE CLINIC | Facility: CLINIC | Age: 73
End: 2018-08-17

## 2018-08-17 ENCOUNTER — OFFICE VISIT (OUTPATIENT)
Dept: RHEUMATOLOGY | Facility: CLINIC | Age: 73
End: 2018-08-17
Payer: MEDICARE

## 2018-08-17 VITALS
SYSTOLIC BLOOD PRESSURE: 111 MMHG | HEART RATE: 56 BPM | DIASTOLIC BLOOD PRESSURE: 54 MMHG | BODY MASS INDEX: 19.24 KG/M2 | WEIGHT: 112.69 LBS | HEIGHT: 64 IN

## 2018-08-17 DIAGNOSIS — L52 ERYTHEMA NODOSUM: Primary | ICD-10-CM

## 2018-08-17 DIAGNOSIS — L29.9 SCALP PRURITUS: ICD-10-CM

## 2018-08-17 PROCEDURE — 3074F SYST BP LT 130 MM HG: CPT | Mod: CPTII,S$GLB,, | Performed by: INTERNAL MEDICINE

## 2018-08-17 PROCEDURE — 99213 OFFICE O/P EST LOW 20 MIN: CPT | Mod: S$GLB,,, | Performed by: INTERNAL MEDICINE

## 2018-08-17 PROCEDURE — 3078F DIAST BP <80 MM HG: CPT | Mod: CPTII,S$GLB,, | Performed by: INTERNAL MEDICINE

## 2018-08-17 PROCEDURE — 99999 PR PBB SHADOW E&M-EST. PATIENT-LVL IV: CPT | Mod: PBBFAC,,, | Performed by: INTERNAL MEDICINE

## 2018-08-17 RX ORDER — CLOBETASOL PROPIONATE 0.46 MG/ML
SOLUTION TOPICAL 2 TIMES DAILY
Qty: 50 ML | Refills: 2 | Status: SHIPPED | OUTPATIENT
Start: 2018-08-17

## 2018-08-17 RX ORDER — PREDNISONE 20 MG/1
20 TABLET ORAL DAILY
Qty: 14 TABLET | Refills: 0 | Status: SHIPPED | OUTPATIENT
Start: 2018-08-17 | End: 2018-08-28

## 2018-08-17 ASSESSMENT — ROUTINE ASSESSMENT OF PATIENT INDEX DATA (RAPID3)
MDHAQ FUNCTION SCORE: .8
AM STIFFNESS SCORE: 1, YES
PATIENT GLOBAL ASSESSMENT SCORE: 7
PSYCHOLOGICAL DISTRESS SCORE: 3.3
FATIGUE SCORE: 3.5
PAIN SCORE: 3.5
TOTAL RAPID3 SCORE: 4.39
WHEN YOU AWAKENED IN THE MORNING OVER THE LAST WEEK, PLEASE INDICATE THE AMOUNT OF TIME IT TAKES UNTIL YOU ARE AS LIMBER AS YOU WILL BE FOR THE DAY: 5 MIN

## 2018-08-17 NOTE — TELEPHONE ENCOUNTER
----- Message from Mila Lazo, PharmD sent at 8/16/2018  4:58 PM CDT -----  Good evening Dr. Johns,    We received word earlier today from the [facility] that Ms. Arnold had her cuff changed minimally four times due to a 20+ difference in cuff readings that she is receiving and what is being manually taken in clinic. She has the smallest cuff possible therefore it was informed that she will no longer be able to participate in our program. I wanted to let you know since we have discussed her case thoroughly and will inform the patient tomorrow. I am sorry this is the way this is ending with her.    Let me know if you have any questions.    Take care!    Mila Lazo, PharmD.  [phone]

## 2018-08-17 NOTE — PROGRESS NOTES
"Subjective:       Patient ID: Ewelina Arnold is a 72 y.o. female.    Chief Complaint: Disease Management    Early 1990's developed acute respiratory failure and spent weeks in ICU [facility]    Positive CARYN, SSB    Hx Rash, leukopenia    On hydroxychloroquine >5 yr; saw eye MD Sept 2016 June 2018 developed new DLE lesions    Hx:  CAD and Aortic stenosis and hx CHF; s/p PCI; ASA , rosuvastatin, carvedilol, furosemide, isosorbide, metolazone ; has appt with Dr. Burdick (unhappy with Qauddin)  HTN nifedipine  Pulmonary nodule; saw Dr Logan Sept; no change  Thyroid disease on levothyroxine  CKD; she donated a kidney to her brother; is a patient of Dr Ruffin  Anemia evaluated by Dr. Pham and bone marrow c/w anemia of chronic disease     Now has a red nodule on RLE tibia that looks like inflammatory lesion but is single and non-tender    Review of Systems      Objective:   BP (!) 111/54   Pulse (!) 56   Ht 5' 4" (1.626 m)   Wt 51.1 kg (112 lb 11.2 oz)   LMP  (LMP Unknown) Comment: pt had taken bp meds less than an hour ago  BMI 19.34 kg/m²      Physical Exam      7x5 cm area of erythema with small nodule at center, soft and not tender              Assessment:       1. Erythema nodosum    2. Scalp pruritus        Single red nodule is unusual for e nodosum that usually occurs as multiple lesions and is tender but she has smoldering lupus and perhaps this is an inflammatory mass that will respond to steroids    Plan:       Problem List Items Addressed This Visit     None      Visit Diagnoses     Erythema nodosum    -  Primary    Relevant Medications    predniSONE (DELTASONE) 20 MG tablet    Scalp pruritus        Relevant Medications    clobetasol (TEMOVATE) 0.05 % external solution          Will treat with empiric trial of prednisone 20 mg/d for 2 weeks  If lesion does not respond then she should go to surgery for I&D or excision  WD  "

## 2018-08-17 NOTE — TELEPHONE ENCOUNTER
Mila- Not a problem, I will measure her arm at her next appointment and see if we can switch to a pediatric cuff.    She is at goal, which is all we needed!    WENDY David

## 2018-08-20 ENCOUNTER — TELEPHONE (OUTPATIENT)
Dept: PRIMARY CARE CLINIC | Facility: CLINIC | Age: 73
End: 2018-08-20

## 2018-08-21 ENCOUNTER — HOSPITAL ENCOUNTER (OUTPATIENT)
Dept: CARDIOLOGY | Facility: CLINIC | Age: 73
Discharge: HOME | End: 2018-08-21
Attending: INTERNAL MEDICINE
Payer: MEDICARE

## 2018-08-21 ENCOUNTER — OFFICE VISIT (OUTPATIENT)
Dept: OPHTHALMOLOGY | Facility: CLINIC | Age: 73
End: 2018-08-21
Payer: MEDICARE

## 2018-08-21 ENCOUNTER — TELEPHONE (OUTPATIENT)
Dept: PULMONOLOGY | Facility: CLINIC | Age: 73
End: 2018-08-21

## 2018-08-21 DIAGNOSIS — H59.099 POSITIVE DYSPHOTOPSIA: ICD-10-CM

## 2018-08-21 DIAGNOSIS — Z79.899 HIGH RISK MEDICATION USE: ICD-10-CM

## 2018-08-21 DIAGNOSIS — H40.243 RESIDUAL STAGE OF ANGLE-CLOSURE GLAUCOMA OF BOTH EYES: Primary | ICD-10-CM

## 2018-08-21 DIAGNOSIS — I35.0 NONRHEUMATIC AORTIC VALVE STENOSIS: ICD-10-CM

## 2018-08-21 DIAGNOSIS — H04.123 INSUFFICIENCY OF TEAR FILM OF BOTH EYES: ICD-10-CM

## 2018-08-21 DIAGNOSIS — H53.433 ARCUATE SCOTOMA OF BOTH EYES: ICD-10-CM

## 2018-08-21 DIAGNOSIS — H25.13 NUCLEAR SCLEROSIS OF BOTH EYES: ICD-10-CM

## 2018-08-21 DIAGNOSIS — H53.8 POSITIVE DYSPHOTOPSIA: ICD-10-CM

## 2018-08-21 LAB
AORTIC VALVE REGURGITATION: ABNORMAL
AORTIC VALVE STENOSIS: ABNORMAL
DIASTOLIC DYSFUNCTION: YES
ESTIMATED PA SYSTOLIC PRESSURE: 38.05
GLOBAL PERICARDIAL EFFUSION: ABNORMAL
MITRAL VALVE REGURGITATION: ABNORMAL
RETIRED EF AND QEF - SEE NOTES: 60 (ref 55–65)
TRICUSPID VALVE REGURGITATION: ABNORMAL

## 2018-08-21 PROCEDURE — 99999 PR PBB SHADOW E&M-EST. PATIENT-LVL II: CPT | Mod: PBBFAC,,, | Performed by: OPHTHALMOLOGY

## 2018-08-21 PROCEDURE — 93306 TTE W/DOPPLER COMPLETE: CPT | Mod: S$GLB,,, | Performed by: INTERNAL MEDICINE

## 2018-08-21 PROCEDURE — 92012 INTRM OPH EXAM EST PATIENT: CPT | Mod: S$GLB,,, | Performed by: OPHTHALMOLOGY

## 2018-08-21 PROCEDURE — 92133 CPTRZD OPH DX IMG PST SGM ON: CPT | Mod: S$GLB,,, | Performed by: OPHTHALMOLOGY

## 2018-08-21 NOTE — Clinical Note
Saw Dr Collazo / neuro-ophthalmology 12/2017Not ocular originVisual Hallucinations --> apparent during awaking at night // lasting 3-5 seconds --> scares patientPotted plants / trees at night? Medication interaction --> resolved off clonidine==> 8/21/2018Relates bilateral visual disturbances with arcs of light & darkness which pass aver minutesNo HA / SyncopalShort durationNot typical for migraine activity? Vascular / Med SE ?

## 2018-08-21 NOTE — PROGRESS NOTES
Last 5 Patient Entered Readings                                      Current 30 Day Average: 160/74     Recent Readings 8/16/2018 8/16/2018 8/16/2018 8/15/2018 8/15/2018    SBP (mmHg) 145 141 150 171 127    DBP (mmHg) 74 66 69 72 63    Pulse 57 55 60 62 61        8/21-Clinical pharmacist awaiting response from PCP. Pushing call until PCP response.

## 2018-08-23 ENCOUNTER — TELEPHONE (OUTPATIENT)
Dept: INTERNAL MEDICINE | Facility: CLINIC | Age: 73
End: 2018-08-23

## 2018-08-23 NOTE — TELEPHONE ENCOUNTER
Please see how the patient's leg lesions look, have they resolved yet?    Is she still taking her diuretics or metolazone?    Thanks,  KJ

## 2018-08-24 NOTE — TELEPHONE ENCOUNTER
She should keep taking her prednisone.    She should also take her lasix and metolazone (in the way her heart failure doctor advised her to take it) to help with the fluid in her legs.    Thanks,  KJ

## 2018-08-24 NOTE — TELEPHONE ENCOUNTER
Pt says redness is gone but the lump is still there.  Pt wants to know if she needs to continue taking the prednisone (deltasone) 20mg.

## 2018-08-27 ENCOUNTER — DOCUMENTATION ONLY (OUTPATIENT)
Dept: PHARMACY | Facility: CLINIC | Age: 73
End: 2018-08-27

## 2018-08-27 RX ORDER — CITALOPRAM 20 MG/1
TABLET, FILM COATED ORAL
Qty: 135 TABLET | Refills: 1 | Status: SHIPPED | OUTPATIENT
Start: 2018-08-27 | End: 2019-01-02 | Stop reason: SDUPTHER

## 2018-08-27 NOTE — PROGRESS NOTES
Adherence packed for 28 days:     Morning card:  Asprin 81 mg  Carvedlol 25 mg  Citalopram 20 mg (1&1/2 tabs)  Famotidine 20 mg  Hydroxychloroquine 200 mg  Isosorbide Mononitrate ER 60 mg  Levothyroxine 75 mcg  Nifedipine ER Osmotic 60 mg        Evening card:  Carvedilol 25 mg  Doxazosin 1mg  Hydroxychloroquine 200 mg  Nifedipine ER Osmotic 60 mg  Rosuvastatin 10 mg        Furosemide 40mg not packed-pt to take as needed  Pt prefers to get meds from [facility] and brings it to pharmacy.  Irbesartan or hydralazine 100mg d/c     35''

## 2018-08-28 ENCOUNTER — OFFICE VISIT (OUTPATIENT)
Dept: PRIMARY CARE CLINIC | Facility: CLINIC | Age: 73
End: 2018-08-28
Payer: MEDICARE

## 2018-08-28 ENCOUNTER — LAB VISIT (OUTPATIENT)
Dept: LAB | Facility: HOSPITAL | Age: 73
End: 2018-08-28
Attending: INTERNAL MEDICINE
Payer: MEDICARE

## 2018-08-28 VITALS
BODY MASS INDEX: 21.15 KG/M2 | OXYGEN SATURATION: 98 % | HEIGHT: 64 IN | SYSTOLIC BLOOD PRESSURE: 146 MMHG | HEART RATE: 53 BPM | DIASTOLIC BLOOD PRESSURE: 62 MMHG | WEIGHT: 123.88 LBS

## 2018-08-28 DIAGNOSIS — H53.9 VISION CHANGES: ICD-10-CM

## 2018-08-28 DIAGNOSIS — E03.9 ACQUIRED HYPOTHYROIDISM: ICD-10-CM

## 2018-08-28 DIAGNOSIS — D63.1 ANEMIA OF CHRONIC RENAL FAILURE, STAGE 4 (SEVERE): ICD-10-CM

## 2018-08-28 DIAGNOSIS — I15.0 RENOVASCULAR HYPERTENSION: ICD-10-CM

## 2018-08-28 DIAGNOSIS — I50.33 ACUTE ON CHRONIC DIASTOLIC CONGESTIVE HEART FAILURE: ICD-10-CM

## 2018-08-28 DIAGNOSIS — M32.19 OTHER SYSTEMIC LUPUS ERYTHEMATOSUS WITH OTHER ORGAN INVOLVEMENT: Chronic | ICD-10-CM

## 2018-08-28 DIAGNOSIS — I35.0 NONRHEUMATIC AORTIC VALVE STENOSIS: ICD-10-CM

## 2018-08-28 DIAGNOSIS — N18.4 ANEMIA OF CHRONIC RENAL FAILURE, STAGE 4 (SEVERE): ICD-10-CM

## 2018-08-28 DIAGNOSIS — I1A.0 RESISTANT HYPERTENSION: ICD-10-CM

## 2018-08-28 LAB
25(OH)D3+25(OH)D2 SERPL-MCNC: 19 NG/ML
ALBUMIN SERPL BCP-MCNC: 3 G/DL
ALP SERPL-CCNC: 54 U/L
ALT SERPL W/O P-5'-P-CCNC: 19 U/L
ANION GAP SERPL CALC-SCNC: 10 MMOL/L
AST SERPL-CCNC: 34 U/L
BASOPHILS # BLD AUTO: 0 K/UL
BASOPHILS NFR BLD: 0 %
BILIRUB SERPL-MCNC: 0.2 MG/DL
BUN SERPL-MCNC: 110 MG/DL
CALCIUM SERPL-MCNC: 8.7 MG/DL
CHLORIDE SERPL-SCNC: 90 MMOL/L
CHOLEST SERPL-MCNC: 181 MG/DL
CHOLEST/HDLC SERPL: 2.7 {RATIO}
CO2 SERPL-SCNC: 25 MMOL/L
CREAT SERPL-MCNC: 3.8 MG/DL
DIFFERENTIAL METHOD: ABNORMAL
EOSINOPHIL # BLD AUTO: 0 K/UL
EOSINOPHIL NFR BLD: 0 %
ERYTHROCYTE [DISTWIDTH] IN BLOOD BY AUTOMATED COUNT: 14.3 %
EST. GFR  (AFRICAN AMERICAN): 13 ML/MIN/1.73 M^2
EST. GFR  (NON AFRICAN AMERICAN): 11 ML/MIN/1.73 M^2
GLUCOSE SERPL-MCNC: 96 MG/DL
HCT VFR BLD AUTO: 32.2 %
HDLC SERPL-MCNC: 66 MG/DL
HDLC SERPL: 36.5 %
HGB BLD-MCNC: 10.8 G/DL
LDLC SERPL CALC-MCNC: 104.2 MG/DL
LYMPHOCYTES # BLD AUTO: 0.7 K/UL
LYMPHOCYTES NFR BLD: 10.7 %
MAGNESIUM SERPL-MCNC: 2.1 MG/DL
MCH RBC QN AUTO: 26.9 PG
MCHC RBC AUTO-ENTMCNC: 33.5 G/DL
MCV RBC AUTO: 80 FL
MONOCYTES # BLD AUTO: 0.3 K/UL
MONOCYTES NFR BLD: 4.6 %
NEUTROPHILS # BLD AUTO: 5.5 K/UL
NEUTROPHILS NFR BLD: 84.5 %
NONHDLC SERPL-MCNC: 115 MG/DL
PLATELET # BLD AUTO: 266 K/UL
PMV BLD AUTO: 9.7 FL
POTASSIUM SERPL-SCNC: 3.8 MMOL/L
PROT SERPL-MCNC: 6.4 G/DL
RBC # BLD AUTO: 4.02 M/UL
SODIUM SERPL-SCNC: 125 MMOL/L
TRIGL SERPL-MCNC: 54 MG/DL
TSH SERPL DL<=0.005 MIU/L-ACNC: 0.78 UIU/ML
WBC # BLD AUTO: 6.53 K/UL

## 2018-08-28 PROCEDURE — 82306 VITAMIN D 25 HYDROXY: CPT

## 2018-08-28 PROCEDURE — 80053 COMPREHEN METABOLIC PANEL: CPT

## 2018-08-28 PROCEDURE — 83735 ASSAY OF MAGNESIUM: CPT

## 2018-08-28 PROCEDURE — 3077F SYST BP >= 140 MM HG: CPT | Mod: CPTII,S$GLB,, | Performed by: INTERNAL MEDICINE

## 2018-08-28 PROCEDURE — 99215 OFFICE O/P EST HI 40 MIN: CPT | Mod: S$GLB,,, | Performed by: INTERNAL MEDICINE

## 2018-08-28 PROCEDURE — 36415 COLL VENOUS BLD VENIPUNCTURE: CPT

## 2018-08-28 PROCEDURE — 84443 ASSAY THYROID STIM HORMONE: CPT

## 2018-08-28 PROCEDURE — 3078F DIAST BP <80 MM HG: CPT | Mod: CPTII,S$GLB,, | Performed by: INTERNAL MEDICINE

## 2018-08-28 PROCEDURE — 85025 COMPLETE CBC W/AUTO DIFF WBC: CPT

## 2018-08-28 PROCEDURE — 80061 LIPID PANEL: CPT

## 2018-08-28 NOTE — ASSESSMENT & PLAN NOTE
Newly compliant with BP and CHF meds  · Discharged from digital HTN program  · PCP to manage BP  · Pediatric BP cuff  · Lasix BID  · Continue coreg 25mg BID, cardura, isosorbide mononitrate 60mg, nifedipine 60mg BID

## 2018-08-28 NOTE — PROGRESS NOTES
"Primary Care Provider Appointment    Subjective:      Patient ID: Ewelina Arnold is a 72 y.o. female with CHF, aortic stenosis, lupus flair, HTN, new onset hallucinations    Chief Complaint: Follow-up; Rash; and Congestive Heart Failure    Patient with history of frequent hospital admits before admission to this clinic, has not had any hospital admits since admission to this clinic and appreciates better care coordination.    Patient recently started on prednisone for erythema nodusum (lupus flair from hydralazine use). LE nodule resolved, but BP low and weight increased (home logs scanned into chart). She is taking 40mg lasix with metolazone daily sometimes BID.    BP elevated today. She took her last dose of steroids this morning.     She is participating in cardiac rehab at [facility], has individualized exercise program with close monitoring.     She had iron infusions with Nephrology in 7/2018. She is due for monitoring PTH, Vit D. May be started on erythropoetin. Due for follow-up with Dr Epperson.    She is having visual hallucinations as follow, per Dr Lenz's note. These previously resolved after weaningn clonidine, but returned aroudn the time the steroids were started.  "Saw Dr Collazo / neuro-ophthalmology 12/2017   Not ocular origin   Visual Hallucinations --> apparent during awaking at night // lasting 3-5 seconds --> scares patient   Potted plants / trees at night   ? Medication interaction --> resolved off clonidine   ==> 8/21/2018   Relates bilateral visual disturbances with arcs of light & darkness which pass aver minutes   No HAIR / Syncopal   Short duration   Not typical for migraine activity   ? Vascular / Med SE ?"     Past Surgical History:   Procedure Laterality Date    CARDIAC CATHETERIZATION  07/27/2011    x1    CHOLECYSTECTOMY  1999    COLON SURGERY  2000 & 2003    partial removal    EYE SURGERY      HAND SURGERY Bilateral 1996 & 1997    due to carpal tunnel     HERNIA REPAIR   " "   HYSTERECTOMY  1983    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    OOPHORECTOMY      removed one    Peripheral Iridotomy both eyes  1994    laser angle correction    THYROIDECTOMY, PARTIAL  2006    to remove two nodules and one-half thyroid       Past Medical History:   Diagnosis Date    Acute hypoxemic respiratory failure 4/28/2018    Acute on chronic diastolic congestive heart failure 11/17/2017    Allergy     Anatomical narrow angle glaucoma     Anemia     States diagnosed about a month ago    Aortic atherosclerosis     Arthritis     Cataract     CHF (congestive heart failure)     Chronic kidney disease     Chronic sciatica of left side     Right lower back pain due to sciatica    Coronary artery disease     Depression     Dry eyes     GERD (gastroesophageal reflux disease)     History of colon cancer     Hyperaldosteronism     Hyperlipidemia     Hypertension     Hypothyroidism     Keloid cicatrix     Left ventricular diastolic dysfunction with preserved systolic function     Lupus (systemic lupus erythematosus) 8/17/2012    Malnutrition 5/16/2017    Osteoarthritis of cervical spine 8/17/2012    Osteopenia     PAD (peripheral artery disease)     FRAN (renal artery stenosis)        Review of Systems   Constitutional: Negative for activity change and appetite change.   Respiratory: Negative for cough and shortness of breath.    Cardiovascular: Negative for leg swelling.   Genitourinary: Negative for difficulty urinating and dysuria.   Skin: Positive for wound.   Neurological: Positive for dizziness. Negative for light-headedness.   Hematological: Bruises/bleeds easily.   Psychiatric/Behavioral: Positive for agitation. Negative for behavioral problems, confusion, decreased concentration and dysphoric mood.       Objective:   BP (!) 146/62 (BP Location: Right arm, Patient Position: Sitting, BP Method: Small (Manual))   Pulse (!) 53   Ht 5' 4" (1.626 m)   Wt 56.2 kg (123 lb 14.4 oz) "   LMP  (LMP Unknown) Comment: pt had taken bp meds less than an hour ago  SpO2 98%   BMI 21.27 kg/m²     Physical Exam   Constitutional: She is oriented to person, place, and time. She appears well-developed and well-nourished.   HENT:   Head: Normocephalic and atraumatic.   Decreased sensation in maxillary skin   Neck: Normal range of motion.   Musculoskeletal: She exhibits deformity. She exhibits no edema.   Deformity of R shoulder, no TTP  Resolution of LLE nodule   Neurological: She is alert and oriented to person, place, and time.   Skin:   Ecchymoses and purpura on UE bilaterally  Decreased sensation in maxillary skin   Psychiatric: She has a normal mood and affect. Her behavior is normal. Thought content normal.   Vitals reviewed.      Lab Results   Component Value Date    WBC 3.62 (L) 07/27/2018    HGB 9.7 (L) 07/27/2018    HCT 30.5 (L) 07/27/2018     07/27/2018    CHOL 128 05/15/2018    TRIG 79 05/15/2018    HDL 48 05/15/2018    ALT 10 07/06/2018    AST 30 07/06/2018     07/06/2018    K 4.3 07/06/2018     07/06/2018    CREATININE 2.6 (H) 07/06/2018    BUN 67 (H) 07/06/2018    CO2 20 (L) 07/06/2018    TSH 1.399 05/15/2018    INR 1.0 04/28/2018    GLUF 79 12/02/2011    HGBA1C 5.7 01/24/2017                     Assessment:   72 y.o. female with multiple co-morbid illnesses here to continue work-up of chronic issues notably CHF, aortic stenosis, lupus flair, HTN, new onset hallucinations    Plan:     Problem List Items Addressed This Visit        Ophtho    Vision changes     Hallucinations, not ocular in origin (seen by ophthalmology), is on steroids for lupus flair  · Refer to Vascular Neurology  · Monitor and wean off steroids         Relevant Orders    Ambulatory Referral to Vascular Neurology    TSH       Cardiac/Vascular    Resistant hypertension     Newly compliant with BP and CHF meds  · Advised to purchase pediatric cuff  · PCP to manage BP  · Lasix BID  · Continue coreg 25mg BID,  isosorbide mononitrate 60mg, nifedipine 60mg BID         Nonrheumatic aortic valve stenosis     Aortic regurg and stenosis on recent echo, not eligible for TAVR (will not improve aortic valve function)  · F/U heart failure  · Appts with Dr Isabel to eval for TAVR  · Appt with Dr Burdick for heart failure         Renovascular hypertension     Newly compliant with BP and CHF meds  · Discharged from digital HTN program  · PCP to manage BP  · Pediatric BP cuff  · Lasix BID  · Continue coreg 25mg BID, cardura, isosorbide mononitrate 60mg, nifedipine 60mg BID         Acute on chronic diastolic congestive heart failure     Grade 2 diastolic failure, systolic HF, aortic stenosis  · Increase lasix to 40mg BID with metolazone  · F/U interventional and heart failure         Relevant Orders    Comprehensive metabolic panel    Lipid panel    Magnesium       Immunology/Multi System    Other forms of systemic lupus erythematosus (Chronic)     Diagnosed in 1990s, pos CARYN, SSB. Biopsy showed cutaneous involvment  · Continue plaquenil  · Completed prednisone for erythema nodosum  · F/U Rheum  · PCP messaged Dr Ramirez about stopping daily prednisone         Relevant Orders    Ambulatory Referral to Vascular Neurology    CBC auto differential       Oncology    Anemia of chronic renal failure, stage 4 (severe)     Followed by Nephrology, PRN iron infusions  · F/U Nephrology   PCP messaged Dr Epperson for appt   · Iron infusion PRN  · May need erythropoetin         Relevant Orders    CBC auto differential    TSH    Vitamin D       Endocrine    Acquired hypothyroidism     TSH controlled on supplement  · monitor         Relevant Orders    TSH          Health Maintenance       Date Due Completion Date    High Dose Statin 12/29/1966 ---LABS TODAY    Influenza Vaccine 08/01/2018 10/10/2017    Override on 10/9/2015: Done    DEXA SCAN 01/05/2019 1/5/2016    Mammogram 08/23/2019 8/23/2017    Colonoscopy 09/14/2022 9/14/2017    Lipid Panel 05/15/2023  5/15/2018    TETANUS VACCINE 06/29/2026 6/29/2016          Follow-up in about 2 months (around 10/28/2018). . One hour spent with this patient today, half of that in counseling.    Amarilys Johns MD/MPH  Internal Medicine  [facility]  [phone]

## 2018-08-28 NOTE — Clinical Note
Misha Epperson,Ms Clayton is due for follow-up. She had 2 iron infusions in mid-July.She also had a lupus flair this month and completed a 14 d course of prednisone.Thanks,WENDY

## 2018-08-28 NOTE — ASSESSMENT & PLAN NOTE
Aortic regurg and stenosis on recent echo, not eligible for TAVR (will not improve aortic valve function)  · F/U heart failure  · Appts with Dr Isabel to eval for TAVR  · Appt with Dr Burdick for heart failure

## 2018-08-28 NOTE — ASSESSMENT & PLAN NOTE
Newly compliant with BP and CHF meds  · Advised to purchase pediatric cuff  · PCP to manage BP  · Lasix BID  · Continue coreg 25mg BID, isosorbide mononitrate 60mg, nifedipine 60mg BID

## 2018-08-28 NOTE — Clinical Note
Hi Dr Ramirez,The LE nodule is improved on steroids, but she is experiencing weight gain and HTN in the setting of heart failure. I advised her to end her steroid course today (she only has 2 tablets left). I will monitor for recurrence.When do you want to see her again in clinic?Thanks,WENDY

## 2018-08-28 NOTE — ASSESSMENT & PLAN NOTE
Diagnosed in 1990s, pos CARYN, SSB. Biopsy showed cutaneous involvment  · Continue plaquenil  · Completed prednisone for erythema nodosum  · F/U Rheum  · PCP messaged Dr Ramirez about stopping daily prednisone

## 2018-08-28 NOTE — ASSESSMENT & PLAN NOTE
Hallucinations, not ocular in origin (seen by ophthalmology), is on steroids for lupus flair  · Refer to Vascular Neurology  · Monitor and wean off steroids

## 2018-08-28 NOTE — PATIENT INSTRUCTIONS
TODAY:  - see Vascular Neurology for vision changes (Dr James)  - purchase pediatric blood cuff (18-26cm, your arm is 22cm)  - stop steroids today (completed 12 d course for leg)  - PCP messaged Dr Ramirez about steroids  - See Dr Isabel about aortic stenosis  - increase lasix twice daily for next 2-3 days  - call me whenever you get a few readings on the pediatric BP cuff and with weights at the end of the week   + Ms Eng [phone]  - PCP to message Dr Epperson (Nephrology) for follow-up

## 2018-08-28 NOTE — ASSESSMENT & PLAN NOTE
Followed by Nephrology, PRN iron infusions  · F/U Nephrology   PCP messaged Dr Epperson for appt   · Iron infusion PRN  · May need erythropoetin

## 2018-08-28 NOTE — ASSESSMENT & PLAN NOTE
Grade 2 diastolic failure, systolic HF, aortic stenosis  · Increase lasix to 40mg BID with metolazone  · F/U interventional and heart failure

## 2018-08-30 NOTE — PROGRESS NOTES
Last 5 Patient Entered Readings                                      Current 30 Day Average: 161/75     Recent Readings 8/16/2018 8/16/2018 8/16/2018 8/15/2018 8/15/2018    SBP (mmHg) 145 141 150 171 127    DBP (mmHg) 74 66 69 72 63    Pulse 57 55 60 62 61          8/30-Clinical pharmacist still coordinating best plan of care for patient in regards to program participation. Pushed call 2 weeks.

## 2018-08-31 ENCOUNTER — TELEPHONE (OUTPATIENT)
Dept: PRIMARY CARE CLINIC | Facility: CLINIC | Age: 73
End: 2018-08-31

## 2018-08-31 NOTE — TELEPHONE ENCOUNTER
Dr NGUYEN please call Ms Theodore today she said yesterday and today she was feeling bad also she need to speak with you about her Laxis she stated she was still confused on the directions to take medication Please, advise

## 2018-09-04 ENCOUNTER — TELEPHONE (OUTPATIENT)
Dept: INTERNAL MEDICINE | Facility: CLINIC | Age: 73
End: 2018-09-04

## 2018-09-04 NOTE — TELEPHONE ENCOUNTER
Spoke with patient, she states that she is not feeling well and has been vomiting , and that he BP is 162/98. Please Advise.

## 2018-09-04 NOTE — TELEPHONE ENCOUNTER
Albertina- please call this patient and see what her specific question is about lasix. I advised her to take lasix twice daily for at least 3 days (or until her BP normalizes). What's her BP like now? How much lasix is she taking now?    Thanks,  KJ

## 2018-09-05 ENCOUNTER — HOSPITAL ENCOUNTER (INPATIENT)
Facility: HOSPITAL | Age: 73
LOS: 1 days | Discharge: HOME | DRG: 923 | End: 2018-09-06
Attending: EMERGENCY MEDICINE | Admitting: INTERNAL MEDICINE
Payer: MEDICARE

## 2018-09-05 ENCOUNTER — NURSE TRIAGE (OUTPATIENT)
Dept: ADMINISTRATIVE | Facility: CLINIC | Age: 73
End: 2018-09-05

## 2018-09-05 ENCOUNTER — DOCUMENTATION ONLY (OUTPATIENT)
Dept: PHARMACY | Facility: CLINIC | Age: 73
End: 2018-09-05

## 2018-09-05 ENCOUNTER — TELEPHONE (OUTPATIENT)
Dept: INTERNAL MEDICINE | Facility: CLINIC | Age: 73
End: 2018-09-05

## 2018-09-05 DIAGNOSIS — R53.83 FATIGUE: ICD-10-CM

## 2018-09-05 PROBLEM — T68.XXXA HYPOTHERMIA: Status: ACTIVE | Noted: 2018-09-05

## 2018-09-05 LAB
ALBUMIN SERPL BCP-MCNC: 3 G/DL
ALP SERPL-CCNC: 69 U/L
ALT SERPL W/O P-5'-P-CCNC: 15 U/L
ANION GAP SERPL CALC-SCNC: 10 MMOL/L
AST SERPL-CCNC: 42 U/L
BACTERIA #/AREA URNS AUTO: NORMAL /HPF
BASOPHILS # BLD AUTO: 0.02 K/UL
BASOPHILS NFR BLD: 0.4 %
BILIRUB SERPL-MCNC: 0.4 MG/DL
BILIRUB UR QL STRIP: NEGATIVE
BUN SERPL-MCNC: 84 MG/DL
CALCIUM SERPL-MCNC: 9.1 MG/DL
CHLORIDE SERPL-SCNC: 94 MMOL/L
CK SERPL-CCNC: 195 U/L
CLARITY UR REFRACT.AUTO: CLEAR
CO2 SERPL-SCNC: 29 MMOL/L
COLOR UR AUTO: YELLOW
CORTIS SERPL-MCNC: 21 UG/DL
CREAT SERPL-MCNC: 3 MG/DL
CRP SERPL-MCNC: 2.1 MG/L
DIFFERENTIAL METHOD: ABNORMAL
EOSINOPHIL # BLD AUTO: 0.1 K/UL
EOSINOPHIL NFR BLD: 1.4 %
ERYTHROCYTE [DISTWIDTH] IN BLOOD BY AUTOMATED COUNT: 14.2 %
ERYTHROCYTE [SEDIMENTATION RATE] IN BLOOD BY WESTERGREN METHOD: 52 MM/HR
EST. GFR  (AFRICAN AMERICAN): 17.2 ML/MIN/1.73 M^2
EST. GFR  (NON AFRICAN AMERICAN): 14.9 ML/MIN/1.73 M^2
GLUCOSE SERPL-MCNC: 120 MG/DL
GLUCOSE UR QL STRIP: NEGATIVE
HCT VFR BLD AUTO: 35.8 %
HGB BLD-MCNC: 11.7 G/DL
HGB UR QL STRIP: NEGATIVE
HYALINE CASTS UR QL AUTO: 0 /LPF
IMM GRANULOCYTES # BLD AUTO: 0.02 K/UL
IMM GRANULOCYTES NFR BLD AUTO: 0.4 %
INR PPP: 1
KETONES UR QL STRIP: NEGATIVE
LACTATE SERPL-SCNC: 0.9 MMOL/L
LACTATE SERPL-SCNC: 1.3 MMOL/L
LEUKOCYTE ESTERASE UR QL STRIP: NEGATIVE
LYMPHOCYTES # BLD AUTO: 0.5 K/UL
LYMPHOCYTES NFR BLD: 9.2 %
MAGNESIUM SERPL-MCNC: 2.2 MG/DL
MCH RBC QN AUTO: 27.4 PG
MCHC RBC AUTO-ENTMCNC: 32.7 G/DL
MCV RBC AUTO: 84 FL
MICROSCOPIC COMMENT: NORMAL
MONOCYTES # BLD AUTO: 0.4 K/UL
MONOCYTES NFR BLD: 7.4 %
NEUTROPHILS # BLD AUTO: 4.5 K/UL
NEUTROPHILS NFR BLD: 81.2 %
NITRITE UR QL STRIP: NEGATIVE
NRBC BLD-RTO: 0 /100 WBC
PH UR STRIP: 6 [PH] (ref 5–8)
PLATELET # BLD AUTO: 133 K/UL
PMV BLD AUTO: 10.2 FL
POCT GLUCOSE: 134 MG/DL (ref 70–110)
POTASSIUM SERPL-SCNC: 3.7 MMOL/L
PROCALCITONIN SERPL IA-MCNC: 0.12 NG/ML
PROT SERPL-MCNC: 6.9 G/DL
PROT UR QL STRIP: ABNORMAL
PROTHROMBIN TIME: 10.9 SEC
RBC # BLD AUTO: 4.27 M/UL
RBC #/AREA URNS AUTO: 1 /HPF (ref 0–4)
SODIUM SERPL-SCNC: 133 MMOL/L
SP GR UR STRIP: 1.01 (ref 1–1.03)
T4 FREE SERPL-MCNC: 0.96 NG/DL
TSH SERPL DL<=0.005 MIU/L-ACNC: 0.95 UIU/ML
URN SPEC COLLECT METH UR: ABNORMAL
UROBILINOGEN UR STRIP-ACNC: NEGATIVE EU/DL
WBC # BLD AUTO: 5.53 K/UL
WBC #/AREA URNS AUTO: 0 /HPF (ref 0–5)

## 2018-09-05 PROCEDURE — 84443 ASSAY THYROID STIM HORMONE: CPT

## 2018-09-05 PROCEDURE — 99285 EMERGENCY DEPT VISIT HI MDM: CPT | Mod: ,,, | Performed by: EMERGENCY MEDICINE

## 2018-09-05 PROCEDURE — 81001 URINALYSIS AUTO W/SCOPE: CPT

## 2018-09-05 PROCEDURE — 82550 ASSAY OF CK (CPK): CPT

## 2018-09-05 PROCEDURE — 99223 PR INITIAL HOSPITAL CARE,LEVL III: ICD-10-PCS | Mod: AI,,, | Performed by: INTERNAL MEDICINE

## 2018-09-05 PROCEDURE — 93010 ELECTROCARDIOGRAM REPORT: CPT | Mod: ,,, | Performed by: INTERNAL MEDICINE

## 2018-09-05 PROCEDURE — 83605 ASSAY OF LACTIC ACID: CPT

## 2018-09-05 PROCEDURE — 85025 COMPLETE CBC W/AUTO DIFF WBC: CPT

## 2018-09-05 PROCEDURE — 11000001 HC ACUTE MED/SURG PRIVATE ROOM

## 2018-09-05 PROCEDURE — 80053 COMPREHEN METABOLIC PANEL: CPT

## 2018-09-05 PROCEDURE — 86140 C-REACTIVE PROTEIN: CPT

## 2018-09-05 PROCEDURE — 99285 PR EMERGENCY DEPT VISIT,LEVEL V: ICD-10-PCS | Mod: ,,, | Performed by: EMERGENCY MEDICINE

## 2018-09-05 PROCEDURE — 63600175 PHARM REV CODE 636 W HCPCS: Performed by: EMERGENCY MEDICINE

## 2018-09-05 PROCEDURE — 87040 BLOOD CULTURE FOR BACTERIA: CPT | Mod: 59

## 2018-09-05 PROCEDURE — 84439 ASSAY OF FREE THYROXINE: CPT

## 2018-09-05 PROCEDURE — 94761 N-INVAS EAR/PLS OXIMETRY MLT: CPT

## 2018-09-05 PROCEDURE — 93010 EKG 12-LEAD: ICD-10-PCS | Mod: ,,, | Performed by: INTERNAL MEDICINE

## 2018-09-05 PROCEDURE — 82533 TOTAL CORTISOL: CPT

## 2018-09-05 PROCEDURE — 96365 THER/PROPH/DIAG IV INF INIT: CPT

## 2018-09-05 PROCEDURE — 96361 HYDRATE IV INFUSION ADD-ON: CPT

## 2018-09-05 PROCEDURE — 96360 HYDRATION IV INFUSION INIT: CPT

## 2018-09-05 PROCEDURE — 96367 TX/PROPH/DG ADDL SEQ IV INF: CPT

## 2018-09-05 PROCEDURE — 99285 EMERGENCY DEPT VISIT HI MDM: CPT | Mod: 25

## 2018-09-05 PROCEDURE — 82962 GLUCOSE BLOOD TEST: CPT | Mod: 59

## 2018-09-05 PROCEDURE — 84145 PROCALCITONIN (PCT): CPT

## 2018-09-05 PROCEDURE — 25000003 PHARM REV CODE 250: Performed by: PHYSICIAN ASSISTANT

## 2018-09-05 PROCEDURE — 85652 RBC SED RATE AUTOMATED: CPT

## 2018-09-05 PROCEDURE — 85610 PROTHROMBIN TIME: CPT

## 2018-09-05 PROCEDURE — 25000003 PHARM REV CODE 250: Performed by: EMERGENCY MEDICINE

## 2018-09-05 PROCEDURE — 83735 ASSAY OF MAGNESIUM: CPT

## 2018-09-05 PROCEDURE — 99223 1ST HOSP IP/OBS HIGH 75: CPT | Mod: AI,,, | Performed by: INTERNAL MEDICINE

## 2018-09-05 RX ORDER — ROSUVASTATIN CALCIUM 10 MG/1
10 TABLET, COATED ORAL NIGHTLY
Status: CANCELLED | OUTPATIENT
Start: 2018-09-05

## 2018-09-05 RX ORDER — OXYCODONE HYDROCHLORIDE 5 MG/1
10 TABLET ORAL EVERY 4 HOURS PRN
Status: CANCELLED | OUTPATIENT
Start: 2018-09-05

## 2018-09-05 RX ADMIN — AZITHROMYCIN MONOHYDRATE 500 MG: 500 INJECTION, POWDER, LYOPHILIZED, FOR SOLUTION INTRAVENOUS at 03:09

## 2018-09-05 RX ADMIN — CEFTRIAXONE SODIUM 2 G: 2 INJECTION, POWDER, FOR SOLUTION INTRAMUSCULAR; INTRAVENOUS at 02:09

## 2018-09-05 RX ADMIN — SODIUM CHLORIDE 1674 ML: 0.9 INJECTION, SOLUTION INTRAVENOUS at 01:09

## 2018-09-05 NOTE — ED PROVIDER NOTES
Encounter Date: 9/5/2018    SCRIBE #1 NOTE: I, Kim Pandya, am scribing for, and in the presence of,  Dr. Sesay. I have scribed the following portions of the note - the APC attestation.       History     Chief Complaint   Patient presents with    Fatigue     weakness for last two days     Patient is a 72-y.o. -american female w/ a PMHx of HTN, Lupus, PAD, CAD, CKD, CHF who presents to the ED for evaluation of acute weakness. She reports that around 7AM this morning, she began to feel nonspecific, generalized weakness. She states she was unable to maintain her balance when she tried to walk to the restroom this morning. No dizziness or blurry vision. She also reports lack of appetite for the past 2 days as well as 1 episode of vomiting yesterday and chills.  She denies fever, HA, chest pain, cough, SOB, abdominal pain, dysuria, hematuria, blood in stool, constipation, or diarrhea. She endorses medication compliance and denies any recent changes in medication. She did not take anti-hypertensives this morning.          Review of patient's allergies indicates:   Allergen Reactions    Ace inhibitors Swelling     Lips Swelling    Hydralazine analogues Other (See Comments)     Lupus exacerbation    Vioxx [rofecoxib] Swelling      lips swelling    Bactrim [sulfamethoxazole-trimethoprim]      Pt would like this medication to be added due to elevation of creatinine with single kidney.    Clonidine Hallucinations     Hallucinations    Lactose     Arthrotec 50 [diclofenac-misoprostol]      Unknown    Bentyl [dicyclomine]      Not effective    Doxycycline Nausea Only    Lomotil [diphenoxylate-atropine] Nausea And Vomiting     Stomach cramps with vomitting    Lozol [indapamide] Rash    Norvasc [amlodipine]      Not tolerated    Tramadol Nausea Only     Past Medical History:   Diagnosis Date    Acute hypoxemic respiratory failure 4/28/2018    Acute on chronic diastolic congestive heart failure 11/17/2017     Allergy     Anatomical narrow angle glaucoma     Anemia     States diagnosed about a month ago    Aortic atherosclerosis     Arthritis     Cataract     CHF (congestive heart failure)     Chronic kidney disease     Chronic sciatica of left side     Right lower back pain due to sciatica    Coronary artery disease     Depression     Dry eyes     GERD (gastroesophageal reflux disease)     History of colon cancer     Hyperaldosteronism     Hyperlipidemia     Hypertension     Hypothyroidism     Keloid cicatrix     Left ventricular diastolic dysfunction with preserved systolic function     Lupus (systemic lupus erythematosus) 8/17/2012    Malnutrition 5/16/2017    Osteoarthritis of cervical spine 8/17/2012    Osteopenia     PAD (peripheral artery disease)     FRAN (renal artery stenosis)      Past Surgical History:   Procedure Laterality Date    CARDIAC CATHETERIZATION  07/27/2011    x1    CHOLECYSTECTOMY  1999    COLON SURGERY  2000 & 2003    partial removal    EYE SURGERY      HAND SURGERY Bilateral 1996 & 1997    due to carpal tunnel     HERNIA REPAIR      HYSTERECTOMY  1983    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    OOPHORECTOMY      removed one    Peripheral Iridotomy both eyes  1994    laser angle correction    THYROIDECTOMY, PARTIAL  2006    to remove two nodules and one-half thyroid     Family History   Problem Relation Age of Onset    Heart attack Mother     Hypertension Mother     Heart disease Father     Hypertension Father     Diabetes Maternal Grandmother     Glaucoma Maternal Grandmother     Hypertension Sister     Kidney disease Brother     Kidney failure Brother         received donated kidney from sister    No Known Problems Daughter     Diabetes Son     Hypertension Brother     Diabetes Maternal Aunt     No Known Problems Maternal Grandfather     No Known Problems Paternal Grandmother     No Known Problems Paternal Grandfather     Acne Neg Hx      Eczema Neg Hx     Lupus Neg Hx     Psoriasis Neg Hx     Melanoma Neg Hx     Amblyopia Neg Hx     Blindness Neg Hx     Cataracts Neg Hx     Macular degeneration Neg Hx     Retinal detachment Neg Hx     Strabismus Neg Hx      Social History     Tobacco Use    Smoking status: Former Smoker     Packs/day: 1.50     Years: 30.00     Pack years: 45.00     Types: Cigarettes     Start date:      Last attempt to quit: 3/13/1985     Years since quittin.5    Smokeless tobacco: Never Used   Substance Use Topics    Alcohol use: No    Drug use: No     Review of Systems   Constitutional: Positive for appetite change, chills and fatigue. Negative for diaphoresis and fever.   HENT: Negative for rhinorrhea and sore throat.    Eyes: Negative for photophobia, pain and visual disturbance.   Respiratory: Negative for cough and shortness of breath.    Cardiovascular: Negative for chest pain and leg swelling.   Gastrointestinal: Positive for vomiting. Negative for abdominal pain, blood in stool, constipation, diarrhea and nausea.   Genitourinary: Negative for dysuria and hematuria.   Musculoskeletal: Positive for gait problem. Negative for back pain and myalgias.   Skin: Negative for rash and wound.   Neurological: Positive for weakness. Negative for dizziness, syncope and headaches.   Psychiatric/Behavioral: Negative for confusion and dysphoric mood.       Physical Exam     Initial Vitals   BP Pulse Resp Temp SpO2   18 1240 18 1240 18 1240 18 1256 18 1240   (!) 153/64 (!) 53 16 (!) 94.7 °F (34.8 °C) 100 %      MAP       --                Physical Exam    Constitutional: She appears well-developed.   Frail   HENT:   Head: Normocephalic and atraumatic.   Right Ear: External ear normal.   Left Ear: External ear normal.   Nose: Nose normal.   Mouth/Throat: Oropharynx is clear and moist. Mucous membranes are dry.   Eyes: Conjunctivae and EOM are normal. Pupils are equal, round, and reactive to  light.   Neck: Normal range of motion. Neck supple. No JVD present.   Cardiovascular: Normal rate and regular rhythm.   Murmur heard.  Pulmonary/Chest: Breath sounds normal. No respiratory distress.   Abdominal: Soft. Bowel sounds are normal. She exhibits no distension. There is no tenderness.   Musculoskeletal: Normal range of motion. She exhibits no tenderness.   Neurological: She is alert and oriented to person, place, and time. She has normal strength. No cranial nerve deficit.   Skin: Skin is warm and dry.   Psychiatric: She has a normal mood and affect. Thought content normal.         ED Course   Procedures  Labs Reviewed   POCT GLUCOSE - Abnormal; Notable for the following components:       Result Value    POCT Glucose 134 (*)     All other components within normal limits   CULTURE, BLOOD   CULTURE, BLOOD   CBC W/ AUTO DIFFERENTIAL   COMPREHENSIVE METABOLIC PANEL   LACTIC ACID, PLASMA   URINALYSIS, REFLEX TO URINE CULTURE     EKG Readings: (Independently Interpreted)   Initial Reading: No STEMI. Previous EKG: Compared with most recent EKG Rhythm: Sinus Bradycardia. Heart Rate: 54. Conduction: Prolonged QT. Clinical Impression: Sinus Bradycardia       Imaging Results    None          Medical Decision Making:   History:   Old Medical Records: I decided to obtain old medical records.  Initial Assessment:   72 y.o. Female presents to the ED for generalized fatigue. Initial temp was 94.5 rectal. Endorses generalized weakness, decreased appetite, and one ep of vomiting. PE reveals dry MM. Murmur heard on ausculation. Slightly bradycardic. Lungs CTAB. Abdomen soft and nontender. Neurovascularly intact.   Differential Diagnosis:   DDx includes but is not limited to: dehydration, bacteremia, hypoglycemia, electrolyte abnormality, UTI, pneumonia.  Clinical Tests:   Lab Tests: Ordered and Reviewed  Radiological Study: Ordered and Reviewed  ED Management:  Ordered septic workup, CXR.    Stable H&H. Plt 133. BUN 84, Cr  3.0 but down from levels 8d ago (110/3.8).  Lactic acid WNL. Glu 134. CXR reveals no acute process. UA WNL. Blood cultures pending.    Patient started on IV Rocephin and Azithromycin.    Patient admitted to inpatient team for generalized fatigue and hypothermia of unknown origin.  Other:   I discussed test(s) with the performing physician.            Scribe Attestation:   Scribe #1: I performed the above scribed service and the documentation accurately describes the services I performed. I attest to the accuracy of the note.    Attending Attestation:     Physician Attestation Statement for NP/PA:   I have conducted a face to face encounter with this patient in addition to the NP/PA, due to Medical Complexity    Other NP/PA Attestation Additions:    History of Present Illness: General fatigue since yesterday and vomited a couple of times last night. Today, patient felt chills. No sick contact or recent travels.    Physical Exam: Patient is AOx3, hypothermic, bradycardic, and well profuse. No abdominal pain.                       Clinical Impression:   The encounter diagnosis was Fatigue.      Disposition:   Disposition: Admitted  Condition: Stable                        Daksha Brown PA-C  09/05/18 [id number]       Jim Sesay MD  09/06/18 [id number]

## 2018-09-05 NOTE — H&P
[facility] Medicine  History & Physical    Patient Name: Ewelina Arnold  MRN: [id number]  Admission Date: 9/5/2018  Attending Physician: Maria Dolores Nixon MD  Primary Care Provider: Amarilys Johns MD    [facility] Medicine Team: [facility] MED G Maria Dolores Nixon MD     Patient information was obtained from patient and ER records.     Subjective:     Principal Problem:Hypothermia    Chief Complaint:   Chief Complaint   Patient presents with    Fatigue     weakness for last two days        HPI: Ms. Arnold is a 72 year old woman with SLE with recent flare (tx with prednisone x14 days and clobetasol), CKD IV, CAD, diastolic HF, who presented to the Emergency Department for weakness. She reports feeling unwell since yesterday, when she had 3 episodes of vomiting and diarrhea. The vomitus was nonbloody, nonbilious food contents. No melena or hematochezia. She denies any sick contacts, recent raw food, or undercooked food. She vomited her morning meds and was unable to take yesterday's evening medications. She woke this morning feeling malaise, fatigue, and all-over weakness. Was too weak to get out of bed, so called EMS.     She endorses severe chills and sweats as well as reduced appetite. She denies fevers, focal weakness, numbness, vision change, headache, dizziness, syncope, pre-syncope, SOB, CP, cough, dysuria. She has chronic edema which is treated with furosemide and metolazone. Her most recent lupus flare presented with a skin lesion, treated with prednisone and calcipotriene. Her last dose of prednisone was Monday (4mg).     In the Emergency Department, she was hypothermic to 34.8C, hypertensive, and bradycardic. Labs were notable for creatinine 3.0  (at baseline 2.6-3.4). Lactate was normal. ECG showed sinus bradycardia without ischmic changes. UA was negative. CXR normal. She was treated with external rewarming blanket, azithromycin, and ceftriaxone. She is admitted to Hospital  Medicine for further workup and management.     Past Medical History:   Diagnosis Date    Acute hypoxemic respiratory failure 4/28/2018    Acute on chronic diastolic congestive heart failure 11/17/2017    Allergy     Anatomical narrow angle glaucoma     Anemia     States diagnosed about a month ago    Aortic atherosclerosis     Arthritis     Cataract     CHF (congestive heart failure)     Chronic kidney disease     Chronic sciatica of left side     Right lower back pain due to sciatica    Coronary artery disease     Depression     Dry eyes     GERD (gastroesophageal reflux disease)     History of colon cancer     Hyperaldosteronism     Hyperlipidemia     Hypertension     Hypothyroidism     Keloid cicatrix     Left ventricular diastolic dysfunction with preserved systolic function     Lupus (systemic lupus erythematosus) 8/17/2012    Malnutrition 5/16/2017    Osteoarthritis of cervical spine 8/17/2012    Osteopenia     PAD (peripheral artery disease)     FRAN (renal artery stenosis)        Past Surgical History:   Procedure Laterality Date    CARDIAC CATHETERIZATION  07/27/2011    x1    CHOLECYSTECTOMY  1999    COLON SURGERY  2000 & 2003    partial removal    EYE SURGERY      HAND SURGERY Bilateral 1996 & 1997    due to carpal tunnel     HERNIA REPAIR      HYSTERECTOMY  1983    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    Clonidine  Hallucinations     Hallucinations    Lactose     Arthrotec 50 [diclofenac-misoprostol]      Unknown    Bentyl [dicyclomine]      Not effective    Doxycycline Nausea Only    Lomotil [diphenoxylate-atropine] Nausea And Vomiting     Stomach cramps with vomitting    Lozol [indapamide] Rash    Norvasc [amlodipine]      Not tolerated    Tramadol Nausea Only       No current facility-administered medications on file prior to encounter.      Current Outpatient Medications on File Prior to Encounter   Medication Sig    aspirin (ECOTRIN) 81 MG EC tablet Take 1 tablet (81 mg total) by mouth once daily.    carvedilol (COREG) 25 MG tablet Take 1 tablet (25 mg total) by mouth 2 (two) times daily.    citalopram (CELEXA) 20 MG tablet TAKE 1 AND 1/2  TABLETS (30 MG TOTAL) ONCE DAILY.    doxazosin (CARDURA) 1 MG tablet Take 1 tablet (1 mg total) by mouth every evening.    famotidine (PEPCID) 20 MG tablet Take 1 tablet (20 mg total) by mouth once daily.    furosemide (LASIX) 40 MG tablet Take 1 tablet (40 mg total) by mouth daily as needed (for weight gain, shortness of breath).    hydroxychloroquine (PLAQUENIL) 200 mg tablet TAKE 1 TABLET TWICE DAILY    isosorbide mononitrate (IMDUR) 60 MG 24 hr tablet Take 1 tablet (60 mg total) by mouth once daily.    levothyroxine (SYNTHROID) 75 MCG tablet Take 1 tablet (75 mcg total) by mouth before breakfast.    metOLazone (ZAROXOLYN) 2.5 MG tablet Take 2 tablets (5 mg total) by mouth daily as needed (Weigt gain >2Lbs.).    NIFEdipine (PROCARDIA-XL) 60 MG (OSM) 24 hr tablet Take 1 tablet (60 mg total) by mouth 2 (two) times daily.    calcipotriene 0.005 % sclp soln Apply 1 application topically 2 (two) times daily.    clobetasol (TEMOVATE) 0.05 % external solution Apply topically 2 (two) times daily.    ergocalciferol (VITAMIN D2) 50,000 unit Cap Take 1 capsule (50,000 Units total) by mouth every 7 days.    latanoprost 0.005 % ophthalmic solution INSTILL 1 DROP INTO BOTH  EYES EVERY DAY    mometasone (ELOCON) 0.1 % ointment Apply topically once daily. To rashes for 14 days    rosuvastatin (CRESTOR) 10 MG tablet Take 1 tablet (10 mg total) by mouth every evening.     Family History     Problem Relation (Age of Onset)    Diabetes Maternal Grandmother, Son, Maternal Aunt    Glaucoma Maternal Grandmother    Heart attack Mother    Heart disease Father    Hypertension Mother, Father, Sister, Brother    Kidney disease Brother    Kidney failure Brother    No Known Problems Daughter, Maternal Grandfather, Paternal Grandmother, Paternal Grandfather        Tobacco Use    Smoking status: Former Smoker     Packs/day: 1.50     Years: 30.00     Pack years: 45.00     Types: Cigarettes     Start date:      Last attempt to quit: 3/13/1985     Years since quittin.5    Smokeless tobacco: Never Used   Substance and Sexual Activity    Alcohol use: No    Drug use: No    Sexual activity: Not Currently     Partners: Male     Birth control/protection: Abstinence     Review of Systems   Constitutional: Positive for activity change, appetite change, chills and fatigue. Negative for fever.   HENT: Negative for congestion, facial swelling, hearing loss, mouth sores, nosebleeds, postnasal drip, rhinorrhea, sinus pressure, sinus pain, sore throat, tinnitus and voice change.    Eyes: Negative for discharge, redness and visual disturbance.   Respiratory: Negative for cough, choking, chest tightness, shortness of breath and wheezing.    Cardiovascular: Negative for chest pain, palpitations and leg swelling.   Gastrointestinal: Positive for abdominal pain, diarrhea, nausea and vomiting. Negative for abdominal distention, blood in stool and constipation.   Endocrine: Negative for cold intolerance, heat intolerance, polydipsia and polyphagia.   Genitourinary: Negative for difficulty urinating, dysuria, frequency and hematuria.   Musculoskeletal: Negative for arthralgias, back pain, joint swelling and  myalgias.   Skin: Positive for rash (lupus flare). Negative for color change.   Neurological: Positive for weakness. Negative for dizziness, seizures, syncope, facial asymmetry, light-headedness, numbness and headaches.     Objective:     Vital Signs (Most Recent):  Temp: 97.8 °F (36.6 °C) (09/05/18 1633)  Pulse: (!) 56 (09/05/18 1737)  Resp: 11 (09/05/18 1737)  BP: (!) 174/81 (09/05/18 1738)  SpO2: 96 % (09/05/18 1737) Vital Signs (24h Range):  Temp:  [94.7 °F (34.8 °C)-97.8 °F (36.6 °C)] 97.8 °F (36.6 °C)  Pulse:  [53-58] 56  Resp:  [11-16] 11  SpO2:  [94 %-100 %] 96 %  BP: (153-177)/(64-81) 174/81     Weight: 55.8 kg (123 lb)  Body mass index is 21.11 kg/m².    Physical Exam   Constitutional: She is oriented to person, place, and time. She appears well-nourished. She is active and cooperative.  Non-toxic appearance.   HENT:   Head: Normocephalic and atraumatic.   Nose: Nose normal. No mucosal edema or rhinorrhea.   Mouth/Throat: Uvula is midline and oropharynx is clear and moist. Mucous membranes are dry.   Eyes: Conjunctivae, EOM and lids are normal. Pupils are equal, round, and reactive to light. No scleral icterus.   Neck: Trachea normal and full passive range of motion without pain. No JVD present. No tracheal deviation present. No thyromegaly present.   Cardiovascular: Regular rhythm and normal pulses. Bradycardia present. PMI is not displaced.   Murmur heard.   Crescendo decrescendo systolic (LSB) murmur is present with a grade of 3/6.  Pulmonary/Chest: Effort normal and breath sounds normal. No respiratory distress. She has no wheezes. She has no rhonchi. She has no rales.   Abdominal: Soft. Normal appearance and bowel sounds are normal. There is no hepatosplenomegaly. There is no tenderness.   Musculoskeletal: Normal range of motion. She exhibits no edema or tenderness.   Lymphadenopathy:     She has no cervical adenopathy.   Neurological: She is alert and oriented to person, place, and time. She has  normal strength.   Skin: Skin is warm and dry. Capillary refill takes less than 2 seconds. No pallor.   Psychiatric: She has a normal mood and affect. Her speech is normal and behavior is normal.   Nursing note and vitals reviewed.        CRANIAL NERVES     CN III, IV, VI   Pupils are equal, round, and reactive to light.  Extraocular motions are normal.        Significant Labs:   CBC:   Recent Labs   Lab  09/05/18   1331   WBC  5.53   HGB  11.7*   HCT  35.8*   PLT  133*     CMP:   Recent Labs   Lab  09/05/18   1331   NA  133*   K  3.7   CL  94*   CO2  29   GLU  120*   BUN  84*   CREATININE  3.0*   CALCIUM  9.1   PROT  6.9   ALBUMIN  3.0*   BILITOT  0.4   ALKPHOS  69   AST  42*   ALT  15   ANIONGAP  10   EGFRNONAA  14.9*     Lactic Acid:   Recent Labs   Lab  09/05/18   1331  09/05/18   1723   LACTATE  0.9  1.3         Significant Imaging: I have reviewed all pertinent imaging results/findings within the past 24 hours.     Imaging Results          X-Ray Chest AP Portable (Final result)  Result time 09/05/18 14:21:27    Final result by Roseline Zhu MD (09/05/18 14:21:27)                 Impression:      Chronic mild enlargement of the cardiac silhouette with calcific atherosclerosis in the thoracic aorta.  No cardiac decompensation or other acute disease identified.  No pneumonia or other source of sepsis detected.      Electronically signed by: Roseline Zhu MD  Date:    09/05/2018  Time:    14:21             Narrative:    EXAMINATION:  XR CHEST AP PORTABLE    CLINICAL HISTORY:  Sepsis;    TECHNIQUE:  Single frontal view of the chest was performed.    COMPARISON:  04/30/2018.    FINDINGS:  Mediastinal structures are midline.  Cardiac silhouette is mildly enlarged similar to the earlier study.  Calcific plaque is present in the thoracic aorta.    I detect no convincing evidence of pulmonary edema or other manifestation of cardiac decompensation.    The lungs are normally and symmetrically inflated.  I detect no  pulmonary disease, pleural fluid, lymph node enlargement, pneumothorax, pneumomediastinum, pneumoperitoneum or significant osseous abnormality.                                  Assessment/Plan:     * Hypothermia    38.4C on admission (rectal temp). In the setting of suspected gastroenteritis. Given history of chronic steroids (pred 20mg/day), will eval for HPA axis abnormalities. No s/sx sepsis or dysrhythmia. DDx thyroid, adrenal insufficiency, malnutrition, thiamine deficiency, medication adverse effects including beta blocker, alpha blocker, and calcium channel blocker synergy versus statin myopathy.   - Telemetry to monitor for  dysrhythmia  - cortisol level  - CPK  - lactate   - ECG: sinus bradycardia, no ischemic changes  - TSH and FT4  - PT/INR  - Mag level  - thiamine level  - BAL  - Deb Hugger for passive external reqwarming        Fatigue    Deconditioning  Suspect dehydration in the setting of recent gastroenteritis. No concern for lupus flare; some consideration of adrenal insufficiency after recent prednisone taper. Low concern for active infection - CXR normal, UA negative, lactate normal. Some debility, has 7 steps to house.  Diarrhea has now resolved.  - s/p IVF in the ED  - stop antimicrobials  - Oral rehydration  - PT/OT to eval          Anemia of chronic renal failure, stage 4 (severe)    Chronic, stable  - CBC daily           Chronic diastolic congestive heart failure    Aortic Stenosis  Not a candidate for valve repair due to low likelihood of improving cardiac output. Appears euvolemic.   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 Cre appears at baseline.  - BMP daily          GERD (gastroesophageal reflux  disease)    Chronic, no symptoms at this time  - continue famotidine 20mg daily           Acquired hypothyroidism    Chronic  - TSH and FT4  - levothyroxine 75mcg daily           Resistant hypertension    Chronic. Outpatient concerns for medication adherence.   - restart home carvedilol tomorrow 25mg po bid  - HOLD home doxazosin in bradycardia, hypothermia  - HOLD home furosemide and metolazone, euvolemic  - home isosorbide mononitrate 60mg daily  - nifedipine 60mg po bid          Coronary artery disease due to calcified coronary lesion    S/p PCI, remote  - continue ASA 81mg daily  - hold rosuvastatin in the setting of myopathy workup, resume tomorrow if CPK normal            Other forms of systemic lupus erythematosus    Chronic. Recent flare with erythema nodosum treated with prednisone 20mg/day x 2 weeks.  - continue hydroxychloroquine 200mg BID            VTE Risk Mitigation (From admission, onward)    None        FEN: cardiac diet  TLD: PIV  VTE: heparin 5000u SQ Q8hrs  CODE: FULL  DISPO: Likely home in 1-2 days pending clinical stability     Maria Dolores Nioxn M.D., M.P.H.  Department of Hospital Medicine  [facility]  (pager) [phone]  (spect) [phone]

## 2018-09-05 NOTE — TELEPHONE ENCOUNTER
Message received about patient's call regarding symptoms 2 hours after she called. She is currently in ED.    Thanks,  WENDY

## 2018-09-05 NOTE — ASSESSMENT & PLAN NOTE
Chronic. Recent flare with erythema nodosum treated with prednisone 20mg/day x 2 weeks.  - continue hydroxychloroquine 200mg BID

## 2018-09-05 NOTE — ED TRIAGE NOTES
"EMS reports that patient not "feeling well" since yesterday. Patient with one episode of emesis yesterday. Patient states that she still wasn't feeling well when she got up this morning. Upon EMS arrival today, patient was found to be hypoglycemic with a FSBS of 50. Patient presents to ER A&Ox4 and with FSBS 134. Patient denies any bloody emesis or stools. Patient denies chest pain, SOB, dysuria, or fevers.   "

## 2018-09-05 NOTE — ASSESSMENT & PLAN NOTE
Chronic. Outpatient concerns for medication adherence.   - restart home carvedilol tomorrow 25mg po bid  - HOLD home doxazosin in bradycardia, hypothermia  - HOLD home furosemide and metolazone, euvolemic  - home isosorbide mononitrate 60mg daily  - nifedipine 60mg po bid

## 2018-09-05 NOTE — ED NOTES
"Ewelina Arnold, a 72 y.o. female presents to the ED via [facility] with CC "not feeling well" and hypoglycemia.      Patient identifiers verified verbally with patient and correct for Ewelina Arnold.    LOC/ APPEARANCE: The patient is awake, alert and oriented x 4. Pt is speaking appropriately, no slurred speech. Patient resting comfortably and in no acute distress. Pt is clean and well groomed. No JVD visible. Pt reports pain level of 0. Pt updated on POC. Bed low and locked with side rails up x2, call bell in pt reach.  SKIN: Skin is warm dry and intact, and color is consistent with ethnicity. Capillary refill <3 seconds. No breakdown or brusing visible and mucus membranes moist and acyanotic. Patient with dry mucous membranes present.  MUSCULOSKELETAL: Full range of motion present in all extremities. Hand  equal and leg strength strong +5 bilaterally. Patient reports generalized weakness.   RESPIRATORY: Airway is open and patent. Respirations-unlabored, regular rate, equal bilaterally on inspiration and expiration. No accessory muscle use noted. Lungs clear to auscultation in all fields bilaterally anterior and posterior.   CARDIAC: Patient is evelyn on monitor.  No peripheral edema noted, and patient has no c/o chest pain. Peripheral pulses present equal and strong throughout.  ABDOMEN: Soft and non-tender to palpation with no distention noted. Normoactive bowel sounds x4 quadrants. Pt has no complaints of abnormal bowel movements, denies blood in stool. Pt reports normal appetite. Patient does endorse intermittent nausea with one episode of emesis that happened yesterday.  NEUROLOGIC: Eyes open spontaneously and facial expression symmetrical. Pt behavior appropriate to situation, and pt follows commands.  Pt reports sensation present in all extremities when touched with a finger. PERRLA  : No complaints of frequency, burning, urgency or blood in the urine. No complaints of incontinence.  "

## 2018-09-05 NOTE — SUBJECTIVE & OBJECTIVE
Past Medical History:   Diagnosis Date    Acute hypoxemic respiratory failure 4/28/2018    Acute on chronic diastolic congestive heart failure 11/17/2017    Allergy     Anatomical narrow angle glaucoma     Anemia     States diagnosed about a month ago    Aortic atherosclerosis     Arthritis     Cataract     CHF (congestive heart failure)     Chronic kidney disease     Chronic sciatica of left side     Right lower back pain due to sciatica    Coronary artery disease     Depression     Dry eyes     GERD (gastroesophageal reflux disease)     History of colon cancer     Hyperaldosteronism     Hyperlipidemia     Hypertension     Hypothyroidism     Keloid cicatrix     Left ventricular diastolic dysfunction with preserved systolic function     Lupus (systemic lupus erythematosus) 8/17/2012    Malnutrition 5/16/2017    Osteoarthritis of cervical spine 8/17/2012    Osteopenia     PAD (peripheral artery disease)     FRAN (renal artery stenosis)        Past Surgical History:   Procedure Laterality Date    CARDIAC CATHETERIZATION  07/27/2011    x1    CHOLECYSTECTOMY  1999    COLON SURGERY  2000 & 2003    partial removal    EYE SURGERY      HAND SURGERY Bilateral 1996 & 1997    due to carpal tunnel     HERNIA REPAIR      HYSTERECTOMY  1983    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    Clonidine Hallucinations     Hallucinations    Lactose      Arthrotec 50 [diclofenac-misoprostol]      Unknown    Bentyl [dicyclomine]      Not effective    Doxycycline Nausea Only    Lomotil [diphenoxylate-atropine] Nausea And Vomiting     Stomach cramps with vomitting    Lozol [indapamide] Rash    Norvasc [amlodipine]      Not tolerated    Tramadol Nausea Only       No current facility-administered medications on file prior to encounter.      Current Outpatient Medications on File Prior to Encounter   Medication Sig    aspirin (ECOTRIN) 81 MG EC tablet Take 1 tablet (81 mg total) by mouth once daily.    carvedilol (COREG) 25 MG tablet Take 1 tablet (25 mg total) by mouth 2 (two) times daily.    citalopram (CELEXA) 20 MG tablet TAKE 1 AND 1/2  TABLETS (30 MG TOTAL) ONCE DAILY.    doxazosin (CARDURA) 1 MG tablet Take 1 tablet (1 mg total) by mouth every evening.    famotidine (PEPCID) 20 MG tablet Take 1 tablet (20 mg total) by mouth once daily.    furosemide (LASIX) 40 MG tablet Take 1 tablet (40 mg total) by mouth daily as needed (for weight gain, shortness of breath).    hydroxychloroquine (PLAQUENIL) 200 mg tablet TAKE 1 TABLET TWICE DAILY    isosorbide mononitrate (IMDUR) 60 MG 24 hr tablet Take 1 tablet (60 mg total) by mouth once daily.    levothyroxine (SYNTHROID) 75 MCG tablet Take 1 tablet (75 mcg total) by mouth before breakfast.    metOLazone (ZAROXOLYN) 2.5 MG tablet Take 2 tablets (5 mg total) by mouth daily as needed (Weigt gain >2Lbs.).    NIFEdipine (PROCARDIA-XL) 60 MG (OSM) 24 hr tablet Take 1 tablet (60 mg total) by mouth 2 (two) times daily.    calcipotriene 0.005 % sclp soln Apply 1 application topically 2 (two) times daily.    clobetasol (TEMOVATE) 0.05 % external solution Apply topically 2 (two) times daily.    ergocalciferol (VITAMIN D2) 50,000 unit Cap Take 1 capsule (50,000 Units total) by mouth every 7 days.    latanoprost 0.005 % ophthalmic solution INSTILL 1 DROP INTO BOTH EYES EVERY DAY    mometasone (ELOCON) 0.1 %  ointment Apply topically once daily. To rashes for 14 days    rosuvastatin (CRESTOR) 10 MG tablet Take 1 tablet (10 mg total) by mouth every evening.     Family History     Problem Relation (Age of Onset)    Diabetes Maternal Grandmother, Son, Maternal Aunt    Glaucoma Maternal Grandmother    Heart attack Mother    Heart disease Father    Hypertension Mother, Father, Sister, Brother    Kidney disease Brother    Kidney failure Brother    No Known Problems Daughter, Maternal Grandfather, Paternal Grandmother, Paternal Grandfather        Tobacco Use    Smoking status: Former Smoker     Packs/day: 1.50     Years: 30.00     Pack years: 45.00     Types: Cigarettes     Start date:      Last attempt to quit: 3/13/1985     Years since quittin.5    Smokeless tobacco: Never Used   Substance and Sexual Activity    Alcohol use: No    Drug use: No    Sexual activity: Not Currently     Partners: Male     Birth control/protection: Abstinence     Review of Systems   Constitutional: Positive for activity change, appetite change, chills and fatigue. Negative for fever.   HENT: Negative for congestion, facial swelling, hearing loss, mouth sores, nosebleeds, postnasal drip, rhinorrhea, sinus pressure, sinus pain, sore throat, tinnitus and voice change.    Eyes: Negative for discharge, redness and visual disturbance.   Respiratory: Negative for cough, choking, chest tightness, shortness of breath and wheezing.    Cardiovascular: Negative for chest pain, palpitations and leg swelling.   Gastrointestinal: Positive for abdominal pain, diarrhea, nausea and vomiting. Negative for abdominal distention, blood in stool and constipation.   Endocrine: Negative for cold intolerance, heat intolerance, polydipsia and polyphagia.   Genitourinary: Negative for difficulty urinating, dysuria, frequency and hematuria.   Musculoskeletal: Negative for arthralgias, back pain, joint swelling and myalgias.   Skin: Positive for rash (lupus  flare). Negative for color change.   Neurological: Positive for weakness. Negative for dizziness, seizures, syncope, facial asymmetry, light-headedness, numbness and headaches.     Objective:     Vital Signs (Most Recent):  Temp: 97.8 °F (36.6 °C) (09/05/18 1633)  Pulse: (!) 56 (09/05/18 1737)  Resp: 11 (09/05/18 1737)  BP: (!) 174/81 (09/05/18 1738)  SpO2: 96 % (09/05/18 1737) Vital Signs (24h Range):  Temp:  [94.7 °F (34.8 °C)-97.8 °F (36.6 °C)] 97.8 °F (36.6 °C)  Pulse:  [53-58] 56  Resp:  [11-16] 11  SpO2:  [94 %-100 %] 96 %  BP: (153-177)/(64-81) 174/81     Weight: 55.8 kg (123 lb)  Body mass index is 21.11 kg/m².    Physical Exam   Constitutional: She is oriented to person, place, and time. She appears well-nourished. She is active and cooperative.  Non-toxic appearance.   HENT:   Head: Normocephalic and atraumatic.   Nose: Nose normal. No mucosal edema or rhinorrhea.   Mouth/Throat: Uvula is midline and oropharynx is clear and moist. Mucous membranes are dry.   Eyes: Conjunctivae, EOM and lids are normal. Pupils are equal, round, and reactive to light. No scleral icterus.   Neck: Trachea normal and full passive range of motion without pain. No JVD present. No tracheal deviation present. No thyromegaly present.   Cardiovascular: Regular rhythm and normal pulses. Bradycardia present. PMI is not displaced.   Murmur heard.   Crescendo decrescendo systolic (LSB) murmur is present with a grade of 3/6.  Pulmonary/Chest: Effort normal and breath sounds normal. No respiratory distress. She has no wheezes. She has no rhonchi. She has no rales.   Abdominal: Soft. Normal appearance and bowel sounds are normal. There is no hepatosplenomegaly. There is no tenderness.   Musculoskeletal: Normal range of motion. She exhibits no edema or tenderness.   Lymphadenopathy:     She has no cervical adenopathy.   Neurological: She is alert and oriented to person, place, and time. She has normal strength.   Skin: Skin is warm and dry.  Capillary refill takes less than 2 seconds. No pallor.   Psychiatric: She has a normal mood and affect. Her speech is normal and behavior is normal.   Nursing note and vitals reviewed.        CRANIAL NERVES     CN III, IV, VI   Pupils are equal, round, and reactive to light.  Extraocular motions are normal.        Significant Labs:   CBC:   Recent Labs   Lab  09/05/18   1331   WBC  5.53   HGB  11.7*   HCT  35.8*   PLT  133*     CMP:   Recent Labs   Lab  09/05/18   1331   NA  133*   K  3.7   CL  94*   CO2  29   GLU  120*   BUN  84*   CREATININE  3.0*   CALCIUM  9.1   PROT  6.9   ALBUMIN  3.0*   BILITOT  0.4   ALKPHOS  69   AST  42*   ALT  15   ANIONGAP  10   EGFRNONAA  14.9*     Lactic Acid:   Recent Labs   Lab  09/05/18   1331  09/05/18   1723   LACTATE  0.9  1.3         Significant Imaging: I have reviewed all pertinent imaging results/findings within the past 24 hours.     Imaging Results          X-Ray Chest AP Portable (Final result)  Result time 09/05/18 14:21:27    Final result by Roseline Zhu MD (09/05/18 14:21:27)                 Impression:      Chronic mild enlargement of the cardiac silhouette with calcific atherosclerosis in the thoracic aorta.  No cardiac decompensation or other acute disease identified.  No pneumonia or other source of sepsis detected.      Electronically signed by: Roseline Zhu MD  Date:    09/05/2018  Time:    14:21             Narrative:    EXAMINATION:  XR CHEST AP PORTABLE    CLINICAL HISTORY:  Sepsis;    TECHNIQUE:  Single frontal view of the chest was performed.    COMPARISON:  04/30/2018.    FINDINGS:  Mediastinal structures are midline.  Cardiac silhouette is mildly enlarged similar to the earlier study.  Calcific plaque is present in the thoracic aorta.    I detect no convincing evidence of pulmonary edema or other manifestation of cardiac decompensation.    The lungs are normally and symmetrically inflated.  I detect no pulmonary disease, pleural fluid, lymph node  enlargement, pneumothorax, pneumomediastinum, pneumoperitoneum or significant osseous abnormality.

## 2018-09-05 NOTE — TELEPHONE ENCOUNTER
"    Reason for Disposition   Shock suspected (e.g., cold/pale/clammy skin, too weak to stand, low BP, rapid pulse)    Answer Assessment - Initial Assessment Questions  1. ONSET: "When did the sweating start?"       today  2. LOCATION: "What part of your body has excessive sweating?" (e.g., entire body; just face, underarms, palms, or soles of feet).       Entire body  3. SEVERITY: "How bad is the sweating?"    (Scale 1-10; or mild, moderate, severe)    -  MILD (1-3): doesn't interfere with normal activities     -  MODERATE (4-7): interferes with normal activities (e.g., work or school) or awakens from sleep; causes embarrassment in social situations     -  SEVERE (8-10): drenching sweats and has to change bed clothes or bed linens.     severe  4. CAUSE: "What do you think is causing the sweating?"      unsure  5. FEVER: "Have you been having fevers?"      -  6. OTHER SYMPTOMS: "Do you have any other symptoms?" (e.g., chest pain, difficulty breathing, lightheadedness, weight loss)     Cool skin    Protocols used:  [id number]    Spoke to patient's daughter. She states Ms. Arnold is sweaty and cool. Caller advised to call 911.   "

## 2018-09-05 NOTE — ASSESSMENT & PLAN NOTE
S/p PCI, remote  - continue ASA 81mg daily  - hold rosuvastatin in the setting of myopathy workup, resume tomorrow if CPK normal

## 2018-09-05 NOTE — ASSESSMENT & PLAN NOTE
Aortic Stenosis  Not a candidate for valve repair due to low likelihood of improving cardiac output. Appears euvolemic.   - hold carvedilol in hypothermia/bradycardia  - continue carvedilol 25mg po bid tomorrow am

## 2018-09-05 NOTE — HPI
Ms. Arnold is a 72 year old woman with SLE with recent flare (tx with prednisone x14 days and clobetasol), CKD IV, CAD, diastolic HF, who presented to the Emergency Department for weakness. She reports feeling unwell since yesterday, when she had 3 episodes of vomiting and diarrhea. The vomitus was nonbloody, nonbilious food contents. No melena or hematochezia. She denies any sick contacts, recent raw food, or undercooked food. She vomited her morning meds and was unable to take yesterday's evening medications. She woke this morning feeling malaise, fatigue, and all-over weakness. Was too weak to get out of bed, so called EMS.     She endorses severe chills and sweats as well as reduced appetite. She denies fevers, focal weakness, numbness, vision change, headache, dizziness, syncope, pre-syncope, SOB, CP, cough, dysuria. She has chronic edema which is treated with furosemide and metolazone. Her most recent lupus flare presented with a skin lesion, treated with prednisone and calcipotriene. Her last dose of prednisone was Monday (4mg).     In the Emergency Department, she was hypothermic to 34.8C, hypertensive, and bradycardic. Labs were notable for creatinine 3.0  (at baseline 2.6-3.4). Lactate was normal. ECG showed sinus bradycardia without ischmic changes. UA was negative. CXR normal. She was treated with external rewarming blanket, azithromycin, and ceftriaxone. She is admitted to Hospital Medicine for further workup and management.

## 2018-09-05 NOTE — ASSESSMENT & PLAN NOTE
Deconditioning  Suspect dehydration in the setting of recent gastroenteritis. No concern for lupus flare; some consideration of adrenal insufficiency after recent prednisone taper. Low concern for active infection - CXR normal, UA negative, lactate normal. Some debility, has 7 steps to house.  Diarrhea has now resolved.  - s/p IVF in the ED  - stop antimicrobials  - Oral rehydration  - PT/OT to alvaroal

## 2018-09-05 NOTE — ASSESSMENT & PLAN NOTE
38.4C on admission (rectal temp). In the setting of suspected gastroenteritis. Given history of chronic steroids (pred 20mg/day), will eval for HPA axis abnormalities. No s/sx sepsis or dysrhythmia. DDx thyroid, adrenal insufficiency, malnutrition, thiamine deficiency, medication adverse effects including beta blocker, alpha blocker, and calcium channel blocker synergy versus statin myopathy.   - Telemetry to monitor for  dysrhythmia  - cortisol level  - CPK  - lactate   - ECG: sinus bradycardia, no ischemic changes  - TSH and FT4  - PT/INR  - Mag level  - thiamine level  - BAL  - Deb Landa for passive external reqwarming

## 2018-09-06 ENCOUNTER — TELEPHONE (OUTPATIENT)
Dept: INTERNAL MEDICINE | Facility: CLINIC | Age: 73
End: 2018-09-06

## 2018-09-06 VITALS
HEIGHT: 64 IN | DIASTOLIC BLOOD PRESSURE: 64 MMHG | SYSTOLIC BLOOD PRESSURE: 157 MMHG | OXYGEN SATURATION: 97 % | RESPIRATION RATE: 18 BRPM | TEMPERATURE: 99 F | WEIGHT: 123 LBS | HEART RATE: 62 BPM | BODY MASS INDEX: 21 KG/M2

## 2018-09-06 LAB
CORTIS SERPL-MCNC: 16.4 UG/DL
CORTIS SERPL-MCNC: 20.4 UG/DL
POCT GLUCOSE: 66 MG/DL (ref 70–110)

## 2018-09-06 PROCEDURE — 25000003 PHARM REV CODE 250: Performed by: INTERNAL MEDICINE

## 2018-09-06 PROCEDURE — 99239 PR HOSPITAL DISCHARGE DAY,>30 MIN: ICD-10-PCS | Mod: ,,, | Performed by: INTERNAL MEDICINE

## 2018-09-06 PROCEDURE — 99239 HOSP IP/OBS DSCHRG MGMT >30: CPT | Mod: ,,, | Performed by: INTERNAL MEDICINE

## 2018-09-06 PROCEDURE — 63600175 PHARM REV CODE 636 W HCPCS: Performed by: INTERNAL MEDICINE

## 2018-09-06 PROCEDURE — 82533 TOTAL CORTISOL: CPT | Mod: 91

## 2018-09-06 PROCEDURE — 36415 COLL VENOUS BLD VENIPUNCTURE: CPT

## 2018-09-06 RX ORDER — LEVOTHYROXINE SODIUM 75 UG/1
75 TABLET ORAL
Status: DISCONTINUED | OUTPATIENT
Start: 2018-09-06 | End: 2018-09-06 | Stop reason: HOSPADM

## 2018-09-06 RX ORDER — HYDROCODONE BITARTRATE AND ACETAMINOPHEN 5; 325 MG/1; MG/1
1 TABLET ORAL EVERY 4 HOURS PRN
Status: DISCONTINUED | OUTPATIENT
Start: 2018-09-06 | End: 2018-09-06 | Stop reason: HOSPADM

## 2018-09-06 RX ORDER — SODIUM CHLORIDE 0.9 % (FLUSH) 0.9 %
5 SYRINGE (ML) INJECTION
Status: DISCONTINUED | OUTPATIENT
Start: 2018-09-06 | End: 2018-09-06 | Stop reason: HOSPADM

## 2018-09-06 RX ORDER — IBUPROFEN 200 MG
24 TABLET ORAL
Status: DISCONTINUED | OUTPATIENT
Start: 2018-09-06 | End: 2018-09-06 | Stop reason: HOSPADM

## 2018-09-06 RX ORDER — OXYCODONE HYDROCHLORIDE 5 MG/1
10 TABLET ORAL EVERY 6 HOURS PRN
Status: DISCONTINUED | OUTPATIENT
Start: 2018-09-06 | End: 2018-09-06

## 2018-09-06 RX ORDER — ASPIRIN 81 MG/1
81 TABLET ORAL DAILY
Status: DISCONTINUED | OUTPATIENT
Start: 2018-09-06 | End: 2018-09-06 | Stop reason: HOSPADM

## 2018-09-06 RX ORDER — LATANOPROST 50 UG/ML
1 SOLUTION/ DROPS OPHTHALMIC DAILY
Status: DISCONTINUED | OUTPATIENT
Start: 2018-09-06 | End: 2018-09-06 | Stop reason: HOSPADM

## 2018-09-06 RX ORDER — ONDANSETRON 2 MG/ML
4 INJECTION INTRAMUSCULAR; INTRAVENOUS EVERY 8 HOURS PRN
Status: DISCONTINUED | OUTPATIENT
Start: 2018-09-06 | End: 2018-09-06 | Stop reason: HOSPADM

## 2018-09-06 RX ORDER — RAMELTEON 8 MG/1
8 TABLET ORAL NIGHTLY PRN
Status: DISCONTINUED | OUTPATIENT
Start: 2018-09-06 | End: 2018-09-06 | Stop reason: HOSPADM

## 2018-09-06 RX ORDER — FAMOTIDINE 20 MG/1
20 TABLET, FILM COATED ORAL DAILY
Status: DISCONTINUED | OUTPATIENT
Start: 2018-09-06 | End: 2018-09-06 | Stop reason: HOSPADM

## 2018-09-06 RX ORDER — GLUCAGON 1 MG
1 KIT INJECTION
Status: DISCONTINUED | OUTPATIENT
Start: 2018-09-06 | End: 2018-09-06 | Stop reason: HOSPADM

## 2018-09-06 RX ORDER — ONDANSETRON 8 MG/1
8 TABLET, ORALLY DISINTEGRATING ORAL EVERY 8 HOURS PRN
Status: DISCONTINUED | OUTPATIENT
Start: 2018-09-06 | End: 2018-09-06 | Stop reason: HOSPADM

## 2018-09-06 RX ORDER — HYDROXYCHLOROQUINE SULFATE 200 MG/1
200 TABLET, FILM COATED ORAL 2 TIMES DAILY
Status: DISCONTINUED | OUTPATIENT
Start: 2018-09-06 | End: 2018-09-06 | Stop reason: HOSPADM

## 2018-09-06 RX ORDER — INSULIN ASPART 100 [IU]/ML
0-5 INJECTION, SOLUTION INTRAVENOUS; SUBCUTANEOUS
Status: DISCONTINUED | OUTPATIENT
Start: 2018-09-06 | End: 2018-09-06 | Stop reason: HOSPADM

## 2018-09-06 RX ORDER — COSYNTROPIN 0.25 MG/ML
0.25 INJECTION, POWDER, FOR SOLUTION INTRAMUSCULAR; INTRAVENOUS ONCE
Status: COMPLETED | OUTPATIENT
Start: 2018-09-06 | End: 2018-09-06

## 2018-09-06 RX ORDER — ACETAMINOPHEN 325 MG/1
650 TABLET ORAL EVERY 4 HOURS PRN
Status: DISCONTINUED | OUTPATIENT
Start: 2018-09-06 | End: 2018-09-06 | Stop reason: HOSPADM

## 2018-09-06 RX ORDER — OXYCODONE HYDROCHLORIDE 5 MG/1
5 TABLET ORAL EVERY 6 HOURS PRN
Status: DISCONTINUED | OUTPATIENT
Start: 2018-09-06 | End: 2018-09-06

## 2018-09-06 RX ORDER — CARVEDILOL 25 MG/1
25 TABLET ORAL 2 TIMES DAILY
Status: DISCONTINUED | OUTPATIENT
Start: 2018-09-06 | End: 2018-09-06 | Stop reason: HOSPADM

## 2018-09-06 RX ORDER — HEPARIN SODIUM 5000 [USP'U]/ML
5000 INJECTION, SOLUTION INTRAVENOUS; SUBCUTANEOUS EVERY 8 HOURS
Status: DISCONTINUED | OUTPATIENT
Start: 2018-09-06 | End: 2018-09-06 | Stop reason: HOSPADM

## 2018-09-06 RX ORDER — IBUPROFEN 200 MG
16 TABLET ORAL
Status: DISCONTINUED | OUTPATIENT
Start: 2018-09-06 | End: 2018-09-06 | Stop reason: HOSPADM

## 2018-09-06 RX ORDER — POLYETHYLENE GLYCOL 3350 17 G/17G
17 POWDER, FOR SOLUTION ORAL DAILY
Status: DISCONTINUED | OUTPATIENT
Start: 2018-09-06 | End: 2018-09-06 | Stop reason: HOSPADM

## 2018-09-06 RX ORDER — NIFEDIPINE 30 MG/1
60 TABLET, EXTENDED RELEASE ORAL 2 TIMES DAILY
Status: DISCONTINUED | OUTPATIENT
Start: 2018-09-06 | End: 2018-09-06 | Stop reason: HOSPADM

## 2018-09-06 RX ORDER — ONDANSETRON 2 MG/ML
4 INJECTION INTRAMUSCULAR; INTRAVENOUS EVERY 8 HOURS PRN
Status: DISCONTINUED | OUTPATIENT
Start: 2018-09-06 | End: 2018-09-06

## 2018-09-06 RX ORDER — ISOSORBIDE MONONITRATE 30 MG/1
60 TABLET, EXTENDED RELEASE ORAL DAILY
Status: DISCONTINUED | OUTPATIENT
Start: 2018-09-06 | End: 2018-09-06 | Stop reason: HOSPADM

## 2018-09-06 RX ADMIN — ISOSORBIDE MONONITRATE 60 MG: 30 TABLET, EXTENDED RELEASE ORAL at 09:09

## 2018-09-06 RX ADMIN — HEPARIN SODIUM 5000 UNITS: 5000 INJECTION, SOLUTION INTRAVENOUS; SUBCUTANEOUS at 02:09

## 2018-09-06 RX ADMIN — ASPIRIN 81 MG: 81 TABLET, COATED ORAL at 09:09

## 2018-09-06 RX ADMIN — FAMOTIDINE 20 MG: 20 TABLET, FILM COATED ORAL at 09:09

## 2018-09-06 RX ADMIN — CITALOPRAM HYDROBROMIDE 30 MG: 20 TABLET ORAL at 09:09

## 2018-09-06 RX ADMIN — LATANOPROST 1 DROP: 50 SOLUTION OPHTHALMIC at 09:09

## 2018-09-06 RX ADMIN — COSYNTROPIN 0.25 MG: 0.25 INJECTION, POWDER, LYOPHILIZED, FOR SOLUTION INTRAVENOUS at 11:09

## 2018-09-06 RX ADMIN — NIFEDIPINE 60 MG: 30 TABLET, FILM COATED, EXTENDED RELEASE ORAL at 10:09

## 2018-09-06 RX ADMIN — HYDROXYCHLOROQUINE SULFATE 200 MG: 200 TABLET, FILM COATED ORAL at 09:09

## 2018-09-06 RX ADMIN — POLYETHYLENE GLYCOL 3350 17 G: 17 POWDER, FOR SOLUTION ORAL at 09:09

## 2018-09-06 RX ADMIN — CARVEDILOL 25 MG: 25 TABLET, FILM COATED ORAL at 09:09

## 2018-09-06 RX ADMIN — LEVOTHYROXINE SODIUM 75 MCG: 75 TABLET ORAL at 10:09

## 2018-09-06 NOTE — NURSING
Pt acknowledged understanding of discharge instructions, Telemetry, Visi and IV's X2 DC'd. Pt contacted family regarding transportation home. Charge nurse advised. Will continue to monitor.

## 2018-09-06 NOTE — TELEPHONE ENCOUNTER
Patient admitted to hospital, please reach out to her to schedule discharge follow-up.    WENDY Bahena

## 2018-09-07 ENCOUNTER — TELEPHONE (OUTPATIENT)
Dept: INTERNAL MEDICINE | Facility: CLINIC | Age: 73
End: 2018-09-07

## 2018-09-07 ENCOUNTER — TELEPHONE (OUTPATIENT)
Dept: NEUROLOGY | Facility: CLINIC | Age: 73
End: 2018-09-07

## 2018-09-07 PROBLEM — K52.9 GASTROENTERITIS, ACUTE: Status: ACTIVE | Noted: 2018-09-07

## 2018-09-07 PROBLEM — N18.4 ANEMIA IN STAGE 4 CHRONIC KIDNEY DISEASE: Status: ACTIVE | Noted: 2017-07-25

## 2018-09-07 NOTE — DISCHARGE SUMMARY
[facility] Hospital Medicine  Discharge Summary      Patient Name: Ewelina Arnold  MRN: [id number]  Admission Date: 9/5/2018  Hospital Length of Stay: 1 days  Discharge Date and Time: 9/6/2018  5:38 PM  Attending Physician: No att. providers found   Discharging Provider: Maria Dolores Nixon MD  Primary Care Provider: Amarilys Johns MD  Hospital Medicine Team: [facility] Maria Dolores Nixon MD    HPI:   Ms. Arnold is a 72 year old woman with SLE with recent flare (tx with prednisone x14 days and clobetasol), CKD IV, CAD, diastolic HF, who presented to the Emergency Department for weakness. She reports feeling unwell since yesterday, when she had 3 episodes of vomiting and diarrhea. The vomitus was nonbloody, nonbilious food contents. No melena or hematochezia. She denies any sick contacts, recent raw food, or undercooked food. She vomited her morning meds and was unable to take yesterday's evening medications. She woke this morning feeling malaise, fatigue, and all-over weakness. Was too weak to get out of bed, so called EMS.     She endorses severe chills and sweats as well as reduced appetite. She denies fevers, focal weakness, numbness, vision change, headache, dizziness, syncope, pre-syncope, SOB, CP, cough, dysuria. She has chronic edema which is treated with furosemide and metolazone. Her most recent lupus flare presented with a skin lesion, treated with prednisone and calcipotriene. Her last dose of prednisone was Monday (4mg).     In the Emergency Department, she was hypothermic to 34.8C, hypertensive, and bradycardic. Labs were notable for creatinine 3.0  (at baseline 2.6-3.4). Lactate was normal. ECG showed sinus bradycardia without ischmic changes. UA was negative. CXR normal. She was treated with external rewarming blanket, azithromycin, and ceftriaxone. She is admitted to Hospital Medicine for further workup and management.        Hospital Course:   Ms. Arnold was admitted to  Hospital Medicine for management of hypothermia and fatigue. Her clinical picture was most consistent with a recent viral gastroenteritis with subsequent poor oral intake and dehydration.     Her hypothermia improved with rewarming blankets. Telemetry showed no dysrhythmia. Her GI symptoms of vomiting and diarrhea resolved spontaneously. She was given gentle IV fluids for dehydration. Given her history of lupus, diastolic heart failure, steroid use, and thyroid disease, she was evaluated for hormonal abnormalities. TSH, electrolytes, and lactate were normal. CPK was mildly elevated. Cortisol stimulation testing showed no adrenal insufficiency. Her cardiac medications were resumed prior to discharge.    On day of discharge, she felt well, with renewed energy and resolution of her weakness and fatigue. She was discharged to home in good condition.      Consults:  None    Final Active Diagnoses:    Diagnosis Date Noted POA    PRINCIPAL PROBLEM:  Hypothermia [T68.XXXA] 09/05/2018 Yes    Fatigue [R53.83] 09/05/2018 Yes    Anemia in stage 4 chronic kidney disease [N18.4, D63.1] 07/25/2017 Yes    Gastroenteritis, acute [K52.9] 09/07/2018 Yes    Hyperaldosteronism [E26.9] 03/21/2018 Yes    Chronic diastolic congestive heart failure [I50.32] 03/21/2018 Yes    Major depressive disorder with single episode, in full remission [F32.5] 05/16/2017 Yes    CKD (chronic kidney disease), stage IV [N18.4] 01/23/2017 Yes    GERD (gastroesophageal reflux disease) [K21.9]  Yes    Acquired hypothyroidism [E03.9] 05/13/2015 Yes    Coronary artery disease due to calcified coronary lesion [I25.10, I25.84] 10/05/2012 Yes    Resistant hypertension [I10] 10/05/2012 Yes    Other forms of systemic lupus erythematosus [M32.8] 08/17/2012 Yes     Chronic      Problems Resolved During this Admission:       Discharged Condition: good    Disposition: Home or Self Care    Follow Up:  Follow-up Information     Amarilys Johns MD.  Schedule an appointment as soon as possible for a visit in 2 weeks.    Specialty:  Internal Medicine  Contact information:  [address]  [facility] [address]  [phone]                 Patient Instructions:      Ambulatory referral to Outpatient Case Management   Referral Priority: Routine Referral Type: Consultation   Referral Reason: Specialty Services Required   Number of Visits Requested: 1     Diet Adult Regular     Notify your health care provider if you experience any of the following:  temperature >100.4     Notify your health care provider if you experience any of the following:  persistent dizziness, light-headedness, or visual disturbances     Activity as tolerated       Significant Diagnostic Studies: Labs:     Lab Results   Component Value Date    WBC 5.53 09/05/2018    HGB 11.7 (L) 09/05/2018    HCT 35.8 (L) 09/05/2018    MCV 84 09/05/2018     (L) 09/05/2018     BMP  Lab Results   Component Value Date     (L) 09/05/2018    K 3.7 09/05/2018    CL 94 (L) 09/05/2018    CO2 29 09/05/2018    BUN 84 (H) 09/05/2018    CREATININE 3.0 (H) 09/05/2018    CALCIUM 9.1 09/05/2018    ANIONGAP 10 09/05/2018    ESTGFRAFRICA 17.2 (A) 09/05/2018    EGFRNONAA 14.9 (A) 09/05/2018       Radiology:   Imaging Results          X-Ray Chest AP Portable (Final result)  Result time 09/05/18 14:21:27    Final result by Roseline Zhu MD (09/05/18 14:21:27)                 Impression:      Chronic mild enlargement of the cardiac silhouette with calcific atherosclerosis in the thoracic aorta.  No cardiac decompensation or other acute disease identified.  No pneumonia or other source of sepsis detected.      Electronically signed by: Roseline Zhu MD  Date:    09/05/2018  Time:    14:21             Narrative:    EXAMINATION:  XR CHEST AP PORTABLE    CLINICAL HISTORY:  Sepsis;    TECHNIQUE:  Single frontal view of the chest was performed.    COMPARISON:  04/30/2018.    FINDINGS:  Mediastinal structures are  midline.  Cardiac silhouette is mildly enlarged similar to the earlier study.  Calcific plaque is present in the thoracic aorta.    I detect no convincing evidence of pulmonary edema or other manifestation of cardiac decompensation.    The lungs are normally and symmetrically inflated.  I detect no pulmonary disease, pleural fluid, lymph node enlargement, pneumothorax, pneumomediastinum, pneumoperitoneum or significant osseous abnormality.                                  Pending Diagnostic Studies:     None         Medications:  Reconciled Home Medications:      Medication List      CONTINUE taking these medications    aspirin 81 MG EC tablet  Commonly known as:  ECOTRIN  Take 1 tablet (81 mg total) by mouth once daily.     calcipotriene 0.005 % sclp soln  Apply 1 application topically 2 (two) times daily.     carvedilol 25 MG tablet  Commonly known as:  COREG  Take 1 tablet (25 mg total) by mouth 2 (two) times daily.     citalopram 20 MG tablet  Commonly known as:  CELEXA  TAKE 1 AND 1/2  TABLETS (30 MG TOTAL) ONCE DAILY.     clobetasol 0.05 % external solution  Commonly known as:  TEMOVATE  Apply topically 2 (two) times daily.     doxazosin 1 MG tablet  Commonly known as:  CARDURA  Take 1 tablet (1 mg total) by mouth every evening.     ergocalciferol 50,000 unit Cap  Commonly known as:  VITAMIN D2  Take 1 capsule (50,000 Units total) by mouth every 7 days.     famotidine 20 MG tablet  Commonly known as:  PEPCID  Take 1 tablet (20 mg total) by mouth once daily.     furosemide 40 MG tablet  Commonly known as:  LASIX  Take 1 tablet (40 mg total) by mouth daily as needed (for weight gain, shortness of breath).     hydroxychloroquine 200 mg tablet  Commonly known as:  PLAQUENIL  TAKE 1 TABLET TWICE DAILY     isosorbide mononitrate 60 MG 24 hr tablet  Commonly known as:  IMDUR  Take 1 tablet (60 mg total) by mouth once daily.     latanoprost 0.005 % ophthalmic solution  INSTILL 1 DROP INTO BOTH EYES EVERY DAY      levothyroxine 75 MCG tablet  Commonly known as:  SYNTHROID  Take 1 tablet (75 mcg total) by mouth before breakfast.     mometasone 0.1 % ointment  Commonly known as:  ELOCON  Apply topically once daily. To rashes for 14 days     NIFEdipine 60 MG (OSM) 24 hr tablet  Commonly known as:  PROCARDIA-XL  Take 1 tablet (60 mg total) by mouth 2 (two) times daily.     rosuvastatin 10 MG tablet  Commonly known as:  CRESTOR  Take 1 tablet (10 mg total) by mouth every evening.        STOP taking these medications    metOLazone 2.5 MG tablet  Commonly known as:  ZAROXOLYN            Indwelling Lines/Drains at time of discharge:   Lines/Drains/Airways          None          Time spent on the discharge of patient: >35 minutes  Patient was seen and examined on the date of discharge and determined to be suitable for discharge.       Maria Dolores Nixon M.D., M.P.H.  Department of Hospital Medicine  [facility]  (pager) [phone] (spect) [phone]

## 2018-09-07 NOTE — TELEPHONE ENCOUNTER
Please call [facility] and her get her hospital DC appt ASAP. Give her our direct numbers, mine is [phone].    Thanks,  WENDY

## 2018-09-07 NOTE — HOSPITAL COURSE
Ms. Arnold was admitted to Hospital Medicine for management of hypothermia and fatigue. Her clinical picture was most consistent with a recent viral gastroenteritis with subsequent poor oral intake and dehydration.     Her hypothermia improved with rewarming blankets. Telemetry showed no dysrhythmia. Her GI symptoms of vomiting and diarrhea resolved spontaneously. She was given gentle IV fluids for dehydration. Her creatinine was 3.0, at baseline. Given her history of lupus, diastolic heart failure, steroid use, and thyroid disease, she was evaluated for hormonal abnormalities. TSH, electrolytes, and lactate were normal. CPK was mildly elevated. Cortisol stimulation testing showed no adrenal insufficiency. Her cardiac medications were resumed prior to discharge.    On day of discharge, she felt well, with renewed energy and resolution of her weakness and fatigue. She was discharged to home in good condition.

## 2018-09-10 ENCOUNTER — OUTPATIENT CASE MANAGEMENT (OUTPATIENT)
Dept: ADMINISTRATIVE | Facility: OTHER | Age: 73
End: 2018-09-10

## 2018-09-10 LAB
BACTERIA BLD CULT: NORMAL
BACTERIA BLD CULT: NORMAL

## 2018-09-10 NOTE — PROGRESS NOTES
Thank you for the referral. The following patient has been assigned to Nimo Mercado RN with Outpatient Complex Care Management for high risk screening.    Reason for referral: Fatigue    Please contact [facility] at [phone] with any questions.    Thank you,    Christy Hardy    Outpatient Case Management

## 2018-09-11 ENCOUNTER — TELEPHONE (OUTPATIENT)
Dept: INTERNAL MEDICINE | Facility: CLINIC | Age: 73
End: 2018-09-11

## 2018-09-11 ENCOUNTER — HOSPITAL ENCOUNTER (OUTPATIENT)
Dept: RADIOLOGY | Facility: HOSPITAL | Age: 73
Discharge: HOME | End: 2018-09-11
Attending: INTERNAL MEDICINE
Payer: MEDICARE

## 2018-09-11 ENCOUNTER — OFFICE VISIT (OUTPATIENT)
Dept: PRIMARY CARE CLINIC | Facility: CLINIC | Age: 73
End: 2018-09-11
Payer: MEDICARE

## 2018-09-11 ENCOUNTER — PATIENT MESSAGE (OUTPATIENT)
Dept: PRIMARY CARE CLINIC | Facility: CLINIC | Age: 73
End: 2018-09-11

## 2018-09-11 VITALS
SYSTOLIC BLOOD PRESSURE: 160 MMHG | BODY MASS INDEX: 20.21 KG/M2 | HEIGHT: 64 IN | HEART RATE: 63 BPM | DIASTOLIC BLOOD PRESSURE: 66 MMHG | WEIGHT: 118.38 LBS | OXYGEN SATURATION: 98 %

## 2018-09-11 DIAGNOSIS — I35.0 NONRHEUMATIC AORTIC VALVE STENOSIS: ICD-10-CM

## 2018-09-11 DIAGNOSIS — D63.1 ANEMIA OF CHRONIC RENAL FAILURE, STAGE 4 (SEVERE): ICD-10-CM

## 2018-09-11 DIAGNOSIS — Z12.31 BREAST CANCER SCREENING BY MAMMOGRAM: ICD-10-CM

## 2018-09-11 DIAGNOSIS — N18.4 CKD (CHRONIC KIDNEY DISEASE), STAGE IV: ICD-10-CM

## 2018-09-11 DIAGNOSIS — H53.9 VISION CHANGES: ICD-10-CM

## 2018-09-11 DIAGNOSIS — I50.33 ACUTE ON CHRONIC DIASTOLIC CONGESTIVE HEART FAILURE: ICD-10-CM

## 2018-09-11 DIAGNOSIS — M32.19 OTHER SYSTEMIC LUPUS ERYTHEMATOSUS WITH OTHER ORGAN INVOLVEMENT: Chronic | ICD-10-CM

## 2018-09-11 DIAGNOSIS — N18.4 ANEMIA OF CHRONIC RENAL FAILURE, STAGE 4 (SEVERE): ICD-10-CM

## 2018-09-11 PROCEDURE — 1101F PT FALLS ASSESS-DOCD LE1/YR: CPT | Mod: CPTII,S$GLB,, | Performed by: INTERNAL MEDICINE

## 2018-09-11 PROCEDURE — 3078F DIAST BP <80 MM HG: CPT | Mod: CPTII,S$GLB,, | Performed by: INTERNAL MEDICINE

## 2018-09-11 PROCEDURE — 3077F SYST BP >= 140 MM HG: CPT | Mod: CPTII,S$GLB,, | Performed by: INTERNAL MEDICINE

## 2018-09-11 PROCEDURE — 99215 OFFICE O/P EST HI 40 MIN: CPT | Mod: S$GLB,,, | Performed by: INTERNAL MEDICINE

## 2018-09-11 PROCEDURE — 77067 SCR MAMMO BI INCL CAD: CPT | Mod: TC

## 2018-09-11 PROCEDURE — 77063 BREAST TOMOSYNTHESIS BI: CPT | Mod: 26,,, | Performed by: RADIOLOGY

## 2018-09-11 PROCEDURE — 77067 SCR MAMMO BI INCL CAD: CPT | Mod: 26,,, | Performed by: RADIOLOGY

## 2018-09-11 RX ORDER — ROSUVASTATIN CALCIUM 10 MG/1
10 TABLET, COATED ORAL NIGHTLY
Qty: 90 TABLET | Refills: 3 | Status: ON HOLD | OUTPATIENT
Start: 2018-09-11 | End: 2018-10-11 | Stop reason: HOSPADM

## 2018-09-11 NOTE — ASSESSMENT & PLAN NOTE
Grade 2 diastolic failure, systolic HF, aortic stenosis  · Cotninue lasix to 40mg BID with metolazone  · Patient takes PRN with weight gain  · F/U interventional and heart failure

## 2018-09-11 NOTE — PROGRESS NOTES
Primary Care Provider Appointment    Subjective:      Patient ID: Ewelina Arnold is a 72 y.o. female with CHF, lupus, aortic stenosis    Chief Complaint: Hospital Follow Up    Patient with recent hospital admit for infectious symptoms. She had diaphoresis, her glucose was low, was weak. She was admitted OBS and given IVF, antibiotics. Felt better on discharge. Her daily weights and BP trended down prior to admit, indicating that she may have become dehydrated on lasix while also taking prednisone (which was causing weight gain).    She sees the heart failure clinic and interventional cardiology this month to be assess for TAVR. She was previously not eligible for this due to her mod stenosis status. Was prescribed lasix 40 BID, but is taking it PRN and not using metolazone at this tume. Became dehydrated while on lasix during her steroid course, and is concerned about this recurring.    Her GFR was 17.2 on hospital admit. Wants to monitor today.    Her anemia is significantly improved with iron injections. May receive erythropoetin in the future.    She is compliant with pill packs.  Adherence packed for 28 days:     Morning card:  Asprin 81 mg  Carvedlol 25 mg  Citalopram 20 mg (1&1/2 tabs)  Famotidine 20 mg  Hydroxychloroquine 200 mg  Isosorbide Mononitrate ER 60 mg  Levothyroxine 75 mcg  Nifedipine ER Osmotic 60 mg        Evening card:  Carvedilol 25 mg  Doxazosin 1mg  Hydroxychloroquine 200 mg  Nifedipine ER Osmotic 60 mg  Rosuvastatin 10 mg        Furosemide 40mg not packed-pt to take as needed  Pt prefers to get meds from [facility] and brings it to pharmacy.  Irbesartan or hydralazine 100mg d/c     35''        Past Surgical History:   Procedure Laterality Date    BIOPSY-BONE MARROW Left 10/16/2017    Performed by Grant Santillan MD at [facility] OR [address] [place] [address]    CARDIAC CATHETERIZATION  07/27/2011    x1    CHOLECYSTECTOMY  1999    COLON SURGERY  2000 & 2003    partial removal    COLONOSCOPY N/A 4/14/2016     Procedure: COLONOSCOPY;  Surgeon: Jose Steen MD;  [address]  Service: Endoscopy;  Laterality: N/A;  prep 2 days prior light meals only/clear liquid day before  and 4 dulcolax tabs (5 mgs) at noon    COLONOSCOPY N/A 9/14/2017    Procedure: COLONOSCOPY;  Surgeon: Jose Steen MD;  Location: [address]);  Service: Endoscopy;  Laterality: N/A;    COLONOSCOPY N/A 9/14/2017    Performed by [facility] at [facility] (4[facility] FLR)    COLONOSCOPY N/A 4/14/2016    Performed by [facility] at [facility] (4TH FLR)    COLONOSCOPY N/A 4/23/2013    Performed by [facility] MD at [facility] (4TH FLR)    TALIA-TRANSFORAMINAL Left 11/3/2016    Performed by [facility]    ESOPHAGOGASTRODUODENOSCOPY (EGD) N/A 3/16/2017    [facility] ENDO    EYE SURGERY      HAND SURGERY Bilateral 1996 & 1997    due to carpal tunnel     HERNIA REPAIR      HYSTERECTOMY  1983    INJECTION-STEROID-EPIDURAL-TRANSFORAMINAL Left 1/7/2016    Performed by Brett Johnson [facility] at [facility]    Left heart cath Left 12/20/2017    Performed by Chandan Ly MD at [facility] CATH LAB    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    OOPHORECTOMY      removed one    Peripheral Iridotomy both eyes  1994    laser angle correction    THYROIDECTOMY, PARTIAL  2006    to remove two nodules and one-half thyroid       Past Medical History:   Diagnosis Date    Acute hypoxemic respiratory failure 4/28/2018    Acute on chronic diastolic congestive heart failure 11/17/2017    Allergy     Anatomical narrow angle glaucoma     Anemia     States diagnosed about a month ago    Aortic atherosclerosis     Arthritis     Cataract     CHF (congestive heart failure)     Chronic kidney disease     Chronic sciatica of left side     Right lower back pain due to sciatica    Coronary artery disease     Depression     Dry eyes     GERD (gastroesophageal  "reflux disease)     History of colon cancer     Hyperaldosteronism     Hyperlipidemia     Hypertension     Hypothyroidism     Keloid cicatrix     Left ventricular diastolic dysfunction with preserved systolic function     Lupus (systemic lupus erythematosus) 8/17/2012    Malnutrition 5/16/2017    Osteoarthritis of cervical spine 8/17/2012    Osteopenia     PAD (peripheral artery disease)     FRAN (renal artery stenosis)        Review of Systems   Constitutional: Positive for activity change. Negative for appetite change.   Respiratory: Negative for cough and shortness of breath.    Cardiovascular: Negative for leg swelling.   Genitourinary: Negative for difficulty urinating and dysuria.   Skin: Positive for wound.   Neurological: Positive for dizziness. Negative for light-headedness.   Hematological: Bruises/bleeds easily.   Psychiatric/Behavioral: Negative for agitation, behavioral problems, confusion, decreased concentration and dysphoric mood.       Objective:   BP (!) 160/66 (BP Location: Left arm, Patient Position: Sitting, BP Method: Medium (Manual))   Pulse 63   Ht 5' 4" (1.626 m)   Wt 53.7 kg (118 lb 6.2 oz)   LMP  (LMP Unknown) Comment: pt had taken bp meds less than an hour ago  SpO2 98%   BMI 20.32 kg/m²     Physical Exam   Constitutional: She is oriented to person, place, and time. She appears well-developed and well-nourished.   HENT:   Head: Normocephalic and atraumatic.   Neck: Normal range of motion.   Musculoskeletal: She exhibits deformity. She exhibits no edema.   Resolution of LLE nodule   Neurological: She is alert and oriented to person, place, and time.   Psychiatric: She has a normal mood and affect. Her behavior is normal. Thought content normal.   Vitals reviewed.      Lab Results   Component Value Date    WBC 5.53 09/05/2018    HGB 11.7 (L) 09/05/2018    HCT 35.8 (L) 09/05/2018     (L) 09/05/2018    CHOL 181 08/28/2018    TRIG 54 08/28/2018    HDL 66 08/28/2018    " ALT 15 09/05/2018    AST 42 (H) 09/05/2018     (L) 09/05/2018    K 3.7 09/05/2018    CL 94 (L) 09/05/2018    CREATININE 3.0 (H) 09/05/2018    BUN 84 (H) 09/05/2018    CO2 29 09/05/2018    TSH 0.954 09/05/2018    INR 1.0 09/05/2018    GLUF 79 12/02/2011    HGBA1C 5.7 01/24/2017       BP LOG      WEIGHT LOG      Assessment:   72 y.o. female with multiple co-morbid illnesses here to continue work-up of chronic issues notably CHF, MVR, lupus, aortic stenosis     Plan:     Problem List Items Addressed This Visit        Ophtho    Vision changes     Hallucinations, not ocular in origin (seen by ophthalmology), is on steroids for lupus flair  · Refer to Vascular Neurology  · Has appt next month  · Monitor and wean off steroids            Cardiac/Vascular    Nonrheumatic aortic valve stenosis     Aortic regurg and stenosis on recent echo, not eligible for TAVR (will not improve aortic valve function)  · F/U heart failure  · Appts with Dr Isabel to eval for TAVR  · Appt with Dr Burdick for heart failure         Relevant Orders    Ambulatory referral to Palliative Care - [facility]    Acute on chronic diastolic congestive heart failure     Grade 2 diastolic failure, systolic HF, aortic stenosis  · Cotninue lasix to 40mg BID with metolazone  · Patient takes PRN with weight gain  · F/U interventional and heart failure         Relevant Medications    rosuvastatin (CRESTOR) 10 MG tablet    Other Relevant Orders    Ambulatory referral to Palliative Care [facility]       Renal/    CKD (chronic kidney disease), stage IV     Kidney donation history, 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 Biopsy showed cutaneous involvment  · Continue plaquenil  · Completed prednisone for erythema nodosum  · F/U Rheum          Relevant Orders    Ambulatory referral to Palliative Care - [facility]       Oncology    Anemia of chronic renal failure, stage 4 (severe)     Followed by Nephrology, PRN iron infusions  · F/U Nephrology   PCP messaged Dr Epperson for appt   · Iron infusion PRN  · May need erythropoetin         Relevant Orders    CBC auto differential    Ambulatory referral to Palliative Care - [facility]          Health Maintenance       Date Due Completion Date    High Dose Statin 12/29/1966 ---TODAY    Influenza Vaccine 08/01/2018 10/10/2017- TODAY    Override on 10/9/2015: Done    DEXA SCAN 01/05/2019 1/5/2016    Mammogram 08/23/2019 8/23/2017    Colonoscopy 09/14/2022 9/14/2017    Lipid Panel 08/28/2023 8/28/2018    TETANUS VACCINE 06/29/2026 6/29/2016          Follow-up in about 6 weeks (around 10/23/2018) for already has appt. . One hour spent with this patient today, half of that in counseling.    Amarilys Johns MD/MPH  Internal Medicine  [facility]  [phone]

## 2018-09-11 NOTE — ASSESSMENT & PLAN NOTE
Hallucinations, not ocular in origin (seen by ophthalmology), is on steroids for lupus flair  · Refer to Vascular Neurology  · Has appt next month  · Monitor and wean off steroids

## 2018-09-11 NOTE — TELEPHONE ENCOUNTER
Dr Epperson- Ms Arnold Hg is trending down again. Should she receive another iron infusion? Your last note mentioned started erythropoietin.    Thanks,  WENDY (PCP)

## 2018-09-11 NOTE — ASSESSMENT & PLAN NOTE
Diagnosed in 1990s, pos CARYN, SSB. Biopsy showed cutaneous involvment  · Continue plaquenil  · Completed prednisone for erythema nodosum  · F/U Rheum

## 2018-09-11 NOTE — PATIENT INSTRUCTIONS
TODAY:  - flu vaccine  - labs  - Palliative Care will visit you in the home  - will see heart failure doctor (Dr Burdick) and Interventional about your valve (Dr Isabel)  - will see vascular neurology about your vision changes   - in the future we have to be careful about weight gain on steroids, and not overusing diuretics during that time

## 2018-09-12 ENCOUNTER — PES CALL (OUTPATIENT)
Dept: ADMINISTRATIVE | Facility: CLINIC | Age: 73
End: 2018-09-12

## 2018-09-12 ENCOUNTER — IMMUNIZATION (OUTPATIENT)
Dept: INTERNAL MEDICINE | Facility: CLINIC | Age: 73
End: 2018-09-12
Payer: MEDICARE

## 2018-09-12 ENCOUNTER — TELEPHONE (OUTPATIENT)
Dept: INTERNAL MEDICINE | Facility: CLINIC | Age: 73
End: 2018-09-12

## 2018-09-12 PROCEDURE — 90662 IIV NO PRSV INCREASED AG IM: CPT | Mod: PBBFAC

## 2018-09-12 NOTE — TELEPHONE ENCOUNTER
Please let patient know that her blood count dropped on labs. I messaged her kidney doctor to see if she should get another iron infusion or start the erythropoeitin. I'll let you know what they say.    Thanks,  KJ

## 2018-09-14 ENCOUNTER — OFFICE VISIT (OUTPATIENT)
Dept: NEUROLOGY | Facility: CLINIC | Age: 73
End: 2018-09-14
Payer: MEDICARE

## 2018-09-14 VITALS
WEIGHT: 114 LBS | HEIGHT: 64 IN | DIASTOLIC BLOOD PRESSURE: 54 MMHG | HEART RATE: 51 BPM | BODY MASS INDEX: 19.46 KG/M2 | SYSTOLIC BLOOD PRESSURE: 135 MMHG

## 2018-09-14 DIAGNOSIS — R44.3 HALLUCINATIONS: Primary | ICD-10-CM

## 2018-09-14 PROCEDURE — 99215 OFFICE O/P EST HI 40 MIN: CPT | Mod: PBBFAC | Performed by: PSYCHIATRY & NEUROLOGY

## 2018-09-14 PROCEDURE — 3078F DIAST BP <80 MM HG: CPT | Mod: CPTII,,, | Performed by: PSYCHIATRY & NEUROLOGY

## 2018-09-14 PROCEDURE — 1101F PT FALLS ASSESS-DOCD LE1/YR: CPT | Mod: CPTII,,, | Performed by: PSYCHIATRY & NEUROLOGY

## 2018-09-14 PROCEDURE — 3075F SYST BP GE 130 - 139MM HG: CPT | Mod: CPTII,,, | Performed by: PSYCHIATRY & NEUROLOGY

## 2018-09-14 PROCEDURE — 99999 PR PBB SHADOW E&M-EST. PATIENT-LVL V: CPT | Mod: PBBFAC,,, | Performed by: PSYCHIATRY & NEUROLOGY

## 2018-09-14 PROCEDURE — 99499 UNLISTED E&M SERVICE: CPT | Mod: HCNC,S$GLB,, | Performed by: PSYCHIATRY & NEUROLOGY

## 2018-09-14 PROCEDURE — 99203 OFFICE O/P NEW LOW 30 MIN: CPT | Mod: S$PBB,,, | Performed by: PSYCHIATRY & NEUROLOGY

## 2018-09-14 RX ORDER — BUTALBITAL, ASPIRIN, AND CAFFEINE 325; 50; 40 MG/1; MG/1; MG/1
1 CAPSULE ORAL
Status: ON HOLD | COMMUNITY
End: 2018-10-11 | Stop reason: HOSPADM

## 2018-09-14 RX ORDER — ATORVASTATIN CALCIUM 80 MG/1
80 TABLET, FILM COATED ORAL
Status: ON HOLD | COMMUNITY
End: 2018-10-01

## 2018-09-14 RX ORDER — DICYCLOMINE HYDROCHLORIDE 10 MG/1
10 CAPSULE ORAL
Status: ON HOLD | COMMUNITY
End: 2018-10-11 | Stop reason: HOSPADM

## 2018-09-16 ENCOUNTER — NURSE TRIAGE (OUTPATIENT)
Dept: ADMINISTRATIVE | Facility: CLINIC | Age: 73
End: 2018-09-16

## 2018-09-16 NOTE — TELEPHONE ENCOUNTER
Reason for Disposition   [1] MODERATE diarrhea (e.g., 4-6 times / day more than normal) AND [2] age > 70 years    Protocols used:  DIARRHEA-A-    Daughter calling regarding Pt having diarrhea.  Diarrhea has stopped due to Pt taking medication.  Now Daughter and Pt wants to know what can Pt do for rehydrating.  Pt is already drinking Gatorade.  Care advice given.  Daughter and Pt verbalized understanding.

## 2018-09-16 NOTE — PROGRESS NOTES
"  Ewelina Arnold is a 72 y.o. year old female that  presents for evaluation of visual disturbances  Chief Complaint   Patient presents with    Blurred Vision   .     HPI:  Ms Arnold has HTN, HLD, CAD,  chronic diastolic CHF, CKD-4, PAD,  history of colon cancer, SLE, and depression.  She comes in complaining that since January she has been experiencing daily visual disturbances that she describes as " seen big black circles,  white lights with something dangling inside, plants and trees rising up from the floor and side of my bed, a dragon flying, ". Further, she reports other episodes in which she saw " like everything is wet in my room as well seen " a person standing with a big black sheet and then disappeared".  Stated that these visual phenomena mostly occur when she open her eyes in the morning.  Ms Arnold in on Plaquenil and said that she was evaluated by ophthalmology twice and no eye pathology to explain her symptoms was found.  Denies having memory impairment.   No dream enacting behavior, fluctuating cognition, extrapyramidal or autonomic symptoms.  No personality or mood changes.  Denies HA, vertigo, double vision, falls, bladder impairment, focal weakness or numbness, slurred speech, language dysfunction, or visual loss.  No TBI, stroke, ETOH use, or new medications.    Past Medical History:   Diagnosis Date    Acute hypoxemic respiratory failure 4/28/2018    Acute on chronic diastolic congestive heart failure 11/17/2017    Allergy     Anatomical narrow angle glaucoma     Anemia     States diagnosed about a month ago    Aortic atherosclerosis     Arthritis     Cataract     CHF (congestive heart failure)     Chronic kidney disease     Chronic sciatica of left side     Right lower back pain due to sciatica    Coronary artery disease     Depression     Dry eyes     GERD (gastroesophageal reflux disease)     History of colon cancer     Hyperaldosteronism     Hyperlipidemia     Hypertension  "    Hypothyroidism     Keloid cicatrix     Left ventricular diastolic dysfunction with preserved systolic function     Lupus (systemic lupus erythematosus) 2012    Malnutrition 2017    Osteoarthritis of cervical spine 2012    Osteopenia     PAD (peripheral artery disease)     FRAN (renal artery stenosis)      Social History     Socioeconomic History    Marital status: Single     Spouse name: Not on file    Number of children: Not on file    Years of education: Not on file    Highest education level: Not on file   Social Needs    Financial resource strain: Not on file    Food insecurity - worry: Not on file    Food insecurity - inability: Not on file    Transportation needs - medical: Not on file    Transportation needs - non-medical: Not on file   Occupational History     Employer: Retired    Tobacco Use    Smoking status: Former Smoker     Packs/day: 1.50     Years: 30.00     Pack years: 45.00     Types: Cigarettes     Start date:      Last attempt to quit: 3/13/1985     Years since quittin.5    Smokeless tobacco: Never Used   Substance and Sexual Activity    Alcohol use: No    Drug use: No    Sexual activity: Not Currently     Partners: Male     Birth control/protection: Abstinence   Other Topics Concern    Are you pregnant or think you may be? No    Breast-feeding No   Social History Narrative    Not on file     Past Surgical History:   Procedure Laterality Date    BIOPSY-BONE MARROW Left 10/16/2017    Performed by [facility]an [facility] at [facility] OR [address] [place] [address]    CARDIAC CATHETERIZATION  07/27/2011    x1    CHOLECYSTECTOMY      COLON SURGERY   &     partial removal    COLONOSCOPY N/A 2016    Procedure: COLONOSCOPY;  Surgeon: Jose Steen MD;  Location: [facility] ([address] [place] [address]);  Service: Endoscopy;  Laterality: N/A;  prep 2 days prior light meals only/clear liquid day before  and 4 dulcolax tabs (5 mgs) at noon    COLONOSCOPY N/A 2017     Procedure: COLONOSCOPY;  Surgeon: Jose Steen MD;  Location: [facility] (4TH FLR);  Service: Endoscopy;  Laterality: N/A;    COLONOSCOPY N/A 9/14/2017    Performed by [facility] at [facility] ENDO (4TH FLR)    COLONOSCOPY N/A 4/14/2016    Performed by [facility] MD at [facility] ENDO (4TH FLR)    COLONOSCOPY N/A 4/23/2013    Performed by [facility] at [facility] (4TH FLR)    TALIA-TRANSFORAMINAL Left 11/3/2016    [facility]    ESOPHAGOGASTRODUODENOSCOPY (EGD) N/A 3/16/2017    Per[facility] by [facility] at [facility] ENDO    EYE SURGERY      HAND SURGERY Bilateral 1996 & 1997    due to carpal tunnel     HERNIA REPAIR      HYSTERECTOMY  1983    INJECTION-STEROID-EPIDURAL-TRANSFORAMINAL Left 1/7/2016    [facility]    Left heart cath Left 12/20/2017    Performed by Chandan Ly [facility] at [facility] CATH LAB    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    OOPHORECTOMY      removed one    Peripheral Iridotomy both eyes  1994    laser angle correction    THYROIDECTOMY, PARTIAL  2006    to remove two nodules and one-half thyroid     Family History   Problem Relation Age of Onset    Heart attack Mother     Hypertension Mother     Heart disease Father     Hypertension Father     Diabetes Maternal Grandmother     Glaucoma Maternal Grandmother     Hypertension Sister     Kidney disease Brother     Kidney failure Brother         received donated kidney from sister    No Known Problems Daughter     Diabetes Son     Hypertension Brother     Diabetes Maternal Aunt     No Known Problems Maternal Grandfather     No Known Problems Paternal Grandmother     No Known Problems Paternal Grandfather     Acne Neg Hx     Eczema Neg Hx     Lupus Neg Hx     Psoriasis Neg Hx     Melanoma Neg Hx     Amblyopia Neg Hx     Blindness Neg Hx     Cataracts Neg Hx     Macular degeneration Neg Hx     Retinal detachment Neg  "Hx     Strabismus Neg Hx            Review of Systems  General ROS: negative for chills, fever or weight loss  Psychological ROS: negative for hallucination, depression or suicidal ideation  Ophthalmic ROS: negative for blurry vision, photophobia or eye pain  ENT ROS: negative for epistaxis, sore throat or rhinorrhea  Respiratory ROS: no cough, shortness of breath, or wheezing  Cardiovascular ROS: no chest pain or dyspnea on exertion  Gastrointestinal ROS: no abdominal pain, change in bowel habits, or black/ bloody stools  Genito-Urinary ROS: no dysuria, trouble voiding, or hematuria  Musculoskeletal ROS: negative for gait disturbance or muscular weakness  Neurological ROS: no syncope or seizures; no ataxia  Dermatological ROS: negative for pruritis, rash and jaundice      Physical Exam:  BP (!) 135/54   Pulse (!) 51   Ht 5' 4" (1.626 m)   Wt 51.7 kg (114 lb)   LMP  (LMP Unknown) Comment: pt had taken bp meds less than an hour ago  BMI 19.57 kg/m²   General appearance: alert, cooperative, no distress  Constitutional:Oriented to person, place, and time.appears well-developed and well-nourished.   HEENT: Normocephalic, atraumatic, neck symmetrical, no nasal discharge   Eyes: conjunctivae/corneas clear, PERRL, EOM's intact  Lungs: clear to auscultation bilaterally, no dullness to percussion bilaterally  Heart: regular rate and rhythm without rub; no displacement of the PMI   Abdomen: soft, non-tender; bowel sounds normoactive; no organomegaly  Extremities: extremities symmetric; no clubbing, cyanosis, or edema  Integument: Skin color, texture, turgor normal; no rashes; hair distrubution normal  Neurologic:   Mental status: alert and awake, oriented x 4, comprehension, naming, and repetition intact. No right to left confusion. Performs serial 7's without difficulty .No dysarthria.  CN 2-12: pupils 4 mm bilaterally, reactive to light. Fundi without papilledema. Visual fields full to confrontation. EOM full without " nystagmus. Face sensation normal in all distributions. Face symmetric. Hearing grossly intact. Palate elevates well. Tongue midline without atrophy or fasciculations.  Motor: 5/5 all over  Sensory: intact in all modalities.  DTR's: 2+ all over.  Plantars: no tested.  Coordination: finger to nose and heel-knee-shin intact.  Gait: no ataxia or bradykinesia  Psychiatric: no pressured speech; normal affect; no evidence of impaired cognition     LABS:    Complete Blood Count  Lab Results   Component Value Date    RBC 3.25 (L) 09/11/2018    HGB 8.8 (L) 09/11/2018    HCT 27.5 (L) 09/11/2018    MCV 85 09/11/2018    MCH 27.1 09/11/2018    MCHC 32.0 09/11/2018    RDW 13.6 09/11/2018     (L) 09/11/2018    MPV 9.1 (L) 09/11/2018    GRAN 1.7 (L) 09/11/2018    GRAN 54.4 09/11/2018    LYMPH 1.0 09/11/2018    LYMPH 31.6 09/11/2018    MONO 0.4 09/11/2018    MONO 13.4 09/11/2018    EOS 0.0 09/11/2018    BASO 0.01 09/11/2018    EOSINOPHIL 0.3 09/11/2018    BASOPHIL 0.3 09/11/2018    DIFFMETHOD Automated 09/11/2018       Comprehensive Metabolic Panel  Lab Results   Component Value Date    GLU 98 09/11/2018    BUN 65 (H) 09/11/2018    CREATININE 3.8 (H) 09/11/2018     (L) 09/11/2018    K 4.1 09/11/2018    CL 99 09/11/2018    PROT 6.9 09/05/2018    ALBUMIN 3.0 (L) 09/05/2018    BILITOT 0.4 09/05/2018    AST 42 (H) 09/05/2018    ALKPHOS 69 09/05/2018    CO2 26 09/11/2018    ALT 15 09/05/2018    ANIONGAP 9 09/11/2018    EGFRNONAA 11 (A) 09/11/2018    ESTGFRAFRICA 13 (A) 09/11/2018       TSH  Lab Results   Component Value Date    TSH 0.954 09/05/2018         Assessment: 71 y/o with  HTN, HLD, CAD,  chronic diastolic CHF, CKD-4, PAD,  history of colon cancer, SLE, depression, and visual hallucinations and delusions with preserved sensorium as described above.  Non focal neuro-exam.  Broad differential that of course includes DLB.        ICD-10-CM ICD-9-CM    1. Hallucinations R44.3 780.1 MRI Brain Without Contrast       EEG,w/awake & asleep record     The encounter diagnosis was Hallucinations.      Plan:  1) Visual hallucinations and delusions, unclear etiology thus far: MRI brain, EEG.  May ultimately need brain SPECT and full neurocognitive testing.  2) HTN, as per PCP  3) HLD, on statin  4) CAD, managed by cardiology  5) CHF, as per cardiology  6) CKD-4, follow by renal  7) History colon cancer  8) Depression, on celexa          Orders Placed This Encounter   Procedures    MRI Brain Without Contrast    EEG,w/awake & asleep record           Hubert James MD

## 2018-09-18 ENCOUNTER — OUTPATIENT CASE MANAGEMENT (OUTPATIENT)
Dept: ADMINISTRATIVE | Facility: OTHER | Age: 73
End: 2018-09-18

## 2018-09-19 ENCOUNTER — DOCUMENTATION ONLY (OUTPATIENT)
Dept: PHARMACY | Facility: CLINIC | Age: 73
End: 2018-09-19

## 2018-09-19 ENCOUNTER — TELEPHONE (OUTPATIENT)
Dept: NEUROLOGY | Facility: CLINIC | Age: 73
End: 2018-09-19

## 2018-09-19 ENCOUNTER — TELEPHONE (OUTPATIENT)
Dept: CARDIOLOGY | Facility: CLINIC | Age: 73
End: 2018-09-19

## 2018-09-19 NOTE — PROGRESS NOTES
Adherence packed for 28 days using [facility] monthly packs:     Morning card:  Asprin 81 mg  Carvedlol 25 mg  Citalopram 20 mg (1&1/2 tabs)  Famotidine 20 mg  Hydroxychloroquine 200 mg  Isosorbide Mononitrate ER 60 mg  Levothyroxine 75 mcg  Nifedipine ER Osmotic 60 mg        Evening card:  Carvedilol 25 mg  Doxazosin 1mg  Hydroxychloroquine 200 mg  Nifedipine ER Osmotic 60 mg  Rosuvastatin 10 mg        Furosemide 40mg not packed-pt to take as needed  Pt prefers to get meds from [facility] and brings it to pharmacy.  Nifedipine ER fill at [facility] through [facility]   Irbesartan or hydralazine 100mg d/c     35''

## 2018-09-19 NOTE — TELEPHONE ENCOUNTER
----- Message from Kush Castellano sent at 9/19/2018  8:16 AM CDT -----  Needs Advice    Reason for call: Pt is calling to reschedule EEG appt to 9:15 AM tomorrow, due to needing time to get from imaging center back to main. Pt is also asking if transportation will be provided from [facility] to imagining center and back to [facility].        Communication Preference: [phone]    Additional Information:

## 2018-09-19 NOTE — TELEPHONE ENCOUNTER
Returned patient's call to schedule an appt for patient as per message request. Patient stated she did not request or want an appt with general cardiology because she already has an appt with Dr. Isabel on 9/28 and an appt with Dr. LYNN Burdick in November. She said she does not need to have 3 cardiologists.

## 2018-09-19 NOTE — PROGRESS NOTES
Summary  Received referral for High Risk screen and discharge risk.  Call to pt and Verified pt identity and discussed outpatient case management services.  Advised on services offered and advantages and patient consented to these services. Pt states she has benefited from this service in the past and definitely wants to participate again.  Patient verbalized knowledge that this is a free service and would last approximately 30 to 60 days.  If at any time you wish to disenroll you are welcome to do so.    73 yo female with recent hospitalization for generalized weakness, vomiting and diarrhea.  Pt states she is not sure if it was something she ate or just a stomach bug.  She indicates she was on prednisone for the lupus and it messes with her immune system.  She is feeling much better.    She states she opted out of the digital hypertension program because the blood pressure cuffs was too big and they could not get a pediatric.  Advised that I could follow up on that and she declined and states she is able to take her blood pressure and she documents on tablet and brings to  md appt.  The same is true with weight.  She indicates her weight is 114.  This is her normal.  Minimal to no leg swelling noted  Pt states that since she has started with the pill packaging from the retail pharmacy her at [facility] that has relieved a lot of stress on her with medication management.  Pt indicates that she now lives alone her grandson has moved to his own house he built.  Advised on use of life alert and she declines at this time.  She states she keeps her cell phone with her at all times.      She still drives self to store etc  Pt states she would like more information on low sodium diet and she would also like education on her lupus diagnosis.      Pt is utilizing [facility] but is not using the OTC quartile allowance.  She is requesting the catalog with their contact information.    Intervetnions  Nursing  screen,assessment and medication review completed today telephonically.  Advised pt that the after visit summary has most recent list of medications pt is on.   Please review for accuracy and keep a copy after each md visit for your records    Welcome statement and contact information as well as krames information sent via pt portal/mail.  Pt advised to look in my [facility]/mail for communication  Plan of care developed with pt/CG input.  Short and long term goals reviewed with patient and patient verbalized understanding and agreement to these goals  Reviewed [employer] pharmacy benefits.  Plan  Follow up on receipt of education material  Review low sodium diet    Follow up on receipt of [employer] OTC catalog

## 2018-09-19 NOTE — TELEPHONE ENCOUNTER
Spoke with patient and she wants to keep appointments. Patient was calling to see about time in between test and where they were located.

## 2018-09-20 ENCOUNTER — HOSPITAL ENCOUNTER (OUTPATIENT)
Dept: RADIOLOGY | Facility: HOSPITAL | Age: 73
Discharge: HOME | End: 2018-09-20
Attending: PSYCHIATRY & NEUROLOGY
Payer: MEDICARE

## 2018-09-20 ENCOUNTER — HOSPITAL ENCOUNTER (OUTPATIENT)
Dept: NEUROLOGY | Facility: CLINIC | Age: 73
Discharge: HOME | End: 2018-09-20
Payer: MEDICARE

## 2018-09-20 DIAGNOSIS — R44.3 HALLUCINATIONS: ICD-10-CM

## 2018-09-20 PROCEDURE — 70551 MRI BRAIN STEM W/O DYE: CPT | Mod: TC

## 2018-09-20 PROCEDURE — 70551 MRI BRAIN STEM W/O DYE: CPT | Mod: 26,,, | Performed by: RADIOLOGY

## 2018-09-20 PROCEDURE — 95819 EEG AWAKE AND ASLEEP: CPT | Mod: PBBFAC | Performed by: PSYCHIATRY & NEUROLOGY

## 2018-09-20 PROCEDURE — 95819 PR EEG,W/AWAKE & ASLEEP RECORD: ICD-10-PCS | Mod: 26,S$PBB,, | Performed by: PSYCHIATRY & NEUROLOGY

## 2018-09-20 PROCEDURE — 95819 EEG AWAKE AND ASLEEP: CPT | Mod: 26,S$PBB,, | Performed by: PSYCHIATRY & NEUROLOGY

## 2018-09-20 NOTE — PATIENT INSTRUCTIONS
Low-Salt Diet  This diet removes foods that are high in salt. It also limits the amount of salt you use when cooking. It is most often used for people with high blood pressure, edema (fluid retention), and kidney, liver, or heart disease.  Table salt contains the mineral sodium. Your body needs sodium to work normally. But too much sodium can make your health problems worse. Your healthcare provider is recommending a low-salt (also called low-sodium) diet for you. Your total daily allowance of salt is 1,500 to 2,300 milligrams (mg). It is less than 1 teaspoon of table salt. This means you can have only about 500 to 700 mg of sodium at each meal. People with certain health problems should limit salt intake to the lower end of the recommended range.    When you cook, dont add much salt. If you can cook without using salt, even better. Dont add salt to your food at the table.  When shopping, read food labels. Salt is often called sodium on the label. Choose foods that are salt-free, low salt, or very low salt. Note that foods with reduced salt may not lower your salt intake enough.    Beans, potatoes, and pasta  Ok: Dry beans, split peas, lentils, potatoes, rice, macaroni, pasta, spaghetti without added salt  Avoid: Potato chips, tortilla chips, and similar products  Breads and cereals  Ok: Low-sodium breads, rolls, cereals, and cakes; low-salt crackers, matzo crackers  Avoid: Salted crackers, pretzels, popcorn, Kazakh toast, pancakes, muffins  Dairy  Ok: Milk, chocolate milk, hot chocolate mix, low-salt cheeses, and yogurt  Avoid: Processed cheese and cheese spreads; Roquefort, Camembert, and cottage cheese; buttermilk, instant breakfast drink  Desserts  Ok: Ice cream, frozen yogurt, juice bars, gelatin, cookies and pies, sugar, honey, jelly, hard candy  Avoid: Most pies, cakes and cookies prepared or processed with salt; instant pudding  Drinks  Ok: Tea, coffee, fizzy (carbonated) drinks, juices  Avoid: Flavored  coffees, electrolyte replacement drinks, sports drinks  Meats  Ok: All fresh meat, fish, poultry, low-salt tuna, eggs, egg substitute  Avoid: Smoked, pickled, brine-cured, or salted meats and fish. This includes chiu, chipped beef, corned beef, hot dogs, deli meats, ham, kosher meats, salt pork, sausage, canned tuna, salted codfish, smoked salmon, herring, sardines, or anchovies.  Seasonings and spices  Ok: Most seasonings are okay. Good substitutes for salt include: fresh herb blends, hot sauce, lemon, garlic, cruz, vinegar, dry mustard, parsley, cilantro, horseradish, tomato paste, regular margarine, mayonnaise, unsalted butter, cream cheese, vegetable oil, cream, low-salt salad dressing and gravy.  Avoid: Regular ketchup, relishes, pickles, soy sauce, teriyaki sauce, Worcestershire sauce, BBQ sauce, tartar sauce, meat tenderizer, chili sauce, regular gravy, regular salad dressing, salted butter  Soups  Ok: Low-salt soups and broths made with allowed foods  Avoid: Bouillon cubes, soups with smoked or salted meats, regular soup and broth  Vegetables  Ok: Most vegetables are okay; also low-salt tomato and vegetable juices  Avoid: Sauerkraut and other brine-soaked vegetables; pickles and other pickled vegetables; tomato juice, olives  Date Last Reviewed: 8/1/2016 © 2000-2017 [employer]. [address] [place] [address]. All rights reserved. This information is not intended as a substitute for professional medical care. Always follow your healthcare professional's instructions.        Discharge Instructions: Eating a Low-Salt Diet  Your health care provider has prescribed a low-salt diet for you. Most people with heart problems need to eat less salt, which is full of sodium. Too much sodium is linked to high blood pressure, which is linked to a greater risk of heart disease, stroke, blindness, and kidney problems.  Home care    Learn ways to cut back on salt (sodium):  · Eat less frozen,  canned, dried, packaged, and fast foods. These often contain high amounts of sodium.  · Season foods with herbs instead of salt when you cook.  · Season with flavorings such as pepper, lemon, garlic, and onion.  · Dont add salt to your food at the table.  · Sprinkle salt-free herbal blends on meats and vegetables.  Learn to read food labels carefully:  · Look for the total amount of sodium per serving.  · Look for foods labeled low sodium, reduced sodium, or no added salt.  · Beware. Salt goes by many names. Cut down on foods with these words (all forms of salt) listed as ingredients:  ¨ Salt  ¨ Sodium  ¨ Soy sauce  ¨ Baking soda  ¨ Baking powder  ¨ MSG  ¨ Monosodium  ¨ Na (the chemical symbol for sodium)  Other ideas:  · Use more fresh food. Buy more fruits and vegetables.  · Select lean meats, fish, and poultry.  · Find a cookbook with low-salt recipes. Youll find ideas for tasty meals that are healthy for your heart.  ·   When eating out, ask questions about the menu. Tell the  you're on a low-salt diet.  ¨ If you order fish, chicken, beef, or pork, ask the  to have it broiled, baked, poached, or grilled without salt, butter, or breading.  ¨ Choose plain steamed rice, boiled noodles, and baked or boiled potatoes. Top potatoes with chives and a little sour cream instead of butter.  · Avoid antacids that are high in salt. Check the label before you buy.  Follow-up  Make a follow-up appointment with a nutritionist as directed by our staff.  Date Last Reviewed: 6/20/2015  © 8966-8345 [email or web address]. [address] [place] [address], [place] [address]. All rights reserved. This information is not intended as a substitute for professional medical care. Always follow your healthcare professional's instructions.        Low-Salt Choices  Eating salt (sodium) can make your body retain too much water. Excess water makes your heart work harder. Canned, packaged, and frozen foods are easy to prepare, but they  are often high in sodium. Here are some ideas for low-salt foods you can easily prepare yourself.    For breakfast  · Fruit or 100% fruit juice  · Whole-wheat bread or an English muffin. Compare sodium content on labels.  · Low-fat milk or yogurt  · Unsalted eggs  · Shredded wheat  · Corn tortillas  · Unsalted steamed rice  · Regular (not instant) hot cereal, made without salt  Stay away from:  · Sausage, chiu, and ham  · Flour tortillas  · Packaged muffins, pancakes, and biscuits  · Instant hot cereals  · Cottage cheese  For lunch and dinner  · Fresh fish, chicken, turkey, or meat--baked, broiled, or roasted without salt  · Dry beans, cooked without salt  · Tofu, stir-fried without salt  · Unsalted fresh fruit and vegetables, or frozen or canned fruit and vegetables with no added salt  Stay away from:  · Lunch or deli meat that is cured or smoked  · Cheese  · Tomato juice and catsup  · Canned vegetables, soups, and fish not labeled as no-salt-added or reduced sodium  · Packaged gravies and sauces  · Olives, pickles, and relish  · Bottled salad dressings  For snacks and desserts  · Yogurt  · Unsalted, air popped popcorn  · Unsalted nuts or seeds  Stay away from:  · Pies and cakes  · Packaged dessert mixes  · Pizza  · Canned and packaged puddings  · Pretzels, chips, crackers, and nuts--unless the label says unsalted  Date Last Reviewed: 6/17/2015  © 2205-4305 [employer]. [address] [place] [address]. All rights reserved. This information is not intended as a substitute for professional medical care. Always follow your healthcare professional's instructions.        Tips for Using Less Salt    Most people with heart problems need to eat less salt (sodium). Reducing the amount of salt you eat may help control your blood pressure. The higher your blood pressure, the greater your risk for heart disease, stroke, blindness, and kidney problems.  At the store  · Make low-salt choices by reading  labels carefully. Look for the total amount of sodium per serving.  · Use more fresh food. Buy more fruits and vegetables. Select lean meats, fish, and poultry.  · Use fewer frozen, canned, and packaged foods which often contain a lot of sodium.  · Use plain frozen vegetables without sauces or toppings. These products are often low- or no-sodium.  · Opt for reduced-sodium or no-salt-added versions of canned vegetables and soups.  In the kitchen  · Don't add salt to food when you're cooking. Season with flavorings such as onion, garlic, pepper, salt-free herbal blends, and lemon or lime juice.  · Use a cookbook containing low-salt recipes. It can give you ideas for tasty meals that are healthy for your heart.  · Sprinkle salt-free herbal blends on vegetables and meat.  · Drain and rinse canned foods, such as canned beans and vegetables, before cooking or eating.  Eating out  · Tell the  you're on a low-salt diet. Ask questions about the menu.  · Order fish, chicken, and meat broiled, baked, poached, or grilled without salt, butter, or breading.  · Use lemon, pepper, and salt-free herb mixes to add flavor.  · Choose plain steamed rice, boiled noodles, and baked or boiled potatoes. Top potatoes with chives and a little sour cream.     Beware! Salt goes by many other names. Limit foods with these words listed as ingredients: salt, sodium, soy sauce, baking soda, baking powder, MSG, monosodium, Na (the chemical symbol for sodium). Some antacids are also high in salt.   Date Last Reviewed: 6/19/2015  © 0763-7343 [employer]. [address] [place] [address], [place] [address]. All rights reserved. This information is not intended as a substitute for professional medical care. Always follow your healthcare professional's instructions.        Lupus (Systemic Lupus Erythematosus, SLE)  Lupus is a chronic (long-term) disease. It causes inflammation in the body. It mainly affects the joints, skin, and muscles. Lupus  can affect almost any part of the body, and other common sites affected by lupus include the kidneys, blood cells, lungs, brain, nerves, intestines, eyes, mouth, and heart. Lupus is an autoimmune disease. This means that immune cells in the body begin attacking normal body cells. The cause of this is not known.  Common symptoms include:  · A butterfly-shaped rash across the bridge of the nose and cheeks or a disk-shaped rash on the face, neck, or chest  · Sun sensitivity (a short time in the sun may lead to severe sunburn or rash)  · Stiff, painful, or swollen joints (arthritis)  · Fatigue or depression  · Fever  Your healthcare provider may prescribe medicines such as oral steroids or medicines to suppress the immune system. Some people benefit from anti-malarial medicines as well. People with lupus are more likely to have heart disease. So, it is vital to manage other risk factors for heart disease. These include high blood pressure, smoking, and unhealthy cholesterol.   There is no cure for lupus. With good care, though, most people with the condition lead normal, active lives.   Home care  · If you were prescribed a medicine, take it as directed.  · Unless another pain medicine was prescribed, take an over-the-counter pain medicine such as acetaminophen or ibuprofen for pain. Do not take ibuprofen or other NSAID (non-steroidal anti-inflammatory) medicine if you were prescribed prednisone.  · Avoid sun exposure. Cover up with clothing. Wear sunglasses. Use sun screen (at least SPF 15).  · Get enough rest and reduce stress to help your immune system.  · Get some physical activity every day. This will help you feel your best.  · If you have high blood pressure, consider buying an automatic blood pressure machine (available at most pharmacies). Use this to monitor your blood pressure and report to your doctor.  · Limit alcohol intake. Eat a healthy, balanced diet low in fat and cholesterol.  · If you smoke, quit.  Smoking increases the risk of lupus-related complications.  Follow-up care  Follow up with your healthcare provider or as advised by our staff.  For more information contact the Lupus Foundation at [phone]  www.lupus.org  When to seek medical advice  Call your healthcare provider for any of the following:  · Increasing weakness, fainting  · Chest pain or shortness of breath or pain with breathing  · Severe headache with fever  · Seizures  · Leg swelling, redness or tenderness (sign of blood clot)  · Unusual bruising or bleeding anywhere on your body  · Blood in your stool (black or red color)  · Abdominal pain, repeated vomiting  · Blood or development of significant bubbles in the urine  · Swelling in the legs and arms  · Development of ulcers in the mouth  · New rash  · Rashes, discoloration or ulcerations on the finger tips or toes  · You become pregnant or are planning to become pregnant  Date Last Reviewed: 3/1/2017  © 4656-1206 [employer]. [address] [place] [address]. All rights reserved. This information is not intended as a substitute for professional medical care. Always follow your healthcare professional's instructions.        Understanding Systemic Lupus Erythematosus (Lupus)  Systemic lupus erythematosus is a disease that causes your bodys immune system to attack its own cells and tissues. It can affect your joints and nervous system. It can affect blood vessels. And it can affect organs such as the skin, kidneys, lungs, and brain. It can cause rashes, fatigue, pain, and fever. Severe lupus can cause harm to organs and other serious problems, including death.  Lupus is an ongoing (chronic) disease. It can be mild to severe. It is most common in young adult women. Lupus has no cure, but medicines can help symptoms. And you can help manage lupus by living a healthy lifestyle and working with your healthcare provider.  What causes lupus?  Your body protects itself with  the immune system. The immune system makes proteins called antibodies. These protect against bacteria, viruses, foreign bodies, and cancer cells. In some people, the immune system makes antibodies that attack the bodys own cells. This leads to inflammation and tissue damage in the body. This is what happens in lupus as well as some other diseases.   Healthcare providers dont know why this happens. Experts think it may be caused by a mix of genes and other factors. The other factors may include certain viruses and allergies. Genetics do play a role in lupus.   Symptoms  Lupus can appear in many parts of the body. Because of this, it can affect people in different ways. You may have only some of these symptoms. You are not likely to have all of these symptoms. Some of the common symptoms of lupus are:  · Fatigue  · Fever  · Sores in the mouth or nose  · Patchy hair loss  · Butterfly-shaped rash on the cheeks of the face (malar rash)  · Rashes caused by sunlight  · Swollen or painful joints (arthritis)  · Muscle pain  · Weight loss  · Heartburn (acid reflux)  · Pain in the stomach  · Less blood flow in the fingers and toes  · Headaches  · Feeling dizzy  · Abnormal amounts of blood cells  · Bruising or bleeding  · Depression  · Confusion  Severe symptoms can include:  · Swelling in legs and ankles (edema)  · Inflammation of tissue around the lungs that causes chest pain when breathing (pleurisy)  · Inflammation of the lining of the heart (pericarditis)  · Seizures  · Kidney problems  · Miscarriage  · Abnormal amounts of blood cells      What is remission?  Remission is when symptoms go away for a period of time. Lupus can go into remission. If lupus flares up again, your symptoms may return the same as before.   Possible complications of lupus  Lupus can cause harm to the body over time. This can lead to serious problems, such as:  · Lupus nephritis (kidney disease). Over time, this can lead to kidney failure. Kidney  failure is treated with dialysis or kidney transplant. Kidney disease can cause certain symptoms. Call your healthcare provider if you have swelling in your hands, feet, or around your eyes. Also tell your healthcare provider if you have changes in urination. These include needing to go more often, pain when you urinate, or dark urine.  · Clogged arteries (atherosclerosis). This raises a persons risk for heart attack, heart failure, and stroke. To help lower your risk for these problems, you can make certain lifestyle changes. Stop smoking, and get your blood pressure, diabetes, and cholesterol under control. Stay active and healthy.  Diagnosing lupus  Lupus is hard to diagnose. This is because it has many possible symptoms that could have other causes. And, the symptoms can happen slowly over time.  To diagnose lupus, your healthcare provider will ask about your medical history. He or she will ask about your symptoms. Your healthcare provider may suspect you have lupus if you have four or more symptoms and he or she can find no specific cause. You may have tests to help confirm the diagnosis. The tests may include:  · Antibody blood tests. These tests are done to look for certain kinds of antibodies in your blood. The main test for lupus is the antinuclear antibodies (CARYN) test. Most people (97%) with lupus will have a positive CARYN test result. Other tests check for other kinds of antibodies.  · CBC blood test. This test checks for low counts of red blood cells, white blood cells, and platelets.  · Other blood tests. More blood tests may be done to look for other problems. Some look for signs of inflammation in the body. Some tests look for certain kinds of proteins. Other tests check how fast your blood clots.  · Urine tests. These are to look for blood or protein in the urine. This can mean your kidneys are not working normally.  · Biopsies. A biopsy is when tiny pieces of tissue are taken from the body to be  checked with a microscope. To look for signs of lupus, biopsies may be done of the skin and kidneys. The test looks for damage to these organs.  Treating lupus  Lupus is treated in many ways. You may work with a rheumatologist. This is a healthcare provider who specializes in lupus, arthritis, and other related diseases. You may also work with other kinds of healthcare providers. These include primary healthcare providers and specialists in kidney disease, blood disorders, immune disorders, and heart problems. You may also meet with a  to help you manage your treatment plan. The goals of treatment include treating symptoms, preventing flare-ups of lupus, and helping reduce damage to the body.  Your healthcare provider may give you medicine to help treat symptoms. Medicines cant cure lupus, but they can help prevent organ damage or suppress the disease. Your healthcare provider will prescribe one or more medicines to help you feel better. Be sure to take them as directed. You may be given medicines such as:  · Nonsteroidal anti-inflammatory drugs (NSAIDS). These can be used to help relieve swelling, pain, and fever.  · Antimalarial medicine. A medicine used to prevent and treat malaria can help ease some lupus symptoms. It can treat fatigue, rashes, joint pain, and mouth sores. The medicine may also help prevent blood clots.  · Corticosteroid (steroids) medicines. These can help people when lupus affects the kidneys, lungs, or heart, or nervous system.  · Medicines that suppress the immune system. These can help treat severe symptoms of lupus that has attacked organs.                  · Other medicines. A medicine called a biologic may be a choice. Clinical trials are also being done to test other medicines that may help people with lupus.     Talk with your healthcare providers about the risks, benefits, and possible side effects of all medicines.  Managing your health  Lupus can also be managed by  keeping a healthy lifestyle. Here are ways to take care of yourself:  · Find the right balance of rest and activity.  · Eat plenty of vegetables, fruits, and grains.  · Maintain a healthy weight.  · Exercise a few times a week, at least. Try walking, swimming, or biking.  · Learn ways to reduce or manage stress.  · Stay out of the sun as much as you can. Use sunscreen with SPF 15 or higher.  · Quit smoking if you currently smoke.  · Stay educated and current on lupus information.   Work with your healthcare provider to manage your lupus. Be sure to see your healthcare provider for regular checkups and tests.  Pregnancy and lupus  If you are a woman of child-bearing age, talk with your healthcare provider about the risks of pregnancy and lupus. Lupus symptoms can flare during pregnancy. Pregnancy with lupus is high-risk, so you will need extra care from your healthcare team. You may need to see your healthcare provider more often.  Getting support  Lupus is a chronic disease. It can put special demands on your life. Family and friends can be a good source of help and support. You may also want to join a support group for lupus patients. By talking with other people who have lupus, you may learn new ways to cope. You may also feel less alone. For more information, contact the following:  · The Arthritis Foundation  [phone]  www.arthritis.org  · [facility] [place]  [phone]  www.lupus.org  · The Lupus Initiative, [employer] [phone] www.thelupusinitiative.org  Date Last Reviewed: 2/1/2016 © 2000-2017 [employer]. [address] [place] [address], [place] [address]. All rights reserved. This information is not intended as a substitute for professional medical care. Always follow your healthcare professional's instructions.

## 2018-09-21 NOTE — PROCEDURES
DATE OF SERVICE:  09/20/2018.    EEG NUMBER:  OC .    REFERRING PHYSICIAN:  Dr. James.    This EEG was performed to assess for evidence of underlying epilepsy.    ELECTROENCEPHALOGRAM REPORT    METHODOLOGY:  Electroencephalographic (EEG) recording is recorded with   electrodes placed according to the International 10-20 placement system.  Thirty   two (32) channels of digital signal (sampling rate of 512/sec), including T1   and T2, were simultaneously recorded from the scalp and may include EKG, EMG,   and/or eye monitors.  Recording band pass was 0.1 to 512 Hz.  Digital video   recording of the patient is simultaneously recorded with the EEG.  The patient   is instructed to report clinical symptoms which may occur during the recording   session.  EEG and video recording are stored and archived in digital format.    Activation procedures, which include photic stimulation, hyperventilation and   instructing patients to perform simple tasks, are done in selected patients.    The EEG is displayed on a monitor screen and can be reviewed using different   montages.  Computer assisted-analysis is employed to detect spike and   electrographic seizure activity.   The entire record is submitted for computer   analysis.  The entire recording is visually reviewed, and the times identified   by computer analysis as being spikes or seizures are reviewed again.    Compressed spectral analysis (CSA) is also performed on the activity recorded   from each individual channel.  This is displayed as a power display of   frequencies from 0 to 30 Hz over time.   The CSA is reviewed looking for   asymmetries in power between homologous areas of the scalp, then compared with   the original EEG recording.    [employer] software was also utilized in the review of this study.  This software   suite analyzes the EEG recording in multiple domains.  Coherence and rhythmicity   are computed to identify EEG sections which may contain  organized seizures.    Each channel undergoes analysis to detect the presence of spike and sharp waves   which have special and morphological characteristics of epileptic activity.  The   routine EEG recording is converted from special into frequency domain.  This is   then displayed comparing homologous areas to identify areas of significant   asymmetry.  Algorithm to identify non-cortically generated artifact is used to   separate artifact from the EEG.    EEG FINDINGS:  The recording was obtained with a number of standard bipolar and   referential montages during wakefulness, drowsiness and sleep.  In the alert   state, the posterior background rhythm was a symmetric, well-modulated 8 Hz   activity, which reacted symmetrically to eye opening.  Intermittent photic   stimulation evoked symmetric posterior driving responses.  No abnormalities were   activated by photic stimulation.  Hyperventilation was not performed.  During   drowsiness, the background rhythm waxed and waned and there were periods of   slowing.  During stage II sleep, symmetric V waves and sleep spindles were   noted.  There were no interictal epileptiform abnormalities and no clinical or   electrographic seizures were recorded.    The EKG channel revealed sinus rhythm.    IMPRESSION:  This is a mildly abnormal EEG during wakefulness, drowsiness and   sleep.  Mild slowing of the background was noted.    CLINICAL CORRELATION:  The patient is a 72-year-old female with a history of   multiple medical problems who is being evaluated for visual disturbances and is   currently not maintained on any anti-seizure medications.  This is a mildly   abnormal EEG during wakefulness, drowsiness and sleep.  There were degree of   slowing for given age is suggestive of a mild encephalopathy, nonspecific to the   cause.  There is no evidence of an epileptic process on this recording.  No   seizures were recorded during this study.      FAK/HN  dd: 09/20/2018  11:39:28 (REBELT)  td: 09/21/2018 02:50:42 (SELENA)  Doc ID   #[id number]  Job ID #[id number]    CC:

## 2018-09-24 ENCOUNTER — TELEPHONE (OUTPATIENT)
Dept: PRIMARY CARE CLINIC | Facility: CLINIC | Age: 73
End: 2018-09-24

## 2018-09-24 ENCOUNTER — PATIENT MESSAGE (OUTPATIENT)
Dept: PRIMARY CARE CLINIC | Facility: CLINIC | Age: 73
End: 2018-09-24

## 2018-09-24 ENCOUNTER — OUTPATIENT CASE MANAGEMENT (OUTPATIENT)
Dept: ADMINISTRATIVE | Facility: OTHER | Age: 73
End: 2018-09-24

## 2018-09-24 DIAGNOSIS — I25.84 CORONARY ARTERY DISEASE DUE TO CALCIFIED CORONARY LESION: ICD-10-CM

## 2018-09-24 DIAGNOSIS — M51.37 DEGENERATION OF LUMBAR OR LUMBOSACRAL INTERVERTEBRAL DISC: Primary | ICD-10-CM

## 2018-09-24 DIAGNOSIS — I51.89 LEFT VENTRICULAR DIASTOLIC DYSFUNCTION WITH PRESERVED SYSTOLIC FUNCTION: ICD-10-CM

## 2018-09-24 DIAGNOSIS — I25.10 CORONARY ARTERY DISEASE DUE TO CALCIFIED CORONARY LESION: ICD-10-CM

## 2018-09-24 NOTE — TELEPHONE ENCOUNTER
Referral to [facility] sent today. The previous referral on 9/11 did not go through.    Thanks,  KJ

## 2018-09-24 NOTE — PROGRESS NOTES
Summary:  Chart reviewed in epic.  Call to pt and she states she is well but she has not heard from palliative team.  She states she called dr. Templeton office but has not heard back form them.      Interventions:  Hypertension  Reviewed blood pressure and wt parameters with pt  Dietary recall performed b eggs and raisin bread, cabbage with pickled pork.  Reviewed sodium content in pickled products (almost 1200 mg is 3 oz) and advised on low sodium alternatives such as uncured chiu and low sodium beef broth to add for flavor.  Pt verbalized understanding.  Reviewed purpose of palliative care team.  Call to palliative department and spoke with Yumi Murrell.  She indicates that she just spoke with dr. Templeton and did not receive the initial order.  Dr. Templeton is resubmitting the orders and she also has her on her list to contact.  Pt states she did not received any mail from me yet.  Will check at next encounter and if not received will remail  Plan:  Follow up on contact from palliative care team.  Follow up on receipt of education material  Resend if not received next encounter  Review low sodium diet  Follow up on receipt of [employer] catalog      Todays OPCM Self-Management Care Plan was developed with the patients/caregivers input and was based on identified barriers from todays assessment.  Goals were written today with the patient/caregiver and the patient has agreed to work towards these goals to improve his/her overall well-being. Patient verbalized understanding of the care plan, goals, and all of today's instructions. Encouraged patient/caregiver to communicate with his/her physician and health care team about health conditions and the treatment plan.  Provided my contact information today and encouraged patient/caregiver to call me with any questions as needed.

## 2018-09-25 ENCOUNTER — HOSPITAL ENCOUNTER (OUTPATIENT)
Dept: RADIOLOGY | Facility: HOSPITAL | Age: 73
Discharge: HOME | DRG: 291 | End: 2018-09-25
Attending: INTERNAL MEDICINE
Payer: MEDICARE

## 2018-09-25 ENCOUNTER — TELEPHONE (OUTPATIENT)
Dept: PRIMARY CARE CLINIC | Facility: CLINIC | Age: 73
End: 2018-09-25

## 2018-09-25 ENCOUNTER — OFFICE VISIT (OUTPATIENT)
Dept: PRIMARY CARE CLINIC | Facility: CLINIC | Age: 73
DRG: 291 | End: 2018-09-25
Payer: MEDICARE

## 2018-09-25 VITALS
DIASTOLIC BLOOD PRESSURE: 48 MMHG | OXYGEN SATURATION: 97 % | HEIGHT: 64 IN | SYSTOLIC BLOOD PRESSURE: 100 MMHG | WEIGHT: 119.69 LBS | BODY MASS INDEX: 20.43 KG/M2 | HEART RATE: 54 BPM

## 2018-09-25 DIAGNOSIS — R79.9 ABNORMAL FINDING OF BLOOD CHEMISTRY: ICD-10-CM

## 2018-09-25 DIAGNOSIS — N18.4 ANEMIA OF CHRONIC RENAL FAILURE, STAGE 4 (SEVERE): ICD-10-CM

## 2018-09-25 DIAGNOSIS — D63.1 ANEMIA OF CHRONIC RENAL FAILURE, STAGE 4 (SEVERE): ICD-10-CM

## 2018-09-25 DIAGNOSIS — I51.89 LEFT VENTRICULAR DIASTOLIC DYSFUNCTION WITH PRESERVED SYSTOLIC FUNCTION: ICD-10-CM

## 2018-09-25 DIAGNOSIS — I50.32 CHRONIC DIASTOLIC CONGESTIVE HEART FAILURE: Primary | ICD-10-CM

## 2018-09-25 DIAGNOSIS — N18.4 CKD (CHRONIC KIDNEY DISEASE), STAGE IV: ICD-10-CM

## 2018-09-25 PROCEDURE — 1101F PT FALLS ASSESS-DOCD LE1/YR: CPT | Mod: CPTII,S$GLB,, | Performed by: INTERNAL MEDICINE

## 2018-09-25 PROCEDURE — 3074F SYST BP LT 130 MM HG: CPT | Mod: CPTII,S$GLB,, | Performed by: INTERNAL MEDICINE

## 2018-09-25 PROCEDURE — 71046 X-RAY EXAM CHEST 2 VIEWS: CPT | Mod: TC

## 2018-09-25 PROCEDURE — 71046 X-RAY EXAM CHEST 2 VIEWS: CPT | Mod: 26,,, | Performed by: RADIOLOGY

## 2018-09-25 PROCEDURE — 3078F DIAST BP <80 MM HG: CPT | Mod: CPTII,S$GLB,, | Performed by: INTERNAL MEDICINE

## 2018-09-25 PROCEDURE — 99212 OFFICE O/P EST SF 10 MIN: CPT | Mod: S$GLB,,, | Performed by: INTERNAL MEDICINE

## 2018-09-25 RX ORDER — METOLAZONE 2.5 MG/1
2.5 TABLET ORAL DAILY
Qty: 90 TABLET | Refills: 3 | Status: ON HOLD | OUTPATIENT
Start: 2018-09-25 | End: 2018-10-04 | Stop reason: HOSPADM

## 2018-09-25 NOTE — ASSESSMENT & PLAN NOTE
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

## 2018-09-25 NOTE — PROGRESS NOTES
"Primary Care Provider Appointment    Subjective:      Patient ID: Ewelina Arnold is a 72 y.o. female with CHF, fatigue, aortic stenosis, HTN    Chief Complaint: Cold Exposure; Leg Pain (both); and Shortness of Breath    Patient seen urgently today. She can't figure out if she's in volume overload or dehydrated. She complains of abdominal pain and fatigue since Sunday. She hasn't been taking her lasix for the past 3 days, starting with lasix 40mg last night. Her BP is low, weight is stable.    Her Hg was dropping in 9/11/18. Has CKD with ACI, followed by [facility]. SHe does not have blood in stool, urine, cough. No recent trauma or other source of blood loss.    She has recent history of vision changes. Eval by optho did not find eye etiology. Referred to Neuro. She had an EEG on 9/20 during hospital admit, which was "mildly abnormal". No seizures recorded, no clear etiology of her vision changes. Her MRI showed chronic microvascular change and remote infarct.    Past Surgical History:   Procedure Laterality Date    BIOPSY-BONE MARROW Left 10/16/2017    [facility] at [facility] OR 2ND FLR    CARDIAC CATHETERIZATION  07/27/2011    x1    CHOLECYSTECTOMY  1999    COLON SURGERY  2000 & 2003    partial removal    COLONOSCOPY N/A 4/14/2016    Procedure: COLONOSCOPY;  Surgeon: Jose Steen MD;  Location: [address]);  Service: Endoscopy;  Laterality: N/A;  prep 2 days prior light meals only/clear liquid day before  and 4 dulcolax tabs (5 mgs) at noon    COLONOSCOPY N/A 9/14/2017    Procedure: COLONOSCOPY;  Surgeon: Jose Steen MD;  Location: [address]);  Service: Endoscopy;  Laterality: N/A;    COLONOSCOPY N/A 9/14/2017    Performed by [facility] at [facility] ([facility])    COLONOSCOPY N/A 4/14/2016    [facility]    COLONOSCOPY N/A 4/23/2013    Performed by Jose Steen MD at [facility] ([address] [place] [address])    TALIA-TRANSFORAMINAL Left " 11/3/2016    [facility]    ESOPHAGOGASTRODUODENOSCOPY (EGD) N/A 3/16/2017    [facility]O    EYE SURGERY      HAND SURGERY Bilateral 1996 & 1997    due to carpal tunnel     HERNIA REPAIR      HYSTERECTOMY  1983    INJECTION-STEROID-EPIDURAL-TRANSFORAMINAL Left 1/7/2016    Performed by Brett Johnson MD at [facility]    Left heart cath Left 12/20/2017    Performed by Chandan Ly MD at [facility] CATH LAB    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    OOPHORECTOMY      removed one    Peripheral Iridotomy both eyes  1994    laser angle correction    THYROIDECTOMY, PARTIAL  2006    to remove two nodules and one-half thyroid       Past Medical History:   Diagnosis Date    Acute hypoxemic respiratory failure 4/28/2018    Acute on chronic diastolic congestive heart failure 11/17/2017    Allergy     Anatomical narrow angle glaucoma     Anemia     States diagnosed about a month ago    Aortic atherosclerosis     Arthritis     Cataract     CHF (congestive heart failure)     Chronic kidney disease     Chronic sciatica of left side     Right lower back pain due to sciatica    Coronary artery disease     Depression     Dry eyes     GERD (gastroesophageal reflux disease)     History of colon cancer     Hyperaldosteronism     Hyperlipidemia     Hypertension     Hypothyroidism     Keloid cicatrix     Left ventricular diastolic dysfunction with preserved systolic function     Lupus (systemic lupus erythematosus) 8/17/2012    Malnutrition 5/16/2017    Osteoarthritis of cervical spine 8/17/2012    Osteopenia     PAD (peripheral artery disease)     FRAN (renal artery stenosis)        Review of Systems   Constitutional: Positive for activity change. Negative for appetite change.   Respiratory: Negative for cough and shortness of breath.    Cardiovascular: Negative for leg swelling.   Genitourinary: Negative  "for difficulty urinating and dysuria.   Skin: Positive for wound.   Neurological: Positive for dizziness. Negative for light-headedness.   Hematological: Bruises/bleeds easily.   Psychiatric/Behavioral: Negative for agitation, behavioral problems, confusion, decreased concentration and dysphoric mood.       Objective:   BP (!) 100/48 (BP Location: Left arm, Patient Position: Sitting, BP Method: Medium (Manual))   Pulse (!) 54   Ht 5' 4" (1.626 m)   Wt 54.3 kg (119 lb 11.4 oz)   LMP  (LMP Unknown) Comment: pt had taken bp meds less than an hour ago  SpO2 97%   BMI 20.55 kg/m²     Physical Exam   Constitutional: She is oriented to person, place, and time. She appears well-developed and well-nourished.   HENT:   Head: Normocephalic and atraumatic.   Neck: Normal range of motion.   Musculoskeletal: She exhibits deformity. She exhibits no edema.   Resolution of LLE nodule   Neurological: She is alert and oriented to person, place, and time.   Psychiatric: She has a normal mood and affect. Her behavior is normal. Thought content normal.   Vitals reviewed.      Lab Results   Component Value Date    WBC 3.13 (L) 09/11/2018    HGB 8.8 (L) 09/11/2018    HCT 27.5 (L) 09/11/2018     (L) 09/11/2018    CHOL 181 08/28/2018    TRIG 54 08/28/2018    HDL 66 08/28/2018    ALT 15 09/05/2018    AST 42 (H) 09/05/2018     (L) 09/11/2018    K 4.1 09/11/2018    CL 99 09/11/2018    CREATININE 3.8 (H) 09/11/2018    BUN 65 (H) 09/11/2018    CO2 26 09/11/2018    TSH 0.954 09/05/2018    INR 1.0 09/05/2018    GLUF 79 12/02/2011    HGBA1C 5.7 01/24/2017       Weight log      BP log       Assessment:   72 y.o. 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  · F/U Dr Epperson  · PCP will message him once the labs result  · May need blood transfusion or IV iron  · Monitor  · Avoid nephrotoxic meds            Oncology    Anemia of chronic renal failure, stage 4 (severe)     Followed by Nephrology, PRN iron infusions  · F/U Nephrology   PCP will message Dr Epperson for appt   · May need iron infusion  · Iron infusion PRN  · May need erythropoetin               Health Maintenance       Date Due Completion Date    DEXA SCAN 01/05/2019 1/5/2016    High Dose Statin 09/14/2019 9/14/2018    Mammogram 09/11/2020 9/11/2018    Colonoscopy 09/14/2022 9/14/2017    Lipid Panel 08/28/2023 8/28/2018    TETANUS VACCINE 06/29/2026 6/29/2016          Follow-up in about 2 weeks (around 10/9/2018). . One hour spent with this patient today, half of that in counseling.    Amarilys Johns MD/MPH  Internal Medicine  [facility]  [phone]

## 2018-09-25 NOTE — PATIENT INSTRUCTIONS
TODAY:  - labs today  - CXR today  - don't take lasix until labs and CXR result  - Dr NGUYEN will call you tonight about next steps

## 2018-09-25 NOTE — ASSESSMENT & PLAN NOTE
Followed by Nephrology, PRN iron infusions  · F/U Nephrology   PCP will message Dr Epperson for appt   · May need iron infusion  · Iron infusion PRN  · May need erythropoetin

## 2018-09-25 NOTE — ASSESSMENT & PLAN NOTE
Low EF, aortic stenosis on recent echo  · F/U heart failure clinic- Dr Burdick  · PCP to manage digital HTN program  · Labs today  · CXR today to assess for heart failure exacerbation  · Restart diuretics

## 2018-09-26 ENCOUNTER — TELEPHONE (OUTPATIENT)
Dept: PRIMARY CARE CLINIC | Facility: CLINIC | Age: 73
End: 2018-09-26

## 2018-09-26 DIAGNOSIS — I51.89 LEFT VENTRICULAR DIASTOLIC DYSFUNCTION WITH PRESERVED SYSTOLIC FUNCTION: Primary | ICD-10-CM

## 2018-09-26 NOTE — TELEPHONE ENCOUNTER
PCP called patient about her labs and XR. Her pro-BNP is slightly above baseline, and her CXR showed small pleural effusion. She was advised to increase her lasix to 40mg BID. She will see Dr Isabel (interventional cardiology) and PCP this Fri. [facility] NP Albertina Veliz contacted to make home visit for patient.    Dr Epperson- Ms Arnold anemia persists, and she is complaining of fatigue. Is she due for an iron infusion? Is she eligible for epo injections?     Albertina- could you get her an appointment with us on Fri at 8am? She will need CXR that morning before the visit (order placed). I will see her before the other home visits that day.    Thanks,  WENDY

## 2018-09-28 ENCOUNTER — OFFICE VISIT (OUTPATIENT)
Dept: PRIMARY CARE CLINIC | Facility: CLINIC | Age: 73
DRG: 291 | End: 2018-09-28
Payer: MEDICARE

## 2018-09-28 ENCOUNTER — HOSPITAL ENCOUNTER (OUTPATIENT)
Dept: RADIOLOGY | Facility: HOSPITAL | Age: 73
Discharge: HOME | DRG: 291 | End: 2018-09-28
Attending: INTERNAL MEDICINE
Payer: MEDICARE

## 2018-09-28 ENCOUNTER — HOSPITAL ENCOUNTER (INPATIENT)
Facility: HOSPITAL | Age: 73
LOS: 6 days | Discharge: NURSING, REHAB OR LONG-TERM CARE | DRG: 291 | End: 2018-10-04
Attending: INTERNAL MEDICINE | Admitting: HOSPITALIST
Payer: MEDICARE

## 2018-09-28 ENCOUNTER — TELEPHONE (OUTPATIENT)
Dept: PRIMARY CARE CLINIC | Facility: CLINIC | Age: 73
End: 2018-09-28

## 2018-09-28 ENCOUNTER — INITIAL CONSULT (OUTPATIENT)
Dept: CARDIOLOGY | Facility: CLINIC | Age: 73
DRG: 291 | End: 2018-09-28
Payer: MEDICARE

## 2018-09-28 VITALS
OXYGEN SATURATION: 98 % | WEIGHT: 111.31 LBS | BODY MASS INDEX: 19 KG/M2 | HEART RATE: 61 BPM | DIASTOLIC BLOOD PRESSURE: 63 MMHG | SYSTOLIC BLOOD PRESSURE: 123 MMHG | HEIGHT: 64 IN

## 2018-09-28 VITALS
HEART RATE: 62 BPM | HEIGHT: 64 IN | BODY MASS INDEX: 20.32 KG/M2 | OXYGEN SATURATION: 99 % | DIASTOLIC BLOOD PRESSURE: 48 MMHG | SYSTOLIC BLOOD PRESSURE: 120 MMHG | WEIGHT: 119 LBS

## 2018-09-28 DIAGNOSIS — I50.31 ACUTE HEART FAILURE WITH NORMAL EJECTION FRACTION: ICD-10-CM

## 2018-09-28 DIAGNOSIS — I10 HYPERTENSION, UNSPECIFIED TYPE: ICD-10-CM

## 2018-09-28 DIAGNOSIS — I51.89 LEFT VENTRICULAR DIASTOLIC DYSFUNCTION WITH PRESERVED SYSTOLIC FUNCTION: ICD-10-CM

## 2018-09-28 DIAGNOSIS — E78.5 HYPERLIPIDEMIA, UNSPECIFIED HYPERLIPIDEMIA TYPE: ICD-10-CM

## 2018-09-28 DIAGNOSIS — I35.0 AORTIC VALVE STENOSIS, ETIOLOGY OF CARDIAC VALVE DISEASE UNSPECIFIED: ICD-10-CM

## 2018-09-28 DIAGNOSIS — I50.33 ACUTE ON CHRONIC DIASTOLIC CONGESTIVE HEART FAILURE: ICD-10-CM

## 2018-09-28 DIAGNOSIS — N18.4 ANEMIA IN STAGE 4 CHRONIC KIDNEY DISEASE: ICD-10-CM

## 2018-09-28 DIAGNOSIS — I50.20 SYSTOLIC CONGESTIVE HEART FAILURE, UNSPECIFIED HF CHRONICITY: ICD-10-CM

## 2018-09-28 DIAGNOSIS — D63.1 ANEMIA IN STAGE 4 CHRONIC KIDNEY DISEASE: ICD-10-CM

## 2018-09-28 DIAGNOSIS — E87.20 METABOLIC ACIDOSIS, NORMAL ANION GAP (NAG): ICD-10-CM

## 2018-09-28 DIAGNOSIS — I50.9 CONGESTIVE HEART FAILURE, UNSPECIFIED HF CHRONICITY, UNSPECIFIED HEART FAILURE TYPE: Primary | ICD-10-CM

## 2018-09-28 DIAGNOSIS — N17.9 AKI (ACUTE KIDNEY INJURY): ICD-10-CM

## 2018-09-28 DIAGNOSIS — Z90.5 SOLITARY KIDNEY, ACQUIRED: ICD-10-CM

## 2018-09-28 DIAGNOSIS — E87.1 HYPONATREMIA: ICD-10-CM

## 2018-09-28 DIAGNOSIS — I50.9 CHF (CONGESTIVE HEART FAILURE): ICD-10-CM

## 2018-09-28 DIAGNOSIS — I50.1 PULMONARY EDEMA CARDIAC CAUSE: ICD-10-CM

## 2018-09-28 DIAGNOSIS — N18.4 CKD (CHRONIC KIDNEY DISEASE), STAGE IV: ICD-10-CM

## 2018-09-28 DIAGNOSIS — E55.9 HYPOVITAMINOSIS D: ICD-10-CM

## 2018-09-28 LAB
25(OH)D3+25(OH)D2 SERPL-MCNC: 22 NG/ML
FERRITIN SERPL-MCNC: 1035 NG/ML
IRON SERPL-MCNC: 19 UG/DL
OSMOLALITY SERPL: 316 MOSM/KG
PHOSPHATE SERPL-MCNC: 5.6 MG/DL
PTH-INTACT SERPL-MCNC: 310 PG/ML
SATURATED IRON: 10 %
TOTAL IRON BINDING CAPACITY: 191 UG/DL
TRANSFERRIN SERPL-MCNC: 129 MG/DL

## 2018-09-28 PROCEDURE — 71046 X-RAY EXAM CHEST 2 VIEWS: CPT | Mod: TC

## 2018-09-28 PROCEDURE — 36415 COLL VENOUS BLD VENIPUNCTURE: CPT

## 2018-09-28 PROCEDURE — 99215 OFFICE O/P EST HI 40 MIN: CPT | Mod: S$GLB,,, | Performed by: INTERNAL MEDICINE

## 2018-09-28 PROCEDURE — 1101F PT FALLS ASSESS-DOCD LE1/YR: CPT | Mod: CPTII,S$GLB,, | Performed by: INTERNAL MEDICINE

## 2018-09-28 PROCEDURE — 1101F PT FALLS ASSESS-DOCD LE1/YR: CPT | Mod: CPTII,,, | Performed by: INTERNAL MEDICINE

## 2018-09-28 PROCEDURE — 83540 ASSAY OF IRON: CPT

## 2018-09-28 PROCEDURE — 3074F SYST BP LT 130 MM HG: CPT | Mod: CPTII,,, | Performed by: INTERNAL MEDICINE

## 2018-09-28 PROCEDURE — 82306 VITAMIN D 25 HYDROXY: CPT

## 2018-09-28 PROCEDURE — 99215 OFFICE O/P EST HI 40 MIN: CPT | Mod: PBBFAC,25 | Performed by: INTERNAL MEDICINE

## 2018-09-28 PROCEDURE — 3074F SYST BP LT 130 MM HG: CPT | Mod: CPTII,S$GLB,, | Performed by: INTERNAL MEDICINE

## 2018-09-28 PROCEDURE — 99999 PR PBB SHADOW E&M-EST. PATIENT-LVL V: CPT | Mod: PBBFAC,,, | Performed by: INTERNAL MEDICINE

## 2018-09-28 PROCEDURE — 3078F DIAST BP <80 MM HG: CPT | Mod: CPTII,S$GLB,, | Performed by: INTERNAL MEDICINE

## 2018-09-28 PROCEDURE — 63600175 PHARM REV CODE 636 W HCPCS: Performed by: HOSPITALIST

## 2018-09-28 PROCEDURE — 83930 ASSAY OF BLOOD OSMOLALITY: CPT

## 2018-09-28 PROCEDURE — 99214 OFFICE O/P EST MOD 30 MIN: CPT | Mod: S$PBB,,, | Performed by: INTERNAL MEDICINE

## 2018-09-28 PROCEDURE — 3078F DIAST BP <80 MM HG: CPT | Mod: CPTII,,, | Performed by: INTERNAL MEDICINE

## 2018-09-28 PROCEDURE — 71046 X-RAY EXAM CHEST 2 VIEWS: CPT | Mod: 26,,, | Performed by: RADIOLOGY

## 2018-09-28 PROCEDURE — 82728 ASSAY OF FERRITIN: CPT

## 2018-09-28 PROCEDURE — 93010 ELECTROCARDIOGRAM REPORT: CPT | Mod: ,,, | Performed by: INTERNAL MEDICINE

## 2018-09-28 PROCEDURE — 99223 1ST HOSP IP/OBS HIGH 75: CPT | Mod: ,,, | Performed by: HOSPITALIST

## 2018-09-28 PROCEDURE — 11000001 HC ACUTE MED/SURG PRIVATE ROOM

## 2018-09-28 PROCEDURE — 84100 ASSAY OF PHOSPHORUS: CPT

## 2018-09-28 PROCEDURE — 93005 ELECTROCARDIOGRAM TRACING: CPT

## 2018-09-28 PROCEDURE — 25000003 PHARM REV CODE 250: Performed by: HOSPITALIST

## 2018-09-28 PROCEDURE — 83970 ASSAY OF PARATHORMONE: CPT

## 2018-09-28 PROCEDURE — A4216 STERILE WATER/SALINE, 10 ML: HCPCS | Performed by: HOSPITALIST

## 2018-09-28 RX ORDER — HYDROXYCHLOROQUINE SULFATE 200 MG/1
200 TABLET, FILM COATED ORAL 2 TIMES DAILY
Status: DISCONTINUED | OUTPATIENT
Start: 2018-09-28 | End: 2018-10-04 | Stop reason: HOSPADM

## 2018-09-28 RX ORDER — HEPARIN SODIUM 5000 [USP'U]/ML
5000 INJECTION, SOLUTION INTRAVENOUS; SUBCUTANEOUS EVERY 8 HOURS
Status: DISCONTINUED | OUTPATIENT
Start: 2018-09-28 | End: 2018-10-04 | Stop reason: HOSPADM

## 2018-09-28 RX ORDER — ASPIRIN 81 MG/1
81 TABLET ORAL DAILY
Status: DISCONTINUED | OUTPATIENT
Start: 2018-09-29 | End: 2018-10-03

## 2018-09-28 RX ORDER — ONDANSETRON 4 MG/1
4 TABLET, ORALLY DISINTEGRATING ORAL EVERY 8 HOURS PRN
Status: DISCONTINUED | OUTPATIENT
Start: 2018-09-28 | End: 2018-10-04 | Stop reason: HOSPADM

## 2018-09-28 RX ORDER — FUROSEMIDE 10 MG/ML
100 INJECTION INTRAMUSCULAR; INTRAVENOUS ONCE
Status: COMPLETED | OUTPATIENT
Start: 2018-09-28 | End: 2018-09-28

## 2018-09-28 RX ORDER — NIFEDIPINE 60 MG/1
60 TABLET, EXTENDED RELEASE ORAL 2 TIMES DAILY
Status: DISCONTINUED | OUTPATIENT
Start: 2018-09-28 | End: 2018-10-04 | Stop reason: HOSPADM

## 2018-09-28 RX ORDER — AMOXICILLIN 250 MG
1 CAPSULE ORAL 2 TIMES DAILY PRN
Status: DISCONTINUED | OUTPATIENT
Start: 2018-09-28 | End: 2018-10-04 | Stop reason: HOSPADM

## 2018-09-28 RX ORDER — CARVEDILOL 6.25 MG/1
6.25 TABLET ORAL 2 TIMES DAILY
Status: DISCONTINUED | OUTPATIENT
Start: 2018-09-28 | End: 2018-10-04 | Stop reason: HOSPADM

## 2018-09-28 RX ORDER — RAMELTEON 8 MG/1
8 TABLET ORAL NIGHTLY PRN
Status: DISCONTINUED | OUTPATIENT
Start: 2018-09-28 | End: 2018-10-04 | Stop reason: HOSPADM

## 2018-09-28 RX ORDER — ACETAMINOPHEN 325 MG/1
650 TABLET ORAL EVERY 4 HOURS PRN
Status: DISCONTINUED | OUTPATIENT
Start: 2018-09-28 | End: 2018-10-04 | Stop reason: HOSPADM

## 2018-09-28 RX ORDER — FAMOTIDINE 20 MG/1
20 TABLET, FILM COATED ORAL DAILY
Status: DISCONTINUED | OUTPATIENT
Start: 2018-09-29 | End: 2018-10-03

## 2018-09-28 RX ORDER — ATORVASTATIN CALCIUM 20 MG/1
80 TABLET, FILM COATED ORAL DAILY
Status: DISCONTINUED | OUTPATIENT
Start: 2018-09-29 | End: 2018-10-04 | Stop reason: HOSPADM

## 2018-09-28 RX ORDER — DOXAZOSIN 1 MG/1
1 TABLET ORAL NIGHTLY
Status: DISCONTINUED | OUTPATIENT
Start: 2018-09-28 | End: 2018-10-04 | Stop reason: HOSPADM

## 2018-09-28 RX ORDER — SODIUM CHLORIDE 0.9 % (FLUSH) 0.9 %
3 SYRINGE (ML) INJECTION EVERY 8 HOURS
Status: DISCONTINUED | OUTPATIENT
Start: 2018-09-28 | End: 2018-10-04 | Stop reason: HOSPADM

## 2018-09-28 RX ORDER — LEVOTHYROXINE SODIUM 75 UG/1
75 TABLET ORAL
Status: DISCONTINUED | OUTPATIENT
Start: 2018-09-29 | End: 2018-10-04 | Stop reason: HOSPADM

## 2018-09-28 RX ORDER — ISOSORBIDE MONONITRATE 30 MG/1
60 TABLET, EXTENDED RELEASE ORAL DAILY
Status: DISCONTINUED | OUTPATIENT
Start: 2018-09-29 | End: 2018-10-04 | Stop reason: HOSPADM

## 2018-09-28 RX ORDER — SODIUM BICARBONATE 325 MG/1
650 TABLET ORAL 3 TIMES DAILY
Status: DISCONTINUED | OUTPATIENT
Start: 2018-09-28 | End: 2018-10-04 | Stop reason: HOSPADM

## 2018-09-28 RX ORDER — FUROSEMIDE 10 MG/ML
40 INJECTION INTRAMUSCULAR; INTRAVENOUS DAILY
Qty: 20 ML | Refills: 0 | Status: ON HOLD | OUTPATIENT
Start: 2018-09-28 | End: 2018-10-04 | Stop reason: HOSPADM

## 2018-09-28 RX ADMIN — HYDROXYCHLOROQUINE SULFATE 200 MG: 200 TABLET, FILM COATED ORAL at 09:09

## 2018-09-28 RX ADMIN — SODIUM BICARBONATE 650 MG: 325 TABLET ORAL at 09:09

## 2018-09-28 RX ADMIN — DOXAZOSIN 1 MG: 1 TABLET ORAL at 09:09

## 2018-09-28 RX ADMIN — FUROSEMIDE 10 MG/HR: 10 INJECTION, SOLUTION INTRAMUSCULAR; INTRAVENOUS at 09:09

## 2018-09-28 RX ADMIN — Medication 3 ML: at 09:09

## 2018-09-28 RX ADMIN — FUROSEMIDE 100 MG: 10 INJECTION, SOLUTION INTRAMUSCULAR; INTRAVENOUS at 08:09

## 2018-09-28 RX ADMIN — CARVEDILOL 6.25 MG: 6.25 TABLET, FILM COATED ORAL at 09:09

## 2018-09-28 RX ADMIN — NIFEDIPINE 60 MG: 60 TABLET, FILM COATED, EXTENDED RELEASE ORAL at 09:09

## 2018-09-28 NOTE — PATIENT INSTRUCTIONS
TODAY:  - PCP to message Ray Burdick and Maame about in-home diuresis  - Home health to start with daily IV lasix for 5 days  - [facility] NP Albertina Scott  (for heart failure) will visit you in the home today  - continue all meds, but only take lasix 40mg orally in the evening  - use metolazone before evening lasix  - labs every 2 days  -  [facility]

## 2018-09-28 NOTE — PROGRESS NOTES
Primary Care Provider Appointment    Subjective:      Patient ID: Ewelina Arnold is a 72 y.o. female with diastolic HF, MDD, SLE    Chief Complaint: Follow-up (chest xray ) and Shortness of Breath    Patient with recent hospital admit for CHF. She restarted lasix on Sunday after she started feeling fatigued and SOB. Had small pleural effusion on 9/25, at the time with baseline BNP. Her lasix was increased that day to 40mg BID. Today chest XR has worsened to mild-mod pulm edema vs PNA, but no clinical evidence of infection. Her symptoms have not improved. Weights remain stable at 114#. BP well-controlled.    Her children are checking on her, she continues to be able to perform ADLs, but exercise tolerance is decreasing. She does not want to be admitted to the hospital today. She has an interventional cardiology appointment today to be evaluated for TAVR for aortic stenosis.    Past Surgical History:   Procedure Laterality Date    BIOPSY-BONE MARROW Left 10/16/2017    Performed by [facility] at [facility] OR 2ND FLR    CARDIAC CATHETERIZATION  07/27/2011    x1    CHOLECYSTECTOMY  1999    COLON SURGERY  2000 & 2003    partial removal    COLONOSCOPY N/A 4/14/2016    Procedure: COLONOSCOPY;  Surgeon: Jose Steen MD;  Location: [facility] ([address] [place] [address]);  Service: Endoscopy;  Laterality: N/A;  prep 2 days prior light meals only/clear liquid day before  and 4 dulcolax tabs (5 mgs) at noon    COLONOSCOPY N/A 9/14/2017    Procedure: COLONOSCOPY;  Surgeon: Jose Steen MD;  Location: [address]);  Service: Endoscopy;  Laterality: N/A;    COLONOSCOPY N/A 9/14/2017    Performed by [facility] at [facility] (4TH FLR)    COLONOSCOPY N/A 4/14/2016    Performed by [facility] MD at [facility] (4TH FLR)    COLONOSCOPY N/A 4/23/2013    [facility]    TALIA-TRANSFORAMINAL Left 11/3/2016    [facility]     ESOPHAGOGASTRODUODENOSCOPY (EGD) N/A 3/16/2017    [facility] ENDO    EYE SURGERY      HAND SURGERY Bilateral 1996 & 1997    due to carpal tunnel     HERNIA REPAIR      HYSTERECTOMY  1983    INJECTION-STEROID-EPIDURAL-TRANSFORAMINAL Left 1/7/2016    Per[facility] Brett [facility] [place] [facility]    Left heart cath Left 12/20/2017    [facility] CATH LAB    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    OOPHORECTOMY      removed one    Peripheral Iridotomy both eyes  1994    laser angle correction    THYROIDECTOMY, PARTIAL  2006    to remove two nodules and one-half thyroid       Past Medical History:   Diagnosis Date    Acute hypoxemic respiratory failure 4/28/2018    Acute on chronic diastolic congestive heart failure 11/17/2017    Allergy     Anatomical narrow angle glaucoma     Anemia     States diagnosed about a month ago    Aortic atherosclerosis     Arthritis     Cataract     CHF (congestive heart failure)     Chronic kidney disease     Chronic sciatica of left side     Right lower back pain due to sciatica    Coronary artery disease     Depression     Dry eyes     GERD (gastroesophageal reflux disease)     History of colon cancer     Hyperaldosteronism     Hyperlipidemia     Hypertension     Hypothyroidism     Keloid cicatrix     Left ventricular diastolic dysfunction with preserved systolic function     Lupus (systemic lupus erythematosus) 8/17/2012    Malnutrition 5/16/2017    Osteoarthritis of cervical spine 8/17/2012    Osteopenia     PAD (peripheral artery disease)     FRAN (renal artery stenosis)        Review of Systems   Constitutional: Positive for activity change. Negative for appetite change.   Respiratory: Positive for shortness of breath. Negative for cough.    Cardiovascular: Negative for leg swelling.   Genitourinary: Negative for difficulty urinating and dysuria.   Skin: Positive for  "wound.   Neurological: Positive for dizziness. Negative for light-headedness.   Hematological: Bruises/bleeds easily.   Psychiatric/Behavioral: Negative for agitation, behavioral problems, confusion, decreased concentration and dysphoric mood.       Objective:   BP (!) 120/48 (BP Location: Right arm, Patient Position: Sitting, BP Method: Medium (Manual))   Pulse 62   Ht 5' 4" (1.626 m)   Wt 54 kg (119 lb)   LMP  (LMP Unknown) Comment: pt had taken bp meds less than an hour ago  SpO2 99%   BMI 20.43 kg/m²     Physical Exam   Constitutional: She is oriented to person, place, and time. She appears well-developed and well-nourished.   HENT:   Head: Normocephalic and atraumatic.   Neck: Normal range of motion.   Pulmonary/Chest: Effort normal.   Decreased breath sounds   Musculoskeletal: She exhibits deformity. She exhibits no edema.   Resolution of LLE nodule   Neurological: She is alert and oriented to person, place, and time.   Psychiatric: She has a normal mood and affect. Her behavior is normal. Thought content normal.   Vitals reviewed.      Lab Results   Component Value Date    WBC 5.99 09/25/2018    HGB 9.0 (L) 09/25/2018    HCT 27.5 (L) 09/25/2018     09/25/2018    CHOL 181 08/28/2018    TRIG 54 08/28/2018    HDL 66 08/28/2018    ALT 11 09/25/2018    AST 26 09/25/2018     (L) 09/25/2018    K 4.0 09/25/2018     09/25/2018    CREATININE 4.5 (H) 09/25/2018    BUN 93 (H) 09/25/2018    CO2 19 (L) 09/25/2018    TSH 0.954 09/05/2018    INR 1.0 09/05/2018    GLUF 79 12/02/2011    HGBA1C 5.2 09/25/2018         Assessment:   72 y.o. female with multiple co-morbid illnesses here to continue work-up of chronic issues notably diastolic HF, MDD, SLE       Plan:     Problem List Items Addressed This Visit        Cardiac/Vascular    Left ventricular diastolic dysfunction with preserved systolic function     Normal EF, grade 2 diastolic HF, aortic stenosis on recent echo  · F/U heart failure clinic- " [facility]  · Has interventional cardiology appt today  · PCP to manage digital HTN program  · Labs today  · CXR today to assess for heart failure exacerbation  · Continue diuretics         Relevant Medications    furosemide (LASIX) 10 mg/mL injection    Other Relevant Orders    CBC auto differential    Brain natriuretic peptide    Basic metabolic panel    Ambulatory referral to Home Health    Pulmonary edema cardiac cause     Mild-mod pleural effusion on XR today  · Home health with diuresis   · Heart failure NP to see patietn in home  · Interventional Cardiology today         Relevant Medications    furosemide (LASIX) 10 mg/mL injection    Other Relevant Orders    CBC auto differential    Procalcitonin    Ambulatory referral to Home Health          Health Maintenance       Date Due Completion Date    DEXA SCAN 01/05/2019 1/5/2016    High Dose Statin 09/25/2019 9/25/2018    Mammogram 09/11/2020 9/11/2018    Colonoscopy 09/14/2022 9/14/2017    Lipid Panel 08/28/2023 8/28/2018    TETANUS VACCINE 06/29/2026 6/29/2016          Follow-up in about 3 days (around 10/1/2018). . One hour spent with this patient today, half of that in counseling.    Amarilys Johns MD/MPH  Internal Medicine  [facility]  [phone]

## 2018-09-28 NOTE — PLAN OF CARE
Please call extension [phone] upon patient arrival to floor for Hospital Medicine admit team assignment and for additional admit orders. If patient is coming from another [facility] facility please also call [phone] to inform the admit team/office that patient has arrived from the [facility] facility to the floor so patient can be evaluated.    Clinic direct admti Acceptance Note    Transferring Physician or Mid Level Provider/Speciality: Dr. Ric Du, Cardiology     Accepting Physician: Gayle Dobbs     Date of Acceptance: 09/28/2018     Code Status: Full    [place] [facility]     Reason for Direct admit: ADHF     Report from Transferring Physician or Mid-Level provider/Hospital course:     See cardiology clinic note from today.    To do list upon patient arrival:   - lasix 80 mg IVP X 1, then   - lasix infusion 10 mg/hr   - monitor renal function closely given patient with h/o solitary kidney and   CKD IV-V  - consult nephrology and cardiology     Gayle Dobbs,  Attending Staff Physician   [facility] Medicine

## 2018-09-28 NOTE — ASSESSMENT & PLAN NOTE
Normal EF, grade 2 diastolic HF, aortic stenosis on recent echo  · F/U heart failure clinic- Dr Burdick  · Has interventional cardiology appt today  · PCP to manage digital HTN program  · Labs today  · CXR today to assess for heart failure exacerbation  · Continue diuretics

## 2018-09-28 NOTE — Clinical Note
Dear Ray Burdick and Maame,Ms Arnold continues to have worsening PEDRO and decreased exercise tolerance. Her CXR today shows worsened BL pulm effusions since Monday. She does not want to be admitted. I am ordering in-home diuresis with home health starting today, and the palliative care NP (Albertina Veliz) will see her in the home today. I will see her again on Monday.She sees Dr Isabel today for TAVR eval. I'm getting basic labs before sending her over (pro-BNP, procalcitonin, CBC, BMP). Please provide any additional direction on her management at this time.Thanks,WENDY (PCP)

## 2018-09-28 NOTE — TELEPHONE ENCOUNTER
Dr Epperson-  Ms Clayton' kidney function is acutely worsening, and she is not able to be adequately diuresed. She needs immediate nephrology evaluation and medical advice on managing her complex conditions.    Thanks,  Amarilys Johns MD/MPH  Internal Medicine  [facility]  [phone]

## 2018-09-28 NOTE — ASSESSMENT & PLAN NOTE
Mild-mod pleural effusion on XR today  · Home health with diuresis   · Heart failure NP to see patietn in home  · Interventional Cardiology today

## 2018-09-28 NOTE — PROGRESS NOTES
Cardiology Clinic Note  Reason for Visit: Follow up on HFpEF    HPI:   It was my pleasure today to meet Mrs Arnold, she is a pleasant 72 y.o female here today for worsening SOB.    Patient mentioned she has worsening SOB over the past week and especially yesterday and today. She said she stopped her lasix as she used to take 40 mg PO BID as her kidney function was worsening (she has right kidney only) she is SOB even at rest, she denied any chest pain. She said she is complaint with diet and she have control on her salt intake.     She has PMH of HFpEF, EF 55-60%, CKD stage IV, HLD, HTN, Hypothyroidism, PAD, Living donor, kidney in 1985, 45 pack year h/o smoking, quit 1984, moderate aortic stenosis, PABLITO = 1.12 cm2, peak velocity = 4.08 m/s, mean gradient = 33 mmHg.     Today patient is here with her son, she feels SOB and she looks fluid overloaded with inspiratory rales at lung bases on inspiration and +JVD to her Jaw.   ROS:    Constitution: Negative for fever or chills. Negative for weight loss or gain.   HENT: Negative for sore throat or headaches. Negative for rhinorrhea.  Eyes: Negative for blurred or double vision.   Cardiovascular: See above  Pulmonary: Negative for SOB. Negative for cough.   Gastrointestinal: Negative for abdominal pain. Negative for nausea/ vomiting. Negative for diarrhea.   : Negative for dysuria.   Skin: Negative for rashes.  Neurological: Negative for focal weakness or sensory changes.  Psychological: Negative for depression or anxiety.  PMH:     Past Medical History:   Diagnosis Date    Acute hypoxemic respiratory failure 4/28/2018    Acute on chronic diastolic congestive heart failure 11/17/2017    Allergy     Anatomical narrow angle glaucoma     Anemia     States diagnosed about a month ago    Aortic atherosclerosis     Arthritis     Cataract     CHF (congestive heart failure)     Chronic kidney disease     Chronic sciatica of left side     Right lower back pain due  to sciatica    Coronary artery disease     Depression     Dry eyes     GERD (gastroesophageal reflux disease)     History of colon cancer     Hyperaldosteronism     Hyperlipidemia     Hypertension     Hypothyroidism     Keloid cicatrix     Left ventricular diastolic dysfunction with preserved systolic function     Lupus (systemic lupus erythematosus) 8/17/2012    Malnutrition 5/16/2017    Osteoarthritis of cervical spine 8/17/2012    Osteopenia     PAD (peripheral artery disease)     FRAN (renal artery stenosis)      Past Surgical History:   Procedure Laterality Date    BIOPSY-BONE MARROW Left 10/16/2017    Performed by Grant Santillan [facility] at [facility] OR 2ND FLR    CARDIAC CATHETERIZATION  07/27/2011    x1    CHOLECYSTECTOMY  1999    COLON SURGERY  2000 & 2003    partial removal    COLONOSCOPY N/A 4/14/2016    Procedure: COLONOSCOPY;  Surgeon: Jose Steen MD;  Location: [facility] (4TH FLR);  Service: Endoscopy;  Laterality: N/A;  prep 2 days prior light meals only/clear liquid day before  and 4 dulcolax tabs (5 mgs) at noon    COLONOSCOPY N/A 9/14/2017    Procedure: COLONOSCOPY;  Surgeon: Jose Stene MD;  Location: [facility] (4TH FLR);  Service: Endoscopy;  Laterality: N/A;    COLONOSCOPY N/A 9/14/2017    Performed by Jose Steen MD at [facility] ENDO (4TH FLR)    COLONOSCOPY N/A 4/14/2016    Performed by Jose Steen MD at [facility] ENDO (4TH FLR)    COLONOSCOPY N/A 4/23/2013    Performed by Jose Steen MD at [facility] (4TH FLR)    TALIA-TRANSFORAMINAL Left 11/3/2016    Performed by [facility] at [facility]    ESOPHAGOGASTRODUODENOSCOPY (EGD) N/A 3/16/2017    Performed by Yogesh Fernandes MD at [facility] ENDO    EYE SURGERY      HAND SURGERY Bilateral 1996 & 1997    due to carpal tunnel     HERNIA REPAIR      HYSTERECTOMY  1983    INJECTION-STEROID-EPIDURAL-TRANSFORAMINAL Left 1/7/2016    Performed by Brett [facility] at [facility]    Left heart cath Left  12/20/2017    Performed by Chandan Ly MD at [facility] CATH LAB    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    OOPHORECTOMY      removed one    Peripheral Iridotomy both eyes  1994    laser angle correction    THYROIDECTOMY, PARTIAL  2006    to remove two nodules and one-half thyroid     Allergies:     Review of patient's allergies indicates:   Allergen Reactions    Ace inhibitors Swelling     Lips Swelling    Hydralazine analogues Other (See Comments)     Lupus exacerbation    Vioxx [rofecoxib] Swelling      lips swelling    Bactrim [sulfamethoxazole-trimethoprim]      Pt would like this medication to be added due to elevation of creatinine with single kidney.    Clonidine Hallucinations     Hallucinations    Lactose     Arthrotec 50 [diclofenac-misoprostol]      Unknown    Bentyl [dicyclomine]      Not effective    Doxycycline Nausea Only    Lomotil [diphenoxylate-atropine] Nausea And Vomiting     Stomach cramps with vomitting    Lozol [indapamide] Rash    Norvasc [amlodipine]      Not tolerated    Tramadol Nausea Only     Medications:     Current Outpatient Medications on File Prior to Visit   Medication Sig Dispense Refill    aspirin (ECOTRIN) 81 MG EC tablet Take 1 tablet (81 mg total) by mouth once daily. 30 tablet 0    atorvastatin (LIPITOR) 80 MG tablet Take 80 mg by mouth.      butalbital-aspirin-caffeine -40 mg (FIORINAL) -40 mg Cap Take 1 capsule by mouth.      carvedilol (COREG) 25 MG tablet Take 1 tablet (25 mg total) by mouth 2 (two) times daily. 180 tablet 3    citalopram (CELEXA) 20 MG tablet TAKE 1 AND 1/2  TABLETS (30 MG TOTAL) ONCE DAILY. 135 tablet 1    dicyclomine (BENTYL) 10 MG capsule Take 10 mg by mouth.      doxazosin (CARDURA) 1 MG tablet Take 1 tablet (1 mg total) by mouth every evening. 90 tablet 3    ergocalciferol (VITAMIN D2) 50,000 unit Cap Take 1 capsule (50,000 Units total) by mouth every 7 days. 12 capsule 3    famotidine (PEPCID) 20 MG tablet  Take 1 tablet (20 mg total) by mouth once daily. 90 tablet 3    hydroxychloroquine (PLAQUENIL) 200 mg tablet TAKE 1 TABLET TWICE DAILY 180 tablet 1    isosorbide mononitrate (IMDUR) 60 MG 24 hr tablet Take 1 tablet (60 mg total) by mouth once daily. 90 tablet 3    latanoprost 0.005 % ophthalmic solution INSTILL 1 DROP INTO BOTH EYES EVERY DAY 3 Bottle 3    levothyroxine (SYNTHROID) 75 MCG tablet Take 1 tablet (75 mcg total) by mouth before breakfast. 90 tablet 3    metOLazone (ZAROXOLYN) 2.5 MG tablet Take 1 tablet (2.5 mg total) by mouth once daily. 90 tablet 3    NIFEdipine (PROCARDIA-XL) 60 MG (OSM) 24 hr tablet Take 1 tablet (60 mg total) by mouth 2 (two) times daily. 90 tablet 3    rosuvastatin (CRESTOR) 10 MG tablet Take 1 tablet (10 mg total) by mouth every evening. 90 tablet 3    calcipotriene 0.005 % sclp soln Apply 1 application topically 2 (two) times daily. 60 mL 3    clobetasol (TEMOVATE) 0.05 % external solution Apply topically 2 (two) times daily. 50 mL 2    furosemide (LASIX) 40 MG tablet Take 1 tablet (40 mg total) by mouth daily as needed (for weight gain, shortness of breath). 90 tablet 3    mometasone (ELOCON) 0.1 % ointment Apply topically once daily. To rashes for 14 days 45 g 3     No current facility-administered medications on file prior to visit.      Social History:     Social History     Tobacco Use    Smoking status: Former Smoker     Packs/day: 1.50     Years: 30.00     Pack years: 45.00     Types: Cigarettes     Start date:      Last attempt to quit: 3/13/1985     Years since quittin.5    Smokeless tobacco: Never Used   Substance Use Topics    Alcohol use: No     Family History:     Family History   Problem Relation Age of Onset    Heart attack Mother     Hypertension Mother     Heart disease Father     Hypertension Father     Diabetes Maternal Grandmother     Glaucoma Maternal Grandmother     Hypertension Sister     Kidney disease Brother     Kidney  "failure Brother         received donated kidney from sister    No Known Problems Daughter     Diabetes Son     Hypertension Brother     Diabetes Maternal Aunt     No Known Problems Maternal Grandfather     No Known Problems Paternal Grandmother     No Known Problems Paternal Grandfather     Acne Neg Hx     Eczema Neg Hx     Lupus Neg Hx     Psoriasis Neg Hx     Melanoma Neg Hx     Amblyopia Neg Hx     Blindness Neg Hx     Cataracts Neg Hx     Macular degeneration Neg Hx     Retinal detachment Neg Hx     Strabismus Neg Hx      Physical Exam:   /63 (BP Location: Left arm, Patient Position: Sitting, BP Method: Small (Automatic))   Pulse 61   Ht 5' 4" (1.626 m)   Wt 50.5 kg (111 lb 5.3 oz)   LMP  (LMP Unknown) Comment: pt had taken bp meds less than an hour ago  SpO2 98%   BMI 19.11 kg/m²      Constitutional: No apparent distress, conversant  HEENT: Sclera anicteric, extraocular movements intact  Neck: No jugular venous distension, no carotid bruits  CV: Regular rate and rhythm, no murmurs rubs or gallops, normal S1/S2  Pulm: Clear to auscultation bilaterally, no wheezes, rales, or ronchi  GI: Abdomen soft, nontender, nondistended, normoactive bowel sounds  Extremities: No lower extremity edema, warm with palpable pulses  Skin: No ecchymosis, erythema, or ulcers  Psych: Alert and oriented to person place location, appropriate affect  Neuro: No focal deficits    Labs:     Lab Results   Component Value Date     (L) 09/28/2018    K 3.6 09/28/2018    CL 99 09/28/2018    CO2 20 (L) 09/28/2018     (H) 09/28/2018    CREATININE 5.1 (H) 09/28/2018    ANIONGAP 13 09/28/2018     Lab Results   Component Value Date    AST 26 09/25/2018    ALT 11 09/25/2018    ALKPHOS 79 09/25/2018    BILITOT 0.2 09/25/2018    BILIDIR 0.1 02/01/2017    ALBUMIN 2.4 (L) 09/25/2018     Lab Results   Component Value Date    CALCIUM 8.5 (L) 09/28/2018    MG 1.9 09/25/2018    PHOS 4.7 (H) 06/04/2018     Lab " Results   Component Value Date    BNP 2,994 (H) 09/25/2018    BNP 2,097 (H) 05/23/2018    BNP 2,403 (H) 05/07/2018    Lab Results   Component Value Date    WBC 8.34 09/28/2018    HGB 9.2 (L) 09/28/2018    HCT 27.8 (L) 09/28/2018     (H) 09/28/2018    GRAN 6.7 09/28/2018    GRAN 80.5 (H) 09/28/2018     Lab Results   Component Value Date    INR 1.0 09/05/2018     Lab Results   Component Value Date    CHOL 181 08/28/2018    HDL 66 08/28/2018    LDLCALC 104.2 08/28/2018    TRIG 54 08/28/2018     Lab Results   Component Value Date    HGBA1C 5.2 09/25/2018     Lab Results   Component Value Date    TSH 0.954 09/05/2018    P0GSHPD 4.4 (L) 06/30/2017          Imaging:     EF   Date Value Ref Range Status   08/21/2018 60 55 - 65    04/02/2018 50 55 - 65    11/27/2017 55 55 - 65          Assessment:   1. HFpEF, EF 55-60% with ADHF.  2. CKD stage IV with worsening kidney function.   3. Hyperlipidemia.  4. Hypertension.   5. 45 pack year h/o smoking, quit 1994.  6. Moderate aortic stenosis.    Plan:     - Patient has fluid overload based on history, physical exam and Cxray.  - Recommend admission for ADHF, diuresing and optimal medical treatment with closer monitoring of her renal fucntion  - I have reviewed the Echo myself and with the Echo staff, her aortic valve has moderate stenosis and significant left ventricular hypertrophy.  The patient SOB and ADHF mostly from acute on chronic diastolic heart failure with worsening rich function. I do not believe valve replacement will change her outcomes.   - Recommend cardiology consult while in patient.  - Discussed with admitting physician Dr Dobbs.    Attending addendum to follow     Ric Du MD  Cardiology Fellow  Pager: [phone]        9/28/2018 11:47 AM    Follow-up:     Future Appointments   Date Time Provider Department Center   10/4/2018 12:30 PM Amarilys Johns MD [facility] MED CLN [address] PCW   10/11/2018  2:00 PM Amarilys Johns MD [facility] EDY [address]  [address] GENAO   10/30/2018  9:30 AM ANNUAL WELLNESS VISIT-NURSE PRACTITIONER, NOM 2 [facility] IM [address] PCW   10/30/2018 11:00 AM Amarilys Johns MD [facility] MED CLN [address] PCW   11/6/2018  9:00 AM Yeimi Burdick MD [facility] HEARTTX [address]

## 2018-09-28 NOTE — LETTER
September 28, 2018      Yeimi Burdick MD  [address]  [facility] [address]           [facility]  [address]  [facility] [address]  Phone: [phone]          Patient: Ewelina Arnold   MR Number: [id number]   YOB: 1945   Date of Visit: 9/28/2018       Dear Dr. Yeimi Burdick:    Thank you for referring Ewelina Arnold to me for evaluation. Attached you will find relevant portions of my assessment and plan of care.    If you have questions, please do not hesitate to call me. I look forward to following Ewelina Arnold along with you.    Sincerely,    Gerardo Wooten MD    Enclosure  CC:  No Recipients    If you would like to receive this communication electronically, please contact [email or web address] or [phone] to request more information on [email or web address] Link access.    For providers and/or their staff who would like to refer a patient to [facility], please contact us through our one-stop-shop provider referral line, [facility], at [phone].    If you feel you have received this communication in error or would no longer like to receive these types of communications, please e-mail [email or web address]

## 2018-09-29 PROBLEM — E55.9 VITAMIN D DEFICIENCY: Status: ACTIVE | Noted: 2018-09-29

## 2018-09-29 LAB
ALBUMIN SERPL BCP-MCNC: 2.1 G/DL
ANION GAP SERPL CALC-SCNC: 12 MMOL/L
AORTIC VALVE REGURGITATION: ABNORMAL
AORTIC VALVE STENOSIS: ABNORMAL
BILIRUB UR QL STRIP: NEGATIVE
BUN SERPL-MCNC: 118 MG/DL
CALCIUM SERPL-MCNC: 8.2 MG/DL
CHLORIDE SERPL-SCNC: 101 MMOL/L
CLARITY UR REFRACT.AUTO: CLEAR
CO2 SERPL-SCNC: 22 MMOL/L
COLOR UR AUTO: NORMAL
CREAT SERPL-MCNC: 5.4 MG/DL
CREAT UR-MCNC: 58 MG/DL
DIASTOLIC DYSFUNCTION: YES
EST. GFR  (AFRICAN AMERICAN): 8.5 ML/MIN/1.73 M^2
EST. GFR  (NON AFRICAN AMERICAN): 7.3 ML/MIN/1.73 M^2
ESTIMATED PA SYSTOLIC PRESSURE: 42.19
GLOBAL PERICARDIAL EFFUSION: ABNORMAL
GLUCOSE SERPL-MCNC: 90 MG/DL
GLUCOSE UR QL STRIP: NEGATIVE
HGB UR QL STRIP: NEGATIVE
KETONES UR QL STRIP: NEGATIVE
LEUKOCYTE ESTERASE UR QL STRIP: NEGATIVE
MAGNESIUM SERPL-MCNC: 1.8 MG/DL
MITRAL VALVE REGURGITATION: ABNORMAL
NITRITE UR QL STRIP: NEGATIVE
OSMOLALITY UR: 333 MOSM/KG
PH UR STRIP: 5 [PH] (ref 5–8)
PHOSPHATE SERPL-MCNC: 6 MG/DL
POTASSIUM SERPL-SCNC: 3.5 MMOL/L
PROT UR QL STRIP: NEGATIVE
RETIRED EF AND QEF - SEE NOTES: 60 (ref 55–65)
SODIUM SERPL-SCNC: 135 MMOL/L
SP GR UR STRIP: 1.01 (ref 1–1.03)
TRICUSPID VALVE REGURGITATION: ABNORMAL
URN SPEC COLLECT METH UR: NORMAL
UROBILINOGEN UR STRIP-ACNC: NEGATIVE EU/DL
UUN UR-MCNC: 295 MG/DL

## 2018-09-29 PROCEDURE — 99222 1ST HOSP IP/OBS MODERATE 55: CPT | Mod: ,,, | Performed by: INTERNAL MEDICINE

## 2018-09-29 PROCEDURE — 36415 COLL VENOUS BLD VENIPUNCTURE: CPT

## 2018-09-29 PROCEDURE — 93306 TTE W/DOPPLER COMPLETE: CPT

## 2018-09-29 PROCEDURE — 83935 ASSAY OF URINE OSMOLALITY: CPT

## 2018-09-29 PROCEDURE — A4216 STERILE WATER/SALINE, 10 ML: HCPCS | Performed by: HOSPITALIST

## 2018-09-29 PROCEDURE — 82570 ASSAY OF URINE CREATININE: CPT

## 2018-09-29 PROCEDURE — 25000003 PHARM REV CODE 250: Performed by: HOSPITALIST

## 2018-09-29 PROCEDURE — 63600175 PHARM REV CODE 636 W HCPCS: Performed by: HOSPITALIST

## 2018-09-29 PROCEDURE — 99233 SBSQ HOSP IP/OBS HIGH 50: CPT | Mod: ,,, | Performed by: HOSPITALIST

## 2018-09-29 PROCEDURE — 93306 TTE W/DOPPLER COMPLETE: CPT | Mod: 26,,, | Performed by: INTERNAL MEDICINE

## 2018-09-29 PROCEDURE — 11000001 HC ACUTE MED/SURG PRIVATE ROOM

## 2018-09-29 PROCEDURE — 80069 RENAL FUNCTION PANEL: CPT

## 2018-09-29 PROCEDURE — 84540 ASSAY OF URINE/UREA-N: CPT

## 2018-09-29 PROCEDURE — 83735 ASSAY OF MAGNESIUM: CPT

## 2018-09-29 PROCEDURE — 81003 URINALYSIS AUTO W/O SCOPE: CPT

## 2018-09-29 RX ORDER — ERGOCALCIFEROL 1.25 MG/1
50000 CAPSULE ORAL
Status: DISCONTINUED | OUTPATIENT
Start: 2018-09-29 | End: 2018-10-04 | Stop reason: HOSPADM

## 2018-09-29 RX ORDER — METOLAZONE 2.5 MG/1
2.5 TABLET ORAL DAILY
Status: DISCONTINUED | OUTPATIENT
Start: 2018-09-29 | End: 2018-10-01

## 2018-09-29 RX ADMIN — ASPIRIN 81 MG: 81 TABLET, COATED ORAL at 09:09

## 2018-09-29 RX ADMIN — CARVEDILOL 6.25 MG: 6.25 TABLET, FILM COATED ORAL at 09:09

## 2018-09-29 RX ADMIN — Medication 3 ML: at 02:09

## 2018-09-29 RX ADMIN — DOXAZOSIN 1 MG: 1 TABLET ORAL at 08:09

## 2018-09-29 RX ADMIN — CARVEDILOL 6.25 MG: 6.25 TABLET, FILM COATED ORAL at 08:09

## 2018-09-29 RX ADMIN — SODIUM BICARBONATE 650 MG: 325 TABLET ORAL at 09:09

## 2018-09-29 RX ADMIN — HYDROXYCHLOROQUINE SULFATE 200 MG: 200 TABLET, FILM COATED ORAL at 09:09

## 2018-09-29 RX ADMIN — LEVOTHYROXINE SODIUM 75 MCG: 75 TABLET ORAL at 06:09

## 2018-09-29 RX ADMIN — ISOSORBIDE MONONITRATE 60 MG: 30 TABLET, EXTENDED RELEASE ORAL at 09:09

## 2018-09-29 RX ADMIN — ERGOCALCIFEROL 50000 UNITS: 1.25 CAPSULE ORAL at 01:09

## 2018-09-29 RX ADMIN — METOLAZONE 2.5 MG: 2.5 TABLET ORAL at 01:09

## 2018-09-29 RX ADMIN — HYDROXYCHLOROQUINE SULFATE 200 MG: 200 TABLET, FILM COATED ORAL at 08:09

## 2018-09-29 RX ADMIN — SODIUM BICARBONATE 650 MG: 325 TABLET ORAL at 03:09

## 2018-09-29 RX ADMIN — FAMOTIDINE 20 MG: 20 TABLET ORAL at 09:09

## 2018-09-29 RX ADMIN — NIFEDIPINE 60 MG: 60 TABLET, FILM COATED, EXTENDED RELEASE ORAL at 08:09

## 2018-09-29 RX ADMIN — NIFEDIPINE 60 MG: 60 TABLET, FILM COATED, EXTENDED RELEASE ORAL at 09:09

## 2018-09-29 RX ADMIN — FUROSEMIDE 10 MG/HR: 10 INJECTION, SOLUTION INTRAMUSCULAR; INTRAVENOUS at 12:09

## 2018-09-29 RX ADMIN — SODIUM BICARBONATE 650 MG: 325 TABLET ORAL at 08:09

## 2018-09-29 RX ADMIN — Medication 3 ML: at 06:09

## 2018-09-29 RX ADMIN — ATORVASTATIN CALCIUM 80 MG: 20 TABLET, FILM COATED ORAL at 09:09

## 2018-09-29 NOTE — PROGRESS NOTES
"     Progress Note  Hospital Medicine     Patient Name: Ewelina Arnold  MRN:  [id number]  Hospital Medicine Team: [facility] HOSP MED C  Date of Admission:  9/28/2018     Principal Problem:  Acute on chronic diastolic congestive heart failure   Primary Care Physician: Amarilys Johns MD      History of Present Illness:     Ms. Ewelina Arnold is a 72 y.o. female with PMH of HFpEF with EF 55-60%, CKD stage IV with solitary kidney after donating a kidney, HLD/ HTN, Hypothyroidism, SLE, and moderate aortic stenosis with PABLITO = 1.12 cm2, admitted to hospital medicine as a direct admit from cardiology clinic (Dr Isabel) for ADHF and fluid retention. Patient was asked to stop her lasix 40mg BID approx 1 week ago due to worsening renal function. Since then, pt has experienced abdominal distension, SOB/ PEDRO while talking to the bathroom, as well as decreased urine output. Associated with mild cough, with no hemoptysis or fevers. In addition, patient underwent a treatment course with prednisone for SLE flair approx 3 weeks ago. No LE edema, CP, palpitations, lightheadedness, n/v.     In cardiology clinic, pt was found to have fluid overload with inspiratory crackles at lung bases and + JVD. Labs notable for Cr of 5.1 from 3.8-3.4 baseline, BNP of 3718 from 7501-5163 prior, and pulm edema on CXR. Weight is 114 which he stated is her "dry" weight. However, abd distension and SOB with minimal exertion prompted pt to be evaluated by her PCP and then Cardiology. Cardiology does not believe that aortic valve replacement for her mild stenosis would benefit the pt    Interval History  Admitted overnight and started on Lasix gtt.     Review of Systems   Constitutional: Negative for chill, fever. + fatigue  HENT: Negative for sore throat, trouble swallowing.    Eyes: Negative for photophobia, visual disturbance.   Respiratory: as per HPI  Cardiovascular: as per HPI  Gastrointestinal: Negative for abdominal pain, constipation, diarrhea, nausea, " vomiting.   Endocrine: Negative for cold intolerance, heat intolerance.   Musculoskeletal: Negative for arthralgias, myalgias.   Skin: Negative for rash, wound, erythema   Neurological: Negative for dizziness, syncope, weakness, light-headedness.   Psychiatric/Behavioral: Negative for confusion, hallucinations, anxiety  All other systems reviewed and are negative.    Physical Exam:     Vital Signs (Most Recent):  Temp: 97 °F (36.1 °C) (09/28/18 2022)  Pulse: (!) 59 (09/28/18 2022)  Resp: 18 (09/28/18 2022)  BP: (!) 143/64 (09/28/18 2022)  SpO2: (!) 94 % (09/28/18 2022) Vital Signs Range (Last 24H):  Temp:  [97 °F (36.1 °C)-97.3 °F (36.3 °C)]   Pulse:  [55-62]   Resp:  [18-20]   BP: (120-145)/(48-65)   SpO2:  [93 %-99 %]    Body mass index is 19.64 kg/m².     Physical Exam:  Constitutional: Well-developed, well-nourished, non-distressed, not diaphoretic.   HENT: NC/AT, external ears normal, oropharynx clear, MMM w/o exudates.   Eyes: PERRL, EOMI, conjunctiva normal, no discharge b/l, no scleral icterus   Neck: normal ROM, supple, (+) JVD, to jaw   CV: RRR, no m/r/g, no carotid bruits, +2 peripheral pulses.  Pulmonary/Chest wall: Breathing comfortably w/o distress, no rales appreciated  GI: Soft, non-tender, mildly -distended, (+) BS, (+) BM   Musculoskeletal: Normal ROM, no edema, no atrophy, no tenderness throughout   Neurological: AAO x 4, CN II-XI in tact, nl sensation, nl strength/tone   Skin: warm, dry, (-) erythema, (-) rash, (-)pallor, (-) acanthosis.   Psych: normal mood and affect, normal behavior, thought content and judgement.  Vitals reviewed.    Labs/Radiology:  I have reviewed pertinent labs/imaging in the past 24 hours.     Diagnostic Results:  Echo 8/21/2018  CONCLUSIONS     1 - Normal left ventricular systolic function (EF 60-65%).     2 - Concentric hypertrophy.     3 - Impaired LV relaxation, elevated LAP (grade 2 diastolic dysfunction).     4 - Normal right ventricular systolic function .     5 -  The estimated PA systolic pressure is 38 mmHg.     6 - Mild to moderate aortic regurgitation.     7 - Moderate mitral regurgitation.     8 - Mild to moderate tricuspid regurgitation.     9 - Trivial pericardial effusion.     10 - Severe left atrial enlargement.     11 - No wall motion abnormalities.     12 - Moderate aortic stenosis, PABLITO = 1.12 cm2, AVAi = 0.73 cm2/m2, peak velocity = 4.08 m/s, mean gradient = 33 mmHg. DI = 0.29, increased gradients appear to be related to concomitant Aortic regurgitation. Visually the valve is moderately stenotic as   well.       Assessment and Plan:     Ms. Ewelina Arnold is a 72 y.o. female with PMH of HFpEF with EF 55-60%, CKD stage IV with solitary kidney after donating a kidney, HLD/ HTN, Hypothyroidism, SLE, and moderate aortic stenosis with PABLITO = 1.12 cm2, admitted to hospital medicine as a direct admit from cardiology clinic (Dr Isabel) for ADHF, MARYAM on CKD st 5 and fluid retention    Active Hospital Problems    Diagnosis  POA    *Acute on chronic diastolic congestive heart failure [I50.33]  Yes     Priority: 1 - High     Grade 2 diastolic failure, systolic HF, aortic stenosis      Pulmonary edema cardiac cause [I50.1]  Yes     Priority: 1 - High     Mild-mod pleural effusion on XR today      Metabolic acidosis, normal anion gap (NAG) [E87.2]  Yes     Priority: 2     MARYAM (acute kidney injury) [N17.9]  Yes     Priority: 2     Solitary kidney, acquired [Z90.5]  Not Applicable     Priority: 2     CKD (chronic kidney disease), stage IV [N18.4]  Yes     Priority: 2      Donated kidney to her brother  1/23/2017 patient continues to follow-up with nephrology awaiting renal panel will contact patient results available.  We'll continue to monitor      Nonrheumatic aortic valve stenosis [I35.0]  Yes     Priority: 3      Aortic regurg and stenosis on recent echo, not eligible for TAVR (will not improve aortic valve function)      Anemia in stage 4 chronic kidney disease [N18.4,  "D63.1]  Yes     Priority: 4     Renovascular hypertension [I15.0]  Yes     Priority: 5     Hypovitaminosis D [E55.9]  Yes    GERD (gastroesophageal reflux disease) [K21.9]  Yes    Acquired hypothyroidism [E03.9]  Yes    Coronary artery disease due to calcified coronary lesion [I25.10, I25.84]  Yes    Other forms of systemic lupus erythematosus [M32.8]  Yes     Chronic      Resolved Hospital Problems   No resolved problems to display.       Acute on chronic diastolic congestive heart failure  Pulmonary edema cardiac cause  - BNP of 3718 from 4194-5606 prior, and pulm edema on CXR. Weight is 114 which he stated is her "dry" weight.  - volume retention is likely a component of worsening CKD and / or cardiorenal and/or recent prednisone use and /or recent lasix cessation   - received lasix 100mg IV push -->  lasix gtt at 10 mg/hr  - resume home metolazone  - strict I/O  - daily weights  - cont HF/ HTN meds  - repeat 2D echo  - cardiology consult  - nephrology consult    MARYAM (acute kidney injury) on CKD (chronic kidney disease), stage IV  Solitary kidney, acquired  Metabolic acidosis, normal anion gap (NAG)   - labs notable for Cr of 5.4 from 3-3.8 baseline  - lasix diuresis as above  - started on sodium bicarb 650 TID given bicarb of 19-20  - follow renal function panel   - check vit D, phos, PTH, UA and urine studies  - nephrology consult    Nonrheumatic aortic valve stenosis  - 8/2018 echo with Moderate aortic stenosis, PABLITO = 1.12 cm2, AVAi = 0.73 cm2/m2, peak velocity = 4.08 m/s, mean gradient = 33 mmHg. DI = 0.29, increased gradients appear to be related to concomitant Aortic regurgitation  - cardiology does not believe pt would benefit from AVR  - as above    Anemia in stage 4-5 chronic kidney disease  - hbg of 9, baseline  - anemia studies indicative of chronic disease, 2/2 renal failure    Renovascular hypertension   - decreased coreg 25mg BID to coreg 6.25 bid given HR of 50s  - cont home doxazosin   - " cont home isosorbide mononitrate 60mh  - cont home nifedipine 60mg BID    Hypovitaminosis D  - vit D low - ergocalciferol supplementation    GERD   - cont home famotidine    Acquired hypothyroidism  - cont home synthroid    Coronary artery disease due to calcified coronary lesion  - cont home ASA 81 and statin   - BB as above    Other forms of systemic lupus erythematosus  - cont home hydroxychloroquine 200mg bid    Diet:  Renal 1500 cc fluid restriction   DVT PPx:  heparin    Disposition:    Diuresing. Cardiology and nephro consults      Kaye Urias MD  [facility] Medicine Staff  [facility]

## 2018-09-29 NOTE — CONSULTS
[facility]  Nephrology  Consult Note    Patient Name: Ewelina Arnold  MRN: [id number]  Admission Date: 9/28/2018  Hospital Length of Stay: 1 days  Attending Provider: Kaye Urias MD   Primary Care Physician: Amarilys Johns MD  Principal Problem:Acute on chronic diastolic congestive heart failure    Consults  Subjective:     HPI: Ms. Ewelina Arnold is a 72 y.o. 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  Patient has had a slowly progressive decline in renal function over the last year . Serum crt 1.6 3/2018 and earlier this month 3.0-3.5 and today 5.4.  Patient presents with worsening SOB and abdominal distention of about a weeks duration.    No lE edema, noN&V, No fever, cough or sputum production.  No dysuria, hematuria, use of NSAIDs  Patient has been bolused and palced on lasix gtt, with about 600 cc UO at this time       Past Medical History:   Diagnosis Date    Acute hypoxemic respiratory failure 4/28/2018    Acute on chronic diastolic congestive heart failure 11/17/2017    Allergy     Anatomical narrow angle glaucoma     Anemia     States diagnosed about a month ago    Aortic atherosclerosis     Arthritis     Cataract     CHF (congestive heart failure)     Chronic kidney disease     Chronic sciatica of left side     Right lower back pain due to sciatica    Coronary artery disease     Depression     Dry eyes     GERD (gastroesophageal reflux disease)     History of colon cancer     Hyperaldosteronism     Hyperlipidemia     Hypertension     Hypothyroidism     Keloid cicatrix     Left ventricular diastolic dysfunction with preserved systolic  function     Lupus (systemic lupus erythematosus) 8/17/2012    Malnutrition 5/16/2017    Osteoarthritis of cervical spine 8/17/2012    Osteopenia     PAD (peripheral artery disease)     FRAN (renal artery stenosis)        Past Surgical History:   Procedure Laterality Date    BIOPSY-BONE MARROW Left 10/16/2017    Performed [facility] OR 2ND FLR    CARDIAC CATHETERIZATION  07/27/2011    x1    CHOLECYSTECTOMY  1999    COLON SURGERY  2000 & 2003    partial removal    COLONOSCOPY N/A 4/14/2016    Procedure: COLONOSCOPY;  Surgeon: Jose Steen MD;  Location: [facility] (4TH FLR);  Service: Endoscopy;  Laterality: N/A;  prep 2 days prior light meals only/clear liquid day before  and 4 dulcolax tabs (5 mgs) at noon    COLONOSCOPY N/A 9/14/2017    Procedure: COLONOSCOPY;  Surgeon: Jose Steen MD;  Location: [facility] (4TH FLR);  Service: Endoscopy;  Laterality: N/A;    COLONOSCOPY N/A 9/14/2017    Performed by [facility] at [facility] ENDO (4TH FLR)    COLONOSCOPY N/A 4/14/2016    Performed by [facility] at [facility] ENDO (4TH FLR)    COLONOSCOPY N/A 4/23/2013    Performed by Jose Steen MD at [facility] (4TH FLR)    TALIA-TRANSFORAMINAL Left 11/3/2016    [facility]    ESOPHAGOGASTRODUODENOSCOPY (EGD) N/A 3/16/2017    [facility]    EYE SURGERY      HAND SURGERY Bilateral 1996 & 1997    due to carpal tunnel     HERNIA REPAIR      HYSTERECTOMY  1983    INJECTION-STEROID-EPIDURAL-TRANSFORAMINAL Left 1/7/2016    [facility]    Left heart cath Left 12/20/2017    Performed by Chandan Ly [facility] at [facility] CATH LAB    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    OOPHORECTOMY      removed one    Peripheral Iridotomy both eyes  1994    laser angle correction    THYROIDECTOMY, PARTIAL  2006    to remove two nodules and one-half thyroid       Review of  patient's allergies indicates:   Allergen Reactions    Ace inhibitors Swelling     Lips Swelling    Hydralazine analogues Other (See Comments)     Lupus exacerbation    Vioxx [rofecoxib] Swelling      lips swelling    Bactrim [sulfamethoxazole-trimethoprim]      Pt would like this medication to be added due to elevation of creatinine with single kidney.    Clonidine Hallucinations     Hallucinations    Lactose     Arthrotec 50 [diclofenac-misoprostol]      Unknown    Bentyl [dicyclomine]      Not effective    Doxycycline Nausea Only    Lomotil [diphenoxylate-atropine] Nausea And Vomiting     Stomach cramps with vomitting    Lozol [indapamide] Rash    Norvasc [amlodipine]      Not tolerated    Tramadol Nausea Only     Current Facility-Administered Medications   Medication Frequency    acetaminophen tablet 650 mg Q4H PRN    aspirin EC tablet 81 mg Daily    atorvastatin tablet 80 mg Daily    carvedilol tablet 6.25 mg BID    doxazosin tablet 1 mg QHS    ergocalciferol capsule 50,000 Units Q7 Days    famotidine tablet 20 mg Daily    furosemide (LASIX) 2 mg/mL in sodium chloride 0.9% 100 mL infusion (conc: 2 mg/mL) Continuous    heparin (porcine) injection 5,000 Units Q8H    hydroxychloroquine tablet 200 mg BID    isosorbide mononitrate 24 hr tablet 60 mg Daily    levothyroxine tablet 75 mcg Before breakfast    metOLazone tablet 2.5 mg Daily    NIFEdipine 24 hr tablet 60 mg BID    ondansetron disintegrating tablet 4 mg Q8H PRN    ramelteon tablet 8 mg Nightly PRN    senna-docusate 8.6-50 mg per tablet 1 tablet BID PRN    sodium bicarbonate tablet 650 mg TID    sodium chloride 0.9% flush 3 mL Q8H     Family History     Problem Relation (Age of Onset)    Diabetes Maternal Grandmother, Son, Maternal Aunt    Glaucoma Maternal Grandmother    Heart attack Mother    Heart disease Father    Hypertension Mother, Father, Sister, Brother    Kidney disease Brother    Kidney failure Brother    No Known  Problems Daughter, Maternal Grandfather, Paternal Grandmother, Paternal Grandfather        Tobacco Use    Smoking status: Former Smoker     Packs/day: 1.50     Years: 30.00     Pack years: 45.00     Types: Cigarettes     Start date:      Last attempt to quit: 3/13/1985     Years since quittin.5    Smokeless tobacco: Never Used   Substance and Sexual Activity    Alcohol use: No    Drug use: No    Sexual activity: Not Currently     Partners: Male     Birth control/protection: Abstinence     Review of Systems   Respiratory: Positive for shortness of breath.      Objective:     Vital Signs (Most Recent):  Temp: 98.5 °F (36.9 °C) (18 1530)  Pulse: (!) 59 (18 1559)  Resp: 17 (18 0804)  BP: (!) 118/58 (18 1530)  SpO2: (!) 93 % (18 1530)  O2 Device (Oxygen Therapy): room air (18 1530) Vital Signs (24h Range):  Temp:  [97 °F (36.1 °C)-98.5 °F (36.9 °C)] 98.5 °F (36.9 °C)  Pulse:  [58-64] 59  Resp:  [17-20] 17  SpO2:  [91 %-97 %] 93 %  BP: (118-143)/(57-65) 118/58     Weight: 52 kg (114 lb 11.2 oz) (18 1213)  Body mass index is 19.69 kg/m².  Body surface area is 1.53 meters squared.    I/O last 3 completed shifts:  In: 120 [P.O.:120]  Out: 600 [Urine:600]    Physical Exam   Constitutional: She is oriented to person, place, and time. She appears well-developed and well-nourished. No distress.   Sitting upright in bed, able to speak easily   HENT:   Head: Normocephalic and atraumatic.   Mouth/Throat: No oropharyngeal exudate.   Eyes: Conjunctivae and EOM are normal. Pupils are equal, round, and reactive to light. Right eye exhibits no discharge. Left eye exhibits no discharge. No scleral icterus.   Neck: Normal range of motion. Neck supple. No JVD present. No tracheal deviation present. No thyromegaly present.   Cardiovascular: Normal rate, regular rhythm, normal heart sounds and intact distal pulses. Exam reveals no gallop.   No murmur heard.  2/6 JUJU  LSB  Harsh  No  peripheral edema.   Pulmonary/Chest: Effort normal and breath sounds normal. No stridor. No respiratory distress. She has no wheezes. She has no rales. She exhibits no tenderness.   No crackles, no wheeze   Abdominal: Soft. Bowel sounds are normal. She exhibits no distension and no mass. There is no tenderness. There is no rebound and no guarding. No hernia.   Musculoskeletal: She exhibits no edema or tenderness.   Lymphadenopathy:     She has no cervical adenopathy.   Neurological: She is alert and oriented to person, place, and time. She exhibits normal muscle tone.   Skin: Skin is warm and dry. No rash noted. She is not diaphoretic. No erythema.   Psychiatric: She has a normal mood and affect. Judgment and thought content normal.   Nursing note and vitals reviewed.      Significant Labs:  All labs within the past 24 hours have been reviewed.    Significant Imaging:  Labs: Reviewed  X-Ray: Reviewed  US: Reviewed  Echo: Reviewed    Assessment/Plan:     Hyponatremia    fluid restrict 1200 cc daily           Hypovitaminosis D    continue vit d  Weekly, 50,000 units.  May check pth and vit d level          Metabolic acidosis, normal anion gap (NAG)    Agree with sodium bicarb 650 mg tid, goal serum CO2  24-25  With diuresis  May need to follow vbg from time to time and watch for alkalosis        MARYAM (acute kidney injury)    Ms. Ewelina Arnold is a 72 y.o. 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    MARYAM superimposed on ckd 4 vs progression of CKD.    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   Patient has had a rather subacute  course with worsening renal function over the last year which appears to parallel her valvular disease and increasing need for diuretics.  MARYAM at least in aprt is likely related to CRS  would bladder scan and obtain renal Ultrasound given significant change.  UA no proteinuria nor hematuria suggests more prerenal/ CRS picture as well  Check Uc/s  Agree with diuresis  May need to add thiazide  As hydrodiuril IV  Or metolazone po.    Renal diet, but would liberalize potassium to 3 - 4 grams with diuresing  Agree with sevelamer for phosphate binder would try to avoid  Calcium binders            Anemia in stage 4 chronic kidney disease    Ferritin > 1000 may be an acute phase reactant with low Tsat   no iron for now but may consider again and retest in  1-2 weeks  If low would replenish before starting DENISHA            Thank you for your consult. I will follow-up with patient. Please contact us if you have any additional questions.    Corazon Whitman MD  Nephrology  [facility]

## 2018-09-29 NOTE — ASSESSMENT & PLAN NOTE
Agree with sodium bicarb 650 mg tid, goal serum CO2  24-25  With diuresis  May need to follow vbg from time to time and watch for alkalosis

## 2018-09-29 NOTE — SUBJECTIVE & OBJECTIVE
Past Medical History:   Diagnosis Date    Acute hypoxemic respiratory failure 4/28/2018    Acute on chronic diastolic congestive heart failure 11/17/2017    Allergy     Anatomical narrow angle glaucoma     Anemia     States diagnosed about a month ago    Aortic atherosclerosis     Arthritis     Cataract     CHF (congestive heart failure)     Chronic kidney disease     Chronic sciatica of left side     Right lower back pain due to sciatica    Coronary artery disease     Depression     Dry eyes     GERD (gastroesophageal reflux disease)     History of colon cancer     Hyperaldosteronism     Hyperlipidemia     Hypertension     Hypothyroidism     Keloid cicatrix     Left ventricular diastolic dysfunction with preserved systolic function     Lupus (systemic lupus erythematosus) 8/17/2012    Malnutrition 5/16/2017    Osteoarthritis of cervical spine 8/17/2012    Osteopenia     PAD (peripheral artery disease)     FRAN (renal artery stenosis)        Past Surgical History:   Procedure Laterality Date    BIOPSY-BONE MARROW Left 10/16/2017    Performed by [facility] at [facility] OR 2ND FLR    CARDIAC CATHETERIZATION  07/27/2011    x1    CHOLECYSTECTOMY  1999    COLON SURGERY  2000 & 2003    partial removal    COLONOSCOPY N/A 4/14/2016    Procedure: COLONOSCOPY;  Surgeon: Jose Steen MD;  Location: [facility] ([address] [place] [address]);  Service: Endoscopy;  Laterality: N/A;  prep 2 days prior light meals only/clear liquid day before  and 4 dulcolax tabs (5 mgs) at noon    COLONOSCOPY N/A 9/14/2017    Procedure: COLONOSCOPY;  Surgeon: Jose Steen MD;  Location: [facility] ([address] [place] [address]);  Service: Endoscopy;  Laterality: N/A;    COLONOSCOPY N/A 9/14/2017    Performed by Jose Steen MD at [facility] (4TH FLR)    COLONOSCOPY N/A 4/14/2016    Performed by Jose Steen MD at [facility] (4TH FLR)    COLONOSCOPY N/A 4/23/2013    Performed by Jose Steen MD at [facility] (4TH FLR)    TALIA-TRANSFORAMINAL  Left 11/3/2016    [facility]    ESOPHAGOGASTRODUODENOSCOPY (EGD) N/A 3/16/2017    [facility] ENDO    EYE SURGERY      HAND SURGERY Bilateral 1996 & 1997    due to carpal tunnel     HERNIA REPAIR      HYSTERECTOMY  1983    INJECTION-STEROID-EPIDURAL-TRANSFORAMINAL Left 1/7/2016    [facility]    Left heart cath Left 12/20/2017    [facility]    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    Clonidine Hallucinations     Hallucinations    Lactose     Arthrotec 50 [diclofenac-misoprostol]      Unknown    Bentyl [dicyclomine]      Not effective    Doxycycline Nausea Only    Lomotil [diphenoxylate-atropine] Nausea And Vomiting     Stomach cramps with vomitting    Lozol [indapamide] Rash    Norvasc [amlodipine]      Not tolerated    Tramadol Nausea Only     Current Facility-Administered Medications   Medication Frequency    acetaminophen tablet 650 mg Q4H PRN    aspirin EC tablet 81 mg Daily    atorvastatin tablet 80 mg Daily    carvedilol tablet 6.25 mg BID    doxazosin tablet 1 mg QHS    ergocalciferol capsule 50,000 Units Q7 Days    famotidine tablet 20 mg Daily    furosemide (LASIX) 2 mg/mL in sodium chloride 0.9% 100 mL infusion (conc: 2 mg/mL) Continuous    heparin (porcine) injection 5,000  Units Q8H    hydroxychloroquine tablet 200 mg BID    isosorbide mononitrate 24 hr tablet 60 mg Daily    levothyroxine tablet 75 mcg Before breakfast    metOLazone tablet 2.5 mg Daily    NIFEdipine 24 hr tablet 60 mg BID    ondansetron disintegrating tablet 4 mg Q8H PRN    ramelteon tablet 8 mg Nightly PRN    senna-docusate 8.6-50 mg per tablet 1 tablet BID PRN    sodium bicarbonate tablet 650 mg TID    sodium chloride 0.9% flush 3 mL Q8H     Family History     Problem Relation (Age of Onset)    Diabetes Maternal Grandmother, Son, Maternal Aunt    Glaucoma Maternal Grandmother    Heart attack Mother    Heart disease Father    Hypertension Mother, Father, Sister, Brother    Kidney disease Brother    Kidney failure Brother    No Known Problems Daughter, Maternal Grandfather, Paternal Grandmother, Paternal Grandfather        Tobacco Use    Smoking status: Former Smoker     Packs/day: 1.50     Years: 30.00     Pack years: 45.00     Types: Cigarettes     Start date:      Last attempt to quit: 3/13/1985     Years since quittin.5    Smokeless tobacco: Never Used   Substance and Sexual Activity    Alcohol use: No    Drug use: No    Sexual activity: Not Currently     Partners: Male     Birth control/protection: Abstinence     Review of Systems   Respiratory: Positive for shortness of breath.      Objective:     Vital Signs (Most Recent):  Temp: 98.5 °F (36.9 °C) (18 1530)  Pulse: (!) 59 (18 1559)  Resp: 17 (18 0804)  BP: (!) 118/58 (18 1530)  SpO2: (!) 93 % (18 1530)  O2 Device (Oxygen Therapy): room air (18 1530) Vital Signs (24h Range):  Temp:  [97 °F (36.1 °C)-98.5 °F (36.9 °C)] 98.5 °F (36.9 °C)  Pulse:  [58-64] 59  Resp:  [17-20] 17  SpO2:  [91 %-97 %] 93 %  BP: (118-143)/(57-65) 118/58     Weight: 52 kg (114 lb 11.2 oz) (18 1213)  Body mass index is 19.69 kg/m².  Body surface area is 1.53 meters squared.    I/O last 3 completed shifts:  In: 120  [P.O.:120]  Out: 600 [Urine:600]    Physical Exam   Constitutional: She is oriented to person, place, and time. She appears well-developed and well-nourished. No distress.   Sitting upright in bed, able to speak easily   HENT:   Head: Normocephalic and atraumatic.   Mouth/Throat: No oropharyngeal exudate.   Eyes: Conjunctivae and EOM are normal. Pupils are equal, round, and reactive to light. Right eye exhibits no discharge. Left eye exhibits no discharge. No scleral icterus.   Neck: Normal range of motion. Neck supple. No JVD present. No tracheal deviation present. No thyromegaly present.   Cardiovascular: Normal rate, regular rhythm, normal heart sounds and intact distal pulses. Exam reveals no gallop.   No murmur heard.  2/6 JUJU  LSB  Harsh  No peripheral edema.   Pulmonary/Chest: Effort normal and breath sounds normal. No stridor. No respiratory distress. She has no wheezes. She has no rales. She exhibits no tenderness.   No crackles, no wheeze   Abdominal: Soft. Bowel sounds are normal. She exhibits no distension and no mass. There is no tenderness. There is no rebound and no guarding. No hernia.   Musculoskeletal: She exhibits no edema or tenderness.   Lymphadenopathy:     She has no cervical adenopathy.   Neurological: She is alert and oriented to person, place, and time. She exhibits normal muscle tone.   Skin: Skin is warm and dry. No rash noted. She is not diaphoretic. No erythema.   Psychiatric: She has a normal mood and affect. Judgment and thought content normal.   Nursing note and vitals reviewed.      Significant Labs:  All labs within the past 24 hours have been reviewed.    Significant Imaging:  Labs: Reviewed  X-Ray: Reviewed  US: Reviewed  Echo: Reviewed

## 2018-09-29 NOTE — ASSESSMENT & PLAN NOTE
Ferritin > 1000 may be an acute phase reactant with low Tsat   no iron for now but may consider again and retest in  1-2 weeks  If low would replenish before starting DENISHA

## 2018-09-29 NOTE — H&P
"       History and Physical   Hospital Medicine     Patient Name: Ewelina Arnold  MRN:  [id number]  Hospital Medicine [place]: [facility] Marcela Srivastava MD  Date of Admission:  9/28/2018     Principal Problem:  Acute on chronic diastolic congestive heart failure   Primary Care Physician: Amarilys Johns MD      History of Present Illness:     Ms. Ewelina Arnold is a 72 y.o. female with PMH of HFpEF with EF 55-60%, CKD stage IV with solitary kidney after donating a kidney, HLD/ HTN, Hypothyroidism, SLE, and moderate aortic stenosis with PABLITO = 1.12 cm2, admitted to hospital medicine as a direct admit from cardiology clinic (Dr Isabel) for ADHF and fluid retention. Patient was asked to stop her lasix 40mg BID approx 1 week ago due to worsening renal function. Since then, pt has experienced abdominal distension, SOB/ PEDRO while talking to the bathroom, as well as decreased urine output. Associated with mild cough, with no hemoptysis or fevers. In addition, patient underwent a treatment course with prednisone for SLE flair approx 3 weeks ago. No LE edema, CP, palpitations, lightheadedness, n/v.     In cardiology clinic, pt was found to have fluid overload with inspiratory crackles at lung bases and + JVD. Labs notable for Cr of 5.1 from 3.8-3.4 baseline, BNP of 3718 from 9626-3939 prior, and pulm edema on CXR. Weight is 114 which he stated is her "dry" weight. However, abd distension and SOB with minimal exertion prompted pt to be evaluated by her PCP and then Cardiology. Cardiology does not believe that aortic valve replacement for her mild stenosis would benefit the pt    Review of Systems   Constitutional: Negative for chill, fever. + fatigue  HENT: Negative for sore throat, trouble swallowing.    Eyes: Negative for photophobia, visual disturbance.   Respiratory: as per HPI  Cardiovascular: as per HPI  Gastrointestinal: Negative for abdominal pain, constipation, diarrhea, nausea, vomiting.   Endocrine: Negative for " cold intolerance, heat intolerance.   Genitourinary: decreased UOP  Musculoskeletal: Negative for arthralgias, myalgias.   Skin: Negative for rash, wound, erythema   Neurological: Negative for dizziness, syncope, weakness, light-headedness.   Psychiatric/Behavioral: Negative for confusion, hallucinations, anxiety  All other systems reviewed and are negative.      Past Medical History:   Past Medical History:   Diagnosis Date    Acute hypoxemic respiratory failure 4/28/2018    Acute on chronic diastolic congestive heart failure 11/17/2017    Allergy     Anatomical narrow angle glaucoma     Anemia     States diagnosed about a month ago    Aortic atherosclerosis     Arthritis     Cataract     CHF (congestive heart failure)     Chronic kidney disease     Chronic sciatica of left side     Right lower back pain due to sciatica    Coronary artery disease     Depression     Dry eyes     GERD (gastroesophageal reflux disease)     History of colon cancer     Hyperaldosteronism     Hyperlipidemia     Hypertension     Hypothyroidism     Keloid cicatrix     Left ventricular diastolic dysfunction with preserved systolic function     Lupus (systemic lupus erythematosus) 8/17/2012    Malnutrition 5/16/2017    Osteoarthritis of cervical spine 8/17/2012    Osteopenia     PAD (peripheral artery disease)     FRAN (renal artery stenosis)        Past Surgical History:   Past Surgical History:   Procedure Laterality Date    BIOPSY-BONE MARROW Left 10/16/2017    Performed by Grant Santillan MD at [facility] OR [address] [place] [address]    CARDIAC CATHETERIZATION  07/27/2011    x1    CHOLECYSTECTOMY  1999    COLON SURGERY  2000 & 2003    partial removal    COLONOSCOPY N/A 4/14/2016    Procedure: COLONOSCOPY;  Surgeon: Jose Steen MD;  Location: [facility] ([address] [place] [address]);  Service: Endoscopy;  Laterality: N/A;  prep 2 days prior light meals only/clear liquid day before  and 4 dulcolax tabs (5 mgs) at noon    COLONOSCOPY N/A 9/14/2017     Procedure: COLONOSCOPY;  Surgeon: Jose Steen MD;  Location: [facility] (4TH FLR);  Service: Endoscopy;  Laterality: N/A;    COLONOSCOPY N/A 2017    Performed by Jose [facility] at [facility] ENDO (4TH FLR)    COLONOSCOPY N/A 2016    Performed by Jose Steen MD at [facility] ENDO (4TH FLR)    COLONOSCOPY N/A 2013    Performed by Jose [facility] MD at [facility] (4TH FLR)    TALIA-TRANSFORAMINAL Left 11/3/2016    [facility] at [facility]    ESOPHAGOGASTRODUODENOSCOPY (EGD) N/A 3/16/2017    [facility] ENDO    EYE SURGERY      HAND SURGERY Bilateral  &     due to carpal tunnel     HERNIA REPAIR      HYSTERECTOMY      INJECTION-STEROID-EPIDURAL-TRANSFORAMINAL Left 2016    Performed by Brett [facility] at [facility]    Left heart cath Left 2017    Performed by Chandan Ly MD at [facility] CATH LAB    NEPHRECTOMY      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    OOPHORECTOMY      removed one    Peripheral Iridotomy both eyes      laser angle correction    THYROIDECTOMY, PARTIAL      to remove two nodules and one-half thyroid       Social History:   Social History     Tobacco Use    Smoking status: Former Smoker     Packs/day: 1.50     Years: 30.00     Pack years: 45.00     Types: Cigarettes     Start date:      Last attempt to quit: 3/13/1985     Years since quittin.5    Smokeless tobacco: Never Used   Substance Use Topics    Alcohol use: No    Drug use: No       Family History:   Family History   Problem Relation Age of Onset    Heart attack Mother     Hypertension Mother     Heart disease Father     Hypertension Father     Diabetes Maternal Grandmother     Glaucoma Maternal Grandmother     Hypertension Sister     Kidney disease Brother     Kidney failure Brother         received donated kidney from sister    No Known Problems Daughter     Diabetes Son     Hypertension Brother      Diabetes Maternal Aunt     No Known Problems Maternal Grandfather     No Known Problems Paternal Grandmother     No Known Problems Paternal Grandfather     Acne Neg Hx     Eczema Neg Hx     Lupus Neg Hx     Psoriasis Neg Hx     Melanoma Neg Hx     Amblyopia Neg Hx     Blindness Neg Hx     Cataracts Neg Hx     Macular degeneration Neg Hx     Retinal detachment Neg Hx     Strabismus Neg Hx          Medications: Scheduled Meds:   [START ON 9/29/2018] aspirin  81 mg Oral Daily    [START ON 9/29/2018] atorvastatin  80 mg Oral Daily    carvedilol  6.25 mg Oral BID    doxazosin  1 mg Oral QHS    [START ON 9/29/2018] famotidine  20 mg Oral Daily    furosemide  100 mg Intravenous Once    heparin (porcine)  5,000 Units Subcutaneous Q8H    hydroxychloroquine  200 mg Oral BID    [START ON 9/29/2018] isosorbide mononitrate  60 mg Oral Daily    [START ON 9/29/2018] levothyroxine  75 mcg Oral Before breakfast    NIFEdipine  60 mg Oral BID    sodium bicarbonate  650 mg Oral TID    sodium chloride 0.9%  3 mL Intravenous Q8H     Continuous Infusions:   furosemide (LASIX) 2 mg/mL infusion (non-titrating)       PRN Meds:.acetaminophen, ondansetron, senna-docusate 8.6-50 mg    Allergies: Patient is allergic to ace inhibitors; hydralazine analogues; vioxx [rofecoxib]; bactrim [sulfamethoxazole-trimethoprim]; clonidine; lactose; arthrotec 50 [diclofenac-misoprostol]; bentyl [dicyclomine]; doxycycline; lomotil [diphenoxylate-atropine]; lozol [indapamide]; norvasc [amlodipine]; and tramadol.    Physical Exam:     Vital Signs (Most Recent):  Temp: 97 °F (36.1 °C) (09/28/18 2022)  Pulse: (!) 59 (09/28/18 2022)  Resp: 18 (09/28/18 2022)  BP: (!) 143/64 (09/28/18 2022)  SpO2: (!) 94 % (09/28/18 2022) Vital Signs Range (Last 24H):  Temp:  [97 °F (36.1 °C)-97.3 °F (36.3 °C)]   Pulse:  [55-62]   Resp:  [18-20]   BP: (120-145)/(48-65)   SpO2:  [93 %-99 %]    Body mass index is 19.64 kg/m².     Physical  Exam:  Constitutional: Well-developed, well-nourished, non-distressed, not diaphoretic.   HENT: NC/AT, external ears normal, oropharynx clear, MMM w/o exudates.   Eyes: PERRL, EOMI, conjunctiva normal, no discharge b/l, no scleral icterus   Neck: normal ROM, supple, (+) JVD, to jaw   CV: RRR, no m/r/g, no carotid bruits, +2 peripheral pulses.  Pulmonary/Chest wall: Breathing comfortably w/o distress, inspiratory crackles at lung bases bilat   GI: Soft, non-tender, mildly -distended, (+) BS, (+) BM   Musculoskeletal: Normal ROM, no edema, no atrophy, no tenderness throughout   Neurological: AAO x 4, CN II-XI in tact, nl sensation, nl strength/tone   Skin: warm, dry, (-) erythema, (-) rash, (-)pallor, (-) acanthosis.   Psych: normal mood and affect, normal behavior, thought content and judgement.  Vitals reviewed.    Labs:    Cardiac Enzymes: Ejection Fractions:    No results for input(s): CPK, CPKMB, MB, TROPONINI in the last 72 hours. EF   Date Value Ref Range Status   08/21/2018 60 55 - 65    04/02/2018 50 55 - 65           Chemistries:   Recent Labs   Lab  09/25/18   1300  09/28/18 0940 09/28/18 1951   NA  134*  132*   --    K  4.0  3.6   --    CL  102  99   --    CO2  19*  20*   --    BUN  93*  115*   --    CREATININE  4.5*  5.1*   --    CALCIUM  8.6*  8.5*   --    PROT  6.3   --    --    BILITOT  0.2   --    --    ALKPHOS  79   --    --    ALT  11   --    --    AST  26   --    --    MG  1.9   --    --    PHOS   --    --   5.6*        CBC/Anemia Labs: Coags:    Recent Labs   Lab  09/25/18   1300  09/28/18 0940 09/28/18 1951   WBC  5.99  8.34   --    HGB  9.0*  9.2*   --    HCT  27.5*  27.8*   --    PLT  336  366*   --    MCV  82  81*   --    RDW  13.3  13.3   --    IRON   --    --   19*   FERRITIN  884*   --    --     No results for input(s): PT, INR, APTT in the last 168 hours.     POCT Glucose: HbA1c:    No results for input(s): POCTGLUCOSE in the last 168 hours. Hemoglobin A1C   Date Value Ref Range  Status   09/25/2018 5.2 4.0 - 5.6 % Final     Comment:   01/24/2017 5.7 4.5 - 6.2 % Final     Comment:   06/17/2015 5.8 4.5 - 6.2 % Final        Diagnostic Results:  Echo 8/21/2018  CONCLUSIONS     1 - Normal left ventricular systolic function (EF 60-65%).     2 - Concentric hypertrophy.     3 - Impaired LV relaxation, elevated LAP (grade 2 diastolic dysfunction).     4 - Normal right ventricular systolic function .     5 - The estimated PA systolic pressure is 38 mmHg.     6 - Mild to moderate aortic regurgitation.     7 - Moderate mitral regurgitation.     8 - Mild to moderate tricuspid regurgitation.     9 - Trivial pericardial effusion.     10 - Severe left atrial enlargement.     11 - No wall motion abnormalities.     12 - Moderate aortic stenosis, PABLITO = 1.12 cm2, AVAi = 0.73 cm2/m2, peak velocity = 4.08 m/s, mean gradient = 33 mmHg. DI = 0.29, increased gradients appear to be related to concomitant Aortic regurgitation. Visually the valve is moderately stenotic as   well.       Assessment and Plan:     Ms. Ewelina Arnold is a 72 y.o. female with PMH of HFpEF with EF 55-60%, CKD stage IV with solitary kidney after donating a kidney, HLD/ HTN, Hypothyroidism, SLE, and moderate aortic stenosis with PABLITO = 1.12 cm2, admitted to hospital medicine as a direct admit from cardiology clinic (Dr Isabel) for ADHF, MARYAM on CKD st 5 and fluid retention    Active Hospital Problems    Diagnosis  POA    *Acute on chronic diastolic congestive heart failure [I50.33]  Yes     Priority: 1 - High     Grade 2 diastolic failure, systolic HF, aortic stenosis      Pulmonary edema cardiac cause [I50.1]  Yes     Priority: 1 - High     Mild-mod pleural effusion on XR today      Metabolic acidosis, normal anion gap (NAG) [E87.2]  Yes     Priority: 2     MARYAM (acute kidney injury) [N17.9]  Yes     Priority: 2     Solitary kidney, acquired [Z90.5]  Not Applicable     Priority: 2     CKD (chronic kidney disease), stage IV [N18.4]  Yes      "Priority: 2      Donated kidney to her brother  1/23/2017 patient continues to follow-up with nephrology awaiting renal panel will contact patient results available.  We'll continue to monitor      Nonrheumatic aortic valve stenosis [I35.0]  Yes     Priority: 3      Aortic regurg and stenosis on recent echo, not eligible for TAVR (will not improve aortic valve function)      Anemia in stage 4 chronic kidney disease [N18.4, D63.1]  Yes     Priority: 4     Renovascular hypertension [I15.0]  Yes     Priority: 5     Hypovitaminosis D [E55.9]  Yes    GERD (gastroesophageal reflux disease) [K21.9]  Yes    Acquired hypothyroidism [E03.9]  Yes    Coronary artery disease due to calcified coronary lesion [I25.10, I25.84]  Yes    Other forms of systemic lupus erythematosus [M32.8]  Yes     Chronic      Resolved Hospital Problems   No resolved problems to display.       Acute on chronic diastolic congestive heart failure  Pulmonary edema cardiac cause  - BNP of 3718 from 4604-0904 prior, and pulm edema on CXR. Weight is 114 which he stated is her "dry" weight.  - volume retention is likely a component of worsening CKD and / or cardiorenal and/or recent prednisone use and /or recent lasix cessation   - lasix 100mg IV push -->  lasix gtt at 10 mg/hr  - strict I/O  - daily weights  - cont HF/ HTN meds  - repeat 2 D echo in the AM  - cardiology consult in the AM (not placed)  - nephrology consult in the AM    MARYAM (acute kidney injury) on CKD (chronic kidney disease), stage IV  Solitary kidney, acquired  Metabolic acidosis, normal anion gap (NAG)   - Labs notable for Cr of 5.1 from 3.8-3.4 baseline  - lasix diuresis as above  - started on sodium bicarb 650 TID given bicarb of 19-20  - check renal function panel   - check vit D, phos, PTH, UA and urine studies  - nephrology consult in the AM    Nonrheumatic aortic valve stenosis  - 8/2018 echo with Moderate aortic stenosis, PABLITO = 1.12 cm2, AVAi = 0.73 cm2/m2, peak velocity = " 4.08 m/s, mean gradient = 33 mmHg. DI = 0.29, increased gradients appear to be related to concomitant Aortic regurgitation  - cardiology does not believe pt would benefit from AVR  - as above    Anemia in stage 4-5 chronic kidney disease  - hbg of 9, baseline  - check iron studies    Renovascular hypertension   - decreased coreg 25mg BID to coreg 6.25 bid given HR of 50s  - cont home doxazosin   - cont home isosorbide mononitrate 60mh  - cont home nifedipine 60mg BID    Hypovitaminosis D  - check vit D level and PTH, on weekly replacement    GERD   - cont home famotidine    Acquired hypothyroidism  - cont home synthroid    Coronary artery disease due to calcified coronary lesion  - cont home ASA 81 and statin   - BB as above    Other forms of systemic lupus erythematosus  - cont home hydroxychloroquine 200mg bid    Diet:  Renal 1500 cc fluid restriction   GI PPx:  Home famotidine  DVT PPx:  heparin  Goals of Care:  Admitted     High Risk Conditions:  Patient has a condition that poses threat to life and bodily function: Acute Renal Failure./ ADHF  Patient is currently receiving parenteral controlled substances: lasix gtt     Disposition:    Admitted   Cardiology and nephro consults    Signing Physician:     Marcela Srivastava MD  Hospital Medicine Department  [facility]  Pager [phone] Tqrgrnk [phone]  9/28/2018

## 2018-09-29 NOTE — HPI
Ms. Ewelina Arnold is a 72 y.o. 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  Patient has had a slowly progressive decline in renal function over the last year . Serum crt 1.6 3/2018 and earlier this month 3.0-3.5 and today 5.4.  Patient presents with worsening SOB and abdominal distention of about a weeks duration.    No lE edema, noN&V, No fever, cough or sputum production.  No dysuria, hematuria, use of NSAIDs  Patient has been bolused and palced on lasix gtt, with about 600 cc UO at this time

## 2018-09-29 NOTE — ASSESSMENT & PLAN NOTE
Ms. Ewelina Arnold is a 72 y.o. 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    MARYAM superimposed on ckd 4 vs progression of CKD.    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   Patient has had a rather subacute course with worsening renal function over the last year which appears to parallel her valvular disease and increasing need for diuretics.  MARYAM at least in aprt is likely related to CRS  would bladder scan and obtain renal Ultrasound given significant change.  UA no proteinuria nor hematuria suggests more prerenal/ CRS picture as well  Check Uc/s  Agree with diuresis  May need to add thiazide  As hydrodiuril IV  Or metolazone po.    Renal diet, but would liberalize potassium to 3 - 4 grams with diuresing  Agree with sevelamer for phosphate binder would try to avoid  Calcium binders

## 2018-09-29 NOTE — PLAN OF CARE
Problem: Patient Care Overview  Goal: Plan of Care Review  Outcome: Ongoing (interventions implemented as appropriate)   09/29/18 0311   Coping/Psychosocial   Plan Of Care Reviewed With patient       Problem: Fall Risk (Adult)  Goal: Absence of Falls  Patient will demonstrate the desired outcomes by discharge/transition of care.  Outcome: Ongoing (interventions implemented as appropriate)   09/29/18 0311   Fall Risk (Adult)   Absence of Falls making progress toward outcome

## 2018-09-30 LAB
ALBUMIN SERPL BCP-MCNC: 2.1 G/DL
ANION GAP SERPL CALC-SCNC: 15 MMOL/L
BASOPHILS # BLD AUTO: 0.04 K/UL
BASOPHILS NFR BLD: 0.5 %
BUN SERPL-MCNC: 118 MG/DL
CALCIUM SERPL-MCNC: 8.2 MG/DL
CHLORIDE SERPL-SCNC: 100 MMOL/L
CO2 SERPL-SCNC: 19 MMOL/L
CREAT SERPL-MCNC: 5.6 MG/DL
DIFFERENTIAL METHOD: ABNORMAL
EOSINOPHIL # BLD AUTO: 0.1 K/UL
EOSINOPHIL NFR BLD: 1.7 %
ERYTHROCYTE [DISTWIDTH] IN BLOOD BY AUTOMATED COUNT: 13.3 %
EST. GFR  (AFRICAN AMERICAN): 8.1 ML/MIN/1.73 M^2
EST. GFR  (NON AFRICAN AMERICAN): 7 ML/MIN/1.73 M^2
GLUCOSE SERPL-MCNC: 87 MG/DL
HCT VFR BLD AUTO: 25.2 %
HGB BLD-MCNC: 8.1 G/DL
IMM GRANULOCYTES # BLD AUTO: 0.03 K/UL
IMM GRANULOCYTES NFR BLD AUTO: 0.4 %
LYMPHOCYTES # BLD AUTO: 0.8 K/UL
LYMPHOCYTES NFR BLD: 9.9 %
MAGNESIUM SERPL-MCNC: 1.8 MG/DL
MCH RBC QN AUTO: 26.7 PG
MCHC RBC AUTO-ENTMCNC: 32.1 G/DL
MCV RBC AUTO: 83 FL
MONOCYTES # BLD AUTO: 1 K/UL
MONOCYTES NFR BLD: 12.7 %
NEUTROPHILS # BLD AUTO: 5.8 K/UL
NEUTROPHILS NFR BLD: 74.8 %
NRBC BLD-RTO: 0 /100 WBC
PHOSPHATE SERPL-MCNC: 5.7 MG/DL
PLATELET # BLD AUTO: 371 K/UL
PMV BLD AUTO: 10 FL
POTASSIUM SERPL-SCNC: 3.3 MMOL/L
RBC # BLD AUTO: 3.03 M/UL
SODIUM SERPL-SCNC: 134 MMOL/L
WBC # BLD AUTO: 7.77 K/UL

## 2018-09-30 PROCEDURE — 99233 SBSQ HOSP IP/OBS HIGH 50: CPT | Mod: ,,, | Performed by: HOSPITALIST

## 2018-09-30 PROCEDURE — 85025 COMPLETE CBC W/AUTO DIFF WBC: CPT

## 2018-09-30 PROCEDURE — 36415 COLL VENOUS BLD VENIPUNCTURE: CPT

## 2018-09-30 PROCEDURE — 11000001 HC ACUTE MED/SURG PRIVATE ROOM

## 2018-09-30 PROCEDURE — A4216 STERILE WATER/SALINE, 10 ML: HCPCS | Performed by: HOSPITALIST

## 2018-09-30 PROCEDURE — 25000003 PHARM REV CODE 250: Performed by: HOSPITALIST

## 2018-09-30 PROCEDURE — 63600175 PHARM REV CODE 636 W HCPCS: Performed by: HOSPITALIST

## 2018-09-30 PROCEDURE — 99232 SBSQ HOSP IP/OBS MODERATE 35: CPT | Mod: ,,, | Performed by: INTERNAL MEDICINE

## 2018-09-30 PROCEDURE — G8978 MOBILITY CURRENT STATUS: HCPCS | Mod: CJ

## 2018-09-30 PROCEDURE — 80069 RENAL FUNCTION PANEL: CPT

## 2018-09-30 PROCEDURE — 97162 PT EVAL MOD COMPLEX 30 MIN: CPT

## 2018-09-30 PROCEDURE — G8979 MOBILITY GOAL STATUS: HCPCS | Mod: CI

## 2018-09-30 PROCEDURE — 99233 SBSQ HOSP IP/OBS HIGH 50: CPT | Mod: GC,,, | Performed by: INTERNAL MEDICINE

## 2018-09-30 PROCEDURE — 83735 ASSAY OF MAGNESIUM: CPT

## 2018-09-30 RX ORDER — LATANOPROST 50 UG/ML
1 SOLUTION/ DROPS OPHTHALMIC DAILY
Status: DISCONTINUED | OUTPATIENT
Start: 2018-09-30 | End: 2018-10-04 | Stop reason: HOSPADM

## 2018-09-30 RX ORDER — POTASSIUM CHLORIDE 20 MEQ/1
40 TABLET, EXTENDED RELEASE ORAL
Status: DISCONTINUED | OUTPATIENT
Start: 2018-09-30 | End: 2018-09-30

## 2018-09-30 RX ORDER — LANOLIN ALCOHOL/MO/W.PET/CERES
400 CREAM (GRAM) TOPICAL 2 TIMES DAILY
Status: COMPLETED | OUTPATIENT
Start: 2018-09-30 | End: 2018-09-30

## 2018-09-30 RX ADMIN — ACETAMINOPHEN 650 MG: 325 TABLET ORAL at 04:09

## 2018-09-30 RX ADMIN — FAMOTIDINE 20 MG: 20 TABLET ORAL at 09:09

## 2018-09-30 RX ADMIN — SODIUM BICARBONATE 650 MG: 325 TABLET ORAL at 09:09

## 2018-09-30 RX ADMIN — HYDROXYCHLOROQUINE SULFATE 200 MG: 200 TABLET, FILM COATED ORAL at 09:09

## 2018-09-30 RX ADMIN — MAGNESIUM OXIDE TAB 400 MG (241.3 MG ELEMENTAL MG) 400 MG: 400 (241.3 MG) TAB at 09:09

## 2018-09-30 RX ADMIN — ISOSORBIDE MONONITRATE 60 MG: 30 TABLET, EXTENDED RELEASE ORAL at 09:09

## 2018-09-30 RX ADMIN — HEPARIN SODIUM 5000 UNITS: 5000 INJECTION, SOLUTION INTRAVENOUS; SUBCUTANEOUS at 09:09

## 2018-09-30 RX ADMIN — DOXAZOSIN 1 MG: 1 TABLET ORAL at 09:09

## 2018-09-30 RX ADMIN — NIFEDIPINE 60 MG: 60 TABLET, FILM COATED, EXTENDED RELEASE ORAL at 09:09

## 2018-09-30 RX ADMIN — ATORVASTATIN CALCIUM 80 MG: 20 TABLET, FILM COATED ORAL at 09:09

## 2018-09-30 RX ADMIN — LATANOPROST 1 DROP: 50 SOLUTION OPHTHALMIC at 01:09

## 2018-09-30 RX ADMIN — FUROSEMIDE 10 MG/HR: 10 INJECTION, SOLUTION INTRAMUSCULAR; INTRAVENOUS at 08:09

## 2018-09-30 RX ADMIN — CARVEDILOL 6.25 MG: 6.25 TABLET, FILM COATED ORAL at 09:09

## 2018-09-30 RX ADMIN — METOLAZONE 2.5 MG: 2.5 TABLET ORAL at 09:09

## 2018-09-30 RX ADMIN — SODIUM BICARBONATE 650 MG: 325 TABLET ORAL at 03:09

## 2018-09-30 RX ADMIN — Medication 3 ML: at 06:09

## 2018-09-30 RX ADMIN — HEPARIN SODIUM 5000 UNITS: 5000 INJECTION, SOLUTION INTRAVENOUS; SUBCUTANEOUS at 03:09

## 2018-09-30 RX ADMIN — Medication 3 ML: at 03:09

## 2018-09-30 RX ADMIN — ASPIRIN 81 MG: 81 TABLET, COATED ORAL at 09:09

## 2018-09-30 RX ADMIN — LEVOTHYROXINE SODIUM 75 MCG: 75 TABLET ORAL at 06:09

## 2018-09-30 NOTE — ASSESSMENT & PLAN NOTE
71 yo female with HFpEF, CKD IV, HTN, HLD, hypothyroidism, SLE, moderate aortic stenosis who came in with worsening dyspnea chest xray and BNP c/w ADHF.    -Physical exam and lab findings c/w ADHF likely due to interruption of Lasix vs fluid retention from prednisone use for SLE flair  -2D echo with normal EF and Grade 2 diastolic HF. No evidence of pericarditis/significant effusion.   -Not hypervolemic on physical exam though chest xray with evidence of congestion   -Worsening renal function, likely a component of cardiorenal syndrome exist but suspect MARYAM is driving symptoms. Agree with nephrology work up for MARYAM.   -Per pt dry weight 112 lb,current weight 114.  Agree with Diuresis.

## 2018-09-30 NOTE — ASSESSMENT & PLAN NOTE
Ms. Ewelina Arnold is a 72 y.o. 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    MARYAM superimposed on ckd 4 vs progression of CKD.    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   Patient has had a rather subacute course with worsening renal function over the last year which appears to parallel her valvular disease and increasing need for diuretics.  MARYAM at least in part is likely related to CRS  would bladder scan and obtain renal Ultrasound given significant change.  UA no proteinuria nor hematuria suggests more prerenal/ CRS picture as well  Check Uc/s  Diuresing about 400- 500 cc /shift  clinicially she appears dry though CXR r read as pulmonary edema   May need to add thiazide  As hydrodiuril IV  Or metolazone po.    Suggest pulmonary evaluation  As she may have pulmonary disease contributing to SOB    If no improvement in renal function will likely require initiation of dialysis on this admission    Hypokalemia:   she evidently had been diagnosed with hyperaldosteronism as an outpatient   I am very leary about  Placing her on K sparing diuretic with worsening MARYAM/CKD   would liberalize diet at this time  Renal diet, but would liberalize potassium to 3 - 4 grams with diuresing    Agree with sevelamer for phosphate binder would try to avoid  Calcium binders

## 2018-09-30 NOTE — CONSULTS
"[facility]  Cardiology  Consult Note    Patient Name: Ewelina Arnold  MRN: [id number]  Admission Date: 9/28/2018  Hospital Length of Stay: 2 days  Code Status: Full Code   Attending Provider: Kaye Urias MD   Consulting Provider: Sanchez Mcdermott MD  Primary Care Physician: Amarilys Johns MD  Principal Problem:Acute on chronic diastolic congestive heart failure    Patient information was obtained from patient, relative(s), past medical records and ER records.     Consults  Subjective:     Chief Complaint:  ADHF     HPI:  Ms. Ewelina Arnold is a 72 y.o. 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 Patient was asked to stop her lasix 40mg BID approx 1 week ago due to worsening renal function. Since then, pt has experienced abdominal distension, SOB/ PEDRO while talking to the bathroom, as well as decreased urine output. Associated with mild cough, with no hemoptysis or fevers. In addition, patient underwent a treatment course with prednisone for SLE flair approx 3 weeks ago. No LE edema, CP, palpitations, lightheadedness, n/v.      In cardiology clinic, pt was found to have fluid overload with inspiratory crackles at lung bases and + JVD. Labs notable for Cr of 5.1 from 3.8-3.4 baseline, BNP of 3718 from 0513-3942 prior, and pulm edema on CXR. Weight is 114 which he stated is her "dry" weight. However, abd distension and SOB with minimal exertion prompted pt to be evaluated by her PCP and then Cardiology. Cardiology does not believe that aortic valve replacement for her mild stenosis would benefit the pt    Pt is currently on Lasix gtt (10 mg/hr) and metolazone 2.5 mg. Net negative 1.4 lt since admission. Pt feels better, sob breath improving.     Past " Medical History:   Diagnosis Date    Acute hypoxemic respiratory failure 4/28/2018    Acute on chronic diastolic congestive heart failure 11/17/2017    Allergy     Anatomical narrow angle glaucoma     Anemia     States diagnosed about a month ago    Aortic atherosclerosis     Arthritis     Cataract     CHF (congestive heart failure)     Chronic kidney disease     Chronic sciatica of left side     Right lower back pain due to sciatica    Coronary artery disease     Depression     Dry eyes     GERD (gastroesophageal reflux disease)     History of colon cancer     Hyperaldosteronism     Hyperlipidemia     Hypertension     Hypothyroidism     Keloid cicatrix     Left ventricular diastolic dysfunction with preserved systolic function     Lupus (systemic lupus erythematosus) 8/17/2012    Malnutrition 5/16/2017    Osteoarthritis of cervical spine 8/17/2012    Osteopenia     PAD (peripheral artery disease)     FRAN (renal artery stenosis)        Past Surgical History:   Procedure Laterality Date    BIOPSY-BONE MARROW Left 10/16/2017    Performed by Grant [facility]illan [facility] at [facility] OR 2ND FLR    CARDIAC CATHETERIZATION  07/27/2011    x1    CHOLECYSTECTOMY  1999    COLON SURGERY  2000 & 2003    partial removal    COLONOSCOPY N/A 4/14/2016    Procedure: COLONOSCOPY;  Surgeon: Jose Steen MD;  Location: [facility] ([address] [place] [address]);  Service: Endoscopy;  Laterality: N/A;  prep 2 days prior light meals only/clear liquid day before  and 4 dulcolax tabs (5 mgs) at noon    COLONOSCOPY N/A 9/14/2017    Procedure: COLONOSCOPY;  Surgeon: Jose Steen MD;  Location: [facility] ([address] [place] [address]);  Service: Endoscopy;  Laterality: N/A;    COLONOSCOPY N/A 9/14/2017    Performed by Jose Steen MD at [facility] (4TH FLR)    COLONOSCOPY N/A 4/14/2016    Performed by Jose Steen MD at [facility] (4TH FLR)    COLONOSCOPY N/A 4/23/2013    Performed by Jose Steen MD at [facility] ([address] [place] [address])    TALIA-TRANSFORAMINAL Left  11/3/2016    [facility]    ESOPHAGOGASTRODUODENOSCOPY (EGD) N/A 3/16/2017    [facility] ENDO    EYE SURGERY      HAND SURGERY Bilateral 1996 & 1997    due to carpal tunnel     HERNIA REPAIR      HYSTERECTOMY  1983    INJECTION-STEROID-EPIDURAL-TRANSFORAMINAL Left 1/7/2016    [facility]    Left heart cath Left 12/20/2017    [facility]    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    Clonidine Hallucinations     Hallucinations    Lactose     Arthrotec 50 [diclofenac-misoprostol]      Unknown    Bentyl [dicyclomine]      Not effective    Doxycycline Nausea Only    Lomotil [diphenoxylate-atropine] Nausea And Vomiting     Stomach cramps with vomitting    Lozol [indapamide] Rash    Norvasc [amlodipine]      Not tolerated    Tramadol Nausea Only       No current facility-administered medications on file prior to encounter.      Current Outpatient Medications on File Prior to Encounter   Medication Sig    aspirin (ECOTRIN) 81 MG EC tablet Take 1 tablet (81 mg total) by mouth once daily.    atorvastatin (LIPITOR) 80 MG tablet Take 80 mg by mouth.    butalbital-aspirin-caffeine -40 mg (FIORINAL) -40 mg Cap Take 1 capsule by mouth.    calcipotriene 0.005 % sclp soln Apply 1 application  topically 2 (two) times daily.    carvedilol (COREG) 25 MG tablet Take 1 tablet (25 mg total) by mouth 2 (two) times daily.    citalopram (CELEXA) 20 MG tablet TAKE 1 AND 1/2  TABLETS (30 MG TOTAL) ONCE DAILY.    clobetasol (TEMOVATE) 0.05 % external solution Apply topically 2 (two) times daily.    dicyclomine (BENTYL) 10 MG capsule Take 10 mg by mouth.    doxazosin (CARDURA) 1 MG tablet Take 1 tablet (1 mg total) by mouth every evening.    ergocalciferol (VITAMIN D2) 50,000 unit Cap Take 1 capsule (50,000 Units total) by mouth every 7 days.    famotidine (PEPCID) 20 MG tablet Take 1 tablet (20 mg total) by mouth once daily.    furosemide (LASIX) 10 mg/mL injection Inject 4 mLs (40 mg total) into the vein once daily.    furosemide (LASIX) 40 MG tablet Take 1 tablet (40 mg total) by mouth daily as needed (for weight gain, shortness of breath).    hydroxychloroquine (PLAQUENIL) 200 mg tablet TAKE 1 TABLET TWICE DAILY    isosorbide mononitrate (IMDUR) 60 MG 24 hr tablet Take 1 tablet (60 mg total) by mouth once daily.    latanoprost 0.005 % ophthalmic solution INSTILL 1 DROP INTO BOTH EYES EVERY DAY    levothyroxine (SYNTHROID) 75 MCG tablet Take 1 tablet (75 mcg total) by mouth before breakfast.    metOLazone (ZAROXOLYN) 2.5 MG tablet Take 1 tablet (2.5 mg total) by mouth once daily.    mometasone (ELOCON) 0.1 % ointment Apply topically once daily. To rashes for 14 days    NIFEdipine (PROCARDIA-XL) 60 MG (OSM) 24 hr tablet Take 1 tablet (60 mg total) by mouth 2 (two) times daily.    rosuvastatin (CRESTOR) 10 MG tablet Take 1 tablet (10 mg total) by mouth every evening.     Family History     Problem Relation (Age of Onset)    Diabetes Maternal Grandmother, Son, Maternal Aunt    Glaucoma Maternal Grandmother    Heart attack Mother    Heart disease Father    Hypertension Mother, Father, Sister, Brother    Kidney disease Brother    Kidney failure Brother    No Known Problems Daughter, Maternal  Grandfather, Paternal Grandmother, Paternal Grandfather        Tobacco Use    Smoking status: Former Smoker     Packs/day: 1.50     Years: 30.00     Pack years: 45.00     Types: Cigarettes     Start date:      Last attempt to quit: 3/13/1985     Years since quittin.5    Smokeless tobacco: Never Used   Substance and Sexual Activity    Alcohol use: No    Drug use: No    Sexual activity: Not Currently     Partners: Male     Birth control/protection: Abstinence     Review of Systems   Constitution: Positive for weight gain. Negative for chills and fever.   Cardiovascular: Positive for dyspnea on exertion. Negative for chest pain and leg swelling.   Respiratory: Negative for cough, hemoptysis and shortness of breath.    Skin: Negative for dry skin, flushing and rash.   Musculoskeletal: Negative for arthritis, joint pain and joint swelling.   Gastrointestinal: Negative for abdominal pain, diarrhea, nausea and vomiting.   Genitourinary: Negative for dysuria, frequency and urgency.   Neurological: Negative for dizziness, focal weakness, headaches and light-headedness.     Objective:     Vital Signs (Most Recent):  Temp: 98.2 °F (36.8 °C) (18 1137)  Pulse: (!) 57 (18 1157)  Resp: 20 (18 1137)  BP: 137/65 (18 1137)  SpO2: 97 % (18 1137) Vital Signs (24h Range):  Temp:  [98.2 °F (36.8 °C)-98.7 °F (37.1 °C)] 98.2 °F (36.8 °C)  Pulse:  [55-61] 57  Resp:  [18-20] 20  SpO2:  [93 %-97 %] 97 %  BP: (117-156)/(58-74) 137/65     Weight: 51.9 kg (114 lb 8 oz)  Body mass index is 19.65 kg/m².    SpO2: 97 %  O2 Device (Oxygen Therapy): room air    Intake/Output Summary (Last 24 hours) at 2018 1543  Last data filed at 2018 0953  Gross per 24 hour   Intake 320 ml   Output 975 ml   Net -655 ml     Lines/Drains/Airways     Peripheral Intravenous Line                 Peripheral IV - Single Lumen 18 2100 Left;Posterior Forearm 1 day              Physical Exam   Constitutional: She is  oriented to person, place, and time. No distress.   Pt looks fatigued, on room air    HENT:   Head: Normocephalic and atraumatic.   Eyes: Conjunctivae and EOM are normal. Pupils are equal, round, and reactive to light.   Neck: Normal range of motion. No JVD present.   Cardiovascular: Normal rate, regular rhythm and intact distal pulses. Exam reveals no friction rub.   No murmur heard.  Pulmonary/Chest: Effort normal. No respiratory distress. She has rales.   Abdominal: Soft. She exhibits no distension. There is no tenderness.   Musculoskeletal: Normal range of motion. She exhibits no edema or deformity.   Neurological: She is alert and oriented to person, place, and time. No cranial nerve deficit.   Skin: Skin is warm. No rash noted. No erythema.     Significant Labs:   CMP   Recent Labs   Lab  09/29/18   0434  09/30/18   0517   NA  135*  134*   K  3.5  3.3*   CL  101  100   CO2  22*  19*   GLU  90  87   BUN  118*  118*   CREATININE  5.4*  5.6*   CALCIUM  8.2*  8.2*   ALBUMIN  2.1*  2.1*   ANIONGAP  12  15   ESTGFRAFRICA  8.5*  8.1*   EGFRNONAA  7.3*  7.0*    and CBC   Recent Labs   Lab  09/30/18   0517   WBC  7.77   HGB  8.1*   HCT  25.2*   PLT  371*     Significant Imaging: Echocardiogram:   2D echo with color flow doppler:   Results for orders placed or performed during the hospital encounter of 09/28/18   2D echo with color flow doppler   Result Value Ref Range    EF 60 55 - 65    Mitral Valve Regurgitation MILD     Diastolic Dysfunction Yes (A)     Aortic Valve Regurgitation MILD     Aortic Valve Stenosis MILD (A)     Est. PA Systolic Pressure 42.19 (A)     Pericardial Effusion TRIVIAL     Tricuspid Valve Regurgitation MILD      Assessment and Plan:     * Acute on chronic diastolic congestive heart failure    71 yo female with HFpEF, CKD IV, HTN, HLD, hypothyroidism, SLE, moderate aortic stenosis who came in with worsening dyspnea chest xray and BNP c/w ADHF.    -Physical exam and lab findings c/w ADHF likely  due to interruption of Lasix vs fluid retention from prednisone use for SLE flair  -2D echo with normal EF and Grade 2 diastolic HF. No evidence of pericarditis/significant effusion.   -Not hypervolemic on physical exam though chest xray with evidence of congestion   -Worsening renal function, likely a component of cardiorenal syndrome exist but suspect MARYAM is driving symptoms. Agree with nephrology work up for MARYAM.   -Per pt dry weight 112 lb,current weight 114.  Agree with Diuresis.         VTE Risk Mitigation (From admission, onward)        Ordered     heparin (porcine) injection 5,000 Units  Every 8 hours      09/28/18 1921     IP VTE HIGH RISK PATIENT  Once      09/28/18 1921        Thank you for your consult. I will sign off. Please contact us if you have any additional questions.    Sanchez Mcdermott MD  Cardiology   [facility]

## 2018-09-30 NOTE — PLAN OF CARE
Problem: Physical Therapy Goal  Goal: Physical Therapy Goal  Goals to be met by: 10 days (10/10)    Patient will increase functional independence with mobility by performin. Supine to sit with Set-up [place]  2. Sit to supine with Set-up [place]  3. Sit to stand transfer with Supervision  4. Gait  x 200 feet with Supervision using no assistive device  5. Lower extremity exercise program x30 reps per handout, with independence to improve muscular strength and endurance.   6. Pt will ambulate 6 steps with (B) HR and SBA.       Outcome: Ongoing (interventions implemented as appropriate)  PT evaluation completed. POC initiated.    Janet Pardo, PT, DPT  2018

## 2018-09-30 NOTE — SUBJECTIVE & OBJECTIVE
Past Medical History:   Diagnosis Date    Acute hypoxemic respiratory failure 4/28/2018    Acute on chronic diastolic congestive heart failure 11/17/2017    Allergy     Anatomical narrow angle glaucoma     Anemia     States diagnosed about a month ago    Aortic atherosclerosis     Arthritis     Cataract     CHF (congestive heart failure)     Chronic kidney disease     Chronic sciatica of left side     Right lower back pain due to sciatica    Coronary artery disease     Depression     Dry eyes     GERD (gastroesophageal reflux disease)     History of colon cancer     Hyperaldosteronism     Hyperlipidemia     Hypertension     Hypothyroidism     Keloid cicatrix     Left ventricular diastolic dysfunction with preserved systolic function     Lupus (systemic lupus erythematosus) 8/17/2012    Malnutrition 5/16/2017    Osteoarthritis of cervical spine 8/17/2012    Osteopenia     PAD (peripheral artery disease)     FRAN (renal artery stenosis)        Past Surgical History:   Procedure Laterality Date    BIOPSY-BONE MARROW Left 10/16/2017    [facility] 2ND FLR    CARDIAC CATHETERIZATION  07/27/2011    x1    CHOLECYSTECTOMY  1999    COLON SURGERY  2000 & 2003    partial removal    COLONOSCOPY N/A 4/14/2016    Procedure: COLONOSCOPY;  Surgeon: Jose Steen MD;  Location: [facility] ([address] [place] [address]);  Service: Endoscopy;  Laterality: N/A;  prep 2 days prior light meals only/clear liquid day before  and 4 dulcolax tabs (5 mgs) at noon    COLONOSCOPY N/A 9/14/2017    Procedure: COLONOSCOPY;  Surgeon: Jose Steen MD;  Location: [facility] ([address] [place] [address]);  Service: Endoscopy;  Laterality: N/A;    COLONOSCOPY N/A 9/14/2017    Performed by Jose Steen MD at [facility] (4TH FLR)    COLONOSCOPY N/A 4/14/2016    Performed by Jose Steen MD at [facility] (4TH FLR)    COLONOSCOPY N/A 4/23/2013    Performed by Jose Steen MD at [facility] (4TH FLR)    TALIA-TRANSFORAMINAL  Left 11/3/2016    [facility] PAIN T    ESOPHAGOGASTRODUODENOSCOPY (EGD) N/A 3/16/2017    [facility] ENDO    EYE SURGERY      HAND SURGERY Bilateral 1996 & 1997    due to carpal tunnel     HERNIA REPAIR      HYSTERECTOMY  1983    INJECTION-STEROID-EPIDURAL-TRANSFORAMINAL Left 1/7/2016    [facility]    Left heart cath Left 12/20/2017    [facility] CATH LAB    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    Clonidine Hallucinations     Hallucinations    Lactose     Arthrotec 50 [diclofenac-misoprostol]      Unknown    Bentyl [dicyclomine]      Not effective    Doxycycline Nausea Only    Lomotil [diphenoxylate-atropine] Nausea And Vomiting     Stomach cramps with vomitting    Lozol [indapamide] Rash    Norvasc [amlodipine]      Not tolerated    Tramadol Nausea Only       No current facility-administered medications on file prior to encounter.      Current Outpatient Medications on File Prior to Encounter   Medication Sig    aspirin (ECOTRIN) 81 MG EC tablet Take 1 tablet (81 mg total) by mouth once daily.    atorvastatin (LIPITOR) 80 MG tablet Take 80 mg by mouth.    butalbital-aspirin-caffeine -40 mg (FIORINAL) -40 mg Cap Take 1 capsule by mouth.    calcipotriene 0.005 % sclp soln Apply 1 application  topically 2 (two) times daily.    carvedilol (COREG) 25 MG tablet Take 1 tablet (25 mg total) by mouth 2 (two) times daily.    citalopram (CELEXA) 20 MG tablet TAKE 1 AND 1/2  TABLETS (30 MG TOTAL) ONCE DAILY.    clobetasol (TEMOVATE) 0.05 % external solution Apply topically 2 (two) times daily.    dicyclomine (BENTYL) 10 MG capsule Take 10 mg by mouth.    doxazosin (CARDURA) 1 MG tablet Take 1 tablet (1 mg total) by mouth every evening.    ergocalciferol (VITAMIN D2) 50,000 unit Cap Take 1 capsule (50,000 Units total) by mouth every 7 days.    famotidine (PEPCID) 20 MG tablet Take 1 tablet (20 mg total) by mouth once daily.    furosemide (LASIX) 10 mg/mL injection Inject 4 mLs (40 mg total) into the vein once daily.    furosemide (LASIX) 40 MG tablet Take 1 tablet (40 mg total) by mouth daily as needed (for weight gain, shortness of breath).    hydroxychloroquine (PLAQUENIL) 200 mg tablet TAKE 1 TABLET TWICE DAILY    isosorbide mononitrate (IMDUR) 60 MG 24 hr tablet Take 1 tablet (60 mg total) by mouth once daily.    latanoprost 0.005 % ophthalmic solution INSTILL 1 DROP INTO BOTH EYES EVERY DAY    levothyroxine (SYNTHROID) 75 MCG tablet Take 1 tablet (75 mcg total) by mouth before breakfast.    metOLazone (ZAROXOLYN) 2.5 MG tablet Take 1 tablet (2.5 mg total) by mouth once daily.    mometasone (ELOCON) 0.1 % ointment Apply topically once daily. To rashes for 14 days    NIFEdipine (PROCARDIA-XL) 60 MG (OSM) 24 hr tablet Take 1 tablet (60 mg total) by mouth 2 (two) times daily.    rosuvastatin (CRESTOR) 10 MG tablet Take 1 tablet (10 mg total) by mouth every evening.     Family History     Problem Relation (Age of Onset)    Diabetes Maternal Grandmother, Son, Maternal Aunt    Glaucoma Maternal Grandmother    Heart attack Mother    Heart disease Father    Hypertension Mother, Father, Sister, Brother    Kidney disease Brother    Kidney failure Brother    No Known Problems Daughter, Maternal  Grandfather, Paternal Grandmother, Paternal Grandfather        Tobacco Use    Smoking status: Former Smoker     Packs/day: 1.50     Years: 30.00     Pack years: 45.00     Types: Cigarettes     Start date:      Last attempt to quit: 3/13/1985     Years since quittin.5    Smokeless tobacco: Never Used   Substance and Sexual Activity    Alcohol use: No    Drug use: No    Sexual activity: Not Currently     Partners: Male     Birth control/protection: Abstinence     Review of Systems   Constitution: Positive for weight gain. Negative for chills and fever.   Cardiovascular: Positive for dyspnea on exertion. Negative for chest pain and leg swelling.   Respiratory: Negative for cough, hemoptysis and shortness of breath.    Skin: Negative for dry skin, flushing and rash.   Musculoskeletal: Negative for arthritis, joint pain and joint swelling.   Gastrointestinal: Negative for abdominal pain, diarrhea, nausea and vomiting.   Genitourinary: Negative for dysuria, frequency and urgency.   Neurological: Negative for dizziness, focal weakness, headaches and light-headedness.     Objective:     Vital Signs (Most Recent):  Temp: 98.2 °F (36.8 °C) (18 1137)  Pulse: (!) 57 (18 1157)  Resp: 20 (18 1137)  BP: 137/65 (18 1137)  SpO2: 97 % (18 1137) Vital Signs (24h Range):  Temp:  [98.2 °F (36.8 °C)-98.7 °F (37.1 °C)] 98.2 °F (36.8 °C)  Pulse:  [55-61] 57  Resp:  [18-20] 20  SpO2:  [93 %-97 %] 97 %  BP: (117-156)/(58-74) 137/65     Weight: 51.9 kg (114 lb 8 oz)  Body mass index is 19.65 kg/m².    SpO2: 97 %  O2 Device (Oxygen Therapy): room air      Intake/Output Summary (Last 24 hours) at 2018 1543  Last data filed at 2018 0953  Gross per 24 hour   Intake 320 ml   Output 975 ml   Net -655 ml       Lines/Drains/Airways     Peripheral Intravenous Line                 Peripheral IV - Single Lumen 18 2100 Left;Posterior Forearm 1 day                Physical Exam   Constitutional:  She is oriented to person, place, and time. No distress.   Pt looks fatigued, on room air    HENT:   Head: Normocephalic and atraumatic.   Eyes: Conjunctivae and EOM are normal. Pupils are equal, round, and reactive to light.   Neck: Normal range of motion. No JVD present.   Cardiovascular: Normal rate, regular rhythm and intact distal pulses. Exam reveals no friction rub.   No murmur heard.  Pulmonary/Chest: Effort normal. No respiratory distress. She has rales.   Abdominal: Soft. She exhibits no distension. There is no tenderness.   Musculoskeletal: Normal range of motion. She exhibits no edema or deformity.   Neurological: She is alert and oriented to person, place, and time. No cranial nerve deficit.   Skin: Skin is warm. No rash noted. No erythema.       Significant Labs:   CMP   Recent Labs   Lab  09/29/18   0434  09/30/18   0517   NA  135*  134*   K  3.5  3.3*   CL  101  100   CO2  22*  19*   GLU  90  87   BUN  118*  118*   CREATININE  5.4*  5.6*   CALCIUM  8.2*  8.2*   ALBUMIN  2.1*  2.1*   ANIONGAP  12  15   ESTGFRAFRICA  8.5*  8.1*   EGFRNONAA  7.3*  7.0*    and CBC   Recent Labs   Lab  09/30/18   0517   WBC  7.77   HGB  8.1*   HCT  25.2*   PLT  371*       Significant Imaging: Echocardiogram:   2D echo with color flow doppler:   Results for orders placed or performed during the hospital encounter of 09/28/18   2D echo with color flow doppler   Result Value Ref Range    EF 60 55 - 65    Mitral Valve Regurgitation MILD     Diastolic Dysfunction Yes (A)     Aortic Valve Regurgitation MILD     Aortic Valve Stenosis MILD (A)     Est. PA Systolic Pressure 42.19 (A)     Pericardial Effusion TRIVIAL     Tricuspid Valve Regurgitation MILD

## 2018-09-30 NOTE — PROGRESS NOTES
"     Progress Note  Hospital Medicine     Patient Name: Ewelina Arnold  MRN:  [id number]  Hospital Medicine Team: [facility]  Date of Admission:  9/28/2018     Principal Problem:  Acute on chronic diastolic congestive heart failure   Primary Care Physician: Amarilys Johns MD      History of Present Illness:     Ms. Ewelina Arnold is a 72 y.o. female with PMH of HFpEF with EF 55-60%, CKD stage IV with solitary kidney after donating a kidney, HLD/ HTN, Hypothyroidism, SLE, and moderate aortic stenosis with PABLITO = 1.12 cm2, admitted to hospital medicine as a direct admit from cardiology clinic (Dr Isabel) for ADHF and fluid retention. Patient was asked to stop her lasix 40mg BID approx 1 week ago due to worsening renal function. Since then, pt has experienced abdominal distension, SOB/ PEDRO while talking to the bathroom, as well as decreased urine output. Associated with mild cough, with no hemoptysis or fevers. In addition, patient underwent a treatment course with prednisone for SLE flair approx 3 weeks ago. No LE edema, CP, palpitations, lightheadedness, n/v.     In cardiology clinic, pt was found to have fluid overload with inspiratory crackles at lung bases and + JVD. Labs notable for Cr of 5.1 from 3.8-3.4 baseline, BNP of 3718 from 3946-1636 prior, and pulm edema on CXR. Weight is 114 which he stated is her "dry" weight. However, abd distension and SOB with minimal exertion prompted pt to be evaluated by her PCP and then Cardiology. Cardiology does not believe that aortic valve replacement for her mild stenosis would benefit the pt    Interval History  Net neg 680 mL yesterday on Lasix gtt. Still reports dyspnea with exertion.     Review of Systems   Constitutional: Negative for chill, fever. + fatigue  HENT: Negative for sore throat, trouble swallowing.    Eyes: Negative for photophobia, visual disturbance.   Respiratory: as per HPI  Cardiovascular: as per HPI  Gastrointestinal: Negative for abdominal pain, " constipation, diarrhea, nausea, vomiting.   Endocrine: Negative for cold intolerance, heat intolerance.   Musculoskeletal: Negative for arthralgias, myalgias.   Skin: Negative for rash, wound, erythema   Neurological: Negative for dizziness, syncope, weakness, light-headedness.   Psychiatric/Behavioral: Negative for confusion, hallucinations, anxiety  All other systems reviewed and are negative.    Physical Exam:     Vital Signs (Most Recent):  Temp: 97 °F (36.1 °C) (09/28/18 2022)  Pulse: (!) 59 (09/28/18 2022)  Resp: 18 (09/28/18 2022)  BP: (!) 143/64 (09/28/18 2022)  SpO2: (!) 94 % (09/28/18 2022) Vital Signs Range (Last 24H):  Temp:  [97 °F (36.1 °C)-97.3 °F (36.3 °C)]   Pulse:  [55-62]   Resp:  [18-20]   BP: (120-145)/(48-65)   SpO2:  [93 %-99 %]    Body mass index is 19.64 kg/m².     Physical Exam:  Constitutional: Well-developed, well-nourished, non-distressed, not diaphoretic.   HENT: NC/AT, external ears normal, oropharynx clear, MMM w/o exudates.   Eyes: PERRL, EOMI, conjunctiva normal, no discharge b/l, no scleral icterus   Neck: normal ROM, supple,  CV: RRR, no m/r/g, no carotid bruits, +2 peripheral pulses.  Pulmonary/Chest wall: Breathing comfortably w/o distress, no rales appreciated  GI: Soft, non-tender, (+) BS  Musculoskeletal: Normal ROM, no edema, no atrophy, no tenderness throughout   Neurological: AAO x 4, CN II-XI in tact, nl sensation, nl strength/tone   Skin: warm, dry, (-) erythema, (-) rash, (-)pallor, (-) acanthosis.   Psych: normal mood and affect, normal behavior, thought content and judgement.  Vitals reviewed.    Labs/Radiology:  I have reviewed pertinent labs/imaging in the past 24 hours.     Diagnostic Results:  Echo 8/21/2018  CONCLUSIONS     1 - Normal left ventricular systolic function (EF 60-65%).     2 - Concentric hypertrophy.     3 - Impaired LV relaxation, elevated LAP (grade 2 diastolic dysfunction).     4 - Normal right ventricular systolic function .     5 - The estimated  PA systolic pressure is 38 mmHg.     6 - Mild to moderate aortic regurgitation.     7 - Moderate mitral regurgitation.     8 - Mild to moderate tricuspid regurgitation.     9 - Trivial pericardial effusion.     10 - Severe left atrial enlargement.     11 - No wall motion abnormalities.     12 - Moderate aortic stenosis, PABLITO = 1.12 cm2, AVAi = 0.73 cm2/m2, peak velocity = 4.08 m/s, mean gradient = 33 mmHg. DI = 0.29, increased gradients appear to be related to concomitant Aortic regurgitation. Visually the valve is moderately stenotic as   well.       Assessment and Plan:     Ms. Ewelina Arnold is a 72 y.o. female with PMH of HFpEF with EF 55-60%, CKD stage IV with solitary kidney after donating a kidney, HLD/ HTN, Hypothyroidism, SLE, and moderate aortic stenosis with PABLITO = 1.12 cm2, admitted to hospital medicine as a direct admit from cardiology clinic (Dr Isabel) for ADHF, MARYAM on CKD st 5 and fluid retention    Active Hospital Problems    Diagnosis  POA    *Acute on chronic diastolic congestive heart failure [I50.33]  Yes     Priority: 1 - High     Grade 2 diastolic failure, systolic HF, aortic stenosis      Pulmonary edema cardiac cause [I50.1]  Yes     Priority: 1 - High     Mild-mod pleural effusion on XR today      Metabolic acidosis, normal anion gap (NAG) [E87.2]  Yes     Priority: 2     MARYAM (acute kidney injury) [N17.9]  Yes     Priority: 2     Solitary kidney, acquired [Z90.5]  Not Applicable     Priority: 2     CKD (chronic kidney disease), stage IV [N18.4]  Yes     Priority: 2      Donated kidney to her brother  1/23/2017 patient continues to follow-up with nephrology awaiting renal panel will contact patient results available.  We'll continue to monitor      Nonrheumatic aortic valve stenosis [I35.0]  Yes     Priority: 3      Aortic regurg and stenosis on recent echo, not eligible for TAVR (will not improve aortic valve function)      Anemia in stage 4 chronic kidney disease [N18.4, D63.1]  Yes     " Priority: 4     Renovascular hypertension [I15.0]  Yes     Priority: 5     Hypovitaminosis D [E55.9]  Yes    GERD (gastroesophageal reflux disease) [K21.9]  Yes    Acquired hypothyroidism [E03.9]  Yes    Coronary artery disease due to calcified coronary lesion [I25.10, I25.84]  Yes    Other forms of systemic lupus erythematosus [M32.8]  Yes     Chronic      Resolved Hospital Problems   No resolved problems to display.       Acute on chronic diastolic congestive heart failure  Pulmonary edema cardiac cause  - BNP of 3718 from 9066-4195 prior, and pulm edema on CXR. Weight is 114 which he stated is her "dry" weight.  - volume retention is likely a component of worsening CKD and / or cardiorenal and/or recent prednisone use and /or recent lasix cessation   - received lasix 100mg IV push on admission, on lasix gtt at 10 mg/hr  - resume home metolazone  - strict I/O  - daily weights  - cont HF/ HTN meds  - repeat 2D echo  - cardiology consulted  - nephrology consulted    MARYAM (acute kidney injury) on CKD (chronic kidney disease), stage IV  Solitary kidney, acquired  Metabolic acidosis, normal anion gap (NAG)   - labs notable for Cr of 5.6 from 3-3.8 baseline  - lasix diuresis as above  - started on sodium bicarb 650 TID given bicarb of 19-20  - follow renal function panel   - nephrology consulted    Nonrheumatic aortic valve stenosis  - 8/2018 echo with Moderate aortic stenosis, PABLITO = 1.12 cm2, AVAi = 0.73 cm2/m2, peak velocity = 4.08 m/s, mean gradient = 33 mmHg. DI = 0.29, increased gradients appear to be related to concomitant Aortic regurgitation  - cardiology does not believe pt would benefit from AVR  - as above    Anemia in stage 4-5 chronic kidney disease  - hbg of 9, baseline  - anemia studies indicative of chronic disease, 2/2 renal failure    Renovascular hypertension   - decreased coreg 25mg BID to coreg 6.25 bid given HR of 50s  - cont home doxazosin   - cont home isosorbide mononitrate 60mh  - cont " home nifedipine 60mg BID    Hypovitaminosis D  - vit D low - ergocalciferol supplementation    GERD   - cont home famotidine    Acquired hypothyroidism  - cont home synthroid    Coronary artery disease due to calcified coronary lesion  - cont home ASA 81 and statin   - BB as above    Other forms of systemic lupus erythematosus  - cont home hydroxychloroquine 200mg bid    Diet:  Low Na, 1200 cc fluid restriction   DVT PPx:  heparin    Disposition:    Diuresing. Cardiology and nephro consults      Kaye Urias MD  [facility] Medicine Staff  [facility]

## 2018-09-30 NOTE — PT/OT/SLP EVAL
"Physical Therapy Evaluation    Patient Name:  Ewelina Arnold   MRN:  [id number]    Recommendations:     Discharge Recommendations:  home health PT   Discharge Equipment Recommendations: none   Barriers to discharge: None    Assessment:     Ewelina Arnold is a 72 y.o. female admitted with a medical diagnosis of Acute on chronic diastolic congestive heart failure.  She presents with the following impairments/functional limitations:  impaired endurance, impaired balance, weakness, gait instability, decreased safety awareness, impaired functional mobilty, impaired cardiopulmonary response to activity.    During today's session, pt limited due to incr SOB with minimal activity. Ambulatory in hallway but not at baseline in terms of quality and normal distance able to perform. Expects with continued medical intervention (pt here for fluid overload) pt will continue to improve with SOB symptoms thus having mobility improve as well. Appropriate for discharge home c/ HH PT to assist with addressing endurance due to pt living alone in home environment.    Rehab Prognosis:  Good; patient would benefit from acute skilled PT services to address these deficits and reach maximum level of function.      Recent Surgery: * No surgery found *      Plan:     During this hospitalization, patient to be seen 3 x/week to address the above listed problems via gait training, therapeutic activities, therapeutic exercises  · Plan of Care Expires:  10/30/18   Plan of Care Reviewed with: patient    Subjective     Communicated with nsg prior to session.  Patient found supine in bed upon PT entry to room, agreeable to evaluation.      Chief Complaint: SOB  Patient comments/goals: "I just hope they find out what is wrong" per pt during session.   Pain/Comfort:  · Pain Rating 1: 0/10    Patients cultural, spiritual, Hindu conflicts given the current situation:      Living Environment:  Patient History:  · Home environment: lives alone in one story " home c/ 7 [address]  · Assistance provided:  dgt and son  · Bathroom set up:  Tub/shower    Previous Level of Mobilty  · Transfers: (I)  · Gait: (I) for HH and CD    Additional Roles and Hx of Patient  · Working: retired  · Driving: no  · Hobbies:exercise class  · Hearing or Vision Deficit: none  · Hx of Falls: none    DME: RW, SBQC  - Bold implies equipment being used        Objective:     Patient found with: peripheral IV, telemetry     General Precautions: Standard, fall   Orthopedic Precautions:N/A   Braces: N/A         Exams:  Cognitive Exam  Patient is oriented to Person, Place, Time and Situation and follows 100% of multi-step commands    Fine Motor Coordination -       Intact   Postural Exam Patient presented with the following abnormalities:    -       No postural abnormalities identified   Sensation    -       Intact   Skin Integrity/Edema     -       Skin integrity: Visible skin intact   R LE ROM WFL   R LE Strength  WFL   L LE ROM WFL   L LE Strength  WFL       Functional Mobility  Bed Mobility  Supine to Sit: contact guard assistance   Sit to Supine: contact guard assistance   Transfers Sit to Stand:  stand by assistance with no AD for 2 trials     Gait Gait Distance: 80 ft, 20 ft no AD  Assistance Level: contact guard assistance  Description: slowed pooja, ambulated with narrow base of support and req multiple standing rest break. Limited distance due to incr SOB         Balance   Static Sitting stand by assistance   Dynamic Sitting stand by assistance   Static Standing contact guard assistance   Dynamic Standing contact guard assistance         AM-PAC 6 CLICK MOBILITY  Total Score:18       Therapeutic Activities and Exercises:    PT educated pt on the following  - role of PT  - PT POC (including frequency and duration while in hospital)  - discharge recommendation (HH PT) and equipment needs (none)  - level of assistance currently req (1 person Supervision)and safety precautions with [facility] staff   All  questions and concerns answered and addressed. White board updated with pertinent information. Nsg notified.       Patient left in bathroom with all lines intact and call button in reach.    GOALS:   Multidisciplinary Problems     Physical Therapy Goals        Problem: Physical Therapy Goal    Goal Priority Disciplines Outcome Goal Variances Interventions   Physical Therapy Goal     PT, PT/OT Ongoing (interventions implemented as appropriate)     Description:  Goals to be met by: 10 days (10/10)    Patient will increase functional independence with mobility by performin. Supine to sit with Set-up [employer]  2. Sit to supine with Set-up [employer] [facility]  3. Sit to stand transfer with Supervision  4. Gait  x 200 feet with Supervision using no assistive device  5. Lower extremity exercise program x30 reps per handout, with independence to improve muscular strength and endurance.   6. Pt will ambulate 6 steps with (B) HR and SBA.                         History:     Past Medical History:   Diagnosis Date    Acute hypoxemic respiratory failure 2018    Acute on chronic diastolic congestive heart failure 2017    Allergy     Anatomical narrow angle glaucoma     Anemia     States diagnosed about a month ago    Aortic atherosclerosis     Arthritis     Cataract     CHF (congestive heart failure)     Chronic kidney disease     Chronic sciatica of left side     Right lower back pain due to sciatica    Coronary artery disease     Depression     Dry eyes     GERD (gastroesophageal reflux disease)     History of colon cancer     Hyperaldosteronism     Hyperlipidemia     Hypertension     Hypothyroidism     Keloid cicatrix     Left ventricular diastolic dysfunction with preserved systolic function     Lupus (systemic lupus erythematosus) 2012    Malnutrition 2017    Osteoarthritis of cervical spine 2012    Osteopenia     PAD (peripheral artery disease)     FRAN (renal artery  stenosis)        Past Surgical History:   Procedure Laterality Date    BIOPSY-BONE MARROW Left 10/16/2017    Performed by Grant Santillan MD at [facility] OR 2ND FLR    CARDIAC CATHETERIZATION  07/27/2011    x1    CHOLECYSTECTOMY  1999    COLON SURGERY  2000 & 2003    partial removal    COLONOSCOPY N/A 4/14/2016    Procedure: COLONOSCOPY;  Surgeon: Jose Steen MD;  Location: [facility] (4TH FLR);  Service: Endoscopy;  Laterality: N/A;  prep 2 days prior light meals only/clear liquid day before  and 4 dulcolax tabs (5 mgs) at noon    COLONOSCOPY N/A 9/14/2017    Procedure: COLONOSCOPY;  Surgeon: Jose Steen MD;  Location: [facility] (4TH FLR);  Service: Endoscopy;  Laterality: N/A;    COLONOSCOPY N/A 9/14/2017    Performed by Jose Steen MD at [facility] ENDO (4TH FLR)    COLONOSCOPY N/A 4/14/2016    Performed by Jose Steen MD at [facility] (4TH FLR)    COLONOSCOPY N/A 4/23/2013    Performed by Jose Steen MD at [facility] (4TH FLR)    TALIA-TRANSFORAMINAL Left 11/3/2016    [facility]    ESOPHAGOGASTRODUODENOSCOPY (EGD) N/A 3/16/2017    Performed by Yogesh Fernandes [facility] at [facility] ENDO    EYE SURGERY      HAND SURGERY Bilateral 1996 & 1997    due to carpal tunnel     HERNIA REPAIR      HYSTERECTOMY  1983    INJECTION-STEROID-EPIDURAL-TRANSFORAMINAL Left 1/7/2016    Per[facility] at [facility] PAIN T    Left heart cath Left 12/20/2017    Performed by Chandan Ly MD at [facility] CATH LAB    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    OOPHORECTOMY      removed one    Peripheral Iridotomy both eyes  1994    laser angle correction    THYROIDECTOMY, PARTIAL  2006    to remove two nodules and one-half thyroid       Clinical Decision Making:     History  Co-morbidities and personal factors that may impact the plan of care Examination  Body Structures and Functions, activity limitations and participation restrictions that may impact the  plan of care Clinical Presentation   Decision Making/ Complexity Score   Co-morbidities:   [] Time since onset of injury / illness / exacerbation  [x] Status of current condition  []Patient's cognitive status and safety concerns    [] Multiple Medical Problems (see med hx)  Personal Factors:   [] Patient's age  [x] Prior Level of function   [] Patient's home situation (environment and family support)  [] Patient's level of motivation  [] Expected progression of patient      HISTORY:(criteria)    [] 73014 - no personal factors/history    [x] 91357 - has 1-2 personal factor/comorbidity     [] 74475 - has >3 personal factor/comorbidity     Body Regions:  [] Objective examination findings  [] Head     []  Neck  [] Trunk   [] Upper Extremity  [x] Lower Extremity    Body Systems:  [] For communication ability, affect, cognition, language, and learning style: the assessment of the ability to make needs known, consciousness, orientation (person, place, and time), expected emotional /behavioral responses, and learning preferences (eg, learning barriers, education  needs)  [x] For the neuromuscular system: a general assessment of gross coordinated movement (eg, balance, gait, locomotion, transfers, and transitions) and motor function  (motor control and motor learning)  [] For the musculoskeletal system: the assessment of gross symmetry, gross range of motion, gross strength, height, and weight  [] For the integumentary system: the assessment of pliability(texture), presence of scar formation, skin color, and skin integrity  [] For cardiovascular/pulmonary system: the assessment of heart rate, respiratory rate, blood pressure, and edema     Activity limitations:    [] Patient's cognitive status and saf ety concerns          [x] Status of current condition      [] Weight bearing restriction  [] Cardiopulmunary Restriction    Participation Restrictions:   [] Goals and goal agreement with the patient     [] Rehab potential  (prognosis) and probable outcome      Examination of Body System: (criteria)    [x] 27608 - addressing 1-2 elements    [] 20097 - addressing a total of 3 or more elements     [] 46493 -  Addressing a total of 4 or more elements         Clinical Presentation: (criteria)  Stable - 82677     On examination of body system using standardized tests and measures patient presents with 1-2 elements from any of the following: body structures and functions, activity limitations, and/or participation restrictions.  Leading to a clinical presentation that is considered stable and/or uncomplicated                              Clinical Decision Making  (Eval Complexity):  Low- 86597     Time Tracking:     PT Received On: 09/30/18  PT Start Time: 0845     PT Stop Time: 0900  PT Total Time (min): 15 min     Billable Minutes: Evaluation 145      Janet Pardo, PT  09/30/2018

## 2018-09-30 NOTE — HPI
"Ms. Ewelina Arnold is a 72 y.o. 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 Patient was asked to stop her lasix 40mg BID approx 1 week ago due to worsening renal function. Since then, pt has experienced abdominal distension, SOB/ PEDRO while talking to the bathroom, as well as decreased urine output. Associated with mild cough, with no hemoptysis or fevers. In addition, patient underwent a treatment course with prednisone for SLE flair approx 3 weeks ago. No LE edema, CP, palpitations, lightheadedness, n/v.      In cardiology clinic, pt was found to have fluid overload with inspiratory crackles at lung bases and + JVD. Labs notable for Cr of 5.1 from 3.8-3.4 baseline, BNP of 3718 from 7346-3862 prior, and pulm edema on CXR. Weight is 114 which he stated is her "dry" weight. However, abd distension and SOB with minimal exertion prompted pt to be evaluated by her PCP and then Cardiology. Cardiology does not believe that aortic valve replacement for her mild stenosis would benefit the pt    Pt is currently on Lasix gtt (10 mg/hr) and metolazone 2.5 mg. Net negative 1.4 lt since admission. Pt feels better, sob breath improving.   "

## 2018-09-30 NOTE — CONSULTS
Consult acknowledged. Please see H & P.     Sanchez Mcdermott MD  [facility]

## 2018-09-30 NOTE — PROGRESS NOTES
[facility]  Nephrology  Progress Note    Patient Name: Ewelina Arnold  MRN: [id number]  Admission Date: 9/28/2018  Hospital Length of Stay: 2 days  Attending Provider: Kaye Urias MD   Primary Care Physician: Amarilys Johns MD  Principal Problem:Acute on chronic diastolic congestive heart failure    Subjective:     HPI: Ms. Ewelina Arnold is a 72 y.o. 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  Patient has had a slowly progressive decline in renal function over the last year . Serum crt 1.6 3/2018 and earlier this month 3.0-3.5 and today 5.4.  Patient presents with worsening SOB and abdominal distention of about a weeks duration.    No lE edema, noN&V, No fever, cough or sputum production.  No dysuria, hematuria, use of NSAIDs  Patient has been bolused and palced on lasix gtt, with about 600 cc UO at this time       Interval History:   Comfortable on Ra  But still sensation of SOB   uo 800 cc in last 24 hrs, -680cc, on lasix drip at 5 cc/hr  Serum crt up to 5.6 from 5.1 yesterday    Review of patient's allergies indicates:   Allergen Reactions    Ace inhibitors Swelling     Lips Swelling    Hydralazine analogues Other (See Comments)     Lupus exacerbation    Vioxx [rofecoxib] Swelling      lips swelling    Bactrim [sulfamethoxazole-trimethoprim]      Pt would like this medication to be added due to elevation of creatinine with single kidney.    Clonidine Hallucinations     Hallucinations    Lactose     Arthrotec 50 [diclofenac-misoprostol]      Unknown    Bentyl [dicyclomine]      Not effective    Doxycycline Nausea Only    Lomotil [diphenoxylate-atropine]  Nausea And Vomiting     Stomach cramps with vomitting    Lozol [indapamide] Rash    Norvasc [amlodipine]      Not tolerated    Tramadol Nausea Only     Current Facility-Administered Medications   Medication Frequency    acetaminophen tablet 650 mg Q4H PRN    aspirin EC tablet 81 mg Daily    atorvastatin tablet 80 mg Daily    carvedilol tablet 6.25 mg BID    doxazosin tablet 1 mg QHS    ergocalciferol capsule 50,000 Units Q7 Days    famotidine tablet 20 mg Daily    furosemide (LASIX) 2 mg/mL in sodium chloride 0.9% 100 mL infusion (conc: 2 mg/mL) Continuous    heparin (porcine) injection 5,000 Units Q8H    hydroxychloroquine tablet 200 mg BID    isosorbide mononitrate 24 hr tablet 60 mg Daily    latanoprost 0.005 % ophthalmic solution 1 drop Daily    levothyroxine tablet 75 mcg Before breakfast    magnesium oxide tablet 400 mg BID    metOLazone tablet 2.5 mg Daily    NIFEdipine 24 hr tablet 60 mg BID    ondansetron disintegrating tablet 4 mg Q8H PRN    ramelteon tablet 8 mg Nightly PRN    senna-docusate 8.6-50 mg per tablet 1 tablet BID PRN    sodium bicarbonate tablet 650 mg TID    sodium chloride 0.9% flush 3 mL Q8H       Objective:     Vital Signs (Most Recent):  Temp: 98.2 °F (36.8 °C) (09/30/18 1137)  Pulse: (!) 57 (09/30/18 1157)  Resp: 20 (09/30/18 1137)  BP: 137/65 (09/30/18 1137)  SpO2: 97 % (09/30/18 1137)  O2 Device (Oxygen Therapy): room air (09/30/18 1137) Vital Signs (24h Range):  Temp:  [98.2 °F (36.8 °C)-98.7 °F (37.1 °C)] 98.2 °F (36.8 °C)  Pulse:  [55-61] 57  Resp:  [18-20] 20  SpO2:  [93 %-97 %] 97 %  BP: (117-156)/(58-74) 137/65     Weight: 51.9 kg (114 lb 8 oz) (09/30/18 0900)  Body mass index is 19.65 kg/m².  Body surface area is 1.53 meters squared.    I/O last 3 completed shifts:  In: 240 [P.O.:240]  Out: 1400 [Urine:1400]    Physical Exam   Constitutional: She is oriented to person, place, and time. She appears well-developed and well-nourished. No distress.   NAD at  rest   HENT:   Head: Normocephalic and atraumatic.   Mouth/Throat: No oropharyngeal exudate.   Eyes: Conjunctivae and EOM are normal. Pupils are equal, round, and reactive to light. Right eye exhibits no discharge. Left eye exhibits no discharge. No scleral icterus.   Neck: Normal range of motion. Neck supple. No JVD present. No tracheal deviation present. No thyromegaly present.   Cardiovascular: Normal rate, regular rhythm and intact distal pulses. Exam reveals no gallop.   No murmur heard.  2/6 jennifer   no peripheral edema   Pulmonary/Chest: Effort normal and breath sounds normal. No stridor. No respiratory distress. She has no wheezes. She has no rales. She exhibits no tenderness.   diminished BS at  Bases bilaterally no crackles   Abdominal: Soft. Bowel sounds are normal. She exhibits no distension and no mass. There is no tenderness. There is no rebound and no guarding. No hernia.   Musculoskeletal: She exhibits no edema or tenderness.   Lymphadenopathy:     She has no cervical adenopathy.   Neurological: She is alert and oriented to person, place, and time. She exhibits normal muscle tone.   Skin: Skin is warm and dry. No rash noted. She is not diaphoretic. No erythema.   Psychiatric: She has a normal mood and affect. Judgment and thought content normal.   Nursing note and vitals reviewed.      Significant Labs:  All labs within the past 24 hours have been reviewed.     Significant Imaging:  Labs: Reviewed  X-Ray: Reviewed    Assessment/Plan:     MARYAM (acute kidney injury)    Ms. Ewelina Arnold is a 72 y.o. 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    MARYAM superimposed on ckd 4 vs progression of CKD.    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   Patient has had a rather subacute course with worsening renal function over the last year which appears to parallel her valvular disease and increasing need for diuretics.  MARYAM at least in part is likely related to CRS  would bladder scan and obtain renal Ultrasound given significant change.  UA no proteinuria nor hematuria suggests more prerenal/ CRS picture as well  Check Uc/s  Diuresing about 400- 500 cc /shift  clinicially she appears dry though CXR r read as pulmonary edema   May need to add thiazide  As hydrodiuril IV  Or metolazone po.    Suggest pulmonary evaluation  As she may have pulmonary disease contributing to SOB    If no improvement in renal function will likely require initiation of dialysis on this admission    Hypokalemia:   she evidently had been diagnosed with hyperaldosteronism as an outpatient   I am very leary about  Placing her on K sparing diuretic with worsening MARYAM/CKD   would liberalize diet at this time  Renal diet, but would liberalize potassium to 3 - 4 grams with diuresing    Agree with sevelamer for phosphate binder would try to avoid  Calcium binders            Anemia in stage 4 chronic kidney disease    Ferritin > 1000 may be an acute phase reactant with low Tsat   no iron for now but may consider again and retest in  1-2 weeks  If low would replenish before starting DENISHA            Thank you for your consult. I will follow-up with patient. Please contact us if you have any additional questions.    Corazon Whitman MD  Nephrology  [facility]

## 2018-09-30 NOTE — SUBJECTIVE & OBJECTIVE
Interval History:   Comfortable on Ra  But still sensation of SOB   uo 800 cc in last 24 hrs, -680cc, on lasix drip at 5 cc/hr  Serum crt up to 5.6 from 5.1 yesterday    Review of patient's allergies indicates:   Allergen Reactions    Ace inhibitors Swelling     Lips Swelling    Hydralazine analogues Other (See Comments)     Lupus exacerbation    Vioxx [rofecoxib] Swelling      lips swelling    Bactrim [sulfamethoxazole-trimethoprim]      Pt would like this medication to be added due to elevation of creatinine with single kidney.    Clonidine Hallucinations     Hallucinations    Lactose     Arthrotec 50 [diclofenac-misoprostol]      Unknown    Bentyl [dicyclomine]      Not effective    Doxycycline Nausea Only    Lomotil [diphenoxylate-atropine] Nausea And Vomiting     Stomach cramps with vomitting    Lozol [indapamide] Rash    Norvasc [amlodipine]      Not tolerated    Tramadol Nausea Only     Current Facility-Administered Medications   Medication Frequency    acetaminophen tablet 650 mg Q4H PRN    aspirin EC tablet 81 mg Daily    atorvastatin tablet 80 mg Daily    carvedilol tablet 6.25 mg BID    doxazosin tablet 1 mg QHS    ergocalciferol capsule 50,000 Units Q7 Days    famotidine tablet 20 mg Daily    furosemide (LASIX) 2 mg/mL in sodium chloride 0.9% 100 mL infusion (conc: 2 mg/mL) Continuous    heparin (porcine) injection 5,000 Units Q8H    hydroxychloroquine tablet 200 mg BID    isosorbide mononitrate 24 hr tablet 60 mg Daily    latanoprost 0.005 % ophthalmic solution 1 drop Daily    levothyroxine tablet 75 mcg Before breakfast    magnesium oxide tablet 400 mg BID    metOLazone tablet 2.5 mg Daily    NIFEdipine 24 hr tablet 60 mg BID    ondansetron disintegrating tablet 4 mg Q8H PRN    ramelteon tablet 8 mg Nightly PRN    senna-docusate 8.6-50 mg per tablet 1 tablet BID PRN    sodium bicarbonate tablet 650 mg TID    sodium chloride 0.9% flush 3 mL Q8H       Objective:      Vital Signs (Most Recent):  Temp: 98.2 °F (36.8 °C) (09/30/18 1137)  Pulse: (!) 57 (09/30/18 1157)  Resp: 20 (09/30/18 1137)  BP: 137/65 (09/30/18 1137)  SpO2: 97 % (09/30/18 1137)  O2 Device (Oxygen Therapy): room air (09/30/18 1137) Vital Signs (24h Range):  Temp:  [98.2 °F (36.8 °C)-98.7 °F (37.1 °C)] 98.2 °F (36.8 °C)  Pulse:  [55-61] 57  Resp:  [18-20] 20  SpO2:  [93 %-97 %] 97 %  BP: (117-156)/(58-74) 137/65     Weight: 51.9 kg (114 lb 8 oz) (09/30/18 0900)  Body mass index is 19.65 kg/m².  Body surface area is 1.53 meters squared.    I/O last 3 completed shifts:  In: 240 [P.O.:240]  Out: 1400 [Urine:1400]    Physical Exam   Constitutional: She is oriented to person, place, and time. She appears well-developed and well-nourished. No distress.   NAD at rest   HENT:   Head: Normocephalic and atraumatic.   Mouth/Throat: No oropharyngeal exudate.   Eyes: Conjunctivae and EOM are normal. Pupils are equal, round, and reactive to light. Right eye exhibits no discharge. Left eye exhibits no discharge. No scleral icterus.   Neck: Normal range of motion. Neck supple. No JVD present. No tracheal deviation present. No thyromegaly present.   Cardiovascular: Normal rate, regular rhythm and intact distal pulses. Exam reveals no gallop.   No murmur heard.  2/6 jennifer   no peripheral edema   Pulmonary/Chest: Effort normal and breath sounds normal. No stridor. No respiratory distress. She has no wheezes. She has no rales. She exhibits no tenderness.   diminished BS at  Bases bilaterally no crackles   Abdominal: Soft. Bowel sounds are normal. She exhibits no distension and no mass. There is no tenderness. There is no rebound and no guarding. No hernia.   Musculoskeletal: She exhibits no edema or tenderness.   Lymphadenopathy:     She has no cervical adenopathy.   Neurological: She is alert and oriented to person, place, and time. She exhibits normal muscle tone.   Skin: Skin is warm and dry. No rash noted. She is not  diaphoretic. No erythema.   Psychiatric: She has a normal mood and affect. Judgment and thought content normal.   Nursing note and vitals reviewed.      Significant Labs:  All labs within the past 24 hours have been reviewed.     Significant Imaging:  Labs: Reviewed  X-Ray: Reviewed

## 2018-09-30 NOTE — PLAN OF CARE
Problem: Patient Care Overview  Goal: Plan of Care Review  Outcome: Ongoing (interventions implemented as appropriate)   09/30/18 0139   Coping/Psychosocial   Plan Of Care Reviewed With patient       Problem: Fall Risk (Adult)  Goal: Absence of Falls  Patient will demonstrate the desired outcomes by discharge/transition of care.  Outcome: Ongoing (interventions implemented as appropriate)   09/30/18 0139   Fall Risk (Adult)   Absence of Falls making progress toward outcome

## 2018-09-30 NOTE — PLAN OF CARE
Problem: Patient Care Overview  Goal: Plan of Care Review  Outcome: Ongoing (interventions implemented as appropriate)   09/30/18 1634   Coping/Psychosocial   Plan Of Care Reviewed With patient   Pt is A,A,o x 4 . Stable V/s. Pt C/O pain in her Lt abd side 5/10 and PRN medication given . Pt is able to turn in bed independently and  ambulate in the bathroom with stand by assist . Pt still on continues lasix drip @ 5 ml/hr , I & O documented in Epic . No significant changes during the day .      Problem: Fall Risk (Adult)  Goal: Absence of Falls  Patient will demonstrate the desired outcomes by discharge/transition of care.  Outcome: Ongoing (interventions implemented as appropriate)   09/30/18 1634   Fall Risk (Adult)   Absence of Falls making progress toward outcome   Pt is free of falls and injuries per shift , fall precautions maintained .

## 2018-10-01 LAB
ALBUMIN SERPL BCP-MCNC: 2.1 G/DL
ANION GAP SERPL CALC-SCNC: 16 MMOL/L
BNP SERPL-MCNC: 2763 PG/ML
BUN SERPL-MCNC: 117 MG/DL
CALCIUM SERPL-MCNC: 8.1 MG/DL
CHLORIDE SERPL-SCNC: 98 MMOL/L
CO2 SERPL-SCNC: 21 MMOL/L
CREAT SERPL-MCNC: 5.3 MG/DL
CREAT UR-MCNC: 32 MG/DL
CRP SERPL-MCNC: 23.9 MG/L
EOSINOPHIL URNS QL WRIGHT STN: NORMAL
ERYTHROCYTE [SEDIMENTATION RATE] IN BLOOD BY WESTERGREN METHOD: 35 MM/HR
EST. GFR  (AFRICAN AMERICAN): 8.7 ML/MIN/1.73 M^2
EST. GFR  (NON AFRICAN AMERICAN): 7.5 ML/MIN/1.73 M^2
GLUCOSE SERPL-MCNC: 70 MG/DL
MAGNESIUM SERPL-MCNC: 1.9 MG/DL
PHOSPHATE SERPL-MCNC: 5.8 MG/DL
POTASSIUM SERPL-SCNC: 3 MMOL/L
SODIUM SERPL-SCNC: 135 MMOL/L
SODIUM UR-SCNC: 101 MMOL/L
UUN UR-MCNC: 236 MG/DL

## 2018-10-01 PROCEDURE — 87205 SMEAR GRAM STAIN: CPT

## 2018-10-01 PROCEDURE — 25000003 PHARM REV CODE 250: Performed by: HOSPITALIST

## 2018-10-01 PROCEDURE — 99231 SBSQ HOSP IP/OBS SF/LOW 25: CPT | Mod: GC,,, | Performed by: INTERNAL MEDICINE

## 2018-10-01 PROCEDURE — 83880 ASSAY OF NATRIURETIC PEPTIDE: CPT

## 2018-10-01 PROCEDURE — 11000001 HC ACUTE MED/SURG PRIVATE ROOM

## 2018-10-01 PROCEDURE — 99232 SBSQ HOSP IP/OBS MODERATE 35: CPT | Mod: GC,,, | Performed by: INTERNAL MEDICINE

## 2018-10-01 PROCEDURE — 80069 RENAL FUNCTION PANEL: CPT

## 2018-10-01 PROCEDURE — 85652 RBC SED RATE AUTOMATED: CPT

## 2018-10-01 PROCEDURE — 84300 ASSAY OF URINE SODIUM: CPT

## 2018-10-01 PROCEDURE — 63600175 PHARM REV CODE 636 W HCPCS: Performed by: HOSPITALIST

## 2018-10-01 PROCEDURE — A4216 STERILE WATER/SALINE, 10 ML: HCPCS | Performed by: HOSPITALIST

## 2018-10-01 PROCEDURE — 83735 ASSAY OF MAGNESIUM: CPT

## 2018-10-01 PROCEDURE — 82570 ASSAY OF URINE CREATININE: CPT

## 2018-10-01 PROCEDURE — 97166 OT EVAL MOD COMPLEX 45 MIN: CPT

## 2018-10-01 PROCEDURE — 36415 COLL VENOUS BLD VENIPUNCTURE: CPT

## 2018-10-01 PROCEDURE — 99233 SBSQ HOSP IP/OBS HIGH 50: CPT | Mod: ,,, | Performed by: HOSPITALIST

## 2018-10-01 PROCEDURE — 86140 C-REACTIVE PROTEIN: CPT

## 2018-10-01 PROCEDURE — 84540 ASSAY OF URINE/UREA-N: CPT

## 2018-10-01 RX ORDER — LANOLIN ALCOHOL/MO/W.PET/CERES
400 CREAM (GRAM) TOPICAL 2 TIMES DAILY
Status: COMPLETED | OUTPATIENT
Start: 2018-10-01 | End: 2018-10-01

## 2018-10-01 RX ORDER — LANOLIN ALCOHOL/MO/W.PET/CERES
400 CREAM (GRAM) TOPICAL 2 TIMES DAILY
Status: DISCONTINUED | OUTPATIENT
Start: 2018-10-01 | End: 2018-10-01

## 2018-10-01 RX ORDER — BUMETANIDE 1 MG/1
1 TABLET ORAL 2 TIMES DAILY
Status: DISCONTINUED | OUTPATIENT
Start: 2018-10-01 | End: 2018-10-02

## 2018-10-01 RX ORDER — POTASSIUM CHLORIDE 20 MEQ/1
40 TABLET, EXTENDED RELEASE ORAL ONCE
Status: COMPLETED | OUTPATIENT
Start: 2018-10-01 | End: 2018-10-01

## 2018-10-01 RX ORDER — FUROSEMIDE 40 MG/1
80 TABLET ORAL 2 TIMES DAILY
Status: DISCONTINUED | OUTPATIENT
Start: 2018-10-01 | End: 2018-10-01

## 2018-10-01 RX ORDER — METOLAZONE 2.5 MG/1
2.5 TABLET ORAL 2 TIMES DAILY
Status: DISCONTINUED | OUTPATIENT
Start: 2018-10-01 | End: 2018-10-02

## 2018-10-01 RX ADMIN — LATANOPROST 1 DROP: 50 SOLUTION OPHTHALMIC at 09:10

## 2018-10-01 RX ADMIN — FUROSEMIDE 10 MG/HR: 10 INJECTION, SOLUTION INTRAMUSCULAR; INTRAVENOUS at 03:10

## 2018-10-01 RX ADMIN — DOXAZOSIN 1 MG: 1 TABLET ORAL at 08:10

## 2018-10-01 RX ADMIN — MAGNESIUM OXIDE TAB 400 MG (241.3 MG ELEMENTAL MG) 400 MG: 400 (241.3 MG) TAB at 08:10

## 2018-10-01 RX ADMIN — MAGNESIUM OXIDE TAB 400 MG (241.3 MG ELEMENTAL MG) 400 MG: 400 (241.3 MG) TAB at 01:10

## 2018-10-01 RX ADMIN — HYDROXYCHLOROQUINE SULFATE 200 MG: 200 TABLET, FILM COATED ORAL at 08:10

## 2018-10-01 RX ADMIN — BUMETANIDE 1 MG: 1 TABLET ORAL at 03:10

## 2018-10-01 RX ADMIN — METOLAZONE 2.5 MG: 2.5 TABLET ORAL at 09:10

## 2018-10-01 RX ADMIN — HYDROXYCHLOROQUINE SULFATE 200 MG: 200 TABLET, FILM COATED ORAL at 09:10

## 2018-10-01 RX ADMIN — NIFEDIPINE 60 MG: 60 TABLET, FILM COATED, EXTENDED RELEASE ORAL at 09:10

## 2018-10-01 RX ADMIN — ACETAMINOPHEN 650 MG: 325 TABLET ORAL at 03:10

## 2018-10-01 RX ADMIN — Medication 3 ML: at 08:10

## 2018-10-01 RX ADMIN — CARVEDILOL 6.25 MG: 6.25 TABLET, FILM COATED ORAL at 09:10

## 2018-10-01 RX ADMIN — LEVOTHYROXINE SODIUM 75 MCG: 75 TABLET ORAL at 05:10

## 2018-10-01 RX ADMIN — HEPARIN SODIUM 5000 UNITS: 5000 INJECTION, SOLUTION INTRAVENOUS; SUBCUTANEOUS at 08:10

## 2018-10-01 RX ADMIN — SODIUM BICARBONATE 650 MG: 325 TABLET ORAL at 03:10

## 2018-10-01 RX ADMIN — HEPARIN SODIUM 5000 UNITS: 5000 INJECTION, SOLUTION INTRAVENOUS; SUBCUTANEOUS at 01:10

## 2018-10-01 RX ADMIN — FAMOTIDINE 20 MG: 20 TABLET ORAL at 09:10

## 2018-10-01 RX ADMIN — METOLAZONE 2.5 MG: 2.5 TABLET ORAL at 03:10

## 2018-10-01 RX ADMIN — ASPIRIN 81 MG: 81 TABLET, COATED ORAL at 09:10

## 2018-10-01 RX ADMIN — ATORVASTATIN CALCIUM 80 MG: 20 TABLET, FILM COATED ORAL at 09:10

## 2018-10-01 RX ADMIN — HEPARIN SODIUM 5000 UNITS: 5000 INJECTION, SOLUTION INTRAVENOUS; SUBCUTANEOUS at 05:10

## 2018-10-01 RX ADMIN — SODIUM BICARBONATE 650 MG: 325 TABLET ORAL at 08:10

## 2018-10-01 RX ADMIN — SODIUM BICARBONATE 650 MG: 325 TABLET ORAL at 09:10

## 2018-10-01 RX ADMIN — POTASSIUM CHLORIDE 40 MEQ: 1500 TABLET, EXTENDED RELEASE ORAL at 01:10

## 2018-10-01 RX ADMIN — ISOSORBIDE MONONITRATE 60 MG: 30 TABLET, EXTENDED RELEASE ORAL at 09:10

## 2018-10-01 RX ADMIN — CARVEDILOL 6.25 MG: 6.25 TABLET, FILM COATED ORAL at 08:10

## 2018-10-01 RX ADMIN — NIFEDIPINE 60 MG: 60 TABLET, FILM COATED, EXTENDED RELEASE ORAL at 08:10

## 2018-10-01 RX ADMIN — Medication 3 ML: at 02:10

## 2018-10-01 NOTE — PROGRESS NOTES
[facility]  Cardiology  Progress Note    Patient Name: Ewelina Arnold  MRN: [id number]  Admission Date: 9/28/2018  Hospital Length of Stay: 3 days  Code Status: Full Code   Attending Physician: Kaye Urias MD   Primary Care Physician: Amarilys Johns MD  Expected Discharge Date: 10/3/2018  Principal Problem:Acute on chronic diastolic congestive heart failure    Subjective:     Hospital Course:   10/1: Doing well this morning, SOB is improved. UOP -600    Interval History: Doing ok, seems fatigued in bed, doing ok some PEDRO.    Review of Systems   Constitution: Negative for chills, decreased appetite and diaphoresis.   HENT: Negative for congestion and ear discharge.    Eyes: Negative for blurred vision and discharge.   Cardiovascular: Positive for dyspnea on exertion. Negative for chest pain, irregular heartbeat, leg swelling and paroxysmal nocturnal dyspnea.   Respiratory: Negative for cough, hemoptysis and shortness of breath.    Gastrointestinal: Negative for abdominal pain.     Objective:     Vital Signs (Most Recent):  Temp: 97.3 °F (36.3 °C) (10/01/18 1155)  Pulse: 65 (10/01/18 1155)  Resp: 17 (10/01/18 1155)  BP: (!) 141/65 (10/01/18 1155)  SpO2: 95 % (10/01/18 1155) Vital Signs (24h Range):  Temp:  [97 °F (36.1 °C)-98.2 °F (36.8 °C)] 97.3 °F (36.3 °C)  Pulse:  [58-78] 65  Resp:  [16-18] 17  SpO2:  [94 %-98 %] 95 %  BP: (128-151)/(58-71) 141/65     Weight: 50.6 kg (111 lb 9.6 oz)  Body mass index is 19.16 kg/m².     SpO2: 95 %  O2 Device (Oxygen Therapy): room air      Intake/Output Summary (Last 24 hours) at 10/1/2018 1324  Last data filed at 10/1/2018 0900  Gross per 24 hour   Intake 100 ml   Output 1500 ml   Net -1400 ml       Lines/Drains/Airways     Peripheral Intravenous Line                 Peripheral IV - Single Lumen 09/28/18 2100 Left;Posterior Forearm 2 days                Physical Exam   Constitutional: She is oriented to person, place, and time. No distress.   Pt looks  fatigued, on room air    HENT:   Head: Normocephalic and atraumatic.   Eyes: Conjunctivae and EOM are normal. Pupils are equal, round, and reactive to light.   Neck: Normal range of motion. No JVD present.   Cardiovascular: Normal rate, regular rhythm and intact distal pulses. Exam reveals no friction rub.   Murmur heard.  Pulmonary/Chest: Effort normal. No respiratory distress. She has no rales.   Abdominal: Soft. She exhibits no distension. There is no tenderness.   Musculoskeletal: Normal range of motion. She exhibits no edema or deformity.   Neurological: She is alert and oriented to person, place, and time. No cranial nerve deficit.   Skin: Skin is warm. No rash noted. No erythema.       Significant Labs:   BMP:   Recent Labs   Lab  09/30/18   0517  10/01/18   0425   GLU  87  70   NA  134*  135*   K  3.3*  3.0*   CL  100  98   CO2  19*  21*   BUN  118*  117*   CREATININE  5.6*  5.3*   CALCIUM  8.2*  8.1*   MG  1.8  1.9   , CMP   Recent Labs   Lab  09/30/18   0517  10/01/18   0425   NA  134*  135*   K  3.3*  3.0*   CL  100  98   CO2  19*  21*   GLU  87  70   BUN  118*  117*   CREATININE  5.6*  5.3*   CALCIUM  8.2*  8.1*   ALBUMIN  2.1*  2.1*   ANIONGAP  15  16   ESTGFRAFRICA  8.1*  8.7*   EGFRNONAA  7.0*  7.5*   , CBC   Recent Labs   Lab  09/30/18 0517   WBC  7.77   HGB  8.1*   HCT  25.2*   PLT  371*   , Lipid Panel No results for input(s): CHOL, HDL, LDLCALC, TRIG, CHOLHDL in the last 48 hours. and Troponin No results for input(s): TROPONINI in the last 48 hours.    Significant Imaging: Echocardiogram:   2D echo with color flow doppler:   Results for orders placed or performed during the hospital encounter of 09/28/18   2D echo with color flow doppler   Result Value Ref Range    EF 60 55 - 65    Mitral Valve Regurgitation MILD     Diastolic Dysfunction Yes (A)     Aortic Valve Regurgitation MILD     Aortic Valve Stenosis MILD (A)     Est. PA Systolic Pressure 42.19 (A)     Pericardial Effusion TRIVIAL      Tricuspid Valve Regurgitation MILD      Assessment and Plan:     Brief HPI: 71 YO F with HFpEF MARYAM on CKD V, Cutaneous lupus, cardiology consulted for diuresis    * Acute on chronic diastolic congestive heart failure    - Continue Diuresis per Nephrology  - UOP is still not impressive, agree that patient may be nearing dialysis per Nephro  - Overall volume status improving    - Cardiology will sign off, please call us with any further questions.          Other forms of systemic lupus erythematosus    - Defer to Nephrology, may need biopsy at some point   - Rapidly worsening renal function            VTE Risk Mitigation (From admission, onward)        Ordered     heparin (porcine) injection 5,000 Units  Every 8 hours      09/28/18 1921     IP VTE HIGH RISK PATIENT  Once      09/28/18 1921          Ochoa House MD  Cardiology  [facility]

## 2018-10-01 NOTE — PROGRESS NOTES
[facility]  Nephrology  Progress Note    Patient Name: Ewelina Arnold  MRN: [id number]  Admission Date: 9/28/2018  Hospital Length of Stay: 3 days  Attending Provider: Kaye Urias MD   Primary Care Physician: Amarilys Johns MD  Principal Problem:MARYAM (acute kidney injury)    Subjective:     HPI: Ms. Ewelina Arnold is a 72 y.o. 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  Patient has had a slowly progressive decline in renal function over the last year . Serum crt 1.6 3/2018 and earlier this month 3.0-3.5 and today 5.4.  Patient presents with worsening SOB and abdominal distention of about a weeks duration.    No lE edema, noN&V, No fever, cough or sputum production.  No dysuria, hematuria, use of NSAIDs  Patient has been bolused and palced on lasix gtt, with about 600 cc UO at this time       Interval History:   Patient reports SOB is improving. Reports she is making urine. Denies nausea/vomiting.   Net negative 875cc 24 hrs.     Review of patient's allergies indicates:   Allergen Reactions    Ace inhibitors Swelling     Lips Swelling    Hydralazine analogues Other (See Comments)     Lupus exacerbation    Vioxx [rofecoxib] Swelling      lips swelling    Bactrim [sulfamethoxazole-trimethoprim]      Pt would like this medication to be added due to elevation of creatinine with single kidney.    Clonidine Hallucinations     Hallucinations    Lactose     Arthrotec 50 [diclofenac-misoprostol]      Unknown    Bentyl [dicyclomine] Other (See Comments)     Not effective    Doxycycline Nausea Only    Lomotil [diphenoxylate-atropine] Nausea And Vomiting     Stomach cramps  with vomitting    Lozol [indapamide] Rash    Norvasc [amlodipine]      Not tolerated    Tramadol Nausea Only     Current Facility-Administered Medications   Medication Frequency    acetaminophen tablet 650 mg Q4H PRN    aspirin EC tablet 81 mg Daily    atorvastatin tablet 80 mg Daily    bumetanide tablet 1 mg BID    carvedilol tablet 6.25 mg BID    doxazosin tablet 1 mg QHS    ergocalciferol capsule 50,000 Units Q7 Days    famotidine tablet 20 mg Daily    heparin (porcine) injection 5,000 Units Q8H    hydroxychloroquine tablet 200 mg BID    isosorbide mononitrate 24 hr tablet 60 mg Daily    latanoprost 0.005 % ophthalmic solution 1 drop Daily    levothyroxine tablet 75 mcg Before breakfast    magnesium oxide tablet 400 mg BID    metOLazone tablet 2.5 mg BID    NIFEdipine 24 hr tablet 60 mg BID    ondansetron disintegrating tablet 4 mg Q8H PRN    ramelteon tablet 8 mg Nightly PRN    senna-docusate 8.6-50 mg per tablet 1 tablet BID PRN    sodium bicarbonate tablet 650 mg TID    sodium chloride 0.9% flush 3 mL Q8H       Objective:     Vital Signs (Most Recent):  Temp: 97.3 °F (36.3 °C) (10/01/18 1623)  Pulse: 63 (10/01/18 1623)  Resp: 16 (10/01/18 1623)  BP: 131/60 (10/01/18 1623)  SpO2: (!) 94 % (10/01/18 1623)  O2 Device (Oxygen Therapy): room air (10/01/18 1623) Vital Signs (24h Range):  Temp:  [97 °F (36.1 °C)-97.9 °F (36.6 °C)] 97.3 °F (36.3 °C)  Pulse:  [58-78] 63  Resp:  [16-18] 16  SpO2:  [94 %-96 %] 94 %  BP: (128-151)/(58-71) 131/60     Weight: 50.6 kg (111 lb 9.6 oz) (10/01/18 1010)  Body mass index is 19.16 kg/m².  Body surface area is 1.51 meters squared.    I/O last 3 completed shifts:  In: 670 [P.O.:670]  Out: 1925 [Urine:1925]    Physical Exam   Constitutional: She is oriented to person, place, and time. She appears well-developed and well-nourished. No distress.   NAD at rest   HENT:   Head: Normocephalic and atraumatic.   Mouth/Throat: No oropharyngeal exudate.   Eyes:  Conjunctivae and EOM are normal. Pupils are equal, round, and reactive to light. Right eye exhibits no discharge. Left eye exhibits no discharge. No scleral icterus.   Neck: Normal range of motion. Neck supple. No JVD present. No tracheal deviation present. No thyromegaly present.   Cardiovascular: Normal rate, regular rhythm and intact distal pulses. Exam reveals no gallop.   Murmur heard.  2/6 jennifer   no peripheral edema   Pulmonary/Chest: Effort normal and breath sounds normal. No stridor. No respiratory distress. She has no wheezes. She has no rales. She exhibits no tenderness.   diminished BS at  Bases bilaterally no crackles   Abdominal: Soft. Bowel sounds are normal. She exhibits no distension and no mass. There is no tenderness. There is no rebound and no guarding. No hernia.   Musculoskeletal: She exhibits no edema or tenderness.   Lymphadenopathy:     She has no cervical adenopathy.   Neurological: She is alert and oriented to person, place, and time. She exhibits normal muscle tone.   Skin: Skin is warm and dry. No rash noted. She is not diaphoretic. No erythema.   Psychiatric: She has a normal mood and affect. Judgment and thought content normal.   Nursing note and vitals reviewed.      Significant Labs:  All labs within the past 24 hours have been reviewed.     Significant Imaging:  Labs: Reviewed    Assessment/Plan:     * MARYAM (acute kidney injury)    Ms. Ewelina Arnold is a 72 y.o. 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    MARYAM superimposed on ckd 4 vs progression of CKD.    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   Patient has had a rather subacute course with worsening renal function over the last year which appears to parallel her valvular disease and increasing need for diuretics.    Plan  - no protein or RBC in urine, complement levels wnl therefore do not believe SLE is not playing a role in her kidney failure. Most like CRS    - CXR  - stop diuresis   - will reevaluate renal function tomorrow; and consider starting patient on dialysis this has been discussed with the patient.                 Metabolic acidosis, normal anion gap (NAG)    Agree with sodium bicarb 650 mg tid, goal serum CO2  24-25          Anemia in stage 4 chronic kidney disease    Ferritin > 1000 may be an acute phase reactant with low Tsat   no iron for now but may consider again and retest in  1-2 weeks  If low would replenish before starting DENISHA            Thank you for your consult. I will follow-up with patient. Please contact us if you have any additional questions.    Maynor Thacker MD  Nephrology  [facility]

## 2018-10-01 NOTE — PLAN OF CARE
Problem: Patient Care Overview  Goal: Plan of Care Review  Outcome: Ongoing (interventions implemented as appropriate)   10/01/18 1822   Coping/Psychosocial   Plan Of Care Reviewed With patient   Pt is A, A,O x 4 . Stable V/s. Pt C/O pain in her Lt abd side once during  the day and PRN tylenol given . Pt is able to turn in bed and ambulate in the room independently . Medications given as scheduled . Lasix drip stopped today per MD order . Pt on oral diuretics now .  I & O documented in Epic .     Problem: Fall Risk (Adult)  Goal: Absence of Falls  Patient will demonstrate the desired outcomes by discharge/transition of care.  Outcome: Ongoing (interventions implemented as appropriate)   10/01/18 1822   Fall Risk (Adult)   Absence of Falls making progress toward outcome   Pt is free of falls and injuries , fall precautions and hourly rounds maintained .

## 2018-10-01 NOTE — SUBJECTIVE & OBJECTIVE
Interval History: Doing ok, seems fatigued in bed, doing ok some PEDRO.    Review of Systems   Constitution: Negative for chills, decreased appetite and diaphoresis.   HENT: Negative for congestion and ear discharge.    Eyes: Negative for blurred vision and discharge.   Cardiovascular: Positive for dyspnea on exertion. Negative for chest pain, irregular heartbeat, leg swelling and paroxysmal nocturnal dyspnea.   Respiratory: Negative for cough, hemoptysis and shortness of breath.    Gastrointestinal: Negative for abdominal pain.     Objective:     Vital Signs (Most Recent):  Temp: 97.3 °F (36.3 °C) (10/01/18 1155)  Pulse: 65 (10/01/18 1155)  Resp: 17 (10/01/18 1155)  BP: (!) 141/65 (10/01/18 1155)  SpO2: 95 % (10/01/18 1155) Vital Signs (24h Range):  Temp:  [97 °F (36.1 °C)-98.2 °F (36.8 °C)] 97.3 °F (36.3 °C)  Pulse:  [58-78] 65  Resp:  [16-18] 17  SpO2:  [94 %-98 %] 95 %  BP: (128-151)/(58-71) 141/65     Weight: 50.6 kg (111 lb 9.6 oz)  Body mass index is 19.16 kg/m².     SpO2: 95 %  O2 Device (Oxygen Therapy): room air      Intake/Output Summary (Last 24 hours) at 10/1/2018 1324  Last data filed at 10/1/2018 0900  Gross per 24 hour   Intake 100 ml   Output 1500 ml   Net -1400 ml       Lines/Drains/Airways     Peripheral Intravenous Line                 Peripheral IV - Single Lumen 09/28/18 2100 Left;Posterior Forearm 2 days                Physical Exam   Constitutional: She is oriented to person, place, and time. No distress.   Pt looks fatigued, on room air    HENT:   Head: Normocephalic and atraumatic.   Eyes: Conjunctivae and EOM are normal. Pupils are equal, round, and reactive to light.   Neck: Normal range of motion. No JVD present.   Cardiovascular: Normal rate, regular rhythm and intact distal pulses. Exam reveals no friction rub.   Murmur heard.  Pulmonary/Chest: Effort normal. No respiratory distress. She has no rales.   Abdominal: Soft. She exhibits no distension. There is no tenderness.    Musculoskeletal: Normal range of motion. She exhibits no edema or deformity.   Neurological: She is alert and oriented to person, place, and time. No cranial nerve deficit.   Skin: Skin is warm. No rash noted. No erythema.       Significant Labs:   BMP:   Recent Labs   Lab  09/30/18   0517  10/01/18   0425   GLU  87  70   NA  134*  135*   K  3.3*  3.0*   CL  100  98   CO2  19*  21*   BUN  118*  117*   CREATININE  5.6*  5.3*   CALCIUM  8.2*  8.1*   MG  1.8  1.9   , CMP   Recent Labs   Lab  09/30/18   0517  10/01/18   0425   NA  134*  135*   K  3.3*  3.0*   CL  100  98   CO2  19*  21*   GLU  87  70   BUN  118*  117*   CREATININE  5.6*  5.3*   CALCIUM  8.2*  8.1*   ALBUMIN  2.1*  2.1*   ANIONGAP  15  16   ESTGFRAFRICA  8.1*  8.7*   EGFRNONAA  7.0*  7.5*   , CBC   Recent Labs   Lab  09/30/18 0517   WBC  7.77   HGB  8.1*   HCT  25.2*   PLT  371*   , Lipid Panel No results for input(s): CHOL, HDL, LDLCALC, TRIG, CHOLHDL in the last 48 hours. and Troponin No results for input(s): TROPONINI in the last 48 hours.    Significant Imaging: Echocardiogram:   2D echo with color flow doppler:   Results for orders placed or performed during the hospital encounter of 09/28/18   2D echo with color flow doppler   Result Value Ref Range    EF 60 55 - 65    Mitral Valve Regurgitation MILD     Diastolic Dysfunction Yes (A)     Aortic Valve Regurgitation MILD     Aortic Valve Stenosis MILD (A)     Est. PA Systolic Pressure 42.19 (A)     Pericardial Effusion TRIVIAL     Tricuspid Valve Regurgitation MILD

## 2018-10-01 NOTE — ASSESSMENT & PLAN NOTE
Ms. Ewelina Arnold is a 72 y.o. 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    MARYAM superimposed on ckd 4 vs progression of CKD.    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   Patient has had a rather subacute course with worsening renal function over the last year which appears to parallel her valvular disease and increasing need for diuretics.    Plan  - no protein or RBC in urine, complement levels wnl therefore do not believe SLE is not playing a role in her kidney failure. Most like CRS    - CXR  - stop diuresis   - will reevaluate renal function tomorrow; and consider starting patient on dialysis this has been discussed with the patient.

## 2018-10-01 NOTE — PLAN OF CARE
10/01/18 1009   Discharge Assessment   Assessment Type Discharge Planning Assessment   Confirmed/corrected address and phone number on facesheet? Yes   Assessment information obtained from? Medical Record   Expected Length of Stay (days) 4   Communicated expected length of stay with patient/caregiver yes   Prior to hospitilization cognitive status: Alert/Oriented   Prior to hospitalization functional status: Independent;Assistive Equipment   Current cognitive status: Alert/Oriented   Current Functional Status: Independent;Assistive Equipment   Lives With alone   Able to Return to Prior Arrangements yes   Is patient able to care for self after discharge? Yes   Patient's perception of discharge disposition home or selfcare   Readmission Within The Last 30 Days previous discharge plan unsuccessful   If yes, most recent facility name: [facility]   Patient currently being followed by outpatient case management? No   Patient currently receives any other outside agency services? No   Equipment Currently Used at Home cane, straight;walker, rolling   Do you have any problems affording any of your prescribed medications? No   Is the patient taking medications as prescribed? yes   Does the patient have transportation home? Yes   Transportation Available family or friend will provide   Does the patient receive services at the Coumadin Clinic? No   Discharge Plan A Home   Patient/Family In Agreement With Plan yes   Readmission Questionnaire   At the time of your discharge, did someone talk to you about what your health problems were? Yes   At the time of discharge, did someone talk to you about what to watch out for regarding worsening of your health problem? Yes   At the time of discharge, did someone talk to you about what to do if you experienced worsening of your health problem? Yes   At the time of discharge, did someone talk to you about which medication to take when you left the hospital and which ones to stop taking? Yes    At the time of discharge, did someone talk to you about when and where to follow up with a doctor after you left the hospital? Yes   How often do you need to have someone help you when you read instructions, pamphlets, or other written material from your doctor or pharmacy? Rarely   Do you have problems taking your medications as prescribed? No   Do you have any problems affording any of  your prescribed medications? No   Do you have problems obtaining/receiving your medications? No   Does the patient have transportation to healthcare appointments? Yes   Living Arrangements house   Does the patient have family/friends to help with healtcare needs after discharge? yes   Does your caregiver provide all the help you need? Yes   Are you currently feeling confused? No   Are you currently having problems thinking? No   Are you currently having memory problems? No   Admitted with CHF. Lives alone and is independent in her ADLs. Plan is to DC home. No DC needs identified. CM provided pt with Senior resources booklet. PT recs are for [facility].

## 2018-10-01 NOTE — ASSESSMENT & PLAN NOTE
- Continue Diuresis per Nephrology  - UOP is still not impressive, agree that patient may be nearing dialysis per Nephro  - Overall volume status improving    - Cardiology will sign off, please call us with any further questions.

## 2018-10-01 NOTE — PROGRESS NOTES
Home Oxygen Evaluation    Date Performed: 10/1/2018    1) Patient's Home O2 Sat on room air, while at rest: 96 %        If O2 sats on room air at rest are 88% or below, patient qualifies. No additional testing needed. Document N/A in steps 2 and 3. If 89% or above, complete steps 2.      2) Patient's O2 Sat on room air while exercisin%        If O2 sats on room air while exercising remain 89% or above patient does not qualify, no further testing needed Document N/A in step 3. If O2 sats on room air while exercising are 88% or below, continue to step 3.      3) Patient's O2 Sat while exercising on O2: N/A at N/A  LPM         (Must show improvement from #2 for patients to qualify)    If O2 sats improve on oxygen, patient qualifies for portable oxygen. If not, the patient does not qualify.

## 2018-10-01 NOTE — PROGRESS NOTES
"     Progress Note  Hospital Medicine     Patient Name: Ewelina Arnold  MRN:  [id number]  Hospital Medicine Team: [facility]  Date of Admission:  9/28/2018     Principal Problem:  Acute on chronic diastolic congestive heart failure   Primary Care Physician: Amarilys Johns MD      History of Present Illness:     Ms. Ewelina Arnold is a 72 y.o. female with PMH of HFpEF with EF 55-60%, CKD stage IV with solitary kidney after donating a kidney, HLD/ HTN, Hypothyroidism, SLE, and moderate aortic stenosis with PABLITO = 1.12 cm2, admitted to hospital medicine as a direct admit from cardiology clinic (Dr Isabel) for ADHF and fluid retention. Patient was asked to stop her lasix 40mg BID approx 1 week ago due to worsening renal function. Since then, pt has experienced abdominal distension, SOB/ PEDRO while talking to the bathroom, as well as decreased urine output. Associated with mild cough, with no hemoptysis or fevers. In addition, patient underwent a treatment course with prednisone for SLE flair approx 3 weeks ago. No LE edema, CP, palpitations, lightheadedness, n/v.     In cardiology clinic, pt was found to have fluid overload with inspiratory crackles at lung bases and + JVD. Labs notable for Cr of 5.1 from 3.8-3.4 baseline, BNP of 3718 from 9549-8132 prior, and pulm edema on CXR. Weight is 114 which he stated is her "dry" weight. However, abd distension and SOB with minimal exertion prompted pt to be evaluated by her PCP and then Cardiology. Cardiology does not believe that aortic valve replacement for her mild stenosis would benefit the pt    Interval History  Net neg 875mL yesterday on Lasix gtt. Down 3 lbs to 111 lb, dry weight is 112 lb. Still reports dyspnea with exertion, but much improved.     Review of Systems   Constitutional: Negative for chill, fever. + fatigue  HENT: Negative for sore throat, trouble swallowing.    Eyes: Negative for photophobia, visual disturbance.   Respiratory: as per " HPI  Cardiovascular: as per HPI  Gastrointestinal: Negative for abdominal pain, constipation, diarrhea, nausea, vomiting.   Endocrine: Negative for cold intolerance, heat intolerance.   Musculoskeletal: Negative for arthralgias, myalgias.   Skin: Negative for rash, wound, erythema   Neurological: Negative for dizziness, syncope, weakness, light-headedness.   Psychiatric/Behavioral: Negative for confusion, hallucinations, anxiety  All other systems reviewed and are negative.    Physical Exam:     Vital Signs (Most Recent):  Temp: 97 °F (36.1 °C) (09/28/18 2022)  Pulse: (!) 59 (09/28/18 2022)  Resp: 18 (09/28/18 2022)  BP: (!) 143/64 (09/28/18 2022)  SpO2: (!) 94 % (09/28/18 2022) Vital Signs Range (Last 24H):  Temp:  [97 °F (36.1 °C)-97.3 °F (36.3 °C)]   Pulse:  [55-62]   Resp:  [18-20]   BP: (120-145)/(48-65)   SpO2:  [93 %-99 %]    Body mass index is 19.64 kg/m².     Physical Exam:  Constitutional: Well-developed, well-nourished, non-distressed, not diaphoretic.   HENT: NC/AT, external ears normal, oropharynx clear, MMM w/o exudates.   Eyes: PERRL, EOMI, conjunctiva normal, no discharge b/l, no scleral icterus   Neck: normal ROM, supple,  CV: RRR, no m/r/g, no carotid bruits, +2 peripheral pulses.  Pulmonary/Chest wall: Breathing comfortably w/o distress, no rales appreciated  GI: Soft, non-tender, (+) BS  Musculoskeletal: Normal ROM, no edema, no atrophy, no tenderness throughout   Neurological: AAO x 4, CN II-XI in tact, nl sensation, nl strength/tone   Skin: warm, dry, (-) erythema, (-) rash, (-)pallor, (-) acanthosis.   Psych: normal mood and affect, normal behavior, thought content and judgement.  Vitals reviewed.    Labs/Radiology:  I have reviewed pertinent labs/imaging in the past 24 hours.     Diagnostic Results:  Echo 8/21/2018  CONCLUSIONS     1 - Normal left ventricular systolic function (EF 60-65%).     2 - Concentric hypertrophy.     3 - Impaired LV relaxation, elevated LAP (grade 2 diastolic  dysfunction).     4 - Normal right ventricular systolic function .     5 - The estimated PA systolic pressure is 38 mmHg.     6 - Mild to moderate aortic regurgitation.     7 - Moderate mitral regurgitation.     8 - Mild to moderate tricuspid regurgitation.     9 - Trivial pericardial effusion.     10 - Severe left atrial enlargement.     11 - No wall motion abnormalities.     12 - Moderate aortic stenosis, PABLITO = 1.12 cm2, AVAi = 0.73 cm2/m2, peak velocity = 4.08 m/s, mean gradient = 33 mmHg. DI = 0.29, increased gradients appear to be related to concomitant Aortic regurgitation. Visually the valve is moderately stenotic as   well.       Assessment and Plan:     Ms. Ewelina Arnold is a 72 y.o. female with PMH of HFpEF with EF 55-60%, CKD stage IV with solitary kidney after donating a kidney, HLD/ HTN, Hypothyroidism, SLE, and moderate aortic stenosis with PABLITO = 1.12 cm2, admitted to hospital medicine as a direct admit from cardiology clinic (Dr Isabel) for ADHF, MARYAM on CKD st 5 and fluid retention    Active Hospital Problems    Diagnosis  POA    *Acute on chronic diastolic congestive heart failure [I50.33]  Yes     Priority: 1 - High     Grade 2 diastolic failure, systolic HF, aortic stenosis      Pulmonary edema cardiac cause [I50.1]  Yes     Priority: 1 - High     Mild-mod pleural effusion on XR today      Metabolic acidosis, normal anion gap (NAG) [E87.2]  Yes     Priority: 2     MARYAM (acute kidney injury) [N17.9]  Yes     Priority: 2     Solitary kidney, acquired [Z90.5]  Not Applicable     Priority: 2     CKD (chronic kidney disease), stage IV [N18.4]  Yes     Priority: 2      Donated kidney to her brother  1/23/2017 patient continues to follow-up with nephrology awaiting renal panel will contact patient results available.  We'll continue to monitor      Nonrheumatic aortic valve stenosis [I35.0]  Yes     Priority: 3      Aortic regurg and stenosis on recent echo, not eligible for TAVR (will not improve  "aortic valve function)      Anemia in stage 4 chronic kidney disease [N18.4, D63.1]  Yes     Priority: 4     Renovascular hypertension [I15.0]  Yes     Priority: 5     Hypovitaminosis D [E55.9]  Yes    GERD (gastroesophageal reflux disease) [K21.9]  Yes    Acquired hypothyroidism [E03.9]  Yes    Coronary artery disease due to calcified coronary lesion [I25.10, I25.84]  Yes    Other forms of systemic lupus erythematosus [M32.8]  Yes     Chronic      Resolved Hospital Problems   No resolved problems to display.       Acute on chronic diastolic congestive heart failure  - BNP of 3718 from 2973-3503 prior, and pulm edema on CXR. Weight is 114 which he stated is her "dry" weight.  - volume retention is likely a component of worsening CKD and / or cardiorenal and/or recent prednisone use and /or recent lasix cessation   - received lasix 100mg IV push on admission, on lasix gtt at 10 mg/hr, will discontinue today  - strict I/O  - daily weights  - cont HF/ HTN meds  - repeat 2D echo similar to previous, EF 60-65%, G2DD  - cardiology consulted  - nephrology consulted - check repeat CXR, BNP, and 6 minute walk test. If still evidence of volume overload, will plan to start oral bumex 1 mg bid and increase metolazone to 2.5 mg bid    MARYAM (acute kidney injury) on CKD (chronic kidney disease), stage IV  Solitary kidney, acquired  Metabolic acidosis, normal anion gap (NAG)   - labs notable for peak Cr of 5.6 from 3-3.8 baseline, 5.3 today  - likely cardiorenal vs progression of CKD  - lasix diuresis as above  - started on sodium bicarb 650 TID given bicarb of 19-20  - follow renal function panel   - nephrology consulted - urine studies pending, considering initiation of HD if no improvement. Low suspicion for lupus flare (no proteinuria or hematuria, negative DsDNA this year)    Nonrheumatic aortic valve stenosis  - 8/2018 echo with Moderate aortic stenosis, PABLITO = 1.12 cm2, AVAi = 0.73 cm2/m2, peak velocity = 4.08 m/s, mean " gradient = 33 mmHg. DI = 0.29, increased gradients appear to be related to concomitant Aortic regurgitation  - cardiology does not believe pt would benefit from AVR  - as above    Anemia in stage 4-5 chronic kidney disease  - hbg of 9, baseline  - anemia studies indicative of chronic disease, 2/2 renal failure    Renovascular hypertension   - decreased coreg 25mg BID to coreg 6.25 bid given HR of 50s  - cont home doxazosin   - cont home isosorbide mononitrate 60mh  - cont home nifedipine 60mg BID    Hypovitaminosis D  - vit D low - ergocalciferol supplementation    GERD   - cont home famotidine    Acquired hypothyroidism  - cont home synthroid    Coronary artery disease due to calcified coronary lesion  - cont home ASA 81 and statin   - BB as above    Other forms of systemic lupus erythematosus  - cont home hydroxychloroquine 200mg bid    Diet:  Low Na, 1200 cc fluid restriction   DVT PPx:  heparin    Disposition:    Anticipate several more days of admission. Needs HHPTOT on discharge      Kaye Urias MD  Hospital Medicine Staff  [facility]

## 2018-10-01 NOTE — SUBJECTIVE & OBJECTIVE
Interval History:   Patient reports SOB is improving. Reports she is making urine. Denies nausea/vomiting.   Net negative 875cc 24 hrs.     Review of patient's allergies indicates:   Allergen Reactions    Ace inhibitors Swelling     Lips Swelling    Hydralazine analogues Other (See Comments)     Lupus exacerbation    Vioxx [rofecoxib] Swelling      lips swelling    Bactrim [sulfamethoxazole-trimethoprim]      Pt would like this medication to be added due to elevation of creatinine with single kidney.    Clonidine Hallucinations     Hallucinations    Lactose     Arthrotec 50 [diclofenac-misoprostol]      Unknown    Bentyl [dicyclomine] Other (See Comments)     Not effective    Doxycycline Nausea Only    Lomotil [diphenoxylate-atropine] Nausea And Vomiting     Stomach cramps with vomitting    Lozol [indapamide] Rash    Norvasc [amlodipine]      Not tolerated    Tramadol Nausea Only     Current Facility-Administered Medications   Medication Frequency    acetaminophen tablet 650 mg Q4H PRN    aspirin EC tablet 81 mg Daily    atorvastatin tablet 80 mg Daily    bumetanide tablet 1 mg BID    carvedilol tablet 6.25 mg BID    doxazosin tablet 1 mg QHS    ergocalciferol capsule 50,000 Units Q7 Days    famotidine tablet 20 mg Daily    heparin (porcine) injection 5,000 Units Q8H    hydroxychloroquine tablet 200 mg BID    isosorbide mononitrate 24 hr tablet 60 mg Daily    latanoprost 0.005 % ophthalmic solution 1 drop Daily    levothyroxine tablet 75 mcg Before breakfast    magnesium oxide tablet 400 mg BID    metOLazone tablet 2.5 mg BID    NIFEdipine 24 hr tablet 60 mg BID    ondansetron disintegrating tablet 4 mg Q8H PRN    ramelteon tablet 8 mg Nightly PRN    senna-docusate 8.6-50 mg per tablet 1 tablet BID PRN    sodium bicarbonate tablet 650 mg TID    sodium chloride 0.9% flush 3 mL Q8H       Objective:     Vital Signs (Most Recent):  Temp: 97.3 °F (36.3 °C) (10/01/18 1623)  Pulse: 63  (10/01/18 1623)  Resp: 16 (10/01/18 1623)  BP: 131/60 (10/01/18 1623)  SpO2: (!) 94 % (10/01/18 1623)  O2 Device (Oxygen Therapy): room air (10/01/18 1623) Vital Signs (24h Range):  Temp:  [97 °F (36.1 °C)-97.9 °F (36.6 °C)] 97.3 °F (36.3 °C)  Pulse:  [58-78] 63  Resp:  [16-18] 16  SpO2:  [94 %-96 %] 94 %  BP: (128-151)/(58-71) 131/60     Weight: 50.6 kg (111 lb 9.6 oz) (10/01/18 1010)  Body mass index is 19.16 kg/m².  Body surface area is 1.51 meters squared.    I/O last 3 completed shifts:  In: 670 [P.O.:670]  Out: 1925 [Urine:1925]    Physical Exam   Constitutional: She is oriented to person, place, and time. She appears well-developed and well-nourished. No distress.   NAD at rest   HENT:   Head: Normocephalic and atraumatic.   Mouth/Throat: No oropharyngeal exudate.   Eyes: Conjunctivae and EOM are normal. Pupils are equal, round, and reactive to light. Right eye exhibits no discharge. Left eye exhibits no discharge. No scleral icterus.   Neck: Normal range of motion. Neck supple. No JVD present. No tracheal deviation present. No thyromegaly present.   Cardiovascular: Normal rate, regular rhythm and intact distal pulses. Exam reveals no gallop.   Murmur heard.  2/6 jennifer   no peripheral edema   Pulmonary/Chest: Effort normal and breath sounds normal. No stridor. No respiratory distress. She has no wheezes. She has no rales. She exhibits no tenderness.   diminished BS at  Bases bilaterally no crackles   Abdominal: Soft. Bowel sounds are normal. She exhibits no distension and no mass. There is no tenderness. There is no rebound and no guarding. No hernia.   Musculoskeletal: She exhibits no edema or tenderness.   Lymphadenopathy:     She has no cervical adenopathy.   Neurological: She is alert and oriented to person, place, and time. She exhibits normal muscle tone.   Skin: Skin is warm and dry. No rash noted. She is not diaphoretic. No erythema.   Psychiatric: She has a normal mood and affect. Judgment and thought  content normal.   Nursing note and vitals reviewed.      Significant Labs:  All labs within the past 24 hours have been reviewed.     Significant Imaging:  Labs: Reviewed

## 2018-10-01 NOTE — PROGRESS NOTES
Attempt to do six min walking with the pt , pt O2 sat at rest on room air is 96 % , start walking and before finishing the first min pt start becoming very SOB and she said she want to go back to her bed , pt's O@ sat remains 98% on room air during that , pt is back in her bed . MD notified .

## 2018-10-02 ENCOUNTER — OUTPATIENT CASE MANAGEMENT (OUTPATIENT)
Dept: ADMINISTRATIVE | Facility: OTHER | Age: 73
End: 2018-10-02

## 2018-10-02 PROBLEM — I50.20 SYSTOLIC CONGESTIVE HEART FAILURE: Status: ACTIVE | Noted: 2017-11-27

## 2018-10-02 LAB
ALBUMIN SERPL BCP-MCNC: 2.1 G/DL
ANION GAP SERPL CALC-SCNC: 13 MMOL/L
BASOPHILS # BLD AUTO: 0.03 K/UL
BASOPHILS NFR BLD: 0.6 %
BUN SERPL-MCNC: 116 MG/DL
CALCIUM SERPL-MCNC: 8.3 MG/DL
CHLORIDE SERPL-SCNC: 98 MMOL/L
CO2 SERPL-SCNC: 25 MMOL/L
CREAT SERPL-MCNC: 4.7 MG/DL
DIFFERENTIAL METHOD: ABNORMAL
EOSINOPHIL # BLD AUTO: 0.1 K/UL
EOSINOPHIL NFR BLD: 2.1 %
ERYTHROCYTE [DISTWIDTH] IN BLOOD BY AUTOMATED COUNT: 13.3 %
EST. GFR  (AFRICAN AMERICAN): 10 ML/MIN/1.73 M^2
EST. GFR  (NON AFRICAN AMERICAN): 8.7 ML/MIN/1.73 M^2
GLUCOSE SERPL-MCNC: 54 MG/DL
HCT VFR BLD AUTO: 28.5 %
HGB BLD-MCNC: 9.4 G/DL
IMM GRANULOCYTES # BLD AUTO: 0.04 K/UL
IMM GRANULOCYTES NFR BLD AUTO: 0.8 %
LYMPHOCYTES # BLD AUTO: 0.9 K/UL
LYMPHOCYTES NFR BLD: 17.3 %
MAGNESIUM SERPL-MCNC: 1.9 MG/DL
MCH RBC QN AUTO: 26.9 PG
MCHC RBC AUTO-ENTMCNC: 33 G/DL
MCV RBC AUTO: 82 FL
MONOCYTES # BLD AUTO: 0.9 K/UL
MONOCYTES NFR BLD: 17.3 %
NEUTROPHILS # BLD AUTO: 3.3 K/UL
NEUTROPHILS NFR BLD: 61.9 %
NRBC BLD-RTO: 0 /100 WBC
PHOSPHATE SERPL-MCNC: 4.9 MG/DL
PLATELET # BLD AUTO: 442 K/UL
PMV BLD AUTO: 9.8 FL
POTASSIUM SERPL-SCNC: 3.6 MMOL/L
RBC # BLD AUTO: 3.49 M/UL
SODIUM SERPL-SCNC: 136 MMOL/L
WBC # BLD AUTO: 5.25 K/UL

## 2018-10-02 PROCEDURE — 82272 OCCULT BLD FECES 1-3 TESTS: CPT

## 2018-10-02 PROCEDURE — 99232 SBSQ HOSP IP/OBS MODERATE 35: CPT | Mod: GC,,, | Performed by: INTERNAL MEDICINE

## 2018-10-02 PROCEDURE — 97116 GAIT TRAINING THERAPY: CPT

## 2018-10-02 PROCEDURE — 25000003 PHARM REV CODE 250: Performed by: HOSPITALIST

## 2018-10-02 PROCEDURE — 83735 ASSAY OF MAGNESIUM: CPT

## 2018-10-02 PROCEDURE — 36415 COLL VENOUS BLD VENIPUNCTURE: CPT

## 2018-10-02 PROCEDURE — 80069 RENAL FUNCTION PANEL: CPT

## 2018-10-02 PROCEDURE — 97530 THERAPEUTIC ACTIVITIES: CPT

## 2018-10-02 PROCEDURE — 99233 SBSQ HOSP IP/OBS HIGH 50: CPT | Mod: ,,, | Performed by: HOSPITALIST

## 2018-10-02 PROCEDURE — 11000001 HC ACUTE MED/SURG PRIVATE ROOM

## 2018-10-02 PROCEDURE — 85025 COMPLETE CBC W/AUTO DIFF WBC: CPT

## 2018-10-02 PROCEDURE — 63600175 PHARM REV CODE 636 W HCPCS: Performed by: HOSPITALIST

## 2018-10-02 PROCEDURE — A4216 STERILE WATER/SALINE, 10 ML: HCPCS | Performed by: HOSPITALIST

## 2018-10-02 RX ORDER — METOLAZONE 2.5 MG/1
2.5 TABLET ORAL 2 TIMES DAILY
Status: DISCONTINUED | OUTPATIENT
Start: 2018-10-02 | End: 2018-10-02

## 2018-10-02 RX ORDER — BUMETANIDE 1 MG/1
1 TABLET ORAL 2 TIMES DAILY
Status: DISCONTINUED | OUTPATIENT
Start: 2018-10-02 | End: 2018-10-02

## 2018-10-02 RX ORDER — BACITRACIN 500 [USP'U]/G
OINTMENT TOPICAL
Status: DISCONTINUED | OUTPATIENT
Start: 2018-10-02 | End: 2018-10-04 | Stop reason: HOSPADM

## 2018-10-02 RX ADMIN — HEPARIN SODIUM 5000 UNITS: 5000 INJECTION, SOLUTION INTRAVENOUS; SUBCUTANEOUS at 05:10

## 2018-10-02 RX ADMIN — NIFEDIPINE 60 MG: 60 TABLET, FILM COATED, EXTENDED RELEASE ORAL at 08:10

## 2018-10-02 RX ADMIN — LEVOTHYROXINE SODIUM 75 MCG: 75 TABLET ORAL at 05:10

## 2018-10-02 RX ADMIN — HYDROXYCHLOROQUINE SULFATE 200 MG: 200 TABLET, FILM COATED ORAL at 08:10

## 2018-10-02 RX ADMIN — METOLAZONE 2.5 MG: 2.5 TABLET ORAL at 10:10

## 2018-10-02 RX ADMIN — LATANOPROST 1 DROP: 50 SOLUTION OPHTHALMIC at 10:10

## 2018-10-02 RX ADMIN — Medication 3 ML: at 03:10

## 2018-10-02 RX ADMIN — DOXAZOSIN 1 MG: 1 TABLET ORAL at 08:10

## 2018-10-02 RX ADMIN — ISOSORBIDE MONONITRATE 60 MG: 30 TABLET, EXTENDED RELEASE ORAL at 10:10

## 2018-10-02 RX ADMIN — FAMOTIDINE 20 MG: 20 TABLET ORAL at 10:10

## 2018-10-02 RX ADMIN — ASPIRIN 81 MG: 81 TABLET, COATED ORAL at 10:10

## 2018-10-02 RX ADMIN — Medication 3 ML: at 08:10

## 2018-10-02 RX ADMIN — SODIUM BICARBONATE 650 MG: 325 TABLET ORAL at 08:10

## 2018-10-02 RX ADMIN — SODIUM BICARBONATE 650 MG: 325 TABLET ORAL at 03:10

## 2018-10-02 RX ADMIN — BACITRACIN: 500 OINTMENT TOPICAL at 08:10

## 2018-10-02 RX ADMIN — HEPARIN SODIUM 5000 UNITS: 5000 INJECTION, SOLUTION INTRAVENOUS; SUBCUTANEOUS at 03:10

## 2018-10-02 RX ADMIN — SODIUM BICARBONATE 650 MG: 325 TABLET ORAL at 10:10

## 2018-10-02 RX ADMIN — CARVEDILOL 6.25 MG: 6.25 TABLET, FILM COATED ORAL at 08:10

## 2018-10-02 RX ADMIN — CARVEDILOL 6.25 MG: 6.25 TABLET, FILM COATED ORAL at 10:10

## 2018-10-02 RX ADMIN — ATORVASTATIN CALCIUM 80 MG: 20 TABLET, FILM COATED ORAL at 10:10

## 2018-10-02 RX ADMIN — NIFEDIPINE 60 MG: 60 TABLET, FILM COATED, EXTENDED RELEASE ORAL at 10:10

## 2018-10-02 RX ADMIN — BUMETANIDE 1 MG: 1 TABLET ORAL at 10:10

## 2018-10-02 RX ADMIN — HEPARIN SODIUM 5000 UNITS: 5000 INJECTION, SOLUTION INTRAVENOUS; SUBCUTANEOUS at 09:10

## 2018-10-02 RX ADMIN — HYDROXYCHLOROQUINE SULFATE 200 MG: 200 TABLET, FILM COATED ORAL at 10:10

## 2018-10-02 NOTE — PLAN OF CARE
met with pt in room.  Pt is an alert lady who lives alone and plans to return.  Pt states if skilled care is medically necessary she is only [place] [facility] [place].  sw will follow.

## 2018-10-02 NOTE — PROGRESS NOTES
"     Progress Note  Hospital Medicine     Patient Name: Ewelina Arnold  MRN:  [id number]  Hospital Medicine Team: [facility] MED [facility]  Date of Admission:  9/28/2018     Principal Problem:  Acute on chronic diastolic congestive heart failure   Primary Care Physician: Amarilys Johns MD      History of Present Illness:     Ms. Ewelina Arnold is a 72 y.o. female with PMH of HFpEF with EF 55-60%, CKD stage IV with solitary kidney after donating a kidney, HLD/ HTN, Hypothyroidism, SLE, and moderate aortic stenosis with PABLITO = 1.12 cm2, admitted to hospital medicine as a direct admit from cardiology clinic (Dr Isabel) for ADHF and fluid retention. Patient was asked to stop her lasix 40mg BID approx 1 week ago due to worsening renal function. Since then, pt has experienced abdominal distension, SOB/ PEDRO while talking to the bathroom, as well as decreased urine output. Associated with mild cough, with no hemoptysis or fevers. In addition, patient underwent a treatment course with prednisone for SLE flair approx 3 weeks ago. No LE edema, CP, palpitations, lightheadedness, n/v.     In cardiology clinic, pt was found to have fluid overload with inspiratory crackles at lung bases and + JVD. Labs notable for Cr of 5.1 from 3.8-3.4 baseline, BNP of 3718 from 4164-2490 prior, and pulm edema on CXR. Weight is 114 which he stated is her "dry" weight. However, abd distension and SOB with minimal exertion prompted pt to be evaluated by her PCP and then Cardiology. Cardiology does not believe that aortic valve replacement for her mild stenosis would benefit the pt    Interval History  Net neg 975mL yesterday on Bumex 1 mg bid and metolazone 2.5 mg bid. Lasix gtt stopped yesterday. Down 4 lbs to 107 lb, dry weight is 112 lb per patient. Denies dyspnea when she walked to the bathroom this AM, only fatigue.     Review of Systems   Constitutional: Negative for chill, fever. + fatigue  HENT: Negative for sore throat, trouble swallowing.    Eyes: " Negative for photophobia, visual disturbance.   Respiratory: as per HPI  Cardiovascular: as per HPI  Gastrointestinal: Negative for abdominal pain, constipation, diarrhea, nausea, vomiting.   Endocrine: Negative for cold intolerance, heat intolerance.   Musculoskeletal: Negative for arthralgias, myalgias.   Skin: Negative for rash, wound, erythema   Neurological: Negative for dizziness, syncope, weakness, light-headedness.   Psychiatric/Behavioral: Negative for confusion, hallucinations, anxiety  All other systems reviewed and are negative.    Physical Exam:     Vital Signs (Most Recent):  Temp: 97 °F (36.1 °C) (09/28/18 2022)  Pulse: (!) 59 (09/28/18 2022)  Resp: 18 (09/28/18 2022)  BP: (!) 143/64 (09/28/18 2022)  SpO2: (!) 94 % (09/28/18 2022) Vital Signs Range (Last 24H):  Temp:  [97 °F (36.1 °C)-97.3 °F (36.3 °C)]   Pulse:  [55-62]   Resp:  [18-20]   BP: (120-145)/(48-65)   SpO2:  [93 %-99 %]    Body mass index is 19.64 kg/m².     Physical Exam:  Constitutional: Well-developed, well-nourished, non-distressed, not diaphoretic.   HENT: NC/AT, external ears normal, oropharynx clear, MMM w/o exudates.   Eyes: PERRL, EOMI, conjunctiva normal, no discharge b/l, no scleral icterus   Neck: normal ROM, supple,  CV: RRR, no m/r/g, no carotid bruits, +2 peripheral pulses.  Pulmonary/Chest wall: Breathing comfortably w/o distress, no rales appreciated  GI: Soft, non-tender, (+) BS  Musculoskeletal: Normal ROM, no edema, no atrophy, no tenderness throughout   Neurological: AAO x 4, CN II-XI in tact, nl sensation, nl strength/tone   Skin: warm, dry, (-) erythema, (-) rash, (-)pallor, (-) acanthosis.   Psych: normal mood and affect, normal behavior, thought content and judgement.  Vitals reviewed.    Labs/Radiology:  I have reviewed pertinent labs/imaging in the past 24 hours.     Diagnostic Results:  Echo 8/21/2018  CONCLUSIONS     1 - Normal left ventricular systolic function (EF 60-65%).     2 - Concentric hypertrophy.      3 - Impaired LV relaxation, elevated LAP (grade 2 diastolic dysfunction).     4 - Normal right ventricular systolic function .     5 - The estimated PA systolic pressure is 38 mmHg.     6 - Mild to moderate aortic regurgitation.     7 - Moderate mitral regurgitation.     8 - Mild to moderate tricuspid regurgitation.     9 - Trivial pericardial effusion.     10 - Severe left atrial enlargement.     11 - No wall motion abnormalities.     12 - Moderate aortic stenosis, PABLITO = 1.12 cm2, AVAi = 0.73 cm2/m2, peak velocity = 4.08 m/s, mean gradient = 33 mmHg. DI = 0.29, increased gradients appear to be related to concomitant Aortic regurgitation. Visually the valve is moderately stenotic as   well.       Assessment and Plan:     Ms. Ewelina Arnold is a 72 y.o. female with PMH of HFpEF with EF 55-60%, CKD stage IV with solitary kidney after donating a kidney, HLD/ HTN, Hypothyroidism, SLE, and moderate aortic stenosis with PABLITO = 1.12 cm2, admitted to hospital medicine as a direct admit from cardiology clinic (Dr Isabel) for ADHF, MARYAM on CKD st 5 and fluid retention    Active Hospital Problems    Diagnosis  POA    *Acute on chronic diastolic congestive heart failure [I50.33]  Yes     Priority: 1 - High     Grade 2 diastolic failure, systolic HF, aortic stenosis      Pulmonary edema cardiac cause [I50.1]  Yes     Priority: 1 - High     Mild-mod pleural effusion on XR today      Metabolic acidosis, normal anion gap (NAG) [E87.2]  Yes     Priority: 2     MARYAM (acute kidney injury) [N17.9]  Yes     Priority: 2     Solitary kidney, acquired [Z90.5]  Not Applicable     Priority: 2     CKD (chronic kidney disease), stage IV [N18.4]  Yes     Priority: 2      Donated kidney to her brother  1/23/2017 patient continues to follow-up with nephrology awaiting renal panel will contact patient results available.  We'll continue to monitor      Nonrheumatic aortic valve stenosis [I35.0]  Yes     Priority: 3      Aortic regurg and  "stenosis on recent echo, not eligible for TAVR (will not improve aortic valve function)      Anemia in stage 4 chronic kidney disease [N18.4, D63.1]  Yes     Priority: 4     Renovascular hypertension [I15.0]  Yes     Priority: 5     Hypovitaminosis D [E55.9]  Yes    GERD (gastroesophageal reflux disease) [K21.9]  Yes    Acquired hypothyroidism [E03.9]  Yes    Coronary artery disease due to calcified coronary lesion [I25.10, I25.84]  Yes    Other forms of systemic lupus erythematosus [M32.8]  Yes     Chronic      Resolved Hospital Problems   No resolved problems to display.       Acute on chronic diastolic congestive heart failure  - BNP of 3718 from 8483-4982 prior, and pulm edema on CXR. Weight is 114 which he stated is her "dry" weight.  - volume retention is likely a component of worsening CKD and / or cardiorenal and/or recent prednisone use and /or recent lasix cessation   - received lasix 100mg IV push on admission, started on lasix gtt at 10 mg/hr, discontinued 10/1  - transitioned from lasix gtt to bumex 1 mg bid and metolazone increased to 2.5 mg bid on 10/1, will continue diuresis at current dosing.  - repeat CXR 10/1 w/ edema, unchanged  - strict I/O  - daily weights  - cont HF/ HTN meds  - repeat 2D echo similar to previous, EF 60-65%, G2DD  - cardiology consulted - recommend diuresis, signed off    MARYAM (acute kidney injury) on CKD (chronic kidney disease), stage IV  Solitary kidney, acquired  Metabolic acidosis, normal anion gap (NAG)   - labs notable for peak Cr of 5.6 from 3-3.8 baseline, 4.7 today  - likely cardiorenal vs progression of CKD  - lasix diuresis as above  - started on sodium bicarb 650 TID given bicarb of 19-20  - follow renal function panel   - nephrology consulted - considering initiation of HD if no improvement. Low suspicion for lupus flare (no proteinuria or hematuria, negative DsDNA this year). Continue diuresis.    Nonrheumatic aortic valve stenosis  - 8/2018 echo with " Moderate aortic stenosis, PABLITO = 1.12 cm2, AVAi = 0.73 cm2/m2, peak velocity = 4.08 m/s, mean gradient = 33 mmHg. DI = 0.29, increased gradients appear to be related to concomitant Aortic regurgitation  - cardiology does not believe pt would benefit from AVR  - as above    Anemia in stage 4-5 chronic kidney disease  - hbg of 9, baseline  - anemia studies indicative of chronic disease, 2/2 renal failure    Renovascular hypertension   - decreased coreg 25mg BID to coreg 6.25 bid given HR of 50s  - cont home doxazosin   - cont home isosorbide mononitrate 60mh  - cont home nifedipine 60mg BID    Hypovitaminosis D  - vit D low - ergocalciferol supplementation    GERD   - cont home famotidine    Acquired hypothyroidism  - cont home synthroid    Coronary artery disease due to calcified coronary lesion  - cont home ASA 81 and statin   - BB as above    Other forms of systemic lupus erythematosus  - cont home hydroxychloroquine 200mg bid    Diet:  Low Na, 1200 cc fluid restriction   DVT PPx:  heparin    Disposition:    Continue diuresis and monitoring of renal function. Final dispo per Nephrology. OT recommending SNF placement.      Kaye Urias MD  Hospital Medicine Staff  [facility]

## 2018-10-02 NOTE — PLAN OF CARE
Problem: Physical Therapy Goal  Goal: Physical Therapy Goal  Goals to be met by: 10 days (10/10)    Patient will increase functional independence with mobility by performin. Supine to sit with Set-up [place]  2. Sit to supine with Set-up [place]  3. Sit to stand transfer with Supervision  4. Gait  x 200 feet with Supervision using no assistive device  5. Lower extremity exercise program x30 reps per handout, with independence to improve muscular strength and endurance.   6. Pt will ambulate 6 steps with (B) HR and SBA.        Outcome: Ongoing (interventions implemented as appropriate)  Goals remain appropriate.

## 2018-10-02 NOTE — PLAN OF CARE
Problem: Occupational Therapy Goal  Goal: Occupational Therapy Goal  Goals to be met by: 10/15    Patient will increase functional independence with ADLs by performing:    UE Dressing with [place].  LE Dressing with Contact Guard Assistance.  Grooming while seated with Set-up Assistance.  Toileting from toilet with Contact Guard Assistance for hygiene and clothing management.     Outcome: Ongoing (interventions implemented as appropriate)  Evaluation complete and goals set.  Cont with POC  Janeth Ricci OT  10/1/2018

## 2018-10-02 NOTE — PROGRESS NOTES
Summary:  Chart reviewed in epic.  Noted pt went to cardiology appointment and was admitted per cardiology for increase sob and chf symptoms ( JVD, cxr +infiltrates)    10/2  Message from dr. Templeton and she is aware of pt status and agrees she will need dialysis  Interventions:  Message to dr. chavira about pt admission and status also messaged about low albumin  She returned message advising to have pt increase protein intake with healthy meat and also nepro is a good protein source and approved for use with renal pts    Plan:  Call to skilled and possible consult to dietician   Advise pt on nepro supplement  Review nutrition and supplements to increase albumin  Follow up on contact from palliative care team.  Follow up on receipt of education material  Resend if not received next encounter  Review low sodium diet  Follow up on receipt of [employer] OTC catalog      Todays OPCM Self-Management Care Plan was developed with the patients/caregivers input and was based on identified barriers from todays assessment.  Goals were written today with the patient/caregiver and the patient has agreed to work towards these goals to improve his/her overall well-being. Patient verbalized understanding of the care plan, goals, and all of today's instructions. Encouraged patient/caregiver to communicate with his/her physician and health care team about health conditions and the treatment plan.  Provided my contact information today and encouraged patient/caregiver to call me with any questions as needed.

## 2018-10-02 NOTE — PT/OT/SLP EVAL
Occupational Therapy   Evaluation    Name: Ewelnia Arnold  MRN: [id number]  Admitting Diagnosis:  MARYAM (acute kidney injury)      Recommendations:     Discharge Recommendations: nursing facility, skilled  Discharge Equipment Recommendations:  none  Barriers to discharge:  Decreased caregiver support    History:     Occupational Profile:  Living Environment: Pt lives alone and with limited assistance available   Previous level of function: Pt reports she was able to complete basic self care without assistance but with poor home management   Equipment Used at Home:  cane, straight, walker, rolling  Assistance upon Discharge: limited assistance     Past Medical History:   Diagnosis Date    Acute hypoxemic respiratory failure 4/28/2018    Acute on chronic diastolic congestive heart failure 11/17/2017    Allergy     Anatomical narrow angle glaucoma     Anemia     States diagnosed about a month ago    Aortic atherosclerosis     Arthritis     Cataract     CHF (congestive heart failure)     Chronic kidney disease     Chronic sciatica of left side     Right lower back pain due to sciatica    Coronary artery disease     Depression     Dry eyes     GERD (gastroesophageal reflux disease)     History of colon cancer     Hyperaldosteronism     Hyperlipidemia     Hypertension     Hypothyroidism     Keloid cicatrix     Left ventricular diastolic dysfunction with preserved systolic function     Lupus (systemic lupus erythematosus) 8/17/2012    Malnutrition 5/16/2017    Osteoarthritis of cervical spine 8/17/2012    Osteopenia     PAD (peripheral artery disease)     FRAN (renal artery stenosis)        Past Surgical History:   Procedure Laterality Date    BIOPSY-BONE MARROW Left 10/16/2017    Performed by Grant [facility]illan [facility] at [facility] OR [address] [place] [address]    CARDIAC CATHETERIZATION  07/27/2011    x1    CHOLECYSTECTOMY  1999    COLON SURGERY  2000 & 2003    partial removal    COLONOSCOPY N/A 4/14/2016    Procedure:  COLONOSCOPY;  Surgeon: Jose Steen MD;  Location: [address]);  Service: Endoscopy;  Laterality: N/A;  prep 2 days prior light meals only/clear liquid day before  and 4 dulcolax tabs (5 mgs) at noon    COLONOSCOPY N/A 9/14/2017    Procedure: COLONOSCOPY;  Surgeon: Jose Steen MD;  Location: [address]);  Service: Endoscopy;  Laterality: N/A;    COLONOSCOPY N/A 9/14/2017    [facility]formed by [facility] at [facility] ([facility])    COLONOSCOPY N/A 4/14/2016    Performed by [facility] at [facility] ([facility])    COLONOSCOPY N/A 4/23/2013    Performed by Jose Ste[facility] MD at [facility] ([facility] F[facility])    TALIA-TRANSFORAMINAL Left 11/3/2016    Performed by Brett [facility] at [facility]    ESOPHAGOGASTRODUODENOSCOPY (EGD) N/A 3/16/2017    [facility] ENDO    EYE SURGERY      HAND SURGERY Bilateral 1996 & 1997    due to carpal tunnel     HERNIA REPAIR      HYSTERECTOMY  1983    INJECTION-STEROID-EPIDURAL-TRANSFORAMINAL Left 1/7/2016    Performed by Brett [facility] at [facility]    Left heart cath Left 12/20/2017    [facility]an 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  Patient found all lines intact and peripheral IV, telemetry upon OT entry to room.    General Precautions: Standard, fall   Orthopedic Precautions:N/A   Braces: N/A     Occupational Performance:    Bed Mobility:    · Pt with min A for  "supine to sit edge of bed     Functional Mobility/Transfers:  · Functional Mobility: Pt with min A with sit to stand and poor endurance     Activities of Daily Living:  · Pt with overall min + a with upper extremity self care completion. Mod A for lower extremity     Cognitive/Visual Perceptual:  Cognitive/Psychosocial Skills:     -       Oriented to: Person, Place, Time and Situation   -       Follows Commands/attention:Follows two-step commands  -       Communication: clear/fluent  -       Memory: No Deficits noted  -       Safety awareness/insight to disability: intact   -       Mood/Affect/Coping skills/emotional control: Appropriate to situation    Physical Exam:    Upper Extremity Strength:  -       Right Upper Extremity: 2/5    -       Left Upper Extremity: 2/5          AMPAC 6 Click ADL:  AMPAC Total Score: 18    Treatment & Education:  Evaluation complete and goals set. Pt educated on safety, role of OT, importance of increased participation in self care for gains , expectations for participation, expectations for gains, POC, energy conservation, caregiver strain. White board updated.     Education:    Patient left supine with all lines intact    Assessment:     Ewelina Arnold is a 72 y.o. female with a medical diagnosis of MARYAM (acute kidney injury). Pt with decreased self care and activity tolerance and would benefit from SS SNF stay for return to  She presents with the following performance deficits affecting function: impaired endurance, impaired self care skills, impaired functional mobilty, gait instability, impaired cardiopulmonary response to activity.      Rehab Prognosis: Good; patient would benefit from acute skilled OT services to address these deficits and reach maximum level of function.         Clinical Decision Makin.  OT Low:  "Pt evaluation falls under low complexity for evaluation coding due to performance deficits noted in 1-3 areas as stated above and 0 co-morbities affecting " "current functional status. Data obtained from problem focused assessments. No modifications or assistance was required for completion of evaluation. Only brief occupational profile and history review completed."     Plan:     Patient to be seen 3 x/week to address the above listed problems via self-care/home management, therapeutic activities, therapeutic exercises  · Plan of Care Expires: 11/01/18  · Plan of Care Reviewed with: patient    This Plan of care has been discussed with the patient who was involved in its development and understands and is in agreement with the identified goals and treatment plan    GOALS:   Multidisciplinary Problems     Occupational Therapy Goals        Problem: Occupational Therapy Goal    Goal Priority Disciplines Outcome Interventions   Occupational Therapy Goal     OT, PT/OT Ongoing (interventions implemented as appropriate)    Description:  Goals to be met by: 10/15    Patient will increase functional independence with ADLs by performing:    UE Dressing with Belle.  LE Dressing with Contact Guard Assistance.  Grooming while seated with Set-up Assistance.  Toileting from toilet with Contact Guard Assistance for hygiene and clothing management.                       Time Tracking:     OT Date of Treatment: 10/01/18  OT Start Time: 1330  OT Stop Time: 1350  OT Total Time (min): 20 min    Billable Minutes:Evaluation 20    Janeth Ricci, OT  10/1/2018    "

## 2018-10-02 NOTE — PROGRESS NOTES
[facility]  Nephrology  Progress Note    Patient Name: Ewelina Arnold  MRN: [id number]  Admission Date: 9/28/2018  Hospital Length of Stay: 4 days  Attending Provider: Kaye Urias MD   Primary Care Physician: Amarilys Johns MD  Principal Problem:MARYAM (acute kidney injury)    Subjective:     HPI: Ms. Ewelina Arnold is a 72 y.o. 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  Patient has had a slowly progressive decline in renal function over the last year . Serum crt 1.6 3/2018 and earlier this month 3.0-3.5 and today 5.4.  Patient presents with worsening SOB and abdominal distention of about a weeks duration.    No lE edema, noN&V, No fever, cough or sputum production.  No dysuria, hematuria, use of NSAIDs  Patient has been bolused and palced on lasix gtt, with about 600 cc UO at this time       Interval History:   NAEON. Patient off lasix ggt, transitioned to bumex 1mg and metolazone 2.5mg. Reports she was extremely weak and felt lightheaded when working with PT.     Review of patient's allergies indicates:   Allergen Reactions    Ace inhibitors Swelling     Lips Swelling    Hydralazine analogues Other (See Comments)     Lupus exacerbation    Vioxx [rofecoxib] Swelling      lips swelling    Bactrim [sulfamethoxazole-trimethoprim]      Pt would like this medication to be added due to elevation of creatinine with single kidney.    Clonidine Hallucinations     Hallucinations    Lactose     Arthrotec 50 [diclofenac-misoprostol]      Unknown    Bentyl [dicyclomine] Other (See Comments)     Not effective    Doxycycline Nausea Only    Lomotil [diphenoxylate-atropine]  Nausea And Vomiting     Stomach cramps with vomitting    Lozol [indapamide] Rash    Norvasc [amlodipine]      Not tolerated    Tramadol Nausea Only     Current Facility-Administered Medications   Medication Frequency    acetaminophen tablet 650 mg Q4H PRN    aspirin EC tablet 81 mg Daily    atorvastatin tablet 80 mg Daily    bumetanide tablet 1 mg BID    carvedilol tablet 6.25 mg BID    doxazosin tablet 1 mg QHS    ergocalciferol capsule 50,000 Units Q7 Days    famotidine tablet 20 mg Daily    heparin (porcine) injection 5,000 Units Q8H    hydroxychloroquine tablet 200 mg BID    isosorbide mononitrate 24 hr tablet 60 mg Daily    latanoprost 0.005 % ophthalmic solution 1 drop Daily    levothyroxine tablet 75 mcg Before breakfast    metOLazone tablet 2.5 mg BID    NIFEdipine 24 hr tablet 60 mg BID    ondansetron disintegrating tablet 4 mg Q8H PRN    ramelteon tablet 8 mg Nightly PRN    senna-docusate 8.6-50 mg per tablet 1 tablet BID PRN    sodium bicarbonate tablet 650 mg TID    sodium chloride 0.9% flush 3 mL Q8H       Objective:     Vital Signs (Most Recent):  Temp: 97.3 °F (36.3 °C) (10/02/18 1651)  Pulse: 66 (10/02/18 1651)  Resp: 17 (10/02/18 1651)  BP: (!) 142/66 (10/02/18 1651)  SpO2: 97 % (10/02/18 1651)  O2 Device (Oxygen Therapy): room air (10/02/18 1651) Vital Signs (24h Range):  Temp:  [96.2 °F (35.7 °C)-98.4 °F (36.9 °C)] 97.3 °F (36.3 °C)  Pulse:  [61-74] 66  Resp:  [16-20] 17  SpO2:  [93 %-98 %] 97 %  BP: (127-150)/(57-70) 142/66     Weight: 48.7 kg (107 lb 5.8 oz) (10/02/18 0812)  Body mass index is 18.43 kg/m².  Body surface area is 1.48 meters squared.    I/O last 3 completed shifts:  In: 625 [P.O.:625]  Out: 2400 [Urine:2400]    Physical Exam   Constitutional: She is oriented to person, place, and time. She appears well-developed and well-nourished. No distress.   NAD at rest   HENT:   Head: Normocephalic and atraumatic.   Mouth/Throat: No oropharyngeal exudate.   Eyes:  Conjunctivae and EOM are normal. Pupils are equal, round, and reactive to light. Right eye exhibits no discharge. Left eye exhibits no discharge. No scleral icterus.   Neck: Normal range of motion. Neck supple. No JVD present. No tracheal deviation present. No thyromegaly present.   Cardiovascular: Normal rate, regular rhythm and intact distal pulses. Exam reveals no gallop.   Murmur heard.  2/6 jennifer   no peripheral edema   Pulmonary/Chest: Effort normal and breath sounds normal. No stridor. No respiratory distress. She has no wheezes. She has no rales. She exhibits no tenderness.   Abdominal: Soft. Bowel sounds are normal. She exhibits no distension and no mass. There is no tenderness. There is no rebound and no guarding. No hernia.   Musculoskeletal: She exhibits no edema or tenderness.   Lymphadenopathy:     She has no cervical adenopathy.   Neurological: She is alert and oriented to person, place, and time. She exhibits normal muscle tone.   Skin: Skin is warm and dry. No rash noted. She is not diaphoretic. No erythema.   Psychiatric: She has a normal mood and affect. Judgment and thought content normal.   Nursing note and vitals reviewed.      Significant Labs:  All labs within the past 24 hours have been reviewed.     Significant Imaging:  Labs: Reviewed    Assessment/Plan:     * MARYAM (acute kidney injury)    Ms. Ewelina Arnold is a 72 y.o. 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    MARYAM superimposed on ckd 4 vs progression of CKD.    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   Patient has had a rather subacute course  "with worsening renal function over the last year which appears to parallel her valvular disease and increasing need for diuretics.    - no protein or RBC in urine, complement levels wnl therefore do not believe SLE is not playing a role in her kidney failure. Most like CRS    - CXR; reviewed patient does not appear to be volume overloaded       Plan  - stop diuresis (considering she felt as she was going to "passout" would recommend holding diuresis today; possibly intervascular volume depleted)     - she does not need emergent dialysis but will require it long-term    - would recommend PFT's as outpatient; her smoking history is not overly concerning (2 packs x 15yrs) her SOB is difficult to explain from purely a volume overload standpoint                       Thank you for your consult. I will follow-up with patient. Please contact us if you have any additional questions.    Maynor Thacker MD  Nephrology  [facility]  "

## 2018-10-02 NOTE — PT/OT/SLP PROGRESS
"Physical Therapy Treatment    Patient Name:  Ewelina Arnold   MRN:  [id number]    Recommendations:     Discharge Recommendations:  nursing facility, skilled   Discharge Equipment Recommendations: none   Barriers to discharge: Inaccessible home and Decreased caregiver support    Assessment:     Ewelina Arnold is a 72 y.o. female admitted with a medical diagnosis of MARYAM (acute kidney injury).  She presents with the following impairments/functional limitations:  weakness, impaired endurance, impaired self care skills, impaired functional mobilty, gait instability, impaired balance, impaired cardiopulmonary response to activity, decreased safety awareness. Pt tolerated session fairly with focus on gait training, transfers, and endurance. Pt progression limited this day by endurance and instability noted with gait training. Pt c/o SOB and seated rest needed after short gait distance. PTA informs and discusses with PT with decision to change d/c recommendation to  SNF for post acute care to further address pts endurance and balance. Pt will continue to benefit from therapy services to improve impairments listed above.     Rehab Prognosis:  Good ; patient would benefit from acute skilled PT services to address these deficits and reach maximum level of function.      Recent Surgery: * No surgery found *      Plan:     During this hospitalization, patient to be seen 3 x/week to address the above listed problems via gait training, therapeutic activities, therapeutic exercises  · Plan of Care Expires:  10/30/18   Plan of Care Reviewed with: patient    Subjective     Communicated with NSG prior to session.  Patient found supine upon PTA entry to room, agreeable to treatment.      Chief Complaint: fatigue, cold  Patient comments/goals: "I feel so tired, and it is freezing in this place."   Pain/Comfort:  · Pain Rating 1: 0/10  · Pain Rating Post-Intervention 1: 0/10    Patients cultural, spiritual, Jain conflicts given the " "current situation: none stated    Objective:     Patient found with: peripheral IV, telemetry     General Precautions: Standard, fall   Orthopedic Precautions:N/A   Braces: N/A     Functional Mobility:  · Bed Mobility:     · Supine to Sit: contact guard assistance  · Sit to Supine: contact guard assistance  · Transfers:     · Sit to Stand:  contact guard assistance with no AD  · Gait: Pt ambulates 60 ft and 72 ft with no AD and Min A for balance support and safety. Pt unsteady with several minor lateral LOBs with c/o SOB and fatigue requiring return to sitting. Slow pooja, narrow BRISSA, poor reaction to LOB.   · Balance: Pt sits with SBA at EOB with c/o fatigue and feeling that she "requires support". Pt stands with CGA/Min A for balance.       AM-PAC 6 CLICK MOBILITY  Turning over in bed (including adjusting bedclothes, sheets and blankets)?: 3  Sitting down on and standing up from a chair with arms (e.g., wheelchair, bedside commode, etc.): 3  Moving from lying on back to sitting on the side of the bed?: 3  Moving to and from a bed to a chair (including a wheelchair)?: 3  Need to walk in hospital room?: 3  Climbing 3-5 steps with a railing?: 3  Basic Mobility Total Score: 18       Therapeutic Activities and Exercises:  Pt assisted with functional mobility as noted above.   Pt requires increased time to perform tasks and increased seated rests.   Pt educated on safety with all mobility, importance of increased OOB mobility, deep breathing, energy conservation, PT POC, and d/c recommendations.       Patient left HOB elevated with call button in reach.    GOALS:   Multidisciplinary Problems     Physical Therapy Goals        Problem: Physical Therapy Goal    Goal Priority Disciplines Outcome Goal Variances Interventions   Physical Therapy Goal     PT, PT/OT Ongoing (interventions implemented as appropriate)     Description:  Goals to be met by: 10 days (10/10)    Patient will increase functional independence with " mobility by performin. Supine to sit with Set-up Sullivan  2. Sit to supine with Set-up Sullivan  3. Sit to stand transfer with Supervision  4. Gait  x 200 feet with Supervision using no assistive device  5. Lower extremity exercise program x30 reps per handout, with independence to improve muscular strength and endurance.   6. Pt will ambulate 6 steps with (B) HR and SBA.                         Time Tracking:     PT Received On: 10/02/18  PT Start Time: 1345     PT Stop Time: 1412  PT Total Time (min): 27 min     Billable Minutes: Gait Training 15 and Therapeutic Activity 12    Treatment Type: Treatment  PT/PTA: PTA     PTA Visit Number: 1     Ruddy Figueroa, PTA  10/02/2018

## 2018-10-02 NOTE — ASSESSMENT & PLAN NOTE
"Ms. Ewelina Arnold is a 72 y.o. 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    MARYAM superimposed on ckd 4 vs progression of CKD.    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   Patient has had a rather subacute course with worsening renal function over the last year which appears to parallel her valvular disease and increasing need for diuretics.    - no protein or RBC in urine, complement levels wnl therefore do not believe SLE is not playing a role in her kidney failure. Most like CRS    - CXR; reviewed patient does not appear to be volume overloaded       Plan  - stop diuresis (considering she felt as she was going to "passout" would recommend holding diuresis today; possibly intervascular volume depleted)     - she does not need emergent dialysis but will require it long-term    - would recommend PFT's as outpatient; her smoking history is not overly concerning (2 packs x 15yrs) her SOB is difficult to explain from purely a volume overload standpoint             "

## 2018-10-02 NOTE — SUBJECTIVE & OBJECTIVE
Interval History:   NAEON. Patient off lasix ggt, transitioned to bumex 1mg and metolazone 2.5mg. Reports she was extremely weak and felt lightheaded when working with PT.     Review of patient's allergies indicates:   Allergen Reactions    Ace inhibitors Swelling     Lips Swelling    Hydralazine analogues Other (See Comments)     Lupus exacerbation    Vioxx [rofecoxib] Swelling      lips swelling    Bactrim [sulfamethoxazole-trimethoprim]      Pt would like this medication to be added due to elevation of creatinine with single kidney.    Clonidine Hallucinations     Hallucinations    Lactose     Arthrotec 50 [diclofenac-misoprostol]      Unknown    Bentyl [dicyclomine] Other (See Comments)     Not effective    Doxycycline Nausea Only    Lomotil [diphenoxylate-atropine] Nausea And Vomiting     Stomach cramps with vomitting    Lozol [indapamide] Rash    Norvasc [amlodipine]      Not tolerated    Tramadol Nausea Only     Current Facility-Administered Medications   Medication Frequency    acetaminophen tablet 650 mg Q4H PRN    aspirin EC tablet 81 mg Daily    atorvastatin tablet 80 mg Daily    bumetanide tablet 1 mg BID    carvedilol tablet 6.25 mg BID    doxazosin tablet 1 mg QHS    ergocalciferol capsule 50,000 Units Q7 Days    famotidine tablet 20 mg Daily    heparin (porcine) injection 5,000 Units Q8H    hydroxychloroquine tablet 200 mg BID    isosorbide mononitrate 24 hr tablet 60 mg Daily    latanoprost 0.005 % ophthalmic solution 1 drop Daily    levothyroxine tablet 75 mcg Before breakfast    metOLazone tablet 2.5 mg BID    NIFEdipine 24 hr tablet 60 mg BID    ondansetron disintegrating tablet 4 mg Q8H PRN    ramelteon tablet 8 mg Nightly PRN    senna-docusate 8.6-50 mg per tablet 1 tablet BID PRN    sodium bicarbonate tablet 650 mg TID    sodium chloride 0.9% flush 3 mL Q8H       Objective:     Vital Signs (Most Recent):  Temp: 97.3 °F (36.3 °C) (10/02/18 1651)  Pulse: 66  (10/02/18 1651)  Resp: 17 (10/02/18 1651)  BP: (!) 142/66 (10/02/18 1651)  SpO2: 97 % (10/02/18 1651)  O2 Device (Oxygen Therapy): room air (10/02/18 1651) Vital Signs (24h Range):  Temp:  [96.2 °F (35.7 °C)-98.4 °F (36.9 °C)] 97.3 °F (36.3 °C)  Pulse:  [61-74] 66  Resp:  [16-20] 17  SpO2:  [93 %-98 %] 97 %  BP: (127-150)/(57-70) 142/66     Weight: 48.7 kg (107 lb 5.8 oz) (10/02/18 0812)  Body mass index is 18.43 kg/m².  Body surface area is 1.48 meters squared.    I/O last 3 completed shifts:  In: 625 [P.O.:625]  Out: 2400 [Urine:2400]    Physical Exam   Constitutional: She is oriented to person, place, and time. She appears well-developed and well-nourished. No distress.   NAD at rest   HENT:   Head: Normocephalic and atraumatic.   Mouth/Throat: No oropharyngeal exudate.   Eyes: Conjunctivae and EOM are normal. Pupils are equal, round, and reactive to light. Right eye exhibits no discharge. Left eye exhibits no discharge. No scleral icterus.   Neck: Normal range of motion. Neck supple. No JVD present. No tracheal deviation present. No thyromegaly present.   Cardiovascular: Normal rate, regular rhythm and intact distal pulses. Exam reveals no gallop.   Murmur heard.  2/6 jennifer   no peripheral edema   Pulmonary/Chest: Effort normal and breath sounds normal. No stridor. No respiratory distress. She has no wheezes. She has no rales. She exhibits no tenderness.   Abdominal: Soft. Bowel sounds are normal. She exhibits no distension and no mass. There is no tenderness. There is no rebound and no guarding. No hernia.   Musculoskeletal: She exhibits no edema or tenderness.   Lymphadenopathy:     She has no cervical adenopathy.   Neurological: She is alert and oriented to person, place, and time. She exhibits normal muscle tone.   Skin: Skin is warm and dry. No rash noted. She is not diaphoretic. No erythema.   Psychiatric: She has a normal mood and affect. Judgment and thought content normal.   Nursing note and vitals  reviewed.      Significant Labs:  All labs within the past 24 hours have been reviewed.     Significant Imaging:  Labs: Reviewed

## 2018-10-03 ENCOUNTER — TELEPHONE (OUTPATIENT)
Dept: PRIMARY CARE CLINIC | Facility: CLINIC | Age: 73
End: 2018-10-03

## 2018-10-03 LAB
ALBUMIN SERPL BCP-MCNC: 2.1 G/DL
ANION GAP SERPL CALC-SCNC: 13 MMOL/L
BUN SERPL-MCNC: 119 MG/DL
CALCIUM SERPL-MCNC: 8.6 MG/DL
CHLORIDE SERPL-SCNC: 100 MMOL/L
CO2 SERPL-SCNC: 27 MMOL/L
CREAT SERPL-MCNC: 4.5 MG/DL
EST. GFR  (AFRICAN AMERICAN): 10.6 ML/MIN/1.73 M^2
EST. GFR  (NON AFRICAN AMERICAN): 9.2 ML/MIN/1.73 M^2
GLUCOSE SERPL-MCNC: 55 MG/DL
MAGNESIUM SERPL-MCNC: 2 MG/DL
OB PNL STL: POSITIVE
PHOSPHATE SERPL-MCNC: 4.8 MG/DL
POTASSIUM SERPL-SCNC: 3.5 MMOL/L
SODIUM SERPL-SCNC: 140 MMOL/L

## 2018-10-03 PROCEDURE — 11000001 HC ACUTE MED/SURG PRIVATE ROOM

## 2018-10-03 PROCEDURE — 25000003 PHARM REV CODE 250: Performed by: HOSPITALIST

## 2018-10-03 PROCEDURE — 99233 SBSQ HOSP IP/OBS HIGH 50: CPT | Mod: ,,, | Performed by: HOSPITALIST

## 2018-10-03 PROCEDURE — A4216 STERILE WATER/SALINE, 10 ML: HCPCS | Performed by: HOSPITALIST

## 2018-10-03 PROCEDURE — 63600175 PHARM REV CODE 636 W HCPCS: Performed by: HOSPITALIST

## 2018-10-03 PROCEDURE — 83735 ASSAY OF MAGNESIUM: CPT

## 2018-10-03 PROCEDURE — 80069 RENAL FUNCTION PANEL: CPT

## 2018-10-03 PROCEDURE — 36415 COLL VENOUS BLD VENIPUNCTURE: CPT

## 2018-10-03 RX ORDER — PANTOPRAZOLE SODIUM 40 MG/1
40 TABLET, DELAYED RELEASE ORAL
Status: DISCONTINUED | OUTPATIENT
Start: 2018-10-03 | End: 2018-10-04 | Stop reason: HOSPADM

## 2018-10-03 RX ORDER — LOPERAMIDE HYDROCHLORIDE 2 MG/1
2 CAPSULE ORAL ONCE
Status: DISCONTINUED | OUTPATIENT
Start: 2018-10-03 | End: 2018-10-03

## 2018-10-03 RX ORDER — CALCIUM CARBONATE 200(500)MG
1000 TABLET,CHEWABLE ORAL ONCE
Status: COMPLETED | OUTPATIENT
Start: 2018-10-03 | End: 2018-10-03

## 2018-10-03 RX ADMIN — ATORVASTATIN CALCIUM 80 MG: 20 TABLET, FILM COATED ORAL at 09:10

## 2018-10-03 RX ADMIN — CALCIUM CARBONATE (ANTACID) CHEW TAB 500 MG 1000 MG: 500 CHEW TAB at 01:10

## 2018-10-03 RX ADMIN — CARVEDILOL 6.25 MG: 6.25 TABLET, FILM COATED ORAL at 08:10

## 2018-10-03 RX ADMIN — NIFEDIPINE 60 MG: 60 TABLET, FILM COATED, EXTENDED RELEASE ORAL at 09:10

## 2018-10-03 RX ADMIN — PANTOPRAZOLE SODIUM 40 MG: 40 TABLET, DELAYED RELEASE ORAL at 05:10

## 2018-10-03 RX ADMIN — LATANOPROST 1 DROP: 50 SOLUTION OPHTHALMIC at 09:10

## 2018-10-03 RX ADMIN — PANTOPRAZOLE SODIUM 40 MG: 40 TABLET, DELAYED RELEASE ORAL at 09:10

## 2018-10-03 RX ADMIN — Medication 3 ML: at 08:10

## 2018-10-03 RX ADMIN — HEPARIN SODIUM 5000 UNITS: 5000 INJECTION, SOLUTION INTRAVENOUS; SUBCUTANEOUS at 09:10

## 2018-10-03 RX ADMIN — Medication 3 ML: at 05:10

## 2018-10-03 RX ADMIN — ASPIRIN 81 MG: 81 TABLET, COATED ORAL at 09:10

## 2018-10-03 RX ADMIN — ISOSORBIDE MONONITRATE 60 MG: 30 TABLET, EXTENDED RELEASE ORAL at 09:10

## 2018-10-03 RX ADMIN — HEPARIN SODIUM 5000 UNITS: 5000 INJECTION, SOLUTION INTRAVENOUS; SUBCUTANEOUS at 02:10

## 2018-10-03 RX ADMIN — DOXAZOSIN 1 MG: 1 TABLET ORAL at 08:10

## 2018-10-03 RX ADMIN — HEPARIN SODIUM 5000 UNITS: 5000 INJECTION, SOLUTION INTRAVENOUS; SUBCUTANEOUS at 05:10

## 2018-10-03 RX ADMIN — NIFEDIPINE 60 MG: 60 TABLET, FILM COATED, EXTENDED RELEASE ORAL at 08:10

## 2018-10-03 RX ADMIN — CARVEDILOL 6.25 MG: 6.25 TABLET, FILM COATED ORAL at 09:10

## 2018-10-03 RX ADMIN — HYDROXYCHLOROQUINE SULFATE 200 MG: 200 TABLET, FILM COATED ORAL at 09:10

## 2018-10-03 RX ADMIN — HYDROXYCHLOROQUINE SULFATE 200 MG: 200 TABLET, FILM COATED ORAL at 08:10

## 2018-10-03 RX ADMIN — LEVOTHYROXINE SODIUM 75 MCG: 75 TABLET ORAL at 05:10

## 2018-10-03 RX ADMIN — SODIUM BICARBONATE 650 MG: 325 TABLET ORAL at 08:10

## 2018-10-03 RX ADMIN — SODIUM BICARBONATE 650 MG: 325 TABLET ORAL at 09:10

## 2018-10-03 RX ADMIN — Medication 3 ML: at 02:10

## 2018-10-03 RX ADMIN — SODIUM BICARBONATE 650 MG: 325 TABLET ORAL at 02:10

## 2018-10-03 NOTE — PROGRESS NOTES
"     Progress Note  Hospital Medicine     Patient Name: Ewelina Arnold  MRN:  [id number]  Hospital Medicine Team: [facility]OS[facility] MED [facility]  Date of Admission:  9/28/2018     Principal Problem:  Acute on chronic diastolic congestive heart failure   Primary Care Physician: Amarilys Johns MD      History of Present Illness:     Ms. Ewelina Arnold is a 72 y.o. female with PMH of HFpEF with EF 55-60%, CKD stage IV with solitary kidney after donating a kidney, HLD/ HTN, Hypothyroidism, SLE, and moderate aortic stenosis with PABLITO = 1.12 cm2, admitted to hospital medicine as a direct admit from cardiology clinic (Dr Isabel) for ADHF and fluid retention. Patient was asked to stop her lasix 40mg BID approx 1 week ago due to worsening renal function. Since then, pt has experienced abdominal distension, SOB/ PEDRO while talking to the bathroom, as well as decreased urine output. Associated with mild cough, with no hemoptysis or fevers. In addition, patient underwent a treatment course with prednisone for SLE flair approx 3 weeks ago. No LE edema, CP, palpitations, lightheadedness, n/v.     In cardiology clinic, pt was found to have fluid overload with inspiratory crackles at lung bases and + JVD. Labs notable for Cr of 5.1 from 3.8-3.4 baseline, BNP of 3718 from 2674-2890 prior, and pulm edema on CXR. Weight is 114 which he stated is her "dry" weight. However, abd distension and SOB with minimal exertion prompted pt to be evaluated by her PCP and then Cardiology. Cardiology does not believe that aortic valve replacement for her mild stenosis would benefit the pt    Interval History  States she still feels short of breath and fatigued when walking to the bathroom, but this is improved from yesterday. Oral diuretics held yesterday due to feeling lightheaded.     Review of Systems   Constitutional: Negative for chill, fever. + fatigue  HENT: Negative for sore throat, trouble swallowing.    Eyes: Negative for photophobia, visual disturbance. "   Respiratory: as per HPI  Cardiovascular: as per HPI  Gastrointestinal: Negative for abdominal pain, constipation, diarrhea, nausea, vomiting.   Endocrine: Negative for cold intolerance, heat intolerance.   Musculoskeletal: Negative for arthralgias, myalgias.   Skin: Negative for rash, wound, erythema   Neurological: Negative for dizziness, syncope, weakness, light-headedness.   Psychiatric/Behavioral: Negative for confusion, hallucinations, anxiety  All other systems reviewed and are negative.    Physical Exam:     Vital Signs (Most Recent):  Temp: 97 °F (36.1 °C) (09/28/18 2022)  Pulse: (!) 59 (09/28/18 2022)  Resp: 18 (09/28/18 2022)  BP: (!) 143/64 (09/28/18 2022)  SpO2: (!) 94 % (09/28/18 2022) Vital Signs Range (Last 24H):  Temp:  [97 °F (36.1 °C)-97.3 °F (36.3 °C)]   Pulse:  [55-62]   Resp:  [18-20]   BP: (120-145)/(48-65)   SpO2:  [93 %-99 %]    Body mass index is 19.64 kg/m².     Physical Exam:  Constitutional: Well-developed, well-nourished, non-distressed, not diaphoretic.   HENT: NC/AT, external ears normal, oropharynx clear, MMM w/o exudates.   Eyes: PERRL, EOMI, conjunctiva normal, no discharge b/l, no scleral icterus   Neck: normal ROM, supple,  CV: RRR, no m/r/g, no carotid bruits, +2 peripheral pulses.  Pulmonary/Chest wall: Breathing comfortably w/o distress, no rales appreciated  GI: Soft, non-tender, (+) BS  Musculoskeletal: Normal ROM, no edema, no atrophy, no tenderness throughout   Neurological: AAO x 4, CN II-XI in tact, nl sensation, nl strength/tone   Skin: warm, dry, (-) erythema, (-) rash, (-)pallor, (-) acanthosis.   Psych: normal mood and affect, normal behavior, thought content and judgement.  Vitals reviewed.    Labs/Radiology:  I have reviewed pertinent labs/imaging in the past 24 hours.     Diagnostic Results:  Echo 8/21/2018  CONCLUSIONS     1 - Normal left ventricular systolic function (EF 60-65%).     2 - Concentric hypertrophy.     3 - Impaired LV relaxation, elevated LAP  (grade 2 diastolic dysfunction).     4 - Normal right ventricular systolic function .     5 - The estimated PA systolic pressure is 38 mmHg.     6 - Mild to moderate aortic regurgitation.     7 - Moderate mitral regurgitation.     8 - Mild to moderate tricuspid regurgitation.     9 - Trivial pericardial effusion.     10 - Severe left atrial enlargement.     11 - No wall motion abnormalities.     12 - Moderate aortic stenosis, PABLITO = 1.12 cm2, AVAi = 0.73 cm2/m2, peak velocity = 4.08 m/s, mean gradient = 33 mmHg. DI = 0.29, increased gradients appear to be related to concomitant Aortic regurgitation. Visually the valve is moderately stenotic as   well.       Assessment and Plan:     Ms. Ewelina Arnold is a 72 y.o. female with PMH of HFpEF with EF 55-60%, CKD stage IV with solitary kidney after donating a kidney, HLD/ HTN, Hypothyroidism, SLE, and moderate aortic stenosis with PABLITO = 1.12 cm2, admitted to hospital medicine as a direct admit from cardiology clinic (Dr Isabel) for ADHF, MARYAM on CKD st 5 and fluid retention    Active Hospital Problems    Diagnosis  POA    *Acute on chronic diastolic congestive heart failure [I50.33]  Yes     Priority: 1 - High     Grade 2 diastolic failure, systolic HF, aortic stenosis      Pulmonary edema cardiac cause [I50.1]  Yes     Priority: 1 - High     Mild-mod pleural effusion on XR today      Metabolic acidosis, normal anion gap (NAG) [E87.2]  Yes     Priority: 2     MARYAM (acute kidney injury) [N17.9]  Yes     Priority: 2     Solitary kidney, acquired [Z90.5]  Not Applicable     Priority: 2     CKD (chronic kidney disease), stage IV [N18.4]  Yes     Priority: 2      Donated kidney to her brother  1/23/2017 patient continues to follow-up with nephrology awaiting renal panel will contact patient results available.  We'll continue to monitor      Nonrheumatic aortic valve stenosis [I35.0]  Yes     Priority: 3      Aortic regurg and stenosis on recent echo, not eligible for TAVR  "(will not improve aortic valve function)      Anemia in stage 4 chronic kidney disease [N18.4, D63.1]  Yes     Priority: 4     Renovascular hypertension [I15.0]  Yes     Priority: 5     Hypovitaminosis D [E55.9]  Yes    GERD (gastroesophageal reflux disease) [K21.9]  Yes    Acquired hypothyroidism [E03.9]  Yes    Coronary artery disease due to calcified coronary lesion [I25.10, I25.84]  Yes    Other forms of systemic lupus erythematosus [M32.8]  Yes     Chronic      Resolved Hospital Problems   No resolved problems to display.       Acute on chronic diastolic congestive heart failure  - BNP of 3718 from 8642-1031 prior, and pulm edema on CXR. Weight is 114 which he stated is her "dry" weight.  - volume retention is likely a component of worsening CKD and / or cardiorenal and/or recent prednisone use and /or recent lasix cessation   - received lasix 100mg IV push on admission, started on lasix gtt at 10 mg/hr, discontinued 10/1  - transitioned from lasix gtt to bumex 1 mg bid and metolazone increased to 2.5 mg bid on 10/1. Diuresis held starting yesterday  - repeat CXR 10/1 w/ edema, unchanged  - strict I/O  - daily weights  - cont HF/ HTN meds  - repeat 2D echo similar to previous, EF 60-65%, G2DD  - cardiology consulted - recommend diuresis, signed off  - appreciate nephrology assistance    MARYAM (acute kidney injury) on CKD (chronic kidney disease), stage IV  Solitary kidney, acquired  Metabolic acidosis, normal anion gap (NAG)   - labs notable for peak Cr of 5.6 from 3-3.8 baseline, 4.7 today  - likely cardiorenal vs progression of CKD  - improved with diuresis  - started on sodium bicarb 650 TID given bicarb of 19-20  - follow renal function panel   - nephrology consulted - considering initiation of HD if no improvement. Low suspicion for lupus flare (no proteinuria or hematuria, negative DsDNA this year).    Nonrheumatic aortic valve stenosis  - 8/2018 echo with Moderate aortic stenosis, PABLITO = 1.12 cm2, " AVAi = 0.73 cm2/m2, peak velocity = 4.08 m/s, mean gradient = 33 mmHg. DI = 0.29, increased gradients appear to be related to concomitant Aortic regurgitation  - cardiology does not believe pt would benefit from AVR  - as above    Anemia in stage 4-5 chronic kidney disease  - hbg of 9, baseline  - anemia studies indicative of chronic disease, 2/2 renal failure    Renovascular hypertension   - decreased coreg 25mg BID to coreg 6.25 bid given HR of 50s  - cont home doxazosin   - cont home isosorbide mononitrate 60mh  - cont home nifedipine 60mg BID  - BP's more elevated now - may need to increase dosing of anti-hypertensives    Hypovitaminosis D  - vit D low - ergocalciferol supplementation    GERD   - cont home famotidine    Acquired hypothyroidism  - cont home synthroid    Coronary artery disease due to calcified coronary lesion  - cont home ASA 81 and statin   - BB as above    Other forms of systemic lupus erythematosus  - cont home hydroxychloroquine 200mg bid    Diet:  Low Na, 1200 cc fluid restriction   DVT PPx:  heparin    Disposition:    Final dispo per Nephrology. Currently working on SNF placement.      Kaye Urias MD  Hospital Medicine Staff  [facility]

## 2018-10-03 NOTE — PLAN OF CARE
Pt has been accepted to [facility] skilled unit and can possibly transfer tomorrow once insurance authorization is received.  sw will follow in the morning.

## 2018-10-03 NOTE — NURSING
Dr brar notified of pt's c/o stomach discomfort and frequent trips to bathroom for scant amts of stool,orders to follow.ctm.

## 2018-10-03 NOTE — TELEPHONE ENCOUNTER
Nimo-  Patient should increase her oral protein intake through healthy meats. Nepro has a lot of protein and is approved for people with ESRD.    Thanks,  KJ

## 2018-10-03 NOTE — TELEPHONE ENCOUNTER
----- Message from Nimo Mercado RN sent at 10/3/2018  2:35 PM CDT -----  Do you have any ideas on supplement or other to address the hypoalbuminemia?    Nimo Mercado RN,[facility]  Outpatient Complex Case Management  [phone]

## 2018-10-04 ENCOUNTER — HOSPITAL ENCOUNTER (INPATIENT)
Facility: HOSPITAL | Age: 73
LOS: 8 days | Discharge: HOME HEALTH | DRG: 948 | End: 2018-10-12
Attending: HOSPITALIST | Admitting: HOSPITALIST
Payer: MEDICARE

## 2018-10-04 VITALS
TEMPERATURE: 98 F | RESPIRATION RATE: 18 BRPM | WEIGHT: 107.13 LBS | HEART RATE: 65 BPM | BODY MASS INDEX: 18.29 KG/M2 | OXYGEN SATURATION: 96 % | DIASTOLIC BLOOD PRESSURE: 72 MMHG | SYSTOLIC BLOOD PRESSURE: 160 MMHG | HEIGHT: 64 IN

## 2018-10-04 DIAGNOSIS — E44.0 MALNUTRITION OF MODERATE DEGREE: ICD-10-CM

## 2018-10-04 DIAGNOSIS — H40.243 RESIDUAL STAGE OF ANGLE-CLOSURE GLAUCOMA OF BOTH EYES: ICD-10-CM

## 2018-10-04 DIAGNOSIS — I25.10 CORONARY ARTERY DISEASE DUE TO CALCIFIED CORONARY LESION: ICD-10-CM

## 2018-10-04 DIAGNOSIS — K21.9 GASTROESOPHAGEAL REFLUX DISEASE WITHOUT ESOPHAGITIS: ICD-10-CM

## 2018-10-04 DIAGNOSIS — I50.9 CHF (CONGESTIVE HEART FAILURE): ICD-10-CM

## 2018-10-04 DIAGNOSIS — I50.32 CHRONIC DIASTOLIC CONGESTIVE HEART FAILURE: ICD-10-CM

## 2018-10-04 DIAGNOSIS — R53.81 DEBILITY: Primary | ICD-10-CM

## 2018-10-04 DIAGNOSIS — R60.9 EDEMA, UNSPECIFIED TYPE: ICD-10-CM

## 2018-10-04 DIAGNOSIS — M32.19 OTHER SYSTEMIC LUPUS ERYTHEMATOSUS WITH OTHER ORGAN INVOLVEMENT: Chronic | ICD-10-CM

## 2018-10-04 DIAGNOSIS — N18.4 ANEMIA IN STAGE 4 CHRONIC KIDNEY DISEASE: ICD-10-CM

## 2018-10-04 DIAGNOSIS — E46 MALNUTRITION: ICD-10-CM

## 2018-10-04 DIAGNOSIS — E87.20 METABOLIC ACIDOSIS, NORMAL ANION GAP (NAG): ICD-10-CM

## 2018-10-04 DIAGNOSIS — I51.89 LEFT VENTRICULAR DIASTOLIC DYSFUNCTION WITH PRESERVED SYSTOLIC FUNCTION: ICD-10-CM

## 2018-10-04 DIAGNOSIS — I25.84 CORONARY ARTERY DISEASE DUE TO CALCIFIED CORONARY LESION: ICD-10-CM

## 2018-10-04 DIAGNOSIS — F32.5 MAJOR DEPRESSIVE DISORDER WITH SINGLE EPISODE, IN FULL REMISSION: ICD-10-CM

## 2018-10-04 DIAGNOSIS — R60.9 EDEMA: ICD-10-CM

## 2018-10-04 DIAGNOSIS — D63.1 ANEMIA IN STAGE 4 CHRONIC KIDNEY DISEASE: ICD-10-CM

## 2018-10-04 DIAGNOSIS — E55.9 VITAMIN D DEFICIENCY: ICD-10-CM

## 2018-10-04 DIAGNOSIS — N18.4 CKD (CHRONIC KIDNEY DISEASE), STAGE IV: ICD-10-CM

## 2018-10-04 DIAGNOSIS — E78.5 DYSLIPIDEMIA: ICD-10-CM

## 2018-10-04 DIAGNOSIS — E03.9 ACQUIRED HYPOTHYROIDISM: ICD-10-CM

## 2018-10-04 DIAGNOSIS — I1A.0 RESISTANT HYPERTENSION: ICD-10-CM

## 2018-10-04 LAB
ALBUMIN SERPL BCP-MCNC: 2.1 G/DL
ANION GAP SERPL CALC-SCNC: 13 MMOL/L
BASOPHILS # BLD AUTO: 0.02 K/UL
BASOPHILS NFR BLD: 0.3 %
BUN SERPL-MCNC: 120 MG/DL
CALCIUM SERPL-MCNC: 8.5 MG/DL
CHLORIDE SERPL-SCNC: 100 MMOL/L
CO2 SERPL-SCNC: 25 MMOL/L
CREAT SERPL-MCNC: 4.4 MG/DL
DIFFERENTIAL METHOD: ABNORMAL
EOSINOPHIL # BLD AUTO: 0.1 K/UL
EOSINOPHIL NFR BLD: 1 %
ERYTHROCYTE [DISTWIDTH] IN BLOOD BY AUTOMATED COUNT: 13.4 %
EST. GFR  (AFRICAN AMERICAN): 10.8 ML/MIN/1.73 M^2
EST. GFR  (NON AFRICAN AMERICAN): 9.4 ML/MIN/1.73 M^2
GLUCOSE SERPL-MCNC: 79 MG/DL
HCT VFR BLD AUTO: 28.1 %
HGB BLD-MCNC: 8.8 G/DL
IMM GRANULOCYTES # BLD AUTO: 0.02 K/UL
IMM GRANULOCYTES NFR BLD AUTO: 0.3 %
LYMPHOCYTES # BLD AUTO: 1.2 K/UL
LYMPHOCYTES NFR BLD: 21 %
MAGNESIUM SERPL-MCNC: 1.8 MG/DL
MCH RBC QN AUTO: 26.4 PG
MCHC RBC AUTO-ENTMCNC: 31.3 G/DL
MCV RBC AUTO: 84 FL
MONOCYTES # BLD AUTO: 0.9 K/UL
MONOCYTES NFR BLD: 15.9 %
NEUTROPHILS # BLD AUTO: 3.6 K/UL
NEUTROPHILS NFR BLD: 61.5 %
NRBC BLD-RTO: 0 /100 WBC
PHOSPHATE SERPL-MCNC: 4.1 MG/DL
PLATELET # BLD AUTO: 446 K/UL
PMV BLD AUTO: 9.5 FL
POTASSIUM SERPL-SCNC: 3.5 MMOL/L
RBC # BLD AUTO: 3.33 M/UL
SODIUM SERPL-SCNC: 138 MMOL/L
WBC # BLD AUTO: 5.91 K/UL

## 2018-10-04 PROCEDURE — 11000004 HC SNF PRIVATE

## 2018-10-04 PROCEDURE — 85025 COMPLETE CBC W/AUTO DIFF WBC: CPT

## 2018-10-04 PROCEDURE — 97530 THERAPEUTIC ACTIVITIES: CPT

## 2018-10-04 PROCEDURE — 97116 GAIT TRAINING THERAPY: CPT

## 2018-10-04 PROCEDURE — 63600175 PHARM REV CODE 636 W HCPCS: Performed by: HOSPITALIST

## 2018-10-04 PROCEDURE — 80069 RENAL FUNCTION PANEL: CPT

## 2018-10-04 PROCEDURE — 25000003 PHARM REV CODE 250: Performed by: HOSPITALIST

## 2018-10-04 PROCEDURE — 99238 HOSP IP/OBS DSCHRG MGMT 30/<: CPT | Mod: ,,, | Performed by: HOSPITALIST

## 2018-10-04 PROCEDURE — 83735 ASSAY OF MAGNESIUM: CPT

## 2018-10-04 PROCEDURE — A4216 STERILE WATER/SALINE, 10 ML: HCPCS | Performed by: HOSPITALIST

## 2018-10-04 PROCEDURE — 36415 COLL VENOUS BLD VENIPUNCTURE: CPT

## 2018-10-04 PROCEDURE — 97535 SELF CARE MNGMENT TRAINING: CPT

## 2018-10-04 RX ORDER — DOXAZOSIN 1 MG/1
1 TABLET ORAL NIGHTLY
Status: DISCONTINUED | OUTPATIENT
Start: 2018-10-04 | End: 2018-10-09

## 2018-10-04 RX ORDER — LEVOTHYROXINE SODIUM 75 UG/1
75 TABLET ORAL
Status: DISCONTINUED | OUTPATIENT
Start: 2018-10-05 | End: 2018-10-12 | Stop reason: HOSPADM

## 2018-10-04 RX ORDER — ISOSORBIDE MONONITRATE 30 MG/1
60 TABLET, EXTENDED RELEASE ORAL DAILY
Status: DISCONTINUED | OUTPATIENT
Start: 2018-10-05 | End: 2018-10-12 | Stop reason: HOSPADM

## 2018-10-04 RX ORDER — ACETAMINOPHEN 325 MG/1
650 TABLET ORAL EVERY 6 HOURS PRN
Status: CANCELLED | OUTPATIENT
Start: 2018-10-04

## 2018-10-04 RX ORDER — CALCIUM CARBONATE 200(500)MG
500 TABLET,CHEWABLE ORAL 2 TIMES DAILY PRN
Status: CANCELLED | OUTPATIENT
Start: 2018-10-04

## 2018-10-04 RX ORDER — ATORVASTATIN CALCIUM 20 MG/1
80 TABLET, FILM COATED ORAL DAILY
Status: CANCELLED | OUTPATIENT
Start: 2018-10-05

## 2018-10-04 RX ORDER — PANTOPRAZOLE SODIUM 40 MG/1
40 TABLET, DELAYED RELEASE ORAL DAILY
Status: DISCONTINUED | OUTPATIENT
Start: 2018-10-05 | End: 2018-10-12 | Stop reason: HOSPADM

## 2018-10-04 RX ORDER — LATANOPROST 50 UG/ML
1 SOLUTION/ DROPS OPHTHALMIC DAILY
Status: DISCONTINUED | OUTPATIENT
Start: 2018-10-05 | End: 2018-10-12 | Stop reason: HOSPADM

## 2018-10-04 RX ORDER — NIFEDIPINE 30 MG/1
60 TABLET, EXTENDED RELEASE ORAL 2 TIMES DAILY
Status: DISCONTINUED | OUTPATIENT
Start: 2018-10-04 | End: 2018-10-12 | Stop reason: HOSPADM

## 2018-10-04 RX ORDER — ASPIRIN 81 MG/1
81 TABLET ORAL DAILY
Status: CANCELLED | OUTPATIENT
Start: 2018-10-04

## 2018-10-04 RX ORDER — FUROSEMIDE 40 MG/1
40 TABLET ORAL DAILY PRN
Status: DISCONTINUED | OUTPATIENT
Start: 2018-10-04 | End: 2018-10-12 | Stop reason: HOSPADM

## 2018-10-04 RX ORDER — SODIUM CHLORIDE 0.9 % (FLUSH) 0.9 %
3 SYRINGE (ML) INJECTION EVERY 8 HOURS
Status: CANCELLED | OUTPATIENT
Start: 2018-10-04

## 2018-10-04 RX ORDER — PANTOPRAZOLE SODIUM 40 MG/1
40 TABLET, DELAYED RELEASE ORAL DAILY
Status: CANCELLED | OUTPATIENT
Start: 2018-10-05

## 2018-10-04 RX ORDER — SODIUM CHLORIDE 0.9 % (FLUSH) 0.9 %
3 SYRINGE (ML) INJECTION EVERY 8 HOURS
Status: DISCONTINUED | OUTPATIENT
Start: 2018-10-04 | End: 2018-10-05

## 2018-10-04 RX ORDER — AMOXICILLIN 250 MG
1 CAPSULE ORAL 2 TIMES DAILY PRN
Status: CANCELLED | OUTPATIENT
Start: 2018-10-04

## 2018-10-04 RX ORDER — BACITRACIN 500 [USP'U]/G
OINTMENT TOPICAL
Status: CANCELLED | OUTPATIENT
Start: 2018-10-04

## 2018-10-04 RX ORDER — FUROSEMIDE 40 MG/1
40 TABLET ORAL DAILY PRN
Status: CANCELLED | OUTPATIENT
Start: 2018-10-04

## 2018-10-04 RX ORDER — AMOXICILLIN 250 MG
1 CAPSULE ORAL 2 TIMES DAILY
Status: CANCELLED | OUTPATIENT
Start: 2018-10-04

## 2018-10-04 RX ORDER — ACETAMINOPHEN 325 MG/1
650 TABLET ORAL EVERY 4 HOURS PRN
Status: CANCELLED | OUTPATIENT
Start: 2018-10-04

## 2018-10-04 RX ORDER — DOXAZOSIN 1 MG/1
1 TABLET ORAL NIGHTLY
Status: CANCELLED | OUTPATIENT
Start: 2018-10-04

## 2018-10-04 RX ORDER — SODIUM BICARBONATE 650 MG/1
650 TABLET ORAL 3 TIMES DAILY
Status: DISCONTINUED | OUTPATIENT
Start: 2018-10-04 | End: 2018-10-08

## 2018-10-04 RX ORDER — SODIUM BICARBONATE 650 MG/1
650 TABLET ORAL 3 TIMES DAILY
Qty: 90 TABLET | Refills: 11 | Status: ON HOLD | COMMUNITY
Start: 2018-10-04 | End: 2018-10-11 | Stop reason: HOSPADM

## 2018-10-04 RX ORDER — BACITRACIN 500 [USP'U]/G
OINTMENT TOPICAL
Status: DISCONTINUED | OUTPATIENT
Start: 2018-10-04 | End: 2018-10-12 | Stop reason: HOSPADM

## 2018-10-04 RX ORDER — CARVEDILOL 6.25 MG/1
6.25 TABLET ORAL 2 TIMES DAILY
Status: CANCELLED | OUTPATIENT
Start: 2018-10-04

## 2018-10-04 RX ORDER — ACETAMINOPHEN 325 MG/1
650 TABLET ORAL EVERY 4 HOURS PRN
Status: DISCONTINUED | OUTPATIENT
Start: 2018-10-04 | End: 2018-10-12 | Stop reason: HOSPADM

## 2018-10-04 RX ORDER — ERGOCALCIFEROL 1.25 MG/1
50000 CAPSULE ORAL
Status: CANCELLED | OUTPATIENT
Start: 2018-10-06

## 2018-10-04 RX ORDER — HEPARIN SODIUM 5000 [USP'U]/ML
5000 INJECTION, SOLUTION INTRAVENOUS; SUBCUTANEOUS EVERY 8 HOURS
Status: CANCELLED | OUTPATIENT
Start: 2018-10-04

## 2018-10-04 RX ORDER — AMOXICILLIN 250 MG
1 CAPSULE ORAL 2 TIMES DAILY PRN
Status: DISCONTINUED | OUTPATIENT
Start: 2018-10-04 | End: 2018-10-05

## 2018-10-04 RX ORDER — CALCIUM CARBONATE 200(500)MG
500 TABLET,CHEWABLE ORAL 2 TIMES DAILY PRN
Status: DISCONTINUED | OUTPATIENT
Start: 2018-10-04 | End: 2018-10-12 | Stop reason: HOSPADM

## 2018-10-04 RX ORDER — ONDANSETRON 4 MG/1
4 TABLET, ORALLY DISINTEGRATING ORAL EVERY 8 HOURS PRN
Status: DISCONTINUED | OUTPATIENT
Start: 2018-10-04 | End: 2018-10-12 | Stop reason: HOSPADM

## 2018-10-04 RX ORDER — ONDANSETRON 4 MG/1
4 TABLET, ORALLY DISINTEGRATING ORAL EVERY 8 HOURS PRN
Status: CANCELLED | OUTPATIENT
Start: 2018-10-04

## 2018-10-04 RX ORDER — LEVOTHYROXINE SODIUM 75 UG/1
75 TABLET ORAL
Status: CANCELLED | OUTPATIENT
Start: 2018-10-05

## 2018-10-04 RX ORDER — RAMELTEON 8 MG/1
8 TABLET ORAL NIGHTLY PRN
Status: CANCELLED | OUTPATIENT
Start: 2018-10-04

## 2018-10-04 RX ORDER — HEPARIN SODIUM 5000 [USP'U]/ML
5000 INJECTION, SOLUTION INTRAVENOUS; SUBCUTANEOUS EVERY 8 HOURS
Status: DISCONTINUED | OUTPATIENT
Start: 2018-10-04 | End: 2018-10-12 | Stop reason: HOSPADM

## 2018-10-04 RX ORDER — HYDROXYCHLOROQUINE SULFATE 200 MG/1
200 TABLET, FILM COATED ORAL 2 TIMES DAILY
Status: CANCELLED | OUTPATIENT
Start: 2018-10-04

## 2018-10-04 RX ORDER — ATORVASTATIN CALCIUM 20 MG/1
80 TABLET, FILM COATED ORAL DAILY
Status: DISCONTINUED | OUTPATIENT
Start: 2018-10-05 | End: 2018-10-12 | Stop reason: HOSPADM

## 2018-10-04 RX ORDER — HYDROXYCHLOROQUINE SULFATE 200 MG/1
200 TABLET, FILM COATED ORAL 2 TIMES DAILY
Status: DISCONTINUED | OUTPATIENT
Start: 2018-10-04 | End: 2018-10-12 | Stop reason: HOSPADM

## 2018-10-04 RX ORDER — NIFEDIPINE 60 MG/1
60 TABLET, EXTENDED RELEASE ORAL 2 TIMES DAILY
Status: CANCELLED | OUTPATIENT
Start: 2018-10-04

## 2018-10-04 RX ORDER — SODIUM BICARBONATE 325 MG/1
650 TABLET ORAL 3 TIMES DAILY
Status: CANCELLED | OUTPATIENT
Start: 2018-10-04

## 2018-10-04 RX ORDER — ERGOCALCIFEROL 1.25 MG/1
50000 CAPSULE ORAL
Status: DISCONTINUED | OUTPATIENT
Start: 2018-10-06 | End: 2018-10-12 | Stop reason: HOSPADM

## 2018-10-04 RX ORDER — CARVEDILOL 6.25 MG/1
6.25 TABLET ORAL 2 TIMES DAILY
Status: DISCONTINUED | OUTPATIENT
Start: 2018-10-04 | End: 2018-10-12 | Stop reason: HOSPADM

## 2018-10-04 RX ORDER — RAMELTEON 8 MG/1
8 TABLET ORAL NIGHTLY PRN
Status: DISCONTINUED | OUTPATIENT
Start: 2018-10-04 | End: 2018-10-12 | Stop reason: HOSPADM

## 2018-10-04 RX ORDER — AMOXICILLIN 250 MG
1 CAPSULE ORAL 2 TIMES DAILY
Status: DISCONTINUED | OUTPATIENT
Start: 2018-10-04 | End: 2018-10-12 | Stop reason: HOSPADM

## 2018-10-04 RX ORDER — CARVEDILOL 6.25 MG/1
6.25 TABLET ORAL 2 TIMES DAILY
Qty: 60 TABLET | Refills: 11
Start: 2018-10-04 | End: 2018-11-09 | Stop reason: DRUGHIGH

## 2018-10-04 RX ORDER — ASPIRIN 81 MG/1
81 TABLET ORAL DAILY
Status: DISCONTINUED | OUTPATIENT
Start: 2018-10-05 | End: 2018-10-12 | Stop reason: HOSPADM

## 2018-10-04 RX ORDER — ISOSORBIDE MONONITRATE 30 MG/1
60 TABLET, EXTENDED RELEASE ORAL DAILY
Status: CANCELLED | OUTPATIENT
Start: 2018-10-05

## 2018-10-04 RX ORDER — LATANOPROST 50 UG/ML
1 SOLUTION/ DROPS OPHTHALMIC DAILY
Status: CANCELLED | OUTPATIENT
Start: 2018-10-05

## 2018-10-04 RX ADMIN — NIFEDIPINE 60 MG: 60 TABLET, FILM COATED, EXTENDED RELEASE ORAL at 10:10

## 2018-10-04 RX ADMIN — PANTOPRAZOLE SODIUM 40 MG: 40 TABLET, DELAYED RELEASE ORAL at 05:10

## 2018-10-04 RX ADMIN — ISOSORBIDE MONONITRATE 60 MG: 30 TABLET, EXTENDED RELEASE ORAL at 10:10

## 2018-10-04 RX ADMIN — Medication 3 ML: at 03:10

## 2018-10-04 RX ADMIN — ATORVASTATIN CALCIUM 80 MG: 20 TABLET, FILM COATED ORAL at 10:10

## 2018-10-04 RX ADMIN — HYDROXYCHLOROQUINE SULFATE 200 MG: 200 TABLET, FILM COATED ORAL at 09:10

## 2018-10-04 RX ADMIN — SODIUM BICARBONATE 650 MG: 325 TABLET ORAL at 10:10

## 2018-10-04 RX ADMIN — DOXAZOSIN 1 MG: 1 TABLET ORAL at 09:10

## 2018-10-04 RX ADMIN — Medication 3 ML: at 05:10

## 2018-10-04 RX ADMIN — CARVEDILOL 6.25 MG: 6.25 TABLET, FILM COATED ORAL at 10:10

## 2018-10-04 RX ADMIN — LEVOTHYROXINE SODIUM 75 MCG: 75 TABLET ORAL at 05:10

## 2018-10-04 RX ADMIN — CARVEDILOL 6.25 MG: 6.25 TABLET, FILM COATED ORAL at 09:10

## 2018-10-04 RX ADMIN — HEPARIN SODIUM 5000 UNITS: 5000 INJECTION, SOLUTION INTRAVENOUS; SUBCUTANEOUS at 05:10

## 2018-10-04 RX ADMIN — SODIUM BICARBONATE 650 MG: 325 TABLET ORAL at 03:10

## 2018-10-04 RX ADMIN — HYDROXYCHLOROQUINE SULFATE 200 MG: 200 TABLET, FILM COATED ORAL at 10:10

## 2018-10-04 RX ADMIN — PANTOPRAZOLE SODIUM 40 MG: 40 TABLET, DELAYED RELEASE ORAL at 04:10

## 2018-10-04 RX ADMIN — LATANOPROST 1 DROP: 50 SOLUTION OPHTHALMIC at 10:10

## 2018-10-04 RX ADMIN — SODIUM BICARBONATE 650 MG TABLET 650 MG: at 09:10

## 2018-10-04 RX ADMIN — HEPARIN SODIUM 5000 UNITS: 5000 INJECTION, SOLUTION INTRAVENOUS; SUBCUTANEOUS at 03:10

## 2018-10-04 RX ADMIN — NIFEDIPINE 60 MG: 30 TABLET, FILM COATED, EXTENDED RELEASE ORAL at 09:10

## 2018-10-04 NOTE — PT/OT/SLP PROGRESS
Occupational Therapy   Treatment    Name: Ewelina Arnold  MRN: [id number]  Admitting Diagnosis:  MARYAM (acute kidney injury)       Recommendations:     Discharge Recommendations: nursing facility, skilled  Discharge Equipment Recommendations:  none  Barriers to discharge:  Decreased caregiver support    Subjective     Communicated with: RN prior to session.  Pain/Comfort:  · Pain Rating 1: 0/10    Patients cultural, spiritual, Shinto conflicts given the current situation: none stated     Objective:     Patient found with: peripheral IV, telemetry    General Precautions: Standard, fall   Orthopedic Precautions:N/A   Braces: N/A     Occupational Performance:    Bed Mobility:    · Pt presented in chair.      Functional Mobility/Transfers:  · Functional Mobility: Pt with min +A for toilet transfer    Activities of Daily Living:  · Toileting with min A for safe sitting  · Mod A for hygiene   · - mod A for lower extremity dressing     Patient left up in chair with all lines intact    AMPA 6 Click:  AMPA Total Score: 19    Treatment & Education:  Pt educated on safety, role of OT, importance of increased participation in self care for gains , expectations for participation, expectations for gains, POC, energy conservation, caregiver strain. White board updated.   - self care training with toileting for safety   - toielt hygiene wit mod A with training for completion    Education:    Assessment:     Ewelina Arnold is a 72 y.o. female with a medical diagnosis of MARYAM (acute kidney injury).  She presents up in chair and with good motivation fr gains and prepared for DC to SNF for continued gains .  Performance deficits affecting function are impaired endurance, impaired self care skills, impaired functional mobilty.      Rehab Prognosis:  good; patient would benefit from acute skilled OT services to address these deficits and reach maximum level of function.       Plan:     Patient to be seen 3 x/week to address the above listed  problems via self-care/home management, therapeutic activities, therapeutic exercises  · Plan of Care Expires: 11/01/18  · Plan of Care Reviewed with: patient    This Plan of care has been discussed with the patient who was involved in its development and understands and is in agreement with the identified goals and treatment plan    GOALS:   Multidisciplinary Problems     Occupational Therapy Goals        Problem: Occupational Therapy Goal    Goal Priority Disciplines Outcome Interventions   Occupational Therapy Goal     OT, PT/OT Ongoing (interventions implemented as appropriate)    Description:  Goals to be met by: 10/15    Patient will increase functional independence with ADLs by performing:    UE Dressing with [place].  LE Dressing with Contact Guard Assistance.  Grooming while seated with Set-up Assistance.  Toileting from toilet with Contact Guard Assistance for hygiene and clothing management.                       Time Tracking:     OT Date of Treatment: 10/04/18  OT Start Time: 1240  OT Stop Time: 1305  OT Total Time (min): 25 min    Billable Minutes:Self Care/Home Management 25    Janeth Ricci, OT  10/4/2018

## 2018-10-04 NOTE — DISCHARGE SUMMARY
"[facility] Hospital Medicine  Discharge Summary      Patient Name: Ewelina Arnold  MRN: [id number]  Admission Date: 9/28/2018  Hospital Length of Stay: 6 days  Discharge Date and Time: No discharge date for patient encounter.  Attending Physician: Kaye Urias MD   Discharging Provider: Kaye Urias MD  Primary Care Provider: Amarilys Johns MD  [facility] Medicine Team: [facility] HOSP MED  Kaye Urias MD    HPI:   Ms. Ewelina Arnold is a 72 y.o. 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 Patient was asked to stop her lasix 40mg BID approx 1 week ago due to worsening renal function. Since then, pt has experienced abdominal distension, SOB/ PEDRO while talking to the bathroom, as well as decreased urine output. Associated with mild cough, with no hemoptysis or fevers. In addition, patient underwent a treatment course with prednisone for SLE flair approx 3 weeks ago. No LE edema, CP, palpitations, lightheadedness, n/v.      In cardiology clinic, pt was found to have fluid overload with inspiratory crackles at lung bases and + JVD. Labs notable for Cr of 5.1 from 3.8-3.4 baseline, BNP of 3718 from 3614-5977 prior, and pulm edema on CXR. Weight is 114 which he stated is her "dry" weight. However, abd distension and SOB with minimal exertion prompted pt to be evaluated by her PCP and then Cardiology. Cardiology does not believe that aortic valve replacement for her mild stenosis would benefit the pt        * No surgery found *      Hospital Course:   Acute on chronic diastolic congestive heart failure  - BNP of 3718 from 6150-9153 prior, and pulm edema on CXR.   - volume retention is likely a component of worsening CKD and / or cardiorenal and/or recent " prednisone use and /or recent lasix cessation   - received lasix 100mg IV push on admission, started on lasix gtt at 10 mg/hr, discontinued 10/1  - transitioned from lasix gtt to bumex 1 mg bid and metolazone increased to 2.5 mg bid on 10/1. Diuresis discontinued due to patient feeling weak and lightheaded and concern for dehydration  - repeat CXR 10/1 w/ edema, unchanged  - continue daily weights   - cont HF/ HTN meds  - repeat 2D echo similar to previous, EF 60-65%, G2DD  - continue home Lasix 40 mg daily prn for weight gain 2-3 lbs in one day, or 4-6 lbs in a week - dry weight is 107 lb  - hold home metolazone for concerns for dehdyration  - has upcoming f/u with Dr Gennaro FRANCISCO (acute kidney injury) on CKD (chronic kidney disease), stage IV  Solitary kidney, acquired  Metabolic acidosis, normal anion gap (NAG)   - labs notable for peak Cr of 5.6 from 3-3.8 baseline, improving 4.4 at time of discharge  - likely cardiorenal vs progression of CKD  - improved  further after discontinuation of diuresis  - started on sodium bicarb 650 TID  - nephrology consulted - Low suspicion for lupus flare (no proteinuria or hematuria, negative DsDNA this year). Will likely eventually need long-term dialysis. Plan to see her in their clinic  - amb ref to nephro placed     Nonrheumatic aortic valve stenosis  - 8/2018 echo with Moderate aortic stenosis, PABLITO = 1.12 cm2, AVAi = 0.73 cm2/m2, peak velocity = 4.08 m/s, mean gradient = 33 mmHg. DI = 0.29, increased gradients appear to be related to concomitant Aortic regurgitation  - cardiology does not believe pt would benefit from AVR  - cardiology f/u     Anemia in stage 4-5 chronic kidney disease  - hbg of 8-9, which is her baseline  - anemia studies indicative of chronic disease, 2/2 renal failure  - amb ref to GI for FOBT+ in setting of stable anemia and no melena/hematochezia     Renovascular hypertension   - decreased coreg 25mg BID to coreg 6.25 bid given HR of 50s  - cont home  doxazosin   - cont home isosorbide mononitrate 60mh  - cont home nifedipine 60mg BID  - BP's controlled on above regimen     Hypovitaminosis D  - vit D low - ergocalciferol supplementation     GERD   - Cont PPI     Acquired hypothyroidism  - cont home synthroid     Coronary artery disease due to calcified coronary lesion  - Cont home ASA 81 and statin, BB     Systemic lupus erythematosus  - No active lupus symptoms. Cont home hydroxychloroquine 200mg bid         Consults:   Consults (From admission, onward)        Status Ordering Provider     Inpatient consult to Cardiology  Once     Provider:  (Not yet assigned)    Completed ELZA MOONEY     Inpatient consult to Nephrology  Once     Provider:  (Not yet assigned)    Completed JAYLEEN OCHOA     Inpatient consult to [facility]  Once     Provider:  (Not yet assigned)    Acknowledged ELZA MOONEY     IP consult to case management  Once     Provider:  (Not yet assigned)    Acknowledged NA HICKS          No new Assessment & Plan notes have been filed under this hospital service since the last note was generated.  Service: Hospital Medicine    Final Active Diagnoses:    Diagnosis Date Noted POA    PRINCIPAL PROBLEM:  MARYAM (acute kidney injury) [N17.9] 04/03/2018 Yes    Acute heart failure with normal ejection fraction [I50.31]  Yes    Vitamin D deficiency [E55.9] 09/29/2018 Yes    Hyponatremia [E87.1] 09/28/2018 Yes    Hypovitaminosis D [E55.9] 06/28/2018 Yes    Acute on chronic diastolic congestive heart failure [I50.33] 04/28/2018 Yes    Pulmonary edema cardiac cause [I50.1] 04/12/2018 Yes    Renovascular hypertension [I15.0] 04/04/2018 Yes    Metabolic acidosis, normal anion gap (NAG) [E87.2] 04/03/2018 Yes    Solitary kidney, acquired [Z90.5] 11/30/2017 Not Applicable    Systolic congestive heart failure [I50.20] 11/27/2017 Yes    Nonrheumatic aortic valve stenosis [I35.0] 11/18/2017 Yes    Anemia in stage 4 chronic kidney disease [N18.4,  D63.1] 07/25/2017 Yes    CKD (chronic kidney disease), stage IV [N18.4] 01/23/2017 Yes    GERD (gastroesophageal reflux disease) [K21.9]  Yes    Acquired hypothyroidism [E03.9] 05/13/2015 Yes    Coronary artery disease due to calcified coronary lesion [I25.10, I25.84] 10/05/2012 Yes    Other forms of systemic lupus erythematosus [M32.8] 08/17/2012 Yes     Chronic      Problems Resolved During this Admission:       Discharged Condition: stable    Disposition: Skilled Nursing Facility    Follow Up:    Patient Instructions:      Ambulatory Referral to Gastroenterology   Referral Priority: Routine Referral Type: Consultation   Referral Reason: Specialty Services Required   Requested Specialty: Gastroenterology   Number of Visits Requested: 1     Ambulatory Referral to Nephrology   Referral Priority: Routine Referral Type: Consultation   Referral Reason: Specialty Services Required   Requested Specialty: Nephrology   Number of Visits Requested: 1       Significant Diagnostic Studies: Labs:   CMP   Recent Labs   Lab  10/03/18   0410  10/04/18   0349   NA  140  138   K  3.5  3.5   CL  100  100   CO2  27  25   GLU  55*  79   BUN  119*  120*   CREATININE  4.5*  4.4*   CALCIUM  8.6*  8.5*   ALBUMIN  2.1*  2.1*   ANIONGAP  13  13   ESTGFRAFRICA  10.6*  10.8*   EGFRNONAA  9.2*  9.4*    and CBC   Recent Labs   Lab  10/04/18   0349   WBC  5.91   HGB  8.8*   HCT  28.1*   PLT  446*       Pending Diagnostic Studies:     None         Medications:  Reconciled Home Medications:      Medication List      START taking these medications    sodium bicarbonate 650 MG tablet  Take 1 tablet (650 mg total) by mouth 3 (three) times daily.        CHANGE how you take these medications    carvedilol 6.25 MG tablet  Commonly known as:  COREG  Take 1 tablet (6.25 mg total) by mouth 2 (two) times daily.  What changed:    · medication strength  · how much to take     furosemide 40 MG tablet  Commonly known as:  LASIX  Take 1 tablet (40 mg total)  by mouth daily as needed (for weight gain of 2-3 lbs in one day or 4-6 lbs in a week).  What changed:  Another medication with the same name was removed. Continue taking this medication, and follow the directions you see here.        CONTINUE taking these medications    aspirin 81 MG EC tablet  Commonly known as:  ECOTRIN  Take 1 tablet (81 mg total) by mouth once daily.     butalbital-aspirin-caffeine -40 mg -40 mg Cap  Commonly known as:  FIORINAL  Take 1 capsule by mouth.     calcipotriene 0.005 % sclp soln  Apply 1 application topically 2 (two) times daily.     citalopram 20 MG tablet  Commonly known as:  CELEXA  TAKE 1 AND 1/2  TABLETS (30 MG TOTAL) ONCE DAILY.     clobetasol 0.05 % external solution  Commonly known as:  TEMOVATE  Apply topically 2 (two) times daily.     dicyclomine 10 MG capsule  Commonly known as:  BENTYL  Take 10 mg by mouth.     doxazosin 1 MG tablet  Commonly known as:  CARDURA  Take 1 tablet (1 mg total) by mouth every evening.     ergocalciferol 50,000 unit Cap  Commonly known as:  VITAMIN D2  Take 1 capsule (50,000 Units total) by mouth every 7 days.     famotidine 20 MG tablet  Commonly known as:  PEPCID  Take 1 tablet (20 mg total) by mouth once daily.     hydroxychloroquine 200 mg tablet  Commonly known as:  PLAQUENIL  TAKE 1 TABLET TWICE DAILY     isosorbide mononitrate 60 MG 24 hr tablet  Commonly known as:  IMDUR  Take 1 tablet (60 mg total) by mouth once daily.     latanoprost 0.005 % ophthalmic solution  INSTILL 1 DROP INTO BOTH EYES EVERY DAY     levothyroxine 75 MCG tablet  Commonly known as:  SYNTHROID  Take 1 tablet (75 mcg total) by mouth before breakfast.     mometasone 0.1 % ointment  Commonly known as:  ELOCON  Apply topically once daily. To rashes for 14 days     NIFEdipine 60 MG (OSM) 24 hr tablet  Commonly known as:  PROCARDIA-XL  Take 1 tablet (60 mg total) by mouth 2 (two) times daily.     rosuvastatin 10 MG tablet  Commonly known as:  CRESTOR  Take 1  tablet (10 mg total) by mouth every evening.        STOP taking these medications    metOLazone 2.5 MG tablet  Commonly known as:  ZAROXOLYN               Indwelling Lines/Drains at time of discharge:   Lines/Drains/Airways          None          Time spent on the discharge of patient: 29 minutes  Patient was seen and examined on the date of discharge and determined to be suitable for discharge.         Kaye Urias MD  Department of Hospital Medicine  [facility]

## 2018-10-04 NOTE — HOSPITAL COURSE
Acute on chronic diastolic congestive heart failure  - BNP of 3718 from 6970-6453 prior, and pulm edema on CXR.   - volume retention is likely a component of worsening CKD and / or cardiorenal and/or recent prednisone use and /or recent lasix cessation   - received lasix 100mg IV push on admission, started on lasix gtt at 10 mg/hr, discontinued 10/1  - transitioned from lasix gtt to bumex 1 mg bid and metolazone increased to 2.5 mg bid on 10/1. Diuresis discontinued due to patient feeling weak and lightheaded and concern for dehydration  - repeat CXR 10/1 w/ edema, unchanged  - continue daily weights   - cont HF/ HTN meds  - repeat 2D echo similar to previous, EF 60-65%, G2DD  - continue home Lasix 40 mg daily prn for weight gain 2-3 lbs in one day, or 4-6 lbs in a week - dry weight is 107 lb  - hold home metolazone for concerns for dehdyration  - has upcoming f/u with Dr Gennaro FRANCISCO (acute kidney injury) on CKD (chronic kidney disease), stage IV  Solitary kidney, acquired  Metabolic acidosis, normal anion gap (NAG)   - labs notable for peak Cr of 5.6 from 3-3.8 baseline, improving 4.4 at time of discharge  - likely cardiorenal vs progression of CKD  - improved further after discontinuation of diuresis  - started on sodium bicarb 650 TID  - nephrology consulted - Low suspicion for lupus flare (no proteinuria or hematuria, negative DsDNA this year). Will likely eventually need long-term dialysis. Plan to see her in their clinic  - amb ref to nephro placed     Nonrheumatic aortic valve stenosis  - 8/2018 echo with Moderate aortic stenosis, PABLITO = 1.12 cm2, AVAi = 0.73 cm2/m2, peak velocity = 4.08 m/s, mean gradient = 33 mmHg. DI = 0.29, increased gradients appear to be related to concomitant Aortic regurgitation  - cardiology does not believe pt would benefit from AVR  - cardiology f/u     Anemia in stage 4-5 chronic kidney disease  - hbg of 8-9, which is her baseline  - anemia studies indicative of chronic disease,  2/2 renal failure  - amb ref to GI for FOBT+ in setting of stable anemia and no melena/hematochezia     Renovascular hypertension   - decreased coreg 25mg BID to coreg 6.25 bid given HR of 50s  - cont home doxazosin   - cont home isosorbide mononitrate 60mh  - cont home nifedipine 60mg BID  - BP's controlled on above regimen     Hypovitaminosis D  - vit D low - ergocalciferol supplementation     GERD   - Cont PPI     Acquired hypothyroidism  - cont home synthroid     Coronary artery disease due to calcified coronary lesion  - Cont home ASA 81 and statin, BB     Systemic lupus erythematosus  - No active lupus symptoms. Cont home hydroxychloroquine 200mg bid

## 2018-10-04 NOTE — PLAN OF CARE
Problem: Physical Therapy Goal  Goal: Physical Therapy Goal  Goals to be met by: 10 days (10/10)    Patient will increase functional independence with mobility by performin. Supine to sit with Set-up [place]  2. Sit to supine with Set-up [place]  3. Sit to stand transfer with Supervision  4. Gait  x 200 feet with Supervision using no assistive device  5. Lower extremity exercise program x30 reps per handout, with independence to improve muscular strength and endurance.   6. Pt will ambulate 6 steps with (B) HR and SBA.        Outcome: Ongoing (interventions implemented as appropriate)  Goals remain appropriate.

## 2018-10-04 NOTE — PLAN OF CARE
Problem: Occupational Therapy Goal  Goal: Occupational Therapy Goal  Goals to be met by: 10/15    Patient will increase functional independence with ADLs by performing:    UE Dressing with [place].  LE Dressing with Contact Guard Assistance.  Grooming while seated with Set-up Assistance.  Toileting from toilet with Contact Guard Assistance for hygiene and clothing management.      Outcome: Ongoing (interventions implemented as appropriate)  Goals addressed and unmet.  Cont with POC  Janeth Ricci OT  10/4/2018

## 2018-10-04 NOTE — HPI
"Ms. Ewelina Arnold is a 72 y.o. 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 Patient was asked to stop her lasix 40mg BID approx 1 week ago due to worsening renal function. Since then, pt has experienced abdominal distension, SOB/ PEDRO while talking to the bathroom, as well as decreased urine output. Associated with mild cough, with no hemoptysis or fevers. In addition, patient underwent a treatment course with prednisone for SLE flair approx 3 weeks ago. No LE edema, CP, palpitations, lightheadedness, n/v.      In cardiology clinic, pt was found to have fluid overload with inspiratory crackles at lung bases and + JVD. Labs notable for Cr of 5.1 from 3.8-3.4 baseline, BNP of 3718 from 0682-8563 prior, and pulm edema on CXR. Weight is 114 which he stated is her "dry" weight. However, abd distension and SOB with minimal exertion prompted pt to be evaluated by her PCP and then Cardiology. Cardiology does not believe that aortic valve replacement for her mild stenosis would benefit the pt      "

## 2018-10-04 NOTE — PLAN OF CARE
Pt has been accepted to [facility] skilled unit and can be transferred today.  Wheelchair van arranged with [facility] to  pt for 3:00 today.

## 2018-10-04 NOTE — PT/OT/SLP PROGRESS
"Physical Therapy Treatment    Patient Name:  Ewelina Arnold   MRN:  [id number]    Recommendations:     Discharge Recommendations:  nursing facility, skilled   Discharge Equipment Recommendations: none   Barriers to discharge: Inaccessible home and Decreased caregiver support    Assessment:     Ewelina Arnold is a 72 y.o. female admitted with a medical diagnosis of MARYAM (acute kidney injury).  She presents with the following impairments/functional limitations:  weakness, impaired endurance, impaired self care skills, impaired functional mobilty, gait instability, impaired balance, decreased safety awareness, impaired cardiopulmonary response to activity. Pt tolerates session well with focus on gait training and transfers. Pt progressing well with improved distance and stability this day. Pt remains a fall risk as she continues to demonstrate poor lateral balance and decreased endurance. Pt has potential to improve gait stability but may benefit from use of RW. Pt will continue to benefit from therapy services to improve impairments listed above.     Rehab Prognosis:  Good ; patient would benefit from acute skilled PT services to address these deficits and reach maximum level of function.      Recent Surgery: * No surgery found *      Plan:     During this hospitalization, patient to be seen 3 x/week to address the above listed problems via gait training, therapeutic activities, therapeutic exercises  · Plan of Care Expires:  10/30/18   Plan of Care Reviewed with: patient    Subjective     Communicated with NSG prior to session.  Patient found sitting EOB upon PTA entry to room, agreeable to treatment.      Chief Complaint: no c/o  Patient comments/goals: "I feel pretty good, I'm supposed to be going to your facility([facility]) down the road sometime today."   Pain/Comfort:  · Pain Rating 1: 0/10  · Pain Rating Post-Intervention 1: 0/10    Patients cultural, spiritual, Mu-ism conflicts given the current situation: none " stated    Objective:     Patient found with: peripheral IV, telemetry     General Precautions: Standard, fall   Orthopedic Precautions:N/A   Braces: N/A     Functional Mobility:  · Bed Mobility:     · Scooting: modified independence  · Transfers:     · Sit to Stand:  contact guard assistance with no AD  · Bed to Chair: minimum assistance with  no AD  using  Stand Pivot  · Gait: Pt ambulates 70 ft and 86 ft with no AD and Min A. Pt requires assistance to maintain balance with slight lateral LOBs noted d/t increased lateral sway and narrow BRISSA. Pt with minimal SOB noted at end of each gait trial. Seated rest between trials.   · Balance: Pt sits with independence at EOB. Pt stands with CGA for static balance and Min A for ambulation.       AM-PAC 6 CLICK MOBILITY  Turning over in bed (including adjusting bedclothes, sheets and blankets)?: 3  Sitting down on and standing up from a chair with arms (e.g., wheelchair, bedside commode, etc.): 3  Moving from lying on back to sitting on the side of the bed?: 3  Moving to and from a bed to a chair (including a wheelchair)?: 3  Need to walk in hospital room?: 3  Climbing 3-5 steps with a railing?: 3  Basic Mobility Total Score: 18       Therapeutic Activities and Exercises:   Pt assisted with functional mobility as noted above.   Pt educated on energy conservation, deep breathing, and potential need for AD.   Pt educated on expectations for therapy in [facility] setting to prepare pt for d/c this day.   Pt agreeable to remaining UIC after transfer to bedside chair.     Patient left up in chair with call button in reach.    GOALS:   Multidisciplinary Problems     Physical Therapy Goals        Problem: Physical Therapy Goal    Goal Priority Disciplines Outcome Goal Variances Interventions   Physical Therapy Goal     PT, PT/OT Ongoing (interventions implemented as appropriate)     Description:  Goals to be met by: 10 days (10/10)    Patient will increase functional independence with  mobility by performin. Supine to sit with Set-up [place]  2. Sit to supine with Set-up [place]  3. Sit to stand transfer with Supervision  4. Gait  x 200 feet with Supervision using no assistive device  5. Lower extremity exercise program x30 reps per handout, with independence to improve muscular strength and endurance.   6. Pt will ambulate 6 steps with (B) HR and SBA.                         Time Tracking:     PT Received On: 10/04/18  PT Start Time: 1030     PT Stop Time: 1053  PT Total Time (min): 23 min     Billable Minutes: Gait Training 13 and Therapeutic Activity 10    Treatment Type: Treatment  PT/PTA: PTA     PTA Visit Number: 2     Ruddy Figueroa, PTA  10/04/2018

## 2018-10-05 ENCOUNTER — PATIENT OUTREACH (OUTPATIENT)
Dept: ADMINISTRATIVE | Facility: CLINIC | Age: 73
End: 2018-10-05

## 2018-10-05 PROBLEM — R53.81 DEBILITY: Status: ACTIVE | Noted: 2018-10-05

## 2018-10-05 PROCEDURE — 97110 THERAPEUTIC EXERCISES: CPT

## 2018-10-05 PROCEDURE — 97165 OT EVAL LOW COMPLEX 30 MIN: CPT

## 2018-10-05 PROCEDURE — 97802 MEDICAL NUTRITION INDIV IN: CPT

## 2018-10-05 PROCEDURE — 97530 THERAPEUTIC ACTIVITIES: CPT

## 2018-10-05 PROCEDURE — 97116 GAIT TRAINING THERAPY: CPT

## 2018-10-05 PROCEDURE — 99306 1ST NF CARE HIGH MDM 50: CPT | Mod: ,,, | Performed by: HOSPITALIST

## 2018-10-05 PROCEDURE — 97535 SELF CARE MNGMENT TRAINING: CPT

## 2018-10-05 PROCEDURE — 25000003 PHARM REV CODE 250: Performed by: HOSPITALIST

## 2018-10-05 PROCEDURE — 97161 PT EVAL LOW COMPLEX 20 MIN: CPT

## 2018-10-05 PROCEDURE — 11000004 HC SNF PRIVATE

## 2018-10-05 RX ADMIN — LATANOPROST 1 DROP: 50 SOLUTION OPHTHALMIC at 10:10

## 2018-10-05 RX ADMIN — CARVEDILOL 6.25 MG: 6.25 TABLET, FILM COATED ORAL at 09:10

## 2018-10-05 RX ADMIN — ATORVASTATIN CALCIUM 80 MG: 20 TABLET, FILM COATED ORAL at 10:10

## 2018-10-05 RX ADMIN — SODIUM BICARBONATE 650 MG TABLET 650 MG: at 09:10

## 2018-10-05 RX ADMIN — ASPIRIN 81 MG: 81 TABLET, COATED ORAL at 10:10

## 2018-10-05 RX ADMIN — LEVOTHYROXINE SODIUM 75 MCG: 75 TABLET ORAL at 06:10

## 2018-10-05 RX ADMIN — CARVEDILOL 6.25 MG: 6.25 TABLET, FILM COATED ORAL at 10:10

## 2018-10-05 RX ADMIN — CITALOPRAM HYDROBROMIDE 30 MG: 20 TABLET ORAL at 10:10

## 2018-10-05 RX ADMIN — HYDROXYCHLOROQUINE SULFATE 200 MG: 200 TABLET, FILM COATED ORAL at 09:10

## 2018-10-05 RX ADMIN — PANTOPRAZOLE SODIUM 40 MG: 40 TABLET, DELAYED RELEASE ORAL at 10:10

## 2018-10-05 RX ADMIN — DOXAZOSIN 1 MG: 1 TABLET ORAL at 09:10

## 2018-10-05 RX ADMIN — SODIUM BICARBONATE 650 MG TABLET 650 MG: at 02:10

## 2018-10-05 RX ADMIN — SODIUM BICARBONATE 650 MG TABLET 650 MG: at 10:10

## 2018-10-05 RX ADMIN — NIFEDIPINE 60 MG: 30 TABLET, FILM COATED, EXTENDED RELEASE ORAL at 09:10

## 2018-10-05 RX ADMIN — NIFEDIPINE 60 MG: 30 TABLET, FILM COATED, EXTENDED RELEASE ORAL at 10:10

## 2018-10-05 RX ADMIN — ISOSORBIDE MONONITRATE 60 MG: 30 TABLET, EXTENDED RELEASE ORAL at 10:10

## 2018-10-05 RX ADMIN — HYDROXYCHLOROQUINE SULFATE 200 MG: 200 TABLET, FILM COATED ORAL at 10:10

## 2018-10-05 NOTE — PLAN OF CARE
10/05/18 0712   Final Note   Assessment Type Final Discharge Note   Discharge Disposition SNF   Hospital Follow Up  Appt(s) scheduled? Yes

## 2018-10-05 NOTE — ASSESSMENT & PLAN NOTE
Cont with current dose of levothyroxine to treat   Lab Results   Component Value Date    TSH 0.954 09/05/2018    N1VQXKN 68 06/30/2017    W0XKZCJ 4.4 (L) 06/30/2017    FREET4 0.96 09/05/2018     Current treatment plan effective  No change in therapy  Repeat TSH as outpatient with PCP

## 2018-10-05 NOTE — PLAN OF CARE
Problem: Fall Risk (Adult)  Goal: Identify Related Risk Factors and Signs and Symptoms  Related risk factors and signs and symptoms are identified upon initiation of Human Response Clinical Practice Guideline (CPG)  Outcome: Ongoing (interventions implemented as appropriate)   10/05/18 9774   Fall Risk   Related Risk Factors (Fall Risk) fatigue/slow reaction;gait/mobility problems

## 2018-10-05 NOTE — PLAN OF CARE
Problem: Physical Therapy Goal  Goal: Physical Therapy Goal  Goals to be met by: 9 days     Patient will increase functional independence with mobility by performin. Supine to sit with Modified [place]  2. Sit to supine with Modified [place]  3. Sit to stand transfer with Supervision using the least restrictive device.  4. Bed to chair transfer with Supervision using the least restrictive device.  5. Gait  x 80 feet with Supervision using the least restrictive device.  6. Ascend/descend 8 steps with bilateral Handrails Stand-by Assistance.  7. Ascend/Descend 4 inch curb step with Supervision using the least restrictive device.  8. Stand for 3 minutes with Stand-by Assistance using the least restrictive device.  9. Lower extremity exercise program x20 reps per handout, with assistance as needed    Outcome: Ongoing (interventions implemented as appropriate)  PT Initial Evaluation. Follow PT POC.

## 2018-10-05 NOTE — ASSESSMENT & PLAN NOTE
Cont with PT/OT for gait training and strengthening and restoration of ADL's   Encourage mobility, OOB in chair, and early ambulation as appropriate  Fall precautions   Monitor for bowel and bladder dysfunction  Monitor for and prevent skin breakdown and pressure ulcers  Cont DVT prophylaxis with heparin   Anticoagulants   Medication Route Frequency    heparin (porcine) injection 5,000 Units Subcutaneous Q8H

## 2018-10-05 NOTE — PT/OT/SLP EVAL
PhysicalTherapy   Evaluation    Ewelina Arnold   MRN: [id number]     PT Received On: 10/05/18  PT Start Time: 1306     PT Stop Time: 1400    PT Total Time (min): 54 min       Billable Minutes:  Evaluation 15, Gait Training 15, Therapeutic Activity 9 and Therapeutic Exercise 15    Diagnosis: Debility CHF  Past Medical History:   Diagnosis Date    Acute hypoxemic respiratory failure 4/28/2018    Acute on chronic diastolic congestive heart failure 11/17/2017    Allergy     Anatomical narrow angle glaucoma     Anemia     States diagnosed about a month ago    Aortic atherosclerosis     Arthritis     Cataract     CHF (congestive heart failure)     Chronic kidney disease     Chronic sciatica of left side     Right lower back pain due to sciatica    Coronary artery disease     Depression     Dry eyes     GERD (gastroesophageal reflux disease)     History of colon cancer     Hyperaldosteronism     Hyperlipidemia     Hypertension     Hypothyroidism     Keloid cicatrix     Left ventricular diastolic dysfunction with preserved systolic function     Lupus (systemic lupus erythematosus) 8/17/2012    Malnutrition 5/16/2017    Osteoarthritis of cervical spine 8/17/2012    Osteopenia     PAD (peripheral artery disease)     FRAN (renal artery stenosis)       Past Surgical History:   Procedure Laterality Date    BIOPSY-BONE MARROW Left 10/16/2017    [facility]    CARDIAC CATHETERIZATION  07/27/2011    x1    CHOLECYSTECTOMY  1999    COLON SURGERY  2000 & 2003    partial removal    COLONOSCOPY N/A 4/14/2016    Procedure: COLONOSCOPY;  Surgeon: Jose Steen MD;  Location: [address]);  Service: Endoscopy;  Laterality: N/A;  prep 2 days prior light meals only/clear liquid day before  and 4 dulcolax tabs (5 mgs) at noon    COLONOSCOPY N/A 9/14/2017    Procedure: COLONOSCOPY;  Surgeon: Jose Steen MD;  Location: [address]);  Service: Endoscopy;   Laterality: N/A;    COLONOSCOPY N/A 9/14/2017    [facility] at [facility] ENDO (4TH FLR)    COLONOSCOPY N/A 4/14/2016    Performed by [facility] at [facility] ENDO (4TH FLR)    COLONOSCOPY N/A 4/23/2013    Performed by [facility] at [facility] ENDO (4TH FLR)    TALIA-TRANSFORAMINAL Left 11/3/2016    [facility]    ESOPHAGOGASTRODUODENOSCOPY (EGD) N/A 3/16/2017    [facility] ENDO    EYE SURGERY      HAND SURGERY Bilateral 1996 & 1997    due to carpal tunnel     HERNIA REPAIR      HYSTERECTOMY  1983    INJECTION-STEROID-EPIDURAL-TRANSFORAMINAL Left 1/7/2016    [facility]    Left heart cath Left 12/20/2017    Per[facility] Chandan Ly [facility] at [facility] CATH LAB    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    OOPHORECTOMY      removed one    Peripheral Iridotomy both eyes  1994    laser angle correction    THYROIDECTOMY, PARTIAL  2006    to remove two nodules and one-half thyroid     General Precautions: Standard, fall  Orthopedic Precautions: N/A   Braces: N/A    Patient History:  Lives With: alone  Living Arrangements: house  Home Accessibility: stairs to enter home  Home Layout: Able to live on 1st floor  Number of Stairs to Enter Home: 7(with threshold)  Stair Railings at Home: outside, present at both sides  Transportation Available: family or friend will provide  Living Environment Comment: Pt lives alone in [facility] with 7 steps to enter with bilateral handrails and threshold to enter. Prior to admission, patient was independent with functional ambulation and ADLs. Tub/shower combo with grab bars. Patient reports that she will have light assistance from daughter and son. Uses two pillows to sleep with at night.  Equipment Currently Used at Home: none  DME owned (not currently used): rolling walker, single point cane and quad cane    Previous Level of Function:  Ambulation  "Skills: independent  Transfer Skills: independent  ADL Skills: independent    Subjective:  Communicated with patient prior to session.  Patient agreeable to therapy session.  Chief Complaint: lower extremity weakness   Patient goals: to gain balance, "I feel unsteady when I walk"    Pain/Comfort  Pain Rating 1: 0/10  Pain Rating Post-Intervention 1: 0/10    Objective:  Patient found supine in bed with HOB elevated     Cognitive Exam:  Oriented to: Person, Place, Time and Situation  Follows Commands/attention: Follows multistep  commands  Communication: clear/fluent  Safety awareness/insight to disability: intact    Physical Exam:  Postural examination/scapula alignment: -       Rounded shoulders  -       Forward head    Skin integrity: Visible skin intact  Edema: Mild B LEs    Sensation:   -       Intact  light/touch grossly    Upper Extremity Range of Motion:  Right Upper Extremity: WFL  Left Upper Extremity: WFL    Upper Extremity Strength:  Right Upper Extremity: WFL  Left Upper Extremity: WFL    Lower Extremity Range of Motion:  Right Lower Extremity: WFL  Left Lower Extremity: WFL    Lower Extremity Strength:  Right Lower Extremity: WFL  Left Lower Extremity: WFL     Gross motor coordination: WFL    AM-PAC 6 CLICK MOBILITY  Total Score:18    Bed Mobility:  Sit>Supine: on mat, SBA  Supine>Sit: bed, SBA; mat, SBA    Transfers:  Sit<>Stand: to/from EOB using RW and SBA; to/from toilet using grab bar and SBA; to/from w/c (x3 trials) and SBA; to/from EOM using RW and SBA  Stand Pivot Transfer: EOM>w/c using RW and SBA  Cues for proper technique (hand placement), to lean fwd    Gait:  Amb 48 ft using RW and close SBA with w/c in tow. SpO2 measured with patient sitting in w/c, [id number]%, HR: 65 bpm.   Amb 47 ft using RW and close SBA   Narrow base of support; SOB at the end of each trial.     Therex:  Supine bilateral lower extremity therex x20: SAQ, QS, AP, GS    Balance:  Static sitting balance with perturbations " from various directions without loss of balance using upper extremities on mat for support.    Additional Treatment:  Patient able to perform toileting independently. Patient can manage pants (tori/doff) without assist from therapist.    Educated patient on the following:   PT role and POC   Importance of OOB activity   Participation during therapy session   Pursed lip breathing   White board updated     Patient left seated in bedside chair with call button in reach.    Assessment:  Ewelina Arnold is a 72 y.o. female with a medical diagnosis of Debility secondary to Congestive Heart Failure. Patient presents with the following impairments noted below requiring assistance during transfers and functional mobility for safety. Patient tolerated treatment well today. Patient limited in gait secondary to shortness of breath and fatigue, SpO2 100%. Patient educated on pursued lip breathing. She will benefit from skilled physical therapy to become more independent with transfers, improve balance and gait mechanics, and improve lower extremity muscular weakness. Follow physical therapy plan of care.    History: personal factors and / or co morbidities: decreased caregiver support, inaccessible home environment  Exam of Body Systems: musculoskeletal, neuromuscular, cognition  Functional Outcome Tools: AMPAC, MMT, ROM, VAS  Clinical Presentation: stable  Eval Complexity: low    Rehab identified problem list/impairments: weakness, impaired endurance, impaired functional mobilty, gait instability, impaired balance, decreased lower extremity function, decreased coordination, edema    Rehab potential is good.    Activity tolerance: Good    Discharge recommendations: home with home health     Barriers to discharge: Inaccessible home environment, Decreased caregiver support    Equipment recommendations: shower chair     GOALS:   Multidisciplinary Problems     Physical Therapy Goals        Problem: Physical Therapy Goal    Goal  Priority Disciplines Outcome Goal Variances Interventions   Physical Therapy Goal     PT, PT/OT Ongoing (interventions implemented as appropriate)     Description:  Goals to be met by: 9 days     Patient will increase functional independence with mobility by performin. Supine to sit with Modified [place]  2. Sit to supine with Modified [place]  3. Sit to stand transfer with Supervision using the least restrictive device.  4. Bed to chair transfer with Supervision using the least restrictive device.  5. Gait  x 80 feet with Supervision using the least restrictive device.  6. Ascend/descend 8 steps with bilateral Handrails Stand-by Assistance.  7. Ascend/Descend 4 inch curb step with Supervision using the least restrictive device.  8. Stand for 3 minutes with Stand-by Assistance using the least restrictive device.  9. Lower extremity exercise program x20 reps per handout, with assistance as needed                      PLAN:    Patient to be seen 5 x/week  to address the above listed problems via gait training, therapeutic activities, therapeutic exercises  Plan of Care Expires: 18  Judy Colón, SPT  I certify that I was present in the room directing the student in service delivery and guiding them using my skilled judgment. As the co-signing therapist I have reviewed the students documentation and am responsible for the treatment, assessment, and plan.     Bren Azevedo, PT 10/5/2018

## 2018-10-05 NOTE — H&P
[facility] [place] [facility] - Skilled Nursing Care  Department of [facility] Medicine  History & Physical    Patient Name: Ewelina Arnold  MRN: [id number]  Code Status: Full Code  Admission Date: 10/4/2018  Attending Physician: Luis Smith MD   Primary Care Provider: Amarilys Johns MD    Subjective:     Principal Problem:Debility    No chief complaint on file.       HPI: Chief Complaint/Reason for Admission: Debility    History of Present Illness:  Patient is a 72 y.o. female who has a past medical history of chronic diastolic congestive heart failure, glaucoma, Anemia, Cataract, Chronic kidney disease, Chronic sciatica of left side, Coronary artery disease, Depression, GERD, History of colon cancer, Hyperaldosteronism, Hyperlipidemia, Hypertension, Hypothyroidism, systemic lupus erythematosus, Osteoarthritis of cervical spine,peripheral artery disease, and renal artery stenosis presented with hypoxemic respiratory failure from acute decompensated heart failure. Patient diureses with lasix drip and then subsequently transitioned to oral Bumex and metolazone. Patient became hypotensive and diuretics put on hold. Nephrology consulted for patients CKD and stated patient will likely need initiation of HD in near future. Patient has been working with PT/OT who recommend SNF for further balance/mobility training. Patient currently with no complaints. She denies any fluid overload or edema. She denies any chest pain or dyspnea. She lives home along and is eager to discharge home.    Past Medical History:   Diagnosis Date    Acute hypoxemic respiratory failure 4/28/2018    Acute on chronic diastolic congestive heart failure 11/17/2017    Allergy     Anatomical narrow angle glaucoma     Anemia     States diagnosed about a month ago    Aortic atherosclerosis     Arthritis     Cataract     CHF (congestive heart failure)     Chronic kidney disease     Chronic sciatica of left side     Right lower back pain due to sciatica     Coronary artery disease     Depression     Dry eyes     GERD (gastroesophageal reflux disease)     History of colon cancer     Hyperaldosteronism     Hyperlipidemia     Hypertension     Hypothyroidism     Keloid cicatrix     Left ventricular diastolic dysfunction with preserved systolic function     Lupus (systemic lupus erythematosus) 8/17/2012    Malnutrition 5/16/2017    Osteoarthritis of cervical spine 8/17/2012    Osteopenia     PAD (peripheral artery disease)     FRAN (renal artery stenosis)        Past Surgical History:   Procedure Laterality Date    BIOPSY-BONE MARROW Left 10/16/2017    Performed by Grant Santillan [facility] at [facility] OR 2ND FLR    CARDIAC CATHETERIZATION  07/27/2011    x1    CHOLECYSTECTOMY  1999    COLON SURGERY  2000 & 2003    partial removal    COLONOSCOPY N/A 4/14/2016    Procedure: COLONOSCOPY;  Surgeon: Jose Steen MD;  Location: [facility] (4TH FLR);  Service: Endoscopy;  Laterality: N/A;  prep 2 days prior light meals only/clear liquid day before  and 4 dulcolax tabs (5 mgs) at noon    COLONOSCOPY N/A 9/14/2017    Procedure: COLONOSCOPY;  Surgeon: Jose Steen MD;  Location: [facility] (4TH FLR);  Service: Endoscopy;  Laterality: N/A;    COLONOSCOPY N/A 9/14/2017    Performed by [facility] at [facility] ENDO (4TH FLR)    COLONOSCOPY N/A 4/14/2016    Performed by [facility] at [facility] ENDO (4TH FLR)    COLONOSCOPY N/A 4/23/2013    Performed by Jose Steen MD at [facility] (4TH FLR)    TALIA-TRANSFORAMINAL Left 11/3/2016    [facility]    ESOPHAGOGASTRODUODENOSCOPY (EGD) N/A 3/16/2017    [facility] ENDO    EYE SURGERY      HAND SURGERY Bilateral 1996 & 1997    due to carpal tunnel     HERNIA REPAIR      HYSTERECTOMY  1983    INJECTION-STEROID-EPIDURAL-TRANSFORAMINAL Left 1/7/2016    Performed by [facility] at [facility]    Left heart cath Left 12/20/2017     Performed [facility]an [facility] CATH LAB    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    Clonidine Hallucinations     Hallucinations    Lactose     Arthrotec 50 [diclofenac-misoprostol]      Unknown    Bentyl [dicyclomine] Other (See Comments)     Not effective    Doxycycline Nausea Only    Lomotil [diphenoxylate-atropine] Nausea And Vomiting     Stomach cramps with vomitting    Lozol [indapamide] Rash    Norvasc [amlodipine]      Not tolerated    Tramadol Nausea Only       Current Facility-Administered Medications on File Prior to Encounter   Medication    [DISCONTINUED] acetaminophen tablet 650 mg    [DISCONTINUED] atorvastatin tablet 80 mg    [DISCONTINUED] bacitracin ointment    [DISCONTINUED] carvedilol tablet 6.25 mg    [DISCONTINUED] doxazosin tablet 1 mg    [DISCONTINUED] ergocalciferol capsule 50,000 Units    [DISCONTINUED] heparin (porcine) injection 5,000 Units    [DISCONTINUED] hydroxychloroquine tablet 200 mg    [DISCONTINUED] isosorbide mononitrate 24 hr tablet 60 mg    [DISCONTINUED] latanoprost 0.005 % ophthalmic solution 1 drop    [DISCONTINUED] levothyroxine tablet 75 mcg    [DISCONTINUED] NIFEdipine 24 hr tablet 60 mg    [DISCONTINUED] ondansetron disintegrating tablet 4 mg    [DISCONTINUED] pantoprazole EC tablet 40 mg    [DISCONTINUED] ramelteon tablet 8 mg    [DISCONTINUED] senna-docusate 8.6-50 mg per tablet 1  tablet    [DISCONTINUED] sodium bicarbonate tablet 650 mg    [DISCONTINUED] sodium chloride 0.9% flush 3 mL     Current Outpatient Medications on File Prior to Encounter   Medication Sig    aspirin (ECOTRIN) 81 MG EC tablet Take 1 tablet (81 mg total) by mouth once daily.    butalbital-aspirin-caffeine -40 mg (FIORINAL) -40 mg Cap Take 1 capsule by mouth.    calcipotriene 0.005 % sclp soln Apply 1 application topically 2 (two) times daily.    carvedilol (COREG) 6.25 MG tablet Take 1 tablet (6.25 mg total) by mouth 2 (two) times daily.    citalopram (CELEXA) 20 MG tablet TAKE 1 AND 1/2  TABLETS (30 MG TOTAL) ONCE DAILY.    clobetasol (TEMOVATE) 0.05 % external solution Apply topically 2 (two) times daily.    dicyclomine (BENTYL) 10 MG capsule Take 10 mg by mouth.    doxazosin (CARDURA) 1 MG tablet Take 1 tablet (1 mg total) by mouth every evening.    ergocalciferol (VITAMIN D2) 50,000 unit Cap Take 1 capsule (50,000 Units total) by mouth every 7 days.    famotidine (PEPCID) 20 MG tablet Take 1 tablet (20 mg total) by mouth once daily.    furosemide (LASIX) 40 MG tablet Take 1 tablet (40 mg total) by mouth daily as needed (for weight gain, shortness of breath).    hydroxychloroquine (PLAQUENIL) 200 mg tablet TAKE 1 TABLET TWICE DAILY    isosorbide mononitrate (IMDUR) 60 MG 24 hr tablet Take 1 tablet (60 mg total) by mouth once daily.    latanoprost 0.005 % ophthalmic solution INSTILL 1 DROP INTO BOTH EYES EVERY DAY    levothyroxine (SYNTHROID) 75 MCG tablet Take 1 tablet (75 mcg total) by mouth before breakfast.    mometasone (ELOCON) 0.1 % ointment Apply topically once daily. To rashes for 14 days    NIFEdipine (PROCARDIA-XL) 60 MG (OSM) 24 hr tablet Take 1 tablet (60 mg total) by mouth 2 (two) times daily.    rosuvastatin (CRESTOR) 10 MG tablet Take 1 tablet (10 mg total) by mouth every evening.    sodium bicarbonate 650 MG tablet Take 1 tablet (650 mg total) by mouth 3 (three) times  daily.     Family History     Problem Relation (Age of Onset)    Diabetes Maternal Grandmother, Son, Maternal Aunt    Glaucoma Maternal Grandmother    Heart attack Mother    Heart disease Father    Hypertension Mother, Father, Sister, Brother    Kidney disease Brother    Kidney failure Brother    No Known Problems Daughter, Maternal Grandfather, Paternal Grandmother, Paternal Grandfather        Tobacco Use    Smoking status: Former Smoker     Packs/day: 1.50     Years: 30.00     Pack years: 45.00     Types: Cigarettes     Start date:      Last attempt to quit: 3/13/1985     Years since quittin.5    Smokeless tobacco: Never Used   Substance and Sexual Activity    Alcohol use: No    Drug use: No    Sexual activity: Not Currently     Partners: Male     Birth control/protection: Abstinence     Review of Systems   Constitutional: Negative for chills, fatigue and fever.   HENT: Negative for congestion, facial swelling, hearing loss and trouble swallowing.    Eyes: Negative for photophobia and visual disturbance.   Respiratory: Negative for chest tightness, shortness of breath and wheezing.    Cardiovascular: Negative for chest pain, palpitations and leg swelling.   Gastrointestinal: Negative for abdominal pain, blood in stool, constipation, diarrhea, nausea and vomiting.   Endocrine: Negative.    Genitourinary: Negative.    Musculoskeletal: Negative for back pain, joint swelling and myalgias.   Skin: Negative.    Allergic/Immunologic: Negative.    Neurological: Negative for dizziness, facial asymmetry, speech difficulty, weakness and numbness.   Hematological: Negative.    Psychiatric/Behavioral: Negative for agitation, confusion and dysphoric mood. The patient is not nervous/anxious.      Objective:     Vital Signs (Most Recent):  Temp: 97.6 °F (36.4 °C) (10/05/18 0800)  Pulse: 62 (10/05/18 0800)  Resp: 18 (10/05/18 08)  BP: (!) 150/60 (10/05/18 0800)  SpO2: 98 % (10/05/18 0800) Vital Signs (24h  Range):  Temp:  [96.9 °F (36.1 °C)-98.1 °F (36.7 °C)] 97.6 °F (36.4 °C)  Pulse:  [62-66] 62  Resp:  [18-20] 18  SpO2:  [96 %-99 %] 98 %  BP: (131-160)/(60-72) 150/60     Weight: 48.4 kg (106 lb 11.2 oz)  Body mass index is 18.32 kg/m².    Physical Exam   Constitutional: She is oriented to person, place, and time. Vital signs are normal. She appears well-developed and well-nourished. She appears cachectic.  Non-toxic appearance. She does not appear ill. No distress.   HENT:   Head: Normocephalic and atraumatic.   Eyes: Conjunctivae and EOM are normal. Pupils are equal, round, and reactive to light. No scleral icterus.   Neck: Normal range of motion and full passive range of motion without pain. Neck supple. No JVD present. Carotid bruit is not present. No thyromegaly present.   Cardiovascular: Normal rate, regular rhythm, S1 normal, S2 normal and normal pulses. Exam reveals no gallop, no S3, no S4 and no friction rub.   Murmur heard.   Crescendo decrescendo systolic murmur is present with a grade of 2/6.  Pulmonary/Chest: Effort normal and breath sounds normal. No accessory muscle usage. No tachypnea. No respiratory distress. She has no decreased breath sounds. She has no wheezes. She has no rhonchi. She has no rales.   Abdominal: Soft. Normal appearance and bowel sounds are normal. She exhibits no shifting dullness, no distension, no abdominal bruit and no ascites. There is no hepatosplenomegaly. There is no tenderness. There is no rigidity and no guarding.   Musculoskeletal: Normal range of motion. She exhibits no edema or tenderness.   Neurological: She is alert and oriented to person, place, and time. She has normal strength. She is not disoriented. No cranial nerve deficit or sensory deficit. GCS eye subscore is 4. GCS verbal subscore is 5. GCS motor subscore is 6.   Skin: Skin is warm, dry and intact. No abrasion and no lesion noted.   Psychiatric: She has a normal mood and affect. Her behavior is normal.  Judgment and thought content normal. Her mood appears not anxious. Her speech is not slurred. She is not actively hallucinating. Cognition and memory are normal. She does not exhibit a depressed mood. She is attentive.     Significant Labs:   BMP:   Recent Labs   Lab  10/04/18   0349   GLU  79   NA  138   K  3.5   CL  100   CO2  25   BUN  120*   CREATININE  4.4*   CALCIUM  8.5*   MG  1.8     CBC:   Recent Labs   Lab  10/04/18   0349   WBC  5.91   HGB  8.8*   HCT  28.1*   PLT  446*     Significant Imaging: I have reviewed all pertinent imaging results/findings within the past 24 hours.    Assessment/Plan:     * Debility    Cont with PT/OT for gait training and strengthening and restoration of ADL's   Encourage mobility, OOB in chair, and early ambulation as appropriate  Fall precautions   Monitor for bowel and bladder dysfunction  Monitor for and prevent skin breakdown and pressure ulcers  Cont DVT prophylaxis with heparin   Anticoagulants   Medication Route Frequency    heparin (porcine) injection 5,000 Units Subcutaneous Q8H             Vitamin D deficiency    Cont Vit D supplement to treat         Metabolic acidosis, normal anion gap (NAG)    Cont Sodium bicarb to treat        Anemia in stage 4 chronic kidney disease    Lab Results   Component Value Date    HGB 8.8 (L) 10/04/2018     H/H stable  Cont to monitor.         Malnutrition    Dietary consult         Major depressive disorder with single episode, in full remission    Chronic and stable  Cont Citalopram to treat        CKD (chronic kidney disease), stage IV    Lab Results   Component Value Date    CREATININE 4.4 (H) 10/04/2018     Sr Cr stable but BUN slightly trending upwards  Cont to monitor with biweekly labs   Avoid nephrotoxins.   Renally dose all medications   Monitor urine output   Cont sodium bicarb to treat  Patient will need follow up with Nephrology while at SNF        Dyslipidemia    Cont atorvastatin to treat        GERD (gastroesophageal  reflux disease)    Cont Protonix to treat        Left ventricular diastolic dysfunction with preserved systolic function    Maintain euvolemia by monitoring I/O's and daily weights.  Fluid restriction (1.5 liters/24 hours)  Low Na diet  Monitor for signs of fluid overload: O2 sat<92%, weight gain of >3 lbs, or urinary output <160ml/8hr  Maintain oxygen sats >92% via NC if supplemental oxygen needed.   Current Bumex and Metolazone on hold due to hypotension. Cont to monitor and restart when appropriate. Patient is currently below her dry weight as listed below.   Cont coreg, Imdur to treat      Patient Vitals for the past 72 hrs (Last 3 readings):   Weight   10/05/18 0615 48.4 kg (106 lb 11.2 oz)             Acquired hypothyroidism    Cont with current dose of levothyroxine to treat   Lab Results   Component Value Date    TSH 0.954 09/05/2018    [id number] 68 06/30/2017    L6MZISP 4.4 (L) 06/30/2017    FREET4 0.96 09/05/2018     Current treatment plan effective  No change in therapy  Repeat TSH as outpatient with PCP          Edema    Low Na diet  NADER hose  Leg elevation   Lasix PRN for now         Residual stage angle-closure glaucoma - Both Eyes    Cont home Latanoprost to treat.         Resistant hypertension    BP Readings from Last 3 Encounters:   10/05/18 (!) 150/60   10/04/18 (!) 160/72   09/28/18 123/63     BP ok   Cont current Coreg, Nifedipine, doxazosin, imdur to treat  Cardiac diet          Coronary artery disease due to calcified coronary lesion    Cont current medical management with Coreg, ASA, atorvastatin, Imdur to treat  No current complaints of CP or PEDRO        Other forms of systemic lupus erythematosus    Continue home hydroxychloroquine 200mg bid            I certify that SNF services are required to be given on an inpatient basis because Ewelina Arnold's needs for skilled nursing care and/or skilled rehabilitation are required on a daily basis and such services can only practically be provided in a  skilled nursing facility setting and are for an ongoing condition for which she received inpatient care in the hospital.     Future Appointments   Date Time Provider Department Center   10/11/2018  2:00 PM Amarilys Johns MD [facility] MED CLN [facility] W   10/30/2018  9:30 AM ANNUAL WELLNESS VISIT-NURSE PRACTITIONER, NOM 2 [facility] IM [facility] PCW   10/30/2018 11:00 AM Amarilys Johns MD [facility] MED CLN [facility] W   11/6/2018  9:00 AM Yeimi Burdick MD [facility] HEARTTX [facility]     PCP appointment can be rescheduled while at SNF. Patient will need nephrology and cardiology follow up while at SNF.     Patient's care plan and discharge planning will be discussed by the SNF team in IDT meeting weekly. Medications to be reviewed and discussed with the SNF unit clinical pharmacist.    Luis Mensah MD  Department of Hospital Medicine  [facility] [place] [facility] [place] [facility]

## 2018-10-05 NOTE — ASSESSMENT & PLAN NOTE
BP Readings from Last 3 Encounters:   10/05/18 (!) 150/60   10/04/18 (!) 160/72   09/28/18 123/63     BP ok   Cont current Coreg, Nifedipine, doxazosin, imdur to treat  Cardiac diet

## 2018-10-05 NOTE — HPI
Chief Complaint/Reason for Admission: Debility    History of Present Illness:  Patient is a 72 y.o. female who has a past medical history of chronic diastolic congestive heart failure, glaucoma, Anemia, Cataract, Chronic kidney disease, Chronic sciatica of left side, Coronary artery disease, Depression, GERD, History of colon cancer, Hyperaldosteronism, Hyperlipidemia, Hypertension, Hypothyroidism, systemic lupus erythematosus, Osteoarthritis of cervical spine,peripheral artery disease, and renal artery stenosis presented with hypoxemic respiratory failure from acute decompensated heart failure. Patient diureses with lasix drip and then subsequently transitioned to oral Bumex and metolazone. Patient became hypotensive and diuretics put on hold. Nephrology consulted for patients CKD and stated patient will likely need initiation of HD in near future. Patient has been working with PT/OT who recommend SNF for further balance/mobility training. Patient currently with no complaints. She denies any fluid overload or edema. She denies any chest pain or dyspnea. She lives home along and is eager to discharge home.

## 2018-10-05 NOTE — PROGRESS NOTES
PATIENT ARRIVED TO SNF UNIT ROOM 310 PER WHEELCHAIR. APPEARS ALERT AND ORIENTED. IN NO DISTRESS. SEE ASSESSMENT.

## 2018-10-05 NOTE — ASSESSMENT & PLAN NOTE
Cont current medical management with Coreg, ASA, atorvastatin, Imdur to treat  No current complaints of CP or PEDRO

## 2018-10-05 NOTE — SUBJECTIVE & OBJECTIVE
Past Medical History:   Diagnosis Date    Acute hypoxemic respiratory failure 4/28/2018    Acute on chronic diastolic congestive heart failure 11/17/2017    Allergy     Anatomical narrow angle glaucoma     Anemia     States diagnosed about a month ago    Aortic atherosclerosis     Arthritis     Cataract     CHF (congestive heart failure)     Chronic kidney disease     Chronic sciatica of left side     Right lower back pain due to sciatica    Coronary artery disease     Depression     Dry eyes     GERD (gastroesophageal reflux disease)     History of colon cancer     Hyperaldosteronism     Hyperlipidemia     Hypertension     Hypothyroidism     Keloid cicatrix     Left ventricular diastolic dysfunction with preserved systolic function     Lupus (systemic lupus erythematosus) 8/17/2012    Malnutrition 5/16/2017    Osteoarthritis of cervical spine 8/17/2012    Osteopenia     PAD (peripheral artery disease)     FRAN (renal artery stenosis)        Past Surgical History:   Procedure Laterality Date    BIOPSY-BONE MARROW Left 10/16/2017    Performed by Grant [facility]illan [facility] at [facility] OR 2ND FLR    CARDIAC CATHETERIZATION  07/27/2011    x1    CHOLECYSTECTOMY  1999    COLON SURGERY  2000 & 2003    partial removal    COLONOSCOPY N/A 4/14/2016    Procedure: COLONOSCOPY;  Surgeon: Jose Steen MD;  Location: [facility] ([address] [place] [address]);  Service: Endoscopy;  Laterality: N/A;  prep 2 days prior light meals only/clear liquid day before  and 4 dulcolax tabs (5 mgs) at noon    COLONOSCOPY N/A 9/14/2017    Procedure: COLONOSCOPY;  Surgeon: Jose Steen MD;  Location: [facility] ([address] [place] [address]);  Service: Endoscopy;  Laterality: N/A;    COLONOSCOPY N/A 9/14/2017    Performed by Jose Steen MD at [facility] (4TH FLR)    COLONOSCOPY N/A 4/14/2016    Performed by Jose Steen MD at [facility] (4TH FLR)    COLONOSCOPY N/A 4/23/2013    Performed by Jose Steen MD at [facility] (4TH FLR)    TALIA-TRANSFORAMINAL  Left 11/3/2016    [facility]    ESOPHAGOGASTRODUODENOSCOPY (EGD) N/A 3/16/2017    Per[facility] at [facility] ENDO    EYE SURGERY      HAND SURGERY Bilateral 1996 & 1997    due to carpal tunnel     HERNIA REPAIR      HYSTERECTOMY  1983    INJECTION-STEROID-EPIDURAL-TRANSFORAMINAL Left 1/7/2016    Per[facility] at [facility]    Left heart cath Left 12/20/2017    Performed by Chandan Ly [facility] at [facility] CATH LAB    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    Clonidine Hallucinations     Hallucinations    Lactose     Arthrotec 50 [diclofenac-misoprostol]      Unknown    Bentyl [dicyclomine] Other (See Comments)     Not effective    Doxycycline Nausea Only    Lomotil [diphenoxylate-atropine] Nausea And Vomiting     Stomach cramps with vomitting    Lozol [indapamide] Rash    Norvasc [amlodipine]      Not tolerated    Tramadol Nausea Only       Current Facility-Administered Medications on File Prior to Encounter   Medication    [DISCONTINUED] acetaminophen tablet 650 mg    [DISCONTINUED] atorvastatin tablet 80 mg    [DISCONTINUED] bacitracin ointment    [DISCONTINUED] carvedilol tablet 6.25 mg    [DISCONTINUED] doxazosin tablet 1 mg    [DISCONTINUED] ergocalciferol capsule 50,000 Units    [DISCONTINUED] heparin (porcine) injection 5,000 Units    [DISCONTINUED]  hydroxychloroquine tablet 200 mg    [DISCONTINUED] isosorbide mononitrate 24 hr tablet 60 mg    [DISCONTINUED] latanoprost 0.005 % ophthalmic solution 1 drop    [DISCONTINUED] levothyroxine tablet 75 mcg    [DISCONTINUED] NIFEdipine 24 hr tablet 60 mg    [DISCONTINUED] ondansetron disintegrating tablet 4 mg    [DISCONTINUED] pantoprazole EC tablet 40 mg    [DISCONTINUED] ramelteon tablet 8 mg    [DISCONTINUED] senna-docusate 8.6-50 mg per tablet 1 tablet    [DISCONTINUED] sodium bicarbonate tablet 650 mg    [DISCONTINUED] sodium chloride 0.9% flush 3 mL     Current Outpatient Medications on File Prior to Encounter   Medication Sig    aspirin (ECOTRIN) 81 MG EC tablet Take 1 tablet (81 mg total) by mouth once daily.    butalbital-aspirin-caffeine -40 mg (FIORINAL) -40 mg Cap Take 1 capsule by mouth.    calcipotriene 0.005 % sclp soln Apply 1 application topically 2 (two) times daily.    carvedilol (COREG) 6.25 MG tablet Take 1 tablet (6.25 mg total) by mouth 2 (two) times daily.    citalopram (CELEXA) 20 MG tablet TAKE 1 AND 1/2  TABLETS (30 MG TOTAL) ONCE DAILY.    clobetasol (TEMOVATE) 0.05 % external solution Apply topically 2 (two) times daily.    dicyclomine (BENTYL) 10 MG capsule Take 10 mg by mouth.    doxazosin (CARDURA) 1 MG tablet Take 1 tablet (1 mg total) by mouth every evening.    ergocalciferol (VITAMIN D2) 50,000 unit Cap Take 1 capsule (50,000 Units total) by mouth every 7 days.    famotidine (PEPCID) 20 MG tablet Take 1 tablet (20 mg total) by mouth once daily.    furosemide (LASIX) 40 MG tablet Take 1 tablet (40 mg total) by mouth daily as needed (for weight gain, shortness of breath).    hydroxychloroquine (PLAQUENIL) 200 mg tablet TAKE 1 TABLET TWICE DAILY    isosorbide mononitrate (IMDUR) 60 MG 24 hr tablet Take 1 tablet (60 mg total) by mouth once daily.    latanoprost 0.005 % ophthalmic solution INSTILL 1 DROP INTO BOTH EYES EVERY DAY    levothyroxine  (SYNTHROID) 75 MCG tablet Take 1 tablet (75 mcg total) by mouth before breakfast.    mometasone (ELOCON) 0.1 % ointment Apply topically once daily. To rashes for 14 days    NIFEdipine (PROCARDIA-XL) 60 MG (OSM) 24 hr tablet Take 1 tablet (60 mg total) by mouth 2 (two) times daily.    rosuvastatin (CRESTOR) 10 MG tablet Take 1 tablet (10 mg total) by mouth every evening.    sodium bicarbonate 650 MG tablet Take 1 tablet (650 mg total) by mouth 3 (three) times daily.     Family History     Problem Relation (Age of Onset)    Diabetes Maternal Grandmother, Son, Maternal Aunt    Glaucoma Maternal Grandmother    Heart attack Mother    Heart disease Father    Hypertension Mother, Father, Sister, Brother    Kidney disease Brother    Kidney failure Brother    No Known Problems Daughter, Maternal Grandfather, Paternal Grandmother, Paternal Grandfather        Tobacco Use    Smoking status: Former Smoker     Packs/day: 1.50     Years: 30.00     Pack years: 45.00     Types: Cigarettes     Start date:      Last attempt to quit: 3/13/1985     Years since quittin.5    Smokeless tobacco: Never Used   Substance and Sexual Activity    Alcohol use: No    Drug use: No    Sexual activity: Not Currently     Partners: Male     Birth control/protection: Abstinence     Review of Systems   Constitutional: Negative for chills, fatigue and fever.   HENT: Negative for congestion, facial swelling, hearing loss and trouble swallowing.    Eyes: Negative for photophobia and visual disturbance.   Respiratory: Negative for chest tightness, shortness of breath and wheezing.    Cardiovascular: Negative for chest pain, palpitations and leg swelling.   Gastrointestinal: Negative for abdominal pain, blood in stool, constipation, diarrhea, nausea and vomiting.   Endocrine: Negative.    Genitourinary: Negative.    Musculoskeletal: Negative for back pain, joint swelling and myalgias.   Skin: Negative.    Allergic/Immunologic: Negative.     Neurological: Negative for dizziness, facial asymmetry, speech difficulty, weakness and numbness.   Hematological: Negative.    Psychiatric/Behavioral: Negative for agitation, confusion and dysphoric mood. The patient is not nervous/anxious.      Objective:     Vital Signs (Most Recent):  Temp: 97.6 °F (36.4 °C) (10/05/18 0800)  Pulse: 62 (10/05/18 0800)  Resp: 18 (10/05/18 0800)  BP: (!) 150/60 (10/05/18 0800)  SpO2: 98 % (10/05/18 0800) Vital Signs (24h Range):  Temp:  [96.9 °F (36.1 °C)-98.1 °F (36.7 °C)] 97.6 °F (36.4 °C)  Pulse:  [62-66] 62  Resp:  [18-20] 18  SpO2:  [96 %-99 %] 98 %  BP: (131-160)/(60-72) 150/60     Weight: 48.4 kg (106 lb 11.2 oz)  Body mass index is 18.32 kg/m².    Physical Exam   Constitutional: She is oriented to person, place, and time. Vital signs are normal. She appears well-developed and well-nourished. She appears cachectic.  Non-toxic appearance. She does not appear ill. No distress.   HENT:   Head: Normocephalic and atraumatic.   Eyes: Conjunctivae and EOM are normal. Pupils are equal, round, and reactive to light. No scleral icterus.   Neck: Normal range of motion and full passive range of motion without pain. Neck supple. No JVD present. Carotid bruit is not present. No thyromegaly present.   Cardiovascular: Normal rate, regular rhythm, S1 normal, S2 normal and normal pulses. Exam reveals no gallop, no S3, no S4 and no friction rub.   Murmur heard.   Crescendo decrescendo systolic murmur is present with a grade of 2/6.  Pulmonary/Chest: Effort normal and breath sounds normal. No accessory muscle usage. No tachypnea. No respiratory distress. She has no decreased breath sounds. She has no wheezes. She has no rhonchi. She has no rales.   Abdominal: Soft. Normal appearance and bowel sounds are normal. She exhibits no shifting dullness, no distension, no abdominal bruit and no ascites. There is no hepatosplenomegaly. There is no tenderness. There is no rigidity and no guarding.    Musculoskeletal: Normal range of motion. She exhibits no edema or tenderness.   Neurological: She is alert and oriented to person, place, and time. She has normal strength. She is not disoriented. No cranial nerve deficit or sensory deficit. GCS eye subscore is 4. GCS verbal subscore is 5. GCS motor subscore is 6.   Skin: Skin is warm, dry and intact. No abrasion and no lesion noted.   Psychiatric: She has a normal mood and affect. Her behavior is normal. Judgment and thought content normal. Her mood appears not anxious. Her speech is not slurred. She is not actively hallucinating. Cognition and memory are normal. She does not exhibit a depressed mood. She is attentive.     Significant Labs:   BMP:   Recent Labs   Lab  10/04/18   0349   GLU  79   NA  138   K  3.5   CL  100   CO2  25   BUN  120*   CREATININE  4.4*   CALCIUM  8.5*   MG  1.8     CBC:   Recent Labs   Lab  10/04/18   0349   WBC  5.91   HGB  8.8*   HCT  28.1*   PLT  446*     Significant Imaging: I have reviewed all pertinent imaging results/findings within the past 24 hours.

## 2018-10-05 NOTE — PLAN OF CARE
Problem: Patient Care Overview  Goal: Plan of Care Review  Outcome: Ongoing (interventions implemented as appropriate)   Malnutrition in the context of Chronic Illness/Injury     Related to (etiology):  Poor appetite, limited food choices on special diet as perceived by patient     Signs and Symptoms (as evidenced by):  Energy Intake: <75% of estimated energy requirement for > one month   Body Fat Depletion: moderate depletion of orbitals, triceps and thoracic and lumbar region   Muscle Mass Depletion: moderate depletion of temples, clavicle region, scapular region, interosseous muscle and lower extremities   Weight Loss: 7% x 90 days ( weight 59.5 kg 3/31/17 now 48.4 kg)     Interventions/Recommendations (treatment strategy):  Nutrition Education  Renal diet, 2 gm Na, 1.2L fluid restriction, Lactaid milk 1/2c daily as requested  Consider liberalizing diet for dc     Nutrition Diagnosis Status:   New          Recommendation/Intervention: Continue renal diet, is renal labs WNL , dc renal in favor of  fluid and sodium control , Educated patient on diets, renal, low sodium, fluid restriction with list of potassium content of foods.  Goals: PO to meet 85% of EEN in one week  Communication of RD Recs: (S) other (comment)(POC)

## 2018-10-05 NOTE — CONSULTS
"  [facility]  Adult Nutrition  Consult Note    SUMMARY     Recommendations    Recommendation/Intervention: Continue renal diet, is renal labs WNL , dc renal for fluid and sodium control , Educated patient on diets, renal, low sodium, fluid restriction with list of potassium content of foods.  Goals: PO to meet 85% of EEN in one week  Nutrition Goal Status: new  Communication of NICHO Recs: (S) other (comment)(POC)    Reason for Assessment    Reason for Assessment: consult  Diagnosis: (CHF)  Relevant Medical History: HTN, Lupus, HLD, CKD, anemia, PAD, CHF, malnutrition 2017,   Interdisciplinary Rounds: attended  General Information Comments: Lactose intolerant, has been following low sodium diet very strictly and continues to lose weight. Uses almond milk at home, may try soymilk here on cereal or oatmeal. does not like the aftertaste on ONS but may try via straw.   Nutrition Discharge Planning: DC on low sodium diet, fluid restriction per MD    Nutrition Risk Screen    Nutrition Risk Screen: no indicators present    Nutrition/Diet History    Patient Reported Diet/Restrictions/Preferences: lactose restricted  Food Preferences: no dairy at all, no fish will eat tuna, no coffee  Do you have any cultural, spiritual, Oriental orthodox conflicts, given your current situation?: none  Supplemental Drinks or Food Habits: Ensure Plus  Food Allergies: (lactose)  Factors Affecting Nutritional Intake: diarrhea, altered gastrointestinal function    Anthropometrics    Temp: 97.6 °F (36.4 °C)  Height Method: Stated  Height: 5' 4" (162.6 cm)  Height (inches): 64 in  Weight Method: Standard Scale  Weight: 48.4 kg (106 lb 11.2 oz)  Weight (lb): 106.7 lb  Ideal Body Weight (IBW), Female: 120 lb  % Ideal Body Weight, Female (lb): 88.92 lb  BMI (Calculated): 18.4  BMI Grade: 17 - 18.4 protein-energy malnutrition grade I  Weight Loss: unintentional  Usual Body Weight (UBW), k.8 kg  % Usual Body Weight: 93.63  % Weight " Change From Usual Weight: -6.56 %       Lab/Procedures/Meds    Pertinent Labs Reviewed: reviewed  Pertinent Labs Comments: , Cr 4.4, glucose 79, albumin 2.1  Pertinent Medications Reviewed: reviewed  Pertinent Medications Comments: ASA, heparin, statin, Vit D, pantoprazole, senna,     Physical Findings/Assessment  NFPE completed 10/5/18  Overall Physical Appearance: loss of muscle mass, loss of subcutaneous fat, weak  Tubes: (-)  Oral/Mouth Cavity: WDL  Skin: intact    Estimated/Assessed Needs    Weight Used For Calorie Calculations: 48.4 kg (106 lb 11.2 oz)  Energy Calorie Requirements (kcal): 1300  Energy Need Method: Warren-St Jeor(x 1.3)  Protein Requirements: 48-40g(.8-1.0g/kg)  Weight Used For Protein Calculations: 48.4 kg (106 lb 11.2 oz)  Fluid Requirements (mL): 1200 ml per MD     RDA Method (mL): 1300  CHO Requirement: -      Nutrition Prescription Ordered    Current Diet Order: Renal , 1.2L fluid restriction, 2gm Na  Nutrition Order Comments: pt feels her diet befoe the renal , was so restrictive there was nothing to eat and she lost weight. Pt states due to her GI hx, surgery, that foods go right through her and she was interested in discussion regarding soluble fiber.  Oral Nutrition Supplement: none    Evaluation of Received Nutrient/Fluid Intake    Energy Calories Required: not meeting needs  Protein Required: meeting needs  Fluid Required: meeting needs  Tolerance: tolerating  % Intake of Estimated Energy Needs: 75%  % Meal Intake: 75%    Nutrition Risk    Level of Risk/Frequency of Follow-up: low     Assessment and Plan    Malnutrition    Malnutrition in the context of Chronic Illness/Injury    Related to (etiology):  Poor appetite, limited food choices on special diet as perceived by patient    Signs and Symptoms (as evidenced by):  Energy Intake: <75% of estimated energy requirement for > one month   Body Fat Depletion: moderate depletion of orbitals, triceps and thoracic and lumbar region    Muscle Mass Depletion: moderate depletion of temples, clavicle region, scapular region, interosseous muscle and lower extremities   Weight Loss: 7% x 90 days ( weight 59.5 kg 3/31/17 now 48.4 kg)    Interventions/Recommendations (treatment strategy):  Nutrition Education  Renal diet, 2 gm Na, 1.2L fluid restriction, Lactaid milk 1/2c daily as requested  Consider liberalizing diet for dc    Nutrition Diagnosis Status:   New             Monitor and Evaluation    Food and Nutrient Intake: food and beverage intake  Food and Nutrient Adminstration: diet order  Knowledge/Beliefs/Attitudes: food and nutrition knowledge/skill  Anthropometric Measurements: weight change  Biochemical Data, Medical Tests and Procedures: electrolyte and renal panel, inflammatory profile, gastrointestinal profile  Nutrition-Focused Physical Findings: overall appearance     Nutrition Follow-Up    RD Follow-up?: Yes

## 2018-10-05 NOTE — ASSESSMENT & PLAN NOTE
Lab Results   Component Value Date    CREATININE 4.4 (H) 10/04/2018     Sr Cr stable but BUN slightly trending upwards  Cont to monitor with biweekly labs   Avoid nephrotoxins.   Renally dose all medications   Monitor urine output   Cont sodium bicarb to treat  Patient will need follow up with Nephrology while at SNF

## 2018-10-05 NOTE — ASSESSMENT & PLAN NOTE
Malnutrition in the context of Chronic Illness/Injury    Related to (etiology):  Poor appetite, limited food choices on special diet as perceived by patient    Signs and Symptoms (as evidenced by):  Energy Intake: <75% of estimated energy requirement for > one month   Body Fat Depletion: moderate depletion of orbitals, triceps and thoracic and lumbar region   Muscle Mass Depletion: moderate depletion of temples, clavicle region, scapular region, interosseous muscle and lower extremities   Weight Loss: 7% x 90 days ( weight 59.5 kg 3/31/17 now 48.4 kg)    Interventions/Recommendations (treatment strategy):  Nutrition Education  Renal diet, 2 gm Na, 1.2L fluid restriction, Lactaid milk 1/2c daily as requested  Consider liberalizing diet for dc    Nutrition Diagnosis Status:   New

## 2018-10-05 NOTE — PT/OT/SLP EVAL
Occupational Therapy  Evaluation/ Treatment    Ewelina Arnold   MRN: [id number]   Admitting Diagnosis: MARYAM ( Acute Renal Insufficiency)      OT Date of Treatment: 10/05/18   OT Start Time: 0830  OT Stop Time: 0930  OT Total Time (min): 60 min    Billable Minutes:  Evaluation 20  Self Care/Home Management 25  Therapeutic Exercise 15    Diagnosis: MARYAM ( Acute Renal Insufficiency)      Past Medical History:   Diagnosis Date    Acute hypoxemic respiratory failure 4/28/2018    Acute on chronic diastolic congestive heart failure 11/17/2017    Allergy     Anatomical narrow angle glaucoma     Anemia     States diagnosed about a month ago    Aortic atherosclerosis     Arthritis     Cataract     CHF (congestive heart failure)     Chronic kidney disease     Chronic sciatica of left side     Right lower back pain due to sciatica    Coronary artery disease     Depression     Dry eyes     GERD (gastroesophageal reflux disease)     History of colon cancer     Hyperaldosteronism     Hyperlipidemia     Hypertension     Hypothyroidism     Keloid cicatrix     Left ventricular diastolic dysfunction with preserved systolic function     Lupus (systemic lupus erythematosus) 8/17/2012    Malnutrition 5/16/2017    Osteoarthritis of cervical spine 8/17/2012    Osteopenia     PAD (peripheral artery disease)     FRAN (renal artery stenosis)       Past Surgical History:   Procedure Laterality Date    BIOPSY-BONE MARROW Left 10/16/2017    Performed by Grant Santillan [facility] at [facility] OR [address] [place] [address]    CARDIAC CATHETERIZATION  07/27/2011    x1    CHOLECYSTECTOMY  1999    COLON SURGERY  2000 & 2003    partial removal    COLONOSCOPY N/A 4/14/2016    Procedure: COLONOSCOPY;  Surgeon: Jose Steen MD;  Location: [facility] ([address] [place] [address]);  Service: Endoscopy;  Laterality: N/A;  prep 2 days prior light meals only/clear liquid day before  and 4 dulcolax tabs (5 mgs) at noon    COLONOSCOPY N/A 9/14/2017    Procedure: COLONOSCOPY;   Surgeon: Jose Steen MD;  Location: [facility] (4TH FLR);  Service: Endoscopy;  Laterality: N/A;    COLONOSCOPY N/A 9/14/2017    Performed by Jose Steen MD at [facility] ENDO (4TH FLR)    COLONOSCOPY N/A 4/14/2016    Performed by Jose Steen MD at [facility] ENDO (4TH FLR)    COLONOSCOPY N/A 4/23/2013    Performed by Jose Steen MD at [facility] (4TH FLR)    TALIA-TRANSFORAMINAL Left 11/3/2016    Performed by Brett [facility] at [facility]    ESOPHAGOGASTRODUODENOSCOPY (EGD) N/A 3/16/2017    Per[facility]ogesh [facility]andes [facility] at [facility]    EYE SURGERY      HAND SURGERY Bilateral 1996 & 1997    due to carpal tunnel     HERNIA REPAIR      HYSTERECTOMY  1983    INJECTION-STEROID-EPIDURAL-TRANSFORAMINAL Left 1/7/2016    Performed by Brett Johnson [facility] at [facility]    Left heart cath Left 12/20/2017    Performed by Chandan Ly MD at [facility] CATH LAB    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    OOPHORECTOMY      removed one    Peripheral Iridotomy both eyes  1994    laser angle correction    THYROIDECTOMY, PARTIAL  2006    to remove two nodules and one-half thyroid         General Precautions: Standard, fall  Orthopedic Precautions: N/A  Braces: N/A    Do you have any cultural, spiritual, Yazdanism conflicts, given your current situation?: None stated     Patient History:  Lives With: alone  Living Arrangements: house  Home Accessibility: stairs to enter home  Home Layout: Able to live on 1st floor  Number of Stairs to Enter Home: 7  Stair Railings at Home: outside, present at both sides  Transportation Available: family or friend will provide  Living Environment Comment: Pt lives alone in single story home with 7 KHOI and (B) HRs. She was reportedly (I) in all aspects and owns a RW and SC but uses neither. Pt has tub shower with grab bars in the area. Has 2 children who she indicated can assist if needed , son ( Yogesh) and daughter ( Maria Esther). Pt was attending exercise  "classes 3 x / wk prior to admission to [facility].    Prior level of function:   Bed Mobility/Transfers: independent  Grooming: independent  Bathing: independent  Upper Body Dressing: independent  Lower Body Dressing: independent  Toileting: independent  Home Management Skills: independent  Homemaking Responsibilities: (Pt was fully independent by her report.)  Driving License: Yes  Mode of Transportation: Car, Family, Friends  Occupation: Retired  Type of Occupation:   Leisure and Hobbies: Exercise classes 3 x per week.     Dominant hand: right    Subjective:  Communicated with nurse prior to session.    Chief Complaint: " Nobody told me what to expect over here. I don't know what kind of things I need over here."  Patient/Family stated goals: Pt wants to return to PLOF    Pain/Comfort  Pain Rating 1: 0/10  Pain Rating Post-Intervention 1: 0/10    Objective:   Patient found with: (no lines)    Cognitive Exam:  Oriented to: Person, Place, Time and Situation  Follows Commands/attention: Follows multistep  commands  Communication: clear/fluent  Memory:  No Deficits noted  Safety awareness/insight to disability: intact  Coping skills/emotional control: Appropriate to situation    Visual/perceptual:  Intact    Physical Exam:  Postural examination/scapula alignment:    -       Rounded shoulders  Skin integrity: Visible skin intact  Edema: None noted (B) UEs    Sensation:      -       Intact  (B) UEs    Upper Extremity Range of Motion:  Right Upper Extremity: WFL  Left Upper Extremity: WFL    Upper Extremity Strength:  Right Upper Extremity: Grossly 4/5 throughout  Left Upper Extremity: Grossly 4/5 throughout   Strength: WFLS    Fine motor coordination:      -       Intact (B) UEs    Gross motor coordination: WFL    Occupational Performance:    Bed Mobility:    · Patient completed Rolling/Turning to Right with supervision  · Patient completed Scooting/Bridging with supervision  · Patient completed " Supine to Sit with supervision     Functional Mobility/Transfers:  · Patient completed Sit <> Stand Transfer with contact guard assistance  with  no assistive device   · Patient completed Bed <> Chair Transfer using Stand Pivot technique with contact guard assistance with no assistive device  · Patient completed  Shower Transfer Stand Pivot technique with contact guard assistance with no AD  · Functional Mobility: Pt ambulated with RW from EOB to shower seat 10ft with SBA/CGA    Activities of Daily Living:  · Feeding:  modified independence no assist  · Grooming: modified independence seated sinkside with CGA/SBA when standing.  · Bathing: stand by assistance and contact guard assistance when standing to wash bottom.  · Upper Body Dressing: supervision set up provided  · Lower Body Dressing: stand by assistance and contact guard assistance when standing to manage clothing over hips. Pt donning pants, brief, socks and shoes.  · Toileting: stand by assistance and contact guard assistance from raise toilet seat.    [facility] 6 Click:  AMPA Total Score: 20    OT Exercises: UE Ergometer performed 15 minutes on UE UBE with Min resistance. UE exercises performed to increase functional endurance and strength.  Strengthening required in order increase independence when performing self care tasks, functional ambulation, W/C propulsion , functional standing activities as well as when performing functional tasks.      Additional Treatment:  Pt edu on safety when performing functional transfers functional ADLS, and functional  standing activities.  - White board updated  - Self care tasks completed-- as noted above   - She performed dynamic sitting activity incorporating reaching in all planes while sitting unsupported EOB and working on self care tasks.    Patient left up in chair with call button in reach and nurse notified    Assessment:  Ewelina Arnold is a 72 y.o. female with a medical diagnosis of MARYAM ( Acute Renal  Insufficiency)   Pt presents with performance deficits of Physical skills including impaired balance, functional mobility, strength, functional endurance, fine and gross motor coordination functional balance, and  functional use of ( B ) LEs . Pt also demonstrating decreased safety and functional performance of Functional Activities and self care activities as well as functional mobility. Pt is motivated and would benefit from OT intervention to further her functional (I)ce and safety.    Pt presented with a low complexity OT evaluation.  Pt required a brief an expanded review of medical history and occupational profile.  Pt demod 1-3 performance deficits (physical, cognitive, or psychosocial) resulting in limitations and engagement restrictions.  Clinical decision making required low analytical complexity with limited treatment options.  Pt with no cormorbidities and required no modification of task/assistance with assessment.      Physical- skills refer to impairments of body structure or functions, balance, mobility; strength, endurance, FMC, GMC, sensation, dexterity, and posture.  Cognitive- skills refer to ability to attend, communicate, perceive, think, understand, problem solve, mentally sequence, learn, and remember resulting in ability to organize occupational performance in timely and safe manner.    Psychosocial- skills refer to interpersonal interactions, habits, routines, and behaviors, active use of coping strategies, and environmental adaptations to appropriately participate in everyday tasks and situations.     Rehab identified problem list/impairments: weakness, impaired endurance, impaired self care skills, impaired functional mobilty, impaired balance, decreased lower extremity function, decreased safety awareness, impaired cardiopulmonary response to activity    Rehab potential is good.    Activity tolerance: Good    Discharge recommendations: home with home health     Barriers to discharge:  Inaccessible home environment, Decreased caregiver support     Equipment recommendations: (TBD)     GOALS:   Multidisciplinary Problems     Occupational Therapy Goals        Problem: Occupational Therapy Goal    Goal Priority Disciplines Outcome Interventions   Occupational Therapy Goal     OT, PT/OT     Description:  Goals to be met by: 11 days     Patient will increase functional independence with ADLs by performing:    UE Dressing with Modified [place].  LE Dressing with Supervision.  Grooming while standing with A/D and Supervision.  Toileting from bedside commode with Modified [place] for hygiene and clothing management.   Bathing from  shower chair/bench with Supervision.  Supine to sit with Modified [place].  Stand pivot transfers with Modified [place] with RW.  Pt will tolerate up to 10 minutes of functional standing activity with A/D to steady and (S).  Pt's caregiver will be competent with assisting with self care tasks and functional mobility.                      PLAN: Patient to be seen 6 x/week, 5 x/week to address the above listed problems via self-care/home management, therapeutic activities, therapeutic exercises  Plan of Care expires: 11/05/18  Plan of Care reviewed with: patient    Tobias Varma OTR/L  10/05/2018

## 2018-10-05 NOTE — ASSESSMENT & PLAN NOTE
Maintain euvolemia by monitoring I/O's and daily weights.  Fluid restriction (1.5 liters/24 hours)  Low Na diet  Monitor for signs of fluid overload: O2 sat<92%, weight gain of >3 lbs, or urinary output <160ml/8hr  Maintain oxygen sats >92% via NC if supplemental oxygen needed.   Current Bumex and Metolazone on hold due to hypotension. Cont to monitor and restart when appropriate. Patient is currently below her dry weight as listed below.   Cont coreg, Imdur to treat      Patient Vitals for the past 72 hrs (Last 3 readings):   Weight   10/05/18 0615 48.4 kg (106 lb 11.2 oz)

## 2018-10-06 PROCEDURE — 97116 GAIT TRAINING THERAPY: CPT

## 2018-10-06 PROCEDURE — 97110 THERAPEUTIC EXERCISES: CPT

## 2018-10-06 PROCEDURE — 25000003 PHARM REV CODE 250: Performed by: HOSPITALIST

## 2018-10-06 PROCEDURE — 97535 SELF CARE MNGMENT TRAINING: CPT

## 2018-10-06 PROCEDURE — 11000004 HC SNF PRIVATE

## 2018-10-06 PROCEDURE — 63600175 PHARM REV CODE 636 W HCPCS: Performed by: HOSPITALIST

## 2018-10-06 PROCEDURE — 97530 THERAPEUTIC ACTIVITIES: CPT

## 2018-10-06 RX ADMIN — CITALOPRAM HYDROBROMIDE 30 MG: 20 TABLET ORAL at 09:10

## 2018-10-06 RX ADMIN — ISOSORBIDE MONONITRATE 60 MG: 30 TABLET, EXTENDED RELEASE ORAL at 09:10

## 2018-10-06 RX ADMIN — SODIUM BICARBONATE 650 MG TABLET 650 MG: at 03:10

## 2018-10-06 RX ADMIN — HEPARIN SODIUM 5000 UNITS: 5000 INJECTION, SOLUTION INTRAVENOUS; SUBCUTANEOUS at 02:10

## 2018-10-06 RX ADMIN — DOXAZOSIN 1 MG: 1 TABLET ORAL at 09:10

## 2018-10-06 RX ADMIN — NIFEDIPINE 60 MG: 30 TABLET, FILM COATED, EXTENDED RELEASE ORAL at 09:10

## 2018-10-06 RX ADMIN — HEPARIN SODIUM 5000 UNITS: 5000 INJECTION, SOLUTION INTRAVENOUS; SUBCUTANEOUS at 05:10

## 2018-10-06 RX ADMIN — LATANOPROST 1 DROP: 50 SOLUTION OPHTHALMIC at 09:10

## 2018-10-06 RX ADMIN — ATORVASTATIN CALCIUM 80 MG: 20 TABLET, FILM COATED ORAL at 09:10

## 2018-10-06 RX ADMIN — CARVEDILOL 6.25 MG: 6.25 TABLET, FILM COATED ORAL at 09:10

## 2018-10-06 RX ADMIN — LEVOTHYROXINE SODIUM 75 MCG: 75 TABLET ORAL at 05:10

## 2018-10-06 RX ADMIN — HEPARIN SODIUM 5000 UNITS: 5000 INJECTION, SOLUTION INTRAVENOUS; SUBCUTANEOUS at 09:10

## 2018-10-06 RX ADMIN — PANTOPRAZOLE SODIUM 40 MG: 40 TABLET, DELAYED RELEASE ORAL at 09:10

## 2018-10-06 RX ADMIN — HYDROXYCHLOROQUINE SULFATE 200 MG: 200 TABLET, FILM COATED ORAL at 10:10

## 2018-10-06 RX ADMIN — SODIUM BICARBONATE 650 MG TABLET 650 MG: at 09:10

## 2018-10-06 RX ADMIN — ERGOCALCIFEROL 50000 UNITS: 1.25 CAPSULE ORAL at 09:10

## 2018-10-06 RX ADMIN — ASPIRIN 81 MG: 81 TABLET, COATED ORAL at 09:10

## 2018-10-06 RX ADMIN — HYDROXYCHLOROQUINE SULFATE 200 MG: 200 TABLET, FILM COATED ORAL at 09:10

## 2018-10-06 NOTE — PT/OT/SLP PROGRESS
Physical Therapy  Treatment    Ewelina Arnold   MRN: [id number]   Admitting Diagnosis: Debility    PT Received On: 10/06/18  Total Time (min): 45       Billable Minutes:  Gait Training 10, Therapeutic Activity 13 and Therapeutic Exercise 22    Treatment Type: Treatment  PT/PTA: PTA     PTA Visit Number: 1       General Precautions: Standard, fall  Orthopedic Precautions: N/A   Braces: N/A    Subjective:  Communicated with nursing prior to session.  Pt agreed to work with therapy.     Pain/Comfort  Pain Rating 1: 0/10  Pain Rating Post-Intervention 1: 0/10    Objective:   Patient found with: (seated bedside chair)     AM-PAC 6 CLICK MOBILITY  Total Score:18    Transfers:  Sit<>Stand: SBA w/ RW (multiple trials)   Stand Pivot Transfer: mat to w/c w/ RW and SBA    Gait:  Amb ~140 feet w/ RW and CG-SBA. No SOB or LOB noted.      Therex:  -Seated B LE therex x20 reps:    -AP   -LAQ   -Hip Flexion    Balance:  -Pt stood w/ RW and CG-SBA for pt safety x4 trials for ~40 seconds each trial while placing/removing rings from ring tree.     Additional Treatment:  -Recumbent Stepper x10 min L4.    Patient left up in chair with call button in reach and nursing notified.    Assessment:  Ewelina Arnold is a 72 y.o. female with a medical diagnosis of Debility.  Pt tolerated treatment well, and will continue to benefit from PT services at this time. Continue with PT POC as indicated.    Rehab identified problem list/impairments: weakness, impaired endurance, impaired functional mobilty, gait instability, impaired balance, decreased lower extremity function, decreased coordination, edema    Rehab potential is good.    Activity tolerance: Good    Discharge recommendations: home with home health     Barriers to discharge: Inaccessible home environment, Decreased caregiver support    Equipment recommendations: shower chair     GOALS:   Multidisciplinary Problems     Physical Therapy Goals        Problem: Physical Therapy Goal    Goal Priority  Disciplines Outcome Goal Variances Interventions   Physical Therapy Goal     PT, PT/OT Ongoing (interventions implemented as appropriate)     Description:  Goals to be met by: 9 days     Patient will increase functional independence with mobility by performin. Supine to sit with Modified [place]  2. Sit to supine with Modified [place]  3. Sit to stand transfer with Supervision using the least restrictive device.  4. Bed to chair transfer with Supervision using the least restrictive device.  5. Gait  x 80 feet with Supervision using the least restrictive device.  6. Ascend/descend 8 steps with bilateral Handrails Stand-by Assistance.  7. Ascend/Descend 4 inch curb step with Supervision using the least restrictive device.  8. Stand for 3 minutes with Stand-by Assistance using the least restrictive device.  9. Lower extremity exercise program x20 reps per handout, with assistance as needed                      PLAN:    Patient to be seen 5 x/week  to address the above listed problems via gait training, therapeutic activities, therapeutic exercises  Plan of Care expires: 18  Plan of Care reviewed with: patient    Abbie London, PTA  10/06/2018

## 2018-10-06 NOTE — PLAN OF CARE
Problem: Occupational Therapy Goal  Goal: Occupational Therapy Goal  Goals to be met by: 11 days     Patient will increase functional independence with ADLs by performing:    UE Dressing with Modified [place].  LE Dressing with Supervision.  Grooming while standing with A/D and Supervision.  Toileting from bedside commode with Modified [place] for hygiene and clothing management.   Bathing from  shower chair/bench with Supervision.  Supine to sit with Modified [place].  Stand pivot transfers with Modified [place] with RW.  Pt will tolerate up to 10 minutes of functional standing activity with A/D to steady and (S).  Pt's caregiver will be competent with assisting with self care tasks and functional mobility.     Pt. Tolerated session fairly

## 2018-10-06 NOTE — PLAN OF CARE
Problem: Physical Therapy Goal  Goal: Physical Therapy Goal  Goals to be met by: 9 days     Patient will increase functional independence with mobility by performin. Supine to sit with Modified [place]  2. Sit to supine with Modified [place]  3. Sit to stand transfer with Supervision using the least restrictive device.  4. Bed to chair transfer with Supervision using the least restrictive device.  5. Gait  x 80 feet with Supervision using the least restrictive device.  6. Ascend/descend 8 steps with bilateral Handrails Stand-by Assistance.  7. Ascend/Descend 4 inch curb step with Supervision using the least restrictive device.  8. Stand for 3 minutes with Stand-by Assistance using the least restrictive device.  9. Lower extremity exercise program x20 reps per handout, with assistance as needed     Goals remain appropriate. Continue with PT POC as indicated.

## 2018-10-06 NOTE — PT/OT/SLP PROGRESS
Occupational Therapy  Treatment    Ewelina Arnold   MRN: [id number]   Admitting Diagnosis: Debility    OT Date of Treatment: 10/06/18  Total Time (min): 40 min    Billable Minutes:  Self Care/Home Management 17 and Therapeutic Exercise 23    General Precautions: Standard, fall  Orthopedic Precautions: N/A  Braces: N/A    Do you have any cultural, spiritual, Pentecostalism conflicts, given your current situation?: None stated    Subjective:  Communicated with nurse prior to session.  Pt. Reported she was cold    Pain/Comfort  Pain Rating 1: 0/10  Pain Rating Post-Intervention 1: 0/10    Objective:  Patient found with: (seated in bedside chair)    Occupational Performance:    Bed Mobility:    · Patient completed Sit to Supine with supervision     Functional Mobility/Transfers:  · Patient completed Sit <> Stand Transfer with stand by assistance  with  rolling walker   · Patient completed Toilet Transfer Stand Pivot technique with stand by assistance with  rolling walker  · Functional Mobility: Pt. Ambulated to bathroom with RW and SBA    Activities of Daily Living:  · Grooming: stand by assistance in stand at sink to wwash hands  · Toileting: stand by assistance with use of grab bar when standing to manage clothing up/down    AMPA 6 Click:  [facility] Total Score: 20    OT Exercises: AROM with 2 # weight x 2 sets 10 reps in each UE for elbow flex/ext as well as shoulder IR/ER   2 # weight in isolation for each UE x 1 set 10 reps for chest presses, shoulder flexion and shoulder abd.   UE ergometer x 10 minutes with min resistance  Additional Treatment:  Pt. educated on safety with toileting, transfers and mobility  Pt. Educated on importance of OOB   pt  Educated on role of OT and pOC  02 sats taken and noted to be 100%    Patient left supine with call button in reach    ASSESSMENT:  Ewelina Arnold is a 72 y.o. female with a medical diagnosis of Debility and presents with deficits in self-care skills, functional mobility and AADL  tasks.  Pt. Noted to fatigue easily on this date and with c/o of being cold. . Pt. Would benefit from continued OT services to maximize safety with ADL tasks.     Rehab identified problem list/impairments: weakness, impaired endurance, impaired self care skills, impaired functional mobilty, impaired balance, decreased lower extremity function, decreased safety awareness, impaired cardiopulmonary response to activity    Rehab potential is good    Activity tolerance: Fair    Discharge recommendations: home with home health     Barriers to discharge: Inaccessible home environment, Decreased caregiver support     Equipment recommendations: (TBD)     GOALS:   Multidisciplinary Problems     Occupational Therapy Goals        Problem: Occupational Therapy Goal    Goal Priority Disciplines Outcome Interventions   Occupational Therapy Goal     OT, PT/OT     Description:  Goals to be met by: 11 days     Patient will increase functional independence with ADLs by performing:    UE Dressing with Modified [place].  LE Dressing with Supervision.  Grooming while standing with A/D and Supervision.  Toileting from bedside commode with Modified [place] for hygiene and clothing management.   Bathing from  shower chair/bench with Supervision.  Supine to sit with Modified [place].  Stand pivot transfers with Modified [place] with RW.  Pt will tolerate up to 10 minutes of functional standing activity with A/D to steady and (S).  Pt's caregiver will be competent with assisting with self care tasks and functional mobility.                      Plan:  Patient to be seen 6 x/week, 5 x/week to address the above listed problems via self-care/home management, therapeutic activities, therapeutic exercises  Plan of Care expires: 11/05/18  Plan of Care reviewed with: patient    BRANDEE Pruitt  10/06/2018

## 2018-10-06 NOTE — PLAN OF CARE
Problem: Patient Care Overview  Goal: Plan of Care Review  Outcome: Ongoing (interventions implemented as appropriate)   10/06/18 0116   Coping/Psychosocial   Plan Of Care Reviewed With patient       Problem: Fall Risk (Adult)  Intervention: Reduce Risk/Promote Restraint Free Environment   10/06/18 0116   Safety Interventions   Environmental Safety Modification assistive device/personal items within reach;clutter free environment maintained;mobility aid in reach;lighting adjusted;room organization consistent     Intervention: Safety Promotion/Fall Prevention   10/06/18 0116   Safety Interventions   Safety Promotion/Fall Prevention assistive device/personal item within reach;Fall Risk reviewed with patient/family;Fall Risk signage in place;in recliner, wheels locked;lighting adjusted;nonskid shoes/socks when out of bed;upper side rails raised x 2, lower siderails raised x 1 (Peds only)       Goal: Identify Related Risk Factors and Signs and Symptoms  Related risk factors and signs and symptoms are identified upon initiation of Human Response Clinical Practice Guideline (CPG)  Outcome: Ongoing (interventions implemented as appropriate)   10/06/18 0116   Fall Risk   Related Risk Factors (Fall Risk) fatigue/slow reaction;homeostatic imbalance   Signs and Symptoms (Fall Risk) presence of risk factors

## 2018-10-07 PROCEDURE — 11000004 HC SNF PRIVATE

## 2018-10-07 PROCEDURE — 25000003 PHARM REV CODE 250: Performed by: HOSPITALIST

## 2018-10-07 RX ADMIN — SODIUM BICARBONATE 650 MG TABLET 650 MG: at 09:10

## 2018-10-07 RX ADMIN — SODIUM BICARBONATE 650 MG TABLET 650 MG: at 02:10

## 2018-10-07 RX ADMIN — PANTOPRAZOLE SODIUM 40 MG: 40 TABLET, DELAYED RELEASE ORAL at 09:10

## 2018-10-07 RX ADMIN — NIFEDIPINE 60 MG: 30 TABLET, FILM COATED, EXTENDED RELEASE ORAL at 09:10

## 2018-10-07 RX ADMIN — HYDROXYCHLOROQUINE SULFATE 200 MG: 200 TABLET, FILM COATED ORAL at 09:10

## 2018-10-07 RX ADMIN — LEVOTHYROXINE SODIUM 75 MCG: 75 TABLET ORAL at 06:10

## 2018-10-07 RX ADMIN — ASPIRIN 81 MG: 81 TABLET, COATED ORAL at 09:10

## 2018-10-07 RX ADMIN — ISOSORBIDE MONONITRATE 60 MG: 30 TABLET, EXTENDED RELEASE ORAL at 09:10

## 2018-10-07 RX ADMIN — CITALOPRAM HYDROBROMIDE 30 MG: 20 TABLET ORAL at 09:10

## 2018-10-07 RX ADMIN — CARVEDILOL 6.25 MG: 6.25 TABLET, FILM COATED ORAL at 09:10

## 2018-10-07 RX ADMIN — LATANOPROST 1 DROP: 50 SOLUTION OPHTHALMIC at 10:10

## 2018-10-07 RX ADMIN — ATORVASTATIN CALCIUM 80 MG: 20 TABLET, FILM COATED ORAL at 09:10

## 2018-10-07 RX ADMIN — DOXAZOSIN 1 MG: 1 TABLET ORAL at 09:10

## 2018-10-07 NOTE — PROGRESS NOTES
Notified by staff nurse Kaylan that pt will like to speak with charge nurse. Processed to patient room and was told by the patient that she need to eat all her food, because she because needs to gain weight. She want her food to be prepared in certain way. Explain to the patient that l will get the dietary supervision to come and talk to her.  Notified the dietary supervision that the pt request to speak to him.

## 2018-10-08 ENCOUNTER — TELEPHONE (OUTPATIENT)
Dept: NEPHROLOGY | Facility: CLINIC | Age: 73
End: 2018-10-08

## 2018-10-08 LAB
ANION GAP SERPL CALC-SCNC: 8 MMOL/L
BASOPHILS # BLD AUTO: 0.02 K/UL
BASOPHILS NFR BLD: 0.3 %
BUN SERPL-MCNC: 82 MG/DL
CALCIUM SERPL-MCNC: 8.5 MG/DL
CHLORIDE SERPL-SCNC: 103 MMOL/L
CO2 SERPL-SCNC: 30 MMOL/L
CREAT SERPL-MCNC: 3 MG/DL
DIFFERENTIAL METHOD: ABNORMAL
EOSINOPHIL # BLD AUTO: 0 K/UL
EOSINOPHIL NFR BLD: 0 %
ERYTHROCYTE [DISTWIDTH] IN BLOOD BY AUTOMATED COUNT: 13.8 %
EST. GFR  (AFRICAN AMERICAN): 17.2 ML/MIN/1.73 M^2
EST. GFR  (NON AFRICAN AMERICAN): 14.9 ML/MIN/1.73 M^2
GLUCOSE SERPL-MCNC: 58 MG/DL
HCT VFR BLD AUTO: 27.8 %
HGB BLD-MCNC: 8.7 G/DL
IMM GRANULOCYTES # BLD AUTO: 0.03 K/UL
IMM GRANULOCYTES NFR BLD AUTO: 0.5 %
LYMPHOCYTES # BLD AUTO: 1.3 K/UL
LYMPHOCYTES NFR BLD: 22 %
MAGNESIUM SERPL-MCNC: 1.6 MG/DL
MCH RBC QN AUTO: 26.9 PG
MCHC RBC AUTO-ENTMCNC: 31.3 G/DL
MCV RBC AUTO: 86 FL
MONOCYTES # BLD AUTO: 0.9 K/UL
MONOCYTES NFR BLD: 15.4 %
NEUTROPHILS # BLD AUTO: 3.6 K/UL
NEUTROPHILS NFR BLD: 61.8 %
NRBC BLD-RTO: 0 /100 WBC
PHOSPHATE SERPL-MCNC: 3.3 MG/DL
PLATELET # BLD AUTO: 366 K/UL
PMV BLD AUTO: 9.9 FL
POTASSIUM SERPL-SCNC: 3.8 MMOL/L
RBC # BLD AUTO: 3.23 M/UL
SODIUM SERPL-SCNC: 141 MMOL/L
WBC # BLD AUTO: 5.86 K/UL

## 2018-10-08 PROCEDURE — 85025 COMPLETE CBC W/AUTO DIFF WBC: CPT

## 2018-10-08 PROCEDURE — 97116 GAIT TRAINING THERAPY: CPT

## 2018-10-08 PROCEDURE — 97530 THERAPEUTIC ACTIVITIES: CPT

## 2018-10-08 PROCEDURE — 97110 THERAPEUTIC EXERCISES: CPT

## 2018-10-08 PROCEDURE — 25000003 PHARM REV CODE 250: Performed by: NURSE PRACTITIONER

## 2018-10-08 PROCEDURE — 97535 SELF CARE MNGMENT TRAINING: CPT

## 2018-10-08 PROCEDURE — 80048 BASIC METABOLIC PNL TOTAL CA: CPT

## 2018-10-08 PROCEDURE — 25000003 PHARM REV CODE 250: Performed by: HOSPITALIST

## 2018-10-08 PROCEDURE — 83735 ASSAY OF MAGNESIUM: CPT

## 2018-10-08 PROCEDURE — 84100 ASSAY OF PHOSPHORUS: CPT

## 2018-10-08 PROCEDURE — 99309 SBSQ NF CARE MODERATE MDM 30: CPT | Mod: ,,, | Performed by: NURSE PRACTITIONER

## 2018-10-08 PROCEDURE — 36415 COLL VENOUS BLD VENIPUNCTURE: CPT

## 2018-10-08 PROCEDURE — 63600175 PHARM REV CODE 636 W HCPCS: Performed by: HOSPITALIST

## 2018-10-08 PROCEDURE — 97803 MED NUTRITION INDIV SUBSEQ: CPT

## 2018-10-08 PROCEDURE — 11000004 HC SNF PRIVATE

## 2018-10-08 RX ORDER — SODIUM BICARBONATE 650 MG/1
650 TABLET ORAL 2 TIMES DAILY
Status: DISCONTINUED | OUTPATIENT
Start: 2018-10-08 | End: 2018-10-12 | Stop reason: HOSPADM

## 2018-10-08 RX ADMIN — DOXAZOSIN 1 MG: 1 TABLET ORAL at 10:10

## 2018-10-08 RX ADMIN — LEVOTHYROXINE SODIUM 75 MCG: 75 TABLET ORAL at 06:10

## 2018-10-08 RX ADMIN — PANTOPRAZOLE SODIUM 40 MG: 40 TABLET, DELAYED RELEASE ORAL at 12:10

## 2018-10-08 RX ADMIN — ASPIRIN 81 MG: 81 TABLET, COATED ORAL at 12:10

## 2018-10-08 RX ADMIN — ISOSORBIDE MONONITRATE 60 MG: 30 TABLET, EXTENDED RELEASE ORAL at 12:10

## 2018-10-08 RX ADMIN — HEPARIN SODIUM 5000 UNITS: 5000 INJECTION, SOLUTION INTRAVENOUS; SUBCUTANEOUS at 10:10

## 2018-10-08 RX ADMIN — CALCIUM CARBONATE (ANTACID) CHEW TAB 500 MG 500 MG: 500 CHEW TAB at 03:10

## 2018-10-08 RX ADMIN — NIFEDIPINE 60 MG: 30 TABLET, FILM COATED, EXTENDED RELEASE ORAL at 12:10

## 2018-10-08 RX ADMIN — SENNOSIDES AND DOCUSATE SODIUM 1 TABLET: 8.6; 5 TABLET ORAL at 12:10

## 2018-10-08 RX ADMIN — CARVEDILOL 6.25 MG: 6.25 TABLET, FILM COATED ORAL at 10:10

## 2018-10-08 RX ADMIN — NIFEDIPINE 60 MG: 30 TABLET, FILM COATED, EXTENDED RELEASE ORAL at 10:10

## 2018-10-08 RX ADMIN — HYDROXYCHLOROQUINE SULFATE 200 MG: 200 TABLET, FILM COATED ORAL at 12:10

## 2018-10-08 RX ADMIN — HYDROXYCHLOROQUINE SULFATE 200 MG: 200 TABLET, FILM COATED ORAL at 10:10

## 2018-10-08 RX ADMIN — HEPARIN SODIUM 5000 UNITS: 5000 INJECTION, SOLUTION INTRAVENOUS; SUBCUTANEOUS at 06:10

## 2018-10-08 RX ADMIN — CITALOPRAM HYDROBROMIDE 30 MG: 20 TABLET ORAL at 12:10

## 2018-10-08 RX ADMIN — ATORVASTATIN CALCIUM 80 MG: 20 TABLET, FILM COATED ORAL at 12:10

## 2018-10-08 RX ADMIN — LATANOPROST 1 DROP: 50 SOLUTION OPHTHALMIC at 12:10

## 2018-10-08 RX ADMIN — CARVEDILOL 6.25 MG: 6.25 TABLET, FILM COATED ORAL at 12:10

## 2018-10-08 RX ADMIN — SODIUM BICARBONATE 650 MG TABLET 650 MG: at 10:10

## 2018-10-08 RX ADMIN — SODIUM BICARBONATE 650 MG TABLET 650 MG: at 12:10

## 2018-10-08 NOTE — SUBJECTIVE & OBJECTIVE
Interval History:   10/8/18  Patient seen at bedside, she is requesting to speak with charge nurse but would not elaborate why.  She is disgusted with the food being served.  She reports she gets SOB with activity at times but has improved since admitted to hospital.  She currently has no pain.  She reports she is voiding and having BM without issues.  Plans to discharge home, reports her kids check in on her.        Review of Systems   Constitutional: Negative for appetite change, fatigue and fever.   Respiratory: Negative for cough and shortness of breath.    Cardiovascular: Negative for chest pain, palpitations and leg swelling.   Gastrointestinal: Negative for abdominal pain, constipation, diarrhea, nausea and vomiting.   Endocrine: Negative for polydipsia, polyphagia and polyuria.   Genitourinary: Negative for dysuria and frequency.   Musculoskeletal: Positive for arthralgias.   Skin: Negative for rash.   Psychiatric/Behavioral: Negative for confusion and hallucinations.     Objective:     Vital Signs (Most Recent):  Temp: 98 °F (36.7 °C) (10/08/18 0859)  Pulse: 62 (10/08/18 0859)  Resp: 18 (10/08/18 0859)  BP: (!) 162/71 (10/08/18 0859)  SpO2: 98 % (10/08/18 0859) Vital Signs (24h Range):  Temp:  [98 °F (36.7 °C)-98.3 °F (36.8 °C)] 98 °F (36.7 °C)  Pulse:  [62-65] 62  Resp:  [16-18] 18  SpO2:  [98 %-99 %] 98 %  BP: (149-162)/(64-72) 162/71     Weight: 48.8 kg (107 lb 9.4 oz)  Body mass index is 18.47 kg/m².    Intake/Output Summary (Last 24 hours) at 10/8/2018 1516  Last data filed at 10/8/2018 1200  Gross per 24 hour   Intake 240 ml   Output --   Net 240 ml      Physical Exam   Constitutional: She is oriented to person, place, and time. She appears well-developed and well-nourished.   Cardiovascular: Normal rate, regular rhythm and intact distal pulses.   Murmur heard.  Pulmonary/Chest: Effort normal and breath sounds normal. No stridor. No respiratory distress.   Abdominal: Soft. Normal appearance and bowel  sounds are normal. She exhibits no distension. There is no tenderness.   Musculoskeletal: Normal range of motion. She exhibits no edema.   Neurological: She is alert and oriented to person, place, and time. She has normal strength.   Skin: Skin is warm, dry and intact. Capillary refill takes less than 2 seconds. No erythema.   Psychiatric: She has a normal mood and affect.       Significant Labs:   BMP:   Recent Labs   Lab  10/08/18   0600   GLU  58*   NA  141   K  3.8   CL  103   CO2  30*   BUN  82*   CREATININE  3.0*   CALCIUM  8.5*   MG  1.6     CBC:   Recent Labs   Lab  10/08/18   0600   WBC  5.86   HGB  8.7*   HCT  27.8*   PLT  366*       Significant Imaging: n/a

## 2018-10-08 NOTE — PLAN OF CARE
Problem: Occupational Therapy Goal  Goal: Occupational Therapy Goal  Goals to be met by: 11 days     Patient will increase functional independence with ADLs by performing:    UE Dressing with Modified [place].  LE Dressing with Supervision.  Grooming while standing with A/D and Supervision.  Toileting from bedside commode with Modified [place] for hygiene and clothing management.   Bathing from  shower chair/bench with Supervision.  Supine to sit with Modified [place].  Stand pivot transfers with Modified [place] with RW.  Pt will tolerate up to 10 minutes of functional standing activity with A/D to steady and (S).  Pt's caregiver will be competent with assisting with self care tasks and functional mobility.     Outcome: Ongoing (interventions implemented as appropriate)  Progressing. BRANDEE Berg  10/8/2018

## 2018-10-08 NOTE — ASSESSMENT & PLAN NOTE
Cont Sodium bicarb to treat    10/8/18  CO2 at 30 will reduce sodium bicarb to bid dosing and monitor.

## 2018-10-08 NOTE — ASSESSMENT & PLAN NOTE
Chronic and stable  Cont Citalopram to treat    10/8/18  Chronic, continue Celexa and follow up with treating provider.

## 2018-10-08 NOTE — PT/OT/SLP PROGRESS
Occupational Therapy  Treatment    Ewelina Arnold   MRN: [id number]   Admitting Diagnosis: Debility    OT Date of Treatment: 10/08/18  Total Time (min): 40 min    Billable Minutes:  Self Care/Home Management 15, Therapeutic Activity 13 and Therapeutic Exercise 12    General Precautions: Standard, fall  Orthopedic Precautions: N/A  Braces: N/A    Do you have any cultural, spiritual, Alevism conflicts, given your current situation?: None stated    Subjective:  Communicated with pt prior to session.  Pt agreeable    Pain/Comfort  Pain Rating 1: 0/10  Pain Rating Post-Intervention 1: 0/10    Objective:  Patient found with: (supine in bed)    Occupational Performance:    Bed Mobility:    · Patient completed Scooting/Bridging with modified independence  · Patient completed Supine to Sit with modified independence     Functional Mobility/Transfers:  · Patient completed Sit <> Stand Transfer with modified independence  with  rolling walker   · Patient completed Bed <> Chair Transfer using Stand Pivot technique with stand by assistance with rolling walker  · Patient completed Toilet Transfer Stand Pivot technique with stand by assistance with  rolling walker  · Functional Mobility: Pt ambulated in room and bathroom with RW and SBA--cues for safety and use of RW    Activities of Daily Living:  · Feeding:  independence in bedside chair  · Grooming: modified independence standing at sink  · Bathing: supervision sit/stand at sink  · Upper Body Dressing: setup     · Lower Body Dressing: setup  to tori pants, underwear, compression stockings and shoes  · Toileting: modified independence on toilet    AMPA 6 Click:  AMPA Total Score: 20    OT Exercises: UE Ergometer 12 min to increase functional endurance and strength for ADLs    Additional Treatment:  Pt seen for  Functional mobility in the room, dressing, bathing, toileting and grooming , safety, use of RW, UE there ex and d/c planning    Patient left up in chair with call button  in reach and eating breakfast    ASSESSMENT:  Ewelina Arnold is a 72 y.o. female with a medical diagnosis of Debility . Pt tolerated tx and is progressing toward goals. Pt is cooperative and motivated.  Pt cont to benefit from OT to address limitations and increase functional indep and safety to return  To PLOF.    Rehab identified problem list/impairments: weakness, impaired endurance, impaired self care skills, impaired functional mobilty, impaired balance, decreased lower extremity function, decreased safety awareness, impaired cardiopulmonary response to activity    Rehab potential is good    Activity tolerance: Good    Discharge recommendations: home with home health     Barriers to discharge: Inaccessible home environment, Decreased caregiver support     Equipment recommendations: (TBD)     GOALS:   Multidisciplinary Problems     Occupational Therapy Goals        Problem: Occupational Therapy Goal    Goal Priority Disciplines Outcome Interventions   Occupational Therapy Goal     OT, PT/OT Ongoing (interventions implemented as appropriate)    Description:  Goals to be met by: 11 days     Patient will increase functional independence with ADLs by performing:    UE Dressing with Modified [place].  LE Dressing with Supervision.  Grooming while standing with A/D and Supervision.  Toileting from bedside commode with Modified [place] for hygiene and clothing management.   Bathing from  shower chair/bench with Supervision.  Supine to sit with Modified [place].  Stand pivot transfers with Modified [place] with RW.  Pt will tolerate up to 10 minutes of functional standing activity with A/D to steady and (S).  Pt's caregiver will be competent with assisting with self care tasks and functional mobility.                      Plan:  Patient to be seen 6 x/week, 5 x/week to address the above listed problems via self-care/home management, therapeutic activities, therapeutic exercises  Plan of Care expires:  11/05/18  Plan of Care reviewed with: patient    BRANDEE Berg  10/08/2018

## 2018-10-08 NOTE — PLAN OF CARE
Problem: Patient Care Overview  Goal: Plan of Care Review  Outcome: Ongoing (interventions implemented as appropriate)  Malnutrition     Malnutrition in the context of Chronic Illness/Injury     Related to (etiology):  Poor appetite, limited food choices on special diet as perceived by patient     Signs and Symptoms (as evidenced by):  Energy Intake: <75% of estimated energy requirement for > one month   Body Fat Depletion: moderate depletion of orbitals, triceps and thoracic and lumbar region   Muscle Mass Depletion: moderate depletion of temples, clavicle region, scapular region, interosseous muscle and lower extremities   Weight Loss: 7% x 90 days ( weight 59.5 kg 3/31/17 now 48.4 kg)     Interventions/Recommendations (treatment strategy):  Nutrition Education  Renal diet, 2 gm Na, 1.2L fluid restriction, protein smoothie BID; fruit with meals; Lactaid milk 1/2c daily as requested  Consider liberalizing diet for dc     Nutrition Diagnosis Status:   Continues

## 2018-10-08 NOTE — ASSESSMENT & PLAN NOTE
Continue home hydroxychloroquine 200mg bid    10/8/18  Chronic, continue plaquenil as ordered and follow up with Rheumatology.

## 2018-10-08 NOTE — HOSPITAL COURSE
10/8/18  Patient seen at bedside, she is requesting to speak with charge nurse but would not elaborate why.  She is disgusted with the food being served.  She reports she gets SOB with activity at times but has improved since admitted to hospital.  She currently has no pain.  She reports she is voiding and having BM without issues.  Plans to discharge home, reports her kids check in on her.    10/12/18  Patient seen prior to discharge, she has no complaints of CP.  She occasionally gets PEDRO but this has improved since admission.  She is aware she needs to follow up with Nephrology, Heart Transplant, PCP, and also is going to see palliative care after discharge.  She will be referred to outpatient case management as well.  She was told to monitor her daily weights and blood pressure and to take her lasix as indicated for weight gain.  She actively participated in her therapy sessions and therapy has recommended home with home health.

## 2018-10-08 NOTE — ASSESSMENT & PLAN NOTE
Cont home Latanoprost to treat.     10/8/18  No acute issues.  Continue Latanoprost and follow up with treating provider.

## 2018-10-08 NOTE — PROGRESS NOTES
[facility]  Department of [facility] Medicine  Progress Note    Patient Name: Ewelina Arnold  MRN: [id number]  Code Status: Full Code  Admission Date: 10/4/2018  Length of Stay: 4 days  Attending Physician: Luis Smith MD  Primary Care Provider: Amarilys Johns MD    Subjective:     Principal Problem:Debility    HPI:  Chief Complaint/Reason for Admission: Debility    History of Present Illness:  Patient is a 72 y.o. female who has a past medical history of chronic diastolic congestive heart failure, glaucoma, Anemia, Cataract, Chronic kidney disease, Chronic sciatica of left side, Coronary artery disease, Depression, GERD, History of colon cancer, Hyperaldosteronism, Hyperlipidemia, Hypertension, Hypothyroidism, systemic lupus erythematosus, Osteoarthritis of cervical spine,peripheral artery disease, and renal artery stenosis presented with hypoxemic respiratory failure from acute decompensated heart failure. Patient diureses with lasix drip and then subsequently transitioned to oral Bumex and metolazone. Patient became hypotensive and diuretics put on hold. Nephrology consulted for patients CKD and stated patient will likely need initiation of HD in near future. Patient has been working with PT/OT who recommend SNF for further balance/mobility training. Patient currently with no complaints. She denies any fluid overload or edema. She denies any chest pain or dyspnea. She lives home along and is eager to discharge home.    Interval History:   10/8/18  Patient seen at bedside, she is requesting to speak with charge nurse but would not elaborate why.  She is disgusted with the food being served.  She reports she gets SOB with activity at times but has improved since admitted to hospital.  She currently has no pain.  She reports she is voiding and having BM without issues.  Plans to discharge home, reports her kids check in on her.        Review of Systems   Constitutional: Negative for appetite  change, fatigue and fever.   Respiratory: Negative for cough and shortness of breath.    Cardiovascular: Negative for chest pain, palpitations and leg swelling.   Gastrointestinal: Negative for abdominal pain, constipation, diarrhea, nausea and vomiting.   Endocrine: Negative for polydipsia, polyphagia and polyuria.   Genitourinary: Negative for dysuria and frequency.   Musculoskeletal: Positive for arthralgias.   Skin: Negative for rash.   Psychiatric/Behavioral: Negative for confusion and hallucinations.     Objective:     Vital Signs (Most Recent):  Temp: 98 °F (36.7 °C) (10/08/18 0859)  Pulse: 62 (10/08/18 0859)  Resp: 18 (10/08/18 0859)  BP: (!) 162/71 (10/08/18 0859)  SpO2: 98 % (10/08/18 0859) Vital Signs (24h Range):  Temp:  [98 °F (36.7 °C)-98.3 °F (36.8 °C)] 98 °F (36.7 °C)  Pulse:  [62-65] 62  Resp:  [16-18] 18  SpO2:  [98 %-99 %] 98 %  BP: (149-162)/(64-72) 162/71     Weight: 48.8 kg (107 lb 9.4 oz)  Body mass index is 18.47 kg/m².    Intake/Output Summary (Last 24 hours) at 10/8/2018 1516  Last data filed at 10/8/2018 1200  Gross per 24 hour   Intake 240 ml   Output --   Net 240 ml      Physical Exam   Constitutional: She is oriented to person, place, and time. She appears well-developed and well-nourished.   Cardiovascular: Normal rate, regular rhythm and intact distal pulses.   Murmur heard.  Pulmonary/Chest: Effort normal and breath sounds normal. No stridor. No respiratory distress.   Abdominal: Soft. Normal appearance and bowel sounds are normal. She exhibits no distension. There is no tenderness.   Musculoskeletal: Normal range of motion. She exhibits no edema.   Neurological: She is alert and oriented to person, place, and time. She has normal strength.   Skin: Skin is warm, dry and intact. Capillary refill takes less than 2 seconds. No erythema.   Psychiatric: She has a normal mood and affect.       Significant Labs:   BMP:   Recent Labs   Lab  10/08/18   0600   GLU  58*   NA  141   K  3.8   CL   103   CO2  30*   BUN  82*   CREATININE  3.0*   CALCIUM  8.5*   MG  1.6     CBC:   Recent Labs   Lab  10/08/18   0600   WBC  5.86   HGB  8.7*   HCT  27.8*   PLT  366*       Significant Imaging: n/a    Assessment/Plan:      * Debility    Cont with PT/OT for gait training and strengthening and restoration of ADL's   Encourage mobility, OOB in chair, and early ambulation as appropriate  Fall precautions   Monitor for bowel and bladder dysfunction  Monitor for and prevent skin breakdown and pressure ulcers  Cont DVT prophylaxis with heparin   Anticoagulants   Medication Route Frequency    heparin (porcine) injection 5,000 Units Subcutaneous Q8H     10/8/18  Continue therapy to improve functional strength.  Heparin for dvt ppx as ordered.  Plans to discharge home with family help.        Vitamin D deficiency    Cont Vit D supplement to treat     10/8/18  Continue vitamin d as ordered.        Metabolic acidosis, normal anion gap (NAG)    Cont Sodium bicarb to treat    10/8/18  CO2 at 30 will reduce sodium bicarb to bid dosing and monitor.        Anemia in stage 4 chronic kidney disease    Lab Results   Component Value Date    HGB 8.7 (L) 10/08/2018     H/H stable  Cont to monitor.     10/8/18  Hgb is stable, no reported symptoms.  Continue to monitor and treat for symptomatic anemia or hgb < 7        Malnutrition    Dietary consult     10/8/18  Dietary consulted.  Patient does not like food being served.  She is lactose intolerant, spoke with dietary regarding obtaining supplement that is lactose free.         Major depressive disorder with single episode, in full remission    Chronic and stable  Cont Citalopram to treat    10/8/18  Chronic, continue Celexa and follow up with treating provider.        CKD (chronic kidney disease), stage IV    Lab Results   Component Value Date    CREATININE 3.0 (H) 10/08/2018     Sr Cr stable but BUN slightly trending upwards  Cont to monitor with biweekly labs   Avoid nephrotoxins.    Renally dose all medications   Monitor urine output   Cont sodium bicarb to treat  Patient will need follow up with Nephrology while at [facility]    10/8/18  BUN/Cr is improving, continue to monitor.  CO2 up to 30 and therefore will reduce sodium bicarb to bid dosing.  Bumex and Zaroxolyn currently on hold, weight is stable.  Follow up with Nephrology as stated above,  to assist with arranging appointment.  Future Appointments   Date Time Provider Department Center   10/30/2018  9:30 AM ANNUAL WELLNESS VISIT-NURSE PRACTITIONER, N[facility] 2 [facility] IM [facility]W   10/30/2018 11:00 AM Amarilys Johns MD [facility] MED CLN [facility]   11/6/2018  9:00 AM Yeimi Burdick MD [facility] HEARTTX [facility]             Dyslipidemia    Cont atorvastatin to treat    10/8/18  Chronic, continue Lipitor as ordered        GERD (gastroesophageal reflux disease)    Cont Protonix to treat    10/8/18  Chronic, continue Protonix as ordered.        Left ventricular diastolic dysfunction with preserved systolic function    Maintain euvolemia by monitoring I/O's and daily weights.  Fluid restriction (1.5 liters/24 hours)  Low Na diet  Monitor for signs of fluid overload: O2 sat<92%, weight gain of >3 lbs, or urinary output <160ml/8hr  Maintain oxygen sats >92% via NC if supplemental oxygen needed.   Current Bumex and Metolazone on hold due to hypotension. Cont to monitor and restart when appropriate. Patient is currently below her dry weight as listed below.   Cont coreg, Imdur to treat      Patient Vitals for the past 72 hrs (Last 3 readings):   Weight   10/08/18 0600 48.8 kg (107 lb 9.4 oz)   10/07/18 0600 47.4 kg (104 lb 8 oz)   10/06/18 0535 48.5 kg (106 lb 14.8 oz)     10/8/18  Weight is stable.  BP mildly elevated.  No edema noted.  Per MD note, Metolazone and Bumex on hold due to hypotension (may need to reconsider restarting in next 24 hours if BP up)  Continue fluid restriction and monitor.        Acquired hypothyroidism    Cont with  current dose of levothyroxine to treat   Lab Results   Component Value Date    TSH 0.954 09/05/2018    I3DHBAM 68 06/30/2017    G9IAFZS 4.4 (L) 06/30/2017    FREET4 0.96 09/05/2018     Current treatment plan effective  No change in therapy  Repeat TSH as outpatient with PCP    10/8/18  Continue Synthroid as ordered and follow up with PCP.        Edema    Low Na diet  NADER hose  Leg elevation   Lasix PRN for now     10/8/18  Mild edema to left lower leg.  Continue to encourage use of compression socks (currently wearing) and limb elevation.  Patient on fluid restriction.  Lasix PRN ordered.  BUN elevated but improving and Creatinine remains elevated but chronic.        Residual stage angle-closure glaucoma - Both Eyes    Cont home Latanoprost to treat.     10/8/18  No acute issues.  Continue Latanoprost and follow up with treating provider.        Resistant hypertension    BP Readings from Last 3 Encounters:   10/08/18 (!) 162/71   10/04/18 (!) 160/72   09/28/18 123/63     BP ok   Cont current Coreg, Nifedipine, doxazosin, imdur to treat  Cardiac diet    10/8/18  BP elevated this AM but patient very upset and requesting to speak with charge nurse.  Will continue Coreg, Nifedipine, Doxazosin and Imdur and monitor.  If BP continues to be high will adjust coreg dosing.        Coronary artery disease due to calcified coronary lesion    Cont current medical management with Coreg, ASA, atorvastatin, Imdur to treat  No current complaints of CP or PEDRO    10/8/18  Continue chronic medical management with b-blocker, lipitor, ASA, and Imdur.  No CP and reports gets PEDRO at times (states better now)        Other forms of systemic lupus erythematosus    Continue home hydroxychloroquine 200mg bid    10/8/18  Chronic, continue plaquenil as ordered and follow up with Rheumatology.              Mehul Blackwood NP  Department of Hospital Medicine  [facility]

## 2018-10-08 NOTE — PLAN OF CARE
Problem: Physical Therapy Goal  Goal: Physical Therapy Goal  Goals to be met by: 9 days     Patient will increase functional independence with mobility by performin. Supine to sit with Modified [place] = met 10/8/2018  2. Sit to supine with Modified [place] = met 10/8/2018  3. Sit to stand transfer with Supervision using the least restrictive device.  4. Bed to chair transfer with Supervision using the least restrictive device.  5. Gait  x 80 feet with Supervision using the least restrictive device.  6. Ascend/descend 8 steps with bilateral Handrails Stand-by Assistance.  7. Ascend/Descend 4 inch curb step with Supervision using the least restrictive device.  8. Stand for 3 minutes with Stand-by Assistance using the least restrictive device.  9. Lower extremity exercise program x20 reps per handout, with assistance as needed  NEW GOAL 10/8/2018 :  10. Gait x 200 feet w/ Supervision w/ rollator = not met     Goals remain appropriate. New gait goal added. Continue with Physical therapy Plan of Care. Bren Azevedo, PT 10/8/2018

## 2018-10-08 NOTE — ASSESSMENT & PLAN NOTE
BP Readings from Last 3 Encounters:   10/08/18 (!) 162/71   10/04/18 (!) 160/72   09/28/18 123/63     BP ok   Cont current Coreg, Nifedipine, doxazosin, imdur to treat  Cardiac diet    10/8/18  BP elevated this AM but patient very upset and requesting to speak with charge nurse.  Will continue Coreg, Nifedipine, Doxazosin and Imdur and monitor.  If BP continues to be high will adjust coreg dosing.

## 2018-10-08 NOTE — ASSESSMENT & PLAN NOTE
Lab Results   Component Value Date    HGB 8.7 (L) 10/08/2018     H/H stable  Cont to monitor.     10/8/18  Hgb is stable, no reported symptoms.  Continue to monitor and treat for symptomatic anemia or hgb < 7

## 2018-10-08 NOTE — ASSESSMENT & PLAN NOTE
Cont current medical management with Coreg, ASA, atorvastatin, Imdur to treat  No current complaints of CP or PEDRO    10/8/18  Continue chronic medical management with b-blocker, lipitor, ASA, and Imdur.  No CP and reports gets PEDRO at times (states better now)

## 2018-10-08 NOTE — ASSESSMENT & PLAN NOTE
Dietary consult     10/8/18  Dietary consulted.  Patient does not like food being served.  She is lactose intolerant, spoke with dietary regarding obtaining supplement that is lactose free.

## 2018-10-08 NOTE — ASSESSMENT & PLAN NOTE
Cont with current dose of levothyroxine to treat   Lab Results   Component Value Date    TSH 0.954 09/05/2018    [id number] 68 06/30/2017    L9MTPKT 4.4 (L) 06/30/2017    FREET4 0.96 09/05/2018     Current treatment plan effective  No change in therapy  Repeat TSH as outpatient with PCP    10/8/18  Continue Synthroid as ordered and follow up with PCP.

## 2018-10-08 NOTE — PT/OT/SLP PROGRESS
Physical Therapy  Treatment    Ewelina Arnold   MRN: [id number]   Admitting Diagnosis: Debility    PT Received On: 10/08/18          Billable Minutes:  Gait Training 23, Therapeutic Activity 15 and Therapeutic Exercise 15    Treatment Type: Treatment  PT/PTA: PT     PTA Visit Number: 0       General Precautions: Standard, fall  Orthopedic Precautions: N/A   Braces: N/A         Subjective:  Communicated with patient prior to session.  Patient agreeable to session    Pain/Comfort  Pain Rating 1: 0/10  Pain Rating Post-Intervention 1: 0/10    Objective:  Patient found in bathroom, walking from toilet w/o AD or assistance   with       AM-PAC 6 CLICK MOBILITY  Total Score:19    Bed Mobility:  Sit>Supine:on mat w/ mod(I)  Supine>Sit: on mat w/ mod(I)    Transfers:  Sit<>Stand: to/from w/c w/ RW and SBA; to/from w/c w/ rollator x2 trials and SBA; to/from mat w/ rollator and SBA.Cues for hand placement, technique all trials. Cues for rollator brakes managment  Stand Pivot Transfer: walking, to/from mat w/ rollator and SBA,cues  To/from nustep w/ rollator and SBA,cues    Gait:  Amb w/ RW and SBA/CGA90 feet. SpO2 99% and HR 66  After seated rest break, amb w/ Rollator and SBA/CGA 95 feet. SpO2 99% and HR 65.  amb w/ rollator and SBA 20 feet, 15 feet. SBA  In room, amb w/ RW and SBA ~20 feet to toilet.     Sits to toilet and manages clothes w/ distant supervision  Advanced Gait:  Stairs: np  Curb Step: np       Therex:  Quad sets,   Glute sets,   Ankle  Pumps,   hip abduction/adduction,  heelslides,   SAQ,   LAQ   Hip flexion  x20 reps w/ assist as needed   Patient performed 10 minutes on recumbent stepper with bilateral arms and legs on workload 2      Balance:  Patient stands w/ rollator w/ SBA  Stands w/o AD w/ SBA w/o LOB    Additional Treatment:  Patient observed walking from toilet into room w/o AD. Patient instructed in need to call for assitance and use RW.    Patient left sitting on toilet with call button in reach and  instructed to call for assistance and PCT notified and cking on her..    Assessment:  Ewelina Arnold is a 72 y.o. female with a medical diagnosis of Debility.  Patient is progressing well. Used Rollator today. Continues to fatigue qucily. Patient will benefit from continued physical therapy to address deficits and improve safety and functional mobility. Continue with physical therapy plan of care. .    Rehab identified problem list/impairments: weakness, impaired endurance, impaired functional mobilty, gait instability, impaired balance, decreased lower extremity function, decreased coordination, edema    Rehab potential is good.    Activity tolerance: Good    Discharge recommendations: home with home health     Barriers to discharge: Inaccessible home environment, Decreased caregiver support    Equipment recommendations: shower chair     GOALS:   Multidisciplinary Problems     Physical Therapy Goals        Problem: Physical Therapy Goal    Goal Priority Disciplines Outcome Goal Variances Interventions   Physical Therapy Goal     PT, PT/OT Ongoing (interventions implemented as appropriate)     Description:  Goals to be met by: 9 days     Patient will increase functional independence with mobility by performin. Supine to sit with Modified [place] = met 10/8/2018  2. Sit to supine with Modified [place] = met 10/8/2018  3. Sit to stand transfer with Supervision using the least restrictive device.  4. Bed to chair transfer with Supervision using the least restrictive device.  5. Gait  x 80 feet with Supervision using the least restrictive device.  6. Ascend/descend 8 steps with bilateral Handrails Stand-by Assistance.  7. Ascend/Descend 4 inch curb step with Supervision using the least restrictive device.  8. Stand for 3 minutes with Stand-by Assistance using the least restrictive device.  9. Lower extremity exercise program x20 reps per handout, with assistance as needed  NEW GOAL 10/8/2018 :  10. Gait  x 200 feet w/ Supervision w/ rollator = not met                       PLAN:    Patient to be seen 5 x/week  to address the above listed problems via gait training, therapeutic activities, therapeutic exercises  Plan of Care expires: 11/04/18  Plan of Care reviewed with: patient    Bren PRABHAKAR Jolly, PT  10/08/2018

## 2018-10-08 NOTE — ASSESSMENT & PLAN NOTE
Cont with PT/OT for gait training and strengthening and restoration of ADL's   Encourage mobility, OOB in chair, and early ambulation as appropriate  Fall precautions   Monitor for bowel and bladder dysfunction  Monitor for and prevent skin breakdown and pressure ulcers  Cont DVT prophylaxis with heparin   Anticoagulants   Medication Route Frequency    heparin (porcine) injection 5,000 Units Subcutaneous Q8H     10/8/18  Continue therapy to improve functional strength.  Heparin for dvt ppx as ordered.  Plans to discharge home with family help.

## 2018-10-08 NOTE — ASSESSMENT & PLAN NOTE
Low Na diet  NADER hose  Leg elevation   Lasix PRN for now     10/8/18  Mild edema to left lower leg.  Continue to encourage use of compression socks (currently wearing) and limb elevation.  Patient on fluid restriction.  Lasix PRN ordered.  BUN elevated but improving and Creatinine remains elevated but chronic.

## 2018-10-08 NOTE — TELEPHONE ENCOUNTER
----- Message from Alejandra Levine MA sent at 10/8/2018  4:07 PM CDT -----  Contact: skilled nursing       ----- Message -----  From: Anat Caruso  Sent: 10/8/2018   4:02 PM  To: [facility] Clinical Staff    Skilled nursing   Called     CKD 4  MARYAM   Being released  10/12       Please call Kiah  [phone]    With a work in appt     Thanks     Spoke to kiah and scheduled appt for pt

## 2018-10-08 NOTE — ASSESSMENT & PLAN NOTE
Lab Results   Component Value Date    CREATININE 3.0 (H) 10/08/2018     Sr Cr stable but BUN slightly trending upwards  Cont to monitor with biweekly labs   Avoid nephrotoxins.   Renally dose all medications   Monitor urine output   Cont sodium bicarb to treat  Patient will need follow up with Nephrology while at [facility]    10/8/18  BUN/Cr is improving, continue to monitor.  CO2 up to 30 and therefore will reduce sodium bicarb to bid dosing.  Bumex and Zaroxolyn currently on hold, weight is stable.  Follow up with Nephrology as stated above,  to assist with arranging appointment.  Future Appointments   Date Time Provider Department Center   10/30/2018  9:30 AM ANNUAL WELLNESS VISIT-NURSE PRACTITIONER, NOM 2 [facility] IM Markus Yung PCW   10/30/2018 11:00 AM Amarilys Johns MD [facility] MED CLN Markus Yung PCW   11/6/2018  9:00 AM Yeimi Burdick MD [facility] HEARTTX Markus Yung

## 2018-10-08 NOTE — PROGRESS NOTES
" [facility] - Skilled Nursing Care  Adult Nutrition  Progress Note    SUMMARY       Recommendations    Recommendation/Intervention: Continue renal diet; 2 gm Na; fluid restriction; lactaid milk protein smoothie BID and fruit with meals  Goals: PO to meet 85% of EEN in one week  Nutrition Goal Status: progressing towards goal  Communication of RD Recs: (S) other (comment)(POC)    Reason for Assessment    Reason for Assessment: RD follow-up  Diagnosis: (CHF)  Relevant Medical History: HTN, Lupus, HLD, CKD, anemia, PAD, CHF, malnutrition 2017,   Interdisciplinary Rounds: attended  General Information Comments: Pt with good appetite but poor intake 2/2 strict diet and limited food choices. Pt will receive protein smoothie BID using lactaid milk; berries and Beneprotein. Pt was requesting fresh fruit with meals, will provide an apple each meal. No N/V/C/D reported at this time, LBM 10/8/18. Will monitor glucose levels and weight.   Nutrition Discharge Planning: DC on low sodium diet, fluid restriction per MD    Nutrition Risk Screen    Nutrition Risk Screen: no indicators present    Nutrition/Diet History    Patient Reported Diet/Restrictions/Preferences: lactose restricted  Food Preferences: (lactose intolerance)  Do you have any cultural, spiritual, Anglican conflicts, given your current situation?: none  Supplemental Drinks or Food Habits: Ensure Plus  Food Allergies: (lactose)  Factors Affecting Nutritional Intake: diarrhea, altered gastrointestinal function    Anthropometrics    Temp: 98 °F (36.7 °C)  Height Method: Stated  Height: 5' 4" (162.6 cm)  Height (inches): 64 in  Weight Method: Standard Scale  Weight: 48.8 kg (107 lb 9.4 oz)  Weight (lb): 107.59 lb  Ideal Body Weight (IBW), Female: 120 lb  % Ideal Body Weight, Female (lb): 88.92 lb  BMI (Calculated): 18.4  BMI Grade: 17 - 18.4 protein-energy malnutrition grade I  Weight Loss: unintentional  Usual Body Weight (UBW), k.8 kg  % Usual Body Weight: 93.63  % " Weight Change From Usual Weight: -6.56 %   wt stable    Lab/Procedures/Meds    Pertinent Labs Reviewed: reviewed  Pertinent Labs Comments: ; Cr 4.4; Albumin 2.1  Pertinent Medications Reviewed: reviewed  Pertinent Medications Comments: ergocalciferol; sodium bicarbonate    Physical Findings/Assessment  NFPE completed 10/5/18  Overall Physical Appearance: loss of muscle mass, loss of subcutaneous fat, weak  Tubes: (-)  Oral/Mouth Cavity: WDL  Skin: intact    Estimated/Assessed Needs    Weight Used For Calorie Calculations: 48.4 kg (106 lb 11.2 oz)  Energy Calorie Requirements (kcal): 1300  Energy Need Method: Delta-St Jeor(x 1.3)  Protein Requirements: 48-40g(.8-1.0g/kg)  Weight Used For Protein Calculations: 48.4 kg (106 lb 11.2 oz)  Fluid Requirements (mL): 1200 ml per MD   RDA Method (mL): 1300  CHO Requirement: -      Nutrition Prescription Ordered    Current Diet Order: Renal; 1.2L fluid restriction; 2 gm Na; lactose intolerance  Nutrition Order Comments: Pt looking for foods she can eat will try protein smoothie and fruit at meals  Oral Nutrition Supplement: None    Evaluation of Received Nutrient/Fluid Intake    Energy Calories Required: not meeting needs  Protein Required: meeting needs  Fluid Required: meeting needs  Tolerance: tolerating  % Intake of Estimated Energy Needs: 50 - 75 %  % Meal Intake: 50 - 75 %    Nutrition Risk    Level of Risk/Frequency of Follow-up: low     Assessment and Plan  Malnutrition     Malnutrition in the context of Chronic Illness/Injury     Related to (etiology):  Poor appetite, limited food choices on special diet as perceived by patient     Signs and Symptoms (as evidenced by):  Energy Intake: <75% of estimated energy requirement for > one month   Body Fat Depletion: moderate depletion of orbitals, triceps and thoracic and lumbar region   Muscle Mass Depletion: moderate depletion of temples, clavicle region, scapular region, interosseous muscle and lower extremities    Weight Loss: 7% x 90 days ( weight 59.5 kg 3/31/17 now 48.4 kg)     Interventions/Recommendations (treatment strategy):  Nutrition Education  Renal diet, 2 gm Na, 1.2L fluid restriction, Lactaid milk 1/2c daily as requested and protein smoothie BID; fruit with meals  Consider liberalizing diet for dc     Nutrition Diagnosis Status:   Continues          Monitor and Evaluation    Food and Nutrient Intake: food and beverage intake  Food and Nutrient Adminstration: diet order  Knowledge/Beliefs/Attitudes: food and nutrition knowledge/skill  Anthropometric Measurements: weight change  Biochemical Data, Medical Tests and Procedures: electrolyte and renal panel, inflammatory profile, gastrointestinal profile  Nutrition-Focused Physical Findings: overall appearance     Nutrition Follow-Up    RD Follow-up?: Yes

## 2018-10-08 NOTE — ASSESSMENT & PLAN NOTE
Maintain euvolemia by monitoring I/O's and daily weights.  Fluid restriction (1.5 liters/24 hours)  Low Na diet  Monitor for signs of fluid overload: O2 sat<92%, weight gain of >3 lbs, or urinary output <160ml/8hr  Maintain oxygen sats >92% via NC if supplemental oxygen needed.   Current Bumex and Metolazone on hold due to hypotension. Cont to monitor and restart when appropriate. Patient is currently below her dry weight as listed below.   Cont coreg, Imdur to treat      Patient Vitals for the past 72 hrs (Last 3 readings):   Weight   10/08/18 0600 48.8 kg (107 lb 9.4 oz)   10/07/18 0600 47.4 kg (104 lb 8 oz)   10/06/18 0535 48.5 kg (106 lb 14.8 oz)     10/8/18  Weight is stable.  BP mildly elevated.  No edema noted.  Per MD note, Metolazone and Bumex on hold due to hypotension (may need to reconsider restarting in next 24 hours if BP up)  Continue fluid restriction and monitor.

## 2018-10-09 ENCOUNTER — OUTPATIENT CASE MANAGEMENT (OUTPATIENT)
Dept: ADMINISTRATIVE | Facility: OTHER | Age: 73
End: 2018-10-09

## 2018-10-09 PROCEDURE — 25000003 PHARM REV CODE 250: Performed by: NURSE PRACTITIONER

## 2018-10-09 PROCEDURE — 97116 GAIT TRAINING THERAPY: CPT

## 2018-10-09 PROCEDURE — 25000003 PHARM REV CODE 250: Performed by: HOSPITALIST

## 2018-10-09 PROCEDURE — 11000004 HC SNF PRIVATE

## 2018-10-09 PROCEDURE — 97110 THERAPEUTIC EXERCISES: CPT

## 2018-10-09 PROCEDURE — 97530 THERAPEUTIC ACTIVITIES: CPT

## 2018-10-09 RX ORDER — DOXAZOSIN 1 MG/1
2 TABLET ORAL NIGHTLY
Status: DISCONTINUED | OUTPATIENT
Start: 2018-10-09 | End: 2018-10-12 | Stop reason: HOSPADM

## 2018-10-09 RX ADMIN — LATANOPROST 1 DROP: 50 SOLUTION OPHTHALMIC at 09:10

## 2018-10-09 RX ADMIN — CARVEDILOL 6.25 MG: 6.25 TABLET, FILM COATED ORAL at 08:10

## 2018-10-09 RX ADMIN — CARVEDILOL 6.25 MG: 6.25 TABLET, FILM COATED ORAL at 10:10

## 2018-10-09 RX ADMIN — ATORVASTATIN CALCIUM 80 MG: 20 TABLET, FILM COATED ORAL at 10:10

## 2018-10-09 RX ADMIN — PANTOPRAZOLE SODIUM 40 MG: 40 TABLET, DELAYED RELEASE ORAL at 10:10

## 2018-10-09 RX ADMIN — DOXAZOSIN 2 MG: 1 TABLET ORAL at 08:10

## 2018-10-09 RX ADMIN — ASPIRIN 81 MG: 81 TABLET, COATED ORAL at 11:10

## 2018-10-09 RX ADMIN — HYDROXYCHLOROQUINE SULFATE 200 MG: 200 TABLET, FILM COATED ORAL at 08:10

## 2018-10-09 RX ADMIN — HYDROXYCHLOROQUINE SULFATE 200 MG: 200 TABLET, FILM COATED ORAL at 10:10

## 2018-10-09 RX ADMIN — CITALOPRAM HYDROBROMIDE 30 MG: 20 TABLET ORAL at 10:10

## 2018-10-09 RX ADMIN — NIFEDIPINE 60 MG: 30 TABLET, FILM COATED, EXTENDED RELEASE ORAL at 08:10

## 2018-10-09 RX ADMIN — CALCIUM CARBONATE (ANTACID) CHEW TAB 500 MG 500 MG: 500 CHEW TAB at 08:10

## 2018-10-09 RX ADMIN — ISOSORBIDE MONONITRATE 60 MG: 30 TABLET, EXTENDED RELEASE ORAL at 11:10

## 2018-10-09 RX ADMIN — SODIUM BICARBONATE 650 MG TABLET 650 MG: at 11:10

## 2018-10-09 RX ADMIN — NIFEDIPINE 60 MG: 30 TABLET, FILM COATED, EXTENDED RELEASE ORAL at 10:10

## 2018-10-09 RX ADMIN — LEVOTHYROXINE SODIUM 75 MCG: 75 TABLET ORAL at 06:10

## 2018-10-09 NOTE — PROGRESS NOTES
Summary:  Chart reviewed in epic.  Noted pt went to skilled nursing    interventions  Call to skilled nursing and spoke with naveed jesus np  About pt nutritional status.  He states that he already discussed with dietician and they are reviewing the different supplements that would be available due to pt lactose intolerance.  Otherwise pt is stable.    Plan:    Review nutrition and supplements to increase albumin nepro  Follow up on contact from palliative care team.  Follow up on receipt of education material  Resend if not received next encounter  Review low sodium diet  Follow up on receipt of [employer] OTC catalog      Todays OPCM Self-Management Care Plan was developed with the patients/caregivers input and was based on identified barriers from todays assessment.  Goals were written today with the patient/caregiver and the patient has agreed to work towards these goals to improve his/her overall well-being. Patient verbalized understanding of the care plan, goals, and all of today's instructions. Encouraged patient/caregiver to communicate with his/her physician and health care team about health conditions and the treatment plan.  Provided my contact information today and encouraged patient/caregiver to call me with any questions as needed.

## 2018-10-09 NOTE — PLAN OF CARE
Problem: Physical Therapy Goal  Goal: Physical Therapy Goal  Goals to be met by: 9 days     Patient will increase functional independence with mobility by performin. Supine to sit with Modified [place] = met 10/8/2018  2. Sit to supine with Modified [place] = met 10/8/2018  3. Sit to stand transfer with Supervision using the least restrictive device.  4. Bed to chair transfer with Supervision using the least restrictive device.  5. Gait  x 80 feet with Supervision using the least restrictive device.  6. Ascend/descend 8 steps with bilateral Handrails Stand-by Assistance. met  7. Ascend/Descend 4 inch curb step with Supervision using the least restrictive device.  8. Stand for 3 minutes with Stand-by Assistance using the least restrictive device.  9. Lower extremity exercise program x20 reps per handout, with assistance as needed  NEW GOAL 10/8/2018 :  10. Gait x 200 feet w/ Supervision w/ rollator = not met      Outcome: Ongoing (interventions implemented as appropriate)  Goals remain appropriate

## 2018-10-09 NOTE — PT/OT/SLP PROGRESS
"Physical Therapy  Treatment    Ewelina Arnold   MRN: [id number]   Admitting Diagnosis: Debility    PT Received On: 10/09/18          Billable Minutes:  Gait Training 13, Therapeutic Activity 17 and Therapeutic Exercise 23    Treatment Type: Treatment  PT/PTA: PTA     PTA Visit Number: 1       General Precautions: Standard, fall  Orthopedic Precautions: N/A   Braces: N/A         Subjective:  "just need a clean pants I wet these" agreeable to therapy      Pain/Comfort  Pain Rating 1: 0/10  Pain Rating Post-Intervention 1: 0/10    Objective:  Patient found with: (in bathroom washing up seated on BSCommode)     AM-PAC 6 CLICK MOBILITY  Total Score:19      Transfers:  Sit<>Stand: SBA with RW and rollator vcs for safety/tech/brakes , from BSC,WC and rollator  Stand Pivot Transfer: SBA with RW in room ~ 10 ft from bathroom to WC/BSC ~ 5 ft SBA no AD      Gait:  Amb with rollator SBA ~ 65 ft and 95 ft with seated rest break on rollator vcs for safety/tech    Advanced Gait:  Stairs: asc/dec 4 steps BHR x 2 trails with SBA  vcs for safety/tech,rest breaks as needed  Curb Step: asc/momo 4" curb with rollator SBA vcs for safety/tech    Therex:  2x10 reps AP,LAQ,hip flex,abd/add    Additional Treatment:  Recumbent cross  x 10 min L-2  A with donning pants and shoes pt put socks on    Patient left up in chair with call button in reach and belongins in reach.    Assessment:  Ewelina Arnold is a 72 y.o. female with a medical diagnosis of Debility.  Pt tolerated well, pt performed curb and steps today with SBA, pt would continue to benefit from skilled PT services to improve overall functional mobility, strength and endurance.  .    Rehab identified problem list/impairments: weakness, impaired endurance, impaired functional mobilty, gait instability, impaired balance, decreased lower extremity function, decreased coordination, edema    Rehab potential is good.    Activity tolerance: Fair    Discharge recommendations: home with home " health light A/S for safety    Barriers to discharge: Inaccessible home environment, Decreased caregiver support    Equipment recommendations: shower chair     GOALS:   Multidisciplinary Problems     Physical Therapy Goals        Problem: Physical Therapy Goal    Goal Priority Disciplines Outcome Goal Variances Interventions   Physical Therapy Goal     PT, PT/OT Ongoing (interventions implemented as appropriate)     Description:  Goals to be met by: 9 days     Patient will increase functional independence with mobility by performin. Supine to sit with Modified [place] = met 10/8/2018  2. Sit to supine with Modified [place] = met 10/8/2018  3. Sit to stand transfer with Supervision using the least restrictive device.  4. Bed to chair transfer with Supervision using the least restrictive device.  5. Gait  x 80 feet with Supervision using the least restrictive device.  6. Ascend/descend 8 steps with bilateral Handrails Stand-by Assistance. met  7. Ascend/Descend 4 inch curb step with Supervision using the least restrictive device.  8. Stand for 3 minutes with Stand-by Assistance using the least restrictive device.  9. Lower extremity exercise program x20 reps per handout, with assistance as needed  NEW GOAL 10/8/2018 :  10. Gait x 200 feet w/ Supervision w/ rollator = not met                        PLAN:    Patient to be seen 5 x/week  to address the above listed problems via gait training, therapeutic activities, therapeutic exercises  Plan of Care expires: 18  Plan of Care reviewed with: patient    Gladis Stephensonmiguel, PTA  10/09/2018

## 2018-10-09 NOTE — PLAN OF CARE
Problem: Occupational Therapy Goal  Goal: Occupational Therapy Goal  Goals to be met by: 11 days     Patient will increase functional independence with ADLs by performing:    UE Dressing with Modified [place].  LE Dressing with Supervision.  Grooming while standing with A/D and Supervision.--met  Toileting from bedside commode with Modified [place] for hygiene and clothing management. --met on toilet with RW and grab bar  Bathing from  shower chair/bench with Supervision.  Supine to sit with Modified [place].  Stand pivot transfers with Modified [place] with RW.  Pt will tolerate up to 10 minutes of functional standing activity with A/D to steady and (S).  Pt's caregiver will be competent with assisting with self care tasks and functional mobility.     Outcome: Ongoing (interventions implemented as appropriate)  Progressing. BRANDEE Berg  10/9/2018

## 2018-10-09 NOTE — CLINICAL REVIEW
Clinical Pharmacy Chart Review Note      Admit Date: 10/4/2018   LOS: 5 days       Ewelina Arnold is a 72 y.o. female admitted to [facility] for PT/OT after hospitalization for debility.    Active Hospital Problems    Diagnosis  POA    *Debility [R53.81]  Yes    Vitamin D deficiency [E55.9]  Yes    Metabolic acidosis, normal anion gap (NAG) [E87.2]  Yes    Anemia in stage 4 chronic kidney disease [N18.4, D63.1]  Yes    Major depressive disorder with single episode, in full remission [F32.5]  Yes    Malnutrition [E46]  Yes    CKD (chronic kidney disease), stage IV [N18.4]  Yes     Donated kidney to her brother  1/23/2017 patient continues to follow-up with nephrology awaiting renal panel will contact patient results available.  We'll continue to monitor      Left ventricular diastolic dysfunction with preserved systolic function [I51.9]  Yes     Low EF, aortic stenosis on recent echo      GERD (gastroesophageal reflux disease) [K21.9]  Yes    Dyslipidemia [E78.5]  Yes    Acquired hypothyroidism [E03.9]  Yes     TSH controlled on supplement      Edema [R60.9]  Yes    Residual stage angle-closure glaucoma - Both Eyes [H40.249]  Yes    Coronary artery disease due to calcified coronary lesion [I25.10, I25.84]  Yes    Resistant hypertension [I10]  Yes     Newly compliant with BP and CHF meds      Other forms of systemic lupus erythematosus [M32.8]  Yes     Chronic     Early 1990's developed acute respiratory failure and spent weeks in ICU [facility]   Positive CARYN, SSB  Hx Rash, leukopenia,         Resolved Hospital Problems   No resolved problems to display.     Review of patient's allergies indicates:   Allergen Reactions    Ace inhibitors Swelling     Lips Swelling    Hydralazine analogues Other (See Comments)     Lupus exacerbation    Imodium [loperamide] Nausea And Vomiting    Vioxx [rofecoxib] Swelling      lips swelling    Bactrim [sulfamethoxazole-trimethoprim]      Pt would like this medication to be  added due to elevation of creatinine with single kidney.    Clonidine Hallucinations     Hallucinations    Lactose     Arthrotec 50 [diclofenac-misoprostol]      Unknown    Bentyl [dicyclomine] Other (See Comments)     Not effective    Doxycycline Nausea Only    Lomotil [diphenoxylate-atropine] Nausea And Vomiting     Stomach cramps with vomitting    Lozol [indapamide] Rash    Norvasc [amlodipine]      Not tolerated    Tramadol Nausea Only     Patient Active Problem List    Diagnosis Date Noted    Debility 10/05/2018    CHF (congestive heart failure) 10/04/2018    Acute heart failure with normal ejection fraction     Vitamin D deficiency 09/29/2018    Hyponatremia 09/28/2018    Gastroenteritis, acute 09/07/2018    Fatigue 09/05/2018    Hypothermia 09/05/2018    Vision changes 08/28/2018    Hypovitaminosis D 06/28/2018    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) 06/28/2018    Synovial cyst of Right shoudler 06/28/2018    Anemia of chronic renal failure, stage 4 (severe) 06/26/2018    Breast cancer screening 06/12/2018    Senile purpura 05/18/2018    Polypharmacy 05/18/2018    Acute on chronic diastolic congestive heart failure 04/28/2018    Pulmonary edema cardiac cause 04/12/2018    Renovascular hypertension 04/04/2018    MARYAM (acute kidney injury) 04/03/2018    History of unilateral nephrectomy LEFT 04/03/2018    Metabolic acidosis, normal anion gap (NAG) 04/03/2018    Chronic diastolic congestive heart failure 03/21/2018    Hyperaldosteronism 03/21/2018    Impaired glucose tolerance 01/25/2018    Cor pulmonale, chronic 01/16/2018    Headache 01/15/2018    Arcuate scotoma of both eyes 12/13/2017    Solitary kidney, acquired 11/30/2017    Systolic congestive heart failure 11/27/2017    Nonrheumatic aortic valve stenosis 11/18/2017    Microcytic anemia 10/16/2017    Head trauma 09/19/2017    Anemia in stage 4 chronic kidney disease 07/25/2017    Pulmonary  hypertension 05/16/2017    Secondary hyperparathyroidism of renal origin 05/16/2017    Peripheral arterial disease 05/16/2017    Major depressive disorder with single episode, in full remission 05/16/2017    Cervical spine arthritis 05/16/2017    Malnutrition 05/16/2017    Bilateral carotid artery disease 05/16/2017    Weight loss, abnormal 03/16/2017    Positive dysphotopsia 02/13/2017    CKD (chronic kidney disease), stage IV 01/23/2017    Tortuous aorta 07/21/2016    Degeneration of lumbar or lumbosacral intervertebral disc 01/07/2016    Aortic atherosclerosis     Left ventricular diastolic dysfunction with preserved systolic function     GERD (gastroesophageal reflux disease)     History of colon cancer     Dyslipidemia     Insufficiency of tear film of both eyes 09/30/2015    Thyroid nodule 05/13/2015    Acquired hypothyroidism 05/13/2015    Osteopenia 05/13/2015    Nuclear sclerosis - Both Eyes 05/29/2013    Edema 12/12/2012    High risk medication use - Both Eyes 11/27/2012    Residual stage angle-closure glaucoma - Both Eyes 11/27/2012    Coronary artery disease due to calcified coronary lesion 10/05/2012    Post PTCA 10/05/2012    Resistant hypertension 10/05/2012    Other forms of systemic lupus erythematosus 08/17/2012       Scheduled Meds:    aspirin  81 mg Oral Daily    atorvastatin  80 mg Oral Daily    carvedilol  6.25 mg Oral BID    citalopram  30 mg Oral Daily    doxazosin  1 mg Oral QHS    ergocalciferol  50,000 Units Oral Q7 Days    heparin (porcine)  5,000 Units Subcutaneous Q8H    hydroxychloroquine  200 mg Oral BID    isosorbide mononitrate  60 mg Oral Daily    latanoprost  1 drop Both Eyes Daily    levothyroxine  75 mcg Oral Before breakfast    NIFEdipine  60 mg Oral BID    pantoprazole  40 mg Oral Daily    senna-docusate 8.6-50 mg  1 tablet Oral BID    sodium bicarbonate  650 mg Oral BID     Continuous Infusions:   PRN Meds: acetaminophen, bacitracin,  calcium carbonate, furosemide, ondansetron, ramelteon    OBJECTIVE:     Vital Signs (Last 24H)  Temp:  [98.4 °F (36.9 °C)]   Pulse:  [63]   Resp:  [18]   BP: (150)/(67)   SpO2:  [98 %]     Laboratory:  CBC:   Recent Labs   Lab  10/04/18   0349  10/08/18   0600   WBC  5.91  5.86   RBC  3.33*  3.23*   HGB  8.8*  8.7*   HCT  28.1*  27.8*   PLT  446*  366*   MCV  84  86   MCH  26.4*  26.9*   MCHC  31.3*  31.3*     BMP:   Recent Labs   Lab  10/03/18   0410  10/04/18   0349  10/08/18   0600   GLU  55*  79  58*   NA  140  138  141   K  3.5  3.5  3.8   CL  100  100  103   CO2  27  25  30*   BUN  119*  120*  82*   CREATININE  4.5*  4.4*  3.0*   CALCIUM  8.6*  8.5*  8.5*   MG  2.0  1.8  1.6     CMP:   Recent Labs   Lab  10/03/18   0410  10/04/18   0349  10/08/18   0600   GLU  55*  79  58*   CALCIUM  8.6*  8.5*  8.5*   ALBUMIN  2.1*  2.1*   --    NA  140  138  141   K  3.5  3.5  3.8   CO2  27  25  30*   CL  100  100  103   BUN  119*  120*  82*   CREATININE  4.5*  4.4*  3.0*     LFTs:   Recent Labs   Lab  10/03/18   0410  10/04/18   0349   ALBUMIN  2.1*  2.1*         ASSESSMENT/PLAN:     Active Hospital Problems    Diagnosis    *Debility   --PT/OT: APAP 650 mg q4hrs prn mild pain  --bowel regimen for constipation; hold for loose or frequent stools: senna-docusate twice daily  --DVT prophylaxis: Heparin 5000u q8hrs      Vitamin D deficiency   --Ergocalciferol 50,000u q7 days      Metabolic acidosis, normal anion gap (NAG)   **Monitor BMP  --Sodium bicarb 650 mg twice daily    BMP  Lab Results   Component Value Date     10/08/2018    K 3.8 10/08/2018     10/08/2018    CO2 30 (H) 10/08/2018    BUN 82 (H) 10/08/2018    CREATININE 3.0 (H) 10/08/2018    CALCIUM 8.5 (L) 10/08/2018    ANIONGAP 8 10/08/2018    ESTGFRAFRICA 17.2 (A) 10/08/2018    EGFRNONAA 14.9 (A) 10/08/2018         Anemia in stage 4 chronic kidney disease   **Monitor CBC  --H/H stable    Lab Results   Component Value Date    WBC 5.86 10/08/2018    HGB  8.7 (L) 10/08/2018    HCT 27.8 (L) 10/08/2018    MCV 86 10/08/2018     (H) 10/08/2018         Major depressive disorder with single episode, in full remission   --Citalopram 30 mg daily  Monitor: mental status for depression, suicide ideation, anxiety, serotonin syndrome, hyponatremia      CKD (chronic kidney disease), stage IV   **Monitor renal function and adjust medications accordingly  --Continue Na bicarb  10/9/2018 Estimated Creatinine Clearance: 13.1 mL/min (A) (based on SCr of 3 mg/dL (H)).       Left ventricular diastolic dysfunction with preserved systolic function   **Monitor weight  --Fluid restriction  --Low Na diet  --Continue isosorbide, carvedilol   --Bumex and metolazone on hole due to hypotension, restart when appropriate  --Furosemide 40 mg daily prn weight gain 2-3 lbs in 24 hrs or 4-6 lbs in a week    Wt Readings from Last 1 Encounters:   10/09/18 0600 48.9 kg (107 lb 12.9 oz)   10/08/18 0600 48.8 kg (107 lb 9.4 oz)   10/07/18 0600 47.4 kg (104 lb 8 oz)   10/06/18 0535 48.5 kg (106 lb 14.8 oz)   10/05/18 1642 48.4 kg (106 lb 11.2 oz)   10/05/18 0615 48.4 kg (106 lb 11.2 oz)         GERD (gastroesophageal reflux disease)   --Pantoprazole 40 mg daily      Dyslipidemia   --Atorvastatin 80 mg daily    Lab Results   Component Value Date    CHOL 181 08/28/2018    CHOL 128 05/15/2018    CHOL 93 (L) 03/20/2018     Lab Results   Component Value Date    HDL 66 08/28/2018    HDL 48 05/15/2018    HDL 33 (L) 03/20/2018     Lab Results   Component Value Date    LDLCALC 104.2 08/28/2018    LDLCALC 64.2 05/15/2018    LDLCALC 48.2 (L) 03/20/2018     Lab Results   Component Value Date    TRIG 54 08/28/2018    TRIG 79 05/15/2018    TRIG 59 03/20/2018     Lab Results   Component Value Date    CHOLHDL 36.5 08/28/2018    CHOLHDL 37.5 05/15/2018    CHOLHDL 35.5 03/20/2018     Lab Results   Component Value Date    ALT 11 09/25/2018    AST 26 09/25/2018    ALKPHOS 79 09/25/2018    BILITOT 0.2 09/25/2018          Acquired hypothyroidism   --Levothyroxine 75 mcg daily    Lab Results   Component Value Date    TSH 0.954 09/05/2018         Edema   --Low Na diet  --NADER hose  --Leg elevation  --Furosemide prn for now      Residual stage angle-closure glaucoma - Both Eyes   --Latanoprost 0.005% 1 gtt both eyes daily      Coronary artery disease due to calcified coronary lesion   --ASA 81 mg daily  --Atorvastatin 80 mg daily      Resistant hypertension   **Monitor BP and pulse  --Carvedilol 6.25 mg twice daily  --Doxazosin 1 mg nightly  --Isosorbide (24hr) 60 mg daily  --Nifedipine (24hr) 60 mg twice daily    BP Readings from Last 3 Encounters:   10/08/18 (!) 150/67   10/04/18 (!) 160/72   09/28/18 123/63     Pulse Readings from Last 3 Encounters:   10/08/18 63   10/04/18 65   09/28/18 61         Other forms of systemic lupus erythematosus   --Hydroxychloroquinine 200 mg twice daily          I have reviewed the medications in compliance with CMS Regulation F329 of the [place] Appendix PP.      Abbie Knutson, Pharm. D.  Clinical Pharmacist  [facility]

## 2018-10-09 NOTE — PLAN OF CARE
10/09/18 1031   Discharge Assessment   Assessment Type Discharge Planning Assessment   Confirmed/corrected address and phone number on facesheet? Yes   Assessment information obtained from? Patient   Expected Length of Stay (days) 12   Prior to hospitilization cognitive status: Alert/Oriented   Prior to hospitalization functional status: Independent   Current cognitive status: Alert/Oriented   Current Functional Status: Needs Assistance   Lives With alone   Able to Return to Prior Arrangements unable to determine at this time (comments)   Is patient able to care for self after discharge? Unable to determine at this time (comments)   Who are your caregiver(s) and their phone number(s)? Maria Esther Arnold [phone]   Patient's perception of discharge disposition home health   Readmission Within The Last 30 Days no previous admission in last 30 days   Patient currently being followed by outpatient case management? No   Patient currently receives any other outside agency services? No   Equipment Currently Used at Home walker, rolling;cane, quad;cane, straight   Do you have any problems affording any of your prescribed medications? No   Is the patient taking medications as prescribed? yes   Does the patient have transportation home? Yes   Transportation Available family or friend will provide   Discharge Plan A Home with family;Home Health   Discharge Plan B Home;Home Health   Patient/Family In Agreement With Plan yes

## 2018-10-09 NOTE — PT/OT/SLP PROGRESS
"Occupational Therapy  Treatment    Ewelina Arnold   MRN: [id number]   Admitting Diagnosis: Debility    OT Date of Treatment: 10/09/18  Total Time (min): 40 min    Billable Minutes:  Therapeutic Activity 25 and Therapeutic Exercise 15    General Precautions: Standard, fall  Orthopedic Precautions: N/A  Braces: N/A    Do you have any cultural, spiritual, Mosque conflicts, given your current situation?: None stated    Subjective:  Communicated with pt prior to session.  "I want to make the lunch group today"    Pain/Comfort  Pain Rating 1: 0/10  Pain Rating Post-Intervention 1: 0/10    Objective:  Patient found with: (seated in bedside chair)    Occupational Performance:    Bed Mobility:    · In bedside chair    Functional Mobility/Transfers:  · Patient completed Sit <> Stand Transfer with modified independence  with  rolling walker   · Patient completed Bed <> Chair Transfer using Stand Pivot technique with supervision with rolling walker  · Patient completed Toilet Transfer Stand Pivot technique with modified independence with  rolling walker  · Functional Mobility: pt ambulated from bedside chair across room to bathroom and back to ambulate to gym pushing w/c with sup approx  150ft. Pt ambulated from gym to activity room for lunch following session with sup approx 240ft    Activities of Daily Living:  · Feeding:  independence in w/c at table  · Grooming: modified independence standing at sink with RW  · Toileting: modified independence on toilet with RW and grab bar    AMPA 6 Click:  AMPA Total Score: 21    OT Exercises: UE Ergometer 15 min to increase functional endurance and UE strenght for ADLS    Additional Treatment:  Pt stood at counter no AD x 7 min to move objects along counter and use of counter as needed for balance to increase standing tolerance, balance and endurance for household tasks  Pt educated re: energy conservation and increasing activity as tolerated    Patient left up in chair in activity lunch " room    ASSESSMENT:  Ewelina Arnold is a 72 y.o. female with a medical diagnosis of Debility . Pt tolerated tx and cont to progress toward goals. Pt cont to be limited by decreased standing endurance;however, increasing indep with standing balance and self care tasks. Pt is motivated and cont to benefit from OT to increase functional indep and safety to return to PLOF with assist as needed.    Rehab identified problem list/impairments: weakness, impaired endurance, impaired self care skills, impaired functional mobilty, impaired balance, decreased lower extremity function, decreased safety awareness, impaired cardiopulmonary response to activity    Rehab potential is good    Activity tolerance: Good    Discharge recommendations: home with home health     Barriers to discharge: Inaccessible home environment, Decreased caregiver support     Equipment recommendations: (TBD)     GOALS:   Multidisciplinary Problems     Occupational Therapy Goals        Problem: Occupational Therapy Goal    Goal Priority Disciplines Outcome Interventions   Occupational Therapy Goal     OT, PT/OT Ongoing (interventions implemented as appropriate)    Description:  Goals to be met by: 11 days     Patient will increase functional independence with ADLs by performing:    UE Dressing with Modified [place].  LE Dressing with Supervision.  Grooming while standing with A/D and Supervision.--met  Toileting from bedside commode with Modified [place] for hygiene and clothing management. --met on toilet with RW and grab bar  Bathing from  shower chair/bench with Supervision.  Supine to sit with Modified [place].  Stand pivot transfers with Modified [place] with RW.  Pt will tolerate up to 10 minutes of functional standing activity with A/D to steady and (S).  Pt's caregiver will be competent with assisting with self care tasks and functional mobility.                       Plan:  Patient to be seen 6 x/week, 5 x/week to address the  above listed problems via self-care/home management, therapeutic activities, therapeutic exercises  Plan of Care expires: 11/05/18  Plan of Care reviewed with: patient    BRANDEE Berg  10/09/2018

## 2018-10-09 NOTE — PLAN OF CARE
WILFRED met with the Pt in her room and explained the NOMNC which the Pt signed.  A copy was given to the Pt [facility]

## 2018-10-10 PROBLEM — E44.0 MALNUTRITION OF MODERATE DEGREE: Status: ACTIVE | Noted: 2017-05-16

## 2018-10-10 PROCEDURE — 11000004 HC SNF PRIVATE

## 2018-10-10 PROCEDURE — 97530 THERAPEUTIC ACTIVITIES: CPT

## 2018-10-10 PROCEDURE — 97803 MED NUTRITION INDIV SUBSEQ: CPT

## 2018-10-10 PROCEDURE — 25000003 PHARM REV CODE 250: Performed by: NURSE PRACTITIONER

## 2018-10-10 PROCEDURE — 97110 THERAPEUTIC EXERCISES: CPT

## 2018-10-10 PROCEDURE — 25000003 PHARM REV CODE 250: Performed by: HOSPITALIST

## 2018-10-10 PROCEDURE — 63600175 PHARM REV CODE 636 W HCPCS: Performed by: HOSPITALIST

## 2018-10-10 PROCEDURE — 97116 GAIT TRAINING THERAPY: CPT

## 2018-10-10 RX ADMIN — LATANOPROST 1 DROP: 50 SOLUTION OPHTHALMIC at 08:10

## 2018-10-10 RX ADMIN — HYDROXYCHLOROQUINE SULFATE 200 MG: 200 TABLET, FILM COATED ORAL at 08:10

## 2018-10-10 RX ADMIN — LEVOTHYROXINE SODIUM 75 MCG: 75 TABLET ORAL at 05:10

## 2018-10-10 RX ADMIN — NIFEDIPINE 60 MG: 30 TABLET, FILM COATED, EXTENDED RELEASE ORAL at 09:10

## 2018-10-10 RX ADMIN — HYDROXYCHLOROQUINE SULFATE 200 MG: 200 TABLET, FILM COATED ORAL at 09:10

## 2018-10-10 RX ADMIN — NIFEDIPINE 60 MG: 30 TABLET, FILM COATED, EXTENDED RELEASE ORAL at 08:10

## 2018-10-10 RX ADMIN — CARVEDILOL 6.25 MG: 6.25 TABLET, FILM COATED ORAL at 08:10

## 2018-10-10 RX ADMIN — ATORVASTATIN CALCIUM 80 MG: 20 TABLET, FILM COATED ORAL at 08:10

## 2018-10-10 RX ADMIN — ISOSORBIDE MONONITRATE 60 MG: 30 TABLET, EXTENDED RELEASE ORAL at 08:10

## 2018-10-10 RX ADMIN — PANTOPRAZOLE SODIUM 40 MG: 40 TABLET, DELAYED RELEASE ORAL at 08:10

## 2018-10-10 RX ADMIN — CITALOPRAM HYDROBROMIDE 30 MG: 20 TABLET ORAL at 08:10

## 2018-10-10 RX ADMIN — SODIUM BICARBONATE 650 MG TABLET 650 MG: at 09:10

## 2018-10-10 RX ADMIN — HEPARIN SODIUM 5000 UNITS: 5000 INJECTION, SOLUTION INTRAVENOUS; SUBCUTANEOUS at 02:10

## 2018-10-10 RX ADMIN — CALCIUM CARBONATE (ANTACID) CHEW TAB 500 MG 500 MG: 500 CHEW TAB at 07:10

## 2018-10-10 RX ADMIN — SODIUM BICARBONATE 650 MG TABLET 650 MG: at 08:10

## 2018-10-10 RX ADMIN — ASPIRIN 81 MG: 81 TABLET, COATED ORAL at 08:10

## 2018-10-10 RX ADMIN — HEPARIN SODIUM 5000 UNITS: 5000 INJECTION, SOLUTION INTRAVENOUS; SUBCUTANEOUS at 09:10

## 2018-10-10 RX ADMIN — DOXAZOSIN 2 MG: 1 TABLET ORAL at 09:10

## 2018-10-10 RX ADMIN — CARVEDILOL 6.25 MG: 6.25 TABLET, FILM COATED ORAL at 09:10

## 2018-10-10 NOTE — PLAN OF CARE
Problem: Fall Risk (Adult)  Goal: Identify Related Risk Factors and Signs and Symptoms  Related risk factors and signs and symptoms are identified upon initiation of Human Response Clinical Practice Guideline (CPG)  Outcome: Ongoing (interventions implemented as appropriate)   10/10/18 2045   Fall Risk   Related Risk Factors (Fall Risk) fatigue/slow reaction;gait/mobility problems

## 2018-10-10 NOTE — PT/OT/SLP PROGRESS
"Occupational Therapy  Treatment    Ewelina Arnold   MRN: [id number]   Admitting Diagnosis: Debility    OT Date of Treatment: 10/10/18  Total Time (min): 39 min    Billable Minutes:  Therapeutic Activity 24 and Therapeutic Exercise 15    General Precautions: Standard, fall  Orthopedic Precautions: N/A  Braces: N/A    Do you have any cultural, spiritual, Rastafarian conflicts, given your current situation?: None stated    Subjective:  Communicated with pt prior to session.  " I am always so cold"    Pain/Comfort  Pain Rating 1: 0/10  Pain Rating Post-Intervention 1: 0/10    Objective:  Patient found with: (seated in bedside chair)    Occupational Performance:    Bed Mobility:    · Up in chair    Functional Mobility/Transfers:  · Patient completed Sit <> Stand Transfer with modified independence  with  no assistive device   · Functional Mobility: Pt ambulated from room to gym pushing w/c approx 120ft; pt sat into w/c; then ambulated to activity room pushing chair with sup approx 220ft    Activities of Daily Living:  · Feeding:  independence .    AMPA 6 Click:  AMPA Total Score: 21    OT Exercises: UE Ergometer 15 min to increase functional endurance for ADLs    Additional Treatment:  Pt stood from w/c no AD and performed red theraball throw and catch task x 20 reps with CGA to increase dynamic standing balance for self care and household tasks  Pt took seated break as pt cont with decreased endurance  Pt then stood with mod I and required CGA to min A for balance while kicking ball alternating right and left leg x 20 reps. Pt with decreased balance with increased fatigue    Patient left up in chair with lunch group    ASSESSMENT:  Ewelina Arnold is a 72 y.o. female with a medical diagnosis of Debility . Pt toelrated tx and demontrated increased dynamic standing balance;however, cont with SOB following activites and required recovery time. Pt aware of energy conservation and cont to progress toward goals. Pt cont to benefit " from OT to increase functional indep, endurance and safety to return to PLOF.    Rehab identified problem list/impairments: weakness, impaired endurance, impaired self care skills, impaired functional mobilty, impaired balance, decreased lower extremity function, decreased safety awareness, impaired cardiopulmonary response to activity    Rehab potential is good    Activity tolerance: Good    Discharge recommendations: home with home health     Barriers to discharge: Inaccessible home environment, Decreased caregiver support     Equipment recommendations: (TBD)     GOALS:   Multidisciplinary Problems     Occupational Therapy Goals        Problem: Occupational Therapy Goal    Goal Priority Disciplines Outcome Interventions   Occupational Therapy Goal     OT, PT/OT Ongoing (interventions implemented as appropriate)    Description:  Goals to be met by: 11 days     Patient will increase functional independence with ADLs by performing:    UE Dressing with Modified [place].  LE Dressing with Supervision.  Grooming while standing with A/D and Supervision.--met  Toileting from bedside commode with Modified [place] for hygiene and clothing management. --met on toilet with RW and grab bar  Bathing from  shower chair/bench with Supervision.  Supine to sit with Modified [place].  Stand pivot transfers with Modified [place] with RW.  Pt will tolerate up to 10 minutes of functional standing activity with A/D to steady and (S).  Pt's caregiver will be competent with assisting with self care tasks and functional mobility.                       Plan:  Patient to be seen 6 x/week, 5 x/week to address the above listed problems via self-care/home management, therapeutic activities, therapeutic exercises  Plan of Care expires: 11/05/18  Plan of Care reviewed with: patient    BRANDEE Berg  10/10/2018

## 2018-10-10 NOTE — PROGRESS NOTES
" [facility] - Skilled Nursing Care  Adult Nutrition  Progress Note    SUMMARY   Recommendations    Recommendation/Intervention: Continue renal diet, 1.2L fluid restriction  Goals: PO to meet 85% of EEN in one week  Nutrition Goal Status: progressing towards goal    Reason for Assessment    Reason for Assessment: RD follow-up  Diagnosis: (CHF)  Relevant Medical History: HTN, Lupus, HLD, CKD, anemia, PAD, CHF, malnutrition 2017,   Interdisciplinary Rounds: attended  General Information Comments: Pt with good appetite but poor intake 2/2 strict diet and limited food choices. Pt will receive protein smoothie BID using lactaid milk; berries and Beneprotein. Pt was requesting fresh fruit with meals, will provide an apple each meal. No N/V/C/D reported at this time, LBM 10/8/18. Will monitor glucose levels and weight.   Nutrition Discharge Planning: DC on low sodium diet, fluid restriction per MD    Nutrition/Diet History    Patient Reported Diet/Restrictions/Preferences: lactose restricted  Food Preferences: (lactose intolerance)  Do you have any cultural, spiritual, Denominational conflicts, given your current situation?: none  Supplemental Drinks or Food Habits: Ensure Plus  Food Allergies: (lactose)  Factors Affecting Nutritional Intake: diarrhea, altered gastrointestinal function    Anthropometrics    Temp: 98.1 °F (36.7 °C)  Height Method: Stated  Height: 5' 4" (162.6 cm)  Height (inches): 64 in  Weight Method: Standard Scale  Weight: 49.4 kg (108 lb 14.5 oz)  Weight (lb): 108.91 lb  Ideal Body Weight (IBW), Female: 120 lb  % Ideal Body Weight, Female (lb): 88.92 lb  BMI (Calculated): 18.4  BMI Grade: 17 - 18.4 protein-energy malnutrition grade I  Weight Loss: unintentional  Usual Body Weight (UBW), k.8 kg  % Usual Body Weight: 93.63  % Weight Change From Usual Weight: -6.56 %       Lab/Procedures/Meds    Pertinent Labs Reviewed: reviewed  Pertinent Labs Comments: PO 3.3, BUN 82, Cr 3, Hg 8.7, Hct 27.8  Pertinent " Medications Reviewed: reviewed  Physical Findings/Assessment    Overall Physical Appearance: loss of muscle mass, loss of subcutaneous fat, weak  Tubes: (-)  Oral/Mouth Cavity: WDL  Skin: intact    Estimated/Assessed Needs    Weight Used For Calorie Calculations: 48.4 kg (106 lb 11.2 oz)  Energy Calorie Requirements (kcal): 1300  Energy Need Method: [place]-St Jeor(x 1.3)  Protein Requirements: 48-40g(.8-1.0g/kg)  Weight Used For Protein Calculations: 48.4 kg (106 lb 11.2 oz)  Fluid Requirements (mL): 1200 ml per MD     RDA Method (mL): 1300  CHO Requirement: -      Nutrition Prescription Ordered    Current Diet Order: Renal 2gm Na, 1200 ml fluid restriciton  Nutrition Order Comments: PO 75%  Oral Nutrition Supplement: beneprotien fortified lactaid milk smoothis bid    Evaluation of Received Nutrient/Fluid Intake    Energy Calories Required: meeting needs  Protein Required: meeting needs  Fluid Required: meeting needs  Tolerance: tolerating  % Intake of Estimated Energy Needs: 75 - 100 %  % Meal Intake: 50 - 75 %    Nutrition Risk    Level of Risk/Frequency of Follow-up: high     Assessment and Plan       Malnutrition in the context of Chronic Illness/Injury     Related to (etiology):  Poor appetite, limited food choices on special diet as perceived by patient     Signs and Symptoms (as evidenced by):  Energy Intake: <75% of estimated energy requirement for > one month   Body Fat Depletion: moderate depletion of orbitals, triceps and thoracic and lumbar region   Muscle Mass Depletion: moderate depletion of temples, clavicle region, scapular region, interosseous muscle and lower extremities   Weight Loss: 7% x 90 days ( weight 59.5 kg 3/31/17 now 48.4 kg)        Protein smoothie with lactaid milk BID,   Nutrition Education  Renal diet, fluid restriction 1.2L  Encourage po intake  Monitor and Evaluation    Food and Nutrient Intake: food and beverage intake  Food and Nutrient Adminstration: diet  order  Knowledge/Beliefs/Attitudes: food and nutrition knowledge/skill  Anthropometric Measurements: weight change  Biochemical Data, Medical Tests and Procedures: electrolyte and renal panel, inflammatory profile, gastrointestinal profile  Nutrition-Focused Physical Findings: overall appearance     Nutrition Follow-Up    RD Follow-up?: Yes

## 2018-10-10 NOTE — PLAN OF CARE
Problem: Occupational Therapy Goal  Goal: Occupational Therapy Goal  Goals to be met by: 11 days     Patient will increase functional independence with ADLs by performing:    UE Dressing with Modified [place].  LE Dressing with Supervision.  Grooming while standing with A/D and Supervision.--met  Toileting from bedside commode with Modified [place] for hygiene and clothing management. --met on toilet with RW and grab bar  Bathing from  shower chair/bench with Supervision.  Supine to sit with Modified [place].  Stand pivot transfers with Modified [place] with RW.  Pt will tolerate up to 10 minutes of functional standing activity with A/D to steady and (S).  Pt's caregiver will be competent with assisting with self care tasks and functional mobility.      Outcome: Ongoing (interventions implemented as appropriate)  Progressing. BRANDEE Berg  10/10/2018

## 2018-10-10 NOTE — PLAN OF CARE
Problem: Patient Care Overview  Goal: Plan of Care Review  Outcome: Revised  Repositions independently, no new skin breakdowns noted. Afebrile. Monitored for pain and safety.safety maintained. Denies pain. Friday discharge planning in progress

## 2018-10-10 NOTE — NURSING
Notified WILFRED Herrmann of pt's request to speak with her to coordinate discharge and therapy family training.

## 2018-10-10 NOTE — PLAN OF CARE
Problem: Physical Therapy Goal  Goal: Physical Therapy Goal  Goals to be met by: 9 days     Patient will increase functional independence with mobility by performin. Supine to sit with Modified [place] = met 10/8/2018  2. Sit to supine with Modified [place] = met 10/8/2018  3. Sit to stand transfer with Supervision using the least restrictive device. met  4. Bed to chair transfer with Supervision using the least restrictive device. met  5. Gait  x 80 feet with Supervision using the least restrictive device.met  6. Ascend/descend 8 steps with bilateral Handrails Stand-by Assistance. met  7. Ascend/Descend 4 inch curb step with Supervision using the least restrictive device.  8. Stand for 3 minutes with Stand-by Assistance using the least restrictive device.  9. Lower extremity exercise program x20 reps per handout, with assistance as needed  NEW GOAL 10/8/2018 :  10. Gait x 200 feet w/ Supervision w/ rollator = not met       Outcome: Ongoing (interventions implemented as appropriate)  Goals remain appropriate

## 2018-10-10 NOTE — PT/OT/SLP PROGRESS
"Physical Therapy  Treatment    Ewelina Arnold   MRN: [id number]   Admitting Diagnosis: Debility    PT Received On: 10/10/18          Billable Minutes:  Gait Training 18, Therapeutic Activity 12 and Therapeutic Exercise 23    Treatment Type: Treatment  PT/PTA: PTA     PTA Visit Number: 2       General Precautions: Standard, fall  Orthopedic Precautions: N/A   Braces: N/A         Subjective:  "you ready for me?"      Pain/Comfort  Pain Rating 1: 0/10  Pain Rating Post-Intervention 1: 0/10    Objective:  Patient found in [facility]     AM-PAC 6 CLICK MOBILITY  Total Score:19      Transfers:  Sit<>Stand: with RW and rollator S from WC and BSC, SBA/S to/from rollator pt remembers brakes  Stand Pivot Transfer: with RW/rollator S      Gait:  Amb with RW in room ~ 15 ft BSC>WC then 5 ft no AD SBA WC>BSC, in gym S with rollator ~ 65 ft, 25 ft, 100 ft all with seated rest breaks no LOB     Advanced Gait:  Stairs: asc/dec 4 steps x 2 trials BHR close S  Curb Step: asc/momo 4" curb with rollator SBA    Therex:  X 10 reps standing with BUE support heel raises,mini squats,MIP,abd/add    Additional Treatment:  Discussed fmly trng prior to D/C with erica on Friday  Recumbent cross  x 16 min L-3    Patient left up in chair with call button in reach and belongings in reach.    Assessment:  Ewelina Arnold is a 72 y.o. female with a medical diagnosis of Debility.  Pt tolerated well, pt is pleasant demo good effort, plan tomorrow some gait without AD/balance activity, pt would continue to benefit from skilled PT services to improve overall functional mobility, strength and endurance.  .    Rehab identified problem list/impairments: weakness, impaired endurance, impaired functional mobilty, gait instability, impaired balance, decreased lower extremity function, decreased coordination, edema    Rehab potential is good.    Activity tolerance: Fair    Discharge recommendations: home with home health     Barriers to discharge: Inaccessible home " environment, Decreased caregiver support    Equipment recommendations: shower chair     GOALS:   Multidisciplinary Problems     Physical Therapy Goals        Problem: Physical Therapy Goal    Goal Priority Disciplines Outcome Goal Variances Interventions   Physical Therapy Goal     PT, PT/OT Ongoing (interventions implemented as appropriate)     Description:  Goals to be met by: 9 days     Patient will increase functional independence with mobility by performin. Supine to sit with Modified [place] = met 10/8/2018  2. Sit to supine with Modified [place] = met 10/8/2018  3. Sit to stand transfer with Supervision using the least restrictive device. met  4. Bed to chair transfer with Supervision using the least restrictive device. met  5. Gait  x 80 feet with Supervision using the least restrictive device.met  6. Ascend/descend 8 steps with bilateral Handrails Stand-by Assistance. met  7. Ascend/Descend 4 inch curb step with Supervision using the least restrictive device.  8. Stand for 3 minutes with Stand-by Assistance using the least restrictive device.  9. Lower extremity exercise program x20 reps per handout, with assistance as needed  NEW GOAL 10/8/2018 :  10. Gait x 200 feet w/ Supervision w/ rollator = not met                         PLAN:    Patient to be seen 5 x/week  to address the above listed problems via gait training, therapeutic activities, therapeutic exercises  Plan of Care expires: 18  Plan of Care reviewed with: patient    Gladis Goodrich, PTA  10/10/2018

## 2018-10-11 LAB
ANION GAP SERPL CALC-SCNC: 9 MMOL/L
BASOPHILS # BLD AUTO: 0.04 K/UL
BASOPHILS NFR BLD: 0.7 %
BUN SERPL-MCNC: 78 MG/DL
CALCIUM SERPL-MCNC: 8.4 MG/DL
CHLORIDE SERPL-SCNC: 106 MMOL/L
CO2 SERPL-SCNC: 26 MMOL/L
CREAT SERPL-MCNC: 3 MG/DL
DIFFERENTIAL METHOD: ABNORMAL
EOSINOPHIL # BLD AUTO: 0 K/UL
EOSINOPHIL NFR BLD: 0 %
ERYTHROCYTE [DISTWIDTH] IN BLOOD BY AUTOMATED COUNT: 13.7 %
EST. GFR  (AFRICAN AMERICAN): 17.2 ML/MIN/1.73 M^2
EST. GFR  (NON AFRICAN AMERICAN): 14.9 ML/MIN/1.73 M^2
GLUCOSE SERPL-MCNC: 65 MG/DL
HCT VFR BLD AUTO: 26.9 %
HGB BLD-MCNC: 8 G/DL
IMM GRANULOCYTES # BLD AUTO: 0.01 K/UL
IMM GRANULOCYTES NFR BLD AUTO: 0.2 %
LYMPHOCYTES # BLD AUTO: 1.4 K/UL
LYMPHOCYTES NFR BLD: 25.2 %
MAGNESIUM SERPL-MCNC: 1.6 MG/DL
MCH RBC QN AUTO: 26.1 PG
MCHC RBC AUTO-ENTMCNC: 29.7 G/DL
MCV RBC AUTO: 88 FL
MONOCYTES # BLD AUTO: 0.7 K/UL
MONOCYTES NFR BLD: 12.7 %
NEUTROPHILS # BLD AUTO: 3.3 K/UL
NEUTROPHILS NFR BLD: 61.2 %
NRBC BLD-RTO: 0 /100 WBC
PHOSPHATE SERPL-MCNC: 2.9 MG/DL
PLATELET # BLD AUTO: 355 K/UL
PMV BLD AUTO: 10.2 FL
POTASSIUM SERPL-SCNC: 3.9 MMOL/L
RBC # BLD AUTO: 3.06 M/UL
SODIUM SERPL-SCNC: 141 MMOL/L
WBC # BLD AUTO: 5.36 K/UL

## 2018-10-11 PROCEDURE — 97110 THERAPEUTIC EXERCISES: CPT

## 2018-10-11 PROCEDURE — 11000004 HC SNF PRIVATE

## 2018-10-11 PROCEDURE — 63600175 PHARM REV CODE 636 W HCPCS: Performed by: HOSPITALIST

## 2018-10-11 PROCEDURE — 85025 COMPLETE CBC W/AUTO DIFF WBC: CPT

## 2018-10-11 PROCEDURE — 97116 GAIT TRAINING THERAPY: CPT

## 2018-10-11 PROCEDURE — 83735 ASSAY OF MAGNESIUM: CPT

## 2018-10-11 PROCEDURE — 80048 BASIC METABOLIC PNL TOTAL CA: CPT

## 2018-10-11 PROCEDURE — 25000003 PHARM REV CODE 250: Performed by: NURSE PRACTITIONER

## 2018-10-11 PROCEDURE — 97535 SELF CARE MNGMENT TRAINING: CPT

## 2018-10-11 PROCEDURE — 84100 ASSAY OF PHOSPHORUS: CPT

## 2018-10-11 PROCEDURE — 97530 THERAPEUTIC ACTIVITIES: CPT

## 2018-10-11 PROCEDURE — 25000003 PHARM REV CODE 250: Performed by: HOSPITALIST

## 2018-10-11 PROCEDURE — 36415 COLL VENOUS BLD VENIPUNCTURE: CPT

## 2018-10-11 RX ORDER — NIFEDIPINE 60 MG/1
60 TABLET, EXTENDED RELEASE ORAL 2 TIMES DAILY
Qty: 60 TABLET | Refills: 3 | Status: SHIPPED | OUTPATIENT
Start: 2018-10-11 | End: 2018-12-11 | Stop reason: SDUPTHER

## 2018-10-11 RX ORDER — AMOXICILLIN 250 MG
1 CAPSULE ORAL 2 TIMES DAILY
COMMUNITY
Start: 2018-10-11 | End: 2018-10-30

## 2018-10-11 RX ORDER — DOXAZOSIN 2 MG/1
2 TABLET ORAL NIGHTLY
Qty: 30 TABLET | Refills: 3 | Status: SHIPPED | OUTPATIENT
Start: 2018-10-11 | End: 2018-10-30 | Stop reason: SDUPTHER

## 2018-10-11 RX ORDER — ATORVASTATIN CALCIUM 80 MG/1
80 TABLET, FILM COATED ORAL DAILY
Qty: 90 TABLET | Refills: 0 | Status: SHIPPED | OUTPATIENT
Start: 2018-10-12 | End: 2018-11-30 | Stop reason: SDUPTHER

## 2018-10-11 RX ORDER — SODIUM BICARBONATE 650 MG/1
650 TABLET ORAL 2 TIMES DAILY
Qty: 60 TABLET | Refills: 3 | COMMUNITY
Start: 2018-10-11 | End: 2018-12-11 | Stop reason: SDUPTHER

## 2018-10-11 RX ADMIN — PANTOPRAZOLE SODIUM 40 MG: 40 TABLET, DELAYED RELEASE ORAL at 08:10

## 2018-10-11 RX ADMIN — CITALOPRAM HYDROBROMIDE 30 MG: 20 TABLET ORAL at 08:10

## 2018-10-11 RX ADMIN — DOXAZOSIN 2 MG: 1 TABLET ORAL at 09:10

## 2018-10-11 RX ADMIN — CARVEDILOL 6.25 MG: 6.25 TABLET, FILM COATED ORAL at 09:10

## 2018-10-11 RX ADMIN — ASPIRIN 81 MG: 81 TABLET, COATED ORAL at 08:10

## 2018-10-11 RX ADMIN — SODIUM BICARBONATE 650 MG TABLET 650 MG: at 08:10

## 2018-10-11 RX ADMIN — HYDROXYCHLOROQUINE SULFATE 200 MG: 200 TABLET, FILM COATED ORAL at 09:10

## 2018-10-11 RX ADMIN — NIFEDIPINE 60 MG: 30 TABLET, FILM COATED, EXTENDED RELEASE ORAL at 08:10

## 2018-10-11 RX ADMIN — LEVOTHYROXINE SODIUM 75 MCG: 75 TABLET ORAL at 05:10

## 2018-10-11 RX ADMIN — NIFEDIPINE 60 MG: 30 TABLET, FILM COATED, EXTENDED RELEASE ORAL at 09:10

## 2018-10-11 RX ADMIN — SODIUM BICARBONATE 650 MG TABLET 650 MG: at 09:10

## 2018-10-11 RX ADMIN — HYDROXYCHLOROQUINE SULFATE 200 MG: 200 TABLET, FILM COATED ORAL at 08:10

## 2018-10-11 RX ADMIN — LATANOPROST 1 DROP: 50 SOLUTION OPHTHALMIC at 08:10

## 2018-10-11 RX ADMIN — ATORVASTATIN CALCIUM 80 MG: 20 TABLET, FILM COATED ORAL at 08:10

## 2018-10-11 RX ADMIN — ISOSORBIDE MONONITRATE 60 MG: 30 TABLET, EXTENDED RELEASE ORAL at 08:10

## 2018-10-11 RX ADMIN — HEPARIN SODIUM 5000 UNITS: 5000 INJECTION, SOLUTION INTRAVENOUS; SUBCUTANEOUS at 09:10

## 2018-10-11 NOTE — PLAN OF CARE
[facility] - Skilled Nursing Care    HOME HEALTH ORDERS  FACE TO FACE ENCOUNTER    Patient Name: Ewelina Arnold  YOB: 1945    PCP: Amarilys Johns MD   [facility] [place] [address] [facility] [address]  PCP Phone Number: [phone]  PCP Fax: [phone]    Encounter Date: 10/11/2018    Admit to Home Health    Diagnoses:  Active Hospital Problems    Diagnosis  POA    *Debility [R53.81]  Yes    Vitamin D deficiency [E55.9]  Yes    Metabolic acidosis, normal anion gap (NAG) [E87.2]  Yes    Anemia in stage 4 chronic kidney disease [N18.4, D63.1]  Yes    Major depressive disorder with single episode, in full remission [F32.5]  Yes    Malnutrition of moderate degree [E44.0]  Yes    CKD (chronic kidney disease), stage IV [N18.4]  Yes     Donated kidney to her brother  1/23/2017 patient continues to follow-up with nephrology awaiting renal panel will contact patient results available.  We'll continue to monitor      Left ventricular diastolic dysfunction with preserved systolic function [I51.9]  Yes     Low EF, aortic stenosis on recent echo      GERD (gastroesophageal reflux disease) [K21.9]  Yes    Dyslipidemia [E78.5]  Yes    Acquired hypothyroidism [E03.9]  Yes     TSH controlled on supplement      Edema [R60.9]  Yes    Residual stage angle-closure glaucoma - Both Eyes [H40.249]  Yes    Coronary artery disease due to calcified coronary lesion [I25.10, I25.84]  Yes    Resistant hypertension [I10]  Yes     Newly compliant with BP and CHF meds      Other forms of systemic lupus erythematosus [M32.8]  Yes     Chronic     Early 1990's developed acute respiratory failure and spent weeks in ICU [facility]   Positive CARYN, SSB  Hx Rash, leukopenia,         Resolved Hospital Problems   No resolved problems to display.       Future Appointments   Date Time Provider Department Center   10/26/2018  9:00 AM Curt Epperson MD [facility] NEPHRO [address]   10/30/2018  9:30 AM ANNUAL WELLNESS  VISIT-NURSE PRACTITIONER, NOM 2 [facility] IM [address] PCW   10/30/2018 11:00 AM Amarilys Johns MD [facility] MED CLN [address] [facility]W   11/6/2018  9:00 AM Yeimi Burdick MD [facility] HEARTTX [address]           I have seen and examined this patient face to face today. My clinical findings that support the need for the home health skilled services and home bound status are the following:  Weakness/numbness causing balance and gait disturbance due to Heart Failure and Weakness/Debility making it taxing to leave home.    Allergies:  Review of patient's allergies indicates:   Allergen Reactions    Ace inhibitors Swelling     Lips Swelling    Hydralazine analogues Other (See Comments)     Lupus exacerbation    Imodium [loperamide] Nausea And Vomiting    Vioxx [rofecoxib] Swelling      lips swelling    Bactrim [sulfamethoxazole-trimethoprim]      Pt would like this medication to be added due to elevation of creatinine with single kidney.    Clonidine Hallucinations     Hallucinations    Lactose     Arthrotec 50 [diclofenac-misoprostol]      Unknown    Bentyl [dicyclomine] Other (See Comments)     Not effective    Doxycycline Nausea Only    Lomotil [diphenoxylate-atropine] Nausea And Vomiting     Stomach cramps with vomitting    Lozol [indapamide] Rash    Norvasc [amlodipine]      Not tolerated    Tramadol Nausea Only       Diet: cardiac diet and fluid restriction: 1500 mL    Activities: activity as tolerated    Nursing:   SN to complete comprehensive assessment including routine vital signs. Instruct on disease process and s/s of complications to report to MD. Review/verify medication list sent home with the patient at time of discharge  and instruct patient/caregiver as needed. Frequency may be adjusted depending on start of care date.    Notify MD if SBP > 160 or < 90; DBP > 90 or < 50; HR > 120 or < 50; Temp > 101;       CONSULTS:    Physical Therapy to evaluate and treat. Evaluate for home safety and equipment  needs; Establish/upgrade home exercise program. Perform / instruct on therapeutic exercises, gait training, transfer training, and Range of Motion.  Occupational Therapy to evaluate and treat. Evaluate home environment for safety and equipment needs. Perform/Instruct on transfers, ADL training, ROM, and therapeutic exercises.  Aide to provide assistance with personal care, ADLs, and vital signs.    MISCELLANEOUS CARE:  Heart Failure:      SN to instruct on the following:    Instruct on the definition of CHF.   Instruct on the signs/sympoms of CHF to be reported.   Instruct on and monitor daily weights.   Instruct on factors that cause exacerbation.   Instruct on action, dose, schedule, and side effects of medications.   Instruct on diet as prescribed.   Instruct on activity allowed.   Instruct on life-style modifications for life long management of CHF   SN to assess compliance with daily weights, diet, medications, fluid retention,    safety precautions, activities permitted and life-style modifications.   Additional 1-2 SN visits per week as needed for signs and symptoms     of CHF exacerbation.      For Weight Gain > 2-3 lbs in 1 day or 4-6 lbs over 1 week notify PCP:      WOUND CARE ORDERS  n/a      Medications: Review discharge medications with patient and family and provide education.      Current Discharge Medication List      START taking these medications    Details   atorvastatin (LIPITOR) 80 MG tablet Take 1 tablet (80 mg total) by mouth once daily.  Qty: 90 tablet, Refills: 0      senna-docusate 8.6-50 mg (PERICOLACE) 8.6-50 mg per tablet Take 1 tablet by mouth 2 (two) times daily.         CONTINUE these medications which have CHANGED    Details   doxazosin (CARDURA) 2 MG tablet Take 1 tablet (2 mg total) by mouth every evening.  Qty: 30 tablet, Refills: 3      NIFEdipine (PROCARDIA-XL) 60 MG (OSM) 24 hr tablet Take 1 tablet (60 mg total) by mouth 2 (two) times daily.  Qty: 60 tablet, Refills: 3       sodium bicarbonate 650 MG tablet Take 1 tablet (650 mg total) by mouth 2 (two) times daily.  Qty: 60 tablet, Refills: 3         CONTINUE these medications which have NOT CHANGED    Details   aspirin (ECOTRIN) 81 MG EC tablet Take 1 tablet (81 mg total) by mouth once daily.  Qty: 30 tablet, Refills: 0      calcipotriene 0.005 % sclp soln Apply 1 application topically 2 (two) times daily.  Qty: 60 mL, Refills: 3    Associated Diagnoses: Psoriasis      carvedilol (COREG) 6.25 MG tablet Take 1 tablet (6.25 mg total) by mouth 2 (two) times daily.  Qty: 60 tablet, Refills: 11      citalopram (CELEXA) 20 MG tablet TAKE 1 AND 1/2  TABLETS (30 MG TOTAL) ONCE DAILY.  Qty: 135 tablet, Refills: 1      clobetasol (TEMOVATE) 0.05 % external solution Apply topically 2 (two) times daily.  Qty: 50 mL, Refills: 2    Associated Diagnoses: Scalp pruritus      ergocalciferol (VITAMIN D2) 50,000 unit Cap Take 1 capsule (50,000 Units total) by mouth every 7 days.  Qty: 12 capsule, Refills: 3    Associated Diagnoses: Hypovitaminosis D      famotidine (PEPCID) 20 MG tablet Take 1 tablet (20 mg total) by mouth once daily.  Qty: 90 tablet, Refills: 3      furosemide (LASIX) 40 MG tablet Take 1 tablet (40 mg total) by mouth daily as needed (for weight gain, shortness of breath).  Qty: 90 tablet, Refills: 3    Associated Diagnoses: Acute on chronic diastolic congestive heart failure      hydroxychloroquine (PLAQUENIL) 200 mg tablet TAKE 1 TABLET TWICE DAILY  Qty: 180 tablet, Refills: 1    Associated Diagnoses: Lupus (systemic lupus erythematosus)      isosorbide mononitrate (IMDUR) 60 MG 24 hr tablet Take 1 tablet (60 mg total) by mouth once daily.  Qty: 90 tablet, Refills: 3      latanoprost 0.005 % ophthalmic solution INSTILL 1 DROP INTO BOTH EYES EVERY DAY  Qty: 3 Bottle, Refills: 3      levothyroxine (SYNTHROID) 75 MCG tablet Take 1 tablet (75 mcg total) by mouth before breakfast.  Qty: 90 tablet, Refills: 3    Associated Diagnoses:  Hypothyroidism, unspecified type         STOP taking these medications       butalbital-aspirin-caffeine -40 mg (FIORINAL) -40 mg Cap Comments:   Reason for Stopping:         dicyclomine (BENTYL) 10 MG capsule Comments:   Reason for Stopping:         rosuvastatin (CRESTOR) 10 MG tablet Comments:   Reason for Stopping:         furosemide (LASIX) 10 mg/mL injection Comments:   Reason for Stopping:         metOLazone (ZAROXOLYN) 2.5 MG tablet Comments:   Reason for Stopping:         mometasone (ELOCON) 0.1 % ointment Comments:   Reason for Stopping:               I certify that this patient is confined to her home and needs physical therapy and occupational therapy.

## 2018-10-11 NOTE — TREATMENT PLAN
Rehab Services' DME recommendations    Ewelina Arnold  MRN: [id number]    [x]  No DME needed    [x] Home health PT and OT  BRANDEE Pruitt 10/11/2018

## 2018-10-11 NOTE — PLAN OF CARE
Problem: Physical Therapy Goal  Goal: Physical Therapy Goal  Goals to be met by: 9 days     Patient will increase functional independence with mobility by performin. Supine to sit with Modified [place] = met 10/8/2018  2. Sit to supine with Modified [place] = met 10/8/2018  3. Sit to stand transfer with Supervision using the least restrictive device. met  4. Bed to chair transfer with Supervision using the least restrictive device. met  5. Gait  x 80 feet with Supervision using the least restrictive device.met  6. Ascend/descend 8 steps with bilateral Handrails Stand-by Assistance. met  7. Ascend/Descend 4 inch curb step with Supervision using the least restrictive device.  8. Stand for 3 minutes with Stand-by Assistance using the least restrictive device.  9. Lower extremity exercise program x20 reps per handout, with assistance as needed  NEW GOAL 10/8/2018 :  10. Gait x 200 feet w/ Supervision w/ rollator = not met        Goals remain appropriate. Continue with PT POC as indicated.

## 2018-10-11 NOTE — PLAN OF CARE
Problem: Patient Care Overview  Goal: Plan of Care Review  Outcome: Revised  Repositions independently, no new skin breakdowns noted. Afebrile. Monitored for pain and safety. Safety maintained. Denies pain. Discharge planning for tomorrow 10/12/18

## 2018-10-11 NOTE — PT/OT/SLP PROGRESS
Physical Therapy  Treatment    Ewelina Arnold   MRN: [id number]   Admitting Diagnosis: Debility    PT Received On: 10/11/18  Total Time (min): 45       Billable Minutes:  Gait Training 10, Therapeutic Activity 15 and Therapeutic Exercise 20    Treatment Type: Treatment  PT/PTA: PTA     PTA Visit Number: 3       General Precautions: Standard, fall  Orthopedic Precautions: N/A   Braces: N/A         Subjective:  Communicated with nursing prior to session.  Pt agreed to work with therapy.     Pain/Comfort  Pain Rating 1: 0/10  Pain Rating Post-Intervention 1: 0/10    Objective:  Patient found seated bedside chair.       AM-PAC 6 CLICK MOBILITY  Total Score:19    Bed Mobility:  Sit>Supine:Mod I on mat  Supine>Sit: Mod I on mat    Transfers:  Sit<>Stand: with RW and rollator SPV to/from w/c, to/from bedside chair  Stand Pivot Transfer: with RW/rollator S     Gait:  Amb 180 ft w/ RW and SPV  Amb 100 ft w/ rollator and SPV     Therex:  -Seated B LE therex x20 reps:    -AP   -LAQ   -hip flexion  -Supine B LE therex x20 reps:    -AP   -SAQ   -Abd/Add   -QS   -Bridges     Additional Treatment:  -Recumbent Stepper x15 min L4.    Patient left up in chair with call button in reach and nursing notified.    Assessment:  Ewelina Arnold is a 72 y.o. female with a medical diagnosis of Debility.  Pt tolerated treatment well, and will continue to benefit from PT services at this time. Continue with PT POC as indicated.    Rehab identified problem list/impairments: weakness, impaired endurance, impaired functional mobilty, gait instability, impaired balance, decreased lower extremity function, decreased coordination, edema    Rehab potential is good.    Activity tolerance: Good    Discharge recommendations: home with home health     Barriers to discharge: Inaccessible home environment, Decreased caregiver support    Equipment recommendations: shower chair     GOALS:   Multidisciplinary Problems     Physical Therapy Goals        Problem:  Physical Therapy Goal    Goal Priority Disciplines Outcome Goal Variances Interventions   Physical Therapy Goal     PT, PT/OT Ongoing (interventions implemented as appropriate)     Description:  Goals to be met by: 9 days     Patient will increase functional independence with mobility by performin. Supine to sit with Modified [place] = met 10/8/2018  2. Sit to supine with Modified [place] = met 10/8/2018  3. Sit to stand transfer with Supervision using the least restrictive device. met  4. Bed to chair transfer with Supervision using the least restrictive device. met  5. Gait  x 80 feet with Supervision using the least restrictive device.met  6. Ascend/descend 8 steps with bilateral Handrails Stand-by Assistance. met  7. Ascend/Descend 4 inch curb step with Supervision using the least restrictive device.  8. Stand for 3 minutes with Stand-by Assistance using the least restrictive device.  9. Lower extremity exercise program x20 reps per handout, with assistance as needed  NEW GOAL 10/8/2018 :  10. Gait x 200 feet w/ Supervision w/ rollator = not met                         PLAN:    Patient to be seen 5 x/week  to address the above listed problems via gait training, therapeutic activities, therapeutic exercises  Plan of Care expires: 18  Plan of Care reviewed with: patient    Abbie Surya, PTA  10/11/2018

## 2018-10-11 NOTE — PLAN OF CARE
Problem: Fall Risk (Adult)  Goal: Identify Related Risk Factors and Signs and Symptoms  Related risk factors and signs and symptoms are identified upon initiation of Human Response Clinical Practice Guideline (CPG)  Outcome: Ongoing (interventions implemented as appropriate)   10/11/18 0318   Fall Risk   Related Risk Factors (Fall Risk) fatigue/slow reaction;gait/mobility prob   10/11/18 0318   Fall Risk   Related Risk Factors (Fall Risk) fatigue/slow reaction;gait/mobility problems   chanel

## 2018-10-11 NOTE — PLAN OF CARE
Problem: Occupational Therapy Goal  Goal: Occupational Therapy Goal  Goals to be met by: 11 days     Patient will increase functional independence with ADLs by performing:    UE Dressing with Modified [place].  LE Dressing with Supervision.  Grooming while standing with A/D and Supervision.--met  Toileting from bedside commode with Modified [place] for hygiene and clothing management. --met on toilet with RW and grab bar  Bathing from  shower chair/bench with Supervision.  Supine to sit with Modified [place].  Stand pivot transfers with Modified [place] with RW.  Pt will tolerate up to 10 minutes of functional standing activity with A/D to steady and (S).  Pt's caregiver will be competent with assisting with self care tasks and functional mobility.      Pt. Tolerated session well.

## 2018-10-11 NOTE — PT/OT/SLP PROGRESS
Occupational Therapy  Treatment    Ewelina Arnold   MRN: [id number]   Admitting Diagnosis: Debility    OT Date of Treatment: 10/11/18  Total Time (min): 48 min    Billable Minutes:  Self Care/Home Management 15, Therapeutic Activity 13 and Therapeutic Exercise 20    General Precautions: Standard, fall  Orthopedic Precautions: N/A  Braces: N/A    Do you have any cultural, spiritual, Pentecostal conflicts, given your current situation?: None stated    Subjective:  Communicated with nurse prior to session.  Pt. Reported she wanted to know what time she was leaving tomorrow in order to coordinate with daughter; SW notified    Pain/Comfort  Pain Rating 1: 0/10  Pain Rating Post-Intervention 1: 0/10    Objective:  Patient found with: (seated in bedside chair)    Occupational Performance:    Bed Mobility:    · Not tested     Functional Mobility/Transfers:  · Patient completed Sit <> Stand Transfer with modified independence  with  rolling walker   · Patient completed Tub Transfer Stand Pivot technique with stand by assistance with rolling walker  · Functional Mobility: pt. Performed functional mobility in room with use of RW and mod I    Activities of Daily Living:  · Not performed    [facility] 6 Click:  [facility] Total Score: 23    OT Exercises: AROM with 2 # dowel x 2 sets 10 reps for all major planes of BUE motion  UE Ergometer 10 minutes with min resistance   All there ex were performed to improve overall level of endurance and strengthen arms to assist with transfers    Additional Treatment:3  Pt. Educated on safety with showers and OT recommended TTB for safety/. OT educated pt. On transfer to TTB and technique.  Pt. Required CGA for task completion.  Pt. Also engaged in dynamic standing activity at counter of placing and removing items from overhead cabinets  Pt. Educated on stabilizing with 1 hand on counter while removing items.  Pt. Also educated on use of RW bag to assist with transporting small items in kitchen or in bedroom  when performing IADL tasks in order to utilize BUE to hold RW.     Patient left up in chair with call button in reach and nurse notified    ASSESSMENT:  Ewelina Arnold is a 72 y.o. female with a medical diagnosis of Debility  And presents with deficits in higher level IADL and ADL tasks.  Pt. Would benefit from continued OT services to maximize safety and independence with ADL tasks. .    Rehab identified problem list/impairments: weakness, impaired endurance, impaired self care skills, impaired functional mobilty, impaired balance, decreased lower extremity function, decreased safety awareness, impaired cardiopulmonary response to activity    Rehab potential is good    Activity tolerance: Good    Discharge recommendations: home with home health     Barriers to discharge: Inaccessible home environment, Decreased caregiver support     Equipment recommendations: (TBD)     GOALS:   Multidisciplinary Problems     Occupational Therapy Goals        Problem: Occupational Therapy Goal    Goal Priority Disciplines Outcome Interventions   Occupational Therapy Goal     OT, PT/OT Ongoing (interventions implemented as appropriate)    Description:  Goals to be met by: 11 days     Patient will increase functional independence with ADLs by performing:    UE Dressing with Modified [place].  LE Dressing with Supervision.  Grooming while standing with A/D and Supervision.--met  Toileting from bedside commode with Modified [place] for hygiene and clothing management. --met on toilet with RW and grab bar  Bathing from  shower chair/bench with Supervision.  Supine to sit with Modified [place].  Stand pivot transfers with Modified [place] with RW.  Pt will tolerate up to 10 minutes of functional standing activity with A/D to steady and (S).  Pt's caregiver will be competent with assisting with self care tasks and functional mobility.                       Plan:  Patient to be seen 6 x/week, 5 x/week to address the above  listed problems via self-care/home management, therapeutic activities, therapeutic exercises  Plan of Care expires: 11/05/18  Plan of Care reviewed with: patient    BRANDEE Pruitt  10/11/2018

## 2018-10-11 NOTE — PLAN OF CARE
Problem: Occupational Therapy Goal  Goal: Occupational Therapy Goal  Goals to be met by: 11 days     Patient will increase functional independence with ADLs by performing:    UE Dressing with Modified [place].  LE Dressing with Supervision.  Grooming while standing with A/D and Supervision.--met  Toileting from bedside commode with Modified [place] for hygiene and clothing management. --met on toilet with RW and grab bar  Bathing from  shower chair/bench with Supervision.  Supine to sit with Modified [place].  Stand pivot transfers with Modified [place] with RW.  Pt will tolerate up to 10 minutes of functional standing activity with A/D to steady and (S).  Pt's caregiver will be competent with assisting with self care tasks and functional mobility.      Pt. Tolerated session well.

## 2018-10-12 VITALS
HEART RATE: 66 BPM | HEIGHT: 64 IN | WEIGHT: 109.56 LBS | TEMPERATURE: 98 F | RESPIRATION RATE: 18 BRPM | SYSTOLIC BLOOD PRESSURE: 158 MMHG | BODY MASS INDEX: 18.71 KG/M2 | OXYGEN SATURATION: 100 % | DIASTOLIC BLOOD PRESSURE: 73 MMHG

## 2018-10-12 PROCEDURE — 99315 NF DSCHRG MGMT 30 MIN/LESS: CPT | Mod: ,,, | Performed by: INTERNAL MEDICINE

## 2018-10-12 PROCEDURE — 25000003 PHARM REV CODE 250: Performed by: HOSPITALIST

## 2018-10-12 PROCEDURE — 63600175 PHARM REV CODE 636 W HCPCS: Performed by: HOSPITALIST

## 2018-10-12 PROCEDURE — 97110 THERAPEUTIC EXERCISES: CPT

## 2018-10-12 PROCEDURE — 97530 THERAPEUTIC ACTIVITIES: CPT

## 2018-10-12 PROCEDURE — 97116 GAIT TRAINING THERAPY: CPT

## 2018-10-12 PROCEDURE — 25000003 PHARM REV CODE 250: Performed by: NURSE PRACTITIONER

## 2018-10-12 RX ORDER — FUROSEMIDE 20 MG/1
20 TABLET ORAL DAILY
Status: COMPLETED | OUTPATIENT
Start: 2018-10-12 | End: 2018-10-12

## 2018-10-12 RX ADMIN — CARVEDILOL 6.25 MG: 6.25 TABLET, FILM COATED ORAL at 09:10

## 2018-10-12 RX ADMIN — NIFEDIPINE 60 MG: 30 TABLET, FILM COATED, EXTENDED RELEASE ORAL at 09:10

## 2018-10-12 RX ADMIN — ISOSORBIDE MONONITRATE 60 MG: 30 TABLET, EXTENDED RELEASE ORAL at 09:10

## 2018-10-12 RX ADMIN — PANTOPRAZOLE SODIUM 40 MG: 40 TABLET, DELAYED RELEASE ORAL at 09:10

## 2018-10-12 RX ADMIN — SODIUM BICARBONATE 650 MG TABLET 650 MG: at 09:10

## 2018-10-12 RX ADMIN — LATANOPROST 1 DROP: 50 SOLUTION OPHTHALMIC at 09:10

## 2018-10-12 RX ADMIN — CITALOPRAM HYDROBROMIDE 30 MG: 20 TABLET ORAL at 09:10

## 2018-10-12 RX ADMIN — ASPIRIN 81 MG: 81 TABLET, COATED ORAL at 09:10

## 2018-10-12 RX ADMIN — ATORVASTATIN CALCIUM 80 MG: 20 TABLET, FILM COATED ORAL at 09:10

## 2018-10-12 RX ADMIN — HEPARIN SODIUM 5000 UNITS: 5000 INJECTION, SOLUTION INTRAVENOUS; SUBCUTANEOUS at 07:10

## 2018-10-12 RX ADMIN — LEVOTHYROXINE SODIUM 75 MCG: 75 TABLET ORAL at 07:10

## 2018-10-12 RX ADMIN — FUROSEMIDE 20 MG: 20 TABLET ORAL at 10:10

## 2018-10-12 RX ADMIN — HYDROXYCHLOROQUINE SULFATE 200 MG: 200 TABLET, FILM COATED ORAL at 09:10

## 2018-10-12 NOTE — ASSESSMENT & PLAN NOTE
Dietary consult     10/8/18  Dietary consulted.  Patient does not like food being served.  She is lactose intolerant, spoke with dietary regarding obtaining supplement that is lactose free.     10/12/18  Patient is lactose intolerant.  Weights were monitored.  Dietary was consulted.  Patient is eating ~75% of meals served.

## 2018-10-12 NOTE — ASSESSMENT & PLAN NOTE
Cont with PT/OT for gait training and strengthening and restoration of ADL's   Encourage mobility, OOB in chair, and early ambulation as appropriate  Fall precautions   Monitor for bowel and bladder dysfunction  Monitor for and prevent skin breakdown and pressure ulcers  Cont DVT prophylaxis with heparin   Anticoagulants   Medication Route Frequency    heparin (porcine) injection 5,000 Units Subcutaneous Q8H     10/8/18  Continue therapy to improve functional strength.  Heparin for dvt ppx as ordered.  Plans to discharge home with family help.    10/12/18  Progressed with therapy.  Therapy recommends home with home health.

## 2018-10-12 NOTE — PLAN OF CARE
Problem: Patient Care Overview  Goal: Plan of Care Review  Outcome: Outcome(s) achieved Date Met: 10/12/18  FALL PRECAUTIONS MAINTAINED NO INJURIES NOTED.NO PRESSURE ULCERS NOTED.

## 2018-10-12 NOTE — PT/OT/SLP PROGRESS
Physical Therapy  Treatment    Ewelina Arnold   MRN: [id number]   Admitting Diagnosis: Debility    PT Received On: 10/12/18  Total Time (min): 23       Billable Minutes:  Gait Training 10 and Therapeutic Activity 13    Treatment Type: Treatment  PT/PTA: PTA     PTA Visit Number: 4       General Precautions: Standard, fall  Orthopedic Precautions: N/A   Braces: N/A    Subjective:  Communicated with nursing prior to session.  Pt agreed to work with therapy.     Pain/Comfort  Pain Rating 1: 0/10  Pain Rating Post-Intervention 1: 0/10    Objective:  Patient found seated bedside chair.        AM-PAC 6 CLICK MOBILITY  Total Score:19    Bed Mobility:    Transfers:  Sit<>Stand: SPV w/ RW   Stand Pivot Transfer: SPV w/ RW     Gait:  Amb x2 trials (~200 ft and 123 ft) w/ RW and SPV. No LOB noted.     Advanced Gait:  Stairs: ascend/descend 8 steps w/ BHR and SPV  Curb Step: ascend/descend 4 inch curb step w/ RW and SPV    Additional Treatment:  -Family training completed on this date. Discussed pts current level of functional mobility.      Patient left up in chair with call button in reach, nursing notified and family member present.    Assessment:  Ewelina Arnold is a 72 y.o. female with a medical diagnosis of Debility.  Pt tolerated treatment well. Pt will continue to benefit from PT services at this time. Continue with PT POC as indicated.    Rehab identified problem list/impairments: weakness, impaired endurance, impaired functional mobilty, gait instability, impaired balance, decreased lower extremity function, decreased coordination, edema    Rehab potential is good.    Activity tolerance: Good    Discharge recommendations: home with home health     Barriers to discharge: Inaccessible home environment, Decreased caregiver support    Equipment recommendations: shower chair     GOALS:   Multidisciplinary Problems     Physical Therapy Goals        Problem: Physical Therapy Goal    Goal Priority Disciplines Outcome Goal Variances  Interventions   Physical Therapy Goal     PT, PT/OT Ongoing (interventions implemented as appropriate)     Description:  Goals to be met by: 9 days     Patient will increase functional independence with mobility by performin. Supine to sit with Modified [place] = met 10/8/2018  2. Sit to supine with Modified [place] = met 10/8/2018  3. Sit to stand transfer with Supervision using the least restrictive device. met  4. Bed to chair transfer with Supervision using the least restrictive device. met  5. Gait  x 80 feet with Supervision using the least restrictive device.met  6. Ascend/descend 8 steps with bilateral Handrails Stand-by Assistance. met  7. Ascend/Descend 4 inch curb step with Supervision using the least restrictive device.  8. Stand for 3 minutes with Stand-by Assistance using the least restrictive device.  9. Lower extremity exercise program x20 reps per handout, with assistance as needed  NEW GOAL 10/8/2018 :  10. Gait x 200 feet w/ Supervision w/ rollator = not met                         PLAN:    Patient to be seen 5 x/week  to address the above listed problems via gait training, therapeutic activities, therapeutic exercises  Plan of Care expires: 18  Plan of Care reviewed with: patient    Abbie Surya, PTA  10/12/2018

## 2018-10-12 NOTE — ASSESSMENT & PLAN NOTE
Cont Vit D supplement to treat     10/8/18  Continue vitamin d as ordered.    10/12/18  No acute issues.  Continue vitamin D as ordered.

## 2018-10-12 NOTE — PLAN OF CARE
Problem: Occupational Therapy Goal  Goal: Occupational Therapy Goal  Goals to be met by: 11 days     Patient will increase functional independence with ADLs by performing:    UE Dressing with Modified [place].-met  LE Dressing with Supervision.-met  Grooming while standing with A/D and Supervision.--met  Toileting from bedside commode with Modified [place] for hygiene and clothing management. --met on toilet with RW and grab bar  Bathing from  shower chair/bench with Supervision.--met  Supine to sit with Modified [place].--met  Stand pivot transfers with Modified [place] with RW.--met  Pt will tolerate up to 10 minutes of functional standing activity with A/D to steady and (S).--met  Pt's caregiver will be competent with assisting with self care tasks and functional mobility.--met      Outcome: Outcome(s) achieved Date Met: 10/12/18  Goals met. BRANDEE Berg  10/12/2018

## 2018-10-12 NOTE — PROGRESS NOTES
Adherence packed for 30 days using EZY DOS monthly packs:     Morning card:  Asprin 81 mg  Carvedlol 12.5 mg (carvedilol 25 mg cut in half)  Citalopram 20 mg (1&1/2 tabs)  Famotidine 20 mg  Hydroxychloroquine 200 mg  Isosorbide Mononitrate ER 60 mg  Levothyroxine 75 mcg  Nifedipine ER Osmotic 60 mg        Evening card:  Atorvastatin 80 mg  Carvedilol 12.5 mg (carvedilol 25 mg cut in half)  Doxazosin 2 mg  Hydroxychloroquine 200 mg  Nifedipine ER Osmotic 60 mg          Furosemide 40mg not packed-pt to take as needed  Pt prefers to get meds from [facility] and brings it to pharmacy.  Nifedipine ER fill at [facility] through [facility]   Irbesartan or hydralazine 100mg d/c    *Patient took back meds. Receipt for October 2018 transaction is in her tote.

## 2018-10-12 NOTE — ASSESSMENT & PLAN NOTE
Cont with current dose of levothyroxine to treat   Lab Results   Component Value Date    TSH 0.954 09/05/2018    Y7PDJTX 68 06/30/2017    H2RIBDB 4.4 (L) 06/30/2017    FREET4 0.96 09/05/2018     Current treatment plan effective  No change in therapy  Repeat TSH as outpatient with PCP    10/8/18  Continue Synthroid as ordered and follow up with PCP.    10/12/18  Continue home dosing of synthroid and follow up with PCP.

## 2018-10-12 NOTE — PT/OT/SLP PROGRESS
"Occupational Therapy  Treatment/Discharge    Ewelina Arnold   MRN: [id number]   Admitting Diagnosis: Debility    OT Date of Treatment: 10/12/18  Total Time (min): 10 min    Billable Minutes:  Therapeutic Activity 10    General Precautions: Standard, fall  Orthopedic Precautions: N/A  Braces: N/A    Do you have any cultural, spiritual, Sabianism conflicts, given your current situation?: None stated    Subjective:  Communicated with pt and daughter prior to session.  "I will figure out if I need a seat for the tub"    Pain/Comfort  Pain Rating 1: 0/10  Pain Rating Post-Intervention 1: 0/10    Objective:  Patient found with: (seated in bedside chair with daughter present)    Occupational Performance:      Functional Mobility/Transfers:  · Patient completed Sit <> Stand Transfer with modified independence  with  rolling walker   · Patient completed Toilet Transfer Stand Pivot technique with modified independence with  rolling walker  · Functional Mobility: pt ambulates in room mod I RW and bathroom    Activities of Daily Living:  · Toileting: modified independence on toilet    [facility] 6 Click:  [facility] Total Score: 23      Additional Treatment:  Pt family educated on self care skills, independence, use of DME, options for tub, home health , decreased functional endurance    Patient left up in chair with PT and daughter present    ASSESSMENT:  Ewelina Arnold is a 72 y.o. female with a medical diagnosis of Debility . Pt has met OT goals. Family training completed and is indep with pt's care. Pt will cont to benefit from home health to increase indep and safety in the home.    Rehab identified problem list/impairments: weakness, impaired endurance, impaired self care skills, impaired functional mobilty, impaired balance, decreased lower extremity function, decreased safety awareness, impaired cardiopulmonary response to activity    Rehab potential is good    Activity tolerance: Good    Discharge recommendations: home with home " health     Barriers to discharge: Inaccessible home environment, Decreased caregiver support     Equipment recommendations: none ordered;however, recommend tub seat    GOALS:   Multidisciplinary Problems     Occupational Therapy Goals     Not on file          Multidisciplinary Problems (Resolved)        Problem: Occupational Therapy Goal    Goal Priority Disciplines Outcome Interventions   Occupational Therapy Goal   (Resolved)     OT, PT/OT Outcome(s) achieved    Description:  Goals to be met by: 11 days     Patient will increase functional independence with ADLs by performing:    UE Dressing with Modified [place].-met  LE Dressing with Supervision.-met  Grooming while standing with A/D and Supervision.--met  Toileting from bedside commode with Modified [place] for hygiene and clothing management. --met on toilet with RW and grab bar  Bathing from  shower chair/bench with Supervision.--met  Supine to sit with Modified [place].--met  Stand pivot transfers with Modified [place] with RW.--met  Pt will tolerate up to 10 minutes of functional standing activity with A/D to steady and (S).--met  Pt's caregiver will be competent with assisting with self care tasks and functional mobility.--met                        Plan:   (D/C pt from inpt OT)   Plan of Care reviewed with: patient, daughter    Chidi Bishop BRANDEE  10/12/2018

## 2018-10-12 NOTE — ASSESSMENT & PLAN NOTE
Cont Protonix to treat    10/8/18  Chronic, continue Protonix as ordered.    10/12/18  Chronic, continue protonix and follow up with PCP.

## 2018-10-12 NOTE — ASSESSMENT & PLAN NOTE
Continue home hydroxychloroquine 200mg bid    10/8/18  Chronic, continue plaquenil as ordered and follow up with Rheumatology.    10/12/18  Chronic, recommend she follow up with Rheumatologist and continue her Plaquenil as previously prescribed.

## 2018-10-12 NOTE — ASSESSMENT & PLAN NOTE
Maintain euvolemia by monitoring I/O's and daily weights.  Fluid restriction (1.5 liters/24 hours)  Low Na diet  Monitor for signs of fluid overload: O2 sat<92%, weight gain of >3 lbs, or urinary output <160ml/8hr  Maintain oxygen sats >92% via NC if supplemental oxygen needed.   Current Bumex and Metolazone on hold due to hypotension. Cont to monitor and restart when appropriate. Patient is currently below her dry weight as listed below.   Cont coreg, Imdur to treat      Patient Vitals for the past 72 hrs (Last 3 readings):   Weight   10/11/18 0625 49.7 kg (109 lb 9.1 oz)   10/10/18 0637 49.4 kg (108 lb 14.5 oz)     10/8/18  Weight is stable.  BP mildly elevated.  No edema noted.  Per MD note, Metolazone and Bumex on hold due to hypotension (may need to reconsider restarting in next 24 hours if BP up)  Continue fluid restriction and monitor.    10/12/18  Patient will follow 1500 cc fluid restriction at discharge.  Will continue to hold bumex and metolazone due to risk of hypotension.  She will monitor daily weights and take Lasix for 2-3 lb increase in 24 hours or 4-6 lb increase over a week.  She will follow up with heart failure clinic.

## 2018-10-12 NOTE — ASSESSMENT & PLAN NOTE
Cont current medical management with Coreg, ASA, atorvastatin, Imdur to treat  No current complaints of CP or PEDRO    10/8/18  Continue chronic medical management with b-blocker, lipitor, ASA, and Imdur.  No CP and reports gets PEDRO at times (states better now)    10/12/18  No CP reported.  She will continue her b-blocker, lipitor, ASA, and Imdur as prescribed.

## 2018-10-12 NOTE — ASSESSMENT & PLAN NOTE
Cont home Latanoprost to treat.     10/8/18  No acute issues.  Continue Latanoprost and follow up with treating provider.    10/12/18  Chronic, continue latanoprost and follow up with treating provider.

## 2018-10-12 NOTE — ASSESSMENT & PLAN NOTE
Chronic and stable  Cont Citalopram to treat    10/8/18  Chronic, continue Celexa and follow up with treating provider.    10/12/18  No acute issues, continue celexa and follow up with treating provider.

## 2018-10-12 NOTE — NURSING
DISCHARGE INSTRUCTIONS GIVEN AND PATIENT UNDERSTANDS.DISCHARGED BY WHEELCHAIR ACCOMPANIED BY NURSE TO LOBBY AND ASSISTED INTO SUV TO HOME WITH DAUGHTER.

## 2018-10-12 NOTE — PLAN OF CARE
Problem: Physical Therapy Goal  Goal: Physical Therapy Goal  Goals to be met by: 9 days     Patient will increase functional independence with mobility by performin. Supine to sit with Modified [place] = met 10/8/2018  2. Sit to supine with Modified [place] = met 10/8/2018  3. Sit to stand transfer with Supervision using the least restrictive device. met  4. Bed to chair transfer with Supervision using the least restrictive device. met  5. Gait  x 80 feet with Supervision using the least restrictive device.met  6. Ascend/descend 8 steps with bilateral Handrails Stand-by Assistance. met  7. Ascend/Descend 4 inch curb step with Supervision using the least restrictive device.  8. Stand for 3 minutes with Stand-by Assistance using the least restrictive device.  9. Lower extremity exercise program x20 reps per handout, with assistance as needed  NEW GOAL 10/8/2018 :  10. Gait x 200 feet w/ Supervision w/ rollator = not met        Goals appropriate. Continue with PT POC as indicated.

## 2018-10-12 NOTE — DISCHARGE SUMMARY
[facility] - Skilled Nursing Care  Department of [facility]  Discharge Summary      Patient Name: Ewelina Arnold  MRN: [id number]  Admission Date: 10/4/2018  Hospital Length of Stay: 8 days  Discharge Date and Time:  10/12/2018 1:10 PM  Attending Physician: Rylie Jain MD   Discharging Provider: Mehul Blackwood NP  Primary Care Provider: Amarilys Johns MD    HPI:   Chief Complaint/Reason for Admission: Debility    History of Present Illness:  Patient is a 72 y.o. female who has a past medical history of chronic diastolic congestive heart failure, glaucoma, Anemia, Cataract, Chronic kidney disease, Chronic sciatica of left side, Coronary artery disease, Depression, GERD, History of colon cancer, Hyperaldosteronism, Hyperlipidemia, Hypertension, Hypothyroidism, systemic lupus erythematosus, Osteoarthritis of cervical spine,peripheral artery disease, and renal artery stenosis presented with hypoxemic respiratory failure from acute decompensated heart failure. Patient diureses with lasix drip and then subsequently transitioned to oral Bumex and metolazone. Patient became hypotensive and diuretics put on hold. Nephrology consulted for patients CKD and stated patient will likely need initiation of HD in near future. Patient has been working with PT/OT who recommend SNF for further balance/mobility training. Patient currently with no complaints. She denies any fluid overload or edema. She denies any chest pain or dyspnea. She lives home along and is eager to discharge home.    * No surgery found *      Hospital Course:   10/8/18  Patient seen at bedside, she is requesting to speak with charge nurse but would not elaborate why.  She is disgusted with the food being served.  She reports she gets SOB with activity at times but has improved since admitted to hospital.  She currently has no pain.  She reports she is voiding and having BM without issues.  Plans to discharge home, reports her kids check in on  her.    10/12/18  Patient seen prior to discharge, she has no complaints of CP.  She occasionally gets PEDRO but this has improved since admission.  She is aware she needs to follow up with Nephrology, Heart Transplant, PCP, and also is going to see palliative care after discharge.  She will be referred to outpatient case management as well.  She was told to monitor her daily weights and blood pressure and to take her lasix as indicated for weight gain.  She actively participated in her therapy sessions and therapy has recommended home with home health.       Consults (From admission, onward)        Status Ordering Provider     Inpatient consult to Registered Dietitian/Nutritionist  Once     Provider:  (Not yet assigned)    Completed ELZA MOONEY     Inpatient consult to Registered Dietitian/Nutritionist  Once     Provider:  (Not yet assigned)    Completed ЕКАТЕРИНА AQUINO     Inpatient consult to [facility]  Once     Provider:  (Not yet assigned)    Completed ELZA MOONEY          Significant Diagnostic Studies: Labs:   BMP:   Recent Labs   Lab  10/11/18   0457   GLU  65*   NA  141   K  3.9   CL  106   CO2  26   BUN  78*   CREATININE  3.0*   CALCIUM  8.4*   MG  1.6    and CBC   Recent Labs   Lab  10/11/18   0457   WBC  5.36   HGB  8.0*   HCT  26.9*   PLT  355*       Pending Diagnostic Studies:     None        Final Active Diagnoses:    Diagnosis Date Noted POA    PRINCIPAL PROBLEM:  Debility [R53.81] 10/05/2018 Yes    Vitamin D deficiency [E55.9] 09/29/2018 Yes    Metabolic acidosis, normal anion gap (NAG) [E87.2] 04/03/2018 Yes    Anemia in stage 4 chronic kidney disease [N18.4, D63.1] 07/25/2017 Yes    Major depressive disorder with single episode, in full remission [F32.5] 05/16/2017 Yes    Malnutrition of moderate degree [E44.0] 05/16/2017 Yes    CKD (chronic kidney disease), stage IV [N18.4] 01/23/2017 Yes    Left ventricular diastolic dysfunction with preserved systolic function [I51.9]  Yes    GERD  (gastroesophageal reflux disease) [K21.9]  Yes    Dyslipidemia [E78.5]  Yes    Acquired hypothyroidism [E03.9] 05/13/2015 Yes    Edema [R60.9] 12/12/2012 Yes    Residual stage angle-closure glaucoma - Both Eyes [H40.249] 11/27/2012 Yes    Coronary artery disease due to calcified coronary lesion [I25.10, I25.84] 10/05/2012 Yes    Resistant hypertension [I10] 10/05/2012 Yes    Other forms of systemic lupus erythematosus [M32.8] 08/17/2012 Yes     Chronic      Problems Resolved During this Admission:      * Debility    Cont with PT/OT for gait training and strengthening and restoration of ADL's   Encourage mobility, OOB in chair, and early ambulation as appropriate  Fall precautions   Monitor for bowel and bladder dysfunction  Monitor for and prevent skin breakdown and pressure ulcers  Cont DVT prophylaxis with heparin   Anticoagulants   Medication Route Frequency    heparin (porcine) injection 5,000 Units Subcutaneous Q8H     10/8/18  Continue therapy to improve functional strength.  Heparin for dvt ppx as ordered.  Plans to discharge home with family help.    10/12/18  Progressed with therapy.  Therapy recommends home with home health.        Vitamin D deficiency    Cont Vit D supplement to treat     10/8/18  Continue vitamin d as ordered.    10/12/18  No acute issues.  Continue vitamin D as ordered.        Metabolic acidosis, normal anion gap (NAG)    Cont Sodium bicarb to treat    10/8/18  CO2 at 30 will reduce sodium bicarb to bid dosing and monitor.    10/12/18  As above, bicarb has improved now at 26.  Sodium bicarb reduced to bid dosing prior to discharge.        Anemia in stage 4 chronic kidney disease    Lab Results   Component Value Date    HGB 8.0 (L) 10/11/2018     H/H stable  Cont to monitor.     10/8/18  Hgb is stable, no reported symptoms.  Continue to monitor and treat for symptomatic anemia or hgb < 7    10/12/18  Hgb is stable.  She has no symptoms of anemia.  Recommend she follow up with  Nephrology after discharge.        Malnutrition of moderate degree    Dietary consult     10/8/18  Dietary consulted.  Patient does not like food being served.  She is lactose intolerant, spoke with dietary regarding obtaining supplement that is lactose free.     10/12/18  Patient is lactose intolerant.  Weights were monitored.  Dietary was consulted.  Patient is eating ~75% of meals served.          Major depressive disorder with single episode, in full remission    Chronic and stable  Cont Citalopram to treat    10/8/18  Chronic, continue Celexa and follow up with treating provider.    10/12/18  No acute issues, continue celexa and follow up with treating provider.        CKD (chronic kidney disease), stage IV    Lab Results   Component Value Date    CREATININE 3.0 (H) 10/11/2018     Sr Cr stable but BUN slightly trending upwards  Cont to monitor with biweekly labs   Avoid nephrotoxins.   Renally dose all medications   Monitor urine output   Cont sodium bicarb to treat  Patient will need follow up with Nephrology while at [facility]    10/8/18  BUN/Cr is improving, continue to monitor.  CO2 up to 30 and therefore will reduce sodium bicarb to bid dosing.  Bumex and Zaroxolyn currently on hold, weight is stable.  Follow up with Nephrology as stated above,  to assist with arranging appointment.  Future Appointments   Date Time Provider Department Center   10/26/2018  9:00 AM Curt Epperson MD [facility] NEPHRO [facility]   10/30/2018 11:00 AM Amarilys Johns MD [facility] MED CLN [facility] PCW   11/6/2018  9:00 AM Yeimi Burdick MD [facility] HEARTTX [facility]     10/12/18  Renal function slowly improving but remains elevated.  Patient only with 1 kidney.  She will follow up with nephrology and Heart failure team after discharge.  Bicarb level has improved therefore will reduce sodium bicarb to bid dosing.        Dyslipidemia    Cont atorvastatin to treat    10/8/18  Chronic, continue Lipitor as ordered    10/12/18  Chronic,  continue Lipitor and follow up with PCP.        GERD (gastroesophageal reflux disease)    Cont Protonix to treat    10/8/18  Chronic, continue Protonix as ordered.    10/12/18  Chronic, continue protonix and follow up with PCP.        Left ventricular diastolic dysfunction with preserved systolic function    Maintain euvolemia by monitoring I/O's and daily weights.  Fluid restriction (1.5 liters/24 hours)  Low Na diet  Monitor for signs of fluid overload: O2 sat<92%, weight gain of >3 lbs, or urinary output <160ml/8hr  Maintain oxygen sats >92% via NC if supplemental oxygen needed.   Current Bumex and Metolazone on hold due to hypotension. Cont to monitor and restart when appropriate. Patient is currently below her dry weight as listed below.   Cont coreg, Imdur to treat      Patient Vitals for the past 72 hrs (Last 3 readings):   Weight   10/11/18 0625 49.7 kg (109 lb 9.1 oz)   10/10/18 0637 49.4 kg (108 lb 14.5 oz)     10/8/18  Weight is stable.  BP mildly elevated.  No edema noted.  Per MD note, Metolazone and Bumex on hold due to hypotension (may need to reconsider restarting in next 24 hours if BP up)  Continue fluid restriction and monitor.    10/12/18  Patient will follow 1500 cc fluid restriction at discharge.  Will continue to hold bumex and metolazone due to risk of hypotension.  She will monitor daily weights and take Lasix for 2-3 lb increase in 24 hours or 4-6 lb increase over a week.  She will follow up with heart failure clinic.        Acquired hypothyroidism    Cont with current dose of levothyroxine to treat   Lab Results   Component Value Date    TSH 0.954 09/05/2018    X6ZLPBX 68 06/30/2017    I2KBBEL 4.4 (L) 06/30/2017    FREET4 0.96 09/05/2018     Current treatment plan effective  No change in therapy  Repeat TSH as outpatient with PCP    10/8/18  Continue Synthroid as ordered and follow up with PCP.    10/12/18  Continue home dosing of synthroid and follow up with PCP.        Edema    Low Na  diet  NADER hose  Leg elevation   Lasix PRN for now     10/8/18  Mild edema to left lower leg.  Continue to encourage use of compression socks (currently wearing) and limb elevation.  Patient on fluid restriction.  Lasix PRN ordered.  BUN elevated but improving and Creatinine remains elevated but chronic.    10/12/18  No edema noted today.  Still reports occasional PEDRO with therapy.  Weight up 2 kg since admit.  Will give Lasix 20 mg x 1 dose prior to discharge.        Residual stage angle-closure glaucoma - Both Eyes    Cont home Latanoprost to treat.     10/8/18  No acute issues.  Continue Latanoprost and follow up with treating provider.    10/12/18  Chronic, continue latanoprost and follow up with treating provider.        Resistant hypertension    BP Readings from Last 3 Encounters:   10/12/18 (!) 158/73   10/04/18 (!) 160/72   09/28/18 123/63     BP ok   Cont current Coreg, Nifedipine, doxazosin, imdur to treat  Cardiac diet    10/8/18  BP elevated this AM but patient very upset and requesting to speak with charge nurse.  Will continue Coreg, Nifedipine, Doxazosin and Imdur and monitor.  If BP continues to be high will adjust coreg dosing.    10/12/18  BP waxes and waynes.  She is currently on Coreg, Nifedipine, Doxazosin and Imdur.  Doxazosin dosing was increased during this admission.  Recommend patient follow her BP and follow up with heart transplant/heart failure clinic.        Coronary artery disease due to calcified coronary lesion    Cont current medical management with Coreg, ASA, atorvastatin, Imdur to treat  No current complaints of CP or PEDRO    10/8/18  Continue chronic medical management with b-blocker, lipitor, ASA, and Imdur.  No CP and reports gets PEDRO at times (states better now)    10/12/18  No CP reported.  She will continue her b-blocker, lipitor, ASA, and Imdur as prescribed.        Other forms of systemic lupus erythematosus    Continue home hydroxychloroquine 200mg bid    10/8/18  Chronic,  continue plaquenil as ordered and follow up with Rheumatology.    10/12/18  Chronic, recommend she follow up with Rheumatologist and continue her Plaquenil as previously prescribed.            Discharged Condition: stable    Disposition: Home-Health Care [facility]    Follow Up:  Follow-up Information     [facility].    Specialties:  Home Health Services, Physical Therapy, Occupational Therapy  Why:  Agency will call pt to schedule a home health assessment.  Contact information:  [address] [place] [address]  [phone]                 Patient Instructions:      Ambulatory referral to Outpatient Case Management   Referral Priority: Routine Referral Type: Consultation   Referral Reason: Specialty Services Required   Number of Visits Requested: 1     Notify your health care provider if you experience any of the following:  increased confusion or weakness     Notify your health care provider if you experience any of the following:  persistent dizziness, light-headedness, or visual disturbances     Notify your health care provider if you experience any of the following:  worsening rash     Notify your health care provider if you experience any of the following:  severe persistent headache     Notify your health care provider if you experience any of the following:  difficulty breathing or increased cough     Notify your health care provider if you experience any of the following:  severe uncontrolled pain     Notify your health care provider if you experience any of the following:  persistent nausea and vomiting or diarrhea     Notify your health care provider if you experience any of the following:  temperature >100.4     Activity as tolerated     Medications:  Reconciled Home Medications:      Medication List      START taking these medications    atorvastatin 80 MG tablet  Commonly known as:  LIPITOR  Take 1 tablet (80 mg total) by mouth once daily.     senna-docusate 8.6-50 mg  8.6-50 mg per tablet  Commonly known as:  PERICOLACE  Take 1 tablet by mouth 2 (two) times daily.        CHANGE how you take these medications    carvedilol 6.25 MG tablet  Commonly known as:  COREG  Take 1 tablet (6.25 mg total) by mouth 2 (two) times daily.  What changed:  Another medication with the same name was removed. Continue taking this medication, and follow the directions you see here.     doxazosin 2 MG tablet  Commonly known as:  CARDURA  Take 1 tablet (2 mg total) by mouth every evening.  What changed:    · medication strength  · how much to take     furosemide 40 MG tablet  Commonly known as:  LASIX  Take 1 tablet (40 mg total) by mouth daily as needed (for weight gain, shortness of breath).  What changed:  Another medication with the same name was removed. Continue taking this medication, and follow the directions you see here.     sodium bicarbonate 650 MG tablet  Take 1 tablet (650 mg total) by mouth 2 (two) times daily.  What changed:  when to take this        CONTINUE taking these medications    aspirin 81 MG EC tablet  Commonly known as:  ECOTRIN  Take 1 tablet (81 mg total) by mouth once daily.     calcipotriene 0.005 % sclp soln  Apply 1 application topically 2 (two) times daily.     citalopram 20 MG tablet  Commonly known as:  CELEXA  TAKE 1 AND 1/2  TABLETS (30 MG TOTAL) ONCE DAILY.     clobetasol 0.05 % external solution  Commonly known as:  TEMOVATE  Apply topically 2 (two) times daily.     ergocalciferol 50,000 unit Cap  Commonly known as:  VITAMIN D2  Take 1 capsule (50,000 Units total) by mouth every 7 days.     famotidine 20 MG tablet  Commonly known as:  PEPCID  Take 1 tablet (20 mg total) by mouth once daily.     hydroxychloroquine 200 mg tablet  Commonly known as:  PLAQUENIL  TAKE 1 TABLET TWICE DAILY     isosorbide mononitrate 60 MG 24 hr tablet  Commonly known as:  IMDUR  Take 1 tablet (60 mg total) by mouth once daily.     latanoprost 0.005 % ophthalmic solution  INSTILL 1 DROP  INTO BOTH EYES EVERY DAY     levothyroxine 75 MCG tablet  Commonly known as:  SYNTHROID  Take 1 tablet (75 mcg total) by mouth before breakfast.     NIFEdipine 60 MG (OSM) 24 hr tablet  Commonly known as:  PROCARDIA-XL  Take 1 tablet (60 mg total) by mouth 2 (two) times daily.        STOP taking these medications    butalbital-aspirin-caffeine -40 mg -40 mg Cap  Commonly known as:  FIORINAL     dicyclomine 10 MG capsule  Commonly known as:  BENTYL     metOLazone 2.5 MG tablet  Commonly known as:  ZAROXOLYN     mometasone 0.1 % ointment  Commonly known as:  ELOCON     rosuvastatin 10 MG tablet  Commonly known as:  CRESTOR          Time spent on the discharge of patient: 35 minutes    Mehul Blackwood NP  Department of Hospital Medicine  [facility] - Skilled Nursing Care

## 2018-10-12 NOTE — ASSESSMENT & PLAN NOTE
Lab Results   Component Value Date    CREATININE 3.0 (H) 10/11/2018     Sr Cr stable but BUN slightly trending upwards  Cont to monitor with biweekly labs   Avoid nephrotoxins.   Renally dose all medications   Monitor urine output   Cont sodium bicarb to treat  Patient will need follow up with Nephrology while at [facility]    10/8/18  BUN/Cr is improving, continue to monitor.  CO2 up to 30 and therefore will reduce sodium bicarb to bid dosing.  Bumex and Zaroxolyn currently on hold, weight is stable.  Follow up with Nephrology as stated above,  to assist with arranging appointment.  Future Appointments   Date Time Provider Department Center   10/26/2018  9:00 AM Curt Epperson MD [facility] NEPHRO [facility]   10/30/2018 11:00 AM Amarilys Johns MD [facility] MED CLN [facility] PCW   11/6/2018  9:00 AM Yeimi Burdick MD [facility] HEARTTX [facility]     10/12/18  Renal function slowly improving but remains elevated.  Patient only with 1 kidney.  She will follow up with nephrology and Heart failure team after discharge.  Bicarb level has improved therefore will reduce sodium bicarb to bid dosing.

## 2018-10-12 NOTE — ASSESSMENT & PLAN NOTE
Cont atorvastatin to treat    10/8/18  Chronic, continue Lipitor as ordered    10/12/18  Chronic, continue Lipitor and follow up with PCP.

## 2018-10-12 NOTE — ASSESSMENT & PLAN NOTE
Lab Results   Component Value Date    HGB 8.0 (L) 10/11/2018     H/H stable  Cont to monitor.     10/8/18  Hgb is stable, no reported symptoms.  Continue to monitor and treat for symptomatic anemia or hgb < 7    10/12/18  Hgb is stable.  She has no symptoms of anemia.  Recommend she follow up with Nephrology after discharge.

## 2018-10-12 NOTE — PLAN OF CARE
Problem: Fall Risk (Adult)  Goal: Absence of Falls  Patient will demonstrate the desired outcomes by discharge/transition of care.  Outcome: Ongoing (interventions implemented as appropriate)  Pt remained free from falls, injury and trauma throughout shift. Pt AAO x 4. Bed in lowest position and wheels locked. Pt instructed to call for assistance. Hourly rounds to monitor pt for safety and comfort. Personal items and call bell within reach. Pt denies pain. No signs or symptoms of distress.  Will continue to monitor pt.

## 2018-10-12 NOTE — ASSESSMENT & PLAN NOTE
Low Na diet  NADER hose  Leg elevation   Lasix PRN for now     10/8/18  Mild edema to left lower leg.  Continue to encourage use of compression socks (currently wearing) and limb elevation.  Patient on fluid restriction.  Lasix PRN ordered.  BUN elevated but improving and Creatinine remains elevated but chronic.    10/12/18  No edema noted today.  Still reports occasional PEDRO with therapy.  Weight up 2 kg since admit.  Will give Lasix 20 mg x 1 dose prior to discharge.

## 2018-10-12 NOTE — ASSESSMENT & PLAN NOTE
Cont Sodium bicarb to treat    10/8/18  CO2 at 30 will reduce sodium bicarb to bid dosing and monitor.    10/12/18  As above, bicarb has improved now at 26.  Sodium bicarb reduced to bid dosing prior to discharge.

## 2018-10-12 NOTE — ASSESSMENT & PLAN NOTE
BP Readings from Last 3 Encounters:   10/12/18 (!) 158/73   10/04/18 (!) 160/72   09/28/18 123/63     BP ok   Cont current Coreg, Nifedipine, doxazosin, imdur to treat  Cardiac diet    10/8/18  BP elevated this AM but patient very upset and requesting to speak with charge nurse.  Will continue Coreg, Nifedipine, Doxazosin and Imdur and monitor.  If BP continues to be high will adjust coreg dosing.    10/12/18  BP waxes and waynes.  She is currently on Coreg, Nifedipine, Doxazosin and Imdur.  Doxazosin dosing was increased during this admission.  Recommend patient follow her BP and follow up with heart transplant/heart failure clinic.

## 2018-10-15 ENCOUNTER — TELEPHONE (OUTPATIENT)
Dept: INTERNAL MEDICINE | Facility: CLINIC | Age: 73
End: 2018-10-15

## 2018-10-15 ENCOUNTER — DOCUMENTATION ONLY (OUTPATIENT)
Dept: ADMINISTRATIVE | Facility: OTHER | Age: 73
End: 2018-10-15

## 2018-10-15 ENCOUNTER — PATIENT OUTREACH (OUTPATIENT)
Dept: ADMINISTRATIVE | Facility: CLINIC | Age: 73
End: 2018-10-15

## 2018-10-15 ENCOUNTER — TELEPHONE (OUTPATIENT)
Dept: ADMINISTRATIVE | Facility: OTHER | Age: 73
End: 2018-10-15

## 2018-10-15 NOTE — TELEPHONE ENCOUNTER
Patient called asking to speak with Nimo Mercado RN her . She was told she should be in the digital program that monitors her weight but didn't know how to get enrolled. She was told to contact Outpatient Case mgmt.     A message will be forwarded to Nimo Mercado RN to assist the patient.    Sapna Johnson, [facility]  Outpatient Care [facility].  [phone]

## 2018-10-15 NOTE — TELEPHONE ENCOUNTER
Please get her in for hospital discharge appointment tomorrow morning.    Thanks,  KJ  ----- Message from Albertina Scott MA sent at 10/15/2018  1:00 PM CDT -----  Contact: self [phone]  Pt wants HH sent to home Please, advise   ----- Message -----  From: Donna Kline  Sent: 10/15/2018  12:58 PM  To: [facility] Staff    Patient was just d/c Friday from skilled unit and would like to come in for a follow up, and to see if home health can be ordered for her, Please call her back and advise    Thank you

## 2018-10-15 NOTE — PROGRESS NOTES
Patient is already enrolled into [facility] and followed by Nimo Mercado RN.    New info will be forwarded to RN ELIAS.    Referral dx:   Debility  Chronic diastolic congestive heart failure    Thanks,  Christy Hardy, [facility] S[facility]

## 2018-10-16 ENCOUNTER — TELEPHONE (OUTPATIENT)
Dept: INTERNAL MEDICINE | Facility: CLINIC | Age: 73
End: 2018-10-16

## 2018-10-16 NOTE — TELEPHONE ENCOUNTER
This is a great idea, I don't know if CHF digital medicine program is still running. The referral must be placed before discharge, and this may not have been done.    Albertina- can you talk to [facility] and see if they know of any organized program for heart failure.    Thanks,  KJ      ----- Message from Albertina Scott MA sent at 10/16/2018  1:54 PM CDT -----      ----- Message -----  From: Nimo Mercado RN  Sent: 10/16/2018   1:49 PM  To: Holley Webster, PharmD, #    Ms eduardo was inquiring about the chf digital program.  She states she was previously considered but there were no scales available and she would like to be reconsidered.      Thanks  Nimo Mercado RN,[facility]  Outpatient Complex Case Management  [phone]

## 2018-10-17 ENCOUNTER — TELEPHONE (OUTPATIENT)
Dept: INTERNAL MEDICINE | Facility: CLINIC | Age: 73
End: 2018-10-17

## 2018-10-17 ENCOUNTER — OFFICE VISIT (OUTPATIENT)
Dept: PRIMARY CARE CLINIC | Facility: CLINIC | Age: 73
End: 2018-10-17
Payer: MEDICARE

## 2018-10-17 ENCOUNTER — OUTPATIENT CASE MANAGEMENT (OUTPATIENT)
Dept: ADMINISTRATIVE | Facility: OTHER | Age: 73
End: 2018-10-17

## 2018-10-17 VITALS
DIASTOLIC BLOOD PRESSURE: 56 MMHG | HEIGHT: 64 IN | WEIGHT: 111.31 LBS | OXYGEN SATURATION: 99 % | HEART RATE: 53 BPM | BODY MASS INDEX: 19 KG/M2 | SYSTOLIC BLOOD PRESSURE: 110 MMHG

## 2018-10-17 DIAGNOSIS — I51.89 LEFT VENTRICULAR DIASTOLIC DYSFUNCTION WITH PRESERVED SYSTOLIC FUNCTION: ICD-10-CM

## 2018-10-17 DIAGNOSIS — I50.33 ACUTE ON CHRONIC DIASTOLIC CONGESTIVE HEART FAILURE: ICD-10-CM

## 2018-10-17 DIAGNOSIS — N18.4 ANEMIA OF CHRONIC RENAL FAILURE, STAGE 4 (SEVERE): ICD-10-CM

## 2018-10-17 DIAGNOSIS — I13.0 CARDIORENAL SYNDROME, STAGE 1-4 OR UNSPECIFIED CHRONIC KIDNEY DISEASE, WITH HEART FAILURE: ICD-10-CM

## 2018-10-17 DIAGNOSIS — D63.1 ANEMIA OF CHRONIC RENAL FAILURE, STAGE 4 (SEVERE): ICD-10-CM

## 2018-10-17 PROCEDURE — 99499 UNLISTED E&M SERVICE: CPT | Mod: HCNC,S$GLB,, | Performed by: INTERNAL MEDICINE

## 2018-10-17 PROCEDURE — 99214 OFFICE O/P EST MOD 30 MIN: CPT | Mod: S$GLB,,, | Performed by: INTERNAL MEDICINE

## 2018-10-17 NOTE — TELEPHONE ENCOUNTER
Dr Treviño- could you do some reseach on the CHF digital program? Patient needs remote weight monitoring and real-time changes in diuretic program. The digital CHF program has to be ordered before hospital discharge, but I'm not sure this program is still working.    Thanks,  WENDY Scott MA   You 16 hours ago (3:41 PM)      Spoke with [facility] he [facility] but was having some issues.  (Routing comment)       You routed conversation to [facility] Staff 18 hours ago (2:01 PM)      You 18 hours ago (1:58 PM)         This is a great idea, I don't know if CHF digital medicine program is still running. The referral must be placed before discharge, and this may not have been done.     Albertina- can you talk to [facility] and see if they know of any organized program for heart failure.     Thanks,  WENDY        ----- Message from Albertina Scott MA sent at 10/16/2018  1:54 PM CDT -----        ----- Message -----  From: Nimo Mercado RN  Sent: 10/16/2018   1:49 PM  To: Holley Webster, PharmD, #     Ms eduardo was inquiring about the chf digital program.  She states she was previously considered but there were no scales available and she would like to be reconsidered.       Thanks  Nimo Mercado RN,[facility]  Outpatient Complex Case Management  [phone]

## 2018-10-17 NOTE — PROGRESS NOTES
"Primary Care Provider Appointment- HOSPITAL DISCHARGE    Subjective:      Patient ID: Ewelina Arnold is a 72 y.o. female with CHF, CAD, SLE, CKD    Chief Complaint: Hospital Follow Up    Patient with recent hospital admit for CHF, discharged from rehab on 10/12/18. "Volume retention is likely a component of worsening CKD and / or cardiorenal and/or recent prednisone use and /or recent lasix cessation."     She was diuresed with improvement. Her dry weight is 107lbs. She was diuresed with bumex and metolazone during admit, then discharged on lasix only.    Her cardiac meds changed as follows:  - decreased coreg 25mg BID to coreg 6.25 bid given HR of 50s  - cont home doxazosin   - cont home isosorbide mononitrate 60mh  - cont home nifedipine 60mg BID  - BP's controlled on above regimen    Her CKD is worsening, per discharge summary "likely cardiorenal vs progression of CKD." She needs continued eval in Nephro clinic for dialysis. Her creatinine improved after aggressive diuresis.    She has aortic stenosis, but continued feedback from interventional cardiology is that she does not need AVR.     She has not taken lasix since rehab discharge on 10/12/18. Her weight today is 119#, dry weight is 107#.    Pill packs need to be reconciled with hospital discharge changes.    Past Surgical History:   Procedure Laterality Date    BIOPSY-BONE MARROW Left 10/16/2017    Performed by Grant Santillan MD at [facility] OR [address] [place] [address]    CARDIAC CATHETERIZATION  07/27/2011    x1    CHOLECYSTECTOMY  1999    COLON SURGERY  2000 & 2003    partial removal    COLONOSCOPY N/A 4/14/2016    Procedure: COLONOSCOPY;  Surgeon: Jose Steen MD;  Location: [facility] ([address] [place] [address]);  Service: Endoscopy;  Laterality: N/A;  prep 2 days prior light meals only/clear liquid day before  and 4 dulcolax tabs (5 mgs) at noon    COLONOSCOPY N/A 9/14/2017    Procedure: COLONOSCOPY;  Surgeon: Jose Steen MD;  Location: [facility] ([address] [place] [address]);  Service: Endoscopy;  " Laterality: N/A;    COLONOSCOPY N/A 9/14/2017    Performed by Jose [facility]en MD at [facility] ENDO (4TH FLR)    COLONOSCOPY N/A 4/14/2016    Performed by Jose Steen MD at [facility] ENDO (4TH FLR)    COLONOSCOPY N/A 4/23/2013    Performed by Jose Steen MD at [facility] ENDO (4TH FLR)    TALIA-TRANSFORAMINAL Left 11/3/2016    Performed by Brett Johnson [facility] at [facility]    ESOPHAGOGASTRODUODENOSCOPY (EGD) N/A 3/16/2017    Performed by Yogesh Fernandes MD at [facility] ENDO    EYE SURGERY      HAND SURGERY Bilateral 1996 & 1997    due to carpal tunnel     HERNIA REPAIR      HYSTERECTOMY  1983    INJECTION-STEROID-EPIDURAL-TRANSFORAMINAL Left 1/7/2016    Performed by Brett Johnson MD at [facility]    Left heart cath Left 12/20/2017    Performed by Chandan Ly MD at [facility] CATH LAB    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    OOPHORECTOMY      removed one    Peripheral Iridotomy both eyes  1994    laser angle correction    THYROIDECTOMY, PARTIAL  2006    to remove two nodules and one-half thyroid       Past Medical History:   Diagnosis Date    Acute hypoxemic respiratory failure 4/28/2018    Acute on chronic diastolic congestive heart failure 11/17/2017    Allergy     Anatomical narrow angle glaucoma     Anemia     States diagnosed about a month ago    Aortic atherosclerosis     Arthritis     Cataract     CHF (congestive heart failure)     Chronic kidney disease     Chronic sciatica of left side     Right lower back pain due to sciatica    Coronary artery disease     Depression     Dry eyes     GERD (gastroesophageal reflux disease)     History of colon cancer     Hyperaldosteronism     Hyperlipidemia     Hypertension     Hypothyroidism     Keloid cicatrix     Left ventricular diastolic dysfunction with preserved systolic function     Lupus (systemic lupus erythematosus) 8/17/2012    Malnutrition 5/16/2017    Osteoarthritis of cervical spine 8/17/2012    Osteopenia  "    PAD (peripheral artery disease)     FRAN (renal artery stenosis)        Review of Systems   Constitutional: Positive for activity change. Negative for appetite change.   Respiratory: Positive for shortness of breath. Negative for cough.    Cardiovascular: Negative for leg swelling.   Genitourinary: Negative for difficulty urinating and dysuria.   Skin: Positive for wound.   Neurological: Positive for dizziness. Negative for light-headedness.   Hematological: Bruises/bleeds easily.   Psychiatric/Behavioral: Negative for agitation, behavioral problems, confusion, decreased concentration and dysphoric mood.       Objective:   BP (!) 110/56 (BP Location: Right arm, Patient Position: Sitting, BP Method: Medium (Manual))   Pulse (!) 53   Ht 5' 4" (1.626 m)   Wt 50.5 kg (111 lb 5.3 oz)   LMP  (LMP Unknown) Comment: pt had taken bp meds less than an hour ago  SpO2 99%   BMI 19.11 kg/m²     Physical Exam   Constitutional: She is oriented to person, place, and time. She appears well-developed and well-nourished.   HENT:   Head: Normocephalic and atraumatic.   Neck: Normal range of motion.   Pulmonary/Chest: Effort normal.   Decreased breath sounds   Musculoskeletal: She exhibits deformity. She exhibits no edema.   Resolution of LLE nodule   Neurological: She is alert and oriented to person, place, and time.   Psychiatric: She has a normal mood and affect. Her behavior is normal. Thought content normal.   Vitals reviewed.      Lab Results   Component Value Date    WBC 3.07 (L) 10/17/2018    HGB 8.0 (L) 10/17/2018    HCT 25.9 (L) 10/17/2018     10/17/2018    CHOL 181 08/28/2018    TRIG 54 08/28/2018    HDL 66 08/28/2018    ALT 11 09/25/2018    AST 26 09/25/2018     10/11/2018    K 3.9 10/11/2018     10/11/2018    CREATININE 3.0 (H) 10/11/2018    BUN 78 (H) 10/11/2018    CO2 26 10/11/2018    TSH 0.954 09/05/2018    INR 1.0 09/05/2018    GLUF 79 12/02/2011    HGBA1C 5.2 09/25/2018 "                 Assessment:   72 y.o. female with multiple co-morbid illnesses here to continue work-up of chronic issues notably CHF, CAD, SLE, CKD.     Plan:     Problem List Items Addressed This Visit        Cardiac/Vascular    Left ventricular diastolic dysfunction with preserved systolic function     Normal EF, grade 2 diastolic HF, aortic stenosis on recent echo  · F/U heart failure clinic- Dr Burdick  · interventional cardiology not recommending AVR  · Digital scale purchase today  · Labs today  · Continue diuretics         Relevant Orders    Ambulatory Referral to Palliative Care    Acute on chronic diastolic congestive heart failure     Grade 2 diastolic failure, systolic HF, aortic stenosis  · Cotninue lasix to 40mg BID for now  - on hospital discharge take Lasix 40 mg daily for weight gain 2-3 lbs in one day, or 4-6 lbs in a week  · Patient takes PRN with weight gain  · F/U heart failure clinic         Relevant Orders    CBC auto differential (Completed)    Basic metabolic panel    Magnesium    Ambulatory Referral to Palliative Care    Cardiorenal syndrome, stage 1-4 or unspecified chronic kidney disease, with heart failure     Patient with worsening kidney function in the setting of decompensated CHF  · Continue frequent diuresis  · F/U Nephrology for progression to dialysis (if warranted)         Relevant Orders    Basic metabolic panel    Ambulatory Referral to Palliative Care       Oncology    Anemia of chronic renal failure, stage 4 (severe)     Followed by Nephrology, PRN iron infusions  · F/U Nephrology  · Iron infusion PRN  · May need erythropoetin         Relevant Orders    CBC auto differential (Completed)    Ambulatory Referral to Palliative Care          Health Maintenance       Date Due Completion Date    DEXA SCAN 01/05/2019 1/5/2016    High Dose Statin 10/12/2019 10/12/2018    Mammogram 09/11/2020 9/11/2018    Colonoscopy 09/14/2022 9/14/2017    Lipid Panel 08/28/2023 8/28/2018    TETANUS  VACCINE 06/29/2026 6/29/2016          Follow-up in about 1 week (around 10/24/2018). . One hour spent with this patient today, half of that in counseling.    Amarilys Johns MD/MPH  Internal Medicine  [facility]  [phone]

## 2018-10-17 NOTE — ASSESSMENT & PLAN NOTE
Grade 2 diastolic failure, systolic HF, aortic stenosis  · Cotninue lasix to 40mg BID for now  - on hospital discharge take Lasix 40 mg daily for weight gain 2-3 lbs in one day, or 4-6 lbs in a week  · Patient takes PRN with weight gain  · F/U heart failure clinic

## 2018-10-17 NOTE — TELEPHONE ENCOUNTER
Albertina- Please let patient know that Dr Treviño can come to her home tomorrow morning after 9am to  pill packs, review meds in detail, then bring them to the pharmacy to have them repacked. He will deliver to her home at the end of the day tomorrow. She will need to take all of her AM meds (including lasix) before she gives him her pill pack.    Dr Treviño- She will need the following changes,and also review of all of her meds. She should be taking lasix 40mg once to twice daily for her CHF. She bought a wi:    - coreg decreased to 6.25mg BID  - addition of bicarb BID  - removal of cardura in order for her to take as needed for HTN (make sure she understands how to do this, if she doesn't then we can keep it in pill pack and closely monitor BP)  - addition of vitamin D    WENDY Virgen

## 2018-10-17 NOTE — ASSESSMENT & PLAN NOTE
Patient with worsening kidney function in the setting of decompensated CHF  · Continue frequent diuresis  · F/U Nephrology for progression to dialysis (if warranted)

## 2018-10-17 NOTE — ASSESSMENT & PLAN NOTE
Normal EF, grade 2 diastolic HF, aortic stenosis on recent echo  · F/U heart failure clinic- Dr Burdick  · interventional cardiology not recommending AVR  · Digital scale purchase today  · Labs today  · Continue diuretics

## 2018-10-17 NOTE — PATIENT INSTRUCTIONS
TODAY:  - restart lasix 40mg twice daily  - PCP to discuss pill packs with pharmacy  - PCP to explore digital CHF program  - PCP to call home health to get weights from them  - [facility] Nurse practitioner to visit you in the home  - your dry weight is 107 pounds   + if weight is below that, you can hold the lasix  - take Lasix 40 mg twice daily for weight gain 2-3 lbs in one day, or 4-6 lbs in a week  - limit water intake to 500cc of free water   + will also get water from other food sources  - labs today

## 2018-10-17 NOTE — ASSESSMENT & PLAN NOTE
Followed by Nephrology, PRN iron infusions  · F/U Nephrology  · Iron infusion PRN  · May need erythropoetin

## 2018-10-18 ENCOUNTER — TELEPHONE (OUTPATIENT)
Dept: INTERNAL MEDICINE | Facility: CLINIC | Age: 73
End: 2018-10-18

## 2018-10-18 NOTE — PROGRESS NOTES
Adherence packed for 30 days using [employer] monthly packs:     Morning card:  Asprin 81 mg  Carvedlol 6.25 mg  Citalopram 20 mg (1&1/2 tabs)  Famotidine 20 mg  Hydroxychloroquine 200 mg  Isosorbide Mononitrate ER 60 mg  Levothyroxine 75 mcg  Nifedipine ER Osmotic 60 mg  Sodium Bicarb 650 mg        Evening card:  Atorvastatin 80 mg  Carvedilol 6.25 mg  Hydroxychloroquine 200 mg  Nifedipine ER Osmotic 60 mg  Sodium Bicarb 650 mg        Doxazosin 2 mg not packed, gave a bottle of 30 pills   Furosemide 40mg not packed-pt to take as needed  Pt prefers to get meds from [facility] and brings it to pharmacy.  Nifedipine ER fill at [facility] through [facility]   Irbesartan or hydralazine 100mg d/c     *Patient took back meds.

## 2018-10-18 NOTE — TELEPHONE ENCOUNTER
Please let patient know that her labs look great. Dr Doss should be at her house after 9am.    Thanks,  KJ

## 2018-10-18 NOTE — PROGRESS NOTES
Summary:  10/17/18 Message received from dr. chavira that she had pt  digital scale [facility] and is messaging someone trying to get a pharmacist to follow up with her on wt and diet.  She states he son will help set up the scale for her  10/16/18 Call received from pt and she states she would like to be in the digital chf program with the digital scale to help her with monitoring of wt etc.  Pt was recently discharged from skilled nursing after hospitalization for chf.    Interventions  10/17/18  Advised pt that dr. chaivra is working on who will monitor her wt daily but in the interim she can call me with any questions and dr. chavira will be reviewing her results  Reviewed the parameters of when to notify md 3 lb in a day or 5 lb in a week.  Also advised to log wt and blood pressure together daily.  Pt verbalized understanding  10/16/18  Call and message to rosario with chf digital program.  She responded that the program is closed.  Message to dr. Chavira.  She responded that pt has appointment and she will make efforts to get in some type of digital program.    Plan:    Review nutrition and supplements to increase albumin nepro  Follow up on contact from palliative care team.  Follow up on receipt of education material  Resend if not received next encounter  Review low sodium diet  Follow up on receipt of [employer] OTC catalog      Todays OPCM Self-Management Care Plan was developed with the patients/caregivers input and was based on identified barriers from todays assessment.  Goals were written today with the patient/caregiver and the patient has agreed to work towards these goals to improve his/her overall well-being. Patient verbalized understanding of the care plan, goals, and all of today's instructions. Encouraged patient/caregiver to communicate with his/her physician and health care team about health conditions and the treatment plan.  Provided my contact information today and  encouraged patient/caregiver to call me with any questions as needed.

## 2018-10-24 ENCOUNTER — TELEPHONE (OUTPATIENT)
Dept: ADMINISTRATIVE | Facility: CLINIC | Age: 73
End: 2018-10-24

## 2018-10-24 NOTE — TELEPHONE ENCOUNTER
Home Health SOC 10/16/2018 - 12/14/2018 with [facility] ([place]) - Dr. Rylie Jain. Patient received SN, PT, OT and HHA services.

## 2018-10-26 ENCOUNTER — OFFICE VISIT (OUTPATIENT)
Dept: NEPHROLOGY | Facility: CLINIC | Age: 73
End: 2018-10-26
Payer: MEDICARE

## 2018-10-26 VITALS
DIASTOLIC BLOOD PRESSURE: 80 MMHG | HEART RATE: 59 BPM | HEIGHT: 64 IN | BODY MASS INDEX: 18.93 KG/M2 | OXYGEN SATURATION: 99 % | SYSTOLIC BLOOD PRESSURE: 130 MMHG | WEIGHT: 110.88 LBS

## 2018-10-26 DIAGNOSIS — I15.2 HYPERTENSION DUE TO ENDOCRINE DISORDER: ICD-10-CM

## 2018-10-26 DIAGNOSIS — N18.4 ANEMIA OF CHRONIC RENAL FAILURE, STAGE 4 (SEVERE): ICD-10-CM

## 2018-10-26 DIAGNOSIS — N18.4 CKD (CHRONIC KIDNEY DISEASE), STAGE IV: Primary | ICD-10-CM

## 2018-10-26 DIAGNOSIS — D63.1 ANEMIA OF CHRONIC RENAL FAILURE, STAGE 4 (SEVERE): ICD-10-CM

## 2018-10-26 PROCEDURE — 99999 PR PBB SHADOW E&M-EST. PATIENT-LVL III: CPT | Mod: PBBFAC,HCNC,, | Performed by: INTERNAL MEDICINE

## 2018-10-26 PROCEDURE — 99213 OFFICE O/P EST LOW 20 MIN: CPT | Mod: PBBFAC,HCNC | Performed by: INTERNAL MEDICINE

## 2018-10-26 PROCEDURE — 3079F DIAST BP 80-89 MM HG: CPT | Mod: CPTII,HCNC,, | Performed by: INTERNAL MEDICINE

## 2018-10-26 PROCEDURE — 1101F PT FALLS ASSESS-DOCD LE1/YR: CPT | Mod: CPTII,HCNC,, | Performed by: INTERNAL MEDICINE

## 2018-10-26 PROCEDURE — 3075F SYST BP GE 130 - 139MM HG: CPT | Mod: CPTII,HCNC,, | Performed by: INTERNAL MEDICINE

## 2018-10-26 PROCEDURE — 99214 OFFICE O/P EST MOD 30 MIN: CPT | Mod: S$PBB,HCNC,, | Performed by: INTERNAL MEDICINE

## 2018-10-29 ENCOUNTER — OUTPATIENT CASE MANAGEMENT (OUTPATIENT)
Dept: ADMINISTRATIVE | Facility: OTHER | Age: 73
End: 2018-10-29

## 2018-10-29 ENCOUNTER — PATIENT MESSAGE (OUTPATIENT)
Dept: PRIMARY CARE CLINIC | Facility: CLINIC | Age: 73
End: 2018-10-29

## 2018-10-29 NOTE — PROGRESS NOTES
Summary  Chart reviewed in epic.  Call to pt states she is doing good but she had wt gain today and day prior to total of 2.5 pounds..  States she took her extra lasix but no appreciable output of urine.  She states she will call dr. Templeton or send her a message today.    Intervention  Reviewed blood pressure log.  Range 148//72 had two that were 170's/80  Patient/caregiver will verbalize 2 food items Low Sodium within 2 weeks.  Pt states she has received the information sent in the mail and has reviewed.  Advised we will review low sodium diet today and she verbalized understanding  Diet recall- breakfast-regular oatmeal cooked on stove, lunch baked turkey wings with minimal seasonings, paprika, garlic powder no added salt.  Boxed great value chicken flavored stuffing.  And green beans in can.  Pt states she washed the green beans.  States she ate most of the box of stuffing in 2 servings.  Reviewed the label on the boxed stuffing with pt.  States 1 box is 6 servings and each serving is 460 mg of sodium.  She states she did not realize the relationship of the servings.  Reinforced how to read labels. Also advised that green beans come in salt free that washing the beans may not remove all the salt.  Looked up the salt free and they are available in several brands at [facility].  Pt verbalized understanding.      · Partially met pt has basic understanding but still needs instruction and reinforcement      Plan:  Refer to chronic care program  Review nutrition and supplements to increase albumin nepro  Review low sodium diet  Folow up on receipt of otc catalog  Review signs of lupus      Todays OPCM Self-Management Care Plan was developed with the patients/caregivers input and was based on identified barriers from todays assessment.  Goals were written today with the patient/caregiver and the patient has agreed to work towards these goals to improve his/her overall well-being. Patient verbalized understanding of  the care plan, goals, and all of today's instructions. Encouraged patient/caregiver to communicate with his/her physician and health care team about health conditions and the treatment plan.  Provided my contact information today and encouraged patient/caregiver to call me with any questions as needed.

## 2018-10-30 ENCOUNTER — OFFICE VISIT (OUTPATIENT)
Dept: INTERNAL MEDICINE | Facility: CLINIC | Age: 73
End: 2018-10-30
Payer: MEDICARE

## 2018-10-30 ENCOUNTER — TELEPHONE (OUTPATIENT)
Dept: INTERNAL MEDICINE | Facility: CLINIC | Age: 73
End: 2018-10-30

## 2018-10-30 ENCOUNTER — OFFICE VISIT (OUTPATIENT)
Dept: PRIMARY CARE CLINIC | Facility: CLINIC | Age: 73
End: 2018-10-30
Payer: MEDICARE

## 2018-10-30 VITALS
DIASTOLIC BLOOD PRESSURE: 80 MMHG | WEIGHT: 110 LBS | HEART RATE: 64 BPM | HEIGHT: 64 IN | BODY MASS INDEX: 18.78 KG/M2 | SYSTOLIC BLOOD PRESSURE: 142 MMHG

## 2018-10-30 VITALS
DIASTOLIC BLOOD PRESSURE: 80 MMHG | WEIGHT: 112.44 LBS | OXYGEN SATURATION: 99 % | HEART RATE: 67 BPM | BODY MASS INDEX: 19.2 KG/M2 | HEIGHT: 64 IN | SYSTOLIC BLOOD PRESSURE: 190 MMHG

## 2018-10-30 DIAGNOSIS — I13.0 CARDIORENAL SYNDROME, STAGE 1-4 OR UNSPECIFIED CHRONIC KIDNEY DISEASE, WITH HEART FAILURE: ICD-10-CM

## 2018-10-30 DIAGNOSIS — K21.9 GASTROESOPHAGEAL REFLUX DISEASE, ESOPHAGITIS PRESENCE NOT SPECIFIED: ICD-10-CM

## 2018-10-30 DIAGNOSIS — I35.0 NONRHEUMATIC AORTIC VALVE STENOSIS: ICD-10-CM

## 2018-10-30 DIAGNOSIS — N18.4 CKD (CHRONIC KIDNEY DISEASE), STAGE IV: ICD-10-CM

## 2018-10-30 DIAGNOSIS — E55.9 HYPOVITAMINOSIS D: ICD-10-CM

## 2018-10-30 DIAGNOSIS — I50.32 CHRONIC DIASTOLIC HEART FAILURE: ICD-10-CM

## 2018-10-30 DIAGNOSIS — I25.84 CORONARY ARTERY DISEASE DUE TO CALCIFIED CORONARY LESION: ICD-10-CM

## 2018-10-30 DIAGNOSIS — I77.9 BILATERAL CAROTID ARTERY DISEASE, UNSPECIFIED TYPE: ICD-10-CM

## 2018-10-30 DIAGNOSIS — E87.1 HYPONATREMIA: ICD-10-CM

## 2018-10-30 DIAGNOSIS — I50.31 ACUTE HEART FAILURE WITH NORMAL EJECTION FRACTION: ICD-10-CM

## 2018-10-30 DIAGNOSIS — F32.5 MAJOR DEPRESSIVE DISORDER WITH SINGLE EPISODE, IN FULL REMISSION: ICD-10-CM

## 2018-10-30 DIAGNOSIS — E78.5 DYSLIPIDEMIA: ICD-10-CM

## 2018-10-30 DIAGNOSIS — G63 NEUROPATHY DUE TO SLE (SYSTEMIC LUPUS ERYTHEMATOSUS): ICD-10-CM

## 2018-10-30 DIAGNOSIS — D69.2 SENILE PURPURA: ICD-10-CM

## 2018-10-30 DIAGNOSIS — N25.81 SECONDARY HYPERPARATHYROIDISM OF RENAL ORIGIN: ICD-10-CM

## 2018-10-30 DIAGNOSIS — D63.1 ANEMIA OF CHRONIC RENAL FAILURE, STAGE 4 (SEVERE): ICD-10-CM

## 2018-10-30 DIAGNOSIS — M32.19 OTHER SYSTEMIC LUPUS ERYTHEMATOSUS WITH OTHER ORGAN INVOLVEMENT: Chronic | ICD-10-CM

## 2018-10-30 DIAGNOSIS — E87.20 METABOLIC ACIDOSIS, NORMAL ANION GAP (NAG): ICD-10-CM

## 2018-10-30 DIAGNOSIS — I73.9 PERIPHERAL ARTERIAL DISEASE: ICD-10-CM

## 2018-10-30 DIAGNOSIS — M51.37 DEGENERATION OF LUMBAR OR LUMBOSACRAL INTERVERTEBRAL DISC: ICD-10-CM

## 2018-10-30 DIAGNOSIS — I70.0 AORTIC ATHEROSCLEROSIS: ICD-10-CM

## 2018-10-30 DIAGNOSIS — I15.0 RENOVASCULAR HYPERTENSION: ICD-10-CM

## 2018-10-30 DIAGNOSIS — I1A.0 RESISTANT HYPERTENSION: ICD-10-CM

## 2018-10-30 DIAGNOSIS — M47.812 CERVICAL SPINE ARTHRITIS: ICD-10-CM

## 2018-10-30 DIAGNOSIS — R63.4 WEIGHT LOSS, ABNORMAL: ICD-10-CM

## 2018-10-30 DIAGNOSIS — N18.4 ANEMIA OF CHRONIC RENAL FAILURE, STAGE 4 (SEVERE): ICD-10-CM

## 2018-10-30 DIAGNOSIS — M32.8 OTHER FORMS OF SYSTEMIC LUPUS ERYTHEMATOSUS, UNSPECIFIED ORGAN INVOLVEMENT STATUS: Chronic | ICD-10-CM

## 2018-10-30 DIAGNOSIS — M32.9 NEUROPATHY DUE TO SLE (SYSTEMIC LUPUS ERYTHEMATOSUS): ICD-10-CM

## 2018-10-30 DIAGNOSIS — I50.22 CHRONIC SYSTOLIC CONGESTIVE HEART FAILURE: ICD-10-CM

## 2018-10-30 DIAGNOSIS — Z79.899 POLYPHARMACY: ICD-10-CM

## 2018-10-30 DIAGNOSIS — E04.1 THYROID NODULE: ICD-10-CM

## 2018-10-30 DIAGNOSIS — M85.80 OSTEOPENIA, UNSPECIFIED LOCATION: Chronic | ICD-10-CM

## 2018-10-30 DIAGNOSIS — I25.10 CORONARY ARTERY DISEASE DUE TO CALCIFIED CORONARY LESION: ICD-10-CM

## 2018-10-30 DIAGNOSIS — R73.02 IMPAIRED GLUCOSE TOLERANCE: ICD-10-CM

## 2018-10-30 DIAGNOSIS — F09 COGNITIVE DYSFUNCTION: Primary | ICD-10-CM

## 2018-10-30 DIAGNOSIS — I77.1 TORTUOUS AORTA: ICD-10-CM

## 2018-10-30 DIAGNOSIS — E44.0 MALNUTRITION OF MODERATE DEGREE: ICD-10-CM

## 2018-10-30 DIAGNOSIS — Z00.00 ENCOUNTER FOR PREVENTIVE HEALTH EXAMINATION: Primary | ICD-10-CM

## 2018-10-30 DIAGNOSIS — H53.9 VISION CHANGES: ICD-10-CM

## 2018-10-30 DIAGNOSIS — H40.243 RESIDUAL STAGE OF ANGLE-CLOSURE GLAUCOMA OF BOTH EYES: ICD-10-CM

## 2018-10-30 DIAGNOSIS — I27.81 COR PULMONALE, CHRONIC: ICD-10-CM

## 2018-10-30 DIAGNOSIS — I27.9 CHRONIC PULMONARY HEART DISEASE: ICD-10-CM

## 2018-10-30 DIAGNOSIS — E26.9 HYPERALDOSTERONISM: ICD-10-CM

## 2018-10-30 DIAGNOSIS — E03.9 ACQUIRED HYPOTHYROIDISM: ICD-10-CM

## 2018-10-30 DIAGNOSIS — Z85.038 HISTORY OF COLON CANCER: ICD-10-CM

## 2018-10-30 DIAGNOSIS — Z98.61 POST PTCA: Chronic | ICD-10-CM

## 2018-10-30 DIAGNOSIS — Z90.5 HISTORY OF UNILATERAL NEPHRECTOMY: ICD-10-CM

## 2018-10-30 DIAGNOSIS — I50.32 CHRONIC DIASTOLIC CONGESTIVE HEART FAILURE: ICD-10-CM

## 2018-10-30 PROBLEM — K52.9 GASTROENTERITIS, ACUTE: Status: RESOLVED | Noted: 2018-09-07 | Resolved: 2018-10-30

## 2018-10-30 PROBLEM — T68.XXXA HYPOTHERMIA: Status: RESOLVED | Noted: 2018-09-05 | Resolved: 2018-10-30

## 2018-10-30 PROBLEM — R53.83 FATIGUE: Status: RESOLVED | Noted: 2018-09-05 | Resolved: 2018-10-30

## 2018-10-30 PROBLEM — N17.9 AKI (ACUTE KIDNEY INJURY): Status: RESOLVED | Noted: 2018-04-03 | Resolved: 2018-10-30

## 2018-10-30 PROBLEM — I50.1 PULMONARY EDEMA CARDIAC CAUSE: Status: RESOLVED | Noted: 2018-04-12 | Resolved: 2018-10-30

## 2018-10-30 PROBLEM — Z12.39 BREAST CANCER SCREENING: Status: RESOLVED | Noted: 2018-06-12 | Resolved: 2018-10-30

## 2018-10-30 PROBLEM — R51.9 HEADACHE: Status: RESOLVED | Noted: 2018-01-15 | Resolved: 2018-10-30

## 2018-10-30 PROCEDURE — 3077F SYST BP >= 140 MM HG: CPT | Mod: CPTII,HCNC,, | Performed by: NURSE PRACTITIONER

## 2018-10-30 PROCEDURE — 3077F SYST BP >= 140 MM HG: CPT | Mod: CPTII,HCNC,S$GLB, | Performed by: INTERNAL MEDICINE

## 2018-10-30 PROCEDURE — 1101F PT FALLS ASSESS-DOCD LE1/YR: CPT | Mod: CPTII,HCNC,S$GLB, | Performed by: INTERNAL MEDICINE

## 2018-10-30 PROCEDURE — 3079F DIAST BP 80-89 MM HG: CPT | Mod: CPTII,HCNC,, | Performed by: NURSE PRACTITIONER

## 2018-10-30 PROCEDURE — G0439 PPPS, SUBSEQ VISIT: HCPCS | Mod: HCNC,,, | Performed by: NURSE PRACTITIONER

## 2018-10-30 PROCEDURE — 99215 OFFICE O/P EST HI 40 MIN: CPT | Mod: HCNC,S$GLB,, | Performed by: INTERNAL MEDICINE

## 2018-10-30 PROCEDURE — 3079F DIAST BP 80-89 MM HG: CPT | Mod: CPTII,HCNC,S$GLB, | Performed by: INTERNAL MEDICINE

## 2018-10-30 PROCEDURE — 99215 OFFICE O/P EST HI 40 MIN: CPT | Mod: PBBFAC,HCNC | Performed by: NURSE PRACTITIONER

## 2018-10-30 PROCEDURE — 99499 UNLISTED E&M SERVICE: CPT | Mod: HCNC,S$GLB,, | Performed by: NURSE PRACTITIONER

## 2018-10-30 PROCEDURE — 99499 UNLISTED E&M SERVICE: CPT | Mod: HCNC,S$GLB,, | Performed by: INTERNAL MEDICINE

## 2018-10-30 PROCEDURE — 99999 PR PBB SHADOW E&M-EST. PATIENT-LVL V: CPT | Mod: PBBFAC,HCNC,, | Performed by: NURSE PRACTITIONER

## 2018-10-30 RX ORDER — DOXAZOSIN 2 MG/1
2 TABLET ORAL NIGHTLY
Qty: 90 TABLET | Refills: 3 | Status: SHIPPED | OUTPATIENT
Start: 2018-10-30 | End: 2019-04-11

## 2018-10-30 RX ORDER — METOLAZONE 2.5 MG/1
5 TABLET ORAL DAILY
Qty: 180 TABLET | Refills: 3 | Status: SHIPPED | OUTPATIENT
Start: 2018-10-30 | End: 2020-02-17

## 2018-10-30 RX ORDER — TRIAMCINOLONE ACETONIDE 1 MG/G
PASTE DENTAL 2 TIMES DAILY PRN
Qty: 10 G | Refills: 3 | Status: SHIPPED | OUTPATIENT
Start: 2018-10-30 | End: 2018-11-29

## 2018-10-30 NOTE — Clinical Note
Hi Dr Epperson,Could you send through [employer] or mail any instructions you gave the patient at your last appointmetn? There is no note at this time from your 10/26 encounter, so I could not reinforce any of your recommendations.Thanks,WENDY (PCP)

## 2018-10-30 NOTE — PATIENT INSTRUCTIONS
TODAY:  - PCP to message Dr Burdick about increasing her BB   + will increase coreg/carvedilol to 12.5mg at next pill packing  - restart metolazone 5mg before lasix   + script sent to [employer]  - take cardura every evening   + script sent to [employer]  - nutrition appointment  - PCP to message Dr James (Neurology) about neurocognitive testing to work-up vision changes  - PCP to message Dr Ramirez about possible lupus involvement in the brain   - triamcinolone dental paste sent to [facility]  - PCP to message Dr Epperson about next steps  - PCP messaged [facility] NP Albertina Veliz about monthly check-in visits in the home

## 2018-10-30 NOTE — ASSESSMENT & PLAN NOTE
Hallucinations, not ocular in origin (seen by ophthalmology), is on steroids for lupus flair  · F/U Vascular Neurology  · MRI and EEG with no acute abnormalities  · May need neurocognitive assessment  · F/U Rheum regarding possible lupus neuro involvement

## 2018-10-30 NOTE — Clinical Note
Hi Dr James (NEURO)- Do you recommend with proceeding with neurocognitive testing for Ms Clayton? Her EEG and MRI showed no acute abnormalities, but she continues to have vision changes.Dr Ramirez (RHEUM)- is it possible that Ms Clayton has neuropsychiatric lupus involvement? She has vision changes with benign work-up by Optho. Is mid-workup with Neuro. MRI showed chronic changes, but nothing acute.Tiffani (PCP)

## 2018-10-30 NOTE — PROGRESS NOTES
"Primary Care Provider Appointment    Subjective:      Patient ID: Ewelina Arnold is a 72 y.o. female with decompensated CHF, CAD, ACI    Chief Complaint: Hyperlipidemia and Follow-up    Patient with better control of her CHF. Was recently admitted and in rehab for CHF exacerbation. Is stable at this time. She restarted metolazone 5mg prior to lasix. She states that yesterday she had "a lot of fluid, it might have been something I ate." Her dry weight is 107, she's 110# today with clothes and 109# at home. She is participating in the digital scale program with controlled weights. She is taking lasix 40mg BID, added metolazone 5mg yesterday.    Her BP is elevated today, numerous meds discontinued or decreased at last hospital discharge. Her coreg is lower dose and cardura stopped since last hospital admit. Willing to start cardura PRN today. Next CHF Cardiology appt in 1/2019, but patient may need up titration of BB before then.    Patient wanting follow-up with Neuro and a nutrition referral. She has abnormal vision "these little waves going up and down" and was referred to Neuro. EEG showed no seizure activity. MRI showed chronic changes. Neuro recommended neurocognitive eval as next step.      Pills packed as follows:  Adherence packed for 30 days using [employer] monthly packs:     Morning card:  Asprin 81 mg  Carvedlol 6.25 mg  Citalopram 20 mg (1&1/2 tabs)  Famotidine 20 mg  Hydroxychloroquine 200 mg  Isosorbide Mononitrate ER 60 mg  Levothyroxine 75 mcg  Nifedipine ER Osmotic 60 mg  Sodium Bicarb 650 mg        Evening card:  Atorvastatin 80 mg  Carvedilol 6.25 mg  Hydroxychloroquine 200 mg  Nifedipine ER Osmotic 60 mg  Sodium Bicarb 650 mg        Doxazosin 2 mg not packed, gave a bottle of 30 pills   Furosemide 40mg not packed-pt to take as needed  Pt prefers to get meds from [facility] and brings it to pharmacy.  Nifedipine ER fill at [facility] through [facility]   Irbesartan or hydralazine 100mg " d/c     *Patient took back meds.           Past Surgical History:   Procedure Laterality Date    BIOPSY-BONE MARROW Left 10/16/2017    Performed by Grant Santillan MD at [facility] OR 2ND FLR    CARDIAC CATHETERIZATION  07/27/2011    x1    CHOLECYSTECTOMY  1999    COLON SURGERY  2000 & 2003    partial removal    COLONOSCOPY N/A 4/14/2016    Procedure: COLONOSCOPY;  Surgeon: Jose Steen MD;  Location: [facility] (4TH FLR);  Service: Endoscopy;  Laterality: N/A;  prep 2 days prior light meals only/clear liquid day before  and 4 dulcolax tabs (5 mgs) at noon    COLONOSCOPY N/A 9/14/2017    Procedure: COLONOSCOPY;  Surgeon: Jose Steen MD;  Location: [facility] (4TH FLR);  Service: Endoscopy;  Laterality: N/A;    COLONOSCOPY N/A 9/14/2017    Performed by Jose Steen MD at [facility] ENDO (4TH FLR)    COLONOSCOPY N/A 4/14/2016    Performed by Jose Steen MD at [facility] (4TH FLR)    COLONOSCOPY N/A 4/23/2013    Performed by Jose Steen MD at [facility] (4TH FLR)    TALIA-TRANSFORAMINAL Left 11/3/2016    [facility] at [facility]    ESOPHAGOGASTRODUODENOSCOPY (EGD) N/A 3/16/2017    [facility]esh [facility]andes [facility] at [facility] ENDO    EYE SURGERY      HAND SURGERY Bilateral 1996 & 1997    due to carpal tunnel     HERNIA REPAIR      HYSTERECTOMY  1983    INJECTION-STEROID-EPIDURAL-TRANSFORAMINAL Left 1/7/2016    Performed by [facility] at [facility]    Left heart cath Left 12/20/2017    Performed by Chandan Ly MD at [facility] CATH LAB    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    OOPHORECTOMY      removed one    Peripheral Iridotomy both eyes  1994    laser angle correction    THYROIDECTOMY, PARTIAL  2006    to remove two nodules and one-half thyroid       Past Medical History:   Diagnosis Date    Acute hypoxemic respiratory failure 4/28/2018    Acute on chronic diastolic congestive heart failure 11/17/2017    Allergy     Anatomical narrow angle  "glaucoma     Anemia     States diagnosed about a month ago    Aortic atherosclerosis     Arthritis     Cataract     CHF (congestive heart failure)     Chronic kidney disease     Chronic sciatica of left side     Right lower back pain due to sciatica    Coronary artery disease     Depression     Dry eyes     GERD (gastroesophageal reflux disease)     History of colon cancer     Hyperaldosteronism     Hyperlipidemia     Hypertension     Hypothyroidism     Keloid cicatrix     Left ventricular diastolic dysfunction with preserved systolic function     Lupus (systemic lupus erythematosus) 8/17/2012    Malnutrition 5/16/2017    Osteoarthritis of cervical spine 8/17/2012    Osteopenia     PAD (peripheral artery disease)     FRAN (renal artery stenosis)        Review of Systems   Constitutional: Positive for activity change. Negative for appetite change.   Respiratory: Positive for shortness of breath. Negative for cough.    Cardiovascular: Negative for leg swelling.   Genitourinary: Negative for difficulty urinating and dysuria.   Skin: Positive for wound.   Neurological: Positive for dizziness. Negative for light-headedness.   Hematological: Bruises/bleeds easily.   Psychiatric/Behavioral: Negative for agitation, behavioral problems, confusion, decreased concentration and dysphoric mood.       Objective:   BP (!) 190/80 (BP Location: Left arm, Patient Position: Sitting, BP Method: Medium (Manual))   Pulse 67   Ht 5' 4" (1.626 m)   Wt 51 kg (112 lb 7 oz)   LMP  (LMP Unknown) Comment: 99  SpO2 99%   BMI 19.30 kg/m²     Physical Exam   Constitutional: She is oriented to person, place, and time. She appears well-developed and well-nourished.   HENT:   Head: Normocephalic and atraumatic.   Neck: Normal range of motion.   Pulmonary/Chest: Effort normal.   Decreased breath sounds   Musculoskeletal: She exhibits deformity. She exhibits no edema.   Resolution of LLE nodule   Neurological: She is alert " and oriented to person, place, and time.   Psychiatric: She has a normal mood and affect. Her behavior is normal. Thought content normal.   Vitals reviewed.      Lab Results   Component Value Date    WBC 3.07 (L) 10/17/2018    HGB 8.0 (L) 10/17/2018    HCT 25.9 (L) 10/17/2018     10/17/2018    CHOL 181 08/28/2018    TRIG 54 08/28/2018    HDL 66 08/28/2018    ALT 11 09/25/2018    AST 26 09/25/2018     10/17/2018    K 4.0 10/17/2018     10/17/2018    CREATININE 2.9 (H) 10/17/2018    BUN 57 (H) 10/17/2018    CO2 23 10/17/2018    TSH 0.954 09/05/2018    INR 1.0 09/05/2018    GLUF 79 12/02/2011    HGBA1C 5.2 09/25/2018         Assessment:   72 y.o. female with multiple co-morbid illnesses here to continue work-up of chronic issues notably decompensated CHF, CAD, ACI     Plan:     Problem List Items Addressed This Visit        Ophtho    Vision changes     Hallucinations, not ocular in origin (seen by ophthalmology), is on steroids for lupus flair  · F/U Vascular Neurology  · MRI and EEG with no acute abnormalities  · May need neurocognitive assessment  · F/U Rheum regarding possible lupus neuro involvement            Cardiac/Vascular    Resistant hypertension     Newly compliant with BP and CHF meds  · Advised to purchase pediatric cuff  · PCP to manage BP  · Lasix BID  · Add metolazone  · Increase coreg to 12.5mg BID at next pill pack  · PCP messaged cardiology (Dr Burdick) regarding this  · Continue isosorbide mononitrate 60mg, nifedipine 60mg BID         Relevant Medications    doxazosin (CARDURA) 2 MG tablet    Cardiorenal syndrome, stage 1-4 or unspecified chronic kidney disease, with heart failure     Patient with worsening kidney function in the setting of decompensated CHF  · Continue frequent diuresis  · F/U Nephrology for progression to dialysis (if warranted)         Relevant Medications    doxazosin (CARDURA) 2 MG tablet    Other Relevant Orders    Ambulatory consult to Nutrition Services        Immunology/Multi System    Other forms of systemic lupus erythematosus (Chronic)     Diagnosed in 1990s, pos CARYN, SSB. Biopsy showed cutaneous involvment  · Continue plaquenil  · Completed prednisone for erythema nodosum  · F/U Rheum  · PCP to message Dr Ramirez about vision changes         Relevant Medications    triamcinolone acetonide 0.1% (KENALOG) 0.1 % paste    Other Relevant Orders    Ambulatory consult to Nutrition Services       Oncology    Anemia of chronic renal failure, stage 4 (severe)     Followed by Nephrology, PRN iron infusions  · F/U Nephrology (Dr Epperson)  · Iron infusion PRN  · May need erythropoetin         Relevant Orders    Ambulatory consult to Nutrition Services       Other    Polypharmacy     Complicated regimen with numerous cardiac and nephro meds, at risk for hospitalization due to med noncompliance  · Pill packing at pharmacy  · Close follow-up           Other Visit Diagnoses     Chronic diastolic congestive heart failure        Relevant Medications    metOLazone (ZAROXOLYN) 2.5 MG tablet          Health Maintenance       Date Due Completion Date    DEXA SCAN 01/05/2019 1/5/2016    High Dose Statin 10/30/2019 10/30/2018    Mammogram 09/11/2020 9/11/2018    Colonoscopy 09/14/2022 9/14/2017    Lipid Panel 08/28/2023 8/28/2018    TETANUS VACCINE 06/29/2026 6/29/2016          Follow-up in about 6 weeks (around 12/11/2018). . One hour spent with this patient today, half of that in counseling.    Amarilys Johns MD/MPH  Internal Medicine  [facility]  [phone]

## 2018-10-30 NOTE — PROGRESS NOTES
"Ewelina Arnold presented for a  Medicare AWV and comprehensive Health Risk Assessment today. The following components were reviewed and updated:    · Medical history  · Family History  · Social history  · Allergies and Current Medications  · Health Risk Assessment  · Health Maintenance  · Care Team     ** See Completed Assessments for Annual Wellness Visit within the encounter summary.**       The following assessments were completed:  · Living Situation  · CAGE  · Depression Screening  · Timed Get Up and Go  · Whisper Test  · Cognitive Function Screening  ·   ·   ·   · Nutrition Screening  · ADL Screening  · PAQ Screening    Vitals:    10/30/18 0831   BP: (!) 142/80   BP Location: Left arm   Pulse: 64   Weight: 49.9 kg (110 lb 0.2 oz)   Height: 5' 4" (1.626 m)     Body mass index is 18.88 kg/m².  Physical Exam   Constitutional: She is oriented to person, place, and time.   Frail   Musculoskeletal:   In wheelchair today   Neurological: She is alert and oriented to person, place, and time.   Skin: Skin is warm and dry.   Psychiatric: She has a normal mood and affect.   Nursing note and vitals reviewed.        Diagnoses and health risks identified today and associated recommendations/orders:    1. Encounter for preventive health examination  Here for Health Risk Assessment/Annual Wellness Visit.  Health maintenance reviewed and updated. Follow up in one year.    2. Renovascular hypertension  Chronic, B/P mildly elevated today. Seeing PCP today.    3. Resistant hypertension  Chronic, B/P mildly elevated today. Seeing PCP today.    4. Tortuous aorta  Chronic, stable on current medications. Noted CXR 7/05/16. Followed by PCP.    5. Aortic atherosclerosis  Chronic, stable on current medications.Noted CXR 7/05/16.  Followed by PCP.    6. Chronic diastolic heart failure  Chronic, stable on current medications. Followed by PCP.    7. Chronic systolic congestive heart failure  Chronic, stable on current medications. Followed by " PCP.    8. Acute heart failure with normal ejection fraction  Chronic, stable on current medications. Followed by PCP, Cardiology.    9. Bilateral carotid artery disease, unspecified type  Chronic, stable on current medications. Followed by PCP.    10. Cardiorenal syndrome, stage 1-4 or unspecified chronic kidney disease, with heart failure  Chronic, stable on current medications. Followed by PCP, Cardiology, Nephrology.    11. Cor pulmonale, chronic  Chronic, stable on current medications. Followed by PCP, Cardiology.    12. Chronic pulmonary heart disease  Chronic, stable on current medications. Followed by PCP.    13. Coronary artery disease due to calcified coronary lesion  Chronic, stable on current medications. Followed by PCP, Cardiology.    14. Post PTCA  Stable on current medications. Followed by PCP, Cardiology.    15. Nonrheumatic aortic valve stenosis  Chronic, stable. Followed by PCP, Cardiology.    16. Peripheral arterial disease  Chronic, stable on current medications. Followed by PCP.    17. Dyslipidemia  Chronic, stable on current medications. Followed by PCP.    18. CKD (chronic kidney disease), stage IV  Chronic, stable on current medications. Followed by PCP, Nephrology.    19. History of unilateral nephrectomy LEFT  Chronic, stable on current medications. Followed by PCP.    20. Hyponatremia  Chronic, stable on current medication. Followed by PCP, Nephrology.    21. Metabolic acidosis, normal anion gap (NAG)  Chronic, stable on current medications. Followed by PCP, Nephrology.    22. Hyperaldosteronism  Chronic, stable. Followed by PCP, nephrology.    23. Other forms of systemic lupus erythematosus, unspecified organ involvement status  Chronic, stable on current medication. Followed by PCP, Rheumatology.    24. Anemia of chronic renal failure, stage 4 (severe)  Chronic, stable. Followed by PCP, Nephrology.    25. Secondary hyperparathyroidism of renal origin  Chronic, stable. Followed by PCP,  Nephrology.    26. History of colon cancer  Stable.  Followed by PCP.    27. Acquired hypothyroidism  Chronic, stable on current medication. Followed by PCP.    28. Osteopenia, unspecified location  Chronic, stable on current medications. Followed by PCP.    29. Hypovitaminosis D  Chronic, stable on current medication. Followed by PCP.    30. Impaired glucose tolerance  Chronic, stable with diet. Followed by PCP.    31. Malnutrition of moderate degree  Chronic, 19 pound weight loss since AWV/HRA 7/2017. Last albumin 2.1. Followed by PCP.    32. Weight loss, abnormal  Chronic, weight decreased 19 pounds from AWV/HRA 7/2017. Followed by PCP.    33. Thyroid nodule  Chronic, stable. Followed by PCP.    34. Gastroesophageal reflux disease, esophagitis presence not specified  Chronic, stable on current medication. Followed by PCP.    35. Cervical spine arthritis  Chronic, stable. Noted XR 6/13/12.  Followed by PCP.    36. Senile purpura  Chronic, stable. Followed by PCP.    37. Degeneration of lumbar or lumbosacral intervertebral disc  Chronic, stable Followed by PCP, Physical Medicine.    38. 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 Followed by PCP.    39. Major depressive disorder with single episode, in full remission  Chronic, stable on current medication. PHQ-2 score 1. Followed by PCP.    40. Residual stage of angle-closure glaucoma of both eyes  Chronic, stable on current medication. Followed by Ophthalmology.      Provided Ewelina with a 5-10 year written screening schedule and personal prevention plan. Recommendations were developed using the USPSTF age appropriate recommendations. Education, counseling, and referrals were provided as needed. After Visit Summary printed and given to patient which includes a list of additional screenings\tests needed.    Follow up PCP; 10/30/2018    Beatriz Jenkins NP

## 2018-10-30 NOTE — PATIENT INSTRUCTIONS
Counseling and Referral of Other Preventative  (Italic type indicates deductible and co-insurance are waived)    Patient Name: Ewelina Arnold  Today's Date: 10/30/2018    Health Maintenance       Date Due Completion Date    DEXA SCAN 01/05/2019 1/5/2016    High Dose Statin 10/30/2019 10/30/2018    Mammogram 09/11/2020 9/11/2018    Colonoscopy 09/14/2022 9/14/2017    Lipid Panel 08/28/2023 8/28/2018    TETANUS VACCINE 06/29/2026 6/29/2016        No orders of the defined types were placed in this encounter.    The following information is provided to all patients.  This information is to help you find resources for any of the problems found today that may be affecting your health:                Living healthy guide: www.[facility].[place].gov      Understanding Diabetes: www.diabetes.org      Eating healthy: www.cdc.gov/healthyweight      [facility] home safety checklist: www.cdc.gov/steadi/patient.html      Agency on Aging: www.goea.[place].gov      Alcoholics anonymous (AA): www.aa.org      Physical Activity: www.lolis.nih.gov/rc2jqeo      Tobacco use: www.quitwithusla.org

## 2018-10-30 NOTE — ASSESSMENT & PLAN NOTE
Newly compliant with BP and CHF meds  · Advised to purchase pediatric cuff  · PCP to manage BP  · Lasix BID  · Add metolazone  · Increase coreg to 12.5mg BID at next pill pack  · PCP messaged cardiology (Dr Burdick) regarding this  · Continue isosorbide mononitrate 60mg, nifedipine 60mg BID

## 2018-10-30 NOTE — ASSESSMENT & PLAN NOTE
Followed by Nephrology, PRN iron infusions  · F/U Nephrology (Dr Epperson)  · Iron infusion PRN  · May need erythropoetin

## 2018-10-30 NOTE — PROGRESS NOTES
I offered to discuss end of life issues, including information on how to make advance directives that the patient could use to name someone who would make medical decisions on their behalf if they became too ill to make themselves.    ___Patient declined  _X_Patient is interested, I provided paper work and offered to discuss. Reports that she has been given paperwork and is discussing with family.

## 2018-10-30 NOTE — MEDICAL/APP STUDENT
"71 y/o female with past medical history of HTN, CKD Stage IV,     Reports of fluid retention. Is taking lasix, 40mg am and pm. Took metolazone. Both diuretics helped. No shortness of breath. Able to ambulate around without any issues. Denies any headaches, dizziness, pre-syncope.    Currently also complains of continued visual issues. Reports them "wavy". When she closes her eyes she still sees it. Reports at one point there was hallucinations. Patient was referred to ophthal which then referred her to neurology. EEG studies show mild abnormal activity but non-specific, per report. No other follow-up was completed yet. Would like to see Neurology.    Patient wanted to know results of her EEG and cardiac US studies. Relayed that EEG and cardiac studies with patient.    Today's patient blood pressure is measured to be 180/90. Relayed she will probably need to resume her doxazosin nightly.     Patient would like a referral to dietician. Would also like to know when she can drive. Would like triamcinolone acetonide dental paste for her SLE uclers. Would like it sent to [facility] on [address].     Denies any symptoms of headaches, nausea, vomiting, chest pain, chest tightness, abdominal pain.    No other complaints at this time.  "

## 2018-10-30 NOTE — TELEPHONE ENCOUNTER
Do you set her up with the neuropsych testing, or should I?     Thanks,  KJ  ----- Message from Hubert James MD sent at 10/30/2018  1:02 PM CDT -----  Hi,  I agree, I think that neuropsychological testing will be a useful tool.  OC  ----- Message -----  From: Amarilys Johns MD  Sent: 10/30/2018  12:20 PM  To: Hubert James MD    Hi Dr James (NEURO)- Do you recommend with proceeding with neurocognitive testing for Ms Arnold? Her EEG and MRI showed no acute abnormalities, but she continues to have vision changes.    Dr Ramirez (RHEUM)- is it possible that Ms Clayton has neuropsychiatric lupus involvement? She has vision changes with benign work-up by Optho. Is mid-workup with Neuro. MRI showed chronic changes, but nothing acute.    Thanks  WENDY (PCP)

## 2018-10-30 NOTE — ASSESSMENT & PLAN NOTE
Diagnosed in 1990s, pos CARYN, SSB. Biopsy showed cutaneous involvment  · Continue plaquenil  · Completed prednisone for erythema nodosum  · F/U Rheum  · PCP to message Dr Ramirez about vision changes

## 2018-10-31 ENCOUNTER — PATIENT MESSAGE (OUTPATIENT)
Dept: PRIMARY CARE CLINIC | Facility: CLINIC | Age: 73
End: 2018-10-31

## 2018-11-01 NOTE — PROGRESS NOTES
Subjective:       Patient ID: Ewelina Arnold is a 72 y.o. Black or  female who presents for re-evaluation of progression of Chronic Kidney Disease    HPI    Ms. 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  Patient recently admitted for hypoxemic respiratory failure from acute decompensated heart failure, diuresed with subsequent hypotension, therefore diuretic regimen adjusted prior to discharge to SNF, then directed to use as indicated for dry weight of ~107kg.  Patient reports daily weights have been relatively stable since on current regimen, denies shortness of breath.  She reports blood pressure improved and relatively stable since discharge as well.  She otherwise denies any fever, chest pain, shortness of breath, abdominal pain, dysuria/hematuria.     Review of Systems   Constitutional: Negative for appetite change, fatigue and fever.   Respiratory: Negative for chest tightness and shortness of breath.    Cardiovascular: Negative for chest pain and leg swelling.   Gastrointestinal: Negative for abdominal pain, constipation, diarrhea, nausea and vomiting.   Genitourinary: Negative for difficulty urinating, dysuria, flank pain, frequency, hematuria and urgency.   Musculoskeletal: Negative for arthralgias, joint swelling and myalgias.   Skin: Negative for rash and wound.   Neurological: Negative for dizziness, weakness and light-headedness.   All other systems reviewed and are negative.      Objective:      Physical Exam   Constitutional: She appears well-developed and well-nourished.   Cardiovascular: Normal rate, regular rhythm and normal heart sounds. Exam reveals no gallop and no friction rub.   No murmur heard.  Pulmonary/Chest: Effort normal and breath sounds normal. No respiratory  distress. She has no wheezes. She has no rales.   Abdominal: Soft. Bowel sounds are normal. There is no tenderness.   Musculoskeletal: She exhibits no edema.   Neurological: She is alert.   Skin: Skin is warm and dry. No rash noted. No erythema.   Psychiatric: She has a normal mood and affect.       Assessment:       1. CKD (chronic kidney disease), stage IV    2. Hypertension due to endocrine disorder    3. Solitary kidney    4. Anemia of chronic renal failure, stage 4 (severe)        Plan:     Ms. 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  Patient previously with acute kidney injury, likely due to cardiomyopathy and moderate volume depletion with diuresis, creatinine improved prior to and since discharge.  Will trend symptoms/creatinine closely, will phone review BP diary and daily weights within a few days.  Encouraged oral/fluid intake for now, suspect dry weight ~107kg, further recommendations pending results of above, patient voiced understanding of above, agreeable to plan as noted.    - Anemia: Hgb below goal, given IV iron, will consider erythropoetin therapy now that BP controlled  - Bone/mineral metabolism: patient with secondary hyperparathyroidism, PTH at goal for stage CKD, patient on ergocalciferol for VitD deficiency    Return to clinic in 6-8 weeks with renal/heme panel, iron/TIBC/ferritin, urinalysis/culture, urine protein/creatinine ratio prior to next visit

## 2018-11-07 ENCOUNTER — TELEPHONE (OUTPATIENT)
Dept: INTERNAL MEDICINE | Facility: CLINIC | Age: 73
End: 2018-11-07

## 2018-11-07 NOTE — TELEPHONE ENCOUNTER
Please check on patient as her next appt with us is in one month. How is her heart failure, blood pressure, weight? Does she want to come in sooner?    Thanks,  KJ

## 2018-11-08 ENCOUNTER — TELEPHONE (OUTPATIENT)
Dept: INTERNAL MEDICINE | Facility: CLINIC | Age: 73
End: 2018-11-08

## 2018-11-08 NOTE — TELEPHONE ENCOUNTER
Check on how she's doing now.    TUMS is a good place to start. Is she having diarrhea, when was her last bowel movement?    Thanks,  KJ  ----- Message from Albertina Scott MA sent at 11/8/2018  8:27 AM CST -----  Contact: Patient [phone]  Please, advise.   ----- Message -----  From: Whitley Varma  Sent: 11/8/2018   8:17 AM  To: Nat Canseco Staff    Patient reported that her stomach is cramping. What should patient take.      Please call and advise  Thank you

## 2018-11-09 ENCOUNTER — LAB VISIT (OUTPATIENT)
Dept: LAB | Facility: HOSPITAL | Age: 73
End: 2018-11-09
Attending: INTERNAL MEDICINE
Payer: MEDICARE

## 2018-11-09 ENCOUNTER — TELEPHONE (OUTPATIENT)
Dept: INTERNAL MEDICINE | Facility: CLINIC | Age: 73
End: 2018-11-09

## 2018-11-09 ENCOUNTER — NUTRITION (OUTPATIENT)
Dept: NUTRITION | Facility: CLINIC | Age: 73
End: 2018-11-09
Payer: MEDICARE

## 2018-11-09 VITALS — BODY MASS INDEX: 18.48 KG/M2 | HEIGHT: 64 IN | WEIGHT: 108.25 LBS

## 2018-11-09 DIAGNOSIS — N18.4 CKD (CHRONIC KIDNEY DISEASE), STAGE IV: ICD-10-CM

## 2018-11-09 DIAGNOSIS — N18.4 STAGE 4 CHRONIC KIDNEY DISEASE: Primary | ICD-10-CM

## 2018-11-09 DIAGNOSIS — E55.9 HYPOVITAMINOSIS D: ICD-10-CM

## 2018-11-09 DIAGNOSIS — Z71.3 DIETARY COUNSELING: ICD-10-CM

## 2018-11-09 DIAGNOSIS — I1A.0 RESISTANT HYPERTENSION: Primary | ICD-10-CM

## 2018-11-09 DIAGNOSIS — I50.32 CHRONIC DIASTOLIC HEART FAILURE: ICD-10-CM

## 2018-11-09 LAB
ALBUMIN SERPL BCP-MCNC: 2.8 G/DL
ANION GAP SERPL CALC-SCNC: 9 MMOL/L
BUN SERPL-MCNC: 46 MG/DL
CALCIUM SERPL-MCNC: 9.1 MG/DL
CHLORIDE SERPL-SCNC: 103 MMOL/L
CO2 SERPL-SCNC: 29 MMOL/L
CREAT SERPL-MCNC: 3.1 MG/DL
EST. GFR  (AFRICAN AMERICAN): 16.6 ML/MIN/1.73 M^2
EST. GFR  (NON AFRICAN AMERICAN): 14.4 ML/MIN/1.73 M^2
GLUCOSE SERPL-MCNC: 95 MG/DL
PHOSPHATE SERPL-MCNC: 4 MG/DL
POTASSIUM SERPL-SCNC: 4 MMOL/L
SODIUM SERPL-SCNC: 141 MMOL/L

## 2018-11-09 PROCEDURE — 36415 COLL VENOUS BLD VENIPUNCTURE: CPT | Mod: HCNC

## 2018-11-09 PROCEDURE — 97802 MEDICAL NUTRITION INDIV IN: CPT | Mod: HCNC,S$GLB,, | Performed by: DIETITIAN, REGISTERED

## 2018-11-09 PROCEDURE — 80069 RENAL FUNCTION PANEL: CPT | Mod: HCNC

## 2018-11-09 PROCEDURE — 99999 PR PBB SHADOW E&M-EST. PATIENT-LVL III: CPT | Mod: PBBFAC,HCNC,,

## 2018-11-09 RX ORDER — ERGOCALCIFEROL 1.25 MG/1
50000 CAPSULE ORAL
Qty: 12 CAPSULE | Refills: 3 | Status: SHIPPED | OUTPATIENT
Start: 2018-11-09 | End: 2019-12-05

## 2018-11-09 RX ORDER — CARVEDILOL 12.5 MG/1
12.5 TABLET ORAL 2 TIMES DAILY WITH MEALS
Qty: 180 TABLET | Refills: 3 | Status: SHIPPED | OUTPATIENT
Start: 2018-11-09 | End: 2019-01-08

## 2018-11-09 NOTE — PROGRESS NOTES
"Referring Physician:Amarilys Johns, *     Reason for visit:  Chief Complaint   Patient presents with    Congestive Heart Failure    Chronic Kidney Disease    Nutrition Counseling      Initial Visit    :1945     Allergies Reviewed  Meds Reviewed    Anthropometrics  Weight:49.1 kg (108 lb 3.9 oz)  Height:5' 4" (1.626 m)  BMI:Body mass index is 18.58 kg/m².   IBW:   54.5 kg  +/-10%  DRY WT:  107 lbs    Meds:  Outpatient Medications Prior to Visit   Medication Sig Dispense Refill    aspirin (ECOTRIN) 81 MG EC tablet Take 1 tablet (81 mg total) by mouth once daily. 30 tablet 0    atorvastatin (LIPITOR) 80 MG tablet Take 1 tablet (80 mg total) by mouth once daily. 90 tablet 0    calcipotriene 0.005 % sclp soln Apply 1 application topically 2 (two) times daily. (Patient taking differently: Apply 1 application topically 2 (two) times daily as needed. ) 60 mL 3    carvedilol (COREG) 6.25 MG tablet Take 1 tablet (6.25 mg total) by mouth 2 (two) times daily. 60 tablet 11    citalopram (CELEXA) 20 MG tablet TAKE 1 AND 1/2  TABLETS (30 MG TOTAL) ONCE DAILY. 135 tablet 1    clobetasol (TEMOVATE) 0.05 % external solution Apply topically 2 (two) times daily. (Patient taking differently: Apply topically 2 (two) times daily as needed. ) 50 mL 2    doxazosin (CARDURA) 2 MG tablet Take 1 tablet (2 mg total) by mouth every evening. 90 tablet 3    ergocalciferol (VITAMIN D2) 50,000 unit Cap Take 1 capsule (50,000 Units total) by mouth every 7 days. 12 capsule 3    famotidine (PEPCID) 20 MG tablet Take 1 tablet (20 mg total) by mouth once daily. 90 tablet 3    furosemide (LASIX) 40 MG tablet Take 1 tablet (40 mg total) by mouth daily as needed (for weight gain, shortness of breath). 90 tablet 3    hydroxychloroquine (PLAQUENIL) 200 mg tablet TAKE 1 TABLET TWICE DAILY 180 tablet 1    isosorbide mononitrate (IMDUR) 60 MG 24 hr tablet Take 1 tablet (60 mg total) by mouth once daily. 90 tablet 3    latanoprost " 0.005 % ophthalmic solution INSTILL 1 DROP INTO BOTH EYES EVERY DAY 3 Bottle 3    levothyroxine (SYNTHROID) 75 MCG tablet Take 1 tablet (75 mcg total) by mouth before breakfast. 90 tablet 3    metOLazone (ZAROXOLYN) 2.5 MG tablet Take 2 tablets (5 mg total) by mouth once daily. 180 tablet 3    NIFEdipine (PROCARDIA-XL) 60 MG (OSM) 24 hr tablet Take 1 tablet (60 mg total) by mouth 2 (two) times daily. 60 tablet 3    sodium bicarbonate 650 MG tablet Take 1 tablet (650 mg total) by mouth 2 (two) times daily. 60 tablet 3    triamcinolone acetonide 0.1% (KENALOG) 0.1 % paste Place onto teeth 2 (two) times daily as needed. 10 g 3     No facility-administered medications prior to visit.          Vitamins/Supplements/Herbs:  Vit D    Labs:   10/17/18  eGFR  18.0   Cr  2.9   Gluc  69   K+  4.0     Nutrition Prescription:   1635 Kcals/day( 30 kcal/kg IBW),    44 g protein( 0.8 g/kg IBW)     Support System:    Pt lives alone.  Pt has her daughter do her grocery shopping for her, with a list of items to buy.    Diet Hx:   Pt states she follows a low sodium diet, and is lactose-intolerant.  Avoids all dairy products.  Pt states she isn't eating much, because she's not sure what to eat.  During our discussion, pt also stated sometimes she doesn't have much of a selection of foods to prepare.  Likes to fix homemade soups.  Pt does read food labels for sodium content.  Pt states she has been hospitalized several times over the past year, with unintentional weight loss.     Breakfast:   This morning:  Hash brown rogelio with 2 eggs.  Drinks almond or SILK milk  Lunch:   May fix sandwich or have whatever is available in her refrigerator.  Dinner:   Last night:  Homemade vegetable beef soup with unsalted crackers, avocado, 7-Up.  Snacks:  Fresh fruit (apple) or english muffin with butter and jelly or peanut butter and jelly sandwich on wheat    Current activity level and/or physical limitations:    No physical  limitations    Motivation to make changes/anticipated barriers and/or expected adherence:    Pt very interested in adherence to regain some weight    Nutrition-Focus Physical Findings:    Appears well nourished      Assessment:   Pt very attentive and asked numerous relevant questions about foods recommended and to avoid; reading food labels; how to season foods without using salt; grocery shopping and meal planning tips; healthy snacks; sample meal plans and menus.  All questions answered, and pt took extensive notes on handouts to remind herself later of what we discussed.  Pt verbalized understanding of information.    Nutrition Diagnosis:   Food- and nutrition-related knowledge deficit RT lack of prior exposure to information AEB dx CHF/CKD/unintentional weight loss    Recommendations:   2 gram sodium, high calorie diet. Aim for 6 small meals/snacks per day to increase caloric intake.  Handouts provided & reviewed:  Low Sodium Nutrition Therapy; Sodium-free Flavoring Tips; Acceptable Salt-free Seasoning Blends; High Calorie, High Protein Nutrition Therapy      Strategies Implemented:    Include between meal snacks in diet regimen.  Plan weekly menus to assist with grocery shopping.    Consultation Time:45 minutes.    Follow Up:  Pt provided with dietitian contact number and advised to call with questions or make future appointment if further intervention needed.   months.

## 2018-11-09 NOTE — PATIENT INSTRUCTIONS
2 gram sodium, high calorie diet.  Aim for 6 small meals/snacks per day to increase caloric intake.

## 2018-11-09 NOTE — PROGRESS NOTES
Adherence packed for 30 days using [employer] DOS monthly packs:     Morning card:  Asprin 81 mg  Carvedlol 12.5 mg  Citalopram 20 mg (1&1/2 tabs)  Famotidine 20 mg  Hydroxychloroquine 200 mg  Isosorbide Mononitrate ER 60 mg  Levothyroxine 75 mcg  Nifedipine ER Osmotic 60 mg  Sodium Bicarb 650 mg  Vitamin D 31568 IU        Evening card:  Atorvastatin 80 mg  Carvedilol 12.5 mg  Hydroxychloroquine 200 mg  Nifedipine ER Osmotic 60 mg  Sodium Bicarb 650 mg        Doxazosin 2 mg not packed  Furosemide 40mg not packed-pt to take as needed  Pt prefers to get meds from [email or web address] and brings it to pharmacy.  Nifedipine ER fill at [facility] through [facility]   Irbesartan or hydralazine 100mg d/c

## 2018-11-09 NOTE — TELEPHONE ENCOUNTER
Coreg increased to 12.5mg BID (this change discussed with Cardiology)  Vit D2 started (as directed by Nephrology)    These scripts sent to [facility] pharmacy.    Pharmacy alerted and will get pill packing today with these changes.    Thanks,  WENDY

## 2018-11-19 ENCOUNTER — OUTPATIENT CASE MANAGEMENT (OUTPATIENT)
Dept: ADMINISTRATIVE | Facility: OTHER | Age: 73
End: 2018-11-19

## 2018-11-19 NOTE — PROGRESS NOTES
Summary  Chart reviewed in epic.  Pt is doing well.  Wt continues to fluciate but pt is following direction and advise    Intervention  Reviewed blood pressure log.  Range 148//72 had two that were 170's/80  Patient/caregiver will verbalize 2 food items Low Sodium within 2 weeks.  Pt has inproved understanding of high sodium foods to avoid.  Pt went to nutrition consult  Pt verbalized that fresh or most frozen vegetables are good choice as well as grilled fish or chicken prepared at home with herbs or seasoning  Pt states her appetite is improving and inquired how she will know if any wt gain is fluid or wt  Advised that fluid will also show in leg swelling and stomach bloating and will respond to diuretics.  She will also have other symptoms such as sob etc that will indicate it is not due to better nutrition.  Advised pt to discuss any concerns with the pcp and they will guide her.        Plan:  Refer to chronic care program  Review nutrition and supplements to increase albumin nepro  Review low sodium diet  Folow up on receipt of otc catalog  Review signs of lupus      Todays OPCM Self-Management Care Plan was developed with the patients/caregivers input and was based on identified barriers from todays assessment.  Goals were written today with the patient/caregiver and the patient has agreed to work towards these goals to improve his/her overall well-being. Patient verbalized understanding of the care plan, goals, and all of today's instructions. Encouraged patient/caregiver to communicate with his/her physician and health care team about health conditions and the treatment plan.  Provided my contact information today and encouraged patient/caregiver to call me with any questions as needed.

## 2018-11-30 ENCOUNTER — PATIENT MESSAGE (OUTPATIENT)
Dept: PRIMARY CARE CLINIC | Facility: CLINIC | Age: 73
End: 2018-11-30

## 2018-11-30 ENCOUNTER — PATIENT MESSAGE (OUTPATIENT)
Dept: NEUROLOGY | Facility: CLINIC | Age: 73
End: 2018-11-30

## 2018-11-30 DIAGNOSIS — I25.84 CORONARY ARTERY DISEASE DUE TO CALCIFIED CORONARY LESION: Primary | ICD-10-CM

## 2018-11-30 DIAGNOSIS — M32.9 LUPUS (SYSTEMIC LUPUS ERYTHEMATOSUS): Chronic | ICD-10-CM

## 2018-11-30 DIAGNOSIS — I50.31 ACUTE HEART FAILURE WITH NORMAL EJECTION FRACTION: ICD-10-CM

## 2018-11-30 DIAGNOSIS — I25.10 CORONARY ARTERY DISEASE DUE TO CALCIFIED CORONARY LESION: Primary | ICD-10-CM

## 2018-11-30 RX ORDER — HYDROXYCHLOROQUINE SULFATE 200 MG/1
TABLET, FILM COATED ORAL
Qty: 180 TABLET | Refills: 1 | Status: SHIPPED | OUTPATIENT
Start: 2018-11-30 | End: 2019-02-13

## 2018-11-30 RX ORDER — ATORVASTATIN CALCIUM 80 MG/1
80 TABLET, FILM COATED ORAL DAILY
Qty: 90 TABLET | Refills: 3 | Status: SHIPPED | OUTPATIENT
Start: 2018-11-30 | End: 2018-12-11 | Stop reason: SDUPTHER

## 2018-11-30 NOTE — TELEPHONE ENCOUNTER
Dr Ramirez- Ms Arnold is requesting a refill of her hydroxychloroquine. Could you assist?     Albertina- Refill of atorvastatin sent to [facility] pharmacy. Please let patient know that I've reached out to Dr Ramirez and his staff about her hydroxychloroquine refill. She also needs an appt with Dr Rodríguez (her Neurologist). She continues to have vision abnormalities.    Thanks,  KJ

## 2018-12-04 ENCOUNTER — PATIENT MESSAGE (OUTPATIENT)
Dept: NEUROLOGY | Facility: CLINIC | Age: 73
End: 2018-12-04

## 2018-12-10 ENCOUNTER — OUTPATIENT CASE MANAGEMENT (OUTPATIENT)
Dept: ADMINISTRATIVE | Facility: OTHER | Age: 73
End: 2018-12-10

## 2018-12-10 ENCOUNTER — TELEPHONE (OUTPATIENT)
Dept: NEUROLOGY | Facility: CLINIC | Age: 73
End: 2018-12-10

## 2018-12-10 NOTE — PROGRESS NOTES
Summary  Chart reviewed in epic.  Pt is not eligible for chronic care program but she is enrolled in palliative care and saw the nurse practitioner this week.  They have adjusted her dry wt from 107 to 110.  Pt verbalizes understanding.  Pt is taking her blood pressures daily as well.    Intervention  Reviewed [employer] and gave her the number for otc pharmacy and advised to call and place order.  They will be able to tell her the amount she can order.  Reviewed several items that will be on the list and she states she can use tylenol, band aids etc.  Advised that all those items are on the list  Pt care has transitioned to palliative.   Appointment scheduled with neuropsych for cognitive testing on feb 7 th @ 830 am  6 th floor clinic tower.  Pt verbalized understanding and will also receive a reminder in the mail.    Reviewed symptoms of lupus and pt verbalized skin rash and unexplained fever as symptoms   Reviewed wt trends pt states pt is 111 she denies any swelling to legs but will take the extra lasix as instructed.    Reviewed upcoming appointments with pt and she documented them on her calender.  Reviewed short and long term goals with pt.  Established that all were met and case closure today.  Pt verbalized understanding.  She states no further needs at this time.  Pt was advised that she could contact if any new needs arise and she verbalized understanding.

## 2018-12-10 NOTE — TELEPHONE ENCOUNTER
----- Message from Sarah Leahy sent at 12/10/2018  8:51 AM CST -----  Contact: pt @ [phone]  Calling to speak with someone in Dr. James's office regarding her MRI results, asking to speak with the doctor. Please call.

## 2018-12-11 ENCOUNTER — TELEPHONE (OUTPATIENT)
Dept: INTERNAL MEDICINE | Facility: CLINIC | Age: 73
End: 2018-12-11

## 2018-12-11 DIAGNOSIS — I25.84 CORONARY ARTERY DISEASE DUE TO CALCIFIED CORONARY LESION: ICD-10-CM

## 2018-12-11 DIAGNOSIS — I25.10 CORONARY ARTERY DISEASE DUE TO CALCIFIED CORONARY LESION: ICD-10-CM

## 2018-12-11 DIAGNOSIS — I50.31 ACUTE HEART FAILURE WITH NORMAL EJECTION FRACTION: ICD-10-CM

## 2018-12-11 RX ORDER — SODIUM BICARBONATE 650 MG/1
650 TABLET ORAL 2 TIMES DAILY
Qty: 180 TABLET | Refills: 3 | Status: SHIPPED | OUTPATIENT
Start: 2018-12-11 | End: 2019-06-24

## 2018-12-11 RX ORDER — NIFEDIPINE 60 MG/1
60 TABLET, EXTENDED RELEASE ORAL 2 TIMES DAILY
Qty: 180 TABLET | Refills: 3 | Status: SHIPPED | OUTPATIENT
Start: 2018-12-11 | End: 2019-12-30

## 2018-12-11 RX ORDER — ISOSORBIDE MONONITRATE 60 MG/1
60 TABLET, EXTENDED RELEASE ORAL DAILY
Qty: 90 TABLET | Refills: 3 | Status: SHIPPED | OUTPATIENT
Start: 2018-12-11 | End: 2019-06-04

## 2018-12-11 RX ORDER — ATORVASTATIN CALCIUM 80 MG/1
80 TABLET, FILM COATED ORAL DAILY
Qty: 90 TABLET | Refills: 3 | Status: SHIPPED | OUTPATIENT
Start: 2018-12-11 | End: 2019-06-24

## 2018-12-11 NOTE — PROGRESS NOTES
Adherence packed for 30 days using [employer] DOS monthly packs:     Morning card:  Asprin 81 mg  Carvedlol 12.5 mg  Citalopram 20 mg (1&1/2 tabs)  Famotidine 20 mg  Hydroxychloroquine 200 mg  Isosorbide Mononitrate ER 60 mg  Levothyroxine 75 mcg  Nifedipine ER Osmotic 60 mg  Sodium Bicarb 650 mg  Vitamin D 91877 IU        Evening card:  Atorvastatin 80 mg  Carvedilol 12.5 mg  Hydroxychloroquine 200 mg  Nifedipine ER Osmotic 60 mg  Sodium Bicarb 650 mg

## 2018-12-11 NOTE — TELEPHONE ENCOUNTER
Med refills sent to [facility], please alert patient.    Thanks,  KJ    ----- Message from Tatyana Garsia PharmD sent at 12/11/2018 12:11 PM CST -----  Regarding: refills for [employer]  Contact: [phone]  Patient is requesting refills to be sent to [facility] mail order: Isosorbide Mono, Nifedipine, Sodium Bicarb and Atorvastatin. She will bring me the meds to re-pack.    Thanks!

## 2018-12-18 ENCOUNTER — TELEPHONE (OUTPATIENT)
Dept: INTERNAL MEDICINE | Facility: CLINIC | Age: 73
End: 2018-12-18

## 2018-12-18 ENCOUNTER — OFFICE VISIT (OUTPATIENT)
Dept: PRIMARY CARE CLINIC | Facility: CLINIC | Age: 73
End: 2018-12-18
Payer: MEDICARE

## 2018-12-18 VITALS
WEIGHT: 113.31 LBS | DIASTOLIC BLOOD PRESSURE: 70 MMHG | HEART RATE: 60 BPM | BODY MASS INDEX: 19.35 KG/M2 | SYSTOLIC BLOOD PRESSURE: 138 MMHG | HEIGHT: 64 IN | OXYGEN SATURATION: 98 %

## 2018-12-18 DIAGNOSIS — M89.9 DISORDER OF BONE: ICD-10-CM

## 2018-12-18 DIAGNOSIS — D63.1 ANEMIA OF CHRONIC RENAL FAILURE, STAGE 4 (SEVERE): ICD-10-CM

## 2018-12-18 DIAGNOSIS — E55.9 HYPOVITAMINOSIS D: ICD-10-CM

## 2018-12-18 DIAGNOSIS — I1A.0 RESISTANT HYPERTENSION: ICD-10-CM

## 2018-12-18 DIAGNOSIS — D51.9 ANEMIA DUE TO VITAMIN B12 DEFICIENCY, UNSPECIFIED B12 DEFICIENCY TYPE: ICD-10-CM

## 2018-12-18 DIAGNOSIS — H53.9 VISION CHANGES: ICD-10-CM

## 2018-12-18 DIAGNOSIS — N18.4 CKD (CHRONIC KIDNEY DISEASE), STAGE IV: ICD-10-CM

## 2018-12-18 DIAGNOSIS — Z79.899 POLYPHARMACY: ICD-10-CM

## 2018-12-18 DIAGNOSIS — N18.4 ANEMIA OF CHRONIC RENAL FAILURE, STAGE 4 (SEVERE): ICD-10-CM

## 2018-12-18 DIAGNOSIS — M85.80 OSTEOPENIA, UNSPECIFIED LOCATION: Chronic | ICD-10-CM

## 2018-12-18 DIAGNOSIS — M54.32 CHRONIC SCIATICA OF LEFT SIDE: ICD-10-CM

## 2018-12-18 PROCEDURE — 99215 OFFICE O/P EST HI 40 MIN: CPT | Mod: HCNC,S$GLB,, | Performed by: INTERNAL MEDICINE

## 2018-12-18 PROCEDURE — 3075F SYST BP GE 130 - 139MM HG: CPT | Mod: CPTII,HCNC,S$GLB, | Performed by: INTERNAL MEDICINE

## 2018-12-18 PROCEDURE — 1101F PT FALLS ASSESS-DOCD LE1/YR: CPT | Mod: CPTII,HCNC,S$GLB, | Performed by: INTERNAL MEDICINE

## 2018-12-18 PROCEDURE — 3078F DIAST BP <80 MM HG: CPT | Mod: CPTII,HCNC,S$GLB, | Performed by: INTERNAL MEDICINE

## 2018-12-18 RX ORDER — ACETAMINOPHEN 325 MG/1
325 TABLET ORAL
Status: COMPLETED | OUTPATIENT
Start: 2018-12-18 | End: 2018-12-18

## 2018-12-18 RX ADMIN — ACETAMINOPHEN 325 MG: 325 TABLET ORAL at 09:12

## 2018-12-18 NOTE — ASSESSMENT & PLAN NOTE
Hallucinations, not ocular in origin (seen by ophthalmology), completed steroids for lupus flair  · Followed by Vascular Neurology  · Plan for neuropsych eval

## 2018-12-18 NOTE — ASSESSMENT & PLAN NOTE
Newly compliant with BP and CHF meds  · BP high-normal  · No med changes  · Continue lasix 40mg BID  · Coreg increased at previous appt to 12.5mg BID   · Continue isosorbide mononitrate 60mg, nifedipine 60mg BID

## 2018-12-18 NOTE — ASSESSMENT & PLAN NOTE
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

## 2018-12-18 NOTE — TELEPHONE ENCOUNTER
Please let patient know that Dr Epperson's staff is reaching out.    Thanks,  KJ    ----- Message from Abbie Clemente LPN sent at 12/18/2018 10:58 AM CST -----  I will reach out to pt and get scheduled. Thanks      ----- Message -----  From: Amarilys Johns MD  Sent: 12/18/2018   9:17 AM  To: Zhou TERRAZAS Staff    Hi Dr Epperson and staff,  Can you reach out to this patient for appointment? She is due now.    She's doing great! I'm getting labs today.    Thanks,  WENDY

## 2018-12-18 NOTE — Clinical Note
Hi Dr Epperson and staff,Can you reach out to this patient for appointment? She is due now.She's doing great! I'm getting labs today.Thanks,KJ

## 2018-12-18 NOTE — PATIENT INSTRUCTIONS
TODAY:  - labs today  - DEXA scan in 2019  - start your work-out program at [employer]   - PCP messaged Dr Epperson for appt    NEXT:  - labs

## 2018-12-18 NOTE — ASSESSMENT & PLAN NOTE
R lower back pain  · Will order pain management referral at next appt  · christellen interested in TALIA

## 2018-12-18 NOTE — ASSESSMENT & PLAN NOTE
"2014 DXA Fem neck T - 2.1, L spine - 0.7. 2016 DXA "Decrease in hip (-9.2%) and lumbar spine (-3.7%) since prior study.  FRAX calculations do not suggest treatment."  · DEXA due in 1/2019  "

## 2018-12-18 NOTE — PROGRESS NOTES
"Primary Care Provider Appointment    Subjective:      Patient ID: Ewelina Arnold is a 72 y.o. female with CHF, MDD, HTN, HLD, lupus    Chief Complaint: Hypertension and Follow-up    Patient's CHF is well-controlled. She is participating in digital weight monitoring program through [employer]. She reports her dry weight increased to 110# because she is eating more. Her weights are in this range (self-entered into Epic). Her lasix was continued at 40mg BID dosing. She is due for labs. She reports understanding her disease process more now. Sees Dr Burdick with CHF clinic in 1/2019. Her HH has ended. Home BPs elevated, but patient is clinically stable. She completed Cardiac Rehab at [facility], wants to continue at [employer] or restart in the future.    Has CKD. Last seen by Dr Epperson on 10/26, is due for follow-up now.     She was seen by Dr James (Neuro), who referred her to Neuropsych testing in 2/2019. She continues to have vision changes but she states "it's not my eyes." She was evaluated by optho and Rheum with no acute findings regarding eye abnormalities or lupus exacerbation respectively.    She is participating in pill packing, and is compliant with all meds as follows:  Adherence packed for 30 days using [employer] monthly packs:     Morning card:  Asprin 81 mg  Carvedlol 12.5 mg  Citalopram 20 mg (1&1/2 tabs)  Famotidine 20 mg  Hydroxychloroquine 200 mg  Isosorbide Mononitrate ER 60 mg  Levothyroxine 75 mcg  Nifedipine ER Osmotic 60 mg  Sodium Bicarb 650 mg  Vitamin D 73897 IU        Evening card:  Atorvastatin 80 mg  Carvedilol 12.5 mg  Hydroxychloroquine 200 mg  Nifedipine ER Osmotic 60 mg  Sodium Bicarb 650 mg             Electronically signed by Amanda GandaraD at 12/10/2018  6:15 PM       Past Surgical History:   Procedure Laterality Date    BIOPSY-BONE MARROW Left 10/16/2017    [facility]    CARDIAC CATHETERIZATION  07/27/2011    x1    CHOLECYSTECTOMY  1999 "    COLON SURGERY  2000 & 2003    partial removal    COLONOSCOPY N/A 4/14/2016    Procedure: COLONOSCOPY;  Surgeon: Jose Steen MD;  Location: [facility] (4TH FLR);  Service: Endoscopy;  Laterality: N/A;  prep 2 days prior light meals only/clear liquid day before  and 4 dulcolax tabs (5 mgs) at noon    COLONOSCOPY N/A 9/14/2017    Procedure: COLONOSCOPY;  Surgeon: Jose Steen MD;  Location: [facility] (4TH FLR);  Service: Endoscopy;  Laterality: N/A;    COLONOSCOPY N/A 9/14/2017    Performed by [facility] at [facility] ENDO (4TH FLR)    COLONOSCOPY N/A 4/14/2016    Performed by Jose Steen MD at [facility] (4TH FLR)    COLONOSCOPY N/A 4/23/2013    Performed by Jose Steen MD at [facility] (4TH FLR)    TALIA-TRANSFORAMINAL Left 11/3/2016    [facility]    ESOPHAGOGASTRODUODENOSCOPY (EGD) N/A 3/16/2017    [facility]    EYE SURGERY      HAND SURGERY Bilateral 1996 & 1997    due to carpal tunnel     HERNIA REPAIR      HYSTERECTOMY  1983    INJECTION-STEROID-EPIDURAL-TRANSFORAMINAL Left 1/7/2016    [facility]    Left heart cath Left 12/20/2017    Performed by Chandan Ly [facility] at [facility] CATH LAB    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    OOPHORECTOMY      removed one    Peripheral Iridotomy both eyes  1994    laser angle correction    THYROIDECTOMY, PARTIAL  2006    to remove two nodules and one-half thyroid       Past Medical History:   Diagnosis Date    Acute hypoxemic respiratory failure 4/28/2018    Acute on chronic diastolic congestive heart failure 11/17/2017    Allergy     Anatomical narrow angle glaucoma     Anemia     States diagnosed about a month ago    Aortic atherosclerosis     Arthritis     Cataract     CHF (congestive heart failure)     Chronic kidney disease     Chronic sciatica of left side     Right lower back pain due to sciatica     "Coronary artery disease     Depression     Dry eyes     GERD (gastroesophageal reflux disease)     History of colon cancer     Hyperaldosteronism     Hyperlipidemia     Hypertension     Hypothyroidism     Keloid cicatrix     Left ventricular diastolic dysfunction with preserved systolic function     Lupus (systemic lupus erythematosus) 8/17/2012    Malnutrition 5/16/2017    Osteoarthritis of cervical spine 8/17/2012    Osteopenia     PAD (peripheral artery disease)     FRAN (renal artery stenosis)        Review of Systems   Constitutional: Positive for activity change. Negative for appetite change.   Respiratory: Negative for cough and shortness of breath.    Cardiovascular: Negative for leg swelling.   Genitourinary: Negative for difficulty urinating and dysuria.   Skin: Positive for wound.   Neurological: Negative for dizziness and light-headedness.   Hematological: Bruises/bleeds easily.   Psychiatric/Behavioral: Negative for agitation, behavioral problems, confusion, decreased concentration and dysphoric mood.       Objective:   /70 (BP Location: Left arm, Patient Position: Sitting, BP Method: Medium (Manual))   Pulse 60   Ht 5' 4" (1.626 m)   Wt 51.4 kg (113 lb 5.1 oz)   LMP  (LMP Unknown) Comment: 99  SpO2 98%   BMI 19.45 kg/m²     Physical Exam   Constitutional: She is oriented to person, place, and time. She appears well-developed and well-nourished.   HENT:   Head: Normocephalic and atraumatic.   Neck: Normal range of motion.   Pulmonary/Chest: Effort normal.   Decreased breath sounds   Musculoskeletal: She exhibits deformity. She exhibits no edema.   Resolution of LLE nodule   Neurological: She is alert and oriented to person, place, and time.   Psychiatric: She has a normal mood and affect. Her behavior is normal. Thought content normal.   Vitals reviewed.      Lab Results   Component Value Date    WBC 3.07 (L) 10/17/2018    HGB 8.0 (L) 10/17/2018    HCT 25.9 (L) 10/17/2018    " " 10/17/2018    CHOL 181 08/28/2018    TRIG 54 08/28/2018    HDL 66 08/28/2018    ALT 11 09/25/2018    AST 26 09/25/2018     11/09/2018    K 4.0 11/09/2018     11/09/2018    CREATININE 3.1 (H) 11/09/2018    BUN 46 (H) 11/09/2018    CO2 29 11/09/2018    TSH 0.954 09/05/2018    INR 1.0 09/05/2018    GLUF 79 12/02/2011    HGBA1C 5.2 09/25/2018         Assessment:   72 y.o. female with multiple co-morbid illnesses here to continue work-up of chronic issues notably  CHF, MDD, HTN, HLD, lupus     Plan:     Problem List Items Addressed This Visit        Ophtho    Vision changes     Hallucinations, not ocular in origin (seen by ophthalmology), completed steroids for lupus flair  · Followed by Vascular Neurology  · Plan for neuropsych eval            Cardiac/Vascular    Resistant hypertension     Newly compliant with BP and CHF meds  · BP high-normal  · No med changes  · Continue lasix 40mg BID  · Coreg increased at previous appt to 12.5mg BID   · Continue isosorbide mononitrate 60mg, nifedipine 60mg BID         Relevant Orders    Basic metabolic panel       Renal/    CKD (chronic kidney disease), stage IV     Kidney donation history, 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  · F/U  [facility]  · PCP messaged today for followup  · Monitor  · Avoid nephrotoxic meds         Relevant Orders    Basic metabolic panel    Vitamin B12       Oncology    Anemia of chronic renal failure, stage 4 (severe)     Followed by Nephrology, PRN iron infusions  · F/U Nephrology  · Iron infusion PRN  · May need erythropoetin         Relevant Orders    CBC auto differential    Vitamin B12       Endocrine    Hypovitaminosis D     Vit D low (level 21) completed Vit D2 monthly  · Continue daily vitamin D  · Monitor level         Relevant Orders    DXA Bone Density Spine And Hip       Orthopedic    Osteopenia (Chronic)     2014 DXA Fem neck T - 2.1, L spine - 0.7. 2016 DXA "Decrease in hip (-9.2%) and lumbar spine (-3.7%) since prior study.  FRAX calculations do not " "suggest treatment."  · DEXA due in 1/2019         Relevant Orders    DXA Bone Density Spine And Hip    Chronic sciatica of left side     R lower back pain  · Will order pain management referral at next appt  · patietn interested in TALIA         Relevant Medications    acetaminophen tablet 325 mg       Other    Polypharmacy     Complicated regimen with numerous cardiac and nephro meds, at risk for hospitalization due to med noncompliance  · Pill packing at pharmacy  · Close follow-up           Other Visit Diagnoses     Anemia due to vitamin B12 deficiency, unspecified B12 deficiency type         Relevant Orders    Vitamin B12    Disorder of bone         Relevant Orders    DXA Bone Density Spine And Hip          Health Maintenance       Date Due Completion Date    DEXA SCAN 01/05/2019 1/5/2016- TODAY    High Dose Statin 12/11/2019 12/11/2018    Mammogram 09/11/2020 9/11/2018    Colonoscopy 09/14/2022 9/14/2017    Lipid Panel 08/28/2023 8/28/2018    TETANUS VACCINE 06/29/2026 6/29/2016          Follow-up in about 6 weeks (around 1/29/2019). One hour spent with this patient today, half of that in counseling.    Amarilys Johns MD/MPH  Internal Medicine  [facility]  [phone]  "

## 2018-12-19 ENCOUNTER — TELEPHONE (OUTPATIENT)
Dept: INTERNAL MEDICINE | Facility: CLINIC | Age: 73
End: 2018-12-19

## 2018-12-19 NOTE — TELEPHONE ENCOUNTER
Please let patient know that her labs look great! Her kidney function is stable, B12 level is good. Her blood count is low but slowing improving.    Keep up the good work!    Thanks,  WENDY

## 2018-12-21 ENCOUNTER — TELEPHONE (OUTPATIENT)
Dept: OPHTHALMOLOGY | Facility: CLINIC | Age: 73
End: 2018-12-21

## 2018-12-21 NOTE — TELEPHONE ENCOUNTER
----- Message from Jenn Marinelli sent at 12/21/2018  8:47 AM CST -----  Contact: Ewelina  Patient Requesting Sooner Appointment.     Reason for sooner appt.:Pt  Would like discuss her current condition and get a recheck on her Rx for eyeglasses.   When is the first available appointment?March  Communication Preference:[phone]  Additional Information:

## 2019-01-02 DIAGNOSIS — F41.9 ANXIETY: Primary | ICD-10-CM

## 2019-01-02 RX ORDER — CITALOPRAM 20 MG/1
TABLET, FILM COATED ORAL
Qty: 135 TABLET | Refills: 1 | Status: SHIPPED | OUTPATIENT
Start: 2019-01-02 | End: 2019-01-03 | Stop reason: SDUPTHER

## 2019-01-02 RX ORDER — LATANOPROST 50 UG/ML
SOLUTION/ DROPS OPHTHALMIC
Qty: 3 BOTTLE | Refills: 3 | Status: SHIPPED | OUTPATIENT
Start: 2019-01-02 | End: 2020-01-02

## 2019-01-03 ENCOUNTER — TELEPHONE (OUTPATIENT)
Dept: INTERNAL MEDICINE | Facility: CLINIC | Age: 74
End: 2019-01-03

## 2019-01-03 DIAGNOSIS — F41.9 ANXIETY: ICD-10-CM

## 2019-01-03 RX ORDER — CITALOPRAM 20 MG/1
30 TABLET, FILM COATED ORAL DAILY
Qty: 135 TABLET | Refills: 3 | Status: SHIPPED | OUTPATIENT
Start: 2019-01-03 | End: 2020-01-02

## 2019-01-04 ENCOUNTER — OFFICE VISIT (OUTPATIENT)
Dept: NEPHROLOGY | Facility: CLINIC | Age: 74
End: 2019-01-04
Payer: MEDICARE

## 2019-01-04 VITALS
HEIGHT: 64 IN | BODY MASS INDEX: 19.39 KG/M2 | OXYGEN SATURATION: 96 % | HEART RATE: 69 BPM | WEIGHT: 113.56 LBS | SYSTOLIC BLOOD PRESSURE: 160 MMHG | DIASTOLIC BLOOD PRESSURE: 70 MMHG

## 2019-01-04 DIAGNOSIS — I15.2 HYPERTENSION DUE TO ENDOCRINE DISORDER: ICD-10-CM

## 2019-01-04 DIAGNOSIS — N18.4 CKD (CHRONIC KIDNEY DISEASE), STAGE IV: Primary | ICD-10-CM

## 2019-01-04 DIAGNOSIS — D63.1 ANEMIA OF CHRONIC RENAL FAILURE, STAGE 4 (SEVERE): ICD-10-CM

## 2019-01-04 DIAGNOSIS — N18.4 ANEMIA OF CHRONIC RENAL FAILURE, STAGE 4 (SEVERE): ICD-10-CM

## 2019-01-04 PROCEDURE — 3077F SYST BP >= 140 MM HG: CPT | Mod: CPTII,HCNC,S$GLB, | Performed by: INTERNAL MEDICINE

## 2019-01-04 PROCEDURE — 3078F DIAST BP <80 MM HG: CPT | Mod: CPTII,HCNC,S$GLB, | Performed by: INTERNAL MEDICINE

## 2019-01-04 PROCEDURE — 1101F PT FALLS ASSESS-DOCD LE1/YR: CPT | Mod: CPTII,HCNC,S$GLB, | Performed by: INTERNAL MEDICINE

## 2019-01-04 PROCEDURE — 99214 OFFICE O/P EST MOD 30 MIN: CPT | Mod: HCNC,S$GLB,, | Performed by: INTERNAL MEDICINE

## 2019-01-04 PROCEDURE — 3078F PR MOST RECENT DIASTOLIC BLOOD PRESSURE < 80 MM HG: ICD-10-PCS | Mod: CPTII,HCNC,S$GLB, | Performed by: INTERNAL MEDICINE

## 2019-01-04 PROCEDURE — 99499 UNLISTED E&M SERVICE: CPT | Mod: HCNC,S$GLB,, | Performed by: INTERNAL MEDICINE

## 2019-01-04 PROCEDURE — 3077F PR MOST RECENT SYSTOLIC BLOOD PRESSURE >= 140 MM HG: ICD-10-PCS | Mod: CPTII,HCNC,S$GLB, | Performed by: INTERNAL MEDICINE

## 2019-01-04 PROCEDURE — 1101F PR PT FALLS ASSESS DOC 0-1 FALLS W/OUT INJ PAST YR: ICD-10-PCS | Mod: CPTII,HCNC,S$GLB, | Performed by: INTERNAL MEDICINE

## 2019-01-04 PROCEDURE — 99999 PR PBB SHADOW E&M-EST. PATIENT-LVL III: CPT | Mod: PBBFAC,HCNC,, | Performed by: INTERNAL MEDICINE

## 2019-01-04 PROCEDURE — 99499 RISK ADDL DX/OHS AUDIT: ICD-10-PCS | Mod: HCNC,S$GLB,, | Performed by: INTERNAL MEDICINE

## 2019-01-04 PROCEDURE — 99999 PR PBB SHADOW E&M-EST. PATIENT-LVL III: ICD-10-PCS | Mod: PBBFAC,HCNC,, | Performed by: INTERNAL MEDICINE

## 2019-01-04 PROCEDURE — 99214 PR OFFICE/OUTPT VISIT, EST, LEVL IV, 30-39 MIN: ICD-10-PCS | Mod: HCNC,S$GLB,, | Performed by: INTERNAL MEDICINE

## 2019-01-08 ENCOUNTER — TELEPHONE (OUTPATIENT)
Dept: INTERNAL MEDICINE | Facility: CLINIC | Age: 74
End: 2019-01-08

## 2019-01-08 ENCOUNTER — OFFICE VISIT (OUTPATIENT)
Dept: TRANSPLANT | Facility: CLINIC | Age: 74
End: 2019-01-08
Payer: MEDICARE

## 2019-01-08 ENCOUNTER — HOSPITAL ENCOUNTER (OUTPATIENT)
Dept: RADIOLOGY | Facility: CLINIC | Age: 74
Discharge: HOME | End: 2019-01-08
Attending: INTERNAL MEDICINE
Payer: MEDICARE

## 2019-01-08 VITALS
DIASTOLIC BLOOD PRESSURE: 81 MMHG | WEIGHT: 112.63 LBS | BODY MASS INDEX: 18.77 KG/M2 | SYSTOLIC BLOOD PRESSURE: 179 MMHG | HEIGHT: 65 IN | HEART RATE: 61 BPM

## 2019-01-08 DIAGNOSIS — I50.32 CHRONIC DIASTOLIC HEART FAILURE: ICD-10-CM

## 2019-01-08 DIAGNOSIS — M85.80 OSTEOPENIA, UNSPECIFIED LOCATION: Chronic | ICD-10-CM

## 2019-01-08 DIAGNOSIS — E55.9 HYPOVITAMINOSIS D: ICD-10-CM

## 2019-01-08 DIAGNOSIS — I15.0 RENOVASCULAR HYPERTENSION: ICD-10-CM

## 2019-01-08 DIAGNOSIS — M89.9 DISORDER OF BONE: ICD-10-CM

## 2019-01-08 DIAGNOSIS — I1A.0 RESISTANT HYPERTENSION: ICD-10-CM

## 2019-01-08 PROCEDURE — 3079F PR MOST RECENT DIASTOLIC BLOOD PRESSURE 80-89 MM HG: ICD-10-PCS | Mod: CPTII,HCNC,S$GLB, | Performed by: INTERNAL MEDICINE

## 2019-01-08 PROCEDURE — 3077F SYST BP >= 140 MM HG: CPT | Mod: CPTII,HCNC,S$GLB, | Performed by: INTERNAL MEDICINE

## 2019-01-08 PROCEDURE — 77080 DEXA BONE DENSITY SPINE HIP: ICD-10-PCS | Mod: 26,HCNC,, | Performed by: INTERNAL MEDICINE

## 2019-01-08 PROCEDURE — 1101F PT FALLS ASSESS-DOCD LE1/YR: CPT | Mod: CPTII,HCNC,S$GLB, | Performed by: INTERNAL MEDICINE

## 2019-01-08 PROCEDURE — 99999 PR PBB SHADOW E&M-EST. PATIENT-LVL IV: ICD-10-PCS | Mod: PBBFAC,HCNC,, | Performed by: INTERNAL MEDICINE

## 2019-01-08 PROCEDURE — 1101F PR PT FALLS ASSESS DOC 0-1 FALLS W/OUT INJ PAST YR: ICD-10-PCS | Mod: CPTII,HCNC,S$GLB, | Performed by: INTERNAL MEDICINE

## 2019-01-08 PROCEDURE — 77080 DXA BONE DENSITY AXIAL: CPT | Mod: 26,HCNC,, | Performed by: INTERNAL MEDICINE

## 2019-01-08 PROCEDURE — 77080 DXA BONE DENSITY AXIAL: CPT | Mod: TC,HCNC

## 2019-01-08 PROCEDURE — 99214 OFFICE O/P EST MOD 30 MIN: CPT | Mod: HCNC,S$GLB,, | Performed by: INTERNAL MEDICINE

## 2019-01-08 PROCEDURE — 99214 PR OFFICE/OUTPT VISIT, EST, LEVL IV, 30-39 MIN: ICD-10-PCS | Mod: HCNC,S$GLB,, | Performed by: INTERNAL MEDICINE

## 2019-01-08 PROCEDURE — 3077F PR MOST RECENT SYSTOLIC BLOOD PRESSURE >= 140 MM HG: ICD-10-PCS | Mod: CPTII,HCNC,S$GLB, | Performed by: INTERNAL MEDICINE

## 2019-01-08 PROCEDURE — 99999 PR PBB SHADOW E&M-EST. PATIENT-LVL IV: CPT | Mod: PBBFAC,HCNC,, | Performed by: INTERNAL MEDICINE

## 2019-01-08 PROCEDURE — 3079F DIAST BP 80-89 MM HG: CPT | Mod: CPTII,HCNC,S$GLB, | Performed by: INTERNAL MEDICINE

## 2019-01-08 RX ORDER — CARVEDILOL 25 MG/1
25 TABLET ORAL 2 TIMES DAILY WITH MEALS
Qty: 180 TABLET | Refills: 3 | Status: SHIPPED | OUTPATIENT
Start: 2019-01-08 | End: 2019-12-30

## 2019-01-08 NOTE — PROGRESS NOTES
Subjective:       Patient ID: Ewelina Arnold is a 73 y.o. Black or  female who presents for re-evaluation of progression of Chronic Kidney Disease    HPI    Ms. 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  Patient previously admitted for hypoxemic respiratory failure from acute decompensated heart failure, diuresed with subsequent hypotension, therefore diuretic regimen adjusted prior to discharge to SNF, then directed to use as indicated for dry weight of ~107kg.  Patient reports daily weights have been relatively stable since on current regimen, though suspects weight gain with increase appetite and oral intake, she denies shortness of breath.  She reports blood pressure improved and relatively stable since discharge as well.  She otherwise denies any fever, chest pain, shortness of breath, abdominal pain, dysuria/hematuria.     Review of Systems   Constitutional: Negative for appetite change, fatigue and fever.   Respiratory: Negative for chest tightness and shortness of breath.    Cardiovascular: Negative for chest pain and leg swelling.   Gastrointestinal: Negative for abdominal pain, constipation, diarrhea, nausea and vomiting.   Genitourinary: Negative for difficulty urinating, dysuria, flank pain, frequency, hematuria and urgency.   Musculoskeletal: Negative for arthralgias, joint swelling and myalgias.   Skin: Negative for rash and wound.   Neurological: Negative for dizziness, weakness and light-headedness.   All other systems reviewed and are negative.      Objective:      Physical Exam   Constitutional: She appears well-developed and well-nourished.   Cardiovascular: Normal rate, regular rhythm and normal heart sounds. Exam reveals no gallop and no friction rub.   No murmur  heard.  Pulmonary/Chest: Effort normal and breath sounds normal. No respiratory distress. She has no wheezes. She has no rales.   Abdominal: Soft. Bowel sounds are normal. There is no tenderness.   Musculoskeletal: She exhibits no edema.   Neurological: She is alert.   Skin: Skin is warm and dry. No rash noted. No erythema.   Psychiatric: She has a normal mood and affect.       Assessment:       1. CKD (chronic kidney disease), stage IV    2. Hypertension due to endocrine disorder    3. Solitary kidney    4. Anemia of chronic renal failure, stage 4 (severe)        Plan:     Ms. 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  Patient previously with acute kidney injury, likely due to cardiomyopathy and moderate volume depletion with diuresis, creatinine improved prior to and since discharge.  Will trend symptoms/creatinine closely, will phone review BP diary and daily weights.  Encouraged oral/fluid intake for now, suspect dry weight ~109-111lbs, further recommendations pending results of above, patient voiced understanding of above, agreeable to plan as noted.    - Anemia: Hgb below goal, given IV iron, will start erythropoetin therapy now that BP controlled  - Bone/mineral metabolism: patient with secondary hyperparathyroidism, PTH at goal for stage CKD, patient on ergocalciferol for VitD deficiency    Return to clinic in 8-12 weeks with renal/heme panel, iron/TIBC/ferritin, urinalysis/culture, urine protein/creatinine ratio prior to next visit

## 2019-01-08 NOTE — PROGRESS NOTES
"Subjective: class 2B?    Patient ID:  Ewelina Arnold is a 73 y.o. female who presents for follow-up of Congestive Heart Failure      HPI Moderate to severe aortic stenosis (PABLITO 1.38 / mean gradient 41 / peak velocity 3.83 sept 2018) who seems better after Oct 2018 for fluid retention She was discharged to SNF followed by  Completion of Cardiac Rehab at [facility]. Of niote she  wants to continue at [employer] or restart in the future.   Current issue has annita difficult to control BP (home range 130 to 203 / 63 to 91)  No edema with stable clinic weight of 51 kg (home weight around 110 lbs).  Able to all ADL's. Can walk one to two blocks    Review of Systems   Constitution: Negative for decreased appetite, weight gain and weight loss.   Cardiovascular: Negative for chest pain, dyspnea on exertion, leg swelling, near-syncope, orthopnea and palpitations.   Respiratory: Negative for cough and shortness of breath.    Musculoskeletal: Negative for myalgias.   Gastrointestinal: Negative for jaundice.        Objective:    Physical Exam   Constitutional: She appears well-developed and well-nourished. No distress.   BP (!) 179/81 (BP Location: Right arm, Patient Position: Sitting, BP Method: Small (Automatic))   Pulse 61   Ht 5' 4.5" (1.638 m)   Wt 51.1 kg (112 lb 10.5 oz)   LMP  (LMP Unknown) Comment: 99  BMI 19.04 kg/m²      HENT:   Head: Normocephalic and atraumatic. Head is without abrasion and without contusion.   Right Ear: External ear normal.   Left Ear: External ear normal.   Nose: Nose normal. No epistaxis.   Mouth/Throat: Oropharynx is clear and moist. Mucous membranes are not cyanotic.   Eyes: Conjunctivae and EOM are normal. Pupils are equal, round, and reactive to light.   Neck: Normal range of motion. Neck supple. No tracheal deviation present.   Cardiovascular: Normal rate, regular rhythm and normal pulses. Exam reveals no gallop.   Murmur heard.   Harsh midsystolic murmur is present with a grade of 3/6 at " the upper right sternal border radiating to the neck. Consistent with moderate AS  Pulmonary/Chest: Effort normal and breath sounds normal. No stridor. No respiratory distress. She has no wheezes.   Abdominal: Soft. Normal appearance, normal aorta and bowel sounds are normal. She exhibits no distension. There is no tenderness.   Musculoskeletal: She exhibits no edema or tenderness.   Neurological: She is alert. She has normal strength and normal reflexes. She exhibits normal muscle tone.   Skin: Skin is warm. No rash noted. No erythema.   Psychiatric: She has a normal mood and affect. Her speech is normal and behavior is normal. Judgment and thought content normal. Cognition and memory are normal.         Assessment:       1. Resistant hypertension    2. Renovascular hypertension    3. Chronic diastolic heart failure         Plan:       Renovascular hypertension  BP has been controlled on coreg 25 BID   Will increase to this today  Additionally will try to find out why he is no longer in digital HTN    Chronic diastolic heart failure  Will get baseline six minute walk     Follow-up in about 3 months (around 4/8/2019). with echo

## 2019-01-08 NOTE — TELEPHONE ENCOUNTER
Manfred/Seth- please pack the coreg 25mg BID in patient's next pill pack. It was increased by Cardiology.    Thanks,  WENDY

## 2019-01-08 NOTE — ASSESSMENT & PLAN NOTE
BP has been controlled on coreg 25 BID   Will increase to this today  Additionally will try to find out why he is no longer in digital HTN

## 2019-01-09 ENCOUNTER — TELEPHONE (OUTPATIENT)
Dept: INTERNAL MEDICINE | Facility: CLINIC | Age: 74
End: 2019-01-09

## 2019-01-09 NOTE — PROGRESS NOTES
Adherence packed for 30 days using Arbovax DOS monthly packs:     Morning card:  Asprin 81 mg  Carvedlol 12.5 mg  Citalopram 20 mg (1&1/2 tabs)  Famotidine 20 mg  Hydroxychloroquine 200 mg  Isosorbide Mononitrate ER 60 mg  Levothyroxine 75 mcg  Nifedipine ER Osmotic 60 mg  Sodium Bicarb 650 mg  Vitamin D 10093 IU        Evening card:  Atorvastatin 80 mg  Carvedilol 12.5 mg  Hydroxychloroquine 200 mg  Nifedipine ER Osmotic 60 mg  Sodium Bicarb 650 mg

## 2019-01-09 NOTE — PROGRESS NOTES
Adherence packed for 30 days using [email or web address] monthly packs:     Morning card:  Asprin 81 mg  Carvedlol 25 mg  Citalopram 20 mg (1&1/2 tabs)  Famotidine 20 mg  Hydroxychloroquine 200 mg  Isosorbide Mononitrate ER 60 mg  Levothyroxine 75 mcg  Nifedipine ER Osmotic 60 mg  Sodium Bicarb 650 mg  Vitamin D 39559 IU        Evening card:  Atorvastatin 80 mg  Carvedilol 25 mg  Hydroxychloroquine 200 mg  Nifedipine ER Osmotic 60 mg  Sodium Bicarb 650 mg    Changed Coreg to 25 mg

## 2019-01-10 ENCOUNTER — OFFICE VISIT (OUTPATIENT)
Dept: OPTOMETRY | Facility: CLINIC | Age: 74
End: 2019-01-10
Payer: COMMERCIAL

## 2019-01-10 ENCOUNTER — HOSPITAL ENCOUNTER (OUTPATIENT)
Dept: PULMONOLOGY | Facility: CLINIC | Age: 74
Discharge: HOME | End: 2019-01-10
Payer: MEDICARE

## 2019-01-10 VITALS — WEIGHT: 110 LBS | HEIGHT: 64 IN | BODY MASS INDEX: 18.78 KG/M2

## 2019-01-10 DIAGNOSIS — H52.223 HYPEROPIA OF BOTH EYES WITH REGULAR ASTIGMATISM AND PRESBYOPIA: Primary | ICD-10-CM

## 2019-01-10 DIAGNOSIS — H52.4 HYPEROPIA OF BOTH EYES WITH REGULAR ASTIGMATISM AND PRESBYOPIA: Primary | ICD-10-CM

## 2019-01-10 DIAGNOSIS — H52.03 HYPEROPIA OF BOTH EYES WITH REGULAR ASTIGMATISM AND PRESBYOPIA: Primary | ICD-10-CM

## 2019-01-10 DIAGNOSIS — I1A.0 RESISTANT HYPERTENSION: ICD-10-CM

## 2019-01-10 PROCEDURE — 94618 PULMONARY STRESS TESTING: ICD-10-PCS | Mod: HCNC,S$GLB,, | Performed by: INTERNAL MEDICINE

## 2019-01-10 PROCEDURE — 99999 PR PBB SHADOW E&M-EST. PATIENT-LVL II: CPT | Mod: PBBFAC,,, | Performed by: OPTOMETRIST

## 2019-01-10 PROCEDURE — 92014 PR EYE EXAM, EST PATIENT,COMPREHESV: ICD-10-PCS | Mod: S$GLB,,, | Performed by: OPTOMETRIST

## 2019-01-10 PROCEDURE — 99999 PR PBB SHADOW E&M-EST. PATIENT-LVL II: ICD-10-PCS | Mod: PBBFAC,,, | Performed by: OPTOMETRIST

## 2019-01-10 PROCEDURE — 92015 PR REFRACTION: ICD-10-PCS | Mod: S$GLB,,, | Performed by: OPTOMETRIST

## 2019-01-10 PROCEDURE — 92014 COMPRE OPH EXAM EST PT 1/>: CPT | Mod: S$GLB,,, | Performed by: OPTOMETRIST

## 2019-01-10 PROCEDURE — 94618 PULMONARY STRESS TESTING: CPT | Mod: HCNC,S$GLB,, | Performed by: INTERNAL MEDICINE

## 2019-01-10 PROCEDURE — 92015 DETERMINE REFRACTIVE STATE: CPT | Mod: S$GLB,,, | Performed by: OPTOMETRIST

## 2019-01-10 NOTE — PROGRESS NOTES
HPI     [facility] vision exam.  Dr. Mccray [facility] PT  Pt states that she has been having blurry peripheral va for about 5-6   months.     Denies flashes/pain/irritation OU.     Latanoprost OU QHS. Last yesterday PM     Last edited by Grant Dawn, OD on 1/10/2019  9:38 AM. (History)            Assessment /Plan     For exam results, see Encounter Report.    Hyperopia of both eyes with regular astigmatism and presbyopia  -[facility] Vision Exam  Eyeglass Final Rx     Eyeglass Final Rx       Sphere Cylinder Axis Dist VA Add    Right +2.75 +0.50 025 20/25 +2.50    Left +3.25 Sphere  20/25 +2.50    Type:  Trifocal    Expiration Date:  1/11/2020                  RTC as directed OMD

## 2019-01-11 ENCOUNTER — PATIENT MESSAGE (OUTPATIENT)
Dept: PRIMARY CARE CLINIC | Facility: CLINIC | Age: 74
End: 2019-01-11

## 2019-01-11 ENCOUNTER — TELEPHONE (OUTPATIENT)
Dept: NEPHROLOGY | Facility: CLINIC | Age: 74
End: 2019-01-11

## 2019-01-11 ENCOUNTER — PATIENT MESSAGE (OUTPATIENT)
Dept: NEPHROLOGY | Facility: CLINIC | Age: 74
End: 2019-01-11

## 2019-01-11 ENCOUNTER — TELEPHONE (OUTPATIENT)
Dept: INTERNAL MEDICINE | Facility: CLINIC | Age: 74
End: 2019-01-11

## 2019-01-11 DIAGNOSIS — N18.4 ANEMIA IN STAGE 4 CHRONIC KIDNEY DISEASE: Primary | ICD-10-CM

## 2019-01-11 DIAGNOSIS — D63.1 ANEMIA IN STAGE 4 CHRONIC KIDNEY DISEASE: Primary | ICD-10-CM

## 2019-01-11 NOTE — PROCEDURES
Ewelina Arnold is a 73 y.o.  female patient, who presents for a 6 minute walk test ordered by Yeimi Burdick MD.  The diagnosis is Shortness of Breath; Cardiomyopathy; Aortic Valve Disorder.  The patient's BMI is 18.9 kg/m2.  Predicted distance (lower limit of normal) is 334.47 meters.      Test Results:    The test was completed with stops due to shortness of breath.  The patient stopped 1 time for a total of 32 seconds.  The total time walked was 328 seconds.  During walking, the patient reported:  Dyspnea.  The patient used no assistive devices during testing.     01/10/2019---------Distance: 259.08 meters (850 feet)     O2 Sat % Supplemental Oxygen Heart Rate Blood Pressure Dian Scale   Pre-exercise  (Resting) 99 % Room Air 61 bpm 188/81 mmHg 0   During Exercise 98 % Room Air 65 bpm 192/82 mmHg 3   Post-exercise  (Recovery) 98 % Room Air  62 bpm       Recovery Time:  79 seconds    Performing nurse/tech:  Regina HIGH      PREVIOUS STUDY:   The patient has not had a previous study.      CLINICAL INTERPRETATION:  Six minute walk distance is 259.08 meters (850 feet) with moderate dyspnea.  During exercise, there was no significant desaturation while breathing room air.  Both blood pressure and heart rate remained stable with walking.  Hypertension was present prior to exercise.  The patient did not report non-pulmonary symptoms during exercise.  Significant exercise impairment is likely due to cardiovascular causes and subjective symptoms.  The patient did complete the study, walking 328 seconds of the 360 second test.  No previous study performed.  Based upon age and body mass index, exercise capacity is less than predicted.

## 2019-01-11 NOTE — TELEPHONE ENCOUNTER
Spoke with pt to inform her about lab density results orders per Dr. Johns and possible future Rheumatology appt , pt verbalized understanding.

## 2019-01-11 NOTE — TELEPHONE ENCOUNTER
----- Message from Shana Ann sent at 1/11/2019 11:09 AM CST -----  Contact:   Pt   [phone]  Needs Advice    Reason for call:    Schedule an injection appt for low blood count , give the pt a call back to verify         Communication Preference:    Phone     Additional Information:    Message already sent to Dr. Epperson /waiting for response

## 2019-01-11 NOTE — TELEPHONE ENCOUNTER
----- Message from Amarilys Johns MD sent at 1/11/2019 11:01 AM CST -----  Please let patient know that her bone density scan showed worsening osteoporosis. She should continue vitamin D supplements to keep her bones strong. Exercise will help her maintain her bone strength, especially activities that are weight bearing (walking, jogging, weight lifting).    I am going to reach out to our Rheumatology Dept to see if she should receive prolia infusions to improve her bone density. I will let her know what they say.    WENDY Espinal

## 2019-01-11 NOTE — TELEPHONE ENCOUNTER
Dr Gallego,  Should this patient be started on prolia? Osteoporosis is worsening in the setting of CKD.    WENDY Espinal (PCP)

## 2019-01-14 ENCOUNTER — LAB VISIT (OUTPATIENT)
Dept: LAB | Facility: HOSPITAL | Age: 74
End: 2019-01-14
Attending: INTERNAL MEDICINE
Payer: MEDICARE

## 2019-01-14 DIAGNOSIS — D63.1 ANEMIA IN STAGE 4 CHRONIC KIDNEY DISEASE: ICD-10-CM

## 2019-01-14 DIAGNOSIS — N18.4 ANEMIA IN STAGE 4 CHRONIC KIDNEY DISEASE: ICD-10-CM

## 2019-01-14 LAB
FERRITIN SERPL-MCNC: 438 NG/ML
HCT VFR BLD AUTO: 29.9 %
HGB BLD-MCNC: 9.6 G/DL
IRON SERPL-MCNC: 63 UG/DL
SATURATED IRON: 24 %
TOTAL IRON BINDING CAPACITY: 260 UG/DL
TRANSFERRIN SERPL-MCNC: 176 MG/DL

## 2019-01-14 PROCEDURE — 83540 ASSAY OF IRON: CPT | Mod: HCNC

## 2019-01-14 PROCEDURE — 36415 COLL VENOUS BLD VENIPUNCTURE: CPT | Mod: HCNC

## 2019-01-14 PROCEDURE — 82728 ASSAY OF FERRITIN: CPT | Mod: HCNC

## 2019-01-14 PROCEDURE — 85014 HEMATOCRIT: CPT | Mod: HCNC

## 2019-01-14 PROCEDURE — 85018 HEMOGLOBIN: CPT | Mod: HCNC

## 2019-01-16 ENCOUNTER — INFUSION (OUTPATIENT)
Dept: INFECTIOUS DISEASES | Facility: HOSPITAL | Age: 74
End: 2019-01-16
Attending: INTERNAL MEDICINE
Payer: MEDICARE

## 2019-01-16 VITALS — DIASTOLIC BLOOD PRESSURE: 74 MMHG | SYSTOLIC BLOOD PRESSURE: 184 MMHG

## 2019-01-16 DIAGNOSIS — D63.1 ANEMIA OF CHRONIC RENAL FAILURE, STAGE 4 (SEVERE): Primary | ICD-10-CM

## 2019-01-16 DIAGNOSIS — N18.4 ANEMIA OF CHRONIC RENAL FAILURE, STAGE 4 (SEVERE): Primary | ICD-10-CM

## 2019-01-16 PROCEDURE — 63600175 PHARM REV CODE 636 W HCPCS: Mod: JG,HCNC | Performed by: INTERNAL MEDICINE

## 2019-01-16 PROCEDURE — 96372 THER/PROPH/DIAG INJ SC/IM: CPT | Mod: HCNC

## 2019-01-16 RX ADMIN — DARBEPOETIN ALFA 25 MCG: 25 INJECTION, SOLUTION INTRAVENOUS; SUBCUTANEOUS at 02:01

## 2019-01-16 NOTE — PROGRESS NOTES
Hgb 9.6;Hct 29.9    1st dose Aranesp 25 mcg given  PER PROTOCOL FORM DROD-188. B/P elevated Dr Epperson notified and ok'd injection.  2 week return appt set     Gfr 15/stage 4

## 2019-01-17 ENCOUNTER — PATIENT MESSAGE (OUTPATIENT)
Dept: PRIMARY CARE CLINIC | Facility: CLINIC | Age: 74
End: 2019-01-17

## 2019-01-18 ENCOUNTER — TELEPHONE (OUTPATIENT)
Dept: INTERNAL MEDICINE | Facility: CLINIC | Age: 74
End: 2019-01-18

## 2019-01-18 DIAGNOSIS — N18.4 CKD (CHRONIC KIDNEY DISEASE), STAGE IV: ICD-10-CM

## 2019-01-18 DIAGNOSIS — E03.9 ACQUIRED HYPOTHYROIDISM: ICD-10-CM

## 2019-01-18 DIAGNOSIS — I50.22 CHRONIC SYSTOLIC CONGESTIVE HEART FAILURE: Primary | ICD-10-CM

## 2019-01-18 DIAGNOSIS — E44.0 MALNUTRITION OF MODERATE DEGREE: ICD-10-CM

## 2019-01-23 ENCOUNTER — TELEPHONE (OUTPATIENT)
Dept: INTERNAL MEDICINE | Facility: CLINIC | Age: 74
End: 2019-01-23

## 2019-01-23 NOTE — TELEPHONE ENCOUNTER
Patient has continued to check BP multiple times per day. Her current BP is 144/62. BP in the last week has ranged from around 138-148/62-70. She had a reading of 188/122 on Sunday morning and then took doxazosin. BP when down to 155/75 by the evening.

## 2019-01-23 NOTE — TELEPHONE ENCOUNTER
Please check on patient's BP. Is she taking the doxazosin regularly?    Thanks,  KJ    ----- Message from Speedy Mahajan sent at 1/17/2019  3:41 PM CST -----  Called patient. Patient has been recording BP since 1/14. Ranging from 140s-160s/70-80s. Had an isolated reading of 184/74 before just before receiving aranesp infusion yesterday. Alsoad leg swelling yesterday but took diuretic which helped. Has been taking doxazosin PRN for elevated BP. Patient had visit with NP and would like doxazosin changed to PRN. Had concern over weight and would like to keep dry weight it below 110 lb.

## 2019-01-27 ENCOUNTER — TELEPHONE (OUTPATIENT)
Dept: INTERNAL MEDICINE | Facility: CLINIC | Age: 74
End: 2019-01-27

## 2019-01-27 NOTE — TELEPHONE ENCOUNTER
Please let patient know that her labs are stable. Notably her potassium was normal, and kidney function stable.    Her anemia labs showed that her blood count is improving, this is great!    Thanks,  WENDY

## 2019-01-28 ENCOUNTER — INFUSION (OUTPATIENT)
Dept: INFECTIOUS DISEASES | Facility: HOSPITAL | Age: 74
End: 2019-01-28
Attending: INTERNAL MEDICINE
Payer: MEDICARE

## 2019-01-28 ENCOUNTER — OFFICE VISIT (OUTPATIENT)
Dept: PRIMARY CARE CLINIC | Facility: CLINIC | Age: 74
End: 2019-01-28
Payer: MEDICARE

## 2019-01-28 ENCOUNTER — TELEPHONE (OUTPATIENT)
Dept: INTERNAL MEDICINE | Facility: CLINIC | Age: 74
End: 2019-01-28

## 2019-01-28 VITALS
WEIGHT: 112.63 LBS | OXYGEN SATURATION: 98 % | SYSTOLIC BLOOD PRESSURE: 150 MMHG | HEART RATE: 58 BPM | HEIGHT: 64 IN | DIASTOLIC BLOOD PRESSURE: 78 MMHG | BODY MASS INDEX: 19.23 KG/M2

## 2019-01-28 VITALS
DIASTOLIC BLOOD PRESSURE: 64 MMHG | SYSTOLIC BLOOD PRESSURE: 129 MMHG | WEIGHT: 110 LBS | HEIGHT: 64 IN | BODY MASS INDEX: 18.78 KG/M2

## 2019-01-28 DIAGNOSIS — D69.2 SENILE PURPURA: ICD-10-CM

## 2019-01-28 DIAGNOSIS — N18.4 ANEMIA OF CHRONIC RENAL FAILURE, STAGE 4 (SEVERE): ICD-10-CM

## 2019-01-28 DIAGNOSIS — F32.5 MAJOR DEPRESSIVE DISORDER WITH SINGLE EPISODE, IN FULL REMISSION: ICD-10-CM

## 2019-01-28 DIAGNOSIS — M32.19 OTHER SYSTEMIC LUPUS ERYTHEMATOSUS WITH OTHER ORGAN INVOLVEMENT: Chronic | ICD-10-CM

## 2019-01-28 DIAGNOSIS — E44.0 MALNUTRITION OF MODERATE DEGREE: ICD-10-CM

## 2019-01-28 DIAGNOSIS — G63 NEUROPATHY DUE TO SLE (SYSTEMIC LUPUS ERYTHEMATOSUS): ICD-10-CM

## 2019-01-28 DIAGNOSIS — I77.1 TORTUOUS AORTA: ICD-10-CM

## 2019-01-28 DIAGNOSIS — M32.9 NEUROPATHY DUE TO SLE (SYSTEMIC LUPUS ERYTHEMATOSUS): ICD-10-CM

## 2019-01-28 DIAGNOSIS — N25.81 SECONDARY HYPERPARATHYROIDISM OF RENAL ORIGIN: ICD-10-CM

## 2019-01-28 DIAGNOSIS — Z79.899 POLYPHARMACY: ICD-10-CM

## 2019-01-28 DIAGNOSIS — D63.1 ANEMIA OF CHRONIC RENAL FAILURE, STAGE 4 (SEVERE): Primary | ICD-10-CM

## 2019-01-28 DIAGNOSIS — N18.4 ANEMIA OF CHRONIC RENAL FAILURE, STAGE 4 (SEVERE): Primary | ICD-10-CM

## 2019-01-28 DIAGNOSIS — D63.1 ANEMIA OF CHRONIC RENAL FAILURE, STAGE 4 (SEVERE): ICD-10-CM

## 2019-01-28 PROCEDURE — 99499 UNLISTED E&M SERVICE: CPT | Mod: HCNC,S$GLB,, | Performed by: INTERNAL MEDICINE

## 2019-01-28 PROCEDURE — 3078F PR MOST RECENT DIASTOLIC BLOOD PRESSURE < 80 MM HG: ICD-10-PCS | Mod: HCNC,CPTII,S$GLB, | Performed by: INTERNAL MEDICINE

## 2019-01-28 PROCEDURE — 63600175 PHARM REV CODE 636 W HCPCS: Mod: HCNC,JG,EC | Performed by: INTERNAL MEDICINE

## 2019-01-28 PROCEDURE — 96372 THER/PROPH/DIAG INJ SC/IM: CPT | Mod: HCNC

## 2019-01-28 PROCEDURE — 99215 PR OFFICE/OUTPT VISIT, EST, LEVL V, 40-54 MIN: ICD-10-PCS | Mod: HCNC,25,S$GLB, | Performed by: INTERNAL MEDICINE

## 2019-01-28 PROCEDURE — 3078F DIAST BP <80 MM HG: CPT | Mod: HCNC,CPTII,S$GLB, | Performed by: INTERNAL MEDICINE

## 2019-01-28 PROCEDURE — 99499 RISK ADDL DX/OHS AUDIT: ICD-10-PCS | Mod: HCNC,S$GLB,, | Performed by: INTERNAL MEDICINE

## 2019-01-28 PROCEDURE — 99215 OFFICE O/P EST HI 40 MIN: CPT | Mod: HCNC,25,S$GLB, | Performed by: INTERNAL MEDICINE

## 2019-01-28 PROCEDURE — 1101F PR PT FALLS ASSESS DOC 0-1 FALLS W/OUT INJ PAST YR: ICD-10-PCS | Mod: HCNC,CPTII,S$GLB, | Performed by: INTERNAL MEDICINE

## 2019-01-28 PROCEDURE — 3074F SYST BP LT 130 MM HG: CPT | Mod: HCNC,CPTII,S$GLB, | Performed by: INTERNAL MEDICINE

## 2019-01-28 PROCEDURE — 1101F PT FALLS ASSESS-DOCD LE1/YR: CPT | Mod: HCNC,CPTII,S$GLB, | Performed by: INTERNAL MEDICINE

## 2019-01-28 PROCEDURE — 3074F PR MOST RECENT SYSTOLIC BLOOD PRESSURE < 130 MM HG: ICD-10-PCS | Mod: HCNC,CPTII,S$GLB, | Performed by: INTERNAL MEDICINE

## 2019-01-28 RX ADMIN — DARBEPOETIN ALFA 25 MCG: 25 INJECTION, SOLUTION INTRAVENOUS; SUBCUTANEOUS at 08:01

## 2019-01-28 NOTE — PROGRESS NOTES
Hgb 10.1;Hct 32.6    2nd dose Aranesp 25 mcg given  PER PROTOCOL FORM DROD-188.  2 week return appt set.    Gfr 15/stage 4

## 2019-01-28 NOTE — PATIENT INSTRUCTIONS
TODAY:  - take doxazosin 2mg every evening (plus other meds in the pill pack)  - keep recording BPs  - PCP messaged Dr Ramirez (Rheum) about follow-up  - Information on [facility]   + [facility]       [id number]      [phone]

## 2019-01-28 NOTE — ASSESSMENT & PLAN NOTE
Diagnosed in 1990s, pos CARYN, SSB. Biopsy showed cutaneous involvment  · Continue plaquenil  · Completed prednisone for erythema nodosum  · F/U Rheum  · PCP previously messaged Dr Ramirez about vision changes  · To undergo neuropsych eval  · PCP messaged Dr Ramirez today about f/u

## 2019-01-28 NOTE — ASSESSMENT & PLAN NOTE
Followed by Nephrology, PRN iron infusions, arenesp injections  · F/U Nephrology  · Iron infusion PRN  · Continue arenesp

## 2019-01-28 NOTE — PROGRESS NOTES
Patient, Ewelina Arnold (MRN #[id number]), presented with a recent estimated Glumerular Filtration Rate (eGFR) less than 15 consistent with the definition of chronic kidney disease stage 5 (ICD10 - N18.5).    eGFR if    Date Value Ref Range Status   01/25/2019 13.7 (A) >60 mL/min/1.73 m^2 Final       The patient's chronic kidney disease stage 5 was monitored, evaluated, addressed and/or treated. This addendum to the medical record is made on 01/28/2019.

## 2019-01-28 NOTE — ASSESSMENT & PLAN NOTE
Low BMI, poor PO intake, uncontrolled chronic diseases  · Advised improved nutrition  · Low salt diet

## 2019-01-28 NOTE — PROGRESS NOTES
"Primary Care Provider Appointment    Subjective:      Patient ID: Ewelina Arnold is a 73 y.o. female with CHF, HTN, HLD, CKD    Chief Complaint: Hypertension (pt said HBP being going up/down ); Follow-up; and Medication Problem    Patient states today, "I'm feeling better than I used to." She is driving to her own appointments now, and more mobile than previously now that her CHF is better compensated. She is doing a regular exercise program at [employer] two times weekly. She completed cardiac rehab at [facility] with good results. She stopped due to a hospital admit, then did not return because she felt she could challenge herself safely at the gym.    Patient with compensated CHF, compliant with all meds through pill packing. Her BPs are elevated regularly, she is taking doxazosin 2mg PRN for elevated BPs. This med is not in her pill pack. Her dry weight is ~109-111lbs (per Nephro), and 110# (per Cardiology) patient has been in that range with meticulous dietary changes and med compliance. She recently had a 6MWT and did not desaturate. She sees Dr Burdick (heart failure) in 4/2019, echo due then too.  Time Taken Time Submitted Systolic Blood Pressure Diastolic Blood Pressure Pulse (bpm) Weight (lb) Steps Walked (steps)   1/28/2019  6:36 AM 1/28/2019  6:37 AM    108.9    1/27/2019  8:53 AM 1/27/2019  8:53 AM    109.8    1/26/2019  9:01 AM 1/26/2019  9:02 AM    109.8    1/25/2019  7:40 AM 1/25/2019  7:40 AM    111.3    1/25/2019  7:38 AM 1/25/2019  7:38 AM    111.3    1/24/2019  5:39 AM 1/24/2019  5:39 AM    111.1    1/23/2019  9:58 AM 1/23/2019  9:59 AM    108.9    1/22/2019  7:05 AM 1/22/2019  7:05 AM    110.5      She has neuropsych eval upcoming. Has recent history of hallucinations and short term memory loss. All work-ups with optho and Neuro negatuve thus far. She feels socially isolated, "I feel like I'm gonna crawl the frank." [facility] called with her today, and appointment made for 2/18/19 for " intake, paperwork printed.    Her blood count is improving with aranesp with Nephro. Per her last Neprho note, her PTH is at goal. Electrolytes stable.     Her LE nodules (presumed erythema nodosum due to lupus) have resolved. She was treated with steroids, then experienced a CHF exacerbation. Is better now, and may be due for Rheum follow-up.    Pill packs as follows, patient is adherent:  Adherence packed for 30 days using Om[employer] DOS monthly packs:     Morning card:  Asprin 81 mg  Carvedlol 25 mg  Citalopram 20 mg (1&1/2 tabs)  Famotidine 20 mg  Hydroxychloroquine 200 mg  Isosorbide Mononitrate ER 60 mg  Levothyroxine 75 mcg  Nifedipine ER Osmotic 60 mg  Sodium Bicarb 650 mg  Vitamin D 12577 IU        Evening card:  Atorvastatin 80 mg  Carvedilol 25 mg  Hydroxychloroquine 200 mg  Nifedipine ER Osmotic 60 mg  Sodium Bicarb 650 mg     Changed Coreg to 25 mg          Electronically signed by Seth Escudero, PharmD at 1/9/2019  5:23 PM       Past Surgical History:   Procedure Laterality Date    BIOPSY-BONE MARROW Left 10/16/2017    Performed by [facility]an [facility] at [facility] OR 2ND FLR    CARDIAC CATHETERIZATION  07/27/2011    x1    CHOLECYSTECTOMY  1999    COLON SURGERY  2000 & 2003    partial removal    COLONOSCOPY N/A 9/14/2017    Performed by [facility]en [facility] at [facility] ENDO (4TH FLR)    COLONOSCOPY N/A 4/14/2016    Performed by [facility] at [facility] ENDO (4TH FLR)    COLONOSCOPY N/A 4/23/2013    Performed by [facility] at [facility] ENDO (4TH FLR)    TALIA-TRANSFORAMINAL Left 11/3/2016    Performed by Brett [facility] at [facility]    ESOPHAGOGASTRODUODENOSCOPY (EGD) N/A 3/16/2017    [facility] ENDO    EYE SURGERY      HAND SURGERY Bilateral 1996 & 1997    due to carpal tunnel     HERNIA REPAIR      HYSTERECTOMY  1983    INJECTION-STEROID-EPIDURAL-TRANSFORAMINAL Left 1/7/2016    Performed by Brett [facility]    Left heart cath  "Left 12/20/2017    [facility] CATH LAB    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    OOPHORECTOMY      removed one    Peripheral Iridotomy both eyes  1994    laser angle correction    THYROIDECTOMY, PARTIAL  2006    to remove two nodules and one-half thyroid       Past Medical History:   Diagnosis Date    Acute hypoxemic respiratory failure 4/28/2018    Acute on chronic diastolic congestive heart failure 11/17/2017    Allergy     Anatomical narrow angle glaucoma     Anemia     States diagnosed about a month ago    Aortic atherosclerosis     Arthritis     Cataract     CHF (congestive heart failure)     Chronic kidney disease     Chronic sciatica of left side     Right lower back pain due to sciatica    Coronary artery disease     Depression     Dry eyes     GERD (gastroesophageal reflux disease)     History of colon cancer     Hyperaldosteronism     Hyperlipidemia     Hypertension     Hypothyroidism     Keloid cicatrix     Left ventricular diastolic dysfunction with preserved systolic function     Lupus (systemic lupus erythematosus) 8/17/2012    Malnutrition 5/16/2017    Osteoarthritis of cervical spine 8/17/2012    Osteopenia     PAD (peripheral artery disease)     FRAN (renal artery stenosis)        Review of Systems   Constitutional: Positive for activity change. Negative for appetite change.   Respiratory: Negative for cough and shortness of breath.    Cardiovascular: Negative for leg swelling.   Genitourinary: Negative for difficulty urinating and dysuria.   Skin: Positive for wound.   Neurological: Negative for dizziness and light-headedness.   Hematological: Bruises/bleeds easily.   Psychiatric/Behavioral: Negative for agitation, behavioral problems, confusion, decreased concentration and dysphoric mood.       Objective:   BP (!) 150/78 (BP Location: Left arm, Patient Position: Sitting, BP Method: Medium (Manual))   Pulse (!) 58   Ht 5' 4" (1.626 " m)   Wt 51.1 kg (112 lb 10.5 oz)   LMP  (LMP Unknown) Comment: 99  SpO2 98%   BMI 19.34 kg/m²     Physical Exam   Constitutional: She is oriented to person, place, and time. She appears well-developed and well-nourished.   HENT:   Head: Normocephalic and atraumatic.   Neck: Normal range of motion.   Pulmonary/Chest: Effort normal.   Musculoskeletal: She exhibits deformity. She exhibits no edema.   Resolution of LE nodules   Neurological: She is alert and oriented to person, place, and time.   Psychiatric: She has a normal mood and affect. Her behavior is normal. Thought content normal.   Vitals reviewed.      Lab Results   Component Value Date    WBC 3.18 (L) 12/18/2018    HGB 10.1 (L) 01/25/2019    HCT 32.6 (L) 01/25/2019     12/18/2018    CHOL 181 08/28/2018    TRIG 54 08/28/2018    HDL 66 08/28/2018    ALT 13 01/25/2019    AST 36 01/25/2019     01/25/2019    K 3.7 01/25/2019     01/25/2019    CREATININE 3.6 (H) 01/25/2019    BUN 75 (H) 01/25/2019    CO2 30 (H) 01/25/2019    TSH 1.798 01/25/2019    INR 1.0 09/05/2018    GLUF 79 12/02/2011    HGBA1C 5.2 09/25/2018         Assessment:   73 y.o. 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)     Upper lip numbness, SLE treated with plaquenil, followed by Rheum   · F/U Rheum  · Continue plaquenil            Psychiatric    Major depressive disorder with single episode, in full remission     Stable with better control of chronic issues  · Monitor  · Treat chronic conditions  · Consider antidepressant in the future  · Advised active lifestyle  · Exercise  · Social activity            Cardiac/Vascular    Tortuous aorta     Seen on imaging in 7/2016  · monitor            Immunology/Multi System    Other forms of systemic lupus erythematosus (Chronic)      Diagnosed in 1990s, pos CARYN, SSB. Biopsy showed cutaneous involvment  · Continue plaquenil  · Completed prednisone for erythema nodosum  · F/U Rheum  · PCP previously messaged Dr Ramirez about vision changes  · To undergo neuropsych eval  · PCP messaged Dr Ramirez today about f/u            Hematology    Senile purpura     Ecchymoses and purpura on UE bilaterally  · monitor            Oncology    Anemia of chronic renal failure, stage 4 (severe)     Followed by Nephrology, PRN iron infusions, arenesp injections  · F/U Nephrology  · Iron infusion PRN  · Continue arenesp            Endocrine    Secondary hyperparathyroidism of renal origin     Hypovitaminosis D, CKD  · Supplement D  · monitor         Malnutrition of moderate degree     Low BMI, poor PO intake, uncontrolled chronic diseases  · Advised improved nutrition  · Low salt diet            Other    Polypharmacy     Complicated regimen with numerous cardiac and nephro meds, at risk for hospitalization due to med noncompliance  · Pill packing at pharmacy  · Close follow-up               Health Maintenance       Date Due Completion Date    High Dose Statin 01/10/2020 1/10/2019    Mammogram 09/11/2020 9/11/2018- wants annual mammo    DEXA SCAN 01/08/2022 1/8/2019    Colonoscopy 09/14/2022 9/14/2017    Lipid Panel 08/28/2023 8/28/2018    TETANUS VACCINE 06/29/2026 6/29/2016          Follow-up in about 6 weeks (around 3/11/2019). One hour spent with this patient today, half of that in counseling.    Amarilys Johns MD/MPH  Internal Medicine  [facility]  [phone]

## 2019-01-28 NOTE — ASSESSMENT & PLAN NOTE
Stable with better control of chronic issues  · Monitor  · Treat chronic conditions  · Consider antidepressant in the future  · Advised active lifestyle  · Exercise  · Social activity

## 2019-01-28 NOTE — TELEPHONE ENCOUNTER
Please let patient know that she will see Rheum (Dr Ramirez) on 2/13/19.    Thanks,  WENDY    ----- Message from Hui Bates RN sent at 1/28/2019 11:34 AM CST -----  She has been scheduled for 2/13/19  ----- Message -----  From: Amarilys Johns MD  Sent: 1/28/2019  10:09 AM  To: James TERRELL Staff    Dear Dr Ramirez,  Ms Clayton' skin nodules have resolved with the prednisone course in 8/2018. When do you want to see her for follow-up of lupus?    Thanks,  WENDY (PCP)

## 2019-01-28 NOTE — PLAN OF CARE
Problem: Adult Inpatient Plan of Care  Goal: Absence of Hospital-Acquired Illness or Injury    Intervention: Prevent Infection  PATIENT EDUCATED ON HAND WASHING AND INFECTION CONTROL. PATIENT VERBALIZED UNDERSTANDING

## 2019-02-07 ENCOUNTER — TELEPHONE (OUTPATIENT)
Dept: INTERNAL MEDICINE | Facility: CLINIC | Age: 74
End: 2019-02-07

## 2019-02-07 ENCOUNTER — INITIAL CONSULT (OUTPATIENT)
Dept: NEUROLOGY | Facility: CLINIC | Age: 74
End: 2019-02-07
Payer: MEDICARE

## 2019-02-07 ENCOUNTER — LAB VISIT (OUTPATIENT)
Dept: LAB | Facility: HOSPITAL | Age: 74
End: 2019-02-07
Attending: INTERNAL MEDICINE
Payer: MEDICARE

## 2019-02-07 DIAGNOSIS — N18.4 ANEMIA OF CHRONIC RENAL FAILURE, STAGE 4 (SEVERE): ICD-10-CM

## 2019-02-07 DIAGNOSIS — F09 COGNITIVE DYSFUNCTION: ICD-10-CM

## 2019-02-07 DIAGNOSIS — F43.21 ADJUSTMENT DISORDER WITH DEPRESSED MOOD: ICD-10-CM

## 2019-02-07 DIAGNOSIS — D63.1 ANEMIA OF CHRONIC RENAL FAILURE, STAGE 4 (SEVERE): ICD-10-CM

## 2019-02-07 DIAGNOSIS — M32.19 OTHER SYSTEMIC LUPUS ERYTHEMATOSUS WITH OTHER ORGAN INVOLVEMENT: Chronic | ICD-10-CM

## 2019-02-07 DIAGNOSIS — F06.70 MILD VASCULAR NEUROCOGNITIVE DISORDER: ICD-10-CM

## 2019-02-07 DIAGNOSIS — I99.9 MILD VASCULAR NEUROCOGNITIVE DISORDER: ICD-10-CM

## 2019-02-07 LAB
C3 SERPL-MCNC: 98 MG/DL
C4 SERPL-MCNC: 23 MG/DL
CRP SERPL-MCNC: 0.2 MG/L
DAT IGG-SP REAG RBC-IMP: NORMAL
ERYTHROCYTE [SEDIMENTATION RATE] IN BLOOD BY WESTERGREN METHOD: 24 MM/HR
HCT VFR BLD AUTO: 32.7 %
HGB BLD-MCNC: 10.1 G/DL
IRON SERPL-MCNC: 41 UG/DL
SATURATED IRON: 16 %
TOTAL IRON BINDING CAPACITY: 255 UG/DL
TRANSFERRIN SERPL-MCNC: 172 MG/DL

## 2019-02-07 PROCEDURE — 86140 C-REACTIVE PROTEIN: CPT | Mod: HCNC

## 2019-02-07 PROCEDURE — 96132 NRPSYC TST EVAL PHYS/QHP 1ST: CPT | Mod: HCNC,S$GLB,, | Performed by: CLINICAL NEUROPSYCHOLOGIST

## 2019-02-07 PROCEDURE — 85652 RBC SED RATE AUTOMATED: CPT | Mod: HCNC

## 2019-02-07 PROCEDURE — 99499 UNLISTED E&M SERVICE: CPT | Mod: HCNC,S$GLB,, | Performed by: CLINICAL NEUROPSYCHOLOGIST

## 2019-02-07 PROCEDURE — 86225 DNA ANTIBODY NATIVE: CPT | Mod: HCNC

## 2019-02-07 PROCEDURE — 90791 PSYCH DIAGNOSTIC EVALUATION: CPT | Mod: HCNC,S$GLB,, | Performed by: CLINICAL NEUROPSYCHOLOGIST

## 2019-02-07 PROCEDURE — 85018 HEMOGLOBIN: CPT | Mod: HCNC

## 2019-02-07 PROCEDURE — 83540 ASSAY OF IRON: CPT | Mod: HCNC

## 2019-02-07 PROCEDURE — 86160 COMPLEMENT ANTIGEN: CPT | Mod: HCNC

## 2019-02-07 PROCEDURE — 86160 COMPLEMENT ANTIGEN: CPT | Mod: 59,HCNC

## 2019-02-07 PROCEDURE — 99499 NO LOS: ICD-10-PCS | Mod: HCNC,S$GLB,, | Performed by: CLINICAL NEUROPSYCHOLOGIST

## 2019-02-07 PROCEDURE — 96139 PR PSYCH/NEUROPSYCH TEST ADMIN/SCORING, BY TECH, 2+ TESTS, EA ADDTL 30 MIN: ICD-10-PCS | Mod: HCNC,S$GLB,, | Performed by: CLINICAL NEUROPSYCHOLOGIST

## 2019-02-07 PROCEDURE — 90791 PR PSYCHIATRIC DIAGNOSTIC EVALUATION: ICD-10-PCS | Mod: HCNC,S$GLB,, | Performed by: CLINICAL NEUROPSYCHOLOGIST

## 2019-02-07 PROCEDURE — 85014 HEMATOCRIT: CPT | Mod: HCNC

## 2019-02-07 PROCEDURE — 96138 PSYCL/NRPSYC TECH 1ST: CPT | Mod: HCNC,S$GLB,, | Performed by: CLINICAL NEUROPSYCHOLOGIST

## 2019-02-07 PROCEDURE — 96139 PSYCL/NRPSYC TST TECH EA: CPT | Mod: HCNC,S$GLB,, | Performed by: CLINICAL NEUROPSYCHOLOGIST

## 2019-02-07 PROCEDURE — 96138 PR PSYCH/NEUROPSYCH TEST ADMIN/SCORING, BY TECH, 2+ TESTS, 1ST 30 MIN: ICD-10-PCS | Mod: HCNC,S$GLB,, | Performed by: CLINICAL NEUROPSYCHOLOGIST

## 2019-02-07 PROCEDURE — 96132 PR NEUROPSYCHOLOGIC TEST EVAL SVCS, 1ST HR: ICD-10-PCS | Mod: HCNC,S$GLB,, | Performed by: CLINICAL NEUROPSYCHOLOGIST

## 2019-02-07 PROCEDURE — 86880 COOMBS TEST DIRECT: CPT | Mod: HCNC
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
## 2019-02-08 LAB — DSDNA AB SER-ACNC: NORMAL [IU]/ML

## 2019-02-11 ENCOUNTER — INFUSION (OUTPATIENT)
Dept: INFECTIOUS DISEASES | Facility: HOSPITAL | Age: 74
End: 2019-02-11
Attending: INTERNAL MEDICINE
Payer: MEDICARE

## 2019-02-11 VITALS
BODY MASS INDEX: 19.61 KG/M2 | WEIGHT: 114.88 LBS | HEIGHT: 64 IN | DIASTOLIC BLOOD PRESSURE: 70 MMHG | SYSTOLIC BLOOD PRESSURE: 147 MMHG

## 2019-02-11 DIAGNOSIS — D63.1 ANEMIA OF CHRONIC RENAL FAILURE, STAGE 4 (SEVERE): ICD-10-CM

## 2019-02-11 DIAGNOSIS — N18.4 ANEMIA OF CHRONIC RENAL FAILURE, STAGE 4 (SEVERE): ICD-10-CM

## 2019-02-11 DIAGNOSIS — D63.1 ANEMIA OF CHRONIC KIDNEY FAILURE, STAGE 5: Primary | ICD-10-CM

## 2019-02-11 DIAGNOSIS — N18.5 ANEMIA OF CHRONIC KIDNEY FAILURE, STAGE 5: Primary | ICD-10-CM

## 2019-02-11 PROCEDURE — 63600175 PHARM REV CODE 636 W HCPCS: Mod: HCNC,JG,EC | Performed by: INTERNAL MEDICINE

## 2019-02-11 PROCEDURE — 96372 THER/PROPH/DIAG INJ SC/IM: CPT | Mod: HCNC

## 2019-02-11 RX ADMIN — DARBEPOETIN ALFA 25 MCG: 25 INJECTION, SOLUTION INTRAVENOUS; SUBCUTANEOUS at 09:02

## 2019-02-11 NOTE — PROGRESS NOTES
Hgb 10.1;Hct 32.7    3rd dose Aranesp 25 mcg given  PER PROTOCOL FORM DROD-188.  2 week return appt set. Iron sat's are at 16% patient can not tolerate Oral iron supplements.  Dr Epperson notified.    Gfr 13.7/stage 4

## 2019-02-13 ENCOUNTER — OFFICE VISIT (OUTPATIENT)
Dept: RHEUMATOLOGY | Facility: CLINIC | Age: 74
End: 2019-02-13
Payer: MEDICARE

## 2019-02-13 VITALS
SYSTOLIC BLOOD PRESSURE: 150 MMHG | BODY MASS INDEX: 19.75 KG/M2 | DIASTOLIC BLOOD PRESSURE: 71 MMHG | HEART RATE: 65 BPM | WEIGHT: 115.06 LBS

## 2019-02-13 DIAGNOSIS — Z79.899 HIGH RISK MEDICATION USE: ICD-10-CM

## 2019-02-13 DIAGNOSIS — M32.19 OTHER SYSTEMIC LUPUS ERYTHEMATOSUS WITH OTHER ORGAN INVOLVEMENT: Primary | Chronic | ICD-10-CM

## 2019-02-13 PROCEDURE — 1101F PR PT FALLS ASSESS DOC 0-1 FALLS W/OUT INJ PAST YR: ICD-10-PCS | Mod: HCNC,CPTII,S$GLB, | Performed by: INTERNAL MEDICINE

## 2019-02-13 PROCEDURE — 99999 PR PBB SHADOW E&M-EST. PATIENT-LVL III: CPT | Mod: PBBFAC,HCNC,, | Performed by: INTERNAL MEDICINE

## 2019-02-13 PROCEDURE — 99499 RISK ADDL DX/OHS AUDIT: ICD-10-PCS | Mod: HCNC,S$GLB,, | Performed by: INTERNAL MEDICINE

## 2019-02-13 PROCEDURE — 99214 OFFICE O/P EST MOD 30 MIN: CPT | Mod: HCNC,S$GLB,, | Performed by: INTERNAL MEDICINE

## 2019-02-13 PROCEDURE — 3077F SYST BP >= 140 MM HG: CPT | Mod: HCNC,CPTII,S$GLB, | Performed by: INTERNAL MEDICINE

## 2019-02-13 PROCEDURE — 1101F PT FALLS ASSESS-DOCD LE1/YR: CPT | Mod: HCNC,CPTII,S$GLB, | Performed by: INTERNAL MEDICINE

## 2019-02-13 PROCEDURE — 3077F PR MOST RECENT SYSTOLIC BLOOD PRESSURE >= 140 MM HG: ICD-10-PCS | Mod: HCNC,CPTII,S$GLB, | Performed by: INTERNAL MEDICINE

## 2019-02-13 PROCEDURE — 99999 PR PBB SHADOW E&M-EST. PATIENT-LVL III: ICD-10-PCS | Mod: PBBFAC,HCNC,, | Performed by: INTERNAL MEDICINE

## 2019-02-13 PROCEDURE — 3078F PR MOST RECENT DIASTOLIC BLOOD PRESSURE < 80 MM HG: ICD-10-PCS | Mod: HCNC,CPTII,S$GLB, | Performed by: INTERNAL MEDICINE

## 2019-02-13 PROCEDURE — 99499 UNLISTED E&M SERVICE: CPT | Mod: HCNC,S$GLB,, | Performed by: INTERNAL MEDICINE

## 2019-02-13 PROCEDURE — 99214 PR OFFICE/OUTPT VISIT, EST, LEVL IV, 30-39 MIN: ICD-10-PCS | Mod: HCNC,S$GLB,, | Performed by: INTERNAL MEDICINE

## 2019-02-13 PROCEDURE — 3078F DIAST BP <80 MM HG: CPT | Mod: HCNC,CPTII,S$GLB, | Performed by: INTERNAL MEDICINE

## 2019-02-13 RX ORDER — HYDROXYCHLOROQUINE SULFATE 200 MG/1
200 TABLET, FILM COATED ORAL DAILY
Qty: 90 TABLET | Refills: 1 | Status: SHIPPED | OUTPATIENT
Start: 2019-02-13 | End: 2019-04-24

## 2019-02-13 ASSESSMENT — ROUTINE ASSESSMENT OF PATIENT INDEX DATA (RAPID3)
PAIN SCORE: 3
PSYCHOLOGICAL DISTRESS SCORE: 3.3
FATIGUE SCORE: 2
MDHAQ FUNCTION SCORE: .5
TOTAL RAPID3 SCORE: 3.06
AM STIFFNESS SCORE: 0, NO
PATIENT GLOBAL ASSESSMENT SCORE: 4.5

## 2019-02-13 ASSESSMENT — SYSTEMIC LUPUS ERYTHEMATOSUS DISEASE ACTIVITY INDEX (SLEDAI): TOTAL_SCORE: 0

## 2019-02-13 NOTE — PROGRESS NOTES
Subjective:       Patient ID: Ewelina Arnold is a 73 y.o. female.    Chief Complaint: Follow-up and Disease Management    Early 1990's developed acute respiratory failure and spent weeks in ICU [facility]    Positive CARYN, SSB    Hx Rash, leukopenia    On hydroxychloroquine >5 yr; saw eye MD Sept 2016 June 2018 developed new DLE lesions     Hx:  CAD and Aortic stenosis and hx CHF; s/p PCI; ASA , rosuvastatin, carvedilol, furosemide, isosorbide, metolazone ; has appt with Dr. Burdick (unhappy with [facility])  HTN nifedipine  Pulmonary nodule; saw Dr Logan Sept; no change  Thyroid disease on levothyroxine  CKD; she donated a kidney to her brother; is a patient of Dr Ruffin  Anemia evaluated by Dr. Pham     Skin has improved with HCQ and topical steroid  No other new lupus sx  Went to eye clinic last month     Review of Systems   Constitutional: Positive for unexpected weight change. Negative for fatigue and fever.   HENT: Positive for mouth sores. Negative for trouble swallowing.         Dry mouth   Eyes: Negative for redness.        Dry eyes   Respiratory: Negative for cough and shortness of breath.    Cardiovascular: Negative for chest pain.   Gastrointestinal: Negative for abdominal pain, constipation and diarrhea.   Skin: Positive for rash.   Neurological: Positive for headaches.   Hematological: Negative for adenopathy. Does not bruise/bleed easily.   Psychiatric/Behavioral: Negative for sleep disturbance.         Objective:   BP (!) 150/71   Pulse 65   Wt 52.2 kg (115 lb 1.3 oz)   LMP  (LMP Unknown) Comment: 99  BMI 19.75 kg/m²      Physical Exam   Constitutional: She is well-developed, well-nourished, and in no distress.   HENT:   Mouth/Throat: Oropharynx is clear and moist.   Eyes: Conjunctivae are normal.   Cardiovascular: Normal rate and regular rhythm.    Murmur heard.  Pulmonary/Chest: She has no wheezes. She has no rales.   Lymphadenopathy:     She has no cervical adenopathy.   Skin: Rash noted.      Hyperpigmentation at site of previous SCLE lesions on back         Lab Results   Component Value Date    SEDRATE 24 02/07/2019      Assessment:       1. Other systemic lupus erythematosus with other organ involvement    2. High risk medication use - Both Eyes            Plan:       Problem List Items Addressed This Visit        Active Problems    Other forms of systemic lupus erythematosus - Primary (Chronic)     Lupus clinically stable with no new sx and improvement in skin lesions   Lab also ok with no increase in inflammatory markers  She has CKD and HCQ is at standard dose so will reduce this to adjust for CKD; 200 mg once daily    Plan  once daily  Repeat lupus labs in 6 mo and f/u then             Relevant Medications    hydroxychloroquine (PLAQUENIL) 200 mg tablet    Other Relevant Orders    CBC auto differential    Comprehensive metabolic panel    High risk medication use - Both Eyes     Up to date on HCQ optometry monitoring

## 2019-02-13 NOTE — PROGRESS NOTES
Adherence packed for 30 days using [employer] monthly packs:     Morning card:  Asprin 81 mg  Carvedlol 25 mg  Citalopram 20 mg (1&1/2 tabs)  Famotidine 20 mg  Hydroxychloroquine 200 mg  Isosorbide Mononitrate ER 60 mg  Levothyroxine 75 mcg  Nifedipine ER Osmotic 60 mg  Sodium Bicarb 650 mg  Vitamin D 64746 IU (Every Saturdays pocket)        Evening card:  Atorvastatin 80 mg  Carvedilol 25 mg  Nifedipine ER Osmotic 60 mg  Sodium Bicarb 650 mg  Doxasosin 2 mg     Added Doxazosin 2 mg, decreased Hydroxychloroquine 200 mg to once daily

## 2019-02-13 NOTE — ASSESSMENT & PLAN NOTE
Lupus clinically stable with no new sx and improvement in skin lesions   Lab also ok with no increase in inflammatory markers  She has CKD and HCQ is at standard dose so will reduce this to adjust for CKD; 200 mg once daily    Plan  once daily  Repeat lupus labs in 6 mo and f/u then

## 2019-02-22 ENCOUNTER — LAB VISIT (OUTPATIENT)
Dept: LAB | Facility: HOSPITAL | Age: 74
End: 2019-02-22
Attending: INTERNAL MEDICINE
Payer: MEDICARE

## 2019-02-22 DIAGNOSIS — N18.5 ANEMIA OF CHRONIC KIDNEY FAILURE, STAGE 5: ICD-10-CM

## 2019-02-22 DIAGNOSIS — D63.1 ANEMIA OF CHRONIC KIDNEY FAILURE, STAGE 5: ICD-10-CM

## 2019-02-22 LAB
HCT VFR BLD AUTO: 35.3 %
HGB BLD-MCNC: 11 G/DL
IRON SERPL-MCNC: 44 UG/DL
SATURATED IRON: 19 %
TOTAL IRON BINDING CAPACITY: 237 UG/DL
TRANSFERRIN SERPL-MCNC: 160 MG/DL

## 2019-02-22 PROCEDURE — 85018 HEMOGLOBIN: CPT | Mod: HCNC

## 2019-02-22 PROCEDURE — 36415 COLL VENOUS BLD VENIPUNCTURE: CPT | Mod: HCNC,PO

## 2019-02-22 PROCEDURE — 83540 ASSAY OF IRON: CPT | Mod: HCNC

## 2019-02-22 PROCEDURE — 85014 HEMATOCRIT: CPT | Mod: HCNC

## 2019-02-25 ENCOUNTER — INFUSION (OUTPATIENT)
Dept: INFECTIOUS DISEASES | Facility: HOSPITAL | Age: 74
End: 2019-02-25
Attending: INTERNAL MEDICINE
Payer: MEDICARE

## 2019-02-25 VITALS
SYSTOLIC BLOOD PRESSURE: 162 MMHG | WEIGHT: 115.5 LBS | BODY MASS INDEX: 19.72 KG/M2 | DIASTOLIC BLOOD PRESSURE: 77 MMHG | HEIGHT: 64 IN

## 2019-02-25 DIAGNOSIS — N18.4 ANEMIA OF CHRONIC RENAL FAILURE, STAGE 4 (SEVERE): ICD-10-CM

## 2019-02-25 DIAGNOSIS — N18.5 ANEMIA IN STAGE 5 CHRONIC KIDNEY DISEASE: Primary | ICD-10-CM

## 2019-02-25 DIAGNOSIS — D63.1 ANEMIA OF CHRONIC RENAL FAILURE, STAGE 4 (SEVERE): ICD-10-CM

## 2019-02-25 DIAGNOSIS — D63.1 ANEMIA IN STAGE 5 CHRONIC KIDNEY DISEASE: Primary | ICD-10-CM

## 2019-02-25 PROCEDURE — 96372 THER/PROPH/DIAG INJ SC/IM: CPT | Mod: HCNC

## 2019-02-25 PROCEDURE — 63600175 PHARM REV CODE 636 W HCPCS: Mod: HCNC,JG,EC | Performed by: INTERNAL MEDICINE

## 2019-02-25 RX ADMIN — DARBEPOETIN ALFA 25 MCG: 25 INJECTION, SOLUTION INTRAVENOUS; SUBCUTANEOUS at 11:02

## 2019-02-25 NOTE — PROGRESS NOTES
Hgb 11.0;Hct 35.3    No dose change  PER PROTOCOL FORM DROD-188 instead we increased interval between injections to 3 weeks.  Aranesp 25 mcg given and 3 week return appt set.  Dr Epperson notified.  Iron sats up to 19%    Gfr 13.7/stage 5

## 2019-03-08 NOTE — PROGRESS NOTES
Assessment /Plan     For exam results, see Encounter Report.    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 Medication interaction --> resolved off clonidine    ==> 8/21/2018  Relates bilateral visual disturbances with arcs of light & darkness which pass over minutes  No HA / Syncopal  Non x 1 month 3/11/2019    Short duration  Not typical for migraine activity      Residual Angle Closure  Stable Glaucoma & Patient near target IOP range.  Will continue with same medicines as patient is tolerating well with good adherence    s/p PI OU    Not a VF taker  Fell asleep during testing 2/28/2018  VF not cw OCT or ONH exam      CCT  535 // 537    Target mid teens    Both eyes  Xal q HS    SLE on plaquenil > 5years --> ?? Cystic changes inner Nuclear layer OU  [facility] chart routine with Q & A  [facility] to Follow       NSC OU  Observe      Plan  Follow up [facility] as scheduled  RTC 6 months for IOP  RTC sooner prn with good understanding

## 2019-03-11 ENCOUNTER — OFFICE VISIT (OUTPATIENT)
Dept: OPHTHALMOLOGY | Facility: CLINIC | Age: 74
End: 2019-03-11
Payer: MEDICARE

## 2019-03-11 DIAGNOSIS — H53.433 ARCUATE SCOTOMA OF BOTH EYES: ICD-10-CM

## 2019-03-11 DIAGNOSIS — Z79.899 HIGH RISK MEDICATION USE: ICD-10-CM

## 2019-03-11 DIAGNOSIS — H25.13 NUCLEAR SCLEROSIS OF BOTH EYES: ICD-10-CM

## 2019-03-11 DIAGNOSIS — H40.243 RESIDUAL STAGE OF ANGLE-CLOSURE GLAUCOMA OF BOTH EYES: Primary | ICD-10-CM

## 2019-03-11 DIAGNOSIS — H04.123 INSUFFICIENCY OF TEAR FILM OF BOTH EYES: ICD-10-CM

## 2019-03-11 PROCEDURE — 92014 PR EYE EXAM, EST PATIENT,COMPREHESV: ICD-10-PCS | Mod: HCNC,S$GLB,, | Performed by: OPHTHALMOLOGY

## 2019-03-11 PROCEDURE — 92133 POSTERIOR SEGMENT OCT OPTIC NERVE(OCULAR COHERENCE TOMOGRAPHY) - OU - BOTH EYES: ICD-10-PCS | Mod: HCNC,S$GLB,, | Performed by: OPHTHALMOLOGY

## 2019-03-11 PROCEDURE — 92133 CPTRZD OPH DX IMG PST SGM ON: CPT | Mod: HCNC,S$GLB,, | Performed by: OPHTHALMOLOGY

## 2019-03-11 PROCEDURE — 99999 PR PBB SHADOW E&M-EST. PATIENT-LVL II: CPT | Mod: PBBFAC,HCNC,, | Performed by: OPHTHALMOLOGY

## 2019-03-11 PROCEDURE — 92014 COMPRE OPH EXAM EST PT 1/>: CPT | Mod: HCNC,S$GLB,, | Performed by: OPHTHALMOLOGY

## 2019-03-11 PROCEDURE — 99999 PR PBB SHADOW E&M-EST. PATIENT-LVL II: ICD-10-PCS | Mod: PBBFAC,HCNC,, | Performed by: OPHTHALMOLOGY

## 2019-03-11 RX ORDER — DICYCLOMINE HYDROCHLORIDE 10 MG/1
10 CAPSULE ORAL
COMMUNITY
End: 2019-05-29

## 2019-03-11 RX ORDER — MECLIZINE HYDROCHLORIDE CHEWABLE TABLETS 25 MG/1
32 TABLET, CHEWABLE ORAL 3 TIMES DAILY PRN
COMMUNITY
End: 2019-07-25

## 2019-03-11 RX ORDER — LEVOTHYROXINE SODIUM 50 UG/1
50 TABLET ORAL
COMMUNITY
End: 2019-04-24

## 2019-03-11 RX ORDER — OMEPRAZOLE 20 MG/1
20 CAPSULE, DELAYED RELEASE ORAL
COMMUNITY
End: 2019-05-29

## 2019-03-11 RX ORDER — NYSTATIN 100000 [USP'U]/G
POWDER TOPICAL
COMMUNITY

## 2019-03-11 RX ORDER — HYDROXYCHLOROQUINE SULFATE 200 MG/1
TABLET, FILM COATED ORAL
COMMUNITY
End: 2019-04-11 | Stop reason: SDUPTHER

## 2019-03-11 RX ORDER — CHOLESTYRAMINE 4 G/4.8G
POWDER, FOR SUSPENSION ORAL
COMMUNITY
End: 2019-05-29

## 2019-03-11 RX ORDER — CITALOPRAM 10 MG/1
10 TABLET ORAL
COMMUNITY
End: 2019-04-11 | Stop reason: SDUPTHER

## 2019-03-11 RX ORDER — NITROGLYCERIN 0.4 MG/1
0.4 TABLET SUBLINGUAL
COMMUNITY
End: 2019-03-18 | Stop reason: SDUPTHER

## 2019-03-11 RX ORDER — BUTALBITAL, ASPIRIN, AND CAFFEINE 325; 50; 40 MG/1; MG/1; MG/1
1 CAPSULE ORAL
COMMUNITY

## 2019-03-11 RX ORDER — AMOXICILLIN 500 MG/1
500 CAPSULE ORAL
COMMUNITY
End: 2019-04-24

## 2019-03-11 RX ORDER — ATORVASTATIN CALCIUM 80 MG/1
80 TABLET, FILM COATED ORAL
COMMUNITY
End: 2019-04-11 | Stop reason: SDUPTHER

## 2019-03-11 RX ORDER — LATANOPROST 50 UG/ML
1 SOLUTION/ DROPS OPHTHALMIC
COMMUNITY
End: 2019-04-11 | Stop reason: SDUPTHER

## 2019-03-11 RX ORDER — TRIAMCINOLONE ACETONIDE 5 MG/G
CREAM TOPICAL
COMMUNITY
End: 2020-02-17 | Stop reason: SDUPTHER

## 2019-03-11 RX ORDER — HYDROCODONE BITARTRATE AND ACETAMINOPHEN 5; 325 MG/1; MG/1
1 TABLET ORAL
COMMUNITY
End: 2019-05-29

## 2019-03-11 NOTE — PROGRESS NOTES
Adherence packed for 30 days using [employer] monthly packs:     Morning card:  Asprin 81 mg  Carvedlol 25 mg  Citalopram 20 mg (1&1/2 tabs)  Famotidine 20 mg  Hydroxychloroquine 200 mg  Isosorbide Mononitrate ER 60 mg  Levothyroxine 75 mcg  Nifedipine ER Osmotic 60 mg  Sodium Bicarb 650 mg  Vitamin D 26782 IU (Every Saturdays pocket)        Evening card:  Atorvastatin 80 mg  Carvedilol 25 mg  Nifedipine ER Osmotic 60 mg  Sodium Bicarb 650 mg  Doxasosin 2 mg, take 1 additional tablet if blood pressure is over 160      PRN:  Doxazosin 2 mg

## 2019-03-18 ENCOUNTER — OFFICE VISIT (OUTPATIENT)
Dept: PRIMARY CARE CLINIC | Facility: CLINIC | Age: 74
End: 2019-03-18
Payer: MEDICARE

## 2019-03-18 ENCOUNTER — LAB VISIT (OUTPATIENT)
Dept: LAB | Facility: HOSPITAL | Age: 74
End: 2019-03-18
Attending: INTERNAL MEDICINE
Payer: MEDICARE

## 2019-03-18 VITALS
DIASTOLIC BLOOD PRESSURE: 70 MMHG | WEIGHT: 112.44 LBS | OXYGEN SATURATION: 98 % | SYSTOLIC BLOOD PRESSURE: 144 MMHG | BODY MASS INDEX: 19.2 KG/M2 | HEART RATE: 56 BPM | HEIGHT: 64 IN

## 2019-03-18 DIAGNOSIS — I25.10 CORONARY ARTERY DISEASE DUE TO CALCIFIED CORONARY LESION: ICD-10-CM

## 2019-03-18 DIAGNOSIS — D63.1 ANEMIA IN STAGE 5 CHRONIC KIDNEY DISEASE: ICD-10-CM

## 2019-03-18 DIAGNOSIS — I15.0 RENOVASCULAR HYPERTENSION: ICD-10-CM

## 2019-03-18 DIAGNOSIS — M26.623 BILATERAL TEMPOROMANDIBULAR JOINT PAIN: ICD-10-CM

## 2019-03-18 DIAGNOSIS — N18.5 ANEMIA IN STAGE 5 CHRONIC KIDNEY DISEASE: ICD-10-CM

## 2019-03-18 DIAGNOSIS — Z98.61 POST PTCA: Chronic | ICD-10-CM

## 2019-03-18 DIAGNOSIS — D63.1 ANEMIA OF CHRONIC RENAL FAILURE, STAGE 4 (SEVERE): ICD-10-CM

## 2019-03-18 DIAGNOSIS — I25.84 CORONARY ARTERY DISEASE DUE TO CALCIFIED CORONARY LESION: ICD-10-CM

## 2019-03-18 DIAGNOSIS — M32.19 OTHER SYSTEMIC LUPUS ERYTHEMATOSUS WITH OTHER ORGAN INVOLVEMENT: Chronic | ICD-10-CM

## 2019-03-18 DIAGNOSIS — N18.4 ANEMIA OF CHRONIC RENAL FAILURE, STAGE 4 (SEVERE): ICD-10-CM

## 2019-03-18 DIAGNOSIS — Z13.83 SCREENING FOR RESPIRATORY CONDITION: Primary | ICD-10-CM

## 2019-03-18 LAB
ALBUMIN SERPL BCP-MCNC: 3.4 G/DL
ALP SERPL-CCNC: 66 U/L
ALT SERPL W/O P-5'-P-CCNC: 14 U/L
ANION GAP SERPL CALC-SCNC: 12 MMOL/L
AST SERPL-CCNC: 35 U/L
BILIRUB SERPL-MCNC: 0.3 MG/DL
BUN SERPL-MCNC: 76 MG/DL
CALCIUM SERPL-MCNC: 9.1 MG/DL
CHLORIDE SERPL-SCNC: 99 MMOL/L
CO2 SERPL-SCNC: 25 MMOL/L
CREAT SERPL-MCNC: 3.7 MG/DL
EST. GFR  (AFRICAN AMERICAN): 13 ML/MIN/1.73 M^2
EST. GFR  (NON AFRICAN AMERICAN): 12 ML/MIN/1.73 M^2
GLUCOSE SERPL-MCNC: 89 MG/DL
HCT VFR BLD AUTO: 33.3 %
HGB BLD-MCNC: 10.4 G/DL
IRON SERPL-MCNC: 57 UG/DL
POST FEV1 FVC: NORMAL
POST FEV1: NORMAL
POST FVC: NORMAL
POTASSIUM SERPL-SCNC: 3.8 MMOL/L
PRE FEV1 FVC: 77.92
PRE FEV1: 1.41
PRE FVC: 1.81
PREDICTED FEV1: 80
PREDICTED FVC: 80
PROT SERPL-MCNC: 7.2 G/DL
SATURATED IRON: 22 %
SODIUM SERPL-SCNC: 136 MMOL/L
TOTAL IRON BINDING CAPACITY: 263 UG/DL
TRANSFERRIN SERPL-MCNC: 178 MG/DL

## 2019-03-18 PROCEDURE — 99215 PR OFFICE/OUTPT VISIT, EST, LEVL V, 40-54 MIN: ICD-10-PCS | Mod: HCNC,S$GLB,, | Performed by: INTERNAL MEDICINE

## 2019-03-18 PROCEDURE — 85018 HEMOGLOBIN: CPT | Mod: HCNC

## 2019-03-18 PROCEDURE — 83540 ASSAY OF IRON: CPT | Mod: HCNC

## 2019-03-18 PROCEDURE — 36415 COLL VENOUS BLD VENIPUNCTURE: CPT | Mod: HCNC

## 2019-03-18 PROCEDURE — 3077F SYST BP >= 140 MM HG: CPT | Mod: HCNC,CPTII,S$GLB, | Performed by: INTERNAL MEDICINE

## 2019-03-18 PROCEDURE — 99499 RISK ADDL DX/OHS AUDIT: ICD-10-PCS | Mod: HCNC,S$GLB,, | Performed by: INTERNAL MEDICINE

## 2019-03-18 PROCEDURE — 99215 OFFICE O/P EST HI 40 MIN: CPT | Mod: HCNC,S$GLB,, | Performed by: INTERNAL MEDICINE

## 2019-03-18 PROCEDURE — 1101F PR PT FALLS ASSESS DOC 0-1 FALLS W/OUT INJ PAST YR: ICD-10-PCS | Mod: HCNC,CPTII,S$GLB, | Performed by: INTERNAL MEDICINE

## 2019-03-18 PROCEDURE — 3078F PR MOST RECENT DIASTOLIC BLOOD PRESSURE < 80 MM HG: ICD-10-PCS | Mod: HCNC,CPTII,S$GLB, | Performed by: INTERNAL MEDICINE

## 2019-03-18 PROCEDURE — 3077F PR MOST RECENT SYSTOLIC BLOOD PRESSURE >= 140 MM HG: ICD-10-PCS | Mod: HCNC,CPTII,S$GLB, | Performed by: INTERNAL MEDICINE

## 2019-03-18 PROCEDURE — 80053 COMPREHEN METABOLIC PANEL: CPT | Mod: HCNC

## 2019-03-18 PROCEDURE — 3078F DIAST BP <80 MM HG: CPT | Mod: HCNC,CPTII,S$GLB, | Performed by: INTERNAL MEDICINE

## 2019-03-18 PROCEDURE — 99499 UNLISTED E&M SERVICE: CPT | Mod: HCNC,S$GLB,, | Performed by: INTERNAL MEDICINE

## 2019-03-18 PROCEDURE — 1101F PT FALLS ASSESS-DOCD LE1/YR: CPT | Mod: HCNC,CPTII,S$GLB, | Performed by: INTERNAL MEDICINE

## 2019-03-18 PROCEDURE — 85014 HEMATOCRIT: CPT | Mod: HCNC

## 2019-03-18 RX ORDER — NITROGLYCERIN 0.4 MG/1
0.4 TABLET SUBLINGUAL EVERY 5 MIN PRN
Qty: 25 TABLET | Refills: 3 | Status: SHIPPED | OUTPATIENT
Start: 2019-03-18

## 2019-03-18 NOTE — Clinical Note
Hi Dr Epperson and staff,Patient is due for follow-up. She is willing to travel to [facility] location for an appt, as you may have more availability there.Thanks,WENDY (PCP)

## 2019-03-18 NOTE — PROGRESS NOTES
Primary Care Provider Appointment    Subjective:      Patient ID: Ewelina Arnold is a 73 y.o. female with HTN, CKD, MDD, polypharmacy    Chief Complaint: Hypertension; Chronic Kidney Disease; and Follow-up    Patient with disparate BP readings. Her arm circumference is 21.8cm (which is pediatric cuff), but BP readings in pediatric cuff are high and on adult cuff (which is too large) readings are normal. BP readings from home cuffs compared to clinic cuff, and they are in alignment with the adult cuff from clinic. Doxazosin was recently added to pill packs. She is also taking an extra dose of doxazosin.    Started aranesp infusions for iron def with some improvement in symptoms of fatigue. Followed by dory Lan for appointment.    Patient has heart failure follow-up with Dr Burdick in 4/2019.     Has lip numbness likely related to TMJ. Has a mouth guard (9 years old), but is not using it. Was made by [employer]. Now sees daughters of yasir.    Pills packed as follows:  Adherence packed for 30 days using [employer] monthly packs:     Morning card:  Asprin 81 mg  Carvedlol 25 mg  Citalopram 20 mg (1&1/2 tabs)  Famotidine 20 mg  Hydroxychloroquine 200 mg  Isosorbide Mononitrate ER 60 mg  Levothyroxine 75 mcg  Nifedipine ER Osmotic 60 mg  Sodium Bicarb 650 mg  Vitamin D 93684 IU (Every Saturdays pocket)        Evening card:  Atorvastatin 80 mg  Carvedilol 25 mg  Nifedipine ER Osmotic 60 mg  Sodium Bicarb 650 mg  Doxasosin 2 mg, take 1 additional tablet if blood pressure is over 160      PRN:  Doxazosin 2 mg          Electronically signed by Seth Escudero, PharmD at 3/11/2019  3:40 PM         Past Surgical History:   Procedure Laterality Date    BIOPSY-BONE MARROW Left 10/16/2017    Performed by [facility]illan [facility] at [facility] OR 2ND FLR    CARDIAC CATHETERIZATION  07/27/2011    x1    CHOLECYSTECTOMY  1999    COLON SURGERY  2000 & 2003    partial removal    COLONOSCOPY N/A 9/14/2017    Performed by Jose Steen [facility] at [facility]  ENDO (4TH FLR)    COLONOSCOPY N/A 4/14/2016    [facility]formed [facility] at [facility] ENDO (4TH FLR)    COLONOSCOPY N/A 4/23/2013    Performed by [facility] at [facility] ENDO (4TH FLR)    TALIA-TRANSFORAMINAL Left 11/3/2016    Performed by Brett [facility]    ESOPHAGOGASTRODUODENOSCOPY (EGD) N/A 3/16/2017    [facility] ENDO    EYE SURGERY      HAND SURGERY Bilateral 1996 & 1997    due to carpal tunnel     HERNIA REPAIR      HYSTERECTOMY  1983    INJECTION-STEROID-EPIDURAL-TRANSFORAMINAL Left 1/7/2016    [facility]    Left heart cath Left 12/20/2017    Performed by Chandan Ly [facility] at [facility] CATH LAB    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    OOPHORECTOMY      removed one    Peripheral Iridotomy both eyes  1994    laser angle correction    THYROIDECTOMY, PARTIAL  2006    to remove two nodules and one-half thyroid       Past Medical History:   Diagnosis Date    Acute hypoxemic respiratory failure 4/28/2018    Acute on chronic diastolic congestive heart failure 11/17/2017    Allergy     Anatomical narrow angle glaucoma     Anemia     States diagnosed about a month ago    Aortic atherosclerosis     Arthritis     Cataract     CHF (congestive heart failure)     Chronic kidney disease     Chronic sciatica of left side     Right lower back pain due to sciatica    Coronary artery disease     Depression     Dry eyes     GERD (gastroesophageal reflux disease)     History of colon cancer     Hyperaldosteronism     Hyperlipidemia     Hypertension     Hypothyroidism     Keloid cicatrix     Left ventricular diastolic dysfunction with preserved systolic function     Lupus (systemic lupus erythematosus) 8/17/2012    Malnutrition 5/16/2017    Osteoarthritis of cervical spine 8/17/2012    Osteopenia     PAD (peripheral artery disease)     FRAN (renal artery stenosis)        Social  "History     Socioeconomic History    Marital status: Single     Spouse name: Not on file    Number of children: Not on file    Years of education: Not on file    Highest education level: Not on file   Social Needs    Financial resource strain: Not very hard    Food insecurity - worry: Never true    Food insecurity - inability: Never true    Transportation needs - medical: No    Transportation needs - non-medical: No   Occupational History     Employer: Retired    Tobacco Use    Smoking status: Former Smoker     Packs/day: 1.50     Years: 30.00     Pack years: 45.00     Types: Cigarettes     Start date:      Last attempt to quit: 3/13/1985     Years since quittin.0    Smokeless tobacco: Never Used   Substance and Sexual Activity    Alcohol use: No    Drug use: No    Sexual activity: Not Currently     Partners: Male     Birth control/protection: Abstinence   Other Topics Concern    Are you pregnant or think you may be? No    Breast-feeding No   Social History Narrative    Not on file       Review of Systems   Constitutional: Positive for activity change. Negative for appetite change.   Respiratory: Negative for cough and shortness of breath.    Cardiovascular: Negative for leg swelling.   Genitourinary: Negative for difficulty urinating and dysuria.   Skin: Positive for wound.   Neurological: Negative for dizziness and light-headedness.   Hematological: Bruises/bleeds easily.   Psychiatric/Behavioral: Negative for agitation, behavioral problems, confusion, decreased concentration and dysphoric mood.       Objective:   BP (!) 144/70 (BP Location: Left arm, Patient Position: Sitting, BP Method: Medium (Manual))   Pulse (!) 56   Ht 5' 4" (1.626 m)   Wt 51 kg (112 lb 7 oz)   LMP  (LMP Unknown) Comment: 99  SpO2 98%   BMI 19.30 kg/m²     Physical Exam   Constitutional: She is oriented to person, place, and time. She appears well-developed and well-nourished.   HENT:   Head: Normocephalic and " atraumatic.   Neck: Normal range of motion.   Pulmonary/Chest: Effort normal.   Musculoskeletal: She exhibits deformity. She exhibits no edema.   Resolution of LE nodules   Neurological: She is alert and oriented to person, place, and time.   Psychiatric: She has a normal mood and affect. Her behavior is normal. Thought content normal.   Vitals reviewed.      Lab Results   Component Value Date    WBC 3.18 (L) 12/18/2018    HGB 11.0 (L) 02/22/2019    HCT 35.3 (L) 02/22/2019     12/18/2018    CHOL 181 08/28/2018    TRIG 54 08/28/2018    HDL 66 08/28/2018    ALT 13 01/25/2019    AST 36 01/25/2019     01/25/2019    K 3.7 01/25/2019     01/25/2019    CREATININE 3.6 (H) 01/25/2019    BUN 75 (H) 01/25/2019    CO2 30 (H) 01/25/2019    TSH 1.798 01/25/2019    INR 1.0 09/05/2018    GLUF 79 12/02/2011    HGBA1C 5.2 09/25/2018                 RESULTS: Reviewed labs and images today    Assessment:   73 y.o. female with multiple co-morbid illnesses here to continue work-up of chronic issues notably HTN, CKD, MDD, polypharmacy.     Plan:     Problem List Items Addressed This Visit        ENT    Bilateral temporomandibular joint pain     Jaw numbness and lip tingling, no longer using her mouth guard  · Advised to use mouth guard  · Info given on dentist            Cardiac/Vascular    Post PTCA (Chronic)     Mid LAD on PCI in 2012, angina controlled medically  · Refill nitroglycerine         Relevant Medications    nitroGLYCERIN (NITROSTAT) 0.4 MG SL tablet    Coronary artery disease due to calcified coronary lesion     Last PCI in 2012  · Refill nitro  · Continue statin/APT  · Control BP         Renovascular hypertension     Newly compliant with BP and CHF meds  · Discharged from digital HTN program  · PCP to manage BP  · Pediatric BP cuff with high readings  · Home digital cuffs match clinic adult cuff readings  · Patient advised to continue readings with adult cuffs  · Lasix BID  · Continue coreg 25mg BID,  cardura, isosorbide mononitrate 60mg, nifedipine 60mg BID, cardura 2mg            Immunology/Multi System    Other forms of systemic lupus erythematosus (Chronic)     Diagnosed in 1990s, pos CARYN, SSB. Biopsy showed cutaneous involvment  · Continue plaquenil  · Completed prednisone for erythema nodosum  · F/U Rheum  · PCP to message Dr Ramirez about vision changes         Relevant Orders    Comprehensive metabolic panel       Oncology    Anemia of chronic renal failure, stage 4 (severe)     Followed by Nephrology, PRN iron infusions, arenesp injections  · F/U Nephrology  · Iron infusion PRN  · Continue arenesp           Other Visit Diagnoses     Screening for respiratory condition    -  Primary    Relevant Orders    Mobile Spirometry With/Without Bronchodilator - Markus Yung Only (Completed)          Health Maintenance       Date Due Completion Date    Mammogram 09/11/2019 9/11/2018    High Dose Statin 03/18/2020 3/18/2019    DEXA SCAN 01/08/2022 1/8/2019    Colonoscopy 09/14/2022 9/14/2017    Lipid Panel 08/28/2023 8/28/2018    TETANUS VACCINE 06/29/2026 6/29/2016          Follow-up in about 2 months (around 5/18/2019). Total face-to-face time was 60 min, 50% of this was spent on counseling and coordination of care. The following issues were discussed: HTN, CKD, MDD, polypharmacy    Amarilys Johns MD/MPH  Internal Medicine  [facility]  [phone]

## 2019-03-18 NOTE — ASSESSMENT & PLAN NOTE
Jaw numbness and lip tingling, no longer using her mouth guard  · Advised to use mouth guard  · Info given on dentist

## 2019-03-18 NOTE — ASSESSMENT & PLAN NOTE
Newly compliant with BP and CHF meds  · Discharged from digital HTN program  · PCP to manage BP  · Pediatric BP cuff with high readings  · Home digital cuffs match clinic adult cuff readings  · Patient advised to continue readings with adult cuffs  · Lasix BID  · Continue coreg 25mg BID, cardura, isosorbide mononitrate 60mg, nifedipine 60mg BID, cardura 2mg

## 2019-03-18 NOTE — MEDICAL/APP STUDENT
"Subjective:       Patient ID: Ewelina Arnold is a 73 y.o. female.    Chief Complaint: Hypertension; Chronic Kidney Disease; and Follow-up    Pt says her blood pressures have been "all over the place" - see photos in media for home measurements. Pt cannot feel when she's running high, but feels very lightheaded and tired when she's low, at which point she'll drink water and lie down, which helps. Some HA on the L side near the mandible, no blurry vision. No CP/Palpitations/SOB. No peripheral edema. Pt is due to see cardiologist on 4/11/19.      Pt says her CHF has been well controlled with diet and medications. Thinks she may have gained dry weight, as she has not been symptomatic, no SOB, no swelling.     Lupus stable per pt and rheumatologist, no flares recently.    Pt gets regular eye checks due to HCQ use chronically for lupus.      Review of Systems   Constitutional: Negative for chills, fever and unexpected weight change.   Respiratory: Positive for cough (occasional cough for the last two weeks, worse at night, productive of green sputum, watery, no foam.). Negative for shortness of breath.    Cardiovascular: Negative for chest pain, palpitations and leg swelling.   Gastrointestinal: Negative for abdominal distention, abdominal pain, blood in stool, constipation, diarrhea, nausea and vomiting.        Occasional fecal incontinence with passing gas. Discussed conservative mgmt first I.e. Kegels/pelvic floor strengthening   Genitourinary: Negative for dysuria, frequency, hematuria and urgency.   Skin: Negative for rash.   Neurological: Positive for numbness (tingling in the upper lip).   Hematological: Bruises/bleeds easily.       Objective:      Physical Exam   Constitutional: She is oriented to person, place, and time. She appears well-developed. No distress.   HENT:   Head: Normocephalic and atraumatic.   Mouth/Throat: Oropharynx is clear and moist.   Eyes: Conjunctivae are normal.   Neck: Normal range of " motion. Neck supple. JVD present. No thyromegaly present.   Cardiovascular: Normal rate and regular rhythm.   Murmur (holosystolic blowing murmur, best heard in the tricuspid area) heard.  Pulmonary/Chest: Effort normal and breath sounds normal.   Abdominal: Soft. Bowel sounds are normal. She exhibits no distension. There is no tenderness. There is no guarding.   Musculoskeletal: She exhibits no edema or deformity.   Lymphadenopathy:     She has cervical adenopathy.   Neurological: She is alert and oriented to person, place, and time.   Skin: Skin is warm and dry. No rash noted. She is not diaphoretic.   Psychiatric: She has a normal mood and affect.       Assessment:       No diagnosis found.    Plan:        Ewelina Arnold is a 72 yo M w PMH  HTN, CHF, CKD, SLE, hypothyroidism who is here for her two month f/u    HTN: despite compliance with meds, pt still has HTN several days per week. This may be due to FRAN. Consider consulting CHF specialist to collaborate on med changes for better BP control. Last echo showed no changes from baseline, still has diastolic dysfunction and enlarged ventricles. Cont f/u with cardiology. Difference in cuffs is suspicious, consider bringing in cuffs from home to test.    CHF: cont meds as prescribed, currently well-controlled. Cont low salt/low water diet, daily weights    CKD: f/u with nephrology (Dr. Epperson). Pt was started on EPO therapy after stabilization of BP, but BP now uncontrolled again. Consider holding off on EPO shots until BP stable again.     Tingling of upper lip: Given kidney dysfunction, possible causes include electrolyte imbalance, particularly hypocalcemia. Last CMP in 1/2019 showed normal levels. Redo CMP.    SLE: stable for now. Cont f/u with rheumatologist, cont meds as prescribed      Sarah Gallagher MS4

## 2019-03-18 NOTE — PATIENT INSTRUCTIONS
TODAY:  - handout on how to best take home BP readings  - continue pill packs  - add labs today  - dentist information   + need new mouth guard  - new Palliative NP to the home  - nitroglycerine sent to [employer]  - PCP messaged Dr Epperson about appt

## 2019-03-19 ENCOUNTER — TELEPHONE (OUTPATIENT)
Dept: PRIMARY CARE CLINIC | Facility: CLINIC | Age: 74
End: 2019-03-19

## 2019-03-19 ENCOUNTER — INFUSION (OUTPATIENT)
Dept: INFECTIOUS DISEASES | Facility: HOSPITAL | Age: 74
End: 2019-03-19
Attending: INTERNAL MEDICINE
Payer: MEDICARE

## 2019-03-19 VITALS
BODY MASS INDEX: 19.42 KG/M2 | WEIGHT: 113.75 LBS | DIASTOLIC BLOOD PRESSURE: 71 MMHG | HEIGHT: 64 IN | SYSTOLIC BLOOD PRESSURE: 162 MMHG

## 2019-03-19 DIAGNOSIS — D63.1 ANEMIA OF CHRONIC RENAL FAILURE, STAGE 4 (SEVERE): Primary | ICD-10-CM

## 2019-03-19 DIAGNOSIS — N18.4 ANEMIA OF CHRONIC RENAL FAILURE, STAGE 4 (SEVERE): Primary | ICD-10-CM

## 2019-03-19 PROCEDURE — 63600175 PHARM REV CODE 636 W HCPCS: Mod: HCNC,EC,JG | Performed by: INTERNAL MEDICINE

## 2019-03-19 PROCEDURE — 96372 THER/PROPH/DIAG INJ SC/IM: CPT | Mod: HCNC

## 2019-03-19 RX ADMIN — DARBEPOETIN ALFA 25 MCG: 25 INJECTION, SOLUTION INTRAVENOUS; SUBCUTANEOUS at 11:03

## 2019-03-19 NOTE — TELEPHONE ENCOUNTER
Please let patient know that her kidney function declined slightly and she should see Dr Epperson at her appt.    Otherwise her HG is stable.    Thanks,  KJ

## 2019-03-19 NOTE — PROGRESS NOTES
Hgb 10.4;Hct 33.3    No dose change PER PROTOCOL FORM DROD-188.Aranesp 25 mcg given and 3 week return set.  Gfr 13.7/stage 5

## 2019-03-22 ENCOUNTER — TELEPHONE (OUTPATIENT)
Dept: NEPHROLOGY | Facility: CLINIC | Age: 74
End: 2019-03-22

## 2019-03-22 NOTE — TELEPHONE ENCOUNTER
----- Message from Carolann Maradiaga sent at 3/22/2019 10:22 AM CDT -----  Contact: self [phone]  ..Needs Advice    Reason for call:        Communication Preference:phone    Additional Information:pt states she needs to speak with nurse in regards to her apt please call back to discuss     Pt rescheduled appt

## 2019-03-27 ENCOUNTER — TELEPHONE (OUTPATIENT)
Dept: PRIMARY CARE CLINIC | Facility: CLINIC | Age: 74
End: 2019-03-27

## 2019-03-27 NOTE — TELEPHONE ENCOUNTER
----- Message from Sarah Gallagher sent at 3/27/2019  1:46 PM CDT -----  Letter from [employer] notifying us that pt's abimael will  due to inactivity. As pt no longer needs medications covered, advised by pharmacy to let abimael lapse. Picture of letter uploaded to pt's Media tab.     Thank you   Sarah

## 2019-04-02 ENCOUNTER — OFFICE VISIT (OUTPATIENT)
Dept: NEPHROLOGY | Facility: CLINIC | Age: 74
End: 2019-04-02
Payer: MEDICARE

## 2019-04-02 VITALS
BODY MASS INDEX: 19.42 KG/M2 | SYSTOLIC BLOOD PRESSURE: 120 MMHG | WEIGHT: 113.75 LBS | DIASTOLIC BLOOD PRESSURE: 60 MMHG | HEIGHT: 64 IN

## 2019-04-02 DIAGNOSIS — N18.4 ANEMIA IN STAGE 4 CHRONIC KIDNEY DISEASE: ICD-10-CM

## 2019-04-02 DIAGNOSIS — I15.2 HYPERTENSION DUE TO ENDOCRINE DISORDER: ICD-10-CM

## 2019-04-02 DIAGNOSIS — D63.1 ANEMIA IN STAGE 4 CHRONIC KIDNEY DISEASE: ICD-10-CM

## 2019-04-02 DIAGNOSIS — N18.4 CKD (CHRONIC KIDNEY DISEASE), STAGE IV: Primary | ICD-10-CM

## 2019-04-02 PROCEDURE — 99999 PR PBB SHADOW E&M-EST. PATIENT-LVL II: CPT | Mod: PBBFAC,HCNC,, | Performed by: INTERNAL MEDICINE

## 2019-04-02 PROCEDURE — 99499 RISK ADDL DX/OHS AUDIT: ICD-10-PCS | Mod: HCNC,S$GLB,, | Performed by: INTERNAL MEDICINE

## 2019-04-02 PROCEDURE — 3078F DIAST BP <80 MM HG: CPT | Mod: HCNC,CPTII,S$GLB, | Performed by: INTERNAL MEDICINE

## 2019-04-02 PROCEDURE — 1101F PR PT FALLS ASSESS DOC 0-1 FALLS W/OUT INJ PAST YR: ICD-10-PCS | Mod: HCNC,CPTII,S$GLB, | Performed by: INTERNAL MEDICINE

## 2019-04-02 PROCEDURE — 99214 PR OFFICE/OUTPT VISIT, EST, LEVL IV, 30-39 MIN: ICD-10-PCS | Mod: HCNC,S$GLB,, | Performed by: INTERNAL MEDICINE

## 2019-04-02 PROCEDURE — 99499 UNLISTED E&M SERVICE: CPT | Mod: HCNC,S$GLB,, | Performed by: INTERNAL MEDICINE

## 2019-04-02 PROCEDURE — 3078F PR MOST RECENT DIASTOLIC BLOOD PRESSURE < 80 MM HG: ICD-10-PCS | Mod: HCNC,CPTII,S$GLB, | Performed by: INTERNAL MEDICINE

## 2019-04-02 PROCEDURE — 1101F PT FALLS ASSESS-DOCD LE1/YR: CPT | Mod: HCNC,CPTII,S$GLB, | Performed by: INTERNAL MEDICINE

## 2019-04-02 PROCEDURE — 3074F PR MOST RECENT SYSTOLIC BLOOD PRESSURE < 130 MM HG: ICD-10-PCS | Mod: HCNC,CPTII,S$GLB, | Performed by: INTERNAL MEDICINE

## 2019-04-02 PROCEDURE — 99214 OFFICE O/P EST MOD 30 MIN: CPT | Mod: HCNC,S$GLB,, | Performed by: INTERNAL MEDICINE

## 2019-04-02 PROCEDURE — 3074F SYST BP LT 130 MM HG: CPT | Mod: HCNC,CPTII,S$GLB, | Performed by: INTERNAL MEDICINE

## 2019-04-02 PROCEDURE — 99999 PR PBB SHADOW E&M-EST. PATIENT-LVL II: ICD-10-PCS | Mod: PBBFAC,HCNC,, | Performed by: INTERNAL MEDICINE

## 2019-04-02 NOTE — PROGRESS NOTES
Subjective:       Patient ID: Ewelina Arnold is a 73 y.o. Black or  female who presents for re-evaluation of progression of Chronic Kidney Disease    HPI    Ms. 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  Patient previously admitted for hypoxemic respiratory failure from acute decompensated heart failure, diuresed with subsequent hypotension, therefore diuretic regimen adjusted prior to discharge to SNF, then directed to use as indicated for dry weight of ~107kg.  Patient reports daily weights have been relatively stable at 108-110lbs on current regimen, suspects weight gain with increase appetite and oral intake; she denies shortness of breath.  She reports blood pressure improved and relatively stable since last visit, now 110-140's systolic, though occasionally up to 170's systolic.  She otherwise denies any fever, chest pain, shortness of breath, abdominal pain, dysuria/hematuria.     Review of Systems   Constitutional: Negative for appetite change, fatigue and fever.   Respiratory: Negative for chest tightness and shortness of breath.    Cardiovascular: Negative for chest pain and leg swelling.   Gastrointestinal: Negative for abdominal pain, constipation, diarrhea, nausea and vomiting.   Genitourinary: Negative for difficulty urinating, dysuria, flank pain, frequency, hematuria and urgency.   Musculoskeletal: Negative for arthralgias, joint swelling and myalgias.   Skin: Negative for rash and wound.   Neurological: Negative for dizziness, weakness and light-headedness.   All other systems reviewed and are negative.      Objective:      Physical Exam   Constitutional: She appears well-developed and well-nourished.   Cardiovascular: Normal rate, regular rhythm and normal heart sounds.  Exam reveals no gallop and no friction rub.   No murmur heard.  Pulmonary/Chest: Effort normal and breath sounds normal. No respiratory distress. She has no wheezes. She has no rales.   Abdominal: Soft. Bowel sounds are normal. There is no tenderness.   Musculoskeletal: She exhibits no edema.   Neurological: She is alert.   Skin: Skin is warm and dry. No rash noted. No erythema.   Psychiatric: She has a normal mood and affect.       Assessment:       1. CKD (chronic kidney disease), stage IV    2. Anemia in stage 4 chronic kidney disease    3. Hypertension due to endocrine disorder    4. Solitary kidney        Plan:     Ms. 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  Patient previously with acute kidney injury, likely due to cardiomyopathy and moderate volume depletion with diuresis, creatinine improved prior to and since discharge.  Will trend symptoms/creatinine closely, will phone review BP diary and daily weights.  Encouraged oral/fluid intake for now, suspect dry weight ~108-111lbs, further recommendations pending results of above, patient voiced understanding of above, agreeable to plan as noted.    - Anemia: Hgb at goal, given IV iron, continue darbepoetin in Anemia clinic  - Bone/mineral metabolism: patient with secondary hyperparathyroidism, PTH at goal for stage CKD, patient on ergocalciferol for VitD deficiency    Return to clinic in 8-12 weeks with renal/heme panel, iron/TIBC/ferritin, urinalysis/culture, urine protein/creatinine ratio prior to next visit

## 2019-04-04 ENCOUNTER — OFFICE VISIT (OUTPATIENT)
Dept: OPHTHALMOLOGY | Facility: CLINIC | Age: 74
End: 2019-04-04
Payer: MEDICARE

## 2019-04-04 VITALS — DIASTOLIC BLOOD PRESSURE: 56 MMHG | HEART RATE: 52 BPM | SYSTOLIC BLOOD PRESSURE: 125 MMHG

## 2019-04-04 DIAGNOSIS — M32.9 SYSTEMIC LUPUS ERYTHEMATOSUS, UNSPECIFIED SLE TYPE, UNSPECIFIED ORGAN INVOLVEMENT STATUS: ICD-10-CM

## 2019-04-04 DIAGNOSIS — H40.243 RESIDUAL STAGE OF ANGLE-CLOSURE GLAUCOMA OF BOTH EYES: ICD-10-CM

## 2019-04-04 DIAGNOSIS — H25.13 NUCLEAR SCLEROSIS OF BOTH EYES: ICD-10-CM

## 2019-04-04 DIAGNOSIS — Z79.899 LONG-TERM USE OF PLAQUENIL: Primary | ICD-10-CM

## 2019-04-04 PROCEDURE — 92014 COMPRE OPH EXAM EST PT 1/>: CPT | Mod: HCNC,S$GLB,, | Performed by: OPHTHALMOLOGY

## 2019-04-04 PROCEDURE — 92134 POSTERIOR SEGMENT OCT RETINA (OCULAR COHERENCE TOMOGRAPHY)-BOTH EYES: ICD-10-PCS | Mod: HCNC,S$GLB,, | Performed by: OPHTHALMOLOGY

## 2019-04-04 PROCEDURE — 92014 PR EYE EXAM, EST PATIENT,COMPREHESV: ICD-10-PCS | Mod: HCNC,S$GLB,, | Performed by: OPHTHALMOLOGY

## 2019-04-04 PROCEDURE — 99999 PR PBB SHADOW E&M-EST. PATIENT-LVL III: CPT | Mod: PBBFAC,HCNC,, | Performed by: OPHTHALMOLOGY

## 2019-04-04 PROCEDURE — 92134 CPTRZ OPH DX IMG PST SGM RTA: CPT | Mod: HCNC,S$GLB,, | Performed by: OPHTHALMOLOGY

## 2019-04-04 PROCEDURE — 99999 PR PBB SHADOW E&M-EST. PATIENT-LVL III: ICD-10-PCS | Mod: PBBFAC,HCNC,, | Performed by: OPHTHALMOLOGY

## 2019-04-04 NOTE — PROGRESS NOTES
Adherence packed for 28 days using Dispill:     Morning card:  Asprin 81 mg  Carvedlol 25 mg  Citalopram 20 mg (1&1/2 tabs)  Famotidine 20 mg  Hydroxychloroquine 200 mg  Isosorbide Mononitrate ER 60 mg  Levothyroxine 75 mcg  Nifedipine ER Osmotic 60 mg  Sodium Bicarb 650 mg  Vitamin D 99662 IU (Every Saturdays pocket)        Evening card:  Atorvastatin 80 mg  Carvedilol 25 mg  Nifedipine ER Osmotic 60 mg  Sodium Bicarb 650 mg  Doxasosin 2 mg, take 1 additional tablet if blood pressure is over 160      PRN:  Doxazosin 2 mg

## 2019-04-04 NOTE — Clinical Note
I think there are enough changes on testing to warrant diagnosing Plaquenil maculopathy.  I discussed with patient.  I think we should transition her to a different medication.   Please let me know if I can help further

## 2019-04-06 NOTE — PROGRESS NOTES
HPI     DLS 03/11/19 by Dr. PARKER Mccray MD          05/11/18 by Dr. PARKER Sierra MD    73 YOF here today for her 6mo Plaquenil evaluation.     Pt reports: seeing white ribbons in peripheral vision OU all the time and   even when my eye are closed I see the images. Not assoc with HA.    Otherwise vision is good.  No flashes/pain     Decreased Plaquenil dose from 400 to 200 since last visit    +headaches at times  -blurred vision  -eye pain    Eye Med: Latanoprost QHS OU      POHX:   1. 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 High risk medication use - Both Eyes   3. Nuclear sclerosis of both eyes   4. Positive dysphotopsia   5. Insufficiency of tear film of both eyes   6. Arcuate scotoma of both eyes           Last edited by Bryn Sierra MD on 4/6/2019  2:03 PM. (History)          Reading SDOCT:   OD: good quality, good contour, IS/OS intact centrally but thin eccentrically, appears wose since 5/11/18 scan  OS: good quality, good contour, IS/OS intact centrally but thin eccentrically, appears wose since 5/11/18 scan    Fundus Autofluorescence  OD: No bull's eye but appears inc in more peripheral hypoautofluorescence  OS: No bull's eye but appears inc in more peripheral hypoautofluorescence    Assessment /Plan     For exam results, see Encounter Report.    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  Recommend to continue to observe cataracts    Positive dysphotopsia.  Evaluated by Dr Collazo in past.  Recommend PMD eval as recommended by Dr Collazo    VF changes diff to interpret with gl and per JDN note pt not reliable VF  tasha    High risk dose Plaquenil.  Good Va but OCT changes c/w maculopathy today.  Changes not detectable on Autofluorescence.    Called to d/w pt.  Recommend to stop Plaquenil and transition to another med.  Will d/w Dr Ramirez.    RTC with me 3 months, sooner PRN  Keep f/u with JDN as planned

## 2019-04-08 ENCOUNTER — TELEPHONE (OUTPATIENT)
Dept: PRIMARY CARE CLINIC | Facility: CLINIC | Age: 74
End: 2019-04-08

## 2019-04-08 NOTE — TELEPHONE ENCOUNTER
[facility] office called about duke's low BP. Please call her to see how she's doing and also instruct her to drink water (not soda) if she's dehydrated.    How much doxazosin is she taking? If she know what it looks like, she can take that out of her pill pack for the time being.    Also how much lasix is she taking?    Thanks,  KJ

## 2019-04-09 ENCOUNTER — TELEPHONE (OUTPATIENT)
Dept: PRIMARY CARE CLINIC | Facility: CLINIC | Age: 74
End: 2019-04-09

## 2019-04-09 NOTE — TELEPHONE ENCOUNTER
"Spoke with patient, blood pressure yesterday was 111/52@3pm, 120/56@4pm, 132/65@5pm, and at bedtime 152/66. This am(730) bp was 127/59. States she has been taking the cardura but not when its "low", yesterday evening she took a "piece" of it. She is taking lasix twice daily. The directions state "once daily if SOB or weight gain". Patient also stated she will have a new NP visiting her today(Sabiha). She's waiting for her now(appointment time 2p). Please advise on the lasix?  "

## 2019-04-11 ENCOUNTER — HOSPITAL ENCOUNTER (OUTPATIENT)
Dept: CARDIOLOGY | Facility: CLINIC | Age: 74
Discharge: HOME | End: 2019-04-11
Attending: INTERNAL MEDICINE
Payer: MEDICARE

## 2019-04-11 ENCOUNTER — INFUSION (OUTPATIENT)
Dept: INFECTIOUS DISEASES | Facility: HOSPITAL | Age: 74
End: 2019-04-11
Attending: INTERNAL MEDICINE
Payer: MEDICARE

## 2019-04-11 ENCOUNTER — OFFICE VISIT (OUTPATIENT)
Dept: TRANSPLANT | Facility: CLINIC | Age: 74
End: 2019-04-11
Payer: MEDICARE

## 2019-04-11 VITALS
HEIGHT: 64 IN | BODY MASS INDEX: 19.29 KG/M2 | WEIGHT: 113 LBS | SYSTOLIC BLOOD PRESSURE: 120 MMHG | DIASTOLIC BLOOD PRESSURE: 80 MMHG | HEART RATE: 63 BPM

## 2019-04-11 VITALS
WEIGHT: 117.31 LBS | BODY MASS INDEX: 19.54 KG/M2 | DIASTOLIC BLOOD PRESSURE: 54 MMHG | SYSTOLIC BLOOD PRESSURE: 115 MMHG | HEIGHT: 65 IN

## 2019-04-11 VITALS
DIASTOLIC BLOOD PRESSURE: 45 MMHG | BODY MASS INDEX: 19.5 KG/M2 | SYSTOLIC BLOOD PRESSURE: 100 MMHG | HEIGHT: 65 IN | HEART RATE: 54 BPM | WEIGHT: 117.06 LBS

## 2019-04-11 DIAGNOSIS — I1A.0 RESISTANT HYPERTENSION: Primary | ICD-10-CM

## 2019-04-11 DIAGNOSIS — I35.0 AORTIC STENOSIS, MODERATE: ICD-10-CM

## 2019-04-11 DIAGNOSIS — I15.0 RENOVASCULAR HYPERTENSION: ICD-10-CM

## 2019-04-11 DIAGNOSIS — D63.1 ANEMIA IN STAGE 5 CHRONIC KIDNEY DISEASE: Primary | ICD-10-CM

## 2019-04-11 DIAGNOSIS — D63.1 ANEMIA OF CHRONIC RENAL FAILURE, STAGE 4 (SEVERE): ICD-10-CM

## 2019-04-11 DIAGNOSIS — N18.4 ANEMIA OF CHRONIC RENAL FAILURE, STAGE 4 (SEVERE): ICD-10-CM

## 2019-04-11 DIAGNOSIS — I13.0 CARDIORENAL SYNDROME, STAGE 1-4 OR UNSPECIFIED CHRONIC KIDNEY DISEASE, WITH HEART FAILURE: ICD-10-CM

## 2019-04-11 DIAGNOSIS — I1A.0 RESISTANT HYPERTENSION: ICD-10-CM

## 2019-04-11 DIAGNOSIS — N18.5 ANEMIA IN STAGE 5 CHRONIC KIDNEY DISEASE: Primary | ICD-10-CM

## 2019-04-11 LAB
ASCENDING AORTA: 2.49 CM
AV INDEX (PROSTH): 0.34
AV MEAN GRADIENT: 40.53 MMHG
AV PEAK GRADIENT: 69.89 MMHG
AV VALVE AREA: 1.24 CM2
AV VELOCITY RATIO: 0.31
BSA FOR ECHO PROCEDURE: 1.52 M2
CV ECHO LV RWT: 0.59 CM
DOP CALC AO PEAK VEL: 4.18 M/S
DOP CALC AO VTI: 93.87 CM
DOP CALC LVOT AREA: 3.7 CM2
DOP CALC LVOT DIAMETER: 2.17 CM
DOP CALC LVOT PEAK VEL: 1.29 M/S
DOP CALC LVOT STROKE VOLUME: 116.62 CM3
DOP CALCLVOT PEAK VEL VTI: 31.55 CM
E WAVE DECELERATION TIME: 179.56 MSEC
E/A RATIO: 1.2
E/E' RATIO: 25.25
ECHO LV POSTERIOR WALL: 1.4 CM (ref 0.6–1.1)
FRACTIONAL SHORTENING: 38 % (ref 28–44)
INTERVENTRICULAR SEPTUM: 1.3 CM (ref 0.6–1.1)
IVRT: 0.05 MSEC
LA MAJOR: 6.35 CM
LA MINOR: 6.24 CM
LA WIDTH: 3.35 CM
LEFT ATRIUM SIZE: 4.7 CM
LEFT ATRIUM VOLUME INDEX: 54.9 ML/M2
LEFT ATRIUM VOLUME: 84.24 CM3
LEFT INTERNAL DIMENSION IN SYSTOLE: 2.95 CM (ref 2.1–4)
LEFT VENTRICLE DIASTOLIC VOLUME INDEX: 67.37 ML/M2
LEFT VENTRICLE DIASTOLIC VOLUME: 103.38 ML
LEFT VENTRICLE MASS INDEX: 164.9 G/M2
LEFT VENTRICLE SYSTOLIC VOLUME INDEX: 21.9 ML/M2
LEFT VENTRICLE SYSTOLIC VOLUME: 33.59 ML
LEFT VENTRICULAR INTERNAL DIMENSION IN DIASTOLE: 4.72 CM (ref 3.5–6)
LEFT VENTRICULAR MASS: 253 G
LV LATERAL E/E' RATIO: 25.25
LV SEPTAL E/E' RATIO: 25.25
MV PEAK A VEL: 0.84 M/S
MV PEAK E VEL: 1.01 M/S
PISA TR MAX VEL: 3.02 M/S
PULM VEIN S/D RATIO: 1.22
PV PEAK D VEL: 0.41 M/S
PV PEAK S VEL: 0.5 M/S
PV PEAK VELOCITY: 1.4 CM/S
RA MAJOR: 5.11 CM
RA PRESSURE: 3 MMHG
RA WIDTH: 2.96 CM
RIGHT VENTRICULAR END-DIASTOLIC DIMENSION: 2.76 CM
RV TISSUE DOPPLER FREE WALL SYSTOLIC VELOCITY 1 (APICAL 4 CHAMBER VIEW): 14.07 M/S
SINUS: 2.68 CM
STJ: 2.07 CM
TDI LATERAL: 0.04
TDI SEPTAL: 0.04
TDI: 0.04
TR MAX PG: 36.48 MMHG
TRICUSPID ANNULAR PLANE SYSTOLIC EXCURSION: 2.61 CM
TV REST PULMONARY ARTERY PRESSURE: 39 MMHG

## 2019-04-11 PROCEDURE — 99999 PR PBB SHADOW E&M-EST. PATIENT-LVL III: ICD-10-PCS | Mod: PBBFAC,HCNC,, | Performed by: INTERNAL MEDICINE

## 2019-04-11 PROCEDURE — 96372 THER/PROPH/DIAG INJ SC/IM: CPT | Mod: HCNC

## 2019-04-11 PROCEDURE — 3078F PR MOST RECENT DIASTOLIC BLOOD PRESSURE < 80 MM HG: ICD-10-PCS | Mod: HCNC,CPTII,S$GLB, | Performed by: INTERNAL MEDICINE

## 2019-04-11 PROCEDURE — 93306 TRANSTHORACIC ECHO (TTE) COMPLETE (CUPID ONLY): ICD-10-PCS | Mod: HCNC,S$GLB,, | Performed by: INTERNAL MEDICINE

## 2019-04-11 PROCEDURE — 3074F SYST BP LT 130 MM HG: CPT | Mod: HCNC,CPTII,S$GLB, | Performed by: INTERNAL MEDICINE

## 2019-04-11 PROCEDURE — 1101F PT FALLS ASSESS-DOCD LE1/YR: CPT | Mod: HCNC,CPTII,S$GLB, | Performed by: INTERNAL MEDICINE

## 2019-04-11 PROCEDURE — 99215 PR OFFICE/OUTPT VISIT, EST, LEVL V, 40-54 MIN: ICD-10-PCS | Mod: HCNC,S$GLB,, | Performed by: INTERNAL MEDICINE

## 2019-04-11 PROCEDURE — 3078F DIAST BP <80 MM HG: CPT | Mod: HCNC,CPTII,S$GLB, | Performed by: INTERNAL MEDICINE

## 2019-04-11 PROCEDURE — 1101F PR PT FALLS ASSESS DOC 0-1 FALLS W/OUT INJ PAST YR: ICD-10-PCS | Mod: HCNC,CPTII,S$GLB, | Performed by: INTERNAL MEDICINE

## 2019-04-11 PROCEDURE — 99999 PR PBB SHADOW E&M-EST. PATIENT-LVL III: CPT | Mod: PBBFAC,HCNC,, | Performed by: INTERNAL MEDICINE

## 2019-04-11 PROCEDURE — 3074F PR MOST RECENT SYSTOLIC BLOOD PRESSURE < 130 MM HG: ICD-10-PCS | Mod: HCNC,CPTII,S$GLB, | Performed by: INTERNAL MEDICINE

## 2019-04-11 PROCEDURE — 93306 TTE W/DOPPLER COMPLETE: CPT | Mod: HCNC,S$GLB,, | Performed by: INTERNAL MEDICINE

## 2019-04-11 PROCEDURE — 99215 OFFICE O/P EST HI 40 MIN: CPT | Mod: HCNC,S$GLB,, | Performed by: INTERNAL MEDICINE

## 2019-04-11 PROCEDURE — 63600175 PHARM REV CODE 636 W HCPCS: Mod: HCNC,JG | Performed by: INTERNAL MEDICINE

## 2019-04-11 RX ORDER — DOXAZOSIN 2 MG/1
2 TABLET ORAL NIGHTLY PRN
Qty: 90 TABLET | Refills: 3 | Status: SHIPPED | OUTPATIENT
Start: 2019-04-11 | End: 2019-06-24

## 2019-04-11 RX ADMIN — DARBEPOETIN ALFA 25 MCG: 25 INJECTION, SOLUTION INTRAVENOUS; SUBCUTANEOUS at 10:04

## 2019-04-11 NOTE — PATIENT INSTRUCTIONS
Stop doxazosin for now  IF morning  BP > 150 for two days in row, restart doxazosin    ECHO   · Moderate aortic valve stenosis. (NOT severe Aortic stenosis (closure)   · Aortic valve area is 1.24 cm2 (< 1 cm2 is severe)

## 2019-04-11 NOTE — PROGRESS NOTES
"Subjective:    Patient ID:  Ewelina Arnold is a 73 y.o. female who presents for follow-up of Congestive Heart Failure (f/u w/ echo)      HPI   Moderate to severe aortic stenosis (PABLITO 1.24 / mean gradient 41 / peak velocity 4.18 April 2019) who seems better after Oct 2018 admission for fluid retention  She has not been readmitted since that time. Today she has no edema with a stable clinic weight of 109 to 112  She is meticulously with her daily weight / BP reading which has helped tremendously Reported some unsteadiness to a provider on 4/9/19 who recommended holding her coreg / nifedipine x 24 hrs.  She resumed her coreg / nifedipine 4/101/19 and did not notice the unsteadiness.  Continues to participate in senior exercise.  Reports same baseline PEDRO after two blocks     Review of Systems   Constitution: Negative for decreased appetite, weight gain and weight loss.   Cardiovascular: Negative for chest pain, dyspnea on exertion, leg swelling, near-syncope, orthopnea and palpitations.   Respiratory: Negative for cough and shortness of breath.    Musculoskeletal: Negative for myalgias.   Gastrointestinal: Negative for jaundice.        Objective:    Physical Exam   Constitutional: She appears well-developed and well-nourished. No distress.   BP (!) 100/45 (BP Location: Left arm, Patient Position: Sitting, BP Method: Medium (Automatic))   Pulse (!) 54   Ht 5' 4.5" (1.638 m)   Wt 53.1 kg (117 lb 1 oz)   LMP  (LMP Unknown) Comment: 99  BMI 19.78 kg/m²      HENT:   Head: Normocephalic and atraumatic. Head is without abrasion and without contusion.   Right Ear: External ear normal.   Left Ear: External ear normal.   Nose: Nose normal. No epistaxis.   Mouth/Throat: Oropharynx is clear and moist. Mucous membranes are not cyanotic.   Eyes: Pupils are equal, round, and reactive to light. Conjunctivae and EOM are normal.   Neck: Normal range of motion. Neck supple. No tracheal deviation present.   Cardiovascular: Normal " rate, regular rhythm and normal pulses. Exam reveals no gallop.   Murmur heard.   Harsh midsystolic murmur is present with a grade of 2/6 at the upper right sternal border radiating to the neck. Consistent with moderate AS   Pulmonary/Chest: Effort normal and breath sounds normal. No stridor. No respiratory distress. She has no wheezes.   Abdominal: Soft. Normal appearance, normal aorta and bowel sounds are normal. She exhibits no distension. There is no tenderness.   Musculoskeletal: She exhibits no edema or tenderness.   Neurological: She is alert. She has normal strength and normal reflexes. She exhibits normal muscle tone.   Skin: Skin is warm. No rash noted. No erythema.   Psychiatric: She has a normal mood and affect. Her speech is normal and behavior is normal. Judgment and thought content normal. Cognition and memory are normal.     BMP  Lab Results   Component Value Date     (L) 04/11/2019    K 3.6 04/11/2019    CL 95 04/11/2019    CO2 29 04/11/2019    BUN 91 (H) 04/11/2019    CREATININE 4.4 (H) 04/11/2019    CALCIUM 9.0 04/11/2019    ANIONGAP 10 04/11/2019    ESTGFRAFRICA 10.8 (A) 04/11/2019    EGFRNONAA 9.3 (A) 04/11/2019           Assessment:       1. Resistant hypertension    2. Cardiorenal syndrome, stage 1-4 or unspecified chronic kidney disease, with heart failure    3. Anemia of chronic renal failure, stage 4 (severe)    4. Aortic stenosis, moderate    5. Renovascular hypertension         Plan:       Anemia of chronic renal failure, stage 4 (severe)  Followed by Nephrology, PRN iron infusions, arenesp injections  · F/U Nephrology  · Iron infusion PRN  · Continue arenesp    Aortic stenosis, moderate  Aortic regurg and stenosis on recent echo, not eligible for TAVR (will not improve aortic valve function)  Echo today unchanged will follow annually with echo     Renovascular hypertension  Newly compliant with BP and CHF meds  · Discharged from digital HTN program  ? PCP to manage BP  · Lasix qd  prn weight gain   · Continue coreg 25mg BID,, isosorbide mononitrate 60mg, nifedipine 60mg BID,  · Stopped cardura due to unsteadiness  / good BP control .  IF morning  BP > 150 for two days in row, restart doxazosin

## 2019-04-11 NOTE — ASSESSMENT & PLAN NOTE
Aortic regurg and stenosis on recent echo, not eligible for TAVR (will not improve aortic valve function)  Echo today unchanged will follow annually with echo

## 2019-04-11 NOTE — PROGRESS NOTES
Hgb 9.9;Hct 31.3    No dose change PER PROTOCOL FORM DROD-188.Aranesp 25 mcg given and 3 week return set. Iron sats down to 16% patient feeling drained. She is not on Iron supplements. Dr Epperson notified and Iron food list given.  Gfr 13./stage 5

## 2019-04-11 NOTE — ASSESSMENT & PLAN NOTE
Newly compliant with BP and CHF meds  · Discharged from digital HTN program  ? PCP to manage BP  · Lasix qd prn weight gain   · Continue coreg 25mg BID,, isosorbide mononitrate 60mg, nifedipine 60mg BID,  · Stopped cardura due to unsteadiness  / good BP control .  IF morning  BP > 150 for two days in row, restart doxazosin

## 2019-04-16 ENCOUNTER — TELEPHONE (OUTPATIENT)
Dept: RHEUMATOLOGY | Facility: CLINIC | Age: 74
End: 2019-04-16

## 2019-04-16 NOTE — TELEPHONE ENCOUNTER
Called patient and told her to have the physician fax over her plaquenil exam results, and that Dr. Ramirez is out of the office until 4/22/19.  Patient verbalizes understanding.

## 2019-04-16 NOTE — TELEPHONE ENCOUNTER
----- Message from Karyn Reece sent at 4/16/2019  3:28 PM CDT -----  Contact: self  Pt stated that she would like a call back regarding changing her hydroxychloroquine (PLAQUENIL) 200 mg tablet per Dr. Ambrose    Please call pt @ [phone]    Thank you

## 2019-04-22 ENCOUNTER — TELEPHONE (OUTPATIENT)
Dept: RHEUMATOLOGY | Facility: CLINIC | Age: 74
End: 2019-04-22

## 2019-04-22 NOTE — TELEPHONE ENCOUNTER
Discussed eye visit findings    I advised her to stop HCQ now    Please schedule standing lab  for me in late June and RTC me in July

## 2019-04-22 NOTE — TELEPHONE ENCOUNTER
----- Message from Bryn Sierra MD sent at 4/16/2019  7:21 AM CDT -----  I think there are enough changes on testing to warrant diagnosing Plaquenil maculopathy.  I discussed with patient.  I think we should transition her to a different medication.   Please let me know if I can help further

## 2019-04-24 ENCOUNTER — OFFICE VISIT (OUTPATIENT)
Dept: CARDIOLOGY | Facility: CLINIC | Age: 74
End: 2019-04-24
Payer: MEDICARE

## 2019-04-24 ENCOUNTER — TELEPHONE (OUTPATIENT)
Dept: OPHTHALMOLOGY | Facility: CLINIC | Age: 74
End: 2019-04-24

## 2019-04-24 VITALS
OXYGEN SATURATION: 99 % | WEIGHT: 117.75 LBS | BODY MASS INDEX: 19.62 KG/M2 | HEIGHT: 65 IN | DIASTOLIC BLOOD PRESSURE: 76 MMHG | HEART RATE: 63 BPM | SYSTOLIC BLOOD PRESSURE: 168 MMHG

## 2019-04-24 DIAGNOSIS — I10 ESSENTIAL HYPERTENSION: Primary | ICD-10-CM

## 2019-04-24 DIAGNOSIS — E78.5 DYSLIPIDEMIA: ICD-10-CM

## 2019-04-24 DIAGNOSIS — N18.4 CKD (CHRONIC KIDNEY DISEASE), STAGE IV: ICD-10-CM

## 2019-04-24 DIAGNOSIS — I51.7 LVH (LEFT VENTRICULAR HYPERTROPHY): ICD-10-CM

## 2019-04-24 DIAGNOSIS — I25.84 CORONARY ARTERY DISEASE DUE TO CALCIFIED CORONARY LESION: ICD-10-CM

## 2019-04-24 DIAGNOSIS — I25.10 CORONARY ARTERY DISEASE DUE TO CALCIFIED CORONARY LESION: ICD-10-CM

## 2019-04-24 DIAGNOSIS — I35.0 SEVERE CALCIFIC AORTIC STENOSIS: ICD-10-CM

## 2019-04-24 PROCEDURE — 3077F SYST BP >= 140 MM HG: CPT | Mod: HCNC,CPTII,S$GLB, | Performed by: INTERNAL MEDICINE

## 2019-04-24 PROCEDURE — 99214 PR OFFICE/OUTPT VISIT, EST, LEVL IV, 30-39 MIN: ICD-10-PCS | Mod: HCNC,S$GLB,, | Performed by: INTERNAL MEDICINE

## 2019-04-24 PROCEDURE — 1101F PT FALLS ASSESS-DOCD LE1/YR: CPT | Mod: HCNC,CPTII,S$GLB, | Performed by: INTERNAL MEDICINE

## 2019-04-24 PROCEDURE — 3077F PR MOST RECENT SYSTOLIC BLOOD PRESSURE >= 140 MM HG: ICD-10-PCS | Mod: HCNC,CPTII,S$GLB, | Performed by: INTERNAL MEDICINE

## 2019-04-24 PROCEDURE — 99999 PR PBB SHADOW E&M-EST. PATIENT-LVL IV: CPT | Mod: PBBFAC,HCNC,,

## 2019-04-24 PROCEDURE — 3078F DIAST BP <80 MM HG: CPT | Mod: HCNC,CPTII,S$GLB, | Performed by: INTERNAL MEDICINE

## 2019-04-24 PROCEDURE — 3078F PR MOST RECENT DIASTOLIC BLOOD PRESSURE < 80 MM HG: ICD-10-PCS | Mod: HCNC,CPTII,S$GLB, | Performed by: INTERNAL MEDICINE

## 2019-04-24 PROCEDURE — 1101F PR PT FALLS ASSESS DOC 0-1 FALLS W/OUT INJ PAST YR: ICD-10-PCS | Mod: HCNC,CPTII,S$GLB, | Performed by: INTERNAL MEDICINE

## 2019-04-24 PROCEDURE — 99214 OFFICE O/P EST MOD 30 MIN: CPT | Mod: HCNC,S$GLB,, | Performed by: INTERNAL MEDICINE

## 2019-04-24 PROCEDURE — 99999 PR PBB SHADOW E&M-EST. PATIENT-LVL IV: ICD-10-PCS | Mod: PBBFAC,HCNC,,

## 2019-04-24 NOTE — TELEPHONE ENCOUNTER
----- Message from Carmen Barriga sent at 4/24/2019 12:01 PM CDT -----  Contact: Ewelina  Needs Advice    Reason for call: pt is calling to speak with someone concerning her eye drops (latanoprost). Pt stated on her last office visit she thought Dr. Mccray wanted her to stop taking the drops.         Communication Preference: [phone]    Additional Information:

## 2019-04-24 NOTE — ASSESSMENT & PLAN NOTE
-Patient is too frail for TAVR now given the renal function and very likely allen of going into dialysis.   -Patient should be seen by her nephrologist and have discussion about dialysis initiation. She will end up going to dialysis if she undergoes SAVR or TAVR, We recommend that her nephrologist evaluate her and start her on HD before her valve procedure so we can do her valve replacement afterwards.

## 2019-04-24 NOTE — PROGRESS NOTES
Subjective:    Patient ID:  Ewelina Arnold is a 73 y.o. female who presents for evaluation of severe Aortic Stenosis       Referring Physician: Dr SUKUMAR Burdick    HPI    She has medical history significant for HTN, DLPD, CAD s/p LAD PCI in 2011 using 2.5x 22 mm KIT who presents for evaluation of symptomatic severe AS. She was evaluated 6 months ago and at that time she was moderate-severe AS with symptoms. She was treated medically but symptoms did not improve. She was recently seen by Dr Burdick who ordered 2D echo which showed normal LVEF, LVH, severe AS, mid-mod AI, and enlarged LA.   Today patient reports NYHA class III symptoms described as PEDRO upon less than one block walking. She reports that symptoms have been present since the beginning of the year.   She is followed by Rheumatology for SLE. She is also followed by nephrology for her CKD. No hemodialysis planning have been initiated yet.     HFpEF, EF 55-60%, CKD stage IV, HLD, HTN, Hypothyroidism, PAD,SLE, living donor, kidney in 1985, 45 pack year h/o smoking, quit 1984    Ewelina Arnold is a 73 y.o. female referred by Dr SUKUMAR Burdick for evaluation of severe AS (NYHA Class III sx).    The patient has undergone the following TAVR work-up:   ECHO (Date 4/19/2019): Aortic valve area is 1.24 cm2; peak velocity is 4.18 m/s; mean gradient is 40.53 mmHg., EF= 60%.   [facility] 02/2017:  Patent mid LAD stent. Non obstructive disease.    STS: 5 %   Frailty: 2/4   Iliacs: pending  LVOT area by CTA : pending   Incidental findings on CT: not done  CT Surgery risk assessment: High risk   Rhythm issues: none  PFTs: FEV1 77% predicted, DLCO 80% predicted.  Comorbidities: CKD with Cr 4.4/BUN 90, pericardial effusion, moderate AI, SLE, Single kidney, Frailty      Review of Systems   Constitution: Negative.   HENT: Negative.    Eyes: Negative.    Cardiovascular: Positive for dyspnea on exertion, leg swelling, orthopnea and paroxysmal nocturnal dyspnea. Negative for chest pain, claudication,  cyanosis, irregular heartbeat, near-syncope, palpitations and syncope.   Respiratory: Positive for shortness of breath. Negative for cough, hemoptysis, sleep disturbances due to breathing, snoring, sputum production and wheezing.    Endocrine: Negative.    Hematologic/Lymphatic: Negative.    Gastrointestinal: Negative.    Genitourinary: Negative.      Body mass index is 19.9 kg/m².  Vitals:    04/24/19 1348   BP: (!) 168/76   Pulse:          Current Outpatient Medications:     atorvastatin (LIPITOR) 80 MG tablet, Take 1 tablet (80 mg total) by mouth once daily., Disp: 90 tablet, Rfl: 3    butalbital-aspirin-caffeine -40 mg (FIORINAL) -40 mg Cap, Take 1 capsule by mouth as needed. , Disp: , Rfl:     calcipotriene 0.005 % sclp soln, Apply 1 application topically 2 (two) times daily. (Patient taking differently: Apply 1 application topically 2 (two) times daily as needed. ), Disp: 60 mL, Rfl: 3    carvedilol (COREG) 25 MG tablet, Take 1 tablet (25 mg total) by mouth 2 (two) times daily with meals., Disp: 180 tablet, Rfl: 3    cholestyramine-aspartame (QUESTRAN LIGHT) 4 gram PwPk, Take by mouth., Disp: , Rfl:     citalopram (CELEXA) 20 MG tablet, Take 1.5 tablets (30 mg total) by mouth once daily. (Patient taking differently: Take 20 mg by mouth once daily. ), Disp: 135 tablet, Rfl: 3    clobetasol (TEMOVATE) 0.05 % external solution, Apply topically 2 (two) times daily. (Patient taking differently: Apply topically 2 (two) times daily as needed. ), Disp: 50 mL, Rfl: 2    dicyclomine (BENTYL) 10 MG capsule, Take 10 mg by mouth., Disp: , Rfl:     ergocalciferol (VITAMIN D2) 50,000 unit Cap, Take 1 capsule (50,000 Units total) by mouth every 7 days., Disp: 12 capsule, Rfl: 3    estrogens, conjugated, (PREMARIN) 0.45 MG tablet, Take 42.5 mg by mouth., Disp: , Rfl:     famotidine (PEPCID) 20 MG tablet, Take 1 tablet (20 mg total) by mouth once daily., Disp: 90 tablet, Rfl: 3    furosemide (LASIX) 40  MG tablet, Take 1 tablet (40 mg total) by mouth daily as needed (for weight gain, shortness of breath)., Disp: 90 tablet, Rfl: 3    HYDROcodone-acetaminophen (NORCO) 5-325 mg per tablet, Take 1 tablet by mouth., Disp: , Rfl:     isosorbide mononitrate (IMDUR) 60 MG 24 hr tablet, Take 1 tablet (60 mg total) by mouth once daily., Disp: 90 tablet, Rfl: 3    latanoprost 0.005 % ophthalmic solution, INSTILL 1 DROP INTO BOTH EYES EVERY DAY, Disp: 3 Bottle, Rfl: 3    levothyroxine (SYNTHROID) 75 MCG tablet, Take 1 tablet (75 mcg total) by mouth before breakfast., Disp: 90 tablet, Rfl: 3    meclizine (ANTIVERT) 32 MG tablet, Take 32 mg by mouth., Disp: , Rfl:     metOLazone (ZAROXOLYN) 2.5 MG tablet, Take 2 tablets (5 mg total) by mouth once daily. (Patient taking differently: Take 5 mg by mouth as needed. ), Disp: 180 tablet, Rfl: 3    NIFEdipine (PROCARDIA-XL) 60 MG (OSM) 24 hr tablet, Take 1 tablet (60 mg total) by mouth 2 (two) times daily., Disp: 180 tablet, Rfl: 3    nitroGLYCERIN (NITROSTAT) 0.4 MG SL tablet, Place 1 tablet (0.4 mg total) under the tongue every 5 (five) minutes as needed for Chest pain., Disp: 25 tablet, Rfl: 3    nystatin (MYCOSTATIN) powder, Apply topically., Disp: , Rfl:     omeprazole (PRILOSEC) 20 MG capsule, Take 20 mg by mouth., Disp: , Rfl:     sodium bicarbonate 650 MG tablet, Take 1 tablet (650 mg total) by mouth 2 (two) times daily., Disp: 180 tablet, Rfl: 3    TENS units Sravanthi, by Misc.(Non-Drug; Combo Route) route., Disp: , Rfl:     triamcinolone acetonide 0.5% (KENALOG) 0.5 % Crea, Apply topically., Disp: , Rfl:     aspirin (ECOTRIN) 81 MG EC tablet, Take 1 tablet (81 mg total) by mouth once daily., Disp: 30 tablet, Rfl: 0    doxazosin (CARDURA) 2 MG tablet, Take 1 tablet (2 mg total) by mouth nightly as needed (SBP > 150)., Disp: 90 tablet, Rfl: 3      Medications have been reviewed and reconciled.       Objective:    Physical Exam   Constitutional: She is oriented to  person, place, and time. She appears well-developed and well-nourished.   HENT:   Head: Normocephalic and atraumatic.   Eyes: Conjunctivae are normal.   Neck: Neck supple. No JVD present.   Cardiovascular: Normal rate and regular rhythm. Exam reveals no gallop and no friction rub.   Murmur heard.   Harsh midsystolic murmur is present with a grade of 4/6 at the upper right sternal border radiating to the neck.  Pulses:       Carotid pulses are 2+ on the right side, and 2+ on the left side.       Radial pulses are 2+ on the right side, and 2+ on the left side.        Femoral pulses are 2+ on the right side, and 2+ on the left side.       Popliteal pulses are 2+ on the right side, and 2+ on the left side.        Dorsalis pedis pulses are 2+ on the right side, and 2+ on the left side.        Posterior tibial pulses are 2+ on the right side, and 2+ on the left side.   Pulmonary/Chest: Effort normal and breath sounds normal. No respiratory distress. She has no wheezes. She has no rales.   Abdominal: Soft. Bowel sounds are normal. She exhibits no distension. There is no tenderness. There is no rebound.   Musculoskeletal: Normal range of motion.   Neurological: She is oriented to person, place, and time.   Skin: Skin is warm and dry.   Nursing note and vitals reviewed.        Assessment:       1. Essential hypertension    2. Dyslipidemia    3. Coronary artery disease due to calcified coronary lesion    4. CKD (chronic kidney disease), stage IV    5. Severe calcific aortic stenosis     Ewelina Arnold is a 73 y.o. female referred by Dr SUKUMAR Burdick for evaluation of severe AS (NYHA Class III sx).    The patient has undergone the following TAVR work-up:   ECHO (Date 4/19/2019): Aortic valve area is 1.24 cm2; peak velocity is 4.18 m/s; mean gradient is 40.53 mmHg., EF= 60%.   [facility] 02/2017:  Patent mid LAD stent. Non obstructive disease.    STS: 5 %   Frailty: 2/4   Iliacs: pending  LVOT area by CTA : pending   Incidental findings on  CT: not done  CT Surgery risk assessment: High risk   Rhythm issues: none  PFTs: FEV1 77% predicted, DLCO 80% predicted.  Comorbidities: CKD with Cr 4.0/BUN 90, pericardial effusion, moderate AI, SLE, Single kidney, Frailty     6. LVH (left ventricular hypertrophy)         Plan:       Hypertension  -Controlled today in the office  -Should continue current medical therapy  -Instructed to keep BP log at home and bring it for the next appointment  -Discussed about healthy life style modification including daily exercise 30 min/5 x week, diet (MESH and Mediterranean)improvement according to AHA guidelines. Questions and concerns were properly answered.         Dyslipidemia  -Reviewed lipid panel  -Continue statin therapy     Coronary artery disease due to calcified coronary lesion  -Asymptomatic and no signs of acute symptoms  -Continue current medical therapy including BB, statin and ASA     Severe calcific aortic stenosis  -Patient is too frail for TAVR now given the renal function and very likely allen of going into dialysis.   -Patient should be seen by her nephrologist and have discussion about dialysis initiation. She will end up going to dialysis if she undergoes SAVR or TAVR, We recommend that her nephrologist evaluate her and start her on HD before her valve procedure so we can do her valve replacement afterwards.     LVH (left ventricular hypertrophy)  -Referral to Dr SUKUMAR Burdick/Chandni to evaluate concentric LVH on echo and possible amyloid as a cause  -She also has renal insufficiency which can correlate with amyloid as well.       CKD (chronic kidney disease), stage IV  -Referral to nephrology for HD initiation prior to proceed with valve replacement procedure    Lalit Boyle MD  Interventional Cardiovascular Fellow, PGY VII  Pager: [phone]  4/24/2019 4:14 PM    Staff:  I have personally taken the history and examined this patient and agree with the fellow's note as stated above and amended it accordingly  :-) Seen with Dr. Cabral.  This patient is currently cohort C because of pericardial effusion, Cr 4.4/BUN 90.  After the initiation of hemodialysis we will be able to evaluate her for TAVR.

## 2019-04-24 NOTE — ASSESSMENT & PLAN NOTE
-Asymptomatic and no signs of acute symptoms  -Continue current medical therapy including BB, statin and ASA

## 2019-04-24 NOTE — TELEPHONE ENCOUNTER
Advised pt that per Dr. Mccray, she is to continue her Latanoprost .   Pt verbalized understanding.

## 2019-04-24 NOTE — ASSESSMENT & PLAN NOTE
-Referral to Dr SUKUMAR Burdick/Chandni to evaluate concentric LVH on echo and possible amyloid as a cause  -She also has renal insufficiency which can correlate with amyloid as well.

## 2019-04-26 ENCOUNTER — TELEPHONE (OUTPATIENT)
Dept: TRANSPLANT | Facility: CLINIC | Age: 74
End: 2019-04-26

## 2019-04-26 DIAGNOSIS — I50.22 CHRONIC SYSTOLIC CONGESTIVE HEART FAILURE: Primary | ICD-10-CM

## 2019-05-01 ENCOUNTER — OFFICE VISIT (OUTPATIENT)
Dept: PRIMARY CARE CLINIC | Facility: CLINIC | Age: 74
End: 2019-05-01
Payer: MEDICARE

## 2019-05-01 ENCOUNTER — HOSPITAL ENCOUNTER (OUTPATIENT)
Dept: PULMONOLOGY | Facility: CLINIC | Age: 74
Discharge: HOME | End: 2019-05-01
Payer: MEDICARE

## 2019-05-01 ENCOUNTER — INFUSION (OUTPATIENT)
Dept: INFECTIOUS DISEASES | Facility: HOSPITAL | Age: 74
End: 2019-05-01
Attending: INTERNAL MEDICINE
Payer: MEDICARE

## 2019-05-01 VITALS
DIASTOLIC BLOOD PRESSURE: 59 MMHG | BODY MASS INDEX: 20.28 KG/M2 | HEIGHT: 64 IN | WEIGHT: 118.81 LBS | SYSTOLIC BLOOD PRESSURE: 127 MMHG

## 2019-05-01 VITALS
HEIGHT: 64 IN | HEART RATE: 62 BPM | OXYGEN SATURATION: 91 % | WEIGHT: 119.06 LBS | DIASTOLIC BLOOD PRESSURE: 82 MMHG | BODY MASS INDEX: 20.32 KG/M2 | SYSTOLIC BLOOD PRESSURE: 126 MMHG

## 2019-05-01 VITALS — BODY MASS INDEX: 19.29 KG/M2 | WEIGHT: 113 LBS | HEIGHT: 64 IN

## 2019-05-01 DIAGNOSIS — D63.1 ANEMIA OF CHRONIC RENAL FAILURE, STAGE 4 (SEVERE): Primary | ICD-10-CM

## 2019-05-01 DIAGNOSIS — N18.4 CKD (CHRONIC KIDNEY DISEASE), STAGE IV: ICD-10-CM

## 2019-05-01 DIAGNOSIS — N18.4 ANEMIA OF CHRONIC RENAL FAILURE, STAGE 4 (SEVERE): Primary | ICD-10-CM

## 2019-05-01 DIAGNOSIS — I13.0 CARDIORENAL SYNDROME, STAGE 1-4 OR UNSPECIFIED CHRONIC KIDNEY DISEASE, WITH HEART FAILURE: ICD-10-CM

## 2019-05-01 DIAGNOSIS — I1A.0 RESISTANT HYPERTENSION: ICD-10-CM

## 2019-05-01 PROCEDURE — 3079F DIAST BP 80-89 MM HG: CPT | Mod: HCNC,CPTII,S$GLB, | Performed by: INTERNAL MEDICINE

## 2019-05-01 PROCEDURE — 99215 OFFICE O/P EST HI 40 MIN: CPT | Mod: HCNC,S$GLB,, | Performed by: INTERNAL MEDICINE

## 2019-05-01 PROCEDURE — 1101F PT FALLS ASSESS-DOCD LE1/YR: CPT | Mod: HCNC,CPTII,S$GLB, | Performed by: INTERNAL MEDICINE

## 2019-05-01 PROCEDURE — 3074F SYST BP LT 130 MM HG: CPT | Mod: HCNC,CPTII,S$GLB, | Performed by: INTERNAL MEDICINE

## 2019-05-01 PROCEDURE — 96372 THER/PROPH/DIAG INJ SC/IM: CPT | Mod: HCNC

## 2019-05-01 PROCEDURE — 99215 PR OFFICE/OUTPT VISIT, EST, LEVL V, 40-54 MIN: ICD-10-PCS | Mod: HCNC,S$GLB,, | Performed by: INTERNAL MEDICINE

## 2019-05-01 PROCEDURE — 99499 UNLISTED E&M SERVICE: CPT | Mod: HCNC,S$GLB,, | Performed by: INTERNAL MEDICINE

## 2019-05-01 PROCEDURE — 94618 PULMONARY STRESS TESTING: ICD-10-PCS | Mod: HCNC,S$GLB,, | Performed by: INTERNAL MEDICINE

## 2019-05-01 PROCEDURE — 3074F PR MOST RECENT SYSTOLIC BLOOD PRESSURE < 130 MM HG: ICD-10-PCS | Mod: HCNC,CPTII,S$GLB, | Performed by: INTERNAL MEDICINE

## 2019-05-01 PROCEDURE — 99499 RISK ADDL DX/OHS AUDIT: ICD-10-PCS | Mod: HCNC,S$GLB,, | Performed by: INTERNAL MEDICINE

## 2019-05-01 PROCEDURE — 63600175 PHARM REV CODE 636 W HCPCS: Mod: HCNC,JG | Performed by: INTERNAL MEDICINE

## 2019-05-01 PROCEDURE — 1101F PR PT FALLS ASSESS DOC 0-1 FALLS W/OUT INJ PAST YR: ICD-10-PCS | Mod: HCNC,CPTII,S$GLB, | Performed by: INTERNAL MEDICINE

## 2019-05-01 PROCEDURE — 94618 PULMONARY STRESS TESTING: CPT | Mod: HCNC,S$GLB,, | Performed by: INTERNAL MEDICINE

## 2019-05-01 PROCEDURE — 3079F PR MOST RECENT DIASTOLIC BLOOD PRESSURE 80-89 MM HG: ICD-10-PCS | Mod: HCNC,CPTII,S$GLB, | Performed by: INTERNAL MEDICINE

## 2019-05-01 RX ADMIN — DARBEPOETIN ALFA 40 MCG: 40 INJECTION, SOLUTION INTRAVENOUS; SUBCUTANEOUS at 02:05

## 2019-05-01 NOTE — Clinical Note
Dear Ray Burdick and Zhou,Ms Clayton has a 3# (on home scale with no clothes) and 2# (on clinic scale with clothes) since her last appointment. She is compliant with all meds. She has PEDRO that has slowly worsened over the last month. She's been consistently using her lasix for the past 3 weeks. She sees both of you in clinic soon.Thanks,WENDY (PCP)

## 2019-05-01 NOTE — PROGRESS NOTES
Primary Care Provider Appointment    Subjective:      Patient ID: Ewelina Arnold is a 73 y.o. female with ESRD, cardiorenal syndrome, aortic stenosis    Chief Complaint: Follow-up; Extremity Weakness; and Shortness of Breath (after walking for a while )    Patient with recent consult with interventional cardiology with discussion of valve replacement, is not eligible for TAVR due to frailty. There is concern for amyloid disease given kidney and renal dysfunction.    Her weight has increased by 2-3# on her home scale with no clothes, 8# with clinic scale and clothes. Seen by CHF, Dr Burdick, recently.    Rheum stopped her plaquenil due to concern for maculopathy. She is removing the pills from her pill pack.    She is taking lasix PRN BID, has been using it frequently but skipped on Monday when she had diarrhea. Uses it sparingly for foot swelling.Is expected to progress to dialysis.     BP controlled, she is not taking doxazosin and removes this from her pill pack.    She is now followed by DEJUAN Andino with [facility]. Has frequent check-ins.    Pills packed as follows:  Adherence packed for 28 days using Dispill:     Morning card:  Asprin 81 mg  Carvedlol 25 mg  Citalopram 20 mg (1&1/2 tabs)  Famotidine 20 mg  Hydroxychloroquine 200 mg  Isosorbide Mononitrate ER 60 mg  Levothyroxine 75 mcg  Nifedipine ER Osmotic 60 mg  Sodium Bicarb 650 mg  Vitamin D 95695 IU (Every Saturdays pocket)        Evening card:  Atorvastatin 80 mg  Carvedilol 25 mg  Nifedipine ER Osmotic 60 mg  Sodium Bicarb 650 mg  Doxasosin 2 mg, take 1 additional tablet if blood pressure is over 160      PRN:  Doxazosin 2 mg             Electronically signed by Tatyana Garsia, PharmD at 4/4/2019  1:58 PM       Past Surgical History:   Procedure Laterality Date    BIOPSY-BONE MARROW Left 10/16/2017    Performed [facility] OR [address] [place] [address]    CARDIAC CATHETERIZATION  07/27/2011    x1    CHOLECYSTECTOMY  1999    COLON  SURGERY  2000 & 2003    partial removal    COLONOSCOPY N/A 9/14/2017    Performed by [facility] ENDO (4TH FLR)    COLONOSCOPY N/A 4/14/2016    Performed by [facility] at [facility] ENDO (4TH FLR)    COLONOSCOPY N/A 4/23/2013    Performed by [facility] at [facility] ENDO (4TH FLR)    TALIA-TRANSFORAMINAL Left 11/3/2016    Performed by [facility]    ESOPHAGOGASTRODUODENOSCOPY (EGD) N/A 3/16/2017    [facility] ENDO    EYE SURGERY      HAND SURGERY Bilateral 1996 & 1997    due to carpal tunnel     HERNIA REPAIR      HYSTERECTOMY  1983    INJECTION-STEROID-EPIDURAL-TRANSFORAMINAL Left 1/7/2016    Performed [facility]    Left heart cath Left 12/20/2017    Performed by Chandan Ly MD at [facility] CATH LAB    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    OOPHORECTOMY      removed one    Peripheral Iridotomy both eyes  1994    laser angle correction    THYROIDECTOMY, PARTIAL  2006    to remove two nodules and one-half thyroid       Past Medical History:   Diagnosis Date    Acute hypoxemic respiratory failure 4/28/2018    Acute on chronic diastolic congestive heart failure 11/17/2017    Allergy     Anatomical narrow angle glaucoma     Anemia     States diagnosed about a month ago    Aortic atherosclerosis     Arthritis     Cataract     CHF (congestive heart failure)     Chronic kidney disease     Chronic sciatica of left side     Right lower back pain due to sciatica    Coronary artery disease     Depression     Dry eyes     GERD (gastroesophageal reflux disease)     History of colon cancer     Hyperaldosteronism     Hyperlipidemia     Hypertension     Hypothyroidism     Keloid cicatrix     Left ventricular diastolic dysfunction with preserved systolic function     Lupus (systemic lupus erythematosus) 8/17/2012    Malnutrition 5/16/2017    Osteoarthritis of cervical spine  2012    Osteopenia     PAD (peripheral artery disease)     FRAN (renal artery stenosis)        Social History     Socioeconomic History    Marital status: Single     Spouse name: Not on file    Number of children: Not on file    Years of education: Not on file    Highest education level: Not on file   Occupational History     Employer: Retired    Social Needs    Financial resource strain: Not very hard    Food insecurity:     Worry: Never true     Inability: Never true    Transportation needs:     Medical: No     Non-medical: No   Tobacco Use    Smoking status: Former Smoker     Packs/day: 1.50     Years: 30.00     Pack years: 45.00     Types: Cigarettes     Start date:      Last attempt to quit: 3/13/1985     Years since quittin.1    Smokeless tobacco: Never Used   Substance and Sexual Activity    Alcohol use: No    Drug use: No    Sexual activity: Not Currently     Partners: Male     Birth control/protection: Abstinence   Lifestyle    Physical activity:     Days per week: Not on file     Minutes per session: Not on file    Stress: Not on file   Relationships    Social connections:     Talks on phone: Not on file     Gets together: Not on file     Attends Sabianism service: Not on file     Active member of club or organization: Not on file     Attends meetings of clubs or organizations: Not on file     Relationship status: Not on file   Other Topics Concern    Are you pregnant or think you may be? No    Breast-feeding No   Social History Narrative    Not on file       Review of Systems   Constitutional: Positive for activity change. Negative for appetite change.   Respiratory: Negative for cough and shortness of breath.    Cardiovascular: Negative for leg swelling.   Genitourinary: Negative for difficulty urinating and dysuria.   Skin: Positive for wound.   Neurological: Negative for dizziness and light-headedness.   Hematological: Bruises/bleeds easily.   Psychiatric/Behavioral:  "Negative for agitation, behavioral problems, confusion, decreased concentration and dysphoric mood.       Objective:   /82   Pulse 62   Ht 5' 4" (1.626 m)   Wt 54 kg (119 lb 0.8 oz)   LMP  (LMP Unknown) Comment: 99  SpO2 (!) 91%   BMI 20.43 kg/m²     Physical Exam   Constitutional: She is oriented to person, place, and time. She appears well-developed and well-nourished.   HENT:   Head: Normocephalic and atraumatic.   Neck: Normal range of motion.   Pulmonary/Chest: Effort normal.   Musculoskeletal: She exhibits deformity. She exhibits no edema.   Resolution of LE nodules   Neurological: She is alert and oriented to person, place, and time.   Psychiatric: She has a normal mood and affect. Her behavior is normal. Thought content normal.   Vitals reviewed.      Lab Results   Component Value Date    WBC 3.18 (L) 12/18/2018    HGB 9.1 (L) 05/01/2019    HCT 28.8 (L) 05/01/2019     12/18/2018    CHOL 181 08/28/2018    TRIG 54 08/28/2018    HDL 66 08/28/2018    ALT 14 03/18/2019    AST 35 03/18/2019     (L) 04/11/2019    K 3.6 04/11/2019    CL 95 04/11/2019    CREATININE 4.4 (H) 04/11/2019    BUN 91 (H) 04/11/2019    CO2 29 04/11/2019    TSH 1.798 01/25/2019    INR 1.0 09/05/2018    GLUF 79 12/02/2011    HGBA1C 5.2 09/25/2018       RESULTS: Reviewed labs and images today    Assessment:   73 y.o. female with multiple co-morbid illnesses here to continue work-up of chronic issues notably with ESRD, cardiorenal syndrome, aortic stenosis    Plan:     Problem List Items Addressed This Visit        Cardiac/Vascular    Cardiorenal syndrome, stage 1-4 or unspecified chronic kidney disease, with heart failure     Patient with worsening kidney function in the setting of decompensated CHF and aortic stenosisF  · Continue frequent diuresis  · F/U Nephrology for progression to dialysis  · Being considered for cardiac biopsy for work-up for amyloidosis  · PCP in discussion with Dr Burdick about potential for fat " pad biopsy or genetics testing in advance            Renal/    CKD (chronic kidney disease), stage IV     Kidney donation history, 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 Total face-to-face time was 60 min, 50% of this was spent on counseling and coordination of care. The following issues were discussed: with ESRD, cardiorenal syndrome, aortic stenosis    Amarilys Johns MD/MPH  Internal Medicine  [facility]  [phone]

## 2019-05-01 NOTE — ASSESSMENT & PLAN NOTE
Patient with worsening kidney function in the setting of decompensated CHF and aortic stenosisF  · Continue frequent diuresis  · F/U Nephrology for progression to dialysis  · Being considered for cardiac biopsy for work-up for amyloidosis  · PCP in discussion with Dr Burdick about potential for fat pad biopsy or genetics testing in advance

## 2019-05-01 NOTE — MEDICAL/APP STUDENT
Subjective:       Patient ID: Ewelina Arnold is a 73 y.o. female.    Chief Complaint: Follow-up; Extremity Weakness; and Shortness of Breath (after walking for a while )    Ms Arnold 73 F with ESRD, HTN, aortic stenosis,Â and SLE, presented with SOB and weakness for more than a month. Frequency is intermittent. Progression is unchanged. No precipitating events. No alleviating factors. Exacerbating factor include walking or exercise. No chest pain. No headache/dizziness. No swelling (except L foot dorsal).     Pt had recent consult with Cardiologist with discussion of valve replacement, yet pt is not eligible for TAVR due to frailty. Pt is recommended to have dialysis 2/2 to ESRD. Pt will see a nephrologist this Friday. BP controlled, Pt is not taking doxazosin.     Pt also reported vision change (seeing ribbon) recently.  Both Ophthalmologist and Rheumatologist suggested stopping Plaquenil for her SLE.    Pt also reported she is no longer allergic to Imodium    She is now followed by DEJUAN Andino with [facility].     Review of Systems   Constitutional: Positive for fatigue. Negative for appetite change, chills, diaphoresis, fever and unexpected weight change.   HENT: Negative.    Eyes: Negative.    Respiratory: Positive for shortness of breath. Negative for apnea, cough, choking, chest tightness, wheezing and stridor.    Cardiovascular: Negative.  Negative for chest pain, palpitations and leg swelling.   Gastrointestinal: Negative.    Endocrine: Negative.    Genitourinary: Negative.    Musculoskeletal: Negative.    Skin: Negative.    Allergic/Immunologic: Negative.    Neurological: Positive for weakness. Negative for dizziness, tremors, seizures, syncope, facial asymmetry, speech difficulty, light-headedness, numbness and headaches.   Hematological: Negative.    Psychiatric/Behavioral: Negative.        Objective:       Vitals:    05/01/19 1225   BP: 126/82   Pulse: 62       Physical Exam    Constitutional: She is oriented to person, place, and time. She appears well-developed and well-nourished. No distress.   HENT:   Head: Normocephalic and atraumatic.   Eyes: Pupils are equal, round, and reactive to light. EOM are normal.   Neck: Normal range of motion. Neck supple.   Cardiovascular: Normal rate, regular rhythm and intact distal pulses. Exam reveals no gallop and no friction rub.   Murmur heard.  Systolic murmur at L upper sternal area   Pulmonary/Chest: Effort normal and breath sounds normal. No stridor. No respiratory distress. She has no wheezes. She has no rales. She exhibits no tenderness.   Abdominal: Soft. Bowel sounds are normal.   Musculoskeletal: Normal range of motion. She exhibits no edema.   Neurological: She is alert and oriented to person, place, and time. She displays normal reflexes. No cranial nerve deficit or sensory deficit. She exhibits normal muscle tone. Coordination normal.   Skin: Skin is warm and dry. Capillary refill takes less than 2 seconds. No rash noted. She is not diaphoretic. No erythema. No pallor.   Psychiatric: She has a normal mood and affect. Her behavior is normal.       Assessment and Plan:       SOB and weakness 2/2 to ESRD  -BUN 91 in 04/2019 (trending up from 44, 4 months ago).  -Cr 4.4 in 04/2019 (trending up from 3.4, 4 months ago).  -Continue diuresis  -F/U with Nephrology for dialysis decision

## 2019-05-01 NOTE — PROGRESS NOTES
Hgb 9.1;Hct 28.8    Increased dose PER PROTOCOL FORM DROD-188 from Aranesp 25 mcg to Aranesp 40 mcg..Aranesp 40 mcg given and 3 week return set. Iron sats down to 16% patient feeling drained. She is not on Iron supplements. Dr Epperson notified and Iron food list given.  Gfr 10.8/stage 5

## 2019-05-01 NOTE — PATIENT INSTRUCTIONS
TODAY:  - repack pill packs with no doxazosin or plaquenil for  on 5/3/19  - PCP messaged Ray Burdick and Zhou about your weight gain

## 2019-05-01 NOTE — PROCEDURES
Ewelina Arnold is a 73 y.o.  female patient, who presents for a 6 minute walk test ordered by Yeimi Burdick MD.  The diagnosis is Aortic Valve Disorder; Cardiomyopathy.  The patient's BMI is 19.4 kg/m2.  Predicted distance (lower limit of normal) is 331.35 meters.      Test Results:    The test was not completed.  The patient stopped 1 time for a total of 120 seconds.  The total time walked was 240 seconds.  During walking, the patient reported:  Dyspnea; Leg weakness; Unsteady gait.  The patient used no assistive devices during testing.     05/01/2019---------Distance: 121.92 meters (400 feet)     O2 Sat % Supplemental Oxygen Heart Rate Blood Pressure Dian Scale   Pre-exercise  (Resting) 97 % Room Air 62 bpm 131/63 mmHg 2   During Exercise 98 % Room Air 62 bpm 130/60 mmHg 3   Post-exercise  (Recovery) 97 % Room Air  62 bpm       Recovery Time:  82 seconds    Performing nurse/tech:  HILTON Montano      PREVIOUS STUDY:   01/10/2019---------Distance: 259.08 meters (850 feet)       O2 Sat % Supplemental Oxygen Heart Rate Blood Pressure Dian Scale   Pre-exercise  (Resting) 99 % Room Air 61 bpm 188/81 mmHg 0   During Exercise 98 % Room Air 65 bpm 192/82 mmHg 3   Post-exercise  (Recovery) 98 % Room Air  62 bpm             CLINICAL INTERPRETATION:  Six minute walk distance is 121.92 meters (400 feet) with moderate dyspnea.  During exercise, there was no desaturation while breathing room air.  Both blood pressure and heart rate remained stable with walking.  The patient reported non-pulmonary symptoms during exercise.  Severe exercise impairment is likely due to subjective symptoms.  The patient did not complete the study, walking 240 seconds of the 360 second test.  Since the previous study in January 2019, exercise capacity is significantly worse.  Based upon age and body mass index, exercise capacity is less than predicted.

## 2019-05-01 NOTE — ASSESSMENT & PLAN NOTE
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

## 2019-05-02 NOTE — PROGRESS NOTES
Adherence packed for 28 days using Dispill:     Morning card:  Asprin 81 mg  Carvedlol 25 mg  Citalopram 20 mg (1&1/2 tabs)  Famotidine 20 mg  Isosorbide Mononitrate ER 60 mg  Levothyroxine 75 mcg  Nifedipine ER Osmotic 60 mg  Sodium Bicarb 650 mg  Vitamin D 32790 IU (Every Saturdays pocket)        Evening card:  Atorvastatin 80 mg  Carvedilol 25 mg  Nifedipine ER Osmotic 60 mg  Sodium Bicarb 650 mg       PRN:  Doxazosin 2 mg

## 2019-05-03 ENCOUNTER — OFFICE VISIT (OUTPATIENT)
Dept: NEPHROLOGY | Facility: CLINIC | Age: 74
End: 2019-05-03
Payer: MEDICARE

## 2019-05-03 ENCOUNTER — TELEPHONE (OUTPATIENT)
Dept: NEPHROLOGY | Facility: CLINIC | Age: 74
End: 2019-05-03

## 2019-05-03 ENCOUNTER — HOSPITAL ENCOUNTER (OUTPATIENT)
Dept: RADIOLOGY | Facility: HOSPITAL | Age: 74
Discharge: HOME | End: 2019-05-03
Attending: INTERNAL MEDICINE
Payer: MEDICARE

## 2019-05-03 ENCOUNTER — TELEPHONE (OUTPATIENT)
Dept: TRANSPLANT | Facility: CLINIC | Age: 74
End: 2019-05-03

## 2019-05-03 VITALS
DIASTOLIC BLOOD PRESSURE: 60 MMHG | SYSTOLIC BLOOD PRESSURE: 110 MMHG | BODY MASS INDEX: 20.36 KG/M2 | OXYGEN SATURATION: 98 % | HEART RATE: 45 BPM | WEIGHT: 119.25 LBS | HEIGHT: 64 IN

## 2019-05-03 DIAGNOSIS — N18.4 CHRONIC KIDNEY DISEASE, STAGE IV (SEVERE): Primary | ICD-10-CM

## 2019-05-03 DIAGNOSIS — R06.02 SOB (SHORTNESS OF BREATH): ICD-10-CM

## 2019-05-03 DIAGNOSIS — N18.4 CHRONIC KIDNEY DISEASE, STAGE IV (SEVERE): ICD-10-CM

## 2019-05-03 PROCEDURE — 71046 X-RAY EXAM CHEST 2 VIEWS: CPT | Mod: TC,HCNC

## 2019-05-03 PROCEDURE — 99214 PR OFFICE/OUTPT VISIT, EST, LEVL IV, 30-39 MIN: ICD-10-PCS | Mod: HCNC,S$GLB,, | Performed by: INTERNAL MEDICINE

## 2019-05-03 PROCEDURE — 3074F PR MOST RECENT SYSTOLIC BLOOD PRESSURE < 130 MM HG: ICD-10-PCS | Mod: HCNC,CPTII,S$GLB, | Performed by: INTERNAL MEDICINE

## 2019-05-03 PROCEDURE — 3074F SYST BP LT 130 MM HG: CPT | Mod: HCNC,CPTII,S$GLB, | Performed by: INTERNAL MEDICINE

## 2019-05-03 PROCEDURE — 71046 XR CHEST PA AND LATERAL: ICD-10-PCS | Mod: 26,HCNC,, | Performed by: RADIOLOGY

## 2019-05-03 PROCEDURE — 1101F PR PT FALLS ASSESS DOC 0-1 FALLS W/OUT INJ PAST YR: ICD-10-PCS | Mod: HCNC,CPTII,S$GLB, | Performed by: INTERNAL MEDICINE

## 2019-05-03 PROCEDURE — 99499 UNLISTED E&M SERVICE: CPT | Mod: HCNC,S$GLB,, | Performed by: INTERNAL MEDICINE

## 2019-05-03 PROCEDURE — 99499 RISK ADDL DX/OHS AUDIT: ICD-10-PCS | Mod: HCNC,S$GLB,, | Performed by: INTERNAL MEDICINE

## 2019-05-03 PROCEDURE — 99999 PR PBB SHADOW E&M-EST. PATIENT-LVL IV: ICD-10-PCS | Mod: PBBFAC,HCNC,, | Performed by: INTERNAL MEDICINE

## 2019-05-03 PROCEDURE — 71046 X-RAY EXAM CHEST 2 VIEWS: CPT | Mod: 26,HCNC,, | Performed by: RADIOLOGY

## 2019-05-03 PROCEDURE — 3078F DIAST BP <80 MM HG: CPT | Mod: HCNC,CPTII,S$GLB, | Performed by: INTERNAL MEDICINE

## 2019-05-03 PROCEDURE — 99214 OFFICE O/P EST MOD 30 MIN: CPT | Mod: HCNC,S$GLB,, | Performed by: INTERNAL MEDICINE

## 2019-05-03 PROCEDURE — 1101F PT FALLS ASSESS-DOCD LE1/YR: CPT | Mod: HCNC,CPTII,S$GLB, | Performed by: INTERNAL MEDICINE

## 2019-05-03 PROCEDURE — 3078F PR MOST RECENT DIASTOLIC BLOOD PRESSURE < 80 MM HG: ICD-10-PCS | Mod: HCNC,CPTII,S$GLB, | Performed by: INTERNAL MEDICINE

## 2019-05-03 PROCEDURE — 99999 PR PBB SHADOW E&M-EST. PATIENT-LVL IV: CPT | Mod: PBBFAC,HCNC,, | Performed by: INTERNAL MEDICINE

## 2019-05-03 NOTE — TELEPHONE ENCOUNTER
----- Message from Anat Caruso sent at 5/3/2019  4:38 PM CDT -----  Contact: pt  Pt would like a call back  Pt is short of breath    Did chest xray today    Please  Call [phone]   thanks    Spoke to pt and stated that she did speak with Dr. Epperson already and he did inform her on what she can do to help with SOB /and  I informed her that she can go to ED for SOB /pt verbalized understanding and stated that she will call her PCP or go to ED

## 2019-05-03 NOTE — TELEPHONE ENCOUNTER
Spoke to Ms. Ewelina Arnold today; Dr. Ly wished to have her evaluated for possible cardiac amyloidosis. Dr. Burdick was informed of request. Ms. Arnold indicated that she was short of breath today when she was her nephrologist who then performed CXR. She stated that her lungs were clear so contributed the shortness of breathe to aortic stenosis which is what she is being evaluated for by Dr. Ly's team.     Informed Ms. Arnold that Dr. Burdick would like to begin amyloidosis evaluation with lab, and possible PYP scan to be scheduled in one week so that it could be first approved by her insurance. She agreed with the plan, therefore would contact her on Monday with her appointment. Verbalized understanding of all instructions.

## 2019-05-06 DIAGNOSIS — I42.5 OTHER RESTRICTIVE CARDIOMYOPATHY: Primary | ICD-10-CM

## 2019-05-07 DIAGNOSIS — E03.9 HYPOTHYROIDISM, UNSPECIFIED TYPE: ICD-10-CM

## 2019-05-07 RX ORDER — LEVOTHYROXINE SODIUM 75 UG/1
75 TABLET ORAL
Qty: 90 TABLET | Refills: 3 | Status: SHIPPED | OUTPATIENT
Start: 2019-05-07 | End: 2019-12-13 | Stop reason: SDUPTHER

## 2019-05-07 RX ORDER — FAMOTIDINE 20 MG/1
20 TABLET, FILM COATED ORAL DAILY
Qty: 90 TABLET | Refills: 3 | Status: SHIPPED | OUTPATIENT
Start: 2019-05-07 | End: 2019-12-13 | Stop reason: SDUPTHER

## 2019-05-09 ENCOUNTER — PATIENT MESSAGE (OUTPATIENT)
Dept: PRIMARY CARE CLINIC | Facility: CLINIC | Age: 74
End: 2019-05-09

## 2019-05-09 ENCOUNTER — PATIENT MESSAGE (OUTPATIENT)
Dept: NEPHROLOGY | Facility: CLINIC | Age: 74
End: 2019-05-09

## 2019-05-09 NOTE — PROGRESS NOTES
Subjective:       Patient ID: Ewelina Arnold is a 73 y.o. Black or  female who presents for re-evaluation of progression of Chronic Kidney Disease    HPI    Ms. 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  Patient reports daily weights have been trending up, though attributes to increase appetite and oral intake.  She reports shortness of breath, recently seen by Interventional Cardiology for possible TAVR.  She reports blood pressure improved and relatively stable since last visit, now 110-140's systolic.  She otherwise denies any fever, chest pain, abdominal pain, dysuria/hematuria.     Review of Systems   Constitutional: Negative for appetite change, fatigue and fever.   Respiratory: Negative for chest tightness and shortness of breath.    Cardiovascular: Negative for chest pain and leg swelling.   Gastrointestinal: Negative for abdominal pain, constipation, diarrhea, nausea and vomiting.   Genitourinary: Negative for difficulty urinating, dysuria, flank pain, frequency, hematuria and urgency.   Musculoskeletal: Negative for arthralgias, joint swelling and myalgias.   Skin: Negative for rash and wound.   Neurological: Negative for dizziness, weakness and light-headedness.   All other systems reviewed and are negative.      Objective:      Physical Exam   Constitutional: She appears well-developed and well-nourished.   Cardiovascular: Normal rate, regular rhythm and normal heart sounds. Exam reveals no gallop and no friction rub.   No murmur heard.  Pulmonary/Chest: Effort normal and breath sounds normal. No respiratory distress. She has no wheezes. She has no rales.   Abdominal: Soft. Bowel sounds are normal. There is no tenderness.   Musculoskeletal: She  exhibits no edema.   Neurological: She is alert.   Skin: Skin is warm and dry. No rash noted. No erythema.   Psychiatric: She has a normal mood and affect.       Assessment:       1. Chronic kidney disease, stage IV (severe)    2. SOB (shortness of breath)        Plan:     Ms. 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  Patient previously with acute kidney injury, likely due to cardiomyopathy and moderate volume depletion with diuresis, creatinine improved prior to and since discharge.  Will trend symptoms/creatinine closely, will phone review BP diary and daily weights.  Encouraged oral/fluid intake for now, suspect dry weight ~108-111lbs, will obtain CXR to rule out pulmonary edema (symptoms may be secondary to valvular disease), further recommendations pending results of above, patient voiced understanding of above, agreeable to plan as noted.    - Aortic Stenosis: patient seen by Interventional Cardiology, they are reluctant to perform TAVR given patient's kidney function and risk for worsening function requiring dialysis (though would suspect kidney function may improve with intervention).  Will discuss with Dr. Burdick prior to considering initiation of HD (discussed at length with patient, patient reports Cardiology had not discussed patient's reluctance to start HD).   - Anemia: Hgb previously at goal, given IV iron, continue darbepoetin in Anemia clinic  - Bone/mineral metabolism: patient with secondary hyperparathyroidism, PTH at goal for stage CKD, patient on ergocalciferol for VitD deficiency    Return to clinic in 6-8 weeks with renal/heme panel, iron/TIBC/ferritin, urinalysis/culture, urine protein/creatinine ratio prior to next visit

## 2019-05-10 ENCOUNTER — LAB VISIT (OUTPATIENT)
Dept: LAB | Facility: HOSPITAL | Age: 74
End: 2019-05-10
Attending: INTERNAL MEDICINE
Payer: MEDICARE

## 2019-05-10 ENCOUNTER — TELEPHONE (OUTPATIENT)
Dept: TRANSPLANT | Facility: CLINIC | Age: 74
End: 2019-05-10

## 2019-05-10 DIAGNOSIS — I50.22 CHRONIC SYSTOLIC CONGESTIVE HEART FAILURE: ICD-10-CM

## 2019-05-10 LAB
ANION GAP SERPL CALC-SCNC: 11 MMOL/L (ref 8–16)
BNP SERPL-MCNC: 2539 PG/ML (ref 0–99)
BUN SERPL-MCNC: 89 MG/DL (ref 8–23)
CALCIUM SERPL-MCNC: 8.6 MG/DL (ref 8.7–10.5)
CHLORIDE SERPL-SCNC: 94 MMOL/L (ref 95–110)
CO2 SERPL-SCNC: 30 MMOL/L (ref 23–29)
CREAT SERPL-MCNC: 4.2 MG/DL (ref 0.5–1.4)
EST. GFR  (AFRICAN AMERICAN): 11.4 ML/MIN/1.73 M^2
EST. GFR  (NON AFRICAN AMERICAN): 9.9 ML/MIN/1.73 M^2
GLUCOSE SERPL-MCNC: 88 MG/DL (ref 70–110)
POTASSIUM SERPL-SCNC: 3.9 MMOL/L (ref 3.5–5.1)
SODIUM SERPL-SCNC: 135 MMOL/L (ref 136–145)

## 2019-05-10 PROCEDURE — 80048 BASIC METABOLIC PNL TOTAL CA: CPT | Mod: HCNC

## 2019-05-10 PROCEDURE — 83880 ASSAY OF NATRIURETIC PEPTIDE: CPT | Mod: HCNC

## 2019-05-10 PROCEDURE — 36415 COLL VENOUS BLD VENIPUNCTURE: CPT | Mod: HCNC

## 2019-05-10 NOTE — TELEPHONE ENCOUNTER
Spoke to Ms. Arnold to reschedule her appt with Dr. Burdick (as requested by Dr. Burdick) on same day as PYP scan is available so that results can be discussed at the time of the visit. Instructed to keep lab appt so that results can be available at the time of her visit as well. Will call her back when PYP and MD appt can be rescheduled. Verbalized understanding of all instructions.

## 2019-05-13 ENCOUNTER — TELEPHONE (OUTPATIENT)
Dept: INTERNAL MEDICINE | Facility: CLINIC | Age: 74
End: 2019-05-13

## 2019-05-13 DIAGNOSIS — D89.89 KAPPA LIGHT CHAIN DISEASE: Primary | ICD-10-CM

## 2019-05-13 DIAGNOSIS — R77.1: ICD-10-CM

## 2019-05-13 DIAGNOSIS — E85.9 AMYLOIDOSIS, UNSPECIFIED TYPE: ICD-10-CM

## 2019-05-13 NOTE — TELEPHONE ENCOUNTER
Meaghan- Patient has abnormal protein counts in her labs. Please schedule with Heme-Onc at earliest appt available. She was explained this in detail by phone today.    Thanks,  KJ

## 2019-05-15 ENCOUNTER — TELEPHONE (OUTPATIENT)
Dept: TRANSPLANT | Facility: CLINIC | Age: 74
End: 2019-05-15

## 2019-05-15 DIAGNOSIS — I50.43 ACUTE ON CHRONIC COMBINED SYSTOLIC AND DIASTOLIC HEART FAILURE: Primary | ICD-10-CM

## 2019-05-15 NOTE — TELEPHONE ENCOUNTER
Spoke to Ms. Ewelina Arnold to schedule PYP scan as requested by Dr. Burdick. Test scheduled on 5/29/19 at 1:00 pm. Understands purpose of test, detect cardiac TTR amyloidosis. All questions answered. Verbalized understanding of all instructions

## 2019-05-16 ENCOUNTER — PES CALL (OUTPATIENT)
Dept: ADMINISTRATIVE | Facility: CLINIC | Age: 74
End: 2019-05-16

## 2019-05-20 ENCOUNTER — TELEPHONE (OUTPATIENT)
Dept: HEMATOLOGY/ONCOLOGY | Facility: CLINIC | Age: 74
End: 2019-05-20

## 2019-05-20 ENCOUNTER — TELEPHONE (OUTPATIENT)
Dept: INTERNAL MEDICINE | Facility: CLINIC | Age: 74
End: 2019-05-20

## 2019-05-20 NOTE — TELEPHONE ENCOUNTER
Returned call spoke with patient and assisted with scheduling her apt. Explained we are scheduling her with the NP to get her back in the clinic. She voiced understanding. Apt made and mailed out

## 2019-05-20 NOTE — TELEPHONE ENCOUNTER
----- Message from Chanellejuma Maradiaga sent at 5/20/2019  3:54 PM CDT -----  Contact: Patient [phone]  Pt states that she is calling to speak with  in regards to her BP medication. Please advise.      Thanks

## 2019-05-20 NOTE — TELEPHONE ENCOUNTER
Spoke sith pt, she states that her BP is fluctuating, this has been going on for a few weeks. It's jumping from 180's over 70' then dropping down to 118's over 50's. She states that when she walks around her house she does feel off balance. She wants to address the issue because she is getting new medication packets in 2 weeks.  She is not having any other symptoms.  Please Advise

## 2019-05-20 NOTE — TELEPHONE ENCOUNTER
----- Message from Josephine Forrest sent at 5/20/2019 10:32 AM CDT -----  EP!    ----- Message -----  From: Gerri Rojas  Sent: 5/20/2019  10:29 AM  To: Josephine Forrest    When I schedule her with RAMON. Will it be an EP or NP?    ----- Message -----  From: Estela Ivory RN  Sent: 5/20/2019   9:05 AM  To: Gerri Rojas    Good morning,    I did not. Was waiting for clarification. You can call to schedule. Is she going to be an EP or NP? Still unsure.    Estela TAN  ----- Message -----  From: Gerri Rojas  Sent: 5/20/2019   8:53 AM  To: Estela Ivory RN    I see you were cc'd and in the original message. Did you call her ?  I don't mind calling, just didn't want to double call.  ----- Message -----  From: Josephine Forrest  Sent: 5/20/2019   8:21 AM  To: Estela Ivory RN, Gerri Rojas    Will have one of our APPs see next available.   Thanks Estela     ----- Message -----  From: Anju Pham MD  Sent: 5/20/2019   8:20 AM  To: Josephine Forrest    Yes    ----- Message -----  From: Josephine Forrest  Sent: 5/20/2019   7:52 AM  To: Anju Pham MD    Next available with RAMON?     ----- Message -----  From: Portia Ann RN  Sent: 5/17/2019   6:17 PM  To: Josephine Forrest        ----- Message -----  From: Estela Ivory RN  Sent: 5/17/2019   5:09 PM  To: Ankit Laureano     Good afternoon,    The above pt last saw Dr. Pham in 2017 for Dx: Anemia. They are requesting a follow up. Did you want me to schedule a f/u with Dr. Pham or a NP with a Hem provider?    Thanks,  Estela TAN    ----- Message -----  From: Lucero Finney  Sent: 5/17/2019   2:23 PM  To: [facility]    Please contact patient for an appointment.  Thanks

## 2019-05-21 ENCOUNTER — TELEPHONE (OUTPATIENT)
Dept: INTERNAL MEDICINE | Facility: CLINIC | Age: 74
End: 2019-05-21

## 2019-05-21 NOTE — TELEPHONE ENCOUNTER
----- Message from Kenzie Stewart sent at 5/21/2019  9:06 AM CDT -----  Contact: [phone]  Patient is requesting a call from Meaghan regarding a her medication list.  Patient stated one medication is missing.    Please advise, thank you

## 2019-05-21 NOTE — TELEPHONE ENCOUNTER
Pt states that she has talked to someone already regarding her medications and that they will be calling her back soon

## 2019-05-21 NOTE — TELEPHONE ENCOUNTER
Spoke with pt states she is taking the cardura she states she does believe that the furosemide is causing her bp to be low she does cut them at half.  Yesterday at 3:29 her bp was 118/57 at 9:44 it was  144/65 and at it was 10:32 160/71

## 2019-05-21 NOTE — TELEPHONE ENCOUNTER
Pt states when her bp is low she feels sort of whoozy, she has not taking her medication of yet but she does have a slight headache she states she will keep track of her symptoms also

## 2019-05-21 NOTE — TELEPHONE ENCOUNTER
Please see if she's also taking cardura in addition to the pill packs. Do the low BP readings correspond to that?    Thanks  KJ

## 2019-05-22 ENCOUNTER — DOCUMENTATION ONLY (OUTPATIENT)
Dept: TRANSPLANT | Facility: CLINIC | Age: 74
End: 2019-05-22

## 2019-05-22 ENCOUNTER — INFUSION (OUTPATIENT)
Dept: INFECTIOUS DISEASES | Facility: HOSPITAL | Age: 74
End: 2019-05-22
Attending: INTERNAL MEDICINE
Payer: MEDICARE

## 2019-05-22 VITALS
WEIGHT: 116.38 LBS | HEIGHT: 64 IN | SYSTOLIC BLOOD PRESSURE: 183 MMHG | BODY MASS INDEX: 19.87 KG/M2 | DIASTOLIC BLOOD PRESSURE: 77 MMHG

## 2019-05-22 DIAGNOSIS — N18.4 ANEMIA OF CHRONIC RENAL FAILURE, STAGE 4 (SEVERE): ICD-10-CM

## 2019-05-22 DIAGNOSIS — N18.5 ANEMIA IN STAGE 5 CHRONIC KIDNEY DISEASE: Primary | ICD-10-CM

## 2019-05-22 DIAGNOSIS — D63.1 ANEMIA IN STAGE 5 CHRONIC KIDNEY DISEASE: Primary | ICD-10-CM

## 2019-05-22 DIAGNOSIS — D63.1 ANEMIA OF CHRONIC RENAL FAILURE, STAGE 4 (SEVERE): ICD-10-CM

## 2019-05-22 PROCEDURE — 96372 THER/PROPH/DIAG INJ SC/IM: CPT | Mod: HCNC

## 2019-05-22 PROCEDURE — 63600175 PHARM REV CODE 636 W HCPCS: Mod: HCNC,JG | Performed by: INTERNAL MEDICINE

## 2019-05-22 RX ADMIN — DARBEPOETIN ALFA 40 MCG: 40 INJECTION, SOLUTION INTRAVENOUS; SUBCUTANEOUS at 01:05

## 2019-05-22 NOTE — PROGRESS NOTES
Ms. Arnold was notified that SPEP/ARNOLD/FLC was abnormal with elevated Kappa light chains. Ms. Arnold been notified of results; has been scheduled with [facility] on 6/6/19. Appt scheduled with Dr. Tariq on 5/29/19 at 230 pm, same day as PYP scan. Dr. Burdick was not available on this day. Discussed appt with Ms. Arnold; verbalized understanding.

## 2019-05-22 NOTE — PROGRESS NOTES
Hgb 8.7;Hct 27.5    No dose increase  PER PROTOCOL FORM DROD-188 due to being increased on 5/1/19.  Aranesp 40 mcg given and 3 week return appt set. Dr Epperson notified. He will order Iv Ferraheme x 2 doses to begin as soon as being approved.    Gfr 11.4/stage 5

## 2019-05-23 ENCOUNTER — TELEPHONE (OUTPATIENT)
Dept: PRIMARY CARE CLINIC | Facility: CLINIC | Age: 74
End: 2019-05-23

## 2019-05-23 NOTE — TELEPHONE ENCOUNTER
Pt states that yesterday at 6:28a.m. 184/73, at 8:26a.m. 201/87 and she went to the doctor around oneish it was 184 over something pt forgotten .pt states this morning it was 122/86 and she states she was whoozy, pt states she is more whoozy when it is low

## 2019-05-27 ENCOUNTER — TELEPHONE (OUTPATIENT)
Dept: NEPHROLOGY | Facility: CLINIC | Age: 74
End: 2019-05-27

## 2019-05-27 DIAGNOSIS — I50.33 ACUTE ON CHRONIC DIASTOLIC CONGESTIVE HEART FAILURE: ICD-10-CM

## 2019-05-27 RX ORDER — SODIUM CHLORIDE 0.9 % (FLUSH) 0.9 %
10 SYRINGE (ML) INJECTION
Status: CANCELLED | OUTPATIENT
Start: 2019-05-27

## 2019-05-27 RX ORDER — SODIUM CHLORIDE 0.9 % (FLUSH) 0.9 %
10 SYRINGE (ML) INJECTION
Status: CANCELLED | OUTPATIENT
Start: 2019-05-29

## 2019-05-27 RX ORDER — FUROSEMIDE 40 MG/1
TABLET ORAL
Qty: 90 TABLET | Refills: 3 | Status: SHIPPED | OUTPATIENT
Start: 2019-05-27 | End: 2020-01-03 | Stop reason: SDUPTHER

## 2019-05-27 RX ORDER — HEPARIN 100 UNIT/ML
500 SYRINGE INTRAVENOUS
Status: CANCELLED | OUTPATIENT
Start: 2019-05-29

## 2019-05-27 RX ORDER — HEPARIN 100 UNIT/ML
500 SYRINGE INTRAVENOUS
Status: CANCELLED | OUTPATIENT
Start: 2019-05-27

## 2019-05-28 ENCOUNTER — NURSE TRIAGE (OUTPATIENT)
Dept: ADMINISTRATIVE | Facility: CLINIC | Age: 74
End: 2019-05-28

## 2019-05-28 ENCOUNTER — TELEPHONE (OUTPATIENT)
Dept: PRIMARY CARE CLINIC | Facility: CLINIC | Age: 74
End: 2019-05-28

## 2019-05-28 NOTE — PROGRESS NOTES
Adherence packed for 28 days using Dispill:     Morning card:  Asprin 81 mg  Carvedlol 25 mg  Citalopram 20 mg (1&1/2 tabs)  Famotidine 20 mg  Isosorbide Mononitrate ER 60 mg  Levothyroxine 75 mcg  Nifedipine ER Osmotic 60 mg  Sodium Bicarb 650 mg  Vitamin D 57801 IU (Once weekly in the Saturday pocket)        Evening card:  Atorvastatin 80 mg  Carvedilol 25 mg  Nifedipine ER Osmotic 60 mg  Sodium Bicarb 650 mg        PRN:  Doxazosin 2 mg

## 2019-05-28 NOTE — TELEPHONE ENCOUNTER
She can skip the nifedipine tonight and she should call the office tomorrow to let us know her pressures and symptoms.    Thanks,  KJ       Meaghan Burgess MA routed conversation to You 11 minutes ago (3:48 PM)      Meaghan Burgess MA 14 minutes ago (3:45 PM)         Pt states her pressure was okay and evening 115/65 she was feeling whoozy, she just took it at 3:15  117/53 she wants to know should she not take the Nifedipine tonight? Since its knid of low

## 2019-05-28 NOTE — TELEPHONE ENCOUNTER
Pt states her pressure was okay and evening 115/65 she was feeling whoozy, she just took it at 3:15  117/53 she wants to know should she not take the Nifedipine tonight? Since its knid of low

## 2019-05-29 ENCOUNTER — CLINICAL SUPPORT (OUTPATIENT)
Dept: CARDIOLOGY | Facility: CLINIC | Age: 74
End: 2019-05-29
Attending: INTERNAL MEDICINE
Payer: MEDICARE

## 2019-05-29 ENCOUNTER — OFFICE VISIT (OUTPATIENT)
Dept: TRANSPLANT | Facility: CLINIC | Age: 74
End: 2019-05-29
Attending: INTERNAL MEDICINE
Payer: MEDICARE

## 2019-05-29 VITALS
SYSTOLIC BLOOD PRESSURE: 159 MMHG | HEIGHT: 65 IN | BODY MASS INDEX: 18.83 KG/M2 | WEIGHT: 113 LBS | DIASTOLIC BLOOD PRESSURE: 62 MMHG | HEART RATE: 69 BPM

## 2019-05-29 DIAGNOSIS — I50.32 CHRONIC DIASTOLIC CONGESTIVE HEART FAILURE: Primary | ICD-10-CM

## 2019-05-29 DIAGNOSIS — I25.84 CORONARY ARTERY DISEASE DUE TO CALCIFIED CORONARY LESION: ICD-10-CM

## 2019-05-29 DIAGNOSIS — E85.4 CARDIAC AMYLOIDOSIS: ICD-10-CM

## 2019-05-29 DIAGNOSIS — I13.0 CARDIORENAL SYNDROME, STAGE 1-4 OR UNSPECIFIED CHRONIC KIDNEY DISEASE, WITH HEART FAILURE: ICD-10-CM

## 2019-05-29 DIAGNOSIS — I50.43 ACUTE ON CHRONIC COMBINED SYSTOLIC AND DIASTOLIC HEART FAILURE: ICD-10-CM

## 2019-05-29 DIAGNOSIS — N18.4 CKD (CHRONIC KIDNEY DISEASE), STAGE IV: ICD-10-CM

## 2019-05-29 DIAGNOSIS — I10 ESSENTIAL HYPERTENSION: ICD-10-CM

## 2019-05-29 DIAGNOSIS — I25.10 CORONARY ARTERY DISEASE DUE TO CALCIFIED CORONARY LESION: ICD-10-CM

## 2019-05-29 DIAGNOSIS — I43 CARDIAC AMYLOIDOSIS: ICD-10-CM

## 2019-05-29 LAB — OHS CV PLANAR SCORE: 2

## 2019-05-29 PROCEDURE — 99999 PR PBB SHADOW E&M-EST. PATIENT-LVL III: CPT | Mod: PBBFAC,HCNC,, | Performed by: INTERNAL MEDICINE

## 2019-05-29 PROCEDURE — 99999 PR PBB SHADOW E&M-EST. PATIENT-LVL I: CPT | Mod: PBBFAC,HCNC,,

## 2019-05-29 PROCEDURE — A9538 CV PYP ATTR W SPECT (CUPID ONLY): ICD-10-PCS | Mod: HCNC,S$GLB,, | Performed by: INTERNAL MEDICINE

## 2019-05-29 PROCEDURE — A9538 TC99M PYROPHOSPHATE: HCPCS | Mod: HCNC,S$GLB,, | Performed by: INTERNAL MEDICINE

## 2019-05-29 PROCEDURE — 99999 PR PBB SHADOW E&M-EST. PATIENT-LVL III: ICD-10-PCS | Mod: PBBFAC,HCNC,, | Performed by: INTERNAL MEDICINE

## 2019-05-29 PROCEDURE — 3077F SYST BP >= 140 MM HG: CPT | Mod: HCNC,CPTII,S$GLB, | Performed by: INTERNAL MEDICINE

## 2019-05-29 PROCEDURE — 78803 CV PYP ATTR W SPECT (CUPID ONLY): ICD-10-PCS | Mod: HCNC,S$GLB,, | Performed by: INTERNAL MEDICINE

## 2019-05-29 PROCEDURE — 99999 PR PBB SHADOW E&M-EST. PATIENT-LVL I: ICD-10-PCS | Mod: PBBFAC,HCNC,,

## 2019-05-29 PROCEDURE — 78803 RP LOCLZJ TUM SPECT 1 AREA: CPT | Mod: HCNC,S$GLB,, | Performed by: INTERNAL MEDICINE

## 2019-05-29 PROCEDURE — 1101F PT FALLS ASSESS-DOCD LE1/YR: CPT | Mod: HCNC,CPTII,S$GLB, | Performed by: INTERNAL MEDICINE

## 2019-05-29 PROCEDURE — 99214 OFFICE O/P EST MOD 30 MIN: CPT | Mod: HCNC,S$GLB,, | Performed by: INTERNAL MEDICINE

## 2019-05-29 PROCEDURE — 3077F PR MOST RECENT SYSTOLIC BLOOD PRESSURE >= 140 MM HG: ICD-10-PCS | Mod: HCNC,CPTII,S$GLB, | Performed by: INTERNAL MEDICINE

## 2019-05-29 PROCEDURE — 3078F DIAST BP <80 MM HG: CPT | Mod: HCNC,CPTII,S$GLB, | Performed by: INTERNAL MEDICINE

## 2019-05-29 PROCEDURE — 1101F PR PT FALLS ASSESS DOC 0-1 FALLS W/OUT INJ PAST YR: ICD-10-PCS | Mod: HCNC,CPTII,S$GLB, | Performed by: INTERNAL MEDICINE

## 2019-05-29 PROCEDURE — 3078F PR MOST RECENT DIASTOLIC BLOOD PRESSURE < 80 MM HG: ICD-10-PCS | Mod: HCNC,CPTII,S$GLB, | Performed by: INTERNAL MEDICINE

## 2019-05-29 PROCEDURE — 99214 PR OFFICE/OUTPT VISIT, EST, LEVL IV, 30-39 MIN: ICD-10-PCS | Mod: HCNC,S$GLB,, | Performed by: INTERNAL MEDICINE

## 2019-05-29 NOTE — TELEPHONE ENCOUNTER
Her pressure went way down to 113-115 systolic and she felt really woozy earlier today.  States this is low for her.  Her baseline is in the 130-140's range.   She feels mostly better now, but She wants to know what to do when this happens?   I advised that ideally, she should go in to be seen when this happens, but if she is not going to be seen, she should drink more fluids and lie down with her feet elevated above the level of her heart.    She then said she's on diuretics, and questioned if she should be drinking more fluids?    I reiterated that my recommendation is that any time she has a bp that is 20 points or more below her baseline and she feels woozy, she should go in to be seen.  She has an appt with cardiologist tomorrow, I will message her cardiologist to inform him of the symptoms she's experiencing.    Reason for Disposition   [1] Fall in systolic BP > 20 mm Hg from normal AND [2] dizzy, lightheaded, or weak    Protocols used: LOW BLOOD PRESSURE-A-AH

## 2019-05-29 NOTE — PATIENT INSTRUCTIONS
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  A heart biopsy could be performed for definite answer but I would not recommend this in your case.  There is an approved treatment using a drug called tafamadis taken by mouth.  I will request insurance give approval for this drug and inform you of any co-pays that you are responsible for.  The company has an assistance program if your income is low enough for this program.      TTR amyloid can be wild type, age related or inherited--passed along in your genes.  When passed along in the genes there is a 50-50 chance that your parents, brothers or sisters or children will carry this gene and MAY develop this disease.  If so they should be checked for the gene in appropriate cases at the appropriate time.  This is best answered in consultation with these family members.  Your blood test will answer whether you have the inherited form of this disease or the wild type.  Janeth will inform you when result is available.  The treatment of this condition is the same whether hereditary or not thus I will proceed with attempting to get you treatment.

## 2019-06-03 ENCOUNTER — TELEPHONE (OUTPATIENT)
Dept: PRIMARY CARE CLINIC | Facility: CLINIC | Age: 74
End: 2019-06-03

## 2019-06-03 DIAGNOSIS — M54.32 CHRONIC SCIATICA OF LEFT SIDE: Primary | ICD-10-CM

## 2019-06-03 NOTE — TELEPHONE ENCOUNTER
"Ray Burdick and Zhou,  Any advice on how to direct Ms Arnold regarding her wide swings in BP. Holding BP meds during symptomatic hypotensive episodes may worsen the periods of elevated BP. 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    Thanks,  Amarilys Johns MD/MPH  Internal Medicine  [facility]      GERALDINE Stauffer-C  Amarilys Johns MD             Ms. López BP has been fluctuating quite a bit over the last few days, from as low as 103/45 to as high as 189/78. Her BP this morning was 189/78 so she took her scheduled dose of Coreg, held her scheduled Nifedipine, and took PRN Doxazosin instead. When I got there this afternoon her BP was still 176/72. I recommended that she take Coreg and Nifedipine as scheduled and only take PRN Doxazosin if her BP remains over 150 after taking scheduled meds. Do you want to set any parameters for her to hold the Nifedipine and/or Coreg if her BP is low? She reports that she starts to feel lightheaded and woozy when her BP gets into the 110-115 range.     INDIO Stauffer, GERALDINE-C   [facility]        "

## 2019-06-04 ENCOUNTER — DOCUMENTATION ONLY (OUTPATIENT)
Dept: TRANSPLANT | Facility: CLINIC | Age: 74
End: 2019-06-04

## 2019-06-04 DIAGNOSIS — I43 CARDIAC AMYLOIDOSIS: Primary | ICD-10-CM

## 2019-06-04 DIAGNOSIS — E85.4 CARDIAC AMYLOIDOSIS: Primary | ICD-10-CM

## 2019-06-04 NOTE — TELEPHONE ENCOUNTER
Please advise the patient to stop the IMDUR. She can continue the nifedipine and coreg. With as-needed lasix and doxazosin.    Dr Burdick made these recs. She should call me or Portia with BP readings daily or any new symptoms.    Thanks,  KJ

## 2019-06-04 NOTE — PROGRESS NOTES
Vyndaquel (Tafamidis) started by Dr. Tariq on 5/29/19 for Ms. Ewelina Arnold as part of her treatment for cardiac amyloidosis. Enrollment forms signed by Ms. Arnold and Dr. Tariq and faxed to [employer] to begin process. Will follow up with prior authorization to her insurance [employer].  PYP was positive but TTR/DNA still pending. Additional labs added her labs scheduled on 6/6/19 which include send out lab for Pro-BNP and BNP, Troponin to establish baseline.

## 2019-06-05 ENCOUNTER — TELEPHONE (OUTPATIENT)
Dept: PRIMARY CARE CLINIC | Facility: CLINIC | Age: 74
End: 2019-06-05

## 2019-06-05 PROBLEM — E85.4 CARDIAC AMYLOIDOSIS: Status: ACTIVE | Noted: 2019-06-05

## 2019-06-05 PROBLEM — I43 CARDIAC AMYLOIDOSIS: Status: ACTIVE | Noted: 2019-06-05

## 2019-06-05 NOTE — TELEPHONE ENCOUNTER
Notified pt of the following per PCP request...  Please advise the patient to stop the IMDUR. She can continue the nifedipine and coreg. With as-needed lasix and doxazosin.     Dr Burdick made these recs. She should call me or Portia with BP readings daily or any new symptoms.     Thanks,  KJ

## 2019-06-05 NOTE — PROGRESS NOTES
Subjective:     Patient ID:  Ewelina Arnold is a 73 y.o. female who presents for evaluation of Congestive Heart Failure (Amyloidosis referral from Dr. Ly. Patient complains of increased SOB and fatigued.)    HPI:  73-year-old female known to Dr. LYNN Burdick has aortic stenosis, CHFpEF, HBP and is undergoing evaluation for cardiac amyloidosis.  Today she had borderline positive pyrophosphate scan of the heart which would be consistent with but not diagnostic of TTR amyloidosis since it is borderline.  Her genetic testing is pending at time of her visit.    She attempts to remain active but reports that she had to give up exercising.  She has [place] heart Association class 3 heart failure symptoms.  She sleeps on 2 pillows no definite PND spells.  Intermittent edema.  No presyncope or syncope.  She has experienced diarrhea for the past 2 weeks.  See has surgery for bilateral carpal tunnel syndrome.  He has chronic kidney disease and previously underwent a nephrectomy as she donated 1 of her kidneys to her brother.  Has history of colon cancer operated twice.    Review of Systems   Constitution: Positive for malaise/fatigue and weight loss (Weight has fallen from 130 lb to 113 of the past couple years.). Negative for chills, fever, night sweats and weight gain.   Cardiovascular: Positive for dyspnea on exertion, leg swelling and orthopnea. Negative for chest pain, irregular heartbeat, near-syncope, palpitations, paroxysmal nocturnal dyspnea and syncope.   Respiratory: Negative for cough, sputum production and wheezing.    Hematologic/Lymphatic: Does not bruise/bleed easily.   Musculoskeletal: Negative for arthritis, joint pain and stiffness.   Gastrointestinal: Positive for diarrhea (recent spell for 2 weeks). Negative for change in bowel habit, constipation, hematochezia and melena.   Genitourinary: Negative for hematuria.   Neurological: Positive for numbness (median nerve distribution bilaterally) and paresthesias  "(median nerve distribution bilaterally). Negative for brief paralysis, focal weakness, seizures and weakness.        On exam today she has trouble getting out of chair but has preserved strength of the upper extremities     Objective:   Physical Exam   Constitutional: She is oriented to person, place, and time. She appears well-developed and well-nourished.   BP (!) 159/62 (BP Location: Right arm, Patient Position: Sitting, BP Method: Small (Automatic))   Pulse 69   Ht 5' 4.5" (1.638 m)   Wt 51.3 kg (113 lb)   LMP  (LMP Unknown) Comment: 99  BMI 19.10 kg/m²    HENT:   Head: Normocephalic and atraumatic.   Eyes: Conjunctivae are normal. Right eye exhibits no discharge. Left eye exhibits no discharge. No scleral icterus.   Neck: No JVD (just at 10 cm) present. No thyromegaly present.   Cardiovascular: Normal rate, regular rhythm and normal heart sounds. Exam reveals no gallop.   No murmur heard.  Pulmonary/Chest: Effort normal and breath sounds normal.   Abdominal: Soft. Bowel sounds are normal. She exhibits no distension and no mass. There is no tenderness. There is no rebound and no guarding.   Liver span normal 10 cm   Musculoskeletal: She exhibits edema (Very mild pitting edema). She exhibits no tenderness.   Neurological: She is alert and oriented to person, place, and time.   Skin: Skin is warm and dry.   Psychiatric: She has a normal mood and affect. Her behavior is normal.      5/29/19 PYP Scan Conclusion      · Study is positive for TTR amyloidosis. Borderline uptake ratio of 1.18.  · Planar imaging demonstrates cardiac activity that is the same as the adjacent rib cage.  · SPECT imaging shows focal myocardial uptake in the basal to distal anterior wall and the basal inferoseptal wall.        4/11/19 2 D ECHO Conclusion   · Normal left ventricular systolic function. The estimated ejection fraction is 68%  · Moderate concentric left ventricular hypertrophy.  · Grade II (moderate) left ventricular " diastolic dysfunction consistent with pseudonormalization.  · Severe left atrial enlargement.  · Elevated left atrial pressure.  · Normal right ventricular systolic function.  · Mild right atrial enlargement.  · Moderate aortic valve stenosis.  · Aortic valve area is 1.24 cm2; peak velocity is 4.18 m/s; mean gradient is 40.53 mmHg.  · Mild-to-moderate aortic regurgitation.  · Mild mitral sclerosis.  · Mild-to-moderate mitral regurgitation.  · Mild tricuspid regurgitation.  · Mild to moderate pulmonic regurgitation.  · Normal central venous pressure (3 mm Hg).  · The estimated PA systolic pressure is 39 mm Hg  · Trivial Pericardial effusion.        Lab Results   Component Value Date    BNP 2,539 (H) 05/10/2019     (L) 05/10/2019    K 3.9 05/10/2019    CL 94 (L) 05/10/2019    CO2 30 (H) 05/10/2019    BUN 89 (H) 05/10/2019    CREATININE 4.2 (H) 05/10/2019    GLU 88 05/10/2019    AST 35 03/18/2019    ALT 14 03/18/2019    ALBUMIN 2.9 (L) 04/11/2019    PROT 7.2 03/18/2019    BILITOT 0.3 03/18/2019    HGB 8.7 (L) 05/22/2019    HCT 27.5 (L) 05/22/2019    TSH 1.798 01/25/2019    CHOL 181 08/28/2018    HDL 66 08/28/2018    LDLCALC 104.2 08/28/2018    TRIG 54 08/28/2018    FREET4 0.96 09/05/2018     No monoclonal spike  Assessment:     1. Chronic diastolic congestive heart failure    2. Cardiac amyloidosis    3. Coronary artery disease due to calcified coronary lesion    4. Essential hypertension    5. Cardiorenal syndrome, stage 1-4 or unspecified chronic kidney disease, with heart failure    6. CKD (chronic kidney disease), stage IV      Plan:   Your scan is borderline positive for TTR amyloidosis--abnormal protein that can be deposited in various heart tissues.  A heart biopsy could be performed for definite answer but I would not recommend this in your case.  There is an approved treatment using a drug called tafamadis taken by mouth.  I will request insurance give approval for this drug and inform you of any co-pays  that you are responsible for.  The company has an assistance program if your income is low enough for this program.      TTR amyloid can be wild type, age related or inherited--passed along in your genes.  When passed along in the genes there is a 50-50 chance that your parents, brothers or sisters or children will carry this gene and MAY develop this disease.  If so they should be checked for the gene in appropriate cases at the appropriate time.  This is best answered in consultation with these family members.  Your blood test will answer whether you have the inherited form of this disease or the wild type.  Janeth will inform you when result is available.  The treatment of this condition is the same whether hereditary or not thus I will proceed with attempting to get you treatment.     She is going to discuss all with her family and decide upon treatment.  I am bit reluctant perform cardiac biopsy view of her aortic stenosis and debilitated state.  The echo did not report strain pattern but in our hands apical sparing on the strain analysis has not been a reliable finding even in patients with known cardiac amyloidosis.  Cardiac MRI would be another noninvasive study it is  my personal bias to proceed with treatment as opposed performing more noninvasive tests.  This note will be forwarded to Gennaro for final decision as to whether to proceed with therapy or continue further testing.  She will be discussing all this with her family.  Her note will be forwarded to Dr. Burdick for his review.    Total duration of face to face visit time 50 minutes.  Total time spent counseling greater than fifty percent of total visit time.  Counseling included discussion regarding imaging findings, diagnosis, possibilities, treatment options, risks and benefits.  The patient had many questions regarding the options and long-term effects.

## 2019-06-05 NOTE — PROGRESS NOTES
"Subjective:       Patient ID: Ewelina Arnold is a 73 y.o. female.    Chief Complaint: Follow-up    HPI   Here for follow up for anemia. Last seen in 2017-see below. She was asked to return 3 months later but has been lost to follow up.   She is being seen now due to "elevated kappa light chains".     Of note, she recently was diagnosed with Cardiac Amyloidosis.   CV PYP 5/29/19 reveals:  · Study is positive for TTR amyloidosis. Borderline uptake ratio of 1.18.  · Planar imaging demonstrates cardiac activity that is the same as the adjacent rib cage.  · SPECT imaging shows focal myocardial uptake in the basal to distal anterior wall and the basal inferoseptal wall.  Most recent ECHO 4/2019 reveals:  · Normal left ventricular systolic function. The estimated ejection fraction is 68%  · Moderate concentric left ventricular hypertrophy.  · Grade II (moderate) left ventricular diastolic dysfunction consistent with pseudonormalization.  · Severe left atrial enlargement.  · Elevated left atrial pressure.  · Normal right ventricular systolic function.  · Mild right atrial enlargement.  · Moderate aortic valve stenosis.  · Aortic valve area is 1.24 cm2; peak velocity is 4.18 m/s; mean gradient is 40.53 mmHg.  · Mild-to-moderate aortic regurgitation.  · Mild mitral sclerosis.  · Mild-to-moderate mitral regurgitation.  · Mild tricuspid regurgitation.  · Mild to moderate pulmonic regurgitation.  · Normal central venous pressure (3 mm Hg).  · The estimated PA systolic pressure is 39 mm Hg  · Trivial Pericardial effusion.      Today,   She feels ok but fatigued.   Reports SOB on exertion- worse this past month.   No CP/palpitations.   No swelling.  No CHF.  Urinating ok with Lasix- followed by renal.   She lives alone and is independent in her ADL's but does feel like she is requiring more assistance.       Hematology History:    Initial consult with Dr. Pham 9/2017.   "Told she was anemic for about a year and states was told to " "take oral iron qd and then started taking bid about 1 month ago.  + weight loss noted over approximately 5 months ago (used to weight 140 lb and now 127 lbs)  States decreased appetite and not clear why  + Fatigue noted x 5-6 months. Reports sleeps a lot during the day.she does not sleep well at night however due to diuretics."    Last follow up was 11/2017:  "Returns for follow up of anemia work up labs and bmbx  Labs support diagnosis of anemia chronic disease  She has plasma cells noted but not meeting criteria for multiple myeloma     BMBx:  CELLULARITY=50-60%, TRILINEAGE HEMATOPOIETIC ACTIVITY (M/E= 2.1:1).  NO DEFINITIVE MORPHOLOGIC EVIDENCE OF PLASMA CELL DYSCRASIA. SEE COMMENT.  INCREASED STORAGE IRON.  ADEQUATE NUMBER OF MEGAKARYOCYTES.  COMMENT: Flow cytometry analysis of bone marrow aspirate shows lymph gate(8.8%) containing T and B cells.  No B cell clonality or T-cell aberrancy is evident. Blast gate is not increased. Plasma cell study shows no light  chain restricted plasma cell population.  Immunohistochemical studies were performed on the clot and core biopsy for further architecture evaluation with  adequate positive and negative controls. Scattered mixed predominantly T cells (CD3 positive) and B cells (CD20  positive, cyclin D1 negative) are evident. About 6-9% plasma cells ( positive) are noted. In situ hybridization  for kappa and lambda light chain shows polytypic plasma cells. Findings are nondiagnostic for lymphoma or  plasma cell dyscrasia. Correlate clinically and with a cytogenetics report.     Leukemia/Lymphoma flow negative  Normal cytogenetics"           Active Ambulatory Problems     Diagnosis Date Noted    Other forms of systemic lupus erythematosus 08/17/2012    Coronary artery disease due to calcified coronary lesion 10/05/2012    Post PTCA 10/05/2012    Hypertension 10/05/2012    High risk medication use - Both Eyes 11/27/2012    Residual stage angle-closure glaucoma - " Both Eyes 11/27/2012    Nuclear sclerosis - Both Eyes 05/29/2013    Thyroid nodule 05/13/2015    Acquired hypothyroidism 05/13/2015    Osteopenia 05/13/2015    Insufficiency of tear film of both eyes 09/30/2015    Aortic atherosclerosis     GERD (gastroesophageal reflux disease)     History of colon cancer     Dyslipidemia     Degeneration of lumbar or lumbosacral intervertebral disc 01/07/2016    Tortuous aorta 07/21/2016    CKD (chronic kidney disease), stage IV 01/23/2017    Positive dysphotopsia 02/13/2017    Weight loss, abnormal 03/16/2017    Pulmonary hypertension 05/16/2017    Secondary hyperparathyroidism of renal origin 05/16/2017    Peripheral arterial disease 05/16/2017    Major depressive disorder with single episode, in full remission 05/16/2017    Cervical spine arthritis 05/16/2017    Malnutrition of moderate degree 05/16/2017    Bilateral carotid artery disease 05/16/2017    Head trauma 09/19/2017    Microcytic anemia 10/16/2017    Aortic stenosis, moderate 11/18/2017    Systolic congestive heart failure 11/27/2017    Solitary kidney, acquired 11/30/2017    Arcuate scotoma of both eyes 12/13/2017    Cor pulmonale, chronic 01/16/2018    Impaired glucose tolerance 01/25/2018    Hyperaldosteronism 03/21/2018    History of unilateral nephrectomy LEFT 04/03/2018    Metabolic acidosis, normal anion gap (NAG) 04/03/2018    Renovascular hypertension 04/04/2018    Chronic diastolic heart failure 04/28/2018    Senile purpura 05/18/2018    Polypharmacy 05/18/2018    Anemia of chronic renal failure, stage 4 (severe) 06/26/2018    Hypovitaminosis D 06/28/2018    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) 06/28/2018    Synovial cyst of Right shoudler 06/28/2018    Vision changes 08/28/2018    Hyponatremia 09/28/2018    Acute heart failure with normal ejection fraction     Debility 10/05/2018    Cardiorenal syndrome, stage 1-4 or unspecified chronic kidney  disease, with heart failure 10/17/2018    Chronic sciatica of left side 12/18/2018    Bilateral temporomandibular joint pain 03/18/2019    Severe calcific aortic stenosis 04/24/2019    LVH (left ventricular hypertrophy) 04/24/2019    Cardiac amyloidosis 06/05/2019     Resolved Ambulatory Problems     Diagnosis Date Noted    Mixed dyslipidemia 10/05/2012    Hypothyroid 10/05/2012    Fullness of submandibular gland 11/12/2012    SLE (systemic lupus erythematosus) 11/27/2012    Edema 12/12/2012    Alopecia, other 12/19/2012    Acute myocardial infarction 12/28/2012    Chest pain syndrome 05/03/2013    Rectal bleed 05/09/2013    Chest pain at rest 03/27/2014    UTI (lower urinary tract infection) 04/17/2014    Diarrhea 10/09/2014    Hives 10/09/2014    History of polyuria 11/17/2014    Skin macule or macular rash 11/17/2014    Rash, skin 11/17/2014    Diarrhea 07/27/2015    Abscess 11/11/2015    Left ventricular diastolic dysfunction with preserved systolic function     PEDRO (dyspnea on exertion) 03/11/2016    Screening 04/14/2016    Leukopenia 04/25/2016    Caries 05/14/2014    CKD (chronic kidney disease) 05/16/2017    Anemia of other chronic disease 07/25/2017    Aortic stenosis 08/17/2017    Screen for colon cancer 09/14/2017    Acute on chronic diastolic congestive heart failure 11/17/2017    Acute on chronic kidney failure 11/18/2017    Elevated troponin 11/18/2017    Hypertensive emergency 01/14/2018    Vertigo 01/14/2018    Dizzy 01/14/2018    Aortic stenosis 01/16/2018    Headache 01/15/2018    Acute kidney injury superimposed on chronic kidney disease stage IV 01/21/2018    High aldosterone 01/25/2018    Hypertensive crisis 03/19/2018    MARYAM (acute kidney injury) 04/03/2018    Hyperkalemia 04/04/2018    Pulmonary edema cardiac cause 04/12/2018    Flash pulmonary edema 04/12/2018    Acute hypoxemic respiratory failure 04/28/2018    Breast cancer screening  06/12/2018    Fatigue 09/05/2018    Hypothermia 09/05/2018    Gastroenteritis, acute 09/07/2018     Past Medical History:   Diagnosis Date    Acute hypoxemic respiratory failure 4/28/2018    Acute on chronic diastolic congestive heart failure 11/17/2017    Allergy     Anatomical narrow angle glaucoma     Anemia     Aortic atherosclerosis     Arthritis     Cataract     CHF (congestive heart failure)     Chronic kidney disease     Chronic sciatica of left side     Coronary artery disease     Depression     Dry eyes     GERD (gastroesophageal reflux disease)     History of colon cancer     Hyperaldosteronism     Hyperlipidemia     Hypertension     Hypothyroidism     Keloid cicatrix     Left ventricular diastolic dysfunction with preserved systolic function     Lupus (systemic lupus erythematosus) 8/17/2012    Lupus (systemic lupus erythematosus)     Malnutrition 5/16/2017    Osteoarthritis of cervical spine 8/17/2012    Osteopenia     PAD (peripheral artery disease)     FRAN (renal artery stenosis)      FH:  + heart disease, DM    SH:  Prior assistant  Retired  Single, 2 children  Son- DM  Prior tobacco- quit 1985    Review of Systems   Constitutional: Positive for fatigue. Negative for activity change, appetite change, chills, fever and unexpected weight change.   HENT: Negative for congestion, hearing loss, mouth sores, nosebleeds, postnasal drip, rhinorrhea, sinus pressure, sinus pain, sneezing, sore throat and trouble swallowing.    Eyes: Negative for redness and visual disturbance (wears rx glasses).   Respiratory: Positive for shortness of breath (with exertion). Negative for cough and wheezing.    Cardiovascular: Negative for chest pain, palpitations and leg swelling.   Gastrointestinal: Negative for abdominal distention, abdominal pain, blood in stool, constipation, diarrhea, nausea and vomiting.   Genitourinary: Negative for decreased urine volume, difficulty urinating, dysuria,  frequency, hematuria and urgency.   Musculoskeletal: Positive for neck pain (has neck arthritis). Negative for back pain, gait problem, joint swelling, myalgias and neck stiffness.   Skin: Negative for color change, pallor and wound.   Neurological: Positive for numbness (hands, carpal tunnel). Negative for dizziness, weakness, light-headedness and headaches.   Hematological: Negative for adenopathy. Does not bruise/bleed easily.       Objective:      Physical Exam   Constitutional: She is oriented to person, place, and time. No distress.   Presents in WC.  Thin female  Temporal wasting   HENT:   Head: Normocephalic and atraumatic.   Right Ear: External ear normal.   Left Ear: External ear normal.   Nose: Nose normal.   Mouth/Throat: Oropharynx is clear and moist. No oropharyngeal exudate.   Eyes: Pupils are equal, round, and reactive to light. Conjunctivae and EOM are normal. Right eye exhibits no discharge. Left eye exhibits no discharge. No scleral icterus. Right pupil is round and reactive. Left pupil is round and reactive.   Neck: Normal range of motion. Neck supple. No JVD present. No tracheal deviation present. No thyromegaly present.   Cardiovascular: Normal rate, regular rhythm, normal heart sounds and intact distal pulses. Exam reveals no gallop and no friction rub.   No murmur heard.  Pulmonary/Chest: Effort normal and breath sounds normal. No respiratory distress. She has no wheezes. She has no rales.   Abdominal: Soft. Bowel sounds are normal. She exhibits no distension and no mass. There is no hepatosplenomegaly. There is no tenderness. There is no rebound and no guarding.   No organomegaly   Musculoskeletal: Normal range of motion. She exhibits no edema or tenderness.   Lymphadenopathy:        Head (right side): No submandibular adenopathy present.        Head (left side): No submandibular adenopathy present.     She has no cervical adenopathy.        Right cervical: No superficial cervical, no deep  cervical and no posterior cervical adenopathy present.       Left cervical: No superficial cervical, no deep cervical and no posterior cervical adenopathy present.        Right: No inguinal and no supraclavicular adenopathy present.        Left: No inguinal and no supraclavicular adenopathy present.   Neurological: She is alert and oriented to person, place, and time. She has normal strength and normal reflexes. No cranial nerve deficit or sensory deficit. Coordination normal.   Skin: Skin is warm and dry. No bruising, no lesion, no petechiae and no rash noted. She is not diaphoretic. No erythema. No pallor.   Psychiatric: She has a normal mood and affect. Her behavior is normal. Judgment and thought content normal. Her mood appears not anxious. She does not exhibit a depressed mood.   Nursing note and vitals reviewed.      Labs reviewed.   Imaging reviewed.   Path reviewed.   Assessment:       1. Cardiac amyloidosis    2. Iron deficiency anemia, unspecified iron deficiency anemia type    3. CKD (chronic kidney disease), stage IV    4. LVH (left ventricular hypertrophy)    5. Chronic diastolic heart failure    6. 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 No evidence of cardiac decompensation at this time. Clinically stable. She does report mild SOB on exertion. No CP, palpitations, edema, ascites, syncope, or bleeding noted.   No further testing from our standpoint. With her age and co morbidities, it is unlikely she will be offered chemotherapy but will need to be evaluated by Hematology. Referral placed. Patient verbalized understanding.  Discussed case with Dr. Pham who provided plan of care.    Of note, she is scheduled to have iron infusion this month.   Continue follow up with all other established Md's as scheduled.       Patient is in agreement with the proposed treatment  plan. All questions were answered to the patient's satisfaction. Pt knows to call clinic for any new or worsening symptoms and if anything is needed before the next clinic visit.      GERALDINE Shields-PARESH  Hematology & Oncology  [facility] [place] [address]  ph. [phone]  Fax. [phone]     I spent 40 minutes (face to face) with the patient, more than 50% was in counseling and coordination of care as detailed above.

## 2019-06-05 NOTE — TELEPHONE ENCOUNTER
----- Message from Kenzie Stewart sent at 6/5/2019 11:27 AM CDT -----  Contact: [phone]  Patient called to report that her BP at 11:15am was  145/67 pulse 66.    Please advise, thank you

## 2019-06-06 ENCOUNTER — TELEPHONE (OUTPATIENT)
Dept: PRIMARY CARE CLINIC | Facility: CLINIC | Age: 74
End: 2019-06-06

## 2019-06-06 ENCOUNTER — OFFICE VISIT (OUTPATIENT)
Dept: HEMATOLOGY/ONCOLOGY | Facility: CLINIC | Age: 74
End: 2019-06-06
Payer: MEDICARE

## 2019-06-06 ENCOUNTER — INFUSION (OUTPATIENT)
Dept: INFECTIOUS DISEASES | Facility: HOSPITAL | Age: 74
End: 2019-06-06
Attending: INTERNAL MEDICINE
Payer: MEDICARE

## 2019-06-06 VITALS
WEIGHT: 115.5 LBS | HEIGHT: 65 IN | DIASTOLIC BLOOD PRESSURE: 60 MMHG | BODY MASS INDEX: 19.24 KG/M2 | SYSTOLIC BLOOD PRESSURE: 124 MMHG | RESPIRATION RATE: 20 BRPM | OXYGEN SATURATION: 97 % | HEART RATE: 54 BPM | TEMPERATURE: 98 F

## 2019-06-06 VITALS
SYSTOLIC BLOOD PRESSURE: 136 MMHG | WEIGHT: 115.5 LBS | HEIGHT: 65 IN | DIASTOLIC BLOOD PRESSURE: 60 MMHG | BODY MASS INDEX: 19.24 KG/M2

## 2019-06-06 DIAGNOSIS — I43 CARDIAC AMYLOIDOSIS: Primary | ICD-10-CM

## 2019-06-06 DIAGNOSIS — I50.32 CHRONIC DIASTOLIC HEART FAILURE: ICD-10-CM

## 2019-06-06 DIAGNOSIS — I51.7 LVH (LEFT VENTRICULAR HYPERTROPHY): ICD-10-CM

## 2019-06-06 DIAGNOSIS — G63 NEUROPATHY DUE TO SLE (SYSTEMIC LUPUS ERYTHEMATOSUS): ICD-10-CM

## 2019-06-06 DIAGNOSIS — M32.9 NEUROPATHY DUE TO SLE (SYSTEMIC LUPUS ERYTHEMATOSUS): ICD-10-CM

## 2019-06-06 DIAGNOSIS — D50.9 IRON DEFICIENCY ANEMIA, UNSPECIFIED IRON DEFICIENCY ANEMIA TYPE: ICD-10-CM

## 2019-06-06 DIAGNOSIS — N18.4 ANEMIA OF CHRONIC RENAL FAILURE, STAGE 4 (SEVERE): Primary | ICD-10-CM

## 2019-06-06 DIAGNOSIS — D63.1 ANEMIA OF CHRONIC RENAL FAILURE, STAGE 4 (SEVERE): Primary | ICD-10-CM

## 2019-06-06 DIAGNOSIS — E85.4 CARDIAC AMYLOIDOSIS: Primary | ICD-10-CM

## 2019-06-06 DIAGNOSIS — N18.4 CKD (CHRONIC KIDNEY DISEASE), STAGE IV: ICD-10-CM

## 2019-06-06 PROCEDURE — 3078F PR MOST RECENT DIASTOLIC BLOOD PRESSURE < 80 MM HG: ICD-10-PCS | Mod: HCNC,CPTII,S$GLB, | Performed by: NURSE PRACTITIONER

## 2019-06-06 PROCEDURE — 99999 PR PBB SHADOW E&M-EST. PATIENT-LVL IV: CPT | Mod: PBBFAC,HCNC,, | Performed by: NURSE PRACTITIONER

## 2019-06-06 PROCEDURE — 1101F PT FALLS ASSESS-DOCD LE1/YR: CPT | Mod: HCNC,CPTII,S$GLB, | Performed by: NURSE PRACTITIONER

## 2019-06-06 PROCEDURE — 1101F PR PT FALLS ASSESS DOC 0-1 FALLS W/OUT INJ PAST YR: ICD-10-PCS | Mod: HCNC,CPTII,S$GLB, | Performed by: NURSE PRACTITIONER

## 2019-06-06 PROCEDURE — 96372 THER/PROPH/DIAG INJ SC/IM: CPT | Mod: HCNC

## 2019-06-06 PROCEDURE — 99215 PR OFFICE/OUTPT VISIT, EST, LEVL V, 40-54 MIN: ICD-10-PCS | Mod: HCNC,S$GLB,, | Performed by: NURSE PRACTITIONER

## 2019-06-06 PROCEDURE — 99499 UNLISTED E&M SERVICE: CPT | Mod: HCNC,S$GLB,, | Performed by: NURSE PRACTITIONER

## 2019-06-06 PROCEDURE — 99499 RISK ADDL DX/OHS AUDIT: ICD-10-PCS | Mod: HCNC,S$GLB,, | Performed by: NURSE PRACTITIONER

## 2019-06-06 PROCEDURE — 63600175 PHARM REV CODE 636 W HCPCS: Mod: HCNC,EC,JG | Performed by: INTERNAL MEDICINE

## 2019-06-06 PROCEDURE — 3078F DIAST BP <80 MM HG: CPT | Mod: HCNC,CPTII,S$GLB, | Performed by: NURSE PRACTITIONER

## 2019-06-06 PROCEDURE — 3074F SYST BP LT 130 MM HG: CPT | Mod: HCNC,CPTII,S$GLB, | Performed by: NURSE PRACTITIONER

## 2019-06-06 PROCEDURE — 3074F PR MOST RECENT SYSTOLIC BLOOD PRESSURE < 130 MM HG: ICD-10-PCS | Mod: HCNC,CPTII,S$GLB, | Performed by: NURSE PRACTITIONER

## 2019-06-06 PROCEDURE — 99215 OFFICE O/P EST HI 40 MIN: CPT | Mod: HCNC,S$GLB,, | Performed by: NURSE PRACTITIONER

## 2019-06-06 PROCEDURE — 99999 PR PBB SHADOW E&M-EST. PATIENT-LVL IV: ICD-10-PCS | Mod: PBBFAC,HCNC,, | Performed by: NURSE PRACTITIONER

## 2019-06-06 RX ORDER — HEPARIN 100 UNIT/ML
500 SYRINGE INTRAVENOUS
Status: CANCELLED | OUTPATIENT
Start: 2019-06-12

## 2019-06-06 RX ORDER — SODIUM CHLORIDE 0.9 % (FLUSH) 0.9 %
10 SYRINGE (ML) INJECTION
Status: CANCELLED | OUTPATIENT
Start: 2019-06-12

## 2019-06-06 RX ADMIN — DARBEPOETIN ALFA 40 MCG: 40 INJECTION, SOLUTION INTRAVENOUS; SUBCUTANEOUS at 09:06

## 2019-06-06 NOTE — TELEPHONE ENCOUNTER
----- Message from Kaylan Sagastume sent at 6/6/2019 12:48 PM CDT -----  Contact: self/[phone]  Patient called in regards providing blood pressure reading.   6/06/19 5:24 am 121/54 pulse 58  6/06/19 6:56 am 117/52 pulse 56  6/06/19 12:44 pm 130/56 pulse 65

## 2019-06-06 NOTE — PROGRESS NOTES
Hgb 8.5;Hct 26.6    No dose increase  PER PROTOCOL FORM DROD-188 due to Iron being low scheduled for Iron 6/10 and 6/17.  Aranesp 40 mcg given and 2 week return appt set due to Hgb under 9. Dr Epperson notified.     Gfr 11.4/stage 5

## 2019-06-06 NOTE — TELEPHONE ENCOUNTER
"Please reiterate to patient these instructions for her BP meds: nifedipine and coreg both twice daily. She can add the lasix for leg swelling and the doxazosin for elevated BP in the evening.    She can record her BP readings twice daily, about 1 hour after taking her BP meds. If she takes as "as needed" med, then check her BP again, about 1 hour after taking that med.    Please see if this makes sense to her. Also, how is she feeling? Still getting dizzy with the low readings?    Thanks,  KJ      Meaghan Burgess MA 20 hours ago (3:37 PM)         ----- Message from Candace Orlando sent at 6/5/2019  3:27 PM CDT -----  Contact: [phone]  Patient is calling to inform MD of her blood pressure reading for today 6/5 is 172/74.     Please advise, thanks          "

## 2019-06-06 NOTE — TELEPHONE ENCOUNTER
"Notified pt of the following instructions per PCP request...    Please reiterate to patient these instructions for her BP meds: nifedipine and coreg both twice daily. She can add the lasix for leg swelling and the doxazosin for elevated BP in the evening.     She can record her BP readings twice daily, about 1 hour after taking her BP meds. If she takes as "as needed" med, then check her BP again, about 1 hour after taking that med.     Please see if this makes sense to her. Also, how is she feeling? Still getting dizzy with the low readings?     Thanks,  KJ     Pt stated she understands the directives and said she wrote them down for future reference.  "

## 2019-06-10 ENCOUNTER — INFUSION (OUTPATIENT)
Dept: INFECTIOUS DISEASES | Facility: HOSPITAL | Age: 74
End: 2019-06-10
Attending: INTERNAL MEDICINE
Payer: MEDICARE

## 2019-06-10 ENCOUNTER — TELEPHONE (OUTPATIENT)
Dept: HEMATOLOGY/ONCOLOGY | Facility: CLINIC | Age: 74
End: 2019-06-10

## 2019-06-10 VITALS
OXYGEN SATURATION: 97 % | DIASTOLIC BLOOD PRESSURE: 61 MMHG | RESPIRATION RATE: 16 BRPM | WEIGHT: 115.5 LBS | HEART RATE: 49 BPM | TEMPERATURE: 98 F | BODY MASS INDEX: 19.52 KG/M2 | SYSTOLIC BLOOD PRESSURE: 123 MMHG

## 2019-06-10 DIAGNOSIS — D63.1 ANEMIA OF CHRONIC RENAL FAILURE, STAGE 4 (SEVERE): Primary | ICD-10-CM

## 2019-06-10 DIAGNOSIS — N18.4 ANEMIA OF CHRONIC RENAL FAILURE, STAGE 4 (SEVERE): Primary | ICD-10-CM

## 2019-06-10 PROCEDURE — 96365 THER/PROPH/DIAG IV INF INIT: CPT | Mod: HCNC

## 2019-06-10 PROCEDURE — 63600175 PHARM REV CODE 636 W HCPCS: Mod: HCNC,JG | Performed by: INTERNAL MEDICINE

## 2019-06-10 PROCEDURE — 25000003 PHARM REV CODE 250: Mod: HCNC | Performed by: INTERNAL MEDICINE

## 2019-06-10 RX ORDER — SODIUM CHLORIDE 0.9 % (FLUSH) 0.9 %
10 SYRINGE (ML) INJECTION
Status: CANCELLED | OUTPATIENT
Start: 2019-06-17

## 2019-06-10 RX ORDER — HEPARIN 100 UNIT/ML
500 SYRINGE INTRAVENOUS
Status: CANCELLED | OUTPATIENT
Start: 2019-06-17

## 2019-06-10 RX ORDER — SODIUM CHLORIDE 0.9 % (FLUSH) 0.9 %
10 SYRINGE (ML) INJECTION
Status: DISCONTINUED | OUTPATIENT
Start: 2019-06-10 | End: 2019-06-10 | Stop reason: HOSPADM

## 2019-06-10 RX ORDER — HEPARIN 100 UNIT/ML
500 SYRINGE INTRAVENOUS
Status: DISCONTINUED | OUTPATIENT
Start: 2019-06-10 | End: 2019-06-10 | Stop reason: HOSPADM

## 2019-06-10 RX ADMIN — FERUMOXYTOL 510 MG: 510 INJECTION INTRAVENOUS at 08:06

## 2019-06-10 NOTE — PROGRESS NOTES
Pt received #1/2 Feraheme infusion over 30 min., observed x 1 hr, tolerated well.  Left unit in NAD via w/c.

## 2019-06-10 NOTE — TELEPHONE ENCOUNTER
Spoke with patient.  Offered tomorrow but requested 6/17 as she relies on others for transport.    Gave directions.  -----------------------------------------------------------------------  ---- Message from Alicia Messer RN sent at 6/10/2019  8:11 AM CDT -----  This will be a new patient for Dr Ramirez or Dr Li.    Thank you    ---------------------- Message ------------------------------  From: Gerri Rojas  Sent: 6/10/2019   7:44 AM  To: Guillermo Henderson Staff, James Shaffer Staff    Can someone help me schedule this patient with  or  per PJ.     Message   Received: Yesterday   Message Contents   DEJUAN Patterson    Referral to Dr. Shaffer or Dr. Li for cardiac amyloidosis ASAP

## 2019-06-11 ENCOUNTER — PES CALL (OUTPATIENT)
Dept: ADMINISTRATIVE | Facility: CLINIC | Age: 74
End: 2019-06-11

## 2019-06-11 ENCOUNTER — DOCUMENTATION ONLY (OUTPATIENT)
Dept: TRANSPLANT | Facility: CLINIC | Age: 74
End: 2019-06-11

## 2019-06-11 ENCOUNTER — TELEPHONE (OUTPATIENT)
Dept: PRIMARY CARE CLINIC | Facility: CLINIC | Age: 74
End: 2019-06-11

## 2019-06-11 NOTE — PROGRESS NOTES
Vyndaqel ordered by Dr. 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 Eddy and patient.. 5/3/19: contacted [facility] since they  requested more data at which time, completed questionaire over the phone, Medical records with prior authorization forms . EOC ID # [id number] forms filled out and signed by Dr. Tariq; [facility] . 6/10/19, [facility] sent a denial letter but indicated that the decision could be appealed. Therefore [employer] appeal forms were sent by faxed that were completed and signed by Dr. Tariq on 6/10/19; faxed to [facility] later that day. Contactrf [facility] on 6/11/19 and confirmed that they did receive it and will begin process. Also received a fax from [employer] with a summary of benefits, Contacted [employer] and informed them that Vyndaqel was denied and that I started the appeals process. Therefore they sent me another form: [employer] Interim [employer] Prescription so that the patient could receive 60 days of the drug while it was in the appeals process, Form completed and signed by Dr. Tariq and faxed back to [employer] 6/11/19.

## 2019-06-11 NOTE — TELEPHONE ENCOUNTER
Please explain patient's BP med instructions to her as follows.     She should take nifedipine and coreg both twice daily. She can add the lasix for leg swelling and the doxazosin for elevated BP in the evening.    Have her BP recordings remained low in the morning and high in the evening? How is she feeling?    Thanks  KJ    Message from Yumi Celaya:  Ms Ewelina Arnold (MRN:[id number]) called our office, extremely frustrated, over her BP medications.   She is not understanding what to take when.   Her bp now is 180/67 and has not and will not take anything until she hears from you.

## 2019-06-11 NOTE — PROGRESS NOTES
"TTR/DNA test performed for Ms. Ewelina Arnold on 5/30/19; received results today on 6/11/19 which was NEGATIVE for familial TTR amyloidosis. Discussed results with Ms. Arnold. 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 Scheduled to follow up in BMT clinic for further evaluation of elevated FLC; Serum ARNOLD indicated polyclonal bands. Urine ARNOLD was not ordered. Ms. Arnold stated that she understands but "still trying to connect all the dots".   "

## 2019-06-13 ENCOUNTER — TELEPHONE (OUTPATIENT)
Dept: PRIMARY CARE CLINIC | Facility: CLINIC | Age: 74
End: 2019-06-13

## 2019-06-13 NOTE — TELEPHONE ENCOUNTER
So sorry I missed her call on yesterday. Spoke to pt today and she gave me this list of blood pressures. We discussed a new log with bp @ 10am - 2pm - 6p and 10pm .  How long is she supposed to take the Procardia bid ?    6/9/19 142/63-63  @730    165/68 @1600  178/73 @1713  2049  175/70-74  172/69-69   @ 1852     172/69-69   @ 1852   2220  186/76 172/69-69   @ 1852     6/10  @0539  104/51-53  felt really bad  0722  120/62-52 before breakfast,  1138a  140/62  1600 145/62-67  @2118 154/62-77   6/11 @0928 180/67  took Doxy 1216 169/91  @1545 193/73-69  took doxy @2015 152/70-66  @ 2228 138/58-66  6/12 @ 0652  148/62-56   1037  145/63-65  @1416 175/66-64  1755 166/66  @2138 161/91-69  6/13  @0837 166/73-70   Generally eats breakfast then takes am meds around 9am

## 2019-06-13 NOTE — TELEPHONE ENCOUNTER
Notified pt of the info listed below ad she said that she only has symtoms (weakness and overall feel bad )when her bp is very low.

## 2019-06-13 NOTE — TELEPHONE ENCOUNTER
Procardia and coreg should be continued BID likely for the rest of her life, as long as she tolerates it.    She should continue collecting her BPs at the times you suggested. Is she having any symptoms with the lower readings?    She could try taking the cardura (doxazosin) for pressures >160/90. Otherwise the risk of overtreating her BP and causing her to feel bad is a serious concern.    Thanks,  ALISIA Cabrera MD   Caller: Unspecified (2 days ago,  1:40 PM)             So sorry I missed her call on yesterday. Spoke to pt today and she gave me this list of blood pressures. We discussed a new log with bp @ 10am - 2pm - 6p and 10pm .  How long is she supposed to take the Procardia bid ?     6/9/19 142/63-63  @730    165/68 @1600  178/73 @1713  2049  175/70-74  172/69-69   @ 1852     172/69-69   @ 1852   2220  186/76 172/69-69   @ 1852     6/10  @0539  104/51-53  felt really bad  0722  120/62-52 before breakfast,  1138a  140/62  1600 145/62-67  @2118 154/62-77   6/11 @0928 180/67  took Doxy 1216 169/91  @1545 193/73-69  took doxy @2015 152/70-66  @ 2228 138/58-66   6/12 @ 0652  148/62-56   1037  145/63-65  @1416 175/66-64  1755 166/66  @2138 161/91-69   6/13  @0837 166/73-70   Generally eats breakfast then takes am meds around 9am

## 2019-06-14 ENCOUNTER — TELEPHONE (OUTPATIENT)
Dept: INTERNAL MEDICINE | Facility: CLINIC | Age: 74
End: 2019-06-14

## 2019-06-14 NOTE — TELEPHONE ENCOUNTER
Pt states she is not clear on how to take take her medication when her blood is getting over 160. That is all she wants to know

## 2019-06-14 NOTE — TELEPHONE ENCOUNTER
----- Message from Selam Carmichael sent at 6/14/2019  3:12 PM CDT -----  Contact: patient [phone]  Pt called again and said she left a message earlier with the info.

## 2019-06-17 ENCOUNTER — INFUSION (OUTPATIENT)
Dept: INFECTIOUS DISEASES | Facility: HOSPITAL | Age: 74
End: 2019-06-17
Attending: INTERNAL MEDICINE
Payer: MEDICARE

## 2019-06-17 ENCOUNTER — TELEPHONE (OUTPATIENT)
Dept: PRIMARY CARE CLINIC | Facility: CLINIC | Age: 74
End: 2019-06-17

## 2019-06-17 ENCOUNTER — INITIAL CONSULT (OUTPATIENT)
Dept: HEMATOLOGY/ONCOLOGY | Facility: CLINIC | Age: 74
End: 2019-06-17
Payer: MEDICARE

## 2019-06-17 ENCOUNTER — OFFICE VISIT (OUTPATIENT)
Dept: PRIMARY CARE CLINIC | Facility: CLINIC | Age: 74
End: 2019-06-17
Payer: MEDICARE

## 2019-06-17 VITALS
HEART RATE: 64 BPM | WEIGHT: 115.06 LBS | RESPIRATION RATE: 20 BRPM | DIASTOLIC BLOOD PRESSURE: 63 MMHG | HEIGHT: 65 IN | SYSTOLIC BLOOD PRESSURE: 138 MMHG | BODY MASS INDEX: 19.17 KG/M2 | OXYGEN SATURATION: 97 % | TEMPERATURE: 98 F

## 2019-06-17 VITALS
HEIGHT: 64 IN | DIASTOLIC BLOOD PRESSURE: 50 MMHG | WEIGHT: 114 LBS | RESPIRATION RATE: 17 BRPM | SYSTOLIC BLOOD PRESSURE: 138 MMHG | DIASTOLIC BLOOD PRESSURE: 65 MMHG | HEART RATE: 57 BPM | WEIGHT: 114.44 LBS | HEIGHT: 64 IN | TEMPERATURE: 99 F | OXYGEN SATURATION: 97 % | HEART RATE: 61 BPM | BODY MASS INDEX: 19.46 KG/M2 | BODY MASS INDEX: 19.54 KG/M2 | SYSTOLIC BLOOD PRESSURE: 118 MMHG | OXYGEN SATURATION: 99 %

## 2019-06-17 VITALS
WEIGHT: 114.44 LBS | SYSTOLIC BLOOD PRESSURE: 138 MMHG | HEIGHT: 64 IN | BODY MASS INDEX: 19.54 KG/M2 | DIASTOLIC BLOOD PRESSURE: 65 MMHG

## 2019-06-17 DIAGNOSIS — M32.19 OTHER SYSTEMIC LUPUS ERYTHEMATOSUS WITH OTHER ORGAN INVOLVEMENT: Chronic | ICD-10-CM

## 2019-06-17 DIAGNOSIS — D63.1 ANEMIA OF CHRONIC RENAL FAILURE, STAGE 4 (SEVERE): Primary | ICD-10-CM

## 2019-06-17 DIAGNOSIS — N18.4 ANEMIA OF CHRONIC RENAL FAILURE, STAGE 4 (SEVERE): Primary | ICD-10-CM

## 2019-06-17 DIAGNOSIS — I15.0 RENOVASCULAR HYPERTENSION: ICD-10-CM

## 2019-06-17 DIAGNOSIS — I43 CARDIAC AMYLOIDOSIS: ICD-10-CM

## 2019-06-17 DIAGNOSIS — E85.4 CARDIAC AMYLOIDOSIS: Primary | ICD-10-CM

## 2019-06-17 DIAGNOSIS — I43 CARDIAC AMYLOIDOSIS: Primary | ICD-10-CM

## 2019-06-17 DIAGNOSIS — E85.4 CARDIAC AMYLOIDOSIS: ICD-10-CM

## 2019-06-17 PROCEDURE — 3078F PR MOST RECENT DIASTOLIC BLOOD PRESSURE < 80 MM HG: ICD-10-PCS | Mod: HCNC,CPTII,S$GLB, | Performed by: INTERNAL MEDICINE

## 2019-06-17 PROCEDURE — 99215 PR OFFICE/OUTPT VISIT, EST, LEVL V, 40-54 MIN: ICD-10-PCS | Mod: HCNC,S$GLB,, | Performed by: INTERNAL MEDICINE

## 2019-06-17 PROCEDURE — 3078F DIAST BP <80 MM HG: CPT | Mod: HCNC,CPTII,S$GLB, | Performed by: INTERNAL MEDICINE

## 2019-06-17 PROCEDURE — 3075F SYST BP GE 130 - 139MM HG: CPT | Mod: HCNC,CPTII,S$GLB, | Performed by: INTERNAL MEDICINE

## 2019-06-17 PROCEDURE — 1101F PT FALLS ASSESS-DOCD LE1/YR: CPT | Mod: HCNC,CPTII,S$GLB, | Performed by: INTERNAL MEDICINE

## 2019-06-17 PROCEDURE — 63600175 PHARM REV CODE 636 W HCPCS: Mod: HCNC,JG | Performed by: INTERNAL MEDICINE

## 2019-06-17 PROCEDURE — 3075F PR MOST RECENT SYSTOLIC BLOOD PRESS GE 130-139MM HG: ICD-10-PCS | Mod: HCNC,CPTII,S$GLB, | Performed by: INTERNAL MEDICINE

## 2019-06-17 PROCEDURE — 1101F PR PT FALLS ASSESS DOC 0-1 FALLS W/OUT INJ PAST YR: ICD-10-PCS | Mod: HCNC,CPTII,S$GLB, | Performed by: INTERNAL MEDICINE

## 2019-06-17 PROCEDURE — 99999 PR PBB SHADOW E&M-EST. PATIENT-LVL V: ICD-10-PCS | Mod: PBBFAC,HCNC,, | Performed by: INTERNAL MEDICINE

## 2019-06-17 PROCEDURE — 25000003 PHARM REV CODE 250: Mod: HCNC | Performed by: INTERNAL MEDICINE

## 2019-06-17 PROCEDURE — 99999 PR PBB SHADOW E&M-EST. PATIENT-LVL V: CPT | Mod: PBBFAC,HCNC,, | Performed by: INTERNAL MEDICINE

## 2019-06-17 PROCEDURE — 96372 THER/PROPH/DIAG INJ SC/IM: CPT | Mod: HCNC

## 2019-06-17 PROCEDURE — 3074F SYST BP LT 130 MM HG: CPT | Mod: HCNC,CPTII,S$GLB, | Performed by: INTERNAL MEDICINE

## 2019-06-17 PROCEDURE — 99215 OFFICE O/P EST HI 40 MIN: CPT | Mod: HCNC,S$GLB,, | Performed by: INTERNAL MEDICINE

## 2019-06-17 PROCEDURE — 96365 THER/PROPH/DIAG IV INF INIT: CPT | Mod: HCNC

## 2019-06-17 PROCEDURE — 99214 OFFICE O/P EST MOD 30 MIN: CPT | Mod: HCNC,S$GLB,, | Performed by: INTERNAL MEDICINE

## 2019-06-17 PROCEDURE — 3074F PR MOST RECENT SYSTOLIC BLOOD PRESSURE < 130 MM HG: ICD-10-PCS | Mod: HCNC,CPTII,S$GLB, | Performed by: INTERNAL MEDICINE

## 2019-06-17 PROCEDURE — 99214 PR OFFICE/OUTPT VISIT, EST, LEVL IV, 30-39 MIN: ICD-10-PCS | Mod: HCNC,S$GLB,, | Performed by: INTERNAL MEDICINE

## 2019-06-17 RX ORDER — HEPARIN 100 UNIT/ML
500 SYRINGE INTRAVENOUS
Status: CANCELLED | OUTPATIENT
Start: 2019-06-24

## 2019-06-17 RX ORDER — SODIUM CHLORIDE 0.9 % (FLUSH) 0.9 %
10 SYRINGE (ML) INJECTION
Status: CANCELLED | OUTPATIENT
Start: 2019-06-24

## 2019-06-17 RX ORDER — SODIUM CHLORIDE 0.9 % (FLUSH) 0.9 %
10 SYRINGE (ML) INJECTION
Status: DISCONTINUED | OUTPATIENT
Start: 2019-06-17 | End: 2019-06-17 | Stop reason: HOSPADM

## 2019-06-17 RX ADMIN — DARBEPOETIN ALFA 40 MCG: 40 INJECTION, SOLUTION INTRAVENOUS; SUBCUTANEOUS at 10:06

## 2019-06-17 RX ADMIN — FERUMOXYTOL 510 MG: 510 INJECTION INTRAVENOUS at 10:06

## 2019-06-17 RX ADMIN — SODIUM CHLORIDE: 9 INJECTION, SOLUTION INTRAVENOUS at 08:06

## 2019-06-17 NOTE — ASSESSMENT & PLAN NOTE
Uncontrolled on coreg 25mg BID, nifedipine 60mg BID. PRN cardura. Isosorbide mononitrate 60mg stopped due to variable readings and symptomatic hypOtension  · Discharged from digital HTN program  · PCP to manage BP  · Pediatric BP cuff with high readings  · Home digital cuffs match clinic adult cuff readings  · Patient advised to continue readings with adult cuffs  · Lasix BID PRN  · Continue coreg 25mg BID, nifedipine 60mg BID, with PRN cardura 2mg  · HOLD isosorbide mononitrate 60mg based on Cardiology recs  · Advised patient to only check BP one hour after taking meds  · Will report back readings

## 2019-06-17 NOTE — PROGRESS NOTES
Hgb 8.9;Hct 29.3    No dose increase  PER PROTOCOL FORM DROD-188 due to Iron being low scheduled for Iron 6/10 and 6/17.  Aranesp 40 mcg given and 2 week return appt set due to Hgb under 9. Dr Epperson notified.     Gfr 11.4/stage 5

## 2019-06-17 NOTE — PATIENT INSTRUCTIONS
TODAY:  - take meds at 9am and 9pm  - collect BP readings at 10am (and if you want an extra reading only take it at 10pm)  - call office with BP readings after a few days  - PCP messaged Dr Ramirez ([facility]he[facility]) about rash and treatment  - PCP messaged amyloidosis nurse about insurance coverage for Vyndaqel  - appointment with Dr Burdick   + PCP messaged him with questions about TAVR eligibility with amyloidosis treatment

## 2019-06-17 NOTE — LETTER
June 17, 2019      Amarilys Johns MD  [address]  [facility] [address]           [facility]  [address]  [facility] [address]  Phone: [phone]          Patient: Ewelina Arnold   MR Number: [id number]   YOB: 1945   Date of Visit: 6/17/2019       Dear Dr. Amarilys Johns:    Thank you for referring Ewelina Arnold to me for evaluation. Attached you will find relevant portions of my assessment and plan of care.    If you have questions, please do not hesitate to call me. I look forward to following Ewelina Arnold along with you.    Sincerely,    Beatriz Li MD    Enclosure  CC:  No Recipients    If you would like to receive this communication electronically, please contact [email or web address] or [phone] to request more information on [employer] access.    For providers and/or their staff who would like to refer a patient to [facility], please contact us through our one-stop-shop provider referral line, Jovana Peters, at [phone].    If you feel you have received this communication in error or would no longer like to receive these types of communications, please e-mail [email or web address]

## 2019-06-17 NOTE — TELEPHONE ENCOUNTER
----- Message from Janeth Putnam sent at 6/17/2019  4:13 PM CDT -----  Dr. Johns,  Everything is in process to begin Vyndaqel. All documents have been submitted. Waiting for final information from drug company. They will negotiate a price with insurance company and then determine copayment for Ms. Arnold once all the financial documents are received from Ms. Arnold to provide assistance. In the mean time, [employer] should ship the drug to her home soon. Ms. Arnold was the 6th patient started on Vyndaqel.   I hope that everything will go smoothly. We are learning as we go.   Please let me know if you have and further questions or concerns.  Glad to answer any questions!  Janeth  ----- Message -----  From: Amarilys Johns MD  Sent: 6/17/2019  12:15 PM  To: Janeth Putnam    Dear Ms Figueroao,  Ms Arnold presented an insurance coverage letter for Vyndaqel in clinic today (its scanned into this note). Can you move forward her treatment?    Thanks,  Amarilys Johns MD/MPH  Internal Medicine  [facility]

## 2019-06-17 NOTE — PROGRESS NOTES
"Primary Care Provider Appointment    Subjective:      Patient ID: Ewelina Arnold is a 73 y.o. female with aortic stenosis, HTN, HLD, ESRD    Chief Complaint: Follow-up and Shortness of Breath (when pressure is low)    Patient has variable BP readings. At night it is significantly elevated and in the AM has low readings and is symptomatic. She collects BP readings sometimes when she has not taken her meds. Her readings are high often because she has not taken her scheduled meds when she collects her BP. She takes her meds usually at 9am and 9pm, but is collecting BP readings before her scheduled doses.    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 Dr Tariq's staff is working with [employer] on appeals for insurance, therefore has not started the med.    She is also getting iron injections with [facility] for ACI. Last injection was today.    Was being considered for TAVR but was put on hold for amyloidosis work-up. Patient expresses anxiety about this "my heart is closing." She complains of shortness of breath.    Has new onset rash on back and R arm, possibly related to lupus. Next appt with Dr Weston in 7/2019. Has h/o clabetosol prescribed by Dr Weston for scalp pruritis, but unclear if that is what is recommended for the rash.    Past Surgical History:   Procedure Laterality Date    BIOPSY-BONE MARROW Left 10/16/2017    Performed by Grant [facility]illan [facility] at [facility] OR 2ND FLR    CARDIAC CATHETERIZATION  07/27/2011    x1    CHOLECYSTECTOMY  1999    COLON SURGERY  2000 & 2003    partial removal for CA    COLONOSCOPY N/A 9/14/2017    Performed by Jose Steen [facility] at [facility] ENDO (4TH FLR)    COLONOSCOPY N/A 4/14/2016    [facility] at [facility] (4TH FLR)    COLONOSCOPY N/A 4/23/2013    Performed by [facility] at [facility] END[facility] (4TH FLR)    " TALIA-TRANSFORAMINAL Left 11/3/2016    [facility]    ESOPHAGOGASTRODUODENOSCOPY (EGD) N/A 3/16/2017    [facility]    EYE SURGERY      HAND SURGERY Bilateral 1996 & 1997    due to carpal tunnel     HERNIA REPAIR      HYSTERECTOMY  1983    INJECTION-STEROID-EPIDURAL-TRANSFORAMINAL Left 1/7/2016    [facility]    Left heart cath Left 12/20/2017    Performed by Chandan Ly 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    OOPHORECTOMY      removed one    Peripheral Iridotomy both eyes  1994    laser angle correction    THYROIDECTOMY, PARTIAL  2006    to remove two nodules and one-half thyroid       Past Medical History:   Diagnosis Date    Acute hypoxemic respiratory failure 4/28/2018    Acute on chronic diastolic congestive heart failure 11/17/2017    Allergy     Anatomical narrow angle glaucoma     Anemia     States diagnosed about a month ago    Aortic atherosclerosis     Arthritis     Cataract     CHF (congestive heart failure)     Chronic kidney disease     Chronic sciatica of left side     Right lower back pain due to sciatica    Coronary artery disease     Depression     Dry eyes     GERD (gastroesophageal reflux disease)     History of colon cancer     Hyperaldosteronism     Hyperlipidemia     Hypertension     Hypothyroidism     Keloid cicatrix     Left ventricular diastolic dysfunction with preserved systolic function     Lupus (systemic lupus erythematosus) 8/17/2012    Lupus (systemic lupus erythematosus)     Malnutrition 5/16/2017    Osteoarthritis of cervical spine 8/17/2012    Osteopenia     PAD (peripheral artery disease)     FRAN (renal artery stenosis)        Social History     Socioeconomic History    Marital status: Single     Spouse name: Not on file    Number of children: Not on file    Years of education: Not  "on file    Highest education level: Not on file   Occupational History     Employer: Retired    Social Needs    Financial resource strain: Not very hard    Food insecurity:     Worry: Never true     Inability: Never true    Transportation needs:     Medical: No     Non-medical: No   Tobacco Use    Smoking status: Former Smoker     Packs/day: 1.50     Years: 30.00     Pack years: 45.00     Types: Cigarettes     Start date:      Last attempt to quit: 3/13/1985     Years since quittin.2    Smokeless tobacco: Never Used   Substance and Sexual Activity    Alcohol use: No    Drug use: No    Sexual activity: Not Currently     Partners: Male     Birth control/protection: Abstinence   Lifestyle    Physical activity:     Days per week: Not on file     Minutes per session: Not on file    Stress: Not on file   Relationships    Social connections:     Talks on phone: Not on file     Gets together: Not on file     Attends Caodaism service: Not on file     Active member of club or organization: Not on file     Attends meetings of clubs or organizations: Not on file     Relationship status: Not on file   Other Topics Concern    Are you pregnant or think you may be? No    Breast-feeding No   Social History Narrative    Not on file       Review of Systems   Constitutional: Positive for activity change. Negative for appetite change.   Respiratory: Negative for cough and shortness of breath.    Cardiovascular: Negative for leg swelling.   Genitourinary: Negative for difficulty urinating and dysuria.   Skin: Positive for wound.   Neurological: Negative for dizziness and light-headedness.   Hematological: Bruises/bleeds easily.   Psychiatric/Behavioral: Negative for agitation, behavioral problems, confusion, decreased concentration and dysphoric mood.       Objective:   BP (!) 118/50   Pulse (!) 57   Ht 5' 4" (1.626 m)   Wt 51.7 kg (114 lb)   LMP  (LMP Unknown) Comment: 99  SpO2 97%   BMI 19.57 kg/m² "     Physical Exam   Constitutional: She is oriented to person, place, and time. She appears well-developed and well-nourished.   HENT:   Head: Normocephalic and atraumatic.   Neck: Normal range of motion.   Pulmonary/Chest: Effort normal.   Musculoskeletal: She exhibits deformity. She exhibits no edema.   Resolution of LE nodules   Neurological: She is alert and oriented to person, place, and time.   Psychiatric: She has a normal mood and affect. Her behavior is normal. Thought content normal.   Vitals reviewed.      Lab Results   Component Value Date    WBC 3.18 (L) 12/18/2018    HGB 8.9 (L) 06/17/2019    HCT 29.3 (L) 06/17/2019     12/18/2018    CHOL 181 08/28/2018    TRIG 54 08/28/2018    HDL 66 08/28/2018    ALT 14 03/18/2019    AST 35 03/18/2019     (L) 05/10/2019    K 3.9 05/10/2019    CL 94 (L) 05/10/2019    CREATININE 4.2 (H) 05/10/2019    BUN 89 (H) 05/10/2019    CO2 30 (H) 05/10/2019    TSH 1.798 01/25/2019    INR 1.0 09/05/2018    GLUF 79 12/02/2011    HGBA1C 5.2 09/25/2018         RASH ON POSTERIOR TORSO:      RASH ON R ARM          RESULTS: Reviewed labs and images today    Assessment:   73 y.o. female with multiple co-morbid illnesses here to continue work-up of chronic issues notably with aortic stenosis, HTN, HLD, ESRD     Plan:     Problem List Items Addressed This Visit        Cardiac/Vascular    Renovascular hypertension     Uncontrolled on coreg 25mg BID, nifedipine 60mg BID. PRN cardura. Isosorbide mononitrate 60mg stopped due to variable readings and symptomatic hypOtension  · Discharged from digital HTN program  · PCP to manage BP  · Pediatric BP cuff with high readings  · Home digital cuffs match clinic adult cuff readings  · Patient advised to continue readings with adult cuffs  · Lasix BID PRN  · Continue coreg 25mg BID, nifedipine 60mg BID, with PRN cardura 2mg  · HOLD isosorbide mononitrate 60mg based on Cardiology recs  · Advised patient to only check BP one hour after  "taking meds  · Will report back readings            Immunology/Multi System    Other forms of systemic lupus erythematosus (Chronic)     Diagnosed in 1990s, pos CARYN, SSB. 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 Total face-to-face time was 60 min, 50% of this was spent on counseling and coordination of care. The following issues were discussed: with aortic stenosis, HTN, HLD, ESRD    Amarilys Johns MD/MPH  Internal Medicine  [facility]  [phone]  "

## 2019-06-17 NOTE — ASSESSMENT & PLAN NOTE
Diagnosed in 1990s, pos CARYN, SSB. Biopsy showed cutaneous involvment  · Continue plaquenil  · Completed prednisone for erythema nodosum  · May need another course due to new rash  · F/U Rheum  · PCP messaged Dr Ramirez about new rash (pics in note)

## 2019-06-17 NOTE — PROGRESS NOTES
Subjective:       Patient ID: Ewelina Arnold is a 73 y.o. female.    Chief Complaint: Consult    Ewelina presents with her daughter for evaluation of elevated free light chains. [place] and lambda are both elevated, kappa > lambda with ration of 1.79. SPEP is normal. She was seen by both Dr. Montano and Dr. Pham 2 years ago for evaluation of anemia in the setting of chronic renal disease and chronic inflammatory disease (SLE). At that time kappa and lambda free light chains were also elevated with a normal ratio. At that time SPEP found an IgG lambda at 0.5 grams. Bone marrow biopsy was performed finding slight elevation in plasma cell at 6-8% without evidence of clonality. In the interim since this evaluation for anemia she has been diagnosed with TTR cardiac amyloidosis.      HPI  Review of Systems   Constitutional: Negative.    HENT: Negative.    Eyes: Negative.    Respiratory: Negative.    Cardiovascular: Negative.    Gastrointestinal: Negative.    Endocrine: Negative.    Genitourinary: Negative.    Musculoskeletal: Negative.    Skin: Negative.    Allergic/Immunologic: Negative for environmental allergies, food allergies and immunocompromised state.   Neurological: Negative.    Hematological: Negative for adenopathy. Does not bruise/bleed easily.   Psychiatric/Behavioral: Negative.        Objective:    By Observation:  Physical Exam   Constitutional: She is oriented to person, place, and time. She appears well-developed and well-nourished.   HENT:   Head: Normocephalic and atraumatic.   Eyes: Conjunctivae are normal. No scleral icterus.   Neck: Normal range of motion. Neck supple.   Cardiovascular: Normal rate and intact distal pulses.   Pulmonary/Chest: Effort normal. No respiratory distress.   Abdominal: Soft. She exhibits no distension. There is no tenderness.   Musculoskeletal: Normal range of motion. She exhibits no edema.   Neurological: She is alert and oriented to person, place, and time. No cranial nerve  deficit.   Skin: Skin is warm and dry.   Psychiatric: She has a normal mood and affect. Her behavior is normal.   Nursing note and vitals reviewed.      Assessment:       1. Cardiac amyloidosis        Plan:       Isolated elevated free light chains, resolution of IgG lambda previously measurable at 0.5 grams.   Pattern consistent with reactive process due to either CKD and/or SLE.  Half-life of free light chains is about 4 weeks. Since last test was 5/10/19 plan to repeat today.   If stable will continue to monitor, if pattern suggests progressive process will consider closer monitoring vs additional evaluation.  Discussed with patient and daughter. FLC added to labs check already scheduled for today.

## 2019-06-17 NOTE — PATIENT INSTRUCTIONS
"Visit today for abnormal test called "free light chains". This was also evaluated 2 year ago with a bone marrow biopsy that was normal. These "free light chains" are proteins made by your immune system and may become elevated when you have any kidney disease or autoimmune disease such as lupus. Today we will repeat the blood test for these "free light chains" to make sure they are stable. Elevation of these proteins are sometimes due to a blood cancer, but your normal bone marrow biopsy 2 years ago proved you do not have this cancer.  "

## 2019-06-17 NOTE — Clinical Note
Dear Ms Putnam,Ms Arnold presented an insurance coverage letter for Vyndaqel in clinic today (its scanned into this note). Can you move forward her treatment?Thanks,[facility]marilys Johns MD/[facility]HInternal MedicineO[facility]

## 2019-06-17 NOTE — ASSESSMENT & PLAN NOTE
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

## 2019-06-17 NOTE — Clinical Note
Dr Ramirez,Ms Clayton has a new pruritic rash on her back and arm (please see pics in note). You've prescribed a topical steroid in the past, but I'm not clear on whether that's what you want her to use at this time. She is requesting that I refill the clabetosol.Please direct her management if you can in advance of her appointment next month.Thanks,Amarilys Johns MD/[facility]HInternal MedicineO[facility]

## 2019-06-17 NOTE — PROGRESS NOTES
Pt received #2/2 Feraheme infusion over 30 min., observed x 1 hr, tolerated well.  Left unit in NAD via w/c.

## 2019-06-18 ENCOUNTER — TELEPHONE (OUTPATIENT)
Dept: OPHTHALMOLOGY | Facility: CLINIC | Age: 74
End: 2019-06-18

## 2019-06-18 ENCOUNTER — TELEPHONE (OUTPATIENT)
Dept: PRIMARY CARE CLINIC | Facility: CLINIC | Age: 74
End: 2019-06-18

## 2019-06-18 NOTE — TELEPHONE ENCOUNTER
Please let patient know that Dr Ramirez does want her to restart the topical steroids. He will call her to discuss which one.     If she doesn't hear back soon or wants something urgently, then let me know and I can refill his script of clabetosol.    Thanks,  KJ    ----- Message from Luciano Ramirez MD sent at 6/17/2019  6:29 PM CDT -----  Topical steroid sounds good; I will call her.  WD  ----- Message -----  From: Amarilys Johns MD  Sent: 6/17/2019  12:26 PM  To: MD Dr James Martinez,  Ms Arnold has a new pruritic rash on her back and arm (please see pics in note). You've prescribed a topical steroid in the past, but I'm not clear on whether that's what you want her to use at this time. She is requesting that I refill the clabetosol.    Please direct her management if you can in advance of her appointment next month.    Thanks,  Amarilys Johns MD/MPH  Internal Medicine  [facility]

## 2019-06-19 ENCOUNTER — TELEPHONE (OUTPATIENT)
Dept: INTERNAL MEDICINE | Facility: CLINIC | Age: 74
End: 2019-06-19

## 2019-06-19 ENCOUNTER — TELEPHONE (OUTPATIENT)
Dept: RHEUMATOLOGY | Facility: CLINIC | Age: 74
End: 2019-06-19

## 2019-06-19 ENCOUNTER — DOCUMENTATION ONLY (OUTPATIENT)
Dept: INTERNAL MEDICINE | Facility: CLINIC | Age: 74
End: 2019-06-19

## 2019-06-19 NOTE — TELEPHONE ENCOUNTER
Called the pt and let her know that dr dean did not have anything earlier and I placed her on the wait list.

## 2019-06-20 ENCOUNTER — TELEPHONE (OUTPATIENT)
Dept: RHEUMATOLOGY | Facility: CLINIC | Age: 74
End: 2019-06-20

## 2019-06-20 DIAGNOSIS — M32.19 OTHER SYSTEMIC LUPUS ERYTHEMATOSUS WITH OTHER ORGAN INVOLVEMENT: Chronic | ICD-10-CM

## 2019-06-20 NOTE — TELEPHONE ENCOUNTER
----- Message from Karyn Newlel sent at 6/20/2019  3:29 PM CDT -----  Contact: Self  Pt stated that she needs a call back in regards to medication, she stated she does not know the name the provider was supposed to look something up for her to take.    Contact pt @ [phone]

## 2019-06-21 ENCOUNTER — TELEPHONE (OUTPATIENT)
Dept: INTERNAL MEDICINE | Facility: CLINIC | Age: 74
End: 2019-06-21

## 2019-06-21 ENCOUNTER — TELEPHONE (OUTPATIENT)
Dept: PRIMARY CARE CLINIC | Facility: CLINIC | Age: 74
End: 2019-06-21

## 2019-06-21 NOTE — TELEPHONE ENCOUNTER
----- Message from Anthony Ribeiro sent at 6/21/2019 11:41 AM CDT -----  Contact: Patient [phone]  RX request - refill or new RX.  Is this a refill or new RX:  Refill  RX name and strength: Pill Pack   :    Pharmacy name and phone # [facility] [phone] (Phone)  [phone] (Fax)    Comments:  Patient would like to speak with office prior to filling the request regarding  BP readings.    Please call an advise  Thank you

## 2019-06-21 NOTE — TELEPHONE ENCOUNTER
----- Message from Lizz Nixon MA sent at 6/19/2019  1:58 PM CDT -----  Ms. Arnold called stating you were suppose to call her back about her medications. Said she is no longer taking the plaquinel and that you were suppose to have put her on something else for the rashes    Lizz

## 2019-06-21 NOTE — ASSESSMENT & PLAN NOTE
Recurrent cutaneous disease with SCLE since stopped HCQ due to eye changes  Her cutaneous disease is not severe enough to warrant thalidomide or lenalidomide and she does not have systemic lupus activity to indicated methotrexate or AZA or MMF.

## 2019-06-21 NOTE — TELEPHONE ENCOUNTER
Pt states she runs out of pill packs in about 5 days of her pill packs also June 19 a.m. Her bp was 142/58 w/ a 71 pulse. And later on that night around 10pm it was 144/62 with a 66 pulse. June 20 it was 133/58 w a 66 pulse and at 10p.m. it 134/58 w/ 68 pulse and this morning it was 136/62 with a 70 pulse.

## 2019-06-21 NOTE — TELEPHONE ENCOUNTER
I advised her to continue triamcinolone cream for now    I will request f/u appt with dermatology (Dr Pena) for her    Unfortunately, there are no other oral options for her cutaneous lupus as both chloroquine and quinacrine may have ocular toxicity as well. Her cutaneous disease is not severe enough to warrant thalidomide or lenalidomide and she does not have systemic lupus activity to indicated methotrexate or AZA or MMF.

## 2019-06-21 NOTE — TELEPHONE ENCOUNTER
Her BP readings are great! I will get her pill packs done with the following changes in cardiac meds:  - removal of Isosorbide Mononitrate ER 60 mg  - continuation of twice daily coreg and nifedipine  - cardura (doxazosin) is in extra bottle.    Please confirm that she is in agreement with those changes. Does she need more lasix from pharamcy?    Thanks,  WENDY Burgess MA 3 hours ago (11:48 AM)         Pt states she runs out of pill packs in about 5 days of her pill packs also June 19 a.m. Her bp was 142/58 w/ a 71 pulse. And later on that night around 10pm it was 144/62 with a 66 pulse. June 20 it was 133/58 w a 66 pulse and at 10p.m. it 134/58 w/ 68 pulse and this morning it was 136/62 with a 70 pulse.          Documentation       Meaghan Burgess MA 3 hours ago (11:48 AM)         ----- Message from [place] sent at 6/21/2019 11:41 AM CDT -----  Contact: Patient [phone]  RX request - refill or new RX.  Is this a refill or new RX:  Refill  RX name and strength: Pill Pack   :    Pharmacy name and phone # [facility]     [phone] (Phone)  [phone] (Fax)     Comments:  Patient would like to speak with office prior to filling the request regarding  BP readings.     Please call an advise  Thank you

## 2019-06-24 DIAGNOSIS — I13.0 CARDIORENAL SYNDROME, STAGE 1-4 OR UNSPECIFIED CHRONIC KIDNEY DISEASE, WITH HEART FAILURE: ICD-10-CM

## 2019-06-24 DIAGNOSIS — E85.4 CARDIAC AMYLOIDOSIS: Primary | ICD-10-CM

## 2019-06-24 DIAGNOSIS — I25.84 CORONARY ARTERY DISEASE DUE TO CALCIFIED CORONARY LESION: ICD-10-CM

## 2019-06-24 DIAGNOSIS — I1A.0 RESISTANT HYPERTENSION: ICD-10-CM

## 2019-06-24 DIAGNOSIS — I25.10 CORONARY ARTERY DISEASE DUE TO CALCIFIED CORONARY LESION: ICD-10-CM

## 2019-06-24 DIAGNOSIS — L93.2 CUTANEOUS LUPUS ERYTHEMATOSUS: Primary | ICD-10-CM

## 2019-06-24 DIAGNOSIS — I50.31 ACUTE HEART FAILURE WITH NORMAL EJECTION FRACTION: ICD-10-CM

## 2019-06-24 DIAGNOSIS — I43 CARDIAC AMYLOIDOSIS: Primary | ICD-10-CM

## 2019-06-24 RX ORDER — SODIUM BICARBONATE 650 MG/1
650 TABLET ORAL 2 TIMES DAILY
Qty: 180 TABLET | Refills: 3 | Status: SHIPPED | OUTPATIENT
Start: 2019-06-24 | End: 2019-10-02 | Stop reason: SDUPTHER

## 2019-06-24 RX ORDER — DOXAZOSIN 2 MG/1
2 TABLET ORAL NIGHTLY PRN
Qty: 90 TABLET | Refills: 3 | Status: SHIPPED | OUTPATIENT
Start: 2019-06-24

## 2019-06-24 RX ORDER — ATORVASTATIN CALCIUM 80 MG/1
80 TABLET, FILM COATED ORAL DAILY
Qty: 90 TABLET | Refills: 3 | Status: SHIPPED | OUTPATIENT
Start: 2019-06-24 | End: 2020-01-02

## 2019-06-24 RX ORDER — CLOBETASOL PROPIONATE 0.5 MG/G
CREAM TOPICAL 2 TIMES DAILY
Qty: 60 G | Refills: 1 | Status: SHIPPED | OUTPATIENT
Start: 2019-06-24

## 2019-06-25 ENCOUNTER — TELEPHONE (OUTPATIENT)
Dept: INFUSION THERAPY | Facility: HOSPITAL | Age: 74
End: 2019-06-25

## 2019-06-25 ENCOUNTER — TELEPHONE (OUTPATIENT)
Dept: DERMATOLOGY | Facility: CLINIC | Age: 74
End: 2019-06-25

## 2019-06-25 RX ORDER — FLUOCINONIDE 0.5 MG/G
OINTMENT TOPICAL 2 TIMES DAILY
Qty: 60 G | Refills: 2 | Status: SHIPPED | OUTPATIENT
Start: 2019-06-25

## 2019-06-25 NOTE — TELEPHONE ENCOUNTER
Called pt to inform her Dr. Pena sent new prescription for her. I informed her that if she does not get better with this cream she will have to come in for an appt with Dr. Pena. Pt thanked me for my help and call.

## 2019-06-25 NOTE — TELEPHONE ENCOUNTER
"Lilibeth Canseco, my apology.In my workqueue under "referring provider" on 6/9/19 this order was entered for Ms Arnold. Your name is there,so that is why I sent this message to you.  Thank you  "

## 2019-06-25 NOTE — PROGRESS NOTES
Adherence packed for 28 days using Dispill:     Morning card:  Asprin 81 mg  Carvedlol 25 mg  Citalopram 20 mg (1&1/2 tabs)  Famotidine 20 mg  Levothyroxine 75 mcg  Nifedipine ER Osmotic 60 mg  Sodium Bicarb 650 mg  Vitamin D 76011 IU (Once weekly in the Saturday pocket)        Evening card:  Atorvastatin 80 mg  Carvedilol 25 mg  Nifedipine ER Osmotic 60 mg  Sodium Bicarb 650 mg        PRN:  Doxazosin 2 mg (given in a separate bottle)    *Removed isosorbide mononitrate ER 60 mg

## 2019-06-26 ENCOUNTER — TELEPHONE (OUTPATIENT)
Dept: DERMATOLOGY | Facility: CLINIC | Age: 74
End: 2019-06-26

## 2019-06-26 ENCOUNTER — LAB VISIT (OUTPATIENT)
Dept: LAB | Facility: HOSPITAL | Age: 74
End: 2019-06-26
Attending: INTERNAL MEDICINE
Payer: MEDICARE

## 2019-06-26 DIAGNOSIS — N18.4 ANEMIA OF CHRONIC RENAL FAILURE, STAGE 4 (SEVERE): ICD-10-CM

## 2019-06-26 DIAGNOSIS — M32.19 OTHER SYSTEMIC LUPUS ERYTHEMATOSUS WITH OTHER ORGAN INVOLVEMENT: Chronic | ICD-10-CM

## 2019-06-26 DIAGNOSIS — D63.1 ANEMIA OF CHRONIC RENAL FAILURE, STAGE 4 (SEVERE): ICD-10-CM

## 2019-06-26 LAB
ALBUMIN SERPL BCP-MCNC: 2.6 G/DL (ref 3.5–5.2)
ALP SERPL-CCNC: 102 U/L (ref 55–135)
ALT SERPL W/O P-5'-P-CCNC: 7 U/L (ref 10–44)
ANION GAP SERPL CALC-SCNC: 13 MMOL/L (ref 8–16)
AST SERPL-CCNC: 26 U/L (ref 10–40)
BASOPHILS # BLD AUTO: 0.03 K/UL (ref 0–0.2)
BASOPHILS NFR BLD: 0.6 % (ref 0–1.9)
BILIRUB SERPL-MCNC: 0.3 MG/DL (ref 0.1–1)
BUN SERPL-MCNC: 90 MG/DL (ref 8–23)
CALCIUM SERPL-MCNC: 9 MG/DL (ref 8.7–10.5)
CHLORIDE SERPL-SCNC: 95 MMOL/L (ref 95–110)
CO2 SERPL-SCNC: 25 MMOL/L (ref 23–29)
CREAT SERPL-MCNC: 4.5 MG/DL (ref 0.5–1.4)
CRP SERPL-MCNC: 2.2 MG/L (ref 0–8.2)
DAT IGG-SP REAG RBC-IMP: NORMAL
DIFFERENTIAL METHOD: ABNORMAL
EOSINOPHIL # BLD AUTO: 0.5 K/UL (ref 0–0.5)
EOSINOPHIL NFR BLD: 9 % (ref 0–8)
ERYTHROCYTE [DISTWIDTH] IN BLOOD BY AUTOMATED COUNT: 15.8 % (ref 11.5–14.5)
ERYTHROCYTE [SEDIMENTATION RATE] IN BLOOD BY WESTERGREN METHOD: 34 MM/HR (ref 0–36)
EST. GFR  (AFRICAN AMERICAN): 10.5 ML/MIN/1.73 M^2
EST. GFR  (NON AFRICAN AMERICAN): 9.1 ML/MIN/1.73 M^2
FERRITIN SERPL-MCNC: 1790 NG/ML (ref 20–300)
GLUCOSE SERPL-MCNC: 65 MG/DL (ref 70–110)
HCT VFR BLD AUTO: 29 % (ref 37–48.5)
HGB BLD-MCNC: 8.9 G/DL (ref 12–16)
IMM GRANULOCYTES # BLD AUTO: 0.01 K/UL (ref 0–0.04)
IMM GRANULOCYTES NFR BLD AUTO: 0.2 % (ref 0–0.5)
IRON SERPL-MCNC: 45 UG/DL (ref 30–160)
LYMPHOCYTES # BLD AUTO: 0.6 K/UL (ref 1–4.8)
LYMPHOCYTES NFR BLD: 10.3 % (ref 18–48)
MCH RBC QN AUTO: 25.9 PG (ref 27–31)
MCHC RBC AUTO-ENTMCNC: 30.7 G/DL (ref 32–36)
MCV RBC AUTO: 85 FL (ref 82–98)
MONOCYTES # BLD AUTO: 0.6 K/UL (ref 0.3–1)
MONOCYTES NFR BLD: 11.4 % (ref 4–15)
NEUTROPHILS # BLD AUTO: 3.7 K/UL (ref 1.8–7.7)
NEUTROPHILS NFR BLD: 68.5 % (ref 38–73)
NRBC BLD-RTO: 0 /100 WBC
PLATELET # BLD AUTO: 356 K/UL (ref 150–350)
PMV BLD AUTO: 9.4 FL (ref 9.2–12.9)
POTASSIUM SERPL-SCNC: 4.2 MMOL/L (ref 3.5–5.1)
PROT SERPL-MCNC: 7.1 G/DL (ref 6–8.4)
RBC # BLD AUTO: 3.43 M/UL (ref 4–5.4)
SATURATED IRON: 22 % (ref 20–50)
SODIUM SERPL-SCNC: 133 MMOL/L (ref 136–145)
TOTAL IRON BINDING CAPACITY: 207 UG/DL (ref 250–450)
TRANSFERRIN SERPL-MCNC: 140 MG/DL (ref 200–375)
WBC # BLD AUTO: 5.42 K/UL (ref 3.9–12.7)

## 2019-06-26 PROCEDURE — 86140 C-REACTIVE PROTEIN: CPT | Mod: HCNC

## 2019-06-26 PROCEDURE — 85652 RBC SED RATE AUTOMATED: CPT | Mod: HCNC

## 2019-06-26 PROCEDURE — 83540 ASSAY OF IRON: CPT | Mod: HCNC

## 2019-06-26 PROCEDURE — 86225 DNA ANTIBODY NATIVE: CPT | Mod: HCNC

## 2019-06-26 PROCEDURE — 36415 COLL VENOUS BLD VENIPUNCTURE: CPT | Mod: HCNC,PO

## 2019-06-26 PROCEDURE — 86160 COMPLEMENT ANTIGEN: CPT | Mod: 59,HCNC

## 2019-06-26 PROCEDURE — 82728 ASSAY OF FERRITIN: CPT | Mod: HCNC

## 2019-06-26 PROCEDURE — 80053 COMPREHEN METABOLIC PANEL: CPT | Mod: HCNC

## 2019-06-26 PROCEDURE — 85025 COMPLETE CBC W/AUTO DIFF WBC: CPT | Mod: HCNC

## 2019-06-26 PROCEDURE — 86160 COMPLEMENT ANTIGEN: CPT | Mod: HCNC

## 2019-06-26 PROCEDURE — 86880 COOMBS TEST DIRECT: CPT | Mod: HCNC

## 2019-06-26 NOTE — TELEPHONE ENCOUNTER
Called pt to inform her Dr. Pena sent over new prescription. Advised pt to call me back if medication is too expensive. Pt thanked me for my call.----- Message from Cristina Pena MD sent at 6/25/2019  4:22 PM CDT -----  Contact: pt at [phone]  Sent in an alternative, fluocinonide.    Please notify patient.    ----- Message -----  From: Chloe Berry MA  Sent: 6/25/2019   3:25 PM  To: Cristina Pena MD    Pt medication too expensive.   ----- Message -----  From: Amarilys Foreman  Sent: 6/25/2019   3:12 PM  To: Becky Evans Staff    Needs Advice    Reason for call:  Med prescribed today is too expensive.  Cost $99.00.. Needs something more affordable.  Pt uses Ziggy sanon mentdemarior.  There is only one.  Med is mometasone furate 0.1% ointment.     Pt has Tac cream 0.1% prescribed by someone else and it is not working either.         Communication Preference: Call pt today and discuss other affordable options    Additional Information:

## 2019-06-27 ENCOUNTER — TELEPHONE (OUTPATIENT)
Dept: PRIMARY CARE CLINIC | Facility: CLINIC | Age: 74
End: 2019-06-27

## 2019-06-27 LAB
C3 SERPL-MCNC: 115 MG/DL (ref 50–180)
C4 SERPL-MCNC: 27 MG/DL (ref 11–44)
DSDNA AB SER-ACNC: NORMAL [IU]/ML

## 2019-06-27 NOTE — TELEPHONE ENCOUNTER
Please advise patient that she is very malnourished on her labs. Is she eating enough protein? Has she ever tried Nepro or ensure or as a food supplement? I advise that she drinks 2-3 Nepro supplements daily. They are expensive, can her children help her with that?    Thanks,  KJ

## 2019-06-27 NOTE — TELEPHONE ENCOUNTER
----- Message from Luciano Ramirez MD sent at 6/27/2019  9:22 AM CDT -----  She is scheduled to see me in 2 weeks; please review CMP  WD

## 2019-06-30 NOTE — TELEPHONE ENCOUNTER
Please let patient know that she does have some fluid in her lungs. She should continue lasix (if her blood pressure tolerates it).     Her blood count was stable, but I will still reach out to Dr Epperson about whether an iron infusion is warranted.    Please have her call the office with her BP readings.    Thanks,  WENDY  
complains of pain/discomfort

## 2019-07-01 ENCOUNTER — INFUSION (OUTPATIENT)
Dept: INFECTIOUS DISEASES | Facility: HOSPITAL | Age: 74
End: 2019-07-01
Attending: INTERNAL MEDICINE
Payer: MEDICARE

## 2019-07-01 VITALS
SYSTOLIC BLOOD PRESSURE: 120 MMHG | DIASTOLIC BLOOD PRESSURE: 57 MMHG | WEIGHT: 110.69 LBS | BODY MASS INDEX: 18.44 KG/M2 | HEIGHT: 65 IN

## 2019-07-01 DIAGNOSIS — D63.1 ANEMIA OF CHRONIC RENAL FAILURE, STAGE 4 (SEVERE): Primary | ICD-10-CM

## 2019-07-01 DIAGNOSIS — N18.4 ANEMIA OF CHRONIC RENAL FAILURE, STAGE 4 (SEVERE): Primary | ICD-10-CM

## 2019-07-01 PROCEDURE — 96372 THER/PROPH/DIAG INJ SC/IM: CPT | Mod: HCNC

## 2019-07-01 PROCEDURE — 63600175 PHARM REV CODE 636 W HCPCS: Mod: HCNC,JG | Performed by: INTERNAL MEDICINE

## 2019-07-01 RX ORDER — HEPARIN 100 UNIT/ML
500 SYRINGE INTRAVENOUS
Status: CANCELLED | OUTPATIENT
Start: 2019-07-08

## 2019-07-01 RX ADMIN — DARBEPOETIN ALFA 60 MCG: 60 INJECTION, SOLUTION INTRAVENOUS; SUBCUTANEOUS at 11:07

## 2019-07-01 NOTE — PROGRESS NOTES
Hgb 8.9;Hct 29.0    Dose increased  PER PROTOCOL FORM DROD-188 from aranesp 40 mcg to Aranesp 60 mcg.  aranesp 60 mcg given and 2 week return appt set due to Hgb under 9.  Dr ferrara notified.    Gfr 10.5/stage 5

## 2019-07-02 NOTE — PLAN OF CARE
Discharge planning: Plan for home health discussed with patient. Her choice is [facility]. Referral placed.Walker ordered for home.    25.8

## 2019-07-03 ENCOUNTER — TELEPHONE (OUTPATIENT)
Dept: TRANSPLANT | Facility: CLINIC | Age: 74
End: 2019-07-03

## 2019-07-03 ENCOUNTER — DOCUMENTATION ONLY (OUTPATIENT)
Dept: TRANSPLANT | Facility: CLINIC | Age: 74
End: 2019-07-03

## 2019-07-03 ENCOUNTER — TELEPHONE (OUTPATIENT)
Dept: PHARMACY | Facility: CLINIC | Age: 74
End: 2019-07-03

## 2019-07-03 PROBLEM — E85.82 WILD-TYPE TRANSTHYRETIN-RELATED (ATTR) AMYLOIDOSIS: Status: ACTIVE | Noted: 2019-07-03

## 2019-07-03 NOTE — TELEPHONE ENCOUNTER
Spoke to Ms. Clayton today to ask if she has been contacted by [facility] will be providing Vyndaqel for cardiac wild-type TTR amyloidosis. She indicated that someone has called; understands once process is complete, Vyndaqel will be delivered to her home

## 2019-07-03 NOTE — TELEPHONE ENCOUNTER
Informed Patient  that [facility] received prescription for Vyndaqel and prior authorization is required.  [facility] will be back in touch once insurance determination is received.

## 2019-07-08 ENCOUNTER — TELEPHONE (OUTPATIENT)
Dept: PHARMACY | Facility: CLINIC | Age: 74
End: 2019-07-08

## 2019-07-08 ENCOUNTER — OFFICE VISIT (OUTPATIENT)
Dept: RHEUMATOLOGY | Facility: CLINIC | Age: 74
End: 2019-07-08
Payer: MEDICARE

## 2019-07-08 VITALS
BODY MASS INDEX: 19.42 KG/M2 | HEART RATE: 51 BPM | WEIGHT: 113.75 LBS | HEIGHT: 64 IN | DIASTOLIC BLOOD PRESSURE: 56 MMHG | SYSTOLIC BLOOD PRESSURE: 103 MMHG

## 2019-07-08 DIAGNOSIS — M32.19 OTHER SYSTEMIC LUPUS ERYTHEMATOSUS WITH OTHER ORGAN INVOLVEMENT: Primary | Chronic | ICD-10-CM

## 2019-07-08 PROCEDURE — 3078F PR MOST RECENT DIASTOLIC BLOOD PRESSURE < 80 MM HG: ICD-10-PCS | Mod: HCNC,CPTII,S$GLB, | Performed by: INTERNAL MEDICINE

## 2019-07-08 PROCEDURE — 99999 PR PBB SHADOW E&M-EST. PATIENT-LVL IV: ICD-10-PCS | Mod: PBBFAC,HCNC,, | Performed by: INTERNAL MEDICINE

## 2019-07-08 PROCEDURE — 1101F PR PT FALLS ASSESS DOC 0-1 FALLS W/OUT INJ PAST YR: ICD-10-PCS | Mod: HCNC,CPTII,S$GLB, | Performed by: INTERNAL MEDICINE

## 2019-07-08 PROCEDURE — 99499 RISK ADDL DX/OHS AUDIT: ICD-10-PCS | Mod: HCNC,S$GLB,, | Performed by: INTERNAL MEDICINE

## 2019-07-08 PROCEDURE — 3074F PR MOST RECENT SYSTOLIC BLOOD PRESSURE < 130 MM HG: ICD-10-PCS | Mod: HCNC,CPTII,S$GLB, | Performed by: INTERNAL MEDICINE

## 2019-07-08 PROCEDURE — 99499 UNLISTED E&M SERVICE: CPT | Mod: HCNC,S$GLB,, | Performed by: INTERNAL MEDICINE

## 2019-07-08 PROCEDURE — 1101F PT FALLS ASSESS-DOCD LE1/YR: CPT | Mod: HCNC,CPTII,S$GLB, | Performed by: INTERNAL MEDICINE

## 2019-07-08 PROCEDURE — 99999 PR PBB SHADOW E&M-EST. PATIENT-LVL IV: CPT | Mod: PBBFAC,HCNC,, | Performed by: INTERNAL MEDICINE

## 2019-07-08 PROCEDURE — 99214 PR OFFICE/OUTPT VISIT, EST, LEVL IV, 30-39 MIN: ICD-10-PCS | Mod: HCNC,S$GLB,, | Performed by: INTERNAL MEDICINE

## 2019-07-08 PROCEDURE — 3074F SYST BP LT 130 MM HG: CPT | Mod: HCNC,CPTII,S$GLB, | Performed by: INTERNAL MEDICINE

## 2019-07-08 PROCEDURE — 3078F DIAST BP <80 MM HG: CPT | Mod: HCNC,CPTII,S$GLB, | Performed by: INTERNAL MEDICINE

## 2019-07-08 PROCEDURE — 99214 OFFICE O/P EST MOD 30 MIN: CPT | Mod: HCNC,S$GLB,, | Performed by: INTERNAL MEDICINE

## 2019-07-08 RX ORDER — AZATHIOPRINE 50 MG/1
50 TABLET ORAL DAILY
Qty: 30 TABLET | Refills: 2 | Status: SHIPPED | OUTPATIENT
Start: 2019-07-08 | End: 2019-09-19

## 2019-07-08 ASSESSMENT — ROUTINE ASSESSMENT OF PATIENT INDEX DATA (RAPID3)
PAIN SCORE: 4.5
PATIENT GLOBAL ASSESSMENT SCORE: 9
TOTAL RAPID3 SCORE: 6.39
PSYCHOLOGICAL DISTRESS SCORE: 5
MDHAQ FUNCTION SCORE: 1.7
FATIGUE SCORE: 5
AM STIFFNESS SCORE: 0, NO

## 2019-07-08 ASSESSMENT — SYSTEMIC LUPUS ERYTHEMATOSUS DISEASE ACTIVITY INDEX (SLEDAI): TOTAL_SCORE: 2

## 2019-07-08 NOTE — TELEPHONE ENCOUNTER
Informed Patient  that [facility] received prescription for Azathioprine and benefits investigation is required.  [facility] will be back in touch once insurance determination is received.

## 2019-07-08 NOTE — ASSESSMENT & PLAN NOTE
Has recurrent cutaneous disease which is very distressing to her.  Hydroxychloroquine previously stopped due to concern for eye toxicity    She does not have recurrent systemic disease and labs are ok    She has CKD and cardiac amyloid and aortic stenosis    She has eye appt for retina f/u to be sure that she cannot re-start hydroxychloroquine    She can continue topical steroids for now (but they are not working )   I will try azathioprine for cutaneous lupus; If eye doctor says HCQ is ok then will resume it    Plan lab in 3 mo if starts AZA

## 2019-07-08 NOTE — PROGRESS NOTES
"Subjective:       Patient ID: Ewelina Arnold is a 73 y.o. female.    Chief Complaint: Disease Management    Early 1990's developed acute respiratory failure and spent weeks in ICU [facility]    Positive CARYN, SSB    Hx Rash, leukopenia    Took  hydroxychloroquine >5 yr; stopped after eye exam April 2019 due to abnormal OCT  Since stopping HCQ she has developed new DLE lesions     Hx:  CAD and Aortic stenosis and hx CHF; s/p PCI; ASA , rosuvastatin, carvedilol, furosemide, isosorbide, metolazone ; has appt with Dr. Burdick (unhappy with [facility])  HTN nifedipine  Pulmonary nodule  Thyroid disease on levothyroxine  CKD; she donated a kidney to her brother  Anemia evaluated by Dr. Pham     Now c/o intensly itchy skin lesions that have not improved with topical steroids prescribed by Dr Pena    No other systemic c/o    Review of Systems   Constitutional: Negative for fatigue, fever and unexpected weight change.   HENT: Negative for mouth sores and trouble swallowing.         Dry mouth   Eyes: Negative for redness.        Dry eyes   Respiratory: Negative for cough and shortness of breath.    Cardiovascular: Negative for chest pain.   Gastrointestinal: Negative for abdominal pain, constipation and diarrhea.   Skin: Positive for rash.   Neurological: Negative for headaches.   Hematological: Negative for adenopathy. Does not bruise/bleed easily.   Psychiatric/Behavioral: Negative for sleep disturbance.         Objective:   BP (!) 103/56   Pulse (!) 51   Ht 5' 4" (1.626 m)   Wt 51.6 kg (113 lb 12.1 oz)   LMP  (LMP Unknown) Comment: 99  BMI 19.53 kg/m²      Physical Exam   Skin:     Raised lupus skin lesions on face, arms, and back           June 26 lab: anemia, ESR 34, Cr 4.5, alb 2.6, DNA neg, C3, C4 nl , UA neg protein  Assessment:       1. Other systemic lupus erythematosus with other organ involvement            Plan:       Problem List Items Addressed This Visit        Active Problems    Other forms of systemic lupus " erythematosus - Primary (Chronic)     Has recurrent cutaneous disease which is very distressing to her.  Hydroxychloroquine previously stopped due to concern for eye toxicity    She does not have recurrent systemic disease and labs are ok    She has CKD and cardiac amyloid and aortic stenosis    She has eye appt for retina f/u to be sure that she cannot re-start hydroxychloroquine    She can continue topical steroids for now (but they are not working )   I will try azathioprine for cutaneous lupus; If eye doctor says HCQ is ok then will resume it    Plan lab in 3 mo if starts AZA         Relevant Medications    azaTHIOprine (IMURAN) 50 mg Tab          CBC and CMP in one month  Full lupus panel in 3 mo  RTC after lupus lab in 3 mo

## 2019-07-14 ENCOUNTER — TELEPHONE (OUTPATIENT)
Dept: TRANSPLANT | Facility: HOSPITAL | Age: 74
End: 2019-07-14

## 2019-07-14 NOTE — TELEPHONE ENCOUNTER
----- Message from Janeth Putnam sent at 7/3/2019 12:30 PM CDT -----  Regarding: Vyndaqwl, DNA results  [facility] Ms. Arnold and has taken over the process of obtaining financial approval for Vyndaqwl, DNA test was negative and Ms. Arnold understands that she has wild type cardiac TTR amyloidosis. Please update on problem list as I am unable to do so.  Thanks,  Janeth

## 2019-07-16 ENCOUNTER — INFUSION (OUTPATIENT)
Dept: INFECTIOUS DISEASES | Facility: HOSPITAL | Age: 74
End: 2019-07-16
Attending: INTERNAL MEDICINE
Payer: MEDICARE

## 2019-07-16 VITALS
BODY MASS INDEX: 19.36 KG/M2 | WEIGHT: 116.19 LBS | HEIGHT: 65 IN | DIASTOLIC BLOOD PRESSURE: 60 MMHG | SYSTOLIC BLOOD PRESSURE: 135 MMHG

## 2019-07-16 DIAGNOSIS — D63.1 ANEMIA IN STAGE 5 CHRONIC KIDNEY DISEASE: Primary | ICD-10-CM

## 2019-07-16 DIAGNOSIS — D63.1 ANEMIA OF CHRONIC RENAL FAILURE, STAGE 4 (SEVERE): ICD-10-CM

## 2019-07-16 DIAGNOSIS — N18.4 ANEMIA OF CHRONIC RENAL FAILURE, STAGE 4 (SEVERE): ICD-10-CM

## 2019-07-16 DIAGNOSIS — N18.5 ANEMIA IN STAGE 5 CHRONIC KIDNEY DISEASE: Primary | ICD-10-CM

## 2019-07-16 PROCEDURE — 63600175 PHARM REV CODE 636 W HCPCS: Mod: HCNC,JG,EC | Performed by: INTERNAL MEDICINE

## 2019-07-16 PROCEDURE — 96372 THER/PROPH/DIAG INJ SC/IM: CPT | Mod: HCNC

## 2019-07-16 RX ADMIN — DARBEPOETIN ALFA 60 MCG: 60 INJECTION, SOLUTION INTRAVENOUS; SUBCUTANEOUS at 08:07

## 2019-07-16 NOTE — PROGRESS NOTES
Hgb 9.6;Hct 32.6    No dose change  PER PROTOCOL FORM DROD-188 due to being increased on 7/1/19.  Aranesp 60 mcg given and 3 week return appt set.  Patient is note on any oral Iron. Iron sats at 19%. Dr Epperson notified. Shot was given in upper right arm due to patient having a lupus outbreak and lump on upper left arm.     Gfr 10.5/stage 5

## 2019-07-17 ENCOUNTER — DOCUMENTATION ONLY (OUTPATIENT)
Dept: TRANSPLANT | Facility: CLINIC | Age: 74
End: 2019-07-17

## 2019-07-17 DIAGNOSIS — I43 SENILE CARDIAC AMYLOIDOSIS: Primary | ICD-10-CM

## 2019-07-17 DIAGNOSIS — E85.4 SENILE CARDIAC AMYLOIDOSIS: Primary | ICD-10-CM

## 2019-07-17 NOTE — TELEPHONE ENCOUNTER
FOR DOCUMENTATION ONLY:  Financial Assistance for Vyndaqel approved from 04/18/19 to 07/16/20  Source [employer]  BIN: [id number]  SUNITHA: GARY  Id: [id number]  GRP: [id number]

## 2019-07-17 NOTE — PROGRESS NOTES
Received information from [facility] that Vyndaqel has been approved. Order placed for 6 MWT to be scheduled within the next 6 weeks and f/u appt with Dr. Burdick in 3 months with BNP, ProBNP and Troponin as part of 3 month f/u after starting Vyndaqel.

## 2019-07-19 NOTE — PROGRESS NOTES
Adherence packed for 28 days using Dispill:     Morning card:  Aspirin 81 mg  Carvedilol 25 mg  Citalopram 20 mg (1&1/2 tabs)  Famotidine 20 mg  Levothyroxine 75 mcg  Nifedipine ER Osmotic 60 mg  Sodium Bicarb 650 mg  Vitamin D 62971 IU (Once weekly in the Saturday pocket)        Evening card:  Atorvastatin 80 mg  Carvedilol 25 mg  Nifedipine ER Osmotic 60 mg  Sodium Bicarb 650 mg        PRN:  Doxazosin 2 mg (given in a separate bottle)

## 2019-07-25 ENCOUNTER — HOSPITAL ENCOUNTER (OUTPATIENT)
Dept: PULMONOLOGY | Facility: CLINIC | Age: 74
Discharge: HOME | End: 2019-07-25
Payer: MEDICARE

## 2019-07-25 ENCOUNTER — OFFICE VISIT (OUTPATIENT)
Dept: NEPHROLOGY | Facility: CLINIC | Age: 74
End: 2019-07-25
Payer: MEDICARE

## 2019-07-25 ENCOUNTER — OFFICE VISIT (OUTPATIENT)
Dept: OPHTHALMOLOGY | Facility: CLINIC | Age: 74
End: 2019-07-25
Payer: MEDICARE

## 2019-07-25 ENCOUNTER — OFFICE VISIT (OUTPATIENT)
Dept: PRIMARY CARE CLINIC | Facility: CLINIC | Age: 74
End: 2019-07-25
Payer: MEDICARE

## 2019-07-25 VITALS
WEIGHT: 115.63 LBS | HEIGHT: 64 IN | OXYGEN SATURATION: 98 % | BODY MASS INDEX: 19.74 KG/M2 | DIASTOLIC BLOOD PRESSURE: 68 MMHG | HEART RATE: 64 BPM | SYSTOLIC BLOOD PRESSURE: 140 MMHG

## 2019-07-25 VITALS
BODY MASS INDEX: 19.72 KG/M2 | DIASTOLIC BLOOD PRESSURE: 60 MMHG | WEIGHT: 115.5 LBS | SYSTOLIC BLOOD PRESSURE: 150 MMHG | HEIGHT: 64 IN | OXYGEN SATURATION: 99 % | HEART RATE: 65 BPM

## 2019-07-25 VITALS — HEIGHT: 64 IN | BODY MASS INDEX: 19.63 KG/M2 | WEIGHT: 115 LBS

## 2019-07-25 DIAGNOSIS — N18.4 CHRONIC KIDNEY DISEASE, STAGE IV (SEVERE): Primary | ICD-10-CM

## 2019-07-25 DIAGNOSIS — R42 DIZZINESS: ICD-10-CM

## 2019-07-25 DIAGNOSIS — N18.4 CKD (CHRONIC KIDNEY DISEASE), STAGE IV: ICD-10-CM

## 2019-07-25 DIAGNOSIS — N18.4 ANEMIA OF CHRONIC RENAL FAILURE, STAGE 4 (SEVERE): ICD-10-CM

## 2019-07-25 DIAGNOSIS — E26.9 HYPERALDOSTERONISM: ICD-10-CM

## 2019-07-25 DIAGNOSIS — N18.4 ANEMIA IN STAGE 4 CHRONIC KIDNEY DISEASE: ICD-10-CM

## 2019-07-25 DIAGNOSIS — E85.4 CARDIAC AMYLOIDOSIS: ICD-10-CM

## 2019-07-25 DIAGNOSIS — M32.14 OTHER SYSTEMIC LUPUS ERYTHEMATOSUS WITH GLOMERULAR DISEASE: Chronic | ICD-10-CM

## 2019-07-25 DIAGNOSIS — D63.1 ANEMIA OF CHRONIC RENAL FAILURE, STAGE 4 (SEVERE): ICD-10-CM

## 2019-07-25 DIAGNOSIS — I43 CARDIAC AMYLOIDOSIS: ICD-10-CM

## 2019-07-25 DIAGNOSIS — H35.389 TOXIC MACULOPATHY FROM PLAQUENIL IN THERAPEUTIC USE: Primary | ICD-10-CM

## 2019-07-25 DIAGNOSIS — D63.1 ANEMIA IN STAGE 4 CHRONIC KIDNEY DISEASE: ICD-10-CM

## 2019-07-25 DIAGNOSIS — M32.9 SYSTEMIC LUPUS ERYTHEMATOSUS, UNSPECIFIED SLE TYPE, UNSPECIFIED ORGAN INVOLVEMENT STATUS: ICD-10-CM

## 2019-07-25 DIAGNOSIS — H25.13 NUCLEAR SCLEROSIS OF BOTH EYES: ICD-10-CM

## 2019-07-25 DIAGNOSIS — I43 SENILE CARDIAC AMYLOIDOSIS: ICD-10-CM

## 2019-07-25 DIAGNOSIS — Z79.899 LONG-TERM USE OF PLAQUENIL: ICD-10-CM

## 2019-07-25 DIAGNOSIS — E85.4 SENILE CARDIAC AMYLOIDOSIS: ICD-10-CM

## 2019-07-25 DIAGNOSIS — T37.2X5A TOXIC MACULOPATHY FROM PLAQUENIL IN THERAPEUTIC USE: Primary | ICD-10-CM

## 2019-07-25 PROCEDURE — 1101F PR PT FALLS ASSESS DOC 0-1 FALLS W/OUT INJ PAST YR: ICD-10-PCS | Mod: HCNC,CPTII,S$GLB, | Performed by: INTERNAL MEDICINE

## 2019-07-25 PROCEDURE — 3077F PR MOST RECENT SYSTOLIC BLOOD PRESSURE >= 140 MM HG: ICD-10-PCS | Mod: HCNC,CPTII,S$GLB, | Performed by: INTERNAL MEDICINE

## 2019-07-25 PROCEDURE — 99999 PR PBB SHADOW E&M-EST. PATIENT-LVL II: CPT | Mod: PBBFAC,HCNC,, | Performed by: OPHTHALMOLOGY

## 2019-07-25 PROCEDURE — 92012 INTRM OPH EXAM EST PATIENT: CPT | Mod: HCNC,S$GLB,, | Performed by: OPHTHALMOLOGY

## 2019-07-25 PROCEDURE — 3078F PR MOST RECENT DIASTOLIC BLOOD PRESSURE < 80 MM HG: ICD-10-PCS | Mod: HCNC,CPTII,S$GLB, | Performed by: INTERNAL MEDICINE

## 2019-07-25 PROCEDURE — 3078F DIAST BP <80 MM HG: CPT | Mod: HCNC,CPTII,S$GLB, | Performed by: INTERNAL MEDICINE

## 2019-07-25 PROCEDURE — 92134 CPTRZ OPH DX IMG PST SGM RTA: CPT | Mod: HCNC,S$GLB,, | Performed by: OPHTHALMOLOGY

## 2019-07-25 PROCEDURE — 92012 PR EYE EXAM, EST PATIENT,INTERMED: ICD-10-PCS | Mod: HCNC,S$GLB,, | Performed by: OPHTHALMOLOGY

## 2019-07-25 PROCEDURE — 92134 POSTERIOR SEGMENT OCT RETINA (OCULAR COHERENCE TOMOGRAPHY)-BOTH EYES: ICD-10-PCS | Mod: HCNC,S$GLB,, | Performed by: OPHTHALMOLOGY

## 2019-07-25 PROCEDURE — 99214 PR OFFICE/OUTPT VISIT, EST, LEVL IV, 30-39 MIN: ICD-10-PCS | Mod: HCNC,S$GLB,, | Performed by: INTERNAL MEDICINE

## 2019-07-25 PROCEDURE — 1101F PT FALLS ASSESS-DOCD LE1/YR: CPT | Mod: HCNC,CPTII,S$GLB, | Performed by: INTERNAL MEDICINE

## 2019-07-25 PROCEDURE — 99999 PR PBB SHADOW E&M-EST. PATIENT-LVL III: CPT | Mod: PBBFAC,HCNC,, | Performed by: INTERNAL MEDICINE

## 2019-07-25 PROCEDURE — 99214 OFFICE O/P EST MOD 30 MIN: CPT | Mod: HCNC,S$GLB,, | Performed by: INTERNAL MEDICINE

## 2019-07-25 PROCEDURE — 99215 OFFICE O/P EST HI 40 MIN: CPT | Mod: HCNC,S$GLB,, | Performed by: INTERNAL MEDICINE

## 2019-07-25 PROCEDURE — 99215 PR OFFICE/OUTPT VISIT, EST, LEVL V, 40-54 MIN: ICD-10-PCS | Mod: HCNC,S$GLB,, | Performed by: INTERNAL MEDICINE

## 2019-07-25 PROCEDURE — 3077F SYST BP >= 140 MM HG: CPT | Mod: HCNC,CPTII,S$GLB, | Performed by: INTERNAL MEDICINE

## 2019-07-25 PROCEDURE — 99999 PR PBB SHADOW E&M-EST. PATIENT-LVL II: ICD-10-PCS | Mod: PBBFAC,HCNC,, | Performed by: OPHTHALMOLOGY

## 2019-07-25 PROCEDURE — 99999 PR PBB SHADOW E&M-EST. PATIENT-LVL III: ICD-10-PCS | Mod: PBBFAC,HCNC,, | Performed by: INTERNAL MEDICINE

## 2019-07-25 RX ORDER — MECLIZINE HYDROCHLORIDE 25 MG/1
25 TABLET ORAL 3 TIMES DAILY PRN
Qty: 90 TABLET | Refills: 0 | Status: SHIPPED | OUTPATIENT
Start: 2019-07-25

## 2019-07-25 NOTE — ASSESSMENT & PLAN NOTE
"Per cardiology: "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"  · Continue vyndaqel  · Frequent labs  · F/U with Dr Burdick  "

## 2019-07-25 NOTE — ASSESSMENT & PLAN NOTE
Diagnosed in 1990s, pos CARYN, SSB. Biopsy showed cutaneous involvment  · Rheum stopped plaquenil due to occular toxicity  · Completed prednisone for erythema nodosum  · May need another course due to new rash  · Started on azothioprine, needs frequent labs and folllow-up  · F/U Rheum

## 2019-07-25 NOTE — PATIENT INSTRUCTIONS
TODAY:  - continue Vyndaquel for amyloidosis, this may help your heart valve too  -  pill packs today  - move labs scheduled on 8/8 to 8/5 lab draw (get labs drawn for Ray Epperson and James on the same day)  - read patient education on amyloidosis, and we can talk about it at the next appointment  - meclizine sent to [facility]

## 2019-07-25 NOTE — ASSESSMENT & PLAN NOTE
Kidney donation history, Cr 3.5  · F/U  [place]  · Monitor  · Avoid nephrotoxic meds  · Treat amyloidosis and lupus

## 2019-07-25 NOTE — PROGRESS NOTES
Subjective:       Patient ID: Ewelina Arnold is a 73 y.o. female      Chief Complaint   Patient presents with    Concerns About Ocular Health     History of Present Illness  HPI     Here today  For follow up visit for Plaquenil toxicity .     Pt states that she has trouble now seeing the writing on the t.v.   Otherwise vision is good.  Overall happy with current Va.  No flashes/pain       Eye Med: Latanoprost QHS OU      POHX:   1. 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 High risk medication use - Both Eyes   3. Nuclear sclerosis of both eyes   4. Positive dysphotopsia   5. Insufficiency of tear film of both eyes   6. Arcuate scotoma of both eyes           Last edited by Bryn Sierra MD on 7/25/2019  9:56 AM. (History)        Imaging:    HPI     Here today  For follow up visit for Plaquenil toxicity .     Pt states that she has trouble now seeing the writing on the t.v.   Otherwise vision is good.  Overall happy with current Va.  No flashes/pain       Eye Med: Latanoprost QHS OU      POHX:   1. 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 High risk medication use - Both Eyes   3. Nuclear sclerosis of both eyes   4. Positive dysphotopsia   5. Insufficiency of tear film of both eyes   6. Arcuate scotoma of both eyes           Last edited by Bryn Sierra MD on 7/25/2019  9:56 AM. (History)          [place] SDOCT:   OD: good quality, good contour, IS/OS intact centrally but thin eccentrically, appears wose since 5/11/18 scan  OS: good quality, good contour, IS/OS intact centrally but thin eccentrically, appears wose since 5/11/18 scan    Fundus Autofluorescence  OD: No bull's eye but appears inc in more peripheral hypoautofluorescence  OS: No bull's eye but appears inc in more peripheral hypoautofluorescence    Assessment /Plan     For exam results, see Encounter Report.    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  Recommend to continue to observe cataracts    Positive dysphotopsia.  Evaluated by Dr Collazo in past.  Recommend PMD eval as recommended by Dr Collazo    VF changes diff to interpret with gl and per JDN note pt not reliable VF tasha    High risk dose Plaquenil.  Good Va but OCT changes c/w maculopathy today.  Changes not detectable on Autofluorescence.    Called to d/w pt.  Recommend to stop Plaquenil and transition to another med.  Will d/w Dr Ramirez.    RTC with me 3 months, sooner PRN  Keep f/u with JDN as planned                   [facility] SDOCT:   OD: good quality, good contour, IS/OS intact centrally but thin eccentrically, appears overall stable  OS: good quality, good contour, IS/OS intact centrally but thin eccentrically, appears overall stable    Fundus Autofluorescence  OD: No bull's eye but appears inc in more peripheral hypoautofluorescence, stable  OS: No bull's eye but appears inc in more peripheral hypoautofluorescence, stable    Assessment/Plan:     1. 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 Systemic lupus erythematosus, unspecified SLE type, unspecified organ involvement status    - Posterior Segment OCT Retina-Both eyes    3. Nuclear sclerosis of both eyes  Good Va.  Observe    4. Long-term use of Plaquenil  Now off.      - Posterior Segment OCT Retina-Both eyes    Follow up in about 3 months (around  10/25/2019), or if symptoms worsen or fail to improve, for Comprehensive Examination, Fundus Autofluoresence, OCT Mac, HVF 10-2.

## 2019-07-25 NOTE — PROGRESS NOTES
Primary Care Provider Appointment    Subjective:      Patient ID: Ewelina Arnold is a 73 y.o. female with HTN, MDD, CKD, lupus, cardiac amyloidosis    Chief Complaint: Follow-up and Hypertension    Patient has frequent lupus flairs, had pruritic rash recently. Did not improve with steroid cream. Was started on azothioprine by Dr Ramirez.    Seen by optho for plaquenil toxicity. Med was discontinued due to damage to the retina. Now on azothioprine, with improvement in the rash. Needs labs in 3 mos.    Has recent diagnosis of cardiac amyloidosis, was started on Vyndaqel (gets it from [employer]). Is monitored q3 mos with Dr Burdick.     Pills packed as follows:  Adherence packed for 28 days using Dispill:     Morning card:  Aspirin 81 mg  Carvedilol 25 mg  Citalopram 20 mg (1&1/2 tabs)  Famotidine 20 mg  Levothyroxine 75 mcg  Nifedipine ER Osmotic 60 mg  Sodium Bicarb 650 mg  Vitamin D 85462 IU (Once weekly in the Saturday pocket)        Evening card:  Atorvastatin 80 mg  Carvedilol 25 mg  Nifedipine ER Osmotic 60 mg  Sodium Bicarb 650 mg        PRN:  Doxazosin 2 mg (given in a separate bottle)      Electronically signed by Soo Shah at 7/19/2019  2:57 PM       Past Surgical History:   Procedure Laterality Date    BIOPSY-BONE MARROW Left 10/16/2017    [facility]formed [facility] OR 2ND FLR    CARDIAC CATHETERIZATION  07/27/2011    x1    CHOLECYSTECTOMY  1999    COLON SURGERY  2000 & 2003    partial removal for CA    COLONOSCOPY N/A 9/14/2017    Performed [facility] ENDO (4TH FLR)    COLONOSCOPY N/A 4/14/2016    [facility] ENDO (4TH FLR)    COLONOSCOPY N/A 4/23/2013    [facility] ENDO (4TH FLR)    TALIA-TRANSFORAMINAL Left 11/3/2016    [facility]    ESOPHAGOGASTRODUODENOSCOPY (EGD) N/A 3/16/2017    [facility] ENDO    EYE SURGERY       HAND SURGERY Bilateral 1996 & 1997    due to carpal tunnel     HERNIA REPAIR      HYSTERECTOMY  1983    INJECTION-STEROID-EPIDURAL-TRANSFORAMINAL Left 1/7/2016    [facility] PAIN [facility]GT    Left heart cath Left 12/20/2017    [facility] LAB    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    OOPHORECTOMY      removed one    Peripheral Iridotomy both eyes  1994    laser angle correction    THYROIDECTOMY, PARTIAL  2006    to remove two nodules and one-half thyroid       Past Medical History:   Diagnosis Date    Acute hypoxemic respiratory failure 4/28/2018    Acute on chronic diastolic congestive heart failure 11/17/2017    Allergy     Anatomical narrow angle glaucoma     Anemia     States diagnosed about a month ago    Aortic atherosclerosis     Arthritis     Cataract     CHF (congestive heart failure)     Chronic kidney disease     Chronic sciatica of left side     Right lower back pain due to sciatica    Coronary artery disease     Depression     Dry eyes     GERD (gastroesophageal reflux disease)     History of colon cancer     Hyperaldosteronism     Hyperlipidemia     Hypertension     Hypothyroidism     Keloid cicatrix     Left ventricular diastolic dysfunction with preserved systolic function     Lupus (systemic lupus erythematosus) 8/17/2012    Lupus (systemic lupus erythematosus)     Malnutrition 5/16/2017    Osteoarthritis of cervical spine 8/17/2012    Osteopenia     PAD (peripheral artery disease)     FRAN (renal artery stenosis)        Social History     Socioeconomic History    Marital status: Single     Spouse name: Not on file    Number of children: Not on file    Years of education: Not on file    Highest education level: Not on file   Occupational History     Employer: Retired    Social Needs    Financial resource strain: Not very hard    Food insecurity:     Worry: Never true     Inability: Never true  "   Transportation needs:     Medical: No     Non-medical: No   Tobacco Use    Smoking status: Former Smoker     Packs/day: 1.50     Years: 30.00     Pack years: 45.00     Types: Cigarettes     Start date:      Last attempt to quit: 3/13/1985     Years since quittin.3    Smokeless tobacco: Never Used   Substance and Sexual Activity    Alcohol use: No    Drug use: No    Sexual activity: Not Currently     Partners: Male     Birth control/protection: Abstinence   Lifestyle    Physical activity:     Days per week: Not on file     Minutes per session: Not on file    Stress: Not on file   Relationships    Social connections:     Talks on phone: Not on file     Gets together: Not on file     Attends Buddhism service: Not on file     Active member of club or organization: Not on file     Attends meetings of clubs or organizations: Not on file     Relationship status: Not on file   Other Topics Concern    Are you pregnant or think you may be? No    Breast-feeding No   Social History Narrative    Not on file       Review of Systems   Constitutional: Positive for activity change and fatigue. Negative for appetite change.   Respiratory: Positive for shortness of breath. Negative for cough.    Cardiovascular: Negative for leg swelling.   Endocrine: Positive for cold intolerance.   Genitourinary: Negative for difficulty urinating and dysuria.   Skin: Positive for wound.   Neurological: Negative for dizziness and light-headedness.   Hematological: Bruises/bleeds easily.   Psychiatric/Behavioral: Positive for agitation. Negative for behavioral problems, confusion, decreased concentration and dysphoric mood.       Objective:   BP (!) 140/68   Pulse 64   Ht 5' 4" (1.626 m)   Wt 52.4 kg (115 lb 10.1 oz)   LMP  (LMP Unknown) Comment: 99  SpO2 98%   BMI 19.85 kg/m²     Physical Exam   Constitutional: She is oriented to person, place, and time. She appears well-developed and well-nourished.   HENT:   Head: " "Normocephalic and atraumatic.   Neck: Normal range of motion.   Pulmonary/Chest: Effort normal.   Musculoskeletal: She exhibits deformity. She exhibits no edema.   Resolution of LE nodules   Neurological: She is alert and oriented to person, place, and time.   Psychiatric: She has a normal mood and affect. Her behavior is normal. Thought content normal.   Vitals reviewed.      Lab Results   Component Value Date    WBC 5.42 06/26/2019    HGB 9.6 (L) 07/11/2019    HCT 32.1 (L) 07/11/2019     (H) 06/26/2019    CHOL 181 08/28/2018    TRIG 54 08/28/2018    HDL 66 08/28/2018    ALT 7 (L) 06/26/2019    AST 26 06/26/2019     (L) 06/26/2019    K 4.2 06/26/2019    CL 95 06/26/2019    CREATININE 4.5 (H) 06/26/2019    BUN 90 (H) 06/26/2019    CO2 25 06/26/2019    TSH 1.798 01/25/2019    INR 1.0 09/05/2018    GLUF 79 12/02/2011    HGBA1C 5.2 09/25/2018       RESULTS: Reviewed labs and images today    Assessment:   73 y.o. female with multiple co-morbid illnesses here to continue work-up of chronic issues notably with HTN, MDD, CKD, lupus, cardiac amyloidosis.     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 with mildly " "positive PYP scan: prescribed Vyndaqel for TTR amyloidosis."  · Continue vyndaqel  · Frequent labs  · F/U with Dr Burdick         Relevant Orders    Ambulatory Referral to Outpatient Case Management       Oncology    Anemia of chronic renal failure, stage 4 (severe)     Followed by Nephrology, PRN iron infusions, arenesp injections  · F/U Nephrology  · Iron infusion PRN  · Continue arenesp         Relevant Orders    Ambulatory Referral to Outpatient Case Management       Other    Dizziness     Dizziness, numerous chronic issues  · Continue meclizine         Relevant Medications    meclizine (ANTIVERT) 25 mg tablet    Other Relevant Orders    Ambulatory Referral to Outpatient Case Management          Health Maintenance       Date Due Completion Date    Shingles Vaccine (2 of 3) 01/09/2012 11/14/2011    Mammogram 09/11/2019 9/11/2018    Influenza Vaccine 08/01/2019 9/12/2018    Override on 10/9/2015: Done    High Dose Statin 07/25/2020 7/25/2019    DEXA SCAN 01/08/2022 1/8/2019    Colonoscopy 09/14/2022 9/14/2017    Lipid Panel 08/28/2023 8/28/2018    TETANUS VACCINE 06/29/2026 6/29/2016          Follow up in about 2 months (around 9/25/2019). . Total face-to-face time was 60 min, 50% of this was spent on counseling and coordination of care. The following issues were discussed: with HTN, MDD, CKD, lupus, cardiac amyloidosis    Amarilys Johns MD/MPH  Internal Medicine  [facility]  [phone]  "

## 2019-07-25 NOTE — PROCEDURES
Ewelina Arnold is a 73 y.o.  female patient, who presents for a 6 minute walk test ordered by Yeimi Burdick MD.  The diagnosis is Congestive Heart Failure.  The patient's BMI is 19.8 kg/m2.  Predicted distance (lower limit of normal) is 328.86 meters.      Test Results:    The test was not completed.  The patient stopped 1 time for a total of 105 seconds.  The total time walked was 255 seconds.  During walking, the patient reported:  Dyspnea, Leg pain, Lightheadedness.  The patient used a wheelchair for assistance during testing.     07/25/2019---------Distance: 121.92 meters (400 feet)     O2 Sat % Supplemental Oxygen Heart Rate Blood Pressure Dian Scale   Pre-exercise  (Resting) 96 % Room Air 67 bpm 151/69 mmHg 0   During Exercise 98 % Room Air 74 bpm 162/70 mmHg 7-8   Post-exercise  (Recovery) 98 % Room Air  70 bpm       Performing nurse/tech:  Freddy CANELA      PREVIOUS STUDY:   05/01/2019---------Distance: 121.92 meters (400 feet)       O2 Sat % Supplemental Oxygen Heart Rate Blood Pressure Dian Scale   Pre-exercise  (Resting) 97 % Room Air 62 bpm 131/63 mmHg 2   During Exercise 98 % Room Air 62 bpm 130/60 mmHg 3   Post-exercise  (Recovery) 97 % Room Air  62 bpm           CLINICAL INTERPRETATION:  Six minute walk distance is 121.92 meters (400 feet) with very heavy dyspnea.  During exercise, there was no desaturation while breathing room air.  Both blood pressure and heart rate remained stable with walking.  Hypertension was present prior to exercise.  The patient reported non-pulmonary symptoms during exercise.  Severe exercise impairment is likely due to cardiovascular causes and subjective symptoms.  The patient did not complete the study, walking 255 seconds of the 360 second test.  Since the previous study in May 2019, exercise capacity is unchanged.  Based upon age and body mass index, exercise capacity is less than predicted.

## 2019-07-26 ENCOUNTER — TELEPHONE (OUTPATIENT)
Dept: INTERNAL MEDICINE | Facility: CLINIC | Age: 74
End: 2019-07-26

## 2019-07-26 NOTE — TELEPHONE ENCOUNTER
Adelina- a referral was placed for [facility] consult yesterday, can you follow-up?    Meaghan- Please let patient know that Adelina will be reaching out soon. Also, the palliative program is no longer following her, so I'm in communication with that group on next steps.    Thanks  KJ    ----- Message from Meaghan Burgess MA sent at 7/26/2019  1:37 PM CDT -----  Contact: self/[phone]  Spoke with pt, pt states she was just following up on a  she stated you placed for her she would like to know what's happening   ----- Message -----  From: Doris Morales  Sent: 7/26/2019   1:31 PM  To: Nat Canseco Staff    Pt is calling to speak with someone in the office in regards to something's she has questions about. Please call and advise.        Thank You

## 2019-07-26 NOTE — TELEPHONE ENCOUNTER
Contacted Ms. Arnold in regards to initial Vyndaqel consult and shipment. Informed her the medication is approved through insurance for a $0.00 copay after securing a abimael to cover the cost of her copay. Will ship the medication on 19 for the patient to receive and initiate on 19.     Patient has been on therapy since early July.  She received her previous shipment from [facility], however, we were able to secure financial assistance, therefore [facility] will take care of her refills moving forward.      She feels that she is doing well on the medication so far.  Confirmed she is taking 4 tablets daily and currently has about 10 days of medication on hand.  She declined full consult and did not have any additional questions at this time.  We will follow up with her 1 week after her first fill and monthly for refills.  She currently receives Azathioprine from [facility] and is aware of our refill and follow up procedures as well as our on-call services.      Medication will ship 19 for delivery on 19.  Patient will start on 19 with new shipment.  Address confirmed.  Two patient identifiers confirmed - name and .  Therapy appropriate.

## 2019-08-02 ENCOUNTER — LAB VISIT (OUTPATIENT)
Dept: LAB | Facility: HOSPITAL | Age: 74
End: 2019-08-02
Attending: INTERNAL MEDICINE
Payer: MEDICARE

## 2019-08-02 DIAGNOSIS — M32.19 OTHER SYSTEMIC LUPUS ERYTHEMATOSUS WITH OTHER ORGAN INVOLVEMENT: Chronic | ICD-10-CM

## 2019-08-02 DIAGNOSIS — N18.5 ANEMIA IN STAGE 5 CHRONIC KIDNEY DISEASE: ICD-10-CM

## 2019-08-02 DIAGNOSIS — N18.4 ANEMIA OF CHRONIC RENAL FAILURE, STAGE 4 (SEVERE): ICD-10-CM

## 2019-08-02 DIAGNOSIS — D63.1 ANEMIA IN STAGE 5 CHRONIC KIDNEY DISEASE: ICD-10-CM

## 2019-08-02 DIAGNOSIS — D63.1 ANEMIA OF CHRONIC RENAL FAILURE, STAGE 4 (SEVERE): ICD-10-CM

## 2019-08-02 LAB
ALBUMIN SERPL BCP-MCNC: 3.1 G/DL (ref 3.5–5.2)
ALP SERPL-CCNC: 73 U/L (ref 55–135)
ALT SERPL W/O P-5'-P-CCNC: 9 U/L (ref 10–44)
ANION GAP SERPL CALC-SCNC: 11 MMOL/L (ref 8–16)
AST SERPL-CCNC: 25 U/L (ref 10–40)
BASOPHILS # BLD AUTO: 0.04 K/UL (ref 0–0.2)
BASOPHILS NFR BLD: 1 % (ref 0–1.9)
BILIRUB SERPL-MCNC: 0.3 MG/DL (ref 0.1–1)
BUN SERPL-MCNC: 79 MG/DL (ref 8–23)
C3 SERPL-MCNC: 114 MG/DL (ref 50–180)
C4 SERPL-MCNC: 25 MG/DL (ref 11–44)
CALCIUM SERPL-MCNC: 9.2 MG/DL (ref 8.7–10.5)
CHLORIDE SERPL-SCNC: 100 MMOL/L (ref 95–110)
CO2 SERPL-SCNC: 26 MMOL/L (ref 23–29)
CREAT SERPL-MCNC: 3.9 MG/DL (ref 0.5–1.4)
CRP SERPL-MCNC: 0.3 MG/L (ref 0–8.2)
DIFFERENTIAL METHOD: ABNORMAL
EOSINOPHIL # BLD AUTO: 0.4 K/UL (ref 0–0.5)
EOSINOPHIL NFR BLD: 8.6 % (ref 0–8)
ERYTHROCYTE [DISTWIDTH] IN BLOOD BY AUTOMATED COUNT: 15.8 % (ref 11.5–14.5)
ERYTHROCYTE [SEDIMENTATION RATE] IN BLOOD BY WESTERGREN METHOD: 70 MM/HR (ref 0–36)
EST. GFR  (AFRICAN AMERICAN): 12.5 ML/MIN/1.73 M^2
EST. GFR  (NON AFRICAN AMERICAN): 10.8 ML/MIN/1.73 M^2
GLUCOSE SERPL-MCNC: 70 MG/DL (ref 70–110)
HCT VFR BLD AUTO: 31.9 % (ref 37–48.5)
HGB BLD-MCNC: 9.8 G/DL (ref 12–16)
HGB BLD-MCNC: 9.8 G/DL (ref 12–16)
IMM GRANULOCYTES # BLD AUTO: 0 K/UL (ref 0–0.04)
IMM GRANULOCYTES NFR BLD AUTO: 0 % (ref 0–0.5)
IRON SERPL-MCNC: 49 UG/DL (ref 30–160)
LYMPHOCYTES # BLD AUTO: 0.8 K/UL (ref 1–4.8)
LYMPHOCYTES NFR BLD: 20.6 % (ref 18–48)
MCH RBC QN AUTO: 26.3 PG (ref 27–31)
MCHC RBC AUTO-ENTMCNC: 30.7 G/DL (ref 32–36)
MCV RBC AUTO: 86 FL (ref 82–98)
MONOCYTES # BLD AUTO: 0.7 K/UL (ref 0.3–1)
MONOCYTES NFR BLD: 15.9 % (ref 4–15)
NEUTROPHILS # BLD AUTO: 2.2 K/UL (ref 1.8–7.7)
NEUTROPHILS NFR BLD: 53.9 % (ref 38–73)
NRBC BLD-RTO: 0 /100 WBC
PLATELET # BLD AUTO: 209 K/UL (ref 150–350)
PMV BLD AUTO: 10 FL (ref 9.2–12.9)
POTASSIUM SERPL-SCNC: 4.1 MMOL/L (ref 3.5–5.1)
PROT SERPL-MCNC: 7.7 G/DL (ref 6–8.4)
RBC # BLD AUTO: 3.72 M/UL (ref 4–5.4)
SATURATED IRON: 18 % (ref 20–50)
SODIUM SERPL-SCNC: 137 MMOL/L (ref 136–145)
TOTAL IRON BINDING CAPACITY: 271 UG/DL (ref 250–450)
TRANSFERRIN SERPL-MCNC: 183 MG/DL (ref 200–375)
WBC # BLD AUTO: 4.08 K/UL (ref 3.9–12.7)

## 2019-08-02 PROCEDURE — 36415 COLL VENOUS BLD VENIPUNCTURE: CPT | Mod: HCNC,PO

## 2019-08-02 PROCEDURE — 86160 COMPLEMENT ANTIGEN: CPT | Mod: 59,HCNC

## 2019-08-02 PROCEDURE — 80053 COMPREHEN METABOLIC PANEL: CPT | Mod: HCNC

## 2019-08-02 PROCEDURE — 86140 C-REACTIVE PROTEIN: CPT | Mod: HCNC

## 2019-08-02 PROCEDURE — 85652 RBC SED RATE AUTOMATED: CPT | Mod: HCNC

## 2019-08-02 PROCEDURE — 86225 DNA ANTIBODY NATIVE: CPT | Mod: HCNC

## 2019-08-02 PROCEDURE — 86160 COMPLEMENT ANTIGEN: CPT | Mod: HCNC

## 2019-08-02 PROCEDURE — 85025 COMPLETE CBC W/AUTO DIFF WBC: CPT | Mod: HCNC

## 2019-08-02 PROCEDURE — 83540 ASSAY OF IRON: CPT | Mod: HCNC

## 2019-08-05 LAB — DSDNA AB SER-ACNC: NORMAL [IU]/ML

## 2019-08-06 ENCOUNTER — INFUSION (OUTPATIENT)
Dept: INFECTIOUS DISEASES | Facility: HOSPITAL | Age: 74
End: 2019-08-06
Attending: INTERNAL MEDICINE
Payer: MEDICARE

## 2019-08-06 VITALS
SYSTOLIC BLOOD PRESSURE: 136 MMHG | WEIGHT: 115.06 LBS | DIASTOLIC BLOOD PRESSURE: 62 MMHG | HEIGHT: 64 IN | BODY MASS INDEX: 19.64 KG/M2

## 2019-08-06 DIAGNOSIS — D63.1 ANEMIA OF CHRONIC RENAL FAILURE, STAGE 4 (SEVERE): ICD-10-CM

## 2019-08-06 DIAGNOSIS — D63.1 ANEMIA IN STAGE 5 CHRONIC KIDNEY DISEASE: Primary | ICD-10-CM

## 2019-08-06 DIAGNOSIS — N18.5 ANEMIA IN STAGE 5 CHRONIC KIDNEY DISEASE: Primary | ICD-10-CM

## 2019-08-06 DIAGNOSIS — N18.4 ANEMIA OF CHRONIC RENAL FAILURE, STAGE 4 (SEVERE): ICD-10-CM

## 2019-08-06 PROCEDURE — 63600175 PHARM REV CODE 636 W HCPCS: Mod: HCNC,JG | Performed by: INTERNAL MEDICINE

## 2019-08-06 PROCEDURE — 96372 THER/PROPH/DIAG INJ SC/IM: CPT | Mod: HCNC

## 2019-08-06 RX ADMIN — DARBEPOETIN ALFA 60 MCG: 60 INJECTION, SOLUTION INTRAVENOUS; SUBCUTANEOUS at 08:08

## 2019-08-06 NOTE — PROGRESS NOTES
Hgb 9.8;Hct 31.9    No dose change  PER PROTOCOL FORM DROD-188 due to being increased on 7/1/19.  Aranesp 60 mcg given and 3 week return appt set.  Patient is note on any oral Iron. Iron sats at 18%. Dr Epperson notified.     Gfr 12.5/stage 5

## 2019-08-06 NOTE — PROGRESS NOTES
Subjective:       Patient ID: Ewelina Arnold is a 73 y.o. Black or  female who presents for re-evaluation of progression of Chronic Kidney Disease    HPI    Ms. 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  Patient reports daily weights have been relatively stable.  She reports shortness of breath with exertion, stable.  She reports blood pressure improved and relatively stable since last visit, reviewed BP diary.  She otherwise denies any fever, chest pain, abdominal pain, dysuria/hematuria.     Review of Systems   Constitutional: Negative for appetite change, fatigue and fever.   Respiratory: Negative for chest tightness and shortness of breath.    Cardiovascular: Negative for chest pain and leg swelling.   Gastrointestinal: Negative for abdominal pain, constipation, diarrhea, nausea and vomiting.   Genitourinary: Negative for difficulty urinating, dysuria, flank pain, frequency, hematuria and urgency.   Musculoskeletal: Negative for arthralgias, joint swelling and myalgias.   Skin: Negative for rash and wound.   Neurological: Negative for dizziness, weakness and light-headedness.   All other systems reviewed and are negative.      Objective:      Physical Exam   Constitutional: She appears well-developed and well-nourished.   Cardiovascular: Normal rate, regular rhythm and normal heart sounds. Exam reveals no gallop and no friction rub.   No murmur heard.  Pulmonary/Chest: Effort normal and breath sounds normal. No respiratory distress. She has no wheezes. She has no rales.   Abdominal: Soft. Bowel sounds are normal. There is no tenderness.   Musculoskeletal: She exhibits no edema.   Neurological: She is alert.    Skin: Skin is warm and dry. No rash noted. No erythema.   Psychiatric: She has a normal mood and affect.       Assessment:       1. Chronic kidney disease, stage IV (severe)    2. Solitary kidney    3. Anemia in stage 4 chronic kidney disease    4. Hyperaldosteronism        Plan:     Ms. 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  Patient previously with acute kidney injury, likely due to cardiomyopathy and moderate volume depletion with diuresis, creatinine improved prior to and since discharge.  Will trend symptoms/creatinine closely, will phone review BP diary and daily weights.  Encouraged oral/fluid intake for now, suspect dry weight ~110-115lbs.  Further recommendations pending results of above, patient voiced understanding of above, agreeable to plan as noted.    - Aortic Stenosis/amyloid cardiomyopathy: patient seen by Interventional Cardiology, they are reluctant to perform TAVR given patient's kidney function and risk for worsening function requiring dialysis (though would suspect kidney function may improve with intervention).  Discussed with Dr. Burdick, will monitor response to treatment for cardiac amyloidosis (Vyndaqel, tafamidis) prior to considering initiation of HD (discussed at length with patient).   - Anemia: Hgb previously at goal, given IV iron, continue darbepoetin in Anemia clinic  - Bone/mineral metabolism: patient with secondary hyperparathyroidism, PTH at goal for stage CKD, patient on ergocalciferol for VitD deficiency    Return to clinic in 8-12 weeks pending renal panel within a month, then with renal/heme panel, iron/TIBC/ferritin, urinalysis/culture, urine protein/creatinine ratio prior to next  visit

## 2019-08-07 ENCOUNTER — TELEPHONE (OUTPATIENT)
Dept: RHEUMATOLOGY | Facility: CLINIC | Age: 74
End: 2019-08-07

## 2019-08-07 NOTE — TELEPHONE ENCOUNTER
Tell her that she is to take azathioprine 50 mg , one daily  I wrote Rx for 30 tablets and 2 refills  Then she needs lab and f/u

## 2019-08-07 NOTE — TELEPHONE ENCOUNTER
----- Message from Lizz Nixon MA sent at 8/7/2019 10:38 AM CDT -----  Contact: Self/ [phone]  Pt is calling to find out if she needs to keep taking the Imuran and is she suppose to take daily then please increase the quanity    Thank you    ----- Message -----  From: Carlos Messer  Sent: 8/7/2019  10:22 AM  To: James VOGT. Staff    Patient would like a call back to ask questions about her medications.

## 2019-08-07 NOTE — TELEPHONE ENCOUNTER
Patient called and given the correct dosage of azathioprine she is to take daily- 50 mg.  Patient verbalizes understanding.

## 2019-08-09 DIAGNOSIS — E85.4 CARDIAC AMYLOIDOSIS: ICD-10-CM

## 2019-08-09 DIAGNOSIS — I43 CARDIAC AMYLOIDOSIS: ICD-10-CM

## 2019-08-12 ENCOUNTER — TELEPHONE (OUTPATIENT)
Dept: DERMATOLOGY | Facility: CLINIC | Age: 74
End: 2019-08-12

## 2019-08-12 ENCOUNTER — TELEPHONE (OUTPATIENT)
Dept: PHARMACY | Facility: CLINIC | Age: 74
End: 2019-08-12

## 2019-08-12 NOTE — TELEPHONE ENCOUNTER
Returned pt call, explained don't have anything for today but offered her tomorrow. States she have check with her daughter for the time.     ----- Message from Amarilys Foreman sent at 8/12/2019  8:21 AM CDT -----  Contact: pt at [phone]  Needs Advice    Reason for call:  Ref to getting an appt for today for her back that has been inching and is creeping up to her neck.  Please call and advise.  Back is red and itchy.  Has ointment and creams that is not helping.  Maybe can try something else instead of coming in.  Pt uses the [facility] on [address] [place].         Communication Preference:call    Additional Information:

## 2019-08-12 NOTE — TELEPHONE ENCOUNTER
Returned pt call again. Scheduled her for tomorrow for a rash.    ----- Message from Daksha Daniels sent at 8/12/2019  8:48 AM CDT -----  Contact: Patient   Patient Returning Call from [facility]    Who Left Message for Patient: Tammy    Communication Preference: [phone]

## 2019-08-12 NOTE — PROGRESS NOTES
Adherence packed for 28 days using Dispill:     Morning card:  Aspirin 81 mg  Carvedilol 25 mg  Citalopram 20 mg (1&1/2 tabs)  Famotidine 20 mg  Levothyroxine 75 mcg  Nifedipine ER Osmotic 60 mg  Sodium Bicarb 650 mg  Vitamin D 96513 IU (Once weekly in the Saturday pocket)        Evening card:  Atorvastatin 80 mg  Carvedilol 25 mg  Nifedipine ER Osmotic 60 mg  Sodium Bicarb 650 mg        PRN:  Doxazosin 2 mg (given in a separate bottle)

## 2019-08-13 ENCOUNTER — OFFICE VISIT (OUTPATIENT)
Dept: DERMATOLOGY | Facility: CLINIC | Age: 74
End: 2019-08-13
Payer: MEDICARE

## 2019-08-13 DIAGNOSIS — L81.0 POST-INFLAMMATORY HYPERPIGMENTATION: ICD-10-CM

## 2019-08-13 DIAGNOSIS — L93.2 CUTANEOUS LUPUS ERYTHEMATOSUS: Primary | ICD-10-CM

## 2019-08-13 DIAGNOSIS — L29.9 ITCHING: ICD-10-CM

## 2019-08-13 PROCEDURE — 99999 PR PBB SHADOW E&M-EST. PATIENT-LVL II: CPT | Mod: PBBFAC,HCNC,, | Performed by: DERMATOLOGY

## 2019-08-13 PROCEDURE — 99213 PR OFFICE/OUTPT VISIT, EST, LEVL III, 20-29 MIN: ICD-10-PCS | Mod: HCNC,S$GLB,, | Performed by: DERMATOLOGY

## 2019-08-13 PROCEDURE — 99999 PR PBB SHADOW E&M-EST. PATIENT-LVL II: ICD-10-PCS | Mod: PBBFAC,HCNC,, | Performed by: DERMATOLOGY

## 2019-08-13 PROCEDURE — 1101F PR PT FALLS ASSESS DOC 0-1 FALLS W/OUT INJ PAST YR: ICD-10-PCS | Mod: HCNC,CPTII,S$GLB, | Performed by: DERMATOLOGY

## 2019-08-13 PROCEDURE — 1101F PT FALLS ASSESS-DOCD LE1/YR: CPT | Mod: HCNC,CPTII,S$GLB, | Performed by: DERMATOLOGY

## 2019-08-13 PROCEDURE — 99213 OFFICE O/P EST LOW 20 MIN: CPT | Mod: HCNC,S$GLB,, | Performed by: DERMATOLOGY

## 2019-08-13 RX ORDER — TRIAMCINOLONE ACETONIDE 1 MG/G
CREAM TOPICAL 2 TIMES DAILY
Qty: 80 G | Refills: 2 | Status: SHIPPED | OUTPATIENT
Start: 2019-08-13 | End: 2019-10-21 | Stop reason: DRUGHIGH

## 2019-08-13 NOTE — PROGRESS NOTES
Subjective:       Patient ID:  Ewelina Arnold is a 73 y.o. female who presents for   Chief Complaint   Patient presents with    Rash     72 yo female with longstanding history of cutaneous lupus and SLE, previously on Plaquenil for therapy but had to be stopped due to eye changes noted on a routine monitoring exam.  Since discontinuation, her SLE has not flared, but she has developed numerous red itchy patches, most distressing on the nose and upper lip, as well as both upper arms and back.  Her rheumatologist messaged me in this regard and I sent her Lidex cream but she does not know if this has been working.  She started on Imuran 50 mg by her rheumatologist for the cutaneous plaques 4 weeks ago.  Several of them have already responded.    She does not wear sunblock as she reports she does not leave her house or get sunlight outdoors.    She is fatigued with history of CHF.  In the past was treated with hydralazine.      Review of Systems   Constitutional: Positive for fatigue. Negative for fever and chills.   HENT: Positive for mouth sores (not currently).    Respiratory: Positive for shortness of breath (has CHF).    Musculoskeletal: Negative for joint swelling and arthralgias.   Skin: Positive for itching and rash. Negative for sun sensitivity, daily sunscreen use and activity-related sunscreen use.        Objective:    Physical Exam   Constitutional: She appears well-developed and well-nourished. No distress.   Neurological: She is alert and oriented to person, place, and time. She is not disoriented.   Psychiatric: She has a normal mood and affect.   Skin:   Areas Examined (abnormalities noted in diagram):   Scalp / Hair Palpated and Inspected  Head / Face Inspection Performed  Neck Inspection Performed  Chest / Axilla Inspection Performed  Abdomen Inspection Performed  Back Inspection Performed  RUE Inspected  LUE Inspection Performed                   Diagram Legend     Erythematous scaling macule/papule  c/w actinic keratosis       Vascular papule c/w angioma      Pigmented verrucoid papule/plaque c/w seborrheic keratosis      Yellow umbilicated papule c/w sebaceous hyperplasia      Irregularly shaped tan macule c/w lentigo     1-2 mm smooth white papules consistent with Milia      Movable subcutaneous cyst with punctum c/w epidermal inclusion cyst      Subcutaneous movable cyst c/w pilar cyst      Firm pink to brown papule c/w dermatofibroma      Pedunculated fleshy papule(s) c/w skin tag(s)      Evenly pigmented macule c/w junctional nevus     Mildly variegated pigmented, slightly irregular-bordered macule c/w mildly atypical nevus      Flesh colored to evenly pigmented papule c/w intradermal nevus       Pink pearly papule/plaque c/w basal cell carcinoma      Erythematous hyperkeratotic cursted plaque c/w SCC      Surgical scar with no sign of skin cancer recurrence      Open and closed comedones      Inflammatory papules and pustules      Verrucoid papule consistent consistent with wart     Erythematous eczematous patches and plaques     Dystrophic onycholytic nail with subungual debris c/w onychomycosis     Umbilicated papule    Erythematous-base heme-crusted tan verrucoid plaque consistent with inflamed seborrheic keratosis     Erythematous Silvery Scaling Plaque c/w Psoriasis     See annotation      Assessment / Plan:        Cutaneous lupus erythematosus -   Flaring after d/c of Plaquenil to eye changes  -agree with Imuran per rheumatology given many lesions including facial lesions causing itch to patient   -pt concerned about ongoing cost of Lidex ($40 per tube) will send a generic that should be more affordable but is not as strong.  This should be effective in conjunction with her Imuran.    -     triamcinolone acetonide 0.1% (KENALOG) 0.1 % cream; Apply topically 2 (two) times daily. PRN lupus rashes  Dispense: 80 g; Refill: 2    Post-inflammatory hyperpigmentation  Reassured    Itching  Back - pt with  erythematous xerotic patch -   Denies any new soaps/detergents    Good skin care regimen discussed including limiting to one bath or shower/day, using lukewarm water with mild soap and moisturizing cream to skin 1 - 2x/day. Brochure was provided and reviewed with patient.    Ok to also use TAC 0.1 cream here - can apply with sponge on applicator since lives alone.    No hot showers    OTC claritin daily           Follow up if symptoms worsen or fail to improve.

## 2019-08-20 ENCOUNTER — PATIENT MESSAGE (OUTPATIENT)
Dept: RHEUMATOLOGY | Facility: CLINIC | Age: 74
End: 2019-08-20

## 2019-08-20 DIAGNOSIS — L52 ERYTHEMA NODOSUM: ICD-10-CM

## 2019-08-20 DIAGNOSIS — M32.19 OTHER SYSTEMIC LUPUS ERYTHEMATOSUS WITH OTHER ORGAN INVOLVEMENT: Primary | ICD-10-CM

## 2019-08-22 ENCOUNTER — TELEPHONE (OUTPATIENT)
Dept: PRIMARY CARE CLINIC | Facility: CLINIC | Age: 74
End: 2019-08-22

## 2019-08-22 DIAGNOSIS — Z12.39 BREAST CANCER SCREENING: Primary | ICD-10-CM

## 2019-08-22 NOTE — TELEPHONE ENCOUNTER
----- Message from Diana Tellez sent at 8/22/2019  3:13 PM CDT -----  Contact: Pt   Pt is requesting an order for her annual mammogram.     Pt can be contacted at [phone]

## 2019-08-22 NOTE — TELEPHONE ENCOUNTER
Please place an order for annual mammogram, routed to PCP.    ----- Message from Diana Tellez sent at 8/22/2019  3:13 PM CDT -----  Contact: Pt   Pt is requesting an order for her annual mammogram.     Pt can be contacted at [phone]

## 2019-08-23 ENCOUNTER — LAB VISIT (OUTPATIENT)
Dept: LAB | Facility: HOSPITAL | Age: 74
End: 2019-08-23
Attending: INTERNAL MEDICINE
Payer: MEDICARE

## 2019-08-23 ENCOUNTER — OUTPATIENT CASE MANAGEMENT (OUTPATIENT)
Dept: ADMINISTRATIVE | Facility: OTHER | Age: 74
End: 2019-08-23

## 2019-08-23 DIAGNOSIS — N18.5 ANEMIA IN STAGE 5 CHRONIC KIDNEY DISEASE: ICD-10-CM

## 2019-08-23 DIAGNOSIS — D63.1 ANEMIA IN STAGE 5 CHRONIC KIDNEY DISEASE: ICD-10-CM

## 2019-08-23 LAB
HCT VFR BLD AUTO: 32 % (ref 37–48.5)
HGB BLD-MCNC: 9.6 G/DL (ref 12–16)
IRON SERPL-MCNC: 54 UG/DL (ref 30–160)
SATURATED IRON: 20 % (ref 20–50)
TOTAL IRON BINDING CAPACITY: 274 UG/DL (ref 250–450)
TRANSFERRIN SERPL-MCNC: 185 MG/DL (ref 200–375)

## 2019-08-23 PROCEDURE — 36415 COLL VENOUS BLD VENIPUNCTURE: CPT | Mod: HCNC,PN

## 2019-08-23 PROCEDURE — 85014 HEMATOCRIT: CPT | Mod: HCNC

## 2019-08-23 PROCEDURE — 85018 HEMOGLOBIN: CPT | Mod: HCNC

## 2019-08-23 PROCEDURE — 83540 ASSAY OF IRON: CPT | Mod: HCNC

## 2019-08-23 NOTE — LETTER
August 26, 2019    Ewelina GUAMAN Clayton  [address] [address]             [facility] [address] [place] [address] Dear MsMaribel Ewelina Arnold:    Welcome to [facility]s Complex Care Management Program.  It was a pleasure talking with you today.  My name is Eulalia Lynch RN and I look forward to being your Care Manager.  My goal is to help you function at the healthiest and highest level possible.  You can contact me directly at [phone].     As an [facility] patient with [employer], some of the services we may be able to provide include:      Development of an individualized care plan with a Registered Nurse    Connection with a    Connection with available resources and services     Coordinate communication among your care team members    Provide coaching and education    Help you understand your doctors treatment plan   Help you obtain information about your insurance coverage.     All services provided by [facility]s Complex Care Managers and other care team members are coordinated with and communicated to your primary care team.    As part of your enrollment, you will be receiving education materials and more information about these services in your [facility] account, by phone or through the mail.  If you do not wish to participate or receive information, please contact our office at [phone].      Sincerely,        Eulalia Lynch RN  [facility]   Out-patient RN Complex Care Manager                                                                                --      [facility]:    Eulalia Lynch RN, [facility]- [facility]-    Tel: [phone], Mon-Fri, 8 am- 4:30 pm  Mark Tellez,  [facility]  Tel:   [phone], Mon-Fri, 8 am- 4:30 pm  [facility] Main Office- TEL:  [phone]     Office Hours Mon-Fri, 8 am - 4:30 pm   [facility] On [facility], 24/7 Nurse Help Line  (non emergent)- TEL:  [phone]        [employer]:    [employer] First 24 Hour Nurse Line--TEL:  [phone]  [employer]- Over-the-Counter Benefit-- TEL:  [phone]  Liz Torres, [employer] Representative-- TEL:  [phone], ext [phone] can help to change [employer] plan to reflect either Diabetes or Heart Disease or both in order to lower co pay costs for medical appointments.   [employer] Exercise- [place], Richy Helms [facility]  [employer]- Well Dine -TEL:  [phone].  Call once home within 30 days after a discharge from an Inpatient Stay at the Hospital, Rehab or Skilled Facility. 10 meals are delivered to your home from [employer]. All meals are Heart Healthy, Low Sodium.   [employer]- Transportation Reservation-- TEL:  [phone]  Mon-Fri, 8 am-5 pm, 3 business days notice required.     If you have any questions or concerns, please don't hesitate to call.    Sincerely,        Eulalia Lynch RN

## 2019-08-26 ENCOUNTER — PATIENT MESSAGE (OUTPATIENT)
Dept: DERMATOLOGY | Facility: CLINIC | Age: 74
End: 2019-08-26
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
## 2019-08-27 ENCOUNTER — OUTPATIENT CASE MANAGEMENT (OUTPATIENT)
Dept: ADMINISTRATIVE | Facility: OTHER | Age: 74
End: 2019-08-27

## 2019-08-27 ENCOUNTER — TELEPHONE (OUTPATIENT)
Dept: DERMATOLOGY | Facility: CLINIC | Age: 74
End: 2019-08-27

## 2019-08-27 ENCOUNTER — OFFICE VISIT (OUTPATIENT)
Dept: DERMATOLOGY | Facility: CLINIC | Age: 74
End: 2019-08-27
Payer: MEDICARE

## 2019-08-27 ENCOUNTER — INFUSION (OUTPATIENT)
Dept: INFECTIOUS DISEASES | Facility: HOSPITAL | Age: 74
End: 2019-08-27
Attending: INTERNAL MEDICINE
Payer: MEDICARE

## 2019-08-27 ENCOUNTER — TELEPHONE (OUTPATIENT)
Dept: PRIMARY CARE CLINIC | Facility: CLINIC | Age: 74
End: 2019-08-27

## 2019-08-27 VITALS
SYSTOLIC BLOOD PRESSURE: 136 MMHG | BODY MASS INDEX: 19.14 KG/M2 | HEIGHT: 65 IN | DIASTOLIC BLOOD PRESSURE: 61 MMHG | WEIGHT: 114.88 LBS

## 2019-08-27 DIAGNOSIS — L93.0 LUPUS ERYTHEMATOSUS, UNSPECIFIED FORM: Primary | ICD-10-CM

## 2019-08-27 DIAGNOSIS — D63.1 ANEMIA OF CHRONIC RENAL FAILURE, STAGE 4 (SEVERE): Primary | ICD-10-CM

## 2019-08-27 DIAGNOSIS — N18.4 ANEMIA OF CHRONIC RENAL FAILURE, STAGE 4 (SEVERE): Primary | ICD-10-CM

## 2019-08-27 PROCEDURE — 1101F PR PT FALLS ASSESS DOC 0-1 FALLS W/OUT INJ PAST YR: ICD-10-PCS | Mod: HCNC,CPTII,S$GLB, | Performed by: DERMATOLOGY

## 2019-08-27 PROCEDURE — 99213 PR OFFICE/OUTPT VISIT, EST, LEVL III, 20-29 MIN: ICD-10-PCS | Mod: HCNC,S$GLB,, | Performed by: DERMATOLOGY

## 2019-08-27 PROCEDURE — 99213 OFFICE O/P EST LOW 20 MIN: CPT | Mod: HCNC,S$GLB,, | Performed by: DERMATOLOGY

## 2019-08-27 PROCEDURE — 1101F PT FALLS ASSESS-DOCD LE1/YR: CPT | Mod: HCNC,CPTII,S$GLB, | Performed by: DERMATOLOGY

## 2019-08-27 PROCEDURE — 99999 PR PBB SHADOW E&M-EST. PATIENT-LVL II: CPT | Mod: PBBFAC,HCNC,, | Performed by: DERMATOLOGY

## 2019-08-27 PROCEDURE — 96372 THER/PROPH/DIAG INJ SC/IM: CPT | Mod: HCNC

## 2019-08-27 PROCEDURE — 3078F PR MOST RECENT DIASTOLIC BLOOD PRESSURE < 80 MM HG: ICD-10-PCS | Mod: HCNC,CPTII,S$GLB, | Performed by: DERMATOLOGY

## 2019-08-27 PROCEDURE — 63600175 PHARM REV CODE 636 W HCPCS: Mod: JG,HCNC | Performed by: INTERNAL MEDICINE

## 2019-08-27 PROCEDURE — 3078F DIAST BP <80 MM HG: CPT | Mod: HCNC,CPTII,S$GLB, | Performed by: DERMATOLOGY

## 2019-08-27 PROCEDURE — 3074F PR MOST RECENT SYSTOLIC BLOOD PRESSURE < 130 MM HG: ICD-10-PCS | Mod: HCNC,CPTII,S$GLB, | Performed by: DERMATOLOGY

## 2019-08-27 PROCEDURE — 3074F SYST BP LT 130 MM HG: CPT | Mod: HCNC,CPTII,S$GLB, | Performed by: DERMATOLOGY

## 2019-08-27 PROCEDURE — 99999 PR PBB SHADOW E&M-EST. PATIENT-LVL II: ICD-10-PCS | Mod: PBBFAC,HCNC,, | Performed by: DERMATOLOGY

## 2019-08-27 RX ORDER — BETAMETHASONE DIPROPIONATE 0.5 MG/G
OINTMENT TOPICAL 2 TIMES DAILY
Qty: 45 G | Refills: 3 | Status: SHIPPED | OUTPATIENT
Start: 2019-08-27

## 2019-08-27 RX ADMIN — DARBEPOETIN ALFA 60 MCG: 60 INJECTION, SOLUTION INTRAVENOUS; SUBCUTANEOUS at 08:08

## 2019-08-27 NOTE — PROGRESS NOTES
Hgb 9.6;Hct 32.0    No dose change  PER PROTOCOL FORM DROD-188 due to being increased on 7/1/19 instead we decrease week between injection to 2 weeks.  Aranesp 60 mcg given and 2 week return appt set.  . Dr Epperson notified.     Gfr 12.5/stage 5

## 2019-08-27 NOTE — TELEPHONE ENCOUNTER
----- Message from Meaghan Burgess MA sent at 8/26/2019  2:27 PM CDT -----  Contact: Eulalia Lynch RN  Please see below and advise thank you   ----- Message -----  From: Eulalia Lynch RN  Sent: 8/26/2019   2:16 PM  To: Nat Canseco Staff    Dr. Johns/Staff:    Pt enrolled into OPCM with following med questions:  1)  Lasix 40 mg -- pt taking BID but in EPID listed prn wt gain. Pt concerned about her wt drop               and feeling weaker than usual.     8/23 =109, 8/24 =110, 8/2572=721.1, 8/2679=537.6.                                         8/25 am ZG=909/73, pm KX=573/76, 8/26 am NX=206/71.     2) Estrogen PO-  Listed in Epic,  pt first says it is PRN and then clarifies that she is not sure if she still takes estrogen. Pt reports no longer seeing the prescribing GYN or any GYN at present.     Please advise.   Thank you,  Eulalia Lynch, AKILAN, RN, CCM [facility]  [email or web address]  TEL:  [phone]

## 2019-08-27 NOTE — PROGRESS NOTES
Subjective:       Patient ID:  Ewelina Arnold is a 73 y.o. female who presents for   Chief Complaint   Patient presents with    Rash     Pt presents today for f/u, new flare up, itching, right and left arms, Tx. TAC     Rash  - Follow-up  Symptom course: unchanged  Affected locations: left arm and right arm  Signs / symptoms: itching and redness  Severity: mild to moderate    Here for ongoing management of cutaneous lupus, in patient with hx of SLE maintained on Plaquenil 200 mg po bid x decades but stopped several months ago due to ocular toxicity.  She has now been on Imuran 50 mg accordingly, primarily for her skin involvement, as SLE is quiescent at this time per rheum.  This skin rash started last summer and biopsy confirmed lupus 6/2018:  FINAL PATHOLOGIC DIAGNOSIS  1. Skin, left upper arm, punch biopsy:  - VACUOLAR INTERFACE DERMATITIS, CONSISTENT WITH A CONNECTIVE TISSUE DISEASE PROCESS  SUCH AS LUPUS.    She continues to develop lesions on the upper arms and chest. Has a healing lesion on nose. At last visit she had healing spots on the back and chest; these have now resolved. She was unable to fill the potent cortisone cream that I intended, using a moderate potency cream.    Review of Systems   Constitutional: Negative for fever, chills, weight loss, weight gain, fatigue, night sweats and malaise.   Skin: Positive for rash. Negative for daily sunscreen use and activity-related sunscreen use.   Hematologic/Lymphatic: Does not bruise/bleed easily.        Objective:    Physical Exam   Constitutional: She appears well-developed and well-nourished. No distress.   Neurological: She is alert and oriented to person, place, and time. She is not disoriented.   Psychiatric: She has a normal mood and affect.   Skin:   Areas Examined (abnormalities noted in diagram):   Scalp / Hair Palpated and Inspected  Head / Face Inspection Performed  Neck Inspection Performed  Chest / Axilla Inspection Performed  Back  Inspection Performed  RUE Inspected  LUE Inspection Performed              Diagram Legend     Erythematous scaling macule/papule c/w actinic keratosis       Vascular papule c/w angioma      Pigmented verrucoid papule/plaque c/w seborrheic keratosis      Yellow umbilicated papule c/w sebaceous hyperplasia      Irregularly shaped tan macule c/w lentigo     1-2 mm smooth white papules consistent with Milia      Movable subcutaneous cyst with punctum c/w epidermal inclusion cyst      Subcutaneous movable cyst c/w pilar cyst      Firm pink to brown papule c/w dermatofibroma      Pedunculated fleshy papule(s) c/w skin tag(s)      Evenly pigmented macule c/w junctional nevus     Mildly variegated pigmented, slightly irregular-bordered macule c/w mildly atypical nevus      Flesh colored to evenly pigmented papule c/w intradermal nevus       Pink pearly papule/plaque c/w basal cell carcinoma      Erythematous hyperkeratotic cursted plaque c/w SCC      Surgical scar with no sign of skin cancer recurrence      Open and closed comedones      Inflammatory papules and pustules      Verrucoid papule consistent consistent with wart     Erythematous eczematous patches and plaques     Dystrophic onycholytic nail with subungual debris c/w onychomycosis     Umbilicated papule    Erythematous-base heme-crusted tan verrucoid plaque consistent with inflamed seborrheic keratosis     Erythematous Silvery Scaling Plaque c/w Psoriasis     See annotation      Assessment / Plan:        Lupus erythematosus, cutaneous - biopsy proven -   Continues to flare and develop new lesions  Pt otherwise well without systemic symptoms of lupus at this time - right now pt with 3 active plaques on arms and chest.  Nose and upper lip is healing.  I would not quantify her skin as severe involvement, but it is distressing to her.    I will message Dr Ramirez about next appropriate interventions.  Ideally an increase in the dose of Imuran would lessen her cutaneous  disease (doses of 100 - 150 mg have been used for this condition).  Pt with known anemia and hx of cardiac disease, as well as renal disease.    Other systemic options would be methotrexate and Cellcept if higher dose Imuran is not tolerated or unsuccessful in limiting her flares. Acitretin has also been described but can be difficult to tolerate due to side effects (dry skin, dry mouth) and moreover its use is not indicated in patients with severe renal dysfunction.    In meantime will send potent cortisone ointment via [employer] (price is quoted as $46 on [employer])  -     betamethasone dipropionate (DIPROLENE) 0.05 % ointment; Apply topically 2 (two) times daily. To lupus rashes PRN  Dispense: 45 g; Refill: 3    Sun protection again discussed.         No follow-ups on file.

## 2019-08-27 NOTE — TELEPHONE ENCOUNTER
Spoke with pt, offered her appointment later this morning. Accepted.    ----- Message from Celina Nereyda sent at 8/27/2019  8:14 AM CDT -----  Contact: Ewelina   tel:    [phone]   Pt.says she is at the hospital now and wants to see you this a.m..   Says she usually does not have a ride .     Wants to see you while she is here now.   Pt. Says she would only like to see you and asks that you pls call her w/an appt. To come up .

## 2019-08-27 NOTE — TELEPHONE ENCOUNTER
----- Message from Eulalia Lynch RN sent at 8/27/2019  3:56 PM CDT -----  Contact: FLACA Altamirano Dr.:  KENDRICK only.  In answer to who is the prescriber for the Estrogen- it remains unclear. Pt reports having only the Estrogen cream -no name-and never had it PO as listed in Epic.      Pt states understanding the wt parameters to guide the Lasix regimen.     Sincerely,  SANTHOSH Alatmirano, RN, CCM [facility]  [email or web address]  TEL:  [phone]

## 2019-08-27 NOTE — Clinical Note
Misha Ramriez!  Saw Ms. Arnold today: her skin is really not too active, but she is very distressed by getting 3 new lupus lesions. The areas on her face and back that were present at her last visit with me are responding well.  I think that if we could increase her Imuran (to 75, or ideally, 100 mg qd) she may have a better response if you think it to be medically tolerated given her comordibities. If not, I would suggest changing to methotrexate.  I let her know that we would confer and get back to her about recommendations.  Thanks! Cristina

## 2019-08-27 NOTE — PROGRESS NOTES
8/27/19  Summary:  Call to pt to provide PCP feedback on med questions:  Dr. Johns confirms Lasix 40 mg is BID & PRN, wt goal for pt is 109-111, if wt drops below she can skip a dose of Lasix. Pt prone to dehydration. Pt to call PCP with any questions. The rx of Estrogen prescriber is unclear.  Pt states her understanding on how to take Lasix rx and to monitor for its need based on wts.   Pt says that she only has Estrogen cream for prn use and no tablet form, no prescriber to mention.   Pt s/p DERM appt today after advised by Dr. Ramirez, RHEUM who is in collaboration with Dr Cristina Pena, DERM and dosage needs for Imuran treatment.     . Interventions:  Med discrepancies clarified. Pt states her understanding of Lasix use, Imuran dose per RHEUM, Estrogen cream vs tablet - for now has cream prn. Updated PCP of pt's understanding of Lasix, estrogen unclear.   Provided pt with the contact for the  [facility] Office in response to her asking for the number today.   Attempted to reach [facility] , Cecile Aparicio-- message sent.     Plan:  F/u on  at clinic resource and update pt.   F/u with PCP and RHEUM responses to messages. Done 8/27  Resources- f/u on OPCM LCSW assessment & resources, PAP,   CKD/ANEMIA- f/u on mailing receipt, provide education on renal diet and anemia management  CHF- f/u on mailing receipt, monitor wts and response to meds, s/s complications/action plan  MED REC- f/u on med clarifications for Lasix, Estrogen, Imuran. Done 8/27    Todays OPCM Self-Management Care Plan was developed with the patients/caregivers input and was based on identified barriers from todays assessment.  Goals were written today with the patient/caregiver and the patient has agreed to work towards these goals to improve his/her overall well-being. Patient verbalized understanding of the care plan, goals, and all of today's instructions. Encouraged patient/caregiver  to communicate with his/her physician and health care team about health conditions and the treatment plan.  Provided my contact information today and encouraged patient/caregiver to call me with any questions as needed.

## 2019-08-27 NOTE — TELEPHONE ENCOUNTER
The lasix order states BID AND PRN. So she should be taking BID and more as needed. Given the heat, she can skip doses if her weight gets below goal, she becomes dehydrated or has a viral illness with vomiting/diarrhea. She can always call the office to clarify if she has questions. Her goal weight is 109-111. If she drops below that she can skip a dose.    Her estrogen was started in 4/2019, but it isn't clear who the prescriber was. Does she have a name on the bottle.    Thanks,  WENDY

## 2019-08-28 ENCOUNTER — TELEPHONE (OUTPATIENT)
Dept: PHARMACY | Facility: CLINIC | Age: 74
End: 2019-08-28

## 2019-08-28 ENCOUNTER — LAB VISIT (OUTPATIENT)
Dept: LAB | Facility: HOSPITAL | Age: 74
End: 2019-08-28
Attending: INTERNAL MEDICINE
Payer: MEDICARE

## 2019-08-28 DIAGNOSIS — M32.19 OTHER SYSTEMIC LUPUS ERYTHEMATOSUS WITH OTHER ORGAN INVOLVEMENT: Chronic | ICD-10-CM

## 2019-08-28 LAB
BASOPHILS # BLD AUTO: 0.02 K/UL (ref 0–0.2)
BASOPHILS NFR BLD: 0.8 % (ref 0–1.9)
DIFFERENTIAL METHOD: ABNORMAL
EOSINOPHIL # BLD AUTO: 0.2 K/UL (ref 0–0.5)
EOSINOPHIL NFR BLD: 7.6 % (ref 0–8)
ERYTHROCYTE [DISTWIDTH] IN BLOOD BY AUTOMATED COUNT: 14.7 % (ref 11.5–14.5)
HCT VFR BLD AUTO: 30.3 % (ref 37–48.5)
HGB BLD-MCNC: 9.6 G/DL (ref 12–16)
IMM GRANULOCYTES # BLD AUTO: 0 K/UL (ref 0–0.04)
IMM GRANULOCYTES NFR BLD AUTO: 0 % (ref 0–0.5)
LYMPHOCYTES # BLD AUTO: 0.5 K/UL (ref 1–4.8)
LYMPHOCYTES NFR BLD: 22.9 % (ref 18–48)
MCH RBC QN AUTO: 27 PG (ref 27–31)
MCHC RBC AUTO-ENTMCNC: 31.7 G/DL (ref 32–36)
MCV RBC AUTO: 85 FL (ref 82–98)
MONOCYTES # BLD AUTO: 0.4 K/UL (ref 0.3–1)
MONOCYTES NFR BLD: 18.6 % (ref 4–15)
NEUTROPHILS # BLD AUTO: 1.2 K/UL (ref 1.8–7.7)
NEUTROPHILS NFR BLD: 50.1 % (ref 38–73)
NRBC BLD-RTO: 0 /100 WBC
PLATELET # BLD AUTO: 185 K/UL (ref 150–350)
PMV BLD AUTO: 10.8 FL (ref 9.2–12.9)
RBC # BLD AUTO: 3.55 M/UL (ref 4–5.4)
WBC # BLD AUTO: 2.36 K/UL (ref 3.9–12.7)

## 2019-08-28 PROCEDURE — 36415 COLL VENOUS BLD VENIPUNCTURE: CPT | Mod: HCNC,PO

## 2019-08-28 PROCEDURE — 85025 COMPLETE CBC W/AUTO DIFF WBC: CPT | Mod: HCNC

## 2019-08-29 ENCOUNTER — TELEPHONE (OUTPATIENT)
Dept: OPHTHALMOLOGY | Facility: CLINIC | Age: 74
End: 2019-08-29

## 2019-08-29 ENCOUNTER — TELEPHONE (OUTPATIENT)
Dept: RHEUMATOLOGY | Facility: CLINIC | Age: 74
End: 2019-08-29

## 2019-08-29 RX ORDER — TRIAMCINOLONE ACETONIDE 1 MG/G
PASTE DENTAL
Refills: 3 | COMMUNITY
Start: 2019-08-07 | End: 2019-10-21 | Stop reason: DRUGHIGH

## 2019-08-29 NOTE — TELEPHONE ENCOUNTER
----- Message from Lance Roberts sent at 8/29/2019 10:47 AM CDT -----  Contact: Ewelina  MsMaribel Clayton says that she has been waiting for someone from Dr. Mccray to return her call. It's in regards to scheduling an appointment. She can be reached at [phone].

## 2019-08-29 NOTE — TELEPHONE ENCOUNTER
----- Message from Karyn Newell sent at 8/29/2019 10:45 AM CDT -----  Contact: self  Pt was calling to let the provider know that she had her blood work done on yesterday.

## 2019-08-29 NOTE — TELEPHONE ENCOUNTER
I called and discussed low WBC with her    Derm suggested increasing AZA but WBC is 2.36 so will not do this    I advised her to cont AZA 50 mg/d for now ; if it falls further then will switch from AZA to MMF    Please add CBC to next scheduled lab on Sept 18 so I can recheck WBC

## 2019-09-06 ENCOUNTER — PATIENT MESSAGE (OUTPATIENT)
Dept: ADMINISTRATIVE | Facility: OTHER | Age: 74
End: 2019-09-06

## 2019-09-06 ENCOUNTER — TELEPHONE (OUTPATIENT)
Dept: PRIMARY CARE CLINIC | Facility: CLINIC | Age: 74
End: 2019-09-06

## 2019-09-06 ENCOUNTER — OUTPATIENT CASE MANAGEMENT (OUTPATIENT)
Dept: ADMINISTRATIVE | Facility: OTHER | Age: 74
End: 2019-09-06

## 2019-09-06 ENCOUNTER — TELEPHONE (OUTPATIENT)
Dept: PHARMACY | Facility: CLINIC | Age: 74
End: 2019-09-06

## 2019-09-06 NOTE — PROGRESS NOTES
9/6/19  Summary:  F/u call to pt to update care plan. Pt reports feeling okay. Pt reports drop in wt yest 106.9 & held Lasix dose. Today's AM Wt= 108.9. Pt states she plans to hold the Lasix all day. Pt says she is trying to drink more fluids since she feels she is likely dehydrated. Pt reports no changes to Imuran dose. Pt says she spoke with [facility] Financial Assistance trying to get lower copays from existing lower copays ($25/visit) through her [facility] Plus Plan. Pt reports she was not eligible for additional assistance. When discussing CKD diet, pt reports already seeing a Nutritionist and request that information mailed out be remailed-- info was misplaced.  Pt declines wish to pursue scooter for now. If anything, she would be interested in info on community assist with home ramp installations. Will advise hospitals LMSW, Adelina Lucio. Pt asks about hospitalsW referral since she has not been contacted yet. Pt is aware of PCP appt 9/9.     Interventions:  Apologized to pt in the hospitals LCSW delay. Original referral to Mark Tellez LCSW was referred Adelina Lucio [facility].   Collaborated with Adelina Lucio [facility].    Restated PCP's guidelines for taking Lasix 40 mg BID & PRN, with wt goal 109 -111 lbs. Hold Lasix dose for wts <109.   Educated pt in foods high in Potassium to keep limited 2* CKD IV- Krames CKD Diet---ie  Milk, yogurt, cheese, dried beans, potatoes, bananas, tomatoes, cantalope, honeydew melon, dried fruits & nuts.   Caution advised since pt is taking Lasix that depletes body of K+. Recommended that pt seek specific guidelines from her PCP and/or Nephro.   In basket message to , PCP to seek pt advice on managing restricted K+ and Fluids 2* CKD while on Lasix and treated for CHF. PCP appt 9/9.   Provided pt contact for Cecile Aparicio, [facility] Financial Counselor however stated lowering pt's copays would not be possible.   Education materials sent to pt via Pt Portal and  via postal mail along with [facility] info.     Plan:  F/u on PCP appt 9/9 outcomes.   Resources- f/u on OPCM LCSW assessment & resources, PAP. Pending 9/6  CKD/ANEMIA- f/u on mailing receipt, provide education on renal diet and anemia management---remailed 9/6.   CHF- f/u on mailing receipt, monitor wts and response to meds, s/s complications/action plan        Todays OPCM Self-Management Care Plan was developed with the patients/caregivers input and was based on identified barriers from todays assessment.  Goals were written today with the patient/caregiver and the patient has agreed to work towards these goals to improve his/her overall well-being. Patient verbalized understanding of the care plan, goals, and all of today's instructions. Encouraged patient/caregiver to communicate with his/her physician and health care team about health conditions and the treatment plan.  Provided my contact information today and encouraged patient/caregiver to call me with any questions as needed.

## 2019-09-06 NOTE — TELEPHONE ENCOUNTER
----- Message from Meaghan Burgess MA sent at 9/6/2019  1:16 PM CDT -----  Contact: Eulalia Lynch RN      ----- Message -----  From: Eulalia Lynch RN  Sent: 9/6/2019  12:17 PM  To: Nat Canseco Staff    Dr. Johns/Staff:    Pt to see PCP 9/9 at 7:30 am, pt aware.   Please advise pt further on CKD K+ and Fluid restrictions in view of pt's specific guidelines with Lasix use and CHF dx.    Pt saw a Nutritionist last year and doesn't feel she needs to again. Information resent to pt on CKD diet after original info misplaced by pt.     Thank you,    SANTHOSH Altamirano, RN, CCM [facility]  [email or web address]  TEL:  [phone]

## 2019-09-09 ENCOUNTER — INFUSION (OUTPATIENT)
Dept: INFECTIOUS DISEASES | Facility: HOSPITAL | Age: 74
End: 2019-09-09
Attending: INTERNAL MEDICINE
Payer: MEDICARE

## 2019-09-09 ENCOUNTER — OFFICE VISIT (OUTPATIENT)
Dept: PRIMARY CARE CLINIC | Facility: CLINIC | Age: 74
End: 2019-09-09
Payer: MEDICARE

## 2019-09-09 ENCOUNTER — TELEPHONE (OUTPATIENT)
Dept: RESEARCH | Facility: HOSPITAL | Age: 74
End: 2019-09-09

## 2019-09-09 ENCOUNTER — OUTPATIENT CASE MANAGEMENT (OUTPATIENT)
Dept: ADMINISTRATIVE | Facility: OTHER | Age: 74
End: 2019-09-09

## 2019-09-09 VITALS
SYSTOLIC BLOOD PRESSURE: 121 MMHG | DIASTOLIC BLOOD PRESSURE: 58 MMHG | SYSTOLIC BLOOD PRESSURE: 124 MMHG | WEIGHT: 114.88 LBS | HEART RATE: 58 BPM | HEIGHT: 64 IN | DIASTOLIC BLOOD PRESSURE: 55 MMHG | HEIGHT: 64 IN | BODY MASS INDEX: 19.96 KG/M2 | BODY MASS INDEX: 19.61 KG/M2 | WEIGHT: 116.88 LBS

## 2019-09-09 DIAGNOSIS — D63.1 ANEMIA IN STAGE 5 CHRONIC KIDNEY DISEASE: Primary | ICD-10-CM

## 2019-09-09 DIAGNOSIS — E44.0 MALNUTRITION OF MODERATE DEGREE: ICD-10-CM

## 2019-09-09 DIAGNOSIS — N18.5 ANEMIA IN STAGE 5 CHRONIC KIDNEY DISEASE: Primary | ICD-10-CM

## 2019-09-09 DIAGNOSIS — D63.1 ANEMIA OF CHRONIC RENAL FAILURE, STAGE 4 (SEVERE): ICD-10-CM

## 2019-09-09 DIAGNOSIS — N18.4 CKD (CHRONIC KIDNEY DISEASE), STAGE IV: ICD-10-CM

## 2019-09-09 DIAGNOSIS — N18.4 ANEMIA OF CHRONIC RENAL FAILURE, STAGE 4 (SEVERE): ICD-10-CM

## 2019-09-09 PROCEDURE — 3074F SYST BP LT 130 MM HG: CPT | Mod: HCNC,CPTII,S$GLB, | Performed by: INTERNAL MEDICINE

## 2019-09-09 PROCEDURE — 99499 UNLISTED E&M SERVICE: CPT | Mod: HCNC,S$GLB,, | Performed by: INTERNAL MEDICINE

## 2019-09-09 PROCEDURE — 3078F DIAST BP <80 MM HG: CPT | Mod: HCNC,CPTII,S$GLB, | Performed by: INTERNAL MEDICINE

## 2019-09-09 PROCEDURE — 1101F PT FALLS ASSESS-DOCD LE1/YR: CPT | Mod: HCNC,CPTII,S$GLB, | Performed by: INTERNAL MEDICINE

## 2019-09-09 PROCEDURE — 3078F PR MOST RECENT DIASTOLIC BLOOD PRESSURE < 80 MM HG: ICD-10-PCS | Mod: HCNC,CPTII,S$GLB, | Performed by: INTERNAL MEDICINE

## 2019-09-09 PROCEDURE — 3074F PR MOST RECENT SYSTOLIC BLOOD PRESSURE < 130 MM HG: ICD-10-PCS | Mod: HCNC,CPTII,S$GLB, | Performed by: INTERNAL MEDICINE

## 2019-09-09 PROCEDURE — 1101F PR PT FALLS ASSESS DOC 0-1 FALLS W/OUT INJ PAST YR: ICD-10-PCS | Mod: HCNC,CPTII,S$GLB, | Performed by: INTERNAL MEDICINE

## 2019-09-09 PROCEDURE — 63600175 PHARM REV CODE 636 W HCPCS: Mod: JG,HCNC,EC | Performed by: INTERNAL MEDICINE

## 2019-09-09 PROCEDURE — 99215 OFFICE O/P EST HI 40 MIN: CPT | Mod: HCNC,S$GLB,, | Performed by: INTERNAL MEDICINE

## 2019-09-09 PROCEDURE — 96372 THER/PROPH/DIAG INJ SC/IM: CPT | Mod: HCNC

## 2019-09-09 PROCEDURE — 99215 PR OFFICE/OUTPT VISIT, EST, LEVL V, 40-54 MIN: ICD-10-PCS | Mod: HCNC,S$GLB,, | Performed by: INTERNAL MEDICINE

## 2019-09-09 PROCEDURE — 99499 RISK ADDL DX/OHS AUDIT: ICD-10-PCS | Mod: HCNC,S$GLB,, | Performed by: INTERNAL MEDICINE

## 2019-09-09 RX ADMIN — DARBEPOETIN ALFA 100 MCG: 100 INJECTION, SOLUTION INTRAVENOUS; SUBCUTANEOUS at 08:09

## 2019-09-09 NOTE — ASSESSMENT & PLAN NOTE
Low BMI, poor PO intake, uncontrolled chronic diseases  · Advised improved nutrition  · Low salt diet  · Refer to [facility] [place] for assistance with serge

## 2019-09-09 NOTE — PROGRESS NOTES
Hgb 9.6;Hct 30.0    Dose increased  PER PROTOCOL FORM DROD-188 from Aranesp 60 mcg to Aranesp 100 mcg.  Aranesp 100 mcg given and 2 week return appt set.  Dr Epperson notified.    Gfr 12.5/stage 5

## 2019-09-09 NOTE — PROGRESS NOTES
Primary Care Provider Appointment    Subjective:      Patient ID: Ewelina Arnold is a 73 y.o. female with amyloidosis, HTN, CKD, debility, CHF    Chief Complaint: Chronic Kidney Disease; Hypertension; and Congestive Heart Failure    Some improvement with treatment for amyloid. Kidney function has improvement. Daughter reports improvement in functional status.    Her cutaneous lupus is worsening. Seen by Derm with increase in steroid cream. Imuran contined. Her white count is decreasing. Dr Ramirez does not want to increase immunosuppression.    Dr Epperson checked blood counts. Has arenesp injection today.    Has numerous pos blood iFOBT. Repeat colonoscopy in 5 years for surveillance (last performed in 2017).     Her glucose is consistently low on labs. She eats dinner late at night, then doesn't want to eat throughout the day. Has malnutrition.     BP more stable. Does not have large swings like she did before treatment for amyloidosis. Has highs in the 160s/90s.    Pills packed as follows, patient compliant:  Adherence packed for 28 days using Dispill:     Morning card:  Aspirin 81 mg  Carvedilol 25 mg  Citalopram 20 mg (1&1/2 tabs)  Famotidine 20 mg  Levothyroxine 75 mcg  Nifedipine ER Osmotic 60 mg  Sodium Bicarb 650 mg  Vitamin D 07631 IU (Once weekly in the Saturday pocket)        Evening card:  Atorvastatin 80 mg  Carvedilol 25 mg  Nifedipine ER Osmotic 60 mg  Sodium Bicarb 650 mg        PRN:  Doxazosin 2 mg (given in a separate bottle)      Electronically signed by Maria Dolores Ann at 8/12/2019  3:53 PM      Past Surgical History:   Procedure Laterality Date    BIOPSY-BONE MARROW Left 10/16/2017    Per[facility]an [facility] at [facility] OR 2ND FLR    CARDIAC CATHETERIZATION  07/27/2011    x1    CHOLECYSTECTOMY  1999    COLON SURGERY  2000 & 2003    partial removal for CA    COLONOSCOPY N/A 9/14/2017    [facility]en [facility] at [facility] ENDO (4TH FLR)    COLONOSCOPY N/A 4/14/2016    Performed by Jose  NIDHI [facility]en [facility] at [facility] ENDO (4TH FLR)    COLONOSCOPY N/A 4/23/2013    Performed [facility] at [facility] ENDO (4TH FLR)    TALIA-TRANSFORAMINAL Left 11/3/2016    [facility]    ESOPHAGOGASTRODUODENOSCOPY (EGD) N/A 3/16/2017    Per[facility]esh [facility]andes [facility] at [facility] ENDO    EYE SURGERY      HAND SURGERY Bilateral 1996 & 1997    due to carpal tunnel     HERNIA REPAIR      HYSTERECTOMY  1983    INJECTION-STEROID-EPIDURAL-TRANSFORAMINAL Left 1/7/2016    [facility]    Left heart cath Left 12/20/2017    Performed by Chandan Ly MD at [facility] CATH LAB    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    OOPHORECTOMY      removed one    Peripheral Iridotomy both eyes  1994    laser angle correction    THYROIDECTOMY, PARTIAL  2006    to remove two nodules and one-half thyroid       Past Medical History:   Diagnosis Date    Acute hypoxemic respiratory failure 4/28/2018    Acute on chronic diastolic congestive heart failure 11/17/2017    Allergy     Anatomical narrow angle glaucoma     Anemia     States diagnosed about a month ago    Aortic atherosclerosis     Arthritis     Cataract     CHF (congestive heart failure)     Chronic kidney disease     Chronic sciatica of left side     Right lower back pain due to sciatica    Coronary artery disease     Depression     Dry eyes     GERD (gastroesophageal reflux disease)     History of colon cancer     Hyperaldosteronism     Hyperlipidemia     Hypertension     Hypothyroidism     Keloid cicatrix     Left ventricular diastolic dysfunction with preserved systolic function     Lupus (systemic lupus erythematosus) 8/17/2012    Lupus (systemic lupus erythematosus)     Malnutrition 5/16/2017    Osteoarthritis of cervical spine 8/17/2012    Osteopenia     PAD (peripheral artery disease)     FRAN (renal artery stenosis)        Social History     Socioeconomic  History    Marital status: Single     Spouse name: Not on file    Number of children: Not on file    Years of education: Not on file    Highest education level: Not on file   Occupational History     Employer: Retired    Social Needs    Financial resource strain: Not very hard    Food insecurity:     Worry: Never true     Inability: Never true    Transportation needs:     Medical: Yes     Non-medical: Yes   Tobacco Use    Smoking status: Former Smoker     Packs/day: 1.50     Years: 30.00     Pack years: 45.00     Types: Cigarettes     Start date:      Last attempt to quit: 3/13/1985     Years since quittin.5    Smokeless tobacco: Never Used   Substance and Sexual Activity    Alcohol use: No    Drug use: No    Sexual activity: Not Currently     Partners: Male     Birth control/protection: Abstinence   Lifestyle    Physical activity:     Days per week: Not on file     Minutes per session: Not on file    Stress: Not on file   Relationships    Social connections:     Talks on phone: Not on file     Gets together: Not on file     Attends Cheondoism service: Not on file     Active member of club or organization: Not on file     Attends meetings of clubs or organizations: Not on file     Relationship status: Not on file   Other Topics Concern    Are you pregnant or think you may be? No    Breast-feeding No   Social History Narrative    Not on file       Review of Systems   Constitutional: Positive for activity change and fatigue. Negative for appetite change.   Respiratory: Positive for shortness of breath. Negative for cough.    Cardiovascular: Negative for leg swelling.   Endocrine: Positive for cold intolerance.   Genitourinary: Negative for difficulty urinating and dysuria.   Skin: Positive for wound.   Neurological: Negative for dizziness and light-headedness.   Hematological: Bruises/bleeds easily.   Psychiatric/Behavioral: Positive for agitation. Negative for behavioral problems, confusion,  "decreased concentration and dysphoric mood.       Objective:   BP (!) 124/58   Pulse (!) 58   Ht 5' 4" (1.626 m)   Wt 52.1 kg (114 lb 13.8 oz)   LMP  (LMP Unknown) Comment: 99  BMI 19.72 kg/m²     Physical Exam   Constitutional: She is oriented to person, place, and time. She appears well-developed and well-nourished.   HENT:   Head: Normocephalic and atraumatic.   Neck: Normal range of motion.   Pulmonary/Chest: Effort normal.   Musculoskeletal: She exhibits deformity. She exhibits no edema.   Resolution of LE nodules   Neurological: She is alert and oriented to person, place, and time.   Psychiatric: She has a normal mood and affect. Her behavior is normal. Thought content normal.   Vitals reviewed.      Lab Results   Component Value Date    WBC 2.36 (L) 08/28/2019    HGB 9.6 (L) 09/09/2019    HCT 30.0 (L) 09/09/2019     08/28/2019    CHOL 181 08/28/2018    TRIG 54 08/28/2018    HDL 66 08/28/2018    ALT 9 (L) 08/02/2019    AST 25 08/02/2019     08/02/2019    K 4.1 08/02/2019     08/02/2019    CREATININE 3.9 (H) 08/02/2019    BUN 79 (H) 08/02/2019    CO2 26 08/02/2019    TSH 1.798 01/25/2019    INR 1.0 09/05/2018    GLUF 79 12/02/2011    HGBA1C 5.2 09/25/2018       RESULTS: Reviewed labs and images today    Assessment:   73 y.o. 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  · F/U  Blemarianela  · Monitor  · Avoid nephrotoxic meds  · Treat amyloidosis and lupus         Relevant Orders    Ambulatory referral to Outpatient Case Management       Endocrine    Malnutrition of moderate degree     Low BMI, poor PO intake, uncontrolled chronic diseases  · Advised improved nutrition  · Low salt diet  · Refer to OPCM for assistance with nepro         Relevant Orders    " Ambulatory referral to Outpatient Case Management          Health Maintenance       Date Due Completion Date    Shingles Vaccine (2 of 3) 01/09/2012 11/14/2011    Influenza Vaccine (1) 09/01/2019 9/12/2018    Mammogram 09/11/2019 9/11/2018    High Dose Statin 08/27/2020 8/27/2019    DEXA SCAN 01/08/2022 1/8/2019    Colonoscopy 09/14/2022 9/14/2017    Lipid Panel 08/28/2023 8/28/2018    TETANUS VACCINE 06/29/2026 6/29/2016          Follow up in about 2 months (around 11/9/2019). Total face-to-face time was 60 min, 50% of this was spent on counseling and coordination of care. The following issues were discussed: with amyloidosis, HTN, CKD, debility, CHF    Amarilys Johns MD/MPH  Internal Medicine  [facility]  [phone]

## 2019-09-11 ENCOUNTER — OUTPATIENT CASE MANAGEMENT (OUTPATIENT)
Dept: ADMINISTRATIVE | Facility: OTHER | Age: 74
End: 2019-09-11

## 2019-09-11 ENCOUNTER — PATIENT MESSAGE (OUTPATIENT)
Dept: PRIMARY CARE CLINIC | Facility: CLINIC | Age: 74
End: 2019-09-11

## 2019-09-11 NOTE — PROGRESS NOTES
"9/11/19  Summary:  Incoming call from pt. Pt reports receipt of mailed [employer] coupons but denies receipt of education material on CKD diet. Pt says she feels the need to work with a Dietitian/Nutritionist to get a clear idea of what she can eat ie menus she can follow. Right now, pt states she doesn't know what she can eat. Pt is also lactose intolerant eliminating the area of dairy which is recommended limited anyway.  Pt says she saw Dr. Johns this week and it was mention that pt can apply for the lift transportation. Pt asks questions about the application process for the lift. Pt states she agrees to signing up with the [employer] [facility] for additional nutrition resources.       Interventions:  Provided pt with the [facility] Transportation Contact number and Reservation detail information.   Educated pt in the [employer]TA Lift transportation in [facility], application process and that this RN CM would f/u with [facility].   Educated pt on eating limited protein, limited potassium and limited sodium in general with CDK diet but that pt would benefit from speaking with Nutritionist to individualize menu ideas for pt. Pt agrees/requests to meet with Nutritionist-- last saw Nutritionist 11/9/18.   Spoke directly with Adelina Lucio, [facility] who will put this pt as priority to speak with.   Email sent to Jose Fields with [employer] and voice message left to inquire whether [place] with [employer] has an equivalent program such as the "Ensure Healthy Matters Enrollment" for long run assistance/resource for pt.   Messaged Justyna Stevenson, Nutritionist at [facility]-- to schedule appt and provide pt specific needs.     Plan:  F/u on Nutrition appt.   F/u on [employer] resource.  Resources- f/u on [facility] assessment & resources, PAP. Pending 9/6  CKD/ANEMIA- f/u on mailing receipt, provide education on renal diet and anemia management---remailed 9/6.   CHF- f/u on mailing receipt, monitor wts and response to meds, s/s complications/action " plan      Todays OPCM Self-Management Care Plan was developed with the patients/caregivers input and was based on identified barriers from todays assessment.  Goals were written today with the patient/caregiver and the patient has agreed to work towards these goals to improve his/her overall well-being. Patient verbalized understanding of the care plan, goals, and all of today's instructions. Encouraged patient/caregiver to communicate with his/her physician and health care team about health conditions and the treatment plan.  Provided my contact information today and encouraged patient/caregiver to call me with any questions as needed.

## 2019-09-12 NOTE — PROGRESS NOTES
Adherence packed for 28 days using Dispill:     Morning card:   Aspirin 81 mg   Carvedilol 25 mg   Citalopram 20 mg (1&1/2 tabs)   Famotidine 20 mg   Levothyroxine 75 mcg   Nifedipine ER Osmotic 60 mg   Sodium Bicarb 650 mg   Vitamin D 96219 IU (Once weekly in the Saturday pocket)     Evening card:   Atorvastatin 80 mg   Carvedilol 25 mg   Nifedipine ER Osmotic 60 mg   Sodium Bicarb 650 mg     PRN:   Doxazosin 2 mg (given in a separate bottle)

## 2019-09-16 ENCOUNTER — PATIENT MESSAGE (OUTPATIENT)
Dept: ADMINISTRATIVE | Facility: OTHER | Age: 74
End: 2019-09-16

## 2019-09-16 ENCOUNTER — OUTPATIENT CASE MANAGEMENT (OUTPATIENT)
Dept: ADMINISTRATIVE | Facility: OTHER | Age: 74
End: 2019-09-16

## 2019-09-16 NOTE — PROGRESS NOTES
9/16/19   Summary:  F/u with pt to update care plan. Pt confirms receipt of mailings on Sat 9/14, has not yet read. Informed pt of the scheduling number to schedule her Nutrition appt with existing referral in [facility]. Pt denies hearing from [facility] LMSW just yet. Assured pt that SW contact is pending.    Pt agrees to continued contact by this RN ELIAS weekly.     Interventions:  Care coordination for Nutrition appt for CKD meal planning.    Informed pt that information being sent via Patient Portal provided pt with the [employer] Website:  [email or web address]  And provided the RTA Lift contact phone # that pt requested (and RN ELIAS could not locate during phone encounter).   Initiated RTA Lift Application with pt to start process for pt.    Plan:  F/u that Nutrition appt is scheduled.    F/u on [employer] resources specific long-term for NEPRO.   Resources- f/u on [facility] LCSW assessment & resources, PAP. Pending 9/6  CKD/ANEMIA-  provide education on renal diet and anemia management---remailed 9/6, received 9/16. .   CHF- monitor wts and response to meds, s/s complications/action plan--received 9/16.       [facility] Self-Management Care Plan was developed with the patients/caregivers input and was based on identified barriers from todays assessment.  Goals were written today with the patient/caregiver and the patient has agreed to work towards these goals to improve his/her overall well-being. Patient verbalized understanding of the care plan, goals, and all of today's instructions. Encouraged patient/caregiver to communicate with his/her physician and health care team about health conditions and the treatment plan.  Provided my contact information today and encouraged patient/caregiver to call me with any questions as needed.

## 2019-09-17 ENCOUNTER — OUTPATIENT CASE MANAGEMENT (OUTPATIENT)
Dept: ADMINISTRATIVE | Facility: OTHER | Age: 74
End: 2019-09-17

## 2019-09-18 ENCOUNTER — OFFICE VISIT (OUTPATIENT)
Dept: TRANSPLANT | Facility: CLINIC | Age: 74
End: 2019-09-18
Payer: MEDICARE

## 2019-09-18 ENCOUNTER — HOSPITAL ENCOUNTER (OUTPATIENT)
Dept: RADIOLOGY | Facility: HOSPITAL | Age: 74
Discharge: HOME | End: 2019-09-18
Attending: INTERNAL MEDICINE
Payer: MEDICARE

## 2019-09-18 ENCOUNTER — HOSPITAL ENCOUNTER (OUTPATIENT)
Dept: CARDIOLOGY | Facility: CLINIC | Age: 74
Discharge: HOME | End: 2019-09-18
Attending: INTERNAL MEDICINE
Payer: MEDICARE

## 2019-09-18 VITALS
HEIGHT: 64 IN | DIASTOLIC BLOOD PRESSURE: 60 MMHG | WEIGHT: 116 LBS | SYSTOLIC BLOOD PRESSURE: 125 MMHG | BODY MASS INDEX: 19.81 KG/M2 | HEART RATE: 70 BPM

## 2019-09-18 VITALS
HEIGHT: 65 IN | BODY MASS INDEX: 19.5 KG/M2 | HEART RATE: 58 BPM | SYSTOLIC BLOOD PRESSURE: 144 MMHG | WEIGHT: 117.06 LBS | DIASTOLIC BLOOD PRESSURE: 65 MMHG

## 2019-09-18 DIAGNOSIS — I50.31 ACUTE HEART FAILURE WITH NORMAL EJECTION FRACTION: Primary | ICD-10-CM

## 2019-09-18 DIAGNOSIS — I50.32 CHRONIC DIASTOLIC HEART FAILURE: ICD-10-CM

## 2019-09-18 DIAGNOSIS — I35.0 AORTIC STENOSIS, MODERATE: ICD-10-CM

## 2019-09-18 DIAGNOSIS — I13.0 CARDIORENAL SYNDROME, STAGE 1-4 OR UNSPECIFIED CHRONIC KIDNEY DISEASE, WITH HEART FAILURE: ICD-10-CM

## 2019-09-18 DIAGNOSIS — Z12.39 BREAST CANCER SCREENING: ICD-10-CM

## 2019-09-18 DIAGNOSIS — I1A.0 RESISTANT HYPERTENSION: ICD-10-CM

## 2019-09-18 DIAGNOSIS — I70.0 AORTIC ATHEROSCLEROSIS: ICD-10-CM

## 2019-09-18 DIAGNOSIS — N18.4 CKD (CHRONIC KIDNEY DISEASE), STAGE IV: ICD-10-CM

## 2019-09-18 LAB
ASCENDING AORTA: 2.69 CM
AV INDEX (PROSTH): 0.25
AV MEAN GRADIENT: 37 MMHG
AV PEAK GRADIENT: 67 MMHG
AV VALVE AREA: 0.93 CM2
AV VELOCITY RATIO: 0.25
BSA FOR ECHO PROCEDURE: 1.54 M2
CV ECHO LV RWT: 0.76 CM
DOP CALC AO PEAK VEL: 4.1 M/S
DOP CALC AO VTI: 98.3 CM
DOP CALC LVOT AREA: 3.8 CM2
DOP CALC LVOT DIAMETER: 2.2 CM
DOP CALC LVOT PEAK VEL: 1.04 M/S
DOP CALC LVOT STROKE VOLUME: 91.76 CM3
DOP CALCLVOT PEAK VEL VTI: 24.15 CM
E WAVE DECELERATION TIME: 171.89 MSEC
E/A RATIO: 1.5
E/E' RATIO: 18.5 M/S
ECHO LV POSTERIOR WALL: 1.65 CM (ref 0.6–1.1)
FRACTIONAL SHORTENING: 35 % (ref 28–44)
INTERVENTRICULAR SEPTUM: 1.59 CM (ref 0.6–1.1)
IVRT: 0.04 MSEC
LA MAJOR: 6.68 CM
LA MINOR: 6.51 CM
LA WIDTH: 4.97 CM
LEFT ATRIUM SIZE: 5.49 CM
LEFT ATRIUM VOLUME INDEX: 98.6 ML/M2
LEFT ATRIUM VOLUME: 152.93 CM3
LEFT INTERNAL DIMENSION IN SYSTOLE: 2.8 CM (ref 2.1–4)
LEFT VENTRICLE DIASTOLIC VOLUME INDEX: 54.27 ML/M2
LEFT VENTRICLE DIASTOLIC VOLUME: 84.22 ML
LEFT VENTRICLE MASS INDEX: 189 G/M2
LEFT VENTRICLE SYSTOLIC VOLUME INDEX: 19 ML/M2
LEFT VENTRICLE SYSTOLIC VOLUME: 29.55 ML
LEFT VENTRICULAR INTERNAL DIMENSION IN DIASTOLE: 4.33 CM (ref 3.5–6)
LEFT VENTRICULAR MASS: 293.98 G
LV LATERAL E/E' RATIO: 15.86 M/S
LV SEPTAL E/E' RATIO: 22.2 M/S
MV PEAK A VEL: 0.74 M/S
MV PEAK E VEL: 1.11 M/S
PISA TR MAX VEL: 3.16 M/S
PULM VEIN S/D RATIO: 0.65
PV PEAK D VEL: 0.51 M/S
PV PEAK S VEL: 0.33 M/S
RA MAJOR: 4.96 CM
RA PRESSURE: 8 MMHG
RA WIDTH: 4.09 CM
RIGHT VENTRICULAR END-DIASTOLIC DIMENSION: 4.07 CM
RV TISSUE DOPPLER FREE WALL SYSTOLIC VELOCITY 1 (APICAL 4 CHAMBER VIEW): 11.72 CM/S
SINUS: 2.85 CM
STJ: 2.26 CM
TDI LATERAL: 0.07 M/S
TDI SEPTAL: 0.05 M/S
TDI: 0.06 M/S
TR MAX PG: 40 MMHG
TRICUSPID ANNULAR PLANE SYSTOLIC EXCURSION: 1.46 CM
TV REST PULMONARY ARTERY PRESSURE: 48 MMHG

## 2019-09-18 PROCEDURE — 3074F PR MOST RECENT SYSTOLIC BLOOD PRESSURE < 130 MM HG: ICD-10-PCS | Mod: HCNC,CPTII,S$GLB, | Performed by: INTERNAL MEDICINE

## 2019-09-18 PROCEDURE — 77067 SCR MAMMO BI INCL CAD: CPT | Mod: TC,HCNC

## 2019-09-18 PROCEDURE — 93306 TRANSTHORACIC ECHO (TTE) COMPLETE (CUPID ONLY): ICD-10-PCS | Mod: 26,HCNC,, | Performed by: INTERNAL MEDICINE

## 2019-09-18 PROCEDURE — 93306 TTE W/DOPPLER COMPLETE: CPT | Mod: 26,HCNC,, | Performed by: INTERNAL MEDICINE

## 2019-09-18 PROCEDURE — 1101F PT FALLS ASSESS-DOCD LE1/YR: CPT | Mod: HCNC,CPTII,S$GLB, | Performed by: INTERNAL MEDICINE

## 2019-09-18 PROCEDURE — 99999 PR PBB SHADOW E&M-EST. PATIENT-LVL V: ICD-10-PCS | Mod: PBBFAC,HCNC,, | Performed by: INTERNAL MEDICINE

## 2019-09-18 PROCEDURE — 93306 TTE W/DOPPLER COMPLETE: CPT | Mod: HCNC

## 2019-09-18 PROCEDURE — 99215 PR OFFICE/OUTPT VISIT, EST, LEVL V, 40-54 MIN: ICD-10-PCS | Mod: HCNC,S$GLB,, | Performed by: INTERNAL MEDICINE

## 2019-09-18 PROCEDURE — 1101F PR PT FALLS ASSESS DOC 0-1 FALLS W/OUT INJ PAST YR: ICD-10-PCS | Mod: HCNC,CPTII,S$GLB, | Performed by: INTERNAL MEDICINE

## 2019-09-18 PROCEDURE — 77063 BREAST TOMOSYNTHESIS BI: CPT | Mod: 26,HCNC,, | Performed by: RADIOLOGY

## 2019-09-18 PROCEDURE — 77067 MAMMO DIGITAL SCREENING BILAT WITH TOMOSYNTHESIS_CAD: ICD-10-PCS | Mod: 26,HCNC,, | Performed by: RADIOLOGY

## 2019-09-18 PROCEDURE — 3074F SYST BP LT 130 MM HG: CPT | Mod: HCNC,CPTII,S$GLB, | Performed by: INTERNAL MEDICINE

## 2019-09-18 PROCEDURE — 99999 PR PBB SHADOW E&M-EST. PATIENT-LVL V: CPT | Mod: PBBFAC,HCNC,, | Performed by: INTERNAL MEDICINE

## 2019-09-18 PROCEDURE — 77067 SCR MAMMO BI INCL CAD: CPT | Mod: 26,HCNC,, | Performed by: RADIOLOGY

## 2019-09-18 PROCEDURE — 3078F DIAST BP <80 MM HG: CPT | Mod: HCNC,CPTII,S$GLB, | Performed by: INTERNAL MEDICINE

## 2019-09-18 PROCEDURE — 77063 MAMMO DIGITAL SCREENING BILAT WITH TOMOSYNTHESIS_CAD: ICD-10-PCS | Mod: 26,HCNC,, | Performed by: RADIOLOGY

## 2019-09-18 PROCEDURE — 3078F PR MOST RECENT DIASTOLIC BLOOD PRESSURE < 80 MM HG: ICD-10-PCS | Mod: HCNC,CPTII,S$GLB, | Performed by: INTERNAL MEDICINE

## 2019-09-18 PROCEDURE — 99215 OFFICE O/P EST HI 40 MIN: CPT | Mod: HCNC,S$GLB,, | Performed by: INTERNAL MEDICINE

## 2019-09-18 NOTE — ASSESSMENT & PLAN NOTE
Stable with mean gradient < 40 PABLITO around 1 based on echo from today  Repeat in March 2020 (after > six months of amyloid treatment)

## 2019-09-18 NOTE — PATIENT INSTRUCTIONS
Echo unchanged to minimally better in terms of mitral regurg and aortic stenosis  Will get Six minute walk in Dec 2018 Needs to start walking

## 2019-09-18 NOTE — ASSESSMENT & PLAN NOTE
Not clear to me how much each of the following are playing in her SOB (1) CKD 4 (2) Mod to severe Aortic stenosis (3)  Wlid type TTR   Would expect improvement in six minute walk 6 to 12 months after started tamafidis for amyloid treatment   Aortic stenosis / creatinine may simply stabilize which is succesful outcome

## 2019-09-18 NOTE — PROGRESS NOTES
"Subjective:    Patient ID:  Ewelina Arnold is a 73 y.o. female who presents for follow-up of Congestive Heart Failure      HPI  73-year-old female aortic stenosis, CHFpEF, HBP and is undergoing treatment for cardiac amyloidosis with tafamidis since Aug 2019.  Reports no side effects from tafamidis  No real change in class 3 symptoms. No edema  She attempts to remain active but reports that she had to give up exercising. She sleeps on 2 pillows no definite PND spells  07/25/2019 six minute walk  121.92 meters (400 feet)  Echo Sept 2019   · Concentric left ventricular hypertrophy.  · Normal left ventricular systolic function. The estimated ejection fraction is 65%  · Normal right ventricular systolic function.  · Grade II (moderate) left ventricular diastolic dysfunction consistent with pseudonormalization.  · Severe left atrial enlargement.  · Mild right atrial enlargement.  · Moderate-to-severe aortic valve stenosis. Aortic valve area is 0.93 cm2; peak velocity is 4.1 m/s; mean gradient is 37 mmHg.  · Mild mitral regurgitation.  · Mild tricuspid regurgitation.  · Small pericardial effusion.  · There is a small right pleural effusion.  · The estimated PA systolic pressure is 48 mm Hg  · Intermediate central venous pressure (8 mm Hg).  Review of Systems   Constitution: Negative for decreased appetite, weight gain and weight loss.   Cardiovascular: Negative for chest pain, dyspnea on exertion, leg swelling, near-syncope, orthopnea and palpitations.   Respiratory: Negative for cough and shortness of breath.    Musculoskeletal: Negative for myalgias.   Gastrointestinal: Negative for jaundice.        Objective:    Physical Exam   Constitutional: She appears well-developed and well-nourished. No distress.   BP (!) 144/65 (BP Location: Right arm, Patient Position: Sitting, BP Method: Medium (Automatic))   Pulse (!) 58   Ht 5' 4.5" (1.638 m)   Wt 53.1 kg (117 lb 1 oz)   LMP  (LMP Unknown) Comment: 99  BMI 19.78 kg/m²    "   HENT:   Head: Normocephalic and atraumatic. Head is without abrasion and without contusion.   Right Ear: External ear normal.   Left Ear: External ear normal.   Nose: Nose normal. No epistaxis.   Mouth/Throat: Oropharynx is clear and moist. Mucous membranes are not cyanotic.   Eyes: Pupils are equal, round, and reactive to light. Conjunctivae and EOM are normal.   Neck: Normal range of motion. Neck supple. No tracheal deviation present.   Cardiovascular: Normal rate, regular rhythm and normal pulses. Exam reveals no gallop.   Murmur heard.   Harsh midsystolic murmur is present with a grade of 3/6 at the upper right sternal border radiating to the neck.  Pulmonary/Chest: Effort normal and breath sounds normal. No stridor. No respiratory distress. She has no wheezes.   Abdominal: Soft. Normal appearance, normal aorta and bowel sounds are normal. She exhibits no distension. There is no tenderness.   Musculoskeletal: She exhibits no edema or tenderness.   Neurological: She is alert. She has normal strength and normal reflexes. She exhibits normal muscle tone.   Skin: Skin is warm. No rash noted. No erythema.   Psychiatric: She has a normal mood and affect. Her speech is normal and behavior is normal. Judgment and thought content normal. Cognition and memory are normal.     Lab Results   Component Value Date    BNP 2,296 (H) 09/18/2019     09/18/2019    K 4.2 09/18/2019    MG 1.6 10/17/2018     09/18/2019    CO2 26 09/18/2019    BUN 65 (H) 09/18/2019    CREATININE 3.5 (H) 09/18/2019    GLU 76 09/18/2019    HGBA1C 5.2 09/25/2018    AST 25 08/02/2019    ALT 9 (L) 08/02/2019    ALBUMIN 3.2 (L) 09/18/2019    PROT 7.7 08/02/2019    BILITOT 0.3 08/02/2019    CHOL 181 08/28/2018    HDL 66 08/28/2018    LDLCALC 104.2 08/28/2018    TRIG 54 08/28/2018           BNP   Date Value Ref Range Status   09/18/2019 2,296 (H) 0 - 99 pg/mL Final     Comment:     Values of less than 100 pg/ml are consistent with non-CHF  populations.   06/06/2019 2,654 (H) 0 - 99 pg/mL Final     Comment:     Values of less than 100 pg/ml are consistent with non-CHF populations.   05/10/2019 2,539 (H) 0 - 99 pg/mL Final     Comment:     Values of less than 100 pg/ml are consistent with non-CHF populations.               Assessment:       1. Acute heart failure with normal ejection fraction    2. Aortic stenosis, moderate    3. Aortic atherosclerosis    4. Cardiorenal syndrome, stage 1-4 or unspecified chronic kidney disease, with heart failure    5. CKD (chronic kidney disease), stage IV    6. Chronic diastolic heart failure         Plan:       Problem List Items Addressed This Visit     Aortic atherosclerosis    Current Assessment & Plan     Continue lipitor 80 mg daily          CKD (chronic kidney disease), stage IV    Overview     Donated kidney to her brother  1/23/2017 patient continues to follow-up with nephrology awaiting renal panel will contact patient results available.  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  · F/U  [place]  · Monitor  · Avoid nephrotoxic meds  · Treat amyloidosis and lupus         Aortic stenosis, moderate    Current Assessment & Plan     Stable with mean gradient < 40 PABLITO around 1 based on echo from today  Repeat in March 2020 (after > six months of amyloid treatment)         Relevant Orders    Stress test, pulmonary    Brain natriuretic peptide    Comprehensive metabolic panel    Chronic diastolic heart failure    Overview     Grade 2 diastolic failure, systolic HF, aortic stenosis         Current Assessment & Plan     Not clear to me how much each of the following are playing in her SOB (1) CKD 4 (2) Mod to severe Aortic stenosis (3)  Wlid type TTR   Would expect improvement in six minute walk 6 to 12 months after started tamafidis for amyloid treatment   Aortic stenosis / creatinine may simply stabilize which is succesful outcome         Acute heart failure  with normal ejection fraction - Primary    Relevant Orders    Stress test, pulmonary    Brain natriuretic peptide    Comprehensive metabolic panel    Cardiorenal syndrome, stage 1-4 or unspecified chronic kidney disease, with heart failure    Overview     Patient with worsening kidney function in the setting of decompensated CHF and aortic stenosis         Current Assessment & Plan     · Seems to be stablizing of late             Follow up in about 4 months (around 1/18/2020).  Orders Placed This Encounter   Procedures    Brain natriuretic peptide    Comprehensive metabolic panel    Stress test, pulmonary

## 2019-09-18 NOTE — PROGRESS NOTES
Summary:  [facility] received a referral from OPC RN, Eulalia ERWIN, to complete SW assessment, assist with obtaining a wheelchair ramp for her home, RTA List application, financial assistance (outstanding ambulance bill), support and advance care planning. Pts PCP is Amarilys Johns MD. Pt is inured by [employer] Medicare HMO. [facility] completed assessment with pt telephonically. Pt is . Pt is a retired . Pt reports that she lives alone in [place], [place]. Pt reports that she performs ADLs independently. Pt does not drive. [facility] provided education regarding the Human transportation benefit. [facility] lift. Pt reports that her support system consists of her son and daughter. Pt reports that she does not have a medical POA or living will. Pt is interested in receiving the [facility] Advance Care Planning packet. [facility] will provide the [facility] Advance Care Planning packet. Pt reports that she served in the [employer] between [id number]. Pt is interested in the [employer] program. [facility] will refer pt to [employer]et[employer] via online portal. Pt reports that her disaster plan is to evacuate with her daughter in a personal vehicle. [facility] will provide information regarding the [facility] emergency evacuation program so that she can use it as a back-up plan. Pt has a psychiatric dx of major depressive disorder. Pt denies all addictive behaviors. Pt denies SI/HI. Follow up in two weeks. Pt is in agreement with follow up call.     Interventions:  Chart review  Completed social work assessment, PHQ9 and plan of care  Collaborated with [facility] [place] [facility] RN   Provided [facility] contact information  Facilitated referral for RTA lift services.   Educated and provided the [facility] Advance Care Planning packet  Educated pt about [employer] insurance benefits  Educated and provided [employer] OTC catalog.  Educated and provided information about the [employer] transportation benefit.   Educated and  referred to [place] (via online portal)    Plan:  Follow up in two weeks. Pt is in agreement with follow up call  Re-assess for further needs and answer any questions that might arise  Confirm receipt of mailed resources and educate      Todays [facility] Self-Management Care Plan was developed with the patients/caregivers input and was based on identified barriers from todays assessment.  Goals were written today with the patient/caregiver and the patient has agreed to work towards these goals to improve his/her overall well-being. Patient verbalized understanding of the care plan, goals, and all of today's instructions. Encouraged patient/caregiver to communicate with his/her physician and health care team about health conditions and the treatment plan.  Provided my contact information today and encouraged patient/caregiver to call me with any questions as needed.

## 2019-09-19 ENCOUNTER — TELEPHONE (OUTPATIENT)
Dept: PRIMARY CARE CLINIC | Facility: CLINIC | Age: 74
End: 2019-09-19

## 2019-09-19 ENCOUNTER — TELEPHONE (OUTPATIENT)
Dept: PHARMACY | Facility: CLINIC | Age: 74
End: 2019-09-19

## 2019-09-19 DIAGNOSIS — I51.89 LEFT VENTRICULAR DIASTOLIC DYSFUNCTION WITH PRESERVED SYSTOLIC FUNCTION: ICD-10-CM

## 2019-09-19 DIAGNOSIS — I25.10 CORONARY ARTERY DISEASE DUE TO CALCIFIED CORONARY LESION: ICD-10-CM

## 2019-09-19 DIAGNOSIS — I43 CARDIAC AMYLOIDOSIS: ICD-10-CM

## 2019-09-19 DIAGNOSIS — I50.32 CHRONIC DIASTOLIC CONGESTIVE HEART FAILURE: ICD-10-CM

## 2019-09-19 DIAGNOSIS — I13.0 CARDIORENAL SYNDROME, STAGE 1-4 OR UNSPECIFIED CHRONIC KIDNEY DISEASE, WITH HEART FAILURE: Primary | ICD-10-CM

## 2019-09-19 DIAGNOSIS — M32.19 OTHER SYSTEMIC LUPUS ERYTHEMATOSUS WITH OTHER ORGAN INVOLVEMENT: Chronic | ICD-10-CM

## 2019-09-19 DIAGNOSIS — I50.1 PULMONARY EDEMA CARDIAC CAUSE: ICD-10-CM

## 2019-09-19 DIAGNOSIS — I50.33 ACUTE ON CHRONIC DIASTOLIC CONGESTIVE HEART FAILURE: ICD-10-CM

## 2019-09-19 DIAGNOSIS — E85.4 CARDIAC AMYLOIDOSIS: ICD-10-CM

## 2019-09-19 DIAGNOSIS — I25.84 CORONARY ARTERY DISEASE DUE TO CALCIFIED CORONARY LESION: ICD-10-CM

## 2019-09-19 NOTE — TELEPHONE ENCOUNTER
Please let her know we will work on finding her transportation to her cardiac rehab.    Adelina- can you help me with finding her transportation to her cardiac rehab? OPCW referral placed.    Thanks,  KJ

## 2019-09-19 NOTE — TELEPHONE ENCOUNTER
Please see if patient is willing to restart cardiac rehab at [facility]. Her Cardiologist wants her to do it because it may improve her cardiac function. Does she want to do this?    Thanks,  KJ    Notes recorded by Yeimi Burdick MD on 9/19/2019 at 10:23 AM CDT  Yes please enroll her  ------    Notes recorded by Yeimi Burdick MD on 9/18/2019 at 2:47 PM CDT  Echo unchanged to minimally better in terms of mitral regurg and aortic stenosis  Will get Six minute walk in Dec 2018 Needs to start walking

## 2019-09-20 ENCOUNTER — TELEPHONE (OUTPATIENT)
Dept: PRIMARY CARE CLINIC | Facility: CLINIC | Age: 74
End: 2019-09-20

## 2019-09-20 RX ORDER — AZATHIOPRINE 50 MG/1
50 TABLET ORAL DAILY
Qty: 30 TABLET | Refills: 2 | Status: SHIPPED | OUTPATIENT
Start: 2019-09-20 | End: 2020-01-03 | Stop reason: SDUPTHER

## 2019-09-22 ENCOUNTER — TELEPHONE (OUTPATIENT)
Dept: PRIMARY CARE CLINIC | Facility: CLINIC | Age: 74
End: 2019-09-22

## 2019-09-22 NOTE — TELEPHONE ENCOUNTER
"Please advise her that her Cardiologist wants her to start an exercise program for her heart health. This is very important and will make her heart work better.    She will need to work with us on where she prefers to go. Does she need any assistance getting plugged into [employer]?     Thanks,  HENNY Buitrago MD   Cc: VENTURA Canseco Staff   Caller: Unspecified (3 days ago, 12:45 PM)             Good afternoon,   I spoke with Dora at [employer] and she reports that the pt has nine round trip rides available. Eulalia Lynch, FLACA and I also completed the [employer] application and the application was mailed. I followed up with pt to discuss Cardiac rehab and she reports that "It was ok at first but I am not sure if I want to go back. I would rather the class at [employer] that is 2x per week. I might go. I don't know. I will think about it. I am not ready to decide yet." OPCM LMSW also discussed Meals on Wheels with pt. Pt reports "I don't want that." OPCM RN provided pt with the [employer] application to receive Nepro at a discounted price. I will follow up.     Adelina Lucio        "

## 2019-09-24 ENCOUNTER — INFUSION (OUTPATIENT)
Dept: INFECTIOUS DISEASES | Facility: HOSPITAL | Age: 74
End: 2019-09-24
Attending: INTERNAL MEDICINE
Payer: MEDICARE

## 2019-09-24 ENCOUNTER — OUTPATIENT CASE MANAGEMENT (OUTPATIENT)
Dept: ADMINISTRATIVE | Facility: OTHER | Age: 74
End: 2019-09-24

## 2019-09-24 VITALS
SYSTOLIC BLOOD PRESSURE: 159 MMHG | HEIGHT: 64 IN | DIASTOLIC BLOOD PRESSURE: 67 MMHG | BODY MASS INDEX: 19.57 KG/M2 | WEIGHT: 114.63 LBS

## 2019-09-24 DIAGNOSIS — D63.1 ANEMIA OF CHRONIC RENAL FAILURE, STAGE 4 (SEVERE): Primary | ICD-10-CM

## 2019-09-24 DIAGNOSIS — N18.4 ANEMIA OF CHRONIC RENAL FAILURE, STAGE 4 (SEVERE): Primary | ICD-10-CM

## 2019-09-24 PROCEDURE — 90662 FLU VACCINE - HIGH DOSE (65+) PRESERVATIVE FREE IM: ICD-10-PCS | Mod: HCNC,,, | Performed by: INTERNAL MEDICINE

## 2019-09-24 PROCEDURE — 90662 IIV NO PRSV INCREASED AG IM: CPT | Mod: HCNC,,, | Performed by: INTERNAL MEDICINE

## 2019-09-24 PROCEDURE — 96372 THER/PROPH/DIAG INJ SC/IM: CPT

## 2019-09-24 PROCEDURE — G0008 ADMIN INFLUENZA VIRUS VAC: HCPCS | Mod: HCNC,,, | Performed by: INTERNAL MEDICINE

## 2019-09-24 PROCEDURE — G0008 FLU VACCINE - HIGH DOSE (65+) PRESERVATIVE FREE IM: ICD-10-PCS | Mod: HCNC,,, | Performed by: INTERNAL MEDICINE

## 2019-09-24 PROCEDURE — 63600175 PHARM REV CODE 636 W HCPCS: Mod: JG,HCNC | Performed by: INTERNAL MEDICINE

## 2019-09-24 RX ADMIN — DARBEPOETIN ALFA 100 MCG: 100 INJECTION, SOLUTION INTRAVENOUS; SUBCUTANEOUS at 08:09

## 2019-09-24 NOTE — PROGRESS NOTES
Hgb 10.6;Hct 34.1    No dose change  PER PROTOCOL FORM DROD-188. Aranesp 100 mcg given and 2 week return appt set.      Gfr 14.2/stage 5

## 2019-09-24 NOTE — PROGRESS NOTES
9/24/19  Summary:  Incoming voice message from pt requesting the phone number for [facility] LMSW familiar with pt. The reason is not known. Provided pt with clinic contact number- which is the number she was calling without success. Sent email to [facility] LMSW to advise her of pt's request.   Pt reports doing fairly well.   Pt confirms receipt of [employer] email address for Nepro recipes ideas- hasn't read over and receipt of information on connecting with [employer] Nepro supplement resources. Nutrition appt not scheduled. Pt reports having a lot of things she needs to get around to do. S/p Aranesp inj & Flu Vaccine today.     Interventions:  Care Coordination.   Provided pt with the Central Scheduling Desk ph # to schedule Nutrition appt.     Plan:  F/u that Nutrition appt is scheduled.  Pending. Pt has not scheduled.   F/u on [employer] resources specific long-term for NEPRO Done 9/24    CKD/ANEMIA-  provide education on renal diet and anemia management-.   CHF- monitor wts and response to meds, s/s complications/action plan.        [facility] Self-[facility] Care Plan was developed with the patients/caregivers input and was based on identified barriers from todays assessment.  Goals were written today with the patient/caregiver and the patient has agreed to work towards these goals to improve his/her overall well-being. Patient verbalized understanding of the care plan, goals, and all of today's instructions. Encouraged patient/caregiver to communicate with his/her physician and health care team about health conditions and the treatment plan.  Provided my contact information today and encouraged patient/caregiver to call me with any questions as needed.

## 2019-09-25 ENCOUNTER — OUTPATIENT CASE MANAGEMENT (OUTPATIENT)
Dept: ADMINISTRATIVE | Facility: OTHER | Age: 74
End: 2019-09-25

## 2019-09-25 ENCOUNTER — TELEPHONE (OUTPATIENT)
Dept: PRIMARY CARE CLINIC | Facility: CLINIC | Age: 74
End: 2019-09-25

## 2019-09-27 ENCOUNTER — TELEPHONE (OUTPATIENT)
Dept: PHARMACY | Facility: CLINIC | Age: 74
End: 2019-09-27

## 2019-10-02 DIAGNOSIS — I25.10 CORONARY ARTERY DISEASE DUE TO CALCIFIED CORONARY LESION: ICD-10-CM

## 2019-10-02 DIAGNOSIS — I50.31 ACUTE HEART FAILURE WITH NORMAL EJECTION FRACTION: ICD-10-CM

## 2019-10-02 DIAGNOSIS — I25.84 CORONARY ARTERY DISEASE DUE TO CALCIFIED CORONARY LESION: ICD-10-CM

## 2019-10-03 RX ORDER — SODIUM BICARBONATE 650 MG/1
TABLET ORAL
Qty: 180 TABLET | Refills: 3 | Status: SHIPPED | OUTPATIENT
Start: 2019-10-03 | End: 2020-02-21 | Stop reason: SDUPTHER

## 2019-10-04 ENCOUNTER — LAB VISIT (OUTPATIENT)
Dept: LAB | Facility: HOSPITAL | Age: 74
End: 2019-10-04
Attending: INTERNAL MEDICINE
Payer: MEDICARE

## 2019-10-04 DIAGNOSIS — D63.1 ANEMIA OF CHRONIC RENAL FAILURE, STAGE 4 (SEVERE): ICD-10-CM

## 2019-10-04 DIAGNOSIS — N18.4 ANEMIA OF CHRONIC RENAL FAILURE, STAGE 4 (SEVERE): ICD-10-CM

## 2019-10-04 LAB
HCT VFR BLD AUTO: 35.8 % (ref 37–48.5)
HGB BLD-MCNC: 10.5 G/DL (ref 12–16)
IRON SERPL-MCNC: 38 UG/DL (ref 30–160)
SATURATED IRON: 15 % (ref 20–50)
TOTAL IRON BINDING CAPACITY: 258 UG/DL (ref 250–450)
TRANSFERRIN SERPL-MCNC: 174 MG/DL (ref 200–375)

## 2019-10-04 PROCEDURE — 85014 HEMATOCRIT: CPT | Mod: HCNC

## 2019-10-04 PROCEDURE — 36415 COLL VENOUS BLD VENIPUNCTURE: CPT | Mod: HCNC,PO

## 2019-10-04 PROCEDURE — 83540 ASSAY OF IRON: CPT | Mod: HCNC

## 2019-10-04 PROCEDURE — 85018 HEMOGLOBIN: CPT | Mod: HCNC

## 2019-10-04 NOTE — PROGRESS NOTES
"Summary:  OPCM LMSW followed up with pt. Pt reports that she is feeling "well." Pt reports that she does not like staying in the house and that she is looking forward to using RTA lift. Pt lives in [place], [place] and asked LMSW when is the [employer] scheduled. LMSW informed pt that the [facility] is October 11-13, 2019. Pt reports that her daughter [place] [facility] when [place] is going [facility]. LMSW informed pt that the application for RTA lift was sent to the R[employer] office. Pt also reports that she spoke with an employee with [employer] and she does not meet criteria for their benefits. LMSW also educated pt about her [employer] benefits. LMSW also ptovided additional education about the [facility] Advance Care Planning placket. LMSW will follow up in two weeks. Pt is in agreement with follow up call.    Interventions:  Chart review  Collaborated with OPCM RN   Provided LMSW contact information  Informed pt of the status of the RTA lift application  Educated pt about the [facility] Advance Care Planning packet  Educated pt about [employer] insurance benefits  Educated and provided [employer] OTC catalog.  Educated and provided information about the [employer] transportation benefit.     Plan:  Follow up in two weeks. Pt is in agreement with follow up call  Re-assess for further needs and answer any questions that might arise    Todays [facility] Self-Management Care Plan was developed with the patients/caregivers input and was based on identified barriers from todays assessment.  Goals were written today with the patient/caregiver and the patient has agreed to work towards these goals to improve his/her overall well-being. Patient verbalized understanding of the care plan, goals, and all of today's instructions. Encouraged patient/caregiver to communicate with his/her physician and health care team about health conditions and the treatment plan.  Provided my contact information today and encouraged patient/caregiver to " call me with any questions as needed.

## 2019-10-09 ENCOUNTER — TELEPHONE (OUTPATIENT)
Dept: NEPHROLOGY | Facility: CLINIC | Age: 74
End: 2019-10-09

## 2019-10-09 NOTE — TELEPHONE ENCOUNTER
----- Message from Shana Ann sent at 10/9/2019  1:43 PM CDT -----  Contact:    Pt   [phone]  Pt  Has a questions in regards to her 10/25 appt with the Dr     Spoke to pt

## 2019-10-10 ENCOUNTER — INFUSION (OUTPATIENT)
Dept: INFECTIOUS DISEASES | Facility: HOSPITAL | Age: 74
End: 2019-10-10
Attending: INTERNAL MEDICINE
Payer: MEDICARE

## 2019-10-10 VITALS
HEIGHT: 64 IN | WEIGHT: 114.63 LBS | BODY MASS INDEX: 19.57 KG/M2 | DIASTOLIC BLOOD PRESSURE: 65 MMHG | SYSTOLIC BLOOD PRESSURE: 154 MMHG

## 2019-10-10 DIAGNOSIS — D63.1 ANEMIA IN STAGE 5 CHRONIC KIDNEY DISEASE: Primary | ICD-10-CM

## 2019-10-10 DIAGNOSIS — D63.1 ANEMIA OF CHRONIC RENAL FAILURE, STAGE 4 (SEVERE): ICD-10-CM

## 2019-10-10 DIAGNOSIS — N18.5 ANEMIA IN STAGE 5 CHRONIC KIDNEY DISEASE: Primary | ICD-10-CM

## 2019-10-10 DIAGNOSIS — N18.4 ANEMIA OF CHRONIC RENAL FAILURE, STAGE 4 (SEVERE): ICD-10-CM

## 2019-10-10 PROCEDURE — 96372 THER/PROPH/DIAG INJ SC/IM: CPT | Mod: HCNC

## 2019-10-10 PROCEDURE — 63600175 PHARM REV CODE 636 W HCPCS: Mod: JG,HCNC | Performed by: INTERNAL MEDICINE

## 2019-10-10 RX ADMIN — DARBEPOETIN ALFA 100 MCG: 100 INJECTION, SOLUTION INTRAVENOUS; SUBCUTANEOUS at 08:10

## 2019-10-10 NOTE — PROGRESS NOTES
Hgb 10.5;Hct 35.8    No dose change  PER PROTOCOL FORM DROD-188. Aranesp 100 mcg given and 2 week return appt set.     Gfr 14.2/stage 5

## 2019-10-14 NOTE — PROGRESS NOTES
Adherence packed for 28 days using Dispill:     Morning card:   Aspirin 81 mg   Carvedilol 25 mg   Citalopram 20 mg (1&1/2 tabs)   Famotidine 20 mg   Levothyroxine 75 mcg   Nifedipine ER Osmotic 60 mg   Sodium Bicarb 650 mg   Vitamin D 70410 IU (Once weekly)  Evening card:     Atorvastatin 80 mg   Carvedilol 25 mg   Nifedipine ER Osmotic 60 mg   Sodium Bicarb 650 mg     PRN:   Doxazosin 2 mg (given in a separate bottle)

## 2019-10-15 ENCOUNTER — TELEPHONE (OUTPATIENT)
Dept: OPHTHALMOLOGY | Facility: CLINIC | Age: 74
End: 2019-10-15

## 2019-10-15 NOTE — TELEPHONE ENCOUNTER
----- Message from Viky Harris MA sent at 10/14/2019  3:39 PM CDT -----  Contact: self      ----- Message -----  From: Shima Herrera  Sent: 10/14/2019   3:27 PM CDT  To: Rigo Clemente Staff    Patient need to speak with nurse   Patient need to reschedule appointment for 10/23/2019 at 8am or after 11am    10/25/169318:15am or after    Name of Caller:patient   When is the first available appointment?  Symptoms  Would the patient rather a call back or a response via [facility]? call  Best Call Back Number:[phone]  Additional Information:

## 2019-10-15 NOTE — TELEPHONE ENCOUNTER
Pt has apt with Dr. Sierra on 10/21/2019  Dr. Sierra staff will let Dr. Mccray know pt's IOP.   Pt may not need to see Dr. Mccray on 10/23/2019.

## 2019-10-21 ENCOUNTER — OUTPATIENT CASE MANAGEMENT (OUTPATIENT)
Dept: ADMINISTRATIVE | Facility: OTHER | Age: 74
End: 2019-10-21

## 2019-10-21 ENCOUNTER — CLINICAL SUPPORT (OUTPATIENT)
Dept: OPHTHALMOLOGY | Facility: CLINIC | Age: 74
End: 2019-10-21
Payer: MEDICARE

## 2019-10-21 ENCOUNTER — TELEPHONE (OUTPATIENT)
Dept: RHEUMATOLOGY | Facility: CLINIC | Age: 74
End: 2019-10-21

## 2019-10-21 ENCOUNTER — TELEPHONE (OUTPATIENT)
Dept: OPHTHALMOLOGY | Facility: CLINIC | Age: 74
End: 2019-10-21

## 2019-10-21 ENCOUNTER — OFFICE VISIT (OUTPATIENT)
Dept: OPHTHALMOLOGY | Facility: CLINIC | Age: 74
End: 2019-10-21
Payer: MEDICARE

## 2019-10-21 VITALS — HEART RATE: 65 BPM | SYSTOLIC BLOOD PRESSURE: 153 MMHG | DIASTOLIC BLOOD PRESSURE: 75 MMHG

## 2019-10-21 DIAGNOSIS — T37.2X5A TOXIC MACULOPATHY FROM PLAQUENIL IN THERAPEUTIC USE: ICD-10-CM

## 2019-10-21 DIAGNOSIS — H40.243 RESIDUAL STAGE OF ANGLE-CLOSURE GLAUCOMA OF BOTH EYES: ICD-10-CM

## 2019-10-21 DIAGNOSIS — M32.9 SYSTEMIC LUPUS ERYTHEMATOSUS, UNSPECIFIED SLE TYPE, UNSPECIFIED ORGAN INVOLVEMENT STATUS: ICD-10-CM

## 2019-10-21 DIAGNOSIS — T37.2X5A TOXIC MACULOPATHY FROM PLAQUENIL IN THERAPEUTIC USE: Primary | ICD-10-CM

## 2019-10-21 DIAGNOSIS — H25.13 NUCLEAR SCLEROSIS OF BOTH EYES: ICD-10-CM

## 2019-10-21 DIAGNOSIS — H35.389 TOXIC MACULOPATHY FROM PLAQUENIL IN THERAPEUTIC USE: ICD-10-CM

## 2019-10-21 DIAGNOSIS — H35.389 TOXIC MACULOPATHY FROM PLAQUENIL IN THERAPEUTIC USE: Primary | ICD-10-CM

## 2019-10-21 PROCEDURE — 92083 HUMPHREY VISUAL FIELD - OU - BOTH EYES: ICD-10-PCS | Mod: HCNC,S$GLB,, | Performed by: OPHTHALMOLOGY

## 2019-10-21 PROCEDURE — 92014 COMPRE OPH EXAM EST PT 1/>: CPT | Mod: HCNC,S$GLB,, | Performed by: OPHTHALMOLOGY

## 2019-10-21 PROCEDURE — 99999 PR PBB SHADOW E&M-EST. PATIENT-LVL III: CPT | Mod: PBBFAC,HCNC,, | Performed by: OPHTHALMOLOGY

## 2019-10-21 PROCEDURE — 92250 FUNDUS PHOTOGRAPHY W/I&R: CPT | Mod: HCNC,S$GLB,, | Performed by: OPHTHALMOLOGY

## 2019-10-21 PROCEDURE — 92014 PR EYE EXAM, EST PATIENT,COMPREHESV: ICD-10-PCS | Mod: HCNC,S$GLB,, | Performed by: OPHTHALMOLOGY

## 2019-10-21 PROCEDURE — 99499 UNLISTED E&M SERVICE: CPT | Mod: HCNC,S$GLB,, | Performed by: OPHTHALMOLOGY

## 2019-10-21 PROCEDURE — 92083 EXTENDED VISUAL FIELD XM: CPT | Mod: HCNC,S$GLB,, | Performed by: OPHTHALMOLOGY

## 2019-10-21 PROCEDURE — 99999 PR PBB SHADOW E&M-EST. PATIENT-LVL III: ICD-10-PCS | Mod: PBBFAC,HCNC,, | Performed by: OPHTHALMOLOGY

## 2019-10-21 PROCEDURE — 92250 COLOR FUNDUS PHOTOGRAPHY - OU - BOTH EYES: ICD-10-PCS | Mod: HCNC,S$GLB,, | Performed by: OPHTHALMOLOGY

## 2019-10-21 PROCEDURE — 99499 RISK ADDL DX/OHS AUDIT: ICD-10-PCS | Mod: HCNC,S$GLB,, | Performed by: OPHTHALMOLOGY

## 2019-10-21 RX ORDER — TRIAMCINOLONE ACETONIDE 5 MG/G
0.5 CREAM TOPICAL 2 TIMES DAILY
COMMUNITY

## 2019-10-21 RX ORDER — CHOLESTYRAMINE 4 G/4.8G
4 POWDER, FOR SUSPENSION ORAL
COMMUNITY

## 2019-10-21 NOTE — TELEPHONE ENCOUNTER
----- Message from Jessica Leahy sent at 10/21/2019 11:31 AM CDT -----  Contact: Self [phone]  PT is requesting a call to discuss her upcoming appointment. She can be reached at [phone].

## 2019-10-21 NOTE — PROGRESS NOTES
Subjective:       Patient ID: Ewelina Arnold is a 73 y.o. female      Chief Complaint   Patient presents with    3 mo Toxic Maculopathy ck     History of Present Illness  HPI     DLS 07/25/19 BY DR. VANIA MD    72 YO F here today for HVF,  Photo ( F/AF) and OCT for the evaluation of   Toxic Maculopthay from Plaquenil in the therapeutic use.     Pt states vision overall stable OU  + Headaches on left side x 2 days  -blurred vision  -eye pain   -diplopia    Eye Med: Latanoprost QHS OU       POHX:   1. 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 High risk medication use - Both Eyes   3. Nuclear sclerosis of both eyes   4. Positive dysphotopsia   5. Insufficiency of tear film of both eyes   6. Arcuate scotoma of both eyes         -    Last edited by Bryn Sierra MD on 10/21/2019  5:23 PM. (History)        Imaging:    See report    Assessment/Plan:     1. 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  Recommend not to restart  Continue f/u with Dr Ramirez  Will observe    - Autofluorescence - OU - Both Eyes  - Color Fundus Photography - OU - Both Eyes    2. Systemic lupus erythematosus, unspecified SLE type, unspecified organ involvement status  Continue f/u with Dr Swann  - Autofluorescence - OU - Both Eyes  - Color Fundus Photography - OU - Both Eyes    3. Nuclear sclerosis of both eyes  Excellent central [place].  Observe    4. 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  Continue gtts and f/u with Dr Mccray as planned    Follow up in about 3 months (around 1/21/2020), or if symptoms worsen or fail to improve, for Dilated examination, OCT Mac.

## 2019-10-21 NOTE — TELEPHONE ENCOUNTER
----- Message from Viky Harris MA sent at 10/21/2019 10:38 AM CDT -----  Good morning Dr. Rigo Martinez said to inform you  of Ms. Chen's IOP today which was  OD 10 and OS 8.    Retina

## 2019-10-21 NOTE — TELEPHONE ENCOUNTER
Patient asking if she needs to come in for her Wednesday appointment in rheum.  She has had many doctor's appointments and has many appointments this week,  all which requires a co-pay.  Asking if you could call and talk to her instead of her having to come in and pay another co-pay? Please call patient.

## 2019-10-22 ENCOUNTER — TELEPHONE (OUTPATIENT)
Dept: PHARMACY | Facility: CLINIC | Age: 74
End: 2019-10-22

## 2019-10-22 ENCOUNTER — OUTPATIENT CASE MANAGEMENT (OUTPATIENT)
Dept: ADMINISTRATIVE | Facility: OTHER | Age: 74
End: 2019-10-22

## 2019-10-22 ENCOUNTER — TELEPHONE (OUTPATIENT)
Dept: RHEUMATOLOGY | Facility: CLINIC | Age: 74
End: 2019-10-22

## 2019-10-22 DIAGNOSIS — E85.4 CARDIAC AMYLOIDOSIS: ICD-10-CM

## 2019-10-22 DIAGNOSIS — I43 CARDIAC AMYLOIDOSIS: ICD-10-CM

## 2019-10-22 NOTE — TELEPHONE ENCOUNTER
MD VENTURA Martinez Staff             Her labs are ok and she needs to cancel scheduled appt with me so put a 6 month recall with labs then     Patient called and informed of test results, verbalizes understanding.

## 2019-10-22 NOTE — TELEPHONE ENCOUNTER
Called to f/u on Vyndaqel and Imuran and arrange refills. She states that she has ~ 60 capsules of Vyndaqel (15 d/s) and 26 Imuran tablets on hand - refills NOT needed at this time. She does mention that she has a rash that originates as a little bump that eventually spreads to a quarter-sized spot. She states it is not irritating, but is red. These areas presented before she started the Imuran so she questions if the medication is working to resolve it. She uses Diprolene ointment which helps with the rash, but does not resolve completely. She has already had the rash appear on each of her arms, eyebrow, and now a new spot starting on her left forearm. Msg sent to Dr. Ramirez to advise. SWATHI

## 2019-10-22 NOTE — PROGRESS NOTES
Outpatient Care Management  Plan of Care Follow Up Visit    Patient: Ewelina Arnold  MRN: [id number]  Date of Service: 10/22/2019  Completed by: Eulalia Lynch RN  Referral Date: 09/19/2019  Program:     Reason for Visit   Patient presents with    Update Plan Of Care       Patient Summary     Involvement of Care:  Do I have permission to speak with other family members about your care?       Problem List     Patient Active Problem List   Diagnosis    Other forms of systemic lupus erythematosus    Coronary artery disease due to calcified coronary lesion    Post PTCA    Hypertension    High risk medication use - Both Eyes    Residual stage angle-closure glaucoma - Both Eyes    Nuclear sclerosis - Both Eyes    Thyroid nodule    Acquired hypothyroidism    Osteopenia    Insufficiency of tear film of both eyes    Aortic atherosclerosis    GERD (gastroesophageal reflux disease)    History of colon cancer    Dyslipidemia    Degeneration of lumbar or lumbosacral intervertebral disc    Tortuous aorta    CKD (chronic kidney disease), stage IV    Positive dysphotopsia    Weight loss, abnormal    Pulmonary hypertension    Secondary hyperparathyroidism of renal origin    Peripheral arterial disease    Major depressive disorder with single episode, in full remission    Cervical spine arthritis    Malnutrition of moderate degree    Bilateral carotid artery disease    Head trauma    Microcytic anemia    Aortic stenosis, moderate    Systolic congestive heart failure    Solitary kidney, acquired    Arcuate scotoma of both eyes    Dizziness    Cor pulmonale, chronic    Impaired glucose tolerance    Hyperaldosteronism    History of unilateral nephrectomy LEFT    Metabolic acidosis, normal anion gap (NAG)    Renovascular hypertension    Chronic diastolic heart failure    Senile purpura    Polypharmacy    Anemia of chronic renal failure, stage 4 (severe)    Hypovitaminosis D    Neuropathy due to  SLE (systemic lupus erythematosus)    Synovial cyst of Right shoudler    Vision changes    Hyponatremia    Acute heart failure with normal ejection fraction    Debility    Cardiorenal syndrome, stage 1-4 or unspecified chronic kidney disease, with heart failure    Chronic sciatica of left side    Bilateral temporomandibular joint pain    Severe calcific aortic stenosis    LVH (left ventricular hypertrophy)    Senile cardiac amyloidosis    Wild-type transthyretin-related (ATTR) amyloidosis       Reviewed Active Problem List with patient and/or Caregiver.    Patient Reported Labs & Vitals:  1.  Any Patient Reported Labs & Vitals?     2.  Patient Reported Blood Pressure:     3.  Patient Reported Pulse:     4.  Patient Reported Weight (Kg):     5.  Patient Reported Blood Glucose (mg/dl):       Medical History:  Reviewed medical history with patient and/or caregiver    Social History:  Reviewed social history with patient and/or caregiver    Clinical Assessment     Reviewed and provided basic information on available community resources for mental health, transportation, wellness resources, and palliative care programs with patient and/or caregiver.    Complex Care Plan     Care plan was discussed and completed today with input from patient and/or caregiver.    Goals        Blood Pressure     Blood Pressure < 130/80 (pt-stated)      Take at least one BP reading per week at various times of the day        Diet     Maintain a low sodium diet        Exercise     Exercise at least 150 minutes per week.        Outpatient Case Management     Patient/caregiver will accept life style changes to manage CKD/Anemia prior to discharge from hospitals. - Priority: HIGH      Overall Time to Completion  2 months from 08/26/2019    hospitals Identified Patient Barriers:  Health Literacy  -Care Plan Created  Support---Referred to hospitals   Housing--Referred to hospitals   Fall Risk  -Care Plan  Created  Nutrition---Care Plan Created  Transportation---Referred to [facility]   Financial Support---Referred to [facility]       RN Identified Patient Barriers:  Housing---Referred to [facility]   Equipment/Supplies/Services---Care Plan Created  Advanced Care Planning---Referred to [facility]       Short Term Goals  Patient/caregiver will limit fluid intake to 1 milliliters per day within 2 weeks.  Interventions   · Recognize and provide educational material (BRANDON).        10/21- pt confirms max PO intake 48 oz day.    Status  · Met   10/21      Patient/caregiver will verbalize 2 food items Renal within 1 month.  Interventions   · Encourage compliance with Physician follow-ups.  · Encourage compliance with scheduled laboratory testing and procedure.  · Recognize and provide educational material (BRANDON).  · Refer to Dietician.   If needed. Pt last saw dietitian 10/2018  · Anemia management.  · Pt safety, fall prevention.  · 9/6-- educated pt in foods high in Potassium to keep limited 2* CKD IV- potatoes, bananas, tomatoes. Caution advised since pt is taking Lasix that depletes body of K+. Recommended that pt seek specific guidelines from her PCP and/or Nephro.                  10/21--Provided pt with education on foods good to eat in renal diet:   Fruits : Apples, without skin; Apricot; Blueberries*; Grapes, red or green; Peach; Pears,...  Vegetables : Asparagus*; Cabbage, red;                             Cauliflower; Celery; Garlic; Lettuce,...  Protein : Fish*; Chicken; Tempeh*; Tofu*.  Miscellaneous : Carob (good alternative to                                       chocolate); Flaxseed Oil*; Hummus; Popcorn,   In summary: fruits, veggies, low-fat dairy products, whole grains, fish, poultry, beans, seeds, and nuts. Its low in sodium, sugars and sweets, fats, and red meats.    10/21-- Mailed above food recommendations.        Status  · Met  10/21           Clinical Reference  Documents Added to Patient Instructions       Document    CHRONIC KIDNEY DISEASE (CKD) (ENGLISH)    CHRONIC KIDNEY DISEASE, DIET FOR (ENGLISH)    FALL DUE TO DIZZINESS, WEAKNESS, OR LOSS OF BALANCE (ENGLISH)    FALLS, PREVENTING, MAKING CHANGES IN YOUR LIVING SPACE (ENGLISH)  ANEMIA              Patient/caregiver will be able to name all current medications as ordered by Physician prior to [facility]. - Priority: High      Overall Time to Completion  2 months from 08/26/2019     [facility] Identified Patient Barriers:  Health Literacy  -Care Plan Created  Support---[facility]   Housing--Referred to [facility]   Fall Risk  -Care Plan Created  Nutrition---Care Plan Created  Transportation---[facility]erred to [facility]   Financial Support---[facility]       RN Identified Patient Barriers:  Housing---Ref[facility]   Equipment/Supplies/Services---Care Plan Created  Advanced Care Planning---[facility]       Patient/caregiver will verbalize med rec clarifications per physician  Lasix, Estrogen and Imuran  within 3 weeks.  Interventions   · Assess for retention of the signs and symptoms of disease specific exacerbation.  · Complete medication reconciliation.  · Empower patient/caregiver to discuss treatment plan with Physician/care team.  · Encourage compliance with Physician follow-ups.  · Encourage Medication Compliance.  · 8/26- In basket message sent to Dr Johns, PCP regarding dose/freq of Lasix (pt taking 40 mg BID) and the use of Estrogen listed in Epic.   · 8/26- In basket message sent to Dr. Luciano Ramirez, [facility] regarding Imuran dose and present itchy rash-- pt's response to Imuran ?  · 8/27--Med discrepancies clarified with responses from [facility] and PCP.   ·            Status  · MET 8/27       Clinical Reference Documents Added to Patient Instructions       Document    CHRONIC KIDNEY DISEASE (CKD) (ENGLISH)    CHRONIC KIDNEY  DISEASE, DIET FOR (ENGLISH)    FALL DUE TO DIZZINESS, WEAKNESS, OR LOSS OF BALANCE (ENGLISH)    FALLS, PREVENTING, MAKING CHANGES IN YOUR LIVING SPACE (ENGLISH)  ANEMIA              Patient/caregiver will have an action plan in place to manage and prevent complications of CHF prior to discharge from [facility]. - Priority: HIGH      Overall Time to Completion  2 months from 08/26/2019      [facility] Identified Patient Barriers:  Health Literacy  -Care Plan Created  Support---Referred to [facility]   Housing--Referred to [facility]   Fall Risk  -Care Plan Created  Nutrition---Care Plan Created  Transportation---Referred to [facility]   Financial Support---Referred to [facility]       RN Identified Patient Barriers:  Housing---Referred to [facility]   Equipment/Supplies/Services---Care Plan Created  Advanced Care Planning---Referred to [facility]         Short Term Goals  Patient/caregiver will limit fluid intake to 1 milliliters per day within 2 weeks.  Interventions   · Empower patient/caregiver to discuss treatment plan with Physician/care team.  · Recognize and provide educational material (BRANDON).    10/21-- Pt confirms following a restricted PO fluid intake of  (6) 8 oz/day.      Status  · Met  10/21      Patient/caregiver will measure and record the weight 1 times per day for Now.  Interventions   · Assess for availability of working scale in home setting.  · Assess for retention of the signs and symptoms of disease specific exacerbation.     Status  · Met  8/26      Patient/caregiver will notify doctor if patient gains more than 3 pounds in one day or 5 pounds in one week within 1 month.  Interventions   · Assess for retention of the signs and symptoms of disease specific exacerbation.  · Collaborate with Physician as appropriate to meet patient needs.  · Encourage compliance with Physician follow-ups.  · Encourage Dietary Compliance.  · Encourage Medication  Compliance.  9/6--Restated PCP's guidelines for taking Lasix 40 mg BID & PRN, with wt goal 109 -111 lbs. Hold Lasix dose for wts <109.   9/6--Educated pt in foods high in Potassium to keep limited 2* CKD IV- Krames CKD Diet---ie  Milk, yogurt, cheese, dried beans, potatoes, bananas, tomatoes, cantalope, honeydew melon, dried fruits & nuts.   9/6--Caution advised since pt is taking Lasix that depletes body of K+. Recommended that pt seek specific guidelines from her PCP and/or Nephro.    Status  · Met   9/6      Patient/caregiver will verbalize 2 food items Low Sodium within 2 weeks.  Interventions   · Encourage Dietary Compliance.  · Review eating/nutrition habits.                10/21- Pt states eating fresh/frozen fruits and vegs, always checks the food labels for sodium content .   Status  · Met  10/21      Patient/caregiver will verbalize 2 strategies to get adequate sleep and rest, proper breathing techniques and maximize safety within 1 month.  Interventions   · Assess for retention of the signs and symptoms of disease specific exacerbation.  · Assess patient's ability to perform ADLs.   · Conserve energy.   · Pt safety, fall prevention  · 10/21- Pt reports getting sleep/rest that is interrupted by having to go to bathroom 2* diuretic effects.      Status  · Met  10/21      Patient/caregiver will verbalize 2 ways of preventing complications due to disease process within 1 month.  Interventions   · Encourage compliance with Physician follow-ups.  · Encourage compliance with preventive screenings.  · Encourage compliance with scheduled laboratory testing and procedure.  · Encourage Dietary Compliance.  · Encourage Exercise.  · Encourage Medication Compliance.  · Provide contact information for [facility].  · Provide contact information for Outpatient Case Management contact information.  · Provide contact information for Physician office phone number.   ·      Status  · Partially  met           Clinical Reference Documents Added to Patient Instructions       Document    CHRONIC KIDNEY DISEASE (CKD) (ENGLISH)    CHRONIC KIDNEY DISEASE, DIET FOR (ENGLISH)    FALL DUE TO DIZZINESS, WEAKNESS, OR LOSS OF BALANCE (ENGLISH)    FALLS, PREVENTING, MAKING CHANGES IN YOUR LIVING SPACE (ENGLISH)  ANEMIA              Patient/caregiver will have knowledge of resources available in order to obtain the services that are needed prior to discharge from [facility]. - Priority: HIGH      Overall Time to Completion  2 months from 08/26/2019    [facility] Identified Patient Barriers:  Health Literacy  -Care Plan Created  Support---Referred to [facility]   Housing--Referred to [facility]   Fall Risk  -Care Plan Created  Nutrition---Care Plan Created  Transportation---Referred to [facility]   Financial Support---Referred to [facility]       RN Identified Patient Barriers:  Housing---Referred to [facility]   Equipment/Supplies/Services---Care Plan Created  Advanced Care Planning---Referred to [facility]         Short Term Goals  Patient/caregiver will have contact information for identified community resources IE:[facility]  for follow-up within 1 month.  Interventions   · Facilitate needed DME.  · Refer to Community Resource: [facility] [address]  · Refer to Outpatient Case Management Social Worker.         F/u on pt's request for scooter.         Verify pt enrolled in the [employer] plan with added benefits as it appears pt is. Review  for card photo.        9/6--Pt declines wish to pursue scooter for now. If anything, she would be interested in info on community assist with home ramp installations. Will advise [facility] Adelina INMAN.   9/6--- Collaborated with [facility] HENNY-- referral redirected from [facility] Mark ENRIQUEZ.  9/16---Care coordination for Nutrition appt for CKD meal planning.    Via Patient Portal provided pt with the [employer] Website:   [email or web address]  And provided the [employer] contact phone # that pt requested (and RN CM could not locate during phone encounter).  9/16--Initiated [employer] Application with pt to start process for pt.  10/21-- Pt still waiting to hear back from [employer] re application. Called [employer] -informed there is no record of pt's application on file.   Collaborated with [facility] HENNY.   10/22-- [facility] who also was told today that mailed [employer] application was never received. LMSW copied original however only original accepted. [facility] plans to meet with pt at time of appt 10/25 for pt signature/ PCP signature and hand delivery application to [employer] office. Informed pt of plan and to expect to be contacted by [facility] .      Status  · Met   10/22           Clinical Reference Documents Added to Patient Instructions       Document    CHRONIC KIDNEY DISEASE (CKD) (ENGLISH)    CHRONIC KIDNEY DISEASE, DIET FOR (ENGLISH)    FALL DUE TO DIZZINESS, WEAKNESS, OR LOSS OF BALANCE (ENGLISH)    FALLS, PREVENTING, MAKING CHANGES IN YOUR LIVING SPACE (ENGLISH)  ANEMIA                   Patient Instructions     Instructions were provided via the [employer] patient resources and are available for the patient to view on the patient portal.    No follow-ups on file.     Summary:  Goals met. Pt may call RN CM should future needs arise in any respect.   RN CM to monitor pt status and close case to coincide with [facility] case closure.       [facility] Self-Management Care Plan was developed with the patients/caregivers input and was based on identified barriers from todays assessment.  Goals were written today with the patient/caregiver and the patient has agreed to work towards these goals to improve his/her overall well-being. Patient verbalized understanding of the care plan, goals, and all of today's instructions. Encouraged patient/caregiver to communicate with his/her physician and health care team about health  conditions and the treatment plan.  Provided my contact information today and encouraged patient/caregiver to call me with any questions as needed.

## 2019-10-22 NOTE — PROGRESS NOTES
Note from 10/21/19    Outpatient Care Management  Plan of Care Follow Up Visit    Patient: Ewelina Arnold  MRN: [id number]  Date of Service: 10/22/2019  Completed by: Eulalia Lynch RN  Referral Date: 09/19/2019  Program:     Reason for Visit   Patient presents with    Update Plan Of Care       Patient Summary     Involvement of Care:  Do I have permission to speak with other family members about your care?       Problem List     Patient Active Problem List   Diagnosis    Other forms of systemic lupus erythematosus    Coronary artery disease due to calcified coronary lesion    Post PTCA    Hypertension    High risk medication use - Both Eyes    Residual stage angle-closure glaucoma - Both Eyes    Nuclear sclerosis - Both Eyes    Thyroid nodule    Acquired hypothyroidism    Osteopenia    Insufficiency of tear film of both eyes    Aortic atherosclerosis    GERD (gastroesophageal reflux disease)    History of colon cancer    Dyslipidemia    Degeneration of lumbar or lumbosacral intervertebral disc    Tortuous aorta    CKD (chronic kidney disease), stage IV    Positive dysphotopsia    Weight loss, abnormal    Pulmonary hypertension    Secondary hyperparathyroidism of renal origin    Peripheral arterial disease    Major depressive disorder with single episode, in full remission    Cervical spine arthritis    Malnutrition of moderate degree    Bilateral carotid artery disease    Head trauma    Microcytic anemia    Aortic stenosis, moderate    Systolic congestive heart failure    Solitary kidney, acquired    Arcuate scotoma of both eyes    Dizziness    Cor pulmonale, chronic    Impaired glucose tolerance    Hyperaldosteronism    History of unilateral nephrectomy LEFT    Metabolic acidosis, normal anion gap (NAG)    Renovascular hypertension    Chronic diastolic heart failure    Senile purpura    Polypharmacy    Anemia of chronic renal failure, stage 4 (severe)    Hypovitaminosis D     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    Patient Reported Labs & Vitals:  1.  Any Patient Reported Labs & Vitals?     2.  Patient Reported Blood Pressure:     3.  Patient Reported Pulse:     4.  Patient Reported Weight (Kg):     5.  Patient Reported Blood Glucose (mg/dl):       Medical History:  Reviewed medical history with patient and/or caregiver    Social History:  Reviewed social history with patient and/or caregiver    Clinical Assessment     Reviewed and provided basic information on available community resources for mental health, transportation, wellness resources, and palliative care programs with patient and/or caregiver.    Complex Care Plan     Care plan was discussed and completed today with input from patient and/or caregiver.    Goals        Blood Pressure     Blood Pressure < 130/80 (pt-stated)      Take at least one BP reading per week at various times of the day        Diet     Maintain a low sodium diet        Exercise     Exercise at least 150 minutes per week.        Outpatient Case Management     Patient/caregiver will accept life style changes to manage CKD/Anemia prior to discharge from [facility]. - Priority: HIGH      Overall Time to Completion  2 months from 08/26/2019    [facility] Identified Patient Barriers:  Health Literacy  -Care Plan Created  Support---Referred to [facility]   Housing--Referred to [facility]   Fall Risk  -Care  Plan Created  Nutrition---Care Plan Created  Transportation---Referred to [facility]   Financial Support---Referred to [facility]       RN Identified Patient Barriers:  Housing---Referred to [facility]   Equipment/Supplies/Services---Care Plan Created  Advanced Care Planning---Referred to [facility]       Short Term Goals  Patient/caregiver will limit fluid intake to 1 milliliters per day within 2 weeks.  Interventions   · Recognize and provide educational material (BRANDON).        10/21- pt confirms max PO intake 48 oz day.    Status  · Met   10/21      Patient/caregiver will verbalize 2 food items Renal within 1 month.  Interventions   · Encourage compliance with Physician follow-ups.  · Encourage compliance with scheduled laboratory testing and procedure.  · Recognize and provide educational material (BRANDON).  · Refer to Dietician.   If needed. Pt last saw dietitian 10/2018  · Anemia management.  · Pt safety, fall prevention.  · 9/6-- educated pt in foods high in Potassium to keep limited 2* CKD IV- potatoes, bananas, tomatoes. Caution advised since pt is taking Lasix that depletes body of K+. Recommended that pt seek specific guidelines from her PCP and/or Nephro.                  10/21--Provided pt with education on foods good to eat in renal diet:   Fruits : Apples, without skin; Apricot; Blueberries*; Grapes, red or green; Peach; Pears,...  Vegetables : Asparagus*; Cabbage, red;                             Cauliflower; Celery; Garlic; Lettuce,...  Protein : Fish*; Chicken; Tempeh*; Tofu*.  Miscellaneous : Carob (good alternative to                                       chocolate); Flaxseed Oil*; Hummus; Popcorn,   In summary: fruits, veggies, low-fat dairy products, whole grains, fish, poultry, beans, seeds, and nuts. Its low in sodium, sugars and sweets, fats, and red meats.    10/21-- Mailed above food recommendations.        Status  · Met  10/21           Clinical Reference  Documents Added to Patient Instructions       Document    CHRONIC KIDNEY DISEASE (CKD) (ENGLISH)    CHRONIC KIDNEY DISEASE, DIET FOR (ENGLISH)    FALL DUE TO DIZZINESS, WEAKNESS, OR LOSS OF BALANCE (ENGLISH)    FALLS, PREVENTING, MAKING CHANGES IN YOUR LIVING SPACE (ENGLISH)  ANEMIA              Patient/caregiver will be able to name all current medications as ordered by Physician prior to discharge from hospitals. - Priority: High      Overall Time to Completion  2 months from 08/26/2019     hospitals Identified Patient Barriers:  Health Literacy  -Care Plan Created  Support---Referred to hospitals   Housing--Referred to [facility]   Fall Risk  -Care Plan Created  Nutrition---Care Plan Created  Transportation---Referred to hospitals   Financial Support---Referred to hospitals       RN Identified Patient Barriers:  Housing---Referred to hospitals   Equipment/Supplies/Services---Care Plan Created  Advanced Care Planning---Referred to hospitals       Patient/caregiver will verbalize med rec clarifications per physician  Lasix, Estrogen and Imuran  within 3 weeks.  Interventions   · Assess for retention of the signs and symptoms of disease specific exacerbation.  · Complete medication reconciliation.  · Empower patient/caregiver to discuss treatment plan with Physician/care team.  · Encourage compliance with Physician follow-ups.  · Encourage Medication Compliance.  · 8/26- In basket message sent to Dr Johns, PCP regarding dose/freq of Lasix (pt taking 40 mg BID) and the use of Estrogen listed in Epic.   · 8/26- In basket message sent to Dr. Luciano Ramirez, [facility] regarding Imuran dose and present itchy rash-- pt's response to Imuran ?  · 8/27--Med discrepancies clarified with responses from [facility] and PCP.   ·            Status  · MET 8/27       Clinical Reference Documents Added to Patient Instructions       Document    CHRONIC KIDNEY DISEASE (CKD) (ENGLISH)    CHRONIC KIDNEY  DISEASE, DIET FOR (ENGLISH)    FALL DUE TO DIZZINESS, WEAKNESS, OR LOSS OF BALANCE (ENGLISH)    FALLS, PREVENTING, MAKING CHANGES IN YOUR LIVING SPACE (ENGLISH)  ANEMIA              Patient/caregiver will have an action plan in place to manage and prevent complications of CHF prior to discharge from [facility]. - Priority: HIGH      Overall Time to Completion  2 months from 08/26/2019      [facility] Identified Patient Barriers:  Health Literacy  -Care Plan Created  Support---Referred to [facility]   Housing--Referred to [facility]   Fall Risk  -Care Plan Created  Nutrition---Care Plan Created  Transportation---Referred to [facility]   Financial Support---Referred to [facility]       RN Identified Patient Barriers:  Housing---Referred to [facility]   Equipment/Supplies/Services---Care Plan Created  Advanced Care Planning---Referred to [facility]         Short Term Goals  Patient/caregiver will limit fluid intake to 1 milliliters per day within 2 weeks.  Interventions   · Empower patient/caregiver to discuss treatment plan with Physician/care team.  · Recognize and provide educational material (BRANDON).    10/21-- Pt confirms following a restricted PO fluid intake of  (6) 8 oz/day.      Status  · Met  10/21      Patient/caregiver will measure and record the weight 1 times per day for Now.  Interventions   · Assess for availability of working scale in home setting.  · Assess for retention of the signs and symptoms of disease specific exacerbation.     Status  · Met  8/26      Patient/caregiver will notify doctor if patient gains more than 3 pounds in one day or 5 pounds in one week within 1 month.  Interventions   · Assess for retention of the signs and symptoms of disease specific exacerbation.  · Collaborate with Physician as appropriate to meet patient needs.  · Encourage compliance with Physician follow-ups.  · Encourage Dietary Compliance.  · Encourage Medication  Compliance.  9/6--Restated PCP's guidelines for taking Lasix 40 mg BID & PRN, with wt goal 109 -111 lbs. Hold Lasix dose for wts <109.   9/6--Educated pt in foods high in Potassium to keep limited 2* CKD IV- Krames CKD Diet---ie  Milk, yogurt, cheese, dried beans, potatoes, bananas, tomatoes, cantalope, honeydew melon, dried fruits & nuts.   9/6--Caution advised since pt is taking Lasix that depletes body of K+. Recommended that pt seek specific guidelines from her PCP and/or Nephro.    Status  · Met   9/6      Patient/caregiver will verbalize 2 food items Low Sodium within 2 weeks.  Interventions   · Encourage Dietary Compliance.  · Review eating/nutrition habits.                10/21- Pt states eating fresh/frozen fruits and vegs, always checks the food labels for sodium content .   Status  · Met  10/21      Patient/caregiver will verbalize 2 strategies to get adequate sleep and rest, proper breathing techniques and maximize safety within 1 month.  Interventions   · Assess for retention of the signs and symptoms of disease specific exacerbation.  · Assess patient's ability to perform ADLs.   · Conserve energy.   · Pt safety, fall prevention  · 10/21- Pt reports getting sleep/rest that is interrupted by having to go to bathroom 2* diuretic effects.      Status  · Met  10/21      Patient/caregiver will verbalize 2 ways of preventing complications due to disease process within 1 month.  Interventions   · Encourage compliance with Physician follow-ups.  · Encourage compliance with preventive screenings.  · Encourage compliance with scheduled laboratory testing and procedure.  · Encourage Dietary Compliance.  · Encourage Exercise.  · Encourage Medication Compliance.  · [facility].  · Provide contact information for Outpatient Case Management contact information.  · Provide contact information for Physician office phone number.   ·      Status  · Partially  met           Clinical Reference Documents Added to Patient Instructions       Document    CHRONIC KIDNEY DISEASE (CKD) (ENGLISH)    CHRONIC KIDNEY DISEASE, DIET FOR (ENGLISH)    FALL DUE TO DIZZINESS, WEAKNESS, OR LOSS OF BALANCE (ENGLISH)    FALLS, PREVENTING, MAKING CHANGES IN YOUR LIVING SPACE (ENGLISH)  ANEMIA              Patient/caregiver will have knowledge of resources available in order to obtain the services that are needed prior to discharge from [facility]. - Priority: HIGH      Overall Time to Completion  2 months from 08/26/2019    [facility] Identified Patient Barriers:  Health Literacy  -Care Plan Created  Support---Referred to [facility]   Housing--Referred to [facility]   Fall Risk  -Care Plan Created  Nutrition---Care Plan Created  Transportation---Referred to [facility]   Financial Support---Referred to [facility]       RN Identified Patient Barriers:  Housing---Referred to [facility]   Equipment/Supplies/Services---Care Plan Created  Advanced Care Planning---Referred to [facility]         Short Term Goals  Patient/caregiver will have contact information for identified community resources IE:[facility]  for follow-up within 1 month.  Interventions   · Facilitate needed DME.  · Refer to Community Resource: [facility]..  · Refer to Outpatient Case Management Social Worker.         F/u on pt's request for scooter.         Verify pt enrolled in the [employer] plan with added benefits as it appears pt is. Review  for card photo.        9/6--Pt declines wish to pursue scooter for now. If anything, she would be interested in info on community assist with home ramp installations. Will advise [facility] Adelina INMAN.   9/6--- Collaborated with [facility] HENNY-- referral redirected from [facility] Mark ENRIQUEZ.  9/16---Care coordination for Nutrition appt for CKD meal planning.    Via Patient Portal provided pt with the [employer] Website:   [email or web address]  And provided the [employer]TA Lift contact phone # that pt requested (and RN CM could not locate during phone encounter).  9/16--Initiated RTA Lift Application with pt to start process for pt.  10/21-- Pt still waiting to hear back from [employer] re application. Called RTA -informed there is no record of pt's application on file.   Collaborated with [facility] LCSW.      Status  · Partially met           Clinical Reference Documents Added to Patient Instructions       Document    CHRONIC KIDNEY DISEASE (CKD) (ENGLISH)    CHRONIC KIDNEY DISEASE, DIET FOR (ENGLISH)    FALL DUE TO DIZZINESS, WEAKNESS, OR LOSS OF BALANCE (ENGLISH)    FALLS, PREVENTING, MAKING CHANGES IN YOUR LIVING SPACE (ENGLISH)  ANEMIA                   Patient Instructions     Instructions were provided via the [employer] patient [employer] and are available for the patient to view on the patient portal.    No follow-ups on file.     Summary:  Pt agrees to f/u RN CM call with information on RTA Application status.       Today[facility] Self-Management Care Plan was developed with the patients/caregivers input and was based on identified barriers from todays assessment.  Goals were written today with the patient/caregiver and the patient has agreed to work towards these goals to improve his/her overall well-being. Patient verbalized understanding of the care plan, goals, and all of today's instructions. Encouraged patient/caregiver to communicate with his/her physician and health care team about health conditions and the treatment plan.  Provided my contact information today and encouraged patient/caregiver to call me with any questions as needed.

## 2019-10-23 NOTE — TELEPHONE ENCOUNTER
Dr. Ramirez' response: Not really. This is part of her lupus that has improved but not resolved on azathioprine. She can expect to continue with intermittent lesions that she should treat with topical steroids. Dermatology is following her as well.   WD   Call attempted to inform patient to proceed with Imuran and continue to f/u with rhueum and derm - NA, NVM. TTN

## 2019-10-25 ENCOUNTER — OFFICE VISIT (OUTPATIENT)
Dept: NEPHROLOGY | Facility: CLINIC | Age: 74
End: 2019-10-25
Payer: MEDICARE

## 2019-10-25 ENCOUNTER — OUTPATIENT CASE MANAGEMENT (OUTPATIENT)
Dept: ADMINISTRATIVE | Facility: OTHER | Age: 74
End: 2019-10-25

## 2019-10-25 ENCOUNTER — INFUSION (OUTPATIENT)
Dept: INFECTIOUS DISEASES | Facility: HOSPITAL | Age: 74
End: 2019-10-25
Attending: INTERNAL MEDICINE
Payer: MEDICARE

## 2019-10-25 VITALS
SYSTOLIC BLOOD PRESSURE: 163 MMHG | HEART RATE: 58 BPM | OXYGEN SATURATION: 94 % | WEIGHT: 117.31 LBS | BODY MASS INDEX: 19.84 KG/M2 | HEIGHT: 64 IN | HEIGHT: 64 IN | SYSTOLIC BLOOD PRESSURE: 148 MMHG | WEIGHT: 116.19 LBS | DIASTOLIC BLOOD PRESSURE: 68 MMHG | DIASTOLIC BLOOD PRESSURE: 69 MMHG | BODY MASS INDEX: 20.03 KG/M2

## 2019-10-25 DIAGNOSIS — N18.4 ANEMIA IN STAGE 4 CHRONIC KIDNEY DISEASE: ICD-10-CM

## 2019-10-25 DIAGNOSIS — N18.4 CHRONIC KIDNEY DISEASE, STAGE IV (SEVERE): Primary | ICD-10-CM

## 2019-10-25 DIAGNOSIS — D63.1 ANEMIA IN STAGE 4 CHRONIC KIDNEY DISEASE: ICD-10-CM

## 2019-10-25 DIAGNOSIS — D63.1 ANEMIA OF CHRONIC RENAL FAILURE, STAGE 4 (SEVERE): Primary | ICD-10-CM

## 2019-10-25 DIAGNOSIS — N18.4 ANEMIA OF CHRONIC RENAL FAILURE, STAGE 4 (SEVERE): Primary | ICD-10-CM

## 2019-10-25 DIAGNOSIS — E26.9 HYPERALDOSTERONISM: ICD-10-CM

## 2019-10-25 PROCEDURE — 3078F DIAST BP <80 MM HG: CPT | Mod: HCNC,CPTII,S$GLB, | Performed by: INTERNAL MEDICINE

## 2019-10-25 PROCEDURE — 96372 THER/PROPH/DIAG INJ SC/IM: CPT | Mod: HCNC

## 2019-10-25 PROCEDURE — 99214 OFFICE O/P EST MOD 30 MIN: CPT | Mod: HCNC,S$GLB,, | Performed by: INTERNAL MEDICINE

## 2019-10-25 PROCEDURE — 3077F PR MOST RECENT SYSTOLIC BLOOD PRESSURE >= 140 MM HG: ICD-10-PCS | Mod: HCNC,CPTII,S$GLB, | Performed by: INTERNAL MEDICINE

## 2019-10-25 PROCEDURE — 99214 PR OFFICE/OUTPT VISIT, EST, LEVL IV, 30-39 MIN: ICD-10-PCS | Mod: HCNC,S$GLB,, | Performed by: INTERNAL MEDICINE

## 2019-10-25 PROCEDURE — 3077F SYST BP >= 140 MM HG: CPT | Mod: HCNC,CPTII,S$GLB, | Performed by: INTERNAL MEDICINE

## 2019-10-25 PROCEDURE — 1101F PR PT FALLS ASSESS DOC 0-1 FALLS W/OUT INJ PAST YR: ICD-10-PCS | Mod: HCNC,CPTII,S$GLB, | Performed by: INTERNAL MEDICINE

## 2019-10-25 PROCEDURE — 63600175 PHARM REV CODE 636 W HCPCS: Mod: JG,HCNC | Performed by: INTERNAL MEDICINE

## 2019-10-25 PROCEDURE — 99999 PR PBB SHADOW E&M-EST. PATIENT-LVL V: ICD-10-PCS | Mod: PBBFAC,HCNC,, | Performed by: INTERNAL MEDICINE

## 2019-10-25 PROCEDURE — 3078F PR MOST RECENT DIASTOLIC BLOOD PRESSURE < 80 MM HG: ICD-10-PCS | Mod: HCNC,CPTII,S$GLB, | Performed by: INTERNAL MEDICINE

## 2019-10-25 PROCEDURE — 1101F PT FALLS ASSESS-DOCD LE1/YR: CPT | Mod: HCNC,CPTII,S$GLB, | Performed by: INTERNAL MEDICINE

## 2019-10-25 PROCEDURE — 99999 PR PBB SHADOW E&M-EST. PATIENT-LVL V: CPT | Mod: PBBFAC,HCNC,, | Performed by: INTERNAL MEDICINE

## 2019-10-25 RX ADMIN — DARBEPOETIN ALFA 100 MCG: 100 INJECTION, SOLUTION INTRAVENOUS; SUBCUTANEOUS at 09:10

## 2019-10-25 NOTE — PROGRESS NOTES
Summary:  [facility] attempted to meet with pt to assist with RTA lift application at her clinic appointment at [facility]. Pt was seen at all of her scheduled appointments and was able to leave [facility] before 9:45AM. LMSW called pt to schedule a home visit on Monday October 28, 2019 but pt is requesting a call back in approximately one hour. LMSW called RTA lift and the  reports that she has not received the application that was mailed by this [facility] LMSW.     Interventions:  Followed up with RTA lift regarding receipt of  application    Plan:  Assist pt with RTA lift application      Today[facility] Self-Management Care Plan was developed with the patients/caregivers input and was based on identified barriers from todays assessment.  Goals were written today with the patient/caregiver and the patient has agreed to work towards these goals to improve his/her overall well-being. Patient verbalized understanding of the care plan, goals, and all of today's instructions. Encouraged patient/caregiver to communicate with his/her physician and health care team about health conditions and the treatment plan.  Provided my contact information today and encouraged patient/caregiver to call me with any questions as needed.

## 2019-10-25 NOTE — PROGRESS NOTES
Hgb 11.1;Hct 36.5    No dose change  PER PROTOCOL FORM DROD-188 instead we increase interval between injections to 3 weeks. Aranesp 100 mcg given and 3 week return appt set. Iron sats down to 13% patient in not on any oral Iron.  Dr Zhou diaz.    Gfr 18.6/stage 4

## 2019-10-28 ENCOUNTER — PATIENT OUTREACH (OUTPATIENT)
Dept: ADMINISTRATIVE | Facility: HOSPITAL | Age: 74
End: 2019-10-28

## 2019-10-28 ENCOUNTER — PATIENT MESSAGE (OUTPATIENT)
Dept: PRIMARY CARE CLINIC | Facility: CLINIC | Age: 74
End: 2019-10-28

## 2019-10-29 ENCOUNTER — TELEPHONE (OUTPATIENT)
Dept: PHARMACY | Facility: CLINIC | Age: 74
End: 2019-10-29

## 2019-10-29 NOTE — TELEPHONE ENCOUNTER
Refill and follow up complete Vyndaqel. Patient confirmed need for refill, has 40 capsules on hand (10 day supply). Shipment set for 10/31 for patient to receive 11/1. She reports having 21 tablets of Imuran on hand, denies any missed doses, however does not need refill at this time, will continue to monitor adherence. Will open separate refill activity. No changes to report to other medications or conditions. She is still taking both medications as directed. No questions or concerns today, patient is aware to let [facility] know if anything comes up.

## 2019-11-01 NOTE — PROGRESS NOTES
Subjective:       Patient ID: Ewelina Arnold is a 73 y.o. Black or  female who presents for re-evaluation of progression of Chronic Kidney Disease    HPI    Ms. 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  Patient reports daily weights have been relatively stable.  She reports shortness of breath with exertion, stable (even slightly improved since last visit).  She reports blood pressure improved and relatively stable since last visit, reviewed BP diary.  She otherwise denies any fever, chest pain, abdominal pain, dysuria/hematuria.     Review of Systems   Constitutional: Negative for appetite change, fatigue and fever.   Respiratory: Negative for chest tightness and shortness of breath.    Cardiovascular: Negative for chest pain and leg swelling.   Gastrointestinal: Negative for abdominal pain, constipation, diarrhea, nausea and vomiting.   Genitourinary: Negative for difficulty urinating, dysuria, flank pain, frequency, hematuria and urgency.   Musculoskeletal: Negative for arthralgias, joint swelling and myalgias.   Skin: Negative for rash and wound.   Neurological: Negative for dizziness, weakness and light-headedness.   All other systems reviewed and are negative.      Objective:      Physical Exam   Constitutional: She appears well-developed and well-nourished.   Cardiovascular: Normal rate, regular rhythm and normal heart sounds. Exam reveals no gallop and no friction rub.   No murmur heard.  Pulmonary/Chest: Effort normal and breath sounds normal. No respiratory distress. She has no wheezes. She has no rales.   Abdominal: Soft. Bowel sounds are normal. There is no tenderness.   Musculoskeletal: She exhibits  no edema.   Neurological: She is alert.   Skin: Skin is warm and dry. No rash noted. No erythema.   Psychiatric: She has a normal mood and affect.       Assessment:       1. Chronic kidney disease, stage IV (severe)    2. Solitary kidney    3. Anemia in stage 4 chronic kidney disease    4. Hyperaldosteronism        Plan:     Ms. Arnold is a 73 year old woman with medical history of hypertension, chronic kidney disease stage IV with single kidney (donated kidney to her brother), hyperaldosteronism, coronary artery disease and severe aortic stenosis, recently diagnosed with amyloid cardiomyopathy  presenting for further management of blood pressure and kidney function.  Patient previously with acute kidney injury, likely due to cardiomyopathy and moderate volume depletion with diuresis, creatinine improved prior to and since discharge, improved since last visit (4.5 to 2.8mg/dL).  Will trend symptoms/creatinine closely, will phone review BP diary and daily weights.  Encouraged oral/fluid intake for now, suspect dry weight ~110-115lbs.  Further recommendations pending results of above, patient voiced understanding of above, agreeable to plan as noted.    - Aortic Stenosis/amyloid cardiomyopathy: patient seen by Interventional Cardiology, they are reluctant to perform TAVR given patient's kidney function and risk for worsening function requiring dialysis (though would suspect kidney function may improve with intervention).  Discussed with Dr. Burdick, will monitor response to treatment for cardiac amyloidosis (Vyndaqel, tafamidis) prior to considering initiation of HD (discussed at length with patient), creatinine improved since last visit.   - Anemia: Hgb previously at goal, given IV iron, continue darbepoetin in Anemia clinic  - Bone/mineral metabolism: patient with secondary hyperparathyroidism, PTH at goal for stage CKD, patient on ergocalciferol for VitD deficiency    Return to clinic in 8-12 weeks pending renal panel  within a month, then with renal/heme panel, iron/TIBC/ferritin, urinalysis/culture, urine protein/creatinine ratio prior to next visit

## 2019-11-03 NOTE — TELEPHONE ENCOUNTER
Reason for Disposition   Patient wants to be seen    Protocols used: ST HIGH BLOOD PRESSURE-A-OH    
B/P 194/84  B/P  152/66 71  Avg. 150/60  
N/A

## 2019-11-05 ENCOUNTER — TELEPHONE (OUTPATIENT)
Dept: INTERNAL MEDICINE | Facility: CLINIC | Age: 74
End: 2019-11-05

## 2019-11-05 NOTE — TELEPHONE ENCOUNTER
----- Message from Whitley Varma sent at 11/5/2019  2:10 PM CST -----  Contact: Patient [phone]  Patient is calling about the ride for the appointment on 11/12/19.  Request call back.    Please call and advise  Thank you

## 2019-11-06 NOTE — PROGRESS NOTES
Summary:  OPCM LMSW followed up with pt telephonically. OPCM LMSW informed pt that her completed RTA lift application was brought to the RTA office on [facility] [place]. Pt reports that she is looking forward to using RTA lift services. Pt denied any additional needs at this time and is in agreement with case closure at this time. LMSW also provided contact information and encouraged pt to call if any additional needs arise.    Interventions:  Provided RTA with completed RTA lift application  Followed up with pt regarding RTA lift application  Case Closure; pt is in agreement with case closure at this time   Collaborated with OPCM RN   Provided LMSW contact information    Plan:  Close case at this time    Todays OPCM Self-Management Care Plan was developed with the patients/caregivers input and was based on identified barriers from todays assessment.  Goals were written today with the patient/caregiver and the patient has agreed to work towards these goals to improve his/her overall well-being. Patient verbalized understanding of the care plan, goals, and all of today's instructions. Encouraged patient/caregiver to communicate with his/her physician and health care team about health conditions and the treatment plan.  Provided my contact information today and encouraged patient/caregiver to call me with any questions as needed.

## 2019-11-07 ENCOUNTER — TELEPHONE (OUTPATIENT)
Dept: INTERNAL MEDICINE | Facility: CLINIC | Age: 74
End: 2019-11-07

## 2019-11-07 NOTE — TELEPHONE ENCOUNTER
----- Message from Whitley Varma sent at 11/7/2019 11:56 AM CST -----  Contact: Patient [phone]  Patient was waiting at home for the nurses visit.  Is she still coming and if so what time.    Please call and advise  Thank you

## 2019-11-11 ENCOUNTER — TELEPHONE (OUTPATIENT)
Dept: INTERNAL MEDICINE | Facility: CLINIC | Age: 74
End: 2019-11-11

## 2019-11-11 NOTE — TELEPHONE ENCOUNTER
----- Message from Portia Downs sent at 11/11/2019  9:30 AM CST -----  Contact: Patient [phone]  Patient is requesting a call about her transportation.    Please call and advise.    Thank You

## 2019-11-12 ENCOUNTER — LAB VISIT (OUTPATIENT)
Dept: LAB | Facility: HOSPITAL | Age: 74
End: 2019-11-12
Attending: INTERNAL MEDICINE
Payer: MEDICARE

## 2019-11-12 ENCOUNTER — OUTPATIENT CASE MANAGEMENT (OUTPATIENT)
Dept: ADMINISTRATIVE | Facility: OTHER | Age: 74
End: 2019-11-12

## 2019-11-12 ENCOUNTER — OFFICE VISIT (OUTPATIENT)
Dept: PRIMARY CARE CLINIC | Facility: CLINIC | Age: 74
End: 2019-11-12
Payer: MEDICARE

## 2019-11-12 ENCOUNTER — PATIENT MESSAGE (OUTPATIENT)
Dept: ADMINISTRATIVE | Facility: OTHER | Age: 74
End: 2019-11-12

## 2019-11-12 ENCOUNTER — TELEPHONE (OUTPATIENT)
Dept: PHARMACY | Facility: CLINIC | Age: 74
End: 2019-11-12

## 2019-11-12 ENCOUNTER — TELEPHONE (OUTPATIENT)
Dept: PRIMARY CARE CLINIC | Facility: CLINIC | Age: 74
End: 2019-11-12

## 2019-11-12 VITALS
SYSTOLIC BLOOD PRESSURE: 130 MMHG | HEART RATE: 63 BPM | WEIGHT: 114.5 LBS | HEIGHT: 64 IN | DIASTOLIC BLOOD PRESSURE: 80 MMHG | OXYGEN SATURATION: 92 % | BODY MASS INDEX: 19.55 KG/M2

## 2019-11-12 DIAGNOSIS — M32.9 NEUROPATHY DUE TO SLE (SYSTEMIC LUPUS ERYTHEMATOSUS): ICD-10-CM

## 2019-11-12 DIAGNOSIS — M32.14 OTHER SYSTEMIC LUPUS ERYTHEMATOSUS WITH GLOMERULAR DISEASE: Chronic | ICD-10-CM

## 2019-11-12 DIAGNOSIS — D63.1 ANEMIA OF CHRONIC RENAL FAILURE, STAGE 4 (SEVERE): ICD-10-CM

## 2019-11-12 DIAGNOSIS — K59.1 FUNCTIONAL DIARRHEA: ICD-10-CM

## 2019-11-12 DIAGNOSIS — G63 NEUROPATHY DUE TO SLE (SYSTEMIC LUPUS ERYTHEMATOSUS): ICD-10-CM

## 2019-11-12 DIAGNOSIS — E85.4 CARDIAC AMYLOIDOSIS: ICD-10-CM

## 2019-11-12 DIAGNOSIS — I43 SENILE CARDIAC AMYLOIDOSIS: ICD-10-CM

## 2019-11-12 DIAGNOSIS — N18.4 ANEMIA OF CHRONIC RENAL FAILURE, STAGE 4 (SEVERE): ICD-10-CM

## 2019-11-12 DIAGNOSIS — I43 CARDIAC AMYLOIDOSIS: ICD-10-CM

## 2019-11-12 DIAGNOSIS — E85.4 SENILE CARDIAC AMYLOIDOSIS: ICD-10-CM

## 2019-11-12 LAB
HCT VFR BLD AUTO: 38.3 % (ref 37–48.5)
HGB BLD-MCNC: 12.4 G/DL (ref 12–16)
IRON SERPL-MCNC: 58 UG/DL (ref 30–160)
SATURATED IRON: 21 % (ref 20–50)
TOTAL IRON BINDING CAPACITY: 278 UG/DL (ref 250–450)
TRANSFERRIN SERPL-MCNC: 188 MG/DL (ref 200–375)

## 2019-11-12 PROCEDURE — 3079F DIAST BP 80-89 MM HG: CPT | Mod: HCNC,CPTII,S$GLB, | Performed by: INTERNAL MEDICINE

## 2019-11-12 PROCEDURE — 3079F PR MOST RECENT DIASTOLIC BLOOD PRESSURE 80-89 MM HG: ICD-10-PCS | Mod: HCNC,CPTII,S$GLB, | Performed by: INTERNAL MEDICINE

## 2019-11-12 PROCEDURE — 83540 ASSAY OF IRON: CPT | Mod: HCNC

## 2019-11-12 PROCEDURE — 1101F PR PT FALLS ASSESS DOC 0-1 FALLS W/OUT INJ PAST YR: ICD-10-PCS | Mod: HCNC,CPTII,S$GLB, | Performed by: INTERNAL MEDICINE

## 2019-11-12 PROCEDURE — 85018 HEMOGLOBIN: CPT | Mod: HCNC

## 2019-11-12 PROCEDURE — 3075F SYST BP GE 130 - 139MM HG: CPT | Mod: HCNC,CPTII,S$GLB, | Performed by: INTERNAL MEDICINE

## 2019-11-12 PROCEDURE — 99215 PR OFFICE/OUTPT VISIT, EST, LEVL V, 40-54 MIN: ICD-10-PCS | Mod: HCNC,S$GLB,, | Performed by: INTERNAL MEDICINE

## 2019-11-12 PROCEDURE — 3075F PR MOST RECENT SYSTOLIC BLOOD PRESS GE 130-139MM HG: ICD-10-PCS | Mod: HCNC,CPTII,S$GLB, | Performed by: INTERNAL MEDICINE

## 2019-11-12 PROCEDURE — 36415 COLL VENOUS BLD VENIPUNCTURE: CPT | Mod: HCNC

## 2019-11-12 PROCEDURE — 85014 HEMATOCRIT: CPT | Mod: HCNC

## 2019-11-12 PROCEDURE — 1101F PT FALLS ASSESS-DOCD LE1/YR: CPT | Mod: HCNC,CPTII,S$GLB, | Performed by: INTERNAL MEDICINE

## 2019-11-12 PROCEDURE — 99215 OFFICE O/P EST HI 40 MIN: CPT | Mod: HCNC,S$GLB,, | Performed by: INTERNAL MEDICINE

## 2019-11-12 NOTE — TELEPHONE ENCOUNTER
Please let the patient know the following (as advised by the amyloidosis nurse). Her diarrhea is more likely due to amyloidosis and not the meds or foods that she is ingesting. The proteins from amyloidosis coat her intestines and make it where she doesn't absorb the food.    Does she want to see the GI doctor about this?    Thanks,  WENDY  ----- Message from Janeth Putnam sent at 11/12/2019 10:01 AM CST -----  Dr. Johns,  Diarrhea can be a symptom of amyloidosis, not necessarily the Vyndaqel. She can be referred to GI if it does not resolve. TTR Amyloidosis can line the intestines/colon in later stages that interferes with absorption.   Janeth  ----- Message -----  From: Amarilys Johns MD  Sent: 11/12/2019   9:51 AM CST  To: Janeth Putnam    Ms Putnam,  Ms Arnold is havign diarrhea perhaps related to the vyndaquel she is taking for her amyloidosis. Do you have any advice on how to treat this?    Thanks,  WENDY (PCP)

## 2019-11-12 NOTE — PROGRESS NOTES
Primary Care Provider Appointment    Subjective:      Patient ID: Ewelina Arnold is a 73 y.o. female with cardaic amyloidosis, HTN, HLD, barriers to care.    Chief Complaint: Follow-up    Patient's BP better controlled today. She is receiving treatment for cardiac amyloidosis (Vyndaqel). She is not participating in an organized exercise program, but she needs to! She has no transportation. Has submitted her application to [employer], but this has not been processed.    Patient is having loose stools. This is one of the top side-effects of vyndaquel. She also started nepro supplement daily (which is high glycemic index), she states this is expensive. She has history of colon cancer (colectomy before [facility]). Last scope was 2017 with end-to-end colo-rectal anastomos intact.    Receives aranesp for ACI related to CKD. Next infusion this Friday.    She is complaining of dysphagia. Is having a difficult time swallowing.      She is on Imuran for lupus. Her rash has returned today. Per documentation from Dr Ramirez when these lesions appeared previously, there is nothing that should change in her treatment for lupus.  Dr. Ramirez' response: Not really. This is part of her lupus that has improved but not resolved on azathioprine. She can expect to continue with intermittent lesions that she should treat with topical steroids. Dermatology is following her as well.   WD   Call attempted to inform patient to proceed with Imuran and continue to f/u with rhueum and derm - NA, NVM. TTN            Electronically signed by Maddi Camacho PharmD at 10/23/2019  2:03 PM     Participates in pill packs, patient is compliant. She occasionally needs her doxazosin as needed for HTN.  Adherence packed for 28 days using Dispill:      Morning card:   Aspirin 81 mg   Carvedilol 25 mg   Citalopram 20 mg (1&1/2 tabs)   Famotidine 20 mg   Levothyroxine 75 mcg   Nifedipine ER Osmotic 60 mg   Sodium Bicarb 650 mg   Vitamin D 04386 IU (Once  weekly)  Evening card:      Atorvastatin 80 mg   Carvedilol 25 mg   Nifedipine ER Osmotic 60 mg   Sodium Bicarb 650 mg      PRN:   Doxazosin 2 mg (given in a separate bottle)         Electronically signed by Amanda AlmaguerD at 10/14/2019  1:22 PM       Past Surgical History:   Procedure Laterality Date    CARDIAC CATHETERIZATION  07/27/2011    x1    CHOLECYSTECTOMY  1999    COLON SURGERY  2000 & 2003    partial removal for CA    COLONOSCOPY N/A 4/14/2016    Procedure: COLONOSCOPY;  Surgeon: Jose Steen MD;  Location: [facility] [place] (4TH FLR);  Service: Endoscopy;  Laterality: N/A;  prep 2 days prior light meals only/clear liquid day before  and 4 dulcolax tabs (5 mgs) at noon    COLONOSCOPY N/A 9/14/2017    Procedure: COLONOSCOPY;  Surgeon: Jose Steen MD;  Location: [facility] ([address] [place] [address]);  Service: Endoscopy;  Laterality: N/A;    EYE SURGERY      HAND SURGERY Bilateral 1996 & 1997    due to carpal tunnel     HERNIA REPAIR      HYSTERECTOMY  1983    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    OOPHORECTOMY      removed one    Peripheral Iridotomy both eyes  1994    laser angle correction    THYROIDECTOMY, PARTIAL  2006    to remove two nodules and one-half thyroid       Past Medical History:   Diagnosis Date    Acute hypoxemic respiratory failure 4/28/2018    Acute on chronic diastolic congestive heart failure 11/17/2017    Allergy     Anatomical narrow angle glaucoma     Anemia     States diagnosed about a month ago    Aortic atherosclerosis     Arthritis     Cataract     CHF (congestive heart failure)     Chronic kidney disease     Chronic sciatica of left side     Right lower back pain due to sciatica    Coronary artery disease     Depression     Dry eyes     Gastroenteritis, acute     GERD (gastroesophageal reflux disease)     History of colon cancer     Hyperaldosteronism     Hyperlipidemia     Hypertension     Hypothyroidism     Keloid cicatrix     Left ventricular  diastolic dysfunction with preserved systolic function     Lupus (systemic lupus erythematosus) 2012    Lupus (systemic lupus erythematosus)     Malnutrition 2017    Osteoarthritis of cervical spine 2012    Osteopenia     PAD (peripheral artery disease)     FRAN (renal artery stenosis)        Social History     Socioeconomic History    Marital status: Single     Spouse name: Not on file    Number of children: Not on file    Years of education: Not on file    Highest education level: Not on file   Occupational History     Employer: Retired    Social Needs    Financial resource strain: Not very hard    Food insecurity:     Worry: Never true     Inability: Never true    Transportation needs:     Medical: Yes     Non-medical: Yes   Tobacco Use    Smoking status: Former Smoker     Packs/day: 1.50     Years: 30.00     Pack years: 45.00     Types: Cigarettes     Start date:      Last attempt to quit: 3/13/1985     Years since quittin.6    Smokeless tobacco: Never Used   Substance and Sexual Activity    Alcohol use: No    Drug use: No    Sexual activity: Not Currently     Partners: Male     Birth control/protection: Abstinence   Lifestyle    Physical activity:     Days per week: Not on file     Minutes per session: Not on file    Stress: Not on file   Relationships    Social connections:     Talks on phone: Not on file     Gets together: Not on file     Attends Buddhist service: Not on file     Active member of club or organization: Not on file     Attends meetings of clubs or organizations: Not on file     Relationship status: Not on file   Other Topics Concern    Are you pregnant or think you may be? No    Breast-feeding No   Social History Narrative    Not on file       Review of Systems   Constitutional: Positive for activity change and fatigue. Negative for appetite change.   Respiratory: Positive for choking and shortness of breath. Negative for cough.   "  Cardiovascular: Negative for leg swelling.   Endocrine: Positive for cold intolerance.   Genitourinary: Negative for difficulty urinating and dysuria.   Skin: Positive for wound.   Neurological: Negative for dizziness and light-headedness.   Hematological: Bruises/bleeds easily.   Psychiatric/Behavioral: Positive for agitation. Negative for behavioral problems, confusion, decreased concentration and dysphoric mood.       Objective:   /80   Pulse 63   Ht 5' 4" (1.626 m)   Wt 51.9 kg (114 lb 8.5 oz)   LMP  (LMP Unknown) Comment: 99  SpO2 (!) 92%   BMI 19.66 kg/m²     Physical Exam   Constitutional: She is oriented to person, place, and time. She appears well-developed and well-nourished.   HENT:   Head: Normocephalic and atraumatic.   Neck: Normal range of motion.   Pulmonary/Chest: Effort normal.   Musculoskeletal: She exhibits deformity. She exhibits no edema.   Resolution of LE nodules   Neurological: She is alert and oriented to person, place, and time.   Skin:   Upper extremity lesions   Psychiatric: She has a normal mood and affect. Her behavior is normal. Thought content normal.   Vitals reviewed.      Lab Results   Component Value Date    WBC 4.22 10/18/2019    HGB 11.1 (L) 10/25/2019    HCT 36.5 (L) 10/25/2019     10/18/2019    CHOL 181 08/28/2018    TRIG 54 08/28/2018    HDL 66 08/28/2018    ALT 7 (L) 10/18/2019    AST 24 10/18/2019     10/18/2019    K 4.1 10/18/2019     10/18/2019    CREATININE 2.8 (H) 10/18/2019    BUN 66 (H) 10/18/2019    CO2 26 10/18/2019    TSH 1.798 01/25/2019    INR 1.0 09/05/2018    GLUF 79 12/02/2011    HGBA1C 5.2 09/25/2018             Variable home BPs      RESULTS: Reviewed labs and images today    Assessment:   73 y.o. female with multiple co-morbid illnesses here to continue work-up of chronic issues notably with cardaic amyloidosis, HTN, HLD, barriers to care.     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 Biopsy showed cutaneous involvment  · Did not tolerate plaquenil  · Completed prednisone for erythema nodosum  · May need another course PRN per Rheum  · Continue Imuran  · F/U Rheum  · PCP previously messaged Dr Ramirez about new rash (pics in note), but reassurance provided as these are "intermittent lesions that she should treat with topical steroids"         Senile cardiac amyloidosis     Per cardiology: "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"  · Continue vyndaqel  · Frequent labs  · F/U with Dr Burdick  · Advised PCP that patient needs to exercise  · OPCM assisting patient with transportation to exercise program             Oncology    Anemia of chronic renal failure, stage 4 (severe)     Followed by Nephrology, PRN iron infusions, arenesp injections  · F/U Nephrology (Dr Epperson)  · Iron infusion PRN  · Continue arenesp            GI    Functional diarrhea     Diarrhea since starting nepro and Vyndaquel  · Advised to cut back on nepro if diarrhea worsens  · PCP to message Janeth Putnam for treatments for diarrhea if needed               Health Maintenance       Date Due Completion Date    Shingles Vaccine (2 of 3) 01/10/2020 (Originally 1/9/2012) 11/14/2011    Mammogram 09/18/2020 9/18/2019    High Dose Statin 11/12/2020 11/12/2019    DEXA SCAN 01/08/2022 1/8/2019    Colonoscopy 09/14/2022 9/14/2017    Lipid Panel 08/28/2023 8/28/2018    TETANUS VACCINE 06/29/2026 6/29/2016          Follow up in about 6 weeks (around 12/24/2019). Total face-to-face time was 60 min, 50% of this was spent on counseling and coordination of care. The following issues were discussed: with " cardaic amyloidosis, HTN, HLD, barriers to care.    Amarilys Johns MD/MPH  Internal Medicine  [facility]  [phone]

## 2019-11-12 NOTE — Clinical Note
Can you help patietn find healthy meal delivery options. I like healthy course meals, it's affordable. I gave her info on that.[email or web address]

## 2019-11-12 NOTE — ASSESSMENT & PLAN NOTE
"Per cardiology: "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"  · Continue vyndaqel  · Frequent labs  · F/U with Dr Burdick  · Advised PCP that patient needs to exercise  · OPCM assisting patient with transportation to exercise program   "

## 2019-11-12 NOTE — PATIENT INSTRUCTIONS
TODAY:  - try healthy course meals (affordable, healthy and will be delivered to your house)   + [email or web address]  - consider a swallow study for the choking symptoms   + call office or message me if it worsens  - PCP messaged amyloidosis coordinator for advice on your diarrhea  - use steroid cream for your lupus rash  - use a tank top with a shelf bra instead of a tight bra

## 2019-11-12 NOTE — PROGRESS NOTES
Outpatient Care Management  Plan of Care Follow Up Visit    Patient: Ewelina Arnold  MRN: [id number]  Date of Service: 11/12/2019  Completed by: Eulalia Lynch RN  Referral Date: 09/19/2019  Program:     Reason for Visit   Patient presents with    Update Plan Of Care     11/12/19       Brief Summary:   Goals met. No further needs identified. Pt agrees with case closure. Informed pt that she can contact RN CM should future needs arise.   Updated pt via Patient Portal on pending [employer] [facility] application status .    Patient Summary     Involvement of Care:  Do I have permission to speak with other family members about your care?       Problem List     Patient Active Problem List   Diagnosis    Other forms of systemic lupus erythematosus    Coronary artery disease due to calcified coronary lesion    Post PTCA    Hypertension    High risk medication use - Both Eyes    Residual stage angle-closure glaucoma - Both Eyes    Nuclear sclerosis - Both Eyes    Thyroid nodule    Acquired hypothyroidism    Osteopenia    Insufficiency of tear film of both eyes    Aortic atherosclerosis    GERD (gastroesophageal reflux disease)    History of colon cancer    Dyslipidemia    Degeneration of lumbar or lumbosacral intervertebral disc    Tortuous aorta    CKD (chronic kidney disease), stage IV    Positive dysphotopsia    Weight loss, abnormal    Pulmonary hypertension    Secondary hyperparathyroidism of renal origin    Peripheral arterial disease    Major depressive disorder with single episode, in full remission    Cervical spine arthritis    Malnutrition of moderate degree    Bilateral carotid artery disease    Head trauma    Microcytic anemia    Aortic stenosis, moderate    Systolic congestive heart failure    Solitary kidney, acquired    Arcuate scotoma of both eyes    Dizziness    Cor pulmonale, chronic    Impaired glucose tolerance    Hyperaldosteronism    History of unilateral nephrectomy LEFT     Metabolic acidosis, normal anion gap (NAG)    Renovascular hypertension    Chronic diastolic heart failure    Senile purpura    Polypharmacy    Anemia of chronic renal failure, stage 4 (severe)    Hypovitaminosis D    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    Patient Reported Labs & Vitals:  1.  Any Patient Reported Labs & Vitals?     2.  Patient Reported Blood Pressure:     3.  Patient Reported Pulse:     4.  Patient Reported Weight (Kg):     5.  Patient Reported Blood Glucose (mg/dl):       Medical History:  Reviewed medical history with patient and/or caregiver    Social History:  Reviewed social history with patient and/or caregiver    Clinical Assessment     Reviewed and provided basic information on available community resources for mental health, transportation, wellness resources, and palliative care programs with patient and/or caregiver.    Complex Care Plan     Care plan was discussed and completed today with input from patient and/or caregiver.    Goals        Blood Pressure     Blood Pressure < 130/80 (pt-stated)      Take at least one BP reading per week at various times of the day        Diet     Maintain a low sodium diet        Exercise     Exercise at least 150 minutes per week.        Outpatient Case Management     Patient/caregiver will accept life style changes to manage CKD/Anemia prior to discharge from [facility]. -  Priority: HIGH      Overall Time to Completion  2 months from 08/26/2019    [facility] Identified Patient Barriers:  Health Literacy  -Care Plan Created  Support---Referred to [facility]   Housing--Referred to [facility]   Fall Risk  -Care Plan Created  Nutrition---Care Plan Created  Transportation---Referred to [facility]   Financial Support---Referred to [facility]       RN Identified Patient Barriers:  Housing---Referred to [facility]   Equipment/Supplies/Services---Care Plan Created  Advanced Care Planning---Referred to [facility]       Short Term Goals  Patient/caregiver will limit fluid intake to 1 milliliters per day within 2 weeks.  Interventions   · Recognize and provide educational material (BRANDON).        10/21- pt confirms max PO intake 48 oz day.    Status  · Met   10/21      Patient/caregiver will verbalize 2 food items Renal within 1 month.  Interventions   · Encourage compliance with Physician follow-ups.  · Encourage compliance with scheduled laboratory testing and procedure.  · Recognize and provide educational material (BRANDON).  · Refer to Dietician.   If needed. Pt last saw dietitian 10/2018  · Anemia management.  · Pt safety, fall prevention.  · 9/6-- educated pt in foods high in Potassium to keep limited 2* CKD IV- potatoes, bananas, tomatoes. Caution advised since pt is taking Lasix that depletes body of K+. Recommended that pt seek specific guidelines from her PCP and/or Nephro.                  10/21--Provided pt with education on foods good to eat in renal diet:   Fruits : Apples, without skin; Apricot; Blueberries*; Grapes, red or green; Peach; Pears,...  Vegetables : Asparagus*; Cabbage, red;                             Cauliflower; Celery; Garlic; Lettuce,...  Protein : Fish*; Chicken; Tempeh*; Tofu*.  Miscellaneous : Carob (good alternative to                                       chocolate); Flaxseed Oil*; Hummus; Popcorn,   In summary: fruits,  veggies, low-fat dairy products, whole grains, fish, poultry, beans, seeds, and nuts. Its low in sodium, sugars and sweets, fats, and red meats.    10/21-- Mailed above food recommendations.        Status  · Met  10/21           Clinical Reference Documents Added to Patient Instructions       Document    CHRONIC KIDNEY DISEASE (CKD) (ENGLISH)    CHRONIC KIDNEY DISEASE, DIET FOR (ENGLISH)    FALL DUE TO DIZZINESS, WEAKNESS, OR LOSS OF BALANCE (ENGLISH)    FALLS, PREVENTING, MAKING CHANGES IN YOUR LIVING SPACE (ENGLISH)  ANEMIA              Patient/caregiver will be able to name all current medications as ordered by Physician prior to discharge from [facility]. - Priority: High      Overall Time to Completion  2 months from 08/26/2019     [facility] Identified Patient Barriers:  Health Literacy  -Care Plan Created  Support---Referred to [facility]   Housing--Referred to [facility]   Fall Risk  -Care Plan Created  Nutrition---Care Plan Created  Transportation---Referred to [facility]   Financial Support---Referred to [facility]       RN Identified Patient Barriers:  Housing---Referred to [facility]   Equipment/Supplies/Services---Care Plan Created  Advanced Care Planning---Referred to [facility]       Patient/caregiver will verbalize med rec clarifications per physician  Lasix, Estrogen and Imuran  within 3 weeks.  Interventions   · Assess for retention of the signs and symptoms of disease specific exacerbation.  · Complete medication reconciliation.  · Empower patient/caregiver to discuss treatment plan with Physician/care team.  · Encourage compliance with Physician follow-ups.  · Encourage Medication Compliance.  · 8/26- In basket message sent to Dr Johns, PCP regarding dose/freq of Lasix (pt taking 40 mg BID) and the use of Estrogen listed in Epic.   · 8/26- In basket message sent to Dr. Luciano Ramirez, [facility] regarding Imuran dose and present itchy rash-- pt's response to Imuran  ?  · 8/27--Med discrepancies clarified with responses from [facility] and PCP.   ·            Status  · MET 8/27       Clinical Reference Documents Added to Patient Instructions       Document    CHRONIC KIDNEY DISEASE (CKD) (ENGLISH)    CHRONIC KIDNEY DISEASE, DIET FOR (ENGLISH)    FALL DUE TO DIZZINESS, WEAKNESS, OR LOSS OF BALANCE (ENGLISH)    FALLS, PREVENTING, MAKING CHANGES IN YOUR LIVING SPACE (ENGLISH)  ANEMIA              Patient/caregiver will have an action plan in place to manage and prevent complications of CHF prior to discharge from \[facility]\"". - Priority: HIGH      Overall Time to Completion  2 months from 08/26/2019      \[facility]\"" Identified Patient Barriers:  Health Literacy  -Care Plan Created  Support---Referred to [facility]   Housing--Referred to [facility]   Fall Risk  -Care Plan Created  Nutrition---Care Plan Created  Transportation---Referred to \[facility]\""   Financial Support---Referred to \[facility]\""       RN Identified Patient Barriers:  Housing---Referred to \[facility]\""   Equipment/Supplies/Services---Care Plan Created  Advanced Care Planning---Referred to \[facility]\""         Short Term Goals  Patient/caregiver will limit fluid intake to 1 milliliters per day within 2 weeks.  Interventions   · Empower patient/caregiver to discuss treatment plan with Physician/care team.  · Recognize and provide educational material (BRANDON).    10/21-- Pt confirms following a restricted PO fluid intake of  (6) 8 oz/day.      Status  · Met  10/21      Patient/caregiver will measure and record the weight 1 times per day for Now.  Interventions   · Assess for availability of working scale in home setting.  · Assess for retention of the signs and symptoms of disease specific exacerbation.     Status  · Met  8/26      Patient/caregiver will notify doctor if patient gains more than 3 pounds in one day or 5 pounds in one week within 1 month.  Interventions   · Assess for retention of the  signs and symptoms of disease specific exacerbation.  · Collaborate with Physician as appropriate to meet patient needs.  · Encourage compliance with Physician follow-ups.  · Encourage Dietary Compliance.  · Encourage Medication Compliance.  9/6--Restated PCP's guidelines for taking Lasix 40 mg BID & PRN, with wt goal 109 -111 lbs. Hold Lasix dose for wts <109.   9/6--Educated pt in foods high in Potassium to keep limited 2* CKD IV- Krames CKD Diet---ie  Milk, yogurt, cheese, dried beans, potatoes, bananas, tomatoes, cantalope, honeydew melon, dried fruits & nuts.   9/6--Caution advised since pt is taking Lasix that depletes body of K+. Recommended that pt seek specific guidelines from her PCP and/or Nephro.    Status  · Met   9/6      Patient/caregiver will verbalize 2 food items Low Sodium within 2 weeks.  Interventions   · Encourage Dietary Compliance.  · Review eating/nutrition habits.                10/21- Pt states eating fresh/frozen fruits and vegs, always checks the food labels for sodium content .   Status  · Met  10/21      Patient/caregiver will verbalize 2 strategies to get adequate sleep and rest, proper breathing techniques and maximize safety within 1 month.  Interventions   · Assess for retention of the signs and symptoms of disease specific exacerbation.  · Assess patient's ability to perform ADLs.   · Conserve energy.   · Pt safety, fall prevention  · 10/21- Pt reports getting sleep/rest that is interrupted by having to go to bathroom 2* diuretic effects.      Status  · Met  10/21      Patient/caregiver will verbalize 2 ways of preventing complications due to disease process within 1 month.  Interventions   · Encourage compliance with Physician follow-ups.  · Encourage compliance with preventive screenings.  · Encourage compliance with scheduled laboratory testing and procedure.  · Encourage Dietary Compliance.  · Encourage Exercise.  · Encourage Medication Compliance.  · Provide contact  information for [facility] contact information.  · Provide contact information for Outpatient Case Management contact information.  · Provide contact information for Physician office phone number.   · 11/12-- pt remains adherent to medical regimen and appts and is knowledgeable of how to contact her physicians by phone or via patient portal.      Status  · Met   11/12           Clinical Reference Documents Added to Patient Instructions       Document    CHRONIC KIDNEY DISEASE (CKD) (ENGLISH)    CHRONIC KIDNEY DISEASE, DIET FOR (ENGLISH)    FALL DUE TO DIZZINESS, WEAKNESS, OR LOSS OF BALANCE (ENGLISH)    FALLS, PREVENTING, MAKING CHANGES IN YOUR LIVING SPACE (ENGLISH)  ANEMIA              Patient/caregiver will have knowledge of resources available in order to obtain the services that are needed prior to discharge from [facility]. - Priority: HIGH      Overall Time to Completion  2 months from 08/26/2019    [facility] Identified Patient Barriers:  Health Literacy  -Care Plan Created  Support---Referred to [facility]   Housing--Referred to [facility]   Fall Risk  -Care Plan Created  Nutrition---Care Plan Created  Transportation---Referred to [facility]   Financial Support---Referred to [facility]       RN Identified Patient Barriers:  Housing---Referred to [facility]   Equipment/Supplies/Services---Care Plan Created  Advanced Care Planning---Referred to [facility]         Short Term Goals  Patient/caregiver will have contact information for identified community resources IE:[facility]  for follow-up within 1 month.  Interventions   · Facilitate needed DME.  · Refer to Community Resource: [facility]  · Refer to Outpatient Case Management Social Worker.         F/u on pt's request for scooter.         Verify pt enrolled in the [employer] HG plan with added benefits as it appears pt is. Review  for card photo.        9/6--Pt declines wish to pursue  cristiana for now. If anything, she would be interested in info on community assist with home ramp installations. Will advise [facility], Adelina Lucio.   9/6--- Collaborated with [facility] HENNY-- referral redirected from [facility] Mark ENRIQUEZ.  9/16---Care coordination for Nutrition appt for CKD meal planning.    Via Patient Portal provided pt with the [employer] Website:  [email or web address]  And provided the [employer] [facility] contact phone # that pt requested (and RN ELIAS could not locate during phone encounter).  9/16--Initiated [facility] [facility] Application with pt to start process for pt.  10/21-- Pt still waiting to hear back from [employer] re application. Called RTA -informed there is no record of pt's application on file.   Collaborated with [facility] HENNY.   10/22-- Collaborated with [facility] HENNY- who also was told today that mailed RTA application was never received. LMSW copied original however only original accepted. [facility] plans to meet with pt at time of appt 10/25 for pt signature/ PCP signature and hand delivery application to RTA office. Informed pt of plan and to expect to be contacted by [facility]ENNY .   11/12--Contacted [employer] (TEL: [phone]). Pt's application is being processed still-- and pt can expect a letter in the mail regarding her acceptance within 1-2 wks.   11/12--Entered this response to application status into pt's Patient Portal-- pt was found to be sleeping when contacted by this RN CM today.      Status  · Met   10/22, 11/12           Clinical Reference Documents Added to Patient Instructions       Document    CHRONIC KIDNEY DISEASE (CKD) (ENGLISH)    CHRONIC KIDNEY DISEASE, DIET FOR (ENGLISH)    FALL DUE TO DIZZINESS, WEAKNESS, OR LOSS OF BALANCE (ENGLISH)    FALLS, PREVENTING, MAKING CHANGES IN YOUR LIVING SPACE (ENGLISH)  ANEMIA                   Patient Instructions     Instructions were provided via the [employer] patient resources and are available for the patient to view on the patient portal.    Next  Steps:   Case closed.     No follow-ups on file.      Todays [facility] Self-Management Care Plan was developed with the patients/caregivers input and was based on identified barriers from todays assessment.  Goals were written today with the patient/caregiver and the patient has agreed to work towards these goals to improve his/her overall well-being. Patient verbalized understanding of the care plan, goals, and all of today's instructions. Encouraged patient/caregiver to communicate with his/her physician and health care team about health conditions and the treatment plan.  Provided my contact information today and encouraged patient/caregiver to call me with any questions as needed.

## 2019-11-12 NOTE — ASSESSMENT & PLAN NOTE
Diarrhea since starting nepro and Vyndaquel  · Advised to cut back on nepro if diarrhea worsens  · PCP to message Janeth Putnam for treatments for diarrhea if needed

## 2019-11-12 NOTE — ASSESSMENT & PLAN NOTE
Followed by Nephrology, PRN iron infusions, arenesp injections  · F/U Nephrology (Dr Epperson)  · Iron infusion PRN  · Continue arenesp

## 2019-11-12 NOTE — Clinical Note
Ms Indy,Ms Clayton is havign diarrhea perhaps related to the vyndaquel she is taking for her amyloidosis. Do you have any advice on how to treat this?Thanks,WENDY (PCP)

## 2019-11-12 NOTE — ASSESSMENT & PLAN NOTE
"Diagnosed in 1990s, pos CARYN, SSB. Biopsy showed cutaneous involvment  · Did not tolerate plaquenil  · Completed prednisone for erythema nodosum  · May need another course PRN per Rheum  · Continue Imuran  · F/U Rheum  · PCP previously messaged Dr Ramirez about new rash (pics in note), but reassurance provided as these are "intermittent lesions that she should treat with topical steroids"  "

## 2019-11-13 ENCOUNTER — TELEPHONE (OUTPATIENT)
Dept: PRIMARY CARE CLINIC | Facility: CLINIC | Age: 74
End: 2019-11-13

## 2019-11-13 DIAGNOSIS — R19.7 DIARRHEA, UNSPECIFIED TYPE: Primary | ICD-10-CM

## 2019-11-13 NOTE — PROGRESS NOTES
Adherence packed for 28 days using Dispill:     Morning card:   Aspirin 81 mg   Carvedilol 25 mg   Citalopram 20 mg (1&1/2 tabs)   Famotidine 20 mg   Levothyroxine 75 mcg   Nifedipine ER Osmotic 60 mg   Sodium Bicarb 650 mg   Vitamin D 35317 IU (Once weekly)     Evening card:   Atorvastatin 80 mg   Carvedilol 25 mg   Nifedipine ER Osmotic 60 mg   Sodium Bicarb 650 mg       PRN:   Doxazosin 2 mg (given in a separate bottle)

## 2019-11-13 NOTE — TELEPHONE ENCOUNTER
GI referral placed, please schedule in the new year.    Thanks,  WENDY Burgess MA  You 5 hours ago (9:14 AM)      Update spoke w/ pt she states she now wants to see the GI doctor but please make it after the new year can you place order

## 2019-11-14 ENCOUNTER — TELEPHONE (OUTPATIENT)
Dept: INFECTIOUS DISEASES | Facility: HOSPITAL | Age: 74
End: 2019-11-14

## 2019-11-14 NOTE — TELEPHONE ENCOUNTER
Hgb 12.4;Hct 38.3  Dose held  PER PROTOCOL FORM DROD-188 .2 week appt se for lab draw and patient will see me in 3 weeks.  Dr Zhou diaz. Patients Iron sats have improved to 21%.    Gfr 18.6/stage 4

## 2019-11-22 ENCOUNTER — TELEPHONE (OUTPATIENT)
Dept: PHARMACY | Facility: CLINIC | Age: 74
End: 2019-11-22

## 2019-11-22 ENCOUNTER — TELEPHONE (OUTPATIENT)
Dept: INTERNAL MEDICINE | Facility: CLINIC | Age: 74
End: 2019-11-22

## 2019-11-22 NOTE — TELEPHONE ENCOUNTER
----- Message from Deanna Maradiaga sent at 11/22/2019  8:51 AM CST -----  Contact: self/ [phone]  Please reactivate patient [facility] weight program?

## 2019-11-25 ENCOUNTER — PATIENT MESSAGE (OUTPATIENT)
Dept: PRIMARY CARE CLINIC | Facility: CLINIC | Age: 74
End: 2019-11-25

## 2019-11-27 ENCOUNTER — LAB VISIT (OUTPATIENT)
Dept: LAB | Facility: HOSPITAL | Age: 74
End: 2019-11-27
Attending: INTERNAL MEDICINE
Payer: MEDICARE

## 2019-11-27 DIAGNOSIS — N18.5 ANEMIA IN STAGE 5 CHRONIC KIDNEY DISEASE: ICD-10-CM

## 2019-11-27 DIAGNOSIS — D63.1 ANEMIA IN STAGE 5 CHRONIC KIDNEY DISEASE: ICD-10-CM

## 2019-11-27 DIAGNOSIS — N18.4 ANEMIA OF CHRONIC RENAL FAILURE, STAGE 4 (SEVERE): ICD-10-CM

## 2019-11-27 DIAGNOSIS — D63.1 ANEMIA OF CHRONIC RENAL FAILURE, STAGE 4 (SEVERE): ICD-10-CM

## 2019-11-27 LAB
HCT VFR BLD AUTO: 33.8 % (ref 37–48.5)
HGB BLD-MCNC: 10 G/DL (ref 12–16)
IRON SERPL-MCNC: 67 UG/DL (ref 30–160)
SATURATED IRON: 24 % (ref 20–50)
TOTAL IRON BINDING CAPACITY: 283 UG/DL (ref 250–450)
TRANSFERRIN SERPL-MCNC: 191 MG/DL (ref 200–375)

## 2019-11-27 PROCEDURE — 85018 HEMOGLOBIN: CPT | Mod: HCNC

## 2019-11-27 PROCEDURE — 36415 COLL VENOUS BLD VENIPUNCTURE: CPT | Mod: HCNC,PN

## 2019-11-27 PROCEDURE — 85014 HEMATOCRIT: CPT | Mod: HCNC

## 2019-11-27 PROCEDURE — 83540 ASSAY OF IRON: CPT | Mod: HCNC

## 2019-12-02 ENCOUNTER — OFFICE VISIT (OUTPATIENT)
Dept: HOME HEALTH SERVICES | Facility: CLINIC | Age: 74
End: 2019-12-02
Payer: MEDICARE

## 2019-12-02 VITALS
DIASTOLIC BLOOD PRESSURE: 73 MMHG | HEART RATE: 66 BPM | BODY MASS INDEX: 18.78 KG/M2 | WEIGHT: 110 LBS | SYSTOLIC BLOOD PRESSURE: 154 MMHG | HEIGHT: 64 IN

## 2019-12-02 DIAGNOSIS — F32.5 MAJOR DEPRESSIVE DISORDER WITH SINGLE EPISODE, IN FULL REMISSION: ICD-10-CM

## 2019-12-02 DIAGNOSIS — I70.0 AORTIC ATHEROSCLEROSIS: ICD-10-CM

## 2019-12-02 DIAGNOSIS — Z85.038 HISTORY OF COLON CANCER: ICD-10-CM

## 2019-12-02 DIAGNOSIS — I65.23 BILATERAL CAROTID ARTERY STENOSIS: ICD-10-CM

## 2019-12-02 DIAGNOSIS — H40.243 RESIDUAL STAGE OF ANGLE-CLOSURE GLAUCOMA OF BOTH EYES: ICD-10-CM

## 2019-12-02 DIAGNOSIS — I13.0 CARDIORENAL SYNDROME, STAGE 1-4 OR UNSPECIFIED CHRONIC KIDNEY DISEASE, WITH HEART FAILURE: ICD-10-CM

## 2019-12-02 DIAGNOSIS — D63.1 ANEMIA OF CHRONIC RENAL FAILURE, STAGE 4 (SEVERE): ICD-10-CM

## 2019-12-02 DIAGNOSIS — H53.8 POSITIVE DYSPHOTOPSIA: ICD-10-CM

## 2019-12-02 DIAGNOSIS — E44.0 MALNUTRITION OF MODERATE DEGREE: ICD-10-CM

## 2019-12-02 DIAGNOSIS — N18.4 CKD (CHRONIC KIDNEY DISEASE), STAGE IV: ICD-10-CM

## 2019-12-02 DIAGNOSIS — M32.14 OTHER SYSTEMIC LUPUS ERYTHEMATOSUS WITH GLOMERULAR DISEASE: Chronic | ICD-10-CM

## 2019-12-02 DIAGNOSIS — E03.9 ACQUIRED HYPOTHYROIDISM: ICD-10-CM

## 2019-12-02 DIAGNOSIS — I35.0 SEVERE CALCIFIC AORTIC STENOSIS: ICD-10-CM

## 2019-12-02 DIAGNOSIS — I50.22 CHRONIC SYSTOLIC CONGESTIVE HEART FAILURE: ICD-10-CM

## 2019-12-02 DIAGNOSIS — Z00.00 ENCOUNTER FOR PREVENTIVE HEALTH EXAMINATION: Primary | ICD-10-CM

## 2019-12-02 DIAGNOSIS — I27.81 COR PULMONALE, CHRONIC: ICD-10-CM

## 2019-12-02 DIAGNOSIS — I10 ESSENTIAL HYPERTENSION: ICD-10-CM

## 2019-12-02 DIAGNOSIS — I15.0 RENOVASCULAR HYPERTENSION: ICD-10-CM

## 2019-12-02 DIAGNOSIS — M32.9 NEUROPATHY DUE TO SLE (SYSTEMIC LUPUS ERYTHEMATOSUS): ICD-10-CM

## 2019-12-02 DIAGNOSIS — E85.4 SENILE CARDIAC AMYLOIDOSIS: ICD-10-CM

## 2019-12-02 DIAGNOSIS — E85.82 WILD-TYPE TRANSTHYRETIN-RELATED (ATTR) AMYLOIDOSIS: ICD-10-CM

## 2019-12-02 DIAGNOSIS — K21.9 GASTROESOPHAGEAL REFLUX DISEASE, ESOPHAGITIS PRESENCE NOT SPECIFIED: ICD-10-CM

## 2019-12-02 DIAGNOSIS — I43 SENILE CARDIAC AMYLOIDOSIS: ICD-10-CM

## 2019-12-02 DIAGNOSIS — H04.123 INSUFFICIENCY OF TEAR FILM OF BOTH EYES: ICD-10-CM

## 2019-12-02 DIAGNOSIS — I35.0 AORTIC STENOSIS, MODERATE: ICD-10-CM

## 2019-12-02 DIAGNOSIS — Z79.899 POLYPHARMACY: ICD-10-CM

## 2019-12-02 DIAGNOSIS — I27.20 PULMONARY HYPERTENSION: ICD-10-CM

## 2019-12-02 DIAGNOSIS — H59.099 POSITIVE DYSPHOTOPSIA: ICD-10-CM

## 2019-12-02 DIAGNOSIS — N25.81 SECONDARY HYPERPARATHYROIDISM OF RENAL ORIGIN: ICD-10-CM

## 2019-12-02 DIAGNOSIS — H25.13 NUCLEAR SCLEROSIS OF BOTH EYES: ICD-10-CM

## 2019-12-02 DIAGNOSIS — N18.4 ANEMIA OF CHRONIC RENAL FAILURE, STAGE 4 (SEVERE): ICD-10-CM

## 2019-12-02 DIAGNOSIS — I73.9 PERIPHERAL ARTERIAL DISEASE: ICD-10-CM

## 2019-12-02 DIAGNOSIS — D50.9 MICROCYTIC ANEMIA: ICD-10-CM

## 2019-12-02 DIAGNOSIS — I50.32 CHRONIC DIASTOLIC HEART FAILURE: ICD-10-CM

## 2019-12-02 DIAGNOSIS — K59.1 FUNCTIONAL DIARRHEA: ICD-10-CM

## 2019-12-02 DIAGNOSIS — Z90.5 SOLITARY KIDNEY, ACQUIRED: ICD-10-CM

## 2019-12-02 DIAGNOSIS — G63 NEUROPATHY DUE TO SLE (SYSTEMIC LUPUS ERYTHEMATOSUS): ICD-10-CM

## 2019-12-02 DIAGNOSIS — E78.5 DYSLIPIDEMIA: ICD-10-CM

## 2019-12-02 DIAGNOSIS — I77.1 TORTUOUS AORTA: ICD-10-CM

## 2019-12-02 DIAGNOSIS — Z90.5 HISTORY OF UNILATERAL NEPHRECTOMY: ICD-10-CM

## 2019-12-02 DIAGNOSIS — I51.7 LVH (LEFT VENTRICULAR HYPERTROPHY): ICD-10-CM

## 2019-12-02 DIAGNOSIS — M51.37 DEGENERATION OF LUMBAR OR LUMBOSACRAL INTERVERTEBRAL DISC: ICD-10-CM

## 2019-12-02 PROBLEM — S09.90XA HEAD TRAUMA: Status: RESOLVED | Noted: 2017-09-19 | Resolved: 2019-12-02

## 2019-12-02 PROBLEM — E87.1 HYPONATREMIA: Status: RESOLVED | Noted: 2018-09-28 | Resolved: 2019-12-02

## 2019-12-02 PROBLEM — E87.20 METABOLIC ACIDOSIS, NORMAL ANION GAP (NAG): Status: RESOLVED | Noted: 2018-04-03 | Resolved: 2019-12-02

## 2019-12-02 PROCEDURE — G0439 PPPS, SUBSEQ VISIT: HCPCS | Mod: S$GLB,,, | Performed by: NURSE PRACTITIONER

## 2019-12-02 PROCEDURE — 3077F PR MOST RECENT SYSTOLIC BLOOD PRESSURE >= 140 MM HG: ICD-10-PCS | Mod: CPTII,S$GLB,, | Performed by: NURSE PRACTITIONER

## 2019-12-02 PROCEDURE — 3078F PR MOST RECENT DIASTOLIC BLOOD PRESSURE < 80 MM HG: ICD-10-PCS | Mod: CPTII,S$GLB,, | Performed by: NURSE PRACTITIONER

## 2019-12-02 PROCEDURE — 3078F DIAST BP <80 MM HG: CPT | Mod: CPTII,S$GLB,, | Performed by: NURSE PRACTITIONER

## 2019-12-02 PROCEDURE — G0439 PR MEDICARE ANNUAL WELLNESS SUBSEQUENT VISIT: ICD-10-PCS | Mod: S$GLB,,, | Performed by: NURSE PRACTITIONER

## 2019-12-02 PROCEDURE — 3077F SYST BP >= 140 MM HG: CPT | Mod: CPTII,S$GLB,, | Performed by: NURSE PRACTITIONER

## 2019-12-02 NOTE — PATIENT INSTRUCTIONS
Counseling and Referral of Other Preventative  (Italic type indicates deductible and co-insurance are waived)    Patient Name: Ewelina Arnold  Today's Date: 12/2/2019    Health Maintenance       Date Due Completion Date    Shingles Vaccine (2 of 3) 01/10/2020 (Originally 1/9/2012) 11/14/2011    Mammogram 09/18/2020 9/18/2019    High Dose Statin 12/02/2020 12/2/2019    DEXA SCAN 01/08/2022 1/8/2019    Colonoscopy 09/14/2022 9/14/2017    Lipid Panel 08/28/2023 8/28/2018    TETANUS VACCINE 06/29/2026 6/29/2016        No orders of the defined types were placed in this encounter.    The following information is provided to all patients.  This information is to help you find resources for any of the problems found today that may be affecting your health:                Living healthy guide: www.[facility].[place].gov      Understanding Diabetes: www.diabetes.org      Eating healthy: www.cdc.gov/healthyweight      [facility] home safety checklist: www.cdc.gov/steadi/patient.html      Agency on Aging: www.goea.[place].[facility]      Alcoholics anonymous (AA): www.aa.org      Physical Activity: www.lolis.nih.gov/rj3hojx      Tobacco use: www.quitwithusla.org

## 2019-12-02 NOTE — PROGRESS NOTES
"Ewelina Arnold presented for a  Medicare AWV and comprehensive Health Risk Assessment today. The following components were reviewed and updated:    · Medical history  · Family History  · Social history  · Allergies and Current Medications  · Health Risk Assessment  · Health Maintenance  · Care Team     ** See Completed Assessments for Annual Wellness Visit within the encounter summary.**       The following assessments were completed:  · Living Situation  · CAGE  · Depression Screening  · Timed Get Up and Go  · Whisper Test  · Cognitive Function Screening  · Nutrition Screening  · ADL Screening  · PAQ Screening    Vitals:    12/02/19 0941   BP: (!) 154/73   Pulse: 66   Weight: 49.9 kg (110 lb)   Height: 5' 4" (1.626 m)     Body mass index is 18.88 kg/m².  Physical Exam   Constitutional: She is oriented to person, place, and time. She appears well-developed.   HENT:   Head: Normocephalic and atraumatic.   Eyes: EOM are normal.   Neck: Normal range of motion.   Cardiovascular: Normal rate and regular rhythm.   Murmur heard.  Pulmonary/Chest: Effort normal and breath sounds normal. No respiratory distress.   Abdominal: Soft. Bowel sounds are normal. She exhibits no distension.   Musculoskeletal: Normal range of motion. She exhibits no edema.   Neurological: She is alert and oriented to person, place, and time.   Skin: Skin is warm and dry. Lesion (LUE lesions) noted.   Psychiatric: She has a normal mood and affect. Her behavior is normal.         Diagnoses and health risks identified today and associated recommendations/orders:    1. Encounter for preventive health examination  Assessment completed. Preventive measures reviewed with patient.    2. Degeneration of lumbar or lumbosacral intervertebral disc  Stable, followed by PCP.    3. 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    4. Major depressive disorder with single episode, in full remission  Stable, followed by " PCP.    5. Residual stage of angle-closure glaucoma of both eyes  Stable, followed by Optometry.    6. Nuclear sclerosis of both eyes  Stable, followed by Optometry.    7. Insufficiency of tear film of both eyes  Stable, followed by Optometry.    8. Positive dysphotopsia  Stable, followed by Optometry.    9. Pulmonary hypertension  Stable, followed by Cardiology.    10. Essential hypertension  Stable, followed by PCP.    11. Aortic atherosclerosis  Stable, followed by Cardiology.    12. Dyslipidemia  Stable, followed by PCP.    13. Tortuous aorta  Stable, followed by Cardiology.    14. Bilateral carotid artery stenosis  Stable, followed by Cardiology.    15. Chronic diastolic heart failure  Stable, followed by Cardiology.    16. Renovascular hypertension  Stable, followed by Cardiology.    17. Cor pulmonale, chronic  Stable, followed by Cardiology.    18. Chronic systolic congestive heart failure  Stable, followed by Cardiology.    19. Peripheral arterial disease  Stable, followed by Cardiology.    20. Aortic stenosis, moderate  Stable, followed by Cardiology.    21. Cardiorenal syndrome, stage 1-4 or unspecified chronic kidney disease, with heart failure  Stable, followed by Cardiology and Nephrology.    22. Severe calcific aortic stenosis  Stable, followed by Cardiology.    23. LVH (left ventricular hypertrophy)  Stable, followed by Cardiology.    24. CKD (chronic kidney disease), stage IV  Stable, followed by Nephrology.    25. Solitary kidney, acquired  Stable, followed by Nephrology.    26. History of unilateral nephrectomy LEFT  Stable, followed by Nephrology.    27. Other systemic lupus erythematosus with glomerular disease  Stable, followed by Rheumatology.    28. Senile cardiac amyloidosis  Stable, followed by Cardiology.    29. Wild-type transthyretin-related (ATTR) amyloidosis  Stable, followed by Cardiology.    30. History of colon cancer  Stable, followed by PCP.    31. Microcytic anemia  Stable,  followed by PCP.    32. Anemia of chronic renal failure, stage 4 (severe)  Stable, followed by PCP.    33. Acquired hypothyroidism  Stable, followed by PCP.    34. Secondary hyperparathyroidism of renal origin  Stable, followed by Nephrology.    35. Malnutrition of moderate degree  Stable, followed by PCP.    36. Gastroesophageal reflux disease, esophagitis presence not specified  Stable, followed by PCP.    37. Functional diarrhea  Stable, followed by PCP.    38. Polypharmacy  Stable, followed by PCP.    Provided Ewelina with a 5-10 year written screening schedule and personal prevention plan. Recommendations were developed using the USPSTF age appropriate recommendations. Education, counseling, and referrals were provided as needed. After Visit Summary printed and given to patient which includes a list of additional screenings\tests needed.    No follow-ups on file.    Jeane Vasquez NP  I offered to discuss end of life issues, including information on how to make advance directives that the patient could use to name someone who would make medical decisions on their behalf if they became too ill to make themselves.    ___Patient declined  _X_Patient is interested, I provided paper work and offered to discuss.

## 2019-12-03 ENCOUNTER — INFUSION (OUTPATIENT)
Dept: INFECTIOUS DISEASES | Facility: HOSPITAL | Age: 74
End: 2019-12-03
Attending: INTERNAL MEDICINE
Payer: MEDICARE

## 2019-12-03 VITALS
HEIGHT: 64 IN | DIASTOLIC BLOOD PRESSURE: 77 MMHG | BODY MASS INDEX: 19.35 KG/M2 | WEIGHT: 113.31 LBS | SYSTOLIC BLOOD PRESSURE: 190 MMHG

## 2019-12-03 NOTE — PROGRESS NOTES
Hgb 10.0;Hct 33.8    Dose held due to elevated blood pressure. Patient waited a while and it just got higher.  We rescheduled her for thurs to get injection. She is already recording pressures for physician. Dr Epperson notified.    Gfr 18.6/stage 4

## 2019-12-04 ENCOUNTER — PATIENT MESSAGE (OUTPATIENT)
Dept: PRIMARY CARE CLINIC | Facility: CLINIC | Age: 74
End: 2019-12-04

## 2019-12-05 ENCOUNTER — INFUSION (OUTPATIENT)
Dept: INFECTIOUS DISEASES | Facility: HOSPITAL | Age: 74
End: 2019-12-05
Attending: INTERNAL MEDICINE
Payer: MEDICARE

## 2019-12-05 ENCOUNTER — PATIENT MESSAGE (OUTPATIENT)
Dept: PRIMARY CARE CLINIC | Facility: CLINIC | Age: 74
End: 2019-12-05

## 2019-12-05 VITALS — DIASTOLIC BLOOD PRESSURE: 56 MMHG | SYSTOLIC BLOOD PRESSURE: 136 MMHG

## 2019-12-05 DIAGNOSIS — N18.4 ANEMIA OF CHRONIC RENAL FAILURE, STAGE 4 (SEVERE): Primary | ICD-10-CM

## 2019-12-05 DIAGNOSIS — D63.1 ANEMIA OF CHRONIC RENAL FAILURE, STAGE 4 (SEVERE): Primary | ICD-10-CM

## 2019-12-05 DIAGNOSIS — E55.9 HYPOVITAMINOSIS D: ICD-10-CM

## 2019-12-05 PROCEDURE — 63600175 PHARM REV CODE 636 W HCPCS: Mod: JG,HCNC,EC | Performed by: INTERNAL MEDICINE

## 2019-12-05 PROCEDURE — 96372 THER/PROPH/DIAG INJ SC/IM: CPT | Mod: HCNC

## 2019-12-05 RX ORDER — ERGOCALCIFEROL 1.25 MG/1
50000 CAPSULE ORAL
Qty: 12 CAPSULE | Refills: 3 | Status: SHIPPED | OUTPATIENT
Start: 2019-12-05

## 2019-12-05 RX ADMIN — DARBEPOETIN ALFA 100 MCG: 100 INJECTION, SOLUTION INTRAVENOUS; SUBCUTANEOUS at 10:12

## 2019-12-05 NOTE — PROGRESS NOTES
Adherence packed for 28 days using Dispill:     Morning card:   Aspirin 81 mg   Carvedilol 25 mg   Citalopram 20 mg (1&1/2 tabs)   Famotidine 20 mg   Levothyroxine 75 mcg   Nifedipine ER Osmotic 60 mg   Sodium Bicarb 650 mg   Vitamin D 44134 IU (Once weekly)     Evening card:   Atorvastatin 80 mg   Carvedilol 25 mg   Doxazosin 2 mg  Nifedipine ER Osmotic 60 mg   Sodium Bicarb 650 mg

## 2019-12-05 NOTE — PROGRESS NOTES
Hgb 10.0;Hct 33.8    No dose change  PER PROTOCOL FORM DROD-188. Aranesp 100 mcg given and 3 week return appt set.     Gfr 18.6/stage 4

## 2019-12-09 ENCOUNTER — TELEPHONE (OUTPATIENT)
Dept: PHARMACY | Facility: CLINIC | Age: 74
End: 2019-12-09

## 2019-12-09 DIAGNOSIS — E85.4 CARDIAC AMYLOIDOSIS: ICD-10-CM

## 2019-12-09 DIAGNOSIS — I43 CARDIAC AMYLOIDOSIS: ICD-10-CM

## 2019-12-09 NOTE — TELEPHONE ENCOUNTER
Azathioprine refill confirmed and reassessment complete. We will ship Azathioprine refill on 12/10 via fedex to arrive on . $3.40 copay- 004. Confirmed 2 patient identifiers - name and . Therapy appropriate.      Patient has 10 doses of azathioprine remaining and takes one 50 mg tablet daily.  Patient reports they are not having any side effects so far. No missed doses, no new medications, no new allergies or health conditions reported at this time. Patient counseled on importance of maintaining adherence and keeping lab appointments which were scheduled. All questions answered and addressed to patients satisfaction. Advised to call [facility] and provider if any issues arise.  Pt verbalized understanding.

## 2019-12-13 DIAGNOSIS — E03.9 HYPOTHYROIDISM, UNSPECIFIED TYPE: ICD-10-CM

## 2019-12-16 NOTE — PROGRESS NOTES
Refill Authorization Note     is requesting a refill authorization.    Brief assessment and rationale for refill: REVIEW: abnormal labs  Name and strength of medication: FAMOTIDINE 20 MG Tablet // levothyroxine (SYNTHROID) 75 MCG tablet       Medication Therapy Plan: SCr>1.4;                              Comments:   Requested Prescriptions   Pending Prescriptions Disp Refills    levothyroxine (SYNTHROID) 75 MCG tablet [Pharmacy Med Name: LEVOTHYROXINE SODIUM 75 MCG Tablet] 90 tablet 0       Sig: TAKE 1 TABLET (75 MCG TOTAL) BY MOUTH BEFORE BREAKFAST    Last Thyroid 12 months or within 3 months of dosage adjustment   Lab Results   Component Value Date    TSH 1.798 01/25/2019    TSH 0.954 09/05/2018    TSH 0.782 08/28/2018    Lab Results   Component Value Date    FREET4 0.96 09/05/2018    FREET4 0.89 11/21/2016    FREET4 0.88 05/16/2016            famotidine (PEPCID) 20 MG tablet [Pharmacy Med Name: FAMOTIDINE 20 MG Tablet] 90 tablet 0     Sig: TAKE 1 TABLET (20 MG TOTAL) BY MOUTH ONCE DAILY.        Last Kidney  Lab Results   Component Value Date    CREATININE 2.8 (H) 10/18/2019    CREATININE 3.5 (H) 09/18/2019    CREATININE 3.9 (H) 08/02/2019     Lab Results   Component Value Date    K 4.1 10/18/2019    K 4.2 09/18/2019    K 4.1 08/02/2019     Lab Results   Component Value Date     10/18/2019     09/18/2019     08/02/2019      Lab Results   Component Value Date    BUN 66 (H) 10/18/2019    BUN 65 (H) 09/18/2019    BUN 79 (H) 08/02/2019     Lab Results   Component Value Date    CO2 26 10/18/2019    CO2 26 09/18/2019    CO2 26 08/02/2019            Appointments     Date Provider   Last Visit   11/12/2019 Amarilys Johns MD   Next Visit   1/3/2020 Amarilys Johns MD

## 2019-12-17 ENCOUNTER — HOSPITAL ENCOUNTER (OUTPATIENT)
Dept: PULMONOLOGY | Facility: CLINIC | Age: 74
Discharge: HOME | End: 2019-12-17
Payer: MEDICARE

## 2019-12-17 VITALS — BODY MASS INDEX: 19.12 KG/M2 | HEIGHT: 64 IN | WEIGHT: 112 LBS

## 2019-12-17 DIAGNOSIS — I35.0 AORTIC STENOSIS, MODERATE: ICD-10-CM

## 2019-12-17 DIAGNOSIS — I50.31 ACUTE HEART FAILURE WITH NORMAL EJECTION FRACTION: ICD-10-CM

## 2019-12-17 PROCEDURE — 94618 PULMONARY STRESS TESTING: CPT | Mod: HCNC,S$GLB,, | Performed by: INTERNAL MEDICINE

## 2019-12-17 PROCEDURE — 94618 PULMONARY STRESS TESTING: ICD-10-PCS | Mod: HCNC,S$GLB,, | Performed by: INTERNAL MEDICINE

## 2019-12-17 RX ORDER — FAMOTIDINE 20 MG/1
20 TABLET, FILM COATED ORAL DAILY
Qty: 90 TABLET | Refills: 0 | Status: SHIPPED | OUTPATIENT
Start: 2019-12-17 | End: 2020-03-02

## 2019-12-17 RX ORDER — LEVOTHYROXINE SODIUM 75 UG/1
75 TABLET ORAL
Qty: 90 TABLET | Refills: 0 | Status: SHIPPED | OUTPATIENT
Start: 2019-12-17 | End: 2020-02-03

## 2019-12-17 NOTE — TELEPHONE ENCOUNTER
I have reviewed the assessment below. Famotidine dose ok for renal fxn. TSH WNL. Approve 3 more.

## 2019-12-18 NOTE — PROCEDURES
Ewelina Arnold is a 73 y.o.  female patient, who presents for a 6 minute walk test ordered by MD Gennaro.  The diagnosis is Pulmonary Hypertension.  The patient's BMI is 19.2 kg/m2.  Predicted distance (lower limit of normal) is 332.6 meters.      Test Results:    The test was completed without stopping.    The total time walked was 360 seconds.  During walking, the patient reported:  Dyspnea, Leg pain. The patient used a walker  during testing.     12/17/2019---------Distance: 206.35 meters (677 feet)     O2 Sat % Supplemental Oxygen Heart Rate Blood Pressure Dian Scale   Pre-exercise  (Resting) 98 % Room Air 64 bpm 137/63 mmHg 0.5   During Exercise 98 % Room Air 68 bpm 178/76 mmHg 3   Post-exercise  (Recovery) 97 % Room Air  63 bpm 154/72 mmHg       Recovery Time: 80 seconds    Performing nurse/tech: David CANELA      PREVIOUS STUDY:   The patient had a previous study.  07/25/2019---------Distance: 121.92 meters (400 feet)       O2 Sat % Supplemental Oxygen Heart Rate Blood Pressure Dian Scale   Pre-exercise  (Resting) 96 % Room Air 67 bpm 151/69 mmHg 0   During Exercise 98 % Room Air 74 bpm 162/70 mmHg 7-8   Post-exercise  (Recovery) 98 % Room Air  70 bpm            CLINICAL INTERPRETATION:  Six minute walk distance is 206.35 meters (677 feet) with moderate dyspnea.  During exercise, there was no significant desaturation while breathing room air.  Blood pressure increased significantly and Heart rate remained stable with walking.  This may represent a hypertensive response to exercise.  The patient reported non-pulmonary symptoms during exercise.  Significant exercise impairment is likely due to cardiovascular causes.  Since the previous study in July 2019, exercise capacity may be somewhat improved.  Based upon age and body mass index, exercise capacity is less than predicted.

## 2019-12-23 ENCOUNTER — INFUSION (OUTPATIENT)
Dept: INFECTIOUS DISEASES | Facility: HOSPITAL | Age: 74
End: 2019-12-23
Attending: INTERNAL MEDICINE
Payer: MEDICARE

## 2019-12-23 VITALS
SYSTOLIC BLOOD PRESSURE: 156 MMHG | WEIGHT: 113.31 LBS | HEIGHT: 64 IN | BODY MASS INDEX: 19.35 KG/M2 | DIASTOLIC BLOOD PRESSURE: 65 MMHG

## 2019-12-23 DIAGNOSIS — D63.1 ANEMIA OF CHRONIC RENAL FAILURE, STAGE 4 (SEVERE): ICD-10-CM

## 2019-12-23 DIAGNOSIS — N18.4 ANEMIA OF CHRONIC RENAL FAILURE, STAGE 4 (SEVERE): ICD-10-CM

## 2019-12-23 DIAGNOSIS — N18.5 ANEMIA IN STAGE 5 CHRONIC KIDNEY DISEASE: Primary | ICD-10-CM

## 2019-12-23 DIAGNOSIS — D63.1 ANEMIA IN STAGE 5 CHRONIC KIDNEY DISEASE: Primary | ICD-10-CM

## 2019-12-23 PROCEDURE — 63600175 PHARM REV CODE 636 W HCPCS: Mod: JG,HCNC,EC | Performed by: INTERNAL MEDICINE

## 2019-12-23 PROCEDURE — 96372 THER/PROPH/DIAG INJ SC/IM: CPT | Mod: HCNC

## 2019-12-23 RX ADMIN — DARBEPOETIN ALFA 100 MCG: 100 INJECTION, SOLUTION INTRAVENOUS; SUBCUTANEOUS at 10:12

## 2019-12-23 NOTE — PROGRESS NOTES
Hgb 10.1;Hct 32.2    No dose change  PER PROTOCOL FORM DROD-188. Aranesp 100 mcg given and 3 week return appt set.     Gfr 14.7/stage 5

## 2019-12-30 DIAGNOSIS — I25.84 CORONARY ARTERY DISEASE DUE TO CALCIFIED CORONARY LESION: ICD-10-CM

## 2019-12-30 DIAGNOSIS — I25.10 CORONARY ARTERY DISEASE DUE TO CALCIFIED CORONARY LESION: ICD-10-CM

## 2019-12-30 DIAGNOSIS — I1A.0 RESISTANT HYPERTENSION: ICD-10-CM

## 2019-12-30 DIAGNOSIS — I50.31 ACUTE HEART FAILURE WITH NORMAL EJECTION FRACTION: ICD-10-CM

## 2019-12-31 ENCOUNTER — TELEPHONE (OUTPATIENT)
Dept: PHARMACY | Facility: CLINIC | Age: 74
End: 2019-12-31

## 2019-12-31 DIAGNOSIS — M32.19 OTHER SYSTEMIC LUPUS ERYTHEMATOSUS WITH OTHER ORGAN INVOLVEMENT: Chronic | ICD-10-CM

## 2019-12-31 RX ORDER — AZATHIOPRINE 50 MG/1
50 TABLET ORAL DAILY
Qty: 30 TABLET | Refills: 2 | OUTPATIENT
Start: 2019-12-31 | End: 2020-12-30

## 2019-12-31 RX ORDER — CARVEDILOL 25 MG/1
25 TABLET ORAL 2 TIMES DAILY WITH MEALS
Qty: 180 TABLET | Refills: 3 | Status: SHIPPED | OUTPATIENT
Start: 2019-12-31 | End: 2020-12-30

## 2019-12-31 RX ORDER — NIFEDIPINE 60 MG/1
60 TABLET, EXTENDED RELEASE ORAL 2 TIMES DAILY
Qty: 180 TABLET | Refills: 3 | Status: SHIPPED | OUTPATIENT
Start: 2019-12-31

## 2019-12-31 NOTE — TELEPHONE ENCOUNTER
Refill call regarding Vindaqel from [facility]. Shipping out Vindaqel on  for 1/3 arrival with patients consent. Copay of $0 @ 004. Address and  confirmed.

## 2020-01-02 ENCOUNTER — TELEPHONE (OUTPATIENT)
Dept: PRIMARY CARE CLINIC | Facility: CLINIC | Age: 75
End: 2020-01-02

## 2020-01-02 DIAGNOSIS — I25.10 CORONARY ARTERY DISEASE DUE TO CALCIFIED CORONARY LESION: ICD-10-CM

## 2020-01-02 DIAGNOSIS — E03.9 ACQUIRED HYPOTHYROIDISM: Primary | ICD-10-CM

## 2020-01-02 DIAGNOSIS — I25.84 CORONARY ARTERY DISEASE DUE TO CALCIFIED CORONARY LESION: ICD-10-CM

## 2020-01-02 DIAGNOSIS — I50.31 ACUTE HEART FAILURE WITH NORMAL EJECTION FRACTION: ICD-10-CM

## 2020-01-02 DIAGNOSIS — F41.9 ANXIETY: ICD-10-CM

## 2020-01-02 RX ORDER — LATANOPROST 50 UG/ML
SOLUTION/ DROPS OPHTHALMIC
Qty: 3 BOTTLE | Refills: 12 | Status: SHIPPED | OUTPATIENT
Start: 2020-01-02

## 2020-01-02 RX ORDER — CITALOPRAM 20 MG/1
TABLET, FILM COATED ORAL
Qty: 135 TABLET | Refills: 0 | Status: SHIPPED | OUTPATIENT
Start: 2020-01-02

## 2020-01-02 RX ORDER — ATORVASTATIN CALCIUM 80 MG/1
TABLET, FILM COATED ORAL
Qty: 90 TABLET | Refills: 1 | Status: SHIPPED | OUTPATIENT
Start: 2020-01-02

## 2020-01-02 NOTE — TELEPHONE ENCOUNTER
----- Message from Selam Ferraro sent at 1/2/2020  9:50 AM CST -----  Contact: self/[phone]  Pt called in regards to talking to the dr about a ride to her appointment.      Please advise

## 2020-01-02 NOTE — TELEPHONE ENCOUNTER
Spoke with patient, explained that we would order LYFT in the morning for her to be at her home for 8 am to 830 am.

## 2020-01-02 NOTE — TELEPHONE ENCOUNTER
Please link labs (TSH/LIPIDS) with lab appointment on 01/14/2020        Please also check with your provider if any further labs need to be added and scheduled together.        Thank you

## 2020-01-02 NOTE — PROGRESS NOTES
Refill Authorization Note     is requesting a refill authorization.    Brief assessment and rationale for refill: REVIEW: citalopram -abnormal vitals // APPROVE: atorvastatin -alternate pharmacy/needs labs     Medication-related problems identified: Requires labs    Medication Therapy Plan: patient is taking less the 40mg of celexa daily; BP elevated at Infusion appt; NTBO(LIPID/TSH); NTBL(HEMOGLOBIN/LIPID/TSH); FOVS                              Comments:   Requested Prescriptions   Pending Prescriptions Disp Refills    atorvastatin (LIPITOR) 80 MG tablet [Pharmacy Med Name: ATORVASTATIN CALCIUM 80 MG Tablet] 90 tablet 1     Sig: TAKE 1 TABLET EVERY DAY       Hmg CoA Reductase Inhibitors Protocol Failed - 1/2/2020  2:32 PM        Failed - Lipid Panel within past 12 months     Lab Results   Component Value Date    CHOL 181 08/28/2018    HDL 66 08/28/2018    LDLCALC 104.2 08/28/2018    TRIG 54 08/28/2018             Passed - Patient not currently pregnant        Passed - No positive pregnancy test in past 12 months         Passed - Recent or future visit with authorizing provider        Passed - ALT less than 95 in past 12 months      Lab Results   Component Value Date    ALT 8 (L) 12/17/2019    ALT 7 (L) 10/18/2019    ALT 9 (L) 08/02/2019             citalopram (CELEXA) 20 MG tablet [Pharmacy Med Name: CITALOPRAM HYDROBROMIDE 20 MG Tablet] 135 tablet 0     Sig: TAKE 1 AND 1/2 TABLETS ONE TIME DAILY       Citalopram (Celexa) Protocol: Should not exceed 40 mg / day Failed - 1/2/2020  2:32 PM        Failed - Maximum daily dose does not exceed 20mg        Passed - Patient is not currently pregnant        Passed - No positive pregnancy test in past 12 months        Passed - Visit with authorizing provider in past 12 months or upcoming 90 days         Blood Pressure Readings: <139/89mmHg 12 months   BP Readings from Last 3 Encounters:   12/23/19 (!) 156/65   12/05/19 (!) 136/56   12/03/19 (!) 190/77          Digital Hypertension Data: values will display if enrolled   Last 5 Patient Entered Readings                                      Current 30 Day Average:      Recent Readings 8/16/2018 8/16/2018 8/16/2018 8/15/2018 8/15/2018    SBP (mmHg) 145 141 150 171 127    DBP (mmHg) 74 66 69 72 63    Pulse 57 55 60 62 61           Appointments  past 12m or future 3m with PCP    Date Provider   Last Visit   11/12/2019 Amarilys Johns MD   Next Visit   1/3/2020 Amarilys Johns MD

## 2020-01-03 ENCOUNTER — OFFICE VISIT (OUTPATIENT)
Dept: PRIMARY CARE CLINIC | Facility: CLINIC | Age: 75
End: 2020-01-03
Payer: MEDICARE

## 2020-01-03 VITALS
BODY MASS INDEX: 19.42 KG/M2 | TEMPERATURE: 98 F | SYSTOLIC BLOOD PRESSURE: 126 MMHG | HEIGHT: 64 IN | OXYGEN SATURATION: 94 % | HEART RATE: 53 BPM | DIASTOLIC BLOOD PRESSURE: 72 MMHG | WEIGHT: 113.75 LBS

## 2020-01-03 DIAGNOSIS — M32.19 OTHER SYSTEMIC LUPUS ERYTHEMATOSUS WITH OTHER ORGAN INVOLVEMENT: Chronic | ICD-10-CM

## 2020-01-03 DIAGNOSIS — E44.0 MALNUTRITION OF MODERATE DEGREE: ICD-10-CM

## 2020-01-03 DIAGNOSIS — I25.119 ATHEROSCLEROSIS OF NATIVE CORONARY ARTERY WITH ANGINA PECTORIS, UNSPECIFIED WHETHER NATIVE OR TRANSPLANTED HEART: ICD-10-CM

## 2020-01-03 DIAGNOSIS — D63.1 ANEMIA OF CHRONIC RENAL FAILURE, STAGE 4 (SEVERE): ICD-10-CM

## 2020-01-03 DIAGNOSIS — I70.0 AORTIC ATHEROSCLEROSIS: ICD-10-CM

## 2020-01-03 DIAGNOSIS — N18.4 ANEMIA OF CHRONIC RENAL FAILURE, STAGE 4 (SEVERE): ICD-10-CM

## 2020-01-03 DIAGNOSIS — M32.9 NEUROPATHY DUE TO SLE (SYSTEMIC LUPUS ERYTHEMATOSUS): ICD-10-CM

## 2020-01-03 DIAGNOSIS — F32.5 MAJOR DEPRESSIVE DISORDER WITH SINGLE EPISODE, IN FULL REMISSION: ICD-10-CM

## 2020-01-03 DIAGNOSIS — G63 NEUROPATHY DUE TO SLE (SYSTEMIC LUPUS ERYTHEMATOSUS): ICD-10-CM

## 2020-01-03 DIAGNOSIS — E85.82 WILD-TYPE TRANSTHYRETIN-RELATED (ATTR) AMYLOIDOSIS: ICD-10-CM

## 2020-01-03 DIAGNOSIS — N18.6 END STAGE RENAL DISEASE: ICD-10-CM

## 2020-01-03 DIAGNOSIS — I50.33 ACUTE ON CHRONIC DIASTOLIC CONGESTIVE HEART FAILURE: ICD-10-CM

## 2020-01-03 DIAGNOSIS — I50.32 CHRONIC DIASTOLIC HEART FAILURE: ICD-10-CM

## 2020-01-03 DIAGNOSIS — D69.2 SENILE PURPURA: ICD-10-CM

## 2020-01-03 PROCEDURE — 99215 PR OFFICE/OUTPT VISIT, EST, LEVL V, 40-54 MIN: ICD-10-PCS | Mod: HCNC,S$GLB,, | Performed by: INTERNAL MEDICINE

## 2020-01-03 PROCEDURE — 1159F PR MEDICATION LIST DOCUMENTED IN MEDICAL RECORD: ICD-10-PCS | Mod: HCNC,S$GLB,, | Performed by: INTERNAL MEDICINE

## 2020-01-03 PROCEDURE — 1126F PR PAIN SEVERITY QUANTIFIED, NO PAIN PRESENT: ICD-10-PCS | Mod: HCNC,S$GLB,, | Performed by: INTERNAL MEDICINE

## 2020-01-03 PROCEDURE — 1101F PR PT FALLS ASSESS DOC 0-1 FALLS W/OUT INJ PAST YR: ICD-10-PCS | Mod: HCNC,CPTII,S$GLB, | Performed by: INTERNAL MEDICINE

## 2020-01-03 PROCEDURE — 1159F MED LIST DOCD IN RCRD: CPT | Mod: HCNC,S$GLB,, | Performed by: INTERNAL MEDICINE

## 2020-01-03 PROCEDURE — 99499 RISK ADDL DX/OHS AUDIT: ICD-10-PCS | Mod: S$GLB,,, | Performed by: INTERNAL MEDICINE

## 2020-01-03 PROCEDURE — 1101F PT FALLS ASSESS-DOCD LE1/YR: CPT | Mod: HCNC,CPTII,S$GLB, | Performed by: INTERNAL MEDICINE

## 2020-01-03 PROCEDURE — 99215 OFFICE O/P EST HI 40 MIN: CPT | Mod: HCNC,S$GLB,, | Performed by: INTERNAL MEDICINE

## 2020-01-03 PROCEDURE — 1126F AMNT PAIN NOTED NONE PRSNT: CPT | Mod: HCNC,S$GLB,, | Performed by: INTERNAL MEDICINE

## 2020-01-03 PROCEDURE — 99499 UNLISTED E&M SERVICE: CPT | Mod: S$GLB,,, | Performed by: INTERNAL MEDICINE

## 2020-01-03 RX ORDER — AZATHIOPRINE 50 MG/1
50 TABLET ORAL DAILY
Qty: 30 TABLET | Refills: 2 | Status: SHIPPED | OUTPATIENT
Start: 2020-01-03 | End: 2020-01-10

## 2020-01-03 RX ORDER — AZATHIOPRINE 50 MG/1
50 TABLET ORAL DAILY
Qty: 30 TABLET | Refills: 2 | Status: CANCELLED | OUTPATIENT
Start: 2020-01-03 | End: 2021-01-02

## 2020-01-03 RX ORDER — FUROSEMIDE 40 MG/1
40 TABLET ORAL DAILY PRN
Qty: 90 TABLET | Refills: 3 | Status: SHIPPED | OUTPATIENT
Start: 2020-01-03

## 2020-01-03 NOTE — PROGRESS NOTES
Primary Care Provider Appointment    Subjective:      Patient ID: Ewelina Arnold is a 74 y.o. female with amyloidosis, HTN, MDD, pHTN, CKD    Chief Complaint: Establish Care (f/u, rx refill (lasix))    Patient is doing well with her treatment for amyloidosis (vindaquel), with significant improvement in functional status. She completed a 6MWT with mod dyspnea, which is an improvement from previous tests.    Home BP checks are 146/64 and 153/57.     Participates in pill-packs, is compliant with all meds. Is taking PRN doxazosin for HTN. She wants this removed from her pill packs.    Has ACI, is on aranesp infusions q3 weeks with Nephro. Hg is trending down, but remains >10.    Her lupus is stable on azothioprine. Is followed by Dr Ramirez. Her nodules are increasing in number. She cancelled her Rheum appt in 10/2019 due to not having money for the copay. She is using the steroid cream on her rash with no improvement.    Pills packed as follows:  Adherence packed for 28 days using Dispill:      Morning card:   Aspirin 81 mg   Carvedilol 25 mg   Citalopram 20 mg (1&1/2 tabs)   Famotidine 20 mg   Levothyroxine 75 mcg   Nifedipine ER Osmotic 60 mg   Sodium Bicarb 650 mg   Vitamin D 56013 IU (Once weekly)      Evening card:   Atorvastatin 80 mg   Carvedilol 25 mg   Doxazosin 2 mg  Nifedipine ER Osmotic 60 mg   Sodium Bicarb 650 mg          Electronically signed by Amanda AlmaguerD at 12/5/2019  3:33 PM       Past Surgical History:   Procedure Laterality Date    CARDIAC CATHETERIZATION  07/27/2011    x1    CHOLECYSTECTOMY  1999    COLON SURGERY  2000 & 2003    partial removal for CA    COLONOSCOPY N/A 4/14/2016    Procedure: COLONOSCOPY;  Surgeon: Jose Steen MD;  Location: [facility] ([address] [place] [address]);  Service: Endoscopy;  Laterality: N/A;  prep 2 days prior light meals only/clear liquid day before  and 4 dulcolax tabs (5 mgs) at noon    COLONOSCOPY N/A 9/14/2017    Procedure: COLONOSCOPY;  Surgeon: Jose Steen  MD;  Location: [facility] ([address] [place] [address]);  Service: Endoscopy;  Laterality: N/A;    EYE SURGERY      HAND SURGERY Bilateral 1996 & 1997    due to carpal tunnel     HERNIA REPAIR      HYSTERECTOMY  1983    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    OOPHORECTOMY      removed one    Peripheral Iridotomy both eyes  1994    laser angle correction    THYROIDECTOMY, PARTIAL  2006    to remove two nodules and one-half thyroid       Past Medical History:   Diagnosis Date    Acute hypoxemic respiratory failure 4/28/2018    Acute on chronic diastolic congestive heart failure 11/17/2017    Allergy     Anatomical narrow angle glaucoma     Anemia     States diagnosed about a month ago    Aortic atherosclerosis     Arthritis     Atherosclerosis of native coronary artery with angina pectoris 1/3/2020    Cataract     CHF (congestive heart failure)     Chronic kidney disease     Chronic sciatica of left side     Right lower back pain due to sciatica    Coronary artery disease     Depression     Dry eyes     Gastroenteritis, acute     GERD (gastroesophageal reflux disease)     History of colon cancer     Hyperaldosteronism     Hyperlipidemia     Hypertension     Hypothyroidism     Keloid cicatrix     Left ventricular diastolic dysfunction with preserved systolic function     Lupus (systemic lupus erythematosus) 8/17/2012    Lupus (systemic lupus erythematosus)     Malnutrition 5/16/2017    Osteoarthritis of cervical spine 8/17/2012    Osteopenia     PAD (peripheral artery disease)     FRAN (renal artery stenosis)        Social History     Socioeconomic History    Marital status: Single     Spouse name: Not on file    Number of children: Not on file    Years of education: Not on file    Highest education level: Not on file   Occupational History     Employer: Retired    Social Needs    Financial resource strain: Not very hard    Food insecurity:     Worry: Never true     Inability: Never true     "Transportation needs:     Medical: Yes     Non-medical: Yes   Tobacco Use    Smoking status: Former Smoker     Packs/day: 1.50     Years: 30.00     Pack years: 45.00     Types: Cigarettes     Start date:      Last attempt to quit: 3/13/1985     Years since quittin.8    Smokeless tobacco: Never Used   Substance and Sexual Activity    Alcohol use: No    Drug use: No    Sexual activity: Not Currently     Partners: Male     Birth control/protection: Abstinence   Lifestyle    Physical activity:     Days per week: Not on file     Minutes per session: Not on file    Stress: Not on file   Relationships    Social connections:     Talks on phone: Not on file     Gets together: Not on file     Attends Oriental orthodox service: Not on file     Active member of club or organization: Not on file     Attends meetings of clubs or organizations: Not on file     Relationship status: Not on file   Other Topics Concern    Are you pregnant or think you may be? No    Breast-feeding No   Social History Narrative    Not on file       Review of Systems   Constitutional: Positive for activity change and fatigue. Negative for appetite change.   Respiratory: Negative for cough, choking and shortness of breath.    Cardiovascular: Negative for leg swelling.   Endocrine: Positive for cold intolerance.   Genitourinary: Negative for difficulty urinating and dysuria.   Skin: Positive for wound.   Neurological: Negative for dizziness and light-headedness.   Hematological: Bruises/bleeds easily.   Psychiatric/Behavioral: Positive for agitation. Negative for behavioral problems, confusion, decreased concentration and dysphoric mood.       Objective:   /72 (BP Location: Left arm, Patient Position: Sitting, BP Method: Small (Manual))   Pulse (!) 53   Temp 98.3 °F (36.8 °C)   Ht 5' 4" (1.626 m)   Wt 51.6 kg (113 lb 12.1 oz)   LMP  (LMP Unknown) Comment: 99  SpO2 (!) 94%   BMI 19.53 kg/m²     Physical Exam   Constitutional: She is " oriented to person, place, and time. She appears well-developed and well-nourished.   HENT:   Head: Normocephalic and atraumatic.   Neck: Normal range of motion.   Pulmonary/Chest: Effort normal.   Musculoskeletal: She exhibits deformity. She exhibits no edema.   Neurological: She is alert and oriented to person, place, and time.   Skin:   Upper extremity lesions   Psychiatric: She has a normal mood and affect. Her behavior is normal. Thought content normal.   Vitals reviewed.      Lab Results   Component Value Date    WBC 4.22 10/18/2019    HGB 10.1 (L) 12/17/2019    HCT 32.2 (L) 12/17/2019     10/18/2019    CHOL 181 08/28/2018    TRIG 54 08/28/2018    HDL 66 08/28/2018    ALT 8 (L) 12/17/2019    AST 28 12/17/2019     12/17/2019    K 4.3 12/17/2019     12/17/2019    CREATININE 3.4 (H) 12/17/2019    BUN 85 (H) 12/17/2019    CO2 28 12/17/2019    TSH 1.798 01/25/2019    INR 1.0 09/05/2018    GLUF 79 12/02/2011    HGBA1C 5.2 09/25/2018       RESULTS: Reviewed labs and images today    Assessment:   74 y.o. 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)     Upper lip numbness, SLE treated with Imuran, followed by Rheum   · F/U Rheum  · Continue Imuran            Psychiatric    Major depressive disorder with single episode, in full remission     Stable with better control of chronic issues  · Monitor  · Treat chronic conditions  · Continue celexa 30mg  · Advised active lifestyle  · Exercise  · Social activity            Cardiac/Vascular    Aortic atherosclerosis    Chronic diastolic heart failure     Grade 2 diastolic failure, systolic HF, aortic stenosis  · Decrease lasix to 40mg PRN  · Remove lasix from pill pack  · TAVR deemed not necessary at this time         Relevant Medications    furosemide  (LASIX) 40 MG tablet    Atherosclerosis of native coronary artery with angina pectoris     Compliant with statin and other cardiac meds. BP controlled  · Continue cardiac meds in pill packs            Renal/    End stage renal disease     Followed by Dr Epperson  · Advised financial assistance to afford copays with many specialists  · Needs routine appts             Immunology/Multi System    Wild-type transthyretin-related (ATTR) amyloidosis     Treated with vindaquel with Cardiology, improvement in functional status  · Continue vindaquel per Cardiology            Hematology    Senile purpura     Ecchymoses and purpura on UE bilaterally  · monitor            Oncology    Anemia of chronic renal failure, stage 4 (severe)     Followed by Nephrology, PRN iron infusions, arenesp injections  · F/U Nephrology (Dr Epperson)  · Iron infusion PRN  · Continue arenesp            Endocrine    Malnutrition of moderate degree     Low BMI, poor PO intake, uncontrolled chronic diseases  · Advised improved nutrition  · Low salt diet           Other Visit Diagnoses     Acute on chronic diastolic congestive heart failure        Relevant Medications    furosemide (LASIX) 40 MG tablet    Other Relevant Orders    MyChart Patient Entered Weight          Health Maintenance       Date Due Completion Date    Shingles Vaccine (2 of 3) 01/10/2020 (Originally 1/9/2012) 11/14/2011    Mammogram 09/18/2020 9/18/2019    High Dose Statin 01/03/2021 1/3/2020    DEXA SCAN 01/08/2022 1/8/2019    Colonoscopy 09/14/2022 9/14/2017    Lipid Panel 08/28/2023 8/28/2018    TETANUS VACCINE 06/29/2026 6/29/2016          Follow up in about 6 weeks (around 2/14/2020). Total face-to-face time was 60 min, 50% of this was spent on counseling and coordination of care. The following issues were discussed: with amyloidosis, HTN, MDD, pHTN, CKD    Amarilys Johns MD/MPH  Internal Medicine  [facility]  [phone]

## 2020-01-03 NOTE — ASSESSMENT & PLAN NOTE
Stable with better control of chronic issues  · Monitor  · Treat chronic conditions  · Continue celexa 30mg  · Advised active lifestyle  · Exercise  · Social activity

## 2020-01-03 NOTE — ASSESSMENT & PLAN NOTE
Treated with vindaquel with Cardiology, improvement in functional status  · Continue vindaquel per Cardiology

## 2020-01-03 NOTE — PATIENT INSTRUCTIONS
TODAY:  - PCP messaged Dr Ramirez about the rash  - being in your social security letter and pension letter   + meeting with financial assistance today  - lasix sent to [facility]  - remove doxazosin from pill packs

## 2020-01-03 NOTE — ASSESSMENT & PLAN NOTE
Compliant with statin and other cardiac meds. BP controlled  · Continue cardiac meds in pill packs

## 2020-01-03 NOTE — Clinical Note
Dr Ramirez and staff,Ms Clayton is requesting her appt to be moved up due to her concerns over her skin lesions related to lupus. Is this possible?Thanks,Amarilys Johns MD/MPHInternal MedicineOchs[facility] and [phone] spectralink

## 2020-01-03 NOTE — ASSESSMENT & PLAN NOTE
Followed by Dr Epperson  · Advised financial assistance to afford copays with many specialists  · Needs routine appts

## 2020-01-03 NOTE — ASSESSMENT & PLAN NOTE
Grade 2 diastolic failure, systolic HF, aortic stenosis  · Decrease lasix to 40mg PRN  · Remove lasix from pill pack  · TAVR deemed not necessary at this time

## 2020-01-06 NOTE — PROGRESS NOTES
Adherence packed for 28 days using Dispill:     Morning card:   Aspirin 81 mg   Carvedilol 25 mg   Citalopram 20 mg (1&1/2 tabs)   Famotidine 20 mg   Levothyroxine 75 mcg   Nifedipine ER Osmotic 60 mg   Sodium Bicarb 650 mg   Vitamin D 38755 IU (Once weekly)     Evening card:   Atorvastatin 80 mg   Carvedilol 25 mg   Nifedipine ER Osmotic 60 mg   Sodium Bicarb 650 mg     *1/6/2020 - removed Doxazosin 2 mg and gave in separate bottle

## 2020-01-10 ENCOUNTER — OFFICE VISIT (OUTPATIENT)
Dept: RHEUMATOLOGY | Facility: CLINIC | Age: 75
End: 2020-01-10
Payer: MEDICARE

## 2020-01-10 ENCOUNTER — TELEPHONE (OUTPATIENT)
Dept: PRIMARY CARE CLINIC | Facility: CLINIC | Age: 75
End: 2020-01-10

## 2020-01-10 ENCOUNTER — OFFICE VISIT (OUTPATIENT)
Dept: NEPHROLOGY | Facility: CLINIC | Age: 75
End: 2020-01-10
Payer: MEDICARE

## 2020-01-10 VITALS
BODY MASS INDEX: 19.31 KG/M2 | OXYGEN SATURATION: 97 % | HEIGHT: 64 IN | DIASTOLIC BLOOD PRESSURE: 60 MMHG | WEIGHT: 113.13 LBS | SYSTOLIC BLOOD PRESSURE: 172 MMHG | HEART RATE: 65 BPM

## 2020-01-10 VITALS
WEIGHT: 115.94 LBS | SYSTOLIC BLOOD PRESSURE: 130 MMHG | HEART RATE: 64 BPM | HEIGHT: 65 IN | DIASTOLIC BLOOD PRESSURE: 57 MMHG | BODY MASS INDEX: 19.32 KG/M2

## 2020-01-10 DIAGNOSIS — E26.9 HYPERALDOSTERONISM: ICD-10-CM

## 2020-01-10 DIAGNOSIS — N18.4 ANEMIA IN STAGE 4 CHRONIC KIDNEY DISEASE: ICD-10-CM

## 2020-01-10 DIAGNOSIS — M32.19 OTHER SYSTEMIC LUPUS ERYTHEMATOSUS WITH OTHER ORGAN INVOLVEMENT: Primary | Chronic | ICD-10-CM

## 2020-01-10 DIAGNOSIS — N18.4 CHRONIC KIDNEY DISEASE, STAGE IV (SEVERE): Primary | ICD-10-CM

## 2020-01-10 DIAGNOSIS — Z79.899 HIGH RISK MEDICATION USE: ICD-10-CM

## 2020-01-10 DIAGNOSIS — D63.1 ANEMIA IN STAGE 4 CHRONIC KIDNEY DISEASE: ICD-10-CM

## 2020-01-10 PROCEDURE — 99214 OFFICE O/P EST MOD 30 MIN: CPT | Mod: HCNC,S$GLB,, | Performed by: INTERNAL MEDICINE

## 2020-01-10 PROCEDURE — 1126F PR PAIN SEVERITY QUANTIFIED, NO PAIN PRESENT: ICD-10-PCS | Mod: HCNC,S$GLB,, | Performed by: INTERNAL MEDICINE

## 2020-01-10 PROCEDURE — 99499 UNLISTED E&M SERVICE: CPT | Mod: S$GLB,,, | Performed by: INTERNAL MEDICINE

## 2020-01-10 PROCEDURE — 99999 PR PBB SHADOW E&M-EST. PATIENT-LVL III: ICD-10-PCS | Mod: PBBFAC,HCNC,, | Performed by: INTERNAL MEDICINE

## 2020-01-10 PROCEDURE — 1126F AMNT PAIN NOTED NONE PRSNT: CPT | Mod: HCNC,S$GLB,, | Performed by: INTERNAL MEDICINE

## 2020-01-10 PROCEDURE — 99214 PR OFFICE/OUTPT VISIT, EST, LEVL IV, 30-39 MIN: ICD-10-PCS | Mod: HCNC,S$GLB,, | Performed by: INTERNAL MEDICINE

## 2020-01-10 PROCEDURE — 1101F PR PT FALLS ASSESS DOC 0-1 FALLS W/OUT INJ PAST YR: ICD-10-PCS | Mod: HCNC,CPTII,S$GLB, | Performed by: INTERNAL MEDICINE

## 2020-01-10 PROCEDURE — 99999 PR PBB SHADOW E&M-EST. PATIENT-LVL V: ICD-10-PCS | Mod: PBBFAC,HCNC,, | Performed by: INTERNAL MEDICINE

## 2020-01-10 PROCEDURE — 1101F PT FALLS ASSESS-DOCD LE1/YR: CPT | Mod: HCNC,CPTII,S$GLB, | Performed by: INTERNAL MEDICINE

## 2020-01-10 PROCEDURE — 1159F MED LIST DOCD IN RCRD: CPT | Mod: HCNC,S$GLB,, | Performed by: INTERNAL MEDICINE

## 2020-01-10 PROCEDURE — 99499 RISK ADDL DX/OHS AUDIT: ICD-10-PCS | Mod: S$GLB,,, | Performed by: INTERNAL MEDICINE

## 2020-01-10 PROCEDURE — 1159F PR MEDICATION LIST DOCUMENTED IN MEDICAL RECORD: ICD-10-PCS | Mod: HCNC,S$GLB,, | Performed by: INTERNAL MEDICINE

## 2020-01-10 PROCEDURE — 99999 PR PBB SHADOW E&M-EST. PATIENT-LVL III: CPT | Mod: PBBFAC,HCNC,, | Performed by: INTERNAL MEDICINE

## 2020-01-10 PROCEDURE — 99999 PR PBB SHADOW E&M-EST. PATIENT-LVL V: CPT | Mod: PBBFAC,HCNC,, | Performed by: INTERNAL MEDICINE

## 2020-01-10 RX ORDER — AZATHIOPRINE 50 MG/1
50 TABLET ORAL 2 TIMES DAILY
Qty: 60 TABLET | Refills: 2 | Status: SHIPPED | OUTPATIENT
Start: 2020-01-10 | End: 2020-03-12

## 2020-01-10 ASSESSMENT — ROUTINE ASSESSMENT OF PATIENT INDEX DATA (RAPID3)
AM STIFFNESS SCORE: 0, NO
TOTAL RAPID3 SCORE: 4
PSYCHOLOGICAL DISTRESS SCORE: 3.3
PATIENT GLOBAL ASSESSMENT SCORE: 8
MDHAQ FUNCTION SCORE: .6
PAIN SCORE: 2
FATIGUE SCORE: 3

## 2020-01-10 NOTE — TELEPHONE ENCOUNTER
Please see if patient puts her azothioprine (imuran) in her pill packs. Does she want us to add the new dose of 50mg BID to the next pill packs?    Thanks,  KJ

## 2020-01-10 NOTE — ASSESSMENT & PLAN NOTE
She has recurrent skin lesions but they are very limited and she is not having other sx of systemic disease  Dec labs largely stable; chronic azotemia and anemia    She appears to be able to control the lesions with diprolene ointment but remains very anxious about them  I have tried to reassure her but will also increase AZA to 50 mg bid to see if this will help.  She will need CBC and CMP for AZA monitoring monthly x 3    RTC me in 3-4 mo with lupus standing labs

## 2020-01-10 NOTE — PROGRESS NOTES
"Subjective:       Patient ID: Ewelina Arnold is a 74 y.o. female.    Chief Complaint: No chief complaint on file.    Early 1990's developed acute respiratory failure and spent weeks in ICU [facility]    Positive CARYN, SSB    Hx Rash, leukopenia    Took  hydroxychloroquine >5 yr; stopped after eye exam April 2019 due to abnormal OCT  Since stopping HCQ she has developed new DLE lesions     Hx:  CAD and Aortic stenosis and hx CHF; s/p PCI; ASA , rosuvastatin, carvedilol, furosemide, isosorbide, metolazone ; has appt with Dr. Burdick (unhappy with [facility])  HTN nifedipine  Pulmonary nodule  Thyroid disease on levothyroxine  CKD; she donated a kidney to her brother  Now chronic anemia     Persistent rash since stopped HCQ due to eye disease  Diprolene ointment helps; luciana in price this year to $85/tube  AZA 50 mg/d has not really helped rash    No other lupus c/o  Review of Systems   Constitutional: Negative for fever and unexpected weight change.   HENT: Positive for trouble swallowing.    Eyes: Negative for redness.   Respiratory: Positive for cough. Negative for shortness of breath.    Cardiovascular: Negative for chest pain.   Gastrointestinal: Positive for diarrhea. Negative for constipation.   Genitourinary: Negative for dysuria and genital sores.   Skin: Positive for rash.   Neurological: Negative for headaches.   Hematological: Bruises/bleeds easily.         Objective:   BP (!) 130/57 (BP Location: Left arm, Patient Position: Sitting, BP Method: Medium (Automatic))   Pulse 64   Ht 5' 4.5" (1.638 m)   Wt 52.6 kg (115 lb 15.4 oz)   LMP  (LMP Unknown) Comment: 99  BMI 19.60 kg/m²      Physical Exam   Skin:     Sl raised pink lesions on arms bilaterally  Small pink papules R eyebrow, R forearm, L forearm         Lab Results   Component Value Date    SEDRATE 48 (H) 10/18/2019      Assessment:       1. Other systemic lupus erythematosus with other organ involvement    2. High risk medication use - Both Eyes      "       Plan:       Problem List Items Addressed This Visit        Active Problems    Other forms of systemic lupus erythematosus - Primary (Chronic)     She has recurrent skin lesions but they are very limited and she is not having other sx of systemic disease  Dec labs largely stable; chronic azotemia and anemia    She appears to be able to control the lesions with diprolene ointment but remains very anxious about them  I have tried to reassure her but will also increase AZA to 50 mg bid to see if this will help.  She will need CBC and CMP for AZA monitoring monthly x 3    RTC me in 3-4 mo with lupus standing labs         Relevant Medications    azaTHIOprine (IMURAN) 50 mg Tab    High risk medication use - Both Eyes     Must continue to avoid chloroquine/hydroxychloroquine for this

## 2020-01-10 NOTE — PROGRESS NOTES
Rapid3 Question Responses and Scores 1/3/2020   MDHAQ Score 0.6   Psychologic Score 3.3   Pain Score 2   When you awakened in the morning OVER THE LAST WEEK, did you feel stiff? No   Fatigue Score 3   Global Health Score 8   RAPID3 Score 4       Answers for HPI/ROS submitted by the patient on 1/3/2020   fever: No  eye redness: No  headaches: No  shortness of breath: No  chest pain: No  trouble swallowing: Yes  diarrhea: Yes  constipation: No  unexpected weight change: No  genital sore: No  dysuria: No  During the last 3 days, have you had a skin rash?: Yes  Bruises or bleeds easily: Yes  cough: Yes

## 2020-01-10 NOTE — TELEPHONE ENCOUNTER
No she'll use what she has until she gets the new pill pack because she just picked up one this week.

## 2020-01-10 NOTE — TELEPHONE ENCOUNTER
Pt informed me she will obtain the medicine from [facility] pharmacy and bring it when it is time for her next Pill pack refill.

## 2020-01-10 NOTE — TELEPHONE ENCOUNTER
Patients azothioprine was increased to 50mg BID by Rheum. She will need this change in her next pill pack. Pharmacy informed.    WENDY

## 2020-01-13 NOTE — TELEPHONE ENCOUNTER
DOCUMENTATION ONLY:   Azathioprine does not require prior authorization  Estimated Co-Pay: $4.00

## 2020-01-14 ENCOUNTER — INFUSION (OUTPATIENT)
Dept: INFECTIOUS DISEASES | Facility: HOSPITAL | Age: 75
End: 2020-01-14
Attending: INTERNAL MEDICINE
Payer: MEDICARE

## 2020-01-14 ENCOUNTER — TELEPHONE (OUTPATIENT)
Dept: RHEUMATOLOGY | Facility: CLINIC | Age: 75
End: 2020-01-14

## 2020-01-14 ENCOUNTER — TELEPHONE (OUTPATIENT)
Dept: PHARMACY | Facility: CLINIC | Age: 75
End: 2020-01-14

## 2020-01-14 VITALS
DIASTOLIC BLOOD PRESSURE: 73 MMHG | WEIGHT: 115.75 LBS | BODY MASS INDEX: 19.76 KG/M2 | SYSTOLIC BLOOD PRESSURE: 160 MMHG | HEIGHT: 64 IN

## 2020-01-14 DIAGNOSIS — N18.5 ANEMIA IN STAGE 5 CHRONIC KIDNEY DISEASE: Primary | ICD-10-CM

## 2020-01-14 DIAGNOSIS — N18.4 ANEMIA OF CHRONIC RENAL FAILURE, STAGE 4 (SEVERE): ICD-10-CM

## 2020-01-14 DIAGNOSIS — D63.1 ANEMIA OF CHRONIC RENAL FAILURE, STAGE 4 (SEVERE): ICD-10-CM

## 2020-01-14 DIAGNOSIS — Z79.899 HIGH RISK MEDICATION USE: Primary | ICD-10-CM

## 2020-01-14 DIAGNOSIS — D63.1 ANEMIA IN STAGE 5 CHRONIC KIDNEY DISEASE: Primary | ICD-10-CM

## 2020-01-14 PROCEDURE — 63600175 PHARM REV CODE 636 W HCPCS: Mod: JG,HCNC | Performed by: INTERNAL MEDICINE

## 2020-01-14 PROCEDURE — 96372 THER/PROPH/DIAG INJ SC/IM: CPT | Mod: HCNC

## 2020-01-14 RX ADMIN — DARBEPOETIN ALFA 100 MCG: 100 INJECTION, SOLUTION INTRAVENOUS; SUBCUTANEOUS at 12:01

## 2020-01-14 NOTE — TELEPHONE ENCOUNTER
Patient called for status of Imuran. She only has 2 tablets left to get her through 1/15/20 AM dose. Dosage has been increased to Imuran 50mg po BID - confirmed with Dr. Ramirez' notes for her lupus due to concerns of lesion control. Patient was at the doctor and could not speak for long. She was at main campus but was not able to come to [facility] b/c she has a walker device. She would like [facility] to ship out Imuran on 1/14/20 to arrive at her home on 1/15/20 before PM dose is due. $4 copay ([facility]). Address confirmed. She was not able to give a dose count but Vyndaqel is NOT needed at this time (LF 1/2/20 so [facility] willl f/u for that refill on 1/23/20).  All questions answered to patient's satisfaction. TTN

## 2020-01-14 NOTE — PROGRESS NOTES
Hgb 11.0;Hct 36.7    No dose change  PER PROTOCOL FORM DROD-188 instead we increased interval between injection to 4 weeks .Aranesp 100 mcg given and 4 week return appt set.  Dr Zhou diaz.    Gfr 14.7/stage 5

## 2020-01-15 ENCOUNTER — PATIENT MESSAGE (OUTPATIENT)
Dept: RHEUMATOLOGY | Facility: CLINIC | Age: 75
End: 2020-01-15

## 2020-01-16 ENCOUNTER — TELEPHONE (OUTPATIENT)
Dept: PULMONOLOGY | Facility: CLINIC | Age: 75
End: 2020-01-16

## 2020-01-17 ENCOUNTER — TELEPHONE (OUTPATIENT)
Dept: DERMATOLOGY | Facility: CLINIC | Age: 75
End: 2020-01-17

## 2020-01-17 ENCOUNTER — TELEPHONE (OUTPATIENT)
Dept: PRIMARY CARE CLINIC | Facility: CLINIC | Age: 75
End: 2020-01-17

## 2020-01-17 NOTE — TELEPHONE ENCOUNTER
----- Message from Tamika Combs sent at 1/17/2020  9:51 AM CST -----  Contact: Self [phone]  Patient will like to speak to Louise or the nurse to reschedule appt, please advise.

## 2020-01-17 NOTE — TELEPHONE ENCOUNTER
Spoke to pharmacy,Waiting for doctor to respond. ----- Message from Jimmy Bravo sent at 1/17/2020  2:43 PM CST -----  Contact: Pharmacy @ [phone]  Caller requesting a return call regarding an alternative to the medication (betamethasone dipropionate (DIPROLENE) 0.05 % ointment ) pls call

## 2020-01-20 ENCOUNTER — TELEPHONE (OUTPATIENT)
Dept: PHARMACY | Facility: CLINIC | Age: 75
End: 2020-01-20

## 2020-01-20 NOTE — TELEPHONE ENCOUNTER
Vyndaqel and Imuran F/U attempted.  Patient stated she was about to leave the house and requested for [facility] to call back at another time.  Will F/U in 2 days.

## 2020-01-20 NOTE — PROGRESS NOTES
Subjective:       Patient ID: Ewelina Arnold is a 74 y.o. Black or  female who presents for re-evaluation of progression of Chronic Kidney Disease    HPI    Ms. Arnold is a 74 year old woman with medical history of hypertension, chronic kidney disease with single kidney (donated kidney to her brother), hyperaldosteronism, coronary artery disease and severe aortic stenosis, recently diagnosed with amyloid cardiomyopathy presenting for further management of blood pressure and kidney function.  Patient reports daily weights have been relatively stable.  She reports shortness of breath with exertion, stable.  She reports blood pressure relatively stable since last visit, reviewed BP diary.  She otherwise denies any fever, chest pain, abdominal pain, dysuria/hematuria.     Review of Systems   Constitutional: Negative for appetite change, fatigue and fever.   Respiratory: Negative for chest tightness and shortness of breath.    Cardiovascular: Negative for chest pain and leg swelling.   Gastrointestinal: Negative for abdominal pain, constipation, diarrhea, nausea and vomiting.   Genitourinary: Negative for difficulty urinating, dysuria, flank pain, frequency, hematuria and urgency.   Musculoskeletal: Negative for arthralgias, joint swelling and myalgias.   Skin: Negative for rash and wound.   Neurological: Negative for dizziness, weakness and light-headedness.   All other systems reviewed and are negative.      Objective:      Physical Exam   Constitutional: She appears well-developed and well-nourished.   Cardiovascular: Normal rate, regular rhythm and normal heart sounds. Exam reveals no gallop and no friction rub.   No murmur heard.  Pulmonary/Chest: Effort normal and breath sounds normal. No respiratory distress. She has no wheezes. She has no rales.   Abdominal: Soft. Bowel sounds are normal. There is no tenderness.   Musculoskeletal: She exhibits no edema.   Neurological: She is alert.   Skin: Skin is  warm and dry. No rash noted. No erythema.   Psychiatric: She has a normal mood and affect.       Assessment:       1. Chronic kidney disease, stage IV (severe)    2. Solitary kidney    3. Anemia in stage 4 chronic kidney disease    4. Hyperaldosteronism        Plan:     Ms. Arnold is a 74 year old woman with medical history of hypertension, chronic kidney disease stage IV with single kidney (donated kidney to her brother), hyperaldosteronism, coronary artery disease and severe aortic stenosis, recently diagnosed with amyloid cardiomyopathy  presenting for further management of blood pressure and kidney function.  Patient previously with acute kidney injury, likely due to cardiomyopathy and moderate volume depletion with diuresis, creatinine improved prior to and since discharge, within baseline range since last visit.  Will trend symptoms/creatinine closely, will phone review BP diary and daily weights.  Encouraged oral/fluid intake for now, suspect dry weight ~110-115lbs.  Further recommendations pending results of above, patient voiced understanding of above, agreeable to plan as noted.    - Aortic Stenosis/amyloid cardiomyopathy: patient seen by Interventional Cardiology, they are reluctant to perform TAVR given patient's kidney function and risk for worsening function requiring dialysis (though would suspect kidney function may improve with intervention).  Discussed with Dr. Burdick, will continue to monitor response to treatment for cardiac amyloidosis (Vyndaqel, tafamidis) prior to considering initiation of HD (discussed at length with patient), creatinine previously improved.   - Anemia: Hgb previously at goal, given IV iron, continue darbepoetin in Anemia clinic  - Bone/mineral metabolism: patient with secondary hyperparathyroidism, PTH at goal for stage CKD, patient on ergocalciferol for VitD deficiency    Return to clinic in 8-12 weeks pending renal panel next week, then with renal/heme panel,  iron/TIBC/ferritin, urinalysis/culture, urine protein/creatinine ratio prior to next visit

## 2020-01-21 ENCOUNTER — TELEPHONE (OUTPATIENT)
Dept: PRIMARY CARE CLINIC | Facility: CLINIC | Age: 75
End: 2020-01-21

## 2020-01-21 DIAGNOSIS — I50.32 CHRONIC DIASTOLIC HEART FAILURE: Primary | ICD-10-CM

## 2020-01-21 NOTE — TELEPHONE ENCOUNTER
----- Message from Candace Orlando sent at 1/21/2020 12:41 PM CST -----  Contact: [phone]  Patient is returning a phone call.  Who left a message for the patient:   Does patient know what this is regarding:    Comments: please advise, thanks

## 2020-01-22 ENCOUNTER — PATIENT MESSAGE (OUTPATIENT)
Dept: RHEUMATOLOGY | Facility: CLINIC | Age: 75
End: 2020-01-22

## 2020-01-22 ENCOUNTER — TELEPHONE (OUTPATIENT)
Dept: RHEUMATOLOGY | Facility: CLINIC | Age: 75
End: 2020-01-22

## 2020-01-22 NOTE — TELEPHONE ENCOUNTER
----- Message from Hui Bates RN sent at 1/22/2020  2:15 PM CST -----      ----- Message -----  From: Jeane Bergerno, PharmD  Sent: 1/22/2020   2:08 PM CST  To: Amena Lozano, RT, #    Greetings Dr Ramirez and staff,     We spoke with Ms Arnold today and she requested we reach out to your office regarding her Imuran. She has increased her dose to 50 mg twice daily but she reports her rash has spread and she feel like it is getting worse. Wanted to make your office aware. Thank you for your time.       Sincerely,   Candace [facility]   [phone]

## 2020-01-24 NOTE — TELEPHONE ENCOUNTER
Cristina,  Can you recommend a less expensive steroid topical for her. So far, azathioprine has had little effect . She cannot take hydroxychloroquine after developing ocular toxiticy.  Luciano

## 2020-01-28 DIAGNOSIS — L93.2 CUTANEOUS LUPUS ERYTHEMATOSUS: Primary | ICD-10-CM

## 2020-01-28 RX ORDER — BETAMETHASONE DIPROPIONATE 0.5 MG/G
CREAM TOPICAL 2 TIMES DAILY
Qty: 50 G | Refills: 3 | Status: SHIPPED | OUTPATIENT
Start: 2020-01-28

## 2020-01-29 NOTE — TELEPHONE ENCOUNTER
Vyndaqel is approved by the patient's insurance with a high co pay. We will be assisting the patient in applying to the  for assistance.   Sending Dr Yeimi Burdick a staff message regarding approval and faxing assistance application for their review and signature

## 2020-01-30 NOTE — PLAN OF CARE
Problem: Patient Care Overview  Goal: Plan of Care Review  Outcome: Ongoing (interventions implemented as appropriate)  Plan of care reviewed with patient.Patient instructed on healthy diet to improve strength and healing. Patient offered between meal snack and accepted since patient consumed half of breakfast and lunch. Patient instructed on safe transfers bed to chair/toilet. Patient instructed to call for help before attempting to transfer. Patient remains free of falls/injury. Continue plan of care.       55

## 2020-02-03 ENCOUNTER — PATIENT MESSAGE (OUTPATIENT)
Dept: TRANSPLANT | Facility: CLINIC | Age: 75
End: 2020-02-03

## 2020-02-03 DIAGNOSIS — E03.9 HYPOTHYROIDISM, UNSPECIFIED TYPE: ICD-10-CM

## 2020-02-03 RX ORDER — LEVOTHYROXINE SODIUM 75 UG/1
75 TABLET ORAL
Qty: 90 TABLET | Refills: 0 | Status: SHIPPED | OUTPATIENT
Start: 2020-02-03 | End: 2021-02-02

## 2020-02-03 NOTE — PROGRESS NOTES
Adherence packed for 28 days using Dispill:     Morning card:   Aspirin 81 mg   Azathioprine 50 mg  Carvedilol 25 mg   Citalopram 20 mg (1&1/2 tabs)   Famotidine 20 mg   Levothyroxine 75 mcg   Nifedipine ER Osmotic 60 mg   Sodium Bicarb 650 mg   Vitamin D 00675 IU (Once weekly)     Evening card:   Atorvastatin 80 mg   Azathioprine 50 mg  Carvedilol 25 mg   Nifedipine ER Osmotic 60 mg   Sodium Bicarb 650 mg

## 2020-02-04 NOTE — TELEPHONE ENCOUNTER
FOR DOCUMENTATION ONLY: Financial Assistance for Vyndaqel is approved from 2/4/20 to 12/31/20  [employer]. Sending a staff message to Dr Yeimi Burdick regarding assistance approval.

## 2020-02-05 ENCOUNTER — TELEPHONE (OUTPATIENT)
Dept: PHARMACY | Facility: CLINIC | Age: 75
End: 2020-02-05

## 2020-02-05 NOTE — TELEPHONE ENCOUNTER
"Called patient to follow up on rash after starting betamethasone augmented 0.05% cream. Name/ verified. No new medications, health conditions, or allergies. Patient reports her rash is not as "angry". It still is itchy and covers her face, neck, arms, legs, and neck. She does report improvement since starting the cream and she is applying it BID. Patient has been approved for Vyndaqel through [employer]. She has 4 doses on hand and was told that they would send it to her as soon as possible. Patient also reports she has enough Imuran to get her through this week. I have informed her that it is currently being processed by primary care pharmacy and that they should have it ready for  today.    Leonel Simmons, Pharm.D.  Pharmacy Resident, PGY-1   [facility]    "

## 2020-02-06 ENCOUNTER — TELEPHONE (OUTPATIENT)
Dept: INTERNAL MEDICINE | Facility: CLINIC | Age: 75
End: 2020-02-06

## 2020-02-06 ENCOUNTER — TELEPHONE (OUTPATIENT)
Dept: PHARMACY | Facility: CLINIC | Age: 75
End: 2020-02-06

## 2020-02-06 NOTE — TELEPHONE ENCOUNTER
----- Message from Portia Downs sent at 2/6/2020 10:55 AM CST -----  Contact: Patient [phone]  Patient is requesting a call about her transportation for her appt on 02/12. Patient is requesting a call asap.    Please call and advise.    Thank You

## 2020-02-06 NOTE — TELEPHONE ENCOUNTER
Please see if patient wants us to pre-order her car for 9am for her 2/12 appt day. Explain to her that if she isn't ready and waiting on the porch at 8:45am the car may leave her.    Otherwise we can call her the day of the appointment to schedule it.    WENDY Monterroso

## 2020-02-06 NOTE — TELEPHONE ENCOUNTER
Pt states that she needs transportation for her appt on 2/12/2020 and she would like a call to confirm her  time

## 2020-02-06 NOTE — TELEPHONE ENCOUNTER
refill attempt for vyndaqel, pt copay with insurance and financial assistance is ,499.27. pt is getting her prescription from the  now. will pend accordingly for Oncology.

## 2020-02-10 ENCOUNTER — TELEPHONE (OUTPATIENT)
Dept: INTERNAL MEDICINE | Facility: CLINIC | Age: 75
End: 2020-02-10

## 2020-02-10 NOTE — TELEPHONE ENCOUNTER
Spoke with patient and she stated she would like for the ride to pick her up at 8:45 am and she will be ready and waiting. Please pre-order car for patient on 2/12/2020. Patient verbally agreed.

## 2020-02-10 NOTE — TELEPHONE ENCOUNTER
----- Message from Candace Orlando sent at 2/10/2020 10:03 AM CST -----  Contact: [phone]  Patient is requesting a call from the office In regards to transportation for her upcoming appt.  Please advise, thanks

## 2020-02-11 NOTE — TELEPHONE ENCOUNTER
Confirmed with patient that she is getting Imuran pill-packed with [facility]. She also confirms NOT needing [facility] services for Vyndaqel - she is getting free drug with [employer]. She understands to call [facility] if she runs into any issues in the future. In the meantime, [facility] to discharge from our services. SWATHI

## 2020-02-12 ENCOUNTER — TELEPHONE (OUTPATIENT)
Dept: PRIMARY CARE CLINIC | Facility: CLINIC | Age: 75
End: 2020-02-12

## 2020-02-12 ENCOUNTER — OFFICE VISIT (OUTPATIENT)
Dept: GASTROENTEROLOGY | Facility: CLINIC | Age: 75
End: 2020-02-12
Payer: MEDICARE

## 2020-02-12 ENCOUNTER — INFUSION (OUTPATIENT)
Dept: INFECTIOUS DISEASES | Facility: HOSPITAL | Age: 75
End: 2020-02-12
Attending: INTERNAL MEDICINE
Payer: MEDICARE

## 2020-02-12 ENCOUNTER — OFFICE VISIT (OUTPATIENT)
Dept: PRIMARY CARE CLINIC | Facility: CLINIC | Age: 75
End: 2020-02-12
Attending: INTERNAL MEDICINE
Payer: MEDICARE

## 2020-02-12 ENCOUNTER — OUTPATIENT CASE MANAGEMENT (OUTPATIENT)
Dept: ADMINISTRATIVE | Facility: OTHER | Age: 75
End: 2020-02-12

## 2020-02-12 ENCOUNTER — TELEPHONE (OUTPATIENT)
Dept: ENDOSCOPY | Facility: HOSPITAL | Age: 75
End: 2020-02-12

## 2020-02-12 VITALS — HEIGHT: 64 IN | BODY MASS INDEX: 19.38 KG/M2

## 2020-02-12 VITALS
WEIGHT: 112.88 LBS | HEIGHT: 64 IN | HEART RATE: 58 BPM | BODY MASS INDEX: 19.27 KG/M2 | DIASTOLIC BLOOD PRESSURE: 70 MMHG | SYSTOLIC BLOOD PRESSURE: 155 MMHG

## 2020-02-12 VITALS
SYSTOLIC BLOOD PRESSURE: 140 MMHG | BODY MASS INDEX: 19.27 KG/M2 | HEIGHT: 64 IN | WEIGHT: 112.88 LBS | DIASTOLIC BLOOD PRESSURE: 74 MMHG | OXYGEN SATURATION: 95 % | HEART RATE: 63 BPM

## 2020-02-12 DIAGNOSIS — R19.7 DIARRHEA, UNSPECIFIED TYPE: ICD-10-CM

## 2020-02-12 DIAGNOSIS — Z90.49 S/P COLON RESECTION: ICD-10-CM

## 2020-02-12 DIAGNOSIS — R19.5 LOOSE BOWEL MOVEMENT: ICD-10-CM

## 2020-02-12 DIAGNOSIS — R13.10 DYSPHAGIA, UNSPECIFIED TYPE: ICD-10-CM

## 2020-02-12 DIAGNOSIS — Z85.038 HISTORY OF COLON CANCER: Primary | ICD-10-CM

## 2020-02-12 DIAGNOSIS — I50.33 ACUTE ON CHRONIC DIASTOLIC CONGESTIVE HEART FAILURE: ICD-10-CM

## 2020-02-12 DIAGNOSIS — I43 SENILE CARDIAC AMYLOIDOSIS: ICD-10-CM

## 2020-02-12 DIAGNOSIS — I50.32 CHRONIC DIASTOLIC HEART FAILURE: Primary | ICD-10-CM

## 2020-02-12 DIAGNOSIS — E85.4 SENILE CARDIAC AMYLOIDOSIS: ICD-10-CM

## 2020-02-12 DIAGNOSIS — E85.82 WILD-TYPE TRANSTHYRETIN-RELATED (ATTR) AMYLOIDOSIS: ICD-10-CM

## 2020-02-12 PROCEDURE — 99205 OFFICE O/P NEW HI 60 MIN: CPT | Mod: HCNC,S$GLB,, | Performed by: NURSE PRACTITIONER

## 2020-02-12 PROCEDURE — 99205 PR OFFICE/OUTPT VISIT, NEW, LEVL V, 60-74 MIN: ICD-10-PCS | Mod: HCNC,S$GLB,, | Performed by: NURSE PRACTITIONER

## 2020-02-12 PROCEDURE — 1101F PR PT FALLS ASSESS DOC 0-1 FALLS W/OUT INJ PAST YR: ICD-10-PCS | Mod: HCNC,CPTII,S$GLB, | Performed by: INTERNAL MEDICINE

## 2020-02-12 PROCEDURE — 1159F MED LIST DOCD IN RCRD: CPT | Mod: HCNC,S$GLB,, | Performed by: NURSE PRACTITIONER

## 2020-02-12 PROCEDURE — 1126F AMNT PAIN NOTED NONE PRSNT: CPT | Mod: HCNC,S$GLB,, | Performed by: INTERNAL MEDICINE

## 2020-02-12 PROCEDURE — 99999 PR PBB SHADOW E&M-EST. PATIENT-LVL V: ICD-10-PCS | Mod: PBBFAC,HCNC,, | Performed by: NURSE PRACTITIONER

## 2020-02-12 PROCEDURE — 99499 UNLISTED E&M SERVICE: CPT | Mod: S$GLB,,, | Performed by: INTERNAL MEDICINE

## 2020-02-12 PROCEDURE — 99999 PR PBB SHADOW E&M-EST. PATIENT-LVL V: CPT | Mod: PBBFAC,HCNC,, | Performed by: NURSE PRACTITIONER

## 2020-02-12 PROCEDURE — 1159F PR MEDICATION LIST DOCUMENTED IN MEDICAL RECORD: ICD-10-PCS | Mod: HCNC,S$GLB,, | Performed by: NURSE PRACTITIONER

## 2020-02-12 PROCEDURE — 1126F PR PAIN SEVERITY QUANTIFIED, NO PAIN PRESENT: ICD-10-PCS | Mod: HCNC,S$GLB,, | Performed by: INTERNAL MEDICINE

## 2020-02-12 PROCEDURE — 1125F AMNT PAIN NOTED PAIN PRSNT: CPT | Mod: HCNC,S$GLB,, | Performed by: NURSE PRACTITIONER

## 2020-02-12 PROCEDURE — 99215 OFFICE O/P EST HI 40 MIN: CPT | Mod: HCNC,S$GLB,, | Performed by: INTERNAL MEDICINE

## 2020-02-12 PROCEDURE — 1101F PT FALLS ASSESS-DOCD LE1/YR: CPT | Mod: HCNC,CPTII,S$GLB, | Performed by: INTERNAL MEDICINE

## 2020-02-12 PROCEDURE — 99499 RISK ADDL DX/OHS AUDIT: ICD-10-PCS | Mod: S$GLB,,, | Performed by: INTERNAL MEDICINE

## 2020-02-12 PROCEDURE — 1125F PR PAIN SEVERITY QUANTIFIED, PAIN PRESENT: ICD-10-PCS | Mod: HCNC,S$GLB,, | Performed by: NURSE PRACTITIONER

## 2020-02-12 PROCEDURE — 1159F MED LIST DOCD IN RCRD: CPT | Mod: HCNC,S$GLB,, | Performed by: INTERNAL MEDICINE

## 2020-02-12 PROCEDURE — 1101F PT FALLS ASSESS-DOCD LE1/YR: CPT | Mod: HCNC,CPTII,S$GLB, | Performed by: NURSE PRACTITIONER

## 2020-02-12 PROCEDURE — 1101F PR PT FALLS ASSESS DOC 0-1 FALLS W/OUT INJ PAST YR: ICD-10-PCS | Mod: HCNC,CPTII,S$GLB, | Performed by: NURSE PRACTITIONER

## 2020-02-12 PROCEDURE — 99215 PR OFFICE/OUTPT VISIT, EST, LEVL V, 40-54 MIN: ICD-10-PCS | Mod: HCNC,S$GLB,, | Performed by: INTERNAL MEDICINE

## 2020-02-12 PROCEDURE — 1159F PR MEDICATION LIST DOCUMENTED IN MEDICAL RECORD: ICD-10-PCS | Mod: HCNC,S$GLB,, | Performed by: INTERNAL MEDICINE

## 2020-02-12 NOTE — PROGRESS NOTES
Patient Ewelina Arnold, MRN [id number], was dependent on dialysis (ICD10 Z99.2) at the time of this visit on 2/12/20. This addendum is made to the medical record on 02/12/2020.

## 2020-02-12 NOTE — PROGRESS NOTES
Primary Care Provider Appointment  SHARED NOTE: Romulo Pond (MS-4), Dr Johns (Attending)    Subjective:      Patient ID: Ewelina Arnold is a 74 y.o. female with amyloidosis, HTN, MDD, pHTN, CKD.    Chief Complaint: Follow-up and Headache    Today she has a headache, she has taken two tylenol. The headache is bilateral in her frontal area. She is yawning a lot today and states that the tylenol makes her sleepy.    She has had two episodes of diarrhea over the last two days. She does endorses some stomach pain. She has tried imodium and this has helped with the diarrhea and abdominal pain. She denies any fevers. She has also had bowel incontinence about a week ago, she felt like she needed to pass gas, and ended up passing some stool. This has happened twice before. Sees GI today.    She is doing well with her treatment for amyloidosis (vindaquel), with significant improvement in functional status. She missed her treatment for 2 days due to her abimael running out. She has an appointment with Dr. Burdick from transplant cardiology on 02/17/20 for her CHF cardiac amyloidosis and aortic stenosis.    Has ACI, is on aranesp infusions q 2-3 weeks with Nephro. She has one today. Hg is trending down, but remains >10. Labs have been collected today and are in process.     Her lupus is stable on azothioprine. Is followed by Dr Ramirez. Her nodules have been stable. She has an appointment with rheumatology on 04/28/20. She is using the steroid cream on her rash and this appears to have helped the rash stabilize.    She endorses having low mood about once a week. Her mood lifts and does not persist during the day. She says her mood is low because she is inside all the time. Has poor sleep hygiene.    She is doing pill packing and is happy to continue, she does not need a refill, recently picked up her packs. She is taking prn doxazosin which she requested to be removed from her pill packs.    Adherence packed for 28  days using Dispill:      Morning card:   Aspirin 81 mg   Azathioprine 50 mg  Carvedilol 25 mg   Citalopram 20 mg (1&1/2 tabs)   Famotidine 20 mg   Levothyroxine 75 mcg   Nifedipine ER Osmotic 60 mg   Sodium Bicarb 650 mg   Vitamin D 06058 IU (Once weekly)      Evening card:   Atorvastatin 80 mg   Azathioprine 50 mg  Carvedilol 25 mg   Nifedipine ER Osmotic 60 mg   Sodium Bicarb 650 mg       Past Surgical History:   Procedure Laterality Date    CARDIAC CATHETERIZATION  07/27/2011    x1    CHOLECYSTECTOMY  1999    COLON SURGERY  2000 & 2003    partial removal for CA    COLONOSCOPY N/A 4/14/2016    Procedure: COLONOSCOPY;  Surgeon: Jose Steen MD;  Location: [facility] ENDO (4TH FLR);  Service: Endoscopy;  Laterality: N/A;  prep 2 days prior light meals only/clear liquid day before  and 4 dulcolax tabs (5 mgs) at noon    COLONOSCOPY N/A 9/14/2017    Procedure: COLONOSCOPY;  Surgeon: Jose Steen MD;  Location: 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    OOPHORECTOMY      removed one    Peripheral Iridotomy both eyes  1994    laser angle correction    THYROIDECTOMY, PARTIAL  2006    to remove two nodules and one-half thyroid       Past Medical History:   Diagnosis Date    Acute hypoxemic respiratory failure 4/28/2018    Acute on chronic diastolic congestive heart failure 11/17/2017    Allergy     Anatomical narrow angle glaucoma     Anemia     States diagnosed about a month ago    Aortic atherosclerosis     Arthritis     Atherosclerosis of native coronary artery with angina pectoris 1/3/2020    Cataract     CHF (congestive heart failure)     Chronic kidney disease     Chronic sciatica of left side     Right lower back pain due to sciatica    Coronary artery disease     Depression     Dry eyes     Gastroenteritis,  acute     GERD (gastroesophageal reflux disease)     History of colon cancer     Hyperaldosteronism     Hyperlipidemia     Hypertension     Hypothyroidism     Keloid cicatrix     Left ventricular diastolic dysfunction with preserved systolic function     Lupus (systemic lupus erythematosus) 2012    Lupus (systemic lupus erythematosus)     Malnutrition 2017    Osteoarthritis of cervical spine 2012    Osteopenia     PAD (peripheral artery disease)     FRAN (renal artery stenosis)        Social History     Socioeconomic History    Marital status: Single     Spouse name: Not on file    Number of children: Not on file    Years of education: Not on file    Highest education level: Not on file   Occupational History     Employer: Retired    Social Needs    Financial resource strain: Not very hard    Food insecurity:     Worry: Never true     Inability: Never true    Transportation needs:     Medical: Yes     Non-medical: Yes   Tobacco Use    Smoking status: Former Smoker     Packs/day: 1.50     Years: 30.00     Pack years: 45.00     Types: Cigarettes     Start date:      Last attempt to quit: 3/13/1985     Years since quittin.9    Smokeless tobacco: Never Used   Substance and Sexual Activity    Alcohol use: No    Drug use: No    Sexual activity: Not Currently     Partners: Male     Birth control/protection: Abstinence   Lifestyle    Physical activity:     Days per week: Not on file     Minutes per session: Not on file    Stress: Not on file   Relationships    Social connections:     Talks on phone: Not on file     Gets together: Not on file     Attends Anabaptism service: Not on file     Active member of club or organization: Not on file     Attends meetings of clubs or organizations: Not on file     Relationship status: Not on file   Other Topics Concern    Are you pregnant or think you may be? No    Breast-feeding No   Social History Narrative    Not on file  "      Review of Systems   Constitutional: Negative for appetite change, chills and fever.   HENT: Negative for congestion, rhinorrhea, sinus pain and sore throat.    Eyes: Negative for photophobia, redness and visual disturbance.   Respiratory: Negative for cough and shortness of breath.    Cardiovascular: Negative for chest pain, palpitations and leg swelling.   Gastrointestinal: Positive for abdominal pain and diarrhea. Negative for blood in stool, constipation and vomiting.   Genitourinary: Negative for dysuria, frequency, hematuria and urgency.   Neurological: Positive for headaches. Negative for dizziness, syncope and light-headedness.   Psychiatric/Behavioral: Positive for dysphoric mood and sleep disturbance (feeling excessively sleepy). Negative for confusion. The patient is not nervous/anxious.        Objective:   BP (!) 140/74   Pulse 63   Ht 5' 4" (1.626 m)   Wt 51.2 kg (112 lb 14 oz)   LMP  (LMP Unknown) Comment: 99  SpO2 95%   BMI 19.38 kg/m²     Physical Exam   Constitutional: She is oriented to person, place, and time.   Thin, yawning a lot during conversation, appears to be low of energy   HENT:   Head: Normocephalic and atraumatic.   Neck: Normal range of motion.   Cardiovascular: Normal rate and regular rhythm.   Murmur (holosystolic) heard.  Pulmonary/Chest: Effort normal and breath sounds normal.   Abdominal: Soft. Bowel sounds are normal. There is no tenderness.   Musculoskeletal: She exhibits deformity. She exhibits no edema.   Neurological: She is alert and oriented to person, place, and time.   Skin: Skin is warm and dry.   Psychiatric: Her behavior is normal. Thought content normal.   Reduced affect   Vitals reviewed.    Lab Results   Component Value Date    WBC 2.38 (L) 01/14/2020    HGB 11.0 (L) 01/14/2020    HGB 11.0 (L) 01/14/2020    HCT 36.7 (L) 01/14/2020    HCT 36.7 (L) 01/14/2020     01/14/2020    CHOL 181 08/28/2018    TRIG 54 08/28/2018    HDL 66 08/28/2018    ALT 7 " (L) 01/14/2020    AST 28 01/14/2020     01/14/2020    K 4.1 01/14/2020     01/14/2020    CREATININE 3.4 (H) 01/14/2020    BUN 78 (H) 01/14/2020    CO2 27 01/14/2020    TSH 1.798 01/25/2019    INR 1.0 09/05/2018    GLUF 79 12/02/2011    HGBA1C 5.2 09/25/2018       Current Outpatient Medications on File Prior to Visit   Medication Sig Dispense Refill    atorvastatin (LIPITOR) 80 MG tablet TAKE 1 TABLET EVERY DAY 90 tablet 1    augmented betamethasone dipropionate (DIPROLENE-AF) 0.05 % cream Apply topically 2 (two) times daily. 50 g 3    azaTHIOprine (IMURAN) 50 mg Tab Take 1 tablet (50 mg total) by mouth 2 (two) times daily. 60 tablet 2    betamethasone dipropionate (DIPROLENE) 0.05 % ointment Apply topically 2 (two) times daily to lupus rashes as needed 45 g 3    butalbital-aspirin-caffeine -40 mg (FIORINAL) -40 mg Cap Take 1 capsule by mouth as needed.       carvedilol (COREG) 25 MG tablet Take 1 tablet (25 mg total) by mouth 2 (two) times daily with meals. 180 tablet 3    cholestyramine-aspartame (QUESTRAN LIGHT) 4 gram PwPk Take 4 g by mouth as needed.      citalopram (CELEXA) 20 MG tablet TAKE 1 AND 1/2 TABLETS ONE TIME DAILY 135 tablet 0    clobetasol (TEMOVATE) 0.05 % cream Apply topically 2 (two) times daily. To lupus rashes x 2 weeks, then weekends only 60 g 1    clobetasol (TEMOVATE) 0.05 % external solution Apply topically 2 (two) times daily. (Patient taking differently: Apply topically 2 (two) times daily as needed. ) 50 mL 2    doxazosin (CARDURA) 2 MG tablet Take 1 tablet (2 mg total) by mouth nightly as needed (SBP > 150). 90 tablet 3    ergocalciferol (VITAMIN D2) 50,000 unit Cap Take 1 capsule (50,000 Units total) by mouth every 7 days. 12 capsule 3    estrogens, conjugated, (PREMARIN) 0.45 MG tablet Take 42.5 mg by mouth.      famotidine (PEPCID) 20 MG tablet TAKE 1 TABLET (20 MG TOTAL) BY MOUTH ONCE DAILY. 90 tablet 0    fluocinonide (LIDEX) 0.05 % ointment  Apply topically 2 (two) times daily. To lupus rashes PRN 60 g 2    furosemide (LASIX) 40 MG tablet Take 1 tablet (40 mg total) by mouth daily as needed (swelling and shortness of breath). TAKE 1 TABLET DAILY AS NEEDED FOR WEIGHT GAIN, SHORTNESS OF BREATH 90 tablet 3    L.ACIDOPHILUS-BIFIDO.LONGUM ORAL Take by mouth.      L.ACIDOPHILUS-BIFIDO.LONGUM ORAL Take by mouth.      latanoprost 0.005 % ophthalmic solution INSTILL 1 DROP INTO BOTH EYES EVERY DAY 3 Bottle 12    levothyroxine (SYNTHROID) 75 MCG tablet TAKE 1 TABLET (75 MCG TOTAL) BY MOUTH BEFORE BREAKFAST 90 tablet 0    meclizine (ANTIVERT) 25 mg tablet Take 1 tablet (25 mg total) by mouth 3 (three) times daily as needed for Dizziness or Nausea. 90 tablet 0    NIFEdipine (PROCARDIA-XL) 60 MG (OSM) 24 hr tablet Take 1 tablet (60 mg total) by mouth 2 (two) times daily. 180 tablet 3    nitroGLYCERIN (NITROSTAT) 0.4 MG SL tablet Place 1 tablet (0.4 mg total) under the tongue every 5 (five) minutes as needed for Chest pain. 25 tablet 3    nystatin (MYCOSTATIN) powder Apply topically as needed.       sodium bicarbonate 650 MG tablet TAKE 1 TABLET TWICE DAILY 180 tablet 3    TENS units Sravanthi by Misc.(Non-Drug; Combo Route) route.      triamcinolone acetonide 0.5% (KENALOG) 0.5 % Crea Apply topically as needed.       triamcinolone acetonide 0.5% (KENALOG) 0.5 % Crea Apply 0.5 application topically 2 (two) times daily.      aspirin (ECOTRIN) 81 MG EC tablet Take 1 tablet (81 mg total) by mouth once daily. 30 tablet 0    calcipotriene 0.005 % sclp soln Apply 1 application topically 2 (two) times daily. (Patient taking differently: Apply 1 application topically 2 (two) times daily as needed. ) 60 mL 3    metOLazone (ZAROXOLYN) 2.5 MG tablet Take 2 tablets (5 mg total) by mouth once daily. (Patient taking differently: Take 5 mg by mouth as needed. ) 180 tablet 3     No current facility-administered medications on file prior to visit.        Assessment:   74  y.o. female with multiple co-morbid illnesses here to establish care with new PCP and continue work-up of chronic issues notably amyloidosis, HTN, MDD, pHTN, CKD.     Plan:     Problem List Items Addressed This Visit        Cardiac/Vascular    Chronic diastolic heart failure - Primary    Relevant Orders    Ambulatory referral/consult to Home Health       Immunology/Multi System    Senile cardiac amyloidosis    Relevant Orders    Ambulatory referral/consult to Home Health    Wild-type transthyretin-related (ATTR) amyloidosis    Relevant Orders    Ambulatory referral/consult to Home Health      Other Visit Diagnoses     Diarrhea, unspecified type        Relevant Orders    Ambulatory referral/consult to Home Health    Acute on chronic diastolic congestive heart failure        Relevant Orders    Ambulatory referral/consult to Home Health          Health Maintenance       Date Due Completion Date    Shingles Vaccine (2 of 3) 01/09/2012 11/14/2011    Mammogram 09/18/2020 9/18/2019    DEXA SCAN 01/08/2022 1/8/2019    Colonoscopy 09/14/2022 9/14/2017    Lipid Panel 08/28/2023 8/28/2018    TETANUS VACCINE 06/29/2026 6/29/2016          Follow up in about 4 weeks (around 3/11/2020). Total face-to-face time was 60 min, >50% of this was spent on counseling and coordination of care. The following issues were discussed: with amyloidosis, HTN, MDD, pHTN, CKD.    Romulo Pond  MS-4    Amarilys Johns MD/MPH  Internal Medicine  [facility]  [phone] [facility]

## 2020-02-12 NOTE — PATIENT INSTRUCTIONS
TODAY:  - Home Health PT   + you need more physical activity  - stop taking lasix close to bedtime  - F/U in 4 weeks  - go to bed at the same time every day   + turn the tv off at 10:30pm    + aim to be in bed by 11pm   + get out of bed at 9am and don't get back into bed

## 2020-02-12 NOTE — PROGRESS NOTES
Documentation:  [facility] LMSW transported pt via wheelchair from the [facility] clinic in the [facility] to the GI clinic on the fourth floor of [facility] main campus.

## 2020-02-12 NOTE — TELEPHONE ENCOUNTER
Spoke w' [facility] they have availability Friday for her they will contact pt and get her on their schedule

## 2020-02-12 NOTE — LETTER
February 12, 2020      Curt Epperson MD  [address]  [place] [address]           [facility]  [address]  [place] [address]  Phone: [phone]  Fax: [phone]          Patient: Ewelina Arnold   MR Number: [id number]   YOB: 1945   Date of Visit: 2/12/2020       Dear Dr. Curt Epperson:    Thank you for referring Ewelina Arnold to me for evaluation. Attached you will find relevant portions of my assessment and plan of care.    If you have questions, please do not hesitate to call me. I look forward to following Ewelina Arnold along with you.    Sincerely,    Amarilys Johns MD    Enclosure  CC:  No Recipients    If you would like to receive this communication electronically, please contact [email or web address] or [phone] to request more information on [employer] Link access.    For providers and/or their staff who would like to refer a patient to [facility], please contact us through our one-stop-shop provider referral line, [facility], at [phone].    If you feel you have received this communication in error or would no longer like to receive these types of communications, please e-mail [email or web address]

## 2020-02-12 NOTE — TELEPHONE ENCOUNTER
DEJUAN Singh- Patient has numerous uncontrolled chronic conditions and low functional status. I spoke with her about the upcoming EGD, and with shared decision making she is electing to not pursue this at this time.    Please let me know if you want to discuss further.    Thanks,  Amarilys Johns MD/MPH  Internal Medicine  [facility]  [phone] [facility]

## 2020-02-12 NOTE — TELEPHONE ENCOUNTER
MIKA and [facility] is having a delay with their faxes it will go through but it is taking a while I will have to fax the order over to them

## 2020-02-12 NOTE — LETTER
February 12, 2020      Amarilys Johns MD  [address]  [facility] [address]           [facility]  [address]  [facility] [address]  Phone: [phone]  Fax: [phone]          Patient: Ewelina Arnold   MR Number: [id number]   YOB: 1945   Date of Visit: 2/12/2020       Dear Dr. Amarilys Johns:    Thank you for referring Ewelina Arnold to me for evaluation. Attached you will find relevant portions of my assessment and plan of care.    If you have questions, please do not hesitate to call me. I look forward to following Ewelina Arnold along with you.    Sincerely,    Charity Singh, DEJUAN    Enclosure  CC:  No Recipients    If you would like to receive this communication electronically, please contact [email or web address] or [phone] to request more information on [employer] Link access.    For providers and/or their staff who would like to refer a patient to [facility], please contact us through our one-stop-shop provider referral line, [facility], at [phone].    If you feel you have received this communication in error or would no longer like to receive these types of communications, please e-mail [email or web address]

## 2020-02-12 NOTE — PROGRESS NOTES
[facility] Consultation Note    Reason for Consult:  The primary encounter diagnosis was History of colon cancer. Diagnoses of S/P colon resection, Dysphagia, unspecified type, and Loose bowel movement were also pertinent to this visit.    PCP:   Amarilys Johns   [address] [facility] [id number]    Referring MD:  Amarilys Johns Md  [address] [place] [address]    Initial History of Present Illness (HPI):  This is a 74 y.o. female here for evaluation of diarrhea, dysphagia and fecal incontinence. New patient    Diarrhea every now and the. She had it the past two days none today. And then maybe 4 weeks ago . Usually can take 2-3 tablets of imodium and this resolves the diarrhea. Does not need imodium every day.  Twice she has had episodes of passing gas with an accident.  S/p colon resection d/t Colon Ca. Denies hematochezia or mucous  Abdominal pain - no  Reflux - no  Dysphagia - to water  Bowel habits - normal  GI bleeding - none  NSAID usage - none  Two episodes of regurgitation of water. Foods do not get stuck in her throat.   Denies heart burning. Had nausea last light, but drank water and baking   Takes pepcid every day since she was in the hospital  +Amloidosis, Kidney disease    ROS:  Constitutional: No fevers, chills, No weight loss  ENT:  See HPI  CV: No chest pain, no palpitation  Pulm: No cough, No shortness of breath, no wheezing  Ophtho: No vision changes  GI: see HPI  Derm: + SLE rash, no itching  Heme:+  bruising  MSK: No significant arthritis  : No dysuria, No hematuria  Neuro: No syncope, No seizure, no strokes  Psych: No uncontrolled anxiety, No uncontrolled depression    Medical History:  has a past medical history of Acute hypoxemic respiratory failure (4/28/2018), Acute on chronic diastolic congestive heart failure (11/17/2017), Allergy, Anatomical narrow angle glaucoma, Anemia, Aortic atherosclerosis, Arthritis, Atherosclerosis of  native coronary artery with angina pectoris (1/3/2020), Cataract, CHF (congestive heart failure), Chronic kidney disease, Chronic sciatica of left side, Coronary artery disease, Depression, Dry eyes, Gastroenteritis, acute, GERD (gastroesophageal reflux disease), History of colon cancer, Hyperaldosteronism, Hyperlipidemia, Hypertension, Hypothyroidism, Keloid cicatrix, Left ventricular diastolic dysfunction with preserved systolic function, Lupus (systemic lupus erythematosus) (8/17/2012), Lupus (systemic lupus erythematosus), Malnutrition (5/16/2017), Osteoarthritis of cervical spine (8/17/2012), Osteopenia, PAD (peripheral artery disease), and FARN (renal artery stenosis).    Surgical History:  has a past surgical history that includes Hernia repair; Cardiac catheterization (07/27/2011); Peripheral Iridotomy both eyes (1994); Oophorectomy; Hand surgery (Bilateral, 1996 & 1997); Hysterectomy (1983); Cholecystectomy (1999); Thyroidectomy, partial (2006); Colonoscopy (N/A, 4/14/2016); Eye surgery; Colonoscopy (N/A, 9/14/2017); Nephrectomy (1985); and Colon surgery (2000 & 2003).    Family History: family history includes Diabetes in her maternal aunt, maternal grandmother, and son; Glaucoma in her maternal grandmother; Heart attack in her mother; Heart disease in her father; Hypertension in her brother, father, mother, and sister; Kidney disease in her brother; Kidney failure in her brother; No Known Problems in her daughter, maternal grandfather, paternal grandfather, and paternal grandmother..     Social History:  reports that she quit smoking about 34 years ago. Her smoking use included cigarettes. She started smoking about 65 years ago. She has a 45.00 pack-year smoking history. She has never used smokeless tobacco. She reports that she does not drink alcohol or use drugs.    Review of patient's allergies indicates:   Allergen Reactions    Ace inhibitors Swelling     Lips Swelling    Hydralazine analogues Other  (See Comments)     Lupus exacerbation    Imodium [loperamide] Nausea And Vomiting    Vioxx [rofecoxib] Swelling      lips swelling    Bactrim [sulfamethoxazole-trimethoprim]      Pt would like this medication to be added due to elevation of creatinine with single kidney.    Clonidine Hallucinations     Hallucinations    Lactose     Arthrotec 50 [diclofenac-misoprostol]      Unknown    Bentyl [dicyclomine] Other (See Comments)     Not effective    Doxycycline Nausea Only    Lomotil [diphenoxylate-atropine] Nausea And Vomiting     Stomach cramps with vomitting    Lozol [indapamide] Rash    Norvasc [amlodipine]      Not tolerated    Tramadol Nausea Only       Medication List with Changes/Refills   Current Medications    ASPIRIN (ECOTRIN) 81 MG EC TABLET    Take 1 tablet (81 mg total) by mouth once daily.    ATORVASTATIN (LIPITOR) 80 MG TABLET    TAKE 1 TABLET EVERY DAY    AUGMENTED BETAMETHASONE DIPROPIONATE (DIPROLENE-AF) 0.05 % CREAM    Apply topically 2 (two) times daily.    AZATHIOPRINE (IMURAN) 50 MG TAB    Take 1 tablet (50 mg total) by mouth 2 (two) times daily.    BETAMETHASONE DIPROPIONATE (DIPROLENE) 0.05 % OINTMENT    Apply topically 2 (two) times daily to lupus rashes as needed    BUTALBITAL-ASPIRIN-CAFFEINE -40 MG (FIORINAL) -40 MG CAP    Take 1 capsule by mouth as needed.     CALCIPOTRIENE 0.005 % SCLP SOLN    Apply 1 application topically 2 (two) times daily.    CARVEDILOL (COREG) 25 MG TABLET    Take 1 tablet (25 mg total) by mouth 2 (two) times daily with meals.    CHOLESTYRAMINE-ASPARTAME (QUESTRAN LIGHT) 4 GRAM PWPK    Take 4 g by mouth as needed.    CITALOPRAM (CELEXA) 20 MG TABLET    TAKE 1 AND 1/2 TABLETS ONE TIME DAILY    CLOBETASOL (TEMOVATE) 0.05 % CREAM    Apply topically 2 (two) times daily. To lupus rashes x 2 weeks, then weekends only    CLOBETASOL (TEMOVATE) 0.05 % EXTERNAL SOLUTION    Apply topically 2 (two) times daily.    DOXAZOSIN (CARDURA) 2 MG TABLET    Take 1  "tablet (2 mg total) by mouth nightly as needed (SBP > 150).    ERGOCALCIFEROL (VITAMIN D2) 50,000 UNIT CAP    Take 1 capsule (50,000 Units total) by mouth every 7 days.    ESTROGENS, CONJUGATED, (PREMARIN) 0.45 MG TABLET    Take 42.5 mg by mouth.    FAMOTIDINE (PEPCID) 20 MG TABLET    TAKE 1 TABLET (20 MG TOTAL) BY MOUTH ONCE DAILY.    FLUOCINONIDE (LIDEX) 0.05 % OINTMENT    Apply topically 2 (two) times daily. To lupus rashes PRN    FUROSEMIDE (LASIX) 40 MG TABLET    Take 1 tablet (40 mg total) by mouth daily as needed (swelling and shortness of breath). TAKE 1 TABLET DAILY AS NEEDED FOR WEIGHT GAIN, SHORTNESS OF BREATH    L.ACIDOPHILUS-BIFIDO.LONGUM ORAL    Take by mouth.    L.ACIDOPHILUS-BIFIDO.LONGUM ORAL    Take by mouth.    LATANOPROST 0.005 % OPHTHALMIC SOLUTION    INSTILL 1 DROP INTO BOTH EYES EVERY DAY    LEVOTHYROXINE (SYNTHROID) 75 MCG TABLET    TAKE 1 TABLET (75 MCG TOTAL) BY MOUTH BEFORE BREAKFAST    MECLIZINE (ANTIVERT) 25 MG TABLET    Take 1 tablet (25 mg total) by mouth 3 (three) times daily as needed for Dizziness or Nausea.    METOLAZONE (ZAROXOLYN) 2.5 MG TABLET    Take 2 tablets (5 mg total) by mouth once daily.    NIFEDIPINE (PROCARDIA-XL) 60 MG (OSM) 24 HR TABLET    Take 1 tablet (60 mg total) by mouth 2 (two) times daily.    NITROGLYCERIN (NITROSTAT) 0.4 MG SL TABLET    Place 1 tablet (0.4 mg total) under the tongue every 5 (five) minutes as needed for Chest pain.    NYSTATIN (MYCOSTATIN) POWDER    Apply topically as needed.     SODIUM BICARBONATE 650 MG TABLET    TAKE 1 TABLET TWICE DAILY    TENS UNITS PAULINA    by Misc.(Non-Drug; Combo Route) route.    TRIAMCINOLONE ACETONIDE 0.5% (KENALOG) 0.5 % CREA    Apply topically as needed.     TRIAMCINOLONE ACETONIDE 0.5% (KENALOG) 0.5 % CREA    Apply 0.5 application topically 2 (two) times daily.         Objective Findings:    Vital Signs:  BP (!) 155/70 (BP Location: Left arm)   Pulse (!) 58   Ht 5' 4" (1.626 m)   Wt 51.2 kg (112 lb 14 oz)   LMP  " (LMP Unknown) Comment: 99  BMI 19.38 kg/m²   Body mass index is 19.38 kg/m².    Physical Exam:  General Appearance: Well appearing in no acute distress  Eyes:    No scleral icterus  ENT:  No lesions or masses   Lungs: CTA bilaterally, no wheezes, no rhonchi, no rales  Heart:  S1, S2 normal, +murmur  Musculoskeletal:  No major joint deformities  Skin: No petechiae or rash on exposed skin areas  Neurologic:  Alert and oriented x4  Psychiatric:  Normal speech mentation and affect    Labs:  Lab Results   Component Value Date    WBC 2.38 (L) 01/14/2020    HGB 11.0 (L) 01/14/2020    HGB 11.0 (L) 01/14/2020    HCT 36.7 (L) 01/14/2020    HCT 36.7 (L) 01/14/2020     01/14/2020    CHOL 181 08/28/2018    TRIG 54 08/28/2018    HDL 66 08/28/2018    ALT 7 (L) 01/14/2020    AST 28 01/14/2020     01/14/2020    K 4.1 01/14/2020     01/14/2020    CREATININE 3.4 (H) 01/14/2020    BUN 78 (H) 01/14/2020    CO2 27 01/14/2020    TSH 1.798 01/25/2019    INR 1.0 09/05/2018    GLUF 79 12/02/2011    HGBA1C 5.2 09/25/2018           Imaging reviewed:  none  Endoscopy reviewed:    Colonoscopy 2017 - Patent end-to-end colo-rectal anastomosis,                         characterized by healthy appearing mucosa.                        - The examination was otherwise normal.  Medical Decision Making:    Assessment:    Ewelina Arnold is a 74 y.o. female here for:    1. History of colon cancer    2. S/P colon resection    3. Dysphagia, unspecified type    4. Loose bowel movement      Loose bowel movements come and go since her colon resection for colon cancer about 8 years ago.  According to her last colonoscopy it was a right colon to rectal resection.  The loose bowel movement are not consistent so I do not think this is something to workup at this time as the anatomy of her colon is likely the culprit of the symptoms.  However her dysphagia is new since her last EGD and does need evaluation at this time      Recommendations:  1. EGD.  High risk decision making - pt high risk anesthesia patient. Second floor scheduling due to multiple comorbidities.  2.  Continue the Pepcid.  She does have 1 kidney with kidney disease so would like to hold off on PPI therapy for this reason.    Follow-up pending EGD    Order summary:  Orders Placed This Encounter    Case request GI: EGD (ESOPHAGOGASTRODUODENOSCOPY)         Thank you for allowing me to participate in the care of PAIGE Nash

## 2020-02-12 NOTE — TELEPHONE ENCOUNTER
Please let patient know that I would like to speak with her before she moves forward with the EGD.    Thanks,  KJ

## 2020-02-13 PROCEDURE — G0180 MD CERTIFICATION HHA PATIENT: HCPCS | Mod: ,,, | Performed by: INTERNAL MEDICINE

## 2020-02-13 PROCEDURE — G0180 PR HOME HEALTH MD CERTIFICATION: ICD-10-PCS | Mod: ,,, | Performed by: INTERNAL MEDICINE

## 2020-02-17 ENCOUNTER — INFUSION (OUTPATIENT)
Dept: INFECTIOUS DISEASES | Facility: HOSPITAL | Age: 75
End: 2020-02-17
Attending: INTERNAL MEDICINE
Payer: MEDICARE

## 2020-02-17 ENCOUNTER — OFFICE VISIT (OUTPATIENT)
Dept: TRANSPLANT | Facility: CLINIC | Age: 75
End: 2020-02-17
Payer: MEDICARE

## 2020-02-17 VITALS
BODY MASS INDEX: 19.5 KG/M2 | SYSTOLIC BLOOD PRESSURE: 161 MMHG | WEIGHT: 114.19 LBS | HEIGHT: 64 IN | DIASTOLIC BLOOD PRESSURE: 71 MMHG

## 2020-02-17 VITALS
WEIGHT: 115.5 LBS | DIASTOLIC BLOOD PRESSURE: 70 MMHG | HEART RATE: 58 BPM | HEIGHT: 64 IN | SYSTOLIC BLOOD PRESSURE: 158 MMHG | BODY MASS INDEX: 19.72 KG/M2

## 2020-02-17 DIAGNOSIS — N18.4 ANEMIA OF CHRONIC RENAL FAILURE, STAGE 4 (SEVERE): ICD-10-CM

## 2020-02-17 DIAGNOSIS — E85.82 WILD-TYPE TRANSTHYRETIN-RELATED (ATTR) AMYLOIDOSIS: ICD-10-CM

## 2020-02-17 DIAGNOSIS — D63.1 ANEMIA OF CHRONIC RENAL FAILURE, STAGE 4 (SEVERE): ICD-10-CM

## 2020-02-17 DIAGNOSIS — N18.5 ANEMIA IN STAGE 5 CHRONIC KIDNEY DISEASE: Primary | ICD-10-CM

## 2020-02-17 DIAGNOSIS — I13.0 CARDIORENAL SYNDROME, STAGE 1-4 OR UNSPECIFIED CHRONIC KIDNEY DISEASE, WITH HEART FAILURE: ICD-10-CM

## 2020-02-17 DIAGNOSIS — I35.0 AORTIC STENOSIS, MODERATE: ICD-10-CM

## 2020-02-17 DIAGNOSIS — D63.1 ANEMIA IN STAGE 5 CHRONIC KIDNEY DISEASE: Primary | ICD-10-CM

## 2020-02-17 PROCEDURE — 99215 OFFICE O/P EST HI 40 MIN: CPT | Mod: HCNC,S$GLB,, | Performed by: INTERNAL MEDICINE

## 2020-02-17 PROCEDURE — 63600175 PHARM REV CODE 636 W HCPCS: Mod: JG,EC,HCNC | Performed by: INTERNAL MEDICINE

## 2020-02-17 PROCEDURE — 1126F AMNT PAIN NOTED NONE PRSNT: CPT | Mod: HCNC,S$GLB,, | Performed by: INTERNAL MEDICINE

## 2020-02-17 PROCEDURE — 96372 THER/PROPH/DIAG INJ SC/IM: CPT | Mod: HCNC

## 2020-02-17 PROCEDURE — 99215 PR OFFICE/OUTPT VISIT, EST, LEVL V, 40-54 MIN: ICD-10-PCS | Mod: HCNC,S$GLB,, | Performed by: INTERNAL MEDICINE

## 2020-02-17 PROCEDURE — 1159F PR MEDICATION LIST DOCUMENTED IN MEDICAL RECORD: ICD-10-PCS | Mod: HCNC,S$GLB,, | Performed by: INTERNAL MEDICINE

## 2020-02-17 PROCEDURE — 1101F PT FALLS ASSESS-DOCD LE1/YR: CPT | Mod: HCNC,CPTII,S$GLB, | Performed by: INTERNAL MEDICINE

## 2020-02-17 PROCEDURE — 1101F PR PT FALLS ASSESS DOC 0-1 FALLS W/OUT INJ PAST YR: ICD-10-PCS | Mod: HCNC,CPTII,S$GLB, | Performed by: INTERNAL MEDICINE

## 2020-02-17 PROCEDURE — 1159F MED LIST DOCD IN RCRD: CPT | Mod: HCNC,S$GLB,, | Performed by: INTERNAL MEDICINE

## 2020-02-17 PROCEDURE — 1126F PR PAIN SEVERITY QUANTIFIED, NO PAIN PRESENT: ICD-10-PCS | Mod: HCNC,S$GLB,, | Performed by: INTERNAL MEDICINE

## 2020-02-17 PROCEDURE — 99999 PR PBB SHADOW E&M-EST. PATIENT-LVL III: ICD-10-PCS | Mod: PBBFAC,HCNC,, | Performed by: INTERNAL MEDICINE

## 2020-02-17 PROCEDURE — 99999 PR PBB SHADOW E&M-EST. PATIENT-LVL III: CPT | Mod: PBBFAC,HCNC,, | Performed by: INTERNAL MEDICINE

## 2020-02-17 RX ADMIN — DARBEPOETIN ALFA 100 MCG: 100 INJECTION, SOLUTION INTRAVENOUS; SUBCUTANEOUS at 09:02

## 2020-02-17 NOTE — ASSESSMENT & PLAN NOTE
Treated with vindaquel - improvement in functional status  Pro- BNP/ BNP and trop / six minute in March 2020

## 2020-02-17 NOTE — PROGRESS NOTES
Subjective: tafamidis since Aug 2019     Patient ID:  Ewelina Arnold is a 74 y.o. female who presents for follow-up of Congestive Heart Failure      Congestive Heart Failure   Pertinent negatives include no chest pain, coughing or myalgias.     73-year-old female aortic stenosis, CHFpEF, HBP and is undergoing treatment for cardiac amyloidosis with tafamidis since Aug 2019.  Reports no side effects from tafamidis     Since starting tafamidis she has not been exercising.  She recently get a consult for physcial therapy. No real change in class 3 symptoms. No edema with no change in clinic weight. She sleeps on 2 pillows no definite PND spells. She is happy to see her renal function stablize since starting tafamidis  ( vindaquel)     07/25/2019 six minute walk  Distance: 206.35 meters (677 feet) Since the previous study in July 2019, exercise capacity may be   somewhat improved.    Echo Sept 2019   · Concentric left ventricular hypertrophy.  · Normal left ventricular systolic function. The estimated ejection fraction is 65%  · Normal right ventricular systolic function.  · Grade II (moderate) left ventricular diastolic dysfunction consistent with pseudonormalization.  · Severe left atrial enlargement.  · Mild right atrial enlargement.  · Moderate-to-severe aortic valve stenosis. Aortic valve area is 0.93 cm2; peak velocity is 4.1 m/s; mean gradient is 37 mmHg.  · Mild mitral regurgitation.  · Mild tricuspid regurgitation.  · Small pericardial effusion.  · There is a small right pleural effusion.  · The estimated PA systolic pressure is 48 mm Hg  · Intermediate central venous pressure (8 mm Hg).    Review of Systems   Constitution: Negative for decreased appetite, weight gain and weight loss.   Cardiovascular: Negative for chest pain, dyspnea on exertion, leg swelling, near-syncope, orthopnea and palpitations.   Respiratory: Negative for cough and shortness of breath.    Musculoskeletal: Negative for myalgias.  "  Gastrointestinal: Negative for jaundice.        Objective:    Physical Exam   Constitutional: She appears well-developed and well-nourished. No distress.   BP (!) 158/70 (BP Location: Left arm, Patient Position: Sitting, BP Method: Small (Automatic))   Pulse (!) 58   Ht 5' 4" (1.626 m)   Wt 52.4 kg (115 lb 8.3 oz)   LMP  (LMP Unknown) Comment: 99  BMI 19.83 kg/m²        HENT:   Head: Normocephalic and atraumatic. Head is without abrasion and without contusion.   Right Ear: External ear normal.   Left Ear: External ear normal.   Nose: Nose normal. No epistaxis.   Mouth/Throat: Oropharynx is clear and moist. Mucous membranes are not cyanotic.   Eyes: Pupils are equal, round, and reactive to light. Conjunctivae and EOM are normal.   Neck: Normal range of motion. Neck supple. No tracheal deviation present.   Cardiovascular: Normal rate, regular rhythm and normal pulses. Exam reveals no gallop.   Murmur heard.   Harsh midsystolic murmur is present with a grade of 3/6 at the upper right sternal border radiating to the neck.  Pulmonary/Chest: Effort normal and breath sounds normal. No stridor. No respiratory distress. She has no wheezes.   Abdominal: Soft. Normal appearance, normal aorta and bowel sounds are normal. She exhibits no distension. There is no tenderness.   Musculoskeletal: She exhibits no edema or tenderness.   Neurological: She is alert. She has normal strength and normal reflexes. She exhibits normal muscle tone.   Skin: Skin is warm. No rash noted. No erythema.   Psychiatric: She has a normal mood and affect. Her speech is normal and behavior is normal. Judgment and thought content normal. Cognition and memory are normal.     Lab Results   Component Value Date    BNP 3,557 (H) 02/12/2020     02/12/2020     02/12/2020    K 4.6 02/12/2020    K 4.6 02/12/2020    MG 1.7 02/12/2020     02/12/2020     02/12/2020    CO2 24 02/12/2020    CO2 24 02/12/2020    BUN 73 (H) 02/12/2020    " BUN 73 (H) 02/12/2020    CREATININE 3.4 (H) 02/12/2020    CREATININE 3.4 (H) 02/12/2020    GLU 86 02/12/2020    GLU 86 02/12/2020    HGBA1C 5.2 09/25/2018    AST 32 02/12/2020    AST 32 02/12/2020    ALT 9 (L) 02/12/2020    ALT 9 (L) 02/12/2020    ALBUMIN 3.0 (L) 02/12/2020    ALBUMIN 3.0 (L) 02/12/2020    PROT 6.6 02/12/2020    PROT 6.6 02/12/2020    BILITOT 0.2 02/12/2020    BILITOT 0.2 02/12/2020    CHOL 181 08/28/2018    HDL 66 08/28/2018    LDLCALC 104.2 08/28/2018    TRIG 54 08/28/2018           BNP   Date Value Ref Range Status   02/12/2020 3,557 (H) 0 - 99 pg/mL Final     Comment:     Values of less than 100 pg/ml are consistent with non-CHF populations.   12/17/2019 2,025 (H) 0 - 99 pg/mL Final     Comment:     Values of less than 100 pg/ml are consistent with non-CHF populations.   09/18/2019 2,296 (H) 0 - 99 pg/mL Final     Comment:     Values of less than 100 pg/ml are consistent with non-CHF populations.               Assessment:       1. Wild-type transthyretin-related (ATTR) amyloidosis    2. Cardiorenal syndrome, stage 1-4 or unspecified chronic kidney disease, with heart failure    3. Aortic stenosis, moderate         Plan:       Problem List Items Addressed This Visit     Aortic stenosis, moderate    Current Assessment & Plan     Stable with mean gradient around 40 PABLITO around 0.9 based on Sept 2019 echo   Based on April 2019 interventional visit -   This patient is currently cohort C because of pericardial effusion, Cr 4.4/BUN 90.  After the initiation of hemodialysis we will be able to evaluate her for TAVR.   My recommendation is that we wait til she need dialysis before we consider TAVR unless it is clear that AS is causing her symptoms          Cardiorenal syndrome, stage 1-4 or unspecified chronic kidney disease, with heart failure    Overview     Patient with worsening kidney function in the setting of decompensated CHF and aortic stenosis         Current Assessment & Plan     · Seems to be  stablizing of late         Wild-type transthyretin-related (ATTR) amyloidosis    Overview     Treated with vindaquel with Cardiology, improvement in functional status         Current Assessment & Plan     Treated with vindaquel - improvement in functional status  Pro- BNP/ BNP and trop / six minute in March 2020          Relevant Orders    Brain natriuretic peptide    Stress test, pulmonary    Troponin I    NT-Pro Natriuretic Peptide    Echo Color Flow Doppler? Yes    NT-Pro Natriuretic Peptide    Troponin I    Brain natriuretic peptide    Stress test, pulmonary        No follow-ups on file.  Orders Placed This Encounter   Procedures    Brain natriuretic peptide    Troponin I    NT-Pro Natriuretic Peptide    NT-Pro Natriuretic Peptide    Troponin I    Brain natriuretic peptide    Echo Color Flow Doppler? Yes    Stress test, pulmonary    Stress test, pulmonary

## 2020-02-17 NOTE — PROGRESS NOTES
Hgb 10;Hct 33    No dose change  PER PROTOCOL FORM DROD-188. Aranesp 100 mcg given and 4 week return appt set.     Gfr 14.6/stage 5

## 2020-02-17 NOTE — ASSESSMENT & PLAN NOTE
Stable with mean gradient around 40 PABLITO around 0.9 based on Sept 2019 echo   Based on April 2019 interventional visit -   This patient is currently cohort C because of pericardial effusion, Cr 4.4/BUN 90.  After the initiation of hemodialysis we will be able to evaluate her for TAVR.   My recommendation is that we wait til she need dialysis before we consider TAVR unless it is clear that AS is causing her symptoms

## 2020-02-20 ENCOUNTER — TELEPHONE (OUTPATIENT)
Dept: HOME HEALTH SERVICES | Facility: HOSPITAL | Age: 75
End: 2020-02-20

## 2020-02-20 DIAGNOSIS — I50.31 ACUTE HEART FAILURE WITH NORMAL EJECTION FRACTION: ICD-10-CM

## 2020-02-20 DIAGNOSIS — I25.10 CORONARY ARTERY DISEASE DUE TO CALCIFIED CORONARY LESION: ICD-10-CM

## 2020-02-20 DIAGNOSIS — I25.84 CORONARY ARTERY DISEASE DUE TO CALCIFIED CORONARY LESION: ICD-10-CM

## 2020-02-21 RX ORDER — SODIUM BICARBONATE 650 MG/1
650 TABLET ORAL 2 TIMES DAILY
Qty: 180 TABLET | Refills: 3 | Status: SHIPPED | OUTPATIENT
Start: 2020-02-21

## 2020-02-21 RX ORDER — SODIUM BICARBONATE 650 MG/1
650 TABLET ORAL 2 TIMES DAILY
Qty: 180 TABLET | Refills: 3 | Status: SHIPPED | OUTPATIENT
Start: 2020-02-21 | End: 2021-02-20

## 2020-02-21 NOTE — TELEPHONE ENCOUNTER
They will send to pt it wasn't going through because of she already has 3 refills on it already. done

## 2020-02-21 NOTE — TELEPHONE ENCOUNTER
----- Message from Bere Parikh sent at 2/21/2020  2:49 PM CST -----  Contact: valerio lemus/ lona    Requesting an RX refill or new RX.  Is this a refill or new RX:  new  RX name and strength: sodium bicarbonate 650 MG tablet  Directions (copy/paste from chart):  Sig: TAKE 1 TABLET TWICE DAILY  Is this a 30 day or 90 day RX:  90  Local pharmacy or mail order pharmacy:  Mail order  Pharmacy name and phone # (copy/paste from chart):   [facility] - [facility] [address] [phone] (Phone)  [phone] (Fax)  Comments:  Please call pt w/ any questions

## 2020-02-21 NOTE — TELEPHONE ENCOUNTER
I attempted to sent Bicarb to [facility], but the script keeps printing. Please call [facility] and give verbal order for the script.    Thanks,  KJ

## 2020-03-02 ENCOUNTER — EXTERNAL HOME HEALTH (OUTPATIENT)
Dept: HOME HEALTH SERVICES | Facility: HOSPITAL | Age: 75
End: 2020-03-02
Payer: MEDICARE

## 2020-03-02 RX ORDER — FAMOTIDINE 20 MG/1
20 TABLET, FILM COATED ORAL DAILY
Qty: 90 TABLET | Refills: 0 | Status: SHIPPED | OUTPATIENT
Start: 2020-03-02

## 2020-03-03 ENCOUNTER — OFFICE VISIT (OUTPATIENT)
Dept: OPTOMETRY | Facility: CLINIC | Age: 75
End: 2020-03-03
Payer: COMMERCIAL

## 2020-03-03 DIAGNOSIS — H52.03 HYPEROPIA WITH PRESBYOPIA OF BOTH EYES: Primary | ICD-10-CM

## 2020-03-03 DIAGNOSIS — H52.4 HYPEROPIA WITH PRESBYOPIA OF BOTH EYES: Primary | ICD-10-CM

## 2020-03-03 PROCEDURE — 99999 PR PBB SHADOW E&M-EST. PATIENT-LVL II: CPT | Mod: PBBFAC,,, | Performed by: OPTOMETRIST

## 2020-03-03 PROCEDURE — 92015 DETERMINE REFRACTIVE STATE: CPT | Mod: S$GLB,,, | Performed by: OPTOMETRIST

## 2020-03-03 PROCEDURE — 99999 PR PBB SHADOW E&M-EST. PATIENT-LVL II: ICD-10-PCS | Mod: PBBFAC,,, | Performed by: OPTOMETRIST

## 2020-03-03 PROCEDURE — 92015 PR REFRACTION: ICD-10-PCS | Mod: S$GLB,,, | Performed by: OPTOMETRIST

## 2020-03-03 NOTE — PROGRESS NOTES
Adherence packed for 28 days using Dispill:     Morning card:   Aspirin 81 mg   Azathioprine 50 mg   Carvedilol 25 mg   Citalopram 20 mg (1&1/2 tabs)   Famotidine 20 mg   Levothyroxine 75 mcg   Nifedipine ER Osmotic 60 mg   Sodium Bicarb 650 mg   Vitamin D 30840 IU (Once weekly)     Evening card:   Atorvastatin 80 mg   Azathioprine 50 mg   Carvedilol 25 mg   Nifedipine ER Osmotic 60 mg   Sodium Bicarb 650 mg

## 2020-03-03 NOTE — PROGRESS NOTES
HPI     Last eye exam was 10/21/19 with   Pt here for MR check per     latanoprost QHS OU  Pt states she is just here for glasses.     Hemoglobin A1C       Date                     Value               Ref Range             Status                09/25/2018               5.2                 4.0 - 5.6 %           Final                     Last edited by Yanelis Forde on 3/3/2020 10:20 AM. (History)            Assessment /Plan     For exam results, see Encounter Report.    Hyperopia with presbyopia of both eyes          1.  Bifocal rx given.  RTC as scheduled with Dr. Sierra.

## 2020-03-05 ENCOUNTER — TELEPHONE (OUTPATIENT)
Dept: PRIMARY CARE CLINIC | Facility: CLINIC | Age: 75
End: 2020-03-05

## 2020-03-05 NOTE — TELEPHONE ENCOUNTER
----- Message from Whitley Varma sent at 3/5/2020  3:11 PM CST -----  Contact: Please [phone]   Patient has a bump on her leg and would like to get advice.  Bumps are red and tender with no warmth. Request call back.

## 2020-03-05 NOTE — TELEPHONE ENCOUNTER
Pt states she have 2 bumps one behind a knee and one on the front leg she states the bumps are kind of red not to red she noticed it 4 days ago can you send her something in or can she come in you are pretty filled next week her appt is 2 weeks from now

## 2020-03-06 ENCOUNTER — OFFICE VISIT (OUTPATIENT)
Dept: PRIMARY CARE CLINIC | Facility: CLINIC | Age: 75
End: 2020-03-06
Payer: MEDICARE

## 2020-03-06 VITALS
WEIGHT: 115 LBS | BODY MASS INDEX: 19.63 KG/M2 | SYSTOLIC BLOOD PRESSURE: 140 MMHG | HEIGHT: 64 IN | DIASTOLIC BLOOD PRESSURE: 64 MMHG | HEART RATE: 61 BPM | OXYGEN SATURATION: 93 %

## 2020-03-06 DIAGNOSIS — M32.14 OTHER SYSTEMIC LUPUS ERYTHEMATOSUS WITH GLOMERULAR DISEASE: Chronic | ICD-10-CM

## 2020-03-06 PROCEDURE — 99215 OFFICE O/P EST HI 40 MIN: CPT | Mod: HCNC,S$GLB,, | Performed by: INTERNAL MEDICINE

## 2020-03-06 PROCEDURE — 1126F PR PAIN SEVERITY QUANTIFIED, NO PAIN PRESENT: ICD-10-PCS | Mod: HCNC,S$GLB,, | Performed by: INTERNAL MEDICINE

## 2020-03-06 PROCEDURE — 99499 UNLISTED E&M SERVICE: CPT | Mod: S$GLB,,, | Performed by: INTERNAL MEDICINE

## 2020-03-06 PROCEDURE — 1159F MED LIST DOCD IN RCRD: CPT | Mod: HCNC,S$GLB,, | Performed by: INTERNAL MEDICINE

## 2020-03-06 PROCEDURE — 1101F PR PT FALLS ASSESS DOC 0-1 FALLS W/OUT INJ PAST YR: ICD-10-PCS | Mod: HCNC,CPTII,S$GLB, | Performed by: INTERNAL MEDICINE

## 2020-03-06 PROCEDURE — 1101F PT FALLS ASSESS-DOCD LE1/YR: CPT | Mod: HCNC,CPTII,S$GLB, | Performed by: INTERNAL MEDICINE

## 2020-03-06 PROCEDURE — 1159F PR MEDICATION LIST DOCUMENTED IN MEDICAL RECORD: ICD-10-PCS | Mod: HCNC,S$GLB,, | Performed by: INTERNAL MEDICINE

## 2020-03-06 PROCEDURE — 1126F AMNT PAIN NOTED NONE PRSNT: CPT | Mod: HCNC,S$GLB,, | Performed by: INTERNAL MEDICINE

## 2020-03-06 PROCEDURE — 99215 PR OFFICE/OUTPT VISIT, EST, LEVL V, 40-54 MIN: ICD-10-PCS | Mod: HCNC,S$GLB,, | Performed by: INTERNAL MEDICINE

## 2020-03-06 PROCEDURE — 99499 RISK ADDL DX/OHS AUDIT: ICD-10-PCS | Mod: S$GLB,,, | Performed by: INTERNAL MEDICINE

## 2020-03-06 NOTE — PATIENT INSTRUCTIONS
TODAY:  - apply steroid cream to lupus rash in all locations   + try to send PCP pictures of your rash in the future  - pack a lunch for your next visit day on 3/16/20

## 2020-03-06 NOTE — PROGRESS NOTES
"Primary Care Provider Appointment   SHARED NOTE: Nick Sanchez (MS4), Dr Johns (Attending)    Subjective:      Patient ID: Ewelina Arnold is a 74 y.o. female with amyloidosis, HTN, MDD, pHTN, CKD.     Chief Complaint: Mass (bumps on leg ) and Shortness of Breath    Pt presents with a red, well circumscribed, tender mass on L anterior leg. Pt states that it has been present for the last week. Pt has not tried anything to relieve symptoms. Pt says that it looks different than the rash that was present on both of her arms. Pt denies fever, malaise, joint pain, or flair of lupus recently. On comparison to previous pictures of lupus rash, the appearance is similar. She is not using her steroid cream at this time.    Pt says that her shortness of breath has been a long standing issue. Pt says that she only feels it when there is "too much fluid". Pt says that her shortness of breath and swelling improve with her lasix.    She has HH PT, and is increasing her functional status.    Pt says that her mood has only been "down" for the last day due to the sore on her leg. Pt believed that this was just too much on her plate, but feels reassured that it is probably from the lupus and not an infection.    Pt says that she is compliant with her medications. Currently pill pack includes:  Morning card:    Aspirin 81 mg   Azathioprine 50 mg   Carvedilol 25 mg   Citalopram 20 mg (1&1/2 tabs)   Famotidine 20 mg   Levothyroxine 75 mcg   Nifedipine ER Osmotic 60 mg   Sodium Bicarb 650 mg   Vitamin D 49517 IU (Once weekly)      Evening card:   Atorvastatin 80 mg   Azathioprine 50 mg   Carvedilol 25 mg   Nifedipine ER Osmotic 60 mg   Sodium Bicarb 650 mg         Past Surgical History:   Procedure Laterality Date    CARDIAC CATHETERIZATION  07/27/2011    x1    CHOLECYSTECTOMY  1999    COLON SURGERY  2000 & 2003    partial removal for CA    COLONOSCOPY N/A 4/14/2016    Procedure: COLONOSCOPY;  Surgeon: Jose Steen MD;  Location: " [facility] ENDO (4TH FLR);  Service: Endoscopy;  Laterality: N/A;  prep 2 days prior light meals only/clear liquid day before  and 4 dulcolax tabs (5 mgs) at noon    COLONOSCOPY N/A 9/14/2017    Procedure: COLONOSCOPY;  Surgeon: Jose Steen MD;  Location: [facility] ENDO (4TH FLR);  Service: Endoscopy;  Laterality: N/A;    EYE SURGERY      HAND SURGERY Bilateral 1996 & 1997    due to carpal tunnel     HERNIA REPAIR      HYSTERECTOMY  1983    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    OOPHORECTOMY      removed one    Peripheral Iridotomy both eyes  1994    laser angle correction    THYROIDECTOMY, PARTIAL  2006    to remove two nodules and one-half thyroid       Past Medical History:   Diagnosis Date    Acute hypoxemic respiratory failure 4/28/2018    Acute on chronic diastolic congestive heart failure 11/17/2017    Allergy     Anatomical narrow angle glaucoma     Anemia     States diagnosed about a month ago    Aortic atherosclerosis     Arthritis     Atherosclerosis of native coronary artery with angina pectoris 1/3/2020    Cataract     CHF (congestive heart failure)     Chronic kidney disease     Chronic sciatica of left side     Right lower back pain due to sciatica    Coronary artery disease     Depression     Dry eyes     Gastroenteritis, acute     GERD (gastroesophageal reflux disease)     History of colon cancer     Hyperaldosteronism     Hyperlipidemia     Hypertension     Hypothyroidism     Keloid cicatrix     Left ventricular diastolic dysfunction with preserved systolic function     Lupus (systemic lupus erythematosus) 8/17/2012    Lupus (systemic lupus erythematosus)     Malnutrition 5/16/2017    Osteoarthritis of cervical spine 8/17/2012    Osteopenia     PAD (peripheral artery disease)     FRAN (renal artery stenosis)        Social History     Socioeconomic History    Marital status: Single     Spouse name: Not on file    Number of children: Not on file    Years of  education: Not on file    Highest education level: Not on file   Occupational History     Employer: Retired    Social Needs    Financial resource strain: Not very hard    Food insecurity:     Worry: Never true     Inability: Never true    Transportation needs:     Medical: Yes     Non-medical: Yes   Tobacco Use    Smoking status: Former Smoker     Packs/day: 1.50     Years: 30.00     Pack years: 45.00     Types: Cigarettes     Start date:      Last attempt to quit: 3/13/1985     Years since quittin.0    Smokeless tobacco: Never Used   Substance and Sexual Activity    Alcohol use: No    Drug use: No    Sexual activity: Not Currently     Partners: Male     Birth control/protection: Abstinence   Lifestyle    Physical activity:     Days per week: Not on file     Minutes per session: Not on file    Stress: Not on file   Relationships    Social connections:     Talks on phone: Not on file     Gets together: Not on file     Attends Confucianism service: Not on file     Active member of club or organization: Not on file     Attends meetings of clubs or organizations: Not on file     Relationship status: Not on file   Other Topics Concern    Are you pregnant or think you may be? No    Breast-feeding No   Social History Narrative    Not on file       Review of Systems   Constitutional: Negative for appetite change, chills and fever.   HENT: Negative for congestion, rhinorrhea, sinus pain and sore throat.    Eyes: Negative for photophobia, redness and visual disturbance.   Respiratory: Negative for cough and shortness of breath.    Cardiovascular: Negative for chest pain, palpitations and leg swelling.   Gastrointestinal: Negative for abdominal pain, blood in stool, constipation, diarrhea and vomiting.   Genitourinary: Negative for dysuria, frequency, hematuria and urgency.   Skin: Positive for rash.   Neurological: Negative for dizziness, syncope, light-headedness and headaches.   Psychiatric/Behavioral:  "Positive for dysphoric mood and sleep disturbance (feeling excessively sleepy). Negative for confusion and suicidal ideas. The patient is not nervous/anxious.        Objective:   BP (!) 140/64   Pulse 61   Ht 5' 4" (1.626 m)   Wt 52.2 kg (114 lb 15.5 oz)   LMP  (LMP Unknown) Comment: 99  SpO2 (!) 93%   BMI 19.73 kg/m²     Physical Exam   Constitutional: She is oriented to person, place, and time.   Thin, yawning a lot during conversation, appears to be low of energy   HENT:   Head: Normocephalic and atraumatic.   Neck: Normal range of motion.   Cardiovascular: Normal rate and regular rhythm.   Murmur (holosystolic) heard.  Pulmonary/Chest: Effort normal and breath sounds normal.   Abdominal: Soft. Bowel sounds are normal. There is no tenderness.   Musculoskeletal: She exhibits deformity. She exhibits no edema.   Red patch on LLE.   Neurological: She is alert and oriented to person, place, and time.   Skin: Skin is warm and dry.   Psychiatric: Her behavior is normal. Thought content normal.   Reduced affect   Vitals reviewed.                  Lab Results   Component Value Date    WBC 2.82 (L) 02/12/2020    HGB 10.0 (L) 02/12/2020    HGB 10.0 (L) 02/12/2020    HCT 33.0 (L) 02/12/2020    HCT 33.0 (L) 02/12/2020     02/12/2020    CHOL 181 08/28/2018    TRIG 54 08/28/2018    HDL 66 08/28/2018    ALT 9 (L) 02/12/2020    ALT 9 (L) 02/12/2020    AST 32 02/12/2020    AST 32 02/12/2020     02/12/2020     02/12/2020    K 4.6 02/12/2020    K 4.6 02/12/2020     02/12/2020     02/12/2020    CREATININE 3.4 (H) 02/12/2020    CREATININE 3.4 (H) 02/12/2020    BUN 73 (H) 02/12/2020    BUN 73 (H) 02/12/2020    CO2 24 02/12/2020    CO2 24 02/12/2020    TSH 1.798 01/25/2019    INR 1.0 09/05/2018    GLUF 79 12/02/2011    HGBA1C 5.2 09/25/2018       Current Outpatient Medications on File Prior to Visit   Medication Sig Dispense Refill    atorvastatin (LIPITOR) 80 MG tablet TAKE 1 TABLET EVERY DAY 90 " tablet 1    augmented betamethasone dipropionate (DIPROLENE-AF) 0.05 % cream Apply topically 2 (two) times daily. 50 g 3    azaTHIOprine (IMURAN) 50 mg Tab Take 1 tablet (50 mg total) by mouth 2 (two) times daily. 60 tablet 2    betamethasone dipropionate (DIPROLENE) 0.05 % ointment Apply topically 2 (two) times daily to lupus rashes as needed 45 g 3    butalbital-aspirin-caffeine -40 mg (FIORINAL) -40 mg Cap Take 1 capsule by mouth as needed.       carvedilol (COREG) 25 MG tablet Take 1 tablet (25 mg total) by mouth 2 (two) times daily with meals. 180 tablet 3    cholestyramine-aspartame (QUESTRAN LIGHT) 4 gram PwPk Take 4 g by mouth as needed.      citalopram (CELEXA) 20 MG tablet TAKE 1 AND 1/2 TABLETS ONE TIME DAILY 135 tablet 0    clobetasol (TEMOVATE) 0.05 % cream Apply topically 2 (two) times daily. To lupus rashes x 2 weeks, then weekends only 60 g 1    clobetasol (TEMOVATE) 0.05 % external solution Apply topically 2 (two) times daily. (Patient taking differently: Apply topically 2 (two) times daily as needed. ) 50 mL 2    doxazosin (CARDURA) 2 MG tablet Take 1 tablet (2 mg total) by mouth nightly as needed (SBP > 150). 90 tablet 3    ergocalciferol (VITAMIN D2) 50,000 unit Cap Take 1 capsule (50,000 Units total) by mouth every 7 days. 12 capsule 3    estrogens, conjugated, (PREMARIN) 0.45 MG tablet Take 42.5 mg by mouth.      famotidine (PEPCID) 20 MG tablet TAKE 1 TABLET (20 MG TOTAL) BY MOUTH ONCE DAILY. 90 tablet 0    fluocinonide (LIDEX) 0.05 % ointment Apply topically 2 (two) times daily. To lupus rashes PRN 60 g 2    furosemide (LASIX) 40 MG tablet Take 1 tablet (40 mg total) by mouth daily as needed (swelling and shortness of breath). TAKE 1 TABLET DAILY AS NEEDED FOR WEIGHT GAIN, SHORTNESS OF BREATH 90 tablet 3    L.ACIDOPHILUS-BIFIDO.LONGUM ORAL Take by mouth.      latanoprost 0.005 % ophthalmic solution INSTILL 1 DROP INTO BOTH EYES EVERY DAY 3 Bottle 12     levothyroxine (SYNTHROID) 75 MCG tablet TAKE 1 TABLET (75 MCG TOTAL) BY MOUTH BEFORE BREAKFAST 90 tablet 0    meclizine (ANTIVERT) 25 mg tablet Take 1 tablet (25 mg total) by mouth 3 (three) times daily as needed for Dizziness or Nausea. 90 tablet 0    NIFEdipine (PROCARDIA-XL) 60 MG (OSM) 24 hr tablet Take 1 tablet (60 mg total) by mouth 2 (two) times daily. 180 tablet 3    nitroGLYCERIN (NITROSTAT) 0.4 MG SL tablet Place 1 tablet (0.4 mg total) under the tongue every 5 (five) minutes as needed for Chest pain. 25 tablet 3    nystatin (MYCOSTATIN) powder Apply topically as needed.       sodium bicarbonate 650 MG tablet Take 1 tablet (650 mg total) by mouth 2 (two) times daily. 180 tablet 3    sodium bicarbonate 650 MG tablet Take 1 tablet (650 mg total) by mouth 2 (two) times daily. 180 tablet 3    TENS units Sravanthi by Misc.(Non-Drug; Combo Route) route.      triamcinolone acetonide 0.5% (KENALOG) 0.5 % Crea Apply 0.5 application topically 2 (two) times daily.      aspirin (ECOTRIN) 81 MG EC tablet Take 1 tablet (81 mg total) by mouth once daily. 30 tablet 0    calcipotriene 0.005 % sclp soln Apply 1 application topically 2 (two) times daily. (Patient taking differently: Apply 1 application topically 2 (two) times daily as needed. ) 60 mL 3    metOLazone (ZAROXOLYN) 2.5 MG tablet Take 2 tablets (5 mg total) by mouth once daily. (Patient taking differently: Take 5 mg by mouth as needed. ) 180 tablet 3     No current facility-administered medications on file prior to visit.          Assessment:   74 y.o. female with multiple co-morbid illnesses here to follow-up with PCP and continue work-up of chronic issues notably amyloidosis, HTN, MDD, pHTN, CKD.    Plan:     Problem List Items Addressed This Visit        Immunology/Multi System    Other forms of systemic lupus erythematosus (Chronic)     Diagnosed in 1990s, pos CARYN, SSB. Biopsy showed cutaneous involvment  · Did not tolerate plaquenil  · Completed  "prednisone for erythema nodosum  · May need another course PRN per Rheum  · Continue Imuran  · F/U Rheum  · PCP previously messaged Dr Ramirez about new rash (pics in note), but reassurance provided as these are "intermittent lesions that she should treat with topical steroids"               Health Maintenance       Date Due Completion Date    Shingles Vaccine (2 of 3) 01/09/2012 11/14/2011    Mammogram 09/18/2020 9/18/2019    DEXA SCAN 01/08/2022 1/8/2019    Colonoscopy 09/14/2022 9/14/2017    Lipid Panel 08/28/2023 8/28/2018    TETANUS VACCINE 06/29/2026 6/29/2016          Follow up already has follow-up on 3/16. Total face-to-face time was 60 min, >50% of this was spent on counseling and coordination of care. The following issues were discussed: amyloidosis, HTN, MDD, pHTN, CKD.    Nick Sanchez (student)    Amarilys Johns MD/MPH  Internal Medicine  [facility]  [phone] spectralink             "

## 2020-03-08 ENCOUNTER — PATIENT MESSAGE (OUTPATIENT)
Dept: PRIMARY CARE CLINIC | Facility: CLINIC | Age: 75
End: 2020-03-08

## 2020-03-09 ENCOUNTER — PATIENT MESSAGE (OUTPATIENT)
Dept: PRIMARY CARE CLINIC | Facility: CLINIC | Age: 75
End: 2020-03-09

## 2020-03-10 ENCOUNTER — PATIENT MESSAGE (OUTPATIENT)
Dept: PRIMARY CARE CLINIC | Facility: CLINIC | Age: 75
End: 2020-03-10

## 2020-03-11 ENCOUNTER — LAB VISIT (OUTPATIENT)
Dept: LAB | Facility: HOSPITAL | Age: 75
End: 2020-03-11
Attending: INTERNAL MEDICINE
Payer: MEDICARE

## 2020-03-11 ENCOUNTER — OFFICE VISIT (OUTPATIENT)
Dept: PRIMARY CARE CLINIC | Facility: CLINIC | Age: 75
End: 2020-03-11
Payer: MEDICARE

## 2020-03-11 VITALS
OXYGEN SATURATION: 96 % | HEIGHT: 64 IN | HEART RATE: 64 BPM | SYSTOLIC BLOOD PRESSURE: 128 MMHG | BODY MASS INDEX: 18.99 KG/M2 | DIASTOLIC BLOOD PRESSURE: 60 MMHG | WEIGHT: 111.25 LBS

## 2020-03-11 DIAGNOSIS — M79.89 LEG SWELLING: ICD-10-CM

## 2020-03-11 DIAGNOSIS — M32.9 RASH DUE TO SYSTEMIC LUPUS ERYTHEMATOSUS (SLE): ICD-10-CM

## 2020-03-11 DIAGNOSIS — M32.19 OTHER SYSTEMIC LUPUS ERYTHEMATOSUS WITH OTHER ORGAN INVOLVEMENT: ICD-10-CM

## 2020-03-11 DIAGNOSIS — M32.14 OTHER SYSTEMIC LUPUS ERYTHEMATOSUS WITH GLOMERULAR DISEASE: Primary | ICD-10-CM

## 2020-03-11 DIAGNOSIS — M32.14 OTHER SYSTEMIC LUPUS ERYTHEMATOSUS WITH GLOMERULAR DISEASE: ICD-10-CM

## 2020-03-11 DIAGNOSIS — R60.9 EDEMA, UNSPECIFIED TYPE: ICD-10-CM

## 2020-03-11 DIAGNOSIS — Z66 DNR (DO NOT RESUSCITATE): ICD-10-CM

## 2020-03-11 DIAGNOSIS — R54 FRAILTY SYNDROME IN GERIATRIC PATIENT: ICD-10-CM

## 2020-03-11 LAB
ALBUMIN SERPL BCP-MCNC: 2.8 G/DL (ref 3.5–5.2)
ALP SERPL-CCNC: 74 U/L (ref 55–135)
ALT SERPL W/O P-5'-P-CCNC: 11 U/L (ref 10–44)
ANION GAP SERPL CALC-SCNC: 14 MMOL/L (ref 8–16)
AST SERPL-CCNC: 35 U/L (ref 10–40)
BASOPHILS # BLD AUTO: 0.03 K/UL (ref 0–0.2)
BASOPHILS NFR BLD: 1.1 % (ref 0–1.9)
BILIRUB SERPL-MCNC: 0.3 MG/DL (ref 0.1–1)
BUN SERPL-MCNC: 95 MG/DL (ref 8–23)
C3 SERPL-MCNC: 113 MG/DL (ref 50–180)
C4 SERPL-MCNC: 29 MG/DL (ref 11–44)
CALCIUM SERPL-MCNC: 9 MG/DL (ref 8.7–10.5)
CHLORIDE SERPL-SCNC: 101 MMOL/L (ref 95–110)
CO2 SERPL-SCNC: 26 MMOL/L (ref 23–29)
CREAT SERPL-MCNC: 3.9 MG/DL (ref 0.5–1.4)
CRP SERPL-MCNC: 0.4 MG/L (ref 0–8.2)
DIFFERENTIAL METHOD: ABNORMAL
EOSINOPHIL # BLD AUTO: 0.2 K/UL (ref 0–0.5)
EOSINOPHIL NFR BLD: 5.8 % (ref 0–8)
ERYTHROCYTE [DISTWIDTH] IN BLOOD BY AUTOMATED COUNT: 16.3 % (ref 11.5–14.5)
ERYTHROCYTE [SEDIMENTATION RATE] IN BLOOD BY WESTERGREN METHOD: 35 MM/HR (ref 0–36)
EST. GFR  (AFRICAN AMERICAN): 12.4 ML/MIN/1.73 M^2
EST. GFR  (NON AFRICAN AMERICAN): 10.7 ML/MIN/1.73 M^2
GLUCOSE SERPL-MCNC: 83 MG/DL (ref 70–110)
HCT VFR BLD AUTO: 34.6 % (ref 37–48.5)
HGB BLD-MCNC: 10.2 G/DL (ref 12–16)
IMM GRANULOCYTES # BLD AUTO: 0.02 K/UL (ref 0–0.04)
IMM GRANULOCYTES NFR BLD AUTO: 0.7 % (ref 0–0.5)
LYMPHOCYTES # BLD AUTO: 0.3 K/UL (ref 1–4.8)
LYMPHOCYTES NFR BLD: 10.8 % (ref 18–48)
MCH RBC QN AUTO: 26.8 PG (ref 27–31)
MCHC RBC AUTO-ENTMCNC: 29.5 G/DL (ref 32–36)
MCV RBC AUTO: 91 FL (ref 82–98)
MONOCYTES # BLD AUTO: 0.5 K/UL (ref 0.3–1)
MONOCYTES NFR BLD: 17.3 % (ref 4–15)
NEUTROPHILS # BLD AUTO: 1.8 K/UL (ref 1.8–7.7)
NEUTROPHILS NFR BLD: 64.3 % (ref 38–73)
NRBC BLD-RTO: 0 /100 WBC
PLATELET # BLD AUTO: 291 K/UL (ref 150–350)
PMV BLD AUTO: 11.3 FL (ref 9.2–12.9)
POTASSIUM SERPL-SCNC: 4.7 MMOL/L (ref 3.5–5.1)
PROT SERPL-MCNC: 6.9 G/DL (ref 6–8.4)
RBC # BLD AUTO: 3.8 M/UL (ref 4–5.4)
SODIUM SERPL-SCNC: 141 MMOL/L (ref 136–145)
WBC # BLD AUTO: 2.77 K/UL (ref 3.9–12.7)

## 2020-03-11 PROCEDURE — 3288F FALL RISK ASSESSMENT DOCD: CPT | Mod: HCNC,CPTII,S$GLB, | Performed by: INTERNAL MEDICINE

## 2020-03-11 PROCEDURE — 1100F PTFALLS ASSESS-DOCD GE2>/YR: CPT | Mod: HCNC,CPTII,S$GLB, | Performed by: INTERNAL MEDICINE

## 2020-03-11 PROCEDURE — 80053 COMPREHEN METABOLIC PANEL: CPT | Mod: HCNC

## 2020-03-11 PROCEDURE — 86140 C-REACTIVE PROTEIN: CPT | Mod: HCNC

## 2020-03-11 PROCEDURE — 99215 PR OFFICE/OUTPT VISIT, EST, LEVL V, 40-54 MIN: ICD-10-PCS | Mod: HCNC,S$GLB,, | Performed by: INTERNAL MEDICINE

## 2020-03-11 PROCEDURE — 1125F AMNT PAIN NOTED PAIN PRSNT: CPT | Mod: HCNC,S$GLB,, | Performed by: INTERNAL MEDICINE

## 2020-03-11 PROCEDURE — 99215 OFFICE O/P EST HI 40 MIN: CPT | Mod: HCNC,S$GLB,, | Performed by: INTERNAL MEDICINE

## 2020-03-11 PROCEDURE — 1159F MED LIST DOCD IN RCRD: CPT | Mod: HCNC,S$GLB,, | Performed by: INTERNAL MEDICINE

## 2020-03-11 PROCEDURE — 82043 UR ALBUMIN QUANTITATIVE: CPT | Mod: HCNC

## 2020-03-11 PROCEDURE — 85652 RBC SED RATE AUTOMATED: CPT | Mod: HCNC

## 2020-03-11 PROCEDURE — 85025 COMPLETE CBC W/AUTO DIFF WBC: CPT | Mod: HCNC

## 2020-03-11 PROCEDURE — 3288F PR FALLS RISK ASSESSMENT DOCUMENTED: ICD-10-PCS | Mod: HCNC,CPTII,S$GLB, | Performed by: INTERNAL MEDICINE

## 2020-03-11 PROCEDURE — 86160 COMPLEMENT ANTIGEN: CPT | Mod: 59,HCNC

## 2020-03-11 PROCEDURE — 1100F PR PT FALLS ASSESS DOC 2+ FALLS/FALL W/INJURY/YR: ICD-10-PCS | Mod: HCNC,CPTII,S$GLB, | Performed by: INTERNAL MEDICINE

## 2020-03-11 PROCEDURE — 86225 DNA ANTIBODY NATIVE: CPT | Mod: HCNC

## 2020-03-11 PROCEDURE — 86160 COMPLEMENT ANTIGEN: CPT | Mod: HCNC

## 2020-03-11 PROCEDURE — 99497 ADVNCD CARE PLAN 30 MIN: CPT | Mod: 25,HCNC,S$GLB, | Performed by: INTERNAL MEDICINE

## 2020-03-11 PROCEDURE — 36415 COLL VENOUS BLD VENIPUNCTURE: CPT | Mod: HCNC

## 2020-03-11 PROCEDURE — 99497 PR ADVNCD CARE PLAN 30 MIN: ICD-10-PCS | Mod: 25,HCNC,S$GLB, | Performed by: INTERNAL MEDICINE

## 2020-03-11 PROCEDURE — 1159F PR MEDICATION LIST DOCUMENTED IN MEDICAL RECORD: ICD-10-PCS | Mod: HCNC,S$GLB,, | Performed by: INTERNAL MEDICINE

## 2020-03-11 PROCEDURE — 1125F PR PAIN SEVERITY QUANTIFIED, PAIN PRESENT: ICD-10-PCS | Mod: HCNC,S$GLB,, | Performed by: INTERNAL MEDICINE

## 2020-03-11 NOTE — Clinical Note
Misha Ramirez,Ms Arnold is having a rash site enlargement (see pics in note). I can't determine whether its cellulitis versus lupus flair. It's been here for 2 weeks and is slowly expanding and swelling, so I suspect that its more likely lupus.I am getting labs today, and am considering US if I can get it done today. Do you need anything else?I am transitioning her to telemedicine encounters and am training her on it now. Given her frailty and chronic conditions, she is high risk for poor outcomes.Thanks,WENDY

## 2020-03-11 NOTE — ASSESSMENT & PLAN NOTE
Patient does not want aggressive measures at the end of life if there are no meaningful expectations of recovery.  · DNR status updated  · ADV Directives completed and uploaded to chart

## 2020-03-11 NOTE — ASSESSMENT & PLAN NOTE
New skin rash on her legs  · Rule out lupus flair versus cellulitis  · Labs today  · Will defer starting antibiotics until we are aware of cause  · Note sent to Rheum

## 2020-03-11 NOTE — PROGRESS NOTES
Primary Care Provider Appointment- URGENT APPOINTMENT    Subjective:      Patient ID: Ewelina Arnold is a 74 y.o. female with lupus, amyloid and leg rash    Chief Complaint: Follow-up and Mass    Patient's leg rash has expanded and now is painful (7/10) and with diffuse erythema. It's unclear whether she is having a lupus flair versus cellulitis versus DVT.     Patient is also high-risk for poor outcomes if exposed to COVID-19. She has no advanced directives on file. She has [facility] patient portal, but no telemedicine training. She is willing to complete her AD today, and receive telemed training.      TODAY 3/11/20      RASH ON 3/9/20    Pill packs as follows:  Adherence packed for 28 days using Dispill:      Morning card:   Aspirin 81 mg   Azathioprine 50 mg   Carvedilol 25 mg   Citalopram 20 mg (1&1/2 tabs)   Famotidine 20 mg   Levothyroxine 75 mcg   Nifedipine ER Osmotic 60 mg   Sodium Bicarb 650 mg   Vitamin D 09737 IU (Once weekly)      Evening card:   Atorvastatin 80 mg   Azathioprine 50 mg   Carvedilol 25 mg   Nifedipine ER Osmotic 60 mg   Sodium Bicarb 650 mg          Electronically signed by Tatyana Garsia, PharmD at 3/3/2020  9:08 AM       Past Surgical History:   Procedure Laterality Date    CARDIAC CATHETERIZATION  07/27/2011    x1    CHOLECYSTECTOMY  1999    COLON SURGERY  2000 & 2003    partial removal for CA    COLONOSCOPY N/A 4/14/2016    Procedure: COLONOSCOPY;  Surgeon: Jose Steen MD;  Location: [address]);  Service: Endoscopy;  Laterality: N/A;  prep 2 days prior light meals only/clear liquid day before  and 4 dulcolax tabs (5 mgs) at noon    COLONOSCOPY N/A 9/14/2017    Procedure: COLONOSCOPY;  Surgeon: Jose Steen MD;  Location: [address]);  Service: Endoscopy;  Laterality: N/A;    EYE SURGERY      HAND SURGERY Bilateral 1996 & 1997    due to carpal tunnel     HERNIA REPAIR      HYSTERECTOMY  1983    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    OOPHORECTOMY       removed one    Peripheral Iridotomy both eyes  1994    laser angle correction    THYROIDECTOMY, PARTIAL  2006    to remove two nodules and one-half thyroid       Past Medical History:   Diagnosis Date    Acute hypoxemic respiratory failure 4/28/2018    Acute on chronic diastolic congestive heart failure 11/17/2017    Allergy     Anatomical narrow angle glaucoma     Anemia     States diagnosed about a month ago    Aortic atherosclerosis     Arthritis     Atherosclerosis of native coronary artery with angina pectoris 1/3/2020    Cataract     CHF (congestive heart failure)     Chronic kidney disease     Chronic sciatica of left side     Right lower back pain due to sciatica    Coronary artery disease     Depression     Dry eyes     Gastroenteritis, acute     GERD (gastroesophageal reflux disease)     History of colon cancer     Hyperaldosteronism     Hyperlipidemia     Hypertension     Hypothyroidism     Keloid cicatrix     Left ventricular diastolic dysfunction with preserved systolic function     Lupus (systemic lupus erythematosus) 8/17/2012    Lupus (systemic lupus erythematosus)     Malnutrition 5/16/2017    Osteoarthritis of cervical spine 8/17/2012    Osteopenia     PAD (peripheral artery disease)     FRAN (renal artery stenosis)        Social History     Socioeconomic History    Marital status: Single     Spouse name: Not on file    Number of children: Not on file    Years of education: Not on file    Highest education level: Not on file   Occupational History     Employer: Retired    Social Needs    Financial resource strain: Not very hard    Food insecurity:     Worry: Never true     Inability: Never true    Transportation needs:     Medical: Yes     Non-medical: Yes   Tobacco Use    Smoking status: Former Smoker     Packs/day: 1.50     Years: 30.00     Pack years: 45.00     Types: Cigarettes     Start date: 1955     Last attempt to quit: 3/13/1985     Years since  "quittin.0    Smokeless tobacco: Never Used   Substance and Sexual Activity    Alcohol use: No    Drug use: No    Sexual activity: Not Currently     Partners: Male     Birth control/protection: Abstinence   Lifestyle    Physical activity:     Days per week: Not on file     Minutes per session: Not on file    Stress: Not on file   Relationships    Social connections:     Talks on phone: Not on file     Gets together: Not on file     Attends Episcopal service: Not on file     Active member of club or organization: Not on file     Attends meetings of clubs or organizations: Not on file     Relationship status: Not on file   Other Topics Concern    Are you pregnant or think you may be? No    Breast-feeding No   Social History Narrative    Not on file       Review of Systems   Constitutional: Negative for appetite change, chills and fever.   HENT: Negative for congestion, rhinorrhea, sinus pain and sore throat.    Eyes: Negative for photophobia, redness and visual disturbance.   Respiratory: Negative for cough and shortness of breath.    Cardiovascular: Positive for leg swelling. Negative for chest pain and palpitations.   Gastrointestinal: Negative for abdominal pain, blood in stool, constipation, diarrhea and vomiting.   Genitourinary: Negative for dysuria, frequency, hematuria and urgency.   Musculoskeletal: Positive for arthralgias and gait problem.   Skin: Positive for color change, rash and wound.   Neurological: Negative for dizziness, syncope, light-headedness and headaches.   Psychiatric/Behavioral: Positive for dysphoric mood and sleep disturbance (feeling excessively sleepy). Negative for confusion and suicidal ideas. The patient is not nervous/anxious.        Objective:   /60   Pulse 64   Ht 5' 4" (1.626 m)   Wt 50.4 kg (111 lb 3.6 oz)   LMP  (LMP Unknown) Comment: 99  SpO2 96%   BMI 19.09 kg/m²     Physical Exam   Constitutional: She is oriented to person, place, and time.   Thin, " yawning a lot during conversation, appears to be low of energy   HENT:   Head: Normocephalic and atraumatic.   Neck: Normal range of motion.   Cardiovascular: Normal rate and regular rhythm.   Murmur (holosystolic) heard.  Pulmonary/Chest: Effort normal and breath sounds normal.   Abdominal: Soft. Bowel sounds are normal. There is no tenderness.   Musculoskeletal: She exhibits deformity. She exhibits no edema.   Red patch on LLE.   Neurological: She is alert and oriented to person, place, and time.   Skin: Skin is warm and dry.   Psychiatric: Her behavior is normal. Thought content normal.   Reduced affect   Vitals reviewed.      Lab Results   Component Value Date    WBC 2.82 (L) 02/12/2020    HGB 10.0 (L) 02/12/2020    HGB 10.0 (L) 02/12/2020    HCT 33.0 (L) 02/12/2020    HCT 33.0 (L) 02/12/2020     02/12/2020    CHOL 181 08/28/2018    TRIG 54 08/28/2018    HDL 66 08/28/2018    ALT 9 (L) 02/12/2020    ALT 9 (L) 02/12/2020    AST 32 02/12/2020    AST 32 02/12/2020     02/12/2020     02/12/2020    K 4.6 02/12/2020    K 4.6 02/12/2020     02/12/2020     02/12/2020    CREATININE 3.4 (H) 02/12/2020    CREATININE 3.4 (H) 02/12/2020    BUN 73 (H) 02/12/2020    BUN 73 (H) 02/12/2020    CO2 24 02/12/2020    CO2 24 02/12/2020    TSH 1.798 01/25/2019    INR 1.0 09/05/2018    GLUF 79 12/02/2011    HGBA1C 5.2 09/25/2018       RESULTS: Reviewed labs and images today    Assessment:   74 y.o. female with multiple co-morbid illnesses here to continue work-up of chronic issues notably with lupus, amyloid and leg rash.     Plan:     Problem List Items Addressed This Visit        Immunology/Multi System    Other forms of systemic lupus erythematosus - Primary (Chronic)    Relevant Orders    CBC auto differential    Comprehensive metabolic panel    Sedimentation rate    Microalbumin/creatinine urine ratio    Urinalysis    ANTI-DNA ANTIBODY, DOUBLE-STRANDED    C-reactive protein    C4 COMPLEMENT    C3  COMPLEMENT    CBC auto differential    Comprehensive metabolic panel    Sedimentation rate    Microalbumin/creatinine urine ratio    Urinalysis    ANTI-DNA ANTIBODY, DOUBLE-STRANDED    C-reactive protein    C4 COMPLEMENT    C3 COMPLEMENT    US Lower Extremity Veins Left       Palliative Care    DNR (do not resuscitate)     Patient does not want aggressive measures at the end of life if there are no meaningful expectations of recovery.  · DNR status updated  · ADV Directives completed and uploaded to chart            Other    Rash due to systemic lupus erythematosus (SLE)     New skin rash on her legs  · Rule out lupus flair versus cellulitis  · Labs today  · Will defer starting antibiotics until we are aware of cause  · Note sent to Rheum         Relevant Orders    CBC auto differential    Comprehensive metabolic panel    Sedimentation rate    Microalbumin/creatinine urine ratio    Urinalysis    ANTI-DNA ANTIBODY, DOUBLE-STRANDED    C-reactive protein    C4 COMPLEMENT    C3 COMPLEMENT    US Lower Extremity Veins Left    Frailty syndrome in geriatric patient     Numerous chronic conditions and end-organ disease, high risk for poor outcomes with virus exposure  · Training in telemedicine  · ADV Dir completed today           Other Visit Diagnoses     Leg swelling        Relevant Orders    US Lower Extremity Veins Left    Edema, unspecified type         Relevant Orders    US Lower Extremity Veins Left          Health Maintenance       Date Due Completion Date    Shingles Vaccine (2 of 3) 01/09/2012 11/14/2011    Mammogram 09/18/2020 9/18/2019    DEXA SCAN 01/08/2022 1/8/2019    Colonoscopy 09/14/2022 9/14/2017    Lipid Panel 08/28/2023 8/28/2018    TETANUS VACCINE 06/29/2026 6/29/2016          Follow up if symptoms worsen or fail to improve. Total face-to-face time was 90 min, >50% of this was spent on counseling and coordination of care. 30min spent on adv directives, patient elects to DNR. The following issues were  discussed: with lupus, amyloid and leg rash    Amarilys Johns MD/MPH  Internal Medicine  [facility]  [phone]

## 2020-03-11 NOTE — ASSESSMENT & PLAN NOTE
Numerous chronic conditions and end-organ disease, high risk for poor outcomes with virus exposure  · Training in telemedicine  · ADV Dir completed today

## 2020-03-11 NOTE — PATIENT INSTRUCTIONS
TODAY:  - labs and urine studies today  - note will be sent to Rheumatology (Dr Ramirez)  - advanced directives today  - US today

## 2020-03-12 ENCOUNTER — TELEPHONE (OUTPATIENT)
Dept: PRIMARY CARE CLINIC | Facility: CLINIC | Age: 75
End: 2020-03-12

## 2020-03-12 ENCOUNTER — CLINICAL SUPPORT (OUTPATIENT)
Dept: PRIMARY CARE CLINIC | Facility: CLINIC | Age: 75
End: 2020-03-12
Payer: MEDICARE

## 2020-03-12 ENCOUNTER — HOSPITAL ENCOUNTER (OUTPATIENT)
Dept: RADIOLOGY | Facility: HOSPITAL | Age: 75
Discharge: HOME | End: 2020-03-12
Attending: INTERNAL MEDICINE
Payer: MEDICARE

## 2020-03-12 DIAGNOSIS — M32.19 OTHER SYSTEMIC LUPUS ERYTHEMATOSUS WITH OTHER ORGAN INVOLVEMENT: Chronic | ICD-10-CM

## 2020-03-12 DIAGNOSIS — M32.14 OTHER SYSTEMIC LUPUS ERYTHEMATOSUS WITH GLOMERULAR DISEASE: ICD-10-CM

## 2020-03-12 DIAGNOSIS — R60.9 EDEMA, UNSPECIFIED TYPE: ICD-10-CM

## 2020-03-12 DIAGNOSIS — M32.9 RASH DUE TO SYSTEMIC LUPUS ERYTHEMATOSUS (SLE): ICD-10-CM

## 2020-03-12 DIAGNOSIS — M32.14 OTHER SYSTEMIC LUPUS ERYTHEMATOSUS WITH GLOMERULAR DISEASE: Primary | ICD-10-CM

## 2020-03-12 DIAGNOSIS — L03.90 CELLULITIS, UNSPECIFIED CELLULITIS SITE: Primary | ICD-10-CM

## 2020-03-12 DIAGNOSIS — M79.89 LEG SWELLING: ICD-10-CM

## 2020-03-12 PROCEDURE — 93971 EXTREMITY STUDY: CPT | Mod: 26,HCNC,LT, | Performed by: RADIOLOGY

## 2020-03-12 PROCEDURE — 93971 US LOWER EXTREMITY VEINS LEFT: ICD-10-PCS | Mod: 26,HCNC,LT, | Performed by: RADIOLOGY

## 2020-03-12 PROCEDURE — 93971 EXTREMITY STUDY: CPT | Mod: TC,HCNC,LT

## 2020-03-12 RX ORDER — AZATHIOPRINE 50 MG/1
50 TABLET ORAL 2 TIMES DAILY
Qty: 60 TABLET | Refills: 2 | Status: SHIPPED | OUTPATIENT
Start: 2020-03-12 | End: 2020-06-10

## 2020-03-12 RX ORDER — CLINDAMYCIN HYDROCHLORIDE 300 MG/1
300 CAPSULE ORAL 3 TIMES DAILY
Qty: 15 CAPSULE | Refills: 0 | Status: SHIPPED | OUTPATIENT
Start: 2020-03-12 | End: 2020-03-16

## 2020-03-12 NOTE — TELEPHONE ENCOUNTER
Please let patient know that her US showed a likely a collection of blood, but it could be an infection.    She should continue the antibiotic and we will reassess on Monday.    Thanks,  KJ

## 2020-03-12 NOTE — TELEPHONE ENCOUNTER
----- Message from Selam Carmichael sent at 3/11/2020  3:21 PM CDT -----  Contact: patient [phone]  Patient asked to speak with Ginny. Patient spoke to  about ordering a pill packet. Please have Ginny call pt.

## 2020-03-12 NOTE — PROGRESS NOTES
HPI: Patient seen for rash monitoring. Per conversation with her Rheumatologist, this is more likely cellulitis than a lupus flair. Her WBC, kidney function, inflammatory markers were stable. Her LE US will be performed today.    VITALS: Her temp 98.4 C        Plan:  - start antibiotics  - complete US to R/O DVT    Amarilys Johns MD/MPH  Internal Medicine  [facility]  [phone] [facility]

## 2020-03-12 NOTE — TELEPHONE ENCOUNTER
----- Message from Luciano Ramirez MD sent at 3/11/2020  6:00 PM CDT -----  It is hard for me to tell from the pictures; her lupus lesions usually have pretty sharp margins and this photo looks more like might be cellulitis or stasis dermatitis/infection   She certainly has ongoing lupus skin lesions ever since she had to stop Plaquenil due to eye toxicity  I am trying to get the capacity to do remote visits in rheum  WD  ----- Message -----  From: Amarilys Johns MD  Sent: 3/11/2020  10:09 AM CDT  To: Luciano Ramirez MD    Hi Dr Ramirez,  Ms Arnold is having a rash site enlargement (see pics in note). I can't determine whether its cellulitis versus lupus flair. It's been here for 2 weeks and is slowly expanding and swelling, so I suspect that its more likely lupus.    I am getting labs today, and am considering US if I can get it done today. Do you need anything else?    I am transitioning her to telemedicine encounters and am training her on it now. Given her frailty and chronic conditions, she is high risk for poor outcomes.    Thanks,  WENDY

## 2020-03-12 NOTE — TELEPHONE ENCOUNTER
Pt could not get US scheduled and needs a ride to imaging center today can I add her for a nurse visit and get her home ?

## 2020-03-13 ENCOUNTER — TELEPHONE (OUTPATIENT)
Dept: OPHTHALMOLOGY | Facility: CLINIC | Age: 75
End: 2020-03-13

## 2020-03-13 ENCOUNTER — TELEPHONE (OUTPATIENT)
Dept: INFECTIOUS DISEASES | Facility: HOSPITAL | Age: 75
End: 2020-03-13

## 2020-03-13 LAB
ALBUMIN/CREAT UR: 2185.1 UG/MG (ref 0–30)
CREAT UR-MCNC: 74 MG/DL (ref 15–325)
DSDNA AB SER-ACNC: NORMAL [IU]/ML
MICROALBUMIN UR DL<=1MG/L-MCNC: 1617 UG/ML

## 2020-03-13 NOTE — TELEPHONE ENCOUNTER
Confirming Appointment for 3/16   Travel: NO  Contact: NO  Symptoms: NO    Pt appointment confirmed, instructed to call clinic and reschedule if she starts with symptoms

## 2020-03-14 NOTE — PROGRESS NOTES
Patient's labs show that she is not having a lupus flair as the cause of her leg swelling. How is her leg doing with the antibiotics?    Thanks,  KJ

## 2020-03-16 ENCOUNTER — TELEPHONE (OUTPATIENT)
Dept: PRIMARY CARE CLINIC | Facility: CLINIC | Age: 75
End: 2020-03-16

## 2020-03-16 ENCOUNTER — NURSE TRIAGE (OUTPATIENT)
Dept: ADMINISTRATIVE | Facility: CLINIC | Age: 75
End: 2020-03-16

## 2020-03-16 ENCOUNTER — TELEPHONE (OUTPATIENT)
Dept: NEPHROLOGY | Facility: CLINIC | Age: 75
End: 2020-03-16

## 2020-03-16 ENCOUNTER — INFUSION (OUTPATIENT)
Dept: INFECTIOUS DISEASES | Facility: HOSPITAL | Age: 75
End: 2020-03-16
Attending: INTERNAL MEDICINE
Payer: MEDICARE

## 2020-03-16 ENCOUNTER — OFFICE VISIT (OUTPATIENT)
Dept: PRIMARY CARE CLINIC | Facility: CLINIC | Age: 75
End: 2020-03-16
Payer: MEDICARE

## 2020-03-16 VITALS
TEMPERATURE: 98 F | HEART RATE: 68 BPM | DIASTOLIC BLOOD PRESSURE: 70 MMHG | SYSTOLIC BLOOD PRESSURE: 182 MMHG | OXYGEN SATURATION: 94 %

## 2020-03-16 DIAGNOSIS — L03.116 CELLULITIS OF LEFT LOWER EXTREMITY: ICD-10-CM

## 2020-03-16 DIAGNOSIS — I10 ESSENTIAL HYPERTENSION: ICD-10-CM

## 2020-03-16 DIAGNOSIS — M32.9 RASH DUE TO SYSTEMIC LUPUS ERYTHEMATOSUS (SLE): ICD-10-CM

## 2020-03-16 PROCEDURE — 1100F PTFALLS ASSESS-DOCD GE2>/YR: CPT | Mod: HCNC,CPTII,S$GLB, | Performed by: INTERNAL MEDICINE

## 2020-03-16 PROCEDURE — 3288F PR FALLS RISK ASSESSMENT DOCUMENTED: ICD-10-PCS | Mod: HCNC,CPTII,S$GLB, | Performed by: INTERNAL MEDICINE

## 2020-03-16 PROCEDURE — 99499 UNLISTED E&M SERVICE: CPT | Mod: S$GLB,,, | Performed by: INTERNAL MEDICINE

## 2020-03-16 PROCEDURE — 99215 OFFICE O/P EST HI 40 MIN: CPT | Mod: HCNC,S$GLB,, | Performed by: INTERNAL MEDICINE

## 2020-03-16 PROCEDURE — 99215 PR OFFICE/OUTPT VISIT, EST, LEVL V, 40-54 MIN: ICD-10-PCS | Mod: HCNC,S$GLB,, | Performed by: INTERNAL MEDICINE

## 2020-03-16 PROCEDURE — 99499 RISK ADDL DX/OHS AUDIT: ICD-10-PCS | Mod: S$GLB,,, | Performed by: INTERNAL MEDICINE

## 2020-03-16 PROCEDURE — 1159F MED LIST DOCD IN RCRD: CPT | Mod: HCNC,S$GLB,, | Performed by: INTERNAL MEDICINE

## 2020-03-16 PROCEDURE — 1100F PR PT FALLS ASSESS DOC 2+ FALLS/FALL W/INJURY/YR: ICD-10-PCS | Mod: HCNC,CPTII,S$GLB, | Performed by: INTERNAL MEDICINE

## 2020-03-16 PROCEDURE — 1159F PR MEDICATION LIST DOCUMENTED IN MEDICAL RECORD: ICD-10-PCS | Mod: HCNC,S$GLB,, | Performed by: INTERNAL MEDICINE

## 2020-03-16 PROCEDURE — 3288F FALL RISK ASSESSMENT DOCD: CPT | Mod: HCNC,CPTII,S$GLB, | Performed by: INTERNAL MEDICINE

## 2020-03-16 NOTE — PROGRESS NOTES
Advised pt to contact us to reschedule injection once blood pressure is under better control.  Pt verbalized understanding. Direct line to infusion suite given.

## 2020-03-16 NOTE — Clinical Note
Dr Ramirez,I am stumped on Ms Clayton leg lesion. She barely improved with a course of clindamycin. She states that the pain is improved with leg elevation, so maybe stasis dermatitis? But there is a mass, which the US did not give a definitive description of. Should I move forward with a CT? I am hesitant to increase the dose of clinda, or switch to another antibiotic because of the many associated risks of abx use.Could assist in any way?Thanks!KJ

## 2020-03-16 NOTE — PATIENT INSTRUCTIONS
TODAY:  - postpone eye exam until 6/2020  - change antibiotic to keflex   + will wait for labs for renal dosing   + will send to [facility]  - continue pill packs  - call this clinic for anything

## 2020-03-16 NOTE — ASSESSMENT & PLAN NOTE
Newly compliant with BP and CHF meds  · BP extremely elevated today!  · Did not take AM meds!  · ADVISED TO TAKE HER MEDS!!  · Continue lasix 40mg BID  · Coreg increased at previous appt to 12.5mg BID   · Continue isosorbide mononitrate 60mg, nifedipine 60mg BID

## 2020-03-16 NOTE — ASSESSMENT & PLAN NOTE
New skin rash on her legs. Diffl: lupus, cellulitis, stasis dermatitis, erythema nodosum. Minimal response to antibiotics  · Rule out lupus flair versus cellulitis  · Labs show no increase in lupus markers  · Advised patient to complete antibiotics (clinda)  · Will defer changing antibiotic class until we are aware of cause  · Note sent to Rheum  · Referral to Derm for telemed

## 2020-03-16 NOTE — ASSESSMENT & PLAN NOTE
Rash with minimal improvement on oral antibiotic, but some reduction in erythema today. Mass is 3cm  · Elevate the leg  · Consider changing antibiotic  · Complete course of clindamycin for now  · Labs today  · PCP communicating with Rheum about expanding the differential  · All lupus markers stable

## 2020-03-16 NOTE — PROGRESS NOTES
Pt arrived to infusion suite for aranesp injection.  Blood pressure elevated at 206/112 upon arrival.  Rechecked 5 minutes later and 231/94.  Pt currently experiencing a nose bleed but denies any other symptoms.  Advised pt to go to ED for further evalultion and treatment.  Pt refused. Stated she was going to see her primary care physician once leaving here.  Pt left via wheel chair, awaiting transportation.

## 2020-03-16 NOTE — TELEPHONE ENCOUNTER
----- Message from Amarilys Johns MD sent at 3/13/2020  7:02 PM CDT -----  Patient's labs show that she is not having a lupus flair as the cause of her leg swelling. How is her leg doing with the antibiotics?    Thanks,  KJ

## 2020-03-16 NOTE — TELEPHONE ENCOUNTER
----- Message from Whitley Mccracken sent at 3/16/2020  8:45 AM CDT -----  Contact: self @ [phone]  Pt is calling to schedule her 1:20 appt this afternoon but there is nothing available.  Pls call with an appt.     Spoke to pt per Dr. Epperson and will reschedule

## 2020-03-16 NOTE — PROGRESS NOTES
PT RETURNED TO INFUSION SUITE AFTER SEEING PRIMARY CARE.  BP STILL ELEVATED ABOVE PROTOCOL.  DR. OCASIO NOTIFIED....ADVISED STAFF TO HOLD ARANESP INJECTION.  PT VERBALIZED UNDERSTANDING.

## 2020-03-16 NOTE — PROGRESS NOTES
"Primary Care Provider Appointment   SHARED NOTE: Nick Sanchez (MS4), Dr Johns (Attending)    Subjective:      Patient ID: Ewelina Arnold is a 74 y.o. female with follow up for mass on leg and shoulder.      Chief Complaint: Mass and Recurrent Skin Infections    Pt had a fall last night while trying to put her slippers on in the bathroom. Pt was leaning over and lost her balance and hit her arm on the side of a basket with a metal bar around the rim. Pt has a 5 cm hematoma noted on her L upper extremity with no sensory loss or bony deformities. Denies hitting her head, presyncope, nausea, vomiting.  She called [facility] and was advised "Advised to Apply ice compresses as per protocol 24 hr then warm after 48."    Pt says that the leg mass has "slightly improved" since starting antibiotics. She states the antibiotics "are not doing me that much good." Area appears to have become less erythematous, but central abscess is more defined and may have gotten larger. Diffuse erythema and tenderness noted on L ankle, with a bulging center that is fluctuant and painful. Pt says that she has been taking clindamycin, three times a day, with three more doses remaining. Pt afebrile today, and WCC was low, with slightly increased immature retics at 0.7%. Reports subjective improvement of rash with leg elevation at night.     Per Rheum [facility] consult:  3/11/2020  6:00 PM CDT -----  It is hard for me to tell from the pictures; her lupus lesions usually have pretty sharp margins and this photo looks more like might be cellulitis or stasis dermatitis/infection   She certainly has ongoing lupus skin lesions ever since she had to stop Plaquenil due to eye toxicity  I am trying to get the capacity to do remote visits in rheum  WD     Per U/S on 3/11:  A hypoechoic structure is identified at the left ankle which may represents a hematoma or joint effusion.  Although it is less likely, abscess can not be ruled out.    ADDENDUM: 1) Patients " returned to clinic after being discharged from iron infusion due to BP being extremely elevated at iron infusion this morning (200s/100s), therefore procedure was aborted. She skipped her morning meds today. Med student witnessed her taking the meds around 12pm. BP rechecked and was 180s/90s. 2) On further evaluation, her rash does not seem infectious, it is unclear the etiology since her lupus markers are stable. PCP to discuss more with Derm and Rheum.    Past Surgical History:   Procedure Laterality Date    CARDIAC CATHETERIZATION  07/27/2011    x1    CHOLECYSTECTOMY  1999    COLON SURGERY  2000 & 2003    partial removal for CA    COLONOSCOPY N/A 4/14/2016    Procedure: COLONOSCOPY;  Surgeon: Jose Steen MD;  [facility]  Service: Endoscopy;  Laterality: N/A;  prep 2 days prior light meals only/clear liquid day before  and 4 dulcolax tabs (5 mgs) at noon    COLONOSCOPY N/A 9/14/2017    Procedure: COLONOSCOPY;  Surgeon: Jose Steen MD;  [facility]  Service: Endoscopy;  Laterality: N/A;    EYE SURGERY      HAND SURGERY Bilateral 1996 & 1997    due to carpal tunnel     HERNIA REPAIR      HYSTERECTOMY  1983    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    OOPHORECTOMY      removed one    Peripheral Iridotomy both eyes  1994    laser angle correction    THYROIDECTOMY, PARTIAL  2006    to remove two nodules and one-half thyroid       Past Medical History:   Diagnosis Date    Acute hypoxemic respiratory failure 4/28/2018    Acute on chronic diastolic congestive heart failure 11/17/2017    Allergy     Anatomical narrow angle glaucoma     Anemia     States diagnosed about a month ago    Aortic atherosclerosis     Arthritis     Atherosclerosis of native coronary artery with angina pectoris 1/3/2020    Cataract     CHF (congestive heart failure)     Chronic kidney disease     Chronic sciatica of left side     Right lower back pain due to sciatica     Coronary artery disease     Depression     Dry eyes     Gastroenteritis, acute     GERD (gastroesophageal reflux disease)     History of colon cancer     Hyperaldosteronism     Hyperlipidemia     Hypertension     Hypothyroidism     Keloid cicatrix     Left ventricular diastolic dysfunction with preserved systolic function     Lupus (systemic lupus erythematosus) 2012    Lupus (systemic lupus erythematosus)     Malnutrition 2017    Osteoarthritis of cervical spine 2012    Osteopenia     PAD (peripheral artery disease)     FRAN (renal artery stenosis)        Social History     Socioeconomic History    Marital status: Single     Spouse name: Not on file    Number of children: Not on file    Years of education: Not on file    Highest education level: Not on file   Occupational History     Employer: Retired    Social Needs    Financial resource strain: Not very hard    Food insecurity:     Worry: Never true     Inability: Never true    Transportation needs:     Medical: Yes     Non-medical: Yes   Tobacco Use    Smoking status: Former Smoker     Packs/day: 1.50     Years: 30.00     Pack years: 45.00     Types: Cigarettes     Start date:      Last attempt to quit: 3/13/1985     Years since quittin.0    Smokeless tobacco: Never Used   Substance and Sexual Activity    Alcohol use: No    Drug use: No    Sexual activity: Not Currently     Partners: Male     Birth control/protection: Abstinence   Lifestyle    Physical activity:     Days per week: Not on file     Minutes per session: Not on file    Stress: Not on file   Relationships    Social connections:     Talks on phone: Not on file     Gets together: Not on file     Attends Adventism service: Not on file     Active member of club or organization: Not on file     Attends meetings of clubs or organizations: Not on file     Relationship status: Not on file   Other Topics Concern    Are you pregnant or think you may be?  No    Breast-feeding No   Social History Narrative    Not on file       Review of Systems   Constitutional: Negative for appetite change, chills and fever.   HENT: Negative for congestion, rhinorrhea, sinus pain and sore throat.    Eyes: Negative for photophobia, redness and visual disturbance.   Respiratory: Negative for cough and shortness of breath.    Cardiovascular: Positive for leg swelling. Negative for chest pain and palpitations.   Gastrointestinal: Negative for abdominal pain, blood in stool, constipation, diarrhea and vomiting.   Genitourinary: Negative for dysuria, frequency, hematuria and urgency.   Musculoskeletal: Positive for arthralgias and gait problem.   Skin: Positive for color change, rash and wound.   Neurological: Negative for dizziness, syncope, light-headedness and headaches.   Psychiatric/Behavioral: Positive for dysphoric mood and sleep disturbance (feeling excessively sleepy). Negative for confusion and suicidal ideas. The patient is not nervous/anxious.        Objective:   LMP  (LMP Unknown) Comment: 99    Physical Exam   Constitutional: She is oriented to person, place, and time.   Thin, yawning a lot during conversation, appears to be low of energy   HENT:   Head: Normocephalic and atraumatic.   Neck: Normal range of motion.   Cardiovascular: Normal rate and regular rhythm.   Murmur (holosystolic) heard.  Pulmonary/Chest: Effort normal and breath sounds normal.   Abdominal: Soft. Bowel sounds are normal. There is no tenderness.   Musculoskeletal: She exhibits deformity. She exhibits no edema.   Red patch with raised center on LLE. Tender.   Neurological: She is alert and oriented to person, place, and time.   Skin: Skin is warm and dry.   Psychiatric: Her behavior is normal. Thought content normal.   Reduced affect   Vitals reviewed.          Lesion may have reduced general swelling and erythema but central location of potential abscess is larger.      3cm central swelling      Lab  Results   Component Value Date    WBC 3.58 (L) 03/16/2020    HGB 9.4 (L) 03/16/2020    HGB 9.4 (L) 03/16/2020    HCT 30.0 (L) 03/16/2020    HCT 30.0 (L) 03/16/2020     03/16/2020    CHOL 181 08/28/2018    TRIG 54 08/28/2018    HDL 66 08/28/2018    ALT 9 (L) 03/16/2020    AST 38 03/16/2020     03/16/2020    K 4.2 03/16/2020     03/16/2020    CREATININE 3.5 (H) 03/16/2020    BUN 97 (H) 03/16/2020    CO2 22 (L) 03/16/2020    TSH 1.798 01/25/2019    INR 1.0 09/05/2018    GLUF 79 12/02/2011    HGBA1C 5.2 09/25/2018       Current Outpatient Medications on File Prior to Visit   Medication Sig Dispense Refill    aspirin (ECOTRIN) 81 MG EC tablet Take 1 tablet (81 mg total) by mouth once daily. 30 tablet 0    atorvastatin (LIPITOR) 80 MG tablet TAKE 1 TABLET EVERY DAY 90 tablet 1    augmented betamethasone dipropionate (DIPROLENE-AF) 0.05 % cream Apply topically 2 (two) times daily. 50 g 3    azaTHIOprine (IMURAN) 50 mg Tab Take 1 tablet (50 mg total) by mouth 2 (two) times daily. 60 tablet 2    betamethasone dipropionate (DIPROLENE) 0.05 % ointment Apply topically 2 (two) times daily to lupus rashes as needed 45 g 3    butalbital-aspirin-caffeine -40 mg (FIORINAL) -40 mg Cap Take 1 capsule by mouth as needed.       calcipotriene 0.005 % sclp soln Apply 1 application topically 2 (two) times daily. (Patient taking differently: Apply 1 application topically 2 (two) times daily as needed. ) 60 mL 3    carvediloL (COREG) 25 MG tablet Take 1 tablet (25 mg total) by mouth 2 (two) times daily with meals. 180 tablet 3    cholestyramine-aspartame (QUESTRAN LIGHT) 4 gram PwPk Take 4 g by mouth as needed.      citalopram (CELEXA) 20 MG tablet TAKE 1 AND 1/2 TABLETS ONE TIME DAILY 135 tablet 0    clobetasol (TEMOVATE) 0.05 % cream Apply topically 2 (two) times daily. To lupus rashes x 2 weeks, then weekends only 60 g 1    clobetasol (TEMOVATE) 0.05 % external solution Apply topically 2 (two)  times daily. (Patient taking differently: Apply topically 2 (two) times daily as needed. ) 50 mL 2    doxazosin (CARDURA) 2 MG tablet Take 1 tablet (2 mg total) by mouth nightly as needed (SBP > 150). 90 tablet 3    ergocalciferol (VITAMIN D2) 50,000 unit Cap Take 1 capsule (50,000 Units total) by mouth every 7 days. 12 capsule 3    estrogens, conjugated, (PREMARIN) 0.45 MG tablet Take 42.5 mg by mouth.      famotidine (PEPCID) 20 MG tablet TAKE 1 TABLET (20 MG TOTAL) BY MOUTH ONCE DAILY. 90 tablet 0    fluocinonide (LIDEX) 0.05 % ointment Apply topically 2 (two) times daily. To lupus rashes PRN 60 g 2    furosemide (LASIX) 40 MG tablet Take 1 tablet (40 mg total) by mouth daily as needed (swelling and shortness of breath). TAKE 1 TABLET DAILY AS NEEDED FOR WEIGHT GAIN, SHORTNESS OF BREATH 90 tablet 3    L.ACIDOPHILUS-BIFIDO.LONGUM ORAL Take by mouth.      latanoprost 0.005 % ophthalmic solution INSTILL 1 DROP INTO BOTH EYES EVERY DAY 3 Bottle 12    levothyroxine (SYNTHROID) 75 MCG tablet TAKE 1 TABLET (75 MCG TOTAL) BY MOUTH BEFORE BREAKFAST 90 tablet 0    meclizine (ANTIVERT) 25 mg tablet Take 1 tablet (25 mg total) by mouth 3 (three) times daily as needed for Dizziness or Nausea. 90 tablet 0    metOLazone (ZAROXOLYN) 2.5 MG tablet Take 2 tablets (5 mg total) by mouth once daily. (Patient taking differently: Take 5 mg by mouth as needed. ) 180 tablet 3    NIFEdipine (PROCARDIA-XL) 60 MG (OSM) 24 hr tablet Take 1 tablet (60 mg total) by mouth 2 (two) times daily. 180 tablet 3    nitroGLYCERIN (NITROSTAT) 0.4 MG SL tablet Place 1 tablet (0.4 mg total) under the tongue every 5 (five) minutes as needed for Chest pain. 25 tablet 3    nystatin (MYCOSTATIN) powder Apply topically as needed.       sodium bicarbonate 650 MG tablet Take 1 tablet (650 mg total) by mouth 2 (two) times daily. 180 tablet 3    sodium bicarbonate 650 MG tablet Take 1 tablet (650 mg total) by mouth 2 (two) times daily. 180 tablet 3     TENS units Sravanthi by Misc.(Non-Drug; Combo Route) route.      triamcinolone acetonide 0.5% (KENALOG) 0.5 % Crea Apply 0.5 application topically 2 (two) times daily.      [DISCONTINUED] clindamycin (CLEOCIN) 300 MG capsule Take 1 capsule (300 mg total) by mouth 3 (three) times daily. 15 capsule 0     No current facility-administered medications on file prior to visit.      Assessment:   74 y.o. female with multiple co-morbid illnesses here to follow-up with PCP and continue work-up of chronic issues notably mass on leg and shoulder.       Plan:     Problem List Items Addressed This Visit        Cardiac/Vascular    Hypertension     Newly compliant with BP and CHF meds  · BP extremely elevated today!  · Did not take AM meds!  · ADVISED TO TAKE HER MEDS!!  · Continue lasix 40mg BID  · Coreg increased at previous appt to 12.5mg BID   · Continue isosorbide mononitrate 60mg, nifedipine 60mg BID            ID    Cellulitis of left lower extremity     Rash with minimal improvement on oral antibiotic, but some reduction in erythema today. Mass is 3cm  · Elevate the leg  · Consider changing antibiotic  · Complete course of clindamycin for now  · Labs today  · PCP communicating with Rheum about expanding the differential  · All lupus markers stable         Relevant Orders    Ambulatory referral/consult to Dermatology       Other    Rash due to systemic lupus erythematosus (SLE)     New skin rash on her legs. Diffl: lupus, cellulitis, stasis dermatitis, erythema nodosum. Minimal response to antibiotics  · Rule out lupus flair versus cellulitis  · Labs show no increase in lupus markers  · Advised patient to complete antibiotics (clinda)  · Will defer changing antibiotic class until we are aware of cause  · Note sent to Rheum  · Referral to Derm for telemed               Health Maintenance       Date Due Completion Date    Shingles Vaccine (2 of 3) 01/09/2012 11/14/2011    Mammogram 09/18/2020 9/18/2019    DEXA SCAN 01/08/2022  1/8/2019    Colonoscopy 09/14/2022 9/14/2017    Lipid Panel 08/28/2023 8/28/2018    TETANUS VACCINE 06/29/2026 6/29/2016          Follow up in about 1 month (around 4/16/2020). Total face-to-face time was 60 min, >50% of this was spent on counseling and coordination of care. The following issues were discussed: mass on leg and shoulder.      Nick Sanchez (MS4)    Amarilys Johns MD/MPH  Internal Medicine  [facility]  [phone] [facility]

## 2020-03-16 NOTE — TELEPHONE ENCOUNTER
Left arm upper and between the elbow and shoulder bruised and raised area 1in. Advised to Apply ice compresses as per protocol 24 hr then warm after 48.    Reason for Disposition   Minor bruise    Additional Information   Negative: Shock suspected (e.g., cold/pale/clammy skin, too weak to stand, low BP, rapid pulse)   Negative: Sounds like a life-threatening emergency to the triager   Negative: Bruises with fever   Negative: Tiny bruises (spots or dots) of unknown cause   Negative: Bruise(s) of forehead or head   Negative: Bruise(s) of face or jaw   Negative: Followed an injury, and triager doesn't know which injury guideline to use first   Negative: Post-operative bruising   Negative: Dizziness or lightheadedness   Negative: [1] Bruise on head/face, chest, or abdomen AND [2]  taking Coumadin (warfarin) or other strong blood thinner, or known bleeding disorder (e.g., thrombocytopenia)   Negative: Unexplained bleeding from another site (e.g., gums, nose, urine) as well   Negative: Patient sounds very sick or weak to the triager   Negative: [1] Not caused by an injury AND [2] 5 or more bruises now   Negative: [1] Raised bruise AND [2] size > 2 inches (5 cm) AND [3] expanding   Negative: [1] SEVERE pain AND [2] not improved 2 hours after pain medicine/ice packs   Negative: Suspicious history for the injury   Negative: Taking Coumadin (warfarin) or other strong blood thinner, or known bleeding disorder (e.g., thrombocytopenia)   Negative: [1] Not caused by an injury AND [2] < 5 unexplained bruises   Negative: [1] After 10 days AND [2] bruise not fading   Negative: [1] After 3 weeks AND [2] bruise still present   Negative: Minor bruising at site of heparin injection  (e.g., Heparin, Lovenox, Innohep)   Negative: Bruising easily is a chronic symptom (recurrent or ongoing AND present > 4 weeks)    Protocols used: BRUISES-A-AH

## 2020-03-17 ENCOUNTER — TELEPHONE (OUTPATIENT)
Dept: PRIMARY CARE CLINIC | Facility: CLINIC | Age: 75
End: 2020-03-17

## 2020-03-18 ENCOUNTER — TELEPHONE (OUTPATIENT)
Dept: PRIMARY CARE CLINIC | Facility: CLINIC | Age: 75
End: 2020-03-18

## 2020-03-18 ENCOUNTER — OFFICE VISIT (OUTPATIENT)
Dept: PRIMARY CARE CLINIC | Facility: CLINIC | Age: 75
End: 2020-03-18
Payer: MEDICARE

## 2020-03-18 ENCOUNTER — OFFICE VISIT (OUTPATIENT)
Dept: RHEUMATOLOGY | Facility: CLINIC | Age: 75
End: 2020-03-18
Payer: MEDICARE

## 2020-03-18 ENCOUNTER — INFUSION (OUTPATIENT)
Dept: INFECTIOUS DISEASES | Facility: HOSPITAL | Age: 75
End: 2020-03-18
Attending: INTERNAL MEDICINE
Payer: MEDICARE

## 2020-03-18 VITALS
HEIGHT: 64 IN | DIASTOLIC BLOOD PRESSURE: 50 MMHG | HEART RATE: 53 BPM | BODY MASS INDEX: 20.32 KG/M2 | WEIGHT: 119.06 LBS | SYSTOLIC BLOOD PRESSURE: 115 MMHG

## 2020-03-18 VITALS
HEART RATE: 53 BPM | WEIGHT: 116.88 LBS | DIASTOLIC BLOOD PRESSURE: 52 MMHG | HEIGHT: 64 IN | SYSTOLIC BLOOD PRESSURE: 106 MMHG | BODY MASS INDEX: 19.96 KG/M2 | TEMPERATURE: 98 F

## 2020-03-18 VITALS
BODY MASS INDEX: 19.87 KG/M2 | WEIGHT: 116.38 LBS | SYSTOLIC BLOOD PRESSURE: 110 MMHG | DIASTOLIC BLOOD PRESSURE: 60 MMHG | HEIGHT: 64 IN | OXYGEN SATURATION: 91 % | HEART RATE: 51 BPM

## 2020-03-18 DIAGNOSIS — R54 FRAILTY SYNDROME IN GERIATRIC PATIENT: ICD-10-CM

## 2020-03-18 DIAGNOSIS — M32.19 OTHER SYSTEMIC LUPUS ERYTHEMATOSUS WITH OTHER ORGAN INVOLVEMENT: Primary | Chronic | ICD-10-CM

## 2020-03-18 DIAGNOSIS — M32.14 OTHER SYSTEMIC LUPUS ERYTHEMATOSUS WITH GLOMERULAR DISEASE: Chronic | ICD-10-CM

## 2020-03-18 DIAGNOSIS — L03.116 CELLULITIS OF LEFT LOWER EXTREMITY: ICD-10-CM

## 2020-03-18 DIAGNOSIS — D63.1 ANEMIA OF CHRONIC RENAL FAILURE, STAGE 4 (SEVERE): ICD-10-CM

## 2020-03-18 DIAGNOSIS — M32.9 RASH DUE TO SYSTEMIC LUPUS ERYTHEMATOSUS (SLE): ICD-10-CM

## 2020-03-18 DIAGNOSIS — S46.312A RUPTURE OF LEFT TRICEPS TENDON, INITIAL ENCOUNTER: ICD-10-CM

## 2020-03-18 DIAGNOSIS — Z79.899 HIGH RISK MEDICATION USE: ICD-10-CM

## 2020-03-18 DIAGNOSIS — N18.4 ANEMIA OF CHRONIC RENAL FAILURE, STAGE 4 (SEVERE): ICD-10-CM

## 2020-03-18 DIAGNOSIS — D63.1 ANEMIA OF CHRONIC RENAL FAILURE, STAGE 4 (SEVERE): Primary | ICD-10-CM

## 2020-03-18 DIAGNOSIS — N18.4 ANEMIA OF CHRONIC RENAL FAILURE, STAGE 4 (SEVERE): Primary | ICD-10-CM

## 2020-03-18 PROCEDURE — 96372 THER/PROPH/DIAG INJ SC/IM: CPT | Mod: HCNC

## 2020-03-18 PROCEDURE — 63600175 PHARM REV CODE 636 W HCPCS: Mod: JG,HCNC | Performed by: INTERNAL MEDICINE

## 2020-03-18 PROCEDURE — 3288F PR FALLS RISK ASSESSMENT DOCUMENTED: ICD-10-PCS | Mod: HCNC,CPTII,S$GLB, | Performed by: INTERNAL MEDICINE

## 2020-03-18 PROCEDURE — 99214 PR OFFICE/OUTPT VISIT, EST, LEVL IV, 30-39 MIN: ICD-10-PCS | Mod: HCNC,S$GLB,, | Performed by: INTERNAL MEDICINE

## 2020-03-18 PROCEDURE — 1100F PR PT FALLS ASSESS DOC 2+ FALLS/FALL W/INJURY/YR: ICD-10-PCS | Mod: HCNC,CPTII,S$GLB, | Performed by: INTERNAL MEDICINE

## 2020-03-18 PROCEDURE — 99215 PR OFFICE/OUTPT VISIT, EST, LEVL V, 40-54 MIN: ICD-10-PCS | Mod: HCNC,S$GLB,, | Performed by: INTERNAL MEDICINE

## 2020-03-18 PROCEDURE — 3288F FALL RISK ASSESSMENT DOCD: CPT | Mod: HCNC,CPTII,S$GLB, | Performed by: INTERNAL MEDICINE

## 2020-03-18 PROCEDURE — 1125F AMNT PAIN NOTED PAIN PRSNT: CPT | Mod: HCNC,S$GLB,, | Performed by: INTERNAL MEDICINE

## 2020-03-18 PROCEDURE — 99215 OFFICE O/P EST HI 40 MIN: CPT | Mod: HCNC,S$GLB,, | Performed by: INTERNAL MEDICINE

## 2020-03-18 PROCEDURE — 99214 OFFICE O/P EST MOD 30 MIN: CPT | Mod: HCNC,S$GLB,, | Performed by: INTERNAL MEDICINE

## 2020-03-18 PROCEDURE — 99999 PR PBB SHADOW E&M-EST. PATIENT-LVL III: ICD-10-PCS | Mod: PBBFAC,HCNC,, | Performed by: INTERNAL MEDICINE

## 2020-03-18 PROCEDURE — 1159F MED LIST DOCD IN RCRD: CPT | Mod: HCNC,S$GLB,, | Performed by: INTERNAL MEDICINE

## 2020-03-18 PROCEDURE — 1125F PR PAIN SEVERITY QUANTIFIED, PAIN PRESENT: ICD-10-PCS | Mod: HCNC,S$GLB,, | Performed by: INTERNAL MEDICINE

## 2020-03-18 PROCEDURE — 99999 PR PBB SHADOW E&M-EST. PATIENT-LVL III: CPT | Mod: PBBFAC,HCNC,, | Performed by: INTERNAL MEDICINE

## 2020-03-18 PROCEDURE — 1100F PTFALLS ASSESS-DOCD GE2>/YR: CPT | Mod: HCNC,CPTII,S$GLB, | Performed by: INTERNAL MEDICINE

## 2020-03-18 PROCEDURE — 1159F PR MEDICATION LIST DOCUMENTED IN MEDICAL RECORD: ICD-10-PCS | Mod: HCNC,S$GLB,, | Performed by: INTERNAL MEDICINE

## 2020-03-18 PROCEDURE — 99499 RISK ADDL DX/OHS AUDIT: ICD-10-PCS | Mod: S$GLB,,, | Performed by: INTERNAL MEDICINE

## 2020-03-18 PROCEDURE — 99499 UNLISTED E&M SERVICE: CPT | Mod: S$GLB,,, | Performed by: INTERNAL MEDICINE

## 2020-03-18 RX ADMIN — DARBEPOETIN ALFA 150 MCG: 150 INJECTION, SOLUTION INTRAVENOUS; SUBCUTANEOUS at 10:03

## 2020-03-18 ASSESSMENT — SYSTEMIC LUPUS ERYTHEMATOSUS DISEASE ACTIVITY INDEX (SLEDAI): TOTAL_SCORE: 4

## 2020-03-18 ASSESSMENT — ROUTINE ASSESSMENT OF PATIENT INDEX DATA (RAPID3)
PATIENT GLOBAL ASSESSMENT SCORE: 8
FATIGUE SCORE: 3.5
MDHAQ FUNCTION SCORE: .8
PAIN SCORE: 1.5
PSYCHOLOGICAL DISTRESS SCORE: 2.2
TOTAL RAPID3 SCORE: 4.05

## 2020-03-18 NOTE — TELEPHONE ENCOUNTER
Please let patient know that her Rheumatologist thinks that the lesion is an abscess. Since it is looking better, we can either continue the clindamycin at a higher dose or I can put a small cut in it and drain it. Which does she want to do?    Thanks,  KJ

## 2020-03-18 NOTE — PROGRESS NOTES
"Subjective:       Patient ID: Ewelina Arnold is a 74 y.o. female.    Chief Complaint: Disease Management       Early 1990's developed acute respiratory failure and spent weeks in ICU [facility]    Positive CARYN, SSB    Hx Rash, leukopenia    Took  hydroxychloroquine >5 yr; stopped after eye exam April 2019 due to abnormal OCT  Since stopping HCQ she has developed new DLE lesions     Hx:  CAD and Aortic stenosis and hx CHF; s/p PCI; ASA , rosuvastatin, carvedilol, furosemide, isosorbide, metolazone ; has appt with Dr. Burdick (unhappy with [facility])  HTN nifedipine  Pulmonary nodule  Thyroid disease on levothyroxine  CKD; she donated a kidney to her brother  Now chronic anemia      After  stopped HCQ due to eye disease she developed cutaneous lupus which eventually decreased with AZA     Seen for LLE skin lesion that has persisted in spite of conservative Rx per Dr Johns    Had recent fall in bathroom and bruised L arm  Has 2 small spots on R shoulder as well  Other skin lesions finally resolved    No c/o re: AZA    Review of Systems   Constitutional: Negative for fatigue, fever and unexpected weight change.   HENT: Negative for mouth sores and trouble swallowing.         Dry mouth   Eyes: Negative for redness.        Dry eyes   Respiratory: Positive for cough and shortness of breath.    Cardiovascular: Negative for chest pain.   Gastrointestinal: Positive for diarrhea. Negative for abdominal pain and constipation.   Skin: Positive for rash.   Neurological: Negative for headaches.   Hematological: Negative for adenopathy. Bruises/bleeds easily.   Psychiatric/Behavioral: Negative for sleep disturbance.         Objective:   BP (!) 115/50   Pulse (!) 53   Ht 5' 4" (1.626 m)   Wt 54 kg (119 lb 0.8 oz)   LMP  (LMP Unknown)   BMI 20.43 kg/m²      Physical Exam   Skin:     2 lesions L shoulder are less than 1 cm and flat   Lesion on LLE as photographed by Dr YODER; it is tender over the nodular lesion in the middle of the " discolored area   Musculoskeletal:   R arm bruise over triceps with palpable muscle proximally         Lab Results   Component Value Date    WBC 3.58 (L) 03/16/2020    HGB 9.4 (L) 03/16/2020    HGB 9.4 (L) 03/16/2020    HCT 30.0 (L) 03/16/2020    HCT 30.0 (L) 03/16/2020    MCV 87 03/16/2020     03/16/2020     Assessment:       1. Other systemic lupus erythematosus with other organ involvement    2. High risk medication use - Both Eyes    3. Rash due to systemic lupus erythematosus (SLE)    4. Cellulitis of left lower extremity    5. Rupture of left triceps tendon, initial encounter            Plan:       Problem List Items Addressed This Visit        Active Problems    Other forms of systemic lupus erythematosus - Primary (Chronic)     SLE is stable with mild leukopenia and anemia but normal other markers and resolution of previous lupus skin lesions    LE lesion looks more like an abscess with surrounding cellulitis that has partially resolved    Continue azathioprine 50 mg bid for lupus  Repeat lupus and AZA monitoring lab in 3m          High risk medication use     WBC sl low but this is also typical of lupus  No liver enzyme elevations  Will cont  mg/d and cont q3m lab monitoring for leukopenia and tranaminase elevations         Rash due to systemic lupus erythematosus (SLE)     Has largely resolved on azathioprine         Cellulitis of left lower extremity     PE shows a tender nodule at center of area of erythema that looks like an abscess  This is different in appearance from her previous lupus lesions that looked more like classic SCLE  Though erythema nodosum would look like this, it is unusual to occur as a single, unilateral lesion  This looks like an abscess which is also c/w ultrasound appearance           Other Visit Diagnoses     Rupture of left triceps tendon, initial encounter

## 2020-03-18 NOTE — ASSESSMENT & PLAN NOTE
WBC sl low but this is also typical of lupus  No liver enzyme elevations  Will cont  mg/d and cont q3m lab monitoring for leukopenia and tranaminase elevations

## 2020-03-18 NOTE — ASSESSMENT & PLAN NOTE
Numerous chronic conditions and end-organ disease, high risk for poor outcomes with virus exposure  · Training in telemedicine  · ADV Dir completed

## 2020-03-18 NOTE — PROGRESS NOTES
Primary Care Provider Appointment    Subjective:      Patient ID: Ewelina Arnold is a 74 y.o. female with SLE, amyloidosis, HTN    Chief Complaint: Follow-up and Leg Pain (left )    Patient's rash has significantly improved today. She completed an antibiotic course (clindamycin) and has been elevating her leg. Today the lesion is smaller, less erythema, less swelling, less tender.    She wants to keep her Rheumatology appointment today.    She missed her iron infusion on Monday due to uncontrolled BP. Today BP is better controlled so she wants this rescheduled.      Past Surgical History:   Procedure Laterality Date    CARDIAC CATHETERIZATION  07/27/2011    x1    CHOLECYSTECTOMY  1999    COLON SURGERY  2000 & 2003    partial removal for CA    COLONOSCOPY N/A 4/14/2016    Procedure: COLONOSCOPY;  Surgeon: Jose Steen MD;  Location: [facility] ([address] [place] [address]);  Service: Endoscopy;  Laterality: N/A;  prep 2 days prior light meals only/clear liquid day before  and 4 dulcolax tabs (5 mgs) at noon    COLONOSCOPY N/A 9/14/2017    Procedure: COLONOSCOPY;  Surgeon: Jose Steen MD;  Location: [facility] ([address] [place] [address]);  Service: Endoscopy;  Laterality: N/A;    EYE SURGERY      HAND SURGERY Bilateral 1996 & 1997    due to carpal tunnel     HERNIA REPAIR      HYSTERECTOMY  1983    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    OOPHORECTOMY      removed one    Peripheral Iridotomy both eyes  1994    laser angle correction    THYROIDECTOMY, PARTIAL  2006    to remove two nodules and one-half thyroid       Past Medical History:   Diagnosis Date    Acute hypoxemic respiratory failure 4/28/2018    Acute on chronic diastolic congestive heart failure 11/17/2017    Allergy     Anatomical narrow angle glaucoma     Anemia     States diagnosed about a month ago    Aortic atherosclerosis     Arthritis     Atherosclerosis of native coronary artery with angina pectoris 1/3/2020    Cataract     CHF (congestive heart failure)      Chronic kidney disease     Chronic sciatica of left side     Right lower back pain due to sciatica    Coronary artery disease     Depression     Dry eyes     Gastroenteritis, acute     GERD (gastroesophageal reflux disease)     History of colon cancer     Hyperaldosteronism     Hyperlipidemia     Hypertension     Hypothyroidism     Keloid cicatrix     Left ventricular diastolic dysfunction with preserved systolic function     Lupus (systemic lupus erythematosus) 2012    Lupus (systemic lupus erythematosus)     Malnutrition 2017    Osteoarthritis of cervical spine 2012    Osteopenia     PAD (peripheral artery disease)     FRAN (renal artery stenosis)        Social History     Socioeconomic History    Marital status: Single     Spouse name: Not on file    Number of children: Not on file    Years of education: Not on file    Highest education level: Not on file   Occupational History     Employer: Retired    Social Needs    Financial resource strain: Not very hard    Food insecurity:     Worry: Never true     Inability: Never true    Transportation needs:     Medical: Yes     Non-medical: Yes   Tobacco Use    Smoking status: Former Smoker     Packs/day: 1.50     Years: 30.00     Pack years: 45.00     Types: Cigarettes     Start date:      Last attempt to quit: 3/13/1985     Years since quittin.0    Smokeless tobacco: Never Used   Substance and Sexual Activity    Alcohol use: No    Drug use: No    Sexual activity: Not Currently     Partners: Male     Birth control/protection: Abstinence   Lifestyle    Physical activity:     Days per week: Not on file     Minutes per session: Not on file    Stress: Not on file   Relationships    Social connections:     Talks on phone: Not on file     Gets together: Not on file     Attends Jew service: Not on file     Active member of club or organization: Not on file     Attends meetings of clubs or organizations: Not on  "file     Relationship status: Not on file   Other Topics Concern    Are you pregnant or think you may be? No    Breast-feeding No   Social History Narrative    Not on file       Review of Systems   Constitutional: Negative for appetite change, chills and fever.   HENT: Negative for congestion, rhinorrhea, sinus pain and sore throat.    Eyes: Negative for photophobia, redness and visual disturbance.   Respiratory: Negative for cough and shortness of breath.    Cardiovascular: Positive for leg swelling. Negative for chest pain and palpitations.   Gastrointestinal: Negative for abdominal pain, blood in stool, constipation, diarrhea and vomiting.   Genitourinary: Negative for dysuria, frequency, hematuria and urgency.   Musculoskeletal: Positive for arthralgias and gait problem.   Skin: Positive for color change, rash and wound.   Neurological: Negative for dizziness, syncope, light-headedness and headaches.   Psychiatric/Behavioral: Positive for dysphoric mood and sleep disturbance (feeling excessively sleepy). Negative for confusion and suicidal ideas. The patient is not nervous/anxious.        Objective:   /60   Pulse (!) 51   Ht 5' 4" (1.626 m)   Wt 52.8 kg (116 lb 6.5 oz)   LMP  (LMP Unknown) Comment: 99  SpO2 (!) 91%   BMI 19.98 kg/m²     Physical Exam   Constitutional: She is oriented to person, place, and time.   Thin, yawning a lot during conversation, appears to be low of energy   HENT:   Head: Normocephalic and atraumatic.   Neck: Normal range of motion.   Cardiovascular: Normal rate and regular rhythm.   Murmur (holosystolic) heard.  Pulmonary/Chest: Effort normal and breath sounds normal.   Abdominal: Soft. Bowel sounds are normal. There is no tenderness.   Musculoskeletal: She exhibits deformity. She exhibits no edema.   Red patch with raised center on LLE. Tender.   Neurological: She is alert and oriented to person, place, and time.   Skin: Skin is warm and dry.   Psychiatric: Her behavior " "is normal. Thought content normal.   Reduced affect   Vitals reviewed.          Lab Results   Component Value Date    WBC 3.58 (L) 03/16/2020    HGB 9.4 (L) 03/16/2020    HGB 9.4 (L) 03/16/2020    HCT 30.0 (L) 03/16/2020    HCT 30.0 (L) 03/16/2020     03/16/2020    CHOL 181 08/28/2018    TRIG 54 08/28/2018    HDL 66 08/28/2018    ALT 9 (L) 03/16/2020    AST 38 03/16/2020     03/16/2020    K 4.2 03/16/2020     03/16/2020    CREATININE 3.5 (H) 03/16/2020    BUN 97 (H) 03/16/2020    CO2 22 (L) 03/16/2020    TSH 1.798 01/25/2019    INR 1.0 09/05/2018    GLUF 79 12/02/2011    HGBA1C 5.2 09/25/2018       RESULTS: Reviewed labs and images today    Assessment:   74 y.o. female with multiple co-morbid illnesses here to continue work-up of chronic issues notably with SLE, amyloidosis, HTN.     Plan:     Problem List Items Addressed This Visit        Immunology/Multi System    Other forms of systemic lupus erythematosus (Chronic)     Diagnosed in 1990s, pos CARYN, SSB. Biopsy showed cutaneous involvment  · Did not tolerate plaquenil  · Completed prednisone for erythema nodosum  · May need another course PRN per Rheum  · Continue Imuran  · F/U Rheum  · PCP previously messaged Dr Ramirez about new rash (pics in note), but reassurance provided as these are "intermittent lesions that she should treat with topical steroids"            Oncology    Anemia of chronic renal failure, stage 4 (severe)     Followed by Nephrology, PRN iron infusions, arenesp injections  · F/U Nephrology (Dr Epperson)  · Iron infusion PRN  · Needs rescheduling today  · Continue arenesp            Other    Frailty syndrome in geriatric patient     Numerous chronic conditions and end-organ disease, high risk for poor outcomes with virus exposure  · Training in telemedicine  · ADV Dir completed               Health Maintenance       Date Due Completion Date    Shingles Vaccine (2 of 3) 01/09/2012 11/14/2011    Mammogram 09/18/2020 9/18/2019    " DEXA SCAN 01/08/2022 1/8/2019    Colonoscopy 09/14/2022 9/14/2017    Lipid Panel 08/28/2023 8/28/2018    TETANUS VACCINE 06/29/2026 6/29/2016          Follow up in about 4 weeks (around 4/15/2020). Total face-to-face time was 60 min, >50% of this was spent on counseling and coordination of care. The following issues were discussed: with SLE, amyloidosis, HTN    Amarilys Johns MD/MPH  Internal Medicine  [facility]  [phone]

## 2020-03-18 NOTE — TELEPHONE ENCOUNTER
Pt does not want the antibiotics she states she will contact us next week for an appt to get it drained will f.u with this

## 2020-03-18 NOTE — ASSESSMENT & PLAN NOTE
Followed by Nephrology, PRN iron infusions, arenesp injections  · F/U Nephrology (Dr Epperson)  · Iron infusion PRN  · Needs rescheduling today  · Continue arenesp

## 2020-03-18 NOTE — ASSESSMENT & PLAN NOTE
PE shows a tender nodule at center of area of erythema that looks like an abscess  This is different in appearance from her previous lupus lesions that looked more like classic SCLE  Though erythema nodosum would look like this, it is unusual to occur as a single, unilateral lesion  This looks like an abscess which is also c/w ultrasound appearance

## 2020-03-18 NOTE — PROGRESS NOTES
Hgb 9.4;Hct 30    Dose increased  PER PROTOCOL FORM DROD-188 from Aranesp 100 mcg to Aranesp 150 mcg.  Aranesp 150 mcg given and 4 week return appt set. Dr Epperson notified.    Gfr 14.1/stage 5

## 2020-03-18 NOTE — ASSESSMENT & PLAN NOTE
SLE is stable with mild leukopenia and anemia but normal other markers and resolution of previous lupus skin lesions    LE lesion looks more like an abscess with surrounding cellulitis that has partially resolved    Continue azathioprine 50 mg bid for lupus  Repeat lupus and AZA monitoring lab in 3m

## 2020-03-19 ENCOUNTER — OFFICE VISIT (OUTPATIENT)
Dept: PRIMARY CARE CLINIC | Facility: CLINIC | Age: 75
End: 2020-03-19
Payer: MEDICARE

## 2020-03-19 VITALS
RESPIRATION RATE: 20 BRPM | SYSTOLIC BLOOD PRESSURE: 166 MMHG | HEIGHT: 64 IN | WEIGHT: 117.06 LBS | TEMPERATURE: 98 F | OXYGEN SATURATION: 92 % | DIASTOLIC BLOOD PRESSURE: 64 MMHG | HEART RATE: 66 BPM | BODY MASS INDEX: 19.99 KG/M2

## 2020-03-19 DIAGNOSIS — L03.116 CELLULITIS OF LEFT LOWER EXTREMITY: ICD-10-CM

## 2020-03-19 DIAGNOSIS — L02.91 ABSCESS: Primary | ICD-10-CM

## 2020-03-19 PROCEDURE — 1101F PT FALLS ASSESS-DOCD LE1/YR: CPT | Mod: HCNC,CPTII,S$GLB, | Performed by: INTERNAL MEDICINE

## 2020-03-19 PROCEDURE — 1159F MED LIST DOCD IN RCRD: CPT | Mod: HCNC,S$GLB,, | Performed by: INTERNAL MEDICINE

## 2020-03-19 PROCEDURE — 1101F PR PT FALLS ASSESS DOC 0-1 FALLS W/OUT INJ PAST YR: ICD-10-PCS | Mod: HCNC,CPTII,S$GLB, | Performed by: INTERNAL MEDICINE

## 2020-03-19 PROCEDURE — 1125F PR PAIN SEVERITY QUANTIFIED, PAIN PRESENT: ICD-10-PCS | Mod: HCNC,S$GLB,, | Performed by: INTERNAL MEDICINE

## 2020-03-19 PROCEDURE — 1125F AMNT PAIN NOTED PAIN PRSNT: CPT | Mod: HCNC,S$GLB,, | Performed by: INTERNAL MEDICINE

## 2020-03-19 PROCEDURE — 99215 OFFICE O/P EST HI 40 MIN: CPT | Mod: HCNC,S$GLB,, | Performed by: INTERNAL MEDICINE

## 2020-03-19 PROCEDURE — 1159F PR MEDICATION LIST DOCUMENTED IN MEDICAL RECORD: ICD-10-PCS | Mod: HCNC,S$GLB,, | Performed by: INTERNAL MEDICINE

## 2020-03-19 PROCEDURE — 99215 PR OFFICE/OUTPT VISIT, EST, LEVL V, 40-54 MIN: ICD-10-PCS | Mod: HCNC,S$GLB,, | Performed by: INTERNAL MEDICINE

## 2020-03-19 RX ORDER — CLINDAMYCIN HYDROCHLORIDE 300 MG/1
300 CAPSULE ORAL 4 TIMES DAILY
Qty: 20 CAPSULE | Refills: 0 | Status: SHIPPED | OUTPATIENT
Start: 2020-03-19

## 2020-03-19 RX ORDER — LIDOCAINE HYDROCHLORIDE 10 MG/ML
1 INJECTION INFILTRATION; PERINEURAL
Status: DISCONTINUED | OUTPATIENT
Start: 2020-03-19 | End: 2020-03-19

## 2020-03-19 RX ORDER — CLINDAMYCIN HYDROCHLORIDE 150 MG/1
150 CAPSULE ORAL EVERY 6 HOURS
Qty: 20 CAPSULE | Refills: 0 | Status: SHIPPED | OUTPATIENT
Start: 2020-03-19

## 2020-03-19 NOTE — PROGRESS NOTES
Primary Care Provider Appointment   SHARED NOTE: Nick Sanchez (MS4), Dr Johns (Attending)    Subjective:      Patient ID: Ewelina Arnold is a 74 y.o. female with L lower leg abscess.      Chief Complaint: Abscess (aabscess to be lanced left lower leg)    Pt here for follow up from Monday 3/17.  Pt denies fever, SOB, constitutional symptoms.  Today, abscess appears smaller, less erythematous, and has smaller dome of abscess, likely measuring 2 cm.    Pt wishes to have abscess drained, but decided to re-bandage wound and encouraged leg elevation due to clinical improvement from previous visit and hx of immunosuppression. Will follow up on Monday 3/23 to reassess progress with five day course of clindamycin 450 mg.      Past Surgical History:   Procedure Laterality Date    CARDIAC CATHETERIZATION  07/27/2011    x1    CHOLECYSTECTOMY  1999    COLON SURGERY  2000 & 2003    partial removal for CA    COLONOSCOPY N/A 4/14/2016    Procedure: COLONOSCOPY;  Surgeon: Jose Steen MD;  Location: [facility] ([address] [place] [address]);  Service: Endoscopy;  Laterality: N/A;  prep 2 days prior light meals only/clear liquid day before  and 4 dulcolax tabs (5 mgs) at noon    COLONOSCOPY N/A 9/14/2017    Procedure: COLONOSCOPY;  Surgeon: Jose Steen MD;  Location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    OOPHORECTOMY      removed one    Peripheral Iridotomy both eyes  1994    laser angle correction    THYROIDECTOMY, PARTIAL  2006    to remove two nodules and one-half thyroid       Past Medical History:   Diagnosis Date    Acute hypoxemic respiratory failure 4/28/2018    Acute on chronic diastolic congestive heart failure 11/17/2017    Allergy     Anatomical narrow angle glaucoma     Anemia     States diagnosed about a month ago    Aortic  atherosclerosis     Arthritis     Atherosclerosis of native coronary artery with angina pectoris 1/3/2020    Cataract     CHF (congestive heart failure)     Chronic kidney disease     Chronic sciatica of left side     Right lower back pain due to sciatica    Coronary artery disease     Depression     Dry eyes     Gastroenteritis, acute     GERD (gastroesophageal reflux disease)     History of colon cancer     Hyperaldosteronism     Hyperlipidemia     Hypertension     Hypothyroidism     Keloid cicatrix     Left ventricular diastolic dysfunction with preserved systolic function     Lupus (systemic lupus erythematosus) 2012    Lupus (systemic lupus erythematosus)     Malnutrition 2017    Osteoarthritis of cervical spine 2012    Osteopenia     PAD (peripheral artery disease)     FRAN (renal artery stenosis)        Social History     Socioeconomic History    Marital status: Single     Spouse name: Not on file    Number of children: Not on file    Years of education: Not on file    Highest education level: Not on file   Occupational History     Employer: Retired    Social Needs    Financial resource strain: Not very hard    Food insecurity:     Worry: Never true     Inability: Never true    Transportation needs:     Medical: Yes     Non-medical: Yes   Tobacco Use    Smoking status: Former Smoker     Packs/day: 1.50     Years: 30.00     Pack years: 45.00     Types: Cigarettes     Start date:      Last attempt to quit: 3/13/1985     Years since quittin.0    Smokeless tobacco: Never Used   Substance and Sexual Activity    Alcohol use: No    Drug use: No    Sexual activity: Not Currently     Partners: Male     Birth control/protection: Abstinence   Lifestyle    Physical activity:     Days per week: Not on file     Minutes per session: Not on file    Stress: Not on file   Relationships    Social connections:     Talks on phone: Not on file     Gets together: Not on  "file     Attends Druze service: Not on file     Active member of club or organization: Not on file     Attends meetings of clubs or organizations: Not on file     Relationship status: Not on file   Other Topics Concern    Are you pregnant or think you may be? No    Breast-feeding No   Social History Narrative    Not on file       Review of Systems   Constitutional: Negative for appetite change, chills and fever.   HENT: Negative for congestion, rhinorrhea, sinus pain and sore throat.    Eyes: Negative for photophobia, redness and visual disturbance.   Respiratory: Negative for cough and shortness of breath.    Cardiovascular: Positive for leg swelling. Negative for chest pain and palpitations.   Gastrointestinal: Negative for abdominal pain, blood in stool, constipation, diarrhea and vomiting.   Genitourinary: Negative for dysuria, frequency, hematuria and urgency.   Musculoskeletal: Positive for arthralgias and gait problem.   Skin: Positive for color change, rash and wound.   Neurological: Negative for dizziness, syncope, light-headedness and headaches.   Psychiatric/Behavioral: Positive for dysphoric mood and sleep disturbance (feeling excessively sleepy). Negative for confusion and suicidal ideas. The patient is not nervous/anxious.        Objective:   BP (!) 166/64 (BP Location: Right arm, Patient Position: Sitting, BP Method: Medium (Manual))   Pulse 66   Temp 98.3 °F (36.8 °C) (Oral)   Resp 20   Ht 5' 4" (1.626 m)   Wt 53.1 kg (117 lb 1 oz)   LMP  (LMP Unknown)   SpO2 (!) 92%   BMI 20.09 kg/m²     Physical Exam   Constitutional: She is oriented to person, place, and time.   Thin, yawning a lot during conversation, appears to be low of energy   HENT:   Head: Normocephalic and atraumatic.   Neck: Normal range of motion.   Cardiovascular: Normal rate and regular rhythm.   Murmur (holosystolic) heard.  Pulmonary/Chest: Effort normal and breath sounds normal.   Abdominal: Soft. Bowel sounds are " normal. There is no tenderness.   Musculoskeletal: She exhibits deformity. She exhibits no edema.   Red patch with raised center on LLE. Tender.   Neurological: She is alert and oriented to person, place, and time.   Skin: Skin is warm and dry.   Psychiatric: Her behavior is normal. Thought content normal.   Reduced affect   Vitals reviewed.          Lab Results   Component Value Date    WBC 3.58 (L) 03/16/2020    HGB 9.4 (L) 03/16/2020    HGB 9.4 (L) 03/16/2020    HCT 30.0 (L) 03/16/2020    HCT 30.0 (L) 03/16/2020     03/16/2020    CHOL 181 08/28/2018    TRIG 54 08/28/2018    HDL 66 08/28/2018    ALT 9 (L) 03/16/2020    AST 38 03/16/2020     03/16/2020    K 4.2 03/16/2020     03/16/2020    CREATININE 3.5 (H) 03/16/2020    BUN 97 (H) 03/16/2020    CO2 22 (L) 03/16/2020    TSH 1.798 01/25/2019    INR 1.0 09/05/2018    GLUF 79 12/02/2011    HGBA1C 5.2 09/25/2018       Current Outpatient Medications on File Prior to Visit   Medication Sig Dispense Refill    aspirin (ECOTRIN) 81 MG EC tablet Take 1 tablet (81 mg total) by mouth once daily. 30 tablet 0    atorvastatin (LIPITOR) 80 MG tablet TAKE 1 TABLET EVERY DAY 90 tablet 1    augmented betamethasone dipropionate (DIPROLENE-AF) 0.05 % cream Apply topically 2 (two) times daily. 50 g 3    azaTHIOprine (IMURAN) 50 mg Tab Take 1 tablet (50 mg total) by mouth 2 (two) times daily. 60 tablet 2    betamethasone dipropionate (DIPROLENE) 0.05 % ointment Apply topically 2 (two) times daily to lupus rashes as needed 45 g 3    butalbital-aspirin-caffeine -40 mg (FIORINAL) -40 mg Cap Take 1 capsule by mouth as needed.       calcipotriene 0.005 % sclp soln Apply 1 application topically 2 (two) times daily. (Patient taking differently: Apply 1 application topically 2 (two) times daily as needed. ) 60 mL 3    carvediloL (COREG) 25 MG tablet Take 1 tablet (25 mg total) by mouth 2 (two) times daily with meals. 180 tablet 3     cholestyramine-aspartame (QUESTRAN LIGHT) 4 gram PwPk Take 4 g by mouth as needed.      citalopram (CELEXA) 20 MG tablet TAKE 1 AND 1/2 TABLETS ONE TIME DAILY 135 tablet 0    clobetasol (TEMOVATE) 0.05 % cream Apply topically 2 (two) times daily. To lupus rashes x 2 weeks, then weekends only 60 g 1    clobetasol (TEMOVATE) 0.05 % external solution Apply topically 2 (two) times daily. (Patient not taking: Reported on 3/18/2020) 50 mL 2    doxazosin (CARDURA) 2 MG tablet Take 1 tablet (2 mg total) by mouth nightly as needed (SBP > 150). 90 tablet 3    ergocalciferol (VITAMIN D2) 50,000 unit Cap Take 1 capsule (50,000 Units total) by mouth every 7 days. 12 capsule 3    estrogens, conjugated, (PREMARIN) 0.45 MG tablet Take 42.5 mg by mouth.      famotidine (PEPCID) 20 MG tablet TAKE 1 TABLET (20 MG TOTAL) BY MOUTH ONCE DAILY. 90 tablet 0    fluocinonide (LIDEX) 0.05 % ointment Apply topically 2 (two) times daily. To lupus rashes PRN 60 g 2    furosemide (LASIX) 40 MG tablet Take 1 tablet (40 mg total) by mouth daily as needed (swelling and shortness of breath). TAKE 1 TABLET DAILY AS NEEDED FOR WEIGHT GAIN, SHORTNESS OF BREATH 90 tablet 3    L.ACIDOPHILUS-BIFIDO.LONGUM ORAL Take by mouth.      latanoprost 0.005 % ophthalmic solution INSTILL 1 DROP INTO BOTH EYES EVERY DAY 3 Bottle 12    levothyroxine (SYNTHROID) 75 MCG tablet TAKE 1 TABLET (75 MCG TOTAL) BY MOUTH BEFORE BREAKFAST 90 tablet 0    meclizine (ANTIVERT) 25 mg tablet Take 1 tablet (25 mg total) by mouth 3 (three) times daily as needed for Dizziness or Nausea. 90 tablet 0    metOLazone (ZAROXOLYN) 2.5 MG tablet Take 2 tablets (5 mg total) by mouth once daily. (Patient taking differently: Take 5 mg by mouth as needed. ) 180 tablet 3    NIFEdipine (PROCARDIA-XL) 60 MG (OSM) 24 hr tablet Take 1 tablet (60 mg total) by mouth 2 (two) times daily. 180 tablet 3    nitroGLYCERIN (NITROSTAT) 0.4 MG SL tablet Place 1 tablet (0.4 mg total) under the  tongue every 5 (five) minutes as needed for Chest pain. 25 tablet 3    nystatin (MYCOSTATIN) powder Apply topically as needed.       sodium bicarbonate 650 MG tablet Take 1 tablet (650 mg total) by mouth 2 (two) times daily. 180 tablet 3    sodium bicarbonate 650 MG tablet Take 1 tablet (650 mg total) by mouth 2 (two) times daily. 180 tablet 3    TENS units Sravanthi by Misc.(Non-Drug; Combo Route) route.      triamcinolone acetonide 0.5% (KENALOG) 0.5 % Crea Apply 0.5 application topically 2 (two) times daily.       No current facility-administered medications on file prior to visit.          Assessment:   74 y.o. female with multiple co-morbid illnesses here to follow-up with PCP and continue work-up of chronic issues notably L lower leg abscess.     Plan:     Problem List Items Addressed This Visit        ID    Abscess - Primary     Patient evaluated by PCP and Rheum, who confirmed that her LL lesion is an abscess, patient wants it I&D'd  · PCP will NOT perform I&D in clinic today  · Patient is immunocompromised  · Wound improving with elevation and short course of antibiotics  · Leg wrapped and clinda increased to 450mg QID  · Close follow-up         Relevant Medications    clindamycin (CLEOCIN) 300 MG capsule    clindamycin (CLEOCIN) 150 MG capsule    Cellulitis of left lower extremity     Rash with minimal improvement on oral antibiotic, but some reduction in erythema today. Mass is 3cm  · Elevate the leg  · Increase antibiotic to clinda 450mg QID for 5 days  · All lupus markers stable               Health Maintenance       Date Due Completion Date    Shingles Vaccine (2 of 3) 01/09/2012 11/14/2011    Mammogram 09/18/2020 9/18/2019    DEXA SCAN 01/08/2022 1/8/2019    Colonoscopy 09/14/2022 9/14/2017    Lipid Panel 08/28/2023 8/28/2018    TETANUS VACCINE 06/29/2026 6/29/2016          Follow up in about 2 days (around 3/21/2020). Total face-to-face time was 60 min, >50% of this was spent on counseling and  coordination of care. The following issues were discussed: L lower leg abscess.    Nick Sanchez (MS4)    Amarilys Johns MD/MPH  Internal Medicine  [facility]  [phone] spectralink

## 2020-03-19 NOTE — ASSESSMENT & PLAN NOTE
Patient evaluated by PCP and Rheum, who confirmed that her LL lesion is an abscess, patient wants it I&D'd  · PCP will NOT perform I&D in clinic today  · Patient is immunocompromised  · Wound improving with elevation and short course of antibiotics  · Leg wrapped and clinda increased to 450mg QID  · Close follow-up

## 2020-03-19 NOTE — PATIENT INSTRUCTIONS
TODAY:  - take 450mg clindamycin 4 times daily  - keep legs elevated  - keep left leg wrapped and dry/clean  - call office if any changes

## 2020-03-23 ENCOUNTER — TELEPHONE (OUTPATIENT)
Dept: PRIMARY CARE CLINIC | Facility: CLINIC | Age: 75
End: 2020-03-23

## 2020-03-23 ENCOUNTER — OFFICE VISIT (OUTPATIENT)
Dept: PRIMARY CARE CLINIC | Facility: CLINIC | Age: 75
End: 2020-03-23
Payer: MEDICARE

## 2020-03-23 DIAGNOSIS — Z20.822 SUSPECTED COVID-19 VIRUS INFECTION: ICD-10-CM

## 2020-03-23 DIAGNOSIS — R54 FRAILTY SYNDROME IN GERIATRIC PATIENT: ICD-10-CM

## 2020-03-23 DIAGNOSIS — Z20.822 CLOSE EXPOSURE TO COVID-19 VIRUS: ICD-10-CM

## 2020-03-23 DIAGNOSIS — I50.32 CHRONIC DIASTOLIC HEART FAILURE: ICD-10-CM

## 2020-03-23 DIAGNOSIS — I13.0 CARDIORENAL SYNDROME, STAGE 1-4 OR UNSPECIFIED CHRONIC KIDNEY DISEASE, WITH HEART FAILURE: ICD-10-CM

## 2020-03-23 DIAGNOSIS — M32.19 OTHER SYSTEMIC LUPUS ERYTHEMATOSUS WITH OTHER ORGAN INVOLVEMENT: Chronic | ICD-10-CM

## 2020-03-23 DIAGNOSIS — E85.82 WILD-TYPE TRANSTHYRETIN-RELATED (ATTR) AMYLOIDOSIS: ICD-10-CM

## 2020-03-23 DIAGNOSIS — Z66 DNR (DO NOT RESUSCITATE): ICD-10-CM

## 2020-03-23 DIAGNOSIS — N18.4 ANEMIA OF CHRONIC RENAL FAILURE, STAGE 4 (SEVERE): ICD-10-CM

## 2020-03-23 DIAGNOSIS — D63.1 ANEMIA OF CHRONIC RENAL FAILURE, STAGE 4 (SEVERE): ICD-10-CM

## 2020-03-23 DIAGNOSIS — L03.116 CELLULITIS OF LEFT LOWER EXTREMITY: ICD-10-CM

## 2020-03-23 PROCEDURE — 99215 OFFICE O/P EST HI 40 MIN: CPT | Mod: HCNC,95,GW, | Performed by: INTERNAL MEDICINE

## 2020-03-23 PROCEDURE — 1101F PT FALLS ASSESS-DOCD LE1/YR: CPT | Mod: HCNC,CPTII,95, | Performed by: INTERNAL MEDICINE

## 2020-03-23 PROCEDURE — 1159F PR MEDICATION LIST DOCUMENTED IN MEDICAL RECORD: ICD-10-PCS | Mod: HCNC,95,, | Performed by: INTERNAL MEDICINE

## 2020-03-23 PROCEDURE — 1101F PR PT FALLS ASSESS DOC 0-1 FALLS W/OUT INJ PAST YR: ICD-10-PCS | Mod: HCNC,CPTII,95, | Performed by: INTERNAL MEDICINE

## 2020-03-23 PROCEDURE — 99215 PR OFFICE/OUTPT VISIT, EST, LEVL V, 40-54 MIN: ICD-10-PCS | Mod: HCNC,95,GW, | Performed by: INTERNAL MEDICINE

## 2020-03-23 PROCEDURE — 1159F MED LIST DOCD IN RCRD: CPT | Mod: HCNC,95,, | Performed by: INTERNAL MEDICINE

## 2020-03-23 NOTE — ASSESSMENT & PLAN NOTE
Numerous chronic conditions and end-organ disease, high risk for poor outcomes with virus exposure  · Training in telemedicine  · ADV Dir completed  · Advised hospice due to known exposure in clinic, and likely exposure in the hospital recently

## 2020-03-23 NOTE — TELEPHONE ENCOUNTER
Please talk patient through the video visit log in. She is active with [facility], she should be able to do this.    WENDY

## 2020-03-23 NOTE — ASSESSMENT & PLAN NOTE
Treated with vindaquel with Cardiology, improvement in functional status  · Continue vindaquel per Cardiology  · Unclear if she can continue this on hospice

## 2020-03-23 NOTE — ASSESSMENT & PLAN NOTE
Followed by Nephrology, PRN iron infusions, arenesp injections  · F/U Nephrology (Dr Epperson)  · Iron infusion PRN  · Next due in 4/2020  · Continue arenesp

## 2020-03-23 NOTE — ASSESSMENT & PLAN NOTE
Rash with minimal improvement on low dose clindamycin. Resolution of rash with higher dose clinda and leg wraps  · Continue leg wraps and elevation  · Completed clinda 450mg QID for 5 days  · All lupus markers stable

## 2020-03-23 NOTE — ASSESSMENT & PLAN NOTE
Patient with worsening kidney function in the setting of decompensated CHF and aortic stenosisF  · Continue frequent diuresis  · F/U Nephrology  · Will likely progression to dialysis if survives other end-stage organ disease  · Continue treatment for amyloid  · Defer to hospice agency for next steps

## 2020-03-23 NOTE — ASSESSMENT & PLAN NOTE
"Diagnosed in 1990s, pos CARYN, SSB. Biopsy showed cutaneous involvment  · Did not tolerate plaquenil  · Completed prednisone for erythema nodosum  · PRN courses per Rheum  · Continue Imuran  · F/U Rheum  · PCP previously messaged Dr Ramirez about new rash (pics in note), but reassurance provided as these are "intermittent lesions that she should treat with topical steroids"  "

## 2020-03-23 NOTE — ASSESSMENT & PLAN NOTE
Grade 2 diastolic failure, systolic HF, aortic stenosis  · lasix 40mg PRN  · TAVR deemed not necessary at this time, will not improve outcomes or symptoms  · Admit to hospice

## 2020-03-23 NOTE — PROGRESS NOTES
[facility] Provider Telemedicine Appointment      The patient location is:  Patient Home   The chief complaint leading to consultation is: leg rash, lupus, CHF, amyloidosis, SOB  Total time spent with patient: 60min    Visit type: Virtual visit with synchronous audio only and video  Each patient to whom he or she provides medical services by telemedicine is:  (1) informed of the relationship between the physician and patient and the respective role of any other health care provider with respect to management of the patient; and (2) notified that he or she may decline to receive medical services by telemedicine and may withdraw from such care at any time.      Subjective:      Patient ID: Ewelnia Arnold is a 75 y.o. female with leg rash, lupus, CHF, amyloidosis    Chief Complaint: Congestive Heart Failure, Shortness of Breath, and Rash    Patient has leg ulcer. She is being treated with clindamycin at this time. The dose was 450mg QID, she has significant reduction in leg swelling. Had a leg wrap in clinic last week, with improvement in swelling.    She has SOB at this time, and documented exposure to COVID. She has been in the clinic and hospital numerous times in the last week. She attributes this to her CHF, but it is not improving with another dose of lasix.     She has end stage disease of all organs (dCHF, CAD, stage 4CKD, amyloidosis) and if she develops COVID will decline rapidly with unlikely improvement with intubation or aggressive care. She is interested in hospice at this time, as she does not want additional hospital admits or resuscitation. She requests that PCP calls her daughter (Carmita Arnold) t discuss. Carmita was in agreement with hospice admission.       Past Surgical History:   Procedure Laterality Date    CARDIAC CATHETERIZATION  07/27/2011    x1    CHOLECYSTECTOMY  1999    COLON SURGERY  2000 & 2003    partial removal for CA    COLONOSCOPY N/A 4/14/2016    Procedure:  COLONOSCOPY;  Surgeon: Jose Steen MD;  Location: [facility] ([address] [place] [address]);  Service: Endoscopy;  Laterality: N/A;  prep 2 days prior light meals only/clear liquid day before  and 4 dulcolax tabs (5 mgs) at noon    COLONOSCOPY N/A 9/14/2017    Procedure: COLONOSCOPY;  Surgeon: Jose Steen MD;  Location: [facility] ([address] [place] [address]);  Service: Endoscopy;  Laterality: N/A;    EYE SURGERY      HAND SURGERY Bilateral 1996 & 1997    due to carpal tunnel     HERNIA REPAIR      HYSTERECTOMY  1983    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    OOPHORECTOMY      removed one    Peripheral Iridotomy both eyes  1994    laser angle correction    THYROIDECTOMY, PARTIAL  2006    to remove two nodules and one-half thyroid       Past Medical History:   Diagnosis Date    Acute hypoxemic respiratory failure 4/28/2018    Acute on chronic diastolic congestive heart failure 11/17/2017    Allergy     Anatomical narrow angle glaucoma     Anemia     States diagnosed about a month ago    Aortic atherosclerosis     Arthritis     Atherosclerosis of native coronary artery with angina pectoris 1/3/2020    Cataract     CHF (congestive heart failure)     Chronic kidney disease     Chronic sciatica of left side     Right lower back pain due to sciatica    Coronary artery disease     Depression     Dry eyes     Gastroenteritis, acute     GERD (gastroesophageal reflux disease)     History of colon cancer     Hyperaldosteronism     Hyperlipidemia     Hypertension     Hypothyroidism     Keloid cicatrix     Left ventricular diastolic dysfunction with preserved systolic function     Lupus (systemic lupus erythematosus) 8/17/2012    Lupus (systemic lupus erythematosus)     Malnutrition 5/16/2017    Osteoarthritis of cervical spine 8/17/2012    Osteopenia     PAD (peripheral artery disease)     FRAN (renal artery stenosis)        Review of Systems   Constitutional: Negative for appetite change, chills and fever.   HENT:  Negative for congestion, rhinorrhea, sinus pain and sore throat.    Eyes: Negative for photophobia, redness and visual disturbance.   Respiratory: Positive for shortness of breath. Negative for cough.    Cardiovascular: Positive for leg swelling. Negative for chest pain and palpitations.   Gastrointestinal: Negative for abdominal pain, blood in stool, constipation, diarrhea and vomiting.   Genitourinary: Negative for dysuria, frequency, hematuria and urgency.   Musculoskeletal: Positive for arthralgias and gait problem.   Skin: Positive for color change, rash and wound.   Neurological: Positive for light-headedness. Negative for dizziness, syncope and headaches.   Psychiatric/Behavioral: Positive for dysphoric mood and sleep disturbance (feeling excessively sleepy). Negative for confusion and suicidal ideas. The patient is not nervous/anxious.        Objective:   LMP  (LMP Unknown)     Physical Exam  INCOMPLETE EXAM DUE TO TELEMEDICINE ENCOUNTER    LEFT LOWER LEG: decrease in swelling, rash has not expanded beyond marked line from Friday 3/20      Lab Results   Component Value Date    WBC 3.58 (L) 03/16/2020    HGB 9.4 (L) 03/16/2020    HGB 9.4 (L) 03/16/2020    HCT 30.0 (L) 03/16/2020    HCT 30.0 (L) 03/16/2020     03/16/2020    CHOL 181 08/28/2018    TRIG 54 08/28/2018    HDL 66 08/28/2018    ALT 9 (L) 03/16/2020    AST 38 03/16/2020     03/16/2020    K 4.2 03/16/2020     03/16/2020    CREATININE 3.5 (H) 03/16/2020    BUN 97 (H) 03/16/2020    CO2 22 (L) 03/16/2020    TSH 1.798 01/25/2019    INR 1.0 09/05/2018    GLUF 79 12/02/2011    HGBA1C 5.2 09/25/2018         Assessment:   75 y.o. female with multiple co-morbid illnesses here to continue work-up of chronic issues notably with leg rash, lupus, CHF, amyloidosis.     Plan:     Problem List Items Addressed This Visit        Cardiac/Vascular    Chronic diastolic heart failure     Grade 2 diastolic failure, systolic HF, aortic stenosis  · lasix  "40mg PRN  · TAVR deemed not necessary at this time, will not improve outcomes or symptoms  · Admit to hospice         Relevant Orders    Ambulatory referral/consult to Hospice    Cardiorenal syndrome, stage 1-4 or unspecified chronic kidney disease, with heart failure     Patient with worsening kidney function in the setting of decompensated CHF and aortic stenosisF  · Continue frequent diuresis  · F/U Nephrology  · Will likely progression to dialysis if survives other end-stage organ disease  · Continue treatment for amyloid  · Defer to hospice agency for next steps         Relevant Orders    Ambulatory referral/consult to Hospice       ID    Cellulitis of left lower extremity     Rash with minimal improvement on low dose clindamycin. Resolution of rash with higher dose clinda and leg wraps  · Continue leg wraps and elevation  · Completed clinda 450mg QID for 5 days  · All lupus markers stable         Relevant Orders    Ambulatory referral/consult to Hospice       Immunology/Multi System    Other forms of systemic lupus erythematosus (Chronic)     Diagnosed in 1990s, pos CARYN, SSB. Biopsy showed cutaneous involvment  · Did not tolerate plaquenil  · Completed prednisone for erythema nodosum  · PRN courses per Rheum  · Continue Imuran  · F/U Rheum  · PCP previously messaged Dr Ramirez about new rash (pics in note), but reassurance provided as these are "intermittent lesions that she should treat with topical steroids"         Relevant Orders    Ambulatory referral/consult to Hospice    Wild-type transthyretin-related (ATTR) amyloidosis     Treated with vindaquel with Cardiology, improvement in functional status  · Continue vindaquel per Cardiology  · Unclear if she can continue this on hospice         Relevant Orders    Ambulatory referral/consult to Hospice       Oncology    Anemia of chronic renal failure, stage 4 (severe)     Followed by Nephrology, PRN iron infusions, arenesp injections  · F/U Nephrology (Dr" Blemur)  · Iron infusion PRN  · Next due in 4/2020  · Continue arenesp         Relevant Orders    Ambulatory referral/consult to Hospice       Palliative Care    DNR (do not resuscitate)     Patient does not want aggressive measures at the end of life if there are no meaningful expectations of recovery.  · DNR status updated  · ADV Directives completed and uploaded to chart         Relevant Orders    Ambulatory referral/consult to Hospice       Other    Frailty syndrome in geriatric patient     Numerous chronic conditions and end-organ disease, high risk for poor outcomes with virus exposure  · Training in telemedicine  · ADV Dir completed  · Advised hospice due to known exposure in clinic, and likely exposure in the hospital recently         Relevant Orders    Ambulatory referral/consult to Hospice    Close exposure to COVID-19 virus     Patient seen in clinic and hospital during peak COVID period near patients with diagnoses of COVID. Patient has little reserve, numerous organs with end stage disease  · Refer to hospice  · Family meeting today         Relevant Orders    Ambulatory referral/consult to Hospice    Suspected COVID-19 virus infection     Patient complains of SOB, has hypoxia and underlying lung disease. Was likely exposed to COVID in clinic. Has numerous co-morbidities (lupus, amyloidosis, CHF, CKD4) that make her high risk for poor outcomes with COVID  · Admit to hospice based on her preferences for end of life care in the home  · Order O2 with hospice for same day delivery               Health Maintenance       Date Due Completion Date    Shingles Vaccine (2 of 3) 01/09/2012 11/14/2011    Mammogram 09/18/2020 9/18/2019    DEXA SCAN 01/08/2022 1/8/2019    Colonoscopy 09/14/2022 9/14/2017    Lipid Panel 08/28/2023 8/28/2018    TETANUS VACCINE 06/29/2026 6/29/2016          Follow up if symptoms worsen or fail to improve. One hour spent with this patient today, more than half of that in counseling on the  following issues: with leg rash, lupus, CHF, amyloidosis    Amarilys Johns MD/MPH  Internal Medicine  [facility]  [phone]

## 2020-03-23 NOTE — ASSESSMENT & PLAN NOTE
Patient seen in clinic and hospital during peak COVID period near patients with diagnoses of COVID. Patient has little reserve, numerous organs with end stage disease  · Refer to hospice  · Family meeting today

## 2020-04-08 ENCOUNTER — TELEPHONE (OUTPATIENT)
Dept: INFECTIOUS DISEASES | Facility: HOSPITAL | Age: 75
End: 2020-04-08

## 2020-06-24 NOTE — TELEPHONE ENCOUNTER
----- Message from Luciano Ramirez MD sent at 3/17/2020  8:53 AM CDT -----  I would have to look at it and palpate it; we'll call to see if she will come in.  WD  ----- Message -----  From: Amarilys Johns MD  Sent: 3/17/2020   8:35 AM CDT  To: Luciano Ramirez MD    Do you think it could be erythema nodosum?  There's no clear reason why she's developing it, but often that's the situation.    Thanks,  KJ  ----- Message -----  From: Luciano Ramirez MD  Sent: 3/16/2020   9:54 PM CDT  To: Amarilys Johns MD    I will see if she will come in to the office to see me   WD  ----- Message -----  From: Amarilys Johns MD  Sent: 3/16/2020   1:21 PM CDT  To: MD Dr James Martinez,  I am stumped on Ms Clayton leg lesion. She barely improved with a course of clindamycin. She states that the pain is improved with leg elevation, so maybe stasis dermatitis? But there is a mass, which the [place] did not give a definitive description of. Should I move forward with a CT?     I am hesitant to increase the dose of clinda, or switch to another antibiotic because of the many associated risks of abx use.    Could assist in any way?    Thanks!  WENDY         Central Prior Authorization Team  Phone: [phone]    PA Initiation    Medication: OMEPRAZOLE 20MG CAP  Insurance Company: [employer] - Phone [phone] Fax [phone]  Pharmacy Filling the Rx: [id number] - [place], [place] - [address] AT [facility]  Filling Pharmacy Phone: [phone]  Filling Pharmacy Fax:    Start Date: 6/24/2020

## 2021-03-03 NOTE — Clinical Note
Please remove patient's lasix from pill packs next month and add weekly Vit D2. She will bring the D2.   Could you give her the extra lasix in a separate bottle? Let me know if you need a script. Thanks, WENDY Previously Declined (within the last year)

## 2021-03-29 ENCOUNTER — TELEPHONE (OUTPATIENT)
Dept: PRIMARY CARE CLINIC | Facility: CLINIC | Age: 76
End: 2021-03-29

## 2021-03-30 ENCOUNTER — TELEPHONE (OUTPATIENT)
Dept: PRIMARY CARE CLINIC | Facility: CLINIC | Age: 76
End: 2021-03-30

## 2021-04-05 PROBLEM — Z20.822 SUSPECTED COVID-19 VIRUS INFECTION: Status: ACTIVE | Noted: 2021-04-05

## 2021-04-05 NOTE — ASSESSMENT & PLAN NOTE
Patient complains of SOB, has hypoxia and underlying lung disease. Was likely exposed to COVID in clinic. Has numerous co-morbidities (lupus, amyloidosis, CHF, CKD4) that make her high risk for poor outcomes with COVID  · Admit to hospice based on her preferences for end of life care in the home  · Order O2 with hospice for same day delivery

## 2022-03-03 NOTE — TELEPHONE ENCOUNTER
Lab Results   Component Value Date    HGBA1C 6 1 08/28/2021       No results for input(s): POCGLU in the last 72 hours  Blood Sugar Average: Last 72 hrs:   currently on Lantus  Insulin sliding scale with Accu-Cheks every 6 hours  Put on hold metformin and insulin glargine  Please call [facility] to see how she's doing.     Is she taking her pill packs, how is her leg swelling and SOB?    Please add her to your list of patients to call, she should be called weekly.    Thanks,  KJ

## 2022-04-12 NOTE — ASSESSMENT & PLAN NOTE
Ms Arnold is a 71yo F with HTN, CKD 3 (solitary kidney), CAD s/p PCI, moderate AS, lupus, colon cancer in remission, who presents with resistant hypertension. Renal ultrasound has raised concerns of renal artery stenosis. Aldosterone/renin ratio elevated at 205; however, renal artery ultrasound shows only mild renal artery stenosis.    She continues to have elevated daytime pressures reaching peak of 230 systolic yesterday and low of 114 systolic this morning after clonidine.     - decrease hydralazine from 150mg TID back to home dose of 50mg BID  - change irbesartan from nightly to qAM  - continue labetalol 400mg BID, torsemide BID, clonidine 0.2mg nightly     Repair Performed By Another Provider Text (Leave Blank If You Do Not Want): After the tissue was excised the defect was repaired by another provider.

## 2023-11-05 NOTE — NURSING
Glucose resulted from AM labs, 53. Pt asymptomatic. Provided with apple juice x 2. Will reassess.    (PROVENTIL;VENTOLIN;PROAIR) 108 (90 BASE) MCG/ACT INHALER    INHALE TWO PUFFS BY MOUTH FOUR TIMES A DAY AS NEEDED FOR WHEEZING    ASPIRIN (ASPIRIN LOW DOSE) 81 MG EC TABLET    Take 1 tablet by mouth daily    ATORVASTATIN (LIPITOR) 20 MG TABLET    Take 1 tablet by mouth nightly    DAPAGLIFLOZIN (FARXIGA) 10 MG TABLET    Take 1 tablet by mouth every morning    KLOR-CON M20 20 MEQ EXTENDED RELEASE TABLET    TAKE 1 TABLET BY MOUTH DAILY    LOSARTAN (COZAAR) 25 MG TABLET    Take 1 tablet by mouth daily    METOPROLOL SUCCINATE (TOPROL XL) 25 MG EXTENDED RELEASE TABLET    Take 1 tablet by mouth in the morning and at bedtime    SPIRONOLACTONE (ALDACTONE) 25 MG TABLET    Take 1 tablet by mouth daily    TORSEMIDE (DEMADEX) 100 MG TABLET    Take 0.5 tablets by mouth daily    TORSEMIDE (DEMADEX) 20 MG TABLET    Take 1 tablet by mouth daily Take one tablet in addition to the 50 mg tablet if weight is above 215 lbs    UMECLIDINIUM-VILANTEROL (ANORO ELLIPTA) 62.5-25 MCG/ACT INHALER    INHALE ONE DOSE BY MOUTH DAILY         ALLERGIES     Patient has no known allergies.     FAMILYHISTORY       Family History   Family history unknown: Yes          SOCIAL HISTORY       Social History     Socioeconomic History    Marital status: Single   Tobacco Use    Smoking status: Former     Packs/day: 1.00     Years: 15.00     Additional pack years: 0.00     Total pack years: 15.00     Types: Cigarettes     Start date: 10/11/1980     Quit date: 2021     Years since quittin.2    Smokeless tobacco: Never   Vaping Use    Vaping Use: Never used   Substance and Sexual Activity    Alcohol use: Not Currently     Comment: \"every once in awhile I have one\"    Drug use: Not Currently     Social Determinants of Health     Financial Resource Strain: Low Risk  (2021)    Overall Financial Resource Strain (CARDIA)     Difficulty of Paying Living Expenses: Not hard at all   Food Insecurity: No Food Insecurity (2021)    Hunger Vital Sign

## 2023-12-08 NOTE — TELEPHONE ENCOUNTER
----- Message from Candace Orlando sent at 6/5/2019  3:27 PM CDT -----  Contact: [phone]  Patient is calling to inform MD of her blood pressure reading for today 6/5 is 172/74.    Please advise, thanks  
Discarded in the usual manner

## 2025-05-07 NOTE — TELEPHONE ENCOUNTER
You have done your best , the pt is due to see me in am   I will address this   How Many Mls Were Removed From The 40 Mg/Ml (1ml) Vial When Preparing The Injectable Solution?: 0 Bill For Wasted Drug (Kenalog)?: no Medical Necessity Clause: This procedure was medically necessary because the lesions that were treated were: Which Kenalog Vial Was Used?: Kenalog 10 mg/ml (5 ml vial) Kenalog Type Of Vial: Multiple Dose Validate Note Data When Using Inventory: Yes Ndc# For Kenalog Only: [id number] Total Volume (Ccs): 0.5 Concentration Of Kenalog Solution Injected (Mg/Ml): 3.0 Show Inventory Tab: Hide Administered By (Optional): Dilan Costa NP Kenalog Preparation: Kenalog Detail Level: Detailed Consent: The risks of atrophy were reviewed with the patient.

## (undated) DEVICE — TRAY BONE MARROW BEK3411

## (undated) DEVICE — NDL SPINAL 20GX3.5 HUB

## (undated) DEVICE — SYR SLIP TIP 10ML SHIELD

## (undated) DEVICE — DRESSING LEUKOPLAST FLEX 1X3IN

## (undated) DEVICE — SEE MEDLINE ITEM 157128